# Patient Record
Sex: MALE | Employment: FULL TIME | URBAN - METROPOLITAN AREA
[De-identification: names, ages, dates, MRNs, and addresses within clinical notes are randomized per-mention and may not be internally consistent; named-entity substitution may affect disease eponyms.]

---

## 2017-03-03 ENCOUNTER — OFFICE VISIT (OUTPATIENT)
Dept: FAMILY MEDICINE CLINIC | Age: 52
End: 2017-03-03

## 2017-03-03 VITALS
WEIGHT: 230 LBS | RESPIRATION RATE: 16 BRPM | TEMPERATURE: 98 F | DIASTOLIC BLOOD PRESSURE: 80 MMHG | OXYGEN SATURATION: 98 % | SYSTOLIC BLOOD PRESSURE: 126 MMHG | HEIGHT: 66 IN | BODY MASS INDEX: 36.96 KG/M2 | HEART RATE: 68 BPM

## 2017-03-03 DIAGNOSIS — Z13.220 LIPID SCREENING: ICD-10-CM

## 2017-03-03 DIAGNOSIS — M79.661 PAIN AND SWELLING OF RIGHT LOWER LEG: ICD-10-CM

## 2017-03-03 DIAGNOSIS — Z00.00 ROUTINE GENERAL MEDICAL EXAMINATION AT A HEALTH CARE FACILITY: Primary | ICD-10-CM

## 2017-03-03 DIAGNOSIS — I89.0 LYMPHEDEMA OF RIGHT LOWER EXTREMITY: ICD-10-CM

## 2017-03-03 DIAGNOSIS — Z79.01 WARFARIN ANTICOAGULATION: ICD-10-CM

## 2017-03-03 DIAGNOSIS — M79.89 PAIN AND SWELLING OF RIGHT LOWER LEG: ICD-10-CM

## 2017-03-03 DIAGNOSIS — R60.0 BILATERAL LEG EDEMA: ICD-10-CM

## 2017-03-03 PROCEDURE — 99386 PREV VISIT NEW AGE 40-64: CPT | Performed by: FAMILY MEDICINE

## 2017-03-03 RX ORDER — FUROSEMIDE 40 MG/1
40 TABLET ORAL DAILY
Qty: 30 TABLET | Refills: 3 | Status: SHIPPED | OUTPATIENT
Start: 2017-03-03 | End: 2017-05-03 | Stop reason: ALTCHOICE

## 2017-03-06 ENCOUNTER — HOSPITAL ENCOUNTER (OUTPATIENT)
Dept: WOUND CARE | Age: 52
Discharge: HOME | End: 2017-03-06
Attending: SPECIALIST | Admitting: SPECIALIST

## 2017-03-06 ENCOUNTER — HOSPITAL ENCOUNTER (OUTPATIENT)
Dept: VASCULAR LAB | Age: 52
Discharge: HOME | End: 2017-03-06
Attending: SPECIALIST | Admitting: SPECIALIST

## 2017-03-06 VITALS
TEMPERATURE: 97.9 F | RESPIRATION RATE: 16 BRPM | SYSTOLIC BLOOD PRESSURE: 141 MMHG | HEART RATE: 78 BPM | DIASTOLIC BLOOD PRESSURE: 87 MMHG

## 2017-03-06 DIAGNOSIS — I89.0 LYMPHEDEMA, NOT ELSEWHERE CLASSIFIED: ICD-10-CM

## 2017-03-06 DIAGNOSIS — L97.919 IDIOPATHIC CHRONIC VENOUS HYPERTENSION OF RIGHT LOWER EXTREMITY WITH ULCER (HCC): ICD-10-CM

## 2017-03-06 DIAGNOSIS — M79.89 PAIN AND SWELLING OF RIGHT LOWER LEG: Primary | ICD-10-CM

## 2017-03-06 DIAGNOSIS — I89.0 OTHER LYMPHEDEMA: ICD-10-CM

## 2017-03-06 DIAGNOSIS — R09.89 DIMINISHED PULSES IN LOWER EXTREMITY: Primary | ICD-10-CM

## 2017-03-06 DIAGNOSIS — I87.311 IDIOPATHIC CHRONIC VENOUS HYPERTENSION OF RIGHT LOWER EXTREMITY WITH ULCER (HCC): ICD-10-CM

## 2017-03-06 DIAGNOSIS — M79.661 PAIN AND SWELLING OF RIGHT LOWER LEG: Primary | ICD-10-CM

## 2017-03-06 DIAGNOSIS — I89.0 CHRONIC ACQUIRED LYMPHEDEMA: ICD-10-CM

## 2017-03-06 LAB
A/G RATIO: 1.3 (ref 1.1–2.2)
ALBUMIN SERPL-MCNC: 4 G/DL (ref 3.4–5)
ALP BLD-CCNC: 67 U/L (ref 40–129)
ALT SERPL-CCNC: 9 U/L (ref 10–40)
ANION GAP SERPL CALCULATED.3IONS-SCNC: 13 MMOL/L (ref 3–16)
AST SERPL-CCNC: 11 U/L (ref 15–37)
BILIRUB SERPL-MCNC: 0.4 MG/DL (ref 0–1)
BUN BLDV-MCNC: 19 MG/DL (ref 7–20)
CALCIUM SERPL-MCNC: 8.8 MG/DL (ref 8.3–10.6)
CHLORIDE BLD-SCNC: 106 MMOL/L (ref 99–110)
CO2: 23 MMOL/L (ref 21–32)
CREAT SERPL-MCNC: 0.9 MG/DL (ref 0.9–1.3)
GFR AFRICAN AMERICAN: >60
GFR NON-AFRICAN AMERICAN: >60
GLOBULIN: 3.1 G/DL
GLUCOSE BLD-MCNC: 88 MG/DL (ref 70–99)
HCT VFR BLD CALC: 34.5 % (ref 40.5–52.5)
HEMOGLOBIN: 11 G/DL (ref 13.5–17.5)
INR BLD: 4.69 (ref 0.85–1.15)
MCH RBC QN AUTO: 25.4 PG (ref 26–34)
MCHC RBC AUTO-ENTMCNC: 31.8 G/DL (ref 31–36)
MCV RBC AUTO: 79.7 FL (ref 80–100)
PDW BLD-RTO: 17.6 % (ref 12.4–15.4)
PLATELET # BLD: 210 K/UL (ref 135–450)
PMV BLD AUTO: 7.2 FL (ref 5–10.5)
POTASSIUM SERPL-SCNC: 4.8 MMOL/L (ref 3.5–5.1)
PROTHROMBIN TIME: 53 SEC (ref 9.6–13)
RBC # BLD: 4.33 M/UL (ref 4.2–5.9)
SODIUM BLD-SCNC: 142 MMOL/L (ref 136–145)
TOTAL PROTEIN: 7.1 G/DL (ref 6.4–8.2)
WBC # BLD: 6.1 K/UL (ref 4–11)

## 2017-03-06 PROCEDURE — 11045 DBRDMT SUBQ TISS EACH ADDL: CPT | Performed by: SPECIALIST

## 2017-03-06 PROCEDURE — 11042 DBRDMT SUBQ TIS 1ST 20SQCM/<: CPT | Performed by: SPECIALIST

## 2017-03-06 PROCEDURE — 99204 OFFICE O/P NEW MOD 45 MIN: CPT | Performed by: SPECIALIST

## 2017-03-06 RX ORDER — LIDOCAINE HYDROCHLORIDE 40 MG/ML
SOLUTION TOPICAL ONCE
Status: COMPLETED | OUTPATIENT
Start: 2017-03-06 | End: 2017-03-06

## 2017-03-06 RX ORDER — CIPROFLOXACIN 500 MG/1
500 TABLET, FILM COATED ORAL 2 TIMES DAILY
Qty: 20 TABLET | Refills: 0 | Status: SHIPPED | OUTPATIENT
Start: 2017-03-06 | End: 2017-03-16

## 2017-03-06 RX ORDER — CIPROFLOXACIN 500 MG/1
500 TABLET, FILM COATED ORAL 2 TIMES DAILY
COMMUNITY
End: 2017-03-13 | Stop reason: SDUPTHER

## 2017-03-06 RX ADMIN — LIDOCAINE HYDROCHLORIDE: 40 SOLUTION TOPICAL at 11:17

## 2017-03-06 ASSESSMENT — PAIN DESCRIPTION - PAIN TYPE: TYPE: ACUTE PAIN;CHRONIC PAIN

## 2017-03-06 ASSESSMENT — PAIN DESCRIPTION - ORIENTATION: ORIENTATION: RIGHT

## 2017-03-06 ASSESSMENT — PAIN SCALES - GENERAL: PAINLEVEL_OUTOF10: 4

## 2017-03-06 ASSESSMENT — PAIN DESCRIPTION - LOCATION: LOCATION: LEG

## 2017-03-07 DIAGNOSIS — I89.0 LYMPHEDEMA OF RIGHT LOWER EXTREMITY: ICD-10-CM

## 2017-03-07 DIAGNOSIS — Z13.220 LIPID SCREENING: ICD-10-CM

## 2017-03-07 DIAGNOSIS — Z79.01 WARFARIN ANTICOAGULATION: ICD-10-CM

## 2017-03-07 LAB
A/G RATIO: 1.2 (ref 1.1–2.2)
ALBUMIN SERPL-MCNC: 3.8 G/DL (ref 3.4–5)
ALP BLD-CCNC: 84 U/L (ref 40–129)
ALT SERPL-CCNC: 9 U/L (ref 10–40)
ANION GAP SERPL CALCULATED.3IONS-SCNC: 12 MMOL/L (ref 3–16)
AST SERPL-CCNC: 9 U/L (ref 15–37)
BILIRUB SERPL-MCNC: 0.4 MG/DL (ref 0–1)
BUN BLDV-MCNC: 19 MG/DL (ref 7–20)
CALCIUM SERPL-MCNC: 8.4 MG/DL (ref 8.3–10.6)
CHLORIDE BLD-SCNC: 109 MMOL/L (ref 99–110)
CHOLESTEROL, TOTAL: 180 MG/DL (ref 0–199)
CO2: 24 MMOL/L (ref 21–32)
CREAT SERPL-MCNC: 0.9 MG/DL (ref 0.9–1.3)
GFR AFRICAN AMERICAN: >60
GFR NON-AFRICAN AMERICAN: >60
GLOBULIN: 3.2 G/DL
GLUCOSE BLD-MCNC: 94 MG/DL (ref 70–99)
GRAM STAIN RESULT: ABNORMAL
HDLC SERPL-MCNC: 39 MG/DL (ref 40–60)
INR BLD: 3.87 (ref 0.85–1.15)
LDL CHOLESTEROL CALCULATED: 117 MG/DL
ORGANISM: ABNORMAL
ORGANISM: ABNORMAL
POTASSIUM SERPL-SCNC: 5.1 MMOL/L (ref 3.5–5.1)
PROTHROMBIN TIME: 43.7 SEC (ref 9.6–13)
SODIUM BLD-SCNC: 145 MMOL/L (ref 136–145)
TOTAL PROTEIN: 7 G/DL (ref 6.4–8.2)
TRIGL SERPL-MCNC: 118 MG/DL (ref 0–150)
VLDLC SERPL CALC-MCNC: 24 MG/DL
WOUND/ABSCESS: ABNORMAL
WOUND/ABSCESS: ABNORMAL

## 2017-03-10 ENCOUNTER — TELEPHONE (OUTPATIENT)
Dept: WOUND CARE | Age: 52
End: 2017-03-10

## 2017-03-10 ENCOUNTER — HOSPITAL ENCOUNTER (OUTPATIENT)
Dept: WOUND CARE | Age: 52
Discharge: HOME | End: 2017-03-10
Attending: SPECIALIST | Admitting: SPECIALIST

## 2017-03-13 ENCOUNTER — TELEPHONE (OUTPATIENT)
Dept: FAMILY MEDICINE CLINIC | Age: 52
End: 2017-03-13

## 2017-03-13 ENCOUNTER — HOSPITAL ENCOUNTER (OUTPATIENT)
Dept: WOUND CARE | Age: 52
Discharge: HOME | End: 2017-03-13
Attending: SPECIALIST | Admitting: SPECIALIST

## 2017-03-13 ENCOUNTER — HOSPITAL ENCOUNTER (OUTPATIENT)
Dept: VASCULAR LAB | Age: 52
Discharge: HOME | End: 2017-03-13
Attending: SPECIALIST | Admitting: SPECIALIST

## 2017-03-13 VITALS
DIASTOLIC BLOOD PRESSURE: 79 MMHG | SYSTOLIC BLOOD PRESSURE: 137 MMHG | RESPIRATION RATE: 16 BRPM | TEMPERATURE: 98.1 F | HEART RATE: 71 BPM

## 2017-03-13 DIAGNOSIS — I87.311 IDIOPATHIC CHRONIC VENOUS HYPERTENSION OF RIGHT LOWER EXTREMITY WITH ULCER (HCC): Primary | ICD-10-CM

## 2017-03-13 DIAGNOSIS — L97.919 IDIOPATHIC CHRONIC VENOUS HYPERTENSION OF RIGHT LOWER EXTREMITY WITH ULCER (HCC): ICD-10-CM

## 2017-03-13 DIAGNOSIS — M79.89 PAIN AND SWELLING OF RIGHT LOWER LEG: Primary | ICD-10-CM

## 2017-03-13 DIAGNOSIS — I89.0 CHRONIC ACQUIRED LYMPHEDEMA: ICD-10-CM

## 2017-03-13 DIAGNOSIS — M79.661 PAIN AND SWELLING OF RIGHT LOWER LEG: Primary | ICD-10-CM

## 2017-03-13 DIAGNOSIS — I87.311 IDIOPATHIC CHRONIC VENOUS HYPERTENSION OF RIGHT LOWER EXTREMITY WITH ULCER (HCC): ICD-10-CM

## 2017-03-13 DIAGNOSIS — I82.501 CHRONIC DEEP VEIN THROMBOSIS (DVT) OF RIGHT LOWER EXTREMITY, UNSPECIFIED VEIN (HCC): ICD-10-CM

## 2017-03-13 DIAGNOSIS — L97.919 IDIOPATHIC CHRONIC VENOUS HYPERTENSION OF RIGHT LOWER EXTREMITY WITH ULCER (HCC): Primary | ICD-10-CM

## 2017-03-13 DIAGNOSIS — I89.0 LYMPHEDEMA, NOT ELSEWHERE CLASSIFIED: ICD-10-CM

## 2017-03-13 DIAGNOSIS — I89.0 CHRONIC ACQUIRED LYMPHEDEMA: Primary | ICD-10-CM

## 2017-03-13 PROCEDURE — 11042 DBRDMT SUBQ TIS 1ST 20SQCM/<: CPT | Performed by: SPECIALIST

## 2017-03-13 PROCEDURE — 11045 DBRDMT SUBQ TISS EACH ADDL: CPT | Performed by: SPECIALIST

## 2017-03-13 RX ORDER — LIDOCAINE HYDROCHLORIDE 40 MG/ML
SOLUTION TOPICAL ONCE
Status: DISCONTINUED | OUTPATIENT
Start: 2017-03-13 | End: 2017-03-14 | Stop reason: HOSPADM

## 2017-03-13 ASSESSMENT — PAIN DESCRIPTION - ORIENTATION: ORIENTATION: LEFT

## 2017-03-13 ASSESSMENT — PAIN SCALES - GENERAL: PAINLEVEL_OUTOF10: 4

## 2017-03-13 ASSESSMENT — PAIN DESCRIPTION - LOCATION: LOCATION: LEG

## 2017-03-13 ASSESSMENT — PAIN DESCRIPTION - PAIN TYPE: TYPE: ACUTE PAIN;CHRONIC PAIN

## 2017-03-14 ENCOUNTER — TELEPHONE (OUTPATIENT)
Dept: WOUND CARE | Age: 52
End: 2017-03-14

## 2017-03-16 LAB
INR BLD: 1.26 (ref 0.85–1.15)
PROTHROMBIN TIME: 14.2 SEC (ref 9.6–13)

## 2017-03-17 ENCOUNTER — OFFICE VISIT (OUTPATIENT)
Dept: FAMILY MEDICINE CLINIC | Age: 52
End: 2017-03-17

## 2017-03-17 ENCOUNTER — HOSPITAL ENCOUNTER (OUTPATIENT)
Dept: WOUND CARE | Age: 52
Discharge: HOME | End: 2017-03-17
Attending: SPECIALIST | Admitting: SPECIALIST

## 2017-03-17 ENCOUNTER — TELEPHONE (OUTPATIENT)
Dept: FAMILY MEDICINE CLINIC | Age: 52
End: 2017-03-17

## 2017-03-17 VITALS
RESPIRATION RATE: 12 BRPM | SYSTOLIC BLOOD PRESSURE: 132 MMHG | HEIGHT: 66 IN | DIASTOLIC BLOOD PRESSURE: 83 MMHG | TEMPERATURE: 97.1 F | WEIGHT: 230 LBS | HEART RATE: 72 BPM | BODY MASS INDEX: 36.96 KG/M2

## 2017-03-17 DIAGNOSIS — B95.8 STAPHYLOCOCCAL INFECTION OF SKIN: ICD-10-CM

## 2017-03-17 DIAGNOSIS — I82.5Z3 LOWER LEG DVT (DEEP VENOUS THROMBOEMBOLISM), CHRONIC, BILATERAL (HCC): Primary | ICD-10-CM

## 2017-03-17 DIAGNOSIS — A49.8 PSEUDOMONAS INFECTION: ICD-10-CM

## 2017-03-17 DIAGNOSIS — L08.9 STAPHYLOCOCCAL INFECTION OF SKIN: ICD-10-CM

## 2017-03-17 DIAGNOSIS — I89.0 LYMPHEDEMA, NOT ELSEWHERE CLASSIFIED: ICD-10-CM

## 2017-03-17 PROBLEM — I82.5Z9 LOWER LEG DVT (DEEP VENOUS THROMBOEMBOLISM), CHRONIC (HCC): Status: ACTIVE | Noted: 2017-03-17

## 2017-03-17 PROCEDURE — 99214 OFFICE O/P EST MOD 30 MIN: CPT | Performed by: FAMILY MEDICINE

## 2017-03-17 RX ORDER — CIPROFLOXACIN 500 MG/1
500 TABLET, FILM COATED ORAL 2 TIMES DAILY
Qty: 28 TABLET | Refills: 0 | Status: SHIPPED | OUTPATIENT
Start: 2017-03-17 | End: 2017-03-20 | Stop reason: ALTCHOICE

## 2017-03-17 RX ORDER — SULFAMETHOXAZOLE AND TRIMETHOPRIM 800; 160 MG/1; MG/1
1 TABLET ORAL 2 TIMES DAILY
Qty: 28 TABLET | Refills: 0 | Status: SHIPPED | OUTPATIENT
Start: 2017-03-17 | End: 2017-03-20

## 2017-03-17 RX ORDER — WARFARIN SODIUM 5 MG/1
TABLET ORAL
Qty: 30 TABLET | Refills: 3 | Status: SHIPPED | OUTPATIENT
Start: 2017-03-17 | End: 2017-08-04 | Stop reason: SDUPTHER

## 2017-03-20 ENCOUNTER — HOSPITAL ENCOUNTER (OUTPATIENT)
Dept: WOUND CARE | Age: 52
Discharge: HOME | End: 2017-03-20
Attending: SPECIALIST | Admitting: SPECIALIST

## 2017-03-20 VITALS
RESPIRATION RATE: 12 BRPM | HEART RATE: 76 BPM | SYSTOLIC BLOOD PRESSURE: 142 MMHG | TEMPERATURE: 98.1 F | DIASTOLIC BLOOD PRESSURE: 81 MMHG

## 2017-03-20 DIAGNOSIS — I82.5Z3 LOWER LEG DVT (DEEP VENOUS THROMBOEMBOLISM), CHRONIC, BILATERAL (HCC): Primary | ICD-10-CM

## 2017-03-20 DIAGNOSIS — I89.0 LYMPHEDEMA, NOT ELSEWHERE CLASSIFIED: ICD-10-CM

## 2017-03-20 DIAGNOSIS — I87.311 IDIOPATHIC CHRONIC VENOUS HYPERTENSION OF RIGHT LOWER EXTREMITY WITH ULCER (HCC): ICD-10-CM

## 2017-03-20 DIAGNOSIS — I89.0 CHRONIC ACQUIRED LYMPHEDEMA: ICD-10-CM

## 2017-03-20 DIAGNOSIS — L97.919 IDIOPATHIC CHRONIC VENOUS HYPERTENSION OF RIGHT LOWER EXTREMITY WITH ULCER (HCC): ICD-10-CM

## 2017-03-20 PROCEDURE — 11045 DBRDMT SUBQ TISS EACH ADDL: CPT | Performed by: SPECIALIST

## 2017-03-20 PROCEDURE — 11042 DBRDMT SUBQ TIS 1ST 20SQCM/<: CPT | Performed by: SPECIALIST

## 2017-03-20 RX ORDER — LIDOCAINE HYDROCHLORIDE 40 MG/ML
SOLUTION TOPICAL ONCE
Status: DISCONTINUED | OUTPATIENT
Start: 2017-03-20 | End: 2017-03-21 | Stop reason: HOSPADM

## 2017-03-20 ASSESSMENT — PAIN DESCRIPTION - LOCATION: LOCATION: LEG

## 2017-03-20 ASSESSMENT — PAIN DESCRIPTION - PAIN TYPE: TYPE: ACUTE PAIN;CHRONIC PAIN

## 2017-03-20 ASSESSMENT — PAIN SCALES - GENERAL: PAINLEVEL_OUTOF10: 5

## 2017-03-20 ASSESSMENT — PAIN DESCRIPTION - ORIENTATION: ORIENTATION: RIGHT

## 2017-03-24 ENCOUNTER — HOSPITAL ENCOUNTER (OUTPATIENT)
Dept: PHYSICAL THERAPY | Age: 52
Discharge: HOME | End: 2017-03-31
Admitting: SPECIALIST

## 2017-03-24 ENCOUNTER — HOSPITAL ENCOUNTER (OUTPATIENT)
Dept: WOUND CARE | Age: 52
Discharge: HOME | End: 2017-03-24
Attending: SPECIALIST | Admitting: SPECIALIST

## 2017-03-24 VITALS
RESPIRATION RATE: 14 BRPM | SYSTOLIC BLOOD PRESSURE: 134 MMHG | HEART RATE: 80 BPM | DIASTOLIC BLOOD PRESSURE: 80 MMHG | TEMPERATURE: 98.1 F

## 2017-03-24 ASSESSMENT — PAIN DESCRIPTION - DESCRIPTORS: DESCRIPTORS: ACHING

## 2017-03-24 ASSESSMENT — PAIN SCALES - GENERAL
PAINLEVEL_OUTOF10: 8
PAINLEVEL_OUTOF10: 8

## 2017-03-24 ASSESSMENT — PAIN DESCRIPTION - ORIENTATION
ORIENTATION: RIGHT
ORIENTATION: RIGHT

## 2017-03-24 ASSESSMENT — PAIN DESCRIPTION - LOCATION
LOCATION: LEG
LOCATION: LEG

## 2017-03-24 ASSESSMENT — PAIN DESCRIPTION - PROGRESSION: CLINICAL_PROGRESSION: NOT CHANGED

## 2017-03-24 ASSESSMENT — PAIN DESCRIPTION - PAIN TYPE
TYPE: CHRONIC PAIN
TYPE: CHRONIC PAIN

## 2017-03-24 ASSESSMENT — PAIN DESCRIPTION - FREQUENCY: FREQUENCY: CONTINUOUS

## 2017-03-27 ENCOUNTER — TELEPHONE (OUTPATIENT)
Dept: WOUND CARE | Age: 52
End: 2017-03-27

## 2017-03-27 ENCOUNTER — HOSPITAL ENCOUNTER (OUTPATIENT)
Dept: WOUND CARE | Age: 52
Discharge: HOME | End: 2017-03-27
Attending: SPECIALIST | Admitting: SPECIALIST

## 2017-03-27 ENCOUNTER — OFFICE VISIT (OUTPATIENT)
Dept: FAMILY MEDICINE CLINIC | Age: 52
End: 2017-03-27

## 2017-03-27 VITALS
RESPIRATION RATE: 20 BRPM | OXYGEN SATURATION: 96 % | DIASTOLIC BLOOD PRESSURE: 75 MMHG | SYSTOLIC BLOOD PRESSURE: 121 MMHG | TEMPERATURE: 97.9 F | BODY MASS INDEX: 37.45 KG/M2 | WEIGHT: 232 LBS | HEART RATE: 80 BPM

## 2017-03-27 VITALS
TEMPERATURE: 97.8 F | DIASTOLIC BLOOD PRESSURE: 80 MMHG | SYSTOLIC BLOOD PRESSURE: 159 MMHG | HEART RATE: 80 BPM | RESPIRATION RATE: 16 BRPM

## 2017-03-27 DIAGNOSIS — L97.919 IDIOPATHIC CHRONIC VENOUS HYPERTENSION OF RIGHT LOWER EXTREMITY WITH ULCER (HCC): ICD-10-CM

## 2017-03-27 DIAGNOSIS — I82.5Z3 LOWER LEG DVT (DEEP VENOUS THROMBOEMBOLISM), CHRONIC, BILATERAL (HCC): Primary | ICD-10-CM

## 2017-03-27 DIAGNOSIS — I82.5Z3 LOWER LEG DVT (DEEP VENOUS THROMBOEMBOLISM), CHRONIC, BILATERAL (HCC): ICD-10-CM

## 2017-03-27 DIAGNOSIS — I87.311 IDIOPATHIC CHRONIC VENOUS HYPERTENSION OF RIGHT LOWER EXTREMITY WITH ULCER (HCC): ICD-10-CM

## 2017-03-27 DIAGNOSIS — I89.0 CHRONIC ACQUIRED LYMPHEDEMA: Primary | ICD-10-CM

## 2017-03-27 LAB
INR BLD: 2.24 (ref 0.85–1.15)
PROTHROMBIN TIME: 25.3 SEC (ref 9.6–13)

## 2017-03-27 PROCEDURE — 11045 DBRDMT SUBQ TISS EACH ADDL: CPT | Performed by: SPECIALIST

## 2017-03-27 PROCEDURE — 11042 DBRDMT SUBQ TIS 1ST 20SQCM/<: CPT | Performed by: SPECIALIST

## 2017-03-27 PROCEDURE — 99213 OFFICE O/P EST LOW 20 MIN: CPT | Performed by: FAMILY MEDICINE

## 2017-03-27 RX ORDER — LIDOCAINE HYDROCHLORIDE 40 MG/ML
SOLUTION TOPICAL ONCE
Status: COMPLETED | OUTPATIENT
Start: 2017-03-27 | End: 2017-03-27

## 2017-03-27 RX ADMIN — LIDOCAINE HYDROCHLORIDE: 40 SOLUTION TOPICAL at 09:17

## 2017-03-31 ENCOUNTER — HOSPITAL ENCOUNTER (OUTPATIENT)
Dept: WOUND CARE | Age: 52
Discharge: HOME | End: 2017-03-31
Attending: SPECIALIST | Admitting: SPECIALIST

## 2017-03-31 VITALS
HEART RATE: 68 BPM | RESPIRATION RATE: 18 BRPM | TEMPERATURE: 98.3 F | SYSTOLIC BLOOD PRESSURE: 134 MMHG | DIASTOLIC BLOOD PRESSURE: 74 MMHG

## 2017-03-31 ASSESSMENT — PAIN DESCRIPTION - DESCRIPTORS: DESCRIPTORS: BURNING

## 2017-03-31 ASSESSMENT — PAIN DESCRIPTION - PAIN TYPE: TYPE: CHRONIC PAIN

## 2017-03-31 ASSESSMENT — PAIN DESCRIPTION - LOCATION: LOCATION: LEG

## 2017-03-31 ASSESSMENT — PAIN SCALES - GENERAL: PAINLEVEL_OUTOF10: 8

## 2017-03-31 ASSESSMENT — PAIN DESCRIPTION - ORIENTATION: ORIENTATION: RIGHT

## 2017-04-03 ENCOUNTER — HOSPITAL ENCOUNTER (OUTPATIENT)
Dept: PHYSICAL THERAPY | Age: 52
Discharge: HOME | End: 2017-04-30
Attending: SPECIALIST | Admitting: SPECIALIST

## 2017-04-03 ENCOUNTER — HOSPITAL ENCOUNTER (OUTPATIENT)
Dept: WOUND CARE | Age: 52
Discharge: HOME | End: 2017-04-03
Attending: SPECIALIST | Admitting: SPECIALIST

## 2017-04-03 VITALS
TEMPERATURE: 98.7 F | RESPIRATION RATE: 16 BRPM | DIASTOLIC BLOOD PRESSURE: 78 MMHG | HEART RATE: 76 BPM | SYSTOLIC BLOOD PRESSURE: 119 MMHG

## 2017-04-03 DIAGNOSIS — L97.919 IDIOPATHIC CHRONIC VENOUS HYPERTENSION OF RIGHT LOWER EXTREMITY WITH ULCER (HCC): ICD-10-CM

## 2017-04-03 DIAGNOSIS — I89.0 CHRONIC ACQUIRED LYMPHEDEMA: Primary | ICD-10-CM

## 2017-04-03 DIAGNOSIS — I87.311 IDIOPATHIC CHRONIC VENOUS HYPERTENSION OF RIGHT LOWER EXTREMITY WITH ULCER (HCC): ICD-10-CM

## 2017-04-03 PROCEDURE — 11045 DBRDMT SUBQ TISS EACH ADDL: CPT | Performed by: SPECIALIST

## 2017-04-03 PROCEDURE — 11042 DBRDMT SUBQ TIS 1ST 20SQCM/<: CPT | Performed by: SPECIALIST

## 2017-04-03 RX ORDER — LIDOCAINE HYDROCHLORIDE 40 MG/ML
SOLUTION TOPICAL ONCE
Status: COMPLETED | OUTPATIENT
Start: 2017-04-03 | End: 2017-04-03

## 2017-04-03 RX ADMIN — LIDOCAINE HYDROCHLORIDE: 40 SOLUTION TOPICAL at 09:38

## 2017-04-03 ASSESSMENT — PAIN DESCRIPTION - LOCATION: LOCATION: LEG

## 2017-04-03 ASSESSMENT — PAIN DESCRIPTION - PAIN TYPE: TYPE: CHRONIC PAIN

## 2017-04-03 ASSESSMENT — PAIN DESCRIPTION - DESCRIPTORS: DESCRIPTORS: BURNING

## 2017-04-03 ASSESSMENT — PAIN DESCRIPTION - ORIENTATION: ORIENTATION: RIGHT

## 2017-04-03 ASSESSMENT — PAIN SCALES - GENERAL: PAINLEVEL_OUTOF10: 8

## 2017-04-07 ENCOUNTER — HOSPITAL ENCOUNTER (OUTPATIENT)
Dept: WOUND CARE | Age: 52
Discharge: HOME | End: 2017-04-07
Attending: SPECIALIST | Admitting: SPECIALIST

## 2017-04-07 VITALS
DIASTOLIC BLOOD PRESSURE: 88 MMHG | SYSTOLIC BLOOD PRESSURE: 147 MMHG | HEART RATE: 84 BPM | TEMPERATURE: 98 F | RESPIRATION RATE: 18 BRPM

## 2017-04-07 LAB
GRAM STAIN RESULT: ABNORMAL
ORGANISM: ABNORMAL
ORGANISM: ABNORMAL
WOUND/ABSCESS: ABNORMAL

## 2017-04-10 ENCOUNTER — HOSPITAL ENCOUNTER (OUTPATIENT)
Dept: WOUND CARE | Age: 52
Discharge: HOME | End: 2017-04-10
Attending: SPECIALIST | Admitting: SPECIALIST

## 2017-04-10 ENCOUNTER — HOSPITAL ENCOUNTER (OUTPATIENT)
Dept: PHYSICAL THERAPY | Age: 52
Discharge: HOME | End: 2017-04-10
Admitting: SPECIALIST

## 2017-04-10 VITALS
DIASTOLIC BLOOD PRESSURE: 75 MMHG | SYSTOLIC BLOOD PRESSURE: 129 MMHG | TEMPERATURE: 98.7 F | HEART RATE: 72 BPM | RESPIRATION RATE: 18 BRPM

## 2017-04-10 DIAGNOSIS — I89.0 CHRONIC ACQUIRED LYMPHEDEMA: Primary | ICD-10-CM

## 2017-04-10 DIAGNOSIS — L97.919 IDIOPATHIC CHRONIC VENOUS HYPERTENSION OF RIGHT LOWER EXTREMITY WITH ULCER (HCC): ICD-10-CM

## 2017-04-10 DIAGNOSIS — I87.311 IDIOPATHIC CHRONIC VENOUS HYPERTENSION OF RIGHT LOWER EXTREMITY WITH ULCER (HCC): ICD-10-CM

## 2017-04-10 PROCEDURE — 11045 DBRDMT SUBQ TISS EACH ADDL: CPT | Performed by: SPECIALIST

## 2017-04-10 PROCEDURE — 11042 DBRDMT SUBQ TIS 1ST 20SQCM/<: CPT | Performed by: SPECIALIST

## 2017-04-10 RX ORDER — LIDOCAINE HYDROCHLORIDE 40 MG/ML
SOLUTION TOPICAL ONCE
Status: DISCONTINUED | OUTPATIENT
Start: 2017-04-10 | End: 2017-04-11 | Stop reason: HOSPADM

## 2017-04-13 ENCOUNTER — HOSPITAL ENCOUNTER (OUTPATIENT)
Dept: WOUND CARE | Age: 52
Discharge: HOME | End: 2017-04-13
Attending: SPECIALIST | Admitting: SPECIALIST

## 2017-04-13 VITALS
TEMPERATURE: 98 F | SYSTOLIC BLOOD PRESSURE: 142 MMHG | RESPIRATION RATE: 16 BRPM | HEART RATE: 69 BPM | DIASTOLIC BLOOD PRESSURE: 87 MMHG

## 2017-04-17 ENCOUNTER — HOSPITAL ENCOUNTER (OUTPATIENT)
Dept: WOUND CARE | Age: 52
Discharge: HOME | End: 2017-04-17
Attending: SPECIALIST | Admitting: SPECIALIST

## 2017-04-17 ENCOUNTER — HOSPITAL ENCOUNTER (OUTPATIENT)
Dept: PHYSICAL THERAPY | Age: 52
Discharge: HOME | End: 2017-04-17
Admitting: SPECIALIST

## 2017-04-17 VITALS
SYSTOLIC BLOOD PRESSURE: 147 MMHG | RESPIRATION RATE: 14 BRPM | HEART RATE: 72 BPM | TEMPERATURE: 98.1 F | DIASTOLIC BLOOD PRESSURE: 74 MMHG

## 2017-04-17 DIAGNOSIS — I89.0 CHRONIC ACQUIRED LYMPHEDEMA: Primary | ICD-10-CM

## 2017-04-17 DIAGNOSIS — I87.311 IDIOPATHIC CHRONIC VENOUS HYPERTENSION OF RIGHT LOWER EXTREMITY WITH ULCER (HCC): ICD-10-CM

## 2017-04-17 DIAGNOSIS — L97.919 IDIOPATHIC CHRONIC VENOUS HYPERTENSION OF RIGHT LOWER EXTREMITY WITH ULCER (HCC): ICD-10-CM

## 2017-04-17 PROCEDURE — 11042 DBRDMT SUBQ TIS 1ST 20SQCM/<: CPT | Performed by: SPECIALIST

## 2017-04-17 PROCEDURE — 11045 DBRDMT SUBQ TISS EACH ADDL: CPT | Performed by: SPECIALIST

## 2017-04-17 RX ORDER — LIDOCAINE HYDROCHLORIDE 40 MG/ML
SOLUTION TOPICAL ONCE
Status: COMPLETED | OUTPATIENT
Start: 2017-04-17 | End: 2017-04-17

## 2017-04-17 RX ADMIN — LIDOCAINE HYDROCHLORIDE: 40 SOLUTION TOPICAL at 10:27

## 2017-04-21 ENCOUNTER — HOSPITAL ENCOUNTER (OUTPATIENT)
Dept: WOUND CARE | Age: 52
Discharge: HOME | End: 2017-04-21
Attending: SPECIALIST | Admitting: SPECIALIST

## 2017-04-24 ENCOUNTER — HOSPITAL ENCOUNTER (OUTPATIENT)
Dept: WOUND CARE | Age: 52
Discharge: HOME | End: 2017-04-24
Attending: SPECIALIST | Admitting: SPECIALIST

## 2017-04-24 ENCOUNTER — HOSPITAL ENCOUNTER (OUTPATIENT)
Dept: PHYSICAL THERAPY | Age: 52
Discharge: HOME | End: 2017-04-24
Admitting: SPECIALIST

## 2017-04-24 VITALS
DIASTOLIC BLOOD PRESSURE: 84 MMHG | HEART RATE: 71 BPM | TEMPERATURE: 97.9 F | RESPIRATION RATE: 16 BRPM | SYSTOLIC BLOOD PRESSURE: 130 MMHG

## 2017-04-24 DIAGNOSIS — L97.919 IDIOPATHIC CHRONIC VENOUS HYPERTENSION OF RIGHT LOWER EXTREMITY WITH ULCER (HCC): ICD-10-CM

## 2017-04-24 DIAGNOSIS — I89.0 CHRONIC ACQUIRED LYMPHEDEMA: Primary | ICD-10-CM

## 2017-04-24 DIAGNOSIS — I87.311 IDIOPATHIC CHRONIC VENOUS HYPERTENSION OF RIGHT LOWER EXTREMITY WITH ULCER (HCC): ICD-10-CM

## 2017-04-24 DIAGNOSIS — I82.5Z3 LOWER LEG DVT (DEEP VENOUS THROMBOEMBOLISM), CHRONIC, BILATERAL (HCC): ICD-10-CM

## 2017-04-24 DIAGNOSIS — I82.5Z1 LOWER LEG DVT (DEEP VENOUS THROMBOEMBOLISM), CHRONIC, RIGHT (HCC): ICD-10-CM

## 2017-04-24 LAB
INR BLD: 2.27 (ref 0.85–1.15)
PROTHROMBIN TIME: 25.7 SEC (ref 9.6–13)

## 2017-04-24 PROCEDURE — 11042 DBRDMT SUBQ TIS 1ST 20SQCM/<: CPT | Performed by: SPECIALIST

## 2017-04-24 PROCEDURE — 11045 DBRDMT SUBQ TISS EACH ADDL: CPT | Performed by: SPECIALIST

## 2017-04-24 RX ORDER — SULFAMETHOXAZOLE AND TRIMETHOPRIM 800; 160 MG/1; MG/1
1 TABLET ORAL 2 TIMES DAILY
COMMUNITY
End: 2017-04-28

## 2017-04-24 RX ORDER — OMEGA-3 FATTY ACIDS/FISH OIL 300-1000MG
1 CAPSULE ORAL EVERY 4 HOURS PRN
COMMUNITY
End: 2018-04-16

## 2017-04-24 RX ORDER — SULFAMETHOXAZOLE AND TRIMETHOPRIM 800; 160 MG/1; MG/1
1 TABLET ORAL 2 TIMES DAILY
Qty: 20 TABLET | Refills: 0 | Status: SHIPPED | OUTPATIENT
Start: 2017-04-24 | End: 2017-05-04

## 2017-04-24 RX ORDER — LIDOCAINE HYDROCHLORIDE 40 MG/ML
SOLUTION TOPICAL ONCE
Status: COMPLETED | OUTPATIENT
Start: 2017-04-24 | End: 2017-04-24

## 2017-04-24 RX ADMIN — LIDOCAINE HYDROCHLORIDE: 40 SOLUTION TOPICAL at 08:23

## 2017-04-24 ASSESSMENT — PAIN DESCRIPTION - ORIENTATION: ORIENTATION: RIGHT

## 2017-04-24 ASSESSMENT — PAIN DESCRIPTION - PAIN TYPE: TYPE: ACUTE PAIN

## 2017-04-24 ASSESSMENT — PAIN DESCRIPTION - LOCATION: LOCATION: LEG

## 2017-04-24 ASSESSMENT — PAIN DESCRIPTION - DESCRIPTORS: DESCRIPTORS: ACHING

## 2017-04-24 ASSESSMENT — PAIN SCALES - GENERAL: PAINLEVEL_OUTOF10: 8

## 2017-04-26 ENCOUNTER — ANTI-COAG VISIT (OUTPATIENT)
Dept: FAMILY MEDICINE CLINIC | Age: 52
End: 2017-04-26

## 2017-04-26 DIAGNOSIS — I82.5Z1 LOWER LEG DVT (DEEP VENOUS THROMBOEMBOLISM), CHRONIC, RIGHT (HCC): Primary | ICD-10-CM

## 2017-04-28 ENCOUNTER — HOSPITAL ENCOUNTER (OUTPATIENT)
Dept: WOUND CARE | Age: 52
Discharge: HOME | End: 2017-04-28
Attending: SPECIALIST | Admitting: SPECIALIST

## 2017-04-28 VITALS
RESPIRATION RATE: 18 BRPM | TEMPERATURE: 98 F | HEART RATE: 79 BPM | DIASTOLIC BLOOD PRESSURE: 81 MMHG | SYSTOLIC BLOOD PRESSURE: 132 MMHG

## 2017-04-28 ASSESSMENT — PAIN DESCRIPTION - ORIENTATION: ORIENTATION: RIGHT

## 2017-04-28 ASSESSMENT — PAIN DESCRIPTION - LOCATION: LOCATION: LEG

## 2017-04-28 ASSESSMENT — PAIN SCALES - GENERAL: PAINLEVEL_OUTOF10: 5

## 2017-04-28 ASSESSMENT — PAIN DESCRIPTION - PAIN TYPE: TYPE: ACUTE PAIN

## 2017-04-28 ASSESSMENT — PAIN DESCRIPTION - DESCRIPTORS: DESCRIPTORS: BURNING

## 2017-05-01 ENCOUNTER — HOSPITAL ENCOUNTER (OUTPATIENT)
Dept: WOUND CARE | Age: 52
Discharge: HOME | End: 2017-05-01
Attending: SPECIALIST | Admitting: SPECIALIST

## 2017-05-01 VITALS
SYSTOLIC BLOOD PRESSURE: 141 MMHG | DIASTOLIC BLOOD PRESSURE: 83 MMHG | HEART RATE: 74 BPM | TEMPERATURE: 98.3 F | RESPIRATION RATE: 16 BRPM

## 2017-05-01 DIAGNOSIS — I87.311 IDIOPATHIC CHRONIC VENOUS HYPERTENSION OF RIGHT LOWER EXTREMITY WITH ULCER (HCC): ICD-10-CM

## 2017-05-01 DIAGNOSIS — L97.919 IDIOPATHIC CHRONIC VENOUS HYPERTENSION OF RIGHT LOWER EXTREMITY WITH ULCER (HCC): ICD-10-CM

## 2017-05-01 DIAGNOSIS — I89.0 CHRONIC ACQUIRED LYMPHEDEMA: Primary | ICD-10-CM

## 2017-05-01 PROCEDURE — 11042 DBRDMT SUBQ TIS 1ST 20SQCM/<: CPT | Performed by: SPECIALIST

## 2017-05-01 PROCEDURE — 11045 DBRDMT SUBQ TISS EACH ADDL: CPT | Performed by: SPECIALIST

## 2017-05-01 RX ORDER — LIDOCAINE HYDROCHLORIDE 40 MG/ML
SOLUTION TOPICAL ONCE
Status: COMPLETED | OUTPATIENT
Start: 2017-05-01 | End: 2017-05-01

## 2017-05-01 RX ADMIN — LIDOCAINE HYDROCHLORIDE: 40 SOLUTION TOPICAL at 10:06

## 2017-05-03 ENCOUNTER — OFFICE VISIT (OUTPATIENT)
Dept: FAMILY MEDICINE CLINIC | Age: 52
End: 2017-05-03

## 2017-05-03 VITALS
RESPIRATION RATE: 16 BRPM | WEIGHT: 234.4 LBS | TEMPERATURE: 96.3 F | HEART RATE: 63 BPM | SYSTOLIC BLOOD PRESSURE: 116 MMHG | OXYGEN SATURATION: 97 % | BODY MASS INDEX: 37.67 KG/M2 | DIASTOLIC BLOOD PRESSURE: 78 MMHG

## 2017-05-03 DIAGNOSIS — B07.8 OTHER VIRAL WARTS: Primary | ICD-10-CM

## 2017-05-03 DIAGNOSIS — Z11.59 NEED FOR HEPATITIS C SCREENING TEST: ICD-10-CM

## 2017-05-03 DIAGNOSIS — I89.0 CHRONIC ACQUIRED LYMPHEDEMA: ICD-10-CM

## 2017-05-03 DIAGNOSIS — Z11.4 ENCOUNTER FOR SCREENING FOR HIV: ICD-10-CM

## 2017-05-03 PROCEDURE — 99213 OFFICE O/P EST LOW 20 MIN: CPT | Performed by: FAMILY MEDICINE

## 2017-05-05 ENCOUNTER — HOSPITAL ENCOUNTER (OUTPATIENT)
Dept: WOUND CARE | Age: 52
Discharge: HOME | End: 2017-05-05
Attending: SPECIALIST | Admitting: SPECIALIST

## 2017-05-05 VITALS
TEMPERATURE: 97.8 F | HEART RATE: 74 BPM | RESPIRATION RATE: 18 BRPM | SYSTOLIC BLOOD PRESSURE: 128 MMHG | DIASTOLIC BLOOD PRESSURE: 78 MMHG

## 2017-05-05 RX ORDER — SULFAMETHOXAZOLE AND TRIMETHOPRIM 800; 160 MG/1; MG/1
1 TABLET ORAL 2 TIMES DAILY
Qty: 20 TABLET | Refills: 0 | Status: SHIPPED | OUTPATIENT
Start: 2017-05-05 | End: 2017-05-15

## 2017-05-05 RX ORDER — SULFAMETHOXAZOLE AND TRIMETHOPRIM 800; 160 MG/1; MG/1
1 TABLET ORAL 2 TIMES DAILY
COMMUNITY
End: 2017-05-19

## 2017-05-05 ASSESSMENT — PAIN DESCRIPTION - ORIENTATION: ORIENTATION: RIGHT

## 2017-05-05 ASSESSMENT — PAIN SCALES - GENERAL: PAINLEVEL_OUTOF10: 3

## 2017-05-05 ASSESSMENT — PAIN DESCRIPTION - DESCRIPTORS: DESCRIPTORS: ACHING

## 2017-05-05 ASSESSMENT — PAIN DESCRIPTION - LOCATION: LOCATION: LEG

## 2017-05-05 ASSESSMENT — PAIN DESCRIPTION - PAIN TYPE: TYPE: ACUTE PAIN

## 2017-05-08 ENCOUNTER — HOSPITAL ENCOUNTER (OUTPATIENT)
Dept: WOUND CARE | Age: 52
Discharge: HOME | End: 2017-05-08
Attending: SPECIALIST | Admitting: SPECIALIST

## 2017-05-08 VITALS
RESPIRATION RATE: 18 BRPM | TEMPERATURE: 97.9 F | SYSTOLIC BLOOD PRESSURE: 123 MMHG | HEART RATE: 79 BPM | DIASTOLIC BLOOD PRESSURE: 83 MMHG

## 2017-05-08 DIAGNOSIS — L97.919 IDIOPATHIC CHRONIC VENOUS HYPERTENSION OF RIGHT LOWER EXTREMITY WITH ULCER (HCC): Primary | ICD-10-CM

## 2017-05-08 DIAGNOSIS — I89.0 CHRONIC ACQUIRED LYMPHEDEMA: ICD-10-CM

## 2017-05-08 DIAGNOSIS — I87.311 IDIOPATHIC CHRONIC VENOUS HYPERTENSION OF RIGHT LOWER EXTREMITY WITH ULCER (HCC): Primary | ICD-10-CM

## 2017-05-08 PROCEDURE — 11045 DBRDMT SUBQ TISS EACH ADDL: CPT | Performed by: SPECIALIST

## 2017-05-08 PROCEDURE — 11042 DBRDMT SUBQ TIS 1ST 20SQCM/<: CPT | Performed by: SPECIALIST

## 2017-05-08 RX ORDER — LIDOCAINE HYDROCHLORIDE 40 MG/ML
SOLUTION TOPICAL ONCE
Status: COMPLETED | OUTPATIENT
Start: 2017-05-08 | End: 2017-05-08

## 2017-05-08 RX ADMIN — LIDOCAINE HYDROCHLORIDE: 40 SOLUTION TOPICAL at 10:49

## 2017-05-08 ASSESSMENT — PAIN DESCRIPTION - PAIN TYPE: TYPE: ACUTE PAIN

## 2017-05-08 ASSESSMENT — PAIN DESCRIPTION - LOCATION: LOCATION: LEG

## 2017-05-08 ASSESSMENT — PAIN DESCRIPTION - ORIENTATION: ORIENTATION: RIGHT

## 2017-05-08 ASSESSMENT — PAIN SCALES - GENERAL: PAINLEVEL_OUTOF10: 4

## 2017-05-12 ENCOUNTER — HOSPITAL ENCOUNTER (OUTPATIENT)
Dept: WOUND CARE | Age: 52
Discharge: HOME | End: 2017-05-12
Attending: SPECIALIST | Admitting: SPECIALIST

## 2017-05-12 VITALS
RESPIRATION RATE: 18 BRPM | DIASTOLIC BLOOD PRESSURE: 75 MMHG | TEMPERATURE: 98.1 F | HEART RATE: 76 BPM | SYSTOLIC BLOOD PRESSURE: 129 MMHG

## 2017-05-12 ASSESSMENT — PAIN SCALES - GENERAL: PAINLEVEL_OUTOF10: 3

## 2017-05-12 ASSESSMENT — PAIN DESCRIPTION - ORIENTATION: ORIENTATION: RIGHT

## 2017-05-12 ASSESSMENT — PAIN DESCRIPTION - PAIN TYPE: TYPE: ACUTE PAIN

## 2017-05-12 ASSESSMENT — PAIN DESCRIPTION - LOCATION: LOCATION: LEG

## 2017-05-15 ENCOUNTER — TELEPHONE (OUTPATIENT)
Dept: WOUND CARE | Age: 52
End: 2017-05-15

## 2017-05-15 ENCOUNTER — HOSPITAL ENCOUNTER (OUTPATIENT)
Dept: WOUND CARE | Age: 52
Discharge: HOME | End: 2017-05-15
Attending: SPECIALIST | Admitting: SPECIALIST

## 2017-05-15 VITALS
RESPIRATION RATE: 18 BRPM | TEMPERATURE: 97.8 F | DIASTOLIC BLOOD PRESSURE: 77 MMHG | SYSTOLIC BLOOD PRESSURE: 128 MMHG | HEART RATE: 75 BPM

## 2017-05-15 DIAGNOSIS — L97.919 IDIOPATHIC CHRONIC VENOUS HYPERTENSION OF RIGHT LOWER EXTREMITY WITH ULCER (HCC): ICD-10-CM

## 2017-05-15 DIAGNOSIS — I89.0 CHRONIC ACQUIRED LYMPHEDEMA: Primary | ICD-10-CM

## 2017-05-15 DIAGNOSIS — I82.5Z1 LOWER LEG DVT (DEEP VENOUS THROMBOEMBOLISM), CHRONIC, RIGHT (HCC): ICD-10-CM

## 2017-05-15 DIAGNOSIS — I87.311 IDIOPATHIC CHRONIC VENOUS HYPERTENSION OF RIGHT LOWER EXTREMITY WITH ULCER (HCC): ICD-10-CM

## 2017-05-15 PROCEDURE — 11045 DBRDMT SUBQ TISS EACH ADDL: CPT | Performed by: SPECIALIST

## 2017-05-15 PROCEDURE — 11042 DBRDMT SUBQ TIS 1ST 20SQCM/<: CPT | Performed by: SPECIALIST

## 2017-05-15 RX ORDER — LIDOCAINE HYDROCHLORIDE 40 MG/ML
SOLUTION TOPICAL ONCE
Status: COMPLETED | OUTPATIENT
Start: 2017-05-15 | End: 2017-05-15

## 2017-05-15 RX ADMIN — LIDOCAINE HYDROCHLORIDE: 40 SOLUTION TOPICAL at 10:23

## 2017-05-15 ASSESSMENT — PAIN DESCRIPTION - ORIENTATION: ORIENTATION: RIGHT

## 2017-05-15 ASSESSMENT — PAIN SCALES - GENERAL: PAINLEVEL_OUTOF10: 3

## 2017-05-15 ASSESSMENT — PAIN DESCRIPTION - PAIN TYPE: TYPE: ACUTE PAIN

## 2017-05-19 ENCOUNTER — HOSPITAL ENCOUNTER (OUTPATIENT)
Dept: WOUND CARE | Age: 52
Discharge: HOME | End: 2017-05-19
Attending: SPECIALIST | Admitting: SPECIALIST

## 2017-05-19 VITALS
DIASTOLIC BLOOD PRESSURE: 75 MMHG | SYSTOLIC BLOOD PRESSURE: 116 MMHG | RESPIRATION RATE: 18 BRPM | TEMPERATURE: 98.1 F | HEART RATE: 84 BPM

## 2017-05-19 PROBLEM — Z79.01 CHRONIC ANTICOAGULATION: Status: ACTIVE | Noted: 2017-05-19

## 2017-05-19 ASSESSMENT — PAIN DESCRIPTION - PAIN TYPE: TYPE: ACUTE PAIN

## 2017-05-19 ASSESSMENT — PAIN DESCRIPTION - DESCRIPTORS: DESCRIPTORS: ACHING

## 2017-05-19 ASSESSMENT — PAIN DESCRIPTION - ORIENTATION: ORIENTATION: RIGHT

## 2017-05-19 ASSESSMENT — PAIN DESCRIPTION - LOCATION: LOCATION: LEG

## 2017-05-19 ASSESSMENT — PAIN DESCRIPTION - FREQUENCY: FREQUENCY: CONTINUOUS

## 2017-05-19 ASSESSMENT — PAIN SCALES - GENERAL: PAINLEVEL_OUTOF10: 3

## 2017-05-19 ASSESSMENT — PAIN DESCRIPTION - ONSET: ONSET: ON-GOING

## 2017-05-22 ENCOUNTER — HOSPITAL ENCOUNTER (OUTPATIENT)
Dept: WOUND CARE | Age: 52
Discharge: HOME | End: 2017-05-22
Attending: SPECIALIST | Admitting: SPECIALIST

## 2017-05-22 VITALS
SYSTOLIC BLOOD PRESSURE: 135 MMHG | RESPIRATION RATE: 20 BRPM | DIASTOLIC BLOOD PRESSURE: 90 MMHG | TEMPERATURE: 97.7 F | HEART RATE: 67 BPM

## 2017-05-22 DIAGNOSIS — I89.0 CHRONIC ACQUIRED LYMPHEDEMA: Primary | ICD-10-CM

## 2017-05-22 DIAGNOSIS — I87.311 IDIOPATHIC CHRONIC VENOUS HYPERTENSION OF RIGHT LOWER EXTREMITY WITH ULCER (HCC): ICD-10-CM

## 2017-05-22 DIAGNOSIS — L97.919 IDIOPATHIC CHRONIC VENOUS HYPERTENSION OF RIGHT LOWER EXTREMITY WITH ULCER (HCC): ICD-10-CM

## 2017-05-22 PROCEDURE — 11042 DBRDMT SUBQ TIS 1ST 20SQCM/<: CPT | Performed by: SPECIALIST

## 2017-05-22 PROCEDURE — 11045 DBRDMT SUBQ TISS EACH ADDL: CPT | Performed by: SPECIALIST

## 2017-05-22 RX ORDER — SULFAMETHOXAZOLE AND TRIMETHOPRIM 800; 160 MG/1; MG/1
1 TABLET ORAL 2 TIMES DAILY
COMMUNITY
End: 2017-06-05 | Stop reason: SDUPTHER

## 2017-05-22 RX ORDER — LIDOCAINE HYDROCHLORIDE 40 MG/ML
SOLUTION TOPICAL ONCE
Status: CANCELLED | OUTPATIENT
Start: 2017-05-22 | End: 2017-05-22

## 2017-05-22 RX ORDER — SULFAMETHOXAZOLE AND TRIMETHOPRIM 800; 160 MG/1; MG/1
1 TABLET ORAL 2 TIMES DAILY
Qty: 20 TABLET | Refills: 0 | Status: SHIPPED | OUTPATIENT
Start: 2017-05-22 | End: 2017-06-01

## 2017-05-22 RX ORDER — LIDOCAINE HYDROCHLORIDE 40 MG/ML
SOLUTION TOPICAL ONCE
Status: COMPLETED | OUTPATIENT
Start: 2017-05-22 | End: 2017-05-22

## 2017-05-22 RX ADMIN — LIDOCAINE HYDROCHLORIDE: 40 SOLUTION TOPICAL at 11:34

## 2017-05-22 ASSESSMENT — PAIN DESCRIPTION - ONSET: ONSET: ON-GOING

## 2017-05-22 ASSESSMENT — PAIN DESCRIPTION - DESCRIPTORS: DESCRIPTORS: ACHING

## 2017-05-22 ASSESSMENT — PAIN DESCRIPTION - PAIN TYPE: TYPE: ACUTE PAIN

## 2017-05-22 ASSESSMENT — PAIN DESCRIPTION - FREQUENCY: FREQUENCY: CONTINUOUS

## 2017-05-22 ASSESSMENT — PAIN DESCRIPTION - ORIENTATION: ORIENTATION: RIGHT

## 2017-05-22 ASSESSMENT — PAIN SCALES - GENERAL: PAINLEVEL_OUTOF10: 6

## 2017-05-22 ASSESSMENT — PAIN DESCRIPTION - LOCATION: LOCATION: LEG

## 2017-05-24 ENCOUNTER — HOSPITAL ENCOUNTER (OUTPATIENT)
Dept: WOUND CARE | Age: 52
Discharge: HOME | End: 2017-05-24
Attending: SPECIALIST | Admitting: SPECIALIST

## 2017-05-24 VITALS
RESPIRATION RATE: 16 BRPM | DIASTOLIC BLOOD PRESSURE: 85 MMHG | HEART RATE: 75 BPM | TEMPERATURE: 98 F | SYSTOLIC BLOOD PRESSURE: 120 MMHG

## 2017-05-30 ENCOUNTER — HOSPITAL ENCOUNTER (OUTPATIENT)
Dept: WOUND CARE | Age: 52
Discharge: HOME | End: 2017-05-30
Attending: SPECIALIST | Admitting: SPECIALIST

## 2017-05-30 VITALS
RESPIRATION RATE: 18 BRPM | TEMPERATURE: 97.9 F | DIASTOLIC BLOOD PRESSURE: 80 MMHG | SYSTOLIC BLOOD PRESSURE: 142 MMHG | HEART RATE: 82 BPM

## 2017-05-30 ASSESSMENT — PAIN DESCRIPTION - PAIN TYPE: TYPE: ACUTE PAIN

## 2017-05-30 ASSESSMENT — PAIN SCALES - GENERAL: PAINLEVEL_OUTOF10: 6

## 2017-05-30 ASSESSMENT — PAIN DESCRIPTION - LOCATION: LOCATION: LEG

## 2017-05-30 ASSESSMENT — PAIN DESCRIPTION - ORIENTATION: ORIENTATION: RIGHT

## 2017-05-30 ASSESSMENT — PAIN DESCRIPTION - DESCRIPTORS: DESCRIPTORS: BURNING

## 2017-06-02 ENCOUNTER — HOSPITAL ENCOUNTER (OUTPATIENT)
Dept: WOUND CARE | Age: 52
Discharge: HOME | End: 2017-06-02
Attending: SPECIALIST | Admitting: SPECIALIST

## 2017-06-02 VITALS
TEMPERATURE: 97.7 F | HEART RATE: 71 BPM | SYSTOLIC BLOOD PRESSURE: 114 MMHG | RESPIRATION RATE: 18 BRPM | DIASTOLIC BLOOD PRESSURE: 70 MMHG

## 2017-06-02 DIAGNOSIS — I89.0 CHRONIC ACQUIRED LYMPHEDEMA: ICD-10-CM

## 2017-06-02 DIAGNOSIS — I87.311 IDIOPATHIC CHRONIC VENOUS HYPERTENSION OF RIGHT LOWER EXTREMITY WITH ULCER (HCC): Primary | ICD-10-CM

## 2017-06-02 DIAGNOSIS — L97.919 IDIOPATHIC CHRONIC VENOUS HYPERTENSION OF RIGHT LOWER EXTREMITY WITH ULCER (HCC): Primary | ICD-10-CM

## 2017-06-02 PROCEDURE — 11042 DBRDMT SUBQ TIS 1ST 20SQCM/<: CPT | Performed by: SPECIALIST

## 2017-06-02 PROCEDURE — 11045 DBRDMT SUBQ TISS EACH ADDL: CPT | Performed by: SPECIALIST

## 2017-06-02 RX ORDER — SULFAMETHOXAZOLE AND TRIMETHOPRIM 800; 160 MG/1; MG/1
1 TABLET ORAL 2 TIMES DAILY
Qty: 20 TABLET | Refills: 0 | Status: SHIPPED | OUTPATIENT
Start: 2017-06-02 | End: 2017-06-12

## 2017-06-02 RX ORDER — LIDOCAINE HYDROCHLORIDE 40 MG/ML
SOLUTION TOPICAL ONCE
Status: DISCONTINUED | OUTPATIENT
Start: 2017-06-02 | End: 2017-06-03 | Stop reason: HOSPADM

## 2017-06-02 ASSESSMENT — PAIN DESCRIPTION - ORIENTATION: ORIENTATION: RIGHT

## 2017-06-02 ASSESSMENT — PAIN DESCRIPTION - PAIN TYPE: TYPE: CHRONIC PAIN

## 2017-06-02 ASSESSMENT — PAIN DESCRIPTION - LOCATION: LOCATION: LEG

## 2017-06-02 ASSESSMENT — PAIN SCALES - GENERAL: PAINLEVEL_OUTOF10: 5

## 2017-06-05 ENCOUNTER — HOSPITAL ENCOUNTER (OUTPATIENT)
Dept: WOUND CARE | Age: 52
Discharge: HOME | End: 2017-06-05
Attending: SPECIALIST | Admitting: SPECIALIST

## 2017-06-05 VITALS
SYSTOLIC BLOOD PRESSURE: 139 MMHG | RESPIRATION RATE: 16 BRPM | TEMPERATURE: 97.6 F | DIASTOLIC BLOOD PRESSURE: 80 MMHG | HEART RATE: 59 BPM

## 2017-06-05 LAB
GLUCOSE BLD-MCNC: 173 MG/DL (ref 70–99)
PERFORMED ON: ABNORMAL

## 2017-06-05 ASSESSMENT — PAIN DESCRIPTION - ORIENTATION: ORIENTATION: RIGHT

## 2017-06-05 ASSESSMENT — PAIN DESCRIPTION - PAIN TYPE: TYPE: CHRONIC PAIN

## 2017-06-05 ASSESSMENT — PAIN SCALES - GENERAL: PAINLEVEL_OUTOF10: 5

## 2017-06-05 ASSESSMENT — PAIN DESCRIPTION - LOCATION: LOCATION: LEG

## 2017-06-09 ENCOUNTER — HOSPITAL ENCOUNTER (OUTPATIENT)
Dept: WOUND CARE | Age: 52
Discharge: HOME | End: 2017-06-09
Attending: SPECIALIST | Admitting: SPECIALIST

## 2017-06-09 VITALS
SYSTOLIC BLOOD PRESSURE: 120 MMHG | HEART RATE: 69 BPM | DIASTOLIC BLOOD PRESSURE: 79 MMHG | TEMPERATURE: 98.1 F | RESPIRATION RATE: 16 BRPM

## 2017-06-09 DIAGNOSIS — I89.0 CHRONIC ACQUIRED LYMPHEDEMA: ICD-10-CM

## 2017-06-09 DIAGNOSIS — L97.919 IDIOPATHIC CHRONIC VENOUS HYPERTENSION OF RIGHT LOWER EXTREMITY WITH ULCER (HCC): Primary | ICD-10-CM

## 2017-06-09 DIAGNOSIS — I87.311 IDIOPATHIC CHRONIC VENOUS HYPERTENSION OF RIGHT LOWER EXTREMITY WITH ULCER (HCC): Primary | ICD-10-CM

## 2017-06-09 PROCEDURE — 11045 DBRDMT SUBQ TISS EACH ADDL: CPT | Performed by: SPECIALIST

## 2017-06-09 PROCEDURE — 11042 DBRDMT SUBQ TIS 1ST 20SQCM/<: CPT | Performed by: SPECIALIST

## 2017-06-09 RX ORDER — LIDOCAINE HYDROCHLORIDE 40 MG/ML
SOLUTION TOPICAL ONCE
Status: COMPLETED | OUTPATIENT
Start: 2017-06-09 | End: 2017-06-09

## 2017-06-09 RX ADMIN — LIDOCAINE HYDROCHLORIDE: 40 SOLUTION TOPICAL at 09:00

## 2017-06-12 ENCOUNTER — HOSPITAL ENCOUNTER (OUTPATIENT)
Dept: WOUND CARE | Age: 52
Discharge: HOME | End: 2017-06-12
Attending: SPECIALIST | Admitting: SPECIALIST

## 2017-06-12 VITALS
RESPIRATION RATE: 18 BRPM | SYSTOLIC BLOOD PRESSURE: 125 MMHG | DIASTOLIC BLOOD PRESSURE: 78 MMHG | TEMPERATURE: 98.2 F | HEART RATE: 70 BPM

## 2017-06-12 RX ORDER — SULFAMETHOXAZOLE AND TRIMETHOPRIM 800; 160 MG/1; MG/1
1 TABLET ORAL 2 TIMES DAILY
Qty: 20 TABLET | Refills: 0 | Status: SHIPPED | OUTPATIENT
Start: 2017-06-12 | End: 2017-06-22

## 2017-06-16 ENCOUNTER — HOSPITAL ENCOUNTER (OUTPATIENT)
Dept: WOUND CARE | Age: 52
Discharge: HOME | End: 2017-06-16
Attending: SPECIALIST | Admitting: SPECIALIST

## 2017-06-16 VITALS
TEMPERATURE: 98.2 F | DIASTOLIC BLOOD PRESSURE: 77 MMHG | RESPIRATION RATE: 18 BRPM | HEART RATE: 90 BPM | SYSTOLIC BLOOD PRESSURE: 127 MMHG

## 2017-06-16 DIAGNOSIS — I89.0 CHRONIC ACQUIRED LYMPHEDEMA: Primary | ICD-10-CM

## 2017-06-16 DIAGNOSIS — L97.919 IDIOPATHIC CHRONIC VENOUS HYPERTENSION OF RIGHT LOWER EXTREMITY WITH ULCER (HCC): ICD-10-CM

## 2017-06-16 DIAGNOSIS — I87.311 IDIOPATHIC CHRONIC VENOUS HYPERTENSION OF RIGHT LOWER EXTREMITY WITH ULCER (HCC): ICD-10-CM

## 2017-06-16 PROCEDURE — 11045 DBRDMT SUBQ TISS EACH ADDL: CPT | Performed by: SPECIALIST

## 2017-06-16 PROCEDURE — 11042 DBRDMT SUBQ TIS 1ST 20SQCM/<: CPT | Performed by: SPECIALIST

## 2017-06-16 RX ORDER — LIDOCAINE HYDROCHLORIDE 40 MG/ML
SOLUTION TOPICAL ONCE
Status: COMPLETED | OUTPATIENT
Start: 2017-06-16 | End: 2017-06-16

## 2017-06-16 RX ADMIN — LIDOCAINE HYDROCHLORIDE: 40 SOLUTION TOPICAL at 09:27

## 2017-06-16 ASSESSMENT — PAIN DESCRIPTION - LOCATION: LOCATION: LEG

## 2017-06-16 ASSESSMENT — PAIN DESCRIPTION - FREQUENCY: FREQUENCY: CONTINUOUS

## 2017-06-16 ASSESSMENT — PAIN DESCRIPTION - ONSET: ONSET: ON-GOING

## 2017-06-16 ASSESSMENT — PAIN DESCRIPTION - DESCRIPTORS: DESCRIPTORS: BURNING

## 2017-06-16 ASSESSMENT — PAIN SCALES - GENERAL: PAINLEVEL_OUTOF10: 8

## 2017-06-16 ASSESSMENT — PAIN DESCRIPTION - ORIENTATION: ORIENTATION: RIGHT

## 2017-06-16 ASSESSMENT — PAIN DESCRIPTION - PROGRESSION: CLINICAL_PROGRESSION: NOT CHANGED

## 2017-06-19 ENCOUNTER — HOSPITAL ENCOUNTER (OUTPATIENT)
Dept: WOUND CARE | Age: 52
Discharge: HOME | End: 2017-06-19
Attending: SPECIALIST | Admitting: SPECIALIST

## 2017-06-19 VITALS
TEMPERATURE: 98.4 F | SYSTOLIC BLOOD PRESSURE: 121 MMHG | DIASTOLIC BLOOD PRESSURE: 78 MMHG | HEART RATE: 76 BPM | RESPIRATION RATE: 16 BRPM

## 2017-06-19 ASSESSMENT — PAIN DESCRIPTION - PAIN TYPE: TYPE: CHRONIC PAIN

## 2017-06-19 ASSESSMENT — PAIN DESCRIPTION - ORIENTATION: ORIENTATION: RIGHT

## 2017-06-19 ASSESSMENT — PAIN DESCRIPTION - LOCATION: LOCATION: LEG

## 2017-06-19 ASSESSMENT — PAIN SCALES - GENERAL: PAINLEVEL_OUTOF10: 7

## 2017-06-21 ENCOUNTER — HOSPITAL ENCOUNTER (OUTPATIENT)
Dept: WOUND CARE | Age: 52
Discharge: HOME | End: 2017-06-21
Attending: SPECIALIST | Admitting: SPECIALIST

## 2017-06-21 VITALS
TEMPERATURE: 98.1 F | HEART RATE: 73 BPM | RESPIRATION RATE: 16 BRPM | DIASTOLIC BLOOD PRESSURE: 73 MMHG | SYSTOLIC BLOOD PRESSURE: 129 MMHG

## 2017-06-23 ENCOUNTER — HOSPITAL ENCOUNTER (OUTPATIENT)
Dept: WOUND CARE | Age: 52
Discharge: HOME | End: 2017-06-23
Attending: SPECIALIST | Admitting: SPECIALIST

## 2017-06-23 VITALS
TEMPERATURE: 98.3 F | DIASTOLIC BLOOD PRESSURE: 73 MMHG | HEART RATE: 76 BPM | RESPIRATION RATE: 16 BRPM | SYSTOLIC BLOOD PRESSURE: 125 MMHG

## 2017-06-23 DIAGNOSIS — L97.919 IDIOPATHIC CHRONIC VENOUS HYPERTENSION OF RIGHT LOWER EXTREMITY WITH ULCER (HCC): ICD-10-CM

## 2017-06-23 DIAGNOSIS — I89.0 CHRONIC ACQUIRED LYMPHEDEMA: Primary | ICD-10-CM

## 2017-06-23 DIAGNOSIS — I87.311 IDIOPATHIC CHRONIC VENOUS HYPERTENSION OF RIGHT LOWER EXTREMITY WITH ULCER (HCC): ICD-10-CM

## 2017-06-23 PROCEDURE — 15271 SKIN SUB GRAFT TRNK/ARM/LEG: CPT | Performed by: SPECIALIST

## 2017-06-23 RX ORDER — LIDOCAINE HYDROCHLORIDE 40 MG/ML
SOLUTION TOPICAL ONCE
Status: COMPLETED | OUTPATIENT
Start: 2017-06-23 | End: 2017-06-23

## 2017-06-23 RX ADMIN — LIDOCAINE HYDROCHLORIDE: 40 SOLUTION TOPICAL at 10:23

## 2017-06-26 ENCOUNTER — HOSPITAL ENCOUNTER (OUTPATIENT)
Dept: WOUND CARE | Age: 52
Discharge: HOME | End: 2017-06-26
Attending: SPECIALIST | Admitting: SPECIALIST

## 2017-06-26 VITALS
TEMPERATURE: 98 F | RESPIRATION RATE: 16 BRPM | SYSTOLIC BLOOD PRESSURE: 117 MMHG | DIASTOLIC BLOOD PRESSURE: 76 MMHG | HEART RATE: 67 BPM

## 2017-06-28 ENCOUNTER — HOSPITAL ENCOUNTER (OUTPATIENT)
Dept: WOUND CARE | Age: 52
Discharge: HOME | End: 2017-06-28
Attending: SPECIALIST | Admitting: SPECIALIST

## 2017-06-28 VITALS
RESPIRATION RATE: 16 BRPM | DIASTOLIC BLOOD PRESSURE: 72 MMHG | HEART RATE: 61 BPM | SYSTOLIC BLOOD PRESSURE: 128 MMHG | TEMPERATURE: 98.1 F

## 2017-06-28 ASSESSMENT — PAIN DESCRIPTION - DESCRIPTORS: DESCRIPTORS: BURNING

## 2017-06-28 ASSESSMENT — PAIN DESCRIPTION - ORIENTATION: ORIENTATION: RIGHT

## 2017-06-28 ASSESSMENT — PAIN DESCRIPTION - PAIN TYPE: TYPE: CHRONIC PAIN

## 2017-06-28 ASSESSMENT — PAIN DESCRIPTION - FREQUENCY: FREQUENCY: CONTINUOUS

## 2017-06-28 ASSESSMENT — PAIN DESCRIPTION - LOCATION: LOCATION: LEG

## 2017-06-28 ASSESSMENT — PAIN DESCRIPTION - PROGRESSION: CLINICAL_PROGRESSION: NOT CHANGED

## 2017-06-28 ASSESSMENT — PAIN DESCRIPTION - ONSET: ONSET: ON-GOING

## 2017-06-28 ASSESSMENT — PAIN SCALES - GENERAL: PAINLEVEL_OUTOF10: 4

## 2017-06-30 ENCOUNTER — HOSPITAL ENCOUNTER (OUTPATIENT)
Dept: WOUND CARE | Age: 52
Discharge: HOME | End: 2017-06-30
Attending: SPECIALIST | Admitting: SPECIALIST

## 2017-06-30 VITALS
TEMPERATURE: 98.3 F | SYSTOLIC BLOOD PRESSURE: 128 MMHG | HEART RATE: 73 BPM | DIASTOLIC BLOOD PRESSURE: 72 MMHG | RESPIRATION RATE: 18 BRPM

## 2017-06-30 DIAGNOSIS — I89.0 CHRONIC ACQUIRED LYMPHEDEMA: Primary | ICD-10-CM

## 2017-06-30 DIAGNOSIS — L97.919 IDIOPATHIC CHRONIC VENOUS HYPERTENSION OF RIGHT LOWER EXTREMITY WITH ULCER (HCC): ICD-10-CM

## 2017-06-30 DIAGNOSIS — I87.311 IDIOPATHIC CHRONIC VENOUS HYPERTENSION OF RIGHT LOWER EXTREMITY WITH ULCER (HCC): ICD-10-CM

## 2017-06-30 PROCEDURE — 15271 SKIN SUB GRAFT TRNK/ARM/LEG: CPT | Performed by: SPECIALIST

## 2017-06-30 PROCEDURE — 11042 DBRDMT SUBQ TIS 1ST 20SQCM/<: CPT | Performed by: SPECIALIST

## 2017-06-30 PROCEDURE — 11045 DBRDMT SUBQ TISS EACH ADDL: CPT | Performed by: SPECIALIST

## 2017-06-30 RX ORDER — LIDOCAINE HYDROCHLORIDE 40 MG/ML
SOLUTION TOPICAL ONCE
Status: COMPLETED | OUTPATIENT
Start: 2017-06-30 | End: 2017-06-30

## 2017-06-30 RX ADMIN — LIDOCAINE HYDROCHLORIDE: 40 SOLUTION TOPICAL at 10:20

## 2017-06-30 ASSESSMENT — PAIN SCALES - GENERAL: PAINLEVEL_OUTOF10: 3

## 2017-06-30 ASSESSMENT — PAIN DESCRIPTION - LOCATION: LOCATION: LEG

## 2017-06-30 ASSESSMENT — PAIN DESCRIPTION - PAIN TYPE: TYPE: ACUTE PAIN;CHRONIC PAIN

## 2017-06-30 ASSESSMENT — PAIN DESCRIPTION - ORIENTATION: ORIENTATION: RIGHT;LEFT

## 2017-06-30 ASSESSMENT — PAIN DESCRIPTION - DESCRIPTORS: DESCRIPTORS: BURNING

## 2017-07-03 ENCOUNTER — HOSPITAL ENCOUNTER (OUTPATIENT)
Dept: WOUND CARE | Age: 52
Discharge: HOME | End: 2017-07-03
Attending: SPECIALIST | Admitting: SPECIALIST

## 2017-07-03 VITALS
HEART RATE: 68 BPM | SYSTOLIC BLOOD PRESSURE: 108 MMHG | DIASTOLIC BLOOD PRESSURE: 72 MMHG | RESPIRATION RATE: 16 BRPM | TEMPERATURE: 98.3 F

## 2017-07-03 ASSESSMENT — PAIN DESCRIPTION - DESCRIPTORS: DESCRIPTORS: BURNING

## 2017-07-03 ASSESSMENT — PAIN DESCRIPTION - LOCATION: LOCATION: LEG

## 2017-07-03 ASSESSMENT — PAIN SCALES - GENERAL: PAINLEVEL_OUTOF10: 6

## 2017-07-03 ASSESSMENT — PAIN DESCRIPTION - ONSET: ONSET: ON-GOING

## 2017-07-03 ASSESSMENT — PAIN DESCRIPTION - FREQUENCY: FREQUENCY: CONTINUOUS

## 2017-07-03 ASSESSMENT — PAIN DESCRIPTION - ORIENTATION: ORIENTATION: RIGHT

## 2017-07-03 ASSESSMENT — PAIN DESCRIPTION - PAIN TYPE: TYPE: CHRONIC PAIN

## 2017-07-05 ENCOUNTER — HOSPITAL ENCOUNTER (OUTPATIENT)
Dept: WOUND CARE | Age: 52
Discharge: HOME | End: 2017-07-05
Attending: SPECIALIST | Admitting: SPECIALIST

## 2017-07-05 VITALS
TEMPERATURE: 98 F | SYSTOLIC BLOOD PRESSURE: 110 MMHG | HEART RATE: 72 BPM | RESPIRATION RATE: 16 BRPM | DIASTOLIC BLOOD PRESSURE: 72 MMHG

## 2017-07-07 ENCOUNTER — HOSPITAL ENCOUNTER (OUTPATIENT)
Dept: WOUND CARE | Age: 52
Discharge: HOME | End: 2017-07-07
Attending: SPECIALIST | Admitting: SPECIALIST

## 2017-07-07 VITALS
DIASTOLIC BLOOD PRESSURE: 81 MMHG | HEART RATE: 62 BPM | SYSTOLIC BLOOD PRESSURE: 133 MMHG | RESPIRATION RATE: 14 BRPM | TEMPERATURE: 98.1 F

## 2017-07-07 DIAGNOSIS — I89.0 CHRONIC ACQUIRED LYMPHEDEMA: Primary | ICD-10-CM

## 2017-07-07 DIAGNOSIS — L97.919 IDIOPATHIC CHRONIC VENOUS HYPERTENSION OF RIGHT LOWER EXTREMITY WITH ULCER (HCC): ICD-10-CM

## 2017-07-07 DIAGNOSIS — I87.311 IDIOPATHIC CHRONIC VENOUS HYPERTENSION OF RIGHT LOWER EXTREMITY WITH ULCER (HCC): ICD-10-CM

## 2017-07-07 PROCEDURE — 11042 DBRDMT SUBQ TIS 1ST 20SQCM/<: CPT | Performed by: SPECIALIST

## 2017-07-07 PROCEDURE — 11045 DBRDMT SUBQ TISS EACH ADDL: CPT | Performed by: SPECIALIST

## 2017-07-07 RX ORDER — LIDOCAINE HYDROCHLORIDE 40 MG/ML
SOLUTION TOPICAL ONCE
Status: COMPLETED | OUTPATIENT
Start: 2017-07-07 | End: 2017-07-07

## 2017-07-07 RX ADMIN — LIDOCAINE HYDROCHLORIDE: 40 SOLUTION TOPICAL at 10:16

## 2017-07-07 ASSESSMENT — PAIN SCALES - GENERAL: PAINLEVEL_OUTOF10: 5

## 2017-07-07 ASSESSMENT — PAIN DESCRIPTION - PAIN TYPE: TYPE: CHRONIC PAIN

## 2017-07-07 ASSESSMENT — PAIN DESCRIPTION - ORIENTATION: ORIENTATION: RIGHT

## 2017-07-07 ASSESSMENT — PAIN DESCRIPTION - DESCRIPTORS: DESCRIPTORS: BURNING

## 2017-07-07 ASSESSMENT — PAIN DESCRIPTION - FREQUENCY: FREQUENCY: CONTINUOUS

## 2017-07-07 ASSESSMENT — PAIN DESCRIPTION - ONSET: ONSET: ON-GOING

## 2017-07-07 ASSESSMENT — PAIN DESCRIPTION - LOCATION: LOCATION: LEG

## 2017-07-09 LAB
GRAM STAIN RESULT: ABNORMAL
ORGANISM: ABNORMAL
WOUND/ABSCESS: ABNORMAL
WOUND/ABSCESS: ABNORMAL

## 2017-07-10 ENCOUNTER — HOSPITAL ENCOUNTER (OUTPATIENT)
Dept: WOUND CARE | Age: 52
Discharge: HOME | End: 2017-07-10
Attending: SPECIALIST | Admitting: SPECIALIST

## 2017-07-10 VITALS
HEART RATE: 65 BPM | SYSTOLIC BLOOD PRESSURE: 138 MMHG | DIASTOLIC BLOOD PRESSURE: 74 MMHG | RESPIRATION RATE: 15 BRPM | TEMPERATURE: 98 F

## 2017-07-12 ENCOUNTER — HOSPITAL ENCOUNTER (OUTPATIENT)
Dept: WOUND CARE | Age: 52
Discharge: HOME | End: 2017-07-12
Attending: SPECIALIST | Admitting: SPECIALIST

## 2017-07-12 VITALS
HEART RATE: 69 BPM | RESPIRATION RATE: 16 BRPM | TEMPERATURE: 98 F | SYSTOLIC BLOOD PRESSURE: 148 MMHG | DIASTOLIC BLOOD PRESSURE: 77 MMHG

## 2017-07-12 ASSESSMENT — PAIN DESCRIPTION - FREQUENCY: FREQUENCY: CONTINUOUS

## 2017-07-12 ASSESSMENT — PAIN DESCRIPTION - ORIENTATION: ORIENTATION: RIGHT

## 2017-07-12 ASSESSMENT — PAIN DESCRIPTION - LOCATION: LOCATION: LEG

## 2017-07-12 ASSESSMENT — PAIN SCALES - GENERAL: PAINLEVEL_OUTOF10: 8

## 2017-07-12 ASSESSMENT — PAIN DESCRIPTION - PROGRESSION: CLINICAL_PROGRESSION: NOT CHANGED

## 2017-07-12 ASSESSMENT — PAIN DESCRIPTION - PAIN TYPE: TYPE: ACUTE PAIN

## 2017-07-12 ASSESSMENT — PAIN DESCRIPTION - ONSET: ONSET: ON-GOING

## 2017-07-12 ASSESSMENT — PAIN DESCRIPTION - DESCRIPTORS: DESCRIPTORS: BURNING

## 2017-07-14 ENCOUNTER — HOSPITAL ENCOUNTER (OUTPATIENT)
Dept: WOUND CARE | Age: 52
Discharge: HOME | End: 2017-07-14
Attending: SPECIALIST | Admitting: SPECIALIST

## 2017-07-14 VITALS
DIASTOLIC BLOOD PRESSURE: 84 MMHG | SYSTOLIC BLOOD PRESSURE: 144 MMHG | HEART RATE: 64 BPM | RESPIRATION RATE: 16 BRPM | TEMPERATURE: 97.9 F

## 2017-07-14 DIAGNOSIS — L97.919 IDIOPATHIC CHRONIC VENOUS HYPERTENSION OF RIGHT LOWER EXTREMITY WITH ULCER (HCC): ICD-10-CM

## 2017-07-14 DIAGNOSIS — I87.311 IDIOPATHIC CHRONIC VENOUS HYPERTENSION OF RIGHT LOWER EXTREMITY WITH ULCER (HCC): ICD-10-CM

## 2017-07-14 DIAGNOSIS — I89.0 CHRONIC ACQUIRED LYMPHEDEMA: Primary | ICD-10-CM

## 2017-07-14 PROCEDURE — 11042 DBRDMT SUBQ TIS 1ST 20SQCM/<: CPT | Performed by: SPECIALIST

## 2017-07-14 PROCEDURE — 11045 DBRDMT SUBQ TISS EACH ADDL: CPT | Performed by: SPECIALIST

## 2017-07-14 RX ORDER — LIDOCAINE HYDROCHLORIDE 40 MG/ML
SOLUTION TOPICAL ONCE
Status: DISCONTINUED | OUTPATIENT
Start: 2017-07-14 | End: 2017-07-15 | Stop reason: HOSPADM

## 2017-07-14 RX ORDER — CIPROFLOXACIN 500 MG/1
500 TABLET, FILM COATED ORAL 2 TIMES DAILY
Qty: 20 TABLET | Refills: 0 | Status: SHIPPED | OUTPATIENT
Start: 2017-07-14 | End: 2017-07-24

## 2017-07-14 RX ORDER — CIPROFLOXACIN 500 MG/1
500 TABLET, FILM COATED ORAL 2 TIMES DAILY
COMMUNITY
End: 2017-07-28 | Stop reason: ALTCHOICE

## 2017-07-14 ASSESSMENT — PAIN DESCRIPTION - DESCRIPTORS: DESCRIPTORS: BURNING

## 2017-07-14 ASSESSMENT — PAIN DESCRIPTION - LOCATION: LOCATION: LEG

## 2017-07-14 ASSESSMENT — PAIN SCALES - GENERAL: PAINLEVEL_OUTOF10: 8

## 2017-07-14 ASSESSMENT — PAIN DESCRIPTION - ONSET: ONSET: ON-GOING

## 2017-07-14 ASSESSMENT — PAIN DESCRIPTION - PAIN TYPE: TYPE: ACUTE PAIN

## 2017-07-14 ASSESSMENT — PAIN DESCRIPTION - FREQUENCY: FREQUENCY: CONTINUOUS

## 2017-07-17 ENCOUNTER — HOSPITAL ENCOUNTER (OUTPATIENT)
Dept: WOUND CARE | Age: 52
Discharge: HOME | End: 2017-07-17
Attending: SPECIALIST | Admitting: SPECIALIST

## 2017-07-17 VITALS
HEART RATE: 61 BPM | RESPIRATION RATE: 16 BRPM | SYSTOLIC BLOOD PRESSURE: 139 MMHG | DIASTOLIC BLOOD PRESSURE: 76 MMHG | TEMPERATURE: 98 F

## 2017-07-17 DIAGNOSIS — Z11.59 NEED FOR HEPATITIS C SCREENING TEST: ICD-10-CM

## 2017-07-17 DIAGNOSIS — Z11.4 ENCOUNTER FOR SCREENING FOR HIV: ICD-10-CM

## 2017-07-17 DIAGNOSIS — I82.5Z3 LOWER LEG DVT (DEEP VENOUS THROMBOEMBOLISM), CHRONIC, BILATERAL (HCC): ICD-10-CM

## 2017-07-17 LAB
HEPATITIS C ANTIBODY INTERPRETATION: NORMAL
INR BLD: 2.14 (ref 0.85–1.15)
PROTHROMBIN TIME: 24.2 SEC (ref 9.6–13)

## 2017-07-17 ASSESSMENT — PAIN DESCRIPTION - ORIENTATION: ORIENTATION: RIGHT

## 2017-07-17 ASSESSMENT — PAIN SCALES - GENERAL: PAINLEVEL_OUTOF10: 7

## 2017-07-17 ASSESSMENT — PAIN DESCRIPTION - PAIN TYPE: TYPE: CHRONIC PAIN

## 2017-07-17 ASSESSMENT — PAIN DESCRIPTION - LOCATION: LOCATION: LEG

## 2017-07-18 LAB — HIV-1 AND HIV-2 ANTIBODIES: NORMAL

## 2017-07-19 ENCOUNTER — HOSPITAL ENCOUNTER (OUTPATIENT)
Dept: WOUND CARE | Age: 52
Discharge: HOME | End: 2017-07-19
Attending: SPECIALIST | Admitting: SPECIALIST

## 2017-07-19 ENCOUNTER — ANTI-COAG VISIT (OUTPATIENT)
Dept: FAMILY MEDICINE CLINIC | Age: 52
End: 2017-07-19

## 2017-07-19 VITALS
SYSTOLIC BLOOD PRESSURE: 149 MMHG | HEART RATE: 71 BPM | RESPIRATION RATE: 18 BRPM | DIASTOLIC BLOOD PRESSURE: 76 MMHG | TEMPERATURE: 98.1 F

## 2017-07-19 DIAGNOSIS — I82.5Z1 LOWER LEG DVT (DEEP VENOUS THROMBOEMBOLISM), CHRONIC, RIGHT (HCC): ICD-10-CM

## 2017-07-19 DIAGNOSIS — I89.0 CHRONIC ACQUIRED LYMPHEDEMA: Primary | ICD-10-CM

## 2017-07-19 DIAGNOSIS — L97.919 IDIOPATHIC CHRONIC VENOUS HYPERTENSION OF RIGHT LOWER EXTREMITY WITH ULCER (HCC): Primary | ICD-10-CM

## 2017-07-19 DIAGNOSIS — I87.311 IDIOPATHIC CHRONIC VENOUS HYPERTENSION OF RIGHT LOWER EXTREMITY WITH ULCER (HCC): Primary | ICD-10-CM

## 2017-07-19 DIAGNOSIS — I87.311 IDIOPATHIC CHRONIC VENOUS HYPERTENSION OF RIGHT LOWER EXTREMITY WITH ULCER (HCC): ICD-10-CM

## 2017-07-19 DIAGNOSIS — L97.919 IDIOPATHIC CHRONIC VENOUS HYPERTENSION OF RIGHT LOWER EXTREMITY WITH ULCER (HCC): ICD-10-CM

## 2017-07-19 DIAGNOSIS — Z79.01 CHRONIC ANTICOAGULATION: ICD-10-CM

## 2017-07-19 PROCEDURE — 11042 DBRDMT SUBQ TIS 1ST 20SQCM/<: CPT | Performed by: SPECIALIST

## 2017-07-19 PROCEDURE — 11045 DBRDMT SUBQ TISS EACH ADDL: CPT | Performed by: SPECIALIST

## 2017-07-19 RX ORDER — LIDOCAINE HYDROCHLORIDE 40 MG/ML
SOLUTION TOPICAL ONCE
Status: COMPLETED | OUTPATIENT
Start: 2017-07-19 | End: 2017-07-19

## 2017-07-19 RX ADMIN — LIDOCAINE HYDROCHLORIDE: 40 SOLUTION TOPICAL at 08:37

## 2017-07-19 ASSESSMENT — PAIN SCALES - GENERAL: PAINLEVEL_OUTOF10: 7

## 2017-07-19 ASSESSMENT — PAIN DESCRIPTION - DESCRIPTORS: DESCRIPTORS: BURNING

## 2017-07-19 ASSESSMENT — PAIN DESCRIPTION - ORIENTATION: ORIENTATION: RIGHT

## 2017-07-19 ASSESSMENT — PAIN DESCRIPTION - LOCATION: LOCATION: LEG

## 2017-07-19 ASSESSMENT — PAIN DESCRIPTION - ONSET: ONSET: ON-GOING

## 2017-07-19 ASSESSMENT — PAIN DESCRIPTION - FREQUENCY: FREQUENCY: CONTINUOUS

## 2017-07-19 ASSESSMENT — PAIN DESCRIPTION - PROGRESSION: CLINICAL_PROGRESSION: NOT CHANGED

## 2017-07-19 ASSESSMENT — PAIN DESCRIPTION - PAIN TYPE: TYPE: CHRONIC PAIN

## 2017-07-21 ENCOUNTER — HOSPITAL ENCOUNTER (OUTPATIENT)
Dept: WOUND CARE | Age: 52
Discharge: HOME | End: 2017-07-21
Attending: SPECIALIST | Admitting: SPECIALIST

## 2017-07-21 VITALS
SYSTOLIC BLOOD PRESSURE: 135 MMHG | RESPIRATION RATE: 18 BRPM | HEART RATE: 69 BPM | TEMPERATURE: 97.9 F | DIASTOLIC BLOOD PRESSURE: 79 MMHG

## 2017-07-21 ASSESSMENT — PAIN SCALES - GENERAL: PAINLEVEL_OUTOF10: 7

## 2017-07-21 ASSESSMENT — PAIN DESCRIPTION - LOCATION: LOCATION: LEG

## 2017-07-21 ASSESSMENT — PAIN DESCRIPTION - PROGRESSION: CLINICAL_PROGRESSION: NOT CHANGED

## 2017-07-21 ASSESSMENT — PAIN DESCRIPTION - ORIENTATION: ORIENTATION: RIGHT

## 2017-07-21 ASSESSMENT — PAIN DESCRIPTION - ONSET: ONSET: ON-GOING

## 2017-07-21 ASSESSMENT — PAIN DESCRIPTION - PAIN TYPE: TYPE: CHRONIC PAIN

## 2017-07-21 ASSESSMENT — PAIN DESCRIPTION - FREQUENCY: FREQUENCY: CONTINUOUS

## 2017-07-21 ASSESSMENT — PAIN DESCRIPTION - DESCRIPTORS: DESCRIPTORS: BURNING

## 2017-07-24 ENCOUNTER — HOSPITAL ENCOUNTER (OUTPATIENT)
Dept: WOUND CARE | Age: 52
Discharge: HOME | End: 2017-07-24
Attending: SPECIALIST | Admitting: SPECIALIST

## 2017-07-24 VITALS
SYSTOLIC BLOOD PRESSURE: 133 MMHG | DIASTOLIC BLOOD PRESSURE: 82 MMHG | TEMPERATURE: 98 F | HEART RATE: 71 BPM | RESPIRATION RATE: 16 BRPM

## 2017-07-26 ENCOUNTER — HOSPITAL ENCOUNTER (OUTPATIENT)
Dept: WOUND CARE | Age: 52
Discharge: HOME | End: 2017-07-26
Attending: SPECIALIST | Admitting: SPECIALIST

## 2017-07-28 ENCOUNTER — HOSPITAL ENCOUNTER (OUTPATIENT)
Dept: WOUND CARE | Age: 52
Discharge: HOME | End: 2017-07-28
Attending: SPECIALIST | Admitting: SPECIALIST

## 2017-07-28 VITALS
TEMPERATURE: 98 F | SYSTOLIC BLOOD PRESSURE: 136 MMHG | RESPIRATION RATE: 18 BRPM | DIASTOLIC BLOOD PRESSURE: 84 MMHG | HEART RATE: 69 BPM

## 2017-07-28 DIAGNOSIS — L97.919 IDIOPATHIC CHRONIC VENOUS HYPERTENSION OF RIGHT LOWER EXTREMITY WITH ULCER (HCC): ICD-10-CM

## 2017-07-28 DIAGNOSIS — I87.311 IDIOPATHIC CHRONIC VENOUS HYPERTENSION OF RIGHT LOWER EXTREMITY WITH ULCER (HCC): ICD-10-CM

## 2017-07-28 DIAGNOSIS — I89.0 CHRONIC ACQUIRED LYMPHEDEMA: Primary | ICD-10-CM

## 2017-07-28 PROCEDURE — 15271 SKIN SUB GRAFT TRNK/ARM/LEG: CPT | Performed by: SPECIALIST

## 2017-07-28 RX ORDER — LIDOCAINE HYDROCHLORIDE 40 MG/ML
SOLUTION TOPICAL ONCE
Status: COMPLETED | OUTPATIENT
Start: 2017-07-28 | End: 2017-07-28

## 2017-07-28 RX ADMIN — LIDOCAINE HYDROCHLORIDE: 40 SOLUTION TOPICAL at 10:02

## 2017-08-01 ENCOUNTER — HOSPITAL ENCOUNTER (OUTPATIENT)
Dept: WOUND CARE | Age: 52
Discharge: HOME | End: 2017-08-01
Attending: SURGERY | Admitting: SURGERY

## 2017-08-01 VITALS
RESPIRATION RATE: 18 BRPM | SYSTOLIC BLOOD PRESSURE: 139 MMHG | TEMPERATURE: 98.3 F | DIASTOLIC BLOOD PRESSURE: 77 MMHG | HEART RATE: 75 BPM

## 2017-08-01 RX ORDER — LIDOCAINE HYDROCHLORIDE 40 MG/ML
SOLUTION TOPICAL ONCE
Status: COMPLETED | OUTPATIENT
Start: 2017-08-01 | End: 2017-08-01

## 2017-08-01 RX ADMIN — LIDOCAINE HYDROCHLORIDE: 40 SOLUTION TOPICAL at 09:32

## 2017-08-01 ASSESSMENT — PAIN DESCRIPTION - ORIENTATION: ORIENTATION: RIGHT

## 2017-08-01 ASSESSMENT — PAIN DESCRIPTION - DESCRIPTORS: DESCRIPTORS: BURNING

## 2017-08-01 ASSESSMENT — PAIN DESCRIPTION - LOCATION: LOCATION: LEG

## 2017-08-01 ASSESSMENT — PAIN DESCRIPTION - ONSET: ONSET: ON-GOING

## 2017-08-01 ASSESSMENT — PAIN SCALES - GENERAL: PAINLEVEL_OUTOF10: 0

## 2017-08-01 ASSESSMENT — PAIN DESCRIPTION - FREQUENCY: FREQUENCY: CONTINUOUS

## 2017-08-01 ASSESSMENT — PAIN DESCRIPTION - PROGRESSION: CLINICAL_PROGRESSION: NOT CHANGED

## 2017-08-04 ENCOUNTER — OFFICE VISIT (OUTPATIENT)
Dept: FAMILY MEDICINE CLINIC | Age: 52
End: 2017-08-04

## 2017-08-04 ENCOUNTER — HOSPITAL ENCOUNTER (OUTPATIENT)
Dept: WOUND CARE | Age: 52
Discharge: HOME | End: 2017-08-04
Attending: SPECIALIST | Admitting: SPECIALIST

## 2017-08-04 VITALS
RESPIRATION RATE: 14 BRPM | DIASTOLIC BLOOD PRESSURE: 84 MMHG | OXYGEN SATURATION: 96 % | WEIGHT: 239 LBS | BODY MASS INDEX: 38.41 KG/M2 | HEART RATE: 66 BPM | SYSTOLIC BLOOD PRESSURE: 136 MMHG

## 2017-08-04 VITALS
TEMPERATURE: 98 F | RESPIRATION RATE: 18 BRPM | DIASTOLIC BLOOD PRESSURE: 78 MMHG | HEART RATE: 62 BPM | SYSTOLIC BLOOD PRESSURE: 151 MMHG

## 2017-08-04 DIAGNOSIS — J30.2 SEASONAL ALLERGIC RHINITIS, UNSPECIFIED ALLERGIC RHINITIS TRIGGER: ICD-10-CM

## 2017-08-04 DIAGNOSIS — I82.5Z3 LOWER LEG DVT (DEEP VENOUS THROMBOEMBOLISM), CHRONIC, BILATERAL (HCC): ICD-10-CM

## 2017-08-04 DIAGNOSIS — Z23 NEED FOR TDAP VACCINATION: ICD-10-CM

## 2017-08-04 DIAGNOSIS — R21 MACULOPAPULAR RASH: Primary | ICD-10-CM

## 2017-08-04 DIAGNOSIS — T50.905A DRUG REACTION, INITIAL ENCOUNTER: ICD-10-CM

## 2017-08-04 PROCEDURE — 99214 OFFICE O/P EST MOD 30 MIN: CPT | Performed by: FAMILY MEDICINE

## 2017-08-04 RX ORDER — WARFARIN SODIUM 5 MG/1
TABLET ORAL
Qty: 30 TABLET | Refills: 3 | Status: SHIPPED | OUTPATIENT
Start: 2017-08-04 | End: 2017-11-13 | Stop reason: SDUPTHER

## 2017-08-04 RX ORDER — ACETAMINOPHEN 500 MG
1000 TABLET ORAL 3 TIMES DAILY PRN
Qty: 60 TABLET | Refills: 1 | Status: SHIPPED | OUTPATIENT
Start: 2017-08-04 | End: 2017-08-14 | Stop reason: ALTCHOICE

## 2017-08-04 ASSESSMENT — PAIN DESCRIPTION - DESCRIPTORS: DESCRIPTORS: BURNING

## 2017-08-04 ASSESSMENT — PAIN DESCRIPTION - PAIN TYPE: TYPE: ACUTE PAIN

## 2017-08-04 ASSESSMENT — PAIN DESCRIPTION - ORIENTATION: ORIENTATION: RIGHT

## 2017-08-04 ASSESSMENT — PAIN DESCRIPTION - LOCATION: LOCATION: LEG

## 2017-08-04 ASSESSMENT — PAIN SCALES - GENERAL: PAINLEVEL_OUTOF10: 4

## 2017-08-07 ENCOUNTER — HOSPITAL ENCOUNTER (OUTPATIENT)
Dept: WOUND CARE | Age: 52
Discharge: HOME | End: 2017-08-07
Attending: SPECIALIST | Admitting: SPECIALIST

## 2017-08-07 VITALS
SYSTOLIC BLOOD PRESSURE: 127 MMHG | RESPIRATION RATE: 18 BRPM | HEART RATE: 69 BPM | TEMPERATURE: 98.2 F | DIASTOLIC BLOOD PRESSURE: 84 MMHG

## 2017-08-07 DIAGNOSIS — I87.311 IDIOPATHIC CHRONIC VENOUS HYPERTENSION OF RIGHT LOWER EXTREMITY WITH ULCER (HCC): ICD-10-CM

## 2017-08-07 DIAGNOSIS — I89.0 CHRONIC ACQUIRED LYMPHEDEMA: Primary | ICD-10-CM

## 2017-08-07 DIAGNOSIS — L97.919 IDIOPATHIC CHRONIC VENOUS HYPERTENSION OF RIGHT LOWER EXTREMITY WITH ULCER (HCC): ICD-10-CM

## 2017-08-07 PROCEDURE — 11045 DBRDMT SUBQ TISS EACH ADDL: CPT | Performed by: SPECIALIST

## 2017-08-07 PROCEDURE — 11042 DBRDMT SUBQ TIS 1ST 20SQCM/<: CPT | Performed by: SPECIALIST

## 2017-08-07 RX ORDER — LIDOCAINE HYDROCHLORIDE 40 MG/ML
SOLUTION TOPICAL ONCE
Status: COMPLETED | OUTPATIENT
Start: 2017-08-07 | End: 2017-08-07

## 2017-08-07 RX ADMIN — LIDOCAINE HYDROCHLORIDE: 40 SOLUTION TOPICAL at 10:09

## 2017-08-07 ASSESSMENT — PAIN DESCRIPTION - PAIN TYPE: TYPE: ACUTE PAIN

## 2017-08-07 ASSESSMENT — PAIN DESCRIPTION - ORIENTATION: ORIENTATION: RIGHT

## 2017-08-07 ASSESSMENT — PAIN SCALES - GENERAL: PAINLEVEL_OUTOF10: 4

## 2017-08-07 ASSESSMENT — PAIN DESCRIPTION - LOCATION: LOCATION: LEG

## 2017-08-07 ASSESSMENT — PAIN DESCRIPTION - DESCRIPTORS: DESCRIPTORS: BURNING

## 2017-08-07 ASSESSMENT — PAIN DESCRIPTION - FREQUENCY: FREQUENCY: CONTINUOUS

## 2017-08-11 ENCOUNTER — HOSPITAL ENCOUNTER (OUTPATIENT)
Dept: WOUND CARE | Age: 52
Discharge: HOME | End: 2017-08-11
Attending: SPECIALIST | Admitting: SPECIALIST

## 2017-08-11 VITALS
SYSTOLIC BLOOD PRESSURE: 144 MMHG | DIASTOLIC BLOOD PRESSURE: 84 MMHG | TEMPERATURE: 98.5 F | HEART RATE: 80 BPM | RESPIRATION RATE: 16 BRPM

## 2017-08-11 ASSESSMENT — PAIN DESCRIPTION - LOCATION: LOCATION: LEG

## 2017-08-11 ASSESSMENT — PAIN SCALES - GENERAL: PAINLEVEL_OUTOF10: 4

## 2017-08-11 ASSESSMENT — PAIN DESCRIPTION - ORIENTATION: ORIENTATION: RIGHT

## 2017-08-11 ASSESSMENT — PAIN DESCRIPTION - PAIN TYPE: TYPE: ACUTE PAIN

## 2017-08-11 ASSESSMENT — PAIN DESCRIPTION - DESCRIPTORS: DESCRIPTORS: BURNING

## 2017-08-11 ASSESSMENT — PAIN DESCRIPTION - FREQUENCY: FREQUENCY: CONTINUOUS

## 2017-08-14 ENCOUNTER — HOSPITAL ENCOUNTER (OUTPATIENT)
Dept: WOUND CARE | Age: 52
Discharge: HOME | End: 2017-08-14
Attending: SPECIALIST | Admitting: SPECIALIST

## 2017-08-14 VITALS
SYSTOLIC BLOOD PRESSURE: 145 MMHG | DIASTOLIC BLOOD PRESSURE: 85 MMHG | TEMPERATURE: 98 F | HEART RATE: 87 BPM | RESPIRATION RATE: 16 BRPM

## 2017-08-14 DIAGNOSIS — I87.311 IDIOPATHIC CHRONIC VENOUS HYPERTENSION OF RIGHT LOWER EXTREMITY WITH ULCER (HCC): ICD-10-CM

## 2017-08-14 DIAGNOSIS — L97.919 IDIOPATHIC CHRONIC VENOUS HYPERTENSION OF RIGHT LOWER EXTREMITY WITH ULCER (HCC): ICD-10-CM

## 2017-08-14 DIAGNOSIS — I89.0 CHRONIC ACQUIRED LYMPHEDEMA: Primary | ICD-10-CM

## 2017-08-14 PROCEDURE — 11042 DBRDMT SUBQ TIS 1ST 20SQCM/<: CPT | Performed by: SPECIALIST

## 2017-08-14 PROCEDURE — 11045 DBRDMT SUBQ TISS EACH ADDL: CPT | Performed by: SPECIALIST

## 2017-08-14 RX ORDER — LIDOCAINE HYDROCHLORIDE 40 MG/ML
SOLUTION TOPICAL ONCE
Status: DISCONTINUED | OUTPATIENT
Start: 2017-08-14 | End: 2017-08-15 | Stop reason: HOSPADM

## 2017-08-16 ENCOUNTER — HOSPITAL ENCOUNTER (OUTPATIENT)
Dept: WOUND CARE | Age: 52
Discharge: HOME | End: 2017-08-16
Attending: SPECIALIST | Admitting: SPECIALIST

## 2017-08-16 VITALS
RESPIRATION RATE: 18 BRPM | HEART RATE: 73 BPM | SYSTOLIC BLOOD PRESSURE: 137 MMHG | DIASTOLIC BLOOD PRESSURE: 74 MMHG | TEMPERATURE: 98.1 F

## 2017-08-16 ASSESSMENT — PAIN SCALES - GENERAL: PAINLEVEL_OUTOF10: 4

## 2017-08-16 ASSESSMENT — PAIN DESCRIPTION - ORIENTATION: ORIENTATION: RIGHT

## 2017-08-16 ASSESSMENT — PAIN DESCRIPTION - DESCRIPTORS: DESCRIPTORS: ACHING

## 2017-08-16 ASSESSMENT — PAIN DESCRIPTION - PAIN TYPE: TYPE: ACUTE PAIN

## 2017-08-18 ENCOUNTER — HOSPITAL ENCOUNTER (OUTPATIENT)
Dept: WOUND CARE | Age: 52
Discharge: HOME | End: 2017-08-18
Attending: SPECIALIST | Admitting: SPECIALIST

## 2017-08-18 VITALS
RESPIRATION RATE: 18 BRPM | DIASTOLIC BLOOD PRESSURE: 78 MMHG | HEART RATE: 77 BPM | TEMPERATURE: 98.4 F | SYSTOLIC BLOOD PRESSURE: 134 MMHG

## 2017-08-18 ASSESSMENT — PAIN DESCRIPTION - ORIENTATION: ORIENTATION: RIGHT

## 2017-08-18 ASSESSMENT — PAIN DESCRIPTION - PAIN TYPE: TYPE: ACUTE PAIN;CHRONIC PAIN

## 2017-08-18 ASSESSMENT — PAIN DESCRIPTION - LOCATION: LOCATION: LEG

## 2017-08-18 ASSESSMENT — PAIN DESCRIPTION - DESCRIPTORS: DESCRIPTORS: ACHING

## 2017-08-18 ASSESSMENT — PAIN SCALES - GENERAL: PAINLEVEL_OUTOF10: 5

## 2017-08-21 ENCOUNTER — HOSPITAL ENCOUNTER (OUTPATIENT)
Dept: WOUND CARE | Age: 52
Discharge: HOME | End: 2017-08-21
Attending: SPECIALIST | Admitting: SPECIALIST

## 2017-08-21 VITALS
HEART RATE: 72 BPM | TEMPERATURE: 97.8 F | SYSTOLIC BLOOD PRESSURE: 144 MMHG | RESPIRATION RATE: 18 BRPM | DIASTOLIC BLOOD PRESSURE: 79 MMHG

## 2017-08-21 DIAGNOSIS — I89.0 CHRONIC ACQUIRED LYMPHEDEMA: ICD-10-CM

## 2017-08-21 DIAGNOSIS — L97.919 IDIOPATHIC CHRONIC VENOUS HYPERTENSION OF RIGHT LOWER EXTREMITY WITH ULCER (HCC): ICD-10-CM

## 2017-08-21 DIAGNOSIS — I87.311 IDIOPATHIC CHRONIC VENOUS HYPERTENSION OF RIGHT LOWER EXTREMITY WITH ULCER (HCC): ICD-10-CM

## 2017-08-21 DIAGNOSIS — I74.5 THROMBOSIS OF ILIAC ARTERY (HCC): Primary | ICD-10-CM

## 2017-08-21 LAB
ANION GAP SERPL CALCULATED.3IONS-SCNC: 14 MMOL/L (ref 3–16)
BUN BLDV-MCNC: 17 MG/DL (ref 7–20)
CALCIUM SERPL-MCNC: 8.3 MG/DL (ref 8.3–10.6)
CHLORIDE BLD-SCNC: 107 MMOL/L (ref 99–110)
CO2: 17 MMOL/L (ref 21–32)
CREAT SERPL-MCNC: 0.9 MG/DL (ref 0.9–1.3)
GFR AFRICAN AMERICAN: >60
GFR NON-AFRICAN AMERICAN: >60
GLUCOSE BLD-MCNC: 109 MG/DL (ref 70–99)
POTASSIUM SERPL-SCNC: 4.4 MMOL/L (ref 3.5–5.1)
SODIUM BLD-SCNC: 138 MMOL/L (ref 136–145)

## 2017-08-21 PROCEDURE — 11045 DBRDMT SUBQ TISS EACH ADDL: CPT | Performed by: SPECIALIST

## 2017-08-21 PROCEDURE — 11042 DBRDMT SUBQ TIS 1ST 20SQCM/<: CPT | Performed by: SPECIALIST

## 2017-08-21 RX ORDER — LIDOCAINE HYDROCHLORIDE 40 MG/ML
SOLUTION TOPICAL ONCE
Status: COMPLETED | OUTPATIENT
Start: 2017-08-21 | End: 2017-08-21

## 2017-08-21 RX ADMIN — LIDOCAINE HYDROCHLORIDE: 40 SOLUTION TOPICAL at 10:50

## 2017-08-21 ASSESSMENT — PAIN DESCRIPTION - ORIENTATION: ORIENTATION: RIGHT

## 2017-08-21 ASSESSMENT — PAIN SCALES - GENERAL: PAINLEVEL_OUTOF10: 5

## 2017-08-21 ASSESSMENT — PAIN DESCRIPTION - PAIN TYPE: TYPE: ACUTE PAIN;CHRONIC PAIN

## 2017-08-21 ASSESSMENT — PAIN DESCRIPTION - DESCRIPTORS: DESCRIPTORS: ACHING

## 2017-08-21 ASSESSMENT — PAIN DESCRIPTION - LOCATION: LOCATION: LEG

## 2017-08-23 ENCOUNTER — HOSPITAL ENCOUNTER (OUTPATIENT)
Dept: WOUND CARE | Age: 52
Discharge: HOME | End: 2017-08-23
Attending: SPECIALIST | Admitting: SPECIALIST

## 2017-08-25 ENCOUNTER — HOSPITAL ENCOUNTER (OUTPATIENT)
Dept: WOUND CARE | Age: 52
Discharge: HOME | End: 2017-08-25
Attending: PODIATRIST | Admitting: PODIATRIST

## 2017-08-25 VITALS
TEMPERATURE: 98.4 F | HEART RATE: 75 BPM | DIASTOLIC BLOOD PRESSURE: 70 MMHG | SYSTOLIC BLOOD PRESSURE: 141 MMHG | RESPIRATION RATE: 16 BRPM

## 2017-08-25 ASSESSMENT — PAIN DESCRIPTION - ORIENTATION: ORIENTATION: RIGHT

## 2017-08-25 ASSESSMENT — PAIN DESCRIPTION - DESCRIPTORS: DESCRIPTORS: ACHING

## 2017-08-25 ASSESSMENT — PAIN DESCRIPTION - PAIN TYPE: TYPE: ACUTE PAIN;CHRONIC PAIN

## 2017-08-25 ASSESSMENT — PAIN SCALES - GENERAL: PAINLEVEL_OUTOF10: 6

## 2017-08-25 ASSESSMENT — PAIN DESCRIPTION - PROGRESSION: CLINICAL_PROGRESSION: NOT CHANGED

## 2017-08-25 ASSESSMENT — PAIN DESCRIPTION - FREQUENCY: FREQUENCY: CONTINUOUS

## 2017-08-25 ASSESSMENT — PAIN DESCRIPTION - LOCATION: LOCATION: LEG

## 2017-08-28 ENCOUNTER — HOSPITAL ENCOUNTER (OUTPATIENT)
Dept: WOUND CARE | Age: 52
Discharge: HOME | End: 2017-08-28
Attending: SPECIALIST | Admitting: SPECIALIST

## 2017-08-28 VITALS
DIASTOLIC BLOOD PRESSURE: 82 MMHG | SYSTOLIC BLOOD PRESSURE: 135 MMHG | TEMPERATURE: 97.7 F | HEART RATE: 69 BPM | RESPIRATION RATE: 18 BRPM

## 2017-08-28 DIAGNOSIS — I89.0 CHRONIC ACQUIRED LYMPHEDEMA: Primary | ICD-10-CM

## 2017-08-28 DIAGNOSIS — I82.5Z1 LOWER LEG DVT (DEEP VENOUS THROMBOEMBOLISM), CHRONIC, RIGHT (HCC): ICD-10-CM

## 2017-08-28 DIAGNOSIS — I87.311 IDIOPATHIC CHRONIC VENOUS HYPERTENSION OF RIGHT LOWER EXTREMITY WITH ULCER (HCC): ICD-10-CM

## 2017-08-28 DIAGNOSIS — L97.919 IDIOPATHIC CHRONIC VENOUS HYPERTENSION OF RIGHT LOWER EXTREMITY WITH ULCER (HCC): ICD-10-CM

## 2017-08-28 DIAGNOSIS — Z79.01 CHRONIC ANTICOAGULATION: ICD-10-CM

## 2017-08-28 PROCEDURE — 11045 DBRDMT SUBQ TISS EACH ADDL: CPT | Performed by: SPECIALIST

## 2017-08-28 PROCEDURE — 11042 DBRDMT SUBQ TIS 1ST 20SQCM/<: CPT | Performed by: SPECIALIST

## 2017-08-28 RX ORDER — LIDOCAINE HYDROCHLORIDE 40 MG/ML
SOLUTION TOPICAL ONCE
Status: COMPLETED | OUTPATIENT
Start: 2017-08-28 | End: 2017-08-28

## 2017-08-28 RX ADMIN — LIDOCAINE HYDROCHLORIDE: 40 SOLUTION TOPICAL at 10:45

## 2017-08-28 ASSESSMENT — PAIN DESCRIPTION - LOCATION: LOCATION: LEG

## 2017-08-28 ASSESSMENT — PAIN SCALES - GENERAL: PAINLEVEL_OUTOF10: 6

## 2017-08-28 ASSESSMENT — PAIN DESCRIPTION - DESCRIPTORS: DESCRIPTORS: ACHING;BURNING

## 2017-08-28 ASSESSMENT — PAIN DESCRIPTION - PAIN TYPE: TYPE: ACUTE PAIN;CHRONIC PAIN

## 2017-08-28 ASSESSMENT — PAIN DESCRIPTION - ORIENTATION: ORIENTATION: LEFT

## 2017-08-30 ENCOUNTER — HOSPITAL ENCOUNTER (OUTPATIENT)
Dept: WOUND CARE | Age: 52
Discharge: HOME | End: 2017-08-30
Attending: SPECIALIST | Admitting: SPECIALIST

## 2017-08-30 VITALS
RESPIRATION RATE: 16 BRPM | DIASTOLIC BLOOD PRESSURE: 100 MMHG | SYSTOLIC BLOOD PRESSURE: 157 MMHG | HEART RATE: 73 BPM | TEMPERATURE: 98.4 F

## 2017-09-01 ENCOUNTER — HOSPITAL ENCOUNTER (OUTPATIENT)
Dept: WOUND CARE | Age: 52
Discharge: HOME | End: 2017-09-01
Attending: SPECIALIST | Admitting: SPECIALIST

## 2017-09-01 VITALS
HEART RATE: 68 BPM | RESPIRATION RATE: 14 BRPM | TEMPERATURE: 97.8 F | DIASTOLIC BLOOD PRESSURE: 84 MMHG | SYSTOLIC BLOOD PRESSURE: 134 MMHG

## 2017-09-05 ENCOUNTER — HOSPITAL ENCOUNTER (OUTPATIENT)
Dept: WOUND CARE | Age: 52
Discharge: HOME | End: 2017-09-05
Attending: SPECIALIST | Admitting: SPECIALIST

## 2017-09-05 VITALS
DIASTOLIC BLOOD PRESSURE: 87 MMHG | TEMPERATURE: 98.5 F | SYSTOLIC BLOOD PRESSURE: 142 MMHG | RESPIRATION RATE: 16 BRPM | HEART RATE: 84 BPM

## 2017-09-05 ASSESSMENT — PAIN DESCRIPTION - DESCRIPTORS: DESCRIPTORS: ACHING;BURNING

## 2017-09-05 ASSESSMENT — PAIN DESCRIPTION - LOCATION: LOCATION: LEG

## 2017-09-05 ASSESSMENT — PAIN DESCRIPTION - FREQUENCY: FREQUENCY: CONTINUOUS

## 2017-09-05 ASSESSMENT — PAIN DESCRIPTION - PAIN TYPE: TYPE: ACUTE PAIN

## 2017-09-05 ASSESSMENT — PAIN SCALES - GENERAL: PAINLEVEL_OUTOF10: 8

## 2017-09-05 ASSESSMENT — PAIN DESCRIPTION - ORIENTATION: ORIENTATION: RIGHT

## 2017-09-05 ASSESSMENT — PAIN DESCRIPTION - PROGRESSION: CLINICAL_PROGRESSION: NOT CHANGED

## 2017-09-05 ASSESSMENT — PAIN DESCRIPTION - ONSET: ONSET: ON-GOING

## 2017-09-06 ENCOUNTER — HOSPITAL ENCOUNTER (OUTPATIENT)
Dept: WOUND CARE | Age: 52
Discharge: HOME | End: 2017-09-06
Attending: SPECIALIST | Admitting: SPECIALIST

## 2017-09-06 VITALS
RESPIRATION RATE: 16 BRPM | DIASTOLIC BLOOD PRESSURE: 82 MMHG | HEART RATE: 76 BPM | TEMPERATURE: 98.2 F | SYSTOLIC BLOOD PRESSURE: 140 MMHG

## 2017-09-06 DIAGNOSIS — L97.919 IDIOPATHIC CHRONIC VENOUS HYPERTENSION OF RIGHT LOWER EXTREMITY WITH ULCER (HCC): Primary | ICD-10-CM

## 2017-09-06 DIAGNOSIS — I87.311 IDIOPATHIC CHRONIC VENOUS HYPERTENSION OF RIGHT LOWER EXTREMITY WITH ULCER (HCC): Primary | ICD-10-CM

## 2017-09-06 PROCEDURE — 11042 DBRDMT SUBQ TIS 1ST 20SQCM/<: CPT | Performed by: SPECIALIST

## 2017-09-06 PROCEDURE — 11045 DBRDMT SUBQ TISS EACH ADDL: CPT | Performed by: SPECIALIST

## 2017-09-06 RX ORDER — LIDOCAINE HYDROCHLORIDE 40 MG/ML
2.5 SOLUTION TOPICAL ONCE
Status: COMPLETED | OUTPATIENT
Start: 2017-09-06 | End: 2017-09-06

## 2017-09-06 RX ADMIN — LIDOCAINE HYDROCHLORIDE 2.5 ML: 40 SOLUTION TOPICAL at 09:15

## 2017-09-06 ASSESSMENT — PAIN DESCRIPTION - PAIN TYPE: TYPE: ACUTE PAIN

## 2017-09-06 ASSESSMENT — PAIN DESCRIPTION - PROGRESSION: CLINICAL_PROGRESSION: NOT CHANGED

## 2017-09-06 ASSESSMENT — PAIN DESCRIPTION - LOCATION: LOCATION: LEG

## 2017-09-06 ASSESSMENT — PAIN DESCRIPTION - FREQUENCY: FREQUENCY: CONTINUOUS

## 2017-09-06 ASSESSMENT — PAIN DESCRIPTION - ONSET: ONSET: ON-GOING

## 2017-09-06 ASSESSMENT — PAIN DESCRIPTION - DESCRIPTORS: DESCRIPTORS: ACHING;BURNING

## 2017-09-06 ASSESSMENT — PAIN DESCRIPTION - ORIENTATION: ORIENTATION: RIGHT

## 2017-09-06 ASSESSMENT — PAIN SCALES - GENERAL: PAINLEVEL_OUTOF10: 8

## 2017-09-08 ENCOUNTER — HOSPITAL ENCOUNTER (OUTPATIENT)
Dept: WOUND CARE | Age: 52
Discharge: HOME | End: 2017-09-08
Attending: PODIATRIST | Admitting: PODIATRIST

## 2017-09-08 ENCOUNTER — TELEPHONE (OUTPATIENT)
Dept: WOUND CARE | Age: 52
End: 2017-09-08

## 2017-09-08 VITALS
DIASTOLIC BLOOD PRESSURE: 78 MMHG | RESPIRATION RATE: 16 BRPM | HEART RATE: 80 BPM | SYSTOLIC BLOOD PRESSURE: 133 MMHG | TEMPERATURE: 98.3 F

## 2017-09-08 ASSESSMENT — PAIN DESCRIPTION - ORIENTATION: ORIENTATION: RIGHT

## 2017-09-08 ASSESSMENT — PAIN DESCRIPTION - PROGRESSION: CLINICAL_PROGRESSION: GRADUALLY IMPROVING

## 2017-09-08 ASSESSMENT — PAIN DESCRIPTION - ONSET: ONSET: ON-GOING

## 2017-09-08 ASSESSMENT — PAIN DESCRIPTION - FREQUENCY: FREQUENCY: CONTINUOUS

## 2017-09-08 ASSESSMENT — PAIN DESCRIPTION - DESCRIPTORS: DESCRIPTORS: ACHING;BURNING

## 2017-09-08 ASSESSMENT — PAIN DESCRIPTION - PAIN TYPE: TYPE: ACUTE PAIN

## 2017-09-08 ASSESSMENT — PAIN SCALES - GENERAL: PAINLEVEL_OUTOF10: 8

## 2017-09-08 ASSESSMENT — PAIN DESCRIPTION - LOCATION: LOCATION: LEG

## 2017-09-11 ENCOUNTER — TELEPHONE (OUTPATIENT)
Dept: WOUND CARE | Age: 52
End: 2017-09-11

## 2017-09-11 ENCOUNTER — HOSPITAL ENCOUNTER (OUTPATIENT)
Dept: CT IMAGING | Age: 52
Discharge: HOME | End: 2017-09-11
Attending: SPECIALIST | Admitting: SPECIALIST

## 2017-09-11 DIAGNOSIS — I87.311 IDIOPATHIC CHRONIC VENOUS HYPERTENSION OF RIGHT LOWER EXTREMITY WITH ULCER (HCC): ICD-10-CM

## 2017-09-11 DIAGNOSIS — I74.5 THROMBOSIS OF ILIAC ARTERY (HCC): ICD-10-CM

## 2017-09-11 DIAGNOSIS — I74.5 EMBOLISM AND THROMBOSIS OF ILIAC ARTERY (HCC): ICD-10-CM

## 2017-09-11 DIAGNOSIS — L97.919 IDIOPATHIC CHRONIC VENOUS HYPERTENSION OF RIGHT LOWER EXTREMITY WITH ULCER (HCC): ICD-10-CM

## 2017-09-13 ENCOUNTER — HOSPITAL ENCOUNTER (OUTPATIENT)
Dept: WOUND CARE | Age: 52
Discharge: HOME | End: 2017-09-13
Attending: SPECIALIST | Admitting: SPECIALIST

## 2017-09-13 VITALS
SYSTOLIC BLOOD PRESSURE: 128 MMHG | TEMPERATURE: 98.2 F | RESPIRATION RATE: 16 BRPM | HEART RATE: 76 BPM | DIASTOLIC BLOOD PRESSURE: 79 MMHG

## 2017-09-13 DIAGNOSIS — I74.5 THROMBOSIS OF ILIAC ARTERY (HCC): ICD-10-CM

## 2017-09-13 DIAGNOSIS — I89.0 CHRONIC ACQUIRED LYMPHEDEMA: Primary | ICD-10-CM

## 2017-09-13 DIAGNOSIS — L97.919 IDIOPATHIC CHRONIC VENOUS HYPERTENSION OF RIGHT LOWER EXTREMITY WITH ULCER (HCC): ICD-10-CM

## 2017-09-13 DIAGNOSIS — I82.423 THROMBOSIS OF BOTH ILIAC VEINS (HCC): ICD-10-CM

## 2017-09-13 DIAGNOSIS — I87.311 IDIOPATHIC CHRONIC VENOUS HYPERTENSION OF RIGHT LOWER EXTREMITY WITH ULCER (HCC): ICD-10-CM

## 2017-09-13 PROCEDURE — 11042 DBRDMT SUBQ TIS 1ST 20SQCM/<: CPT | Performed by: SPECIALIST

## 2017-09-13 PROCEDURE — 11045 DBRDMT SUBQ TISS EACH ADDL: CPT | Performed by: SPECIALIST

## 2017-09-14 ENCOUNTER — HOSPITAL ENCOUNTER (OUTPATIENT)
Dept: WOUND CARE | Age: 52
Discharge: HOME | End: 2017-09-14
Attending: SPECIALIST | Admitting: SPECIALIST

## 2017-09-14 VITALS
RESPIRATION RATE: 16 BRPM | DIASTOLIC BLOOD PRESSURE: 83 MMHG | SYSTOLIC BLOOD PRESSURE: 156 MMHG | TEMPERATURE: 98 F | HEART RATE: 79 BPM

## 2017-09-14 ASSESSMENT — PAIN DESCRIPTION - ORIENTATION: ORIENTATION: RIGHT

## 2017-09-14 ASSESSMENT — PAIN DESCRIPTION - PROGRESSION: CLINICAL_PROGRESSION: NOT CHANGED

## 2017-09-14 ASSESSMENT — PAIN DESCRIPTION - LOCATION: LOCATION: LEG

## 2017-09-14 ASSESSMENT — PAIN DESCRIPTION - ONSET: ONSET: ON-GOING

## 2017-09-14 ASSESSMENT — PAIN SCALES - GENERAL: PAINLEVEL_OUTOF10: 8

## 2017-09-14 ASSESSMENT — PAIN DESCRIPTION - PAIN TYPE: TYPE: ACUTE PAIN

## 2017-09-14 ASSESSMENT — PAIN DESCRIPTION - FREQUENCY: FREQUENCY: CONTINUOUS

## 2017-09-14 ASSESSMENT — PAIN DESCRIPTION - DESCRIPTORS: DESCRIPTORS: BURNING

## 2017-09-15 ENCOUNTER — HOSPITAL ENCOUNTER (OUTPATIENT)
Dept: WOUND CARE | Age: 52
Discharge: HOME | End: 2017-09-15
Attending: SPECIALIST | Admitting: SPECIALIST

## 2017-09-15 VITALS
RESPIRATION RATE: 16 BRPM | TEMPERATURE: 98.3 F | DIASTOLIC BLOOD PRESSURE: 81 MMHG | HEART RATE: 67 BPM | SYSTOLIC BLOOD PRESSURE: 139 MMHG

## 2017-09-15 DIAGNOSIS — I82.423 THROMBOSIS OF BOTH ILIAC VEINS (HCC): Primary | ICD-10-CM

## 2017-09-15 DIAGNOSIS — I87.311 IDIOPATHIC CHRONIC VENOUS HYPERTENSION OF RIGHT LOWER EXTREMITY WITH ULCER (HCC): ICD-10-CM

## 2017-09-15 DIAGNOSIS — L97.919 IDIOPATHIC CHRONIC VENOUS HYPERTENSION OF RIGHT LOWER EXTREMITY WITH ULCER (HCC): ICD-10-CM

## 2017-09-15 DIAGNOSIS — I89.0 CHRONIC ACQUIRED LYMPHEDEMA: ICD-10-CM

## 2017-09-15 ASSESSMENT — PAIN DESCRIPTION - ORIENTATION: ORIENTATION: RIGHT

## 2017-09-15 ASSESSMENT — PAIN DESCRIPTION - DESCRIPTORS: DESCRIPTORS: BURNING

## 2017-09-15 ASSESSMENT — PAIN SCALES - GENERAL: PAINLEVEL_OUTOF10: 7

## 2017-09-15 ASSESSMENT — PAIN DESCRIPTION - PAIN TYPE: TYPE: ACUTE PAIN

## 2017-09-15 ASSESSMENT — PAIN DESCRIPTION - LOCATION: LOCATION: LEG

## 2017-09-15 ASSESSMENT — PAIN DESCRIPTION - ONSET: ONSET: ON-GOING

## 2017-09-15 ASSESSMENT — PAIN DESCRIPTION - PROGRESSION: CLINICAL_PROGRESSION: NOT CHANGED

## 2017-09-15 ASSESSMENT — PAIN DESCRIPTION - FREQUENCY: FREQUENCY: CONTINUOUS

## 2017-09-18 ENCOUNTER — HOSPITAL ENCOUNTER (OUTPATIENT)
Dept: WOUND CARE | Age: 52
Discharge: HOME | End: 2017-09-18
Attending: SPECIALIST | Admitting: SPECIALIST

## 2017-09-18 VITALS
SYSTOLIC BLOOD PRESSURE: 138 MMHG | RESPIRATION RATE: 16 BRPM | HEART RATE: 82 BPM | DIASTOLIC BLOOD PRESSURE: 74 MMHG | TEMPERATURE: 98.3 F

## 2017-09-18 DIAGNOSIS — L97.919 IDIOPATHIC CHRONIC VENOUS HYPERTENSION OF RIGHT LOWER EXTREMITY WITH ULCER (HCC): ICD-10-CM

## 2017-09-18 DIAGNOSIS — I82.423 THROMBOSIS OF BOTH ILIAC VEINS (HCC): Primary | ICD-10-CM

## 2017-09-18 DIAGNOSIS — I89.0 CHRONIC ACQUIRED LYMPHEDEMA: ICD-10-CM

## 2017-09-18 DIAGNOSIS — I82.220 THROMBOSIS OF INFERIOR VENA CAVA (HCC): ICD-10-CM

## 2017-09-18 DIAGNOSIS — Z79.01 CHRONIC ANTICOAGULATION: ICD-10-CM

## 2017-09-18 DIAGNOSIS — I87.311 IDIOPATHIC CHRONIC VENOUS HYPERTENSION OF RIGHT LOWER EXTREMITY WITH ULCER (HCC): ICD-10-CM

## 2017-09-18 PROCEDURE — 11045 DBRDMT SUBQ TISS EACH ADDL: CPT | Performed by: SPECIALIST

## 2017-09-18 PROCEDURE — 11042 DBRDMT SUBQ TIS 1ST 20SQCM/<: CPT | Performed by: SPECIALIST

## 2017-09-18 RX ORDER — LIDOCAINE HYDROCHLORIDE 40 MG/ML
2.5 SOLUTION TOPICAL ONCE
Status: COMPLETED | OUTPATIENT
Start: 2017-09-18 | End: 2017-09-18

## 2017-09-18 RX ADMIN — LIDOCAINE HYDROCHLORIDE 2.5 ML: 40 SOLUTION TOPICAL at 10:18

## 2017-09-18 ASSESSMENT — PAIN DESCRIPTION - ORIENTATION: ORIENTATION: RIGHT

## 2017-09-18 ASSESSMENT — PAIN DESCRIPTION - PROGRESSION: CLINICAL_PROGRESSION: NOT CHANGED

## 2017-09-18 ASSESSMENT — PAIN DESCRIPTION - ONSET: ONSET: ON-GOING

## 2017-09-18 ASSESSMENT — PAIN SCALES - GENERAL: PAINLEVEL_OUTOF10: 7

## 2017-09-18 ASSESSMENT — PAIN DESCRIPTION - FREQUENCY: FREQUENCY: CONTINUOUS

## 2017-09-18 ASSESSMENT — PAIN DESCRIPTION - PAIN TYPE: TYPE: ACUTE PAIN

## 2017-09-19 ENCOUNTER — HOSPITAL ENCOUNTER (OUTPATIENT)
Dept: WOUND CARE | Age: 52
Discharge: HOME | End: 2017-09-19
Attending: SPECIALIST | Admitting: SPECIALIST

## 2017-09-19 ENCOUNTER — TELEPHONE (OUTPATIENT)
Dept: WOUND CARE | Age: 52
End: 2017-09-19

## 2017-09-19 VITALS
RESPIRATION RATE: 16 BRPM | TEMPERATURE: 98.3 F | HEART RATE: 77 BPM | DIASTOLIC BLOOD PRESSURE: 84 MMHG | SYSTOLIC BLOOD PRESSURE: 152 MMHG

## 2017-09-19 ASSESSMENT — PAIN SCALES - GENERAL: PAINLEVEL_OUTOF10: 6

## 2017-09-19 ASSESSMENT — PAIN DESCRIPTION - PROGRESSION: CLINICAL_PROGRESSION: NOT CHANGED

## 2017-09-19 ASSESSMENT — PAIN DESCRIPTION - LOCATION: LOCATION: LEG

## 2017-09-19 ASSESSMENT — PAIN DESCRIPTION - DESCRIPTORS: DESCRIPTORS: BURNING

## 2017-09-19 ASSESSMENT — PAIN DESCRIPTION - ORIENTATION: ORIENTATION: RIGHT

## 2017-09-19 ASSESSMENT — PAIN DESCRIPTION - FREQUENCY: FREQUENCY: CONTINUOUS

## 2017-09-19 ASSESSMENT — PAIN DESCRIPTION - PAIN TYPE: TYPE: ACUTE PAIN

## 2017-09-19 ASSESSMENT — PAIN DESCRIPTION - ONSET: ONSET: ON-GOING

## 2017-09-21 ENCOUNTER — HOSPITAL ENCOUNTER (OUTPATIENT)
Dept: WOUND CARE | Age: 52
Discharge: HOME | End: 2017-09-21
Attending: SPECIALIST | Admitting: SPECIALIST

## 2017-09-21 VITALS
TEMPERATURE: 98.1 F | RESPIRATION RATE: 18 BRPM | SYSTOLIC BLOOD PRESSURE: 134 MMHG | HEART RATE: 72 BPM | DIASTOLIC BLOOD PRESSURE: 79 MMHG

## 2017-09-21 ASSESSMENT — PAIN DESCRIPTION - LOCATION: LOCATION: LEG

## 2017-09-21 ASSESSMENT — PAIN DESCRIPTION - PAIN TYPE: TYPE: ACUTE PAIN

## 2017-09-21 ASSESSMENT — PAIN SCALES - GENERAL: PAINLEVEL_OUTOF10: 5

## 2017-09-21 ASSESSMENT — PAIN DESCRIPTION - DESCRIPTORS: DESCRIPTORS: ACHING;BURNING

## 2017-09-21 ASSESSMENT — PAIN DESCRIPTION - ORIENTATION: ORIENTATION: RIGHT

## 2017-09-22 ENCOUNTER — HOSPITAL ENCOUNTER (OUTPATIENT)
Dept: WOUND CARE | Age: 52
Discharge: HOME | End: 2017-09-22
Attending: SPECIALIST | Admitting: SPECIALIST

## 2017-09-22 VITALS
SYSTOLIC BLOOD PRESSURE: 133 MMHG | HEART RATE: 76 BPM | DIASTOLIC BLOOD PRESSURE: 79 MMHG | RESPIRATION RATE: 16 BRPM | TEMPERATURE: 98.1 F

## 2017-09-25 ENCOUNTER — HOSPITAL ENCOUNTER (OUTPATIENT)
Dept: WOUND CARE | Age: 52
Discharge: HOME | End: 2017-09-25
Attending: SPECIALIST | Admitting: SPECIALIST

## 2017-09-25 DIAGNOSIS — I82.220 THROMBOSIS OF INFERIOR VENA CAVA (HCC): Primary | ICD-10-CM

## 2017-09-25 DIAGNOSIS — I74.5 THROMBOSIS OF ILIAC ARTERY (HCC): ICD-10-CM

## 2017-09-25 DIAGNOSIS — M79.89 PAIN AND SWELLING OF RIGHT LOWER LEG: ICD-10-CM

## 2017-09-25 DIAGNOSIS — L97.919 IDIOPATHIC CHRONIC VENOUS HYPERTENSION OF RIGHT LOWER EXTREMITY WITH ULCER (HCC): ICD-10-CM

## 2017-09-25 DIAGNOSIS — I89.0 CHRONIC ACQUIRED LYMPHEDEMA: ICD-10-CM

## 2017-09-25 DIAGNOSIS — I82.423 THROMBOSIS OF BOTH ILIAC VEINS (HCC): ICD-10-CM

## 2017-09-25 DIAGNOSIS — I87.311 IDIOPATHIC CHRONIC VENOUS HYPERTENSION OF RIGHT LOWER EXTREMITY WITH ULCER (HCC): ICD-10-CM

## 2017-09-25 DIAGNOSIS — M79.661 PAIN AND SWELLING OF RIGHT LOWER LEG: ICD-10-CM

## 2017-09-25 PROCEDURE — 97598 DBRDMT OPN WND ADDL 20CM/<: CPT | Performed by: SPECIALIST

## 2017-09-25 PROCEDURE — 97597 DBRDMT OPN WND 1ST 20 CM/<: CPT | Performed by: SPECIALIST

## 2017-09-25 RX ORDER — LIDOCAINE HYDROCHLORIDE 40 MG/ML
2.5 SOLUTION TOPICAL ONCE
Status: COMPLETED | OUTPATIENT
Start: 2017-09-25 | End: 2017-09-25

## 2017-09-25 RX ADMIN — LIDOCAINE HYDROCHLORIDE 2.5 ML: 40 SOLUTION TOPICAL at 08:38

## 2017-09-26 ENCOUNTER — HOSPITAL ENCOUNTER (OUTPATIENT)
Dept: OTHER | Age: 52
Discharge: HOME | End: 2017-09-26
Attending: SPECIALIST | Admitting: SPECIALIST

## 2017-09-26 VITALS
RESPIRATION RATE: 18 BRPM | HEART RATE: 73 BPM | SYSTOLIC BLOOD PRESSURE: 135 MMHG | TEMPERATURE: 98 F | DIASTOLIC BLOOD PRESSURE: 79 MMHG

## 2017-09-26 ASSESSMENT — PAIN DESCRIPTION - LOCATION: LOCATION: LEG

## 2017-09-26 ASSESSMENT — PAIN DESCRIPTION - FREQUENCY: FREQUENCY: CONTINUOUS

## 2017-09-26 ASSESSMENT — PAIN DESCRIPTION - PROGRESSION: CLINICAL_PROGRESSION: NOT CHANGED

## 2017-09-26 ASSESSMENT — PAIN DESCRIPTION - ORIENTATION: ORIENTATION: RIGHT

## 2017-09-26 ASSESSMENT — PAIN DESCRIPTION - PAIN TYPE: TYPE: ACUTE PAIN

## 2017-09-26 ASSESSMENT — PAIN DESCRIPTION - ONSET: ONSET: ON-GOING

## 2017-09-26 ASSESSMENT — PAIN SCALES - GENERAL: PAINLEVEL_OUTOF10: 9

## 2017-09-26 ASSESSMENT — PAIN DESCRIPTION - DESCRIPTORS: DESCRIPTORS: BURNING

## 2017-09-27 ENCOUNTER — HOSPITAL ENCOUNTER (OUTPATIENT)
Dept: WOUND CARE | Age: 52
Discharge: HOME | End: 2017-09-27
Admitting: SPECIALIST

## 2017-09-27 ENCOUNTER — HOSPITAL ENCOUNTER (OUTPATIENT)
Dept: OTHER | Age: 52
Discharge: HOME | End: 2017-09-27
Attending: SPECIALIST | Admitting: SPECIALIST

## 2017-09-27 VITALS
HEART RATE: 70 BPM | SYSTOLIC BLOOD PRESSURE: 134 MMHG | DIASTOLIC BLOOD PRESSURE: 77 MMHG | RESPIRATION RATE: 16 BRPM | TEMPERATURE: 98.2 F

## 2017-09-27 LAB
GRAM STAIN RESULT: ABNORMAL
ORGANISM: ABNORMAL
ORGANISM: ABNORMAL
WOUND/ABSCESS: ABNORMAL

## 2017-09-27 RX ORDER — CIPROFLOXACIN 500 MG/1
500 TABLET, FILM COATED ORAL 2 TIMES DAILY
COMMUNITY
Start: 2017-09-27 | End: 2017-10-02

## 2017-09-27 RX ORDER — CIPROFLOXACIN 500 MG/1
500 TABLET, FILM COATED ORAL 2 TIMES DAILY
Qty: 20 TABLET | Refills: 0 | Status: SHIPPED | OUTPATIENT
Start: 2017-09-27 | End: 2017-10-07

## 2017-09-27 ASSESSMENT — PAIN DESCRIPTION - DESCRIPTORS: DESCRIPTORS: BURNING

## 2017-09-27 ASSESSMENT — PAIN DESCRIPTION - ORIENTATION: ORIENTATION: RIGHT

## 2017-09-27 ASSESSMENT — PAIN DESCRIPTION - LOCATION: LOCATION: LEG

## 2017-09-27 ASSESSMENT — PAIN DESCRIPTION - ONSET: ONSET: ON-GOING

## 2017-09-27 ASSESSMENT — PAIN DESCRIPTION - PAIN TYPE: TYPE: ACUTE PAIN

## 2017-09-27 ASSESSMENT — PAIN DESCRIPTION - PROGRESSION: CLINICAL_PROGRESSION: NOT CHANGED

## 2017-09-27 ASSESSMENT — PAIN SCALES - GENERAL: PAINLEVEL_OUTOF10: 8

## 2017-09-27 ASSESSMENT — PAIN DESCRIPTION - FREQUENCY: FREQUENCY: CONTINUOUS

## 2017-09-28 ENCOUNTER — HOSPITAL ENCOUNTER (OUTPATIENT)
Dept: WOUND CARE | Age: 52
Discharge: HOME | End: 2017-09-28
Attending: PODIATRIST | Admitting: PODIATRIST

## 2017-09-28 VITALS
SYSTOLIC BLOOD PRESSURE: 134 MMHG | TEMPERATURE: 97.8 F | HEART RATE: 68 BPM | DIASTOLIC BLOOD PRESSURE: 75 MMHG | RESPIRATION RATE: 18 BRPM

## 2017-09-28 ASSESSMENT — PAIN DESCRIPTION - ONSET: ONSET: ON-GOING

## 2017-09-28 ASSESSMENT — PAIN DESCRIPTION - ORIENTATION: ORIENTATION: RIGHT

## 2017-09-28 ASSESSMENT — PAIN SCALES - GENERAL: PAINLEVEL_OUTOF10: 5

## 2017-09-28 ASSESSMENT — PAIN DESCRIPTION - DESCRIPTORS: DESCRIPTORS: BURNING

## 2017-09-28 ASSESSMENT — PAIN DESCRIPTION - LOCATION: LOCATION: LEG

## 2017-09-28 ASSESSMENT — PAIN DESCRIPTION - PAIN TYPE: TYPE: ACUTE PAIN

## 2017-09-28 ASSESSMENT — PAIN DESCRIPTION - PROGRESSION: CLINICAL_PROGRESSION: NOT CHANGED

## 2017-09-28 ASSESSMENT — PAIN DESCRIPTION - FREQUENCY: FREQUENCY: CONTINUOUS

## 2017-09-29 ENCOUNTER — HOSPITAL ENCOUNTER (OUTPATIENT)
Dept: OTHER | Age: 52
Discharge: HOME | End: 2017-09-29
Attending: SPECIALIST | Admitting: SPECIALIST

## 2017-09-29 ENCOUNTER — HOSPITAL ENCOUNTER (OUTPATIENT)
Dept: WOUND CARE | Age: 52
Discharge: HOME | End: 2017-09-29
Admitting: PODIATRIST

## 2017-09-29 VITALS
DIASTOLIC BLOOD PRESSURE: 70 MMHG | RESPIRATION RATE: 18 BRPM | HEART RATE: 76 BPM | TEMPERATURE: 97.6 F | SYSTOLIC BLOOD PRESSURE: 148 MMHG

## 2017-09-29 ASSESSMENT — PAIN SCALES - GENERAL: PAINLEVEL_OUTOF10: 6

## 2017-09-29 ASSESSMENT — PAIN DESCRIPTION - DESCRIPTORS: DESCRIPTORS: BURNING

## 2017-09-29 ASSESSMENT — PAIN DESCRIPTION - ONSET: ONSET: ON-GOING

## 2017-09-29 ASSESSMENT — PAIN DESCRIPTION - PAIN TYPE: TYPE: ACUTE PAIN

## 2017-09-29 ASSESSMENT — PAIN DESCRIPTION - ORIENTATION: ORIENTATION: RIGHT

## 2017-09-29 ASSESSMENT — PAIN DESCRIPTION - PROGRESSION: CLINICAL_PROGRESSION: NOT CHANGED

## 2017-09-29 ASSESSMENT — PAIN DESCRIPTION - LOCATION: LOCATION: LEG

## 2017-09-29 ASSESSMENT — PAIN DESCRIPTION - FREQUENCY: FREQUENCY: CONTINUOUS

## 2017-09-29 NOTE — IP AVS SNAPSHOT
Patient Information     Patient Name IGNACIO Zapata 1965      WARFARIN INFORMATION       What is the most important information you should know about warfarin? Warfarin is a medicine that you take to prevent blood clots. It is often called a blood thinner. Doctors give warfarin (such as Coumadin) to reduce the risk of blood clots. Warfarin/Coumadin Instructions:  ? It is important to take your anticoagulant medication as instructed, and notify your physician if you are unable to for any reason. ? Complete any blood tests ordered for monitoring your medication; such as PT/INR, so your physician can adjust the dose if necessary. ? Eat a consistent amount of foods with Vitamin K, such as leafy greens. ? Avoid major changes in your dietary habits, or notify your health professional before changing habits. ? Follow up with your health care provider or clinic as directed by your physician for monitoring your anticoagulant medication. ? Diet and medication can affect your anticoagulant and PT/INR blood test.   ? Do not take or discontinue any medication or over-the-counter medication except on the advice of your physician or pharmacist.   ? Anticoagulants can increase the risk of bleeding.

## 2017-09-29 NOTE — IP AVS SNAPSHOT
includes details about your hospital/visit stay along with steps you should take to help with your recovery once you leave the hospital.  In this packet, you will find information about the topics listed below:    · Instructions about your medications including a list of your home medications  · A summary of your hospital visit  · Follow-up appointments once you have left the hospital  · Your care plan at home      You may receive a survey regarding the care you received during your stay. Your input is valuable to us. We encourage you to complete and return your survey in the envelope provided. We hope you will choose us in the future for your healthcare needs. Patient Information     Patient Name Justin Traore 1965      Care Provided at:     Name Address Phone       [address] [address] [phone]            Your Visit    Here you will find information about your visit, including the reason for your visit. Please take this sheet with you when you visit your doctor or other health care provider in the future. It will help determine the best possible medical care for you at that time. If you have any questions once you leave the hospital, please call the department phone number listed below. Why you were here     Your primary diagnosis was:  Not on File      Visit Information     Date & Time Department Dept. Phone    9/29/2017 [facility] WOUND CARE [phone]       Follow-up Appointments    Below is a list of your follow-up and future appointments. This may not be a complete list as you may have made appointments directly with providers that we are not aware of or your providers may have made some for you. Please call your providers to confirm appointments. It is important to keep your appointments.  Please bring your current insurance card, photo ID, co-pay, and all medication bottles to your 4. Enter your Social Security Number ([id number]) and Date of Birth (mm/dd/yyyy) as indicated and click Submit. You will be taken to the next sign-up page. 5. Create a [employer] ID. This will be your [employer] login ID and cannot be changed, so think of one that is secure and easy to remember. 6. Create a [employer] password. You can change your password at any time. 7. Enter your Password Reset Question and Answer. This can be used at a later time if you forget your password. 8. Enter your e-mail address. You will receive e-mail notification when new information is available in [address] 9. Click Sign Up. You can now view your medical record. Additional Information  If you have questions, please contact the physician practice where you receive care. Remember, [employer] is NOT to be used for urgent needs. For medical emergencies, dial 911. For questions regarding your [employer] account call [phone]. If you have a clinical question, please call your doctor's office. View your information online  ? Review your current list of  medications, immunization, and allergies. ? Review your future test results online . ? Review your discharge instructions provided by your caregivers at discharge    Certain functionality such as prescription refills, scheduling appointments or sending messages to your provider are not activated if your provider does not use [employer] in his/her office    For questions regarding your [employer] account call [phone]. If you have a clinical question, please call your doctor's office. The information on all pages of the After Visit Summary has been reviewed with me, the patient and/or responsible adult, by my health care provider(s). I had the opportunity to ask questions regarding this information. I understand I should dispose of my armband safely at home to protect my health information.  A complete copy of the After Visit Summary

## 2017-09-29 NOTE — PROGRESS NOTES
Multilayer Compression Wrap   (Not Unna) Below the Knee    NAME:  Adam Nice  YOB: 1965  MEDICAL RECORD NUMBER:  [id number]  DATE:  9/29/2017       [x] Removed old Multilayer wrap if indicated and wash leg with mild soap/water.  [x] Applied moisturizing agent to dry skin as needed.  [x] Applied primary and secondary dressing as ordered    [x] Applied multilayered dressing below the right  knee to  Profore per 's instructions.  [x] Instructed patient/caregiver not to remove dressing and to keep it clean and dry.  [x] Instructed patient/caregiver on complications to report to provider, such as pain, numbness in toes, heavy drainage, and slippage of dressing.  [x] Instructed patient on purpose of compression dressing and on activity and exercise recommendations.        Applied per   Guidelines    Electronically signed by Saji Ventura RN on 9/29/2017 at 9:42 AM

## 2017-10-02 ENCOUNTER — HOSPITAL ENCOUNTER (OUTPATIENT)
Dept: WOUND CARE | Age: 52
Discharge: HOME | End: 2017-10-02
Attending: SPECIALIST | Admitting: SPECIALIST

## 2017-10-02 ENCOUNTER — HOSPITAL ENCOUNTER (OUTPATIENT)
Dept: MRI IMAGING | Age: 52
Discharge: HOME | End: 2017-10-02
Attending: SPECIALIST | Admitting: SPECIALIST

## 2017-10-02 VITALS
TEMPERATURE: 97.5 F | RESPIRATION RATE: 16 BRPM | SYSTOLIC BLOOD PRESSURE: 137 MMHG | DIASTOLIC BLOOD PRESSURE: 91 MMHG | HEART RATE: 73 BPM

## 2017-10-02 DIAGNOSIS — I82.423 THROMBOSIS OF BOTH ILIAC VEINS (HCC): ICD-10-CM

## 2017-10-02 DIAGNOSIS — I82.220 THROMBOSIS OF INFERIOR VENA CAVA (HCC): ICD-10-CM

## 2017-10-02 DIAGNOSIS — L97.919 IDIOPATHIC CHRONIC VENOUS HYPERTENSION OF RIGHT LOWER EXTREMITY WITH ULCER (HCC): ICD-10-CM

## 2017-10-02 DIAGNOSIS — I87.311 IDIOPATHIC CHRONIC VENOUS HYPERTENSION OF RIGHT LOWER EXTREMITY WITH ULCER (HCC): ICD-10-CM

## 2017-10-02 DIAGNOSIS — I89.0 CHRONIC ACQUIRED LYMPHEDEMA: Primary | ICD-10-CM

## 2017-10-02 DIAGNOSIS — M79.661 PAIN AND SWELLING OF RIGHT LOWER LEG: ICD-10-CM

## 2017-10-02 DIAGNOSIS — M79.89 PAIN AND SWELLING OF RIGHT LOWER LEG: ICD-10-CM

## 2017-10-02 DIAGNOSIS — I82.220 ACUTE EMBOLISM AND THROMBOSIS OF INFERIOR VENA CAVA (HCC): ICD-10-CM

## 2017-10-02 DIAGNOSIS — I74.5 THROMBOSIS OF ILIAC ARTERY (HCC): ICD-10-CM

## 2017-10-02 PROCEDURE — 11045 DBRDMT SUBQ TISS EACH ADDL: CPT | Performed by: SPECIALIST

## 2017-10-02 PROCEDURE — 11042 DBRDMT SUBQ TIS 1ST 20SQCM/<: CPT | Performed by: SPECIALIST

## 2017-10-02 RX ORDER — LIDOCAINE HYDROCHLORIDE 40 MG/ML
2.5 SOLUTION TOPICAL ONCE
Status: COMPLETED | OUTPATIENT
Start: 2017-10-02 | End: 2017-10-02

## 2017-10-02 RX ADMIN — LIDOCAINE HYDROCHLORIDE 2.5 ML: 40 SOLUTION TOPICAL at 09:53

## 2017-10-02 NOTE — PROGRESS NOTES
Amount Copious 10/2/2017  9:52 AM   Drainage Description Yellow 10/2/2017  9:52 AM   Odor Mild 10/2/2017  9:52 AM   Number of days:209       Wound 03/06/17 Leg Right; Lower;Medial #2 venous/ lymphedema (stage 3) (Active)   Wound Type Wound 10/2/2017  9:52 AM   Wound Venous 9/25/2017  8:40 AM   Dressing Status Intact 8/16/2017  9:04 AM   Dressing/Treatment Other (Comment) 10/2/2017  9:52 AM   Wound Cleansed Rinsed/Irrigated with saline 10/2/2017  9:52 AM   Wound Length (cm) 8.3 cm 10/2/2017  9:52 AM   Wound Width (cm) 7.6 cm 10/2/2017  9:52 AM   Wound Depth (cm)  0.1 10/2/2017  9:52 AM   Calculated Wound Size (cm^2) (l*w) 63.08 cm^2 10/2/2017  9:52 AM   Change in Wound Size % (l*w) -54.61 10/2/2017  9:52 AM   Distance Tunneling (cm) 0 cm 8/23/2017  8:57 AM   Tunneling Position ___ O'Clock 0 4/7/2017  1:15 PM   Undermining Starts ___ O'Clock 0 8/21/2017 10:38 AM   Undermining Ends___ O'Clock 0 5/30/2017  8:40 AM   Undermining Maxium Distance (cm) 0 8/23/2017  8:57 AM   Wound Assessment Red;Yellow 10/2/2017  9:52 AM   Margins Defined edges 10/2/2017  9:52 AM   Kiah-wound Assessment Bleeding; Excoriated 10/2/2017  9:52 AM   Non-staged Wound Description Full thickness 10/2/2017  9:52 AM   [place]%Wound Bed 15 8/28/2017 10:37 AM   Red%Wound Bed 10 9/27/2017  8:28 AM   Yellow%Wound Bed 70 10/2/2017  9:52 AM   Black%Wound Bed 30 10/2/2017  9:52 AM   Purple%Wound Bed 0 5/8/2017 10:35 AM   Other%Wound Bed 5 9/5/2017  8:45 AM   Drainage Amount Copious 10/2/2017  9:52 AM   Drainage Description Yellow 10/2/2017  9:52 AM   Odor Mild 10/2/2017  9:52 AM   Number of days:209       Wound 03/06/17 Leg Right; Lower; Posterior #3 venous/ lymphedema (stage 3) (Active)   Wound Type Wound 10/2/2017  9:52 AM   Wound Venous 9/25/2017  8:40 AM   Dressing Status Intact 9/15/2017  8:42 AM   Dressing/Treatment Other (Comment) 10/2/2017  9:52 AM   Wound Cleansed Rinsed/Irrigated with saline 10/2/2017  9:52 AM   Wound Length (cm) 6 cm 10/2/2017  9:52 AM Wound Width (cm) 6 cm 10/2/2017  9:52 AM   Wound Depth (cm)  0.1 10/2/2017  9:52 AM   Calculated Wound Size (cm^2) (l*w) 36 cm^2 10/2/2017  9:52 AM   Change in Wound Size % (l*w) -30.91 10/2/2017  9:52 AM   Distance Tunneling (cm) 0 cm 8/14/2017 11:06 AM   Tunneling Position ___ O'Clock 0 8/18/2017  2:08 PM   Undermining Ends___ O'Clock 0 8/18/2017  2:08 PM   Undermining Maxium Distance (cm) 0 8/16/2017  9:04 AM   Wound Assessment Red;Yellow;Pink 10/2/2017  9:52 AM   Margins Defined edges 10/2/2017  9:52 AM   Kiah-wound Assessment Red 10/2/2017  9:52 AM   Non-staged Wound Description Full thickness 10/2/2017  9:52 AM   [place]%Wound Bed 30 10/2/2017  9:52 AM   Red%Wound Bed 40 10/2/2017  9:52 AM   Yellow%Wound Bed 30 10/2/2017  9:52 AM   Black%Wound Bed 0 8/25/2017  2:26 PM   Purple%Wound Bed 0 5/8/2017 10:35 AM   Other%Wound Bed 0 8/7/2017  9:52 AM   Drainage Amount Copious 10/2/2017  9:52 AM   Drainage Description Yellow 10/2/2017  9:52 AM   Odor Mild 10/2/2017  9:52 AM   Number of days:209       Percent of Wound Debrided: 50%    Total Surface Area Debrided:  66.7 sq cm    Non Pressure Ulcers only:  [] Limited to breakdown of the skin [x] With fat layer exposed  [] With necrosis of muscle  [] With unspecified severity  [] With necrosis of bone  []       Bleeding: Minimal    Hemostasis Achieved: by pressure    Procedural Pain: 0  / 10     Post Procedural Pain: 0 / 10     Response to treatment:  Well tolerated by patient. Plan:     Treatment Note please see attached Discharge Instructions Patient underwent MRV today. Will discuss with Dr. Hayden Heredia (vascular). Is this Patient a candidate for HBO: No    Written Patient Dismissal Instructions Given       Patient is to return to wound care center in:  1 week(s)    Discharge [facility] Physician Orders and Discharge Instructions  The [place] [address] [address], [address]  Telephone: [phone] Gage [phone]    NAME:  Althea Victor  YOB: 1965  MEDICAL RECORD NUMBER:  [id number]  DATE:  10/2/2017    Wash hands with soap and water prior to and after every dressing change. Wound Cleansing:   · Do not scrub or use excessive force. · With each dressing change, rinse wounds with 0.9% Saline. (May use wound wash or soft contact solution. Both can be purchased at a local drug store). · If unable to obtain saline, may use a gentle soap and water. · Keep wounds dry in the shower unless otherwise instructed by the physician. Topical Treatments:  Do not apply lotions, creams, or ointments to wound bed unless directed. [] Apply moisturizing lotion to skin surrounding the wound prior to dressing change.  [] Apply antifungal ointment to skin surrounding the wound prior to dressing change.  [] Apply thin film of moisture barrier ointment to skin immediately around wound. [] Other:        Dressings:           Wound Location: right lower leg wounds     [x] Apply Primary Dressing to wound:       [] Foam/Foam with Border  [] Mepitel/Non-adherent/Xeroform   [x] Alginate with Silver    [] Alginate   [] Collagen [] Collagen with Silver     [] Hydrocolloid  [] Hydrafera Blue moistened with saline   [] MediHoney Paste/Gel [] Hydrogel      [] Santyl with Moisten saline gauze   [] Bactroban/Mupirocin [] Polysporin  [] Other:      Pack wound loosely with  [] Iodoform   [] Plain Packing  [] Other      [x] Cover and Secure with:     [] Gauze [x] ABD [] Stretch bandage roll [x] Kerlix   [x] Profore- 4 layer wrap [] Ace Wrap [] Cover Roll Tape    [x] Other: optilock   Avoid contact of tape with skin.     [x] Change dressing:  [] Daily    [] Every Other Day [] Three times per week   [] Once a week [x] Do Not Change Dressing   [] Other:          · CompressionType: profore - 4 layer wrap  ·  Multilayer Compression Wrap Applied in Clinic [x]Right Leg []Left Leg  · Do not get leg(s) with wrap wet.  ·  If wraps become too tight call the center or completely remove the wrap. · Elevate leg(s) above the level of the heart when sitting. · Avoid prolonged standing in one place. [] [facility] remove wrap, cleanse affected leg(s) with soap and water, apply wound dressings as ordered above and reapply compression wraps on:         Dietary:  Important dietary reminders:  1. Increase Protein intake (i.e. Lean meats, fish, eggs, legumes, and yogurt)  2. No added salt  3. If diabetic, follow a diabetic diet and check glucose prior to meals or as instructed by your physician. Return Appointment:  [] Wound and dressing supply provider:   [] ECF or Home Healthcare:  [x] Nurse visit: 10/4/17, 10/5/17    [x] Return Appointment: With Dr. Thony Waggoner  in  [address] [place] [address])      Your nurse  is: Shirley Bell     Electronically signed by Marcellus Michaud RN on 10/2/2017 at 10:08 AM     [facility] Information: Should you experience any significant changes in your wound(s) or have questions about your wound care, please contact the [address] [place] [address] at [phone] Monday and Wednesday 8:00 am - 2:00 pm and Tuesday, Thursday and Friday from 8:00 am - 4:30 pm.   If you need help with your wound outside these hours and cannot wait until we are again available, contact your PCP or go to the hospital emergency room. PLEASE NOTE: IF YOU ARE UNABLE TO OBTAIN WOUND SUPPLIES, CONTINUE TO USE THE SUPPLIES YOU HAVE AVAILABLE UNTIL YOU ARE ABLE TO REACH US. IT IS MOST IMPORTANT TO KEEP THE WOUND COVERED AT ALL TIMES.       Discharge Nurse Signature:_______________________    Date: ___________ Time:  ____________    Physician orders by:     [] Dr Pablo Marquez [] Dr Kim Torres    [] Dr Shlomo Sánchez     [] Dr Janet Heath   [x] Dr Sukhjinder Rivera  [] Dr Cindy Marmolejo       Physician Signature:__________________________________        The information contained in the After Visit Summary has

## 2017-10-02 NOTE — PROGRESS NOTES
Multilayer Compression Wrap   (Not Unna) Below the Knee    NAME:  Nino Park  YOB: 1965  MEDICAL RECORD NUMBER:  [id number]  DATE:  10/2/2017       [x] Removed old Multilayer wrap if indicated and wash leg with mild soap/water.  [x] Applied moisturizing agent to dry skin as needed.  [x] Applied primary and secondary dressing as ordered    [x] Applied multilayered dressing below the knee to  Right lower leg Profore- 4 layer wrap per 's instructions.  [x] Instructed patient/caregiver not to remove dressing and to keep it clean and dry.  [x] Instructed patient/caregiver on complications to report to provider, such as pain, numbness in toes, heavy drainage, and slippage of dressing.  [x] Instructed patient on purpose of compression dressing and on activity and exercise recommendations.        Applied per   Guidelines    Electronically signed by Will Leyva RN on 10/2/2017 at 10:12 AM

## 2017-10-02 NOTE — IP AVS SNAPSHOT
After Visit Summary  (Discharge Instructions)    Medication List for Home    Based on the information you provided to us as well as any changes during this visit, the following is your updated medication list.  Compare this with your prescription bottles at home. If you have any questions or concerns, contact your primary care physician's office. Daily Medication List (This medication list can be shared with any healthcare provider who is helping you manage your medications)      ASK your doctor about these medications if you have questions        Last Dose    Next Dose Due AM NOON PM NIGHT    ADVIL 200 MG Caps   Generic drug:  ibuprofen   Take 1 capsule by mouth every 4 hours as needed for Fever                                         ciprofloxacin 500 MG tablet   Commonly known as:  CIPRO   Take 1 tablet by mouth 2 times daily for 10 days   What changed:  Another medication with the same name was removed. Continue taking this medication, and follow the directions you see here. Handicap Placard Misc   by Does not apply route Dx Lower loeg DVT chronic, bilateral. Effective 3/17/2017 until 3/17/2018. Handicap Placard Misc   by Does not apply route Dx Lower loeg DVT chronic, bilateral. Effective 3/17/2017 until 3/17/2018. warfarin 5 MG tablet   Commonly known as:  COUMADIN   Take 1 tablet po every day. Add 1/2 tablet on Sunday, Tuesday, Thursday. Allergies as of 10/2/2017     No Known Allergies      Immunizations as of 10/2/2017     No immunizations on file. Last Vitals          Most Recent Value    Temp  97.5 °F (36.4 °C)    Pulse  73    Resp  16    BP  (!)  137/91         After Visit Summary    This summary was created for you. Thank you for entrusting your care to us.   The following information includes details about your hospital/visit stay along with steps you should take to help with your recovery once you leave the hospital.  In this packet, you will find information about the topics listed below:    · Instructions about your medications including a list of your home medications  · A summary of your hospital visit  · Follow-up appointments once you have left the hospital  · Your care plan at home      You may receive a survey regarding the care you received during your stay. Your input is valuable to us. We encourage you to complete and return your survey in the envelope provided. We hope you will choose us in the future for your healthcare needs. Patient Information     Patient Name Justin Taylor 1965      Care Provided at:     Name Address Phone       1 [address] [address] [phone]            Your Visit    Here you will find information about your visit, including the reason for your visit. Please take this sheet with you when you visit your doctor or other health care provider in the future. It will help determine the best possible medical care for you at that time. If you have any questions once you leave the hospital, please call the department phone number listed below. Why you were here     Your primary diagnosis was:  Not on File      Visit Information     Date & Time Provider Department Dept. Phone    10/2/2017 Mo Kwon MD [address] [place], [place] [address]tanner [phone]       Follow-up Appointments    Below is a list of your follow-up and future appointments. This may not be a complete list as you may have made appointments directly with providers that we are not aware of or your providers may have made some for you. Please call your providers to confirm appointments. It is important to keep your appointments.  Please bring your current insurance card, photo ID, co-pay, and all medication bottles to your appointment. If self-pay, payment is expected at the time of service. Future Appointments     10/2/2017 10:30 AM     Appointment with Francisco Post MD at [facility] ([phone])   [address] [address]       11/6/2017 11:30 AM     Appointment with Marli Leblanc DO at [facility] ([phone])   Please arrive 15 minutes prior to appointment, bring photo ID and insurance card. [address]  [address] [facility]         Preventive Care        Date Due    Tetanus Combination Vaccine (1 - Tdap) 4/30/1984    Diabetes Screening 4/30/2005    Colonoscopy 4/30/2015    Yearly Flu Vaccine (1) 9/1/2017    Cholesterol Screening 3/7/2022                 Care Plan Once You Return Home    This section includes instructions you will need to follow once you leave the hospital.  Your care team will discuss these with you, so you and those caring for you know how to best care for your health needs at home. This section may also include educational information about certain health topics that may be of help to you. Discharge [facility] Physician Orders and Discharge Instructions  The [place] [address] [address] [place] [address]  Telephone: [phone]    NAME:  Levar Glass  YOB: 1965  MEDICAL RECORD NUMBER:  [id number]  DATE:  10/2/2017    Wash hands with soap and water prior to and after every dressing change. Wound Cleansing:   · Do not scrub or use excessive force. · With each dressing change, rinse wounds with 0.9% Saline. (May use wound wash or soft contact solution. Both can be purchased at a local drug store). · If unable to obtain saline, may use a gentle soap and water. · Keep wounds dry in the shower unless otherwise instructed by the physician.         Topical Treatments: The information on all pages of the After Visit Summary has been reviewed with me, the patient and/or responsible adult, by my health care provider(s). I had the opportunity to ask questions regarding this information. I understand I should dispose of my armband safely at home to protect my health information. A complete copy of the After Visit Summary has been given to me, the patient and/or responsible adult.            Patient Signature/Responsible Adult:____________________    Clinician Signature:_____________________    Date:_____________________    Time:_____________________

## 2017-10-04 ENCOUNTER — HOSPITAL ENCOUNTER (OUTPATIENT)
Dept: WOUND CARE | Age: 52
Discharge: HOME | End: 2017-10-04
Attending: SPECIALIST | Admitting: SPECIALIST

## 2017-10-04 VITALS
TEMPERATURE: 98 F | HEART RATE: 64 BPM | DIASTOLIC BLOOD PRESSURE: 84 MMHG | SYSTOLIC BLOOD PRESSURE: 145 MMHG | RESPIRATION RATE: 16 BRPM

## 2017-10-04 DIAGNOSIS — I82.220 ACUTE EMBOLISM AND THROMBOSIS OF INFERIOR VENA CAVA (HCC): ICD-10-CM

## 2017-10-04 ASSESSMENT — PAIN DESCRIPTION - LOCATION: LOCATION: LEG

## 2017-10-04 ASSESSMENT — PAIN DESCRIPTION - ORIENTATION: ORIENTATION: RIGHT

## 2017-10-04 ASSESSMENT — PAIN DESCRIPTION - DESCRIPTORS: DESCRIPTORS: BURNING

## 2017-10-04 ASSESSMENT — PAIN DESCRIPTION - FREQUENCY: FREQUENCY: CONTINUOUS

## 2017-10-04 ASSESSMENT — PAIN DESCRIPTION - PAIN TYPE: TYPE: ACUTE PAIN

## 2017-10-04 ASSESSMENT — PAIN DESCRIPTION - PROGRESSION: CLINICAL_PROGRESSION: NOT CHANGED

## 2017-10-04 ASSESSMENT — PAIN SCALES - GENERAL: PAINLEVEL_OUTOF10: 5

## 2017-10-04 ASSESSMENT — PAIN DESCRIPTION - ONSET: ONSET: ON-GOING

## 2017-10-04 NOTE — IP AVS SNAPSHOT
Patient Information     Patient Name IGNACIO Melo 1965         This is your updated medication list to keep with you all times      ASK your doctor about these medications     ADVIL 200 MG Caps   Generic drug:  ibuprofen       ciprofloxacin 500 MG tablet   Commonly known as:  CIPRO   Take 1 tablet by mouth 2 times daily for 10 days       * Handicap Placard Misc   by Does not apply route Dx Lower loeg DVT chronic, bilateral. Effective 3/17/2017 until 3/17/2018. * Handicap Placard Misc   by Does not apply route Dx Lower loeg DVT chronic, bilateral. Effective 3/17/2017 until 3/17/2018. warfarin 5 MG tablet   Commonly known as:  COUMADIN   Take 1 tablet po every day. Add 1/2 tablet on , Tuesday, Thursday. * Notice: This list has 2 medication(s) that are the same as other medications prescribed for you. Read the directions carefully, and ask your doctor or other care provider to review them with you.

## 2017-10-04 NOTE — IP AVS SNAPSHOT
After Visit Summary  (Discharge Instructions)    Medication List for Home    Based on the information you provided to us as well as any changes during this visit, the following is your updated medication list.  Compare this with your prescription bottles at home. If you have any questions or concerns, contact your primary care physician's office. Daily Medication List (This medication list can be shared with any healthcare provider who is helping you manage your medications)      ASK your doctor about these medications if you have questions        Last Dose    Next Dose Due AM NOON PM NIGHT    ADVIL 200 MG Caps   Generic drug:  ibuprofen   Take 1 capsule by mouth every 4 hours as needed for Fever                                         ciprofloxacin 500 MG tablet   Commonly known as:  CIPRO   Take 1 tablet by mouth 2 times daily for 10 days                                         Handicap Placard Misc   by Does not apply route Dx Lower loeg DVT chronic, bilateral. Effective 3/17/2017 until 3/17/2018. Handicap Placard Misc   by Does not apply route Dx Lower loeg DVT chronic, bilateral. Effective 3/17/2017 until 3/17/2018. warfarin 5 MG tablet   Commonly known as:  COUMADIN   Take 1 tablet po every day. Add 1/2 tablet on Sunday, Tuesday, Thursday. Allergies as of 10/4/2017     No Known Allergies      Immunizations as of 10/4/2017     No immunizations on file. Last Vitals          Most Recent Value    Temp  98 °F (36.7 °C)    Pulse  64    Resp  16    BP  (!)  145/84         After Visit Summary    This summary was created for you. Thank you for entrusting your care to us.   The following information includes details about your hospital/visit stay along with steps you should take to help with your recovery once you leave the hospital.  In this packet, you will find information about the topics listed below:    · Instructions about your medications including a list of your home medications  · A summary of your hospital visit  · Follow-up appointments once you have left the hospital  · Your care plan at home      You may receive a survey regarding the care you received during your stay. Your input is valuable to us. We encourage you to complete and return your survey in the envelope provided. We hope you will choose us in the future for your healthcare needs. Patient Information     Patient Name Justin Traore 1965      Care Provided at:     Name Address Phone       [address] [employer] [address] [address] [phone]            Your Visit    Here you will find information about your visit, including the reason for your visit. Please take this sheet with you when you visit your doctor or other health care provider in the future. It will help determine the best possible medical care for you at that time. If you have any questions once you leave the hospital, please call the department phone number listed below. Why you were here     Your primary diagnosis was:  Not on File      Visit Information     Date & Time Provider Department Dept. Phone    10/4/2017 Wilmar Grant MD [address] [phone]       Follow-up Appointments    Below is a list of your follow-up and future appointments. This may not be a complete list as you may have made appointments directly with providers that we are not aware of or your providers may have made some for you. Please call your providers to confirm appointments. It is important to keep your appointments. Please bring your current insurance card, photo ID, co-pay, and all medication bottles to your appointment. If self-pay, payment is expected at the time of service.         Future Appointments     11/6/2017 11:30 AM

## 2017-10-05 ENCOUNTER — TELEPHONE (OUTPATIENT)
Dept: WOUND CARE | Age: 52
End: 2017-10-05

## 2017-10-05 ENCOUNTER — HOSPITAL ENCOUNTER (OUTPATIENT)
Dept: WOUND CARE | Age: 52
Discharge: HOME | End: 2017-10-05
Attending: SPECIALIST | Admitting: SPECIALIST

## 2017-10-05 VITALS
RESPIRATION RATE: 16 BRPM | DIASTOLIC BLOOD PRESSURE: 82 MMHG | SYSTOLIC BLOOD PRESSURE: 141 MMHG | TEMPERATURE: 98.2 F | HEART RATE: 64 BPM

## 2017-10-05 NOTE — PROGRESS NOTES
Multilayer Compression Wrap   (Not Unna) Below the Knee    NAME:  Adam Nice  YOB: 1965  MEDICAL RECORD NUMBER:  [id number]  DATE:  10/5/2017       [x] Removed old Multilayer wrap if indicated and wash leg with mild soap/water.  [x] Applied moisturizing agent to dry skin as needed.  [x] Applied primary and secondary dressing as ordered    [x] Applied multilayered dressing below the knee to  rle  Coralee Lazar/Profore) per [facility] instructions.  [x] Instructed patient/caregiver not to remove dressing and to keep it clean and dry.  [x] Instructed patient/caregiver on complications to report to provider, such as pain, numbness in toes, heavy drainage, and slippage of dressing.  [x] Instructed patient on purpose of compression dressing and on activity and exercise recommendations.        Applied per   Guidelines    Electronically signed by Lisbet Looney RN on 10/5/2017 at 8:50 AM

## 2017-10-05 NOTE — IP AVS SNAPSHOT
Patient Information     Patient Name IGNACIO Harris 1965      WARFARIN INFORMATION       What is the most important information you should know about warfarin? Warfarin is a medicine that you take to prevent blood clots. It is often called a blood thinner. Doctors give warfarin (such as Coumadin) to reduce the risk of blood clots. Warfarin/Coumadin Instructions:  ? It is important to take your anticoagulant medication as instructed, and notify your physician if you are unable to for any reason. ? Complete any blood tests ordered for monitoring your medication; such as PT/INR, so your physician can adjust the dose if necessary. ? Eat a consistent amount of foods with Vitamin K, such as leafy greens. ? Avoid major changes in your dietary habits, or notify your health professional before changing habits. ? Follow up with your health care provider or clinic as directed by your physician for monitoring your anticoagulant medication. ? Diet and medication can affect your anticoagulant and PT/INR blood test.   ? Do not take or discontinue any medication or over-the-counter medication except on the advice of your physician or pharmacist.   ? Anticoagulants can increase the risk of bleeding.

## 2017-10-05 NOTE — IP AVS SNAPSHOT
After Visit Summary  (Discharge Instructions)    Medication List for Home    Based on the information you provided to us as well as any changes during this visit, the following is your updated medication list.  Compare this with your prescription bottles at home. If you have any questions or concerns, contact your primary care physician's office. Daily Medication List (This medication list can be shared with any healthcare provider who is helping you manage your medications)      ASK your doctor about these medications if you have questions        Last Dose    Next Dose Due AM NOON PM NIGHT    ADVIL 200 MG Caps   Generic drug:  ibuprofen   Take 1 capsule by mouth every 4 hours as needed for Fever                                         ciprofloxacin 500 MG tablet   Commonly known as:  CIPRO   Take 1 tablet by mouth 2 times daily for 10 days                                         Handicap Placard Misc   by Does not apply route Dx Lower loeg DVT chronic, bilateral. Effective 3/17/2017 until 3/17/2018. Handicap Placard Misc   by Does not apply route Dx Lower loeg DVT chronic, bilateral. Effective 3/17/2017 until 3/17/2018. warfarin 5 MG tablet   Commonly known as:  COUMADIN   Take 1 tablet po every day. Add 1/2 tablet on Sunday, Tuesday, Thursday. Allergies as of 10/5/2017     No Known Allergies      Immunizations as of 10/5/2017     No immunizations on file. Last Vitals          Most Recent Value    Temp  98.2 °F (36.8 °C)    Pulse  64    Resp  16    BP  (!)  141/82         After Visit Summary    This summary was created for you. Thank you for entrusting your care to us.   The following information includes details about your hospital/visit stay along with steps you should take to help with your recovery once you leave the hospital.  In Apply antifungal ointment to skin surrounding the wound prior to dressing change. Apply thin film of moisture barrier ointment to skin immediately around wound. Other:      Dressings:           Wound Location:       Apply Primary Dressing to wound:        Foam/Foam with Border   Mepitel/Non-adherent/Xeroform    Alginate with Silver     Alginate    Collagen  Collagen with Silver      Hydrocolloid   Hydrafera Blue moistened with saline    MediHoney Paste/Gel  Hydrogel       Santyl with Moisten saline gauze     Bactroban/Mupirocin  Polysporin   Other:      Pack wound loosely with   Iodoform    Plain Packing   Other       Cover and Secure with:      Gauze  ABD  Stretch bandage roll  Kerlix    Coban  Ace Wrap  Cover Roll Tape     Other:    Avoid contact of tape with skin. Change dressing:  Daily     Every Other Day  Three times per week    Once a week  Do Not Change Dressing    Other:    Dressings:           Wound Location:      Apply Primary Dressing to wound:        Foam/Foam with Border   Mepitel/Non-adherent/Xeroform    Alginate with Silver     Alginate    Collagen  Collagen with Silver      Hydrocolloid   Hydrafera Blue moistened with saline    MediHoney Paste/Gel  Hydrogel       Santyl with Moisten saline gauze    Bactroban/Mupirocin  Polysporin   Other:      Pack wound loosely with   Iodoform    Plain Packing   Other       Cover and Secure with:      Gauze  ABD  Stretch bandage roll  Kerlix    Coban  Ace Wrap  Cover Roll Tape     Other:    Avoid contact of tape with skin. Change dressing:   Daily     Every Other Day  Three times per week    Once a week  Do Not Change Dressing    Other:         Negative Pressure:           Wound Location:    Pressure @ mmHg  Continuous  Intermittent    Black   White Foam  Green:            Other:   Change dressing: Three times per week    Other:     Pressure Relief:  · When sitting, shift position or do seat lifts every 15 minutes. · Turn every 2 hours when in bed. Avoid position directing pressure on wound site. · Limit side lying to 30 degree tilt. Limit HOB elevation to 30 degrees. Specialty equipment ordered:  Wheelchair cushion  Specialty Bed/Mattress       Edema Control:  Apply:  Compression Stocking Right Leg Left Leg      SpandaGrip Right Leg  Left Leg      Low compression 5-10 mm/Hg      Medium compression 10-20 mm/Hg     High compression  20-30 mm/Hg    Apply every morning immediately when getting up. They should be applied to affected leg(s) from mid foot to knee making sure to cover the heel. Remove every night before going to bed. Elevate leg(s) above the level of the heart for 30 minutes 4-5 times a day and/or when sitting. Avoid prolonged standing in one place. Compression:  Type:  ·  Multilayer Compression Wrap Applied in Clinic Right Leg Left Leg  · Do not get leg(s) with wrap wet. ·  If wraps become too tight call the center or completely remove the wrap. · Elevate leg(s) above the level of the heart when sitting. · Avoid prolonged standing in one place. [facility] remove wrap, cleanse affected leg(s) with soap and water, apply wound dressings as ordered above and reapply compression wraps on:     Off-Loading:    Off-loading when:  walking   in bed  sitting     Total non-weight bearing:   Right Leg   Left Leg      Partial weight bearing:    Right Leg   Left Leg      Assistive Device:  Walker  Crutches   Wheelchair  Roll About    Surgical shoe/Darco     Podus Boot(s)    Prevalon Boot(s)   CROW Boot     Cast/CAM Boot  Other:    Contact Cast:  ·  Total Contact Cast Applied in Clinic RightLeg Left Leg    · Do not get cast wet. · Contact center or go to emergency room if there is a foul odor or becomes uncomfortable due to feeling tight or swelling. ·  Do not use objects inside of cast to scratch.   ·  Instruction insert given and reviewed     Dietary: Important dietary reminders:  1. Increase Protein intake (i.e. Lean meats, fish, eggs, legumes, and yogurt)  2. No added salt  3. If diabetic, follow a diabetic diet and check glucose prior to meals or as instructed by your physician. Return Appointment:   Wound and dressing supply provider:    ECF or Home Healthcare:   Nurse visit:     Return Appointment: With ***  in  *** Week(s)     Ordered tests:       Consults:  Weight Management  Diabetes Education  Vascular Surgery   Endocrinology  Nutrition Counseling   Lymphedema Therapy     Your nurse  is:  ***     Electronically signed by Merlin Kang RN on 10/5/2017 at 7:36 AM     [facility] Information: Should you experience any significant changes in your wound(s) or have questions about your wound care, please contact the [address] [place] [address] at [phone] Monday and Wednesday 8:00 am - 2:00 pm and Tuesday, Thursday and Friday from 8:00 am - 4:30 pm.   If you need help with your wound outside these hours and cannot wait until we are again available, contact your PCP or go to the hospital emergency room. PLEASE NOTE: IF YOU ARE UNABLE TO OBTAIN WOUND SUPPLIES, CONTINUE TO USE THE SUPPLIES YOU HAVE AVAILABLE UNTIL YOU ARE ABLE TO REACH US. IT IS MOST IMPORTANT TO KEEP THE WOUND COVERED AT ALL TIMES. Discharge Nurse Signature:_______________________    Date: ___________ Time:  ____________    Physician orders by:      Dr Carol Quispe       Physician Signature:__________________________________        The information contained in the After Visit Summary has been reviewed with me, the patient and/or responsible adult, by my health care provider(s). I had the opportunity to ask questions regarding this information. I have elected to receive;       Patient Signature:_______________________    Date:_________Time:________

## 2017-10-05 NOTE — TELEPHONE ENCOUNTER
Patient had CT THORAX W/DYE on 9/25/2017. Prior authorization department submitted request and clinical to [facility] for review. Decision was a denial. The denial letter stated that the patient must meet one of the following:  -soft tissue swelling of the head  -a headache with confusion    If you decide to complete eer-to-peer review,  please call [phone]. Case number [id number]. Please advise how you would like to proceed. Thank you.     [address] [place] [address] Dept.  [phone]  fax [phone]

## 2017-10-06 NOTE — TELEPHONE ENCOUNTER
Appears that the imaging test was ordered by Dr. Davida Daneglo. I will re-route to Dr. Everton Agustin and cc Claudio Thrasher.

## 2017-10-09 ENCOUNTER — HOSPITAL ENCOUNTER (OUTPATIENT)
Dept: WOUND CARE | Age: 52
Discharge: HOME | End: 2017-10-09
Attending: SPECIALIST | Admitting: SPECIALIST

## 2017-10-09 VITALS
HEART RATE: 73 BPM | RESPIRATION RATE: 16 BRPM | SYSTOLIC BLOOD PRESSURE: 138 MMHG | DIASTOLIC BLOOD PRESSURE: 85 MMHG | TEMPERATURE: 98.2 F

## 2017-10-09 DIAGNOSIS — I82.423 THROMBOSIS OF BOTH ILIAC VEINS (HCC): ICD-10-CM

## 2017-10-09 DIAGNOSIS — I82.220 THROMBOSIS OF INFERIOR VENA CAVA (HCC): Primary | ICD-10-CM

## 2017-10-09 PROCEDURE — 11042 DBRDMT SUBQ TIS 1ST 20SQCM/<: CPT | Performed by: SPECIALIST

## 2017-10-09 PROCEDURE — 11045 DBRDMT SUBQ TISS EACH ADDL: CPT | Performed by: SPECIALIST

## 2017-10-09 RX ORDER — LIDOCAINE HYDROCHLORIDE 40 MG/ML
2.5 SOLUTION TOPICAL ONCE
Status: COMPLETED | OUTPATIENT
Start: 2017-10-09 | End: 2017-10-09

## 2017-10-09 RX ADMIN — LIDOCAINE HYDROCHLORIDE 2.5 ML: 40 SOLUTION TOPICAL at 10:35

## 2017-10-09 NOTE — PROGRESS NOTES
10/9/2017 10:34 AM   Drainage Description Yellow 10/9/2017 10:34 AM   Odor Mild 10/9/2017 10:34 AM   Number of days: 217       Wound 03/06/17 Leg Right; Lower;Medial #2 venous/ lymphedema (stage 3) (Active)   Wound Type Wound 10/9/2017 10:34 AM   Wound Venous 9/25/2017  8:40 AM   Dressing Status Intact 8/16/2017  9:04 AM   Dressing/Treatment Other (Comment) 10/9/2017 11:53 AM   Wound Cleansed Rinsed/Irrigated with saline 10/9/2017 10:34 AM   Wound Length (cm) 9 cm 10/9/2017 10:34 AM   Wound Width (cm) 7.4 cm 10/9/2017 10:34 AM   Wound Depth (cm)  0.1 10/9/2017 10:34 AM   Calculated Wound Size (cm^2) (l*w) 66.6 cm^2 10/9/2017 10:34 AM   Change in Wound Size % (l*w) -63.24 10/9/2017 10:34 AM   Distance Tunneling (cm) 0 cm 8/23/2017  8:57 AM   Tunneling Position ___ O'Clock 0 4/7/2017  1:15 PM   Undermining Starts ___ O'Clock 0 8/21/2017 10:38 AM   Undermining Ends___ O'Clock 0 5/30/2017  8:40 AM   Undermining Maxium Distance (cm) 0 8/23/2017  8:57 AM   Wound Assessment Red;Yellow 10/9/2017 10:34 AM   Margins Defined edges 10/9/2017 10:34 AM   Kiah-wound Assessment Purple 10/9/2017 10:34 AM   Non-staged Wound Description Full thickness 10/9/2017 10:34 AM   [place]%Wound Bed 15 8/28/2017 10:37 AM   Red%Wound Bed 80 10/9/2017 10:34 AM   Yellow%Wound Bed 20 10/9/2017 10:34 AM   Black%Wound Bed 30 10/5/2017  8:50 AM   Purple%Wound Bed 0 5/8/2017 10:35 AM   Other%Wound Bed 5 9/5/2017  8:45 AM   Drainage Amount Large 10/9/2017 10:34 AM   Drainage Description Yellow 10/9/2017 10:34 AM   Odor Mild 10/9/2017 10:34 AM   Number of days: 217       Wound 03/06/17 Leg Right; Lower; Posterior #3 venous/ lymphedema (stage 3) (Active)   Wound Type Wound 10/9/2017 10:34 AM   Wound Venous 9/25/2017  8:40 AM   Dressing Status Intact 9/15/2017  8:42 AM   Dressing/Treatment Other (Comment) 10/9/2017 11:53 AM   Wound Cleansed Rinsed/Irrigated with saline 10/9/2017 10:34 AM   Wound Length (cm) 6.2 cm 10/9/2017 10:34 AM   Wound Width (cm) 4.9 cm Profore - 4 layer wrap  ·  Multilayer Compression Wrap Applied in Clinic [x]Right Leg []Left Leg  · Do not get leg(s) with wrap wet. ·  If wraps become too tight call the center or completely remove the wrap. · Elevate leg(s) above the level of the heart when sitting. · Avoid prolonged standing in one place. [] [facility] remove wrap, cleanse affected leg(s) with soap and water, apply wound dressings as ordered above and reapply compression wraps on:     Off-Loading:   [] Off-loading when: [] walking  [] in bed [] sitting    [] Total non-weight bearing:  [] Right Leg  [] Left Leg     [] Partial weight bearing:   [] Right Leg  [] Left Leg     [x] Assistive Device: [] Walker [] Crutches  [] Wheelchair [] Roll About   [x] Surgical shoe    [] Podus Boot(s)   [] Prevalon Boot(s)  [] Miller Huh    [] Cast/CAM Boot [] Other:    Dietary:  Important dietary reminders:  1. Increase Protein intake (i.e. Lean meats, fish, eggs, legumes, and yogurt)  2. No added salt  3. If diabetic, follow a diabetic diet and check glucose prior to meals or as instructed by your physician. Return Appointment:  [] Wound and dressing supply provider:   [] ECF or Home Healthcare:  [x] Nurse visit: Wednesday and Friday of this week.     [x] Return Appointment: With Dr. Mery Monson   in  [address] [place] [address])    [] Ordered tests:       Continue to use lymphedema pumps twice daily in 1 hour increments    Your nurse  is:  Rayne Malin     Electronically signed by Sujata De Luna RN on 10/9/2017 at 11:31 AM     [address] [facility] Information: Should you experience any significant changes in your wound(s) or have questions about your wound care, please contact the [address] [place] [address] at [phone] Monday and Wednesday 8:00 am - 2:00 pm and Tuesday, Thursday and Friday from 8:00 am - 4:30 pm.   If you need help with your wound outside these hours and cannot wait until we are again available, contact your PCP or go

## 2017-10-11 ENCOUNTER — HOSPITAL ENCOUNTER (OUTPATIENT)
Dept: WOUND CARE | Age: 52
Discharge: HOME | End: 2017-10-11
Attending: SPECIALIST | Admitting: SPECIALIST

## 2017-10-11 VITALS
TEMPERATURE: 97.8 F | SYSTOLIC BLOOD PRESSURE: 137 MMHG | HEART RATE: 70 BPM | RESPIRATION RATE: 18 BRPM | DIASTOLIC BLOOD PRESSURE: 85 MMHG

## 2017-10-11 ASSESSMENT — PAIN SCALES - GENERAL: PAINLEVEL_OUTOF10: 8

## 2017-10-11 ASSESSMENT — PAIN DESCRIPTION - LOCATION: LOCATION: LEG

## 2017-10-11 ASSESSMENT — PAIN DESCRIPTION - PROGRESSION: CLINICAL_PROGRESSION: NOT CHANGED

## 2017-10-11 ASSESSMENT — PAIN DESCRIPTION - DESCRIPTORS: DESCRIPTORS: BURNING

## 2017-10-11 ASSESSMENT — PAIN DESCRIPTION - PAIN TYPE: TYPE: ACUTE PAIN

## 2017-10-11 ASSESSMENT — PAIN DESCRIPTION - ONSET: ONSET: ON-GOING

## 2017-10-11 ASSESSMENT — PAIN DESCRIPTION - ORIENTATION: ORIENTATION: RIGHT

## 2017-10-11 ASSESSMENT — PAIN DESCRIPTION - FREQUENCY: FREQUENCY: CONTINUOUS

## 2017-10-11 NOTE — PROGRESS NOTES
Multilayer Compression Wrap   (Not Unna) Below the Knee    NAME:  Flaca Michaud  YOB: 1965  MEDICAL RECORD NUMBER:  [id number]  DATE:  10/11/2017       [x] Removed old Multilayer wrap if indicated and wash leg with mild soap/water.  [x] Applied moisturizing agent to dry skin as needed.  [x] Applied primary and secondary dressing as ordered    [x] Applied multilayered dressing below the knee to  Right lower leg /Profor) per [facility] instructions.  [x] Instructed patient/caregiver not to remove dressing and to keep it clean and dry.  [x] Instructed patient/caregiver on complications to report to provider, such as pain, numbness in toes, heavy drainage, and slippage of dressing.  [x] Instructed patient on purpose of compression dressing and on activity and exercise recommendations.        Applied per   Guidelines    Electronically signed by Jesse Ortiz RN on 10/11/2017 at 9:15 AM

## 2017-10-13 ENCOUNTER — HOSPITAL ENCOUNTER (OUTPATIENT)
Dept: WOUND CARE | Age: 52
Discharge: HOME | End: 2017-10-13
Attending: SPECIALIST | Admitting: SPECIALIST

## 2017-10-13 VITALS — DIASTOLIC BLOOD PRESSURE: 80 MMHG | HEART RATE: 72 BPM | RESPIRATION RATE: 19 BRPM | SYSTOLIC BLOOD PRESSURE: 128 MMHG

## 2017-10-13 RX ORDER — CIPROFLOXACIN 500 MG/1
500 TABLET, FILM COATED ORAL 2 TIMES DAILY
COMMUNITY
End: 2017-10-16 | Stop reason: SDUPTHER

## 2017-10-13 RX ORDER — CIPROFLOXACIN 500 MG/1
500 TABLET, FILM COATED ORAL 2 TIMES DAILY
Qty: 20 TABLET | Refills: 0 | Status: SHIPPED | OUTPATIENT
Start: 2017-10-13 | End: 2017-10-23

## 2017-10-13 ASSESSMENT — PAIN DESCRIPTION - ORIENTATION: ORIENTATION: RIGHT

## 2017-10-13 ASSESSMENT — PAIN SCALES - GENERAL: PAINLEVEL_OUTOF10: 9

## 2017-10-13 ASSESSMENT — PAIN DESCRIPTION - DESCRIPTORS: DESCRIPTORS: BURNING

## 2017-10-13 ASSESSMENT — PAIN DESCRIPTION - ONSET: ONSET: ON-GOING

## 2017-10-13 ASSESSMENT — PAIN DESCRIPTION - FREQUENCY: FREQUENCY: CONTINUOUS

## 2017-10-13 ASSESSMENT — PAIN DESCRIPTION - PAIN TYPE: TYPE: ACUTE PAIN

## 2017-10-13 ASSESSMENT — PAIN DESCRIPTION - PROGRESSION: CLINICAL_PROGRESSION: NOT CHANGED

## 2017-10-13 ASSESSMENT — PAIN DESCRIPTION - LOCATION: LOCATION: LEG

## 2017-10-13 NOTE — PROGRESS NOTES
Multilayer Compression Wrap   (Not Unna) Below the Knee    NAME:  Jackie Mary Esther  YOB: 1965  MEDICAL RECORD NUMBER:  [id number]  DATE:  10/13/2017       [x] Removed old Multilayer wrap if indicated and wash leg with mild soap/water.  [x] Applied moisturizing agent to dry skin as needed.  [x] Applied primary and secondary dressing as ordered    [x] Applied multilayered dressing below the right lower leg    Profore per 's instructions.  [x] Instructed patient/caregiver not to remove dressing and to keep it clean and dry.  [x] Instructed patient/caregiver on complications to report to provider, such as pain, numbness in toes, heavy drainage, and slippage of dressing.  [x] Instructed patient on purpose of compression dressing and on activity and exercise recommendations.        Applied per   Guidelines    Electronically signed by Vicky Leigh RN on 10/13/2017 at 10:26 AM

## 2017-10-16 ENCOUNTER — HOSPITAL ENCOUNTER (OUTPATIENT)
Dept: WOUND CARE | Age: 52
Discharge: HOME | End: 2017-10-16
Attending: SPECIALIST | Admitting: SPECIALIST

## 2017-10-16 ENCOUNTER — TELEPHONE (OUTPATIENT)
Dept: WOUND CARE | Age: 52
End: 2017-10-16

## 2017-10-16 VITALS
RESPIRATION RATE: 16 BRPM | SYSTOLIC BLOOD PRESSURE: 147 MMHG | HEART RATE: 74 BPM | DIASTOLIC BLOOD PRESSURE: 85 MMHG | TEMPERATURE: 98 F

## 2017-10-16 DIAGNOSIS — I82.220 THROMBOSIS OF INFERIOR VENA CAVA (HCC): ICD-10-CM

## 2017-10-16 DIAGNOSIS — I82.423 THROMBOSIS OF BOTH ILIAC VEINS (HCC): ICD-10-CM

## 2017-10-16 DIAGNOSIS — L97.919 IDIOPATHIC CHRONIC VENOUS HYPERTENSION OF RIGHT LOWER EXTREMITY WITH ULCER (HCC): ICD-10-CM

## 2017-10-16 DIAGNOSIS — I89.0 CHRONIC ACQUIRED LYMPHEDEMA: Primary | ICD-10-CM

## 2017-10-16 DIAGNOSIS — I87.311 IDIOPATHIC CHRONIC VENOUS HYPERTENSION OF RIGHT LOWER EXTREMITY WITH ULCER (HCC): ICD-10-CM

## 2017-10-16 PROCEDURE — 11045 DBRDMT SUBQ TISS EACH ADDL: CPT | Performed by: SPECIALIST

## 2017-10-16 PROCEDURE — 11042 DBRDMT SUBQ TIS 1ST 20SQCM/<: CPT | Performed by: SPECIALIST

## 2017-10-16 RX ORDER — LIDOCAINE HYDROCHLORIDE 40 MG/ML
2.5 SOLUTION TOPICAL ONCE
Status: COMPLETED | OUTPATIENT
Start: 2017-10-16 | End: 2017-10-16

## 2017-10-16 RX ADMIN — LIDOCAINE HYDROCHLORIDE 2.5 ML: 40 SOLUTION TOPICAL at 09:10

## 2017-10-16 ASSESSMENT — PAIN DESCRIPTION - LOCATION: LOCATION: LEG

## 2017-10-16 ASSESSMENT — PAIN DESCRIPTION - FREQUENCY: FREQUENCY: CONTINUOUS

## 2017-10-16 ASSESSMENT — PAIN SCALES - GENERAL: PAINLEVEL_OUTOF10: 8

## 2017-10-16 ASSESSMENT — PAIN DESCRIPTION - DESCRIPTORS: DESCRIPTORS: BURNING

## 2017-10-16 ASSESSMENT — PAIN DESCRIPTION - ORIENTATION: ORIENTATION: RIGHT

## 2017-10-16 NOTE — PROGRESS NOTES
Procedure Note  Indications:  Based on my examination of this patient's wound(s) today, sharp excision is required to promote healing and evaluate the extent healing. Performed by: Paula Schroeder MD    Consent obtained? Yes    Time out taken: Yes    Pain Control: Anesthetic: 4% Topical Xylocaine     Debridement:Excisional Debridement    Using curette the wound was sharply debrided    down through and including the removal of  epidermis, dermis and subcutaneous tissue. Devitalized Tissue Debrided:  fibrin and slough      Pre Debridement Measurements:  Are located in the Wound Documentation Flow Sheet   Wound #: 1, 2 and 3     Post  Debridement Measurements:  Wound 03/06/17 Leg Right; Lower; Anterior #1  venous/lymphedema (stage 3) (Active)   Wound Type Wound 10/16/2017  9:10 AM   Wound Venous 10/16/2017  9:10 AM   Dressing Status Intact; New drainage 9/14/2017  8:27 AM   Dressing/Treatment Other (Comment) 10/13/2017  9:38 AM   Wound Cleansed Rinsed/Irrigated with saline 10/16/2017  9:10 AM   Wound Length (cm) 4.4 cm 10/16/2017  9:10 AM   Wound Width (cm) 12 cm 10/16/2017  9:10 AM   Wound Depth (cm)  0.2 10/16/2017  9:10 AM   Calculated Wound Size (cm^2) (l*w) 52.8 cm^2 10/16/2017  9:10 AM   Change in Wound Size % (l*w) -103.86 10/16/2017  9:10 AM   Distance Tunneling (cm) 0 cm 10/16/2017  9:10 AM   Undermining Starts ___ O'Clock 0 10/13/2017  9:38 AM   Undermining Ends___ O'Clock 0 5/30/2017  8:40 AM   Undermining Maxium Distance (cm) 0 10/16/2017  9:10 AM   Wound Assessment Black;Red;Yellow 10/16/2017  9:10 AM   Margins Defined edges 10/16/2017  9:10 AM   Kiah-wound Assessment Purple; White 10/16/2017  9:10 AM   Non-staged Wound Description Full thickness 10/11/2017  8:43 AM   [place]%Wound Bed 40 8/25/2017  2:26 PM   Red%Wound Bed 50 10/16/2017  9:10 AM   Yellow%Wound Bed 50 10/16/2017  9:10 AM   Black%Wound Bed 5 9/13/2017  8:48 AM   Purple%Wound Bed 00 5/8/2017 10:35 AM   Other%Wound Bed 10 9/6/2017  8:53 AM Drainage Amount Copious 10/16/2017  9:10 AM   Drainage Description Brown;Yellow 10/16/2017  9:10 AM   Odor Mild 10/13/2017  9:38 AM   Number of days: 223       Wound 03/06/17 Leg Right; Lower;Medial #2 venous/ lymphedema (stage 3) (Active)   Wound Type Wound 10/16/2017  9:10 AM   Wound Venous 10/16/2017  9:10 AM   Dressing Status Intact 8/16/2017  9:04 AM   Dressing/Treatment Other (Comment) 10/13/2017  9:38 AM   Wound Cleansed Rinsed/Irrigated with saline 10/16/2017  9:10 AM   Wound Length (cm) 9.4 cm 10/16/2017  9:10 AM   Wound Width (cm) 8.6 cm 10/16/2017  9:10 AM   Wound Depth (cm)  0.1 10/16/2017  9:10 AM   Calculated Wound Size (cm^2) (l*w) 80.84 cm^2 10/16/2017  9:10 AM   Change in Wound Size % (l*w) -98.14 10/16/2017  9:10 AM   Distance Tunneling (cm) 0 cm 10/16/2017  9:10 AM   Tunneling Position ___ O'Clock 0 10/13/2017  9:38 AM   Undermining Starts ___ O'Clock 0 10/13/2017  9:38 AM   Undermining Ends___ O'Clock 0 10/13/2017  9:38 AM   Undermining Maxium Distance (cm) 0 10/16/2017  9:10 AM   Wound Assessment Red;Yellow 10/16/2017  9:10 AM   Margins Defined edges 10/16/2017  9:10 AM   Kiah-wound Assessment Purple; White 10/16/2017  9:10 AM   Non-staged Wound Description Full thickness 10/16/2017  9:10 AM   [place]%Wound Bed 15 8/28/2017 10:37 AM   Red%Wound Bed 60 10/16/2017  9:10 AM   Yellow%Wound Bed 30 10/16/2017  9:10 AM   Black%Wound Bed 30 10/5/2017  8:50 AM   Purple%Wound Bed 0 5/8/2017 10:35 AM   Other%Wound Bed 10 10/16/2017  9:10 AM   Drainage Amount Copious 10/16/2017  9:10 AM   Drainage Description Brown;Yellow 10/16/2017  9:10 AM   Odor Strong 10/16/2017  9:10 AM   Number of days: 223       Wound 03/06/17 Leg Right; Lower; Posterior #3 venous/ lymphedema (stage 3) (Active)   Wound Type Wound 10/16/2017  9:10 AM   Wound Venous 10/16/2017  9:10 AM   Dressing Status Intact 9/15/2017  8:42 AM   Dressing/Treatment Other (Comment) 10/13/2017  9:38 AM   Wound Cleansed Rinsed/Irrigated with saline 10/16/2017 [facility] Physician Orders and Discharge Instructions  [facility] [address], [address]  Telephone: [phone]    NAME:  Jazmin Olea  YOB: 1965  MEDICAL RECORD NUMBER:  [id number]  DATE:  10/16/2017    Wash hands with soap and water prior to and after every dressing change. Wound Cleansing:   · Do not scrub or use excessive force. · With each dressing change, rinse wounds with 0.9% Saline. (May use wound wash or soft contact solution. Both can be purchased at a local drug store). · If unable to obtain saline, may use a gentle soap and water. · Keep wounds dry in the shower unless otherwise instructed by the physician. Topical Treatments:  Do not apply lotions, creams, or ointments to wound bed unless directed. [] Apply moisturizing lotion to skin surrounding the wound prior to dressing change.  [] Apply antifungal ointment to skin surrounding the wound prior to dressing change.  [] Apply thin film of moisture barrier ointment to skin immediately around wound. [] Other:      Dressings:           Wound Location:  Right lower leg wound     [x] Apply Primary Dressing to wound:       [] Foam/Foam with Border  [] Mepitel/Non-adherent/Xeroform   [x] Alginate with Silver    [] Alginate   [] Collagen [] Collagen with Silver     [] Hydrocolloid  [] Hydrafera Blue moistened with saline   [] MediHoney Paste/Gel [] Hydrogel      [] Santyl with Moisten saline gauze    [] Bactroban/Mupirocin [] Polysporin  [] Other:      Pack wound loosely with  [] Iodoform   [] Plain Packing  [] Other      [x] Cover and Secure with:     [] Gauze [x] ABD [] Stretch bandage roll [x] Kerlix   [] Coban [] Ace Wrap [] Cover Roll Tape    [x] Other: optilocks   Avoid contact of tape with skin.     [x] Change dressing: [] Daily    [] Every Other Day [] Three times per week   [] Once a week [x] Do Not Change Dressing   []

## 2017-10-18 ENCOUNTER — HOSPITAL ENCOUNTER (OUTPATIENT)
Dept: WOUND CARE | Age: 52
Discharge: HOME | End: 2017-10-18
Attending: SPECIALIST | Admitting: SPECIALIST

## 2017-10-18 VITALS
DIASTOLIC BLOOD PRESSURE: 80 MMHG | HEART RATE: 61 BPM | TEMPERATURE: 97.7 F | SYSTOLIC BLOOD PRESSURE: 133 MMHG | RESPIRATION RATE: 16 BRPM

## 2017-10-18 ASSESSMENT — PAIN DESCRIPTION - PAIN TYPE: TYPE: ACUTE PAIN

## 2017-10-18 ASSESSMENT — PAIN DESCRIPTION - ONSET: ONSET: ON-GOING

## 2017-10-18 ASSESSMENT — PAIN SCALES - GENERAL: PAINLEVEL_OUTOF10: 5

## 2017-10-18 ASSESSMENT — PAIN DESCRIPTION - DESCRIPTORS: DESCRIPTORS: BURNING

## 2017-10-18 ASSESSMENT — PAIN DESCRIPTION - FREQUENCY: FREQUENCY: CONTINUOUS

## 2017-10-18 ASSESSMENT — PAIN DESCRIPTION - LOCATION: LOCATION: LEG

## 2017-10-18 ASSESSMENT — PAIN DESCRIPTION - PROGRESSION: CLINICAL_PROGRESSION: NOT CHANGED

## 2017-10-18 ASSESSMENT — PAIN DESCRIPTION - ORIENTATION: ORIENTATION: RIGHT

## 2017-10-20 ENCOUNTER — HOSPITAL ENCOUNTER (OUTPATIENT)
Dept: WOUND CARE | Age: 52
Discharge: HOME | End: 2017-10-20
Attending: SPECIALIST | Admitting: SPECIALIST

## 2017-10-20 VITALS
RESPIRATION RATE: 16 BRPM | DIASTOLIC BLOOD PRESSURE: 81 MMHG | SYSTOLIC BLOOD PRESSURE: 135 MMHG | TEMPERATURE: 98 F | HEART RATE: 64 BPM

## 2017-10-20 ASSESSMENT — PAIN DESCRIPTION - ONSET: ONSET: GRADUAL

## 2017-10-20 ASSESSMENT — PAIN SCALES - GENERAL: PAINLEVEL_OUTOF10: 6

## 2017-10-20 ASSESSMENT — PAIN DESCRIPTION - FREQUENCY: FREQUENCY: CONTINUOUS

## 2017-10-20 ASSESSMENT — PAIN DESCRIPTION - ORIENTATION: ORIENTATION: RIGHT

## 2017-10-20 ASSESSMENT — PAIN DESCRIPTION - DESCRIPTORS: DESCRIPTORS: BURNING

## 2017-10-20 ASSESSMENT — PAIN DESCRIPTION - LOCATION: LOCATION: LEG

## 2017-10-20 ASSESSMENT — PAIN DESCRIPTION - PROGRESSION: CLINICAL_PROGRESSION: NOT CHANGED

## 2017-10-20 ASSESSMENT — PAIN DESCRIPTION - PAIN TYPE: TYPE: ACUTE PAIN

## 2017-10-20 NOTE — PROGRESS NOTES
Multilayer Compression Wrap   (Not Unna) Below the Knee    NAME:  Jackie Shubuta  YOB: 1965  MEDICAL RECORD NUMBER:  [id number]  DATE:  10/20/2017       [x] Removed old Multilayer wrap if indicated and wash leg with mild soap/water.  [x] Applied moisturizing agent to dry skin as needed.  [x] Applied primary and secondary dressing as ordered    [x] Applied multilayered dressing below the right lower legknee to  Profore per 's instructions.  [x] Instructed patient/caregiver not to remove dressing and to keep it clean and dry.  [x] Instructed patient/caregiver on complications to report to provider, such as pain, numbness in toes, heavy drainage, and slippage of dressing.  [x] Instructed patient on purpose of compression dressing and on activity and exercise recommendations.        Applied per   Guidelines    Electronically signed by Vicky Leigh RN on 10/20/2017 at 10:04 AM

## 2017-10-24 ENCOUNTER — HOSPITAL ENCOUNTER (OUTPATIENT)
Dept: WOUND CARE | Age: 52
Discharge: HOME | End: 2017-10-24
Attending: SURGERY | Admitting: SURGERY

## 2017-10-24 VITALS
HEART RATE: 70 BPM | RESPIRATION RATE: 16 BRPM | SYSTOLIC BLOOD PRESSURE: 142 MMHG | DIASTOLIC BLOOD PRESSURE: 80 MMHG | TEMPERATURE: 98.4 F

## 2017-10-24 ASSESSMENT — PAIN DESCRIPTION - FREQUENCY: FREQUENCY: CONTINUOUS

## 2017-10-24 ASSESSMENT — PAIN DESCRIPTION - DESCRIPTORS: DESCRIPTORS: BURNING

## 2017-10-24 ASSESSMENT — PAIN DESCRIPTION - LOCATION: LOCATION: LEG

## 2017-10-24 ASSESSMENT — PAIN DESCRIPTION - PROGRESSION: CLINICAL_PROGRESSION: NOT CHANGED

## 2017-10-24 ASSESSMENT — PAIN DESCRIPTION - ORIENTATION: ORIENTATION: RIGHT

## 2017-10-24 ASSESSMENT — PAIN DESCRIPTION - ONSET: ONSET: GRADUAL

## 2017-10-24 NOTE — PROGRESS NOTES
Multilayer Compression Wrap   (Not Unna) Below the Knee    NAME:  Carson Mahmood  YOB: 1965  MEDICAL RECORD NUMBER:  [id number]  DATE:  10/24/2017       [x] Removed old Multilayer wrap if indicated and wash leg with mild soap/water.  [x] Applied moisturizing agent to dry skin as needed.  [x] Applied primary and secondary dressing as ordered    [x] Applied multilayered dressing below the knee to  Right lower leg  Profore per 's instructions.  [x] Instructed patient/caregiver not to remove dressing and to keep it clean and dry.  [x] Instructed patient/caregiver on complications to report to provider, such as pain, numbness in toes, heavy drainage, and slippage of dressing.  [x] Instructed patient on purpose of compression dressing and on activity and exercise recommendations.        Applied per   Guidelines    Electronically signed by Mariposa Callahan RN on 10/24/2017 at 8:54 AM

## 2017-10-27 ENCOUNTER — HOSPITAL ENCOUNTER (OUTPATIENT)
Dept: WOUND CARE | Age: 52
Discharge: HOME | End: 2017-10-27
Attending: SPECIALIST | Admitting: SPECIALIST

## 2017-10-27 VITALS
SYSTOLIC BLOOD PRESSURE: 133 MMHG | DIASTOLIC BLOOD PRESSURE: 79 MMHG | TEMPERATURE: 98.3 F | HEART RATE: 75 BPM | RESPIRATION RATE: 18 BRPM

## 2017-10-27 DIAGNOSIS — I89.0 CHRONIC ACQUIRED LYMPHEDEMA: ICD-10-CM

## 2017-10-27 DIAGNOSIS — I82.220 THROMBOSIS OF INFERIOR VENA CAVA (HCC): Primary | ICD-10-CM

## 2017-10-27 DIAGNOSIS — I82.423 THROMBOSIS OF BOTH ILIAC VEINS (HCC): ICD-10-CM

## 2017-10-27 DIAGNOSIS — I87.311 IDIOPATHIC CHRONIC VENOUS HYPERTENSION OF RIGHT LOWER EXTREMITY WITH ULCER (HCC): ICD-10-CM

## 2017-10-27 DIAGNOSIS — L97.919 IDIOPATHIC CHRONIC VENOUS HYPERTENSION OF RIGHT LOWER EXTREMITY WITH ULCER (HCC): ICD-10-CM

## 2017-10-27 PROCEDURE — 11042 DBRDMT SUBQ TIS 1ST 20SQCM/<: CPT | Performed by: SPECIALIST

## 2017-10-27 PROCEDURE — 11045 DBRDMT SUBQ TISS EACH ADDL: CPT | Performed by: SPECIALIST

## 2017-10-27 RX ORDER — CIPROFLOXACIN 500 MG/1
500 TABLET, FILM COATED ORAL 2 TIMES DAILY
Qty: 20 TABLET | Refills: 0 | Status: SHIPPED | OUTPATIENT
Start: 2017-10-27 | End: 2017-11-06

## 2017-10-27 RX ORDER — CIPROFLOXACIN 500 MG/1
500 TABLET, FILM COATED ORAL 2 TIMES DAILY
COMMUNITY
End: 2017-11-10 | Stop reason: ALTCHOICE

## 2017-10-27 RX ORDER — LIDOCAINE HYDROCHLORIDE 40 MG/ML
2.5 SOLUTION TOPICAL ONCE
Status: COMPLETED | OUTPATIENT
Start: 2017-10-27 | End: 2017-10-27

## 2017-10-27 RX ADMIN — LIDOCAINE HYDROCHLORIDE 2.5 ML: 40 SOLUTION TOPICAL at 09:37

## 2017-10-27 ASSESSMENT — PAIN DESCRIPTION - LOCATION: LOCATION: LEG

## 2017-10-27 ASSESSMENT — PAIN DESCRIPTION - PAIN TYPE: TYPE: ACUTE PAIN

## 2017-10-27 ASSESSMENT — PAIN DESCRIPTION - ORIENTATION: ORIENTATION: RIGHT

## 2017-10-27 ASSESSMENT — PAIN DESCRIPTION - PROGRESSION: CLINICAL_PROGRESSION: NOT CHANGED

## 2017-10-27 ASSESSMENT — PAIN DESCRIPTION - FREQUENCY: FREQUENCY: CONTINUOUS

## 2017-10-27 ASSESSMENT — PAIN SCALES - GENERAL: PAINLEVEL_OUTOF10: 0

## 2017-10-27 ASSESSMENT — PAIN DESCRIPTION - DESCRIPTORS: DESCRIPTORS: BURNING

## 2017-10-27 ASSESSMENT — PAIN DESCRIPTION - ONSET: ONSET: GRADUAL

## 2017-10-27 NOTE — PROGRESS NOTES
Procedure Note  Indications:  Based on my examination of this patient's wound(s) today, sharp excision is required to promote healing and evaluate the extent healing. Performed by: Saeed Kuhn MD    Consent obtained? Yes    Time out taken: Yes    Pain Control: Anesthetic: 4% Topical Xylocaine     Debridement:Excisional Debridement    Using curette the wound was sharply debrided    down through and including the removal of  epidermis, dermis and subcutaneous tissue. Devitalized Tissue Debrided:  fibrin and slough      Pre Debridement Measurements:  Are located in the Wound Documentation Flow Sheet   Wound #: 1, 2 and 3     Post  Debridement Measurements:  Wound 03/06/17 Leg Right; Lower; Anterior #1  venous/lymphedema (stage 3) (Active)   Wound Type Wound 10/27/2017  9:37 AM   Wound Venous 10/27/2017  9:37 AM   Dressing Status Intact; New drainage 9/14/2017  8:27 AM   Dressing/Treatment Other (Comment) 10/27/2017  9:37 AM   Wound Cleansed Rinsed/Irrigated with saline 10/27/2017  9:37 AM   Wound Length (cm) 4.6 cm 10/27/2017  9:37 AM   Wound Width (cm) 8.1 cm 10/27/2017  9:37 AM   Wound Depth (cm)  0.2 10/27/2017  9:37 AM   Calculated Wound Size (cm^2) (l*w) 37.26 cm^2 10/27/2017  9:37 AM   Change in Wound Size % (l*w) -43.86 10/27/2017  9:37 AM   Distance Tunneling (cm) 0 cm 10/27/2017  9:37 AM   Tunneling Position ___ O'Clock 0 10/27/2017  9:37 AM   Undermining Starts ___ O'Clock 0 10/27/2017  9:37 AM   Undermining Ends___ O'Clock 0 10/27/2017  9:37 AM   Undermining Maxium Distance (cm) 0 10/18/2017  8:25 AM   Wound Assessment Black; Yellow 10/27/2017  9:37 AM   Margins Defined edges 10/27/2017  9:37 AM   Kiah-wound Assessment Pink;Red 10/27/2017  9:37 AM   Non-staged Wound Description Full thickness 10/27/2017  9:37 AM   [place]%Wound Bed 40 8/25/2017  2:26 PM   Red%Wound Bed 30 10/24/2017  8:33 AM   Yellow%Wound Bed 10 10/27/2017  9:37 AM   Black%Wound Bed 90 10/27/2017  9:37 AM   Purple%Wound Bed 00 5/8/2017 10:35 AM   Other%Wound Bed 50 10/24/2017  8:33 AM   Drainage Amount Copious 10/27/2017  9:37 AM   Drainage Description Black; Yellow;Brown 10/27/2017  9:37 AM   Odor Strong 10/27/2017  9:37 AM   Number of days: 235       Wound 03/06/17 Leg Right; Lower;Medial #2 venous/ lymphedema (stage 3) (Active)   Wound Type Wound 10/27/2017  9:37 AM   Wound Venous 10/27/2017  9:37 AM   Dressing Status Intact 8/16/2017  9:04 AM   Dressing/Treatment Other (Comment) 10/27/2017  9:37 AM   Wound Cleansed Rinsed/Irrigated with saline 10/27/2017  9:37 AM   Wound Length (cm) 9.4 cm 10/27/2017  9:37 AM   Wound Width (cm) 7.6 cm 10/27/2017  9:37 AM   Wound Depth (cm)  0.1 10/27/2017  9:37 AM   Calculated Wound Size (cm^2) (l*w) 71.44 cm^2 10/27/2017  9:37 AM   Change in Wound Size % (l*w) -75.1 10/27/2017  9:37 AM   Distance Tunneling (cm) 0 cm 10/20/2017 10:15 AM   Tunneling Position ___ O'Clock 0 10/27/2017  9:37 AM   Undermining Starts ___ O'Clock 0 10/27/2017  9:37 AM   Undermining Ends___ O'Clock 0 10/20/2017 10:15 AM   Undermining Maxium Distance (cm) 0 10/20/2017 10:15 AM   Wound Assessment Black; Yellow;Red 10/27/2017  9:37 AM   Margins Defined edges 10/27/2017  9:37 AM   Kiah-wound Assessment Pink 10/27/2017  9:37 AM   Non-staged Wound Description Full thickness 10/27/2017  9:37 AM   [place]%Wound Bed 15 8/28/2017 10:37 AM   Red%Wound Bed 50 10/27/2017  9:37 AM   Yellow%Wound Bed 40 10/27/2017  9:37 AM   Black%Wound Bed 10 10/27/2017  9:37 AM   Purple%Wound Bed 0 5/8/2017 10:35 AM   Other%Wound Bed 10 10/16/2017  9:10 AM   Drainage Amount Copious 10/27/2017  9:37 AM   Drainage Description Brown;Yellow 10/27/2017  9:37 AM   Odor Strong 10/27/2017  9:37 AM   Number of days: 235       Wound 03/06/17 Leg Right; Lower; Posterior #3 venous/ lymphedema (stage 3) (Active)   Wound Type Wound 10/27/2017  9:37 AM   Wound Venous 10/27/2017  9:37 AM   Dressing Status Intact 9/15/2017  8:42 AM   Dressing/Treatment Other (Comment) 10/27/2017  9:37 AM Three times per week   [] Once a week [] Do Not Change Dressing   [] Other:         Compression:  Type: profore- 4 layer  ·  Multilayer Compression Wrap Applied in Clinic [x]Right Leg []Left Leg  · Do not get leg(s) with wrap wet. ·  If wraps become too tight call the center or completely remove the wrap. · Elevate leg(s) above the level of the heart when sitting. · Avoid prolonged standing in one place. [] [facility] remove wrap, cleanse affected leg(s) with soap and water, apply wound dressings as ordered above and reapply compression wraps on:     Off-Loading:   [] Off-loading when: [] walking  [] in bed [] sitting    [] Total non-weight bearing:  [] Right Leg  [] Left Leg     [] Partial weight bearing:   [] Right Leg  [] Left Leg     [x] Assistive Device: [] Walker [] Crutches  [] Wheelchair [] Roll About   [x] Surgical shoe    [] Podus Boot(s)   [] Prevalon Boot(s)  [] Jarod [place]    [] Cast/CAM Boot [] Other:    Dietary:  Important dietary reminders:  1. Increase Protein intake (i.e. Lean meats, fish, eggs, legumes, and yogurt)  2. No added salt  3. If diabetic, follow a diabetic diet and check glucose prior to meals or as instructed by your physician.         Return Appointment:  [] Wound and dressing supply provider:   [] ECF or Home Healthcare:  [x] Nurse visit: next Monday oct 30th, and Wednesday Nov 1st  [x] Return Appointment: With Dr. Sukhwinder Knapp    in  [address] [place] [address])    Continue to ignacio lymphedema pumps in 1 hour increments twice a day    Your nurse  is:  Yesenia     Electronically signed by Balta Ritchie RN on 10/27/2017 at 10:17 AM     [address] [facility] Information: Should you experience any significant changes in your wound(s) or have questions about your wound care, please contact the [address] [place] [address] at [phone] Monday and Wednesday 8:00 am - 2:00 pm and Tuesday, Thursday and Friday from 8:00 am - 4:30 pm.   If you need help with your wound

## 2017-10-27 NOTE — PROGRESS NOTES
Multilayer Compression Wrap   (Not Unna) Below the Knee    NAME:  Bruna Rico  YOB: 1965  MEDICAL RECORD NUMBER:  [id number]  DATE:  10/27/2017       [x] Removed old Multilayer wrap if indicated and wash leg with mild soap/water.  [x] Applied moisturizing agent to dry skin as needed.  [x] Applied primary and secondary dressing as ordered    [x] Applied multilayered dressing below the right knee to   Profore per 's instructions.  [x] Instructed patient/caregiver not to remove dressing and to keep it clean and dry.  [x] Instructed patient/caregiver on complications to report to provider, such as pain, numbness in toes, heavy drainage, and slippage of dressing.  [x] Instructed patient on purpose of compression dressing and on activity and exercise recommendations.        Applied per   Guidelines    Electronically signed by Noe Wooten RN on 10/27/2017 at 10:55 AM

## 2017-10-30 ENCOUNTER — HOSPITAL ENCOUNTER (OUTPATIENT)
Dept: WOUND CARE | Age: 52
Discharge: HOME | End: 2017-10-30
Attending: SPECIALIST | Admitting: SPECIALIST

## 2017-10-30 VITALS — HEART RATE: 73 BPM | RESPIRATION RATE: 20 BRPM | TEMPERATURE: 98 F

## 2017-10-30 ASSESSMENT — PAIN DESCRIPTION - LOCATION: LOCATION: LEG

## 2017-10-30 ASSESSMENT — PAIN DESCRIPTION - DESCRIPTORS: DESCRIPTORS: BURNING

## 2017-10-30 ASSESSMENT — PAIN DESCRIPTION - ONSET: ONSET: GRADUAL

## 2017-10-30 ASSESSMENT — PAIN DESCRIPTION - FREQUENCY: FREQUENCY: CONTINUOUS

## 2017-10-30 ASSESSMENT — PAIN DESCRIPTION - PROGRESSION: CLINICAL_PROGRESSION: NOT CHANGED

## 2017-10-30 ASSESSMENT — PAIN DESCRIPTION - ORIENTATION: ORIENTATION: RIGHT

## 2017-10-30 ASSESSMENT — PAIN SCALES - GENERAL: PAINLEVEL_OUTOF10: 3

## 2017-11-01 ENCOUNTER — HOSPITAL ENCOUNTER (OUTPATIENT)
Dept: WOUND CARE | Age: 52
Discharge: HOME | End: 2017-11-01
Attending: SPECIALIST | Admitting: SPECIALIST

## 2017-11-01 VITALS
DIASTOLIC BLOOD PRESSURE: 80 MMHG | TEMPERATURE: 98 F | SYSTOLIC BLOOD PRESSURE: 133 MMHG | RESPIRATION RATE: 18 BRPM | HEART RATE: 81 BPM

## 2017-11-01 ASSESSMENT — PAIN DESCRIPTION - ORIENTATION: ORIENTATION: RIGHT

## 2017-11-01 ASSESSMENT — PAIN DESCRIPTION - PAIN TYPE: TYPE: ACUTE PAIN

## 2017-11-01 ASSESSMENT — PAIN DESCRIPTION - LOCATION: LOCATION: LEG

## 2017-11-01 ASSESSMENT — PAIN DESCRIPTION - DESCRIPTORS: DESCRIPTORS: BURNING

## 2017-11-01 ASSESSMENT — PAIN SCALES - GENERAL: PAINLEVEL_OUTOF10: 3

## 2017-11-01 NOTE — PROGRESS NOTES
Multilayer Compression Wrap   (Not Unna) Below the Knee    NAME:  David Holbrook  YOB: 1965  MEDICAL RECORD NUMBER:  [id number]  DATE:  11/1/2017       [x] Removed old Multilayer wrap if indicated and wash leg with mild soap/water.  [x] Applied moisturizing agent to dry skin as needed.  [x] Applied primary and secondary dressing as ordered    [x] Applied multilayered dressing below the knee to right lower leg(s). profore 4 layer wrap     [x] Instructed patient/caregiver not to remove dressing and to keep it clean and dry.  [x] Instructed patient/caregiver on complications to report to provider, such as pain, numbness in toes, heavy drainage, and slippage of dressing.  [x] Instructed patient on purpose of compression dressing and on activity and exercise recommendations.     Electronically signed by Home Bowman RN on 11/1/2017 at 8:37 AM

## 2017-11-02 ENCOUNTER — OFFICE VISIT (OUTPATIENT)
Dept: VASCULAR SURGERY | Age: 52
End: 2017-11-02

## 2017-11-02 VITALS
DIASTOLIC BLOOD PRESSURE: 80 MMHG | HEIGHT: 69 IN | WEIGHT: 247 LBS | SYSTOLIC BLOOD PRESSURE: 132 MMHG | BODY MASS INDEX: 36.58 KG/M2

## 2017-11-02 DIAGNOSIS — L97.909 VENOUS ULCER (HCC): ICD-10-CM

## 2017-11-02 DIAGNOSIS — I82.220 THROMBOSIS OF INFERIOR VENA CAVA (HCC): Primary | ICD-10-CM

## 2017-11-02 DIAGNOSIS — I83.009 VENOUS ULCER (HCC): ICD-10-CM

## 2017-11-02 DIAGNOSIS — I82.423 THROMBOSIS OF BOTH ILIAC VEINS (HCC): ICD-10-CM

## 2017-11-02 PROCEDURE — G8417 CALC BMI ABV UP PARAM F/U: HCPCS | Performed by: SURGERY

## 2017-11-02 PROCEDURE — G8428 CUR MEDS NOT DOCUMENT: HCPCS | Performed by: SURGERY

## 2017-11-02 PROCEDURE — 99204 OFFICE O/P NEW MOD 45 MIN: CPT | Performed by: SURGERY

## 2017-11-02 PROCEDURE — G8484 FLU IMMUNIZE NO ADMIN: HCPCS | Performed by: SURGERY

## 2017-11-02 PROCEDURE — 3017F COLORECTAL CA SCREEN DOC REV: CPT | Performed by: SURGERY

## 2017-11-02 PROCEDURE — 1036F TOBACCO NON-USER: CPT | Performed by: SURGERY

## 2017-11-02 PROCEDURE — G8598 ASA/ANTIPLAT THER USED: HCPCS | Performed by: SURGERY

## 2017-11-02 NOTE — PROGRESS NOTES
Subjective:      Patient ID: David Holbrook is a 46 y.o. male. 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 Uses stockings for R leg and lymphapress bilaterally. 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 Currently failing usual compression therapy. Wrap replaced 2-3 times weekly. Continues to be very active working full time. Past Medical History:   Diagnosis Date    DVT (deep venous thrombosis) (HCC)     Pain and swelling of right lower leg      No Known Allergies  Current Outpatient Prescriptions   Medication Sig Dispense Refill    ciprofloxacin (CIPRO) 500 MG tablet Take 1 tablet by mouth 2 times daily for 10 days 20 tablet 0    ciprofloxacin (CIPRO) 500 MG tablet Take 500 mg by mouth 2 times daily      warfarin (COUMADIN) 5 MG tablet Take 1 tablet po every day. Add 1/2 tablet on Sunday, Tuesday, Thursday. 30 tablet 3    ibuprofen (ADVIL) 200 MG CAPS Take 1 capsule by mouth every 4 hours as needed for Fever      Handicap Placard MISC by Does not apply route Dx Lower loeg DVT chronic, bilateral. Effective 3/17/2017 until 3/17/2018. 1 each 0    Handicap Placard MISC by Does not apply route Dx Lower loeg DVT chronic, bilateral. Effective 3/17/2017 until 3/17/2018. 1 each 0     No current facility-administered medications for this visit. Social History     Social History    Marital status:      Spouse name: N/A    Number of children: N/A    Years of education: N/A     Occupational History    Not on file.      Social History Main Topics    Smoking status: Never Smoker    Smokeless tobacco: Never Used    Alcohol use No    Drug use: No    Sexual activity: Yes     Other Topics Concern    Not on file     Social History Narrative    No narrative on file     Family History   Problem Relation Age of Onset    defect in the infrahepatic inferior vena cava extending   inferiorly into the iliac veins suggesting acute or chronic   partial thrombosis. 5. Occluded or thrombosed left renal vein. 6. Numerous venous collaterals along the abdominal wall extending   from the femoral vasculature as well as extensive retroperitoneal   collaterals and paralumbar collaterals. 7. Enlarged right common iliac and external iliac lymph nodes   suggesting a lymphoproliferative disorder or metastatic   lymphadenopathy. Consider possible biopsy or PET CT scan. CT abd/pelvis 9/11/2017  Impression   Impression:   1. 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 2. Extensive collaterals as above. 3. Question of cavernous transformation portal vein.         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 Consider referral to tertiary care center such as [facility] or [facility] for evaluation for [address] or atrial venous bypass. Will discuss with Dr. Vincent Quach. F/U prn.

## 2017-11-02 NOTE — PROGRESS NOTES
Subjective:      Patient ID: Nino Park is a 46 y.o. male.     HPI    Review of Systems    Objective:   Physical Exam    Assessment:      ***      Plan:      ***

## 2017-11-03 ENCOUNTER — HOSPITAL ENCOUNTER (OUTPATIENT)
Dept: WOUND CARE | Age: 52
Discharge: HOME | End: 2017-11-03
Attending: SPECIALIST | Admitting: SPECIALIST

## 2017-11-03 VITALS
DIASTOLIC BLOOD PRESSURE: 95 MMHG | SYSTOLIC BLOOD PRESSURE: 144 MMHG | RESPIRATION RATE: 16 BRPM | TEMPERATURE: 98.6 F | HEART RATE: 83 BPM

## 2017-11-03 DIAGNOSIS — I82.423 THROMBOSIS OF BOTH ILIAC VEINS (HCC): ICD-10-CM

## 2017-11-03 DIAGNOSIS — I89.0 CHRONIC ACQUIRED LYMPHEDEMA: ICD-10-CM

## 2017-11-03 DIAGNOSIS — I87.311 IDIOPATHIC CHRONIC VENOUS HYPERTENSION OF RIGHT LOWER EXTREMITY WITH ULCER (HCC): ICD-10-CM

## 2017-11-03 DIAGNOSIS — I82.220 THROMBOSIS OF INFERIOR VENA CAVA (HCC): Primary | ICD-10-CM

## 2017-11-03 DIAGNOSIS — L97.919 IDIOPATHIC CHRONIC VENOUS HYPERTENSION OF RIGHT LOWER EXTREMITY WITH ULCER (HCC): ICD-10-CM

## 2017-11-03 PROCEDURE — 11042 DBRDMT SUBQ TIS 1ST 20SQCM/<: CPT | Performed by: SPECIALIST

## 2017-11-03 PROCEDURE — 11045 DBRDMT SUBQ TISS EACH ADDL: CPT | Performed by: SPECIALIST

## 2017-11-03 RX ORDER — LIDOCAINE HYDROCHLORIDE 40 MG/ML
2.5 SOLUTION TOPICAL ONCE
Status: COMPLETED | OUTPATIENT
Start: 2017-11-03 | End: 2017-11-03

## 2017-11-03 RX ADMIN — LIDOCAINE HYDROCHLORIDE 2.5 ML: 40 SOLUTION TOPICAL at 08:45

## 2017-11-03 ASSESSMENT — PAIN DESCRIPTION - PAIN TYPE: TYPE: ACUTE PAIN

## 2017-11-03 ASSESSMENT — PAIN SCALES - GENERAL: PAINLEVEL_OUTOF10: 3

## 2017-11-03 ASSESSMENT — PAIN DESCRIPTION - LOCATION: LOCATION: LEG

## 2017-11-03 ASSESSMENT — PAIN DESCRIPTION - ORIENTATION: ORIENTATION: RIGHT

## 2017-11-03 NOTE — PROGRESS NOTES
Other%Wound Bed 25% brown 10/30/2017 10:01 AM   Drainage Amount Large 11/3/2017  8:27 AM   Drainage Description Brown;Yellow 11/3/2017  8:27 AM   Odor Strong 11/3/2017  8:27 AM   Number of days: 241       Wound 03/06/17 Leg Right; Lower;Medial #2 venous/ lymphedema (stage 3) (Active)   Wound Type Wound 11/3/2017  8:32 AM   Wound Venous 11/3/2017  8:32 AM   Dressing Status Intact 8/16/2017  9:04 AM   Dressing/Treatment Other (Comment) 11/3/2017  8:32 AM   Wound Cleansed Rinsed/Irrigated with saline 11/3/2017  8:32 AM   Wound Length (cm) 13 cm 11/3/2017  8:32 AM   Wound Width (cm) 7.8 cm 11/3/2017  8:32 AM   Wound Depth (cm)  0.1 11/3/2017  8:32 AM   Calculated Wound Size (cm^2) (l*w) 101.4 cm^2 11/3/2017  8:32 AM   Change in Wound Size % (l*w) -148.53 11/3/2017  8:32 AM   Distance Tunneling (cm) 0 cm 11/3/2017  8:32 AM   Tunneling Position ___ O'Clock 0 10/30/2017 10:01 AM   Undermining Starts ___ O'Clock 0 10/30/2017 10:01 AM   Undermining Ends___ O'Clock 0 10/30/2017 10:01 AM   Undermining Maxium Distance (cm) 0 11/3/2017  8:32 AM   Wound Assessment Red;Yellow 11/3/2017  8:32 AM   Margins Defined edges 11/3/2017  8:32 AM   Kiah-wound Assessment Pink 11/3/2017  8:32 AM   Non-staged Wound Description Full thickness 11/3/2017  8:32 AM   [place]%Wound Bed 15 8/28/2017 10:37 AM   Red%Wound Bed 80 11/3/2017  8:32 AM   Yellow%Wound Bed 20 11/3/2017  8:32 AM   Black%Wound Bed 20 11/1/2017  8:43 AM   Purple%Wound Bed 0 5/8/2017 10:35 AM   Other%Wound Bed 10 10/16/2017  9:10 AM   Drainage Amount Large 11/3/2017  8:32 AM   Drainage Description Brown;Yellow 11/3/2017  8:32 AM   Odor Strong 11/3/2017  8:32 AM   Number of days: 241       Wound 03/06/17 Leg Right; Lower; Posterior #3 venous/ lymphedema (stage 3) (Active)   Wound Type Wound 11/3/2017  8:32 AM   Wound Venous 11/3/2017  8:32 AM   Dressing Status Intact 9/15/2017  8:42 AM   Dressing/Treatment Other (Comment) 11/3/2017  8:32 AM   Wound Cleansed Rinsed/Irrigated with saline 11/3/2017  8:32 AM   Wound Length (cm) 7.2 cm 11/3/2017  8:32 AM   Wound Width (cm) 11 cm 11/3/2017  8:32 AM   Wound Depth (cm)  0.1 11/3/2017  8:32 AM   Calculated Wound Size (cm^2) (l*w) 79.2 cm^2 11/3/2017  8:32 AM   Change in Wound Size % (l*w) -188 11/3/2017  8:32 AM   Distance Tunneling (cm) 0 cm 11/3/2017  8:32 AM   Tunneling Position ___ O'Clock 0 10/30/2017 10:01 AM   Undermining Starts ___ O'Clock 0 10/30/2017 10:01 AM   Undermining Ends___ O'Clock 0 10/30/2017 10:01 AM   Undermining Maxium Distance (cm) 0 11/3/2017  8:32 AM   Wound Assessment Black; Red 11/3/2017  8:32 AM   Margins Defined edges 11/3/2017  8:32 AM   Kiah-wound Assessment Purple 11/3/2017  8:32 AM   Non-staged Wound Description Full thickness 11/3/2017  8:32 AM   Parachute%Wound Bed 40 11/1/2017  8:43 AM   Red%Wound Bed 30 11/3/2017  8:32 AM   Yellow%Wound Bed 25 10/20/2017 10:15 AM   Black%Wound Bed 70 11/3/2017  8:32 AM   Purple%Wound Bed 0 5/8/2017 10:35 AM   Other%Wound Bed 50 10/24/2017  8:33 AM   Drainage Amount Large 11/3/2017  8:32 AM   Drainage Description Brown;Yellow 11/3/2017  8:32 AM   Odor Strong 11/3/2017  8:32 AM   Number of days: 241       Percent of Wound Debrided: 50%    Total Surface Area Debrided:  114.4 sq cm    Non Pressure Ulcers only:  [] Limited to breakdown of the skin [x] With fat layer exposed  [] With necrosis of muscle  [] With unspecified severity  [] With necrosis of bone  [] *    Bleeding: Minimal    Hemostasis Achieved: by pressure    Procedural Pain: 0  / 10     Post Procedural Pain: 0 / 10     Response to treatment:  Well tolerated by patient. Plan:     Treatment Note please see attached Discharge Instructions Will speak to Dr. Zack Knight concerning his consult but appears patient will need to be referred to tertiary care center.     Is this Patient a candidate for HBO: No    Written Patient Dismissal Instructions Given       Patient is to return to wound care center in:  1 week(s)    Discharge [facility] Physician Orders and Discharge Instructions  Jack Natarajan [address] [place], [facility] [facility]  Telephone: [phone]    NAME:  Siva Esquivel  YOB: 1965  MEDICAL RECORD NUMBER:  [id number]  DATE:  11/3/2017    Wash hands with soap and water prior to and after every dressing change. Wound Cleansing:   · Do not scrub or use excessive force. · With each dressing change, rinse wounds with 0.9% Saline. (May use wound wash or soft contact solution. Both can be purchased at a local drug store). · If unable to obtain saline, may use a gentle soap and water. · Keep wounds dry in the shower unless otherwise instructed by the physician. Topical Treatments:  Do not apply lotions, creams, or ointments to wound bed unless directed. [] Apply moisturizing lotion to skin surrounding the wound prior to dressing change.  [] Apply antifungal ointment to skin surrounding the wound prior to dressing change.  [] Apply thin film of moisture barrier ointment to skin immediately around wound. [] Other:        Dressings:           Wound Location: right lower leg wound     [x] Apply Primary Dressing to wound:       [] Foam/Foam with Border  [] Mepitel/Non-adherent/Xeroform   [x] Alginate with Silver    [] Alginate   [] Collagen [] Collagen with Silver     [] Hydrocolloid  [] Hydrafera Blue moistened with saline   [] MediHoney Paste/Gel [] Hydrogel      [] Santyl with Moisten saline gauze   [] Bactroban/Mupirocin [] Polysporin  [] Other:      Pack wound loosely with  [] Iodoform   [] Plain Packing  [] Other      [x] Cover and Secure with:     [] Gauze [x] ABD [] Stretch bandage roll [x] Kerlix   [] Coban [] Ace Wrap [] Cover Roll Tape    [x] Other: optilock   Avoid contact of tape with skin.     [x] Change dressing:  [] Daily    [] Every Other Day [] Three times per week   [] Once a week [x] Do Not Change Dressing [] Other:         Compression:  Type: profore 4 layer wrap  ·  Multilayer Compression Wrap Applied in Clinic [x]Right Leg []Left Leg  · Do not get leg(s) with wrap wet. ·  If wraps become too tight call the center or completely remove the wrap. · Elevate leg(s) above the level of the heart when sitting. · Avoid prolonged standing in one place. [] [facility] remove wrap, cleanse affected leg(s) with soap and water, apply wound dressings as ordered above and reapply compression wraps on:     Off-Loading:   [] Off-loading when: [] walking  [] in bed [] sitting    [] Total non-weight bearing:  [] Right Leg  [] Left Leg     [] Partial weight bearing:   [] Right Leg  [] Left Leg     [x] Assistive Device: [] Walker [] Crutches  [] Wheelchair [] Roll About   [x] Surgical shoe   [] Podus Boot(s)   [] Prevalon Boot(s)  [] Jarod Maries    [] Cast/CAM Boot [] Other:      Dietary:  Important dietary reminders:  1. Increase Protein intake (i.e. Lean meats, fish, eggs, legumes, and yogurt)  2. No added salt  3. If diabetic, follow a diabetic diet and check glucose prior to meals or as instructed by your physician. Return Appointment:  [] Wound and dressing supply provider:   [] ECF or Home Healthcare:  [x] Nurse visit: Monday and Wednesdays of next week. [x] Return Appointment: With Dr. Sukhwinder Knapp  in  [address] [place] [address])    [x] Continue to use lymphedema pumps twice daily in 1 hour increments.     Your nurse  is:  Yesenia     Electronically signed by Balta Ritchie RN on 11/3/2017 at [address] [facility] Information: Should you experience any significant changes in your wound(s) or have questions about your wound care, please contact the [address] [place] [address] at [phone] Monday and Wednesday 8:00 am - 2:00 pm and Tuesday, Thursday and Friday from 8:00 am - 4:30 pm.   If you need help with your wound outside these hours and cannot wait until we are again available,

## 2017-11-03 NOTE — PROGRESS NOTES
Multilayer Compression Wrap   (Not Unna) Below the Knee    NAME:  Kasie Murray  YOB: 1965  MEDICAL RECORD NUMBER:  [id number]  DATE:  11/3/2017       [x] Removed old Multilayer wrap if indicated and wash leg with mild soap/water.  [x] Applied moisturizing agent to dry skin as needed.  [x] Applied primary and secondary dressing as ordered    [x] Applied multilayered dressing below the knee to right lower leg(s). profore 4 layer wrap   [x] Instructed patient/caregiver not to remove dressing and to keep it clean and dry.  [x] Instructed patient/caregiver on complications to report to provider, such as pain, numbness in toes, heavy drainage, and slippage of dressing.  [x] Instructed patient on purpose of compression dressing and on activity and exercise recommendations.     Electronically signed by Jose Roberto Pepper RN on 11/3/2017 at 9:02 AM

## 2017-11-06 ENCOUNTER — HOSPITAL ENCOUNTER (OUTPATIENT)
Dept: WOUND CARE | Age: 52
Discharge: HOME | End: 2017-11-06
Attending: SPECIALIST | Admitting: SPECIALIST

## 2017-11-07 NOTE — PROGRESS NOTES
Multilayer Compression Wrap   (Not Unna) Below the Knee    NAME:  Anibal Sagastume  YOB: 1965  MEDICAL RECORD NUMBER:  [id number]  DATE:  11/6/2017       [x] Removed old Multilayer wrap if indicated and wash leg with mild soap/water.  [x] Applied moisturizing agent to dry skin as needed.  [x] Applied primary and secondary dressing as ordered    [x] Applied multilayered dressing below the knee to  rle  (Profore) per [facility] instructions.  [x] Instructed patient/caregiver not to remove dressing and to keep it clean and dry.  [x] Instructed patient/caregiver on complications to report to provider, such as pain, numbness in toes, heavy drainage, and slippage of dressing.  [x] Instructed patient on purpose of compression dressing and on activity and exercise recommendations.        Applied per   Guidelines    Electronically signed by Severiano Lennon RN on 11/7/2017 at 11:15 AM

## 2017-11-08 ENCOUNTER — HOSPITAL ENCOUNTER (OUTPATIENT)
Dept: WOUND CARE | Age: 52
Discharge: HOME | End: 2017-11-08
Attending: SPECIALIST | Admitting: SPECIALIST

## 2017-11-08 NOTE — PROGRESS NOTES
Multilayer Compression Wrap   (Not Unna) Below the Knee    NAME:  Flaca Michaud  YOB: 1965  MEDICAL RECORD NUMBER:  [id number]  DATE:  11/8/2017       [x] Removed old Multilayer wrap if indicated and wash leg with mild soap/water.  [x] Applied moisturizing agent to dry skin as needed.  [x] Applied primary and secondary dressing as ordered    [x] Applied multilayered dressing below the knee to  rle  (Profore) per [facility] instructions.  [x] Instructed patient/caregiver not to remove dressing and to keep it clean and dry.  [x] Instructed patient/caregiver on complications to report to provider, such as pain, numbness in toes, heavy drainage, and slippage of dressing.  [x] Instructed patient on purpose of compression dressing and on activity and exercise recommendations.        Applied per   Guidelines    Electronically signed by Theresa Wilder RN on 11/8/2017 at 10:10 AM

## 2017-11-10 ENCOUNTER — HOSPITAL ENCOUNTER (OUTPATIENT)
Dept: WOUND CARE | Age: 52
Discharge: HOME | End: 2017-11-10
Attending: SPECIALIST | Admitting: SPECIALIST

## 2017-11-10 VITALS
HEART RATE: 66 BPM | RESPIRATION RATE: 16 BRPM | TEMPERATURE: 98 F | DIASTOLIC BLOOD PRESSURE: 84 MMHG | SYSTOLIC BLOOD PRESSURE: 146 MMHG

## 2017-11-10 DIAGNOSIS — Z79.01 CHRONIC ANTICOAGULATION: ICD-10-CM

## 2017-11-10 DIAGNOSIS — I87.311 IDIOPATHIC CHRONIC VENOUS HYPERTENSION OF RIGHT LOWER EXTREMITY WITH ULCER (HCC): Primary | ICD-10-CM

## 2017-11-10 DIAGNOSIS — I82.220 THROMBOSIS OF INFERIOR VENA CAVA (HCC): ICD-10-CM

## 2017-11-10 DIAGNOSIS — L97.919 IDIOPATHIC CHRONIC VENOUS HYPERTENSION OF RIGHT LOWER EXTREMITY WITH ULCER (HCC): Primary | ICD-10-CM

## 2017-11-10 DIAGNOSIS — I82.423 THROMBOSIS OF BOTH ILIAC VEINS (HCC): ICD-10-CM

## 2017-11-10 DIAGNOSIS — I89.0 CHRONIC ACQUIRED LYMPHEDEMA: ICD-10-CM

## 2017-11-10 DIAGNOSIS — I82.5Z3 LOWER LEG DVT (DEEP VENOUS THROMBOEMBOLISM), CHRONIC, BILATERAL (HCC): ICD-10-CM

## 2017-11-10 LAB
A/G RATIO: 1.3 (ref 1.1–2.2)
ALBUMIN SERPL-MCNC: 4 G/DL (ref 3.4–5)
ALP BLD-CCNC: 72 U/L (ref 40–129)
ALT SERPL-CCNC: 11 U/L (ref 10–40)
ANION GAP SERPL CALCULATED.3IONS-SCNC: 8 MMOL/L (ref 3–16)
AST SERPL-CCNC: 13 U/L (ref 15–37)
BILIRUB SERPL-MCNC: 0.4 MG/DL (ref 0–1)
BUN BLDV-MCNC: 20 MG/DL (ref 7–20)
CALCIUM SERPL-MCNC: 8.7 MG/DL (ref 8.3–10.6)
CHLORIDE BLD-SCNC: 103 MMOL/L (ref 99–110)
CO2: 25 MMOL/L (ref 21–32)
CREAT SERPL-MCNC: 0.8 MG/DL (ref 0.9–1.3)
GFR AFRICAN AMERICAN: >60
GFR NON-AFRICAN AMERICAN: >60
GLOBULIN: 3.1 G/DL
GLUCOSE BLD-MCNC: 84 MG/DL (ref 70–99)
INR BLD: 2 (ref 0.85–1.15)
POTASSIUM SERPL-SCNC: 4.6 MMOL/L (ref 3.5–5.1)
PROTHROMBIN TIME: 22.6 SEC (ref 9.6–13)
SODIUM BLD-SCNC: 136 MMOL/L (ref 136–145)
TOTAL PROTEIN: 7.1 G/DL (ref 6.4–8.2)

## 2017-11-10 PROCEDURE — 11045 DBRDMT SUBQ TISS EACH ADDL: CPT | Performed by: SPECIALIST

## 2017-11-10 PROCEDURE — 11042 DBRDMT SUBQ TIS 1ST 20SQCM/<: CPT | Performed by: SPECIALIST

## 2017-11-10 RX ORDER — LIDOCAINE HYDROCHLORIDE 40 MG/ML
2.5 SOLUTION TOPICAL ONCE
Status: COMPLETED | OUTPATIENT
Start: 2017-11-10 | End: 2017-11-10

## 2017-11-10 RX ADMIN — LIDOCAINE HYDROCHLORIDE 2.5 ML: 40 SOLUTION TOPICAL at 10:06

## 2017-11-10 NOTE — PROGRESS NOTES
center in:  1 week(s)    Discharge 200 [facility] Physician Orders and Discharge Instructions  The Carissa Natarajan [id number] [place] [address] [address] [facility]  Telephone: [phone]    NAME:  Shiv Mora  YOB: 1965  MEDICAL RECORD NUMBER:  [id number]  DATE:  11/10/2017    Wash hands with soap and water prior to and after every dressing change. Wound Cleansing:   · Do not scrub or use excessive force. · With each dressing change, rinse wounds with 0.9% Saline. (May use wound wash or soft contact solution. Both can be purchased at a local drug store). · If unable to obtain saline, may use a gentle soap and water. · Keep wounds dry in the shower unless otherwise instructed by the physician. Topical Treatments:  Do not apply lotions, creams, or ointments to wound bed unless directed. [] Apply moisturizing lotion to skin surrounding the wound prior to dressing change.  [] Apply antifungal ointment to skin surrounding the wound prior to dressing change.  [] Apply thin film of moisture barrier ointment to skin immediately around wound. [] Other:        Dressings:           Wound Location: right lower leg wound     [x] Apply Primary Dressing to wound:       [] Foam/Foam with Border  [] Mepitel/Non-adherent/Xeroform   [x] Alginate with Silver    [] Alginate   [] Collagen [] Collagen with Silver     [] Hydrocolloid  [] Hydrafera Blue moistened with saline   [] MediHoney Paste/Gel [] Hydrogel      [] Santyl with Moisten saline gauze   [] Bactroban/Mupirocin [] Polysporin  [] Other:      Pack wound loosely with  [] Iodoform   [] Plain Packing  [] Other      [x] Cover and Secure with:     [] Gauze [x] ABD [] Stretch bandage roll [x] Kerlix   [] Coban [] Ace Wrap [] Cover Roll Tape    [x] Other: optilock   Avoid contact of tape with skin.     [x] Change dressing:  [] Daily    [] Every Other Day [] Three times per week   [] Once a week [x] Do Not Change Dressing   [] Other:        Compression:  Type: profore 4-layer  ·  Multilayer Compression Wrap Applied in Clinic [x]Right Leg []Left Leg  · Do not get leg(s) with wrap wet. ·  If wraps become too tight call the center or completely remove the wrap. · Elevate leg(s) above the level of the heart when sitting. · Avoid prolonged standing in one place. [] [address] [facility] remove wrap, cleanse affected leg(s) with soap and water, apply wound dressings as ordered above and reapply compression wraps on:         Dietary:  Important dietary reminders:  1. Increase Protein intake (i.e. Lean meats, fish, eggs, legumes, and yogurt)  2. No added salt  3. If diabetic, follow a diabetic diet and check glucose prior to meals or as instructed by your physician. Lymphedema pumps twice a day in 1 hour increments. Return Appointment:  [] Wound and dressing supply provider:   [] ECF or Home Healthcare:  [x] Nurse visit: Monday, Wednesday, Friday   [x] Return Appointment: With Dr. Cristy Fontanez  in  [facility])    Your nurse  is:  Maria Eugenia Meehan     Electronically signed by Mónica Pfeiffer RN on 11/10/2017 at 10:31 AM     [address] [facility] Information: Should you experience any significant changes in your wound(s) or have questions about your wound care, please contact the [address] [place] [address] at [phone] Monday and Wednesday 8:00 am - 2:00 pm and Tuesday, Thursday and Friday from 8:00 am - 4:30 pm.   If you need help with your wound outside these hours and cannot wait until we are again available, contact your PCP or go to the hospital emergency room. PLEASE NOTE: IF YOU ARE UNABLE TO OBTAIN WOUND SUPPLIES, CONTINUE TO USE THE SUPPLIES YOU HAVE AVAILABLE UNTIL YOU ARE ABLE TO REACH US. IT IS MOST IMPORTANT TO KEEP THE WOUND COVERED AT ALL TIMES.       Physician orders by:     [] Dr Junito Joseph [] Dr Rahel Garcia    [] Dr Kianna Saucedo     [] Dr Nolan Carter   [x]  Nataliya Quick  [] Dr Deb Mesa       Physician Signature:__________________________________        The information contained in the After Visit Summary has been reviewed with me, the patient and/or responsible adult, by my health care provider(s). I had the opportunity to ask questions regarding this information.   I have elected to receive;      [] Patient unable to sign Discharge Instructions given to ECF/Transportation/POA      []  After Visit Summary  []  Comprehensive Discharge Instruction          Electronically signed by Gina David MD on 11/10/2017 at 11:36 AM

## 2017-11-13 ENCOUNTER — OFFICE VISIT (OUTPATIENT)
Dept: FAMILY MEDICINE CLINIC | Age: 52
End: 2017-11-13

## 2017-11-13 ENCOUNTER — TELEPHONE (OUTPATIENT)
Dept: WOUND CARE | Age: 52
End: 2017-11-13

## 2017-11-13 ENCOUNTER — HOSPITAL ENCOUNTER (OUTPATIENT)
Dept: WOUND CARE | Age: 52
Discharge: HOME | End: 2017-11-13
Attending: SPECIALIST | Admitting: SPECIALIST

## 2017-11-13 VITALS
TEMPERATURE: 97 F | SYSTOLIC BLOOD PRESSURE: 124 MMHG | DIASTOLIC BLOOD PRESSURE: 78 MMHG | WEIGHT: 243.4 LBS | BODY MASS INDEX: 35.94 KG/M2 | HEART RATE: 66 BPM | OXYGEN SATURATION: 99 %

## 2017-11-13 VITALS
RESPIRATION RATE: 16 BRPM | DIASTOLIC BLOOD PRESSURE: 85 MMHG | SYSTOLIC BLOOD PRESSURE: 148 MMHG | HEART RATE: 62 BPM | TEMPERATURE: 97.8 F

## 2017-11-13 DIAGNOSIS — R06.83 SNORING: ICD-10-CM

## 2017-11-13 DIAGNOSIS — I82.5Z3 LOWER LEG DVT (DEEP VENOUS THROMBOEMBOLISM), CHRONIC, BILATERAL (HCC): Primary | ICD-10-CM

## 2017-11-13 PROCEDURE — G8598 ASA/ANTIPLAT THER USED: HCPCS | Performed by: FAMILY MEDICINE

## 2017-11-13 PROCEDURE — G8427 DOCREV CUR MEDS BY ELIG CLIN: HCPCS | Performed by: FAMILY MEDICINE

## 2017-11-13 PROCEDURE — 1036F TOBACCO NON-USER: CPT | Performed by: FAMILY MEDICINE

## 2017-11-13 PROCEDURE — G8484 FLU IMMUNIZE NO ADMIN: HCPCS | Performed by: FAMILY MEDICINE

## 2017-11-13 PROCEDURE — 3017F COLORECTAL CA SCREEN DOC REV: CPT | Performed by: FAMILY MEDICINE

## 2017-11-13 PROCEDURE — G8417 CALC BMI ABV UP PARAM F/U: HCPCS | Performed by: FAMILY MEDICINE

## 2017-11-13 PROCEDURE — 99213 OFFICE O/P EST LOW 20 MIN: CPT | Performed by: FAMILY MEDICINE

## 2017-11-13 RX ORDER — WARFARIN SODIUM 5 MG/1
TABLET ORAL
Qty: 45 TABLET | Refills: 5 | Status: SHIPPED | OUTPATIENT
Start: 2017-11-13 | End: 2018-01-22 | Stop reason: SDUPTHER

## 2017-11-13 ASSESSMENT — PATIENT HEALTH QUESTIONNAIRE - PHQ9
SUM OF ALL RESPONSES TO PHQ QUESTIONS 1-9: 0
SUM OF ALL RESPONSES TO PHQ9 QUESTIONS 1 & 2: 0
2. FEELING DOWN, DEPRESSED OR HOPELESS: 0
1. LITTLE INTEREST OR PLEASURE IN DOING THINGS: 0

## 2017-11-13 ASSESSMENT — PAIN DESCRIPTION - ORIENTATION: ORIENTATION: RIGHT

## 2017-11-13 ASSESSMENT — PAIN DESCRIPTION - DESCRIPTORS: DESCRIPTORS: BURNING

## 2017-11-13 ASSESSMENT — PAIN DESCRIPTION - PROGRESSION: CLINICAL_PROGRESSION: NOT CHANGED

## 2017-11-13 ASSESSMENT — PAIN DESCRIPTION - ONSET: ONSET: ON-GOING

## 2017-11-13 ASSESSMENT — PAIN DESCRIPTION - FREQUENCY: FREQUENCY: CONTINUOUS

## 2017-11-13 ASSESSMENT — PAIN SCALES - GENERAL: PAINLEVEL_OUTOF10: 3

## 2017-11-13 ASSESSMENT — PAIN DESCRIPTION - PAIN TYPE: TYPE: ACUTE PAIN

## 2017-11-13 ASSESSMENT — PAIN DESCRIPTION - LOCATION: LOCATION: LEG

## 2017-11-13 NOTE — PROGRESS NOTES
Multilayer Compression Wrap   (Not Unna) Below the Knee    NAME:  Bruna Rico  YOB: 1965  MEDICAL RECORD NUMBER:  [id number]  DATE:  11/13/2017       [x] Removed old Multilayer wrap if indicated and wash leg with mild soap/water.  [x] Applied moisturizing agent to dry skin as needed.  [x] Applied primary and secondary dressing as ordered    [x] Applied multilayered dressing below the right below  knee to   Profore) per 's instructions.  [x] Instructed patient/caregiver not to remove dressing and to keep it clean and dry.  [x] Instructed patient/caregiver on complications to report to provider, such as pain, numbness in toes, heavy drainage, and slippage of dressing.  [x] Instructed patient on purpose of compression dressing and on activity and exercise recommendations.        Applied per   Guidelines    Electronically signed by Noe Wooten RN on 11/13/2017 at 11:25 AM

## 2017-11-13 NOTE — PATIENT INSTRUCTIONS
1) After having spoken to Dr. Craig Byrne, Dr. Manny Sanders recommends a sleep study to look into the oxygenation into the blood stream.  If there is not enough oxygen, this affect your circulation. Please call phone number to meet the sleep doctor, Dr. Mario Alberto Rooney. 2) Recheck INR in 3 to 4 weeks (Dec 7 and no later than Dec 13)  3) f/u in 3 months.

## 2017-11-15 ENCOUNTER — TELEPHONE (OUTPATIENT)
Dept: WOUND CARE | Age: 52
End: 2017-11-15

## 2017-11-15 ENCOUNTER — HOSPITAL ENCOUNTER (OUTPATIENT)
Dept: WOUND CARE | Age: 52
Discharge: HOME | End: 2017-11-15
Attending: SPECIALIST | Admitting: SPECIALIST

## 2017-11-15 VITALS
SYSTOLIC BLOOD PRESSURE: 166 MMHG | DIASTOLIC BLOOD PRESSURE: 96 MMHG | RESPIRATION RATE: 18 BRPM | HEART RATE: 93 BPM | TEMPERATURE: 97.6 F

## 2017-11-15 ASSESSMENT — PAIN DESCRIPTION - ORIENTATION: ORIENTATION: RIGHT

## 2017-11-15 ASSESSMENT — PAIN DESCRIPTION - ONSET: ONSET: ON-GOING

## 2017-11-15 ASSESSMENT — PAIN DESCRIPTION - FREQUENCY: FREQUENCY: CONTINUOUS

## 2017-11-15 ASSESSMENT — PAIN SCALES - GENERAL: PAINLEVEL_OUTOF10: 8

## 2017-11-15 ASSESSMENT — PAIN DESCRIPTION - PAIN TYPE: TYPE: ACUTE PAIN

## 2017-11-15 ASSESSMENT — PAIN DESCRIPTION - LOCATION: LOCATION: LEG

## 2017-11-15 ASSESSMENT — PAIN DESCRIPTION - PROGRESSION: CLINICAL_PROGRESSION: GRADUALLY WORSENING

## 2017-11-15 ASSESSMENT — PAIN DESCRIPTION - DESCRIPTORS: DESCRIPTORS: BURNING

## 2017-11-15 NOTE — PROGRESS NOTES
Multilayer Compression Wrap   (Not Unna) Below the Knee    NAME:  Leah Maurice  YOB: 1965  MEDICAL RECORD NUMBER:  [id number]  DATE:  11/15/2017       [x] Removed old Multilayer wrap if indicated and wash leg with mild soap/water.  [x] Applied moisturizing agent to dry skin as needed.  [x] Applied primary and secondary dressing as ordered    [x] Applied multilayered dressing below the knee to  Right lower leg Profore per 's instructions.  [x] Instructed patient/caregiver not to remove dressing and to keep it clean and dry.  [x] Instructed patient/caregiver on complications to report to provider, such as pain, numbness in toes, heavy drainage, and slippage of dressing.  [x] Instructed patient on purpose of compression dressing and on activity and exercise recommendations.        Applied per   Guidelines    Electronically signed by Ray Simental RN on 11/15/2017 at 9:09 AM

## 2017-11-17 ENCOUNTER — HOSPITAL ENCOUNTER (OUTPATIENT)
Dept: WOUND CARE | Age: 52
Discharge: HOME | End: 2017-11-17
Attending: SPECIALIST | Admitting: SPECIALIST

## 2017-11-17 VITALS — SYSTOLIC BLOOD PRESSURE: 145 MMHG | TEMPERATURE: 97.6 F | DIASTOLIC BLOOD PRESSURE: 87 MMHG | RESPIRATION RATE: 16 BRPM

## 2017-11-17 DIAGNOSIS — I87.311 IDIOPATHIC CHRONIC VENOUS HYPERTENSION OF RIGHT LOWER EXTREMITY WITH ULCER (HCC): ICD-10-CM

## 2017-11-17 DIAGNOSIS — L97.919 IDIOPATHIC CHRONIC VENOUS HYPERTENSION OF RIGHT LOWER EXTREMITY WITH ULCER (HCC): ICD-10-CM

## 2017-11-17 DIAGNOSIS — I82.423 THROMBOSIS OF BOTH ILIAC VEINS (HCC): ICD-10-CM

## 2017-11-17 DIAGNOSIS — I89.0 CHRONIC ACQUIRED LYMPHEDEMA: Primary | ICD-10-CM

## 2017-11-17 DIAGNOSIS — Z79.01 CHRONIC ANTICOAGULATION: ICD-10-CM

## 2017-11-17 DIAGNOSIS — I82.220 THROMBOSIS OF INFERIOR VENA CAVA (HCC): ICD-10-CM

## 2017-11-17 PROCEDURE — 11042 DBRDMT SUBQ TIS 1ST 20SQCM/<: CPT | Performed by: SPECIALIST

## 2017-11-17 PROCEDURE — 11045 DBRDMT SUBQ TISS EACH ADDL: CPT | Performed by: SPECIALIST

## 2017-11-17 RX ORDER — LIDOCAINE HYDROCHLORIDE 40 MG/ML
2.5 SOLUTION TOPICAL ONCE
Status: COMPLETED | OUTPATIENT
Start: 2017-11-17 | End: 2017-11-17

## 2017-11-17 RX ADMIN — LIDOCAINE HYDROCHLORIDE 2.5 ML: 40 SOLUTION TOPICAL at 10:00

## 2017-11-17 NOTE — PROGRESS NOTES
Multilayer Compression Wrap   (Not Unna) Below the Knee    NAME:  Gladystine Duane  YOB: 1965  MEDICAL RECORD NUMBER:  [id number]  DATE:  11/17/2017       [x] Removed old Multilayer wrap if indicated and wash leg with mild soap/water.  [x] Applied moisturizing agent to dry skin as needed.  [x] Applied primary and secondary dressing as ordered    [x] Applied multilayered dressing below the knee to rle Coralyn Jon/Profchapo) per [facility] [employer] instructions.  [x] Instructed patient/caregiver not to remove dressing and to keep it clean and dry.  [x] Instructed patient/caregiver on complications to report to provider, such as pain, numbness in toes, heavy drainage, and slippage of dressing.  [x] Instructed patient on purpose of compression dressing and on activity and exercise recommendations.        Applied per   Guidelines    Electronically signed by Donald Thomas RN on 11/17/2017 at 10:37 AM

## 2017-11-17 NOTE — PROGRESS NOTES
11/17/2017  9:45 AM   Wound Cleansed Rinsed/Irrigated with saline 11/17/2017  9:45 AM   Wound Length (cm) 7.2 cm 11/17/2017  9:45 AM   Wound Width (cm) 7.9 cm 11/17/2017  9:45 AM   Wound Depth (cm)  0.1 11/17/2017  9:45 AM   Calculated Wound Size (cm^2) (l*w) 56.88 cm^2 11/17/2017  9:45 AM   Change in Wound Size % (l*w) -106.84 11/17/2017  9:45 AM   Distance Tunneling (cm) 0 cm 11/6/2017  8:21 AM   Tunneling Position ___ O'Clock 0 10/30/2017 10:01 AM   Undermining Starts ___ O'Clock 0 10/30/2017 10:01 AM   Undermining Ends___ O'Clock 0 10/30/2017 10:01 AM   Undermining Maxium Distance (cm) 0 11/6/2017  8:21 AM   Wound Assessment Black;Red;Yellow 11/17/2017  9:45 AM   Drainage Amount Copious 11/17/2017  9:45 AM   Drainage Description Brown 11/17/2017  9:45 AM   Odor Strong 11/17/2017  9:45 AM   Margins Defined edges 11/17/2017  9:45 AM   Kiah-wound Assessment Purple 11/17/2017  9:45 AM   Non-staged Wound Description Full thickness 11/17/2017  9:45 AM   [place]%Wound Bed 40 11/10/2017 10:05 AM   Red%Wound Bed 40 11/17/2017  9:45 AM   Yellow%Wound Bed 40 11/17/2017  9:45 AM   Black%Wound Bed 20 11/17/2017  9:45 AM   Purple%Wound Bed 0 5/8/2017 10:35 AM   Other%Wound Bed 50 10/24/2017  8:33 AM   Number of days: 256       Percent of Wound Debrided: 50%    Total Surface Area Debrided:  132 sq cm    Non Pressure Ulcers only:  [] Limited to breakdown of the skin [x] With fat layer exposed  [] With necrosis of muscle  [] With unspecified severity  [] With necrosis of bone  []        Bleeding: Minimal    Hemostasis Achieved: by pressure    Procedural Pain: 0  / 10     Post Procedural Pain: 0 / 10     Response to treatment:  Well tolerated by patient. I have spoken to surgeons at the [facility] who agree to see Mr. Jp Moore in consultation. HE is aware that his appointment is on November 29th. He will be given a copy of all his studies to take with him.          Plan:     Treatment Note please see attached Discharge Instructions    Is this Patient a candidate for HBO: No    Written Patient Dismissal Instructions Given       Patient is to return to wound care center in:  1 week(s)    Discharge [facility] Physician Orders and Discharge Instructions  [facility]  [address], [address]  Telephone: [phone]    NAME:  Sanjiv Celeste  YOB: 1965  MEDICAL RECORD NUMBER:  [id number]  DATE:  11/17/2017    Wash hands with soap and water prior to and after every dressing change. Wound Cleansing:   · Do not scrub or use excessive force. · With each dressing change, rinse wounds with 0.9% Saline. (May use wound wash or soft contact solution. Both can be purchased at a local drug store). · If unable to obtain saline, may use a gentle soap and water. · Keep wounds dry in the shower unless otherwise instructed by the physician. Topical Treatments:  Do not apply lotions, creams, or ointments to wound bed unless directed. [] Apply moisturizing lotion to skin surrounding the wound prior to dressing change.  [] Apply antifungal ointment to skin surrounding the wound prior to dressing change.  [] Apply thin film of moisture barrier ointment to skin immediately around wound. [x] Other: Clean wounds with vashe wash and let soak on wounds to right lower leg for 5 minutes.        Dressings:           Wound Location: right lower leg wound     [x] Apply Primary Dressing to wound:       [] Foam/Foam with Border  [] Mepitel/Non-adherent/Xeroform   [] Alginate with Silver    [] Alginate   [] Collagen [] Collagen with Silver     [] Hydrocolloid  [] Hydrafera Blue moistened with saline   [] MediHoney Paste/Gel [] Hydrogel      [] Santyl with Moisten saline gauze   [] Bactroban/Mupirocin [] Polysporin  [x] Other: cutimed sorbact     Pack wound loosely with  [] Iodoform   [] Plain Packing  [] Other      [x] Cover and Secure with: [] Gauze [x] ABD [] Stretch bandage roll [x] Kerlix   [] Coban [] Ace Wrap [] Cover Roll Tape    [x] Other: optilock   Avoid contact of tape with skin. [x] Change dressing:  [] Daily    [] Every Other Day [] Three times per week   [] Once a week [] Do Not Change Dressing   [] Othe            Compression:  Type: profore 4 layer wrap  ·  Multilayer Compression Wrap Applied in Clinic [x]Right Leg []Left Leg  · Do not get leg(s) with wrap wet. ·  If wraps become too tight call the center or completely remove the wrap. · Elevate leg(s) above the level of the heart when sitting. · Avoid prolonged standing in one place. [] [facility] remove wrap, cleanse affected leg(s) with soap and water, apply wound dressings as ordered above and reapply compression wraps on:     Dietary:  Important dietary reminders:  1. Increase Protein intake (i.e. Lean meats, fish, eggs, legumes, and yogurt)  2. No added salt  3. If diabetic, follow a diabetic diet and check glucose prior to meals or as instructed by your physician. Return Appointment:  [] Wound and dressing supply provider:   [] ECF or Home Healthcare:  [x] Nurse visit:  Next Monday. [x] Return Appointment: With Dr. Bruna Pedraza  Next Wednesday    [x] Patient to go to [facility] on 11/29/17 at 1200. Your nurse  is:  Yesenia     Electronically signed by Yusra Barron RN on 11/17/2017 at 10:21 AM     [address] [facility] Information: Should you experience any significant changes in your wound(s) or have questions about your wound care, please contact the [address] [place] [address] at [phone] Monday and Wednesday 8:00 am - 2:00 pm and Tuesday, Thursday and Friday from 8:00 am - 4:30 pm.   If you need help with your wound outside these hours and cannot wait until we are again available, contact your PCP or go to the hospital emergency room.      PLEASE NOTE: IF YOU ARE UNABLE TO OBTAIN WOUND SUPPLIES, CONTINUE TO

## 2017-11-19 NOTE — PROGRESS NOTES
lb (112 kg)   08/04/17 239 lb (108.4 kg)     BP Readings from Last 3 Encounters:   11/17/17 (!) 145/87   11/15/17 (!) 166/96   11/13/17 (!) 148/85       Patient Active Problem List   Diagnosis    Pain and swelling of right lower leg    Chronic acquired lymphedema    Idiopathic chronic venous hypertension of right lower extremity with ulcer (HCC)    Lower leg DVT (deep venous thromboembolism), chronic (HCC)    Chronic anticoagulation    Thrombosis of iliac artery (HCC)    Thrombosis of both iliac veins (HCC)    Thrombosis of inferior vena cava (HCC)           There is no immunization history on file for this patient. Past Medical History:   Diagnosis Date    DVT (deep venous thrombosis) (HCC)     Pain and swelling of right lower leg      No past surgical history on file. Family History   Problem Relation Age of Onset    Diabetes Mother     Heart Attack Father      Social History     Social History    Marital status:      Spouse name: N/A    Number of children: N/A    Years of education: N/A     Occupational History    Not on file. Social History Main Topics    Smoking status: Never Smoker    Smokeless tobacco: Never Used    Alcohol use No    Drug use: No    Sexual activity: Yes     Other Topics Concern    Not on file     Social History Narrative    No narrative on file       O: /78   Pulse 66   Temp 97 °F (36.1 °C) (Oral)   Wt 243 lb 6.4 oz (110.4 kg)   SpO2 99%   BMI 35.94 kg/m²   Physical Exam  GEN: No acute distress, cooperative, well nourished, alert. HEENT: PEERLA, EOMI , normocephalic/atraumatic, nares and oropharynx clear. Mucus membranes normal, Tympanic membranes clear bilaterally. Neck: soft, supple, no thyromegaly, mass, no Lymphadenopathy  CV: Regular rate and rhythm, no murmur, rubs, gallops. No edema. Resp: Clear to auscultation bilaterally good air entry bilaterally  No crackles, wheeze. Breathing comfortably. Psych: normal affect.   Neuro:

## 2017-11-20 ENCOUNTER — HOSPITAL ENCOUNTER (OUTPATIENT)
Dept: WOUND CARE | Age: 52
Discharge: HOME | End: 2017-11-20
Attending: SPECIALIST | Admitting: SPECIALIST

## 2017-11-20 VITALS
SYSTOLIC BLOOD PRESSURE: 132 MMHG | DIASTOLIC BLOOD PRESSURE: 87 MMHG | TEMPERATURE: 98 F | HEART RATE: 69 BPM | RESPIRATION RATE: 20 BRPM

## 2017-11-20 RX ORDER — CIPROFLOXACIN 500 MG/1
500 TABLET, FILM COATED ORAL 2 TIMES DAILY
Qty: 20 TABLET | Refills: 0 | Status: SHIPPED | OUTPATIENT
Start: 2017-11-20 | End: 2017-11-30

## 2017-11-20 RX ORDER — CIPROFLOXACIN 500 MG/1
500 TABLET, FILM COATED ORAL 2 TIMES DAILY
COMMUNITY
End: 2017-12-06 | Stop reason: ALTCHOICE

## 2017-11-20 ASSESSMENT — PAIN DESCRIPTION - DESCRIPTORS: DESCRIPTORS: BURNING

## 2017-11-20 ASSESSMENT — PAIN DESCRIPTION - ORIENTATION: ORIENTATION: RIGHT

## 2017-11-20 ASSESSMENT — PAIN DESCRIPTION - PROGRESSION: CLINICAL_PROGRESSION: GRADUALLY WORSENING

## 2017-11-20 ASSESSMENT — PAIN DESCRIPTION - ONSET: ONSET: ON-GOING

## 2017-11-20 ASSESSMENT — PAIN SCALES - GENERAL: PAINLEVEL_OUTOF10: 8

## 2017-11-20 ASSESSMENT — PAIN DESCRIPTION - PAIN TYPE: TYPE: ACUTE PAIN

## 2017-11-20 ASSESSMENT — PAIN DESCRIPTION - LOCATION: LOCATION: LEG

## 2017-11-20 ASSESSMENT — PAIN DESCRIPTION - FREQUENCY: FREQUENCY: CONTINUOUS

## 2017-11-22 ENCOUNTER — HOSPITAL ENCOUNTER (OUTPATIENT)
Dept: WOUND CARE | Age: 52
Discharge: HOME | End: 2017-11-22
Attending: SPECIALIST | Admitting: SPECIALIST

## 2017-11-22 VITALS
TEMPERATURE: 98.3 F | SYSTOLIC BLOOD PRESSURE: 148 MMHG | HEART RATE: 72 BPM | DIASTOLIC BLOOD PRESSURE: 83 MMHG | RESPIRATION RATE: 18 BRPM

## 2017-11-22 DIAGNOSIS — I89.0 CHRONIC ACQUIRED LYMPHEDEMA: Primary | ICD-10-CM

## 2017-11-22 DIAGNOSIS — L97.919 IDIOPATHIC CHRONIC VENOUS HYPERTENSION OF RIGHT LOWER EXTREMITY WITH ULCER (HCC): ICD-10-CM

## 2017-11-22 DIAGNOSIS — I82.423 THROMBOSIS OF BOTH ILIAC VEINS (HCC): ICD-10-CM

## 2017-11-22 DIAGNOSIS — I87.311 IDIOPATHIC CHRONIC VENOUS HYPERTENSION OF RIGHT LOWER EXTREMITY WITH ULCER (HCC): ICD-10-CM

## 2017-11-22 DIAGNOSIS — I82.220 THROMBOSIS OF INFERIOR VENA CAVA (HCC): ICD-10-CM

## 2017-11-22 PROCEDURE — 11045 DBRDMT SUBQ TISS EACH ADDL: CPT | Performed by: SPECIALIST

## 2017-11-22 PROCEDURE — 11042 DBRDMT SUBQ TIS 1ST 20SQCM/<: CPT | Performed by: SPECIALIST

## 2017-11-22 RX ORDER — LIDOCAINE HYDROCHLORIDE 40 MG/ML
2.5 SOLUTION TOPICAL ONCE
Status: COMPLETED | OUTPATIENT
Start: 2017-11-22 | End: 2017-11-22

## 2017-11-22 RX ADMIN — LIDOCAINE HYDROCHLORIDE 2.5 ML: 40 SOLUTION TOPICAL at 08:49

## 2017-11-22 NOTE — PROGRESS NOTES
Multilayer Compression Wrap   (Not Unna) Below the Knee    NAME:  Earline Claude  YOB: 1965  MEDICAL RECORD NUMBER:  [id number]  DATE:  11/22/2017       [x] Removed old Multilayer wrap if indicated and wash leg with mild soap/water.  [x] Applied moisturizing agent to dry skin as needed.  [x] Applied primary and secondary dressing as ordered    [x] Applied multilayered dressing below the knee to right lower leg(s). profore- 4 layer wrap   [x] Instructed patient/caregiver not to remove dressing and to keep it clean and dry.  [x] Instructed patient/caregiver on complications to report to provider, such as pain, numbness in toes, heavy drainage, and slippage of dressing.  [x] Instructed patient on purpose of compression dressing and on activity and exercise recommendations.     Electronically signed by Kwaku Campos RN on 11/22/2017 at 9:21 AM

## 2017-11-22 NOTE — PROGRESS NOTES
8:35 AM   Red%Wound Bed 75 11/22/2017  8:48 AM   Yellow%Wound Bed 20 11/22/2017  8:48 AM   Black%Wound Bed 5 11/22/2017  8:48 AM   Purple%Wound Bed 00 5/8/2017 10:35 AM   Other%Wound Bed 25% brown 10/30/2017 10:01 AM   Number of days: 260       Wound 03/06/17 Leg Right; Lower;Medial #2 venous/ lymphedema (stage 3) (Active)   Wound Type Wound 11/22/2017  8:48 AM   Wound Venous 11/20/2017 11:12 AM   Dressing Status Intact 8/16/2017  9:04 AM   Dressing/Treatment Other (Comment) 11/22/2017  8:48 AM   Wound Cleansed Rinsed/Irrigated with saline 11/22/2017  8:48 AM   Wound Length (cm) 13.5 cm 11/22/2017  8:48 AM   Wound Width (cm) 9.5 cm 11/22/2017  8:48 AM   Wound Depth (cm)  0.1 11/22/2017  8:48 AM   Calculated Wound Size (cm^2) (l*w) 128.25 cm^2 11/22/2017  8:48 AM   Change in Wound Size % (l*w) -214.34 11/22/2017  8:48 AM   Distance Tunneling (cm) 0 cm 11/22/2017  8:48 AM   Tunneling Position ___ O'Clock 0 10/30/2017 10:01 AM   Undermining Starts ___ O'Clock 0 10/30/2017 10:01 AM   Undermining Ends___ O'Clock 0 10/30/2017 10:01 AM   Undermining Maxium Distance (cm) 0 11/22/2017  8:48 AM   Wound Assessment Red;Yellow;Black 11/22/2017  8:48 AM   Drainage Amount Large 11/22/2017  8:48 AM   Drainage Description Brown;Yellow 11/22/2017  8:48 AM   Odor Strong 11/22/2017  8:48 AM   Margins Defined edges 11/22/2017  8:48 AM   Kiah-wound Assessment Pink 11/22/2017  8:48 AM   Non-staged Wound Description Full thickness 11/22/2017  8:48 AM   [place]%Wound Bed 15 8/28/2017 10:37 AM   Red%Wound Bed 50 11/22/2017  8:48 AM   Yellow%Wound Bed 25 11/22/2017  8:48 AM   Black%Wound Bed 25 11/22/2017  8:48 AM   Purple%Wound Bed 0 5/8/2017 10:35 AM   Other%Wound Bed 10 10/16/2017  9:10 AM   Number of days: 260       Wound 03/06/17 Leg Right; Lower; Posterior #3 venous/ lymphedema (stage 3) (Active)   Wound Type Wound 11/22/2017  8:48 AM   Wound Venous 11/20/2017 11:12 AM   Dressing Status Intact 9/15/2017  8:42 AM   Dressing/Treatment Other (Comment) 11/22/2017  8:48 AM   Wound Cleansed Rinsed/Irrigated with saline 11/22/2017  8:48 AM   Wound Length (cm) 9 cm 11/22/2017  8:48 AM   Wound Width (cm) 11.5 cm 11/22/2017  8:48 AM   Wound Depth (cm)  0.1 11/22/2017  8:48 AM   Calculated Wound Size (cm^2) (l*w) 103.5 cm^2 11/22/2017  8:48 AM   Change in Wound Size % (l*w) -276.36 11/22/2017  8:48 AM   Distance Tunneling (cm) 0 cm 11/22/2017  8:48 AM   Tunneling Position ___ O'Clock 0 11/22/2017  8:48 AM   Undermining Starts ___ O'Clock 0 11/22/2017  8:48 AM   Undermining Ends___ O'Clock 0 11/22/2017  8:48 AM   Undermining Maxium Distance (cm) 0 11/22/2017  8:48 AM   Wound Assessment Black;Red;Yellow 11/22/2017  8:48 AM   Drainage Amount Copious 11/22/2017  8:48 AM   Drainage Description Epi Harsh 11/22/2017  8:48 AM   Odor Strong 11/22/2017  8:48 AM   Margins Defined edges 11/22/2017  8:48 AM   Kiah-wound Assessment Purple 11/22/2017  8:48 AM   Non-staged Wound Description Full thickness 11/22/2017  8:48 AM   [place]%Wound Bed 40 11/10/2017 10:05 AM   Red%Wound Bed 40 11/22/2017  8:48 AM   Yellow%Wound Bed 30 11/22/2017  8:48 AM   Black%Wound Bed 30 11/22/2017  8:48 AM   Purple%Wound Bed 0 5/8/2017 10:35 AM   Other%Wound Bed 50 10/24/2017  8:33 AM   Number of days: 260       Percent of Wound Debrided: 50%    Total Surface Area Debrided:  143 sq cm    Non Pressure Ulcers only:  [] Limited to breakdown of the skin [x] With fat layer exposed  [] With necrosis of muscle  [] With unspecified severity  [] With necrosis of bone  []       Bleeding: Minimal    Hemostasis Achieved: by pressure    Procedural Pain: 1  / 10     Post Procedural Pain: 0 / 10     Response to treatment:  Well tolerated by patient. Patient to be seen at the [facility] in one week.         Plan:     Treatment Note please see attached Discharge Instructions    Is this Patient a candidate for HBO: No    Written Patient Dismissal Instructions Given       Patient is to return to wound care center in: 1 week(s)    Discharge [facility] Physician Orders and Discharge Instructions  The [facility] [address], [address]  Telephone: [phone]    NAME:  Bruna Rico  YOB: 1965  MEDICAL RECORD NUMBER:  [id number]  DATE:  11/22/2017    Wash hands with soap and water prior to and after every dressing change. Wound Cleansing:   · Do not scrub or use excessive force. · With each dressing change, rinse wounds with 0.9% Saline. (May use wound wash or soft contact solution. Both can be purchased at a local drug store). · If unable to obtain saline, may use a gentle soap and water. · Keep wounds dry in the shower unless otherwise instructed by the physician. Topical Treatments:  Do not apply lotions, creams, or ointments to wound bed unless directed. [] Apply moisturizing lotion to skin surrounding the wound prior to dressing change.  [] Apply antifungal ointment to skin surrounding the wound prior to dressing change.  [] Apply thin film of moisture barrier ointment to skin immediately around wound. [] Other:       Dressings:           Wound Location: right lower leg wound     [x] Apply Primary Dressing to wound:       [] Foam/Foam with Border  [] Mepitel/Non-adherent/Xeroform   [] Alginate with Silver    [] Alginate   [] Collagen [] Collagen with Silver     [] Hydrocolloid  [] Hydrafera Blue moistened with saline   [] MediHoney Paste/Gel [] Hydrogel      [] Santyl with Moisten saline gauze   [] Bactroban/Mupirocin [] Polysporin  [x] Other: cutimed sorbact, cover with optilock, abd, kerlix, profore     Pack wound loosely with  [] Iodoform   [] Plain Packing  [] Other      [x] Cover and Secure with:     [] Gauze [] ABD [] Stretch bandage roll [] Kerlix   [] Coban [] Ace Wrap [] Cover Roll Tape    [] Other:    Avoid contact of tape with skin.     [x] Change dressing:  [] Daily    [] Every Other Day [] Three times per week   [] Once a week [x] Do Not Change Dressing- if excessive drainage may change over the weekend and wrap with ace bandage   [] Other:       Compression:  Type: profore  ·  Multilayer Compression Wrap Applied in Clinic [x]Right Leg []Left Leg  · Do not get leg(s) with wrap wet. ·  If wraps become too tight call the center or completely remove the wrap. · Elevate leg(s) above the level of the heart when sitting. · Avoid prolonged standing in one place. [] [facility] remove wrap, cleanse affected leg(s) with soap and water, apply wound dressings as ordered above and reapply compression wraps on:     Off-Loading:   [] Off-loading when: [] walking  [] in bed [] sitting    [] Total non-weight bearing:  [] Right Leg  [] Left Leg     [] Partial weight bearing:   [] Right Leg  [] Left Leg     [x] Assistive Device: [] Walker [] Crutches  [] Wheelchair [] Roll About   [x] Surgical shoe    [] Podus Boot(s)   [] Prevalon Boot(s)  [] Lenice Iaeger    [] Cast/CAM Boot [] Other:      Dietary:  Important dietary reminders:  1. Increase Protein intake (i.e. Lean meats, fish, eggs, legumes, and yogurt)  2. No added salt  3. If diabetic, follow a diabetic diet and check glucose prior to meals or as instructed by your physician.         Return Appointment:  [] Wound and dressing supply provider:   [] ECF or Home Healthcare:  [x] Nurse visit: next Monday 11/27/17   [x] Return Appointment: With Dr. Gena Nayak  Next Friday    Scheduled for [facility] on 11/29/17 at 12pm with Dr. Heide Sánchez to use lymphedema pumps twice daily in 1 hour increments    Your nurse  is:  Teja Yepez     Electronically signed by Ana Dill RN on 11/22/2017 at 9:16 AM     [address] Information: Should you experience any significant changes in your wound(s) or have questions about your wound care, please contact the [address] [place] [address] at [phone] Monday and Wednesday 8:00 am - 2:00 pm and Tuesday, Thursday and Friday from 8:00 am - 4:30 pm.   If you need help with your wound outside these hours and cannot wait until we are again available, contact your PCP or go to the hospital emergency room. PLEASE NOTE: IF YOU ARE UNABLE TO OBTAIN WOUND SUPPLIES, CONTINUE TO USE THE SUPPLIES YOU HAVE AVAILABLE UNTIL YOU ARE ABLE TO REACH US. IT IS MOST IMPORTANT TO KEEP THE WOUND COVERED AT ALL TIMES. Physician orders by:     [] Dr Jackie Tirado [] Dr Grayson Carbajal    [] Dr Carl Borrego     [] Dr Noy Blanton   [x] Dr Sahra Deluna  [] Dr Froy Ayers       Physician Signature:__________________________________        The information contained in the After Visit Summary has been reviewed with me, the patient and/or responsible adult, by my health care provider(s). I had the opportunity to ask questions regarding this information.   I have elected to receive;      [] Patient unable to sign Discharge Instructions given to ECF/Transportation/POA      []  After Visit Summary  []  Comprehensive Discharge Instruction          Electronically signed by Saeed Kuhn MD on 11/22/2017 at 10:21 AM

## 2017-11-27 ENCOUNTER — HOSPITAL ENCOUNTER (OUTPATIENT)
Dept: WOUND CARE | Age: 52
Discharge: HOME | End: 2017-11-27
Attending: SPECIALIST | Admitting: SPECIALIST

## 2017-11-27 VITALS
TEMPERATURE: 97.7 F | DIASTOLIC BLOOD PRESSURE: 75 MMHG | SYSTOLIC BLOOD PRESSURE: 150 MMHG | RESPIRATION RATE: 16 BRPM | HEART RATE: 78 BPM

## 2017-11-27 ASSESSMENT — PAIN DESCRIPTION - ONSET: ONSET: ON-GOING

## 2017-11-27 ASSESSMENT — PAIN SCALES - GENERAL: PAINLEVEL_OUTOF10: 7

## 2017-11-27 ASSESSMENT — PAIN DESCRIPTION - LOCATION: LOCATION: LEG

## 2017-11-27 ASSESSMENT — PAIN DESCRIPTION - ORIENTATION: ORIENTATION: RIGHT

## 2017-11-27 ASSESSMENT — PAIN DESCRIPTION - DESCRIPTORS: DESCRIPTORS: BURNING

## 2017-11-27 ASSESSMENT — PAIN DESCRIPTION - PAIN TYPE: TYPE: ACUTE PAIN

## 2017-12-01 ENCOUNTER — TELEPHONE (OUTPATIENT)
Dept: WOUND CARE | Age: 52
End: 2017-12-01

## 2017-12-01 ENCOUNTER — HOSPITAL ENCOUNTER (OUTPATIENT)
Dept: WOUND CARE | Age: 52
Discharge: HOME | End: 2017-12-01
Attending: SPECIALIST | Admitting: SPECIALIST

## 2017-12-01 VITALS
RESPIRATION RATE: 20 BRPM | DIASTOLIC BLOOD PRESSURE: 79 MMHG | TEMPERATURE: 97.9 F | SYSTOLIC BLOOD PRESSURE: 130 MMHG | HEART RATE: 68 BPM

## 2017-12-01 DIAGNOSIS — I87.311 IDIOPATHIC CHRONIC VENOUS HYPERTENSION OF RIGHT LOWER EXTREMITY WITH ULCER (HCC): ICD-10-CM

## 2017-12-01 DIAGNOSIS — I82.423 THROMBOSIS OF BOTH ILIAC VEINS (HCC): ICD-10-CM

## 2017-12-01 DIAGNOSIS — L97.919 IDIOPATHIC CHRONIC VENOUS HYPERTENSION OF RIGHT LOWER EXTREMITY WITH ULCER (HCC): ICD-10-CM

## 2017-12-01 DIAGNOSIS — I82.220 THROMBOSIS OF INFERIOR VENA CAVA (HCC): Primary | ICD-10-CM

## 2017-12-01 DIAGNOSIS — I89.0 CHRONIC ACQUIRED LYMPHEDEMA: ICD-10-CM

## 2017-12-01 PROCEDURE — 11042 DBRDMT SUBQ TIS 1ST 20SQCM/<: CPT | Performed by: SPECIALIST

## 2017-12-01 PROCEDURE — 11045 DBRDMT SUBQ TISS EACH ADDL: CPT | Performed by: SPECIALIST

## 2017-12-01 RX ORDER — LIDOCAINE HYDROCHLORIDE 40 MG/ML
2.5 SOLUTION TOPICAL ONCE
Status: DISCONTINUED | OUTPATIENT
Start: 2017-12-01 | End: 2017-12-02 | Stop reason: HOSPADM

## 2017-12-01 ASSESSMENT — PAIN DESCRIPTION - ONSET: ONSET: ON-GOING

## 2017-12-01 ASSESSMENT — PAIN DESCRIPTION - DESCRIPTORS: DESCRIPTORS: BURNING

## 2017-12-01 ASSESSMENT — PAIN DESCRIPTION - LOCATION: LOCATION: LEG

## 2017-12-01 ASSESSMENT — PAIN SCALES - GENERAL: PAINLEVEL_OUTOF10: 7

## 2017-12-01 ASSESSMENT — PAIN DESCRIPTION - FREQUENCY: FREQUENCY: CONTINUOUS

## 2017-12-01 ASSESSMENT — PAIN DESCRIPTION - PROGRESSION: CLINICAL_PROGRESSION: GRADUALLY WORSENING

## 2017-12-01 ASSESSMENT — PAIN DESCRIPTION - PAIN TYPE: TYPE: ACUTE PAIN

## 2017-12-01 NOTE — PROGRESS NOTES
Procedure Note  Indications:  Based on my examination of this patient's wound(s) today, sharp excision is required to promote healing and evaluate the extent healing. Performed by: Parul Oviedo MD    Consent obtained? Yes    Time out taken: Yes    Pain Control: Anesthetic: 4% Lidocaine Liquid Topical     Debridement:Excisional Debridement    Using curette the wound was sharply debrided    down through and including the removal of  epidermis, dermis and subcutaneous tissue. Devitalized Tissue Debrided:  fibrin, biofilm and slough      Pre Debridement Measurements:  Are located in the Wound Documentation Flow Sheet   Wound #: 1, 2 and 3     Post  Debridement Measurements:  Wound 03/06/17 Leg Right; Lower; Anterior #1  venous/lymphedema (stage 3) (Active)   Wound Type Wound 12/1/2017 10:07 AM   Wound Venous 12/1/2017 10:07 AM   Dressing Status Intact; New drainage 9/14/2017  8:27 AM   Dressing/Treatment Other (Comment) 12/1/2017 10:07 AM   Wound Cleansed Rinsed/Irrigated with saline 12/1/2017 10:07 AM   Wound Length (cm) 5.4 cm 12/1/2017 10:07 AM   Wound Width (cm) 9.3 cm 12/1/2017 10:07 AM   Wound Depth (cm)  0.2 12/1/2017 10:07 AM   Calculated Wound Size (cm^2) (l*w) 50.22 cm^2 12/1/2017 10:07 AM   Change in Wound Size % (l*w) -93.9 12/1/2017 10:07 AM   Distance Tunneling (cm) 0 cm 12/1/2017 10:07 AM   Tunneling Position ___ O'Clock 0 12/1/2017 10:07 AM   Undermining Starts ___ O'Clock 0 12/1/2017 10:07 AM   Undermining Ends___ O'Clock 0 12/1/2017 10:07 AM   Undermining Maxium Distance (cm) 0 12/1/2017 10:07 AM   Wound Assessment Red;Pink;Yellow 12/1/2017 10:07 AM   Drainage Amount Large 12/1/2017 10:07 AM   Drainage Description Yellow;Black 12/1/2017 10:07 AM   Odor Strong 12/1/2017 10:07 AM   Margins Defined edges 12/1/2017 10:07 AM   Kiah-wound Assessment Purple;Pink 12/1/2017 10:07 AM   Non-staged Wound Description Full thickness 12/1/2017 10:07 AM   [place]%Wound Bed 20 12/1/2017 10:07 AM   Red%Wound Bed 60 Rinsed/Irrigated with saline 12/1/2017 10:07 AM   Wound Length (cm) 8.1 cm 12/1/2017 10:07 AM   Wound Width (cm) 10.4 cm 12/1/2017 10:07 AM   Wound Depth (cm)  0.1 12/1/2017 10:07 AM   Calculated Wound Size (cm^2) (l*w) 84.24 cm^2 12/1/2017 10:07 AM   Change in Wound Size % (l*w) -206.33 12/1/2017 10:07 AM   Distance Tunneling (cm) 0 cm 12/1/2017 10:07 AM   Tunneling Position ___ O'Clock 0 11/27/2017 10:06 AM   Undermining Starts ___ O'Clock 0 11/27/2017 10:06 AM   Undermining Ends___ O'Clock 0 11/27/2017 10:06 AM   Undermining Maxium Distance (cm) 0 12/1/2017 10:07 AM   Wound Assessment Black;Red;Yellow;Pink 12/1/2017 10:07 AM   Drainage Amount Large 12/1/2017 10:07 AM   Drainage Description Brown;Serosanguinous 12/1/2017 10:07 AM   Odor Strong 12/1/2017 10:07 AM   Margins Undefined edges 12/1/2017 10:07 AM   Kaih-wound Assessment Purple;Pink 12/1/2017 10:07 AM   Non-staged Wound Description Full thickness 12/1/2017 10:07 AM   [place]%Wound Bed 30 12/1/2017 10:07 AM   Red%Wound Bed 30 12/1/2017 10:07 AM   Yellow%Wound Bed 20 12/1/2017 10:07 AM   Black%Wound Bed 20 12/1/2017 10:07 AM   Purple%Wound Bed 0 5/8/2017 10:35 AM   Other%Wound Bed 50 10/24/2017  8:33 AM   Number of days: 270       Percent of Wound Debrided: 50%    Total Surface Area Debrided:  141.4 sq cm    Non Pressure Ulcers only:  [] Limited to breakdown of the skin [x] With fat layer exposed  [] With necrosis of muscle  [] With unspecified severity  [] With necrosis of bone  []       Bleeding: Minimal    Hemostasis Achieved: by pressure    Procedural Pain: 0  / 10     Post Procedural Pain: 0 / 10     Response to treatment:  Well tolerated by patient.          Plan:     Treatment Note please see attached Discharge Instructions    Is this Patient a candidate for HBO: No    Written Patient Dismissal Instructions Given       Patient is to return to wound care center in:  1 week(s)    Discharge [facility] Physician Orders and Discharge NOTE: IF YOU ARE UNABLE TO OBTAIN WOUND SUPPLIES, CONTINUE TO USE THE SUPPLIES YOU HAVE AVAILABLE UNTIL YOU ARE ABLE TO REACH US. IT IS MOST IMPORTANT TO KEEP THE WOUND COVERED AT ALL TIMES. Physician orders by:     [] Dr Krishna Banerjee [] Dr Becka Ivory    [] Dr Caden Orlando     [] Dr Rayne Gan   [x] Dr Corrinne Pillar  [] Dr Judy Foster       Physician Signature:__________________________________        The information contained in the After Visit Summary has been reviewed with me, the patient and/or responsible adult, by my health care provider(s). I had the opportunity to ask questions regarding this information.   I have elected to receive;      [] Patient unable to sign Discharge Instructions given to ECF/Transportation/POA      []  After Visit Summary  []  Comprehensive Discharge Instruction          Electronically signed by Gracy Hui MD on 12/1/2017 at 12:57 PM

## 2017-12-01 NOTE — PROGRESS NOTES
Multilayer Compression Wrap   (Not Unna) Below the Knee    NAME:  Charles Mcadams  YOB: 1965  MEDICAL RECORD NUMBER:  [id number]  DATE:  12/1/2017       [x] Removed old Multilayer wrap if indicated and wash leg with mild soap/water.  [x] Applied moisturizing agent to dry skin as needed.  [x] Applied primary and secondary dressing as ordered    [x] Applied multilayered dressing below the right    knee to  Profore per 's instructions.  [x] Instructed patient/caregiver not to remove dressing and to keep it clean and dry.  [x] Instructed patient/caregiver on complications to report to provider, such as pain, numbness in toes, heavy drainage, and slippage of dressing.  [x] Instructed patient on purpose of compression dressing and on activity and exercise recommendations.        Applied per   Guidelines    Electronically signed by Michelle Chau RN on 12/1/2017 at 11:10 AM

## 2017-12-04 ENCOUNTER — HOSPITAL ENCOUNTER (OUTPATIENT)
Dept: WOUND CARE | Age: 52
Discharge: HOME | End: 2017-12-04
Attending: SPECIALIST | Admitting: SPECIALIST

## 2017-12-04 VITALS
SYSTOLIC BLOOD PRESSURE: 143 MMHG | RESPIRATION RATE: 18 BRPM | HEART RATE: 76 BPM | DIASTOLIC BLOOD PRESSURE: 84 MMHG | TEMPERATURE: 98 F

## 2017-12-04 ASSESSMENT — PAIN DESCRIPTION - ORIENTATION: ORIENTATION: RIGHT

## 2017-12-04 ASSESSMENT — PAIN SCALES - GENERAL: PAINLEVEL_OUTOF10: 7

## 2017-12-04 ASSESSMENT — PAIN DESCRIPTION - PAIN TYPE: TYPE: CHRONIC PAIN

## 2017-12-04 ASSESSMENT — PAIN DESCRIPTION - LOCATION: LOCATION: LEG

## 2017-12-06 ENCOUNTER — HOSPITAL ENCOUNTER (OUTPATIENT)
Dept: WOUND CARE | Age: 52
Discharge: HOME | End: 2017-12-06
Attending: SPECIALIST | Admitting: SPECIALIST

## 2017-12-06 VITALS
HEART RATE: 73 BPM | SYSTOLIC BLOOD PRESSURE: 137 MMHG | RESPIRATION RATE: 18 BRPM | TEMPERATURE: 98 F | DIASTOLIC BLOOD PRESSURE: 88 MMHG

## 2017-12-06 ASSESSMENT — PAIN DESCRIPTION - PROGRESSION: CLINICAL_PROGRESSION: NOT CHANGED

## 2017-12-06 ASSESSMENT — PAIN DESCRIPTION - ORIENTATION: ORIENTATION: RIGHT

## 2017-12-06 ASSESSMENT — PAIN DESCRIPTION - ONSET: ONSET: ON-GOING

## 2017-12-06 ASSESSMENT — PAIN DESCRIPTION - LOCATION: LOCATION: LEG

## 2017-12-06 ASSESSMENT — PAIN DESCRIPTION - DESCRIPTORS: DESCRIPTORS: BURNING

## 2017-12-06 ASSESSMENT — PAIN DESCRIPTION - PAIN TYPE: TYPE: CHRONIC PAIN

## 2017-12-06 ASSESSMENT — PAIN DESCRIPTION - FREQUENCY: FREQUENCY: CONTINUOUS

## 2017-12-06 ASSESSMENT — PAIN SCALES - GENERAL: PAINLEVEL_OUTOF10: 7

## 2017-12-08 ENCOUNTER — HOSPITAL ENCOUNTER (OUTPATIENT)
Dept: WOUND CARE | Age: 52
Discharge: HOME | End: 2017-12-08
Attending: SPECIALIST | Admitting: SPECIALIST

## 2017-12-08 VITALS
TEMPERATURE: 97.5 F | HEART RATE: 85 BPM | DIASTOLIC BLOOD PRESSURE: 85 MMHG | RESPIRATION RATE: 16 BRPM | SYSTOLIC BLOOD PRESSURE: 158 MMHG

## 2017-12-08 DIAGNOSIS — Z79.01 CHRONIC ANTICOAGULATION: ICD-10-CM

## 2017-12-08 DIAGNOSIS — I87.311 IDIOPATHIC CHRONIC VENOUS HYPERTENSION OF RIGHT LOWER EXTREMITY WITH ULCER (HCC): Primary | ICD-10-CM

## 2017-12-08 DIAGNOSIS — L97.919 IDIOPATHIC CHRONIC VENOUS HYPERTENSION OF RIGHT LOWER EXTREMITY WITH ULCER (HCC): Primary | ICD-10-CM

## 2017-12-08 DIAGNOSIS — I89.0 CHRONIC ACQUIRED LYMPHEDEMA: ICD-10-CM

## 2017-12-08 PROCEDURE — 11042 DBRDMT SUBQ TIS 1ST 20SQCM/<: CPT | Performed by: SPECIALIST

## 2017-12-08 PROCEDURE — 11045 DBRDMT SUBQ TISS EACH ADDL: CPT | Performed by: SPECIALIST

## 2017-12-08 RX ORDER — LIDOCAINE 50 MG/G
OINTMENT TOPICAL ONCE
Status: COMPLETED | OUTPATIENT
Start: 2017-12-08 | End: 2017-12-08

## 2017-12-08 RX ADMIN — LIDOCAINE: 50 OINTMENT TOPICAL at 11:09

## 2017-12-08 NOTE — PROGRESS NOTES
Procedure Note  Indications:  Based on my examination of this patient's wound(s) today, sharp excision is required to promote healing and evaluate the extent healing. Performed by: Gina David MD    Consent obtained? Yes    Time out taken: Yes    Pain Control:       Debridement:Excisional Debridement    Using curette the wound was sharply debrided    down through and including the removal of  epidermis, dermis and subcutaneous tissue. Devitalized Tissue Debrided:  fibrin, biofilm and slough      Pre Debridement Measurements:  Are located in the Wound Documentation Flow Sheet   Wound #: 1, 2 and 3     Post  Debridement Measurements:  Wound 03/06/17 Leg Right; Lower; Anterior #1  venous/lymphedema (stage 3) (Active)   Wound Type Incision 12/8/2017 10:22 AM   Wound Venous 12/1/2017 10:07 AM   Dressing Status Clean;Dry; Intact 12/4/2017 11:24 AM   Dressing/Treatment Other (Comment) 12/1/2017 10:07 AM   Wound Cleansed Other (Comment) 12/4/2017 10:57 AM   Dressing Change Due 12/06/17 12/6/2017  8:11 AM   Wound Length (cm) 5.4 cm 12/8/2017 10:22 AM   Wound Width (cm) 9.3 cm 12/8/2017 10:22 AM   Wound Depth (cm)  0.2 12/8/2017 10:22 AM   Calculated Wound Size (cm^2) (l*w) 50.22 cm^2 12/8/2017 10:22 AM   Change in Wound Size % (l*w) -93.9 12/8/2017 10:22 AM   Distance Tunneling (cm) 0 cm 12/4/2017 10:57 AM   Tunneling Position ___ O'Clock 0 12/1/2017 10:07 AM   Undermining Starts ___ O'Clock 0 12/1/2017 10:07 AM   Undermining Ends___ O'Clock 0 12/1/2017 10:07 AM   Undermining Maxium Distance (cm) 0 12/4/2017 10:57 AM   Wound Assessment Pink;Red;Yellow 12/8/2017 10:22 AM   Drainage Amount Copious 12/8/2017 10:22 AM   Drainage Description Brown;Serosanguinous; Yellow 12/8/2017 10:22 AM   Odor Strong 12/8/2017 10:22 AM   Margins Defined edges 12/8/2017 10:22 AM   Kiah-wound Assessment Pink;Purple 12/8/2017 10:22 AM   Non-staged Wound Description Full thickness 12/8/2017 10:22 AM   [place]%Wound Bed 20 12/1/2017 10:07 AM Red%Wound Bed 60 12/1/2017 10:07 AM   Yellow%Wound Bed 20 12/1/2017 10:07 AM   Black%Wound Bed 5 11/22/2017  8:48 AM   Purple%Wound Bed 00 5/8/2017 10:35 AM   Other%Wound Bed 20% brown 11/27/2017 10:06 AM   Number of days: 277       Wound 03/06/17 Leg Right; Lower;Medial #2 venous/ lymphedema (stage 3) (Active)   Wound Type Wound 12/8/2017 10:22 AM   Wound Venous 12/1/2017 10:07 AM   Dressing Status Intact 8/16/2017  9:04 AM   Dressing Changed Changed/New 12/4/2017 11:24 AM   Dressing/Treatment Other (Comment) 12/1/2017 10:07 AM   Wound Cleansed Other (Comment) 12/4/2017 10:57 AM   Wound Length (cm) 13.5 cm 12/8/2017 10:22 AM   Wound Width (cm) 11 cm 12/8/2017 10:22 AM   Wound Depth (cm)  0.1 12/8/2017 10:22 AM   Calculated Wound Size (cm^2) (l*w) 148.5 cm^2 12/8/2017 10:22 AM   Change in Wound Size % (l*w) -263.97 12/8/2017 10:22 AM   Distance Tunneling (cm) 0 cm 12/4/2017 10:57 AM   Tunneling Position ___ O'Clock 0 12/1/2017 10:07 AM   Undermining Starts ___ O'Clock 0 12/1/2017 10:07 AM   Undermining Ends___ O'Clock 0 12/1/2017 10:07 AM   Undermining Maxium Distance (cm) 0 12/4/2017 10:57 AM   Wound Assessment Black;Red;Yellow 12/8/2017 10:22 AM   Drainage Amount Copious 12/8/2017 10:22 AM   Drainage Description Serosanguinous; Yellow 12/8/2017 10:22 AM   Odor Strong 12/8/2017 10:22 AM   Margins Undefined edges 12/8/2017 10:22 AM   Kiah-wound Assessment Maceration;Pink 12/8/2017 10:22 AM   Non-staged Wound Description Full thickness 12/8/2017 10:22 AM   [place]%Wound Bed 15 8/28/2017 10:37 AM   Red%Wound Bed 75 12/1/2017 10:07 AM   Yellow%Wound Bed 25 12/1/2017 10:07 AM   Black%Wound Bed 10 12/1/2017 10:07 AM   Purple%Wound Bed 0 5/8/2017 10:35 AM   Other%Wound Bed 25%brown 11/27/2017 10:06 AM   Number of days: 277       Wound 03/06/17 Leg Right; Lower; Posterior #3 venous/ lymphedema (stage 3) (Active)   Wound Type Wound 12/8/2017 10:22 AM   Wound Venous 12/1/2017 10:07 AM   Dressing Status Intact 9/15/2017  8:42 AM Dressing Changed Changed/New 12/4/2017 11:24 AM   Dressing/Treatment Other (Comment) 12/1/2017 10:07 AM   Wound Cleansed Rinsed/Irrigated with saline 12/1/2017 10:07 AM   Wound Length (cm) 9.7 cm 12/8/2017 10:22 AM   Wound Width (cm) 10.7 cm 12/8/2017 10:22 AM   Wound Depth (cm)  0.1 12/8/2017 10:22 AM   Calculated Wound Size (cm^2) (l*w) 103.79 cm^2 12/8/2017 10:22 AM   Change in Wound Size % (l*w) -277.42 12/8/2017 10:22 AM   Distance Tunneling (cm) 0 cm 12/4/2017 10:57 AM   Tunneling Position ___ O'Clock 0 11/27/2017 10:06 AM   Undermining Starts ___ O'Clock 0 11/27/2017 10:06 AM   Undermining Ends___ O'Clock 0 11/27/2017 10:06 AM   Undermining Maxium Distance (cm) 0 12/4/2017 10:57 AM   Wound Assessment Black;Red;Yellow 12/8/2017 10:22 AM   Drainage Amount Copious 12/8/2017 10:22 AM   Drainage Description Serosanguinous; Yellow 12/8/2017 10:22 AM   Odor Strong 12/8/2017 10:22 AM   Margins Undefined edges 12/8/2017 10:22 AM   Kiah-wound Assessment Maceration;Pink;Purple 12/8/2017 10:22 AM   Non-staged Wound Description Partial thickness 12/8/2017 10:22 AM   [place]%Wound Bed 30 12/1/2017 10:07 AM   Red%Wound Bed 30 12/1/2017 10:07 AM   Yellow%Wound Bed 20 12/1/2017 10:07 AM   Black%Wound Bed 20 12/1/2017 10:07 AM   Purple%Wound Bed 0 5/8/2017 10:35 AM   Other%Wound Bed 50 10/24/2017  8:33 AM   Number of days: 277       Percent of Wound Debrided: 50%    Total Surface Area Debrided:  151.2 sq cm    Non Pressure Ulcers only:  [] Limited to breakdown of the skin [x] With fat layer exposed  [] With necrosis of muscle  [] With unspecified severity  [] With necrosis of bone  []       Bleeding: Minimal    Hemostasis Achieved: by pressure    Procedural Pain: 0  / 10     Post Procedural Pain: 0 / 10     Response to treatment:  Well tolerated by patient.  Patient to have [facility] 12/[facility]/17        Plan:     Treatment Note please see attached Discharge Instructions    Is this Secure with:     [] Gauze [] ABD [] Stretch bandage roll [] Kerlix   [] Coban [] Ace Wrap [] Cover Roll Tape    [] Other:    Avoid contact of tape with skin. [x] Change dressing:  [] Daily    [] Every Other Day [] Three times per week   [] Once a week [x] Do Not Change Dressing   [] Other:          Compression:  Type: profore 4 layer wrap  ·  Multilayer Compression Wrap Applied in Clinic [x]Right Leg []Left Leg  · Do not get leg(s) with wrap wet. ·  If wraps become too tight call the center or completely remove the wrap. · Elevate leg(s) above the level of the heart when sitting. · Avoid prolonged standing in one place. [] [address] [facility] remove wrap, cleanse affected leg(s) with soap and water, apply wound dressings as ordered above and reapply compression wraps on:     Off-Loading:   [] Off-loading when: [] walking  [] in bed [] sitting    [] Total non-weight bearing:  [] Right Leg  [] Left Leg     [] Partial weight bearing:   [] Right Leg  [] Left Leg     [x] Assistive Device: [] Walker [] Crutches  [] Wheelchair [] Roll About   [] Surgical shoe/    [] Podus Boot(s)   [] Prevalon Boot(s)  [] CROW Boot      Dietary:  Important dietary reminders:  1. Increase Protein intake (i.e. Lean meats, fish, eggs, legumes, and yogurt)  2. No added salt  3. If diabetic, follow a diabetic diet and check glucose prior to meals or as instructed by your physician.         Return Appointment:  [] Wound and dressing supply provider:   [] ECF or Home Healthcare:  [x] Nurse visit: Next Monday and Wednesday  [x] Return Appointment: With Dr. Kwasi Swan   in [facility])    [x] Continue to use lymphedema pumps in 1 hour increments twice daily      Your nurse  is:  Tamara Cedeno     Electronically signed by Randi Ji RN on 12/8/2017 at [address] [place] [address] Information: Should you experience any significant changes in your wound(s) or have questions about your wound care, please contact the [facility] [address] at [phone] Monday and Wednesday 8:00 am - 2:00 pm and Tuesday, Thursday and Friday from 8:00 am - 4:30 pm.   If you need help with your wound outside these hours and cannot wait until we are again available, contact your PCP or go to the hospital emergency room. PLEASE NOTE: IF YOU ARE UNABLE TO OBTAIN WOUND SUPPLIES, CONTINUE TO USE THE SUPPLIES YOU HAVE AVAILABLE UNTIL YOU ARE ABLE TO REACH US. IT IS MOST IMPORTANT TO KEEP THE WOUND COVERED AT ALL TIMES. Physician orders by:     [] Dr Anurag Hair [] Dr Kath Del Rio    [] Dr Demario Martinez     [] Dr Pablo Levine   [x] Dr Miquel Boyce  [] Dr Lj Hwang       Physician Signature:__________________________________        The information contained in the After Visit Summary has been reviewed with me, the patient and/or responsible adult, by my health care provider(s). I had the opportunity to ask questions regarding this information.   I have elected to receive;      [] Patient unable to sign Discharge Instructions given to ECF/Transportation/POA      []  After Visit Summary  []  Comprehensive Discharge Instruction          Electronically signed by Phill Briones MD on 12/8/2017 at 12:20 PM

## 2017-12-08 NOTE — PROGRESS NOTES
Multilayer Compression Wrap   (Not Unna) Below the Knee    NAME:  Jose Vieira  YOB: 1965  MEDICAL RECORD NUMBER:  [id number]  DATE:  12/8/2017       [x] Removed old Multilayer wrap if indicated and wash leg with mild soap/water.  [x] Applied moisturizing agent to dry skin as needed.  [x] Applied primary and secondary dressing as ordered    [x] Applied multilayered dressing below the knee to  Right lower leg  /Profore) per [facility] instructions.  [x] Instructed patient/caregiver not to remove dressing and to keep it clean and dry.  [x] Instructed patient/caregiver on complications to report to provider, such as pain, numbness in toes, heavy drainage, and slippage of dressing.  [x] Instructed patient on purpose of compression dressing and on activity and exercise recommendations.        Applied per   Guidelines    Electronically signed by Annamarie Castaneda RN on 12/8/2017 at 11:25 AM

## 2017-12-11 ENCOUNTER — HOSPITAL ENCOUNTER (OUTPATIENT)
Dept: WOUND CARE | Age: 52
Discharge: HOME | End: 2017-12-11
Attending: SPECIALIST | Admitting: SPECIALIST

## 2017-12-11 VITALS
TEMPERATURE: 98 F | RESPIRATION RATE: 20 BRPM | SYSTOLIC BLOOD PRESSURE: 136 MMHG | HEART RATE: 67 BPM | DIASTOLIC BLOOD PRESSURE: 84 MMHG

## 2017-12-11 ASSESSMENT — PAIN DESCRIPTION - DESCRIPTORS: DESCRIPTORS: BURNING

## 2017-12-11 ASSESSMENT — PAIN DESCRIPTION - ONSET: ONSET: ON-GOING

## 2017-12-11 ASSESSMENT — PAIN DESCRIPTION - PAIN TYPE: TYPE: CHRONIC PAIN

## 2017-12-11 ASSESSMENT — PAIN DESCRIPTION - FREQUENCY: FREQUENCY: CONTINUOUS

## 2017-12-11 ASSESSMENT — PAIN DESCRIPTION - LOCATION: LOCATION: LEG

## 2017-12-11 ASSESSMENT — PAIN SCALES - GENERAL: PAINLEVEL_OUTOF10: 7

## 2017-12-11 ASSESSMENT — PAIN DESCRIPTION - ORIENTATION: ORIENTATION: RIGHT

## 2017-12-11 ASSESSMENT — PAIN DESCRIPTION - PROGRESSION: CLINICAL_PROGRESSION: NOT CHANGED

## 2017-12-13 ENCOUNTER — HOSPITAL ENCOUNTER (OUTPATIENT)
Dept: WOUND CARE | Age: 52
Discharge: HOME | End: 2017-12-13
Attending: SPECIALIST | Admitting: SPECIALIST

## 2017-12-13 VITALS
SYSTOLIC BLOOD PRESSURE: 142 MMHG | HEART RATE: 68 BPM | DIASTOLIC BLOOD PRESSURE: 86 MMHG | TEMPERATURE: 97.7 F | RESPIRATION RATE: 16 BRPM

## 2017-12-13 ASSESSMENT — PAIN SCALES - GENERAL: PAINLEVEL_OUTOF10: 7

## 2017-12-13 ASSESSMENT — PAIN DESCRIPTION - FREQUENCY: FREQUENCY: CONTINUOUS

## 2017-12-13 ASSESSMENT — PAIN DESCRIPTION - PROGRESSION: CLINICAL_PROGRESSION: NOT CHANGED

## 2017-12-13 ASSESSMENT — PAIN DESCRIPTION - PAIN TYPE: TYPE: CHRONIC PAIN

## 2017-12-13 ASSESSMENT — PAIN DESCRIPTION - ONSET: ONSET: ON-GOING

## 2017-12-13 ASSESSMENT — PAIN DESCRIPTION - LOCATION: LOCATION: LEG

## 2017-12-13 ASSESSMENT — PAIN DESCRIPTION - ORIENTATION: ORIENTATION: RIGHT

## 2017-12-13 ASSESSMENT — PAIN DESCRIPTION - DESCRIPTORS: DESCRIPTORS: BURNING

## 2017-12-15 ENCOUNTER — HOSPITAL ENCOUNTER (OUTPATIENT)
Dept: WOUND CARE | Age: 52
Discharge: HOME | End: 2017-12-15
Attending: SPECIALIST | Admitting: SPECIALIST

## 2017-12-15 VITALS
DIASTOLIC BLOOD PRESSURE: 81 MMHG | RESPIRATION RATE: 16 BRPM | TEMPERATURE: 98 F | SYSTOLIC BLOOD PRESSURE: 133 MMHG | HEART RATE: 67 BPM

## 2017-12-15 DIAGNOSIS — I82.220 THROMBOSIS OF INFERIOR VENA CAVA (HCC): Primary | ICD-10-CM

## 2017-12-15 DIAGNOSIS — I87.311 IDIOPATHIC CHRONIC VENOUS HYPERTENSION OF RIGHT LOWER EXTREMITY WITH ULCER (HCC): ICD-10-CM

## 2017-12-15 DIAGNOSIS — I82.423 THROMBOSIS OF BOTH ILIAC VEINS (HCC): ICD-10-CM

## 2017-12-15 DIAGNOSIS — L97.919 IDIOPATHIC CHRONIC VENOUS HYPERTENSION OF RIGHT LOWER EXTREMITY WITH ULCER (HCC): ICD-10-CM

## 2017-12-15 PROCEDURE — 11042 DBRDMT SUBQ TIS 1ST 20SQCM/<: CPT | Performed by: SPECIALIST

## 2017-12-15 PROCEDURE — 11045 DBRDMT SUBQ TISS EACH ADDL: CPT | Performed by: SPECIALIST

## 2017-12-15 RX ORDER — LIDOCAINE HYDROCHLORIDE 40 MG/ML
2.5 SOLUTION TOPICAL ONCE
Status: COMPLETED | OUTPATIENT
Start: 2017-12-15 | End: 2017-12-15

## 2017-12-15 RX ADMIN — LIDOCAINE HYDROCHLORIDE 2.5 ML: 40 SOLUTION TOPICAL at 10:11

## 2017-12-15 ASSESSMENT — PAIN DESCRIPTION - ORIENTATION: ORIENTATION: RIGHT

## 2017-12-15 ASSESSMENT — PAIN DESCRIPTION - LOCATION: LOCATION: LEG

## 2017-12-15 ASSESSMENT — PAIN DESCRIPTION - PAIN TYPE: TYPE: CHRONIC PAIN

## 2017-12-15 ASSESSMENT — PAIN SCALES - GENERAL: PAINLEVEL_OUTOF10: 6

## 2017-12-15 ASSESSMENT — PAIN DESCRIPTION - FREQUENCY: FREQUENCY: CONTINUOUS

## 2017-12-15 NOTE — PROGRESS NOTES
Procedure Note  Indications:  Based on my examination of this patient's wound(s) today, sharp excision is required to promote healing and evaluate the extent healing. Performed by: Gina David MD    Consent obtained? Yes    Time out taken: Yes    Pain Control: Anesthetic: 4% Lidocaine Liquid Topical     Debridement:Excisional Debridement    Using curette the wound was sharply debrided    down through and including the removal of  epidermis, dermis and subcutaneous tissue. Devitalized Tissue Debrided:  fibrin, biofilm and slough      Pre Debridement Measurements:  Are located in the Wound Documentation Flow Sheet   Wound #: 1, 2 and 3     Post  Debridement Measurements:  Wound 03/06/17 Leg Right; Lower; Anterior #1  venous/lymphedema (stage 3) (Active)   Wound Type Wound 12/15/2017  9:51 AM   Wound Venous 12/13/2017  8:12 AM   Dressing Status Clean;Dry; Intact 12/4/2017 11:24 AM   Dressing/Treatment Other (Comment) 12/15/2017  9:51 AM   Wound Cleansed Rinsed/Irrigated with saline 12/15/2017  9:51 AM   Dressing Change Due 12/06/17 12/6/2017  8:11 AM   Wound Length (cm) 5.7 cm 12/15/2017  9:51 AM   Wound Width (cm) 9.4 cm 12/15/2017  9:51 AM   Wound Depth (cm)  0.2 12/15/2017  9:51 AM   Calculated Wound Size (cm^2) (l*w) 53.58 cm^2 12/15/2017  9:51 AM   Change in Wound Size % (l*w) -106.87 12/15/2017  9:51 AM   Distance Tunneling (cm) 0 cm 12/15/2017  9:51 AM   Tunneling Position ___ O'Clock 0 12/1/2017 10:07 AM   Undermining Starts ___ O'Clock 0 12/1/2017 10:07 AM   Undermining Ends___ O'Clock 0 12/1/2017 10:07 AM   Undermining Maxium Distance (cm) 0 12/15/2017  9:51 AM   Wound Assessment Pink;Red;Slough; Yellow 12/15/2017  9:51 AM   Drainage Amount Copious 12/15/2017  9:51 AM   Drainage Description Serosanguinous 12/15/2017  9:51 AM   Odor Strong 12/15/2017  9:51 AM   Margins Defined edges 12/15/2017  9:51 AM   Kiah-wound Assessment Edema; Excoriated;Pink;Purple 12/15/2017  9:51 AM   Non-staged Wound Discharge Instructions Patient to undergo surgery at the [facility] next week in an 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 Is this Patient a candidate for HBO: No    Written Patient Dismissal Instructions Given       Patient is to return to wound care center in:  1 week(s)    Discharge [facility] Physician Orders and Discharge Instructions  [facility]  [address], [address]  Telephone: [phone]    NAME:  Earline Claude  YOB: 1965  MEDICAL RECORD NUMBER:  [id number]  DATE:  12/15/2017    Wash hands with soap and water prior to and after every dressing change. Wound Cleansing:   · Do not scrub or use excessive force. · With each dressing change, rinse wounds with 0.9% Saline. (May use wound wash or soft contact solution. Both can be purchased at a local drug store). · If unable to obtain saline, may use a gentle soap and water. · Keep wounds dry in the shower unless otherwise instructed by the physician. Topical Treatments:  Do not apply lotions, creams, or ointments to wound bed unless directed. [] Apply moisturizing lotion to skin surrounding the wound prior to dressing change.  [] Apply antifungal ointment to skin surrounding the wound prior to dressing change. [x] Apply thin film of moisture barrier ointment to skin immediately around wound. - [facility]  [] Other:        Dressings:           Wound Location: right lower leg wounds     [x] Apply Primary Dressing to wound:       [] Foam/Foam with Border(i.e Mepilex) [] Non-adherent (i.e.Mepitel)   [] Alginate with Silver(i.e. Silvercel) [] Alginate   [] Collagen(i.e. Puracol) [] Collagen with Silver(i.e. Silvercel)    [] Hydrocolloid  [] Hydrafera Blue moistened with saline   [] MediHoney Paste/Gel [] Hydrogel      [] Santyl with Moisten saline gauze   [] Bactroban/Mupirocin [] Polysporin  [x] Other: cutimed sorbact     Pack wound loosely with  [] Iodoform   [] Plain Packing  [] Other      [x] Cover and Secure with:     [] Gauze [x] ABD [] Stretch bandage roll [x] Kerlix   [] Coban [] Ace Wrap [] Cover Roll Tape    [x] Other:optilock    Avoid contact of tape with skin. [x] Change dressing:  [] Daily    [] Every Other Day [] Three times per week   [] Once a week [x] Do Not Change Dressing   [] Other:        Compression:  Type: profore  ·  Multilayer Compression Wrap Applied in Clinic [x]Right Leg []Left Leg  · Do not get leg(s) with wrap wet. ·  If wraps become too tight call the center or completely remove the wrap. · Elevate leg(s) above the level of the heart when sitting. · Avoid prolonged standing in one place. [] [facility] remove wrap, cleanse affected leg(s) with soap and water, apply wound dressings as ordered above and reapply compression wraps on:        Dietary:  Important dietary reminders:  1. Increase Protein intake (i.e. Lean meats, fish, eggs, legumes, and yogurt)  2. No added salt  3. If diabetic, follow a diabetic diet and check glucose prior to meals or as instructed by your physician. Return Appointment:  [] Wound and dressing supply provider:   [] ECF or Home Healthcare:  [] Nurse visit:    [x] Return Appointment: With Dr. Mikey Gutierrez  in  1 Week(s)    [x] Continue to use lymphedema pumps twice daily in 1 hour increments       Your nurse  is:  Tatiana Bey     Electronically signed by Viet White RN on 12/15/2017 at 10:36 AM     [address] [place] [address] Information: Should you experience any significant changes in your wound(s) or have questions about your wound care, please contact the [address] [place] [address] at [phone] Monday and Wednesday 8:00 am - 2:00 pm and Tuesday, Thursday and Friday from 8:00 am - 4:30 pm.   If you need help with your wound outside these hours and cannot wait until we are again available, contact your PCP or go to the hospital emergency room.

## 2017-12-15 NOTE — PROGRESS NOTES
Multilayer Compression Wrap   (Not Unna) Below the Knee    NAME:  Ian Walker  YOB: 1965  MEDICAL RECORD NUMBER:  [id number]  DATE:  12/15/2017       [x] Removed old Multilayer wrap if indicated and wash leg with mild soap/water.  [x] Applied moisturizing agent to dry skin as needed.  [x] Applied primary and secondary dressing as ordered    [x] Applied multilayered dressing below the knee to right lower leg(s). profore 4-layer wrap     [x] Instructed patient/caregiver not to remove dressing and to keep it clean and dry.  [x] Instructed patient/caregiver on complications to report to provider, such as pain, numbness in toes, heavy drainage, and slippage of dressing.  [x] Instructed patient on purpose of compression dressing and on activity and exercise recommendations.     Electronically signed by Jacob Prasad RN on 12/15/2017 at 10:49 AM

## 2017-12-22 ENCOUNTER — HOSPITAL ENCOUNTER (OUTPATIENT)
Dept: WOUND CARE | Age: 52
Discharge: HOME | End: 2017-12-22
Attending: SPECIALIST | Admitting: SPECIALIST

## 2017-12-22 VITALS
DIASTOLIC BLOOD PRESSURE: 75 MMHG | TEMPERATURE: 98.4 F | HEART RATE: 82 BPM | SYSTOLIC BLOOD PRESSURE: 137 MMHG | RESPIRATION RATE: 16 BRPM

## 2017-12-22 DIAGNOSIS — I82.220 THROMBOSIS OF INFERIOR VENA CAVA (HCC): Primary | ICD-10-CM

## 2017-12-22 DIAGNOSIS — L97.919 IDIOPATHIC CHRONIC VENOUS HYPERTENSION OF RIGHT LOWER EXTREMITY WITH ULCER (HCC): ICD-10-CM

## 2017-12-22 DIAGNOSIS — I82.423 THROMBOSIS OF BOTH ILIAC VEINS (HCC): ICD-10-CM

## 2017-12-22 DIAGNOSIS — I89.0 CHRONIC ACQUIRED LYMPHEDEMA: ICD-10-CM

## 2017-12-22 DIAGNOSIS — I87.311 IDIOPATHIC CHRONIC VENOUS HYPERTENSION OF RIGHT LOWER EXTREMITY WITH ULCER (HCC): ICD-10-CM

## 2017-12-22 PROCEDURE — 11042 DBRDMT SUBQ TIS 1ST 20SQCM/<: CPT | Performed by: SPECIALIST

## 2017-12-22 PROCEDURE — 11045 DBRDMT SUBQ TISS EACH ADDL: CPT | Performed by: SPECIALIST

## 2017-12-22 PROCEDURE — 99999 PR OFFICE/OUTPT VISIT,PROCEDURE ONLY: CPT | Performed by: SPECIALIST

## 2017-12-22 RX ORDER — LIDOCAINE HYDROCHLORIDE 40 MG/ML
2.5 SOLUTION TOPICAL ONCE
Status: COMPLETED | OUTPATIENT
Start: 2017-12-22 | End: 2017-12-22

## 2017-12-22 RX ADMIN — LIDOCAINE HYDROCHLORIDE 2.5 ML: 40 SOLUTION TOPICAL at 10:27

## 2017-12-22 ASSESSMENT — PAIN DESCRIPTION - LOCATION: LOCATION: LEG

## 2017-12-22 ASSESSMENT — PAIN DESCRIPTION - PAIN TYPE: TYPE: CHRONIC PAIN

## 2017-12-22 ASSESSMENT — PAIN SCALES - GENERAL: PAINLEVEL_OUTOF10: 6

## 2017-12-22 ASSESSMENT — PAIN DESCRIPTION - ORIENTATION: ORIENTATION: RIGHT

## 2017-12-22 ASSESSMENT — PAIN DESCRIPTION - FREQUENCY: FREQUENCY: CONTINUOUS

## 2017-12-22 ASSESSMENT — PAIN DESCRIPTION - ONSET: ONSET: ON-GOING

## 2017-12-22 NOTE — PROGRESS NOTES
Procedure Note  Indications:  Based on my examination of this patient's wound(s) today, sharp excision is required to promote healing and evaluate the extent healing. Performed by: Jeff Turk MD    Consent obtained? Yes    Time out taken: Yes    Pain Control: Anesthetic: 4% Lidocaine Liquid Topical     Debridement:Excisional Debridement    Using curette the wound was sharply debrided    down through and including the removal of  epidermis, dermis and subcutaneous tissue. Devitalized Tissue Debrided:  fibrin, biofilm and slough      Pre Debridement Measurements:  Are located in the Wound Documentation Flow Sheet   Wound #: 1, 2 and 3     Post  Debridement Measurements:  Wound 03/06/17 Leg Right; Lower; Anterior #1  venous/lymphedema (stage 3) (Active)   Wound Type Wound 12/22/2017 10:18 AM   Wound Venous 12/22/2017 10:18 AM   Dressing Status Clean;Dry; Intact 12/4/2017 11:24 AM   Dressing/Treatment Other (Comment) 12/22/2017 10:18 AM   Wound Cleansed Rinsed/Irrigated with saline 12/22/2017 10:18 AM   Dressing Change Due 12/06/17 12/6/2017  8:11 AM   Wound Length (cm) 5 cm 12/22/2017 10:18 AM   Wound Width (cm) 9.1 cm 12/22/2017 10:18 AM   Wound Depth (cm)  0.2 12/22/2017 10:18 AM   Calculated Wound Size (cm^2) (l*w) 45.5 cm^2 12/22/2017 10:18 AM   Change in Wound Size % (l*w) -75.68 12/22/2017 10:18 AM   Distance Tunneling (cm) 0 cm 12/15/2017  9:51 AM   Tunneling Position ___ O'Clock 0 12/1/2017 10:07 AM   Undermining Starts ___ O'Clock 0 12/1/2017 10:07 AM   Undermining Ends___ O'Clock 0 12/1/2017 10:07 AM   Undermining Maxium Distance (cm) 0 12/15/2017  9:51 AM   Wound Assessment Pink;Red;Slough; Yellow 12/22/2017 10:18 AM   Drainage Amount Copious 12/22/2017 10:18 AM   Drainage Description Serosanguinous 12/22/2017 10:18 AM   Odor Strong 12/22/2017 10:18 AM   Margins Defined edges 12/22/2017 10:18 AM   Kiah-wound Assessment Edema; Excoriated;Pink;Purple 12/22/2017 10:18 AM   Non-staged Wound Description 12/22/2017 10:18 AM   Wound Venous 12/22/2017 10:18 AM   Dressing Status Intact 9/15/2017  8:42 AM   Dressing Changed Changed/New 12/4/2017 11:24 AM   Dressing/Treatment Other (Comment) 12/22/2017 10:18 AM   Wound Cleansed Wound cleanser 12/22/2017 10:18 AM   Wound Length (cm) 8 cm 12/22/2017 10:18 AM   Wound Width (cm) 12.5 cm 12/22/2017 10:18 AM   Wound Depth (cm)  0.1 12/22/2017 10:18 AM   Calculated Wound Size (cm^2) (l*w) 100 cm^2 12/22/2017 10:18 AM   Change in Wound Size % (l*w) -263.64 12/22/2017 10:18 AM   Distance Tunneling (cm) 0 cm 12/22/2017 10:18 AM   Tunneling Position ___ O'Clock 0 11/27/2017 10:06 AM   Undermining Starts ___ O'Clock 0 11/27/2017 10:06 AM   Undermining Ends___ O'Clock 0 11/27/2017 10:06 AM   Undermining Maxium Distance (cm) 0 12/22/2017 10:18 AM   Wound Assessment Gray;Pink;Red;Slough; Yellow 12/22/2017 10:18 AM   Drainage Amount Copious 12/22/2017 10:18 AM   Drainage Description Serosanguinous 12/22/2017 10:18 AM   Odor Strong 12/22/2017 10:18 AM   Margins Undefined edges 12/22/2017 10:18 AM   Kiah-wound Assessment Edema;Pale;Pink;Maceration 12/22/2017 10:18 AM   Non-staged Wound Description Full thickness 12/22/2017 10:18 AM   [place]%Wound Bed 20 12/22/2017 10:18 AM   Red%Wound Bed 20 12/22/2017 10:18 AM   Yellow%Wound Bed 20 12/22/2017 10:18 AM   Black%Wound Bed 0 12/15/2017  9:51 AM   Purple%Wound Bed 0 5/8/2017 10:35 AM   Other%Wound Bed 40 -grey/brown 12/22/2017 10:18 AM   Number of days: 291       Percent of Wound Debrided: 50%    Total Surface Area Debrided:  136.5 sq cm    Non Pressure Ulcers only:  [] Limited to breakdown of the skin [x] With fat layer exposed  [] With necrosis of muscle  [] With unspecified severity  [] With necrosis of bone  []       Bleeding: Minimal    Hemostasis Achieved: by pressure    Procedural Pain: 0  / 10     Post Procedural Pain: 0 / 10     Response to treatment:  Well tolerated by patient.   Patient underwent successful dilatation of the vena cava at the [facility]. Hopefully will see some response in wound healing. Plan:     Treatment Note please see attached Discharge Instructions    Is this Patient a candidate for HBO: No    Written Patient Dismissal Instructions Given       Patient is to return to wound care center in:  1 week(s)    Discharge [facility] Physician Orders and Discharge Instructions  The [place] [id number]   [place], [facility] [address]  Telephone: [phone]    NAME:  Venita Cameron  YOB: 1965  MEDICAL RECORD NUMBER:  [id number]  DATE:  12/22/2017    Wash hands with soap and water prior to and after every dressing change. Wound Cleansing:   · Do not scrub or use excessive force. · With each dressing change, rinse wounds with 0.9% Saline. (May use wound wash or soft contact solution. Both can be purchased at a local drug store). · If unable to obtain saline, may use a gentle soap and water. · Keep wounds dry in the shower unless otherwise instructed by the physician. Topical Treatments:  Do not apply lotions, creams, or ointments to wound bed unless directed. [] Apply moisturizing lotion to skin surrounding the wound prior to dressing change.  [] Apply antifungal ointment to skin surrounding the wound prior to dressing change. [x] Apply thin film of moisture barrier ointment to skin immediately around wound. [x] Other: vashe wash gauze let sit on wound for 5 minutes with each dressing change.      Dressings:           Wound Location:  Right lower  Leg wounds     [x] Apply Primary Dressing to wound:       [] Foam/Foam with Border(i.e Mepilex) [] Non-adherent (i.e.Mepitel)   [] Alginate with Silver (i.e. Silvercel)   [] Alginate   [] Collagen (i.e. Puracol) [] Collagen with Silver(i.e. Candice)     [] Hydrocolloid  [] Hydrafera Blue moistened with saline   [] MediHoney Paste/Gel [] Hydrogel      [] Santyl with Moisten saline gauze    [] Bactroban/Mupirocin [] Polysporin  [x] Other: cutimed sorbact     Pack wound loosely with  [] Iodoform   [] Plain Packing  [] Other      [x] Cover and Secure with:     [] Gauze [x] ABD [] Stretch bandage roll [] Kerlix   [] Coban [] Ace Wrap [] Cover Roll Tape    [x] Other: optilock   Avoid contact of tape with skin. [x] Change dressing: [] Daily    [] Every Other Day [] Three times per week   [] Once a week [x] Do Not Change Dressing   [] Other:    Compression:  Type: Porfore- 4 layer wrap  ·  Multilayer Compression Wrap Applied in Clinic [x]Right Leg []Left Leg  · Do not get leg(s) with wrap wet. ·  If wraps become too tight call the center or completely remove the wrap. · Elevate leg(s) above the level of the heart when sitting. · Avoid prolonged standing in one place. [] [facility] remove wrap, cleanse affected leg(s) with soap and water, apply wound dressings as ordered above and reapply compression wraps on:     Off-Loading:   [] Off-loading when: [] walking  [] in bed [] sitting    [] Total non-weight bearing:  [] Right Leg  [] Left Leg     [] Partial weight bearing:   [] Right Leg  [] Left Leg    [] No weight bearing restrictions     [x] Assistive Device: [] Walker [] Crutches  [] Wheelchair [] Roll About   [x] Surgical shoe    [] Podus Boot(s)   [] Prevalon Boot(s)  [] Melissa Davenport    [] Cast/CAM Boot [] Other:      Dietary:  Important dietary reminders:  1. Increase Protein intake (i.e. Lean meats, fish, eggs, legumes, and yogurt)  2. No added salt  3. If diabetic, follow a diabetic diet and check glucose prior to meals or as instructed by your physician.         Return Appointment:  [] Wound and dressing supply provider:   [] ECF or Home Healthcare:  [x] Nurse visit: Next Wednesday 12/27/17  [x] Return Appointment: With Dr. Bruna Pedraza   in  1Week(s)    [x] Continue to use lymphedema pumps in 1 hour increments twice daily    Your nurse  is: Yesenia     Electronically signed by Kin Akhtar RN on 12/22/2017 at 7:54 AM     [address] [facility] [address] Information: Should you experience any significant changes in your wound(s) or have questions about your wound care, please contact the [address] [place] [address] at [phone] Monday and Wednesday 8:00 am - 2:00 pm and Tuesday, Thursday and Friday from 8:00 am - 4:30 pm.   If you need help with your wound outside these hours and cannot wait until we are again available, contact your PCP or go to the hospital emergency room. PLEASE NOTE: IF YOU ARE UNABLE TO OBTAIN WOUND SUPPLIES, CONTINUE TO USE THE SUPPLIES YOU HAVE AVAILABLE UNTIL YOU ARE ABLE TO REACH US. IT IS MOST IMPORTANT TO KEEP THE WOUND COVERED AT ALL TIMES. Physician orders by:     [] Dr Iggy Bee [] Dr Chrissy Alanis    [] Dr Kasey Belcher     [] Dr Ayanna Shabazz   [x] Dr Brittany Elkins  [] Dr Lara Edwards  [] Dr Kayce Cadena       Physician Signature:__________________________________        The information contained in the After Visit Summary has been reviewed with me, the patient and/or responsible adult, by my health care provider(s). I had the opportunity to ask questions regarding this information.   I have elected to receive;      [] Patient unable to sign Discharge Instructions given to ECF/Transportation/POA            Electronically signed by Josselyn Alejandro MD on 12/22/2017 at 12:45 PM

## 2017-12-27 ENCOUNTER — HOSPITAL ENCOUNTER (OUTPATIENT)
Dept: WOUND CARE | Age: 52
Discharge: HOME | End: 2017-12-27
Attending: SPECIALIST | Admitting: SPECIALIST

## 2017-12-27 ASSESSMENT — PAIN DESCRIPTION - ONSET: ONSET: ON-GOING

## 2017-12-27 ASSESSMENT — PAIN DESCRIPTION - PAIN TYPE: TYPE: ACUTE PAIN;CHRONIC PAIN

## 2017-12-27 ASSESSMENT — PAIN DESCRIPTION - ORIENTATION: ORIENTATION: RIGHT

## 2017-12-27 ASSESSMENT — PAIN SCALES - GENERAL: PAINLEVEL_OUTOF10: 8

## 2017-12-27 ASSESSMENT — PAIN DESCRIPTION - PROGRESSION: CLINICAL_PROGRESSION: NOT CHANGED

## 2017-12-27 ASSESSMENT — PAIN DESCRIPTION - LOCATION: LOCATION: LEG

## 2017-12-27 ASSESSMENT — PAIN DESCRIPTION - DESCRIPTORS: DESCRIPTORS: BURNING

## 2017-12-27 ASSESSMENT — PAIN DESCRIPTION - FREQUENCY: FREQUENCY: CONTINUOUS

## 2017-12-27 NOTE — PROGRESS NOTES
Multilayer Compression Wrap   (Not Unna) Below the Knee    NAME:  Flaca Michaud  YOB: 1965  MEDICAL RECORD NUMBER:  [id number]  DATE:  12/27/2017       [x] Removed old Multilayer wrap if indicated and wash leg with mild soap/water.  [x] Applied moisturizing agent to dry skin as needed.  [x] Applied primary and secondary dressing as ordered    [x] Applied multilayered dressing below the right knee to  Proforeper 's instructions.  [x] Instructed patient/caregiver not to remove dressing and to keep it clean and dry.  [x] Instructed patient/caregiver on complications to report to provider, such as pain, numbness in toes, heavy drainage, and slippage of dressing.  [x] Instructed patient on purpose of compression dressing and on activity and exercise recommendations.        Applied per   Guidelines    Electronically signed by Thanh Alas RN on 12/27/2017 at 2:18 PM

## 2017-12-29 ENCOUNTER — HOSPITAL ENCOUNTER (OUTPATIENT)
Dept: WOUND CARE | Age: 52
Discharge: HOME | End: 2017-12-29
Attending: SPECIALIST | Admitting: SPECIALIST

## 2017-12-29 VITALS
TEMPERATURE: 98 F | SYSTOLIC BLOOD PRESSURE: 134 MMHG | DIASTOLIC BLOOD PRESSURE: 80 MMHG | HEART RATE: 74 BPM | RESPIRATION RATE: 16 BRPM

## 2017-12-29 DIAGNOSIS — I82.423 THROMBOSIS OF BOTH ILIAC VEINS (HCC): Primary | ICD-10-CM

## 2017-12-29 DIAGNOSIS — I82.220 THROMBOSIS OF INFERIOR VENA CAVA (HCC): ICD-10-CM

## 2017-12-29 DIAGNOSIS — I89.0 CHRONIC ACQUIRED LYMPHEDEMA: ICD-10-CM

## 2017-12-29 DIAGNOSIS — I87.311 IDIOPATHIC CHRONIC VENOUS HYPERTENSION OF RIGHT LOWER EXTREMITY WITH ULCER (HCC): ICD-10-CM

## 2017-12-29 DIAGNOSIS — L97.919 IDIOPATHIC CHRONIC VENOUS HYPERTENSION OF RIGHT LOWER EXTREMITY WITH ULCER (HCC): ICD-10-CM

## 2017-12-29 PROCEDURE — 11045 DBRDMT SUBQ TISS EACH ADDL: CPT | Performed by: SPECIALIST

## 2017-12-29 PROCEDURE — 11042 DBRDMT SUBQ TIS 1ST 20SQCM/<: CPT | Performed by: SPECIALIST

## 2017-12-29 PROCEDURE — 99999 PR OFFICE/OUTPT VISIT,PROCEDURE ONLY: CPT | Performed by: SPECIALIST

## 2017-12-29 RX ORDER — LIDOCAINE HYDROCHLORIDE 40 MG/ML
2.5 SOLUTION TOPICAL ONCE
Status: DISCONTINUED | OUTPATIENT
Start: 2017-12-29 | End: 2017-12-30 | Stop reason: HOSPADM

## 2017-12-29 ASSESSMENT — PAIN DESCRIPTION - ORIENTATION: ORIENTATION: RIGHT

## 2017-12-29 ASSESSMENT — PAIN DESCRIPTION - LOCATION: LOCATION: LEG

## 2017-12-29 ASSESSMENT — PAIN SCALES - GENERAL: PAINLEVEL_OUTOF10: 10

## 2017-12-29 ASSESSMENT — PAIN DESCRIPTION - PAIN TYPE: TYPE: ACUTE PAIN;CHRONIC PAIN

## 2017-12-29 NOTE — PROGRESS NOTES
Description Full thickness 12/29/2017 10:36 AM   Pink%Wound Bed 10 12/29/2017 10:36 AM   Red%Wound Bed 80 12/29/2017 10:36 AM   Yellow%Wound Bed 10 12/29/2017 10:36 AM   Black%Wound Bed 0 12/15/2017  9:51 AM   Purple%Wound Bed 00 5/8/2017 10:35 AM   Other%Wound Bed 0 12/15/2017  9:51 AM   Number of days: 298       Wound 03/06/17 Leg Right; Lower;Medial #2 venous/ lymphedema (stage 3)- combined with wound #3 (Active)   Wound Type Wound 12/29/2017 10:36 AM   Wound Venous 12/22/2017 10:18 AM   Dressing Status Intact 8/16/2017  9:04 AM   Dressing Changed Changed/New 12/4/2017 11:24 AM   Dressing/Treatment Other (Comment) 12/29/2017 10:36 AM   Wound Cleansed Rinsed/Irrigated with saline 12/29/2017 10:36 AM   Wound Length (cm) 15 cm 12/29/2017 10:36 AM   Wound Width (cm) 18 cm 12/29/2017 10:36 AM   Wound Depth (cm)  0.1 12/29/2017 10:36 AM   Calculated Wound Size (cm^2) (l*w) 270 cm^2 12/29/2017 10:36 AM   Change in Wound Size % (l*w) -561.76 12/29/2017 10:36 AM   Distance Tunneling (cm) 0 cm 12/29/2017 10:36 AM   Tunneling Position ___ O'Clock 0 12/1/2017 10:07 AM   Undermining Starts ___ O'Clock 0 12/1/2017 10:07 AM   Undermining Ends___ O'Clock 0 12/1/2017 10:07 AM   Undermining Maxium Distance (cm) 0 12/29/2017 10:36 AM   Wound Assessment Pink;Red;Yellow;Bleeding 12/29/2017 10:36 AM   Drainage Amount Copious 12/29/2017 10:36 AM   Drainage Description Brown;Serosanguinous; Yellow 12/29/2017 10:36 AM   Odor Strong 12/29/2017 10:36 AM   Margins Undefined edges 12/29/2017 10:36 AM   Kiah-wound Assessment Pink; Maceration;Edema 12/29/2017 10:36 AM   Non-staged Wound Description Full thickness 12/29/2017 10:36 AM   Pink%Wound Bed 10 12/29/2017 10:36 AM   Red%Wound Bed 60 12/29/2017 10:36 AM   Yellow%Wound Bed 30 12/29/2017 10:36 AM   Black%Wound Bed 0 12/27/2017  8:51 AM   Purple%Wound Bed 0 5/8/2017 10:35 AM   Other%Wound Bed 0 12/15/2017  9:51 AM   Number of days: 298       Percent of Wound Debrided: 50%    Total Surface Area Debrided:  162 sq cm    Non Pressure Ulcers only:  [] Limited to breakdown of the skin [x] With fat layer exposed  [] With necrosis of muscle  [] With unspecified severity  [] With necrosis of bone  []       Bleeding: Minimal    Hemostasis Achieved: by pressure    Procedural Pain: 1  / 10     Post Procedural Pain: 0 / 10     Response to treatment:  Well tolerated by patient. There has been no improvement since procedure on vena cava at the [facility]. He is not interested in taking leave of absence from work to try bedrest and elevation to improve edema. States he continues with lymphatic pumps. Plan:     Treatment Note please see attached Discharge Instructions    Is this Patient a candidate for HBO: No    Written Patient Dismissal Instructions Given       Patient is to return to wound care center in:  1 week(s)    Discharge [facility] Physician Orders and Discharge Instructions  The [address] [facility] [address]  Telephone: [phone]    NAME:  Jose iVeira  YOB: 1965  MEDICAL RECORD NUMBER:  [id number]  DATE:  12/29/2017    Wash hands with soap and water prior to and after every dressing change. Wound Cleansing:   · Do not scrub or use excessive force. · With each dressing change, rinse wounds with 0.9% Saline. (May use wound wash or soft contact solution. Both can be purchased at a local drug store). · If unable to obtain saline, may use a gentle soap and water. · Keep wounds dry in the shower unless otherwise instructed by the physician. Topical Treatments:  Do not apply lotions, creams, or ointments to wound bed unless directed. [] Apply moisturizing lotion to skin surrounding the wound prior to dressing change.  [] Apply antifungal ointment to skin surrounding the wound prior to dressing change.   [x] Apply thin film of moisture barrier ointment to skin immediately around wound- [employer]are  [] Other:       Dressings:           Wound Location:right lower leg wounds     [x] Apply Primary Dressing to wound:       [] Foam/Foam with Border(i.e Mepilex) [] Non-adherent (i.e.Mepitel)   [] Alginate with Silver(i.e. Silvercel) [] Alginate   [] Collagen(i.e. Puracol) [] Collagen with Silver(i.e. Silvercel)    [] Hydrocolloid  [] Hydrafera Blue moistened with saline   [] MediHoney Paste/Gel [] Hydrogel      [] Santyl with Moisten saline gauze   [] Bactroban/Mupirocin [] Polysporin  [x] Other: cutimed sorbact, cover with optilock, abd pads, profore   Pack wound loosely with  [] Iodoform   [] Plain Packing  [] Other      [x] Cover and Secure with:     [] Gauze [] ABD [] Stretch bandage roll [] Kerlix   [] Coban [] Ace Wrap [] Cover Roll Tape    [] Other:    Avoid contact of tape with skin. [x] Change dressing:  [] Daily    [] Every Other Day [] Three times per week   [] Once a week [x] Do Not Change Dressing- patient may change dressing if needed   [] Other:          Compression:  Type: profore 4 layer wrap  ·  Multilayer Compression Wrap Applied in Clinic [x]Right Leg []Left Leg  · Do not get leg(s) with wrap wet. ·  If wraps become too tight call the center or completely remove the wrap. · Elevate leg(s) above the level of the heart when sitting. · Avoid prolonged standing in one place.       [] [address] [place] [address] remove wrap, cleanse affected leg(s) with soap and water, apply wound dressings as ordered above and reapply compression wraps on:     Off-Loading:   [] Off-loading when: [] walking  [] in bed [] sitting    [] Total non-weight bearing:  [] Right Leg  [] Left Leg     [] Partial weight bearing:   [] Right Leg  [] Left Leg    [] No weight bearing restrictions     [x] Assistive Device: [] Walker [] Crutches  [] Wheelchair [] Roll About   [x] Surgical shoe    [] Podus Boot(s)   [] Prevalon Boot(s)  [] Sergio Bautista    [] Cast/CAM Ilda []

## 2018-01-03 ENCOUNTER — HOSPITAL ENCOUNTER (OUTPATIENT)
Dept: WOUND CARE | Age: 53
Discharge: HOME | End: 2018-01-03
Attending: SPECIALIST | Admitting: SPECIALIST

## 2018-01-03 VITALS
HEART RATE: 94 BPM | DIASTOLIC BLOOD PRESSURE: 79 MMHG | TEMPERATURE: 98.1 F | RESPIRATION RATE: 16 BRPM | SYSTOLIC BLOOD PRESSURE: 128 MMHG

## 2018-01-03 ASSESSMENT — PAIN DESCRIPTION - PROGRESSION: CLINICAL_PROGRESSION: GRADUALLY WORSENING

## 2018-01-03 ASSESSMENT — PAIN DESCRIPTION - DESCRIPTORS: DESCRIPTORS: BURNING

## 2018-01-03 ASSESSMENT — PAIN DESCRIPTION - ONSET: ONSET: ON-GOING

## 2018-01-03 ASSESSMENT — PAIN DESCRIPTION - FREQUENCY: FREQUENCY: CONTINUOUS

## 2018-01-03 ASSESSMENT — PAIN SCALES - GENERAL: PAINLEVEL_OUTOF10: 8

## 2018-01-03 ASSESSMENT — PAIN DESCRIPTION - PAIN TYPE: TYPE: ACUTE PAIN;CHRONIC PAIN

## 2018-01-03 ASSESSMENT — PAIN DESCRIPTION - LOCATION: LOCATION: LEG

## 2018-01-03 ASSESSMENT — PAIN DESCRIPTION - ORIENTATION: ORIENTATION: RIGHT

## 2018-01-05 ENCOUNTER — HOSPITAL ENCOUNTER (OUTPATIENT)
Dept: WOUND CARE | Age: 53
Discharge: HOME | End: 2018-01-05
Attending: SPECIALIST | Admitting: SPECIALIST

## 2018-01-05 ENCOUNTER — TELEPHONE (OUTPATIENT)
Dept: FAMILY MEDICINE CLINIC | Age: 53
End: 2018-01-05

## 2018-01-05 VITALS
SYSTOLIC BLOOD PRESSURE: 131 MMHG | TEMPERATURE: 98 F | RESPIRATION RATE: 16 BRPM | HEART RATE: 82 BPM | DIASTOLIC BLOOD PRESSURE: 84 MMHG

## 2018-01-05 PROCEDURE — 11042 DBRDMT SUBQ TIS 1ST 20SQCM/<: CPT | Performed by: SPECIALIST

## 2018-01-05 PROCEDURE — 11045 DBRDMT SUBQ TISS EACH ADDL: CPT | Performed by: SPECIALIST

## 2018-01-05 RX ORDER — CIPROFLOXACIN 500 MG/1
500 TABLET, FILM COATED ORAL 2 TIMES DAILY
Qty: 20 TABLET | Refills: 0 | Status: SHIPPED | OUTPATIENT
Start: 2018-01-05 | End: 2018-01-15

## 2018-01-05 RX ORDER — CIPROFLOXACIN 500 MG/1
500 TABLET, FILM COATED ORAL 2 TIMES DAILY
COMMUNITY
End: 2018-01-26 | Stop reason: ALTCHOICE

## 2018-01-05 RX ORDER — LIDOCAINE HYDROCHLORIDE 40 MG/ML
2.5 SOLUTION TOPICAL ONCE
Status: COMPLETED | OUTPATIENT
Start: 2018-01-05 | End: 2018-01-05

## 2018-01-05 RX ADMIN — LIDOCAINE HYDROCHLORIDE 2.5 ML: 40 SOLUTION TOPICAL at 10:15

## 2018-01-05 ASSESSMENT — PAIN DESCRIPTION - PROGRESSION: CLINICAL_PROGRESSION: NOT CHANGED

## 2018-01-05 ASSESSMENT — PAIN DESCRIPTION - FREQUENCY: FREQUENCY: CONTINUOUS

## 2018-01-05 ASSESSMENT — PAIN DESCRIPTION - LOCATION: LOCATION: LEG

## 2018-01-05 ASSESSMENT — PAIN DESCRIPTION - DESCRIPTORS: DESCRIPTORS: BURNING

## 2018-01-05 ASSESSMENT — PAIN DESCRIPTION - ONSET: ONSET: ON-GOING

## 2018-01-05 ASSESSMENT — PAIN DESCRIPTION - ORIENTATION: ORIENTATION: RIGHT

## 2018-01-05 ASSESSMENT — PAIN DESCRIPTION - PAIN TYPE: TYPE: CHRONIC PAIN

## 2018-01-05 ASSESSMENT — PAIN SCALES - GENERAL: PAINLEVEL_OUTOF10: 7

## 2018-01-05 NOTE — PROGRESS NOTES
10:07 AM   [place]%Wound Bed 10 12/29/2017 10:36 AM   Red%Wound Bed 10 1/5/2018 10:07 AM   Yellow%Wound Bed 30 1/3/2018  8:30 AM   Black%Wound Bed 0 12/15/2017  9:51 AM   Purple%Wound Bed 00 5/8/2017 10:35 AM   Other%Wound Bed 90% green brown 1/5/2018 10:07 AM   Number of days: 305       Wound 03/06/17 Leg Right; Lower;Medial #2 venous/ lymphedema (stage 3)- combined with wound #3 (Active)   Wound Type Wound 1/5/2018 10:07 AM   Wound Venous 12/22/2017 10:18 AM   Dressing Status Intact 8/16/2017  9:04 AM   Dressing Changed Changed/New 12/4/2017 11:24 AM   Dressing/Treatment Alginate;ABD;Dry dressing 1/5/2018 11:14 AM   Wound Cleansed Rinsed/Irrigated with saline 1/5/2018 10:07 AM   Wound Length (cm) 14.4 cm 1/5/2018 10:07 AM   Wound Width (cm) 16.2 cm 1/5/2018 10:07 AM   Wound Depth (cm)  0.1 1/5/2018 10:07 AM   Calculated Wound Size (cm^2) (l*w) 233.28 cm^2 1/5/2018 10:07 AM   Change in Wound Size % (l*w) -471.76 1/5/2018 10:07 AM   Distance Tunneling (cm) 0 cm 1/5/2018 10:07 AM   Tunneling Position ___ O'Clock 0 12/1/2017 10:07 AM   Undermining Starts ___ O'Clock 0 12/1/2017 10:07 AM   Undermining Ends___ O'Clock 0 12/1/2017 10:07 AM   Undermining Maxium Distance (cm) 0 1/5/2018 10:07 AM   Wound Assessment Red 1/5/2018 10:07 AM   Drainage Amount Copious 1/5/2018 10:07 AM   Drainage Description Brown;Serosanguinous; Yellow 1/5/2018 10:07 AM   Odor Strong 1/5/2018 10:07 AM   Margins Undefined edges 1/5/2018 10:07 AM   Kiah-wound Assessment Pink; Maceration;Edema 1/5/2018 10:07 AM   Non-staged Wound Description Full thickness 1/5/2018 10:07 AM   [place]%Wound Bed 0 1/5/2018 10:07 AM   Red%Wound Bed 5 1/5/2018 10:07 AM   Yellow%Wound Bed 0 1/5/2018 10:07 AM   Black%Wound Bed 0 1/5/2018 10:07 AM   Purple%Wound Bed 0 1/5/2018 10:07 AM   Other%Wound Bed 95 1/5/2018 10:07 AM   Number of days: 305       Percent of Wound Debrided: 50%    Total Surface Area Debrided:  144.5 sq cm    Non Pressure Ulcers only:  [] Limited to breakdown antifungal ointment to skin surrounding the wound prior to dressing change.  [] Apply thin film of moisture barrier ointment to skin immediately around wound. [] Other:        Dressings:           Wound Location: right lower leg wounds     [x] Apply Primary Dressing to wound:       [] Foam/Foam with Border(i.e Mepilex) [] Non-adherent (i.e.Mepitel)   [] Alginate with Silver(i.e. Silvercel) [x] Alginate   [] Collagen(i.e. Puracol) [] Collagen with Silver(i.e. Silvercel)    [] Hydrocolloid  [] Hydrafera Blue moistened with saline   [] MediHoney Paste/Gel [] Hydrogel      [] Santyl with Moisten saline gauze   [] Bactroban/Mupirocin [] Polysporin  [] Other:      Pack wound loosely with  [] Iodoform   [] Plain Packing  [] Other      [x] Cover and Secure with:     [] Gauze [x] ABD [] Stretch bandage roll [x] Kerlix   [] Coban [] Ace Wrap [] Cover Roll Tape    [x] Other: optilock   Avoid contact of tape with skin. [x] Change dressing:  [] Daily    [] Every Other Day [] Three times per week   [] Once a week [x] Do Not Change Dressing   [] Other:        Compression:  Type:profore 4 layer wrap  ·  Multilayer Compression Wrap Applied in Clinic [x]Right Leg []Left Leg  · Do not get leg(s) with wrap wet. ·  If wraps become too tight call the center or completely remove the wrap. · Elevate leg(s) above the level of the heart when sitting. · Avoid prolonged standing in one place.       [] [address] [place] [address] remove wrap, cleanse affected leg(s) with soap and water, apply wound dressings as ordered above and reapply compression wraps on:     Off-Loading:   [] Off-loading when: [] walking  [] in bed [] sitting    [] Total non-weight bearing:  [] Right Leg  [] Left Leg     [] Partial weight bearing:   [] Right Leg  [] Left Leg    [] No weight bearing restrictions     [x] Assistive Device: [] Walker [] Crutches  [] Wheelchair [] Roll About   [x] Surgical shoe [] Podus Boot(s)   [] Prevalon Boot(s)  [] Karen Almaraz [] Cast/CAM Boot [] Other:  ·     Dietary:  Important dietary reminders:  1. Increase Protein intake (i.e. Lean meats, fish, eggs, legumes, and yogurt)  2. No added salt  3. If diabetic, follow a diabetic diet and check glucose prior to meals or as instructed by your physician. Wound Culture taken and patient started on Cipro. Return Appointment:  [] Wound and dressing supply provider:   [] ECF or Home Healthcare:  [x] Nurse visit:  Nurse visit on Monday  [x] Return Appointment: With Dr. George Spann  in 1 Week(s)    Continue to use lymphedema pumps in 1 hour increments twice daily    Your nurse  is:  Rob Jovel     Electronically signed by Sterling Lantigua RN on 1/5/2018 at 10:31 AM     [address] [facility] Information: Should you experience any significant changes in your wound(s) or have questions about your wound care, please contact the [address] [place] [address] at [phone] Monday and Wednesday 8:00 am - 2:00 pm and Tuesday, Thursday and Friday from 8:00 am - 4:30 pm.   If you need help with your wound outside these hours and cannot wait until we are again available, contact your PCP or go to the hospital emergency room. PLEASE NOTE: IF YOU ARE UNABLE TO OBTAIN WOUND SUPPLIES, CONTINUE TO USE THE SUPPLIES YOU HAVE AVAILABLE UNTIL YOU ARE ABLE TO REACH US. IT IS MOST IMPORTANT TO KEEP THE WOUND COVERED AT ALL TIMES. Physician orders by:     [] Dr Polo Boast [] Dr Tono Viera    [] Dr Tati Etienne     [] Dr Anayeli Morales   [x] Dr Fawn Kaufman  [] Dr Rahul Htuson  [] Dr Sara Alejandro       Physician Signature:__________________________________        The information contained in the After Visit Summary has been reviewed with me, the patient and/or responsible adult, by my health care provider(s). I had the opportunity to ask questions regarding this information.   I have elected to receive;      [] Patient unable to sign Discharge Instructions given to

## 2018-01-05 NOTE — TELEPHONE ENCOUNTER
Dr. Mobley Innocent contact me during the day with concerns about the slow progression on the healing of the right leg with 3 times a week wound clinic sessions. Wound clinic sessions have dated since March 2017. Patient was seen at [facility] for procedure. We had a discussion whether it may benefit patient for direct admission with consultation and infectious disease and vascular surgeon. I told Dr. Hoda Dietz and will gauge my patient's readiness of inpatient care. I called patient's phone. Patient's son Cristian answer the phone and his father Bibi Stewart is not available today (Friday). His father will be available Monday morning.  I will call before 8:30 am.

## 2018-01-07 LAB
GRAM STAIN RESULT: ABNORMAL
ORGANISM: ABNORMAL
ORGANISM: ABNORMAL
WOUND/ABSCESS: ABNORMAL

## 2018-01-08 ENCOUNTER — HOSPITAL ENCOUNTER (OUTPATIENT)
Dept: WOUND CARE | Age: 53
Discharge: HOME | End: 2018-01-09
Attending: SPECIALIST | Admitting: SPECIALIST

## 2018-01-08 VITALS
SYSTOLIC BLOOD PRESSURE: 141 MMHG | TEMPERATURE: 97.8 F | HEART RATE: 70 BPM | RESPIRATION RATE: 16 BRPM | DIASTOLIC BLOOD PRESSURE: 85 MMHG

## 2018-01-08 ASSESSMENT — PAIN DESCRIPTION - PAIN TYPE: TYPE: CHRONIC PAIN

## 2018-01-08 ASSESSMENT — PAIN SCALES - GENERAL: PAINLEVEL_OUTOF10: 6

## 2018-01-08 ASSESSMENT — PAIN DESCRIPTION - ORIENTATION: ORIENTATION: RIGHT

## 2018-01-08 ASSESSMENT — PAIN DESCRIPTION - DESCRIPTORS: DESCRIPTORS: BURNING

## 2018-01-08 ASSESSMENT — PAIN DESCRIPTION - LOCATION: LOCATION: LEG

## 2018-01-08 ASSESSMENT — PAIN DESCRIPTION - FREQUENCY: FREQUENCY: CONTINUOUS

## 2018-01-08 ASSESSMENT — PAIN DESCRIPTION - PROGRESSION: CLINICAL_PROGRESSION: NOT CHANGED

## 2018-01-08 ASSESSMENT — PAIN DESCRIPTION - ONSET: ONSET: ON-GOING

## 2018-01-09 NOTE — TELEPHONE ENCOUNTER
I call back patient. [facility]. He does not have a strong opinion whether it was worth proceeding with the procedure are not. I voiced my concern to him that after the Dr. Lv Huntley that the progress on the healing of his lower extremity has been slow and that he could benefit from hospitalization to engage in a multidisciplinary approach seeing a vascular specialist infectious disease doctor and continuing with wound care. Patient tells me it would be difficult to stop working since he needs the money for the financnes to support his family. [facility] but he assumes that this is covered by his insurance plan. He would prefer to see the specialists in clinic appointments (referrals) than in the hospital.    He plans to see the wound clinic 8:30 am tomorrow. I told him that I would like to touch base with Dr. Lv Huntley and then get back to the patient on the next steps.

## 2018-01-10 ENCOUNTER — HOSPITAL ENCOUNTER (OUTPATIENT)
Dept: WOUND CARE | Age: 53
Discharge: HOME | End: 2018-01-10
Attending: SPECIALIST | Admitting: SPECIALIST

## 2018-01-10 VITALS
SYSTOLIC BLOOD PRESSURE: 158 MMHG | RESPIRATION RATE: 16 BRPM | DIASTOLIC BLOOD PRESSURE: 84 MMHG | HEART RATE: 73 BPM | TEMPERATURE: 98.1 F

## 2018-01-10 ASSESSMENT — PAIN DESCRIPTION - ORIENTATION: ORIENTATION: RIGHT

## 2018-01-10 ASSESSMENT — PAIN DESCRIPTION - ONSET: ONSET: ON-GOING

## 2018-01-10 ASSESSMENT — PAIN DESCRIPTION - PAIN TYPE: TYPE: ACUTE PAIN

## 2018-01-10 ASSESSMENT — PAIN DESCRIPTION - DESCRIPTORS: DESCRIPTORS: BURNING

## 2018-01-10 ASSESSMENT — PAIN DESCRIPTION - FREQUENCY: FREQUENCY: CONTINUOUS

## 2018-01-10 ASSESSMENT — PAIN DESCRIPTION - LOCATION: LOCATION: LEG

## 2018-01-10 ASSESSMENT — PAIN DESCRIPTION - PROGRESSION: CLINICAL_PROGRESSION: NOT CHANGED

## 2018-01-10 ASSESSMENT — PAIN SCALES - GENERAL: PAINLEVEL_OUTOF10: 6

## 2018-01-10 NOTE — PROGRESS NOTES
Multilayer Compression Wrap   (Not Unna) Below the Knee    NAME:  Osiris Carr  YOB: 1965  MEDICAL RECORD NUMBER:  [id number]  DATE:  1/10/2018       [x] Removed old Multilayer wrap if indicated and wash leg with mild soap/water.  [x] Applied moisturizing agent to dry skin as needed.  [x] Applied primary and secondary dressing as ordered    [x] Applied multilayered dressing below the right   knee to   Profor) per 's instructions.  [x] Instructed patient/caregiver not to remove dressing and to keep it clean and dry.  [x] Instructed patient/caregiver on complications to report to provider, such as pain, numbness in toes, heavy drainage, and slippage of dressing.  [x] Instructed patient on purpose of compression dressing and on activity and exercise recommendations.        Applied per   Guidelines    Electronically signed by Ramsey Anthony RN on 1/10/2018 at 8:51 AM

## 2018-01-12 ENCOUNTER — HOSPITAL ENCOUNTER (OUTPATIENT)
Dept: WOUND CARE | Age: 53
Discharge: HOME | End: 2018-01-12
Attending: SPECIALIST | Admitting: SPECIALIST

## 2018-01-12 VITALS
TEMPERATURE: 98 F | SYSTOLIC BLOOD PRESSURE: 145 MMHG | RESPIRATION RATE: 16 BRPM | HEART RATE: 66 BPM | DIASTOLIC BLOOD PRESSURE: 91 MMHG

## 2018-01-12 DIAGNOSIS — I87.311 IDIOPATHIC CHRONIC VENOUS HYPERTENSION OF RIGHT LOWER EXTREMITY WITH ULCER (HCC): Primary | ICD-10-CM

## 2018-01-12 DIAGNOSIS — R60.0 LOCALIZED EDEMA: ICD-10-CM

## 2018-01-12 DIAGNOSIS — I89.0 CHRONIC ACQUIRED LYMPHEDEMA: ICD-10-CM

## 2018-01-12 DIAGNOSIS — L97.919 IDIOPATHIC CHRONIC VENOUS HYPERTENSION OF RIGHT LOWER EXTREMITY WITH ULCER (HCC): Primary | ICD-10-CM

## 2018-01-12 DIAGNOSIS — I82.423 THROMBOSIS OF BOTH ILIAC VEINS (HCC): ICD-10-CM

## 2018-01-12 DIAGNOSIS — I82.220 THROMBOSIS OF INFERIOR VENA CAVA (HCC): ICD-10-CM

## 2018-01-12 PROCEDURE — 11045 DBRDMT SUBQ TISS EACH ADDL: CPT | Performed by: SPECIALIST

## 2018-01-12 PROCEDURE — 11042 DBRDMT SUBQ TIS 1ST 20SQCM/<: CPT | Performed by: SPECIALIST

## 2018-01-12 RX ORDER — LIDOCAINE HYDROCHLORIDE 40 MG/ML
2.5 SOLUTION TOPICAL ONCE
Status: COMPLETED | OUTPATIENT
Start: 2018-01-12 | End: 2018-01-12

## 2018-01-12 RX ADMIN — LIDOCAINE HYDROCHLORIDE 2.5 ML: 40 SOLUTION TOPICAL at 10:10

## 2018-01-12 ASSESSMENT — PAIN DESCRIPTION - ORIENTATION: ORIENTATION: RIGHT

## 2018-01-12 ASSESSMENT — PAIN DESCRIPTION - FREQUENCY: FREQUENCY: CONTINUOUS

## 2018-01-12 ASSESSMENT — PAIN DESCRIPTION - LOCATION: LOCATION: LEG

## 2018-01-12 ASSESSMENT — PAIN DESCRIPTION - ONSET: ONSET: ON-GOING

## 2018-01-12 ASSESSMENT — PAIN DESCRIPTION - DESCRIPTORS: DESCRIPTORS: BURNING

## 2018-01-12 ASSESSMENT — PAIN DESCRIPTION - PAIN TYPE: TYPE: ACUTE PAIN

## 2018-01-12 ASSESSMENT — PAIN SCALES - GENERAL: PAINLEVEL_OUTOF10: 5

## 2018-01-12 NOTE — PROGRESS NOTES
Multilayer Compression Wrap   (Not Unna) Below the Knee    NAME:  Lisbet Fernandez  YOB: 1965  MEDICAL RECORD NUMBER:  [id number]  DATE:  1/12/2018       [x] Removed old Multilayer wrap if indicated and wash leg with mild soap/water.  [x] Applied moisturizing agent to dry skin as needed.  [x] Applied primary and secondary dressing as ordered    [x] Applied multilayered dressing below the right  knee to   Profore per 's instructions.  [x] Instructed patient/caregiver not to remove dressing and to keep it clean and dry.  [x] Instructed patient/caregiver on complications to report to provider, such as pain, numbness in toes, heavy drainage, and slippage of dressing.  [x] Instructed patient on purpose of compression dressing and on activity and exercise recommendations.        Applied per   Guidelines    Electronically signed by Ayana Thompson RN on 1/12/2018 at 11:40 AM
Well tolerated by patient. Patient refuses admission to the hospital. Will get CT scan to evaluate angioplasty performed at the [facility] in [place]. Plan:     Treatment Note please see attached Discharge Instructions    Is this Patient a candidate for HBO: No    Written Patient Dismissal Instructions Given       Patient is to return to wound care center in:  1 week(s)    Discharge 200 [facility] Physician Orders and Discharge Instructions  The [address] [facility] [address]  Telephone: [phone]    NAME:  Gama Mac  YOB: 1965  MEDICAL RECORD NUMBER:  [id number]  DATE:  1/12/2018    Wash hands with soap and water prior to and after every dressing change. Wound Cleansing:   · Do not scrub or use excessive force. · With each dressing change, rinse wounds with 0.9% Saline. (May use wound wash or soft contact solution. Both can be purchased at a local drug store). · If unable to obtain saline, may use a gentle soap and water. · Keep wounds dry in the shower unless otherwise instructed by the physician. Topical Treatments:  Do not apply lotions, creams, or ointments to wound bed unless directed. [] Apply moisturizing lotion to skin surrounding the wound prior to dressing change.  [] Apply antifungal ointment to skin surrounding the wound prior to dressing change.  [] Apply thin film of moisture barrier ointment to skin immediately around wound.   [x] Other:  Vashe wash soaked gauze on wounds let stand 5 minutes before changing dressing       Dressings:           Wound Location: right lower leg wound     [x] Apply Primary Dressing to wound:       [] Foam/Foam with Border(i.e Mepilex) [] Non-adherent (i.e.Mepitel)   [x] Alginate with Silver(i.e. Silvercel) [] Alginate   [] Collagen(i.e. Puracol) [] Collagen with Silver(i.e. Silvercel)    [] Hydrocolloid  [] Hydrafera Blue moistened with

## 2018-01-15 ENCOUNTER — HOSPITAL ENCOUNTER (OUTPATIENT)
Dept: WOUND CARE | Age: 53
Discharge: HOME | End: 2018-01-15
Attending: SPECIALIST | Admitting: SPECIALIST

## 2018-01-15 VITALS
TEMPERATURE: 98 F | HEART RATE: 80 BPM | SYSTOLIC BLOOD PRESSURE: 157 MMHG | RESPIRATION RATE: 16 BRPM | DIASTOLIC BLOOD PRESSURE: 88 MMHG

## 2018-01-15 ASSESSMENT — PAIN DESCRIPTION - FREQUENCY: FREQUENCY: CONTINUOUS

## 2018-01-15 ASSESSMENT — PAIN DESCRIPTION - DESCRIPTORS: DESCRIPTORS: BURNING

## 2018-01-15 ASSESSMENT — PAIN DESCRIPTION - ORIENTATION: ORIENTATION: RIGHT

## 2018-01-15 ASSESSMENT — PAIN DESCRIPTION - LOCATION: LOCATION: LEG

## 2018-01-15 ASSESSMENT — PAIN SCALES - GENERAL: PAINLEVEL_OUTOF10: 3

## 2018-01-15 ASSESSMENT — PAIN DESCRIPTION - PAIN TYPE: TYPE: ACUTE PAIN

## 2018-01-15 ASSESSMENT — PAIN DESCRIPTION - ONSET: ONSET: ON-GOING

## 2018-01-15 ASSESSMENT — PAIN DESCRIPTION - PROGRESSION: CLINICAL_PROGRESSION: NOT CHANGED

## 2018-01-19 ENCOUNTER — HOSPITAL ENCOUNTER (OUTPATIENT)
Dept: WOUND CARE | Age: 53
Discharge: HOME | End: 2018-01-19
Attending: SPECIALIST | Admitting: SPECIALIST

## 2018-01-19 ENCOUNTER — TELEPHONE (OUTPATIENT)
Dept: WOUND CARE | Age: 53
End: 2018-01-19

## 2018-01-19 ENCOUNTER — HOSPITAL ENCOUNTER (OUTPATIENT)
Dept: CT IMAGING | Age: 53
Discharge: HOME | End: 2018-01-19
Attending: SPECIALIST | Admitting: SPECIALIST

## 2018-01-19 VITALS
DIASTOLIC BLOOD PRESSURE: 82 MMHG | RESPIRATION RATE: 16 BRPM | TEMPERATURE: 98 F | SYSTOLIC BLOOD PRESSURE: 130 MMHG | HEART RATE: 71 BPM

## 2018-01-19 DIAGNOSIS — I89.0 CHRONIC ACQUIRED LYMPHEDEMA: ICD-10-CM

## 2018-01-19 DIAGNOSIS — L97.919 IDIOPATHIC CHRONIC VENOUS HYPERTENSION OF RIGHT LOWER EXTREMITY WITH ULCER (HCC): ICD-10-CM

## 2018-01-19 DIAGNOSIS — I87.311 IDIOPATHIC CHRONIC VENOUS HYPERTENSION OF RIGHT LOWER EXTREMITY WITH ULCER (HCC): ICD-10-CM

## 2018-01-19 DIAGNOSIS — I82.5Z3 LOWER LEG DVT (DEEP VENOUS THROMBOEMBOLISM), CHRONIC, BILATERAL (HCC): ICD-10-CM

## 2018-01-19 DIAGNOSIS — I82.423 THROMBOSIS OF BOTH ILIAC VEINS (HCC): ICD-10-CM

## 2018-01-19 DIAGNOSIS — I82.220 THROMBOSIS OF INFERIOR VENA CAVA (HCC): Primary | ICD-10-CM

## 2018-01-19 DIAGNOSIS — R60.0 LOCALIZED EDEMA: ICD-10-CM

## 2018-01-19 LAB
INR BLD: 1.8 (ref 0.85–1.15)
PROTHROMBIN TIME: 20.3 SEC (ref 9.6–13)

## 2018-01-19 PROCEDURE — 11042 DBRDMT SUBQ TIS 1ST 20SQCM/<: CPT | Performed by: SPECIALIST

## 2018-01-19 PROCEDURE — 11045 DBRDMT SUBQ TISS EACH ADDL: CPT | Performed by: SPECIALIST

## 2018-01-19 RX ORDER — LIDOCAINE HYDROCHLORIDE 40 MG/ML
2.5 SOLUTION TOPICAL ONCE
Status: COMPLETED | OUTPATIENT
Start: 2018-01-19 | End: 2018-01-19

## 2018-01-19 RX ADMIN — LIDOCAINE HYDROCHLORIDE 2.5 ML: 40 SOLUTION TOPICAL at 09:15

## 2018-01-19 ASSESSMENT — PAIN DESCRIPTION - LOCATION: LOCATION: LEG

## 2018-01-19 ASSESSMENT — PAIN DESCRIPTION - FREQUENCY: FREQUENCY: CONTINUOUS

## 2018-01-19 ASSESSMENT — PAIN SCALES - GENERAL: PAINLEVEL_OUTOF10: 3

## 2018-01-19 ASSESSMENT — PAIN DESCRIPTION - PROGRESSION: CLINICAL_PROGRESSION: NOT CHANGED

## 2018-01-19 ASSESSMENT — PAIN DESCRIPTION - PAIN TYPE: TYPE: ACUTE PAIN

## 2018-01-19 ASSESSMENT — PAIN DESCRIPTION - DESCRIPTORS: DESCRIPTORS: BURNING

## 2018-01-19 ASSESSMENT — PAIN DESCRIPTION - ONSET: ONSET: ON-GOING

## 2018-01-19 ASSESSMENT — PAIN DESCRIPTION - ORIENTATION: ORIENTATION: RIGHT

## 2018-01-19 NOTE — PROGRESS NOTES
thickness 1/19/2018  8:59 AM   Shallowater%Wound Bed 30 1/10/2018  8:44 AM   Red%Wound Bed 70 1/19/2018  8:59 AM   Yellow%Wound Bed 30 1/19/2018  8:59 AM   Black%Wound Bed 0 1/19/2018  8:59 AM   Purple%Wound Bed 0 1/19/2018  8:59 AM   Other%Wound Bed 0 1/10/2018  8:44 AM   Number of days: 319       Wound 03/06/17 Leg Right; Lower;Medial #2 venous/ lymphedema (stage 3)- combined with wound #3 (Active)   Wound Type Wound 1/19/2018  8:59 AM   Wound Venous 1/10/2018  8:44 AM   Dressing Status Intact 8/16/2017  9:04 AM   Dressing Changed Changed/New 12/4/2017 11:24 AM   Dressing/Treatment Alginate;ABD;Dry dressing 1/10/2018  8:44 AM   Wound Cleansed Rinsed/Irrigated with saline 1/19/2018  8:59 AM   Wound Length (cm) 14.1 cm 1/19/2018  8:59 AM   Wound Width (cm) 18.5 cm 1/19/2018  8:59 AM   Wound Depth (cm)  0.1 1/19/2018  8:59 AM   Calculated Wound Size (cm^2) (l*w) 260.85 cm^2 1/19/2018  8:59 AM   Change in Wound Size % (l*w) -539.34 1/19/2018  8:59 AM   Distance Tunneling (cm) 0 cm 1/8/2018  1:28 PM   Tunneling Position ___ O'Clock 0 1/19/2018  8:59 AM   Undermining Starts ___ O'Clock 0 12/1/2017 10:07 AM   Undermining Ends___ O'Clock 0 1/19/2018  8:59 AM   Undermining Maxium Distance (cm) 0 1/8/2018  1:28 PM   Wound Assessment Yellow;Slough; Red 1/19/2018  8:59 AM   Drainage Amount Copious 1/19/2018  8:59 AM   Drainage Description Brown;Serosanguinous; Yellow 1/19/2018  8:59 AM   Odor Mild 1/19/2018  8:59 AM   Margins Undefined edges 1/19/2018  8:59 AM   Kiah-wound Assessment Pink; Maceration;Edema;Purple 1/19/2018  8:59 AM   Non-staged Wound Description Full thickness 1/19/2018  8:59 AM   Shallowater%Wound Bed 0 1/19/2018  8:59 AM   Red%Wound Bed 35 1/19/2018  8:59 AM   Yellow%Wound Bed 65 1/19/2018  8:59 AM   Black%Wound Bed 0 1/19/2018  8:59 AM   Purple%Wound Bed 0 1/19/2018  8:59 AM   Other%Wound Bed 0 1/10/2018  8:44 AM   Number of days: 319       Percent of Wound Debrided: 50%    Total Surface Area Debrided:  154.8 sq cm    Non Pressure Ulcers only:  [] Limited to breakdown of the skin [x] With fat layer exposed  [] With necrosis of muscle  [] With unspecified severity  [] With necrosis of bone  []       Bleeding: Minimal    Hemostasis Achieved: by pressure    Procedural Pain: 0  / 10     Post Procedural Pain: 0 / 10     Response to treatment:  Well tolerated by patient. Plan:     Treatment Note please see attached Discharge Instructions Patient to get CT scan today to assess IVC patency. Is this Patient a candidate for HBO: No    Written Patient Dismissal Instructions Given       Patient is to return to wound care center in:  1 week(s)    Discharge 200 [facility] Physician Orders and Discharge Instructions  The [facility]  [address], [address]  Telephone: [phone]    NAME:  Lisbet Fernandez  YOB: 1965  MEDICAL RECORD NUMBER:  [id number]  DATE:  1/19/2018    Wash hands with soap and water prior to and after every dressing change. Wound Cleansing:   · Do not scrub or use excessive force. · With each dressing change, rinse wounds with 0.9% Saline. (May use wound wash or soft contact solution. Both can be purchased at a local drug store). · If unable to obtain saline, may use a gentle soap and water. · Keep wounds dry in the shower unless otherwise instructed by the physician. Topical Treatments:  Do not apply lotions, creams, or ointments to wound bed unless directed. [] Apply moisturizing lotion to skin surrounding the wound prior to dressing change.  [] Apply antifungal ointment to skin surrounding the wound prior to dressing change.  [] Apply thin film of moisture barrier ointment to skin immediately around wound.   [] Other:      Dressings:           Wound Location: right lower leg wound     [x] Apply Primary Dressing to wound:       [] Foam/Foam with Border(i.e Mepilex) [] Non-adherent (i.e.Mepitel)   [] Alginate with Silver (i.e. Silvercel)   [x] Alginate   [] Collagen (i.e. Puracol) [] Collagen with Silver(i.e. Candice)     [] Hydrocolloid  [] Hydrafera Blue moistened with saline   [] MediHoney Paste/Gel [] Hydrogel      [] Santyl with Moisten saline gauze    [] Bactroban/Mupirocin [] Polysporin  [] Other:      Pack wound loosely with  [] Iodoform   [] Plain Packing  [] Other      [x] Cover and Secure with:     [] Gauze [x] ABD [] Stretch bandage roll [x] Kerlix   [] Coban [] Ace Wrap [] Cover Roll Tape    [x] Other: optilock   Avoid contact of tape with skin. [x] Change dressing: [] Daily    [] Every Other Day [] Three times per week   [] Once a week [x] Do Not Change Dressing   [] Other:        Compression:  Type: profore 4 layer  ·  Multilayer Compression Wrap Applied in Clinic [x]Right Leg []Left Leg  · Do not get leg(s) with wrap wet. ·  If wraps become too tight call the center or completely remove the wrap. · Elevate leg(s) above the level of the heart when sitting. · Avoid prolonged standing in one place. [] [facility] remove wrap, cleanse affected leg(s) with soap and water, apply wound dressings as ordered above and reapply compression wraps on:       Dietary:  Important dietary reminders:  1. Increase Protein intake (i.e. Lean meats, fish, eggs, legumes, and yogurt)  2. No added salt  3. If diabetic, follow a diabetic diet and check glucose prior to meals or as instructed by your physician.         Return Appointment:  [] Wound and dressing supply provider:   [] ECF or Home Healthcare:  [x] Nurse visit:  Monday  [x] Return Appointment: With Dr. Veto Forbes   in  [address] [place] [address])      Your nurse  is:  Ana Bustillos     Electronically signed by Mackenzie Millan RN on 1/19/2018 at 9:34 R Aileen Cardoso 8 Information: Should you experience any significant changes in your wound(s) or have questions about your wound care, please contact the [facility]

## 2018-01-22 ENCOUNTER — ANTI-COAG VISIT (OUTPATIENT)
Dept: FAMILY MEDICINE CLINIC | Age: 53
End: 2018-01-22

## 2018-01-22 ENCOUNTER — HOSPITAL ENCOUNTER (OUTPATIENT)
Dept: WOUND CARE | Age: 53
Discharge: HOME | End: 2018-01-22
Attending: SPECIALIST | Admitting: SPECIALIST

## 2018-01-22 VITALS
DIASTOLIC BLOOD PRESSURE: 86 MMHG | RESPIRATION RATE: 20 BRPM | HEART RATE: 74 BPM | TEMPERATURE: 98 F | SYSTOLIC BLOOD PRESSURE: 139 MMHG

## 2018-01-22 DIAGNOSIS — I87.311 IDIOPATHIC CHRONIC VENOUS HYPERTENSION OF RIGHT LOWER EXTREMITY WITH ULCER (HCC): ICD-10-CM

## 2018-01-22 DIAGNOSIS — L97.919 IDIOPATHIC CHRONIC VENOUS HYPERTENSION OF RIGHT LOWER EXTREMITY WITH ULCER (HCC): ICD-10-CM

## 2018-01-22 DIAGNOSIS — I82.5Z3 LOWER LEG DVT (DEEP VENOUS THROMBOEMBOLISM), CHRONIC, BILATERAL (HCC): ICD-10-CM

## 2018-01-22 ASSESSMENT — PAIN DESCRIPTION - FREQUENCY: FREQUENCY: CONTINUOUS

## 2018-01-22 ASSESSMENT — PAIN DESCRIPTION - ONSET: ONSET: ON-GOING

## 2018-01-22 ASSESSMENT — PAIN SCALES - GENERAL: PAINLEVEL_OUTOF10: 3

## 2018-01-22 ASSESSMENT — PAIN DESCRIPTION - DESCRIPTORS: DESCRIPTORS: BURNING

## 2018-01-22 ASSESSMENT — PAIN DESCRIPTION - ORIENTATION: ORIENTATION: RIGHT

## 2018-01-22 ASSESSMENT — PAIN DESCRIPTION - LOCATION: LOCATION: LEG

## 2018-01-22 ASSESSMENT — PAIN DESCRIPTION - PAIN TYPE: TYPE: ACUTE PAIN

## 2018-01-22 NOTE — PROGRESS NOTES
Multilayer Compression Wrap   (Not Unna) Below the Knee    NAME:  Keaton Washburn  YOB: 1965  MEDICAL RECORD NUMBER:  [id number]  DATE:  1/22/2018       [x] Removed old Multilayer wrap if indicated and wash leg with mild soap/water.  [x] Applied moisturizing agent to dry skin as needed.  [x] Applied primary and secondary dressing as ordered    [x] Applied multilayered dressing below the right   knee to  Profore per 's instructions.  [x] Instructed patient/caregiver not to remove dressing and to keep it clean and dry.  [x] Instructed patient/caregiver on complications to report to provider, such as pain, numbness in toes, heavy drainage, and slippage of dressing.  [x] Instructed patient on purpose of compression dressing and on activity and exercise recommendations.        Applied per   Guidelines    Electronically signed by La Yang RN on 1/22/2018 at 3:04 PM

## 2018-01-23 RX ORDER — WARFARIN SODIUM 5 MG/1
TABLET ORAL
Qty: 45 TABLET | Refills: 5 | Status: ON HOLD | OUTPATIENT
Start: 2018-01-23 | End: 2018-04-24 | Stop reason: CLARIF

## 2018-01-26 ENCOUNTER — HOSPITAL ENCOUNTER (OUTPATIENT)
Dept: WOUND CARE | Age: 53
Discharge: HOME | End: 2018-01-26
Attending: SPECIALIST | Admitting: SPECIALIST

## 2018-01-26 VITALS
HEART RATE: 83 BPM | DIASTOLIC BLOOD PRESSURE: 84 MMHG | TEMPERATURE: 98 F | RESPIRATION RATE: 18 BRPM | SYSTOLIC BLOOD PRESSURE: 154 MMHG

## 2018-01-26 DIAGNOSIS — I87.311 IDIOPATHIC CHRONIC VENOUS HYPERTENSION OF RIGHT LOWER EXTREMITY WITH ULCER (HCC): Primary | ICD-10-CM

## 2018-01-26 DIAGNOSIS — Z79.01 CHRONIC ANTICOAGULATION: ICD-10-CM

## 2018-01-26 DIAGNOSIS — I74.5 THROMBOSIS OF ILIAC ARTERY (HCC): ICD-10-CM

## 2018-01-26 DIAGNOSIS — L97.919 IDIOPATHIC CHRONIC VENOUS HYPERTENSION OF RIGHT LOWER EXTREMITY WITH ULCER (HCC): Primary | ICD-10-CM

## 2018-01-26 DIAGNOSIS — I89.0 CHRONIC ACQUIRED LYMPHEDEMA: ICD-10-CM

## 2018-01-26 PROCEDURE — 11042 DBRDMT SUBQ TIS 1ST 20SQCM/<: CPT | Performed by: SPECIALIST

## 2018-01-26 PROCEDURE — 11045 DBRDMT SUBQ TISS EACH ADDL: CPT | Performed by: SPECIALIST

## 2018-01-26 RX ORDER — LIDOCAINE HYDROCHLORIDE 40 MG/ML
2.5 SOLUTION TOPICAL ONCE
Status: COMPLETED | OUTPATIENT
Start: 2018-01-26 | End: 2018-01-26

## 2018-01-26 RX ADMIN — LIDOCAINE HYDROCHLORIDE 2.5 ML: 40 SOLUTION TOPICAL at 09:57

## 2018-01-26 ASSESSMENT — PAIN DESCRIPTION - PAIN TYPE: TYPE: CHRONIC PAIN

## 2018-01-26 ASSESSMENT — PAIN DESCRIPTION - ORIENTATION: ORIENTATION: RIGHT

## 2018-01-26 ASSESSMENT — PAIN DESCRIPTION - PROGRESSION: CLINICAL_PROGRESSION: GRADUALLY IMPROVING

## 2018-01-26 ASSESSMENT — PAIN DESCRIPTION - ONSET: ONSET: ON-GOING

## 2018-01-26 ASSESSMENT — PAIN SCALES - GENERAL: PAINLEVEL_OUTOF10: 3

## 2018-01-26 ASSESSMENT — PAIN DESCRIPTION - LOCATION: LOCATION: LEG

## 2018-01-26 ASSESSMENT — PAIN DESCRIPTION - DESCRIPTORS: DESCRIPTORS: ACHING

## 2018-01-26 ASSESSMENT — PAIN DESCRIPTION - FREQUENCY: FREQUENCY: CONTINUOUS

## 2018-01-29 ENCOUNTER — TELEPHONE (OUTPATIENT)
Dept: WOUND CARE | Age: 53
End: 2018-01-29

## 2018-01-29 ENCOUNTER — HOSPITAL ENCOUNTER (OUTPATIENT)
Dept: WOUND CARE | Age: 53
Discharge: HOME | End: 2018-01-29
Attending: SPECIALIST | Admitting: SPECIALIST

## 2018-01-29 VITALS
SYSTOLIC BLOOD PRESSURE: 152 MMHG | RESPIRATION RATE: 18 BRPM | HEART RATE: 67 BPM | TEMPERATURE: 97 F | DIASTOLIC BLOOD PRESSURE: 87 MMHG

## 2018-01-29 RX ORDER — CIPROFLOXACIN 500 MG/1
500 TABLET, FILM COATED ORAL 2 TIMES DAILY
Qty: 20 TABLET | Refills: 0 | Status: SHIPPED | OUTPATIENT
Start: 2018-01-29 | End: 2018-02-08

## 2018-01-29 ASSESSMENT — PAIN DESCRIPTION - FREQUENCY: FREQUENCY: CONTINUOUS

## 2018-01-29 ASSESSMENT — PAIN DESCRIPTION - PAIN TYPE: TYPE: ACUTE PAIN

## 2018-01-29 ASSESSMENT — PAIN DESCRIPTION - LOCATION: LOCATION: LEG

## 2018-01-29 ASSESSMENT — PAIN DESCRIPTION - ORIENTATION: ORIENTATION: RIGHT

## 2018-01-29 ASSESSMENT — PAIN DESCRIPTION - DESCRIPTORS: DESCRIPTORS: ACHING

## 2018-01-29 ASSESSMENT — PAIN SCALES - GENERAL: PAINLEVEL_OUTOF10: 3

## 2018-01-29 NOTE — PROGRESS NOTES
Multilayer Compression Wrap   (Not Unna) Below the Knee    NAME:  Carlota Sherman  YOB: 1965  MEDICAL RECORD NUMBER:  [id number]  DATE:  1/29/2018       [x] Removed old Multilayer wrap if indicated and wash leg with mild soap/water.  [x] Applied moisturizing agent to dry skin as needed.  [x] Applied primary and secondary dressing as ordered    [x] Applied multilayered dressing below the knee to right lower leg(s). profore- 4 layer wrap   [x] Instructed patient/caregiver not to remove dressing and to keep it clean and dry.  [x] Instructed patient/caregiver on complications to report to provider, such as pain, numbness in toes, heavy drainage, and slippage of dressing.  [x] Instructed patient on purpose of compression dressing and on activity and exercise recommendations.     Electronically signed by João Bird RN on 1/29/2018 at 12:34 PM

## 2018-01-30 ENCOUNTER — TELEPHONE (OUTPATIENT)
Dept: WOUND CARE | Age: 53
End: 2018-01-30

## 2018-02-02 ENCOUNTER — HOSPITAL ENCOUNTER (OUTPATIENT)
Dept: WOUND CARE | Age: 53
Discharge: HOME | End: 2018-02-02
Attending: SPECIALIST | Admitting: SPECIALIST

## 2018-02-02 VITALS
HEART RATE: 71 BPM | RESPIRATION RATE: 16 BRPM | DIASTOLIC BLOOD PRESSURE: 87 MMHG | TEMPERATURE: 98.1 F | SYSTOLIC BLOOD PRESSURE: 137 MMHG

## 2018-02-02 RX ORDER — LIDOCAINE HYDROCHLORIDE 40 MG/ML
2.5 SOLUTION TOPICAL ONCE
Status: COMPLETED | OUTPATIENT
Start: 2018-02-02 | End: 2018-02-02

## 2018-02-02 RX ADMIN — LIDOCAINE HYDROCHLORIDE 2.5 ML: 40 SOLUTION TOPICAL at 08:35

## 2018-02-02 ASSESSMENT — PAIN DESCRIPTION - LOCATION: LOCATION: LEG

## 2018-02-02 ASSESSMENT — PAIN SCALES - GENERAL: PAINLEVEL_OUTOF10: 4

## 2018-02-02 ASSESSMENT — PAIN DESCRIPTION - DESCRIPTORS: DESCRIPTORS: BURNING;ACHING

## 2018-02-02 ASSESSMENT — PAIN DESCRIPTION - ONSET: ONSET: ON-GOING

## 2018-02-02 ASSESSMENT — PAIN DESCRIPTION - FREQUENCY: FREQUENCY: CONTINUOUS

## 2018-02-02 ASSESSMENT — PAIN DESCRIPTION - ORIENTATION: ORIENTATION: RIGHT

## 2018-02-02 ASSESSMENT — PAIN DESCRIPTION - PAIN TYPE: TYPE: ACUTE PAIN

## 2018-02-05 ENCOUNTER — HOSPITAL ENCOUNTER (OUTPATIENT)
Dept: WOUND CARE | Age: 53
Discharge: HOME | End: 2018-02-05
Attending: SPECIALIST | Admitting: SPECIALIST

## 2018-02-05 VITALS
RESPIRATION RATE: 16 BRPM | DIASTOLIC BLOOD PRESSURE: 80 MMHG | SYSTOLIC BLOOD PRESSURE: 149 MMHG | TEMPERATURE: 98.1 F | HEART RATE: 88 BPM

## 2018-02-05 DIAGNOSIS — Z79.01 CHRONIC ANTICOAGULATION: ICD-10-CM

## 2018-02-05 DIAGNOSIS — I87.311 IDIOPATHIC CHRONIC VENOUS HYPERTENSION OF RIGHT LOWER EXTREMITY WITH ULCER (HCC): ICD-10-CM

## 2018-02-05 DIAGNOSIS — I82.220 THROMBOSIS OF INFERIOR VENA CAVA (HCC): Primary | ICD-10-CM

## 2018-02-05 DIAGNOSIS — L97.919 IDIOPATHIC CHRONIC VENOUS HYPERTENSION OF RIGHT LOWER EXTREMITY WITH ULCER (HCC): ICD-10-CM

## 2018-02-05 DIAGNOSIS — R60.0 LOCALIZED EDEMA: ICD-10-CM

## 2018-02-05 DIAGNOSIS — I89.0 CHRONIC ACQUIRED LYMPHEDEMA: ICD-10-CM

## 2018-02-05 PROCEDURE — 99999 PR OFFICE/OUTPT VISIT,PROCEDURE ONLY: CPT | Performed by: SPECIALIST

## 2018-02-05 PROCEDURE — 11042 DBRDMT SUBQ TIS 1ST 20SQCM/<: CPT | Performed by: SPECIALIST

## 2018-02-05 PROCEDURE — 11045 DBRDMT SUBQ TISS EACH ADDL: CPT | Performed by: SPECIALIST

## 2018-02-05 RX ORDER — LIDOCAINE HYDROCHLORIDE 40 MG/ML
2.5 SOLUTION TOPICAL ONCE
Status: COMPLETED | OUTPATIENT
Start: 2018-02-05 | End: 2018-02-05

## 2018-02-05 RX ADMIN — LIDOCAINE HYDROCHLORIDE 2.5 ML: 40 SOLUTION TOPICAL at 10:01

## 2018-02-05 ASSESSMENT — PAIN DESCRIPTION - FREQUENCY: FREQUENCY: CONTINUOUS

## 2018-02-05 ASSESSMENT — PAIN DESCRIPTION - LOCATION: LOCATION: LEG

## 2018-02-05 ASSESSMENT — PAIN DESCRIPTION - ORIENTATION: ORIENTATION: RIGHT

## 2018-02-05 ASSESSMENT — PAIN DESCRIPTION - DESCRIPTORS: DESCRIPTORS: ACHING

## 2018-02-05 ASSESSMENT — PAIN DESCRIPTION - PAIN TYPE: TYPE: CHRONIC PAIN

## 2018-02-05 ASSESSMENT — PAIN DESCRIPTION - ONSET: ONSET: ON-GOING

## 2018-02-05 ASSESSMENT — PAIN SCALES - GENERAL: PAINLEVEL_OUTOF10: 5

## 2018-02-05 NOTE — PROGRESS NOTES
Procedure Note  Indications:  Based on my examination of this patient's wound(s) today, sharp excision is required to promote healing and evaluate the extent healing. Performed by: Viridiana Vazquez MD    Consent obtained? Yes    Time out taken: Yes    Pain Control: Anesthetic: 4% Lidocaine Liquid Topical     Debridement:Excisional Debridement    Using curette the wound was sharply debrided    down through and including the removal of  epidermis, dermis and subcutaneous tissue. Devitalized Tissue Debrided:  fibrin, biofilm and slough      Pre Debridement Measurements:  Are located in the Wound Documentation Flow Sheet   Wound #: 1 and 2     Post  Debridement Measurements:  Wound 03/06/17 Leg Right; Lower; Anterior #1  venous/lymphedema (stage 3) (Active)   Wound Type Wound 2/5/2018  9:44 AM   Wound Venous 1/26/2018  9:40 AM   Dressing Status Clean;Dry; Intact 12/4/2017 11:24 AM   Dressing/Treatment Alginate;ABD 2/5/2018 10:57 AM   Wound Cleansed Rinsed/Irrigated with saline 2/5/2018  9:44 AM   Dressing Change Due 12/06/17 12/6/2017  8:11 AM   Wound Length (cm) 5.5 cm 2/5/2018  9:44 AM   Wound Width (cm) 9.3 cm 2/5/2018  9:44 AM   Wound Depth (cm)  0.1 2/5/2018  9:44 AM   Calculated Wound Size (cm^2) (l*w) 51.15 cm^2 2/5/2018  9:44 AM   Change in Wound Size % (l*w) -97.49 2/5/2018  9:44 AM   Distance Tunneling (cm) 0 cm 2/5/2018  9:44 AM   Tunneling Position ___ O'Clock 0 12/1/2017 10:07 AM   Undermining Starts ___ O'Clock 0 1/22/2018  2:56 PM   Undermining Ends___ O'Clock 0 12/1/2017 10:07 AM   Undermining Maxium Distance (cm) 0 2/5/2018  9:44 AM   Wound Assessment Red;Slough; Yellow;Gray 2/5/2018  9:44 AM   Drainage Amount Copious 2/5/2018  9:44 AM   Drainage Description Brown;Serosanguinous 2/5/2018  9:44 AM   Odor Strong 2/5/2018  9:44 AM   Margins Defined edges 2/5/2018  9:44 AM   Kiah-wound Assessment Excoriated;Pink;Purple; White 2/5/2018  9:44 AM   Non-staged Wound Description Full thickness 2/5/2018  9:44 AM of the skin [x] With fat layer exposed  [] With necrosis of muscle  [] With unspecified severity  [] With necrosis of bone  []       Bleeding: Minimal    Hemostasis Achieved: by pressure    Procedural Pain: 2  / 10     Post Procedural Pain: 0 / 10     Response to treatment:  With complaints of pain. spoke with surgeon at the [address] [place] [address] and he agrees there is little change in the post procedure scan. Clinically he is unchanged. Plan:     Treatment Note please see attached Discharge Instructions    Is this Patient a candidate for HBO: No    Written Patient Dismissal Instructions Given       Patient is to return to wound care center in:  1 week(s)    Discharge 200 [facility] Physician Orders and Discharge Instructions  The [address] [place] [address]  Telephone: [phone]    NAME:  Lenora Macdonald  YOB: 1965  MEDICAL RECORD NUMBER:  [id number]  DATE:  2/5/2018    Wash hands with soap and water prior to and after every dressing change. Wound Cleansing:   · Do not scrub or use excessive force. · With each dressing change, rinse wounds with 0.9% Saline. (May use wound wash or soft contact solution. Both can be purchased at a local drug store). · If unable to obtain saline, may use a gentle soap and water. · Keep wounds dry in the shower unless otherwise instructed by the physician. Topical Treatments:  Do not apply lotions, creams, or ointments to wound bed unless directed. [] Apply moisturizing lotion to skin surrounding the wound prior to dressing change.  [] Apply antifungal ointment to skin surrounding the wound prior to dressing change.  [] Apply thin film of moisture barrier ointment to skin immediately around wound.   [] Other:       Dressings:           Wound Location: right lower leg wound     [x] Apply Primary Dressing to wound:       [] Foam/Foam with Border(i.e Mepilex) []

## 2018-02-09 ENCOUNTER — HOSPITAL ENCOUNTER (OUTPATIENT)
Dept: WOUND CARE | Age: 53
Discharge: HOME | End: 2018-02-09
Attending: SPECIALIST | Admitting: SPECIALIST

## 2018-02-09 VITALS
TEMPERATURE: 98.1 F | RESPIRATION RATE: 16 BRPM | DIASTOLIC BLOOD PRESSURE: 84 MMHG | HEART RATE: 74 BPM | SYSTOLIC BLOOD PRESSURE: 140 MMHG

## 2018-02-09 ASSESSMENT — PAIN SCALES - GENERAL: PAINLEVEL_OUTOF10: 5

## 2018-02-09 ASSESSMENT — PAIN DESCRIPTION - ORIENTATION: ORIENTATION: RIGHT

## 2018-02-09 ASSESSMENT — PAIN DESCRIPTION - LOCATION: LOCATION: LEG

## 2018-02-09 ASSESSMENT — PAIN DESCRIPTION - PAIN TYPE: TYPE: CHRONIC PAIN

## 2018-02-12 ENCOUNTER — OFFICE VISIT (OUTPATIENT)
Dept: FAMILY MEDICINE CLINIC | Age: 53
End: 2018-02-12

## 2018-02-12 ENCOUNTER — HOSPITAL ENCOUNTER (OUTPATIENT)
Dept: WOUND CARE | Age: 53
Discharge: HOME | End: 2018-02-12
Attending: SPECIALIST | Admitting: SPECIALIST

## 2018-02-12 VITALS
OXYGEN SATURATION: 100 % | DIASTOLIC BLOOD PRESSURE: 79 MMHG | RESPIRATION RATE: 18 BRPM | SYSTOLIC BLOOD PRESSURE: 145 MMHG | HEART RATE: 73 BPM

## 2018-02-12 VITALS
DIASTOLIC BLOOD PRESSURE: 83 MMHG | RESPIRATION RATE: 16 BRPM | HEART RATE: 74 BPM | SYSTOLIC BLOOD PRESSURE: 139 MMHG | TEMPERATURE: 97.8 F

## 2018-02-12 DIAGNOSIS — L08.9 STAPHYLOCOCCAL INFECTION OF SKIN: ICD-10-CM

## 2018-02-12 DIAGNOSIS — I87.311 IDIOPATHIC CHRONIC VENOUS HYPERTENSION OF RIGHT LOWER EXTREMITY WITH ULCER (HCC): ICD-10-CM

## 2018-02-12 DIAGNOSIS — I82.220 THROMBOSIS OF INFERIOR VENA CAVA (HCC): Primary | ICD-10-CM

## 2018-02-12 DIAGNOSIS — I89.0 CHRONIC ACQUIRED LYMPHEDEMA: ICD-10-CM

## 2018-02-12 DIAGNOSIS — R60.0 LOCALIZED EDEMA: ICD-10-CM

## 2018-02-12 DIAGNOSIS — L97.919 IDIOPATHIC CHRONIC VENOUS HYPERTENSION OF RIGHT LOWER EXTREMITY WITH ULCER (HCC): ICD-10-CM

## 2018-02-12 DIAGNOSIS — B95.8 STAPHYLOCOCCAL INFECTION OF SKIN: ICD-10-CM

## 2018-02-12 DIAGNOSIS — I10 ESSENTIAL HYPERTENSION: Primary | ICD-10-CM

## 2018-02-12 DIAGNOSIS — Z79.01 CHRONIC ANTICOAGULATION: ICD-10-CM

## 2018-02-12 DIAGNOSIS — A49.8 PSEUDOMONAS INFECTION: ICD-10-CM

## 2018-02-12 DIAGNOSIS — I82.5Z3 LOWER LEG DVT (DEEP VENOUS THROMBOEMBOLISM), CHRONIC, BILATERAL (HCC): ICD-10-CM

## 2018-02-12 PROCEDURE — G8598 ASA/ANTIPLAT THER USED: HCPCS | Performed by: FAMILY MEDICINE

## 2018-02-12 PROCEDURE — G8484 FLU IMMUNIZE NO ADMIN: HCPCS | Performed by: FAMILY MEDICINE

## 2018-02-12 PROCEDURE — 99213 OFFICE O/P EST LOW 20 MIN: CPT | Performed by: FAMILY MEDICINE

## 2018-02-12 PROCEDURE — G8417 CALC BMI ABV UP PARAM F/U: HCPCS | Performed by: FAMILY MEDICINE

## 2018-02-12 PROCEDURE — G8427 DOCREV CUR MEDS BY ELIG CLIN: HCPCS | Performed by: FAMILY MEDICINE

## 2018-02-12 PROCEDURE — 11045 DBRDMT SUBQ TISS EACH ADDL: CPT | Performed by: SPECIALIST

## 2018-02-12 PROCEDURE — 11042 DBRDMT SUBQ TIS 1ST 20SQCM/<: CPT | Performed by: SPECIALIST

## 2018-02-12 PROCEDURE — 3017F COLORECTAL CA SCREEN DOC REV: CPT | Performed by: FAMILY MEDICINE

## 2018-02-12 PROCEDURE — 1036F TOBACCO NON-USER: CPT | Performed by: FAMILY MEDICINE

## 2018-02-12 RX ORDER — LIDOCAINE HYDROCHLORIDE 40 MG/ML
2.5 SOLUTION TOPICAL ONCE
Status: COMPLETED | OUTPATIENT
Start: 2018-02-12 | End: 2018-02-12

## 2018-02-12 RX ORDER — HYDROCHLOROTHIAZIDE 12.5 MG/1
12.5 TABLET ORAL DAILY
Qty: 30 TABLET | Refills: 3 | Status: ON HOLD | OUTPATIENT
Start: 2018-02-12 | End: 2018-04-24 | Stop reason: CLARIF

## 2018-02-12 RX ADMIN — LIDOCAINE HYDROCHLORIDE 2.5 ML: 40 SOLUTION TOPICAL at 10:13

## 2018-02-12 ASSESSMENT — PAIN DESCRIPTION - PROGRESSION: CLINICAL_PROGRESSION: NOT CHANGED

## 2018-02-12 ASSESSMENT — PAIN DESCRIPTION - ORIENTATION: ORIENTATION: RIGHT

## 2018-02-12 ASSESSMENT — PAIN SCALES - GENERAL: PAINLEVEL_OUTOF10: 8

## 2018-02-12 ASSESSMENT — PAIN DESCRIPTION - DESCRIPTORS: DESCRIPTORS: ACHING

## 2018-02-12 ASSESSMENT — PAIN DESCRIPTION - FREQUENCY: FREQUENCY: CONTINUOUS

## 2018-02-12 ASSESSMENT — PAIN DESCRIPTION - LOCATION: LOCATION: LEG

## 2018-02-12 ASSESSMENT — PAIN DESCRIPTION - PAIN TYPE: TYPE: CHRONIC PAIN

## 2018-02-12 NOTE — PROGRESS NOTES
Therapy- continue to use lymphedema pumps in 1 hour increments twice daily     Your nurse  is:  Yesenia     Electronically signed by Daksha Alvares RN on 2/12/2018 at 10:33 AM     [facility] Information: Should you experience any significant changes in your wound(s) or have questions about your wound care, please contact the [address] [place] [address] at [phone] Monday and Wednesday 8:00 am - 2:00 pm and Tuesday, Thursday and Friday from 8:00 am - 4:30 pm.   If you need help with your wound outside these hours and cannot wait until we are again available, contact your PCP or go to the hospital emergency room. PLEASE NOTE: IF YOU ARE UNABLE TO OBTAIN WOUND SUPPLIES, CONTINUE TO USE THE SUPPLIES YOU HAVE AVAILABLE UNTIL YOU ARE ABLE TO REACH US. IT IS MOST IMPORTANT TO KEEP THE WOUND COVERED AT ALL TIMES. Physician orders by:     [] Dr Marielena Healy [] Dr Forets Vallecillo    [] Dr Kentrell Baeza     [] Dr Juana Gutierrez   [x] Dr Jesus Alberto Ortega  [] Dr Mariaa Roca  [] Dr Heber Logan       Physician Signature:__________________________________        The information contained in the After Visit Summary has been reviewed with me, the patient and/or responsible adult, by my health care provider(s). I had the opportunity to ask questions regarding this information.   I have elected to receive;      [] Patient unable to sign Discharge Instructions given to ECF/Transportation/POA            Electronically signed by Shay Acosta MD on 2/12/2018 at 12:20 PM

## 2018-02-16 ENCOUNTER — HOSPITAL ENCOUNTER (OUTPATIENT)
Dept: WOUND CARE | Age: 53
Discharge: HOME | End: 2018-02-16
Attending: SPECIALIST | Admitting: SPECIALIST

## 2018-02-16 VITALS
SYSTOLIC BLOOD PRESSURE: 148 MMHG | DIASTOLIC BLOOD PRESSURE: 80 MMHG | RESPIRATION RATE: 18 BRPM | TEMPERATURE: 98.4 F | HEART RATE: 88 BPM

## 2018-02-16 ASSESSMENT — PAIN SCALES - GENERAL: PAINLEVEL_OUTOF10: 8

## 2018-02-16 ASSESSMENT — PAIN DESCRIPTION - PROGRESSION: CLINICAL_PROGRESSION: NOT CHANGED

## 2018-02-16 ASSESSMENT — PAIN DESCRIPTION - FREQUENCY: FREQUENCY: CONTINUOUS

## 2018-02-16 ASSESSMENT — PAIN DESCRIPTION - ONSET: ONSET: ON-GOING

## 2018-02-16 ASSESSMENT — PAIN DESCRIPTION - DESCRIPTORS: DESCRIPTORS: ACHING

## 2018-02-16 ASSESSMENT — PAIN DESCRIPTION - ORIENTATION: ORIENTATION: RIGHT

## 2018-02-16 ASSESSMENT — PAIN DESCRIPTION - PAIN TYPE: TYPE: CHRONIC PAIN

## 2018-02-16 ASSESSMENT — PAIN DESCRIPTION - LOCATION: LOCATION: LEG

## 2018-02-16 NOTE — PROGRESS NOTES
Multilayer Compression Wrap   (Not Unna) Below the Knee    NAME:  Jaja Song  YOB: 1965  MEDICAL RECORD NUMBER:  [id number]  DATE:  2/16/2018       [x] Removed old Multilayer wrap if indicated and wash leg with mild soap/water.  [x] Applied moisturizing agent to dry skin as needed.  [x] Applied primary and secondary dressing as ordered    [x] Applied multilayered dressing below the right  knee to Profore per 's instructions.  [x] Instructed patient/caregiver not to remove dressing and to keep it clean and dry.  [x] Instructed patient/caregiver on complications to report to provider, such as pain, numbness in toes, heavy drainage, and slippage of dressing.  [x] Instructed patient on purpose of compression dressing and on activity and exercise recommendations.        Applied per   Guidelines    Electronically signed by Tati Pete RN on 2/16/2018 at 4:41 PM

## 2018-02-19 ENCOUNTER — HOSPITAL ENCOUNTER (OUTPATIENT)
Dept: WOUND CARE | Age: 53
Discharge: HOME | End: 2018-02-19
Attending: SPECIALIST | Admitting: SPECIALIST

## 2018-02-19 VITALS
RESPIRATION RATE: 18 BRPM | HEART RATE: 82 BPM | DIASTOLIC BLOOD PRESSURE: 79 MMHG | SYSTOLIC BLOOD PRESSURE: 133 MMHG | TEMPERATURE: 98.5 F

## 2018-02-19 DIAGNOSIS — I82.220 THROMBOSIS OF INFERIOR VENA CAVA (HCC): ICD-10-CM

## 2018-02-19 DIAGNOSIS — I89.0 CHRONIC ACQUIRED LYMPHEDEMA: Primary | ICD-10-CM

## 2018-02-19 DIAGNOSIS — L97.919 IDIOPATHIC CHRONIC VENOUS HYPERTENSION OF RIGHT LOWER EXTREMITY WITH ULCER (HCC): ICD-10-CM

## 2018-02-19 DIAGNOSIS — R60.0 LOCALIZED EDEMA: ICD-10-CM

## 2018-02-19 DIAGNOSIS — I87.311 IDIOPATHIC CHRONIC VENOUS HYPERTENSION OF RIGHT LOWER EXTREMITY WITH ULCER (HCC): ICD-10-CM

## 2018-02-19 PROCEDURE — 11045 DBRDMT SUBQ TISS EACH ADDL: CPT | Performed by: SPECIALIST

## 2018-02-19 PROCEDURE — 11042 DBRDMT SUBQ TIS 1ST 20SQCM/<: CPT | Performed by: SPECIALIST

## 2018-02-19 RX ORDER — LIDOCAINE HYDROCHLORIDE 40 MG/ML
2.5 SOLUTION TOPICAL ONCE
Status: COMPLETED | OUTPATIENT
Start: 2018-02-19 | End: 2018-02-19

## 2018-02-19 RX ADMIN — LIDOCAINE HYDROCHLORIDE 2.5 ML: 40 SOLUTION TOPICAL at 10:07

## 2018-02-19 ASSESSMENT — PAIN DESCRIPTION - LOCATION: LOCATION: LEG

## 2018-02-19 ASSESSMENT — PAIN DESCRIPTION - ORIENTATION: ORIENTATION: RIGHT

## 2018-02-19 ASSESSMENT — PAIN DESCRIPTION - PAIN TYPE: TYPE: ACUTE PAIN

## 2018-02-19 ASSESSMENT — PAIN DESCRIPTION - DESCRIPTORS: DESCRIPTORS: BURNING

## 2018-02-19 NOTE — PROGRESS NOTES
Multilayer Compression Wrap   (Not Unna) Below the Knee    NAME:  Luiz Florez  YOB: 1965  MEDICAL RECORD NUMBER:  [id number]  DATE:  2/19/2018       [x] Removed old Multilayer wrap if indicated and wash leg with mild soap/water.  [x] Applied moisturizing agent to dry skin as needed.  [x] Applied primary and secondary dressing as ordered    [x] Applied multilayered dressing below the right  knee to  Profore) per 's instructions.  [x] Instructed patient/caregiver not to remove dressing and to keep it clean and dry.  [x] Instructed patient/caregiver on complications to report to provider, such as pain, numbness in toes, heavy drainage, and slippage of dressing.  [x] Instructed patient on purpose of compression dressing and on activity and exercise recommendations.        Applied per   Guidelines    Electronically signed by Tamia Crawford RN on 2/19/2018 at 11:01 AM
wounds for 5- 10 minutes        Dressings:           Wound Location: right lower leg wounds     [x] Apply Primary Dressing to wound:       [] Foam/Foam with Border(i.e Mepilex) [] Non-adherent (i.e.Mepitel)   [] Alginate with Silver(i.e. Silvercel) [x] Alginate   [] Collagen(i.e. Puracol) [] Collagen with Silver(i.e. Silvercel)    [] Hydrocolloid  [] Hydrafera Blue moistened with saline   [] MediHoney Paste/Gel [] Hydrogel      [] Santyl covered with gauze moisten with saline    [] Bactroban/Mupirocin [] Polysporin  [] Other:      Pack wound loosely with  [] Iodoform   [] Plain Packing  [] Saline \"wet to dry\"      [x] Cover and Secure with:     [] Gauze [x] ABD [] Stretch bandage roll [x] Kerlix   [] Coban [] Ace Wrap [] Cover Roll Tape    [x] Other: optilock   Avoid contact of tape with skin. [x] Change dressing:  [] Daily    [] Every Other Day [] Three times per week   [] Once a week [] Do Not Change Dressing   [] Other:         Negative Pressure:           Wound Location:   [x] Pressure @ mmHg  []Continuous  []Intermittent   [] Black  [] White Foam [] Green:           [] Other:   [x]Change dressing: []Three times per week    []Other:         Compression:  Type: profore- 4 layer  ·  Multilayer Compression Wrap Applied in Clinic [x]Right Leg []Left Leg  · Do not get leg(s) with wrap wet. ·  If wraps become too tight call the center or completely remove the wrap. · Elevate leg(s) above the level of the heart when sitting. · Avoid prolonged standing in one place. [] [facility] remove wrap, cleanse affected leg(s) with soap and water, apply wound dressings as ordered above and reapply compression wraps on:     Dietary:  Important dietary reminders:  1. Increase Protein intake (i.e. Lean meats, fish, eggs, legumes, and yogurt)  2. No added salt  3. If diabetic, follow a diabetic diet and check glucose prior to meals or as instructed by your physician.         Return Appointment:  []

## 2018-02-21 LAB
GRAM STAIN RESULT: ABNORMAL
ORGANISM: ABNORMAL
WOUND/ABSCESS: ABNORMAL
WOUND/ABSCESS: ABNORMAL

## 2018-02-23 ENCOUNTER — HOSPITAL ENCOUNTER (OUTPATIENT)
Dept: WOUND CARE | Age: 53
Discharge: HOME | End: 2018-02-23
Attending: SPECIALIST | Admitting: SPECIALIST

## 2018-02-26 ENCOUNTER — HOSPITAL ENCOUNTER (OUTPATIENT)
Dept: WOUND CARE | Age: 53
Discharge: HOME | End: 2018-02-26
Attending: SPECIALIST | Admitting: SPECIALIST

## 2018-02-26 VITALS
HEART RATE: 80 BPM | RESPIRATION RATE: 18 BRPM | DIASTOLIC BLOOD PRESSURE: 82 MMHG | TEMPERATURE: 98 F | SYSTOLIC BLOOD PRESSURE: 129 MMHG

## 2018-02-26 ASSESSMENT — PAIN DESCRIPTION - DESCRIPTORS: DESCRIPTORS: BURNING

## 2018-02-26 ASSESSMENT — PAIN DESCRIPTION - PAIN TYPE: TYPE: ACUTE PAIN

## 2018-02-26 ASSESSMENT — PAIN SCALES - GENERAL: PAINLEVEL_OUTOF10: 8

## 2018-02-26 ASSESSMENT — PAIN DESCRIPTION - LOCATION: LOCATION: LEG

## 2018-02-26 ASSESSMENT — PAIN DESCRIPTION - ORIENTATION: ORIENTATION: RIGHT

## 2018-03-02 ENCOUNTER — INITIAL CONSULT (OUTPATIENT)
Dept: INFECTIOUS DISEASES | Age: 53
End: 2018-03-02

## 2018-03-02 ENCOUNTER — HOSPITAL ENCOUNTER (OUTPATIENT)
Dept: WOUND CARE | Age: 53
Discharge: HOME | End: 2018-03-02
Attending: SPECIALIST | Admitting: SPECIALIST

## 2018-03-02 VITALS
RESPIRATION RATE: 16 BRPM | DIASTOLIC BLOOD PRESSURE: 87 MMHG | SYSTOLIC BLOOD PRESSURE: 154 MMHG | TEMPERATURE: 97.4 F | HEART RATE: 76 BPM

## 2018-03-02 DIAGNOSIS — I82.5Z1 CHRONIC VENOUS EMBOLISM AND THROMBOSIS OF DEEP VESSELS OF DISTAL END OF RIGHT LOWER EXTREMITY (HCC): ICD-10-CM

## 2018-03-02 DIAGNOSIS — Z79.01 CHRONIC ANTICOAGULATION: ICD-10-CM

## 2018-03-02 DIAGNOSIS — I82.220 THROMBOSIS OF INFERIOR VENA CAVA (HCC): ICD-10-CM

## 2018-03-02 DIAGNOSIS — L97.919 IDIOPATHIC CHRONIC VENOUS HYPERTENSION OF RIGHT LOWER EXTREMITY WITH ULCER (HCC): Primary | ICD-10-CM

## 2018-03-02 DIAGNOSIS — I89.0 CHRONIC ACQUIRED LYMPHEDEMA: ICD-10-CM

## 2018-03-02 DIAGNOSIS — I82.423 THROMBOSIS OF BOTH ILIAC VEINS (HCC): ICD-10-CM

## 2018-03-02 DIAGNOSIS — I87.311 IDIOPATHIC CHRONIC VENOUS HYPERTENSION OF RIGHT LOWER EXTREMITY WITH ULCER (HCC): Primary | ICD-10-CM

## 2018-03-02 DIAGNOSIS — L97.919 NON-PRESSURE CHRONIC ULCER OF RIGHT LOWER LEG, UNSPECIFIED ULCER STAGE (HCC): Primary | ICD-10-CM

## 2018-03-02 PROCEDURE — G8484 FLU IMMUNIZE NO ADMIN: HCPCS | Performed by: INTERNAL MEDICINE

## 2018-03-02 PROCEDURE — G8417 CALC BMI ABV UP PARAM F/U: HCPCS | Performed by: INTERNAL MEDICINE

## 2018-03-02 PROCEDURE — G8428 CUR MEDS NOT DOCUMENT: HCPCS | Performed by: INTERNAL MEDICINE

## 2018-03-02 PROCEDURE — 1036F TOBACCO NON-USER: CPT | Performed by: INTERNAL MEDICINE

## 2018-03-02 PROCEDURE — 99205 OFFICE O/P NEW HI 60 MIN: CPT | Performed by: INTERNAL MEDICINE

## 2018-03-02 PROCEDURE — 11045 DBRDMT SUBQ TISS EACH ADDL: CPT | Performed by: SPECIALIST

## 2018-03-02 PROCEDURE — 11042 DBRDMT SUBQ TIS 1ST 20SQCM/<: CPT | Performed by: SPECIALIST

## 2018-03-02 PROCEDURE — 3017F COLORECTAL CA SCREEN DOC REV: CPT | Performed by: INTERNAL MEDICINE

## 2018-03-02 PROCEDURE — G8598 ASA/ANTIPLAT THER USED: HCPCS | Performed by: INTERNAL MEDICINE

## 2018-03-02 RX ORDER — HYDROCODONE BITARTRATE AND ACETAMINOPHEN 5; 325 MG/1; MG/1
1 TABLET ORAL NIGHTLY PRN
Qty: 10 TABLET | Refills: 0 | Status: SHIPPED | OUTPATIENT
Start: 2018-03-02 | End: 2018-03-09

## 2018-03-02 RX ORDER — LIDOCAINE HYDROCHLORIDE 40 MG/ML
2.5 SOLUTION TOPICAL ONCE
Status: COMPLETED | OUTPATIENT
Start: 2018-03-02 | End: 2018-03-02

## 2018-03-02 RX ADMIN — LIDOCAINE HYDROCHLORIDE 2.5 ML: 40 SOLUTION TOPICAL at 10:26

## 2018-03-02 ASSESSMENT — PAIN DESCRIPTION - ORIENTATION: ORIENTATION: RIGHT

## 2018-03-02 ASSESSMENT — PAIN DESCRIPTION - DESCRIPTORS: DESCRIPTORS: BURNING

## 2018-03-02 ASSESSMENT — PAIN DESCRIPTION - FREQUENCY: FREQUENCY: CONTINUOUS

## 2018-03-02 ASSESSMENT — PAIN DESCRIPTION - LOCATION: LOCATION: LEG

## 2018-03-02 ASSESSMENT — PAIN DESCRIPTION - PROGRESSION: CLINICAL_PROGRESSION: NOT CHANGED

## 2018-03-02 ASSESSMENT — PAIN DESCRIPTION - PAIN TYPE: TYPE: ACUTE PAIN

## 2018-03-02 ASSESSMENT — PAIN SCALES - GENERAL: PAINLEVEL_OUTOF10: 8

## 2018-03-02 ASSESSMENT — PAIN DESCRIPTION - ONSET: ONSET: ON-GOING

## 2018-03-02 NOTE — PROGRESS NOTES
Procedure Note  Indications:  Based on my examination of this patient's wound(s) today, sharp excision is required to promote healing and evaluate the extent healing. Performed by: Clementina Fink MD    Consent obtained? Yes    Time out taken: Yes    Pain Control: Anesthetic: 4% Lidocaine Liquid Topical     Debridement:Excisional Debridement    Using curette the wound was sharply debrided    down through and including the removal of  epidermis, dermis and subcutaneous tissue. Devitalized Tissue Debrided:  fibrin, biofilm and slough      Pre Debridement Measurements:  Are located in the Wound Documentation Flow Sheet   Wound #: 1 and 2     Post  Debridement Measurements:  Wound 03/06/17 Leg Right; Lower; Anterior #1  venous/lymphedema (stage 3) (Active)   Wound Type Wound 3/2/2018 10:27 AM   Wound Venous 1/26/2018  9:40 AM   Dressing Status Old drainage 2/12/2018  9:57 AM   Dressing/Treatment Other (Comment) 3/2/2018 10:27 AM   Wound Cleansed Rinsed/Irrigated with saline 3/2/2018 10:27 AM   Dressing Change Due 02/16/18 2/12/2018  9:57 AM   Wound Length (cm) 6.5 cm 3/2/2018 10:27 AM   Wound Width (cm) 11 cm 3/2/2018 10:27 AM   Wound Depth (cm)  0.1 3/2/2018 10:27 AM   Calculated Wound Size (cm^2) (l*w) 71.5 cm^2 3/2/2018 10:27 AM   Change in Wound Size % (l*w) -176.06 3/2/2018 10:27 AM   Distance Tunneling (cm) 0 cm 2/23/2018 10:22 AM   Tunneling Position ___ O'Clock 0 12/1/2017 10:07 AM   Undermining Starts ___ O'Clock 0 1/19/2018  8:59 AM   Undermining Ends___ O'Clock 0 12/1/2017 10:07 AM   Undermining Maxium Distance (cm) 0 2/23/2018 10:22 AM   Wound Assessment Red 3/2/2018 10:27 AM   Drainage Amount Copious 3/2/2018 10:27 AM   Drainage Description Brown;Serosanguinous 3/2/2018 10:27 AM   Odor Mild 3/2/2018 10:27 AM   Margins Defined edges 3/2/2018 10:27 AM   Kiah-wound Assessment Excoriated;Pink 3/2/2018 10:27 AM   Non-staged Wound Description Full thickness 3/2/2018 10:27 AM   [place]%Wound Bed 0 2/2/2018  8:16 AM   Red%Wound Bed 70 3/2/2018 10:27 AM   Yellow%Wound Bed 15 2/23/2018 10:22 AM   Black%Wound Bed 0 1/19/2018  8:59 AM   Purple%Wound Bed 0 1/19/2018  8:59 AM   Other%Wound Bed 30 % green black 3/2/2018 10:27 AM   Number of days: 361       Wound 03/06/17 Leg Right; Lower;Medial #2 venous/ lymphedema (stage 3)- combined with wound #3 (Active)   Wound Type Wound 3/2/2018 10:27 AM   Wound Venous 1/10/2018  8:44 AM   Dressing Status Old drainage 2/12/2018  9:57 AM   Dressing Changed Changed/New 12/4/2017 11:24 AM   Dressing/Treatment Alginate;ABD 2/5/2018 10:57 AM   Wound Cleansed Rinsed/Irrigated with saline 3/2/2018 10:27 AM   Wound Length (cm) 14.3 cm 3/2/2018 10:27 AM   Wound Width (cm) 19.5 cm 3/2/2018 10:27 AM   Wound Depth (cm)  0.1 3/2/2018 10:27 AM   Calculated Wound Size (cm^2) (l*w) 278.85 cm^2 3/2/2018 10:27 AM   Change in Wound Size % (l*w) -583.46 3/2/2018 10:27 AM   Distance Tunneling (cm) 0 cm 2/23/2018 10:22 AM   Tunneling Position ___ O'Clock 0 1/19/2018  8:59 AM   Undermining Starts ___ O'Clock 0 12/1/2017 10:07 AM   Undermining Ends___ O'Clock 0 1/19/2018  8:59 AM   Undermining Maxium Distance (cm) 0 2/23/2018 10:22 AM   Wound Assessment Red;Black 3/2/2018 10:27 AM   Drainage Amount Copious 3/2/2018 10:27 AM   Drainage Description Brown;Serosanguinous 3/2/2018 10:27 AM   Odor Mild 3/2/2018 10:27 AM   Margins Defined edges 3/2/2018 10:27 AM   Kiah-wound Assessment Edema;Pink; White 3/2/2018 10:27 AM   Non-staged Wound Description Full thickness 3/2/2018 10:27 AM   [place]%Wound Bed 0 1/19/2018  8:59 AM   Red%Wound Bed 70 3/2/2018 10:27 AM   Yellow%Wound Bed 15 2/23/2018 10:22 AM   Black%Wound Bed 1 3/2/2018 10:27 AM   Purple%Wound Bed 0 1/19/2018  8:59 AM   Other%Wound Bed 29% green 3/2/2018 10:27 AM   Number of days: 361       Percent of Wound Debrided: 50%    Total Surface Area Debrided:  174 sq cm    Non Pressure Ulcers only:  [] Limited to breakdown of the skin [x] With fat layer exposed  [] With

## 2018-03-02 NOTE — PROGRESS NOTES
Infectious Diseases Consult Note    Reason for Consult:   R LE wounds, cult + Pseudomonas  Requesting Physician:   Dr George Spann  Primary Care Physician:  Gem Beal DO  History Obtained From:   Patient, EPIC    CHIEF COMPLAINT:    R leg wounds    HISTORY OF PRESENT ILLNESS:      45 yo man from [facility] with hx DVT. 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 R LE edema with lower leg edema and large area of skin breakdown / wounds. Wounds are non-healing. Increase in size per Dr George Spann. Will vary day to day / visit to visit. Attend [address] twice a week for debridement and dressing changes ('4 layer dressing'). Has had cult take 9/25, 1/5, 2/19 and has had Ps aeruginosa. Isolated from 1/5 and 2/18 are MDR. Past Medical History:    Past Medical History:   Diagnosis Date    DVT (deep venous thrombosis) (HCC)     Pain and swelling of right lower leg        Past Surgical History:    Past Surgical History:   Procedure Laterality Date    BALLOON ANGIOPLASTY, ARTERY Right 12/19/2017    at [facility]       Current Medications:    Current Outpatient Prescriptions   Medication Sig Dispense Refill    HYDROcodone-acetaminophen (NORCO) 5-325 MG per tablet Take 1 tablet by mouth nightly as needed for Pain for up to 7 days. 10 tablet 0    hydrochlorothiazide (HYDRODIURIL) 12.5 MG tablet Take 1 tablet by mouth daily 30 tablet 3    Handicap Placard MISC by Does not apply route Dx Lower loeg DVT chronic, bilateral. Effective 2/12/2018 until 2/12/2021. 1 each 0    Handicap Placard MISC by Does not apply route Dx Lower loeg DVT chronic, bilateral. Effective 2/12/2018 until 2/12/2021. 1 each 0    warfarin (COUMADIN) 5 MG tablet Take 1 tablet po every day. Add 1/2 tablet on Sunday, Tuesday, Thursday.  45 tablet 5    ibuprofen (ADVIL) 200 MG CAPS Take 1 capsule by mouth every 4 hours as needed for Fever       No current facility-administered

## 2018-03-05 ENCOUNTER — TELEPHONE (OUTPATIENT)
Dept: INFECTIOUS DISEASES | Age: 53
End: 2018-03-05

## 2018-03-05 ENCOUNTER — HOSPITAL ENCOUNTER (OUTPATIENT)
Dept: WOUND CARE | Age: 53
Discharge: HOME | End: 2018-03-05
Attending: SPECIALIST | Admitting: SPECIALIST

## 2018-03-05 VITALS
HEART RATE: 73 BPM | TEMPERATURE: 97.5 F | SYSTOLIC BLOOD PRESSURE: 135 MMHG | RESPIRATION RATE: 16 BRPM | DIASTOLIC BLOOD PRESSURE: 77 MMHG

## 2018-03-05 LAB
A/G RATIO: 1 (ref 1.1–2.2)
ALBUMIN SERPL-MCNC: 3.5 G/DL (ref 3.4–5)
ALP BLD-CCNC: 69 U/L (ref 40–129)
ALT SERPL-CCNC: 11 U/L (ref 10–40)
ANION GAP SERPL CALCULATED.3IONS-SCNC: 13 MMOL/L (ref 3–16)
AST SERPL-CCNC: 10 U/L (ref 15–37)
BILIRUB SERPL-MCNC: 0.3 MG/DL (ref 0–1)
BUN BLDV-MCNC: 20 MG/DL (ref 7–20)
CALCIUM SERPL-MCNC: 8.4 MG/DL (ref 8.3–10.6)
CHLORIDE BLD-SCNC: 108 MMOL/L (ref 99–110)
CO2: 20 MMOL/L (ref 21–32)
CREAT SERPL-MCNC: 0.9 MG/DL (ref 0.9–1.3)
GFR AFRICAN AMERICAN: >60
GFR NON-AFRICAN AMERICAN: >60
GLOBULIN: 3.4 G/DL
GLUCOSE BLD-MCNC: 102 MG/DL (ref 70–99)
POTASSIUM SERPL-SCNC: 3.9 MMOL/L (ref 3.5–5.1)
SODIUM BLD-SCNC: 141 MMOL/L (ref 136–145)
TOTAL PROTEIN: 6.9 G/DL (ref 6.4–8.2)

## 2018-03-05 RX ORDER — HEPARIN SODIUM (PORCINE) LOCK FLUSH IV SOLN 100 UNIT/ML 100 UNIT/ML
300 SOLUTION INTRAVENOUS PRN
Status: CANCELLED | OUTPATIENT
Start: 2018-03-05

## 2018-03-05 ASSESSMENT — PAIN DESCRIPTION - LOCATION: LOCATION: LEG

## 2018-03-05 ASSESSMENT — PAIN DESCRIPTION - PROGRESSION: CLINICAL_PROGRESSION: GRADUALLY WORSENING

## 2018-03-05 ASSESSMENT — PAIN DESCRIPTION - ORIENTATION: ORIENTATION: RIGHT

## 2018-03-05 ASSESSMENT — PAIN DESCRIPTION - PAIN TYPE: TYPE: CHRONIC PAIN

## 2018-03-05 ASSESSMENT — PAIN DESCRIPTION - FREQUENCY: FREQUENCY: CONTINUOUS

## 2018-03-05 ASSESSMENT — PAIN DESCRIPTION - ONSET: ONSET: ON-GOING

## 2018-03-05 ASSESSMENT — PAIN SCALES - GENERAL: PAINLEVEL_OUTOF10: 9

## 2018-03-06 ENCOUNTER — HOSPITAL ENCOUNTER (OUTPATIENT)
Dept: ONCOLOGY | Age: 53
Discharge: HOME | End: 2018-03-06
Attending: INTERNAL MEDICINE | Admitting: INTERNAL MEDICINE

## 2018-03-06 ENCOUNTER — HOSPITAL ENCOUNTER (OUTPATIENT)
Dept: ONCOLOGY | Age: 53
Discharge: HOME | End: 2018-03-07
Attending: INTERNAL MEDICINE | Admitting: SPECIALIST

## 2018-03-06 ENCOUNTER — TELEPHONE (OUTPATIENT)
Dept: FAMILY MEDICINE CLINIC | Age: 53
End: 2018-03-06

## 2018-03-06 ENCOUNTER — HOSPITAL ENCOUNTER (OUTPATIENT)
Dept: ONCOLOGY | Age: 53
Discharge: HOME | End: 2018-03-31
Attending: INTERNAL MEDICINE | Admitting: INTERNAL MEDICINE

## 2018-03-06 VITALS
RESPIRATION RATE: 18 BRPM | DIASTOLIC BLOOD PRESSURE: 80 MMHG | SYSTOLIC BLOOD PRESSURE: 135 MMHG | TEMPERATURE: 98.4 F | HEART RATE: 83 BPM

## 2018-03-06 LAB
ANION GAP SERPL CALCULATED.3IONS-SCNC: 11 MMOL/L (ref 3–16)
BASOPHILS ABSOLUTE: 0 K/UL (ref 0–0.2)
BASOPHILS RELATIVE PERCENT: 0.1 %
BUN BLDV-MCNC: 21 MG/DL (ref 7–20)
C-REACTIVE PROTEIN: 74.5 MG/L (ref 0–5.1)
CALCIUM SERPL-MCNC: 8.3 MG/DL (ref 8.3–10.6)
CHLORIDE BLD-SCNC: 109 MMOL/L (ref 99–110)
CO2: 21 MMOL/L (ref 21–32)
CREAT SERPL-MCNC: 0.8 MG/DL (ref 0.9–1.3)
EOSINOPHILS ABSOLUTE: 0.4 K/UL (ref 0–0.6)
EOSINOPHILS RELATIVE PERCENT: 5 %
GFR AFRICAN AMERICAN: >60
GFR NON-AFRICAN AMERICAN: >60
GLUCOSE BLD-MCNC: 115 MG/DL (ref 70–99)
HCT VFR BLD CALC: 30.4 % (ref 40.5–52.5)
HEMOGLOBIN: 9.9 G/DL (ref 13.5–17.5)
INR BLD: 4.28 (ref 0.85–1.15)
LYMPHOCYTES ABSOLUTE: 1.3 K/UL (ref 1–5.1)
LYMPHOCYTES RELATIVE PERCENT: 17.2 %
MCH RBC QN AUTO: 24.7 PG (ref 26–34)
MCHC RBC AUTO-ENTMCNC: 32.4 G/DL (ref 31–36)
MCV RBC AUTO: 76.1 FL (ref 80–100)
MONOCYTES ABSOLUTE: 0.8 K/UL (ref 0–1.3)
MONOCYTES RELATIVE PERCENT: 10.4 %
NEUTROPHILS ABSOLUTE: 5.2 K/UL (ref 1.7–7.7)
NEUTROPHILS RELATIVE PERCENT: 67.3 %
PDW BLD-RTO: 17.1 % (ref 12.4–15.4)
PLATELET # BLD: 369 K/UL (ref 135–450)
PMV BLD AUTO: 6.1 FL (ref 5–10.5)
POTASSIUM SERPL-SCNC: 4.5 MMOL/L (ref 3.5–5.1)
PROTHROMBIN TIME: 48.4 SEC (ref 9.6–13)
RBC # BLD: 4 M/UL (ref 4.2–5.9)
SEDIMENTATION RATE, ERYTHROCYTE: 72 MM/HR (ref 0–20)
SODIUM BLD-SCNC: 141 MMOL/L (ref 136–145)
WBC # BLD: 7.8 K/UL (ref 4–11)

## 2018-03-06 RX ORDER — 0.9 % SODIUM CHLORIDE 0.9 %
10 VIAL (ML) INJECTION PRN
Status: CANCELLED
Start: 2018-03-06

## 2018-03-06 RX ORDER — HEPARIN SODIUM (PORCINE) LOCK FLUSH IV SOLN 100 UNIT/ML 100 UNIT/ML
300 SOLUTION INTRAVENOUS PRN
Status: DISCONTINUED | OUTPATIENT
Start: 2018-03-06 | End: 2018-03-08 | Stop reason: HOSPADM

## 2018-03-06 RX ORDER — SODIUM CHLORIDE 0.9 % (FLUSH) 0.9 %
5 SYRINGE (ML) INJECTION PRN
Status: CANCELLED
Start: 2018-03-06

## 2018-03-06 RX ORDER — HEPARIN SODIUM (PORCINE) LOCK FLUSH IV SOLN 100 UNIT/ML 100 UNIT/ML
500 SOLUTION INTRAVENOUS PRN
Status: CANCELLED
Start: 2018-03-06

## 2018-03-06 ASSESSMENT — PAIN DESCRIPTION - ORIENTATION: ORIENTATION: RIGHT;LOWER

## 2018-03-06 ASSESSMENT — PAIN DESCRIPTION - PAIN TYPE: TYPE: CHRONIC PAIN

## 2018-03-06 ASSESSMENT — PAIN DESCRIPTION - FREQUENCY: FREQUENCY: CONTINUOUS

## 2018-03-06 ASSESSMENT — PAIN DESCRIPTION - LOCATION: LOCATION: LEG

## 2018-03-06 NOTE — TELEPHONE ENCOUNTER
Thanks all for teamwork --    Pt should report to Infusion / Onc outpatient Fri am, have INR and if less than 2, have PICC place, have 1st dose zosyn. Mc Yun continue to try to reach pt with this.

## 2018-03-06 NOTE — PROGRESS NOTES
Patient arrived ambulatory for scheduled PICC line placement and Zosyn infusion. INR 4.28 and too high for PICC placement. Dr. Jovon Live informed and will call with new orders. Ok to d/c patient. Patient informed and verbalized understanding. D/C'd ambulatory to work.

## 2018-03-06 NOTE — TELEPHONE ENCOUNTER
Patient curently at [address] [place] [address] for his Picc line placement. The nurse Keyana Rapp stated this patient PT is 48.4 and INR 4.28 which makes him not a candidate for a PICC line placement. Keyana Rapp the nurse would like to talk with Dr. Ishaan Claudio to see if a peripheral line should be started.  Call back number for Keyana Rapp is [phone]

## 2018-03-09 ENCOUNTER — HOSPITAL ENCOUNTER (OUTPATIENT)
Dept: ONCOLOGY | Age: 53
Discharge: HOME | End: 2018-03-10
Attending: INTERNAL MEDICINE | Admitting: INTERNAL MEDICINE

## 2018-03-09 ENCOUNTER — HOSPITAL ENCOUNTER (OUTPATIENT)
Dept: WOUND CARE | Age: 53
Discharge: HOME | End: 2018-03-09
Attending: SPECIALIST | Admitting: SPECIALIST

## 2018-03-09 ENCOUNTER — TELEPHONE (OUTPATIENT)
Dept: FAMILY MEDICINE CLINIC | Age: 53
End: 2018-03-09

## 2018-03-09 VITALS — DIASTOLIC BLOOD PRESSURE: 70 MMHG | SYSTOLIC BLOOD PRESSURE: 119 MMHG | HEART RATE: 70 BPM | TEMPERATURE: 98.4 F

## 2018-03-09 VITALS
HEART RATE: 76 BPM | RESPIRATION RATE: 18 BRPM | DIASTOLIC BLOOD PRESSURE: 83 MMHG | SYSTOLIC BLOOD PRESSURE: 144 MMHG | TEMPERATURE: 97.9 F

## 2018-03-09 DIAGNOSIS — I87.311 IDIOPATHIC CHRONIC VENOUS HYPERTENSION OF RIGHT LOWER EXTREMITY WITH ULCER (HCC): ICD-10-CM

## 2018-03-09 DIAGNOSIS — Z79.01 CHRONIC ANTICOAGULATION: ICD-10-CM

## 2018-03-09 DIAGNOSIS — L97.919 IDIOPATHIC CHRONIC VENOUS HYPERTENSION OF RIGHT LOWER EXTREMITY WITH ULCER (HCC): ICD-10-CM

## 2018-03-09 DIAGNOSIS — I89.0 CHRONIC ACQUIRED LYMPHEDEMA: Primary | ICD-10-CM

## 2018-03-09 DIAGNOSIS — I82.220 THROMBOSIS OF INFERIOR VENA CAVA (HCC): ICD-10-CM

## 2018-03-09 DIAGNOSIS — I82.423 THROMBOSIS OF BOTH ILIAC VEINS (HCC): ICD-10-CM

## 2018-03-09 LAB
INR BLD: 2.5 (ref 0.85–1.15)
PROTHROMBIN TIME: 28.2 SEC (ref 9.6–13)

## 2018-03-09 PROCEDURE — 11045 DBRDMT SUBQ TISS EACH ADDL: CPT | Performed by: SPECIALIST

## 2018-03-09 PROCEDURE — 11042 DBRDMT SUBQ TIS 1ST 20SQCM/<: CPT | Performed by: SPECIALIST

## 2018-03-09 RX ORDER — LIDOCAINE HYDROCHLORIDE 10 MG/ML
5 INJECTION, SOLUTION EPIDURAL; INFILTRATION; INTRACAUDAL; PERINEURAL ONCE
Status: COMPLETED | OUTPATIENT
Start: 2018-03-09 | End: 2018-03-09

## 2018-03-09 RX ORDER — SODIUM CHLORIDE 0.9 % (FLUSH) 0.9 %
10 SYRINGE (ML) INJECTION PRN
Status: DISCONTINUED | OUTPATIENT
Start: 2018-03-09 | End: 2018-03-11 | Stop reason: HOSPADM

## 2018-03-09 RX ORDER — SODIUM CHLORIDE 0.9 % (FLUSH) 0.9 %
10 SYRINGE (ML) INJECTION EVERY 12 HOURS SCHEDULED
Status: DISCONTINUED | OUTPATIENT
Start: 2018-03-09 | End: 2018-03-11 | Stop reason: HOSPADM

## 2018-03-09 RX ORDER — LIDOCAINE HYDROCHLORIDE 40 MG/ML
2.5 SOLUTION TOPICAL ONCE
Status: COMPLETED | OUTPATIENT
Start: 2018-03-09 | End: 2018-03-09

## 2018-03-09 RX ORDER — HEPARIN SODIUM (PORCINE) LOCK FLUSH IV SOLN 100 UNIT/ML 100 UNIT/ML
300 SOLUTION INTRAVENOUS PRN
Status: DISCONTINUED | OUTPATIENT
Start: 2018-03-09 | End: 2018-03-11 | Stop reason: HOSPADM

## 2018-03-09 RX ADMIN — HEPARIN SODIUM (PORCINE) LOCK FLUSH IV SOLN 100 UNIT/ML 300 UNITS: 100 SOLUTION at 13:25

## 2018-03-09 RX ADMIN — LIDOCAINE HYDROCHLORIDE 5 ML: 10 INJECTION, SOLUTION EPIDURAL; INFILTRATION; INTRACAUDAL; PERINEURAL at 11:36

## 2018-03-09 RX ADMIN — LIDOCAINE HYDROCHLORIDE 2.5 ML: 40 SOLUTION TOPICAL at 09:48

## 2018-03-09 ASSESSMENT — PAIN DESCRIPTION - LOCATION: LOCATION: LEG

## 2018-03-09 ASSESSMENT — PAIN SCALES - GENERAL: PAINLEVEL_OUTOF10: 9

## 2018-03-09 ASSESSMENT — PAIN DESCRIPTION - DESCRIPTORS: DESCRIPTORS: BURNING

## 2018-03-09 ASSESSMENT — PAIN DESCRIPTION - PAIN TYPE: TYPE: ACUTE PAIN

## 2018-03-09 NOTE — PROGRESS NOTES
Procedure Note  Indications:  Based on my examination of this patient's wound(s) today, sharp excision is required to promote healing and evaluate the extent healing. Performed by: Viridiana Vazquez MD    Consent obtained? Yes    Time out taken: Yes    Pain Control: Anesthetic: 4% Lidocaine Liquid Topical     Debridement:Excisional Debridement    Using curette the wound was sharply debrided    down through and including the removal of  epidermis, dermis and subcutaneous tissue. Devitalized Tissue Debrided:  fibrin, biofilm and slough      Pre Debridement Measurements:  Are located in the Wound Documentation Flow Sheet   Wound #: 1 and 2     Post  Debridement Measurements:  Wound 03/06/17 Leg Right; Lower; Anterior #1  venous/lymphedema (stage 3) (Active)   Wound Image   3/9/2018  9:33 AM   Wound Type Wound 3/9/2018  9:33 AM   Wound Venous 1/26/2018  9:40 AM   Dressing Status Old drainage 2/12/2018  9:57 AM   Dressing/Treatment Other (Comment) 3/5/2018 10:07 AM   Wound Cleansed Rinsed/Irrigated with saline 3/9/2018  9:33 AM   Dressing Change Due 02/16/18 2/12/2018  9:57 AM   Wound Length (cm) 14.8 cm 3/9/2018 10:11 AM   Wound Width (cm) 11.1 cm 3/9/2018 10:11 AM   Wound Depth (cm)  0.4 3/9/2018 10:11 AM   Calculated Wound Size (cm^2) (l*w) 164.28 cm^2 3/9/2018 10:11 AM   Change in Wound Size % (l*w) -534.29 3/9/2018 10:11 AM   Distance Tunneling (cm) 0 cm 2/23/2018 10:22 AM   Tunneling Position ___ O'Clock 0 12/1/2017 10:07 AM   Undermining Starts ___ O'Clock 0 1/19/2018  8:59 AM   Undermining Ends___ O'Clock 0 12/1/2017 10:07 AM   Undermining Maxium Distance (cm) 0 2/23/2018 10:22 AM   Wound Assessment Black;Red;Yellow 3/9/2018  9:33 AM   Drainage Amount Copious 3/9/2018  9:33 AM   Drainage Description Brown;Serosanguinous 3/9/2018  9:33 AM   Odor Strong 3/9/2018  9:33 AM   Margins Defined edges 3/9/2018  9:33 AM   Kiah-wound Assessment Excoriated;Pink 3/9/2018  9:33 AM   Non-staged Wound Description Full thickness 3/9/2018  9:33 AM   [place]%Wound Bed 0 2/2/2018  8:16 AM   Red%Wound Bed 70 3/2/2018 10:27 AM   Yellow%Wound Bed 50 3/9/2018  9:33 AM   Black%Wound Bed 10 3/9/2018  9:33 AM   Purple%Wound Bed 0 1/19/2018  8:59 AM   Other%Wound Bed 50 3/9/2018  9:33 AM   Number of days: 368       Wound 03/06/17 Leg Right; Lower;Medial #2 venous/ lymphedema (stage 3)- combined with wound #3 (Active)   Wound Image   3/9/2018  9:33 AM   Wound Type Wound 3/9/2018  9:33 AM   Wound Venous 1/10/2018  8:44 AM   Dressing Status Old drainage 2/12/2018  9:57 AM   Dressing Changed Changed/New 12/4/2017 11:24 AM   Dressing/Treatment Alginate;ABD 2/5/2018 10:57 AM   Wound Cleansed Rinsed/Irrigated with saline 3/9/2018  9:33 AM   Wound Length (cm) 17.1 cm 3/9/2018 10:11 AM   Wound Width (cm) 20.6 cm 3/9/2018 10:11 AM   Wound Depth (cm)  0.4 3/9/2018 10:11 AM   Calculated Wound Size (cm^2) (l*w) 352.26 cm^2 3/9/2018 10:11 AM   Change in Wound Size % (l*w) -763.38 3/9/2018 10:11 AM   Distance Tunneling (cm) 0 cm 2/23/2018 10:22 AM   Tunneling Position ___ O'Clock 0 3/9/2018  9:33 AM   Undermining Starts ___ O'Clock 0 12/1/2017 10:07 AM   Undermining Ends___ O'Clock 0 3/9/2018  9:33 AM   Undermining Maxium Distance (cm) 0 2/23/2018 10:22 AM   Wound Assessment Black;Red;Yellow 3/9/2018  9:33 AM   Drainage Amount Copious 3/9/2018  9:33 AM   Drainage Description Brown;Serosanguinous 3/9/2018  9:33 AM   Odor Strong 3/9/2018  9:33 AM   Margins Defined edges 3/9/2018  9:33 AM   Kiah-wound Assessment Excoriated;Fragile;Pink 3/9/2018  9:33 AM   Non-staged Wound Description Full thickness 3/9/2018  9:33 AM   [place]%Wound Bed 0 1/19/2018  8:59 AM   Red%Wound Bed 10 3/9/2018  9:33 AM   Yellow%Wound Bed 60 3/9/2018  9:33 AM   Black%Wound Bed 30 3/9/2018  9:33 AM   Purple%Wound Bed 0 1/19/2018  8:59 AM   Other%Wound Bed 29% green 3/2/2018 10:27 AM   Number of days: 368       Percent of Wound Debrided: 20%    Total Surface Area Debrided:  103 sq cm    Non Pressure Ulcers only:  [] Limited to breakdown of the skin [x] With fat layer exposed  [] With necrosis of muscle  [] With unspecified severity  [] With necrosis of bone  []       Bleeding: Minimal    Hemostasis Achieved: by pressure    Procedural Pain: 2  / 10     Post Procedural Pain: 0 / 10     Response to treatment:  Well tolerated by patient. I have contacted PCP about patient's continued reluctance to consider admission to hospital for antibiotics, debridement ,elevation of extremity. He continues to take inordinate amounts of nonsteroidal antiinflammatory medication in spite of repeated warnings as to the danger of such dosage. Hopefully, at least a PICC line can be inserted today to begin outpatient IV antibiotics as outlined by Dr. Maribel Meza. Dr. Edinson Irvin to see patient and spouse to try to convince him to be admitted to hospital.        Plan:     Treatment Note please see attached Discharge Instructions    Is this Patient a candidate for HBO: No    Written Patient Dismissal Instructions Given       Patient is to return to wound care center in:  1 week(s)    Discharge [facility] Physician Orders and Discharge Instructions  The [address] [place], [address] [place] [address]  Telephone: [phone]    NAME:  Jann Cottrell  YOB: 1965  MEDICAL RECORD NUMBER:  [id number]  DATE:  3/9/2018    Wash hands with soap and water prior to and after every dressing change. Wound Cleansing:   · Do not scrub or use excessive force. · With each dressing change, rinse wounds with 0.9% Saline. (May use wound wash or soft contact solution. Both can be purchased at a local drug store). · If unable to obtain saline, may use a gentle soap and water. · Keep wounds dry in the shower unless otherwise instructed by the physician. Topical Treatments:  Do not apply lotions, creams, or ointments to wound bed unless directed. [] Apply moisturizing lotion to skin surrounding the wound prior to dressing change.  [] Apply antifungal ointment to skin surrounding the wound prior to dressing change.  [] Apply thin film of moisture barrier ointment to skin immediately around wound. [] Other:          Dressings:           Wound Location:right lower leg wounds   [x] Apply Primary Dressing to wound:       [] Foam/Foam with Border(i.e Mepilex) [] Non-adherent (i.e.Mepitel)   [] Alginate with Silver(i.e. Silvercel) [] Alginate   [] Collagen(i.e. Puracol) [] Collagen with Silver(i.e. Candice)   [] Hydrocolloid  [] Hydrafera Blue moistened with saline   [] MediHoney Paste/Gel [] Hydrogel      [] Santyl covered with gauze moisten with saline    [] Bactroban/Mupirocin [] Polysporin  [x] Other: optiocks   Pack wound loosely with  [] Iodoform   [] Plain Packing  [] Saline \"wet to dry\"      [x] Cover and Secure with:     [] Gauze [x] ABD [] Stretch bandage roll [x] Kerlix   [] Coban [] Ace Wrap [] Cover Roll Tape    [] Other:    Avoid contact of tape with skin. [x] Change dressing:  [] Daily    [] Every Other Day [x] Three times per week Monday , wednesday, friday [] Once a week [] Do Not Change Dressing   [] Other:    Edema Control:  Apply: [x] Compression Stocking [x]Right Leg []Left Leg       [x] Elevate leg(s) above the level of the heart for 30 minutes 4-5 times a day and/or when sitting. [x] Avoid prolonged standing in one place. · Compression:  Type: profore 4 layer  ·  Multilayer Compression Wrap Applied in Clinic [x]Right Leg []Left Leg  · Do not get leg(s) with wrap wet. ·  If wraps become too tight call the center or completely remove the wrap. · Elevate leg(s) above the level of the heart when sitting. · Avoid prolonged standing in one place. Dietary:  Important dietary reminders:  1. Increase Protein intake (i.e. Lean meats, fish, eggs, legumes, and yogurt)  2. No added salt  3.  If diabetic, follow a diabetic diet

## 2018-03-10 ENCOUNTER — HOSPITAL ENCOUNTER (OUTPATIENT)
Dept: ONCOLOGY | Age: 53
Discharge: HOME | End: 2018-03-11
Attending: INTERNAL MEDICINE | Admitting: INTERNAL MEDICINE

## 2018-03-10 VITALS — DIASTOLIC BLOOD PRESSURE: 73 MMHG | TEMPERATURE: 97.7 F | SYSTOLIC BLOOD PRESSURE: 124 MMHG | HEART RATE: 86 BPM

## 2018-03-10 DIAGNOSIS — M79.661 PAIN AND SWELLING OF RIGHT LOWER LEG: ICD-10-CM

## 2018-03-10 DIAGNOSIS — M79.89 PAIN AND SWELLING OF RIGHT LOWER LEG: ICD-10-CM

## 2018-03-10 RX ORDER — HEPARIN SODIUM (PORCINE) LOCK FLUSH IV SOLN 100 UNIT/ML 100 UNIT/ML
300 SOLUTION INTRAVENOUS PRN
Status: DISCONTINUED | OUTPATIENT
Start: 2018-03-10 | End: 2018-03-12 | Stop reason: HOSPADM

## 2018-03-10 RX ADMIN — HEPARIN SODIUM (PORCINE) LOCK FLUSH IV SOLN 100 UNIT/ML 300 UNITS: 100 SOLUTION at 10:30

## 2018-03-10 ASSESSMENT — PAIN DESCRIPTION - DESCRIPTORS: DESCRIPTORS: BURNING

## 2018-03-10 ASSESSMENT — PAIN DESCRIPTION - LOCATION: LOCATION: LEG

## 2018-03-10 ASSESSMENT — PAIN SCALES - GENERAL: PAINLEVEL_OUTOF10: 8

## 2018-03-10 ASSESSMENT — PAIN DESCRIPTION - PAIN TYPE: TYPE: ACUTE PAIN

## 2018-03-11 ENCOUNTER — HOSPITAL ENCOUNTER (OUTPATIENT)
Dept: ONCOLOGY | Age: 53
Discharge: HOME | End: 2018-03-12
Attending: INTERNAL MEDICINE | Admitting: INTERNAL MEDICINE

## 2018-03-11 VITALS — TEMPERATURE: 97.1 F | DIASTOLIC BLOOD PRESSURE: 79 MMHG | SYSTOLIC BLOOD PRESSURE: 121 MMHG | HEART RATE: 74 BPM

## 2018-03-11 DIAGNOSIS — M79.661 PAIN AND SWELLING OF RIGHT LOWER LEG: ICD-10-CM

## 2018-03-11 DIAGNOSIS — M79.89 PAIN AND SWELLING OF RIGHT LOWER LEG: ICD-10-CM

## 2018-03-11 RX ORDER — HEPARIN SODIUM (PORCINE) LOCK FLUSH IV SOLN 100 UNIT/ML 100 UNIT/ML
300 SOLUTION INTRAVENOUS PRN
Status: DISCONTINUED | OUTPATIENT
Start: 2018-03-11 | End: 2018-03-13 | Stop reason: HOSPADM

## 2018-03-11 RX ORDER — HEPARIN SODIUM (PORCINE) LOCK FLUSH IV SOLN 100 UNIT/ML 100 UNIT/ML
300 SOLUTION INTRAVENOUS PRN
Status: CANCELLED | OUTPATIENT
Start: 2018-03-11

## 2018-03-11 RX ADMIN — HEPARIN SODIUM (PORCINE) LOCK FLUSH IV SOLN 100 UNIT/ML 300 UNITS: 100 SOLUTION at 09:05

## 2018-03-11 ASSESSMENT — PAIN DESCRIPTION - ORIENTATION: ORIENTATION: LEFT;LOWER

## 2018-03-11 ASSESSMENT — PAIN DESCRIPTION - DESCRIPTORS: DESCRIPTORS: BURNING

## 2018-03-11 ASSESSMENT — PAIN DESCRIPTION - LOCATION: LOCATION: LEG

## 2018-03-11 ASSESSMENT — PAIN SCALES - GENERAL: PAINLEVEL_OUTOF10: 6

## 2018-03-11 ASSESSMENT — PAIN DESCRIPTION - PAIN TYPE: TYPE: ACUTE PAIN

## 2018-03-12 ENCOUNTER — HOSPITAL ENCOUNTER (OUTPATIENT)
Dept: ONCOLOGY | Age: 53
Discharge: HOME | End: 2018-03-13
Attending: INTERNAL MEDICINE | Admitting: INTERNAL MEDICINE

## 2018-03-12 ENCOUNTER — HOSPITAL ENCOUNTER (OUTPATIENT)
Dept: WOUND CARE | Age: 53
Discharge: HOME | End: 2018-03-12
Attending: SPECIALIST | Admitting: SPECIALIST

## 2018-03-12 ENCOUNTER — TELEPHONE (OUTPATIENT)
Dept: INFECTIOUS DISEASES | Age: 53
End: 2018-03-12

## 2018-03-12 VITALS
TEMPERATURE: 97 F | DIASTOLIC BLOOD PRESSURE: 80 MMHG | RESPIRATION RATE: 18 BRPM | SYSTOLIC BLOOD PRESSURE: 119 MMHG | HEART RATE: 71 BPM

## 2018-03-12 VITALS
HEART RATE: 68 BPM | SYSTOLIC BLOOD PRESSURE: 148 MMHG | RESPIRATION RATE: 20 BRPM | DIASTOLIC BLOOD PRESSURE: 78 MMHG | TEMPERATURE: 97.4 F

## 2018-03-12 DIAGNOSIS — M79.89 PAIN AND SWELLING OF RIGHT LOWER LEG: ICD-10-CM

## 2018-03-12 DIAGNOSIS — M79.661 PAIN AND SWELLING OF RIGHT LOWER LEG: ICD-10-CM

## 2018-03-12 RX ORDER — HEPARIN SODIUM (PORCINE) LOCK FLUSH IV SOLN 100 UNIT/ML 100 UNIT/ML
300 SOLUTION INTRAVENOUS PRN
Status: DISCONTINUED | OUTPATIENT
Start: 2018-03-12 | End: 2018-03-14 | Stop reason: HOSPADM

## 2018-03-12 ASSESSMENT — PAIN DESCRIPTION - PAIN TYPE: TYPE: CHRONIC PAIN

## 2018-03-12 ASSESSMENT — PAIN DESCRIPTION - LOCATION
LOCATION: LEG
LOCATION: LEG

## 2018-03-12 ASSESSMENT — PAIN DESCRIPTION - DESCRIPTORS
DESCRIPTORS: ACHING;BURNING;CONSTANT
DESCRIPTORS: BURNING

## 2018-03-12 ASSESSMENT — PAIN DESCRIPTION - ORIENTATION
ORIENTATION: RIGHT
ORIENTATION: LEFT;LOWER

## 2018-03-12 ASSESSMENT — PAIN SCALES - GENERAL
PAINLEVEL_OUTOF10: 4
PAINLEVEL_OUTOF10: 3

## 2018-03-12 ASSESSMENT — PAIN DESCRIPTION - ONSET: ONSET: ON-GOING

## 2018-03-12 ASSESSMENT — PAIN DESCRIPTION - PROGRESSION: CLINICAL_PROGRESSION: NOT CHANGED

## 2018-03-12 NOTE — TELEPHONE ENCOUNTER
Patient notified of new order from Dr. Britt Aguilar for IV Cefepime 2 gm IV daily until 3/28/12. Patient stated he rather proceed with IV medication at [facility] outpatient because its closer to his home. Spoke with Paloma the nurse at the infusion ctr and made her aware of this pt. new orders to stop current Zosyn 3.375 gm and start Cefepime 2 gm IV daily, routine labs continue Q Mondays per DR. Britt Aguilar. Orders faxed to [facility] infusion outpatient ctr and received successfully.

## 2018-03-12 NOTE — TELEPHONE ENCOUNTER
Patient notified to discuss his new IV orders Zosyn 3.375 gm Q 6.  He stated, \" I would not be able to do this infusion because of my work schedule\" patient stated \" I need a medication for one time a day\"

## 2018-03-13 ENCOUNTER — HOSPITAL ENCOUNTER (OUTPATIENT)
Dept: ONCOLOGY | Age: 53
Discharge: HOME | End: 2018-03-14
Attending: INTERNAL MEDICINE | Admitting: INTERNAL MEDICINE

## 2018-03-13 VITALS
TEMPERATURE: 97.8 F | SYSTOLIC BLOOD PRESSURE: 122 MMHG | DIASTOLIC BLOOD PRESSURE: 77 MMHG | HEART RATE: 68 BPM | RESPIRATION RATE: 18 BRPM

## 2018-03-13 DIAGNOSIS — M79.661 PAIN AND SWELLING OF RIGHT LOWER LEG: ICD-10-CM

## 2018-03-13 DIAGNOSIS — M79.89 PAIN AND SWELLING OF RIGHT LOWER LEG: ICD-10-CM

## 2018-03-13 LAB
ANION GAP SERPL CALCULATED.3IONS-SCNC: 12 MMOL/L (ref 3–16)
BASOPHILS ABSOLUTE: 0 K/UL (ref 0–0.2)
BASOPHILS RELATIVE PERCENT: 0.2 %
BUN BLDV-MCNC: 21 MG/DL (ref 7–20)
C-REACTIVE PROTEIN: 41.3 MG/L (ref 0–5.1)
CALCIUM SERPL-MCNC: 8.5 MG/DL (ref 8.3–10.6)
CHLORIDE BLD-SCNC: 111 MMOL/L (ref 99–110)
CO2: 21 MMOL/L (ref 21–32)
CREAT SERPL-MCNC: 0.8 MG/DL (ref 0.9–1.3)
EOSINOPHILS ABSOLUTE: 0.3 K/UL (ref 0–0.6)
EOSINOPHILS RELATIVE PERCENT: 5.4 %
GFR AFRICAN AMERICAN: >60
GFR NON-AFRICAN AMERICAN: >60
GLUCOSE BLD-MCNC: 89 MG/DL (ref 70–99)
HCT VFR BLD CALC: 28.2 % (ref 40.5–52.5)
HEMOGLOBIN: 9.2 G/DL (ref 13.5–17.5)
LYMPHOCYTES ABSOLUTE: 1.3 K/UL (ref 1–5.1)
LYMPHOCYTES RELATIVE PERCENT: 29.1 %
MCH RBC QN AUTO: 24.8 PG (ref 26–34)
MCHC RBC AUTO-ENTMCNC: 32.7 G/DL (ref 31–36)
MCV RBC AUTO: 76.1 FL (ref 80–100)
MONOCYTES ABSOLUTE: 0.5 K/UL (ref 0–1.3)
MONOCYTES RELATIVE PERCENT: 11.6 %
NEUTROPHILS ABSOLUTE: 2.5 K/UL (ref 1.7–7.7)
NEUTROPHILS RELATIVE PERCENT: 53.7 %
PDW BLD-RTO: 17.3 % (ref 12.4–15.4)
PLATELET # BLD: 290 K/UL (ref 135–450)
PMV BLD AUTO: 6 FL (ref 5–10.5)
POTASSIUM SERPL-SCNC: 3.7 MMOL/L (ref 3.5–5.1)
RBC # BLD: 3.7 M/UL (ref 4.2–5.9)
SEDIMENTATION RATE, ERYTHROCYTE: 84 MM/HR (ref 0–20)
SODIUM BLD-SCNC: 144 MMOL/L (ref 136–145)
WBC # BLD: 4.6 K/UL (ref 4–11)

## 2018-03-13 RX ORDER — HEPARIN SODIUM (PORCINE) LOCK FLUSH IV SOLN 100 UNIT/ML 100 UNIT/ML
300 SOLUTION INTRAVENOUS PRN
Status: ACTIVE | OUTPATIENT
Start: 2018-03-13 | End: 2018-03-13

## 2018-03-13 RX ADMIN — HEPARIN SODIUM (PORCINE) LOCK FLUSH IV SOLN 100 UNIT/ML 300 UNITS: 100 SOLUTION at 09:10

## 2018-03-13 ASSESSMENT — PAIN DESCRIPTION - ORIENTATION: ORIENTATION: RIGHT

## 2018-03-13 ASSESSMENT — PAIN SCALES - GENERAL: PAINLEVEL_OUTOF10: 2

## 2018-03-13 ASSESSMENT — PAIN DESCRIPTION - PROGRESSION: CLINICAL_PROGRESSION: NOT CHANGED

## 2018-03-13 ASSESSMENT — PAIN DESCRIPTION - ONSET: ONSET: ON-GOING

## 2018-03-13 ASSESSMENT — PAIN DESCRIPTION - LOCATION: LOCATION: LEG

## 2018-03-13 ASSESSMENT — PAIN DESCRIPTION - DESCRIPTORS: DESCRIPTORS: BURNING

## 2018-03-14 ENCOUNTER — HOSPITAL ENCOUNTER (OUTPATIENT)
Dept: ONCOLOGY | Age: 53
Discharge: HOME | End: 2018-03-15
Attending: INTERNAL MEDICINE | Admitting: INTERNAL MEDICINE

## 2018-03-14 VITALS
SYSTOLIC BLOOD PRESSURE: 122 MMHG | HEART RATE: 67 BPM | DIASTOLIC BLOOD PRESSURE: 76 MMHG | TEMPERATURE: 97.3 F | RESPIRATION RATE: 16 BRPM

## 2018-03-14 DIAGNOSIS — M79.89 PAIN AND SWELLING OF RIGHT LOWER LEG: ICD-10-CM

## 2018-03-14 DIAGNOSIS — M79.661 PAIN AND SWELLING OF RIGHT LOWER LEG: ICD-10-CM

## 2018-03-14 RX ORDER — HEPARIN SODIUM (PORCINE) LOCK FLUSH IV SOLN 100 UNIT/ML 100 UNIT/ML
300 SOLUTION INTRAVENOUS PRN
Status: DISCONTINUED | OUTPATIENT
Start: 2018-03-14 | End: 2018-03-16 | Stop reason: HOSPADM

## 2018-03-14 RX ADMIN — HEPARIN SODIUM (PORCINE) LOCK FLUSH IV SOLN 100 UNIT/ML 300 UNITS: 100 SOLUTION at 10:02

## 2018-03-14 NOTE — PLAN OF CARE
Problem: Falls - Risk of:  Goal: Will remain free from falls  Will remain free from falls   Outcome: Ongoing  Patient arrived ambulatory to OP Infusion for scheduled Cefepime 2 grams which was given IVP over 7 minutes via R arm PICC. Tolerated well. Wound to R lower leg dressed and dressing changes are done per wound care. VSS. Gait steady. Fall precautions reviewed. Verbalized understanding. D/C'd ambulatory to work.

## 2018-03-15 ENCOUNTER — HOSPITAL ENCOUNTER (OUTPATIENT)
Dept: ONCOLOGY | Age: 53
Discharge: HOME | End: 2018-03-16
Attending: INTERNAL MEDICINE | Admitting: INTERNAL MEDICINE

## 2018-03-15 VITALS
DIASTOLIC BLOOD PRESSURE: 87 MMHG | HEART RATE: 74 BPM | SYSTOLIC BLOOD PRESSURE: 129 MMHG | RESPIRATION RATE: 18 BRPM | TEMPERATURE: 98.4 F

## 2018-03-15 DIAGNOSIS — M79.89 PAIN AND SWELLING OF RIGHT LOWER LEG: ICD-10-CM

## 2018-03-15 DIAGNOSIS — M79.661 PAIN AND SWELLING OF RIGHT LOWER LEG: ICD-10-CM

## 2018-03-15 RX ORDER — HEPARIN SODIUM (PORCINE) LOCK FLUSH IV SOLN 100 UNIT/ML 100 UNIT/ML
300 SOLUTION INTRAVENOUS PRN
Status: CANCELLED
Start: 2018-03-16

## 2018-03-15 RX ORDER — HEPARIN SODIUM (PORCINE) LOCK FLUSH IV SOLN 100 UNIT/ML 100 UNIT/ML
300 SOLUTION INTRAVENOUS PRN
Status: CANCELLED
Start: 2018-03-15

## 2018-03-15 RX ORDER — HEPARIN SODIUM (PORCINE) LOCK FLUSH IV SOLN 100 UNIT/ML 100 UNIT/ML
300 SOLUTION INTRAVENOUS PRN
Status: DISCONTINUED | OUTPATIENT
Start: 2018-03-15 | End: 2018-03-17 | Stop reason: HOSPADM

## 2018-03-15 RX ADMIN — HEPARIN SODIUM (PORCINE) LOCK FLUSH IV SOLN 100 UNIT/ML 300 UNITS: 100 SOLUTION at 09:45

## 2018-03-16 ENCOUNTER — HOSPITAL ENCOUNTER (OUTPATIENT)
Dept: WOUND CARE | Age: 53
Discharge: HOME | End: 2018-03-16
Attending: SPECIALIST | Admitting: SPECIALIST

## 2018-03-16 ENCOUNTER — HOSPITAL ENCOUNTER (OUTPATIENT)
Dept: ONCOLOGY | Age: 53
Discharge: HOME | End: 2018-03-17
Attending: INTERNAL MEDICINE | Admitting: INTERNAL MEDICINE

## 2018-03-16 VITALS
SYSTOLIC BLOOD PRESSURE: 126 MMHG | RESPIRATION RATE: 16 BRPM | HEART RATE: 70 BPM | DIASTOLIC BLOOD PRESSURE: 83 MMHG | TEMPERATURE: 98.5 F

## 2018-03-16 VITALS
HEART RATE: 76 BPM | DIASTOLIC BLOOD PRESSURE: 80 MMHG | SYSTOLIC BLOOD PRESSURE: 146 MMHG | RESPIRATION RATE: 18 BRPM | TEMPERATURE: 97.4 F

## 2018-03-16 DIAGNOSIS — I87.311 IDIOPATHIC CHRONIC VENOUS HYPERTENSION OF RIGHT LOWER EXTREMITY WITH ULCER (HCC): ICD-10-CM

## 2018-03-16 DIAGNOSIS — M79.89 PAIN AND SWELLING OF RIGHT LOWER LEG: ICD-10-CM

## 2018-03-16 DIAGNOSIS — I82.220 THROMBOSIS OF INFERIOR VENA CAVA (HCC): ICD-10-CM

## 2018-03-16 DIAGNOSIS — Z79.01 CHRONIC ANTICOAGULATION: ICD-10-CM

## 2018-03-16 DIAGNOSIS — M79.661 PAIN AND SWELLING OF RIGHT LOWER LEG: ICD-10-CM

## 2018-03-16 DIAGNOSIS — L97.919 IDIOPATHIC CHRONIC VENOUS HYPERTENSION OF RIGHT LOWER EXTREMITY WITH ULCER (HCC): ICD-10-CM

## 2018-03-16 DIAGNOSIS — I89.0 CHRONIC ACQUIRED LYMPHEDEMA: Primary | ICD-10-CM

## 2018-03-16 DIAGNOSIS — R60.0 LOCALIZED EDEMA: ICD-10-CM

## 2018-03-16 PROCEDURE — 11045 DBRDMT SUBQ TISS EACH ADDL: CPT | Performed by: SPECIALIST

## 2018-03-16 PROCEDURE — 11042 DBRDMT SUBQ TIS 1ST 20SQCM/<: CPT | Performed by: SPECIALIST

## 2018-03-16 PROCEDURE — 29581 APPL MULTLAYER CMPRN SYS LEG: CPT | Performed by: SPECIALIST

## 2018-03-16 RX ORDER — HEPARIN SODIUM (PORCINE) LOCK FLUSH IV SOLN 100 UNIT/ML 100 UNIT/ML
300 SOLUTION INTRAVENOUS PRN
Status: CANCELLED
Start: 2018-03-16

## 2018-03-16 RX ORDER — HEPARIN SODIUM (PORCINE) LOCK FLUSH IV SOLN 100 UNIT/ML 100 UNIT/ML
300 SOLUTION INTRAVENOUS PRN
Status: DISCONTINUED | OUTPATIENT
Start: 2018-03-16 | End: 2018-03-18 | Stop reason: HOSPADM

## 2018-03-16 RX ORDER — LIDOCAINE HYDROCHLORIDE 40 MG/ML
2.5 SOLUTION TOPICAL ONCE
Status: DISCONTINUED | OUTPATIENT
Start: 2018-03-16 | End: 2018-03-17 | Stop reason: HOSPADM

## 2018-03-16 RX ADMIN — HEPARIN SODIUM (PORCINE) LOCK FLUSH IV SOLN 100 UNIT/ML 300 UNITS: 100 SOLUTION at 09:46

## 2018-03-16 ASSESSMENT — PAIN DESCRIPTION - FREQUENCY: FREQUENCY: CONTINUOUS

## 2018-03-16 ASSESSMENT — PAIN DESCRIPTION - PROGRESSION
CLINICAL_PROGRESSION: NOT CHANGED
CLINICAL_PROGRESSION: NOT CHANGED

## 2018-03-16 ASSESSMENT — PAIN DESCRIPTION - ONSET
ONSET: ON-GOING
ONSET: ON-GOING

## 2018-03-16 ASSESSMENT — PAIN DESCRIPTION - LOCATION
LOCATION: LEG
LOCATION: LEG

## 2018-03-16 ASSESSMENT — PAIN DESCRIPTION - DESCRIPTORS
DESCRIPTORS: BURNING
DESCRIPTORS: BURNING

## 2018-03-16 ASSESSMENT — PAIN SCALES - GENERAL
PAINLEVEL_OUTOF10: 3
PAINLEVEL_OUTOF10: 2

## 2018-03-16 ASSESSMENT — PAIN DESCRIPTION - ORIENTATION
ORIENTATION: RIGHT
ORIENTATION: RIGHT

## 2018-03-16 ASSESSMENT — PAIN DESCRIPTION - PAIN TYPE: TYPE: CHRONIC PAIN

## 2018-03-16 NOTE — PROGRESS NOTES
[facility]  Progress Note and Procedure Note      Jose Loyd  MEDICAL RECORD NUMBER:  [id number]  AGE: 46 y.o. GENDER: male  : 1965  EPISODE DATE:  3/16/2018    Subjective:     Chief Complaint   Patient presents with    Wound Check     right lower leg         HISTORY of PRESENT ILLNESS HPI     Jose Loyd is a 46 y.o. male who presents today for wound/ulcer evaluation. 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 Underwent unsuccessfull dilatation of IVC at [facility] with persistent massive wounds of RLE. Pain Assessment:  Wound/Ulcer Pain Timing/Severity: intermittent  Quality of pain: sharp  Severity:  4 / 10   Modifying Factors: None  Associated Signs/Symptoms: edema, drainage and odor    Ulcer Identification:  Ulcer Type: venous and lymphedema  Contributing Factors: edema, lymphedema, obesity and anticoagulation therapy    Objective:      BP (!) 146/80   Pulse 76   Temp 97.4 °F (36.3 °C) (Oral)   Resp 18     Wt Readings from Last 3 Encounters:   17 243 lb 6.4 oz (110.4 kg)   17 247 lb (112 kg)   17 239 lb (108.4 kg)       PHYSICAL EXAM    Extremities: no cyanosis and no clubbing, large exudative wounds of RLE  See measurements below. Assessment:      No diagnosis found. Procedure Note  Indications:  Based on my examination of this patient's wound(s) today, sharp excision is required to promote healing and evaluate the extent healing. Performed by: Pablo Iverson MD    Consent obtained? Yes    Time out taken: Yes    Pain Control: Anesthetic: 4% Lidocaine Liquid Topical     Debridement:Excisional Debridement    Using curette the wound was sharply debrided    down through and including the removal of  epidermis, dermis and subcutaneous tissue.     Devitalized Tissue Debrided:  fibrin, biofilm and slough      Pre Debridement Measurements:  Are located in the Wound Documentation Flow Sheet   Wound #: 1 and 2     Post  Debridement Measurements:  Wound 03/06/17 Leg Right; Lower; Anterior #1  venous/lymphedema (stage 3) (Active)   Wound Image   3/9/2018  9:33 AM   Wound Type Wound 3/16/2018 10:06 AM   Wound Venous 1/26/2018  9:40 AM   Dressing Status Old drainage 2/12/2018  9:57 AM   Dressing/Treatment Other (Comment) 3/5/2018 10:07 AM   Wound Cleansed Rinsed/Irrigated with saline 3/16/2018 10:06 AM   Dressing Change Due 02/16/18 2/12/2018  9:57 AM   Wound Length (cm) 14.5 cm 3/16/2018 11:05 AM   Wound Width (cm) 11.1 cm 3/16/2018 11:05 AM   Wound Depth (cm)  0.4 3/16/2018 11:05 AM   Calculated Wound Size (cm^2) (l*w) 160.95 cm^2 3/16/2018 11:05 AM   Change in Wound Size % (l*w) -521.43 3/16/2018 11:05 AM   Distance Tunneling (cm) 0 cm 3/16/2018 10:06 AM   Tunneling Position ___ O'Clock 0 12/1/2017 10:07 AM   Undermining Starts ___ O'Clock 0 1/19/2018  8:59 AM   Undermining Ends___ O'Clock 0 12/1/2017 10:07 AM   Undermining Maxium Distance (cm) 0 3/16/2018 10:06 AM   Wound Assessment Yellow;Granulation tissue;Red;Slough 3/16/2018 10:06 AM   Drainage Amount Copious 3/16/2018 10:06 AM   Drainage Description Yellow 3/16/2018 10:06 AM   Odor None 3/16/2018 10:06 AM   Margins Defined edges 3/16/2018 10:06 AM   Kiah-wound Assessment Excoriated;Pink;Fragile 3/16/2018 10:06 AM   Non-staged Wound Description Full thickness 3/16/2018 10:06 AM   [place]%Wound Bed 0 3/16/2018 10:06 AM   Red%Wound Bed 60 3/16/2018 10:06 AM   Yellow%Wound Bed 40 3/16/2018 10:06 AM   Black%Wound Bed 0 3/16/2018 10:06 AM   Purple%Wound Bed 0 1/19/2018  8:59 AM   Other%Wound Bed 30 % pale green 3/12/2018 10:05 AM   Number of days: 375       Wound 03/06/17 Leg Right; Lower;Medial #2 venous/ lymphedema (stage 3)- combined with wound #3 (Active)   Wound Image   3/9/2018  9:33 AM   Wound Type Wound 3/16/2018 10:06 AM   Wound Venous 1/10/2018  8:44 AM   Dressing Status Old drainage 2/12/2018  9:57 AM   Dressing Changed

## 2018-03-17 ENCOUNTER — HOSPITAL ENCOUNTER (OUTPATIENT)
Dept: ONCOLOGY | Age: 53
Discharge: HOME | End: 2018-03-18
Attending: INTERNAL MEDICINE | Admitting: INTERNAL MEDICINE

## 2018-03-17 VITALS
RESPIRATION RATE: 18 BRPM | HEART RATE: 71 BPM | TEMPERATURE: 98.5 F | SYSTOLIC BLOOD PRESSURE: 124 MMHG | DIASTOLIC BLOOD PRESSURE: 70 MMHG

## 2018-03-17 DIAGNOSIS — M79.661 PAIN AND SWELLING OF RIGHT LOWER LEG: ICD-10-CM

## 2018-03-17 DIAGNOSIS — M79.89 PAIN AND SWELLING OF RIGHT LOWER LEG: ICD-10-CM

## 2018-03-17 RX ORDER — HEPARIN SODIUM (PORCINE) LOCK FLUSH IV SOLN 100 UNIT/ML 100 UNIT/ML
300 SOLUTION INTRAVENOUS PRN
Status: DISCONTINUED | OUTPATIENT
Start: 2018-03-17 | End: 2018-03-19 | Stop reason: HOSPADM

## 2018-03-17 RX ORDER — HEPARIN SODIUM (PORCINE) LOCK FLUSH IV SOLN 100 UNIT/ML 100 UNIT/ML
300 SOLUTION INTRAVENOUS PRN
Status: CANCELLED
Start: 2018-03-17

## 2018-03-17 RX ADMIN — HEPARIN SODIUM (PORCINE) LOCK FLUSH IV SOLN 100 UNIT/ML 300 UNITS: 100 SOLUTION at 09:00

## 2018-03-17 NOTE — PLAN OF CARE
Problem: KNOWLEDGE DEFICIT  Goal: Patient/S.O. demonstrates understanding of disease process, treatment plan, medications, and discharge instructions. Outcome: Completed Date Met: 03/17/18  Patient received scheduled Cefepime via R arm PICC which he tolerated well. Dressing changed per protocol. Site without redness, edema or drainage. Verbalized understanding of d/c instructions. D/C'd ambulatory to work.   To return in AM for Cefepime which he is scheduled to receive daily through 3/28/18

## 2018-03-18 ENCOUNTER — HOSPITAL ENCOUNTER (OUTPATIENT)
Dept: ONCOLOGY | Age: 53
Discharge: HOME | End: 2018-03-19
Attending: INTERNAL MEDICINE | Admitting: INTERNAL MEDICINE

## 2018-03-18 VITALS
DIASTOLIC BLOOD PRESSURE: 79 MMHG | SYSTOLIC BLOOD PRESSURE: 123 MMHG | TEMPERATURE: 97.3 F | HEART RATE: 80 BPM | RESPIRATION RATE: 18 BRPM

## 2018-03-18 DIAGNOSIS — M79.661 PAIN AND SWELLING OF RIGHT LOWER LEG: ICD-10-CM

## 2018-03-18 DIAGNOSIS — M79.89 PAIN AND SWELLING OF RIGHT LOWER LEG: ICD-10-CM

## 2018-03-18 RX ORDER — HEPARIN SODIUM (PORCINE) LOCK FLUSH IV SOLN 100 UNIT/ML 100 UNIT/ML
300 SOLUTION INTRAVENOUS PRN
Status: DISCONTINUED | OUTPATIENT
Start: 2018-03-18 | End: 2018-03-20 | Stop reason: HOSPADM

## 2018-03-18 RX ORDER — HEPARIN SODIUM (PORCINE) LOCK FLUSH IV SOLN 100 UNIT/ML 100 UNIT/ML
300 SOLUTION INTRAVENOUS PRN
Status: CANCELLED
Start: 2018-03-18

## 2018-03-18 RX ADMIN — HEPARIN SODIUM (PORCINE) LOCK FLUSH IV SOLN 100 UNIT/ML 300 UNITS: 100 SOLUTION at 08:42

## 2018-03-18 ASSESSMENT — PAIN SCALES - GENERAL: PAINLEVEL_OUTOF10: 2

## 2018-03-18 NOTE — PLAN OF CARE
Problem: KNOWLEDGE DEFICIT  Goal: Patient/S.O. demonstrates understanding of disease process, treatment plan, medications, and discharge instructions. Outcome: Ongoing  Cefepime given via R arm PICC over 5 min. Tolerated well. Dressing to R lower leg intact. Scheduled to have dressing changed 3/19/18 per wound care. Verbalized understanding of d/c instructions. D/C'd ambulatory to work.

## 2018-03-19 ENCOUNTER — HOSPITAL ENCOUNTER (OUTPATIENT)
Dept: ONCOLOGY | Age: 53
Discharge: HOME | End: 2018-03-20
Attending: INTERNAL MEDICINE | Admitting: INTERNAL MEDICINE

## 2018-03-19 ENCOUNTER — HOSPITAL ENCOUNTER (OUTPATIENT)
Dept: WOUND CARE | Age: 53
Discharge: HOME | End: 2018-03-19
Attending: SPECIALIST | Admitting: SPECIALIST

## 2018-03-19 VITALS
RESPIRATION RATE: 18 BRPM | SYSTOLIC BLOOD PRESSURE: 143 MMHG | TEMPERATURE: 97.7 F | DIASTOLIC BLOOD PRESSURE: 73 MMHG | HEART RATE: 78 BPM

## 2018-03-19 VITALS
RESPIRATION RATE: 18 BRPM | SYSTOLIC BLOOD PRESSURE: 126 MMHG | HEART RATE: 78 BPM | DIASTOLIC BLOOD PRESSURE: 72 MMHG | TEMPERATURE: 98.2 F

## 2018-03-19 DIAGNOSIS — M79.661 PAIN AND SWELLING OF RIGHT LOWER LEG: ICD-10-CM

## 2018-03-19 DIAGNOSIS — M79.89 PAIN AND SWELLING OF RIGHT LOWER LEG: ICD-10-CM

## 2018-03-19 LAB
ANION GAP SERPL CALCULATED.3IONS-SCNC: 11 MMOL/L (ref 3–16)
BASOPHILS ABSOLUTE: 0 K/UL (ref 0–0.2)
BASOPHILS RELATIVE PERCENT: 0.1 %
BUN BLDV-MCNC: 20 MG/DL (ref 7–20)
C-REACTIVE PROTEIN: 44.9 MG/L (ref 0–5.1)
CALCIUM SERPL-MCNC: 8.4 MG/DL (ref 8.3–10.6)
CHLORIDE BLD-SCNC: 110 MMOL/L (ref 99–110)
CO2: 21 MMOL/L (ref 21–32)
CREAT SERPL-MCNC: 0.9 MG/DL (ref 0.9–1.3)
EOSINOPHILS ABSOLUTE: 0.1 K/UL (ref 0–0.6)
EOSINOPHILS RELATIVE PERCENT: 2.5 %
GFR AFRICAN AMERICAN: >60
GFR NON-AFRICAN AMERICAN: >60
GLUCOSE BLD-MCNC: 114 MG/DL (ref 70–99)
HCT VFR BLD CALC: 29.1 % (ref 40.5–52.5)
HEMOGLOBIN: 9.4 G/DL (ref 13.5–17.5)
LYMPHOCYTES ABSOLUTE: 0.9 K/UL (ref 1–5.1)
LYMPHOCYTES RELATIVE PERCENT: 15.6 %
MCH RBC QN AUTO: 25 PG (ref 26–34)
MCHC RBC AUTO-ENTMCNC: 32.3 G/DL (ref 31–36)
MCV RBC AUTO: 77.4 FL (ref 80–100)
MONOCYTES ABSOLUTE: 0.5 K/UL (ref 0–1.3)
MONOCYTES RELATIVE PERCENT: 8.2 %
NEUTROPHILS ABSOLUTE: 4.1 K/UL (ref 1.7–7.7)
NEUTROPHILS RELATIVE PERCENT: 73.6 %
PDW BLD-RTO: 17.7 % (ref 12.4–15.4)
PLATELET # BLD: 240 K/UL (ref 135–450)
PMV BLD AUTO: 6.4 FL (ref 5–10.5)
POTASSIUM SERPL-SCNC: 3.7 MMOL/L (ref 3.5–5.1)
RBC # BLD: 3.75 M/UL (ref 4.2–5.9)
SEDIMENTATION RATE, ERYTHROCYTE: 66 MM/HR (ref 0–20)
SODIUM BLD-SCNC: 142 MMOL/L (ref 136–145)
WBC # BLD: 5.6 K/UL (ref 4–11)

## 2018-03-19 RX ORDER — HEPARIN SODIUM (PORCINE) LOCK FLUSH IV SOLN 100 UNIT/ML 100 UNIT/ML
300 SOLUTION INTRAVENOUS PRN
Status: DISCONTINUED | OUTPATIENT
Start: 2018-03-19 | End: 2018-03-21 | Stop reason: HOSPADM

## 2018-03-19 RX ORDER — HEPARIN SODIUM (PORCINE) LOCK FLUSH IV SOLN 100 UNIT/ML 100 UNIT/ML
300 SOLUTION INTRAVENOUS PRN
Status: CANCELLED
Start: 2018-03-19

## 2018-03-19 RX ADMIN — HEPARIN SODIUM (PORCINE) LOCK FLUSH IV SOLN 100 UNIT/ML 300 UNITS: 100 SOLUTION at 09:05

## 2018-03-19 ASSESSMENT — PAIN DESCRIPTION - LOCATION: LOCATION: LEG

## 2018-03-19 ASSESSMENT — PAIN DESCRIPTION - FREQUENCY: FREQUENCY: CONTINUOUS

## 2018-03-19 ASSESSMENT — PAIN DESCRIPTION - PROGRESSION: CLINICAL_PROGRESSION: NOT CHANGED

## 2018-03-19 ASSESSMENT — PAIN DESCRIPTION - PAIN TYPE: TYPE: CHRONIC PAIN

## 2018-03-19 ASSESSMENT — PAIN DESCRIPTION - ONSET: ONSET: ON-GOING

## 2018-03-19 ASSESSMENT — PAIN DESCRIPTION - ORIENTATION: ORIENTATION: RIGHT

## 2018-03-19 ASSESSMENT — PAIN DESCRIPTION - DESCRIPTORS: DESCRIPTORS: BURNING

## 2018-03-20 ENCOUNTER — HOSPITAL ENCOUNTER (OUTPATIENT)
Dept: ONCOLOGY | Age: 53
Discharge: HOME | End: 2018-03-21
Attending: INTERNAL MEDICINE | Admitting: INTERNAL MEDICINE

## 2018-03-20 VITALS
TEMPERATURE: 98.4 F | DIASTOLIC BLOOD PRESSURE: 73 MMHG | RESPIRATION RATE: 18 BRPM | HEART RATE: 76 BPM | SYSTOLIC BLOOD PRESSURE: 118 MMHG

## 2018-03-20 DIAGNOSIS — M79.661 PAIN AND SWELLING OF RIGHT LOWER LEG: ICD-10-CM

## 2018-03-20 DIAGNOSIS — M79.89 PAIN AND SWELLING OF RIGHT LOWER LEG: ICD-10-CM

## 2018-03-20 RX ORDER — HEPARIN SODIUM (PORCINE) LOCK FLUSH IV SOLN 100 UNIT/ML 100 UNIT/ML
300 SOLUTION INTRAVENOUS PRN
Status: CANCELLED
Start: 2018-03-20

## 2018-03-20 RX ORDER — HEPARIN SODIUM (PORCINE) LOCK FLUSH IV SOLN 100 UNIT/ML 100 UNIT/ML
300 SOLUTION INTRAVENOUS PRN
Status: DISCONTINUED | OUTPATIENT
Start: 2018-03-20 | End: 2018-03-22 | Stop reason: HOSPADM

## 2018-03-20 RX ADMIN — HEPARIN SODIUM (PORCINE) LOCK FLUSH IV SOLN 100 UNIT/ML 300 UNITS: 100 SOLUTION at 09:16

## 2018-03-21 ENCOUNTER — HOSPITAL ENCOUNTER (OUTPATIENT)
Dept: ONCOLOGY | Age: 53
Discharge: HOME | End: 2018-03-22
Attending: INTERNAL MEDICINE | Admitting: INTERNAL MEDICINE

## 2018-03-21 VITALS
SYSTOLIC BLOOD PRESSURE: 119 MMHG | RESPIRATION RATE: 16 BRPM | TEMPERATURE: 98.3 F | DIASTOLIC BLOOD PRESSURE: 77 MMHG | HEART RATE: 74 BPM

## 2018-03-21 DIAGNOSIS — M79.89 PAIN AND SWELLING OF RIGHT LOWER LEG: ICD-10-CM

## 2018-03-21 DIAGNOSIS — M79.661 PAIN AND SWELLING OF RIGHT LOWER LEG: ICD-10-CM

## 2018-03-21 RX ORDER — HEPARIN SODIUM (PORCINE) LOCK FLUSH IV SOLN 100 UNIT/ML 100 UNIT/ML
300 SOLUTION INTRAVENOUS PRN
Status: CANCELLED
Start: 2018-03-21

## 2018-03-21 RX ORDER — HEPARIN SODIUM (PORCINE) LOCK FLUSH IV SOLN 100 UNIT/ML 100 UNIT/ML
300 SOLUTION INTRAVENOUS PRN
Status: DISCONTINUED | OUTPATIENT
Start: 2018-03-21 | End: 2018-03-23 | Stop reason: HOSPADM

## 2018-03-21 RX ADMIN — HEPARIN SODIUM (PORCINE) LOCK FLUSH IV SOLN 100 UNIT/ML 300 UNITS: 100 SOLUTION at 13:12

## 2018-03-22 ENCOUNTER — HOSPITAL ENCOUNTER (OUTPATIENT)
Dept: ONCOLOGY | Age: 53
Discharge: HOME | End: 2018-03-23
Attending: INTERNAL MEDICINE | Admitting: INTERNAL MEDICINE

## 2018-03-22 VITALS
RESPIRATION RATE: 18 BRPM | TEMPERATURE: 98 F | SYSTOLIC BLOOD PRESSURE: 127 MMHG | DIASTOLIC BLOOD PRESSURE: 79 MMHG | HEART RATE: 82 BPM

## 2018-03-22 RX ORDER — HEPARIN SODIUM (PORCINE) LOCK FLUSH IV SOLN 100 UNIT/ML 100 UNIT/ML
300 SOLUTION INTRAVENOUS PRN
Status: DISCONTINUED | OUTPATIENT
Start: 2018-03-22 | End: 2018-03-24 | Stop reason: HOSPADM

## 2018-03-22 RX ADMIN — HEPARIN SODIUM (PORCINE) LOCK FLUSH IV SOLN 100 UNIT/ML 300 UNITS: 100 SOLUTION at 09:13

## 2018-03-22 NOTE — PROGRESS NOTES
Pt seen and assessed [facility] today for cefepime injection per orders from Dr. Hernando Rosen. Pt tolerated infusion well and without incident. Pt verbalizes understanding of discharge instructions. Discharged ambulatory to home.

## 2018-03-23 ENCOUNTER — HOSPITAL ENCOUNTER (OUTPATIENT)
Dept: WOUND CARE | Age: 53
Discharge: HOME | End: 2018-03-23
Attending: SPECIALIST | Admitting: SPECIALIST

## 2018-03-23 ENCOUNTER — HOSPITAL ENCOUNTER (OUTPATIENT)
Dept: ONCOLOGY | Age: 53
Discharge: HOME | End: 2018-03-24
Attending: INTERNAL MEDICINE | Admitting: INTERNAL MEDICINE

## 2018-03-23 VITALS
RESPIRATION RATE: 18 BRPM | HEART RATE: 88 BPM | SYSTOLIC BLOOD PRESSURE: 149 MMHG | TEMPERATURE: 98.2 F | DIASTOLIC BLOOD PRESSURE: 86 MMHG

## 2018-03-23 VITALS
TEMPERATURE: 97.5 F | SYSTOLIC BLOOD PRESSURE: 125 MMHG | HEART RATE: 69 BPM | DIASTOLIC BLOOD PRESSURE: 83 MMHG | RESPIRATION RATE: 16 BRPM

## 2018-03-23 DIAGNOSIS — I87.311 IDIOPATHIC CHRONIC VENOUS HYPERTENSION OF RIGHT LOWER EXTREMITY WITH ULCER (HCC): Primary | ICD-10-CM

## 2018-03-23 DIAGNOSIS — I82.220 THROMBOSIS OF INFERIOR VENA CAVA (HCC): ICD-10-CM

## 2018-03-23 DIAGNOSIS — M79.89 PAIN AND SWELLING OF RIGHT LOWER LEG: ICD-10-CM

## 2018-03-23 DIAGNOSIS — L97.919 IDIOPATHIC CHRONIC VENOUS HYPERTENSION OF RIGHT LOWER EXTREMITY WITH ULCER (HCC): Primary | ICD-10-CM

## 2018-03-23 DIAGNOSIS — I89.0 CHRONIC ACQUIRED LYMPHEDEMA: ICD-10-CM

## 2018-03-23 DIAGNOSIS — M79.661 PAIN AND SWELLING OF RIGHT LOWER LEG: ICD-10-CM

## 2018-03-23 DIAGNOSIS — I82.423 THROMBOSIS OF BOTH ILIAC VEINS (HCC): ICD-10-CM

## 2018-03-23 PROCEDURE — 11042 DBRDMT SUBQ TIS 1ST 20SQCM/<: CPT | Performed by: SPECIALIST

## 2018-03-23 PROCEDURE — 29581 APPL MULTLAYER CMPRN SYS LEG: CPT | Performed by: SPECIALIST

## 2018-03-23 PROCEDURE — 11045 DBRDMT SUBQ TISS EACH ADDL: CPT | Performed by: SPECIALIST

## 2018-03-23 RX ORDER — HEPARIN SODIUM (PORCINE) LOCK FLUSH IV SOLN 100 UNIT/ML 100 UNIT/ML
300 SOLUTION INTRAVENOUS PRN
Status: CANCELLED
Start: 2018-03-23

## 2018-03-23 RX ORDER — HEPARIN SODIUM (PORCINE) LOCK FLUSH IV SOLN 100 UNIT/ML 100 UNIT/ML
300 SOLUTION INTRAVENOUS PRN
Status: DISCONTINUED | OUTPATIENT
Start: 2018-03-23 | End: 2018-03-25 | Stop reason: HOSPADM

## 2018-03-23 RX ORDER — LIDOCAINE HYDROCHLORIDE 40 MG/ML
2.5 SOLUTION TOPICAL ONCE
Status: COMPLETED | OUTPATIENT
Start: 2018-03-23 | End: 2018-03-23

## 2018-03-23 RX ADMIN — HEPARIN SODIUM (PORCINE) LOCK FLUSH IV SOLN 100 UNIT/ML 300 UNITS: 100 SOLUTION at 10:02

## 2018-03-23 RX ADMIN — LIDOCAINE HYDROCHLORIDE 2.5 ML: 40 SOLUTION TOPICAL at 08:14

## 2018-03-23 ASSESSMENT — PAIN DESCRIPTION - LOCATION: LOCATION: LEG

## 2018-03-23 ASSESSMENT — PAIN DESCRIPTION - PAIN TYPE: TYPE: ACUTE PAIN;CHRONIC PAIN

## 2018-03-23 ASSESSMENT — PAIN SCALES - GENERAL: PAINLEVEL_OUTOF10: 5

## 2018-03-23 ASSESSMENT — PAIN DESCRIPTION - ORIENTATION: ORIENTATION: RIGHT

## 2018-03-23 ASSESSMENT — PAIN DESCRIPTION - ONSET: ONSET: ON-GOING

## 2018-03-23 ASSESSMENT — PAIN DESCRIPTION - FREQUENCY: FREQUENCY: CONTINUOUS

## 2018-03-23 ASSESSMENT — PAIN DESCRIPTION - PROGRESSION: CLINICAL_PROGRESSION: NOT CHANGED

## 2018-03-23 ASSESSMENT — PAIN DESCRIPTION - DESCRIPTORS: DESCRIPTORS: BURNING

## 2018-03-23 NOTE — PLAN OF CARE
Problem: KNOWLEDGE DEFICIT  Goal: Patient/S.O. demonstrates understanding of disease process, treatment plan, medications, and discharge instructions. Outcome: Ongoing  Pt seen and assessed [address] today for cefepime injection per orders from Dr Vanessa Walton. Pt tolerated IVP injection through right arm PICC well and without incident. Pt verbalizes understanding of discharge instructions. Discharged ambulatory to home with self. Problem: Falls - Risk of:  Goal: Will remain free from falls  Will remain free from falls   Outcome: Ongoing  Pt is a Med fall risk. Explained fall risk precautions to pt & self and rationale behind their use to keep the patient safe. Belongings are in reach. Pt encouraged to notify staff for any and all assistance. Staff present in tx suite throughout entirety of pts treatment to monitor and protect from falls. Assistance provided when ambulating to restroom utilizing Stay With Me.

## 2018-03-24 ENCOUNTER — HOSPITAL ENCOUNTER (OUTPATIENT)
Dept: ONCOLOGY | Age: 53
Discharge: HOME | End: 2018-03-25
Attending: INTERNAL MEDICINE | Admitting: INTERNAL MEDICINE

## 2018-03-24 VITALS
DIASTOLIC BLOOD PRESSURE: 82 MMHG | SYSTOLIC BLOOD PRESSURE: 125 MMHG | TEMPERATURE: 98.5 F | RESPIRATION RATE: 16 BRPM | HEART RATE: 90 BPM

## 2018-03-24 DIAGNOSIS — M79.89 PAIN AND SWELLING OF RIGHT LOWER LEG: ICD-10-CM

## 2018-03-24 DIAGNOSIS — M79.661 PAIN AND SWELLING OF RIGHT LOWER LEG: ICD-10-CM

## 2018-03-24 RX ORDER — HEPARIN SODIUM (PORCINE) LOCK FLUSH IV SOLN 100 UNIT/ML 100 UNIT/ML
300 SOLUTION INTRAVENOUS PRN
Status: DISCONTINUED | OUTPATIENT
Start: 2018-03-24 | End: 2018-03-26 | Stop reason: HOSPADM

## 2018-03-24 RX ORDER — HEPARIN SODIUM (PORCINE) LOCK FLUSH IV SOLN 100 UNIT/ML 100 UNIT/ML
300 SOLUTION INTRAVENOUS PRN
Status: CANCELLED
Start: 2018-03-24

## 2018-03-24 RX ADMIN — HEPARIN SODIUM (PORCINE) LOCK FLUSH IV SOLN 100 UNIT/ML 300 UNITS: 100 SOLUTION at 09:10

## 2018-03-25 ENCOUNTER — HOSPITAL ENCOUNTER (OUTPATIENT)
Dept: ONCOLOGY | Age: 53
Discharge: HOME | End: 2018-03-26
Attending: INTERNAL MEDICINE | Admitting: INTERNAL MEDICINE

## 2018-03-25 VITALS
RESPIRATION RATE: 16 BRPM | DIASTOLIC BLOOD PRESSURE: 72 MMHG | TEMPERATURE: 97.7 F | HEART RATE: 74 BPM | SYSTOLIC BLOOD PRESSURE: 118 MMHG

## 2018-03-25 DIAGNOSIS — M79.89 PAIN AND SWELLING OF RIGHT LOWER LEG: ICD-10-CM

## 2018-03-25 DIAGNOSIS — M79.661 PAIN AND SWELLING OF RIGHT LOWER LEG: ICD-10-CM

## 2018-03-25 RX ORDER — HEPARIN SODIUM (PORCINE) LOCK FLUSH IV SOLN 100 UNIT/ML 100 UNIT/ML
300 SOLUTION INTRAVENOUS PRN
Status: DISCONTINUED | OUTPATIENT
Start: 2018-03-25 | End: 2018-03-27 | Stop reason: HOSPADM

## 2018-03-25 RX ORDER — HEPARIN SODIUM (PORCINE) LOCK FLUSH IV SOLN 100 UNIT/ML 100 UNIT/ML
300 SOLUTION INTRAVENOUS PRN
Status: CANCELLED
Start: 2018-03-25

## 2018-03-25 RX ADMIN — HEPARIN SODIUM (PORCINE) LOCK FLUSH IV SOLN 100 UNIT/ML 300 UNITS: 100 SOLUTION at 09:28

## 2018-03-25 ASSESSMENT — PAIN SCALES - GENERAL: PAINLEVEL_OUTOF10: 3

## 2018-03-25 ASSESSMENT — PAIN DESCRIPTION - PROGRESSION: CLINICAL_PROGRESSION: GRADUALLY IMPROVING

## 2018-03-25 ASSESSMENT — PAIN DESCRIPTION - LOCATION: LOCATION: LEG

## 2018-03-25 ASSESSMENT — PAIN DESCRIPTION - FREQUENCY: FREQUENCY: CONTINUOUS

## 2018-03-25 ASSESSMENT — PAIN DESCRIPTION - PAIN TYPE: TYPE: CHRONIC PAIN

## 2018-03-25 ASSESSMENT — PAIN DESCRIPTION - ORIENTATION: ORIENTATION: RIGHT;LOWER

## 2018-03-25 ASSESSMENT — PAIN DESCRIPTION - ONSET: ONSET: ON-GOING

## 2018-03-25 ASSESSMENT — PAIN DESCRIPTION - DESCRIPTORS: DESCRIPTORS: BURNING

## 2018-03-25 NOTE — PROGRESS NOTES
Patient arrived to Outpatient Infusion for daily IV Cefepime infusion. Patient ambulating independently, steady gait noted. Patient alert and oriented, vitals stable. RUE PICC clean, dry and intact. Line flushed with brisk blood return noted.

## 2018-03-26 ENCOUNTER — HOSPITAL ENCOUNTER (OUTPATIENT)
Dept: ONCOLOGY | Age: 53
Discharge: HOME | End: 2018-03-27
Attending: INTERNAL MEDICINE | Admitting: INTERNAL MEDICINE

## 2018-03-26 VITALS
HEART RATE: 82 BPM | RESPIRATION RATE: 16 BRPM | SYSTOLIC BLOOD PRESSURE: 112 MMHG | TEMPERATURE: 98.4 F | DIASTOLIC BLOOD PRESSURE: 68 MMHG

## 2018-03-26 DIAGNOSIS — M79.89 PAIN AND SWELLING OF RIGHT LOWER LEG: ICD-10-CM

## 2018-03-26 DIAGNOSIS — M79.661 PAIN AND SWELLING OF RIGHT LOWER LEG: ICD-10-CM

## 2018-03-26 LAB
ANION GAP SERPL CALCULATED.3IONS-SCNC: 12 MMOL/L (ref 3–16)
BASOPHILS ABSOLUTE: 0 K/UL (ref 0–0.2)
BASOPHILS RELATIVE PERCENT: 0.2 %
BUN BLDV-MCNC: 21 MG/DL (ref 7–20)
C-REACTIVE PROTEIN: 36 MG/L (ref 0–5.1)
CALCIUM SERPL-MCNC: 8.6 MG/DL (ref 8.3–10.6)
CHLORIDE BLD-SCNC: 105 MMOL/L (ref 99–110)
CO2: 21 MMOL/L (ref 21–32)
CREAT SERPL-MCNC: 0.8 MG/DL (ref 0.9–1.3)
EOSINOPHILS ABSOLUTE: 0.3 K/UL (ref 0–0.6)
EOSINOPHILS RELATIVE PERCENT: 6.6 %
GFR AFRICAN AMERICAN: >60
GFR NON-AFRICAN AMERICAN: >60
GLUCOSE BLD-MCNC: 117 MG/DL (ref 70–99)
HCT VFR BLD CALC: 29.5 % (ref 40.5–52.5)
HEMOGLOBIN: 9.6 G/DL (ref 13.5–17.5)
LYMPHOCYTES ABSOLUTE: 1 K/UL (ref 1–5.1)
LYMPHOCYTES RELATIVE PERCENT: 21.6 %
MCH RBC QN AUTO: 25.1 PG (ref 26–34)
MCHC RBC AUTO-ENTMCNC: 32.7 G/DL (ref 31–36)
MCV RBC AUTO: 76.8 FL (ref 80–100)
MONOCYTES ABSOLUTE: 0.6 K/UL (ref 0–1.3)
MONOCYTES RELATIVE PERCENT: 12.7 %
NEUTROPHILS ABSOLUTE: 2.9 K/UL (ref 1.7–7.7)
NEUTROPHILS RELATIVE PERCENT: 58.9 %
PDW BLD-RTO: 18.2 % (ref 12.4–15.4)
PLATELET # BLD: 224 K/UL (ref 135–450)
PMV BLD AUTO: 6.4 FL (ref 5–10.5)
POTASSIUM SERPL-SCNC: 3.7 MMOL/L (ref 3.5–5.1)
RBC # BLD: 3.84 M/UL (ref 4.2–5.9)
SEDIMENTATION RATE, ERYTHROCYTE: 63 MM/HR (ref 0–20)
SODIUM BLD-SCNC: 138 MMOL/L (ref 136–145)
WBC # BLD: 4.8 K/UL (ref 4–11)

## 2018-03-26 RX ORDER — HEPARIN SODIUM (PORCINE) LOCK FLUSH IV SOLN 100 UNIT/ML 100 UNIT/ML
300 SOLUTION INTRAVENOUS PRN
Status: CANCELLED
Start: 2018-03-26

## 2018-03-26 RX ORDER — HEPARIN SODIUM (PORCINE) LOCK FLUSH IV SOLN 100 UNIT/ML 100 UNIT/ML
300 SOLUTION INTRAVENOUS PRN
Status: DISCONTINUED | OUTPATIENT
Start: 2018-03-26 | End: 2018-03-28 | Stop reason: HOSPADM

## 2018-03-26 RX ADMIN — HEPARIN SODIUM (PORCINE) LOCK FLUSH IV SOLN 100 UNIT/ML 300 UNITS: 100 SOLUTION at 09:30

## 2018-03-27 ENCOUNTER — HOSPITAL ENCOUNTER (OUTPATIENT)
Dept: ONCOLOGY | Age: 53
Discharge: HOME | End: 2018-03-28
Attending: INTERNAL MEDICINE | Admitting: INTERNAL MEDICINE

## 2018-03-27 VITALS
TEMPERATURE: 98.9 F | RESPIRATION RATE: 18 BRPM | HEART RATE: 84 BPM | DIASTOLIC BLOOD PRESSURE: 69 MMHG | SYSTOLIC BLOOD PRESSURE: 117 MMHG

## 2018-03-27 DIAGNOSIS — M79.661 PAIN AND SWELLING OF RIGHT LOWER LEG: ICD-10-CM

## 2018-03-27 DIAGNOSIS — M79.89 PAIN AND SWELLING OF RIGHT LOWER LEG: ICD-10-CM

## 2018-03-27 RX ORDER — HEPARIN SODIUM (PORCINE) LOCK FLUSH IV SOLN 100 UNIT/ML 100 UNIT/ML
300 SOLUTION INTRAVENOUS PRN
Status: DISCONTINUED | OUTPATIENT
Start: 2018-03-27 | End: 2018-03-29 | Stop reason: HOSPADM

## 2018-03-27 RX ORDER — HEPARIN SODIUM (PORCINE) LOCK FLUSH IV SOLN 100 UNIT/ML 100 UNIT/ML
300 SOLUTION INTRAVENOUS PRN
Status: CANCELLED
Start: 2018-03-27

## 2018-03-27 RX ADMIN — HEPARIN SODIUM (PORCINE) LOCK FLUSH IV SOLN 100 UNIT/ML 300 UNITS: 100 SOLUTION at 09:04

## 2018-03-27 ASSESSMENT — PAIN SCALES - GENERAL: PAINLEVEL_OUTOF10: 3

## 2018-03-27 NOTE — PLAN OF CARE
Problem: KNOWLEDGE DEFICIT  Goal: Patient/S.O. demonstrates understanding of disease process, treatment plan, medications, and discharge instructions. Outcome: Ongoing  Pt seen and assessed [address] today for Cefepime injection per orders from Dr Chary Gordon. Pt tolerated injection well and without incident. Pt verbalizes understanding of discharge instructions. Discharged ambulatory to home with self. Problem: Falls - Risk of:  Goal: Will remain free from falls  Will remain free from falls   Outcome: Ongoing  Pt is a Med fall risk. Explained fall risk precautions to pt & self and rationale behind their use to keep the patient safe. Belongings are in reach. Pt encouraged to notify staff for any and all assistance. Staff present in tx suite throughout entirety of pts treatment to monitor and protect from falls. Assistance provided when ambulating to restroom utilizing Stay With Me.

## 2018-03-28 ENCOUNTER — HOSPITAL ENCOUNTER (OUTPATIENT)
Dept: WOUND CARE | Age: 53
Discharge: HOME | End: 2018-03-28
Attending: SPECIALIST | Admitting: SPECIALIST

## 2018-03-28 ENCOUNTER — TELEPHONE (OUTPATIENT)
Dept: WOUND CARE | Age: 53
End: 2018-03-28

## 2018-03-28 ENCOUNTER — TELEPHONE (OUTPATIENT)
Dept: INFECTIOUS DISEASES | Age: 53
End: 2018-03-28

## 2018-03-28 ENCOUNTER — HOSPITAL ENCOUNTER (OUTPATIENT)
Dept: ONCOLOGY | Age: 53
Discharge: HOME | End: 2018-03-29
Attending: INTERNAL MEDICINE | Admitting: INTERNAL MEDICINE

## 2018-03-28 VITALS
TEMPERATURE: 97.9 F | RESPIRATION RATE: 18 BRPM | DIASTOLIC BLOOD PRESSURE: 75 MMHG | SYSTOLIC BLOOD PRESSURE: 133 MMHG | HEART RATE: 90 BPM

## 2018-03-28 VITALS
HEART RATE: 83 BPM | RESPIRATION RATE: 18 BRPM | DIASTOLIC BLOOD PRESSURE: 71 MMHG | SYSTOLIC BLOOD PRESSURE: 119 MMHG | TEMPERATURE: 99 F

## 2018-03-28 DIAGNOSIS — M79.89 PAIN AND SWELLING OF RIGHT LOWER LEG: ICD-10-CM

## 2018-03-28 DIAGNOSIS — I89.0 CHRONIC ACQUIRED LYMPHEDEMA: Primary | ICD-10-CM

## 2018-03-28 DIAGNOSIS — L97.919 IDIOPATHIC CHRONIC VENOUS HYPERTENSION OF RIGHT LOWER EXTREMITY WITH ULCER (HCC): ICD-10-CM

## 2018-03-28 DIAGNOSIS — I87.311 IDIOPATHIC CHRONIC VENOUS HYPERTENSION OF RIGHT LOWER EXTREMITY WITH ULCER (HCC): ICD-10-CM

## 2018-03-28 DIAGNOSIS — I82.220 THROMBOSIS OF INFERIOR VENA CAVA (HCC): ICD-10-CM

## 2018-03-28 DIAGNOSIS — Z79.01 CHRONIC ANTICOAGULATION: ICD-10-CM

## 2018-03-28 DIAGNOSIS — M79.661 PAIN AND SWELLING OF RIGHT LOWER LEG: ICD-10-CM

## 2018-03-28 PROCEDURE — 11045 DBRDMT SUBQ TISS EACH ADDL: CPT | Performed by: SPECIALIST

## 2018-03-28 PROCEDURE — 11042 DBRDMT SUBQ TIS 1ST 20SQCM/<: CPT | Performed by: SPECIALIST

## 2018-03-28 PROCEDURE — 29581 APPL MULTLAYER CMPRN SYS LEG: CPT | Performed by: SPECIALIST

## 2018-03-28 RX ORDER — LIDOCAINE HYDROCHLORIDE 40 MG/ML
SOLUTION TOPICAL ONCE
Status: COMPLETED | OUTPATIENT
Start: 2018-03-28 | End: 2018-03-28

## 2018-03-28 RX ORDER — HEPARIN SODIUM (PORCINE) LOCK FLUSH IV SOLN 100 UNIT/ML 100 UNIT/ML
300 SOLUTION INTRAVENOUS PRN
Status: CANCELLED
Start: 2018-03-28

## 2018-03-28 RX ORDER — HEPARIN SODIUM (PORCINE) LOCK FLUSH IV SOLN 100 UNIT/ML 100 UNIT/ML
300 SOLUTION INTRAVENOUS PRN
Status: DISCONTINUED | OUTPATIENT
Start: 2018-03-28 | End: 2018-03-30 | Stop reason: HOSPADM

## 2018-03-28 RX ADMIN — LIDOCAINE HYDROCHLORIDE 2.5 ML: 40 SOLUTION TOPICAL at 09:53

## 2018-03-28 ASSESSMENT — PAIN DESCRIPTION - LOCATION: LOCATION: LEG

## 2018-03-28 ASSESSMENT — PAIN DESCRIPTION - DESCRIPTORS: DESCRIPTORS: BURNING

## 2018-03-28 ASSESSMENT — PAIN DESCRIPTION - FREQUENCY: FREQUENCY: CONTINUOUS

## 2018-03-28 ASSESSMENT — PAIN DESCRIPTION - ORIENTATION: ORIENTATION: RIGHT

## 2018-03-28 ASSESSMENT — PAIN DESCRIPTION - PAIN TYPE: TYPE: CHRONIC PAIN

## 2018-03-28 ASSESSMENT — PAIN SCALES - GENERAL: PAINLEVEL_OUTOF10: 3

## 2018-03-28 NOTE — PROGRESS NOTES
Venous     Bleeding: Minimal    Hemostasis Achieved: by pressure    Procedural Pain: 0  / 10     Post Procedural Pain: 0 / 10     Response to treatment:  Well tolerated by patient. Wounds  being on IV antibiotics. He agrees to allow Dr. Chitra Vallecillo to attempt STSG next month and be off of work. Plan:     Treatment Note: Please see attached Discharge Instructions. These instructions were given and singed by the patient or POA    New Medication(s) at this visit:   New Prescriptions    No medications on file       In my professional opinion this patient would benefit from HBO Therapy: No       Patient is to return to wound care center in:  1 week(s)    Discharge [facility] Physician Orders and Discharge Instructions  The [address], [address]  Telephone: [phone]    NAME:  Deann Rand  YOB: 1965  MEDICAL RECORD NUMBER:  [id number]  DATE:  3/28/2018    Wash hands with soap and water prior to and after every dressing change. Wound Cleansing:   · Do not scrub or use excessive force. · With each dressing change, rinse wounds with 0.9% Saline. (May use wound wash or soft contact solution. Both can be purchased at a local drug store). · If unable to obtain saline, may use a gentle soap and water. · Keep wounds dry in the shower unless otherwise instructed by the physician. Topical Treatments:  Do not apply lotions, creams, or ointments to wound bed unless directed. [] Apply moisturizing lotion to skin surrounding the wound prior to dressing change.  [] Apply antifungal ointment to skin surrounding the wound prior to dressing change. [x] Apply thin film of moisture barrier ointment to skin immediately around wound.   [x] Other: Use vashe wash- soaked gauze for 5 minutes before applying dressing       Dressings:           Wound Location: right lower leg wounds     [x] Apply 10:01 AM     Wound Care Center Information: Should you experience any significant changes in your wound(s) or have questions about your wound care, please contact the [address] [place] [address] at [phone] Monday-Friday from 8:00 am - 4:30 pm except for Wednesdays which hours are from 8:00 am - 2:00 pm.   If you need help with your wound outside these hours and cannot wait until we are again available, contact your PCP or go to the hospital emergency room. PLEASE NOTE: IF YOU ARE UNABLE TO OBTAIN WOUND SUPPLIES, CONTINUE TO USE THE SUPPLIES YOU HAVE AVAILABLE UNTIL YOU ARE ABLE TO REACH US. IT IS MOST IMPORTANT TO KEEP THE WOUND COVERED AT ALL TIMES. Physician orders by:     [] Dr Hernan Yeager [] Dr Brina Vee    [] Dr Tahir Espinoza     [] Dr Bridgett Stein   [] Dr Adam Gann  [] Dr Jerad Carmona  [] Dr Erika Smith       Physician Signature:__________________________________        The information contained in the After Visit Summary has been reviewed with me, the patient and/or responsible adult, by my health care provider(s). I had the opportunity to ask questions regarding this information.   I have elected to receive;      [] Patient unable to sign Discharge Instructions given to ECF/Transportation/POA        Electronically signed by Jeff Quiñones MD on 3/28/2018 at 12:04 PM

## 2018-03-28 NOTE — PLAN OF CARE
Problem: Falls - Risk of:  Goal: Will remain free from falls  Will remain free from falls   Outcome: Ongoing  Pt is a Med fall risk. Explained fall risk precautions to pt & self and rationale behind their use to keep the patient safe. Belongings are in reach. Pt encouraged to notify staff for any and all assistance. Staff present in tx suite throughout entirety of pts treatment to monitor and protect from falls. Assistance provided when ambulating to restroom utilizing Stay With Me. Problem: KNOWLEDGE DEFICIT  Goal: Patient/S.O. demonstrates understanding of disease process, treatment plan, medications, and discharge instructions. Outcome: Met This Shift  Pt seen and assessed [facility] today for Cefepime injection per orders from Dr Radha Beltran. Pt tolerated infusion well and without incident. Pt verbalizes understanding of discharge instructions. Discharged ambualatory to wound care with self.

## 2018-03-29 ENCOUNTER — HOSPITAL ENCOUNTER (OUTPATIENT)
Dept: ONCOLOGY | Age: 53
Discharge: HOME | End: 2018-03-30
Attending: INTERNAL MEDICINE | Admitting: INTERNAL MEDICINE

## 2018-03-29 VITALS
RESPIRATION RATE: 16 BRPM | SYSTOLIC BLOOD PRESSURE: 117 MMHG | TEMPERATURE: 98.7 F | HEART RATE: 87 BPM | DIASTOLIC BLOOD PRESSURE: 72 MMHG

## 2018-03-29 RX ORDER — HEPARIN SODIUM (PORCINE) LOCK FLUSH IV SOLN 100 UNIT/ML 100 UNIT/ML
300 SOLUTION INTRAVENOUS PRN
Status: DISCONTINUED | OUTPATIENT
Start: 2018-03-29 | End: 2018-03-31 | Stop reason: HOSPADM

## 2018-03-29 RX ADMIN — HEPARIN SODIUM (PORCINE) LOCK FLUSH IV SOLN 100 UNIT/ML 300 UNITS: 100 SOLUTION at 09:26

## 2018-03-30 ENCOUNTER — HOSPITAL ENCOUNTER (OUTPATIENT)
Dept: ONCOLOGY | Age: 53
Discharge: HOME | End: 2018-03-31
Attending: INTERNAL MEDICINE | Admitting: INTERNAL MEDICINE

## 2018-03-30 VITALS
HEART RATE: 82 BPM | TEMPERATURE: 98.4 F | RESPIRATION RATE: 16 BRPM | SYSTOLIC BLOOD PRESSURE: 112 MMHG | DIASTOLIC BLOOD PRESSURE: 71 MMHG

## 2018-03-30 DIAGNOSIS — M79.661 PAIN AND SWELLING OF RIGHT LOWER LEG: ICD-10-CM

## 2018-03-30 DIAGNOSIS — M79.89 PAIN AND SWELLING OF RIGHT LOWER LEG: ICD-10-CM

## 2018-03-30 RX ORDER — HEPARIN SODIUM (PORCINE) LOCK FLUSH IV SOLN 100 UNIT/ML 100 UNIT/ML
300 SOLUTION INTRAVENOUS PRN
Status: DISCONTINUED | OUTPATIENT
Start: 2018-03-30 | End: 2018-04-01 | Stop reason: HOSPADM

## 2018-03-30 RX ORDER — HEPARIN SODIUM (PORCINE) LOCK FLUSH IV SOLN 100 UNIT/ML 100 UNIT/ML
300 SOLUTION INTRAVENOUS PRN
Status: CANCELLED
Start: 2018-03-30

## 2018-03-30 RX ADMIN — HEPARIN SODIUM (PORCINE) LOCK FLUSH IV SOLN 100 UNIT/ML 300 UNITS: 100 SOLUTION at 09:29

## 2018-03-31 ENCOUNTER — HOSPITAL ENCOUNTER (OUTPATIENT)
Dept: ONCOLOGY | Age: 53
Discharge: HOME | End: 2018-04-01
Attending: INTERNAL MEDICINE | Admitting: INTERNAL MEDICINE

## 2018-03-31 VITALS
DIASTOLIC BLOOD PRESSURE: 72 MMHG | RESPIRATION RATE: 16 BRPM | HEART RATE: 86 BPM | SYSTOLIC BLOOD PRESSURE: 120 MMHG | TEMPERATURE: 98.7 F

## 2018-03-31 DIAGNOSIS — M79.89 PAIN AND SWELLING OF RIGHT LOWER LEG: ICD-10-CM

## 2018-03-31 DIAGNOSIS — M79.661 PAIN AND SWELLING OF RIGHT LOWER LEG: ICD-10-CM

## 2018-03-31 RX ORDER — HEPARIN SODIUM (PORCINE) LOCK FLUSH IV SOLN 100 UNIT/ML 100 UNIT/ML
300 SOLUTION INTRAVENOUS PRN
Status: CANCELLED
Start: 2018-03-31

## 2018-03-31 RX ORDER — HEPARIN SODIUM (PORCINE) LOCK FLUSH IV SOLN 100 UNIT/ML 100 UNIT/ML
300 SOLUTION INTRAVENOUS PRN
Status: DISCONTINUED | OUTPATIENT
Start: 2018-03-31 | End: 2018-04-02 | Stop reason: HOSPADM

## 2018-03-31 RX ADMIN — HEPARIN SODIUM (PORCINE) LOCK FLUSH IV SOLN 100 UNIT/ML 300 UNITS: 100 SOLUTION at 09:32

## 2018-04-01 ENCOUNTER — HOSPITAL ENCOUNTER (OUTPATIENT)
Dept: ONCOLOGY | Age: 53
Discharge: HOME | End: 2018-04-02
Attending: INTERNAL MEDICINE | Admitting: INTERNAL MEDICINE

## 2018-04-01 ENCOUNTER — HOSPITAL ENCOUNTER (OUTPATIENT)
Dept: ONCOLOGY | Age: 53
Discharge: HOME | End: 2018-04-30
Attending: INTERNAL MEDICINE | Admitting: INTERNAL MEDICINE

## 2018-04-01 VITALS
TEMPERATURE: 98.4 F | DIASTOLIC BLOOD PRESSURE: 67 MMHG | RESPIRATION RATE: 16 BRPM | HEART RATE: 82 BPM | SYSTOLIC BLOOD PRESSURE: 122 MMHG

## 2018-04-01 RX ORDER — HEPARIN SODIUM (PORCINE) LOCK FLUSH IV SOLN 100 UNIT/ML 100 UNIT/ML
300 SOLUTION INTRAVENOUS PRN
Status: DISCONTINUED | OUTPATIENT
Start: 2018-04-01 | End: 2018-04-03 | Stop reason: HOSPADM

## 2018-04-01 RX ADMIN — HEPARIN SODIUM (PORCINE) LOCK FLUSH IV SOLN 100 UNIT/ML 300 UNITS: 100 SOLUTION at 09:14

## 2018-04-02 ENCOUNTER — HOSPITAL ENCOUNTER (OUTPATIENT)
Dept: ONCOLOGY | Age: 53
Discharge: HOME | End: 2018-04-03
Attending: INTERNAL MEDICINE | Admitting: INTERNAL MEDICINE

## 2018-04-02 VITALS
HEART RATE: 87 BPM | TEMPERATURE: 99 F | SYSTOLIC BLOOD PRESSURE: 112 MMHG | DIASTOLIC BLOOD PRESSURE: 70 MMHG | RESPIRATION RATE: 16 BRPM

## 2018-04-02 LAB
ANION GAP SERPL CALCULATED.3IONS-SCNC: 12 MMOL/L (ref 3–16)
BASOPHILS ABSOLUTE: 0 K/UL (ref 0–0.2)
BASOPHILS RELATIVE PERCENT: 0.2 %
BUN BLDV-MCNC: 22 MG/DL (ref 7–20)
C-REACTIVE PROTEIN: 82 MG/L (ref 0–5.1)
CALCIUM SERPL-MCNC: 8.2 MG/DL (ref 8.3–10.6)
CHLORIDE BLD-SCNC: 110 MMOL/L (ref 99–110)
CO2: 19 MMOL/L (ref 21–32)
CREAT SERPL-MCNC: 0.9 MG/DL (ref 0.9–1.3)
EOSINOPHILS ABSOLUTE: 0.2 K/UL (ref 0–0.6)
EOSINOPHILS RELATIVE PERCENT: 2.5 %
GFR AFRICAN AMERICAN: >60
GFR NON-AFRICAN AMERICAN: >60
GLUCOSE BLD-MCNC: 111 MG/DL (ref 70–99)
HCT VFR BLD CALC: 28.9 % (ref 40.5–52.5)
HEMOGLOBIN: 9.4 G/DL (ref 13.5–17.5)
LYMPHOCYTES ABSOLUTE: 1.1 K/UL (ref 1–5.1)
LYMPHOCYTES RELATIVE PERCENT: 14.5 %
MCH RBC QN AUTO: 25.2 PG (ref 26–34)
MCHC RBC AUTO-ENTMCNC: 32.7 G/DL (ref 31–36)
MCV RBC AUTO: 77 FL (ref 80–100)
MONOCYTES ABSOLUTE: 0.9 K/UL (ref 0–1.3)
MONOCYTES RELATIVE PERCENT: 12 %
NEUTROPHILS ABSOLUTE: 5.3 K/UL (ref 1.7–7.7)
NEUTROPHILS RELATIVE PERCENT: 70.8 %
PDW BLD-RTO: 18.1 % (ref 12.4–15.4)
PLATELET # BLD: 214 K/UL (ref 135–450)
PMV BLD AUTO: 6.3 FL (ref 5–10.5)
POTASSIUM SERPL-SCNC: 3.8 MMOL/L (ref 3.5–5.1)
RBC # BLD: 3.75 M/UL (ref 4.2–5.9)
SEDIMENTATION RATE, ERYTHROCYTE: 74 MM/HR (ref 0–20)
SODIUM BLD-SCNC: 141 MMOL/L (ref 136–145)
WBC # BLD: 7.4 K/UL (ref 4–11)

## 2018-04-02 RX ORDER — HEPARIN SODIUM (PORCINE) LOCK FLUSH IV SOLN 100 UNIT/ML 100 UNIT/ML
300 SOLUTION INTRAVENOUS PRN
Status: DISCONTINUED | OUTPATIENT
Start: 2018-04-02 | End: 2018-04-04 | Stop reason: HOSPADM

## 2018-04-02 RX ADMIN — HEPARIN SODIUM (PORCINE) LOCK FLUSH IV SOLN 100 UNIT/ML 300 UNITS: 100 SOLUTION at 09:21

## 2018-04-03 ENCOUNTER — HOSPITAL ENCOUNTER (OUTPATIENT)
Dept: ONCOLOGY | Age: 53
Discharge: HOME | End: 2018-04-04
Attending: INTERNAL MEDICINE | Admitting: INTERNAL MEDICINE

## 2018-04-03 VITALS
SYSTOLIC BLOOD PRESSURE: 115 MMHG | HEART RATE: 89 BPM | DIASTOLIC BLOOD PRESSURE: 70 MMHG | TEMPERATURE: 98.9 F | RESPIRATION RATE: 18 BRPM

## 2018-04-03 DIAGNOSIS — M79.89 PAIN AND SWELLING OF RIGHT LOWER LEG: ICD-10-CM

## 2018-04-03 DIAGNOSIS — M79.661 PAIN AND SWELLING OF RIGHT LOWER LEG: ICD-10-CM

## 2018-04-03 RX ORDER — HEPARIN SODIUM (PORCINE) LOCK FLUSH IV SOLN 100 UNIT/ML 100 UNIT/ML
300 SOLUTION INTRAVENOUS PRN
Status: CANCELLED
Start: 2018-04-03

## 2018-04-03 RX ORDER — HEPARIN SODIUM (PORCINE) LOCK FLUSH IV SOLN 100 UNIT/ML 100 UNIT/ML
300 SOLUTION INTRAVENOUS PRN
Status: CANCELLED
Start: 2018-04-04

## 2018-04-03 RX ORDER — HEPARIN SODIUM (PORCINE) LOCK FLUSH IV SOLN 100 UNIT/ML 100 UNIT/ML
300 SOLUTION INTRAVENOUS PRN
Status: DISCONTINUED | OUTPATIENT
Start: 2018-04-03 | End: 2018-04-04 | Stop reason: SDUPTHER

## 2018-04-03 RX ADMIN — HEPARIN SODIUM (PORCINE) LOCK FLUSH IV SOLN 100 UNIT/ML 300 UNITS: 100 SOLUTION at 10:07

## 2018-04-04 ENCOUNTER — HOSPITAL ENCOUNTER (OUTPATIENT)
Dept: ONCOLOGY | Age: 53
Discharge: HOME | End: 2018-04-05
Attending: INTERNAL MEDICINE | Admitting: INTERNAL MEDICINE

## 2018-04-04 VITALS
RESPIRATION RATE: 18 BRPM | SYSTOLIC BLOOD PRESSURE: 122 MMHG | DIASTOLIC BLOOD PRESSURE: 74 MMHG | HEART RATE: 72 BPM | TEMPERATURE: 97.9 F

## 2018-04-04 DIAGNOSIS — M79.661 PAIN AND SWELLING OF RIGHT LOWER LEG: ICD-10-CM

## 2018-04-04 DIAGNOSIS — M79.89 PAIN AND SWELLING OF RIGHT LOWER LEG: ICD-10-CM

## 2018-04-04 RX ORDER — HEPARIN SODIUM (PORCINE) LOCK FLUSH IV SOLN 100 UNIT/ML 100 UNIT/ML
300 SOLUTION INTRAVENOUS PRN
Status: DISCONTINUED | OUTPATIENT
Start: 2018-04-04 | End: 2018-04-06 | Stop reason: HOSPADM

## 2018-04-04 RX ORDER — HEPARIN SODIUM (PORCINE) LOCK FLUSH IV SOLN 100 UNIT/ML 100 UNIT/ML
300 SOLUTION INTRAVENOUS PRN
Status: CANCELLED
Start: 2018-04-04

## 2018-04-04 RX ADMIN — HEPARIN SODIUM (PORCINE) LOCK FLUSH IV SOLN 100 UNIT/ML 300 UNITS: 100 SOLUTION at 09:07

## 2018-04-05 ENCOUNTER — HOSPITAL ENCOUNTER (OUTPATIENT)
Dept: ONCOLOGY | Age: 53
Discharge: HOME | End: 2018-04-06
Attending: INTERNAL MEDICINE | Admitting: INTERNAL MEDICINE

## 2018-04-05 VITALS
RESPIRATION RATE: 18 BRPM | DIASTOLIC BLOOD PRESSURE: 69 MMHG | TEMPERATURE: 98.8 F | HEART RATE: 94 BPM | SYSTOLIC BLOOD PRESSURE: 116 MMHG

## 2018-04-05 DIAGNOSIS — M79.89 PAIN AND SWELLING OF RIGHT LOWER LEG: ICD-10-CM

## 2018-04-05 DIAGNOSIS — M79.661 PAIN AND SWELLING OF RIGHT LOWER LEG: ICD-10-CM

## 2018-04-05 RX ORDER — HEPARIN SODIUM (PORCINE) LOCK FLUSH IV SOLN 100 UNIT/ML 100 UNIT/ML
300 SOLUTION INTRAVENOUS PRN
Status: CANCELLED
Start: 2018-04-05

## 2018-04-05 RX ORDER — HEPARIN SODIUM (PORCINE) LOCK FLUSH IV SOLN 100 UNIT/ML 100 UNIT/ML
300 SOLUTION INTRAVENOUS PRN
Status: DISCONTINUED | OUTPATIENT
Start: 2018-04-05 | End: 2018-04-07 | Stop reason: HOSPADM

## 2018-04-05 RX ADMIN — HEPARIN SODIUM (PORCINE) LOCK FLUSH IV SOLN 100 UNIT/ML 300 UNITS: 100 SOLUTION at 09:17

## 2018-04-06 ENCOUNTER — HOSPITAL ENCOUNTER (OUTPATIENT)
Dept: WOUND CARE | Age: 53
Discharge: HOME | End: 2018-04-06
Attending: SPECIALIST | Admitting: SPECIALIST

## 2018-04-06 ENCOUNTER — HOSPITAL ENCOUNTER (OUTPATIENT)
Dept: ONCOLOGY | Age: 53
Discharge: HOME | End: 2018-04-07
Attending: INTERNAL MEDICINE | Admitting: INTERNAL MEDICINE

## 2018-04-06 VITALS
DIASTOLIC BLOOD PRESSURE: 71 MMHG | HEART RATE: 87 BPM | SYSTOLIC BLOOD PRESSURE: 129 MMHG | RESPIRATION RATE: 18 BRPM | TEMPERATURE: 97.9 F

## 2018-04-06 VITALS
TEMPERATURE: 97.5 F | HEART RATE: 94 BPM | RESPIRATION RATE: 18 BRPM | DIASTOLIC BLOOD PRESSURE: 73 MMHG | SYSTOLIC BLOOD PRESSURE: 142 MMHG

## 2018-04-06 DIAGNOSIS — M79.89 PAIN AND SWELLING OF RIGHT LOWER LEG: ICD-10-CM

## 2018-04-06 DIAGNOSIS — I89.0 CHRONIC ACQUIRED LYMPHEDEMA: Primary | ICD-10-CM

## 2018-04-06 DIAGNOSIS — I87.311 IDIOPATHIC CHRONIC VENOUS HYPERTENSION OF RIGHT LOWER EXTREMITY WITH ULCER (HCC): ICD-10-CM

## 2018-04-06 DIAGNOSIS — I82.220 THROMBOSIS OF INFERIOR VENA CAVA (HCC): ICD-10-CM

## 2018-04-06 DIAGNOSIS — M79.661 PAIN AND SWELLING OF RIGHT LOWER LEG: ICD-10-CM

## 2018-04-06 DIAGNOSIS — R60.0 LOCALIZED EDEMA: ICD-10-CM

## 2018-04-06 DIAGNOSIS — I82.423 THROMBOSIS OF BOTH ILIAC VEINS (HCC): ICD-10-CM

## 2018-04-06 DIAGNOSIS — L97.919 IDIOPATHIC CHRONIC VENOUS HYPERTENSION OF RIGHT LOWER EXTREMITY WITH ULCER (HCC): ICD-10-CM

## 2018-04-06 PROCEDURE — 11042 DBRDMT SUBQ TIS 1ST 20SQCM/<: CPT | Performed by: SPECIALIST

## 2018-04-06 PROCEDURE — 11045 DBRDMT SUBQ TISS EACH ADDL: CPT | Performed by: SPECIALIST

## 2018-04-06 PROCEDURE — 29581 APPL MULTLAYER CMPRN SYS LEG: CPT | Performed by: SPECIALIST

## 2018-04-06 RX ORDER — LIDOCAINE HYDROCHLORIDE 40 MG/ML
2.5 SOLUTION TOPICAL ONCE
Status: DISCONTINUED | OUTPATIENT
Start: 2018-04-06 | End: 2018-04-06 | Stop reason: DRUGHIGH

## 2018-04-06 RX ORDER — LIDOCAINE HYDROCHLORIDE 40 MG/ML
2.5 SOLUTION TOPICAL ONCE
Status: COMPLETED | OUTPATIENT
Start: 2018-04-06 | End: 2018-04-06

## 2018-04-06 RX ORDER — HEPARIN SODIUM (PORCINE) LOCK FLUSH IV SOLN 100 UNIT/ML 100 UNIT/ML
300 SOLUTION INTRAVENOUS PRN
Status: CANCELLED
Start: 2018-04-06

## 2018-04-06 RX ORDER — HEPARIN SODIUM (PORCINE) LOCK FLUSH IV SOLN 100 UNIT/ML 100 UNIT/ML
300 SOLUTION INTRAVENOUS PRN
Status: DISCONTINUED | OUTPATIENT
Start: 2018-04-06 | End: 2018-04-08 | Stop reason: HOSPADM

## 2018-04-06 RX ADMIN — HEPARIN SODIUM (PORCINE) LOCK FLUSH IV SOLN 100 UNIT/ML 300 UNITS: 100 SOLUTION at 09:44

## 2018-04-06 RX ADMIN — LIDOCAINE HYDROCHLORIDE 10 ML: 40 SOLUTION TOPICAL at 12:21

## 2018-04-06 ASSESSMENT — PAIN DESCRIPTION - PROGRESSION: CLINICAL_PROGRESSION: NOT CHANGED

## 2018-04-06 ASSESSMENT — PAIN DESCRIPTION - DESCRIPTORS: DESCRIPTORS: BURNING

## 2018-04-06 ASSESSMENT — PAIN DESCRIPTION - PAIN TYPE: TYPE: CHRONIC PAIN

## 2018-04-06 ASSESSMENT — PAIN DESCRIPTION - ORIENTATION: ORIENTATION: RIGHT

## 2018-04-06 ASSESSMENT — PAIN SCALES - GENERAL: PAINLEVEL_OUTOF10: 8

## 2018-04-06 ASSESSMENT — PAIN DESCRIPTION - FREQUENCY: FREQUENCY: CONTINUOUS

## 2018-04-06 ASSESSMENT — PAIN DESCRIPTION - ONSET: ONSET: ON-GOING

## 2018-04-06 ASSESSMENT — PAIN DESCRIPTION - LOCATION: LOCATION: LEG

## 2018-04-07 ENCOUNTER — HOSPITAL ENCOUNTER (OUTPATIENT)
Dept: ONCOLOGY | Age: 53
Discharge: HOME | End: 2018-04-08
Attending: INTERNAL MEDICINE | Admitting: INTERNAL MEDICINE

## 2018-04-07 VITALS
RESPIRATION RATE: 18 BRPM | TEMPERATURE: 98.2 F | HEART RATE: 90 BPM | DIASTOLIC BLOOD PRESSURE: 66 MMHG | SYSTOLIC BLOOD PRESSURE: 111 MMHG

## 2018-04-07 DIAGNOSIS — M79.89 PAIN AND SWELLING OF RIGHT LOWER LEG: ICD-10-CM

## 2018-04-07 DIAGNOSIS — M79.661 PAIN AND SWELLING OF RIGHT LOWER LEG: ICD-10-CM

## 2018-04-07 RX ORDER — HEPARIN SODIUM (PORCINE) LOCK FLUSH IV SOLN 100 UNIT/ML 100 UNIT/ML
300 SOLUTION INTRAVENOUS PRN
Status: CANCELLED
Start: 2018-04-07

## 2018-04-07 RX ORDER — HEPARIN SODIUM (PORCINE) LOCK FLUSH IV SOLN 100 UNIT/ML 100 UNIT/ML
300 SOLUTION INTRAVENOUS PRN
Status: DISCONTINUED | OUTPATIENT
Start: 2018-04-07 | End: 2018-04-09 | Stop reason: HOSPADM

## 2018-04-07 RX ADMIN — HEPARIN SODIUM (PORCINE) LOCK FLUSH IV SOLN 100 UNIT/ML 300 UNITS: 100 SOLUTION at 09:44

## 2018-04-08 ENCOUNTER — HOSPITAL ENCOUNTER (OUTPATIENT)
Dept: ONCOLOGY | Age: 53
Discharge: HOME | End: 2018-04-09
Attending: INTERNAL MEDICINE | Admitting: INTERNAL MEDICINE

## 2018-04-08 VITALS
TEMPERATURE: 97.9 F | HEART RATE: 84 BPM | SYSTOLIC BLOOD PRESSURE: 113 MMHG | RESPIRATION RATE: 16 BRPM | DIASTOLIC BLOOD PRESSURE: 68 MMHG

## 2018-04-08 DIAGNOSIS — M79.89 PAIN AND SWELLING OF RIGHT LOWER LEG: ICD-10-CM

## 2018-04-08 DIAGNOSIS — M79.661 PAIN AND SWELLING OF RIGHT LOWER LEG: ICD-10-CM

## 2018-04-08 RX ORDER — HEPARIN SODIUM (PORCINE) LOCK FLUSH IV SOLN 100 UNIT/ML 100 UNIT/ML
300 SOLUTION INTRAVENOUS PRN
Status: DISCONTINUED | OUTPATIENT
Start: 2018-04-08 | End: 2018-04-10 | Stop reason: HOSPADM

## 2018-04-08 RX ORDER — HEPARIN SODIUM (PORCINE) LOCK FLUSH IV SOLN 100 UNIT/ML 100 UNIT/ML
300 SOLUTION INTRAVENOUS PRN
Status: CANCELLED
Start: 2018-04-08

## 2018-04-08 RX ADMIN — HEPARIN SODIUM (PORCINE) LOCK FLUSH IV SOLN 100 UNIT/ML 300 UNITS: 100 SOLUTION at 09:09

## 2018-04-09 ENCOUNTER — TELEPHONE (OUTPATIENT)
Dept: INFECTIOUS DISEASES | Age: 53
End: 2018-04-09

## 2018-04-09 ENCOUNTER — HOSPITAL ENCOUNTER (OUTPATIENT)
Dept: ONCOLOGY | Age: 53
Discharge: HOME | End: 2018-04-10
Attending: INTERNAL MEDICINE | Admitting: INTERNAL MEDICINE

## 2018-04-09 ENCOUNTER — HOSPITAL ENCOUNTER (OUTPATIENT)
Dept: WOUND CARE | Age: 53
Discharge: HOME | End: 2018-04-09
Attending: SPECIALIST | Admitting: SPECIALIST

## 2018-04-09 VITALS
DIASTOLIC BLOOD PRESSURE: 70 MMHG | RESPIRATION RATE: 16 BRPM | TEMPERATURE: 97.7 F | HEART RATE: 79 BPM | SYSTOLIC BLOOD PRESSURE: 118 MMHG

## 2018-04-09 VITALS
DIASTOLIC BLOOD PRESSURE: 79 MMHG | RESPIRATION RATE: 18 BRPM | SYSTOLIC BLOOD PRESSURE: 138 MMHG | HEART RATE: 83 BPM | TEMPERATURE: 98 F

## 2018-04-09 DIAGNOSIS — M79.89 PAIN AND SWELLING OF RIGHT LOWER LEG: ICD-10-CM

## 2018-04-09 DIAGNOSIS — M79.661 PAIN AND SWELLING OF RIGHT LOWER LEG: ICD-10-CM

## 2018-04-09 LAB
ANION GAP SERPL CALCULATED.3IONS-SCNC: 16 MMOL/L (ref 3–16)
BASOPHILS ABSOLUTE: 0 K/UL (ref 0–0.2)
BASOPHILS RELATIVE PERCENT: 0.2 %
BUN BLDV-MCNC: 27 MG/DL (ref 7–20)
C-REACTIVE PROTEIN: 87.2 MG/L (ref 0–5.1)
CALCIUM SERPL-MCNC: 8.6 MG/DL (ref 8.3–10.6)
CHLORIDE BLD-SCNC: 106 MMOL/L (ref 99–110)
CO2: 17 MMOL/L (ref 21–32)
CREAT SERPL-MCNC: 1.2 MG/DL (ref 0.9–1.3)
EOSINOPHILS ABSOLUTE: 0.4 K/UL (ref 0–0.6)
EOSINOPHILS RELATIVE PERCENT: 6.5 %
GFR AFRICAN AMERICAN: >60
GFR NON-AFRICAN AMERICAN: >60
GLUCOSE BLD-MCNC: 116 MG/DL (ref 70–99)
HCT VFR BLD CALC: 27.5 % (ref 40.5–52.5)
HEMOGLOBIN: 8.8 G/DL (ref 13.5–17.5)
LYMPHOCYTES ABSOLUTE: 1 K/UL (ref 1–5.1)
LYMPHOCYTES RELATIVE PERCENT: 17.8 %
MCH RBC QN AUTO: 24.5 PG (ref 26–34)
MCHC RBC AUTO-ENTMCNC: 32.1 G/DL (ref 31–36)
MCV RBC AUTO: 76.4 FL (ref 80–100)
MONOCYTES ABSOLUTE: 0.7 K/UL (ref 0–1.3)
MONOCYTES RELATIVE PERCENT: 11.8 %
NEUTROPHILS ABSOLUTE: 3.6 K/UL (ref 1.7–7.7)
NEUTROPHILS RELATIVE PERCENT: 63.7 %
PDW BLD-RTO: 18.3 % (ref 12.4–15.4)
PLATELET # BLD: 314 K/UL (ref 135–450)
PMV BLD AUTO: 6.3 FL (ref 5–10.5)
POTASSIUM SERPL-SCNC: 3.3 MMOL/L (ref 3.5–5.1)
RBC # BLD: 3.6 M/UL (ref 4.2–5.9)
SEDIMENTATION RATE, ERYTHROCYTE: 88 MM/HR (ref 0–20)
SODIUM BLD-SCNC: 139 MMOL/L (ref 136–145)
WBC # BLD: 5.7 K/UL (ref 4–11)

## 2018-04-09 RX ORDER — HEPARIN SODIUM (PORCINE) LOCK FLUSH IV SOLN 100 UNIT/ML 100 UNIT/ML
300 SOLUTION INTRAVENOUS PRN
Status: CANCELLED
Start: 2018-04-09

## 2018-04-09 RX ORDER — HEPARIN SODIUM (PORCINE) LOCK FLUSH IV SOLN 100 UNIT/ML 100 UNIT/ML
300 SOLUTION INTRAVENOUS PRN
Status: DISCONTINUED | OUTPATIENT
Start: 2018-04-09 | End: 2018-04-11 | Stop reason: HOSPADM

## 2018-04-09 RX ADMIN — HEPARIN SODIUM (PORCINE) LOCK FLUSH IV SOLN 100 UNIT/ML 300 UNITS: 100 SOLUTION at 09:08

## 2018-04-09 ASSESSMENT — PAIN DESCRIPTION - PAIN TYPE: TYPE: ACUTE PAIN;CHRONIC PAIN

## 2018-04-09 ASSESSMENT — PAIN DESCRIPTION - ORIENTATION: ORIENTATION: RIGHT

## 2018-04-09 ASSESSMENT — PAIN DESCRIPTION - ONSET: ONSET: ON-GOING

## 2018-04-09 ASSESSMENT — PAIN DESCRIPTION - PROGRESSION: CLINICAL_PROGRESSION: NOT CHANGED

## 2018-04-09 ASSESSMENT — PAIN SCALES - GENERAL: PAINLEVEL_OUTOF10: 8

## 2018-04-09 ASSESSMENT — PAIN DESCRIPTION - DESCRIPTORS: DESCRIPTORS: BURNING

## 2018-04-09 ASSESSMENT — PAIN DESCRIPTION - LOCATION: LOCATION: LEG

## 2018-04-10 ENCOUNTER — HOSPITAL ENCOUNTER (OUTPATIENT)
Dept: ONCOLOGY | Age: 53
Discharge: HOME | End: 2018-04-11
Attending: INTERNAL MEDICINE | Admitting: INTERNAL MEDICINE

## 2018-04-10 ENCOUNTER — TELEPHONE (OUTPATIENT)
Dept: WOUND CARE | Age: 53
End: 2018-04-10

## 2018-04-10 VITALS
SYSTOLIC BLOOD PRESSURE: 106 MMHG | TEMPERATURE: 98 F | RESPIRATION RATE: 18 BRPM | DIASTOLIC BLOOD PRESSURE: 66 MMHG | HEART RATE: 88 BPM

## 2018-04-10 DIAGNOSIS — M79.89 PAIN AND SWELLING OF RIGHT LOWER LEG: ICD-10-CM

## 2018-04-10 DIAGNOSIS — M79.661 PAIN AND SWELLING OF RIGHT LOWER LEG: ICD-10-CM

## 2018-04-10 RX ORDER — HEPARIN SODIUM (PORCINE) LOCK FLUSH IV SOLN 100 UNIT/ML 100 UNIT/ML
300 SOLUTION INTRAVENOUS PRN
Status: CANCELLED
Start: 2018-04-10

## 2018-04-10 RX ORDER — HEPARIN SODIUM (PORCINE) LOCK FLUSH IV SOLN 100 UNIT/ML 100 UNIT/ML
300 SOLUTION INTRAVENOUS PRN
Status: DISCONTINUED | OUTPATIENT
Start: 2018-04-10 | End: 2018-04-12 | Stop reason: HOSPADM

## 2018-04-10 RX ADMIN — HEPARIN SODIUM (PORCINE) LOCK FLUSH IV SOLN 100 UNIT/ML 300 UNITS: 100 SOLUTION at 09:15

## 2018-04-10 ASSESSMENT — PAIN SCALES - GENERAL: PAINLEVEL_OUTOF10: 8

## 2018-04-10 ASSESSMENT — PAIN DESCRIPTION - ONSET: ONSET: ON-GOING

## 2018-04-10 ASSESSMENT — PAIN DESCRIPTION - PAIN TYPE: TYPE: ACUTE PAIN;CHRONIC PAIN

## 2018-04-10 ASSESSMENT — PAIN DESCRIPTION - ORIENTATION: ORIENTATION: RIGHT

## 2018-04-10 ASSESSMENT — PAIN DESCRIPTION - PROGRESSION: CLINICAL_PROGRESSION: NOT CHANGED

## 2018-04-10 ASSESSMENT — PAIN DESCRIPTION - DESCRIPTORS: DESCRIPTORS: BURNING

## 2018-04-10 ASSESSMENT — PAIN DESCRIPTION - LOCATION: LOCATION: LEG

## 2018-04-11 ENCOUNTER — HOSPITAL ENCOUNTER (OUTPATIENT)
Dept: ONCOLOGY | Age: 53
Discharge: HOME | End: 2018-04-12
Attending: INTERNAL MEDICINE | Admitting: INTERNAL MEDICINE

## 2018-04-11 VITALS
RESPIRATION RATE: 18 BRPM | SYSTOLIC BLOOD PRESSURE: 115 MMHG | DIASTOLIC BLOOD PRESSURE: 74 MMHG | TEMPERATURE: 98.2 F | HEART RATE: 79 BPM

## 2018-04-11 DIAGNOSIS — M79.89 PAIN AND SWELLING OF RIGHT LOWER LEG: ICD-10-CM

## 2018-04-11 DIAGNOSIS — M79.661 PAIN AND SWELLING OF RIGHT LOWER LEG: ICD-10-CM

## 2018-04-11 RX ORDER — HEPARIN SODIUM (PORCINE) LOCK FLUSH IV SOLN 100 UNIT/ML 100 UNIT/ML
300 SOLUTION INTRAVENOUS PRN
Status: CANCELLED
Start: 2018-04-11

## 2018-04-11 RX ORDER — HEPARIN SODIUM (PORCINE) LOCK FLUSH IV SOLN 100 UNIT/ML 100 UNIT/ML
300 SOLUTION INTRAVENOUS PRN
Status: DISCONTINUED | OUTPATIENT
Start: 2018-04-11 | End: 2018-04-13 | Stop reason: HOSPADM

## 2018-04-12 ENCOUNTER — ANTI-COAG VISIT (OUTPATIENT)
Dept: FAMILY MEDICINE CLINIC | Age: 53
End: 2018-04-12

## 2018-04-12 ENCOUNTER — TELEPHONE (OUTPATIENT)
Dept: FAMILY MEDICINE CLINIC | Age: 53
End: 2018-04-12

## 2018-04-12 ENCOUNTER — HOSPITAL ENCOUNTER (OUTPATIENT)
Dept: ONCOLOGY | Age: 53
Discharge: HOME | End: 2018-04-13
Attending: INTERNAL MEDICINE | Admitting: INTERNAL MEDICINE

## 2018-04-12 VITALS
HEART RATE: 87 BPM | RESPIRATION RATE: 18 BRPM | TEMPERATURE: 98.3 F | DIASTOLIC BLOOD PRESSURE: 76 MMHG | SYSTOLIC BLOOD PRESSURE: 117 MMHG

## 2018-04-12 DIAGNOSIS — M79.661 PAIN AND SWELLING OF RIGHT LOWER LEG: ICD-10-CM

## 2018-04-12 DIAGNOSIS — I82.423 THROMBOSIS OF BOTH ILIAC VEINS (HCC): ICD-10-CM

## 2018-04-12 DIAGNOSIS — M79.89 PAIN AND SWELLING OF RIGHT LOWER LEG: ICD-10-CM

## 2018-04-12 DIAGNOSIS — I89.0 CHRONIC ACQUIRED LYMPHEDEMA: Primary | ICD-10-CM

## 2018-04-12 LAB
INR BLD: 3.35 (ref 0.85–1.15)
PROTHROMBIN TIME: 37.9 SEC (ref 9.6–13)

## 2018-04-12 RX ORDER — HEPARIN SODIUM (PORCINE) LOCK FLUSH IV SOLN 100 UNIT/ML 100 UNIT/ML
300 SOLUTION INTRAVENOUS PRN
Status: DISCONTINUED | OUTPATIENT
Start: 2018-04-12 | End: 2018-04-14 | Stop reason: HOSPADM

## 2018-04-12 RX ORDER — HEPARIN SODIUM (PORCINE) LOCK FLUSH IV SOLN 100 UNIT/ML 100 UNIT/ML
300 SOLUTION INTRAVENOUS PRN
Status: CANCELLED
Start: 2018-04-12

## 2018-04-12 RX ADMIN — HEPARIN SODIUM (PORCINE) LOCK FLUSH IV SOLN 100 UNIT/ML 300 UNITS: 100 SOLUTION at 09:58

## 2018-04-13 ENCOUNTER — HOSPITAL ENCOUNTER (OUTPATIENT)
Dept: WOUND CARE | Age: 53
Discharge: HOME | End: 2018-04-13
Attending: SPECIALIST | Admitting: SPECIALIST

## 2018-04-13 ENCOUNTER — HOSPITAL ENCOUNTER (OUTPATIENT)
Dept: ONCOLOGY | Age: 53
Discharge: HOME | End: 2018-04-14
Attending: INTERNAL MEDICINE | Admitting: INTERNAL MEDICINE

## 2018-04-13 ENCOUNTER — TELEPHONE (OUTPATIENT)
Dept: SURGERY | Age: 53
End: 2018-04-13

## 2018-04-13 VITALS
SYSTOLIC BLOOD PRESSURE: 147 MMHG | DIASTOLIC BLOOD PRESSURE: 94 MMHG | RESPIRATION RATE: 18 BRPM | TEMPERATURE: 97.4 F | HEART RATE: 84 BPM

## 2018-04-13 VITALS
TEMPERATURE: 98.2 F | DIASTOLIC BLOOD PRESSURE: 77 MMHG | HEART RATE: 83 BPM | RESPIRATION RATE: 16 BRPM | SYSTOLIC BLOOD PRESSURE: 123 MMHG

## 2018-04-13 DIAGNOSIS — I82.423 THROMBOSIS OF BOTH ILIAC VEINS (HCC): ICD-10-CM

## 2018-04-13 DIAGNOSIS — M79.661 PAIN AND SWELLING OF RIGHT LOWER LEG: ICD-10-CM

## 2018-04-13 DIAGNOSIS — I87.311 IDIOPATHIC CHRONIC VENOUS HYPERTENSION OF RIGHT LOWER EXTREMITY WITH ULCER (HCC): ICD-10-CM

## 2018-04-13 DIAGNOSIS — M79.89 PAIN AND SWELLING OF RIGHT LOWER LEG: ICD-10-CM

## 2018-04-13 DIAGNOSIS — L97.919 IDIOPATHIC CHRONIC VENOUS HYPERTENSION OF RIGHT LOWER EXTREMITY WITH ULCER (HCC): ICD-10-CM

## 2018-04-13 DIAGNOSIS — I82.220 THROMBOSIS OF INFERIOR VENA CAVA (HCC): ICD-10-CM

## 2018-04-13 DIAGNOSIS — R60.0 LOCALIZED EDEMA: ICD-10-CM

## 2018-04-13 DIAGNOSIS — I89.0 CHRONIC ACQUIRED LYMPHEDEMA: Primary | ICD-10-CM

## 2018-04-13 PROCEDURE — 11045 DBRDMT SUBQ TISS EACH ADDL: CPT | Performed by: SPECIALIST

## 2018-04-13 PROCEDURE — 11042 DBRDMT SUBQ TIS 1ST 20SQCM/<: CPT | Performed by: SPECIALIST

## 2018-04-13 PROCEDURE — 29581 APPL MULTLAYER CMPRN SYS LEG: CPT | Performed by: SPECIALIST

## 2018-04-13 RX ORDER — HEPARIN SODIUM (PORCINE) LOCK FLUSH IV SOLN 100 UNIT/ML 100 UNIT/ML
300 SOLUTION INTRAVENOUS PRN
Status: DISCONTINUED | OUTPATIENT
Start: 2018-04-13 | End: 2018-04-15 | Stop reason: HOSPADM

## 2018-04-13 RX ORDER — HEPARIN SODIUM (PORCINE) LOCK FLUSH IV SOLN 100 UNIT/ML 100 UNIT/ML
300 SOLUTION INTRAVENOUS PRN
Status: CANCELLED
Start: 2018-04-13

## 2018-04-13 RX ORDER — LIDOCAINE HYDROCHLORIDE 40 MG/ML
2.5 SOLUTION TOPICAL ONCE
Status: COMPLETED | OUTPATIENT
Start: 2018-04-13 | End: 2018-04-13

## 2018-04-13 RX ADMIN — HEPARIN SODIUM (PORCINE) LOCK FLUSH IV SOLN 100 UNIT/ML 300 UNITS: 100 SOLUTION at 10:22

## 2018-04-13 RX ADMIN — LIDOCAINE HYDROCHLORIDE 2.5 ML: 40 SOLUTION TOPICAL at 10:57

## 2018-04-13 ASSESSMENT — PAIN DESCRIPTION - FREQUENCY: FREQUENCY: CONTINUOUS

## 2018-04-13 ASSESSMENT — PAIN DESCRIPTION - DESCRIPTORS: DESCRIPTORS: BURNING

## 2018-04-13 ASSESSMENT — PAIN DESCRIPTION - ORIENTATION: ORIENTATION: RIGHT

## 2018-04-13 ASSESSMENT — PAIN DESCRIPTION - PROGRESSION: CLINICAL_PROGRESSION: NOT CHANGED

## 2018-04-13 ASSESSMENT — PAIN DESCRIPTION - PAIN TYPE: TYPE: ACUTE PAIN;CHRONIC PAIN

## 2018-04-13 ASSESSMENT — PAIN DESCRIPTION - ONSET: ONSET: ON-GOING

## 2018-04-13 ASSESSMENT — PAIN DESCRIPTION - LOCATION: LOCATION: LEG

## 2018-04-13 ASSESSMENT — PAIN SCALES - GENERAL: PAINLEVEL_OUTOF10: 8

## 2018-04-14 ENCOUNTER — HOSPITAL ENCOUNTER (OUTPATIENT)
Dept: ONCOLOGY | Age: 53
Discharge: HOME | End: 2018-04-15
Attending: INTERNAL MEDICINE | Admitting: INTERNAL MEDICINE

## 2018-04-14 VITALS
TEMPERATURE: 98.9 F | SYSTOLIC BLOOD PRESSURE: 120 MMHG | RESPIRATION RATE: 18 BRPM | DIASTOLIC BLOOD PRESSURE: 72 MMHG | HEART RATE: 80 BPM

## 2018-04-14 DIAGNOSIS — M79.89 PAIN AND SWELLING OF RIGHT LOWER LEG: ICD-10-CM

## 2018-04-14 DIAGNOSIS — M79.661 PAIN AND SWELLING OF RIGHT LOWER LEG: ICD-10-CM

## 2018-04-14 RX ORDER — HEPARIN SODIUM (PORCINE) LOCK FLUSH IV SOLN 100 UNIT/ML 100 UNIT/ML
300 SOLUTION INTRAVENOUS PRN
Status: DISCONTINUED | OUTPATIENT
Start: 2018-04-14 | End: 2018-04-16 | Stop reason: HOSPADM

## 2018-04-14 RX ORDER — HEPARIN SODIUM (PORCINE) LOCK FLUSH IV SOLN 100 UNIT/ML 100 UNIT/ML
300 SOLUTION INTRAVENOUS PRN
Status: CANCELLED
Start: 2018-04-14

## 2018-04-14 RX ADMIN — HEPARIN SODIUM (PORCINE) LOCK FLUSH IV SOLN 100 UNIT/ML 300 UNITS: 100 SOLUTION at 09:36

## 2018-04-15 ENCOUNTER — HOSPITAL ENCOUNTER (OUTPATIENT)
Dept: ONCOLOGY | Age: 53
Discharge: HOME | End: 2018-04-16
Attending: INTERNAL MEDICINE | Admitting: INTERNAL MEDICINE

## 2018-04-15 VITALS
DIASTOLIC BLOOD PRESSURE: 69 MMHG | HEART RATE: 83 BPM | TEMPERATURE: 98.3 F | SYSTOLIC BLOOD PRESSURE: 115 MMHG | RESPIRATION RATE: 16 BRPM

## 2018-04-15 DIAGNOSIS — M79.89 PAIN AND SWELLING OF RIGHT LOWER LEG: ICD-10-CM

## 2018-04-15 DIAGNOSIS — M79.661 PAIN AND SWELLING OF RIGHT LOWER LEG: ICD-10-CM

## 2018-04-15 RX ORDER — HEPARIN SODIUM (PORCINE) LOCK FLUSH IV SOLN 100 UNIT/ML 100 UNIT/ML
300 SOLUTION INTRAVENOUS PRN
Status: DISCONTINUED | OUTPATIENT
Start: 2018-04-15 | End: 2018-04-17 | Stop reason: HOSPADM

## 2018-04-15 RX ORDER — HEPARIN SODIUM (PORCINE) LOCK FLUSH IV SOLN 100 UNIT/ML 100 UNIT/ML
300 SOLUTION INTRAVENOUS PRN
Status: CANCELLED
Start: 2018-04-15

## 2018-04-15 RX ADMIN — HEPARIN SODIUM (PORCINE) LOCK FLUSH IV SOLN 100 UNIT/ML 300 UNITS: 100 SOLUTION at 09:01

## 2018-04-16 ENCOUNTER — HOSPITAL ENCOUNTER (OUTPATIENT)
Dept: ONCOLOGY | Age: 53
Discharge: HOME | End: 2018-04-17
Attending: INTERNAL MEDICINE | Admitting: INTERNAL MEDICINE

## 2018-04-16 ENCOUNTER — HOSPITAL ENCOUNTER (OUTPATIENT)
Dept: WOUND CARE | Age: 53
Discharge: HOME | End: 2018-04-16
Attending: SPECIALIST | Admitting: SPECIALIST

## 2018-04-16 ENCOUNTER — OFFICE VISIT (OUTPATIENT)
Dept: FAMILY MEDICINE CLINIC | Age: 53
End: 2018-04-16

## 2018-04-16 VITALS
TEMPERATURE: 97.4 F | RESPIRATION RATE: 16 BRPM | DIASTOLIC BLOOD PRESSURE: 78 MMHG | SYSTOLIC BLOOD PRESSURE: 144 MMHG | HEART RATE: 84 BPM

## 2018-04-16 VITALS
OXYGEN SATURATION: 99 % | HEART RATE: 82 BPM | BODY MASS INDEX: 37.35 KG/M2 | DIASTOLIC BLOOD PRESSURE: 78 MMHG | WEIGHT: 238 LBS | TEMPERATURE: 96.5 F | SYSTOLIC BLOOD PRESSURE: 129 MMHG | HEIGHT: 67 IN | RESPIRATION RATE: 16 BRPM

## 2018-04-16 VITALS
TEMPERATURE: 98.1 F | DIASTOLIC BLOOD PRESSURE: 78 MMHG | SYSTOLIC BLOOD PRESSURE: 116 MMHG | HEART RATE: 73 BPM | RESPIRATION RATE: 16 BRPM

## 2018-04-16 DIAGNOSIS — M79.661 PAIN AND SWELLING OF RIGHT LOWER LEG: ICD-10-CM

## 2018-04-16 DIAGNOSIS — Z01.818 PREOP EXAMINATION: Primary | ICD-10-CM

## 2018-04-16 DIAGNOSIS — M79.89 PAIN AND SWELLING OF RIGHT LOWER LEG: ICD-10-CM

## 2018-04-16 DIAGNOSIS — G89.29 CHRONIC PAIN OF RIGHT LOWER EXTREMITY: ICD-10-CM

## 2018-04-16 DIAGNOSIS — I82.5Z1 CHRONIC VENOUS EMBOLISM AND THROMBOSIS OF DEEP VESSELS OF DISTAL END OF RIGHT LOWER EXTREMITY (HCC): ICD-10-CM

## 2018-04-16 DIAGNOSIS — M79.604 CHRONIC PAIN OF RIGHT LOWER EXTREMITY: ICD-10-CM

## 2018-04-16 LAB
ANION GAP SERPL CALCULATED.3IONS-SCNC: 13 MMOL/L (ref 3–16)
BASOPHILS ABSOLUTE: 0 K/UL (ref 0–0.2)
BASOPHILS RELATIVE PERCENT: 0.2 %
BUN BLDV-MCNC: 25 MG/DL (ref 7–20)
C-REACTIVE PROTEIN: 62.6 MG/L (ref 0–5.1)
CALCIUM SERPL-MCNC: 8.6 MG/DL (ref 8.3–10.6)
CHLORIDE BLD-SCNC: 110 MMOL/L (ref 99–110)
CO2: 17 MMOL/L (ref 21–32)
CREAT SERPL-MCNC: 1 MG/DL (ref 0.9–1.3)
EOSINOPHILS ABSOLUTE: 0.3 K/UL (ref 0–0.6)
EOSINOPHILS RELATIVE PERCENT: 5.5 %
GFR AFRICAN AMERICAN: >60
GFR NON-AFRICAN AMERICAN: >60
GLUCOSE BLD-MCNC: 98 MG/DL (ref 70–99)
HCT VFR BLD CALC: 27 % (ref 40.5–52.5)
HEMOGLOBIN: 8.7 G/DL (ref 13.5–17.5)
LYMPHOCYTES ABSOLUTE: 1 K/UL (ref 1–5.1)
LYMPHOCYTES RELATIVE PERCENT: 17.7 %
MCH RBC QN AUTO: 24.5 PG (ref 26–34)
MCHC RBC AUTO-ENTMCNC: 32.3 G/DL (ref 31–36)
MCV RBC AUTO: 75.8 FL (ref 80–100)
MONOCYTES ABSOLUTE: 0.6 K/UL (ref 0–1.3)
MONOCYTES RELATIVE PERCENT: 10.4 %
NEUTROPHILS ABSOLUTE: 3.6 K/UL (ref 1.7–7.7)
NEUTROPHILS RELATIVE PERCENT: 66.2 %
PDW BLD-RTO: 18.5 % (ref 12.4–15.4)
PLATELET # BLD: 305 K/UL (ref 135–450)
PMV BLD AUTO: 6.2 FL (ref 5–10.5)
POTASSIUM SERPL-SCNC: 3.7 MMOL/L (ref 3.5–5.1)
RBC # BLD: 3.57 M/UL (ref 4.2–5.9)
SEDIMENTATION RATE, ERYTHROCYTE: 94 MM/HR (ref 0–20)
SODIUM BLD-SCNC: 140 MMOL/L (ref 136–145)
WBC # BLD: 5.4 K/UL (ref 4–11)

## 2018-04-16 PROCEDURE — 3017F COLORECTAL CA SCREEN DOC REV: CPT | Performed by: FAMILY MEDICINE

## 2018-04-16 PROCEDURE — G8417 CALC BMI ABV UP PARAM F/U: HCPCS | Performed by: FAMILY MEDICINE

## 2018-04-16 PROCEDURE — G8427 DOCREV CUR MEDS BY ELIG CLIN: HCPCS | Performed by: FAMILY MEDICINE

## 2018-04-16 PROCEDURE — 99214 OFFICE O/P EST MOD 30 MIN: CPT | Performed by: FAMILY MEDICINE

## 2018-04-16 PROCEDURE — 93000 ELECTROCARDIOGRAM COMPLETE: CPT | Performed by: FAMILY MEDICINE

## 2018-04-16 RX ORDER — HEPARIN SODIUM (PORCINE) LOCK FLUSH IV SOLN 100 UNIT/ML 100 UNIT/ML
300 SOLUTION INTRAVENOUS PRN
Status: CANCELLED
Start: 2018-04-16

## 2018-04-16 RX ORDER — TRAMADOL HYDROCHLORIDE 50 MG/1
50 TABLET ORAL EVERY 6 HOURS PRN
Qty: 60 TABLET | Refills: 0 | Status: ON HOLD | OUTPATIENT
Start: 2018-04-16 | End: 2018-04-24 | Stop reason: CLARIF

## 2018-04-16 RX ORDER — HEPARIN SODIUM (PORCINE) LOCK FLUSH IV SOLN 100 UNIT/ML 100 UNIT/ML
300 SOLUTION INTRAVENOUS PRN
Status: DISCONTINUED | OUTPATIENT
Start: 2018-04-16 | End: 2018-04-18 | Stop reason: HOSPADM

## 2018-04-16 RX ADMIN — HEPARIN SODIUM (PORCINE) LOCK FLUSH IV SOLN 100 UNIT/ML 300 UNITS: 100 SOLUTION at 09:35

## 2018-04-16 ASSESSMENT — PAIN DESCRIPTION - PROGRESSION: CLINICAL_PROGRESSION: NOT CHANGED

## 2018-04-16 ASSESSMENT — PAIN DESCRIPTION - FREQUENCY: FREQUENCY: CONTINUOUS

## 2018-04-16 ASSESSMENT — PAIN DESCRIPTION - LOCATION: LOCATION: LEG

## 2018-04-16 ASSESSMENT — PAIN SCALES - GENERAL: PAINLEVEL_OUTOF10: 8

## 2018-04-16 ASSESSMENT — PAIN DESCRIPTION - DESCRIPTORS: DESCRIPTORS: BURNING

## 2018-04-16 ASSESSMENT — PAIN DESCRIPTION - PAIN TYPE: TYPE: CHRONIC PAIN;ACUTE PAIN

## 2018-04-16 ASSESSMENT — PAIN DESCRIPTION - ONSET: ONSET: ON-GOING

## 2018-04-16 ASSESSMENT — PAIN DESCRIPTION - ORIENTATION: ORIENTATION: RIGHT

## 2018-04-17 ENCOUNTER — HOSPITAL ENCOUNTER (OUTPATIENT)
Dept: ONCOLOGY | Age: 53
Discharge: HOME | End: 2018-04-18
Attending: INTERNAL MEDICINE | Admitting: INTERNAL MEDICINE

## 2018-04-17 VITALS
RESPIRATION RATE: 16 BRPM | DIASTOLIC BLOOD PRESSURE: 68 MMHG | TEMPERATURE: 98.4 F | HEART RATE: 84 BPM | SYSTOLIC BLOOD PRESSURE: 112 MMHG

## 2018-04-17 DIAGNOSIS — M79.661 PAIN AND SWELLING OF RIGHT LOWER LEG: ICD-10-CM

## 2018-04-17 DIAGNOSIS — M79.89 PAIN AND SWELLING OF RIGHT LOWER LEG: ICD-10-CM

## 2018-04-17 RX ORDER — HEPARIN SODIUM (PORCINE) LOCK FLUSH IV SOLN 100 UNIT/ML 100 UNIT/ML
300 SOLUTION INTRAVENOUS PRN
Status: DISCONTINUED | OUTPATIENT
Start: 2018-04-17 | End: 2018-04-19 | Stop reason: HOSPADM

## 2018-04-17 RX ORDER — HEPARIN SODIUM (PORCINE) LOCK FLUSH IV SOLN 100 UNIT/ML 100 UNIT/ML
300 SOLUTION INTRAVENOUS PRN
Status: CANCELLED
Start: 2018-04-17

## 2018-04-17 RX ADMIN — HEPARIN SODIUM (PORCINE) LOCK FLUSH IV SOLN 100 UNIT/ML 300 UNITS: 100 SOLUTION at 09:41

## 2018-04-18 ENCOUNTER — HOSPITAL ENCOUNTER (OUTPATIENT)
Dept: ONCOLOGY | Age: 53
Discharge: HOME | End: 2018-04-19
Attending: INTERNAL MEDICINE | Admitting: INTERNAL MEDICINE

## 2018-04-18 VITALS
RESPIRATION RATE: 16 BRPM | DIASTOLIC BLOOD PRESSURE: 63 MMHG | SYSTOLIC BLOOD PRESSURE: 109 MMHG | TEMPERATURE: 98.2 F | HEART RATE: 81 BPM

## 2018-04-18 DIAGNOSIS — M79.661 PAIN AND SWELLING OF RIGHT LOWER LEG: ICD-10-CM

## 2018-04-18 DIAGNOSIS — M79.89 PAIN AND SWELLING OF RIGHT LOWER LEG: ICD-10-CM

## 2018-04-18 RX ORDER — HEPARIN SODIUM (PORCINE) LOCK FLUSH IV SOLN 100 UNIT/ML 100 UNIT/ML
300 SOLUTION INTRAVENOUS PRN
Status: CANCELLED
Start: 2018-04-18

## 2018-04-18 RX ORDER — HEPARIN SODIUM (PORCINE) LOCK FLUSH IV SOLN 100 UNIT/ML 100 UNIT/ML
300 SOLUTION INTRAVENOUS PRN
Status: DISCONTINUED | OUTPATIENT
Start: 2018-04-18 | End: 2018-04-20 | Stop reason: HOSPADM

## 2018-04-18 RX ADMIN — HEPARIN SODIUM (PORCINE) LOCK FLUSH IV SOLN 100 UNIT/ML 300 UNITS: 100 SOLUTION at 09:32

## 2018-04-19 ENCOUNTER — HOSPITAL ENCOUNTER (OUTPATIENT)
Dept: ONCOLOGY | Age: 53
Discharge: HOME | End: 2018-04-20
Attending: INTERNAL MEDICINE | Admitting: INTERNAL MEDICINE

## 2018-04-19 VITALS
HEART RATE: 74 BPM | TEMPERATURE: 97.8 F | SYSTOLIC BLOOD PRESSURE: 115 MMHG | RESPIRATION RATE: 16 BRPM | DIASTOLIC BLOOD PRESSURE: 74 MMHG

## 2018-04-19 DIAGNOSIS — M79.661 PAIN AND SWELLING OF RIGHT LOWER LEG: ICD-10-CM

## 2018-04-19 DIAGNOSIS — M79.89 PAIN AND SWELLING OF RIGHT LOWER LEG: ICD-10-CM

## 2018-04-19 RX ORDER — HEPARIN SODIUM (PORCINE) LOCK FLUSH IV SOLN 100 UNIT/ML 100 UNIT/ML
300 SOLUTION INTRAVENOUS PRN
Status: DISCONTINUED | OUTPATIENT
Start: 2018-04-19 | End: 2018-04-21 | Stop reason: HOSPADM

## 2018-04-19 RX ORDER — HEPARIN SODIUM (PORCINE) LOCK FLUSH IV SOLN 100 UNIT/ML 100 UNIT/ML
300 SOLUTION INTRAVENOUS PRN
Status: CANCELLED
Start: 2018-04-19

## 2018-04-19 RX ADMIN — HEPARIN SODIUM (PORCINE) LOCK FLUSH IV SOLN 100 UNIT/ML 300 UNITS: 100 SOLUTION at 10:01

## 2018-04-20 ENCOUNTER — HOSPITAL ENCOUNTER (OUTPATIENT)
Dept: WOUND CARE | Age: 53
Discharge: HOME | End: 2018-04-20
Attending: SPECIALIST | Admitting: SPECIALIST

## 2018-04-20 ENCOUNTER — HOSPITAL ENCOUNTER (OUTPATIENT)
Dept: ONCOLOGY | Age: 53
Discharge: HOME | End: 2018-04-21
Attending: INTERNAL MEDICINE | Admitting: INTERNAL MEDICINE

## 2018-04-20 VITALS
DIASTOLIC BLOOD PRESSURE: 75 MMHG | RESPIRATION RATE: 20 BRPM | SYSTOLIC BLOOD PRESSURE: 135 MMHG | HEART RATE: 93 BPM | TEMPERATURE: 97.4 F

## 2018-04-20 VITALS
TEMPERATURE: 98.2 F | DIASTOLIC BLOOD PRESSURE: 69 MMHG | RESPIRATION RATE: 18 BRPM | SYSTOLIC BLOOD PRESSURE: 109 MMHG | HEART RATE: 77 BPM

## 2018-04-20 DIAGNOSIS — M79.661 PAIN AND SWELLING OF RIGHT LOWER LEG: ICD-10-CM

## 2018-04-20 DIAGNOSIS — L97.919 IDIOPATHIC CHRONIC VENOUS HYPERTENSION OF RIGHT LOWER EXTREMITY WITH ULCER (HCC): Primary | ICD-10-CM

## 2018-04-20 DIAGNOSIS — M79.89 PAIN AND SWELLING OF RIGHT LOWER LEG: ICD-10-CM

## 2018-04-20 DIAGNOSIS — I87.311 IDIOPATHIC CHRONIC VENOUS HYPERTENSION OF RIGHT LOWER EXTREMITY WITH ULCER (HCC): Primary | ICD-10-CM

## 2018-04-20 DIAGNOSIS — I82.220 THROMBOSIS OF INFERIOR VENA CAVA (HCC): ICD-10-CM

## 2018-04-20 DIAGNOSIS — I82.423 THROMBOSIS OF BOTH ILIAC VEINS (HCC): ICD-10-CM

## 2018-04-20 DIAGNOSIS — I89.0 CHRONIC ACQUIRED LYMPHEDEMA: ICD-10-CM

## 2018-04-20 PROCEDURE — 29581 APPL MULTLAYER CMPRN SYS LEG: CPT | Performed by: SPECIALIST

## 2018-04-20 PROCEDURE — 11045 DBRDMT SUBQ TISS EACH ADDL: CPT | Performed by: SPECIALIST

## 2018-04-20 PROCEDURE — 11042 DBRDMT SUBQ TIS 1ST 20SQCM/<: CPT | Performed by: SPECIALIST

## 2018-04-20 RX ORDER — HEPARIN SODIUM (PORCINE) LOCK FLUSH IV SOLN 100 UNIT/ML 100 UNIT/ML
300 SOLUTION INTRAVENOUS PRN
Status: DISCONTINUED | OUTPATIENT
Start: 2018-04-20 | End: 2018-04-22 | Stop reason: HOSPADM

## 2018-04-20 RX ORDER — LIDOCAINE HYDROCHLORIDE 40 MG/ML
2.5 SOLUTION TOPICAL ONCE
Status: COMPLETED | OUTPATIENT
Start: 2018-04-20 | End: 2018-04-20

## 2018-04-20 RX ORDER — HEPARIN SODIUM (PORCINE) LOCK FLUSH IV SOLN 100 UNIT/ML 100 UNIT/ML
300 SOLUTION INTRAVENOUS PRN
Status: CANCELLED
Start: 2018-04-20

## 2018-04-20 RX ADMIN — HEPARIN SODIUM (PORCINE) LOCK FLUSH IV SOLN 100 UNIT/ML 300 UNITS: 100 SOLUTION at 10:48

## 2018-04-20 RX ADMIN — LIDOCAINE HYDROCHLORIDE 2.5 ML: 40 SOLUTION TOPICAL at 11:02

## 2018-04-20 ASSESSMENT — PAIN DESCRIPTION - DESCRIPTORS: DESCRIPTORS: BURNING

## 2018-04-20 ASSESSMENT — PAIN SCALES - GENERAL: PAINLEVEL_OUTOF10: 4

## 2018-04-20 ASSESSMENT — PAIN DESCRIPTION - PROGRESSION: CLINICAL_PROGRESSION: NOT CHANGED

## 2018-04-20 ASSESSMENT — PAIN DESCRIPTION - LOCATION: LOCATION: LEG

## 2018-04-20 ASSESSMENT — PAIN DESCRIPTION - ONSET: ONSET: ON-GOING

## 2018-04-20 ASSESSMENT — PAIN DESCRIPTION - FREQUENCY: FREQUENCY: CONTINUOUS

## 2018-04-20 ASSESSMENT — PAIN DESCRIPTION - ORIENTATION: ORIENTATION: RIGHT

## 2018-04-21 ENCOUNTER — HOSPITAL ENCOUNTER (OUTPATIENT)
Dept: ONCOLOGY | Age: 53
Discharge: HOME | End: 2018-04-22
Attending: INTERNAL MEDICINE | Admitting: INTERNAL MEDICINE

## 2018-04-21 VITALS
DIASTOLIC BLOOD PRESSURE: 67 MMHG | SYSTOLIC BLOOD PRESSURE: 103 MMHG | RESPIRATION RATE: 18 BRPM | TEMPERATURE: 98.3 F | HEART RATE: 80 BPM

## 2018-04-21 DIAGNOSIS — M79.661 PAIN AND SWELLING OF RIGHT LOWER LEG: ICD-10-CM

## 2018-04-21 DIAGNOSIS — M79.89 PAIN AND SWELLING OF RIGHT LOWER LEG: ICD-10-CM

## 2018-04-21 RX ORDER — HEPARIN SODIUM (PORCINE) LOCK FLUSH IV SOLN 100 UNIT/ML 100 UNIT/ML
300 SOLUTION INTRAVENOUS PRN
Status: DISCONTINUED | OUTPATIENT
Start: 2018-04-21 | End: 2018-04-23 | Stop reason: HOSPADM

## 2018-04-21 RX ORDER — HEPARIN SODIUM (PORCINE) LOCK FLUSH IV SOLN 100 UNIT/ML 100 UNIT/ML
300 SOLUTION INTRAVENOUS PRN
Status: CANCELLED
Start: 2018-04-21

## 2018-04-21 RX ADMIN — HEPARIN SODIUM (PORCINE) LOCK FLUSH IV SOLN 100 UNIT/ML 300 UNITS: 100 SOLUTION at 09:36

## 2018-04-22 ENCOUNTER — HOSPITAL ENCOUNTER (OUTPATIENT)
Dept: ONCOLOGY | Age: 53
Discharge: HOME | End: 2018-04-23
Attending: INTERNAL MEDICINE | Admitting: INTERNAL MEDICINE

## 2018-04-22 VITALS
RESPIRATION RATE: 16 BRPM | HEART RATE: 79 BPM | SYSTOLIC BLOOD PRESSURE: 103 MMHG | DIASTOLIC BLOOD PRESSURE: 59 MMHG | TEMPERATURE: 98.3 F

## 2018-04-22 DIAGNOSIS — M79.89 PAIN AND SWELLING OF RIGHT LOWER LEG: ICD-10-CM

## 2018-04-22 DIAGNOSIS — M79.661 PAIN AND SWELLING OF RIGHT LOWER LEG: ICD-10-CM

## 2018-04-22 RX ORDER — HEPARIN SODIUM (PORCINE) LOCK FLUSH IV SOLN 100 UNIT/ML 100 UNIT/ML
300 SOLUTION INTRAVENOUS PRN
Status: CANCELLED
Start: 2018-04-22

## 2018-04-22 RX ORDER — HEPARIN SODIUM (PORCINE) LOCK FLUSH IV SOLN 100 UNIT/ML 100 UNIT/ML
300 SOLUTION INTRAVENOUS PRN
Status: DISCONTINUED | OUTPATIENT
Start: 2018-04-22 | End: 2018-04-24 | Stop reason: HOSPADM

## 2018-04-22 RX ADMIN — HEPARIN SODIUM (PORCINE) LOCK FLUSH IV SOLN 100 UNIT/ML 300 UNITS: 100 SOLUTION at 09:44

## 2018-04-23 ENCOUNTER — HOSPITAL ENCOUNTER (OUTPATIENT)
Dept: ONCOLOGY | Age: 53
Discharge: HOME | End: 2018-04-24
Attending: INTERNAL MEDICINE | Admitting: INTERNAL MEDICINE

## 2018-04-23 ENCOUNTER — HOSPITAL ENCOUNTER (OUTPATIENT)
Dept: WOUND CARE | Age: 53
Discharge: HOME | End: 2018-04-23
Attending: SPECIALIST | Admitting: SPECIALIST

## 2018-04-23 VITALS
HEART RATE: 79 BPM | SYSTOLIC BLOOD PRESSURE: 108 MMHG | TEMPERATURE: 98.3 F | RESPIRATION RATE: 16 BRPM | DIASTOLIC BLOOD PRESSURE: 65 MMHG

## 2018-04-23 VITALS
HEART RATE: 85 BPM | SYSTOLIC BLOOD PRESSURE: 129 MMHG | RESPIRATION RATE: 18 BRPM | TEMPERATURE: 97.7 F | DIASTOLIC BLOOD PRESSURE: 74 MMHG

## 2018-04-23 DIAGNOSIS — M79.89 PAIN AND SWELLING OF RIGHT LOWER LEG: ICD-10-CM

## 2018-04-23 DIAGNOSIS — M79.661 PAIN AND SWELLING OF RIGHT LOWER LEG: ICD-10-CM

## 2018-04-23 LAB
ANION GAP SERPL CALCULATED.3IONS-SCNC: 15 MMOL/L (ref 3–16)
BASOPHILS ABSOLUTE: 0 K/UL (ref 0–0.2)
BASOPHILS RELATIVE PERCENT: 0.3 %
BUN BLDV-MCNC: 30 MG/DL (ref 7–20)
C-REACTIVE PROTEIN: 79.8 MG/L (ref 0–5.1)
CALCIUM SERPL-MCNC: 8.6 MG/DL (ref 8.3–10.6)
CHLORIDE BLD-SCNC: 109 MMOL/L (ref 99–110)
CO2: 16 MMOL/L (ref 21–32)
CREAT SERPL-MCNC: 1.2 MG/DL (ref 0.9–1.3)
EOSINOPHILS ABSOLUTE: 0.3 K/UL (ref 0–0.6)
EOSINOPHILS RELATIVE PERCENT: 4.4 %
GFR AFRICAN AMERICAN: >60
GFR NON-AFRICAN AMERICAN: >60
GLUCOSE BLD-MCNC: 104 MG/DL (ref 70–99)
HCT VFR BLD CALC: 26.2 % (ref 40.5–52.5)
HEMOGLOBIN: 8.3 G/DL (ref 13.5–17.5)
LYMPHOCYTES ABSOLUTE: 1.1 K/UL (ref 1–5.1)
LYMPHOCYTES RELATIVE PERCENT: 17.3 %
MCH RBC QN AUTO: 24.1 PG (ref 26–34)
MCHC RBC AUTO-ENTMCNC: 31.8 G/DL (ref 31–36)
MCV RBC AUTO: 75.7 FL (ref 80–100)
MONOCYTES ABSOLUTE: 0.8 K/UL (ref 0–1.3)
MONOCYTES RELATIVE PERCENT: 11.5 %
NEUTROPHILS ABSOLUTE: 4.4 K/UL (ref 1.7–7.7)
NEUTROPHILS RELATIVE PERCENT: 66.5 %
PDW BLD-RTO: 18.3 % (ref 12.4–15.4)
PLATELET # BLD: 272 K/UL (ref 135–450)
PMV BLD AUTO: 6.2 FL (ref 5–10.5)
POTASSIUM SERPL-SCNC: 3.5 MMOL/L (ref 3.5–5.1)
RBC # BLD: 3.46 M/UL (ref 4.2–5.9)
SEDIMENTATION RATE, ERYTHROCYTE: 107 MM/HR (ref 0–20)
SODIUM BLD-SCNC: 140 MMOL/L (ref 136–145)
WBC # BLD: 6.5 K/UL (ref 4–11)

## 2018-04-23 RX ORDER — HEPARIN SODIUM (PORCINE) LOCK FLUSH IV SOLN 100 UNIT/ML 100 UNIT/ML
300 SOLUTION INTRAVENOUS PRN
Status: DISCONTINUED | OUTPATIENT
Start: 2018-04-23 | End: 2018-04-25 | Stop reason: HOSPADM

## 2018-04-23 RX ORDER — HEPARIN SODIUM (PORCINE) LOCK FLUSH IV SOLN 100 UNIT/ML 100 UNIT/ML
300 SOLUTION INTRAVENOUS PRN
Status: CANCELLED
Start: 2018-04-23

## 2018-04-23 RX ADMIN — HEPARIN SODIUM (PORCINE) LOCK FLUSH IV SOLN 100 UNIT/ML 300 UNITS: 100 SOLUTION at 09:56

## 2018-04-24 PROBLEM — L97.919 LEG ULCER, RIGHT, WITH UNSPECIFIED SEVERITY (HCC): Status: ACTIVE | Noted: 2018-04-24

## 2018-04-24 PROBLEM — I83.009 STASIS ULCER (HCC): Status: ACTIVE | Noted: 2018-04-24

## 2018-04-24 PROBLEM — L97.909 STASIS ULCER (HCC): Status: ACTIVE | Noted: 2018-04-24

## 2018-04-25 PROBLEM — A49.8 PSEUDOMONAS INFECTION: Status: ACTIVE | Noted: 2018-04-25

## 2018-04-27 PROBLEM — R50.9 FEVER: Status: ACTIVE | Noted: 2018-04-27

## 2018-04-29 ENCOUNTER — CARE COORDINATION (OUTPATIENT)
Dept: CASE MANAGEMENT | Age: 53
End: 2018-04-29

## 2018-04-29 DIAGNOSIS — L97.218: Primary | ICD-10-CM

## 2018-04-29 DIAGNOSIS — I83.012: Primary | ICD-10-CM

## 2018-04-29 PROCEDURE — 1111F DSCHRG MED/CURRENT MED MERGE: CPT

## 2018-04-30 ENCOUNTER — HOSPITAL ENCOUNTER (OUTPATIENT)
Dept: WOUND CARE | Age: 53
Discharge: HOME | End: 2018-04-30
Attending: SPECIALIST | Admitting: SPECIALIST

## 2018-04-30 VITALS
DIASTOLIC BLOOD PRESSURE: 82 MMHG | SYSTOLIC BLOOD PRESSURE: 120 MMHG | RESPIRATION RATE: 20 BRPM | HEART RATE: 121 BPM | TEMPERATURE: 98 F

## 2018-04-30 DIAGNOSIS — I87.311 IDIOPATHIC CHRONIC VENOUS HYPERTENSION OF RIGHT LOWER EXTREMITY WITH ULCER (HCC): Primary | ICD-10-CM

## 2018-04-30 DIAGNOSIS — I82.423 THROMBOSIS OF BOTH ILIAC VEINS (HCC): ICD-10-CM

## 2018-04-30 DIAGNOSIS — I89.0 CHRONIC ACQUIRED LYMPHEDEMA: ICD-10-CM

## 2018-04-30 DIAGNOSIS — I82.220 THROMBOSIS OF INFERIOR VENA CAVA (HCC): ICD-10-CM

## 2018-04-30 DIAGNOSIS — L97.919 IDIOPATHIC CHRONIC VENOUS HYPERTENSION OF RIGHT LOWER EXTREMITY WITH ULCER (HCC): Primary | ICD-10-CM

## 2018-04-30 PROCEDURE — 29581 APPL MULTLAYER CMPRN SYS LEG: CPT | Performed by: SPECIALIST

## 2018-04-30 RX ORDER — LIDOCAINE HYDROCHLORIDE 40 MG/ML
2.5 SOLUTION TOPICAL ONCE
Status: COMPLETED | OUTPATIENT
Start: 2018-04-30 | End: 2018-04-30

## 2018-04-30 RX ADMIN — LIDOCAINE HYDROCHLORIDE 2.5 ML: 40 SOLUTION TOPICAL at 10:52

## 2018-04-30 ASSESSMENT — PAIN DESCRIPTION - ORIENTATION: ORIENTATION: LEFT;RIGHT

## 2018-04-30 ASSESSMENT — PAIN DESCRIPTION - ONSET: ONSET: ON-GOING

## 2018-04-30 ASSESSMENT — PAIN DESCRIPTION - PAIN TYPE: TYPE: ACUTE PAIN

## 2018-04-30 ASSESSMENT — PAIN DESCRIPTION - DESCRIPTORS: DESCRIPTORS: ACHING

## 2018-04-30 ASSESSMENT — PAIN DESCRIPTION - FREQUENCY: FREQUENCY: CONTINUOUS

## 2018-04-30 ASSESSMENT — PAIN DESCRIPTION - LOCATION: LOCATION: LEG

## 2018-04-30 ASSESSMENT — PAIN SCALES - GENERAL: PAINLEVEL_OUTOF10: 4

## 2018-04-30 ASSESSMENT — PAIN DESCRIPTION - PROGRESSION: CLINICAL_PROGRESSION: NOT CHANGED

## 2018-05-01 ENCOUNTER — HOSPITAL ENCOUNTER (OUTPATIENT)
Dept: ONCOLOGY | Age: 53
Discharge: HOME | End: 2018-05-31
Attending: INTERNAL MEDICINE | Admitting: INTERNAL MEDICINE

## 2018-05-02 ENCOUNTER — HOSPITAL ENCOUNTER (OUTPATIENT)
Dept: WOUND CARE | Age: 53
Discharge: HOME | End: 2018-05-02
Attending: SPECIALIST | Admitting: SPECIALIST

## 2018-05-02 VITALS
OXYGEN SATURATION: 95 % | HEART RATE: 101 BPM | DIASTOLIC BLOOD PRESSURE: 89 MMHG | RESPIRATION RATE: 20 BRPM | TEMPERATURE: 98.1 F | SYSTOLIC BLOOD PRESSURE: 146 MMHG

## 2018-05-02 ASSESSMENT — PAIN DESCRIPTION - PROGRESSION: CLINICAL_PROGRESSION: NOT CHANGED

## 2018-05-02 ASSESSMENT — PAIN SCALES - GENERAL: PAINLEVEL_OUTOF10: 5

## 2018-05-02 ASSESSMENT — PAIN DESCRIPTION - PAIN TYPE: TYPE: ACUTE PAIN;CHRONIC PAIN;SURGICAL PAIN

## 2018-05-02 ASSESSMENT — PAIN DESCRIPTION - DESCRIPTORS: DESCRIPTORS: ACHING

## 2018-05-02 ASSESSMENT — PAIN DESCRIPTION - ONSET: ONSET: ON-GOING

## 2018-05-02 ASSESSMENT — PAIN DESCRIPTION - FREQUENCY: FREQUENCY: CONTINUOUS

## 2018-05-02 ASSESSMENT — PAIN DESCRIPTION - LOCATION: LOCATION: LEG

## 2018-05-02 ASSESSMENT — PAIN DESCRIPTION - ORIENTATION: ORIENTATION: LEFT;RIGHT

## 2018-05-02 ASSESSMENT — ACTIVITIES OF DAILY LIVING (ADL): EFFECT OF PAIN ON DAILY ACTIVITIES: AMBULATING

## 2018-05-04 ENCOUNTER — HOSPITAL ENCOUNTER (OUTPATIENT)
Dept: WOUND CARE | Age: 53
Discharge: HOME | End: 2018-05-04
Attending: SPECIALIST | Admitting: SPECIALIST

## 2018-05-04 VITALS
SYSTOLIC BLOOD PRESSURE: 132 MMHG | RESPIRATION RATE: 20 BRPM | TEMPERATURE: 97.7 F | HEART RATE: 99 BPM | DIASTOLIC BLOOD PRESSURE: 83 MMHG

## 2018-05-04 ASSESSMENT — PAIN DESCRIPTION - ORIENTATION: ORIENTATION: RIGHT;LEFT

## 2018-05-04 ASSESSMENT — PAIN DESCRIPTION - PROGRESSION: CLINICAL_PROGRESSION: NOT CHANGED

## 2018-05-04 ASSESSMENT — ACTIVITIES OF DAILY LIVING (ADL): EFFECT OF PAIN ON DAILY ACTIVITIES: AMBULATING

## 2018-05-04 ASSESSMENT — PAIN DESCRIPTION - FREQUENCY: FREQUENCY: CONTINUOUS

## 2018-05-04 ASSESSMENT — PAIN SCALES - GENERAL: PAINLEVEL_OUTOF10: 3

## 2018-05-04 ASSESSMENT — PAIN DESCRIPTION - LOCATION: LOCATION: LEG

## 2018-05-04 ASSESSMENT — PAIN DESCRIPTION - ONSET: ONSET: ON-GOING

## 2018-05-04 ASSESSMENT — PAIN DESCRIPTION - PAIN TYPE: TYPE: ACUTE PAIN;CHRONIC PAIN

## 2018-05-04 ASSESSMENT — PAIN DESCRIPTION - DESCRIPTORS: DESCRIPTORS: ACHING;BURNING

## 2018-05-07 ENCOUNTER — HOSPITAL ENCOUNTER (OUTPATIENT)
Dept: WOUND CARE | Age: 53
Discharge: HOME | End: 2018-05-07
Attending: SPECIALIST | Admitting: SPECIALIST

## 2018-05-07 VITALS
SYSTOLIC BLOOD PRESSURE: 110 MMHG | DIASTOLIC BLOOD PRESSURE: 62 MMHG | RESPIRATION RATE: 20 BRPM | HEART RATE: 112 BPM | TEMPERATURE: 98.4 F

## 2018-05-07 DIAGNOSIS — I87.311 IDIOPATHIC CHRONIC VENOUS HYPERTENSION OF RIGHT LOWER EXTREMITY WITH ULCER (HCC): ICD-10-CM

## 2018-05-07 DIAGNOSIS — L97.919 IDIOPATHIC CHRONIC VENOUS HYPERTENSION OF RIGHT LOWER EXTREMITY WITH ULCER (HCC): ICD-10-CM

## 2018-05-07 DIAGNOSIS — Z79.01 CHRONIC ANTICOAGULATION: ICD-10-CM

## 2018-05-07 DIAGNOSIS — I89.0 CHRONIC ACQUIRED LYMPHEDEMA: Primary | ICD-10-CM

## 2018-05-07 DIAGNOSIS — I82.220 THROMBOSIS OF INFERIOR VENA CAVA (HCC): ICD-10-CM

## 2018-05-07 PROCEDURE — 29581 APPL MULTLAYER CMPRN SYS LEG: CPT | Performed by: SPECIALIST

## 2018-05-07 RX ORDER — LIDOCAINE HYDROCHLORIDE 40 MG/ML
2.5 SOLUTION TOPICAL ONCE
Status: DISCONTINUED | OUTPATIENT
Start: 2018-05-07 | End: 2018-05-08 | Stop reason: HOSPADM

## 2018-05-07 ASSESSMENT — PAIN DESCRIPTION - PAIN TYPE: TYPE: ACUTE PAIN

## 2018-05-07 ASSESSMENT — PAIN DESCRIPTION - FREQUENCY: FREQUENCY: CONTINUOUS

## 2018-05-07 ASSESSMENT — PAIN SCALES - GENERAL: PAINLEVEL_OUTOF10: 3

## 2018-05-07 ASSESSMENT — PAIN DESCRIPTION - LOCATION: LOCATION: LEG

## 2018-05-07 ASSESSMENT — PAIN DESCRIPTION - ONSET: ONSET: ON-GOING

## 2018-05-07 ASSESSMENT — PAIN DESCRIPTION - ORIENTATION: ORIENTATION: RIGHT

## 2018-05-07 ASSESSMENT — PAIN DESCRIPTION - PROGRESSION: CLINICAL_PROGRESSION: NOT CHANGED

## 2018-05-07 ASSESSMENT — PAIN DESCRIPTION - DESCRIPTORS: DESCRIPTORS: ACHING;BURNING

## 2018-05-09 ENCOUNTER — HOSPITAL ENCOUNTER (OUTPATIENT)
Dept: WOUND CARE | Age: 53
Discharge: HOME | End: 2018-05-09
Attending: SPECIALIST | Admitting: SPECIALIST

## 2018-05-10 ENCOUNTER — CARE COORDINATION (OUTPATIENT)
Dept: CASE MANAGEMENT | Age: 53
End: 2018-05-10

## 2018-05-11 ENCOUNTER — HOSPITAL ENCOUNTER (OUTPATIENT)
Dept: WOUND CARE | Age: 53
Discharge: HOME | End: 2018-05-11
Attending: SPECIALIST | Admitting: SPECIALIST

## 2018-05-11 VITALS
TEMPERATURE: 97.2 F | DIASTOLIC BLOOD PRESSURE: 80 MMHG | RESPIRATION RATE: 20 BRPM | HEART RATE: 92 BPM | SYSTOLIC BLOOD PRESSURE: 122 MMHG

## 2018-05-11 RX ORDER — OXYCODONE HYDROCHLORIDE AND ACETAMINOPHEN 5; 325 MG/1; MG/1
1 TABLET ORAL EVERY 4 HOURS PRN
COMMUNITY
End: 2018-06-11

## 2018-05-11 ASSESSMENT — PAIN DESCRIPTION - FREQUENCY: FREQUENCY: CONTINUOUS

## 2018-05-11 ASSESSMENT — PAIN DESCRIPTION - PROGRESSION: CLINICAL_PROGRESSION: NOT CHANGED

## 2018-05-11 ASSESSMENT — PAIN SCALES - GENERAL: PAINLEVEL_OUTOF10: 2

## 2018-05-11 ASSESSMENT — PAIN DESCRIPTION - DESCRIPTORS: DESCRIPTORS: ACHING

## 2018-05-11 ASSESSMENT — PAIN DESCRIPTION - ORIENTATION: ORIENTATION: RIGHT

## 2018-05-11 ASSESSMENT — PAIN DESCRIPTION - ONSET: ONSET: ON-GOING

## 2018-05-11 ASSESSMENT — PAIN DESCRIPTION - PAIN TYPE: TYPE: ACUTE PAIN

## 2018-05-11 ASSESSMENT — PAIN DESCRIPTION - LOCATION: LOCATION: LEG

## 2018-05-14 ENCOUNTER — HOSPITAL ENCOUNTER (OUTPATIENT)
Dept: WOUND CARE | Age: 53
Discharge: HOME | End: 2018-05-14
Attending: SPECIALIST | Admitting: SPECIALIST

## 2018-05-14 VITALS
SYSTOLIC BLOOD PRESSURE: 124 MMHG | DIASTOLIC BLOOD PRESSURE: 79 MMHG | TEMPERATURE: 99.3 F | RESPIRATION RATE: 18 BRPM | HEART RATE: 98 BPM

## 2018-05-14 DIAGNOSIS — I89.0 CHRONIC ACQUIRED LYMPHEDEMA: Primary | ICD-10-CM

## 2018-05-14 DIAGNOSIS — I87.311 IDIOPATHIC CHRONIC VENOUS HYPERTENSION OF RIGHT LOWER EXTREMITY WITH ULCER (HCC): ICD-10-CM

## 2018-05-14 DIAGNOSIS — I82.423 THROMBOSIS OF BOTH ILIAC VEINS (HCC): ICD-10-CM

## 2018-05-14 DIAGNOSIS — L97.919 IDIOPATHIC CHRONIC VENOUS HYPERTENSION OF RIGHT LOWER EXTREMITY WITH ULCER (HCC): ICD-10-CM

## 2018-05-14 DIAGNOSIS — I82.220 THROMBOSIS OF INFERIOR VENA CAVA (HCC): ICD-10-CM

## 2018-05-14 PROCEDURE — 11042 DBRDMT SUBQ TIS 1ST 20SQCM/<: CPT | Performed by: SPECIALIST

## 2018-05-14 RX ORDER — LIDOCAINE HYDROCHLORIDE 40 MG/ML
2.5 SOLUTION TOPICAL ONCE
Status: COMPLETED | OUTPATIENT
Start: 2018-05-14 | End: 2018-05-14

## 2018-05-14 RX ADMIN — LIDOCAINE HYDROCHLORIDE 2.5 ML: 40 SOLUTION TOPICAL at 10:46

## 2018-05-14 ASSESSMENT — PAIN DESCRIPTION - PAIN TYPE: TYPE: ACUTE PAIN

## 2018-05-14 ASSESSMENT — PAIN DESCRIPTION - ONSET: ONSET: GRADUAL

## 2018-05-14 ASSESSMENT — PAIN DESCRIPTION - FREQUENCY: FREQUENCY: CONTINUOUS

## 2018-05-14 ASSESSMENT — PAIN DESCRIPTION - ORIENTATION: ORIENTATION: RIGHT

## 2018-05-14 ASSESSMENT — PAIN DESCRIPTION - PROGRESSION: CLINICAL_PROGRESSION: NOT CHANGED

## 2018-05-14 ASSESSMENT — PAIN DESCRIPTION - LOCATION: LOCATION: LEG

## 2018-05-14 ASSESSMENT — PAIN SCALES - GENERAL: PAINLEVEL_OUTOF10: 1

## 2018-05-18 ENCOUNTER — HOSPITAL ENCOUNTER (OUTPATIENT)
Dept: WOUND CARE | Age: 53
Discharge: HOME | End: 2018-05-18
Attending: SPECIALIST | Admitting: SPECIALIST

## 2018-05-18 VITALS
SYSTOLIC BLOOD PRESSURE: 125 MMHG | RESPIRATION RATE: 18 BRPM | HEART RATE: 97 BPM | DIASTOLIC BLOOD PRESSURE: 84 MMHG | TEMPERATURE: 98.3 F

## 2018-05-18 ASSESSMENT — PAIN DESCRIPTION - DESCRIPTORS: DESCRIPTORS: BURNING

## 2018-05-18 ASSESSMENT — PAIN DESCRIPTION - LOCATION: LOCATION: LEG

## 2018-05-18 ASSESSMENT — PAIN DESCRIPTION - PROGRESSION: CLINICAL_PROGRESSION: GRADUALLY IMPROVING

## 2018-05-18 ASSESSMENT — PAIN DESCRIPTION - ORIENTATION: ORIENTATION: RIGHT

## 2018-05-18 ASSESSMENT — PAIN DESCRIPTION - FREQUENCY: FREQUENCY: CONTINUOUS

## 2018-05-21 ENCOUNTER — HOSPITAL ENCOUNTER (OUTPATIENT)
Dept: WOUND CARE | Age: 53
Discharge: HOME | End: 2018-05-21
Attending: SPECIALIST | Admitting: SPECIALIST

## 2018-05-21 VITALS
DIASTOLIC BLOOD PRESSURE: 109 MMHG | SYSTOLIC BLOOD PRESSURE: 140 MMHG | TEMPERATURE: 98.2 F | HEART RATE: 97 BPM | RESPIRATION RATE: 16 BRPM

## 2018-05-21 DIAGNOSIS — I87.311 IDIOPATHIC CHRONIC VENOUS HYPERTENSION OF RIGHT LOWER EXTREMITY WITH ULCER (HCC): ICD-10-CM

## 2018-05-21 DIAGNOSIS — I89.0 CHRONIC ACQUIRED LYMPHEDEMA: Primary | ICD-10-CM

## 2018-05-21 DIAGNOSIS — L97.919 IDIOPATHIC CHRONIC VENOUS HYPERTENSION OF RIGHT LOWER EXTREMITY WITH ULCER (HCC): ICD-10-CM

## 2018-05-21 DIAGNOSIS — I82.423 THROMBOSIS OF BOTH ILIAC VEINS (HCC): ICD-10-CM

## 2018-05-21 DIAGNOSIS — I82.220 THROMBOSIS OF INFERIOR VENA CAVA (HCC): ICD-10-CM

## 2018-05-21 PROCEDURE — 11042 DBRDMT SUBQ TIS 1ST 20SQCM/<: CPT | Performed by: SPECIALIST

## 2018-05-21 RX ORDER — LIDOCAINE HYDROCHLORIDE 40 MG/ML
2.5 SOLUTION TOPICAL ONCE
Status: COMPLETED | OUTPATIENT
Start: 2018-05-21 | End: 2018-05-21

## 2018-05-21 RX ADMIN — LIDOCAINE HYDROCHLORIDE 2.5 ML: 40 SOLUTION TOPICAL at 09:33

## 2018-05-21 ASSESSMENT — PAIN DESCRIPTION - ORIENTATION: ORIENTATION: RIGHT

## 2018-05-21 ASSESSMENT — PAIN DESCRIPTION - PAIN TYPE: TYPE: ACUTE PAIN

## 2018-05-21 ASSESSMENT — PAIN DESCRIPTION - PROGRESSION: CLINICAL_PROGRESSION: GRADUALLY IMPROVING

## 2018-05-21 ASSESSMENT — PAIN DESCRIPTION - LOCATION: LOCATION: LEG

## 2018-05-21 ASSESSMENT — PAIN DESCRIPTION - DESCRIPTORS: DESCRIPTORS: BURNING

## 2018-05-21 ASSESSMENT — PAIN DESCRIPTION - ONSET: ONSET: GRADUAL

## 2018-05-21 ASSESSMENT — PAIN DESCRIPTION - FREQUENCY: FREQUENCY: CONTINUOUS

## 2018-05-21 ASSESSMENT — PAIN SCALES - GENERAL: PAINLEVEL_OUTOF10: 1

## 2018-05-25 ENCOUNTER — HOSPITAL ENCOUNTER (OUTPATIENT)
Dept: WOUND CARE | Age: 53
Discharge: HOME | End: 2018-05-25
Attending: SPECIALIST | Admitting: SPECIALIST

## 2018-05-25 VITALS — TEMPERATURE: 98.2 F | SYSTOLIC BLOOD PRESSURE: 124 MMHG | DIASTOLIC BLOOD PRESSURE: 79 MMHG | RESPIRATION RATE: 16 BRPM

## 2018-05-25 ASSESSMENT — PAIN DESCRIPTION - PAIN TYPE: TYPE: ACUTE PAIN

## 2018-05-25 ASSESSMENT — PAIN SCALES - GENERAL: PAINLEVEL_OUTOF10: 6

## 2018-05-25 ASSESSMENT — PAIN DESCRIPTION - LOCATION: LOCATION: ARM

## 2018-05-25 ASSESSMENT — PAIN DESCRIPTION - ORIENTATION: ORIENTATION: RIGHT

## 2018-05-30 ENCOUNTER — HOSPITAL ENCOUNTER (OUTPATIENT)
Dept: WOUND CARE | Age: 53
Discharge: HOME | End: 2018-05-30
Attending: SPECIALIST | Admitting: SPECIALIST

## 2018-05-30 VITALS
SYSTOLIC BLOOD PRESSURE: 138 MMHG | TEMPERATURE: 97.6 F | DIASTOLIC BLOOD PRESSURE: 81 MMHG | RESPIRATION RATE: 16 BRPM | HEART RATE: 106 BPM

## 2018-05-30 DIAGNOSIS — L97.919 IDIOPATHIC CHRONIC VENOUS HYPERTENSION OF RIGHT LOWER EXTREMITY WITH ULCER (HCC): Primary | ICD-10-CM

## 2018-05-30 DIAGNOSIS — I87.311 IDIOPATHIC CHRONIC VENOUS HYPERTENSION OF RIGHT LOWER EXTREMITY WITH ULCER (HCC): Primary | ICD-10-CM

## 2018-05-30 DIAGNOSIS — I82.220 THROMBOSIS OF INFERIOR VENA CAVA (HCC): ICD-10-CM

## 2018-05-30 DIAGNOSIS — R60.0 LOCALIZED EDEMA: ICD-10-CM

## 2018-05-30 PROCEDURE — 29581 APPL MULTLAYER CMPRN SYS LEG: CPT | Performed by: SPECIALIST

## 2018-05-30 RX ORDER — LIDOCAINE HYDROCHLORIDE 40 MG/ML
2.5 SOLUTION TOPICAL ONCE
Status: COMPLETED | OUTPATIENT
Start: 2018-05-30 | End: 2018-05-30

## 2018-05-30 RX ADMIN — LIDOCAINE HYDROCHLORIDE 2.5 ML: 40 SOLUTION TOPICAL at 09:16

## 2018-06-04 ENCOUNTER — HOSPITAL ENCOUNTER (OUTPATIENT)
Dept: WOUND CARE | Age: 53
Discharge: HOME | End: 2018-06-04
Attending: SPECIALIST | Admitting: SPECIALIST

## 2018-06-04 VITALS
SYSTOLIC BLOOD PRESSURE: 135 MMHG | TEMPERATURE: 98.4 F | HEART RATE: 92 BPM | RESPIRATION RATE: 16 BRPM | DIASTOLIC BLOOD PRESSURE: 76 MMHG

## 2018-06-04 DIAGNOSIS — I82.220 THROMBOSIS OF INFERIOR VENA CAVA (HCC): ICD-10-CM

## 2018-06-04 DIAGNOSIS — I87.311 IDIOPATHIC CHRONIC VENOUS HYPERTENSION OF RIGHT LOWER EXTREMITY WITH ULCER (HCC): ICD-10-CM

## 2018-06-04 DIAGNOSIS — L97.919 IDIOPATHIC CHRONIC VENOUS HYPERTENSION OF RIGHT LOWER EXTREMITY WITH ULCER (HCC): ICD-10-CM

## 2018-06-04 DIAGNOSIS — I89.0 CHRONIC ACQUIRED LYMPHEDEMA: Primary | ICD-10-CM

## 2018-06-04 PROCEDURE — 29581 APPL MULTLAYER CMPRN SYS LEG: CPT | Performed by: SPECIALIST

## 2018-06-04 RX ORDER — LIDOCAINE HYDROCHLORIDE 40 MG/ML
2.5 SOLUTION TOPICAL ONCE
Status: DISCONTINUED | OUTPATIENT
Start: 2018-06-04 | End: 2018-06-05 | Stop reason: HOSPADM

## 2018-06-06 ENCOUNTER — TELEPHONE (OUTPATIENT)
Dept: WOUND CARE | Age: 53
End: 2018-06-06

## 2018-06-11 ENCOUNTER — HOSPITAL ENCOUNTER (OUTPATIENT)
Dept: WOUND CARE | Age: 53
Discharge: HOME | End: 2018-06-11
Attending: SPECIALIST | Admitting: SPECIALIST

## 2018-06-11 VITALS
DIASTOLIC BLOOD PRESSURE: 90 MMHG | HEART RATE: 80 BPM | RESPIRATION RATE: 16 BRPM | TEMPERATURE: 98 F | SYSTOLIC BLOOD PRESSURE: 137 MMHG

## 2018-06-11 DIAGNOSIS — I89.0 CHRONIC ACQUIRED LYMPHEDEMA: Primary | ICD-10-CM

## 2018-06-11 DIAGNOSIS — R60.0 LOCALIZED EDEMA: ICD-10-CM

## 2018-06-11 DIAGNOSIS — L97.919 IDIOPATHIC CHRONIC VENOUS HYPERTENSION OF RIGHT LOWER EXTREMITY WITH ULCER (HCC): ICD-10-CM

## 2018-06-11 DIAGNOSIS — I87.311 IDIOPATHIC CHRONIC VENOUS HYPERTENSION OF RIGHT LOWER EXTREMITY WITH ULCER (HCC): ICD-10-CM

## 2018-06-11 DIAGNOSIS — I82.423 THROMBOSIS OF BOTH ILIAC VEINS (HCC): ICD-10-CM

## 2018-06-11 PROCEDURE — 29581 APPL MULTLAYER CMPRN SYS LEG: CPT | Performed by: SPECIALIST

## 2018-06-11 RX ORDER — LIDOCAINE HYDROCHLORIDE 40 MG/ML
SOLUTION TOPICAL ONCE
Status: COMPLETED | OUTPATIENT
Start: 2018-06-11 | End: 2018-06-11

## 2018-06-11 RX ADMIN — LIDOCAINE HYDROCHLORIDE: 40 SOLUTION TOPICAL at 09:51

## 2018-06-18 ENCOUNTER — HOSPITAL ENCOUNTER (OUTPATIENT)
Dept: WOUND CARE | Age: 53
Discharge: HOME | End: 2018-06-18
Attending: SPECIALIST | Admitting: SPECIALIST

## 2018-06-18 VITALS
TEMPERATURE: 98.1 F | RESPIRATION RATE: 16 BRPM | SYSTOLIC BLOOD PRESSURE: 134 MMHG | HEART RATE: 78 BPM | DIASTOLIC BLOOD PRESSURE: 95 MMHG

## 2018-06-18 DIAGNOSIS — R60.0 LOCALIZED EDEMA: ICD-10-CM

## 2018-06-18 DIAGNOSIS — I82.220 THROMBOSIS OF INFERIOR VENA CAVA (HCC): Primary | ICD-10-CM

## 2018-06-18 DIAGNOSIS — I82.423 THROMBOSIS OF BOTH ILIAC VEINS (HCC): ICD-10-CM

## 2018-06-18 DIAGNOSIS — L97.919 IDIOPATHIC CHRONIC VENOUS HYPERTENSION OF RIGHT LOWER EXTREMITY WITH ULCER (HCC): ICD-10-CM

## 2018-06-18 DIAGNOSIS — I89.0 CHRONIC ACQUIRED LYMPHEDEMA: ICD-10-CM

## 2018-06-18 DIAGNOSIS — I87.311 IDIOPATHIC CHRONIC VENOUS HYPERTENSION OF RIGHT LOWER EXTREMITY WITH ULCER (HCC): ICD-10-CM

## 2018-06-18 DIAGNOSIS — Z79.01 CHRONIC ANTICOAGULATION: ICD-10-CM

## 2018-06-18 PROCEDURE — 15271 SKIN SUB GRAFT TRNK/ARM/LEG: CPT | Performed by: SPECIALIST

## 2018-06-18 PROCEDURE — 29581 APPL MULTLAYER CMPRN SYS LEG: CPT | Performed by: SPECIALIST

## 2018-06-18 RX ORDER — LIDOCAINE HYDROCHLORIDE 40 MG/ML
2.5 SOLUTION TOPICAL ONCE
Status: DISCONTINUED | OUTPATIENT
Start: 2018-06-18 | End: 2018-06-19 | Stop reason: HOSPADM

## 2018-06-18 ASSESSMENT — PAIN SCALES - GENERAL: PAINLEVEL_OUTOF10: 0

## 2018-06-25 ENCOUNTER — HOSPITAL ENCOUNTER (OUTPATIENT)
Dept: WOUND CARE | Age: 53
Discharge: HOME | End: 2018-06-25
Attending: SPECIALIST | Admitting: SPECIALIST

## 2018-06-25 VITALS
DIASTOLIC BLOOD PRESSURE: 80 MMHG | TEMPERATURE: 98.1 F | HEART RATE: 73 BPM | RESPIRATION RATE: 18 BRPM | SYSTOLIC BLOOD PRESSURE: 129 MMHG

## 2018-06-25 DIAGNOSIS — Z79.01 CHRONIC ANTICOAGULATION: ICD-10-CM

## 2018-06-25 DIAGNOSIS — I82.220 THROMBOSIS OF INFERIOR VENA CAVA (HCC): ICD-10-CM

## 2018-06-25 DIAGNOSIS — L97.919 IDIOPATHIC CHRONIC VENOUS HYPERTENSION OF RIGHT LOWER EXTREMITY WITH ULCER (HCC): Primary | ICD-10-CM

## 2018-06-25 DIAGNOSIS — I87.311 IDIOPATHIC CHRONIC VENOUS HYPERTENSION OF RIGHT LOWER EXTREMITY WITH ULCER (HCC): Primary | ICD-10-CM

## 2018-06-25 DIAGNOSIS — I89.0 CHRONIC ACQUIRED LYMPHEDEMA: ICD-10-CM

## 2018-06-25 DIAGNOSIS — I82.423 THROMBOSIS OF BOTH ILIAC VEINS (HCC): ICD-10-CM

## 2018-06-25 PROCEDURE — 11042 DBRDMT SUBQ TIS 1ST 20SQCM/<: CPT | Performed by: SPECIALIST

## 2018-06-25 PROCEDURE — 29581 APPL MULTLAYER CMPRN SYS LEG: CPT | Performed by: SPECIALIST

## 2018-06-25 RX ORDER — LIDOCAINE HYDROCHLORIDE 40 MG/ML
2.5 SOLUTION TOPICAL ONCE
Status: COMPLETED | OUTPATIENT
Start: 2018-06-25 | End: 2018-06-25

## 2018-06-25 RX ADMIN — LIDOCAINE HYDROCHLORIDE 2.5 ML: 40 SOLUTION TOPICAL at 09:08

## 2018-06-29 ENCOUNTER — HOSPITAL ENCOUNTER (OUTPATIENT)
Dept: WOUND CARE | Age: 53
Discharge: HOME | End: 2018-06-29
Attending: SPECIALIST | Admitting: SPECIALIST

## 2018-06-29 VITALS
TEMPERATURE: 97.4 F | SYSTOLIC BLOOD PRESSURE: 137 MMHG | HEART RATE: 83 BPM | DIASTOLIC BLOOD PRESSURE: 76 MMHG | RESPIRATION RATE: 16 BRPM

## 2018-07-02 ENCOUNTER — HOSPITAL ENCOUNTER (OUTPATIENT)
Dept: WOUND CARE | Age: 53
Discharge: HOME | End: 2018-07-02
Attending: SPECIALIST | Admitting: SPECIALIST

## 2018-07-02 VITALS
TEMPERATURE: 97.8 F | DIASTOLIC BLOOD PRESSURE: 77 MMHG | RESPIRATION RATE: 18 BRPM | SYSTOLIC BLOOD PRESSURE: 125 MMHG | HEART RATE: 76 BPM

## 2018-07-02 DIAGNOSIS — I82.423 THROMBOSIS OF BOTH ILIAC VEINS (HCC): ICD-10-CM

## 2018-07-02 DIAGNOSIS — I87.311 IDIOPATHIC CHRONIC VENOUS HYPERTENSION OF RIGHT LOWER EXTREMITY WITH ULCER (HCC): ICD-10-CM

## 2018-07-02 DIAGNOSIS — I82.220 THROMBOSIS OF INFERIOR VENA CAVA (HCC): ICD-10-CM

## 2018-07-02 DIAGNOSIS — I89.0 CHRONIC ACQUIRED LYMPHEDEMA: Primary | ICD-10-CM

## 2018-07-02 DIAGNOSIS — Z79.01 CHRONIC ANTICOAGULATION: ICD-10-CM

## 2018-07-02 DIAGNOSIS — L97.919 IDIOPATHIC CHRONIC VENOUS HYPERTENSION OF RIGHT LOWER EXTREMITY WITH ULCER (HCC): ICD-10-CM

## 2018-07-02 PROCEDURE — 29581 APPL MULTLAYER CMPRN SYS LEG: CPT | Performed by: SPECIALIST

## 2018-07-02 PROCEDURE — 15271 SKIN SUB GRAFT TRNK/ARM/LEG: CPT | Performed by: SPECIALIST

## 2018-07-02 RX ORDER — LIDOCAINE HYDROCHLORIDE 40 MG/ML
2.5 SOLUTION TOPICAL ONCE
Status: COMPLETED | OUTPATIENT
Start: 2018-07-02 | End: 2018-07-02

## 2018-07-02 RX ADMIN — LIDOCAINE HYDROCHLORIDE 2.5 ML: 40 SOLUTION TOPICAL at 09:04

## 2018-07-02 NOTE — PROGRESS NOTES
[facility]  Progress Note and Procedure Note      Justin Baeza  AGE: 48 y.o. GENDER: male  : 1965  EPISODE DATE:  2018      Subjective:     Chief Complaint   Patient presents with    Wound Check     f/u right lower leg wounds         HISTORY of PRESENT ILLNESS HPI     Carrie Avalos is a 48 y.o. male who presents today for wound evaluation. 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 Underwent unsuccessfull dilatation of IVC at the . He has been grafted with bedrest, elevation and lymphedema pumps with remarkable improvement. However, he has gone back to work against medical advice and is on his feet constantly.   Wound Pain Timing/Severity: none  Quality of pain: N/A  Severity:  0 / 10   Modifying Factors: None  Associated Signs/Symptoms: edema    Wound Identification:  Wound Type: venous  Contributing Factors: edema, venous stasis, lymphedema, obesity and anticoagulation therapy        PAST MEDICAL HISTORY        Diagnosis Date    DVT (deep venous thrombosis) (HCC)     Pain and swelling of right lower leg        PAST SURGICAL HISTORY    Past Surgical History:   Procedure Laterality Date    BALLOON ANGIOPLASTY, ARTERY Right 2017    at [facility] [address] Right        FAMILY HISTORY    Family History   Problem Relation Age of Onset    Diabetes Mother     Heart Attack Father        SOCIAL HISTORY    Social History   Substance Use Topics    Smoking status: Never Smoker    Smokeless tobacco: Never Used    Alcohol use No       ALLERGIES    No Known Allergies    MEDICATIONS    Current Outpatient Prescriptions on File Prior to Encounter   Medication Sig Dispense Refill    warfarin (COUMADIN) 5 MG tablet Please continue to take your home warfarin as prescribed  5mg dose  1 tablet 0    Handicap Placard MISC by Does not apply route Dx Lower loeg DVT chronic, bilateral. Effective 2018 Swelling;Yellow; Red 7/2/2018  8:49 AM   Drainage Amount Small 7/2/2018  8:49 AM   Drainage Description Brown;Serosanguinous 7/2/2018  8:49 AM   Odor Mild 7/2/2018  8:49 AM   Margins Undefined edges 7/2/2018  8:49 AM   Kiah-wound Assessment Yellow-brown;Pink 7/2/2018  8:49 AM   Non-staged Wound Description Full thickness 7/2/2018  8:49 AM   Red%Wound Bed 30 7/2/2018  8:49 AM   Yellow%Wound Bed 70 7/2/2018  8:49 AM   Op First Treatment Date 05/14/18 6/4/2018  9:36 AM   Number of days: 49     Procedure:  Skin Substitute Application    Performed by: Coreen Elkins MD    Ulcer Type: venous    Consent obtained: Yes    Time out taken: Yes    Product Utilized:      Apligraf    [x] 44 sq/cm   [] 88 sq/cm    [] 132 sq/cm    Dermagraft    [] 38 sq/cm   [] 76 sq/cm    PuraPly AM    [] 1.6 sq/cm  [] 4 sq/cm  [] 8 sq/cm    [] 25 sq/cm   [] 54sq/cm     NuShield    [] 1.6 sq/cm   [] 6 sq/cm    [] 8 sq/cm   [] 12 sq/cm [] 16 sq/cm [] 24 sq/cm    Affinity    [] 2.25 sq/cm   [] 6.25 sq/cm            EpiFix    [] 14 mm disc (1.54 cm)  [] 18 mm disc  [] 4 sq/cm                                     [] 6 sq/cm    [] 8 sq/cm  [] 11 sq/cm     Epicord  [] 6 sq/cm        TheraSkin [] 13 sq/cm    [] 39 sq/cm                  Fenestrated: No    Mesher Utilized: No    Instrument(s) scissors and forceps    Skin Substitute was Applied to Ulcer Number(s):    Ulcer #: 6      Wound 03/06/17 Leg Right; Lower; Anterior;Distal #1  venous/lymphedema (stage 3)  (Active)   Wound Image   6/25/2018  8:43 AM   Wound Type Wound 7/2/2018  8:49 AM   Wound Venous 7/2/2018  8:49 AM   Dressing Status Old drainage 4/6/2018 11:32 AM   Dressing/Treatment Other (Comment) 7/2/2018  8:49 AM   Wound Cleansed Rinsed/Irrigated with saline 7/2/2018  8:49 AM   Dressing Change Due 02/16/18 2/12/2018  9:57 AM   Wound Length (cm) 0.5 cm 7/2/2018  8:49 AM   Wound Width (cm) 0.7 cm 7/2/2018  8:49 AM   Wound Depth (cm)  0.1 7/2/2018  8:49 AM   Calculated Wound Size (cm^2) (l*w) 0.35 Non-staged Wound Description Full thickness 7/2/2018  8:49 AM   Red%Wound Bed 90 7/2/2018  8:49 AM   Yellow%Wound Bed 10 7/2/2018  8:49 AM   Other%Wound Bed 0 7/2/2018  8:49 AM   Op First Treatment Date 05/14/18 6/4/2018  9:36 AM   Number of days: 49       Wound 05/14/18 #6 right lower leg posterior venous/ lymphedema  (Active)   Wound Image   6/25/2018  8:43 AM   Wound Type Wound 7/2/2018  8:49 AM   Wound Venous 7/2/2018  8:49 AM   Dressing/Treatment Other (Comment) 7/2/2018  8:49 AM   Wound Cleansed Rinsed/Irrigated with saline 7/2/2018  8:49 AM   Wound Length (cm) 3.1 cm 7/2/2018  8:49 AM   Wound Width (cm) 7 cm 7/2/2018  8:49 AM   Wound Depth (cm)  0.1 7/2/2018  8:49 AM   Calculated Wound Size (cm^2) (l*w) 21.7 cm^2 7/2/2018  8:49 AM   Change in Wound Size % (l*w) 32.71 7/2/2018  8:49 AM   Distance Tunneling (cm) 0 cm 7/2/2018  8:49 AM   Undermining Maxium Distance (cm) 0 7/2/2018  8:49 AM   Wound Assessment Swelling;Yellow; Red 7/2/2018  8:49 AM   Drainage Amount Small 7/2/2018  8:49 AM   Drainage Description Brown;Serosanguinous 7/2/2018  8:49 AM   Odor Mild 7/2/2018  8:49 AM   Margins Undefined edges 7/2/2018  8:49 AM   Kiah-wound Assessment Yellow-brown;Pink 7/2/2018  8:49 AM   Non-staged Wound Description Full thickness 7/2/2018  8:49 AM   Red%Wound Bed 30 7/2/2018  8:49 AM   Yellow%Wound Bed 70 7/2/2018  8:49 AM   Op First Treatment Date 05/14/18 6/4/2018  9:36 AM   Number of days: 49       Diabetic/Pressure/Non Pressure Ulcers:  Ulcer: Non-Pressure ulcer, fat layer exposed      Total Surface Area of Ulcer(s) Covered 21.7 sq/cm    Was the Product Layered  No     Amount of Product Applied 21.7 sq/cm     Amount of Product Wasted 22.3 sq/cm     Reason for Waste Wound Size smaller then product size      Surgically Fixated: No    Secured With: Steri Strips and Mepitel     Procedural Pain: 0/10     Post Procedural Pain: 0 / 10    Response to Treatment: Well tolerated by patient.     Problem List Items Addressed This Visit     None                          Plan:     Treatment Note please see attached Discharge Instructions    In my professional opinion this patient would benefit from HBO Therapy: No       Patient is to return to wound care center in:  1 week(s)    Written patient dismissal instructions given to patient and signed by patient or POA. Discharge [facility] Physician Orders and Discharge Instructions  Jack Natarajan [id number] [place] [address] [facility]  Telephone: [phone]    NAME:  Fabien Bañuelos  YOB: 1965  MEDICAL RECORD NUMBER:  [id number]  DATE:  7/2/2018    Wash hands with soap and water prior to and after every dressing change. Wound Cleansing:   · Do not scrub or use excessive force. · With each dressing change, rinse wounds with 0.9% Saline. (May use wound wash or soft contact solution. Both can be purchased at a local drug store). · If unable to obtain saline, may use a gentle soap and water. · Keep wounds dry in the shower unless otherwise instructed by the physician. Topical Treatments:  Do not apply lotions, creams, or ointments to wound bed unless directed. [] Apply moisturizing lotion to skin surrounding the wound prior to dressing change.  [] Apply antifungal ointment to skin surrounding the wound prior to dressing change.  [] Apply thin film of moisture barrier ointment to skin immediately around wound.   [] Other:      Dressings:           Wound Location:  Right lower leg wounds except posterior wound     [x] Apply Primary Dressing to wound:       [] Foam/Foam with Border(i.e Mepilex) [] Non-adherent (i.e.Mepitel)   [] Alginate with Silver (i.e. Silvercel)   [] Alginate   [x] Collagen (i.e. Puracol) [] Collagen with Silver(i.e. Candice)     [] Hydrocolloid  [] Hydrafera Blue moistened with saline   [] MediHoney Paste/Gel [] Hydrogel   [] Endoform      [] Santyl covered with hour increments twice daily      [x]Elevate leg(s) above the level of the heart for 30 minutes 4-5 times a day and/or when sitting. [x] Avoid prolonged standing in one place. Multilayer Compression Wrap:  Type: Profore 4 Layer Wrap      Applied in Clinic to the :  Right lower leg(s)     · Do not get leg(s) with wrap wet. ·  If wraps become too tight call the center or completely remove the wrap. · Elevate leg(s) above the level of the heart when sitting. · Avoid prolonged standing in one place. [] [facility] remove wrap, cleanse affected leg(s) with soap and water, apply wound dressings as ordered above and reapply compression wraps on:       Dietary:  Important dietary reminders:  1. Increase Protein intake (i.e. Lean meats, fish, eggs, legumes, and yogurt)  2. No added salt  3. If diabetic, follow a diabetic diet and check glucose prior to meals or as instructed by your physician. Return Appointment:  [] Wound and dressing supply provider:   [] ECF or Home Healthcare:  [x] Nurse visit:  May return Friday this week if compression wrap falls  [x] Return Appointment: With Dr. Mackenzie Quan   in  [facility])      Your nurse  is: Ant Marshall     Electronically signed by Rosanna Samson RN on 7/2/2018 at 9:38 AM     Christine Albarran [address]: Should you experience any significant changes in your wound(s) or have questions about your wound care, please contact the [address] [place] [address] at [phone] Monday-Friday from 8:00 am - 4:30 pm except for Wednesdays which hours are from 8:00 am - 2:00 pm.   If you need help with your wound outside these hours and cannot wait until we are again available, contact your PCP or go to the hospital emergency room. PLEASE NOTE: IF YOU ARE UNABLE TO OBTAIN WOUND SUPPLIES, CONTINUE TO USE THE SUPPLIES YOU HAVE AVAILABLE UNTIL YOU ARE ABLE TO REACH US.  IT IS MOST IMPORTANT TO KEEP THE WOUND COVERED AT ALL TIMES. Physician orders by:     [] Dr Norma Giang [] Dr Raf Neal    [] Dr Kandace Rojas     [] Dr Cici Lofton   [x] Dr Margot Drake  [] Dr Henry Brown  [] Dr Jacob Lopez       Physician Signature:__________________________________        The information contained in the After Visit Summary has been reviewed with me, the patient and/or responsible adult, by my health care provider(s). I had the opportunity to ask questions regarding this information.   I have elected to receive;      [] Patient unable to sign Discharge Instructions given to ECF/Transportation/POA            Electronically signed by Barb Hernadez MD on 7/2/2018 at 11:40 AM

## 2018-07-06 ENCOUNTER — HOSPITAL ENCOUNTER (OUTPATIENT)
Dept: WOUND CARE | Age: 53
Discharge: HOME | End: 2018-07-06
Attending: SPECIALIST | Admitting: SPECIALIST

## 2018-07-06 VITALS
DIASTOLIC BLOOD PRESSURE: 80 MMHG | TEMPERATURE: 98.1 F | RESPIRATION RATE: 20 BRPM | SYSTOLIC BLOOD PRESSURE: 177 MMHG | HEART RATE: 73 BPM

## 2018-07-09 ENCOUNTER — HOSPITAL ENCOUNTER (OUTPATIENT)
Dept: WOUND CARE | Age: 53
Discharge: HOME | End: 2018-07-09
Attending: SPECIALIST | Admitting: SPECIALIST

## 2018-07-09 VITALS
SYSTOLIC BLOOD PRESSURE: 130 MMHG | TEMPERATURE: 98.3 F | RESPIRATION RATE: 20 BRPM | DIASTOLIC BLOOD PRESSURE: 80 MMHG | HEART RATE: 71 BPM

## 2018-07-09 DIAGNOSIS — L97.919 IDIOPATHIC CHRONIC VENOUS HYPERTENSION OF RIGHT LOWER EXTREMITY WITH ULCER (HCC): ICD-10-CM

## 2018-07-09 DIAGNOSIS — I82.220 THROMBOSIS OF INFERIOR VENA CAVA (HCC): Primary | ICD-10-CM

## 2018-07-09 DIAGNOSIS — I87.311 IDIOPATHIC CHRONIC VENOUS HYPERTENSION OF RIGHT LOWER EXTREMITY WITH ULCER (HCC): ICD-10-CM

## 2018-07-09 DIAGNOSIS — I89.0 CHRONIC ACQUIRED LYMPHEDEMA: ICD-10-CM

## 2018-07-09 PROCEDURE — 29581 APPL MULTLAYER CMPRN SYS LEG: CPT | Performed by: SPECIALIST

## 2018-07-09 PROCEDURE — 97597 DBRDMT OPN WND 1ST 20 CM/<: CPT | Performed by: SPECIALIST

## 2018-07-09 RX ORDER — LIDOCAINE HYDROCHLORIDE 40 MG/ML
2.5 SOLUTION TOPICAL ONCE
Status: COMPLETED | OUTPATIENT
Start: 2018-07-09 | End: 2018-07-09

## 2018-07-09 RX ADMIN — LIDOCAINE HYDROCHLORIDE 2.5 ML: 40 SOLUTION TOPICAL at 09:02

## 2018-07-09 NOTE — PROGRESS NOTES
Multilayer Compression Wrap   (Not Unna) Below the Knee    NAME:  Carrie Avalos  YOB: 1965  MEDICAL RECORD NUMBER:  [id number]  DATE:  7/9/2018       [x] Removed old Multilayer wrap if present and washed leg with mild soap/water.  [x] Applied moisturizing agent to dry skin as needed.  [x] Applied primary and secondary dressing as ordered     [x] Applied multilayered dressing below the knee to Right lower leg(s)  (Profore 4 Layer Wrap) per 's instructions.  [x] Instructed patient/caregiver not to remove dressing and to keep it clean and dry.  [x] Instructed patient/caregiver on complications to report to provider, such as pain, numbness in toes, heavy drainage, and slippage of dressing.  [x] Instructed patient on purpose of compression dressing and on activity and exercise recommendations.        Applied per   Guidelines    Electronically signed by Arnaldo Hagen RN on 7/9/2018 at 9:33 AM
your physician. Return Appointment:  [] Wound and dressing supply provider:   [] ECF or Home Healthcare:  [x] Nurse visit: May come in Friday for dressing change   [x] Return Appointment: With Dr Dominik Bautista  in  [facility])    [] Ordered tests:  [] Blood work [] vascular test(arterial/venous)   [] x-ray    Referrals: (see attached referral)    [] Weight Management [] Diabetes Education [] Vascular Surgery      [] Endocrinology  [] Nutrition Counseling  [] Lymphedema Therapy                  [] Plastic & Rescontruction Surgery    Your nurse  is:  Raymundo Sotelo     Electronically signed by Félix Wilburn RN on 7/9/2018 at [address]: Should you experience any significant changes in your wound(s) or have questions about your wound care, please contact the [address] [place] [address] at [phone] Monday-Friday from 8:00 am - 4:30 pm except for Wednesdays which hours are from 8:00 am - 2:00 pm.   If you need help with your wound outside these hours and cannot wait until we are again available, contact your PCP or go to the hospital emergency room. PLEASE NOTE: IF YOU ARE UNABLE TO OBTAIN WOUND SUPPLIES, CONTINUE TO USE THE SUPPLIES YOU HAVE AVAILABLE UNTIL YOU ARE ABLE TO REACH US. IT IS MOST IMPORTANT TO KEEP THE WOUND COVERED AT ALL TIMES. Physician orders by:     [] Dr Sangita Khan [] Dr Riky Lui    [] Dr Rosa Maria Moss     [] Dr Umm Cordero   [x] Dr Jae Santiago  [] Dr Nolan Milner  [] Dr Lesly Mendez       Physician Signature:__________________________________        The information contained in the After Visit Summary has been reviewed with me, the patient and/or responsible adult, by my health care provider(s). I had the opportunity to ask questions regarding this information.   I have elected to receive;      [] Patient unable to sign Discharge Instructions given to ECF/Transportation/POA          Electronically signed by Veena Jackson MD on 7/9/2018 at

## 2018-07-16 ENCOUNTER — HOSPITAL ENCOUNTER (OUTPATIENT)
Dept: WOUND CARE | Age: 53
Discharge: HOME | End: 2018-07-16
Payer: MEDICAID

## 2018-07-16 VITALS
SYSTOLIC BLOOD PRESSURE: 143 MMHG | DIASTOLIC BLOOD PRESSURE: 89 MMHG | TEMPERATURE: 98.2 F | HEART RATE: 81 BPM | RESPIRATION RATE: 18 BRPM

## 2018-07-16 DIAGNOSIS — I87.311 IDIOPATHIC CHRONIC VENOUS HYPERTENSION OF RIGHT LOWER EXTREMITY WITH ULCER (HCC): ICD-10-CM

## 2018-07-16 DIAGNOSIS — I89.0 CHRONIC ACQUIRED LYMPHEDEMA: Primary | ICD-10-CM

## 2018-07-16 DIAGNOSIS — Z79.01 CHRONIC ANTICOAGULATION: ICD-10-CM

## 2018-07-16 DIAGNOSIS — L97.919 IDIOPATHIC CHRONIC VENOUS HYPERTENSION OF RIGHT LOWER EXTREMITY WITH ULCER (HCC): ICD-10-CM

## 2018-07-16 PROCEDURE — 29581 APPL MULTLAYER CMPRN SYS LEG: CPT | Performed by: SPECIALIST

## 2018-07-16 PROCEDURE — 29581 APPL MULTLAYER CMPRN SYS LEG: CPT

## 2018-07-16 PROCEDURE — 97597 DBRDMT OPN WND 1ST 20 CM/<: CPT

## 2018-07-16 PROCEDURE — 29584 APPL MLTLAY CMPRN SYS UP ARM: CPT

## 2018-07-16 PROCEDURE — 97597 DBRDMT OPN WND 1ST 20 CM/<: CPT | Performed by: SPECIALIST

## 2018-07-16 PROCEDURE — 11042 DBRDMT SUBQ TIS 1ST 20SQCM/<: CPT

## 2018-07-16 RX ORDER — LIDOCAINE HYDROCHLORIDE 40 MG/ML
2.5 SOLUTION TOPICAL ONCE
Status: DISCONTINUED | OUTPATIENT
Start: 2018-07-16 | End: 2018-07-17 | Stop reason: HOSPADM

## 2018-07-16 ASSESSMENT — PAIN DESCRIPTION - ONSET: ONSET: GRADUAL

## 2018-07-16 ASSESSMENT — PAIN DESCRIPTION - LOCATION: LOCATION: LEG

## 2018-07-16 ASSESSMENT — PAIN SCALES - GENERAL: PAINLEVEL_OUTOF10: 1

## 2018-07-16 ASSESSMENT — PAIN DESCRIPTION - PAIN TYPE: TYPE: ACUTE PAIN

## 2018-07-16 ASSESSMENT — PAIN DESCRIPTION - ORIENTATION: ORIENTATION: RIGHT

## 2018-07-16 ASSESSMENT — PAIN DESCRIPTION - PROGRESSION: CLINICAL_PROGRESSION: NOT CHANGED

## 2018-07-16 ASSESSMENT — PAIN DESCRIPTION - DESCRIPTORS: DESCRIPTORS: BURNING

## 2018-07-16 ASSESSMENT — PAIN DESCRIPTION - FREQUENCY: FREQUENCY: CONTINUOUS

## 2018-07-16 NOTE — PROGRESS NOTES
[facility]  Progress Note and Procedure Note      Benjamin Moseley  MEDICAL RECORD NUMBER:  [id number]  AGE: 48 y.o. GENDER: male  : 1965  EPISODE DATE:  2018    Subjective:     Chief Complaint   Patient presents with    Wound Check     right lower leg         HISTORY of PRESENT ILLNESS HPI     Benjamin Moseley is a 48 y.o. male who presents today for wound/ulcer evaluation. 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 He underwent unsuccessfull dilatation of IVC at the . He has now been grafted with elevation and bed rest and lymphedema pumps with near complete healing. However, he has gone back to work against medical advise causing breakdown. Pain Assessment:  Wound/Ulcer Pain Timing/Severity: none  Quality of pain: N/A  Severity:  0 / 10   Modifying Factors: None  Associated Signs/Symptoms: edema    Ulcer Identification:  Ulcer Type: venous  Contributing Factors: edema, venous stasis, lymphedema, obesity and anticoagulation therapy    Objective:      BP (!) 143/89   Pulse 81   Temp 98.2 °F (36.8 °C) (Oral)   Resp 18     Wt Readings from Last 3 Encounters:   18 242 lb 8.1 oz (110 kg)   18 238 lb 1.6 oz (108 kg)   18 238 lb (108 kg)       PHYSICAL EXAM    Extremities: 2+ pitting edema with granulating wound posterior right calf. Assessment:      No diagnosis found. Procedure Note  Indications:  Based on my examination of this patient's wound(s) today, sharp excision is required to promote healing and evaluate the extent healing. Performed by: Barb Hernadez MD    Consent obtained? Yes    Time out taken: Yes    Pain Control: Anesthetic: 4% Lidocaine Liquid Topical     Debridement:Excisional Debridement    Using curette the wound was sharply debrided    down through and including the removal of  epidermis and dermis.     Devitalized Tissue Debrided:  fibrin      Pre Debridement Measurements:  Are located in the Wound Documentation Flow Sheet   Wound #: 6     Post  Debridement Measurements:  Wound 03/06/17 Leg Right; Lower; Anterior;Distal #1  venous/lymphedema (stage 3)  (Active)   Wound Image   6/25/2018  8:43 AM   Wound Type Wound 7/16/2018  8:17 AM   Wound Venous 7/16/2018  8:17 AM   Dressing Status Old drainage 4/6/2018 11:32 AM   Dressing/Treatment Other (Comment) 7/6/2018  3:19 PM   Wound Cleansed Rinsed/Irrigated with saline 7/16/2018  8:17 AM   Dressing Change Due 02/16/18 2/12/2018  9:57 AM   Wound Length (cm) 0.9 cm 7/16/2018  8:17 AM   Wound Width (cm) 0.9 cm 7/16/2018  8:17 AM   Wound Depth (cm)  0.1 7/16/2018  8:17 AM   Calculated Wound Size (cm^2) (l*w) 0.81 cm^2 7/16/2018  8:17 AM   Change in Wound Size % (l*w) 96.87 7/16/2018  8:17 AM   Distance Tunneling (cm) 0 cm 7/16/2018  8:17 AM   Tunneling Position ___ O'Clock 0 5/14/2018  9:56 AM   Undermining Starts ___ O'Clock 0 5/7/2018  9:43 AM   Undermining Ends___ O'Clock 0 5/7/2018  9:43 AM   Undermining Maxium Distance (cm) 0 7/16/2018  8:17 AM   Wound Assessment Yellow;Pink 7/16/2018  8:17 AM   Drainage Amount Scant 7/16/2018  8:17 AM   Drainage Description Serosanguinous 7/16/2018  8:17 AM   Odor None 7/16/2018  8:17 AM   Margins Defined edges 7/16/2018  8:17 AM   Kiah-wound Assessment Dry;Pink 7/16/2018  8:17 AM   Non-staged Wound Description Full thickness 7/16/2018  8:17 AM   [place]%Wound Bed 70 7/16/2018  8:17 AM   Red%Wound Bed 0 7/16/2018  8:17 AM   Yellow%Wound Bed 30 7/16/2018  8:17 AM   Black%Wound Bed 0 4/20/2018 10:54 AM   Purple%Wound Bed 0 5/14/2018  9:56 AM   Other%Wound Bed 0 5/4/2018  9:41 AM   Op First Treatment Date 03/06/17 6/4/2018  9:42 AM   Number of days: 496       Wound 05/14/18 #5 right lower medial leg venous; lymphedema ( had since 1/2016) (Active)   Wound Image   6/25/2018  8:43 AM   Wound Type Wound 7/16/2018  8:17 AM   Wound Venous 7/16/2018  8:17 AM   Dressing/Treatment Other (Comment) 7/16/2018 9:10 AM   Wound Cleansed Rinsed/Irrigated with saline 7/16/2018  8:17 AM   Wound Length (cm) 0.7 cm 7/16/2018  8:17 AM   Wound Width (cm) 0.9 cm 7/16/2018  8:17 AM   Wound Depth (cm)  0.1 7/16/2018  8:17 AM   Calculated Wound Size (cm^2) (l*w) 0.63 cm^2 7/16/2018  8:17 AM   Change in Wound Size % (l*w) 98.41 7/16/2018  8:17 AM   Distance Tunneling (cm) 0 cm 7/16/2018  8:17 AM   Undermining Maxium Distance (cm) 0 7/16/2018  8:17 AM   Wound Assessment Red 7/16/2018  8:17 AM   Drainage Amount Small 7/16/2018  8:17 AM   Drainage Description Serosanguinous 7/16/2018  8:17 AM   Odor None 7/16/2018  8:17 AM   Margins Defined edges; Undefined edges 7/16/2018  8:17 AM   Kiah-wound Assessment Pink 7/16/2018  8:17 AM   Non-staged Wound Description Full thickness 7/16/2018  8:17 AM   [place]%Wound Bed 0 7/16/2018  8:17 AM   Red%Wound Bed 100 7/16/2018  8:17 AM   Yellow%Wound Bed 0 7/16/2018  8:17 AM   Other%Wound Bed 0 7/2/2018  8:49 AM   Op First Treatment Date 05/14/18 6/4/2018  9:36 AM   Number of days: 62       Wound 05/14/18 #6 right lower leg posterior venous/ lymphedema  (Active)   Wound Image   6/25/2018  8:43 AM   Wound Type Wound 7/16/2018  8:17 AM   Wound Venous 7/16/2018  8:17 AM   Dressing/Treatment Other (Comment) 7/16/2018  9:10 AM   Wound Cleansed Rinsed/Irrigated with saline 7/16/2018  8:17 AM   Wound Length (cm) 2.2 cm 7/16/2018  9:11 AM   Wound Width (cm) 5.6 cm 7/16/2018  9:11 AM   Wound Depth (cm)  0.3 7/16/2018  9:11 AM   Calculated Wound Size (cm^2) (l*w) 12.32 cm^2 7/16/2018  9:11 AM   Change in Wound Size % (l*w) 61.8 7/16/2018  9:11 AM   Distance Tunneling (cm) 0 cm 7/16/2018  8:17 AM   Undermining Maxium Distance (cm) 0 7/16/2018  8:17 AM   Wound Assessment Swelling;Yellow; Red 7/16/2018  8:17 AM   Drainage Amount Small 7/16/2018  8:17 AM   Drainage Description Serosanguinous 7/16/2018  8:17 AM   Odor None 7/16/2018  8:17 AM   Margins Undefined edges 7/16/2018  8:17 AM   Kiah-wound Assessment Yellow-brown;Pink 7/16/2018  8:17 AM   Non-staged Wound Description Full thickness 7/16/2018  8:17 AM   [place]%Wound Bed 0 7/16/2018  8:17 AM   Red%Wound Bed 70 7/16/2018  8:17 AM   Yellow%Wound Bed 30 7/16/2018  8:17 AM   Op First Treatment Date 05/14/18 6/4/2018  9:36 AM   Number of days: 62       Percent of Wound Debrided: 100%    Total Surface Area Debrided:  12 sq cm    Diabetic/Pressure/Non Pressure Ulcers only:  Ulcer: Non-Pressure ulcer, limited to breakdown of skin    Bleeding: Minimal    Hemostasis Achieved: by pressure    Procedural Pain: 0  / 10     Post Procedural Pain: 0 / 10     Response to treatment:  Well tolerated by patient. Plan:     Treatment Note: Please see attached Discharge Instructions. These instructions were given and singed by the patient or POA    New Medication(s) at this visit:   New Prescriptions    No medications on file       In my professional opinion this patient would benefit from HBO Therapy: No       Patient is to return to wound care center in:  1 week(s)    Discharge [facility] Physician Orders and Discharge Instructions  [address] [place] [address]  Telephone: [phone]    NAME:  James Lui  YOB: 1965  MEDICAL RECORD NUMBER:  [id number]  DATE:  7/16/2018    Wash hands with soap and water prior to and after every dressing change. Wound Cleansing:   · Do not scrub or use excessive force. · With each dressing change, rinse wounds with 0.9% Saline. (May use wound wash or soft contact solution. Both can be purchased at a local drug store). · If unable to obtain saline, may use a gentle soap and water. · Keep wounds dry in the shower unless otherwise instructed by the physician. Topical Treatments:  Do not apply lotions, creams, or ointments to wound bed unless directed.           Dressings:           Wound Location: right lower in one place. Dietary:  Important dietary reminders:  1. Increase Protein intake (i.e. Lean meats, fish, eggs, legumes, and yogurt)  2. No added salt  3. If diabetic, follow a diabetic diet and check glucose prior to meals or as instructed by your physician. Dietary Supplements:  [] Ensure EnLive [] Papito [] 30ml ProMod/ProStat      Take supplements twice a day or as directed as followed:     Smoking:  Counseling given: Not Answered    Please talk with your Primary Health Care Provider about assistance to help stop smoking. Please read the additional information attached to these instructions if included. Return Appointment:  [] Wound and dressing supply provider:   [] ECF or Home Healthcare:  [x] Nurse visit:  This Friday  [x] Return Appointment: With  Dr Hanna Baker  in  [address] [place] [address])    [] Ordered tests:  [] Blood work [] vascular test(arterial/venous)   [] x-ray    Referrals: (see attached referral)    [] Weight Management [] Diabetes Education [] Vascular Surgery      [] Endocrinology  [] Nutrition Counseling  [] Lymphedema Therapy                  [] Plastic & Rescontruction Surgery    Your nurse  is:  Leatha Esparza     Electronically signed by Sandee Ellis RN on 7/16/2018 at 8:35 AM     [facility] Information: Should you experience any significant changes in your wound(s) or have questions about your wound care, please contact the [address] [place] [address] at [phone] Monday-Friday from 8:00 am - 4:30 pm except for Wednesdays which hours are from 8:00 am - 2:00 pm.   If you need help with your wound outside these hours and cannot wait until we are again available, contact your PCP or go to the hospital emergency room. PLEASE NOTE: IF YOU ARE UNABLE TO OBTAIN WOUND SUPPLIES, CONTINUE TO USE THE SUPPLIES YOU HAVE AVAILABLE UNTIL YOU ARE ABLE TO REACH US. IT IS MOST IMPORTANT TO KEEP THE WOUND COVERED AT ALL TIMES.       Physician orders by:     [] Dr Alexsandra Castro [] Dr Shana Will Harsha Hernández    [] Dr Radha Thayer     [] Dr Karl Morris   [x] Dr Libertad Sepulveda  [] Dr Cristofer Jimenez  [] Dr Sybil Sandifer       Physician Signature:__________________________________        The information contained in the After Visit Summary has been reviewed with me, the patient and/or responsible adult, by my health care provider(s). I had the opportunity to ask questions regarding this information.   I have elected to receive;      [] Patient unable to sign Discharge Instructions given to ECF/Transportation/POA        Electronically signed by Kristina Garcia MD on 7/16/2018 at 9:29 AM

## 2018-07-16 NOTE — PROGRESS NOTES
Multilayer Compression Wrap   (Not Unna) Below the Knee    NAME:  Carrie Avalos  YOB: 1965  MEDICAL RECORD NUMBER:  [id number]  DATE:  7/16/2018       [x] Removed old Multilayer wrap if present and washed leg with mild soap/water.  [x] Applied moisturizing agent to dry skin as needed.  [x] Applied primary and secondary dressing as ordered     [x] Applied multilayered dressing below the knee to Right lower leg(s)  (Profore 4 Layer Wrap) per 's instructions.  [x] Instructed patient/caregiver not to remove dressing and to keep it clean and dry.  [x] Instructed patient/caregiver on complications to report to provider, such as pain, numbness in toes, heavy drainage, and slippage of dressing.  [x] Instructed patient on purpose of compression dressing and on activity and exercise recommendations.        Applied per   Guidelines    Electronically signed by Arnaldo Hagen RN on 7/16/2018 at 9:09 AM

## 2018-07-20 ENCOUNTER — HOSPITAL ENCOUNTER (OUTPATIENT)
Dept: WOUND CARE | Age: 53
Discharge: HOME | End: 2018-07-20
Payer: MEDICAID

## 2018-07-20 VITALS
SYSTOLIC BLOOD PRESSURE: 142 MMHG | RESPIRATION RATE: 18 BRPM | TEMPERATURE: 98.1 F | HEART RATE: 76 BPM | DIASTOLIC BLOOD PRESSURE: 85 MMHG

## 2018-07-20 DIAGNOSIS — I10 ESSENTIAL HYPERTENSION: ICD-10-CM

## 2018-07-20 LAB
A/G RATIO: 1.6 (ref 1.1–2.2)
ALBUMIN SERPL-MCNC: 4.4 G/DL (ref 3.4–5)
ALP BLD-CCNC: 81 U/L (ref 40–129)
ALT SERPL-CCNC: 12 U/L (ref 10–40)
ANION GAP SERPL CALCULATED.3IONS-SCNC: 9 MMOL/L (ref 3–16)
AST SERPL-CCNC: 12 U/L (ref 15–37)
BILIRUB SERPL-MCNC: 0.4 MG/DL (ref 0–1)
BUN BLDV-MCNC: 17 MG/DL (ref 7–20)
CALCIUM SERPL-MCNC: 9.1 MG/DL (ref 8.3–10.6)
CHLORIDE BLD-SCNC: 110 MMOL/L (ref 99–110)
CO2: 24 MMOL/L (ref 21–32)
CREAT SERPL-MCNC: 1 MG/DL (ref 0.9–1.3)
GFR AFRICAN AMERICAN: >60
GFR NON-AFRICAN AMERICAN: >60
GLOBULIN: 2.7 G/DL
GLUCOSE BLD-MCNC: 94 MG/DL (ref 70–99)
POTASSIUM SERPL-SCNC: 5 MMOL/L (ref 3.5–5.1)
SODIUM BLD-SCNC: 143 MMOL/L (ref 136–145)
TOTAL PROTEIN: 7.1 G/DL (ref 6.4–8.2)

## 2018-07-20 PROCEDURE — 29581 APPL MULTLAYER CMPRN SYS LEG: CPT

## 2018-07-20 PROCEDURE — 6370000000 HC RX 637 (ALT 250 FOR IP): Performed by: SPECIALIST

## 2018-07-20 RX ORDER — LIDOCAINE HYDROCHLORIDE 40 MG/ML
2.5 SOLUTION TOPICAL ONCE
Status: DISCONTINUED | OUTPATIENT
Start: 2018-07-20 | End: 2018-07-21 | Stop reason: HOSPADM

## 2018-07-20 ASSESSMENT — PAIN DESCRIPTION - FREQUENCY: FREQUENCY: CONTINUOUS

## 2018-07-20 ASSESSMENT — PAIN SCALES - GENERAL: PAINLEVEL_OUTOF10: 1

## 2018-07-20 ASSESSMENT — PAIN DESCRIPTION - DESCRIPTORS: DESCRIPTORS: BURNING

## 2018-07-20 ASSESSMENT — PAIN DESCRIPTION - ORIENTATION: ORIENTATION: RIGHT

## 2018-07-20 ASSESSMENT — PAIN DESCRIPTION - LOCATION: LOCATION: LEG

## 2018-07-20 ASSESSMENT — PAIN DESCRIPTION - ONSET: ONSET: GRADUAL

## 2018-07-20 ASSESSMENT — PAIN DESCRIPTION - PROGRESSION: CLINICAL_PROGRESSION: NOT CHANGED

## 2018-07-20 NOTE — PROGRESS NOTES
Multilayer Compression Wrap   (Not Unna) Below the Knee    NAME:  Raul Farnsworth  YOB: 1965  MEDICAL RECORD NUMBER:  [id number]  DATE:  7/20/2018       [x] Removed old Multilayer wrap if present and washed leg with mild soap/water.  [x] Applied moisturizing agent to dry skin as needed.  [x] Applied primary and secondary dressing as ordered     [x] Applied multilayered dressing below the knee to Right lower leg(s)  (Profore 4 Layer Wrap) per 's instructions.  [x] Instructed patient/caregiver not to remove dressing and to keep it clean and dry.  [x] Instructed patient/caregiver on complications to report to provider, such as pain, numbness in toes, heavy drainage, and slippage of dressing.  [x] Instructed patient on purpose of compression dressing and on activity and exercise recommendations.        Applied per   Guidelines    Electronically signed by Adalberto Bhardwaj RN on 7/20/2018 at 8:33 AM

## 2018-07-25 ENCOUNTER — HOSPITAL ENCOUNTER (OUTPATIENT)
Dept: WOUND CARE | Age: 53
Discharge: HOME | End: 2018-07-25
Payer: MEDICAID

## 2018-07-25 VITALS
HEART RATE: 68 BPM | DIASTOLIC BLOOD PRESSURE: 80 MMHG | TEMPERATURE: 97.9 F | SYSTOLIC BLOOD PRESSURE: 131 MMHG | RESPIRATION RATE: 18 BRPM

## 2018-07-25 DIAGNOSIS — R60.0 LOCALIZED EDEMA: ICD-10-CM

## 2018-07-25 DIAGNOSIS — Z79.01 CHRONIC ANTICOAGULATION: ICD-10-CM

## 2018-07-25 DIAGNOSIS — I89.0 CHRONIC ACQUIRED LYMPHEDEMA: Primary | ICD-10-CM

## 2018-07-25 DIAGNOSIS — I87.311 IDIOPATHIC CHRONIC VENOUS HYPERTENSION OF RIGHT LOWER EXTREMITY WITH ULCER (HCC): ICD-10-CM

## 2018-07-25 DIAGNOSIS — L97.919 IDIOPATHIC CHRONIC VENOUS HYPERTENSION OF RIGHT LOWER EXTREMITY WITH ULCER (HCC): ICD-10-CM

## 2018-07-25 DIAGNOSIS — I82.220 THROMBOSIS OF INFERIOR VENA CAVA (HCC): ICD-10-CM

## 2018-07-25 PROCEDURE — 97597 DBRDMT OPN WND 1ST 20 CM/<: CPT

## 2018-07-25 PROCEDURE — 29581 APPL MULTLAYER CMPRN SYS LEG: CPT

## 2018-07-25 PROCEDURE — 97597 DBRDMT OPN WND 1ST 20 CM/<: CPT | Performed by: SPECIALIST

## 2018-07-25 PROCEDURE — 29581 APPL MULTLAYER CMPRN SYS LEG: CPT | Performed by: SPECIALIST

## 2018-07-25 PROCEDURE — 15271 SKIN SUB GRAFT TRNK/ARM/LEG: CPT

## 2018-07-25 PROCEDURE — 6370000000 HC RX 637 (ALT 250 FOR IP): Performed by: SPECIALIST

## 2018-07-25 PROCEDURE — 15271 SKIN SUB GRAFT TRNK/ARM/LEG: CPT | Performed by: SPECIALIST

## 2018-07-25 RX ORDER — LIDOCAINE HYDROCHLORIDE 40 MG/ML
2.5 SOLUTION TOPICAL ONCE
Status: COMPLETED | OUTPATIENT
Start: 2018-07-25 | End: 2018-07-25

## 2018-07-25 RX ADMIN — LIDOCAINE HYDROCHLORIDE 2.5 ML: 40 SOLUTION TOPICAL at 09:41

## 2018-07-25 ASSESSMENT — PAIN SCALES - GENERAL: PAINLEVEL_OUTOF10: 0

## 2018-07-25 NOTE — PROGRESS NOTES
Multilayer Compression Wrap   (Not Unna) Below the Knee    NAME:  Benjamin Moseley  YOB: 1965  MEDICAL RECORD NUMBER:  [id number]  DATE:  7/25/2018       [x] Removed old Multilayer wrap if indicated and wash leg with mild soap/water.  [x] Applied moisturizing agent to dry skin as needed.  [x] Applied primary and secondary dressing as ordered    [x] Applied multilayered dressing below the knee to Right lower leg(s).  [x] Instructed patient/caregiver not to remove dressing and to keep it clean and dry.  [x] Instructed patient/caregiver on complications to report to provider, such as pain, numbness in toes, heavy drainage, and slippage of dressing.  [x] Instructed patient on purpose of compression dressing and on activity and exercise recommendations.     Electronically signed by Jennifer Arnold RN on 7/25/2018 at 10:09 AM

## 2018-07-25 NOTE — PROGRESS NOTES
fibrin      Pre Debridement Measurements:  Are located in the Wound Documentation Flow Sheet   Wound #: 1 and 5     Procedure:  Skin Substitute Application    Performed by: Lukas Navas MD    Ulcer Type: venous    Consent obtained: Yes    Time out taken: Yes    Product Utilized:      Apligraf    [x] 44 sq/cm   [] 88 sq/cm    [] 132 sq/cm    Dermagraft    [] 38 sq/cm   [] 76 sq/cm    PuraPly AM    [] 1.6 sq/cm  [] 4 sq/cm  [] 8 sq/cm    [] 25 sq/cm   [] 54sq/cm     NuShield    [] 1.6 sq/cm   [] 6 sq/cm    [] 8 sq/cm   [] 12 sq/cm [] 16 sq/cm [] 24 sq/cm    Affinity    [] 2.25 sq/cm   [] 6.25 sq/cm            EpiFix    [] 14 mm disc (1.54 cm)  [] 18 mm disc  [] 4 sq/cm                                     [] 6 sq/cm    [] 8 sq/cm  [] 11 sq/cm     Epicord  [] 6 sq/cm        TheraSkin [] 13 sq/cm    [] 39 sq/cm                  Fenestrated: No    Mesher Utilized: No    Instrument(s) scissors and forceps    Skin Substitute was Applied to Ulcer Number(s):    Ulcer #:6      Wound 03/06/17 Leg Right; Lower; Anterior;Distal #1  venous/lymphedema (stage 3)  (Active)   Wound Image   7/25/2018  9:22 AM   Wound Type Wound 7/20/2018  8:14 AM   Wound Venous 7/25/2018  9:22 AM   Dressing Status Old drainage 4/6/2018 11:32 AM   Dressing/Treatment Other (Comment) 7/6/2018  3:19 PM   Wound Cleansed Rinsed/Irrigated with saline 7/25/2018  9:22 AM   Dressing Change Due 02/16/18 2/12/2018  9:57 AM   Wound Length (cm) 0.8 cm 7/25/2018  9:22 AM   Wound Width (cm) 0.8 cm 7/25/2018  9:22 AM   Wound Depth (cm)  0.1 7/25/2018  9:22 AM   Calculated Wound Size (cm^2) (l*w) 0.64 cm^2 7/25/2018  9:22 AM   Change in Wound Size % (l*w) 97.53 7/25/2018  9:22 AM   Distance Tunneling (cm) 0 cm 7/25/2018  9:22 AM   Tunneling Position ___ O'Clock 0 5/14/2018  9:56 AM   Undermining Starts ___ O'Clock 0 5/7/2018  9:43 AM   Undermining Ends___ O'Clock 0 5/7/2018  9:43 AM   Undermining Maxium Distance (cm) 0 7/25/2018  9:22 AM   Wound Assessment Yellow;Red;Slough 7/25/2018  9:22 AM   Drainage Amount Small 7/25/2018  9:22 AM   Drainage Description Serosanguinous 7/25/2018  9:22 AM   Odor None 7/25/2018  9:22 AM   Margins Defined edges 7/25/2018  9:22 AM   Kiah-wound Assessment Dry;Pink 7/25/2018  9:22 AM   Non-staged Wound Description Full thickness 7/25/2018  9:22 AM   [place]%Wound Bed 0 7/25/2018  9:22 AM   Red%Wound Bed 80 7/25/2018  9:22 AM   Yellow%Wound Bed 20 7/25/2018  9:22 AM   Black%Wound Bed 0 4/20/2018 10:54 AM   Purple%Wound Bed 0 5/14/2018  9:56 AM   Other%Wound Bed 0 5/4/2018  9:41 AM   Op First Treatment Date 03/06/17 6/4/2018  9:42 AM   Number of days: 505       Wound 05/14/18 #5 right lower medial leg venous; lymphedema ( had since 1/2016) (Active)   Wound Image   7/25/2018  9:22 AM   Wound Type Wound 7/25/2018  9:22 AM   Wound Venous 7/20/2018  8:14 AM   Dressing/Treatment Other (Comment) 7/20/2018  8:14 AM   Wound Cleansed Rinsed/Irrigated with saline 7/25/2018  9:22 AM   Wound Length (cm) 1.5 cm 7/25/2018  9:22 AM   Wound Width (cm) 1.8 cm 7/25/2018  9:22 AM   Wound Depth (cm)  0.1 7/25/2018  9:22 AM   Calculated Wound Size (cm^2) (l*w) 2.7 cm^2 7/25/2018  9:22 AM   Change in Wound Size % (l*w) 93.18 7/25/2018  9:22 AM   Distance Tunneling (cm) 0 cm 7/25/2018  9:22 AM   Undermining Maxium Distance (cm) 0 7/25/2018  9:22 AM   Wound Assessment Red;Slough;Gray 7/25/2018  9:22 AM   Drainage Amount Small 7/25/2018  9:22 AM   Drainage Description Serosanguinous 7/25/2018  9:22 AM   Odor None 7/25/2018  9:22 AM   Margins Defined edges 7/25/2018  9:22 AM   Kiah-wound Assessment Pink 7/25/2018  9:22 AM   Non-staged Wound Description Full thickness 7/25/2018  9:22 AM   [place]%Wound Bed 0 7/16/2018  8:17 AM   Red%Wound Bed 20 7/25/2018  9:22 AM   Yellow%Wound Bed 5 7/20/2018  8:14 AM   Other%Wound Bed 80 - gray 7/25/2018  9:22 AM   Op First Treatment Date 05/14/18 6/4/2018  9:36 AM   Number of days: 72       Wound 05/14/18 #6 right lower leg posterior Change in Wound Size % (l*w) 93.18 7/25/2018  9:22 AM   Distance Tunneling (cm) 0 cm 7/25/2018  9:22 AM   Undermining Maxium Distance (cm) 0 7/25/2018  9:22 AM   Wound Assessment Red;Slough;Gray 7/25/2018  9:22 AM   Drainage Amount Small 7/25/2018  9:22 AM   Drainage Description Serosanguinous 7/25/2018  9:22 AM   Odor None 7/25/2018  9:22 AM   Margins Defined edges 7/25/2018  9:22 AM   Kiah-wound Assessment Pink 7/25/2018  9:22 AM   Non-staged Wound Description Full thickness 7/25/2018  9:22 AM   [place]%Wound Bed 0 7/16/2018  8:17 AM   Red%Wound Bed 20 7/25/2018  9:22 AM   Yellow%Wound Bed 5 7/20/2018  8:14 AM   Other%Wound Bed 80 - gray 7/25/2018  9:22 AM   Op First Treatment Date 05/14/18 6/4/2018  9:36 AM   Number of days: 72       Wound 05/14/18 #6 right lower leg posterior venous/ lymphedema  (Active)   Wound Image   7/25/2018  9:22 AM   Wound Type Wound 7/20/2018  8:14 AM   Wound Venous 7/25/2018  9:22 AM   Dressing/Treatment Other (Comment) 7/20/2018  8:14 AM   Wound Cleansed Rinsed/Irrigated with saline 7/25/2018  9:22 AM   Wound Length (cm) 4.3 cm 7/25/2018  9:22 AM   Wound Width (cm) 2.4 cm 7/25/2018  9:22 AM   Wound Depth (cm)  0.1 7/25/2018  9:22 AM   Calculated Wound Size (cm^2) (l*w) 10.32 cm^2 7/25/2018  9:22 AM   Change in Wound Size % (l*w) 68 7/25/2018  9:22 AM   Distance Tunneling (cm) 0 cm 7/25/2018  9:22 AM   Undermining Maxium Distance (cm) 0 7/25/2018  9:22 AM   Wound Assessment Bleeding;Red;Slough; Yellow 7/25/2018  9:22 AM   Drainage Amount Moderate 7/25/2018  9:22 AM   Drainage Description Serosanguinous 7/25/2018  9:22 AM   Odor None 7/25/2018  9:22 AM   Margins Defined edges 7/25/2018  9:22 AM   Kiah-wound Assessment Pink;Dry 7/25/2018  9:22 AM   Non-staged Wound Description Full thickness 7/25/2018  9:22 AM   [place]%Wound Bed 0 7/16/2018  8:17 AM   Red%Wound Bed 80 7/25/2018  9:22 AM   Yellow%Wound Bed 20 7/25/2018  9:22 AM   Op First Treatment Date 05/14/18 6/4/2018  9:36 AM   Number of Apply thin film of moisture barrier ointment to skin immediately around wound. [] Other:      Dressings:           Wound Location: all other right lower leg wounds     [x] Apply Primary Dressing to wound:       [] Foam/Foam with Border(i.e Mepilex) [] Non-adherent (i.e.Mepitel)   [] Alginate with Silver (i.e. Silvercel)   [] Alginate   [x] Collagen (i.e. Puracol) [] Collagen with Silver(i.e. Candice)     [] Hydrocolloid  [] Hydrafera Blue moistened with saline   [] MediHoney Paste/Gel [] Hydrogel   [] Endoform      [] Santyl covered with gauze moisten with saline    [] Bactroban/Mupirocin [] Polysporin/Neosporin  [] Other:      Pack wound loosely with: [] Iodoform   [] Plain Packing  [] Saline \"wet to dry\"      [x] Cover and Secure with:     [x] Dry Gauze [] ABD [] Stretch bandage roll [] Kerlix   [] Coban [] Ace Wrap [] Cover Roll Tape [] Paper Tape   [] Other:    Avoid contact of tape with skin if possible.     [x] Change dressing: [] Daily    [] Every Other Day [] Three times per week   [] Once a week [x] Do Not Change Dressing   [] Other:    Dressings:           Wound Location: right lower leg posterior wound     [x] Apply Primary Dressing to wound:       [] Foam/Foam with Border(i.e Mepilex) [] Non-adherent (i.e.Mepitel)   [] Alginate with Silver(i.e. Silvercel) [] Alginate   [] Collagen(i.e. Puracol) [] Collagen with Silver(i.e. Candice)   [] Hydrocolloid  [] Hydrafera Blue moistened with saline   [] MediHoney Paste/Gel [] Hydrogel      [] Santyl covered with gauze moisten with saline    [] Bactroban/Mupirocin [] Polysporin/Neosporin  [x] Other: appligraf 3rd application applied, covered with mepitel, secured with steri strips, covered with gauze     Pack wound loosely with: [] Iodoform   [] Plain Packing  []Saline \"wet to dry\"     [x] Cover and Secure with:     [x] Gauze [] ABD [] Stretch bandage roll [] Kerlix   [] Coban [] Ace Wrap [] Medipore Tape [] Paper Tape   [] Other:    Avoid contact of tape with skin

## 2018-07-25 NOTE — PROGRESS NOTES
Apligraf Treatment Note    NAME:  Karlos Denies  YOB: 1965  MEDICAL RECORD NUMBER:  [id number]  DATE:  7/25/2018    Goal: Patient will receive safe and proper application of skin substitute. Patient will comply with caring for dressing, offloading and reporting complications. [x]Expiration date and pH of Apligraf checked immediately prior to use. [x] Package intact prior to use and no damage noted. [x] Transport temperature controlled and acceptable. Apligraf was removed from protective sterile packaging by physician and applied to prepared wound bed. Apligraf was placed dermal side down onto the wound bed. Apligraf was applied to right posterior lower leg wound and affixed with steri-strips by the physician. [x] Apligraf was covered with non-adherent ulcer dressing. [x] Applied gauze over non-adherent. [x] Applied dry gauze and/or roll gauze. [x] Coban or ace wrap was applied to secure graft and decrease edema. Patient/caregiver was instructed not to remove dressing and to keep it clean         and dry. Pt/family/caregiver was instructed on signs and symptoms of complications       to report such as draining through dressing, dressing falling down/slipping,            getting wet, or severe pain or tingling in toes   Pt/family/caregiver was instructed on need for offloading and elevation of               affected extremity and on use of prescribed offloading device. Apligraf may be applied a total of 5 times per wound over a 12 week period. Date of first application of Apligraf for this current wound is June 18, 2018.        Guidelines followed    Electronically signed by Angel Enriquez RN on 7/25/2018 at 10:00 AM

## 2018-07-30 ENCOUNTER — HOSPITAL ENCOUNTER (OUTPATIENT)
Dept: WOUND CARE | Age: 53
Discharge: HOME | End: 2018-07-30
Payer: MEDICAID

## 2018-07-30 VITALS
HEART RATE: 75 BPM | RESPIRATION RATE: 18 BRPM | SYSTOLIC BLOOD PRESSURE: 148 MMHG | DIASTOLIC BLOOD PRESSURE: 80 MMHG | TEMPERATURE: 98.1 F

## 2018-07-30 DIAGNOSIS — Z79.01 CHRONIC ANTICOAGULATION: ICD-10-CM

## 2018-07-30 DIAGNOSIS — I82.423 THROMBOSIS OF BOTH ILIAC VEINS (HCC): ICD-10-CM

## 2018-07-30 DIAGNOSIS — L97.919 IDIOPATHIC CHRONIC VENOUS HYPERTENSION OF RIGHT LOWER EXTREMITY WITH ULCER (HCC): ICD-10-CM

## 2018-07-30 DIAGNOSIS — I87.311 IDIOPATHIC CHRONIC VENOUS HYPERTENSION OF RIGHT LOWER EXTREMITY WITH ULCER (HCC): ICD-10-CM

## 2018-07-30 DIAGNOSIS — I89.0 CHRONIC ACQUIRED LYMPHEDEMA: ICD-10-CM

## 2018-07-30 DIAGNOSIS — I82.220 THROMBOSIS OF INFERIOR VENA CAVA (HCC): Primary | ICD-10-CM

## 2018-07-30 PROCEDURE — 29581 APPL MULTLAYER CMPRN SYS LEG: CPT | Performed by: SPECIALIST

## 2018-07-30 PROCEDURE — 29581 APPL MULTLAYER CMPRN SYS LEG: CPT

## 2018-07-30 PROCEDURE — 99214 OFFICE O/P EST MOD 30 MIN: CPT | Performed by: SPECIALIST

## 2018-07-30 NOTE — PROGRESS NOTES
Multilayer Compression Wrap   (Not Unna) Below the Knee    NAME:  Richar Vanegas  YOB: 1965  MEDICAL RECORD NUMBER:  [id number]  DATE:  7/30/2018       [x] Removed old Multilayer wrap if indicated and wash leg with mild soap/water.  [x] Applied moisturizing agent to dry skin as needed.  [x] Applied primary and secondary dressing as ordered    [x] Applied multilayered dressing below the knee to Right lower leg(s). profore 4 layer   [x] Instructed patient/caregiver not to remove dressing and to keep it clean and dry.  [x] Instructed patient/caregiver on complications to report to provider, such as pain, numbness in toes, heavy drainage, and slippage of dressing.  [x] Instructed patient on purpose of compression dressing and on activity and exercise recommendations.     Electronically signed by Jaden Bey RN on 7/30/2018 at 8:52 AM
(Comment) 7/20/2018  8:14 AM   Wound Cleansed Rinsed/Irrigated with saline 7/30/2018  8:31 AM   Wound Length (cm) 4.3 cm 7/30/2018  8:31 AM   Wound Width (cm) 2.4 cm 7/30/2018  8:31 AM   Wound Depth (cm)  0.1 7/30/2018  8:31 AM   Calculated Wound Size (cm^2) (l*w) 10.32 cm^2 7/30/2018  8:31 AM   Change in Wound Size % (l*w) 68 7/30/2018  8:31 AM   Distance Tunneling (cm) 0 cm 7/25/2018  9:22 AM   Undermining Maxium Distance (cm) 0 7/25/2018  9:22 AM   Wound Assessment Red;Yellow 7/30/2018  8:31 AM   Drainage Amount Moderate 7/30/2018  8:31 AM   Drainage Description Brown;Green;Yellow 7/30/2018  8:31 AM   Odor Mild 7/30/2018  8:31 AM   Margins Defined edges 7/30/2018  8:31 AM   Kiah-wound Assessment Pink 7/30/2018  8:31 AM   Non-staged Wound Description Full thickness 7/30/2018  8:31 AM   [place]%Wound Bed 0 7/16/2018  8:17 AM   Red%Wound Bed 80 7/30/2018  8:31 AM   Yellow%Wound Bed 20 7/30/2018  8:31 AM   Op First Treatment Date 05/14/18 6/4/2018  9:36 AM   Number of days: 76         Plan:     Treatment Note please see attached Discharge Instructions    In my professional opinion this patient would benefit from HBO Therapy: No       Patient is to return to wound care center in:  4 days    Written patient dismissal instructions given to patient and signed by patient or POA.                  Electronically signed by Veena Jackson MD on 7/30/2018 at 8:46 AM

## 2018-08-03 ENCOUNTER — HOSPITAL ENCOUNTER (OUTPATIENT)
Dept: WOUND CARE | Age: 53
Discharge: HOME | End: 2018-08-03
Payer: MEDICAID

## 2018-08-03 VITALS
SYSTOLIC BLOOD PRESSURE: 127 MMHG | RESPIRATION RATE: 18 BRPM | TEMPERATURE: 98.3 F | HEART RATE: 70 BPM | DIASTOLIC BLOOD PRESSURE: 76 MMHG

## 2018-08-03 DIAGNOSIS — I82.220 THROMBOSIS OF INFERIOR VENA CAVA (HCC): ICD-10-CM

## 2018-08-03 DIAGNOSIS — L97.919 IDIOPATHIC CHRONIC VENOUS HYPERTENSION OF RIGHT LOWER EXTREMITY WITH ULCER (HCC): Primary | ICD-10-CM

## 2018-08-03 DIAGNOSIS — Z79.01 CHRONIC ANTICOAGULATION: ICD-10-CM

## 2018-08-03 DIAGNOSIS — I89.0 CHRONIC ACQUIRED LYMPHEDEMA: ICD-10-CM

## 2018-08-03 DIAGNOSIS — R60.0 LOCALIZED EDEMA: ICD-10-CM

## 2018-08-03 DIAGNOSIS — I87.311 IDIOPATHIC CHRONIC VENOUS HYPERTENSION OF RIGHT LOWER EXTREMITY WITH ULCER (HCC): Primary | ICD-10-CM

## 2018-08-03 PROCEDURE — 15271 SKIN SUB GRAFT TRNK/ARM/LEG: CPT | Performed by: SPECIALIST

## 2018-08-03 PROCEDURE — 6370000000 HC RX 637 (ALT 250 FOR IP): Performed by: SPECIALIST

## 2018-08-03 PROCEDURE — 15271 SKIN SUB GRAFT TRNK/ARM/LEG: CPT

## 2018-08-03 PROCEDURE — 29581 APPL MULTLAYER CMPRN SYS LEG: CPT | Performed by: SPECIALIST

## 2018-08-03 RX ORDER — LIDOCAINE HYDROCHLORIDE 40 MG/ML
2.5 SOLUTION TOPICAL ONCE
Status: COMPLETED | OUTPATIENT
Start: 2018-08-03 | End: 2018-08-03

## 2018-08-03 RX ADMIN — LIDOCAINE HYDROCHLORIDE 2.5 ML: 40 SOLUTION TOPICAL at 10:17

## 2018-08-03 NOTE — PROGRESS NOTES
[facility]  Progress Note and Procedure Note      Justin Baeza  AGE: 48 y.o. GENDER: male  : 1965  EPISODE DATE:  8/3/2018      Subjective:     Chief Complaint   Patient presents with    Wound Check     f/u right lower leg wounds         HISTORY of PRESENT ILLNESS HPI     Benjamin Moseley is a 48 y.o. male who presents today for wound evaluation. 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 Seen at the [address] [place] [address] in the past at the  unsuccessfull dilatation of IVC. He has been grafted with compression and lymphedema pumps with near complete healing. However, he has gone back to work and is on his feet causing partial loss of the graft.   Wound Pain Timing/Severity: none  Quality of pain: N/A  Severity:  0 / 10   Modifying Factors: None  Associated Signs/Symptoms: edema    Wound Identification:  Wound Type: venous  Contributing Factors: edema, venous stasis, lymphedema, obesity and anticoagulation therapy        PAST MEDICAL HISTORY        Diagnosis Date    DVT (deep venous thrombosis) (HCC)     Pain and swelling of right lower leg        PAST SURGICAL HISTORY    Past Surgical History:   Procedure Laterality Date    BALLOON ANGIOPLASTY, ARTERY Right 2017    at [facility] Right        FAMILY HISTORY    Family History   Problem Relation Age of Onset    Diabetes Mother     Heart Attack Father        SOCIAL HISTORY    Social History   Substance Use Topics    Smoking status: Never Smoker    Smokeless tobacco: Never Used    Alcohol use No       ALLERGIES    No Known Allergies    MEDICATIONS    Current Outpatient Prescriptions on File Prior to Encounter   Medication Sig Dispense Refill    warfarin (COUMADIN) 5 MG tablet Please continue to take your home warfarin as prescribed  5mg dose  1 tablet 0    Handicap Placard MISC by Does not apply route Dx Lower loeg DVT chronic, bilateral. Effective 2/12/2018 until 2/12/2021. 1 each 0     No current facility-administered medications on file prior to encounter. REVIEW OF SYSTEMS    Pertinent items are noted in HPI.       Objective:      /76   Pulse 70   Temp 98.3 °F (36.8 °C) (Oral)   Resp 18     PHYSICAL EXAM    Extremities: no cyanosis and no clubbing,2+ nonpitting edema with two granulating wounds   Procedure:  Skin Substitute Application    Performed by: Monet Hill MD    Ulcer Type: venous    Consent obtained: Yes    Time out taken: Yes    Product Utilized:      Apligraf    [x] 44 sq/cm   [] 88 sq/cm    [] 132 sq/cm    Dermagraft    [] 38 sq/cm   [] 76 sq/cm    PuraPly AM    [] 1.6 sq/cm  [] 4 sq/cm  [] 8 sq/cm    [] 25 sq/cm   [] 54sq/cm     NuShield    [] 1.6 sq/cm   [] 6 sq/cm    [] 8 sq/cm   [] 12 sq/cm [] 16 sq/cm [] 24 sq/cm    Affinity    [] 2.25 sq/cm   [] 6.25 sq/cm            EpiFix    [] 14 mm disc (1.54 cm)  [] 18 mm disc  [] 4 sq/cm                                     [] 6 sq/cm    [] 8 sq/cm  [] 11 sq/cm     Epicord  [] 6 sq/cm        TheraSkin [] 13 sq/cm    [] 39 sq/cm                  Fenestrated: No    Mesher Utilized: No    Instrument(s) scissors and forceps    Skin Substitute was Applied to Ulcer Number(s):    Ulcer #: 6      Wound 05/14/18 #5 right lower medial leg venous; lymphedema ( had since 1/2016) (Active)   Wound Image   7/25/2018  9:22 AM   Wound Type Wound 8/3/2018 10:07 AM   Wound Venous 7/20/2018  8:14 AM   Dressing/Treatment Other (Comment) 7/20/2018  8:14 AM   Wound Cleansed Rinsed/Irrigated with saline 8/3/2018 10:07 AM   Wound Length (cm) 1.7 cm 8/3/2018 10:07 AM   Wound Width (cm) 2 cm 8/3/2018 10:07 AM   Wound Depth (cm)  0.1 8/3/2018 10:07 AM   Calculated Wound Size (cm^2) (l*w) 3.4 cm^2 8/3/2018 10:07 AM   Change in Wound Size % (l*w) 91.41 8/3/2018 10:07 AM   Distance Tunneling (cm) 0 cm 8/3/2018 10:07 AM   Undermining Maxium Distance (cm) 0 8/3/2018 10:07 AM   Wound Assessment Yellow;Slough; Red 8/3/2018 10:07 AM   Drainage Amount Moderate 8/3/2018 10:07 AM   Drainage Description Brown;Yellow 8/3/2018 10:07 AM   Odor Mild 8/3/2018 10:07 AM   Margins Defined edges 8/3/2018 10:07 AM   Kiah-wound Assessment Pink 8/3/2018 10:07 AM   Non-staged Wound Description Full thickness 8/3/2018 10:07 AM   [place]%Wound Bed 0 7/16/2018  8:17 AM   Red%Wound Bed 40 8/3/2018 10:07 AM   Yellow%Wound Bed 60 8/3/2018 10:07 AM   Other%Wound Bed 0 8/3/2018 10:07 AM   Op First Treatment Date 05/14/18 6/4/2018  9:36 AM   Number of days: 81       Wound 05/14/18 #6 right lower leg posterior venous/ lymphedema  (Active)   Wound Image   7/25/2018  9:22 AM   Wound Type Wound 8/3/2018 10:07 AM   Wound Venous 7/30/2018  8:31 AM   Dressing/Treatment Other (Comment) 7/20/2018  8:14 AM   Wound Cleansed Rinsed/Irrigated with saline 8/3/2018 10:07 AM   Wound Length (cm) 4 cm 8/3/2018 10:07 AM   Wound Width (cm) 2 cm 8/3/2018 10:07 AM   Wound Depth (cm)  0.1 8/3/2018 10:07 AM   Calculated Wound Size (cm^2) (l*w) 8 cm^2 8/3/2018 10:07 AM   Change in Wound Size % (l*w) 75.19 8/3/2018 10:07 AM   Distance Tunneling (cm) 0 cm 8/3/2018 10:07 AM   Undermining Maxium Distance (cm) 0 8/3/2018 10:07 AM   Wound Assessment Yellow;Slough;Red;Gray 8/3/2018 10:07 AM   Drainage Amount Moderate 8/3/2018 10:07 AM   Drainage Description Brown;Green;Yellow 8/3/2018 10:07 AM   Odor Mild 8/3/2018 10:07 AM   Margins Defined edges 8/3/2018 10:07 AM   Kiah-wound Assessment Pink 8/3/2018 10:07 AM   Non-staged Wound Description Full thickness 8/3/2018 10:07 AM   [place]%Wound Bed 0 7/16/2018  8:17 AM   Red%Wound Bed 70 8/3/2018 10:07 AM   Yellow%Wound Bed 25 8/3/2018 10:07 AM   Other%Wound Bed 5 - gray 8/3/2018 10:07 AM   Op First Treatment Date 05/14/18 6/4/2018  9:36 AM   Number of days: 81       Diabetic/Pressure/Non Pressure Ulcers:  Ulcer: Non-Pressure ulcer, fat layer exposed      Total Surface Area of Ulcer(s) Covered 8 sq/cm    Was the Product Layered Rinsed/Irrigated with saline 8/3/2018 10:07 AM   Wound Length (cm) 4 cm 8/3/2018 10:07 AM   Wound Width (cm) 2 cm 8/3/2018 10:07 AM   Wound Depth (cm)  0.1 8/3/2018 10:07 AM   Calculated Wound Size (cm^2) (l*w) 8 cm^2 8/3/2018 10:07 AM   Change in Wound Size % (l*w) 75.19 8/3/2018 10:07 AM   Distance Tunneling (cm) 0 cm 8/3/2018 10:07 AM   Undermining Maxium Distance (cm) 0 8/3/2018 10:07 AM   Wound Assessment Yellow;Slough;Red;Gray 8/3/2018 10:07 AM   Drainage Amount Moderate 8/3/2018 10:07 AM   Drainage Description Brown;Green;Yellow 8/3/2018 10:07 AM   Odor Mild 8/3/2018 10:07 AM   Margins Defined edges 8/3/2018 10:07 AM   Kiah-wound Assessment Pink 8/3/2018 10:07 AM   Non-staged Wound Description Full thickness 8/3/2018 10:07 AM   [place]%Wound Bed 0 7/16/2018  8:17 AM   Red%Wound Bed 70 8/3/2018 10:07 AM   Yellow%Wound Bed 25 8/3/2018 10:07 AM   Other%Wound Bed 5 - gray 8/3/2018 10:07 AM   Op First Treatment Date 05/14/18 6/4/2018  9:36 AM   Number of days: 81         Plan:     Treatment Note please see attached Discharge Instructions    In my professional opinion this patient would benefit from HBO Therapy: No       Patient is to return to wound care center in:  1 week(s)    Written patient dismissal instructions given to patient and signed by patient or POA. Discharge [facility] Physician Orders and Discharge Instructions  [facility] [address]   [address] [place] [address]  Telephone: [phone]    NAME:  Jacquie Nixon  YOB: 1965  MEDICAL RECORD NUMBER:  [id number]  DATE:  8/3/2018    Wash hands with soap and water prior to and after every dressing change. Wound Cleansing:   · Do not scrub or use excessive force. · With each dressing change, rinse wounds with 0.9% Saline. (May use wound wash or soft contact solution. Both can be purchased at a local drug store).    · If unable to obtain saline, may use a with saline    [] Bactroban/Mupirocin [] Polysporin/Neosporin  [x] Other: 4th application of apligraf, covered with mepitel, secured with steri strips     Pack wound loosely with: [] Iodoform   [] Plain Packing  []Saline \"wet to dry\"     [x] Cover and Secure with:     [x] Dry Gauze [] ABD [] Cast padding [] Kerlix or Lyndsey rolled gauze   [] Coban [] Ace Wrap  [] Ace Wrap from forefoot to just below the knee  [] Paper Tape  [] Medipore Tape   [] Other:    Avoid contact of tape with skin if possible. [x] Change dressing:  [] Daily    [] Every Other Day [] Three times per week   [] Once a week [x] Do Not Change Dressing   [] Other:      Application of a biologic skin substitute:   Wound Location:   Apligraf to right posterior wound #6 : This is a specialized wound care dressing that is intended to enhance your own bodies ability to increase growth factors and other healing abilities. Please leave the dressing clean, dry and intact unless otherwise instructed by your physician and nursing. Edema Control:  Apply: [x] Compression Stocking []Right Leg [x]Left Leg     [] Double Layer Medigrip/Single Layer Spandagrip       []Right Leg  []Left Leg        []Low compression 5-10 mm/Hg      []Medium compression 10-20 mm/Hg     []High compression  20-30 mm/Hg    Apply every morning immediately when getting up. They should be applied to affected leg(s) from mid foot to knee making sure to cover the heel. Remove every night before going to bed. [x] Elevate leg(s) above the level of the heart for 30 minutes 4-5 times a day and/or when sitting. [x] Avoid prolonged standing in one place. Lymphedema Therapy:   [x] Wear Lymphedema pumps twice a day at settings:30-40       [x]Elevate leg(s) above the level of the heart for 30 minutes 4-5 times a day and/or when sitting. [x] Avoid prolonged standing in one place.     Multilayer Compression Wrap:  Type: Profore 4 Layer Wrap      Applied in Clinic to the : Right lower leg(s)     · Do not get leg(s) with wrap wet. ·  If wraps become too tight call the center or completely remove the wrap. · Elevate leg(s) above the level of the heart when sitting. · Avoid prolonged standing in one place. [] [facility] remove wrap, cleanse affected leg(s) with soap and water, apply wound dressings as ordered above and reapply compression wraps on:         Dietary:  Important dietary reminders:  1. Increase Protein intake (i.e. Lean meats, fish, eggs, legumes, and yogurt)  2. No added salt  3. If diabetic, follow a diabetic diet and check glucose prior to meals or as instructed by your physician. Return Appointment:  [] Wound and dressing supply provider:   [] ECF or Home Healthcare:  [] Nurse visit:    [x] Return Appointment: With  Dr. Dominik Bautista  in [facility])      Your nurse  is:  Jacquelin Rose     Electronically signed by Sonali Degroot RN on 8/3/2018 at 10:40 AM     [facility] Information: Should you experience any significant changes in your wound(s) or have questions about your wound care, please contact the [address] [place] [address] at [phone] Monday-Friday from 8:00 am - 4:30 pm except for Wednesdays which hours are from 8:00 am - 2:00 pm.   If you need help with your wound outside these hours and cannot wait until we are again available, contact your PCP or go to the hospital emergency room. PLEASE NOTE: IF YOU ARE UNABLE TO OBTAIN WOUND SUPPLIES, CONTINUE TO USE THE SUPPLIES YOU HAVE AVAILABLE UNTIL YOU ARE ABLE TO REACH US. IT IS MOST IMPORTANT TO KEEP THE WOUND COVERED AT ALL TIMES.       Physician orders by:     [] Dr Sangita Khan [] Dr Riky Lui    [] Dr Rosa Maria Moss     [] Dr Umm Cordero   [x] Dr Jae Santiago  [] Dr Nolan Milner  [] Dr Lesly Mendez       Physician Signature:__________________________________        The information contained in the After Visit Summary has been reviewed with me, the patient

## 2018-08-10 ENCOUNTER — HOSPITAL ENCOUNTER (OUTPATIENT)
Dept: WOUND CARE | Age: 53
Discharge: HOME | End: 2018-08-10
Payer: MEDICAID

## 2018-08-10 DIAGNOSIS — I87.311 IDIOPATHIC CHRONIC VENOUS HYPERTENSION OF RIGHT LOWER EXTREMITY WITH ULCER (HCC): ICD-10-CM

## 2018-08-10 DIAGNOSIS — Z79.01 CHRONIC ANTICOAGULATION: Primary | ICD-10-CM

## 2018-08-10 DIAGNOSIS — R60.0 LOCALIZED EDEMA: ICD-10-CM

## 2018-08-10 DIAGNOSIS — I82.220 THROMBOSIS OF INFERIOR VENA CAVA (HCC): ICD-10-CM

## 2018-08-10 DIAGNOSIS — L97.919 IDIOPATHIC CHRONIC VENOUS HYPERTENSION OF RIGHT LOWER EXTREMITY WITH ULCER (HCC): ICD-10-CM

## 2018-08-10 PROCEDURE — 29581 APPL MULTLAYER CMPRN SYS LEG: CPT

## 2018-08-10 PROCEDURE — 11042 DBRDMT SUBQ TIS 1ST 20SQCM/<: CPT

## 2018-08-10 PROCEDURE — 6370000000 HC RX 637 (ALT 250 FOR IP): Performed by: SPECIALIST

## 2018-08-10 PROCEDURE — 11042 DBRDMT SUBQ TIS 1ST 20SQCM/<: CPT | Performed by: SPECIALIST

## 2018-08-10 RX ORDER — LIDOCAINE HYDROCHLORIDE 40 MG/ML
2.5 SOLUTION TOPICAL ONCE
Status: COMPLETED | OUTPATIENT
Start: 2018-08-10 | End: 2018-08-10

## 2018-08-10 RX ADMIN — LIDOCAINE HYDROCHLORIDE 2.5 ML: 40 SOLUTION TOPICAL at 09:59

## 2018-08-10 NOTE — PROGRESS NOTES
[facility]  Progress Note and Procedure Note      Jhon Keenan  MEDICAL RECORD NUMBER:  [id number]  AGE: 48 y.o. GENDER: male  : 1965  EPISODE DATE:  8/10/2018    Subjective:     Chief Complaint   Patient presents with    Wound Check     f/u right lower leg wound         HISTORY of PRESENT ILLNESS HPI     Jhon Keenan is a 48 y.o. male who presents today for wound/ulcer evaluation. History of Wound Context: long history of phlebolymphedema of RLE and occlusion of of IVC and right iliac veins. Seen at the [address] [place] [address] where unsuccessfull dilation of IVC was done. He has been grafted with compression and utilization of lymphedema pumps with near complete healing. However, he has gone back to work and is on his feet causing partial loss of graft. Pain Assessment:  Wound/Ulcer Pain Timing/Severity: none  Quality of pain: N/A  Severity:  0 / 10   Modifying Factors: None  Associated Signs/Symptoms: edema    Ulcer Identification:  Ulcer Type: venous  Contributing Factors: edema, venous stasis, lymphedema, obesity and anticoagulation therapy    Objective: There were no vitals taken for this visit. Wt Readings from Last 3 Encounters:   18 242 lb 8.1 oz (110 kg)   18 238 lb 1.6 oz (108 kg)   18 238 lb (108 kg)       PHYSICAL EXAM    Extremities: no cyanosis and no clubbing,2+ nonpitting edema with two wounds RLE    Assessment:      No diagnosis found. Procedure Note  Indications:  Based on my examination of this patient's wound(s) today, sharp excision is required to promote healing and evaluate the extent healing. Performed by: Katia Choi MD    Consent obtained? Yes    Time out taken: Yes    Pain Control: Anesthetic: 4% Lidocaine Liquid Topical     Debridement:Excisional Debridement    Using curette the wound was sharply debrided    down through and including the removal of  epidermis, dermis and subcutaneous tissue.     Devitalized Tissue Debrided: fibrin      Pre Debridement Measurements:  Are located in the Wound Documentation Flow Sheet   Wound #: 6     Post  Debridement Measurements:  Wound 05/14/18 #5 right lower medial leg venous; lymphedema ( had since 1/2016) (Active)   Wound Image   7/25/2018  9:22 AM   Wound Type Wound 8/10/2018  9:49 AM   Wound Venous 8/10/2018  9:49 AM   Dressing/Treatment Other (Comment) 7/20/2018  8:14 AM   Wound Cleansed Rinsed/Irrigated with saline 8/10/2018  9:49 AM   Wound Length (cm) 2.5 cm 8/10/2018  9:49 AM   Wound Width (cm) 3.6 cm 8/10/2018  9:49 AM   Wound Depth (cm)  0.1 8/10/2018  9:49 AM   Calculated Wound Size (cm^2) (l*w) 9 cm^2 8/10/2018  9:49 AM   Change in Wound Size % (l*w) 77.27 8/10/2018  9:49 AM   Distance Tunneling (cm) 0 cm 8/10/2018  9:49 AM   Undermining Maxium Distance (cm) 0 8/10/2018  9:49 AM   Wound Assessment Yellow;Slough;Red;Pink;Light purple 8/10/2018  9:49 AM   Drainage Amount Small 8/10/2018  9:49 AM   Drainage Description Serosanguinous; Yellow 8/10/2018  9:49 AM   Odor Mild 8/10/2018  9:49 AM   Margins Defined edges 8/10/2018  9:49 AM   Kiah-wound Assessment Pink 8/10/2018  9:49 AM   Non-staged Wound Description Full thickness 8/10/2018  9:49 AM   [place]%Wound Bed 10 8/10/2018  9:49 AM   Red%Wound Bed 40 8/10/2018  9:49 AM   Yellow%Wound Bed 40 8/10/2018  9:49 AM   Purple%Wound Bed 10 8/10/2018  9:49 AM   Other%Wound Bed 0 8/3/2018 10:07 AM   Op First Treatment Date 05/14/18 6/4/2018  9:36 AM   Number of days: 88       Wound 05/14/18 #6 right lower leg posterior venous/ lymphedema  (Active)   Wound Image   7/25/2018  9:22 AM   Wound Type Wound 8/10/2018  9:49 AM   Wound Venous 8/10/2018  9:49 AM   Dressing/Treatment Other (Comment) 7/20/2018  8:14 AM   Wound Cleansed Rinsed/Irrigated with saline 8/10/2018  9:49 AM   Wound Length (cm) 4 cm 8/10/2018  9:49 AM   Wound Width (cm) 2.2 cm 8/10/2018  9:49 AM   Wound Depth (cm)  0.1 8/10/2018  9:49 AM   Calculated Wound Size (cm^2) (l*w) 8.8 cm^2 8/10/2018  9:49 AM   Change in Wound Size % (l*w) 72.71 8/10/2018  9:49 AM   Distance Tunneling (cm) 0 cm 8/10/2018  9:49 AM   Undermining Maxium Distance (cm) 0 8/10/2018  9:49 AM   Wound Assessment Light purple;Pink;Red;Yellow 8/10/2018  9:49 AM   Drainage Amount Moderate 8/10/2018  9:49 AM   Drainage Description Brown;Green;Serosanguinous; Yellow 8/10/2018  9:49 AM   Odor Mild 8/10/2018  9:49 AM   Margins Defined edges 8/10/2018  9:49 AM   Kiah-wound Assessment Pink 8/10/2018  9:49 AM   Non-staged Wound Description Full thickness 8/10/2018  9:49 AM   [place]%Wound Bed 20 8/10/2018  9:49 AM   Red%Wound Bed 10 8/10/2018  9:49 AM   Yellow%Wound Bed 30 8/10/2018  9:49 AM   Purple%Wound Bed 40 8/10/2018  9:49 AM   Other%Wound Bed 5 - gray 8/3/2018 10:07 AM   Op First Treatment Date 05/14/18 6/4/2018  9:36 AM   Number of days: 88       Percent of Wound Debrided: 100%    Total Surface Area Debrided:  8 sq cm    Diabetic/Pressure/Non Pressure Ulcers only:  Ulcer: Non-Pressure ulcer, fat layer exposed    Bleeding: None    Hemostasis Achieved: by pressure    Procedural Pain: 0  / 10     Post Procedural Pain: 0 / 10     Response to treatment:  Well tolerated by patient. I have told patient he must either consider disability or again take leave of absence from work for bedrest and elevation in order to heal these wounds. Plan:     Treatment Note: Please see attached Discharge Instructions. These instructions were given and singed by the patient or POA    New Medication(s) at this visit:   New Prescriptions    No medications on file       In my professional opinion this patient would benefit from HBO Therapy: No       Patient is to return to wound care center in:  1 week(s)    Discharge [facility] Physician Orders and Discharge Instructions  [facility]  [address]   [place], [address]  Telephone: [phone]    NAME:  Jimbo Branch questions about your wound care, please contact the [address] [place] [address] at [phone] Monday-Friday from 8:00 am - 4:30 pm except for Wednesdays which hours are from 8:00 am - 2:00 pm.   If you need help with your wound outside these hours and cannot wait until we are again available, contact your PCP or go to the hospital emergency room. PLEASE NOTE: IF YOU ARE UNABLE TO OBTAIN WOUND SUPPLIES, CONTINUE TO USE THE SUPPLIES YOU HAVE AVAILABLE UNTIL YOU ARE ABLE TO REACH US. IT IS MOST IMPORTANT TO KEEP THE WOUND COVERED AT ALL TIMES. Physician orders by:     [] Dr Lesley Herndon [] Dr Aubree Bautista    [] Dr Guido Mejia     [] Dr Lamont aPl   [x] Dr Neil Lares  [] Dr Edson Valdivia  [] Dr Sheryl Alcaraz       Physician Signature:__________________________________        The information contained in the After Visit Summary has been reviewed with me, the patient and/or responsible adult, by my health care provider(s). I had the opportunity to ask questions regarding this information.   I have elected to receive;      [] Patient unable to sign Discharge Instructions given to ECF/Transportation/POA        Electronically signed by Dayron Easley MD on 8/10/2018 at 1:52 PM

## 2018-08-17 ENCOUNTER — HOSPITAL ENCOUNTER (OUTPATIENT)
Dept: WOUND CARE | Age: 53
Discharge: HOME | End: 2018-08-17
Payer: MEDICAID

## 2018-08-17 VITALS
HEART RATE: 75 BPM | TEMPERATURE: 98.2 F | DIASTOLIC BLOOD PRESSURE: 78 MMHG | RESPIRATION RATE: 18 BRPM | SYSTOLIC BLOOD PRESSURE: 135 MMHG

## 2018-08-17 DIAGNOSIS — I89.0 CHRONIC ACQUIRED LYMPHEDEMA: ICD-10-CM

## 2018-08-17 DIAGNOSIS — R60.0 LOCALIZED EDEMA: ICD-10-CM

## 2018-08-17 DIAGNOSIS — I82.423 THROMBOSIS OF BOTH ILIAC VEINS (HCC): ICD-10-CM

## 2018-08-17 DIAGNOSIS — Z79.01 CHRONIC ANTICOAGULATION: ICD-10-CM

## 2018-08-17 DIAGNOSIS — I82.220 THROMBOSIS OF INFERIOR VENA CAVA (HCC): Primary | ICD-10-CM

## 2018-08-17 DIAGNOSIS — I87.311 IDIOPATHIC CHRONIC VENOUS HYPERTENSION OF RIGHT LOWER EXTREMITY WITH ULCER (HCC): ICD-10-CM

## 2018-08-17 DIAGNOSIS — L97.919 IDIOPATHIC CHRONIC VENOUS HYPERTENSION OF RIGHT LOWER EXTREMITY WITH ULCER (HCC): ICD-10-CM

## 2018-08-17 PROCEDURE — 87205 SMEAR GRAM STAIN: CPT

## 2018-08-17 PROCEDURE — 29581 APPL MULTLAYER CMPRN SYS LEG: CPT

## 2018-08-17 PROCEDURE — 87070 CULTURE OTHR SPECIMN AEROBIC: CPT

## 2018-08-17 PROCEDURE — 87077 CULTURE AEROBIC IDENTIFY: CPT

## 2018-08-17 PROCEDURE — 11042 DBRDMT SUBQ TIS 1ST 20SQCM/<: CPT

## 2018-08-17 PROCEDURE — 11042 DBRDMT SUBQ TIS 1ST 20SQCM/<: CPT | Performed by: SPECIALIST

## 2018-08-17 PROCEDURE — 87186 SC STD MICRODIL/AGAR DIL: CPT

## 2018-08-17 PROCEDURE — 6370000000 HC RX 637 (ALT 250 FOR IP): Performed by: SPECIALIST

## 2018-08-17 RX ORDER — LIDOCAINE HYDROCHLORIDE 40 MG/ML
SOLUTION TOPICAL ONCE
Status: COMPLETED | OUTPATIENT
Start: 2018-08-17 | End: 2018-08-17

## 2018-08-17 RX ADMIN — LIDOCAINE HYDROCHLORIDE 2.5 ML: 40 SOLUTION TOPICAL at 09:55

## 2018-08-17 ASSESSMENT — PAIN SCALES - GENERAL: PAINLEVEL_OUTOF10: 0

## 2018-08-17 NOTE — PROGRESS NOTES
[facility]  Progress Note and Procedure Note      Ayaan Huynh  MEDICAL RECORD NUMBER:  [id number]  AGE: 48 y.o. GENDER: male  : 1965  EPISODE DATE:  2018    Subjective:     Chief Complaint   Patient presents with    Wound Check     right lower leg wounds         HISTORY of PRESENT ILLNESS HPI     Ayaan Huynh is a 48 y.o. male who presents today for wound/ulcer evaluation. 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 [place] [facility]C was done. He has been grafted with compression wraps and lymphedema pumps with near complete healing. However, he has gone back to work and is on his feet causing partial loss of graft. Pain Assessment:  Wound/Ulcer Pain Timing/Severity: none  Quality of pain: N/A  Severity:  0 / 10   Modifying Factors: None  Associated Signs/Symptoms: edema    Ulcer Identification:  Ulcer Type: venous  Contributing Factors: edema, venous stasis, lymphedema, obesity and anticoagulation therapy    Objective:      /78   Pulse 75   Temp 98.2 °F (36.8 °C) (Oral)   Resp 18     Wt Readings from Last 3 Encounters:   18 242 lb 8.1 oz (110 kg)   18 238 lb 1.6 oz (108 kg)   18 238 lb (108 kg)       PHYSICAL EXAM    Extremities: no cyanosis and no clubbing,2+ nonpitting edema with fibrosis and varicosities, 2 wounds granulating/exudative RLE. Assessment:      No diagnosis found. Procedure Note  Indications:  Based on my examination of this patient's wound(s) today, sharp excision is required to promote healing and evaluate the extent healing. Performed by: Orvel Ormond, MD    Consent obtained?  Yes    Time out taken: Yes    Pain Control: Anesthetic: 4% Lidocaine Liquid Topical     Debridement:Excisional Debridement    Using curette the wound was sharply debrided    down through and including the removal of  epidermis, dermis and subcutaneous tissue. Devitalized Tissue Debrided:  fibrin, slough and exudate      Pre Debridement Measurements:  Are located in the Wound Documentation Flow Sheet   Wound #: 5 and 6     Post  Debridement Measurements:  Wound 05/14/18 #5 right lower medial leg venous; lymphedema ( had since 1/2016) (Active)   Wound Image   7/25/2018  9:22 AM   Wound Type Wound 8/17/2018  9:41 AM   Wound Venous 8/17/2018  9:41 AM   Dressing/Treatment Other (Comment) 7/20/2018  8:14 AM   Wound Cleansed Rinsed/Irrigated with saline 8/17/2018  9:41 AM   Wound Length (cm) 2.8 cm 8/17/2018  9:41 AM   Wound Width (cm) 3.2 cm 8/17/2018  9:41 AM   Wound Depth (cm)  0.1 8/17/2018  9:41 AM   Calculated Wound Size (cm^2) (l*w) 8.96 cm^2 8/17/2018  9:41 AM   Change in Wound Size % (l*w) 77.37 8/17/2018  9:41 AM   Distance Tunneling (cm) 0 cm 8/17/2018  9:41 AM   Undermining Maxium Distance (cm) 0 8/17/2018  9:41 AM   Wound Assessment Red;Dark edges 8/17/2018  9:41 AM   Drainage Amount Small 8/17/2018  9:41 AM   Drainage Description Brown;Serosanguinous 8/17/2018  9:41 AM   Odor Mild 8/17/2018  9:41 AM   Margins Defined edges 8/17/2018  9:41 AM   Kiah-wound Assessment Pink; Tan 8/17/2018  9:41 AM   Non-staged Wound Description Full thickness 8/17/2018  9:41 AM   [place]%Wound Bed 10 8/10/2018  9:49 AM   Red%Wound Bed 100 8/17/2018  9:41 AM   Yellow%Wound Bed 40 8/10/2018  9:49 AM   Purple%Wound Bed 10 8/10/2018  9:49 AM   Other%Wound Bed 0 8/3/2018 10:07 AM   Op First Treatment Date 05/14/18 6/4/2018  9:36 AM   Number of days: 95       Wound 05/14/18 #6 right lower leg posterior venous/ lymphedema  (Active)   Wound Image   7/25/2018  9:22 AM   Wound Type Wound 8/17/2018  9:41 AM   Wound Venous 8/17/2018  9:41 AM   Dressing/Treatment Other (Comment) 7/20/2018  8:14 AM   Wound Cleansed Rinsed/Irrigated with saline 8/17/2018  9:41 AM   Wound Length (cm) 4.2 cm 8/17/2018  9:41 AM   Wound Width (cm) 2.2 cm 8/17/2018  9:41 AM   Wound Depth (cm)  0.1 8/17/2018  9:41 Karly Herrera  YOB: 1965  MEDICAL RECORD NUMBER:  [id number]  DATE:  8/17/2018    Wash hands with soap and water prior to and after every dressing change. Wound Cleansing:   · Do not scrub or use excessive force. · With each dressing change, rinse wounds with 0.9% Saline. (May use wound wash or soft contact solution. Both can be purchased at a local drug store). · If unable to obtain saline, may use a gentle soap and water. · Keep wounds dry in the shower unless otherwise instructed by the physician. Topical Treatments:  Do not apply lotions, creams, or ointments to wound bed unless directed. [] Apply moisturizing lotion to skin surrounding the wound prior to dressing change.  [] Apply antifungal ointment to skin surrounding the wound prior to dressing change.  [] Apply thin film of moisture barrier ointment to skin immediately around wound. [] Other:      Dressings:           Wound Location: right lower leg wounds     [x] Apply Primary Dressing to wound:       [] Foam/Foam with Border(i.e Mepilex) [] Non-adherent (i.e.Mepitel)   [] Alginate with Silver (i.e. Silvercel)   [] Alginate   [] Collagen (i.e. Puracol) [] Collagen with Silver(i.e. Candice)     [] Hydrocolloid  [x] Hydrafera Blue moistened with saline   [] MediHoney Paste/Gel [] Hydrogel   [] Endoform      [] Santyl covered with gauze moisten with saline    [] Bactroban/Mupirocin [] Polysporin/Neosporin  [] Other:      Pack wound loosely with: [] Iodoform   [] Plain Packing  [] Saline \"wet to dry\"      [x] Cover and Secure with:     [] Dry Gauze [x] ABD [] Cast padding [] Kerlix or Lyndsey rolled gauze   [] Coban [] Ace Wrap  [] Ace Wrap from forefoot to just below the knee  [] Paper Tape  [] Medipore Tape   [] Other:    Avoid contact of tape with skin if possible.     [x] Change dressing: [] Daily    [] Every Other Day [] Three times per week   [] Once a week [x] Do Not Change Dressing   [] Other:       Edema

## 2018-08-17 NOTE — PROGRESS NOTES
Multilayer Compression Wrap   (Not Unna) Below the Knee    NAME:  Braden Martinez  YOB: 1965  MEDICAL RECORD NUMBER:  [id number]  DATE:  8/17/2018       [x] Removed old Multilayer wrap if present and washed leg with mild soap/water.  [x] Applied moisturizing agent to dry skin as needed.  [x] Applied primary and secondary dressing as ordered     [x] Applied multilayered dressing below the knee to Right lower leg(s)  (Profore 4 Layer Wrap) per 's instructions.  [x] Instructed patient/caregiver not to remove dressing and to keep it clean and dry.  [x] Instructed patient/caregiver on complications to report to provider, such as pain, numbness in toes, heavy drainage, and slippage of dressing.  [x] Instructed patient on purpose of compression dressing and on activity and exercise recommendations.        Applied per   Guidelines    Electronically signed by Félix Wilburn RN on 8/17/2018 at 10:53 AM

## 2018-08-20 LAB
GRAM STAIN RESULT: ABNORMAL
ORGANISM: ABNORMAL
ORGANISM: ABNORMAL
WOUND/ABSCESS: ABNORMAL

## 2018-08-20 RX ORDER — CIPROFLOXACIN 500 MG/1
500 TABLET, FILM COATED ORAL 2 TIMES DAILY
Qty: 20 TABLET | Refills: 0 | Status: SHIPPED | OUTPATIENT
Start: 2018-08-20 | End: 2018-08-30

## 2018-08-24 ENCOUNTER — HOSPITAL ENCOUNTER (OUTPATIENT)
Dept: WOUND CARE | Age: 53
Discharge: HOME | End: 2018-08-24
Payer: MEDICAID

## 2018-08-24 VITALS
SYSTOLIC BLOOD PRESSURE: 131 MMHG | RESPIRATION RATE: 18 BRPM | HEART RATE: 74 BPM | TEMPERATURE: 98 F | DIASTOLIC BLOOD PRESSURE: 79 MMHG

## 2018-08-24 DIAGNOSIS — R60.0 LOCALIZED EDEMA: ICD-10-CM

## 2018-08-24 DIAGNOSIS — I82.220 THROMBOSIS OF INFERIOR VENA CAVA (HCC): ICD-10-CM

## 2018-08-24 DIAGNOSIS — L97.919 IDIOPATHIC CHRONIC VENOUS HYPERTENSION OF RIGHT LOWER EXTREMITY WITH ULCER (HCC): ICD-10-CM

## 2018-08-24 DIAGNOSIS — I87.311 IDIOPATHIC CHRONIC VENOUS HYPERTENSION OF RIGHT LOWER EXTREMITY WITH ULCER (HCC): ICD-10-CM

## 2018-08-24 DIAGNOSIS — I82.423 THROMBOSIS OF BOTH ILIAC VEINS (HCC): ICD-10-CM

## 2018-08-24 DIAGNOSIS — Z79.01 CHRONIC ANTICOAGULATION: ICD-10-CM

## 2018-08-24 DIAGNOSIS — I89.0 CHRONIC ACQUIRED LYMPHEDEMA: Primary | ICD-10-CM

## 2018-08-24 PROCEDURE — 15271 SKIN SUB GRAFT TRNK/ARM/LEG: CPT | Performed by: SPECIALIST

## 2018-08-24 PROCEDURE — 29581 APPL MULTLAYER CMPRN SYS LEG: CPT

## 2018-08-24 PROCEDURE — 6370000000 HC RX 637 (ALT 250 FOR IP): Performed by: SPECIALIST

## 2018-08-24 PROCEDURE — 15272 SKIN SUB GRAFT T/A/L ADD-ON: CPT | Performed by: SPECIALIST

## 2018-08-24 PROCEDURE — 15271 SKIN SUB GRAFT TRNK/ARM/LEG: CPT

## 2018-08-24 RX ORDER — LIDOCAINE HYDROCHLORIDE 40 MG/ML
SOLUTION TOPICAL ONCE
Status: COMPLETED | OUTPATIENT
Start: 2018-08-24 | End: 2018-08-24

## 2018-08-24 RX ADMIN — LIDOCAINE HYDROCHLORIDE 2.5 ML: 40 SOLUTION TOPICAL at 09:43

## 2018-08-24 ASSESSMENT — PAIN SCALES - GENERAL: PAINLEVEL_OUTOF10: 0

## 2018-08-24 NOTE — PROGRESS NOTES
Apligraf Treatment Note      YOB: 1965  MEDICAL RECORD NUMBER:  [id number]  DATE:  8/24/2018    Goal: Patient will receive safe and proper application of skin substitute. Patient will comply with caring for dressing, offloading and reporting complications. [x]Expiration date and pH of Apligraf checked immediately prior to use. [x] Package intact prior to use and no damage noted. [x] Transport temperature controlled and acceptable. Apligraf was removed from protective sterile packaging by provider and applied to prepared wound bed. Apligraf was placed dermal side down onto the wound bed. Apligraf was applied to Ulcer #: 6  and affixed with steri-strips and nonadherent by the provider. [x] Secondary dressing applied by nursing as ordered     [x] Patient/caregiver was instructed not to remove dressing and to keep it clean, dry and intact until next visit. See AVS for further instructions given to patient/caregiver. Apligraf may be applied a total of  5 times per wound over a 12 week period. Additionally Apligraf may only be used every 12 months per wound. Date of first application of Apligraf for this current wound is June 18, 2018.      Application # 5      Guidelines followed    Electronically signed by Angel Enriquez RN on 8/24/2018 at 10:10 AM

## 2018-08-24 NOTE — PROGRESS NOTES
[facility] [place]  Progress Note and Procedure Note      Devin Martinez  MEDICAL RECORD NUMBER:  [id number]  AGE: 48 y.o. GENDER: male  : 1965  EPISODE DATE:  2018    Subjective:     Chief Complaint   Patient presents with    Wound Check     f/u right lower leg wound         HISTORY of PRESENT ILLNESS HPI     Devin Martinez is a 48 y.o. male who presents today for wound/ulcer evaluation. 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 Seen at [address] [place] [address] where unsuccessful dilatation of IVC was done. He has been grafted with compression wraps and lymphedema pumps with near complete healing. However, he has gone back to work and is on his feet causing partial loss of graft.     Pain Assessment:  Wound/Ulcer Pain Timing/Severity: none  Quality of pain: N/A  Severity:  0 / 10   Modifying Factors: None  Associated Signs/Symptoms: edema     Ulcer Identification:  Ulcer Type: venous  Contributing Factors: edema, venous stasis, lymphedema, obesity and anticoagulation therapy       Pain Assessment:  Wound/Ulcer Pain Timing/Severity: none  Quality of pain: N/A  Severity:  0 / 10   Modifying Factors: None  Associated Signs/Symptoms: edema    Ulcer Identification:  Ulcer Type: venous  Contributing Factors: edema, venous stasis, lymphedema, obesity and anticoagulation therapy    Objective:      /79   Pulse 74   Temp 98 °F (36.7 °C) (Oral)   Resp 18     Wt Readings from Last 3 Encounters:   18 242 lb 8.1 oz (110 kg)   18 238 lb 1.6 oz (108 kg)   18 238 lb (108 kg)       PHYSICAL EXAM    Extremities: no cyanosis and no clubbing,granulating wounds posterior and medial ankle    Assessment:      No diagnosis found.         Wound 18 #5 right lower medial leg venous; lymphedema ( had since 2016) (Active)   Wound Image   2018  9:22 AM   Wound Type Wound 2018  9:17 AM   Wound Venous 8/24/2018  9:17 AM   Dressing/Treatment Other (Comment) 7/20/2018  8:14 AM   Wound Cleansed Rinsed/Irrigated with saline 8/24/2018  9:17 AM   Wound Length (cm) 2.3 cm 8/24/2018  9:17 AM   Wound Width (cm) 3 cm 8/24/2018  9:17 AM   Wound Depth (cm)  0.1 8/24/2018  9:17 AM   Calculated Wound Size (cm^2) (l*w) 6.9 cm^2 8/24/2018  9:17 AM   Change in Wound Size % (l*w) 82.58 8/24/2018  9:17 AM   Distance Tunneling (cm) 0 cm 8/24/2018  9:17 AM   Undermining Maxium Distance (cm) 0 8/24/2018  9:17 AM   Wound Assessment Red;Dark edges;Gray 8/24/2018  9:17 AM   Drainage Amount Small 8/24/2018  9:17 AM   Drainage Description Brown;Serosanguinous 8/24/2018  9:17 AM   Odor Mild 8/24/2018  9:17 AM   Margins Defined edges 8/24/2018  9:17 AM   Kiah-wound Assessment Pink; Tan 8/24/2018  9:17 AM   Non-staged Wound Description Full thickness 8/24/2018  9:17 AM   [place]%Wound Bed 10 8/10/2018  9:49 AM   Red%Wound Bed 95 8/24/2018  9:17 AM   Yellow%Wound Bed 40 8/10/2018  9:49 AM   Purple%Wound Bed 10 8/10/2018  9:49 AM   Other%Wound Bed gray 5% 8/24/2018  9:17 AM   Op First Treatment Date 05/14/18 6/4/2018  9:36 AM   Number of days: 102       Wound 05/14/18 #6 right lower leg posterior venous/ lymphedema  (Active)   Wound Image   7/25/2018  9:22 AM   Wound Type Wound 8/24/2018  9:17 AM   Wound Venous 8/24/2018  9:17 AM   Dressing/Treatment Other (Comment) 7/20/2018  8:14 AM   Wound Cleansed Rinsed/Irrigated with saline 8/24/2018  9:17 AM   Wound Length (cm) 4.1 cm 8/24/2018  9:17 AM   Wound Width (cm) 2.4 cm 8/24/2018  9:17 AM   Wound Depth (cm)  0.1 8/24/2018  9:17 AM   Calculated Wound Size (cm^2) (l*w) 9.84 cm^2 8/24/2018  9:17 AM   Change in Wound Size % (l*w) 69.49 8/24/2018  9:17 AM   Distance Tunneling (cm) 0 cm 8/24/2018  9:17 AM   Undermining Maxium Distance (cm) 0 8/24/2018  9:17 AM   Wound Assessment Red;Yellow 8/24/2018  9:17 AM   Drainage Amount Moderate 8/24/2018  9:17 AM   Drainage Description Brown;Serosanguinous 8/24/2018  9:17 AM   Odor Mild 8/24/2018  9:17 AM   Margins Defined edges 8/24/2018  9:17 AM   Kiah-wound Assessment Pink;Tan;White 8/24/2018  9:17 AM   Non-staged Wound Description Full thickness 8/24/2018  9:17 AM   [place]%Wound Bed 20 8/10/2018  9:49 AM   Red%Wound Bed 95 8/24/2018  9:17 AM   Yellow%Wound Bed 5 8/24/2018  9:17 AM   Purple%Wound Bed 40 8/10/2018  9:49 AM   Other%Wound Bed 10- gray 8/17/2018  9:41 AM   Op First Treatment Date 05/14/18 6/4/2018  9:36 AM   Number of days: 102       Diabetic/Pressure/Non Pressure Ulcers only:  Ulcer: Non-Pressure ulcer, fat layer exposed    Procedure:  Skin Substitute Application    Performed by: Viktor Munroe MD    Ulcer Type: venous    Consent obtained: Yes    Time out taken: Yes    Product Utilized:      Apligraf    [x] 44 sq/cm   [] 88 sq/cm    [] 132 sq/cm    Dermagraft    [] 38 sq/cm   [] 76 sq/cm    PuraPly AM    [] 1.6 sq/cm  [] 4 sq/cm  [] 8 sq/cm    [] 25 sq/cm   [] 54sq/cm     NuShield    [] 1.6 sq/cm   [] 6 sq/cm    [] 8 sq/cm   [] 12 sq/cm [] 16 sq/cm [] 24 sq/cm    Affinity    [] 2.25 sq/cm   [] 6.25 sq/cm            EpiFix    [] 14 mm disc (1.54 cm)  [] 18 mm disc  [] 4 sq/cm                                     [] 6 sq/cm    [] 8 sq/cm  [] 11 sq/cm     Epicord  [] 6 sq/cm        TheraSkin [] 13 sq/cm    [] 39 sq/cm                  Fenestrated: No    Mesher Utilized: No    Instrument(s) scissors and forceps    Skin Substitute was Applied to Ulcer Number(s):    Ulcer #: 6      Wound 05/14/18 #5 right lower medial leg venous; lymphedema ( had since 1/2016) (Active)   Wound Image   7/25/2018  9:22 AM   Wound Type Wound 8/24/2018  9:17 AM   Wound Venous 8/24/2018  9:17 AM   Dressing/Treatment Other (Comment) 7/20/2018  8:14 AM   Wound Cleansed Rinsed/Irrigated with saline 8/24/2018  9:17 AM   Wound Length (cm) 2.3 cm 8/24/2018  9:17 AM   Wound Width (cm) 3 cm 8/24/2018  9:17 AM   Wound Depth (cm)  0.1 8/24/2018  9:17 AM   Calculated Wound Size (cm^2) (l*w) 8/24/2018  9:17 AM   Purple%Wound Bed 40 8/10/2018  9:49 AM   Other%Wound Bed 10- gray 8/17/2018  9:41 AM   Op First Treatment Date 05/14/18 6/4/2018  9:36 AM   Number of days: 102       Diabetic/Pressure/Non Pressure Ulcers:  Ulcer: Non-Pressure ulcer, fat layer exposed      Total Surface Area of Ulcer(s) Covered 9.8 sq/cm    Was the Product Layered  No     Amount of Product Applied 9.8 sq/cm     Amount of Product Wasted  34.2sq/cm     Reason for Waste Wound Size smaller then product size      Surgically Fixated: No    Secured With: Steri Strips and Mepitel     Procedural Pain: 0/10     Post Procedural Pain: 0 / 10    Response to Treatment: Well tolerated by patient. last application of Apligraf applied today. Response to treatment:  Well tolerated by patient. Plan:     Treatment Note: Please see attached Discharge Instructions. These instructions were given and singed by the patient or POA    New Medication(s) at this visit:   New Prescriptions    No medications on file       In my professional opinion this patient would benefit from HBO Therapy: No       Patient is to return to wound care center in:  1 week(s)    Discharge [facility] Physician Orders and Discharge Instructions  [place] [address]  Telephone: [phone]    NAME:  Carrie Avalos  YOB: 1965  MEDICAL RECORD NUMBER:  [id number]  DATE:  8/24/2018    Wash hands with soap and water prior to and after every dressing change. Wound Cleansing:   · Do not scrub or use excessive force. · With each dressing change, rinse wounds with 0.9% Saline. (May use wound wash or soft contact solution. Both can be purchased at a local drug store). · If unable to obtain saline, may use a gentle soap and water. · Keep wounds dry in the shower unless otherwise instructed by the physician.         Topical Treatments:  Do not apply lotions, creams, or ointments to wound bed unless directed. [] Apply moisturizing lotion to skin surrounding the wound prior to dressing change.  [] Apply antifungal ointment to skin surrounding the wound prior to dressing change.  [] Apply thin film of moisture barrier ointment to skin immediately around wound. [] Other:      Dressings:           Wound Location: all other right lower leg wounds except posterior      [x] Apply Primary Dressing to wound:       [] Foam/Foam with Border(i.e Mepilex) [] Non-adherent (i.e.Mepitel)   [] Alginate with Silver (i.e. Silvercel)   [] Alginate   [] Collagen (i.e. Puracol) [] Collagen with Silver(i.e. Candice)     [] Hydrocolloid  [x] Hydrafera Blue moistened with saline   [] MediHoney Paste/Gel [] Hydrogel   [] Endoform      [] Santyl covered with gauze moisten with saline    [] Bactroban/Mupirocin [] Polysporin/Neosporin  [] Other:      Pack wound loosely with: [] Iodoform   [] Plain Packing  [] Saline \"wet to dry\"      [x] Cover and Secure with:     [] Dry Gauze [x] ABD [] Cast padding [] Kerlix or Lyndsey rolled gauze   [] Coban [] Ace Wrap  [] Ace Wrap from forefoot to just below the knee  [] Paper Tape  [] Medipore Tape   [] Other:    Avoid contact of tape with skin if possible.     [x] Change dressing: [] Daily    [] Every Other Day [] Three times per week   [] Once a week [x] Do Not Change Dressing   [] Other:    Dressings:           Wound Location: right posterior leg wound     [x] Apply Primary Dressing to wound:       [] Foam/Foam with Border(i.e Mepilex) [] Non-adherent (i.e.Mepitel)   [] Alginate with Silver(i.e. Silvercel) [] Alginate   [] Collagen(i.e. Puracol) [] Collagen with Silver(i.e. Candice)   [] Hydrocolloid  [] Hydrafera Blue moistened with saline   [] MediHoney Paste/Gel [] Hydrogel      [] Santyl covered with gauze moisten with saline    [] Bactroban/Mupirocin [] Polysporin/Neosporin  [x] Other: 5th application of apligraf

## 2018-08-31 ENCOUNTER — HOSPITAL ENCOUNTER (OUTPATIENT)
Dept: WOUND CARE | Age: 53
Discharge: HOME | End: 2018-08-31
Payer: MEDICAID

## 2018-08-31 VITALS
TEMPERATURE: 97.9 F | HEART RATE: 69 BPM | RESPIRATION RATE: 18 BRPM | DIASTOLIC BLOOD PRESSURE: 80 MMHG | SYSTOLIC BLOOD PRESSURE: 131 MMHG

## 2018-08-31 DIAGNOSIS — I87.311 IDIOPATHIC CHRONIC VENOUS HYPERTENSION OF RIGHT LOWER EXTREMITY WITH ULCER (HCC): ICD-10-CM

## 2018-08-31 DIAGNOSIS — I89.0 CHRONIC ACQUIRED LYMPHEDEMA: Primary | ICD-10-CM

## 2018-08-31 DIAGNOSIS — R60.0 LOCALIZED EDEMA: ICD-10-CM

## 2018-08-31 DIAGNOSIS — Z79.01 CHRONIC ANTICOAGULATION: ICD-10-CM

## 2018-08-31 DIAGNOSIS — L97.919 IDIOPATHIC CHRONIC VENOUS HYPERTENSION OF RIGHT LOWER EXTREMITY WITH ULCER (HCC): ICD-10-CM

## 2018-08-31 PROCEDURE — 29581 APPL MULTLAYER CMPRN SYS LEG: CPT

## 2018-08-31 PROCEDURE — 29581 APPL MULTLAYER CMPRN SYS LEG: CPT | Performed by: SPECIALIST

## 2018-08-31 RX ORDER — CIPROFLOXACIN 500 MG/1
500 TABLET, FILM COATED ORAL 2 TIMES DAILY
Qty: 20 TABLET | Refills: 0 | Status: SHIPPED | OUTPATIENT
Start: 2018-08-31 | End: 2018-09-10

## 2018-08-31 NOTE — PROGRESS NOTES
1227 [facility] [place]  Progress Note and Procedure Note      Justin Baeza  AGE: 48 y.o. GENDER: male  : 1965  EPISODE DATE:  2018      Subjective:     Chief Complaint   Patient presents with    Wound Check     Right lower leg         HISTORY of PRESENT ILLNESS HPI     Lalita Tesfaye is a 48 y.o. male who presents today for wound evaluation. 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 Seen at [address] [place] [address] where unsuccessful dilatation of IVC was done. He has been grafted with compression wraps and lymphedema pumps with near complete healing. However, he has gone back to work and is on his feet causing partial loss of graft.   Wound Pain Timing/Severity: none  Quality of pain: N/A  Severity:  0 / 10   Modifying Factors: None  Associated Signs/Symptoms: edema    Wound Identification:  Wound Type: venous  Contributing Factors: edema, venous stasis, lymphedema, obesity and anticoagulation therapy        PAST MEDICAL HISTORY        Diagnosis Date    DVT (deep venous thrombosis) (HCC)     Pain and swelling of right lower leg        PAST SURGICAL HISTORY    Past Surgical History:   Procedure Laterality Date    [facility]        FAMILY HISTORY    Family History   Problem Relation Age of Onset    Diabetes Mother     Heart Attack Father        SOCIAL HISTORY    Social History   Substance Use Topics    Smoking status: Never Smoker    Smokeless tobacco: Never Used    Alcohol use No       ALLERGIES    No Known Allergies    MEDICATIONS    Current Outpatient Prescriptions on File Prior to Encounter   Medication Sig Dispense Refill    warfarin (COUMADIN) 5 MG tablet Please continue to take your home warfarin as prescribed  5mg dose  1 tablet 0    Handicap Placard MISC by Does not apply route Dx Lower loeg DVT

## 2018-09-07 ENCOUNTER — HOSPITAL ENCOUNTER (OUTPATIENT)
Dept: WOUND CARE | Age: 53
Discharge: HOME | End: 2018-09-07
Payer: MEDICAID

## 2018-09-07 VITALS
DIASTOLIC BLOOD PRESSURE: 79 MMHG | SYSTOLIC BLOOD PRESSURE: 131 MMHG | HEART RATE: 79 BPM | RESPIRATION RATE: 18 BRPM | TEMPERATURE: 97.7 F

## 2018-09-07 DIAGNOSIS — L97.919 IDIOPATHIC CHRONIC VENOUS HYPERTENSION OF RIGHT LOWER EXTREMITY WITH ULCER (HCC): ICD-10-CM

## 2018-09-07 DIAGNOSIS — I82.220 THROMBOSIS OF INFERIOR VENA CAVA (HCC): ICD-10-CM

## 2018-09-07 DIAGNOSIS — I87.311 IDIOPATHIC CHRONIC VENOUS HYPERTENSION OF RIGHT LOWER EXTREMITY WITH ULCER (HCC): ICD-10-CM

## 2018-09-07 DIAGNOSIS — I82.423 THROMBOSIS OF BOTH ILIAC VEINS (HCC): ICD-10-CM

## 2018-09-07 DIAGNOSIS — Z79.01 CHRONIC ANTICOAGULATION: ICD-10-CM

## 2018-09-07 DIAGNOSIS — I89.0 CHRONIC ACQUIRED LYMPHEDEMA: Primary | ICD-10-CM

## 2018-09-07 PROCEDURE — 97597 DBRDMT OPN WND 1ST 20 CM/<: CPT | Performed by: SPECIALIST

## 2018-09-07 PROCEDURE — 97597 DBRDMT OPN WND 1ST 20 CM/<: CPT

## 2018-09-07 RX ORDER — LIDOCAINE HYDROCHLORIDE 40 MG/ML
2.5 SOLUTION TOPICAL ONCE
Status: DISCONTINUED | OUTPATIENT
Start: 2018-09-07 | End: 2018-09-08 | Stop reason: HOSPADM

## 2018-09-07 ASSESSMENT — PAIN DESCRIPTION - LOCATION: LOCATION: LEG

## 2018-09-07 ASSESSMENT — PAIN DESCRIPTION - PAIN TYPE: TYPE: ACUTE PAIN

## 2018-09-07 ASSESSMENT — PAIN DESCRIPTION - DESCRIPTORS: DESCRIPTORS: BURNING

## 2018-09-07 ASSESSMENT — PAIN DESCRIPTION - PROGRESSION: CLINICAL_PROGRESSION: GRADUALLY IMPROVING

## 2018-09-07 ASSESSMENT — PAIN DESCRIPTION - ORIENTATION: ORIENTATION: RIGHT

## 2018-09-07 ASSESSMENT — PAIN DESCRIPTION - ONSET: ONSET: GRADUAL

## 2018-09-07 ASSESSMENT — PAIN DESCRIPTION - FREQUENCY: FREQUENCY: CONTINUOUS

## 2018-09-07 NOTE — PROGRESS NOTES
Orders and Discharge Instructions  [facility] [address] [id number] [place] [address]  Telephone: [phone]    NAME:  Karlos Denise  YOB: 1965  MEDICAL RECORD NUMBER:  [id number]  DATE:  9/7/2018    Wash hands with soap and water prior to and after every dressing change. Wound Cleansing:   · Do not scrub or use excessive force. · With each dressing change, rinse wounds with 0.9% Saline. (May use wound wash or soft contact solution. Both can be purchased at a local drug store). · If unable to obtain saline, may use a gentle soap and water. · Keep wounds dry in the shower unless otherwise instructed by the physician. Topical Treatments:  Do not apply lotions, creams, or ointments to wound bed unless directed. [] Apply moisturizing lotion to skin surrounding the wound prior to dressing change.  [] Apply antifungal ointment to skin surrounding the wound prior to dressing change.  [] Apply thin film of moisture barrier ointment to skin immediately around wound. [] Other:        Dressings:           Wound Location: right lower leg wounds     [x] Apply Primary Dressing to wound:       [] Foam/Foam with Border(i.e Mepilex) [] Non-adherent (i.e.Mepitel)   [] Alginate with Silver(i.e. Silvercel) [] Alginate   [x] Collagen(i.e. Puracol) [] Collagen with Silver(i.e. Candice)   [] Hydrocolloid  [] Hydrafera Blue moistened with saline   [] MediHoney Paste/Gel [] Hydrogel      [] Santyl covered with gauze moisten with saline    [] Bactroban/Mupirocin [] Polysporin/Neosporin  [] Other:      Pack wound loosely with: [] Iodoform   [] Plain Packing  []Saline \"wet to dry\"     [x] Cover and Secure with:     [x] Dry Gauze [] ABD [] Cast padding [x] Kerlix or Lyndsey rolled gauze   [] Coban [] Ace Wrap  [] Ace Wrap from forefoot to just below the knee  [] Paper Tape  [] Medipore Tape   [] Other:    Avoid contact of tape with skin if possible.     [x]

## 2018-09-11 ENCOUNTER — HOSPITAL ENCOUNTER (OUTPATIENT)
Dept: WOUND CARE | Age: 53
Discharge: HOME | End: 2018-09-11
Payer: MEDICAID

## 2018-09-11 VITALS
TEMPERATURE: 98 F | HEART RATE: 89 BPM | DIASTOLIC BLOOD PRESSURE: 84 MMHG | SYSTOLIC BLOOD PRESSURE: 150 MMHG | RESPIRATION RATE: 16 BRPM

## 2018-09-11 PROCEDURE — 29581 APPL MULTLAYER CMPRN SYS LEG: CPT

## 2018-09-11 ASSESSMENT — PAIN DESCRIPTION - ORIENTATION: ORIENTATION: RIGHT

## 2018-09-11 ASSESSMENT — PAIN DESCRIPTION - DESCRIPTORS: DESCRIPTORS: BURNING

## 2018-09-11 ASSESSMENT — PAIN DESCRIPTION - PAIN TYPE: TYPE: ACUTE PAIN

## 2018-09-11 ASSESSMENT — PAIN DESCRIPTION - LOCATION: LOCATION: LEG

## 2018-09-11 ASSESSMENT — PAIN DESCRIPTION - ONSET: ONSET: GRADUAL

## 2018-09-11 ASSESSMENT — PAIN DESCRIPTION - FREQUENCY: FREQUENCY: CONTINUOUS

## 2018-09-11 ASSESSMENT — PAIN SCALES - GENERAL: PAINLEVEL_OUTOF10: 2

## 2018-09-11 ASSESSMENT — PAIN DESCRIPTION - PROGRESSION: CLINICAL_PROGRESSION: GRADUALLY IMPROVING

## 2018-09-11 NOTE — PROGRESS NOTES
Multilayer Compression Wrap   (Not Unna) Below the Knee    NAME:  Monika Coe  YOB: 1965  MEDICAL RECORD NUMBER:  [id number]  DATE:  9/11/2018       [] Removed old Multilayer wrap if present and washed leg with mild soap/water.  [x] Applied moisturizing agent to dry skin as needed.  [x] Applied primary and secondary dressing as ordered     [x] Applied multilayered dressing below the knee to Right lower leg(s)  (Profore 4 Layer Wrap) per 's instructions.  [x] Instructed patient/caregiver not to remove dressing and to keep it clean and dry.  [x] Instructed patient/caregiver on complications to report to provider, such as pain, numbness in toes, heavy drainage, and slippage of dressing.  [x] Instructed patient on purpose of compression dressing and on activity and exercise recommendations.        Applied per   Guidelines    Electronically signed by Kevin Parr RN on 9/11/2018 at 8:43 AM

## 2018-09-14 ENCOUNTER — HOSPITAL ENCOUNTER (OUTPATIENT)
Dept: WOUND CARE | Age: 53
Discharge: HOME | End: 2018-09-14
Payer: MEDICAID

## 2018-09-14 VITALS
HEART RATE: 81 BPM | DIASTOLIC BLOOD PRESSURE: 84 MMHG | RESPIRATION RATE: 18 BRPM | SYSTOLIC BLOOD PRESSURE: 125 MMHG | TEMPERATURE: 98.2 F

## 2018-09-14 DIAGNOSIS — L97.919 IDIOPATHIC CHRONIC VENOUS HYPERTENSION OF RIGHT LOWER EXTREMITY WITH ULCER (HCC): ICD-10-CM

## 2018-09-14 DIAGNOSIS — I82.220 THROMBOSIS OF INFERIOR VENA CAVA (HCC): ICD-10-CM

## 2018-09-14 DIAGNOSIS — I89.0 CHRONIC ACQUIRED LYMPHEDEMA: Primary | ICD-10-CM

## 2018-09-14 DIAGNOSIS — Z79.01 CHRONIC ANTICOAGULATION: ICD-10-CM

## 2018-09-14 DIAGNOSIS — I82.423 THROMBOSIS OF BOTH ILIAC VEINS (HCC): ICD-10-CM

## 2018-09-14 DIAGNOSIS — I87.311 IDIOPATHIC CHRONIC VENOUS HYPERTENSION OF RIGHT LOWER EXTREMITY WITH ULCER (HCC): ICD-10-CM

## 2018-09-14 DIAGNOSIS — R60.0 LOCALIZED EDEMA: ICD-10-CM

## 2018-09-14 PROCEDURE — 97597 DBRDMT OPN WND 1ST 20 CM/<: CPT

## 2018-09-14 PROCEDURE — 6370000000 HC RX 637 (ALT 250 FOR IP): Performed by: SPECIALIST

## 2018-09-14 PROCEDURE — 29581 APPL MULTLAYER CMPRN SYS LEG: CPT

## 2018-09-14 PROCEDURE — 97597 DBRDMT OPN WND 1ST 20 CM/<: CPT | Performed by: SPECIALIST

## 2018-09-14 RX ORDER — LIDOCAINE HYDROCHLORIDE 40 MG/ML
2.5 SOLUTION TOPICAL ONCE
Status: COMPLETED | OUTPATIENT
Start: 2018-09-14 | End: 2018-09-14

## 2018-09-14 RX ADMIN — LIDOCAINE HYDROCHLORIDE 2.5 ML: 40 SOLUTION TOPICAL at 08:26

## 2018-09-14 ASSESSMENT — PAIN DESCRIPTION - DESCRIPTORS: DESCRIPTORS: BURNING

## 2018-09-14 ASSESSMENT — PAIN DESCRIPTION - LOCATION: LOCATION: LEG

## 2018-09-14 ASSESSMENT — PAIN SCALES - GENERAL: PAINLEVEL_OUTOF10: 2

## 2018-09-14 ASSESSMENT — PAIN DESCRIPTION - PAIN TYPE: TYPE: ACUTE PAIN;CHRONIC PAIN

## 2018-09-14 ASSESSMENT — PAIN DESCRIPTION - ORIENTATION: ORIENTATION: RIGHT

## 2018-09-14 NOTE — PROGRESS NOTES
4% Lidocaine Liquid Topical     Debridement:Excisional Debridement    Using curette the wound was sharply debrided    down through and including the removal of  epidermis and dermis. Devitalized Tissue Debrided:  fibrin      Pre Debridement Measurements:  Are located in the Wound Documentation Flow Sheet   Wound #: 6     Post  Debridement Measurements:  Wound 05/14/18 #5 right lower medial leg venous; lymphedema ( had since 1/2016) (Active)   Wound Image   7/25/2018  9:22 AM   Wound Type Wound 9/14/2018  8:23 AM   Wound Venous 9/14/2018  8:23 AM   Dressing/Treatment Other (Comment) 9/11/2018  8:17 AM   Wound Cleansed Rinsed/Irrigated with saline 9/14/2018  8:23 AM   Wound Length (cm) 3 cm 9/14/2018  8:23 AM   Wound Width (cm) 3.7 cm 9/14/2018  8:23 AM   Wound Depth (cm)  0.1 9/14/2018  8:23 AM   Calculated Wound Size (cm^2) (l*w) 11.1 cm^2 9/14/2018  8:23 AM   Change in Wound Size % (l*w) 71.97 9/14/2018  8:23 AM   Distance Tunneling (cm) 0 cm 8/24/2018  9:17 AM   Undermining Maxium Distance (cm) 0 8/24/2018  9:17 AM   Wound Assessment Red;Yellow 9/14/2018  8:23 AM   Drainage Amount Moderate 9/14/2018  8:23 AM   Drainage Description Yellow 9/14/2018  8:23 AM   Odor Mild 9/14/2018  8:23 AM   Margins Defined edges 9/14/2018  8:23 AM   Kiah-wound Assessment Pink; Tan 9/14/2018  8:23 AM   Non-staged Wound Description Full thickness 9/14/2018  8:23 AM   [place]%Wound Bed 10 8/10/2018  9:49 AM   Red%Wound Bed 90 9/14/2018  8:23 AM   Yellow%Wound Bed 10 9/14/2018  8:23 AM   Purple%Wound Bed 10 8/10/2018  9:49 AM   Other%Wound Bed gray 5% 8/24/2018  9:17 AM   Op First Treatment Date 05/14/18 6/4/2018  9:36 AM   Number of days: 122       Wound 05/14/18 #6 right lower leg posterior venous/ lymphedema  (Active)   Wound Image   7/25/2018  9:22 AM   Wound Type Wound 9/14/2018  8:23 AM   Wound Venous 9/14/2018  8:23 AM   Dressing/Treatment Other (Comment) 9/14/2018  8:23 AM   Wound Cleansed Rinsed/Irrigated with saline 9/14/2018 week(s)    Discharge [facility] Physician Orders and Discharge Instructions  The Carissa Natarajan [address] [facility], [facility] [address] [facility]  Telephone: [phone]    NAME:  Karlos Denise  YOB: 1965  MEDICAL RECORD NUMBER:  [id number]  DATE:  9/14/2018    Wash hands with soap and water prior to and after every dressing change. Wound Cleansing:   · Do not scrub or use excessive force. · With each dressing change, rinse wounds with 0.9% Saline. (May use wound wash or soft contact solution. Both can be purchased at a local drug store). · If unable to obtain saline, may use a gentle soap and water. · Keep wounds dry in the shower unless otherwise instructed by the physician. Topical Treatments:  Do not apply lotions, creams, or ointments to wound bed unless directed. [] Apply moisturizing lotion to skin surrounding the wound prior to dressing change.  [] Apply antifungal ointment to skin surrounding the wound prior to dressing change.  [] Apply thin film of moisture barrier ointment to skin immediately around wound.   [] Other:        Dressings:           Wound Location: right lower leg wounds     [x] Apply Primary Dressing to wound:       [] Foam/Foam with Border(i.e Mepilex) [] Non-adherent (i.e.Mepitel)   [x] Alginate with Silver(i.e. Silvercel) [] Alginate   [] Collagen(i.e. Puracol) [] Collagen with Silver(i.e. Candice)   [] Hydrocolloid  [] Hydrafera Blue moistened with saline   [] MediHoney Paste/Gel [] Hydrogel      [] Santyl covered with gauze moisten with saline    [] Bactroban/Mupirocin [] Polysporin/Neosporin  [] Other:      Pack wound loosely with: [] Iodoform   [] Plain Packing  []Saline \"wet to dry\"     [x] Cover and Secure with:     [x] Dry Gauze [] ABD [] Cast padding [] Kerlix or Lyndsey rolled gauze   [] Coban [] Ace Wrap  [] Ace Wrap from forefoot to just below the knee  [] Paper Tape  [] Medipore Tape   [] Other:    Avoid contact of tape with skin if possible. [x] Change dressing:  [] Daily    [] Every Other Day [] Three times per week   [] Once a week [x] Do Not Change Dressing   [] Other:         Edema Control:  Apply: [x] Compression Stocking []Right Leg [x]Left Leg     [] Double Layer Medigrip/Single Layer Spandagrip       []Right Leg  []Left Leg        []Low compression 5-10 mm/Hg      []Medium compression 10-20 mm/Hg     []High compression  20-30 mm/Hg    Apply every morning immediately when getting up. They should be applied to affected leg(s) from mid foot to knee making sure to cover the heel. Remove every night before going to bed. [x] Elevate leg(s) above the level of the heart for 30 minutes 4-5 times a day and/or when sitting. [x] Avoid prolonged standing in one place. Lymphedema Therapy:   [x] Wear Lymphedema pumps twice a day in 1 hour increments  at settings: as at home 40mmHG      [x]Elevate leg(s) above the level of the heart for 30 minutes 4-5 times a day and/or when sitting. [x] Avoid prolonged standing in one place. Multilayer Compression Wrap:  Type: Profore 4 Layer Wrap      Applied in Clinic to the :  Right lower leg(s)     · Do not get leg(s) with wrap wet. ·  If wraps become too tight call the center or completely remove the wrap. · Elevate leg(s) above the level of the heart when sitting. · Avoid prolonged standing in one place. [] [address] [facility] remove wrap, cleanse affected leg(s) with soap and water, apply wound dressings as ordered above and reapply compression wraps on:     Dietary:  Important dietary reminders:  1. Increase Protein intake (i.e. Lean meats, fish, eggs, legumes, and yogurt)  2. No added salt  3. If diabetic, follow a diabetic diet and check glucose prior to meals or as instructed by your physician.         Return Appointment:  [] Wound and dressing supply provider:   [] ECF or Home Healthcare:  [] Nurse visit: [x] Return Appointment: With Dr. Lizet Hutton  Next Wednesday 09/19/18      Your nurse  is:  Leatha Esparza     Electronically signed by Montrell Vera RN on 9/14/2018 at 8:39 AM     [address] [facility] Information: Should you experience any significant changes in your wound(s) or have questions about your wound care, please contact the [address] [place] [address] at [phone] Monday-Friday from 8:00 am - 4:30 pm except for Wednesdays which hours are from 8:00 am - 2:00 pm.   If you need help with your wound outside these hours and cannot wait until we are again available, contact your PCP or go to the hospital emergency room. PLEASE NOTE: IF YOU ARE UNABLE TO OBTAIN WOUND SUPPLIES, CONTINUE TO USE THE SUPPLIES YOU HAVE AVAILABLE UNTIL YOU ARE ABLE TO REACH US. IT IS MOST IMPORTANT TO KEEP THE WOUND COVERED AT ALL TIMES. Physician orders by:     [] Dr Alexsandra Castro [] Dr Larissa Hogue    [] Dr Manuela Fleischer     [] Dr Ashley Quintanilla   [x] Dr George Howard  [] Dr Aakash Mott  [] Dr Dominick Rodriguez       Physician Signature:__________________________________        The information contained in the After Visit Summary has been reviewed with me, the patient and/or responsible adult, by my health care provider(s). I had the opportunity to ask questions regarding this information.   I have elected to receive;      [] Patient unable to sign Discharge Instructions given to ECF/Transportation/POA        Electronically signed by Kathrine Alarcon MD on 9/14/2018 at 9:09 AM

## 2018-09-19 ENCOUNTER — HOSPITAL ENCOUNTER (OUTPATIENT)
Dept: WOUND CARE | Age: 53
Discharge: HOME | End: 2018-09-19
Payer: MEDICAID

## 2018-09-19 VITALS
HEART RATE: 64 BPM | DIASTOLIC BLOOD PRESSURE: 76 MMHG | RESPIRATION RATE: 18 BRPM | TEMPERATURE: 98.1 F | SYSTOLIC BLOOD PRESSURE: 121 MMHG

## 2018-09-19 DIAGNOSIS — I82.423 THROMBOSIS OF BOTH ILIAC VEINS (HCC): ICD-10-CM

## 2018-09-19 DIAGNOSIS — Z79.01 CHRONIC ANTICOAGULATION: ICD-10-CM

## 2018-09-19 DIAGNOSIS — I87.311 IDIOPATHIC CHRONIC VENOUS HYPERTENSION OF RIGHT LOWER EXTREMITY WITH ULCER (HCC): Primary | ICD-10-CM

## 2018-09-19 DIAGNOSIS — I82.220 THROMBOSIS OF INFERIOR VENA CAVA (HCC): ICD-10-CM

## 2018-09-19 DIAGNOSIS — L97.919 IDIOPATHIC CHRONIC VENOUS HYPERTENSION OF RIGHT LOWER EXTREMITY WITH ULCER (HCC): Primary | ICD-10-CM

## 2018-09-19 PROCEDURE — 97597 DBRDMT OPN WND 1ST 20 CM/<: CPT

## 2018-09-19 PROCEDURE — 29581 APPL MULTLAYER CMPRN SYS LEG: CPT

## 2018-09-19 PROCEDURE — 97597 DBRDMT OPN WND 1ST 20 CM/<: CPT | Performed by: SPECIALIST

## 2018-09-19 PROCEDURE — 6370000000 HC RX 637 (ALT 250 FOR IP): Performed by: SPECIALIST

## 2018-09-19 RX ORDER — LIDOCAINE HYDROCHLORIDE 40 MG/ML
2.5 SOLUTION TOPICAL ONCE
Status: COMPLETED | OUTPATIENT
Start: 2018-09-19 | End: 2018-09-19

## 2018-09-19 RX ADMIN — LIDOCAINE HYDROCHLORIDE 2.5 ML: 40 SOLUTION TOPICAL at 09:31

## 2018-09-19 ASSESSMENT — PAIN DESCRIPTION - LOCATION: LOCATION: LEG

## 2018-09-19 ASSESSMENT — PAIN DESCRIPTION - ORIENTATION: ORIENTATION: RIGHT

## 2018-09-19 ASSESSMENT — PAIN SCALES - GENERAL: PAINLEVEL_OUTOF10: 1

## 2018-09-19 ASSESSMENT — PAIN DESCRIPTION - PAIN TYPE: TYPE: CHRONIC PAIN

## 2018-09-19 ASSESSMENT — PAIN DESCRIPTION - PROGRESSION: CLINICAL_PROGRESSION: GRADUALLY IMPROVING

## 2018-09-19 ASSESSMENT — PAIN DESCRIPTION - FREQUENCY: FREQUENCY: INTERMITTENT

## 2018-09-19 ASSESSMENT — PAIN DESCRIPTION - ONSET: ONSET: GRADUAL

## 2018-09-19 ASSESSMENT — PAIN DESCRIPTION - DESCRIPTORS: DESCRIPTORS: ACHING

## 2018-09-19 NOTE — PROGRESS NOTES
Yes    Pain Control: Anesthetic: 4% Lidocaine Liquid Topical     Debridement:Excisional Debridement    Using curette the wound was sharply debrided    down through and including the removal of  epidermis and dermis. Devitalized Tissue Debrided:  fibrin      Pre Debridement Measurements:  Are located in the Wound Documentation Flow Sheet   Wound #: 6     Post  Debridement Measurements:  Wound 05/14/18 #5 right lower medial leg venous; lymphedema ( had since 1/2016) (Active)   Wound Image   9/19/2018  9:19 AM   Wound Type Wound 9/19/2018  9:19 AM   Wound Venous 9/19/2018  9:19 AM   Dressing/Treatment Other (Comment) 9/19/2018 10:13 AM   Wound Cleansed Rinsed/Irrigated with saline 9/19/2018  9:19 AM   Wound Length (cm) 3 cm 9/19/2018  9:19 AM   Wound Width (cm) 3.5 cm 9/19/2018  9:19 AM   Wound Depth (cm)  0.1 9/19/2018  9:19 AM   Calculated Wound Size (cm^2) (l*w) 10.5 cm^2 9/19/2018  9:19 AM   Change in Wound Size % (l*w) 73.48 9/19/2018  9:19 AM   Distance Tunneling (cm) 0 cm 9/19/2018  9:19 AM   Undermining Maxium Distance (cm) 0 9/19/2018  9:19 AM   Wound Assessment Yellow;Red;[place] 9/19/2018  9:19 AM   Drainage Amount Small 9/19/2018  9:19 AM   Drainage Description Serosanguinous; Yellow 9/19/2018  9:19 AM   Odor Mild 9/19/2018  9:19 AM   Margins Defined edges 9/19/2018  9:19 AM   Kiah-wound Assessment Dry;Pink 9/19/2018  9:19 AM   Non-staged Wound Description Full thickness 9/19/2018  9:19 AM   [place]%Wound Bed 0 9/19/2018  9:19 AM   Red%Wound Bed 80 9/19/2018  9:19 AM   Yellow%Wound Bed 20 9/19/2018  9:19 AM   Purple%Wound Bed 0 9/19/2018  9:19 AM   Other%Wound Bed 0 9/19/2018  9:19 AM   Op First Treatment Date 05/14/18 6/4/2018  9:36 AM   Number of days: 128       Wound 05/14/18 #6 right lower leg posterior venous/ lymphedema  (Active)   Wound Image   9/19/2018  9:19 AM   Wound Type Wound 9/19/2018  9:19 AM   Wound Venous 9/19/2018  9:19 AM   Dressing/Treatment Other (Comment) 9/19/2018 10:13 AM   Wound center in:  1 week(s)    Discharge [facility] Physician Orders and Discharge Instructions  The [facility] S[facility] Delgado [address]   [place], [address]  Telephone: [phone]    NAME:  Meenakshi Rubio  YOB: 1965  MEDICAL RECORD NUMBER:  [id number]  DATE:  9/19/2018    Wash hands with soap and water prior to and after every dressing change. Wound Cleansing:   · Do not scrub or use excessive force. · With each dressing change, rinse wounds with 0.9% Saline. (May use wound wash or soft contact solution. Both can be purchased at a local drug store). · If unable to obtain saline, may use a gentle soap and water. · Keep wounds dry in the shower unless otherwise instructed by the physician. Topical Treatments:  Do not apply lotions, creams, or ointments to wound bed unless directed. [] Apply moisturizing lotion to skin surrounding the wound prior to dressing change.  [] Apply antifungal ointment to skin surrounding the wound prior to dressing change.  [] Apply thin film of moisture barrier ointment to skin immediately around wound.   [] Other:      Dressings:           Wound Location: right lower leg wounds     [x] Apply Primary Dressing to wound:       [] Foam/Foam with Border(i.e Mepilex) [] Non-adherent (i.e.Mepitel)   [] Alginate with Silver (i.e. Silvercel)   [] Alginate   [] Collagen (i.e. Puracol) [x] Collagen with Silver(i.e. Candice)     [] Hydrocolloid  [] Hydrafera Blue moistened with saline   [] MediHoney Paste/Gel [] Hydrogel   [] Endoform      [] Santyl covered with gauze moisten with saline    [] Bactroban/Mupirocin [] Polysporin/Neosporin  [] Other:      Pack wound loosely with: [] Iodoform   [] Plain Packing  [] Saline \"wet to dry\"      [x] Cover and Secure with:     [x] Dry Gauze [] ABD [] Cast padding [] Kerlix or Lyndsey rolled gauze   [] Coban [] Ace Wrap  [] Ace Wrap from forefoot to just below the knee  [] Paper Tape  [] Medipore Tape   [] Other:    Avoid contact of tape with skin if possible. [x] Change dressing: [] Daily    [] Every Other Day [] Three times per week   [] Once a week [x] Do Not Change Dressing   [] Other       Edema Control:  Apply: [x] Compression Stocking []Right Leg [x]Left Leg     [] Double Layer Medigrip/Single Layer Spandagrip       []Right Leg  []Left Leg        []Low compression 5-10 mm/Hg      []Medium compression 10-20 mm/Hg     []High compression  20-30 mm/Hg    Apply every morning immediately when getting up. They should be applied to affected leg(s) from mid foot to knee making sure to cover the heel. Remove every night before going to bed. [x] Elevate leg(s) above the level of the heart for 30 minutes 4-5 times a day and/or when sitting. [x] Avoid prolonged standing in one place. Lymphedema Therapy:   [x] Wear Lymphedema pumps twice a day in 1 hour at settings: 40      [x]Elevate leg(s) above the level of the heart for 30 minutes 4-5 times a day and/or when sitting. [x] Avoid prolonged standing in one place. Multilayer Compression Wrap:  Type: Profore 4 Layer Wrap      Applied in Clinic to the :  Right lower leg(s)     · Do not get leg(s) with wrap wet. ·  If wraps become too tight call the center or completely remove the wrap. · Elevate leg(s) above the level of the heart when sitting. · Avoid prolonged standing in one place.       [] [facility] remove wrap, cleanse affected leg(s) with soap and water, apply wound dressings as ordered above and reapply compression wraps on:         Return Appointment:  [] Wound and dressing supply provider:   [] ECF or Home Healthcare:  [] Nurse visit:    [x] Return Appointment: With Dr. Bong Pedraza   in  [address] [place] [address])      Your nurse  is: Noemí Fuchs     Electronically signed by Ector Thorpe RN on 9/19/2018 at 9:42 AM     [facility] [address]: Should you experience any significant changes in your wound(s) or have questions about your wound care, please contact the [address] [place] [address] at [phone] Monday-Friday from 8:00 am - 4:30 pm except for Wednesdays which hours are from 8:00 am - 2:00 pm.   If you need help with your wound outside these hours and cannot wait until we are again available, contact your PCP or go to the hospital emergency room. PLEASE NOTE: IF YOU ARE UNABLE TO OBTAIN WOUND SUPPLIES, CONTINUE TO USE THE SUPPLIES YOU HAVE AVAILABLE UNTIL YOU ARE ABLE TO REACH US. IT IS MOST IMPORTANT TO KEEP THE WOUND COVERED AT ALL TIMES. Physician orders by:     [] Dr Galdino Hardin [] Dr Ney Villalba    [] Dr Orlando Lima     [] Dr Ray Crawford   [x] Dr Martina Perry  [] Dr Kavita Eden  [] Dr Tierney Bautista       Physician Signature:__________________________________        The information contained in the After Visit Summary has been reviewed with me, the patient and/or responsible adult, by my health care provider(s). I had the opportunity to ask questions regarding this information.   I have elected to receive;      [] Patient unable to sign Discharge Instructions given to ECF/Transportation/POA        Electronically signed by Coreen Elkins MD on 9/19/2018 at 11:53 AM

## 2018-09-26 ENCOUNTER — HOSPITAL ENCOUNTER (OUTPATIENT)
Dept: WOUND CARE | Age: 53
Discharge: HOME | End: 2018-09-26
Payer: MEDICAID

## 2018-09-26 VITALS
RESPIRATION RATE: 18 BRPM | SYSTOLIC BLOOD PRESSURE: 144 MMHG | DIASTOLIC BLOOD PRESSURE: 83 MMHG | TEMPERATURE: 98.1 F | HEART RATE: 81 BPM

## 2018-09-26 DIAGNOSIS — L97.919 IDIOPATHIC CHRONIC VENOUS HYPERTENSION OF RIGHT LOWER EXTREMITY WITH ULCER (HCC): ICD-10-CM

## 2018-09-26 DIAGNOSIS — Z79.01 CHRONIC ANTICOAGULATION: ICD-10-CM

## 2018-09-26 DIAGNOSIS — I89.0 CHRONIC ACQUIRED LYMPHEDEMA: Primary | ICD-10-CM

## 2018-09-26 DIAGNOSIS — R60.0 LOCALIZED EDEMA: ICD-10-CM

## 2018-09-26 DIAGNOSIS — I82.423 THROMBOSIS OF BOTH ILIAC VEINS (HCC): ICD-10-CM

## 2018-09-26 DIAGNOSIS — I87.311 IDIOPATHIC CHRONIC VENOUS HYPERTENSION OF RIGHT LOWER EXTREMITY WITH ULCER (HCC): ICD-10-CM

## 2018-09-26 PROCEDURE — 6370000000 HC RX 637 (ALT 250 FOR IP): Performed by: SPECIALIST

## 2018-09-26 PROCEDURE — 97597 DBRDMT OPN WND 1ST 20 CM/<: CPT

## 2018-09-26 PROCEDURE — 29581 APPL MULTLAYER CMPRN SYS LEG: CPT

## 2018-09-26 PROCEDURE — 97597 DBRDMT OPN WND 1ST 20 CM/<: CPT | Performed by: SPECIALIST

## 2018-09-26 RX ORDER — LIDOCAINE HYDROCHLORIDE 40 MG/ML
2.5 SOLUTION TOPICAL ONCE
Status: COMPLETED | OUTPATIENT
Start: 2018-09-26 | End: 2018-09-26

## 2018-09-26 RX ADMIN — LIDOCAINE HYDROCHLORIDE 2.5 ML: 40 SOLUTION TOPICAL at 10:19

## 2018-09-26 ASSESSMENT — PAIN DESCRIPTION - LOCATION: LOCATION: LEG

## 2018-09-26 ASSESSMENT — PAIN SCALES - GENERAL: PAINLEVEL_OUTOF10: 1

## 2018-09-26 ASSESSMENT — PAIN DESCRIPTION - ORIENTATION: ORIENTATION: RIGHT

## 2018-09-26 ASSESSMENT — PAIN DESCRIPTION - PAIN TYPE: TYPE: CHRONIC PAIN

## 2018-09-26 NOTE — PROGRESS NOTES
Multilayer Compression Wrap   (Not Unna) Below the Knee    NAME:  Yuliana Liz  YOB: 1965  MEDICAL RECORD NUMBER:  [id number]  DATE:  9/26/2018       [x] Removed old Multilayer wrap if present and washed leg with mild soap/water.  [x] Applied moisturizing agent to dry skin as needed.  [x] Applied primary and secondary dressing as ordered     [x] Applied multilayered dressing below the knee to Right lower leg(s)  (4 Layer Compression Wrap) per 's instructions.  [x] Instructed patient/caregiver not to remove dressing and to keep it clean and dry.  [x] Instructed patient/caregiver on complications to report to provider, such as pain, numbness in toes, heavy drainage, and slippage of dressing.  [x] Instructed patient on purpose of compression dressing and on activity and exercise recommendations.        Applied per   Guidelines    Electronically signed by Siva Oviedo RN on 9/26/2018 at 10:43 AM

## 2018-09-26 NOTE — PROGRESS NOTES
1227 [facility]  Progress Note and Procedure Note      Prema Reddy  MEDICAL RECORD NUMBER:  [id number]  AGE: 48 y.o. GENDER: male  : 1965  EPISODE DATE:  2018    Subjective:     Chief Complaint   Patient presents with    Wound Check     F/U RIGHT LOWER LEG         HISTORY of PRESENT ILLNESS HPI     Prema Reddy is a 48 y.o. male who presents today for wound/ulcer evaluation. 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 Seen at [address] [place] [address] where unsuccessful dilatation of IVC was done. He has been grafted with compression wraps and lymphedema pumps with near complete healing. However, he has gone back to work and is on his feet causing partial loss of graft. He now attempted to wear his compression stockings but he states they are difficult to wear. He remains working 6 days a week and he on his feet. Numerous attempts to encourage lifestyle with work are unsuccessful. Pain Assessment:  Wound/Ulcer Pain Timing/Severity: none  Quality of pain: N/A  Severity:  0 / 10   Modifying Factors: None  Associated Signs/Symptoms: none    Ulcer Identification:  Ulcer Type: venous  Contributing Factors: edema, venous stasis, lymphedema, diabetes, obesity and anticoagulation therapy    Objective:      BP (!) 144/83   Pulse 81   Temp 98.1 °F (36.7 °C) (Oral)   Resp 18     Wt Readings from Last 3 Encounters:   18 242 lb 8.1 oz (110 kg)   18 238 lb 1.6 oz (108 kg)   18 238 lb (108 kg)       PHYSICAL EXAM    Extremities: no cyanosis, no clubbing and varicose veins noted-  bilateral 2+ nonpitting edema with several granulating  Wounds RLE see measurements    Assessment:      No diagnosis found. Procedure Note  Indications:  Based on my examination of this patient's wound(s) today, sharp excision is required to promote healing and evaluate the extent healing. Performed by: Kathrine Alarcon MD    Consent obtained? Yes    Time out taken: Yes    Pain Control: Anesthetic: 4% Lidocaine Liquid Topical     Debridement:Excisional Debridement    Using curette the wound was sharply debrided    down through and including the removal of  Epidermis, dermis    Devitalized Tissue Debrided:  fibrin      Pre Debridement Measurements:  Are located in the Wound Documentation Flow Sheet   Wound #: 6     Post  Debridement Measurements:  Wound 05/14/18 #5 right lower medial leg venous; lymphedema ( had since 1/2016) (Active)   Wound Image   9/19/2018  9:19 AM   Wound Type Wound 9/26/2018 10:12 AM   Wound Venous 9/26/2018 10:12 AM   Dressing/Treatment Other (Comment) 9/26/2018 10:43 AM   Wound Cleansed Rinsed/Irrigated with saline 9/26/2018 10:12 AM   Wound Length (cm) 3.2 cm 9/26/2018 10:12 AM   Wound Width (cm) 3.7 cm 9/26/2018 10:12 AM   Wound Depth (cm)  0.1 9/26/2018 10:12 AM   Calculated Wound Size (cm^2) (l*w) 11.84 cm^2 9/26/2018 10:12 AM   Change in Wound Size % (l*w) 70.1 9/26/2018 10:12 AM   Distance Tunneling (cm) 0 cm 9/26/2018 10:12 AM   Undermining Maxium Distance (cm) 0 9/26/2018 10:12 AM   Wound Assessment Yellow;Red;Slough 9/26/2018 10:12 AM   Drainage Amount Small 9/26/2018 10:12 AM   Drainage Description Serosanguinous; Yellow 9/26/2018 10:12 AM   Odor Mild 9/26/2018 10:12 AM   Margins Defined edges 9/26/2018 10:12 AM   Kiah-wound Assessment Dry;Pink 9/26/2018 10:12 AM   Non-staged Wound Description Full thickness 9/26/2018 10:12 AM   [place]%Wound Bed 0 9/19/2018  9:19 AM   Red%Wound Bed 80 9/26/2018 10:12 AM   Yellow%Wound Bed 20 9/26/2018 10:12 AM   Purple%Wound Bed 0 9/19/2018  9:19 AM   Other%Wound Bed 0 9/19/2018  9:19 AM   Op First Treatment Date 05/14/18 6/4/2018  9:36 AM   Number of days: 135       Wound 05/14/18 #6 right lower leg posterior venous/ lymphedema  (Active)   Wound Image   9/19/2018  9:19 AM   Wound Type Wound 9/26/2018 10:12 AM   Wound Venous 9/26/2018 10:12 AM   Dressing/Treatment Other (Comment) 9/26/2018 10:43 AM   Wound Cleansed Rinsed/Irrigated with saline 9/26/2018 10:12 AM   Wound Length (cm) 4.2 cm 9/26/2018 10:12 AM   Wound Width (cm) 3 cm 9/26/2018 10:12 AM   Wound Depth (cm)  0.1 9/26/2018 10:12 AM   Calculated Wound Size (cm^2) (l*w) 12.6 cm^2 9/26/2018 10:12 AM   Change in Wound Size % (l*w) 60.93 9/26/2018 10:12 AM   Distance Tunneling (cm) 0 cm 9/26/2018 10:12 AM   Undermining Maxium Distance (cm) 0 9/26/2018 10:12 AM   Wound Assessment Yellow;Red;Slough 9/26/2018 10:12 AM   Drainage Amount Moderate 9/26/2018 10:12 AM   Drainage Description Serosanguinous; Yellow 9/26/2018 10:12 AM   Odor Mild 9/26/2018 10:12 AM   Margins Defined edges 9/26/2018 10:12 AM   Kiah-wound Assessment Dry;Pink 9/26/2018 10:12 AM   Non-staged Wound Description Full thickness 9/26/2018 10:12 AM   [place]%Wound Bed 0 9/19/2018  9:19 AM   Red%Wound Bed 80 9/26/2018 10:12 AM   Yellow%Wound Bed 20 9/26/2018 10:12 AM   Black%Wound Bed 0 9/14/2018  8:23 AM   Purple%Wound Bed 0 9/19/2018  9:19 AM   Other%Wound Bed 0 9/19/2018  9:19 AM   Op First Treatment Date 05/14/18 6/4/2018  9:36 AM   Number of days: 135       Percent of Wound Debrided: 100%    Total Surface Area Debrided:  12 sq cm    Diabetic/Pressure/Non Pressure Ulcers only:  Ulcer: Non-Pressure ulcer, fat layer exposed    Bleeding: Minimal    Hemostasis Achieved: by pressure    Procedural Pain: 0  / 10     Post Procedural Pain: 0 / 10     Response to treatment:  Well tolerated by patient. Patient agrees to be fitted for zipper 40-50 mmHG compression. Plan:     Treatment Note: Please see attached Discharge Instructions.   These instructions were given and signed by the patient or POA    New Medication(s) at this visit:   New Prescriptions    No medications on file       In my professional opinion this patient would benefit from HBO Therapy: No       Patient is to return to wound care center in:  1 week(s)    Discharge [facility] Physician Orders and Discharge Instructions  The Carissa Natarajan [address] [facility] [place], [facility], [address] [facility]  Telephone: [phone]    NAME:  Yg Batres  YOB: 1965  MEDICAL RECORD NUMBER:  [id number]  DATE:  9/26/2018    Wash hands with soap and water prior to and after every dressing change. Wound Cleansing:   · Do not scrub or use excessive force. · With each dressing change, rinse wounds with 0.9% Saline. (May use wound wash or soft contact solution. Both can be purchased at a local drug store). · If unable to obtain saline, may use a gentle soap and water. · Keep wounds dry in the shower unless otherwise instructed by the physician. Topical Treatments:  Do not apply lotions, creams, or ointments to wound bed unless directed. [] Apply moisturizing lotion to skin surrounding the wound prior to dressing change.  [] Apply antifungal ointment to skin surrounding the wound prior to dressing change.  [] Apply thin film of moisture barrier ointment to skin immediately around wound.   [] Other:      Dressings:           Wound Location: right lower leg wounds     [x] Apply Primary Dressing to wound:       [] Foam/Foam with Border(i.e Mepilex) [] Non-adherent (i.e.Mepitel)   [] Alginate with Silver (i.e. Silvercel)  [] Alginate (i.e. Aquacel)   [] Collagen (i.e. Puracol)   [x] Collagen with Silver(i.e. Candice)     [] Hydrocolloid    [] Hydrafera Blue moistened with saline   [] MediHoney Paste/Gel   [] Hydrogel    [] Endoform      [] Santyl covered with gauze moisten with saline    [] Bactroban/Mupirocin   [] Polysporin/Neosporin  [] Other:      Pack wound loosely with: [] Iodoform   [] Plain Packing  [] Saline \"wet to dry\"      [x] Cover and Secure with:     [x] Dry Gauze  [] ABD [] Cast padding [] Kerlix or Lyndsey rolled gauze   [] Coban [] Ace Wrap  [] Ace Wrap from forefoot to just below the knee  [] Paper Tape [] Medipore Tape  [] Other:    Avoid contact of tape with skin if possible. [x] Change dressing: [] Daily    [] Every Other Day [] Three times per week   [] Once a week [x] Do Not Change Dressing   [] Other:       Edema Control:  Apply: [x] Compression Stocking []Right Leg [x]Left Leg     [] Double Layer Medigrip/Single Layer Spandagrip       []Right Leg  []Left Leg     Strength:       []Low compression 5-10 mm/Hg      []Medium compression 10-20 mm/Hg     []High compression  20-30 mm/Hg    Apply every morning immediately when getting up. They should be applied to affected leg(s) from mid foot to knee making sure to cover the heel. Remove every night before going to bed. [x] Elevate leg(s) above the level of the heart for 30 minutes 4-5 times a day and/or when sitting. [x] Avoid prolonged standing in one place. Lymphedema Therapy:   [x] Wear Lymphedema pumps twice a day at settings prescribed by your physician. [x]Elevate leg(s) above the level of the heart for 30 minutes 4-5 times a day and/or when sitting. [x] Avoid prolonged standing in one place. Multilayer Compression Wrap:  Type: Profore 4 Layer Wrap      Applied in Clinic to the :  Right lower leg(s)     · Do not get leg(s) with wrap wet. ·  If wraps become too tight call the center or completely remove the wrap. · Elevate leg(s) above the level of the heart when sitting. · Avoid prolonged standing in one place. [] [facility] remove wrap, cleanse affected leg(s) with soap and water, apply wound dressings as ordered above and reapply compression wraps on:         Dietary:  Important dietary reminders:  1. Increase Protein intake (i.e. Lean meats, fish, eggs, legumes, and yogurt)  2. No added salt  3. If diabetic, follow a diabetic diet and check glucose prior to meals or as instructed by your physician.         Return Appointment:  [] Wound and dressing supply provider:   [] ECF or Home Healthcare:  [] Nurse visit:    [x] Return Appointment: With  Patricia Hensley  [employer]    Your nurse  is:  Tavo Bello     Electronically signed by Kristen Amaro RN on 9/26/2018 at 10:28 R Aileen Cardoso 8 Information: Should you experience any significant changes in your wound(s) or have questions about your wound care, please contact the [address] [place] [address] at [phone] Monday-Friday from 8:00 am - 4:30 pm except for Wednesdays which hours are from 8:00 am - 2:00 pm.   If you need help with your wound outside these hours and cannot wait until we are again available, contact your PCP or go to the hospital emergency room. PLEASE NOTE: IF YOU ARE UNABLE TO OBTAIN WOUND SUPPLIES, CONTINUE TO USE THE SUPPLIES YOU HAVE AVAILABLE UNTIL YOU ARE ABLE TO REACH US. IT IS MOST IMPORTANT TO KEEP THE WOUND COVERED AT ALL TIMES. Physician orders by:     [] Dr Zak Qureshi [] Dr Any Sidhu    [] Dr Andreea Buchanan     [] Dr Domenico Ellis   [x] Dr Han Nevarez  [] Dr Shannon Bowles  [] Dr Oscar Lares       Physician Signature:__________________________________        The information contained in the After Visit Summary has been reviewed with me, the patient and/or responsible adult, by my health care provider(s). I had the opportunity to ask questions regarding this information.   I have elected to receive;      [] Patient unable to sign Discharge Instructions given to ECF/Transportation/POA        Electronically signed by Reji Dexter MD on 9/26/2018 at 11:32 AM

## 2018-10-01 ENCOUNTER — HOSPITAL ENCOUNTER (OUTPATIENT)
Dept: WOUND CARE | Age: 53
Discharge: HOME | End: 2018-10-01
Payer: MEDICAID

## 2018-10-01 VITALS
SYSTOLIC BLOOD PRESSURE: 145 MMHG | DIASTOLIC BLOOD PRESSURE: 84 MMHG | HEART RATE: 70 BPM | TEMPERATURE: 97.7 F | RESPIRATION RATE: 18 BRPM

## 2018-10-01 DIAGNOSIS — R60.0 LOCALIZED EDEMA: ICD-10-CM

## 2018-10-01 DIAGNOSIS — I82.220 THROMBOSIS OF INFERIOR VENA CAVA (HCC): Primary | ICD-10-CM

## 2018-10-01 DIAGNOSIS — L97.919 IDIOPATHIC CHRONIC VENOUS HYPERTENSION OF RIGHT LOWER EXTREMITY WITH ULCER (HCC): ICD-10-CM

## 2018-10-01 DIAGNOSIS — I89.0 CHRONIC ACQUIRED LYMPHEDEMA: ICD-10-CM

## 2018-10-01 DIAGNOSIS — I87.311 IDIOPATHIC CHRONIC VENOUS HYPERTENSION OF RIGHT LOWER EXTREMITY WITH ULCER (HCC): ICD-10-CM

## 2018-10-01 DIAGNOSIS — I82.423 THROMBOSIS OF BOTH ILIAC VEINS (HCC): ICD-10-CM

## 2018-10-01 PROCEDURE — 97597 DBRDMT OPN WND 1ST 20 CM/<: CPT

## 2018-10-01 PROCEDURE — 6370000000 HC RX 637 (ALT 250 FOR IP): Performed by: SPECIALIST

## 2018-10-01 PROCEDURE — 97597 DBRDMT OPN WND 1ST 20 CM/<: CPT | Performed by: SPECIALIST

## 2018-10-01 PROCEDURE — 29581 APPL MULTLAYER CMPRN SYS LEG: CPT

## 2018-10-01 RX ORDER — LIDOCAINE HYDROCHLORIDE 40 MG/ML
2.5 SOLUTION TOPICAL ONCE
Status: COMPLETED | OUTPATIENT
Start: 2018-10-01 | End: 2018-10-01

## 2018-10-01 RX ADMIN — LIDOCAINE HYDROCHLORIDE 2.5 ML: 40 SOLUTION TOPICAL at 10:00

## 2018-10-01 NOTE — PROGRESS NOTES
1227 [facility]  Progress Note and Procedure Note      Nabil Gordon  MEDICAL RECORD NUMBER:  [id number]  AGE: 48 y.o. GENDER: male  : 1965  EPISODE DATE:  10/1/2018    Subjective:     Chief Complaint   Patient presents with    Wound Check     f/u right lower leg wound         HISTORY of PRESENT ILLNESS HPI     Nabil Gordon is a 48 y.o. male who presents today for wound/ulcer evaluation. 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 Seen at [address] [place] [address] where unsuccessful dilatation of IVC was done. He has been grafted with compression wraps and lymphedema pumps with near complete healing. However, he has gone back to work and is on his feet causing partial loss of graft. He now attempted to wear his compression stockings but he states they are difficult to wear. He remains working 6 days a week and he on his feet. Numerous attempts to encourage lifestyle with work are unsuccessful. Quality of pain: N/A  Severity:  0 / 10   Modifying Factors: None  Associated Signs/Symptoms: none    Ulcer Identification:  Ulcer Type: venous  Contributing Factors: edema, venous stasis, lymphedema, diabetes, obesity and anticoagulation therapy    Objective:      BP (!) 145/84   Pulse 70   Temp 97.7 °F (36.5 °C) (Oral)   Resp 18     Wt Readings from Last 3 Encounters:   18 242 lb 8.1 oz (110 kg)   18 238 lb 1.6 oz (108 kg)   18 238 lb (108 kg)       PHYSICAL EXAM    Extremities: no cyanosis, no clubbing, varicose veins noted-  bilateral 2+ nonpitting edema with fibrinous, granulating wounds RLE. Assessment:      No diagnosis found. Procedure Note  Indications:  Based on my examination of this patient's wound(s) today, sharp excision is required to promote healing and evaluate the extent healing. Performed by: Mick Gleason MD    Consent obtained?  Yes    Time out taken: Yes    Pain Control: Anesthetic: 4% Lidocaine With Dr. Beulah Bishop   in  1  [facility])    Your nurse  is:  Annette Rivero     Electronically signed by Jono Maguire RN on 10/1/2018 at 10:16 AM     [address] Information: Should you experience any significant changes in your wound(s) or have questions about your wound care, please contact the [address] [place] [address] at [phone] Monday-Friday from 8:00 am - 4:30 pm except for Wednesdays which hours are from 8:00 am - 2:00 pm.   If you need help with your wound outside these hours and cannot wait until we are again available, contact your PCP or go to the hospital emergency room. PLEASE NOTE: IF YOU ARE UNABLE TO OBTAIN WOUND SUPPLIES, CONTINUE TO USE THE SUPPLIES YOU HAVE AVAILABLE UNTIL YOU ARE ABLE TO REACH US. IT IS MOST IMPORTANT TO KEEP THE WOUND COVERED AT ALL TIMES. Physician orders by:     [] Dr Debbie Fitzgerald [] Dr Cuong Gutierrez    [] Dr Sterling Costello     [] Dr Ambrose Villalobos   [x] Dr Saranya Solomon  [] Dr Dyan Deal  [] Dr Holly Carlton       Physician Signature:__________________________________        The information contained in the After Visit Summary has been reviewed with me, the patient and/or responsible adult, by my health care provider(s). I had the opportunity to ask questions regarding this information.   I have elected to receive;      [] Patient unable to sign Discharge Instructions given to ECF/Transportation/POA        Electronically signed by Lisandro Torres MD on 10/1/2018 at 10:31 AM

## 2018-10-01 NOTE — PROGRESS NOTES
Multilayer Compression Wrap   (Not Unna) Below the Knee    NAME:  Mohini Wick  YOB: 1965  MEDICAL RECORD NUMBER:  [id number]  DATE:  10/1/2018       [x] Removed old Multilayer wrap if indicated and wash leg with mild soap/water.  [x] Applied moisturizing agent to dry skin as needed.  [x] Applied primary and secondary dressing as ordered    [x] Applied multilayered dressing below the knee to Right lower leg(s). profore 4 layer wrap   [x] Instructed patient/caregiver not to remove dressing and to keep it clean and dry.  [x] Instructed patient/caregiver on complications to report to provider, such as pain, numbness in toes, heavy drainage, and slippage of dressing.  [x] Instructed patient on purpose of compression dressing and on activity and exercise recommendations.     Electronically signed by Karen Aburto RN on 10/1/2018 at 10:19 AM

## 2018-10-08 ENCOUNTER — HOSPITAL ENCOUNTER (OUTPATIENT)
Dept: WOUND CARE | Age: 53
Discharge: HOME | End: 2018-10-08
Payer: MEDICAID

## 2018-10-08 VITALS
DIASTOLIC BLOOD PRESSURE: 89 MMHG | RESPIRATION RATE: 16 BRPM | SYSTOLIC BLOOD PRESSURE: 142 MMHG | HEART RATE: 71 BPM | TEMPERATURE: 98 F

## 2018-10-08 DIAGNOSIS — L97.919 IDIOPATHIC CHRONIC VENOUS HYPERTENSION OF RIGHT LOWER EXTREMITY WITH ULCER (HCC): ICD-10-CM

## 2018-10-08 DIAGNOSIS — I82.220 THROMBOSIS OF INFERIOR VENA CAVA (HCC): Primary | ICD-10-CM

## 2018-10-08 DIAGNOSIS — I74.5 THROMBOSIS OF ILIAC ARTERY (HCC): ICD-10-CM

## 2018-10-08 DIAGNOSIS — I87.311 IDIOPATHIC CHRONIC VENOUS HYPERTENSION OF RIGHT LOWER EXTREMITY WITH ULCER (HCC): ICD-10-CM

## 2018-10-08 DIAGNOSIS — Z79.01 CHRONIC ANTICOAGULATION: ICD-10-CM

## 2018-10-08 DIAGNOSIS — I82.423 THROMBOSIS OF BOTH ILIAC VEINS (HCC): ICD-10-CM

## 2018-10-08 PROCEDURE — 6370000000 HC RX 637 (ALT 250 FOR IP): Performed by: SPECIALIST

## 2018-10-08 PROCEDURE — 97597 DBRDMT OPN WND 1ST 20 CM/<: CPT

## 2018-10-08 PROCEDURE — 29581 APPL MULTLAYER CMPRN SYS LEG: CPT

## 2018-10-08 PROCEDURE — 97597 DBRDMT OPN WND 1ST 20 CM/<: CPT | Performed by: SPECIALIST

## 2018-10-08 RX ORDER — LIDOCAINE HYDROCHLORIDE 40 MG/ML
2.5 SOLUTION TOPICAL ONCE
Status: COMPLETED | OUTPATIENT
Start: 2018-10-08 | End: 2018-10-08

## 2018-10-08 RX ADMIN — LIDOCAINE HYDROCHLORIDE 2.5 ML: 40 SOLUTION TOPICAL at 10:08

## 2018-10-08 ASSESSMENT — PAIN DESCRIPTION - PAIN TYPE: TYPE: CHRONIC PAIN

## 2018-10-08 ASSESSMENT — PAIN DESCRIPTION - ORIENTATION: ORIENTATION: RIGHT

## 2018-10-08 ASSESSMENT — PAIN DESCRIPTION - DESCRIPTORS: DESCRIPTORS: ACHING

## 2018-10-08 ASSESSMENT — PAIN DESCRIPTION - PROGRESSION: CLINICAL_PROGRESSION: NOT CHANGED

## 2018-10-08 ASSESSMENT — PAIN DESCRIPTION - FREQUENCY: FREQUENCY: INTERMITTENT

## 2018-10-08 ASSESSMENT — PAIN DESCRIPTION - LOCATION: LOCATION: LEG

## 2018-10-08 ASSESSMENT — PAIN DESCRIPTION - ONSET: ONSET: ON-GOING

## 2018-10-08 NOTE — PROGRESS NOTES
1227 [facility]  Progress Note and Procedure Note      Rayshawn Sam  MEDICAL RECORD NUMBER:  [id number]  AGE: 48 y.o. GENDER: male  : 1965  EPISODE DATE:  10/8/2018    Subjective:     Chief Complaint   Patient presents with    Wound Check     right lower leg wound         HISTORY of PRESENT ILLNESS HPI     Rayshawn Sam is a 48 y.o. male who presents today for wound/ulcer evaluation. 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 Seen at [address] [place] [address] where unsuccessful dilatation of IVC was done. He has been grafted with compression wraps and lymphedema pumps with near complete healing. However, he has gone back to work and is on his feet causing partial loss of graft. He now attempted to wear his compression stockings but he states they are difficult to wear. He remains working 6 days a week and he on his feet. Numerous attempts to encourage lifestyle with work are unsuccessful. Pain Assessment:  Wound/Ulcer Pain Timing/Severity: none  Quality of pain: N/A  Severity:  0 / 10   Modifying Factors: None  Associated Signs/Symptoms: none    Ulcer Identification:  Ulcer Type: venous  Contributing Factors: edema, venous stasis, lymphedema, diabetes, obesity and anticoagulation therapy    Objective:      BP (!) 142/89   Pulse 71   Temp 98 °F (36.7 °C) (Oral)   Resp 16     Wt Readings from Last 3 Encounters:   18 242 lb 8.1 oz (110 kg)   18 238 lb 1.6 oz (108 kg)   18 238 lb (108 kg)       PHYSICAL EXAM    Extremities: no cyanosis, no clubbing and varicose veins bilateral, 2+ nonpitting edema with fibrous clustered fibrous wounds RLE. 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 Thrombosis of both iliac veins ([address]    3. Thrombosis of iliac artery (HCC)    4.  Chronic anticoagulation         Procedure Note  Indications:  Based on my examination of this patient's wound(s) today, sharp excision is required to promote healing and evaluate the extent healing. Performed by: Mariana Pena MD    Consent obtained? Yes    Time out taken: Yes    Pain Control: Anesthetic: 4% Lidocaine Liquid Topical     Debridement:Excisional Debridement    Using curette the wound was sharply debrided    down through and including the removal of  epidermis, dermis. Devitalized Tissue Debrided:  fibrin      Pre Debridement Measurements:  Are located in the Wound Documentation Flow Sheet   Wound #: 6     Post  Debridement Measurements:  Wound 05/14/18 #5 right lower medial leg venous; lymphedema ( had since 1/2016) (Active)   Wound Image   9/19/2018  9:19 AM   Wound Type Wound 10/8/2018  9:53 AM   Wound Venous 10/8/2018  9:53 AM   Dressing/Treatment Other (Comment) 9/26/2018 10:43 AM   Wound Cleansed Rinsed/Irrigated with saline 10/8/2018  9:53 AM   Wound Length (cm) 2.9 cm 10/8/2018  9:53 AM   Wound Width (cm) 3.7 cm 10/8/2018  9:53 AM   Wound Depth (cm)  0.1 10/8/2018  9:53 AM   Calculated Wound Size (cm^2) (l*w) 10.73 cm^2 10/8/2018  9:53 AM   Change in Wound Size % (l*w) 72.9 10/8/2018  9:53 AM   Distance Tunneling (cm) 0 cm 10/8/2018  9:53 AM   Undermining Maxium Distance (cm) 0 10/8/2018  9:53 AM   Wound Assessment Yellow;Red;Slough 10/8/2018  9:53 AM   Drainage Amount Moderate 10/8/2018  9:53 AM   Drainage Description Serosanguinous;Brown 10/8/2018  9:53 AM   Odor None 10/8/2018  9:53 AM   Margins Defined edges 10/8/2018  9:53 AM   Kiah-wound Assessment Dry;Pink; White 10/8/2018  9:53 AM   Non-staged Wound Description Full thickness 10/8/2018  9:53 AM   [place]%Wound Bed 0 9/19/2018  9:19 AM   Red%Wound Bed 40 10/8/2018  9:53 AM   Yellow%Wound Bed 60 10/8/2018  9:53 AM   Purple%Wound Bed 0 9/19/2018  9:19 AM   Other%Wound Bed 0 9/19/2018  9:19 AM   Op First Treatment Date 05/14/18 6/4/2018  9:36 AM   Number of days: 147       Wound 05/14/18 #6 right lower leg posterior venous/ lymphedema  (Active)   Wound Image   9/19/2018

## 2018-10-08 NOTE — PROGRESS NOTES
Multilayer Compression Wrap   (Not Unna) Below the Knee    NAME:  Preston Trejo  YOB: 1965  MEDICAL RECORD NUMBER:  [id number]  DATE:  10/8/2018       [x] Removed old Multilayer wrap if indicated and wash leg with mild soap/water.  [x] Applied moisturizing agent to dry skin as needed.  [x] Applied primary and secondary dressing as ordered    [x] Applied multilayered dressing below the knee to Right lower leg(s). profore  4 layer    [x] Instructed patient/caregiver not to remove dressing and to keep it clean and dry.  [x] Instructed patient/caregiver on complications to report to provider, such as pain, numbness in toes, heavy drainage, and slippage of dressing.  [x] Instructed patient on purpose of compression dressing and on activity and exercise recommendations.     Electronically signed by Jennifer Dempsey RN on 10/8/2018 at 10:41 AM

## 2018-10-09 ENCOUNTER — ANTI-COAG VISIT (OUTPATIENT)
Dept: FAMILY MEDICINE CLINIC | Age: 53
End: 2018-10-09

## 2018-10-09 DIAGNOSIS — Z79.01 CHRONIC ANTICOAGULATION: ICD-10-CM

## 2018-10-09 DIAGNOSIS — I82.5Z1 CHRONIC VENOUS EMBOLISM AND THROMBOSIS OF DEEP VESSELS OF DISTAL END OF RIGHT LOWER EXTREMITY (HCC): ICD-10-CM

## 2018-10-09 DIAGNOSIS — I82.423 THROMBOSIS OF BOTH ILIAC VEINS (HCC): ICD-10-CM

## 2018-10-09 DIAGNOSIS — I82.220 THROMBOSIS OF INFERIOR VENA CAVA (HCC): ICD-10-CM

## 2018-10-09 LAB
INR BLD: 1.4 (ref 0.86–1.14)
PROTHROMBIN TIME: 16 SEC (ref 9.8–13)

## 2018-10-15 ENCOUNTER — HOSPITAL ENCOUNTER (OUTPATIENT)
Dept: WOUND CARE | Age: 53
Discharge: HOME | End: 2018-10-15
Payer: MEDICAID

## 2018-10-15 VITALS
RESPIRATION RATE: 16 BRPM | HEART RATE: 72 BPM | TEMPERATURE: 98.1 F | SYSTOLIC BLOOD PRESSURE: 138 MMHG | DIASTOLIC BLOOD PRESSURE: 83 MMHG

## 2018-10-15 DIAGNOSIS — I82.423 THROMBOSIS OF BOTH ILIAC VEINS (HCC): ICD-10-CM

## 2018-10-15 DIAGNOSIS — I82.220 THROMBOSIS OF INFERIOR VENA CAVA (HCC): ICD-10-CM

## 2018-10-15 DIAGNOSIS — R60.0 LOCALIZED EDEMA: ICD-10-CM

## 2018-10-15 DIAGNOSIS — I87.311 IDIOPATHIC CHRONIC VENOUS HYPERTENSION OF RIGHT LOWER EXTREMITY WITH ULCER (HCC): Primary | ICD-10-CM

## 2018-10-15 DIAGNOSIS — L97.919 IDIOPATHIC CHRONIC VENOUS HYPERTENSION OF RIGHT LOWER EXTREMITY WITH ULCER (HCC): Primary | ICD-10-CM

## 2018-10-15 PROCEDURE — 6370000000 HC RX 637 (ALT 250 FOR IP): Performed by: SPECIALIST

## 2018-10-15 PROCEDURE — 97597 DBRDMT OPN WND 1ST 20 CM/<: CPT | Performed by: SPECIALIST

## 2018-10-15 PROCEDURE — 97597 DBRDMT OPN WND 1ST 20 CM/<: CPT

## 2018-10-15 RX ORDER — LIDOCAINE HYDROCHLORIDE 40 MG/ML
2.5 SOLUTION TOPICAL ONCE
Status: COMPLETED | OUTPATIENT
Start: 2018-10-15 | End: 2018-10-15

## 2018-10-15 RX ADMIN — LIDOCAINE HYDROCHLORIDE 2.5 ML: 40 SOLUTION TOPICAL at 09:43

## 2018-10-15 ASSESSMENT — PAIN DESCRIPTION - ONSET: ONSET: GRADUAL

## 2018-10-15 ASSESSMENT — PAIN DESCRIPTION - ORIENTATION: ORIENTATION: RIGHT

## 2018-10-15 ASSESSMENT — PAIN DESCRIPTION - PAIN TYPE: TYPE: CHRONIC PAIN

## 2018-10-15 ASSESSMENT — PAIN DESCRIPTION - DESCRIPTORS: DESCRIPTORS: ACHING

## 2018-10-15 ASSESSMENT — PAIN DESCRIPTION - LOCATION: LOCATION: LEG

## 2018-10-15 ASSESSMENT — PAIN DESCRIPTION - FREQUENCY: FREQUENCY: INTERMITTENT

## 2018-10-15 ASSESSMENT — PAIN DESCRIPTION - PROGRESSION: CLINICAL_PROGRESSION: NOT CHANGED

## 2018-10-15 ASSESSMENT — PAIN SCALES - GENERAL: PAINLEVEL_OUTOF10: 2

## 2018-10-15 NOTE — PROGRESS NOTES
MD    Consent obtained? Yes    Time out taken: Yes    Pain Control: Anesthetic: 4% Lidocaine Liquid Topical     Debridement:Excisional Debridement    Using curette the wound was sharply debrided    down through and including the removal of  epidermis and dermis. Devitalized Tissue Debrided:  fibrin      Pre Debridement Measurements:  Are located in the Wound Documentation Flow Sheet   Wound #: 6     Post  Debridement Measurements:  Wound 05/14/18 #5 right lower medial leg venous; lymphedema ( had since 1/2016) (Active)   Wound Image   10/15/2018  9:26 AM   Wound Type Wound 10/15/2018  9:26 AM   Wound Venous 10/15/2018  9:26 AM   Dressing/Treatment Other (Comment) 9/26/2018 10:43 AM   Wound Cleansed Rinsed/Irrigated with saline 10/15/2018  9:26 AM   Wound Length (cm) 0.7 cm 10/15/2018  9:26 AM   Wound Width (cm) 3.6 cm 10/15/2018  9:26 AM   Wound Depth (cm)  0.1 10/15/2018  9:26 AM   Calculated Wound Size (cm^2) (l*w) 2.52 cm^2 10/15/2018  9:26 AM   Change in Wound Size % (l*w) 93.64 10/15/2018  9:26 AM   Distance Tunneling (cm) 0 cm 10/15/2018  9:26 AM   Undermining Maxium Distance (cm) 0 10/15/2018  9:26 AM   Wound Assessment Yellow;Red;Slough;Granulation tissue 10/15/2018  9:26 AM   Drainage Amount Moderate 10/15/2018  9:26 AM   Drainage Description Serosanguinous;Brown 10/15/2018  9:26 AM   Odor None 10/15/2018  9:26 AM   Margins Defined edges 10/15/2018  9:26 AM   Kiah-wound Assessment Dry;Pink; White 10/15/2018  9:26 AM   Non-staged Wound Description Full thickness 10/15/2018  9:26 AM   [place]%Wound Bed 0 9/19/2018  9:19 AM   Red%Wound Bed 85 10/15/2018  9:26 AM   Yellow%Wound Bed 15 10/15/2018  9:26 AM   Purple%Wound Bed 0 9/19/2018  9:19 AM   Other%Wound Bed 0 9/19/2018  9:19 AM   Op First Treatment Date 05/14/18 6/4/2018  9:36 AM   Number of days: 154       Wound 05/14/18 #6 right lower leg posterior venous/ lymphedema  (Active)   Wound Image   10/15/2018  9:26 AM   Wound Type Wound 10/15/2018  9:26 AM

## 2018-10-17 ENCOUNTER — ANTI-COAG VISIT (OUTPATIENT)
Dept: FAMILY MEDICINE CLINIC | Age: 53
End: 2018-10-17

## 2018-10-17 DIAGNOSIS — I82.423 THROMBOSIS OF BOTH ILIAC VEINS (HCC): ICD-10-CM

## 2018-10-17 DIAGNOSIS — Z79.01 CHRONIC ANTICOAGULATION: ICD-10-CM

## 2018-10-17 DIAGNOSIS — I82.220 THROMBOSIS OF INFERIOR VENA CAVA (HCC): ICD-10-CM

## 2018-10-17 DIAGNOSIS — I82.5Z1 CHRONIC VENOUS EMBOLISM AND THROMBOSIS OF DEEP VESSELS OF DISTAL END OF RIGHT LOWER EXTREMITY (HCC): ICD-10-CM

## 2018-10-17 LAB
INR BLD: 1.66 (ref 0.86–1.14)
PROTHROMBIN TIME: 18.9 SEC (ref 9.8–13)

## 2018-10-22 ENCOUNTER — HOSPITAL ENCOUNTER (OUTPATIENT)
Dept: WOUND CARE | Age: 53
Discharge: HOME | End: 2018-10-22
Payer: MEDICAID

## 2018-10-22 VITALS — HEART RATE: 70 BPM | TEMPERATURE: 97.6 F | RESPIRATION RATE: 16 BRPM

## 2018-10-22 DIAGNOSIS — I89.0 CHRONIC ACQUIRED LYMPHEDEMA: ICD-10-CM

## 2018-10-22 DIAGNOSIS — Z79.01 CHRONIC ANTICOAGULATION: ICD-10-CM

## 2018-10-22 DIAGNOSIS — I87.311 IDIOPATHIC CHRONIC VENOUS HYPERTENSION OF RIGHT LOWER EXTREMITY WITH ULCER (HCC): ICD-10-CM

## 2018-10-22 DIAGNOSIS — R60.0 LOCALIZED EDEMA: ICD-10-CM

## 2018-10-22 DIAGNOSIS — L97.919 IDIOPATHIC CHRONIC VENOUS HYPERTENSION OF RIGHT LOWER EXTREMITY WITH ULCER (HCC): ICD-10-CM

## 2018-10-22 DIAGNOSIS — I82.220 THROMBOSIS OF INFERIOR VENA CAVA (HCC): Primary | ICD-10-CM

## 2018-10-22 DIAGNOSIS — I82.423 THROMBOSIS OF BOTH ILIAC VEINS (HCC): ICD-10-CM

## 2018-10-22 PROCEDURE — 15271 SKIN SUB GRAFT TRNK/ARM/LEG: CPT

## 2018-10-22 PROCEDURE — 97597 DBRDMT OPN WND 1ST 20 CM/<: CPT

## 2018-10-22 PROCEDURE — 6370000000 HC RX 637 (ALT 250 FOR IP): Performed by: SPECIALIST

## 2018-10-22 PROCEDURE — 29581 APPL MULTLAYER CMPRN SYS LEG: CPT

## 2018-10-22 PROCEDURE — 15271 SKIN SUB GRAFT TRNK/ARM/LEG: CPT | Performed by: SPECIALIST

## 2018-10-22 RX ORDER — LIDOCAINE HYDROCHLORIDE 40 MG/ML
2.5 SOLUTION TOPICAL ONCE
Status: COMPLETED | OUTPATIENT
Start: 2018-10-22 | End: 2018-10-22

## 2018-10-22 RX ADMIN — LIDOCAINE HYDROCHLORIDE 2.5 ML: 40 SOLUTION TOPICAL at 10:50

## 2018-10-22 NOTE — PROGRESS NOTES
Multilayer Compression Wrap   (Not Unna) Below the Knee    NAME:  Sharon Frias  YOB: 1965  MEDICAL RECORD NUMBER:  [id number]  DATE:  10/22/2018       [] Removed old Multilayer wrap if present and washed leg with mild soap/water.  [x] Applied moisturizing agent to dry skin as needed.  [x] Applied primary and secondary dressing as ordered     [x] Applied multilayered dressing below the knee to Right lower leg(s)  (4 Layer Compression Wrap) per 's instructions.  [x] Instructed patient/caregiver not to remove dressing and to keep it clean and dry.  [x] Instructed patient/caregiver on complications to report to provider, such as pain, numbness in toes, heavy drainage, and slippage of dressing.  [x] Instructed patient on purpose of compression dressing and on activity and exercise recommendations.        Applied per   Guidelines    Electronically signed by Jeanne Morris RN on 10/22/2018 at 10:27 AM

## 2018-10-22 NOTE — PROGRESS NOTES
[facility]  Progress Note and Procedure Note      Pernell Estrada  MEDICAL RECORD NUMBER:  [id number]  AGE: 48 y.o. GENDER: male  : 1965  EPISODE DATE:  10/22/2018    Subjective:     Chief Complaint   Patient presents with    Wound Check     rle         HISTORY of PRESENT ILLNESS HPI     Pernell Estrada is a 48 y.o. male who presents today for wound/ulcer evaluation. 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 Seen at [address] [place] [address] where unsuccessful dilatation of IVC was done. He has been grafted with compression wraps and lymphedema pumps with near complete healing. However, he has gone back to work and is on his feet causing partial loss of graft. He now attempted to wear his compression stockings but he states they are difficult to wear. He remains working 6 days a week and he on his feet. Numerous attempts to encourage lifestyle with work are unsuccessful. Pain Assessment:  Wound/Ulcer Pain Timing/Severity: none  Quality of pain: N/A  Severity:  0 / 10   Modifying Factors: None  Associated Signs/Symptoms: none    Ulcer Identification:  Ulcer Type: venous  Contributing Factors: edema, venous stasis, lymphedema, diabetes, obesity and anticoagulation therapy    Objective:      Pulse 70   Temp 97.6 °F (36.4 °C) (Oral)   Resp 16     Wt Readings from Last 3 Encounters:   18 242 lb 8.1 oz (110 kg)   18 238 lb 1.6 oz (108 kg)   18 238 lb (108 kg)       PHYSICAL EXAM    Extremities: no cyanosis, no clubbing and 2 + nonpitting edema   bilateral small fibrinous granulating wounds RLE see measurements    Assessment:      No diagnosis found. Procedure Note  Indications:  Based on my examination of this patient's wound(s) today, sharp excision is required to promote healing and evaluate the extent healing. Performed by: Halima Lugo MD    Consent obtained?  Yes    Time out taken: Yes    Pain legumes, and yogurt)  2. No added salt  3. If diabetic, follow a diabetic diet and check glucose prior to meals or as instructed by your physician. Return Appointment:  [] Wound and dressing supply provider:   [] ECF or Home Healthcare:  [] Wound reassessment :    [x] Return Appointment: With Dr. Hugo Corey   in  1 [facility])   [x] Schedule for the next 4 weeks       Your nurse  is:  Jean Pierre Munoz     Electronically signed by Ambrose Cottrell RN on 10/22/2018 at 10:02 AM     [facility] Information: Should you experience any significant changes in your wound(s) or have questions about your wound care, please contact the [address] [place] [address] at [phone] Monday-Friday from 8:00 am - 4:30 pm except for Wednesdays which hours are from 8:00 am - 2:00 pm.   If you need help with your wound outside these hours and cannot wait until we are again available, contact your PCP or go to the hospital emergency room. PLEASE NOTE: IF YOU ARE UNABLE TO OBTAIN WOUND SUPPLIES, CONTINUE TO USE THE SUPPLIES YOU HAVE AVAILABLE UNTIL YOU ARE ABLE TO REACH US. IT IS MOST IMPORTANT TO KEEP THE WOUND COVERED AT ALL TIMES. Physician orders by:     [] Dr Lucinda Aragon [] Dr Klever De Dios    [] Dr Chica Wang     [] Dr Dameon Roa   [x] Dr Kenyatta Corey  [] Dr Teagan Yu  [] Dr Benjie Ramirez       Physician Signature:__________________________________        The information contained in the After Visit Summary has been reviewed with me, the patient and/or responsible adult, by my health care provider(s). I had the opportunity to ask questions regarding this information.      [] Patient unable to sign Discharge Instructions given to ECF/Transportation/POA        Electronically signed by Hayder Ramirez MD on 10/22/2018 at 1:25 PM

## 2018-10-29 ENCOUNTER — HOSPITAL ENCOUNTER (OUTPATIENT)
Dept: WOUND CARE | Age: 53
Discharge: HOME | End: 2018-10-29
Payer: MEDICAID

## 2018-10-29 DIAGNOSIS — I82.220 THROMBOSIS OF INFERIOR VENA CAVA (HCC): ICD-10-CM

## 2018-10-29 DIAGNOSIS — I89.0 CHRONIC ACQUIRED LYMPHEDEMA: ICD-10-CM

## 2018-10-29 DIAGNOSIS — I87.311 IDIOPATHIC CHRONIC VENOUS HYPERTENSION OF RIGHT LOWER EXTREMITY WITH ULCER (HCC): ICD-10-CM

## 2018-10-29 DIAGNOSIS — R60.0 LOCALIZED EDEMA: Primary | ICD-10-CM

## 2018-10-29 DIAGNOSIS — L97.919 IDIOPATHIC CHRONIC VENOUS HYPERTENSION OF RIGHT LOWER EXTREMITY WITH ULCER (HCC): ICD-10-CM

## 2018-10-29 PROCEDURE — 11042 DBRDMT SUBQ TIS 1ST 20SQCM/<: CPT

## 2018-10-29 PROCEDURE — 97597 DBRDMT OPN WND 1ST 20 CM/<: CPT | Performed by: SPECIALIST

## 2018-10-29 PROCEDURE — 97597 DBRDMT OPN WND 1ST 20 CM/<: CPT

## 2018-10-29 PROCEDURE — 6370000000 HC RX 637 (ALT 250 FOR IP): Performed by: SPECIALIST

## 2018-10-29 RX ORDER — LIDOCAINE HYDROCHLORIDE 40 MG/ML
2.5 SOLUTION TOPICAL ONCE
Status: COMPLETED | OUTPATIENT
Start: 2018-10-29 | End: 2018-10-29

## 2018-10-29 RX ORDER — DOXYCYCLINE HYCLATE 100 MG/1
100 CAPSULE ORAL 2 TIMES DAILY
COMMUNITY
End: 2018-11-05

## 2018-10-29 RX ORDER — DOXYCYCLINE HYCLATE 100 MG
100 TABLET ORAL 2 TIMES DAILY
Qty: 20 TABLET | Refills: 0 | Status: SHIPPED | OUTPATIENT
Start: 2018-10-29 | End: 2018-11-08

## 2018-10-29 RX ADMIN — LIDOCAINE HYDROCHLORIDE 2.5 ML: 40 SOLUTION TOPICAL at 10:02

## 2018-10-29 NOTE — PROGRESS NOTES
Polysporin/Neosporin  [] Other:      Pack wound loosely with: [] Iodoform   [] Plain Packing  [] Saline \"wet to dry\"      [x] Cover and Secure with:     [x] Dry Gauze  [] ABD [] Cast padding [x] Kerlix or Lyndsey rolled gauze   [] Coban [] Ace Wrap  [] Ace Wrap from forefoot to just below the knee  [] Paper Tape [] Medipore Tape  [] Other:    Avoid contact of tape with skin if possible. [x] Change dressing: [x] Daily    [] Every Other Day [] Three times per week   [] Once a week [] Do Not Change Dressing   [] Other:     Edema Control:  Apply: [x] Compression Stocking [x]Right Leg []Left Leg     [] Double Layer Medigrip/Single Layer Spandagrip       []Right Leg  []Left Leg     Strength:       []Low compression 5-10 mm/Hg      []Medium compression 10-20 mm/Hg     []High compression  20-30 mm/Hg    Apply every morning immediately when getting up. They should be applied to affected leg(s) from mid foot to knee making sure to cover the heel. Remove every night before going to bed. [x] Elevate leg(s) above the level of the heart for 30 minutes 4-5 times a day and/or when sitting. [x] Avoid prolonged standing in one place. Lymphedema Therapy:   [x] Wear Lymphedema pumps twice a day at settings prescribed by your physician. [x]Elevate leg(s) above the level of the heart for 30 minutes 4-5 times a day and/or when sitting. [x] Avoid prolonged standing in one place. Dietary:  Important dietary reminders:  1. Increase Protein intake (i.e. Lean meats, fish, eggs, legumes, and yogurt)  2. No added salt  3. If diabetic, follow a diabetic diet and check glucose prior to meals or as instructed by your physician. Return Appointment:  [] Wound and dressing supply provider:   [] ECF or Home Healthcare:  [] Wound reassessment :    [x] Return Appointment: With Dr. Belem Reardon  in  [address] [place] [address])   [x] Schedule for the next 4  Weeks     Start Doxycline 100mg twice daily.      Change coumadin to 5mg

## 2018-11-05 ENCOUNTER — TELEPHONE (OUTPATIENT)
Dept: WOUND CARE | Age: 53
End: 2018-11-05

## 2018-11-05 ENCOUNTER — HOSPITAL ENCOUNTER (OUTPATIENT)
Dept: WOUND CARE | Age: 53
Discharge: HOME | End: 2018-11-05
Payer: MEDICAID

## 2018-11-05 ENCOUNTER — TELEPHONE (OUTPATIENT)
Dept: FAMILY MEDICINE CLINIC | Age: 53
End: 2018-11-05

## 2018-11-05 VITALS
SYSTOLIC BLOOD PRESSURE: 147 MMHG | TEMPERATURE: 98 F | DIASTOLIC BLOOD PRESSURE: 92 MMHG | RESPIRATION RATE: 18 BRPM | HEART RATE: 71 BPM

## 2018-11-05 DIAGNOSIS — I82.5Z1 CHRONIC VENOUS EMBOLISM AND THROMBOSIS OF DEEP VESSELS OF DISTAL END OF RIGHT LOWER EXTREMITY (HCC): Primary | ICD-10-CM

## 2018-11-05 DIAGNOSIS — R60.0 LOCALIZED EDEMA: ICD-10-CM

## 2018-11-05 DIAGNOSIS — I87.311 IDIOPATHIC CHRONIC VENOUS HYPERTENSION OF RIGHT LOWER EXTREMITY WITH ULCER (HCC): ICD-10-CM

## 2018-11-05 DIAGNOSIS — I82.5Z1 CHRONIC VENOUS EMBOLISM AND THROMBOSIS OF DEEP VESSELS OF DISTAL END OF RIGHT LOWER EXTREMITY (HCC): ICD-10-CM

## 2018-11-05 DIAGNOSIS — L97.919 IDIOPATHIC CHRONIC VENOUS HYPERTENSION OF RIGHT LOWER EXTREMITY WITH ULCER (HCC): ICD-10-CM

## 2018-11-05 DIAGNOSIS — Z79.01 CHRONIC ANTICOAGULATION: ICD-10-CM

## 2018-11-05 DIAGNOSIS — I89.0 CHRONIC ACQUIRED LYMPHEDEMA: ICD-10-CM

## 2018-11-05 DIAGNOSIS — I82.423 THROMBOSIS OF BOTH ILIAC VEINS (HCC): ICD-10-CM

## 2018-11-05 DIAGNOSIS — I82.220 THROMBOSIS OF INFERIOR VENA CAVA (HCC): ICD-10-CM

## 2018-11-05 LAB
INR BLD: 1.1 (ref 0.86–1.14)
PROTHROMBIN TIME: 12.3 SEC (ref 9.8–13)

## 2018-11-05 PROCEDURE — 97597 DBRDMT OPN WND 1ST 20 CM/<: CPT | Performed by: SPECIALIST

## 2018-11-05 PROCEDURE — 36415 COLL VENOUS BLD VENIPUNCTURE: CPT

## 2018-11-05 PROCEDURE — 97597 DBRDMT OPN WND 1ST 20 CM/<: CPT

## 2018-11-05 PROCEDURE — 85610 PROTHROMBIN TIME: CPT

## 2018-11-05 RX ORDER — LIDOCAINE HYDROCHLORIDE 40 MG/ML
2.5 SOLUTION TOPICAL ONCE
Status: DISCONTINUED | OUTPATIENT
Start: 2018-11-05 | End: 2018-11-06 | Stop reason: HOSPADM

## 2018-11-05 RX ORDER — DOXYCYCLINE HYCLATE 100 MG
100 TABLET ORAL 2 TIMES DAILY
Qty: 20 TABLET | Refills: 0 | Status: SHIPPED | OUTPATIENT
Start: 2018-11-05 | End: 2018-11-15

## 2018-11-05 RX ORDER — WARFARIN SODIUM 5 MG/1
TABLET ORAL
Qty: 30 TABLET | Refills: 1 | Status: SHIPPED | OUTPATIENT
Start: 2018-11-05 | End: 2018-11-12 | Stop reason: SDUPTHER

## 2018-11-05 ASSESSMENT — PAIN SCALES - GENERAL: PAINLEVEL_OUTOF10: 2

## 2018-11-05 ASSESSMENT — PAIN DESCRIPTION - LOCATION: LOCATION: LEG

## 2018-11-05 ASSESSMENT — PAIN DESCRIPTION - DESCRIPTORS: DESCRIPTORS: BURNING

## 2018-11-05 ASSESSMENT — PAIN DESCRIPTION - PAIN TYPE: TYPE: CHRONIC PAIN

## 2018-11-05 ASSESSMENT — PAIN DESCRIPTION - ORIENTATION: ORIENTATION: RIGHT

## 2018-11-05 NOTE — PROGRESS NOTES
posterior venous/ lymphedema  (Active)   Wound Image   10/15/2018  9:26 AM   Wound Type Wound 11/5/2018  8:32 AM   Wound Venous 10/29/2018  9:43 AM   Dressing/Treatment Alginate with Ag;Dry dressing 11/5/2018  9:32 AM   Wound Cleansed Rinsed/Irrigated with saline 11/5/2018  8:32 AM   Dressing Change Due 03/03/18 10/15/2018  9:26 AM   Wound Length (cm) 2.9 cm 11/5/2018  8:32 AM   Wound Width (cm) 2 cm 11/5/2018  8:32 AM   Wound Depth (cm)  0.1 11/5/2018  8:32 AM   Calculated Wound Size (cm^2) (l*w) 5.8 cm^2 11/5/2018  8:32 AM   Change in Wound Size % (l*w) 82.02 11/5/2018  8:32 AM   Distance Tunneling (cm) 0 cm 11/5/2018  8:32 AM   Tunneling Position ___ O'Clock 0 10/1/2018  9:53 AM   Undermining Maxium Distance (cm) 0 11/5/2018  8:32 AM   Wound Assessment Yellow;Red;Slough;Granulation tissue 11/5/2018  8:32 AM   Drainage Amount Small 11/5/2018  8:32 AM   Drainage Description Serosanguinous 11/5/2018  8:32 AM   Odor None 11/5/2018  8:32 AM   Margins Defined edges 11/5/2018  8:32 AM   Kiah-wound Assessment Pink;Purple 11/5/2018  8:32 AM   Non-staged Wound Description Full thickness 11/5/2018  8:32 AM   [place]%Wound Bed 0 9/19/2018  9:19 AM   Red%Wound Bed 70 11/5/2018  8:32 AM   Yellow%Wound Bed 30 11/5/2018  8:32 AM   Black%Wound Bed 0 10/22/2018 10:14 AM   Purple%Wound Bed 0 9/19/2018  9:19 AM   Other%Wound Bed 0 11/5/2018  8:32 AM   Op First Treatment Date 05/14/18 6/4/2018  9:36 AM   Number of days: 175       Percent of Wound Debrided: 100%    Total Surface Area Debrided:  14 sq cm    Diabetic/Pressure/Non Pressure Ulcers only:  Ulcer: Non-Pressure ulcer, limited to breakdown of skin    Bleeding: Minimal    Hemostasis Achieved: by pressure    Procedural Pain: 0  / 10     Post Procedural Pain: 0 / 10     Response to treatment:  Well tolerated by patient.  Patient's edema is under control with 50 mm HG stockings,wounds slowly re epithelializing see measurements         Plan:     Treatment Note: Please see attached Hydrogel    [] Endoform      [] Santyl covered with gauze moisten with saline    [] Bactroban/Mupirocin   [] Polysporin/Neosporin  [] Other:      Pack wound loosely with: [] Iodoform   [] Plain Packing  [] Saline \"wet to dry\"      [x] Cover and Secure with:     [x] Dry Gauze  [] ABD [] Cast padding [x] Kerlix or Lyndsey rolled gauze   [] Coban [] Ace Wrap  [] Ace Wrap from forefoot to just below the knee  [] Paper Tape [] Medipore Tape  [] Other:    Avoid contact of tape with skin if possible. [x] Change dressing: [x] Daily    [] Every Other Day [] Three times per week   [] Once a week [] Do Not Change Dressing   [] Other:         Edema Control:  Apply: [x] Compression Stocking [x]Right Leg []Left Leg     [] Double Layer Medigrip/Single Layer Spandagrip       []Right Leg  []Left Leg     Strength:       []Low compression 5-10 mm/Hg      []Medium compression 10-20 mm/Hg     []High compression  20-30 mm/Hg    Apply every morning immediately when getting up. They should be applied to affected leg(s) from mid foot to knee making sure to cover the heel. Remove every night before going to bed. [x] Elevate leg(s) above the level of the heart for 30 minutes 4-5 times a day and/or when sitting. [x] Avoid prolonged standing in one place. Lymphedema Therapy:   [x] Wear Lymphedema pumps twice a day at settings prescribed by your physician. [x]Elevate leg(s) above the level of the heart for 30 minutes 4-5 times a day and/or when sitting. [x] Avoid prolonged standing in one place. Dietary:  Important dietary reminders:  1. Increase Protein intake (i.e. Lean meats, fish, eggs, legumes, and yogurt)  2. No added salt  3. If diabetic, follow a diabetic diet and check glucose prior to meals or as instructed by your physician.         Return Appointment:  [] Wound and dressing supply provider:   [] ECF or Home Healthcare:  [] Wound reassessment :    [x] Return Appointment: With Dr. Andre Suh  in  1 Week(s)   [x] Schedule for the next 4 weeks     [] Ordered tests:  [x] Blood work: INR drawn today [] Vascular Test(arterial/venous)     Your nurse  is: Yesenia     Electronically signed by Esha Ibarra RN on 11/5/2018 at [address]: Should you experience any significant changes in your wound(s) or have questions about your wound care, please contact the [address] [place] [address] at [phone] Monday-Friday from 8:00 am - 4:30 pm except for Wednesdays which hours are from 8:00 am - 2:00 pm.   If you need help with your wound outside these hours and cannot wait until we are again available, contact your PCP or go to the hospital emergency room. PLEASE NOTE: IF YOU ARE UNABLE TO OBTAIN WOUND SUPPLIES, CONTINUE TO USE THE SUPPLIES YOU HAVE AVAILABLE UNTIL YOU ARE ABLE TO REACH US. IT IS MOST IMPORTANT TO KEEP THE WOUND COVERED AT ALL TIMES. Physician orders by:     [] Dr Karey Nelson [] Dr Rosanna Smallwood    [] Dr Edgard Prajapati     [] Dr Anupam Ramirez   [x] Dr Noah Jo  [] Dr Anca Torres  [] Dr Jefferson Wilson       Physician Signature:__________________________________        The information contained in the After Visit Summary has been reviewed with me, the patient and/or responsible adult, by my health care provider(s). I had the opportunity to ask questions regarding this information.      [] Patient unable to sign Discharge Instructions given to ECF/Transportation/POA        Electronically signed by Lilo Davis MD on 11/5/2018 at 11:10 AM

## 2018-11-05 NOTE — TELEPHONE ENCOUNTER
Patient needs a refill on warfarin (COUMADIN) 5 MG tablet       . They need a 30 day supply.      Mail order or local pharmacy: local    Pharmacy: [facility] on [address]

## 2018-11-06 ENCOUNTER — TELEPHONE (OUTPATIENT)
Dept: FAMILY MEDICINE CLINIC | Age: 53
End: 2018-11-06

## 2018-11-06 ENCOUNTER — TELEPHONE (OUTPATIENT)
Dept: WOUND CARE | Age: 53
End: 2018-11-06

## 2018-11-06 DIAGNOSIS — I82.5Z1 CHRONIC VENOUS EMBOLISM AND THROMBOSIS OF DEEP VESSELS OF DISTAL END OF RIGHT LOWER EXTREMITY (HCC): ICD-10-CM

## 2018-11-06 DIAGNOSIS — I82.423 THROMBOSIS OF BOTH ILIAC VEINS (HCC): ICD-10-CM

## 2018-11-06 DIAGNOSIS — I82.220 THROMBOSIS OF INFERIOR VENA CAVA (HCC): ICD-10-CM

## 2018-11-06 DIAGNOSIS — Z79.01 CHRONIC ANTICOAGULATION: ICD-10-CM

## 2018-11-09 NOTE — TELEPHONE ENCOUNTER
Pt's pharmacy called requesting clarification on this same issue, regarding 11/5/18 warfarin (COUMADIN) 5 MG tablet script. They state that they need an adjusted E-script with the correct directions (which I suspect are the ones given by Garo Loera above), since they require a dosing frequency to be listed on the script. OK to e-scribe adjusted script to pharmacy below? Thank you!     Pharmacy:  [facility], [address] [place] [address]

## 2018-11-12 ENCOUNTER — HOSPITAL ENCOUNTER (OUTPATIENT)
Dept: WOUND CARE | Age: 53
Discharge: HOME | End: 2018-11-12
Payer: MEDICAID

## 2018-11-12 VITALS
DIASTOLIC BLOOD PRESSURE: 76 MMHG | HEART RATE: 69 BPM | SYSTOLIC BLOOD PRESSURE: 124 MMHG | TEMPERATURE: 97.8 F | RESPIRATION RATE: 18 BRPM

## 2018-11-12 DIAGNOSIS — L97.919 IDIOPATHIC CHRONIC VENOUS HYPERTENSION OF RIGHT LOWER EXTREMITY WITH ULCER (HCC): ICD-10-CM

## 2018-11-12 DIAGNOSIS — I82.220 THROMBOSIS OF INFERIOR VENA CAVA (HCC): Primary | ICD-10-CM

## 2018-11-12 DIAGNOSIS — I87.311 IDIOPATHIC CHRONIC VENOUS HYPERTENSION OF RIGHT LOWER EXTREMITY WITH ULCER (HCC): ICD-10-CM

## 2018-11-12 DIAGNOSIS — R60.0 LOCALIZED EDEMA: ICD-10-CM

## 2018-11-12 LAB
INR BLD: 1.37 (ref 0.86–1.14)
PROTHROMBIN TIME: 15.6 SEC (ref 9.8–13)

## 2018-11-12 PROCEDURE — 97597 DBRDMT OPN WND 1ST 20 CM/<: CPT | Performed by: SPECIALIST

## 2018-11-12 PROCEDURE — 6370000000 HC RX 637 (ALT 250 FOR IP): Performed by: SPECIALIST

## 2018-11-12 PROCEDURE — 36415 COLL VENOUS BLD VENIPUNCTURE: CPT

## 2018-11-12 PROCEDURE — 97597 DBRDMT OPN WND 1ST 20 CM/<: CPT

## 2018-11-12 PROCEDURE — 85610 PROTHROMBIN TIME: CPT

## 2018-11-12 RX ORDER — WARFARIN SODIUM 5 MG/1
TABLET ORAL
Qty: 90 TABLET | Refills: 3 | Status: SHIPPED | OUTPATIENT
Start: 2018-11-12 | End: 2019-01-07 | Stop reason: SDUPTHER

## 2018-11-12 RX ORDER — LIDOCAINE HYDROCHLORIDE 40 MG/ML
2.5 SOLUTION TOPICAL ONCE
Status: COMPLETED | OUTPATIENT
Start: 2018-11-12 | End: 2018-11-12

## 2018-11-12 RX ORDER — DOXYCYCLINE HYCLATE 100 MG
100 TABLET ORAL 2 TIMES DAILY
Qty: 20 TABLET | Refills: 0 | Status: SHIPPED | OUTPATIENT
Start: 2018-11-12 | End: 2018-11-22

## 2018-11-12 RX ADMIN — LIDOCAINE HYDROCHLORIDE 2.5 ML: 40 SOLUTION TOPICAL at 08:33

## 2018-11-12 ASSESSMENT — PAIN SCALES - GENERAL: PAINLEVEL_OUTOF10: 2

## 2018-11-12 ASSESSMENT — PAIN DESCRIPTION - PAIN TYPE: TYPE: CHRONIC PAIN

## 2018-11-12 ASSESSMENT — PAIN DESCRIPTION - LOCATION: LOCATION: LEG

## 2018-11-12 ASSESSMENT — PAIN DESCRIPTION - ORIENTATION: ORIENTATION: RIGHT

## 2018-11-12 NOTE — PROGRESS NOTES
10/29/2018  9:43 AM   Dressing/Treatment Alginate with Ag;Dry dressing 11/5/2018  9:32 AM   Wound Cleansed Rinsed/Irrigated with saline 11/12/2018  8:19 AM   Dressing Change Due 03/03/18 10/15/2018  9:26 AM   Wound Length (cm) 3 cm 11/12/2018  8:19 AM   Wound Width (cm) 1.3 cm 11/12/2018  8:19 AM   Wound Depth (cm)  0.1 11/12/2018  8:19 AM   Calculated Wound Size (cm^2) (l*w) 3.9 cm^2 11/12/2018  8:19 AM   Change in Wound Size % (l*w) 87.91 11/12/2018  8:19 AM   Distance Tunneling (cm) 0 cm 11/12/2018  8:19 AM   Tunneling Position ___ O'Clock 0 10/1/2018  9:53 AM   Undermining Maxium Distance (cm) 0 11/12/2018  8:19 AM   Wound Assessment Yellow;Red;Slough;Granulation tissue 11/12/2018  8:19 AM   Drainage Amount Small 11/12/2018  8:19 AM   Drainage Description Serosanguinous 11/12/2018  8:19 AM   Odor None 11/12/2018  8:19 AM   Margins Defined edges 11/12/2018  8:19 AM   Kiah-wound Assessment Dry;Pink 11/12/2018  8:19 AM   Non-staged Wound Description Full thickness 11/12/2018  8:19 AM   [place]%Wound Bed 0 9/19/2018  9:19 AM   Red%Wound Bed 70 11/12/2018  8:19 AM   Yellow%Wound Bed 30 11/12/2018  8:19 AM   Black%Wound Bed 0 10/22/2018 10:14 AM   Purple%Wound Bed 0 9/19/2018  9:19 AM   Other%Wound Bed 0 11/5/2018  8:32 AM   Op First Treatment Date 05/14/18 6/4/2018  9:36 AM   Number of days: 182       Percent of Wound Debrided: 100%    Total Surface Area Debrided:  13.4 sq cm    Diabetic/Pressure/Non Pressure Ulcers only:  Ulcer: Non-Pressure ulcer, fat layer exposed    Bleeding: Minimal    Hemostasis Achieved: by pressure    Procedural Pain: 0  / 10     Post Procedural Pain: 0 / 10     Response to treatment:  Well tolerated by patient. Wounds are all granulating with stable edema on compression stockings. Plan:     Treatment Note: Please see attached Discharge Instructions.   These instructions were given and signed by the patient or POA    New Medication(s) at this visit:   New Prescriptions    DOXYCYCLINE HYCLATE (VIBRA-TABS) 100 MG TABLET    Take 1 tablet by mouth 2 times daily for 10 days       In my professional opinion this patient would benefit from HBO Therapy: No       Patient is to return to wound care center in:  1 week(s)    Discharge [facility] Physician Orders and Discharge Instructions  [facility]  [address], [address]  Telephone: [phone]    NAME:  Mar Quintana  YOB: 1965  MEDICAL RECORD NUMBER:  [id number]  DATE:  11/12/2018    Wash hands with soap and water prior to and after every dressing change. Wound Cleansing:   · Do not scrub or use excessive force. · With each dressing change, rinse wounds with 0.9% Saline. (May use wound wash or soft contact solution. Both can be purchased at a local drug store). · If unable to obtain saline, may use a gentle soap and water. · Keep wounds dry in the shower unless otherwise instructed by the physician. Topical Treatments:  Do not apply lotions, creams, or ointments to wound bed unless directed. [] Apply moisturizing lotion to skin surrounding the wound prior to dressing change.  [] Apply antifungal ointment to skin surrounding the wound prior to dressing change.  [] Apply thin film of moisture barrier ointment to skin immediately around wound.   [] Other:      Dressings:           Wound Location: right lower leg wound      [x] Apply Primary Dressing to wound:       [] Foam/Foam with Border(i.e Mepilex) [] Non-adherent (i.e.Mepitel)   [] Alginate with Silver (i.e. Silvercel)  [] Alginate (i.e. Aquacel)   [] Collagen (i.e. Puracol)   [x] Collagen with Silver(i.e. Candice)     [] Hydrocolloid    [] Hydrafera Blue moistened with saline   [] MediHoney Paste/Gel   [] Hydrogel    [] Endoform      [] Santyl covered with gauze moisten with saline    [] Bactroban/Mupirocin   [] Polysporin/Neosporin  [] Other:      Pack wound loosely

## 2018-11-19 ENCOUNTER — TELEPHONE (OUTPATIENT)
Dept: FAMILY MEDICINE CLINIC | Age: 53
End: 2018-11-19

## 2018-11-19 ENCOUNTER — HOSPITAL ENCOUNTER (OUTPATIENT)
Dept: WOUND CARE | Age: 53
Discharge: HOME | End: 2018-11-19
Payer: MEDICAID

## 2018-11-19 VITALS
HEART RATE: 70 BPM | RESPIRATION RATE: 16 BRPM | TEMPERATURE: 97.7 F | DIASTOLIC BLOOD PRESSURE: 67 MMHG | SYSTOLIC BLOOD PRESSURE: 131 MMHG

## 2018-11-19 DIAGNOSIS — I74.5 THROMBOSIS OF ILIAC ARTERY (HCC): ICD-10-CM

## 2018-11-19 DIAGNOSIS — I82.220 THROMBOSIS OF INFERIOR VENA CAVA (HCC): Primary | ICD-10-CM

## 2018-11-19 DIAGNOSIS — I89.0 CHRONIC ACQUIRED LYMPHEDEMA: ICD-10-CM

## 2018-11-19 DIAGNOSIS — I82.5Z1 CHRONIC VENOUS EMBOLISM AND THROMBOSIS OF DEEP VESSELS OF DISTAL END OF RIGHT LOWER EXTREMITY (HCC): ICD-10-CM

## 2018-11-19 DIAGNOSIS — I82.423 THROMBOSIS OF BOTH ILIAC VEINS (HCC): ICD-10-CM

## 2018-11-19 DIAGNOSIS — Z79.01 CHRONIC ANTICOAGULATION: ICD-10-CM

## 2018-11-19 LAB
INR BLD: 1.46 (ref 0.86–1.14)
PROTHROMBIN TIME: 16.6 SEC (ref 9.8–13)

## 2018-11-19 PROCEDURE — 11042 DBRDMT SUBQ TIS 1ST 20SQCM/<: CPT | Performed by: SPECIALIST

## 2018-11-19 PROCEDURE — 6370000000 HC RX 637 (ALT 250 FOR IP): Performed by: SPECIALIST

## 2018-11-19 PROCEDURE — 11042 DBRDMT SUBQ TIS 1ST 20SQCM/<: CPT

## 2018-11-19 PROCEDURE — 36415 COLL VENOUS BLD VENIPUNCTURE: CPT

## 2018-11-19 PROCEDURE — 85610 PROTHROMBIN TIME: CPT

## 2018-11-19 RX ORDER — LIDOCAINE HYDROCHLORIDE 40 MG/ML
2.5 SOLUTION TOPICAL ONCE
Status: COMPLETED | OUTPATIENT
Start: 2018-11-19 | End: 2018-11-19

## 2018-11-19 RX ADMIN — LIDOCAINE HYDROCHLORIDE 2.5 ML: 40 SOLUTION TOPICAL at 08:31

## 2018-11-19 ASSESSMENT — PAIN DESCRIPTION - ORIENTATION: ORIENTATION: RIGHT

## 2018-11-19 ASSESSMENT — PAIN DESCRIPTION - DESCRIPTORS: DESCRIPTORS: BURNING

## 2018-11-19 ASSESSMENT — PAIN DESCRIPTION - LOCATION: LOCATION: LEG

## 2018-11-19 ASSESSMENT — PAIN DESCRIPTION - PROGRESSION: CLINICAL_PROGRESSION: NOT CHANGED

## 2018-11-19 ASSESSMENT — PAIN DESCRIPTION - FREQUENCY: FREQUENCY: INTERMITTENT

## 2018-11-19 ASSESSMENT — PAIN DESCRIPTION - ONSET: ONSET: GRADUAL

## 2018-11-19 ASSESSMENT — PAIN SCALES - GENERAL: PAINLEVEL_OUTOF10: 3

## 2018-11-19 ASSESSMENT — PAIN DESCRIPTION - PAIN TYPE: TYPE: CHRONIC PAIN

## 2018-11-19 NOTE — PROGRESS NOTES
05/14/18 6/4/2018  9:36 AM   Number of days: 189       Wound 05/14/18 #6 right lower leg posterior venous/ lymphedema  (Active)   Wound Image   11/12/2018  8:19 AM   Wound Type Wound 11/19/2018  8:16 AM   Wound Venous 11/19/2018  8:16 AM   Dressing/Treatment Alginate with Ag;Dry dressing 11/5/2018  9:32 AM   Wound Cleansed Rinsed/Irrigated with saline 11/19/2018  8:16 AM   Dressing Change Due 03/03/18 10/15/2018  9:26 AM   Wound Length (cm) 3 cm 11/19/2018  8:16 AM   Wound Width (cm) 1.4 cm 11/19/2018  8:16 AM   Wound Depth (cm)  0.1 11/19/2018  8:16 AM   Calculated Wound Size (cm^2) (l*w) 4.2 cm^2 11/19/2018  8:16 AM   Change in Wound Size % (l*w) 86.98 11/19/2018  8:16 AM   Distance Tunneling (cm) 0 cm 11/19/2018  8:16 AM   Tunneling Position ___ O'Clock 0 10/1/2018  9:53 AM   Undermining Maxium Distance (cm) 0 11/19/2018  8:16 AM   Wound Assessment Yellow;Slough; Red 11/19/2018  8:16 AM   Drainage Amount Small 11/19/2018  8:16 AM   Drainage Description Serosanguinous 11/19/2018  8:16 AM   Odor None 11/19/2018  8:16 AM   Margins Defined edges 11/19/2018  8:16 AM   Kiah-wound Assessment Dry;Pink 11/19/2018  8:16 AM   Non-staged Wound Description Full thickness 11/19/2018  8:16 AM   [place]%Wound Bed 0 9/19/2018  9:19 AM   Red%Wound Bed 40 11/19/2018  8:16 AM   Yellow%Wound Bed 60 11/19/2018  8:16 AM   Black%Wound Bed 0 10/22/2018 10:14 AM   Purple%Wound Bed 0 9/19/2018  9:19 AM   Other%Wound Bed 0 11/5/2018  8:32 AM   Op First Treatment Date 05/14/18 6/4/2018  9:36 AM   Number of days: 189       Percent of Wound Debrided: 100%    Total Surface Area Debrided:  16 sq cm    Diabetic/Pressure/Non Pressure Ulcers only:  Ulcer: Non-Pressure ulcer, fat layer exposed    Bleeding: Minimal    Hemostasis Achieved: by pressure    Procedural Pain: 0  / 10     Post Procedural Pain: 0 / 10     Response to treatment:  Well tolerated by patient. we will apply for Theraskin graft as wound is granulating well.         Plan:     Treatment

## 2018-11-26 ENCOUNTER — HOSPITAL ENCOUNTER (OUTPATIENT)
Dept: WOUND CARE | Age: 53
Discharge: HOME | End: 2018-11-26
Payer: MEDICAID

## 2018-11-26 VITALS
SYSTOLIC BLOOD PRESSURE: 131 MMHG | RESPIRATION RATE: 16 BRPM | TEMPERATURE: 98 F | HEART RATE: 76 BPM | DIASTOLIC BLOOD PRESSURE: 83 MMHG

## 2018-11-26 DIAGNOSIS — I87.311 IDIOPATHIC CHRONIC VENOUS HYPERTENSION OF RIGHT LOWER EXTREMITY WITH ULCER (HCC): ICD-10-CM

## 2018-11-26 DIAGNOSIS — I74.5 THROMBOSIS OF ILIAC ARTERY (HCC): ICD-10-CM

## 2018-11-26 DIAGNOSIS — I82.220 THROMBOSIS OF INFERIOR VENA CAVA (HCC): Primary | ICD-10-CM

## 2018-11-26 DIAGNOSIS — I82.423 THROMBOSIS OF BOTH ILIAC VEINS (HCC): ICD-10-CM

## 2018-11-26 DIAGNOSIS — L97.919 IDIOPATHIC CHRONIC VENOUS HYPERTENSION OF RIGHT LOWER EXTREMITY WITH ULCER (HCC): ICD-10-CM

## 2018-11-26 DIAGNOSIS — I89.0 CHRONIC ACQUIRED LYMPHEDEMA: ICD-10-CM

## 2018-11-26 DIAGNOSIS — Z79.01 CHRONIC ANTICOAGULATION: ICD-10-CM

## 2018-11-26 LAB
INR BLD: 1.54 (ref 0.86–1.14)
PROTHROMBIN TIME: 17.6 SEC (ref 9.8–13)

## 2018-11-26 PROCEDURE — 15271 SKIN SUB GRAFT TRNK/ARM/LEG: CPT

## 2018-11-26 PROCEDURE — 36415 COLL VENOUS BLD VENIPUNCTURE: CPT

## 2018-11-26 PROCEDURE — 97597 DBRDMT OPN WND 1ST 20 CM/<: CPT

## 2018-11-26 PROCEDURE — 85610 PROTHROMBIN TIME: CPT

## 2018-11-26 PROCEDURE — 15271 SKIN SUB GRAFT TRNK/ARM/LEG: CPT | Performed by: SPECIALIST

## 2018-11-26 PROCEDURE — 6370000000 HC RX 637 (ALT 250 FOR IP): Performed by: SPECIALIST

## 2018-11-26 RX ORDER — DOXYCYCLINE HYCLATE 50 MG/1
50 CAPSULE ORAL 2 TIMES DAILY
Qty: 20 CAPSULE | Refills: 0 | Status: SHIPPED | OUTPATIENT
Start: 2018-11-26 | End: 2018-12-06

## 2018-11-26 RX ORDER — LIDOCAINE HYDROCHLORIDE 40 MG/ML
2.5 SOLUTION TOPICAL ONCE
Status: COMPLETED | OUTPATIENT
Start: 2018-11-26 | End: 2018-11-26

## 2018-11-26 RX ADMIN — LIDOCAINE HYDROCHLORIDE 2.5 ML: 40 SOLUTION TOPICAL at 08:30

## 2018-11-26 ASSESSMENT — PAIN SCALES - GENERAL: PAINLEVEL_OUTOF10: 3

## 2018-11-26 ASSESSMENT — PAIN DESCRIPTION - ORIENTATION: ORIENTATION: RIGHT

## 2018-11-26 ASSESSMENT — PAIN DESCRIPTION - PAIN TYPE: TYPE: CHRONIC PAIN

## 2018-11-26 ASSESSMENT — PAIN DESCRIPTION - LOCATION: LOCATION: LEG

## 2018-11-26 NOTE — PROGRESS NOTES
[facility]  Progress Note and Procedure Note      Guerline Jonas  MEDICAL RECORD NUMBER:  [id number]  AGE: 48 y.o. GENDER: male  : 1965  EPISODE DATE:  2018    Subjective:     Chief Complaint   Patient presents with    Wound Check     right lower leg wound         HISTORY of PRESENT ILLNESS HPI     Guerline Jonas is a 48 y.o. male who presents today for wound/ulcer evaluation. 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 Seen at [address] [place] [address] where unsuccessful dilatation of IVC was done. He has been grafted with compression wraps and lymphedema pumps with near complete healing. However, he has gone back to work and is on his feet causing partial loss of graft. He now attempted to wear his compression stockings but he states they are difficult to wear. He remains working 6 days a week and he on his feet. Numerous attempts to encourage lifestyle with work are unsuccessful. He is now wearing his compression stockings    Pain Assessment:  Wound/Ulcer Pain Timing/Severity: none  Quality of pain: N/A  Severity:  0 / 10   Modifying Factors: None  Associated Signs/Symptoms: none    Ulcer Identification:  Ulcer Type: venous  Contributing Factors: edema, lymphedema, obesity and anticoagulation therapy    Objective:      /83   Pulse 76   Temp 98 °F (36.7 °C) (Oral)   Resp 16     Wt Readings from Last 3 Encounters:   18 242 lb 8.1 oz (110 kg)   18 238 lb 1.6 oz (108 kg)   18 238 lb (108 kg)       PHYSICAL EXAM    Extremities: no cyanosis, no clubbing, 2 +nonpitting edema-  right lower extremity with two granulating wounds    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 Thrombosis of both iliac veins ([address]    3. Thrombosis of iliac artery (HCC)    4.  Chronic anticoagulation         Procedure Note  Indications:  Based on my examination of this patient's wound(s) today, sharp excision is required

## 2018-12-03 ENCOUNTER — HOSPITAL ENCOUNTER (OUTPATIENT)
Dept: WOUND CARE | Age: 53
Discharge: HOME | End: 2018-12-03
Payer: MEDICAID

## 2018-12-03 VITALS
RESPIRATION RATE: 16 BRPM | DIASTOLIC BLOOD PRESSURE: 76 MMHG | TEMPERATURE: 98.1 F | HEART RATE: 72 BPM | SYSTOLIC BLOOD PRESSURE: 134 MMHG

## 2018-12-03 DIAGNOSIS — I87.311 IDIOPATHIC CHRONIC VENOUS HYPERTENSION OF RIGHT LOWER EXTREMITY WITH ULCER (HCC): Primary | ICD-10-CM

## 2018-12-03 DIAGNOSIS — I82.220 THROMBOSIS OF INFERIOR VENA CAVA (HCC): ICD-10-CM

## 2018-12-03 DIAGNOSIS — I89.0 CHRONIC ACQUIRED LYMPHEDEMA: ICD-10-CM

## 2018-12-03 DIAGNOSIS — L97.919 IDIOPATHIC CHRONIC VENOUS HYPERTENSION OF RIGHT LOWER EXTREMITY WITH ULCER (HCC): Primary | ICD-10-CM

## 2018-12-03 DIAGNOSIS — Z79.01 CHRONIC ANTICOAGULATION: ICD-10-CM

## 2018-12-03 PROCEDURE — 6370000000 HC RX 637 (ALT 250 FOR IP): Performed by: SPECIALIST

## 2018-12-03 PROCEDURE — 97597 DBRDMT OPN WND 1ST 20 CM/<: CPT | Performed by: SPECIALIST

## 2018-12-03 PROCEDURE — 97597 DBRDMT OPN WND 1ST 20 CM/<: CPT

## 2018-12-03 RX ORDER — DOXYCYCLINE HYCLATE 50 MG/1
50 TABLET, FILM COATED ORAL 2 TIMES DAILY
Qty: 20 TABLET | Refills: 0 | Status: SHIPPED | OUTPATIENT
Start: 2018-12-03 | End: 2018-12-31

## 2018-12-03 RX ORDER — LIDOCAINE HYDROCHLORIDE 40 MG/ML
2.5 SOLUTION TOPICAL ONCE
Status: COMPLETED | OUTPATIENT
Start: 2018-12-03 | End: 2018-12-03

## 2018-12-03 RX ADMIN — LIDOCAINE HYDROCHLORIDE 2.5 ML: 40 SOLUTION TOPICAL at 08:20

## 2018-12-03 ASSESSMENT — PAIN DESCRIPTION - FREQUENCY: FREQUENCY: INTERMITTENT

## 2018-12-03 ASSESSMENT — PAIN DESCRIPTION - PAIN TYPE: TYPE: ACUTE PAIN;CHRONIC PAIN

## 2018-12-03 ASSESSMENT — PAIN DESCRIPTION - ORIENTATION: ORIENTATION: RIGHT

## 2018-12-03 ASSESSMENT — PAIN DESCRIPTION - PROGRESSION: CLINICAL_PROGRESSION: NOT CHANGED

## 2018-12-03 ASSESSMENT — PAIN SCALES - GENERAL: PAINLEVEL_OUTOF10: 3

## 2018-12-03 ASSESSMENT — PAIN DESCRIPTION - ONSET: ONSET: GRADUAL

## 2018-12-03 ASSESSMENT — PAIN DESCRIPTION - DESCRIPTORS: DESCRIPTORS: ACHING;BURNING

## 2018-12-03 ASSESSMENT — PAIN DESCRIPTION - LOCATION: LOCATION: LEG

## 2018-12-03 NOTE — PROGRESS NOTES
Orders and Discharge Instructions  [facility] [address] [address]  Telephone: [phone]    NAME:  Rayshawn Sam  YOB: 1965  MEDICAL RECORD NUMBER:  [id number]  DATE:  12/3/2018    Wash hands with soap and water prior to and after every dressing change. Wound Cleansing:   · Do not scrub or use excessive force. · With each dressing change, rinse wounds with 0.9% Saline. (May use wound wash or soft contact solution. Both can be purchased at a local drug store). · If unable to obtain saline, may use a gentle soap and water. · Keep wounds dry in the shower unless otherwise instructed by the physician. Topical Treatments:  Do not apply lotions, creams, or ointments to wound bed unless directed. [] Apply moisturizing lotion to skin surrounding the wound prior to dressing change.  [] Apply antifungal ointment to skin surrounding the wound prior to dressing change.  [] Apply thin film of moisture barrier ointment to skin immediately around wound.   [] Other:      Dressings:           Wound Location: theraskin graft left intact     [x] Apply Primary Dressing to wound:       [] Foam/Foam with Border(i.e Mepilex) [] Non-adherent (i.e.Mepitel)   [] Alginate with Silver (i.e. Silvercel)  [] Alginate (i.e. Aquacel)   [] Collagen (i.e. Puracol)   [] Collagen with Silver(i.e. Candice)     [] Hydrocolloid    [] Hydrafera Blue moistened with saline   [] MediHoney Paste/Gel   [] Hydrogel    [] Endoform      [] Santyl covered with gauze moisten with saline    [] Bactroban/Mupirocin   [] Polysporin/Neosporin  [] Other:      Pack wound loosely with: [] Iodoform   [] Plain Packing  [] Saline \"wet to dry\"      [x] Cover and Secure with:     [x] Dry Gauze  [] ABD [] Cast padding [] Kerlix or Lyndsey rolled gauze   [] Coban [] Ace Wrap  [] Ace Wrap from forefoot to just below the knee  [] Paper Tape [] Medipore Tape  [x] Other: mepitel,

## 2018-12-10 ENCOUNTER — TELEPHONE (OUTPATIENT)
Dept: WOUND CARE | Age: 53
End: 2018-12-10

## 2018-12-12 ENCOUNTER — HOSPITAL ENCOUNTER (OUTPATIENT)
Dept: WOUND CARE | Age: 53
Discharge: HOME | End: 2018-12-12
Payer: MEDICAID

## 2018-12-12 VITALS
SYSTOLIC BLOOD PRESSURE: 138 MMHG | TEMPERATURE: 98 F | HEART RATE: 74 BPM | DIASTOLIC BLOOD PRESSURE: 88 MMHG | RESPIRATION RATE: 18 BRPM

## 2018-12-12 DIAGNOSIS — I82.220 THROMBOSIS OF INFERIOR VENA CAVA (HCC): Primary | ICD-10-CM

## 2018-12-12 DIAGNOSIS — I74.5 THROMBOSIS OF ILIAC ARTERY (HCC): ICD-10-CM

## 2018-12-12 DIAGNOSIS — I82.423 THROMBOSIS OF BOTH ILIAC VEINS (HCC): ICD-10-CM

## 2018-12-12 DIAGNOSIS — I89.0 CHRONIC ACQUIRED LYMPHEDEMA: ICD-10-CM

## 2018-12-12 DIAGNOSIS — L97.919 IDIOPATHIC CHRONIC VENOUS HYPERTENSION OF RIGHT LOWER EXTREMITY WITH ULCER (HCC): ICD-10-CM

## 2018-12-12 DIAGNOSIS — I87.311 IDIOPATHIC CHRONIC VENOUS HYPERTENSION OF RIGHT LOWER EXTREMITY WITH ULCER (HCC): ICD-10-CM

## 2018-12-12 LAB
INR BLD: 1.38 (ref 0.86–1.14)
PROTHROMBIN TIME: 15.7 SEC (ref 9.8–13)

## 2018-12-12 PROCEDURE — 15271 SKIN SUB GRAFT TRNK/ARM/LEG: CPT

## 2018-12-12 PROCEDURE — 85610 PROTHROMBIN TIME: CPT

## 2018-12-12 PROCEDURE — 6370000000 HC RX 637 (ALT 250 FOR IP): Performed by: SPECIALIST

## 2018-12-12 PROCEDURE — 97597 DBRDMT OPN WND 1ST 20 CM/<: CPT

## 2018-12-12 PROCEDURE — 15271 SKIN SUB GRAFT TRNK/ARM/LEG: CPT | Performed by: SPECIALIST

## 2018-12-12 PROCEDURE — 36415 COLL VENOUS BLD VENIPUNCTURE: CPT

## 2018-12-12 RX ORDER — LIDOCAINE HYDROCHLORIDE 40 MG/ML
2.5 SOLUTION TOPICAL ONCE
Status: COMPLETED | OUTPATIENT
Start: 2018-12-12 | End: 2018-12-12

## 2018-12-12 RX ORDER — DOXYCYCLINE HYCLATE 50 MG/1
50 CAPSULE ORAL 2 TIMES DAILY
Qty: 20 CAPSULE | Refills: 0 | Status: SHIPPED | OUTPATIENT
Start: 2018-12-12 | End: 2018-12-22

## 2018-12-12 RX ADMIN — LIDOCAINE HYDROCHLORIDE 2.5 ML: 40 SOLUTION TOPICAL at 09:55

## 2018-12-12 ASSESSMENT — PAIN SCALES - GENERAL: PAINLEVEL_OUTOF10: 2

## 2018-12-12 ASSESSMENT — PAIN DESCRIPTION - DESCRIPTORS: DESCRIPTORS: ACHING

## 2018-12-12 ASSESSMENT — PAIN DESCRIPTION - LOCATION: LOCATION: LEG

## 2018-12-12 ASSESSMENT — PAIN DESCRIPTION - ORIENTATION: ORIENTATION: RIGHT

## 2018-12-12 NOTE — PROGRESS NOTES
[address] [facility] [place]  Progress Note and Procedure Note      Lizbeth Crockett  MEDICAL RECORD NUMBER:  [id number]  AGE: 48 y.o. GENDER: male  : 1965  EPISODE DATE:  2018    Subjective:     Chief Complaint   Patient presents with    Wound Check     f/u right lower leg wound         HISTORY of PRESENT ILLNESS HPI     Lizbeth Crockett is a 48 y.o. male who presents today for wound/ulcer evaluation. History of Wound Context:  Lizbeth Crockett is a 48 y.o. 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 Seen at [address] [place] [address] where unsuccessful dilatation of IVC was done. He has been grafted with compression wraps and lymphedema pumps with near complete healing. However, he has gone back to work and is on his feet causing partial loss of graft. He now attempted to wear his compression stockings but he states they are difficult to wear. He remains working 6 days a week and he on his feet. Numerous attempts to encourage lifestyle with work are unsuccessful. He is now wearing his compression stockings who presents today for wound/ulcer evaluation He has undergone one application of Theraskin allograft. Pain Assessment:  Wound/Ulcer Pain Timing/Severity: none  Quality of pain: N/A  Severity:  0 / 10   Modifying Factors: None  Associated Signs/Symptoms: none    Ulcer Identification:  Ulcer Type: venous  Contributing Factors: edema, lymphedema, obesity and anticoagulation therapy    Objective:      /88   Pulse 74   Temp 98 °F (36.7 °C) (Oral)   Resp 18     Wt Readings from Last 3 Encounters:   18 242 lb 8.1 oz (110 kg)   18 238 lb 1.6 oz (108 kg)   18 238 lb (108 kg)       PHYSICAL EXAM    Extremities: no cyanosis, no clubbing and no cyanosis, no clubbing and 2 +nonpitting edema-  bilateral with 2 wounds RLE, with Theraskin allograft in place with near complete absence of allograft,  See all measurements    Assessment:      1.  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 They should be applied to affected leg(s) from mid foot to knee making sure to cover the heel. Remove every night before going to bed. [x] Elevate leg(s) above the level of the heart for 30 minutes 4-5 times a day and/or when sitting. [x] Avoid prolonged standing in one place. Lymphedema Therapy:   [x] Wear Lymphedema pumps twice a day at settings prescribed by your physician. [x]Elevate leg(s) above the level of the heart for 30 minutes 4-5 times a day and/or when sitting. [x] Avoid prolonged standing in one place. Dietary:  Important dietary reminders:  1. Increase Protein intake (i.e. Lean meats, fish, eggs, legumes, and yogurt)  2. No added salt  3. If diabetic, follow a diabetic diet and check glucose prior to meals or as instructed by your physician. Dietary Supplements:  [] Ensure EnLive [] Papito [] 30ml ProMod/ProStat   [] Other:   Take supplements twice a day or as directed as followed:       Return Appointment:  [] Wound and dressing supply provider:   [] ECF or Home Healthcare:  [] Wound reassessment :    [x] Return Appointment: With Dr. Precious Colbert   Next Friday      Your nurse  is:  Princess Coppola     Electronically signed by Lily Tejada RN on 12/12/2018 at 10:19 R Aileen Cardoso 8 Information: Should you experience any significant changes in your wound(s) or have questions about your wound care, please contact the [address] [place] [address] at [phone] Monday-Friday from 8:00 am - 4:30 pm except for Wednesdays which hours are from 8:00 am - 2:00 pm.   If you need help with your wound outside these hours and cannot wait until we are again available, contact your PCP or go to the hospital emergency room. PLEASE NOTE: IF YOU ARE UNABLE TO OBTAIN WOUND SUPPLIES, CONTINUE TO USE THE SUPPLIES YOU HAVE AVAILABLE UNTIL YOU ARE ABLE TO REACH US.  IT IS MOST IMPORTANT TO KEEP THE WOUND COVERED AT ALL TIMES. Physician orders by:     [] Dr Tram Jones [] Dr Marly Smith    [] Dr Ellen Miranda     [] Dr Tono Britton   [x] Dr Clifford Rincon  [] Dr Leela Quevedo  [] Dr Princess Feliciano       Physician Signature:__________________________________        The information contained in the After Visit Summary has been reviewed with me, the patient and/or responsible adult, by my health care provider(s). I had the opportunity to ask questions regarding this information.      [] Patient unable to sign Discharge Instructions given to ECF/Transportation/POA        Electronically signed by Emperatriz Tejeda MD on 12/12/2018 at 11:33 AM

## 2018-12-14 ENCOUNTER — TELEPHONE (OUTPATIENT)
Dept: FAMILY MEDICINE CLINIC | Age: 53
End: 2018-12-14

## 2018-12-14 ENCOUNTER — OFFICE VISIT (OUTPATIENT)
Dept: FAMILY MEDICINE CLINIC | Age: 53
End: 2018-12-14
Payer: MEDICAID

## 2018-12-14 ENCOUNTER — ANTI-COAG VISIT (OUTPATIENT)
Dept: FAMILY MEDICINE CLINIC | Age: 53
End: 2018-12-14

## 2018-12-14 VITALS
BODY MASS INDEX: 36.12 KG/M2 | DIASTOLIC BLOOD PRESSURE: 87 MMHG | TEMPERATURE: 98 F | HEART RATE: 69 BPM | SYSTOLIC BLOOD PRESSURE: 134 MMHG | OXYGEN SATURATION: 100 % | RESPIRATION RATE: 12 BRPM | WEIGHT: 227.4 LBS

## 2018-12-14 DIAGNOSIS — I82.5Z9 CHRONIC VENOUS EMBOLISM AND THROMBOSIS OF DEEP VESSELS OF DISTAL LOWER EXTREMITY, UNSPECIFIED LATERALITY (HCC): Primary | ICD-10-CM

## 2018-12-14 DIAGNOSIS — L97.919 IDIOPATHIC CHRONIC VENOUS HYPERTENSION OF RIGHT LOWER EXTREMITY WITH ULCER (HCC): Primary | ICD-10-CM

## 2018-12-14 DIAGNOSIS — Z79.01 CHRONIC ANTICOAGULATION: ICD-10-CM

## 2018-12-14 DIAGNOSIS — I89.0 CHRONIC ACQUIRED LYMPHEDEMA: ICD-10-CM

## 2018-12-14 DIAGNOSIS — Z12.11 COLON CANCER SCREENING: ICD-10-CM

## 2018-12-14 DIAGNOSIS — L97.919 IDIOPATHIC CHRONIC VENOUS HYPERTENSION OF RIGHT LOWER EXTREMITY WITH ULCER (HCC): ICD-10-CM

## 2018-12-14 DIAGNOSIS — I87.311 IDIOPATHIC CHRONIC VENOUS HYPERTENSION OF RIGHT LOWER EXTREMITY WITH ULCER (HCC): Primary | ICD-10-CM

## 2018-12-14 DIAGNOSIS — I87.311 IDIOPATHIC CHRONIC VENOUS HYPERTENSION OF RIGHT LOWER EXTREMITY WITH ULCER (HCC): ICD-10-CM

## 2018-12-14 PROCEDURE — G8427 DOCREV CUR MEDS BY ELIG CLIN: HCPCS | Performed by: FAMILY MEDICINE

## 2018-12-14 PROCEDURE — G8417 CALC BMI ABV UP PARAM F/U: HCPCS | Performed by: FAMILY MEDICINE

## 2018-12-14 PROCEDURE — G8598 ASA/ANTIPLAT THER USED: HCPCS | Performed by: FAMILY MEDICINE

## 2018-12-14 PROCEDURE — 3017F COLORECTAL CA SCREEN DOC REV: CPT | Performed by: FAMILY MEDICINE

## 2018-12-14 PROCEDURE — 93793 ANTICOAG MGMT PT WARFARIN: CPT | Performed by: FAMILY MEDICINE

## 2018-12-14 PROCEDURE — 99214 OFFICE O/P EST MOD 30 MIN: CPT | Performed by: FAMILY MEDICINE

## 2018-12-14 PROCEDURE — 1036F TOBACCO NON-USER: CPT | Performed by: FAMILY MEDICINE

## 2018-12-14 PROCEDURE — G8484 FLU IMMUNIZE NO ADMIN: HCPCS | Performed by: FAMILY MEDICINE

## 2018-12-14 ASSESSMENT — PATIENT HEALTH QUESTIONNAIRE - PHQ9
SUM OF ALL RESPONSES TO PHQ9 QUESTIONS 1 & 2: 0
SUM OF ALL RESPONSES TO PHQ QUESTIONS 1-9: 0
1. LITTLE INTEREST OR PLEASURE IN DOING THINGS: 0
2. FEELING DOWN, DEPRESSED OR HOPELESS: 0
SUM OF ALL RESPONSES TO PHQ QUESTIONS 1-9: 0

## 2018-12-14 NOTE — PROGRESS NOTES
Wt Readings from Last 3 Encounters:   12/14/18 227 lb 6.4 oz (103.1 kg)   04/26/18 242 lb 8.1 oz (110 kg)   04/24/18 238 lb 1.6 oz (108 kg)     BP Readings from Last 3 Encounters:   12/14/18 134/87   12/12/18 138/88   12/03/18 134/76       Patient Active Problem List   Diagnosis    Pain and swelling of right lower leg    Chronic acquired lymphedema    Idiopathic chronic venous hypertension of right lower extremity with ulcer (HCC)    Lower leg DVT (deep venous thromboembolism), chronic (HCC)    Chronic anticoagulation    Thrombosis of iliac artery (HCC)    Thrombosis of both iliac veins (HCC)    Thrombosis of inferior vena cava (HCC)    Localized edema    Stasis ulcer (HCC)    Leg ulcer, right, with unspecified severity ([facility] [address]    Pseudomonas infection    Fever    Colon cancer screening         There is no immunization history on file for this patient. Past Medical History:   Diagnosis Date    DVT (deep venous thrombosis) (HCC)     Pain and swelling of right lower leg      Past Surgical History:   Procedure Laterality Date    BALLOON ANGIOPLASTY, ARTERY Right 12/19/2017    at [place] [id number]. Right      Family History   Problem Relation Age of Onset    Diabetes Mother     Heart Attack Father      Social History     Social History    Marital status:      Spouse name: N/A    Number of children: N/A    Years of education: N/A     Occupational History    Not on file. Social History Main Topics    Smoking status: Never Smoker    Smokeless tobacco: Never Used    Alcohol use No    Drug use: No    Sexual activity: Yes     Other Topics Concern    Not on file     Social History Narrative    No narrative on file       O: /87   Pulse 69   Temp 98 °F (36.7 °C)   Resp 12   Wt 227 lb 6.4 oz (103.1 kg)   SpO2 100%   BMI 36.12 kg/m²   Physical Exam  GEN: No acute distress, cooperative, well nourished, alert.    HEENT: PEERLA, EOMI , normocephalic/atraumatic, nares and oropharynx clear. Mucus membranes normal, Tympanic membranes clear bilaterally. Neck: soft, supple, no thyromegaly, mass, no Lymphadenopathy  CV: Regular rate and rhythm, no murmur, rubs, gallops. No edema. Resp: Clear to auscultation bilaterally good air entry bilaterally  No crackles, wheeze. Breathing comfortably. Psych: normal affect. Neuro: AOx3      ASSESSMENT   Diagnosis Orders   1. Idiopathic chronic venous hypertension of right lower extremity with ulcer ([facility] [address]     2. Colon cancer screening       Increase dose of weekly warfarin. Look to see if insurance will cover visits to [facility] anticoagulation clinic. Patient declines vaccinations today. #2: prefer colonocoscopy rather than stool cards as warfarin may possibly lead to false positive. He would rather think about this at this time. PLAN          See rest of plan under patient instructions. No Follow-up on file. Patient Instructions     Since the INR is still below 2, then take two 5 mg tablets on Saturdays. See the dosing below. Plan to check INR next week either Wednesday or Friday. INR goal:   2.0-3.0   TTR:   56.1 % (1.1 y)   Today's INR:   1.38! (12/12/2018)   Warfarin maintenance plan:   10 mg (5 mg x 2) on Sat; 5 mg (5 mg x 1) on Mon, Wed, Fri; 7.5 mg (5 mg x 1.5) all other days   Weekly warfarin total:   47.5 mg   Weekly max warfarin dose:   50 mg   Plan last modified:   Laine Esquivel DO (12/14/2018)   Next INR check:   12/20/2018   Target end date:   5/1/2022      Anticoagulation Episode Summary     INR check location:      Preferred lab:      Send INR reminders to:   [facility] Laquita [place]   Comments:          Patient Education   Let your PCP know if you wish to proceed with colonoscopy. There are 2 doctors in [place] that do this at [facility]. Learning About Colonoscopy  What is a colonoscopy? A colonoscopy is a test (also called a procedure) that lets a doctor look inside your large intestine.

## 2018-12-16 PROBLEM — Z12.11 COLON CANCER SCREENING: Status: ACTIVE | Noted: 2018-12-16

## 2018-12-19 DIAGNOSIS — I74.5 THROMBOSIS OF ILIAC ARTERY (HCC): ICD-10-CM

## 2018-12-19 DIAGNOSIS — I82.220 THROMBOSIS OF INFERIOR VENA CAVA (HCC): ICD-10-CM

## 2018-12-19 DIAGNOSIS — I82.423 THROMBOSIS OF BOTH ILIAC VEINS (HCC): ICD-10-CM

## 2018-12-19 LAB
INR BLD: 1.58 (ref 0.86–1.14)
PROTHROMBIN TIME: 18 SEC (ref 9.8–13)

## 2018-12-20 ENCOUNTER — TELEPHONE (OUTPATIENT)
Dept: PHARMACY | Age: 53
End: 2018-12-20

## 2018-12-21 ENCOUNTER — HOSPITAL ENCOUNTER (OUTPATIENT)
Dept: WOUND CARE | Age: 53
Discharge: HOME | End: 2018-12-21
Payer: MEDICAID

## 2018-12-21 VITALS
HEART RATE: 98 BPM | SYSTOLIC BLOOD PRESSURE: 131 MMHG | RESPIRATION RATE: 16 BRPM | TEMPERATURE: 98.7 F | DIASTOLIC BLOOD PRESSURE: 80 MMHG

## 2018-12-21 DIAGNOSIS — L97.919 IDIOPATHIC CHRONIC VENOUS HYPERTENSION OF RIGHT LOWER EXTREMITY WITH ULCER (HCC): Primary | ICD-10-CM

## 2018-12-21 DIAGNOSIS — I82.220 THROMBOSIS OF INFERIOR VENA CAVA (HCC): ICD-10-CM

## 2018-12-21 DIAGNOSIS — I82.423 THROMBOSIS OF BOTH ILIAC VEINS (HCC): ICD-10-CM

## 2018-12-21 DIAGNOSIS — I87.311 IDIOPATHIC CHRONIC VENOUS HYPERTENSION OF RIGHT LOWER EXTREMITY WITH ULCER (HCC): Primary | ICD-10-CM

## 2018-12-21 PROCEDURE — 97597 DBRDMT OPN WND 1ST 20 CM/<: CPT | Performed by: SPECIALIST

## 2018-12-21 PROCEDURE — 97597 DBRDMT OPN WND 1ST 20 CM/<: CPT

## 2018-12-21 PROCEDURE — 6370000000 HC RX 637 (ALT 250 FOR IP): Performed by: SPECIALIST

## 2018-12-21 RX ORDER — DOXYCYCLINE HYCLATE 100 MG
100 TABLET ORAL 2 TIMES DAILY
Qty: 62 TABLET | Refills: 0 | Status: SHIPPED | OUTPATIENT
Start: 2018-12-21 | End: 2019-01-21

## 2018-12-21 RX ORDER — LIDOCAINE HYDROCHLORIDE 40 MG/ML
SOLUTION TOPICAL ONCE
Status: COMPLETED | OUTPATIENT
Start: 2018-12-21 | End: 2018-12-21

## 2018-12-21 RX ADMIN — LIDOCAINE HYDROCHLORIDE: 40 SOLUTION TOPICAL at 08:44

## 2018-12-21 NOTE — PROGRESS NOTES
Layer Spandagrip       []Right Leg  []Left Leg     Strength:       []Low compression 5-10 mm/Hg      []Medium compression 10-20 mm/Hg     []High compression  20-30 mm/Hg    Apply every morning immediately when getting up. They should be applied to affected leg(s) from mid foot to knee making sure to cover the heel. Remove every night before going to bed. [x] Elevate leg(s) above the level of the heart for 30 minutes 4-5 times a day and/or when sitting. [x] Avoid prolonged standing in one place. Lymphedema Therapy:   [x] Wear Lymphedema pumps twice a day at settings prescribed by your physician. [x]Elevate leg(s) above the level of the heart for 30 minutes 4-5 times a day and/or when sitting. [x] Avoid prolonged standing in one place. Dietary:  Important dietary reminders:  1. Increase Protein intake (i.e. Lean meats, fish, eggs, legumes, and yogurt)  2. No added salt  3. If diabetic, follow a diabetic diet and check glucose prior to meals or as instructed by your physician. Return Appointment:  [] Wound and dressing supply provider:   [] ECF or Home Healthcare:  [] Wound reassessment :    [x] Return Appointment: With Dr. Belem Reardon   in  [facility])      Your nurse  is:  Nilsa Haskins     Electronically signed by Brett Morejon RN on 12/21/2018 at [facility] Information: Should you experience any significant changes in your wound(s) or have questions about your wound care, please contact the [address] [place] [address] at [phone] Monday-Friday from 8:00 am - 4:30 pm except for Wednesdays which hours are from 8:00 am - 2:00 pm.   If you need help with your wound outside these hours and cannot wait until we are again available, contact your PCP or go to the hospital emergency room. PLEASE NOTE: IF YOU ARE UNABLE TO OBTAIN WOUND SUPPLIES, CONTINUE TO USE THE SUPPLIES YOU HAVE AVAILABLE UNTIL YOU ARE ABLE TO REACH US.  IT IS MOST IMPORTANT TO KEEP

## 2018-12-31 ENCOUNTER — HOSPITAL ENCOUNTER (OUTPATIENT)
Dept: WOUND CARE | Age: 53
Discharge: HOME | End: 2018-12-31
Payer: MEDICAID

## 2018-12-31 VITALS
RESPIRATION RATE: 18 BRPM | HEART RATE: 87 BPM | DIASTOLIC BLOOD PRESSURE: 78 MMHG | SYSTOLIC BLOOD PRESSURE: 134 MMHG | TEMPERATURE: 97.7 F

## 2018-12-31 DIAGNOSIS — M79.661 PAIN AND SWELLING OF RIGHT LOWER LEG: ICD-10-CM

## 2018-12-31 DIAGNOSIS — Z79.01 CHRONIC ANTICOAGULATION: ICD-10-CM

## 2018-12-31 DIAGNOSIS — I82.220 THROMBOSIS OF INFERIOR VENA CAVA (HCC): ICD-10-CM

## 2018-12-31 DIAGNOSIS — I87.311 IDIOPATHIC CHRONIC VENOUS HYPERTENSION OF RIGHT LOWER EXTREMITY WITH ULCER (HCC): ICD-10-CM

## 2018-12-31 DIAGNOSIS — L97.919 IDIOPATHIC CHRONIC VENOUS HYPERTENSION OF RIGHT LOWER EXTREMITY WITH ULCER (HCC): ICD-10-CM

## 2018-12-31 DIAGNOSIS — M79.89 PAIN AND SWELLING OF RIGHT LOWER LEG: ICD-10-CM

## 2018-12-31 DIAGNOSIS — R60.0 LOCALIZED EDEMA: Primary | ICD-10-CM

## 2018-12-31 PROCEDURE — 15271 SKIN SUB GRAFT TRNK/ARM/LEG: CPT

## 2018-12-31 PROCEDURE — 97597 DBRDMT OPN WND 1ST 20 CM/<: CPT

## 2018-12-31 PROCEDURE — 15271 SKIN SUB GRAFT TRNK/ARM/LEG: CPT | Performed by: SPECIALIST

## 2018-12-31 PROCEDURE — 6370000000 HC RX 637 (ALT 250 FOR IP): Performed by: SPECIALIST

## 2018-12-31 RX ORDER — LIDOCAINE HYDROCHLORIDE 40 MG/ML
2.5 SOLUTION TOPICAL ONCE
Status: COMPLETED | OUTPATIENT
Start: 2018-12-31 | End: 2018-12-31

## 2018-12-31 RX ADMIN — LIDOCAINE HYDROCHLORIDE 2.5 ML: 40 SOLUTION TOPICAL at 10:23

## 2018-12-31 ASSESSMENT — PAIN DESCRIPTION - DESCRIPTORS: DESCRIPTORS: CONSTANT

## 2018-12-31 ASSESSMENT — PAIN DESCRIPTION - PAIN TYPE: TYPE: ACUTE PAIN

## 2018-12-31 ASSESSMENT — PAIN DESCRIPTION - ONSET: ONSET: ON-GOING

## 2018-12-31 ASSESSMENT — PAIN DESCRIPTION - PROGRESSION: CLINICAL_PROGRESSION: NOT CHANGED

## 2018-12-31 ASSESSMENT — PAIN DESCRIPTION - LOCATION: LOCATION: LEG

## 2018-12-31 ASSESSMENT — PAIN DESCRIPTION - ORIENTATION: ORIENTATION: LEFT

## 2019-01-04 ENCOUNTER — TELEPHONE (OUTPATIENT)
Dept: WOUND CARE | Age: 54
End: 2019-01-04

## 2019-01-04 ENCOUNTER — HOSPITAL ENCOUNTER (OUTPATIENT)
Dept: VASCULAR LAB | Age: 54
Discharge: HOME | End: 2019-01-04
Payer: MEDICAID

## 2019-01-04 ENCOUNTER — TELEPHONE (OUTPATIENT)
Dept: FAMILY MEDICINE CLINIC | Age: 54
End: 2019-01-04

## 2019-01-04 ENCOUNTER — ANTI-COAG VISIT (OUTPATIENT)
Dept: PHARMACY | Age: 54
End: 2019-01-04
Payer: MEDICAID

## 2019-01-04 DIAGNOSIS — M79.89 PAIN AND SWELLING OF RIGHT LOWER LEG: ICD-10-CM

## 2019-01-04 DIAGNOSIS — M79.661 PAIN AND SWELLING OF RIGHT LOWER LEG: ICD-10-CM

## 2019-01-04 DIAGNOSIS — I82.5Z9 CHRONIC VENOUS EMBOLISM AND THROMBOSIS OF DEEP VESSELS OF DISTAL LOWER EXTREMITY, UNSPECIFIED LATERALITY (HCC): ICD-10-CM

## 2019-01-04 LAB — INTERNATIONAL NORMALIZATION RATIO, POC: 2.1

## 2019-01-04 PROCEDURE — 85610 PROTHROMBIN TIME: CPT

## 2019-01-04 PROCEDURE — 99213 OFFICE O/P EST LOW 20 MIN: CPT

## 2019-01-04 PROCEDURE — 93971 EXTREMITY STUDY: CPT

## 2019-01-07 ENCOUNTER — HOSPITAL ENCOUNTER (OUTPATIENT)
Dept: WOUND CARE | Age: 54
Discharge: HOME | End: 2019-01-07
Payer: MEDICAID

## 2019-01-07 VITALS
DIASTOLIC BLOOD PRESSURE: 81 MMHG | RESPIRATION RATE: 16 BRPM | HEART RATE: 69 BPM | SYSTOLIC BLOOD PRESSURE: 131 MMHG | TEMPERATURE: 97.9 F

## 2019-01-07 DIAGNOSIS — L97.919 IDIOPATHIC CHRONIC VENOUS HYPERTENSION OF RIGHT LOWER EXTREMITY WITH ULCER (HCC): ICD-10-CM

## 2019-01-07 DIAGNOSIS — Z79.01 CHRONIC ANTICOAGULATION: ICD-10-CM

## 2019-01-07 DIAGNOSIS — I82.5Z9 CHRONIC VENOUS EMBOLISM AND THROMBOSIS OF DEEP VESSELS OF DISTAL LOWER EXTREMITY, UNSPECIFIED LATERALITY (HCC): ICD-10-CM

## 2019-01-07 DIAGNOSIS — I82.423 THROMBOSIS OF BOTH ILIAC VEINS (HCC): ICD-10-CM

## 2019-01-07 DIAGNOSIS — I82.4Z2 ACUTE VENOUS EMBOLISM AND THROMBOSIS OF DEEP VESSELS OF DISTAL END OF LEFT LOWER EXTREMITY (HCC): ICD-10-CM

## 2019-01-07 DIAGNOSIS — R60.0 LOCALIZED EDEMA: ICD-10-CM

## 2019-01-07 DIAGNOSIS — I89.0 CHRONIC ACQUIRED LYMPHEDEMA: Primary | ICD-10-CM

## 2019-01-07 DIAGNOSIS — I82.220 THROMBOSIS OF INFERIOR VENA CAVA (HCC): ICD-10-CM

## 2019-01-07 DIAGNOSIS — I82.5Z1 CHRONIC VENOUS EMBOLISM AND THROMBOSIS OF DEEP VESSELS OF DISTAL END OF RIGHT LOWER EXTREMITY (HCC): ICD-10-CM

## 2019-01-07 DIAGNOSIS — I87.311 IDIOPATHIC CHRONIC VENOUS HYPERTENSION OF RIGHT LOWER EXTREMITY WITH ULCER (HCC): ICD-10-CM

## 2019-01-07 PROBLEM — I82.4Z9 LOWER LEG DVT (DEEP VENOUS THROMBOEMBOLISM), ACUTE (HCC): Status: ACTIVE | Noted: 2019-01-07

## 2019-01-07 PROCEDURE — 11042 DBRDMT SUBQ TIS 1ST 20SQCM/<: CPT | Performed by: SPECIALIST

## 2019-01-07 PROCEDURE — 6370000000 HC RX 637 (ALT 250 FOR IP): Performed by: SPECIALIST

## 2019-01-07 PROCEDURE — 11042 DBRDMT SUBQ TIS 1ST 20SQCM/<: CPT

## 2019-01-07 RX ORDER — LIDOCAINE HYDROCHLORIDE 40 MG/ML
2.5 SOLUTION TOPICAL ONCE
Status: COMPLETED | OUTPATIENT
Start: 2019-01-07 | End: 2019-01-07

## 2019-01-07 RX ADMIN — LIDOCAINE HYDROCHLORIDE 2.5 ML: 40 SOLUTION TOPICAL at 09:02

## 2019-01-07 ASSESSMENT — PAIN DESCRIPTION - PAIN TYPE: TYPE: ACUTE PAIN

## 2019-01-07 ASSESSMENT — PAIN DESCRIPTION - DESCRIPTORS: DESCRIPTORS: ACHING

## 2019-01-07 ASSESSMENT — PAIN SCALES - GENERAL: PAINLEVEL_OUTOF10: 1

## 2019-01-07 ASSESSMENT — PAIN DESCRIPTION - LOCATION: LOCATION: LEG

## 2019-01-07 ASSESSMENT — PAIN DESCRIPTION - ORIENTATION: ORIENTATION: RIGHT

## 2019-01-07 ASSESSMENT — PAIN DESCRIPTION - ONSET: ONSET: ON-GOING

## 2019-01-07 ASSESSMENT — PAIN DESCRIPTION - FREQUENCY: FREQUENCY: CONTINUOUS

## 2019-01-08 RX ORDER — WARFARIN SODIUM 5 MG/1
TABLET ORAL
Qty: 90 TABLET | Refills: 3 | Status: SHIPPED | OUTPATIENT
Start: 2019-01-08 | End: 2019-02-27 | Stop reason: SDUPTHER

## 2019-01-14 ENCOUNTER — HOSPITAL ENCOUNTER (OUTPATIENT)
Dept: WOUND CARE | Age: 54
Discharge: HOME | End: 2019-01-14
Payer: MEDICAID

## 2019-01-14 VITALS
SYSTOLIC BLOOD PRESSURE: 140 MMHG | DIASTOLIC BLOOD PRESSURE: 72 MMHG | TEMPERATURE: 98.7 F | HEART RATE: 76 BPM | RESPIRATION RATE: 16 BRPM

## 2019-01-14 DIAGNOSIS — I89.0 CHRONIC ACQUIRED LYMPHEDEMA: ICD-10-CM

## 2019-01-14 DIAGNOSIS — I87.311 IDIOPATHIC CHRONIC VENOUS HYPERTENSION OF RIGHT LOWER EXTREMITY WITH ULCER (HCC): ICD-10-CM

## 2019-01-14 DIAGNOSIS — L97.919 IDIOPATHIC CHRONIC VENOUS HYPERTENSION OF RIGHT LOWER EXTREMITY WITH ULCER (HCC): ICD-10-CM

## 2019-01-14 DIAGNOSIS — I82.220 THROMBOSIS OF INFERIOR VENA CAVA (HCC): ICD-10-CM

## 2019-01-14 DIAGNOSIS — Z79.01 CHRONIC ANTICOAGULATION: Primary | ICD-10-CM

## 2019-01-14 DIAGNOSIS — I82.423 THROMBOSIS OF BOTH ILIAC VEINS (HCC): ICD-10-CM

## 2019-01-14 PROCEDURE — 11042 DBRDMT SUBQ TIS 1ST 20SQCM/<: CPT | Performed by: SPECIALIST

## 2019-01-14 PROCEDURE — 11045 DBRDMT SUBQ TISS EACH ADDL: CPT

## 2019-01-14 PROCEDURE — 6370000000 HC RX 637 (ALT 250 FOR IP): Performed by: SPECIALIST

## 2019-01-14 PROCEDURE — 11042 DBRDMT SUBQ TIS 1ST 20SQCM/<: CPT

## 2019-01-14 PROCEDURE — 11045 DBRDMT SUBQ TISS EACH ADDL: CPT | Performed by: SPECIALIST

## 2019-01-14 RX ORDER — CIPROFLOXACIN 500 MG/1
500 TABLET, FILM COATED ORAL 2 TIMES DAILY
COMMUNITY
End: 2019-01-21

## 2019-01-14 RX ORDER — CIPROFLOXACIN 500 MG/1
500 TABLET, FILM COATED ORAL 2 TIMES DAILY
Qty: 20 TABLET | Refills: 0 | Status: SHIPPED | OUTPATIENT
Start: 2019-01-14 | End: 2019-01-25 | Stop reason: ALTCHOICE

## 2019-01-14 RX ORDER — LIDOCAINE HYDROCHLORIDE 40 MG/ML
2.5 SOLUTION TOPICAL ONCE
Status: COMPLETED | OUTPATIENT
Start: 2019-01-14 | End: 2019-01-14

## 2019-01-14 RX ADMIN — LIDOCAINE HYDROCHLORIDE 2.5 ML: 40 SOLUTION TOPICAL at 08:54

## 2019-01-14 ASSESSMENT — PAIN DESCRIPTION - ORIENTATION: ORIENTATION: RIGHT

## 2019-01-14 ASSESSMENT — PAIN DESCRIPTION - PAIN TYPE: TYPE: ACUTE PAIN

## 2019-01-14 ASSESSMENT — PAIN DESCRIPTION - LOCATION: LOCATION: LEG

## 2019-01-14 ASSESSMENT — PAIN DESCRIPTION - PROGRESSION: CLINICAL_PROGRESSION: NOT CHANGED

## 2019-01-14 ASSESSMENT — PAIN DESCRIPTION - DESCRIPTORS: DESCRIPTORS: BURNING

## 2019-01-14 ASSESSMENT — PAIN DESCRIPTION - ONSET: ONSET: GRADUAL

## 2019-01-14 ASSESSMENT — PAIN SCALES - GENERAL: PAINLEVEL_OUTOF10: 2

## 2019-01-14 ASSESSMENT — PAIN DESCRIPTION - FREQUENCY: FREQUENCY: CONTINUOUS

## 2019-01-15 PROBLEM — Z12.11 COLON CANCER SCREENING: Status: RESOLVED | Noted: 2018-12-16 | Resolved: 2019-01-15

## 2019-01-18 ENCOUNTER — ANTI-COAG VISIT (OUTPATIENT)
Dept: PHARMACY | Age: 54
End: 2019-01-18
Payer: MEDICAID

## 2019-01-18 DIAGNOSIS — I82.5Z9 CHRONIC VENOUS EMBOLISM AND THROMBOSIS OF DEEP VESSELS OF DISTAL LOWER EXTREMITY, UNSPECIFIED LATERALITY (HCC): ICD-10-CM

## 2019-01-18 LAB — INTERNATIONAL NORMALIZATION RATIO, POC: 1.8

## 2019-01-18 PROCEDURE — 99211 OFF/OP EST MAY X REQ PHY/QHP: CPT

## 2019-01-18 PROCEDURE — 85610 PROTHROMBIN TIME: CPT

## 2019-01-21 ENCOUNTER — HOSPITAL ENCOUNTER (OUTPATIENT)
Dept: WOUND CARE | Age: 54
Discharge: HOME | End: 2019-01-21
Payer: MEDICAID

## 2019-01-21 VITALS
RESPIRATION RATE: 18 BRPM | HEART RATE: 76 BPM | SYSTOLIC BLOOD PRESSURE: 134 MMHG | DIASTOLIC BLOOD PRESSURE: 83 MMHG | TEMPERATURE: 97.7 F

## 2019-01-21 DIAGNOSIS — I87.311 IDIOPATHIC CHRONIC VENOUS HYPERTENSION OF RIGHT LOWER EXTREMITY WITH ULCER (HCC): ICD-10-CM

## 2019-01-21 DIAGNOSIS — L97.919 IDIOPATHIC CHRONIC VENOUS HYPERTENSION OF RIGHT LOWER EXTREMITY WITH ULCER (HCC): ICD-10-CM

## 2019-01-21 DIAGNOSIS — R60.0 LOCALIZED EDEMA: Primary | ICD-10-CM

## 2019-01-21 DIAGNOSIS — I89.0 CHRONIC ACQUIRED LYMPHEDEMA: ICD-10-CM

## 2019-01-21 PROCEDURE — 11042 DBRDMT SUBQ TIS 1ST 20SQCM/<: CPT

## 2019-01-21 PROCEDURE — 6370000000 HC RX 637 (ALT 250 FOR IP): Performed by: SPECIALIST

## 2019-01-21 PROCEDURE — 11042 DBRDMT SUBQ TIS 1ST 20SQCM/<: CPT | Performed by: SPECIALIST

## 2019-01-21 RX ORDER — LIDOCAINE HYDROCHLORIDE 40 MG/ML
2.5 SOLUTION TOPICAL ONCE
Status: COMPLETED | OUTPATIENT
Start: 2019-01-21 | End: 2019-01-21

## 2019-01-21 RX ORDER — DOXYCYCLINE HYCLATE 100 MG
100 TABLET ORAL 2 TIMES DAILY
Qty: 62 TABLET | Refills: 0 | Status: SHIPPED | OUTPATIENT
Start: 2019-01-21 | End: 2019-02-21

## 2019-01-21 RX ADMIN — LIDOCAINE HYDROCHLORIDE 2.5 ML: 40 SOLUTION TOPICAL at 10:35

## 2019-01-21 ASSESSMENT — PAIN DESCRIPTION - ONSET: ONSET: ON-GOING

## 2019-01-21 ASSESSMENT — PAIN DESCRIPTION - ORIENTATION: ORIENTATION: RIGHT

## 2019-01-21 ASSESSMENT — PAIN SCALES - GENERAL: PAINLEVEL_OUTOF10: 2

## 2019-01-21 ASSESSMENT — PAIN DESCRIPTION - PAIN TYPE: TYPE: ACUTE PAIN

## 2019-01-21 ASSESSMENT — PAIN DESCRIPTION - DESCRIPTORS: DESCRIPTORS: BURNING

## 2019-01-21 ASSESSMENT — PAIN DESCRIPTION - FREQUENCY: FREQUENCY: CONTINUOUS

## 2019-01-21 ASSESSMENT — PAIN DESCRIPTION - LOCATION: LOCATION: LEG

## 2019-01-21 ASSESSMENT — PAIN DESCRIPTION - PROGRESSION: CLINICAL_PROGRESSION: NOT CHANGED

## 2019-01-25 ENCOUNTER — ANTI-COAG VISIT (OUTPATIENT)
Dept: PHARMACY | Age: 54
End: 2019-01-25
Payer: MEDICAID

## 2019-01-25 DIAGNOSIS — I82.5Z9 CHRONIC VENOUS EMBOLISM AND THROMBOSIS OF DEEP VESSELS OF DISTAL LOWER EXTREMITY, UNSPECIFIED LATERALITY (HCC): ICD-10-CM

## 2019-01-25 LAB — INTERNATIONAL NORMALIZATION RATIO, POC: 1.6

## 2019-01-25 PROCEDURE — 85610 PROTHROMBIN TIME: CPT

## 2019-01-25 PROCEDURE — 99211 OFF/OP EST MAY X REQ PHY/QHP: CPT

## 2019-01-28 ENCOUNTER — ANTI-COAG VISIT (OUTPATIENT)
Dept: PHARMACY | Age: 54
End: 2019-01-28
Payer: MEDICAID

## 2019-01-28 ENCOUNTER — HOSPITAL ENCOUNTER (OUTPATIENT)
Dept: WOUND CARE | Age: 54
Discharge: HOME | End: 2019-01-28
Payer: MEDICAID

## 2019-01-28 VITALS
HEART RATE: 77 BPM | SYSTOLIC BLOOD PRESSURE: 145 MMHG | RESPIRATION RATE: 18 BRPM | DIASTOLIC BLOOD PRESSURE: 76 MMHG | TEMPERATURE: 98.1 F

## 2019-01-28 DIAGNOSIS — I89.0 CHRONIC ACQUIRED LYMPHEDEMA: ICD-10-CM

## 2019-01-28 DIAGNOSIS — I82.220 THROMBOSIS OF INFERIOR VENA CAVA (HCC): ICD-10-CM

## 2019-01-28 DIAGNOSIS — Z79.01 CHRONIC ANTICOAGULATION: ICD-10-CM

## 2019-01-28 DIAGNOSIS — I82.4Z2 ACUTE VENOUS EMBOLISM AND THROMBOSIS OF DEEP VESSELS OF DISTAL END OF LEFT LOWER EXTREMITY (HCC): Primary | ICD-10-CM

## 2019-01-28 DIAGNOSIS — I82.5Z9 CHRONIC VENOUS EMBOLISM AND THROMBOSIS OF DEEP VESSELS OF DISTAL LOWER EXTREMITY, UNSPECIFIED LATERALITY (HCC): ICD-10-CM

## 2019-01-28 DIAGNOSIS — I87.311 IDIOPATHIC CHRONIC VENOUS HYPERTENSION OF RIGHT LOWER EXTREMITY WITH ULCER (HCC): ICD-10-CM

## 2019-01-28 DIAGNOSIS — L97.919 IDIOPATHIC CHRONIC VENOUS HYPERTENSION OF RIGHT LOWER EXTREMITY WITH ULCER (HCC): ICD-10-CM

## 2019-01-28 LAB — INTERNATIONAL NORMALIZATION RATIO, POC: 1.8

## 2019-01-28 PROCEDURE — 15271 SKIN SUB GRAFT TRNK/ARM/LEG: CPT

## 2019-01-28 PROCEDURE — 15271 SKIN SUB GRAFT TRNK/ARM/LEG: CPT | Performed by: SPECIALIST

## 2019-01-28 PROCEDURE — 99211 OFF/OP EST MAY X REQ PHY/QHP: CPT

## 2019-01-28 PROCEDURE — 11042 DBRDMT SUBQ TIS 1ST 20SQCM/<: CPT | Performed by: SPECIALIST

## 2019-01-28 PROCEDURE — 11042 DBRDMT SUBQ TIS 1ST 20SQCM/<: CPT

## 2019-01-28 PROCEDURE — 85610 PROTHROMBIN TIME: CPT

## 2019-01-28 PROCEDURE — 6370000000 HC RX 637 (ALT 250 FOR IP): Performed by: SPECIALIST

## 2019-01-28 PROCEDURE — 29581 APPL MULTLAYER CMPRN SYS LEG: CPT

## 2019-01-28 RX ORDER — LIDOCAINE HYDROCHLORIDE 40 MG/ML
2.5 SOLUTION TOPICAL ONCE
Status: COMPLETED | OUTPATIENT
Start: 2019-01-28 | End: 2019-01-28

## 2019-01-28 RX ADMIN — LIDOCAINE HYDROCHLORIDE 2.5 ML: 40 SOLUTION TOPICAL at 08:33

## 2019-01-28 ASSESSMENT — PAIN DESCRIPTION - ORIENTATION: ORIENTATION: RIGHT

## 2019-01-28 ASSESSMENT — PAIN DESCRIPTION - DESCRIPTORS: DESCRIPTORS: ACHING

## 2019-01-28 ASSESSMENT — PAIN SCALES - GENERAL: PAINLEVEL_OUTOF10: 2

## 2019-01-28 ASSESSMENT — PAIN DESCRIPTION - PAIN TYPE: TYPE: ACUTE PAIN

## 2019-01-30 ENCOUNTER — ANTI-COAG VISIT (OUTPATIENT)
Dept: PHARMACY | Age: 54
End: 2019-01-30
Payer: MEDICAID

## 2019-01-30 ENCOUNTER — HOSPITAL ENCOUNTER (OUTPATIENT)
Dept: WOUND CARE | Age: 54
Discharge: HOME | End: 2019-01-30
Payer: MEDICAID

## 2019-01-30 VITALS
TEMPERATURE: 98.2 F | DIASTOLIC BLOOD PRESSURE: 87 MMHG | RESPIRATION RATE: 18 BRPM | SYSTOLIC BLOOD PRESSURE: 138 MMHG | HEART RATE: 72 BPM

## 2019-01-30 DIAGNOSIS — I82.5Z9 CHRONIC VENOUS EMBOLISM AND THROMBOSIS OF DEEP VESSELS OF DISTAL LOWER EXTREMITY, UNSPECIFIED LATERALITY (HCC): ICD-10-CM

## 2019-01-30 LAB — INTERNATIONAL NORMALIZATION RATIO, POC: 1.8

## 2019-01-30 PROCEDURE — 99211 OFF/OP EST MAY X REQ PHY/QHP: CPT

## 2019-01-30 PROCEDURE — 29581 APPL MULTLAYER CMPRN SYS LEG: CPT

## 2019-01-30 PROCEDURE — 85610 PROTHROMBIN TIME: CPT

## 2019-01-30 ASSESSMENT — PAIN DESCRIPTION - LOCATION: LOCATION: LEG

## 2019-01-30 ASSESSMENT — PAIN DESCRIPTION - PAIN TYPE: TYPE: CHRONIC PAIN

## 2019-01-30 ASSESSMENT — PAIN DESCRIPTION - DESCRIPTORS: DESCRIPTORS: BURNING

## 2019-01-30 ASSESSMENT — PAIN DESCRIPTION - ORIENTATION: ORIENTATION: RIGHT

## 2019-01-31 ENCOUNTER — TELEPHONE (OUTPATIENT)
Dept: WOUND CARE | Age: 54
End: 2019-01-31

## 2019-02-01 ENCOUNTER — ANTI-COAG VISIT (OUTPATIENT)
Dept: PHARMACY | Age: 54
End: 2019-02-01
Payer: MEDICAID

## 2019-02-01 DIAGNOSIS — I82.5Z9 CHRONIC VENOUS EMBOLISM AND THROMBOSIS OF DEEP VESSELS OF DISTAL LOWER EXTREMITY, UNSPECIFIED LATERALITY (HCC): ICD-10-CM

## 2019-02-01 LAB — INTERNATIONAL NORMALIZATION RATIO, POC: 1.9

## 2019-02-01 PROCEDURE — 99211 OFF/OP EST MAY X REQ PHY/QHP: CPT

## 2019-02-01 PROCEDURE — 85610 PROTHROMBIN TIME: CPT

## 2019-02-04 ENCOUNTER — HOSPITAL ENCOUNTER (OUTPATIENT)
Dept: WOUND CARE | Age: 54
Discharge: HOME | End: 2019-02-04
Payer: MEDICAID

## 2019-02-04 ENCOUNTER — ANTI-COAG VISIT (OUTPATIENT)
Dept: PHARMACY | Age: 54
End: 2019-02-04
Payer: MEDICAID

## 2019-02-04 VITALS
SYSTOLIC BLOOD PRESSURE: 155 MMHG | RESPIRATION RATE: 18 BRPM | HEART RATE: 69 BPM | DIASTOLIC BLOOD PRESSURE: 82 MMHG | TEMPERATURE: 97.9 F

## 2019-02-04 DIAGNOSIS — I89.0 CHRONIC ACQUIRED LYMPHEDEMA: ICD-10-CM

## 2019-02-04 DIAGNOSIS — L97.919 IDIOPATHIC CHRONIC VENOUS HYPERTENSION OF RIGHT LOWER EXTREMITY WITH ULCER (HCC): ICD-10-CM

## 2019-02-04 DIAGNOSIS — R60.0 LOCALIZED EDEMA: Primary | ICD-10-CM

## 2019-02-04 DIAGNOSIS — I87.311 IDIOPATHIC CHRONIC VENOUS HYPERTENSION OF RIGHT LOWER EXTREMITY WITH ULCER (HCC): ICD-10-CM

## 2019-02-04 DIAGNOSIS — I82.5Z9 CHRONIC VENOUS EMBOLISM AND THROMBOSIS OF DEEP VESSELS OF DISTAL LOWER EXTREMITY, UNSPECIFIED LATERALITY (HCC): ICD-10-CM

## 2019-02-04 DIAGNOSIS — Z79.01 CHRONIC ANTICOAGULATION: ICD-10-CM

## 2019-02-04 DIAGNOSIS — I82.220 THROMBOSIS OF INFERIOR VENA CAVA (HCC): ICD-10-CM

## 2019-02-04 LAB — INTERNATIONAL NORMALIZATION RATIO, POC: 2.2

## 2019-02-04 PROCEDURE — 99211 OFF/OP EST MAY X REQ PHY/QHP: CPT

## 2019-02-04 PROCEDURE — 29581 APPL MULTLAYER CMPRN SYS LEG: CPT

## 2019-02-04 PROCEDURE — 85610 PROTHROMBIN TIME: CPT

## 2019-02-04 PROCEDURE — 29581 APPL MULTLAYER CMPRN SYS LEG: CPT | Performed by: SPECIALIST

## 2019-02-04 PROCEDURE — 6370000000 HC RX 637 (ALT 250 FOR IP): Performed by: SPECIALIST

## 2019-02-04 RX ORDER — LIDOCAINE HYDROCHLORIDE 40 MG/ML
2.5 SOLUTION TOPICAL ONCE
Status: COMPLETED | OUTPATIENT
Start: 2019-02-04 | End: 2019-02-04

## 2019-02-04 RX ADMIN — LIDOCAINE HYDROCHLORIDE 2.5 ML: 40 SOLUTION TOPICAL at 09:10

## 2019-02-04 ASSESSMENT — PAIN DESCRIPTION - ORIENTATION: ORIENTATION: RIGHT

## 2019-02-04 ASSESSMENT — PAIN DESCRIPTION - LOCATION: LOCATION: LEG

## 2019-02-04 ASSESSMENT — PAIN DESCRIPTION - ONSET: ONSET: GRADUAL

## 2019-02-04 ASSESSMENT — PAIN DESCRIPTION - FREQUENCY: FREQUENCY: INTERMITTENT

## 2019-02-04 ASSESSMENT — PAIN DESCRIPTION - DESCRIPTORS: DESCRIPTORS: ACHING

## 2019-02-04 ASSESSMENT — PAIN DESCRIPTION - PAIN TYPE: TYPE: CHRONIC PAIN

## 2019-02-04 ASSESSMENT — PAIN SCALES - GENERAL: PAINLEVEL_OUTOF10: 2

## 2019-02-04 ASSESSMENT — PAIN DESCRIPTION - PROGRESSION: CLINICAL_PROGRESSION: NOT CHANGED

## 2019-02-08 ENCOUNTER — TELEPHONE (OUTPATIENT)
Dept: FAMILY MEDICINE CLINIC | Age: 54
End: 2019-02-08

## 2019-02-08 ENCOUNTER — ANTI-COAG VISIT (OUTPATIENT)
Dept: PHARMACY | Age: 54
End: 2019-02-08
Payer: MEDICAID

## 2019-02-08 DIAGNOSIS — R68.89 FLU-LIKE SYMPTOMS: Primary | ICD-10-CM

## 2019-02-08 DIAGNOSIS — Z20.828 EXPOSURE TO THE FLU: ICD-10-CM

## 2019-02-08 DIAGNOSIS — I82.5Z9 CHRONIC VENOUS EMBOLISM AND THROMBOSIS OF DEEP VESSELS OF DISTAL LOWER EXTREMITY, UNSPECIFIED LATERALITY (HCC): ICD-10-CM

## 2019-02-08 LAB — INTERNATIONAL NORMALIZATION RATIO, POC: 1.8

## 2019-02-08 PROCEDURE — 99211 OFF/OP EST MAY X REQ PHY/QHP: CPT | Performed by: PHARMACIST

## 2019-02-08 PROCEDURE — 85610 PROTHROMBIN TIME: CPT | Performed by: PHARMACIST

## 2019-02-08 RX ORDER — OSELTAMIVIR PHOSPHATE 75 MG/1
75 CAPSULE ORAL 2 TIMES DAILY
Qty: 10 CAPSULE | Refills: 0 | Status: SHIPPED | OUTPATIENT
Start: 2019-02-08 | End: 2019-02-13

## 2019-02-11 ENCOUNTER — HOSPITAL ENCOUNTER (OUTPATIENT)
Dept: WOUND CARE | Age: 54
Discharge: HOME | End: 2019-02-11
Payer: MEDICAID

## 2019-02-11 VITALS
HEART RATE: 85 BPM | SYSTOLIC BLOOD PRESSURE: 134 MMHG | RESPIRATION RATE: 18 BRPM | DIASTOLIC BLOOD PRESSURE: 80 MMHG | TEMPERATURE: 98.4 F

## 2019-02-11 DIAGNOSIS — A49.8 PSEUDOMONAS INFECTION: Primary | ICD-10-CM

## 2019-02-11 DIAGNOSIS — I89.0 CHRONIC ACQUIRED LYMPHEDEMA: ICD-10-CM

## 2019-02-11 DIAGNOSIS — R60.0 LOCALIZED EDEMA: ICD-10-CM

## 2019-02-11 DIAGNOSIS — Z79.01 CHRONIC ANTICOAGULATION: ICD-10-CM

## 2019-02-11 DIAGNOSIS — I87.311 IDIOPATHIC CHRONIC VENOUS HYPERTENSION OF RIGHT LOWER EXTREMITY WITH ULCER (HCC): ICD-10-CM

## 2019-02-11 DIAGNOSIS — I82.220 THROMBOSIS OF INFERIOR VENA CAVA (HCC): ICD-10-CM

## 2019-02-11 DIAGNOSIS — L97.919 IDIOPATHIC CHRONIC VENOUS HYPERTENSION OF RIGHT LOWER EXTREMITY WITH ULCER (HCC): ICD-10-CM

## 2019-02-11 PROCEDURE — 11042 DBRDMT SUBQ TIS 1ST 20SQCM/<: CPT | Performed by: SPECIALIST

## 2019-02-11 PROCEDURE — 11045 DBRDMT SUBQ TISS EACH ADDL: CPT

## 2019-02-11 PROCEDURE — 6370000000 HC RX 637 (ALT 250 FOR IP): Performed by: SPECIALIST

## 2019-02-11 PROCEDURE — 11042 DBRDMT SUBQ TIS 1ST 20SQCM/<: CPT

## 2019-02-11 PROCEDURE — 29580 STRAPPING UNNA BOOT: CPT

## 2019-02-11 PROCEDURE — 11045 DBRDMT SUBQ TISS EACH ADDL: CPT | Performed by: SPECIALIST

## 2019-02-11 RX ORDER — LIDOCAINE HYDROCHLORIDE 40 MG/ML
2.5 SOLUTION TOPICAL ONCE
Status: COMPLETED | OUTPATIENT
Start: 2019-02-11 | End: 2019-02-11

## 2019-02-11 RX ORDER — CIPROFLOXACIN 500 MG/1
500 TABLET, FILM COATED ORAL 2 TIMES DAILY
Qty: 20 TABLET | Refills: 0 | Status: SHIPPED | OUTPATIENT
Start: 2019-02-11 | End: 2019-02-21

## 2019-02-11 RX ADMIN — LIDOCAINE HYDROCHLORIDE 2.5 ML: 40 SOLUTION TOPICAL at 08:47

## 2019-02-11 ASSESSMENT — PAIN DESCRIPTION - FREQUENCY: FREQUENCY: INTERMITTENT

## 2019-02-11 ASSESSMENT — PAIN DESCRIPTION - LOCATION: LOCATION: LEG

## 2019-02-11 ASSESSMENT — PAIN DESCRIPTION - ORIENTATION: ORIENTATION: RIGHT

## 2019-02-11 ASSESSMENT — PAIN SCALES - GENERAL: PAINLEVEL_OUTOF10: 4

## 2019-02-11 ASSESSMENT — PAIN DESCRIPTION - PAIN TYPE: TYPE: CHRONIC PAIN

## 2019-02-11 ASSESSMENT — PAIN DESCRIPTION - DESCRIPTORS: DESCRIPTORS: ACHING

## 2019-02-13 ENCOUNTER — ANTI-COAG VISIT (OUTPATIENT)
Dept: PHARMACY | Age: 54
End: 2019-02-13
Payer: MEDICAID

## 2019-02-13 DIAGNOSIS — I82.5Z9 CHRONIC VENOUS EMBOLISM AND THROMBOSIS OF DEEP VESSELS OF DISTAL LOWER EXTREMITY, UNSPECIFIED LATERALITY (HCC): ICD-10-CM

## 2019-02-13 LAB — INTERNATIONAL NORMALIZATION RATIO, POC: 2.1

## 2019-02-13 PROCEDURE — 85610 PROTHROMBIN TIME: CPT

## 2019-02-13 PROCEDURE — 99211 OFF/OP EST MAY X REQ PHY/QHP: CPT

## 2019-02-15 ENCOUNTER — HOSPITAL ENCOUNTER (OUTPATIENT)
Dept: WOUND CARE | Age: 54
Discharge: HOME | End: 2019-02-15
Payer: MEDICAID

## 2019-02-15 VITALS
TEMPERATURE: 97.6 F | DIASTOLIC BLOOD PRESSURE: 86 MMHG | SYSTOLIC BLOOD PRESSURE: 137 MMHG | HEART RATE: 72 BPM | RESPIRATION RATE: 16 BRPM

## 2019-02-15 PROCEDURE — 29581 APPL MULTLAYER CMPRN SYS LEG: CPT

## 2019-02-15 ASSESSMENT — PAIN DESCRIPTION - LOCATION: LOCATION: LEG

## 2019-02-15 ASSESSMENT — PAIN DESCRIPTION - ORIENTATION: ORIENTATION: RIGHT

## 2019-02-15 ASSESSMENT — PAIN DESCRIPTION - PAIN TYPE: TYPE: CHRONIC PAIN

## 2019-02-15 ASSESSMENT — PAIN DESCRIPTION - ONSET: ONSET: GRADUAL

## 2019-02-15 ASSESSMENT — PAIN DESCRIPTION - PROGRESSION: CLINICAL_PROGRESSION: NOT CHANGED

## 2019-02-15 ASSESSMENT — PAIN DESCRIPTION - DESCRIPTORS: DESCRIPTORS: ACHING

## 2019-02-15 ASSESSMENT — PAIN DESCRIPTION - FREQUENCY: FREQUENCY: INTERMITTENT

## 2019-02-15 ASSESSMENT — PAIN SCALES - GENERAL: PAINLEVEL_OUTOF10: 3

## 2019-02-18 ENCOUNTER — HOSPITAL ENCOUNTER (OUTPATIENT)
Dept: WOUND CARE | Age: 54
Discharge: HOME | End: 2019-02-18
Payer: MEDICAID

## 2019-02-18 VITALS
TEMPERATURE: 97 F | HEART RATE: 78 BPM | SYSTOLIC BLOOD PRESSURE: 139 MMHG | DIASTOLIC BLOOD PRESSURE: 74 MMHG | RESPIRATION RATE: 16 BRPM

## 2019-02-18 DIAGNOSIS — I82.220 THROMBOSIS OF INFERIOR VENA CAVA (HCC): Primary | ICD-10-CM

## 2019-02-18 DIAGNOSIS — L97.919 IDIOPATHIC CHRONIC VENOUS HYPERTENSION OF RIGHT LOWER EXTREMITY WITH ULCER (HCC): ICD-10-CM

## 2019-02-18 DIAGNOSIS — I89.0 CHRONIC ACQUIRED LYMPHEDEMA: ICD-10-CM

## 2019-02-18 DIAGNOSIS — I87.311 IDIOPATHIC CHRONIC VENOUS HYPERTENSION OF RIGHT LOWER EXTREMITY WITH ULCER (HCC): ICD-10-CM

## 2019-02-18 PROCEDURE — 11042 DBRDMT SUBQ TIS 1ST 20SQCM/<: CPT | Performed by: SPECIALIST

## 2019-02-18 PROCEDURE — 15271 SKIN SUB GRAFT TRNK/ARM/LEG: CPT

## 2019-02-18 PROCEDURE — 29581 APPL MULTLAYER CMPRN SYS LEG: CPT

## 2019-02-18 PROCEDURE — 11042 DBRDMT SUBQ TIS 1ST 20SQCM/<: CPT

## 2019-02-18 PROCEDURE — 6370000000 HC RX 637 (ALT 250 FOR IP): Performed by: SPECIALIST

## 2019-02-18 PROCEDURE — 15271 SKIN SUB GRAFT TRNK/ARM/LEG: CPT | Performed by: SPECIALIST

## 2019-02-18 RX ORDER — LIDOCAINE HYDROCHLORIDE 40 MG/ML
2.5 SOLUTION TOPICAL ONCE
Status: COMPLETED | OUTPATIENT
Start: 2019-02-18 | End: 2019-02-18

## 2019-02-18 RX ADMIN — LIDOCAINE HYDROCHLORIDE 2.5 ML: 40 SOLUTION TOPICAL at 11:24

## 2019-02-18 ASSESSMENT — PAIN DESCRIPTION - LOCATION: LOCATION: LEG

## 2019-02-18 ASSESSMENT — PAIN DESCRIPTION - PROGRESSION: CLINICAL_PROGRESSION: NOT CHANGED

## 2019-02-18 ASSESSMENT — PAIN DESCRIPTION - FREQUENCY: FREQUENCY: INTERMITTENT

## 2019-02-18 ASSESSMENT — PAIN DESCRIPTION - ORIENTATION: ORIENTATION: RIGHT

## 2019-02-18 ASSESSMENT — PAIN DESCRIPTION - PAIN TYPE: TYPE: CHRONIC PAIN

## 2019-02-18 ASSESSMENT — PAIN DESCRIPTION - ONSET: ONSET: GRADUAL

## 2019-02-18 ASSESSMENT — PAIN DESCRIPTION - DESCRIPTORS: DESCRIPTORS: ACHING

## 2019-02-18 ASSESSMENT — PAIN SCALES - GENERAL: PAINLEVEL_OUTOF10: 3

## 2019-02-20 ENCOUNTER — ANTI-COAG VISIT (OUTPATIENT)
Dept: PHARMACY | Age: 54
End: 2019-02-20
Payer: MEDICAID

## 2019-02-20 DIAGNOSIS — I82.5Z9 CHRONIC VENOUS EMBOLISM AND THROMBOSIS OF DEEP VESSELS OF DISTAL LOWER EXTREMITY, UNSPECIFIED LATERALITY (HCC): ICD-10-CM

## 2019-02-20 LAB — INTERNATIONAL NORMALIZATION RATIO, POC: 2.9

## 2019-02-20 PROCEDURE — 85610 PROTHROMBIN TIME: CPT

## 2019-02-20 PROCEDURE — 99211 OFF/OP EST MAY X REQ PHY/QHP: CPT

## 2019-02-22 ENCOUNTER — HOSPITAL ENCOUNTER (OUTPATIENT)
Dept: WOUND CARE | Age: 54
Discharge: HOME | End: 2019-02-22
Payer: MEDICAID

## 2019-02-22 VITALS
SYSTOLIC BLOOD PRESSURE: 129 MMHG | RESPIRATION RATE: 18 BRPM | TEMPERATURE: 97.6 F | DIASTOLIC BLOOD PRESSURE: 76 MMHG | HEART RATE: 72 BPM

## 2019-02-22 PROCEDURE — 29581 APPL MULTLAYER CMPRN SYS LEG: CPT

## 2019-02-22 RX ORDER — DOXYCYCLINE HYCLATE 100 MG/1
100 CAPSULE ORAL 2 TIMES DAILY
COMMUNITY
End: 2019-02-25 | Stop reason: SDUPTHER

## 2019-02-22 RX ORDER — DOXYCYCLINE HYCLATE 100 MG
100 TABLET ORAL 2 TIMES DAILY
Qty: 56 TABLET | Refills: 0 | Status: SHIPPED | OUTPATIENT
Start: 2019-02-22 | End: 2019-03-22

## 2019-02-22 ASSESSMENT — PAIN SCALES - GENERAL: PAINLEVEL_OUTOF10: 2

## 2019-02-22 ASSESSMENT — PAIN DESCRIPTION - LOCATION: LOCATION: LEG

## 2019-02-22 ASSESSMENT — PAIN DESCRIPTION - ORIENTATION: ORIENTATION: RIGHT

## 2019-02-22 ASSESSMENT — PAIN DESCRIPTION - PAIN TYPE: TYPE: ACUTE PAIN

## 2019-02-22 ASSESSMENT — PAIN DESCRIPTION - DESCRIPTORS: DESCRIPTORS: ACHING

## 2019-02-25 ENCOUNTER — HOSPITAL ENCOUNTER (OUTPATIENT)
Dept: WOUND CARE | Age: 54
Discharge: HOME | End: 2019-02-25
Payer: MEDICAID

## 2019-02-25 VITALS
DIASTOLIC BLOOD PRESSURE: 71 MMHG | HEART RATE: 67 BPM | RESPIRATION RATE: 18 BRPM | SYSTOLIC BLOOD PRESSURE: 140 MMHG | TEMPERATURE: 98.1 F

## 2019-02-25 DIAGNOSIS — I82.220 THROMBOSIS OF INFERIOR VENA CAVA (HCC): ICD-10-CM

## 2019-02-25 DIAGNOSIS — R60.0 LOCALIZED EDEMA: ICD-10-CM

## 2019-02-25 DIAGNOSIS — I89.0 CHRONIC ACQUIRED LYMPHEDEMA: Primary | ICD-10-CM

## 2019-02-25 DIAGNOSIS — Z79.01 CHRONIC ANTICOAGULATION: ICD-10-CM

## 2019-02-25 DIAGNOSIS — L97.919 IDIOPATHIC CHRONIC VENOUS HYPERTENSION OF RIGHT LOWER EXTREMITY WITH ULCER (HCC): ICD-10-CM

## 2019-02-25 DIAGNOSIS — I87.311 IDIOPATHIC CHRONIC VENOUS HYPERTENSION OF RIGHT LOWER EXTREMITY WITH ULCER (HCC): ICD-10-CM

## 2019-02-25 PROCEDURE — 11042 DBRDMT SUBQ TIS 1ST 20SQCM/<: CPT

## 2019-02-25 PROCEDURE — 29581 APPL MULTLAYER CMPRN SYS LEG: CPT

## 2019-02-25 PROCEDURE — 11042 DBRDMT SUBQ TIS 1ST 20SQCM/<: CPT | Performed by: SPECIALIST

## 2019-02-25 PROCEDURE — 6370000000 HC RX 637 (ALT 250 FOR IP): Performed by: SPECIALIST

## 2019-02-25 RX ORDER — LIDOCAINE HYDROCHLORIDE 40 MG/ML
2.5 SOLUTION TOPICAL ONCE
Status: COMPLETED | OUTPATIENT
Start: 2019-02-25 | End: 2019-02-25

## 2019-02-25 RX ADMIN — LIDOCAINE HYDROCHLORIDE 2.5 ML: 40 SOLUTION TOPICAL at 09:29

## 2019-02-25 ASSESSMENT — PAIN DESCRIPTION - FREQUENCY: FREQUENCY: INTERMITTENT

## 2019-02-25 ASSESSMENT — PAIN DESCRIPTION - LOCATION: LOCATION: LEG

## 2019-02-25 ASSESSMENT — PAIN DESCRIPTION - ORIENTATION: ORIENTATION: RIGHT

## 2019-02-25 ASSESSMENT — PAIN DESCRIPTION - PAIN TYPE: TYPE: CHRONIC PAIN

## 2019-02-25 ASSESSMENT — PAIN DESCRIPTION - PROGRESSION: CLINICAL_PROGRESSION: NOT CHANGED

## 2019-02-25 ASSESSMENT — PAIN DESCRIPTION - DESCRIPTORS: DESCRIPTORS: ACHING

## 2019-02-25 ASSESSMENT — PAIN DESCRIPTION - ONSET: ONSET: ON-GOING

## 2019-02-25 ASSESSMENT — PAIN SCALES - GENERAL: PAINLEVEL_OUTOF10: 2

## 2019-02-27 ENCOUNTER — ANTI-COAG VISIT (OUTPATIENT)
Dept: PHARMACY | Age: 54
End: 2019-02-27
Payer: MEDICAID

## 2019-02-27 DIAGNOSIS — I82.5Z1 CHRONIC VENOUS EMBOLISM AND THROMBOSIS OF DEEP VESSELS OF DISTAL END OF RIGHT LOWER EXTREMITY (HCC): ICD-10-CM

## 2019-02-27 DIAGNOSIS — I82.423 THROMBOSIS OF BOTH ILIAC VEINS (HCC): ICD-10-CM

## 2019-02-27 DIAGNOSIS — I82.220 THROMBOSIS OF INFERIOR VENA CAVA (HCC): ICD-10-CM

## 2019-02-27 DIAGNOSIS — Z79.01 CHRONIC ANTICOAGULATION: ICD-10-CM

## 2019-02-27 DIAGNOSIS — I82.5Z9 CHRONIC VENOUS EMBOLISM AND THROMBOSIS OF DEEP VESSELS OF DISTAL LOWER EXTREMITY, UNSPECIFIED LATERALITY (HCC): ICD-10-CM

## 2019-02-27 LAB — INTERNATIONAL NORMALIZATION RATIO, POC: 2.7

## 2019-02-27 PROCEDURE — 85610 PROTHROMBIN TIME: CPT

## 2019-02-27 PROCEDURE — 99211 OFF/OP EST MAY X REQ PHY/QHP: CPT

## 2019-02-27 RX ORDER — WARFARIN SODIUM 5 MG/1
TABLET ORAL
Qty: 145 TABLET | Refills: 1 | Status: SHIPPED | OUTPATIENT
Start: 2019-02-27 | End: 2019-04-03 | Stop reason: SDUPTHER

## 2019-03-01 ENCOUNTER — TELEPHONE (OUTPATIENT)
Dept: PHARMACY | Age: 54
End: 2019-03-01

## 2019-03-01 ENCOUNTER — HOSPITAL ENCOUNTER (OUTPATIENT)
Dept: WOUND CARE | Age: 54
Discharge: HOME | End: 2019-03-01
Payer: MEDICAID

## 2019-03-01 VITALS
DIASTOLIC BLOOD PRESSURE: 81 MMHG | TEMPERATURE: 97.6 F | RESPIRATION RATE: 18 BRPM | SYSTOLIC BLOOD PRESSURE: 146 MMHG | HEART RATE: 80 BPM

## 2019-03-01 PROCEDURE — 29581 APPL MULTLAYER CMPRN SYS LEG: CPT

## 2019-03-01 RX ORDER — CIPROFLOXACIN 500 MG/1
500 TABLET, FILM COATED ORAL 2 TIMES DAILY
COMMUNITY
End: 2019-03-04

## 2019-03-01 RX ORDER — CIPROFLOXACIN 500 MG/1
500 TABLET, FILM COATED ORAL 2 TIMES DAILY
Qty: 20 TABLET | Refills: 0 | Status: SHIPPED | OUTPATIENT
Start: 2019-03-01 | End: 2019-03-11

## 2019-03-01 ASSESSMENT — PAIN DESCRIPTION - PAIN TYPE: TYPE: CHRONIC PAIN

## 2019-03-01 ASSESSMENT — PAIN DESCRIPTION - DESCRIPTORS: DESCRIPTORS: ACHING

## 2019-03-01 ASSESSMENT — PAIN SCALES - GENERAL: PAINLEVEL_OUTOF10: 2

## 2019-03-01 ASSESSMENT — PAIN DESCRIPTION - LOCATION: LOCATION: LEG

## 2019-03-01 ASSESSMENT — PAIN DESCRIPTION - ONSET: ONSET: ON-GOING

## 2019-03-01 ASSESSMENT — PAIN DESCRIPTION - FREQUENCY: FREQUENCY: INTERMITTENT

## 2019-03-01 ASSESSMENT — PAIN DESCRIPTION - ORIENTATION: ORIENTATION: RIGHT

## 2019-03-01 ASSESSMENT — PAIN DESCRIPTION - PROGRESSION: CLINICAL_PROGRESSION: NOT CHANGED

## 2019-03-04 ENCOUNTER — HOSPITAL ENCOUNTER (OUTPATIENT)
Dept: WOUND CARE | Age: 54
Discharge: HOME | End: 2019-03-04
Payer: MEDICAID

## 2019-03-04 DIAGNOSIS — I87.311 IDIOPATHIC CHRONIC VENOUS HYPERTENSION OF RIGHT LOWER EXTREMITY WITH ULCER (HCC): ICD-10-CM

## 2019-03-04 DIAGNOSIS — L97.919 IDIOPATHIC CHRONIC VENOUS HYPERTENSION OF RIGHT LOWER EXTREMITY WITH ULCER (HCC): ICD-10-CM

## 2019-03-04 DIAGNOSIS — I82.5Z9 CHRONIC VENOUS EMBOLISM AND THROMBOSIS OF DEEP VESSELS OF DISTAL LOWER EXTREMITY, UNSPECIFIED LATERALITY (HCC): ICD-10-CM

## 2019-03-04 DIAGNOSIS — I82.220 THROMBOSIS OF INFERIOR VENA CAVA (HCC): ICD-10-CM

## 2019-03-04 DIAGNOSIS — I89.0 CHRONIC ACQUIRED LYMPHEDEMA: Primary | ICD-10-CM

## 2019-03-04 PROCEDURE — 6370000000 HC RX 637 (ALT 250 FOR IP): Performed by: SPECIALIST

## 2019-03-04 PROCEDURE — 15271 SKIN SUB GRAFT TRNK/ARM/LEG: CPT

## 2019-03-04 PROCEDURE — 11042 DBRDMT SUBQ TIS 1ST 20SQCM/<: CPT

## 2019-03-04 PROCEDURE — 15271 SKIN SUB GRAFT TRNK/ARM/LEG: CPT | Performed by: SPECIALIST

## 2019-03-04 PROCEDURE — 29581 APPL MULTLAYER CMPRN SYS LEG: CPT

## 2019-03-04 RX ORDER — LIDOCAINE HYDROCHLORIDE 40 MG/ML
2.5 SOLUTION TOPICAL ONCE
Status: COMPLETED | OUTPATIENT
Start: 2019-03-04 | End: 2019-03-04

## 2019-03-04 RX ADMIN — LIDOCAINE HYDROCHLORIDE 2.5 ML: 40 SOLUTION TOPICAL at 09:13

## 2019-03-08 ENCOUNTER — HOSPITAL ENCOUNTER (OUTPATIENT)
Dept: WOUND CARE | Age: 54
Discharge: HOME | End: 2019-03-08
Payer: MEDICAID

## 2019-03-08 VITALS
DIASTOLIC BLOOD PRESSURE: 87 MMHG | TEMPERATURE: 97.8 F | RESPIRATION RATE: 18 BRPM | SYSTOLIC BLOOD PRESSURE: 147 MMHG | HEART RATE: 75 BPM

## 2019-03-08 PROCEDURE — 29581 APPL MULTLAYER CMPRN SYS LEG: CPT

## 2019-03-11 ENCOUNTER — HOSPITAL ENCOUNTER (OUTPATIENT)
Dept: WOUND CARE | Age: 54
Discharge: HOME | End: 2019-03-11
Payer: MEDICAID

## 2019-03-11 VITALS
HEART RATE: 70 BPM | TEMPERATURE: 98.1 F | RESPIRATION RATE: 18 BRPM | SYSTOLIC BLOOD PRESSURE: 139 MMHG | DIASTOLIC BLOOD PRESSURE: 79 MMHG

## 2019-03-11 DIAGNOSIS — R60.0 LOCALIZED EDEMA: Primary | ICD-10-CM

## 2019-03-11 DIAGNOSIS — L97.919 IDIOPATHIC CHRONIC VENOUS HYPERTENSION OF RIGHT LOWER EXTREMITY WITH ULCER (HCC): ICD-10-CM

## 2019-03-11 DIAGNOSIS — I89.0 CHRONIC ACQUIRED LYMPHEDEMA: ICD-10-CM

## 2019-03-11 DIAGNOSIS — I82.220 THROMBOSIS OF INFERIOR VENA CAVA (HCC): ICD-10-CM

## 2019-03-11 DIAGNOSIS — Z79.01 CHRONIC ANTICOAGULATION: ICD-10-CM

## 2019-03-11 DIAGNOSIS — I87.311 IDIOPATHIC CHRONIC VENOUS HYPERTENSION OF RIGHT LOWER EXTREMITY WITH ULCER (HCC): ICD-10-CM

## 2019-03-11 PROCEDURE — 29581 APPL MULTLAYER CMPRN SYS LEG: CPT

## 2019-03-11 PROCEDURE — 29581 APPL MULTLAYER CMPRN SYS LEG: CPT | Performed by: SPECIALIST

## 2019-03-11 RX ORDER — CIPROFLOXACIN 500 MG/1
500 TABLET, FILM COATED ORAL 2 TIMES DAILY
Qty: 20 TABLET | Refills: 0 | Status: SHIPPED | OUTPATIENT
Start: 2019-03-11 | End: 2019-03-21

## 2019-03-11 RX ORDER — LIDOCAINE HYDROCHLORIDE 40 MG/ML
2.5 SOLUTION TOPICAL ONCE
Status: DISCONTINUED | OUTPATIENT
Start: 2019-03-11 | End: 2019-03-11

## 2019-03-13 ENCOUNTER — ANTI-COAG VISIT (OUTPATIENT)
Dept: PHARMACY | Age: 54
End: 2019-03-13
Payer: MEDICAID

## 2019-03-13 DIAGNOSIS — I82.5Z9 CHRONIC VENOUS EMBOLISM AND THROMBOSIS OF DEEP VESSELS OF DISTAL LOWER EXTREMITY, UNSPECIFIED LATERALITY (HCC): ICD-10-CM

## 2019-03-13 LAB — INTERNATIONAL NORMALIZATION RATIO, POC: 2.6

## 2019-03-13 PROCEDURE — 99211 OFF/OP EST MAY X REQ PHY/QHP: CPT

## 2019-03-13 PROCEDURE — 85610 PROTHROMBIN TIME: CPT

## 2019-03-15 ENCOUNTER — HOSPITAL ENCOUNTER (OUTPATIENT)
Dept: WOUND CARE | Age: 54
Discharge: HOME | End: 2019-03-15
Payer: MEDICAID

## 2019-03-15 VITALS
RESPIRATION RATE: 16 BRPM | TEMPERATURE: 98 F | DIASTOLIC BLOOD PRESSURE: 78 MMHG | SYSTOLIC BLOOD PRESSURE: 129 MMHG | HEART RATE: 74 BPM

## 2019-03-15 PROCEDURE — 29581 APPL MULTLAYER CMPRN SYS LEG: CPT

## 2019-03-15 ASSESSMENT — PAIN DESCRIPTION - ORIENTATION: ORIENTATION: RIGHT

## 2019-03-15 ASSESSMENT — PAIN DESCRIPTION - ONSET: ONSET: ON-GOING

## 2019-03-15 ASSESSMENT — PAIN DESCRIPTION - DESCRIPTORS: DESCRIPTORS: ACHING

## 2019-03-15 ASSESSMENT — PAIN DESCRIPTION - PAIN TYPE: TYPE: CHRONIC PAIN

## 2019-03-15 ASSESSMENT — PAIN DESCRIPTION - LOCATION: LOCATION: LEG

## 2019-03-15 ASSESSMENT — PAIN DESCRIPTION - PROGRESSION: CLINICAL_PROGRESSION: NOT CHANGED

## 2019-03-15 ASSESSMENT — PAIN DESCRIPTION - FREQUENCY: FREQUENCY: INTERMITTENT

## 2019-03-18 ENCOUNTER — HOSPITAL ENCOUNTER (OUTPATIENT)
Dept: WOUND CARE | Age: 54
Discharge: HOME | End: 2019-03-18
Payer: MEDICAID

## 2019-03-18 VITALS
HEART RATE: 76 BPM | TEMPERATURE: 98 F | DIASTOLIC BLOOD PRESSURE: 83 MMHG | RESPIRATION RATE: 16 BRPM | SYSTOLIC BLOOD PRESSURE: 128 MMHG

## 2019-03-18 DIAGNOSIS — R60.0 LOCALIZED EDEMA: Primary | ICD-10-CM

## 2019-03-18 DIAGNOSIS — Z79.01 CHRONIC ANTICOAGULATION: ICD-10-CM

## 2019-03-18 PROCEDURE — 11045 DBRDMT SUBQ TISS EACH ADDL: CPT

## 2019-03-18 PROCEDURE — 15271 SKIN SUB GRAFT TRNK/ARM/LEG: CPT

## 2019-03-18 PROCEDURE — 11042 DBRDMT SUBQ TIS 1ST 20SQCM/<: CPT

## 2019-03-18 PROCEDURE — 15271 SKIN SUB GRAFT TRNK/ARM/LEG: CPT | Performed by: SPECIALIST

## 2019-03-18 PROCEDURE — 6370000000 HC RX 637 (ALT 250 FOR IP): Performed by: SPECIALIST

## 2019-03-18 RX ORDER — LIDOCAINE HYDROCHLORIDE 40 MG/ML
2.5 SOLUTION TOPICAL ONCE
Status: COMPLETED | OUTPATIENT
Start: 2019-03-18 | End: 2019-03-18

## 2019-03-18 RX ORDER — DOXYCYCLINE HYCLATE 100 MG
100 TABLET ORAL 2 TIMES DAILY
Qty: 62 TABLET | Refills: 0 | Status: SHIPPED | OUTPATIENT
Start: 2019-03-18 | End: 2019-04-18

## 2019-03-18 RX ORDER — CIPROFLOXACIN 500 MG/1
500 TABLET, FILM COATED ORAL 2 TIMES DAILY
Qty: 20 TABLET | Refills: 0 | Status: SHIPPED | OUTPATIENT
Start: 2019-03-18 | End: 2019-03-28

## 2019-03-18 RX ADMIN — LIDOCAINE HYDROCHLORIDE 2.5 ML: 40 SOLUTION TOPICAL at 08:56

## 2019-03-18 ASSESSMENT — PAIN DESCRIPTION - FREQUENCY: FREQUENCY: INTERMITTENT

## 2019-03-18 ASSESSMENT — PAIN DESCRIPTION - LOCATION: LOCATION: LEG

## 2019-03-18 ASSESSMENT — PAIN DESCRIPTION - PROGRESSION: CLINICAL_PROGRESSION: NOT CHANGED

## 2019-03-18 ASSESSMENT — PAIN SCALES - GENERAL: PAINLEVEL_OUTOF10: 3

## 2019-03-18 ASSESSMENT — PAIN DESCRIPTION - ONSET: ONSET: ON-GOING

## 2019-03-18 ASSESSMENT — PAIN DESCRIPTION - ORIENTATION: ORIENTATION: RIGHT

## 2019-03-18 ASSESSMENT — PAIN DESCRIPTION - DESCRIPTORS: DESCRIPTORS: ACHING

## 2019-03-18 ASSESSMENT — PAIN DESCRIPTION - PAIN TYPE: TYPE: CHRONIC PAIN

## 2019-03-25 ENCOUNTER — HOSPITAL ENCOUNTER (OUTPATIENT)
Dept: WOUND CARE | Age: 54
Discharge: HOME | End: 2019-03-25
Payer: MEDICAID

## 2019-03-25 VITALS
TEMPERATURE: 97.7 F | HEART RATE: 76 BPM | SYSTOLIC BLOOD PRESSURE: 137 MMHG | DIASTOLIC BLOOD PRESSURE: 85 MMHG | RESPIRATION RATE: 16 BRPM

## 2019-03-25 DIAGNOSIS — I89.0 CHRONIC ACQUIRED LYMPHEDEMA: ICD-10-CM

## 2019-03-25 DIAGNOSIS — Z79.01 CHRONIC ANTICOAGULATION: ICD-10-CM

## 2019-03-25 DIAGNOSIS — I82.220 THROMBOSIS OF INFERIOR VENA CAVA (HCC): ICD-10-CM

## 2019-03-25 DIAGNOSIS — I87.311 IDIOPATHIC CHRONIC VENOUS HYPERTENSION OF RIGHT LOWER EXTREMITY WITH ULCER (HCC): Primary | ICD-10-CM

## 2019-03-25 DIAGNOSIS — L97.919 IDIOPATHIC CHRONIC VENOUS HYPERTENSION OF RIGHT LOWER EXTREMITY WITH ULCER (HCC): Primary | ICD-10-CM

## 2019-03-25 DIAGNOSIS — I82.423 THROMBOSIS OF BOTH ILIAC VEINS (HCC): ICD-10-CM

## 2019-03-25 PROCEDURE — 6370000000 HC RX 637 (ALT 250 FOR IP): Performed by: SPECIALIST

## 2019-03-25 PROCEDURE — 29581 APPL MULTLAYER CMPRN SYS LEG: CPT | Performed by: SPECIALIST

## 2019-03-25 PROCEDURE — 29581 APPL MULTLAYER CMPRN SYS LEG: CPT

## 2019-03-25 RX ORDER — LIDOCAINE HYDROCHLORIDE 40 MG/ML
2.5 SOLUTION TOPICAL ONCE
Status: COMPLETED | OUTPATIENT
Start: 2019-03-25 | End: 2019-03-25

## 2019-03-25 RX ADMIN — LIDOCAINE HYDROCHLORIDE 2.5 ML: 40 SOLUTION TOPICAL at 08:51

## 2019-03-25 ASSESSMENT — PAIN DESCRIPTION - FREQUENCY: FREQUENCY: INTERMITTENT

## 2019-03-25 ASSESSMENT — PAIN DESCRIPTION - PAIN TYPE: TYPE: CHRONIC PAIN

## 2019-03-25 ASSESSMENT — PAIN DESCRIPTION - DESCRIPTORS: DESCRIPTORS: ACHING

## 2019-03-25 ASSESSMENT — PAIN DESCRIPTION - PROGRESSION: CLINICAL_PROGRESSION: NOT CHANGED

## 2019-03-25 ASSESSMENT — PAIN DESCRIPTION - LOCATION: LOCATION: LEG

## 2019-03-25 ASSESSMENT — PAIN DESCRIPTION - ORIENTATION: ORIENTATION: RIGHT

## 2019-03-25 ASSESSMENT — PAIN DESCRIPTION - ONSET: ONSET: ON-GOING

## 2019-03-25 ASSESSMENT — PAIN SCALES - GENERAL: PAINLEVEL_OUTOF10: 4

## 2019-03-29 ENCOUNTER — HOSPITAL ENCOUNTER (OUTPATIENT)
Dept: WOUND CARE | Age: 54
Discharge: HOME | End: 2019-03-29
Payer: MEDICAID

## 2019-03-29 VITALS
RESPIRATION RATE: 18 BRPM | SYSTOLIC BLOOD PRESSURE: 155 MMHG | DIASTOLIC BLOOD PRESSURE: 78 MMHG | HEART RATE: 78 BPM | TEMPERATURE: 97.8 F

## 2019-03-29 PROCEDURE — 29581 APPL MULTLAYER CMPRN SYS LEG: CPT

## 2019-03-29 ASSESSMENT — PAIN DESCRIPTION - ORIENTATION: ORIENTATION: RIGHT

## 2019-03-29 ASSESSMENT — PAIN DESCRIPTION - LOCATION: LOCATION: LEG

## 2019-03-29 ASSESSMENT — PAIN SCALES - GENERAL: PAINLEVEL_OUTOF10: 3

## 2019-03-29 ASSESSMENT — PAIN DESCRIPTION - PAIN TYPE: TYPE: CHRONIC PAIN

## 2019-04-01 ENCOUNTER — HOSPITAL ENCOUNTER (OUTPATIENT)
Dept: WOUND CARE | Age: 54
Discharge: HOME | End: 2019-04-01
Payer: MEDICAID

## 2019-04-01 VITALS
RESPIRATION RATE: 16 BRPM | TEMPERATURE: 97.7 F | SYSTOLIC BLOOD PRESSURE: 158 MMHG | DIASTOLIC BLOOD PRESSURE: 69 MMHG | HEART RATE: 82 BPM

## 2019-04-01 DIAGNOSIS — I89.0 CHRONIC ACQUIRED LYMPHEDEMA: ICD-10-CM

## 2019-04-01 DIAGNOSIS — I82.220 THROMBOSIS OF INFERIOR VENA CAVA (HCC): ICD-10-CM

## 2019-04-01 DIAGNOSIS — R60.0 LOCALIZED EDEMA: ICD-10-CM

## 2019-04-01 DIAGNOSIS — Z79.01 CHRONIC ANTICOAGULATION: ICD-10-CM

## 2019-04-01 DIAGNOSIS — I87.311 IDIOPATHIC CHRONIC VENOUS HYPERTENSION OF RIGHT LOWER EXTREMITY WITH ULCER (HCC): Primary | ICD-10-CM

## 2019-04-01 DIAGNOSIS — L97.919 IDIOPATHIC CHRONIC VENOUS HYPERTENSION OF RIGHT LOWER EXTREMITY WITH ULCER (HCC): Primary | ICD-10-CM

## 2019-04-01 PROCEDURE — 15271 SKIN SUB GRAFT TRNK/ARM/LEG: CPT

## 2019-04-01 PROCEDURE — 6370000000 HC RX 637 (ALT 250 FOR IP): Performed by: SPECIALIST

## 2019-04-01 PROCEDURE — 15271 SKIN SUB GRAFT TRNK/ARM/LEG: CPT | Performed by: SPECIALIST

## 2019-04-01 PROCEDURE — 29581 APPL MULTLAYER CMPRN SYS LEG: CPT

## 2019-04-01 PROCEDURE — 11045 DBRDMT SUBQ TISS EACH ADDL: CPT

## 2019-04-01 PROCEDURE — 11042 DBRDMT SUBQ TIS 1ST 20SQCM/<: CPT

## 2019-04-01 RX ORDER — LIDOCAINE HYDROCHLORIDE 40 MG/ML
2.5 SOLUTION TOPICAL ONCE
Status: COMPLETED | OUTPATIENT
Start: 2019-04-01 | End: 2019-04-01

## 2019-04-01 RX ADMIN — LIDOCAINE HYDROCHLORIDE 2.5 ML: 40 SOLUTION TOPICAL at 08:56

## 2019-04-01 ASSESSMENT — PAIN SCALES - GENERAL: PAINLEVEL_OUTOF10: 3

## 2019-04-01 ASSESSMENT — PAIN DESCRIPTION - LOCATION: LOCATION: LEG

## 2019-04-01 ASSESSMENT — PAIN DESCRIPTION - DESCRIPTORS: DESCRIPTORS: ACHING

## 2019-04-01 ASSESSMENT — PAIN DESCRIPTION - FREQUENCY: FREQUENCY: INTERMITTENT

## 2019-04-01 ASSESSMENT — PAIN DESCRIPTION - PAIN TYPE: TYPE: CHRONIC PAIN

## 2019-04-01 ASSESSMENT — PAIN DESCRIPTION - PROGRESSION: CLINICAL_PROGRESSION: NOT CHANGED

## 2019-04-01 ASSESSMENT — PAIN DESCRIPTION - ORIENTATION: ORIENTATION: RIGHT

## 2019-04-01 ASSESSMENT — PAIN DESCRIPTION - ONSET: ONSET: ON-GOING

## 2019-04-01 NOTE — PROGRESS NOTES
TheraSkin Treatment Note      Goal:  Patient will receive safe and proper application of skin substitute. Patient will comply with caring for dressing, offloading and reporting complications. · [x] Expiration date of TheraSkin checked immediately prior to use.  [x] Package intact prior to use and no damage noted.  [x] Transport temperature controlled and acceptable. TheraSkin was prepared for application by removing from package. TheraSkin was rinsed 2 times in room temperature normal saline for 5 seconds each time. A 2nd saline rinse was left on the TheraSkin until the provider was ready to apply it within 120 minutes of thawing. White side placed against ulcer bed. · [x] Theraskin was applied to wound on right medial lower leg wound and affixed with steri-strips by the   provider. · [x] Secondary dressing applied as ordered. · [x] Patient/caregiver was instructed not to remove dressing and to keep it clean and dry. See AVS for further instructions given to patient/caregiver. Theraskin may be applied a total of 5 times per wound over a 12 week period. Additionally Theraskin may only be used every 12 months per wound. Date of first application of Theraskin for this current wound is November 26, 2018.      Application # 8           Guidelines followed      Electronically signed by Enrike Nelson RN on 4/1/2019 at 3:08 PM

## 2019-04-01 NOTE — PROGRESS NOTES
[facility]  Progress Note and Procedure Note      [place]  MEDICAL RECORD NUMBER:  [id number]  AGE: 48 y.o. GENDER: male  : 1965  EPISODE DATE:  2019    Subjective:     Chief Complaint   Patient presents with    Wound Check     right lower leg wounds         HISTORY of PRESENT ILLNESS HPI     Rc Carcamo is a 48 y.o. male who presents today for wound/ulcer evaluation. History of Wound Context: Justin Baeza is a 48 y. o. 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 Seen at [address] [place] [address] where unsuccessful dilatation of IVC was done. He has been grafted with compression wraps and lymphedema pumps with near complete healing. However, he has gone back to work and is on his feet causing partial loss of graft. He now attempted to wear his compression stockings but he states they are difficult to wear. He remains working 6 days a week and he on his feet. Numerous attempts to encourage lifestyle with work are unsuccessful. He is now wearing his compression stockings who presents today for wound/ulcer evaluation He has undergone two applications of Theraskin allograft. A recent DVT study LLE positive but patient refused admission for treatment. Now undergoing series of allograft applications with continued re epithelialization.  He denies any pain or drainage        Pain Assessment:  Wound/Ulcer Pain Timing/Severity: none  Quality of pain: N/A  Severity:  0 / 10   Modifying Factors: None  Associated Signs/Symptoms: none    Ulcer Identification:  Ulcer Type: venous  Contributing Factors: edema, venous stasis, lymphedema, obesity and anticoagulation therapy    Objective:      BP (!) 158/69   Pulse 82   Temp 97.7 °F (36.5 °C) (Oral)   Resp 16     Wt Readings from Last 3 Encounters:   18 227 lb 6.4 oz (103.1 kg)   18 242 lb 8.1 oz (110 kg)   18 238 lb 1.6 oz (108 kg)       PHYSICAL EXAM    Extremities: no cyanosis, no clubbing, 2 +nonpitting edema-  bilateral  varicose veins noted-  bilateral  Two wounds with small residual allograft in place with evidence of re epithelialization    Assessment:      No diagnosis found. Procedure Note  Indications:  Based on my examination of this patient's wound(s) today, sharp excision is required to promote healing and evaluate the extent healing. Performed by: Skip Mallory MD    Consent obtained? Yes    Time out taken: Yes    Pain Control: Anesthetic: 4% Lidocaine Liquid Topical     Debridement:Excisional Debridement    Using curette the wound was sharply debrided    down through and including the removal of  epidermis, dermis, subcutaneous tissue and residual graft.     Devitalized Tissue Debrided:  fibrin and residual theraskin      Pre Debridement Measurements:  Are located in the Wound Documentation Flow Sheet   Wound #: 5 6     Post  Debridement Measurements:  Wound 12/03/18 #6 right lower leg posterior (since 5/14/2018) (Active)   Wound Image   12/12/2018  9:39 AM   Wound Venous 3/4/2019  9:05 AM   Dressing Status Old drainage 4/1/2019  8:36 AM   Dressing/Treatment Silicone Dressing 5/33/1623  9:20 AM   Wound Cleansed Rinsed/Irrigated with saline 4/1/2019  8:36 AM   Wound Length (cm) 4 cm 4/1/2019  8:36 AM   Wound Width (cm) 2.3 cm 4/1/2019  8:36 AM   Wound Depth (cm) 0.1 cm 4/1/2019  8:36 AM   Wound Surface Area (cm^2) 9.2 cm^2 4/1/2019  8:36 AM   Change in Wound Size % (l*w) -80.39 4/1/2019  8:36 AM   Wound Volume (cm^3) 0.92 cm^3 4/1/2019  8:36 AM   Wound Healing % -80 4/1/2019  8:36 AM   Post-Procedure Length (cm) 4.1 cm 4/1/2019  9:29 AM   Post-Procedure Width (cm) 2.4 cm 4/1/2019  9:29 AM   Post-Procedure Depth (cm) 0.3 cm 4/1/2019  9:29 AM   Post-Procedure Surface Area (cm^2) 9.84 cm^2 4/1/2019  9:29 AM   Post-Procedure Volume (cm^3) 2.95 cm^3 4/1/2019  9:29 AM   Distance Tunneling (cm) 0 cm 4/1/2019  8:36 AM   Tunneling Position ___ O'Clock 0 1/28/2019  8:25 AM   Undermining Starts ___ O'Clock 0 3/29/2019  3:17 PM   Undermining Ends___ O'Clock 0 3/29/2019  3:17 PM   Undermining Maxium Distance (cm) 0 4/1/2019  8:36 AM   Wound Assessment Red 4/1/2019  8:36 AM   Drainage Amount Small 4/1/2019  8:36 AM   Drainage Description Serosanguinous 4/1/2019  8:36 AM   Odor None 4/1/2019  8:36 AM   Margins Defined edges 4/1/2019  8:36 AM   Kiah-wound Assessment Pink 4/1/2019  8:36 AM   Non-staged Wound Description Full thickness 4/1/2019  8:36 AM   [place]%Wound Bed 0 3/18/2019  8:48 AM   Red%Wound Bed 30 4/1/2019  8:36 AM   Yellow%Wound Bed 40 3/18/2019  8:48 AM   Other%Wound Bed 70 % graft 4/1/2019  8:36 AM   Number of days: 119       Wound 12/31/18 #5 right lower leg medial ) since 1/2016)- venous/lymphedema (Active)   Wound Venous 1/30/2019  9:27 AM   Dressing Status Old drainage 3/25/2019  8:34 AM   Dressing/Treatment Silicone Dressing; Other (comment) 3/25/2019  9:20 AM   Wound Cleansed Rinsed/Irrigated with saline 4/1/2019  8:36 AM   Wound Length (cm) 3.3 cm 4/1/2019  8:36 AM   Wound Width (cm) 4 cm 4/1/2019  8:36 AM   Wound Depth (cm) 0.1 cm 4/1/2019  8:36 AM   Wound Surface Area (cm^2) 13.2 cm^2 4/1/2019  8:36 AM   Change in Wound Size % (l*w) 30.93 4/1/2019  8:36 AM   Wound Volume (cm^3) 1.32 cm^3 4/1/2019  8:36 AM   Wound Healing % 31 4/1/2019  8:36 AM   Post-Procedure Length (cm) 3.4 cm 4/1/2019  9:29 AM   Post-Procedure Width (cm) 4.1 cm 4/1/2019  9:29 AM   Post-Procedure Depth (cm) 0.3 cm 4/1/2019  9:29 AM   Post-Procedure Surface Area (cm^2) 13.94 cm^2 4/1/2019  9:29 AM   Post-Procedure Volume (cm^3) 4.18 cm^3 4/1/2019  9:29 AM   Distance Tunneling (cm) 0 cm 4/1/2019  8:36 AM   Tunneling Position ___ O'Clock 0 1/21/2019 10:32 AM   Undermining Starts ___ O'Clock 0 2/18/2019 11:10 AM   Undermining Maxium Distance (cm) 0 4/1/2019  8:36 AM   Wound Assessment Red 4/1/2019  8:36 AM   Drainage Amount Small 4/1/2019  8:36 AM   Drainage Description Serosanguinous 4/1/2019  8:36 AM   Odor None 4/1/2019 8:36 AM   Margins Defined edges 4/1/2019  8:36 AM   Kiah-wound Assessment Pink 4/1/2019  8:36 AM   Non-staged Wound Description Full thickness 4/1/2019  8:36 AM   [place]%Wound Bed 0 2/18/2019 11:10 AM   Red%Wound Bed 40 4/1/2019  8:36 AM   Yellow%Wound Bed 0 3/18/2019  8:48 AM   Black%Wound Bed 0 2/4/2019  8:49 AM   Other%Wound Bed 60% graft 4/1/2019  8:36 AM   Number of days: 91          Percent of Wound Debrided: 100%    Total Surface Area Debrided:  23.7 sq cm    Diabetic/Pressure/Non Pressure Ulcers only:  Ulcer: Non-Pressure ulcer, fat layer exposed    Bleeding: Minimal    Hemostasis Achieved: by pressure    Procedural Pain: 0  / 10     Post Procedural Pain: 0 / 10   Procedure:  Skin Substitute Application    Performed by: Delores Herrera MD    Ulcer Type: venous    Consent obtained: Yes    Time out taken: Yes    Product Utilized:     TheraSkin 13 sq/cm     Fenestrated: No    Mesher Utilized: No    Instrument(s) scissors and forceps    Skin Substitute was Applied to Ulcer Number(s):    Ulcer #: 5 and 6      Wound 12/03/18 #6 right lower leg posterior (since 5/14/2018) (Active)   Wound Image   12/12/2018  9:39 AM   Wound Venous 3/4/2019  9:05 AM   Dressing Status Old drainage 4/1/2019  8:36 AM   Dressing/Treatment Silicone Dressing 2/31/4274  9:20 AM   Wound Cleansed Rinsed/Irrigated with saline 4/1/2019  8:36 AM   Wound Length (cm) 4 cm 4/1/2019  8:36 AM   Wound Width (cm) 2.3 cm 4/1/2019  8:36 AM   Wound Depth (cm) 0.1 cm 4/1/2019  8:36 AM   Wound Surface Area (cm^2) 9.2 cm^2 4/1/2019  8:36 AM   Change in Wound Size % (l*w) -80.39 4/1/2019  8:36 AM   Wound Volume (cm^3) 0.92 cm^3 4/1/2019  8:36 AM   Wound Healing % -80 4/1/2019  8:36 AM   Post-Procedure Length (cm) 4.1 cm 4/1/2019  9:29 AM   Post-Procedure Width (cm) 2.4 cm 4/1/2019  9:29 AM   Post-Procedure Depth (cm) 0.3 cm 4/1/2019  9:29 AM   Post-Procedure Surface Area (cm^2) 9.84 cm^2 4/1/2019  9:29 AM   Post-Procedure Volume (cm^3) 2.95 cm^3 4/1/2019  9:29 AM   Distance Tunneling (cm) 0 cm 4/1/2019  8:36 AM   Tunneling Position ___ O'Clock 0 1/28/2019  8:25 AM   Undermining Starts ___ O'Clock 0 3/29/2019  3:17 PM   Undermining Ends___ O'Clock 0 3/29/2019  3:17 PM   Undermining Maxium Distance (cm) 0 4/1/2019  8:36 AM   Wound Assessment Red 4/1/2019  8:36 AM   Drainage Amount Small 4/1/2019  8:36 AM   Drainage Description Serosanguinous 4/1/2019  8:36 AM   Odor None 4/1/2019  8:36 AM   Margins Defined edges 4/1/2019  8:36 AM   Kiah-wound Assessment Pink 4/1/2019  8:36 AM   Non-staged Wound Description Full thickness 4/1/2019  8:36 AM   [place]%Wound Bed 0 3/18/2019  8:48 AM   Red%Wound Bed 30 4/1/2019  8:36 AM   Yellow%Wound Bed 40 3/18/2019  8:48 AM   Other%Wound Bed 70 % graft 4/1/2019  8:36 AM   Number of days: 119       Wound 12/31/18 #5 right lower leg medial ) since 1/2016)- venous/lymphedema (Active)   Wound Venous 1/30/2019  9:27 AM   Dressing Status Old drainage 3/25/2019  8:34 AM   Dressing/Treatment Silicone Dressing; Other (comment) 3/25/2019  9:20 AM   Wound Cleansed Rinsed/Irrigated with saline 4/1/2019  8:36 AM   Wound Length (cm) 3.3 cm 4/1/2019  8:36 AM   Wound Width (cm) 4 cm 4/1/2019  8:36 AM   Wound Depth (cm) 0.1 cm 4/1/2019  8:36 AM   Wound Surface Area (cm^2) 13.2 cm^2 4/1/2019  8:36 AM   Change in Wound Size % (l*w) 30.93 4/1/2019  8:36 AM   Wound Volume (cm^3) 1.32 cm^3 4/1/2019  8:36 AM   Wound Healing % 31 4/1/2019  8:36 AM   Post-Procedure Length (cm) 3.4 cm 4/1/2019  9:29 AM   Post-Procedure Width (cm) 4.1 cm 4/1/2019  9:29 AM   Post-Procedure Depth (cm) 0.3 cm 4/1/2019  9:29 AM   Post-Procedure Surface Area (cm^2) 13.94 cm^2 4/1/2019  9:29 AM   Post-Procedure Volume (cm^3) 4.18 cm^3 4/1/2019  9:29 AM   Distance Tunneling (cm) 0 cm 4/1/2019  8:36 AM   Tunneling Position ___ O'Clock 0 1/21/2019 10:32 AM   Undermining Starts ___ O'Clock 0 2/18/2019 11:10 AM   Undermining Maxium Distance (cm) 0 4/1/2019  8:36 AM   Wound Assessment Red 4/1/2019 8:36 AM   Drainage Amount Small 4/1/2019  8:36 AM   Drainage Description Serosanguinous 4/1/2019  8:36 AM   Odor None 4/1/2019  8:36 AM   Margins Defined edges 4/1/2019  8:36 AM   Kiah-wound Assessment Pink 4/1/2019  8:36 AM   Non-staged Wound Description Full thickness 4/1/2019  8:36 AM   [place]%Wound Bed 0 2/18/2019 11:10 AM   Red%Wound Bed 40 4/1/2019  8:36 AM   Yellow%Wound Bed 0 3/18/2019  8:48 AM   Black%Wound Bed 0 2/4/2019  8:49 AM   Other%Wound Bed 60% graft 4/1/2019  8:36 AM   Number of days: 91       Diabetic/Pressure/Non Pressure Ulcers:  Ulcer:   Non-Pressure ulcer, fat layer exposed      Total Surface Area of Ulcer(s) Covered 13 sq/cm    Was the Product Layered  No     Amount of Product Applied 13 sq/cm     Amount of Product Wasted 0 sq/cm     Reason for Waste N/A      Surgically Fixated: No    Secured With: Steri Strips and Mepitel     Procedural Pain: 0/10     Post Procedural Pain: 0 / 10    Response to Treatment: Well tolerated by patient. Response to treatment:  Well tolerated by patient. There has now been 3 applications of Theraskin on wound #6 and 8 applications on wound 5         Plan:     Treatment Note: Please see attached Discharge Instructions. These instructions were given and signed by the patient or POA    New Medication(s) at this visit:   New Prescriptions    No medications on file       In my professional opinion this patient would benefit from HBO Therapy: No       Patient is to return to wound care center in:  1 week(s)    Discharge [facility] Physician Orders and Discharge Instructions  [place] [address]  Telephone: [phone] hands with soap and water prior to and after every dressing change. Wound Cleansing:   · Do not scrub or use excessive force. · With each dressing change, rinse wounds with 0.9% Saline.  (May use wound wash or soft contact solution. Both can be purchased at a local drug store). · If unable to obtain saline, may use a gentle soap and water. · Keep wounds dry in the shower unless otherwise instructed by the physician. Topical Treatments:  Apply lotion to sofya wound      Application of a biologic skin substitute:   Theraskin application to Wound #5 - 8th application, #6- 3rd application- covered with mepitel, steri strips, gauze : Wound Location: wound #5 and #6   This is a highly specialized wound care dressing that is intended to enhance your own bodies ability to increase growth factors and other healing abilities. Please leave the dressing clean, dry and intact unless otherwise instructed by your physician and nursing. Edema Control:  Apply: X:  Compression Stocking  Left lower leg(s)      Apply every morning immediately when getting up. They should be applied to affected leg(s) from mid foot to knee making sure to cover the heel. Remove every night before going to bed. ? Elevate leg(s) above the level of the heart for 30 minutes 4-5 times a day and/or when sitting. ? Avoid prolonged standing in one place. Lymphedema Therapy:   ? Wear Lymphedema pumps twice a day at settings prescribed by your physician. ? Elevate leg(s) above the level of the heart for 30 minutes 4-5 times a day and/or when sitting. ? Avoid prolonged standing in one place. Multilayer Compression Wrap:  Type: 4 Layer Compression Wrap      Applied in Clinic to the :  Right lower leg(s)     · Do not get leg(s) with wrap wet. · If wraps become too tight call the center or completely remove the wrap. · Elevate leg(s) above the level of the heart when sitting. · Avoid prolonged standing in one place. ? [facility] remove wrap, cleanse affected leg(s) with soap and water, apply wound dressings as ordered above and reapply compression wraps on:          Dietary:  Important dietary reminders:  1.

## 2019-04-01 NOTE — PROGRESS NOTES
TheraSkin Treatment Note      Goal:  Patient will receive safe and proper application of skin substitute. Patient will comply with caring for dressing, offloading and reporting complications. · [x] Expiration date of TheraSkin checked immediately prior to use.  [x] Package intact prior to use and no damage noted.  [x] Transport temperature controlled and acceptable. TheraSkin was prepared for application by removing from package. TheraSkin was rinsed 2 times in room temperature normal saline for 5 seconds each time. A 2nd saline rinse was left on the TheraSkin until the provider was ready to apply it within 120 minutes of thawing. White side placed against ulcer bed. · [x] Theraskin was applied to wound on right posterior lower leg wound and affixed with steri-strips by the   provider. · [x] Secondary dressing applied as ordered. · [x] Patient/caregiver was instructed not to remove dressing and to keep it clean and dry. See AVS for further instructions given to patient/caregiver. Theraskin may be applied a total of 5 times per wound over a 12 week period. Additionally Theraskin may only be used every 12 months per wound. Date of first application of Theraskin for this current wound is March 4, 2019.      Application # 3           Guidelines followed      Electronically signed by Ines Potts RN on 4/1/2019 at 3:10 PM

## 2019-04-02 ENCOUNTER — TELEPHONE (OUTPATIENT)
Dept: FAMILY MEDICINE CLINIC | Age: 54
End: 2019-04-02

## 2019-04-02 DIAGNOSIS — Z79.01 CHRONIC ANTICOAGULATION: ICD-10-CM

## 2019-04-02 DIAGNOSIS — I82.5Z1 CHRONIC VENOUS EMBOLISM AND THROMBOSIS OF DEEP VESSELS OF DISTAL END OF RIGHT LOWER EXTREMITY (HCC): ICD-10-CM

## 2019-04-02 DIAGNOSIS — I82.220 THROMBOSIS OF INFERIOR VENA CAVA (HCC): ICD-10-CM

## 2019-04-02 DIAGNOSIS — I82.423 THROMBOSIS OF BOTH ILIAC VEINS (HCC): ICD-10-CM

## 2019-04-02 NOTE — TELEPHONE ENCOUNTER
Patient requesting refill      warfarin (COUMADIN) 5 MG tablet [[id number]]     Order Details   Dose, Route, Frequency: As Directed   Dispense Quantity: 145 tablet Refills: 1 Fills remaining: --         Sig: Take 10 mg on Fridays; take 7.5 mg on all other days OR as directed by clinic.        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

## 2019-04-03 RX ORDER — WARFARIN SODIUM 5 MG/1
TABLET ORAL
Qty: 145 TABLET | Refills: 1 | Status: SHIPPED | OUTPATIENT
Start: 2019-04-03 | End: 2019-08-27 | Stop reason: SDUPTHER

## 2019-04-08 ENCOUNTER — HOSPITAL ENCOUNTER (OUTPATIENT)
Dept: WOUND CARE | Age: 54
Discharge: HOME | End: 2019-04-08
Payer: MEDICAID

## 2019-04-08 VITALS
SYSTOLIC BLOOD PRESSURE: 125 MMHG | TEMPERATURE: 97.9 F | DIASTOLIC BLOOD PRESSURE: 76 MMHG | HEART RATE: 69 BPM | RESPIRATION RATE: 18 BRPM

## 2019-04-08 DIAGNOSIS — L97.919 IDIOPATHIC CHRONIC VENOUS HYPERTENSION OF RIGHT LOWER EXTREMITY WITH ULCER (HCC): Primary | ICD-10-CM

## 2019-04-08 DIAGNOSIS — I89.0 CHRONIC ACQUIRED LYMPHEDEMA: ICD-10-CM

## 2019-04-08 DIAGNOSIS — I87.311 IDIOPATHIC CHRONIC VENOUS HYPERTENSION OF RIGHT LOWER EXTREMITY WITH ULCER (HCC): Primary | ICD-10-CM

## 2019-04-08 DIAGNOSIS — R60.0 LOCALIZED EDEMA: ICD-10-CM

## 2019-04-08 DIAGNOSIS — I82.220 THROMBOSIS OF INFERIOR VENA CAVA (HCC): ICD-10-CM

## 2019-04-08 PROCEDURE — 29581 APPL MULTLAYER CMPRN SYS LEG: CPT

## 2019-04-08 PROCEDURE — 11042 DBRDMT SUBQ TIS 1ST 20SQCM/<: CPT | Performed by: SPECIALIST

## 2019-04-08 PROCEDURE — 11042 DBRDMT SUBQ TIS 1ST 20SQCM/<: CPT

## 2019-04-08 RX ORDER — DOXYCYCLINE HYCLATE 100 MG
100 TABLET ORAL 2 TIMES DAILY
Qty: 20 TABLET | Refills: 0 | Status: SHIPPED | OUTPATIENT
Start: 2019-04-08 | End: 2019-04-18 | Stop reason: SDUPTHER

## 2019-04-08 NOTE — PROGRESS NOTES
Multilayer Compression Wrap   (Not Unna) Below the Knee    NAME:  Latrice Calhoun  YOB: 1965  MEDICAL RECORD NUMBER:  [id number]  DATE:  4/8/2019       [] Removed old Multilayer wrap if present and washed leg with mild soap/water.  [x] Applied moisturizing agent to dry skin as needed.  [x] Applied primary and secondary dressing as ordered     [x] Applied multilayered dressing below the knee to Right lower leg(s)  (4 Layer Compression Wrap) per 's instructions.  [x] Instructed patient/caregiver not to remove dressing and to keep it clean and dry.  [x] Instructed patient/caregiver on complications to report to provider, such as pain, numbness in toes, heavy drainage, and slippage of dressing.  [x] Instructed patient on purpose of compression dressing and on activity and exercise recommendations.        Applied per   Guidelines    Electronically signed by Duke Simeon RN on 4/8/2019 at 9:18 AM

## 2019-04-08 NOTE — PROGRESS NOTES
theraskin graft in place medial wound RLE, granulating wound posterior RLE with no residual graft    Assessment:      No diagnosis found. Procedure Note  Indications:  Based on my examination of this patient's wound(s) today, sharp excision is required to promote healing and evaluate the extent healing. Performed by: Michael Leung MD    Consent obtained? Yes    Time out taken: Yes    Pain Control: Anesthetic: 4% Lidocaine Liquid Topical     Debridement:Excisional Debridement    Using curette the wound was sharply debrided    down through and including the removal of  epidermis, dermis and subcutaneous tissue.     Devitalized Tissue Debrided:  fibrin and residual graft      Pre Debridement Measurements:  Are located in the Wound Documentation Flow Sheet   Wound #: 6     Post  Debridement Measurements:  Wound 12/03/18 #6 right lower leg posterior (since 5/14/2018) (Active)   Wound Image   12/12/2018  9:39 AM   Wound Venous 3/4/2019  9:05 AM   Dressing Status Old drainage 4/1/2019  8:36 AM   Dressing/Treatment Collagen with Ag 4/8/2019  9:18 AM   Wound Cleansed Rinsed/Irrigated with saline 4/8/2019  8:43 AM   Wound Length (cm) 4 cm 4/8/2019  8:43 AM   Wound Width (cm) 2.3 cm 4/8/2019  8:43 AM   Wound Depth (cm) 0.1 cm 4/8/2019  8:43 AM   Wound Surface Area (cm^2) 9.2 cm^2 4/8/2019  8:43 AM   Change in Wound Size % (l*w) -80.39 4/8/2019  8:43 AM   Wound Volume (cm^3) 0.92 cm^3 4/8/2019  8:43 AM   Wound Healing % -80 4/8/2019  8:43 AM   Post-Procedure Length (cm) 4.1 cm 4/8/2019  9:18 AM   Post-Procedure Width (cm) 2.4 cm 4/8/2019  9:18 AM   Post-Procedure Depth (cm) 0.3 cm 4/8/2019  9:18 AM   Post-Procedure Surface Area (cm^2) 9.84 cm^2 4/8/2019  9:18 AM   Post-Procedure Volume (cm^3) 2.95 cm^3 4/8/2019  9:18 AM   Distance Tunneling (cm) 0 cm 4/8/2019  8:43 AM   Tunneling Position ___ O'Clock 0 1/28/2019  8:25 AM   Undermining Starts ___ O'Clock 0 3/29/2019  3:17 PM   Undermining Ends___ O'Clock 0 3/29/2019 3:17 PM   Undermining Maxium Distance (cm) 0 4/8/2019  8:43 AM   Wound Assessment Red 4/1/2019  8:36 AM   Drainage Amount Small 4/8/2019  8:43 AM   Drainage Description Serosanguinous 4/8/2019  8:43 AM   Odor None 4/8/2019  8:43 AM   Margins Defined edges 4/8/2019  8:43 AM   Kiah-wound Assessment Pink 4/8/2019  8:43 AM   Non-staged Wound Description Full thickness 4/8/2019  8:43 AM   [place]%Wound Bed 0 3/18/2019  8:48 AM   Red%Wound Bed 30 4/1/2019  8:36 AM   Yellow%Wound Bed 40 3/18/2019  8:48 AM   Other%Wound Bed 100 % graft 4/8/2019  8:43 AM   Number of days: 126       Wound 12/31/18 #5 right lower leg medial ) since 1/2016)- venous/lymphedema (Active)   Wound Venous 1/30/2019  9:27 AM   Dressing Status Old drainage 3/25/2019  8:34 AM   Dressing/Treatment Collagen with Ag 4/8/2019  9:18 AM   Wound Cleansed Rinsed/Irrigated with saline 4/8/2019  8:43 AM   Wound Length (cm) 3.2 cm 4/8/2019  8:43 AM   Wound Width (cm) 4 cm 4/8/2019  8:43 AM   Wound Depth (cm) 0.1 cm 4/8/2019  8:43 AM   Wound Surface Area (cm^2) 12.8 cm^2 4/8/2019  8:43 AM   Change in Wound Size % (l*w) 33.02 4/8/2019  8:43 AM   Wound Volume (cm^3) 1.28 cm^3 4/8/2019  8:43 AM   Wound Healing % 33 4/8/2019  8:43 AM   Post-Procedure Length (cm) 3.2 cm 4/8/2019  9:18 AM   Post-Procedure Width (cm) 4 cm 4/8/2019  9:18 AM   Post-Procedure Depth (cm) 0.1 cm 4/8/2019  9:18 AM   Post-Procedure Surface Area (cm^2) 12.8 cm^2 4/8/2019  9:18 AM   Post-Procedure Volume (cm^3) 1.28 cm^3 4/8/2019  9:18 AM   Distance Tunneling (cm) 0 cm 4/8/2019  8:43 AM   Tunneling Position ___ O'Clock 0 1/21/2019 10:32 AM   Undermining Starts ___ O'Clock 0 2/18/2019 11:10 AM   Undermining Maxium Distance (cm) 0 4/8/2019  8:43 AM   Wound Assessment Red 4/1/2019  8:36 AM   Drainage Amount Small 4/8/2019  8:43 AM   Drainage Description Serosanguinous 4/8/2019  8:43 AM   Odor None 4/8/2019  8:43 AM   Margins Defined edges 4/8/2019  8:43 AM   Kiah-wound Assessment Pink; White 4/8/2019 8:43 AM   Non-staged Wound Description Full thickness 4/8/2019  8:43 AM   [place]%Wound Bed 0 2/18/2019 11:10 AM   Red%Wound Bed 40 4/1/2019  8:36 AM   Yellow%Wound Bed 0 3/18/2019  8:48 AM   Black%Wound Bed 0 2/4/2019  8:49 AM   Other%Wound Bed 100 % graft 4/8/2019  8:43 AM   Number of days: 97          Percent of Wound Debrided: 50%    Total Surface Area Debrided:  4.9 sq cm    Diabetic/Pressure/Non Pressure Ulcers only:  Ulcer: Pressure ulcer, Stage 3    Bleeding: Minimal    Hemostasis Achieved: by pressure    Procedural Pain: 0  / 10     Post Procedural Pain: 0 / 10     Response to treatment:  Medial wound with allograft applications improving. Posterior wound essentially unchanged. Will speak to Dr. Radames Glez about seeing patient again, question  keeping wound from closing. Plan:     Treatment Note: Please see attached Discharge Instructions. These instructions were given and signed by the patient or POA    New Medication(s) at this visit:   New Prescriptions    DOXYCYCLINE HYCLATE (VIBRA-TABS) 100 MG TABLET    Take 1 tablet by mouth 2 times daily       In my professional opinion this patient would benefit from HBO Therapy: No       Patient is to return to wound care center in:  1 week(s)    Discharge [facility] Physician Orders and Discharge Instructions  [place] [address]  Telephone: [phone] hands with soap and water prior to and after every dressing change. Wound Cleansing:   · Do not scrub or use excessive force. · With each dressing change, rinse wounds with 0.9% Saline. (May use wound wash or soft contact solution. Both can be purchased at a local drug store). · If unable to obtain saline, may use a gentle soap and water. · Keep wounds dry in the shower unless otherwise instructed by the physician.     Topical Treatments:  Apply lotion to sofya wound    Dressings: tight call the center or completely remove the wrap. · Elevate leg(s) above the level of the heart when sitting. · Avoid prolonged standing in one place. Dietary:  Important dietary reminders:  1. Increase Protein intake (i.e. Lean meats, fish, eggs, legumes, and yogurt)  2. No added salt  3. If diabetic, follow a diabetic diet and check glucose prior to meals or as instructed by your physician. Return Appointment:   DME/Wound Dressing Supplies Provided by:   (Please call them directly to reorder supplies when you run out)   ECF or Home Healthcare:   Wound reassessment visit with Nurse :    Sandra Dao Return Appointment: With Dr. Shanika Lew   in  [facility]). Your nurse  is:  Yesenia     Electronically signed by Yuko Clemons RN on 4/8/2019 at [address] Information: Should you experience any significant changes in your wound(s) or have questions about your wound care, please contact the [address] [place] [address] at [phone] Monday-Friday from 8:00 am - 4:30 pm except for Wednesdays which hours are from 8:00 am - 2:00 pm.   If you need help with your wound outside these hours and cannot wait until we are again available, contact your PCP or go to the hospital emergency room. PLEASE NOTE: IF YOU ARE UNABLE TO OBTAIN WOUND SUPPLIES, CONTINUE TO USE THE SUPPLIES YOU HAVE AVAILABLE UNTIL YOU ARE ABLE TO REACH US. IT IS MOST IMPORTANT TO KEEP THE WOUND COVERED AT ALL TIMES.       [] Patient unable to sign Discharge Instructions given to ECF/Transportation/POA        Electronically signed by Yessy Velazquez MD on 4/8/2019 at 9:35 AM

## 2019-04-10 ENCOUNTER — ANTI-COAG VISIT (OUTPATIENT)
Dept: PHARMACY | Age: 54
End: 2019-04-10
Payer: MEDICAID

## 2019-04-10 DIAGNOSIS — I82.5Z9 CHRONIC VENOUS EMBOLISM AND THROMBOSIS OF DEEP VESSELS OF DISTAL LOWER EXTREMITY, UNSPECIFIED LATERALITY (HCC): ICD-10-CM

## 2019-04-10 LAB — INTERNATIONAL NORMALIZATION RATIO, POC: 2

## 2019-04-10 PROCEDURE — 99211 OFF/OP EST MAY X REQ PHY/QHP: CPT

## 2019-04-10 PROCEDURE — 85610 PROTHROMBIN TIME: CPT

## 2019-04-10 NOTE — PROGRESS NOTES
Yves Lee is a 48 y.o. male with PMHx significant for chronic DVT who presents to clinic 4/10/2019 for anticoagulation monitoring and adjustment. Patient has been taking warfarin for 15+ years.      Anticoagulation Indication(s):  DVT    Referring Physician:  Dr. Dawood Gomes  Goal INR Range:  2-3  Duration of Anticoagulation Therapy:  Indefinite  Time of day dose taken:  AM  Product patient has at home:  warfarin 5 mg (peach)    INR Summary                            Warfarin regimen (mg)  Date INR   A/P    Sun Mon Tue Wed Thu Fri Sat Mg/wk  4/10 2.0 At goal, no change  7.5 7.5 7.5 7.5 7.5 10 7.5 55  3/13 2.6 At goal, no change  7.5 7.5 7.5 7.5 7.5 10 7.5 55  2/27 2.7 At goal, no change  7.5 7.5 7.5 7.5 7.5 10 7.5 55  2/20 2.9 At goal, no change  7.5 7.5 7.5 7.5 7.5 10 7.5 55  2/13 2.1 At goal, no change  7.5 7.5 7.5 7.5 7.5 10 7.5 55  2/8 1.8 Below goal, increase  7.5 7.5 7.5 7.5 7.5 10 7.5 55  2/4 2.2 At goal, no change  7.5 7.5 7.5 7.5 7.5 7.5 7.5 52.5  2/1 1.9 Below goal, bolus  7.5 7.5 7.5 7.5 7.5 10/7.5 7.5 52.5  1/30 1.8 Below goal, increase  7.5 7.5 7.5 7.5 7.5 7.5 7.5 52.5  1/28 1.8 Below goal, increase  7.5 7.5 7.5 5 7.5 7.5 5 47.5  1/25 1.6 Below goal, bolus + Lovenox 7.5 INR    7.5 7.5 TBD  1/18 1.8 Below goal, continue  7.5 5 7.5 5 7.5 5 5 42.5  1/4 2.1 At goal, no change  7.5 5 7.5 5 7.5 5 5 42.5    Last CBC:  Lab Results   Component Value Date    RBC 3.37 (L) 04/28/2018    HGB 8.3 (L) 04/28/2018    HCT 25.4 (L) 04/28/2018    MCV 75.4 (L) 04/28/2018    MCH 24.6 (L) 04/28/2018    MPV 6.8 04/28/2018    RDW 18.3 (H) 04/28/2018     04/28/2018     Patient History:  Recent hospitalizations/HC visits 1/4 bilateral LE venous duplex showed acute totally occluding DVT of the left SFV, popliteal, gastrocnemius, and posterior tibial veins   Recent medication changes -None reported today  -Cipro 500 mg BID 3/11-3/21 (completed)   Medications taken regularly that may interact with warfarin or alter INR Doxycycline   Warfarin dose taken as prescribed Yes - does not use pillbox (only takes two medications)   Signs/symptoms of bleeding No h/o bleeding reported   Vitamin K intake Normally avoids high vitamin K foods: ~0-1 serving per week   Recent vomiting/diarrhea/fever, changes in weight or activity level None reported   Tobacco or alcohol use Patient reports smoking 0 PPD  Patient reports having 0 drinks per day   Upcoming surgeries or procedures None reported     Assessment/Plan:  Patient's INR was therapeutic today (2.0). Patient denies any changes today (Cipro completed). Patient continues to avoid high vitamin K foods. Patient was instructed to continue warfarin 7.5 mg daily, except 10 mg on Fridays. Repeat INR in 3 weeks. Patient was reminded to maintain consistent vitamin K intake and call with any bleeding, medication changes, or fever/vomiting/diarrhea. Patient understands dosing directions and information discussed. Dosing schedule and follow up appointment given to patient. Progress note routed to referring physician's office. Patient acknowledges working in consult agreement with pharmacist as referred by his/her physician. Next Clinic Appointment:  5/1 (prefers appts at beginning of month if possible)    Please call [facility] at [phone] with any questions. Thanks! Brennan Tao.  Tena MoyaD  [facility]  Ph: [phone]  4/10/2019 11:05 AM

## 2019-04-11 ENCOUNTER — TELEPHONE (OUTPATIENT)
Dept: WOUND CARE | Age: 54
End: 2019-04-11

## 2019-04-11 NOTE — TELEPHONE ENCOUNTER
Called patient and instructed him to make appointment with Dr. Andria Padilla for evaluation of perforators/reflux with non healing wounds on right lower leg. Dr. Bryan Muniz called discussed patient with Dr. Andria Padilla. Patient agreed to make appointment.

## 2019-04-12 ENCOUNTER — TELEPHONE (OUTPATIENT)
Dept: WOUND CARE | Age: 54
End: 2019-04-12

## 2019-04-12 NOTE — TELEPHONE ENCOUNTER
Called hospitals and spoke with Umer Lawrence on  04/11/19 regarding denial for puraply AM. She stated she would investigate further and return call. I called back on Friday 04/12/19 and representative from hospitals stated [facility] requires PA and requesting 1 month of clinical notes. Doctors progress notes faxed to hospitals.

## 2019-04-15 ENCOUNTER — HOSPITAL ENCOUNTER (OUTPATIENT)
Dept: WOUND CARE | Age: 54
Discharge: HOME | End: 2019-04-15
Payer: MEDICAID

## 2019-04-15 VITALS
RESPIRATION RATE: 18 BRPM | TEMPERATURE: 97.7 F | SYSTOLIC BLOOD PRESSURE: 137 MMHG | DIASTOLIC BLOOD PRESSURE: 76 MMHG | HEART RATE: 73 BPM

## 2019-04-15 DIAGNOSIS — I87.311 IDIOPATHIC CHRONIC VENOUS HYPERTENSION OF RIGHT LOWER EXTREMITY WITH ULCER (HCC): ICD-10-CM

## 2019-04-15 DIAGNOSIS — I89.0 CHRONIC ACQUIRED LYMPHEDEMA: Primary | ICD-10-CM

## 2019-04-15 DIAGNOSIS — I82.423 THROMBOSIS OF BOTH ILIAC VEINS (HCC): ICD-10-CM

## 2019-04-15 DIAGNOSIS — I82.220 THROMBOSIS OF INFERIOR VENA CAVA (HCC): ICD-10-CM

## 2019-04-15 DIAGNOSIS — L97.919 IDIOPATHIC CHRONIC VENOUS HYPERTENSION OF RIGHT LOWER EXTREMITY WITH ULCER (HCC): ICD-10-CM

## 2019-04-15 PROCEDURE — 11042 DBRDMT SUBQ TIS 1ST 20SQCM/<: CPT | Performed by: SPECIALIST

## 2019-04-15 PROCEDURE — 29581 APPL MULTLAYER CMPRN SYS LEG: CPT

## 2019-04-15 PROCEDURE — 11042 DBRDMT SUBQ TIS 1ST 20SQCM/<: CPT

## 2019-04-15 RX ORDER — LIDOCAINE HYDROCHLORIDE 40 MG/ML
SOLUTION TOPICAL ONCE
Status: DISCONTINUED | OUTPATIENT
Start: 2019-04-15 | End: 2019-04-16 | Stop reason: HOSPADM

## 2019-04-15 RX ORDER — DOXYCYCLINE HYCLATE 100 MG
100 TABLET ORAL 2 TIMES DAILY
Qty: 60 TABLET | Refills: 0 | Status: SHIPPED | OUTPATIENT
Start: 2019-04-15 | End: 2019-04-18 | Stop reason: SDUPTHER

## 2019-04-15 ASSESSMENT — PAIN DESCRIPTION - PROGRESSION: CLINICAL_PROGRESSION: NOT CHANGED

## 2019-04-15 ASSESSMENT — PAIN DESCRIPTION - ONSET: ONSET: ON-GOING

## 2019-04-15 ASSESSMENT — PAIN DESCRIPTION - DESCRIPTORS: DESCRIPTORS: ACHING

## 2019-04-15 ASSESSMENT — PAIN DESCRIPTION - PAIN TYPE: TYPE: ACUTE PAIN

## 2019-04-15 ASSESSMENT — PAIN DESCRIPTION - LOCATION: LOCATION: FOOT

## 2019-04-15 ASSESSMENT — PAIN DESCRIPTION - ORIENTATION: ORIENTATION: RIGHT

## 2019-04-15 ASSESSMENT — PAIN DESCRIPTION - FREQUENCY: FREQUENCY: INTERMITTENT

## 2019-04-15 ASSESSMENT — PAIN SCALES - GENERAL: PAINLEVEL_OUTOF10: 2

## 2019-04-15 NOTE — PROGRESS NOTES
[address] [facility]  Progress Note and Procedure Note      Klever Velazco  MEDICAL RECORD NUMBER:  [id number]  AGE: 48 y.o. GENDER: male  : 1965  EPISODE DATE:  4/15/2019    Subjective:     Chief Complaint   Patient presents with    Wound Check     right lower leg wound         HISTORY of PRESENT ILLNESS HPI     Klever Velazco is a 48 y.o. male who presents today for wound/ulcer evaluation. History of Wound Context: Justin Baeza is a 48 y. o. 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 Seen at [address] [place] [address] where unsuccessful dilatation of IVC was done. He has been grafted with compression wraps and lymphedema pumps with near complete healing. However, he has gone back to work and is on his feet causing partial loss of graft. He now attempted to wear his compression stockings but he states they are difficult to wear. He remains working 6 days a week and he on his feet. Numerous attempts to encourage lifestyle with work are unsuccessful. He is now wearing his compression stockings who presents today for wound/ulcer evaluation He has undergone two applications of Theraskin allograft. A recent DVT study LLE positive but patient refused admission for treatment. Now undergoing series of allograft applications with continued re epithelialization.  He denies any pain or drainage           Pain Assessment:  Wound/Ulcer Pain Timing/Severity: none  Quality of pain: N/A  Severity:  0 / 10   Modifying Factors: None  Associated Signs/Symptoms: none    Ulcer Identification:  Ulcer Type: venous  Contributing Factors: edema, venous stasis, lymphedema and obesity    Objective:      /76   Pulse 73   Temp 97.7 °F (36.5 °C) (Oral)   Resp 18     Wt Readings from Last 3 Encounters:   18 227 lb 6.4 oz (103.1 kg)   18 242 lb 8.1 oz (110 kg)   18 238 lb 1.6 oz (108 kg)       PHYSICAL EXAM    Extremities: no cyanosis, no clubbing, 2 +nonpitting edema; small Ends___ O'Clock 0 3/29/2019  3:17 PM   Undermining Maxium Distance (cm) 0 4/8/2019  8:43 AM   Wound Assessment Red 4/1/2019  8:36 AM   Drainage Amount Small 4/15/2019  9:04 AM   Drainage Description Serosanguinous 4/15/2019  9:04 AM   Odor None 4/15/2019  9:04 AM   Margins Undefined edges; Defined edges 4/15/2019  9:04 AM   Kiah-wound Assessment Pink; White 4/15/2019  9:04 AM   Non-staged Wound Description Full thickness 4/15/2019  9:04 AM   [place]%Wound Bed 0 3/18/2019  8:48 AM   Red%Wound Bed 80 4/15/2019  9:04 AM   Yellow%Wound Bed 20 4/15/2019  9:04 AM   Other%Wound Bed 100 % graft 4/8/2019  8:43 AM   Number of days: 133       Wound 12/31/18 #5 right lower leg medial ) since 1/2016)- venous/lymphedema (Active)   Wound Venous 1/30/2019  9:27 AM   Dressing Status Old drainage 3/25/2019  8:34 AM   Dressing/Treatment Collagen with Ag 4/15/2019  9:19 AM   Wound Cleansed Rinsed/Irrigated with saline 4/15/2019  9:04 AM   Wound Length (cm) 2.9 cm 4/15/2019  9:04 AM   Wound Width (cm) 3.8 cm 4/15/2019  9:04 AM   Wound Depth (cm) 0.1 cm 4/15/2019  9:04 AM   Wound Surface Area (cm^2) 11.02 cm^2 4/15/2019  9:04 AM   Change in Wound Size % (l*w) 42.33 4/15/2019  9:04 AM   Wound Volume (cm^3) 1.1 cm^3 4/15/2019  9:04 AM   Wound Healing % 42 4/15/2019  9:04 AM   Post-Procedure Length (cm) 3 cm 4/15/2019  9:19 AM   Post-Procedure Width (cm) 3.9 cm 4/15/2019  9:19 AM   Post-Procedure Depth (cm) 0.3 cm 4/15/2019  9:19 AM   Post-Procedure Surface Area (cm^2) 11.7 cm^2 4/15/2019  9:19 AM   Post-Procedure Volume (cm^3) 3.51 cm^3 4/15/2019  9:19 AM   Distance Tunneling (cm) 0 cm 4/8/2019  8:43 AM   Tunneling Position ___ O'Clock 0 1/21/2019 10:32 AM   Undermining Starts ___ O'Clock 0 2/18/2019 11:10 AM   Undermining Maxium Distance (cm) 0 4/8/2019  8:43 AM   Wound Assessment Red;[place] 4/15/2019  9:04 AM   Drainage Amount Moderate 4/15/2019  9:04 AM   Drainage Description Serosanguinous 4/15/2019  9:04 AM   Odor Mild 4/15/2019  9:04 AM Margins Defined edges 4/15/2019  9:04 AM   Kiah-wound Assessment Bec[place];Pink;Purple 4/15/2019  9:04 AM   Non-staged Wound Description Full thickness 4/15/2019  9:04 AM   [place]%Wound Bed 0 2/18/2019 11:10 AM   Red%Wound Bed 40 4/15/2019  9:04 AM   Yellow%Wound Bed 0 3/18/2019  8:48 AM   Black%Wound Bed 0 2/4/2019  8:49 AM   Other%Wound Bed 100 % graft 4/8/2019  8:43 AM   Number of days: 105          Percent of Wound Debrided: 50    Total Surface Area Debrided:  9.8 sq cm    Diabetic/Pressure/Non Pressure Ulcers only:  Ulcer: Non-Pressure ulcer, fat layer exposed    Bleeding: Minimal    Hemostasis Achieved: by pressure    Procedural Pain: 0  / 10     Post Procedural Pain: 0 / 10     Response to treatment:  Well tolerated by patient. I have asked Dr. Kevni Dowd to see patient once again for evaluation of potential treatment of perforators complicating wound closure. Plan:     Treatment Note: Please see attached Discharge Instructions. These instructions were given and signed by the patient or POA    New Medication(s) at this visit:   New Prescriptions    DOXYCYCLINE HYCLATE (VIBRA-TABS) 100 MG TABLET    Take 1 tablet by mouth 2 times daily       In my professional opinion this patient would benefit from HBO Therapy: No       Patient is to return to wound care center in:  1 week(s)    Discharge [facility] Physician Orders and Discharge Instructions  [facility] [place] [address]  Telephone: [phone] hands with soap and water prior to and after every dressing change. Wound Cleansing: vashe wash soaked gauze for 5 minutes before dressing change  · Do not scrub or use excessive force. · With each dressing change, rinse wounds with 0.9% Saline. (May use wound wash or soft contact solution. Both can be purchased at a local drug store). · If unable to obtain saline, may use a gentle soap and water.   · Keep wounds dry in the shower unless otherwise instructed by the physician. Topical Treatments:  Lotion to sofya wound of right lower leg     Dressings:           Wound Location: right lower leg wounds     [x] Apply Primary Dressing to wound:       [] Foam/Foam with Border(i.e Mepilex) [] Non-adherent (i.e.Mepitel)   [] Alginate with Silver (i.e. Silvercel)  [] Alginate (i.e. Aquacel)   [] Collagen (i.e. Puracol)   [x] Collagen with Silver(i.e. Candice)     [] Hydrocolloid    [] Hydrafera Blue moistened with saline   [] MediHoney Paste/Gel   [] Hydrogel    [] Endoform      [] Santyl covered with gauze moisten with saline    [] Bactroban/Mupirocin   [] Polysporin/Neosporin  [x] Other:      Pack wound loosely with: [] Iodoform   [] Plain Packing  [] Saline \"wet to dry\"      [x] Cover and Secure with:     [x] Dry Gauze  [] ABD [] Cast padding [] Kerlix or Lyndsey rolled gauze   [] Coban  [] Ace Wrap [] Ace Wrap from forefoot to just below the knee [] Paper Tape [] Medipore Tape [] Other:    Avoid contact of tape with skin if possible. [x] Change dressing: [] Daily    [] Every Other Day  [] Three times per week:  [] Monday, Wednesday, Friday  [] Tuesday, Thursday, Saturday   [] Once a week  [x] Do Not Change Dressing [] Other:          Edema Control:  Apply: [x] Compression Stocking  Left lower leg(s)      Apply every morning immediately when getting up. They should be applied to affected leg(s) from mid foot to knee making sure to cover the heel. Remove every night before going to bed. [x] Elevate leg(s) above the level of the heart for 30 minutes 4-5 times a day and/or when sitting. [x] Avoid prolonged standing in one place. Lymphedema Therapy:   [x] Wear Lymphedema pumps twice a day at settings prescribed by your physician. [x]Elevate leg(s) above the level of the heart for 30 minutes 4-5 times a day and/or when sitting. [x] Avoid prolonged standing in one place.     Multilayer Compression Wrap:  Type: 4 Layer Compression Wrap      Applied in Clinic to the :  Right lower leg(s)     · Do not get leg(s) with wrap wet. · If wraps become too tight call the center or completely remove the wrap. · Elevate leg(s) above the level of the heart when sitting. · Avoid prolonged standing in one place. Dietary:  Important dietary reminders:  1. Increase Protein intake (i.e. Lean meats, fish, eggs, legumes, and yogurt)  2. No added salt  3. If diabetic, follow a diabetic diet and check glucose prior to meals or as instructed by your physician. Return Appointment:   DME/Wound Dressing Supplies Provided by:   (Please call them directly to reorder supplies when you run out)   ECF or Home Healthcare:   Wound reassessment visit with Nurse :    Kenia Arshad Return Appointment: With Dr. Dian Mcmahon   in  [address] [place] [address])    Your nurse  is:  Arn Boas     Electronically signed by Tino Hurley RN on 4/15/2019 at 9:31 R Aileen Cardoso 8 Information: Should you experience any significant changes in your wound(s) or have questions about your wound care, please contact the [address] [place] [address] at [phone] Monday-Friday from 8:00 am - 4:30 pm except for Wednesdays which hours are from 8:00 am - 2:00 pm.   If you need help with your wound outside these hours and cannot wait until we are again available, contact your PCP or go to the hospital emergency room. PLEASE NOTE: IF YOU ARE UNABLE TO OBTAIN WOUND SUPPLIES, CONTINUE TO USE THE SUPPLIES YOU HAVE AVAILABLE UNTIL YOU ARE ABLE TO REACH US. IT IS MOST IMPORTANT TO KEEP THE WOUND COVERED AT ALL TIMES.       [] Patient unable to sign Discharge Instructions given to ECF/Transportation/POA        Electronically signed by Marc Casas MD on 4/15/2019 at 1:38 PM

## 2019-04-15 NOTE — PROGRESS NOTES
Multilayer Compression Wrap   (Not Unna) Below the Knee    NAME:  Sahara Hodges  YOB: 1965  MEDICAL RECORD NUMBER:  [id number]  DATE:  4/15/2019       [x] Removed old Multilayer wrap if present and washed leg with mild soap/water.  [x] Applied moisturizing agent to dry skin as needed.  [x] Applied primary and secondary dressing as ordered     [x] Applied multilayered dressing below the knee to Right lower leg(s)  (4 Layer Compression Wrap) per 's instructions.  [x] Instructed patient/caregiver not to remove dressing and to keep it clean and dry.  [x] Instructed patient/caregiver on complications to report to provider, such as pain, numbness in toes, heavy drainage, and slippage of dressing.  [x] Instructed patient on purpose of compression dressing and on activity and exercise recommendations.        Applied per   Guidelines    Electronically signed by Yuko Clemons RN on 4/15/2019 at 9:34 AM

## 2019-04-18 ENCOUNTER — HOSPITAL ENCOUNTER (OUTPATIENT)
Dept: WOUND CARE | Age: 54
Discharge: HOME | End: 2019-04-18
Payer: MEDICAID

## 2019-04-18 VITALS
RESPIRATION RATE: 18 BRPM | TEMPERATURE: 98.5 F | SYSTOLIC BLOOD PRESSURE: 129 MMHG | HEART RATE: 68 BPM | DIASTOLIC BLOOD PRESSURE: 77 MMHG

## 2019-04-18 PROCEDURE — 29581 APPL MULTLAYER CMPRN SYS LEG: CPT

## 2019-04-18 ASSESSMENT — PAIN DESCRIPTION - PROGRESSION: CLINICAL_PROGRESSION: NOT CHANGED

## 2019-04-18 ASSESSMENT — PAIN DESCRIPTION - DESCRIPTORS: DESCRIPTORS: ACHING

## 2019-04-18 ASSESSMENT — PAIN SCALES - GENERAL: PAINLEVEL_OUTOF10: 4

## 2019-04-18 ASSESSMENT — PAIN DESCRIPTION - LOCATION: LOCATION: LEG

## 2019-04-18 ASSESSMENT — PAIN DESCRIPTION - ONSET: ONSET: ON-GOING

## 2019-04-18 ASSESSMENT — PAIN DESCRIPTION - PAIN TYPE: TYPE: CHRONIC PAIN

## 2019-04-18 ASSESSMENT — PAIN DESCRIPTION - FREQUENCY: FREQUENCY: INTERMITTENT

## 2019-04-18 ASSESSMENT — PAIN DESCRIPTION - ORIENTATION: ORIENTATION: RIGHT

## 2019-04-18 NOTE — PLAN OF CARE
Multilayer Compression Wrap   (Not Unna) Below the Knee    NAME:  Natalee Packer  YOB: 1965  MEDICAL RECORD NUMBER:  [id number]  DATE:  4/18/2019       [x] Removed old Multilayer wrap if present and washed leg with mild soap/water.  [x] Applied moisturizing agent to dry skin as needed.  [x] Applied primary and secondary dressing as ordered     [x] Applied multilayered dressing below the knee to Right lower leg(s)  (4 Layer Compression Wrap) per 's instructions.  [x] Instructed patient/caregiver not to remove dressing and to keep it clean and dry.  [x] Instructed patient/caregiver on complications to report to provider, such as pain, numbness in toes, heavy drainage, and slippage of dressing.  [x] Instructed patient on purpose of compression dressing and on activity and exercise recommendations.        Applied per   Guidelines    Electronically signed by Laurel Rasheed RN on 4/18/2019 at 8:49 AM

## 2019-04-24 ENCOUNTER — HOSPITAL ENCOUNTER (OUTPATIENT)
Dept: WOUND CARE | Age: 54
Discharge: HOME | End: 2019-04-24
Payer: MEDICAID

## 2019-04-24 VITALS
RESPIRATION RATE: 18 BRPM | TEMPERATURE: 98 F | DIASTOLIC BLOOD PRESSURE: 90 MMHG | SYSTOLIC BLOOD PRESSURE: 136 MMHG | HEART RATE: 81 BPM

## 2019-04-24 DIAGNOSIS — I87.311 IDIOPATHIC CHRONIC VENOUS HYPERTENSION OF RIGHT LOWER EXTREMITY WITH ULCER (HCC): ICD-10-CM

## 2019-04-24 DIAGNOSIS — L97.919 IDIOPATHIC CHRONIC VENOUS HYPERTENSION OF RIGHT LOWER EXTREMITY WITH ULCER (HCC): ICD-10-CM

## 2019-04-24 DIAGNOSIS — R60.0 LOCALIZED EDEMA: ICD-10-CM

## 2019-04-24 DIAGNOSIS — I89.0 CHRONIC ACQUIRED LYMPHEDEMA: Primary | ICD-10-CM

## 2019-04-24 DIAGNOSIS — I82.220 THROMBOSIS OF INFERIOR VENA CAVA (HCC): ICD-10-CM

## 2019-04-24 PROCEDURE — 11042 DBRDMT SUBQ TIS 1ST 20SQCM/<: CPT | Performed by: SPECIALIST

## 2019-04-24 PROCEDURE — 6370000000 HC RX 637 (ALT 250 FOR IP): Performed by: SPECIALIST

## 2019-04-24 PROCEDURE — 11042 DBRDMT SUBQ TIS 1ST 20SQCM/<: CPT

## 2019-04-24 RX ORDER — DOXYCYCLINE HYCLATE 100 MG/1
100 CAPSULE ORAL 2 TIMES DAILY
COMMUNITY
End: 2019-05-20

## 2019-04-24 RX ORDER — LIDOCAINE HYDROCHLORIDE 40 MG/ML
2.5 SOLUTION TOPICAL ONCE
Status: COMPLETED | OUTPATIENT
Start: 2019-04-24 | End: 2019-04-24

## 2019-04-24 RX ADMIN — LIDOCAINE HYDROCHLORIDE 2.5 ML: 40 SOLUTION TOPICAL at 08:42

## 2019-04-24 ASSESSMENT — PAIN DESCRIPTION - FREQUENCY: FREQUENCY: INTERMITTENT

## 2019-04-24 ASSESSMENT — PAIN DESCRIPTION - LOCATION: LOCATION: LEG

## 2019-04-24 ASSESSMENT — PAIN SCALES - GENERAL: PAINLEVEL_OUTOF10: 2

## 2019-04-24 ASSESSMENT — PAIN DESCRIPTION - ORIENTATION: ORIENTATION: RIGHT

## 2019-04-24 ASSESSMENT — PAIN DESCRIPTION - PAIN TYPE: TYPE: ACUTE PAIN

## 2019-04-24 ASSESSMENT — PAIN DESCRIPTION - DESCRIPTORS: DESCRIPTORS: BURNING

## 2019-04-24 NOTE — PROGRESS NOTES
[facility]  Progress Note and Procedure Note      Ihsan Galdamez  MEDICAL RECORD NUMBER:  [id number]  AGE: 48 y.o. GENDER: male  : 1965  EPISODE DATE:  2019    Subjective:     Chief Complaint   Patient presents with    Wound Check     right lower leg         HISTORY of PRESENT ILLNESS HPI     Ihsan Galdamez is a 48 y.o. male who presents today for wound/ulcer evaluation. History of Wound Context:Justin Baeza is a 48 y. o. 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 Seen at [address] [place] [address] where unsuccessful dilatation of IVC was done. He has been grafted with compression wraps and lymphedema pumps with near complete healing. However, he has gone back to work and is on his feet causing partial loss of graft. He now attempted to wear his compression stockings but he states they are difficult to wear. He remains working 6 days a week and he on his feet. Numerous attempts to encourage lifestyle with work are unsuccessful. He is now wearing his compression stockings who presents today for wound/ulcer evaluation He has undergone two applications of Theraskin allograft. A recent DVT study LLE positive but patient refused admission for treatment. Now undergoing series of allograft applications with continued re epithelialization. He denies any pain or drainage   Await Dr. Chantelle Silvestre input as far as potential perforators keeping from complete wound closure.          Pain Assessment:  Wound/Ulcer Pain Timing/Severity: none  Quality of pain: N/A  Severity:  0 / 10   Modifying Factors: None  Associated Signs/Symptoms: none    Ulcer Identification:  Ulcer Type: venous  Contributing Factors: edema, venous stasis, lymphedema and obesity    Objective:      BP (!) 136/90   Pulse 81   Temp 98 °F (36.7 °C) (Oral)   Resp 18     Wt Readings from Last 3 Encounters:   18 227 lb 6.4 oz (103.1 kg)   18 242 lb 8.1 oz (110 kg)   18 238 lb 1.6 oz (108 kg) PHYSICAL EXAM    General Appearance: alert and oriented to person, place and time, well-developed and well-nourished, in no acute distress  Extremities: no cyanosis, no clubbing, 2 +nonpitting edema-  bilateral  with two small granulating wounds right medial and right posterior leg, see measurements    Assessment:      No diagnosis found. Procedure Note  Indications:  Based on my examination of this patient's wound(s) today, sharp excision is required to promote healing and evaluate the extent healing. Performed by: Alex Evans MD    Consent obtained? Yes    Time out taken: Yes    Pain Control: Anesthetic: 4% Lidocaine Liquid Topical     Debridement:Excisional Debridement    Using curette the wound was sharply debrided    down through and including the removal of  epidermis, dermis and subcutaneous tissue.     Devitalized Tissue Debrided:  fibrin      Pre Debridement Measurements:  Are located in the Wound Documentation Flow Sheet   Wound #: 6     Post  Debridement Measurements:  Wound 12/03/18 #6 right lower leg posterior (since 5/14/2018) (Active)   Wound Image   12/12/2018  9:39 AM   Wound Venous 3/4/2019  9:05 AM   Dressing Status Old drainage 4/1/2019  8:36 AM   Dressing/Treatment Collagen 4/24/2019  8:44 AM   Wound Cleansed Rinsed/Irrigated with saline 4/24/2019  8:37 AM   Wound Length (cm) 2.2 cm 4/24/2019  8:37 AM   Wound Width (cm) 1.5 cm 4/24/2019  8:37 AM   Wound Depth (cm) 0.1 cm 4/24/2019  8:37 AM   Wound Surface Area (cm^2) 3.3 cm^2 4/24/2019  8:37 AM   Change in Wound Size % (l*w) 35.29 4/24/2019  8:37 AM   Wound Volume (cm^3) 0.33 cm^3 4/24/2019  8:37 AM   Wound Healing % 35 4/24/2019  8:37 AM   Post-Procedure Length (cm) 2.3 cm 4/24/2019  8:44 AM   Post-Procedure Width (cm) 1.6 cm 4/24/2019  8:44 AM   Post-Procedure Depth (cm) 0.3 cm 4/24/2019  8:44 AM   Post-Procedure Surface Area (cm^2) 3.68 cm^2 4/24/2019  8:44 AM   Post-Procedure Volume (cm^3) 1.1 cm^3 4/24/2019  8:44 AM Distance Tunneling (cm) 0 cm 4/18/2019  8:40 AM   Tunneling Position ___ O'Clock 0 1/28/2019  8:25 AM   Undermining Starts ___ O'Clock 0 3/29/2019  3:17 PM   Undermining Ends___ O'Clock 0 3/29/2019  3:17 PM   Undermining Maxium Distance (cm) 0 4/8/2019  8:43 AM   Wound Assessment Red;Yellow 4/24/2019  8:37 AM   Drainage Amount Small 4/24/2019  8:37 AM   Drainage Description [place] Necessary 4/24/2019  8:37 AM   Odor None 4/24/2019  8:37 AM   Margins Defined edges 4/24/2019  8:37 AM   Kiah-wound Assessment Dry;Pink 4/24/2019  8:37 AM   Non-staged Wound Description Full thickness 4/24/2019  8:37 AM   [place]%Wound Bed 0 3/18/2019  8:48 AM   Red%Wound Bed 95 4/24/2019  8:37 AM   Yellow%Wound Bed 5 4/24/2019  8:37 AM   Other%Wound Bed 0 4/18/2019  8:40 AM   Number of days: 142       Wound 12/31/18 #5 right lower leg medial ) since 1/2016)- venous/lymphedema (Active)   Wound Venous 1/30/2019  9:27 AM   Dressing Status Old drainage 3/25/2019  8:34 AM   Dressing/Treatment Collagen 4/24/2019  8:44 AM   Wound Cleansed Rinsed/Irrigated with saline 4/24/2019  8:37 AM   Wound Length (cm) 2.4 cm 4/24/2019  8:37 AM   Wound Width (cm) 3 cm 4/24/2019  8:37 AM   Wound Depth (cm) 0.1 cm 4/24/2019  8:37 AM   Wound Surface Area (cm^2) 7.2 cm^2 4/24/2019  8:37 AM   Change in Wound Size % (l*w) 62.32 4/24/2019  8:37 AM   Wound Volume (cm^3) 0.72 cm^3 4/24/2019  8:37 AM   Wound Healing % 62 4/24/2019  8:37 AM   Post-Procedure Length (cm) 2.5 cm 4/24/2019  8:44 AM   Post-Procedure Width (cm) 3.1 cm 4/24/2019  8:44 AM   Post-Procedure Depth (cm) 0.3 cm 4/24/2019  8:44 AM   Post-Procedure Surface Area (cm^2) 7.75 cm^2 4/24/2019  8:44 AM   Post-Procedure Volume (cm^3) 2.33 cm^3 4/24/2019  8:44 AM   Distance Tunneling (cm) 0 cm 4/18/2019  8:40 AM   Tunneling Position ___ O'Clock 0 1/21/2019 10:32 AM   Undermining Starts ___ O'Clock 0 2/18/2019 11:10 AM   Undermining Maxium Distance (cm) 0 4/18/2019  8:40 AM   Wound Assessment Red;Yellow 4/24/2019  8:37 AM Drainage Amount Small 4/24/2019  8:37 AM   Drainage Description Kenyon Baum 4/24/2019  8:37 AM   Odor None 4/24/2019  8:37 AM   Margins Defined edges 4/24/2019  8:37 AM   Kiah-wound Assessment Pink 4/24/2019  8:37 AM   Non-staged Wound Description Full thickness 4/24/2019  8:37 AM   [place]%Wound Bed 0 2/18/2019 11:10 AM   Red%Wound Bed 90 4/24/2019  8:37 AM   Yellow%Wound Bed 10 4/24/2019  8:37 AM   Black%Wound Bed 0 2/4/2019  8:49 AM   Other%Wound Bed 0 4/18/2019  8:40 AM   Number of days: 113          Percent of Wound Debrided: 100%    Total Surface Area Debrided:  3.6 sq cm    Diabetic/Pressure/Non Pressure Ulcers only:  Ulcer: Non-Pressure ulcer, fat layer exposed    Bleeding: Minimal    Hemostasis Achieved: by pressure    Procedural Pain: 0  / 10     Post Procedural Pain: 0 / 10     Response to treatment:  Well tolerated by patient. Wounds slowly re epithelializing. Continue with his own compression stocking to RLE and using lymphedema pumps        Plan:     Treatment Note: Please see attached Discharge Instructions. These instructions were given and signed by the patient or POA    New Medication(s) at this visit:   New Prescriptions    No medications on file       In my professional opinion this patient would benefit from HBO Therapy: No       Patient is to return to wound care center in:  1 week(s)    Discharge 200 [facility] Physician Orders and Discharge Instructions  [facility] [place] [address]  Telephone: [phone] hands with soap and water prior to and after every dressing change. Wound Cleansing:   · Do not scrub or use excessive force. · With each dressing change, rinse wounds with 0.9% Saline. (May use wound wash or soft contact solution. Both can be purchased at a local drug store). · If unable to obtain saline, may use a gentle soap and water.   · Keep wounds dry in the shower unless otherwise instructed by the physician. Topical Treatments:   may apply lotion to bilateral lower legs    Dressings:           Wound Location: right lower legs     [x] Apply Primary Dressing to wound:       [] Foam/Foam with Border(i.e Mepilex) [] Non-adherent (i.e.Mepitel)   [] Alginate with Silver (i.e. Silvercel)  [] Alginate (i.e. Aquacel)   [x] Collagen (i.e. Puracol): moisten   [] Collagen with Silver(i.e. Candice)     [] Hydrocolloid    [] Hydrafera Blue moistened with saline   [] MediHoney Paste/Gel   [] Hydrogel    [] Endoform      [] Santyl covered with gauze moisten with saline    [] Bactroban/Mupirocin   [] Polysporin/Neosporin  [] Other:      Pack wound loosely with: [] Iodoform   [] Plain Packing  [] Saline \"wet to dry\"      [x] Cover and Secure with:     [x] Dry Gauze  [] ABD [] Cast padding [x] Kerlix or Lyndsey rolled gauze   [] Coban  [] Ace Wrap [] Ace Wrap from forefoot to just below the knee [] Paper Tape [] Medipore Tape [] Other:    Avoid contact of tape with skin if possible. [x] Change dressing: [] Daily    [] Every Other Day  [x] Three times per week:  [x] Monday, Wednesday, Friday  [] Tuesday, Thursday, Saturday   [] Once a week  [] Do Not Change Dressing [] Other:        Pressure Relief:  · When sitting, shift position or do seat lifts every 15 minutes. · Turn every 2 hours when in bed. Avoid position directing pressure on wound site. · Limit side lying to 30 degree tilt. Limit HOB elevation to 30 degrees. Specialty equipment ordered:  []Wheelchair cushion [] Specialty Bed/Mattress       Edema Control:  Apply: [x] Compression Stocking  Bilateral lower leg(s)        Apply every morning immediately when getting up. They should be applied to affected leg(s) from mid foot to knee making sure to cover the heel. Remove every night before going to bed. [x] Elevate leg(s) above the level of the heart for 30 minutes 4-5 times a day and/or when sitting.     [x] Avoid prolonged standing in one place. Lymphedema Therapy:   [x] Wear Lymphedema pumps twice a day at settings prescribed by your physician. [x]Elevate leg(s) above the level of the heart for 30 minutes 4-5 times a day and/or when sitting. [x] Avoid prolonged standing in one place. Dietary:  Important dietary reminders:  1. Increase Protein intake (i.e. Lean meats, fish, eggs, legumes, and yogurt)  2. No added salt  3. If diabetic, follow a diabetic diet and check glucose prior to meals or as instructed by your physician. Return Appointment:   DME/Wound Dressing Supplies Provided by:   (Please call them directly to reorder supplies when you run out)   ECF or Home Healthcare:   Wound reassessment visit with Nurse :    Sheba Return Appointment: With Dr. Cayla Duval   in  [address] [place] [address]). Your nurse  is:  Yesenia     Electronically signed by Duke Simeon RN on 4/24/2019 at 8:57 AM     [facility] Information: Should you experience any significant changes in your wound(s) or have questions about your wound care, please contact the [address] [place] [address] at [phone] Monday-Friday from 8:00 am - 4:30 pm except for Wednesdays which hours are from 8:00 am - 2:00 pm.   If you need help with your wound outside these hours and cannot wait until we are again available, contact your PCP or go to the hospital emergency room. PLEASE NOTE: IF YOU ARE UNABLE TO OBTAIN WOUND SUPPLIES, CONTINUE TO USE THE SUPPLIES YOU HAVE AVAILABLE UNTIL YOU ARE ABLE TO REACH US. IT IS MOST IMPORTANT TO KEEP THE WOUND COVERED AT ALL TIMES.       [] Patient unable to sign Discharge Instructions given to ECF/Transportation/POA        Electronically signed by Caprice Lora MD on 4/24/2019 at 9:39 AM

## 2019-05-01 ENCOUNTER — ANTI-COAG VISIT (OUTPATIENT)
Dept: PHARMACY | Age: 54
End: 2019-05-01
Payer: MEDICAID

## 2019-05-01 ENCOUNTER — HOSPITAL ENCOUNTER (OUTPATIENT)
Dept: WOUND CARE | Age: 54
Discharge: HOME | End: 2019-05-01
Payer: MEDICAID

## 2019-05-01 VITALS
TEMPERATURE: 98 F | HEART RATE: 76 BPM | RESPIRATION RATE: 18 BRPM | DIASTOLIC BLOOD PRESSURE: 79 MMHG | SYSTOLIC BLOOD PRESSURE: 151 MMHG

## 2019-05-01 DIAGNOSIS — L97.919 IDIOPATHIC CHRONIC VENOUS HYPERTENSION OF RIGHT LOWER EXTREMITY WITH ULCER (HCC): ICD-10-CM

## 2019-05-01 DIAGNOSIS — I82.5Z9 CHRONIC VENOUS EMBOLISM AND THROMBOSIS OF DEEP VESSELS OF DISTAL LOWER EXTREMITY, UNSPECIFIED LATERALITY (HCC): ICD-10-CM

## 2019-05-01 DIAGNOSIS — I87.311 IDIOPATHIC CHRONIC VENOUS HYPERTENSION OF RIGHT LOWER EXTREMITY WITH ULCER (HCC): ICD-10-CM

## 2019-05-01 DIAGNOSIS — I89.0 CHRONIC ACQUIRED LYMPHEDEMA: Primary | ICD-10-CM

## 2019-05-01 DIAGNOSIS — Z79.01 CHRONIC ANTICOAGULATION: ICD-10-CM

## 2019-05-01 DIAGNOSIS — I82.220 THROMBOSIS OF INFERIOR VENA CAVA (HCC): ICD-10-CM

## 2019-05-01 LAB — INTERNATIONAL NORMALIZATION RATIO, POC: 2.5

## 2019-05-01 PROCEDURE — 11042 DBRDMT SUBQ TIS 1ST 20SQCM/<: CPT

## 2019-05-01 PROCEDURE — 85610 PROTHROMBIN TIME: CPT

## 2019-05-01 PROCEDURE — 11042 DBRDMT SUBQ TIS 1ST 20SQCM/<: CPT | Performed by: SPECIALIST

## 2019-05-01 PROCEDURE — 99211 OFF/OP EST MAY X REQ PHY/QHP: CPT

## 2019-05-01 PROCEDURE — 29581 APPL MULTLAYER CMPRN SYS LEG: CPT

## 2019-05-01 RX ORDER — LIDOCAINE HYDROCHLORIDE 40 MG/ML
2.5 SOLUTION TOPICAL ONCE
Status: DISCONTINUED | OUTPATIENT
Start: 2019-05-01 | End: 2019-05-02 | Stop reason: HOSPADM

## 2019-05-01 ASSESSMENT — PAIN DESCRIPTION - ORIENTATION: ORIENTATION: RIGHT

## 2019-05-01 ASSESSMENT — PAIN DESCRIPTION - LOCATION: LOCATION: LEG

## 2019-05-01 ASSESSMENT — PAIN DESCRIPTION - PAIN TYPE: TYPE: CHRONIC PAIN

## 2019-05-01 ASSESSMENT — PAIN SCALES - GENERAL: PAINLEVEL_OUTOF10: 2

## 2019-05-01 NOTE — PROGRESS NOTES
1227 [facility]  Progress Note and Procedure Note      Tresa Ram  MEDICAL RECORD NUMBER:  [id number]  AGE: 47 y.o. GENDER: male  : 1965  EPISODE DATE:  2019    Subjective:     Chief Complaint   Patient presents with    Wound Check     Right lower leg         HISTORY of PRESENT ILLNESS HPI     Tresa Ram is a 47 y.o. male who presents today for wound/ulcer evaluation. History of Wound Context: Justin Baeza is a 48 y. o. 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 Seen at [address] [place] [address] where unsuccessful dilatation of IVC was done. He has been grafted with compression wraps and lymphedema pumps with near complete healing. However, he has gone back to work and is on his feet causing partial loss of graft. He now attempted to wear his compression stockings but he states they are difficult to wear. He remains working 6 days a week and he on his feet. Numerous attempts to encourage lifestyle with work are unsuccessful. He is now wearing his compression stockings who presents today for wound/ulcer evaluation He has undergone two applications of Theraskin allograft. A recent DVT study LLE positive but patient refused admission for treatment. Now undergoing series of allograft applications with continued re epithelialization. He denies any pain or drainage   Await Dr. Antonino Sebastian input as far as potential perforators keeping from complete wound closure.         Pain Assessment:  Wound/Ulcer Pain Timing/Severity: none  Quality of pain: N/A  Severity:  0 / 10   Modifying Factors: None  Associated Signs/Symptoms: none    Ulcer Identification:  Ulcer Type: venous  Contributing Factors: edema, venous stasis, lymphedema and obesity    Objective:      BP (!) 151/79   Pulse 76   Temp 98 °F (36.7 °C) (Oral)   Resp 18     Wt Readings from Last 3 Encounters:   18 227 lb 6.4 oz (103.1 kg)   18 242 lb 8.1 oz (110 kg)   18 238 lb 1.6 oz (108 kg)       PHYSICAL EXAM    Extremities: no cyanosis, no clubbing, 3 + edema-  right with two fibrous wounds medial and posterior knee    Assessment:      No diagnosis found. Procedure Note  Indications:  Based on my examination of this patient's wound(s) today, sharp excision is required to promote healing and evaluate the extent healing. Performed by: Cassie Rogers MD    Consent obtained? Yes    Time out taken: Yes    Pain Control: Anesthetic: 4% Lidocaine Liquid Topical     Debridement:Excisional Debridement    Using curette the wound was sharply debrided    down through and including the removal of  epidermis, dermis and subcutaneous tissue.     Devitalized Tissue Debrided:  fibrin and slough      Pre Debridement Measurements:  Are located in the Wound Documentation Flow Sheet   Wound #: 5 and 6     Post  Debridement Measurements:  Wound 12/03/18 #6 right lower leg posterior (since 5/14/2018) (Active)   Wound Image   5/1/2019  9:17 AM   Wound Venous 3/4/2019  9:05 AM   Dressing Status Old drainage 4/1/2019  8:36 AM   Dressing/Treatment Other (comment) 5/1/2019 10:30 AM   Wound Cleansed Rinsed/Irrigated with saline 5/1/2019  9:17 AM   Wound Length (cm) 2.8 cm 5/1/2019  9:17 AM   Wound Width (cm) 1.7 cm 5/1/2019  9:17 AM   Wound Depth (cm) 0.1 cm 5/1/2019  9:17 AM   Wound Surface Area (cm^2) 4.76 cm^2 5/1/2019  9:17 AM   Change in Wound Size % (l*w) 6.67 5/1/2019  9:17 AM   Wound Volume (cm^3) 0.48 cm^3 5/1/2019  9:17 AM   Wound Healing % 6 5/1/2019  9:17 AM   Post-Procedure Length (cm) 2.9 cm 5/1/2019  9:50 AM   Post-Procedure Width (cm) 1.8 cm 5/1/2019  9:50 AM   Post-Procedure Depth (cm) 0.3 cm 5/1/2019  9:50 AM   Post-Procedure Surface Area (cm^2) 5.22 cm^2 5/1/2019  9:50 AM   Post-Procedure Volume (cm^3) 1.57 cm^3 5/1/2019  9:50 AM   Distance Tunneling (cm) 0 cm 5/1/2019  9:17 AM   Tunneling Position ___ O'Clock 0 1/28/2019  8:25 AM   Undermining Starts ___ O'Clock 0 3/29/2019  3:17 PM   Undermining Ends___ O'Clock 0 3/29/2019  3:17 PM   Undermining Maxium Distance (cm) 0 5/1/2019  9:17 AM   Wound Assessment Red;Pink;Slough; Yellow 5/1/2019  9:17 AM   Drainage Amount Small 5/1/2019  9:17 AM   Drainage Description Serosanguinous 5/1/2019  9:17 AM   Odor None 5/1/2019  9:17 AM   Margins Defined edges 5/1/2019  9:17 AM   Kiah-wound Assessment Dry;Pink 5/1/2019  9:17 AM   Non-staged Wound Description Full thickness 5/1/2019  9:17 AM   [place]%Wound Bed 20 5/1/2019  9:17 AM   Red%Wound Bed 70 5/1/2019  9:17 AM   Yellow%Wound Bed 10 5/1/2019  9:17 AM   Other%Wound Bed 0 4/18/2019  8:40 AM   Number of days: 149       Wound 12/31/18 #5 right lower leg medial ) since 1/2016)- venous/lymphedema (Active)   Wound Image   5/1/2019  9:17 AM   Wound Venous 1/30/2019  9:27 AM   Dressing Status Old drainage 3/25/2019  8:34 AM   Dressing/Treatment Other (comment) 5/1/2019 10:29 AM   Wound Cleansed Rinsed/Irrigated with saline 5/1/2019  9:17 AM   Wound Length (cm) 2.7 cm 5/1/2019  9:17 AM   Wound Width (cm) 3.5 cm 5/1/2019  9:17 AM   Wound Depth (cm) 0.1 cm 5/1/2019  9:17 AM   Wound Surface Area (cm^2) 9.45 cm^2 5/1/2019  9:17 AM   Change in Wound Size % (l*w) 50.55 5/1/2019  9:17 AM   Wound Volume (cm^3) 0.94 cm^3 5/1/2019  9:17 AM   Wound Healing % 51 5/1/2019  9:17 AM   Post-Procedure Length (cm) 2.8 cm 5/1/2019  9:50 AM   Post-Procedure Width (cm) 3.6 cm 5/1/2019  9:50 AM   Post-Procedure Depth (cm) 0.3 cm 5/1/2019  9:50 AM   Post-Procedure Surface Area (cm^2) 10.08 cm^2 5/1/2019  9:50 AM   Post-Procedure Volume (cm^3) 3.02 cm^3 5/1/2019  9:50 AM   Distance Tunneling (cm) 0 cm 5/1/2019  9:17 AM   Tunneling Position ___ O'Clock 0 1/21/2019 10:32 AM   Undermining Starts ___ O'Clock 0 2/18/2019 11:10 AM   Undermining Maxium Distance (cm) 0 5/1/2019  9:17 AM   Wound Assessment Red;Yellow 5/1/2019  9:17 AM   Drainage Amount Small 5/1/2019  9:17 AM   Drainage Description Serosanguinous 5/1/2019  9:17 AM   Odor None 5/1/2019  9:17 AM   Margins Defined edges 5/1/2019  9:17 AM   Kiah-wound Assessment Dry;Pink 5/1/2019  9:17 AM   Non-staged Wound Description Full thickness 5/1/2019  9:17 AM   [place]%Wound Bed 0 2/18/2019 11:10 AM   Red%Wound Bed 80 5/1/2019  9:17 AM   Yellow%Wound Bed 20 5/1/2019  9:17 AM   Black%Wound Bed 0 2/4/2019  8:49 AM   Other%Wound Bed 0 4/18/2019  8:40 AM   Number of days: 121          Percent of Wound Debrided: 100%    Total Surface Area Debrided:  15 sq cm    Diabetic/Pressure/Non Pressure Ulcers only:  Ulcer: Non-Pressure ulcer, limited to breakdown of skin    Bleeding: Minimal    Hemostasis Achieved: by pressure    Procedural Pain: 0  / 10     Post Procedural Pain: 0 / 10     Response to treatment:  Well tolerated by patient. Increased edema RLE when patient wearing his own compression stocking. Will place back in Profor 4 layer wrap. Wounds essentially unchanged. Await Dr Ayers First input whether there might be perforators that could be treated allowing closure of two remaining wounds. Plan:     Treatment Note: Please see attached Discharge Instructions. These instructions were given and signed by the patient or POA    New Medication(s) at this visit:   New Prescriptions    No medications on file       In my professional opinion this patient would benefit from HBO Therapy: No       Patient is to return to wound care center in:  1 week(s)    Discharge 200 [facility] Physician Orders and Discharge Instructions  [facility] [place] [address] [place] [address]  Telephone: [phone] hands with soap and water prior to and after every dressing change. Wound Cleansing:   · Do not scrub or use excessive force. · With each dressing change, rinse wounds with 0.9% Saline. (May use wound wash or soft contact solution. Both can be purchased at a local drug store). · If unable to obtain saline, may use a gentle soap and water.   · Keep wounds dry in the Compression Wrap      Applied in Clinic to the :  Right lower leg(s)     · Do not get leg(s) with wrap wet. · If wraps become too tight call the center or completely remove the wrap. · Elevate leg(s) above the level of the heart when sitting. · Avoid prolonged standing in one place. [] [facility] remove wrap, cleanse affected leg(s) with soap and water, apply wound dressings as ordered above and reapply compression wraps on:          Dietary:  Important dietary reminders:  1. Increase Protein intake (i.e. Lean meats, fish, eggs, legumes, and yogurt)  2. No added salt  3. If diabetic, follow a diabetic diet and check glucose prior to meals or as instructed by your physician. Return Appointment:   DME/Wound Dressing Supplies Provided by:   (Please call them directly to reorder supplies when you run out)   ECF or Home Healthcare:   Wound reassessment visit with Nurse :    Lindsay Chowdary Return Appointment: With Dr. Loreta Levine   in  [address] [place] [address]). Your nurse  is:  Yesenia     Electronically signed by Adonay Yan RN on 5/1/2019 at 9:52 AM     [facility] Information: Should you experience any significant changes in your wound(s) or have questions about your wound care, please contact the [address] [place] [address] at [phone] Monday-Friday from 8:00 am - 4:30 pm except for Wednesdays which hours are from 8:00 am - 2:00 pm.   If you need help with your wound outside these hours and cannot wait until we are again available, contact your PCP or go to the hospital emergency room. PLEASE NOTE: IF YOU ARE UNABLE TO OBTAIN WOUND SUPPLIES, CONTINUE TO USE THE SUPPLIES YOU HAVE AVAILABLE UNTIL YOU ARE ABLE TO REACH US. IT IS MOST IMPORTANT TO KEEP THE WOUND COVERED AT ALL TIMES.       [] Patient unable to sign Discharge Instructions given to ECF/Transportation/POA        Electronically signed by Gerold Cockayne, MD on 5/1/2019 at 11:55 AM

## 2019-05-01 NOTE — PROGRESS NOTES
Multilayer Compression Wrap   (Not Unna) Below the Knee    NAME:  Yessy Mak  YOB: 1965  MEDICAL RECORD NUMBER:  [id number]  DATE:  5/1/2019    Multilayer compression wrap: Applied moisturizing agent to dry skin as needed. Applied primary and secondary dressing as ordered. Applied multilayered dressing below the knee to right lower leg  Instructed patient/caregiver not to remove dressing and to keep it clean and dry. Instructed patient/caregiver on complications to report to provider, such as pain, numbness in toes, heavy drainage, and slippage of dressing. Instructed patient on purpose of compression dressing and on activity and exercise recommendations.     profore 4 layer wrap to right leg     Electronically signed by Adama Villatoro RN on 5/1/2019 at 9:55 AM

## 2019-05-01 NOTE — PROGRESS NOTES
Porsha Rose is a 47 y.o. male with PMHx significant for chronic DVT who presents to clinic 5/1/2019 for anticoagulation monitoring and adjustment. Patient has been taking warfarin for 15+ years.      Anticoagulation Indication(s):  DVT    Referring Physician:  Dr. Xuan Solomon  Goal INR Range:  2-3  Duration of Anticoagulation Therapy:  Indefinite  Time of day dose taken:  AM  Product patient has at home:  warfarin 5 mg (peach)    INR Summary                            Warfarin regimen (mg)  Date INR   A/P    Sun Mon Tue Wed Thu Fri Sat Mg/wk  5/1 2.5 At goal, no change  7.5 7.5 7.5 7.5 7.5 10 7.5 55  4/10 2.0 At goal, no change  7.5 7.5 7.5 7.5 7.5 10 7.5 55  3/13 2.6 At goal, no change  7.5 7.5 7.5 7.5 7.5 10 7.5 55  2/27 2.7 At goal, no change  7.5 7.5 7.5 7.5 7.5 10 7.5 55  2/20 2.9 At goal, no change  7.5 7.5 7.5 7.5 7.5 10 7.5 55  2/13 2.1 At goal, no change  7.5 7.5 7.5 7.5 7.5 10 7.5 55  2/8 1.8 Below goal, increase  7.5 7.5 7.5 7.5 7.5 10 7.5 55  2/4 2.2 At goal, no change  7.5 7.5 7.5 7.5 7.5 7.5 7.5 52.5  2/1 1.9 Below goal, bolus  7.5 7.5 7.5 7.5 7.5 10/7.5 7.5 52.5  1/30 1.8 Below goal, increase  7.5 7.5 7.5 7.5 7.5 7.5 7.5 52.5  1/28 1.8 Below goal, increase  7.5 7.5 7.5 5 7.5 7.5 5 47.5  1/25 1.6 Below goal, bolus + Lovenox 7.5 INR    7.5 7.5 TBD  1/18 1.8 Below goal, continue  7.5 5 7.5 5 7.5 5 5 42.5  1/4 2.1 At goal, no change  7.5 5 7.5 5 7.5 5 5 42.5    Last CBC:  Lab Results   Component Value Date    RBC 3.37 (L) 04/28/2018    HGB 8.3 (L) 04/28/2018    HCT 25.4 (L) 04/28/2018    MCV 75.4 (L) 04/28/2018    MCH 24.6 (L) 04/28/2018    MPV 6.8 04/28/2018    RDW 18.3 (H) 04/28/2018     04/28/2018     Patient History:  Recent hospitalizations/HC visits 1/4 bilateral LE venous duplex showed acute totally occluding DVT of the left SFV, popliteal, gastrocnemius, and posterior tibial veins   Recent medication changes -None reported today  -Cipro 500 mg BID 3/11-3/21 (completed)   Medications taken regularly that may interact with warfarin or alter INR Doxycycline   Warfarin dose taken as prescribed Yes - does not use pillbox (only takes two medications)   Signs/symptoms of bleeding No h/o bleeding reported   Vitamin K intake Normally avoids high vitamin K foods: ~0-1 serving per week   Recent vomiting/diarrhea/fever, changes in weight or activity level None reported   Tobacco or alcohol use Patient reports smoking 0 PPD  Patient reports having 0 drinks per day   Upcoming surgeries or procedures None reported     Assessment/Plan:  Patient's INR was therapeutic today (2.5). Patient denies any changes today. Patient continues to avoid high vitamin K foods. Patient was instructed to continue warfarin 7.5 mg daily, except 10 mg on Fridays. Repeat INR in 4 weeks. Patient was reminded to maintain consistent vitamin K intake and call with any bleeding, medication changes, or fever/vomiting/diarrhea. Prescription called to pharmacy:  Pharmacy:  [address] [place] [address] Phone:  [phone]   Warfarin strength:  5 mg Number of tablets:  50   Sig:  Take 7.5 mg daily, except 10 mg on Fridays or as directed by clinic Refills:  3   Physician:  Dr. Farida Sommer      Patient understands dosing directions and information discussed. Dosing schedule and follow up appointment given to patient. Progress note routed to referring physician's office. Patient acknowledges working in consult agreement with pharmacist as referred by his/her physician. Next Clinic Appointment:  5/29 (prefers appts at beginning or end of month if possible)    Please call [facility] at [phone] with any questions. Thanks! Harsha Preciado.  Beni Morillo, TenaD  [facility]  Ph: [phone]  5/1/2019 10:46 AM

## 2019-05-08 ENCOUNTER — HOSPITAL ENCOUNTER (OUTPATIENT)
Dept: WOUND CARE | Age: 54
Discharge: HOME | End: 2019-05-08
Payer: MEDICAID

## 2019-05-08 VITALS — TEMPERATURE: 98.2 F | DIASTOLIC BLOOD PRESSURE: 89 MMHG | RESPIRATION RATE: 16 BRPM | SYSTOLIC BLOOD PRESSURE: 149 MMHG

## 2019-05-08 DIAGNOSIS — R60.0 LOCALIZED EDEMA: Primary | ICD-10-CM

## 2019-05-08 DIAGNOSIS — I82.220 THROMBOSIS OF INFERIOR VENA CAVA (HCC): ICD-10-CM

## 2019-05-08 DIAGNOSIS — L97.919 IDIOPATHIC CHRONIC VENOUS HYPERTENSION OF RIGHT LOWER EXTREMITY WITH ULCER (HCC): ICD-10-CM

## 2019-05-08 DIAGNOSIS — Z79.01 CHRONIC ANTICOAGULATION: ICD-10-CM

## 2019-05-08 DIAGNOSIS — I87.311 IDIOPATHIC CHRONIC VENOUS HYPERTENSION OF RIGHT LOWER EXTREMITY WITH ULCER (HCC): ICD-10-CM

## 2019-05-08 PROCEDURE — 11042 DBRDMT SUBQ TIS 1ST 20SQCM/<: CPT | Performed by: SPECIALIST

## 2019-05-08 PROCEDURE — 29581 APPL MULTLAYER CMPRN SYS LEG: CPT

## 2019-05-08 PROCEDURE — 11042 DBRDMT SUBQ TIS 1ST 20SQCM/<: CPT

## 2019-05-08 PROCEDURE — 6370000000 HC RX 637 (ALT 250 FOR IP): Performed by: SPECIALIST

## 2019-05-08 RX ORDER — LIDOCAINE HYDROCHLORIDE 40 MG/ML
SOLUTION TOPICAL ONCE
Status: COMPLETED | OUTPATIENT
Start: 2019-05-08 | End: 2019-05-08

## 2019-05-08 RX ADMIN — LIDOCAINE HYDROCHLORIDE: 40 SOLUTION TOPICAL at 10:09

## 2019-05-08 ASSESSMENT — PAIN DESCRIPTION - ORIENTATION: ORIENTATION: RIGHT

## 2019-05-08 ASSESSMENT — PAIN DESCRIPTION - PAIN TYPE: TYPE: CHRONIC PAIN

## 2019-05-08 ASSESSMENT — PAIN SCALES - GENERAL: PAINLEVEL_OUTOF10: 2

## 2019-05-08 ASSESSMENT — PAIN DESCRIPTION - DESCRIPTORS: DESCRIPTORS: BURNING

## 2019-05-08 ASSESSMENT — PAIN DESCRIPTION - LOCATION: LOCATION: LEG

## 2019-05-08 NOTE — PROGRESS NOTES
Multilayer Compression Wrap   (Not Unna) Below the Knee    NAME:  Telma Sharp  YOB: 1965  MEDICAL RECORD NUMBER:  [id number]  DATE:  5/8/2019       [x] Removed old Multilayer wrap if present and washed leg with mild soap/water.  [x] Applied moisturizing agent to dry skin as needed.  [x] Applied primary and secondary dressing as ordered     [x] Applied multilayered dressing below the knee to Right lower leg(s)  (4 Layer Compression Wrap) per 's instructions.  [x] Instructed patient/caregiver not to remove dressing and to keep it clean and dry.  [x] Instructed patient/caregiver on complications to report to provider, such as pain, numbness in toes, heavy drainage, and slippage of dressing.  [x] Instructed patient on purpose of compression dressing and on activity and exercise recommendations.        Applied per   Guidelines    Electronically signed by Justine Ramirez RN on 5/8/2019 at 10:54 AM

## 2019-05-08 NOTE — PROGRESS NOTES
EXAM    Extremities: no cyanosis, no clubbing, 3 +nonpitting edema-  bilateral with fibrosis,wound posterior rt. Knee and medial rt. knee    Assessment:      No diagnosis found. Procedure Note  Indications:  Based on my examination of this patient's wound(s) today, sharp excision is required to promote healing and evaluate the extent healing. Performed by: Lauren Kraft MD    Consent obtained? Yes    Time out taken: Yes    Pain Control: Anesthetic: 4% Lidocaine Liquid Topical     Debridement:Excisional Debridement    Using curette the wound was sharply debrided    down through and including the removal of  epidermis, dermis and subcutaneous tissue.     Devitalized Tissue Debrided:  fibrin and biofilm      Pre Debridement Measurements:  Are located in the Wound Documentation Flow Sheet   Wound #: 5 and 6     Post  Debridement Measurements:  Wound 12/03/18 #6 right lower leg posterior (since 5/14/2018) (Active)   Wound Image   5/1/2019  9:17 AM   Wound Venous 3/4/2019  9:05 AM   Dressing Status Old drainage 4/1/2019  8:36 AM   Dressing/Treatment Other (comment) 5/8/2019 10:50 AM   Wound Cleansed Rinsed/Irrigated with saline 5/8/2019  9:55 AM   Wound Length (cm) 2.5 cm 5/8/2019  9:55 AM   Wound Width (cm) 1.7 cm 5/8/2019  9:55 AM   Wound Depth (cm) 0.1 cm 5/8/2019  9:55 AM   Wound Surface Area (cm^2) 4.25 cm^2 5/8/2019  9:55 AM   Change in Wound Size % (l*w) 16.67 5/8/2019  9:55 AM   Wound Volume (cm^3) 0.42 cm^3 5/8/2019  9:55 AM   Wound Healing % 18 5/8/2019  9:55 AM   Post-Procedure Length (cm) 2.6 cm 5/8/2019 10:50 AM   Post-Procedure Width (cm) 1.8 cm 5/8/2019 10:50 AM   Post-Procedure Depth (cm) 0.3 cm 5/8/2019 10:50 AM   Post-Procedure Surface Area (cm^2) 4.68 cm^2 5/8/2019 10:50 AM   Post-Procedure Volume (cm^3) 1.4 cm^3 5/8/2019 10:50 AM   Distance Tunneling (cm) 0 cm 5/8/2019  9:55 AM   Tunneling Position ___ O'Clock 0 1/28/2019  8:25 AM   Undermining Starts ___ O'Clock 0 3/29/2019  3:17 PM Undermining Ends___ O'Clock 0 3/29/2019  3:17 PM   Undermining Maxium Distance (cm) 0 5/8/2019  9:55 AM   Wound Assessment Pink;Yellow 5/8/2019  9:55 AM   Drainage Amount Moderate 5/8/2019  9:55 AM   Drainage Description Serosanguinous 5/8/2019  9:55 AM   Odor None 5/8/2019  9:55 AM   Margins Defined edges 5/8/2019  9:55 AM   Kiah-wound Assessment Dry;Pink 5/8/2019  9:55 AM   Non-staged Wound Description Full thickness 5/8/2019  9:55 AM   [place]%Wound Bed 40 5/8/2019  9:55 AM   Red%Wound Bed 0 5/8/2019  9:55 AM   Yellow%Wound Bed 60 5/8/2019  9:55 AM   Other%Wound Bed 0 4/18/2019  8:40 AM   Number of days: 156       Wound 12/31/18 #5 right lower leg medial ) since 1/2016)- venous/lymphedema (Active)   Wound Image   5/1/2019  9:17 AM   Wound Venous 1/30/2019  9:27 AM   Dressing Status Old drainage 3/25/2019  8:34 AM   Dressing/Treatment Other (comment) 5/8/2019 10:50 AM   Wound Cleansed Rinsed/Irrigated with saline 5/8/2019  9:55 AM   Wound Length (cm) 2.5 cm 5/8/2019  9:55 AM   Wound Width (cm) 3.5 cm 5/8/2019  9:55 AM   Wound Depth (cm) 0.1 cm 5/8/2019  9:55 AM   Wound Surface Area (cm^2) 8.75 cm^2 5/8/2019  9:55 AM   Change in Wound Size % (l*w) 54.21 5/8/2019  9:55 AM   Wound Volume (cm^3) 0.88 cm^3 5/8/2019  9:55 AM   Wound Healing % 54 5/8/2019  9:55 AM   Post-Procedure Length (cm) 2.6 cm 5/8/2019 10:50 AM   Post-Procedure Width (cm) 3.6 cm 5/8/2019 10:50 AM   Post-Procedure Depth (cm) 0.3 cm 5/8/2019 10:50 AM   Post-Procedure Surface Area (cm^2) 9.36 cm^2 5/8/2019 10:50 AM   Post-Procedure Volume (cm^3) 2.81 cm^3 5/8/2019 10:50 AM   Distance Tunneling (cm) 0 cm 5/8/2019  9:55 AM   Tunneling Position ___ O'Clock 0 1/21/2019 10:32 AM   Undermining Starts ___ O'Clock 0 2/18/2019 11:10 AM   Undermining Maxium Distance (cm) 0 5/8/2019  9:55 AM   Wound Assessment Red;Yellow 5/8/2019  9:55 AM   Drainage Amount Moderate 5/8/2019  9:55 AM   Drainage Description Serosanguinous 5/8/2019  9:55 AM   Odor None 5/8/2019 9:55 AM   Margins Defined edges 5/8/2019  9:55 AM   Kiah-wound Assessment Dry;Pink 5/8/2019  9:55 AM   Non-staged Wound Description Full thickness 5/8/2019  9:55 AM   [place]%Wound Bed 0 2/18/2019 11:10 AM   Red%Wound Bed 70 5/8/2019  9:55 AM   Yellow%Wound Bed 30 5/8/2019  9:55 AM   Black%Wound Bed 0 2/4/2019  8:49 AM   Other%Wound Bed 0 4/18/2019  8:40 AM   Number of days: 128          Percent of Wound Debrided: 100%    Total Surface Area Debrided:  13 sq cm    Diabetic/Pressure/Non Pressure Ulcers only:  Ulcer: Non-Pressure ulcer, fat layer exposed    Bleeding: Minimal    Hemostasis Achieved: by pressure    Procedural Pain: 0  / 10     Post Procedural Pain: 0 / 10     Response to treatment:  Well tolerated by patient. Plan:     Treatment Note: Please see attached Discharge Instructions. These instructions were given and signed by the patient or POA    New Medication(s) at this visit:   New Prescriptions    No medications on file       In my professional opinion this patient would benefit from HBO Therapy: No       Patient is to return to wound care center in:  1 week(s)    Discharge [address] [facility] Physician Orders and Discharge Instructions  [facility] [place] [address]  Telephone: [phone] hands with soap and water prior to and after every dressing change. Wound Cleansing: vashe wash soaked gauze to wound beds for 5 minutes with each dressing  · Do not scrub or use excessive force. · With each dressing change, rinse wounds with 0.9% Saline. (May use wound wash or soft contact solution. Both can be purchased at a local drug store). · If unable to obtain saline, may use a gentle soap and water. · Keep wounds dry in the shower unless otherwise instructed by the physician.     Topical Treatments:  Do not apply lotions, creams, or ointments to wound bed unless directed as followed:     [] Apply moisturizing lotion to skin surrounding the wound prior to dressing change  [] Apply antifungal ointment to skin surrounding the wound prior to dressing change  [] Apply thin film of moisture barrier ointment to skin immediately around wound. [] Other:     Dressings:           Wound Location: right lower leg wounds     [x] Apply Primary Dressing to wound:       [] Foam/Foam with Border(i.e Mepilex) [] Non-adherent (i.e.Mepitel)   [] Alginate with Silver (i.e. Silvercel)  [] Alginate (i.e. Aquacel)   [] Collagen (i.e. Puracol)   [] Collagen with Silver(i.e. Candice)     [] Hydrocolloid    [] Hydrafera Blue moistened with saline   [] MediHoney Paste/Gel   [] Hydrogel    [x] Endoform, covered with hydrofera blue     [] Santyl covered with gauze moisten with saline     [] Betadine   [] Bactroban/Mupirocin   [] Polysporin/Neosporin  [] Other:    [] Saline \"wet to dry\" gauze     Pack wound loosely with: [] Iodoform   [] Plain Packing  [] Other:      [x] Cover and Secure with:     [x] Dry Gauze  [] ABD [] Cast padding [] Kerlix or Lyndsey rolled gauze   [] Coban  [] Ace Wrap [] Ace Wrap from forefoot to just below the knee [] Paper Tape [] Medipore Tape [] Other:    Avoid contact of tape with skin if possible. [x] Change dressing: [] Daily    [] Every Other Day  [] Three times per week:  [] Monday, Wednesday, Friday  [] Tuesday, Thursday, Saturday   [] Once a week  [x] Do Not Change Dressing [] Other:         Edema Control:  Apply: [x] Compression Stocking  [x] Left Lower Leg [] Right Lower Leg     Apply every morning immediately when getting up. They should be applied to affected leg(s) from mid foot to knee making sure to cover the heel. Remove every night before going to bed. [x] Elevate leg(s) above the level of the heart for 30 minutes 4-5 times a day and/or when sitting. [x] Avoid prolonged standing in one place.      Lymphedema Therapy:   [x] Wear Lymphedema pumps twice a day at settings prescribed by your

## 2019-05-15 ENCOUNTER — HOSPITAL ENCOUNTER (OUTPATIENT)
Dept: WOUND CARE | Age: 54
Discharge: HOME | End: 2019-05-15
Payer: MEDICAID

## 2019-05-15 VITALS
DIASTOLIC BLOOD PRESSURE: 76 MMHG | TEMPERATURE: 97.8 F | SYSTOLIC BLOOD PRESSURE: 140 MMHG | RESPIRATION RATE: 16 BRPM | HEART RATE: 72 BPM

## 2019-05-15 DIAGNOSIS — Z79.01 CHRONIC ANTICOAGULATION: ICD-10-CM

## 2019-05-15 DIAGNOSIS — I82.220 THROMBOSIS OF INFERIOR VENA CAVA (HCC): Primary | ICD-10-CM

## 2019-05-15 DIAGNOSIS — L97.919 IDIOPATHIC CHRONIC VENOUS HYPERTENSION OF RIGHT LOWER EXTREMITY WITH ULCER (HCC): ICD-10-CM

## 2019-05-15 DIAGNOSIS — I87.311 IDIOPATHIC CHRONIC VENOUS HYPERTENSION OF RIGHT LOWER EXTREMITY WITH ULCER (HCC): ICD-10-CM

## 2019-05-15 DIAGNOSIS — I89.0 CHRONIC ACQUIRED LYMPHEDEMA: ICD-10-CM

## 2019-05-15 PROCEDURE — 6370000000 HC RX 637 (ALT 250 FOR IP): Performed by: SPECIALIST

## 2019-05-15 PROCEDURE — 11042 DBRDMT SUBQ TIS 1ST 20SQCM/<: CPT | Performed by: SPECIALIST

## 2019-05-15 PROCEDURE — 29581 APPL MULTLAYER CMPRN SYS LEG: CPT

## 2019-05-15 PROCEDURE — 11042 DBRDMT SUBQ TIS 1ST 20SQCM/<: CPT

## 2019-05-15 RX ORDER — LIDOCAINE HYDROCHLORIDE 40 MG/ML
2.5 SOLUTION TOPICAL ONCE
Status: COMPLETED | OUTPATIENT
Start: 2019-05-15 | End: 2019-05-15

## 2019-05-15 RX ADMIN — LIDOCAINE HYDROCHLORIDE 2.5 ML: 40 SOLUTION TOPICAL at 09:10

## 2019-05-15 ASSESSMENT — PAIN DESCRIPTION - LOCATION: LOCATION: LEG

## 2019-05-15 ASSESSMENT — PAIN SCALES - GENERAL: PAINLEVEL_OUTOF10: 1

## 2019-05-15 ASSESSMENT — PAIN DESCRIPTION - ORIENTATION: ORIENTATION: RIGHT

## 2019-05-15 ASSESSMENT — PAIN DESCRIPTION - DESCRIPTORS: DESCRIPTORS: BURNING

## 2019-05-15 ASSESSMENT — PAIN DESCRIPTION - PAIN TYPE: TYPE: ACUTE PAIN

## 2019-05-15 NOTE — PROGRESS NOTES
Multilayer Compression Wrap   (Not Unna) Below the Knee    NAME:  Jolie Prince  YOB: 1965  MEDICAL RECORD NUMBER:  [id number]  DATE:  5/15/2019       [x] Removed old Multilayer wrap if present and washed leg with mild soap/water.  [x] Applied moisturizing agent to dry skin as needed.  [x] Applied primary and secondary dressing as ordered     [x] Applied multilayered dressing below the knee to Right lower leg(s)  (4 Layer Compression Wrap) per 's instructions.  [x] Instructed patient/caregiver not to remove dressing and to keep it clean and dry.  [x] Instructed patient/caregiver on complications to report to provider, such as pain, numbness in toes, heavy drainage, and slippage of dressing.  [x] Instructed patient on purpose of compression dressing and on activity and exercise recommendations.        Applied per   Guidelines    Electronically signed by Ishmael Rosas RN on 5/15/2019 at 9:47 AM

## 2019-05-15 NOTE — PROGRESS NOTES
PHYSICAL EXAM    Extremities: no cyanosis, no clubbing, 3 +nonpitting edema-  bilateral varicosities and 2 wounds rt. knee    Assessment:      No diagnosis found. Procedure Note  Indications:  Based on my examination of this patient's wound(s) today, sharp excision is required to promote healing and evaluate the extent healing. Performed by: Babita Lema MD    Consent obtained? Yes    Time out taken: Yes    Pain Control: Anesthetic: 4% Lidocaine Liquid Topical     Debridement:Excisional Debridement    Using curette the wound was sharply debrided    down through and including the removal of  epidermis, dermis and subcutaneous tissue.     Devitalized Tissue Debrided:  fibrin and biofilm      Pre Debridement Measurements:  Are located in the Wound Documentation Flow Sheet   Wound #: 5 and 6     Post  Debridement Measurements:  Wound 12/03/18 #6 right lower leg posterior (since 5/14/2018) (Active)   Wound Image   5/1/2019  9:17 AM   Wound Venous 3/4/2019  9:05 AM   Dressing Status Old drainage 4/1/2019  8:36 AM   Dressing/Treatment Other (comment) 5/15/2019  9:44 AM   Wound Cleansed Rinsed/Irrigated with saline 5/15/2019  8:56 AM   Wound Length (cm) 2.3 cm 5/15/2019  8:56 AM   Wound Width (cm) 1.6 cm 5/15/2019  8:56 AM   Wound Depth (cm) 0.1 cm 5/15/2019  8:56 AM   Wound Surface Area (cm^2) 3.68 cm^2 5/15/2019  8:56 AM   Change in Wound Size % (l*w) 27.84 5/15/2019  8:56 AM   Wound Volume (cm^3) 0.37 cm^3 5/15/2019  8:56 AM   Wound Healing % 27 5/15/2019  8:56 AM   Post-Procedure Length (cm) 2.4 cm 5/15/2019  9:24 AM   Post-Procedure Width (cm) 1.7 cm 5/15/2019  9:24 AM   Post-Procedure Depth (cm) 0.3 cm 5/15/2019  9:24 AM   Post-Procedure Surface Area (cm^2) 4.08 cm^2 5/15/2019  9:24 AM   Post-Procedure Volume (cm^3) 1.22 cm^3 5/15/2019  9:24 AM   Distance Tunneling (cm) 0 cm 5/15/2019  8:56 AM   Tunneling Position ___ O'Clock 0 1/28/2019  8:25 AM   Undermining Starts ___ O'Clock 0 3/29/2019  3:17 PM 8:56 AM   Odor None 5/15/2019  8:56 AM   Margins Defined edges 5/15/2019  8:56 AM   Kiah-wound Assessment Pink; White; Maceration 5/15/2019  8:56 AM   Non-staged Wound Description Full thickness 5/15/2019  8:56 AM   [place]%Wound Bed 0 2/18/2019 11:10 AM   Red%Wound Bed 70 5/15/2019  8:56 AM   Yellow%Wound Bed 30 5/15/2019  8:56 AM   Black%Wound Bed 0 2/4/2019  8:49 AM   Other%Wound Bed 0 4/18/2019  8:40 AM   Number of days: 134          Percent of Wound Debrided: 100%    Total Surface Area Debrided:  13 sq cm    Diabetic/Pressure/Non Pressure Ulcers only:  Ulcer: Non-Pressure ulcer, fat layer exposed    Bleeding: Minimal    Hemostasis Achieved: by pressure    Procedural Pain: 0  / 10     Post Procedural Pain: 0 / 10     Response to treatment:  Well tolerated by patient. Minimal improvement in wound measurements. He is to see Dr. Kevni Officer tomorrow        Plan:     Treatment Note: Please see attached Discharge Instructions. These instructions were given and signed by the patient or POA    New Medication(s) at this visit:   New Prescriptions    No medications on file       In my professional opinion this patient would benefit from HBO Therapy: No       Patient is to return to wound care center in:  1 week(s)    Discharge [address] [facility] Physician Orders and Discharge Instructions  [facility] [place] [address]  Telephone: [phone] hands with soap and water prior to and after every dressing change. Wound Cleansing:  vashe wash soaked gauze on wound bed for 5 minutes with each dressing change. · Do not scrub or use excessive force. · With each dressing change, rinse wounds with 0.9% Saline. (May use wound wash or soft contact solution. Both can be purchased at a local drug store). · If unable to obtain saline, may use a gentle soap and water.   · Keep wounds dry in the shower unless otherwise instructed by the physician. Topical Treatments:  Do not apply lotions, creams, or ointments to wound bed unless directed as followed:     [] Apply moisturizing lotion to skin surrounding the wound prior to dressing change  [] Apply antifungal ointment to skin surrounding the wound prior to dressing change  [] Apply thin film of moisture barrier ointment to skin immediately around wound. [] Other:       Dressings:           Wound Location: right lower leg wounds      [x] Apply Primary Dressing to wound:       [] Foam/Foam with Border(i.e Mepilex) [] Non-adherent (i.e.Mepitel)   [] Alginate with Silver (i.e. Silvercel)  [] Alginate (i.e. Aquacel)   [] Collagen (i.e. Puracol)   [] Collagen with Silver(i.e. Candice)     [] Hydrocolloid    [x] Hydrafera Blue moistened with saline   [] MediHoney Paste/Gel   [] Hydrogel    [x] Endoform      [] Santyl covered with gauze moisten with saline      [] Betadine   [] Bactroban/Mupirocin   [] Polysporin/Neosporin  [] Other:    [] Saline \"wet to dry\" gauzec     Pack wound loosely with: [] Iodoform   [] Plain Packing  [] Saline \"wet to dry\"      [x] Cover and Secure with:     [x] Dry Gauze  [] ABD  [] Cast padding  [] Kerlix or Lyndsey rolled gauze   [] Coban  [] Ace Wrap [] Ace Wrap from forefoot to just below the knee  [] Paper Tape [] Medipore Tape [] Other:    Avoid contact of tape with skin if possible. [x] Change dressing: [] Daily    [] Every Other Day  [] Three times per week:  [] Monday, Wednesday, Friday  [] Tuesday, Thursday, Saturday   [] Once a week  [x] Do Not Change Dressing [] Other:            Edema Control:  Apply: [x] Compression Stocking  [x] Left Lower Leg [] Right Lower Leg       Apply every morning immediately when getting up. They should be applied to affected leg(s) from mid foot to knee making sure to cover the heel. Remove every night before going to bed. [x] Elevate leg(s) above the level of the heart for 30 minutes 4-5 times a day and/or when sitting.     [x] Avoid prolonged standing in one place. Lymphedema Therapy:   [x] Wear Lymphedema pumps twice a day at settings prescribed by your physician. [x]Elevate leg(s) above the level of the heart for 30 minutes 4-5 times a day and/or when sitting. [x] Avoid prolonged standing in one place. Multilayer Compression Wrap:  Type: 4 Layer Compression Wrap      Applied in Clinic to the :  Right lower leg(s)     · Do not get leg(s) with wrap wet. · If wraps become too tight call the center or completely remove the wrap. · Elevate leg(s) above the level of the heart when sitting. · Avoid prolonged standing in one place. [] [facility] remove wrap, cleanse affected leg(s) with soap and water, apply wound dressings as ordered above and reapply compression wraps on:          Dietary:  Important dietary reminders:  1. Increase Protein intake (i.e. Lean meats, fish, eggs, legumes, and yogurt)  2. No added salt  3. If diabetic, follow a diabetic diet and check glucose prior to meals or as instructed by your physician. Return Appointment:   DME/Wound Dressing Supplies Provided by:   (Please call them directly to reorder supplies when you run out)   ECF or Home Healthcare:   Wound reassessment visit with Nurse :    Tana Ken Return Appointment: With Dr. Tiffani Damon   On 05/20/19      [x] Orders placed during your visit: No orders of the defined types were placed in this encounter.         Your nurse  is:  Yesenia     Electronically signed by Ken Pelaez RN on 5/15/2019 at [address] [place] [address]  Information: Should you experience any significant changes in your wound(s) or have questions about your wound care, please contact the [address] [place] [address] at [phone] Monday-Friday from 8:00 am - 4:30 pm except for Wednesdays which hours are from 8:00 am - 2:00 pm.   If you need help with your wound outside these hours and cannot wait until we are again available, contact your PCP or go to the hospital emergency room. PLEASE NOTE: IF YOU ARE UNABLE TO OBTAIN WOUND SUPPLIES, CONTINUE TO USE THE SUPPLIES YOU HAVE AVAILABLE UNTIL YOU ARE ABLE TO REACH US. IT IS MOST IMPORTANT TO KEEP THE WOUND COVERED AT ALL TIMES.       [] Patient unable to sign Discharge Instructions given to ECF/Transportation/POA        Electronically signed by Ilene Sellers MD on 5/15/2019 at 10:26 AM

## 2019-05-15 NOTE — DISCHARGE INSTR - COC
Lower leg DVT (deep venous thromboembolism), acute ([facility]) I82.4Z9       Isolation/Infection:   Isolation          No Isolation            Nurse Assessment:  Last Vital Signs: BP (!) 140/76   Pulse 72   Temp 97.8 °F (36.6 °C) (Oral)   Resp 16     Last documented pain score (0-10 scale): Pain Level: 1  Last Weight:   Wt Readings from Last 1 Encounters:   12/14/18 227 lb 6.4 oz (103.1 kg)     Mental Status:  {IP PT MENTAL STATUS:75332}    IV Access:  [facility] IV ACCESS:128350147}    Nursing Mobility/ADLs:  Walking   {P DME PGIN:564839844}  Transfer  {[facility] DME PFUU:913318912}  Bathing  {[facility] DME BUHO:782036478}  Dressing  {P DME DGOF:768471199}  Toileting  {P DME EVQY:403719716}  Feeding  {[facility] DME CNDI:497092603}  Med Admin  {[facility] DME NAML:272549920}  Med Delivery   {[facility] MED Delivery:218805135}    Wound Care Documentation and Therapy:  Wound 12/03/18 #6 right lower leg posterior (since 5/14/2018) (Active)   Wound Image   5/1/2019  9:17 AM   Wound Venous 3/4/2019  9:05 AM   Dressing Status Old drainage 4/1/2019  8:36 AM   Dressing/Treatment Other (comment) 5/8/2019 10:50 AM   Wound Cleansed Rinsed/Irrigated with saline 5/15/2019  8:56 AM   Wound Length (cm) 2.3 cm 5/15/2019  8:56 AM   Wound Width (cm) 1.6 cm 5/15/2019  8:56 AM   Wound Depth (cm) 0.1 cm 5/15/2019  8:56 AM   Wound Surface Area (cm^2) 3.68 cm^2 5/15/2019  8:56 AM   Change in Wound Size % (l*w) 27.84 5/15/2019  8:56 AM   Wound Volume (cm^3) 0.37 cm^3 5/15/2019  8:56 AM   Wound Healing % 27 5/15/2019  8:56 AM   Post-Procedure Length (cm) 2.4 cm 5/15/2019  9:24 AM   Post-Procedure Width (cm) 1.7 cm 5/15/2019  9:24 AM   Post-Procedure Depth (cm) 0.3 cm 5/15/2019  9:24 AM   Post-Procedure Surface Area (cm^2) 4.08 cm^2 5/15/2019  9:24 AM   Post-Procedure Volume (cm^3) 1.22 cm^3 5/15/2019  9:24 AM   Distance Tunneling (cm) 0 cm 5/15/2019  8:56 AM   Tunneling Position ___ O'Clock 0 1/28/2019  8:25 AM   Undermining Starts ___ O'Clock 0 3/29/2019  3:17 PM Undermining Ends___ O'Clock 0 3/29/2019  3:17 PM   Undermining Maxium Distance (cm) 0 5/15/2019  8:56 AM   Wound Assessment Pink;Yellow; Red 5/15/2019  8:56 AM   Drainage Amount Moderate 5/15/2019  8:56 AM   Drainage Description Serosanguinous 5/15/2019  8:56 AM   Odor None 5/15/2019  8:56 AM   Margins Defined edges 5/15/2019  8:56 AM   Kiah-wound Assessment Dry;Pink 5/15/2019  8:56 AM   Non-staged Wound Description Full thickness 5/15/2019  8:56 AM   [place]%Wound Bed 25 5/15/2019  8:56 AM   Red%Wound Bed 25 5/15/2019  8:56 AM   Yellow%Wound Bed 50 5/15/2019  8:56 AM   Other%Wound Bed 0 4/18/2019  8:40 AM   Number of days: 162       Wound 12/31/18 #5 right lower leg medial ) since 1/2016)- venous/lymphedema (Active)   Wound Image   5/1/2019  9:17 AM   Wound Venous 1/30/2019  9:27 AM   Dressing Status Old drainage 3/25/2019  8:34 AM   Dressing/Treatment Other (comment) 5/8/2019 10:50 AM   Wound Cleansed Rinsed/Irrigated with saline 5/15/2019  8:56 AM   Wound Length (cm) 2.5 cm 5/15/2019  8:56 AM   Wound Width (cm) 3.4 cm 5/15/2019  8:56 AM   Wound Depth (cm) 0.1 cm 5/15/2019  8:56 AM   Wound Surface Area (cm^2) 8.5 cm^2 5/15/2019  8:56 AM   Change in Wound Size % (l*w) 55.52 5/15/2019  8:56 AM   Wound Volume (cm^3) 0.85 cm^3 5/15/2019  8:56 AM   Wound Healing % 55 5/15/2019  8:56 AM   Post-Procedure Length (cm) 2.6 cm 5/15/2019  9:24 AM   Post-Procedure Width (cm) 3.5 cm 5/15/2019  9:24 AM   Post-Procedure Depth (cm) 0.3 cm 5/15/2019  9:24 AM   Post-Procedure Surface Area (cm^2) 9.1 cm^2 5/15/2019  9:24 AM   Post-Procedure Volume (cm^3) 2.73 cm^3 5/15/2019  9:24 AM   Distance Tunneling (cm) 0 cm 5/15/2019  8:56 AM   Tunneling Position ___ O'Clock 0 1/21/2019 10:32 AM   Undermining Starts ___ O'Clock 0 2/18/2019 11:10 AM   Undermining Maxium Distance (cm) 0 5/15/2019  8:56 AM   Wound Assessment Red;Yellow 5/15/2019  8:56 AM   Drainage Amount Moderate 5/15/2019  8:56 AM   Drainage Description Serosanguinous 5/15/2019 0           Discharging to Facility/ Agency   · Name:   · Address:  · Phone:  · Fax:    Dialysis Facility (if applicable)   · Name:  · Address:  · Dialysis Schedule:  · Phone:  · Fax:    / signature: {Esignature:[id number]}    PHYSICIAN SECTION    Prognosis: {Prognosis:8118591953}    Condition at Discharge: Jos Johnson Patient Condition:956450772}    Rehab Potential (if transferring to Rehab): {Prognosis:5859370456}    Recommended Labs or Other Treatments After Discharge: ***    Physician Certification: I certify the above information and transfer of Marquis Noriega  is necessary for the continuing treatment of the diagnosis listed and that he requires {Admit to Appropriate Level of Care:69412} for {GREATER/LESS:819289685} 30 days.      Update Admission H&P: {CHP DME Changes in MVPEJ:700079557}    PHYSICIAN SIGNATURE:  {Esignature:964030571}

## 2019-05-16 ENCOUNTER — TELEPHONE (OUTPATIENT)
Dept: WOUND CARE | Age: 54
End: 2019-05-16

## 2019-05-16 ENCOUNTER — OFFICE VISIT (OUTPATIENT)
Dept: VASCULAR SURGERY | Age: 54
End: 2019-05-16
Payer: MEDICAID

## 2019-05-16 VITALS
DIASTOLIC BLOOD PRESSURE: 68 MMHG | SYSTOLIC BLOOD PRESSURE: 136 MMHG | BODY MASS INDEX: 39.32 KG/M2 | WEIGHT: 236 LBS | HEIGHT: 65 IN

## 2019-05-16 DIAGNOSIS — L97.909 VENOUS ULCER (HCC): Primary | ICD-10-CM

## 2019-05-16 DIAGNOSIS — I83.009 VENOUS ULCER (HCC): Primary | ICD-10-CM

## 2019-05-16 PROCEDURE — 1036F TOBACCO NON-USER: CPT | Performed by: SURGERY

## 2019-05-16 PROCEDURE — G8598 ASA/ANTIPLAT THER USED: HCPCS | Performed by: SURGERY

## 2019-05-16 PROCEDURE — G8427 DOCREV CUR MEDS BY ELIG CLIN: HCPCS | Performed by: SURGERY

## 2019-05-16 PROCEDURE — 3017F COLORECTAL CA SCREEN DOC REV: CPT | Performed by: SURGERY

## 2019-05-16 PROCEDURE — G8417 CALC BMI ABV UP PARAM F/U: HCPCS | Performed by: SURGERY

## 2019-05-16 PROCEDURE — 99214 OFFICE O/P EST MOD 30 MIN: CPT | Performed by: SURGERY

## 2019-05-16 NOTE — PROGRESS NOTES
Subjective:      Patient ID: Marquis Noriega is a 47 y.o. male. HPI Referral from Holly Quiñones MD from [facility] for repeat evaluation and suggestions re: chronic nonhealing venous ulceration R calf. Since seen in the past went to F and had attempt at recannulation of chronic venous occlusions but no stents placed. F/U CT here demonstrated reocclusion. Aggressive care [address] has led to near complete healing of R leg with 2 areas that remain open. Uses stockings for L leg and lymphapress bilaterally. 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 R leg wrapped weekly now. Continues to be very active working full time. Past Medical History:   Diagnosis Date    DVT (deep venous thrombosis) (HCC)     Hx of blood clots     Pain and swelling of right lower leg      Past Surgical History:   Procedure Laterality Date    BALLOON ANGIOPLASTY, ARTERY Right 12/19/2017    at [facility]. Right      No Known Allergies  Current Outpatient Medications   Medication Sig Dispense Refill    doxycycline hyclate (VIBRAMYCIN) 100 MG capsule Take 100 mg by mouth 2 times daily      warfarin (COUMADIN) 5 MG tablet Take 10 mg on Fridays; take 7.5 mg on all other days OR as directed by clinic. 145 tablet 1    Compression Stockings MISC by Does not apply route 40-50mmHG bilateral knee high, closed toe, custom fitted zipper stockings    ICD 10- I87.311, L97.919 1 each 3    Handicap Placard MISC by Does not apply route Dx Lower loeg DVT chronic, bilateral. Effective 2/12/2018 until 2/12/2021. 1 each 0     No current facility-administered medications for this visit.       Social History     Socioeconomic History    Marital status:      Spouse name: Not on file    Number of children: Not on file    Years of education: Not on file    Highest education level: Not on file   Occupational History    Not on file   Social Needs    Financial resource strain: Not on file   Konstantin-Zev insecurity:     Worry: Not on file     Inability: Not on file    Transportation needs:     Medical: Not on file     Non-medical: Not on file   Tobacco Use    Smoking status: Never Smoker    Smokeless tobacco: Never Used   Substance and Sexual Activity    Alcohol use: No    Drug use: No    Sexual activity: Yes   Lifestyle    Physical activity:     Days per week: Not on file     Minutes per session: Not on file    Stress: Not on file   Relationships    Social connections:     Talks on phone: Not on file     Gets together: Not on file     Attends [employer] service: Not on file     Active member of club or organization: Not on file     Attends meetings of clubs or organizations: Not on file     Relationship status: Not on file    Intimate partner violence:     Fear of current or ex partner: Not on file     Emotionally abused: Not on file     Physically abused: Not on file     Forced sexual activity: Not on file   Other Topics Concern    Not on file   Social History Narrative    Not on file     Family History   Problem Relation Age of Onset    Diabetes Mother     Heart Attack Father          Review of Systems   Cardiovascular: Positive for leg swelling. Skin: Positive for wound. All other systems reviewed and are negative. Objective:   Physical Exam   Constitutional: He is oriented to person, place, and time. He appears well-developed and well-nourished. HENT:   Head: Normocephalic and atraumatic. Eyes: Pupils are equal, round, and reactive to light. Conjunctivae and EOM are normal.   Neck: Normal range of motion. Neck supple. No JVD present. No tracheal deviation present. No thyromegaly present. Cardiovascular: Normal rate, regular rhythm and normal heart sounds. Pulmonary/Chest: Effort normal and breath sounds normal.   Large subq chest and abdominal varicosities   Abdominal: Soft. Bowel sounds are normal. He exhibits mass (varicosities; large suprapubic varicosity).    Musculoskeletal: He exhibits edema (R leg with unna boot in place; L leg with nonpitting edema). Lymphadenopathy:     He has no cervical adenopathy. Neurological: He is alert and oriented to person, place, and time. No cranial nerve deficit. He exhibits normal muscle tone. Coordination normal.   Skin: Skin is warm and dry. Diffuse LE varicosities and spider telangiectases   Psychiatric: He has a normal mood and affect. His behavior is normal. Judgment and thought content normal.   Nursing note and vitals reviewed. R leg wrapped   Pulses:   R bruit  L bruit   2   carotid 2    2   brachial 2    2   radial 2    2   femoral 2    2   popliteal 2    NA   posterior tibial 2    NA   dorsalis pedis 2       bypass graft       MRA 10/2/2017  Impression   1. Enlarged azygos and hemiazygos veins with no evidence of   occlusion or thrombosis. 2. Enlarged venous collaterals along the abdomen to the upper   chest extending into the internal mammary and subclavian regions. 3. Severe stenosis or thrombosis of the most superior aspect of   the infrahepatic inferior vena cava. 4. Filling defect in the infrahepatic inferior vena cava extending   inferiorly into the iliac veins suggesting acute or chronic   partial thrombosis. 5. Occluded or thrombosed left renal vein. 6. Numerous venous collaterals along the abdominal wall extending   from the femoral vasculature as well as extensive retroperitoneal   collaterals and paralumbar collaterals. 7. Enlarged right common iliac and external iliac lymph nodes   suggesting a lymphoproliferative disorder or metastatic   lymphadenopathy. Consider possible biopsy or PET CT scan. CT abd/pelvis 9/11/2017  Impression   Impression:   1. 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 2. Extensive collaterals as above.    3. Question of cavernous transformation portal vein.         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 Will coordinate schedule for  reflux scan at  to be seen by me afterwards and referral back to [address] [place] [address] that day for reapplication of R leg wrap. Pt agreeable.

## 2019-05-17 ENCOUNTER — TELEPHONE (OUTPATIENT)
Dept: VASCULAR SURGERY | Age: 54
End: 2019-05-17

## 2019-05-17 RX ORDER — DOXYCYCLINE HYCLATE 100 MG
100 TABLET ORAL 2 TIMES DAILY
Qty: 62 TABLET | Refills: 0 | Status: SHIPPED | OUTPATIENT
Start: 2019-05-17 | End: 2019-06-17

## 2019-05-17 NOTE — TELEPHONE ENCOUNTER
Spoke with Dayron Banuelos @ [address] [place] [address] - Dr. Noah Velez office 5/16/19 regarding need for this patient to be seen after his appointment here on 6/14/19 to have his wound re-wrapped per Dr. Kang Dudley request.  She stated Dr. Noah Velez would be out of the office that day however there will be staff available to re wrap the patient and care for his wound so go ahead and send him over to the [address] [place] [address],[address] after his appointment - we are aiming for 10:00 at the [address] [place] [address],[address] for nurse visit.  Margaux Short 5/17/19

## 2019-05-17 NOTE — TELEPHONE ENCOUNTER
Spoke with patient about coming in for VDS and appointment with Dr. Marques Caputo then appointment with [facility] on 6/14/19. Patient stated that date & time will be fine. I also gave info to Plush in case she is contacted by the patient.   Randy Cota 5/17/19

## 2019-05-20 ENCOUNTER — HOSPITAL ENCOUNTER (OUTPATIENT)
Dept: WOUND CARE | Age: 54
Discharge: HOME | End: 2019-05-20
Payer: MEDICAID

## 2019-05-20 VITALS
SYSTOLIC BLOOD PRESSURE: 135 MMHG | HEART RATE: 76 BPM | RESPIRATION RATE: 16 BRPM | TEMPERATURE: 97.6 F | DIASTOLIC BLOOD PRESSURE: 76 MMHG

## 2019-05-20 DIAGNOSIS — R60.0 LOCALIZED EDEMA: Primary | ICD-10-CM

## 2019-05-20 DIAGNOSIS — I82.423 THROMBOSIS OF BOTH ILIAC VEINS (HCC): ICD-10-CM

## 2019-05-20 DIAGNOSIS — I74.5 THROMBOSIS OF ILIAC ARTERY (HCC): ICD-10-CM

## 2019-05-20 DIAGNOSIS — I82.220 THROMBOSIS OF INFERIOR VENA CAVA (HCC): ICD-10-CM

## 2019-05-20 PROCEDURE — 11042 DBRDMT SUBQ TIS 1ST 20SQCM/<: CPT | Performed by: SPECIALIST

## 2019-05-20 PROCEDURE — 11042 DBRDMT SUBQ TIS 1ST 20SQCM/<: CPT

## 2019-05-20 PROCEDURE — 29581 APPL MULTLAYER CMPRN SYS LEG: CPT

## 2019-05-20 PROCEDURE — 6370000000 HC RX 637 (ALT 250 FOR IP): Performed by: SPECIALIST

## 2019-05-20 RX ORDER — LIDOCAINE HYDROCHLORIDE 40 MG/ML
2.5 SOLUTION TOPICAL ONCE
Status: COMPLETED | OUTPATIENT
Start: 2019-05-20 | End: 2019-05-20

## 2019-05-20 RX ADMIN — LIDOCAINE HYDROCHLORIDE 2.5 ML: 40 SOLUTION TOPICAL at 08:20

## 2019-05-20 ASSESSMENT — PAIN DESCRIPTION - ONSET: ONSET: ON-GOING

## 2019-05-20 ASSESSMENT — PAIN DESCRIPTION - ORIENTATION: ORIENTATION: RIGHT

## 2019-05-20 ASSESSMENT — PAIN DESCRIPTION - PAIN TYPE: TYPE: CHRONIC PAIN

## 2019-05-20 ASSESSMENT — PAIN DESCRIPTION - LOCATION: LOCATION: LEG

## 2019-05-20 ASSESSMENT — PAIN SCALES - GENERAL: PAINLEVEL_OUTOF10: 2

## 2019-05-20 ASSESSMENT — PAIN DESCRIPTION - DESCRIPTORS: DESCRIPTORS: BURNING

## 2019-05-20 ASSESSMENT — PAIN DESCRIPTION - FREQUENCY: FREQUENCY: INTERMITTENT

## 2019-05-20 NOTE — PROGRESS NOTES
O'Clock 0 3/29/2019  3:17 PM   Undermining Ends___ O'Clock 0 3/29/2019  3:17 PM   Undermining Maxium Distance (cm) 0 5/20/2019  8:08 AM   Wound Assessment Pink;Yellow; Red 5/20/2019  8:08 AM   Drainage Amount Small 5/20/2019  8:08 AM   Drainage Description Serosanguinous 5/20/2019  8:08 AM   Odor None 5/20/2019  8:08 AM   Margins Defined edges 5/20/2019  8:08 AM   Kiah-wound Assessment Dry;Pink; White 5/20/2019  8:08 AM   Non-staged Wound Description Full thickness 5/20/2019  8:08 AM   [place]%Wound Bed 50 5/20/2019  8:08 AM   Red%Wound Bed 10 5/20/2019  8:08 AM   Yellow%Wound Bed 40 5/20/2019  8:08 AM   Other%Wound Bed 0 4/18/2019  8:40 AM   Number of days: 167       Wound 12/31/18 #5 right lower leg medial ) since 1/2016)- venous/lymphedema (Active)   Wound Image   5/1/2019  9:17 AM   Wound Venous 1/30/2019  9:27 AM   Dressing Status Old drainage 3/25/2019  8:34 AM   Dressing/Treatment Hydrofera blue 5/20/2019  8:51 AM   Wound Cleansed Rinsed/Irrigated with saline 5/20/2019  8:08 AM   Wound Length (cm) 2 cm 5/20/2019  8:08 AM   Wound Width (cm) 2.9 cm 5/20/2019  8:08 AM   Wound Depth (cm) 0.1 cm 5/20/2019  8:08 AM   Wound Surface Area (cm^2) 5.8 cm^2 5/20/2019  8:08 AM   Change in Wound Size % (l*w) 69.65 5/20/2019  8:08 AM   Wound Volume (cm^3) 0.58 cm^3 5/20/2019  8:08 AM   Wound Healing % 70 5/20/2019  8:08 AM   Post-Procedure Length (cm) 2.1 cm 5/20/2019  8:44 AM   Post-Procedure Width (cm) 3 cm 5/20/2019  8:44 AM   Post-Procedure Depth (cm) 0.3 cm 5/20/2019  8:44 AM   Post-Procedure Surface Area (cm^2) 6.3 cm^2 5/20/2019  8:44 AM   Post-Procedure Volume (cm^3) 1.89 cm^3 5/20/2019  8:44 AM   Distance Tunneling (cm) 0 cm 5/20/2019  8:08 AM   Tunneling Position ___ O'Clock 0 1/21/2019 10:32 AM   Undermining Starts ___ O'Clock 0 2/18/2019 11:10 AM   Undermining Maxium Distance (cm) 0 5/20/2019  8:08 AM   Wound Assessment Red;Yellow;Slough;Granulation tissue 5/20/2019  8:08 AM   Drainage Amount Small 5/20/2019  8:08 AM Drainage Description Serosanguinous 5/20/2019  8:08 AM   Odor None 5/20/2019  8:08 AM   Margins Defined edges 5/20/2019  8:08 AM   Kiah-wound Assessment Pink; White; Maceration 5/20/2019  8:08 AM   Non-staged Wound Description Full thickness 5/20/2019  8:08 AM   [employer]%Wound Bed 0 2/18/2019 11:10 AM   Red%Wound Bed 75 5/20/2019  8:08 AM   Yellow%Wound Bed 25 5/20/2019  8:08 AM   Black%Wound Bed 0 2/4/2019  8:49 AM   Other%Wound Bed 0 4/18/2019  8:40 AM   Number of days: 139          Percent of Wound Debrided: 100%    Total Surface Area Debrided:  11 sq cm    Diabetic/Pressure/Non Pressure Ulcers only:  Ulcer: Non-Pressure ulcer, fat layer exposed    Bleeding: Minimal    Hemostasis Achieved: by pressure    Procedural Pain: 0  / 10     Post Procedural Pain: 0 / 10     Response to treatment:  Well tolerated by patient. Medial wound clearly smaller. Dr. Sudha Trinidad has scheduled reflux study to evaluate perforators. Plan:     Treatment Note: Please see attached Discharge Instructions. These instructions were given and signed by the patient or POA    New Medication(s) at this visit:   New Prescriptions    No medications on file       In my professional opinion this patient would benefit from HBO Therapy: No       Patient is to return to wound care center in:  1 week(s)    Discharge [address] [facility] Physician Orders and Discharge Instructions  The [place] [address]  Telephone: [phone] hands with soap and water prior to and after every dressing change. Wound Cleansing: clean wound with vashe soaked gauze for 5 minutes   · Do not scrub or use excessive force. · With each dressing change, rinse wounds with 0.9% Saline. (May use wound wash or soft contact solution. Both can be purchased at a local drug store). · If unable to obtain saline, may use a gentle soap and water.   · Keep wounds dry in the shower unless otherwise instructed by the physician. Topical Treatments:  Do not apply lotions, creams, or ointments to wound bed unless directed as followed:     [x] Apply moisturizing lotion to skin surrounding the wound prior to dressing change  [] Apply antifungal ointment to skin surrounding the wound prior to dressing change  [] Apply thin film of moisture barrier ointment to skin immediately around wound. [] Other:      Dressings:           Wound Location: right lower leg wound      [x] Apply Primary Dressing to wound:       [] Foam/Foam with Border(i.e Mepilex) [] Non-adherent (i.e.Mepitel)   [] Alginate with Silver (i.e. Silvercel)  [] Alginate (i.e. Aquacel)   [] Collagen (i.e. Puracol)   [] Collagen with Silver(i.e. Candice)     [] Hydrocolloid    [x] Hydrafera Blue moistened with saline   [] MediHoney Paste/Gel   [] Hydrogel    [x] Endoform      [] Santyl covered with gauze moisten with saline      [] Betadine   [] Bactroban/Mupirocin   [] Polysporin/Neosporin  [] Other:    [] Saline \"wet to dry\" gauze     Pack wound loosely with: [] Iodoform   [] Plain Packing  [] Saline \"wet to dry\"      [x] Cover and Secure with:     [x] Dry Gauze  [] ABD  [] Cast padding  [] Kerlix or Lyndsey rolled gauze   [] Coban  [] Ace Wrap [] Ace Wrap from forefoot to just below the knee  [] Paper Tape [] Medipore Tape [] Other:    Avoid contact of tape with skin if possible. [x] Change dressing: [] Daily    [] Every Other Day  [] Three times per week:  [] Monday, Wednesday, Friday  [] Tuesday, Thursday, Saturday   [] Once a week  [x] Do Not Change Dressing [] Other:          Edema Control:  Apply: [x] Compression Stocking  [] Left Lower Leg [x] Right Lower Leg     Apply every morning immediately when getting up. They should be applied to affected leg(s) from mid foot to knee making sure to cover the heel. Remove every night before going to bed.       [x] Elevate leg(s) above the level of the heart for 30 minutes 4-5 times a day and/or when sitting. [x] Avoid prolonged standing in one place. Lymphedema Therapy:   [x] Wear Lymphedema pumps twice a day at settings prescribed by your physician. [x]Elevate leg(s) above the level of the heart for 30 minutes 4-5 times a day and/or when sitting. [x] Avoid prolonged standing in one place. Multilayer Compression Wrap:  Type: 4 Layer Compression Wrap      Applied in Clinic to the :  Right lower leg(s)     · Do not get leg(s) with wrap wet. · If wraps become too tight call the center or completely remove the wrap. · Elevate leg(s) above the level of the heart when sitting. · Avoid prolonged standing in one place. [] [facility] remove wrap, cleanse affected leg(s) with soap and water, apply wound dressings as ordered above and reapply compression wraps on:        Dietary:  Important dietary reminders:  1. Increase Protein intake (i.e. Lean meats, fish, eggs, legumes, and yogurt)  2. No added salt  3. If diabetic, follow a diabetic diet and check glucose prior to meals or as instructed by your physician. Dietary Supplements: [] Lisa Pale [] 30ml ProMod/ProStat [] none [] Other:   Take supplements twice a day or as directed as followed:       Return Appointment:   DME/Wound Dressing Supplies Provided by:   (Please call them directly to reorder supplies when you run out)   ECF or Home Healthcare:   Wound reassessment visit with Nurse :05/24/19     Return Appointment: With Dr. Kinsey Esparza   Next Friday.  05/31/19        [x] Orders placed during your visit:   Orders Placed This Encounter   Procedures    Wound care         Your nurse  is:  Lorraine Van     Electronically signed by Ines Potts RN on 5/20/2019 at 8:34 AM     [facility] Information: Should you experience any significant changes in your wound(s) or have questions about your wound care, please contact the [address] [place] [address] at [phone] Monday-Friday from 8:00 am - 4:30 pm except for Wednesdays which hours are from 8:00 am - 2:00 pm.   If you need help with your wound outside these hours and cannot wait until we are again available, contact your PCP or go to the hospital emergency room. PLEASE NOTE: IF YOU ARE UNABLE TO OBTAIN WOUND SUPPLIES, CONTINUE TO USE THE SUPPLIES YOU HAVE AVAILABLE UNTIL YOU ARE ABLE TO REACH US. IT IS MOST IMPORTANT TO KEEP THE WOUND COVERED AT ALL TIMES.       [] Patient unable to sign Discharge Instructions given to ECF/Transportation/POA        Electronically signed by Michael Leung MD on 5/20/2019 at 9:19 AM

## 2019-05-24 ENCOUNTER — HOSPITAL ENCOUNTER (OUTPATIENT)
Dept: WOUND CARE | Age: 54
Discharge: HOME | End: 2019-05-24
Payer: MEDICAID

## 2019-05-24 VITALS
DIASTOLIC BLOOD PRESSURE: 63 MMHG | SYSTOLIC BLOOD PRESSURE: 134 MMHG | HEART RATE: 69 BPM | TEMPERATURE: 98 F | RESPIRATION RATE: 16 BRPM

## 2019-05-24 PROCEDURE — 29581 APPL MULTLAYER CMPRN SYS LEG: CPT

## 2019-05-24 ASSESSMENT — PAIN SCALES - GENERAL: PAINLEVEL_OUTOF10: 2

## 2019-05-24 NOTE — PLAN OF CARE
Multilayer Compression Wrap   (Not Unna) Below the Knee    NAME:  Dhruv Garcia  YOB: 1965  MEDICAL RECORD NUMBER:  [id number]  DATE:  5/24/2019       [x] Removed old Multilayer wrap if present and washed leg with mild soap/water.  [x] Applied moisturizing agent to dry skin as needed.  [x] Applied primary and secondary dressing as ordered     [x] Applied multilayered dressing below the knee to Right lower leg(s)  (4 Layer Compression Wrap) per 's instructions.  [x] Instructed patient/caregiver not to remove dressing and to keep it clean and dry.  [x] Instructed patient/caregiver on complications to report to provider, such as pain, numbness in toes, heavy drainage, and slippage of dressing.  [x] Instructed patient on purpose of compression dressing and on activity and exercise recommendations.        Applied per   Guidelines    Electronically signed by Leonor Buckley RN on 5/24/2019 at 9:24 AM

## 2019-05-31 ENCOUNTER — HOSPITAL ENCOUNTER (OUTPATIENT)
Dept: WOUND CARE | Age: 54
Discharge: HOME | End: 2019-05-31
Payer: MEDICAID

## 2019-05-31 ENCOUNTER — ANTI-COAG VISIT (OUTPATIENT)
Dept: PHARMACY | Age: 54
End: 2019-05-31
Payer: MEDICAID

## 2019-05-31 VITALS
WEIGHT: 232.81 LBS | SYSTOLIC BLOOD PRESSURE: 144 MMHG | TEMPERATURE: 97.8 F | HEART RATE: 74 BPM | RESPIRATION RATE: 16 BRPM | DIASTOLIC BLOOD PRESSURE: 89 MMHG | BODY MASS INDEX: 38.74 KG/M2

## 2019-05-31 DIAGNOSIS — L97.919 IDIOPATHIC CHRONIC VENOUS HYPERTENSION OF RIGHT LOWER EXTREMITY WITH ULCER (HCC): Primary | ICD-10-CM

## 2019-05-31 DIAGNOSIS — I82.5Z9 CHRONIC VENOUS EMBOLISM AND THROMBOSIS OF DEEP VESSELS OF DISTAL LOWER EXTREMITY, UNSPECIFIED LATERALITY (HCC): ICD-10-CM

## 2019-05-31 DIAGNOSIS — I87.311 IDIOPATHIC CHRONIC VENOUS HYPERTENSION OF RIGHT LOWER EXTREMITY WITH ULCER (HCC): Primary | ICD-10-CM

## 2019-05-31 DIAGNOSIS — Z79.01 CHRONIC ANTICOAGULATION: ICD-10-CM

## 2019-05-31 LAB — INTERNATIONAL NORMALIZATION RATIO, POC: 2.6

## 2019-05-31 PROCEDURE — 99211 OFF/OP EST MAY X REQ PHY/QHP: CPT

## 2019-05-31 PROCEDURE — 85610 PROTHROMBIN TIME: CPT

## 2019-05-31 PROCEDURE — 6370000000 HC RX 637 (ALT 250 FOR IP): Performed by: SPECIALIST

## 2019-05-31 PROCEDURE — 11042 DBRDMT SUBQ TIS 1ST 20SQCM/<: CPT | Performed by: SPECIALIST

## 2019-05-31 PROCEDURE — 11042 DBRDMT SUBQ TIS 1ST 20SQCM/<: CPT

## 2019-05-31 RX ORDER — LIDOCAINE HYDROCHLORIDE 40 MG/ML
2.5 SOLUTION TOPICAL ONCE
Status: COMPLETED | OUTPATIENT
Start: 2019-05-31 | End: 2019-05-31

## 2019-05-31 RX ADMIN — LIDOCAINE HYDROCHLORIDE 2.5 ML: 40 SOLUTION TOPICAL at 09:42

## 2019-05-31 NOTE — PROGRESS NOTES
1227 [facility]  Progress Note and Procedure Note      Annika Hair  MEDICAL RECORD NUMBER:  [id number]  AGE: 47 y.o. GENDER: male  : 1965  EPISODE DATE:  2019    Subjective:     Chief Complaint   Patient presents with    Wound Check     RLE         HISTORY of PRESENT ILLNESS HPI     Annika Hair is a 47 y.o. male who presents today for wound/ulcer evaluation. History of Wound Context: Aryan is a 48 y. o. 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 Seen at [address] [place] [address] where unsuccessful dilatation of IVC was done. He has been grafted with compression wraps and lymphedema pumps with near complete healing. However, he has gone back to work and is on his feet causing partial loss of graft. He now attempted to wear his compression stockings but he states they are difficult to wear. He remains working 6 days a week and he on his feet. Numerous attempts to encourage lifestyle with work are unsuccessful. He is now wearing his compression stockings who presents today for wound/ulcer evaluation He has undergone two applications of Theraskin allograft. A recent DVT study LLE positive but patient refused admission for treatment. Now undergoing series of allograft applications with continued re epithelialization. He denies any pain or drainage   Await Dr. Reema Taylor input as far as potential perforators keeping from complete wound closure. Now await reflux study.         Pain Assessment:  Wound/Ulcer Pain Timing/Severity: none  Quality of pain: N/A  Severity:  0 / 10   Modifying Factors: None  Associated Signs/Symptoms: edema    Ulcer Identification:  Ulcer Type: venous  Contributing Factors: edema, venous stasis, lymphedema and obesity    Objective:      BP (!) 144/89   Pulse 74   Temp 97.8 °F (36.6 °C) (Oral)   Resp 16   Wt 232 lb 12.9 oz (105.6 kg)   BMI 38.74 kg/m²     Wt Readings from Last 3 Encounters:   19 232 lb 12.9 oz (105.6 kg)   19 236 lb 1/28/2019  8:25 AM   Undermining Starts ___ O'Clock 0 3/29/2019  3:17 PM   Undermining Ends___ O'Clock 0 3/29/2019  3:17 PM   Undermining Maxium Distance (cm) 0 5/31/2019  9:35 AM   Wound Assessment Yellow;Red;Slough 5/31/2019  9:35 AM   Drainage Amount Small 5/31/2019  9:35 AM   Drainage Description Serosanguinous 5/31/2019  9:35 AM   Odor None 5/31/2019  9:35 AM   Margins Defined edges 5/31/2019  9:35 AM   Kiah-wound Assessment Dry;Pink; Tan 5/31/2019  9:35 AM   Non-staged Wound Description Full thickness 5/31/2019  9:35 AM   [place]%Wound Bed 20 5/24/2019  8:50 AM   Red%Wound Bed 30 5/31/2019  9:35 AM   Yellow%Wound Bed 70 5/31/2019  9:35 AM   Other%Wound Bed 0 4/18/2019  8:40 AM   Number of days: 179       Wound 12/31/18 #5 right lower leg medial ) since 1/2016)- venous/lymphedema (Active)   Wound Image   5/31/2019  9:35 AM   Wound Venous 1/30/2019  9:27 AM   Dressing Status Old drainage 3/25/2019  8:34 AM   Dressing/Treatment Hydrofera blue 5/31/2019  9:46 AM   Wound Cleansed Rinsed/Irrigated with saline 5/31/2019  9:35 AM   Wound Length (cm) 1.9 cm 5/31/2019  9:35 AM   Wound Width (cm) 2.4 cm 5/31/2019  9:35 AM   Wound Depth (cm) 0.1 cm 5/31/2019  9:35 AM   Wound Surface Area (cm^2) 4.56 cm^2 5/31/2019  9:35 AM   Change in Wound Size % (l*w) 76.14 5/31/2019  9:35 AM   Wound Volume (cm^3) 0.46 cm^3 5/31/2019  9:35 AM   Wound Healing % 76 5/31/2019  9:35 AM   Post-Procedure Length (cm) 2 cm 5/31/2019  9:46 AM   Post-Procedure Width (cm) 2.5 cm 5/31/2019  9:46 AM   Post-Procedure Depth (cm) 0.3 cm 5/31/2019  9:46 AM   Post-Procedure Surface Area (cm^2) 5 cm^2 5/31/2019  9:46 AM   Post-Procedure Volume (cm^3) 1.5 cm^3 5/31/2019  9:46 AM   Distance Tunneling (cm) 0 cm 5/31/2019  9:35 AM   Tunneling Position ___ O'Clock 0 1/21/2019 10:32 AM   Undermining Starts ___ O'Clock 0 2/18/2019 11:10 AM   Undermining Maxium Distance (cm) 0 5/31/2019  9:35 AM   Wound Assessment Red;Yellow;Pink 5/31/2019  9:35 AM   Drainage Amount and water. · Keep wounds dry in the shower unless otherwise instructed by the physician. Topical Treatments:  Do not apply lotions, creams, or ointments to wound bed unless directed as followed:     [] Apply moisturizing lotion to skin surrounding the wound prior to dressing change  [] Apply antifungal ointment to skin surrounding the wound prior to dressing change  [] Apply thin film of moisture barrier ointment to skin immediately around wound. [] Other:      Dressings:           Wound Location: right lower leg wounds      [x] Apply Primary Dressing to wound:       [] Foam/Foam with Border(i.e Mepilex) [] Non-adherent (i.e.Mepitel)   [] Alginate with Silver (i.e. Silvercel)  [] Alginate (i.e. Aquacel)   [] Collagen (i.e. Puracol)   [] Collagen with Silver(i.e. Candice)     [] Hydrocolloid    [x] Hydrafera Blue moistened with saline   [] MediHoney Paste/Gel   [] Hydrogel    [] Endoform      [] Santyl covered with gauze moisten with saline      [] Betadine   [] Bactroban/Mupirocin   [] Polysporin/Neosporin  [] Other:    [] Saline \"wet to dry\" gauze     Pack wound loosely with: [] Iodoform   [] Plain Packing  [] Saline \"wet to dry\"      [x] Cover and Secure with:     [x] Dry Gauze  [] ABD  [] Cast padding  [x] Kerlix or Lyndsey rolled gauze   [] Coban  [] Ace Wrap [] Ace Wrap from forefoot to just below the knee  [] Paper Tape [] Medipore Tape [] Other:    Avoid contact of tape with skin if possible. [x] Change dressing: [] Daily    [] Every Other Day  [x] Three times per week:  [x] Monday, Wednesday, Friday  [] Tuesday, Thursday, Saturday   [] Once a week  [] Do Not Change Dressing [] Other:       Edema Control:  Apply: [x] Compression Stocking  [x] Left Lower Leg [x] Right Lower Leg    Apply every morning immediately when getting up. They should be applied to affected leg(s) from mid foot to knee making sure to cover the heel. Remove every night before going to bed.       [x] Elevate leg(s) above the level of the heart for 30 minutes 4-5 times a day and/or when sitting. [x] Avoid prolonged standing in one place. Lymphedema Therapy:   [x] Wear Lymphedema pumps twice a day at settings prescribed by your physician. [x]Elevate leg(s) above the level of the heart for 30 minutes 4-5 times a day and/or when sitting. [x] Avoid prolonged standing in one place. Dietary:  Important dietary reminders:  1. Increase Protein intake (i.e. Lean meats, fish, eggs, legumes, and yogurt)  2. No added salt  3. If diabetic, follow a diabetic diet and check glucose prior to meals or as instructed by your physician. Return Appointment:   DME/Wound Dressing Supplies Provided by:   (Please call them directly to reorder supplies when you run out)   ECF or Home Healthcare:   Wound reassessment visit with Nurse :    Neida [place] Return Appointment: With Dr. Davis Lux  On Wednesday 06/12/19        [x] Orders placed during your visit: No orders of the defined types were placed in this encounter. Your nurse  is:  Yesenia     Electronically signed by Eleonora Vaughan RN on 5/31/2019 at 9:49 AM     [address] [place] [address] Information: Should you experience any significant changes in your wound(s) or have questions about your wound care, please contact the [address] [place] [address] at [phone] Monday-Friday from 8:00 am - 4:30 pm except for Wednesdays which hours are from 8:00 am - 2:00 pm.   If you need help with your wound outside these hours and cannot wait until we are again available, contact your PCP or go to the hospital emergency room. PLEASE NOTE: IF YOU ARE UNABLE TO OBTAIN WOUND SUPPLIES, CONTINUE TO USE THE SUPPLIES YOU HAVE AVAILABLE UNTIL YOU ARE ABLE TO REACH US. IT IS MOST IMPORTANT TO KEEP THE WOUND COVERED AT ALL TIMES.       [] Patient unable to sign Discharge Instructions given to ECF/Transportation/POA        Electronically signed by Chelsy Allred MD on 5/31/2019 at 1:16 PM

## 2019-05-31 NOTE — PROGRESS NOTES
changes None reported today   Medications taken regularly that may interact with warfarin or alter INR Doxycycline   Warfarin dose taken as prescribed Yes - does not use pillbox (only takes two medications)   Signs/symptoms of bleeding No h/o bleeding reported   Vitamin K intake Normally avoids high vitamin K foods: ~0-1 serving per week   Recent vomiting/diarrhea/fever, changes in weight or activity level None reported   Tobacco or alcohol use Patient reports smoking 0 PPD  Patient reports having 0 drinks per day   Upcoming surgeries or procedures None reported     Assessment/Plan:  Patient's INR was therapeutic today (2.6). Patient denies any changes today. Patient continues to avoid high vitamin K foods. Patient was instructed to continue warfarin 7.5 mg daily, except 10 mg on Fridays. Repeat INR in 4 weeks. Patient was reminded to maintain consistent vitamin K intake and call with any bleeding, medication changes, or fever/vomiting/diarrhea. Patient understands dosing directions and information discussed. Dosing schedule and follow up appointment given to patient. Progress note routed to referring physician's office. Patient acknowledges working in consult agreement with pharmacist as referred by his/her physician. Next Clinic Appointment:  6/28 (prefers appts at beginning or end of month if possible)    Please call [facility] at [phone] with any questions. Thanks! Arleene Appl. Gattis Dakins, Josue  [facility]  Ph: [phone]  5/31/2019 11:28 AM

## 2019-06-04 ENCOUNTER — TELEPHONE (OUTPATIENT)
Dept: WOUND CARE | Age: 54
End: 2019-06-04

## 2019-06-05 RX ORDER — SODIUM CHLOR/HYPOCHLOROUS ACID 0.033 %
5 SOLUTION, IRRIGATION IRRIGATION
Qty: 250 BOTTLE | Refills: 0 | Status: SHIPPED | OUTPATIENT
Start: 2019-06-05 | End: 2022-04-18

## 2019-06-05 NOTE — TELEPHONE ENCOUNTER
Vashe wash escribed to patients pharmacy listed in [facility]. [facility] in [facility].

## 2019-06-12 ENCOUNTER — TELEPHONE (OUTPATIENT)
Dept: VASCULAR SURGERY | Age: 54
End: 2019-06-12

## 2019-06-12 ENCOUNTER — HOSPITAL ENCOUNTER (OUTPATIENT)
Dept: WOUND CARE | Age: 54
Discharge: HOME | End: 2019-06-12
Payer: MEDICAID

## 2019-06-12 VITALS
SYSTOLIC BLOOD PRESSURE: 153 MMHG | TEMPERATURE: 97.8 F | HEART RATE: 70 BPM | RESPIRATION RATE: 16 BRPM | DIASTOLIC BLOOD PRESSURE: 82 MMHG

## 2019-06-12 DIAGNOSIS — L97.919 IDIOPATHIC CHRONIC VENOUS HYPERTENSION OF RIGHT LOWER EXTREMITY WITH ULCER (HCC): ICD-10-CM

## 2019-06-12 DIAGNOSIS — I89.0 CHRONIC ACQUIRED LYMPHEDEMA: Primary | ICD-10-CM

## 2019-06-12 DIAGNOSIS — I87.311 IDIOPATHIC CHRONIC VENOUS HYPERTENSION OF RIGHT LOWER EXTREMITY WITH ULCER (HCC): ICD-10-CM

## 2019-06-12 PROCEDURE — 11042 DBRDMT SUBQ TIS 1ST 20SQCM/<: CPT

## 2019-06-12 PROCEDURE — 6370000000 HC RX 637 (ALT 250 FOR IP): Performed by: SPECIALIST

## 2019-06-12 PROCEDURE — 11042 DBRDMT SUBQ TIS 1ST 20SQCM/<: CPT | Performed by: SPECIALIST

## 2019-06-12 RX ORDER — LIDOCAINE HYDROCHLORIDE 40 MG/ML
2.5 SOLUTION TOPICAL ONCE
Status: COMPLETED | OUTPATIENT
Start: 2019-06-12 | End: 2019-06-12

## 2019-06-12 RX ADMIN — LIDOCAINE HYDROCHLORIDE 2.5 ML: 40 SOLUTION TOPICAL at 09:22

## 2019-06-12 ASSESSMENT — PAIN DESCRIPTION - DESCRIPTORS: DESCRIPTORS: BURNING

## 2019-06-12 ASSESSMENT — PAIN SCALES - GENERAL: PAINLEVEL_OUTOF10: 2

## 2019-06-12 ASSESSMENT — PAIN DESCRIPTION - ONSET: ONSET: ON-GOING

## 2019-06-12 ASSESSMENT — PAIN DESCRIPTION - PAIN TYPE: TYPE: CHRONIC PAIN

## 2019-06-12 ASSESSMENT — PAIN DESCRIPTION - LOCATION: LOCATION: LEG

## 2019-06-12 ASSESSMENT — PAIN DESCRIPTION - ORIENTATION: ORIENTATION: RIGHT

## 2019-06-12 ASSESSMENT — PAIN DESCRIPTION - PROGRESSION: CLINICAL_PROGRESSION: NOT CHANGED

## 2019-06-12 ASSESSMENT — PAIN DESCRIPTION - FREQUENCY: FREQUENCY: INTERMITTENT

## 2019-06-12 NOTE — TELEPHONE ENCOUNTER
Per call to Cinthya Hager at [facility]  Patient is in his own compression stockings and is doing his own dressing changes. His wound is not completley healed however edema is decreased. Patient does not need to go to [facility] after appointment with Dr. Terry Feliciano on 6/14/19 - can do his own care.   Alfreda Mitchell 6/12/19

## 2019-06-12 NOTE — PROGRESS NOTES
1227 [facility]  Progress Note and Procedure Note      Joe Keller  MEDICAL RECORD NUMBER:  [id number]  AGE: 47 y.o. GENDER: male  : 1965  EPISODE DATE:  2019    Subjective:     Chief Complaint   Patient presents with    Wound Check     RLL         HISTORY of PRESENT ILLNESS HPI     Joe Keller is a 47 y.o. male who presents today for wound/ulcer evaluation. History of Wound Context:Aryan is a 48 y. o. 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 Seen at [address] [place] [address] where unsuccessful dilatation of IVC was done. He has been grafted with compression wraps and lymphedema pumps with near complete healing. However, he has gone back to work and is on his feet causing partial loss of graft. He now attempted to wear his compression stockings but he states they are difficult to wear. He remains working 6 days a week and he on his feet. Numerous attempts to encourage lifestyle with work are unsuccessful. He is now wearing his compression stockings who presents today for wound/ulcer evaluation He has undergone two applications of Theraskin allograft. Now undergoing series of allograft applications with continued re epithelialization.  He denies any pain or drainage   Await Dr. Gan Common input as far as potential perforators keeping from complete wound closure. Now await reflux study He is now wearing his own compression on RLE         Pain Assessment:  Wound/Ulcer Pain Timing/Severity: none  Quality of pain: N/A  Severity:  0 / 10   Modifying Factors: None  Associated Signs/Symptoms: edema    Ulcer Identification:  Ulcer Type: venous  Contributing Factors: edema, venous stasis, lymphedema and obesity    Objective:      BP (!) 153/82   Pulse 70   Temp 97.8 °F (36.6 °C) (Oral)   Resp 16     Wt Readings from Last 3 Encounters:   19 232 lb 12.9 oz (105.6 kg)   19 236 lb (107 kg)   18 227 lb 6.4 oz (103.1 kg)       PHYSICAL EXAM    Extremities: no cyanosis, no clubbing and no cyanosis, no clubbing, 3 + nonpitting edema-  bilateral with varicosities and fibrosis, 2 small granulating wounds rt. knee        Assessment:      No diagnosis found. Procedure Note  Indications:  Based on my examination of this patient's wound(s) today, sharp excision is required to promote healing and evaluate the extent healing. Performed by: Alex Evans MD    Consent obtained? Yes    Time out taken: Yes    Pain Control: Anesthetic: 4% Lidocaine Liquid Topical     Debridement:Excisional Debridement    Using curette the wound was sharply debrided    down through and including the removal of  epidermis, dermis and subcutaneous tissue.     Devitalized Tissue Debrided:  fibrin      Pre Debridement Measurements:  Are located in the Wound Documentation Flow Sheet   Wound #: 5 and 6     Post  Debridement Measurements:  Wound 12/03/18 #6 right lower leg posterior (since 5/14/2018) (Active)   Wound Image   5/31/2019  9:35 AM   Wound Venous 3/4/2019  9:05 AM   Dressing Status Old drainage 4/1/2019  8:36 AM   Dressing/Treatment Hydrofera blue 6/12/2019  9:22 AM   Wound Cleansed Rinsed/Irrigated with saline 6/12/2019  9:22 AM   Wound Length (cm) 2.1 cm 6/12/2019  9:22 AM   Wound Width (cm) 1.9 cm 6/12/2019  9:22 AM   Wound Depth (cm) 0.1 cm 6/12/2019  9:22 AM   Wound Surface Area (cm^2) 3.99 cm^2 6/12/2019  9:22 AM   Change in Wound Size % (l*w) 21.76 6/12/2019  9:22 AM   Wound Volume (cm^3) 0.4 cm^3 6/12/2019  9:22 AM   Wound Healing % 22 6/12/2019  9:22 AM   Post-Procedure Length (cm) 2.2 cm 6/12/2019  9:48 AM   Post-Procedure Width (cm) 2 cm 6/12/2019  9:48 AM   Post-Procedure Depth (cm) 0.3 cm 6/12/2019  9:48 AM   Post-Procedure Surface Area (cm^2) 4.4 cm^2 6/12/2019  9:48 AM   Post-Procedure Volume (cm^3) 1.32 cm^3 6/12/2019  9:48 AM   Distance Tunneling (cm) 0 cm 6/12/2019  9:22 AM   Tunneling Position ___ O'Clock 0 1/28/2019  8:25 AM   Undermining Starts ___ O'Clock 0 3/29/2019 3:17 PM   Undermining Ends___ O'Clock 0 3/29/2019  3:17 PM   Undermining Maxium Distance (cm) 0 6/12/2019  9:22 AM   Wound Assessment Yellow;Red;Slough 6/12/2019  9:22 AM   Drainage Amount Moderate 6/12/2019  9:22 AM   Drainage Description Brown;Serosanguinous 6/12/2019  9:22 AM   Odor None 6/12/2019  9:22 AM   Margins Defined edges 6/12/2019  9:22 AM   Kiah-wound Assessment Dry;Pink; Tan 6/12/2019  9:22 AM   Non-staged Wound Description Full thickness 5/31/2019  9:35 AM   [place]%Wound Bed 20 5/24/2019  8:50 AM   Red%Wound Bed 30 6/12/2019  9:22 AM   Yellow%Wound Bed 70 6/12/2019  9:22 AM   Other%Wound Bed 0 4/18/2019  8:40 AM   Number of days: 191       Wound 12/31/18 #5 right lower leg medial ) since 1/2016)- venous/lymphedema (Active)   Wound Image   5/31/2019  9:35 AM   Wound Venous 1/30/2019  9:27 AM   Dressing Status Old drainage 3/25/2019  8:34 AM   Dressing/Treatment Hydrofera blue 6/12/2019  9:22 AM   Wound Cleansed Rinsed/Irrigated with saline 6/12/2019  9:22 AM   Wound Length (cm) 1.9 cm 6/12/2019  9:22 AM   Wound Width (cm) 2.4 cm 6/12/2019  9:22 AM   Wound Depth (cm) 0.1 cm 6/12/2019  9:22 AM   Wound Surface Area (cm^2) 4.56 cm^2 6/12/2019  9:22 AM   Change in Wound Size % (l*w) 76.14 6/12/2019  9:22 AM   Wound Volume (cm^3) 0.46 cm^3 6/12/2019  9:22 AM   Wound Healing % 76 6/12/2019  9:22 AM   Post-Procedure Length (cm) 2 cm 6/12/2019  9:48 AM   Post-Procedure Width (cm) 2.5 cm 6/12/2019  9:48 AM   Post-Procedure Depth (cm) 0.3 cm 6/12/2019  9:48 AM   Post-Procedure Surface Area (cm^2) 5 cm^2 6/12/2019  9:48 AM   Post-Procedure Volume (cm^3) 1.5 cm^3 6/12/2019  9:48 AM   Distance Tunneling (cm) 0 cm 6/12/2019  9:22 AM   Tunneling Position ___ O'Clock 0 1/21/2019 10:32 AM   Undermining Starts ___ O'Clock 0 2/18/2019 11:10 AM   Undermining Maxium Distance (cm) 0 6/12/2019  9:22 AM   Wound Assessment Yellow;Red;Granulation tissue 6/12/2019  9:22 AM   Drainage Amount Small 6/12/2019  9:22 AM   Drainage Description Serosanguinous; Yellow;Brown 6/12/2019  9:22 AM   Odor None 6/12/2019  9:22 AM   Margins Defined edges 6/12/2019  9:22 AM   Kiah-wound Assessment Pink; Tan 6/12/2019  9:22 AM   Non-staged Wound Description Full thickness 6/12/2019  9:22 AM   [place]%Wound Bed 30 5/31/2019  9:35 AM   Red%Wound Bed 50 6/12/2019  9:22 AM   Yellow%Wound Bed 50 6/12/2019  9:22 AM   Black%Wound Bed 0 2/4/2019  8:49 AM   Other%Wound Bed 0 4/18/2019  8:40 AM   Number of days: 163          Percent of Wound Debrided: 100%    Total Surface Area Debrided:  9 sq cm    Diabetic/Pressure/Non Pressure Ulcers only:  Ulcer: Non-Pressure ulcer, fat layer exposed    Bleeding: Minimal    Hemostasis Achieved: by pressure    Procedural Pain: 0  / 10     Post Procedural Pain: 0 / 10     Response to treatment:  Well tolerated by patient. He is wearing compression and using lymphatic pumps as 2 remaining wounds are slowly re epithelializing         Plan:     Treatment Note: Please see attached Discharge Instructions. These instructions were given and signed by the patient or POA    New Medication(s) at this visit:   New Prescriptions    No medications on file       Other orders at this visit:   Orders Placed This Encounter   Procedures    Wound care       Discharge [address] [facility] Physician Orders and Discharge Instructions  [facility] [place] [address]  Telephone: [phone] hands with soap and water prior to and after every dressing change. Wound Cleansing:   · Do not scrub or use excessive force. · With each dressing change, rinse wounds with Vashe Wash wound cleanser  · Keep wounds dry in the shower unless otherwise instructed by the physician.     Topical Treatments:  Do not apply lotions, creams, or ointments to wound bed unless directed as followed:     [x] Apply moisturizing lotion to skin surrounding the wound prior to dressing change  [] Apply antifungal ointment to skin surrounding the wound prior to dressing change  [] Apply thin film of moisture barrier ointment to skin immediately around wound. [] Other:     Dressings:           Wound Location: Right Lower leg     [x] Apply Primary Dressing to wound:       [] Foam/Foam with Border(i.e Mepilex) [] Non-adherent (i.e.Mepitel)   [] Alginate with Silver (i.e. Silvercel)  [] Alginate (i.e. Aquacel)   [] Collagen (i.e. Puracol)   [] Collagen with Silver(i.e. Candice)     [] Hydrocolloid    [x] Hydrafera Blue moistened with saline   [] MediHoney Paste/Gel   [] Hydrogel    [] Endoform      [] Santyl covered with gauze moisten with saline     [] Betadine   [] Bactroban/Mupirocin   [] Polysporin/Neosporin  [] Other:    [] Saline \"wet to dry\" gauze     Pack wound loosely with: [] Iodoform   [] Plain Packing  [] Other:      [x] Cover and Secure with:     [x] Dry Gauze  [] ABD [] Cast padding [] Kerlix or Lyndsey rolled gauze   [] Coban  [] Ace Wrap [] Ace Wrap from forefoot to just below the knee [] Paper Tape [] Medipore Tape [] Other:    Avoid contact of tape with skin if possible. [x] Change dressing: [] Daily    [] Every Other Day  [x] Three times per week:  [x] Monday, Wednesday, Friday  [] Tuesday, Thursday, Saturday   [] Once a week  [] Do Not Change Dressing [] Other:       Edema Control:  Apply: [x] Compression Stocking  [x] Left Lower Leg [x] Right Lower Leg     Apply every morning immediately when getting up. They should be applied to affected leg(s) from mid foot to knee making sure to cover the heel. Remove every night before going to bed. [x] Elevate leg(s) above the level of the heart for 30 minutes 4-5 times a day and/or when sitting. [x] Avoid prolonged standing in one place. Lymphedema Therapy:   [x] Wear Lymphedema pumps twice a day at settings prescribed by your physician.        [x]Elevate leg(s) above the level of the heart for 30 minutes 4-5 times a day and/or when sitting. [x] Avoid prolonged standing in one place. Dietary:  Important dietary reminders:  1. Increase Protein intake (i.e. Lean meats, fish, eggs, legumes, and yogurt)  2. No added salt  3. If diabetic, follow a diabetic diet and check glucose prior to meals or as instructed by your physician. Dietary Supplements: [] Papito [] 30ml ProMod/ProStat [] none [] Other:   Take supplements twice a day or as directed as followed:     Smoking:  Please talk with your Primary Health Care Provider about assistance to help stop smoking. Please read the additional information attached to these instructions if included. Return Appointment:   DME/Wound Dressing Supplies Provided by:   (Please call them directly to reorder supplies when you run out)   ECF or Home Healthcare:   Wound reassessment visit with Nurse :    [place] Return Appointment: With Dr Terrence Whitaker  in  [address] [place] [address]). o Schedule for the next 2 weeks until: Date: 6/25      [x] Orders placed during your visit:   Orders Placed This Encounter   Procedures    Wound care         Your nurse  is:  Rafael Johnson     Electronically signed by Negin Pradhan RN on 6/12/2019 at 9:54 AM     [address] [place] [address] Information: Should you experience any significant changes in your wound(s) or have questions about your wound care, please contact the [address] [place] [address] at [phone] Monday-Friday from 8:00 am - 4:30 pm except for Wednesdays which hours are from 8:00 am - 2:00 pm.   If you need help with your wound outside these hours and cannot wait until we are again available, contact your PCP or go to the hospital emergency room. PLEASE NOTE: IF YOU ARE UNABLE TO OBTAIN WOUND SUPPLIES, CONTINUE TO USE THE SUPPLIES YOU HAVE AVAILABLE UNTIL YOU ARE ABLE TO REACH US. IT IS MOST IMPORTANT TO KEEP THE WOUND COVERED AT ALL TIMES.       [] Patient unable to sign Discharge Instructions given to ECF/Transportation/POA          Electronically signed by [facility]

## 2019-06-14 ENCOUNTER — PROCEDURE VISIT (OUTPATIENT)
Dept: VASCULAR SURGERY | Age: 54
End: 2019-06-14
Payer: MEDICAID

## 2019-06-14 ENCOUNTER — OFFICE VISIT (OUTPATIENT)
Dept: VASCULAR SURGERY | Age: 54
End: 2019-06-14
Payer: MEDICAID

## 2019-06-14 VITALS — DIASTOLIC BLOOD PRESSURE: 95 MMHG | HEART RATE: 64 BPM | SYSTOLIC BLOOD PRESSURE: 153 MMHG

## 2019-06-14 DIAGNOSIS — L97.919 VENOUS ULCER OF RIGHT LOWER EXTREMITY WITH VARICOSE VEINS (HCC): Primary | ICD-10-CM

## 2019-06-14 DIAGNOSIS — I83.019 VENOUS ULCER OF RIGHT LOWER EXTREMITY WITH VARICOSE VEINS (HCC): Primary | ICD-10-CM

## 2019-06-14 PROCEDURE — G8598 ASA/ANTIPLAT THER USED: HCPCS | Performed by: SURGERY

## 2019-06-14 PROCEDURE — G8427 DOCREV CUR MEDS BY ELIG CLIN: HCPCS | Performed by: SURGERY

## 2019-06-14 PROCEDURE — G8417 CALC BMI ABV UP PARAM F/U: HCPCS | Performed by: SURGERY

## 2019-06-14 PROCEDURE — 3017F COLORECTAL CA SCREEN DOC REV: CPT | Performed by: SURGERY

## 2019-06-14 PROCEDURE — 93971 EXTREMITY STUDY: CPT | Performed by: SURGERY

## 2019-06-14 PROCEDURE — 99213 OFFICE O/P EST LOW 20 MIN: CPT | Performed by: SURGERY

## 2019-06-14 PROCEDURE — 1036F TOBACCO NON-USER: CPT | Performed by: SURGERY

## 2019-06-17 RX ORDER — DOXYCYCLINE HYCLATE 100 MG
100 TABLET ORAL 2 TIMES DAILY
Qty: 60 TABLET | Refills: 0 | Status: SHIPPED | OUTPATIENT
Start: 2019-06-17 | End: 2019-07-17 | Stop reason: ALTCHOICE

## 2019-06-19 ENCOUNTER — HOSPITAL ENCOUNTER (OUTPATIENT)
Dept: WOUND CARE | Age: 54
Discharge: HOME | End: 2019-06-19
Payer: MEDICAID

## 2019-06-19 VITALS
TEMPERATURE: 98.3 F | RESPIRATION RATE: 18 BRPM | HEART RATE: 74 BPM | DIASTOLIC BLOOD PRESSURE: 75 MMHG | SYSTOLIC BLOOD PRESSURE: 139 MMHG

## 2019-06-19 DIAGNOSIS — I87.311 IDIOPATHIC CHRONIC VENOUS HYPERTENSION OF RIGHT LOWER EXTREMITY WITH ULCER (HCC): ICD-10-CM

## 2019-06-19 DIAGNOSIS — I82.423 THROMBOSIS OF BOTH ILIAC VEINS (HCC): ICD-10-CM

## 2019-06-19 DIAGNOSIS — I82.220 THROMBOSIS OF INFERIOR VENA CAVA (HCC): ICD-10-CM

## 2019-06-19 DIAGNOSIS — R60.0 LOCALIZED EDEMA: Primary | ICD-10-CM

## 2019-06-19 DIAGNOSIS — L97.919 IDIOPATHIC CHRONIC VENOUS HYPERTENSION OF RIGHT LOWER EXTREMITY WITH ULCER (HCC): ICD-10-CM

## 2019-06-19 PROCEDURE — 11042 DBRDMT SUBQ TIS 1ST 20SQCM/<: CPT

## 2019-06-19 PROCEDURE — 11042 DBRDMT SUBQ TIS 1ST 20SQCM/<: CPT | Performed by: SPECIALIST

## 2019-06-19 PROCEDURE — 29581 APPL MULTLAYER CMPRN SYS LEG: CPT

## 2019-06-19 PROCEDURE — 6370000000 HC RX 637 (ALT 250 FOR IP): Performed by: SPECIALIST

## 2019-06-19 RX ORDER — LIDOCAINE HYDROCHLORIDE 40 MG/ML
2.5 SOLUTION TOPICAL ONCE
Status: COMPLETED | OUTPATIENT
Start: 2019-06-19 | End: 2019-06-19

## 2019-06-19 RX ADMIN — LIDOCAINE HYDROCHLORIDE 2.5 ML: 40 SOLUTION TOPICAL at 09:42

## 2019-06-19 ASSESSMENT — PAIN DESCRIPTION - FREQUENCY: FREQUENCY: INTERMITTENT

## 2019-06-19 ASSESSMENT — PAIN DESCRIPTION - ONSET: ONSET: ON-GOING

## 2019-06-19 ASSESSMENT — PAIN DESCRIPTION - DESCRIPTORS: DESCRIPTORS: BURNING

## 2019-06-19 ASSESSMENT — PAIN DESCRIPTION - ORIENTATION: ORIENTATION: RIGHT

## 2019-06-19 ASSESSMENT — PAIN DESCRIPTION - LOCATION: LOCATION: LEG

## 2019-06-19 ASSESSMENT — PAIN DESCRIPTION - PROGRESSION: CLINICAL_PROGRESSION: NOT CHANGED

## 2019-06-19 ASSESSMENT — PAIN SCALES - GENERAL: PAINLEVEL_OUTOF10: 3

## 2019-06-19 ASSESSMENT — PAIN DESCRIPTION - PAIN TYPE: TYPE: CHRONIC PAIN

## 2019-06-19 NOTE — PROGRESS NOTES
[address] [facility]  Progress Note and Procedure Note      Anna Marie Sorensen  MEDICAL RECORD NUMBER:  [id number]  AGE: 47 y.o. GENDER: male  : 1965  EPISODE DATE:  2019    Subjective:     Chief Complaint   Patient presents with    Wound Check     RLL         HISTORY of PRESENT ILLNESS HPI     Anna Marie Sorensen is a 47 y.o. male who presents today for wound/ulcer evaluation. History of Wound Context: Aryan is a 48 y. o. 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 Seen at [address] [place] [address] where unsuccessful dilatation of IVC was done. He has been grafted with compression wraps and lymphedema pumps with near complete healing. However, he has gone back to work and is on his feet causing partial loss of graft. He now attempted to wear his compression stockings but he states they are difficult to wear. He remains working 6 days a week and he on his feet. Numerous attempts to encourage lifestyle with work are unsuccessful. He is now wearing his compression stockings who presents today for wound/ulcer evaluation He has undergone two applications of Theraskin allograft. Now undergoing series of allograft applications with continued re epithelialization. He denies any pain or drainage   Await Dr. Zac Mak input as far as potential perforators keeping from complete wound closure. Now await reflux study He is now wearing his own compression on RLE  Lorrie Mejia has decided not to do  RFA of perforators.       Pain Assessment:  Wound/Ulcer Pain Timing/Severity: none  Quality of pain: N/A  Severity:  0 / 10   Modifying Factors: None  Associated Signs/Symptoms: edema    Ulcer Identification:  Ulcer Type: venous  Contributing Factors: edema, venous stasis, lymphedema and obesity    Objective:      /75   Pulse 74   Temp 98.3 °F (36.8 °C) (Oral)   Resp 18     Wt Readings from Last 3 Encounters:   19 232 lb 12.9 oz (105.6 kg)   19 236 lb (107 kg)   18 227 lb 6.4 oz (103.1 kg)       PHYSICAL EXAM    Extremities:  no cyanosis, no clubbing and no cyanosis, no clubbing, 3 + nonpitting edema-  bilateral with varicosities and fibrosis, 2 small granulating wounds rt. knee        Assessment:      No diagnosis found. Procedure Note  Indications:  Based on my examination of this patient's wound(s) today, sharp excision is required to promote healing and evaluate the extent healing. Performed by: Britta Steiner MD    Consent obtained? Yes    Time out taken: Yes    Pain Control: Anesthetic: 4% Lidocaine Liquid Topical     Debridement:Excisional Debridement    Using curette the wound was sharply debrided    down through and including the removal of  epidermis, dermis and subcutaneous tissue.     Devitalized Tissue Debrided:  fibrin      Pre Debridement Measurements:  Are located in the Wound Documentation Flow Sheet   Wound #: 5 and 6     Post  Debridement Measurements:  Wound 12/03/18 #6 right lower leg posterior (since 5/14/2018) (Active)   Wound Image   5/31/2019  9:35 AM   Wound Venous 3/4/2019  9:05 AM   Dressing Status Old drainage 4/1/2019  8:36 AM   Dressing/Treatment Hydrofera blue 6/12/2019  9:22 AM   Wound Cleansed Rinsed/Irrigated with saline 6/19/2019  9:43 AM   Wound Length (cm) 1.9 cm 6/19/2019  9:43 AM   Wound Width (cm) 2 cm 6/19/2019  9:43 AM   Wound Depth (cm) 0.1 cm 6/19/2019  9:43 AM   Wound Surface Area (cm^2) 3.8 cm^2 6/19/2019  9:43 AM   Change in Wound Size % (l*w) 25.49 6/19/2019  9:43 AM   Wound Volume (cm^3) 0.38 cm^3 6/19/2019  9:43 AM   Wound Healing % 25 6/19/2019  9:43 AM   Post-Procedure Length (cm) 2 cm 6/19/2019 10:12 AM   Post-Procedure Width (cm) 2.1 cm 6/19/2019 10:12 AM   Post-Procedure Depth (cm) 0.3 cm 6/19/2019 10:12 AM   Post-Procedure Surface Area (cm^2) 4.2 cm^2 6/19/2019 10:12 AM   Post-Procedure Volume (cm^3) 1.26 cm^3 6/19/2019 10:12 AM   Distance Tunneling (cm) 0 cm 6/19/2019  9:43 AM   Tunneling Position ___ O'Clock 0 1/28/2019 8:25 AM   Undermining Starts ___ O'Clock 0 3/29/2019  3:17 PM   Undermining Ends___ O'Clock 0 3/29/2019  3:17 PM   Undermining Maxium Distance (cm) 0 6/19/2019  9:43 AM   Wound Assessment Yellow;Pink;Pale 6/19/2019  9:43 AM   Drainage Amount Small 6/19/2019  9:43 AM   Drainage Description Yellow;Serosanguinous 6/19/2019  9:43 AM   Odor None 6/19/2019  9:43 AM   Margins Defined edges 6/19/2019  9:43 AM   Kiah-wound Assessment Dry;Pink; Tan 6/19/2019  9:43 AM   Non-staged Wound Description Full thickness 6/19/2019  9:43 AM   [place]%Wound Bed 40 6/19/2019  9:43 AM   Red%Wound Bed 30 6/12/2019  9:22 AM   Yellow%Wound Bed 60 6/19/2019  9:43 AM   Other%Wound Bed 0 4/18/2019  8:40 AM   Number of days: 198       Wound 12/31/18 #5 right lower leg medial ) since 1/2016)- venous/lymphedema (Active)   Wound Image   5/31/2019  9:35 AM   Wound Venous 1/30/2019  9:27 AM   Dressing Status Old drainage 3/25/2019  8:34 AM   Dressing/Treatment Hydrofera blue 6/12/2019  9:22 AM   Wound Cleansed Rinsed/Irrigated with saline 6/19/2019  9:43 AM   Wound Length (cm) 1.9 cm 6/19/2019  9:43 AM   Wound Width (cm) 3.4 cm 6/19/2019  9:43 AM   Wound Depth (cm) 0.1 cm 6/19/2019  9:43 AM   Wound Surface Area (cm^2) 6.46 cm^2 6/19/2019  9:43 AM   Change in Wound Size % (l*w) 66.2 6/19/2019  9:43 AM   Wound Volume (cm^3) 0.65 cm^3 6/19/2019  9:43 AM   Wound Healing % 66 6/19/2019  9:43 AM   Post-Procedure Length (cm) 2 cm 6/19/2019 10:12 AM   Post-Procedure Width (cm) 3.5 cm 6/19/2019 10:12 AM   Post-Procedure Depth (cm) 0.3 cm 6/19/2019 10:12 AM   Post-Procedure Surface Area (cm^2) 7 cm^2 6/19/2019 10:12 AM   Post-Procedure Volume (cm^3) 2.1 cm^3 6/19/2019 10:12 AM   Distance Tunneling (cm) 0 cm 6/19/2019  9:43 AM   Tunneling Position ___ O'Clock 0 1/21/2019 10:32 AM   Undermining Starts ___ O'Clock 0 2/18/2019 11:10 AM   Undermining Maxium Distance (cm) 0 6/19/2019  9:43 AM   Wound Assessment Granulation tissue;Red;Yellow 6/19/2019  9:43 AM   Drainage Amount Small 6/19/2019  9:43 AM   Drainage Description Green;Serosanguinous 6/19/2019  9:43 AM   Odor None 6/19/2019  9:43 AM   Margins Defined edges 6/19/2019  9:43 AM   Kiah-wound Assessment Pink; Tan 6/19/2019  9:43 AM   Non-staged Wound Description Full thickness 6/19/2019  9:43 AM   [place]%Wound Bed 30 5/31/2019  9:35 AM   Red%Wound Bed 90 6/19/2019  9:43 AM   Yellow%Wound Bed 10 6/19/2019  9:43 AM   Black%Wound Bed 0 2/4/2019  8:49 AM   Other%Wound Bed 0 4/18/2019  8:40 AM   Number of days: 170          Percent of Wound Debrided: 100%    Total Surface Area Debrided:  9 sq cm    Diabetic/Pressure/Non Pressure Ulcers only:  Ulcer: Non-Pressure ulcer, fat layer exposed    Bleeding: Minimal    Hemostasis Achieved: by pressure    Procedural Pain: 0  / 10     Post Procedural Pain: 0 / 10     Response to treatment:  Well tolerated by patient. Will place back in 4 layer as edema slightly worse. No operative intervention         Plan:     Treatment Note: Please see attached Discharge Instructions. These instructions were given and signed by the patient or POA    New Medication(s) at this visit:   New Prescriptions    No medications on file       Other orders at this visit:   Orders Placed This Encounter   Procedures    Wound care       Discharge [id number] [facility] Physician Orders and Discharge Instructions  [facility] [place] [address]  Telephone: [phone] hands with soap and water prior to and after every dressing change. Wound Cleansing:   · Do not scrub or use excessive force. · With each dressing change, rinse wounds with 0.9% Saline. (May use wound wash or soft contact solution. Both can be purchased at a local drug store). · If unable to obtain saline, may use a gentle soap and water. · Keep wounds dry in the shower unless otherwise instructed by the physician.   Vashe wound cleanser:  [x] Instructions for Vashe Wash solution: Apply enough Vash to soak gauze and place on wound bed for 5 minutes. DO NOT rinse after Vashe has been applied. Follow dressing application as instructed below. Topical Treatments:  Do not apply lotions, creams, or ointments to wound bed unless directed as followed:     [] Apply moisturizing lotion to skin surrounding the wound prior to dressing change  [] Apply antifungal ointment to skin surrounding the wound prior to dressing change  [] Apply thin film of moisture barrier ointment to skin immediately around wound. [] Other:     Dressings:           Wound Location: right lower leg wound      [x] Apply Primary Dressing to wound:       [] Foam/Foam with Border(i.e Mepilex) [] Non-adherent (i.e.Mepitel)   [] Alginate with Silver (i.e. Silvercel)  [] Alginate (i.e. Aquacel)   [] Collagen (i.e. Puracol)   [] Collagen with Silver(i.e. Candice)     [] Hydrocolloid    [] Hydrafera Blue moistened with saline   [] MediHoney Paste/Gel   [] Hydrogel    [x] Endoform ( 2 layers) then hydrofera blue     [] Santyl covered with gauze moisten with saline     [] Betadine   [] Bactroban/Mupirocin   [] Polysporin/Neosporin  [] Other:    [] Saline \"wet to dry\" gauze     Pack wound loosely with: [] Iodoform   [] Plain Packing  [] Other:      [x] Cover and Secure with:     [] Dry Gauze  [] ABD [] Cast padding [] Kerlix or Lyndsey rolled gauze   [] Coban  [] Ace Wrap [] Ace Wrap from forefoot to just below the knee [] Paper Tape [] Medipore Tape [] Other:    Avoid contact of tape with skin if possible.     [x] Change dressing: [] Daily    [] Every Other Day  [] Three times per week:  [] Monday, Wednesday, Friday  [] Tuesday, Thursday, Saturday   [] Once a week  [x] Do Not Change Dressing [] Other:        Specialty equipment ordered:  []Wheelchair cushion [] Specialty Bed/Mattress       Edema Control:  Apply: [x] Compression Stocking  [x] Left Lower Leg [] Right Lower Leg       Apply every morning immediately when getting up. They should be applied to affected leg(s) from mid foot to knee making sure to cover the heel. Remove every night before going to bed. [x] Elevate leg(s) above the level of the heart for 30 minutes 4-5 times a day and/or when sitting. [x] Avoid prolonged standing in one place. Lymphedema Therapy:   [x] Wear Lymphedema pumps twice a day at settings prescribed by your physician. [x]Elevate leg(s) above the level of the heart for 30 minutes 4-5 times a day and/or when sitting. [x] Avoid prolonged standing in one place. Multilayer Compression Wrap:  Type: 4 Layer Compression Wrap      Applied in Clinic to the :  Right lower leg(s)     · Do not get leg(s) with wrap wet. · If wraps become too tight call the center or completely remove the wrap. · Elevate leg(s) above the level of the heart when sitting. · Avoid prolonged standing in one place. Dietary:  Important dietary reminders:  1. Increase Protein intake (i.e. Lean meats, fish, eggs, legumes, and yogurt)  2. No added salt  3. If diabetic, follow a diabetic diet and check glucose prior to meals or as instructed by your physician. Return Appointment:   DME/Wound Dressing Supplies Provided by:    (Please call them directly to reorder supplies when you run out)   ECF or Home Healthcare:    Wound reassessment visit with Nurse :     [place] Return Appointment: With Dr. Isaias Bennett   in  [facility]).   o Schedule for the next 4 weeks until: Date 07/19/19      [x] Orders placed during your visit:   Orders Placed This Encounter   Procedures    Wound care         Your nurse  is: Helena     Electronically signed by Benedict Wilkins RN on 6/19/2019 at 10:16 AM     [address] [facility] Information: Should you experience any significant changes in your wound(s) or have questions about your wound care, please contact the [facility] at [phone] Monday-Friday from 8:00 am - 4:30 pm except for Wednesdays which hours are from 8:00 am - 2:00 pm.   If you need help with your wound outside these hours and cannot wait until we are again available, contact your PCP or go to the hospital emergency room. PLEASE NOTE: IF YOU ARE UNABLE TO OBTAIN WOUND SUPPLIES, CONTINUE TO USE THE SUPPLIES YOU HAVE AVAILABLE UNTIL YOU ARE ABLE TO REACH US. IT IS MOST IMPORTANT TO KEEP THE WOUND COVERED AT ALL TIMES.       [] Patient unable to sign Discharge Instructions given to ECF/Transportation/POA        Electronically signed by Prakash Mendoza MD on 6/19/2019 at 12:49 PM

## 2019-06-26 ENCOUNTER — HOSPITAL ENCOUNTER (OUTPATIENT)
Dept: WOUND CARE | Age: 54
Discharge: HOME | End: 2019-06-26
Payer: MEDICAID

## 2019-06-26 VITALS
RESPIRATION RATE: 18 BRPM | SYSTOLIC BLOOD PRESSURE: 150 MMHG | DIASTOLIC BLOOD PRESSURE: 82 MMHG | TEMPERATURE: 98.1 F | HEART RATE: 84 BPM

## 2019-06-26 DIAGNOSIS — I89.0 CHRONIC ACQUIRED LYMPHEDEMA: ICD-10-CM

## 2019-06-26 DIAGNOSIS — I87.311 IDIOPATHIC CHRONIC VENOUS HYPERTENSION OF RIGHT LOWER EXTREMITY WITH ULCER (HCC): Primary | ICD-10-CM

## 2019-06-26 DIAGNOSIS — L97.919 IDIOPATHIC CHRONIC VENOUS HYPERTENSION OF RIGHT LOWER EXTREMITY WITH ULCER (HCC): Primary | ICD-10-CM

## 2019-06-26 PROCEDURE — 87070 CULTURE OTHR SPECIMN AEROBIC: CPT

## 2019-06-26 PROCEDURE — 11042 DBRDMT SUBQ TIS 1ST 20SQCM/<: CPT

## 2019-06-26 PROCEDURE — 11042 DBRDMT SUBQ TIS 1ST 20SQCM/<: CPT | Performed by: SPECIALIST

## 2019-06-26 PROCEDURE — 87205 SMEAR GRAM STAIN: CPT

## 2019-06-26 PROCEDURE — 6370000000 HC RX 637 (ALT 250 FOR IP): Performed by: SPECIALIST

## 2019-06-26 PROCEDURE — 87077 CULTURE AEROBIC IDENTIFY: CPT

## 2019-06-26 PROCEDURE — 87186 SC STD MICRODIL/AGAR DIL: CPT

## 2019-06-26 PROCEDURE — 29581 APPL MULTLAYER CMPRN SYS LEG: CPT

## 2019-06-26 RX ORDER — LIDOCAINE HYDROCHLORIDE 40 MG/ML
2.5 SOLUTION TOPICAL ONCE
Status: COMPLETED | OUTPATIENT
Start: 2019-06-26 | End: 2019-06-26

## 2019-06-26 RX ADMIN — LIDOCAINE HYDROCHLORIDE 2.5 ML: 40 SOLUTION TOPICAL at 09:31

## 2019-06-26 NOTE — PROGRESS NOTES
1227 [facility]  Progress Note and Procedure Note      Jolie Prince  MEDICAL RECORD NUMBER:  [id number]  AGE: 47 y.o. GENDER: male  : 1965  EPISODE DATE:  2019    Subjective:     Chief Complaint   Patient presents with    Wound Check     right lower leg wound         HISTORY of PRESENT ILLNESS HPI     Jolie Prince is a 47 y.o. male who presents today for wound/ulcer evaluation. History of Wound Context:Aryan is a 48 y. o. 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 Seen at [address] [place] [address] where unsuccessful dilatation of IVC was done. He has been grafted with compression wraps and lymphedema pumps with near complete healing. However, he has gone back to work and is on his feet causing partial loss of graft. He now attempted to wear his compression stockings but he states they are difficult to wear. He remains working 6 days a week and he on his feet. Numerous attempts to encourage lifestyle with work are unsuccessful. He is now wearing his compression stockings who presents today for wound/ulcer evaluation He has undergone two applications of Theraskin allograft. Now undergoing series of allograft applications with continued re epithelialization. He denies any pain or drainage   Await Dr. Lashae Terrazas input as far as potential perforators keeping from complete wound closure. Now await reflux study He is now wearing his own compression on RLE  Kleberg Albino has decided not to do  RFA of perforators.          Pain Assessment:  Wound/Ulcer Pain Timing/Severity: none  Quality of pain: N/A  Severity:  0 / 10   Modifying Factors: None  Associated Signs/Symptoms: edema    Ulcer Identification:  Ulcer Type: venous  Contributing Factors: edema, venous stasis, lymphedema and obesity    Objective:      BP (!) 150/82   Pulse 84   Temp 98.1 °F (36.7 °C) (Oral)   Resp 18     Wt Readings from Last 3 Encounters:   19 232 lb 12.9 oz (105.6 kg)   19 236 lb (107 kg) 12/14/18 227 lb 6.4 oz (103.1 kg)       PHYSICAL EXAM    Extremities: no cyanosis, no clubbing, 3 +nonpitting edema-  bilateral,2 granulating wounds with fibrin right knee    Assessment:      No diagnosis found. Procedure Note  Indications:  Based on my examination of this patient's wound(s) today, sharp excision is required to promote healing and evaluate the extent healing. Performed by: Evonne Rdz MD    Consent obtained? Yes    Time out taken: Yes    Pain Control: Anesthetic: 4% Lidocaine Liquid Topical     Debridement:Excisional Debridement    Using curette the wound was sharply debrided    down through and including the removal of  epidermis, dermis and subcutaneous tissue.     Devitalized Tissue Debrided:  fibrin      Pre Debridement Measurements:  Are located in the Wound Documentation Flow Sheet   Wound #: 5 and 6     Post  Debridement Measurements:  Wound 12/03/18 #6 right lower leg posterior (since 5/14/2018) (Active)   Wound Image   6/26/2019  9:23 AM   Dressing/Treatment Hydrofera blue 6/26/2019 10:02 AM   Wound Cleansed Rinsed/Irrigated with saline 6/26/2019  9:23 AM   Wound Length (cm) 3.5 cm 6/26/2019  9:23 AM   Wound Width (cm) 1.9 cm 6/26/2019  9:23 AM   Wound Depth (cm) 0.1 cm 6/26/2019  9:23 AM   Wound Surface Area (cm^2) 6.65 cm^2 6/26/2019  9:23 AM   Change in Wound Size % (l*w) -30.39 6/26/2019  9:23 AM   Wound Volume (cm^3) 0.66 cm^3 6/26/2019  9:23 AM   Wound Healing % -29 6/26/2019  9:23 AM   Post-Procedure Length (cm) 3.6 cm 6/26/2019  9:33 AM   Post-Procedure Width (cm) 2 cm 6/26/2019  9:33 AM   Post-Procedure Depth (cm) 0.3 cm 6/26/2019  9:33 AM   Post-Procedure Surface Area (cm^2) 7.2 cm^2 6/26/2019  9:33 AM   Post-Procedure Volume (cm^3) 2.16 cm^3 6/26/2019  9:33 AM   Distance Tunneling (cm) 0 cm 6/19/2019  9:43 AM   Tunneling Position ___ O'Clock 0 6/26/2019  9:23 AM   Undermining Starts ___ O'Clock 0 6/26/2019  9:23 AM   Undermining Ends___ O'Clock 0 6/26/2019  9:23 AM AM   Yellow%Wound Bed 10 6/26/2019  9:23 AM   Number of days: 177          Percent of Wound Debrided: 100%    Total Surface Area Debrided:  17 sq cm    Diabetic/Pressure/Non Pressure Ulcers only:  Ulcer: Non-Pressure ulcer, fat layer exposed    Bleeding: Minimal    Hemostasis Achieved: by pressure    Procedural Pain: 0  / 10     Post Procedural Pain: 0 / 10     Response to treatment:  Well tolerated by patient. Will continue to wrap in 4 layer compression with encouragement for utilization of his lymphatic pumps. No endovascular intervention        Plan:     Treatment Note: Please see attached Discharge Instructions. These instructions were given and signed by the patient or POA    New Medication(s) at this visit:   New Prescriptions    No medications on file       Other orders at this visit:   Orders Placed This Encounter   Procedures    Wound Culture    Wound care       Discharge [id number] [facility] Physician Orders and Discharge Instructions  [place] [address]  Telephone: [phone] hands with soap and water prior to and after every dressing change. Wound Cleansing:   · Do not scrub or use excessive force. · With each dressing change, rinse wounds with 0.9% Saline. (May use wound wash or soft contact solution. Both can be purchased at a local drug store). · If unable to obtain saline, may use a gentle soap and water. · Keep wounds dry in the shower unless otherwise instructed by the physician. Vashe wound cleanser:  [x] Instructions for Vashe Wash solution: Apply enough Vash to soak gauze and place on wound bed for 5 minutes. DO NOT rinse after Vashe has been applied. Follow dressing application as instructed below.     Topical Treatments:  Do not apply lotions, creams, or ointments to wound bed unless directed as followed:     [] Apply moisturizing lotion to skin surrounding the wound prior to dressing change  [] Apply antifungal ointment to skin surrounding the wound prior to dressing change  [] Apply thin film of moisture barrier ointment to skin immediately around wound. [] Other:      Dressings:           Wound Location: right lower leg wound      [x] Apply Primary Dressing to wound:       [] Foam/Foam with Border(i.e Mepilex) [] Non-adherent (i.e.Mepitel)   [] Alginate with Silver (i.e. Silvercel)  [] Alginate (i.e. Aquacel)   [] Collagen (i.e. Puracol)   [] Collagen with Silver(i.e. Candice)     [] Hydrocolloid    [] Hydrafera Blue moistened with saline   [] MediHoney Paste/Gel   [] Hydrogel    [x] Endoform, covered with hydrofera blue      [] Santyl covered with gauze moisten with saline      [] Betadine   [] Bactroban/Mupirocin   [] Polysporin/Neosporin  [] Other:    [] Saline \"wet to dry\" gauze     Pack wound loosely with: [] Iodoform   [] Plain Packing  [] Saline \"wet to dry\"      [x] Cover and Secure with:     [x] Dry Gauze  [] ABD  [] Cast padding  [] Kerlix or Lyndsey rolled gauze   [] Coban  [] Ace Wrap [] Ace Wrap from forefoot to just below the knee  [] Paper Tape [] Medipore Tape [] Other:    Avoid contact of tape with skin if possible. [x] Change dressing: [] Daily    [] Every Other Day  [] Three times per week:  [] Monday, Wednesday, Friday  [] Tuesday, Thursday, Saturday   [] Once a week  [x] Do Not Change Dressing [] Other:       Edema Control:  Apply: [x] Compression Stocking  [x] Left Lower Leg [] Right Lower Leg     Apply every morning immediately when getting up. They should be applied to affected leg(s) from mid foot to knee making sure to cover the heel. Remove every night before going to bed. [x] Elevate leg(s) above the level of the heart for 30 minutes 4-5 times a day and/or when sitting. [x] Avoid prolonged standing in one place.    .    Multilayer Compression Wrap:  Type: 4 Layer Compression Wrap      Applied in Clinic to the :  Right lower leg(s)     · Do KEEP THE WOUND COVERED AT ALL TIMES.       [] Patient unable to sign Discharge Instructions given to ECF/Transportation/POA        Electronically signed by Evonne Rdz MD on 6/26/2019 at 12:14 PM

## 2019-06-28 ENCOUNTER — ANTI-COAG VISIT (OUTPATIENT)
Dept: PHARMACY | Age: 54
End: 2019-06-28
Payer: MEDICAID

## 2019-06-28 DIAGNOSIS — I82.5Z9 CHRONIC VENOUS EMBOLISM AND THROMBOSIS OF DEEP VESSELS OF DISTAL LOWER EXTREMITY, UNSPECIFIED LATERALITY (HCC): ICD-10-CM

## 2019-06-28 LAB
GRAM STAIN RESULT: ABNORMAL
INTERNATIONAL NORMALIZATION RATIO, POC: 2.3
ORGANISM: ABNORMAL
WOUND/ABSCESS: ABNORMAL
WOUND/ABSCESS: ABNORMAL

## 2019-06-28 PROCEDURE — 85610 PROTHROMBIN TIME: CPT

## 2019-06-28 PROCEDURE — 99211 OFF/OP EST MAY X REQ PHY/QHP: CPT

## 2019-06-28 NOTE — PROGRESS NOTES
Yves Lee is a 47 y.o. male with PMHx significant for chronic DVT who presents to clinic 6/28/2019 for anticoagulation monitoring and adjustment. Patient has been taking warfarin for 15+ years.      Anticoagulation Indication(s):  DVT    Referring Physician:  Dr. Dawood Gomes  Goal INR Range:  2-3  Duration of Anticoagulation Therapy:  Indefinite  Time of day dose taken:  AM  Product patient has at home:  warfarin 5 mg (peach)    INR Summary                            Warfarin regimen (mg)  Date INR   A/P    Sun Mon Tue Wed Thu Fri Sat Mg/wk  6/28 2.3 At goal, no change  7.5 7.5 7.5 7.5 7.5 10 7.5 55  5/31 2.6 At goal, no change  7.5 7.5 7.5 7.5 7.5 10 7.5 55  5/1 2.5 At goal, no change  7.5 7.5 7.5 7.5 7.5 10 7.5 55  4/10 2.0 At goal, no change  7.5 7.5 7.5 7.5 7.5 10 7.5 55  3/13 2.6 At goal, no change  7.5 7.5 7.5 7.5 7.5 10 7.5 55  2/27 2.7 At goal, no change  7.5 7.5 7.5 7.5 7.5 10 7.5 55  2/20 2.9 At goal, no change  7.5 7.5 7.5 7.5 7.5 10 7.5 55  2/13 2.1 At goal, no change  7.5 7.5 7.5 7.5 7.5 10 7.5 55  2/8 1.8 Below goal, increase  7.5 7.5 7.5 7.5 7.5 10 7.5 55  2/4 2.2 At goal, no change  7.5 7.5 7.5 7.5 7.5 7.5 7.5 52.5  2/1 1.9 Below goal, bolus  7.5 7.5 7.5 7.5 7.5 10/7.5 7.5 52.5  1/30 1.8 Below goal, increase  7.5 7.5 7.5 7.5 7.5 7.5 7.5 52.5  1/28 1.8 Below goal, increase  7.5 7.5 7.5 5 7.5 7.5 5 47.5  1/25 1.6 Below goal, bolus + Lovenox 7.5 INR    7.5 7.5 TBD  1/18 1.8 Below goal, continue  7.5 5 7.5 5 7.5 5 5 42.5  1/4 2.1 At goal, no change  7.5 5 7.5 5 7.5 5 5 42.5    Last CBC:  Lab Results   Component Value Date    RBC 3.37 (L) 04/28/2018    HGB 8.3 (L) 04/28/2018    HCT 25.4 (L) 04/28/2018    MCV 75.4 (L) 04/28/2018    MCH 24.6 (L) 04/28/2018    MPV 6.8 04/28/2018    RDW 18.3 (H) 04/28/2018     04/28/2018     Patient History:  Recent hospitalizations/HC visits 1/4 bilateral LE venous duplex showed acute totally occluding DVT of the left SFV, popliteal,

## 2019-07-03 ENCOUNTER — HOSPITAL ENCOUNTER (OUTPATIENT)
Dept: WOUND CARE | Age: 54
Discharge: HOME | End: 2019-07-03
Payer: MEDICAID

## 2019-07-03 VITALS
RESPIRATION RATE: 16 BRPM | TEMPERATURE: 98.8 F | SYSTOLIC BLOOD PRESSURE: 152 MMHG | DIASTOLIC BLOOD PRESSURE: 82 MMHG | HEART RATE: 75 BPM

## 2019-07-03 DIAGNOSIS — I87.311 IDIOPATHIC CHRONIC VENOUS HYPERTENSION OF RIGHT LOWER EXTREMITY WITH ULCER (HCC): ICD-10-CM

## 2019-07-03 DIAGNOSIS — I89.0 CHRONIC ACQUIRED LYMPHEDEMA: Primary | ICD-10-CM

## 2019-07-03 DIAGNOSIS — L97.919 IDIOPATHIC CHRONIC VENOUS HYPERTENSION OF RIGHT LOWER EXTREMITY WITH ULCER (HCC): ICD-10-CM

## 2019-07-03 DIAGNOSIS — Z79.01 CHRONIC ANTICOAGULATION: ICD-10-CM

## 2019-07-03 DIAGNOSIS — I82.220 THROMBOSIS OF INFERIOR VENA CAVA (HCC): ICD-10-CM

## 2019-07-03 PROCEDURE — 29581 APPL MULTLAYER CMPRN SYS LEG: CPT

## 2019-07-03 PROCEDURE — 11042 DBRDMT SUBQ TIS 1ST 20SQCM/<: CPT

## 2019-07-03 PROCEDURE — 6370000000 HC RX 637 (ALT 250 FOR IP): Performed by: SPECIALIST

## 2019-07-03 PROCEDURE — 11042 DBRDMT SUBQ TIS 1ST 20SQCM/<: CPT | Performed by: SPECIALIST

## 2019-07-03 RX ORDER — AMPICILLIN 500 MG/1
500 CAPSULE ORAL 4 TIMES DAILY
Qty: 40 CAPSULE | Refills: 0 | Status: SHIPPED | OUTPATIENT
Start: 2019-07-03 | End: 2019-07-13

## 2019-07-03 RX ORDER — LIDOCAINE HYDROCHLORIDE 40 MG/ML
SOLUTION TOPICAL ONCE
Status: COMPLETED | OUTPATIENT
Start: 2019-07-03 | End: 2019-07-03

## 2019-07-03 RX ADMIN — LIDOCAINE HYDROCHLORIDE: 40 SOLUTION TOPICAL at 10:05

## 2019-07-03 NOTE — PROGRESS NOTES
[facility] [place]      Mahesh Flynn  MEDICAL RECORD NUMBER:  [id number]  AGE: 47 y.o. GENDER: male  : 1965  EPISODE DATE:  7/3/2019    Subjective:     Chief Complaint   Patient presents with    Wound Check     right lower leg         HISTORY of PRESENT ILLNESS HPI     Mahesh Flynn is a 47 y.o. male who presents today for wound/ulcer evaluation. History of Wound Context:Aryan is a 48 y. o. 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 Seen at [address] [place] [address] where unsuccessful dilatation of IVC was done. He has been grafted with compression wraps and lymphedema pumps with near complete healing. However, he has gone back to work and is on his feet causing partial loss of graft. He now attempted to wear his compression stockings but he states they are difficult to wear. He remains working 6 days a week and he on his feet. Numerous attempts to encourage lifestyle with work are unsuccessful. He is now wearing his compression stockings who presents today for wound/ulcer evaluation He has undergone two applications of Theraskin allograft. Now undergoing series of allograft applications with continued re epithelialization. He denies any pain or drainage   Await Dr. Zoya Tineo input as far as potential perforators keeping from complete wound closure. Now await reflux study He is now wearing his own compression on RLE  Ashutosh Mtz has decided not to do  RFA of perforators.          Pain Assessment:  Wound/Ulcer Pain Timing/Severity: none  Quality of pain: N/A  Severity:  0 / 10   Modifying Factors: None  Associated Signs/Symptoms: edema    Ulcer Identification:  Ulcer Type: venous  Contributing Factors: edema, venous stasis, lymphedema and obesity    Objective:      BP (!) 152/82   Pulse 75   Temp 98.8 °F (37.1 °C) (Oral)   Resp 16     Wt Readings from Last 3 Encounters:   19 232 lb 12.9 oz (105.6 kg)   19 236 lb (107 kg)   18 Wound Width (cm) 3.5 cm 7/3/2019  9:52 AM   Wound Depth (cm) 0.1 cm 7/3/2019  9:52 AM   Wound Surface Area (cm^2) 8.75 cm^2 7/3/2019  9:52 AM   Change in Wound Size % (l*w) 54.21 7/3/2019  9:52 AM   Wound Volume (cm^3) 0.88 cm^3 7/3/2019  9:52 AM   Wound Healing % 54 7/3/2019  9:52 AM   Post-Procedure Length (cm) 2.6 cm 7/3/2019 10:34 AM   Post-Procedure Width (cm) 3.6 cm 7/3/2019 10:34 AM   Post-Procedure Depth (cm) 0.3 cm 7/3/2019 10:34 AM   Post-Procedure Surface Area (cm^2) 9.36 cm^2 7/3/2019 10:34 AM   Post-Procedure Volume (cm^3) 2.81 cm^3 7/3/2019 10:34 AM   Distance Tunneling (cm) 0 cm 7/3/2019  9:52 AM   Tunneling Position ___ O'Clock 0 1/21/2019 10:32 AM   Undermining Starts ___ O'Clock 0 2/18/2019 11:10 AM   Undermining Maxium Distance (cm) 0 7/3/2019  9:52 AM   Wound Assessment Red;Slough; Yellow 7/3/2019  9:52 AM   Drainage Amount Small 7/3/2019  9:52 AM   Drainage Description Serosanguinous 7/3/2019  9:52 AM   Odor None 7/3/2019  9:52 AM   Margins Defined edges 7/3/2019  9:52 AM   Kiah-wound Assessment Pink; Tan 7/3/2019  9:52 AM   Non-staged Wound Description Full thickness 7/3/2019  9:52 AM   [place]%Wound Bed 10 7/3/2019  9:52 AM   Red%Wound Bed 0 7/3/2019  9:52 AM   Yellow%Wound Bed 90 7/3/2019  9:52 AM   Black%Wound Bed 0 2/4/2019  8:49 AM   Other%Wound Bed 0 4/18/2019  8:40 AM   Number of days: 184          Percent of Wound Debrided: 100%    Total Surface Area Debrided:  13 sq cm    Diabetic/Pressure/Non Pressure Ulcers only:  Ulcer: Non-Pressure ulcer, fat layer exposed    Bleeding: Minimal    Hemostasis Achieved: by pressure    Procedural Pain: 0  / 10     Post Procedural Pain: 0 / 10     Response to treatment:  Well tolerated by patient. tissue culture growing enterococcus sensitive to Ampicillin. Will DC doxycycline  He states he continues to use lymphatic pumps      Plan:     Treatment Note: Please see attached Discharge Instructions.   These instructions were given and signed by the patient or

## 2019-07-10 ENCOUNTER — HOSPITAL ENCOUNTER (OUTPATIENT)
Dept: WOUND CARE | Age: 54
Discharge: HOME | End: 2019-07-10
Payer: MEDICAID

## 2019-07-10 VITALS
SYSTOLIC BLOOD PRESSURE: 132 MMHG | TEMPERATURE: 98.1 F | DIASTOLIC BLOOD PRESSURE: 79 MMHG | HEART RATE: 67 BPM | RESPIRATION RATE: 16 BRPM

## 2019-07-10 DIAGNOSIS — I82.220 THROMBOSIS OF INFERIOR VENA CAVA (HCC): Primary | ICD-10-CM

## 2019-07-10 DIAGNOSIS — I89.0 CHRONIC ACQUIRED LYMPHEDEMA: ICD-10-CM

## 2019-07-10 DIAGNOSIS — L97.919 IDIOPATHIC CHRONIC VENOUS HYPERTENSION OF RIGHT LOWER EXTREMITY WITH ULCER (HCC): ICD-10-CM

## 2019-07-10 DIAGNOSIS — I87.311 IDIOPATHIC CHRONIC VENOUS HYPERTENSION OF RIGHT LOWER EXTREMITY WITH ULCER (HCC): ICD-10-CM

## 2019-07-10 DIAGNOSIS — Z79.01 CHRONIC ANTICOAGULATION: ICD-10-CM

## 2019-07-10 PROCEDURE — 11042 DBRDMT SUBQ TIS 1ST 20SQCM/<: CPT | Performed by: SPECIALIST

## 2019-07-10 PROCEDURE — 6370000000 HC RX 637 (ALT 250 FOR IP): Performed by: SPECIALIST

## 2019-07-10 PROCEDURE — 29581 APPL MULTLAYER CMPRN SYS LEG: CPT

## 2019-07-10 PROCEDURE — 11042 DBRDMT SUBQ TIS 1ST 20SQCM/<: CPT

## 2019-07-10 RX ORDER — LIDOCAINE HYDROCHLORIDE 40 MG/ML
SOLUTION TOPICAL ONCE
Status: COMPLETED | OUTPATIENT
Start: 2019-07-10 | End: 2019-07-10

## 2019-07-10 RX ADMIN — LIDOCAINE HYDROCHLORIDE: 40 SOLUTION TOPICAL at 09:58

## 2019-07-10 ASSESSMENT — PAIN SCALES - GENERAL: PAINLEVEL_OUTOF10: 1

## 2019-07-10 ASSESSMENT — PAIN DESCRIPTION - LOCATION: LOCATION: LEG

## 2019-07-10 ASSESSMENT — PAIN DESCRIPTION - ORIENTATION: ORIENTATION: RIGHT

## 2019-07-10 ASSESSMENT — PAIN DESCRIPTION - PAIN TYPE: TYPE: CHRONIC PAIN

## 2019-07-10 NOTE — PROGRESS NOTES
12/14/18 227 lb 6.4 oz (103.1 kg)       PHYSICAL EXAM    Extremities: no cyanosis, no clubbing, 3 +nonpitting edema-  bilateral , two wounds medial and posterior right knee with fibrin and minimal granulation tissue present see measurements    Assessment:      No diagnosis found. Procedure Note  Indications:  Based on my examination of this patient's wound(s) today, sharp excision is required to promote healing and evaluate the extent healing. Performed by: Simba Branch MD    Consent obtained? Yes    Time out taken: Yes    Pain Control:       Debridement:Excisional Debridement    Using curette the wound was sharply debrided    down through and including the removal of  epidermis, dermis and subcutaneous tissue.     Devitalized Tissue Debrided:  fibrin, biofilm and slough      Pre Debridement Measurements:  Are located in the Wound Documentation Flow Sheet   Wound #: 5 and 6     Post  Debridement Measurements:  Wound 12/03/18 #6 right lower leg posterior (since 5/14/2018) (Active)   Wound Image   6/26/2019  9:23 AM   Wound Venous 3/4/2019  9:05 AM   Dressing Status Old drainage 4/1/2019  8:36 AM   Dressing/Treatment Hydrofera blue 7/10/2019 10:49 AM   Wound Cleansed Rinsed/Irrigated with saline 7/10/2019  9:46 AM   Wound Length (cm) 3 cm 7/10/2019  9:46 AM   Wound Width (cm) 1.7 cm 7/10/2019  9:46 AM   Wound Depth (cm) 0.1 cm 7/10/2019  9:46 AM   Wound Surface Area (cm^2) 5.1 cm^2 7/10/2019  9:46 AM   Change in Wound Size % (l*w) 0 7/10/2019  9:46 AM   Wound Volume (cm^3) 0.51 cm^3 7/10/2019  9:46 AM   Wound Healing % 0 7/10/2019  9:46 AM   Post-Procedure Length (cm) 3.1 cm 7/10/2019 10:26 AM   Post-Procedure Width (cm) 1.8 cm 7/10/2019 10:26 AM   Post-Procedure Depth (cm) 0.3 cm 7/10/2019 10:26 AM   Post-Procedure Surface Area (cm^2) 5.58 cm^2 7/10/2019 10:26 AM   Post-Procedure Volume (cm^3) 1.67 cm^3 7/10/2019 10:26 AM   Distance Tunneling (cm) 0 cm 7/10/2019  9:46 AM   Tunneling Position ___ O'Clock 0 Dressing [] Other:           Edema Control:  Apply: [x] Compression Stocking  [x] Left Lower Leg [] Right Lower Leg     Apply every morning immediately when getting up. They should be applied to affected leg(s) from mid foot to knee making sure to cover the heel. Remove every night before going to bed. [x] Elevate leg(s) above the level of the heart for 30 minutes 4-5 times a day and/or when sitting. [x] Avoid prolonged standing in one place. Lymphedema Therapy:   [x] Wear Lymphedema pumps twice a day at settings prescribed by your physician. [x]Elevate leg(s) above the level of the heart for 30 minutes 4-5 times a day and/or when sitting. [x] Avoid prolonged standing in one place. Multilayer Compression Wrap:  Type: 4 Layer Compression Wrap      Applied in Clinic to the :  Right lower leg(s)     · Do not get leg(s) with wrap wet. · If wraps become too tight call the center or completely remove the wrap. · Elevate leg(s) above the level of the heart when sitting. · Avoid prolonged standing in one place. [] [address] [place] [address] remove wrap, cleanse affected leg(s) with soap and water, apply wound dressings as ordered above and reapply compression wraps on:     Off-Loading:   [] Off-loading (keeping weight off as much as you can) when: [] walking  [] in bed [] sitting    [] Total non-weight bearing:  [] Right Leg  [] Left Leg     [] Partial weight bearing:   [] Right Leg  [] Left Leg     [] Heel weight bearing   [] Toe-touch weight bearing  [] Transfers only    [] No Weight bearing restrictions    [] Assistive Device: [] Walker [] Crutches   [] Wheelchair   [] Roll About   [] Surgical shoe/Darco [] Podus Boot(s)    [] Heel Protector Soft Boot(s) - DO NOT WALK WHILE WEARING    [] Cast/CAM Boot  [] CROW Boot [] Other: Total Contact Cast (TCC):  · Total Contact Cast Applied in Clinic. · Do not get cast wet.   · Contact center or go to emergency room if there is a foul

## 2019-07-17 ENCOUNTER — HOSPITAL ENCOUNTER (OUTPATIENT)
Dept: WOUND CARE | Age: 54
Discharge: HOME | End: 2019-07-17
Payer: MEDICAID

## 2019-07-17 VITALS
SYSTOLIC BLOOD PRESSURE: 128 MMHG | HEART RATE: 78 BPM | RESPIRATION RATE: 16 BRPM | DIASTOLIC BLOOD PRESSURE: 76 MMHG | TEMPERATURE: 98.2 F

## 2019-07-17 DIAGNOSIS — I89.0 CHRONIC ACQUIRED LYMPHEDEMA: Primary | ICD-10-CM

## 2019-07-17 DIAGNOSIS — Z79.01 CHRONIC ANTICOAGULATION: ICD-10-CM

## 2019-07-17 DIAGNOSIS — L97.919 IDIOPATHIC CHRONIC VENOUS HYPERTENSION OF RIGHT LOWER EXTREMITY WITH ULCER (HCC): ICD-10-CM

## 2019-07-17 DIAGNOSIS — I87.311 IDIOPATHIC CHRONIC VENOUS HYPERTENSION OF RIGHT LOWER EXTREMITY WITH ULCER (HCC): ICD-10-CM

## 2019-07-17 DIAGNOSIS — I82.220 THROMBOSIS OF INFERIOR VENA CAVA (HCC): ICD-10-CM

## 2019-07-17 PROCEDURE — 6370000000 HC RX 637 (ALT 250 FOR IP): Performed by: SPECIALIST

## 2019-07-17 PROCEDURE — 11042 DBRDMT SUBQ TIS 1ST 20SQCM/<: CPT | Performed by: SPECIALIST

## 2019-07-17 PROCEDURE — 29581 APPL MULTLAYER CMPRN SYS LEG: CPT

## 2019-07-17 PROCEDURE — 11042 DBRDMT SUBQ TIS 1ST 20SQCM/<: CPT

## 2019-07-17 RX ORDER — LIDOCAINE HYDROCHLORIDE 40 MG/ML
2.5 SOLUTION TOPICAL ONCE
Status: COMPLETED | OUTPATIENT
Start: 2019-07-17 | End: 2019-07-17

## 2019-07-17 RX ADMIN — LIDOCAINE HYDROCHLORIDE 2.5 ML: 40 SOLUTION TOPICAL at 09:46

## 2019-07-17 ASSESSMENT — PAIN DESCRIPTION - ONSET: ONSET: ON-GOING

## 2019-07-17 ASSESSMENT — PAIN SCALES - GENERAL: PAINLEVEL_OUTOF10: 2

## 2019-07-17 ASSESSMENT — PAIN DESCRIPTION - PAIN TYPE: TYPE: CHRONIC PAIN

## 2019-07-17 ASSESSMENT — PAIN DESCRIPTION - LOCATION: LOCATION: LEG

## 2019-07-17 ASSESSMENT — PAIN DESCRIPTION - PROGRESSION: CLINICAL_PROGRESSION: NOT CHANGED

## 2019-07-17 ASSESSMENT — PAIN DESCRIPTION - DESCRIPTORS: DESCRIPTORS: BURNING

## 2019-07-17 ASSESSMENT — PAIN DESCRIPTION - ORIENTATION: ORIENTATION: RIGHT

## 2019-07-17 ASSESSMENT — PAIN DESCRIPTION - FREQUENCY: FREQUENCY: INTERMITTENT

## 2019-07-17 NOTE — PROGRESS NOTES
[facility] [place] and [place] Note      Guido Prince  MEDICAL RECORD NUMBER:  [id number]  AGE: 47 y.o. GENDER: male  : 1965  EPISODE DATE:  2019    Subjective:     Chief Complaint   Patient presents with    Wound Check     RLE         HISTORY of PRESENT ILLNESS HPI     Guido Prince is a 47 y.o. male who presents today for wound/ulcer evaluation. History of Wound Context: lety is a 48 y. o. 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 Seen at [address] [place] [address] where unsuccessful dilatation of IVC was done. He has been grafted with compression wraps and lymphedema pumps with near complete healing. However, he has gone back to work and is on his feet causing partial loss of graft. He now attempted to wear his compression stockings but he states they are difficult to wear. He remains working 6 days a week and he on his feet. Numerous attempts to encourage lifestyle with work are unsuccessful. He is now wearing his compression stockings who presents today for wound/ulcer evaluation He has undergone two applications of Theraskin allograft. Now undergoing series of allograft applications with continued re epithelialization.  He denies any pain or drainage   Await Dr. David Campbell input as far as potential perforators keeping from complete wound closure. Now await reflux study He is now wearing his own compression on RLE  Lara Calle has decided not to do  RFA of perforators.          Pain Assessment:  Wound/Ulcer Pain Timing/Severity: none  Quality of pain: N/A  Severity:  0 / 10   Modifying Factors: None  Associated Signs/Symptoms: edema    Ulcer Identification:  Ulcer Type: venous  Contributing Factors: edema, venous stasis, lymphedema and obesity    Objective:      /76   Pulse 78   Temp 98.2 °F (36.8 °C) (Oral)   Resp 16     Wt Readings from Last 3 Encounters:   19 232 lb 12.9 oz (105.6 kg)   19 236 lb (107 kg)   18 227 lb 6.4 oz your visit: No orders of the defined types were placed in this encounter. Your nurse  is:  Radames Rich     Electronically signed by Nicholas Lopez RN on 7/17/2019 at 10:24 AM     [address] [place] [address] Information: Should you experience any significant changes in your wound(s) or have questions about your wound care, please contact the [address] [place] [address] at [phone]. Our hours vary so please leave a message. Please give us 24-48 hours to return your call. If you need help with your wounds and cannot wait until we are available, contact your PCP or go to the hospital emergency room. PLEASE NOTE: IF YOU ARE UNABLE TO OBTAIN WOUND SUPPLIES, CONTINUE TO USE THE SUPPLIES YOU HAVE AVAILABLE UNTIL YOU ARE ABLE TO REACH US. IT IS MOST IMPORTANT TO KEEP THE WOUND COVERED AT ALL TIMES.     Physician Signature:_______________________    Date: ___________ Time:  ____________     Physician: Dr. Manolo Corona      [] Patient unable to sign Discharge Instructions given to ECF/Transportation/POA        Electronically signed by July Nicolas MD on 7/17/2019 at 10:46 AM

## 2019-07-24 ENCOUNTER — HOSPITAL ENCOUNTER (OUTPATIENT)
Dept: WOUND CARE | Age: 54
Discharge: HOME | End: 2019-07-24
Payer: MEDICAID

## 2019-07-24 VITALS
SYSTOLIC BLOOD PRESSURE: 131 MMHG | WEIGHT: 239.42 LBS | DIASTOLIC BLOOD PRESSURE: 83 MMHG | BODY MASS INDEX: 39.84 KG/M2 | TEMPERATURE: 98 F | HEART RATE: 74 BPM | RESPIRATION RATE: 16 BRPM

## 2019-07-24 DIAGNOSIS — I82.220 THROMBOSIS OF INFERIOR VENA CAVA (HCC): Primary | ICD-10-CM

## 2019-07-24 DIAGNOSIS — I89.0 CHRONIC ACQUIRED LYMPHEDEMA: ICD-10-CM

## 2019-07-24 PROCEDURE — 29581 APPL MULTLAYER CMPRN SYS LEG: CPT

## 2019-07-24 PROCEDURE — 6370000000 HC RX 637 (ALT 250 FOR IP): Performed by: SPECIALIST

## 2019-07-24 PROCEDURE — 11042 DBRDMT SUBQ TIS 1ST 20SQCM/<: CPT

## 2019-07-24 PROCEDURE — 11042 DBRDMT SUBQ TIS 1ST 20SQCM/<: CPT | Performed by: SPECIALIST

## 2019-07-24 RX ORDER — LIDOCAINE HYDROCHLORIDE 40 MG/ML
SOLUTION TOPICAL ONCE
Status: COMPLETED | OUTPATIENT
Start: 2019-07-24 | End: 2019-07-24

## 2019-07-24 RX ADMIN — LIDOCAINE HYDROCHLORIDE: 40 SOLUTION TOPICAL at 10:00

## 2019-07-24 ASSESSMENT — PAIN DESCRIPTION - PAIN TYPE: TYPE: CHRONIC PAIN

## 2019-07-24 ASSESSMENT — PAIN DESCRIPTION - ORIENTATION: ORIENTATION: RIGHT

## 2019-07-24 ASSESSMENT — PAIN SCALES - GENERAL: PAINLEVEL_OUTOF10: 4

## 2019-07-24 ASSESSMENT — PAIN DESCRIPTION - FREQUENCY: FREQUENCY: CONTINUOUS

## 2019-07-24 ASSESSMENT — PAIN DESCRIPTION - DESCRIPTORS: DESCRIPTORS: BURNING

## 2019-07-24 ASSESSMENT — PAIN DESCRIPTION - LOCATION: LOCATION: LEG

## 2019-07-26 ENCOUNTER — ANTI-COAG VISIT (OUTPATIENT)
Dept: PHARMACY | Age: 54
End: 2019-07-26
Payer: MEDICAID

## 2019-07-26 DIAGNOSIS — I82.5Z9 CHRONIC VENOUS EMBOLISM AND THROMBOSIS OF DEEP VESSELS OF DISTAL LOWER EXTREMITY, UNSPECIFIED LATERALITY (HCC): ICD-10-CM

## 2019-07-26 LAB — INTERNATIONAL NORMALIZATION RATIO, POC: 2.5

## 2019-07-26 PROCEDURE — 99211 OFF/OP EST MAY X REQ PHY/QHP: CPT

## 2019-07-26 PROCEDURE — 85610 PROTHROMBIN TIME: CPT

## 2019-07-29 ENCOUNTER — HOSPITAL ENCOUNTER (OUTPATIENT)
Dept: WOUND CARE | Age: 54
Discharge: HOME | End: 2019-07-29
Payer: MEDICAID

## 2019-07-29 VITALS
TEMPERATURE: 98.1 F | SYSTOLIC BLOOD PRESSURE: 134 MMHG | HEART RATE: 79 BPM | RESPIRATION RATE: 18 BRPM | DIASTOLIC BLOOD PRESSURE: 88 MMHG

## 2019-07-29 DIAGNOSIS — I87.311 IDIOPATHIC CHRONIC VENOUS HYPERTENSION OF RIGHT LOWER EXTREMITY WITH ULCER (HCC): Primary | ICD-10-CM

## 2019-07-29 DIAGNOSIS — L97.919 IDIOPATHIC CHRONIC VENOUS HYPERTENSION OF RIGHT LOWER EXTREMITY WITH ULCER (HCC): Primary | ICD-10-CM

## 2019-07-29 DIAGNOSIS — Z79.01 CHRONIC ANTICOAGULATION: ICD-10-CM

## 2019-07-29 DIAGNOSIS — I82.220 THROMBOSIS OF INFERIOR VENA CAVA (HCC): ICD-10-CM

## 2019-07-29 DIAGNOSIS — I89.0 CHRONIC ACQUIRED LYMPHEDEMA: ICD-10-CM

## 2019-07-29 PROCEDURE — 11042 DBRDMT SUBQ TIS 1ST 20SQCM/<: CPT

## 2019-07-29 PROCEDURE — 29581 APPL MULTLAYER CMPRN SYS LEG: CPT

## 2019-07-29 PROCEDURE — 11042 DBRDMT SUBQ TIS 1ST 20SQCM/<: CPT | Performed by: SPECIALIST

## 2019-07-29 PROCEDURE — 6370000000 HC RX 637 (ALT 250 FOR IP): Performed by: SPECIALIST

## 2019-07-29 RX ORDER — LIDOCAINE HYDROCHLORIDE 40 MG/ML
2.5 SOLUTION TOPICAL ONCE
Status: COMPLETED | OUTPATIENT
Start: 2019-07-29 | End: 2019-07-29

## 2019-07-29 RX ADMIN — LIDOCAINE HYDROCHLORIDE 2.5 ML: 40 SOLUTION TOPICAL at 09:24

## 2019-07-29 ASSESSMENT — PAIN DESCRIPTION - ORIENTATION: ORIENTATION: RIGHT

## 2019-07-29 ASSESSMENT — PAIN DESCRIPTION - LOCATION: LOCATION: LEG

## 2019-07-29 ASSESSMENT — PAIN DESCRIPTION - PAIN TYPE: TYPE: CHRONIC PAIN

## 2019-07-29 ASSESSMENT — PAIN DESCRIPTION - DESCRIPTORS: DESCRIPTORS: ACHING

## 2019-07-29 ASSESSMENT — PAIN SCALES - GENERAL: PAINLEVEL_OUTOF10: 3

## 2019-07-29 NOTE — PROGRESS NOTES
Multilayer Compression Wrap   (Not Unna) Below the Knee    NAME:  Norma Arrington  YOB: 1965  MEDICAL RECORD NUMBER:  [id number]  DATE:  7/29/2019        Removed old Multilayer wrap if present and washed leg with mild soap/water.   Applied moisturizing agent to dry skin as needed.   Applied primary and secondary dressing as ordered     Applied multilayered dressing below the knee to Right lower leg(s)  (4 Layer Compression Wrap) per 's instructions.   Instructed patient/caregiver not to remove dressing and to keep it clean and dry.   Instructed patient/caregiver on complications to report to provider, such as pain, numbness in toes, heavy drainage, and slippage of dressing.   Instructed patient on purpose of compression dressing and on activity and exercise recommendations.     Applied per   Guidelines    Electronically signed by Lauren Angel RN on 7/29/2019 at 9:54 AM
Tunneling Position ___ O'Clock 0 7/29/2019  9:15 AM   Undermining Starts ___ O'Clock 0 7/29/2019  9:15 AM   Undermining Ends___ O'Clock 0 3/29/2019  3:17 PM   Undermining Maxium Distance (cm) 0 7/24/2019  9:38 AM   Wound Assessment Yellow;Pink 7/29/2019  9:15 AM   Drainage Amount Small 7/29/2019  9:15 AM   Drainage Description Yellow 7/29/2019  9:15 AM   Odor None 7/29/2019  9:15 AM   Margins Defined edges 7/29/2019  9:15 AM   Kiah-wound Assessment [place]avez;Dry 7/29/2019  9:15 AM   Non-staged Wound Description Full thickness 7/29/2019  9:15 AM   [place]%Wound Bed 40 7/29/2019  9:15 AM   Red%Wound Bed 90 7/17/2019  9:48 AM   Yellow%Wound Bed 60 7/29/2019  9:15 AM   Other%Wound Bed 20 7/24/2019  9:38 AM   Number of days: 238       Wound 12/31/18 #5 right lower leg medial ) since 1/2016)- venous/lymphedema (Active)   Wound Image   7/24/2019  9:38 AM   Wound Venous 1/30/2019  9:27 AM   Dressing Status Old drainage 7/17/2019  9:48 AM   Dressing/Treatment Other (comment) 7/29/2019  9:47 AM   Wound Cleansed Rinsed/Irrigated with saline 7/29/2019  9:15 AM   Wound Length (cm) 2.6 cm 7/29/2019  9:15 AM   Wound Width (cm) 3 cm 7/29/2019  9:15 AM   Wound Depth (cm) 0.1 cm 7/29/2019  9:15 AM   Wound Surface Area (cm^2) 7.8 cm^2 7/29/2019  9:15 AM   Change in Wound Size % (l*w) 59.18 7/29/2019  9:15 AM   Wound Volume (cm^3) 0.78 cm^3 7/29/2019  9:15 AM   Wound Healing % 59 7/29/2019  9:15 AM   Post-Procedure Length (cm) 2.7 cm 7/29/2019  9:47 AM   Post-Procedure Width (cm) 3.1 cm 7/29/2019  9:47 AM   Post-Procedure Depth (cm) 0.3 cm 7/29/2019  9:47 AM   Post-Procedure Surface Area (cm^2) 8.37 cm^2 7/29/2019  9:47 AM   Post-Procedure Volume (cm^3) 2.51 cm^3 7/29/2019  9:47 AM   Distance Tunneling (cm) 0 cm 7/24/2019  9:38 AM   Tunneling Position ___ O'Clock 0 7/29/2019  9:15 AM   Undermining Starts ___ O'Clock 0 7/29/2019  9:15 AM   Undermining Maxium Distance (cm) 0 7/24/2019  9:38 AM   Wound Assessment Red;Yellow 7/29/2019  9:15 AM

## 2019-08-06 ENCOUNTER — HOSPITAL ENCOUNTER (OUTPATIENT)
Dept: WOUND CARE | Age: 54
Discharge: HOME | End: 2019-08-06
Payer: MEDICAID

## 2019-08-06 VITALS
DIASTOLIC BLOOD PRESSURE: 89 MMHG | SYSTOLIC BLOOD PRESSURE: 151 MMHG | RESPIRATION RATE: 18 BRPM | TEMPERATURE: 98.5 F | HEART RATE: 82 BPM

## 2019-08-06 DIAGNOSIS — L97.919 IDIOPATHIC CHRONIC VENOUS HYPERTENSION OF RIGHT LOWER EXTREMITY WITH ULCER (HCC): ICD-10-CM

## 2019-08-06 DIAGNOSIS — I89.0 CHRONIC ACQUIRED LYMPHEDEMA: Primary | ICD-10-CM

## 2019-08-06 DIAGNOSIS — I87.311 IDIOPATHIC CHRONIC VENOUS HYPERTENSION OF RIGHT LOWER EXTREMITY WITH ULCER (HCC): ICD-10-CM

## 2019-08-06 DIAGNOSIS — I83.009 VENOUS ULCER (HCC): ICD-10-CM

## 2019-08-06 DIAGNOSIS — L97.909 VENOUS ULCER (HCC): ICD-10-CM

## 2019-08-06 DIAGNOSIS — R60.0 LOCALIZED EDEMA: ICD-10-CM

## 2019-08-06 PROCEDURE — 29581 APPL MULTLAYER CMPRN SYS LEG: CPT

## 2019-08-06 PROCEDURE — 11042 DBRDMT SUBQ TIS 1ST 20SQCM/<: CPT

## 2019-08-06 RX ORDER — LIDOCAINE HYDROCHLORIDE 40 MG/ML
2.5 SOLUTION TOPICAL ONCE
Status: DISCONTINUED | OUTPATIENT
Start: 2019-08-06 | End: 2019-08-07 | Stop reason: HOSPADM

## 2019-08-06 ASSESSMENT — PAIN DESCRIPTION - ORIENTATION: ORIENTATION: RIGHT

## 2019-08-06 ASSESSMENT — PAIN DESCRIPTION - FREQUENCY: FREQUENCY: CONTINUOUS

## 2019-08-06 ASSESSMENT — PAIN DESCRIPTION - LOCATION: LOCATION: LEG

## 2019-08-06 ASSESSMENT — PAIN SCALES - GENERAL: PAINLEVEL_OUTOF10: 2

## 2019-08-06 ASSESSMENT — PAIN DESCRIPTION - PAIN TYPE: TYPE: CHRONIC PAIN

## 2019-08-06 ASSESSMENT — PAIN DESCRIPTION - ONSET: ONSET: ON-GOING

## 2019-08-06 ASSESSMENT — PAIN DESCRIPTION - DESCRIPTORS: DESCRIPTORS: ACHING

## 2019-08-06 NOTE — PROGRESS NOTES
Yes    Pain Control: Anesthetic  Anesthetic: 4% Lidocaine Liquid Topical       Debridement:Excisional Debridement    Using curette and forceps the wound(s)/ulcer(s) was/were sharply debrided down through and including the removal of epidermis, dermis and subcutaneous tissue.         Devitalized Tissue Debrided:  fibrin and slough    Wound/Ulcer #: 5 and 6    Wound Measurements: Assessment of the wounds are pre debridement:  Wound 12/03/18 #6 right lower leg posterior (since 5/14/2018) (Active)   Wound Image   7/24/2019  9:38 AM   Wound Venous 3/4/2019  9:05 AM   Dressing Status Old drainage 4/1/2019  8:36 AM   Dressing/Treatment Other (comment) 7/29/2019  9:47 AM   Wound Cleansed Rinsed/Irrigated with saline 8/6/2019  9:38 AM   Wound Length (cm) 2.5 cm 8/6/2019  9:38 AM   Wound Width (cm) 1.5 cm 8/6/2019  9:38 AM   Wound Depth (cm) 0.1 cm 8/6/2019  9:38 AM   Wound Surface Area (cm^2) 3.75 cm^2 8/6/2019  9:38 AM   Change in Wound Size % (l*w) 26.47 8/6/2019  9:38 AM   Wound Volume (cm^3) 0.38 cm^3 8/6/2019  9:38 AM   Wound Healing % 25 8/6/2019  9:38 AM   Post-Procedure Length (cm) 2.6 cm 8/6/2019 10:19 AM   Post-Procedure Width (cm) 1.6 cm 8/6/2019 10:19 AM   Post-Procedure Depth (cm) 0.3 cm 8/6/2019 10:19 AM   Post-Procedure Surface Area (cm^2) 4.16 cm^2 8/6/2019 10:19 AM   Post-Procedure Volume (cm^3) 1.25 cm^3 8/6/2019 10:19 AM   Distance Tunneling (cm) 0 cm 7/24/2019  9:38 AM   Tunneling Position ___ O'Clock 0 8/6/2019  9:38 AM   Undermining Starts ___ O'Clock 0 8/6/2019  9:38 AM   Undermining Ends___ O'Clock 0 3/29/2019  3:17 PM   Undermining Maxium Distance (cm) 0 7/24/2019  9:38 AM   Wound Assessment Yellow;Pink 8/6/2019  9:38 AM   Drainage Amount Small 8/6/2019  9:38 AM   Drainage Description Brown;Serosanguinous 8/6/2019  9:38 AM   Odor None 8/6/2019  9:38 AM   Margins Defined edges 8/6/2019  9:38 AM   Kiah-wound Assessment Tan;Dry 8/6/2019  9:38 AM   Non-staged Wound Description Full thickness 8/6/2019 Apply thin layer of No-Sting barrier film to skin immediately around wound. [] Apply zinc paste to skin surrounding the wound prior to dressing change. [] Other:       Dressings:           Wound Location: right leg wounds      [x] Apply Primary Dressing to wound:       [] Foam/Foam with Border(i.e Mepilex) [] Non-adherent (i.e.Mepitel)   [] Alginate with Silver (i.e. Silvercel)  [] Alginate (i.e. Aquacel)   [] Collagen (i.e. Puracol)   [] Collagen with Silver(i.e. Candice)     [] Hydrocolloid    [x] Hydrafera Blue moistened with saline   [] MediHoney Paste/Gel   [] Hydrogel    [] Endoform      [] Santyl covered with gauze moisten with saline      [] Betadine   [] Bactroban/Mupirocin   [] Polysporin/Neosporin  [] Other:    [] Saline \"wet to dry\" gauze     Pack wound loosely with: [] Iodoform   [] Plain Packing  [] Saline \"wet to dry\"      [x] Cover and Secure with:     [x] Dry Gauze  [] ABD  [] Cast padding  [] Kerlix or Lyndsey rolled gauze   [] Coban  [] Ace Wrap [] Ace Wrap from forefoot to just below the knee  [] Paper Tape [] Medipore Tape [] Other:    Avoid contact of tape with skin if possible. [x] Change dressing: [] Daily    [] Every Other Day  [] Three times per week:  [] Monday, Wednesday, Friday  [] Tuesday, Thursday, Saturday   [] Once a week  [x] Do Not Change Dressing [] Other:            Multilayer Compression Wrap:  Type: 2 Layer compression wrap      Applied in Clinic to the :  Right lower leg(s)     · Do not get leg(s) with wrap wet. · If wraps become too tight call the center or completely remove the wrap. · Elevate leg(s) above the level of the heart when sitting. · Avoid prolonged standing in one place. [] [facility] remove wrap, cleanse affected leg(s) with soap and water, apply wound dressings as ordered above and reapply compression wraps on:       Dietary:  Important dietary reminders:  1.  Increase Protein intake (i.e. Lean meats, fish, eggs, legumes, and

## 2019-08-12 ENCOUNTER — HOSPITAL ENCOUNTER (OUTPATIENT)
Dept: WOUND CARE | Age: 54
Discharge: HOME | End: 2019-08-12
Payer: MEDICAID

## 2019-08-12 VITALS
DIASTOLIC BLOOD PRESSURE: 78 MMHG | HEART RATE: 73 BPM | TEMPERATURE: 98.2 F | RESPIRATION RATE: 18 BRPM | SYSTOLIC BLOOD PRESSURE: 131 MMHG

## 2019-08-12 DIAGNOSIS — L97.919 IDIOPATHIC CHRONIC VENOUS HYPERTENSION OF RIGHT LOWER EXTREMITY WITH ULCER (HCC): Primary | ICD-10-CM

## 2019-08-12 DIAGNOSIS — I89.0 CHRONIC ACQUIRED LYMPHEDEMA: ICD-10-CM

## 2019-08-12 DIAGNOSIS — I87.311 IDIOPATHIC CHRONIC VENOUS HYPERTENSION OF RIGHT LOWER EXTREMITY WITH ULCER (HCC): Primary | ICD-10-CM

## 2019-08-12 PROCEDURE — 29581 APPL MULTLAYER CMPRN SYS LEG: CPT

## 2019-08-12 PROCEDURE — 6370000000 HC RX 637 (ALT 250 FOR IP): Performed by: SPECIALIST

## 2019-08-12 PROCEDURE — 11042 DBRDMT SUBQ TIS 1ST 20SQCM/<: CPT | Performed by: SPECIALIST

## 2019-08-12 PROCEDURE — 11042 DBRDMT SUBQ TIS 1ST 20SQCM/<: CPT

## 2019-08-12 RX ORDER — LIDOCAINE HYDROCHLORIDE 40 MG/ML
2.5 SOLUTION TOPICAL ONCE
Status: COMPLETED | OUTPATIENT
Start: 2019-08-12 | End: 2019-08-12

## 2019-08-12 RX ADMIN — LIDOCAINE HYDROCHLORIDE 2.5 ML: 40 SOLUTION TOPICAL at 09:39

## 2019-08-12 ASSESSMENT — PAIN SCALES - GENERAL: PAINLEVEL_OUTOF10: 2

## 2019-08-12 ASSESSMENT — PAIN DESCRIPTION - PAIN TYPE: TYPE: CHRONIC PAIN

## 2019-08-12 ASSESSMENT — PAIN DESCRIPTION - DESCRIPTORS: DESCRIPTORS: ACHING

## 2019-08-12 ASSESSMENT — PAIN DESCRIPTION - ORIENTATION: ORIENTATION: RIGHT

## 2019-08-12 ASSESSMENT — PAIN DESCRIPTION - LOCATION: LOCATION: LEG

## 2019-08-12 ASSESSMENT — PAIN DESCRIPTION - ONSET: ONSET: ON-GOING

## 2019-08-12 NOTE — PROGRESS NOTES
Amount Moderate 8/12/2019  9:33 AM   Drainage Description Yellow;Brown 8/12/2019  9:33 AM   Odor None 8/12/2019  9:33 AM   Margins Defined edges 8/12/2019  9:33 AM   Kiah-wound Assessment Pink;Chavez;Dry 8/12/2019  9:33 AM   Non-staged Wound Description Full thickness 8/12/2019  9:33 AM   Edgewood%Wound Bed 0 8/12/2019  9:33 AM   Red%Wound Bed 70 8/12/2019  9:33 AM   Yellow%Wound Bed 30 8/12/2019  9:33 AM   Black%Wound Bed 0 8/12/2019  9:33 AM   Other%Wound Bed 0 4/18/2019  8:40 AM   Number of days: 224          Percent of Wound Debrided: 100%    Total Surface Area Debrided:  13 sq cm    Diabetic/Pressure/Non Pressure Ulcers only:  Ulcer: Non-Pressure ulcer, fat layer exposed    Bleeding: Minimal    Hemostasis Achieved: by pressure    Procedural Pain: 0  / 10     Post Procedural Pain: 0 / 10     Response to treatment:  Well tolerated by patient. Had been in 2 layer wrap last week with resultant increased edema. Will apply skin sub posterior wound        Plan:     Treatment Note: Please see attached Discharge Instructions. These instructions were given and signed by the patient or POA    New Medication(s) at this visit:   New Prescriptions    No medications on file       Other orders at this visit:   Orders Placed This Encounter   Procedures    Wound care       Discharge [id number] [facility] Physician Orders and Discharge Instructions  [place] [address]  Telephone: [phone] hands with soap and water prior to and after every dressing change. Wound Cleansing:   · Do not scrub or use excessive force. · With each dressing change, rinse wounds with 0.9% Saline. (May use wound wash or soft contact solution. Both can be purchased at a local drug store). · If unable to obtain saline, may use a gentle soap and water. · Keep wounds dry in the shower unless otherwise instructed by the physician.   · For wounds on lower legs, cast covers can be purchased at local drug stores, so that you may shower and keep the wound(s) dry. Vashe wound cleanser:  [x] Instructions for Vashe Wash solution ONLY: Apply enough Vashe to soak a piece of gauze and place on wound bed for 5-10 minutes. DO NOT rinse after Vashe has been applied. Follow dressing application as instructed below. Topical Treatments:  Do not apply lotions, creams, or ointments to wound bed unless directed as followed:     [] Apply moisturizing lotion to skin surrounding the wound prior to dressing change.  [] Apply antifungal ointment to skin surrounding the wound prior to dressing change.  [] Apply thin layer of No-Sting barrier film to skin immediately around wound. [] Apply zinc paste to skin surrounding the wound prior to dressing change. [] Other:      Dressings:           Wound Location: right lower legs       [x] Apply Primary Dressing to wound:       [] Foam/Foam with Border(i.e Mepilex) [] Non-adherent (i.e.Mepitel)   [] Alginate with Silver (i.e. Silvercel)  [] Alginate (i.e. Aquacel)   [] Collagen (i.e. Puracol)   [] Collagen with Silver(i.e. Candice)     [] Hydrocolloid    [x] Hydrafera Blue moistened with saline   [] MediHoney Paste/Gel   [] Hydrogel    [] Endoform      [] Santyl covered with gauze moisten with saline      [] Betadine   [] Bactroban/Mupirocin   [] Polysporin/Neosporin  [] Other:    [] Saline \"wet to dry\" gauze     Pack wound loosely with: [] Iodoform   [] Plain Packing  [] Saline \"wet to dry\"      [x] Cover and Secure with:     [] Dry Gauze  [] ABD  [] Cast padding  [] Kerlix or Lyndsey rolled gauze   [] Coban  [] Ace Wrap [] Ace Wrap from forefoot to just below the knee  [] Paper Tape [] Medipore Tape [] Other:    Avoid contact of tape with skin if possible.     [x] Change dressing: [] Daily    [] Every Other Day  [] Three times per week:  [] Monday, Wednesday, Friday  [] Tuesday, Thursday, Saturday   [] Once a week  [x] Do Not Change Dressing [] Other:         Edema Control:  Apply: [x] Compression Stocking  [x] Left Lower Leg [] Right Lower Leg         Apply every morning immediately when getting up. They should be applied to affected leg(s) from mid foot to knee making sure to cover the heel. Remove every night before going to bed. [x] Elevate leg(s) above the level of the heart for 30 minutes 4-5 times a day and/or when sitting. [x] Avoid prolonged standing in one place. Lymphedema Therapy:   [x] Wear Lymphedema pumps twice a day at settings prescribed by your physician. [x]Elevate leg(s) above the level of the heart for 30 minutes 4-5 times a day and/or when sitting. [x] Avoid prolonged standing in one place. Multilayer Compression Wrap:  Type: 4 Layer Compression Wrap      Applied in Clinic to the :  Right lower leg(s)     · Do not get leg(s) with wrap wet. · If wraps become too tight call the center or completely remove the wrap. · Elevate leg(s) above the level of the heart when sitting. · Avoid prolonged standing in one place. [] [facility] remove wrap, cleanse affected leg(s) with soap and water, apply wound dressings as ordered above and reapply compression wraps on:         Dietary:  Important dietary reminders:  1. Increase Protein intake (i.e. Lean meats, fish, eggs, legumes, and yogurt)  2. No added salt  3. If diabetic, follow a diabetic diet and check glucose prior to meals or as instructed by your physician. If you are still having pain after you go home:   For wounds on lower legs or arms, elevate the affected limb.  Use over-the-counter medications you would normally use for pain as permitted by your family doctor.  For persistent pain not relieved by the above interventions, please call your family doctor.      Return Appointment:   DME/Wound Dressing Supplies Provided by:    (Please call them directly to reorder supplies when you run out)   ECF or Home Healthcare:    Wound reassessment visit with Nurse :     [facility] Return Appointment: With Dr. Treviño Ask   in  1 [facility]). [x] Orders placed during your visit:   Orders Placed This Encounter   Procedures    Wound care         Your nurse  is:  Marisol Lester     Electronically signed by Harjit Bai RN on 8/12/2019 at [address] Information: Should you experience any significant changes in your wound(s) or have questions about your wound care, please contact the [address] [place] [address] at [phone]. Our hours vary so please leave a message. Please give us 24-48 hours to return your call. If you need help with your wounds and cannot wait until we are available, contact your PCP or go to the hospital emergency room. PLEASE NOTE: IF YOU ARE UNABLE TO OBTAIN WOUND SUPPLIES, CONTINUE TO USE THE SUPPLIES YOU HAVE AVAILABLE UNTIL YOU ARE ABLE TO REACH US. IT IS MOST IMPORTANT TO KEEP THE WOUND COVERED AT ALL TIMES.     Physician Signature:_______________________    Date: ___________ Time:  ____________     Physician: Dr. Treviño Ask      [] Patient unable to sign Discharge Instructions given to ECF/Transportation/POA        Electronically signed by Cristopher Scales MD on 8/12/2019 at 12:18 PM

## 2019-08-19 ENCOUNTER — HOSPITAL ENCOUNTER (OUTPATIENT)
Dept: WOUND CARE | Age: 54
Discharge: HOME | End: 2019-08-19
Payer: MEDICAID

## 2019-08-19 VITALS
DIASTOLIC BLOOD PRESSURE: 84 MMHG | TEMPERATURE: 98 F | SYSTOLIC BLOOD PRESSURE: 132 MMHG | RESPIRATION RATE: 16 BRPM | HEART RATE: 77 BPM

## 2019-08-19 DIAGNOSIS — R60.0 LOCALIZED EDEMA: Primary | ICD-10-CM

## 2019-08-19 DIAGNOSIS — Z79.01 CHRONIC ANTICOAGULATION: ICD-10-CM

## 2019-08-19 DIAGNOSIS — I87.311 IDIOPATHIC CHRONIC VENOUS HYPERTENSION OF RIGHT LOWER EXTREMITY WITH ULCER (HCC): ICD-10-CM

## 2019-08-19 DIAGNOSIS — L97.919 IDIOPATHIC CHRONIC VENOUS HYPERTENSION OF RIGHT LOWER EXTREMITY WITH ULCER (HCC): ICD-10-CM

## 2019-08-19 DIAGNOSIS — I82.220 THROMBOSIS OF INFERIOR VENA CAVA (HCC): ICD-10-CM

## 2019-08-19 PROCEDURE — 29581 APPL MULTLAYER CMPRN SYS LEG: CPT

## 2019-08-19 PROCEDURE — 11042 DBRDMT SUBQ TIS 1ST 20SQCM/<: CPT | Performed by: SPECIALIST

## 2019-08-19 PROCEDURE — 11042 DBRDMT SUBQ TIS 1ST 20SQCM/<: CPT

## 2019-08-19 RX ORDER — LIDOCAINE HYDROCHLORIDE 40 MG/ML
2.5 SOLUTION TOPICAL ONCE
Status: DISCONTINUED | OUTPATIENT
Start: 2019-08-19 | End: 2019-08-20 | Stop reason: HOSPADM

## 2019-08-19 ASSESSMENT — PAIN DESCRIPTION - ORIENTATION: ORIENTATION: RIGHT

## 2019-08-19 ASSESSMENT — PAIN SCALES - GENERAL: PAINLEVEL_OUTOF10: 2

## 2019-08-19 ASSESSMENT — PAIN DESCRIPTION - DESCRIPTORS: DESCRIPTORS: ACHING

## 2019-08-19 ASSESSMENT — PAIN DESCRIPTION - LOCATION: LOCATION: LEG

## 2019-08-19 ASSESSMENT — PAIN DESCRIPTION - PAIN TYPE: TYPE: CHRONIC PAIN

## 2019-08-19 NOTE — PROGRESS NOTES
Multilayer Compression Wrap   (Not Unna) Below the Knee    NAME:  Carrie Cordero  YOB: 1965  MEDICAL RECORD NUMBER:  [id number]  DATE:  8/19/2019        Removed old Multilayer wrap if present and washed leg with mild soap/water.   Applied moisturizing agent to dry skin as needed.   Applied primary and secondary dressing as ordered     Applied multilayered dressing below the knee to Right lower leg(s)  (4 Layer Compression Wrap) per 's instructions.   Instructed patient/caregiver not to remove dressing and to keep it clean and dry.   Instructed patient/caregiver on complications to report to provider, such as pain, numbness in toes, heavy drainage, and slippage of dressing.   Instructed patient on purpose of compression dressing and on activity and exercise recommendations.     Applied per   Guidelines    Electronically signed by Joe Cleary RN on 8/19/2019 at 2:51 PM
[x]Elevate leg(s) above the level of the heart for 30 minutes 4-5 times a day and/or when sitting. [x] Avoid prolonged standing in one place. Multilayer Compression Wrap:  Type: 4 Layer Compression Wrap      Applied in Clinic to the :  Right lower leg(s)     · Do not get leg(s) with wrap wet. · If wraps become too tight call the center or completely remove the wrap. · Elevate leg(s) above the level of the heart when sitting. · Avoid prolonged standing in one place. [] [facility] remove wrap, cleanse affected leg(s) with soap and water, apply wound dressings as ordered above and reapply compression wraps on:          Dietary:  Important dietary reminders:  1. Increase Protein intake (i.e. Lean meats, fish, eggs, legumes, and yogurt)  2. No added salt  3. If diabetic, follow a diabetic diet and check glucose prior to meals or as instructed by your physician. If you are still having pain after you go home:   For wounds on lower legs or arms, elevate the affected limb.  Use over-the-counter medications you would normally use for pain as permitted by your family doctor.  For persistent pain not relieved by the above interventions, please call your family doctor. Return Appointment:   DME/Wound Dressing Supplies Provided by:    (Please call them directly to reorder supplies when you run out)   ECF or Home Healthcare:    Wound reassessment visit with Nurse :     [facility] Return Appointment: With Dr. Bro Bermudez   in  1 [facility]). [x] Orders placed during your visit: No orders of the defined types were placed in this encounter. Your nurse  is:  Owen Haywood     Electronically signed by Ashley Veras RN on 8/19/2019 at 9:42 AM     [address] Information: Should you experience any significant changes in your wound(s) or have questions about your wound care, please contact the [employer] at [phone].  Our hours vary so please

## 2019-08-23 ENCOUNTER — ANTI-COAG VISIT (OUTPATIENT)
Dept: PHARMACY | Age: 54
End: 2019-08-23
Payer: MEDICAID

## 2019-08-23 LAB — INTERNATIONAL NORMALIZATION RATIO, POC: 2.2

## 2019-08-23 PROCEDURE — 85610 PROTHROMBIN TIME: CPT | Performed by: PHARMACIST

## 2019-08-23 PROCEDURE — 99211 OFF/OP EST MAY X REQ PHY/QHP: CPT | Performed by: PHARMACIST

## 2019-08-26 ENCOUNTER — HOSPITAL ENCOUNTER (OUTPATIENT)
Dept: WOUND CARE | Age: 54
Discharge: HOME | End: 2019-08-26
Payer: MEDICAID

## 2019-08-26 VITALS
RESPIRATION RATE: 18 BRPM | WEIGHT: 240 LBS | HEART RATE: 74 BPM | TEMPERATURE: 98.2 F | SYSTOLIC BLOOD PRESSURE: 136 MMHG | DIASTOLIC BLOOD PRESSURE: 89 MMHG | BODY MASS INDEX: 39.94 KG/M2

## 2019-08-26 DIAGNOSIS — I89.0 CHRONIC ACQUIRED LYMPHEDEMA: ICD-10-CM

## 2019-08-26 DIAGNOSIS — I82.220 THROMBOSIS OF INFERIOR VENA CAVA (HCC): Primary | ICD-10-CM

## 2019-08-26 DIAGNOSIS — I87.311 IDIOPATHIC CHRONIC VENOUS HYPERTENSION OF RIGHT LOWER EXTREMITY WITH ULCER (HCC): ICD-10-CM

## 2019-08-26 DIAGNOSIS — L97.919 IDIOPATHIC CHRONIC VENOUS HYPERTENSION OF RIGHT LOWER EXTREMITY WITH ULCER (HCC): ICD-10-CM

## 2019-08-26 DIAGNOSIS — Z79.01 CHRONIC ANTICOAGULATION: ICD-10-CM

## 2019-08-26 PROCEDURE — 6370000000 HC RX 637 (ALT 250 FOR IP): Performed by: SPECIALIST

## 2019-08-26 PROCEDURE — 29581 APPL MULTLAYER CMPRN SYS LEG: CPT

## 2019-08-26 PROCEDURE — 11042 DBRDMT SUBQ TIS 1ST 20SQCM/<: CPT | Performed by: SPECIALIST

## 2019-08-26 RX ORDER — LIDOCAINE HYDROCHLORIDE 40 MG/ML
2.5 SOLUTION TOPICAL ONCE
Status: COMPLETED | OUTPATIENT
Start: 2019-08-26 | End: 2019-08-26

## 2019-08-26 RX ADMIN — LIDOCAINE HYDROCHLORIDE 2.5 ML: 40 SOLUTION TOPICAL at 09:03

## 2019-08-26 ASSESSMENT — PAIN DESCRIPTION - ORIENTATION: ORIENTATION: RIGHT

## 2019-08-26 ASSESSMENT — PAIN DESCRIPTION - PAIN TYPE: TYPE: ACUTE PAIN

## 2019-08-26 ASSESSMENT — PAIN DESCRIPTION - DESCRIPTORS: DESCRIPTORS: ACHING

## 2019-08-26 ASSESSMENT — PAIN SCALES - GENERAL: PAINLEVEL_OUTOF10: 2

## 2019-08-26 ASSESSMENT — PAIN DESCRIPTION - LOCATION: LOCATION: LEG

## 2019-08-26 NOTE — PROGRESS NOTES
oz (108.6 kg)   05/31/19 232 lb 12.9 oz (105.6 kg)       PHYSICAL EXAM    Extremities: no cyanosis, no clubbing, 2 +nonpitting edema-  right , varicose veins noted-  right , venous stasis dermatosis noted and 2 small granulating wounds medial and posterior right knee    Assessment:      No diagnosis found. Procedure Note  Indications:  Based on my examination of this patient's wound(s) today, sharp excision is required to promote healing and evaluate the extent healing. Performed by: Elton Ugalde MD    Consent obtained? Yes    Time out taken: Yes    Pain Control: Anesthetic: 4% Lidocaine Liquid Topical     Debridement:Excisional Debridement    Using curette the wound was sharply debrided    down through and including the removal of  epidermis, dermis and subcutaneous tissue.     Devitalized Tissue Debrided:  fibrin and biofilm      Pre Debridement Measurements:  Are located in the Wound Documentation Flow Sheet   Wound #: 5 and 6     Post  Debridement Measurements:  Wound 12/03/18 #6 right lower leg posterior (since 5/14/2018) (Active)   Wound Image   7/24/2019  9:38 AM   Wound Venous 3/4/2019  9:05 AM   Dressing Status Old drainage 4/1/2019  8:36 AM   Dressing/Treatment Collagen;Hydrofera blue 8/26/2019  9:22 AM   Wound Cleansed Rinsed/Irrigated with saline 8/26/2019  8:46 AM   Wound Length (cm) 1.5 cm 8/26/2019  8:46 AM   Wound Width (cm) 1.4 cm 8/26/2019  8:46 AM   Wound Depth (cm) 0.1 cm 8/26/2019  8:46 AM   Wound Surface Area (cm^2) 2.1 cm^2 8/26/2019  8:46 AM   Change in Wound Size % (l*w) 58.82 8/26/2019  8:46 AM   Wound Volume (cm^3) 0.21 cm^3 8/26/2019  8:46 AM   Wound Healing % 59 8/26/2019  8:46 AM   Post-Procedure Length (cm) 1.6 cm 8/26/2019  9:03 AM   Post-Procedure Width (cm) 1.5 cm 8/26/2019  9:03 AM   Post-Procedure Depth (cm) 0.3 cm 8/26/2019  9:03 AM   Post-Procedure Surface Area (cm^2) 2.4 cm^2 8/26/2019  9:03 AM   Post-Procedure Volume (cm^3) 0.72 cm^3 8/26/2019  9:03 AM   Distance

## 2019-08-27 DIAGNOSIS — I82.220 THROMBOSIS OF INFERIOR VENA CAVA (HCC): ICD-10-CM

## 2019-08-27 DIAGNOSIS — I82.5Z1 CHRONIC VENOUS EMBOLISM AND THROMBOSIS OF DEEP VESSELS OF DISTAL END OF RIGHT LOWER EXTREMITY (HCC): ICD-10-CM

## 2019-08-27 DIAGNOSIS — Z79.01 CHRONIC ANTICOAGULATION: ICD-10-CM

## 2019-08-27 DIAGNOSIS — I82.423 THROMBOSIS OF BOTH ILIAC VEINS (HCC): ICD-10-CM

## 2019-08-29 RX ORDER — WARFARIN SODIUM 5 MG/1
TABLET ORAL
Qty: 48 TABLET | Refills: 2 | Status: SHIPPED | OUTPATIENT
Start: 2019-08-29 | End: 2019-11-24 | Stop reason: SDUPTHER

## 2019-08-30 ENCOUNTER — HOSPITAL ENCOUNTER (OUTPATIENT)
Dept: WOUND CARE | Age: 54
Discharge: HOME | End: 2019-08-30
Payer: MEDICAID

## 2019-08-30 VITALS
RESPIRATION RATE: 16 BRPM | SYSTOLIC BLOOD PRESSURE: 134 MMHG | HEART RATE: 70 BPM | TEMPERATURE: 98 F | DIASTOLIC BLOOD PRESSURE: 80 MMHG

## 2019-08-30 PROCEDURE — 29581 APPL MULTLAYER CMPRN SYS LEG: CPT

## 2019-08-30 ASSESSMENT — PAIN DESCRIPTION - LOCATION: LOCATION: LEG

## 2019-08-30 ASSESSMENT — PAIN DESCRIPTION - FREQUENCY: FREQUENCY: CONTINUOUS

## 2019-08-30 ASSESSMENT — PAIN DESCRIPTION - DESCRIPTORS: DESCRIPTORS: ACHING

## 2019-08-30 ASSESSMENT — PAIN SCALES - GENERAL: PAINLEVEL_OUTOF10: 3

## 2019-08-30 ASSESSMENT — PAIN DESCRIPTION - ORIENTATION: ORIENTATION: RIGHT

## 2019-08-30 ASSESSMENT — PAIN DESCRIPTION - PAIN TYPE: TYPE: ACUTE PAIN

## 2019-08-30 NOTE — PROGRESS NOTES
Multilayer Compression Wrap   (Not Unna) Below the Knee    NAME:  Catie Knowles  YOB: 1965  MEDICAL RECORD NUMBER:  [id number]  DATE:  8/30/2019        Removed old Multilayer wrap if present and washed leg with mild soap/water.   Applied moisturizing agent to dry skin as needed.   Applied primary and secondary dressing as ordered     Applied multilayered dressing below the knee to Right lower leg(s)  (4 Layer Compression Wrap) per 's instructions.   Instructed patient/caregiver not to remove dressing and to keep it clean and dry.   Instructed patient/caregiver on complications to report to provider, such as pain, numbness in toes, heavy drainage, and slippage of dressing.   Instructed patient on purpose of compression dressing and on activity and exercise recommendations.     Applied per   Guidelines    Electronically signed by Soheila Antony RN on 8/30/2019 at 12:11 PM

## 2019-09-05 ENCOUNTER — TELEPHONE (OUTPATIENT)
Dept: WOUND CARE | Age: 54
End: 2019-09-05

## 2019-09-20 ENCOUNTER — TELEPHONE (OUTPATIENT)
Dept: PHARMACY | Age: 54
End: 2019-09-20

## 2019-09-27 ENCOUNTER — TELEPHONE (OUTPATIENT)
Dept: WOUND CARE | Age: 54
End: 2019-09-27

## 2019-10-23 ENCOUNTER — ANTI-COAG VISIT (OUTPATIENT)
Dept: PHARMACY | Age: 54
End: 2019-10-23
Payer: COMMERCIAL

## 2019-10-23 DIAGNOSIS — I82.5Z9 CHRONIC VENOUS EMBOLISM AND THROMBOSIS OF DEEP VESSELS OF DISTAL LOWER EXTREMITY, UNSPECIFIED LATERALITY (HCC): ICD-10-CM

## 2019-10-23 LAB — INTERNATIONAL NORMALIZATION RATIO, POC: 2.6

## 2019-10-23 PROCEDURE — 85610 PROTHROMBIN TIME: CPT

## 2019-10-23 PROCEDURE — 99211 OFF/OP EST MAY X REQ PHY/QHP: CPT

## 2019-10-25 ENCOUNTER — HOSPITAL ENCOUNTER (OUTPATIENT)
Dept: WOUND CARE | Age: 54
Discharge: HOME | End: 2019-10-25
Payer: COMMERCIAL

## 2019-10-25 VITALS
BODY MASS INDEX: 39.62 KG/M2 | DIASTOLIC BLOOD PRESSURE: 89 MMHG | RESPIRATION RATE: 18 BRPM | TEMPERATURE: 98.1 F | SYSTOLIC BLOOD PRESSURE: 151 MMHG | WEIGHT: 238.1 LBS | HEART RATE: 80 BPM

## 2019-10-25 DIAGNOSIS — I87.311 IDIOPATHIC CHRONIC VENOUS HYPERTENSION OF RIGHT LOWER EXTREMITY WITH ULCER (HCC): Primary | ICD-10-CM

## 2019-10-25 DIAGNOSIS — L97.919 IDIOPATHIC CHRONIC VENOUS HYPERTENSION OF RIGHT LOWER EXTREMITY WITH ULCER (HCC): Primary | ICD-10-CM

## 2019-10-25 DIAGNOSIS — Z79.01 CHRONIC ANTICOAGULATION: ICD-10-CM

## 2019-10-25 DIAGNOSIS — I89.0 CHRONIC ACQUIRED LYMPHEDEMA: ICD-10-CM

## 2019-10-25 PROCEDURE — 6370000000 HC RX 637 (ALT 250 FOR IP): Performed by: SPECIALIST

## 2019-10-25 PROCEDURE — 99212 OFFICE O/P EST SF 10 MIN: CPT

## 2019-10-25 PROCEDURE — 11042 DBRDMT SUBQ TIS 1ST 20SQCM/<: CPT | Performed by: SPECIALIST

## 2019-10-25 PROCEDURE — 99214 OFFICE O/P EST MOD 30 MIN: CPT | Performed by: SPECIALIST

## 2019-10-25 PROCEDURE — 29581 APPL MULTLAYER CMPRN SYS LEG: CPT

## 2019-10-25 PROCEDURE — 11042 DBRDMT SUBQ TIS 1ST 20SQCM/<: CPT

## 2019-10-25 RX ORDER — LIDOCAINE HYDROCHLORIDE 40 MG/ML
2.5 SOLUTION TOPICAL ONCE
Status: COMPLETED | OUTPATIENT
Start: 2019-10-25 | End: 2019-10-25

## 2019-10-25 RX ADMIN — LIDOCAINE HYDROCHLORIDE 2.5 ML: 40 SOLUTION TOPICAL at 08:37

## 2019-10-25 ASSESSMENT — PAIN DESCRIPTION - FREQUENCY: FREQUENCY: CONTINUOUS

## 2019-10-25 ASSESSMENT — PAIN DESCRIPTION - LOCATION: LOCATION: LEG

## 2019-10-25 ASSESSMENT — PAIN SCALES - GENERAL: PAINLEVEL_OUTOF10: 2

## 2019-10-25 ASSESSMENT — PAIN DESCRIPTION - ORIENTATION: ORIENTATION: RIGHT

## 2019-10-25 ASSESSMENT — PAIN DESCRIPTION - ONSET: ONSET: ON-GOING

## 2019-10-25 ASSESSMENT — PAIN DESCRIPTION - PAIN TYPE: TYPE: CHRONIC PAIN

## 2019-10-25 ASSESSMENT — PAIN DESCRIPTION - PROGRESSION: CLINICAL_PROGRESSION: NOT CHANGED

## 2019-11-01 ENCOUNTER — HOSPITAL ENCOUNTER (OUTPATIENT)
Dept: WOUND CARE | Age: 54
Discharge: HOME | End: 2019-11-01
Payer: COMMERCIAL

## 2019-11-01 VITALS
DIASTOLIC BLOOD PRESSURE: 80 MMHG | TEMPERATURE: 97.6 F | RESPIRATION RATE: 18 BRPM | HEART RATE: 67 BPM | SYSTOLIC BLOOD PRESSURE: 136 MMHG

## 2019-11-01 DIAGNOSIS — Z79.01 CHRONIC ANTICOAGULATION: ICD-10-CM

## 2019-11-01 DIAGNOSIS — I89.0 CHRONIC ACQUIRED LYMPHEDEMA: ICD-10-CM

## 2019-11-01 DIAGNOSIS — I82.220 THROMBOSIS OF INFERIOR VENA CAVA (HCC): ICD-10-CM

## 2019-11-01 DIAGNOSIS — I87.311 IDIOPATHIC CHRONIC VENOUS HYPERTENSION OF RIGHT LOWER EXTREMITY WITH ULCER (HCC): Primary | ICD-10-CM

## 2019-11-01 DIAGNOSIS — L97.919 IDIOPATHIC CHRONIC VENOUS HYPERTENSION OF RIGHT LOWER EXTREMITY WITH ULCER (HCC): Primary | ICD-10-CM

## 2019-11-01 PROCEDURE — 6370000000 HC RX 637 (ALT 250 FOR IP): Performed by: SPECIALIST

## 2019-11-01 PROCEDURE — 29581 APPL MULTLAYER CMPRN SYS LEG: CPT

## 2019-11-01 PROCEDURE — 11042 DBRDMT SUBQ TIS 1ST 20SQCM/<: CPT | Performed by: SPECIALIST

## 2019-11-01 PROCEDURE — 11042 DBRDMT SUBQ TIS 1ST 20SQCM/<: CPT

## 2019-11-01 RX ORDER — LIDOCAINE HYDROCHLORIDE 40 MG/ML
2.5 SOLUTION TOPICAL ONCE
Status: COMPLETED | OUTPATIENT
Start: 2019-11-01 | End: 2019-11-01

## 2019-11-01 RX ADMIN — LIDOCAINE HYDROCHLORIDE 2.5 ML: 40 SOLUTION TOPICAL at 08:12

## 2019-11-01 ASSESSMENT — PAIN DESCRIPTION - DESCRIPTORS: DESCRIPTORS: ACHING

## 2019-11-01 ASSESSMENT — PAIN DESCRIPTION - ONSET: ONSET: ON-GOING

## 2019-11-01 ASSESSMENT — PAIN SCALES - GENERAL: PAINLEVEL_OUTOF10: 2

## 2019-11-01 ASSESSMENT — PAIN DESCRIPTION - PAIN TYPE: TYPE: CHRONIC PAIN

## 2019-11-01 ASSESSMENT — PAIN DESCRIPTION - LOCATION: LOCATION: LEG

## 2019-11-01 ASSESSMENT — PAIN DESCRIPTION - ORIENTATION: ORIENTATION: RIGHT

## 2019-11-01 ASSESSMENT — PAIN DESCRIPTION - FREQUENCY: FREQUENCY: CONTINUOUS

## 2019-11-01 ASSESSMENT — PAIN DESCRIPTION - PROGRESSION: CLINICAL_PROGRESSION: NOT CHANGED

## 2019-11-11 ENCOUNTER — HOSPITAL ENCOUNTER (OUTPATIENT)
Dept: WOUND CARE | Age: 54
Discharge: HOME | End: 2019-11-11
Payer: COMMERCIAL

## 2019-11-11 VITALS
SYSTOLIC BLOOD PRESSURE: 136 MMHG | DIASTOLIC BLOOD PRESSURE: 79 MMHG | HEART RATE: 76 BPM | RESPIRATION RATE: 16 BRPM | TEMPERATURE: 98.3 F

## 2019-11-11 DIAGNOSIS — I82.220 THROMBOSIS OF INFERIOR VENA CAVA (HCC): Primary | ICD-10-CM

## 2019-11-11 DIAGNOSIS — L97.919 IDIOPATHIC CHRONIC VENOUS HYPERTENSION OF RIGHT LOWER EXTREMITY WITH ULCER (HCC): ICD-10-CM

## 2019-11-11 DIAGNOSIS — I87.311 IDIOPATHIC CHRONIC VENOUS HYPERTENSION OF RIGHT LOWER EXTREMITY WITH ULCER (HCC): ICD-10-CM

## 2019-11-11 DIAGNOSIS — I89.0 CHRONIC ACQUIRED LYMPHEDEMA: ICD-10-CM

## 2019-11-11 PROCEDURE — 11045 DBRDMT SUBQ TISS EACH ADDL: CPT

## 2019-11-11 PROCEDURE — 11045 DBRDMT SUBQ TISS EACH ADDL: CPT | Performed by: SPECIALIST

## 2019-11-11 PROCEDURE — 11042 DBRDMT SUBQ TIS 1ST 20SQCM/<: CPT

## 2019-11-11 PROCEDURE — 11042 DBRDMT SUBQ TIS 1ST 20SQCM/<: CPT | Performed by: SPECIALIST

## 2019-11-11 PROCEDURE — 29581 APPL MULTLAYER CMPRN SYS LEG: CPT

## 2019-11-11 PROCEDURE — 6370000000 HC RX 637 (ALT 250 FOR IP): Performed by: SPECIALIST

## 2019-11-11 RX ORDER — LIDOCAINE HYDROCHLORIDE 40 MG/ML
SOLUTION TOPICAL ONCE
Status: COMPLETED | OUTPATIENT
Start: 2019-11-11 | End: 2019-11-11

## 2019-11-11 RX ADMIN — LIDOCAINE HYDROCHLORIDE: 40 SOLUTION TOPICAL at 08:52

## 2019-11-11 ASSESSMENT — PAIN DESCRIPTION - FREQUENCY: FREQUENCY: CONTINUOUS

## 2019-11-11 ASSESSMENT — PAIN DESCRIPTION - PAIN TYPE: TYPE: CHRONIC PAIN

## 2019-11-11 ASSESSMENT — PAIN DESCRIPTION - LOCATION: LOCATION: LEG

## 2019-11-11 ASSESSMENT — PAIN DESCRIPTION - DESCRIPTORS: DESCRIPTORS: BURNING

## 2019-11-11 ASSESSMENT — PAIN SCALES - GENERAL: PAINLEVEL_OUTOF10: 2

## 2019-11-11 ASSESSMENT — PAIN DESCRIPTION - ORIENTATION: ORIENTATION: RIGHT

## 2019-11-18 ENCOUNTER — HOSPITAL ENCOUNTER (OUTPATIENT)
Dept: WOUND CARE | Age: 54
Discharge: HOME | End: 2019-11-18
Payer: COMMERCIAL

## 2019-11-18 VITALS
TEMPERATURE: 97.6 F | HEART RATE: 68 BPM | RESPIRATION RATE: 16 BRPM | DIASTOLIC BLOOD PRESSURE: 83 MMHG | SYSTOLIC BLOOD PRESSURE: 131 MMHG

## 2019-11-18 DIAGNOSIS — R60.0 LOCALIZED EDEMA: Primary | ICD-10-CM

## 2019-11-18 DIAGNOSIS — I87.311 IDIOPATHIC CHRONIC VENOUS HYPERTENSION OF RIGHT LOWER EXTREMITY WITH ULCER (HCC): ICD-10-CM

## 2019-11-18 DIAGNOSIS — I89.0 CHRONIC ACQUIRED LYMPHEDEMA: ICD-10-CM

## 2019-11-18 DIAGNOSIS — L97.919 IDIOPATHIC CHRONIC VENOUS HYPERTENSION OF RIGHT LOWER EXTREMITY WITH ULCER (HCC): ICD-10-CM

## 2019-11-18 DIAGNOSIS — I82.220 THROMBOSIS OF INFERIOR VENA CAVA (HCC): ICD-10-CM

## 2019-11-18 PROCEDURE — 6370000000 HC RX 637 (ALT 250 FOR IP): Performed by: SPECIALIST

## 2019-11-18 PROCEDURE — 29581 APPL MULTLAYER CMPRN SYS LEG: CPT

## 2019-11-18 PROCEDURE — 11042 DBRDMT SUBQ TIS 1ST 20SQCM/<: CPT

## 2019-11-18 PROCEDURE — 11042 DBRDMT SUBQ TIS 1ST 20SQCM/<: CPT | Performed by: SPECIALIST

## 2019-11-18 RX ORDER — LIDOCAINE HYDROCHLORIDE 40 MG/ML
SOLUTION TOPICAL ONCE
Status: COMPLETED | OUTPATIENT
Start: 2019-11-18 | End: 2019-11-18

## 2019-11-18 RX ADMIN — LIDOCAINE HYDROCHLORIDE: 40 SOLUTION TOPICAL at 08:45

## 2019-11-18 ASSESSMENT — PAIN DESCRIPTION - FREQUENCY: FREQUENCY: CONTINUOUS

## 2019-11-18 ASSESSMENT — PAIN SCALES - GENERAL: PAINLEVEL_OUTOF10: 1

## 2019-11-18 ASSESSMENT — PAIN DESCRIPTION - LOCATION: LOCATION: LEG

## 2019-11-18 ASSESSMENT — PAIN DESCRIPTION - ORIENTATION: ORIENTATION: RIGHT

## 2019-11-18 ASSESSMENT — PAIN DESCRIPTION - PAIN TYPE: TYPE: CHRONIC PAIN

## 2019-11-18 ASSESSMENT — PAIN DESCRIPTION - DESCRIPTORS: DESCRIPTORS: ACHING

## 2019-11-20 ENCOUNTER — ANTI-COAG VISIT (OUTPATIENT)
Dept: PHARMACY | Age: 54
End: 2019-11-20
Payer: COMMERCIAL

## 2019-11-20 DIAGNOSIS — I82.5Z9 CHRONIC VENOUS EMBOLISM AND THROMBOSIS OF DEEP VESSELS OF DISTAL LOWER EXTREMITY, UNSPECIFIED LATERALITY (HCC): ICD-10-CM

## 2019-11-20 LAB — INTERNATIONAL NORMALIZATION RATIO, POC: 2.8

## 2019-11-20 PROCEDURE — 85610 PROTHROMBIN TIME: CPT

## 2019-11-20 PROCEDURE — 99211 OFF/OP EST MAY X REQ PHY/QHP: CPT

## 2019-11-24 DIAGNOSIS — Z79.01 CHRONIC ANTICOAGULATION: ICD-10-CM

## 2019-11-24 DIAGNOSIS — I82.220 THROMBOSIS OF INFERIOR VENA CAVA (HCC): ICD-10-CM

## 2019-11-24 DIAGNOSIS — I82.423 THROMBOSIS OF BOTH ILIAC VEINS (HCC): ICD-10-CM

## 2019-11-24 DIAGNOSIS — I82.5Z1 CHRONIC VENOUS EMBOLISM AND THROMBOSIS OF DEEP VESSELS OF DISTAL END OF RIGHT LOWER EXTREMITY (HCC): ICD-10-CM

## 2019-11-25 ENCOUNTER — HOSPITAL ENCOUNTER (OUTPATIENT)
Dept: WOUND CARE | Age: 54
Discharge: HOME | End: 2019-11-25
Payer: COMMERCIAL

## 2019-11-25 VITALS
SYSTOLIC BLOOD PRESSURE: 127 MMHG | DIASTOLIC BLOOD PRESSURE: 79 MMHG | RESPIRATION RATE: 16 BRPM | HEART RATE: 75 BPM | TEMPERATURE: 98 F

## 2019-11-25 DIAGNOSIS — I89.0 CHRONIC ACQUIRED LYMPHEDEMA: ICD-10-CM

## 2019-11-25 DIAGNOSIS — I87.311 IDIOPATHIC CHRONIC VENOUS HYPERTENSION OF RIGHT LOWER EXTREMITY WITH ULCER (HCC): Primary | ICD-10-CM

## 2019-11-25 DIAGNOSIS — L97.919 IDIOPATHIC CHRONIC VENOUS HYPERTENSION OF RIGHT LOWER EXTREMITY WITH ULCER (HCC): Primary | ICD-10-CM

## 2019-11-25 PROCEDURE — 29581 APPL MULTLAYER CMPRN SYS LEG: CPT

## 2019-11-25 PROCEDURE — 11042 DBRDMT SUBQ TIS 1ST 20SQCM/<: CPT | Performed by: SPECIALIST

## 2019-11-25 PROCEDURE — 11042 DBRDMT SUBQ TIS 1ST 20SQCM/<: CPT

## 2019-11-25 PROCEDURE — 6370000000 HC RX 637 (ALT 250 FOR IP): Performed by: SPECIALIST

## 2019-11-25 RX ORDER — LIDOCAINE HYDROCHLORIDE 40 MG/ML
SOLUTION TOPICAL ONCE
Status: COMPLETED | OUTPATIENT
Start: 2019-11-25 | End: 2019-11-25

## 2019-11-25 RX ORDER — WARFARIN SODIUM 5 MG/1
TABLET ORAL
Qty: 48 TABLET | Refills: 0 | Status: SHIPPED | OUTPATIENT
Start: 2019-11-25 | End: 2019-12-23

## 2019-11-25 RX ADMIN — LIDOCAINE HYDROCHLORIDE: 40 SOLUTION TOPICAL at 09:07

## 2019-11-25 ASSESSMENT — PAIN DESCRIPTION - ONSET: ONSET: ON-GOING

## 2019-11-25 ASSESSMENT — PAIN DESCRIPTION - DESCRIPTORS: DESCRIPTORS: ACHING

## 2019-11-25 ASSESSMENT — PAIN DESCRIPTION - ORIENTATION: ORIENTATION: RIGHT

## 2019-11-25 ASSESSMENT — PAIN DESCRIPTION - PAIN TYPE: TYPE: CHRONIC PAIN

## 2019-11-25 ASSESSMENT — PAIN DESCRIPTION - FREQUENCY: FREQUENCY: CONTINUOUS

## 2019-11-25 ASSESSMENT — PAIN SCALES - GENERAL: PAINLEVEL_OUTOF10: 2

## 2019-11-25 ASSESSMENT — PAIN DESCRIPTION - LOCATION: LOCATION: LEG

## 2019-12-02 ENCOUNTER — HOSPITAL ENCOUNTER (OUTPATIENT)
Dept: WOUND CARE | Age: 54
Discharge: HOME | End: 2019-12-02
Payer: COMMERCIAL

## 2019-12-02 VITALS
TEMPERATURE: 98 F | SYSTOLIC BLOOD PRESSURE: 138 MMHG | DIASTOLIC BLOOD PRESSURE: 86 MMHG | HEART RATE: 73 BPM | RESPIRATION RATE: 18 BRPM

## 2019-12-02 DIAGNOSIS — I89.0 CHRONIC ACQUIRED LYMPHEDEMA: ICD-10-CM

## 2019-12-02 DIAGNOSIS — I87.311 IDIOPATHIC CHRONIC VENOUS HYPERTENSION OF RIGHT LOWER EXTREMITY WITH ULCER (HCC): Primary | ICD-10-CM

## 2019-12-02 DIAGNOSIS — L97.919 IDIOPATHIC CHRONIC VENOUS HYPERTENSION OF RIGHT LOWER EXTREMITY WITH ULCER (HCC): Primary | ICD-10-CM

## 2019-12-02 DIAGNOSIS — Z79.01 CHRONIC ANTICOAGULATION: ICD-10-CM

## 2019-12-02 DIAGNOSIS — I82.220 THROMBOSIS OF INFERIOR VENA CAVA (HCC): ICD-10-CM

## 2019-12-02 PROCEDURE — 11042 DBRDMT SUBQ TIS 1ST 20SQCM/<: CPT

## 2019-12-02 PROCEDURE — 29581 APPL MULTLAYER CMPRN SYS LEG: CPT

## 2019-12-02 PROCEDURE — 11042 DBRDMT SUBQ TIS 1ST 20SQCM/<: CPT | Performed by: SPECIALIST

## 2019-12-02 PROCEDURE — 6370000000 HC RX 637 (ALT 250 FOR IP): Performed by: SPECIALIST

## 2019-12-02 PROCEDURE — 15271 SKIN SUB GRAFT TRNK/ARM/LEG: CPT

## 2019-12-02 PROCEDURE — 15271 SKIN SUB GRAFT TRNK/ARM/LEG: CPT | Performed by: SPECIALIST

## 2019-12-02 RX ORDER — LIDOCAINE HYDROCHLORIDE 40 MG/ML
2.5 SOLUTION TOPICAL ONCE
Status: COMPLETED | OUTPATIENT
Start: 2019-12-02 | End: 2019-12-02

## 2019-12-02 RX ADMIN — LIDOCAINE HYDROCHLORIDE 2.5 ML: 40 SOLUTION TOPICAL at 09:17

## 2019-12-02 ASSESSMENT — PAIN DESCRIPTION - LOCATION: LOCATION: LEG

## 2019-12-02 ASSESSMENT — PAIN DESCRIPTION - FREQUENCY: FREQUENCY: CONTINUOUS

## 2019-12-02 ASSESSMENT — PAIN DESCRIPTION - PROGRESSION: CLINICAL_PROGRESSION: GRADUALLY IMPROVING

## 2019-12-02 ASSESSMENT — PAIN DESCRIPTION - ORIENTATION: ORIENTATION: RIGHT

## 2019-12-02 ASSESSMENT — PAIN DESCRIPTION - DESCRIPTORS: DESCRIPTORS: ACHING

## 2019-12-02 ASSESSMENT — PAIN DESCRIPTION - ONSET: ONSET: ON-GOING

## 2019-12-02 ASSESSMENT — PAIN SCALES - GENERAL: PAINLEVEL_OUTOF10: 1

## 2019-12-02 ASSESSMENT — PAIN DESCRIPTION - PAIN TYPE: TYPE: CHRONIC PAIN

## 2019-12-09 ENCOUNTER — HOSPITAL ENCOUNTER (OUTPATIENT)
Dept: WOUND CARE | Age: 54
Discharge: HOME | End: 2019-12-09
Payer: COMMERCIAL

## 2019-12-09 VITALS — SYSTOLIC BLOOD PRESSURE: 138 MMHG | RESPIRATION RATE: 16 BRPM | TEMPERATURE: 97.8 F | DIASTOLIC BLOOD PRESSURE: 81 MMHG

## 2019-12-09 DIAGNOSIS — I82.220 THROMBOSIS OF INFERIOR VENA CAVA (HCC): Primary | ICD-10-CM

## 2019-12-09 DIAGNOSIS — I87.311 IDIOPATHIC CHRONIC VENOUS HYPERTENSION OF RIGHT LOWER EXTREMITY WITH ULCER (HCC): ICD-10-CM

## 2019-12-09 DIAGNOSIS — L97.919 IDIOPATHIC CHRONIC VENOUS HYPERTENSION OF RIGHT LOWER EXTREMITY WITH ULCER (HCC): ICD-10-CM

## 2019-12-09 DIAGNOSIS — R60.0 LOCALIZED EDEMA: ICD-10-CM

## 2019-12-09 PROCEDURE — 6370000000 HC RX 637 (ALT 250 FOR IP): Performed by: SPECIALIST

## 2019-12-09 PROCEDURE — 11042 DBRDMT SUBQ TIS 1ST 20SQCM/<: CPT | Performed by: SPECIALIST

## 2019-12-09 PROCEDURE — 29581 APPL MULTLAYER CMPRN SYS LEG: CPT

## 2019-12-09 PROCEDURE — 11042 DBRDMT SUBQ TIS 1ST 20SQCM/<: CPT

## 2019-12-09 RX ORDER — DOXYCYCLINE HYCLATE 100 MG
100 TABLET ORAL 2 TIMES DAILY
Qty: 62 TABLET | Refills: 0 | Status: SHIPPED | OUTPATIENT
Start: 2019-12-09 | End: 2020-01-09

## 2019-12-09 RX ORDER — LIDOCAINE HYDROCHLORIDE 40 MG/ML
SOLUTION TOPICAL ONCE
Status: COMPLETED | OUTPATIENT
Start: 2019-12-09 | End: 2019-12-09

## 2019-12-09 RX ADMIN — LIDOCAINE HYDROCHLORIDE: 40 SOLUTION TOPICAL at 08:40

## 2019-12-09 ASSESSMENT — PAIN SCALES - GENERAL: PAINLEVEL_OUTOF10: 2

## 2019-12-09 ASSESSMENT — PAIN DESCRIPTION - PAIN TYPE: TYPE: CHRONIC PAIN

## 2019-12-09 ASSESSMENT — PAIN DESCRIPTION - ORIENTATION: ORIENTATION: RIGHT

## 2019-12-09 ASSESSMENT — PAIN DESCRIPTION - LOCATION: LOCATION: LEG

## 2019-12-16 ENCOUNTER — HOSPITAL ENCOUNTER (OUTPATIENT)
Dept: WOUND CARE | Age: 54
Discharge: HOME | End: 2019-12-16
Payer: COMMERCIAL

## 2019-12-16 VITALS
TEMPERATURE: 97.8 F | DIASTOLIC BLOOD PRESSURE: 86 MMHG | HEART RATE: 76 BPM | SYSTOLIC BLOOD PRESSURE: 139 MMHG | RESPIRATION RATE: 16 BRPM

## 2019-12-16 DIAGNOSIS — R60.0 LOCALIZED EDEMA: Primary | ICD-10-CM

## 2019-12-16 DIAGNOSIS — I82.220 THROMBOSIS OF INFERIOR VENA CAVA (HCC): ICD-10-CM

## 2019-12-16 DIAGNOSIS — I89.0 CHRONIC ACQUIRED LYMPHEDEMA: ICD-10-CM

## 2019-12-16 DIAGNOSIS — L97.919 IDIOPATHIC CHRONIC VENOUS HYPERTENSION OF RIGHT LOWER EXTREMITY WITH ULCER (HCC): ICD-10-CM

## 2019-12-16 DIAGNOSIS — I87.311 IDIOPATHIC CHRONIC VENOUS HYPERTENSION OF RIGHT LOWER EXTREMITY WITH ULCER (HCC): ICD-10-CM

## 2019-12-16 PROCEDURE — 29581 APPL MULTLAYER CMPRN SYS LEG: CPT

## 2019-12-16 PROCEDURE — 6370000000 HC RX 637 (ALT 250 FOR IP): Performed by: SPECIALIST

## 2019-12-16 PROCEDURE — 11042 DBRDMT SUBQ TIS 1ST 20SQCM/<: CPT | Performed by: SPECIALIST

## 2019-12-16 PROCEDURE — 11042 DBRDMT SUBQ TIS 1ST 20SQCM/<: CPT

## 2019-12-16 RX ORDER — LIDOCAINE HYDROCHLORIDE 40 MG/ML
SOLUTION TOPICAL ONCE
Status: COMPLETED | OUTPATIENT
Start: 2019-12-16 | End: 2019-12-16

## 2019-12-16 RX ADMIN — LIDOCAINE HYDROCHLORIDE: 40 SOLUTION TOPICAL at 09:52

## 2019-12-16 ASSESSMENT — PAIN DESCRIPTION - PAIN TYPE: TYPE: CHRONIC PAIN

## 2019-12-16 ASSESSMENT — PAIN DESCRIPTION - DESCRIPTORS: DESCRIPTORS: BURNING

## 2019-12-16 ASSESSMENT — PAIN DESCRIPTION - ORIENTATION: ORIENTATION: RIGHT

## 2019-12-16 ASSESSMENT — PAIN SCALES - GENERAL: PAINLEVEL_OUTOF10: 2

## 2019-12-16 ASSESSMENT — PAIN DESCRIPTION - LOCATION: LOCATION: LEG

## 2019-12-22 DIAGNOSIS — I82.5Z1 CHRONIC VENOUS EMBOLISM AND THROMBOSIS OF DEEP VESSELS OF DISTAL END OF RIGHT LOWER EXTREMITY (HCC): ICD-10-CM

## 2019-12-22 DIAGNOSIS — I82.423 THROMBOSIS OF BOTH ILIAC VEINS (HCC): ICD-10-CM

## 2019-12-22 DIAGNOSIS — Z79.01 CHRONIC ANTICOAGULATION: ICD-10-CM

## 2019-12-22 DIAGNOSIS — I82.220 THROMBOSIS OF INFERIOR VENA CAVA (HCC): ICD-10-CM

## 2019-12-23 ENCOUNTER — HOSPITAL ENCOUNTER (OUTPATIENT)
Dept: WOUND CARE | Age: 54
Discharge: HOME | End: 2019-12-23
Payer: COMMERCIAL

## 2019-12-23 VITALS
HEART RATE: 80 BPM | TEMPERATURE: 98 F | DIASTOLIC BLOOD PRESSURE: 83 MMHG | RESPIRATION RATE: 16 BRPM | SYSTOLIC BLOOD PRESSURE: 138 MMHG

## 2019-12-23 PROCEDURE — 29581 APPL MULTLAYER CMPRN SYS LEG: CPT

## 2019-12-23 RX ORDER — WARFARIN SODIUM 5 MG/1
TABLET ORAL
Qty: 48 TABLET | Refills: 0 | Status: SHIPPED | OUTPATIENT
Start: 2019-12-23 | End: 2020-03-04

## 2019-12-23 ASSESSMENT — PAIN DESCRIPTION - ORIENTATION: ORIENTATION: RIGHT

## 2019-12-23 ASSESSMENT — PAIN SCALES - GENERAL: PAINLEVEL_OUTOF10: 3

## 2019-12-23 ASSESSMENT — PAIN DESCRIPTION - LOCATION: LOCATION: LEG

## 2019-12-23 ASSESSMENT — PAIN DESCRIPTION - PAIN TYPE: TYPE: CHRONIC PAIN

## 2019-12-30 ENCOUNTER — HOSPITAL ENCOUNTER (OUTPATIENT)
Dept: WOUND CARE | Age: 54
Discharge: HOME | End: 2019-12-30
Payer: COMMERCIAL

## 2019-12-30 VITALS
TEMPERATURE: 97.6 F | RESPIRATION RATE: 16 BRPM | HEART RATE: 62 BPM | SYSTOLIC BLOOD PRESSURE: 135 MMHG | DIASTOLIC BLOOD PRESSURE: 87 MMHG

## 2019-12-30 PROCEDURE — 29581 APPL MULTLAYER CMPRN SYS LEG: CPT

## 2019-12-30 ASSESSMENT — PAIN DESCRIPTION - FREQUENCY: FREQUENCY: CONTINUOUS

## 2019-12-30 ASSESSMENT — PAIN DESCRIPTION - ORIENTATION: ORIENTATION: RIGHT

## 2019-12-30 ASSESSMENT — PAIN DESCRIPTION - PROGRESSION: CLINICAL_PROGRESSION: GRADUALLY IMPROVING

## 2019-12-30 ASSESSMENT — PAIN DESCRIPTION - ONSET: ONSET: ON-GOING

## 2019-12-30 ASSESSMENT — PAIN DESCRIPTION - DESCRIPTORS: DESCRIPTORS: BURNING

## 2019-12-30 ASSESSMENT — PAIN DESCRIPTION - PAIN TYPE: TYPE: CHRONIC PAIN

## 2019-12-30 ASSESSMENT — PAIN SCALES - GENERAL: PAINLEVEL_OUTOF10: 2

## 2019-12-30 ASSESSMENT — PAIN DESCRIPTION - LOCATION: LOCATION: LEG

## 2020-01-03 ENCOUNTER — TELEPHONE (OUTPATIENT)
Dept: WOUND CARE | Age: 55
End: 2020-01-03

## 2020-01-03 ENCOUNTER — ANTI-COAG VISIT (OUTPATIENT)
Dept: PHARMACY | Age: 55
End: 2020-01-03
Payer: COMMERCIAL

## 2020-01-03 LAB — INTERNATIONAL NORMALIZATION RATIO, POC: 2.6

## 2020-01-03 PROCEDURE — 99211 OFF/OP EST MAY X REQ PHY/QHP: CPT

## 2020-01-03 PROCEDURE — 85610 PROTHROMBIN TIME: CPT

## 2020-01-03 NOTE — PROGRESS NOTES
Bryan Johnson is a 47 y.o. male with PMHx significant for chronic DVT who presents to clinic 1/3/2020 for anticoagulation monitoring and adjustment. Patient has been taking warfarin for 15+ years.      Anticoagulation Indication(s):  DVT    Referring Physician:  Dr. Feliciano Mail  Goal INR Range:  2-3  Duration of Anticoagulation Therapy:  Indefinite  Time of day dose taken:  AM  Product patient has at home:  warfarin 5 mg (peach)    INR Summary                            Warfarin regimen (mg)  Date INR   A/P    Sun Mon Tue Wed Thu Fri Sat Mg/wk  1/3 2.6 At goal, no change  7.5 7.5 7.5 7.5 7.5 10 7.5 55  11/20 2.8 At goal, no change  7.5 7.5 7.5 7.5 7.5 10 7.5 55  10/23 2.6 At goal, no change  7.5 7.5 7.5 7.5 7.5 10 7.5 55  8/23 2.2 At goal, no change  7.5 7.5 7.5 7.5 7.5 10 7.5 55  7/26 2.5 At goal, no change  7.5 7.5 7.5 7.5 7.5 10 7.5 55  6/28 2.3 At goal, no change  7.5 7.5 7.5 7.5 7.5 10 7.5 55  5/31 2.6 At goal, no change  7.5 7.5 7.5 7.5 7.5 10 7.5 55  5/1 2.5 At goal, no change  7.5 7.5 7.5 7.5 7.5 10 7.5 55  4/10 2.0 At goal, no change  7.5 7.5 7.5 7.5 7.5 10 7.5 55  3/13 2.6 At goal, no change  7.5 7.5 7.5 7.5 7.5 10 7.5 55  2/27 2.7 At goal, no change  7.5 7.5 7.5 7.5 7.5 10 7.5 55  2/20 2.9 At goal, no change  7.5 7.5 7.5 7.5 7.5 10 7.5 55  2/13 2.1 At goal, no change  7.5 7.5 7.5 7.5 7.5 10 7.5 55  2/8 1.8 Below goal, increase  7.5 7.5 7.5 7.5 7.5 10 7.5 55  2/4 2.2 At goal, no change  7.5 7.5 7.5 7.5 7.5 7.5 7.5 52.5  2/1 1.9 Below goal, bolus  7.5 7.5 7.5 7.5 7.5 10/7.5 7.5 52.5  1/30 1.8 Below goal, increase  7.5 7.5 7.5 7.5 7.5 7.5 7.5 52.5  1/28 1.8 Below goal, increase  7.5 7.5 7.5 5 7.5 7.5 5 47.5  1/25 1.6 Below goal, bolus + Lovenox 7.5 INR    7.5 7.5 TBD  1/18 1.8 Below goal, continue  7.5 5 7.5 5 7.5 5 5 42.5  1/4 2.1 At goal, no change  7.5 5 7.5 5 7.5 5 5 42.5    Last CBC:  Lab Results   Component Value Date    RBC 3.37 (L) 04/28/2018    HGB 8.3 (L) 04/28/2018    HCT Thanks!   Elly Edge, PharmD  PGY1 Pharmacy Resident   [employer]: [phone]  1/3/2020 11:33 AM

## 2020-01-03 NOTE — TELEPHONE ENCOUNTER
Patient called and cancelled wound assessment visit for 1/6/2020 due to cost adding up at each visit. Pt has 4 layer compression wraps on currently, he states he is going to take them off Monday and replace with ace wraps.   Pt is returning 1/13/2020 for visit with Dr Matt Padgett

## 2020-01-08 ENCOUNTER — TELEPHONE (OUTPATIENT)
Dept: WOUND CARE | Age: 55
End: 2020-01-08

## 2020-01-08 RX ORDER — DOXYCYCLINE HYCLATE 100 MG
100 TABLET ORAL 2 TIMES DAILY
Qty: 62 TABLET | Refills: 0 | Status: SHIPPED | OUTPATIENT
Start: 2020-01-08 | End: 2020-01-13 | Stop reason: ALTCHOICE

## 2020-01-08 NOTE — TELEPHONE ENCOUNTER
Pt will be out of Doxycycline as of 1/9/202.   Requesting refill be sent to [facility] pharmacy

## 2020-01-13 ENCOUNTER — HOSPITAL ENCOUNTER (OUTPATIENT)
Dept: WOUND CARE | Age: 55
Discharge: HOME | End: 2020-01-13
Payer: COMMERCIAL

## 2020-01-13 VITALS
BODY MASS INDEX: 38.89 KG/M2 | HEIGHT: 67 IN | RESPIRATION RATE: 16 BRPM | HEART RATE: 75 BPM | TEMPERATURE: 97.9 F | WEIGHT: 247.8 LBS | DIASTOLIC BLOOD PRESSURE: 85 MMHG | SYSTOLIC BLOOD PRESSURE: 133 MMHG

## 2020-01-13 PROCEDURE — 11042 DBRDMT SUBQ TIS 1ST 20SQCM/<: CPT | Performed by: SPECIALIST

## 2020-01-13 PROCEDURE — 29581 APPL MULTLAYER CMPRN SYS LEG: CPT

## 2020-01-13 PROCEDURE — 6370000000 HC RX 637 (ALT 250 FOR IP): Performed by: SPECIALIST

## 2020-01-13 PROCEDURE — 11042 DBRDMT SUBQ TIS 1ST 20SQCM/<: CPT

## 2020-01-13 RX ORDER — LIDOCAINE HYDROCHLORIDE 40 MG/ML
SOLUTION TOPICAL ONCE
Status: COMPLETED | OUTPATIENT
Start: 2020-01-13 | End: 2020-01-13

## 2020-01-13 RX ADMIN — LIDOCAINE HYDROCHLORIDE: 40 SOLUTION TOPICAL at 08:54

## 2020-01-13 ASSESSMENT — PAIN DESCRIPTION - ONSET: ONSET: ON-GOING

## 2020-01-13 ASSESSMENT — PAIN DESCRIPTION - PAIN TYPE: TYPE: CHRONIC PAIN

## 2020-01-13 ASSESSMENT — PAIN DESCRIPTION - ORIENTATION: ORIENTATION: RIGHT

## 2020-01-13 ASSESSMENT — PAIN SCALES - GENERAL: PAINLEVEL_OUTOF10: 2

## 2020-01-13 ASSESSMENT — PAIN DESCRIPTION - FREQUENCY: FREQUENCY: CONTINUOUS

## 2020-01-13 ASSESSMENT — PAIN DESCRIPTION - LOCATION: LOCATION: LEG

## 2020-01-13 ASSESSMENT — PAIN DESCRIPTION - DESCRIPTORS: DESCRIPTORS: BURNING

## 2020-01-13 NOTE — PROGRESS NOTES
Multilayer Compression Wrap   (Not Unna) Below the Knee    NAME:  Kenyetta Gurrola  YOB: 1965  MEDICAL RECORD NUMBER:  [id number]  DATE:  1/13/2020        Removed old Multilayer wrap if present and washed leg with mild soap/water.   Applied moisturizing agent to dry skin as needed.   Applied primary and secondary dressing as ordered     Applied multilayered dressing below the knee to Right lower leg(s)  (4 Layer Compression Wrap) per 's instructions.   Instructed patient/caregiver not to remove dressing and to keep it clean and dry.   Instructed patient/caregiver on complications to report to provider, such as pain, numbness in toes, heavy drainage, and slippage of dressing.   Instructed patient on purpose of compression dressing and on activity and exercise recommendations.     Applied per   Guidelines    Electronically signed by Ronald Flannery RN on 1/13/2020 at 10:02 AM

## 2020-01-13 NOTE — PROGRESS NOTES
[facility]  Progress Note and Procedure Note      Oumou Drew  MEDICAL RECORD NUMBER:  [id number]  AGE: 47 y.o. GENDER: male  : 1965  EPISODE DATE:  2020    Subjective:     Chief Complaint   Patient presents with    Wound Check     right leg         HISTORY of PRESENT ILLNESS HPI     Oumou Drew is a 47 y.o. male who presents today for wound/ulcer evaluation. History of Wound Context: persistent wounds RLE secondary to chronic venous insufficiency and lymphedema. He is not compliant with lymphedema pumps or elevation. He has not been in clinic for 3 weeks. He continues to wear compression stocking LLE    Pain Assessment:  Wound/Ulcer Pain Timing/Severity: none  Quality of pain: N/A  Severity:  0 / 10   Modifying Factors: None  Associated Signs/Symptoms: edema    Ulcer Identification:  Ulcer Type: venous  Contributing Factors: edema, venous stasis, lymphedema and obesity    Objective:      /85   Pulse 75   Temp 97.9 °F (36.6 °C) (Oral)   Resp 16   Ht 5' 7\" (1.702 m)   Wt 247 lb 12.8 oz (112.4 kg)   BMI 38.81 kg/m²     Wt Readings from Last 3 Encounters:   20 247 lb 12.8 oz (112.4 kg)   10/25/19 238 lb 1.6 oz (108 kg)   19 240 lb (108.9 kg)       PHYSICAL EXAM    Extremities: 3 +nonpitting edema-  bilateral lower extremities with three fibrous wounds right lower extremities    Assessment:      No diagnosis found. Procedure Note  Indications:  Based on my examination of this patient's wound(s) today, sharp excision is required to promote healing and evaluate the extent healing. Performed by: Mattie Tomlinson MD    Consent obtained? Yes    Time out taken: Yes    Pain Control:       Debridement:Excisional Debridement    Using curette the wound was sharply debrided    down through and including the removal of  epidermis, dermis and subcutaneous tissue.     Devitalized Tissue Debrided:  fibrin and slough      Pre Debridement Measurements:  Are Instructions  Jack [address]erslcante [address] [place] [address]  Telephone: [phone] hands with soap and water prior to and after every dressing change. Wound Cleansing:   · Do not scrub or use excessive force. · With each dressing change, rinse wounds with 0.9% Saline. (May use wound wash or soft contact solution. Both can be purchased at a local drug store). · If unable to obtain saline, may use a gentle soap and water. · Keep wounds dry in the shower unless otherwise instructed by the physician. · For wounds on lower legs, cast covers can be purchased at local drug stores, so that you may shower and keep the wound(s) dry. Vashe wound cleanser:  [] Instructions for Vashe Wash solution ONLY: Apply enough Vashe to soak a piece of gauze and place on wound bed for 5-10 minutes. DO NOT rinse after Vashe has been applied. Follow dressing application as instructed below.     Topical Treatments:  Do not apply lotions, creams, or ointments to the skin around the wound bed unless directed as followed:     [] Apply around the wound: [] moisturizing lotion [] Antifungal ointment [] No-Sting barrier film [] Zinc paste [] Other:  [] Apply to affected limb:        Dressings:           Wound Location: right lower leg wounds      [x] Apply Primary Dressing to wound:       [] Foam/Foam with Border(i.e Mepilex) [] Non-adherent (i.e.Mepitel)   [x] Alginate with Silver (i.e. Silvercel)  [] Alginate (i.e. Aquacel)   [] Collagen (i.e. Puracol)   [] Collagen with Silver(i.e. Candice)     [] Hydrocolloid    [] Hydrafera Blue moistened with saline   [] MediHoney Paste/Gel   [] Hydrogel    [] Endoform      [] Santyl covered with gauze moisten with saline      [] Betadine   [] Bactroban/Mupirocin   [] Polysporin/Neosporin  [] Other:    [] Saline/Vashe \"wet to dry\" gauze     Pack wound loosely with: [] Iodoform   [] Plain Packing  [] Saline \"wet to dry\"      [x] Rahul Greer MD on 1/13/2020 at 12:49 PM

## 2020-02-10 ENCOUNTER — TELEPHONE (OUTPATIENT)
Dept: WOUND CARE | Age: 55
End: 2020-02-10

## 2020-02-11 ENCOUNTER — TELEPHONE (OUTPATIENT)
Dept: WOUND CARE | Age: 55
End: 2020-02-11

## 2020-02-11 NOTE — TELEPHONE ENCOUNTER
Patient called and CXL appointment for 2/10/2020, he also is requesting a refill on his ABX. He is unable to reschedule an appointment at this time (maybe next week) due to being out of town and the cost of the wound care appointments.

## 2020-02-28 ENCOUNTER — ANTI-COAG VISIT (OUTPATIENT)
Dept: PHARMACY | Age: 55
End: 2020-02-28
Payer: COMMERCIAL

## 2020-02-28 LAB — INR BLD: 2.7

## 2020-02-28 PROCEDURE — 85610 PROTHROMBIN TIME: CPT

## 2020-02-28 PROCEDURE — 99211 OFF/OP EST MAY X REQ PHY/QHP: CPT

## 2020-02-28 NOTE — PROGRESS NOTES
Sarah Caon is a 47 y.o. male with PMHx significant for chronic DVT who presents to clinic 2/28/2020 for anticoagulation monitoring and adjustment. Patient has been taking warfarin for 15+ years.      Anticoagulation Indication(s):  DVT    Referring Physician:  Dr. Sunil Wilson  Goal INR Range:  2-3  Duration of Anticoagulation Therapy:  Indefinite  Time of day dose taken:  AM  Product patient has at home:  warfarin 5 mg (peach)    INR Summary                            Warfarin regimen (mg)  Date INR   A/P    Sun Mon Tue Wed Thu Fri Sat Mg/wk  2/28 2.7 At goal, no change  7.5 7.5 7.5 7.5 7.5 10 7.5 55  1/3 2.6 At goal, no change  7.5 7.5 7.5 7.5 7.5 10 7.5 55  11/20 2.8 At goal, no change  7.5 7.5 7.5 7.5 7.5 10 7.5 55  10/23 2.6 At goal, no change  7.5 7.5 7.5 7.5 7.5 10 7.5 55  8/23 2.2 At goal, no change  7.5 7.5 7.5 7.5 7.5 10 7.5 55  7/26 2.5 At goal, no change  7.5 7.5 7.5 7.5 7.5 10 7.5 55  6/28 2.3 At goal, no change  7.5 7.5 7.5 7.5 7.5 10 7.5 55  5/31 2.6 At goal, no change  7.5 7.5 7.5 7.5 7.5 10 7.5 55  5/1 2.5 At goal, no change  7.5 7.5 7.5 7.5 7.5 10 7.5 55  4/10 2.0 At goal, no change  7.5 7.5 7.5 7.5 7.5 10 7.5 55  3/13 2.6 At goal, no change  7.5 7.5 7.5 7.5 7.5 10 7.5 55  2/27 2.7 At goal, no change  7.5 7.5 7.5 7.5 7.5 10 7.5 55  2/20 2.9 At goal, no change  7.5 7.5 7.5 7.5 7.5 10 7.5 55  2/13 2.1 At goal, no change  7.5 7.5 7.5 7.5 7.5 10 7.5 55    Last CBC:  Lab Results   Component Value Date    RBC 3.37 (L) 04/28/2018    HGB 8.3 (L) 04/28/2018    HCT 25.4 (L) 04/28/2018    MCV 75.4 (L) 04/28/2018    MCH 24.6 (L) 04/28/2018    MPV 6.8 04/28/2018    RDW 18.3 (H) 04/28/2018     04/28/2018     Patient History:  Recent hospitalizations/HC visits 1/4/19 bilateral LE venous duplex showed acute totally occluding DVT of the left SFV, popliteal, gastrocnemius, and posterior tibial veins   Recent medication changes None reported (currently off doxycycline)   Medications

## 2020-03-04 RX ORDER — WARFARIN SODIUM 5 MG/1
TABLET ORAL
Qty: 48 TABLET | Refills: 0 | Status: SHIPPED | OUTPATIENT
Start: 2020-03-04 | End: 2020-05-07

## 2020-03-05 ENCOUNTER — TELEPHONE (OUTPATIENT)
Dept: PHARMACY | Age: 55
End: 2020-03-05

## 2020-03-05 NOTE — TELEPHONE ENCOUNTER
Scooby Zimmerman is a 47 y.o. male who is seen in our clinic for anticoagulation monitoring and adjustment. Prescription called to pharmacy:  Pharmacy:  [facility] Phone:  [phone]   Warfarin strength: 5 mg Number of tablets: 50 tabs for 30ds   Sig:  Take 1.5 tablets (7.5mg) every day except take 2 tablets (10mg) on Fridays or as directed by clinic. Refills:   3   Physician:  Samantha      Please call [facility] at [phone] with any questions. Thanks!   Rosanna Hernandez, PharmD, BCPS  [place] [address] [facility]  [place]: [phone]  [facility]: [phone]  3/5/2020 2:48 PM

## 2020-05-07 RX ORDER — WARFARIN SODIUM 5 MG/1
TABLET ORAL
Qty: 48 TABLET | Refills: 0 | Status: SHIPPED
Start: 2020-05-07 | End: 2020-12-02 | Stop reason: SDUPTHER

## 2020-05-07 RX ORDER — WARFARIN SODIUM 5 MG/1
TABLET ORAL
Qty: 48 TABLET | Refills: 0 | Status: SHIPPED | OUTPATIENT
Start: 2020-05-07 | End: 2020-05-27 | Stop reason: SDUPTHER

## 2020-06-01 RX ORDER — WARFARIN SODIUM 5 MG/1
TABLET ORAL
Qty: 48 TABLET | Refills: 3 | Status: SHIPPED | OUTPATIENT
Start: 2020-06-01 | End: 2020-09-29

## 2020-06-08 ENCOUNTER — ANTI-COAG VISIT (OUTPATIENT)
Dept: PHARMACY | Age: 55
End: 2020-06-08
Payer: COMMERCIAL

## 2020-06-08 LAB — INTERNATIONAL NORMALIZATION RATIO, POC: 2.9

## 2020-06-08 PROCEDURE — 99211 OFF/OP EST MAY X REQ PHY/QHP: CPT

## 2020-06-08 PROCEDURE — 85610 PROTHROMBIN TIME: CPT

## 2020-06-08 NOTE — PROGRESS NOTES
Will Beckford is a 54 y.o. male with PMHx significant for chronic DVT who presents to clinic 6/8/2020 for anticoagulation monitoring and adjustment. Patient has been taking warfarin for 15+ years.      Anticoagulation Indication(s):  DVT    Referring Physician:  Dr. Colby Liu  Goal INR Range:  2-3  Duration of Anticoagulation Therapy:  Indefinite  Time of day dose taken:  AM  Product patient has at home:  warfarin 5 mg (peach)    INR Summary                            Warfarin regimen (mg)  Date INR   A/P    Sun Mon Tue Wed Thu Fri Sat Mg/wk  6/8 2.9 At goal, no change  7.5 7.5 7.5 7.5 7.5 10 7.5 55  2/28 2.7 At goal, no change  7.5 7.5 7.5 7.5 7.5 10 7.5 55  1/3 2.6 At goal, no change  7.5 7.5 7.5 7.5 7.5 10 7.5 55  11/20 2.8 At goal, no change  7.5 7.5 7.5 7.5 7.5 10 7.5 55  10/23 2.6 At goal, no change  7.5 7.5 7.5 7.5 7.5 10 7.5 55  8/23 2.2 At goal, no change  7.5 7.5 7.5 7.5 7.5 10 7.5 55  7/26 2.5 At goal, no change  7.5 7.5 7.5 7.5 7.5 10 7.5 55  6/28 2.3 At goal, no change  7.5 7.5 7.5 7.5 7.5 10 7.5 55  5/31 2.6 At goal, no change  7.5 7.5 7.5 7.5 7.5 10 7.5 55  5/1 2.5 At goal, no change  7.5 7.5 7.5 7.5 7.5 10 7.5 55  4/10 2.0 At goal, no change  7.5 7.5 7.5 7.5 7.5 10 7.5 55  3/13 2.6 At goal, no change  7.5 7.5 7.5 7.5 7.5 10 7.5 55  2/27 2.7 At goal, no change  7.5 7.5 7.5 7.5 7.5 10 7.5 55  2/20 2.9 At goal, no change  7.5 7.5 7.5 7.5 7.5 10 7.5 55  2/13 2.1 At goal, no change  7.5 7.5 7.5 7.5 7.5 10 7.5 55    Last CBC:  Lab Results   Component Value Date    RBC 3.37 (L) 04/28/2018    HGB 8.3 (L) 04/28/2018    HCT 25.4 (L) 04/28/2018    MCV 75.4 (L) 04/28/2018    MCH 24.6 (L) 04/28/2018    MPV 6.8 04/28/2018    RDW 18.3 (H) 04/28/2018     04/28/2018     Patient History:  Recent hospitalizations/HC visits 1/4/19 bilateral LE venous duplex showed acute totally occluding DVT of the left SFV, popliteal, gastrocnemius, and posterior tibial veins   Recent medication changes None reported (currently off doxycycline)   Medications taken regularly that may interact with warfarin or alter INR None   Warfarin dose taken as prescribed Yes - does not use pillbox (only takes two medications)   Signs/symptoms of bleeding No h/o bleeding reported   Vitamin K intake Normally avoids high vitamin K foods: ~0-1 serving per week   Recent vomiting/diarrhea/fever, changes in weight or activity level None reported   Tobacco or alcohol use Patient reports smoking 0 PPD  Patient reports having 0 drinks per day   Upcoming surgeries or procedures None reported     Assessment/Plan:  Patient's INR was therapeutic today (2.9). Patient denies any missed/extra doses, diet changes, or med changes since last visit. Patient was instructed to continue warfarin 7.5 mg daily, except 10 mg on Fridays. Repeat INR in 8 weeks (stable >1 year). Patient was reminded to maintain consistent vitamin K intake and call with any bleeding, medication changes, or fever/vomiting/diarrhea. Patient understands dosing directions and information discussed. Dosing schedule and follow up appointment given to patient. Progress note routed to referring physician's office. Patient acknowledges working in consult agreement with pharmacist as referred by his/her physician. Next Clinic Appointment:  8/3    Please call [facility] at [phone] with any questions. Thanks! Verona Pagan.  Keyona Jacobs, PharmD, BCPS  [facility]  Ph: [phone]  6/8/2020 4:12 PM    CLINICAL PHARMACY CONSULT: MED RECONCILIATION/REVIEW ADDENDUM    For Pharmacy Admin Tracking Only    PHSO: No  Total # of Interventions Recommended: 0  - Maintenance Safety Lab Monitoring #: 1  Total Interventions Accepted: 0  Time Spent (min): 15

## 2020-08-03 ENCOUNTER — ANTI-COAG VISIT (OUTPATIENT)
Dept: PHARMACY | Age: 55
End: 2020-08-03
Payer: COMMERCIAL

## 2020-08-03 LAB — INTERNATIONAL NORMALIZATION RATIO, POC: 3.4

## 2020-08-03 PROCEDURE — 99212 OFFICE O/P EST SF 10 MIN: CPT

## 2020-08-03 PROCEDURE — 85610 PROTHROMBIN TIME: CPT

## 2020-08-03 NOTE — PROGRESS NOTES
Kelly Cote is a 54 y.o. male with PMHx significant for chronic DVT who presents to clinic 8/3/2020 for anticoagulation monitoring and adjustment. Patient has been taking warfarin for 15+ years.      Anticoagulation Indication(s):  DVT    Referring Physician:  Dr. Kay Charlton  Goal INR Range:  2-3  Duration of Anticoagulation Therapy:  Indefinite  Time of day dose taken:  AM  Product patient has at home:  warfarin 5 mg (peach)    INR Summary                            Warfarin regimen (mg)  Date INR   A/P    Sun Mon Tue Wed Thu Fri Sat Mg/wk  8/3 3.4 Above goal, decrease  7.5 7.5 7.5 7.5 7.5 7.5 7.5 52.5  6/8 2.9 At goal, no change  7.5 7.5 7.5 7.5 7.5 10 7.5 55  2/28 2.7 At goal, no change  7.5 7.5 7.5 7.5 7.5 10 7.5 55  1/3 2.6 At goal, no change  7.5 7.5 7.5 7.5 7.5 10 7.5 55  11/20 2.8 At goal, no change  7.5 7.5 7.5 7.5 7.5 10 7.5 55  10/23 2.6 At goal, no change  7.5 7.5 7.5 7.5 7.5 10 7.5 55  8/23 2.2 At goal, no change  7.5 7.5 7.5 7.5 7.5 10 7.5 55  7/26 2.5 At goal, no change  7.5 7.5 7.5 7.5 7.5 10 7.5 55  6/28 2.3 At goal, no change  7.5 7.5 7.5 7.5 7.5 10 7.5 55  5/31 2.6 At goal, no change  7.5 7.5 7.5 7.5 7.5 10 7.5 55  5/1 2.5 At goal, no change  7.5 7.5 7.5 7.5 7.5 10 7.5 55  4/10 2.0 At goal, no change  7.5 7.5 7.5 7.5 7.5 10 7.5 55  3/13 2.6 At goal, no change  7.5 7.5 7.5 7.5 7.5 10 7.5 55  2/27 2.7 At goal, no change  7.5 7.5 7.5 7.5 7.5 10 7.5 55  2/20 2.9 At goal, no change  7.5 7.5 7.5 7.5 7.5 10 7.5 55  2/13 2.1 At goal, no change  7.5 7.5 7.5 7.5 7.5 10 7.5 55    Last CBC:  Lab Results   Component Value Date    RBC 3.37 (L) 04/28/2018    HGB 8.3 (L) 04/28/2018    HCT 25.4 (L) 04/28/2018    MCV 75.4 (L) 04/28/2018    MCH 24.6 (L) 04/28/2018    MPV 6.8 04/28/2018    RDW 18.3 (H) 04/28/2018     04/28/2018     Patient History:  Recent hospitalizations/HC visits 1/4/19 bilateral LE venous duplex showed acute totally occluding DVT of the left SFV, popliteal, gastrocnemius, and posterior tibial veins   Recent medication changes None reported (currently off doxycycline)   Medications taken regularly that may interact with warfarin or alter INR None   Warfarin dose taken as prescribed Yes - does not use pillbox (only takes warfarin)   Signs/symptoms of bleeding No h/o bleeding reported   Vitamin K intake Normally avoids high vitamin K foods: ~0-1 serving per week   Recent vomiting/diarrhea/fever, changes in weight or activity level None reported   Tobacco or alcohol use Patient reports smoking 0 PPD  Patient reports having 0 drinks per day   Upcoming surgeries or procedures None reported     Assessment/Plan:  Patient's INR was supratherapeutic today (3.4), and has been trending upward gradually over the past few visits. Patient denies any missed/extra doses, diet changes, medication changes (only takes warfarin), ABX/steroids, illness, bleeding, etc since last visit. Patient was instructed to decrease warfarin dose to 7.5 mg daily. Repeat INR in 2 weeks to assess new dosage. Patient was reminded to maintain consistent vitamin K intake and call with any bleeding, medication changes, or fever/vomiting/diarrhea. Patient understands dosing directions and information discussed. Dosing schedule and follow up appointment given to patient. Progress note routed to referring physician's office. Patient acknowledges working in consult agreement with pharmacist as referred by his/her physician. Next Clinic Appointment:  8/17    Please call [place] [address] [facility] at [phone] with any questions. Thanks! Daljit Fuentes.  Arnaldo Rosa, PharmD, BCPS  [place] [address] [facility]  Ph: [phone]  8/3/2020 1:06 PM    CLINICAL PHARMACY CONSULT: MED RECONCILIATION/REVIEW ADDENDUM    For Pharmacy Admin Tracking Only    PHSO: No  Total # of Interventions Recommended: 1  - Decreased Dose #: 1  - Maintenance Safety Lab Monitoring #: 1  Total Interventions Accepted:

## 2020-08-17 ENCOUNTER — ANTI-COAG VISIT (OUTPATIENT)
Dept: PHARMACY | Age: 55
End: 2020-08-17
Payer: COMMERCIAL

## 2020-08-17 LAB — INTERNATIONAL NORMALIZATION RATIO, POC: 2.8

## 2020-08-17 PROCEDURE — 99211 OFF/OP EST MAY X REQ PHY/QHP: CPT

## 2020-08-17 PROCEDURE — 85610 PROTHROMBIN TIME: CPT

## 2020-08-17 NOTE — PROGRESS NOTES
Estela Banuelos is a 54 y.o. male with PMHx significant for chronic DVT who presents to clinic 8/17/2020 for anticoagulation monitoring and adjustment. Patient has been taking warfarin for 15+ years.      Anticoagulation Indication(s):  DVT    Referring Physician:  Dr. Jesse Mancuso  Goal INR Range:  2-3  Duration of Anticoagulation Therapy:  Indefinite  Time of day dose taken:  AM  Product patient has at home:  warfarin 5 mg (peach)    INR Summary                            Warfarin regimen (mg)  Date INR   A/P    Sun Mon Tue Wed Thu Fri Sat Mg/wk  8/17 2.8 At goal, no change  7.5 7.5 7.5 7.5 7.5 7.5 7.5 52.5  8/3 3.4 Above goal, decrease  7.5 7.5 7.5 7.5 7.5 7.5 7.5 52.5  6/8 2.9 At goal, no change  7.5 7.5 7.5 7.5 7.5 10 7.5 55  2/28 2.7 At goal, no change  7.5 7.5 7.5 7.5 7.5 10 7.5 55  1/3 2.6 At goal, no change  7.5 7.5 7.5 7.5 7.5 10 7.5 55  11/20 2.8 At goal, no change  7.5 7.5 7.5 7.5 7.5 10 7.5 55  10/23 2.6 At goal, no change  7.5 7.5 7.5 7.5 7.5 10 7.5 55  8/23 2.2 At goal, no change  7.5 7.5 7.5 7.5 7.5 10 7.5 55  7/26 2.5 At goal, no change  7.5 7.5 7.5 7.5 7.5 10 7.5 55  6/28 2.3 At goal, no change  7.5 7.5 7.5 7.5 7.5 10 7.5 55  5/31 2.6 At goal, no change  7.5 7.5 7.5 7.5 7.5 10 7.5 55  5/1 2.5 At goal, no change  7.5 7.5 7.5 7.5 7.5 10 7.5 55  4/10 2.0 At goal, no change  7.5 7.5 7.5 7.5 7.5 10 7.5 55  3/13 2.6 At goal, no change  7.5 7.5 7.5 7.5 7.5 10 7.5 55  2/27 2.7 At goal, no change  7.5 7.5 7.5 7.5 7.5 10 7.5 55  2/20 2.9 At goal, no change  7.5 7.5 7.5 7.5 7.5 10 7.5 55  2/13 2.1 At goal, no change  7.5 7.5 7.5 7.5 7.5 10 7.5 55    Last CBC:  Lab Results   Component Value Date    RBC 3.37 (L) 04/28/2018    HGB 8.3 (L) 04/28/2018    HCT 25.4 (L) 04/28/2018    MCV 75.4 (L) 04/28/2018    MCH 24.6 (L) 04/28/2018    MPV 6.8 04/28/2018    RDW 18.3 (H) 04/28/2018     04/28/2018     Patient History:  Recent hospitalizations/HC visits 1/4/19 bilateral LE venous duplex showed acute totally occluding DVT of the left SFV, popliteal, gastrocnemius, and posterior tibial veins   Recent medication changes None reported (currently off doxycycline)   Medications taken regularly that may interact with warfarin or alter INR None   Warfarin dose taken as prescribed Yes - does not use pillbox (only takes warfarin)   Signs/symptoms of bleeding No h/o bleeding reported   Vitamin K intake Normally avoids high vitamin K foods: ~0-1 serving per week   Recent vomiting/diarrhea/fever, changes in weight or activity level None reported   Tobacco or alcohol use Patient reports smoking 0 PPD  Patient reports having 0 drinks per day   Upcoming surgeries or procedures None reported     Assessment/Plan:  Patient's INR was therapeutic today (2.8) following warfarin dose reduction at last visit. Patient denies any missed/extra doses, diet changes, medication changes (only takes warfarin), illness, bleeding, etc since last visit. Patient was instructed to continue warfarin 7.5 mg daily. Repeat INR in 3 weeks. Patient was reminded to maintain consistent vitamin K intake and call with any bleeding, medication changes, or fever/vomiting/diarrhea. Patient understands dosing directions and information discussed. Dosing schedule and follow up appointment given to patient. Progress note routed to referring physician's office. Patient acknowledges working in consult agreement with pharmacist as referred by his/her physician. Next Clinic Appointment:  9/9    Please call [facility] at [phone] with any questions. Thanks! Tomasqueakosua Due.  Jerome Cat, PharmD, [facility]  [facility]  Ph: [phone]  8/17/2020 1:32 PM    CLINICAL PHARMACY CONSULT: MED RECONCILIATION/REVIEW ADDENDUM    For Pharmacy Admin Tracking Only    PHSO: No  Total # of Interventions Recommended: 0  - Maintenance Safety Lab Monitoring #: 1  Total Interventions Accepted: 0  Time Spent (min): 15

## 2020-09-09 ENCOUNTER — ANTI-COAG VISIT (OUTPATIENT)
Dept: PHARMACY | Age: 55
End: 2020-09-09
Payer: COMMERCIAL

## 2020-09-09 LAB — INTERNATIONAL NORMALIZATION RATIO, POC: 2.7

## 2020-09-09 PROCEDURE — 99211 OFF/OP EST MAY X REQ PHY/QHP: CPT

## 2020-09-09 PROCEDURE — 85610 PROTHROMBIN TIME: CPT

## 2020-09-09 NOTE — PROGRESS NOTES
Searfin Jeong is a 54 y.o. male with PMHx significant for chronic DVT who presents to clinic 9/9/2020 for anticoagulation monitoring and adjustment. Patient has been taking warfarin for 15+ years.      Anticoagulation Indication(s):  DVT    Referring Physician:  Dr. Elizabeth Pascual  Goal INR Range:  2-3  Duration of Anticoagulation Therapy:  Indefinite  Time of day dose taken:  AM  Product patient has at home:  warfarin 5 mg (peach)    INR Summary                            Warfarin regimen (mg)  Date INR   A/P    Sun Mon Tue Wed Thu Fri Sat Mg/wk  9/9 2.7 At goal, no change  7.5 7.5 7.5 7.5 7.5 7.5 7.5 52.5  8/17 2.8 At goal, no change  7.5 7.5 7.5 7.5 7.5 7.5 7.5 52.5  8/3 3.4 Above goal, decrease  7.5 7.5 7.5 7.5 7.5 7.5 7.5 52.5  6/8 2.9 At goal, no change  7.5 7.5 7.5 7.5 7.5 10 7.5 55  2/28 2.7 At goal, no change  7.5 7.5 7.5 7.5 7.5 10 7.5 55  1/3 2.6 At goal, no change  7.5 7.5 7.5 7.5 7.5 10 7.5 55  11/20 2.8 At goal, no change  7.5 7.5 7.5 7.5 7.5 10 7.5 55  10/23 2.6 At goal, no change  7.5 7.5 7.5 7.5 7.5 10 7.5 55  8/23 2.2 At goal, no change  7.5 7.5 7.5 7.5 7.5 10 7.5 55  7/26 2.5 At goal, no change  7.5 7.5 7.5 7.5 7.5 10 7.5 55  6/28 2.3 At goal, no change  7.5 7.5 7.5 7.5 7.5 10 7.5 55  5/31 2.6 At goal, no change  7.5 7.5 7.5 7.5 7.5 10 7.5 55  5/1 2.5 At goal, no change  7.5 7.5 7.5 7.5 7.5 10 7.5 55  4/10 2.0 At goal, no change  7.5 7.5 7.5 7.5 7.5 10 7.5 55  3/13 2.6 At goal, no change  7.5 7.5 7.5 7.5 7.5 10 7.5 55  2/27 2.7 At goal, no change  7.5 7.5 7.5 7.5 7.5 10 7.5 55  2/20 2.9 At goal, no change  7.5 7.5 7.5 7.5 7.5 10 7.5 55  2/13 2.1 At goal, no change  7.5 7.5 7.5 7.5 7.5 10 7.5 55    Last CBC:  Lab Results   Component Value Date    RBC 3.37 (L) 04/28/2018    HGB 8.3 (L) 04/28/2018    HCT 25.4 (L) 04/28/2018    MCV 75.4 (L) 04/28/2018    MCH 24.6 (L) 04/28/2018    MPV 6.8 04/28/2018    RDW 18.3 (H) 04/28/2018     04/28/2018     Patient History:  Recent hospitalizations/HC visits 1/4/19 bilateral LE venous duplex showed acute totally occluding DVT of the left SFV, popliteal, gastrocnemius, and posterior tibial veins   Recent medication changes None reported (currently off doxycycline)   Medications taken regularly that may interact with warfarin or alter INR None   Warfarin dose taken as prescribed Yes - does not use pillbox (only takes warfarin)   Signs/symptoms of bleeding No h/o bleeding reported   Vitamin K intake Normally avoids high vitamin K foods: ~0-1 serving per week   Recent vomiting/diarrhea/fever, changes in weight or activity level None reported   Tobacco or alcohol use Patient reports smoking 0 PPD  Patient reports having 0 drinks per day   Upcoming surgeries or procedures None reported     Assessment/Plan:  Patient's INR was therapeutic today (2.7) for second consecutive visit following warfarin dose reduction on 8/3. Patient denies any missed/extra doses, diet changes, medication changes (only takes warfarin), illness, bleeding, etc since last visit. Patient was instructed to continue warfarin 7.5 mg daily. Repeat INR in 4 weeks. Patient was reminded to maintain consistent vitamin K intake and call with any bleeding, medication changes, or fever/vomiting/diarrhea. Patient understands dosing directions and information discussed. Dosing schedule and follow up appointment given to patient. Progress note routed to referring physician's office. Patient acknowledges working in consult agreement with pharmacist as referred by his/her physician. Next Clinic Appointment:  10/7    Please call [facility] at [phone] with any questions. Thanks! Lucho Gracia.  Jay Hilario, PharmD, BCPS  [facility]  Ph: [phone]  9/9/2020 11:31 AM    CLINICAL PHARMACY CONSULT: MED RECONCILIATION/REVIEW ADDENDUM    For Pharmacy Admin Tracking Only    PHSO: No  Total # of Interventions Recommended: 0  - Maintenance Safety Lab Monitoring #: 1  Total Interventions Accepted: 0  Time Spent (min): 15

## 2020-09-28 NOTE — TELEPHONE ENCOUNTER
Requested Prescriptions     Pending Prescriptions Disp Refills    warfarin (COUMADIN) 5 MG tablet [Pharmacy Med Name: WARFARIN SODIUM 5 MG TABLET] 48 tablet 2     Sig: TAKE ONE AND ONE- HALF TABLETS BY MOUTH  DAILY  EXCEPT FRIDAYS  TAKE TWO TABLETS ON FRIDAYS     LOV:12/14/2018  Adia Jordan

## 2020-09-29 RX ORDER — WARFARIN SODIUM 5 MG/1
TABLET ORAL
Qty: 48 TABLET | Refills: 2 | Status: SHIPPED | OUTPATIENT
Start: 2020-09-29 | End: 2021-01-04

## 2020-10-07 ENCOUNTER — ANTI-COAG VISIT (OUTPATIENT)
Dept: PHARMACY | Age: 55
End: 2020-10-07
Payer: COMMERCIAL

## 2020-10-07 LAB — INTERNATIONAL NORMALIZATION RATIO, POC: 2.7

## 2020-10-07 PROCEDURE — 99211 OFF/OP EST MAY X REQ PHY/QHP: CPT

## 2020-10-07 PROCEDURE — 85610 PROTHROMBIN TIME: CPT

## 2020-10-07 NOTE — PROGRESS NOTES
Robyn Ruelas is a 54 y.o. male with PMHx significant for chronic DVT who presents to clinic 10/7/2020 for anticoagulation monitoring and adjustment. Patient has been taking warfarin for 15+ years.      Anticoagulation Indication(s):  DVT    Referring Physician:  Dr. Toshia Rosa  Goal INR Range:  2-3  Duration of Anticoagulation Therapy:  Indefinite  Time of day dose taken:  AM  Product patient has at home:  warfarin 5 mg (peach)    INR Summary                            Warfarin regimen (mg)  Date INR   A/P    Sun Mon Tue Wed Thu Fri Sat Mg/wk  10/7 2.7 At goal, no change  7.5 7.5 7.5 7.5 7.5 7.5 7.5 52.5  9/9 2.7 At goal, no change  7.5 7.5 7.5 7.5 7.5 7.5 7.5 52.5  8/17 2.8 At goal, no change  7.5 7.5 7.5 7.5 7.5 7.5 7.5 52.5  8/3 3.4 Above goal, decrease  7.5 7.5 7.5 7.5 7.5 7.5 7.5 52.5  6/8 2.9 At goal, no change  7.5 7.5 7.5 7.5 7.5 10 7.5 55  2/28 2.7 At goal, no change  7.5 7.5 7.5 7.5 7.5 10 7.5 55  1/3 2.6 At goal, no change  7.5 7.5 7.5 7.5 7.5 10 7.5 55  11/20 2.8 At goal, no change  7.5 7.5 7.5 7.5 7.5 10 7.5 55  10/23 2.6 At goal, no change  7.5 7.5 7.5 7.5 7.5 10 7.5 55  8/23 2.2 At goal, no change  7.5 7.5 7.5 7.5 7.5 10 7.5 55  7/26 2.5 At goal, no change  7.5 7.5 7.5 7.5 7.5 10 7.5 55  6/28 2.3 At goal, no change  7.5 7.5 7.5 7.5 7.5 10 7.5 55  5/31 2.6 At goal, no change  7.5 7.5 7.5 7.5 7.5 10 7.5 55  5/1 2.5 At goal, no change  7.5 7.5 7.5 7.5 7.5 10 7.5 55  4/10 2.0 At goal, no change  7.5 7.5 7.5 7.5 7.5 10 7.5 55  3/13 2.6 At goal, no change  7.5 7.5 7.5 7.5 7.5 10 7.5 55  2/27 2.7 At goal, no change  7.5 7.5 7.5 7.5 7.5 10 7.5 55  2/20 2.9 At goal, no change  7.5 7.5 7.5 7.5 7.5 10 7.5 55  2/13 2.1 At goal, no change  7.5 7.5 7.5 7.5 7.5 10 7.5 55    Last CBC:  Lab Results   Component Value Date    RBC 3.37 (L) 04/28/2018    HGB 8.3 (L) 04/28/2018    HCT 25.4 (L) 04/28/2018    MCV 75.4 (L) 04/28/2018    MCH 24.6 (L) 04/28/2018    MPV 6.8 04/28/2018    RDW 18.3 (H) 04/28/2018     04/28/2018     Patient History:  Recent hospitalizations/HC visits 1/4/19 bilateral LE venous duplex showed acute totally occluding DVT of the left SFV, popliteal, gastrocnemius, and posterior tibial veins   Recent medication changes None reported (currently off doxycycline)   Medications taken regularly that may interact with warfarin or alter INR None   Warfarin dose taken as prescribed Yes - does not use pillbox (only takes warfarin)   Signs/symptoms of bleeding No h/o bleeding reported   Vitamin K intake Normally avoids high vitamin K foods: ~0-1 serving per week   Recent vomiting/diarrhea/fever, changes in weight or activity level None reported   Tobacco or alcohol use Patient reports smoking 0 PPD  Patient reports having 0 drinks per day   Upcoming surgeries or procedures None reported     Assessment/Plan:  Patient's INR was therapeutic today (2.7) for third consecutive visit following warfarin dose reduction on 8/3. Patient denies any missed/extra doses, diet changes, medication changes (only takes warfarin), illness, bleeding, etc since last visit. Patient was instructed to continue warfarin 7.5 mg daily. Repeat INR in 4 weeks. Patient was reminded to maintain consistent vitamin K intake and call with any bleeding, medication changes, or fever/vomiting/diarrhea. Patient understands dosing directions and information discussed. Dosing schedule and follow up appointment given to patient. Progress note routed to referring physician's office. Patient acknowledges working in consult agreement with pharmacist as referred by his/her physician. Next Clinic Appointment:  11/4    Please call [facility] at [phone] with any questions. Thanks! Nikki Page.  Ray MadsenD, BCPS  [facility]  Ph: [phone]  10/7/2020 11:24 AM    CLINICAL PHARMACY CONSULT: MED RECONCILIATION/REVIEW ADDENDUM    For Pharmacy Admin Tracking Only    PHSO: No  Total # of Interventions Recommended: 0  - Maintenance Safety Lab Monitoring #: 1  Total Interventions Accepted: 0  Time Spent (min): 15

## 2020-11-04 ENCOUNTER — ANTI-COAG VISIT (OUTPATIENT)
Dept: PHARMACY | Age: 55
End: 2020-11-04
Payer: COMMERCIAL

## 2020-11-04 LAB — INTERNATIONAL NORMALIZATION RATIO, POC: 3.1

## 2020-11-04 PROCEDURE — 85610 PROTHROMBIN TIME: CPT

## 2020-11-04 PROCEDURE — 99211 OFF/OP EST MAY X REQ PHY/QHP: CPT

## 2020-11-04 NOTE — PROGRESS NOTES
ANTICOAGULATION SERVICE    Leta Santo is a 54 y.o. male with PMHx significant for chronic DVT (last in Jan, 2019) who presents to clinic 11/4/2020 for anticoagulation monitoring and adjustment. Patient has been taking warfarin for 15+ years.      Anticoagulation Indication(s):  DVT    Referring Physician:  Dr. Velia Lazo  Goal INR Range:  2-3  Duration of Anticoagulation Therapy:  Indefinite  Time of day dose taken:  AM  Product patient has at home:  warfarin 5 mg (peach)    INR Summary                            Warfarin regimen (mg)  Date INR   A/P    Sun Mon Tue Wed Thu Fri Sat Mg/wk  11/4 3.1 Above goal, continue  7.5 7.5 7.5 7.5 7.5 7.5 7.5 52.5  10/7 2.7 At goal, no change  7.5 7.5 7.5 7.5 7.5 7.5 7.5 52.5  9/9 2.7 At goal, no change  7.5 7.5 7.5 7.5 7.5 7.5 7.5 52.5  8/17 2.8 At goal, no change  7.5 7.5 7.5 7.5 7.5 7.5 7.5 52.5  8/3 3.4 Above goal, decrease  7.5 7.5 7.5 7.5 7.5 7.5 7.5 52.5  6/8 2.9 At goal, no change  7.5 7.5 7.5 7.5 7.5 10 7.5 55  2/28 2.7 At goal, no change  7.5 7.5 7.5 7.5 7.5 10 7.5 55  1/3 2.6 At goal, no change  7.5 7.5 7.5 7.5 7.5 10 7.5 55  11/20 2.8 At goal, no change  7.5 7.5 7.5 7.5 7.5 10 7.5 55  10/23 2.6 At goal, no change  7.5 7.5 7.5 7.5 7.5 10 7.5 55  8/23 2.2 At goal, no change  7.5 7.5 7.5 7.5 7.5 10 7.5 55  7/26 2.5 At goal, no change  7.5 7.5 7.5 7.5 7.5 10 7.5 55  6/28 2.3 At goal, no change  7.5 7.5 7.5 7.5 7.5 10 7.5 55  5/31 2.6 At goal, no change  7.5 7.5 7.5 7.5 7.5 10 7.5 55  5/1 2.5 At goal, no change  7.5 7.5 7.5 7.5 7.5 10 7.5 55  4/10 2.0 At goal, no change  7.5 7.5 7.5 7.5 7.5 10 7.5 55  3/13 2.6 At goal, no change  7.5 7.5 7.5 7.5 7.5 10 7.5 55  2/27 2.7 At goal, no change  7.5 7.5 7.5 7.5 7.5 10 7.5 55  2/20 2.9 At goal, no change  7.5 7.5 7.5 7.5 7.5 10 7.5 55  2/13 2.1 At goal, no change  7.5 7.5 7.5 7.5 7.5 10 7.5 55    Last CBC:  Lab Results   Component Value Date    RBC 3.37 (L) 04/28/2018    HGB 8.3 (L) 04/28/2018    HCT 25.4 (L) 04/28/2018    MCV 75.4 (L) 04/28/2018    MCH 24.6 (L) 04/28/2018    MPV 6.8 04/28/2018    RDW 18.3 (H) 04/28/2018     04/28/2018     Patient History:  Recent hospitalizations/HC visits None reported   Recent medication changes None reported (currently off doxycycline)   Medications taken regularly that may interact with warfarin or alter INR None   Warfarin dose taken as prescribed Yes - does not use pillbox (only takes warfarin)   Signs/symptoms of bleeding No h/o bleeding reported   Vitamin K intake Normally avoids high vitamin K foods: ~0-1 serving per week   Recent vomiting/diarrhea/fever, changes in weight or activity level None reported   Tobacco or alcohol use Patient reports smoking 0 PPD  Patient reports having 0 drinks per day   Upcoming surgeries or procedures None reported     Assessment/Plan:  Patient's INR was slightly supratherapeutic today (3.1), unknown etiology. Patient denies any missed/extra doses, diet changes, medication changes (only takes warfarin), illness, bleeding, etc since last visit. Patient did take ibuprofen last week for foot pain, but it does not affect the INR level. Patient was instructed to avoid NSAIDs with warfarin and take Tylenol instead for pain. Patient was instructed to continue warfarin 7.5 mg daily. Repeat INR in 4 weeks. Patient was reminded to maintain consistent vitamin K intake and call with any bleeding, medication changes, or fever/vomiting/diarrhea. Patient understands dosing directions and information discussed. Dosing schedule and follow up appointment given to patient. Progress note routed to referring physician's office. Patient acknowledges working in consult agreement with pharmacist as referred by his/her physician. Next Clinic Appointment:  12/2    Please call [facility] at [phone] with any questions. Thanks! Mariposa Artis.  Didi Charles, PharmD, BCPS  [facility]  Ph: [phone]  11/4/2020 11:29 AM    CLINICAL PHARMACY CONSULT: MED RECONCILIATION/REVIEW ADDENDUM    For Pharmacy Admin Tracking Only    PHSO: No  Total # of Interventions Recommended: 0  - Maintenance Safety Lab Monitoring #: 1  Total Interventions Accepted: 0  Time Spent (min): 15

## 2020-12-02 ENCOUNTER — ANTI-COAG VISIT (OUTPATIENT)
Dept: PHARMACY | Age: 55
End: 2020-12-02
Payer: COMMERCIAL

## 2020-12-02 LAB — INTERNATIONAL NORMALIZATION RATIO, POC: 2.9

## 2020-12-02 PROCEDURE — 99211 OFF/OP EST MAY X REQ PHY/QHP: CPT

## 2020-12-02 PROCEDURE — 85610 PROTHROMBIN TIME: CPT

## 2020-12-02 NOTE — PROGRESS NOTES
ANTICOAGULATION SERVICE    Paul Ponce is a 54 y.o. male with PMHx significant for chronic DVT (last in Jan, 2019) who presents to clinic 12/2/2020 for anticoagulation monitoring and adjustment. Patient has been taking warfarin for 15+ years.      Anticoagulation Indication(s):  DVT    Referring Physician:  Dr. Nakul Tomlinson  Goal INR Range:  2-3  Duration of Anticoagulation Therapy:  Indefinite  Time of day dose taken:  AM  Product patient has at home:  warfarin 5 mg (peach)    INR Summary                            Warfarin regimen (mg)  Date INR   A/P    Sun Mon Tue Wed Thu Fri Sat Mg/wk  12/2 2.9 At goal, no change  7.5 7.5 7.5 7.5 7.5 7.5 7.5 52.5  11/4 3.1 Above goal, continue  7.5 7.5 7.5 7.5 7.5 7.5 7.5 52.5  10/7 2.7 At goal, no change  7.5 7.5 7.5 7.5 7.5 7.5 7.5 52.5  9/9 2.7 At goal, no change  7.5 7.5 7.5 7.5 7.5 7.5 7.5 52.5  8/17 2.8 At goal, no change  7.5 7.5 7.5 7.5 7.5 7.5 7.5 52.5  8/3 3.4 Above goal, decrease  7.5 7.5 7.5 7.5 7.5 7.5 7.5 52.5  6/8 2.9 At goal, no change  7.5 7.5 7.5 7.5 7.5 10 7.5 55  2/28 2.7 At goal, no change  7.5 7.5 7.5 7.5 7.5 10 7.5 55  1/3 2.6 At goal, no change  7.5 7.5 7.5 7.5 7.5 10 7.5 55  11/20 2.8 At goal, no change  7.5 7.5 7.5 7.5 7.5 10 7.5 55  10/23 2.6 At goal, no change  7.5 7.5 7.5 7.5 7.5 10 7.5 55  8/23 2.2 At goal, no change  7.5 7.5 7.5 7.5 7.5 10 7.5 55  7/26 2.5 At goal, no change  7.5 7.5 7.5 7.5 7.5 10 7.5 55  6/28 2.3 At goal, no change  7.5 7.5 7.5 7.5 7.5 10 7.5 55  5/31 2.6 At goal, no change  7.5 7.5 7.5 7.5 7.5 10 7.5 55  5/1 2.5 At goal, no change  7.5 7.5 7.5 7.5 7.5 10 7.5 55  4/10 2.0 At goal, no change  7.5 7.5 7.5 7.5 7.5 10 7.5 55  3/13 2.6 At goal, no change  7.5 7.5 7.5 7.5 7.5 10 7.5 55  2/27 2.7 At goal, no change  7.5 7.5 7.5 7.5 7.5 10 7.5 55  2/20 2.9 At goal, no change  7.5 7.5 7.5 7.5 7.5 10 7.5 55  2/13 2.1 At goal, no change  7.5 7.5 7.5 7.5 7.5 10 7.5 55    Last CBC:  Lab Results   Component Value Date    RBC 3.37 (L) 04/28/2018 HGB 8.3 (L) 04/28/2018    HCT 25.4 (L) 04/28/2018    MCV 75.4 (L) 04/28/2018    MCH 24.6 (L) 04/28/2018    MPV 6.8 04/28/2018    RDW 18.3 (H) 04/28/2018     04/28/2018     Patient History:  Recent hospitalizations/HC visits None reported   Recent medication changes None reported (currently off doxycycline)   Medications taken regularly that may interact with warfarin or alter INR None   Warfarin dose taken as prescribed Yes - does not use pillbox (only takes warfarin)   Signs/symptoms of bleeding No h/o bleeding reported   Vitamin K intake Normally avoids high vitamin K foods: ~0-1 serving per week   Recent vomiting/diarrhea/fever, changes in weight or activity level None reported   Tobacco or alcohol use Patient reports smoking 0 PPD  Patient reports having 0 drinks per day   Upcoming surgeries or procedures None reported     Assessment/Plan:  Patient's INR was therapeutic today (2.9). Patient denies any missed/extra doses, diet changes, medication changes (only takes warfarin), illness, bleeding, etc since last visit. Patient was instructed to continue warfarin 7.5 mg daily. Repeat INR in 5 weeks (patient prefers to extend interval). Patient was reminded to maintain consistent vitamin K intake and call with any bleeding, medication changes, or fever/vomiting/diarrhea. Patient understands dosing directions and information discussed. Dosing schedule and follow up appointment given to patient. Progress note routed to referring physician's office. Patient acknowledges working in consult agreement with pharmacist as referred by his/her physician. Next Clinic Appointment:  1/6    Please call [facility] at [phone] with any questions. Thanks! Jeannie Hemphill, PharmD, BCPS  [facility]  Ph: [phone]  12/2/2020 11:28 AM    CLINICAL PHARMACY CONSULT: MED RECONCILIATION/REVIEW ADDENDUM    For Pharmacy Admin Tracking Only    PHSO: No  Total # of Interventions Recommended: 0  - Maintenance Safety Lab Monitoring #: 1  Total Interventions Accepted: 0  Time Spent (min): 15

## 2021-01-01 DIAGNOSIS — I82.423 THROMBOSIS OF BOTH ILIAC VEINS (HCC): ICD-10-CM

## 2021-01-01 DIAGNOSIS — Z79.01 CHRONIC ANTICOAGULATION: ICD-10-CM

## 2021-01-01 DIAGNOSIS — I82.220 THROMBOSIS OF INFERIOR VENA CAVA (HCC): ICD-10-CM

## 2021-01-01 DIAGNOSIS — I82.5Z1 CHRONIC VENOUS EMBOLISM AND THROMBOSIS OF DEEP VESSELS OF DISTAL END OF RIGHT LOWER EXTREMITY (HCC): ICD-10-CM

## 2021-01-04 RX ORDER — WARFARIN SODIUM 5 MG/1
TABLET ORAL
Qty: 48 TABLET | Refills: 0 | Status: SHIPPED | OUTPATIENT
Start: 2021-01-04 | End: 2021-02-01

## 2021-01-06 ENCOUNTER — ANTI-COAG VISIT (OUTPATIENT)
Dept: PHARMACY | Age: 56
End: 2021-01-06
Payer: COMMERCIAL

## 2021-01-06 DIAGNOSIS — I82.5Z1 CHRONIC VENOUS EMBOLISM AND THROMBOSIS OF DEEP VESSELS OF DISTAL END OF RIGHT LOWER EXTREMITY (HCC): ICD-10-CM

## 2021-01-06 LAB — INTERNATIONAL NORMALIZATION RATIO, POC: 2.8

## 2021-01-06 PROCEDURE — 99211 OFF/OP EST MAY X REQ PHY/QHP: CPT

## 2021-01-06 PROCEDURE — 85610 PROTHROMBIN TIME: CPT

## 2021-01-06 NOTE — PROGRESS NOTES
ANTICOAGULATION SERVICE    Patricia Martinez is a 54 y.o. male with PMHx significant for chronic DVT (last in Jan, 2019) who presents to clinic 1/6/2021 for anticoagulation monitoring and adjustment. Patient has been taking warfarin for 15+ years.      Anticoagulation Indication(s):  DVT    Referring Physician:  Dr. Thomas Bocanegra  Goal INR Range:  2-3  Duration of Anticoagulation Therapy:  Indefinite  Time of day dose taken:  AM  Product patient has at home:  warfarin 5 mg (peach)    INR Summary                            Warfarin regimen (mg)  Date INR   A/P    Sun Mon Tue Wed Thu Fri Sat Mg/wk  1/6 2.8 At goal, no change  7.5 7.5 7.5 7.5 7.5 7.5 7.5 52.5  12/2 2.9 At goal, no change  7.5 7.5 7.5 7.5 7.5 7.5 7.5 52.5  11/4 3.1 Above goal, continue  7.5 7.5 7.5 7.5 7.5 7.5 7.5 52.5  10/7 2.7 At goal, no change  7.5 7.5 7.5 7.5 7.5 7.5 7.5 52.5  9/9 2.7 At goal, no change  7.5 7.5 7.5 7.5 7.5 7.5 7.5 52.5  8/17 2.8 At goal, no change  7.5 7.5 7.5 7.5 7.5 7.5 7.5 52.5  8/3 3.4 Above goal, decrease  7.5 7.5 7.5 7.5 7.5 7.5 7.5 52.5  6/8 2.9 At goal, no change  7.5 7.5 7.5 7.5 7.5 10 7.5 55  2/28 2.7 At goal, no change  7.5 7.5 7.5 7.5 7.5 10 7.5 55  1/3 2.6 At goal, no change  7.5 7.5 7.5 7.5 7.5 10 7.5 55  11/20 2.8 At goal, no change  7.5 7.5 7.5 7.5 7.5 10 7.5 55  10/23 2.6 At goal, no change  7.5 7.5 7.5 7.5 7.5 10 7.5 55  8/23 2.2 At goal, no change  7.5 7.5 7.5 7.5 7.5 10 7.5 55  7/26 2.5 At goal, no change  7.5 7.5 7.5 7.5 7.5 10 7.5 55  6/28 2.3 At goal, no change  7.5 7.5 7.5 7.5 7.5 10 7.5 55  5/31 2.6 At goal, no change  7.5 7.5 7.5 7.5 7.5 10 7.5 55  5/1 2.5 At goal, no change  7.5 7.5 7.5 7.5 7.5 10 7.5 55  4/10 2.0 At goal, no change  7.5 7.5 7.5 7.5 7.5 10 7.5 55  3/13 2.6 At goal, no change  7.5 7.5 7.5 7.5 7.5 10 7.5 55  2/27 2.7 At goal, no change  7.5 7.5 7.5 7.5 7.5 10 7.5 55  2/20 2.9 At goal, no change  7.5 7.5 7.5 7.5 7.5 10 7.5 55  2/13 2.1 At goal, no change  7.5 7.5 7.5 7.5 7.5 10 7.5 55    Last CBC:  Lab Results   Component Value Date    RBC 3.37 (L) 04/28/2018    HGB 8.3 (L) 04/28/2018    HCT 25.4 (L) 04/28/2018    MCV 75.4 (L) 04/28/2018    MCH 24.6 (L) 04/28/2018    MPV 6.8 04/28/2018    RDW 18.3 (H) 04/28/2018     04/28/2018     Patient History:  Recent hospitalizations/HC visits None reported   Recent medication changes None reported (currently off doxycycline)   Medications taken regularly that may interact with warfarin or alter INR None   Warfarin dose taken as prescribed Yes - does not use pillbox (only takes warfarin)   Signs/symptoms of bleeding No h/o bleeding reported   Vitamin K intake Normally avoids high vitamin K foods: ~0-1 serving per week   Recent vomiting/diarrhea/fever, changes in weight or activity level None reported   Tobacco or alcohol use Patient reports smoking 0 PPD  Patient reports having 0 drinks per day   Upcoming surgeries or procedures None reported     Assessment/Plan:  Patient's INR was therapeutic today (2.8). Patient denies any missed/extra doses, diet changes, medication changes (only takes warfarin), illness, bleeding, etc since last visit. Patient was instructed to continue warfarin 7.5 mg daily. Repeat INR in 6 weeks (patient prefers to extend interval). Patient was reminded to maintain consistent vitamin K intake and call with any bleeding, medication changes, or fever/vomiting/diarrhea. Patient understands dosing directions and information discussed. Dosing schedule and follow up appointment given to patient. Progress note routed to referring physician's office. Patient acknowledges working in consult agreement with pharmacist as referred by his/her physician. Next Clinic Appointment:  2/17    Please call [facility] at [phone] with any questions. Thanks! Joanna Ortiz.  Buckley Najjar, PharmD, BCPS  [facility]  Ph: [phone]  1/6/2021 11:30 AM    CLINICAL PHARMACY CONSULT: MED RECONCILIATION/REVIEW ADDENDUM    For Pharmacy Admin Tracking Only    PHSO: No  Total # of Interventions Recommended: 0  - Maintenance Safety Lab Monitoring #: 1  Total Interventions Accepted: 0  Time Spent (min): 15

## 2021-02-01 DIAGNOSIS — I82.423 THROMBOSIS OF BOTH ILIAC VEINS (HCC): ICD-10-CM

## 2021-02-01 DIAGNOSIS — I82.220 THROMBOSIS OF INFERIOR VENA CAVA (HCC): ICD-10-CM

## 2021-02-01 DIAGNOSIS — Z79.01 CHRONIC ANTICOAGULATION: ICD-10-CM

## 2021-02-01 DIAGNOSIS — I82.5Z1 CHRONIC VENOUS EMBOLISM AND THROMBOSIS OF DEEP VESSELS OF DISTAL END OF RIGHT LOWER EXTREMITY (HCC): ICD-10-CM

## 2021-02-01 RX ORDER — WARFARIN SODIUM 5 MG/1
TABLET ORAL
Qty: 48 TABLET | Refills: 0 | Status: SHIPPED | OUTPATIENT
Start: 2021-02-01 | End: 2021-03-01

## 2021-02-17 ENCOUNTER — ANTI-COAG VISIT (OUTPATIENT)
Dept: PHARMACY | Age: 56
End: 2021-02-17
Payer: COMMERCIAL

## 2021-02-17 DIAGNOSIS — I82.5Z1 CHRONIC VENOUS EMBOLISM AND THROMBOSIS OF DEEP VESSELS OF DISTAL END OF RIGHT LOWER EXTREMITY (HCC): ICD-10-CM

## 2021-02-17 LAB — INTERNATIONAL NORMALIZATION RATIO, POC: 3

## 2021-02-17 PROCEDURE — 99211 OFF/OP EST MAY X REQ PHY/QHP: CPT

## 2021-02-17 PROCEDURE — 85610 PROTHROMBIN TIME: CPT

## 2021-02-17 NOTE — PROGRESS NOTES
ANTICOAGULATION SERVICE    Dorota Liu is a 54 y.o. male with PMHx significant for chronic DVT (last in Jan, 2019) who presents to clinic 2/17/2021 for anticoagulation monitoring and adjustment. Patient has been taking warfarin for 15+ years.      Anticoagulation Indication(s):  DVT    Referring Physician:  Dr. Margie Rivera  Goal INR Range:  2-3  Duration of Anticoagulation Therapy:  Indefinite  Time of day dose taken:  AM  Product patient has at home:  warfarin 5 mg (peach)    INR Summary                            Warfarin regimen (mg)  Date INR   A/P    Sun Mon Tue Wed Thu Fri Sat Mg/wk  2/17 3.0 At goal, no change  7.5 7.5 7.5 7.5 7.5 7.5 7.5 52.5  1/6 2.8 At goal, no change  7.5 7.5 7.5 7.5 7.5 7.5 7.5 52.5  12/2 2.9 At goal, no change  7.5 7.5 7.5 7.5 7.5 7.5 7.5 52.5  11/4 3.1 Above goal, continue  7.5 7.5 7.5 7.5 7.5 7.5 7.5 52.5  10/7 2.7 At goal, no change  7.5 7.5 7.5 7.5 7.5 7.5 7.5 52.5  9/9 2.7 At goal, no change  7.5 7.5 7.5 7.5 7.5 7.5 7.5 52.5  8/17 2.8 At goal, no change  7.5 7.5 7.5 7.5 7.5 7.5 7.5 52.5  8/3 3.4 Above goal, decrease  7.5 7.5 7.5 7.5 7.5 7.5 7.5 52.5  6/8 2.9 At goal, no change  7.5 7.5 7.5 7.5 7.5 10 7.5 55  2/28 2.7 At goal, no change  7.5 7.5 7.5 7.5 7.5 10 7.5 55  1/3 2.6 At goal, no change  7.5 7.5 7.5 7.5 7.5 10 7.5 55  11/20 2.8 At goal, no change  7.5 7.5 7.5 7.5 7.5 10 7.5 55  10/23 2.6 At goal, no change  7.5 7.5 7.5 7.5 7.5 10 7.5 55  8/23 2.2 At goal, no change  7.5 7.5 7.5 7.5 7.5 10 7.5 55  7/26 2.5 At goal, no change  7.5 7.5 7.5 7.5 7.5 10 7.5 55  6/28 2.3 At goal, no change  7.5 7.5 7.5 7.5 7.5 10 7.5 55  5/31 2.6 At goal, no change  7.5 7.5 7.5 7.5 7.5 10 7.5 55  5/1 2.5 At goal, no change  7.5 7.5 7.5 7.5 7.5 10 7.5 55  4/10 2.0 At goal, no change  7.5 7.5 7.5 7.5 7.5 10 7.5 55  3/13 2.6 At goal, no change  7.5 7.5 7.5 7.5 7.5 10 7.5 55  2/27 2.7 At goal, no change  7.5 7.5 7.5 7.5 7.5 10 7.5 55  2/20 2.9 At goal, no change  7.5 7.5 7.5 7.5 7.5 10 7.5 55  2/13 2.1 At goal, no change  7.5 7.5 7.5 7.5 7.5 10 7.5 55    Last CBC:  Lab Results   Component Value Date    RBC 3.37 (L) 04/28/2018    HGB 8.3 (L) 04/28/2018    HCT 25.4 (L) 04/28/2018    MCV 75.4 (L) 04/28/2018    MCH 24.6 (L) 04/28/2018    MPV 6.8 04/28/2018    RDW 18.3 (H) 04/28/2018     04/28/2018     Patient History:  Recent hospitalizations/HC visits None reported   Recent medication changes None reported (currently off doxycycline)   Medications taken regularly that may interact with warfarin or alter INR None   Warfarin dose taken as prescribed Yes - does not use pillbox (only takes warfarin)   Signs/symptoms of bleeding No h/o bleeding reported   Vitamin K intake Normally avoids high vitamin K foods: ~0-1 serving per week   Recent vomiting/diarrhea/fever, changes in weight or activity level None reported   Tobacco or alcohol use Patient reports smoking 0 PPD  Patient reports having 0 drinks per day   Upcoming surgeries or procedures None reported     Assessment/Plan:  Patient's INR was therapeutic today (3.0). Patient denies any missed/extra warfarin doses, diet changes, medication changes (only takes warfarin), illness, bleeding, etc since last visit. Patient was instructed to continue warfarin 7.5 mg daily. Repeat INR in 6 weeks (patient prefers to extend interval). Patient was reminded to maintain consistent vitamin K intake and call with any bleeding, medication changes, or fever/vomiting/diarrhea. Patient understands dosing directions and information discussed. Dosing schedule and follow up appointment given to patient. Progress note routed to referring physician's office. Patient acknowledges working in consult agreement with pharmacist as referred by his/her physician. Next Clinic Appointment:  3/31    Please call [facility] at [phone] with any questions. Thanks! Loev Prakash.  Nette Milian, PharmD, [facility]  [facility]  Ph: [phone]  2/17/2021 11:26 AM    CLINICAL PHARMACY CONSULT: MED RECONCILIATION/REVIEW ADDENDUM    For Pharmacy Admin Tracking Only    PHSO: No  Total # of Interventions Recommended: 0  - Maintenance Safety Lab Monitoring #: 1  Total Interventions Accepted: 0  Time Spent (min): 15

## 2021-03-01 DIAGNOSIS — Z79.01 CHRONIC ANTICOAGULATION: ICD-10-CM

## 2021-03-01 DIAGNOSIS — I82.220 THROMBOSIS OF INFERIOR VENA CAVA (HCC): ICD-10-CM

## 2021-03-01 DIAGNOSIS — I82.423 THROMBOSIS OF BOTH ILIAC VEINS (HCC): ICD-10-CM

## 2021-03-01 DIAGNOSIS — I82.5Z1 CHRONIC VENOUS EMBOLISM AND THROMBOSIS OF DEEP VESSELS OF DISTAL END OF RIGHT LOWER EXTREMITY (HCC): ICD-10-CM

## 2021-03-01 RX ORDER — WARFARIN SODIUM 5 MG/1
TABLET ORAL
Qty: 48 TABLET | Refills: 0 | Status: SHIPPED | OUTPATIENT
Start: 2021-03-01 | End: 2021-03-29

## 2021-03-28 DIAGNOSIS — I82.220 THROMBOSIS OF INFERIOR VENA CAVA (HCC): ICD-10-CM

## 2021-03-28 DIAGNOSIS — I82.423 THROMBOSIS OF BOTH ILIAC VEINS (HCC): ICD-10-CM

## 2021-03-28 DIAGNOSIS — I82.5Z1 CHRONIC VENOUS EMBOLISM AND THROMBOSIS OF DEEP VESSELS OF DISTAL END OF RIGHT LOWER EXTREMITY (HCC): ICD-10-CM

## 2021-03-28 DIAGNOSIS — Z79.01 CHRONIC ANTICOAGULATION: ICD-10-CM

## 2021-03-29 RX ORDER — WARFARIN SODIUM 5 MG/1
TABLET ORAL
Qty: 48 TABLET | Refills: 0 | Status: SHIPPED | OUTPATIENT
Start: 2021-03-29 | End: 2021-04-27

## 2021-03-31 ENCOUNTER — ANTI-COAG VISIT (OUTPATIENT)
Dept: PHARMACY | Age: 56
End: 2021-03-31
Payer: COMMERCIAL

## 2021-03-31 DIAGNOSIS — I82.5Z1 CHRONIC VENOUS EMBOLISM AND THROMBOSIS OF DEEP VESSELS OF DISTAL END OF RIGHT LOWER EXTREMITY (HCC): ICD-10-CM

## 2021-03-31 LAB — INTERNATIONAL NORMALIZATION RATIO, POC: 2.5

## 2021-03-31 PROCEDURE — 99211 OFF/OP EST MAY X REQ PHY/QHP: CPT

## 2021-03-31 PROCEDURE — 85610 PROTHROMBIN TIME: CPT

## 2021-03-31 NOTE — PROGRESS NOTES
ANTICOAGULATION SERVICE    Aria Raya is a 54 y.o. male with PMHx significant for chronic DVT (last in Jan, 2019) who presents to clinic 3/31/2021 for anticoagulation monitoring and adjustment. Patient has been taking warfarin for 15+ years.      Anticoagulation Indication(s):  DVT    Referring Physician:  Dr. Trinity Shoemaker  Goal INR Range:  2-3  Duration of Anticoagulation Therapy:  Indefinite  Time of day dose taken:  AM  Product patient has at home:  warfarin 5 mg (peach)    INR Summary                            Warfarin regimen (mg)  Date INR   A/P    Sun Mon Tue Wed Thu Fri Sat Mg/wk  3/31 2.5 At goal, no change  7.5 7.5 7.5 7.5 7.5 7.5 7.5 52.5  2/17 3.0 At goal, no change  7.5 7.5 7.5 7.5 7.5 7.5 7.5 52.5  1/6 2.8 At goal, no change  7.5 7.5 7.5 7.5 7.5 7.5 7.5 52.5  12/2 2.9 At goal, no change  7.5 7.5 7.5 7.5 7.5 7.5 7.5 52.5  11/4 3.1 Above goal, continue  7.5 7.5 7.5 7.5 7.5 7.5 7.5 52.5  10/7 2.7 At goal, no change  7.5 7.5 7.5 7.5 7.5 7.5 7.5 52.5  9/9 2.7 At goal, no change  7.5 7.5 7.5 7.5 7.5 7.5 7.5 52.5  8/17 2.8 At goal, no change  7.5 7.5 7.5 7.5 7.5 7.5 7.5 52.5  8/3 3.4 Above goal, decrease  7.5 7.5 7.5 7.5 7.5 7.5 7.5 52.5  6/8 2.9 At goal, no change  7.5 7.5 7.5 7.5 7.5 10 7.5 55  2/28 2.7 At goal, no change  7.5 7.5 7.5 7.5 7.5 10 7.5 55  1/3 2.6 At goal, no change  7.5 7.5 7.5 7.5 7.5 10 7.5 55  11/20 2.8 At goal, no change  7.5 7.5 7.5 7.5 7.5 10 7.5 55  10/23 2.6 At goal, no change  7.5 7.5 7.5 7.5 7.5 10 7.5 55  8/23 2.2 At goal, no change  7.5 7.5 7.5 7.5 7.5 10 7.5 55  7/26 2.5 At goal, no change  7.5 7.5 7.5 7.5 7.5 10 7.5 55  6/28 2.3 At goal, no change  7.5 7.5 7.5 7.5 7.5 10 7.5 55  5/31 2.6 At goal, no change  7.5 7.5 7.5 7.5 7.5 10 7.5 55  5/1 2.5 At goal, no change  7.5 7.5 7.5 7.5 7.5 10 7.5 55  4/10 2.0 At goal, no change  7.5 7.5 7.5 7.5 7.5 10 7.5 55  3/13 2.6 At goal, no change  7.5 7.5 7.5 7.5 7.5 10 7.5 55  2/27 2.7 At goal, no change  7.5 7.5 7.5 7.5 7.5 10 7.5 55  2/20 2.9 At goal, no change  7.5 7.5 7.5 7.5 7.5 10 7.5 55  2/13 2.1 At goal, no change  7.5 7.5 7.5 7.5 7.5 10 7.5 55    Last CBC:  Lab Results   Component Value Date    RBC 3.37 (L) 04/28/2018    HGB 8.3 (L) 04/28/2018    HCT 25.4 (L) 04/28/2018    MCV 75.4 (L) 04/28/2018    MCH 24.6 (L) 04/28/2018    MPV 6.8 04/28/2018    RDW 18.3 (H) 04/28/2018     04/28/2018     Patient History:  Recent hospitalizations/HC visits None reported   Recent medication changes None reported (currently off doxycycline)   Medications taken regularly that may interact with warfarin or alter INR None   Warfarin dose taken as prescribed Yes - does not use pillbox (only takes warfarin)   Signs/symptoms of bleeding No h/o bleeding reported   Vitamin K intake Normally avoids high vitamin K foods: ~0-1 serving per week   Recent vomiting/diarrhea/fever, changes in weight or activity level None reported   Tobacco or alcohol use Patient reports smoking 0 PPD  Patient reports having 0 drinks per day   Upcoming surgeries or procedures None reported     Assessment/Plan:  Patient's INR was therapeutic today (2.5). Patient denies any missed/extra warfarin doses, diet changes, medication changes (only takes warfarin), illness, bleeding, etc since last visit. Patient was instructed to continue warfarin 7.5 mg daily. Repeat INR in 8 weeks (stable >6 months). Patient was reminded to maintain consistent vitamin K intake and call with any bleeding, medication changes, or fever/vomiting/diarrhea. Patient understands dosing directions and information discussed. Dosing schedule and follow up appointment given to patient. Progress note routed to referring physician's office. Patient acknowledges working in consult agreement with pharmacist as referred by his/her physician. Next Clinic Appointment:  5/26    Please call [facility] at [phone] with any questions. Thanks! Rudolph Aldridge.  Elva Hodgkins, PharmD, BCPS  [facility]  Ph: [phone]  3/31/2021 11:28 AM    CLINICAL PHARMACY CONSULT: MED RECONCILIATION/REVIEW ADDENDUM    For Pharmacy Admin Tracking Only    PHSO: No  Total # of Interventions Recommended: 0  - Maintenance Safety Lab Monitoring #: 1  Total Interventions Accepted: 0  Time Spent (min): 15

## 2021-04-14 ENCOUNTER — IMMUNIZATION (OUTPATIENT)
Dept: FAMILY MEDICINE CLINIC | Age: 56
End: 2021-04-14
Payer: COMMERCIAL

## 2021-04-14 PROCEDURE — 91300 COVID-19, PFIZER VACCINE 30MCG/0.3ML DOSE: CPT | Performed by: FAMILY MEDICINE

## 2021-04-14 PROCEDURE — 0001A COVID-19, PFIZER VACCINE 30MCG/0.3ML DOSE: CPT | Performed by: FAMILY MEDICINE

## 2021-04-26 DIAGNOSIS — I82.423 THROMBOSIS OF BOTH ILIAC VEINS (HCC): ICD-10-CM

## 2021-04-26 DIAGNOSIS — I82.5Z1 CHRONIC VENOUS EMBOLISM AND THROMBOSIS OF DEEP VESSELS OF DISTAL END OF RIGHT LOWER EXTREMITY (HCC): ICD-10-CM

## 2021-04-26 DIAGNOSIS — Z79.01 CHRONIC ANTICOAGULATION: ICD-10-CM

## 2021-04-26 DIAGNOSIS — I82.220 THROMBOSIS OF INFERIOR VENA CAVA (HCC): ICD-10-CM

## 2021-04-27 RX ORDER — WARFARIN SODIUM 5 MG/1
TABLET ORAL
Qty: 48 TABLET | Refills: 0 | Status: SHIPPED | OUTPATIENT
Start: 2021-04-27 | End: 2021-05-24

## 2021-05-03 ENCOUNTER — HOSPITAL ENCOUNTER (OUTPATIENT)
Dept: WOUND CARE | Age: 56
Discharge: HOME | End: 2021-05-03
Payer: COMMERCIAL

## 2021-05-03 VITALS
RESPIRATION RATE: 16 BRPM | HEART RATE: 89 BPM | TEMPERATURE: 97.1 F | WEIGHT: 236.77 LBS | SYSTOLIC BLOOD PRESSURE: 138 MMHG | BODY MASS INDEX: 37.08 KG/M2 | DIASTOLIC BLOOD PRESSURE: 80 MMHG

## 2021-05-03 DIAGNOSIS — Z91.199 NONCOMPLIANCE: ICD-10-CM

## 2021-05-03 PROCEDURE — 87205 SMEAR GRAM STAIN: CPT

## 2021-05-03 PROCEDURE — 87077 CULTURE AEROBIC IDENTIFY: CPT

## 2021-05-03 PROCEDURE — 6370000000 HC RX 637 (ALT 250 FOR IP): Performed by: SPECIALIST

## 2021-05-03 PROCEDURE — 11045 DBRDMT SUBQ TISS EACH ADDL: CPT

## 2021-05-03 PROCEDURE — 87070 CULTURE OTHR SPECIMN AEROBIC: CPT

## 2021-05-03 PROCEDURE — 11042 DBRDMT SUBQ TIS 1ST 20SQCM/<: CPT | Performed by: SPECIALIST

## 2021-05-03 PROCEDURE — 11045 DBRDMT SUBQ TISS EACH ADDL: CPT | Performed by: SPECIALIST

## 2021-05-03 PROCEDURE — 99202 OFFICE O/P NEW SF 15 MIN: CPT | Performed by: SPECIALIST

## 2021-05-03 PROCEDURE — 99214 OFFICE O/P EST MOD 30 MIN: CPT

## 2021-05-03 PROCEDURE — 11042 DBRDMT SUBQ TIS 1ST 20SQCM/<: CPT

## 2021-05-03 PROCEDURE — 87186 SC STD MICRODIL/AGAR DIL: CPT

## 2021-05-03 RX ORDER — CLOBETASOL PROPIONATE 0.5 MG/G
OINTMENT TOPICAL ONCE
Status: CANCELLED | OUTPATIENT
Start: 2021-05-03 | End: 2021-05-03

## 2021-05-03 RX ORDER — BETAMETHASONE DIPROPIONATE 0.05 %
OINTMENT (GRAM) TOPICAL ONCE
Status: CANCELLED | OUTPATIENT
Start: 2021-05-03 | End: 2021-05-03

## 2021-05-03 RX ORDER — DOXYCYCLINE HYCLATE 100 MG
100 TABLET ORAL 2 TIMES DAILY
Qty: 20 TABLET | Refills: 0 | Status: SHIPPED | OUTPATIENT
Start: 2021-05-03 | End: 2021-05-13

## 2021-05-03 RX ORDER — LIDOCAINE HYDROCHLORIDE 40 MG/ML
SOLUTION TOPICAL ONCE
Status: CANCELLED | OUTPATIENT
Start: 2021-05-03 | End: 2021-05-03

## 2021-05-03 RX ORDER — LIDOCAINE HYDROCHLORIDE 20 MG/ML
JELLY TOPICAL ONCE
Status: CANCELLED | OUTPATIENT
Start: 2021-05-03 | End: 2021-05-03

## 2021-05-03 RX ORDER — BACITRACIN, NEOMYCIN, POLYMYXIN B 400; 3.5; 5 [USP'U]/G; MG/G; [USP'U]/G
OINTMENT TOPICAL ONCE
Status: CANCELLED | OUTPATIENT
Start: 2021-05-03 | End: 2021-05-03

## 2021-05-03 RX ORDER — DOXYCYCLINE HYCLATE 100 MG/1
100 CAPSULE ORAL 2 TIMES DAILY
COMMUNITY
End: 2021-05-14 | Stop reason: ALTCHOICE

## 2021-05-03 RX ORDER — GENTAMICIN SULFATE 1 MG/G
OINTMENT TOPICAL ONCE
Status: CANCELLED | OUTPATIENT
Start: 2021-05-03 | End: 2021-05-03

## 2021-05-03 RX ORDER — GINSENG 100 MG
CAPSULE ORAL ONCE
Status: CANCELLED | OUTPATIENT
Start: 2021-05-03 | End: 2021-05-03

## 2021-05-03 RX ORDER — LIDOCAINE 50 MG/G
OINTMENT TOPICAL ONCE
Status: CANCELLED | OUTPATIENT
Start: 2021-05-03 | End: 2021-05-03

## 2021-05-03 RX ORDER — LIDOCAINE 40 MG/G
CREAM TOPICAL ONCE
Status: CANCELLED | OUTPATIENT
Start: 2021-05-03 | End: 2021-05-03

## 2021-05-03 RX ORDER — BACITRACIN ZINC AND POLYMYXIN B SULFATE 500; 1000 [USP'U]/G; [USP'U]/G
OINTMENT TOPICAL ONCE
Status: CANCELLED | OUTPATIENT
Start: 2021-05-03 | End: 2021-05-03

## 2021-05-03 RX ORDER — LIDOCAINE HYDROCHLORIDE 40 MG/ML
SOLUTION TOPICAL ONCE
Status: COMPLETED | OUTPATIENT
Start: 2021-05-03 | End: 2021-05-03

## 2021-05-03 RX ADMIN — LIDOCAINE HYDROCHLORIDE: 40 SOLUTION TOPICAL at 11:45

## 2021-05-03 ASSESSMENT — PAIN SCALES - GENERAL: PAINLEVEL_OUTOF10: 9

## 2021-05-03 ASSESSMENT — PAIN DESCRIPTION - FREQUENCY: FREQUENCY: CONTINUOUS

## 2021-05-03 ASSESSMENT — PAIN DESCRIPTION - DESCRIPTORS: DESCRIPTORS: BURNING

## 2021-05-03 NOTE — PROGRESS NOTES
Multilayer Compression Wrap   (Not Unna) Below the Knee    NAME:  Dorothy Winslow  YOB: 1965  MEDICAL RECORD NUMBER:  [id number]  DATE:  5/3/2021        Removed old Multilayer wrap if present and washed leg with mild soap/water.   Applied moisturizing agent to dry skin as needed.   Applied primary and secondary dressing as ordered     Applied multilayered dressing below the knee to Right lower leg(s)  (4 Layer Compression Wrap ) .   Instructed patient/caregiver not to remove dressing and to keep it clean and dry.   Instructed patient/caregiver on complications to report to provider, such as pain, numbness in toes, heavy drainage, and slippage of dressing.   Instructed patient on purpose of compression dressing and on activity and exercise recommendations.     Applied per   Guidelines    Electronically signed by Mali Cassidy RN on 5/3/2021 at 12:16 PM

## 2021-05-03 NOTE — PROGRESS NOTES
[facility] [place]      Alissa Delong  MEDICAL RECORD NUMBER:  [id number]  AGE: 64 y.o. GENDER: male  : 1965  EPISODE DATE:  5/3/2021      Subjective:     Chief Complaint   Patient presents with    Wound Check     initial         HISTORY of PRESENT ILLNESS HPI     Alissa Delong is a 64 y.o. male who presents today for wound evaluation. History of Wound Context: Patient has a long history of chronic venous insufficiency and lymphedema right lower extremity. Was last seen in the wound care in 2020 where his wounds were nearly healed. He was then subsequently lost to follow-up and returns today looking for antibiotics. However, states over the last month he has developed large foul-smelling draining wounds overlying his medial and lateral and ankle. States that he is been trying to apply a primary dressing and wrap his leg in compression.   Long ago stopped using his lymphedema pumps  Wound Pain Timing/Severity: none  Quality of pain: N/A  Severity:  0 / 10   Modifying Factors: None  Associated Signs/Symptoms: edema    Wound Identification:  Wound Type: venous  Contributing Factors: edema, venous stasis, lymphedema, obesity and non-adherence    Acute Wound: N/A not an acute wound        PAST MEDICAL HISTORY        Diagnosis Date    DVT (deep venous thrombosis) (HCC)     Hx of blood clots     Pain and swelling of right lower leg        PAST SURGICAL HISTORY    Past Surgical History:   Procedure Laterality Date    BALLOON ANGIOPLASTY, ARTERY Right 2017    at [address] SKIN SPLIT GRAFT Right        FAMILY HISTORY    Family History   Problem Relation Age of Onset    Diabetes Mother     Heart Attack Father        SOCIAL HISTORY    Social History     Tobacco Use    Smoking status: Never Smoker    Smokeless tobacco: Never Used   Substance Use Topics    Alcohol use: No    Drug use: No       ALLERGIES    No Known Allergies    MEDICATIONS    Current Outpatient Medications on File Prior to Encounter   Medication Sig Dispense Refill    doxycycline hyclate (VIBRAMYCIN) 100 MG capsule Take 100 mg by mouth 2 times daily      warfarin (COUMADIN) 5 MG tablet TAKE ONE AND ONE-HALF TABLET BY MOUTH DAILY EXCEPT TAKE 2 TABLETS ON FRIDAY. 48 tablet 0    Wound Cleansers (VASHE CLEANSING) SOLN Apply 5 mLs topically three times a week vashe wash soaked gauze 5-10 minutes  to right lower leg wounds with each dressing change 250 Bottle 0    Compression Stockings MISC by Does not apply route 40-50mmHG bilateral knee high, closed toe, custom fitted zipper stockings    ICD 10- I87.311, L97.919 1 each 3    Handicap Placard MISC by Does not apply route Dx Lower loeg DVT chronic, bilateral. Effective 2/12/2018 until 2/12/2021. 1 each 0     No current facility-administered medications on file prior to encounter.         REVIEW OF SYSTEMS    Constitutional: negative for chills and fevers  Respiratory: negative for cough and shortness of breath  Cardiovascular: negative except for lower extremity edema  Gastrointestinal: negative for abdominal pain and change in bowel habits  Musculoskeletal:negative for bone pain and myalgias      Last N3H if applicable: No results found for: LABA1C    Objective:      /80   Pulse 89   Temp 97.1 °F (36.2 °C) (Temporal)   Resp 16   Wt 236 lb 12.4 oz (107.4 kg)   BMI 37.08 kg/m²     Wt Readings from Last 3 Encounters:   05/03/21 236 lb 12.4 oz (107.4 kg)   01/13/20 247 lb 12.8 oz (112.4 kg)   10/25/19 238 lb 1.6 oz (108 kg)       PHYSICAL EXAM    General Appearance: alert and oriented to person, place and time, well-developed and well-nourished, in no acute distress  Head: normocephalic and atraumatic  Pulmonary/Chest: clear to auscultation bilaterally- no wheezes, rales or rhonchi, normal air movement, no respiratory distress  Cardiovascular: normal rate, normal S1 and S2, no gallops and no carotid bruits  Abdomen: soft, non-tender, non-distended, normal bowel sounds, no masses or organomegaly  Extremities: no cyanosis, no clubbing, 3 +nonpitting edema-  right lower extremity, near circumferential full-thickness fibrous wounds left knee and ankle containing fibrin and slough  Compression stocking present left lower extremity    Assessment:     No diagnosis found. Procedure Note  Indications:  Based on my examination of this patient's wound(s) today, sharp excision is required to promote healing and evaluate the extent healing. Performed by: Venkata Aviles MD    Consent obtained: Yes    Time out taken:  Yes    Pain Control: Anesthetic  Anesthetic: 4% Lidocaine Liquid Topical       Debridement:Excisional Debridement    Using curette the wound(s) was/were sharply debrided down through and including the removal of epidermis, dermis and subcutaneous tissue.         Devitalized Tissue Debrided:  fibrin, biofilm and slough    Pre Debridement Measurements:  Are located in the Wound Documentation Flow Sheet    Wound #: 1, 2 and 3     Post  Debridement Measurements:  Wound 05/03/21 Leg Right;Medial;Lower #1 (Active)   Wound Etiology Venous 05/03/21 1120   Wound Cleansed Cleansed with saline 05/03/21 1120   Dressing/Treatment Alginate with Ag 05/03/21 1155   Wound Length (cm) 6.7 cm 05/03/21 1120   Wound Width (cm) 5.5 cm 05/03/21 1120   Wound Depth (cm) 0.1 cm 05/03/21 1120   Wound Surface Area (cm^2) 36.85 cm^2 05/03/21 1120   Wound Volume (cm^3) 3.68 cm^3 05/03/21 1120   Post-Procedure Length (cm) 6.8 cm 05/03/21 1155   Post-Procedure Width (cm) 5.6 cm 05/03/21 1155   Post-Procedure Depth (cm) 0.3 cm 05/03/21 1155   Post-Procedure Surface Area (cm^2) 38.08 cm^2 05/03/21 1155   Post-Procedure Volume (cm^3) 11.42 cm^3 05/03/21 1155   Distance Tunneling (cm) 0 cm 05/03/21 1120   Undermining Maxium Distance (cm) 0 05/03/21 1120   Wound Assessment Pink/red;Fibrinous 05/03/21 1120   Drainage Amount Moderate 05/03/21 1120   Drainage Description Brown;Yellow 05/03/21 1120   Odor Mild 05/03/21 1120   Kiah-wound Assessment Blanchable erythema 05/03/21 1120   Margins Defined edges 05/03/21 1120   Wound Thickness Description not for Pressure Injury Full thickness 05/03/21 1120   Number of days: 0       Wound 05/03/21 Leg Anterior; Lower;Right #2 cluster (Active)   Wound Etiology Venous 05/03/21 1120   Wound Cleansed Cleansed with saline 05/03/21 1120   Dressing/Treatment Alginate with Ag 05/03/21 1155   Wound Length (cm) 11.2 cm 05/03/21 1120   Wound Width (cm) 15.5 cm 05/03/21 1120   Wound Depth (cm) 0.1 cm 05/03/21 1120   Wound Surface Area (cm^2) 173.6 cm^2 05/03/21 1120   Wound Volume (cm^3) 17.36 cm^3 05/03/21 1120   Post-Procedure Length (cm) 11.3 cm 05/03/21 1155   Post-Procedure Width (cm) 15.6 cm 05/03/21 1155   Post-Procedure Depth (cm) 0.3 cm 05/03/21 1155   Post-Procedure Surface Area (cm^2) 176.28 cm^2 05/03/21 1155   Post-Procedure Volume (cm^3) 52.88 cm^3 05/03/21 1155   Distance Tunneling (cm) 0 cm 05/03/21 1120   Undermining Maxium Distance (cm) 0 05/03/21 1120   Wound Assessment Fibrin;Pink/red;Slough; Devitalized tissue 05/03/21 1120   Drainage Amount Large 05/03/21 1120   Drainage Description Green;Brown 05/03/21 1120   Odor Mild 05/03/21 1120   Kiah-wound Assessment Maceration 05/03/21 1120   Margins Undefined edges; Unattached edges 05/03/21 1120   Wound Thickness Description not for Pressure Injury Full thickness 05/03/21 1120   Number of days: 0       Wound 05/03/21 Leg Lower;Right;Posterior #3 (Active)   Wound Etiology Venous 05/03/21 1120   Wound Cleansed Cleansed with saline 05/03/21 1120   Dressing/Treatment Alginate with Ag 05/03/21 1155   Wound Length (cm) 5 cm 05/03/21 1120   Wound Width (cm) 6.5 cm 05/03/21 1120   Wound Depth (cm) 0.1 cm 05/03/21 1120   Wound Surface Area (cm^2) 32.5 cm^2 05/03/21 1120   Wound Volume (cm^3) 3.25 cm^3 05/03/21 1120   Post-Procedure Length (cm) 5.1 cm 05/03/21 1155 Post-Procedure Width (cm) 6.6 cm 05/03/21 1155   Post-Procedure Depth (cm) 0.3 cm 05/03/21 1155   Post-Procedure Surface Area (cm^2) 33.66 cm^2 05/03/21 1155   Post-Procedure Volume (cm^3) 10.1 cm^3 05/03/21 1155   Distance Tunneling (cm) 0 cm 05/03/21 1120   Undermining Maxium Distance (cm) 0 05/03/21 1120   Wound Assessment Fibrin;Pink/red 05/03/21 1120   Drainage Amount Large 05/03/21 1120   Drainage Description Green;Brown 05/03/21 1120   Odor Mild 05/03/21 1120   Kiah-wound Assessment Maceration 05/03/21 1120   Margins Undefined edges 05/03/21 1120   Wound Thickness Description not for Pressure Injury Full thickness 05/03/21 1120   Number of days: 0          Percent of Wound Debrided: 100%    Total Surface Area Debrided:  247 sq cm     Bleeding:  Minimal    Hemostasis Achieved:  by pressure    Procedural Pain:  0  / 10     Post Procedural Pain:  0 / 10     Response to treatment:  Well tolerated by patient. Plan: Will once again wrap the patient in a 4-layer compression. A culture was taken but he was begun on doxycycline antibiotics. Understands the need to come back. Strongly advised to resume utilizing his lymphedema pumps    Treatment Note please see attached Discharge Instructions    New Medication(s) at this visit:   New Prescriptions    DOXYCYCLINE HYCLATE (VIBRA-TABS) 100 MG TABLET    Take 1 tablet by mouth 2 times daily for 10 days       Other orders at this visit:   Orders Placed This Encounter   Procedures    Culture, Wound       Weight Management: Yes but patient was not interested in Weight Management referral at this time. Smoking Cessation: Counseling given: Not Answered        Discharge Instructions          [address] [facility] Physician Orders and Discharge Instructions  [address] [address] [address] [address] [facility]  Telephone: [phone] hands with soap and water prior to and after every dressing change.     Wound

## 2021-05-05 ENCOUNTER — IMMUNIZATION (OUTPATIENT)
Dept: FAMILY MEDICINE CLINIC | Age: 56
End: 2021-05-05
Payer: COMMERCIAL

## 2021-05-05 PROCEDURE — 0002A COVID-19, PFIZER VACCINE 30MCG/0.3ML DOSE: CPT | Performed by: FAMILY MEDICINE

## 2021-05-05 PROCEDURE — 91300 COVID-19, PFIZER VACCINE 30MCG/0.3ML DOSE: CPT | Performed by: FAMILY MEDICINE

## 2021-05-06 LAB
GRAM STAIN RESULT: ABNORMAL
ORGANISM: ABNORMAL
WOUND/ABSCESS: ABNORMAL

## 2021-05-07 ENCOUNTER — HOSPITAL ENCOUNTER (OUTPATIENT)
Dept: WOUND CARE | Age: 56
Discharge: HOME | End: 2021-05-07
Payer: COMMERCIAL

## 2021-05-07 VITALS
DIASTOLIC BLOOD PRESSURE: 85 MMHG | RESPIRATION RATE: 18 BRPM | HEART RATE: 82 BPM | SYSTOLIC BLOOD PRESSURE: 139 MMHG | TEMPERATURE: 97.7 F

## 2021-05-07 DIAGNOSIS — I87.311 IDIOPATHIC CHRONIC VENOUS HYPERTENSION OF RIGHT LOWER EXTREMITY WITH ULCER (HCC): ICD-10-CM

## 2021-05-07 DIAGNOSIS — Z91.199 NONCOMPLIANCE: ICD-10-CM

## 2021-05-07 DIAGNOSIS — I89.0 CHRONIC ACQUIRED LYMPHEDEMA: ICD-10-CM

## 2021-05-07 DIAGNOSIS — L97.919 IDIOPATHIC CHRONIC VENOUS HYPERTENSION OF RIGHT LOWER EXTREMITY WITH ULCER (HCC): ICD-10-CM

## 2021-05-07 PROCEDURE — 29581 APPL MULTLAYER CMPRN SYS LEG: CPT

## 2021-05-07 ASSESSMENT — PAIN DESCRIPTION - FREQUENCY: FREQUENCY: CONTINUOUS

## 2021-05-07 ASSESSMENT — PAIN DESCRIPTION - LOCATION: LOCATION: LEG

## 2021-05-07 ASSESSMENT — PAIN SCALES - GENERAL: PAINLEVEL_OUTOF10: 8

## 2021-05-07 NOTE — PROGRESS NOTES
Multilayer Compression Wrap   (Not Unna) Below the Knee    NAME:  Alissa Delong  YOB: 1965  MEDICAL RECORD NUMBER:  [id number]  DATE:  5/7/2021        Removed old Multilayer wrap if present and washed leg with mild soap/water.   Applied moisturizing agent to dry skin as needed.   Applied primary and secondary dressing as ordered     Applied multilayered dressing below the knee to Right lower leg(s)  (4 Layer Compression Wrap ) .   Instructed patient/caregiver not to remove dressing and to keep it clean and dry.   Instructed patient/caregiver on complications to report to provider, such as pain, numbness in toes, heavy drainage, and slippage of dressing.   Instructed patient on purpose of compression dressing and on activity and exercise recommendations.     Applied per   Guidelines    Electronically signed by Jose Nixon RN on 5/7/2021 at 12:01 PM

## 2021-05-14 ENCOUNTER — HOSPITAL ENCOUNTER (OUTPATIENT)
Dept: WOUND CARE | Age: 56
Discharge: HOME | End: 2021-05-14
Payer: COMMERCIAL

## 2021-05-14 VITALS
DIASTOLIC BLOOD PRESSURE: 80 MMHG | SYSTOLIC BLOOD PRESSURE: 140 MMHG | HEART RATE: 81 BPM | RESPIRATION RATE: 18 BRPM | TEMPERATURE: 97 F

## 2021-05-14 DIAGNOSIS — I87.311 IDIOPATHIC CHRONIC VENOUS HYPERTENSION OF RIGHT LOWER EXTREMITY WITH ULCER (HCC): ICD-10-CM

## 2021-05-14 DIAGNOSIS — Z91.199 NONCOMPLIANCE: Primary | ICD-10-CM

## 2021-05-14 DIAGNOSIS — I89.0 CHRONIC ACQUIRED LYMPHEDEMA: ICD-10-CM

## 2021-05-14 DIAGNOSIS — L97.919 IDIOPATHIC CHRONIC VENOUS HYPERTENSION OF RIGHT LOWER EXTREMITY WITH ULCER (HCC): ICD-10-CM

## 2021-05-14 PROCEDURE — 11045 DBRDMT SUBQ TISS EACH ADDL: CPT

## 2021-05-14 PROCEDURE — 11042 DBRDMT SUBQ TIS 1ST 20SQCM/<: CPT

## 2021-05-14 PROCEDURE — 11045 DBRDMT SUBQ TISS EACH ADDL: CPT | Performed by: SPECIALIST

## 2021-05-14 PROCEDURE — 29581 APPL MULTLAYER CMPRN SYS LEG: CPT

## 2021-05-14 PROCEDURE — 11042 DBRDMT SUBQ TIS 1ST 20SQCM/<: CPT | Performed by: SPECIALIST

## 2021-05-14 PROCEDURE — 6370000000 HC RX 637 (ALT 250 FOR IP): Performed by: SPECIALIST

## 2021-05-14 RX ORDER — LIDOCAINE HYDROCHLORIDE 20 MG/ML
JELLY TOPICAL ONCE
Status: CANCELLED | OUTPATIENT
Start: 2021-05-14 | End: 2021-05-14

## 2021-05-14 RX ORDER — GINSENG 100 MG
CAPSULE ORAL ONCE
Status: CANCELLED | OUTPATIENT
Start: 2021-05-14 | End: 2021-05-14

## 2021-05-14 RX ORDER — BACITRACIN ZINC AND POLYMYXIN B SULFATE 500; 1000 [USP'U]/G; [USP'U]/G
OINTMENT TOPICAL ONCE
Status: CANCELLED | OUTPATIENT
Start: 2021-05-14 | End: 2021-05-14

## 2021-05-14 RX ORDER — LIDOCAINE HYDROCHLORIDE 40 MG/ML
SOLUTION TOPICAL ONCE
Status: CANCELLED | OUTPATIENT
Start: 2021-05-14 | End: 2021-05-14

## 2021-05-14 RX ORDER — CLOBETASOL PROPIONATE 0.5 MG/G
OINTMENT TOPICAL ONCE
Status: CANCELLED | OUTPATIENT
Start: 2021-05-14 | End: 2021-05-14

## 2021-05-14 RX ORDER — BACITRACIN, NEOMYCIN, POLYMYXIN B 400; 3.5; 5 [USP'U]/G; MG/G; [USP'U]/G
OINTMENT TOPICAL ONCE
Status: CANCELLED | OUTPATIENT
Start: 2021-05-14 | End: 2021-05-14

## 2021-05-14 RX ORDER — BETAMETHASONE DIPROPIONATE 0.05 %
OINTMENT (GRAM) TOPICAL ONCE
Status: CANCELLED | OUTPATIENT
Start: 2021-05-14 | End: 2021-05-14

## 2021-05-14 RX ORDER — DOXYCYCLINE HYCLATE 100 MG
100 TABLET ORAL 2 TIMES DAILY
Qty: 60 TABLET | Refills: 0 | Status: SHIPPED | OUTPATIENT
Start: 2021-05-14 | End: 2021-06-13

## 2021-05-14 RX ORDER — LIDOCAINE HYDROCHLORIDE 40 MG/ML
SOLUTION TOPICAL ONCE
Status: COMPLETED | OUTPATIENT
Start: 2021-05-14 | End: 2021-05-14

## 2021-05-14 RX ORDER — GENTAMICIN SULFATE 1 MG/G
OINTMENT TOPICAL ONCE
Status: CANCELLED | OUTPATIENT
Start: 2021-05-14 | End: 2021-05-14

## 2021-05-14 RX ORDER — LIDOCAINE 50 MG/G
OINTMENT TOPICAL ONCE
Status: CANCELLED | OUTPATIENT
Start: 2021-05-14 | End: 2021-05-14

## 2021-05-14 RX ORDER — LIDOCAINE 40 MG/G
CREAM TOPICAL ONCE
Status: CANCELLED | OUTPATIENT
Start: 2021-05-14 | End: 2021-05-14

## 2021-05-14 RX ADMIN — LIDOCAINE HYDROCHLORIDE: 40 SOLUTION TOPICAL at 10:30

## 2021-05-14 ASSESSMENT — PAIN SCALES - GENERAL: PAINLEVEL_OUTOF10: 7

## 2021-05-14 ASSESSMENT — PAIN DESCRIPTION - LOCATION: LOCATION: LEG

## 2021-05-14 ASSESSMENT — PAIN DESCRIPTION - PAIN TYPE: TYPE: ACUTE PAIN

## 2021-05-14 NOTE — PROGRESS NOTES
Multilayer Compression Wrap   (Not Unna) Below the Knee    NAME:  Cheng Ospina  YOB: 1965  MEDICAL RECORD NUMBER:  [id number]  DATE:  5/14/2021        Removed old Multilayer wrap if present and washed leg with mild soap/water.   Applied moisturizing agent to dry skin as needed.   Applied primary and secondary dressing as ordered     Applied multilayered dressing below the knee to Right lower leg(s)  (4 Layer Compression Wrap ) .   Instructed patient/caregiver not to remove dressing and to keep it clean and dry.   Instructed patient/caregiver on complications to report to provider, such as pain, numbness in toes, heavy drainage, and slippage of dressing.   Instructed patient on purpose of compression dressing and on activity and exercise recommendations.     Applied per   Guidelines    Electronically signed by Anastacio Lovell RN on 5/14/2021 at 11:27 AM

## 2021-05-14 NOTE — PROGRESS NOTES
[facility] [facility] [place]  Progress Note and Procedure Note      Alisa Mcqueen  MEDICAL RECORD NUMBER:  [id number]  AGE: 64 y.o. GENDER: male  : 1965  EPISODE DATE:  2021    Subjective:     Chief Complaint   Patient presents with    Wound Check     right leg         HISTORY of PRESENT ILLNESS HPI     Alisa Mcqueen is a 64 y.o. male who presents today for wound/ulcer evaluation. History of Wound Context: Patient has a long history of chronic venous insufficiency and lymphedema right lower extremity. Was last seen in the wound care in 2020 where his wounds were nearly healed. He was then subsequently lost to follow-up and returns today looking for antibiotics. However, states over the last month he has developed large foul-smelling draining wounds overlying his medial and lateral and ankle. States that he is been trying to apply a primary dressing and wrap his leg in compression. Long ago stopped using his lymphedema pumps  And states he has been compliant with his lymphedema pumps this past week. Continues to have marked drainage from the compression wraps requiring changes during the week.   Been taking his oral antibiotic past week  Pain Assessment:  Wound/Ulcer Pain Timing/Severity: none  Quality of pain: N/A  Severity:  0 / 10   Modifying Factors: None  Associated Signs/Symptoms: edema, drainage and odor    Ulcer Identification:  Ulcer Type: venous  Contributing Factors: edema, venous stasis, lymphedema, obesity and non-adherence    Objective:      BP (!) 140/80   Pulse 81   Temp 97 °F (36.1 °C) (Temporal)   Resp 18     Wt Readings from Last 3 Encounters:   21 236 lb 12.4 oz (107.4 kg)   20 247 lb 12.8 oz (112.4 kg)   10/25/19 238 lb 1.6 oz (108 kg)       PHYSICAL EXAM    Extremities: no cyanosis, no clubbing, 3 +nonpitting edema-  right lower extremity and with near circumferential full-thickness fibrous wounds right lower extremity containing fibrin slough, periwound fibrotic    Assessment:      1. Noncompliance    2. Chronic acquired lymphedema    3. Idiopathic chronic venous hypertension of right lower extremity with ulcer ([address]         Procedure Note  Indications:  Based on my examination of this patient's wound(s) today, sharp excision is required to promote healing and evaluate the extent healing. Performed by: Igor Price MD    Consent obtained? Yes    Time out taken: Yes    Pain Control:       Debridement:Excisional Debridement    Using curette the wound was sharply debrided    down through and including the removal of  epidermis, dermis and subcutaneous tissue.     Devitalized Tissue Debrided:  fibrin, biofilm and slough      Pre Debridement Measurements:  Are located in the Wound Documentation Flow Sheet   Wound #: 1, 2 and 3     Post  Debridement Measurements:  Wound 05/03/21 Leg Right;Medial;Lower #1 (Active)   Wound Image   05/14/21 1032   Wound Etiology Venous 05/03/21 1120   Wound Cleansed Cleansed with saline 05/07/21 1135   Dressing/Treatment Alginate with Ag 05/07/21 1135   Wound Length (cm) 6 cm 05/14/21 1032   Wound Width (cm) 5.8 cm 05/14/21 1032   Wound Depth (cm) 0.1 cm 05/14/21 1032   Wound Surface Area (cm^2) 34.8 cm^2 05/14/21 1032   Change in Wound Size % (l*w) 5.56 05/14/21 1032   Wound Volume (cm^3) 3.48 cm^3 05/14/21 1032   Wound Healing % 5 05/14/21 1032   Post-Procedure Length (cm) 6.1 cm 05/14/21 1102   Post-Procedure Width (cm) 5.9 cm 05/14/21 1102   Post-Procedure Depth (cm) 0.3 cm 05/14/21 1102   Post-Procedure Surface Area (cm^2) 35.99 cm^2 05/14/21 1102   Post-Procedure Volume (cm^3) 10.8 cm^3 05/14/21 1102   Distance Tunneling (cm) 0 cm 05/14/21 1032   Undermining Maxium Distance (cm) 0 05/14/21 1032   Wound Assessment Pink/red;Slough 05/14/21 1032   Drainage Amount Large 05/14/21 1032   Drainage Description Yellow;Brown 05/14/21 1032   Odor Moderate 05/14/21 1032   Kiah-wound Assessment Blanchable erythema 05/14/21 1032 Margins Defined edges 05/14/21 1032   Wound Thickness Description not for Pressure Injury Full thickness 05/14/21 1032   Number of days: 11       Wound 05/03/21 Leg Anterior; Lower;Right #2 cluster (Active)   Wound Image   05/14/21 1032   Wound Etiology Venous 05/03/21 1120   Wound Cleansed Cleansed with saline 05/14/21 1032   Dressing/Treatment Alginate with Ag 05/07/21 1135   Wound Length (cm) 10.8 cm 05/14/21 1032   Wound Width (cm) 15 cm 05/14/21 1032   Wound Depth (cm) 0.1 cm 05/14/21 1032   Wound Surface Area (cm^2) 162 cm^2 05/14/21 1032   Change in Wound Size % (l*w) 6.68 05/14/21 1032   Wound Volume (cm^3) 16.2 cm^3 05/14/21 1032   Wound Healing % 7 05/14/21 1032   Post-Procedure Length (cm) 10.9 cm 05/14/21 1102   Post-Procedure Width (cm) 15.1 cm 05/14/21 1102   Post-Procedure Depth (cm) 0.3 cm 05/14/21 1102   Post-Procedure Surface Area (cm^2) 164.59 cm^2 05/14/21 1102   Post-Procedure Volume (cm^3) 49.38 cm^3 05/14/21 1102   Distance Tunneling (cm) 0 cm 05/14/21 1032   Undermining Maxium Distance (cm) 0 05/14/21 1032   Wound Assessment Pink/red;Slough 05/14/21 1032   Drainage Amount Large 05/14/21 1032   Drainage Description Green;Brown 05/14/21 1032   Odor Moderate 05/14/21 1032   Kiah-wound Assessment Excoriated 05/14/21 1032   Margins Undefined edges 05/14/21 1032   Wound Thickness Description not for Pressure Injury Full thickness 05/14/21 1032   Number of days: 11       Wound 05/03/21 Leg Lower;Right;Posterior #3 (Active)   Wound Image   05/14/21 1032   Wound Etiology Venous 05/03/21 1120   Wound Cleansed Cleansed with saline 05/14/21 1032   Dressing/Treatment Alginate with Ag 05/07/21 1135   Wound Length (cm) 7.1 cm 05/14/21 1032   Wound Width (cm) 6.5 cm 05/14/21 1032   Wound Depth (cm) 0.1 cm 05/14/21 1032   Wound Surface Area (cm^2) 46.15 cm^2 05/14/21 1032   Change in Wound Size % (l*w) -42 05/14/21 1032   Wound Volume (cm^3) 4.62 cm^3 05/14/21 1032   Wound Healing % -42 05/14/21 1032 cleaned with 0.9% saline during your wound care visit)   · Do not scrub or use excessive force. · With each dressing change, rinse wounds with 0.9% Saline. (May use wound wash or soft contact solution. Both can be purchased at a local drug store). · If unable to obtain saline, may use a gentle soap and water. · Keep wounds dry in the shower unless otherwise instructed by the physician. · For wounds on lower legs, cast covers can be purchased at local drug stores, so that you may shower and keep the wound(s) dry. [] Instructions for Vashe Wash solution ONLY: Apply enough Vashe to soak a piece of gauze and place on wound bed for 5-10 minutes. DO NOT rinse after Vashe has been applied. Follow dressing application as instructed below. [x] Vashe Wash applied during clinic visit. Kiah wound Topical Treatments:  Do not apply lotions, creams, or ointments to the skin around the wound bed unless directed as followed:     [] Apply around the wound: [] moisturizing lotion [] Antifungal ointment [] No-Sting barrier film [] Zinc paste [] Other:       Dressings:           Wound Location: right lower leg      Apply Primary Dressing to wound: Other: gentamcyin ointment     Cover and Secure with:     Cover and Secure with: Other optilock or kerra max   Avoid contact of tape with skin if possible.  When to change Dressing: [] Daily [] Every Other Day [] Once a week  [] Three times per week: [] Monday, Wednesday, Friday [] Tuesday, Thursday, Saturday  [x] Do Not Change Dressing [] Other:    Edema Control:  Apply: [x] Compression Stocking  [x] Left Lower Leg [] Right Lower Leg     Apply every morning immediately when getting up. They should be applied to affected leg(s) from mid foot to knee making sure to cover the heel. Remove every night before going to bed. [x] Elevate leg(s) above the level of the heart for 30 minutes 4-5 times a day and/or when sitting.    [x] Avoid prolonged standing in one place.       Multilayer Compression Wrap:    Type: 4 Layer Compression Wrap   · Applied in Clinic to the :  Right lower leg(s) on 5/14/2021  · Do not get leg(s) with wrap wet. · If wraps become too tight call the center or completely remove the wrap. · Elevate leg(s) above the level of the heart when sitting. · Avoid prolonged standing in one place. Lymphedema Therapy:  [x] Wear Lymphedema pumps twice a day at settings prescribed by your physician. [x]Elevate leg(s) above the level of the heart for 30 minutes 4-5 times a day and/or when sitting. [x] Avoid prolonged standing in one place. Dietary:  Important dietary reminders:  1. Increase Protein intake (i.e. Lean meats, fish, eggs, legumes, and yogurt)  2. No added salt  3. If diabetic, follow a diabetic diet and check glucose prior to meals or as instructed by your physician. If you are still having pain after you go home:   For wounds on lower legs or arms, elevate the affected limb.  Use over-the-counter medications you would normally use for pain as permitted by your primary care doctor.  For persistent pain not relieved by the above interventions, please call your primary care doctor. Return Appointment:   Return Appointment: With Dr. Michele Paulson  in  [address] [place] [address])  o Scheduled weekly until (Date):      [x] Return Appointment for a Wound Assessment with a nurse on: 05/18/2021    []DME/Wound Dressing Supplies ordered at this visit: []Yes []No  o Supplies Provided by:   o Please call them directly to reorder supplies when you run out.  o CONTINUE TO USE THE SUPPLIES YOU HAVE AVAILABLE. IT IS MOST IMPORTANT TO KEEP THE WOUND COVERED AT ALL TIMES.     [x] Orders placed during your visit:   Orders Placed This Encounter   Procedures    Initiate Outpatient Wound Care Protocol       Your nurse  is:  Fabio Osborne     Electronically signed by Lilly Hunter RN on 5/14/2021 at 11:16 AM     [address] Information: Should you experience any significant changes in your wound(s) or have questions about your wound care, please contact the [address] [place] [address] at [phone]. We are open from 8:00am - 4:30p Monday thru Friday except for Wednesdays which we are closed. Please give us 24-48 hours to return your call. Call your doctor now or seek immediate medical care if:    · You have symptoms of infection, such as:  ? Increased pain, swelling, warmth, or redness. ? Red streaks leading from the area. ? Pus draining from the area. ? A fever.         Physician for this visit and orders: Yarely Ojeda MD    [] Patient unable to sign Discharge Instructions given to ECF/Transportation/POA        Electronically signed by Yarely Ojeda MD on 5/14/2021 at 11:35 AM

## 2021-05-17 ENCOUNTER — HOSPITAL ENCOUNTER (OUTPATIENT)
Dept: WOUND CARE | Age: 56
Discharge: HOME | End: 2021-05-17
Payer: COMMERCIAL

## 2021-05-17 VITALS
DIASTOLIC BLOOD PRESSURE: 88 MMHG | HEART RATE: 77 BPM | RESPIRATION RATE: 18 BRPM | TEMPERATURE: 97.6 F | SYSTOLIC BLOOD PRESSURE: 142 MMHG

## 2021-05-17 DIAGNOSIS — I87.311 IDIOPATHIC CHRONIC VENOUS HYPERTENSION OF RIGHT LOWER EXTREMITY WITH ULCER (HCC): ICD-10-CM

## 2021-05-17 DIAGNOSIS — Z91.199 NONCOMPLIANCE: ICD-10-CM

## 2021-05-17 DIAGNOSIS — L97.919 IDIOPATHIC CHRONIC VENOUS HYPERTENSION OF RIGHT LOWER EXTREMITY WITH ULCER (HCC): ICD-10-CM

## 2021-05-17 DIAGNOSIS — I89.0 CHRONIC ACQUIRED LYMPHEDEMA: ICD-10-CM

## 2021-05-17 PROCEDURE — 29581 APPL MULTLAYER CMPRN SYS LEG: CPT

## 2021-05-17 ASSESSMENT — PAIN DESCRIPTION - PROGRESSION: CLINICAL_PROGRESSION: NOT CHANGED

## 2021-05-17 ASSESSMENT — PAIN DESCRIPTION - LOCATION: LOCATION: LEG

## 2021-05-17 ASSESSMENT — PAIN DESCRIPTION - DESCRIPTORS: DESCRIPTORS: ACHING;BURNING

## 2021-05-17 NOTE — PROGRESS NOTES
Multilayer Compression Wrap   (Not Unna) Below the Knee    NAME:  Kim Sanchez  YOB: 1965  MEDICAL RECORD NUMBER:  [id number]  DATE:  5/17/2021        Removed old Multilayer wrap if present and washed leg with mild soap/water.   Applied moisturizing agent to dry skin as needed.   Applied primary and secondary dressing as ordered     Applied multilayered dressing below the knee to Right lower leg(s)  (4 Layer Compression Wrap ) .   Instructed patient/caregiver not to remove dressing and to keep it clean and dry.   Instructed patient/caregiver on complications to report to provider, such as pain, numbness in toes, heavy drainage, and slippage of dressing.   Instructed patient on purpose of compression dressing and on activity and exercise recommendations.     Applied per   Guidelines    Electronically signed by Domingo Fairbanks RN on 5/17/2021 at 11:20 AM

## 2021-05-21 ENCOUNTER — HOSPITAL ENCOUNTER (OUTPATIENT)
Dept: WOUND CARE | Age: 56
Discharge: HOME | End: 2021-05-21
Payer: COMMERCIAL

## 2021-05-21 VITALS
HEART RATE: 76 BPM | RESPIRATION RATE: 16 BRPM | TEMPERATURE: 87.6 F | DIASTOLIC BLOOD PRESSURE: 76 MMHG | SYSTOLIC BLOOD PRESSURE: 142 MMHG

## 2021-05-21 DIAGNOSIS — I89.0 CHRONIC ACQUIRED LYMPHEDEMA: ICD-10-CM

## 2021-05-21 DIAGNOSIS — I87.311 IDIOPATHIC CHRONIC VENOUS HYPERTENSION OF RIGHT LOWER EXTREMITY WITH ULCER (HCC): ICD-10-CM

## 2021-05-21 DIAGNOSIS — L97.919 IDIOPATHIC CHRONIC VENOUS HYPERTENSION OF RIGHT LOWER EXTREMITY WITH ULCER (HCC): ICD-10-CM

## 2021-05-21 DIAGNOSIS — Z91.199 NONCOMPLIANCE: Primary | ICD-10-CM

## 2021-05-21 PROCEDURE — 11042 DBRDMT SUBQ TIS 1ST 20SQCM/<: CPT | Performed by: SPECIALIST

## 2021-05-21 PROCEDURE — 29581 APPL MULTLAYER CMPRN SYS LEG: CPT

## 2021-05-21 PROCEDURE — 11045 DBRDMT SUBQ TISS EACH ADDL: CPT | Performed by: SPECIALIST

## 2021-05-21 PROCEDURE — 6370000000 HC RX 637 (ALT 250 FOR IP): Performed by: SPECIALIST

## 2021-05-21 PROCEDURE — 11042 DBRDMT SUBQ TIS 1ST 20SQCM/<: CPT

## 2021-05-21 PROCEDURE — 11045 DBRDMT SUBQ TISS EACH ADDL: CPT

## 2021-05-21 RX ORDER — GENTAMICIN SULFATE 1 MG/G
OINTMENT TOPICAL ONCE
Status: CANCELLED | OUTPATIENT
Start: 2021-05-21 | End: 2021-05-21

## 2021-05-21 RX ORDER — LIDOCAINE 50 MG/G
OINTMENT TOPICAL ONCE
Status: CANCELLED | OUTPATIENT
Start: 2021-05-21 | End: 2021-05-21

## 2021-05-21 RX ORDER — LIDOCAINE HYDROCHLORIDE 40 MG/ML
SOLUTION TOPICAL ONCE
Status: COMPLETED | OUTPATIENT
Start: 2021-05-21 | End: 2021-05-21

## 2021-05-21 RX ORDER — LIDOCAINE HYDROCHLORIDE 40 MG/ML
SOLUTION TOPICAL ONCE
Status: CANCELLED | OUTPATIENT
Start: 2021-05-21 | End: 2021-05-21

## 2021-05-21 RX ORDER — LIDOCAINE 40 MG/G
CREAM TOPICAL ONCE
Status: CANCELLED | OUTPATIENT
Start: 2021-05-21 | End: 2021-05-21

## 2021-05-21 RX ORDER — GINSENG 100 MG
CAPSULE ORAL ONCE
Status: CANCELLED | OUTPATIENT
Start: 2021-05-21 | End: 2021-05-21

## 2021-05-21 RX ORDER — BETAMETHASONE DIPROPIONATE 0.05 %
OINTMENT (GRAM) TOPICAL ONCE
Status: CANCELLED | OUTPATIENT
Start: 2021-05-21 | End: 2021-05-21

## 2021-05-21 RX ORDER — BACITRACIN, NEOMYCIN, POLYMYXIN B 400; 3.5; 5 [USP'U]/G; MG/G; [USP'U]/G
OINTMENT TOPICAL ONCE
Status: CANCELLED | OUTPATIENT
Start: 2021-05-21 | End: 2021-05-21

## 2021-05-21 RX ORDER — CLOBETASOL PROPIONATE 0.5 MG/G
OINTMENT TOPICAL ONCE
Status: CANCELLED | OUTPATIENT
Start: 2021-05-21 | End: 2021-05-21

## 2021-05-21 RX ORDER — LIDOCAINE HYDROCHLORIDE 20 MG/ML
JELLY TOPICAL ONCE
Status: CANCELLED | OUTPATIENT
Start: 2021-05-21 | End: 2021-05-21

## 2021-05-21 RX ORDER — BACITRACIN ZINC AND POLYMYXIN B SULFATE 500; 1000 [USP'U]/G; [USP'U]/G
OINTMENT TOPICAL ONCE
Status: CANCELLED | OUTPATIENT
Start: 2021-05-21 | End: 2021-05-21

## 2021-05-21 RX ADMIN — LIDOCAINE HYDROCHLORIDE: 40 SOLUTION TOPICAL at 10:48

## 2021-05-21 ASSESSMENT — PAIN SCALES - GENERAL: PAINLEVEL_OUTOF10: 6

## 2021-05-21 ASSESSMENT — PAIN DESCRIPTION - FREQUENCY: FREQUENCY: INTERMITTENT

## 2021-05-21 ASSESSMENT — PAIN DESCRIPTION - ORIENTATION: ORIENTATION: RIGHT

## 2021-05-21 NOTE — PROGRESS NOTES
[facility] [place] and [place] Note      Kuldeep Morris  MEDICAL RECORD NUMBER:  [id number]  AGE: 64 y.o. GENDER: male  : 1965  EPISODE DATE:  2021    Subjective:     Chief Complaint   Patient presents with    Wound Check     right lower leg         HISTORY of PRESENT ILLNESS HPI     Kuldeep Morris is a 64 y.o. male who presents today for wound/ulcer evaluation. History of Wound Context: Patient has a long history of chronic venous insufficiency and lymphedema right lower extremity.  Was last seen in the wound care in 2020 where his wounds were nearly healed.  He was then subsequently lost to follow-up and returns today looking for antibiotics. Luis Treadwell, states over the last month he has developed large foul-smelling draining wounds overlying his medial and lateral and ankle.  States that he is been trying to apply a primary dressing and wrap his leg in compression.  Long ago stopped using his lymphedema pumps  And states he has been compliant with his lymphedema pumps this past week as well as taking his antibiotic    Pain Assessment:  Wound/Ulcer Pain Timing/Severity: none  Quality of pain: N/A  Severity:  0 / 10   Modifying Factors: None  Associated Signs/Symptoms: edema, drainage and odor    Ulcer Identification:  Ulcer Type: venous  Contributing Factors: edema, venous stasis, lymphedema, obesity and non-adherence    Objective:      BP (!) 142/76   Pulse 76   Temp (!) 87.6 °F (30.9 °C) (Tympanic)   Resp 16     Wt Readings from Last 3 Encounters:   21 236 lb 12.4 oz (107.4 kg)   20 247 lb 12.8 oz (112.4 kg)   10/25/19 238 lb 1.6 oz (108 kg)       PHYSICAL EXAM    Extremities: no cyanosis, no clubbing and 2+ nonpitting edema of right lower extremity with full-thickness wounds near circumferential in nature containing fibrin and slough with islands of epithelialization within wound and margin of wounds    Assessment:      1. Noncompliance    2. Chronic acquired lymphedema    3. Idiopathic chronic venous hypertension of right lower extremity with ulcer ([address]         Procedure Note  Indications:  Based on my examination of this patient's wound(s) today, sharp excision is required to promote healing and evaluate the extent healing. Performed by: Jenise Tripp MD    Consent obtained? Yes    Time out taken: Yes    Pain Control: Anesthetic: 4% Lidocaine Liquid Topical     Debridement:Excisional Debridement    Using curette the wound was sharply debrided    down through and including the removal of  epidermis, dermis and subcutaneous tissue.     Devitalized Tissue Debrided:  fibrin, biofilm and slough      Pre Debridement Measurements:  Are located in the Wound Documentation Flow Sheet   Wound #: 1     Post  Debridement Measurements:  Wound 05/03/21 Leg Right;Medial;Lower #1 (Active)   Wound Image   05/14/21 1032   Wound Etiology Venous 05/03/21 1120   Wound Cleansed Cleansed with saline 05/21/21 1048   Dressing/Treatment Hydrofera blue 05/21/21 1129   Wound Length (cm) 6 cm 05/21/21 1048   Wound Width (cm) 5.8 cm 05/21/21 1048   Wound Depth (cm) 0.1 cm 05/21/21 1048   Wound Surface Area (cm^2) 34.8 cm^2 05/21/21 1048   Change in Wound Size % (l*w) 5.56 05/21/21 1048   Wound Volume (cm^3) 3.48 cm^3 05/21/21 1048   Wound Healing % 5 05/21/21 1048   Post-Procedure Length (cm) 6.1 cm 05/21/21 1118   Post-Procedure Width (cm) 5.9 cm 05/21/21 1118   Post-Procedure Depth (cm) 0.3 cm 05/21/21 1118   Post-Procedure Surface Area (cm^2) 35.99 cm^2 05/21/21 1118   Post-Procedure Volume (cm^3) 10.8 cm^3 05/21/21 1118   Distance Tunneling (cm) 0 cm 05/17/21 1126   Undermining Maxium Distance (cm) 0 05/17/21 1126   Wound Assessment Pink/red;Slough 05/21/21 1048   Drainage Amount Moderate 05/21/21 1048   Drainage Description Yellow;Brown 05/21/21 1048   Odor Mild 05/21/21 1048   Kiah-wound Assessment Maceration 05/21/21 1048   Margins Defined edges 05/21/21 1048   Wound Thickness Description not for Pressure Injury Full thickness 05/21/21 1048   Number of days: 18       Wound 05/03/21 Leg Anterior; Lower;Right #2 cluster (Active)   Wound Image   05/14/21 1032   Wound Etiology Venous 05/03/21 1120   Wound Cleansed Cleansed with saline 05/21/21 1048   Dressing/Treatment Hydrofera blue 05/21/21 1129   Wound Length (cm) 10 cm 05/21/21 1048   Wound Width (cm) 8.5 cm 05/21/21 1048   Wound Depth (cm) 0.1 cm 05/21/21 1048   Wound Surface Area (cm^2) 85 cm^2 05/21/21 1048   Change in Wound Size % (l*w) 51.04 05/21/21 1048   Wound Volume (cm^3) 8.5 cm^3 05/21/21 1048   Wound Healing % 51 05/21/21 1048   Post-Procedure Length (cm) 10.1 cm 05/21/21 1118   Post-Procedure Width (cm) 8.6 cm 05/21/21 1118   Post-Procedure Depth (cm) 0.3 cm 05/21/21 1118   Post-Procedure Surface Area (cm^2) 86.86 cm^2 05/21/21 1118   Post-Procedure Volume (cm^3) 26.06 cm^3 05/21/21 1118   Distance Tunneling (cm) 0 cm 05/17/21 1126   Undermining Maxium Distance (cm) 0 05/17/21 1126   Wound Assessment Pink/red;Slough 05/21/21 1048   Drainage Amount Moderate 05/21/21 1048   Drainage Description Yellow;Brown 05/21/21 1048   Odor Mild 05/21/21 1048   Kiah-wound Assessment Maceration 05/21/21 1048   Margins Unattached edges 05/21/21 1048   Wound Thickness Description not for Pressure Injury Full thickness 05/21/21 1048   Number of days: 18       Wound 05/03/21 Leg Lower;Right;Posterior #3 (Active)   Wound Image   05/14/21 1032   Wound Etiology Venous 05/03/21 1120   Wound Cleansed Cleansed with saline 05/21/21 1048   Dressing/Treatment Hydrofera blue 05/21/21 1129   Wound Length (cm) 6 cm 05/21/21 1048   Wound Width (cm) 6 cm 05/21/21 1048   Wound Depth (cm) 0.1 cm 05/21/21 1048   Wound Surface Area (cm^2) 36 cm^2 05/21/21 1048   Change in Wound Size % (l*w) -10.77 05/21/21 1048   Wound Volume (cm^3) 3.6 cm^3 05/21/21 1048   Wound Healing % -11 05/21/21 1048   Post-Procedure Length (cm) 6.1 cm 05/21/21 1118 Post-Procedure Width (cm) 6.1 cm 05/21/21 1118   Post-Procedure Depth (cm) 0.3 cm 05/21/21 1118   Post-Procedure Surface Area (cm^2) 37.21 cm^2 05/21/21 1118   Post-Procedure Volume (cm^3) 11.16 cm^3 05/21/21 1118   Distance Tunneling (cm) 0 cm 05/17/21 1126   Undermining Maxium Distance (cm) 0 05/17/21 1126   Wound Assessment Pink/red;Slough 05/21/21 1048   Drainage Amount Moderate 05/21/21 1048   Drainage Description Yellow;Brown 05/21/21 1048   Odor Mild 05/21/21 1048   Kiah-wound Assessment Maceration 05/21/21 1048   Margins Defined edges 05/21/21 1048   Wound Thickness Description not for Pressure Injury Full thickness 05/21/21 1048   Number of days: 18          Percent of Wound Debrided: 100%    Total Surface Area Debrided:  160 sq cm    Diabetic/Pressure/Non Pressure Ulcers only:  Ulcer: Non-Pressure ulcer, fat layer exposed    Bleeding: Minimal    Hemostasis Achieved: by pressure    Procedural Pain: 3  / 10     Post Procedural Pain: 0 / 10     Response to treatment:  Well tolerated by patient. Patient shows marked improvement in wound size as well as wound appearance. This reason we will switch to a Hydrofera Blue blue primary dressing. Unfortunately in all likelihood he will not be turning to wound clinic because of his concerns over the cost of treatment        Plan:     Treatment Note: Please see attached Discharge Instructions. These instructions were given and signed by the patient or POA    New Medication(s) at this visit:   New Prescriptions    No medications on file       Other orders at this visit:   Orders Placed This Encounter   Procedures   [address] Protocol       Discharge Instructions          [facility] Physician Orders and Discharge Instructions  [address] [address] [address] [address] [facility]. [address]  Telephone: [phone] hands with soap and water prior to and after every dressing change.     Wound place.       Multilayer Compression Wrap:    Type: 4 Layer Compression Wrap   · Applied in Clinic to the :  Right lower leg(s) on 5/21/2021  · Do not get leg(s) with wrap wet. · If wraps become too tight call the center or completely remove the wrap. · Elevate leg(s) above the level of the heart when sitting. · Avoid prolonged standing in one place. Lymphedema Therapy:  [x] Wear Lymphedema pumps twice a day at settings prescribed by your physician. [x]Elevate leg(s) above the level of the heart for 30 minutes 4-5 times a day and/or when sitting. [x] Avoid prolonged standing in one place. Dietary:  Important dietary reminders:  1. Increase Protein intake (i.e. Lean meats, fish, eggs, legumes, and yogurt)  2. No added salt  3. If diabetic, follow a diabetic diet and check glucose prior to meals or as instructed by your physician. If you are still having pain after you go home:   For wounds on lower legs or arms, elevate the affected limb.  Use over-the-counter medications you would normally use for pain as permitted by your primary care doctor.  For persistent pain not relieved by the above interventions, please call your primary care doctor. Return Appointment:   Return Appointment: With Dr. Dashawn Parks  in  [address] [place] [address])  o Scheduled weekly until (Date):      [] Return Appointment for a Wound Assessment with a nurse on:     []DME/Wound Dressing Supplies ordered at this visit: []Yes []No  o Supplies Provided by:   o Please call them directly to reorder supplies when you run out.  o CONTINUE TO USE THE SUPPLIES YOU HAVE AVAILABLE. IT IS MOST IMPORTANT TO KEEP THE WOUND COVERED AT ALL TIMES.     [x] Orders placed during your visit:   Orders Placed This Encounter   Procedures    Initiate Outpatient Wound Care Protocol       Your nurse  is:  Gabe Minaya     Electronically signed by Evangelina Pulido RN on 5/21/2021 at [facility] Information: Should you experience any significant changes in your wound(s) or have questions about your wound care, please contact the [address] [place] [address] at [phone]. We are open from 8:00am - 4:30p Monday thru Friday except for Wednesdays which we are closed. Please give us 24-48 hours to return your call. Call your doctor now or seek immediate medical care if:    · You have symptoms of infection, such as:  ? Increased pain, swelling, warmth, or redness. ? Red streaks leading from the area. ? Pus draining from the area. ? A fever.         Physician for this visit and orders: Mary Espinoza MD    [] Patient unable to sign Discharge Instructions given to ECF/Transportation/POA        Electronically signed by Mary Espinoza MD on 5/21/2021 at 11:49 AM

## 2021-05-21 NOTE — PROGRESS NOTES
Multilayer Compression Wrap   (Not Unna) Below the Knee    NAME:  Ralph Garsia  YOB: 1965  MEDICAL RECORD NUMBER:  [id number]  DATE:  5/21/2021        Removed old Multilayer wrap if present and washed leg with mild soap/water.   Applied moisturizing agent to dry skin as needed.   Applied primary and secondary dressing as ordered     Applied multilayered dressing below the knee to Right lower leg(s)  (4 Layer Compression Wrap ) .   Instructed patient/caregiver not to remove dressing and to keep it clean and dry.   Instructed patient/caregiver on complications to report to provider, such as pain, numbness in toes, heavy drainage, and slippage of dressing.   Instructed patient on purpose of compression dressing and on activity and exercise recommendations.     Applied per   Guidelines    Electronically signed by Jeff Garcia RN on 5/21/2021 at 11:30 AM

## 2021-05-21 NOTE — PLAN OF CARE
[address] [facility]:     [address] f: [phone] [phone]: [phone] [email or web address]      Ordering Center: The [address] [address] [address] [facility] [address]  [phone]  FAX: [phone]    Patient Information:      Yair Forrest  [address] [facility] [address] [place] [address] Road   : 1965  AGE: 64 y.o. GENDER: male   TODAYS DATE:  2021    Insurance:      PRIMARY INSURANCE:  Plan: [employer]ami[employer]  Coverage: ALEXANDRE  Effective Date: 10/1/2019  [id number] - (Commercial)    SECONDARY INSURANCE:  Plan:   Coverage:   Effective Date:   [unfilled]    [unfilled]   [unfilled]     Patient Wound Information:      1. Noncompliance    2. Chronic acquired lymphedema    3.  Idiopathic chronic venous hypertension of right lower extremity with ulcer ([facility] [address]        WOUNDS REQUIRING DRESSING SUPPLIES:     Wound 21 Leg Right;Medial;Lower #1 (Active)   Wound Image   21 1032   Wound Etiology Venous 21 1120   Wound Cleansed Cleansed with saline 21 1048   Dressing/Treatment Hydrofera blue 21 1129   Wound Length (cm) 6 cm 21 1048   Wound Width (cm) 5.8 cm 21 1048   Wound Depth (cm) 0.1 cm 21 1048   Wound Surface Area (cm^2) 34.8 cm^2 21 1048   Change in Wound Size % (l*w) 5.56 21 1048   Wound Volume (cm^3) 3.48 cm^3 21 1048   Wound Healing % 5 21 1048   Post-Procedure Length (cm) 6.1 cm 21 1118   Post-Procedure Width (cm) 5.9 cm 21 1118   Post-Procedure Depth (cm) 0.3 cm 21 1118   Post-Procedure Surface Area (cm^2) 35.99 cm^2 21 1118   Post-Procedure Volume (cm^3) 10.8 cm^3 21 1118   Distance Tunneling (cm) 0 cm 21 1126   Undermining Maxium Distance (cm) 0 21 1126   Wound Assessment Pink/red;Slough 21 1048   Drainage Amount Moderate 21 1048 Drainage Description Yellow;Brown 05/21/21 1048   Odor Mild 05/21/21 1048   Kiah-wound Assessment Maceration 05/21/21 1048   Margins Defined edges 05/21/21 1048   Wound Thickness Description not for Pressure Injury Full thickness 05/21/21 1048   Number of days: 18       Wound 05/03/21 Leg Anterior; Lower;Right #2 cluster (Active)   Wound Image   05/14/21 1032   Wound Etiology Venous 05/03/21 1120   Wound Cleansed Cleansed with saline 05/21/21 1048   Dressing/Treatment Hydrofera blue 05/21/21 1129   Wound Length (cm) 10 cm 05/21/21 1048   Wound Width (cm) 8.5 cm 05/21/21 1048   Wound Depth (cm) 0.1 cm 05/21/21 1048   Wound Surface Area (cm^2) 85 cm^2 05/21/21 1048   Change in Wound Size % (l*w) 51.04 05/21/21 1048   Wound Volume (cm^3) 8.5 cm^3 05/21/21 1048   Wound Healing % 51 05/21/21 1048   Post-Procedure Length (cm) 10.1 cm 05/21/21 1118   Post-Procedure Width (cm) 8.6 cm 05/21/21 1118   Post-Procedure Depth (cm) 0.3 cm 05/21/21 1118   Post-Procedure Surface Area (cm^2) 86.86 cm^2 05/21/21 1118   Post-Procedure Volume (cm^3) 26.06 cm^3 05/21/21 1118   Distance Tunneling (cm) 0 cm 05/17/21 1126   Undermining Maxium Distance (cm) 0 05/17/21 1126   Wound Assessment Pink/red;Slough 05/21/21 1048   Drainage Amount Moderate 05/21/21 1048   Drainage Description Yellow;Brown 05/21/21 1048   Odor Mild 05/21/21 1048   Kiah-wound Assessment Maceration 05/21/21 1048   Margins Unattached edges 05/21/21 1048   Wound Thickness Description not for Pressure Injury Full thickness 05/21/21 1048   Number of days: 18       Wound 05/03/21 Leg Lower;Right;Posterior #3 (Active)   Wound Image   05/14/21 1032   Wound Etiology Venous 05/03/21 1120   Wound Cleansed Cleansed with saline 05/21/21 1048   Dressing/Treatment Hydrofera blue 05/21/21 1129   Wound Length (cm) 6 cm 05/21/21 1048   Wound Width (cm) 6 cm 05/21/21 1048   Wound Depth (cm) 0.1 cm 05/21/21 1048   Wound Surface Area (cm^2) 36 cm^2 05/21/21 1048   Change in Wound Size % (l*w) -10.77 05/21/21 1048   Wound Volume (cm^3) 3.6 cm^3 05/21/21 1048   Wound Healing % -11 05/21/21 1048   Post-Procedure Length (cm) 6.1 cm 05/21/21 1118   Post-Procedure Width (cm) 6.1 cm 05/21/21 1118   Post-Procedure Depth (cm) 0.3 cm 05/21/21 1118   Post-Procedure Surface Area (cm^2) 37.21 cm^2 05/21/21 1118   Post-Procedure Volume (cm^3) 11.16 cm^3 05/21/21 1118   Distance Tunneling (cm) 0 cm 05/17/21 1126   Undermining Maxium Distance (cm) 0 05/17/21 1126   Wound Assessment Pink/red;Slough 05/21/21 1048   Drainage Amount Moderate 05/21/21 1048   Drainage Description Yellow;Brown 05/21/21 1048   Odor Mild 05/21/21 1048   Kiah-wound Assessment Maceration 05/21/21 1048   Margins Defined edges 05/21/21 1048   Wound Thickness Description not for Pressure Injury Full thickness 05/21/21 1048   Number of days: 18          Supplies Requested :      WOUND #: 1   PRIMARY DRESSING:  Hydrofera Blue pad   Cover and Secure with: 4X4 gauze pad  Other optilock, large ace bandage     FREQUENCY OF DRESSING CHANGES:  Once a week       WOUND #: 2   PRIMARY DRESSING:  Hydrofera Blue pad   Cover and Secure with: 4X4 gauze pad  Other optilock, large ace bandage     FREQUENCY OF DRESSING CHANGES:  Once a week       WOUND #: 3   PRIMARY DRESSING:  Hydrofera Blue pad   Cover and Secure with: 4X4 gauze pad  Other optilock, large ace bandage     FREQUENCY OF DRESSING CHANGES:  Once a week     ADDITIONAL ITEMS:  [] Gloves Small  [] Gloves Medium [] Gloves Large [] Gloves XLarge  [] Tape 1\" [x] Tape 2\" [] Tape 3\"  [] Medipore Tape  [x] Saline  [] Skin Prep   [] Adhesive Remover   [] Cotton Tip Applicators   [] Other:    Patient Wound(s) Debrided: [x] Yes   [] No    Debridement Date: 5/21/2021    Debribement Type: Excisional/Sharp    Patient currently being seen by Home Health: [] Yes   [x] No    Duration for needed supplies:  []15  [x]30  []60  []90 Days    Provider Information:      PROVIDER'S NAME/NPI:Khang Arita MD,  NPI: [id number]    I give permission to coordinate the care for this patient   assisting with verbal orders: Lilly Hunter RN 5/21/2021

## 2021-05-22 DIAGNOSIS — I82.5Z1 CHRONIC VENOUS EMBOLISM AND THROMBOSIS OF DEEP VESSELS OF DISTAL END OF RIGHT LOWER EXTREMITY (HCC): ICD-10-CM

## 2021-05-22 DIAGNOSIS — Z79.01 CHRONIC ANTICOAGULATION: ICD-10-CM

## 2021-05-22 DIAGNOSIS — I82.423 THROMBOSIS OF BOTH ILIAC VEINS (HCC): ICD-10-CM

## 2021-05-22 DIAGNOSIS — I82.220 THROMBOSIS OF INFERIOR VENA CAVA (HCC): ICD-10-CM

## 2021-05-24 ENCOUNTER — HOSPITAL ENCOUNTER (OUTPATIENT)
Dept: WOUND CARE | Age: 56
Discharge: HOME | End: 2021-05-24
Payer: COMMERCIAL

## 2021-05-24 VITALS — TEMPERATURE: 98 F

## 2021-05-24 DIAGNOSIS — Z91.199 NONCOMPLIANCE: ICD-10-CM

## 2021-05-24 DIAGNOSIS — I89.0 CHRONIC ACQUIRED LYMPHEDEMA: ICD-10-CM

## 2021-05-24 DIAGNOSIS — L97.919 IDIOPATHIC CHRONIC VENOUS HYPERTENSION OF RIGHT LOWER EXTREMITY WITH ULCER (HCC): ICD-10-CM

## 2021-05-24 DIAGNOSIS — I87.311 IDIOPATHIC CHRONIC VENOUS HYPERTENSION OF RIGHT LOWER EXTREMITY WITH ULCER (HCC): ICD-10-CM

## 2021-05-24 PROCEDURE — 29581 APPL MULTLAYER CMPRN SYS LEG: CPT

## 2021-05-24 RX ORDER — WARFARIN SODIUM 5 MG/1
TABLET ORAL
Qty: 48 TABLET | Refills: 0 | Status: SHIPPED | OUTPATIENT
Start: 2021-05-24 | End: 2021-06-22

## 2021-05-26 ENCOUNTER — ANTI-COAG VISIT (OUTPATIENT)
Dept: PHARMACY | Age: 56
End: 2021-05-26
Payer: COMMERCIAL

## 2021-05-26 LAB — INTERNATIONAL NORMALIZATION RATIO, POC: 3.9

## 2021-05-26 PROCEDURE — 99212 OFFICE O/P EST SF 10 MIN: CPT

## 2021-05-26 PROCEDURE — 85610 PROTHROMBIN TIME: CPT

## 2021-05-26 NOTE — PROGRESS NOTES
ANTICOAGULATION SERVICE    Socorro Weinberg is a 64 y.o. male with PMHx significant for chronic DVT (last in Jan, 2019) who presents to clinic 5/26/2021 for anticoagulation monitoring and adjustment. Patient has been taking warfarin for 15+ years.      Anticoagulation Indication(s):  DVT    Referring Physician:  Dr. Branch Sender  Goal INR Range:  2-3  Duration of Anticoagulation Therapy:  Indefinite  Time of day dose taken:  AM  Product patient has at home:  warfarin 5 mg (peach)    INR Summary                            Warfarin regimen (mg)  Date INR   A/P    Sun Mon Tue Wed Thu Fri Sat Mg/wk  5/26 3.9 Above goal (ABX), hold x 1 7.5 7.5 7.5 7.5 0/7.5 7.5 7.5 52.5  3/31 2.5 At goal, no change  7.5 7.5 7.5 7.5 7.5 7.5 7.5 52.5  2/17 3.0 At goal, no change  7.5 7.5 7.5 7.5 7.5 7.5 7.5 52.5  1/6 2.8 At goal, no change  7.5 7.5 7.5 7.5 7.5 7.5 7.5 52.5  12/2 2.9 At goal, no change  7.5 7.5 7.5 7.5 7.5 7.5 7.5 52.5  11/4 3.1 Above goal, continue  7.5 7.5 7.5 7.5 7.5 7.5 7.5 52.5  10/7 2.7 At goal, no change  7.5 7.5 7.5 7.5 7.5 7.5 7.5 52.5  9/9 2.7 At goal, no change  7.5 7.5 7.5 7.5 7.5 7.5 7.5 52.5  8/17 2.8 At goal, no change  7.5 7.5 7.5 7.5 7.5 7.5 7.5 52.5  8/3 3.4 Above goal, decrease  7.5 7.5 7.5 7.5 7.5 7.5 7.5 52.5  6/8 2.9 At goal, no change  7.5 7.5 7.5 7.5 7.5 10 7.5 55  2/28 2.7 At goal, no change  7.5 7.5 7.5 7.5 7.5 10 7.5 55  1/3 2.6 At goal, no change  7.5 7.5 7.5 7.5 7.5 10 7.5 55  11/20 2.8 At goal, no change  7.5 7.5 7.5 7.5 7.5 10 7.5 55  10/23 2.6 At goal, no change  7.5 7.5 7.5 7.5 7.5 10 7.5 55  8/23 2.2 At goal, no change  7.5 7.5 7.5 7.5 7.5 10 7.5 55  7/26 2.5 At goal, no change  7.5 7.5 7.5 7.5 7.5 10 7.5 55  6/28 2.3 At goal, no change  7.5 7.5 7.5 7.5 7.5 10 7.5 55  5/31 2.6 At goal, no change  7.5 7.5 7.5 7.5 7.5 10 7.5 55  5/1 2.5 At goal, no change  7.5 7.5 7.5 7.5 7.5 10 7.5 55  4/10 2.0 At goal, no change  7.5 7.5 7.5 7.5 7.5 10 7.5 55  3/13 2.6 At goal, no change  7.5 7.5 7.5 7.5 7.5 10 7.5 55  2/27 2.7 At goal, no change  7.5 7.5 7.5 7.5 7.5 10 7.5 55  2/20 2.9 At goal, no change  7.5 7.5 7.5 7.5 7.5 10 7.5 55  2/13 2.1 At goal, no change  7.5 7.5 7.5 7.5 7.5 10 7.5 55    Last CBC:  Lab Results   Component Value Date    RBC 3.37 (L) 04/28/2018    HGB 8.3 (L) 04/28/2018    HCT 25.4 (L) 04/28/2018    MCV 75.4 (L) 04/28/2018    MCH 24.6 (L) 04/28/2018    MPV 6.8 04/28/2018    RDW 18.3 (H) 04/28/2018     04/28/2018     Patient History:  Recent hospitalizations/HC visits None reported   Recent medication changes 5/3 resumed doxycycline BID per wound care   Medications taken regularly that may interact with warfarin or alter INR None reported   Warfarin dose taken as prescribed Yes - does not use pillbox (only takes warfarin)   Signs/symptoms of bleeding No h/o bleeding reported   Vitamin K intake Normally avoids high vitamin K foods: ~0-1 serving per week   Recent vomiting/diarrhea/fever, changes in weight or activity level None reported   Tobacco or alcohol use Patient reports smoking 0 PPD  Patient reports having 0 drinks per day   Upcoming surgeries or procedures None reported     Assessment/Plan:  Patient's INR was supratherapeutic today (3.9), likely due to resuming doxycycline on 5/3. Patient denies any missed/extra warfarin doses, diet changes, illness, bleeding, etc since last visit. Considered decreasing warfarin maintenance dose due to doxycycline, but patient states he will only be taking it for 2 more weeks. He has an appointment with wound care on 6/14 and decision will be made whether to continue doxycycline. Patient was instructed to hold warfarin tomorrow, then continue 7.5 mg daily for now. Repeat INR in 2 weeks after wound care visit. Patient was reminded to maintain consistent vitamin K intake and call with any bleeding, medication changes, or fever/vomiting/diarrhea. Patient understands dosing directions and information discussed. Dosing schedule and follow up appointment given to patient. Progress note routed to referring physician's office. Patient acknowledges working in consult agreement with pharmacist as referred by his/her physician. Next Clinic Appointment:      Please call [facility] at [phone] with any questions. Thanks! Miko Wong.  Jami Rubin, PharmD, [facility]  [facility]  Ph: [phone]  2021 11:35 AM    For Pharmacy Admin Tracking Only     Intervention Detail: Adherence Monitorin and Dose Adjustment: 1: reason: Therapy Optimization   Total # of Interventions Recommended: 1   Total # of Interventions Accepted: 1   Time Spent (min): 15

## 2021-06-14 ENCOUNTER — ANTI-COAG VISIT (OUTPATIENT)
Dept: PHARMACY | Age: 56
End: 2021-06-14
Payer: COMMERCIAL

## 2021-06-14 ENCOUNTER — HOSPITAL ENCOUNTER (OUTPATIENT)
Dept: WOUND CARE | Age: 56
Discharge: HOME | End: 2021-06-14
Payer: COMMERCIAL

## 2021-06-14 ENCOUNTER — TELEPHONE (OUTPATIENT)
Dept: PHARMACY | Age: 56
End: 2021-06-14

## 2021-06-14 VITALS
TEMPERATURE: 98.7 F | SYSTOLIC BLOOD PRESSURE: 126 MMHG | HEART RATE: 78 BPM | RESPIRATION RATE: 16 BRPM | DIASTOLIC BLOOD PRESSURE: 80 MMHG

## 2021-06-14 DIAGNOSIS — I89.0 CHRONIC ACQUIRED LYMPHEDEMA: ICD-10-CM

## 2021-06-14 DIAGNOSIS — L97.919 IDIOPATHIC CHRONIC VENOUS HYPERTENSION OF RIGHT LOWER EXTREMITY WITH ULCER (HCC): ICD-10-CM

## 2021-06-14 DIAGNOSIS — I82.5Z9 CHRONIC VENOUS EMBOLISM AND THROMBOSIS OF DEEP VESSELS OF DISTAL LOWER EXTREMITY, UNSPECIFIED LATERALITY (HCC): Primary | ICD-10-CM

## 2021-06-14 DIAGNOSIS — I87.311 IDIOPATHIC CHRONIC VENOUS HYPERTENSION OF RIGHT LOWER EXTREMITY WITH ULCER (HCC): ICD-10-CM

## 2021-06-14 DIAGNOSIS — Z91.199 NONCOMPLIANCE: Primary | ICD-10-CM

## 2021-06-14 LAB — INTERNATIONAL NORMALIZATION RATIO, POC: 2.9

## 2021-06-14 PROCEDURE — 29581 APPL MULTLAYER CMPRN SYS LEG: CPT

## 2021-06-14 PROCEDURE — 11042 DBRDMT SUBQ TIS 1ST 20SQCM/<: CPT

## 2021-06-14 PROCEDURE — 99211 OFF/OP EST MAY X REQ PHY/QHP: CPT

## 2021-06-14 PROCEDURE — 11042 DBRDMT SUBQ TIS 1ST 20SQCM/<: CPT | Performed by: SPECIALIST

## 2021-06-14 PROCEDURE — 11045 DBRDMT SUBQ TISS EACH ADDL: CPT | Performed by: SPECIALIST

## 2021-06-14 PROCEDURE — 6370000000 HC RX 637 (ALT 250 FOR IP): Performed by: SPECIALIST

## 2021-06-14 PROCEDURE — 85610 PROTHROMBIN TIME: CPT

## 2021-06-14 PROCEDURE — 11045 DBRDMT SUBQ TISS EACH ADDL: CPT

## 2021-06-14 RX ORDER — LIDOCAINE HYDROCHLORIDE 20 MG/ML
JELLY TOPICAL ONCE
Status: CANCELLED | OUTPATIENT
Start: 2021-06-14 | End: 2021-06-14

## 2021-06-14 RX ORDER — LIDOCAINE 40 MG/G
CREAM TOPICAL ONCE
Status: CANCELLED | OUTPATIENT
Start: 2021-06-14 | End: 2021-06-14

## 2021-06-14 RX ORDER — CLOBETASOL PROPIONATE 0.5 MG/G
OINTMENT TOPICAL ONCE
Status: CANCELLED | OUTPATIENT
Start: 2021-06-14 | End: 2021-06-14

## 2021-06-14 RX ORDER — GENTAMICIN SULFATE 1 MG/G
OINTMENT TOPICAL ONCE
Status: CANCELLED | OUTPATIENT
Start: 2021-06-14 | End: 2021-06-14

## 2021-06-14 RX ORDER — LIDOCAINE 50 MG/G
OINTMENT TOPICAL ONCE
Status: CANCELLED | OUTPATIENT
Start: 2021-06-14 | End: 2021-06-14

## 2021-06-14 RX ORDER — LIDOCAINE HYDROCHLORIDE 40 MG/ML
SOLUTION TOPICAL ONCE
Status: COMPLETED | OUTPATIENT
Start: 2021-06-14 | End: 2021-06-14

## 2021-06-14 RX ORDER — GINSENG 100 MG
CAPSULE ORAL ONCE
Status: CANCELLED | OUTPATIENT
Start: 2021-06-14 | End: 2021-06-14

## 2021-06-14 RX ORDER — LIDOCAINE HYDROCHLORIDE 40 MG/ML
SOLUTION TOPICAL ONCE
Status: CANCELLED | OUTPATIENT
Start: 2021-06-14 | End: 2021-06-14

## 2021-06-14 RX ORDER — BACITRACIN, NEOMYCIN, POLYMYXIN B 400; 3.5; 5 [USP'U]/G; MG/G; [USP'U]/G
OINTMENT TOPICAL ONCE
Status: CANCELLED | OUTPATIENT
Start: 2021-06-14 | End: 2021-06-14

## 2021-06-14 RX ORDER — BACITRACIN ZINC AND POLYMYXIN B SULFATE 500; 1000 [USP'U]/G; [USP'U]/G
OINTMENT TOPICAL ONCE
Status: CANCELLED | OUTPATIENT
Start: 2021-06-14 | End: 2021-06-14

## 2021-06-14 RX ORDER — BETAMETHASONE DIPROPIONATE 0.05 %
OINTMENT (GRAM) TOPICAL ONCE
Status: CANCELLED | OUTPATIENT
Start: 2021-06-14 | End: 2021-06-14

## 2021-06-14 RX ADMIN — LIDOCAINE HYDROCHLORIDE: 40 SOLUTION TOPICAL at 10:51

## 2021-06-14 ASSESSMENT — PAIN DESCRIPTION - LOCATION: LOCATION: LEG

## 2021-06-14 ASSESSMENT — PAIN DESCRIPTION - PROGRESSION: CLINICAL_PROGRESSION: NOT CHANGED

## 2021-06-14 ASSESSMENT — PAIN DESCRIPTION - ORIENTATION: ORIENTATION: RIGHT

## 2021-06-14 ASSESSMENT — PAIN DESCRIPTION - DESCRIPTORS: DESCRIPTORS: BURNING

## 2021-06-14 ASSESSMENT — PAIN DESCRIPTION - PAIN TYPE: TYPE: CHRONIC PAIN

## 2021-06-14 ASSESSMENT — PAIN DESCRIPTION - ONSET: ONSET: ON-GOING

## 2021-06-14 ASSESSMENT — PAIN DESCRIPTION - FREQUENCY: FREQUENCY: CONTINUOUS

## 2021-06-14 NOTE — PROGRESS NOTES
Multilayer Compression Wrap   (Not Unna) Below the Knee    NAME:  Tanika Velez  YOB: 1965  MEDICAL RECORD NUMBER:  [id number]  DATE:  6/14/2021        Removed old Multilayer wrap if present and washed leg with mild soap/water.   Applied moisturizing agent to dry skin as needed.   Applied primary and secondary dressing as ordered     Applied multilayered dressing below the knee to Right lower leg(s)  (4 Layer Compression Wrap ) .   Instructed patient/caregiver not to remove dressing and to keep it clean and dry.   Instructed patient/caregiver on complications to report to provider, such as pain, numbness in toes, heavy drainage, and slippage of dressing.   Instructed patient on purpose of compression dressing and on activity and exercise recommendations.     Applied per   Guidelines    Electronically signed by Adalberto Jewell RN on 6/14/2021 at 12:02 PM

## 2021-06-14 NOTE — PROGRESS NOTES
ANTICOAGULATION SERVICE    Cheng Ospina is a 64 y.o. male with PMHx significant for chronic DVT (last in Jan, 2019) who presents to clinic 6/14/2021 for anticoagulation monitoring and adjustment. Patient has been taking warfarin for 15+ years.      Anticoagulation Indication(s):  DVT    Referring Physician:  Dr. George Coelho  Goal INR Range:  2-3  Duration of Anticoagulation Therapy:  Indefinite  Time of day dose taken:  AM  Product patient has at home:  warfarin 5 mg (peach)    INR Summary                            Warfarin regimen (mg)  Date INR   A/P    Sun Mon Tue Wed Thu Fri Sat Mg/wk  6/14 2.9 At goal, continue  7.5 7.5 7.5 7.5 0/7.5 7.5 7.5 52.5  5/26 3.9 Above goal (ABX), hold x 1 7.5 7.5 7.5 7.5 0/7.5 7.5 7.5 52.5  3/31 2.5 At goal, no change  7.5 7.5 7.5 7.5 7.5 7.5 7.5 52.5  2/17 3.0 At goal, no change  7.5 7.5 7.5 7.5 7.5 7.5 7.5 52.5  1/6 2.8 At goal, no change  7.5 7.5 7.5 7.5 7.5 7.5 7.5 52.5  12/2 2.9 At goal, no change  7.5 7.5 7.5 7.5 7.5 7.5 7.5 52.5  11/4 3.1 Above goal, continue  7.5 7.5 7.5 7.5 7.5 7.5 7.5 52.5  10/7 2.7 At goal, no change  7.5 7.5 7.5 7.5 7.5 7.5 7.5 52.5  9/9 2.7 At goal, no change  7.5 7.5 7.5 7.5 7.5 7.5 7.5 52.5  8/17 2.8 At goal, no change  7.5 7.5 7.5 7.5 7.5 7.5 7.5 52.5  8/3 3.4 Above goal, decrease  7.5 7.5 7.5 7.5 7.5 7.5 7.5 52.5  6/8 2.9 At goal, no change  7.5 7.5 7.5 7.5 7.5 10 7.5 55  2/28 2.7 At goal, no change  7.5 7.5 7.5 7.5 7.5 10 7.5 55  1/3 2.6 At goal, no change  7.5 7.5 7.5 7.5 7.5 10 7.5 55  11/20 2.8 At goal, no change  7.5 7.5 7.5 7.5 7.5 10 7.5 55  10/23 2.6 At goal, no change  7.5 7.5 7.5 7.5 7.5 10 7.5 55  8/23 2.2 At goal, no change  7.5 7.5 7.5 7.5 7.5 10 7.5 55  7/26 2.5 At goal, no change  7.5 7.5 7.5 7.5 7.5 10 7.5 55  6/28 2.3 At goal, no change  7.5 7.5 7.5 7.5 7.5 10 7.5 55  5/31 2.6 At goal, no change  7.5 7.5 7.5 7.5 7.5 10 7.5 55  5/1 2.5 At goal, no change  7.5 7.5 7.5 7.5 7.5 10 7.5 55  4/10 2.0 At goal, no change  7.5 7.5 7.5 7.5 7.5 10 7.5 55  3/13 2.6 At goal, no change  7.5 7.5 7.5 7.5 7.5 10 7.5 55  2/27 2.7 At goal, no change  7.5 7.5 7.5 7.5 7.5 10 7.5 55  2/20 2.9 At goal, no change  7.5 7.5 7.5 7.5 7.5 10 7.5 55  2/13 2.1 At goal, no change  7.5 7.5 7.5 7.5 7.5 10 7.5 55    Last CBC:  Lab Results   Component Value Date    RBC 3.37 (L) 04/28/2018    HGB 8.3 (L) 04/28/2018    HCT 25.4 (L) 04/28/2018    MCV 75.4 (L) 04/28/2018    MCH 24.6 (L) 04/28/2018    MPV 6.8 04/28/2018    RDW 18.3 (H) 04/28/2018     04/28/2018     Patient History:  Recent hospitalizations/HC visits None reported   Recent medication changes Doxycycline stopped   Medications taken regularly that may interact with warfarin or alter INR None reported   Warfarin dose taken as prescribed Yes - does not use pillbox (only takes warfarin)   Signs/symptoms of bleeding No h/o bleeding reported   Vitamin K intake Normally avoids high vitamin K foods: ~0-1 serving per week   Recent vomiting/diarrhea/fever, changes in weight or activity level None reported   Tobacco or alcohol use Patient reports smoking 0 PPD  Patient reports having 0 drinks per day   Upcoming surgeries or procedures None reported     Assessment/Plan:  Patient's INR was therapeutic today (2.9). Patient denies any missed/extra warfarin doses, diet changes, illness, bleeding, etc since last visit. Patient was not continued on doxycycline. Patient was instructed to continue 7.5 mg daily for now. Repeat INR in 4 weeks. Patient was reminded to maintain consistent vitamin K intake and call with any bleeding, medication changes, or fever/vomiting/diarrhea. Patient understands dosing directions and information discussed. Dosing schedule and follow up appointment given to patient. Progress note routed to referring physician's office. Patient acknowledges working in consult agreement with pharmacist as referred by his/her physician.      Next Clinic Appointment:  7/12    Please

## 2021-06-14 NOTE — PROGRESS NOTES
[facility]  Progress Note and Procedure Note      Holland Stoddard  MEDICAL RECORD NUMBER:  [id number]  AGE: 64 y.o. GENDER: male  : 1965  EPISODE DATE:  2021    Subjective:     Chief Complaint   Patient presents with    Wound Check     right lower leg         HISTORY of PRESENT ILLNESS HPI     Holland Stoddard is a 64 y.o. male who presents today for wound/ulcer evaluation. History of Wound Context: Patient has a long history of chronic venous insufficiency and lymphedema right lower extremity.  Was last seen in the wound care in 2020 where his wounds were nearly healed.  He was then subsequently lost to follow-up and returns today looking for antibiotics. Lucinda Aburto, states over the last month he has developed large foul-smelling draining wounds overlying his medial and lateral and ankle.  States that he is been trying to apply a primary dressing and wrap his leg in compression.  Long ago stopped using his lymphedema pumps  And states he has been compliant with his lymphedema pumps this past week as well as taking his antibiotic  Patient returns today because he simply cannot manage these wounds on his own.   He has been reluctant to return to the clinic because of financial constraints  Pain Assessment:  Wound/Ulcer Pain Timing/Severity: none  Quality of pain: N/A  Severity:  0 / 10   Modifying Factors: None  Associated Signs/Symptoms: edema, drainage and odor    Ulcer Identification:  Ulcer Type: venous  Contributing Factors: edema, venous stasis, lymphedema, obesity and non-adherence    Objective:      /80   Pulse 78   Temp 98.7 °F (37.1 °C) (Temporal)   Resp 16     Wt Readings from Last 3 Encounters:   21 236 lb 12.4 oz (107.4 kg)   20 247 lb 12.8 oz (112.4 kg)   10/25/19 238 lb 1.6 oz (108 kg)       PHYSICAL EXAM    Extremities: no cyanosis, no clubbing, 3 + edema-  right lower extremity and multiple full-thickness wounds near circumferential in nature containing fibrin, slough foul-smelling drainage. Assessment:      1. Noncompliance    2. Chronic acquired lymphedema    3. Idiopathic chronic venous hypertension of right lower extremity with ulcer ([address]         Procedure Note  Indications:  Based on my examination of this patient's wound(s) today, sharp excision is required to promote healing and evaluate the extent healing. Performed by: Litzy Dinh MD    Consent obtained? Yes    Time out taken: Yes    Pain Control: Anesthetic: 4% Lidocaine Liquid Topical     Debridement:Excisional Debridement    Using curette the wound was sharply debrided    down through and including the removal of  epidermis, dermis and subcutaneous tissue.     Devitalized Tissue Debrided:  fibrin, biofilm, slough and exudate      Pre Debridement Measurements:  Are located in the Wound Documentation Flow Sheet   Wound #: 1, 2 and 3     Post  Debridement Measurements:  Wound 05/03/21 Leg Right;Medial;Lower #1 (Active)   Wound Image   05/14/21 1032   Wound Etiology Venous 05/03/21 1120   Wound Cleansed Cleansed with saline 06/14/21 1043   Dressing/Treatment Alginate with Ag 06/14/21 1201   Wound Length (cm) 5.7 cm 06/14/21 1043   Wound Width (cm) 6.6 cm 06/14/21 1043   Wound Depth (cm) 0.1 cm 06/14/21 1043   Wound Surface Area (cm^2) 37.62 cm^2 06/14/21 1043   Change in Wound Size % (l*w) -2.09 06/14/21 1043   Wound Volume (cm^3) 3.76 cm^3 06/14/21 1043   Wound Healing % -2 06/14/21 1043   Post-Procedure Length (cm) 5.8 cm 06/14/21 1112   Post-Procedure Width (cm) 6.7 cm 06/14/21 1112   Post-Procedure Depth (cm) 0.3 cm 06/14/21 1112   Post-Procedure Surface Area (cm^2) 38.86 cm^2 06/14/21 1112   Post-Procedure Volume (cm^3) 11.66 cm^3 06/14/21 1112   Distance Tunneling (cm) 0 cm 06/14/21 1043   Undermining Maxium Distance (cm) 0 06/14/21 1043   Wound Assessment Pink/red;Slough 06/14/21 1043   Drainage Amount Large 06/14/21 1043   Drainage Description Yellow;Brown 06/14/21 1043 Odor Mild 06/14/21 1043   Kiah-wound Assessment Maceration 06/14/21 1043   Margins Defined edges 06/14/21 1043   Wound Thickness Description not for Pressure Injury Full thickness 06/14/21 1043   Number of days: 42       Wound 05/03/21 Leg Anterior; Lower;Right #2 cluster (Active)   Wound Image   05/14/21 1032   Wound Etiology Venous 05/03/21 1120   Wound Cleansed Cleansed with saline 06/14/21 1043   Dressing/Treatment Alginate with Ag 06/14/21 1201   Wound Length (cm) 9.8 cm 06/14/21 1043   Wound Width (cm) 9.3 cm 06/14/21 1043   Wound Depth (cm) 0.1 cm 06/14/21 1043   Wound Surface Area (cm^2) 91.14 cm^2 06/14/21 1043   Change in Wound Size % (l*w) 47.5 06/14/21 1043   Wound Volume (cm^3) 9.11 cm^3 06/14/21 1043   Wound Healing % 48 06/14/21 1043   Post-Procedure Length (cm) 9.9 cm 06/14/21 1112   Post-Procedure Width (cm) 9.4 cm 06/14/21 1112   Post-Procedure Depth (cm) 0.3 cm 06/14/21 1112   Post-Procedure Surface Area (cm^2) 93.06 cm^2 06/14/21 1112   Post-Procedure Volume (cm^3) 27.92 cm^3 06/14/21 1112   Distance Tunneling (cm) 0 cm 06/14/21 1043   Undermining Maxium Distance (cm) 0 06/14/21 1043   Wound Assessment Pink/red;Slough 06/14/21 1043   Drainage Amount Large 06/14/21 1043   Drainage Description Yellow;Brown 06/14/21 1043   Odor Mild 06/14/21 1043   Kiah-wound Assessment Maceration 06/14/21 1043   Margins Undefined edges; Unattached edges 06/14/21 1043   Wound Thickness Description not for Pressure Injury Full thickness 06/14/21 1043   Number of days: 42       Wound 05/03/21 Leg Lower;Right;Posterior #3 (Active)   Wound Image   05/14/21 1032   Wound Etiology Venous 05/03/21 1120   Wound Cleansed Cleansed with saline 06/14/21 1043   Dressing/Treatment Alginate with Ag 06/14/21 1201   Wound Length (cm) 6 cm 06/14/21 1043   Wound Width (cm) 6.5 cm 06/14/21 1043   Wound Depth (cm) 0.1 cm 06/14/21 1043   Wound Surface Area (cm^2) 39 cm^2 06/14/21 1043   Change in Wound Size % (l*w) -20 06/14/21 1043 Wound Volume (cm^3) 3.9 cm^3 06/14/21 1043   Wound Healing % -20 06/14/21 1043   Post-Procedure Length (cm) 6.1 cm 06/14/21 1112   Post-Procedure Width (cm) 6.6 cm 06/14/21 1112   Post-Procedure Depth (cm) 0.3 cm 06/14/21 1112   Post-Procedure Surface Area (cm^2) 40.26 cm^2 06/14/21 1112   Post-Procedure Volume (cm^3) 12.08 cm^3 06/14/21 1112   Distance Tunneling (cm) 0 cm 06/14/21 1043   Undermining Maxium Distance (cm) 0 06/14/21 1043   Wound Assessment Pink/red;Slough 06/14/21 1043   Drainage Amount Moderate 06/14/21 1043   Drainage Description Yellow;Brown 06/14/21 1043   Odor Mild 06/14/21 1043   Kiah-wound Assessment Maceration 06/14/21 1043   Margins Defined edges 06/14/21 1043   Wound Thickness Description not for Pressure Injury Full thickness 06/14/21 1043   Number of days: 42          Percent of Wound Debrided: 100%    Total Surface Area Debrided:  172 sq cm    Diabetic/Pressure/Non Pressure Ulcers only:  Ulcer: Non-Pressure ulcer, fat layer exposed    Bleeding: Minimal    Hemostasis Achieved: by pressure    Procedural Pain: 2  / 10     Post Procedural Pain: 0 / 10     Response to treatment:  Well tolerated by patient. I have once again explained to the patient that his inability to maintain weekly appointments because of insurance constraints makes wound healing very difficult. The wounds have now once again deteriorated. He is not receiving adequate compression. In addition most recent cultures shows a Pseudomonas type of growth that requires IV antibiotics. I have asked the patient to see Dr. Maxine Coello from infectious disease possible placement of a PICC line and IV antibiotics. We did an aggressive debridement today and placed him in a 4 layer compression wrap. However it is up to the patient to return for follow-up.   Ultimately I am afraid he is going to end up in the emergency room with cellulitis of the lower extremity        Plan:     Treatment Note: Please see attached Discharge possible.  When to change Dressing: [] Daily [] Every Other Day [] Once a week  [] Three times per week: [] Monday, Wednesday, Friday [] Tuesday, Thursday, Saturday  [x] Do Not Change Dressing [] Other:     Edema Control:  Apply: [x] Compression Stocking  [x] Left Lower Leg [] Right Lower Leg          Apply every morning immediately when getting up. They should be applied to affected leg(s) from mid foot to knee making sure to cover the heel. Remove every night before going to bed. [x] Elevate leg(s) above the level of the heart for 30 minutes 4-5 times a day and/or when sitting. [x] Avoid prolonged standing in one place. Multilayer Compression Wrap:    Type: 4 Layer Compression Wrap   · Applied in Clinic to the :  Right lower leg(s) on 6/14/2021  · Do not get leg(s) with wrap wet. · If wraps become too tight call the center or completely remove the wrap. · Elevate leg(s) above the level of the heart when sitting. · Avoid prolonged standing in one place. Lymphedema Therapy:  [x] Wear Lymphedema pumps twice a day at settings prescribed by your physician. [x]Elevate leg(s) above the level of the heart for 30 minutes 4-5 times a day and/or when sitting. [x] Avoid prolonged standing in one place. Dietary:  Important dietary reminders:  1. Increase Protein intake (i.e. Lean meats, fish, eggs, legumes, and yogurt)  2. No added salt  3. If diabetic, follow a diabetic diet and check glucose prior to meals or as instructed by your physician. If you are still having pain after you go home:   For wounds on lower legs or arms, elevate the affected limb.  Use over-the-counter medications you would normally use for pain as permitted by your primary care doctor.  For persistent pain not relieved by the above interventions, please call your primary care doctor.      Return Appointment:   Return Appointment: With Dr. Renée Guadalupe  in  1 Week(s)  o Scheduled weekly until (Date):      [x] Return Appointment for a Wound Assessment with a nurse on: 06/18/21    []DME/Wound Dressing Supplies ordered at this visit: []Yes []No  o Supplies Provided by:   o Please call them directly to reorder supplies when you run out.  o CONTINUE TO USE THE SUPPLIES YOU HAVE AVAILABLE. IT IS MOST IMPORTANT TO KEEP THE WOUND COVERED AT ALL TIMES. [x] Orders placed during your visit:   Orders Placed This Encounter   Procedures    Initiate Outpatient Wound Care Protocol       Your nurse  is:  Henrique Vuong     Electronically signed by Oswaldo Brumfield RN on 6/14/2021 at [id number] Information: Should you experience any significant changes in your wound(s) or have questions about your wound care, please contact the [address] [place] [address] at [phone]. We are open from 8:00am - 4:30p Monday thru Friday except for Wednesdays which we are closed. Please give us 24-48 hours to return your call. Call your doctor now or seek immediate medical care if:    · You have symptoms of infection, such as:  ? Increased pain, swelling, warmth, or redness. ? Red streaks leading from the area. ? Pus draining from the area. ? A fever.         Physician for this visit and orders: Teddy aMyo MD    [] Patient unable to sign Discharge Instructions given to ECF/Transportation/POA        Electronically signed by Teddy Mayo MD on 6/14/2021 at 2:26 PM

## 2021-06-16 ENCOUNTER — OFFICE VISIT (OUTPATIENT)
Dept: INFECTIOUS DISEASES | Age: 56
End: 2021-06-16
Payer: COMMERCIAL

## 2021-06-16 VITALS
WEIGHT: 243 LBS | HEART RATE: 79 BPM | DIASTOLIC BLOOD PRESSURE: 68 MMHG | BODY MASS INDEX: 38.06 KG/M2 | SYSTOLIC BLOOD PRESSURE: 120 MMHG | OXYGEN SATURATION: 99 %

## 2021-06-16 DIAGNOSIS — L97.919 LEG ULCER, RIGHT, WITH UNSPECIFIED SEVERITY (HCC): ICD-10-CM

## 2021-06-16 DIAGNOSIS — I87.311 IDIOPATHIC CHRONIC VENOUS HYPERTENSION OF RIGHT LOWER EXTREMITY WITH ULCER (HCC): ICD-10-CM

## 2021-06-16 DIAGNOSIS — A49.8 PSEUDOMONAS INFECTION: Primary | ICD-10-CM

## 2021-06-16 DIAGNOSIS — L97.919 IDIOPATHIC CHRONIC VENOUS HYPERTENSION OF RIGHT LOWER EXTREMITY WITH ULCER (HCC): ICD-10-CM

## 2021-06-16 DIAGNOSIS — M79.89 PAIN AND SWELLING OF RIGHT LOWER LEG: ICD-10-CM

## 2021-06-16 DIAGNOSIS — M79.661 PAIN AND SWELLING OF RIGHT LOWER LEG: ICD-10-CM

## 2021-06-16 PROCEDURE — 99204 OFFICE O/P NEW MOD 45 MIN: CPT | Performed by: INTERNAL MEDICINE

## 2021-06-16 RX ORDER — CLINDAMYCIN HYDROCHLORIDE 300 MG/1
300 CAPSULE ORAL 3 TIMES DAILY
Qty: 90 CAPSULE | Refills: 0 | Status: SHIPPED | OUTPATIENT
Start: 2021-06-16 | End: 2021-07-28 | Stop reason: SDUPTHER

## 2021-06-16 NOTE — PROGRESS NOTES
Infectious Diseases Consult Note    Reason for Consult:   R leg wound infection  Requesting Physician:   Dr Tiney Kayser  Primary Care Physician:  Jen Montanez DO  History Obtained From:   Patient, EPIC    CHIEF COMPLAINT:      Chief Complaint   Patient presents with    Wound Infection     Right lower leg wound       HISTORY OF PRESENT ILLNESS:      63 yo man with hx LE DVT  Pt with chronic LE edema, L > R, chronic R LE wounds, followed at [place] [address] 4/2018, had OR debridement 4/24/18 ([facility]). Hx MDR Pseudomonas aeruginosa    Pain worse starting end of April 2021. Assoc with increase in wound drainage  Rx doxycycline with some improvement in pain and drainage.   Doxy ended on 6/13    Wound cult 5/3/21 - mod Ps aeruginosa, MSSA, C striatum  Pseudomonas aeruginosa (1)  Antibiotic Interpretation JIGNESH   cefepime Sensitive 8 mcg/mL   ciprofloxacin Resistant >2 mcg/mL   gentamicin Sensitive <=4 mcg/mL   meropenem Sensitive <=1 mcg/mL   piperacillin-tazobactam Intermediate 32 mcg/mL   tobramycin Sensitive <=4 mcg/mL     Staphylococcus aureus (2)  Antibiotic Interpretation JIGNESH   clindamycin Sensitive <=0.25 mcg/mL   erythromycin Resistant >=8 mcg/mL   oxacillin Sensitive 0.5 mcg/mL   tetracycline Sensitive <=1 mcg/mL   trimethoprim-sulfamethoxazole Sensitive <=10 mcg/mL     Last visit [facility] 6/14/21 -   'foul-smelling draining wounds'    Past Medical History:    Past Medical History:   Diagnosis Date    DVT (deep venous thrombosis) ([address]     Hx of blood clots     Pain and swelling of right lower leg        Past Surgical History:    Past Surgical History:   Procedure Laterality Date    BALLOON ANGIOPLASTY, ARTERY Right 12/19/2017    at [facility] [address] Right        Current Medications:    Current Outpatient Medications   Medication Sig Dispense Refill    warfarin (COUMADIN) 5 MG tablet TAKE 1 AND 1/2 TABLET BY MOUTH DAILY EXCEPT TAKE 2 TABLETS ON FRIDAY 48 tablet 0    Wound Cleansers (VASHE CLEANSING) SOLN Apply 5 mLs topically three times a week vashe wash soaked gauze 5-10 minutes  to right lower leg wounds with each dressing change 250 Bottle 0    Compression Stockings MISC by Does not apply route 40-50mmHG bilateral knee high, closed toe, custom fitted zipper stockings    ICD 10- I87.311, L97.919 1 each 3    Handicap Placard MISC by Does not apply route Dx Lower loeg DVT chronic, bilateral. Effective 2/12/2018 until 2/12/2021. 1 each 0     No current facility-administered medications for this visit. Allergies:  Patient has no known allergies. Social History:    TOBACCO:                OTHH      ETOH:                        AGEU      DRUGS:                     None      MARITAL STATUS:                 OCCUPATION:          [employer] and     Family History:   No immunodeficiency     REVIEW OF SYSTEMS:    No fever / chills / sweats. No weight loss. No visual change, eye pain, eye discharge. No oral lesion, sore throat, dysphagia. Denies cough / sputum. Denies chest pain, palpitations. Denies n / v / abd pain. No diarrhea. Denies dysuria or change in urinary function. Denies joint swelling or pain. No myalgia, arthralgia. Denies skin changes, itching  Denies new / worse depression, psychiatric symptoms  Denies focal weakness, sensory change or other neurologic symptom  No symptoms endocrinopathy. No symptoms hematologic disease. PHYSICAL EXAM:    Vitals:  See intake vitals including weight    GENERAL: No apparent distress.     HEENT: Membranes moist, no oral lesion  NECK:  Supple  LYMPH: No adenopathy   LUNGS: Clear b/l, no rales, no dullness  CARDIAC: RRR, no murmur appreciated  ABD:  + BS, soft / NT  EXT:  No rash, no edema, no lesions  NEURO: No focal neurologic findings  PSYCH: Orientation, sensorium, mood normal  Wound:  R LE circumferential wounds, clean, no purulence, mild odor ('fruity'_          2/19/18 Wound: light Ps aeruginosa  Antibiotic Interpretation JIGNESH Unit   cefepime Intermediate 16 mcg/mL   ciprofloxacin Resistant >2 mcg/mL   gentamicin Sensitive <=4 mcg/mL   meropenem Resistant >8 mcg/mL   piperacillin-tazobactam Intermediate 32 mcg/mL   tobramycin Sensitive <=4 mcg/mL      1/5/18 Wound: light Ps fluorescens/putida, Ps aeruginosa  PSEUDOMONAS FLUORESCENS [facility]   Antibiotic Interpretation JIGNESH Unit   cefepime Intermediate 16 mcg/mL   ciprofloxacin Resistant >2 mcg/mL   gentamicin Sensitive <=4 mcg/mL   meropenem Resistant >8 mcg/mL   piperacillin-tazobactam Sensitive <=16 mcg/mL   tobramycin Sensitive <=4 mcg/mL          PSEUDOMONAS AERUGINOSA   Antibiotic Interpretation JIGNESH Unit   cefepime Sensitive 4 mcg/mL   ciprofloxacin Resistant >2 mcg/mL   gentamicin Sensitive <=4 mcg/mL   meropenem Sensitive 2 mcg/mL   piperacillin-tazobactam Sensitive <=16 mcg/mL   tobramycin Sensitive <=4 mcg/mL            IMPRESSION    Hx DVT, LE edema  L LE chronic wounds  - OR debridement 4/18   - last cult 5/2021 with MSSA, C striatum, Ps aeruginosa   - improved with po doxycycline   Today 6/16- no cellulitis, no gross infection    Discussed using iv antibiotics - would need PICC, iv mult time a day   Pt hesitant - did not want to start at this time      RECOMMENDATIONS:      Start clindamycin 300 tid - gram pos activity, would not have impact on Pseudomonas  Wound care per [facility] [address]    Return in 3 - 4 weeks     May need OR debridement (use verijet)  May need iv antibiotic in future     - Spent 55 minutes on visit (including history, physical exam, review of data, development and implementation of treatment plan and coordination of care. - Over 50% of time spent in pt counseling and education.     Carrol Kellogg MD

## 2021-06-16 NOTE — LETTER
[address] [facility] Infectious Disease  [address]  Phone: [phone]  Fax: [phone]           Eileen Clements MD      June 16, 2021     Patient: Socorro Weinberg   MR Number: [id number]   YOB: 1965   Date of Visit: 6/16/2021       Dear Dr. Farooq Nichols ref. provider found: Thank you for referring Socroro Weinberg to me for evaluation/treatment. Below are the relevant portions of my assessment and plan of care. If you have questions, please do not hesitate to call me. I look forward to following Justin along with you.     Sincerely,    MD Eileen Tenorio MD    CC providers:  Obed Hayden MD  [address] [place] [id number]  [place] [place]

## 2021-06-18 ENCOUNTER — HOSPITAL ENCOUNTER (OUTPATIENT)
Dept: WOUND CARE | Age: 56
Discharge: HOME | End: 2021-06-18
Payer: COMMERCIAL

## 2021-06-18 VITALS
DIASTOLIC BLOOD PRESSURE: 83 MMHG | RESPIRATION RATE: 16 BRPM | HEART RATE: 73 BPM | TEMPERATURE: 97.8 F | SYSTOLIC BLOOD PRESSURE: 149 MMHG

## 2021-06-18 DIAGNOSIS — I89.0 CHRONIC ACQUIRED LYMPHEDEMA: ICD-10-CM

## 2021-06-18 DIAGNOSIS — L97.919 IDIOPATHIC CHRONIC VENOUS HYPERTENSION OF RIGHT LOWER EXTREMITY WITH ULCER (HCC): ICD-10-CM

## 2021-06-18 DIAGNOSIS — I87.311 IDIOPATHIC CHRONIC VENOUS HYPERTENSION OF RIGHT LOWER EXTREMITY WITH ULCER (HCC): ICD-10-CM

## 2021-06-18 DIAGNOSIS — Z91.199 NONCOMPLIANCE: ICD-10-CM

## 2021-06-18 PROCEDURE — 29581 APPL MULTLAYER CMPRN SYS LEG: CPT

## 2021-06-18 ASSESSMENT — PAIN DESCRIPTION - FREQUENCY: FREQUENCY: CONTINUOUS

## 2021-06-18 ASSESSMENT — PAIN DESCRIPTION - ONSET: ONSET: ON-GOING

## 2021-06-18 ASSESSMENT — PAIN DESCRIPTION - ORIENTATION: ORIENTATION: RIGHT

## 2021-06-18 ASSESSMENT — PAIN SCALES - GENERAL: PAINLEVEL_OUTOF10: 7

## 2021-06-18 ASSESSMENT — PAIN DESCRIPTION - LOCATION: LOCATION: LEG

## 2021-06-18 ASSESSMENT — PAIN DESCRIPTION - PAIN TYPE: TYPE: CHRONIC PAIN

## 2021-06-20 DIAGNOSIS — I82.5Z1 CHRONIC VENOUS EMBOLISM AND THROMBOSIS OF DEEP VESSELS OF DISTAL END OF RIGHT LOWER EXTREMITY (HCC): ICD-10-CM

## 2021-06-20 DIAGNOSIS — I82.220 THROMBOSIS OF INFERIOR VENA CAVA (HCC): ICD-10-CM

## 2021-06-20 DIAGNOSIS — Z79.01 CHRONIC ANTICOAGULATION: ICD-10-CM

## 2021-06-20 DIAGNOSIS — I82.423 THROMBOSIS OF BOTH ILIAC VEINS (HCC): ICD-10-CM

## 2021-06-21 ENCOUNTER — HOSPITAL ENCOUNTER (OUTPATIENT)
Dept: WOUND CARE | Age: 56
Discharge: HOME | End: 2021-06-21
Payer: COMMERCIAL

## 2021-06-21 VITALS
RESPIRATION RATE: 16 BRPM | TEMPERATURE: 97.3 F | SYSTOLIC BLOOD PRESSURE: 151 MMHG | DIASTOLIC BLOOD PRESSURE: 89 MMHG | HEART RATE: 75 BPM

## 2021-06-21 DIAGNOSIS — Z91.199 NONCOMPLIANCE: Primary | ICD-10-CM

## 2021-06-21 DIAGNOSIS — I87.311 IDIOPATHIC CHRONIC VENOUS HYPERTENSION OF RIGHT LOWER EXTREMITY WITH ULCER (HCC): ICD-10-CM

## 2021-06-21 DIAGNOSIS — L97.919 IDIOPATHIC CHRONIC VENOUS HYPERTENSION OF RIGHT LOWER EXTREMITY WITH ULCER (HCC): ICD-10-CM

## 2021-06-21 DIAGNOSIS — I89.0 CHRONIC ACQUIRED LYMPHEDEMA: ICD-10-CM

## 2021-06-21 PROCEDURE — 29581 APPL MULTLAYER CMPRN SYS LEG: CPT

## 2021-06-21 PROCEDURE — 11045 DBRDMT SUBQ TISS EACH ADDL: CPT

## 2021-06-21 PROCEDURE — 6370000000 HC RX 637 (ALT 250 FOR IP): Performed by: SPECIALIST

## 2021-06-21 PROCEDURE — 11042 DBRDMT SUBQ TIS 1ST 20SQCM/<: CPT | Performed by: SPECIALIST

## 2021-06-21 PROCEDURE — 11042 DBRDMT SUBQ TIS 1ST 20SQCM/<: CPT

## 2021-06-21 RX ORDER — GINSENG 100 MG
CAPSULE ORAL ONCE
Status: CANCELLED | OUTPATIENT
Start: 2021-06-21 | End: 2021-06-21

## 2021-06-21 RX ORDER — LIDOCAINE 40 MG/G
CREAM TOPICAL ONCE
Status: CANCELLED | OUTPATIENT
Start: 2021-06-21 | End: 2021-06-21

## 2021-06-21 RX ORDER — LIDOCAINE HYDROCHLORIDE 40 MG/ML
SOLUTION TOPICAL ONCE
Status: COMPLETED | OUTPATIENT
Start: 2021-06-21 | End: 2021-06-21

## 2021-06-21 RX ORDER — GENTAMICIN SULFATE 1 MG/G
OINTMENT TOPICAL ONCE
Status: CANCELLED | OUTPATIENT
Start: 2021-06-21 | End: 2021-06-21

## 2021-06-21 RX ORDER — LIDOCAINE HYDROCHLORIDE 20 MG/ML
JELLY TOPICAL ONCE
Status: CANCELLED | OUTPATIENT
Start: 2021-06-21 | End: 2021-06-21

## 2021-06-21 RX ORDER — BACITRACIN ZINC AND POLYMYXIN B SULFATE 500; 1000 [USP'U]/G; [USP'U]/G
OINTMENT TOPICAL ONCE
Status: CANCELLED | OUTPATIENT
Start: 2021-06-21 | End: 2021-06-21

## 2021-06-21 RX ORDER — BACITRACIN, NEOMYCIN, POLYMYXIN B 400; 3.5; 5 [USP'U]/G; MG/G; [USP'U]/G
OINTMENT TOPICAL ONCE
Status: CANCELLED | OUTPATIENT
Start: 2021-06-21 | End: 2021-06-21

## 2021-06-21 RX ORDER — BETAMETHASONE DIPROPIONATE 0.05 %
OINTMENT (GRAM) TOPICAL ONCE
Status: CANCELLED | OUTPATIENT
Start: 2021-06-21 | End: 2021-06-21

## 2021-06-21 RX ORDER — CLOBETASOL PROPIONATE 0.5 MG/G
OINTMENT TOPICAL ONCE
Status: CANCELLED | OUTPATIENT
Start: 2021-06-21 | End: 2021-06-21

## 2021-06-21 RX ORDER — LIDOCAINE HYDROCHLORIDE 40 MG/ML
SOLUTION TOPICAL ONCE
Status: CANCELLED | OUTPATIENT
Start: 2021-06-21 | End: 2021-06-21

## 2021-06-21 RX ORDER — LIDOCAINE 50 MG/G
OINTMENT TOPICAL ONCE
Status: CANCELLED | OUTPATIENT
Start: 2021-06-21 | End: 2021-06-21

## 2021-06-21 RX ADMIN — LIDOCAINE HYDROCHLORIDE: 40 SOLUTION TOPICAL at 08:31

## 2021-06-21 ASSESSMENT — PAIN DESCRIPTION - ORIENTATION: ORIENTATION: RIGHT

## 2021-06-21 ASSESSMENT — PAIN DESCRIPTION - LOCATION: LOCATION: LEG

## 2021-06-21 ASSESSMENT — PAIN SCALES - GENERAL: PAINLEVEL_OUTOF10: 5

## 2021-06-21 ASSESSMENT — PAIN DESCRIPTION - PAIN TYPE: TYPE: CHRONIC PAIN

## 2021-06-21 ASSESSMENT — PAIN DESCRIPTION - DESCRIPTORS: DESCRIPTORS: BURNING

## 2021-06-21 ASSESSMENT — PAIN DESCRIPTION - FREQUENCY: FREQUENCY: CONTINUOUS

## 2021-06-21 NOTE — PROGRESS NOTES
Multilayer Compression Wrap   (Not Unna) Below the Knee    NAME:  Alisa Mcqueen  YOB: 1965  MEDICAL RECORD NUMBER:  [id number]  DATE:  6/21/2021        Removed old Multilayer wrap if present and washed leg with mild soap/water.   Applied moisturizing agent to dry skin as needed.   Applied primary and secondary dressing as ordered     Applied multilayered dressing below the knee to Right lower leg(s)  (4 Layer Compression Wrap ) .   Instructed patient/caregiver not to remove dressing and to keep it clean and dry.   Instructed patient/caregiver on complications to report to provider, such as pain, numbness in toes, heavy drainage, and slippage of dressing.   Instructed patient on purpose of compression dressing and on activity and exercise recommendations.     Applied per   Guidelines    Electronically signed by Sol Isaacs RN on 6/21/2021 at 9:01 AM

## 2021-06-21 NOTE — PROGRESS NOTES
[facility]  Progress Note and Procedure Note      Shea Zaman  MEDICAL RECORD NUMBER:  [id number]  AGE: 64 y.o. GENDER: male  : 1965  EPISODE DATE:  2021    Subjective:     Chief Complaint   Patient presents with    Wound Check     right leg         HISTORY of PRESENT ILLNESS HPI     Shea Zaman is a 64 y.o. male who presents today for wound/ulcer evaluation. History of Wound Context: Patient returns today for follow-up of his chronic venous insufficiency and lymphedema lower extremities. Recently seen infectious disease who recommended IV antibiotics for his Pseudomonas infection but patient once again refuses appropriate treatment. His right lower extremity has been wrapped for the last 72 hours in 4-layer compression  Pain Assessment:  Wound/Ulcer Pain Timing/Severity: none  Quality of pain: N/A  Severity:  0 / 10   Modifying Factors: None  Associated Signs/Symptoms: edema, drainage and odor    Ulcer Identification:  Ulcer Type: venous  Contributing Factors: edema, venous stasis, lymphedema, obesity and non-adherence    Objective:      BP (!) 151/89   Pulse 75   Temp 97.3 °F (36.3 °C) (Temporal)   Resp 16     Wt Readings from Last 3 Encounters:   21 243 lb (110.2 kg)   21 236 lb 12.4 oz (107.4 kg)   20 247 lb 12.8 oz (112.4 kg)       PHYSICAL EXAM    Extremities: no cyanosis, no clubbing, 3 +non edema-  right lower extremity and multiple full-thickness wounds nearly circumferential in nature right knee containing fibrin, slough, granulation tissue    Assessment:      1. Noncompliance    2. Chronic acquired lymphedema    3. Idiopathic chronic venous hypertension of right lower extremity with ulcer ([address]         Procedure Note  Indications:  Based on my examination of this patient's wound(s) today, sharp excision is required to promote healing and evaluate the extent healing. Performed by: Willard Schumacher MD    Consent obtained?  Yes    Time out taken: Yes    Pain Control: Anesthetic: 4% Lidocaine Liquid Topical     Debridement:Excisional Debridement    Using curette the wound was sharply debrided    down through and including the removal of  epidermis, dermis and subcutaneous tissue. Devitalized Tissue Debrided:  fibrin, biofilm and slough      Pre Debridement Measurements:  Are located in the Wound Documentation Flow Sheet   Wound #: 1, 2 and 3     Post  Debridement Measurements:  Wound 05/03/21 Leg Right;Medial;Lower #1 (Active)   Wound Image   05/14/21 1032   Wound Etiology Venous 05/03/21 1120   Wound Cleansed Cleansed with saline 06/21/21 0831   Dressing/Treatment Alginate with Ag 06/18/21 1001   Wound Length (cm) 5.5 cm 06/21/21 0831   Wound Width (cm) 4.6 cm 06/21/21 0831   Wound Depth (cm) 0.1 cm 06/21/21 0831   Wound Surface Area (cm^2) 25.3 cm^2 06/21/21 0831   Change in Wound Size % (l*w) 31.34 06/21/21 0831   Wound Volume (cm^3) 2.53 cm^3 06/21/21 0831   Wound Healing % 31 06/21/21 0831   Post-Procedure Length (cm) 5.6 cm 06/21/21 0851   Post-Procedure Width (cm) 4.7 cm 06/21/21 0851   Post-Procedure Depth (cm) 0.3 cm 06/21/21 0851   Post-Procedure Surface Area (cm^2) 26.32 cm^2 06/21/21 0851   Post-Procedure Volume (cm^3) 7.9 cm^3 06/21/21 0851   Distance Tunneling (cm) 0 cm 06/21/21 0831   Undermining Maxium Distance (cm) 0 06/21/21 0831   Wound Assessment Pink/red;Fibrin 06/21/21 0831   Drainage Amount Moderate 06/21/21 0831   Drainage Description Yellow;Brown 06/21/21 0831   Odor Mild 06/21/21 0831   Kiah-wound Assessment Intact 06/21/21 0831   Margins Defined edges 06/21/21 0831   Wound Thickness Description not for Pressure Injury Full thickness 06/21/21 0831   Number of days: 48       Wound 05/03/21 Leg Anterior; Lower;Right #2 cluster (Active)   Wound Image   05/14/21 1032   Wound Etiology Venous 05/03/21 1120   Wound Cleansed Cleansed with saline 06/21/21 0831   Dressing/Treatment Alginate with Ag 06/18/21 1001   Wound Length (cm) 5 cm 06/21/21 0831   Wound Width (cm) 9 cm 06/21/21 0831   Wound Depth (cm) 0.1 cm 06/21/21 0831   Wound Surface Area (cm^2) 45 cm^2 06/21/21 0831   Change in Wound Size % (l*w) 74.08 06/21/21 0831   Wound Volume (cm^3) 4.5 cm^3 06/21/21 0831   Wound Healing % 74 06/21/21 0831   Post-Procedure Length (cm) 5.1 cm 06/21/21 0851   Post-Procedure Width (cm) 9.1 cm 06/21/21 0851   Post-Procedure Depth (cm) 0.3 cm 06/21/21 0851   Post-Procedure Surface Area (cm^2) 46.41 cm^2 06/21/21 0851   Post-Procedure Volume (cm^3) 13.92 cm^3 06/21/21 0851   Distance Tunneling (cm) 0 cm 06/21/21 0831   Undermining Maxium Distance (cm) 0 06/21/21 0831   Wound Assessment Pink/red;Slough 06/21/21 0831   Drainage Amount Moderate 06/21/21 0831   Drainage Description Yellow;Brown 06/21/21 0831   Odor Mild 06/21/21 0831   Kiah-wound Assessment Intact 06/21/21 0831   Margins Undefined edges; Unattached edges 06/21/21 0831   Wound Thickness Description not for Pressure Injury Full thickness 06/21/21 0831   Number of days: 48       Wound 05/03/21 Leg Lower;Right;Posterior #3 (Active)   Wound Image   05/14/21 1032   Wound Etiology Venous 05/03/21 1120   Wound Cleansed Cleansed with saline 06/21/21 0831   Dressing/Treatment Alginate with Ag 06/18/21 1001   Wound Length (cm) 3.5 cm 06/21/21 0831   Wound Width (cm) 5 cm 06/21/21 0831   Wound Depth (cm) 0.1 cm 06/21/21 0831   Wound Surface Area (cm^2) 17.5 cm^2 06/21/21 0831   Change in Wound Size % (l*w) 46.15 06/21/21 0831   Wound Volume (cm^3) 1.75 cm^3 06/21/21 0831   Wound Healing % 46 06/21/21 0831   Post-Procedure Length (cm) 3.6 cm 06/21/21 0851   Post-Procedure Width (cm) 5.1 cm 06/21/21 0851   Post-Procedure Depth (cm) 0.3 cm 06/21/21 0851   Post-Procedure Surface Area (cm^2) 18.36 cm^2 06/21/21 0851   Post-Procedure Volume (cm^3) 5.51 cm^3 06/21/21 0851   Distance Tunneling (cm) 0 cm 06/21/21 0831   Undermining Maxium Distance (cm) 0 06/21/21 0831   Wound Assessment Pink/red;Slough 06/21/21 0831   Drainage Amount Moderate 06/21/21 0831   Drainage Description Yellow;Brown 06/21/21 0831   Odor Mild 06/21/21 0831   Kiah-wound Assessment Intact 06/21/21 0831   Margins Defined edges 06/21/21 0831   Wound Thickness Description not for Pressure Injury Full thickness 06/21/21 0831   Number of days: 48          Percent of Wound Debrided: 100%    Total Surface Area Debrided:  90 sq cm    Diabetic/Pressure/Non Pressure Ulcers only:  Ulcer: Non-Pressure ulcer, fat layer exposed    Bleeding: Minimal    Hemostasis Achieved: by pressure    Procedural Pain: 0  / 10     Post Procedural Pain: 0 / 10     Response to treatment:  Well tolerated by patient. Once again it was explained to the patient that in order to heal these wounds serial debridement, compression wrap must take place. In addition it was recommended that the patient have a PICC line inserted for IV antibiotics but he refused. Dr. Nawaf Han did place him on oral clindamycin will not cover the Pseudomonas organism. Patient also in need of compression stockings to wear on his left lower extremity. He states he is using his lymphedema pumps. It is understood that he needs to return here for weekly visits which would include debridement and compression. It is entirely up to him whether he follows through as he once again states that for insurance issues he cannot come weekly. Plan:     Treatment Note: Please see attached Discharge Instructions. These instructions were given and signed by the patient or POA    New Medication(s) at this visit:   New Prescriptions    No medications on file       Other orders at this visit:   Orders Placed This Encounter   Procedures   [address] Protocol       Discharge Instructions          [facility] Physician Orders and Discharge Instructions  [address] [address] [id number] [place] [address]. [address]  [address]  Telephone: [phone] hands with soap and water prior to and after every dressing change. Wound Cleansing: (All wounds are cleaned with 0.9% saline during your wound care visit)   · Do not scrub or use excessive force. · With each dressing change, rinse wounds with 0.9% Saline. (May use wound wash or soft contact solution. Both can be purchased at a local drug store). · If unable to obtain saline, may use a gentle soap and water. · Keep wounds dry in the shower unless otherwise instructed by the physician. · For wounds on lower legs, cast covers can be purchased at local drug stores, so that you may shower and keep the wound(s) dry. [] Instructions for Vashe Wash solution ONLY: Apply enough Vashe to soak a piece of gauze and place on wound bed for 5-10 minutes. DO NOT rinse after Vashe has been applied. Follow dressing application as instructed below. [x] Vashe Wash applied during clinic visit. Kiah wound Topical Treatments:  Do not apply lotions, creams, or ointments to the skin around the wound bed unless directed as followed:     [] Apply around the wound: [] moisturizing lotion [] Antifungal ointment [] No-Sting barrier film [] Zinc paste [] Other:       Dressings:           Wound Location: right lower leg wounds    Apply Primary Dressing to wound:       Alginate with silver pad     Cover and Secure with:     Cover and Secure with: Extra absorbant pad   Avoid contact of tape with skin if possible.  When to change Dressing: [] Daily [] Every Other Day [] Once a week  [] Three times per week: [] Monday, Wednesday, Friday [] Tuesday, Thursday, Saturday  [x] Do Not Change Dressing [] Other:       Edema Control:  Apply: [x] Compression Stocking  [x] Left Lower Leg [] Right Lower Leg          Apply every morning immediately when getting up. They should be applied to affected leg(s) from mid foot to knee making sure to cover the heel. Remove every night before going to bed.       [x] Elevate leg(s) above the level of the heart for 30 minutes 4-5 times a day and/or when sitting. [x] Avoid prolonged standing in one place. Multilayer Compression Wrap:    Type: 4 Layer Compression Wrap   · Applied in Clinic to the :  Right lower leg(s) on 6/21/2021  · Do not get leg(s) with wrap wet. · If wraps become too tight call the center or completely remove the wrap. · Elevate leg(s) above the level of the heart when sitting. · Avoid prolonged standing in one place. Lymphedema Therapy:  [x] Wear Lymphedema pumps twice a day at settings prescribed by your physician. [x]Elevate leg(s) above the level of the heart for 30 minutes 4-5 times a day and/or when sitting. [x] Avoid prolonged standing in one place. Dietary:  Important dietary reminders:  1. Increase Protein intake (i.e. Lean meats, fish, eggs, legumes, and yogurt)  2. No added salt  3. If diabetic, follow a diabetic diet and check glucose prior to meals or as instructed by your physician. If you are still having pain after you go home:   For wounds on lower legs or arms, elevate the affected limb.  Use over-the-counter medications you would normally use for pain as permitted by your primary care doctor.  For persistent pain not relieved by the above interventions, please call your primary care doctor. Return Appointment:   Return Appointment: With Dr. Bobby Meraz  in  [address] [place] [address])  o Scheduled weekly until (Date):      [x] Return Appointment for a Wound Assessment with a nurse on: 06/25/21  []DME/Wound Dressing Supplies ordered at this visit: []Yes []No  o Supplies Provided by:   o Please call them directly to reorder supplies when you run out.  o CONTINUE TO USE THE SUPPLIES YOU HAVE AVAILABLE. IT IS MOST IMPORTANT TO KEEP THE WOUND COVERED AT ALL TIMES.     [x] Orders placed during your visit:   Orders Placed This Encounter   Procedures   Mj Valenzuela for stocking fitting  Phone: [phone] [facility], [address]    Your nurse  is:  Arlene Saunders     Electronically signed by Sadie Mckinley RN on 6/21/2021 at 8:56 AM     [address] Information: Should you experience any significant changes in your wound(s) or have questions about your wound care, please contact the [facility] at [phone]. We are open from 8:00am - 4:30p Monday thru Friday except for Wednesdays which we are closed. Please give us 24-48 hours to return your call. Call your doctor now or seek immediate medical care if:    · You have symptoms of infection, such as:  ? Increased pain, swelling, warmth, or redness. ? Red streaks leading from the area. ? Pus draining from the area. ? A fever.         Physician for this visit and orders: Scooby Anders MD    [] Patient unable to sign Discharge Instructions given to ECF/Transportation/POA        Electronically signed by Scooby Anders MD on 6/21/2021 at 9:00 AM

## 2021-06-22 RX ORDER — WARFARIN SODIUM 5 MG/1
TABLET ORAL
Qty: 48 TABLET | Refills: 0 | Status: SHIPPED | OUTPATIENT
Start: 2021-06-22 | End: 2021-07-19

## 2021-06-25 ENCOUNTER — HOSPITAL ENCOUNTER (OUTPATIENT)
Dept: WOUND CARE | Age: 56
Discharge: HOME | End: 2021-06-25
Payer: COMMERCIAL

## 2021-06-25 VITALS
RESPIRATION RATE: 161 BRPM | DIASTOLIC BLOOD PRESSURE: 81 MMHG | SYSTOLIC BLOOD PRESSURE: 146 MMHG | HEART RATE: 75 BPM | TEMPERATURE: 97 F

## 2021-06-25 DIAGNOSIS — Z91.199 NONCOMPLIANCE: ICD-10-CM

## 2021-06-25 DIAGNOSIS — I87.311 IDIOPATHIC CHRONIC VENOUS HYPERTENSION OF RIGHT LOWER EXTREMITY WITH ULCER (HCC): ICD-10-CM

## 2021-06-25 DIAGNOSIS — L97.919 IDIOPATHIC CHRONIC VENOUS HYPERTENSION OF RIGHT LOWER EXTREMITY WITH ULCER (HCC): ICD-10-CM

## 2021-06-25 DIAGNOSIS — I89.0 CHRONIC ACQUIRED LYMPHEDEMA: ICD-10-CM

## 2021-06-25 PROCEDURE — 29581 APPL MULTLAYER CMPRN SYS LEG: CPT

## 2021-06-25 ASSESSMENT — PAIN DESCRIPTION - PROGRESSION: CLINICAL_PROGRESSION: NOT CHANGED

## 2021-06-25 ASSESSMENT — PAIN DESCRIPTION - LOCATION: LOCATION: LEG

## 2021-06-25 ASSESSMENT — PAIN DESCRIPTION - ORIENTATION: ORIENTATION: RIGHT

## 2021-06-25 ASSESSMENT — PAIN DESCRIPTION - PAIN TYPE: TYPE: CHRONIC PAIN

## 2021-06-25 ASSESSMENT — PAIN DESCRIPTION - FREQUENCY: FREQUENCY: CONTINUOUS

## 2021-06-25 ASSESSMENT — PAIN DESCRIPTION - ONSET: ONSET: ON-GOING

## 2021-06-25 ASSESSMENT — PAIN DESCRIPTION - DESCRIPTORS: DESCRIPTORS: BURNING

## 2021-06-25 ASSESSMENT — PAIN SCALES - GENERAL: PAINLEVEL_OUTOF10: 5

## 2021-06-25 NOTE — PROGRESS NOTES
Multilayer Compression Wrap   (Not Unna) Below the Knee    NAME:  Everardo Bravo  YOB: 1965  MEDICAL RECORD NUMBER:  [id number]  DATE:  6/25/2021        Removed old Multilayer wrap if present and washed leg with mild soap/water.   Applied moisturizing agent to dry skin as needed.   Applied primary and secondary dressing as ordered     Applied multilayered dressing below the knee to Right lower leg(s)  (4 Layer Compression Wrap ) .   Instructed patient/caregiver not to remove dressing and to keep it clean and dry.   Instructed patient/caregiver on complications to report to provider, such as pain, numbness in toes, heavy drainage, and slippage of dressing.   Instructed patient on purpose of compression dressing and on activity and exercise recommendations.     Applied per   Guidelines    Electronically signed by Rosales Van RN on 6/25/2021 at 11:23 AM

## 2021-06-28 ENCOUNTER — HOSPITAL ENCOUNTER (OUTPATIENT)
Dept: WOUND CARE | Age: 56
Discharge: HOME | End: 2021-06-28
Payer: COMMERCIAL

## 2021-06-28 VITALS
SYSTOLIC BLOOD PRESSURE: 148 MMHG | RESPIRATION RATE: 16 BRPM | DIASTOLIC BLOOD PRESSURE: 69 MMHG | TEMPERATURE: 97.1 F | HEART RATE: 75 BPM

## 2021-06-28 DIAGNOSIS — Z91.199 NONCOMPLIANCE: Primary | ICD-10-CM

## 2021-06-28 DIAGNOSIS — I89.0 CHRONIC ACQUIRED LYMPHEDEMA: ICD-10-CM

## 2021-06-28 DIAGNOSIS — L97.919 IDIOPATHIC CHRONIC VENOUS HYPERTENSION OF RIGHT LOWER EXTREMITY WITH ULCER (HCC): ICD-10-CM

## 2021-06-28 DIAGNOSIS — I87.311 IDIOPATHIC CHRONIC VENOUS HYPERTENSION OF RIGHT LOWER EXTREMITY WITH ULCER (HCC): ICD-10-CM

## 2021-06-28 PROCEDURE — 29581 APPL MULTLAYER CMPRN SYS LEG: CPT

## 2021-06-28 PROCEDURE — 6370000000 HC RX 637 (ALT 250 FOR IP): Performed by: SPECIALIST

## 2021-06-28 PROCEDURE — 11045 DBRDMT SUBQ TISS EACH ADDL: CPT | Performed by: SPECIALIST

## 2021-06-28 PROCEDURE — 11042 DBRDMT SUBQ TIS 1ST 20SQCM/<: CPT | Performed by: SPECIALIST

## 2021-06-28 PROCEDURE — 11042 DBRDMT SUBQ TIS 1ST 20SQCM/<: CPT

## 2021-06-28 PROCEDURE — 11045 DBRDMT SUBQ TISS EACH ADDL: CPT

## 2021-06-28 RX ORDER — BACITRACIN ZINC AND POLYMYXIN B SULFATE 500; 1000 [USP'U]/G; [USP'U]/G
OINTMENT TOPICAL ONCE
Status: CANCELLED | OUTPATIENT
Start: 2021-06-28 | End: 2021-06-28

## 2021-06-28 RX ORDER — LIDOCAINE 40 MG/G
CREAM TOPICAL ONCE
Status: CANCELLED | OUTPATIENT
Start: 2021-06-28 | End: 2021-06-28

## 2021-06-28 RX ORDER — CLOBETASOL PROPIONATE 0.5 MG/G
OINTMENT TOPICAL ONCE
Status: CANCELLED | OUTPATIENT
Start: 2021-06-28 | End: 2021-06-28

## 2021-06-28 RX ORDER — LIDOCAINE HYDROCHLORIDE 20 MG/ML
JELLY TOPICAL ONCE
Status: CANCELLED | OUTPATIENT
Start: 2021-06-28 | End: 2021-06-28

## 2021-06-28 RX ORDER — LIDOCAINE 50 MG/G
OINTMENT TOPICAL ONCE
Status: CANCELLED | OUTPATIENT
Start: 2021-06-28 | End: 2021-06-28

## 2021-06-28 RX ORDER — BACITRACIN, NEOMYCIN, POLYMYXIN B 400; 3.5; 5 [USP'U]/G; MG/G; [USP'U]/G
OINTMENT TOPICAL ONCE
Status: CANCELLED | OUTPATIENT
Start: 2021-06-28 | End: 2021-06-28

## 2021-06-28 RX ORDER — BETAMETHASONE DIPROPIONATE 0.05 %
OINTMENT (GRAM) TOPICAL ONCE
Status: CANCELLED | OUTPATIENT
Start: 2021-06-28 | End: 2021-06-28

## 2021-06-28 RX ORDER — LIDOCAINE HYDROCHLORIDE 40 MG/ML
SOLUTION TOPICAL ONCE
Status: CANCELLED | OUTPATIENT
Start: 2021-06-28 | End: 2021-06-28

## 2021-06-28 RX ORDER — GENTAMICIN SULFATE 1 MG/G
OINTMENT TOPICAL ONCE
Status: CANCELLED | OUTPATIENT
Start: 2021-06-28 | End: 2021-06-28

## 2021-06-28 RX ORDER — GINSENG 100 MG
CAPSULE ORAL ONCE
Status: CANCELLED | OUTPATIENT
Start: 2021-06-28 | End: 2021-06-28

## 2021-06-28 RX ORDER — LIDOCAINE HYDROCHLORIDE 40 MG/ML
SOLUTION TOPICAL ONCE
Status: COMPLETED | OUTPATIENT
Start: 2021-06-28 | End: 2021-06-28

## 2021-06-28 RX ADMIN — LIDOCAINE HYDROCHLORIDE: 40 SOLUTION TOPICAL at 09:50

## 2021-06-28 ASSESSMENT — PAIN DESCRIPTION - PROGRESSION: CLINICAL_PROGRESSION: NOT CHANGED

## 2021-06-28 ASSESSMENT — PAIN DESCRIPTION - LOCATION: LOCATION: LEG

## 2021-06-28 ASSESSMENT — PAIN DESCRIPTION - DESCRIPTORS: DESCRIPTORS: BURNING

## 2021-06-28 ASSESSMENT — PAIN DESCRIPTION - ONSET: ONSET: ON-GOING

## 2021-06-28 ASSESSMENT — PAIN DESCRIPTION - FREQUENCY: FREQUENCY: CONTINUOUS

## 2021-06-28 ASSESSMENT — PAIN DESCRIPTION - ORIENTATION: ORIENTATION: RIGHT

## 2021-06-28 NOTE — PROGRESS NOTES
Multilayer Compression Wrap   (Not Unna) Below the Knee    NAME:  Gayle Whitfield  YOB: 1965  MEDICAL RECORD NUMBER:  [id number]  DATE:  6/28/2021        Removed old Multilayer wrap if present and washed leg with mild soap/water.   Applied moisturizing agent to dry skin as needed.   Applied primary and secondary dressing as ordered     Applied multilayered dressing below the knee to Right lower leg(s)  (4 Layer Compression Wrap ) .   Instructed patient/caregiver not to remove dressing and to keep it clean and dry.   Instructed patient/caregiver on complications to report to provider, such as pain, numbness in toes, heavy drainage, and slippage of dressing.   Instructed patient on purpose of compression dressing and on activity and exercise recommendations.     Applied per   Guidelines    Electronically signed by Sadie Mckinley RN on 6/28/2021 at 10:24 AM

## 2021-06-28 NOTE — PROGRESS NOTES
[facility]  Progress Note and Procedure Note      Dorothy Winslow  MEDICAL RECORD NUMBER:  [id number]  AGE: 64 y.o. GENDER: male  : 1965  EPISODE DATE:  2021    Subjective:     Chief Complaint   Patient presents with    Wound Check     right lower leg         HISTORY of PRESENT ILLNESS HPI     Dorothy Winslow is a 64 y.o. male who presents today for wound/ulcer evaluation. History of Wound Context: Continues follow-up for his chronic venous insufficiency with open wounds and lymphedema. He continues on the oral antibiotics as prescribed by the infectious disease doctor. He states there is less pain and drainage from the right lower extremity    Pain Assessment:  Wound/Ulcer Pain Timing/Severity: none  Quality of pain: N/A  Severity:  0 / 10   Modifying Factors: None  Associated Signs/Symptoms: edema, drainage and odor    Ulcer Identification:  Ulcer Type: venous  Contributing Factors: edema, venous stasis, lymphedema, obesity and non-adherence    Objective:      BP (!) 148/69   Pulse 75   Temp 97.1 °F (36.2 °C) (Temporal)   Resp 16     Wt Readings from Last 3 Encounters:   21 243 lb (110.2 kg)   21 236 lb 12.4 oz (107.4 kg)   20 247 lb 12.8 oz (112.4 kg)       PHYSICAL EXAM    Extremities: no cyanosis, no clubbing, 3+ nonpitting edema-  right lower extremity with multiple full-thickness wounds containing fibrin and slough right medial lateral knee, evidence of epithelialization at margins of the wound    Assessment:      1. Noncompliance    2. Chronic acquired lymphedema    3. Idiopathic chronic venous hypertension of right lower extremity with ulcer ([address]         Procedure Note  Indications:  Based on my examination of this patient's wound(s) today, sharp excision is required to promote healing and evaluate the extent healing. Performed by: Dragan Jasmine MD    Consent obtained?  Yes    Time out taken: Yes    Pain Control: Anesthetic: None Debridement:Excisional Debridement    Using curette the wound was sharply debrided    down through and including the removal of  epidermis, dermis and subcutaneous tissue. Devitalized Tissue Debrided:  fibrin, biofilm and slough      Pre Debridement Measurements:  Are located in the Wound Documentation Flow Sheet   Wound #: 1, 2 and 3     Post  Debridement Measurements:  Wound 05/03/21 Leg Right;Medial;Lower #1 (Active)   Wound Image   05/14/21 1032   Wound Etiology Venous 05/03/21 1120   Wound Cleansed Cleansed with saline 06/28/21 0941   Dressing/Treatment Alginate with Ag 06/28/21 1013   Wound Length (cm) 5.1 cm 06/28/21 0941   Wound Width (cm) 4.7 cm 06/28/21 0941   Wound Depth (cm) 0.1 cm 06/28/21 0941   Wound Surface Area (cm^2) 23.97 cm^2 06/28/21 0941   Change in Wound Size % (l*w) 34.95 06/28/21 0941   Wound Volume (cm^3) 2.397 cm^3 06/28/21 0941   Wound Healing % 35 06/28/21 0941   Post-Procedure Length (cm) 5.2 cm 06/28/21 1013   Post-Procedure Width (cm) 4.8 cm 06/28/21 1013   Post-Procedure Depth (cm) 0.3 cm 06/28/21 1013   Post-Procedure Surface Area (cm^2) 24.96 cm^2 06/28/21 1013   Post-Procedure Volume (cm^3) 7.488 cm^3 06/28/21 1013   Distance Tunneling (cm) 0 cm 06/28/21 0941   Undermining Maxium Distance (cm) 0 06/28/21 0941   Wound Assessment Pink/red;Fibrin 06/28/21 0941   Drainage Amount Moderate 06/28/21 0941   Drainage Description Yellow;Brown 06/28/21 0941   Odor Mild 06/28/21 0941   Kiah-wound Assessment Intact 06/28/21 0941   Margins Defined edges 06/28/21 0941   Wound Thickness Description not for Pressure Injury Full thickness 06/28/21 0941   Number of days: 55       Wound 05/03/21 Leg Anterior; Lower;Right #2 cluster (Active)   Wound Image   05/14/21 1032   Wound Etiology Venous 05/03/21 1120   Wound Cleansed Cleansed with saline 06/28/21 0941   Dressing/Treatment Alginate with Ag 06/28/21 1013   Wound Length (cm) 5 cm 06/28/21 0941   Wound Width (cm) 9 cm 06/28/21 0941   Wound Depth (cm) 0.1 cm 06/28/21 0941   Wound Surface Area (cm^2) 45 cm^2 06/28/21 0941   Change in Wound Size % (l*w) 74.08 06/28/21 0941   Wound Volume (cm^3) 4.5 cm^3 06/28/21 0941   Wound Healing % 74 06/28/21 0941   Post-Procedure Length (cm) 5.1 cm 06/28/21 1013   Post-Procedure Width (cm) 9.1 cm 06/28/21 1013   Post-Procedure Depth (cm) 0.3 cm 06/28/21 1013   Post-Procedure Surface Area (cm^2) 46.41 cm^2 06/28/21 1013   Post-Procedure Volume (cm^3) 13.923 cm^3 06/28/21 1013   Distance Tunneling (cm) 0 cm 06/28/21 0941   Undermining Maxium Distance (cm) 0 06/28/21 0941   Wound Assessment Pink/red;Slough 06/28/21 0941   Drainage Amount Moderate 06/28/21 0941   Drainage Description Yellow;Brown 06/28/21 0941   Odor Mild 06/28/21 0941   Kiah-wound Assessment Intact 06/28/21 0941   Margins Undefined edges 06/28/21 0941   Wound Thickness Description not for Pressure Injury Full thickness 06/28/21 0941   Number of days: 55       Wound 05/03/21 Leg Lower;Right;Posterior #3 (Active)   Wound Image   05/14/21 1032   Wound Etiology Venous 05/03/21 1120   Wound Cleansed Cleansed with saline 06/25/21 1123   Dressing/Treatment Alginate with Ag 06/28/21 1013   Wound Length (cm) 3.8 cm 06/28/21 0941   Wound Width (cm) 4.5 cm 06/28/21 0941   Wound Depth (cm) 0.1 cm 06/28/21 0941   Wound Surface Area (cm^2) 17.1 cm^2 06/28/21 0941   Change in Wound Size % (l*w) 47.38 06/28/21 0941   Wound Volume (cm^3) 1.71 cm^3 06/28/21 0941   Wound Healing % 47 06/28/21 0941   Post-Procedure Length (cm) 3.9 cm 06/28/21 1013   Post-Procedure Width (cm) 4.6 cm 06/28/21 1013   Post-Procedure Depth (cm) 0.3 cm 06/28/21 1013   Post-Procedure Surface Area (cm^2) 17.94 cm^2 06/28/21 1013   Post-Procedure Volume (cm^3) 5.382 cm^3 06/28/21 1013   Distance Tunneling (cm) 0 cm 06/28/21 0941   Undermining Maxium Distance (cm) 0 06/28/21 0941   Wound Assessment Pink/red;Slough 06/28/21 0941   Drainage Amount Moderate 06/28/21 0941   Drainage Description Yellow;Brown 06/28/21 0941   Odor Mild 06/28/21 0941   Kiah-wound Assessment Intact 06/28/21 0941   Margins Defined edges 06/28/21 0941   Wound Thickness Description not for Pressure Injury Full thickness 06/28/21 0941   Number of days: 55          Percent of Wound Debrided: 100%    Total Surface Area Debrided:  89 sq cm    Diabetic/Pressure/Non Pressure Ulcers only:  Ulcer: Non-Pressure ulcer, fat layer exposed    Bleeding: Minimal    Hemostasis Achieved: by pressure    Procedural Pain: 0  / 10     Post Procedural Pain: 0 / 10     Response to treatment:  Well tolerated by patient. Wounds appear much . Patient continues with lymphedema pumps we will wrap in compression once again today. Plan:     Treatment Note: Please see attached Discharge Instructions. These instructions were given and signed by the patient or POA    New Medication(s) at this visit:   New Prescriptions    No medications on file       Other orders at this visit:   Orders Placed This Encounter   Procedures   [id number] [address] Protocol       Discharge Instructions          [address] [facility] Physician Orders and Discharge Instructions  [address] [address] [address] [place] [address]. [facility]. [facility]  Telephone: [phone] hands with soap and water prior to and after every dressing change. Wound Cleansing: (All wounds are cleaned with 0.9% saline during your wound care visit)   · Do not scrub or use excessive force. · With each dressing change, rinse wounds with 0.9% Saline. (May use wound wash or soft contact solution. Both can be purchased at a local drug store). · If unable to obtain saline, may use a gentle soap and water. · Keep wounds dry in the shower unless otherwise instructed by the physician. · For wounds on lower legs, cast covers can be purchased at local drug stores, so that you may shower and keep the wound(s) dry.     [] Instructions for [facility] solution ONLY: Apply enough Vashe to soak a piece of gauze and place on wound bed for 5-10 minutes. DO NOT rinse after Vashe has been applied. Follow dressing application as instructed below. [x] Vashe Wash applied during clinic visit. Kiah wound Topical Treatments:  Do not apply lotions, creams, or ointments to the skin around the wound bed unless directed as followed:     [] Apply around the wound: [] moisturizing lotion [] Antifungal ointment [] No-Sting barrier film [] Zinc paste [] Other:       Dressings:           Wound Location: right lower leg      Apply Primary Dressing to wound:       Alginate with silver pad     Cover and Secure with:     Cover and Secure with: Extra absorbant pad   Avoid contact of tape with skin if possible.  When to change Dressing: [] Daily [] Every Other Day [] Once a week  [] Three times per week: [] Monday, Wednesday, Friday [] Tuesday, Thursday, Saturday  [x] Do Not Change Dressing [] Other:        Multilayer Compression Wrap:    Type: 4 Layer Compression Wrap   · Applied in Clinic to the :  Right lower leg(s) on 6/28/2021  · Do not get leg(s) with wrap wet. · If wraps become too tight call the center or completely remove the wrap. · Elevate leg(s) above the level of the heart when sitting. · Avoid prolonged standing in one place. Lymphedema Therapy:  [x] Wear Lymphedema pumps twice a day at settings prescribed by your physician. [x]Elevate leg(s) above the level of the heart for 30 minutes 4-5 times a day and/or when sitting. [x] Avoid prolonged standing in one place. Dietary:  Important dietary reminders:  1. Increase Protein intake (i.e. Lean meats, fish, eggs, legumes, and yogurt)  2. No added salt  3. If diabetic, follow a diabetic diet and check glucose prior to meals or as instructed by your physician. If you are still having pain after you go home:   For wounds on lower legs or arms, elevate the affected limb.    Use over-the-counter medications you would normally use for pain as permitted by your primary care doctor.  For persistent pain not relieved by the above interventions, please call your primary care doctor. Return Appointment:   Return Appointment: With Dr. Lolis Agustin  in  [facility])  o Scheduled weekly until (Date):      [x] Return Appointment for a Wound Assessment with a nurse on: 07/02/21    []DME/Wound Dressing Supplies ordered at this visit: []Yes []No  o Supplies Provided by:   o Please call them directly to reorder supplies when you run out.  o CONTINUE TO USE THE SUPPLIES YOU HAVE AVAILABLE. IT IS MOST IMPORTANT TO KEEP THE WOUND COVERED AT ALL TIMES. [x] Orders placed during your visit:   Orders Placed This Encounter   Procedures    Initiate Outpatient Wound Care Protocol       Your nurse  is:  Beatrice Yeager     Electronically signed by Mariaelena Méndez RN on 6/28/2021 at 10:15 R Aileen Cardoso 8 Information: Should you experience any significant changes in your wound(s) or have questions about your wound care, please contact the [address] [place] [address] at [phone]. We are open from 8:00am - 4:30p Monday thru Friday except for Wednesdays which we are closed. Please give us 24-48 hours to return your call. Call your doctor now or seek immediate medical care if:    · You have symptoms of infection, such as:  ? Increased pain, swelling, warmth, or redness. ? Red streaks leading from the area. ? Pus draining from the area. ? A fever.         Physician for this visit and orders: Fernanda Chowdhury MD    [] Patient unable to sign Discharge Instructions given to ECF/Transportation/POA        Electronically signed by Fernanda Chowdhury MD on 6/28/2021 at 10:59 AM

## 2021-06-28 NOTE — PROGRESS NOTES
Surface Area (cm^2) 24.96 cm^2 06/28/21 1013   Post-Procedure Volume (cm^3) 7.488 cm^3 06/28/21 1013   Distance Tunneling (cm) 0 cm 06/28/21 0941   Undermining Maxium Distance (cm) 0 06/28/21 0941   Wound Assessment Pink/red;Fibrin 06/28/21 0941   Drainage Amount Moderate 06/28/21 0941   Drainage Description Yellow;Brown 06/28/21 0941   Odor Mild 06/28/21 0941   Kiah-wound Assessment Intact 06/28/21 0941   Margins Defined edges 06/28/21 0941   Wound Thickness Description not for Pressure Injury Full thickness 06/28/21 0941   Number of days: 56       Wound 05/03/21 Leg Anterior; Lower;Right #2 cluster (Active)   Wound Image   05/14/21 1032   Wound Etiology Venous 05/03/21 1120   Wound Cleansed Cleansed with saline 06/28/21 0941   Dressing/Treatment Alginate with Ag 06/28/21 1013   Wound Length (cm) 5 cm 06/28/21 0941   Wound Width (cm) 9 cm 06/28/21 0941   Wound Depth (cm) 0.1 cm 06/28/21 0941   Wound Surface Area (cm^2) 45 cm^2 06/28/21 0941   Change in Wound Size % (l*w) 74.08 06/28/21 0941   Wound Volume (cm^3) 4.5 cm^3 06/28/21 0941   Wound Healing % 74 06/28/21 0941   Post-Procedure Length (cm) 5.1 cm 06/28/21 1013   Post-Procedure Width (cm) 9.1 cm 06/28/21 1013   Post-Procedure Depth (cm) 0.3 cm 06/28/21 1013   Post-Procedure Surface Area (cm^2) 46.41 cm^2 06/28/21 1013   Post-Procedure Volume (cm^3) 13.923 cm^3 06/28/21 1013   Distance Tunneling (cm) 0 cm 06/28/21 0941   Undermining Maxium Distance (cm) 0 06/28/21 0941   Wound Assessment Pink/red;Slough 06/28/21 0941   Drainage Amount Moderate 06/28/21 0941   Drainage Description Yellow;Brown 06/28/21 0941   Odor Mild 06/28/21 0941   Kiah-wound Assessment Intact 06/28/21 0941   Margins Undefined edges 06/28/21 0941   Wound Thickness Description not for Pressure Injury Full thickness 06/28/21 0941   Number of days: 56       Wound 05/03/21 Leg Lower;Right;Posterior #3 (Active)   Wound Image   05/14/21 1032   Wound Etiology Venous 05/03/21 1120   Wound Cleansed Excisional/Sharp    Patient currently being seen by Home Health: [] Yes   [x] No      Compression Type:   Wide band with silicone gripper pull up compression stockings, 30-40mmHG to bilateral lower extremities    Provider Information:      [facility] NAME/NPI:Khang Martinez MD,  NPI: [id number]

## 2021-07-02 ENCOUNTER — HOSPITAL ENCOUNTER (OUTPATIENT)
Dept: WOUND CARE | Age: 56
Discharge: HOME | End: 2021-07-02
Payer: COMMERCIAL

## 2021-07-02 VITALS
SYSTOLIC BLOOD PRESSURE: 155 MMHG | TEMPERATURE: 97.3 F | HEART RATE: 76 BPM | RESPIRATION RATE: 16 BRPM | DIASTOLIC BLOOD PRESSURE: 82 MMHG

## 2021-07-02 DIAGNOSIS — I87.311 IDIOPATHIC CHRONIC VENOUS HYPERTENSION OF RIGHT LOWER EXTREMITY WITH ULCER (HCC): ICD-10-CM

## 2021-07-02 DIAGNOSIS — I89.0 CHRONIC ACQUIRED LYMPHEDEMA: ICD-10-CM

## 2021-07-02 DIAGNOSIS — Z91.199 NONCOMPLIANCE: ICD-10-CM

## 2021-07-02 DIAGNOSIS — L97.919 IDIOPATHIC CHRONIC VENOUS HYPERTENSION OF RIGHT LOWER EXTREMITY WITH ULCER (HCC): ICD-10-CM

## 2021-07-02 PROCEDURE — 29581 APPL MULTLAYER CMPRN SYS LEG: CPT

## 2021-07-02 ASSESSMENT — PAIN SCALES - GENERAL: PAINLEVEL_OUTOF10: 2

## 2021-07-02 ASSESSMENT — PAIN DESCRIPTION - DESCRIPTORS: DESCRIPTORS: BURNING

## 2021-07-02 ASSESSMENT — PAIN DESCRIPTION - PAIN TYPE: TYPE: CHRONIC PAIN

## 2021-07-02 ASSESSMENT — PAIN DESCRIPTION - ORIENTATION: ORIENTATION: RIGHT

## 2021-07-02 ASSESSMENT — PAIN DESCRIPTION - LOCATION: LOCATION: LEG

## 2021-07-02 NOTE — PROGRESS NOTES
Multilayer Compression Wrap   (Not Unna) Below the Knee    NAME:  Briana Márquez  YOB: 1965  MEDICAL RECORD NUMBER:  [id number]  DATE:  7/2/2021        Removed old Multilayer wrap if present and washed leg with mild soap/water.   Applied moisturizing agent to dry skin as needed.   Applied primary and secondary dressing as ordered     Applied multilayered dressing below the knee to Right lower leg(s)  (4 Layer Compression Wrap ) .   Instructed patient/caregiver not to remove dressing and to keep it clean and dry.   Instructed patient/caregiver on complications to report to provider, such as pain, numbness in toes, heavy drainage, and slippage of dressing.   Instructed patient on purpose of compression dressing and on activity and exercise recommendations.     Applied per   Guidelines    Electronically signed by Kenroy Gilbert RN on 7/2/2021 at 8:57 AM

## 2021-07-06 ENCOUNTER — HOSPITAL ENCOUNTER (OUTPATIENT)
Dept: WOUND CARE | Age: 56
Discharge: HOME | End: 2021-07-06
Payer: COMMERCIAL

## 2021-07-06 VITALS
DIASTOLIC BLOOD PRESSURE: 83 MMHG | SYSTOLIC BLOOD PRESSURE: 150 MMHG | RESPIRATION RATE: 18 BRPM | HEART RATE: 89 BPM | TEMPERATURE: 96.9 F

## 2021-07-06 DIAGNOSIS — L97.919 IDIOPATHIC CHRONIC VENOUS HYPERTENSION OF RIGHT LOWER EXTREMITY WITH ULCER (HCC): ICD-10-CM

## 2021-07-06 DIAGNOSIS — I89.0 CHRONIC ACQUIRED LYMPHEDEMA: ICD-10-CM

## 2021-07-06 DIAGNOSIS — I87.311 IDIOPATHIC CHRONIC VENOUS HYPERTENSION OF RIGHT LOWER EXTREMITY WITH ULCER (HCC): ICD-10-CM

## 2021-07-06 DIAGNOSIS — Z91.199 NONCOMPLIANCE: Primary | ICD-10-CM

## 2021-07-06 PROCEDURE — 11045 DBRDMT SUBQ TISS EACH ADDL: CPT

## 2021-07-06 PROCEDURE — 11042 DBRDMT SUBQ TIS 1ST 20SQCM/<: CPT | Performed by: SPECIALIST

## 2021-07-06 PROCEDURE — 11042 DBRDMT SUBQ TIS 1ST 20SQCM/<: CPT

## 2021-07-06 PROCEDURE — 11045 DBRDMT SUBQ TISS EACH ADDL: CPT | Performed by: SPECIALIST

## 2021-07-06 PROCEDURE — 29581 APPL MULTLAYER CMPRN SYS LEG: CPT

## 2021-07-06 PROCEDURE — 6370000000 HC RX 637 (ALT 250 FOR IP): Performed by: SPECIALIST

## 2021-07-06 RX ORDER — LIDOCAINE HYDROCHLORIDE 40 MG/ML
SOLUTION TOPICAL ONCE
Status: CANCELLED | OUTPATIENT
Start: 2021-07-06 | End: 2021-07-06

## 2021-07-06 RX ORDER — BACITRACIN ZINC AND POLYMYXIN B SULFATE 500; 1000 [USP'U]/G; [USP'U]/G
OINTMENT TOPICAL ONCE
Status: CANCELLED | OUTPATIENT
Start: 2021-07-06 | End: 2021-07-06

## 2021-07-06 RX ORDER — LIDOCAINE 40 MG/G
CREAM TOPICAL ONCE
Status: CANCELLED | OUTPATIENT
Start: 2021-07-06 | End: 2021-07-06

## 2021-07-06 RX ORDER — CLOBETASOL PROPIONATE 0.5 MG/G
OINTMENT TOPICAL ONCE
Status: CANCELLED | OUTPATIENT
Start: 2021-07-06 | End: 2021-07-06

## 2021-07-06 RX ORDER — BACITRACIN, NEOMYCIN, POLYMYXIN B 400; 3.5; 5 [USP'U]/G; MG/G; [USP'U]/G
OINTMENT TOPICAL ONCE
Status: CANCELLED | OUTPATIENT
Start: 2021-07-06 | End: 2021-07-06

## 2021-07-06 RX ORDER — GENTAMICIN SULFATE 1 MG/G
OINTMENT TOPICAL ONCE
Status: CANCELLED | OUTPATIENT
Start: 2021-07-06 | End: 2021-07-06

## 2021-07-06 RX ORDER — LIDOCAINE HYDROCHLORIDE 40 MG/ML
SOLUTION TOPICAL ONCE
Status: COMPLETED | OUTPATIENT
Start: 2021-07-06 | End: 2021-07-06

## 2021-07-06 RX ORDER — LIDOCAINE 50 MG/G
OINTMENT TOPICAL ONCE
Status: CANCELLED | OUTPATIENT
Start: 2021-07-06 | End: 2021-07-06

## 2021-07-06 RX ORDER — LIDOCAINE HYDROCHLORIDE 20 MG/ML
JELLY TOPICAL ONCE
Status: CANCELLED | OUTPATIENT
Start: 2021-07-06 | End: 2021-07-06

## 2021-07-06 RX ORDER — GINSENG 100 MG
CAPSULE ORAL ONCE
Status: CANCELLED | OUTPATIENT
Start: 2021-07-06 | End: 2021-07-06

## 2021-07-06 RX ORDER — BETAMETHASONE DIPROPIONATE 0.05 %
OINTMENT (GRAM) TOPICAL ONCE
Status: CANCELLED | OUTPATIENT
Start: 2021-07-06 | End: 2021-07-06

## 2021-07-06 RX ADMIN — LIDOCAINE HYDROCHLORIDE: 40 SOLUTION TOPICAL at 09:58

## 2021-07-06 ASSESSMENT — PAIN DESCRIPTION - ONSET: ONSET: ON-GOING

## 2021-07-06 ASSESSMENT — PAIN SCALES - GENERAL: PAINLEVEL_OUTOF10: 2

## 2021-07-06 ASSESSMENT — PAIN DESCRIPTION - LOCATION: LOCATION: LEG

## 2021-07-06 ASSESSMENT — PAIN DESCRIPTION - DESCRIPTORS: DESCRIPTORS: BURNING

## 2021-07-06 ASSESSMENT — PAIN DESCRIPTION - ORIENTATION: ORIENTATION: RIGHT

## 2021-07-06 ASSESSMENT — PAIN DESCRIPTION - FREQUENCY: FREQUENCY: CONTINUOUS

## 2021-07-06 ASSESSMENT — PAIN DESCRIPTION - PAIN TYPE: TYPE: CHRONIC PAIN

## 2021-07-06 ASSESSMENT — PAIN DESCRIPTION - PROGRESSION: CLINICAL_PROGRESSION: NOT CHANGED

## 2021-07-06 NOTE — PLAN OF CARE
Multilayer Compression Wrap   (Not Unna) Below the Knee    NAME:  Grant Hamilton  YOB: 1965  MEDICAL RECORD NUMBER:  [id number]  DATE:  7/6/2021        Removed old Multilayer wrap if present and washed leg with mild soap/water.   Applied moisturizing agent to dry skin as needed.   Applied primary and secondary dressing as ordered     Applied multilayered dressing below the knee to Right lower leg(s)  (4 Layer Compression Wrap ) .   Instructed patient/caregiver not to remove dressing and to keep it clean and dry.   Instructed patient/caregiver on complications to report to provider, such as pain, numbness in toes, heavy drainage, and slippage of dressing.   Instructed patient on purpose of compression dressing and on activity and exercise recommendations.     Applied per   Guidelines    Electronically signed by Cedric Márquez RN on 7/6/2021 at 10:37 AM

## 2021-07-06 NOTE — PROGRESS NOTES
[facility] [place]      Leslie Khan  MEDICAL RECORD NUMBER:  [id number]  AGE: 64 y.o. GENDER: male  : 1965  EPISODE DATE:  2021    Subjective:     Chief Complaint   Patient presents with    Wound Check     right lower leg         HISTORY of PRESENT ILLNESS HPI     Leslie Khan is a 64 y.o. male who presents today for wound/ulcer evaluation. History of Wound Context: Continues follow-up for his chronic venous insufficiency with open wounds and lymphedema. He continues on the oral antibiotics as prescribed by the infectious disease doctor. He states there is less pain and drainage from the right lower extremity. He claims to be using his lymphedema pumps    Pain Assessment:  Wound/Ulcer Pain Timing/Severity: none  Quality of pain: N/A  Severity:  0 / 10   Modifying Factors: None  Associated Signs/Symptoms: edema, drainage and odor    Ulcer Identification:  Ulcer Type: venous  Contributing Factors: edema, venous stasis, lymphedema, obesity and non-adherence    Objective:      BP (!) 150/83   Pulse 89   Temp 96.9 °F (36.1 °C) (Temporal)   Resp 18     Wt Readings from Last 3 Encounters:   21 243 lb (110.2 kg)   21 236 lb 12.4 oz (107.4 kg)   20 247 lb 12.8 oz (112.4 kg)       PHYSICAL EXAM    Extremities: no cyanosis, no clubbing, 3 + nonpitting edema-  right lower extremity with 3 separate full-thickness wounds on the right medial, anterior and posterior knee the wound contains fibrin and slough with granulation tissue and evidence of epithelialization at the margins of the wound. Periwound is dry and scaly                Assessment:      1. Noncompliance    2. Chronic acquired lymphedema    3.  Idiopathic chronic venous hypertension of right lower extremity with ulcer ([facility] [address]         Procedure Note  Indications:  Based on my examination of this patient's wound(s) today, sharp excision is required to promote healing and evaluate the extent healing. Performed by: Daisy Gonzalez MD    Consent obtained? Yes    Time out taken: Yes    Pain Control: Anesthetic: 4% Lidocaine Liquid Topical     Debridement:Excisional Debridement    Using curette the wound was sharply debrided    down through and including the removal of  epidermis, dermis and subcutaneous tissue. Devitalized Tissue Debrided:  fibrin, biofilm and slough      Pre Debridement Measurements:  Are located in the Wound Documentation Flow Sheet   Wound #: 1, 2 and 3     Post  Debridement Measurements:  Wound 05/03/21 Leg Right;Medial;Lower #1 (Active)   Wound Image   07/06/21 0943   Wound Etiology Venous 05/03/21 1120   Wound Cleansed Cleansed with saline 07/06/21 0943   Dressing/Treatment Alginate with Ag 07/02/21 0839   Wound Length (cm) 4 cm 07/06/21 0943   Wound Width (cm) 4.5 cm 07/06/21 0943   Wound Depth (cm) 0.1 cm 07/06/21 0943   Wound Surface Area (cm^2) 18 cm^2 07/06/21 0943   Change in Wound Size % (l*w) 51.15 07/06/21 0943   Wound Volume (cm^3) 1.8 cm^3 07/06/21 0943   Wound Healing % 51 07/06/21 0943   Post-Procedure Length (cm) 5.2 cm 06/28/21 1013   Post-Procedure Width (cm) 4.8 cm 06/28/21 1013   Post-Procedure Depth (cm) 0.3 cm 06/28/21 1013   Post-Procedure Surface Area (cm^2) 24.96 cm^2 06/28/21 1013   Post-Procedure Volume (cm^3) 7.488 cm^3 06/28/21 1013   Distance Tunneling (cm) 0 cm 07/06/21 0943   Undermining Maxium Distance (cm) 0 07/06/21 0943   Wound Assessment Pink/red;Fibrin 07/06/21 0943   Drainage Amount Moderate 07/06/21 0943   Drainage Description Brown;Yellow;Purulent;Serosanguinous 07/06/21 0943   Odor Mild 07/06/21 0943   Kiah-wound Assessment Intact 07/06/21 0943   Margins Defined edges 07/06/21 0943   Wound Thickness Description not for Pressure Injury Full thickness 07/06/21 0943   Number of days: 63       Wound 05/03/21 Leg Anterior; Lower;Right #2 cluster (Active)   Wound Image   07/06/21 0943   Wound Etiology Venous 05/03/21 1120   Wound Cleansed Cleansed with saline 07/06/21 0943   Dressing/Treatment Alginate with Ag 07/02/21 0839   Wound Length (cm) 4 cm 07/06/21 0943   Wound Width (cm) 8.6 cm 07/06/21 0943   Wound Depth (cm) 0.1 cm 07/06/21 0943   Wound Surface Area (cm^2) 34.4 cm^2 07/06/21 0943   Change in Wound Size % (l*w) 80.18 07/06/21 0943   Wound Volume (cm^3) 3.44 cm^3 07/06/21 0943   Wound Healing % 80 07/06/21 0943   Post-Procedure Length (cm) 4.1 cm 07/06/21 1008   Post-Procedure Width (cm) 8.7 cm 07/06/21 1008   Post-Procedure Depth (cm) 0.3 cm 07/06/21 1008   Post-Procedure Surface Area (cm^2) 35.67 cm^2 07/06/21 1008   Post-Procedure Volume (cm^3) 10.701 cm^3 07/06/21 1008   Distance Tunneling (cm) 0 cm 07/06/21 0943   Undermining Maxium Distance (cm) 0 07/06/21 0943   Wound Assessment Pink/red;Slough 07/06/21 0943   Drainage Amount Moderate 07/06/21 0943   Drainage Description Yellow;Brown;Purulent 07/06/21 0943   Odor Mild 07/06/21 0943   Kiah-wound Assessment Intact 07/06/21 0943   Margins Undefined edges 07/06/21 0943   Wound Thickness Description not for Pressure Injury Full thickness 07/06/21 0943   Number of days: 63       Wound 05/03/21 Leg Lower;Right;Posterior #3 (Active)   Wound Image   07/06/21 0943   Wound Etiology Venous 05/03/21 1120   Wound Cleansed Cleansed with saline 07/06/21 0943   Dressing/Treatment Alginate with Ag 07/02/21 0839   Wound Length (cm) 3 cm 07/06/21 0943   Wound Width (cm) 4.5 cm 07/06/21 0943   Wound Depth (cm) 0.1 cm 07/06/21 0943   Wound Surface Area (cm^2) 13.5 cm^2 07/06/21 0943   Change in Wound Size % (l*w) 58.46 07/06/21 0943   Wound Volume (cm^3) 1.35 cm^3 07/06/21 0943   Wound Healing % 58 07/06/21 0943   Post-Procedure Length (cm) 3.1 cm 07/06/21 1008   Post-Procedure Width (cm) 4.6 cm 07/06/21 1008   Post-Procedure Depth (cm) 0.3 cm 07/06/21 1008   Post-Procedure Surface Area (cm^2) 14.26 cm^2 07/06/21 1008   Post-Procedure Volume (cm^3) 4.278 cm^3 07/06/21 1008   Distance Tunneling (cm) 0 cm 07/06/21 0943   Undermining Maxium Distance (cm) 0 07/06/21 0943   Wound Assessment Pink/red;Fibrin 07/06/21 0943   Drainage Amount Moderate 07/06/21 0943   Drainage Description Yellow;Brown;Purulent 07/06/21 0943   Odor Mild 07/06/21 0943   Kiah-wound Assessment Intact 07/06/21 0943   Margins Defined edges 07/06/21 0943   Wound Thickness Description not for Pressure Injury Full thickness 07/06/21 0943   Number of days: 63          Percent of Wound Debrided: 100%    Total Surface Area Debrided:  73.7 sq cm    Diabetic/Pressure/Non Pressure Ulcers only:  Ulcer: Non-Pressure ulcer, fat layer exposed    Bleeding: Minimal    Hemostasis Achieved: by pressure    Procedural Pain: 0  / 10     Post Procedural Pain: 0 / 10     Response to treatment:  Well tolerated by patient. Wounds continue to slowly epithelialize. Plan:     Treatment Note: Please see attached Discharge Instructions. These instructions were given and signed by the patient or POA    New Medication(s) at this visit:   New Prescriptions    No medications on file       Other orders at this visit:   Orders Placed This Encounter   Procedures   [address] Protocol       Discharge Instructions          [facility] Physician Orders and Discharge Instructions  [address] [address] [address] [address] [facility]  Telephone: [phone] hands with soap and water prior to and after every dressing change. Wound Cleansing: (All wounds are cleaned with 0.9% saline during your wound care visit)   · Do not scrub or use excessive force. · With each dressing change, rinse wounds with 0.9% Saline. (May use wound wash or soft contact solution. Both can be purchased at a local drug store). · If unable to obtain saline, may use a gentle soap and water. · Keep wounds dry in the shower unless otherwise instructed by the physician.   · For wounds on lower legs, cast covers can be purchased at local drug stores, so that you may shower and keep the wound(s) dry. [] Instructions for Vashe Wash solution ONLY: Apply enough Vashe to soak a piece of gauze and place on wound bed for 5-10 minutes. DO NOT rinse after Vashe has been applied. Follow dressing application as instructed below. [x] Vashe Wash applied during clinic visit. Kiah wound Topical Treatments:  Do not apply lotions, creams, or ointments to the skin around the wound bed unless directed as followed:     [] Apply around the wound: [] moisturizing lotion [] Antifungal ointment [] No-Sting barrier film [] Zinc paste [] Other:       Dressings:           Wound Location: right lower leg wounds    Apply Primary Dressing to wound:       Alginate with silver pad     Cover and Secure with:     Cover and Secure with: Extra absorbant pad   Avoid contact of tape with skin if possible.  When to change Dressing: [] Daily [] Every Other Day [] Once a week  [] Three times per week: [] Monday, Wednesday, Friday [] Tuesday, Thursday, Saturday  [x] Do Not Change Dressing [] Other:     Edema Control:  Apply: [x] Compression Stocking  [x] Left Lower Leg [] Right Lower Leg        Apply every morning immediately when getting up. They should be applied to affected leg(s) from mid foot to knee making sure to cover the heel. Remove every night before going to bed. [x] Elevate leg(s) above the level of the heart for 30 minutes 4-5 times a day and/or when sitting. [x] Avoid prolonged standing in one place. Multilayer Compression Wrap:    Type: 4 Layer Compression Wrap   · Applied in Clinic to the :  Right lower leg(s) on 7/6/2021  · Do not get leg(s) with wrap wet. · If wraps become too tight call the center or completely remove the wrap. · Elevate leg(s) above the level of the heart when sitting. · Avoid prolonged standing in one place.      Lymphedema Therapy:  [x] Wear Lymphedema pumps twice a day at settings prescribed redness. ? Red streaks leading from the area. ? Pus draining from the area. ? A fever.         Physician for this visit and orders: Margot Landrum MD    [] Patient unable to sign Discharge Instructions given to ECF/Transportation/POA        Electronically signed by Margot Landrum MD on 7/6/2021 at 10:31 AM

## 2021-07-12 ENCOUNTER — HOSPITAL ENCOUNTER (OUTPATIENT)
Dept: WOUND CARE | Age: 56
Discharge: HOME | End: 2021-07-12
Payer: COMMERCIAL

## 2021-07-12 VITALS
TEMPERATURE: 97.3 F | SYSTOLIC BLOOD PRESSURE: 134 MMHG | RESPIRATION RATE: 18 BRPM | DIASTOLIC BLOOD PRESSURE: 80 MMHG | HEART RATE: 69 BPM

## 2021-07-12 DIAGNOSIS — Z91.199 NONCOMPLIANCE: Primary | ICD-10-CM

## 2021-07-12 DIAGNOSIS — I89.0 CHRONIC ACQUIRED LYMPHEDEMA: ICD-10-CM

## 2021-07-12 DIAGNOSIS — I87.311 IDIOPATHIC CHRONIC VENOUS HYPERTENSION OF RIGHT LOWER EXTREMITY WITH ULCER (HCC): ICD-10-CM

## 2021-07-12 DIAGNOSIS — L97.919 IDIOPATHIC CHRONIC VENOUS HYPERTENSION OF RIGHT LOWER EXTREMITY WITH ULCER (HCC): ICD-10-CM

## 2021-07-12 PROCEDURE — 11045 DBRDMT SUBQ TISS EACH ADDL: CPT

## 2021-07-12 PROCEDURE — 11042 DBRDMT SUBQ TIS 1ST 20SQCM/<: CPT

## 2021-07-12 PROCEDURE — 11045 DBRDMT SUBQ TISS EACH ADDL: CPT | Performed by: SPECIALIST

## 2021-07-12 PROCEDURE — 29581 APPL MULTLAYER CMPRN SYS LEG: CPT

## 2021-07-12 PROCEDURE — 6370000000 HC RX 637 (ALT 250 FOR IP): Performed by: SPECIALIST

## 2021-07-12 PROCEDURE — 11042 DBRDMT SUBQ TIS 1ST 20SQCM/<: CPT | Performed by: SPECIALIST

## 2021-07-12 RX ORDER — BACITRACIN ZINC AND POLYMYXIN B SULFATE 500; 1000 [USP'U]/G; [USP'U]/G
OINTMENT TOPICAL ONCE
Status: CANCELLED | OUTPATIENT
Start: 2021-07-12 | End: 2021-07-12

## 2021-07-12 RX ORDER — BACITRACIN, NEOMYCIN, POLYMYXIN B 400; 3.5; 5 [USP'U]/G; MG/G; [USP'U]/G
OINTMENT TOPICAL ONCE
Status: CANCELLED | OUTPATIENT
Start: 2021-07-12 | End: 2021-07-12

## 2021-07-12 RX ORDER — GINSENG 100 MG
CAPSULE ORAL ONCE
Status: CANCELLED | OUTPATIENT
Start: 2021-07-12 | End: 2021-07-12

## 2021-07-12 RX ORDER — LIDOCAINE HYDROCHLORIDE 40 MG/ML
SOLUTION TOPICAL ONCE
Status: CANCELLED | OUTPATIENT
Start: 2021-07-12 | End: 2021-07-12

## 2021-07-12 RX ORDER — GENTAMICIN SULFATE 1 MG/G
OINTMENT TOPICAL ONCE
Status: CANCELLED | OUTPATIENT
Start: 2021-07-12 | End: 2021-07-12

## 2021-07-12 RX ORDER — LIDOCAINE 50 MG/G
OINTMENT TOPICAL ONCE
Status: CANCELLED | OUTPATIENT
Start: 2021-07-12 | End: 2021-07-12

## 2021-07-12 RX ORDER — LIDOCAINE 40 MG/G
CREAM TOPICAL ONCE
Status: CANCELLED | OUTPATIENT
Start: 2021-07-12 | End: 2021-07-12

## 2021-07-12 RX ORDER — LIDOCAINE HYDROCHLORIDE 40 MG/ML
SOLUTION TOPICAL ONCE
Status: COMPLETED | OUTPATIENT
Start: 2021-07-12 | End: 2021-07-12

## 2021-07-12 RX ORDER — LIDOCAINE HYDROCHLORIDE 20 MG/ML
JELLY TOPICAL ONCE
Status: CANCELLED | OUTPATIENT
Start: 2021-07-12 | End: 2021-07-12

## 2021-07-12 RX ORDER — BETAMETHASONE DIPROPIONATE 0.05 %
OINTMENT (GRAM) TOPICAL ONCE
Status: CANCELLED | OUTPATIENT
Start: 2021-07-12 | End: 2021-07-12

## 2021-07-12 RX ORDER — CLOBETASOL PROPIONATE 0.5 MG/G
OINTMENT TOPICAL ONCE
Status: CANCELLED | OUTPATIENT
Start: 2021-07-12 | End: 2021-07-12

## 2021-07-12 RX ADMIN — LIDOCAINE HYDROCHLORIDE: 40 SOLUTION TOPICAL at 09:42

## 2021-07-12 ASSESSMENT — PAIN DESCRIPTION - FREQUENCY: FREQUENCY: CONTINUOUS

## 2021-07-12 ASSESSMENT — PAIN DESCRIPTION - ORIENTATION: ORIENTATION: RIGHT

## 2021-07-12 ASSESSMENT — PAIN DESCRIPTION - LOCATION: LOCATION: LEG

## 2021-07-12 ASSESSMENT — PAIN DESCRIPTION - PAIN TYPE: TYPE: CHRONIC PAIN

## 2021-07-12 ASSESSMENT — PAIN DESCRIPTION - DESCRIPTORS: DESCRIPTORS: BURNING

## 2021-07-12 NOTE — PROGRESS NOTES
Multilayer Compression Wrap   (Not Unna) Below the Knee    NAME:  Eugenia Atwood  YOB: 1965  MEDICAL RECORD NUMBER:  [id number]  DATE:  7/12/2021        Removed old Multilayer wrap if present and washed leg with mild soap/water.   Applied moisturizing agent to dry skin as needed.   Applied primary and secondary dressing as ordered     Applied multilayered dressing below the knee to Right lower leg(s)  (4 Layer Compression Wrap ) .   Instructed patient/caregiver not to remove dressing and to keep it clean and dry.   Instructed patient/caregiver on complications to report to provider, such as pain, numbness in toes, heavy drainage, and slippage of dressing.   Instructed patient on purpose of compression dressing and on activity and exercise recommendations.     Applied per   Guidelines    Electronically signed by Zoë Ibrahim RN on 7/12/2021 at 10:15 AM

## 2021-07-12 NOTE — PROGRESS NOTES
[facility] [place]  Progress Note and Procedure Note      Sylvia Quispe  MEDICAL RECORD NUMBER:  [id number]  AGE: 64 y.o. GENDER: male  : 1965  EPISODE DATE:  2021    Subjective:     Chief Complaint   Patient presents with    Wound Check     right leg         HISTORY of PRESENT ILLNESS HPI     Sylvia Quispe is a 64 y.o. male who presents today for wound/ulcer evaluation. History of Wound Context: Continues follow-up for his chronic venous insufficiency with open wounds and lymphedema.  He continues on the oral antibiotics as prescribed by the infectious disease doctor. Genejesus Guadarrama states there is less pain and drainage from the right lower extremity. He claims to be using his lymphedema pumps  He has gone the entire week without having to have his compression wrap changed    Pain Assessment:  Wound/Ulcer Pain Timing/Severity: none  Quality of pain: N/A  Severity:  0 / 10   Modifying Factors: None  Associated Signs/Symptoms: edema and drainage    Ulcer Identification:  Ulcer Type: venous  Contributing Factors: edema, venous stasis, lymphedema, obesity and anticoagulation therapy    Objective:      /80   Pulse 69   Temp 97.3 °F (36.3 °C) (Temporal)   Resp 18     Wt Readings from Last 3 Encounters:   21 243 lb (110.2 kg)   21 236 lb 12.4 oz (107.4 kg)   20 247 lb 12.8 oz (112.4 kg)       PHYSICAL EXAM    Extremities: no cyanosis, no clubbing, 3 + edema-right lower extremity and 3 full-thickness wounds on the anterior posterior medial right knee with fibrin slough granulation tissue and epithelialization at the margins of the wound. Periwound remains dry    Assessment:      1. Noncompliance    2. Chronic acquired lymphedema    3.  Idiopathic chronic venous hypertension of right lower extremity with ulcer ([facility] [address]         Procedure Note  Indications:  Based on my examination of this patient's wound(s) today, sharp excision is required to promote healing and evaluate the extent healing. Performed by: Juanis Flores MD    Consent obtained? Yes    Time out taken: Yes    Pain Control: Anesthetic: 4% Lidocaine Liquid Topical     Debridement:Excisional Debridement    Using curette the wound was sharply debrided    down through and including the removal of  epidermis, dermis and subcutaneous tissue. Devitalized Tissue Debrided:  fibrin and slough      Pre Debridement Measurements:  Are located in the Wound Documentation Flow Sheet   Wound #: 1, 2 and 3     Post  Debridement Measurements:  Wound 05/03/21 Leg Right;Medial;Lower #1 (Active)   Wound Image   07/06/21 0943   Wound Etiology Venous 05/03/21 1120   Wound Cleansed Cleansed with saline 07/12/21 0943   Dressing/Treatment Alginate with Ag 07/12/21 1009   Wound Length (cm) 4 cm 07/12/21 0943   Wound Width (cm) 4.5 cm 07/12/21 0943   Wound Depth (cm) 0.1 cm 07/12/21 0943   Wound Surface Area (cm^2) 18 cm^2 07/12/21 0943   Change in Wound Size % (l*w) 51.15 07/12/21 0943   Wound Volume (cm^3) 1.8 cm^3 07/12/21 0943   Wound Healing % 51 07/12/21 0943   Post-Procedure Length (cm) 4.1 cm 07/12/21 1009   Post-Procedure Width (cm) 4.6 cm 07/12/21 1009   Post-Procedure Depth (cm) 0.3 cm 07/12/21 1009   Post-Procedure Surface Area (cm^2) 18.86 cm^2 07/12/21 1009   Post-Procedure Volume (cm^3) 5. 658 cm^3 07/12/21 1009   Distance Tunneling (cm) 0 cm 07/12/21 0943   Undermining Maxium Distance (cm) 0 07/12/21 0943   Wound Assessment Pink/red;Fibrin; Other (Comment) 07/12/21 0943   Drainage Amount Moderate 07/12/21 0943   Drainage Description Brown;Serosanguinous; Other (Comment);Purulent 07/12/21 0943   Odor Mild 07/12/21 0943   Kiah-wound Assessment Intact 07/12/21 0943   Margins Defined edges 07/12/21 0943   Wound Thickness Description not for Pressure Injury Full thickness 07/12/21 0943   Number of days: 69       Wound 05/03/21 Leg Anterior; Lower;Right #2 cluster (Active)   Wound Image   07/06/21 0943   Wound Etiology Venous 05/03/21 1120 Wound Cleansed Cleansed with saline 07/12/21 0943   Dressing/Treatment Alginate with Ag 07/12/21 1009   Wound Length (cm) 3 cm 07/12/21 0943   Wound Width (cm) 7.2 cm 07/12/21 0943   Wound Depth (cm) 0.1 cm 07/12/21 0943   Wound Surface Area (cm^2) 21.6 cm^2 07/12/21 0943   Change in Wound Size % (l*w) 87.56 07/12/21 0943   Wound Volume (cm^3) 2.16 cm^3 07/12/21 0943   Wound Healing % 88 07/12/21 0943   Post-Procedure Length (cm) 3.1 cm 07/12/21 1009   Post-Procedure Width (cm) 7.3 cm 07/12/21 1009   Post-Procedure Depth (cm) 0.3 cm 07/12/21 1009   Post-Procedure Surface Area (cm^2) 22.63 cm^2 07/12/21 1009   Post-Procedure Volume (cm^3) 6.789 cm^3 07/12/21 1009   Distance Tunneling (cm) 0 cm 07/12/21 0943   Undermining Ends___ O'Clock 0 07/12/21 0943   Undermining Maxium Distance (cm) 0 07/06/21 0943   Wound Assessment Slough;Pink/red 07/12/21 0943   Drainage Amount Moderate 07/12/21 0943   Drainage Description Karthikeyan Fickle; Other (Comment);Purulent 07/12/21 0943   Odor Mild 07/12/21 0943   Kiah-wound Assessment Intact 07/12/21 0943   Margins Undefined edges 07/12/21 0943   Wound Thickness Description not for Pressure Injury Full thickness 07/12/21 0943   Number of days: 69       Wound 05/03/21 Leg Lower;Right;Posterior #3 (Active)   Wound Image   07/06/21 0943   Wound Etiology Venous 05/03/21 1120   Wound Cleansed Cleansed with saline 07/12/21 0943   Dressing/Treatment Alginate with Ag 07/12/21 1009   Wound Length (cm) 2.6 cm 07/12/21 0943   Wound Width (cm) 4.9 cm 07/12/21 0943   Wound Depth (cm) 0.1 cm 07/12/21 0943   Wound Surface Area (cm^2) 12.74 cm^2 07/12/21 0943   Change in Wound Size % (l*w) 60.8 07/12/21 0943   Wound Volume (cm^3) 1.274 cm^3 07/12/21 0943   Wound Healing % 61 07/12/21 0943   Post-Procedure Length (cm) 2.7 cm 07/12/21 1009   Post-Procedure Width (cm) 5 cm 07/12/21 1009   Post-Procedure Depth (cm) 0.3 cm 07/12/21 1009   Post-Procedure Surface Area (cm^2) 13.5 cm^2 07/12/21 1009   Post-Procedure soap and water. · Keep wounds dry in the shower unless otherwise instructed by the physician. · For wounds on lower legs, cast covers can be purchased at local drug stores, so that you may shower and keep the wound(s) dry. [] Instructions for Vashe Wash solution ONLY: Apply enough Vashe to soak a piece of gauze and place on wound bed for 5-10 minutes. DO NOT rinse after Vashe has been applied. Follow dressing application as instructed below. [x] Vashe Wash applied during clinic visit. Kiah wound Topical Treatments:  Do not apply lotions, creams, or ointments to the skin around the wound bed unless directed as followed:     [] Apply around the wound: [] moisturizing lotion [] Antifungal ointment [] No-Sting barrier film [] Zinc paste [] Other:       Dressings:           Wound Location: right lower leg wounds      Apply Primary Dressing to wound:       Alginate with silver pad     Cover and Secure with:     Cover and Secure with: Extra absorbant pad   Avoid contact of tape with skin if possible.  When to change Dressing: [] Daily [] Every Other Day [] Once a week  [] Three times per week: [] Monday, Wednesday, Friday [] Tuesday, Thursday, Saturday  [x] Do Not Change Dressing [] Other:       Edema Control:  Apply: [x] Compression Stocking  [x] Left Lower Leg [] Right Lower Leg        Apply every morning immediately when getting up. They should be applied to affected leg(s) from mid foot to knee making sure to cover the heel. Remove every night before going to bed. [x] Elevate leg(s) above the level of the heart for 30 minutes 4-5 times a day and/or when sitting. [x] Avoid prolonged standing in one place. Multilayer Compression Wrap:    Type: 4 Layer Compression Wrap   · Applied in Clinic to the :  Right lower leg(s) on 7/12/2021  · Do not get leg(s) with wrap wet. · If wraps become too tight call the center or completely remove the wrap.    · Elevate leg(s) above the level of the heart when sitting. · Avoid prolonged standing in one place. Lymphedema Therapy:  [x] Wear Lymphedema pumps twice a day at settings prescribed by your physician. [x]Elevate leg(s) above the level of the heart for 30 minutes 4-5 times a day and/or when sitting. [x] Avoid prolonged standing in one place. Dietary:  Important dietary reminders:  1. Increase Protein intake (i.e. Lean meats, fish, eggs, legumes, and yogurt)  2. No added salt  3. If diabetic, follow a diabetic diet and check glucose prior to meals or as instructed by your physician. If you are still having pain after you go home:   For wounds on lower legs or arms, elevate the affected limb.  Use over-the-counter medications you would normally use for pain as permitted by your primary care doctor.  For persistent pain not relieved by the above interventions, please call your primary care doctor. Return Appointment:   Return Appointment: With Dr. Siddharth Barriga  in  [address] [place] [address])  o Scheduled weekly until (Date):      [] Return Appointment for a Wound Assessment with a nurse on:     []DME/Wound Dressing Supplies ordered at this visit: []Yes []No  o Supplies Provided by:   o Please call them directly to reorder supplies when you run out.  o CONTINUE TO USE THE SUPPLIES YOU HAVE AVAILABLE. IT IS MOST IMPORTANT TO KEEP THE WOUND COVERED AT ALL TIMES. [x] Orders placed during your visit:   Orders Placed This Encounter   Procedures    Initiate Outpatient Wound Care Protocol       Your nurse  is:  Enrrique Moser     Electronically signed by Jj Larsen RN on 7/12/2021 at 10:10 AM     [facility] Information: Should you experience any significant changes in your wound(s) or have questions about your wound care, please contact the [address] [place] [address] at [phone]. We are open from 8:00am - 4:30p Monday thru Friday except for Wednesdays which we are closed. Please give us 24-48 hours to return your call.       Call your doctor now or seek immediate medical care if:    · You have symptoms of infection, such as:  ? Increased pain, swelling, warmth, or redness. ? Red streaks leading from the area. ? Pus draining from the area. ? A fever.         Physician for this visit and orders: Jessica Sweeney MD    [] Patient unable to sign Discharge Instructions given to ECF/Transportation/POA        Electronically signed by Jessica Sweeney MD on 7/12/2021 at 10:57 AM

## 2021-07-14 ENCOUNTER — ANTI-COAG VISIT (OUTPATIENT)
Dept: PHARMACY | Age: 56
End: 2021-07-14
Payer: COMMERCIAL

## 2021-07-14 DIAGNOSIS — I82.5Z9 CHRONIC VENOUS EMBOLISM AND THROMBOSIS OF DEEP VESSELS OF DISTAL LOWER EXTREMITY, UNSPECIFIED LATERALITY (HCC): Primary | ICD-10-CM

## 2021-07-14 LAB — INTERNATIONAL NORMALIZATION RATIO, POC: 2.5

## 2021-07-14 PROCEDURE — 99211 OFF/OP EST MAY X REQ PHY/QHP: CPT

## 2021-07-14 PROCEDURE — 85610 PROTHROMBIN TIME: CPT

## 2021-07-14 NOTE — PROGRESS NOTES
ANTICOAGULATION SERVICE    Javier Carlson is a 64 y.o. male with PMHx significant for chronic DVT (last in Jan, 2019) who presents to clinic 7/14/2021 for anticoagulation monitoring and adjustment. Patient has been taking warfarin for 15+ years.      Anticoagulation Indication(s):  DVT    Referring Physician:  Dr. Julian Veras  Goal INR Range:  2-3  Duration of Anticoagulation Therapy:  Indefinite  Time of day dose taken:  AM  Product patient has at home:  warfarin 5 mg (peach)    INR Summary                            Warfarin regimen (mg)  Date INR   A/P    Sun Mon Tue Wed Thu Fri Sat Mg/wk  7/14 2.5 At goal, cont   7.5 7.5 7.5 7.5 7.5 7.5 7.5 52.5  6/14 2.9 At goal, continue  7.5 7.5 7.5 7.5 0/7.5 7.5 7.5 52.5  5/26 3.9 Above goal (ABX), hold x 1 7.5 7.5 7.5 7.5 0/7.5 7.5 7.5 52.5  3/31 2.5 At goal, no change  7.5 7.5 7.5 7.5 7.5 7.5 7.5 52.5  2/17 3.0 At goal, no change  7.5 7.5 7.5 7.5 7.5 7.5 7.5 52.5  1/6 2.8 At goal, no change  7.5 7.5 7.5 7.5 7.5 7.5 7.5 52.5  12/2 2.9 At goal, no change  7.5 7.5 7.5 7.5 7.5 7.5 7.5 52.5  11/4 3.1 Above goal, continue  7.5 7.5 7.5 7.5 7.5 7.5 7.5 52.5  10/7 2.7 At goal, no change  7.5 7.5 7.5 7.5 7.5 7.5 7.5 52.5  9/9 2.7 At goal, no change  7.5 7.5 7.5 7.5 7.5 7.5 7.5 52.5  8/17 2.8 At goal, no change  7.5 7.5 7.5 7.5 7.5 7.5 7.5 52.5  8/3 3.4 Above goal, decrease  7.5 7.5 7.5 7.5 7.5 7.5 7.5 52.5  6/8 2.9 At goal, no change  7.5 7.5 7.5 7.5 7.5 10 7.5 55  2/28 2.7 At goal, no change  7.5 7.5 7.5 7.5 7.5 10 7.5 55  1/3 2.6 At goal, no change  7.5 7.5 7.5 7.5 7.5 10 7.5 55  11/20 2.8 At goal, no change  7.5 7.5 7.5 7.5 7.5 10 7.5 55  10/23 2.6 At goal, no change  7.5 7.5 7.5 7.5 7.5 10 7.5 55  8/23 2.2 At goal, no change  7.5 7.5 7.5 7.5 7.5 10 7.5 55  7/26 2.5 At goal, no change  7.5 7.5 7.5 7.5 7.5 10 7.5 55  6/28 2.3 At goal, no change  7.5 7.5 7.5 7.5 7.5 10 7.5 55  5/31 2.6 At goal, no change  7.5 7.5 7.5 7.5 7.5 10 7.5 55  5/1 2.5 At goal, no change  7.5 7.5 7.5 7.5 7.5 10 7.5 55  4/10 2.0 At goal, no change  7.5 7.5 7.5 7.5 7.5 10 7.5 55  3/13 2.6 At goal, no change  7.5 7.5 7.5 7.5 7.5 10 7.5 55  2/27 2.7 At goal, no change  7.5 7.5 7.5 7.5 7.5 10 7.5 55  2/20 2.9 At goal, no change  7.5 7.5 7.5 7.5 7.5 10 7.5 55  2/13 2.1 At goal, no change  7.5 7.5 7.5 7.5 7.5 10 7.5 55    Last CBC:  Lab Results   Component Value Date    RBC 3.37 (L) 04/28/2018    HGB 8.3 (L) 04/28/2018    HCT 25.4 (L) 04/28/2018    MCV 75.4 (L) 04/28/2018    MCH 24.6 (L) 04/28/2018    MPV 6.8 04/28/2018    RDW 18.3 (H) 04/28/2018     04/28/2018     Patient History:  Recent hospitalizations/HC visits None reported   Recent medication changes Started clindamycin 300 mg TID about 1 month ago   Medications taken regularly that may interact with warfarin or alter INR None reported   Warfarin dose taken as prescribed Yes - does not use pillbox (only takes warfarin)   Signs/symptoms of bleeding No h/o bleeding reported   Vitamin K intake Normally avoids high vitamin K foods: ~0-1 serving per week   Recent vomiting/diarrhea/fever, changes in weight or activity level None reported   Tobacco or alcohol use Patient reports smoking 0 PPD  Patient reports having 0 drinks per day   Upcoming surgeries or procedures None reported     Assessment/Plan:  Patient's INR was therapeutic today (2.5). Patient denies any missed/extra warfarin doses, diet changes, illness, bleeding, etc since last visit. Started on clindamycin 300 mg TID approximately 1 month ago. Patient was instructed to continue 7.5 mg daily for now. Repeat INR in 6 weeks per patient request.  Patient was reminded to maintain consistent vitamin K intake and call with any bleeding, medication changes, or fever/vomiting/diarrhea. Patient understands dosing directions and information discussed. Dosing schedule and follow up appointment given to patient. Progress note routed to referring physician's office.   Patient acknowledges working in consult agreement with pharmacist as referred by his/her physician. Next Clinic Appointment:      Please call [facility] at [phone] with any questions. Thanks!   Calvin Ramos, PharmD, [facility]  Ph: [phone]  2021 10:36 AM    For Pharmacy Admin Tracking Only     Intervention Detail: Adherence Monitorin   Total # of Interventions Recommended: 1   Total # of Interventions Accepted: 1   Time Spent (min): 15

## 2021-07-19 ENCOUNTER — HOSPITAL ENCOUNTER (OUTPATIENT)
Dept: WOUND CARE | Age: 56
Discharge: HOME | End: 2021-07-19
Payer: COMMERCIAL

## 2021-07-19 VITALS
TEMPERATURE: 97.3 F | RESPIRATION RATE: 18 BRPM | DIASTOLIC BLOOD PRESSURE: 97 MMHG | SYSTOLIC BLOOD PRESSURE: 146 MMHG | HEART RATE: 65 BPM

## 2021-07-19 DIAGNOSIS — I82.220 THROMBOSIS OF INFERIOR VENA CAVA (HCC): ICD-10-CM

## 2021-07-19 DIAGNOSIS — Z79.01 CHRONIC ANTICOAGULATION: ICD-10-CM

## 2021-07-19 DIAGNOSIS — I82.423 THROMBOSIS OF BOTH ILIAC VEINS (HCC): ICD-10-CM

## 2021-07-19 DIAGNOSIS — I82.5Z1 CHRONIC VENOUS EMBOLISM AND THROMBOSIS OF DEEP VESSELS OF DISTAL END OF RIGHT LOWER EXTREMITY (HCC): ICD-10-CM

## 2021-07-19 DIAGNOSIS — I87.311 IDIOPATHIC CHRONIC VENOUS HYPERTENSION OF RIGHT LOWER EXTREMITY WITH ULCER (HCC): ICD-10-CM

## 2021-07-19 DIAGNOSIS — I89.0 CHRONIC ACQUIRED LYMPHEDEMA: ICD-10-CM

## 2021-07-19 DIAGNOSIS — L97.919 IDIOPATHIC CHRONIC VENOUS HYPERTENSION OF RIGHT LOWER EXTREMITY WITH ULCER (HCC): ICD-10-CM

## 2021-07-19 DIAGNOSIS — Z91.199 NONCOMPLIANCE: Primary | ICD-10-CM

## 2021-07-19 PROCEDURE — 11042 DBRDMT SUBQ TIS 1ST 20SQCM/<: CPT

## 2021-07-19 PROCEDURE — 11042 DBRDMT SUBQ TIS 1ST 20SQCM/<: CPT | Performed by: SPECIALIST

## 2021-07-19 PROCEDURE — 11045 DBRDMT SUBQ TISS EACH ADDL: CPT | Performed by: SPECIALIST

## 2021-07-19 PROCEDURE — 6370000000 HC RX 637 (ALT 250 FOR IP): Performed by: SPECIALIST

## 2021-07-19 PROCEDURE — 11045 DBRDMT SUBQ TISS EACH ADDL: CPT

## 2021-07-19 PROCEDURE — 29581 APPL MULTLAYER CMPRN SYS LEG: CPT

## 2021-07-19 RX ORDER — LIDOCAINE HYDROCHLORIDE 40 MG/ML
SOLUTION TOPICAL ONCE
Status: CANCELLED | OUTPATIENT
Start: 2021-07-19 | End: 2021-07-19

## 2021-07-19 RX ORDER — WARFARIN SODIUM 5 MG/1
TABLET ORAL
Qty: 48 TABLET | Refills: 0 | Status: SHIPPED | OUTPATIENT
Start: 2021-07-19 | End: 2021-08-16

## 2021-07-19 RX ORDER — LIDOCAINE HYDROCHLORIDE 20 MG/ML
JELLY TOPICAL ONCE
Status: CANCELLED | OUTPATIENT
Start: 2021-07-19 | End: 2021-07-19

## 2021-07-19 RX ORDER — CLOBETASOL PROPIONATE 0.5 MG/G
OINTMENT TOPICAL ONCE
Status: CANCELLED | OUTPATIENT
Start: 2021-07-19 | End: 2021-07-19

## 2021-07-19 RX ORDER — GINSENG 100 MG
CAPSULE ORAL ONCE
Status: CANCELLED | OUTPATIENT
Start: 2021-07-19 | End: 2021-07-19

## 2021-07-19 RX ORDER — BACITRACIN ZINC AND POLYMYXIN B SULFATE 500; 1000 [USP'U]/G; [USP'U]/G
OINTMENT TOPICAL ONCE
Status: CANCELLED | OUTPATIENT
Start: 2021-07-19 | End: 2021-07-19

## 2021-07-19 RX ORDER — LIDOCAINE 50 MG/G
OINTMENT TOPICAL ONCE
Status: CANCELLED | OUTPATIENT
Start: 2021-07-19 | End: 2021-07-19

## 2021-07-19 RX ORDER — LIDOCAINE HYDROCHLORIDE 40 MG/ML
SOLUTION TOPICAL ONCE
Status: COMPLETED | OUTPATIENT
Start: 2021-07-19 | End: 2021-07-19

## 2021-07-19 RX ORDER — LIDOCAINE 40 MG/G
CREAM TOPICAL ONCE
Status: CANCELLED | OUTPATIENT
Start: 2021-07-19 | End: 2021-07-19

## 2021-07-19 RX ORDER — GENTAMICIN SULFATE 1 MG/G
OINTMENT TOPICAL ONCE
Status: CANCELLED | OUTPATIENT
Start: 2021-07-19 | End: 2021-07-19

## 2021-07-19 RX ORDER — BACITRACIN, NEOMYCIN, POLYMYXIN B 400; 3.5; 5 [USP'U]/G; MG/G; [USP'U]/G
OINTMENT TOPICAL ONCE
Status: CANCELLED | OUTPATIENT
Start: 2021-07-19 | End: 2021-07-19

## 2021-07-19 RX ORDER — BETAMETHASONE DIPROPIONATE 0.05 %
OINTMENT (GRAM) TOPICAL ONCE
Status: CANCELLED | OUTPATIENT
Start: 2021-07-19 | End: 2021-07-19

## 2021-07-19 RX ADMIN — LIDOCAINE HYDROCHLORIDE: 40 SOLUTION TOPICAL at 09:41

## 2021-07-19 ASSESSMENT — PAIN DESCRIPTION - DESCRIPTORS: DESCRIPTORS: BURNING

## 2021-07-19 ASSESSMENT — PAIN DESCRIPTION - PAIN TYPE: TYPE: CHRONIC PAIN

## 2021-07-19 ASSESSMENT — PAIN DESCRIPTION - FREQUENCY: FREQUENCY: CONTINUOUS

## 2021-07-19 ASSESSMENT — PAIN DESCRIPTION - LOCATION: LOCATION: LEG

## 2021-07-19 ASSESSMENT — PAIN SCALES - GENERAL: PAINLEVEL_OUTOF10: 2

## 2021-07-19 ASSESSMENT — PAIN DESCRIPTION - ORIENTATION: ORIENTATION: RIGHT

## 2021-07-19 NOTE — PROGRESS NOTES
[facility] [place]  Progress Note and Procedure Note      Cinda Rolon  MEDICAL RECORD NUMBER:  [id number]  AGE: 64 y.o. GENDER: male  : 1965  EPISODE DATE:  2021    Subjective:     Chief Complaint   Patient presents with    Wound Check     right leg         HISTORY of PRESENT ILLNESS HPI     Cinda Rolon is a 64 y.o. male who presents today for wound/ulcer evaluation. History of Wound Context: Continues follow-up for his chronic venous insufficiency with open wounds and lymphedema.  He has finished the oral antibiotics as prescribed by the infectious disease doctor. Catalina Ken states there is less pain and drainage from the right lower extremity.  He claims to be using his lymphedema pumps  He has gone the entire week without having to have his compression wrap changed    Pain Assessment:  Wound/Ulcer Pain Timing/Severity: none  Quality of pain: N/A  Severity:  0 / 10   Modifying Factors: None  Associated Signs/Symptoms: edema and drainage    Ulcer Identification:  Ulcer Type: venous  Contributing Factors: edema, venous stasis, lymphedema, obesity and anticoagulation therapy    Objective:      BP (!) 146/97   Pulse 65   Temp 97.3 °F (36.3 °C) (Temporal)   Resp 18     Wt Readings from Last 3 Encounters:   21 243 lb (110.2 kg)   21 236 lb 12.4 oz (107.4 kg)   20 247 lb 12.8 oz (112.4 kg)       PHYSICAL EXAM    Extremities: no cyanosis, no clubbing, 2 + edema-  right lower extremity and 3 separate full-thickness wounds on the anterior, posterior, and medial right knee with fibrin slough granulation tissue and marked epithelialization at the margins of the wound. Periwound remains dry    Assessment:      1. Noncompliance    2. Chronic acquired lymphedema    3.  Idiopathic chronic venous hypertension of right lower extremity with ulcer ([facility] [address]         Procedure Note  Indications:  Based on my examination of this patient's wound(s) today, sharp excision is required to promote healing and evaluate the extent healing. Performed by: Ruben Bloch, MD    Consent obtained? Yes    Time out taken: Yes    Pain Control: Anesthetic: 4% Lidocaine Liquid Topical     Debridement:Excisional Debridement    Using curette the wound was sharply debrided    down through and including the removal of  epidermis, dermis and subcutaneous tissue. Devitalized Tissue Debrided:  fibrin, biofilm and slough      Pre Debridement Measurements:  Are located in the Wound Documentation Flow Sheet   Wound #: 1, 2 and 3     Post  Debridement Measurements:  Wound 05/03/21 Leg Right;Medial;Lower #1 (Active)   Wound Image   07/06/21 0943   Wound Etiology Venous 05/03/21 1120   Wound Cleansed Cleansed with saline 07/19/21 0947   Dressing/Treatment Alginate with Ag 07/19/21 1026   Wound Length (cm) 3.9 cm 07/19/21 0947   Wound Width (cm) 3.5 cm 07/19/21 0947   Wound Depth (cm) 0.1 cm 07/19/21 0947   Wound Surface Area (cm^2) 13.65 cm^2 07/19/21 0947   Change in Wound Size % (l*w) 62.96 07/19/21 0947   Wound Volume (cm^3) 1.365 cm^3 07/19/21 0947   Wound Healing % 63 07/19/21 0947   Post-Procedure Length (cm) 4 cm 07/19/21 1017   Post-Procedure Width (cm) 3.6 cm 07/19/21 1017   Post-Procedure Depth (cm) 0.3 cm 07/19/21 1017   Post-Procedure Surface Area (cm^2) 14.4 cm^2 07/19/21 1017   Post-Procedure Volume (cm^3) 4.32 cm^3 07/19/21 1017   Distance Tunneling (cm) 0 cm 07/19/21 0947   Undermining Maxium Distance (cm) 0 07/19/21 0947   Wound Assessment Fibrin;Pink/red 07/19/21 0947   Drainage Amount Moderate 07/19/21 0947   Drainage Description Brown 07/19/21 0947   Odor Mild 07/19/21 0947   Kiah-wound Assessment Intact 07/19/21 0947   Margins Defined edges 07/19/21 0947   Wound Thickness Description not for Pressure Injury Full thickness 07/19/21 0947   Number of days: 77       Wound 05/03/21 Leg Anterior; Lower;Right #2 cluster (Active)   Wound Image   07/06/21 0943   Wound Etiology Venous 05/03/21 1120   Wound Cleansed Cleansed with saline 07/19/21 0947   Dressing/Treatment Alginate with Ag 07/19/21 1026   Wound Length (cm) 3 cm 07/19/21 0947   Wound Width (cm) 6.5 cm 07/19/21 0947   Wound Depth (cm) 0.1 cm 07/19/21 0947   Wound Surface Area (cm^2) 19.5 cm^2 07/19/21 0947   Change in Wound Size % (l*w) 88.77 07/19/21 0947   Wound Volume (cm^3) 1.95 cm^3 07/19/21 0947   Wound Healing % 89 07/19/21 0947   Post-Procedure Length (cm) 3.1 cm 07/19/21 1017   Post-Procedure Width (cm) 6.6 cm 07/19/21 1017   Post-Procedure Depth (cm) 0.3 cm 07/19/21 1017   Post-Procedure Surface Area (cm^2) 20.46 cm^2 07/19/21 1017   Post-Procedure Volume (cm^3) 6.138 cm^3 07/19/21 1017   Distance Tunneling (cm) 0 cm 07/19/21 0947   Undermining Ends___ O'Clock 0 07/12/21 0943   Undermining Maxium Distance (cm) 0 07/19/21 0947   Wound Assessment Fibrin;Pink/red;Devitalized tissue 07/19/21 0947   Drainage Amount Moderate 07/19/21 0947   Drainage Description Brown 07/19/21 0947   Odor Mild 07/19/21 0947   Kiah-wound Assessment Intact 07/19/21 0947   Margins Undefined edges 07/19/21 0947   Wound Thickness Description not for Pressure Injury Full thickness 07/19/21 0947   Number of days: 77       Wound 05/03/21 Leg Lower;Right;Posterior #3 (Active)   Wound Image   07/06/21 0943   Wound Etiology Venous 05/03/21 1120   Wound Cleansed Cleansed with saline 07/19/21 0947   Dressing/Treatment Alginate with Ag 07/19/21 1026   Wound Length (cm) 2 cm 07/19/21 0947   Wound Width (cm) 4.9 cm 07/19/21 0947   Wound Depth (cm) 0.1 cm 07/19/21 0947   Wound Surface Area (cm^2) 9.8 cm^2 07/19/21 0947   Change in Wound Size % (l*w) 69.85 07/19/21 0947   Wound Volume (cm^3) 0.98 cm^3 07/19/21 0947   Wound Healing % 70 07/19/21 0947   Post-Procedure Length (cm) 2.1 cm 07/19/21 1017   Post-Procedure Width (cm) 5 cm 07/19/21 1017   Post-Procedure Depth (cm) 0.3 cm 07/19/21 1017   Post-Procedure Surface Area (cm^2) 10.5 cm^2 07/19/21 1017   Post-Procedure Volume (cm^3) 3.15 cm^3 07/19/21 1017   Distance Tunneling (cm) 0 cm 07/19/21 0947   Undermining Maxium Distance (cm) 0 07/19/21 0947   Wound Assessment Pink/red;Fibrin 07/19/21 0947   Drainage Amount Moderate 07/19/21 0947   Drainage Description Brown 07/19/21 0947   Odor Mild 07/19/21 0947   Kiah-wound Assessment Intact 07/19/21 0947   Margins Defined edges 07/19/21 0947   Wound Thickness Description not for Pressure Injury Full thickness 07/19/21 0947   Number of days: 77          Percent of Wound Debrided: 100%    Total Surface Area Debrided:  45 sq cm    Diabetic/Pressure/Non Pressure Ulcers only:  Ulcer: Non-Pressure ulcer, fat layer exposed    Bleeding: Minimal    Hemostasis Achieved: by pressure    Procedural Pain: 0  / 10     Post Procedural Pain: 0 / 10     Response to treatment:  Well tolerated by patient. Wounds measure smaller we will continue with the same therapy plans        Plan:     Treatment Note: Please see attached Discharge Instructions. These instructions were given and signed by the patient or POA    New Medication(s) at this visit:   New Prescriptions    No medications on file       Other orders at this visit:   Orders Placed This Encounter   Procedures   [address] [address] Protocol       Discharge Instructions          [facility] Physician Orders and Discharge Instructions  [address] [address] [address]  Telephone: [phone] hands with soap and water prior to and after every dressing change. Wound Cleansing: (All wounds are cleaned with 0.9% saline during your wound care visit)   · Do not scrub or use excessive force. · With each dressing change, rinse wounds with 0.9% Saline. (May use wound wash or soft contact solution. Both can be purchased at a local drug store). · If unable to obtain saline, may use a gentle soap and water.   · Keep wounds dry in the shower unless otherwise instructed by the physician. · For wounds on lower legs, cast covers can be purchased at local drug stores, so that you may shower and keep the wound(s) dry. [] Instructions for Vashe Wash solution ONLY: Apply enough Vashe to soak a piece of gauze and place on wound bed for 5-10 minutes. DO NOT rinse after Vashe has been applied. Follow dressing application as instructed below. [x] Vashe Wash applied during clinic visit. Kiah wound Topical Treatments:  Do not apply lotions, creams, or ointments to the skin around the wound bed unless directed as followed:     [] Apply around the wound: [] moisturizing lotion [] Antifungal ointment [] No-Sting barrier film [] Zinc paste [] Other:       Dressings:           Wound Location: right lower leg wound      Apply Primary Dressing to wound:       Alginate with silver pad     Cover and Secure with:     Cover and Secure with: ABD pad   Avoid contact of tape with skin if possible.  When to change Dressing: [] Daily [] Every Other Day [] Once a week  [] Three times per week: [] Monday, Wednesday, Friday [] Tuesday, Thursday, Saturday  [x] Do Not Change Dressing [] Other:       Edema Control:  Apply: [x] Compression Stocking  [x] Left Lower Leg [] Right Lower Leg     Apply every morning immediately when getting up. They should be applied to affected leg(s) from mid foot to knee making sure to cover the heel. Remove every night before going to bed. [x] Elevate leg(s) above the level of the heart for 30 minutes 4-5 times a day and/or when sitting. [x] Avoid prolonged standing in one place. Multilayer Compression Wrap:    Type: 4 Layer Compression Wrap   · Applied in Clinic to the :  Right lower leg(s) on 7/19/2021  · Do not get leg(s) with wrap wet. · If wraps become too tight call the center or completely remove the wrap. · Elevate leg(s) above the level of the heart when sitting. · Avoid prolonged standing in one place.      Lymphedema Therapy:  [x] Wear Lymphedema pumps twice a day at settings prescribed by your physician. [x]Elevate leg(s) above the level of the heart for 30 minutes 4-5 times a day and/or when sitting. [x] Avoid prolonged standing in one place. Dietary:  Important dietary reminders:  1. Increase Protein intake (i.e. Lean meats, fish, eggs, legumes, and yogurt)  2. No added salt  3. If diabetic, follow a diabetic diet and check glucose prior to meals or as instructed by your physician. If you are still having pain after you go home:   For wounds on lower legs or arms, elevate the affected limb.  Use over-the-counter medications you would normally use for pain as permitted by your primary care doctor.  For persistent pain not relieved by the above interventions, please call your primary care doctor. Return Appointment:   Return Appointment: With Dr. Leah Arroyo  in  [address] [place] [address])  o Scheduled weekly until (Date):      [] Return Appointment for a Wound Assessment with a nurse on:     []DME/Wound Dressing Supplies ordered at this visit: []Yes []No  o Supplies Provided by:   o Please call them directly to reorder supplies when you run out.  o CONTINUE TO USE THE SUPPLIES YOU HAVE AVAILABLE. IT IS MOST IMPORTANT TO KEEP THE WOUND COVERED AT ALL TIMES. [x] Orders placed during your visit:   Orders Placed This Encounter   Procedures    Initiate Outpatient Wound Care Protocol       Your nurse  is:  Alfred Perkins     Electronically signed by Elizabeth Reyna RN on 7/19/2021 at 10:19 AM     [facility] Information: Should you experience any significant changes in your wound(s) or have questions about your wound care, please contact the [address] [place] [address] at [phone]. We are open from 8:00am - 4:30p Monday thru Friday except for Wednesdays which we are closed. Please give us 24-48 hours to return your call.       Call your doctor now or seek immediate medical care if:    · You have symptoms of infection, such as:  ? Increased pain, swelling, warmth, or redness. ? Red streaks leading from the area. ? Pus draining from the area. ? A fever.         Physician for this visit and orders: Howard Pastor MD    [] Patient unable to sign Discharge Instructions given to ECF/Transportation/POA        Electronically signed by Howard Pastor MD on 7/19/2021 at 11:33 AM

## 2021-07-19 NOTE — PROGRESS NOTES
Multilayer Compression Wrap   (Not Unna) Below the Knee    NAME:  Ruben Bustamante  YOB: 1965  MEDICAL RECORD NUMBER:  [id number]  DATE:  7/19/2021        Removed old Multilayer wrap if present and washed leg with mild soap/water.   Applied moisturizing agent to dry skin as needed.   Applied primary and secondary dressing as ordered     Applied multilayered dressing below the knee to Right lower leg(s)  (4 Layer Compression Wrap ) .   Instructed patient/caregiver not to remove dressing and to keep it clean and dry.   Instructed patient/caregiver on complications to report to provider, such as pain, numbness in toes, heavy drainage, and slippage of dressing.   Instructed patient on purpose of compression dressing and on activity and exercise recommendations.     Applied per   Guidelines    Electronically signed by Arslan Graves RN on 7/19/2021 at 10:26 AM

## 2021-07-26 ENCOUNTER — HOSPITAL ENCOUNTER (OUTPATIENT)
Dept: WOUND CARE | Age: 56
Discharge: HOME | End: 2021-07-26
Payer: COMMERCIAL

## 2021-07-26 VITALS
RESPIRATION RATE: 16 BRPM | DIASTOLIC BLOOD PRESSURE: 90 MMHG | HEART RATE: 74 BPM | SYSTOLIC BLOOD PRESSURE: 139 MMHG | TEMPERATURE: 96.8 F

## 2021-07-26 DIAGNOSIS — L97.919 IDIOPATHIC CHRONIC VENOUS HYPERTENSION OF RIGHT LOWER EXTREMITY WITH ULCER (HCC): ICD-10-CM

## 2021-07-26 DIAGNOSIS — Z91.199 NONCOMPLIANCE: Primary | ICD-10-CM

## 2021-07-26 DIAGNOSIS — I87.311 IDIOPATHIC CHRONIC VENOUS HYPERTENSION OF RIGHT LOWER EXTREMITY WITH ULCER (HCC): ICD-10-CM

## 2021-07-26 DIAGNOSIS — I89.0 CHRONIC ACQUIRED LYMPHEDEMA: ICD-10-CM

## 2021-07-26 PROCEDURE — 11042 DBRDMT SUBQ TIS 1ST 20SQCM/<: CPT | Performed by: SPECIALIST

## 2021-07-26 PROCEDURE — 11042 DBRDMT SUBQ TIS 1ST 20SQCM/<: CPT

## 2021-07-26 PROCEDURE — 11045 DBRDMT SUBQ TISS EACH ADDL: CPT | Performed by: SPECIALIST

## 2021-07-26 PROCEDURE — 29581 APPL MULTLAYER CMPRN SYS LEG: CPT

## 2021-07-26 PROCEDURE — 11045 DBRDMT SUBQ TISS EACH ADDL: CPT

## 2021-07-26 PROCEDURE — 6370000000 HC RX 637 (ALT 250 FOR IP): Performed by: SPECIALIST

## 2021-07-26 RX ORDER — GINSENG 100 MG
CAPSULE ORAL ONCE
Status: CANCELLED | OUTPATIENT
Start: 2021-07-26 | End: 2021-07-26

## 2021-07-26 RX ORDER — BACITRACIN ZINC AND POLYMYXIN B SULFATE 500; 1000 [USP'U]/G; [USP'U]/G
OINTMENT TOPICAL ONCE
Status: CANCELLED | OUTPATIENT
Start: 2021-07-26 | End: 2021-07-26

## 2021-07-26 RX ORDER — LIDOCAINE HYDROCHLORIDE 20 MG/ML
JELLY TOPICAL ONCE
Status: CANCELLED | OUTPATIENT
Start: 2021-07-26 | End: 2021-07-26

## 2021-07-26 RX ORDER — LIDOCAINE HYDROCHLORIDE 40 MG/ML
SOLUTION TOPICAL ONCE
Status: CANCELLED | OUTPATIENT
Start: 2021-07-26 | End: 2021-07-26

## 2021-07-26 RX ORDER — GENTAMICIN SULFATE 1 MG/G
OINTMENT TOPICAL ONCE
Status: CANCELLED | OUTPATIENT
Start: 2021-07-26 | End: 2021-07-26

## 2021-07-26 RX ORDER — CLOBETASOL PROPIONATE 0.5 MG/G
OINTMENT TOPICAL ONCE
Status: CANCELLED | OUTPATIENT
Start: 2021-07-26 | End: 2021-07-26

## 2021-07-26 RX ORDER — LIDOCAINE 40 MG/G
CREAM TOPICAL ONCE
Status: CANCELLED | OUTPATIENT
Start: 2021-07-26 | End: 2021-07-26

## 2021-07-26 RX ORDER — LIDOCAINE 50 MG/G
OINTMENT TOPICAL ONCE
Status: CANCELLED | OUTPATIENT
Start: 2021-07-26 | End: 2021-07-26

## 2021-07-26 RX ORDER — BETAMETHASONE DIPROPIONATE 0.05 %
OINTMENT (GRAM) TOPICAL ONCE
Status: CANCELLED | OUTPATIENT
Start: 2021-07-26 | End: 2021-07-26

## 2021-07-26 RX ORDER — BACITRACIN, NEOMYCIN, POLYMYXIN B 400; 3.5; 5 [USP'U]/G; MG/G; [USP'U]/G
OINTMENT TOPICAL ONCE
Status: CANCELLED | OUTPATIENT
Start: 2021-07-26 | End: 2021-07-26

## 2021-07-26 RX ORDER — LIDOCAINE HYDROCHLORIDE 40 MG/ML
SOLUTION TOPICAL ONCE
Status: COMPLETED | OUTPATIENT
Start: 2021-07-26 | End: 2021-07-26

## 2021-07-26 RX ADMIN — LIDOCAINE HYDROCHLORIDE: 40 SOLUTION TOPICAL at 09:32

## 2021-07-26 ASSESSMENT — PAIN DESCRIPTION - ORIENTATION: ORIENTATION: RIGHT

## 2021-07-26 ASSESSMENT — PAIN DESCRIPTION - LOCATION: LOCATION: LEG

## 2021-07-26 ASSESSMENT — PAIN DESCRIPTION - ONSET: ONSET: ON-GOING

## 2021-07-26 ASSESSMENT — PAIN DESCRIPTION - FREQUENCY: FREQUENCY: INTERMITTENT

## 2021-07-26 ASSESSMENT — PAIN DESCRIPTION - PAIN TYPE: TYPE: ACUTE PAIN

## 2021-07-26 ASSESSMENT — PAIN DESCRIPTION - PROGRESSION: CLINICAL_PROGRESSION: NOT CHANGED

## 2021-07-26 ASSESSMENT — PAIN SCALES - GENERAL: PAINLEVEL_OUTOF10: 1

## 2021-07-26 ASSESSMENT — PAIN DESCRIPTION - DESCRIPTORS: DESCRIPTORS: ACHING

## 2021-07-26 NOTE — PROGRESS NOTES
[facility]  Progress Note and Procedure Note      Leslie Khan  MEDICAL RECORD NUMBER:  [id number]  AGE: 64 y.o. GENDER: male  : 1965  EPISODE DATE:  2021    Subjective:     Chief Complaint   Patient presents with    Wound Check     right lower leg         HISTORY of PRESENT ILLNESS HPI     Leslie Khan is a 64 y.o. male who presents today for wound/ulcer evaluation. History of Wound Context: Continues follow-up for his chronic venous insufficiency with open wounds and lymphedema.  He has finished the oral antibiotics as prescribed by the infectious disease doctor. [facility] states there is less pain and drainage from the right lower extremity.  He claims to be using his lymphedema pumps  He has gone the entire week without having to have his compression wrap changed    Pain Assessment:  Wound/Ulcer Pain Timing/Severity: none  Quality of pain: N/A  Severity:  0 / 10   Modifying Factors: None  Associated Signs/Symptoms: edema and drainage    Ulcer Identification:  Ulcer Type: venous  Contributing Factors: edema, venous stasis, lymphedema, obesity and anticoagulation therapy    Objective:      BP (!) 139/90   Pulse 74   Temp 96.8 °F (36 °C) (Tympanic)   Resp 16     Wt Readings from Last 3 Encounters:   21 243 lb (110.2 kg)   21 236 lb 12.4 oz (107.4 kg)   20 247 lb 12.8 oz (112.4 kg)       PHYSICAL EXAM    Extremities: no cyanosis, no clubbing, 2 + edema-  right lower extremity and 3 separate full-thickness wounds on the anterior posterior and medial knee containing fibrin, slough,             granulation tissue with marked epithelialization at the margins of the wound. Periwound remains dry    Assessment:      1. Noncompliance    2. Chronic acquired lymphedema    3.  Idiopathic chronic venous hypertension of right lower extremity with ulcer ([facility] [address]         Procedure Note  Indications:  Based on my examination of this patient's wound(s) today, sharp excision is required to promote healing and evaluate the extent healing. Performed by: Demarco Hernandez MD    Consent obtained? Yes    Time out taken: Yes    Pain Control: Anesthetic: 4% Lidocaine Liquid Topical     Debridement:Excisional Debridement    Using curette the wound was sharply debrided    down through and including the removal of  epidermis, dermis and subcutaneous tissue. Devitalized Tissue Debrided:  fibrin and slough      Pre Debridement Measurements:  Are located in the Wound Documentation Flow Sheet   Wound #: 1, 2 and 3     Post  Debridement Measurements:  Wound 05/03/21 Leg Right;Medial;Lower #1 (Active)   Wound Image   07/26/21 1023   Wound Etiology Venous 05/03/21 1120   Wound Cleansed Cleansed with saline 07/26/21 0931   Dressing/Treatment Hydrofera blue 07/26/21 1023   Wound Length (cm) 3.4 cm 07/26/21 0931   Wound Width (cm) 3.2 cm 07/26/21 0931   Wound Depth (cm) 0.1 cm 07/26/21 0931   Wound Surface Area (cm^2) 10.88 cm^2 07/26/21 0931   Change in Wound Size % (l*w) 70.47 07/26/21 0931   Wound Volume (cm^3) 1.088 cm^3 07/26/21 0931   Wound Healing % 70 07/26/21 0931   Post-Procedure Length (cm) 3.5 cm 07/26/21 1007   Post-Procedure Width (cm) 3.3 cm 07/26/21 1007   Post-Procedure Depth (cm) 0.3 cm 07/26/21 1007   Post-Procedure Surface Area (cm^2) 11.55 cm^2 07/26/21 1007   Post-Procedure Volume (cm^3) 3.465 cm^3 07/26/21 1007   Distance Tunneling (cm) 0 cm 07/19/21 0947   Undermining Maxium Distance (cm) 0 07/19/21 0947   Wound Assessment Fibrin;Pink/red;Slough 07/26/21 0931   Drainage Amount Moderate 07/26/21 0931   Drainage Description Green;Brown 07/26/21 0931   Odor Mild 07/26/21 0931   Kiah-wound Assessment Intact 07/26/21 0931   Margins Defined edges 07/26/21 0931   Wound Thickness Description not for Pressure Injury Full thickness 07/26/21 0931   Number of days: 83       Wound 05/03/21 Leg Anterior; Lower;Right #2 cluster (Active)   Wound Image   07/26/21 1023   Wound Etiology Venous 05/03/21 1120   Wound Cleansed Cleansed with saline 07/26/21 0931   Dressing/Treatment Hydrofera blue 07/26/21 1023   Wound Length (cm) 2.5 cm 07/26/21 0931   Wound Width (cm) 5.7 cm 07/26/21 0931   Wound Depth (cm) 0.1 cm 07/26/21 0931   Wound Surface Area (cm^2) 14.25 cm^2 07/26/21 0931   Change in Wound Size % (l*w) 91.79 07/26/21 0931   Wound Volume (cm^3) 1.425 cm^3 07/26/21 0931   Wound Healing % 92 07/26/21 0931   Post-Procedure Length (cm) 2.6 cm 07/26/21 1007   Post-Procedure Width (cm) 5.8 cm 07/26/21 1007   Post-Procedure Depth (cm) 0.3 cm 07/26/21 1007   Post-Procedure Surface Area (cm^2) 15.08 cm^2 07/26/21 1007   Post-Procedure Volume (cm^3) 4.524 cm^3 07/26/21 1007   Distance Tunneling (cm) 0 cm 07/19/21 0947   Undermining Ends___ O'Clock 0 07/12/21 0943   Undermining Maxium Distance (cm) 0 07/19/21 0947   Wound Assessment Pink/red 07/26/21 0931   Drainage Amount Moderate 07/26/21 0931   Drainage Description Brown 07/26/21 0931   Odor Mild 07/26/21 0931   Kiah-wound Assessment Intact 07/26/21 0931   Margins Unattached edges 07/26/21 0931   Wound Thickness Description not for Pressure Injury Full thickness 07/26/21 0931   Number of days: 83       Wound 05/03/21 Leg Lower;Right;Posterior #3 (Active)   Wound Image   07/26/21 1023   Wound Etiology Venous 05/03/21 1120   Wound Cleansed Cleansed with saline 07/26/21 0931   Dressing/Treatment Hydrofera blue 07/26/21 1023   Wound Length (cm) 1.5 cm 07/26/21 0931   Wound Width (cm) 3.9 cm 07/26/21 0931   Wound Depth (cm) 0.1 cm 07/26/21 0931   Wound Surface Area (cm^2) 5.85 cm^2 07/26/21 0931   Change in Wound Size % (l*w) 82 07/26/21 0931   Wound Volume (cm^3) 0.585 cm^3 07/26/21 0931   Wound Healing % 82 07/26/21 0931   Post-Procedure Length (cm) 1.6 cm 07/26/21 1007   Post-Procedure Width (cm) 4 cm 07/26/21 1007   Post-Procedure Depth (cm) 0.3 cm 07/26/21 1007   Post-Procedure Surface Area (cm^2) 6.4 cm^2 07/26/21 1007   Post-Procedure Volume (cm^3) 1.92 store). · If unable to obtain saline, may use a gentle soap and water. · Keep wounds dry in the shower unless otherwise instructed by the physician. · For wounds on lower legs, cast covers can be purchased at local drug stores, so that you may shower and keep the wound(s) dry. [] Instructions for Vashe Wash solution ONLY: Apply enough Vashe to soak a piece of gauze and place on wound bed for 5-10 minutes. DO NOT rinse after Vashe has been applied. Follow dressing application as instructed below. [] Vashe Wash applied during clinic visit. Kiah wound Topical Treatments:  Do not apply lotions, creams, or ointments to the skin around the wound bed unless directed as followed:     [] Apply around the wound: [] moisturizing lotion [] Antifungal ointment [] No-Sting barrier film [] Zinc paste [] Other:       Dressings:           Wound Location: right lower leg wounds      Apply Primary Dressing to wound:       hydrofera blue ready     Cover and Secure with:     Cover and Secure with: 4X4 gauze pad   Avoid contact of tape with skin if possible.  When to change Dressing: [] Daily [] Every Other Day [] Once a week  [] Three times per week: [] Monday, Wednesday, Friday [] Tuesday, Thursday, Saturday  [x] Do Not Change Dressing [] Other:     Edema Control:  Apply: [x] Compression Stocking  [x] Left Lower Leg [] Right Lower Leg        Apply every morning immediately when getting up. They should be applied to affected leg(s) from mid foot to knee making sure to cover the heel. Remove every night before going to bed. [x] Elevate leg(s) above the level of the heart for 30 minutes 4-5 times a day and/or when sitting. [x] Avoid prolonged standing in one place. Multilayer Compression Wrap:    Type: 4 Layer Compression Wrap   · Applied in Clinic to the :  Right lower leg(s) on 7/26/2021  · Do not get leg(s) with wrap wet. · If wraps become too tight call the center or completely remove the wrap. · Elevate leg(s) above the level of the heart when sitting. · Avoid prolonged standing in one place. Lymphedema Therapy:  [x] Wear Lymphedema pumps twice a day at settings prescribed by your physician. [x]Elevate leg(s) above the level of the heart for 30 minutes 4-5 times a day and/or when sitting. [x] Avoid prolonged standing in one place. Dietary:  Important dietary reminders:  1. Increase Protein intake (i.e. Lean meats, fish, eggs, legumes, and yogurt)  2. No added salt  3. If diabetic, follow a diabetic diet and check glucose prior to meals or as instructed by your physician. If you are still having pain after you go home:   For wounds on lower legs or arms, elevate the affected limb.  Use over-the-counter medications you would normally use for pain as permitted by your primary care doctor.  For persistent pain not relieved by the above interventions, please call your primary care doctor. Return Appointment:   Return Appointment: With Dr. Johanny Gonsales  in  [address] [place] [address])  o Scheduled weekly until (Date):      [] Return Appointment for a Wound Assessment with a nurse on:     []DME/Wound Dressing Supplies ordered at this visit: []Yes []No  o Supplies Provided by:   o Please call them directly to reorder supplies when you run out.  o CONTINUE TO USE THE SUPPLIES YOU HAVE AVAILABLE. IT IS MOST IMPORTANT TO KEEP THE WOUND COVERED AT ALL TIMES. [x] Orders placed during your visit:   Orders Placed This Encounter   Procedures    Initiate Outpatient Wound Care Protocol       Your nurse  is:  Mandy Early     Electronically signed by Ashish Schofield RN on 7/26/2021 at 10:10 AM     [facility] Information: Should you experience any significant changes in your wound(s) or have questions about your wound care, please contact the [address] [place] [address] at [phone]. We are open from 8:00am - 4:30p Monday thru Friday except for Wednesdays which we are closed.  Please give us 24-48 hours to return your call. Call your doctor now or seek immediate medical care if:    · You have symptoms of infection, such as:  ? Increased pain, swelling, warmth, or redness. ? Red streaks leading from the area. ? Pus draining from the area. ? A fever.         Physician for this visit and orders: Artis Ceja MD    [] Patient unable to sign Discharge Instructions given to ECF/Transportation/POA        Electronically signed by Artis Ceja MD on 7/26/2021 at 11:01 AM

## 2021-07-26 NOTE — PROGRESS NOTES
Multilayer Compression Wrap   (Not Unna) Below the Knee    NAME:  Leta Santo  YOB: 1965  MEDICAL RECORD NUMBER:  [id number]  DATE:  7/26/2021        Removed old Multilayer wrap if present and washed leg with mild soap/water.   Applied moisturizing agent to dry skin as needed.   Applied primary and secondary dressing as ordered     Applied multilayered dressing below the knee to Right lower leg(s)  (4 Layer Compression Wrap ) .   Instructed patient/caregiver not to remove dressing and to keep it clean and dry.   Instructed patient/caregiver on complications to report to provider, such as pain, numbness in toes, heavy drainage, and slippage of dressing.   Instructed patient on purpose of compression dressing and on activity and exercise recommendations.     Applied per   Guidelines    Electronically signed by Argenis River RN on 7/26/2021 at 10:24 AM

## 2021-07-28 ENCOUNTER — OFFICE VISIT (OUTPATIENT)
Dept: INFECTIOUS DISEASES | Age: 56
End: 2021-07-28
Payer: COMMERCIAL

## 2021-07-28 VITALS
HEART RATE: 65 BPM | BODY MASS INDEX: 38.01 KG/M2 | SYSTOLIC BLOOD PRESSURE: 114 MMHG | HEIGHT: 67 IN | OXYGEN SATURATION: 98 % | WEIGHT: 242.2 LBS | DIASTOLIC BLOOD PRESSURE: 76 MMHG | TEMPERATURE: 98.3 F

## 2021-07-28 DIAGNOSIS — L97.919 IDIOPATHIC CHRONIC VENOUS HYPERTENSION OF RIGHT LOWER EXTREMITY WITH ULCER (HCC): ICD-10-CM

## 2021-07-28 DIAGNOSIS — L97.919 LEG ULCER, RIGHT, WITH UNSPECIFIED SEVERITY (HCC): Primary | ICD-10-CM

## 2021-07-28 DIAGNOSIS — M79.661 PAIN AND SWELLING OF RIGHT LOWER LEG: ICD-10-CM

## 2021-07-28 DIAGNOSIS — I87.311 IDIOPATHIC CHRONIC VENOUS HYPERTENSION OF RIGHT LOWER EXTREMITY WITH ULCER (HCC): ICD-10-CM

## 2021-07-28 DIAGNOSIS — M79.89 PAIN AND SWELLING OF RIGHT LOWER LEG: ICD-10-CM

## 2021-07-28 PROCEDURE — 99214 OFFICE O/P EST MOD 30 MIN: CPT | Performed by: INTERNAL MEDICINE

## 2021-07-28 RX ORDER — CLINDAMYCIN HYDROCHLORIDE 300 MG/1
300 CAPSULE ORAL 3 TIMES DAILY
Qty: 90 CAPSULE | Refills: 2 | Status: SHIPPED | OUTPATIENT
Start: 2021-07-28 | End: 2021-08-27

## 2021-07-28 NOTE — PROGRESS NOTES
Infectious Diseases Follow-up Note    Reason for Consult:   R leg wound infection  Requesting Physician:   Dr Yaneli Guillen  Primary Care Physician:  Marcus Zapata DO  History Obtained From:   Patient, EPIC    CHIEF COMPLAINT:      Chief Complaint   Patient presents with    Follow-up     hosp f/u L leg wound       HISTORY OF PRESENT ILLNESS:      65 yo man with hx LE DVT  Pt with chronic LE edema, L > R, chronic R LE wounds, followed at [place] [address] 4/2018, had OR debridement 4/24/18 ([place]). Hx MDR Pseudomonas aeruginosa    6/16/21 - Office consult  Pain worse starting end of April 2021. Assoc with increase in wound drainage  Rx doxycycline with some improvement in pain and drainage. Doxy ended on 6/13    Wound cult 5/3/21 - mod Ps aeruginosa, MSSA, C striatum  Pseudomonas aeruginosa (1)  Antibiotic Interpretation JIGNESH   cefepime Sensitive 8 mcg/mL   ciprofloxacin Resistant >2 mcg/mL   gentamicin Sensitive <=4 mcg/mL   meropenem Sensitive <=1 mcg/mL   piperacillin-tazobactam Intermediate 32 mcg/mL   tobramycin Sensitive <=4 mcg/mL     Staphylococcus aureus (2)  Antibiotic Interpretation JIGNESH   clindamycin Sensitive <=0.25 mcg/mL   erythromycin Resistant >=8 mcg/mL   oxacillin Sensitive 0.5 mcg/mL   tetracycline Sensitive <=1 mcg/mL   trimethoprim-sulfamethoxazole Sensitive <=10 mcg/mL     C/o 'foul-smelling draining wounds'  Started on Clindamycin    7/28/21 - Follow-up visit  Last seen in [facility] 7/26 - 'less pain and drainage' per note  Pt reports he is doing well and wounds smaller   Will have  Compression 'wrap' changed at [facility] weekly.     Pt  (Melodie Strong in [place])      Past Medical History:    Past Medical History:   Diagnosis Date    DVT (deep venous thrombosis) ([address]     Hx of blood clots     Pain and swelling of right lower leg        Past Surgical History:    Past Surgical History:   Procedure Laterality Date    BALLOON ANGIOPLASTY, ARTERY Right 12/19/2017    at [facility] SKIN SPLIT GRAFT Right        Current Medications:    Current Outpatient Medications   Medication Sig Dispense Refill    warfarin (COUMADIN) 5 MG tablet TAKE 1 AND 1/2 TABLET BY MOUTH DAILY EXCEPT TAKE 2 TABLETS ON FRIDAY 48 tablet 0    Compression Stockings MISC by Does not apply route Strength 30-40mmHg  Style: Other pull up compression stockings  Extremity: bilateral  Donning aid recommended: No 1 each 0    Wound Cleansers (VASHE CLEANSING) SOLN Apply 5 mLs topically three times a week vashe wash soaked gauze 5-10 minutes  to right lower leg wounds with each dressing change 250 Bottle 0    Compression Stockings MISC by Does not apply route 40-50mmHG bilateral knee high, closed toe, custom fitted zipper stockings    ICD 10- I87.311, L97.919 1 each 3    Handicap Placard MISC by Does not apply route Dx Lower loeg DVT chronic, bilateral. Effective 2/12/2018 until 2/12/2021. 1 each 0     No current facility-administered medications for this visit. Allergies:  Patient has no known allergies. Social History:    TOBACCO:                TISG      ETOH:                        EBID      DRUGS:                     None      MARITAL STATUS:                 OCCUPATION:          [employer] and     Family History:   No immunodeficiency     REVIEW OF SYSTEMS:    No fever / chills / sweats. No weight loss. No visual change, eye pain, eye discharge. No oral lesion, sore throat, dysphagia. Denies cough / sputum. Denies chest pain, palpitations. Denies n / v / abd pain. No diarrhea. Denies dysuria or change in urinary function. Denies joint swelling or pain. No myalgia, arthralgia. Denies skin changes, itching  Denies new / worse depression, psychiatric symptoms  Denies focal weakness, sensory change or other neurologic symptom  No symptoms endocrinopathy. No symptoms hematologic disease. PHYSICAL EXAM:    Vitals:  See intake vitals including weight    GENERAL: No apparent distress. HEENT: Membranes moist, no oral lesion  NECK:  Supple  LYMPH: No adenopathy   LUNGS: Clear b/l, no rales, no dullness  CARDIAC: RRR, no murmur appreciated  ABD:  + BS, soft / NT  EXT:  No rash, no edema, no lesions  NEURO: No focal neurologic findings  PSYCH: Orientation, sensorium, mood normal  Wound:  LE with dressing from [facility]    Reviewed images in Epic from 7/26/21, compared to pictures from 6/16/21 2/19/18 Wound: light Ps aeruginosa  Antibiotic Interpretation JIGNESH Unit   cefepime Intermediate 16 mcg/mL   ciprofloxacin Resistant >2 mcg/mL   gentamicin Sensitive <=4 mcg/mL   meropenem Resistant >8 mcg/mL   piperacillin-tazobactam Intermediate 32 mcg/mL   tobramycin Sensitive <=4 mcg/mL      1/5/18 Wound: light Ps fluorescens/putida, Ps aeruginosa  PSEUDOMONAS FLUORESCENS PUTIDA [facility]   Antibiotic Interpretation JIGNESH Unit   cefepime Intermediate 16 mcg/mL   ciprofloxacin Resistant >2 mcg/mL   gentamicin Sensitive <=4 mcg/mL   meropenem Resistant >8 mcg/mL   piperacillin-tazobactam Sensitive <=16 mcg/mL   tobramycin Sensitive <=4 mcg/mL          PSEUDOMONAS AERUGINOSA   Antibiotic Interpretation JIGNESH Unit   cefepime Sensitive 4 mcg/mL   ciprofloxacin Resistant >2 mcg/mL   gentamicin Sensitive <=4 mcg/mL   meropenem Sensitive 2 mcg/mL   piperacillin-tazobactam Sensitive <=16 mcg/mL   tobramycin Sensitive <=4 mcg/mL            IMPRESSION    Hx DVT, LE edema  L LE chronic wounds  - OR debridement 4/18   - last cult 5/2021 with MSSA, C striatum, Ps aeruginosa   - improved with po doxycycline   Today 6/16- no cellulitis, no gross infection    Discussed using iv antibiotics - would need PICC, iv mult time a day   Pt hesitant - did not want to start at this time      RECOMMENDATIONS:      Cont with [facility] - hydrofera blue, gauze, compression - 4 layer per note, will be changed in [facility] weekly  If increase in drainage / pain, restart clindamycin 300 tid (gram pos activity, would not have impact on Pseudomonas)  If increase in drainage - green / odor, call me (may need PICC and iv antibiotic to cover Pseudomons    F/u [facility]  R/u with me in 2-3 mo    Discussed with pt that he may iv antibiotics and / or OR debridement (use verijet) in future     - Spent 35 minutes on visit (including history, physical exam, review of data, development and implementation of treatment plan and coordination of care. - Over 50% of time spent in pt counseling and education.     Homero Del Angel MD

## 2021-08-02 ENCOUNTER — HOSPITAL ENCOUNTER (OUTPATIENT)
Dept: WOUND CARE | Age: 56
Discharge: HOME | End: 2021-08-02
Payer: COMMERCIAL

## 2021-08-02 VITALS
TEMPERATURE: 97.8 F | WEIGHT: 243.17 LBS | HEART RATE: 65 BPM | RESPIRATION RATE: 16 BRPM | DIASTOLIC BLOOD PRESSURE: 80 MMHG | SYSTOLIC BLOOD PRESSURE: 130 MMHG | BODY MASS INDEX: 38.09 KG/M2

## 2021-08-02 DIAGNOSIS — I89.0 CHRONIC ACQUIRED LYMPHEDEMA: ICD-10-CM

## 2021-08-02 DIAGNOSIS — L97.919 IDIOPATHIC CHRONIC VENOUS HYPERTENSION OF RIGHT LOWER EXTREMITY WITH ULCER (HCC): ICD-10-CM

## 2021-08-02 DIAGNOSIS — Z91.199 NONCOMPLIANCE: Primary | ICD-10-CM

## 2021-08-02 DIAGNOSIS — I87.311 IDIOPATHIC CHRONIC VENOUS HYPERTENSION OF RIGHT LOWER EXTREMITY WITH ULCER (HCC): ICD-10-CM

## 2021-08-02 PROCEDURE — 11045 DBRDMT SUBQ TISS EACH ADDL: CPT

## 2021-08-02 PROCEDURE — 6370000000 HC RX 637 (ALT 250 FOR IP): Performed by: SPECIALIST

## 2021-08-02 PROCEDURE — 11042 DBRDMT SUBQ TIS 1ST 20SQCM/<: CPT

## 2021-08-02 PROCEDURE — 29581 APPL MULTLAYER CMPRN SYS LEG: CPT

## 2021-08-02 PROCEDURE — 11045 DBRDMT SUBQ TISS EACH ADDL: CPT | Performed by: SPECIALIST

## 2021-08-02 PROCEDURE — 11042 DBRDMT SUBQ TIS 1ST 20SQCM/<: CPT | Performed by: SPECIALIST

## 2021-08-02 RX ORDER — GENTAMICIN SULFATE 1 MG/G
OINTMENT TOPICAL ONCE
Status: CANCELLED | OUTPATIENT
Start: 2021-08-02 | End: 2021-08-02

## 2021-08-02 RX ORDER — BACITRACIN, NEOMYCIN, POLYMYXIN B 400; 3.5; 5 [USP'U]/G; MG/G; [USP'U]/G
OINTMENT TOPICAL ONCE
Status: CANCELLED | OUTPATIENT
Start: 2021-08-02 | End: 2021-08-02

## 2021-08-02 RX ORDER — LIDOCAINE HYDROCHLORIDE 40 MG/ML
SOLUTION TOPICAL ONCE
Status: COMPLETED | OUTPATIENT
Start: 2021-08-02 | End: 2021-08-02

## 2021-08-02 RX ORDER — LIDOCAINE 50 MG/G
OINTMENT TOPICAL ONCE
Status: CANCELLED | OUTPATIENT
Start: 2021-08-02 | End: 2021-08-02

## 2021-08-02 RX ORDER — LIDOCAINE HYDROCHLORIDE 40 MG/ML
SOLUTION TOPICAL ONCE
Status: CANCELLED | OUTPATIENT
Start: 2021-08-02 | End: 2021-08-02

## 2021-08-02 RX ORDER — GINSENG 100 MG
CAPSULE ORAL ONCE
Status: CANCELLED | OUTPATIENT
Start: 2021-08-02 | End: 2021-08-02

## 2021-08-02 RX ORDER — BETAMETHASONE DIPROPIONATE 0.05 %
OINTMENT (GRAM) TOPICAL ONCE
Status: CANCELLED | OUTPATIENT
Start: 2021-08-02 | End: 2021-08-02

## 2021-08-02 RX ORDER — LIDOCAINE HYDROCHLORIDE 20 MG/ML
JELLY TOPICAL ONCE
Status: CANCELLED | OUTPATIENT
Start: 2021-08-02 | End: 2021-08-02

## 2021-08-02 RX ORDER — CLOBETASOL PROPIONATE 0.5 MG/G
OINTMENT TOPICAL ONCE
Status: CANCELLED | OUTPATIENT
Start: 2021-08-02 | End: 2021-08-02

## 2021-08-02 RX ORDER — LIDOCAINE 40 MG/G
CREAM TOPICAL ONCE
Status: CANCELLED | OUTPATIENT
Start: 2021-08-02 | End: 2021-08-02

## 2021-08-02 RX ORDER — BACITRACIN ZINC AND POLYMYXIN B SULFATE 500; 1000 [USP'U]/G; [USP'U]/G
OINTMENT TOPICAL ONCE
Status: CANCELLED | OUTPATIENT
Start: 2021-08-02 | End: 2021-08-02

## 2021-08-02 RX ADMIN — LIDOCAINE HYDROCHLORIDE: 40 SOLUTION TOPICAL at 09:43

## 2021-08-02 ASSESSMENT — PAIN DESCRIPTION - LOCATION: LOCATION: LEG

## 2021-08-02 ASSESSMENT — PAIN DESCRIPTION - FREQUENCY: FREQUENCY: INTERMITTENT

## 2021-08-02 ASSESSMENT — PAIN DESCRIPTION - PAIN TYPE: TYPE: ACUTE PAIN

## 2021-08-02 ASSESSMENT — PAIN DESCRIPTION - ORIENTATION: ORIENTATION: RIGHT

## 2021-08-02 ASSESSMENT — PAIN DESCRIPTION - DESCRIPTORS: DESCRIPTORS: ACHING

## 2021-08-02 ASSESSMENT — PAIN SCALES - GENERAL: PAINLEVEL_OUTOF10: 5

## 2021-08-02 NOTE — PROGRESS NOTES
Multilayer Compression Wrap   (Not Unna) Below the Knee    NAME:  Javier Carlson  YOB: 1965  MEDICAL RECORD NUMBER:  [id number]  DATE:  8/2/2021        Removed old Multilayer wrap if present and washed leg with mild soap/water.   Applied moisturizing agent to dry skin as needed.   Applied primary and secondary dressing as ordered     Applied multilayered dressing below the knee to Right lower leg(s)  (4 Layer Compression Wrap ) .   Instructed patient/caregiver not to remove dressing and to keep it clean and dry.   Instructed patient/caregiver on complications to report to provider, such as pain, numbness in toes, heavy drainage, and slippage of dressing.   Instructed patient on purpose of compression dressing and on activity and exercise recommendations.     Applied per   Guidelines    Electronically signed by Stacey Staton RN on 8/2/2021 at 10:10 AM

## 2021-08-02 NOTE — PROGRESS NOTES
[facility]  Progress Note and Procedure Note      Regla Francis  MEDICAL RECORD NUMBER:  [id number]  AGE: 64 y.o. GENDER: male  : 1965  EPISODE DATE:  2021    Subjective:     Chief Complaint   Patient presents with    Wound Check     right leg         HISTORY of PRESENT ILLNESS HPI     Regla Francis is a 64 y.o. male who presents today for wound/ulcer evaluation. History of Wound Context: Continues follow-up for his chronic venous insufficiency with open wounds and lymphedema.  He has finished the oral antibiotics as prescribed by the infectious disease doctor. [facility] states there is less pain and drainage from the right lower extremity.  He claims to be using his lymphedema pumps  He has gone the entire week without having to have his compression wrap changed. He has recently seen infectious disease    Pain Assessment:  Wound/Ulcer Pain Timing/Severity: none  Quality of pain: N/A  Severity:  0 / 10   Modifying Factors: None  Associated Signs/Symptoms: edema and drainage    Ulcer Identification:  Ulcer Type: venous  Contributing Factors: edema, venous stasis, lymphedema, obesity and anticoagulation therapy    Objective:      /80   Pulse 65   Temp 97.8 °F (36.6 °C) (Temporal)   Resp 16   Wt 243 lb 2.7 oz (110.3 kg)   BMI 38.09 kg/m²     Wt Readings from Last 3 Encounters:   21 243 lb 2.7 oz (110.3 kg)   21 242 lb 3.2 oz (109.9 kg)   21 243 lb (110.2 kg)       PHYSICAL EXAM    Extremities: no cyanosis, no clubbing, 2 + edema-  right lower extremity and full thickness wounds containing fibrin slough and granulation tissue with epithelialization at the margins involving anterior, posterior, and medial knee    Assessment:      1. Noncompliance    2. Chronic acquired lymphedema    3.  Idiopathic chronic venous hypertension of right lower extremity with ulcer ([facility] [address]         Procedure Note  Indications:  Based on my examination of this patient's wound(s) 05/03/21 1120   Wound Cleansed Cleansed with saline 08/02/21 0930   Dressing/Treatment Hydrofera blue 08/02/21 1008   Wound Length (cm) 1.9 cm 08/02/21 0930   Wound Width (cm) 3 cm 08/02/21 0930   Wound Depth (cm) 0.1 cm 08/02/21 0930   Wound Surface Area (cm^2) 5.7 cm^2 08/02/21 0930   Change in Wound Size % (l*w) 96.72 08/02/21 0930   Wound Volume (cm^3) 0.57 cm^3 08/02/21 0930   Wound Healing % 97 08/02/21 0930   Post-Procedure Length (cm) 2 cm 08/02/21 0955   Post-Procedure Width (cm) 3.1 cm 08/02/21 0955   Post-Procedure Depth (cm) 0.3 cm 08/02/21 0955   Post-Procedure Surface Area (cm^2) 6.2 cm^2 08/02/21 0955   Post-Procedure Volume (cm^3) 1.86 cm^3 08/02/21 0955   Distance Tunneling (cm) 0 cm 08/02/21 0930   Undermining Ends___ O'Clock 0 07/12/21 0943   Undermining Maxium Distance (cm) 0 08/02/21 0930   Wound Assessment Pink/red 08/02/21 0930   Drainage Amount Moderate 08/02/21 0930   Drainage Description Brown 08/02/21 0930   Odor Mild 08/02/21 0930   Kiah-wound Assessment Intact 08/02/21 0930   Margins Defined edges 08/02/21 0930   Wound Thickness Description not for Pressure Injury Full thickness 08/02/21 0930   Number of days: 90       Wound 05/03/21 Leg Lower;Right;Posterior #3 (Active)   Wound Image   07/26/21 1023   Wound Etiology Venous 05/03/21 1120   Wound Cleansed Cleansed with saline 08/02/21 0930   Dressing/Treatment Hydrofera blue 08/02/21 1008   Wound Length (cm) 1.5 cm 08/02/21 0930   Wound Width (cm) 2.5 cm 08/02/21 0930   Wound Depth (cm) 0.1 cm 08/02/21 0930   Wound Surface Area (cm^2) 3.75 cm^2 08/02/21 0930   Change in Wound Size % (l*w) 88.46 08/02/21 0930   Wound Volume (cm^3) 0.375 cm^3 08/02/21 0930   Wound Healing % 88 08/02/21 0930   Post-Procedure Length (cm) 1.6 cm 08/02/21 0955   Post-Procedure Width (cm) 2.6 cm 08/02/21 0955   Post-Procedure Depth (cm) 0.3 cm 08/02/21 0955   Post-Procedure Surface Area (cm^2) 4.16 cm^2 08/02/21 0955   Post-Procedure Volume (cm^3) 1.248 cm^3 08/02/21 0955   Distance Tunneling (cm) 0 cm 07/19/21 0947   Undermining Maxium Distance (cm) 0 07/19/21 0947   Wound Assessment Fibrin;Pink/red 07/26/21 0931   Drainage Amount Moderate 07/26/21 0931   Drainage Description Brown 07/26/21 0931   Odor Mild 07/26/21 0931   Kiah-wound Assessment Intact 07/26/21 0931   Margins Defined edges 07/26/21 0931   Wound Thickness Description not for Pressure Injury Full thickness 07/26/21 0931   Number of days: 90       Wound 08/02/21 Leg Anterior; Lateral;Right #4 (Active)   Wound Image   08/02/21 0930   Wound Cleansed Cleansed with saline 08/02/21 0930   Dressing/Treatment Hydrofera blue 08/02/21 1008   Wound Length (cm) 1.4 cm 08/02/21 0930   Wound Width (cm) 1.7 cm 08/02/21 0930   Wound Depth (cm) 0.1 cm 08/02/21 0930   Wound Surface Area (cm^2) 2.38 cm^2 08/02/21 0930   Wound Volume (cm^3) 0.238 cm^3 08/02/21 0930   Post-Procedure Length (cm) 1.5 cm 08/02/21 0955   Post-Procedure Width (cm) 1.8 cm 08/02/21 0955   Post-Procedure Depth (cm) 0.3 cm 08/02/21 0955   Post-Procedure Surface Area (cm^2) 2.7 cm^2 08/02/21 0955   Post-Procedure Volume (cm^3) 0.81 cm^3 08/02/21 0955   Distance Tunneling (cm) 0 cm 08/02/21 0930   Undermining Maxium Distance (cm) 0 08/02/21 0930   Wound Assessment Purple/maroon;Fibrin 08/02/21 0930   Drainage Amount Moderate 08/02/21 0930   Drainage Description Brown 08/02/21 0930   Odor Mild 08/02/21 0930   Kiah-wound Assessment Intact 08/02/21 0930   Margins Defined edges 08/02/21 0930   Wound Thickness Description not for Pressure Injury Full thickness 08/02/21 0930   Number of days: 0          Percent of Wound Debrided: 100%    Total Surface Area Debrided:  22.9 sq cm    Diabetic/Pressure/Non Pressure Ulcers only:  Ulcer: Non-Pressure ulcer, fat layer exposed    Bleeding: Minimal    Hemostasis Achieved: by pressure    Procedural Pain: 0  / 10     Post Procedural Pain: 0 / 10     Response to treatment:  Well tolerated by patient.   All wounds continue to measure smaller with present therapy        Plan:     Treatment Note: Please see attached Discharge Instructions. These instructions were given and signed by the patient or POA    New Medication(s) at this visit:   New Prescriptions    No medications on file       Other orders at this visit:   Orders Placed This Encounter   Procedures   [id number] Protocol       Discharge Instructions          [facility] Physician Orders and Discharge Instructions  [address] [address] [address] [address] [facility]. [address]  Telephone: [phone] hands with soap and water prior to and after every dressing change. Wound Cleansing: (All wounds are cleaned with 0.9% saline during your wound care visit)   · Do not scrub or use excessive force. · With each dressing change, rinse wounds with 0.9% Saline. (May use wound wash or soft contact solution. Both can be purchased at a local drug store). · If unable to obtain saline, may use a gentle soap and water. · Keep wounds dry in the shower unless otherwise instructed by the physician. · For wounds on lower legs, cast covers can be purchased at local drug stores, so that you may shower and keep the wound(s) dry. [] Instructions for Vashe Wash solution ONLY: Apply enough Vashe to soak a piece of gauze and place on wound bed for 5-10 minutes. DO NOT rinse after Vashe has been applied. Follow dressing application as instructed below. [x] Vashe Wash applied during clinic visit.     Kiah wound Topical Treatments:  Do not apply lotions, creams, or ointments to the skin around the wound bed unless directed as followed:     [] Apply around the wound: [] moisturizing lotion [] Antifungal ointment [] No-Sting barrier film [] Zinc paste [] Other:       Dressings:           Wound Location: right lower leg wounds      Apply Primary Dressing to wound:       Hydrofera Blue ready    [employer] and Secure with: Cover and Secure with: 4X4 gauze pad   Avoid contact of tape with skin if possible.  When to change Dressing: [] Daily [] Every Other Day [] Once a week  [] Three times per week: [] Monday, Wednesday, Friday [] Tuesday, Thursday, Saturday  [x] Do Not Change Dressing [] Other:     Edema Control:  Apply: [x] Compression Stocking  [x] Left Lower Leg [] Right Lower Leg        Apply every morning immediately when getting up. They should be applied to affected leg(s) from mid foot to knee making sure to cover the heel. Remove every night before going to bed. [x] Elevate leg(s) above the level of the heart for 30 minutes 4-5 times a day and/or when sitting. [x] Avoid prolonged standing in one place. Multilayer Compression Wrap:    Type: 4 Layer Compression Wrap   · Applied in Clinic to the :  Right lower leg(s) on 8/2/2021  · Do not get leg(s) with wrap wet. · If wraps become too tight call the center or completely remove the wrap. · Elevate leg(s) above the level of the heart when sitting. · Avoid prolonged standing in one place. Lymphedema Therapy:  [x] Wear Lymphedema pumps twice a day at settings prescribed by your physician. [x]Elevate leg(s) above the level of the heart for 30 minutes 4-5 times a day and/or when sitting. [x] Avoid prolonged standing in one place. Dietary:  Important dietary reminders:  1. Increase Protein intake (i.e. Lean meats, fish, eggs, legumes, and yogurt)  2. No added salt  3. If diabetic, follow a diabetic diet and check glucose prior to meals or as instructed by your physician. If you are still having pain after you go home:   For wounds on lower legs or arms, elevate the affected limb.  Use over-the-counter medications you would normally use for pain as permitted by your primary care doctor.  For persistent pain not relieved by the above interventions, please call your primary care doctor.      Return Appointment:   Return Appointment: With  Siddharth Barriga  in  [employer])  o Scheduled weekly until (Date):      [] Return Appointment for a Wound Assessment with a nurse on:     [x]DME/Wound Dressing Supplies ordered at this visit: []Yes []No  o Supplies Provided by:   o Please call them directly to reorder supplies when you run out.  o CONTINUE TO USE THE SUPPLIES YOU HAVE AVAILABLE. IT IS MOST IMPORTANT TO KEEP THE WOUND COVERED AT ALL TIMES. [x] Orders placed during your visit:   Orders Placed This Encounter   Procedures    Initiate Outpatient Wound Care Protocol       Your nurse  is: Enrrique Moser     Electronically signed by Jj Larsen RN on 8/2/2021 at 9:58 AM     [facility] Information: Should you experience any significant changes in your wound(s) or have questions about your wound care, please contact the [address] [place] [address] at [phone]. We are open from 8:00am - 4:30p Monday thru Friday except for Wednesdays which we are closed. Please give us 24-48 hours to return your call. Call your doctor now or seek immediate medical care if:    · You have symptoms of infection, such as:  ? Increased pain, swelling, warmth, or redness. ? Red streaks leading from the area. ? Pus draining from the area. ? A fever.         Physician for this visit and orders: Jyoti Gann MD    [] Patient unable to sign Discharge Instructions given to ECF/Transportation/POA        Electronically signed by Jyoti Gann MD on 8/2/2021 at 10:18 AM

## 2021-08-09 ENCOUNTER — HOSPITAL ENCOUNTER (OUTPATIENT)
Dept: WOUND CARE | Age: 56
Discharge: HOME | End: 2021-08-09
Payer: COMMERCIAL

## 2021-08-09 VITALS
HEART RATE: 69 BPM | DIASTOLIC BLOOD PRESSURE: 90 MMHG | RESPIRATION RATE: 18 BRPM | SYSTOLIC BLOOD PRESSURE: 162 MMHG | TEMPERATURE: 97.1 F

## 2021-08-09 DIAGNOSIS — I89.0 CHRONIC ACQUIRED LYMPHEDEMA: ICD-10-CM

## 2021-08-09 DIAGNOSIS — L97.919 IDIOPATHIC CHRONIC VENOUS HYPERTENSION OF RIGHT LOWER EXTREMITY WITH ULCER (HCC): ICD-10-CM

## 2021-08-09 DIAGNOSIS — I87.311 IDIOPATHIC CHRONIC VENOUS HYPERTENSION OF RIGHT LOWER EXTREMITY WITH ULCER (HCC): ICD-10-CM

## 2021-08-09 DIAGNOSIS — Z91.199 NONCOMPLIANCE: Primary | ICD-10-CM

## 2021-08-09 PROCEDURE — 11042 DBRDMT SUBQ TIS 1ST 20SQCM/<: CPT | Performed by: SPECIALIST

## 2021-08-09 PROCEDURE — 29581 APPL MULTLAYER CMPRN SYS LEG: CPT

## 2021-08-09 PROCEDURE — 11045 DBRDMT SUBQ TISS EACH ADDL: CPT | Performed by: SPECIALIST

## 2021-08-09 PROCEDURE — 6370000000 HC RX 637 (ALT 250 FOR IP): Performed by: SPECIALIST

## 2021-08-09 PROCEDURE — 11042 DBRDMT SUBQ TIS 1ST 20SQCM/<: CPT

## 2021-08-09 PROCEDURE — 11045 DBRDMT SUBQ TISS EACH ADDL: CPT

## 2021-08-09 RX ORDER — GINSENG 100 MG
CAPSULE ORAL ONCE
Status: CANCELLED | OUTPATIENT
Start: 2021-08-09 | End: 2021-08-09

## 2021-08-09 RX ORDER — LIDOCAINE 50 MG/G
OINTMENT TOPICAL ONCE
Status: CANCELLED | OUTPATIENT
Start: 2021-08-09 | End: 2021-08-09

## 2021-08-09 RX ORDER — LIDOCAINE HYDROCHLORIDE 20 MG/ML
JELLY TOPICAL ONCE
Status: CANCELLED | OUTPATIENT
Start: 2021-08-09 | End: 2021-08-09

## 2021-08-09 RX ORDER — BACITRACIN ZINC AND POLYMYXIN B SULFATE 500; 1000 [USP'U]/G; [USP'U]/G
OINTMENT TOPICAL ONCE
Status: CANCELLED | OUTPATIENT
Start: 2021-08-09 | End: 2021-08-09

## 2021-08-09 RX ORDER — BETAMETHASONE DIPROPIONATE 0.05 %
OINTMENT (GRAM) TOPICAL ONCE
Status: CANCELLED | OUTPATIENT
Start: 2021-08-09 | End: 2021-08-09

## 2021-08-09 RX ORDER — LIDOCAINE 40 MG/G
CREAM TOPICAL ONCE
Status: CANCELLED | OUTPATIENT
Start: 2021-08-09 | End: 2021-08-09

## 2021-08-09 RX ORDER — LIDOCAINE HYDROCHLORIDE 40 MG/ML
SOLUTION TOPICAL ONCE
Status: CANCELLED | OUTPATIENT
Start: 2021-08-09 | End: 2021-08-09

## 2021-08-09 RX ORDER — LIDOCAINE HYDROCHLORIDE 40 MG/ML
SOLUTION TOPICAL ONCE
Status: COMPLETED | OUTPATIENT
Start: 2021-08-09 | End: 2021-08-09

## 2021-08-09 RX ORDER — BACITRACIN, NEOMYCIN, POLYMYXIN B 400; 3.5; 5 [USP'U]/G; MG/G; [USP'U]/G
OINTMENT TOPICAL ONCE
Status: CANCELLED | OUTPATIENT
Start: 2021-08-09 | End: 2021-08-09

## 2021-08-09 RX ORDER — CLOBETASOL PROPIONATE 0.5 MG/G
OINTMENT TOPICAL ONCE
Status: CANCELLED | OUTPATIENT
Start: 2021-08-09 | End: 2021-08-09

## 2021-08-09 RX ORDER — GENTAMICIN SULFATE 1 MG/G
OINTMENT TOPICAL ONCE
Status: CANCELLED | OUTPATIENT
Start: 2021-08-09 | End: 2021-08-09

## 2021-08-09 RX ADMIN — LIDOCAINE HYDROCHLORIDE: 40 SOLUTION TOPICAL at 10:27

## 2021-08-09 ASSESSMENT — PAIN DESCRIPTION - DESCRIPTORS: DESCRIPTORS: ACHING

## 2021-08-09 ASSESSMENT — PAIN DESCRIPTION - LOCATION: LOCATION: LEG

## 2021-08-09 ASSESSMENT — PAIN DESCRIPTION - PAIN TYPE: TYPE: CHRONIC PAIN

## 2021-08-09 ASSESSMENT — PAIN DESCRIPTION - ORIENTATION: ORIENTATION: RIGHT

## 2021-08-09 ASSESSMENT — PAIN SCALES - GENERAL: PAINLEVEL_OUTOF10: 2

## 2021-08-09 NOTE — PROGRESS NOTES
12[facility]  Progress Note and Procedure Note      Jarad Coffman  MEDICAL RECORD NUMBER:  [id number]  AGE: 64 y.o. GENDER: male  : 1965  EPISODE DATE:  2021    Subjective:     Chief Complaint   Patient presents with    Wound Check     Right leg         HISTORY of PRESENT ILLNESS HPI     Jarad Coffman is a 64 y.o. male who presents today for wound/ulcer evaluation. History of Wound Context: Continues follow-up for his chronic venous insufficiency with open wounds and lymphedema.  He has finished the oral antibiotics as prescribed by the infectious disease doctor. [facility] states there is less pain and drainage from the right lower extremity.  He claims to be using his lymphedema pumps  He has gone the entire week without having to have his compression wrap changed. Pain Assessment:  Wound/Ulcer Pain Timing/Severity: none  Quality of pain: N/A  Severity:  0 / 10   Modifying Factors: None  Associated Signs/Symptoms: edema and drainage    Ulcer Identification:  Ulcer Type: venous  Contributing Factors: edema, venous stasis, lymphedema, obesity and anticoagulation therapy    Objective:      BP (!) 162/90   Pulse 69   Temp 97.1 °F (36.2 °C) (Temporal)   Resp 18     Wt Readings from Last 3 Encounters:   21 243 lb 2.7 oz (110.3 kg)   21 242 lb 3.2 oz (109.9 kg)   21 243 lb (110.2 kg)       PHYSICAL EXAM    Extremities: no cyanosis, no clubbing, 2 + edema-   right lower extremity with full-thickness wounds containing fibrin, slough, granulation and epithelialization at the margins at the anterior medial lateral and posterior knee    Assessment:      1. Noncompliance    2. Chronic acquired lymphedema    3.  Idiopathic chronic venous hypertension of right lower extremity with ulcer ([facility] [address]         Procedure Note  Indications:  Based on my examination of this patient's wound(s) today, sharp excision is required to promote healing and evaluate the extent healing. Performed by: Levern Gaucher, MD    Consent obtained? Yes    Time out taken: Yes    Pain Control: Anesthetic: 4% Lidocaine Liquid Topical     Debridement:Excisional Debridement    Using curette the wound was sharply debrided    down through and including the removal of  epidermis, dermis and subcutaneous tissue. Devitalized Tissue Debrided:  fibrin and slough      Pre Debridement Measurements:  Are located in the Wound Documentation Flow Sheet   Wound #: 1, 2, 3 and 4     Post  Debridement Measurements:  Wound 05/03/21 Leg Right;Medial;Lower #1 (Active)   Wound Image   07/26/21 1023   Wound Etiology Venous 05/03/21 1120   Wound Cleansed Cleansed with saline 08/09/21 1012   Dressing/Treatment Collagen 08/09/21 1041   Wound Length (cm) 3.2 cm 08/09/21 1012   Wound Width (cm) 3 cm 08/09/21 1012   Wound Depth (cm) 0.1 cm 08/09/21 1012   Wound Surface Area (cm^2) 9.6 cm^2 08/09/21 1012   Change in Wound Size % (l*w) 73.95 08/09/21 1012   Wound Volume (cm^3) 0.96 cm^3 08/09/21 1012   Wound Healing % 74 08/09/21 1012   Post-Procedure Length (cm) 3.3 cm 08/09/21 1041   Post-Procedure Width (cm) 3.1 cm 08/09/21 1041   Post-Procedure Depth (cm) 0.3 cm 08/09/21 1041   Post-Procedure Surface Area (cm^2) 10.23 cm^2 08/09/21 1041   Post-Procedure Volume (cm^3) 3.069 cm^3 08/09/21 1041   Distance Tunneling (cm) 0 cm 08/02/21 0930   Undermining Maxium Distance (cm) 0 08/02/21 0930   Wound Assessment Pink/red;Fibrin 08/09/21 1012   Drainage Amount Moderate 08/09/21 1012   Drainage Description Brown 08/09/21 1012   Odor Mild 08/09/21 1012   Kiah-wound Assessment Intact 08/09/21 1012   Margins Defined edges 08/09/21 1012   Wound Thickness Description not for Pressure Injury Full thickness 08/09/21 1012   Number of days: 97       Wound 05/03/21 Leg Anterior; Lower;Right #2  (Active)   Wound Image   07/26/21 1023   Wound Etiology Venous 05/03/21 1120   Wound Cleansed Cleansed with saline 08/09/21 1012   Dressing/Treatment Collagen 08/09/21 1041   Wound Length (cm) 1.5 cm 08/09/21 1012   Wound Width (cm) 2.1 cm 08/09/21 1012   Wound Depth (cm) 0.1 cm 08/09/21 1012   Wound Surface Area (cm^2) 3.15 cm^2 08/09/21 1012   Change in Wound Size % (l*w) 98.19 08/09/21 1012   Wound Volume (cm^3) 0.315 cm^3 08/09/21 1012   Wound Healing % 98 08/09/21 1012   Post-Procedure Length (cm) 1.6 cm 08/09/21 1041   Post-Procedure Width (cm) 2.2 cm 08/09/21 1041   Post-Procedure Depth (cm) 0.3 cm 08/09/21 1041   Post-Procedure Surface Area (cm^2) 3.52 cm^2 08/09/21 1041   Post-Procedure Volume (cm^3) 1. 056 cm^3 08/09/21 1041   Distance Tunneling (cm) 0 cm 08/09/21 1012   Undermining Ends___ O'Clock 0 07/12/21 0943   Undermining Maxium Distance (cm) 0 08/09/21 1012   Wound Assessment Pink/red;Fibrin 08/09/21 1012   Drainage Amount Moderate 08/09/21 1012   Drainage Description Brown 08/09/21 1012   Odor Mild 08/09/21 1012   Kiah-wound Assessment Intact 08/09/21 1012   Margins Defined edges 08/09/21 1012   Wound Thickness Description not for Pressure Injury Full thickness 08/09/21 1012   Number of days: 97       Wound 05/03/21 Leg Lower;Right;Posterior #3 (Active)   Wound Image   07/26/21 1023   Wound Etiology Venous 05/03/21 1120   Wound Cleansed Cleansed with saline 08/09/21 1012   Dressing/Treatment Collagen 08/09/21 1041   Wound Length (cm) 2 cm 08/09/21 1012   Wound Width (cm) 2.5 cm 08/09/21 1012   Wound Depth (cm) 0.1 cm 08/09/21 1012   Wound Surface Area (cm^2) 5 cm^2 08/09/21 1012   Change in Wound Size % (l*w) 84.62 08/09/21 1012   Wound Volume (cm^3) 0.5 cm^3 08/09/21 1012   Wound Healing % 85 08/09/21 1012   Post-Procedure Length (cm) 2.1 cm 08/09/21 1041   Post-Procedure Width (cm) 2.6 cm 08/09/21 1041   Post-Procedure Depth (cm) 0.3 cm 08/09/21 1041   Post-Procedure Surface Area (cm^2) 5.46 cm^2 08/09/21 1041   Post-Procedure Volume (cm^3) 1.638 cm^3 08/09/21 1041   Distance Tunneling (cm) 0 cm 08/09/21 1012   Undermining Maxium Distance (cm) 0 08/09/21 1012   Wound Assessment Fibrin;Pink/red 08/09/21 1012   Drainage Amount Moderate 08/09/21 1012   Drainage Description Brown 08/09/21 1012   Odor Mild 08/09/21 1012   Kiah-wound Assessment Intact 08/09/21 1012   Margins Defined edges 08/09/21 1012   Wound Thickness Description not for Pressure Injury Full thickness 08/09/21 1012   Number of days: 97       Wound 08/02/21 Leg Anterior; Lateral;Right #4 (Active)   Wound Image   08/02/21 0930   Wound Cleansed Cleansed with saline 08/09/21 1012   Dressing/Treatment Collagen 08/09/21 1041   Wound Length (cm) 1.7 cm 08/09/21 1012   Wound Width (cm) 1.7 cm 08/09/21 1012   Wound Depth (cm) 0.1 cm 08/09/21 1012   Wound Surface Area (cm^2) 2.89 cm^2 08/09/21 1012   Change in Wound Size % (l*w) -21.43 08/09/21 1012   Wound Volume (cm^3) 0.289 cm^3 08/09/21 1012   Wound Healing % -21 08/09/21 1012   Post-Procedure Length (cm) 1.8 cm 08/09/21 1041   Post-Procedure Width (cm) 1.8 cm 08/09/21 1041   Post-Procedure Depth (cm) 0.3 cm 08/09/21 1041   Post-Procedure Surface Area (cm^2) 3.24 cm^2 08/09/21 1041   Post-Procedure Volume (cm^3) 0.972 cm^3 08/09/21 1041   Distance Tunneling (cm) 0 cm 08/09/21 1012   Undermining Maxium Distance (cm) 0 08/09/21 1012   Wound Assessment Purple/maroon;Fibrin 08/09/21 1012   Drainage Amount Moderate 08/09/21 1012   Drainage Description Brown 08/09/21 1012   Odor Mild 08/09/21 1012   Kiah-wound Assessment Intact 08/09/21 1012   Margins Defined edges 08/09/21 1012   Wound Thickness Description not for Pressure Injury Full thickness 08/09/21 1012   Number of days: 7          Percent of Wound Debrided: 100%    Total Surface Area Debrided:  22 sq cm    Diabetic/Pressure/Non Pressure Ulcers only:  Ulcer: Non-Pressure ulcer, fat layer exposed    Bleeding: Minimal    Hemostasis Achieved: by pressure    Procedural Pain: 0  / 10     Post Procedural Pain: 0 / 10     Response to treatment:  Well tolerated by patient.          Plan:     Treatment Note: Please see attached Discharge Instructions. These instructions were given and signed by the patient or POA    New Medication(s) at this visit:   New Prescriptions    No medications on file       Other orders at this visit:   Orders Placed This Encounter   Procedures   [id number] [address] Protocol       Discharge Instructions          [facility] Physician Orders and Discharge Instructions  [address] [address] [address] [address] [facility]. [facility]. [facility]  Telephone: [phone] hands with soap and water prior to and after every dressing change. Wound Cleansing: (All wounds are cleaned with 0.9% saline during your wound care visit)   · Do not scrub or use excessive force. · With each dressing change, rinse wounds with 0.9% Saline. (May use wound wash or soft contact solution. Both can be purchased at a local drug store). · If unable to obtain saline, may use a gentle soap and water. · Keep wounds dry in the shower unless otherwise instructed by the physician. · For wounds on lower legs, cast covers can be purchased at local drug stores, so that you may shower and keep the wound(s) dry. [] Instructions for Vashe Wash solution ONLY: Apply enough Vashe to soak a piece of gauze and place on wound bed for 5-10 minutes. DO NOT rinse after Vashe has been applied. Follow dressing application as instructed below. [x] Vashe Wash applied during clinic visit.     Kiah wound Topical Treatments:  Do not apply lotions, creams, or ointments to the skin around the wound bed unless directed as followed:     [] Apply around the wound: [] moisturizing lotion [] Antifungal ointment [] No-Sting barrier film [] Zinc paste [] Other:       Dressings:           Wound Location: right lower leg    Apply Primary Dressing to wound:       Collagen      Cover and Secure with:     Cover and Secure with: 4X4 gauze pad   Avoid contact of tape with skin if Appointment for a Wound Assessment with a nurse on:     [x]DME/Wound Dressing Supplies ordered at this visit: []Yes []No  o Supplies Provided by:   o Please call them directly to reorder supplies when you run out.  o CONTINUE TO USE THE SUPPLIES YOU HAVE AVAILABLE. IT IS MOST IMPORTANT TO KEEP THE WOUND COVERED AT ALL TIMES. [x] Orders placed during your visit:   Orders Placed This Encounter   Procedures    Initiate Outpatient Wound Care Protocol       Your nurse  is:  Ly Dash     Electronically signed by Shani Amaro RN on 8/9/2021 at 10:43 AM     215 [facility] Information: Should you experience any significant changes in your wound(s) or have questions about your wound care, please contact the [address] [place] [address] at [phone]. We are open from 8:00am - 4:30p Monday thru Friday except for Wednesdays which we are closed. Please give us 24-48 hours to return your call. Call your doctor now or seek immediate medical care if:    · You have symptoms of infection, such as:  ? Increased pain, swelling, warmth, or redness. ? Red streaks leading from the area. ? Pus draining from the area. ? A fever.         Physician for this visit and orders: Elvia Curling, MD    [] Patient unable to sign Discharge Instructions given to ECF/Transportation/POA        Electronically signed by Elvia Curling, MD on 8/9/2021 at 10:48 AM

## 2021-08-09 NOTE — PROGRESS NOTES
Multilayer Compression Wrap   (Not Unna) Below the Knee    NAME:  Patricia Figueroa  YOB: 1965  MEDICAL RECORD NUMBER:  [id number]  DATE:  8/9/2021        Removed old Multilayer wrap if present and washed leg with mild soap/water.   Applied moisturizing agent to dry skin as needed.   Applied primary and secondary dressing as ordered     Applied multilayered dressing below the knee to Right lower leg(s)  (4 Layer Compression Wrap ) .   Instructed patient/caregiver not to remove dressing and to keep it clean and dry.   Instructed patient/caregiver on complications to report to provider, such as pain, numbness in toes, heavy drainage, and slippage of dressing.   Instructed patient on purpose of compression dressing and on activity and exercise recommendations.     Applied per   Guidelines    Electronically signed by Hima Salazar RN on 8/9/2021 at 10:47 AM

## 2021-08-16 ENCOUNTER — HOSPITAL ENCOUNTER (OUTPATIENT)
Dept: WOUND CARE | Age: 56
Discharge: HOME | End: 2021-08-16
Payer: COMMERCIAL

## 2021-08-16 VITALS
RESPIRATION RATE: 18 BRPM | HEART RATE: 76 BPM | SYSTOLIC BLOOD PRESSURE: 151 MMHG | TEMPERATURE: 97.5 F | DIASTOLIC BLOOD PRESSURE: 69 MMHG

## 2021-08-16 DIAGNOSIS — L97.919 IDIOPATHIC CHRONIC VENOUS HYPERTENSION OF RIGHT LOWER EXTREMITY WITH ULCER (HCC): ICD-10-CM

## 2021-08-16 DIAGNOSIS — I82.423 THROMBOSIS OF BOTH ILIAC VEINS (HCC): ICD-10-CM

## 2021-08-16 DIAGNOSIS — I87.311 IDIOPATHIC CHRONIC VENOUS HYPERTENSION OF RIGHT LOWER EXTREMITY WITH ULCER (HCC): ICD-10-CM

## 2021-08-16 DIAGNOSIS — Z79.01 CHRONIC ANTICOAGULATION: ICD-10-CM

## 2021-08-16 DIAGNOSIS — I89.0 CHRONIC ACQUIRED LYMPHEDEMA: ICD-10-CM

## 2021-08-16 DIAGNOSIS — I82.5Z1 CHRONIC VENOUS EMBOLISM AND THROMBOSIS OF DEEP VESSELS OF DISTAL END OF RIGHT LOWER EXTREMITY (HCC): ICD-10-CM

## 2021-08-16 DIAGNOSIS — I82.220 THROMBOSIS OF INFERIOR VENA CAVA (HCC): ICD-10-CM

## 2021-08-16 DIAGNOSIS — Z91.199 NONCOMPLIANCE: Primary | ICD-10-CM

## 2021-08-16 PROCEDURE — 6370000000 HC RX 637 (ALT 250 FOR IP): Performed by: SPECIALIST

## 2021-08-16 PROCEDURE — 11042 DBRDMT SUBQ TIS 1ST 20SQCM/<: CPT

## 2021-08-16 PROCEDURE — 11042 DBRDMT SUBQ TIS 1ST 20SQCM/<: CPT | Performed by: SPECIALIST

## 2021-08-16 PROCEDURE — 29581 APPL MULTLAYER CMPRN SYS LEG: CPT

## 2021-08-16 RX ORDER — LIDOCAINE HYDROCHLORIDE 20 MG/ML
JELLY TOPICAL ONCE
Status: CANCELLED | OUTPATIENT
Start: 2021-08-16 | End: 2021-08-16

## 2021-08-16 RX ORDER — LIDOCAINE 40 MG/G
CREAM TOPICAL ONCE
Status: CANCELLED | OUTPATIENT
Start: 2021-08-16 | End: 2021-08-16

## 2021-08-16 RX ORDER — BACITRACIN ZINC AND POLYMYXIN B SULFATE 500; 1000 [USP'U]/G; [USP'U]/G
OINTMENT TOPICAL ONCE
Status: CANCELLED | OUTPATIENT
Start: 2021-08-16 | End: 2021-08-16

## 2021-08-16 RX ORDER — WARFARIN SODIUM 5 MG/1
TABLET ORAL
Qty: 48 TABLET | Refills: 0 | Status: SHIPPED | OUTPATIENT
Start: 2021-08-16 | End: 2021-09-13

## 2021-08-16 RX ORDER — LIDOCAINE 50 MG/G
OINTMENT TOPICAL ONCE
Status: CANCELLED | OUTPATIENT
Start: 2021-08-16 | End: 2021-08-16

## 2021-08-16 RX ORDER — BETAMETHASONE DIPROPIONATE 0.05 %
OINTMENT (GRAM) TOPICAL ONCE
Status: CANCELLED | OUTPATIENT
Start: 2021-08-16 | End: 2021-08-16

## 2021-08-16 RX ORDER — GINSENG 100 MG
CAPSULE ORAL ONCE
Status: CANCELLED | OUTPATIENT
Start: 2021-08-16 | End: 2021-08-16

## 2021-08-16 RX ORDER — LIDOCAINE HYDROCHLORIDE 40 MG/ML
SOLUTION TOPICAL ONCE
Status: CANCELLED | OUTPATIENT
Start: 2021-08-16 | End: 2021-08-16

## 2021-08-16 RX ORDER — LIDOCAINE HYDROCHLORIDE 40 MG/ML
SOLUTION TOPICAL ONCE
Status: COMPLETED | OUTPATIENT
Start: 2021-08-16 | End: 2021-08-16

## 2021-08-16 RX ORDER — GENTAMICIN SULFATE 1 MG/G
OINTMENT TOPICAL ONCE
Status: CANCELLED | OUTPATIENT
Start: 2021-08-16 | End: 2021-08-16

## 2021-08-16 RX ORDER — CLOBETASOL PROPIONATE 0.5 MG/G
OINTMENT TOPICAL ONCE
Status: CANCELLED | OUTPATIENT
Start: 2021-08-16 | End: 2021-08-16

## 2021-08-16 RX ORDER — BACITRACIN, NEOMYCIN, POLYMYXIN B 400; 3.5; 5 [USP'U]/G; MG/G; [USP'U]/G
OINTMENT TOPICAL ONCE
Status: CANCELLED | OUTPATIENT
Start: 2021-08-16 | End: 2021-08-16

## 2021-08-16 RX ADMIN — LIDOCAINE HYDROCHLORIDE: 40 SOLUTION TOPICAL at 10:02

## 2021-08-16 ASSESSMENT — PAIN DESCRIPTION - FREQUENCY: FREQUENCY: INTERMITTENT

## 2021-08-16 ASSESSMENT — PAIN DESCRIPTION - ORIENTATION: ORIENTATION: RIGHT

## 2021-08-16 ASSESSMENT — PAIN DESCRIPTION - PAIN TYPE: TYPE: CHRONIC PAIN

## 2021-08-16 ASSESSMENT — PAIN DESCRIPTION - DESCRIPTORS: DESCRIPTORS: ACHING

## 2021-08-16 ASSESSMENT — PAIN DESCRIPTION - LOCATION: LOCATION: LEG

## 2021-08-16 ASSESSMENT — PAIN SCALES - GENERAL: PAINLEVEL_OUTOF10: 2

## 2021-08-16 NOTE — PROGRESS NOTES
Multilayer Compression Wrap   (Not Unna) Below the Knee    NAME:  Javier Carlson  YOB: 1965  MEDICAL RECORD NUMBER:  [id number]  DATE:  8/16/2021        Removed old Multilayer wrap if present and washed leg with mild soap/water.   Applied moisturizing agent to dry skin as needed.   Applied primary and secondary dressing as ordered     Applied multilayered dressing below the knee to Left lower leg(s)  (4 Layer Compression Wrap ) .   Instructed patient/caregiver not to remove dressing and to keep it clean and dry.   Instructed patient/caregiver on complications to report to provider, such as pain, numbness in toes, heavy drainage, and slippage of dressing.   Instructed patient on purpose of compression dressing and on activity and exercise recommendations.     Applied per   Guidelines    Electronically signed by Juan Eisenberg RN on 8/16/2021 at 10:20 AM

## 2021-08-16 NOTE — PROGRESS NOTES
[address] [facility] [place]  Progress Note and Procedure Note      Maykel Bernal  MEDICAL RECORD NUMBER:  [id number]  AGE: 64 y.o. GENDER: male  : 1965  EPISODE DATE:  2021    Subjective:     Chief Complaint   Patient presents with    Wound Check     right leg         HISTORY of PRESENT ILLNESS HPI     Maykel Bernal is a 64 y.o. male who presents today for wound/ulcer evaluation. History of Wound Context:Continues follow-up for his chronic venous insufficiency with open wounds and lymphedema.  He has finished the oral antibiotics as prescribed by the infectious disease doctor. [facility] states there is less pain and drainage from the right lower extremity.  He claims to be using his lymphedema pumps  He has gone the entire week without having to have his compression wrap changed. Pain Assessment:  Wound/Ulcer Pain Timing/Severity: none  Quality of pain: N/A  Severity:  0 / 10   Modifying Factors: None  Associated Signs/Symptoms: edema    Ulcer Identification:  Ulcer Type: venous  Contributing Factors: edema, venous stasis, lymphedema, obesity and anticoagulation therapy    Objective:      BP (!) 151/69   Pulse 76   Temp 97.5 °F (36.4 °C) (Temporal)   Resp 18     Wt Readings from Last 3 Encounters:   21 243 lb 2.7 oz (110.3 kg)   21 242 lb 3.2 oz (109.9 kg)   21 243 lb (110.2 kg)       PHYSICAL EXAM    Extremities: no cyanosis, no clubbing, 2 + edema-   right lower extremity with full-thickness wounds containing fibrin, slough, granulation and epithelialization at the margins at the anterior medial lateral and posterior knee all of which measuring smaller    Assessment:      1. Noncompliance    2. Chronic acquired lymphedema    3.  Idiopathic chronic venous hypertension of right lower extremity with ulcer ([facility] [address]         Procedure Note  Indications:  Based on my examination of this patient's wound(s) today, sharp excision is required to promote healing and evaluate the extent healing. Performed by: Eden Corbin MD    Consent obtained? Yes    Time out taken: Yes    Pain Control: Anesthetic: 4% Lidocaine Liquid Topical     Debridement:Excisional Debridement    Using curette the wound was sharply debrided    down through and including the removal of  epidermis, dermis and subcutaneous tissue. Devitalized Tissue Debrided:  fibrin, biofilm and slough      Pre Debridement Measurements:  Are located in the Wound Documentation Flow Sheet   Wound #: 1, 2, 3 and 4     Post  Debridement Measurements:  Wound 05/03/21 Leg Right;Medial;Lower #1 (Active)   Wound Image   07/26/21 1023   Wound Etiology Venous 05/03/21 1120   Wound Cleansed Cleansed with saline 08/16/21 0956   Dressing/Treatment Collagen 08/16/21 1012   Wound Length (cm) 3 cm 08/16/21 0956   Wound Width (cm) 3 cm 08/16/21 0956   Wound Depth (cm) 0.1 cm 08/16/21 0956   Wound Surface Area (cm^2) 9 cm^2 08/16/21 0956   Change in Wound Size % (l*w) 75.58 08/16/21 0956   Wound Volume (cm^3) 0.9 cm^3 08/16/21 0956   Wound Healing % 76 08/16/21 0956   Post-Procedure Length (cm) 3.1 cm 08/16/21 1012   Post-Procedure Width (cm) 3.1 cm 08/16/21 1012   Post-Procedure Depth (cm) 0.3 cm 08/16/21 1012   Post-Procedure Surface Area (cm^2) 9.61 cm^2 08/16/21 1012   Post-Procedure Volume (cm^3) 2.883 cm^3 08/16/21 1012   Distance Tunneling (cm) 0 cm 08/16/21 0956   Undermining Maxium Distance (cm) 0 08/16/21 0956   Wound Assessment Pink/red;Fibrin 08/16/21 0956   Drainage Amount Moderate 08/16/21 0956   Drainage Description Brown 08/16/21 0956   Odor Mild 08/16/21 0956   Kiah-wound Assessment Intact 08/16/21 0956   Margins Defined edges 08/16/21 0956   Wound Thickness Description not for Pressure Injury Full thickness 08/16/21 0956   Number of days: 104       Wound 05/03/21 Leg Anterior; Lower;Right #2  (Active)   Wound Image   07/26/21 1023   Wound Etiology Venous 05/03/21 1120   Wound Cleansed Cleansed with saline 08/16/21 0976 Dressing/Treatment Collagen 08/16/21 1012   Wound Length (cm) 1.8 cm 08/16/21 0956   Wound Width (cm) 2 cm 08/16/21 0956   Wound Depth (cm) 0.1 cm 08/16/21 0956   Wound Surface Area (cm^2) 3.6 cm^2 08/16/21 0956   Change in Wound Size % (l*w) 97.93 08/16/21 0956   Wound Volume (cm^3) 0.36 cm^3 08/16/21 0956   Wound Healing % 98 08/16/21 0956   Post-Procedure Length (cm) 1.9 cm 08/16/21 1012   Post-Procedure Width (cm) 2.1 cm 08/16/21 1012   Post-Procedure Depth (cm) 0.3 cm 08/16/21 1012   Post-Procedure Surface Area (cm^2) 3.99 cm^2 08/16/21 1012   Post-Procedure Volume (cm^3) 1.197 cm^3 08/16/21 1012   Distance Tunneling (cm) 0 cm 08/16/21 0956   Undermining Ends___ O'Clock 0 07/12/21 0943   Undermining Maxium Distance (cm) 0 08/16/21 0956   Wound Assessment Pink/red;Fibrin; Other (Comment) 08/16/21 0956   Drainage Amount Moderate 08/16/21 0956   Drainage Description Brown 08/16/21 0956   Odor Mild 08/16/21 0956   Kiah-wound Assessment Intact 08/16/21 0956   Margins Defined edges 08/16/21 0956   Wound Thickness Description not for Pressure Injury Full thickness 08/16/21 0956   Number of days: 104       Wound 05/03/21 Leg Lower;Right;Posterior #3 (Active)   Wound Image   07/26/21 1023   Wound Etiology Venous 05/03/21 1120   Wound Cleansed Cleansed with saline 08/16/21 0956   Dressing/Treatment Collagen 08/16/21 1012   Wound Length (cm) 1.9 cm 08/16/21 0956   Wound Width (cm) 1.9 cm 08/16/21 0956   Wound Depth (cm) 0.1 cm 08/16/21 0956   Wound Surface Area (cm^2) 3.61 cm^2 08/16/21 0956   Change in Wound Size % (l*w) 88.89 08/16/21 0956   Wound Volume (cm^3) 0.361 cm^3 08/16/21 0956   Wound Healing % 89 08/16/21 0956   Post-Procedure Length (cm) 2 cm 08/16/21 1012   Post-Procedure Width (cm) 2 cm 08/16/21 1012   Post-Procedure Depth (cm) 0.3 cm 08/16/21 1012   Post-Procedure Surface Area (cm^2) 4 cm^2 08/16/21 1012   Post-Procedure Volume (cm^3) 1.2 cm^3 08/16/21 1012   Distance Tunneling (cm) 0 cm 08/16/21 0956 Undermining Maxium Distance (cm) 0 08/16/21 0956   Wound Assessment Fibrin;Pink/red 08/16/21 0956   Drainage Amount Moderate 08/16/21 0956   Drainage Description Brown 08/16/21 0956   Odor Mild 08/16/21 0956   Kiah-wound Assessment Intact 08/16/21 0956   Margins Defined edges 08/16/21 0956   Wound Thickness Description not for Pressure Injury Full thickness 08/09/21 1012   Number of days: 104       Wound 08/02/21 Leg Anterior; Lateral;Right #4 (Active)   Wound Image   08/02/21 0930   Wound Cleansed Cleansed with saline 08/16/21 0956   Dressing/Treatment Collagen 08/16/21 1012   Wound Length (cm) 1.5 cm 08/16/21 0956   Wound Width (cm) 1.5 cm 08/16/21 0956   Wound Depth (cm) 0.1 cm 08/16/21 0956   Wound Surface Area (cm^2) 2.25 cm^2 08/16/21 0956   Change in Wound Size % (l*w) 5.46 08/16/21 0956   Wound Volume (cm^3) 0.225 cm^3 08/16/21 0956   Wound Healing % 5 08/16/21 0956   Post-Procedure Length (cm) 1.6 cm 08/16/21 1012   Post-Procedure Width (cm) 1.6 cm 08/16/21 1012   Post-Procedure Depth (cm) 0.3 cm 08/16/21 1012   Post-Procedure Surface Area (cm^2) 2.56 cm^2 08/16/21 1012   Post-Procedure Volume (cm^3) 0.768 cm^3 08/16/21 1012   Distance Tunneling (cm) 0 cm 08/16/21 0956   Undermining Maxium Distance (cm) 0 08/16/21 0956   Wound Assessment Pink/red;Slough 08/16/21 0956   Drainage Amount Moderate 08/16/21 0956   Drainage Description Brown 08/16/21 0956   Odor Mild 08/16/21 0956   Kiah-wound Assessment Intact 08/16/21 0956   Margins Defined edges 08/16/21 0956   Wound Thickness Description not for Pressure Injury Full thickness 08/16/21 0956   Number of days: 14          Percent of Wound Debrided: 100%    Total Surface Area Debrided:  20 sq cm    Diabetic/Pressure/Non Pressure Ulcers only:  Ulcer: Non-Pressure ulcer, fat layer exposed    Bleeding: Minimal    Hemostasis Achieved: by pressure    Procedural Pain: 0  / 10     Post Procedural Pain: 0 / 10     Response to treatment:  Well tolerated by patient. Wound measurements continue to slowly improve        Plan:     Treatment Note: Please see attached Discharge Instructions. These instructions were given and signed by the patient or POA    New Medication(s) at this visit:   New Prescriptions    No medications on file       Other orders at this visit:   Orders Placed This Encounter   Procedures   [id number] [address] Protocol       Discharge Instructions          [facility] Physician Orders and Discharge Instructions  [address] [address] [address] [address] [facility]  Telephone: [phone] hands with soap and water prior to and after every dressing change. Wound Cleansing: (All wounds are cleaned with 0.9% saline during your wound care visit)   · Do not scrub or use excessive force. · With each dressing change, rinse wounds with 0.9% Saline. (May use wound wash or soft contact solution. Both can be purchased at a local drug store). · If unable to obtain saline, may use a gentle soap and water. · Keep wounds dry in the shower unless otherwise instructed by the physician. · For wounds on lower legs, cast covers can be purchased at local drug stores, so that you may shower and keep the wound(s) dry. [] Instructions for Vashe Wash solution ONLY: Apply enough Vashe to soak a piece of gauze and place on wound bed for 5-10 minutes. DO NOT rinse after Vashe has been applied. Follow dressing application as instructed below. [x] Vashe Wash applied during clinic visit.     Kiah wound Topical Treatments:  Do not apply lotions, creams, or ointments to the skin around the wound bed unless directed as followed:     [] Apply around the wound: [] moisturizing lotion [] Antifungal ointment [] No-Sting barrier film [] Zinc paste [] Other:       Dressings:           Wound Location: right lower leg       Apply Primary Dressing to wound:       Collagen      Cover and Secure with:     Cover and Secure with: 4X4 gauze pad   Avoid contact of tape with skin if possible.  When to change Dressing: [] Daily [] Every Other Day [] Once a week  [] Three times per week: [] Monday, Wednesday, Friday [] Tuesday, Thursday, Saturday  [x] Do Not Change Dressing [] Other:        Edema Control:  Apply: [x] Compression Stocking  [x] Left Lower Leg [] Right Lower Leg        Apply every morning immediately when getting up. They should be applied to affected leg(s) from mid foot to knee making sure to cover the heel. Remove every night before going to bed. [x] Elevate leg(s) above the level of the heart for 30 minutes 4-5 times a day and/or when sitting. [x] Avoid prolonged standing in one place. Multilayer Compression Wrap:    Type: 4 Layer Compression Wrap   · Applied in Clinic to the :  Right lower leg(s) on 8/16/2021  · Do not get leg(s) with wrap wet. · If wraps become too tight call the center or completely remove the wrap. · Elevate leg(s) above the level of the heart when sitting. · Avoid prolonged standing in one place. Dietary:  Important dietary reminders:  1. Increase Protein intake (i.e. Lean meats, fish, eggs, legumes, and yogurt)  2. No added salt  3. If diabetic, follow a diabetic diet and check glucose prior to meals or as instructed by your physician. If you are still having pain after you go home:   For wounds on lower legs or arms, elevate the affected limb.  Use over-the-counter medications you would normally use for pain as permitted by your primary care doctor.  For persistent pain not relieved by the above interventions, please call your primary care doctor.      Return Appointment:   Return Appointment: With Dr. Skylar Nguyen  in  [address] [place] [address])  o Scheduled weekly until (Date):      [] Return Appointment for a Wound Assessment with a nurse on:     [x]DME/Wound Dressing Supplies ordered at this visit: []Yes []No  o Supplies Provided by:   o Please call them directly to reorder supplies when you run out.  o CONTINUE TO USE THE SUPPLIES YOU HAVE AVAILABLE. IT IS MOST IMPORTANT TO KEEP THE WOUND COVERED AT ALL TIMES. [x] Orders placed during your visit:   Orders Placed This Encounter   Procedures    Initiate Outpatient Wound Care Protocol       Your nurse  is:  Yue Ramires     Electronically signed by Romina Daniels RN on 8/16/2021 at [address] Information: Should you experience any significant changes in your wound(s) or have questions about your wound care, please contact the [address] [place] [address] at [phone]. We are open from 8:00am - 4:30p Monday thru Friday except for Wednesdays which we are closed. Please give us 24-48 hours to return your call. Call your doctor now or seek immediate medical care if:    · You have symptoms of infection, such as:  ? Increased pain, swelling, warmth, or redness. ? Red streaks leading from the area. ? Pus draining from the area. ? A fever.         Physician for this visit and orders: Darlene Biggs MD    [] Patient unable to sign Discharge Instructions given to ECF/Transportation/POA        Electronically signed by Darlene Biggs MD on 8/16/2021 at 10:22 AM

## 2021-08-16 NOTE — TELEPHONE ENCOUNTER
Requested Prescriptions     Pending Prescriptions Disp Refills    warfarin (COUMADIN) 5 MG tablet [Pharmacy Med Name: WARFARIN SODIUM 5 MG TABLET] 48 tablet 0     Sig: TAKE 1 AND 1/2 TABLET BY MOUTH DAILY EXCEPT TAKE 2 TABLETS ON FRIDAY     Last ov 12/14/185  Last lab 7/20/18  Next ov 8/16/21

## 2021-08-23 ENCOUNTER — HOSPITAL ENCOUNTER (OUTPATIENT)
Dept: WOUND CARE | Age: 56
Discharge: HOME | End: 2021-08-23
Payer: COMMERCIAL

## 2021-08-23 VITALS
HEART RATE: 69 BPM | SYSTOLIC BLOOD PRESSURE: 155 MMHG | DIASTOLIC BLOOD PRESSURE: 89 MMHG | TEMPERATURE: 98 F | RESPIRATION RATE: 18 BRPM

## 2021-08-23 DIAGNOSIS — Z91.199 NONCOMPLIANCE: Primary | ICD-10-CM

## 2021-08-23 DIAGNOSIS — I89.0 CHRONIC ACQUIRED LYMPHEDEMA: ICD-10-CM

## 2021-08-23 DIAGNOSIS — L97.919 IDIOPATHIC CHRONIC VENOUS HYPERTENSION OF RIGHT LOWER EXTREMITY WITH ULCER (HCC): ICD-10-CM

## 2021-08-23 DIAGNOSIS — I87.311 IDIOPATHIC CHRONIC VENOUS HYPERTENSION OF RIGHT LOWER EXTREMITY WITH ULCER (HCC): ICD-10-CM

## 2021-08-23 PROCEDURE — 6370000000 HC RX 637 (ALT 250 FOR IP): Performed by: SPECIALIST

## 2021-08-23 PROCEDURE — 11042 DBRDMT SUBQ TIS 1ST 20SQCM/<: CPT

## 2021-08-23 PROCEDURE — 11042 DBRDMT SUBQ TIS 1ST 20SQCM/<: CPT | Performed by: SPECIALIST

## 2021-08-23 PROCEDURE — 29581 APPL MULTLAYER CMPRN SYS LEG: CPT

## 2021-08-23 RX ORDER — LIDOCAINE 50 MG/G
OINTMENT TOPICAL ONCE
Status: CANCELLED | OUTPATIENT
Start: 2021-08-23 | End: 2021-08-23

## 2021-08-23 RX ORDER — LIDOCAINE HYDROCHLORIDE 20 MG/ML
JELLY TOPICAL ONCE
Status: CANCELLED | OUTPATIENT
Start: 2021-08-23 | End: 2021-08-23

## 2021-08-23 RX ORDER — LIDOCAINE 40 MG/G
CREAM TOPICAL ONCE
Status: CANCELLED | OUTPATIENT
Start: 2021-08-23 | End: 2021-08-23

## 2021-08-23 RX ORDER — CLOBETASOL PROPIONATE 0.5 MG/G
OINTMENT TOPICAL ONCE
Status: CANCELLED | OUTPATIENT
Start: 2021-08-23 | End: 2021-08-23

## 2021-08-23 RX ORDER — GENTAMICIN SULFATE 1 MG/G
OINTMENT TOPICAL ONCE
Status: CANCELLED | OUTPATIENT
Start: 2021-08-23 | End: 2021-08-23

## 2021-08-23 RX ORDER — BACITRACIN ZINC AND POLYMYXIN B SULFATE 500; 1000 [USP'U]/G; [USP'U]/G
OINTMENT TOPICAL ONCE
Status: CANCELLED | OUTPATIENT
Start: 2021-08-23 | End: 2021-08-23

## 2021-08-23 RX ORDER — BETAMETHASONE DIPROPIONATE 0.05 %
OINTMENT (GRAM) TOPICAL ONCE
Status: CANCELLED | OUTPATIENT
Start: 2021-08-23 | End: 2021-08-23

## 2021-08-23 RX ORDER — LIDOCAINE HYDROCHLORIDE 40 MG/ML
SOLUTION TOPICAL ONCE
Status: COMPLETED | OUTPATIENT
Start: 2021-08-23 | End: 2021-08-23

## 2021-08-23 RX ORDER — BACITRACIN, NEOMYCIN, POLYMYXIN B 400; 3.5; 5 [USP'U]/G; MG/G; [USP'U]/G
OINTMENT TOPICAL ONCE
Status: CANCELLED | OUTPATIENT
Start: 2021-08-23 | End: 2021-08-23

## 2021-08-23 RX ORDER — GINSENG 100 MG
CAPSULE ORAL ONCE
Status: CANCELLED | OUTPATIENT
Start: 2021-08-23 | End: 2021-08-23

## 2021-08-23 RX ORDER — LIDOCAINE HYDROCHLORIDE 40 MG/ML
SOLUTION TOPICAL ONCE
Status: CANCELLED | OUTPATIENT
Start: 2021-08-23 | End: 2021-08-23

## 2021-08-23 RX ADMIN — LIDOCAINE HYDROCHLORIDE: 40 SOLUTION TOPICAL at 10:02

## 2021-08-23 ASSESSMENT — PAIN DESCRIPTION - PAIN TYPE: TYPE: CHRONIC PAIN

## 2021-08-23 ASSESSMENT — PAIN DESCRIPTION - LOCATION: LOCATION: LEG

## 2021-08-23 ASSESSMENT — PAIN SCALES - GENERAL: PAINLEVEL_OUTOF10: 2

## 2021-08-23 ASSESSMENT — PAIN DESCRIPTION - DESCRIPTORS: DESCRIPTORS: ACHING

## 2021-08-23 ASSESSMENT — PAIN DESCRIPTION - ORIENTATION: ORIENTATION: RIGHT

## 2021-08-23 NOTE — PROGRESS NOTES
Multilayer Compression Wrap   (Not Unna) Below the Knee    NAME:  Favio Wilkerson  YOB: 1965  MEDICAL RECORD NUMBER:  [id number]  DATE:  8/23/2021        Removed old Multilayer wrap if present and washed leg with mild soap/water.   Applied moisturizing agent to dry skin as needed.   Applied primary and secondary dressing as ordered     Applied multilayered dressing below the knee to Right lower leg(s)  (3 Layer Compression Wrap ) .   Instructed patient/caregiver not to remove dressing and to keep it clean and dry.   Instructed patient/caregiver on complications to report to provider, such as pain, numbness in toes, heavy drainage, and slippage of dressing.   Instructed patient on purpose of compression dressing and on activity and exercise recommendations.     Applied per   Guidelines    Electronically signed by Zahida Zarate RN on 8/23/2021 at 10:28 AM

## 2021-08-23 NOTE — PROGRESS NOTES
[facility]  Progress Note and Procedure Note      Alexandr Freedman  MEDICAL RECORD NUMBER:  [id number]  AGE: 64 y.o. GENDER: male  : 1965  EPISODE DATE:  2021    Subjective:     Chief Complaint   Patient presents with    Wound Check     right leg         HISTORY of PRESENT ILLNESS HPI     Alexandr Freedman is a 64 y.o. male who presents today for wound/ulcer evaluation. History of Wound Context: Continues follow-up for his chronic venous insufficiency with open wounds and lymphedema.  He has finished the oral antibiotics as prescribed by the infectious disease doctor. Karen Minor states there is less pain and drainage from the right lower extremity.  He claims to be using his lymphedema pumps  He has gone the entire week without having to have his compression wrap changed.       Pain Assessment:  Wound/Ulcer Pain Timing/Severity: none  Quality of pain: N/A  Severity:  0 / 10   Modifying Factors: None  Associated Signs/Symptoms: edema    Ulcer Identification:  Ulcer Type: venous  Contributing Factors: edema, venous stasis, lymphedema, obesity and anticoagulation therapy    Objective:      BP (!) 155/89   Pulse 69   Temp 98 °F (36.7 °C) (Temporal)   Resp 18     Wt Readings from Last 3 Encounters:   21 243 lb 2.7 oz (110.3 kg)   21 242 lb 3.2 oz (109.9 kg)   21 243 lb (110.2 kg)       PHYSICAL EXAM    Extremities: no cyanosis, no clubbing, 2 + edema-  right extremity with fibrosis, dermal thickening  Four small full-thickness wounds containing fibrin, slough, granulation tissue and epithelialization margins being medial, lateral, anterior and posterior knee  Assessment:      1. Noncompliance    2. Chronic acquired lymphedema    3.  Idiopathic chronic venous hypertension of right lower extremity with ulcer ([facility] [address]         Procedure Note  Indications:  Based on my examination of this patient's wound(s) today, sharp excision is required to promote healing and evaluate the extent healing. Performed by: Marilia Sheikh MD    Consent obtained? Yes    Time out taken: Yes    Pain Control: Anesthetic: 4% Lidocaine Liquid Topical     Debridement:Excisional Debridement    Using curette the wound was sharply debrided    down through and including the removal of  epidermis, dermis and subcutaneous tissue. Devitalized Tissue Debrided:  fibrin and slough      Pre Debridement Measurements:  Are located in the Wound Documentation Flow Sheet   Wound #: 1, 2, 3 and 5     Post  Debridement Measurements:  Wound 05/03/21 Leg Right;Medial;Lower #1 (Active)   Wound Image   07/26/21 1023   Wound Etiology Venous 05/03/21 1120   Wound Cleansed Cleansed with saline 08/23/21 0958   Dressing/Treatment Collagen 08/23/21 1027   Wound Length (cm) 3.3 cm 08/23/21 0958   Wound Width (cm) 2.8 cm 08/23/21 0958   Wound Depth (cm) 0.1 cm 08/23/21 0958   Wound Surface Area (cm^2) 9.24 cm^2 08/23/21 0958   Change in Wound Size % (l*w) 74.93 08/23/21 0958   Wound Volume (cm^3) 0.924 cm^3 08/23/21 0958   Wound Healing % 75 08/23/21 0958   Post-Procedure Length (cm) 3.4 cm 08/23/21 1017   Post-Procedure Width (cm) 2.9 cm 08/23/21 1017   Post-Procedure Depth (cm) 0.3 cm 08/23/21 1017   Post-Procedure Surface Area (cm^2) 9.86 cm^2 08/23/21 1017   Post-Procedure Volume (cm^3) 2. 958 cm^3 08/23/21 1017   Distance Tunneling (cm) 0 cm 08/23/21 0958   Undermining Maxium Distance (cm) 0 08/23/21 0958   Wound Assessment Pink/red;Fibrin 08/23/21 0958   Drainage Amount Small 08/23/21 0958   Drainage Description Brown 08/23/21 0958   Odor Mild 08/23/21 0958   Kiah-wound Assessment Intact 08/23/21 0958   Margins Defined edges 08/23/21 0958   Wound Thickness Description not for Pressure Injury Full thickness 08/23/21 0958   Number of days: 111       Wound 05/03/21 Leg Anterior; Lower;Right #2  (Active)   Wound Image   07/26/21 1023   Wound Etiology Venous 05/03/21 1120   Wound Cleansed Cleansed with saline 08/23/21 0958 Dressing/Treatment Collagen 08/23/21 1027   Wound Length (cm) 1 cm 08/23/21 0958   Wound Width (cm) 1.3 cm 08/23/21 0958   Wound Depth (cm) 0.1 cm 08/23/21 0958   Wound Surface Area (cm^2) 1.3 cm^2 08/23/21 0958   Change in Wound Size % (l*w) 99.25 08/23/21 0958   Wound Volume (cm^3) 0.13 cm^3 08/23/21 0958   Wound Healing % 99 08/23/21 0958   Post-Procedure Length (cm) 1.1 cm 08/23/21 1017   Post-Procedure Width (cm) 1.4 cm 08/23/21 1017   Post-Procedure Depth (cm) 0.3 cm 08/23/21 1017   Post-Procedure Surface Area (cm^2) 1.54 cm^2 08/23/21 1017   Post-Procedure Volume (cm^3) 0.462 cm^3 08/23/21 1017   Distance Tunneling (cm) 0 cm 08/16/21 0956   Undermining Ends___ O'Clock 0 07/12/21 0943   Undermining Maxium Distance (cm) 0 08/16/21 0956   Wound Assessment Pink/red;Fibrin 08/23/21 0958   Drainage Amount Moderate 08/23/21 0958   Drainage Description Brown 08/23/21 0958   Odor Mild 08/23/21 0958   Kiah-wound Assessment Intact 08/23/21 0958   Margins Defined edges 08/23/21 0958   Wound Thickness Description not for Pressure Injury Full thickness 08/23/21 0958   Number of days: 111       Wound 05/03/21 Leg Lower;Right;Posterior #3 (Active)   Wound Image   07/26/21 1023   Wound Etiology Venous 05/03/21 1120   Wound Cleansed Cleansed with saline 08/23/21 0958   Dressing/Treatment Collagen 08/23/21 1027   Wound Length (cm) 1 cm 08/23/21 0958   Wound Width (cm) 2 cm 08/23/21 0958   Wound Depth (cm) 0.1 cm 08/23/21 0958   Wound Surface Area (cm^2) 2 cm^2 08/23/21 0958   Change in Wound Size % (l*w) 93.85 08/23/21 0958   Wound Volume (cm^3) 0.2 cm^3 08/23/21 0958   Wound Healing % 94 08/23/21 0958   Post-Procedure Length (cm) 1.1 cm 08/23/21 1017   Post-Procedure Width (cm) 2.1 cm 08/23/21 1017   Post-Procedure Depth (cm) 0.3 cm 08/23/21 1017   Post-Procedure Surface Area (cm^2) 2.31 cm^2 08/23/21 1017   Post-Procedure Volume (cm^3) 0.693 cm^3 08/23/21 1017   Distance Tunneling (cm) 0 cm 08/23/21 0958   Undermining Maxium Distance (cm) 0 08/23/21 0958   Wound Assessment Fibrin;Pink/red 08/23/21 0958   Drainage Amount Small 08/23/21 0958   Drainage Description Brown 08/23/21 0958   Odor Mild 08/23/21 0958   Kiah-wound Assessment Intact 08/23/21 0958   Margins Defined edges 08/23/21 0958   Wound Thickness Description not for Pressure Injury Full thickness 08/09/21 1012   Number of days: 111       Wound 08/02/21 Leg Anterior; Lateral;Right #4 (Active)   Wound Image   08/02/21 0930   Wound Cleansed Cleansed with saline 08/23/21 0958   Dressing/Treatment Collagen 08/23/21 1027   Wound Length (cm) 1 cm 08/23/21 0958   Wound Width (cm) 1.5 cm 08/23/21 0958   Wound Depth (cm) 0.1 cm 08/23/21 0958   Wound Surface Area (cm^2) 1.5 cm^2 08/23/21 0958   Change in Wound Size % (l*w) 36.97 08/23/21 0958   Wound Volume (cm^3) 0.15 cm^3 08/23/21 0958   Wound Healing % 37 08/23/21 0958   Post-Procedure Length (cm) 1.1 cm 08/23/21 1017   Post-Procedure Width (cm) 1.5 cm 08/23/21 1017   Post-Procedure Depth (cm) 0.3 cm 08/23/21 1017   Post-Procedure Surface Area (cm^2) 1.65 cm^2 08/23/21 1017   Post-Procedure Volume (cm^3) 0.495 cm^3 08/23/21 1017   Distance Tunneling (cm) 0 cm 08/23/21 0958   Undermining Maxium Distance (cm) 0 08/23/21 0958   Wound Assessment Fibrin;Pink/red 08/23/21 0958   Drainage Amount Small 08/23/21 0958   Drainage Description Brown 08/23/21 0958   Odor Mild 08/23/21 0958   Kiah-wound Assessment Intact 08/23/21 0958   Margins Defined edges 08/23/21 0958   Wound Thickness Description not for Pressure Injury Full thickness 08/23/21 0958   Number of days: 21          Percent of Wound Debrided: 100%    Total Surface Area Debrided:  15 sq cm    Diabetic/Pressure/Non Pressure Ulcers only:  Ulcer: Non-Pressure ulcer, fat layer exposed    Bleeding: Minimal    Hemostasis Achieved: by pressure    Procedural Pain: 0  / 10     Post Procedural Pain: 0 / 10     Response to treatment:  Well tolerated by patient.   Wounds continue to show slow epithelialization        Plan:     Treatment Note: Please see attached Discharge Instructions. These instructions were given and signed by the patient or POA    New Medication(s) at this visit:   New Prescriptions    No medications on file       Other orders at this visit:   Orders Placed This Encounter   Procedures   [address] Protocol       Discharge Instructions          [facility] Physician Orders and Discharge Instructions  [address] [address] [address] [address] [facility]. [facility]. [address]  Telephone: [phone] hands with soap and water prior to and after every dressing change. Wound Cleansing: (All wounds are cleaned with 0.9% saline during your wound care visit)   · Do not scrub or use excessive force. · With each dressing change, rinse wounds with 0.9% Saline. (May use wound wash or soft contact solution. Both can be purchased at a local drug store). · If unable to obtain saline, may use a gentle soap and water. · Keep wounds dry in the shower unless otherwise instructed by the physician. · For wounds on lower legs, cast covers can be purchased at local drug stores, so that you may shower and keep the wound(s) dry. [] Instructions for Vashe Wash solution ONLY: Apply enough Vashe to soak a piece of gauze and place on wound bed for 5-10 minutes. DO NOT rinse after Vashe has been applied. Follow dressing application as instructed below. [x] Vashe Wash applied during clinic visit.     Kiah wound Topical Treatments:  Do not apply lotions, creams, or ointments to the skin around the wound bed unless directed as followed:     [] Apply around the wound: [] moisturizing lotion [] Antifungal ointment [] No-Sting barrier film [] Zinc paste [] Other:       Dressings:           Wound Location: right lower leg wound    Apply Primary Dressing to wound:       Collagen      Cover and Secure with:     Cover and Secure with: 4X4 gauze pad   Avoid contact of tape with skin if possible.  When to change Dressing: [] Daily [] Every Other Day [] Once a week  [] Three times per week: [] Monday, Wednesday, Friday [] Tuesday, Thursday, Saturday  [x] Do Not Change Dressing [] Other:       Edema Control:  Apply: [x] Compression Stocking  [x] Left Lower Leg [] Right Lower Leg        Apply every morning immediately when getting up. They should be applied to affected leg(s) from mid foot to knee making sure to cover the heel. Remove every night before going to bed. [x] Elevate leg(s) above the level of the heart for 30 minutes 4-5 times a day and/or when sitting. [x] Avoid prolonged standing in one place. Multilayer Compression Wrap:    Type:3 layer compression wrap  · Applied in Clinic to the :  Right lower leg(s) on 8/23/2021  · Do not get leg(s) with wrap wet. · If wraps become too tight call the center or completely remove the wrap. · Elevate leg(s) above the level of the heart when sitting. · Avoid prolonged standing in one place. Lymphedema Therapy:  [x] Wear Lymphedema pumps twice a day at settings prescribed by your physician. [x]Elevate leg(s) above the level of the heart for 30 minutes 4-5 times a day and/or when sitting. [x] Avoid prolonged standing in one place. Off Loading(Pressure Reduction) When:   []Walking []Sitting []In Bed     Weight Bearing Status:  []Total non-weight bearing:  []Right Leg []Left Leg    []Partial weight bearing  []Right Leg []Left Leg   []Heel weight bearing  []Toe-touch weight bearing  []Transfers Only    []No weight bearing restrictions    []Offloading devices:  []Walker    []Crutches   []Wheelchair   []Knee Scooter  []Surgical/Darco Shoe  []Podus/Prafo  []Cast/Cam Boot  []CROW boot   []Other:  []Heel Protector Soft Boot - DO NOT WALK WHILE WEARING    Total Contact Cast (TCC): []Right []Left   · Total Contact Cast Applied in Clinic today 8/23/2021. · Do not get cast wet.   · Contact streaks leading from the area. ? Pus draining from the area. ? A fever.         Physician for this visit and orders: Ronnie Peoples MD    [] Patient unable to sign Discharge Instructions given to ECF/Transportation/POA        Electronically signed by Ronnie Peoples MD on 8/23/2021 at 10:47 AM

## 2021-08-25 ENCOUNTER — ANTI-COAG VISIT (OUTPATIENT)
Dept: PHARMACY | Age: 56
End: 2021-08-25
Payer: COMMERCIAL

## 2021-08-25 DIAGNOSIS — I82.5Z9 CHRONIC VENOUS EMBOLISM AND THROMBOSIS OF DEEP VESSELS OF DISTAL LOWER EXTREMITY, UNSPECIFIED LATERALITY (HCC): Primary | ICD-10-CM

## 2021-08-25 LAB — INTERNATIONAL NORMALIZATION RATIO, POC: 2.9

## 2021-08-25 PROCEDURE — 85610 PROTHROMBIN TIME: CPT | Performed by: PHARMACIST

## 2021-08-25 PROCEDURE — 99211 OFF/OP EST MAY X REQ PHY/QHP: CPT | Performed by: PHARMACIST

## 2021-08-25 NOTE — PROGRESS NOTES
ANTICOAGULATION SERVICE    Grant Hamilton is a 64 y.o. male with PMHx significant for chronic DVT (last in Jan, 2019) who presents to clinic 8/25/2021 for anticoagulation monitoring and adjustment. Patient has been taking warfarin for 15+ years.      Anticoagulation Indication(s):  DVT    Referring Physician:  Dr. Yamile Wan  Goal INR Range:  2-3  Duration of Anticoagulation Therapy:  Indefinite  Time of day dose taken:  AM  Product patient has at home:  warfarin 5 mg (peach)    INR Summary                            Warfarin regimen (mg)  Date INR   A/P    Sun Mon Tue Wed Thu Fri Sat Mg/wk  8/25 2.9 At goal, continue  7.5 7.5 7.5 7.5 7.5 7.5 7.5 52.5  7/14 2.5 At goal, continue   7.5 7.5 7.5 7.5 7.5 7.5 7.5 52.5  6/14 2.9 At goal, continue  7.5 7.5 7.5 7.5 0/7.5 7.5 7.5 52.5  5/26 3.9 Above goal (ABX), hold x 1 7.5 7.5 7.5 7.5 0/7.5 7.5 7.5 52.5  3/31 2.5 At goal, no change  7.5 7.5 7.5 7.5 7.5 7.5 7.5 52.5  2/17 3.0 At goal, no change  7.5 7.5 7.5 7.5 7.5 7.5 7.5 52.5  1/6 2.8 At goal, no change  7.5 7.5 7.5 7.5 7.5 7.5 7.5 52.5  12/2 2.9 At goal, no change  7.5 7.5 7.5 7.5 7.5 7.5 7.5 52.5  11/4 3.1 Above goal, continue  7.5 7.5 7.5 7.5 7.5 7.5 7.5 52.5  10/7 2.7 At goal, no change  7.5 7.5 7.5 7.5 7.5 7.5 7.5 52.5  9/9 2.7 At goal, no change  7.5 7.5 7.5 7.5 7.5 7.5 7.5 52.5  8/17 2.8 At goal, no change  7.5 7.5 7.5 7.5 7.5 7.5 7.5 52.5  8/3 3.4 Above goal, decrease  7.5 7.5 7.5 7.5 7.5 7.5 7.5 52.5  6/8 2.9 At goal, no change  7.5 7.5 7.5 7.5 7.5 10 7.5 55  2/28 2.7 At goal, no change  7.5 7.5 7.5 7.5 7.5 10 7.5 55  1/3 2.6 At goal, no change  7.5 7.5 7.5 7.5 7.5 10 7.5 55  11/20 2.8 At goal, no change  7.5 7.5 7.5 7.5 7.5 10 7.5 55  10/23 2.6 At goal, no change  7.5 7.5 7.5 7.5 7.5 10 7.5 55  8/23 2.2 At goal, no change  7.5 7.5 7.5 7.5 7.5 10 7.5 55  7/26 2.5 At goal, no change  7.5 7.5 7.5 7.5 7.5 10 7.5 55  6/28 2.3 At goal, no change  7.5 7.5 7.5 7.5 7.5 10 7.5 55  5/31 2.6 At goal, no change  7.5 7.5 7.5 7.5 7.5 10 7.5 55  5/1 2.5 At goal, no change  7.5 7.5 7.5 7.5 7.5 10 7.5 55  4/10 2.0 At goal, no change  7.5 7.5 7.5 7.5 7.5 10 7.5 55  3/13 2.6 At goal, no change  7.5 7.5 7.5 7.5 7.5 10 7.5 55  2/27 2.7 At goal, no change  7.5 7.5 7.5 7.5 7.5 10 7.5 55  2/20 2.9 At goal, no change  7.5 7.5 7.5 7.5 7.5 10 7.5 55  2/13 2.1 At goal, no change  7.5 7.5 7.5 7.5 7.5 10 7.5 55    Last CBC:  Lab Results   Component Value Date    RBC 3.37 (L) 04/28/2018    HGB 8.3 (L) 04/28/2018    HCT 25.4 (L) 04/28/2018    MCV 75.4 (L) 04/28/2018    MCH 24.6 (L) 04/28/2018    MPV 6.8 04/28/2018    RDW 18.3 (H) 04/28/2018     04/28/2018     Patient History:  Recent hospitalizations/HC visits 7/28/21 ID: patient will potentially need IV abx. Continue following with wound care    Recent medication changes Started clindamycin 300 mg TID about 1 month ago   Medications taken regularly that may interact with warfarin or alter INR None reported   Warfarin dose taken as prescribed Yes - does not use pillbox (only takes warfarin)   Signs/symptoms of bleeding No h/o bleeding reported   Vitamin K intake Normally avoids high vitamin K foods: ~0-1 serving per week   Recent vomiting/diarrhea/fever, changes in weight or activity level None reported   Tobacco or alcohol use Patient reports smoking 0 PPD  Patient reports having 0 drinks per day   Upcoming surgeries or procedures None reported     Assessment/Plan:  Patient's INR was therapeutic today (2.9). Patient denies any missed/extra warfarin doses, diet changes, illness, bleeding, etc since last visit. Started on clindamycin 300 mg TID approximately 1 month ago per ID. Patient was instructed to continue 7.5 mg daily for now. Repeat INR in 8 [place]. Patient was reminded to maintain consistent vitamin K intake and call with any bleeding, medication changes, or fever/vomiting/diarrhea.     Patient understands dosing directions and information discussed. Dosing schedule and follow up appointment given to patient. Progress note routed to referring physician's office. Patient acknowledges working in consult agreement with pharmacist as referred by his/her physician. Next Clinic Appointment:  10/25    Please call [facility] at [phone] with any questions. Thanks!   Yadira Dudley, PharmD, [facility]  [facility]  Armando: [phone]  Johnna: [phone]  2021 11:40 AM    For Pharmacy Admin Tracking Only     Intervention Detail: Adherence Monitorin   Total # of Interventions Recommended: 1   Total # of Interventions Accepted: 1   Time Spent (min): 15

## 2021-08-30 ENCOUNTER — HOSPITAL ENCOUNTER (OUTPATIENT)
Dept: WOUND CARE | Age: 56
Discharge: HOME | End: 2021-08-30
Payer: COMMERCIAL

## 2021-08-30 VITALS
RESPIRATION RATE: 18 BRPM | HEART RATE: 72 BPM | DIASTOLIC BLOOD PRESSURE: 92 MMHG | TEMPERATURE: 96.9 F | SYSTOLIC BLOOD PRESSURE: 157 MMHG

## 2021-08-30 DIAGNOSIS — L97.919 IDIOPATHIC CHRONIC VENOUS HYPERTENSION OF RIGHT LOWER EXTREMITY WITH ULCER (HCC): ICD-10-CM

## 2021-08-30 DIAGNOSIS — I87.311 IDIOPATHIC CHRONIC VENOUS HYPERTENSION OF RIGHT LOWER EXTREMITY WITH ULCER (HCC): ICD-10-CM

## 2021-08-30 DIAGNOSIS — Z91.199 NONCOMPLIANCE: Primary | ICD-10-CM

## 2021-08-30 DIAGNOSIS — I89.0 CHRONIC ACQUIRED LYMPHEDEMA: ICD-10-CM

## 2021-08-30 PROCEDURE — 29581 APPL MULTLAYER CMPRN SYS LEG: CPT

## 2021-08-30 PROCEDURE — 11042 DBRDMT SUBQ TIS 1ST 20SQCM/<: CPT | Performed by: SPECIALIST

## 2021-08-30 PROCEDURE — 6370000000 HC RX 637 (ALT 250 FOR IP): Performed by: SPECIALIST

## 2021-08-30 PROCEDURE — 11042 DBRDMT SUBQ TIS 1ST 20SQCM/<: CPT

## 2021-08-30 RX ORDER — BETAMETHASONE DIPROPIONATE 0.05 %
OINTMENT (GRAM) TOPICAL ONCE
Status: CANCELLED | OUTPATIENT
Start: 2021-08-30 | End: 2021-08-30

## 2021-08-30 RX ORDER — CLOBETASOL PROPIONATE 0.5 MG/G
OINTMENT TOPICAL ONCE
Status: CANCELLED | OUTPATIENT
Start: 2021-08-30 | End: 2021-08-30

## 2021-08-30 RX ORDER — GINSENG 100 MG
CAPSULE ORAL ONCE
Status: CANCELLED | OUTPATIENT
Start: 2021-08-30 | End: 2021-08-30

## 2021-08-30 RX ORDER — LIDOCAINE 50 MG/G
OINTMENT TOPICAL ONCE
Status: CANCELLED | OUTPATIENT
Start: 2021-08-30 | End: 2021-08-30

## 2021-08-30 RX ORDER — LIDOCAINE HYDROCHLORIDE 40 MG/ML
SOLUTION TOPICAL ONCE
Status: CANCELLED | OUTPATIENT
Start: 2021-08-30 | End: 2021-08-30

## 2021-08-30 RX ORDER — LIDOCAINE HYDROCHLORIDE 20 MG/ML
JELLY TOPICAL ONCE
Status: CANCELLED | OUTPATIENT
Start: 2021-08-30 | End: 2021-08-30

## 2021-08-30 RX ORDER — BACITRACIN, NEOMYCIN, POLYMYXIN B 400; 3.5; 5 [USP'U]/G; MG/G; [USP'U]/G
OINTMENT TOPICAL ONCE
Status: CANCELLED | OUTPATIENT
Start: 2021-08-30 | End: 2021-08-30

## 2021-08-30 RX ORDER — BACITRACIN ZINC AND POLYMYXIN B SULFATE 500; 1000 [USP'U]/G; [USP'U]/G
OINTMENT TOPICAL ONCE
Status: CANCELLED | OUTPATIENT
Start: 2021-08-30 | End: 2021-08-30

## 2021-08-30 RX ORDER — LIDOCAINE 40 MG/G
CREAM TOPICAL ONCE
Status: CANCELLED | OUTPATIENT
Start: 2021-08-30 | End: 2021-08-30

## 2021-08-30 RX ORDER — LIDOCAINE HYDROCHLORIDE 40 MG/ML
SOLUTION TOPICAL ONCE
Status: COMPLETED | OUTPATIENT
Start: 2021-08-30 | End: 2021-08-30

## 2021-08-30 RX ORDER — GENTAMICIN SULFATE 1 MG/G
OINTMENT TOPICAL ONCE
Status: CANCELLED | OUTPATIENT
Start: 2021-08-30 | End: 2021-08-30

## 2021-08-30 RX ADMIN — LIDOCAINE HYDROCHLORIDE: 40 SOLUTION TOPICAL at 09:53

## 2021-08-30 ASSESSMENT — PAIN DESCRIPTION - LOCATION: LOCATION: LEG

## 2021-08-30 ASSESSMENT — PAIN SCALES - GENERAL: PAINLEVEL_OUTOF10: 2

## 2021-08-30 ASSESSMENT — PAIN DESCRIPTION - PAIN TYPE: TYPE: CHRONIC PAIN

## 2021-08-30 ASSESSMENT — PAIN DESCRIPTION - ORIENTATION: ORIENTATION: RIGHT

## 2021-08-30 ASSESSMENT — PAIN DESCRIPTION - DESCRIPTORS: DESCRIPTORS: ACHING

## 2021-08-30 NOTE — PLAN OF CARE
[address] [facility]:     [address] f: [phone] [phone] p: [phone] [email or web address]      Ordering Center: The [address]. [facility]    Patient Information:      Rogerio Coto  [address] [facility]  [address]   : 1965  AGE: 64 y.o. GENDER: male   TODAYS DATE:  2021    Insurance:      PRIMARY INSURANCE:  Plan: [email or web address]  Coverage: A[place]ANDRE  Effective Date: 10/1/2019  [id number] - (Commercial)    SECONDARY INSURANCE:  Plan:   Coverage:   Effective Date:   [unfilled]    [unfilled]   [unfilled]     Patient Wound Information:      1. Noncompliance    2. Chronic acquired lymphedema    3.  Idiopathic chronic venous hypertension of right lower extremity with ulcer ([address]        WOUNDS REQUIRING DRESSING SUPPLIES:     Wound 21 Leg Right;Medial;Lower #1 (Active)   Wound Image   21 0954   Wound Etiology Venous 21 1120   Wound Cleansed Cleansed with saline 21 0958   Dressing/Treatment Collagen 21 1027   Wound Length (cm) 1.7 cm 21 0954   Wound Width (cm) 2.5 cm 21 0954   Wound Depth (cm) 0.1 cm 21 0954   Wound Surface Area (cm^2) 4.25 cm^2 21 0954   Change in Wound Size % (l*w) 88.47 21 0954   Wound Volume (cm^3) 0.425 cm^3 21 0954   Wound Healing % 88 21 0954   Post-Procedure Length (cm) 1.8 cm 21 1018   Post-Procedure Width (cm) 2.6 cm 21 1018   Post-Procedure Depth (cm) 0.3 cm 21 1018   Post-Procedure Surface Area (cm^2) 4.68 cm^2 21 1018   Post-Procedure Volume (cm^3) 1.404 cm^3 21 1018   Distance Tunneling (cm) 0 cm 21 0958   Undermining Maxium Distance (cm) 0 21 0958   Wound Assessment Pink/red;Fibrin 21 0954   Drainage Amount Moderate 08/30/21 0954   Drainage Description Brown 08/30/21 0954   Odor Mild 08/30/21 0954   Kiah-wound Assessment Intact 08/30/21 0954   Margins Defined edges 08/30/21 0954   Wound Thickness Description not for Pressure Injury Full thickness 08/30/21 0954   Number of days: 118       Wound 05/03/21 Leg Anterior; Lower;Right #2  (Active)   Wound Image   08/30/21 0954   Wound Etiology Venous 05/03/21 1120   Wound Cleansed Cleansed with saline 08/30/21 0954   Dressing/Treatment Collagen 08/23/21 1027   Wound Length (cm) 1 cm 08/30/21 0954   Wound Width (cm) 1 cm 08/30/21 0954   Wound Depth (cm) 0.1 cm 08/30/21 0954   Wound Surface Area (cm^2) 1 cm^2 08/30/21 0954   Change in Wound Size % (l*w) 99.42 08/30/21 0954   Wound Volume (cm^3) 0.1 cm^3 08/30/21 0954   Wound Healing % 99 08/30/21 0954   Post-Procedure Length (cm) 1.1 cm 08/30/21 1018   Post-Procedure Width (cm) 1.1 cm 08/30/21 1018   Post-Procedure Depth (cm) 0.3 cm 08/30/21 1018   Post-Procedure Surface Area (cm^2) 1.21 cm^2 08/30/21 1018   Post-Procedure Volume (cm^3) 0.363 cm^3 08/30/21 1018   Distance Tunneling (cm) 0 cm 08/16/21 0956   Undermining Ends___ O'Clock 0 07/12/21 0943   Undermining Maxium Distance (cm) 0 08/16/21 0956   Wound Assessment Pink/red;Fibrin 08/30/21 0954   Drainage Amount Moderate 08/30/21 0954   Drainage Description Brown 08/30/21 0954   Odor Mild 08/30/21 0954   Kiah-wound Assessment Intact 08/30/21 0954   Margins Defined edges 08/30/21 0954   Wound Thickness Description not for Pressure Injury Full thickness 08/30/21 0954   Number of days: 118       Wound 05/03/21 Leg Lower;Right;Posterior #3 (Active)   Wound Image   08/30/21 0954   Wound Etiology Venous 05/03/21 1120   Wound Cleansed Cleansed with saline 08/30/21 0954   Dressing/Treatment Collagen 08/23/21 1027   Wound Length (cm) 1.9 cm 08/30/21 0954   Wound Width (cm) 2 cm 08/30/21 0954   Wound Depth (cm) 0.1 cm 08/30/21 0954   Wound Surface Area (cm^2) 3.8 cm^2 08/30/21 0954   Change in Wound Size % (l*w) 88.31 08/30/21 0954   Wound Volume (cm^3) 0.38 cm^3 08/30/21 0954   Wound Healing % 88 08/30/21 0954   Post-Procedure Length (cm) 2 cm 08/30/21 1018   Post-Procedure Width (cm) 2.1 cm 08/30/21 1018   Post-Procedure Depth (cm) 0.3 cm 08/30/21 1018   Post-Procedure Surface Area (cm^2) 4.2 cm^2 08/30/21 1018   Post-Procedure Volume (cm^3) 1.26 cm^3 08/30/21 1018   Distance Tunneling (cm) 0 cm 08/23/21 0958   Undermining Maxium Distance (cm) 0 08/23/21 0958   Wound Assessment Fibrin;Pink/red 08/30/21 0954   Drainage Amount Moderate 08/30/21 0954   Drainage Description Brown 08/30/21 0954   Odor Mild 08/30/21 0954   Kiah-wound Assessment Intact 08/30/21 0954   Margins Defined edges 08/30/21 0954   Wound Thickness Description not for Pressure Injury Full thickness 08/30/21 0954   Number of days: 118       Wound 08/02/21 Leg Anterior; Lateral;Right #4 (Active)   Wound Image   08/30/21 0954   Wound Cleansed Cleansed with saline 08/30/21 0954   Dressing/Treatment Collagen 08/23/21 1027   Wound Length (cm) 1 cm 08/30/21 0954   Wound Width (cm) 2 cm 08/30/21 0954   Wound Depth (cm) 0.1 cm 08/30/21 0954   Wound Surface Area (cm^2) 2 cm^2 08/30/21 0954   Change in Wound Size % (l*w) 15.97 08/30/21 0954   Wound Volume (cm^3) 0.2 cm^3 08/30/21 0954   Wound Healing % 16 08/30/21 0954   Post-Procedure Length (cm) 1.1 cm 08/30/21 1018   Post-Procedure Width (cm) 2.1 cm 08/30/21 1018   Post-Procedure Depth (cm) 0.3 cm 08/30/21 1018   Post-Procedure Surface Area (cm^2) 2.31 cm^2 08/30/21 1018   Post-Procedure Volume (cm^3) 0.693 cm^3 08/30/21 1018   Distance Tunneling (cm) 0 cm 08/23/21 0958   Undermining Maxium Distance (cm) 0 08/23/21 0958   Wound Assessment Fibrin;Pink/red 08/30/21 0954   Drainage Amount Small 08/30/21 0954   Drainage Description Brown 08/30/21 0954   Odor Mild 08/30/21 0954   Kiah-wound Assessment Intact 08/30/21 0954   Margins Defined edges 08/30/21 0954   Wound Thickness Description not for Pressure Injury Full thickness 08/30/21 0954   Number of days: 28          Supplies Requested :      WOUND #: 1   PRIMARY DRESSING:  Collagen    Cover and Secure with: 4X4 gauze pad, bulky kerlix, 6 inch ace bandage     FREQUENCY OF DRESSING CHANGES:  Three times per week       WOUND #: 2   PRIMARY DRESSING:  Collagen    Cover and Secure with: 4X4 gauze pad, bulky kerlix, 6 inch ace bandage      FREQUENCY OF DRESSING CHANGES:  Three times per week       WOUND #: 3   PRIMARY DRESSING:  Collagen    Cover and Secure with: 4X4 gauze pad, bulky kerlix, 6 inch ace bandage     FREQUENCY OF DRESSING CHANGES:  Three times per week     WOUND #: 4   PRIMARY DRESSING:  Collagen    Cover and Secure with: 4X4 gauze pad, bulky kerlix, 6 inch ace bandage     FREQUENCY OF DRESSING CHANGES:  Three times per week       ADDITIONAL ITEMS:  [] Gloves Small  [] Gloves Medium [] Gloves Large [] Gloves XLarge  [] Tape 1\" [x] Tape 2\" [] Tape 3\"  [] Medipore Tape  [x] Saline  [] Skin Prep   [] Adhesive Remover   [] Cotton Tip Applicators   [] Other:    Patient Wound(s) Debrided: [x] Yes   [] No    Debridement Date: 8/30/2021    Debribement Type: Excisional/Sharp    Patient currently being seen by Home Health: [] Yes   [x] No    Duration for needed supplies:  []15  [x]30  []60  []90 Days    Provider Information:      PROVIDER'S NAME/NPI: Katia Allison MD,  NPI: [id number]    I give permission to coordinate the care for this patient   assisting with verbal orders: Oly Lewis RN 8/30/2021

## 2021-08-30 NOTE — PROGRESS NOTES
[address] [facility] [place] [facility]  Progress Note and Procedure Note      Leslie Khan  MEDICAL RECORD NUMBER:  [id number]  AGE: 64 y.o. GENDER: male  : 1965  EPISODE DATE:  2021    Subjective:     Chief Complaint   Patient presents with    Wound Check     right leg         HISTORY of PRESENT ILLNESS HPI     Leslie Khan is a 64 y.o. male who presents today for wound/ulcer evaluation. History of Wound Context: Continues follow-up for his chronic venous insufficiency with open wounds and lymphedema.  He has finished the oral antibiotics as prescribed by the infectious disease doctor. [facility] states there is less pain and drainage from the right lower extremity.  He claims to be using his lymphedema pumps  He has gone the entire week without having to have his compression wrap changed.       Pain Assessment:  Wound/Ulcer Pain Timing/Severity: none  Quality of pain: N/A  Severity:  0 / 10   Modifying Factors: None  Associated Signs/Symptoms: edema    Ulcer Identification:  Ulcer Type: venous  Contributing Factors: edema, venous stasis, lymphedema, obesity and anticoagulation therapy    Objective:      BP (!) 157/92   Pulse 72   Temp 96.9 °F (36.1 °C) (Temporal)   Resp 18     Wt Readings from Last 3 Encounters:   21 243 lb 2.7 oz (110.3 kg)   21 242 lb 3.2 oz (109.9 kg)   21 243 lb (110.2 kg)       PHYSICAL EXAM    Extremities: no cyanosis, no clubbing, 2 + edema-  right lower extremity and 4 small full-thickness wounds containing fibrin, slough, granulation, epithelialization at margins involving lateral, anterior, posterior and medial knee, dry scaly periwound skin dermal thickening and fibrosis                    Assessment:      1. Noncompliance    2. Chronic acquired lymphedema    3.  Idiopathic chronic venous hypertension of right lower extremity with ulcer ([facility] [address]         Procedure Note  Indications:  Based on my examination of this patient's wound(s) today, sharp excision is required to promote healing and evaluate the extent healing. Performed by: Juanis Flores MD    Consent obtained? Yes    Time out taken: Yes    Pain Control: Anesthetic: 4% Lidocaine Liquid Topical     Debridement:Excisional Debridement    Using curette the wound was sharply debrided    down through and including the removal of  epidermis, dermis and subcutaneous tissue. Devitalized Tissue Debrided:  fibrin and slough      Pre Debridement Measurements:  Are located in the Wound Documentation Flow Sheet   Wound #: 1, 2, 3 and 4     Post  Debridement Measurements:  Wound 05/03/21 Leg Right;Medial;Lower #1 (Active)   Wound Image   08/30/21 0954   Wound Etiology Venous 05/03/21 1120   Wound Cleansed Cleansed with saline 08/23/21 0958   Dressing/Treatment Collagen 08/23/21 1027   Wound Length (cm) 1.7 cm 08/30/21 0954   Wound Width (cm) 2.5 cm 08/30/21 0954   Wound Depth (cm) 0.1 cm 08/30/21 0954   Wound Surface Area (cm^2) 4.25 cm^2 08/30/21 0954   Change in Wound Size % (l*w) 88.47 08/30/21 0954   Wound Volume (cm^3) 0.425 cm^3 08/30/21 0954   Wound Healing % 88 08/30/21 0954   Post-Procedure Length (cm) 1.8 cm 08/30/21 1018   Post-Procedure Width (cm) 2.6 cm 08/30/21 1018   Post-Procedure Depth (cm) 0.3 cm 08/30/21 1018   Post-Procedure Surface Area (cm^2) 4.68 cm^2 08/30/21 1018   Post-Procedure Volume (cm^3) 1.404 cm^3 08/30/21 1018   Distance Tunneling (cm) 0 cm 08/23/21 0958   Undermining Maxium Distance (cm) 0 08/23/21 0958   Wound Assessment Pink/red;Fibrin 08/30/21 0954   Drainage Amount Moderate 08/30/21 0954   Drainage Description Brown 08/30/21 0954   Odor Mild 08/30/21 0954   Kiah-wound Assessment Intact 08/30/21 0954   Margins Defined edges 08/30/21 0954   Wound Thickness Description not for Pressure Injury Full thickness 08/30/21 0954   Number of days: 118       Wound 05/03/21 Leg Anterior; Lower;Right #2  (Active)   Wound Image   08/30/21 0954   Wound Etiology Venous 05/03/21 1120   Wound Cleansed Cleansed with saline 08/30/21 0954   Dressing/Treatment Collagen 08/23/21 1027   Wound Length (cm) 1 cm 08/30/21 0954   Wound Width (cm) 1 cm 08/30/21 0954   Wound Depth (cm) 0.1 cm 08/30/21 0954   Wound Surface Area (cm^2) 1 cm^2 08/30/21 0954   Change in Wound Size % (l*w) 99.42 08/30/21 0954   Wound Volume (cm^3) 0.1 cm^3 08/30/21 0954   Wound Healing % 99 08/30/21 0954   Post-Procedure Length (cm) 1.1 cm 08/30/21 1018   Post-Procedure Width (cm) 1.1 cm 08/30/21 1018   Post-Procedure Depth (cm) 0.3 cm 08/30/21 1018   Post-Procedure Surface Area (cm^2) 1.21 cm^2 08/30/21 1018   Post-Procedure Volume (cm^3) 0.363 cm^3 08/30/21 1018   Distance Tunneling (cm) 0 cm 08/16/21 0956   Undermining Ends___ O'Clock 0 07/12/21 0943   Undermining Maxium Distance (cm) 0 08/16/21 0956   Wound Assessment Pink/red;Fibrin 08/30/21 0954   Drainage Amount Moderate 08/30/21 0954   Drainage Description Brown 08/30/21 0954   Odor Mild 08/30/21 0954   Kiah-wound Assessment Intact 08/30/21 0954   Margins Defined edges 08/30/21 0954   Wound Thickness Description not for Pressure Injury Full thickness 08/30/21 0954   Number of days: 118       Wound 05/03/21 Leg Lower;Right;Posterior #3 (Active)   Wound Image   08/30/21 0954   Wound Etiology Venous 05/03/21 1120   Wound Cleansed Cleansed with saline 08/30/21 0954   Dressing/Treatment Collagen 08/23/21 1027   Wound Length (cm) 1.9 cm 08/30/21 0954   Wound Width (cm) 2 cm 08/30/21 0954   Wound Depth (cm) 0.1 cm 08/30/21 0954   Wound Surface Area (cm^2) 3.8 cm^2 08/30/21 0954   Change in Wound Size % (l*w) 88.31 08/30/21 0954   Wound Volume (cm^3) 0.38 cm^3 08/30/21 0954   Wound Healing % 88 08/30/21 0954   Post-Procedure Length (cm) 2 cm 08/30/21 1018   Post-Procedure Width (cm) 2.1 cm 08/30/21 1018   Post-Procedure Depth (cm) 0.3 cm 08/30/21 1018   Post-Procedure Surface Area (cm^2) 4.2 cm^2 08/30/21 1018   Post-Procedure Volume (cm^3) 1.26 cm^3 08/30/21 1018   Distance Tunneling (cm) 0 cm Plan:     Treatment Note: Please see attached Discharge Instructions. These instructions were given and signed by the patient or POA    New Medication(s) at this visit:   New Prescriptions    No medications on file       Other orders at this visit:   Orders Placed This Encounter   Procedures   [address] [address] [facility] Protocol       Discharge Instructions          [address] [facility] Physician Orders and Discharge Instructions  [address] [address] [address] [address] [facility]. [facility]. [facility]  Telephone: [phone] hands with soap and water prior to and after every dressing change. Wound Cleansing:   · Do not scrub or use excessive force. · With each dressing change, rinse wounds with 0.9% Saline. (May use wound wash or soft contact solution. Both can be purchased at a local drug store). · If unable to obtain saline, may use a gentle soap and water. · Keep wounds dry in the shower unless otherwise instructed by the physician. · For wounds on lower legs, cast covers can be purchased at local drug stores, so that you may shower and keep the wound(s) dry. [] Instructions for Vashe Wash solution ONLY: Apply enough Vashe to soak a piece of gauze and place on wound bed for 5-10 minutes. DO NOT rinse after Vashe has been applied. Follow dressing application as instructed below. Kiah wound Topical Treatments:  Do not apply lotions, creams, or ointments to the skin around the wound bed unless directed as followed:     [] Apply around the wound: [] moisturizing lotion [] Antifungal ointment [] No-Sting barrier film [] Zinc paste [] Other:       Dressings:           Wound Location: right lower leg      Apply Primary Dressing to wound:       Collagen      Cover and Secure with:     Cover and Secure with: 4X4 gauze pad   Avoid contact of tape with skin if possible.      When to change Dressing: [] Daily [] Every Other Day [] Once a week  [] Three times per week: [] Monday, Wednesday, Friday [] Tuesday, Thursday, Saturday  [x] Do Not Change Dressing [] Other:         Edema Control:  Apply: [x] Compression Stocking  [x] Left Lower Leg [] Right Lower Leg        Apply every morning immediately when getting up. They should be applied to affected leg(s) from mid foot to knee making sure to cover the heel. Remove every night before going to bed. [x] Elevate leg(s) above the level of the heart for 30 minutes 4-5 times a day and/or when sitting. [x] Avoid prolonged standing in one place. Multilayer Compression Wrap:    Type: 4 Layer Compression Wrap   · Applied in Clinic to the :  Right lower leg(s) on 8/30/2021  · Do not get leg(s) with wrap wet. · If wraps become too tight call the center or completely remove the wrap. · Elevate leg(s) above the level of the heart when sitting. · Avoid prolonged standing in one place. Lymphedema Therapy:  [x] Wear Lymphedema pumps twice a day at settings prescribed by your physician. 1 hour twice daily  [x]Elevate leg(s) above the level of the heart for 30 minutes 4-5 times a day and/or when sitting. [x] Avoid prolonged standing in one place. Dietary:  Important dietary reminders:  1. Increase Protein intake (i.e. Lean meats, fish, eggs, legumes, and yogurt)  2. No added salt  3. If diabetic, follow a diabetic diet and check glucose prior to meals or as instructed by your physician. Dietary Supplements:  [] Papito  [] 30ml ProStat  [] EnsureEnlive [] Ensure Max/Premier  [] Other:    If you are still having pain after you go home:   For wounds on lower legs or arms, elevate the affected limb.  Use over-the-counter medications you would normally use for pain as permitted by your primary care doctor.  For persistent pain not relieved by the above interventions, please call your primary care doctor.      Return Appointment:   Return Appointment: With Dr. Nazario Cadena  in  1 Week(s)  o Scheduled weekly until (Date):      [] Return Appointment for a Wound Assessment with a nurse on:     o All other future appointments:  Future Appointments   Date Time Provider Eloina Woodi   10/27/2021  9:20 AM Enoch Rutledge MD [address] [place] [address]   10/27/2021 10:15 AM Mike Dominguez MM [facility]   -     [x]DME/Wound Dressing Supplies ordered at this visit: []Yes []No  o Supplies Provided by:   o Please call them directly to reorder supplies when you run out.  o CONTINUE TO USE THE SUPPLIES YOU HAVE AVAILABLE. IT IS MOST IMPORTANT TO KEEP THE WOUND COVERED AT ALL TIMES. [x] Orders placed during your visit:   Orders Placed This Encounter   Procedures    Initiate Outpatient Wound Care Protocol       Your nurse  is:  Alfred Perkins     Electronically signed by Elizabeth Reyna RN on 8/30/2021 at 10:27 AM     [address] [place] [address] Information: Should you experience any significant changes in your wound(s) or have questions about your wound care, please contact the [address] [place] [address] at [phone]. We are open from 8:00am - 4:30p Monday thru Friday except for Wednesdays which we are closed. Please give us 24-48 hours to return your call. Call your doctor now or seek immediate medical care if:    · You have symptoms of infection, such as:  ? Increased pain, swelling, warmth, or redness. ? Red streaks leading from the area. ? Pus draining from the area. ? A fever.         Physician for this visit and orders: Shukri Suazo MD    [] Patient unable to sign Discharge Instructions given to ECF/Transportation/POA        Electronically signed by Shukri Suazo MD on 8/30/2021 at 10:39 AM

## 2021-09-07 ENCOUNTER — HOSPITAL ENCOUNTER (OUTPATIENT)
Dept: WOUND CARE | Age: 56
Discharge: HOME | End: 2021-09-07
Payer: COMMERCIAL

## 2021-09-07 VITALS
RESPIRATION RATE: 16 BRPM | DIASTOLIC BLOOD PRESSURE: 81 MMHG | SYSTOLIC BLOOD PRESSURE: 138 MMHG | TEMPERATURE: 96.9 F | HEART RATE: 79 BPM

## 2021-09-07 DIAGNOSIS — Z91.199 NONCOMPLIANCE: Primary | ICD-10-CM

## 2021-09-07 DIAGNOSIS — I89.0 CHRONIC ACQUIRED LYMPHEDEMA: ICD-10-CM

## 2021-09-07 DIAGNOSIS — L97.919 IDIOPATHIC CHRONIC VENOUS HYPERTENSION OF RIGHT LOWER EXTREMITY WITH ULCER (HCC): ICD-10-CM

## 2021-09-07 DIAGNOSIS — I87.311 IDIOPATHIC CHRONIC VENOUS HYPERTENSION OF RIGHT LOWER EXTREMITY WITH ULCER (HCC): ICD-10-CM

## 2021-09-07 PROCEDURE — 29581 APPL MULTLAYER CMPRN SYS LEG: CPT

## 2021-09-07 PROCEDURE — 11042 DBRDMT SUBQ TIS 1ST 20SQCM/<: CPT

## 2021-09-07 PROCEDURE — 6370000000 HC RX 637 (ALT 250 FOR IP): Performed by: SPECIALIST

## 2021-09-07 PROCEDURE — 11042 DBRDMT SUBQ TIS 1ST 20SQCM/<: CPT | Performed by: SPECIALIST

## 2021-09-07 RX ORDER — BACITRACIN, NEOMYCIN, POLYMYXIN B 400; 3.5; 5 [USP'U]/G; MG/G; [USP'U]/G
OINTMENT TOPICAL ONCE
Status: CANCELLED | OUTPATIENT
Start: 2021-09-07 | End: 2021-09-07

## 2021-09-07 RX ORDER — LIDOCAINE HYDROCHLORIDE 20 MG/ML
JELLY TOPICAL ONCE
Status: CANCELLED | OUTPATIENT
Start: 2021-09-07 | End: 2021-09-07

## 2021-09-07 RX ORDER — CLOBETASOL PROPIONATE 0.5 MG/G
OINTMENT TOPICAL ONCE
Status: CANCELLED | OUTPATIENT
Start: 2021-09-07 | End: 2021-09-07

## 2021-09-07 RX ORDER — LIDOCAINE HYDROCHLORIDE 40 MG/ML
SOLUTION TOPICAL ONCE
Status: CANCELLED | OUTPATIENT
Start: 2021-09-07 | End: 2021-09-07

## 2021-09-07 RX ORDER — LIDOCAINE 50 MG/G
OINTMENT TOPICAL ONCE
Status: CANCELLED | OUTPATIENT
Start: 2021-09-07 | End: 2021-09-07

## 2021-09-07 RX ORDER — GINSENG 100 MG
CAPSULE ORAL ONCE
Status: CANCELLED | OUTPATIENT
Start: 2021-09-07 | End: 2021-09-07

## 2021-09-07 RX ORDER — LIDOCAINE HYDROCHLORIDE 40 MG/ML
SOLUTION TOPICAL ONCE
Status: COMPLETED | OUTPATIENT
Start: 2021-09-07 | End: 2021-09-07

## 2021-09-07 RX ORDER — GENTAMICIN SULFATE 1 MG/G
OINTMENT TOPICAL ONCE
Status: CANCELLED | OUTPATIENT
Start: 2021-09-07 | End: 2021-09-07

## 2021-09-07 RX ORDER — LIDOCAINE 40 MG/G
CREAM TOPICAL ONCE
Status: CANCELLED | OUTPATIENT
Start: 2021-09-07 | End: 2021-09-07

## 2021-09-07 RX ORDER — BETAMETHASONE DIPROPIONATE 0.05 %
OINTMENT (GRAM) TOPICAL ONCE
Status: CANCELLED | OUTPATIENT
Start: 2021-09-07 | End: 2021-09-07

## 2021-09-07 RX ORDER — BACITRACIN ZINC AND POLYMYXIN B SULFATE 500; 1000 [USP'U]/G; [USP'U]/G
OINTMENT TOPICAL ONCE
Status: CANCELLED | OUTPATIENT
Start: 2021-09-07 | End: 2021-09-07

## 2021-09-07 RX ADMIN — LIDOCAINE HYDROCHLORIDE: 40 SOLUTION TOPICAL at 10:26

## 2021-09-07 ASSESSMENT — PAIN SCALES - GENERAL: PAINLEVEL_OUTOF10: 3

## 2021-09-07 ASSESSMENT — PAIN DESCRIPTION - LOCATION: LOCATION: LEG

## 2021-09-07 ASSESSMENT — PAIN DESCRIPTION - ORIENTATION: ORIENTATION: RIGHT

## 2021-09-07 ASSESSMENT — PAIN DESCRIPTION - ONSET: ONSET: ON-GOING

## 2021-09-07 ASSESSMENT — PAIN DESCRIPTION - PROGRESSION: CLINICAL_PROGRESSION: NOT CHANGED

## 2021-09-07 ASSESSMENT — PAIN DESCRIPTION - DESCRIPTORS: DESCRIPTORS: ACHING

## 2021-09-07 ASSESSMENT — PAIN DESCRIPTION - PAIN TYPE: TYPE: CHRONIC PAIN

## 2021-09-07 ASSESSMENT — PAIN DESCRIPTION - FREQUENCY: FREQUENCY: INTERMITTENT

## 2021-09-07 NOTE — PROGRESS NOTES
Multilayer Compression Wrap   (Not Unna) Below the Knee    NAME:  Emile Overton  YOB: 1965  MEDICAL RECORD NUMBER:  [id number]  DATE:  9/7/2021        Removed old Multilayer wrap if present and washed leg with mild soap/water.   Applied moisturizing agent to dry skin as needed.   Applied primary and secondary dressing as ordered     Applied multilayered dressing below the knee to Right lower leg(s)  (4 Layer Compression Wrap ) .   Instructed patient/caregiver not to remove dressing and to keep it clean and dry.   Instructed patient/caregiver on complications to report to provider, such as pain, numbness in toes, heavy drainage, and slippage of dressing.   Instructed patient on purpose of compression dressing and on activity and exercise recommendations.     Applied per   Guidelines    Electronically signed by Wilfredo Carlin RN on 9/7/2021 at 10:46 AM

## 2021-09-07 NOTE — PROGRESS NOTES
1227 [facility]  Progress Note and Procedure Note      Ruben Bustamante  MEDICAL RECORD NUMBER:  [id number]  AGE: 64 y.o. GENDER: male  : 1965  EPISODE DATE:  2021    Subjective:     Chief Complaint   Patient presents with    Wound Check     right lower extremity         HISTORY of PRESENT ILLNESS HPI     Ruben Bustamante is a 64 y.o. male who presents today for wound/ulcer evaluation. History of Wound Context: Continues follow-up for his chronic venous insufficiency with open wounds and lymphedema.  He has finished the oral antibiotics as prescribed by the infectious disease doctor. Kenan Tripp states there is less pain and drainage from the right lower extremity.  He claims to be using his lymphedema pumps  He has gone the entire week without having to have his compression wrap changed.     Pain Assessment:  Wound/Ulcer Pain Timing/Severity: none  Quality of pain: N/A  Severity:  0 / 10   Modifying Factors: None  Associated Signs/Symptoms: edema    Ulcer Identification:  Ulcer Type: venous  Contributing Factors: edema, venous stasis, lymphedema, obesity and anticoagulation therapy    Objective:      /81   Pulse 79   Temp 96.9 °F (36.1 °C) (Tympanic)   Resp 16     Wt Readings from Last 3 Encounters:   21 243 lb 2.7 oz (110.3 kg)   21 242 lb 3.2 oz (109.9 kg)   21 243 lb (110.2 kg)       PHYSICAL EXAM    Extremities: no cyanosis, no clubbing, 2 + edema-  right lower extremity with dermal fibrosis and thickening, 4 separate small full-thickness wounds containing fibrin, slough, granulation tissue and minimal epithelialization at margins evolving lateral, anterior, posterior and medial knee, periwound remains dry flaky    Assessment:      1. Noncompliance    2. Chronic acquired lymphedema    3.  Idiopathic chronic venous hypertension of right lower extremity with ulcer ([facility] [address]         Procedure Note  Indications:  Based on my examination of this patient's wound(s) today, sharp excision is required to promote healing and evaluate the extent healing. Performed by: Howard Pastor MD    Consent obtained? Yes    Time out taken: Yes    Pain Control: Anesthetic: 4% Lidocaine Liquid Topical     Debridement:Excisional Debridement    Using curette the wound was sharply debrided    down through and including the removal of  epidermis, dermis and subcutaneous tissue. Devitalized Tissue Debrided:  fibrin and slough      Pre Debridement Measurements:  Are located in the Wound Documentation Flow Sheet   Wound #: 1, 2, 3 and 4     Post  Debridement Measurements:  Wound 05/03/21 Leg Right;Medial;Lower #1 (Active)   Wound Image   08/30/21 0954   Wound Etiology Venous 05/03/21 1120   Wound Cleansed Cleansed with saline 09/07/21 1007   Dressing/Treatment Hydrofera blue 09/07/21 1036   Wound Length (cm) 2 cm 09/07/21 1007   Wound Width (cm) 2.1 cm 09/07/21 1007   Wound Depth (cm) 0.1 cm 09/07/21 1007   Wound Surface Area (cm^2) 4.2 cm^2 09/07/21 1007   Change in Wound Size % (l*w) 88.6 09/07/21 1007   Wound Volume (cm^3) 0.42 cm^3 09/07/21 1007   Wound Healing % 89 09/07/21 1007   Post-Procedure Length (cm) 2.1 cm 09/07/21 1036   Post-Procedure Width (cm) 2.2 cm 09/07/21 1036   Post-Procedure Depth (cm) 0.3 cm 09/07/21 1036   Post-Procedure Surface Area (cm^2) 4.62 cm^2 09/07/21 1036   Post-Procedure Volume (cm^3) 1.386 cm^3 09/07/21 1036   Distance Tunneling (cm) 0 cm 09/07/21 1007   Undermining Maxium Distance (cm) 0 09/07/21 1007   Wound Assessment Fibrin;Pink/red 09/07/21 1007   Drainage Amount Moderate 09/07/21 1007   Drainage Description Sanguinous 09/07/21 1007   Odor Mild 09/07/21 1007   Kiah-wound Assessment Intact 09/07/21 1007   Margins Defined edges 09/07/21 1007   Wound Thickness Description not for Pressure Injury Full thickness 09/07/21 1007   Number of days: 126       Wound 05/03/21 Leg Anterior; Lower;Right #2  (Active)   Wound Image   08/30/21 0979   Wound Etiology Venous 05/03/21 1120   Wound Cleansed Cleansed with saline 09/07/21 1007   Dressing/Treatment Hydrofera blue 09/07/21 1036   Wound Length (cm) 1 cm 09/07/21 1007   Wound Width (cm) 1 cm 09/07/21 1007   Wound Depth (cm) 0.1 cm 09/07/21 1007   Wound Surface Area (cm^2) 1 cm^2 09/07/21 1007   Change in Wound Size % (l*w) 99.42 09/07/21 1007   Wound Volume (cm^3) 0.1 cm^3 09/07/21 1007   Wound Healing % 99 09/07/21 1007   Post-Procedure Length (cm) 1.1 cm 09/07/21 1036   Post-Procedure Width (cm) 1.1 cm 09/07/21 1036   Post-Procedure Depth (cm) 0.3 cm 09/07/21 1036   Post-Procedure Surface Area (cm^2) 1.21 cm^2 09/07/21 1036   Post-Procedure Volume (cm^3) 0.363 cm^3 09/07/21 1036   Distance Tunneling (cm) 0 cm 08/16/21 0956   Tunneling Position ___ O'Clock 0 09/07/21 1007   Undermining Starts ___ O'Clock 0 09/07/21 1007   Undermining Ends___ O'Clock 0 09/07/21 1007   Undermining Maxium Distance (cm) 0 09/07/21 1007   Wound Assessment Fibrin;Pink/red 09/07/21 1007   Drainage Amount Small 09/07/21 1007   Drainage Description Brown 09/07/21 1007   Odor None 09/07/21 1007   Kiah-wound Assessment Intact 09/07/21 1007   Margins Defined edges 09/07/21 1007   Wound Thickness Description not for Pressure Injury Full thickness 09/07/21 1007   Number of days: 126       Wound 05/03/21 Leg Lower;Right;Posterior #3 (Active)   Wound Image   08/30/21 0954   Wound Etiology Venous 05/03/21 1120   Wound Cleansed Cleansed with saline 09/07/21 1007   Dressing/Treatment Hydrofera blue 09/07/21 1036   Wound Length (cm) 2 cm 09/07/21 1007   Wound Width (cm) 1.9 cm 09/07/21 1007   Wound Depth (cm) 0.1 cm 09/07/21 1007   Wound Surface Area (cm^2) 3.8 cm^2 09/07/21 1007   Change in Wound Size % (l*w) 88.31 09/07/21 1007   Wound Volume (cm^3) 0.38 cm^3 09/07/21 1007   Wound Healing % 88 09/07/21 1007   Post-Procedure Length (cm) 2.1 cm 09/07/21 1036   Post-Procedure Width (cm) 2 cm 09/07/21 1036   Post-Procedure Depth (cm) 0.3 cm 09/07/21 1036 Post-Procedure Surface Area (cm^2) 4.2 cm^2 09/07/21 1036   Post-Procedure Volume (cm^3) 1.26 cm^3 09/07/21 1036   Distance Tunneling (cm) 0 cm 09/07/21 1007   Undermining Maxium Distance (cm) 0 09/07/21 1007   Wound Assessment Fibrin;Pink/red 09/07/21 1007   Drainage Amount Moderate 09/07/21 1007   Drainage Description Sanguinous;Brown 09/07/21 1007   Odor Mild 09/07/21 1007   Kiah-wound Assessment Intact 09/07/21 1007   Margins Defined edges 09/07/21 1007   Wound Thickness Description not for Pressure Injury Full thickness 09/07/21 1007   Number of days: 126       Wound 08/02/21 Leg Anterior; Lateral;Right #4 (Active)   Wound Image   08/30/21 0954   Wound Cleansed Cleansed with saline 09/07/21 1007   Dressing/Treatment Hydrofera blue 09/07/21 1036   Wound Length (cm) 1.3 cm 09/07/21 1007   Wound Width (cm) 2 cm 09/07/21 1007   Wound Depth (cm) 0.1 cm 09/07/21 1007   Wound Surface Area (cm^2) 2.6 cm^2 09/07/21 1007   Change in Wound Size % (l*w) -9.24 09/07/21 1007   Wound Volume (cm^3) 0.26 cm^3 09/07/21 [facility]   Wound Healing % -9 09/07/21 1007   Post-Procedure Length (cm) 1.4 cm 09/07/21 1036   Post-Procedure Width (cm) 2.1 cm 09/07/21 1036   Post-Procedure Depth (cm) 0.3 cm 09/07/21 1036   Post-Procedure Surface Area (cm^2) 2.94 cm^2 09/07/21 1036   Post-Procedure Volume (cm^3) 0.882 cm^3 09/07/21 1036   Distance Tunneling (cm) 0 cm 09/07/21 1007   Undermining Maxium Distance (cm) 0 09/07/21 1007   Wound Assessment Fibrin;Pink/red 09/07/21 1007   Drainage Amount Small 09/07/21 1007   Drainage Description Serosanguinous;Brown 09/07/21 1007   Odor Mild 09/07/21 1007   Kiah-wound Assessment Intact 09/07/21 1007   Margins Defined edges 09/07/21 1007   Wound Thickness Description not for Pressure Injury Full thickness 09/07/21 1007   Number of days: 36          Percent of Wound Debrided: 100%    Total Surface Area Debrided:  12 sq cm    Diabetic/Pressure/Non Pressure Ulcers only:  Ulcer: Non-Pressure ulcer, fat layer Wound Location: right lower leg wound    Apply Primary Dressing to wound:       Hydrofera Blue pad- ready     Cover and Secure with:     Cover and Secure with: 4X4 gauze pad   Avoid contact of tape with skin if possible.  When to change Dressing: [] Daily [] Every Other Day [] Once a week  [] Three times per week: [] Monday, Wednesday, Friday [] Tuesday, Thursday, Saturday  [x] Do Not Change Dressing [] Other     Edema Control:  Apply: [x] Compression Stocking  [x] Left Lower Leg [] Right Lower Leg        Apply every morning immediately when getting up. They should be applied to affected leg(s) from mid foot to knee making sure to cover the heel. Remove every night before going to bed. [x] Elevate leg(s) above the level of the heart for 30 minutes 4-5 times a day and/or when sitting. [x] Avoid prolonged standing in one place. Multilayer Compression Wrap:    Type: 4 Layer Compression Wrap   · Applied in Clinic to the :  Right lower leg(s) on 9/7/2021  · Do not get leg(s) with wrap wet. · If wraps become too tight call the center or completely remove the wrap. · Elevate leg(s) above the level of the heart when sitting. · Avoid prolonged standing in one place. Lymphedema Therapy:  [x] Wear Lymphedema pumps twice a day at settings prescribed by your physician. [x]Elevate leg(s) above the level of the heart for 30 minutes 4-5 times a day and/or when sitting. [x] Avoid prolonged standing in one place. Dietary:  Important dietary reminders:  1. Increase Protein intake (i.e. Lean meats, fish, eggs, legumes, and yogurt)  2. No added salt  3. If diabetic, follow a diabetic diet and check glucose prior to meals or as instructed by your physician. Dietary Supplements:  [] Papito  [] 30ml ProStat  [] EnsureEnlive [] Ensure Max/Premier  [] Other:    If you are still having pain after you go home:   For wounds on lower legs or arms, elevate the affected limb.    Use over-the-counter medications you would normally use for pain as permitted by your primary care doctor.  For persistent pain not relieved by the above interventions, please call your primary care doctor. Return Appointment:   Return Appointment: With Dr. Alysha Alicia  in  [address] [place] [address])  o Scheduled weekly until (Date):      [] Return Appointment for a Wound Assessment with a nurse on:     o All other future appointments:  Future Appointments   Date Time Provider Eloina Zamarripa   10/27/2021  9:20 AM Maricarmen Velásquez MD [address] [place] [address]   10/27/2021 10:15 AM Mike Dominguez [facility]   -     [x]DME/Wound Dressing Supplies ordered at this visit: []Yes []No  o Supplies Provided by:   o Please call them directly to reorder supplies when you run out.  o CONTINUE TO USE THE SUPPLIES YOU HAVE AVAILABLE. IT IS MOST IMPORTANT TO KEEP THE WOUND COVERED AT ALL TIMES. [x] Orders placed during your visit:   Orders Placed This Encounter   Procedures    Initiate Outpatient Wound Care Protocol       Your nurse  is:  Jayden Ramos     Electronically signed by Baylee Alexander RN on 9/7/2021 at 10:42 AM     [address] [place] [address] Information: Should you experience any significant changes in your wound(s) or have questions about your wound care, please contact the [address] [place] [address] at [phone]. We are open from 8:00am - 4:30p Monday thru Friday except for Wednesdays which we are closed. Please give us 24-48 hours to return your call. Call your doctor now or seek immediate medical care if:    · You have symptoms of infection, such as:  ? Increased pain, swelling, warmth, or redness. ? Red streaks leading from the area. ? Pus draining from the area. ? A fever.         Physician for this visit and orders: Ana Turner MD    [] Patient unable to sign Discharge Instructions given to ECF/Transportation/POA        Electronically signed by Ana Turner MD on 9/7/2021 at 10:49 AM

## 2021-09-08 NOTE — TELEPHONE ENCOUNTER
Call placed to this patient to discuss a good time to go to [facility] for his PICC line placement. He stated \"tomorrow if possible but I have to work\"     [facility] notified, spoke with Dhruv Maria who said they will call the ID team back with a good day and time once the picc team is notified and respond.  Awaiting for call back .

## 2021-09-13 ENCOUNTER — HOSPITAL ENCOUNTER (OUTPATIENT)
Dept: WOUND CARE | Age: 56
Discharge: HOME | End: 2021-09-13
Payer: COMMERCIAL

## 2021-09-13 VITALS
DIASTOLIC BLOOD PRESSURE: 83 MMHG | RESPIRATION RATE: 16 BRPM | TEMPERATURE: 97.3 F | SYSTOLIC BLOOD PRESSURE: 149 MMHG | HEART RATE: 71 BPM

## 2021-09-13 DIAGNOSIS — I82.423 THROMBOSIS OF BOTH ILIAC VEINS (HCC): ICD-10-CM

## 2021-09-13 DIAGNOSIS — I82.220 THROMBOSIS OF INFERIOR VENA CAVA (HCC): ICD-10-CM

## 2021-09-13 DIAGNOSIS — I89.0 CHRONIC ACQUIRED LYMPHEDEMA: ICD-10-CM

## 2021-09-13 DIAGNOSIS — Z79.01 CHRONIC ANTICOAGULATION: ICD-10-CM

## 2021-09-13 DIAGNOSIS — L97.919 IDIOPATHIC CHRONIC VENOUS HYPERTENSION OF RIGHT LOWER EXTREMITY WITH ULCER (HCC): ICD-10-CM

## 2021-09-13 DIAGNOSIS — I87.311 IDIOPATHIC CHRONIC VENOUS HYPERTENSION OF RIGHT LOWER EXTREMITY WITH ULCER (HCC): ICD-10-CM

## 2021-09-13 DIAGNOSIS — I82.5Z1 CHRONIC VENOUS EMBOLISM AND THROMBOSIS OF DEEP VESSELS OF DISTAL END OF RIGHT LOWER EXTREMITY (HCC): ICD-10-CM

## 2021-09-13 DIAGNOSIS — Z91.199 NONCOMPLIANCE: Primary | ICD-10-CM

## 2021-09-13 PROCEDURE — 11042 DBRDMT SUBQ TIS 1ST 20SQCM/<: CPT | Performed by: SPECIALIST

## 2021-09-13 PROCEDURE — 6370000000 HC RX 637 (ALT 250 FOR IP): Performed by: SPECIALIST

## 2021-09-13 PROCEDURE — 11042 DBRDMT SUBQ TIS 1ST 20SQCM/<: CPT

## 2021-09-13 PROCEDURE — 29581 APPL MULTLAYER CMPRN SYS LEG: CPT

## 2021-09-13 RX ORDER — LIDOCAINE HYDROCHLORIDE 40 MG/ML
SOLUTION TOPICAL ONCE
Status: CANCELLED | OUTPATIENT
Start: 2021-09-13 | End: 2021-09-13

## 2021-09-13 RX ORDER — BETAMETHASONE DIPROPIONATE 0.05 %
OINTMENT (GRAM) TOPICAL ONCE
Status: CANCELLED | OUTPATIENT
Start: 2021-09-13 | End: 2021-09-13

## 2021-09-13 RX ORDER — WARFARIN SODIUM 5 MG/1
TABLET ORAL
Qty: 48 TABLET | Refills: 0 | Status: SHIPPED | OUTPATIENT
Start: 2021-09-13 | End: 2021-10-13

## 2021-09-13 RX ORDER — GINSENG 100 MG
CAPSULE ORAL ONCE
Status: CANCELLED | OUTPATIENT
Start: 2021-09-13 | End: 2021-09-13

## 2021-09-13 RX ORDER — BACITRACIN ZINC AND POLYMYXIN B SULFATE 500; 1000 [USP'U]/G; [USP'U]/G
OINTMENT TOPICAL ONCE
Status: CANCELLED | OUTPATIENT
Start: 2021-09-13 | End: 2021-09-13

## 2021-09-13 RX ORDER — CLINDAMYCIN HYDROCHLORIDE 300 MG/1
300 CAPSULE ORAL 3 TIMES DAILY
COMMUNITY
End: 2021-10-27 | Stop reason: SDUPTHER

## 2021-09-13 RX ORDER — LIDOCAINE HYDROCHLORIDE 20 MG/ML
JELLY TOPICAL ONCE
Status: CANCELLED | OUTPATIENT
Start: 2021-09-13 | End: 2021-09-13

## 2021-09-13 RX ORDER — LIDOCAINE 50 MG/G
OINTMENT TOPICAL ONCE
Status: CANCELLED | OUTPATIENT
Start: 2021-09-13 | End: 2021-09-13

## 2021-09-13 RX ORDER — LIDOCAINE HYDROCHLORIDE 40 MG/ML
SOLUTION TOPICAL ONCE
Status: COMPLETED | OUTPATIENT
Start: 2021-09-13 | End: 2021-09-13

## 2021-09-13 RX ORDER — BACITRACIN, NEOMYCIN, POLYMYXIN B 400; 3.5; 5 [USP'U]/G; MG/G; [USP'U]/G
OINTMENT TOPICAL ONCE
Status: CANCELLED | OUTPATIENT
Start: 2021-09-13 | End: 2021-09-13

## 2021-09-13 RX ORDER — GENTAMICIN SULFATE 1 MG/G
OINTMENT TOPICAL ONCE
Status: CANCELLED | OUTPATIENT
Start: 2021-09-13 | End: 2021-09-13

## 2021-09-13 RX ORDER — LIDOCAINE 40 MG/G
CREAM TOPICAL ONCE
Status: CANCELLED | OUTPATIENT
Start: 2021-09-13 | End: 2021-09-13

## 2021-09-13 RX ORDER — CLINDAMYCIN HYDROCHLORIDE 300 MG/1
300 CAPSULE ORAL 3 TIMES DAILY
Qty: 30 CAPSULE | Refills: 0 | Status: SHIPPED | OUTPATIENT
Start: 2021-09-13 | End: 2021-09-23

## 2021-09-13 RX ORDER — CLOBETASOL PROPIONATE 0.5 MG/G
OINTMENT TOPICAL ONCE
Status: CANCELLED | OUTPATIENT
Start: 2021-09-13 | End: 2021-09-13

## 2021-09-13 RX ADMIN — LIDOCAINE HYDROCHLORIDE: 40 SOLUTION TOPICAL at 09:48

## 2021-09-13 ASSESSMENT — PAIN DESCRIPTION - ORIENTATION: ORIENTATION: RIGHT

## 2021-09-13 ASSESSMENT — PAIN SCALES - GENERAL: PAINLEVEL_OUTOF10: 3

## 2021-09-13 ASSESSMENT — PAIN DESCRIPTION - DESCRIPTORS: DESCRIPTORS: ACHING

## 2021-09-13 ASSESSMENT — PAIN DESCRIPTION - FREQUENCY: FREQUENCY: CONTINUOUS

## 2021-09-13 ASSESSMENT — PAIN DESCRIPTION - PAIN TYPE: TYPE: CHRONIC PAIN

## 2021-09-13 ASSESSMENT — PAIN DESCRIPTION - LOCATION: LOCATION: LEG

## 2021-09-13 NOTE — PROGRESS NOTES
Multilayer Compression Wrap   (Not Unna) Below the Knee    NAME:  Malinda Leslie  YOB: 1965  MEDICAL RECORD NUMBER:  [id number]  DATE:  9/13/2021        Removed old Multilayer wrap if present and washed leg with mild soap/water.   Applied moisturizing agent to dry skin as needed.   Applied primary and secondary dressing as ordered     Applied multilayered dressing below the knee to Right lower leg(s)  (4 Layer Compression Wrap ) .   Instructed patient/caregiver not to remove dressing and to keep it clean and dry.   Instructed patient/caregiver on complications to report to provider, such as pain, numbness in toes, heavy drainage, and slippage of dressing.   Instructed patient on purpose of compression dressing and on activity and exercise recommendations.     Applied per   Guidelines    Electronically signed by Darian Loya RN on 9/13/2021 at 10:14 AM

## 2021-09-13 NOTE — PROGRESS NOTES
1227 [facility]  Progress Note and Procedure Note      Rogerio Coto  MEDICAL RECORD NUMBER:  [id number]  AGE: 64 y.o. GENDER: male  : 1965  EPISODE DATE:  2021    Subjective:     Chief Complaint   Patient presents with    Wound Check     right leg         HISTORY of PRESENT ILLNESS HPI     Rogerio Coto is a 64 y.o. male who presents today for wound/ulcer evaluation. History of Wound Context: Continues follow-up for his chronic venous insufficiency with open wounds and lymphedema.  He has finished the oral antibiotics as prescribed by the infectious disease doctor. Casey Altman states there is less pain and drainage from the right lower extremity.  He claims to be using his lymphedema pumps  He has gone the entire week without having to have his compression wrap changed.  He is leaving for standard vacation in [place] in the next several days and cannot be followed subsequently in wound care for at least 4 weeks    Pain Assessment:  Wound/Ulcer Pain Timing/Severity: none  Quality of pain: N/A  Severity:  0 / 10   Modifying Factors: None  Associated Signs/Symptoms: edema    Ulcer Identification:  Ulcer Type: venous  Contributing Factors: edema, venous stasis, lymphedema, obesity and anticoagulation therapy    Objective:      BP (!) 149/83   Pulse 71   Temp 97.3 °F (36.3 °C) (Temporal)   Resp 16     Wt Readings from Last 3 Encounters:   21 243 lb 2.7 oz (110.3 kg)   21 242 lb 3.2 oz (109.9 kg)   21 243 lb (110.2 kg)       PHYSICAL EXAM    Extremities: no cyanosis, no clubbing, 1 + edema-  right lower extremity and multiple full-thickness wounds containing fibrin slough with granulation tissue and epithelialization at the margins of all the wounds involving the lateral, anterior, posterior and medial knee. Periwound remains dry and flaky    Assessment:      1. Noncompliance    2. Chronic acquired lymphedema    3.  Idiopathic chronic venous hypertension of right lower Anterior; Lower;Right #2  (Active)   Wound Image   08/30/21 0954   Wound Etiology Venous 05/03/21 1120   Wound Cleansed Cleansed with saline 09/13/21 0952   Dressing/Treatment Hydrofera blue 09/13/21 1006   Wound Length (cm) 1.5 cm 09/13/21 0952   Wound Width (cm) 1.5 cm 09/13/21 0952   Wound Depth (cm) 0.1 cm 09/13/21 0952   Wound Surface Area (cm^2) 2.25 cm^2 09/13/21 0952   Change in Wound Size % (l*w) 98.7 09/13/21 0952   Wound Volume (cm^3) 0.225 cm^3 09/13/21 0952   Wound Healing % 99 09/13/21 0952   Post-Procedure Length (cm) 1.6 cm 09/13/21 1006   Post-Procedure Width (cm) 1.6 cm 09/13/21 1006   Post-Procedure Depth (cm) 0.3 cm 09/13/21 1006   Post-Procedure Surface Area (cm^2) 2.56 cm^2 09/13/21 1006   Post-Procedure Volume (cm^3) 0.768 cm^3 09/13/21 1006   Distance Tunneling (cm) 0 cm 09/13/21 0952   Tunneling Position ___ O'Clock 0 09/07/21 1007   Undermining Starts ___ O'Clock 0 09/07/21 1007   Undermining Ends___ O'Clock 0 09/07/21 1007   Undermining Maxium Distance (cm) 0 09/13/21 0952   Wound Assessment Fibrin;Pink/red 09/13/21 0952   Drainage Amount Small 09/13/21 0952   Drainage Description Brown 09/13/21 0952   Odor Mild 09/13/21 0952   Kiah-wound Assessment Intact 09/13/21 0952   Margins Defined edges 09/13/21 0952   Wound Thickness Description not for Pressure Injury Full thickness 09/13/21 0952   Number of days: 132       Wound 05/03/21 Leg Lower;Right;Posterior #3 (Active)   Wound Image   08/30/21 0954   Wound Etiology Venous 05/03/21 1120   Wound Cleansed Cleansed with saline 09/13/21 0952   Dressing/Treatment Hydrofera blue 09/13/21 1006   Wound Length (cm) 1.5 cm 09/13/21 0952   Wound Width (cm) 1.5 cm 09/13/21 0952   Wound Depth (cm) 0.1 cm 09/13/21 0952   Wound Surface Area (cm^2) 2.25 cm^2 09/13/21 0952   Change in Wound Size % (l*w) 93.08 09/13/21 0952   Wound Volume (cm^3) 0.225 cm^3 09/13/21 0952   Wound Healing % 93 09/13/21 0952   Post-Procedure Length (cm) 1.6 cm 09/13/21 1006 Post-Procedure Width (cm) 1.6 cm 09/13/21 1006   Post-Procedure Depth (cm) 0.3 cm 09/13/21 1006   Post-Procedure Surface Area (cm^2) 2.56 cm^2 09/13/21 1006   Post-Procedure Volume (cm^3) 0.768 cm^3 09/13/21 1006   Distance Tunneling (cm) 0 cm 09/13/21 0952   Undermining Maxium Distance (cm) 0 09/13/21 0952   Wound Assessment Fibrin;Pink/red 09/13/21 0952   Drainage Amount Small 09/13/21 0952   Drainage Description Sanguinous;Brown 09/13/21 0952   Odor Mild 09/13/21 0952   Kiah-wound Assessment Intact 09/13/21 0952   Margins Defined edges 09/13/21 0952   Wound Thickness Description not for Pressure Injury Full thickness 09/13/21 0952   Number of days: 132       Wound 08/02/21 Leg Anterior; Lateral;Right #4 (Active)   Wound Image   08/30/21 0954   Wound Cleansed Cleansed with saline 09/13/21 0952   Dressing/Treatment Hydrofera blue 09/13/21 1006   Wound Length (cm) 1.5 cm 09/13/21 0952   Wound Width (cm) 1.4 cm 09/13/21 0952   Wound Depth (cm) 0.1 cm 09/13/21 0952   Wound Surface Area (cm^2) 2.1 cm^2 09/13/21 0952   Change in Wound Size % (l*w) 11.76 09/13/21 0952   Wound Volume (cm^3) 0.21 cm^3 09/13/21 0952   Wound Healing % 12 09/13/21 0952   Post-Procedure Length (cm) 1.6 cm 09/13/21 1006   Post-Procedure Width (cm) 1.5 cm 09/13/21 1006   Post-Procedure Depth (cm) 0.3 cm 09/13/21 1006   Post-Procedure Surface Area (cm^2) 2.4 cm^2 09/13/21 1006   Post-Procedure Volume (cm^3) 0.72 cm^3 09/13/21 1006   Distance Tunneling (cm) 0 cm 09/13/21 0952   Undermining Maxium Distance (cm) 0 09/13/21 0952   Wound Assessment Fibrin;Pink/red 09/13/21 0952   Drainage Amount Small 09/13/21 0952   Drainage Description Serosanguinous;Brown 09/13/21 0952   Odor Mild 09/13/21 0952   Kiah-wound Assessment Intact 09/13/21 0952   Margins Defined edges 09/13/21 0952   Wound Thickness Description not for Pressure Injury Full thickness 09/13/21 0952   Number of days: 42          Percent of Wound Debrided: 100%    Total Surface Area applied. Follow dressing application as instructed below. Kiah wound Topical Treatments:  Do not apply lotions, creams, or ointments to the skin around the wound bed unless directed as followed:     [] Apply around the wound: [] moisturizing lotion [] Antifungal ointment [] No-Sting barrier film [] Zinc paste [] Other:       Dressings:           Wound Location: right lower leg- patient will cut off compression wrap on Saturday and wear collagen with ace bandages while traveling [place] of the [place]      Apply Primary Dressing to wound:       Hydrofera Blue pad - ready   Cover and Secure with:     Cover and Secure with: 4X4 gauze pad   Avoid contact of tape with skin if possible.  When to change Dressing: [] Daily [] Every Other Day [] Once a week  [] Three times per week: [] Monday, Wednesday, Friday [] Tuesday, Thursday, Saturday  [x] Do Not Change Dressing [] Other:       Edema Control:  Apply: [x] Compression Stocking  [x] Left Lower Leg [] Right Lower Leg        Apply every morning immediately when getting up. They should be applied to affected leg(s) from mid foot to knee making sure to cover the heel. Remove every night before going to bed. [x] Elevate leg(s) above the level of the heart for 30 minutes 4-5 times a day and/or when sitting. [x] Avoid prolonged standing in one place. Multilayer Compression Wrap:    Type: 4 Layer Compression Wrap   · Applied in Clinic to the :  Right lower leg(s) on 9/13/2021  · Do not get leg(s) with wrap wet. · If wraps become too tight call the center or completely remove the wrap. · Elevate leg(s) above the level of the heart when sitting. · Avoid prolonged standing in one place. Lymphedema Therapy:  [x] Wear Lymphedema pumps twice a day at settings prescribed by your physician. [x]Elevate leg(s) above the level of the heart for 30 minutes 4-5 times a day and/or when sitting. [x] Avoid prolonged standing in one place.     Dietary:  Important dietary reminders:  1. Increase Protein intake (i.e. Lean meats, fish, eggs, legumes, and yogurt)  2. No added salt  3. If diabetic, follow a diabetic diet and check glucose prior to meals or as instructed by your physician. Dietary Supplements:  [] Papito  [] 30ml ProStat  [] EnsureEnlive [] Ensure Max/Premier  [] Other:    If you are still having pain after you go home:   For wounds on lower legs or arms, elevate the affected limb.  Use over-the-counter medications you would normally use for pain as permitted by your primary care doctor.  For persistent pain not relieved by the above interventions, please call your primary care doctor. Return Appointment:   Return Appointment: With Dr. Siddharth Barriga  in  3[facility]4 [facility])  o Scheduled weekly until (Date):      [] Return Appointment for a Wound Assessment with a nurse on:     o All other future appointments:  Future Appointments   Date Time Provider Eloina Zamarripa   9/20/2021  9:30 AM Uziel Luis MD [address] [facility]   9/27/2021  9:30 AM Uziel Luis MD [address] [facility]   10/27/2021  9:20 AM Radha Deng MD [facility]   10/27/2021 10:15 AM [facility] Angelica [facility]   -     [x]DME/Wound Dressing Supplies ordered at this visit: []Yes []No  o Supplies Provided by:   o Please call them directly to reorder supplies when you run out.  o CONTINUE TO USE THE SUPPLIES YOU HAVE AVAILABLE. IT IS MOST IMPORTANT TO KEEP THE WOUND COVERED AT ALL TIMES. [x] Orders placed during your visit:   Orders Placed This Encounter   Procedures    Initiate Outpatient Wound Care Protocol       Your nurse  is:  Enrrique Moser     Electronically signed by Jj Larsen RN on 9/13/2021 at 10:09 AM     [address] [place] [address] Information: Should you experience any significant changes in your wound(s) or have questions about your wound care, please contact the [address] [place] [address] at [phone].  We are open from 8:00am - 4:30p Monday thru Friday except for Wednesdays which we are closed. Please give us 24-48 hours to return your call. Call your doctor now or seek immediate medical care if:    · You have symptoms of infection, such as:  ? Increased pain, swelling, warmth, or redness. ? Red streaks leading from the area. ? Pus draining from the area. ? A fever.         Physician for this visit and orders: Howard Pastor MD    [] Patient unable to sign Discharge Instructions given to ECF/Transportation/POA        Electronically signed by Howard Pastor MD on 9/13/2021 at 10:23 AM

## 2021-09-13 NOTE — TELEPHONE ENCOUNTER
Requested Prescriptions     Pending Prescriptions Disp Refills    warfarin (COUMADIN) 5 MG tablet [Pharmacy Med Name: WARFARIN SODIUM 5 MG TABLET] 48 tablet 0     Sig: TAKE 1 AND 1/2 TABLET BY MOUTH DAILY EXCEPT TAKE 2 TABLETS ON FRIDAY     Last ov 12/14/18  Last lab 7/20/18

## 2021-10-13 DIAGNOSIS — Z79.01 CHRONIC ANTICOAGULATION: ICD-10-CM

## 2021-10-13 DIAGNOSIS — I82.5Z1 CHRONIC VENOUS EMBOLISM AND THROMBOSIS OF DEEP VESSELS OF DISTAL END OF RIGHT LOWER EXTREMITY (HCC): ICD-10-CM

## 2021-10-13 DIAGNOSIS — I82.423 THROMBOSIS OF BOTH ILIAC VEINS (HCC): ICD-10-CM

## 2021-10-13 DIAGNOSIS — I82.220 THROMBOSIS OF INFERIOR VENA CAVA (HCC): ICD-10-CM

## 2021-10-13 RX ORDER — WARFARIN SODIUM 5 MG/1
TABLET ORAL
Qty: 48 TABLET | Refills: 0 | Status: SHIPPED | OUTPATIENT
Start: 2021-10-13 | End: 2021-11-10

## 2021-10-27 ENCOUNTER — ANTI-COAG VISIT (OUTPATIENT)
Dept: PHARMACY | Age: 56
End: 2021-10-27
Payer: COMMERCIAL

## 2021-10-27 ENCOUNTER — OFFICE VISIT (OUTPATIENT)
Dept: INFECTIOUS DISEASES | Age: 56
End: 2021-10-27
Payer: COMMERCIAL

## 2021-10-27 VITALS
SYSTOLIC BLOOD PRESSURE: 128 MMHG | WEIGHT: 244 LBS | RESPIRATION RATE: 16 BRPM | HEART RATE: 74 BPM | BODY MASS INDEX: 38.3 KG/M2 | HEIGHT: 67 IN | DIASTOLIC BLOOD PRESSURE: 70 MMHG

## 2021-10-27 DIAGNOSIS — L97.919 IDIOPATHIC CHRONIC VENOUS HYPERTENSION OF RIGHT LOWER EXTREMITY WITH ULCER (HCC): ICD-10-CM

## 2021-10-27 DIAGNOSIS — L97.919 LEG ULCER, RIGHT, WITH UNSPECIFIED SEVERITY (HCC): Primary | ICD-10-CM

## 2021-10-27 DIAGNOSIS — M79.661 PAIN AND SWELLING OF RIGHT LOWER LEG: ICD-10-CM

## 2021-10-27 DIAGNOSIS — M79.89 PAIN AND SWELLING OF RIGHT LOWER LEG: ICD-10-CM

## 2021-10-27 DIAGNOSIS — I87.311 IDIOPATHIC CHRONIC VENOUS HYPERTENSION OF RIGHT LOWER EXTREMITY WITH ULCER (HCC): ICD-10-CM

## 2021-10-27 DIAGNOSIS — I82.5Z9 CHRONIC VENOUS EMBOLISM AND THROMBOSIS OF DEEP VESSELS OF DISTAL LOWER EXTREMITY, UNSPECIFIED LATERALITY (HCC): Primary | ICD-10-CM

## 2021-10-27 LAB — INTERNATIONAL NORMALIZATION RATIO, POC: 4.4

## 2021-10-27 PROCEDURE — 85610 PROTHROMBIN TIME: CPT | Performed by: PHARMACIST

## 2021-10-27 PROCEDURE — 99214 OFFICE O/P EST MOD 30 MIN: CPT | Performed by: INTERNAL MEDICINE

## 2021-10-27 PROCEDURE — 99212 OFFICE O/P EST SF 10 MIN: CPT | Performed by: PHARMACIST

## 2021-10-27 RX ORDER — CLINDAMYCIN HYDROCHLORIDE 300 MG/1
300 CAPSULE ORAL 3 TIMES DAILY
Qty: 42 CAPSULE | Refills: 5 | Status: SHIPPED | OUTPATIENT
Start: 2021-10-27 | End: 2021-11-10

## 2021-10-27 NOTE — PROGRESS NOTES
Infectious Diseases Follow-up Note    Reason for Consult:   R leg wound infection  Requesting Physician:   Dr Dewayne Ga  Primary Care Physician:  Gracy Ceja DO  History Obtained From:   Patient, EPIC    CHIEF COMPLAINT:      No chief complaint on file. HISTORY OF PRESENT ILLNESS:      65 yo man with hx LE DVT  Pt with chronic LE edema, L > R, chronic R LE wounds, followed at [facility]  Admit 2018, had OR debridement 18 (Black). Hx MDR Pseudomonas aeruginosa    21 - Office consult  Pain worse starting end of 2021. Assoc with increase in wound drainage  Rx doxycycline with some improvement in pain and drainage. Doxy ended on     Wound cult 5/3/21 - mod Ps aeruginosa, MSSA, C striatum  Pseudomonas aeruginosa (1)  Antibiotic Interpretation JIGNESH   cefepime Sensitive 8 mcg/mL   ciprofloxacin Resistant >2 mcg/mL   gentamicin Sensitive <=4 mcg/mL   meropenem Sensitive <=1 mcg/mL   piperacillin-tazobactam Intermediate 32 mcg/mL   tobramycin Sensitive <=4 mcg/mL     Staphylococcus aureus (2)  Antibiotic Interpretation JIGNESH   clindamycin Sensitive <=0.25 mcg/mL   erythromycin Resistant >=8 mcg/mL   oxacillin Sensitive 0.5 mcg/mL   tetracycline Sensitive <=1 mcg/mL   trimethoprim-sulfamethoxazole Sensitive <=10 mcg/mL     C/o 'foul-smelling draining wounds'  Started on Clindamycin    21 - Follow-up visit  Last seen in [facility]  - 'less pain and drainage' per note  Pt reports he is doing well and wounds smaller   Will have  Compression 'wrap' changed at [facility] weekly. Pt  (Sharon De Oliveira in [facility])    10/27/21 - Follow-up visit  [place] 3 [place]       Taking clindamycin x 1 mo                                                 Last seen in [facility]    - 'less pain and drainage' per note   - had multilayer compression wrap applied    Leg is better than last time.    'Little bit [drainage], not too much', no odor       Past Medical History:    Past Medical History: Diagnosis Date    DVT (deep venous thrombosis) (HCC)     Hx of blood clots     Pain and swelling of right lower leg        Past Surgical History:    Past Surgical History:   Procedure Laterality Date    BALLOON ANGIOPLASTY, ARTERY Right 12/19/2017    at [facility] [address] [facility] Right        Current Medications:    Current Outpatient Medications   Medication Sig Dispense Refill    warfarin (COUMADIN) 5 MG tablet TAKE 1 AND 1/2 TABLET BY MOUTH DAILY EXCEPT TAKE 2 TABLETS ON FRIDAY 48 tablet 0    clindamycin (CLEOCIN) 300 MG capsule Take 300 mg by mouth 3 times daily      Compression Stockings MISC by Does not apply route Strength 30-40mmHg  Style: Other pull up compression stockings  Extremity: bilateral  Donning aid recommended: No 1 each 0    Wound Cleansers (VASHE CLEANSING) SOLN Apply 5 mLs topically three times a week vashe wash soaked gauze 5-10 minutes  to right lower leg wounds with each dressing change 250 Bottle 0    Compression Stockings MISC by Does not apply route 40-50mmHG bilateral knee high, closed toe, custom fitted zipper stockings    ICD 10- I87.311, L97.919 1 each 3    Handicap Placard MISC by Does not apply route Dx Lower loeg DVT chronic, bilateral. Effective 2/12/2018 until 2/12/2021. 1 each 0     No current facility-administered medications for this visit. Allergies:  Patient has no known allergies. Social History:    TOBACCO:                HIWM      ETOH:                        YLHC      DRUGS:                     None      MARITAL STATUS:                 OCCUPATION:          [employer] and     Family History:   No immunodeficiency     REVIEW OF SYSTEMS:    No fever / chills / sweats. No weight loss. No visual change, eye pain, eye discharge. No oral lesion, sore throat, dysphagia. Denies cough / sputum. Denies chest pain, palpitations. Denies n / v / abd pain. No diarrhea. Denies dysuria or change in urinary function.   Denies joint swelling or pain. No myalgia, arthralgia. Denies skin changes, itching  Denies new / worse depression, psychiatric symptoms  Denies focal weakness, sensory change or other neurologic symptom  No symptoms endocrinopathy. No symptoms hematologic disease. PHYSICAL EXAM:    Vitals:  See intake vitals including weight    GENERAL: No apparent distress.     HEENT: Membranes moist, no oral lesion  NECK:  Supple  LYMPH: No adenopathy   LUNGS: Clear b/l, no rales, no dullness  CARDIAC: RRR, no murmur appreciated  ABD:  + BS, soft / NT  EXT:  R LE with dressing / compression  NEURO: No focal neurologic findings  PSYCH: Orientation, sensorium, mood normal  Wound:  LE with dressing from [address] [place] [address],[address]    Reviewed images in Epic from 7/26/21, compared to pictures from 6/16/21 2/19/18 Wound: light Ps aeruginosa  Antibiotic Interpretation JIGNESH Unit   cefepime Intermediate 16 mcg/mL   ciprofloxacin Resistant >2 mcg/mL   gentamicin Sensitive <=4 mcg/mL   meropenem Resistant >8 mcg/mL   piperacillin-tazobactam Intermediate 32 mcg/mL   tobramycin Sensitive <=4 mcg/mL      1/5/18 Wound: light Ps fluorescens/putida, Ps aeruginosa  PSEUDOMONAS FLUORESCENS PUTIDA GROUP   Antibiotic Interpretation JIGNESH Unit   cefepime Intermediate 16 mcg/mL   ciprofloxacin Resistant >2 mcg/mL   gentamicin Sensitive <=4 mcg/mL   meropenem Resistant >8 mcg/mL   piperacillin-tazobactam Sensitive <=16 mcg/mL   tobramycin Sensitive <=4 mcg/mL          PSEUDOMONAS AERUGINOSA   Antibiotic Interpretation JIGNESH Unit   cefepime Sensitive 4 mcg/mL   ciprofloxacin Resistant >2 mcg/mL   gentamicin Sensitive <=4 mcg/mL   meropenem Sensitive 2 mcg/mL   piperacillin-tazobactam Sensitive <=16 mcg/mL   tobramycin Sensitive <=4 mcg/mL          IMPRESSION    Hx DVT, LE edema  L LE chronic wounds  - OR debridement 4/18   - last cult 5/2021 with MSSA, C striatum, Ps aeruginosa   - improved with po doxycycline     6/16/21 - no cellulitis, no gross infection  7/28/21 - no infection  10/27/21 - no infection    Discussed with pt that he may iv antibiotics and / or OR debridement (use verijet) in future      RECOMMENDATIONS:      No antibiotics at this time  Cont with WCC - hydrofera blue, gauze, compression - 4 layer per note, will be changed in [facility] weekly  If increase in drainage / pain, restart clindamycin 300 tid (gram pos activity, would not have impact on Pseudomonas)  If increase in drainage - green / odor, call me (may need PICC and iv antibiotic to cover Pseudomons    F/u [facility]  R/u with me as needed    - Spent 35 minutes on visit (including history, physical exam, review of data, development and implementation of treatment plan and coordination of care. - Over 50% of time spent in pt counseling and education.     Flora Washburn MD

## 2021-10-29 ENCOUNTER — TELEPHONE (OUTPATIENT)
Dept: WOUND CARE | Age: 56
End: 2021-10-29

## 2021-10-29 DIAGNOSIS — I87.311 IDIOPATHIC CHRONIC VENOUS HYPERTENSION OF RIGHT LOWER EXTREMITY WITH ULCER (HCC): ICD-10-CM

## 2021-10-29 DIAGNOSIS — L97.919 IDIOPATHIC CHRONIC VENOUS HYPERTENSION OF RIGHT LOWER EXTREMITY WITH ULCER (HCC): ICD-10-CM

## 2021-10-29 DIAGNOSIS — I89.0 CHRONIC ACQUIRED LYMPHEDEMA: ICD-10-CM

## 2021-10-29 DIAGNOSIS — Z91.199 NONCOMPLIANCE: ICD-10-CM

## 2021-11-08 ENCOUNTER — TELEPHONE (OUTPATIENT)
Dept: WOUND CARE | Age: 56
End: 2021-11-08

## 2021-11-08 DIAGNOSIS — I89.0 CHRONIC ACQUIRED LYMPHEDEMA: ICD-10-CM

## 2021-11-08 DIAGNOSIS — Z91.199 NONCOMPLIANCE: ICD-10-CM

## 2021-11-08 DIAGNOSIS — L97.919 IDIOPATHIC CHRONIC VENOUS HYPERTENSION OF RIGHT LOWER EXTREMITY WITH ULCER (HCC): ICD-10-CM

## 2021-11-08 DIAGNOSIS — I87.311 IDIOPATHIC CHRONIC VENOUS HYPERTENSION OF RIGHT LOWER EXTREMITY WITH ULCER (HCC): ICD-10-CM

## 2021-11-10 ENCOUNTER — ANTI-COAG VISIT (OUTPATIENT)
Dept: PHARMACY | Age: 56
End: 2021-11-10
Payer: COMMERCIAL

## 2021-11-10 DIAGNOSIS — Z79.01 CHRONIC ANTICOAGULATION: ICD-10-CM

## 2021-11-10 DIAGNOSIS — I82.5Z9 CHRONIC VENOUS EMBOLISM AND THROMBOSIS OF DEEP VESSELS OF DISTAL LOWER EXTREMITY, UNSPECIFIED LATERALITY (HCC): Primary | ICD-10-CM

## 2021-11-10 DIAGNOSIS — I82.423 THROMBOSIS OF BOTH ILIAC VEINS (HCC): ICD-10-CM

## 2021-11-10 DIAGNOSIS — I82.220 THROMBOSIS OF INFERIOR VENA CAVA (HCC): ICD-10-CM

## 2021-11-10 DIAGNOSIS — I82.5Z1 CHRONIC VENOUS EMBOLISM AND THROMBOSIS OF DEEP VESSELS OF DISTAL END OF RIGHT LOWER EXTREMITY (HCC): ICD-10-CM

## 2021-11-10 LAB — INTERNATIONAL NORMALIZATION RATIO, POC: 2.4

## 2021-11-10 PROCEDURE — 85610 PROTHROMBIN TIME: CPT | Performed by: PHARMACIST

## 2021-11-10 PROCEDURE — 99211 OFF/OP EST MAY X REQ PHY/QHP: CPT | Performed by: PHARMACIST

## 2021-11-10 RX ORDER — WARFARIN SODIUM 5 MG/1
TABLET ORAL
Qty: 48 TABLET | Refills: 0 | Status: SHIPPED | OUTPATIENT
Start: 2021-11-10 | End: 2021-12-08

## 2021-11-10 NOTE — PROGRESS NOTES
ANTICOAGULATION SERVICE    Ana London is a 64 y.o. male with PMHx significant for chronic DVT (last in Jan, 2019) who presents to clinic 11/10/2021 for anticoagulation monitoring and adjustment. Patient has been taking warfarin for 15+ years.      Anticoagulation Indication(s):  DVT    Referring Physician:  Dr. George Hsieh  Goal INR Range:  2-3  Duration of Anticoagulation Therapy:  Indefinite  Time of day dose taken:  AM  Product patient has at home:  warfarin 5 mg (peach)    INR Summary                            Warfarin regimen (mg)  Date INR   A/P    Sun Mon Tue Wed Thu Fri Sat Mg/wk  11/10 2.4 At goal, continue   7.5 7.5 7.5 7.5 7.5 5 7.5 50  10/27 4.4 Above goal, hold+dec  7.5 7.5 7.5 7.5 0/7.5 5 7.5 50  8/25 2.9 At goal, continue  7.5 7.5 7.5 7.5 7.5 7.5 7.5 52.5  7/14 2.5 At goal, continue   7.5 7.5 7.5 7.5 7.5 7.5 7.5 52.5  6/14 2.9 At goal, continue  7.5 7.5 7.5 7.5 0/7.5 7.5 7.5 52.5  5/26 3.9 Above goal (ABX), hold x 1 7.5 7.5 7.5 7.5 0/7.5 7.5 7.5 52.5  3/31 2.5 At goal, no change  7.5 7.5 7.5 7.5 7.5 7.5 7.5 52.5  2/17 3.0 At goal, no change  7.5 7.5 7.5 7.5 7.5 7.5 7.5 52.5  1/6 2.8 At goal, no change  7.5 7.5 7.5 7.5 7.5 7.5 7.5 52.5  12/2 2.9 At goal, no change  7.5 7.5 7.5 7.5 7.5 7.5 7.5 52.5  11/4 3.1 Above goal, continue  7.5 7.5 7.5 7.5 7.5 7.5 7.5 52.5  10/7 2.7 At goal, no change  7.5 7.5 7.5 7.5 7.5 7.5 7.5 52.5  9/9 2.7 At goal, no change  7.5 7.5 7.5 7.5 7.5 7.5 7.5 52.5  8/17 2.8 At goal, no change  7.5 7.5 7.5 7.5 7.5 7.5 7.5 52.5  8/3 3.4 Above goal, decrease  7.5 7.5 7.5 7.5 7.5 7.5 7.5 52.5  6/8 2.9 At goal, no change  7.5 7.5 7.5 7.5 7.5 10 7.5 55  2/28 2.7 At goal, no change  7.5 7.5 7.5 7.5 7.5 10 7.5 55  1/3 2.6 At goal, no change  7.5 7.5 7.5 7.5 7.5 10 7.5 55  11/20 2.8 At goal, no change  7.5 7.5 7.5 7.5 7.5 10 7.5 55  10/23 2.6 At goal, no change  7.5 7.5 7.5 7.5 7.5 10 7.5 55  8/23 2.2 At goal, no change  7.5 7.5 7.5 7.5 7.5 10 7.5 55  7/26 2.5 At goal, no INR in 3 weeks per patient preference. Patient was reminded to maintain consistent vitamin K intake and call with any bleeding, medication changes, or fever/vomiting/diarrhea. Patient understands dosing directions and information discussed. Dosing schedule and follow up appointment given to patient. Progress note routed to referring physician's office. Patient acknowledges working in consult agreement with pharmacist as referred by his/her physician. Next Clinic Appointment:  12/1     Please call Zaira Maloney at [phone] with any questions. Thanks!   Colby Navarro, PharmD, BCPS  [facility]  [place]: [phone]  [place]: [phone]  11/10/2021 10:27 AM    For Pharmacy Admin Tracking Only  Total # of Interventions Recommended: 0  Total # of Interventions Accepted: 0  Time Spent (min): 15

## 2021-12-01 ENCOUNTER — ANTI-COAG VISIT (OUTPATIENT)
Dept: PHARMACY | Age: 56
End: 2021-12-01
Payer: COMMERCIAL

## 2021-12-01 DIAGNOSIS — I82.5Z9 CHRONIC VENOUS EMBOLISM AND THROMBOSIS OF DEEP VESSELS OF DISTAL LOWER EXTREMITY, UNSPECIFIED LATERALITY (HCC): Primary | ICD-10-CM

## 2021-12-01 LAB — INTERNATIONAL NORMALIZATION RATIO, POC: 2.9

## 2021-12-01 PROCEDURE — 99211 OFF/OP EST MAY X REQ PHY/QHP: CPT

## 2021-12-01 PROCEDURE — 85610 PROTHROMBIN TIME: CPT

## 2021-12-01 NOTE — PROGRESS NOTES
ANTICOAGULATION SERVICE    Kasie Winkler is a 64 y.o. male with PMHx significant for chronic DVT (last in Jan, 2019) who presents to clinic 12/1/2021 for anticoagulation monitoring and adjustment. Patient has been taking warfarin for 15+ years.      Anticoagulation Indication(s):  DVT    Referring Physician:  Dr. Ben Boyer  Goal INR Range:  2-3  Duration of Anticoagulation Therapy:  Indefinite  Time of day dose taken:  AM  Product patient has at home:  warfarin 5 mg (peach)    INR Summary                            Warfarin regimen (mg)  Date INR   A/P    Sun Mon Tue Wed Thu Fri Sat Mg/wk  12/1 2.9 At goal, continue  7.5 7.5 7.5 7.5 7.5 5 7.5 50  11/10 2.4 At goal, continue   7.5 7.5 7.5 7.5 7.5 5 7.5 50  10/27 4.4 Above goal, hold+dec  7.5 7.5 7.5 7.5 0/7.5 5 7.5 50  8/25 2.9 At goal, continue  7.5 7.5 7.5 7.5 7.5 7.5 7.5 52.5  7/14 2.5 At goal, continue   7.5 7.5 7.5 7.5 7.5 7.5 7.5 52.5  6/14 2.9 At goal, continue  7.5 7.5 7.5 7.5 0/7.5 7.5 7.5 52.5  5/26 3.9 Above goal (ABX), hold x 1 7.5 7.5 7.5 7.5 0/7.5 7.5 7.5 52.5  3/31 2.5 At goal, no change  7.5 7.5 7.5 7.5 7.5 7.5 7.5 52.5  2/17 3.0 At goal, no change  7.5 7.5 7.5 7.5 7.5 7.5 7.5 52.5  1/6 2.8 At goal, no change  7.5 7.5 7.5 7.5 7.5 7.5 7.5 52.5  12/2 2.9 At goal, no change  7.5 7.5 7.5 7.5 7.5 7.5 7.5 52.5  11/4 3.1 Above goal, continue  7.5 7.5 7.5 7.5 7.5 7.5 7.5 52.5  10/7 2.7 At goal, no change  7.5 7.5 7.5 7.5 7.5 7.5 7.5 52.5  9/9 2.7 At goal, no change  7.5 7.5 7.5 7.5 7.5 7.5 7.5 52.5  8/17 2.8 At goal, no change  7.5 7.5 7.5 7.5 7.5 7.5 7.5 52.5  8/3 3.4 Above goal, decrease  7.5 7.5 7.5 7.5 7.5 7.5 7.5 52.5  6/8 2.9 At goal, no change  7.5 7.5 7.5 7.5 7.5 10 7.5 55  2/28 2.7 At goal, no change  7.5 7.5 7.5 7.5 7.5 10 7.5 55  1/3 2.6 At goal, no change  7.5 7.5 7.5 7.5 7.5 10 7.5 55  11/20 2.8 At goal, no change  7.5 7.5 7.5 7.5 7.5 10 7.5 55  10/23 2.6 At goal, no change  7.5 7.5 7.5 7.5 7.5 10 7.5 55  8/23 2.2 At goal, no change  7.5 7.5 7.5 7.5 7.5 10 7.5 55  7/26 2.5 At goal, no change  7.5 7.5 7.5 7.5 7.5 10 7.5 55  6/28 2.3 At goal, no change  7.5 7.5 7.5 7.5 7.5 10 7.5 55  5/31 2.6 At goal, no change  7.5 7.5 7.5 7.5 7.5 10 7.5 55  5/1 2.5 At goal, no change  7.5 7.5 7.5 7.5 7.5 10 7.5 55  4/10 2.0 At goal, no change  7.5 7.5 7.5 7.5 7.5 10 7.5 55  3/13 2.6 At goal, no change  7.5 7.5 7.5 7.5 7.5 10 7.5 55  2/27 2.7 At goal, no change  7.5 7.5 7.5 7.5 7.5 10 7.5 55  2/20 2.9 At goal, no change  7.5 7.5 7.5 7.5 7.5 10 7.5 55  2/13 2.1 At goal, no change  7.5 7.5 7.5 7.5 7.5 10 7.5 55    Last CBC:  Lab Results   Component Value Date    RBC 3.37 (L) 04/28/2018    HGB 8.3 (L) 04/28/2018    HCT 25.4 (L) 04/28/2018    MCV 75.4 (L) 04/28/2018    MCH 24.6 (L) 04/28/2018    MPV 6.8 04/28/2018    RDW 18.3 (H) 04/28/2018     04/28/2018     Patient History:  Recent hospitalizations/HC visits 7/28/21 ID: patient will potentially need IV abx. Continue following with wound care    Recent medication changes    Medications taken regularly that may interact with warfarin or alter INR None reported   Warfarin dose taken as prescribed Yes - does not use pillbox (only takes warfarin)   Signs/symptoms of bleeding No h/o bleeding reported   Vitamin K intake Normally avoids high vitamin K foods: ~0-1 serving per week   Recent vomiting/diarrhea/fever, changes in weight or activity level None reported   Tobacco or alcohol use Patient reports smoking 0 PPD  Patient reports having 0 drinks per day   Upcoming surgeries or procedures None reported     Assessment/Plan:  Patient's INR was therapeutic today (2.9). Patient denies any missed/extra warfarin doses, diet changes, illness, bleeding, etc since last visit. Patient was instructed continue warfarin 7.5 mg daily except 5 mg on Fridays. Repeat INR in 5 weeks per patient scheudle - had recommended 4 weeks.   Patient was reminded to maintain consistent vitamin K intake and call with any bleeding, medication changes, or fever/vomiting/diarrhea. Patient understands dosing directions and information discussed. Dosing schedule and follow up appointment given to patient. Progress note routed to referring physician's office. Patient acknowledges working in consult agreement with pharmacist as referred by his/her physician. Next Clinic Appointment:  1/5    Please call [facility] at [phone] with any questions.      For Pharmacy Admin Tracking Only  Total # of Interventions Recommended: 0  Total # of Interventions Accepted: 0  Time Spent (min): 15

## 2021-12-08 DIAGNOSIS — I82.5Z1 CHRONIC VENOUS EMBOLISM AND THROMBOSIS OF DEEP VESSELS OF DISTAL END OF RIGHT LOWER EXTREMITY (HCC): ICD-10-CM

## 2021-12-08 DIAGNOSIS — I82.423 THROMBOSIS OF BOTH ILIAC VEINS (HCC): ICD-10-CM

## 2021-12-08 DIAGNOSIS — I82.220 THROMBOSIS OF INFERIOR VENA CAVA (HCC): ICD-10-CM

## 2021-12-08 DIAGNOSIS — Z79.01 CHRONIC ANTICOAGULATION: ICD-10-CM

## 2021-12-08 RX ORDER — WARFARIN SODIUM 5 MG/1
TABLET ORAL
Qty: 48 TABLET | Refills: 0 | Status: SHIPPED | OUTPATIENT
Start: 2021-12-08 | End: 2022-01-10

## 2021-12-08 NOTE — TELEPHONE ENCOUNTER
Requested Prescriptions     Pending Prescriptions Disp Refills    warfarin (COUMADIN) 5 MG tablet [Pharmacy Med Name: WARFARIN SODIUM 5 MG TABLET] 48 tablet 0     Sig: TAKE ONE AND ONE-HALF TABLET BY MOUTH DAILY EXCEPT TAKE TWO TABLETS ON FRIDAY     N/a

## 2022-01-05 ENCOUNTER — TELEPHONE (OUTPATIENT)
Dept: PHARMACY | Age: 57
End: 2022-01-05

## 2022-01-05 NOTE — TELEPHONE ENCOUNTER
Left voicemail for patient to call and reschedule visit. Had appointment on 1/5/22 @ 10:30 that he missed.

## 2022-01-06 NOTE — TELEPHONE ENCOUNTER
LVM #2 with patient.      Yadira Dudley, PharmD, BCPS  [facility]  [place]: [phone]  [place]: [phone]  1/6/2022 11:40 AM

## 2022-01-08 DIAGNOSIS — I82.220 THROMBOSIS OF INFERIOR VENA CAVA (HCC): ICD-10-CM

## 2022-01-08 DIAGNOSIS — I82.5Z1 CHRONIC VENOUS EMBOLISM AND THROMBOSIS OF DEEP VESSELS OF DISTAL END OF RIGHT LOWER EXTREMITY (HCC): ICD-10-CM

## 2022-01-08 DIAGNOSIS — Z79.01 CHRONIC ANTICOAGULATION: ICD-10-CM

## 2022-01-08 DIAGNOSIS — I82.423 THROMBOSIS OF BOTH ILIAC VEINS (HCC): ICD-10-CM

## 2022-01-10 RX ORDER — WARFARIN SODIUM 5 MG/1
TABLET ORAL
Qty: 48 TABLET | Refills: 0 | Status: SHIPPED | OUTPATIENT
Start: 2022-01-10 | End: 2022-02-07

## 2022-01-10 NOTE — TELEPHONE ENCOUNTER
Requested Prescriptions     Pending Prescriptions Disp Refills    warfarin (COUMADIN) 5 MG tablet [Pharmacy Med Name: WARFARIN SODIUM 5 MG TABLET] 48 tablet 0     Sig: TAKE 1 AND 1/2 TABLET BY MOUTH DAILY EXCEPT TAKE TWO TABLETS BY MOUTH DAILY ON FRIDAY     Last ov 12/14/18  Last lab 7/20/18

## 2022-01-12 NOTE — TELEPHONE ENCOUNTER
LVM #4 to r/s BHARGAV Yanez, PharmD  PGY1 Pharmacy Resident  [facility]  1/12/2022 12:54 PM

## 2022-01-17 NOTE — TELEPHONE ENCOUNTER
LVM #5 to r/s patient's INR check.      Sj Howard, PharmD, BCPS  [facility]  [place]: [phone]  [place]: [phone]  1/17/2022 4:54 PM

## 2022-01-24 NOTE — TELEPHONE ENCOUNTER
LVM #6 to r/s INR.      Robyn Mortimer, PharmD, BCPS  [facility]  [place]: [phone]  [place]: [phone]  1/24/2022 9:29 AM

## 2022-01-31 NOTE — TELEPHONE ENCOUNTER
Attempted to call patient again to r/s INR check. LVM.     Dari Bryson, PharmD, BCPS  [facility]  [place]: [phone]  [place]: [phone]  1/31/2022 3:04 PM  \

## 2022-02-05 DIAGNOSIS — Z79.01 CHRONIC ANTICOAGULATION: ICD-10-CM

## 2022-02-05 DIAGNOSIS — I82.220 THROMBOSIS OF INFERIOR VENA CAVA (HCC): ICD-10-CM

## 2022-02-05 DIAGNOSIS — I82.5Z1 CHRONIC VENOUS EMBOLISM AND THROMBOSIS OF DEEP VESSELS OF DISTAL END OF RIGHT LOWER EXTREMITY (HCC): ICD-10-CM

## 2022-02-05 DIAGNOSIS — I82.423 THROMBOSIS OF BOTH ILIAC VEINS (HCC): ICD-10-CM

## 2022-02-07 RX ORDER — WARFARIN SODIUM 5 MG/1
TABLET ORAL
Qty: 48 TABLET | Refills: 0 | Status: SHIPPED | OUTPATIENT
Start: 2022-02-07 | End: 2022-03-07

## 2022-02-09 NOTE — TELEPHONE ENCOUNTER
Sent NS letter #1.      Maximino Ragland, PharmD, BCPS  [facility]  [place]: [phone]  [place]: [phone]  2/9/2022 2:32 PM

## 2022-02-16 NOTE — TELEPHONE ENCOUNTER
Attempted to call patient again, no answer. LVM.      Yadira Dudley, PharmD, BCPS  [address] [facility]  [place]: [phone]  [facility]: [phone]  2/16/2022 11:42 AM Port needle left accessed for treatment. Tolerated port access and draw without complaint. Port site scrubbed with Chloraprep for 30 seconds and allowed to dry completely prior to dressing application. Accessed using sterile technique. [place] tubes drawn-Red gel/Green/Purple tubes. Double signed by patient and RN. See documentation flowsheet. Mariah Paniagua, RN, BSN, OCN

## 2022-02-24 NOTE — TELEPHONE ENCOUNTER
Attempted to call patient again today, LVM.      Andrea Pro, PharmD, [facility]  [facility]  [place]: [phone]  [place]: [phone]  2/24/2022 4:11 PM

## 2022-03-07 DIAGNOSIS — Z79.01 CHRONIC ANTICOAGULATION: ICD-10-CM

## 2022-03-07 DIAGNOSIS — I82.5Z1 CHRONIC VENOUS EMBOLISM AND THROMBOSIS OF DEEP VESSELS OF DISTAL END OF RIGHT LOWER EXTREMITY (HCC): ICD-10-CM

## 2022-03-07 DIAGNOSIS — I82.220 THROMBOSIS OF INFERIOR VENA CAVA (HCC): ICD-10-CM

## 2022-03-07 DIAGNOSIS — I82.423 THROMBOSIS OF BOTH ILIAC VEINS (HCC): ICD-10-CM

## 2022-03-07 RX ORDER — WARFARIN SODIUM 5 MG/1
TABLET ORAL
Qty: 48 TABLET | Refills: 0 | Status: SHIPPED | OUTPATIENT
Start: 2022-03-07 | End: 2022-04-18 | Stop reason: SDUPTHER

## 2022-03-07 NOTE — TELEPHONE ENCOUNTER
Requested Prescriptions     Pending Prescriptions Disp Refills    warfarin (COUMADIN) 5 MG tablet [Pharmacy Med Name: WARFARIN SODIUM 5 MG TABLET] 48 tablet 0     Sig: TAKE ONE AND ONE-HALF TABLETS BY MOUTH DAILY EXCEPT TAKE 2 TABLETS DAILY ON FRIDAY       LOV 12/14/18  No f/u

## 2022-03-09 NOTE — TELEPHONE ENCOUNTER
Attempted to call patient again. Sending NS letter #2.      Zoya Colby, PharmD, BCPS  [facility]  [place]: [phone]  [place]: [phone]  3/9/2022 4:16 PM

## 2022-03-24 NOTE — TELEPHONE ENCOUNTER
Sending NS letter #3. If I do not hear back from patient in 1 week with discharge from anticoagulation clinic.      Osiris Mcclure, PharmD, BCPS  [facility]  [place]: [phone]  [place]: [phone]  3/24/2022 4:29 PM

## 2022-04-01 NOTE — TELEPHONE ENCOUNTER
Patient called to schedule INR. Scheduled for Wed 4/6.      Malinda Beckwith, PharmD, BCPS  [facility]  [place]: [phone]  [place]: [phone]  4/1/2022 11:22 AM

## 2022-04-06 ENCOUNTER — ANTI-COAG VISIT (OUTPATIENT)
Dept: PHARMACY | Age: 57
End: 2022-04-06
Payer: COMMERCIAL

## 2022-04-06 DIAGNOSIS — I82.5Z9 CHRONIC VENOUS EMBOLISM AND THROMBOSIS OF DEEP VESSELS OF DISTAL LOWER EXTREMITY, UNSPECIFIED LATERALITY (HCC): Primary | ICD-10-CM

## 2022-04-06 LAB — INTERNATIONAL NORMALIZATION RATIO, POC: 2.8

## 2022-04-06 PROCEDURE — 85610 PROTHROMBIN TIME: CPT | Performed by: PHARMACIST

## 2022-04-06 PROCEDURE — 99211 OFF/OP EST MAY X REQ PHY/QHP: CPT | Performed by: PHARMACIST

## 2022-04-06 NOTE — PROGRESS NOTES
ANTICOAGULATION SERVICE    Leta Santo is a 64 y.o. male with PMHx significant for chronic DVT (last in Jan, 2019) who presents to clinic 4/6/2022 for anticoagulation monitoring and adjustment. Patient has been taking warfarin for 15+ years.      Anticoagulation Indication(s):  DVT    Referring Physician:  Dr. Velia Lazo  Goal INR Range:  2-3  Duration of Anticoagulation Therapy:  Indefinite  Time of day dose taken:  AM  Product patient has at home:  warfarin 5 mg (peach)    INR Summary                            Warfarin regimen (mg)  Date INR   A/P    Sun Mon Tue Wed Thu Fri Sat Mg/wk  4/6 2.8 At goal, continue  7.5 7.5 7.5 7.5 7.5 5 7.5 50  12/1 2.9 At goal, continue  7.5 7.5 7.5 7.5 7.5 5 7.5 50  11/10 2.4 At goal, continue   7.5 7.5 7.5 7.5 7.5 5 7.5 50  10/27 4.4 Above goal, hold+dec  7.5 7.5 7.5 7.5 0/7.5 5 7.5 50  8/25 2.9 At goal, continue  7.5 7.5 7.5 7.5 7.5 7.5 7.5 52.5  7/14 2.5 At goal, continue   7.5 7.5 7.5 7.5 7.5 7.5 7.5 52.5  6/14 2.9 At goal, continue  7.5 7.5 7.5 7.5 0/7.5 7.5 7.5 52.5  5/26 3.9 Above goal (ABX), hold x 1 7.5 7.5 7.5 7.5 0/7.5 7.5 7.5 52.5  3/31 2.5 At goal, no change  7.5 7.5 7.5 7.5 7.5 7.5 7.5 52.5  2/17 3.0 At goal, no change  7.5 7.5 7.5 7.5 7.5 7.5 7.5 52.5  1/6 2.8 At goal, no change  7.5 7.5 7.5 7.5 7.5 7.5 7.5 52.5  12/2 2.9 At goal, no change  7.5 7.5 7.5 7.5 7.5 7.5 7.5 52.5  11/4 3.1 Above goal, continue  7.5 7.5 7.5 7.5 7.5 7.5 7.5 52.5  10/7 2.7 At goal, no change  7.5 7.5 7.5 7.5 7.5 7.5 7.5 52.5  9/9 2.7 At goal, no change  7.5 7.5 7.5 7.5 7.5 7.5 7.5 52.5  8/17 2.8 At goal, no change  7.5 7.5 7.5 7.5 7.5 7.5 7.5 52.5  8/3 3.4 Above goal, decrease  7.5 7.5 7.5 7.5 7.5 7.5 7.5 52.5  6/8 2.9 At goal, no change  7.5 7.5 7.5 7.5 7.5 10 7.5 55  2/28 2.7 At goal, no change  7.5 7.5 7.5 7.5 7.5 10 7.5 55  1/3 2.6 At goal, no change  7.5 7.5 7.5 7.5 7.5 10 7.5 55  11/20 2.8 At goal, no change  7.5 7.5 7.5 7.5 7.5 10 7.5 55  10/23 2.6 At goal, no change  7.5 7.5 7.5 7.5 7.5 10 7.5 55  8/23 2.2 At goal, no change  7.5 7.5 7.5 7.5 7.5 10 7.5 55  7/26 2.5 At goal, no change  7.5 7.5 7.5 7.5 7.5 10 7.5 55  6/28 2.3 At goal, no change  7.5 7.5 7.5 7.5 7.5 10 7.5 55  5/31 2.6 At goal, no change  7.5 7.5 7.5 7.5 7.5 10 7.5 55  5/1 2.5 At goal, no change  7.5 7.5 7.5 7.5 7.5 10 7.5 55  4/10 2.0 At goal, no change  7.5 7.5 7.5 7.5 7.5 10 7.5 55  3/13 2.6 At goal, no change  7.5 7.5 7.5 7.5 7.5 10 7.5 55  2/27 2.7 At goal, no change  7.5 7.5 7.5 7.5 7.5 10 7.5 55  2/20 2.9 At goal, no change  7.5 7.5 7.5 7.5 7.5 10 7.5 55  2/13 2.1 At goal, no change  7.5 7.5 7.5 7.5 7.5 10 7.5 55    Last CBC:  Lab Results   Component Value Date    RBC 3.37 (L) 04/28/2018    HGB 8.3 (L) 04/28/2018    HCT 25.4 (L) 04/28/2018    MCV 75.4 (L) 04/28/2018    MCH 24.6 (L) 04/28/2018    MPV 6.8 04/28/2018    RDW 18.3 (H) 04/28/2018     04/28/2018     Patient History:  Recent hospitalizations/HC visits 7/28/21 ID: patient will potentially need IV abx. Continue following with wound care    Recent medication changes    Medications taken regularly that may interact with warfarin or alter INR None reported   Warfarin dose taken as prescribed Yes - does not use pillbox (only takes warfarin)   Signs/symptoms of bleeding No h/o bleeding reported   Vitamin K intake Normally avoids high vitamin K foods: ~0-1 serving per week   Recent vomiting/diarrhea/fever, changes in weight or activity level None reported   Tobacco or alcohol use Patient reports smoking 0 PPD  Patient reports having 0 drinks per day   Upcoming surgeries or procedures None reported     Assessment/Plan:  Patient's INR was therapeutic today (2.8). It has been >3 months since patient was at the clinic for INR check. Today patient reports he had been busy and was in the process of switching insurance so did not reach otu to schedule.  Patient denies any missed/extra warfarin doses, diet changes, illness, bleeding, etc since last visit. Patient was instructed continue warfarin 7.5 mg daily except 5 mg on Fridays. Repeat INR in 6 weeks per patient preference. Patient was reminded to maintain consistent vitamin K intake and call with any bleeding, medication changes, or fever/vomiting/diarrhea. Patient understands dosing directions and information discussed. Dosing schedule and follow up appointment given to patient. Progress note routed to referring physician's office. Patient acknowledges working in consult agreement with pharmacist as referred by his/her physician. Next Clinic Appointment:  5/18    Robyn Mortimer, PharmD, BCPS  [facility]  [place]: [phone]  [facility]: [phone]  4/6/2022 10:39 AM      Please call [facility] at [phone] with any questions.      For Pharmacy Admin Tracking Only  Total # of Interventions Recommended: 0  Total # of Interventions Accepted: 0  Time Spent (min): 15

## 2022-04-18 ENCOUNTER — OFFICE VISIT (OUTPATIENT)
Dept: FAMILY MEDICINE CLINIC | Age: 57
End: 2022-04-18
Payer: COMMERCIAL

## 2022-04-18 VITALS
HEIGHT: 67 IN | HEART RATE: 100 BPM | BODY MASS INDEX: 39.46 KG/M2 | DIASTOLIC BLOOD PRESSURE: 80 MMHG | WEIGHT: 251.4 LBS | OXYGEN SATURATION: 97 % | SYSTOLIC BLOOD PRESSURE: 120 MMHG

## 2022-04-18 DIAGNOSIS — Z79.01 CHRONIC ANTICOAGULATION: ICD-10-CM

## 2022-04-18 DIAGNOSIS — L97.909 VENOUS ULCER (HCC): ICD-10-CM

## 2022-04-18 DIAGNOSIS — Z13.220 SCREENING FOR LIPID DISORDERS: ICD-10-CM

## 2022-04-18 DIAGNOSIS — I82.5Z1 CHRONIC VENOUS EMBOLISM AND THROMBOSIS OF DEEP VESSELS OF DISTAL END OF RIGHT LOWER EXTREMITY (HCC): Primary | ICD-10-CM

## 2022-04-18 DIAGNOSIS — I87.2 STASIS DERMATITIS OF BOTH LEGS: ICD-10-CM

## 2022-04-18 DIAGNOSIS — I82.220 THROMBOSIS OF INFERIOR VENA CAVA (HCC): ICD-10-CM

## 2022-04-18 DIAGNOSIS — I83.009 VENOUS ULCER (HCC): ICD-10-CM

## 2022-04-18 DIAGNOSIS — I82.423 THROMBOSIS OF BOTH ILIAC VEINS (HCC): ICD-10-CM

## 2022-04-18 DIAGNOSIS — I89.0 CHRONIC ACQUIRED LYMPHEDEMA: ICD-10-CM

## 2022-04-18 PROCEDURE — 99214 OFFICE O/P EST MOD 30 MIN: CPT | Performed by: FAMILY MEDICINE

## 2022-04-18 RX ORDER — CLINDAMYCIN HYDROCHLORIDE 300 MG/1
CAPSULE ORAL
COMMUNITY
Start: 2022-04-10 | End: 2022-05-09 | Stop reason: ALTCHOICE

## 2022-04-18 RX ORDER — WARFARIN SODIUM 5 MG/1
TABLET ORAL
Qty: 180 TABLET | Refills: 1 | Status: SHIPPED | OUTPATIENT
Start: 2022-04-18

## 2022-04-18 SDOH — ECONOMIC STABILITY: FOOD INSECURITY: WITHIN THE PAST 12 MONTHS, THE FOOD YOU BOUGHT JUST DIDN'T LAST AND YOU DIDN'T HAVE MONEY TO GET MORE.: NEVER TRUE

## 2022-04-18 SDOH — ECONOMIC STABILITY: FOOD INSECURITY: WITHIN THE PAST 12 MONTHS, YOU WORRIED THAT YOUR FOOD WOULD RUN OUT BEFORE YOU GOT MONEY TO BUY MORE.: NEVER TRUE

## 2022-04-18 ASSESSMENT — PATIENT HEALTH QUESTIONNAIRE - PHQ9
SUM OF ALL RESPONSES TO PHQ QUESTIONS 1-9: 0
SUM OF ALL RESPONSES TO PHQ QUESTIONS 1-9: 0
2. FEELING DOWN, DEPRESSED OR HOPELESS: 0
SUM OF ALL RESPONSES TO PHQ QUESTIONS 1-9: 0
1. LITTLE INTEREST OR PLEASURE IN DOING THINGS: 0
SUM OF ALL RESPONSES TO PHQ9 QUESTIONS 1 & 2: 0
SUM OF ALL RESPONSES TO PHQ QUESTIONS 1-9: 0

## 2022-04-18 ASSESSMENT — SOCIAL DETERMINANTS OF HEALTH (SDOH): HOW HARD IS IT FOR YOU TO PAY FOR THE VERY BASICS LIKE FOOD, HOUSING, MEDICAL CARE, AND HEATING?: NOT HARD AT ALL

## 2022-04-18 NOTE — PROGRESS NOTES
Portions of this chart may have been created with voice recognition software. Occasional wrong-word or \"sound-alike\" substitutions may have occurred due to the inherent limitations of voice recognition software. Read the chart carefully and recognize, using context, where these substitutions have occurred        Chief Complaint     Establish Care (needs refill on Warfarin)               Cheko Watson is a 64 y.o. male here for establishing care with me in follow-up of his chronic medical problems. Patient has a longstanding history of venous insufficiency in bilateral lower extremities, chronic acquired lymphedema, venous stasis ulcers in the right lower extremity. He has a history of DVT of both lower extremities status post inferior vena cava placement, with chronic anticoagulation on Coumadin. He has had consults with wound clinic as well as been under the care of vascular surgery in the past.  Patient was following up with the wound center up until last year when he was no longer able to afford the co-pays visits. He now does the dressings on his own. He does collagen sometimes, followed by some ABD pads and 4 layer compression wraps. He does get his own supplies from pharmacies. He is hesitant to go back to the wound center at this time. I was not able to examine the wounds today as he had his wrap for the day already. But he did show a picture of his wounds that he has taken in his cell phone which shows a necrotic eschar, some granulation tissue and lots of necrotic tissue that needs debridement. Commended at this time to at least continue wearing the compression pumps at least for half hour to 1 hour. He was requested to call back if he decides to set up an appointment wound care or he can continue to follow-up with me wound for wound care to see what stages the wounds are currently and if it needs further evaluation work-up. He has had graft placements as well.   For now continue with current management and call back for wound care. Has not been using his compression pumps at home as well. Not been following the lifestyle recommendations as recommended previously by his wound care team.  He states it is difficult to manage work and balance his health at this time. He has itching in his right lower extremity secondary to the dermatitis and requests a topical medication for the same. No other significant questions or concerns other than these              REVIEW OF SYSTEMS:  CONSTITUTIONAL: No weight loss, fever, weakness or fatigue. HEENT positive for occasional runny nose secondary to allergies. No vision or hearing disturbances   CARDIOVASCULAR: No chest pain,No palpitations or edema. RESPIRATORY : No shortness of breath, cough. GASTROINTESTINAL: No nausea, vomiting, diarrhea or constipation. No abdominal pain. GENITOURINARY:No dysuria, urgency, frequency, hematuria. NEUROLOGICAL: No headache, dizziness or syncope. MUSCULOSKELETAL: No muscle, back pain, joint pain or stiffness.    PSYCHIATRIC : No mood changes      Lab Results   Component Value Date    WBC 7.6 04/28/2018    HGB 8.3 (L) 04/28/2018    HCT 25.4 (L) 04/28/2018    MCV 75.4 (L) 04/28/2018     04/28/2018      No results found for: LABA1C  No results found for: EAG  No results found for: TSHFT4, TSH  Lab Results   Component Value Date    CHOL 180 03/07/2017     Lab Results   Component Value Date    TRIG 118 03/07/2017     Lab Results   Component Value Date    HDL 39 (L) 03/07/2017     Lab Results   Component Value Date    LDLCALC 117 (H) 03/07/2017     Lab Results   Component Value Date    LABVLDL 24 03/07/2017     No results found for: [facility]   Lab Results   Component Value Date     07/20/2018    K 5.0 07/20/2018     07/20/2018    CO2 24 07/20/2018    BUN 17 07/20/2018    CREATININE 1.0 07/20/2018    GLUCOSE 94 07/20/2018    CALCIUM 9.1 07/20/2018    PROT 7.1 07/20/2018    LABALBU 4.4 07/20/2018 BILITOT 0.4 07/20/2018    ALKPHOS 81 07/20/2018    AST 12 (L) 07/20/2018    ALT 12 07/20/2018    LABGLOM >60 07/20/2018    GFRAA >60 07/20/2018    AGRATIO 1.6 07/20/2018    GLOB 2.7 07/20/2018           Current Outpatient Medications   Medication Sig Dispense Refill    clindamycin (CLEOCIN) 300 MG capsule       triamcinolone (KENALOG) 0.1 % ointment Apply topically 2 times daily 453.6 g 1    warfarin (COUMADIN) 5 MG tablet TAKE ONE AND ONE-HALF TABLETS BY MOUTH DAILY EXCEPT TAKE 2 TABLETS DAILY ON FRIDAY 180 tablet 1    Compression Stockings MISC by Does not apply route Strength 30-40mmHg  Style: Other pull up compression stockings  Extremity: bilateral  Donning aid recommended: No 1 each 0     No current facility-administered medications for this visit.        No Known Allergies      Past Medical History:   Diagnosis Date    DVT (deep venous thrombosis) (HCC)     Hx of blood clots     Pain and swelling of right lower leg          Past Surgical History:   Procedure Laterality Date    BALLOON ANGIOPLASTY, ARTERY Right 12/19/2017    at [place] [address]. Right          Family History   Problem Relation Age of Onset    Diabetes Mother     Heart Attack Father         Social History     Socioeconomic History    Marital status:      Spouse name: None    Number of children: None    Years of education: None    Highest education level: None   Occupational History    None   Tobacco Use    Smoking status: Never Smoker    Smokeless tobacco: Never Used   Vaping Use    Vaping Use: Never used   Substance and Sexual Activity    Alcohol use: No    Drug use: No    Sexual activity: Yes   Other Topics Concern    None   Social History Narrative    None     Social Determinants of Health     Financial Resource Strain: Low Risk     Difficulty of Paying Living Expenses: Not hard at all   Food Insecurity: No Food Insecurity    Worried About Running Out of Food in the Last Year: Never true  Ran Out of Food in the Last Year: Never true   Transportation Needs:     Lack of Transportation (Medical): Not on file    Lack of Transportation (Non-Medical): Not on file   Physical Activity:     Days of Exercise per Week: Not on file    Minutes of Exercise per Session: Not on file   Stress:     Feeling of Stress : Not on file   Social Connections:     Frequency of Communication with Friends and Family: Not on file    Frequency of Social Gatherings with Friends and Family: Not on file    Attends Zoroastrian Services: Not on file    Active Member of [address] [place] [address] or Organizations: Not on file    Attends Club or Organization Meetings: Not on file    Marital Status: Not on file   Intimate Partner Violence:     Fear of Current or Ex-Partner: Not on file    Emotionally Abused: Not on file    Physically Abused: Not on file    Sexually Abused: Not on file   Housing Stability:     Unable to Pay for Housing in the Last Year: Not on file    Number of [place] in the Last Year: Not on file    Unstable Housing in the Last Year: Not on file          OBJECTIVE:      Vitals:    04/18/22 1041   BP: 120/80   Pulse: 100   SpO2: 97%   Weight: 251 lb 6.4 oz (114 kg)   Height: 5' 7\" (1.702 m)         Constitutional: Patient appears well-nourished, not in any distress. HENT:  Head: Normocephalic and atraumatic. Eyes: Conjunctivae and EOM are normal  Right Ear: External ear normal.  Left Ear: External ear normal.   Nose: Nose normal.  Mouth/Throat: Oropharynx is clear and moist.   Neck: Normal range of motion. Neck supple. Lymphatic: No cervical lymphadenopathy  Cardiovascular: Normal rate, regular rhythm, normal heart sounds  Pulmonary/Chest: Effort normal and breath sounds normal, no wheezes or rhonchi. Neurological:Patient is alert, oriented to person, place, and time.   No obvious focal neurological deficits  Skin: Skin is warm and moist.  Psychiatric:Patient has a normal mood and affect, behavior is normal. Judgment and thought content normal.    ASSESSMENT AND PLAN    Palmira Dodson was seen today for establish care. Diagnoses and all orders for this visit:    Chronic venous embolism and thrombosis of deep vessels of distal end of right lower extremity (HCC)  -     warfarin (COUMADIN) 5 MG tablet; TAKE ONE AND ONE-HALF TABLETS BY MOUTH DAILY EXCEPT TAKE 2 TABLETS DAILY ON FRIDAY  -     CBC with Auto Differential; Future  -     Comprehensive Metabolic Panel; Future    Thrombosis of both iliac veins (HCC)  -     warfarin (COUMADIN) 5 MG tablet; TAKE ONE AND ONE-HALF TABLETS BY MOUTH DAILY EXCEPT TAKE 2 TABLETS DAILY ON FRIDAY    Thrombosis of inferior vena cava (HCC)  -     warfarin (COUMADIN) 5 MG tablet; TAKE ONE AND ONE-HALF TABLETS BY MOUTH DAILY EXCEPT TAKE 2 TABLETS DAILY ON FRIDAY    Chronic anticoagulation  -     warfarin (COUMADIN) 5 MG tablet; TAKE ONE AND ONE-HALF TABLETS BY MOUTH DAILY EXCEPT TAKE 2 TABLETS DAILY ON FRIDAY    Venous ulcer ([address]  -     CBC with Auto Differential; Future  -     Comprehensive Metabolic Panel; Future    Chronic acquired lymphedema    Stasis dermatitis of both legs  -     TSH; Future    Screening for lipid disorders  -     Lipid Panel; Future    Other orders  -     triamcinolone (KENALOG) 0.1 % ointment; Apply topically 2 times daily           DISCHARGE MEDICATION LIST        Medication List          Accurate as of April 18, 2022 11:14 AM. If you have any questions, ask your nurse or doctor. START taking these medications    triamcinolone 0.1 % ointment  Commonly known as: KENALOG  Apply topically 2 times daily  Started by: Dinorah Dejesus MD        CONTINUE taking these medications    clindamycin 300 MG capsule  Commonly known as: CLEOCIN     Compression Stockings Misc  by Does not apply route Strength 30-40mmHg  Style:  Other pull up compression stockings  Extremity: bilateral  Donning aid recommended: No     warfarin 5 MG tablet  Commonly known as: COUMADIN  Take as directed by the anticoagulation clinic. If you are unsure how to take this medication, talk to your nurse or doctor. Original instructions: TAKE ONE AND ONE-HALF TABLETS BY MOUTH DAILY EXCEPT TAKE 2 TABLETS DAILY ON FRIDAY           Where to Get Your Medications      These medications were sent to [facility] [id number] [facility] [phone]  [address] [place] [address]    Phone: [phone]   · triamcinolone 0.1 % ointment  · warfarin 5 MG tablet          Return in about 6 months (around 10/18/2022), or if symptoms worsen or fail to improve. Please refer to diagnosis, orders and patient instructions for further recommendations given. All patient's questions and concerns were addressed appropriately. All orders, handouts were reviewed in detail with the patient and patient voiced understanding verbally.

## 2022-04-25 ENCOUNTER — OFFICE VISIT (OUTPATIENT)
Dept: FAMILY MEDICINE CLINIC | Age: 57
End: 2022-04-25
Payer: COMMERCIAL

## 2022-04-25 VITALS
BODY MASS INDEX: 39.46 KG/M2 | OXYGEN SATURATION: 96 % | WEIGHT: 251.4 LBS | HEART RATE: 85 BPM | HEIGHT: 67 IN | SYSTOLIC BLOOD PRESSURE: 118 MMHG | DIASTOLIC BLOOD PRESSURE: 72 MMHG | TEMPERATURE: 98.3 F

## 2022-04-25 DIAGNOSIS — I83.019 VENOUS ULCER OF RIGHT LEG (HCC): Primary | ICD-10-CM

## 2022-04-25 DIAGNOSIS — I83.012 VENOUS STASIS ULCER OF RIGHT CALF WITH FAT LAYER EXPOSED WITH VARICOSE VEINS (HCC): ICD-10-CM

## 2022-04-25 DIAGNOSIS — L97.919 VENOUS ULCER OF RIGHT LEG (HCC): Primary | ICD-10-CM

## 2022-04-25 DIAGNOSIS — I89.0 CHRONIC ACQUIRED LYMPHEDEMA: ICD-10-CM

## 2022-04-25 DIAGNOSIS — L97.212 VENOUS STASIS ULCER OF RIGHT CALF WITH FAT LAYER EXPOSED WITH VARICOSE VEINS (HCC): ICD-10-CM

## 2022-04-25 PROCEDURE — 99213 OFFICE O/P EST LOW 20 MIN: CPT | Performed by: FAMILY MEDICINE

## 2022-04-25 RX ORDER — CLINDAMYCIN HYDROCHLORIDE 300 MG/1
300 CAPSULE ORAL 3 TIMES DAILY
Qty: 21 CAPSULE | Refills: 0 | Status: SHIPPED | OUTPATIENT
Start: 2022-04-25 | End: 2022-05-02

## 2022-04-25 ASSESSMENT — PATIENT HEALTH QUESTIONNAIRE - PHQ9
SUM OF ALL RESPONSES TO PHQ QUESTIONS 1-9: 0
SUM OF ALL RESPONSES TO PHQ QUESTIONS 1-9: 0
2. FEELING DOWN, DEPRESSED OR HOPELESS: 0
SUM OF ALL RESPONSES TO PHQ9 QUESTIONS 1 & 2: 0
SUM OF ALL RESPONSES TO PHQ QUESTIONS 1-9: 0
1. LITTLE INTEREST OR PLEASURE IN DOING THINGS: 0
SUM OF ALL RESPONSES TO PHQ QUESTIONS 1-9: 0

## 2022-04-25 NOTE — PROGRESS NOTES
Portions of this chart may have been created with voice recognition software. Occasional wrong-word or \"sound-alike\" substitutions may have occurred due to the inherent limitations of voice recognition software. Read the chart carefully and recognize, using context, where these substitutions have occurred        Chief Complaint     Other (wound care)               Eli Swain is a 64 y.o. male here for follow-up of his wound. 1. Venous ulcer of right leg (HCC)  - Culture, Aerobic and Anaerobic    2. Chronic acquired lymphedema    3. Venous stasis ulcer of right calf with fat layer exposed with varicose veins ([address]  Patient could not follow-up with the wound care because of financial constraints and does not her today to follow-up and see what he needs to do as far as his wound care is concerned. Patient does states that he has not been persistent or compliant in doing wound care as recommended by the wound care physicians in the past.  He uses collagen occasionally, alginate occasionally and sometimes, multilayer compression wrap but mostly does wrapping on his own without much compression. .  Not been using his lymphedema pumps at home as recommended. Attempted to do wound care today in the clinic however wounds are extensive as compared to the wounds that were present in past September, please refer to the pictures attached to the chart of this encounter. Cultures taken. Foul-smelling discharge, purulent, greenish colored wounds seen. Some wounds are in continuity some of them are not. 3 distinct wounds can be seen. Cleaned wound with normal saline today applied Hydrofera Blue, ABD pads and a multi layer compression wraps today. Sent prescription for clindamycin and contacted wound center for further evaluation management. Discussed this case with the clinic manager and organization will get him financial help for follow-ups with his wound center.       REVIEW OF SYSTEMS:  Pertinent symptoms noted in HPI      Lab Results   Component Value Date    WBC 7.6 04/28/2018    HGB 8.3 (L) 04/28/2018    HCT 25.4 (L) 04/28/2018    MCV 75.4 (L) 04/28/2018     04/28/2018      No results found for: LABA1C  No results found for: EAG  No results found for: TSHFT4, TSH  Lab Results   Component Value Date    CHOL 180 03/07/2017     Lab Results   Component Value Date    TRIG 118 03/07/2017     Lab Results   Component Value Date    HDL 39 (L) 03/07/2017     Lab Results   Component Value Date    LDLCALC 117 (H) 03/07/2017     Lab Results   Component Value Date    LABVLDL 24 03/07/2017     No results found for: [facility]   Lab Results   Component Value Date     07/20/2018    K 5.0 07/20/2018     07/20/2018    CO2 24 07/20/2018    BUN 17 07/20/2018    CREATININE 1.0 07/20/2018    GLUCOSE 94 07/20/2018    CALCIUM 9.1 07/20/2018    PROT 7.1 07/20/2018    LABALBU 4.4 07/20/2018    BILITOT 0.4 07/20/2018    ALKPHOS 81 07/20/2018    AST 12 (L) 07/20/2018    ALT 12 07/20/2018    LABGLOM >60 07/20/2018    GFRAA >60 07/20/2018    AGRATIO 1.6 07/20/2018    GLOB 2.7 07/20/2018           Current Outpatient Medications   Medication Sig Dispense Refill    clindamycin (CLEOCIN) 300 MG capsule Take 1 capsule by mouth 3 times daily for 7 days 21 capsule 0    clindamycin (CLEOCIN) 300 MG capsule       triamcinolone (KENALOG) 0.1 % ointment Apply topically 2 times daily 453.6 g 1    warfarin (COUMADIN) 5 MG tablet TAKE ONE AND ONE-HALF TABLETS BY MOUTH DAILY EXCEPT TAKE 2 TABLETS DAILY ON FRIDAY 180 tablet 1    Compression Stockings MISC by Does not apply route Strength 30-40mmHg  Style: Other pull up compression stockings  Extremity: bilateral  Donning aid recommended: No 1 each 0     No current facility-administered medications for this visit.        No Known Allergies      Past Medical History:   Diagnosis Date    DVT (deep venous thrombosis) (HCC)     Hx of blood clots     Pain and swelling of right lower leg          Past Surgical History:   Procedure Laterality Date    BALLOON ANGIOPLASTY, ARTERY Right 12/19/2017    at [facility] [address] [facility] Right          Family History   Problem Relation Age of Onset    Diabetes Mother     Heart Attack Father         Social History     Socioeconomic History    Marital status:      Spouse name: None    Number of children: None    Years of education: None    Highest education level: None   Occupational History    None   Tobacco Use    Smoking status: Never Smoker    Smokeless tobacco: Never Used   Vaping Use    Vaping Use: Never used   Substance and Sexual Activity    Alcohol use: No    Drug use: No    Sexual activity: Yes   Other Topics Concern    None   Social History Narrative    None     Social Determinants of Health     Financial Resource Strain: Low Risk     Difficulty of Paying Living Expenses: Not hard at all   Food Insecurity: No Food Insecurity    Worried About Running Out of Food in the Last Year: Never true    [place]erve of Food in the Last Year: Never true   Transportation Needs:     Lack of Transportation (Medical): Not on file    Lack of Transportation (Non-Medical):  Not on file   Physical Activity:     Days of Exercise per Week: Not on file    Minutes of Exercise per Session: Not on file   Stress:     Feeling of Stress : Not on file   Social Connections:     Frequency of Communication with Friends and Family: Not on file    Frequency of Social Gatherings with Friends and Family: Not on file    Attends Anabaptist Services: Not on file    Active Member of Clubs or Organizations: Not on file    Attends Club or Organization Meetings: Not on file    Marital Status: Not on file   Intimate Partner Violence:     Fear of Current or Ex-Partner: Not on file    Emotionally Abused: Not on file    Physically Abused: Not on file    Sexually Abused: Not on file   Housing Stability:     Unable to Pay for Housing in the Last Year: Not on file    Number of Places Lived in the Last Year: Not on file    Unstable Housing in the Last Year: Not on file          OBJECTIVE:      Vitals:    04/25/22 1309   BP: 118/72   Pulse: 85   Temp: 98.3 °F (36.8 °C)   TempSrc: Temporal   SpO2: 96%   Weight: 251 lb 6.4 oz (114 kg)   Height: 5' 7\" (1.702 m)         Constitutional: Patient appears well-nourished, not in any distress. HENT:  Head: Normocephalic and atraumatic. Eyes: Conjunctivae and EOM are normal  Right Ear: External ear normal.  Left Ear: External ear normal.   Nose: Nose normal.  Neurological:Patient is alert, oriented to person, place, and time. No obvious focal neurological deficits  Skin: Skin is warm and moist.  Psychiatric:Patient has a normal mood and affect, behavior is normal. Judgment and thought content normal.    ASSESSMENT AND PLAN    Ar Felix was seen today for other. Diagnoses and all orders for this visit:    Venous ulcer of right leg (HCC)  -     Culture, Aerobic and Anaerobic    Chronic acquired lymphedema    Venous stasis ulcer of right calf with fat layer exposed with varicose veins ([address]    Other orders  -     clindamycin (CLEOCIN) 300 MG capsule; Take 1 capsule by mouth 3 times daily for 7 days           DISCHARGE MEDICATION LIST        Medication List          Accurate as of April 25, 2022  2:53 PM. If you have any questions, ask your nurse or doctor. CHANGE how you take these medications    * clindamycin 300 MG capsule  Commonly known as: CLEOCIN  What changed: Another medication with the same name was added. Make sure you understand how and when to take each. Changed by: Mode Genao MD     * clindamycin 300 MG capsule  Commonly known as: CLEOCIN  Take 1 capsule by mouth 3 times daily for 7 days  What changed: You were already taking a medication with the same name, and this prescription was added. Make sure you understand how and when to take each.   Changed by: Mode Genao MD         * This list has 2 medication(s) that are the same as other medications prescribed for you. Read the directions carefully, and ask your doctor or other care provider to review them with you. CONTINUE taking these medications    Compression Stockings Misc  by Does not apply route Strength 30-40mmHg  Style: Other pull up compression stockings  Extremity: bilateral  Donning aid recommended: No     triamcinolone 0.1 % ointment  Commonly known as: KENALOG  Apply topically 2 times daily     warfarin 5 MG tablet  Commonly known as: COUMADIN  Take as directed by the anticoagulation clinic. If you are unsure how to take this medication, talk to your nurse or doctor. Original instructions: TAKE ONE AND ONE-HALF TABLETS BY MOUTH DAILY EXCEPT TAKE 2 TABLETS DAILY ON FRIDAY           Where to Get Your Medications      These medications were sent to [facility] [id number] [facility], [address], [place] [id number]    Phone: [phone]   · clindamycin 300 MG capsule          Return if symptoms worsen or fail to improve. Please refer to diagnosis, orders and patient instructions for further recommendations given. All patient's questions and concerns were addressed appropriately. All orders, handouts were reviewed in detail with the patient and patient voiced understanding verbally.

## 2022-04-26 ENCOUNTER — TELEPHONE (OUTPATIENT)
Dept: FAMILY MEDICINE CLINIC | Age: 57
End: 2022-04-26

## 2022-04-26 NOTE — TELEPHONE ENCOUNTER
Left message for patient to call back to discuss the [facility].       Patient should call [id number] [facility] representative Noy Ghotra at [phone]. Ms Adry Smith will assist the patient in applying for the Partnership Program, to assist patient with co-payments and coinsurance. Mailed [employer] Program flyer to patients home.

## 2022-04-27 NOTE — TELEPHONE ENCOUNTER
Also Please make sure he has an appointment with wound center within a week for wound care and compression wrap change

## 2022-04-28 ENCOUNTER — TELEPHONE (OUTPATIENT)
Dept: FAMILY MEDICINE CLINIC | Age: 57
End: 2022-04-28

## 2022-04-28 NOTE — TELEPHONE ENCOUNTER
Left message for patient that he is scheduled for wound care at 9:30am on Monday 5/2/2022 at the [facility], located at [facility], phone number [phone]. The practice manager is [employer]. Patient was given information to call [facility] Leilani Avilez at [phone], to apply for financial assistance.

## 2022-04-29 LAB
GRAM STAIN RESULT: ABNORMAL
ORGANISM: ABNORMAL
WOUND/ABSCESS: ABNORMAL

## 2022-05-02 ENCOUNTER — OFFICE VISIT (OUTPATIENT)
Dept: INTERNAL MEDICINE CLINIC | Age: 57
End: 2022-05-02
Payer: COMMERCIAL

## 2022-05-02 ENCOUNTER — TELEPHONE (OUTPATIENT)
Dept: INTERNAL MEDICINE CLINIC | Age: 57
End: 2022-05-02

## 2022-05-02 VITALS — TEMPERATURE: 97 F

## 2022-05-02 DIAGNOSIS — L03.115 CELLULITIS OF RIGHT LOWER EXTREMITY: ICD-10-CM

## 2022-05-02 DIAGNOSIS — L97.919 VENOUS STASIS ULCER OF RIGHT LOWER EXTREMITY (HCC): Primary | ICD-10-CM

## 2022-05-02 DIAGNOSIS — I73.9 PERIPHERAL VASCULAR DISEASE (HCC): ICD-10-CM

## 2022-05-02 DIAGNOSIS — I83.009 VENOUS ULCER (HCC): ICD-10-CM

## 2022-05-02 DIAGNOSIS — L97.909 VENOUS ULCER (HCC): ICD-10-CM

## 2022-05-02 DIAGNOSIS — I83.019 VENOUS STASIS ULCER OF RIGHT LOWER EXTREMITY (HCC): Primary | ICD-10-CM

## 2022-05-02 PROCEDURE — 99213 OFFICE O/P EST LOW 20 MIN: CPT

## 2022-05-02 RX ORDER — CIPROFLOXACIN 500 MG/1
500 TABLET, FILM COATED ORAL 2 TIMES DAILY
Qty: 20 TABLET | Refills: 0 | Status: SHIPPED | OUTPATIENT
Start: 2022-05-02 | End: 2022-05-09

## 2022-05-02 RX ORDER — CLINDAMYCIN HYDROCHLORIDE 300 MG/1
300 CAPSULE ORAL 3 TIMES DAILY
Qty: 30 CAPSULE | Refills: 0 | Status: SHIPPED | OUTPATIENT
Start: 2022-05-02 | End: 2022-05-09

## 2022-05-03 ENCOUNTER — HOSPITAL ENCOUNTER (OUTPATIENT)
Age: 57
Discharge: HOME | End: 2022-05-03
Payer: COMMERCIAL

## 2022-05-03 ENCOUNTER — COMMUNITY OUTREACH (OUTPATIENT)
Dept: OTHER | Age: 57
End: 2022-05-03

## 2022-05-03 ENCOUNTER — HOSPITAL ENCOUNTER (OUTPATIENT)
Dept: GENERAL RADIOLOGY | Age: 57
Discharge: HOME | End: 2022-05-03
Payer: COMMERCIAL

## 2022-05-03 DIAGNOSIS — I83.009 VENOUS ULCER (HCC): ICD-10-CM

## 2022-05-03 DIAGNOSIS — L97.919 VENOUS STASIS ULCER OF RIGHT LOWER EXTREMITY (HCC): ICD-10-CM

## 2022-05-03 DIAGNOSIS — I83.019 VENOUS STASIS ULCER OF RIGHT LOWER EXTREMITY (HCC): ICD-10-CM

## 2022-05-03 DIAGNOSIS — L97.909 VENOUS ULCER (HCC): ICD-10-CM

## 2022-05-03 DIAGNOSIS — I82.5Z1 CHRONIC VENOUS EMBOLISM AND THROMBOSIS OF DEEP VESSELS OF DISTAL END OF RIGHT LOWER EXTREMITY (HCC): ICD-10-CM

## 2022-05-03 DIAGNOSIS — L03.115 CELLULITIS OF RIGHT LOWER EXTREMITY: ICD-10-CM

## 2022-05-03 DIAGNOSIS — I87.2 STASIS DERMATITIS OF BOTH LEGS: ICD-10-CM

## 2022-05-03 DIAGNOSIS — Z13.220 SCREENING FOR LIPID DISORDERS: ICD-10-CM

## 2022-05-03 LAB
A/G RATIO: 1.1 (ref 1.1–2.2)
ALBUMIN SERPL-MCNC: 3.7 G/DL (ref 3.4–5)
ALP BLD-CCNC: 75 U/L (ref 40–129)
ALT SERPL-CCNC: 9 U/L (ref 10–40)
ANION GAP SERPL CALCULATED.3IONS-SCNC: 14 MMOL/L (ref 3–16)
AST SERPL-CCNC: 11 U/L (ref 15–37)
BASOPHILS ABSOLUTE: 0 K/UL (ref 0–0.2)
BASOPHILS RELATIVE PERCENT: 0.1 %
BILIRUB SERPL-MCNC: 0.5 MG/DL (ref 0–1)
BUN BLDV-MCNC: 12 MG/DL (ref 7–20)
C-REACTIVE PROTEIN: 66.6 MG/L (ref 0–5.1)
CALCIUM SERPL-MCNC: 8.4 MG/DL (ref 8.3–10.6)
CHLORIDE BLD-SCNC: 105 MMOL/L (ref 99–110)
CHOLESTEROL, TOTAL: 180 MG/DL (ref 0–199)
CO2: 23 MMOL/L (ref 21–32)
CREAT SERPL-MCNC: 1 MG/DL (ref 0.9–1.3)
EOSINOPHILS ABSOLUTE: 0.2 K/UL (ref 0–0.6)
EOSINOPHILS RELATIVE PERCENT: 3 %
GFR AFRICAN AMERICAN: >60
GFR NON-AFRICAN AMERICAN: >60
GLUCOSE BLD-MCNC: 100 MG/DL (ref 70–99)
HCT VFR BLD CALC: 33.3 % (ref 40.5–52.5)
HDLC SERPL-MCNC: 37 MG/DL (ref 40–60)
HEMOGLOBIN: 10.8 G/DL (ref 13.5–17.5)
LDL CHOLESTEROL CALCULATED: 126 MG/DL
LYMPHOCYTES ABSOLUTE: 1.3 K/UL (ref 1–5.1)
LYMPHOCYTES RELATIVE PERCENT: 20.6 %
MCH RBC QN AUTO: 25.3 PG (ref 26–34)
MCHC RBC AUTO-ENTMCNC: 32.5 G/DL (ref 31–36)
MCV RBC AUTO: 77.7 FL (ref 80–100)
MONOCYTES ABSOLUTE: 0.6 K/UL (ref 0–1.3)
MONOCYTES RELATIVE PERCENT: 8.5 %
NEUTROPHILS ABSOLUTE: 4.4 K/UL (ref 1.7–7.7)
NEUTROPHILS RELATIVE PERCENT: 67.8 %
PDW BLD-RTO: 17.2 % (ref 12.4–15.4)
PLATELET # BLD: 248 K/UL (ref 135–450)
PMV BLD AUTO: 6.5 FL (ref 5–10.5)
POTASSIUM SERPL-SCNC: 5 MMOL/L (ref 3.5–5.1)
RBC # BLD: 4.28 M/UL (ref 4.2–5.9)
SEDIMENTATION RATE, ERYTHROCYTE: 83 MM/HR (ref 0–20)
SODIUM BLD-SCNC: 142 MMOL/L (ref 136–145)
TOTAL PROTEIN: 7.1 G/DL (ref 6.4–8.2)
TRIGL SERPL-MCNC: 86 MG/DL (ref 0–150)
TSH SERPL DL<=0.05 MIU/L-ACNC: 2.45 UIU/ML (ref 0.27–4.2)
VLDLC SERPL CALC-MCNC: 17 MG/DL
WBC # BLD: 6.4 K/UL (ref 4–11)

## 2022-05-03 PROCEDURE — 73590 X-RAY EXAM OF LOWER LEG: CPT

## 2022-05-03 NOTE — PROGRESS NOTES
Department of Podiatry  Resident Progress Note    Ruben Bustamante  Allergies: Patient has no known allergies. SUBJECTIVE  The patient is a 62 y.o. male who presents with longstanding venous stasis ulceration to the right lower extremity. Patient states that he used to follow with wound care Dr. Yamile Jackson, but on is unable to follow-up because of the expensive co-pays. He states while he was going to wound care the wounds to his right lower extremity were almost healed. He states that he changes his own dressings twice a week the way the wound care physicians did in the past.  He states that he has lymphedema pumps but has not been using them as recommended. He states that he went to his family doctor last week because of the wounds and was given clindamycin at that visit. He states that he has been dealing with these wounds for quite a while. He states that he is on his feet daily and his job it is hard for him to take care of the wounds as needed. He denies any pain to the wounds currently but states that sometimes they get painful whenever he is on his feet for long periods of time. He denies any other pedal complaints today. He denies any nausea, vomiting, fever, chills, shortness of breath, or chest pain at this time. Past Medical History:        Diagnosis Date    DVT (deep venous thrombosis) (HCC)     Hx of blood clots     Pain and swelling of right lower leg        ROS: A 10 point review of systems was conducted, significant findings as noted in HPI. All other systems negative. OBJECTIVE  Patient comes to clinic unaccompanied and unassisted in normal shoe gear. Patient is in no acute distress. Patient is alert and oriented x3    VASCULAR: DP and PT pulses are non-palpable 0/4 b/l. DP and PT pulses to bilateral lower extremity are monophasic upon hand-held Doppler examination. CFT is brisk to the digits of the foot b/l.  Skin temperature is warm to cool from proximal to distal with no focal calor noted. +3 pitting edema noted to bilateral lower extremities. no pain with calf compression b/l. NEUROLOGIC: Gross and epicritic sensation is intact b/l. Protective sensation is appreciated at all pedal sites b/l. DERMATOLOGIC:   Right lower extremity:                  Various irregular shaped full-thickness ulcerations noted to the anterior, medial, lateral, and posterior aspect of the right lower extremity at the level of the tibia and fibula with erythema noted periwound. Serous and greenish discharge noted from the wounds. The wounds do not tunnel or track. The wounds do not probe to bone. Malodor noted. Left lower extremity is a closed soft tissue envelope. MUSCULOSKELETAL: Muscle strength is 5/5 for all pedal groups tested. No pain with palpation of the foot or ankle b/l. Ankle joint ROM is decreased in dorsiflexion with the knee extended. No obvious biomechanical abnormalities. ASSESSMENT  -Venous stasis ulcerations with superimposed cellulitis, right lower extremity  -Peripheral vascular disease  -Lymphedema  -History of DVTs    PLAN  - Evaluation and management x 15 minutes and greater than 50% of the time spent explaining the etiology and treatment with the patient.   -Patient instructed to go to the emergency department for further work-up and evaluation of right lower extremity infection and IV antibiotics. Patient went [address] and states that he does not want to go to the emergency room. Patient states that co-pay will be too high.  -Since patient did not want to go to the emergency room patient was given prescriptions for clindamycin and ciprofloxacin for 10 days. -X-ray right tib-fib ordered  -Labs including CBC, ESR, CRP ordered.   Instructed patient to get labs before next visit  -Right lower extremity dressed with Adaptic, gauze, Kerlix, and Ace with gentle compression  -Left lower extremity dressed with Ace with gentle compression  -Instructed patient to change dressings daily as noted above  -Instructed patient that if he has worsening redness, pain to his right lower extremity and the development of constitutional symptoms including nausea vomiting, fever, or chills to present to the emergency department  -Arterial duplex ordered  -Patient return to clinic in 1 week for wound recheck    Assessment and plan was discussed with FRED Helm DPM   Podiatric Resident PGY1  5/3/2022, 6:07 AM

## 2022-05-03 NOTE — PROGRESS NOTES
[facility] He had been referred by Genet Dozier, Practice Manager for [address] [facility]. Pt has health insurance, but has high deductibles and co-pays. Pt has multiple outstanding medical bills for  treatments at the [address] in 2020 and 2021. He has slowly been paying down his balance, but is reluctant to acquire more bills. As a result, Pt has neglected his wound care which has resulted in deterioration. Pt was calling to inquire about assistance. Based on his stated income, Pt is likely eligible for assistance with some existing bills, and for care moving forward. JOSE LUIS briefly discussed, with Pt, what is needed for income verification to apply for financial assistance. JOSE LUIS spoke to SHIRA LEUNG [facility], following call from Pt, to get confirmation on specifically what information will be needed. JOSE LUIS sent an E mail to Pt, indicating what is needed and asking Pt to call back to discuss further.

## 2022-05-04 ENCOUNTER — COMMUNITY OUTREACH (OUTPATIENT)
Dept: OTHER | Age: 57
End: 2022-05-04

## 2022-05-04 NOTE — PROGRESS NOTES
Pt returned JOSE LUIS's call. He had received the E mail JOSE LUIS sent yesterday. Pt is working on acquiring all of the income information required for processing of his financial assistance application. He will send the information via E mail to JOSE LUIS once he has everything. JOSE LUIS will then review the financial assistance application with Pt and submit the application to [employer], on Pt's behalf.

## 2022-05-09 ENCOUNTER — OFFICE VISIT (OUTPATIENT)
Dept: INTERNAL MEDICINE CLINIC | Age: 57
End: 2022-05-09
Payer: COMMERCIAL

## 2022-05-09 VITALS — TEMPERATURE: 97.2 F

## 2022-05-09 DIAGNOSIS — I73.9 PERIPHERAL VASCULAR DISEASE (HCC): ICD-10-CM

## 2022-05-09 DIAGNOSIS — I83.009 VENOUS ULCER (HCC): Primary | ICD-10-CM

## 2022-05-09 DIAGNOSIS — L97.919 VENOUS STASIS ULCER OF RIGHT LOWER EXTREMITY (HCC): ICD-10-CM

## 2022-05-09 DIAGNOSIS — I83.019 VENOUS STASIS ULCER OF RIGHT LOWER EXTREMITY (HCC): ICD-10-CM

## 2022-05-09 DIAGNOSIS — L03.115 CELLULITIS OF RIGHT LOWER EXTREMITY: ICD-10-CM

## 2022-05-09 DIAGNOSIS — L97.909 VENOUS ULCER (HCC): Primary | ICD-10-CM

## 2022-05-09 PROCEDURE — 99213 OFFICE O/P EST LOW 20 MIN: CPT

## 2022-05-09 RX ORDER — CLINDAMYCIN HYDROCHLORIDE 300 MG/1
300 CAPSULE ORAL 3 TIMES DAILY
Qty: 30 CAPSULE | Refills: 5 | Status: SHIPPED | OUTPATIENT
Start: 2022-05-09 | End: 2022-07-08

## 2022-05-09 RX ORDER — CIPROFLOXACIN 500 MG/1
500 TABLET, FILM COATED ORAL 2 TIMES DAILY
Qty: 20 TABLET | Refills: 5 | Status: SHIPPED | OUTPATIENT
Start: 2022-05-09 | End: 2022-07-08

## 2022-05-09 NOTE — PATIENT INSTRUCTIONS
Continue current dressing changes   Continue antibiotics . Refill given for more of the dame antibiotics so you do not run out.   Return in 1 week

## 2022-05-09 NOTE — PROGRESS NOTES
Department of Podiatry  Resident Progress Note    Sonia Pepper  Allergies: Patient has no known allergies. SUBJECTIVE  The patient is a 62 y.o. male who presents with longstanding venous stasis ulceration to the right lower extremity. Patient states that he picked up his antibiotics last week and has been taking them daily as instructed and states that he has about 3 days left worth of them. He also states that he went and got his x-rays and lab work done. He states that he change his dressing 3 times last week. He states that the wound and the redness are looking better since his last appointment at the clinic. He denies any pain to the wounds and states that the malodor has gone down from the wounds. He denies any other pedal complaints today. He denies any nausea, vomiting, fever, chills, shortness of breath, or chest pain at this time. Past Medical History:        Diagnosis Date    DVT (deep venous thrombosis) (HCC)     Hx of blood clots     Pain and swelling of right lower leg        ROS: A 10 point review of systems was conducted, significant findings as noted in HPI. All other systems negative. OBJECTIVE  Patient comes to clinic unaccompanied and unassisted in normal shoe gear. Patient is in no acute distress. Patient is alert and oriented x3    VASCULAR: DP and PT pulses are non-palpable 0/4 b/l. DP and PT pulses to bilateral lower extremity are monophasic upon hand-held Doppler examination. CFT is brisk to the digits of the foot b/l. Skin temperature is warm to cool from proximal to distal with no focal calor noted. +3 pitting edema noted to bilateral lower extremities. no pain with calf compression b/l. NEUROLOGIC: Gross and epicritic sensation is intact b/l. Protective sensation is appreciated at all pedal sites b/l.     DERMATOLOGIC:   Right lower extremity:                Various irregular shaped full-thickness ulcerations noted to the anterior, medial, lateral, and posterior aspect of the right lower extremity at the level of the tibia and fibula with erythema noted periwound. Erythema noted to be improving. The wounds do not tunnel or track. The wounds do not probe to bone. Malodor noted. Left lower extremity is a closed soft tissue envelope. MUSCULOSKELETAL: Muscle strength is 5/5 for all pedal groups tested. No pain with palpation of the foot or ankle b/l. Ankle joint ROM is decreased in dorsiflexion with the knee extended. No obvious biomechanical abnormalities. IMAGING:  Right TIb/Fib 5/3/2022  Narrative   EXAM: XR TIBIA FIBULA RIGHT (2 VIEWS)       INDICATION:  Leg swelling, venous stasis ulcer of right lower extremity       COMPARISON: None       VIEWS: 2 views of the right tibia-fibula        FINDINGS:       OSSEOUS STRUCTURES: No acute fracture or osseous destruction.       JOINT SPACES: Degenerative changes in hindfoot and midfoot partially imaged.       SOFT TISSUES: Diffuse soft tissue swelling of the leg. Varicose veins are present.       OTHER FINDINGS: Enthesopathic changes along the tibia and fibula.           Impression   1.  Diffuse soft tissue swelling. 2.  No radiographic evidence of osteomyelitis. ASSESSMENT  -Venous stasis ulcerations with superimposed cellulitis, right lower extremity  -Peripheral vascular disease  -Lymphedema  -History of DVTs    PLAN  - Evaluation and management x 15 minutes and greater than 50% of the time spent explaining the etiology and treatment with the patient.   -Imaging reviewed, impression noted above  -No leukocytosis (WBC 6.4)  -ESR 83 CRP 66.6  -Wound culture from 4/25/2022: Pseudomonas aeruginosa, Enterobacter cloacae, Enterococcus faecalis. -Right lower extremity dressed with Adaptic, gauze, Kerlix, and Ace with gentle compression  -Instructed patient to change dressings daily as noted above  -Rx given for clinda and Cipro for another 6 weeks.   -Instructed patient to get arterial duplex as he has nonpalpable pulses  -Instructed patient that if he has worsening redness, pain to his right lower extremity and the development of constitutional symptoms including nausea vomiting, fever, or chills to present to the emergency department  -Patient return to clinic in 1 week for wound recheck and possible referral to wound care clinic    Assessment and plan was discussed with FRED Gilmore DPM   Podiatric Resident PGY1  5/9/2022, 7:12 PM

## 2022-05-10 ENCOUNTER — TELEPHONE (OUTPATIENT)
Dept: FAMILY MEDICINE CLINIC | Age: 57
End: 2022-05-10

## 2022-05-11 ENCOUNTER — COMMUNITY OUTREACH (OUTPATIENT)
Dept: OTHER | Age: 57
End: 2022-05-11

## 2022-05-11 NOTE — PROGRESS NOTES
Pt Emailed all requested income verification documents to [employer] and Pt completed the [employer] application, by phone. SW Emailed the application and all income verification, to [employer], on [employer].

## 2022-05-16 ENCOUNTER — OFFICE VISIT (OUTPATIENT)
Dept: INTERNAL MEDICINE CLINIC | Age: 57
End: 2022-05-16
Payer: COMMERCIAL

## 2022-05-16 VITALS — TEMPERATURE: 97 F

## 2022-05-16 DIAGNOSIS — L97.909 VENOUS ULCER (HCC): Primary | ICD-10-CM

## 2022-05-16 DIAGNOSIS — I83.009 VENOUS ULCER (HCC): Primary | ICD-10-CM

## 2022-05-16 DIAGNOSIS — I73.9 PERIPHERAL VASCULAR DISEASE (HCC): ICD-10-CM

## 2022-05-16 PROCEDURE — 99213 OFFICE O/P EST LOW 20 MIN: CPT

## 2022-05-16 NOTE — PROGRESS NOTES
Department of Podiatry  Resident Progress Note    Cinda Rolon  Allergies: Patient has no known allergies. SUBJECTIVE  The patient is a 62 y.o. male who presents with longstanding venous stasis ulceration to the right lower extremity. He states that he changed his dressing 3 times last week. He states that the wound and the redness are looking better since his last appointment at the clinic. He states that he has been compliant with his dressing changes and taking his antibiotics daily. He states that he doesn't use his lymphedema pumps as much as he should, but states that he will try to use them more. He denies any pain to the wounds and states that the malodor has gone down from the wounds. He denies any other pedal complaints today. He denies any nausea, vomiting, fever, chills, shortness of breath, or chest pain at this time. Past Medical History:        Diagnosis Date    DVT (deep venous thrombosis) (HCC)     Hx of blood clots     Pain and swelling of right lower leg        ROS: A 10 point review of systems was conducted, significant findings as noted in HPI. All other systems negative. OBJECTIVE  Patient comes to clinic unaccompanied and unassisted in normal shoe gear. Patient is in no acute distress. Patient is alert and oriented x3    VASCULAR: DP and PT pulses are non-palpable 0/4 b/l. DP and PT pulses to bilateral lower extremity are monophasic upon hand-held Doppler examination. CFT is brisk to the digits of the foot b/l. Skin temperature is warm to cool from proximal to distal with no focal calor noted. +3 pitting edema noted to bilateral lower extremities. no pain with calf compression b/l. NEUROLOGIC: Gross and epicritic sensation is intact b/l. Protective sensation is appreciated at all pedal sites b/l.     DERMATOLOGIC:   Right lower extremity:                Various irregular shaped full-thickness ulcerations noted to the anterior, medial, lateral, and posterior aspect of the right lower extremity at the level of the tibia and fibula with no erythema noted. The wounds do not tunnel or track. The wounds do not probe to bone. No malodor, fluctuance, or crepitus noted. Left lower extremity is a closed soft tissue envelope. MUSCULOSKELETAL: Muscle strength is 5/5 for all pedal groups tested. No pain with palpation of the foot or ankle b/l. Ankle joint ROM is decreased in dorsiflexion with the knee extended. No obvious biomechanical abnormalities. IMAGING:  Right TIb/Fib 5/3/2022  Narrative   EXAM: XR TIBIA FIBULA RIGHT (2 VIEWS)       INDICATION:  Leg swelling, venous stasis ulcer of right lower extremity       COMPARISON: None       VIEWS: 2 views of the right tibia-fibula        FINDINGS:       OSSEOUS STRUCTURES: No acute fracture or osseous destruction.       JOINT SPACES: Degenerative changes in hindfoot and midfoot partially imaged.       SOFT TISSUES: Diffuse soft tissue swelling of the leg. Varicose veins are present.       OTHER FINDINGS: Enthesopathic changes along the tibia and fibula.           Impression   1.  Diffuse soft tissue swelling. 2.  No radiographic evidence of osteomyelitis. ASSESSMENT  -Venous stasis ulcerations with superimposed cellulitis, right lower extremity  -Peripheral vascular disease  -Lymphedema  -History of DVTs    PLAN  - Evaluation and management x 15 minutes and greater than 50% of the time spent explaining the etiology and treatment with the patient.   -Imaging reviewed, impression noted above  -No leukocytosis (WBC 6.4)  -ESR 83 CRP 66.6  -Wound culture from 4/25/2022: Pseudomonas aeruginosa, Enterobacter cloacae, Enterococcus faecalis. -Right lower extremity dressed with Adaptic, gauze, Kerlix, and Ace with gentle compression  -Instructed patient to change dressings daily as noted above  -Instructed patient to continue his antibiotics to completion.  Patient voiced understanding.   -Instructed patient to get arterial duplex as he has nonpalpable pulses. Patient voiced understanding but state's it's too expensive and will defer at this time  -Instructed patient to use lymphedema pumps twice a day.  Patient voiced understanding.   -Instructed patient that if he has worsening redness, pain to his right lower extremity and the development of constitutional symptoms including nausea vomiting, fever, or chills to present to the emergency department  -Patient return to clinic in 1 week for wound recheck and possible referral to wound care clinic    Assessment and plan was discussed with FRED Lee DPM   Podiatric Resident PGY1  5/16/2022, 10:30 AM

## 2022-05-18 ENCOUNTER — ANTI-COAG VISIT (OUTPATIENT)
Dept: PHARMACY | Age: 57
End: 2022-05-18
Payer: COMMERCIAL

## 2022-05-18 ENCOUNTER — COMMUNITY OUTREACH (OUTPATIENT)
Dept: OTHER | Age: 57
End: 2022-05-18

## 2022-05-18 DIAGNOSIS — I82.5Z9 CHRONIC VENOUS EMBOLISM AND THROMBOSIS OF DEEP VESSELS OF DISTAL LOWER EXTREMITY, UNSPECIFIED LATERALITY (HCC): Primary | ICD-10-CM

## 2022-05-18 LAB — INTERNATIONAL NORMALIZATION RATIO, POC: 4.1

## 2022-05-18 PROCEDURE — 99212 OFFICE O/P EST SF 10 MIN: CPT | Performed by: PHARMACIST

## 2022-05-18 PROCEDURE — 85610 PROTHROMBIN TIME: CPT | Performed by: PHARMACIST

## 2022-05-18 NOTE — PROGRESS NOTES
ANTICOAGULATION SERVICE    Janine Silvestre is a 62 y.o. male with PMHx significant for chronic DVT (last in Jan, 2019) who presents to clinic 5/18/2022 for anticoagulation monitoring and adjustment. Patient has been taking warfarin for 15+ years.      Anticoagulation Indication(s):  DVT    Referring Physician:  Dr. Amy Kruse  Goal INR Range:  2-3  Duration of Anticoagulation Therapy:  Indefinite  Time of day dose taken:  AM  Product patient has at home:  warfarin 5 mg (peach)    INR Summary                            Warfarin regimen (mg)  Date INR   A/P    Sun Mon Tue Wed Thu Fri Sat Mg/wk  5/18 4.1 Above goal, dec (abx)  7.5 5 7.5 5 7.5 5 7.5 45  4/6 2.8 At goal, continue  7.5 7.5 7.5 7.5 7.5 5 7.5 50  12/1 2.9 At goal, continue  7.5 7.5 7.5 7.5 7.5 5 7.5 50  11/10 2.4 At goal, continue   7.5 7.5 7.5 7.5 7.5 5 7.5 50  10/27 4.4 Above goal, hold+dec  7.5 7.5 7.5 7.5 0/7.5 5 7.5 50  8/25 2.9 At goal, continue  7.5 7.5 7.5 7.5 7.5 7.5 7.5 52.5  7/14 2.5 At goal, continue   7.5 7.5 7.5 7.5 7.5 7.5 7.5 52.5  6/14 2.9 At goal, continue  7.5 7.5 7.5 7.5 0/7.5 7.5 7.5 52.5  5/26 3.9 Above goal (ABX), hold x 1 7.5 7.5 7.5 7.5 0/7.5 7.5 7.5 52.5  3/31 2.5 At goal, no change  7.5 7.5 7.5 7.5 7.5 7.5 7.5 52.5  2/17 3.0 At goal, no change  7.5 7.5 7.5 7.5 7.5 7.5 7.5 52.5  1/6 2.8 At goal, no change  7.5 7.5 7.5 7.5 7.5 7.5 7.5 52.5  12/2 2.9 At goal, no change  7.5 7.5 7.5 7.5 7.5 7.5 7.5 52.5  11/4 3.1 Above goal, continue  7.5 7.5 7.5 7.5 7.5 7.5 7.5 52.5  10/7 2.7 At goal, no change  7.5 7.5 7.5 7.5 7.5 7.5 7.5 52.5  9/9 2.7 At goal, no change  7.5 7.5 7.5 7.5 7.5 7.5 7.5 52.5  8/17 2.8 At goal, no change  7.5 7.5 7.5 7.5 7.5 7.5 7.5 52.5  8/3 3.4 Above goal, decrease  7.5 7.5 7.5 7.5 7.5 7.5 7.5 52.5  6/8 2.9 At goal, no change  7.5 7.5 7.5 7.5 7.5 10 7.5 55  2/28 2.7 At goal, no change  7.5 7.5 7.5 7.5 7.5 10 7.5 55  1/3 2.6 At goal, no change  7.5 7.5 7.5 7.5 7.5 10 7.5 55  11/20 2.8 At goal, no change  7.5 7.5 7.5 7.5 7.5 10 7.5 55  10/23 2.6 At goal, no change  7.5 7.5 7.5 7.5 7.5 10 7.5 55  8/23 2.2 At goal, no change  7.5 7.5 7.5 7.5 7.5 10 7.5 55  7/26 2.5 At goal, no change  7.5 7.5 7.5 7.5 7.5 10 7.5 55  6/28 2.3 At goal, no change  7.5 7.5 7.5 7.5 7.5 10 7.5 55  5/31 2.6 At goal, no change  7.5 7.5 7.5 7.5 7.5 10 7.5 55  5/1 2.5 At goal, no change  7.5 7.5 7.5 7.5 7.5 10 7.5 55  4/10 2.0 At goal, no change  7.5 7.5 7.5 7.5 7.5 10 7.5 55  3/13 2.6 At goal, no change  7.5 7.5 7.5 7.5 7.5 10 7.5 55  2/27 2.7 At goal, no change  7.5 7.5 7.5 7.5 7.5 10 7.5 55  2/20 2.9 At goal, no change  7.5 7.5 7.5 7.5 7.5 10 7.5 55  2/13 2.1 At goal, no change  7.5 7.5 7.5 7.5 7.5 10 7.5 55    Last CBC:  Lab Results   Component Value Date    RBC 4.28 05/03/2022    HGB 10.8 (L) 05/03/2022    HCT 33.3 (L) 05/03/2022    MCV 77.7 (L) 05/03/2022    MCH 25.3 (L) 05/03/2022    MPV 6.5 05/03/2022    RDW 17.2 (H) 05/03/2022     05/03/2022     Patient History:  Recent hospitalizations/HC visits 5/9/22: podiatry continue cipro+clinda x6 more weeks  7/28/21 ID: patient will potentially need IV abx. Continue following with wound care    Recent medication changes 5/2/22-current: ciprofloxacin 500 mg BID + clindamycin 300 mg TID (anticipated stop 6/2022)    Medications taken regularly that may interact with warfarin or alter INR None reported   Warfarin dose taken as prescribed Yes - does not use pillbox (only takes warfarin)   Signs/symptoms of bleeding No h/o bleeding reported   Vitamin K intake Normally avoids high vitamin K foods: ~0-1 serving per week   Recent vomiting/diarrhea/fever, changes in weight or activity level None reported   Tobacco or alcohol use Patient reports smoking 0 PPD  Patient reports having 0 drinks per day   Upcoming surgeries or procedures None reported     Assessment/Plan:  Patient's INR was supratherapeutic today (4.1) likely due to patient being on ciprofloxacin.  He was started on cipro+clinda for leg wound, which he has been on for >2 weeks now. Per podiatry note on  plan is to continue antibiotics for another 6 weeks (until ). Patient denies any missed/extra warfarin doses, diet changes, illness, bleeding, etc since last visit. Patient was instructed to hold warfarin tonight then decrease dose to warfarin 7.5 mg daily except 5 mg on Monday, Wednesday, and Friday. Repeat INR in 2 weeks. Patient prefers to extend interval between appointments. Patient was reminded to maintain consistent vitamin K intake and call with any bleeding, medication changes, or fever/vomiting/diarrhea. Patient understands dosing directions and information discussed. Dosing schedule and follow up appointment given to patient. Progress note routed to referring physician's office. Patient acknowledges working in consult agreement with pharmacist as referred by his/her physician.      Next Clinic Appointment:      Sabas Gaines, PharmD, BCPS  [facility] Medication Management [address] [facility]: [phone]  [facility]: [phone]  2022 10:37 AM     For Pharmacy Admin Tracking Only     Intervention Detail: Adherence Monitorin and Dose Adjustment: 1, reason: Interaction   Total # of Interventions Recommended: 2   Total # of Interventions Accepted: 2   Time Spent (min): 20

## 2022-05-23 ENCOUNTER — OFFICE VISIT (OUTPATIENT)
Dept: INTERNAL MEDICINE CLINIC | Age: 57
End: 2022-05-23
Payer: COMMERCIAL

## 2022-05-23 VITALS — TEMPERATURE: 96 F

## 2022-05-23 DIAGNOSIS — I73.9 PERIPHERAL VASCULAR DISEASE (HCC): Primary | ICD-10-CM

## 2022-05-23 DIAGNOSIS — L97.919 VENOUS STASIS ULCER OF RIGHT LOWER EXTREMITY (HCC): ICD-10-CM

## 2022-05-23 DIAGNOSIS — I83.019 VENOUS STASIS ULCER OF RIGHT LOWER EXTREMITY (HCC): ICD-10-CM

## 2022-05-23 PROCEDURE — 99213 OFFICE O/P EST LOW 20 MIN: CPT | Performed by: STUDENT IN AN ORGANIZED HEALTH CARE EDUCATION/TRAINING PROGRAM

## 2022-05-24 NOTE — PROGRESS NOTES
Department of Podiatry  Resident Progress Note    Nicola Bird  Allergies: Patient has no known allergies. SUBJECTIVE  The patient is a 62 y.o. male who presents with longstanding venous stasis ulceration to the right lower extremity. Patient states that he changed his dressings 3 times last week and they look better to him with these change. Furthermore he has been taking his antibiotics regularly. Patient denies any pain to the wounds and states that the malodor has gone down from the wounds. Patient denies any other pedal complaints. Patient denies any nausea, vomiting, fever, chills, shortness breath. Past Medical History:        Diagnosis Date    DVT [facility]deep venous thrombosis) ([facility])     Hx of blood clots     Pain and swelling of right lower leg        ROS:     Review of Systems: Pertinent positive and negative findings as documented in the HPI, otherwise all other systems were reviewed and were negative. OBJECTIVE  Patient comes to clinic unaccompanied and unassisted in normal shoe gear. Patient is in no acute distress. Patient is alert and oriented x3    VASCULAR: DP and PT pulses are non-palpable 0/4 b/l. DP and PT pulses to bilateral lower extremity are monophasic upon hand-held Doppler examination. CFT is brisk to the digits of the foot b/l. Skin temperature is warm to cool from proximal to distal with no focal calor noted. +3 pitting edema noted to bilateral lower extremities. no pain with calf compression b/l. NEUROLOGIC: Gross and epicritic sensation is intact b/l. Protective sensation is appreciated at all pedal sites b/l. DERMATOLOGIC:   Various irregular shaped full-thickness ulcerations noted to the anterior, medial, lateral, and posterior aspect of the right lower extremity at the level of the tibia and fibula with no erythema noted. The wounds do not tunnel or track. The wounds do not probe to bone. No malodor, fluctuance, or crepitus noted.     Patient provided verbal consent for pictures obtained today:                Left lower extremity is a closed soft tissue envelope. MUSCULOSKELETAL: Muscle strength is 5/5 for all pedal groups tested. No pain with palpation of the foot or ankle b/l. Ankle joint ROM is decreased in dorsiflexion with the knee extended. No obvious biomechanical abnormalities. IMAGING:  Right TIb/Fib 5/3/2022  Narrative   EXAM: XR TIBIA FIBULA RIGHT (2 VIEWS)       INDICATION:  Leg swelling, venous stasis ulcer of right lower extremity       COMPARISON: None       VIEWS: 2 views of the right tibia-fibula        FINDINGS:       OSSEOUS STRUCTURES: No acute fracture or osseous destruction.       JOINT SPACES: Degenerative changes in hindfoot and midfoot partially imaged.       SOFT TISSUES: Diffuse soft tissue swelling of the leg. Varicose veins are present.       OTHER FINDINGS: Enthesopathic changes along the tibia and fibula.           Impression   1.  Diffuse soft tissue swelling. 2.  No radiographic evidence of osteomyelitis. ASSESSMENT  -Venous stasis ulcerations with superimposed cellulitis, right lower extremity  -Peripheral vascular disease  -Lymphedema  -History of DVTs    PLAN  - Evaluation and management x 15 minutes and greater than 50% of the time spent explaining the etiology and treatment with the patient.   -Imaging reviewed, impression noted above  -No leukocytosis (WBC 6.4)  -ESR 83 CRP 66.6  -Wound culture from 4/25/2022: Pseudomonas aeruginosa, Enterobacter cloacae, Enterococcus faecalis. -Right lower extremity dressed with Adaptic, gauze, Kerlix, and Ace with gentle compression  -Instructed patient to change dressings daily as noted above  -Patient was told to refill his antibiotics.  -Instructed patient to use lymphedema pumps twice a day.  Patient voiced understanding.   -Instructed patient that if he has worsening redness, pain to his right lower extremity and the development of constitutional symptoms including nausea vomiting, fever, or chills to present to the emergency department  -Patient return to clinic in 2 weeks for wound recheck and possible referral to wound care clinic    Assessment and plan was discussed with FRED Gomez DPM  Podiatric Resident, PGY-1  Pager #: [phone] or Perfect Serve

## 2022-05-26 ENCOUNTER — COMMUNITY OUTREACH (OUTPATIENT)
Dept: OTHER | Age: 57
End: 2022-05-26

## 2022-05-26 NOTE — PROGRESS NOTES
[facility] submitted additional requested Pt income information to [employer]. SW contacted Financial Assistance and was told they have received all documents necessary to process Pt's fin asst applications.

## 2022-06-01 ENCOUNTER — ANTI-COAG VISIT (OUTPATIENT)
Dept: PHARMACY | Age: 57
End: 2022-06-01
Payer: COMMERCIAL

## 2022-06-01 DIAGNOSIS — I82.5Z9 CHRONIC VENOUS EMBOLISM AND THROMBOSIS OF DEEP VESSELS OF DISTAL LOWER EXTREMITY, UNSPECIFIED LATERALITY (HCC): Primary | ICD-10-CM

## 2022-06-01 LAB — INTERNATIONAL NORMALIZATION RATIO, POC: 2.6

## 2022-06-01 PROCEDURE — 99211 OFF/OP EST MAY X REQ PHY/QHP: CPT | Performed by: PHARMACIST

## 2022-06-01 PROCEDURE — 85610 PROTHROMBIN TIME: CPT | Performed by: PHARMACIST

## 2022-06-01 NOTE — PROGRESS NOTES
ANTICOAGULATION SERVICE    Janine Silvestre is a 62 y.o. male with PMHx significant for chronic DVT (last in Jan, 2019) who presents to clinic 6/1/2022 for anticoagulation monitoring and adjustment. Patient has been taking warfarin for 15+ years.      Anticoagulation Indication(s):  DVT    Referring Physician:  Dr. Amy Kruse -  New referral sent to Dr. Fontanez Overall  Goal INR Range:  2-3  Duration of Anticoagulation Therapy:  Indefinite  Time of day dose taken:  AM  Product patient has at home:  warfarin 5 mg (peach)    INR Summary                            Warfarin regimen (mg)  Date INR   A/P    Sun Mon Tue Wed Thu Fri Sat Mg/wk  6/1 2.6 At goal, continue   7.5 5 7.5 5 7.5 5 7.5 45  5/18 4.1 Above goal, dec (abx)  7.5 5 7.5 5 7.5 5 7.5 45  4/6 2.8 At goal, continue  7.5 7.5 7.5 7.5 7.5 5 7.5 50  12/1 2.9 At goal, continue  7.5 7.5 7.5 7.5 7.5 5 7.5 50  11/10 2.4 At goal, continue   7.5 7.5 7.5 7.5 7.5 5 7.5 50  10/27 4.4 Above goal, hold+dec  7.5 7.5 7.5 7.5 0/7.5 5 7.5 50  8/25 2.9 At goal, continue  7.5 7.5 7.5 7.5 7.5 7.5 7.5 52.5  7/14 2.5 At goal, continue   7.5 7.5 7.5 7.5 7.5 7.5 7.5 52.5  6/14 2.9 At goal, continue  7.5 7.5 7.5 7.5 0/7.5 7.5 7.5 52.5  5/26 3.9 Above goal (ABX), hold x 1 7.5 7.5 7.5 7.5 0/7.5 7.5 7.5 52.5  3/31 2.5 At goal, no change  7.5 7.5 7.5 7.5 7.5 7.5 7.5 52.5  2/17 3.0 At goal, no change  7.5 7.5 7.5 7.5 7.5 7.5 7.5 52.5  1/6 2.8 At goal, no change  7.5 7.5 7.5 7.5 7.5 7.5 7.5 52.5  12/2 2.9 At goal, no change  7.5 7.5 7.5 7.5 7.5 7.5 7.5 52.5  11/4 3.1 Above goal, continue  7.5 7.5 7.5 7.5 7.5 7.5 7.5 52.5  10/7 2.7 At goal, no change  7.5 7.5 7.5 7.5 7.5 7.5 7.5 52.5  9/9 2.7 At goal, no change  7.5 7.5 7.5 7.5 7.5 7.5 7.5 52.5  8/17 2.8 At goal, no change  7.5 7.5 7.5 7.5 7.5 7.5 7.5 52.5  8/3 3.4 Above goal, decrease  7.5 7.5 7.5 7.5 7.5 7.5 7.5 52.5  6/8 2.9 At goal, no change  7.5 7.5 7.5 7.5 7.5 10 7.5 55  2/28 2.7 At goal, no change  7.5 7.5 7.5 7.5 7.5 10 7.5 55  1/3 2.6 At goal, no change  7.5 7.5 7.5 7.5 7.5 10 7.5 55  11/20 2.8 At goal, no change  7.5 7.5 7.5 7.5 7.5 10 7.5 55  10/23 2.6 At goal, no change  7.5 7.5 7.5 7.5 7.5 10 7.5 55  8/23 2.2 At goal, no change  7.5 7.5 7.5 7.5 7.5 10 7.5 55  7/26 2.5 At goal, no change  7.5 7.5 7.5 7.5 7.5 10 7.5 55  6/28 2.3 At goal, no change  7.5 7.5 7.5 7.5 7.5 10 7.5 55  5/31 2.6 At goal, no change  7.5 7.5 7.5 7.5 7.5 10 7.5 55  5/1 2.5 At goal, no change  7.5 7.5 7.5 7.5 7.5 10 7.5 55  4/10 2.0 At goal, no change  7.5 7.5 7.5 7.5 7.5 10 7.5 55  3/13 2.6 At goal, no change  7.5 7.5 7.5 7.5 7.5 10 7.5 55  2/27 2.7 At goal, no change  7.5 7.5 7.5 7.5 7.5 10 7.5 55  2/20 2.9 At goal, no change  7.5 7.5 7.5 7.5 7.5 10 7.5 55  2/13 2.1 At goal, no change  7.5 7.5 7.5 7.5 7.5 10 7.5 55    Last CBC:  Lab Results   Component Value Date    RBC 4.28 05/03/2022    HGB 10.8 (L) 05/03/2022    HCT 33.3 (L) 05/03/2022    MCV 77.7 (L) 05/03/2022    MCH 25.3 (L) 05/03/2022    MPV 6.5 05/03/2022    RDW 17.2 (H) 05/03/2022     05/03/2022     Patient History:  Recent hospitalizations/HC visits 5/9/22: podiatry continue cipro+clinda x6 more weeks  7/28/21 ID: patient will potentially need IV abx. Continue following with wound care    Recent medication changes 5/2/22-current: ciprofloxacin 500 mg BID + clindamycin 300 mg TID (anticipated stop 6/20/22)    Medications taken regularly that may interact with warfarin or alter INR None reported   Warfarin dose taken as prescribed Yes - does not use pillbox (only takes warfarin)   Signs/symptoms of bleeding No h/o bleeding reported   Vitamin K intake Normally avoids high vitamin K foods: ~0-1 serving per week   Recent vomiting/diarrhea/fever, changes in weight or activity level None reported   Tobacco or alcohol use Patient reports smoking 0 PPD  Patient reports having 0 drinks per day   Upcoming surgeries or procedures None reported     Assessment/Plan:  Patient's INR was therapeutic today (2.6).  Dose was decreased last visit due to DDI with ciprofloxacin. He was started on cipro+clindamycin for leg wound. Per podiatry note on  plan is to continue antibiotics until . Patient denies any missed/extra warfarin doses, diet changes, illness, bleeding, etc since last visit. Patient was instructed to continue warfarin 7.5 mg daily except 5 mg on Monday, Wednesday, and Friday. Repeat INR in 3 weeks. Can likely resume previously stable dose of 50 mg/wk upon discontinuation of antibiotics. Patient prefers to extend interval between appointments. Patient was reminded to maintain consistent vitamin K intake and call with any bleeding, medication changes, or fever/vomiting/diarrhea. Patient understands dosing directions and information discussed. Dosing schedule and follow up appointment given to patient. Progress note routed to referring physician's office. Patient acknowledges working in consult agreement with pharmacist as referred by his/her physician.      Next Clinic Appointment:      Tena JulesD, [facility]  [facility]  [place]: [phone]  [place]: [phone]  2022 10:48 AM     For Pharmacy Admin Tracking Only     Intervention Detail: Adherence Monitorin   Total # of Interventions Recommended: 1   Total # of Interventions Accepted: 1   Time Spent (min): 15

## 2022-06-02 ENCOUNTER — TELEPHONE (OUTPATIENT)
Dept: PHARMACY | Age: 57
End: 2022-06-02

## 2022-06-02 DIAGNOSIS — I82.5Z9 CHRONIC VENOUS EMBOLISM AND THROMBOSIS OF DEEP VESSELS OF DISTAL LOWER EXTREMITY, UNSPECIFIED LATERALITY (HCC): Primary | ICD-10-CM

## 2022-06-02 NOTE — TELEPHONE ENCOUNTER
Dr. Do,    The patient's warfarin therapy is currently being managed by [address] [place] [address]. The referral on file is from Dr. Carlene Deutsch, who patient no longer follows with. Please sign pended referral for patient to continue care with the anticoagulation clinic.      Thanks,   Wilfredo Murphy, PharmD, [facility]  [facility] Medication Management [address]: [phone]  [facility]: [phone]  6/2/2022 2:32 PM

## 2022-06-08 ENCOUNTER — COMMUNITY OUTREACH (OUTPATIENT)
Dept: OTHER | Age: 57
End: 2022-06-08

## 2022-06-08 NOTE — PROGRESS NOTES
[facility] called Ruth to confirm that all information needed for Pt's application has been received. This was confirmed and Pt's claim will be processed within 30 days. SW will follow outcome.

## 2022-06-13 ENCOUNTER — OFFICE VISIT (OUTPATIENT)
Dept: INTERNAL MEDICINE CLINIC | Age: 57
End: 2022-06-13
Payer: COMMERCIAL

## 2022-06-13 VITALS — TEMPERATURE: 97 F

## 2022-06-13 DIAGNOSIS — L97.811 VENOUS STASIS ULCER OF OTHER PART OF RIGHT LOWER LEG LIMITED TO BREAKDOWN OF SKIN WITHOUT VARICOSE VEINS (HCC): Primary | ICD-10-CM

## 2022-06-13 DIAGNOSIS — I87.2 VENOUS STASIS ULCER OF OTHER PART OF RIGHT LOWER LEG LIMITED TO BREAKDOWN OF SKIN WITHOUT VARICOSE VEINS (HCC): Primary | ICD-10-CM

## 2022-06-13 DIAGNOSIS — I73.9 PERIPHERAL VASCULAR DISEASE (HCC): ICD-10-CM

## 2022-06-13 PROCEDURE — 99213 OFFICE O/P EST LOW 20 MIN: CPT | Performed by: STUDENT IN AN ORGANIZED HEALTH CARE EDUCATION/TRAINING PROGRAM

## 2022-06-13 NOTE — PATIENT INSTRUCTIONS
Call wound center and make appointment  As soon as possible. [phone]  Continue current dressing changes.

## 2022-06-13 NOTE — PROGRESS NOTES
Department of Podiatry  Resident Progress Note    Beverly Pavy  Allergies: Patient has no known allergies. SUBJECTIVE  The patient is a 62 y.o. male who presents with longstanding venous stasis ulceration to the right lower extremity. Patient states that he changed his dressings every other day for the past couple weeks. Patient additionally states that he has been using his lymphedema pumps and taking his antibiotics regularly. Patient denies any pain and feels as if the wounds have gotten smaller to him along with there being no underlying odor. Patient denies any other pedal complaints. Patient denies any nausea, vomiting, fever, chills, shortness breath. Past Medical History:        Diagnosis Date    DVT (deep venous thrombosis) ([facility])     Hx of blood clots     Pain and swelling of right lower leg        ROS:   Review of Systems: Pertinent positive and negative findings as documented in the HPI, otherwise all other systems were reviewed and were negative. OBJECTIVE  Patient comes to clinic unaccompanied and unassisted in normal shoe gear. Patient is in no acute distress. Patient is alert and oriented x3    VASCULAR: DP and PT pulses are non-palpable 0/4 b/l. DP and PT pulses to bilateral lower extremity are monophasic upon hand-held Doppler examination. CFT is brisk to the digits of the foot b/l. Skin temperature is warm to cool from proximal to distal with no focal calor noted. +3 pitting edema noted to bilateral lower extremities. No pain with calf compression b/l. NEUROLOGIC: Gross and epicritic sensation is intact b/l. Protective sensation is appreciated at all pedal sites b/l. DERMATOLOGIC:   Various irregular shaped full-thickness ulcerations noted to the anterior, medial, lateral, and posterior aspect of the right lower extremity at the level of the tibia and fibula with no erythema noted but a macerated periwound. The wounds do not tunnel or track.   The wounds do not probe to bone.  No malodor, fluctuance, or crepitus noted. Patient provided verbal consent for pictures obtained today:              Left lower extremity is a closed soft tissue envelope. MUSCULOSKELETAL: Muscle strength is 5/5 for all pedal groups tested. No pain with palpation of the foot or ankle b/l. Ankle joint ROM is decreased in dorsiflexion with the knee extended. No obvious biomechanical abnormalities. IMAGING:  Right TIb/Fib 5/3/2022  Narrative   EXAM: XR TIBIA FIBULA RIGHT (2 VIEWS)       INDICATION:  Leg swelling, venous stasis ulcer of right lower extremity       COMPARISON: None       VIEWS: 2 views of the right tibia-fibula        FINDINGS:       OSSEOUS STRUCTURES: No acute fracture or osseous destruction.       JOINT SPACES: Degenerative changes in hindfoot and midfoot partially imaged.       SOFT TISSUES: Diffuse soft tissue swelling of the leg. Varicose veins are present.       OTHER FINDINGS: Enthesopathic changes along the tibia and fibula.           Impression   1.  Diffuse soft tissue swelling. 2.  No radiographic evidence of osteomyelitis. ASSESSMENT  -Venous stasis ulcerations with superimposed cellulitis (resolved), right lower extremity  -Peripheral vascular disease  -Lymphedema  -History of DVTs    PLAN  - Evaluation and management x 15 minutes and greater than 50% of the time spent explaining the etiology and treatment with the patient.   -Imaging reviewed, impression noted above  -No leukocytosis (WBC 6.4)  -ESR 83 CRP 66.6  -Wound culture from 4/25/2022: Pseudomonas aeruginosa, Enterobacter cloacae, Enterococcus faecalis. -Right lower extremity dressed with Adaptic, gauze, Kerlix, and Ace with gentle compression  -Instructed patient to change dressings daily as noted above  -Instructed patient to use lymphedema pumps twice a day.  Patient voiced understanding.   -Instructed patient that if he has worsening redness, pain to his right lower extremity and the development of constitutional symptoms including nausea vomiting, fever, or chills to present to the emergency department  -Patient advised to follow up at [facility] wound care clinic for continued follow-up for his RLE wound  -RTC PRN    Assessment and plan was discussed with Dr. Claire Nugent, FRED Garcia DPM  Podiatric Resident, PGY-1  Pager #: [phone] or Perfect Serve

## 2022-06-22 ENCOUNTER — ANTI-COAG VISIT (OUTPATIENT)
Dept: PHARMACY | Age: 57
End: 2022-06-22
Payer: COMMERCIAL

## 2022-06-22 DIAGNOSIS — I82.5Z9 CHRONIC VENOUS EMBOLISM AND THROMBOSIS OF DEEP VESSELS OF DISTAL LOWER EXTREMITY, UNSPECIFIED LATERALITY (HCC): Primary | ICD-10-CM

## 2022-06-22 LAB — INTERNATIONAL NORMALIZATION RATIO, POC: 2.4

## 2022-06-22 PROCEDURE — 85610 PROTHROMBIN TIME: CPT

## 2022-06-22 PROCEDURE — 99212 OFFICE O/P EST SF 10 MIN: CPT

## 2022-06-22 NOTE — PROGRESS NOTES
change  7.5 7.5 7.5 7.5 7.5 10 7.5 55  1/3 2.6 At goal, no change  7.5 7.5 7.5 7.5 7.5 10 7.5 55  11/20 2.8 At goal, no change  7.5 7.5 7.5 7.5 7.5 10 7.5 55  10/23 2.6 At goal, no change  7.5 7.5 7.5 7.5 7.5 10 7.5 55  8/23 2.2 At goal, no change  7.5 7.5 7.5 7.5 7.5 10 7.5 55  7/26 2.5 At goal, no change  7.5 7.5 7.5 7.5 7.5 10 7.5 55  6/28 2.3 At goal, no change  7.5 7.5 7.5 7.5 7.5 10 7.5 55  5/31 2.6 At goal, no change  7.5 7.5 7.5 7.5 7.5 10 7.5 55  5/1 2.5 At goal, no change  7.5 7.5 7.5 7.5 7.5 10 7.5 55  4/10 2.0 At goal, no change  7.5 7.5 7.5 7.5 7.5 10 7.5 55  3/13 2.6 At goal, no change  7.5 7.5 7.5 7.5 7.5 10 7.5 55  2/27 2.7 At goal, no change  7.5 7.5 7.5 7.5 7.5 10 7.5 55  2/20 2.9 At goal, no change  7.5 7.5 7.5 7.5 7.5 10 7.5 55  2/13 2.1 At goal, no change  7.5 7.5 7.5 7.5 7.5 10 7.5 55    Last CBC:  Lab Results   Component Value Date    RBC 4.28 05/03/2022    HGB 10.8 (L) 05/03/2022    HCT 33.3 (L) 05/03/2022    MCV 77.7 (L) 05/03/2022    MCH 25.3 (L) 05/03/2022    MPV 6.5 05/03/2022    RDW 17.2 (H) 05/03/2022     05/03/2022     Patient History:  Recent hospitalizations/HC visits 5/9/22: podiatry continue cipro+clinda x6 more weeks  7/28/21 ID: patient will potentially need IV abx.  Continue following with wound care    Recent medication changes 6/22/22: took last dose abx last night   5/2/22-current: ciprofloxacin 500 mg BID + clindamycin 300 mg TID (anticipated stop 6/20/22)    Medications taken regularly that may interact with warfarin or alter INR None reported   Warfarin dose taken as prescribed Yes - does not use pillbox (only takes warfarin)   Signs/symptoms of bleeding No h/o bleeding reported   Vitamin K intake Normally avoids high vitamin K foods: ~0-1 serving per week   Recent vomiting/diarrhea/fever, changes in weight or activity level None reported   Tobacco or alcohol use Patient reports smoking 0 PPD  Patient reports having 0 drinks per day   Upcoming surgeries or procedures None reported     Assessment/Plan:  Patient's INR was therapeutic today (2.4). Dose was decreased recently due to DDI with ciprofloxacin. He was started on cipro+clindamycin for leg wound. Per podiatry note on 5/9 plan is to continue antibiotics until 6/20. Patient states he took his last dose of antibiotics last night. Patient denies any missed/extra warfarin doses, diet changes, illness, bleeding, etc since last visit. Patient was instructed to go back to previously stable dose of warfarin 7.5 mg daily except 5 mg on Friday since he completed his abx. Repeat INR in 2 weeks. Patient prefers to extend interval between appointments. Patient was reminded to maintain consistent vitamin K intake and call with any bleeding, medication changes, or fever/vomiting/diarrhea. Patient understands dosing directions and information discussed. Dosing schedule and follow up appointment given to patient. Progress note routed to referring physician's office. Patient acknowledges working in consult agreement with pharmacist as referred by his/her physician. Next Clinic Appointment:  7/6    Please call [facility] at [phone] with any questions. Thanks!   Sonali Beckett, PharmD  PGY1 Pharmacy Resident  Medication Management Clinic  6/22/2022 8:59 AM    For Pharmacy Admin Tracking Only     Intervention Detail: Dose Adjustment: 1, reason: Therapy Optimization   Total # of Interventions Recommended: 1   Total # of Interventions Accepted: 1   Time Spent (min): 15

## 2022-07-06 ENCOUNTER — ANTI-COAG VISIT (OUTPATIENT)
Dept: PHARMACY | Age: 57
End: 2022-07-06
Payer: MEDICAID

## 2022-07-06 DIAGNOSIS — I82.5Z9 CHRONIC VENOUS EMBOLISM AND THROMBOSIS OF DEEP VESSELS OF DISTAL LOWER EXTREMITY, UNSPECIFIED LATERALITY (HCC): Primary | ICD-10-CM

## 2022-07-06 LAB — INTERNATIONAL NORMALIZATION RATIO, POC: 3.1

## 2022-07-06 PROCEDURE — 99212 OFFICE O/P EST SF 10 MIN: CPT | Performed by: PHARMACIST

## 2022-07-06 PROCEDURE — 85610 PROTHROMBIN TIME: CPT | Performed by: PHARMACIST

## 2022-07-06 NOTE — PROGRESS NOTES
ANTICOAGULATION SERVICE    Gigi Oswald is a 62 y.o. 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 Patient has been taking warfarin for 15+ years.      Anticoagulation Indication(s):  DVT    Referring Physician:  Dr. Coon Covert  Goal INR Range:  2-3  Duration of Anticoagulation Therapy:  Indefinite  Time of day dose taken:  AM  Product patient has at home:  warfarin 5 mg (peach)    INR Summary                            Warfarin regimen (mg)  Date INR   A/P    Sun Mon Tue Wed Thu Fri Sat Mg/wk  7/6 3.1 Above goal, decrease  7.5 5 7.5 7.5 7.5 5 7.5 47.5  6/22 2.4 At goal, resume prv dose 7.5 7.5 7.5 7.5 7.5 5 7.5 50  6/1 2.6 At goal, continue   7.5 5 7.5 5 7.5 5 7.5 45  5/18 4.1 Above goal, dec (abx)  7.5 5 7.5 5 7.5 5 7.5 45  4/6 2.8 At goal, continue  7.5 7.5 7.5 7.5 7.5 5 7.5 50  12/1 2.9 At goal, continue  7.5 7.5 7.5 7.5 7.5 5 7.5 50  11/10 2.4 At goal, continue   7.5 7.5 7.5 7.5 7.5 5 7.5 50  10/27 4.4 Above goal, hold+dec  7.5 7.5 7.5 7.5 0/7.5 5 7.5 50  8/25 2.9 At goal, continue  7.5 7.5 7.5 7.5 7.5 7.5 7.5 52.5  7/14 2.5 At goal, continue   7.5 7.5 7.5 7.5 7.5 7.5 7.5 52.5  6/14 2.9 At goal, continue  7.5 7.5 7.5 7.5 0/7.5 7.5 7.5 52.5  5/26 3.9 Above goal (ABX), hold x 1 7.5 7.5 7.5 7.5 0/7.5 7.5 7.5 52.5  3/31 2.5 At goal, no change  7.5 7.5 7.5 7.5 7.5 7.5 7.5 52.5  2/17 3.0 At goal, no change  7.5 7.5 7.5 7.5 7.5 7.5 7.5 52.5  1/6 2.8 At goal, no change  7.5 7.5 7.5 7.5 7.5 7.5 7.5 52.5  12/2 2.9 At goal, no change  7.5 7.5 7.5 7.5 7.5 7.5 7.5 52.5  11/4 3.1 Above goal, continue  7.5 7.5 7.5 7.5 7.5 7.5 7.5 52.5  10/7 2.7 At goal, no change  7.5 7.5 7.5 7.5 7.5 7.5 7.5 52.5  9/9 2.7 At goal, no change  7.5 7.5 7.5 7.5 7.5 7.5 7.5 52.5  8/17 2.8 At goal, no change  7.5 7.5 7.5 7.5 7.5 7.5 7.5 52.5  8/3 3.4 Above goal, decrease  7.5 7.5 7.5 7.5 7.5 7.5 7.5 52.5  6/8 2.9 At goal, no change  7.5 7.5 7.5 7.5 7.5 10 7.5 55  2/28 2.7 At goal, no change  7.5 7.5 7.5 7.5 7.5 10 7.5 55  1/3 2.6 At goal, no change  7.5 7.5 7.5 7.5 7.5 10 7.5 55  11/20 2.8 At goal, no change  7.5 7.5 7.5 7.5 7.5 10 7.5 55  10/23 2.6 At goal, no change  7.5 7.5 7.5 7.5 7.5 10 7.5 55  8/23 2.2 At goal, no change  7.5 7.5 7.5 7.5 7.5 10 7.5 55  7/26 2.5 At goal, no change  7.5 7.5 7.5 7.5 7.5 10 7.5 55  6/28 2.3 At goal, no change  7.5 7.5 7.5 7.5 7.5 10 7.5 55  5/31 2.6 At goal, no change  7.5 7.5 7.5 7.5 7.5 10 7.5 55  5/1 2.5 At goal, no change  7.5 7.5 7.5 7.5 7.5 10 7.5 55  4/10 2.0 At goal, no change  7.5 7.5 7.5 7.5 7.5 10 7.5 55  3/13 2.6 At goal, no change  7.5 7.5 7.5 7.5 7.5 10 7.5 55  2/27 2.7 At goal, no change  7.5 7.5 7.5 7.5 7.5 10 7.5 55  2/20 2.9 At goal, no change  7.5 7.5 7.5 7.5 7.5 10 7.5 55  2/13 2.1 At goal, no change  7.5 7.5 7.5 7.5 7.5 10 7.5 55    Last CBC:  Lab Results   Component Value Date    RBC 4.28 05/03/2022    HGB 10.8 (L) 05/03/2022    HCT 33.3 (L) 05/03/2022    MCV 77.7 (L) 05/03/2022    MCH 25.3 (L) 05/03/2022    MPV 6.5 05/03/2022    RDW 17.2 (H) 05/03/2022     05/03/2022     Patient History:  Recent hospitalizations/HC visits 5/9/22: podiatry continue cipro+clinda x6 more weeks  7/28/21 ID: patient will potentially need IV abx.  Continue following with wound care    Recent medication changes 6/22/22: took last dose abx last night   5/2/22-current: ciprofloxacin 500 mg BID + clindamycin 300 mg TID (anticipated stop 6/20/22)    Medications taken regularly that may interact with warfarin or alter INR None reported   Warfarin dose taken as prescribed Yes - does not use pillbox (only takes warfarin)   Signs/symptoms of bleeding No h/o bleeding reported   Vitamin K intake Normally avoids high vitamin K foods: ~0-1 serving per week   Recent vomiting/diarrhea/fever, changes in weight or activity level None reported   Tobacco or alcohol use Patient reports smoking 0 PPD  Patient reports having 0 drinks per day   Upcoming surgeries or procedures None reported     Assessment/Plan:  Patient's INR was slightly supratherapeutic today (3.1). Dose was decreased recently due to DDI with ciprofloxacin, however her has been off the medication for 2 weeks now. Patient denies any missed/extra warfarin doses, diet changes, illness, bleeding, etc since last visit. Patient was instructed to decrease warfarin to 7.5 mg daily except 5 mg on Monday and Friday (5% decrease). May eventually need to increase back to previously stable dose of 50 mg/wk. Repeat INR in 3 weeks. Patient prefers to extend interval between appointments. Patient was reminded to maintain consistent vitamin K intake and call with any bleeding, medication changes, or fever/vomiting/diarrhea. Patient understands dosing directions and information discussed. Dosing schedule and follow up appointment given to patient. Progress note routed to referring physician's office. Patient acknowledges working in consult agreement with pharmacist as referred by his/her physician. Next Clinic Appointment:  7/27    Please call [facility] Anticoagulation Clinic at [phone] with any questions. Thanks!   Joe Turner, PharmD, BCPS  [facility] Medication Management Clinic  [place]: [phone]  [facility]: [phone]  7/6/2022 10:03 AM    For Pharmacy Admin Tracking Only     Intervention Detail: Dose Adjustment: 1, reason: Therapy De-escalation   Total # of Interventions Recommended: 1   Total # of Interventions Accepted: 1   Time Spent (min): 15 yes/"last month"

## 2022-07-12 ENCOUNTER — COMMUNITY OUTREACH (OUTPATIENT)
Dept: OTHER | Age: 57
End: 2022-07-12

## 2022-07-12 NOTE — PROGRESS NOTES
ANDI contacted [employer]. Pt's financial assistance applications have been processed and Pt is eligible for approximately 75% reduction for many [employer] services through 11/2022. Some of his outstanding bills were also reduced once applications were processed. JOSE LUIS contacted Pt to inform him of the outcome and offered to help Pt re-apply for financial assistance after current application approval period expires. Pt will contact JOSE LUIS if in need of additional assistance.

## 2022-07-14 RX ORDER — CIPROFLOXACIN 500 MG/1
TABLET, FILM COATED ORAL
Qty: 20 TABLET | OUTPATIENT
Start: 2022-07-14

## 2022-07-15 RX ORDER — CIPROFLOXACIN 500 MG/1
500 TABLET, FILM COATED ORAL 2 TIMES DAILY
Qty: 28 TABLET | Refills: 0 | Status: SHIPPED | OUTPATIENT
Start: 2022-07-15 | End: 2022-07-29

## 2022-07-15 NOTE — TELEPHONE ENCOUNTER
-Patient states that he ran out of ciprofloxacin  -Prescription for 2 weeks of ciprofloxacin written.  Advise patient to get filled at his local pharmacy  -Advise patient to follow up at wound care clinic for continued wound management as wounds are too extensive to been seen at [facility] outpatient podiatry clinic    Papo Lewis DPM  Podiatric Resident PGY-1  Pager [phone] or Perfect Serve

## 2022-07-25 ENCOUNTER — HOSPITAL ENCOUNTER (OUTPATIENT)
Dept: WOUND CARE | Age: 57
Discharge: HOME | End: 2022-07-25
Payer: MEDICAID

## 2022-07-25 VITALS
RESPIRATION RATE: 16 BRPM | TEMPERATURE: 97.5 F | DIASTOLIC BLOOD PRESSURE: 84 MMHG | SYSTOLIC BLOOD PRESSURE: 145 MMHG | HEART RATE: 71 BPM

## 2022-07-25 DIAGNOSIS — I89.0 LYMPHEDEMA: Primary | ICD-10-CM

## 2022-07-25 DIAGNOSIS — I87.311 IDIOPATHIC CHRONIC VENOUS HYPERTENSION OF RIGHT LOWER EXTREMITY WITH ULCER (HCC): ICD-10-CM

## 2022-07-25 DIAGNOSIS — L97.919 IDIOPATHIC CHRONIC VENOUS HYPERTENSION OF RIGHT LOWER EXTREMITY WITH ULCER (HCC): ICD-10-CM

## 2022-07-25 DIAGNOSIS — Z91.199 NONCOMPLIANCE: ICD-10-CM

## 2022-07-25 DIAGNOSIS — I89.0 CHRONIC ACQUIRED LYMPHEDEMA: ICD-10-CM

## 2022-07-25 PROCEDURE — 99202 OFFICE O/P NEW SF 15 MIN: CPT | Performed by: SPECIALIST

## 2022-07-25 PROCEDURE — 11045 DBRDMT SUBQ TISS EACH ADDL: CPT

## 2022-07-25 PROCEDURE — 29581 APPL MULTLAYER CMPRN SYS LEG: CPT

## 2022-07-25 PROCEDURE — 99214 OFFICE O/P EST MOD 30 MIN: CPT

## 2022-07-25 PROCEDURE — 11042 DBRDMT SUBQ TIS 1ST 20SQCM/<: CPT | Performed by: SPECIALIST

## 2022-07-25 PROCEDURE — 11042 DBRDMT SUBQ TIS 1ST 20SQCM/<: CPT

## 2022-07-25 PROCEDURE — 11045 DBRDMT SUBQ TISS EACH ADDL: CPT | Performed by: SPECIALIST

## 2022-07-25 RX ORDER — CLINDAMYCIN HYDROCHLORIDE 300 MG/1
1 CAPSULE ORAL 2 TIMES DAILY
COMMUNITY
Start: 2022-07-14 | End: 2022-07-28

## 2022-07-25 RX ORDER — LIDOCAINE HYDROCHLORIDE 40 MG/ML
2.5 SOLUTION TOPICAL ONCE
Status: DISCONTINUED | OUTPATIENT
Start: 2022-07-25 | End: 2022-07-26 | Stop reason: HOSPADM

## 2022-07-25 ASSESSMENT — PAIN DESCRIPTION - ONSET: ONSET: ON-GOING

## 2022-07-25 ASSESSMENT — PAIN DESCRIPTION - PAIN TYPE: TYPE: CHRONIC PAIN

## 2022-07-25 ASSESSMENT — PAIN DESCRIPTION - LOCATION: LOCATION: LEG

## 2022-07-25 ASSESSMENT — PAIN SCALES - GENERAL: PAINLEVEL_OUTOF10: 6

## 2022-07-25 ASSESSMENT — PAIN DESCRIPTION - ORIENTATION: ORIENTATION: RIGHT

## 2022-07-25 ASSESSMENT — PAIN DESCRIPTION - FREQUENCY: FREQUENCY: CONTINUOUS

## 2022-07-25 ASSESSMENT — PAIN DESCRIPTION - DESCRIPTORS: DESCRIPTORS: BURNING

## 2022-07-25 NOTE — PROGRESS NOTES
[address]  Progress Note and Procedure Note      Colbert Cushing  MEDICAL RECORD NUMBER:  [id number]  AGE: 62 y.o. GENDER: male  : 1965  EPISODE DATE:  2022      Subjective:     Chief Complaint   Patient presents with    Wound Check     Right Leg - returning pt         HISTORY of PRESENT ILLNESS HPI     Colbert Cushing is a 62 y.o. male who presents today for wound evaluation. History of Wound Context: Well-known to the wound care center having been treated for years for chronic venous insufficiency with open wounds and significant lymphedema. He has been lost to follow-up for almost 1 year. He returns today with multiple open wounds of the right lower extremity with massive edema. He states he has been trying to wrap his legs at home. He states he was recently placed on antibiotics from the podiatry clinic for his wounds. He has been maintained on his Coumadin for chronic DVT. He claims to intermittently been using his lymphedema pump  Wound Pain Timing/Severity: none  Quality of pain: N/A  Severity:  0 / 10   Modifying Factors: None  Associated Signs/Symptoms: edema and drainage    Wound Identification:  Wound Type: venous  Contributing Factors: edema, venous stasis, lymphedema, obesity, and anticoagulation therapy    Acute Wound: N/A        PAST MEDICAL HISTORY        Diagnosis Date    DVT (deep venous thrombosis) (HCC)     Hx of blood clots     Pain and swelling of right lower leg        PAST SURGICAL HISTORY    Past Surgical History:   Procedure Laterality Date    BALLOON ANGIOPLASTY, ARTERY Right 2017    at [facility] Right        FAMILY HISTORY    Family History   Problem Relation Age of Onset    Diabetes Mother     Heart Attack Father        SOCIAL HISTORY    Social History     Tobacco Use    Smoking status: Never    Smokeless tobacco: Never   Vaping Use    Vaping Use: Never used   Substance Use Topics    Alcohol use: No    Drug use:  No ALLERGIES    No Known Allergies    MEDICATIONS    Current Outpatient Medications on File Prior to Encounter   Medication Sig Dispense Refill    clindamycin (CLEOCIN) 300 MG capsule Take 1 capsule by mouth in the morning and 1 capsule before bedtime. ciprofloxacin (CIPRO) 500 MG tablet Take 1 tablet by mouth in the morning and 1 tablet before bedtime. Do all this for 14 days. (Patient not taking: Reported on 7/25/2022) 28 tablet 0    triamcinolone (KENALOG) 0.1 % ointment Apply topically 2 times daily 453.6 g 1    warfarin (COUMADIN) 5 MG tablet TAKE ONE AND ONE-HALF TABLETS BY MOUTH DAILY EXCEPT TAKE 2 TABLETS DAILY ON FRIDAY 180 tablet 1    Compression Stockings MISC by Does not apply route Strength 30-40mmHg  Style: Other pull up compression stockings  Extremity: bilateral  Donning aid recommended: No 1 each 0     No current facility-administered medications on file prior to encounter. REVIEW OF SYSTEMS    A comprehensive review of systems was negative except for: Cardiovascular: positive for lower extremity edema      Last C1T if applicable: No results found for: LABA1C    Objective:      BP (!) 145/84   Pulse 71   Temp 97.5 °F (36.4 °C) (Temporal)   Resp 16     Wt Readings from Last 3 Encounters:   04/25/22 251 lb 6.4 oz (114 kg)   04/18/22 251 lb 6.4 oz (114 kg)   10/27/21 244 lb (110.7 kg)       PHYSICAL EXAM    General Appearance: alert and oriented to person, place and time and obese  Head: normocephalic and atraumatic  Pulmonary/Chest: clear to auscultation bilaterally- no wheezes, rales or rhonchi, normal air movement, no respiratory distress  Cardiovascular: normal rate, normal S1 and S2, no gallops, and no carotid bruits  Abdomen: soft, non-tender, non-distended, normal bowel sounds, no masses or organomegaly with abdominal wall varicosities  Extremities: no cyanosis, no clubbing, 3+ nonpitting edema-  right lower extremity dermal fibrosis, hyperpigmentation.   Full-thickness fibrous wounds medial lateral and posterior knee containing fibrin, slough, granulation tissue. Periwound dry  Biphasic Doppler signals heard posterior tibial and dorsalis pedis pulse              Assessment:     No diagnosis found. Procedure Note  Indications:  Based on my examination of this patient's wound(s) today, sharp excision is required to promote healing and evaluate the extent healing. Performed by: Teofilo Garcia MD    Consent obtained: Yes    Time out taken:  Yes    Pain Control: Anesthetic  Anesthetic: 4% Lidocaine Liquid Topical       Debridement:Excisional Debridement    Using curette the wound(s) was/were sharply debrided down through and including the removal of epidermis, dermis, and subcutaneous tissue. Devitalized Tissue Debrided:  fibrin, biofilm, and slough    Pre Debridement Measurements:  Are located in the Wound Documentation Flow Sheet    Wound #: 1, 2, and 3     Post  Debridement Measurements:  Wound 07/25/22 Leg Right; Lower;Medial #1 (Active)   Wound Image   07/25/22 1040   Wound Etiology Venous 07/25/22 1040   Wound Cleansed Cleansed with saline 07/25/22 1040   Dressing/Treatment Alginate with Ag; Other (comment) 07/25/22 1139   Wound Length (cm) 4.3 cm 07/25/22 1040   Wound Width (cm) 5.5 cm 07/25/22 1040   Wound Depth (cm) 0.1 cm 07/25/22 1040   Wound Surface Area (cm^2) 23.65 cm^2 07/25/22 1040   Wound Volume (cm^3) 2.365 cm^3 07/25/22 1040   Post-Procedure Length (cm) 4.4 cm 07/25/22 1110   Post-Procedure Width (cm) 5.6 cm 07/25/22 1110   Post-Procedure Depth (cm) 0.3 cm 07/25/22 1110   Post-Procedure Surface Area (cm^2) 24.64 cm^2 07/25/22 1110   Post-Procedure Volume (cm^3) 7.392 cm^3 07/25/22 1110   Distance Tunneling (cm) 0 cm 07/25/22 1040   Undermining Maxium Distance (cm) 0 07/25/22 1040   Wound Assessment Slough;Pink/red;Granulation tissue 07/25/22 1040   Drainage Amount Moderate 07/25/22 1040   Drainage Description Yellow 07/25/22 1040   Odor Mild 07/25/22 1040 Kiah-wound Assessment Dry/flaky 07/25/22 1040   Margins Defined edges 07/25/22 1040   Wound Thickness Description not for Pressure Injury Full thickness 07/25/22 1040   Number of days: 0       Wound 07/25/22 Leg Right; Anterior; Lateral #2 (clustered linearly not proximally) (Active)   Wound Image   07/25/22 1040   Wound Etiology Venous 07/25/22 1040   Wound Cleansed Cleansed with saline 07/25/22 1040   Dressing/Treatment Alginate with Ag; Other (comment) 07/25/22 1139   Wound Length (cm) 4 cm 07/25/22 1040   Wound Width (cm) 11.4 cm 07/25/22 1040   Wound Depth (cm) 0.1 cm 07/25/22 1040   Wound Surface Area (cm^2) 45.6 cm^2 07/25/22 1040   Wound Volume (cm^3) 4.56 cm^3 07/25/22 1040   Post-Procedure Length (cm) 4.1 cm 07/25/22 1110   Post-Procedure Width (cm) 11.5 cm 07/25/22 1110   Post-Procedure Depth (cm) 0.3 cm 07/25/22 1110   Post-Procedure Surface Area (cm^2) 47.15 cm^2 07/25/22 1110   Post-Procedure Volume (cm^3) 14.145 cm^3 07/25/22 1110   Distance Tunneling (cm) 0 cm 07/25/22 1040   Undermining Maxium Distance (cm) 0 07/25/22 1040   Wound Assessment Pink/red;Slough;Granulation tissue 07/25/22 1040   Drainage Amount Moderate 07/25/22 1040   Drainage Description Yellow 07/25/22 1040   Odor Mild 07/25/22 1040   Kiah-wound Assessment Dry/flaky 07/25/22 1040   Margins Defined edges 07/25/22 1040   Wound Thickness Description not for Pressure Injury Full thickness 07/25/22 1040   Number of days: 0       Wound 07/25/22 Leg Right;Posterior; Lower #3 (Active)   Wound Image   07/25/22 1040   Wound Etiology Venous 07/25/22 1040   Wound Cleansed Cleansed with saline 07/25/22 1040   Dressing/Treatment Alginate with Ag; Other (comment) 07/25/22 1139   Wound Length (cm) 5.5 cm 07/25/22 1040   Wound Width (cm) 8 cm 07/25/22 1040   Wound Depth (cm) 0.1 cm 07/25/22 1040   Wound Surface Area (cm^2) 44 cm^2 07/25/22 1040   Wound Volume (cm^3) 4.4 cm^3 07/25/22 1040   Post-Procedure Length (cm) 5.6 cm 07/25/22 1110 Post-Procedure Width (cm) 8.1 cm 07/25/22 1110   Post-Procedure Depth (cm) 0.3 cm 07/25/22 1110   Post-Procedure Surface Area (cm^2) 45.36 cm^2 07/25/22 1110   Post-Procedure Volume (cm^3) 13.608 cm^3 07/25/22 1110   Distance Tunneling (cm) 0 cm 07/25/22 1040   Undermining Maxium Distance (cm) 0 07/25/22 1040   Wound Assessment Pink/red;Slough;Granulation tissue 07/25/22 1040   Drainage Amount Small 07/25/22 1040   Drainage Description Yellow 07/25/22 1040   Odor Mild 07/25/22 1040   Kiah-wound Assessment Dry/flaky 07/25/22 1040   Margins Defined edges 07/25/22 1040   Wound Thickness Description not for Pressure Injury Full thickness 07/25/22 1040   Number of days: 0          Percent of Wound Debrided: 100%    Total Surface Area Debrided:  117 sq cm     Bleeding:  Minimal    Hemostasis Achieved:  by pressure    Procedural Pain:  0  / 10     Post Procedural Pain:  0 / 10     Response to treatment:  Well tolerated by patient. Plan:   Once again patient will be wrapped in a 4 layer Coban compression wrap. Was strongly urged to use his lymphedema pumps daily    Treatment Note please see attached Discharge Instructions    New Medication(s) at this visit:   New Prescriptions    No medications on file       Other orders at this visit: No orders of the defined types were placed in this encounter. Weight Management: Yes but patient was not interested in Weight Management referral at this time. Smoking Cessation: Counseling given: Not Answered        Discharge Instructions          [facility] Physician Orders and Discharge Instructions  [address] [place] [address]  Telephone: [phone]  NAME:  Kia De Leon  YOB: 1965  MEDICAL RECORD NUMBER:  [id number]  DATE: 7/25/22     Return Appointment:  Return Appointment: With Dr. Lucina Black  in  [address] [place] [address])  [x] Return Appointment for a Wound Assessment with the nurse on: Friday 07/29/22    Future Appointments   Date Time Provider Eloina Woodi   7/27/2022 10:00 AM 6 Christine Guido Patricked: none  Medically necessary services: [] Skilled Nursing [] PT (Eval & Treat) [] OT (Eval & Treat) [] Social Work [] Dietician  [] Other:    Wound care instructions: If you smoke we ask that you refrain from smoking. Smoking inhibits wounds from healing. When taking antibiotics take the entire prescription as ordered. Do not stop taking until medication is all gone unless otherwise instructed. Exercise as tolerated. Keep weight off wounds and reposition every 2 hours if applicable. Do not get wounds wet in the bath or shower unless otherwise instructed by your physician. If your wound is on your foot or leg, you may purchase a cast bag. Please ask at the pharmacy. Wash hands with soap and water prior to and after every dressing change. [x]Wash wounds with: Vashe wash - Apply enough Vashe to soak a piece of gauze and place on wound bed for 5-10 minutes. No need to rinse after soaking. [x]Kiah wound Topical Treatments: Do not apply lotions, creams, or ointments to the skin around the wound bed unless directed as followed:   Apply around the wound: Nothing         [x]Wound Location: right lower leg   Apply Primary Dressing to wound: Alginate with silver pad  Secondary Dressing: Extra absorbant pad   Avoid contact of tape with skin if possible. When to change Dressing: DO NOT CHANGE          [x] Multilayer Compression Wrap:  Type: 4 Layer Compression Wrap- right    Do not get leg(s) with wrap wet. If wraps become too tight call the center or completely remove the wrap. Elevate leg(s) above the level of the heart when sitting. Avoid prolonged standing in one place.    Applied in Clinic on 7/25/2022  The Goal of this therapy is to reduce edema and get into long term compression garments to control venous insufficiency, lymphedema and reduce occurrence of venous ulcers    [x] Edema Control:  Apply: Compression Stocking on Left - 30-40mmHG     Elevate leg(s) above the level of the heart for 30 minutes 4-5 times a day and/or when sitting. Avoid prolonged standing in one place. [x] Lymphedema Therapy:  Wear Lymphedema pumps twice a day at settings prescribed by your physician. Avoid prolonged standing in one place. Dietary:  Important dietary reminders:  1. Increase Protein intake (i.e. Lean meats, fish, eggs, legumes, and yogurt)  2. No added salt  3. If diabetic, follow a diabetic diet and check glucose prior to meals or as instructed by your physician. Dietary Supplements(Take twice a day unless instructed otherwise):  [] Luc[employer]  [] 30ml ProStat [] Angie Maliha [] Ensure Max/Premier [] Other:    Your nurse  is:  Courtney Henson     Electronically signed by Radha Ignacio RN on 7/25/2022 at [address] [place] [address] Information: Should you experience any significant changes in your wound(s) or have questions about your wound care, please contact the [address] [place] [address] at [phone]. We are open from 8:00am - 4:30p Monday, Thursday and Friday; 11:00am - 5pm on Tuesday and CLOSED Wednesday. Please give us 24-48 business hours to return your call. Call your doctor now or seek immediate medical care if:    You have symptoms of infection, such as: Increased pain, swelling, warmth, or redness. Red streaks leading from the area. Pus draining from the area. A fever.          [] Patient unable to sign Discharge Instructions given to ECF/Transportation/POA         Electronically signed by Delroy Ocasio MD on 7/25/2022 at 1:32 PM

## 2022-07-25 NOTE — DISCHARGE INSTRUCTIONS
215 [facility] Physician Orders and Discharge Instructions  [address] [place] [id number] [address] [facility]. [address]  Telephone: [phone]  NAME:  Jay Mancini  YOB: 1965  MEDICAL RECORD NUMBER:  [id number]  DATE: 7/25/22     Return Appointment:  Return Appointment: With Dr. Sunny Gorman  in  [facility])  [x] Return Appointment for a Wound Assessment with the nurse on:  Friday 07/29/22    Future Appointments   Date Time Provider Eloina Zamarripa   7/27/2022 10:00 AM 6 Rujesus Guido Eloued: none  Medically necessary services: [] Skilled Nursing [] PT (Eval & Treat) [] OT (Eval & Treat) [] Social Work [] Dietician  [] Other:    Wound care instructions: If you smoke we ask that you refrain from smoking. Smoking inhibits wounds from healing. When taking antibiotics take the entire prescription as ordered. Do not stop taking until medication is all gone unless otherwise instructed. Exercise as tolerated. Keep weight off wounds and reposition every 2 hours if applicable. Do not get wounds wet in the bath or shower unless otherwise instructed by your physician. If your wound is on your foot or leg, you may purchase a cast bag. Please ask at the pharmacy. Wash hands with soap and water prior to and after every dressing change. [x]Wash wounds with: Vashe wash - Apply enough Vashe to soak a piece of gauze and place on wound bed for 5-10 minutes. No need to rinse after soaking. [x]Kiah wound Topical Treatments: Do not apply lotions, creams, or ointments to the skin around the wound bed unless directed as followed:   Apply around the wound: Nothing         [x]Wound Location: right lower leg   Apply Primary Dressing to wound: Alginate with silver pad  Secondary Dressing: Extra absorbant pad   Avoid contact of tape with skin if possible.   When to change Dressing: DO NOT CHANGE          [x] Multilayer Compression Wrap:  Type: 4 Layer Compression Wrap- right    Do not get leg(s) with wrap wet. If wraps become too tight call the center or completely remove the wrap. Elevate leg(s) above the level of the heart when sitting. Avoid prolonged standing in one place. Applied in Clinic on 7/25/2022  The Goal of this therapy is to reduce edema and get into long term compression garments to control venous insufficiency, lymphedema and reduce occurrence of venous ulcers    [x] Edema Control:  Apply: Compression Stocking on Left - 30-40mmHG     Elevate leg(s) above the level of the heart for 30 minutes 4-5 times a day and/or when sitting. Avoid prolonged standing in one place. [x] Lymphedema Therapy:  Wear Lymphedema pumps twice a day at settings prescribed by your physician. Avoid prolonged standing in one place. Dietary:  Important dietary reminders:  1. Increase Protein intake (i.e. Lean meats, fish, eggs, legumes, and yogurt)  2. No added salt  3. If diabetic, follow a diabetic diet and check glucose prior to meals or as instructed by your physician. Dietary Supplements(Take twice a day unless instructed otherwise):  [] Rudene Neth  [] 30ml ProStat [] Olevia Amaris [] Ensure Max/Premier [] Other:    Your nurse  is:  Wiley Uriostegui     Electronically signed by Robbin Vieira RN on 7/25/2022 at [address] [place] [address] Information: Should you experience any significant changes in your wound(s) or have questions about your wound care, please contact the [address] [place] [address] at [phone]. We are open from 8:00am - 4:30p Monday, Thursday and Friday; 11:00am - 5pm on Tuesday and CLOSED Wednesday. Please give us 24-48 business hours to return your call. Call your doctor now or seek immediate medical care if:    You have symptoms of infection, such as: Increased pain, swelling, warmth, or redness. Red streaks leading from the area. Pus draining from the area. A fever.          [] Patient unable to sign Discharge Instructions given to ECF/Transportation/POA

## 2022-07-25 NOTE — PLAN OF CARE
Multilayer Compression Wrap   (Not Unna) Below the Knee    NAME:  Rip Dominguez  YOB: 1965  MEDICAL RECORD NUMBER:  [id number]  DATE:  7/25/2022       Removed old Multilayer wrap if present and washed leg with mild soap/water. Applied moisturizing agent to dry skin as needed. Applied primary and secondary dressing as ordered    Applied multilayered dressing below the knee to Right lower leg(s)  (4 Layer Compression Wrap ) . Instructed patient/caregiver not to remove dressing and to keep it clean and dry. Instructed patient/caregiver on complications to report to provider, such as pain, numbness in toes, heavy drainage, and slippage of dressing. Instructed patient on purpose of compression dressing and on activity and exercise recommendations.    Applied per   Guidelines    Electronically signed by Flakito Hernandez RN on 7/25/2022 at 11:44 AM

## 2022-07-27 ENCOUNTER — ANTI-COAG VISIT (OUTPATIENT)
Dept: PHARMACY | Age: 57
End: 2022-07-27
Payer: MEDICAID

## 2022-07-27 DIAGNOSIS — I82.5Z1 CHRONIC VENOUS EMBOLISM AND THROMBOSIS OF DEEP VESSELS OF DISTAL END OF RIGHT LOWER EXTREMITY (HCC): Primary | ICD-10-CM

## 2022-07-27 PROBLEM — I89.0 LYMPHEDEMA: Status: ACTIVE | Noted: 2022-07-27

## 2022-07-27 LAB — INTERNATIONAL NORMALIZATION RATIO, POC: 3.1

## 2022-07-27 PROCEDURE — 85610 PROTHROMBIN TIME: CPT

## 2022-07-27 PROCEDURE — 99211 OFF/OP EST MAY X REQ PHY/QHP: CPT

## 2022-07-27 NOTE — PROGRESS NOTES
ANTICOAGULATION SERVICE    Radha Manning is a 62 y.o. male with PMHx significant for chronic DVT (last in Jan, 2019) who presents to clinic 7/27/2022 for anticoagulation monitoring and adjustment. Patient has been taking warfarin for 15+ years.      Anticoagulation Indication(s):  DVT    Referring Physician:  Dr. Doron Guo  Goal INR Range:  2-3  Duration of Anticoagulation Therapy:  Indefinite  Time of day dose taken:  AM  Product patient has at home:  warfarin 5 mg (peach)    INR Summary                            Warfarin regimen (mg)  Date INR   A/P    Sun Mon Tue Wed Thu Fri Sat Mg/wk  7/27 3.1 Above goal, continue    7.5 5 7.5 7.5 7.5 5 7.5 47.5  7/6 3.1 Above goal, decrease  7.5 5 7.5 7.5 7.5 5 7.5 47.5  6/22 2.4 At goal, resume prv dose 7.5 7.5 7.5 7.5 7.5 5 7.5 50  6/1 2.6 At goal, continue   7.5 5 7.5 5 7.5 5 7.5 45  5/18 4.1 Above goal, dec (abx)  7.5 5 7.5 5 7.5 5 7.5 45  4/6 2.8 At goal, continue  7.5 7.5 7.5 7.5 7.5 5 7.5 50  12/1 2.9 At goal, continue  7.5 7.5 7.5 7.5 7.5 5 7.5 50  11/10 2.4 At goal, continue   7.5 7.5 7.5 7.5 7.5 5 7.5 50  10/27 4.4 Above goal, hold+dec  7.5 7.5 7.5 7.5 0/7.5 5 7.5 50  8/25 2.9 At goal, continue  7.5 7.5 7.5 7.5 7.5 7.5 7.5 52.5  7/14 2.5 At goal, continue   7.5 7.5 7.5 7.5 7.5 7.5 7.5 52.5  6/14 2.9 At goal, continue  7.5 7.5 7.5 7.5 0/7.5 7.5 7.5 52.5  5/26 3.9 Above goal (ABX), hold x 1 7.5 7.5 7.5 7.5 0/7.5 7.5 7.5 52.5  3/31 2.5 At goal, no change  7.5 7.5 7.5 7.5 7.5 7.5 7.5 52.5  2/17 3.0 At goal, no change  7.5 7.5 7.5 7.5 7.5 7.5 7.5 52.5  1/6 2.8 At goal, no change  7.5 7.5 7.5 7.5 7.5 7.5 7.5 52.5  12/2 2.9 At goal, no change  7.5 7.5 7.5 7.5 7.5 7.5 7.5 52.5  11/4 3.1 Above goal, continue  7.5 7.5 7.5 7.5 7.5 7.5 7.5 52.5  10/7 2.7 At goal, no change  7.5 7.5 7.5 7.5 7.5 7.5 7.5 52.5  9/9 2.7 At goal, no change  7.5 7.5 7.5 7.5 7.5 7.5 7.5 52.5  8/17 2.8 At goal, no change  7.5 7.5 7.5 7.5 7.5 7.5 7.5 52.5  8/3 3.4 Above goal, decrease  7.5 7.5 7.5 7.5 7.5 7.5 7.5 52.5  6/8 2.9 At goal, no change  7.5 7.5 7.5 7.5 7.5 10 7.5 55  2/28 2.7 At goal, no change  7.5 7.5 7.5 7.5 7.5 10 7.5 55  1/3 2.6 At goal, no change  7.5 7.5 7.5 7.5 7.5 10 7.5 55  11/20 2.8 At goal, no change  7.5 7.5 7.5 7.5 7.5 10 7.5 55  10/23 2.6 At goal, no change  7.5 7.5 7.5 7.5 7.5 10 7.5 55  8/23 2.2 At goal, no change  7.5 7.5 7.5 7.5 7.5 10 7.5 55  7/26 2.5 At goal, no change  7.5 7.5 7.5 7.5 7.5 10 7.5 55  6/28 2.3 At goal, no change  7.5 7.5 7.5 7.5 7.5 10 7.5 55  5/31 2.6 At goal, no change  7.5 7.5 7.5 7.5 7.5 10 7.5 55  5/1 2.5 At goal, no change  7.5 7.5 7.5 7.5 7.5 10 7.5 55  4/10 2.0 At goal, no change  7.5 7.5 7.5 7.5 7.5 10 7.5 55  3/13 2.6 At goal, no change  7.5 7.5 7.5 7.5 7.5 10 7.5 55  2/27 2.7 At goal, no change  7.5 7.5 7.5 7.5 7.5 10 7.5 55  2/20 2.9 At goal, no change  7.5 7.5 7.5 7.5 7.5 10 7.5 55  2/13 2.1 At goal, no change  7.5 7.5 7.5 7.5 7.5 10 7.5 55    Last CBC:  Lab Results   Component Value Date    RBC 4.28 05/03/2022    HGB 10.8 (L) 05/03/2022    HCT 33.3 (L) 05/03/2022    MCV 77.7 (L) 05/03/2022    MCH 25.3 (L) 05/03/2022    MPV 6.5 05/03/2022    RDW 17.2 (H) 05/03/2022     05/03/2022     Patient History:  Recent hospitalizations/HC visits 5/9/22: podiatry continue cipro+clinda x6 more weeks  7/28/21 ID: patient will potentially need IV abx.  Continue following with wound care    Recent medication changes 6/22/22: took last dose abx last night   5/2/22-current: ciprofloxacin 500 mg BID + clindamycin 300 mg TID (anticipated stop 6/20/22)    Medications taken regularly that may interact with warfarin or alter INR None reported   Warfarin dose taken as prescribed Yes - does not use pillbox (only takes warfarin)   Signs/symptoms of bleeding No h/o bleeding reported   Vitamin K intake Normally avoids high vitamin K foods: ~0-1 serving per week   Recent vomiting/diarrhea/fever, changes in weight or activity level None reported Tobacco or alcohol use Patient reports smoking 0 PPD  Patient reports having 0 drinks per day   Upcoming surgeries or procedures None reported     Assessment/Plan:  Patient's INR was slightly supratherapeutic again today (3.1), but stable from last check and will continue to monitor. Dose was decreased recently due to DDI with ciprofloxacin, however he has been off the medication for 4 weeks now. Patient denies any missed/extra warfarin doses, diet changes, illness, bleeding, etc since last visit. Patient has recently started taking clindamycin but this medication should not affect the INR. Patient was instructed to continue warfarin to 7.5 mg daily except 5 mg on Monday and Friday. May eventually need to increase back to previously stable dose of 50 mg/wk. Repeat INR in 4 weeks. Patient prefers to extend interval between appointments. Patient was reminded to maintain consistent vitamin K intake and call with any bleeding, medication changes, or fever/vomiting/diarrhea. Patient understands dosing directions and information discussed. Dosing schedule and follow up appointment given to patient. Progress note routed to referring physician's office. Patient acknowledges working in consult agreement with pharmacist as referred by his/her physician. Next Clinic Appointment:  8/24    Please call [facility] at [phone] with any questions. Thanks!   Per Crouch, PharmD  PGY-1 Pharmacy Resident  [facility]  [id number]  7/27/2022   10:16 AM      For Pharmacy Admin Tracking Only    Total # of Interventions Recommended: 0  Total # of Interventions Accepted: 0  Time Spent (min): 15

## 2022-07-28 RX ORDER — BACITRACIN, NEOMYCIN, POLYMYXIN B 400; 3.5; 5 [USP'U]/G; MG/G; [USP'U]/G
OINTMENT TOPICAL ONCE
Status: CANCELLED | OUTPATIENT
Start: 2022-07-28 | End: 2022-07-28

## 2022-07-28 RX ORDER — LIDOCAINE 40 MG/G
CREAM TOPICAL ONCE
Status: CANCELLED | OUTPATIENT
Start: 2022-07-28 | End: 2022-07-28

## 2022-07-28 RX ORDER — LIDOCAINE 50 MG/G
OINTMENT TOPICAL ONCE
Status: CANCELLED | OUTPATIENT
Start: 2022-07-28 | End: 2022-07-28

## 2022-07-28 RX ORDER — BETAMETHASONE DIPROPIONATE 0.05 %
OINTMENT (GRAM) TOPICAL ONCE
Status: CANCELLED | OUTPATIENT
Start: 2022-07-28 | End: 2022-07-28

## 2022-07-28 RX ORDER — GENTAMICIN SULFATE 1 MG/G
OINTMENT TOPICAL ONCE
Status: CANCELLED | OUTPATIENT
Start: 2022-07-28 | End: 2022-07-28

## 2022-07-28 RX ORDER — CLOBETASOL PROPIONATE 0.5 MG/G
OINTMENT TOPICAL ONCE
Status: CANCELLED | OUTPATIENT
Start: 2022-07-28 | End: 2022-07-28

## 2022-07-28 RX ORDER — LIDOCAINE HYDROCHLORIDE 40 MG/ML
SOLUTION TOPICAL ONCE
Status: CANCELLED | OUTPATIENT
Start: 2022-07-28 | End: 2022-07-28

## 2022-07-28 RX ORDER — LIDOCAINE HYDROCHLORIDE 20 MG/ML
JELLY TOPICAL ONCE
Status: CANCELLED | OUTPATIENT
Start: 2022-07-28 | End: 2022-07-28

## 2022-07-28 RX ORDER — BACITRACIN ZINC AND POLYMYXIN B SULFATE 500; 1000 [USP'U]/G; [USP'U]/G
OINTMENT TOPICAL ONCE
Status: CANCELLED | OUTPATIENT
Start: 2022-07-28 | End: 2022-07-28

## 2022-07-28 RX ORDER — GINSENG 100 MG
CAPSULE ORAL ONCE
Status: CANCELLED | OUTPATIENT
Start: 2022-07-28 | End: 2022-07-28

## 2022-07-29 ENCOUNTER — HOSPITAL ENCOUNTER (OUTPATIENT)
Dept: WOUND CARE | Age: 57
Discharge: HOME | End: 2022-07-29
Payer: MEDICAID

## 2022-07-29 VITALS
RESPIRATION RATE: 16 BRPM | HEART RATE: 65 BPM | DIASTOLIC BLOOD PRESSURE: 76 MMHG | TEMPERATURE: 97.7 F | SYSTOLIC BLOOD PRESSURE: 129 MMHG

## 2022-07-29 PROCEDURE — 29581 APPL MULTLAYER CMPRN SYS LEG: CPT

## 2022-07-29 NOTE — DISCHARGE INSTRUCTIONS
[address] [place] [address] Physician Orders and Discharge Instructions  The [address], [address]  Telephone: [phone]    NAME:  Kaylin Adame:  1965  MEDICAL RECORD NUMBER:  [id number]  DATE:  7/29/2022      Wound care:  Continue to follow the instructions and recommendations from your last doctor visit. The dressing(s) applied is the same as your last visit. Please refer to your last discharge instruction for the information on your wound care. If there were any changes made, please follow the instructions as written here: none    Future Appointments     Future Appointments   Date Time Provider Eloina Zamarripa   8/1/2022  8:30 AM MD Trinh Nettles   8/24/2022 10:00 AM Mike NARAYAN           Your nurse  is:  Jacquelin Rose     Electronically signed by Jaren Mandujano RN on 7/29/2022 at 12:10 PM     [facility] Information: Should you experience any significant changes in your wound(s) or have questions about your wound care, please contact the [address] [place] [address] at [phone]. Our hours vary so please leave a message. Please give us 24-48 hours to return your call. If you need help with your wounds and cannot wait until we are available, contact your PCP or go to the hospital emergency room. Physician orders by:  Dr. Pepito Connell        The information contained in the After Visit Summary has been reviewed with me, the patient and/or responsible adult, by my health care provider(s). I had the opportunity to ask questions regarding this information. I have elected to receive;      [] Patient unable to sign Discharge Instructions.  Given to ECF/Transportation/POA

## 2022-07-29 NOTE — PLAN OF CARE
Multilayer Compression Wrap   (Not Unna) Below the Knee    NAME:  Krysten Beth  YOB: 1965  MEDICAL RECORD NUMBER:  [id number]  DATE:  7/29/2022       Removed old Multilayer wrap if present and washed leg with mild soap/water. Applied moisturizing agent to dry skin as needed. Applied primary and secondary dressing as ordered    Applied multilayered dressing below the knee to Right lower leg(s)  (4 Layer Compression Wrap ) . Instructed patient/caregiver not to remove dressing and to keep it clean and dry. Instructed patient/caregiver on complications to report to provider, such as pain, numbness in toes, heavy drainage, and slippage of dressing. Instructed patient on purpose of compression dressing and on activity and exercise recommendations.    Applied per   Guidelines    Electronically signed by Gisele Cardenas RN on 7/29/2022 at 12:29 PM

## 2022-08-01 ENCOUNTER — HOSPITAL ENCOUNTER (OUTPATIENT)
Dept: WOUND CARE | Age: 57
Discharge: HOME | End: 2022-08-01
Payer: MEDICAID

## 2022-08-01 VITALS — TEMPERATURE: 96.8 F | HEART RATE: 73 BPM | RESPIRATION RATE: 16 BRPM

## 2022-08-01 DIAGNOSIS — I89.0 CHRONIC ACQUIRED LYMPHEDEMA: Primary | ICD-10-CM

## 2022-08-01 DIAGNOSIS — I87.311 IDIOPATHIC CHRONIC VENOUS HYPERTENSION OF RIGHT LOWER EXTREMITY WITH ULCER (HCC): ICD-10-CM

## 2022-08-01 DIAGNOSIS — L97.919 IDIOPATHIC CHRONIC VENOUS HYPERTENSION OF RIGHT LOWER EXTREMITY WITH ULCER (HCC): ICD-10-CM

## 2022-08-01 DIAGNOSIS — Z91.199 NONCOMPLIANCE: ICD-10-CM

## 2022-08-01 PROCEDURE — 11042 DBRDMT SUBQ TIS 1ST 20SQCM/<: CPT | Performed by: SPECIALIST

## 2022-08-01 PROCEDURE — 11045 DBRDMT SUBQ TISS EACH ADDL: CPT

## 2022-08-01 PROCEDURE — 11045 DBRDMT SUBQ TISS EACH ADDL: CPT | Performed by: SPECIALIST

## 2022-08-01 PROCEDURE — 6370000000 HC RX 637 (ALT 250 FOR IP): Performed by: SPECIALIST

## 2022-08-01 PROCEDURE — 29581 APPL MULTLAYER CMPRN SYS LEG: CPT

## 2022-08-01 PROCEDURE — 11042 DBRDMT SUBQ TIS 1ST 20SQCM/<: CPT

## 2022-08-01 RX ORDER — CLOBETASOL PROPIONATE 0.5 MG/G
OINTMENT TOPICAL ONCE
Status: CANCELLED | OUTPATIENT
Start: 2022-08-01 | End: 2022-08-01

## 2022-08-01 RX ORDER — LIDOCAINE 50 MG/G
OINTMENT TOPICAL ONCE
Status: CANCELLED | OUTPATIENT
Start: 2022-08-01 | End: 2022-08-01

## 2022-08-01 RX ORDER — GINSENG 100 MG
CAPSULE ORAL ONCE
Status: CANCELLED | OUTPATIENT
Start: 2022-08-01 | End: 2022-08-01

## 2022-08-01 RX ORDER — LIDOCAINE HYDROCHLORIDE 40 MG/ML
SOLUTION TOPICAL ONCE
Status: COMPLETED | OUTPATIENT
Start: 2022-08-01 | End: 2022-08-01

## 2022-08-01 RX ORDER — BETAMETHASONE DIPROPIONATE 0.05 %
OINTMENT (GRAM) TOPICAL ONCE
Status: CANCELLED | OUTPATIENT
Start: 2022-08-01 | End: 2022-08-01

## 2022-08-01 RX ORDER — BACITRACIN ZINC AND POLYMYXIN B SULFATE 500; 1000 [USP'U]/G; [USP'U]/G
OINTMENT TOPICAL ONCE
Status: CANCELLED | OUTPATIENT
Start: 2022-08-01 | End: 2022-08-01

## 2022-08-01 RX ORDER — IBUPROFEN 200 MG
200 TABLET ORAL EVERY 6 HOURS PRN
COMMUNITY

## 2022-08-01 RX ORDER — LIDOCAINE HYDROCHLORIDE 20 MG/ML
JELLY TOPICAL ONCE
Status: CANCELLED | OUTPATIENT
Start: 2022-08-01 | End: 2022-08-01

## 2022-08-01 RX ORDER — LIDOCAINE 40 MG/G
CREAM TOPICAL ONCE
Status: CANCELLED | OUTPATIENT
Start: 2022-08-01 | End: 2022-08-01

## 2022-08-01 RX ORDER — LIDOCAINE HYDROCHLORIDE 40 MG/ML
SOLUTION TOPICAL ONCE
Status: CANCELLED | OUTPATIENT
Start: 2022-08-01 | End: 2022-08-01

## 2022-08-01 RX ORDER — BACITRACIN, NEOMYCIN, POLYMYXIN B 400; 3.5; 5 [USP'U]/G; MG/G; [USP'U]/G
OINTMENT TOPICAL ONCE
Status: CANCELLED | OUTPATIENT
Start: 2022-08-01 | End: 2022-08-01

## 2022-08-01 RX ORDER — CIPROFLOXACIN 500 MG/1
500 TABLET, FILM COATED ORAL 2 TIMES DAILY
COMMUNITY
End: 2022-08-08 | Stop reason: ALTCHOICE

## 2022-08-01 RX ORDER — GENTAMICIN SULFATE 1 MG/G
OINTMENT TOPICAL ONCE
Status: CANCELLED | OUTPATIENT
Start: 2022-08-01 | End: 2022-08-01

## 2022-08-01 RX ADMIN — LIDOCAINE HYDROCHLORIDE: 40 SOLUTION TOPICAL at 10:26

## 2022-08-01 ASSESSMENT — PAIN - FUNCTIONAL ASSESSMENT: PAIN_FUNCTIONAL_ASSESSMENT: ACTIVITIES ARE NOT PREVENTED

## 2022-08-01 ASSESSMENT — PAIN DESCRIPTION - ONSET: ONSET: ON-GOING

## 2022-08-01 ASSESSMENT — PAIN DESCRIPTION - ORIENTATION: ORIENTATION: RIGHT

## 2022-08-01 ASSESSMENT — PAIN DESCRIPTION - PAIN TYPE: TYPE: CHRONIC PAIN

## 2022-08-01 ASSESSMENT — PAIN SCALES - GENERAL: PAINLEVEL_OUTOF10: 8

## 2022-08-01 ASSESSMENT — PAIN DESCRIPTION - FREQUENCY: FREQUENCY: CONTINUOUS

## 2022-08-01 ASSESSMENT — PAIN DESCRIPTION - LOCATION: LOCATION: LEG

## 2022-08-01 ASSESSMENT — PAIN DESCRIPTION - DESCRIPTORS: DESCRIPTORS: BURNING

## 2022-08-01 NOTE — PLAN OF CARE
Multilayer Compression Wrap   (Not Unna) Below the Knee    NAME:  Kia De Leno  YOB: 1965  MEDICAL RECORD NUMBER:  [id number]  DATE:  8/1/2022       Removed old Multilayer wrap if present and washed leg with mild soap/water. Applied moisturizing agent to dry skin as needed. Applied primary and secondary dressing as ordered    Applied multilayered dressing below the knee to Right lower leg(s)  (4 Layer Compression Wrap ) . Instructed patient/caregiver not to remove dressing and to keep it clean and dry. Instructed patient/caregiver on complications to report to provider, such as pain, numbness in toes, heavy drainage, and slippage of dressing. Instructed patient on purpose of compression dressing and on activity and exercise recommendations.    Applied per   Guidelines    Electronically signed by Susu Pacheco RN on 8/1/2022 at 1:18 PM

## 2022-08-01 NOTE — PROGRESS NOTES
[facility] [place]      Kin Max  MEDICAL RECORD NUMBER:  [id number]  AGE: 62 y.o. GENDER: male  : 1965  EPISODE DATE:  2022    Subjective:     Chief Complaint   Patient presents with    Wound Check     Right lower leg           HISTORY of PRESENT ILLNESS HPI     Kin Max is a 62 y.o. male who presents today for wound/ulcer evaluation. History of Wound Context: Well-known to the wound care center having been treated for years for chronic venous insufficiency with open wounds and significant lymphedema. He has been lost to follow-up for almost 1 year. He returns today with multiple open wounds of the right lower extremity with massive edema. He states he has been trying to wrap his legs at home. He states he was recently placed on antibiotics from the podiatry clinic for his wounds. He has been maintained on his Coumadin for chronic DVT. He claims to intermittently been using his lymphedema pumps.   Wound/Ulcer Pain Timing/Severity: none  Quality of pain: N/A  Severity:  0 / 10   Modifying Factors: None  Associated Signs/Symptoms: edema and drainage    Ulcer Identification:  Ulcer Type: venous    Contributing Factors: edema, venous stasis, lymphedema, obesity, and anticoagulation therapy    Acute Wound: N/A not an acute wound    PAST MEDICAL HISTORY        Diagnosis Date    DVT (deep venous thrombosis) (HCC)     Hx of blood clots     Pain and swelling of right lower leg        PAST SURGICAL HISTORY    Past Surgical History:   Procedure Laterality Date    BALLOON ANGIOPLASTY, ARTERY Right 2017    at [facility]        FAMILY HISTORY    Family History   Problem Relation Age of Onset    Diabetes Mother     Heart Attack Father        SOCIAL HISTORY    Social History     Tobacco Use    Smoking status: Never    Smokeless tobacco: Never   Vaping Use    Vaping Use: Never used   Substance Use Topics    Alcohol use: No    Drug use: No       ALLERGIES    No Known Allergies    MEDICATIONS    Current Outpatient Medications on File Prior to Encounter   Medication Sig Dispense Refill    ciprofloxacin (CIPRO) 500 MG tablet Take 500 mg by mouth in the morning and 500 mg before bedtime. ibuprofen (ADVIL;MOTRIN) 200 MG tablet Take 200 mg by mouth every 6 hours as needed for Pain      triamcinolone (KENALOG) 0.1 % ointment Apply topically 2 times daily (Patient not taking: Reported on 8/1/2022) 453.6 g 1    warfarin (COUMADIN) 5 MG tablet TAKE ONE AND ONE-HALF TABLETS BY MOUTH DAILY EXCEPT TAKE 2 TABLETS DAILY ON FRIDAY 180 tablet 1    Compression Stockings MISC by Does not apply route Strength 30-40mmHg  Style: Other pull up compression stockings  Extremity: bilateral  Donning aid recommended: No 1 each 0     No current facility-administered medications on file prior to encounter. REVIEW OF SYSTEMS  Review of Systems    Pertinent items are noted in HPI. Objective:      Pulse 73   Temp 96.8 °F (36 °C) (Temporal)   Resp 16     Wt Readings from Last 3 Encounters:   04/25/22 251 lb 6.4 oz (114 kg)   04/18/22 251 lb 6.4 oz (114 kg)   10/27/21 244 lb (110.7 kg)       PHYSICAL EXAM    General Appearance: alert and oriented to person, place and time and obese  Extremities: no cyanosis, no clubbing, 3 + edema-  right lower extremity dermal fibrosis and hyperpigmentation. Multiple full-thickness fibrous wounds including medial lateral and posterior knee containing fibrin slough and granulation tissue periwound is dry    Assessment:      1. Chronic acquired lymphedema    2. Idiopathic chronic venous hypertension of right lower extremity with ulcer ([facility] [address]    3. Noncompliance         Procedure Note  Indications:  Based on my examination of this patient's wound(s) today, sharp excision is required to promote healing and evaluate the extent healing. Performed by: Arnaldo Yeh MD    Consent obtained?  Yes    Time out taken: Yes    Pain Control: Anesthetic: 4% Lidocaine Cream     Debridement:Excisional Debridement    Using curette the wound was sharply debrided    down through and including the removal of  epidermis, dermis, and subcutaneous tissue. Devitalized Tissue Debrided:  fibrin, biofilm, and slough      Pre Debridement Measurements:  Are located in the Wound Documentation Flow Sheet   Wound #: 1, 2, and 3     Post  Debridement Measurements:  Wound 07/25/22 Leg Right; Lower;Medial #1 (Active)   Wound Image   07/25/22 1040   Wound Etiology Venous 07/25/22 1040   Wound Cleansed Cleansed with saline 08/01/22 1026   Dressing/Treatment Alginate with Ag; Other (comment) 08/01/22 1047   Wound Length (cm) 5 cm 08/01/22 1026   Wound Width (cm) 5.6 cm 08/01/22 1026   Wound Depth (cm) 0.1 cm 08/01/22 1026   Wound Surface Area (cm^2) 28 cm^2 08/01/22 1026   Change in Wound Size % (l*w) -18.39 08/01/22 1026   Wound Volume (cm^3) 2.8 cm^3 08/01/22 1026   Wound Healing % -18 08/01/22 1026   Post-Procedure Length (cm) 5.1 cm 08/01/22 1047   Post-Procedure Width (cm) 5.7 cm 08/01/22 1047   Post-Procedure Depth (cm) 0.3 cm 08/01/22 1047   Post-Procedure Surface Area (cm^2) 29.07 cm^2 08/01/22 1047   Post-Procedure Volume (cm^3) 8.721 cm^3 08/01/22 1047   Distance Tunneling (cm) 0 cm 08/01/22 1026   Undermining Maxium Distance (cm) 0 08/01/22 1026   Wound Assessment Slough;Pink/red;Granulation tissue 08/01/22 1026   Drainage Amount Moderate 08/01/22 1026   Drainage Description Serosanguinous;Brown;Purulent 08/01/22 1026   Odor Moderate 08/01/22 1026   Kiah-wound Assessment Intact 08/01/22 1026   Margins Defined edges 08/01/22 1026   Wound Thickness Description not for Pressure Injury Full thickness 08/01/22 1026   Number of days: 7       Wound 07/25/22 Leg Right; Anterior; Lateral #2 (clustered linearly not proximally) (Active)   Wound Image   07/25/22 1040   Wound Etiology Venous 07/25/22 1040   Wound Cleansed Cleansed with saline 08/01/22 1026 Dressing/Treatment Alginate with Ag; Other (comment) 08/01/22 1047   Wound Length (cm) 4 cm 08/01/22 1026   Wound Width (cm) 11 cm 08/01/22 1026   Wound Depth (cm) 0.1 cm 08/01/22 1026   Wound Surface Area (cm^2) 44 cm^2 08/01/22 1026   Change in Wound Size % (l*w) 3.51 08/01/22 1026   Wound Volume (cm^3) 4.4 cm^3 08/01/22 1026   Wound Healing % 4 08/01/22 1026   Post-Procedure Length (cm) 4.1 cm 08/01/22 1047   Post-Procedure Width (cm) 11.1 cm 08/01/22 1047   Post-Procedure Depth (cm) 0.3 cm 08/01/22 1047   Post-Procedure Surface Area (cm^2) 45.51 cm^2 08/01/22 1047   Post-Procedure Volume (cm^3) 13.653 cm^3 08/01/22 1047   Distance Tunneling (cm) 0 cm 08/01/22 1026   Undermining Maxium Distance (cm) 0 08/01/22 1026   Wound Assessment Pink/red;Slough;Granulation tissue 08/01/22 1026   Drainage Amount Large 08/01/22 1026   Drainage Description Yellow;Brown 08/01/22 1026   Odor Moderate 08/01/22 1026   Kiah-wound Assessment Intact 08/01/22 1026   Margins Defined edges 08/01/22 1026   Wound Thickness Description not for Pressure Injury Full thickness 08/01/22 1026   Number of days: 7       Wound 07/25/22 Leg Right;Posterior; Lower #3 (Active)   Wound Image   07/25/22 1040   Wound Etiology Venous 07/25/22 1040   Wound Cleansed Cleansed with saline 08/01/22 1026   Dressing/Treatment Alginate with Ag; Other (comment) 08/01/22 1047   Wound Length (cm) 6.5 cm 08/01/22 1026   Wound Width (cm) 8 cm 08/01/22 1026   Wound Depth (cm) 0.1 cm 08/01/22 1026   Wound Surface Area (cm^2) 52 cm^2 08/01/22 1026   Change in Wound Size % (l*w) -18.18 08/01/22 1026   Wound Volume (cm^3) 5.2 cm^3 08/01/22 1026   Wound Healing % -18 08/01/22 1026   Post-Procedure Length (cm) 6.6 cm 08/01/22 1047   Post-Procedure Width (cm) 8.1 cm 08/01/22 1047   Post-Procedure Depth (cm) 0.3 cm 08/01/22 1047   Post-Procedure Surface Area (cm^2) 53.46 cm^2 08/01/22 1047   Post-Procedure Volume (cm^3) 16.038 cm^3 08/01/22 1047   Distance Tunneling (cm) 0 cm 08/01/22 1026   Undermining Maxium Distance (cm) 0 08/01/22 1026   Wound Assessment Pink/red;Slough;Granulation tissue 08/01/22 1026   Drainage Amount Moderate 08/01/22 1026   Drainage Description Yellow;Brown 08/01/22 1026   Odor Moderate 08/01/22 1026   Kiah-wound Assessment Intact 08/01/22 1026   Margins Defined edges 08/01/22 1026   Wound Thickness Description not for Pressure Injury Full thickness 08/01/22 1026   Number of days: 6          Percent of Wound Debrided: 100%    Total Surface Area Debrided:  127 sq cm    Diabetic/Pressure/Non Pressure Ulcers only:  Ulcer: Non-Pressure ulcer, fat layer exposed    Bleeding: Minimal    Hemostasis Achieved: by pressure    Procedural Pain: 3  / 10     Post Procedural Pain: 0 / 10     Response to treatment:  Well tolerated by patient. Plan:     Treatment Note: Please see attached Discharge Instructions. These instructions were given and signed by the patient or POA    New Prescriptions    No medications on file       Orders Placed This Encounter   Procedures    Initiate Outpatient Wound Care Protocol       Discharge Instructions          [facility] Physician Orders and Discharge Instructions  [address] [address]  Telephone: [phone]  NAME:  Gigi Oswald  YOB: 1965  MEDICAL RECORD NUMBER:  [id number]  DATE: 8/1/22     Return Appointment:  Return Appointment: With Dr. Zeke Schrader  in  [facility])  [x] Return Appointment for a Wound Assessment with the nurse on: Thursday or Friday    Future Appointments   Date Time Provider Eolina Zamarripa   8/24/2022 10:00 AM 6 Christine Hilliardoued: none  Medically necessary services:        [] Skilled Nursing       [] PT (Eval & Treat)   [] OT (Eval & Treat)       [] Social Work           [] Dietician  [] Other:     Wound care instructions:   If you smoke we ask that you refrain from smoking. Smoking inhibits wounds from healing. When taking antibiotics take the entire prescription as ordered. Do not stop taking until medication is all gone unless otherwise instructed. Exercise as tolerated. Keep weight off wounds and reposition every 2 hours if applicable. Do not get wounds wet in the bath or shower unless otherwise instructed by your physician. If your wound is on your foot or leg, you may purchase a cast bag. Please ask at the pharmacy. Wash hands with soap and water prior to and after every dressing change. [x]Wash wounds with: Nothing  [x]Kiah wound Topical Treatments: Do not apply lotions, creams, or ointments to the skin around the wound bed unless directed as followed:  Apply around the wound: Nothing                    [x]Wound Location: right lower leg              Apply Primary Dressing to wound: Alginate with silver pad  Secondary Dressing: Extra absorbant pad              Avoid contact of tape with skin if possible. When to change Dressing: DO NOT CHANGE                                [x] Multilayer Compression Wrap:  Type: 4 Layer Compression Wrap- right     Do not get leg(s) with wrap wet. If wraps become too tight call the center or completely remove the wrap. Elevate leg(s) above the level of the heart when sitting. Avoid prolonged standing in one place. Applied in Clinic on 7/25/2022  The Goal of this therapy is to reduce edema and get into long term compression garments to control venous insufficiency, lymphedema and reduce occurrence of venous ulcers     [x] Edema Control:  Apply: Compression Stocking on Left - 30-40mmHG                Elevate leg(s) above the level of the heart for 30 minutes 4-5 times a day and/or when sitting. Avoid prolonged standing in one place. [x] Lymphedema Therapy:  Wear Lymphedema pumps twice a day at settings prescribed by your physician. Avoid prolonged standing in one place. Dietary:  Important dietary reminders:  1. Increase Protein intake (i.e. Lean meats, fish, eggs, legumes, and yogurt)  2. No added salt  3. If diabetic, follow a diabetic diet and check glucose prior to meals or as instructed by your physician. Dietary Supplements(Take twice a day unless instructed otherwise):  [] Atlee People  [] 30ml ProStat [] Leena Medici [] Ensure Max/Premier [] Other:    Your nurse  is:  Montana Stringer     Electronically signed by Eze Pinto RN on 8/1/2022 at [facility]: Should you experience any significant changes in your wound(s) or have questions about your wound care, please contact the [address] [place] [address] at [phone]. We are open from 8:00am - 4:30p Monday, Thursday and Friday; 11:00am - 5pm on Tuesday and CLOSED Wednesday. Please give us 24-48 business hours to return your call. Call your doctor now or seek immediate medical care if:    You have symptoms of infection, such as: Increased pain, swelling, warmth, or redness. Red streaks leading from the area. Pus draining from the area. A fever.          [] Patient unable to sign Discharge Instructions given to ECF/Transportation/POA          Electronically signed by Chris Crowe MD on 8/1/2022 at 11:08 AM

## 2022-08-01 NOTE — DISCHARGE INSTRUCTIONS
[facility] Physician Orders and Discharge Instructions  [address] [address] [facility]. [address]  Telephone: [phone]  NAME:  Mariam Olivarez  YOB: 1965  MEDICAL RECORD NUMBER:  [id number]  DATE: 8/1/22     Return Appointment:  Return Appointment: With Dr. Terrance Randolph  in  1 [facility])  [x] Return Appointment for a Wound Assessment with the nurse on: Thursday or Friday    Future Appointments   Date Time Provider Eloina Zamarripa   8/24/2022 10:00 AM [address]: none  Medically necessary services:        [] Skilled Nursing       [] PT (Eval & Treat)   [] OT (Eval & Treat)       [] Social Work           [] Dietician  [] Other:     Wound care instructions: If you smoke we ask that you refrain from smoking. Smoking inhibits wounds from healing. When taking antibiotics take the entire prescription as ordered. Do not stop taking until medication is all gone unless otherwise instructed. Exercise as tolerated. Keep weight off wounds and reposition every 2 hours if applicable. Do not get wounds wet in the bath or shower unless otherwise instructed by your physician. If your wound is on your foot or leg, you may purchase a cast bag. Please ask at the pharmacy. Wash hands with soap and water prior to and after every dressing change. [x]Wash wounds with: Nothing  [x]Kiah wound Topical Treatments: Do not apply lotions, creams, or ointments to the skin around the wound bed unless directed as followed:  Apply around the wound: Nothing                    [x]Wound Location: right lower leg              Apply Primary Dressing to wound: Alginate with silver pad  Secondary Dressing: Extra absorbant pad              Avoid contact of tape with skin if possible.   When to change Dressing: DO NOT CHANGE                                [x] Multilayer Compression Wrap:  Type: 4 Layer Compression Wrap- right     Do not get leg(s) with wrap wet. If wraps become too tight call the center or completely remove the wrap. Elevate leg(s) above the level of the heart when sitting. Avoid prolonged standing in one place. Applied in Clinic on 7/25/2022  The Goal of this therapy is to reduce edema and get into long term compression garments to control venous insufficiency, lymphedema and reduce occurrence of venous ulcers     [x] Edema Control:  Apply: Compression Stocking on Left - 30-40mmHG                Elevate leg(s) above the level of the heart for 30 minutes 4-5 times a day and/or when sitting. Avoid prolonged standing in one place. [x] Lymphedema Therapy:  Wear Lymphedema pumps twice a day at settings prescribed by your physician. Avoid prolonged standing in one place. Dietary:  Important dietary reminders:  1. Increase Protein intake (i.e. Lean meats, fish, eggs, legumes, and yogurt)  2. No added salt  3. If diabetic, follow a diabetic diet and check glucose prior to meals or as instructed by your physician. Dietary Supplements(Take twice a day unless instructed otherwise):  [] [place]   [] 30ml ProStat [] Vallarie Miser [] Ensure Max/Premier [] Other:    Your nurse  is:  Carly Perla     Electronically signed by Emily King RN on 8/1/2022 at [facility]: Should you experience any significant changes in your wound(s) or have questions about your wound care, please contact the [address] [place] [address] at [phone]. We are open from 8:00am - 4:30p Monday, Thursday and Friday; 11:00am - 5pm on Tuesday and CLOSED Wednesday. Please give us 24-48 business hours to return your call. Call your doctor now or seek immediate medical care if:    You have symptoms of infection, such as: Increased pain, swelling, warmth, or redness. Red streaks leading from the area. Pus draining from the area. A fever.          [] Patient unable to sign Discharge

## 2022-08-02 ENCOUNTER — COMMUNITY OUTREACH (OUTPATIENT)
Dept: OTHER | Age: 57
End: 2022-08-02

## 2022-08-02 NOTE — PROGRESS NOTES
Pt called to see if BRITNEY-JOSEL UIS could help with a bill for wound care supplies he received from a provider that is not [facility] SW said that, unfortunately, JOSE LUIS could not help with that, but encouraged Pt to contact the supplier to ask whether financial assistance is available. Pt said he would do so.

## 2022-08-05 ENCOUNTER — HOSPITAL ENCOUNTER (OUTPATIENT)
Dept: WOUND CARE | Age: 57
Discharge: HOME | End: 2022-08-05
Payer: MEDICAID

## 2022-08-05 DIAGNOSIS — Z91.199 NONCOMPLIANCE: ICD-10-CM

## 2022-08-05 DIAGNOSIS — I87.311 IDIOPATHIC CHRONIC VENOUS HYPERTENSION OF RIGHT LOWER EXTREMITY WITH ULCER (HCC): ICD-10-CM

## 2022-08-05 DIAGNOSIS — I89.0 CHRONIC ACQUIRED LYMPHEDEMA: Primary | ICD-10-CM

## 2022-08-05 DIAGNOSIS — L97.919 IDIOPATHIC CHRONIC VENOUS HYPERTENSION OF RIGHT LOWER EXTREMITY WITH ULCER (HCC): ICD-10-CM

## 2022-08-05 PROCEDURE — 29581 APPL MULTLAYER CMPRN SYS LEG: CPT

## 2022-08-05 NOTE — DISCHARGE INSTRUCTIONS
[address] [place] [address] Physician Orders and Discharge Instructions  The [address] [address] [place] [address] [address], [address] [facility] [facility]  Telephone: [phone]    NAME:  Nirmala Penny:  1965  MEDICAL RECORD NUMBER:  [id number]  DATE:  8/5/2022      Wound care:  Continue to follow the instructions and recommendations from your last doctor visit. The dressing(s) applied is the same as your last visit. Please refer to your last discharge instruction for the information on your wound care. If there were any changes made, please follow the instructions as written here: none    Future Appointments     Future Appointments   Date Time Provider Eloina Zamarripa   8/8/2022  9:30 AM Rene Crane MD  Christine NARAYAN   8/24/2022 10:00 AM Mike Palacios [facility]           Your nurse  is:  Tomas Millan     Electronically signed by Linh Graham RN on 8/5/2022 at 11:42 AM     [address] [place] [address] Information: Should you experience any significant changes in your wound(s) or have questions about your wound care, please contact the [address] [place] [address] at [phone]. Our hours vary so please leave a message. Please give us 24-48 hours to return your call. If you need help with your wounds and cannot wait until we are available, contact your PCP or go to the hospital emergency room. Physician orders by:  Dr. Randi Kirkpatrick        The information contained in the After Visit Summary has been reviewed with me, the patient and/or responsible adult, by my health care provider(s). I had the opportunity to ask questions regarding this information. I have elected to receive;      [] Patient unable to sign Discharge Instructions.  Given to ECF/Transportation/POA

## 2022-08-05 NOTE — PLAN OF CARE
Multilayer Compression Wrap   (Not Unna) Below the Knee    NAME:  Rip Dominguez  YOB: 1965  MEDICAL RECORD NUMBER:  [id number]  DATE:  8/5/2022       Removed old Multilayer wrap if present and washed leg with mild soap/water. Applied moisturizing agent to dry skin as needed. Applied primary and secondary dressing as ordered    Applied multilayered dressing below the knee to Right lower leg(s)  (4 Layer Compression Wrap ) . Instructed patient/caregiver not to remove dressing and to keep it clean and dry. Instructed patient/caregiver on complications to report to provider, such as pain, numbness in toes, heavy drainage, and slippage of dressing. Instructed patient on purpose of compression dressing and on activity and exercise recommendations.    Applied per   Guidelines    Electronically signed by Flakito Hernandez RN on 8/5/2022 at 12:21 PM

## 2022-08-08 ENCOUNTER — HOSPITAL ENCOUNTER (OUTPATIENT)
Dept: WOUND CARE | Age: 57
Discharge: HOME | End: 2022-08-08
Payer: MEDICAID

## 2022-08-08 VITALS
SYSTOLIC BLOOD PRESSURE: 144 MMHG | DIASTOLIC BLOOD PRESSURE: 83 MMHG | TEMPERATURE: 97.5 F | RESPIRATION RATE: 16 BRPM | HEART RATE: 71 BPM

## 2022-08-08 DIAGNOSIS — L97.919 IDIOPATHIC CHRONIC VENOUS HYPERTENSION OF RIGHT LOWER EXTREMITY WITH ULCER (HCC): ICD-10-CM

## 2022-08-08 DIAGNOSIS — I89.0 CHRONIC ACQUIRED LYMPHEDEMA: Primary | ICD-10-CM

## 2022-08-08 DIAGNOSIS — I87.311 IDIOPATHIC CHRONIC VENOUS HYPERTENSION OF RIGHT LOWER EXTREMITY WITH ULCER (HCC): ICD-10-CM

## 2022-08-08 DIAGNOSIS — Z91.199 NONCOMPLIANCE: ICD-10-CM

## 2022-08-08 PROCEDURE — 29581 APPL MULTLAYER CMPRN SYS LEG: CPT

## 2022-08-08 PROCEDURE — 11045 DBRDMT SUBQ TISS EACH ADDL: CPT | Performed by: SPECIALIST

## 2022-08-08 PROCEDURE — 11045 DBRDMT SUBQ TISS EACH ADDL: CPT

## 2022-08-08 PROCEDURE — 6370000000 HC RX 637 (ALT 250 FOR IP): Performed by: SPECIALIST

## 2022-08-08 PROCEDURE — 11042 DBRDMT SUBQ TIS 1ST 20SQCM/<: CPT

## 2022-08-08 PROCEDURE — 11042 DBRDMT SUBQ TIS 1ST 20SQCM/<: CPT | Performed by: SPECIALIST

## 2022-08-08 RX ORDER — GINSENG 100 MG
CAPSULE ORAL ONCE
Status: CANCELLED | OUTPATIENT
Start: 2022-08-08 | End: 2022-08-08

## 2022-08-08 RX ORDER — CLOBETASOL PROPIONATE 0.5 MG/G
OINTMENT TOPICAL ONCE
Status: CANCELLED | OUTPATIENT
Start: 2022-08-08 | End: 2022-08-08

## 2022-08-08 RX ORDER — LIDOCAINE 40 MG/G
CREAM TOPICAL ONCE
Status: CANCELLED | OUTPATIENT
Start: 2022-08-08 | End: 2022-08-08

## 2022-08-08 RX ORDER — LIDOCAINE 50 MG/G
OINTMENT TOPICAL ONCE
Status: CANCELLED | OUTPATIENT
Start: 2022-08-08 | End: 2022-08-08

## 2022-08-08 RX ORDER — BACITRACIN ZINC AND POLYMYXIN B SULFATE 500; 1000 [USP'U]/G; [USP'U]/G
OINTMENT TOPICAL ONCE
Status: CANCELLED | OUTPATIENT
Start: 2022-08-08 | End: 2022-08-08

## 2022-08-08 RX ORDER — GENTAMICIN SULFATE 1 MG/G
OINTMENT TOPICAL ONCE
Status: CANCELLED | OUTPATIENT
Start: 2022-08-08 | End: 2022-08-08

## 2022-08-08 RX ORDER — LIDOCAINE HYDROCHLORIDE 20 MG/ML
JELLY TOPICAL ONCE
Status: CANCELLED | OUTPATIENT
Start: 2022-08-08 | End: 2022-08-08

## 2022-08-08 RX ORDER — LIDOCAINE HYDROCHLORIDE 40 MG/ML
SOLUTION TOPICAL ONCE
Status: CANCELLED | OUTPATIENT
Start: 2022-08-08 | End: 2022-08-08

## 2022-08-08 RX ORDER — BETAMETHASONE DIPROPIONATE 0.05 %
OINTMENT (GRAM) TOPICAL ONCE
Status: CANCELLED | OUTPATIENT
Start: 2022-08-08 | End: 2022-08-08

## 2022-08-08 RX ORDER — LIDOCAINE HYDROCHLORIDE 40 MG/ML
SOLUTION TOPICAL ONCE
Status: COMPLETED | OUTPATIENT
Start: 2022-08-08 | End: 2022-08-08

## 2022-08-08 RX ORDER — BACITRACIN, NEOMYCIN, POLYMYXIN B 400; 3.5; 5 [USP'U]/G; MG/G; [USP'U]/G
OINTMENT TOPICAL ONCE
Status: CANCELLED | OUTPATIENT
Start: 2022-08-08 | End: 2022-08-08

## 2022-08-08 RX ADMIN — LIDOCAINE HYDROCHLORIDE: 40 SOLUTION TOPICAL at 09:43

## 2022-08-08 ASSESSMENT — PAIN DESCRIPTION - ORIENTATION: ORIENTATION: RIGHT

## 2022-08-08 ASSESSMENT — PAIN DESCRIPTION - PAIN TYPE: TYPE: CHRONIC PAIN

## 2022-08-08 ASSESSMENT — PAIN SCALES - GENERAL: PAINLEVEL_OUTOF10: 9

## 2022-08-08 ASSESSMENT — PAIN DESCRIPTION - DESCRIPTORS: DESCRIPTORS: BURNING

## 2022-08-08 ASSESSMENT — PAIN DESCRIPTION - LOCATION: LOCATION: LEG

## 2022-08-08 ASSESSMENT — PAIN DESCRIPTION - FREQUENCY: FREQUENCY: CONTINUOUS

## 2022-08-08 NOTE — PROGRESS NOTES
No    Drug use: No       ALLERGIES    No Known Allergies    MEDICATIONS    Current Outpatient Medications on File Prior to Encounter   Medication Sig Dispense Refill    ibuprofen (ADVIL;MOTRIN) 200 MG tablet Take 200 mg by mouth every 6 hours as needed for Pain      triamcinolone (KENALOG) 0.1 % ointment Apply topically 2 times daily (Patient not taking: Reported on 8/1/2022) 453.6 g 1    warfarin (COUMADIN) 5 MG tablet TAKE ONE AND ONE-HALF TABLETS BY MOUTH DAILY EXCEPT TAKE 2 TABLETS DAILY ON FRIDAY 180 tablet 1    Compression Stockings MISC by Does not apply route Strength 30-40mmHg  Style: Other pull up compression stockings  Extremity: bilateral  Donning aid recommended: No 1 each 0     No current facility-administered medications on file prior to encounter. REVIEW OF SYSTEMS  Review of Systems    Pertinent items are noted in HPI. Objective:      BP (!) 144/83   Pulse 71   Temp 97.5 °F (36.4 °C) (Temporal)   Resp 16     Wt Readings from Last 3 Encounters:   04/25/22 251 lb 6.4 oz (114 kg)   04/18/22 251 lb 6.4 oz (114 kg)   10/27/21 244 lb (110.7 kg)       PHYSICAL EXAM    Extremities: no cyanosis, no clubbing, 3 + edema-  right lower extremity dermal fibrosis and hyperpigmentation. Multiple full-thickness fibrous wounds including medial lateral and posterior knee containing fibrin slough and granulation tissue periwound is dry    Assessment:      1. Chronic acquired lymphedema    2. Idiopathic chronic venous hypertension of right lower extremity with ulcer ([address]    3. Noncompliance         Procedure Note  Indications:  Based on my examination of this patient's wound(s) today, sharp excision is required to promote healing and evaluate the extent healing. Performed by: Nadira Morris MD    Consent obtained?  Yes    Time out taken: Yes    Pain Control: Anesthetic: 4% Lidocaine Liquid Topical     Debridement:Excisional Debridement    Using curette the wound was sharply debrided    down through and 08/08/22 0944   Wound Surface Area (cm^2) 46 cm^2 08/08/22 0944   Change in Wound Size % (l*w) -0.88 08/08/22 0944   Wound Volume (cm^3) 4.6 cm^3 08/08/22 0944   Wound Healing % -1 08/08/22 0944   Post-Procedure Length (cm) 4.1 cm 08/08/22 1025   Post-Procedure Width (cm) 11.6 cm 08/08/22 1025   Post-Procedure Depth (cm) 0.3 cm 08/08/22 1025   Post-Procedure Surface Area (cm^2) 47.56 cm^2 08/08/22 1025   Post-Procedure Volume (cm^3) 14.268 cm^3 08/08/22 1025   Distance Tunneling (cm) 0 cm 08/05/22 1213   Undermining Maxium Distance (cm) 0 08/05/22 [address]; Purple/maroon;Dusky;Pink/red 08/08/22 0944   Drainage Amount Moderate 08/08/22 0944   Drainage Description Yellow;Brown 08/08/22 0944   Odor Moderate 08/08/22 0944   Kiah-wound Assessment Erosion 08/08/22 0944   Margins Defined edges 08/08/22 0944   Wound Thickness Description not for Pressure Injury Full thickness 08/08/22 0944   Number of days: 14       Wound 07/25/22 Leg Right;Posterior; Lower #3 (Active)   Wound Image   07/25/22 1040   Wound Etiology Venous 07/25/22 1040   Wound Cleansed Cleansed with saline 08/08/22 0944   Dressing/Treatment Alginate with Ag; Other (comment) 08/01/22 1047   Wound Length (cm) 6.3 cm 08/08/22 0944   Wound Width (cm) 8 cm 08/08/22 0944   Wound Depth (cm) 0.1 cm 08/08/22 0944   Wound Surface Area (cm^2) 50.4 cm^2 08/08/22 0944   Change in Wound Size % (l*w) -14.55 08/08/22 0944   Wound Volume (cm^3) 5.04 cm^3 08/08/22 0944   Wound Healing % -15 08/08/22 0944   Post-Procedure Length (cm) 6.4 cm 08/08/22 1025   Post-Procedure Width (cm) 8.1 cm 08/08/22 1025   Post-Procedure Depth (cm) 0.3 cm 08/08/22 1025   Post-Procedure Surface Area (cm^2) 51.84 cm^2 08/08/22 1025   Post-Procedure Volume (cm^3) 15.552 cm^3 08/08/22 1025   Distance Tunneling (cm) 0 cm 08/08/22 0944   Undermining Maxium Distance (cm) 0 08/08/22 0944   Wound Assessment Pink/red;Fibrin 08/08/22 0944   Drainage Amount Moderate 08/08/22 0944 Drainage Description Yellow;Brown 08/08/22 0944   Odor Moderate 08/08/22 0944   Kiah-wound Assessment Intact 08/08/22 0944   Margins Defined edges 08/08/22 0944   Wound Thickness Description not for Pressure Injury Full thickness 08/08/22 0944   Number of days: 14          Percent of Wound Debrided: 100%    Total Surface Area Debrided:  123 sq cm    Diabetic/Pressure/Non Pressure Ulcers only:  Ulcer: Non-Pressure ulcer, fat layer exposed    Bleeding: Minimal    Hemostasis Achieved: by pressure    Procedural Pain: 0  / 10     Post Procedural Pain: 0 / 10     Response to treatment:  Well tolerated by patient. We will continue with twice weekly dressing changes along with reinforcement for utilization of his lymphedema pumps        Plan:     Treatment Note: Please see attached Discharge Instructions. These instructions were given and signed by the patient or POA    New Prescriptions    No medications on file       Orders Placed This Encounter   Procedures    Initiate Outpatient Wound Care Protocol       Discharge Instructions          215 [facility] Physician Orders and Discharge Instructions  [address] [address]  Telephone: [phone]  NAME:  Shelley Linares  YOB: 1965  MEDICAL RECORD NUMBER:  [id number]  DATE: 8/8/22     Return Appointment:  Return Appointment: With Dr. Alberto Mckinley  in  1 [facility])  [x] Return Appointment for a Wound Assessment with the nurse on: 08/12/22    Future Appointments   Date Time Provider Eloina Zamarripa   8/24/2022 10:00 AM [address]: none  Medically necessary services: [] Skilled Nursing [] PT (Eval & Treat) [] OT (Eval & Treat) [] Social Work [] Dietician  [] Other:    Wound care instructions: If you smoke we ask that you refrain from smoking. Smoking inhibits wounds from healing.   When taking antibiotics take the entire prescription as ordered. Do not stop taking until medication is all gone unless otherwise instructed. Exercise as tolerated. Keep weight off wounds and reposition every 2 hours if applicable. Do not get wounds wet in the bath or shower unless otherwise instructed by your physician. If your wound is on your foot or leg, you may purchase a cast bag. Please ask at the pharmacy. Wash hands with soap and water prior to and after every dressing change. [x]Wash wounds with: Vashe wash - Apply enough Vashe to soak a piece of gauze and place on wound bed for 5-10 minutes. No need to rinse after soaking. [x]Kiah wound Topical Treatments: Do not apply lotions, creams, or ointments to the skin around the wound bed unless directed as followed:   Apply around the wound: Nothing         [x]Wound Location: right lower legs   Apply Primary Dressing to wound: Alginate with silver pad  Secondary Dressing: 4X8 gauze pad   Avoid contact of tape with skin if possible. When to change Dressing: DO NOT CHANGE        [x] Multilayer Compression Wrap:  Type: 4 Layer Compression Wrap- right    Do not get leg(s) with wrap wet. If wraps become too tight call the center or completely remove the wrap. Elevate leg(s) above the level of the heart when sitting. Avoid prolonged standing in one place. Applied in Clinic on 8/8/2022  The Goal of this therapy is to reduce edema and get into long term compression garments to control venous insufficiency, lymphedema and reduce occurrence of venous ulcers    [x] Edema Control:  Apply: Compression Stocking on Left  - 30-40mmHG   Elevate leg(s) above the level of the heart for 30 minutes 4-5 times a day and/or when sitting. Avoid prolonged standing in one place. [x] Lymphedema Therapy:  Wear Lymphedema pumps twice a day at settings prescribed by your physician. Avoid prolonged standing in one place. Dietary:  Important dietary reminders:  1.  Increase Protein intake (i.e. Lean meats, fish, eggs, legumes, and yogurt)  2. No added salt  3. If diabetic, follow a diabetic diet and check glucose prior to meals or as instructed by your physician. Dietary Supplements(Take twice a day unless instructed otherwise):  [] [place]  [] 30ml ProStat [] Gordy Sour [] Ensure Max/Premier [] Other:    Your nurse  is:  [address] [place] [address]     Electronically signed by Citlalli Wood RN on 8/8/2022 at 10:30 AM     [address] [facility] Information: Should you experience any significant changes in your wound(s) or have questions about your wound care, please contact the [address] [place] [address] at [phone]. We are open from 8:00am - 4:30p Monday, Thursday and Friday; 11:00am - 5pm on Tuesday and CLOSED Wednesday. Please give us 24-48 business hours to return your call. Call your doctor now or seek immediate medical care if:    You have symptoms of infection, such as: Increased pain, swelling, warmth, or redness. Red streaks leading from the area. Pus draining from the area. A fever.          [] Patient unable to sign Discharge Instructions given to ECF/Transportation/POA         Electronically signed by Daniela Sage MD on 8/8/2022 at 12:04 PM

## 2022-08-08 NOTE — DISCHARGE INSTRUCTIONS
215 [facility] Physician Orders and Discharge Instructions  [address] [address] [facility]. [address]  Telephone: [phone]  NAME:  Robert Quick  YOB: 1965  MEDICAL RECORD NUMBER:  [id number]  DATE: 8/8/22     Return Appointment:  Return Appointment: With Dr. Angélica Richardson  in  1 [facility])  [x] Return Appointment for a Wound Assessment with the nurse on: 08/12/22    Future Appointments   Date Time Provider Eloina Zamarripa   8/24/2022 10:00 AM [address]: none  Medically necessary services: [] Skilled Nursing [] PT (Eval & Treat) [] OT (Eval & Treat) [] Social Work [] Dietician  [] Other:    Wound care instructions: If you smoke we ask that you refrain from smoking. Smoking inhibits wounds from healing. When taking antibiotics take the entire prescription as ordered. Do not stop taking until medication is all gone unless otherwise instructed. Exercise as tolerated. Keep weight off wounds and reposition every 2 hours if applicable. Do not get wounds wet in the bath or shower unless otherwise instructed by your physician. If your wound is on your foot or leg, you may purchase a cast bag. Please ask at the pharmacy. Wash hands with soap and water prior to and after every dressing change. [x]Wash wounds with: Vashe wash - Apply enough Vashe to soak a piece of gauze and place on wound bed for 5-10 minutes. No need to rinse after soaking. [x]Kiah wound Topical Treatments: Do not apply lotions, creams, or ointments to the skin around the wound bed unless directed as followed:   Apply around the wound: Nothing         [x]Wound Location: right lower legs   Apply Primary Dressing to wound: Alginate with silver pad  Secondary Dressing: 4X8 gauze pad   Avoid contact of tape with skin if possible.   When to change Dressing: DO NOT CHANGE        [x] Multilayer Compression Wrap:  Type: 4 Layer Compression Wrap- right    Do not get leg(s) with wrap wet. If wraps become too tight call the center or completely remove the wrap. Elevate leg(s) above the level of the heart when sitting. Avoid prolonged standing in one place. Applied in Clinic on 8/8/2022  The Goal of this therapy is to reduce edema and get into long term compression garments to control venous insufficiency, lymphedema and reduce occurrence of venous ulcers    [x] Edema Control:  Apply: Compression Stocking on Left  - 30-40mmHG   Elevate leg(s) above the level of the heart for 30 minutes 4-5 times a day and/or when sitting. Avoid prolonged standing in one place. [x] Lymphedema Therapy:  Wear Lymphedema pumps twice a day at settings prescribed by your physician. Avoid prolonged standing in one place. Dietary:  Important dietary reminders:  1. Increase Protein intake (i.e. Lean meats, fish, eggs, legumes, and yogurt)  2. No added salt  3. If diabetic, follow a diabetic diet and check glucose prior to meals or as instructed by your physician. Dietary Supplements(Take twice a day unless instructed otherwise):  [] Lindsey Downser  [] 30ml ProStat [] Caretha Corydon [] Ensure Max/Premier [] Other:    Your nurse  is:  Karen Escobar     Electronically signed by Minda Rushing RN on 8/8/2022 at 10:30 AM     215 [facility] Information: Should you experience any significant changes in your wound(s) or have questions about your wound care, please contact the [address] [place] [address] at [phone]. We are open from 8:00am - 4:30p Monday, Thursday and Friday; 11:00am - 5pm on Tuesday and CLOSED Wednesday. Please give us 24-48 business hours to return your call. Call your doctor now or seek immediate medical care if:    You have symptoms of infection, such as: Increased pain, swelling, warmth, or redness. Red streaks leading from the area. Pus draining from the area. A fever.          [] Patient unable to sign Discharge Instructions given to ECF/Transportation/POA

## 2022-08-08 NOTE — PROGRESS NOTES
Multilayer Compression Wrap   (Not Unna) Below the Knee    NAME:  Patricia Clinton  YOB: 1965  MEDICAL RECORD NUMBER:  [id number]  DATE:  8/8/2022       Removed old Multilayer wrap if present and washed leg with mild soap/water. Applied moisturizing agent to dry skin as needed. Applied primary and secondary dressing as ordered    Applied multilayered dressing below the knee to Right lower leg(s)  (4 Layer Compression Wrap ) . Instructed patient/caregiver not to remove dressing and to keep it clean and dry. Instructed patient/caregiver on complications to report to provider, such as pain, numbness in toes, heavy drainage, and slippage of dressing. Instructed patient on purpose of compression dressing and on activity and exercise recommendations.    Applied per   Guidelines    Electronically signed by Linnette Wall RN on 8/8/2022 at 10:43 AM

## 2022-08-12 ENCOUNTER — HOSPITAL ENCOUNTER (OUTPATIENT)
Dept: WOUND CARE | Age: 57
Discharge: HOME | End: 2022-08-12

## 2022-08-12 VITALS
HEART RATE: 77 BPM | RESPIRATION RATE: 16 BRPM | DIASTOLIC BLOOD PRESSURE: 84 MMHG | TEMPERATURE: 97.4 F | SYSTOLIC BLOOD PRESSURE: 145 MMHG

## 2022-08-12 DIAGNOSIS — Z91.199 NONCOMPLIANCE: ICD-10-CM

## 2022-08-12 DIAGNOSIS — I87.311 IDIOPATHIC CHRONIC VENOUS HYPERTENSION OF RIGHT LOWER EXTREMITY WITH ULCER (HCC): ICD-10-CM

## 2022-08-12 DIAGNOSIS — I89.0 CHRONIC ACQUIRED LYMPHEDEMA: Primary | ICD-10-CM

## 2022-08-12 DIAGNOSIS — L97.919 IDIOPATHIC CHRONIC VENOUS HYPERTENSION OF RIGHT LOWER EXTREMITY WITH ULCER (HCC): ICD-10-CM

## 2022-08-12 ASSESSMENT — PAIN - FUNCTIONAL ASSESSMENT: PAIN_FUNCTIONAL_ASSESSMENT: ACTIVITIES ARE NOT PREVENTED

## 2022-08-12 ASSESSMENT — PAIN SCALES - GENERAL: PAINLEVEL_OUTOF10: 8

## 2022-08-12 ASSESSMENT — PAIN DESCRIPTION - DESCRIPTORS: DESCRIPTORS: BURNING

## 2022-08-12 ASSESSMENT — PAIN DESCRIPTION - LOCATION: LOCATION: LEG

## 2022-08-12 ASSESSMENT — PAIN DESCRIPTION - ORIENTATION: ORIENTATION: RIGHT

## 2022-08-12 ASSESSMENT — PAIN DESCRIPTION - PAIN TYPE: TYPE: CHRONIC PAIN

## 2022-08-12 ASSESSMENT — PAIN DESCRIPTION - ONSET: ONSET: ON-GOING

## 2022-08-12 ASSESSMENT — PAIN DESCRIPTION - FREQUENCY: FREQUENCY: CONTINUOUS

## 2022-08-12 NOTE — DISCHARGE INSTRUCTIONS
[address] [place] [address] Physician Orders and Discharge Instructions  [address] [address] [address] [address] [facility] [address]  Telephone: [phone]    NAME:  Aj Kent:  1965  MEDICAL RECORD NUMBER:  [id number]  DATE:  8/12/2022      Wound care:  Continue to follow the instructions and recommendations from your last doctor visit. The dressing(s) applied is the same as your last visit. Please refer to your last discharge instruction for the information on your wound care. If there were any changes made, please follow the instructions as written here:     Future Appointments     Future Appointments   Date Time Provider Eloina Zamarripa   8/15/2022  9:30 AM Estela Yan MD  Christine NARAYAN   8/24/2022 10:00 AM Mike NARAYAN             Your nurse  is:  Jacquelin Rose     Electronically signed by Merline Singh RN on 8/12/2022 at 10:18 AM     [address] [place] [address] Information: Should you experience any significant changes in your wound(s) or have questions about your wound care, please contact the [address] [place] [address] at [phone]. Our hours vary so please leave a message. Please give us 24-48 hours to return your call. If you need help with your wounds and cannot wait until we are available, contact your PCP or go to the hospital emergency room. Physician orders by:  Dr. Chen Faust        The information contained in the After Visit Summary has been reviewed with me, the patient and/or responsible adult, by my health care provider(s). I had the opportunity to ask questions regarding this information. I have elected to receive;      [] Patient unable to sign Discharge Instructions.  Given t ECF/Transportation/POA

## 2022-08-15 ENCOUNTER — HOSPITAL ENCOUNTER (OUTPATIENT)
Dept: WOUND CARE | Age: 57
Discharge: HOME | End: 2022-08-15
Payer: MEDICAID

## 2022-08-15 VITALS
HEART RATE: 77 BPM | RESPIRATION RATE: 16 BRPM | DIASTOLIC BLOOD PRESSURE: 75 MMHG | TEMPERATURE: 97.9 F | SYSTOLIC BLOOD PRESSURE: 119 MMHG

## 2022-08-15 DIAGNOSIS — L97.919 IDIOPATHIC CHRONIC VENOUS HYPERTENSION OF RIGHT LOWER EXTREMITY WITH ULCER (HCC): ICD-10-CM

## 2022-08-15 DIAGNOSIS — I87.311 IDIOPATHIC CHRONIC VENOUS HYPERTENSION OF RIGHT LOWER EXTREMITY WITH ULCER (HCC): ICD-10-CM

## 2022-08-15 DIAGNOSIS — I89.0 CHRONIC ACQUIRED LYMPHEDEMA: Primary | ICD-10-CM

## 2022-08-15 DIAGNOSIS — Z91.199 NONCOMPLIANCE: ICD-10-CM

## 2022-08-15 PROCEDURE — 87176 TISSUE HOMOGENIZATION CULTR: CPT

## 2022-08-15 PROCEDURE — 87181 SC STD AGAR DILUTION PER AGT: CPT

## 2022-08-15 PROCEDURE — 11042 DBRDMT SUBQ TIS 1ST 20SQCM/<: CPT | Performed by: SPECIALIST

## 2022-08-15 PROCEDURE — 87077 CULTURE AEROBIC IDENTIFY: CPT

## 2022-08-15 PROCEDURE — 11045 DBRDMT SUBQ TISS EACH ADDL: CPT | Performed by: SPECIALIST

## 2022-08-15 PROCEDURE — 87205 SMEAR GRAM STAIN: CPT

## 2022-08-15 PROCEDURE — 87076 CULTURE ANAEROBE IDENT EACH: CPT

## 2022-08-15 PROCEDURE — 87070 CULTURE OTHR SPECIMN AEROBIC: CPT

## 2022-08-15 PROCEDURE — 6370000000 HC RX 637 (ALT 250 FOR IP): Performed by: SPECIALIST

## 2022-08-15 PROCEDURE — 11042 DBRDMT SUBQ TIS 1ST 20SQCM/<: CPT

## 2022-08-15 PROCEDURE — 11045 DBRDMT SUBQ TISS EACH ADDL: CPT

## 2022-08-15 PROCEDURE — 87186 SC STD MICRODIL/AGAR DIL: CPT

## 2022-08-15 PROCEDURE — 29581 APPL MULTLAYER CMPRN SYS LEG: CPT

## 2022-08-15 RX ORDER — CLOBETASOL PROPIONATE 0.5 MG/G
OINTMENT TOPICAL ONCE
Status: CANCELLED | OUTPATIENT
Start: 2022-08-15 | End: 2022-08-15

## 2022-08-15 RX ORDER — LIDOCAINE HYDROCHLORIDE 40 MG/ML
SOLUTION TOPICAL ONCE
Status: CANCELLED | OUTPATIENT
Start: 2022-08-15 | End: 2022-08-15

## 2022-08-15 RX ORDER — LIDOCAINE HYDROCHLORIDE 20 MG/ML
JELLY TOPICAL ONCE
Status: CANCELLED | OUTPATIENT
Start: 2022-08-15 | End: 2022-08-15

## 2022-08-15 RX ORDER — LIDOCAINE 40 MG/G
CREAM TOPICAL ONCE
Status: CANCELLED | OUTPATIENT
Start: 2022-08-15 | End: 2022-08-15

## 2022-08-15 RX ORDER — LIDOCAINE 50 MG/G
OINTMENT TOPICAL ONCE
Status: CANCELLED | OUTPATIENT
Start: 2022-08-15 | End: 2022-08-15

## 2022-08-15 RX ORDER — GINSENG 100 MG
CAPSULE ORAL ONCE
Status: CANCELLED | OUTPATIENT
Start: 2022-08-15 | End: 2022-08-15

## 2022-08-15 RX ORDER — GENTAMICIN SULFATE 1 MG/G
OINTMENT TOPICAL ONCE
Status: CANCELLED | OUTPATIENT
Start: 2022-08-15 | End: 2022-08-15

## 2022-08-15 RX ORDER — BACITRACIN ZINC AND POLYMYXIN B SULFATE 500; 1000 [USP'U]/G; [USP'U]/G
OINTMENT TOPICAL ONCE
Status: CANCELLED | OUTPATIENT
Start: 2022-08-15 | End: 2022-08-15

## 2022-08-15 RX ORDER — LIDOCAINE HYDROCHLORIDE 40 MG/ML
SOLUTION TOPICAL ONCE
Status: COMPLETED | OUTPATIENT
Start: 2022-08-15 | End: 2022-08-15

## 2022-08-15 RX ORDER — BETAMETHASONE DIPROPIONATE 0.05 %
OINTMENT (GRAM) TOPICAL ONCE
Status: CANCELLED | OUTPATIENT
Start: 2022-08-15 | End: 2022-08-15

## 2022-08-15 RX ORDER — BACITRACIN, NEOMYCIN, POLYMYXIN B 400; 3.5; 5 [USP'U]/G; MG/G; [USP'U]/G
OINTMENT TOPICAL ONCE
Status: CANCELLED | OUTPATIENT
Start: 2022-08-15 | End: 2022-08-15

## 2022-08-15 RX ADMIN — LIDOCAINE HYDROCHLORIDE: 40 SOLUTION TOPICAL at 10:06

## 2022-08-15 ASSESSMENT — PAIN DESCRIPTION - PAIN TYPE: TYPE: CHRONIC PAIN

## 2022-08-15 ASSESSMENT — PAIN DESCRIPTION - ORIENTATION: ORIENTATION: RIGHT

## 2022-08-15 ASSESSMENT — PAIN DESCRIPTION - LOCATION: LOCATION: LEG

## 2022-08-15 ASSESSMENT — PAIN DESCRIPTION - DESCRIPTORS: DESCRIPTORS: BURNING

## 2022-08-15 ASSESSMENT — PAIN DESCRIPTION - FREQUENCY: FREQUENCY: CONTINUOUS

## 2022-08-15 ASSESSMENT — PAIN SCALES - GENERAL: PAINLEVEL_OUTOF10: 9

## 2022-08-15 NOTE — PROGRESS NOTES
[address]  Progress Note and Procedure Note      Estee Baker  MEDICAL RECORD NUMBER:  [id number]  AGE: 62 y.o. GENDER: male  : 1965  EPISODE DATE:  8/15/2022    Subjective:     Chief Complaint   Patient presents with    Wound Check     Right lower leg         HISTORY of PRESENT ILLNESS HPI     Estee Baker is a 62 y.o. male who presents today for wound/ulcer evaluation. History of Wound Context: Well-known to the wound care center having been treated for years for chronic venous insufficiency with open wounds and significant lymphedema. He has been lost to follow-up for almost 1 year. He returns today with multiple open wounds of the right lower extremity with massive edema. He states he has been trying to wrap his legs at home. He states he was recently placed on antibiotics from the podiatry clinic for his wounds. He has been maintained on his Coumadin for chronic DVT. He claims to intermittently been using his lymphedema pumps.   Wound/Ulcer Pain Timing/Severity: none  Quality of pain: N/A  Severity:  0 / 10   Modifying Factors: None  Associated Signs/Symptoms: edema and drainage    Ulcer Identification:  Ulcer Type: venous    Contributing Factors: edema, venous stasis, lymphedema, obesity, and anticoagulation therapy    Acute Wound: N/A not an acute wound    PAST MEDICAL HISTORY        Diagnosis Date    DVT (deep venous thrombosis) (HCC)     Hx of blood clots     Pain and swelling of right lower leg        PAST SURGICAL HISTORY    Past Surgical History:   Procedure Laterality Date    BALLOON ANGIOPLASTY, ARTERY Right 2017    at [facility] Right        FAMILY HISTORY    Family History   Problem Relation Age of Onset    Diabetes Mother     Heart Attack Father        SOCIAL HISTORY    Social History     Tobacco Use    Smoking status: Never    Smokeless tobacco: Never   Vaping Use    Vaping Use: Never used   Substance Use Topics    Alcohol use: No    Drug use: No       ALLERGIES    No Known Allergies    MEDICATIONS    Current Outpatient Medications on File Prior to Encounter   Medication Sig Dispense Refill    ibuprofen (ADVIL;MOTRIN) 200 MG tablet Take 200 mg by mouth every 6 hours as needed for Pain      triamcinolone (KENALOG) 0.1 % ointment Apply topically 2 times daily (Patient not taking: No sig reported) 453.6 g 1    warfarin (COUMADIN) 5 MG tablet TAKE ONE AND ONE-HALF TABLETS BY MOUTH DAILY EXCEPT TAKE 2 TABLETS DAILY ON FRIDAY 180 tablet 1    Compression Stockings MISC by Does not apply route Strength 30-40mmHg  Style: Other pull up compression stockings  Extremity: bilateral  Donning aid recommended: No 1 each 0     No current facility-administered medications on file prior to encounter. REVIEW OF SYSTEMS  Review of Systems    Pertinent items are noted in HPI. Objective:      /75   Pulse 77   Temp 97.9 °F (36.6 °C) (Temporal)   Resp 16     Wt Readings from Last 3 Encounters:   04/25/22 251 lb 6.4 oz (114 kg)   04/18/22 251 lb 6.4 oz (114 kg)   10/27/21 244 lb (110.7 kg)       PHYSICAL EXAM    Extremities: no cyanosis, no clubbing, 3 + edema-  right lower extremity dermal fibrosis and hyperpigmentation. Multiple full-thickness fibrous wounds including medial lateral and posterior knee containing fibrin slough and granulation tissue periwound is dry   no cyanosis, no clubbing, 3 + edema-  right lower extremity dermal fibrosis and hyperpigmentation. Multiple full-thickness fibrous wounds including medial lateral and posterior knee containing fibrin slough and granulation tissue periwound is dry  Presence of epithelialization at the margins of all wounds    Assessment:      1. Chronic acquired lymphedema    2. Idiopathic chronic venous hypertension of right lower extremity with ulcer ([facility] [address]    3.  Noncompliance         Procedure Note  Indications:  Based on my examination of this patient's wound(s) today, sharp excision is required to promote healing and evaluate the extent healing. Performed by: Jay Jenkins MD    Consent obtained? Yes    Time out taken: Yes    Pain Control: Anesthetic: 4% Lidocaine Liquid Topical     Debridement:Excisional Debridement    Using curette the wound was sharply debrided    down through and including the removal of  epidermis, dermis, and subcutaneous tissue. Devitalized Tissue Debrided:  fibrin, biofilm, and slough      Pre Debridement Measurements:  Are located in the Wound Documentation Flow Sheet   Wound #: 1, 2, and 3     Post  Debridement Measurements:  Wound 07/25/22 Leg Right; Lower;Medial #1 (Active)   Wound Image   07/25/22 1040   Wound Etiology Venous 07/25/22 1040   Wound Cleansed Cleansed with saline 08/15/22 0951   Dressing/Treatment Alginate with Ag 08/15/22 1039   Wound Length (cm) 3.6 cm 08/15/22 0951   Wound Width (cm) 5 cm 08/15/22 0951   Wound Depth (cm) 0.1 cm 08/15/22 0951   Wound Surface Area (cm^2) 18 cm^2 08/15/22 0951   Change in Wound Size % (l*w) 23.89 08/15/22 0951   Wound Volume (cm^3) 1.8 cm^3 08/15/22 0951   Wound Healing % 24 08/15/22 0951   Post-Procedure Length (cm) 3.7 cm 08/15/22 1027   Post-Procedure Width (cm) 5.1 cm 08/15/22 1027   Post-Procedure Depth (cm) 0.3 cm 08/15/22 1027   Post-Procedure Surface Area (cm^2) 18.87 cm^2 08/15/22 1027   Post-Procedure Volume (cm^3) 5.661 cm^3 08/15/22 1027   Distance Tunneling (cm) 0 cm 08/01/22 1026   Undermining Maxium Distance (cm) 0 08/01/22 1026   Wound Assessment Fibrin;Pink/red 08/15/22 0951   Drainage Amount Moderate 08/15/22 0951   Drainage Description Serosanguinous;Purulent;Brown 08/15/22 0951   Odor Moderate 08/15/22 0951   Kiah-wound Assessment Intact 08/15/22 0951   Margins Defined edges 08/15/22 0951   Wound Thickness Description not for Pressure Injury Full thickness 08/12/22 1019   Number of days: 21       Wound 07/25/22 Leg Right; Anterior; Lateral #2 (clustered linearly not proximally) (Active)   Wound Image   07/25/22 1040   Wound Etiology Venous 07/25/22 1040   Wound Cleansed Cleansed with saline 08/15/22 0951   Dressing/Treatment Alginate with Ag 08/15/22 1039   Wound Length (cm) 3 cm 08/15/22 0951   Wound Width (cm) 10 cm 08/15/22 0951   Wound Depth (cm) 0.1 cm 08/15/22 0951   Wound Surface Area (cm^2) 30 cm^2 08/15/22 0951   Change in Wound Size % (l*w) 34.21 08/15/22 0951   Wound Volume (cm^3) 3 cm^3 08/15/22 0951   Wound Healing % 34 08/15/22 0951   Post-Procedure Length (cm) 3.1 cm 08/15/22 1027   Post-Procedure Width (cm) 10.1 cm 08/15/22 1027   Post-Procedure Depth (cm) 0.3 cm 08/15/22 1027   Post-Procedure Surface Area (cm^2) 31.31 cm^2 08/15/22 1027   Post-Procedure Volume (cm^3) 9.393 cm^3 08/15/22 1027   Distance Tunneling (cm) 0 cm 08/05/22 1213   Undermining Maxium Distance (cm) 0 08/05/22 1213   Wound Assessment Purple/maroon;Pink/red;Slough 08/15/22 0951   Drainage Amount Moderate 08/15/22 0951   Drainage Description Serosanguinous;Brown;Purulent 08/15/22 0951   Odor Moderate 08/15/22 0951   Kiah-wound Assessment Intact 08/15/22 0951   Margins Unattached edges 08/15/22 0951   Wound Thickness Description not for Pressure Injury Full thickness 08/15/22 0951   Number of days: 21       Wound 07/25/22 Leg Right;Posterior; Lower #3 (Active)   Wound Image   07/25/22 1040   Wound Etiology Venous 07/25/22 1040   Wound Cleansed Cleansed with saline 08/15/22 0951   Dressing/Treatment Alginate with Ag 08/15/22 1039   Wound Length (cm) 5.8 cm 08/15/22 0951   Wound Width (cm) 6.5 cm 08/15/22 0951   Wound Depth (cm) 0.1 cm 08/15/22 0951   Wound Surface Area (cm^2) 37.7 cm^2 08/15/22 0951   Change in Wound Size % (l*w) 14.32 08/15/22 0951   Wound Volume (cm^3) 3.77 cm^3 08/15/22 0951   Wound Healing % 14 08/15/22 0951   Post-Procedure Length (cm) 5.9cm 08/15/22 1027   Post-Procedure Width (cm) 6.6 cm 08/15/22 1027   Post-Procedure Depth (cm) 0.3 cm 08/15/22 1027   Post-Procedure Surface Area (cm^2) 19.14 cm^2 08/15/22 1027   Post-Procedure Volume (cm^3) 5.742 cm^3 08/15/22 1027   Distance Tunneling (cm) 0 cm 08/08/22 0944   Undermining Maxium Distance (cm) 0 08/08/22 0944   Wound Assessment Pink/red;Fibrin 08/15/22 0951   Drainage Amount Moderate 08/15/22 0951   Drainage Description Serosanguinous;Brown;Purulent 08/15/22 0951   Odor Moderate 08/15/22 0951   Kiah-wound Assessment Intact 08/15/22 0951   Margins Defined edges 08/15/22 0951   Wound Thickness Description not for Pressure Injury Full thickness 08/12/22 1019   Number of days: 21          Percent of Wound Debrided: 100%    Total Surface Area Debrided:  86 sq cm    Diabetic/Pressure/Non Pressure Ulcers only:  Ulcer: Non-Pressure ulcer, fat layer exposed    Bleeding: Minimal    Hemostasis Achieved: by pressure    Procedural Pain: 0  / 10     Post Procedural Pain: 0 / 10     Response to treatment:  Well tolerated by patient. There has been improvement in wound measurements. Tissue culture obtained today        Plan:     Treatment Note: Please see attached Discharge Instructions. These instructions were given and signed by the patient or POA    New Prescriptions    No medications on file       Orders Placed This Encounter   Procedures    Initiate Outpatient Wound Care Protocol    Culture, Tissue       Discharge [address] [facility] Physician Orders and Discharge Instructions  [address] [place] [address]. [place] [address]  Telephone: [phone]  NAME:  Colbert Cushing  YOB: 1965  MEDICAL RECORD NUMBER:  [id number]  DATE: 8/15/22     Return Appointment:  Return Appointment: With Dr. Eulalia Mims  in  1 [facility])  [x] Return Appointment for a Wound Assessment with the nurse on: 08/19/22    Future Appointments   Date Time Provider Eloina Zamarripa   8/24/2022 10:00 AM [address]: none  Medically necessary services: [] Skilled Nursing [] PT (Eval & Treat) [] OT (Eval & Treat) [] Social Work [] Dietician  [] Other:    Wound care instructions: If you smoke we ask that you refrain from smoking. Smoking inhibits wounds from healing. When taking antibiotics take the entire prescription as ordered. Do not stop taking until medication is all gone unless otherwise instructed. Exercise as tolerated. Keep weight off wounds and reposition every 2 hours if applicable. Do not get wounds wet in the bath or shower unless otherwise instructed by your physician. If your wound is on your foot or leg, you may purchase a cast bag. Please ask at the pharmacy. Wash hands with soap and water prior to and after every dressing change. [x]Wash wounds with: 0.9% normal saline  [x]Kiah wound Topical Treatments: Do not apply lotions, creams, or ointments to the skin around the wound bed unless directed as followed:   Apply around the wound: Nothing         [x]Wound Location: right lower leg   Apply Primary Dressing to wound: Alginate with silver pad  Secondary Dressing: 4X4 gauze pad   Avoid contact of tape with skin if possible. When to change Dressing: DO NOT CHANGE    [x] Multilayer Compression Wrap:  Type: Applied on Right lower leg(s)  4 Layer Compression Wrap    Do not get leg(s) with wrap wet. If wraps become too tight call the center or completely remove the wrap. Elevate leg(s) above the level of the heart when sitting. Avoid prolonged standing in one place. Applied in Clinic on 8/15/2022  The Goal of this therapy is to reduce edema and get into long term compression garments to control venous insufficiency, lymphedema and reduce occurrence of venous ulcers    [x] Edema Control:  Apply: Compression Stocking on Left     Elevate leg(s) above the level of the heart for 30 minutes 4-5 times a day and/or when sitting. Avoid prolonged standing in one place.     [x] Lymphedema Therapy:  Wear Lymphedema pumps twice a day at settings prescribed by your physician. Avoid prolonged standing in one place. Dietary:  Important dietary reminders:  1. Increase Protein intake (i.e. Lean meats, fish, eggs, legumes, and yogurt)  2. No added salt  3. If diabetic, follow a diabetic diet and check glucose prior to meals or as instructed by your physician. Dietary Supplements(Take twice a day unless instructed otherwise):  [] Lucien Labs  [] 30ml ProStat [] Angie Maliha [] Ensure Max/Premier [] Other:    Your nurse  is:  Jacquelin Rose     Electronically signed by Radha Ignacio RN on 8/15/2022 at 10:30 AM     [address] Information: Should you experience any significant changes in your wound(s) or have questions about your wound care, please contact the [address] [place] [address] at [phone]. We are open from 8:00am - 4:30p Monday, Thursday and Friday; 11:00am - 5pm on Tuesday and CLOSED Wednesday. Please give us 24-48 business hours to return your call. Call your doctor now or seek immediate medical care if:    You have symptoms of infection, such as: Increased pain, swelling, warmth, or redness. Red streaks leading from the area. Pus draining from the area. A fever.          [] Patient unable to sign Discharge Instructions given to ECF/Transportation/POA         Electronically signed by Delroy Ocasio MD on 8/15/2022 at 12:57 PM

## 2022-08-15 NOTE — DISCHARGE INSTRUCTIONS
[address] [facility] Physician Orders and Discharge Instructions  [address] [place] [address] [address] [facility]  Telephone: [phone]  NAME:  Kaycee Akhtar  YOB: 1965  MEDICAL RECORD NUMBER:  [id number]  DATE: 8/15/22     Return Appointment:  Return Appointment: With Dr. Prema Martinez  in  1 [facility])  [x] Return Appointment for a Wound Assessment with the nurse on: 08/19/22    Future Appointments   Date Time Provider Eloina Zamarripa   8/24/2022 10:00 AM [address]: none  Medically necessary services: [] Skilled Nursing [] PT (Eval & Treat) [] OT (Eval & Treat) [] Social Work [] Dietician  [] Other:    Wound care instructions: If you smoke we ask that you refrain from smoking. Smoking inhibits wounds from healing. When taking antibiotics take the entire prescription as ordered. Do not stop taking until medication is all gone unless otherwise instructed. Exercise as tolerated. Keep weight off wounds and reposition every 2 hours if applicable. Do not get wounds wet in the bath or shower unless otherwise instructed by your physician. If your wound is on your foot or leg, you may purchase a cast bag. Please ask at the pharmacy. Wash hands with soap and water prior to and after every dressing change. [x]Wash wounds with: 0.9% normal saline  [x]Kiah wound Topical Treatments: Do not apply lotions, creams, or ointments to the skin around the wound bed unless directed as followed:   Apply around the wound: Nothing         [x]Wound Location: right lower leg   Apply Primary Dressing to wound: Alginate with silver pad  Secondary Dressing: 4X4 gauze pad   Avoid contact of tape with skin if possible. When to change Dressing: DO NOT CHANGE    [x] Multilayer Compression Wrap:  Type: Applied on Right lower leg(s)  4 Layer Compression Wrap    Do not get leg(s) with wrap wet.   If wraps become too tight call the center or completely remove the wrap. Elevate leg(s) above the level of the heart when sitting. Avoid prolonged standing in one place. Applied in Clinic on 8/15/2022  The Goal of this therapy is to reduce edema and get into long term compression garments to control venous insufficiency, lymphedema and reduce occurrence of venous ulcers    [x] Edema Control:  Apply: Compression Stocking on Left     Elevate leg(s) above the level of the heart for 30 minutes 4-5 times a day and/or when sitting. Avoid prolonged standing in one place. [x] Lymphedema Therapy:  Wear Lymphedema pumps twice a day at settings prescribed by your physician. Avoid prolonged standing in one place. Dietary:  Important dietary reminders:  1. Increase Protein intake (i.e. Lean meats, fish, eggs, legumes, and yogurt)  2. No added salt  3. If diabetic, follow a diabetic diet and check glucose prior to meals or as instructed by your physician. Dietary Supplements(Take twice a day unless instructed otherwise):  [] Jacquelene Reins  [] 30ml ProStat [] Edgar Gallardo [] Ensure Max/Premier [] Other:    Your nurse  is:  Jacquelin Rose     Electronically signed by Kianna Smith RN on 8/15/2022 at 10:30 AM     [address] [facility] [address] Information: Should you experience any significant changes in your wound(s) or have questions about your wound care, please contact the [address] [place] [address] at [phone]. We are open from 8:00am - 4:30p Monday, Thursday and Friday; 11:00am - 5pm on Tuesday and CLOSED Wednesday. Please give us 24-48 business hours to return your call. Call your doctor now or seek immediate medical care if:    You have symptoms of infection, such as: Increased pain, swelling, warmth, or redness. Red streaks leading from the area. Pus draining from the area. A fever.          [] Patient unable to sign Discharge Instructions given to ECF/Transportation/POA

## 2022-08-15 NOTE — PROGRESS NOTES
Multilayer Compression Wrap   (Not Unna) Below the Knee    NAME:  Gigi Oswald  YOB: 1965  MEDICAL RECORD NUMBER:  [id number]  DATE:  8/15/2022       Removed old Multilayer wrap if present and washed leg with mild soap/water. Applied moisturizing agent to dry skin as needed. Applied primary and secondary dressing as ordered    Applied multilayered dressing below the knee to Right lower leg(s)  (4 Layer Compression Wrap ) . Instructed patient/caregiver not to remove dressing and to keep it clean and dry. Instructed patient/caregiver on complications to report to provider, such as pain, numbness in toes, heavy drainage, and slippage of dressing. Instructed patient on purpose of compression dressing and on activity and exercise recommendations.    Applied per   Guidelines    Electronically signed by Ivette Mills RN on 8/15/2022 at 10:40 AM

## 2022-08-19 ENCOUNTER — HOSPITAL ENCOUNTER (OUTPATIENT)
Dept: WOUND CARE | Age: 57
Discharge: HOME | End: 2022-08-19
Payer: MEDICAID

## 2022-08-19 VITALS
HEART RATE: 87 BPM | DIASTOLIC BLOOD PRESSURE: 83 MMHG | TEMPERATURE: 97.4 F | SYSTOLIC BLOOD PRESSURE: 142 MMHG | RESPIRATION RATE: 16 BRPM

## 2022-08-19 DIAGNOSIS — I89.0 CHRONIC ACQUIRED LYMPHEDEMA: Primary | ICD-10-CM

## 2022-08-19 DIAGNOSIS — Z91.199 NONCOMPLIANCE: ICD-10-CM

## 2022-08-19 DIAGNOSIS — L97.919 IDIOPATHIC CHRONIC VENOUS HYPERTENSION OF RIGHT LOWER EXTREMITY WITH ULCER (HCC): ICD-10-CM

## 2022-08-19 DIAGNOSIS — I87.311 IDIOPATHIC CHRONIC VENOUS HYPERTENSION OF RIGHT LOWER EXTREMITY WITH ULCER (HCC): ICD-10-CM

## 2022-08-19 LAB
ANAEROBIC CULTURE: ABNORMAL
ANAEROBIC CULTURE: ABNORMAL
GRAM STAIN RESULT: ABNORMAL
ORGANISM: ABNORMAL
WOUND/ABSCESS: ABNORMAL

## 2022-08-19 PROCEDURE — 29581 APPL MULTLAYER CMPRN SYS LEG: CPT

## 2022-08-19 RX ORDER — DOXYCYCLINE HYCLATE 100 MG
100 TABLET ORAL 2 TIMES DAILY
Qty: 60 TABLET | Refills: 0 | Status: SHIPPED | OUTPATIENT
Start: 2022-08-19 | End: 2022-09-18

## 2022-08-19 ASSESSMENT — PAIN DESCRIPTION - ORIENTATION: ORIENTATION: RIGHT

## 2022-08-19 ASSESSMENT — PAIN DESCRIPTION - FREQUENCY: FREQUENCY: CONTINUOUS

## 2022-08-19 ASSESSMENT — PAIN DESCRIPTION - ONSET: ONSET: ON-GOING

## 2022-08-19 ASSESSMENT — PAIN DESCRIPTION - PAIN TYPE: TYPE: CHRONIC PAIN

## 2022-08-19 ASSESSMENT — PAIN DESCRIPTION - DESCRIPTORS: DESCRIPTORS: BURNING

## 2022-08-19 ASSESSMENT — PAIN SCALES - GENERAL: PAINLEVEL_OUTOF10: 7

## 2022-08-19 ASSESSMENT — PAIN DESCRIPTION - LOCATION: LOCATION: LEG

## 2022-08-19 NOTE — PROGRESS NOTES
Multilayer Compression Wrap   (Not Unna) Below the Knee    NAME:  Lenny Jaime  YOB: 1965  MEDICAL RECORD NUMBER:  [id number]  DATE:  8/19/2022       Removed old Multilayer wrap if present and washed leg with mild soap/water. Applied moisturizing agent to dry skin as needed. Applied primary and secondary dressing as ordered    Applied multilayered dressing below the knee to Right lower leg(s)  (4 Layer Compression Wrap ) . Instructed patient/caregiver not to remove dressing and to keep it clean and dry. Instructed patient/caregiver on complications to report to provider, such as pain, numbness in toes, heavy drainage, and slippage of dressing. Instructed patient on purpose of compression dressing and on activity and exercise recommendations.    Applied per   Guidelines    Electronically signed by Jarrod Tejeda RN on 8/19/2022 at 11:25 AM

## 2022-08-19 NOTE — DISCHARGE INSTRUCTIONS
[address] [place] [address] Physician Orders and Discharge Instructions  [address] [address] [place] [address], [address] [facility]  Telephone: [phone]    NAME:  Surendra Newsome:  1965  MEDICAL RECORD NUMBER:  [id number]  DATE:  8/19/2022      Wound care:  Continue to follow the instructions and recommendations from your last doctor visit. The dressing(s) applied is the same as your last visit. Please refer to your last discharge instruction for the information on your wound care. If there were any changes made, please follow the instructions as written here:     Future Appointments     Future Appointments   Date Time Provider Eloina Zamarripa   8/22/2022  9:30 AM Tawanna Lynch MD [address]   8/24/2022 10:00 AM Mike NARAYAN           Your nurse  is:  Mauricio Loyd     Electronically signed by Jarrod Tejeda RN on 8/19/2022 at 11:26 AM     [address] [place] [address] Information: Should you experience any significant changes in your wound(s) or have questions about your wound care, please contact the [address] [place] [address] at [phone]. Our hours vary so please leave a message. Please give us 24-48 hours to return your call. If you need help with your wounds and cannot wait until we are available, contact your PCP or go to the hospital emergency room. Physician orders by:  Dr. Benny Liu        The information contained in the After Visit Summary has been reviewed with me, the patient and/or responsible adult, by my health care provider(s). I had the opportunity to ask questions regarding this information. I have elected to receive;      [] Patient unable to sign Discharge Instructions.  Given to ECF/Transportation/POA

## 2022-08-22 ENCOUNTER — HOSPITAL ENCOUNTER (OUTPATIENT)
Dept: WOUND CARE | Age: 57
Discharge: HOME | End: 2022-08-22
Payer: MEDICAID

## 2022-08-22 VITALS
TEMPERATURE: 97.4 F | RESPIRATION RATE: 16 BRPM | SYSTOLIC BLOOD PRESSURE: 126 MMHG | DIASTOLIC BLOOD PRESSURE: 75 MMHG | HEART RATE: 87 BPM

## 2022-08-22 DIAGNOSIS — L97.919 IDIOPATHIC CHRONIC VENOUS HYPERTENSION OF RIGHT LOWER EXTREMITY WITH ULCER (HCC): ICD-10-CM

## 2022-08-22 DIAGNOSIS — I89.0 CHRONIC ACQUIRED LYMPHEDEMA: Primary | ICD-10-CM

## 2022-08-22 DIAGNOSIS — Z91.199 NONCOMPLIANCE: ICD-10-CM

## 2022-08-22 DIAGNOSIS — I87.311 IDIOPATHIC CHRONIC VENOUS HYPERTENSION OF RIGHT LOWER EXTREMITY WITH ULCER (HCC): ICD-10-CM

## 2022-08-22 PROCEDURE — 29581 APPL MULTLAYER CMPRN SYS LEG: CPT

## 2022-08-22 PROCEDURE — 11043 DBRDMT MUSC&/FSCA 1ST 20/<: CPT | Performed by: SPECIALIST

## 2022-08-22 PROCEDURE — 11042 DBRDMT SUBQ TIS 1ST 20SQCM/<: CPT

## 2022-08-22 PROCEDURE — 11046 DBRDMT MUSC&/FSCA EA ADDL: CPT

## 2022-08-22 PROCEDURE — 6370000000 HC RX 637 (ALT 250 FOR IP): Performed by: SPECIALIST

## 2022-08-22 PROCEDURE — 11045 DBRDMT SUBQ TISS EACH ADDL: CPT | Performed by: SPECIALIST

## 2022-08-22 PROCEDURE — 11042 DBRDMT SUBQ TIS 1ST 20SQCM/<: CPT | Performed by: SPECIALIST

## 2022-08-22 RX ORDER — LIDOCAINE HYDROCHLORIDE 40 MG/ML
SOLUTION TOPICAL ONCE
Status: CANCELLED | OUTPATIENT
Start: 2022-08-22 | End: 2022-08-22

## 2022-08-22 RX ORDER — GENTAMICIN SULFATE 1 MG/G
OINTMENT TOPICAL ONCE
Status: CANCELLED | OUTPATIENT
Start: 2022-08-22 | End: 2022-08-22

## 2022-08-22 RX ORDER — LIDOCAINE 40 MG/G
CREAM TOPICAL ONCE
Status: CANCELLED | OUTPATIENT
Start: 2022-08-22 | End: 2022-08-22

## 2022-08-22 RX ORDER — LIDOCAINE HYDROCHLORIDE 20 MG/ML
JELLY TOPICAL ONCE
Status: CANCELLED | OUTPATIENT
Start: 2022-08-22 | End: 2022-08-22

## 2022-08-22 RX ORDER — BACITRACIN, NEOMYCIN, POLYMYXIN B 400; 3.5; 5 [USP'U]/G; MG/G; [USP'U]/G
OINTMENT TOPICAL ONCE
Status: CANCELLED | OUTPATIENT
Start: 2022-08-22 | End: 2022-08-22

## 2022-08-22 RX ORDER — GENTAMICIN SULFATE 1 MG/G
OINTMENT TOPICAL ONCE
Status: COMPLETED | OUTPATIENT
Start: 2022-08-22 | End: 2022-08-22

## 2022-08-22 RX ORDER — GINSENG 100 MG
CAPSULE ORAL ONCE
Status: CANCELLED | OUTPATIENT
Start: 2022-08-22 | End: 2022-08-22

## 2022-08-22 RX ORDER — LIDOCAINE HYDROCHLORIDE 40 MG/ML
SOLUTION TOPICAL ONCE
Status: COMPLETED | OUTPATIENT
Start: 2022-08-22 | End: 2022-08-22

## 2022-08-22 RX ORDER — BACITRACIN ZINC AND POLYMYXIN B SULFATE 500; 1000 [USP'U]/G; [USP'U]/G
OINTMENT TOPICAL ONCE
Status: CANCELLED | OUTPATIENT
Start: 2022-08-22 | End: 2022-08-22

## 2022-08-22 RX ORDER — BETAMETHASONE DIPROPIONATE 0.05 %
OINTMENT (GRAM) TOPICAL ONCE
Status: CANCELLED | OUTPATIENT
Start: 2022-08-22 | End: 2022-08-22

## 2022-08-22 RX ORDER — LIDOCAINE 50 MG/G
OINTMENT TOPICAL ONCE
Status: CANCELLED | OUTPATIENT
Start: 2022-08-22 | End: 2022-08-22

## 2022-08-22 RX ORDER — CLOBETASOL PROPIONATE 0.5 MG/G
OINTMENT TOPICAL ONCE
Status: CANCELLED | OUTPATIENT
Start: 2022-08-22 | End: 2022-08-22

## 2022-08-22 RX ADMIN — LIDOCAINE HYDROCHLORIDE: 40 SOLUTION TOPICAL at 09:58

## 2022-08-22 RX ADMIN — GENTAMICIN SULFATE: 1 OINTMENT TOPICAL at 11:17

## 2022-08-22 ASSESSMENT — PAIN DESCRIPTION - FREQUENCY: FREQUENCY: CONTINUOUS

## 2022-08-22 ASSESSMENT — PAIN SCALES - GENERAL: PAINLEVEL_OUTOF10: 8

## 2022-08-22 ASSESSMENT — PAIN DESCRIPTION - LOCATION: LOCATION: LEG

## 2022-08-22 ASSESSMENT — PAIN DESCRIPTION - PAIN TYPE: TYPE: CHRONIC PAIN

## 2022-08-22 ASSESSMENT — PAIN DESCRIPTION - ORIENTATION: ORIENTATION: RIGHT

## 2022-08-22 ASSESSMENT — PAIN DESCRIPTION - DESCRIPTORS: DESCRIPTORS: BURNING

## 2022-08-22 NOTE — PROGRESS NOTES
Multilayer Compression Wrap   (Not Unna) Below the Knee    NAME:  Lidia Thakkar  YOB: 1965  MEDICAL RECORD NUMBER:  [id number]  DATE:  8/22/2022       Removed old Multilayer wrap if present and washed leg with mild soap/water. Applied moisturizing agent to dry skin as needed. Applied primary and secondary dressing as ordered    Applied multilayered dressing below the knee to Right lower leg(s)  (4 Layer Compression Wrap ) . Instructed patient/caregiver not to remove dressing and to keep it clean and dry. Instructed patient/caregiver on complications to report to provider, such as pain, numbness in toes, heavy drainage, and slippage of dressing. Instructed patient on purpose of compression dressing and on activity and exercise recommendations.    Applied per   Guidelines    Electronically signed by Sarthak Valadez RN on 8/22/2022 at 11:18 AM

## 2022-08-22 NOTE — PROGRESS NOTES
No    Drug use: No       ALLERGIES    No Known Allergies    MEDICATIONS    Current Outpatient Medications on File Prior to Encounter   Medication Sig Dispense Refill    doxycycline hyclate (VIBRA-TABS) 100 MG tablet Take 1 tablet by mouth 2 times daily 60 tablet 0    ibuprofen (ADVIL;MOTRIN) 200 MG tablet Take 200 mg by mouth every 6 hours as needed for Pain      triamcinolone (KENALOG) 0.1 % ointment Apply topically 2 times daily (Patient not taking: No sig reported) 453.6 g 1    warfarin (COUMADIN) 5 MG tablet TAKE ONE AND ONE-HALF TABLETS BY MOUTH DAILY EXCEPT TAKE 2 TABLETS DAILY ON FRIDAY 180 tablet 1    Compression Stockings MISC by Does not apply route Strength 30-40mmHg  Style: Other pull up compression stockings  Extremity: bilateral  Donning aid recommended: No 1 each 0     No current facility-administered medications on file prior to encounter. REVIEW OF SYSTEMS  Review of Systems    Pertinent items are noted in HPI. Objective:      /75   Pulse 87   Temp 97.4 °F (36.3 °C) (Temporal)   Resp 16     Wt Readings from Last 3 Encounters:   04/25/22 251 lb 6.4 oz (114 kg)   04/18/22 251 lb 6.4 oz (114 kg)   10/27/21 244 lb (110.7 kg)       PHYSICAL EXAM    Extremities: no cyanosis, no clubbing, 3 + nonpitting edema-  right lower extremity, 4 full-thickness wounds containing fibrin slough minimal granulation tissue involving medial, lateral, posterior right knee with dermal fibrosis and hyperpigmentation    Assessment:      1. Chronic acquired lymphedema    2. Idiopathic chronic venous hypertension of right lower extremity with ulcer ([address]    3. Noncompliance         Procedure Note  Indications:  Based on my examination of this patient's wound(s) today, sharp excision is required to promote healing and evaluate the extent healing. Performed by: Robert Oneal MD    Consent obtained?  Yes    Time out taken: Yes    Pain Control: Anesthetic: 4% Lidocaine Liquid Topical Debridement:Excisional Debridement    Using curette the wound was sharply debrided    down through and including the removal of  epidermis, dermis, and subcutaneous tissue. Devitalized Tissue Debrided:  fibrin, biofilm, and slough      Pre Debridement Measurements:  Are located in the Wound Documentation Flow Sheet   Wound #: 1, 2, 3, and 4     Post  Debridement Measurements:  Wound 07/25/22 Leg Right; Lower;Medial #1 (Active)   Wound Image   07/25/22 1040   Wound Etiology Venous 07/25/22 1040   Wound Cleansed Cleansed with saline 08/22/22 0954   Dressing/Treatment Alginate with Ag 08/22/22 1116   Wound Length (cm) 3.9 cm 08/22/22 0954   Wound Width (cm) 4.6 cm 08/22/22 0954   Wound Depth (cm) 0.1 cm 08/22/22 0954   Wound Surface Area (cm^2) 17.94 cm^2 08/22/22 0954   Change in Wound Size % (l*w) 24.14 08/22/22 0954   Wound Volume (cm^3) 1.794 cm^3 08/22/22 0954   Wound Healing % 24 08/22/22 0954   Post-Procedure Length (cm) 4 cm 08/22/22 1037   Post-Procedure Width (cm) 4.7 cm 08/22/22 1037   Post-Procedure Depth (cm) 0.3 cm 08/22/22 1037   Post-Procedure Surface Area (cm^2) 18.8 cm^2 08/22/22 1037   Post-Procedure Volume (cm^3) 5.64 cm^3 08/22/22 1037   Distance Tunneling (cm) 0 cm 08/22/22 0954   Undermining Maxium Distance (cm) 0 08/22/22 0954   Wound Assessment Fibrin;Purple/maroon 08/22/22 0954   Drainage Amount Moderate 08/22/22 0954   Drainage Description Brown 08/22/22 0954   Odor Moderate 08/22/22 0954   Kiah-wound Assessment Intact 08/22/22 0954   Margins Defined edges 08/22/22 0954   Wound Thickness Description not for Pressure Injury Full thickness 08/22/22 0954   Number of days: 28       Wound 07/25/22 Leg Right; Anterior; Lower #2 (Active)   Wound Image   07/25/22 1040   Wound Etiology Venous 07/25/22 1040   Wound Cleansed Cleansed with saline 08/22/22 0954   Dressing/Treatment Pharmaceutical agent (see MAR) 08/22/22 1116   Wound Length (cm) 2 cm 08/22/22 0954   Wound Width (cm) 6 cm 08/22/22 0954 Wound Depth (cm) 0.1 cm 08/22/22 0954   Wound Surface Area (cm^2) 12 cm^2 08/22/22 0954   Change in Wound Size % (l*w) 73.68 08/22/22 0954   Wound Volume (cm^3) 1.2 cm^3 08/22/22 0954   Wound Healing % 74 08/22/22 0954   Post-Procedure Length (cm) 2.1 cm 08/22/22 1037   Post-Procedure Width (cm) 6.1 cm 08/22/22 1037   Post-Procedure Depth (cm) 0.3 cm 08/22/22 1037   Post-Procedure Surface Area (cm^2) 12.81 cm^2 08/22/22 1037   Post-Procedure Volume (cm^3) 3.843 cm^3 08/22/22 1037   Distance Tunneling (cm) 0 cm 08/22/22 0954   Undermining Maxium Distance (cm) 0 08/22/22 0954   Wound Assessment [place]/red;Slough 08/22/22 0954   Drainage Amount Moderate 08/22/22 0954   Drainage Description Brown;Green 08/22/22 0954   Odor Moderate 08/22/22 0954   Kiah-wound Assessment Intact 08/22/22 0954   Margins Unattached edges 08/22/22 0954   Wound Thickness Description not for Pressure Injury Full thickness 08/22/22 0954   Number of days: 28       Wound 07/25/22 Leg Right;Posterior; Lower #3 (Active)   Wound Image   07/25/22 1040   Wound Etiology Venous 07/25/22 1040   Wound Cleansed Cleansed with saline 08/22/22 0954   Dressing/Treatment Alginate with Ag 08/22/22 1116   Wound Length (cm) 5.8 cm 08/22/22 0954   Wound Width (cm) 6.5 cm 08/22/22 0954   Wound Depth (cm) 0.1 cm 08/22/22 0954   Wound Surface Area (cm^2) 37.7 cm^2 08/22/22 0954   Change in Wound Size % (l*w) 14.32 08/22/22 0954   Wound Volume (cm^3) 3.77 cm^3 08/22/22 0954   Wound Healing % 14 08/22/22 0954   Post-Procedure Length (cm) 5.9 cm 08/22/22 1037   Post-Procedure Width (cm) 6.6 cm 08/22/22 1037   Post-Procedure Depth (cm) 0.3 cm 08/22/22 1037   Post-Procedure Surface Area (cm^2) 38.94 cm^2 08/22/22 1037   Post-Procedure Volume (cm^3) 11.682 cm^3 08/22/22 1037   Distance Tunneling (cm) 0 cm 08/22/22 0954   Undermining Maxium Distance (cm) 0 08/22/22 0954   Wound Assessment Fibrin;Purple/maroon 08/22/22 0954   Drainage Amount Moderate 08/22/22 0954   Drainage Description Brown;Green 08/22/22 0954   Odor Moderate 08/22/22 0954   Kiah-wound Assessment Intact 08/22/22 0954   Margins Defined edges 08/22/22 0954   Wound Thickness Description not for Pressure Injury Full thickness 08/22/22 0954   Number of days: 28       Wound 07/25/22 Leg Right; Lower; Lateral #4 (Active)   Wound Image   08/22/22 1116   Wound Etiology Venous 08/22/22 1037   Wound Cleansed Cleansed with saline 08/22/22 1037   Dressing/Treatment Alginate with Ag 08/22/22 1116   Wound Length (cm) 3 cm 08/22/22 1037   Wound Width (cm) 2.5 cm 08/22/22 1037   Wound Depth (cm) 0.1 cm 08/22/22 1037   Wound Surface Area (cm^2) 7.5 cm^2 08/22/22 1037   Wound Volume (cm^3) 0.75 cm^3 08/22/22 1037   Post-Procedure Length (cm) 3.1 cm 08/22/22 1044   Post-Procedure Width (cm) 2.6 cm 08/22/22 1044   Post-Procedure Depth (cm) 0.3 cm 08/22/22 1044   Post-Procedure Surface Area (cm^2) 8.06 cm^2 08/22/22 1044   Post-Procedure Volume (cm^3) 2.418 cm^3 08/22/22 1044   Distance Tunneling (cm) 0 cm 08/22/22 1037   Tunneling Position ___ O'Clock 0 08/22/22 1037   Undermining Starts ___ O'Clock 0 08/22/22 1037   Undermining Ends___ O'Clock 0 08/22/22 1037   Undermining Maxium Distance (cm) 0 08/22/22 1037   Wound Assessment Slough;Reed/red 08/22/22 1037   Drainage Amount Moderate 08/22/22 1037   Drainage Description Brown 08/22/22 1037   Odor None 08/22/22 1037   Kiah-wound Assessment Intact 08/22/22 1037   Margins Defined edges 08/22/22 1037   Wound Thickness Description not for Pressure Injury Full thickness 08/22/22 1037   Number of days: 28          Percent of Wound Debrided: 100%    Total Surface Area Debrided:  76 sq cm    Diabetic/Pressure/Non Pressure Ulcers only:  Ulcer: Non-Pressure ulcer, fat layer exposed    Bleeding: Minimal    Hemostasis Achieved: by pressure    Procedural Pain: 0  / 10     Post Procedural Pain: 0 / 10     Response to treatment:  Well tolerated by patient.   Encouraged to use lymphedema pumps        Plan: Treatment Note: Please see attached Discharge Instructions. These instructions were given and signed by the patient or POA    New Prescriptions    No medications on file       Orders Placed This Encounter   Procedures    Initiate Outpatient Wound Care Protocol       Discharge Instructions          [facility] Physician Orders and Discharge Instructions  [address] [address] [address]  Telephone: [phone]  NAME:  Sunni Pradhan  YOB: 1965  MEDICAL RECORD NUMBER:  [id number]  DATE: 8/22/22     Return Appointment:  Return Appointment: With Dr. Sudha Yoder  in  [facility])  [x] Return Appointment for a Wound Assessment with the nurse on: 08/26/22    Future Appointments   Date Time Provider Eloina Zamarripa   8/24/2022 10:00 AM 6 Christine Nguyened: none  Medically necessary services: [] Skilled Nursing [] PT (Eval & Treat) [] OT (Eval & Treat) [] Social Work [] Dietician  [] Other:    Wound care instructions: If you smoke we ask that you refrain from smoking. Smoking inhibits wounds from healing. When taking antibiotics take the entire prescription as ordered. Do not stop taking until medication is all gone unless otherwise instructed. Exercise as tolerated. Keep weight off wounds and reposition every 2 hours if applicable. Do not get wounds wet in the bath or shower unless otherwise instructed by your physician. If your wound is on your foot or leg, you may purchase a cast bag. Please ask at the pharmacy. Wash hands with soap and water prior to and after every dressing change. [x]Wash wounds with: Vashe wash - Apply enough Vashe to soak a piece of gauze and place on wound bed for 5-10 minutes. No need to rinse after soaking.   [x]Kiah wound Topical Treatments: Do not apply lotions, creams, or ointments to the skin around the wound bed unless directed as followed: Apply around the wound: Nothing         [x]Wound Location: right anterior wound   Apply Primary Dressing to wound: Pharmaceutical medication: gentamycin- thin layer  Secondary Dressing: 4X4 gauze pad   Avoid contact of tape with skin if possible. When to change Dressing: DO NOT CHANGE    [x]Wound Location: right lower lateral, medial and posterior wounds   Apply Primary Dressing to wound: Alginate with silver pad  Secondary Dressing: 4X4 gauze pad   Avoid contact of tape with skin if possible. When to change Dressing: DO NOT CHANGE    [x] Multilayer Compression Wrap:  Type: 4 Layer Compression Wrap- right    Do not get leg(s) with wrap wet. If wraps become too tight call the center or completely remove the wrap. Elevate leg(s) above the level of the heart when sitting. Avoid prolonged standing in one place. Applied in Clinic on 8/22/2022  The Goal of this therapy is to reduce edema and get into long term compression garments to control venous insufficiency, lymphedema and reduce occurrence of venous ulcers    [x] Edema Control:  Apply: Compression Stocking on Left     Elevate leg(s) above the level of the heart for 30 minutes 4-5 times a day and/or when sitting. Avoid prolonged standing in one place. [x] Lymphedema Therapy:  Wear Lymphedema pumps twice a day at settings prescribed by your physician. Avoid prolonged standing in one place. Dietary:  Important dietary reminders:  1. Increase Protein intake (i.e. Lean meats, fish, eggs, legumes, and yogurt)  2. No added salt  3. If diabetic, follow a diabetic diet and check glucose prior to meals or as instructed by your physician.     Dietary Supplements(Take twice a day unless instructed otherwise):  [] [place]  [] 30ml ProStat [] Gordy Sour [] Ensure Max/Premier [] Other:    Your nurse  is:  Sheree Patel     Electronically signed by Citlalli Wood RN on 8/22/2022 at 10:47 AM     [address]: Should you experience any significant changes in your wound(s) or have questions about your wound care, please contact the [address] [place] [address] at [phone]. We are open from 8:00am - 4:30p Monday, Thursday and Friday; 11:00am - 5pm on Tuesday and CLOSED Wednesday. Please give us 24-48 business hours to return your call. Call your doctor now or seek immediate medical care if:    You have symptoms of infection, such as: Increased pain, swelling, warmth, or redness. Red streaks leading from the area. Pus draining from the area. A fever.          [] Patient unable to sign Discharge Instructions given to ECF/Transportation/POA         Electronically signed by Lashanda Sevilla MD on 8/22/2022 at 11:55 AM

## 2022-08-23 ENCOUNTER — TELEPHONE (OUTPATIENT)
Dept: FAMILY MEDICINE CLINIC | Age: 57
End: 2022-08-23

## 2022-08-23 ENCOUNTER — COMMUNITY OUTREACH (OUTPATIENT)
Dept: OTHER | Age: 57
End: 2022-08-23

## 2022-08-23 NOTE — TELEPHONE ENCOUNTER
Pt is having cough and chest pain due to COVID. Pt refuses to go to hospital, pt would like a prescription for symptoms.

## 2022-08-23 NOTE — PROGRESS NOTES
Pt called JOSE LUIS regarding a charge for a July date of service at the wound care clinic in the amount over $4200. Pt was wondering why the bill is so high given his eligibility for financial assistance. JOSE LUIS called [employer] to inquire. SW was informed that Pt's insurance was not billed for these charges. SW verified with Pt that he had insurance at the time of service. JOSE LUIS called [employer] and spoke to Jaida Carrera. She has made the appropriate adjustments to have Pt's insurance billed for those charges. Pt made aware.

## 2022-08-23 NOTE — TELEPHONE ENCOUNTER
I would definitely recommend patient to be evaluated further at the emergency room/urgent care for further testing and making sure he does not have any complications from [place].

## 2022-08-24 ENCOUNTER — TELEPHONE (OUTPATIENT)
Dept: PHARMACY | Age: 57
End: 2022-08-24

## 2022-08-24 NOTE — TELEPHONE ENCOUNTER
Patient no showed to anticoagulation clinic today. Called patient and LVM to reschedule INR check ASAP. Per telephone note from yesterday appears patient has COVID-19. He called complaining of chest pain and cough. They recommended he go to the ER but appears he was refusing. Will follow-up with patient when he returns my phone call.      Kalpana Johnson, PharmD, [facility]  [facility]  [place]: [phone]  [place]: [phone]  8/24/2022 10:58 AM

## 2022-08-26 NOTE — TELEPHONE ENCOUNTER
LVM #2 to r/s INR check ASAP.      Kan Schafer, PharmD, BCPS  [facility]ication [facility]  [place]: [phone]  [place]: [phone]  8/26/2022 3:25 PM

## 2022-08-29 ENCOUNTER — HOSPITAL ENCOUNTER (OUTPATIENT)
Dept: WOUND CARE | Age: 57
Discharge: HOME | End: 2022-08-29
Payer: MEDICAID

## 2022-08-29 VITALS
DIASTOLIC BLOOD PRESSURE: 83 MMHG | HEART RATE: 68 BPM | RESPIRATION RATE: 16 BRPM | SYSTOLIC BLOOD PRESSURE: 131 MMHG | TEMPERATURE: 97.1 F

## 2022-08-29 DIAGNOSIS — L97.919 IDIOPATHIC CHRONIC VENOUS HYPERTENSION OF RIGHT LOWER EXTREMITY WITH ULCER (HCC): ICD-10-CM

## 2022-08-29 DIAGNOSIS — I87.311 IDIOPATHIC CHRONIC VENOUS HYPERTENSION OF RIGHT LOWER EXTREMITY WITH ULCER (HCC): ICD-10-CM

## 2022-08-29 DIAGNOSIS — Z91.199 NONCOMPLIANCE: ICD-10-CM

## 2022-08-29 DIAGNOSIS — I89.0 CHRONIC ACQUIRED LYMPHEDEMA: Primary | ICD-10-CM

## 2022-08-29 PROCEDURE — 11042 DBRDMT SUBQ TIS 1ST 20SQCM/<: CPT | Performed by: SPECIALIST

## 2022-08-29 PROCEDURE — 11045 DBRDMT SUBQ TISS EACH ADDL: CPT | Performed by: SPECIALIST

## 2022-08-29 PROCEDURE — 6370000000 HC RX 637 (ALT 250 FOR IP): Performed by: SPECIALIST

## 2022-08-29 PROCEDURE — 11045 DBRDMT SUBQ TISS EACH ADDL: CPT

## 2022-08-29 PROCEDURE — 11042 DBRDMT SUBQ TIS 1ST 20SQCM/<: CPT

## 2022-08-29 PROCEDURE — 29581 APPL MULTLAYER CMPRN SYS LEG: CPT

## 2022-08-29 RX ORDER — BETAMETHASONE DIPROPIONATE 0.05 %
OINTMENT (GRAM) TOPICAL ONCE
Status: CANCELLED | OUTPATIENT
Start: 2022-08-29 | End: 2022-08-29

## 2022-08-29 RX ORDER — BACITRACIN, NEOMYCIN, POLYMYXIN B 400; 3.5; 5 [USP'U]/G; MG/G; [USP'U]/G
OINTMENT TOPICAL ONCE
Status: CANCELLED | OUTPATIENT
Start: 2022-08-29 | End: 2022-08-29

## 2022-08-29 RX ORDER — CLOBETASOL PROPIONATE 0.5 MG/G
OINTMENT TOPICAL ONCE
Status: CANCELLED | OUTPATIENT
Start: 2022-08-29 | End: 2022-08-29

## 2022-08-29 RX ORDER — LIDOCAINE HYDROCHLORIDE 20 MG/ML
JELLY TOPICAL ONCE
Status: CANCELLED | OUTPATIENT
Start: 2022-08-29 | End: 2022-08-29

## 2022-08-29 RX ORDER — LIDOCAINE HYDROCHLORIDE 40 MG/ML
SOLUTION TOPICAL ONCE
Status: COMPLETED | OUTPATIENT
Start: 2022-08-29 | End: 2022-08-29

## 2022-08-29 RX ORDER — GINSENG 100 MG
CAPSULE ORAL ONCE
Status: CANCELLED | OUTPATIENT
Start: 2022-08-29 | End: 2022-08-29

## 2022-08-29 RX ORDER — GENTAMICIN SULFATE 1 MG/G
OINTMENT TOPICAL ONCE
Status: CANCELLED | OUTPATIENT
Start: 2022-08-29 | End: 2022-08-29

## 2022-08-29 RX ORDER — LIDOCAINE HYDROCHLORIDE 40 MG/ML
SOLUTION TOPICAL ONCE
Status: CANCELLED | OUTPATIENT
Start: 2022-08-29 | End: 2022-08-29

## 2022-08-29 RX ORDER — LIDOCAINE 40 MG/G
CREAM TOPICAL ONCE
Status: CANCELLED | OUTPATIENT
Start: 2022-08-29 | End: 2022-08-29

## 2022-08-29 RX ORDER — LIDOCAINE 50 MG/G
OINTMENT TOPICAL ONCE
Status: CANCELLED | OUTPATIENT
Start: 2022-08-29 | End: 2022-08-29

## 2022-08-29 RX ORDER — BACITRACIN ZINC AND POLYMYXIN B SULFATE 500; 1000 [USP'U]/G; [USP'U]/G
OINTMENT TOPICAL ONCE
Status: CANCELLED | OUTPATIENT
Start: 2022-08-29 | End: 2022-08-29

## 2022-08-29 RX ADMIN — LIDOCAINE HYDROCHLORIDE: 40 SOLUTION TOPICAL at 09:42

## 2022-08-29 ASSESSMENT — PAIN DESCRIPTION - LOCATION: LOCATION: LEG

## 2022-08-29 ASSESSMENT — PAIN SCALES - GENERAL: PAINLEVEL_OUTOF10: 4

## 2022-08-29 ASSESSMENT — PAIN DESCRIPTION - ORIENTATION: ORIENTATION: RIGHT

## 2022-08-29 ASSESSMENT — PAIN DESCRIPTION - DESCRIPTORS: DESCRIPTORS: BURNING

## 2022-08-29 NOTE — DISCHARGE INSTRUCTIONS
[address] [facility] Physician Orders and Discharge Instructions  [address] [address] [address]  Telephone: [phone]  NAME:  Tavo Edgar  YOB: 1965  MEDICAL RECORD NUMBER:  [id number]  DATE: 8/29/22     Return Appointment:  Return Appointment: With Dr. Pepito Connell  in  [facility])  [x] Return Appointment for a Wound Assessment with the nurse on: 09/02/22    No future appointments. [address]: none  Medically necessary services: [] Skilled Nursing [] PT (Eval & Treat) [] OT (Eval & Treat) [] Social Work [] Dietician  [] Other:    Wound care instructions: If you smoke we ask that you refrain from smoking. Smoking inhibits wounds from healing. When taking antibiotics take the entire prescription as ordered. Do not stop taking until medication is all gone unless otherwise instructed. Exercise as tolerated. Keep weight off wounds and reposition every 2 hours if applicable. Do not get wounds wet in the bath or shower unless otherwise instructed by your physician. If your wound is on your foot or leg, you may purchase a cast bag. Please ask at the pharmacy. Wash hands with soap and water prior to and after every dressing change. [x]Wash wounds with: Vashe wash - Apply enough Vashe to soak a piece of gauze and place on wound bed for 5-10 minutes. No need to rinse after soaking. [x]Kiah wound Topical Treatments: Do not apply lotions, creams, or ointments to the skin around the wound bed unless directed as followed:   Apply around the wound: Nothing         [x]Wound Location: right  lower leg- medial    Apply Primary Dressing to wound: Pharmaceutical medication: gentamycin ointment  Secondary Dressing: 4X4 gauze pad   Avoid contact of tape with skin if possible.           [x]Wound Location: right  lower leg- anterior, lateral, and posterior   Apply Primary Dressing to wound: silver alginate  Secondary Dressing: 4X4 gauze pad   Avoid contact of tape with skin if possible. [x] Multilayer Compression Wrap:  Type: Applied on Right lower leg(s)  4 Layer Compression Wrap    Do not get leg(s) with wrap wet. If wraps become too tight call the center or completely remove the wrap. Elevate leg(s) above the level of the heart when sitting. Avoid prolonged standing in one place. Applied in Clinic on 8/29/2022  The Goal of this therapy is to reduce edema and get into long term compression garments to control venous insufficiency, lymphedema and reduce occurrence of venous ulcers    [x] Edema Control:  Apply: Compression Stocking on Left  - 30-40mmHG   Elevate leg(s) above the level of the heart for 30 minutes 4-5 times a day and/or when sitting. Avoid prolonged standing in one place. [x] Lymphedema Therapy:  Wear Lymphedema pumps twice a day at settings prescribed by your physician. Avoid prolonged standing in one place. Dietary:  Important dietary reminders:  1. Increase Protein intake (i.e. Lean meats, fish, eggs, legumes, and yogurt)  2. No added salt  3. If diabetic, follow a diabetic diet and check glucose prior to meals or as instructed by your physician. Dietary Supplements(Take twice a day unless instructed otherwise):  [] Madonna Laz  [] 30ml ProStat [] Yana Bar [] Ensure Max/Premier [] Other:    Your nurse  is:  Jacquelin Rose     Electronically signed by Jason Etienne RN on 8/29/2022 at 10:19 AM     [address]: Should you experience any significant changes in your wound(s) or have questions about your wound care, please contact the [address] [place] [address] at [phone]. We are open from 8:00am - 4:30p Monday, Thursday and Friday; 11:00am - 5pm on Tuesday and CLOSED Wednesday. Please give us 24-48 business hours to return your call. Call your doctor now or seek immediate medical care if:    You have symptoms of infection, such as:   Increased pain, swelling, warmth, or redness. Red streaks leading from the area. Pus draining from the area. A fever.          [] Patient unable to sign Discharge Instructions given to ECF/Transportation/POA

## 2022-08-30 NOTE — PROGRESS NOTES
[facility]  Progress Note and Procedure Note      Amparo Guillen  MEDICAL RECORD NUMBER:  [id number]  AGE: 62 y.o. GENDER: male  : 1965  EPISODE DATE:  2022    Subjective:     Chief Complaint   Patient presents with    Wound Check     Right lower leg         HISTORY of PRESENT ILLNESS HPI     Amparo Guillen is a 62 y.o. male who presents today for wound/ulcer evaluation. History of Wound Context: Well-known to the wound care center having been treated for years for chronic venous insufficiency with open wounds and significant lymphedema. He has been lost to follow-up for almost 1 year. He returns today with multiple open wounds of the right lower extremity with massive edema. He states he has been trying to wrap his legs at home. He states he was recently placed on antibiotics from the podiatry clinic for his wounds. He has been maintained on his Coumadin for chronic DVT.   He claims to intermittently been using his lymphedema pumps  Wound/Ulcer Pain Timing/Severity: none  Quality of pain: N/A  Severity:  0 / 10   Modifying Factors: None  Associated Signs/Symptoms: edema, drainage, and odor    Ulcer Identification:  Ulcer Type: venous    Contributing Factors: edema, venous stasis, lymphedema, obesity, and anticoagulation therapy    Acute Wound: N/A not an acute wound    PAST MEDICAL HISTORY        Diagnosis Date    DVT (deep venous thrombosis) (HCC)     Hx of blood clots     Pain and swelling of right lower leg        PAST SURGICAL HISTORY    Past Surgical History:   Procedure Laterality Date    BALLOON ANGIOPLASTY, ARTERY Right 2017    at [facility], ESOPHAGUS      SKIN SPLIT GRAFT Right        FAMILY HISTORY    Family History   Problem Relation Age of Onset    Diabetes Mother     Heart Attack Father        SOCIAL HISTORY    Social History     Tobacco Use    Smoking status: Never    Smokeless tobacco: Never   Vaping Use    Vaping Use: Never used Substance Use Topics    Alcohol use: No    Drug use: No       ALLERGIES    No Known Allergies    MEDICATIONS    Current Outpatient Medications on File Prior to Encounter   Medication Sig Dispense Refill    doxycycline hyclate (VIBRA-TABS) 100 MG tablet Take 1 tablet by mouth 2 times daily 60 tablet 0    ibuprofen (ADVIL;MOTRIN) 200 MG tablet Take 200 mg by mouth every 6 hours as needed for Pain      triamcinolone (KENALOG) 0.1 % ointment Apply topically 2 times daily (Patient not taking: No sig reported) 453.6 g 1    warfarin (COUMADIN) 5 MG tablet TAKE ONE AND ONE-HALF TABLETS BY MOUTH DAILY EXCEPT TAKE 2 TABLETS DAILY ON FRIDAY 180 tablet 1    Compression Stockings MISC by Does not apply route Strength 30-40mmHg  Style: Other pull up compression stockings  Extremity: bilateral  Donning aid recommended: No 1 each 0     No current facility-administered medications on file prior to encounter. REVIEW OF SYSTEMS  Review of Systems    Pertinent items are noted in HPI. Objective:      /83   Pulse 68   Temp 97.1 °F (36.2 °C) (Temporal)   Resp 16     Wt Readings from Last 3 Encounters:   04/25/22 251 lb 6.4 oz (114 kg)   04/18/22 251 lb 6.4 oz (114 kg)   10/27/21 244 lb (110.7 kg)       PHYSICAL EXAM    Extremities: no cyanosis, no clubbing, 3 + nonpitting edema-  right lower extremity, and 4 full-thickness wounds containing fibrin, slough, granulation tissue involving medial, lateral, posterior right knee with dermal fibrosis and hyperpigmentation. See wound measurements below    Assessment:      1. Chronic acquired lymphedema    2. Idiopathic chronic venous hypertension of right lower extremity with ulcer ([facility] [address]    3. Noncompliance         Procedure Note  Indications:  Based on my examination of this patient's wound(s) today, sharp excision is required to promote healing and evaluate the extent healing. Performed by: Can Mckeon MD    Consent obtained?  Yes    Time out taken: Yes    Pain Control: Anesthetic: 4% Lidocaine Liquid Topical     Debridement:Excisional Debridement    Using curette the wound was sharply debrided    down through and including the removal of  epidermis, dermis, and subcutaneous tissue. Devitalized Tissue Debrided:  fibrin, biofilm, and slough      Pre Debridement Measurements:  Are located in the Wound Documentation Flow Sheet   Wound #: 1, 2, 3, and 4     Post  Debridement Measurements:  Wound 07/25/22 Leg Right; Lower;Medial #1 (Active)   Wound Image   07/25/22 1040   Wound Etiology Venous 07/25/22 1040   Wound Cleansed Cleansed with saline 08/29/22 0942   Dressing/Treatment Alginate with Ag 08/22/22 1116   Wound Length (cm) 3.5 cm 08/29/22 0942   Wound Width (cm) 3.8 cm 08/29/22 0942   Wound Depth (cm) 0.1 cm 08/29/22 0942   Wound Surface Area (cm^2) 13.3 cm^2 08/29/22 0942   Change in Wound Size % (l*w) 43.76 08/29/22 0942   Wound Volume (cm^3) 1.33 cm^3 08/29/22 0942   Wound Healing % 44 08/29/22 0942   Post-Procedure Length (cm) 3.6 cm 08/29/22 1014   Post-Procedure Width (cm) 3.9 cm 08/29/22 1014   Post-Procedure Depth (cm) 0.3 cm 08/29/22 1014   Post-Procedure Surface Area (cm^2) 14.04 cm^2 08/29/22 1014   Post-Procedure Volume (cm^3) 4.212 cm^3 08/29/22 1014   Distance Tunneling (cm) 0 cm 08/22/22 0954   Undermining Maxium Distance (cm) 0 08/22/22 0954   Wound Assessment Purple/maroon;Slough 08/29/22 0942   Drainage Amount Moderate 08/29/22 0942   Drainage Description Purulent;Serosanguinous 08/29/22 0942   Odor Moderate 08/29/22 0942   Kiah-wound Assessment Dry/flaky 08/29/22 0942   Margins Defined edges 08/29/22 0942   Wound Thickness Description not for Pressure Injury Full thickness 08/29/22 0942   Number of days: 35       Wound 07/25/22 Leg Right; Anterior; Lower #2 (Active)   Wound Image   07/25/22 1040   Wound Etiology Venous 07/25/22 1040   Wound Cleansed Cleansed with saline 08/29/22 0942   Dressing/Treatment Pharmaceutical agent (see MAR) 08/22/22 1113 Wound Length (cm) 2.1 cm 08/29/22 1014   Wound Width (cm) 5.6 cm 08/29/22 1014   Wound Depth (cm) 0.3 cm 08/29/22 1014   Wound Surface Area (cm^2) 11.76 cm^2 08/29/22 1014   Change in Wound Size % (l*w) 74.21 08/29/22 1014   Wound Volume (cm^3) 3.528 cm^3 08/29/22 1014   Wound Healing % 23 08/29/22 1014   Post-Procedure Length (cm) 2.1 cm 08/22/22 1037   Post-Procedure Width (cm) 6.1 cm 08/22/22 1037   Post-Procedure Depth (cm) 0.3 cm 08/22/22 1037   Post-Procedure Surface Area (cm^2) 12.81 cm^2 08/22/22 1037   Post-Procedure Volume (cm^3) 3.843 cm^3 08/22/22 1037   Distance Tunneling (cm) 0 cm 08/22/22 0954   Undermining Maxium Distance (cm) 0 08/22/22 0954   Wound Assessment Slough 08/29/22 0942   Drainage Amount Moderate 08/29/22 0942   Drainage Description Purulent;Serosanguinous 08/29/22 0942   Odor Moderate 08/29/22 0942   Kiah-wound Assessment Intact 08/29/22 0942   Margins Defined edges 08/29/22 0942   Wound Thickness Description not for Pressure Injury Full thickness 08/29/22 0942   Number of days: 35       Wound 07/25/22 Leg Right;Posterior; Lower #3 (Active)   Wound Image   07/25/22 1040   Wound Etiology Venous 07/25/22 1040   Wound Cleansed Cleansed with saline 08/29/22 0942   Dressing/Treatment Alginate with Ag 08/22/22 1116   Wound Length (cm) 5.8 cm 08/29/22 0942   Wound Width (cm) 7.5 cm 08/29/22 0942   Wound Depth (cm) 0.1 cm 08/29/22 0942   Wound Surface Area (cm^2) 43.5 cm^2 08/29/22 0942   Change in Wound Size % (l*w) 1.14 08/29/22 0942   Wound Volume (cm^3) 4.35 cm^3 08/29/22 0942   Wound Healing % 1 08/29/22 0942   Post-Procedure Length (cm) 5.9 cm 08/29/22 1014   Post-Procedure Width (cm) 7.6 cm 08/29/22 1014   Post-Procedure Depth (cm) 0.3 cm 08/29/22 1014   Post-Procedure Surface Area (cm^2) 44.84 cm^2 08/29/22 1014   Post-Procedure Volume (cm^3) 13.452 cm^3 08/29/22 1014   Distance Tunneling (cm) 0 cm 08/22/22 0954   Undermining Maxium Distance (cm) 0 08/22/22 0954   Wound Assessment Slough; Purple/maroon 08/29/22 0942   Drainage Amount Moderate 08/29/22 0942   Drainage Description Purulent;Serosanguinous 08/29/22 0942   Odor Moderate 08/29/22 0942   Kiah-wound Assessment Dry/flaky 08/29/22 0942   Margins Defined edges 08/29/22 0942   Wound Thickness Description not for Pressure Injury Full thickness 08/29/22 0942   Number of days: 35       Wound 07/25/22 Leg Right; Lower; Lateral #4 (Active)   Wound Image   08/22/22 1116   Wound Etiology Venous 08/22/22 1037   Wound Cleansed Cleansed with saline 08/29/22 0942   Dressing/Treatment Alginate with Ag 08/22/22 1116   Wound Length (cm) 3 cm 08/29/22 0942   Wound Width (cm) 2.5 cm 08/29/22 0942   Wound Depth (cm) 0.1 cm 08/29/22 0942   Wound Surface Area (cm^2) 7.5 cm^2 08/29/22 0942   Change in Wound Size % (l*w) 0 08/29/22 0942   Wound Volume (cm^3) 0.75 cm^3 08/29/22 0942   Wound Healing % 0 08/29/22 0942   Post-Procedure Length (cm) 3.1 cm 08/29/22 1014   Post-Procedure Width (cm) 0.6 cm 08/29/22 1014   Post-Procedure Depth (cm) 0.3 cm 08/29/22 1014   Post-Procedure Surface Area (cm^2) 1.86 cm^2 08/29/22 1014   Post-Procedure Volume (cm^3) 0.558 cm^3 08/29/22 1014   Distance Tunneling (cm) 0 cm 08/22/22 1037   Tunneling Position ___ O'Clock 0 08/22/22 1037   Undermining Starts ___ O'Clock 0 08/22/22 1037   Undermining Ends___ O'Clock 0 08/22/22 1037   Undermining Maxium Distance (cm) 0 08/22/22 1037   Wound Assessment Slough;Long/red 08/29/22 0942   Drainage Amount Moderate 08/29/22 0942   Drainage Description Purulent;Serosanguinous 08/29/22 0942   Odor Mild 08/29/22 0942   Kiah-wound Assessment Dry/flaky 08/29/22 0942   Margins Defined edges 08/29/22 0942   Wound Thickness Description not for Pressure Injury Full thickness 08/29/22 0942   Number of days: 36          Percent of Wound Debrided: 100%    Total Surface Area Debrided:  71 sq cm    Diabetic/Pressure/Non Pressure Ulcers only:  Ulcer: Non-Pressure ulcer, fat layer exposed    Bleeding: Minimal    Hemostasis Achieved: by pressure    Procedural Pain: 0  / 10     Post Procedural Pain: 0 / 10     Response to treatment:  Well tolerated by patient. Plan:     Treatment Note: Please see attached Discharge Instructions. These instructions were given and signed by the patient or POA    New Prescriptions    No medications on file       No orders of the defined types were placed in this encounter. Discharge [facility] Physician Orders and Discharge Instructions  [address] [address] [address] [place] [address]. [facility]. [facility]  Telephone: [phone]  NAME:  Jarod Mo  YOB: 1965  MEDICAL RECORD NUMBER:  [id number]  DATE: 8/29/22     Return Appointment:  Return Appointment: With Dr. Amalia Subramanian  in  1 [facility])  [x] Return Appointment for a Wound Assessment with the nurse on: 09/02/22    No future appointments. [address]: none  Medically necessary services: [] Skilled Nursing [] PT (Eval & Treat) [] OT (Eval & Treat) [] Social Work [] Dietician  [] Other:    Wound care instructions: If you smoke we ask that you refrain from smoking. Smoking inhibits wounds from healing. When taking antibiotics take the entire prescription as ordered. Do not stop taking until medication is all gone unless otherwise instructed. Exercise as tolerated. Keep weight off wounds and reposition every 2 hours if applicable. Do not get wounds wet in the bath or shower unless otherwise instructed by your physician. If your wound is on your foot or leg, you may purchase a cast bag. Please ask at the pharmacy. Wash hands with soap and water prior to and after every dressing change. [x]Wash wounds with: Vashe wash - Apply enough Vashe to soak a piece of gauze and place on wound bed for 5-10 minutes. No need to rinse after soaking.   [x]Kiah wound Topical Treatments: Do not apply lotions, creams, or ointments to the skin around the wound bed unless directed as followed:   Apply around the wound: Nothing         [x]Wound Location: right  lower leg- medial    Apply Primary Dressing to wound: Pharmaceutical medication: gentamycin ointment  Secondary Dressing: 4X4 gauze pad   Avoid contact of tape with skin if possible. [x]Wound Location: right  lower leg- anterior, lateral, and posterior   Apply Primary Dressing to wound: silver alginate  Secondary Dressing: 4X4 gauze pad   Avoid contact of tape with skin if possible. [x] Multilayer Compression Wrap:  Type: Applied on Right lower leg(s)  4 Layer Compression Wrap    Do not get leg(s) with wrap wet. If wraps become too tight call the center or completely remove the wrap. Elevate leg(s) above the level of the heart when sitting. Avoid prolonged standing in one place. Applied in Clinic on 8/29/2022  The Goal of this therapy is to reduce edema and get into long term compression garments to control venous insufficiency, lymphedema and reduce occurrence of venous ulcers    [x] Edema Control:  Apply: Compression Stocking on Left  - 30-40mmHG   Elevate leg(s) above the level of the heart for 30 minutes 4-5 times a day and/or when sitting. Avoid prolonged standing in one place. [x] Lymphedema Therapy:  Wear Lymphedema pumps twice a day at settings prescribed by your physician. Avoid prolonged standing in one place. Dietary:  Important dietary reminders:  1. Increase Protein intake (i.e. Lean meats, fish, eggs, legumes, and yogurt)  2. No added salt  3. If diabetic, follow a diabetic diet and check glucose prior to meals or as instructed by your physician.     Dietary Supplements(Take twice a day unless instructed otherwise):  [] Ana Pramod  [] 30ml ProStat [] Efe Moose [] Ensure Max/Premier [] Other:    Your nurse  is:  Jacquelin Rose     Electronically signed by Jules Hidalgo RN on 8/29/2022 at 10:19 AM     [facility] [address]: Should you experience any significant changes in your wound(s) or have questions about your wound care, please contact the [address] [place] [address] at [phone]. We are open from 8:00am - 4:30p Monday, Thursday and Friday; 11:00am - 5pm on Tuesday and CLOSED Wednesday. Please give us 24-48 business hours to return your call. Call your doctor now or seek immediate medical care if:    You have symptoms of infection, such as: Increased pain, swelling, warmth, or redness. Red streaks leading from the area. Pus draining from the area. A fever.          [] Patient unable to sign Discharge Instructions given to ECF/Transportation/POA         Electronically signed by Teofilo Garcia MD on 8/30/2022 at 10:28 AM

## 2022-09-02 ENCOUNTER — HOSPITAL ENCOUNTER (OUTPATIENT)
Dept: WOUND CARE | Age: 57
Discharge: HOME | End: 2022-09-02
Payer: MEDICAID

## 2022-09-02 DIAGNOSIS — L97.919 IDIOPATHIC CHRONIC VENOUS HYPERTENSION OF RIGHT LOWER EXTREMITY WITH ULCER (HCC): ICD-10-CM

## 2022-09-02 DIAGNOSIS — Z91.199 NONCOMPLIANCE: ICD-10-CM

## 2022-09-02 DIAGNOSIS — I89.0 CHRONIC ACQUIRED LYMPHEDEMA: Primary | ICD-10-CM

## 2022-09-02 DIAGNOSIS — I87.311 IDIOPATHIC CHRONIC VENOUS HYPERTENSION OF RIGHT LOWER EXTREMITY WITH ULCER (HCC): ICD-10-CM

## 2022-09-02 PROCEDURE — 29581 APPL MULTLAYER CMPRN SYS LEG: CPT

## 2022-09-02 NOTE — PROGRESS NOTES
Multilayer Compression Wrap   (Not Unna) Below the Knee    NAME:  Patricia Clinton  YOB: 1965  MEDICAL RECORD NUMBER:  [id number]  DATE:  9/2/2022       Removed old Multilayer wrap if present and washed leg with mild soap/water. Applied moisturizing agent to dry skin as needed. Applied primary and secondary dressing as ordered    Applied multilayered dressing below the knee to Right lower leg(s)  (4 Layer Compression Wrap ) . Instructed patient/caregiver not to remove dressing and to keep it clean and dry. Instructed patient/caregiver on complications to report to provider, such as pain, numbness in toes, heavy drainage, and slippage of dressing. Instructed patient on purpose of compression dressing and on activity and exercise recommendations.    Applied per   Guidelines    Electronically signed by Petar Major RN on 9/2/2022 at 12:05 PM

## 2022-09-06 ENCOUNTER — HOSPITAL ENCOUNTER (OUTPATIENT)
Dept: WOUND CARE | Age: 57
Discharge: HOME | End: 2022-09-06
Payer: MEDICAID

## 2022-09-06 VITALS
HEART RATE: 71 BPM | SYSTOLIC BLOOD PRESSURE: 121 MMHG | DIASTOLIC BLOOD PRESSURE: 76 MMHG | TEMPERATURE: 97.3 F | RESPIRATION RATE: 18 BRPM

## 2022-09-06 DIAGNOSIS — I89.0 CHRONIC ACQUIRED LYMPHEDEMA: Primary | ICD-10-CM

## 2022-09-06 DIAGNOSIS — I87.311 IDIOPATHIC CHRONIC VENOUS HYPERTENSION OF RIGHT LOWER EXTREMITY WITH ULCER (HCC): ICD-10-CM

## 2022-09-06 DIAGNOSIS — L97.919 IDIOPATHIC CHRONIC VENOUS HYPERTENSION OF RIGHT LOWER EXTREMITY WITH ULCER (HCC): ICD-10-CM

## 2022-09-06 DIAGNOSIS — Z91.199 NONCOMPLIANCE: ICD-10-CM

## 2022-09-06 PROCEDURE — 6370000000 HC RX 637 (ALT 250 FOR IP): Performed by: SPECIALIST

## 2022-09-06 PROCEDURE — 11045 DBRDMT SUBQ TISS EACH ADDL: CPT | Performed by: SPECIALIST

## 2022-09-06 PROCEDURE — 11042 DBRDMT SUBQ TIS 1ST 20SQCM/<: CPT | Performed by: SPECIALIST

## 2022-09-06 PROCEDURE — 11045 DBRDMT SUBQ TISS EACH ADDL: CPT

## 2022-09-06 PROCEDURE — 11042 DBRDMT SUBQ TIS 1ST 20SQCM/<: CPT

## 2022-09-06 PROCEDURE — 29581 APPL MULTLAYER CMPRN SYS LEG: CPT

## 2022-09-06 RX ORDER — BACITRACIN, NEOMYCIN, POLYMYXIN B 400; 3.5; 5 [USP'U]/G; MG/G; [USP'U]/G
OINTMENT TOPICAL ONCE
Status: CANCELLED | OUTPATIENT
Start: 2022-09-06 | End: 2022-09-06

## 2022-09-06 RX ORDER — LIDOCAINE 50 MG/G
OINTMENT TOPICAL ONCE
Status: CANCELLED | OUTPATIENT
Start: 2022-09-06 | End: 2022-09-06

## 2022-09-06 RX ORDER — LIDOCAINE HYDROCHLORIDE 40 MG/ML
SOLUTION TOPICAL ONCE
Status: CANCELLED | OUTPATIENT
Start: 2022-09-06 | End: 2022-09-06

## 2022-09-06 RX ORDER — LIDOCAINE HYDROCHLORIDE 20 MG/ML
JELLY TOPICAL ONCE
Status: CANCELLED | OUTPATIENT
Start: 2022-09-06 | End: 2022-09-06

## 2022-09-06 RX ORDER — GENTAMICIN SULFATE 1 MG/G
OINTMENT TOPICAL ONCE
Status: CANCELLED | OUTPATIENT
Start: 2022-09-06 | End: 2022-09-06

## 2022-09-06 RX ORDER — BACITRACIN ZINC AND POLYMYXIN B SULFATE 500; 1000 [USP'U]/G; [USP'U]/G
OINTMENT TOPICAL ONCE
Status: CANCELLED | OUTPATIENT
Start: 2022-09-06 | End: 2022-09-06

## 2022-09-06 RX ORDER — BETAMETHASONE DIPROPIONATE 0.05 %
OINTMENT (GRAM) TOPICAL ONCE
Status: CANCELLED | OUTPATIENT
Start: 2022-09-06 | End: 2022-09-06

## 2022-09-06 RX ORDER — LIDOCAINE HYDROCHLORIDE 40 MG/ML
SOLUTION TOPICAL ONCE
Status: COMPLETED | OUTPATIENT
Start: 2022-09-06 | End: 2022-09-06

## 2022-09-06 RX ORDER — LIDOCAINE 40 MG/G
CREAM TOPICAL ONCE
Status: CANCELLED | OUTPATIENT
Start: 2022-09-06 | End: 2022-09-06

## 2022-09-06 RX ORDER — GINSENG 100 MG
CAPSULE ORAL ONCE
Status: CANCELLED | OUTPATIENT
Start: 2022-09-06 | End: 2022-09-06

## 2022-09-06 RX ORDER — CLOBETASOL PROPIONATE 0.5 MG/G
OINTMENT TOPICAL ONCE
Status: CANCELLED | OUTPATIENT
Start: 2022-09-06 | End: 2022-09-06

## 2022-09-06 RX ADMIN — LIDOCAINE HYDROCHLORIDE: 40 SOLUTION TOPICAL at 11:00

## 2022-09-06 ASSESSMENT — PAIN SCALES - GENERAL: PAINLEVEL_OUTOF10: 4

## 2022-09-06 ASSESSMENT — PAIN DESCRIPTION - FREQUENCY: FREQUENCY: CONTINUOUS

## 2022-09-06 ASSESSMENT — PAIN DESCRIPTION - DESCRIPTORS: DESCRIPTORS: BURNING

## 2022-09-06 ASSESSMENT — PAIN DESCRIPTION - PAIN TYPE: TYPE: CHRONIC PAIN

## 2022-09-06 ASSESSMENT — PAIN DESCRIPTION - ORIENTATION: ORIENTATION: RIGHT

## 2022-09-06 ASSESSMENT — PAIN DESCRIPTION - LOCATION: LOCATION: LEG

## 2022-09-06 NOTE — DISCHARGE INSTRUCTIONS
[facility] Physician Orders and Discharge Instructions  [address] [facility]   [address] [address] [facility]. [address]  Telephone: [phone]  NAME:  Amelie Madden  YOB: 1965  MEDICAL RECORD NUMBER:  [id number]  DATE: 9/6/22     Return Appointment:  Return Appointment: With Dr. Clarissa Hoffman  in  1 [facility])  [] Return Appointment for a Wound Assessment with the nurse on:     No future appointments. [address]: none  Medically necessary services: [] Skilled Nursing [] PT (Eval & Treat) [] OT (Eval & Treat) [] Social Work [] Dietician  [] Other:    Wound care instructions: If you smoke we ask that you refrain from smoking. Smoking inhibits wounds from healing. When taking antibiotics take the entire prescription as ordered. Do not stop taking until medication is all gone unless otherwise instructed. Exercise as tolerated. Keep weight off wounds and reposition every 2 hours if applicable. Do not get wounds wet in the bath or shower unless otherwise instructed by your physician. If your wound is on your foot or leg, you may purchase a cast bag. Please ask at the pharmacy. Wash hands with soap and water prior to and after every dressing change. [x]Wash wounds with: 0.9% normal saline  [x]Kiah wound Topical Treatments: Do not apply lotions, creams, or ointments to the skin around the wound bed unless directed as followed:   Apply around the wound: Nothing         [x]Wound Location: right lower leg   Apply Primary Dressing to wound:  polymem  Secondary Dressing: Extra absorbant pad   Avoid contact of tape with skin if possible. When to change Dressing: DO NOT CHANGE      [x] Multilayer Compression Wrap:  Type: 4 Layer Compression Wrap- right    Do not get leg(s) with wrap wet. If wraps become too tight call the center or completely remove the wrap. Elevate leg(s) above the level of the heart when sitting.   Avoid prolonged standing in one place. Applied in Clinic on 9/6/2022  The Goal of this therapy is to reduce edema and get into long term compression garments to control venous insufficiency, lymphedema and reduce occurrence of venous ulcers    [x] Edema Control:  Apply: Compression Stocking on Left     Elevate leg(s) above the level of the heart for 30 minutes 4-5 times a day and/or when sitting. Avoid prolonged standing in one place. [x] Lymphedema Therapy:  Wear Lymphedema pumps twice a day at settings prescribed by your physician. Avoid prolonged standing in one place. Dietary:  Important dietary reminders:  1. Increase Protein intake (i.e. Lean meats, fish, eggs, legumes, and yogurt)  2. No added salt  3. If diabetic, follow a diabetic diet and check glucose prior to meals or as instructed by your physician. Dietary Supplements(Take twice a day unless instructed otherwise):  [] Bobbette Lavon  [] 30ml ProStat [] Rickey Showers [] Ensure Max/Premier [] Other:    Your nurse  is:  Jacquelin Rose     Electronically signed by Ludmila Stoddard RN on 9/6/2022 at 12:19 PM     [address]: Should you experience any significant changes in your wound(s) or have questions about your wound care, please contact the [address] [place] [address] at [phone]. We are open from 8:00am - 4:30p Monday, Thursday and Friday; 11:00am - 5pm on Tuesday and CLOSED Wednesday. Please give us 24-48 business hours to return your call. Call your doctor now or seek immediate medical care if:    You have symptoms of infection, such as: Increased pain, swelling, warmth, or redness. Red streaks leading from the area. Pus draining from the area. A fever.          [] Patient unable to sign Discharge Instructions given to ECF/Transportation/POA

## 2022-09-06 NOTE — PROGRESS NOTES
[facility]  Progress Note and Procedure Note      Dileep Castillo  MEDICAL RECORD NUMBER:  [id number]  AGE: 62 y.o. GENDER: male  : 1965  EPISODE DATE:  2022    Subjective:     Chief Complaint   Patient presents with    Wound Check     Right lower leg         HISTORY of PRESENT ILLNESS HPI     Dileep Castillo is a 62 y.o. male who presents today for wound/ulcer evaluation. History of Wound Context: Well-known to the wound care center having been treated for years for chronic venous insufficiency with open wounds and significant lymphedema. He has been lost to follow-up for almost 1 year. He returns today with multiple open wounds of the right lower extremity with massive edema. He states he has been trying to wrap his legs at home. He states he was recently placed on antibiotics from the podiatry clinic for his wounds. He has been maintained on his Coumadin for chronic DVT.   He claims to intermittently been using his lymphedema pumps  Wound/Ulcer Pain Timing/Severity: none  Quality of pain: N/A  Severity:  0 / 10   Modifying Factors: None  Associated Signs/Symptoms: edema, drainage, and odor    Ulcer Identification:  Ulcer Type: venous    Contributing Factors: edema, venous stasis, lymphedema, obesity, and anticoagulation therapy    Acute Wound: N/A not an acute wound    PAST MEDICAL HISTORY        Diagnosis Date    DVT (deep venous thrombosis) (HCC)     Hx of blood clots     Pain and swelling of right lower leg        PAST SURGICAL HISTORY    Past Surgical History:   Procedure Laterality Date    BALLOON ANGIOPLASTY, ARTERY Right 2017    at [facility], ESOPHAGUS      SKIN SPLIT GRAFT Right        FAMILY HISTORY    Family History   Problem Relation Age of Onset    Diabetes Mother     Heart Attack Father        SOCIAL HISTORY    Social History     Tobacco Use    Smoking status: Never    Smokeless tobacco: Never   Vaping Use    Vaping Use: Never used Substance Use Topics    Alcohol use: No    Drug use: No       ALLERGIES    No Known Allergies    MEDICATIONS    Current Outpatient Medications on File Prior to Encounter   Medication Sig Dispense Refill    doxycycline hyclate (VIBRA-TABS) 100 MG tablet Take 1 tablet by mouth 2 times daily 60 tablet 0    ibuprofen (ADVIL;MOTRIN) 200 MG tablet Take 200 mg by mouth every 6 hours as needed for Pain      triamcinolone (KENALOG) 0.1 % ointment Apply topically 2 times daily (Patient not taking: No sig reported) 453.6 g 1    warfarin (COUMADIN) 5 MG tablet TAKE ONE AND ONE-HALF TABLETS BY MOUTH DAILY EXCEPT TAKE 2 TABLETS DAILY ON FRIDAY 180 tablet 1    Compression Stockings MISC by Does not apply route Strength 30-40mmHg  Style: Other pull up compression stockings  Extremity: bilateral  Donning aid recommended: No 1 each 0     No current facility-administered medications on file prior to encounter. REVIEW OF SYSTEMS  Review of Systems    Pertinent items are noted in HPI. Objective:      /76   Pulse 71   Temp 97.3 °F (36.3 °C) (Temporal)   Resp 18     Wt Readings from Last 3 Encounters:   04/25/22 251 lb 6.4 oz (114 kg)   04/18/22 251 lb 6.4 oz (114 kg)   10/27/21 244 lb (110.7 kg)       PHYSICAL EXAM    Extremities: no cyanosis, no clubbing, 3 + nonpitting edema-  right lower extremity, and 4 full-thickness wounds containing fibrin, slough, granulation tissue and minimal epithelialization at the margins involving right lower extremity medial lateral and posterior knee. Dermal fibrosis and hyperpigmentation with varicosities                  Assessment:      1. Chronic acquired lymphedema    2. Idiopathic chronic venous hypertension of right lower extremity with ulcer ([address]    3. Noncompliance         Procedure Note  Indications:  Based on my examination of this patient's wound(s) today, sharp excision is required to promote healing and evaluate the extent healing.     Performed by: Mu Silveira MD    Consent obtained? Yes    Time out taken: Yes    Pain Control: Anesthetic: 4% Lidocaine Liquid Topical     Debridement:Excisional Debridement    Using curette the wound was sharply debrided    down through and including the removal of  epidermis, dermis, and subcutaneous tissue. Devitalized Tissue Debrided:  fibrin, biofilm, and slough      Pre Debridement Measurements:  Are located in the Wound Documentation Flow Sheet   Wound #: 1, 2, 3, and 4     Post  Debridement Measurements:  Wound 07/25/22 Leg Right; Lower;Medial #1 (Active)   Wound Image   09/06/22 1104   Wound Etiology Venous 07/25/22 1040   Wound Cleansed Cleansed with saline 09/06/22 1224   Dressing/Treatment Other (comment) 09/06/22 1224   Wound Length (cm) 3.5 cm 09/06/22 1104   Wound Width (cm) 3.5 cm 09/06/22 1104   Wound Depth (cm) 0.1 cm 09/06/22 1104   Wound Surface Area (cm^2) 12.25 cm^2 09/06/22 1104   Change in Wound Size % (l*w) 48.2 09/06/22 1104   Wound Volume (cm^3) 1.225 cm^3 09/06/22 1104   Wound Healing % 48 09/06/22 1104   Post-Procedure Length (cm) 3.6 cm 09/06/22 1214   Post-Procedure Width (cm) 3.6 cm 09/06/22 1214   Post-Procedure Depth (cm) 0.3 cm 09/06/22 1214   Post-Procedure Surface Area (cm^2) 12.96 cm^2 09/06/22 1214   Post-Procedure Volume (cm^3) 3.888 cm^3 09/06/22 1214   Distance Tunneling (cm) 0 cm 08/22/22 0954   Undermining Maxium Distance (cm) 0 08/22/22 0954   Wound Assessment Toppers/red;Slough 09/06/22 1104   Drainage Amount Moderate 09/06/22 1104   Drainage Description Purulent;Serosanguinous 09/06/22 1104   Odor Mild 09/06/22 1104   Kiah-wound Assessment Intact 09/06/22 1104   Margins Defined edges 09/06/22 1104   Wound Thickness Description not for Pressure Injury Full thickness 09/06/22 1104   Number of days: 43       Wound 07/25/22 Leg Right; Anterior; Lower #2 (Active)   Wound Image   09/06/22 1104   Wound Etiology Venous 07/25/22 1040   Wound Cleansed Cleansed with saline 09/06/22 1224   Dressing/Treatment Other (comment) 09/06/22 1224   Wound Length (cm) 1 cm 09/06/22 1104   Wound Width (cm) 4.8 cm 09/06/22 1104   Wound Depth (cm) 0.1 cm 09/06/22 1104   Wound Surface Area (cm^2) 4.8 cm^2 09/06/22 1104   Change in Wound Size % (l*w) 89.47 09/06/22 1104   Wound Volume (cm^3) 0.48 cm^3 09/06/22 1104   Wound Healing % 89 09/06/22 1104   Post-Procedure Length (cm) 1.1 cm 09/06/22 1214   Post-Procedure Width (cm) 4.9 cm 09/06/22 1214   Post-Procedure Depth (cm) 0.3 cm 09/06/22 1214   Post-Procedure Surface Area (cm^2) 5.39 cm^2 09/06/22 1214   Post-Procedure Volume (cm^3) 1.617 cm^3 09/06/22 1214   Distance Tunneling (cm) 0 cm 09/02/22 1159   Undermining Maxium Distance (cm) 0 09/02/22 1159   Wound Assessment Granulation tissue;Pink/red 09/06/22 1104   Drainage Amount Small 09/06/22 1104   Drainage Description Serosanguinous 09/06/22 1104   Odor None 09/06/22 1104   Kiah-wound Assessment Intact;Dry/flaky 09/06/22 1104   Margins Defined edges 09/06/22 1104   Wound Thickness Description not for Pressure Injury Full thickness 09/06/22 1104   Number of days: 43       Wound 07/25/22 Leg Right;Posterior; Lower #3 (Active)   Wound Image   09/06/22 1104   Wound Etiology Venous 07/25/22 1040   Wound Cleansed Cleansed with saline 09/06/22 1224   Dressing/Treatment Other (comment) 09/06/22 1224   Wound Length (cm) 3.5 cm 09/06/22 1104   Wound Width (cm) 7.5 cm 09/06/22 1104   Wound Depth (cm) 0.1 cm 09/06/22 1104   Wound Surface Area (cm^2) 26.25 cm^2 09/06/22 1104   Change in Wound Size % (l*w) 40.34 09/06/22 1104   Wound Volume (cm^3) 2.625 cm^3 09/06/22 1104   Wound Healing % 40 09/06/22 1104   Post-Procedure Length (cm) 3.6 cm 09/06/22 1214   Post-Procedure Width (cm) 7.6 cm 09/06/22 1214   Post-Procedure Depth (cm) 0.3 cm 09/06/22 1214   Post-Procedure Surface Area (cm^2)  28cm^2 09/06/22 1214   Post-Procedure Volume (cm^3) 1.728 cm^3 09/06/22 1214   Distance Tunneling (cm) 0 cm 09/02/22 1159   Undermining Maxium Distance (cm) 0 09/02/22 1159   Wound Assessment Fibrin;Pink/red;Slough; Subcutaneous 09/06/22 1104   Drainage Amount Moderate 09/06/22 1104   Drainage Description Purulent;Serosanguinous 09/06/22 1104   Odor Mild 09/06/22 1104   Kiah-wound Assessment Erosion 09/06/22 1104   Margins Defined edges 09/06/22 1104   Wound Thickness Description not for Pressure Injury Full thickness 09/06/22 1104   Number of days: 43       Wound 07/25/22 Leg Right; Lower; Lateral #4 (Active)   Wound Image   09/06/22 1104   Wound Etiology Venous 08/22/22 1037   Wound Cleansed Cleansed with saline 09/06/22 1224   Dressing/Treatment Other (comment) 09/06/22 1224   Wound Length (cm) 1 cm 09/06/22 1104   Wound Width (cm) 1.2 cm 09/06/22 1104   Wound Depth (cm) 0.1 cm 09/06/22 1104   Wound Surface Area (cm^2) 1.2 cm^2 09/06/22 1104   Change in Wound Size % (l*w) 84 09/06/22 1104   Wound Volume (cm^3) 0.12 cm^3 09/06/22 1104   Wound Healing % 84 09/06/22 1104   Post-Procedure Length (cm) 1.1 cm 09/06/22 1214   Post-Procedure Width (cm) 1.3 cm 09/06/22 1214   Post-Procedure Depth (cm) 0.3 cm 09/06/22 1214   Post-Procedure Surface Area (cm^2) 1.43 cm^2 09/06/22 1214   Post-Procedure Volume (cm^3) 0.429 cm^3 09/06/22 1214   Distance Tunneling (cm) 0 cm 08/22/22 1037   Tunneling Position ___ O'Clock 0 08/22/22 1037   Undermining Starts ___ O'Clock 0 08/22/22 1037   Undermining Ends___ O'Clock 0 08/22/22 1037   Undermining Maxium Distance (cm) 0 08/22/22 1037   Wound Assessment Pink/red 09/06/22 1104   Drainage Amount Moderate 09/06/22 1104   Drainage Description Serosanguinous 09/06/22 1104   Odor None 09/06/22 1104   Kiah-wound Assessment Dry/flaky 09/06/22 1104   Margins Defined edges 09/06/22 1104   Wound Thickness Description not for Pressure Injury Full thickness 09/06/22 1104   Number of days: 43          Percent of Wound Debrided: 100%    Total Surface Area Debrided:  47 sq cm    Diabetic/Pressure/Non Pressure Ulcers only:  Ulcer: Non-Pressure ulcer, fat layer exposed    Bleeding: Minimal    Hemostasis Achieved: by pressure    Procedural Pain: 0  / 10     Post Procedural Pain: 0 / 10     Response to treatment:  Well tolerated by patient. Improvement with wound measurements        Plan:     Treatment Note: Please see attached Discharge Instructions. These instructions were given and signed by the patient or POA    New Prescriptions    No medications on file       Orders Placed This Encounter   Procedures    Initiate Outpatient Wound Care Protocol       Discharge Instructions          [facility] Physician Orders and Discharge Instructions  [address] [place] [address] [address] [address] [facility]. [place] [address]  Telephone: [phone]  NAME:  Shauna Meraz  YOB: 1965  MEDICAL RECORD NUMBER:  [id number]  DATE: 9/6/22     Return Appointment:  Return Appointment: With Dr. Gabriela Pennington  in  [address] [place] [address])  [] Return Appointment for a Wound Assessment with the nurse on:     No future appointments. [address]: none  Medically necessary services: [] Skilled Nursing [] PT (Eval & Treat) [] OT (Eval & Treat) [] Social Work [] Dietician  [] Other:    Wound care instructions: If you smoke we ask that you refrain from smoking. Smoking inhibits wounds from healing. When taking antibiotics take the entire prescription as ordered. Do not stop taking until medication is all gone unless otherwise instructed. Exercise as tolerated. Keep weight off wounds and reposition every 2 hours if applicable. Do not get wounds wet in the bath or shower unless otherwise instructed by your physician. If your wound is on your foot or leg, you may purchase a cast bag. Please ask at the pharmacy. Wash hands with soap and water prior to and after every dressing change.     [x]Wash wounds with: 0.9% normal saline  [x]Kiah wound Topical Treatments: Do not apply lotions, creams, or ointments to the skin around the wound bed unless directed as followed:   Apply around the wound: Nothing         [x]Wound Location: right lower leg   Apply Primary Dressing to wound:  polymem  Secondary Dressing: Extra absorbant pad   Avoid contact of tape with skin if possible. When to change Dressing: DO NOT CHANGE      [x] Multilayer Compression Wrap:  Type: 4 Layer Compression Wrap- right    Do not get leg(s) with wrap wet. If wraps become too tight call the center or completely remove the wrap. Elevate leg(s) above the level of the heart when sitting. Avoid prolonged standing in one place. Applied in Clinic on 9/6/2022  The Goal of this therapy is to reduce edema and get into long term compression garments to control venous insufficiency, lymphedema and reduce occurrence of venous ulcers    [x] Edema Control:  Apply: Compression Stocking on Left     Elevate leg(s) above the level of the heart for 30 minutes 4-5 times a day and/or when sitting. Avoid prolonged standing in one place. [x] Lymphedema Therapy:  Wear Lymphedema pumps twice a day at settings prescribed by your physician. Avoid prolonged standing in one place. Dietary:  Important dietary reminders:  1. Increase Protein intake (i.e. Lean meats, fish, eggs, legumes, and yogurt)  2. No added salt  3. If diabetic, follow a diabetic diet and check glucose prior to meals or as instructed by your physician. Dietary Supplements(Take twice a day unless instructed otherwise):  [] Grant Ohm  [] 30ml ProStat [] Lyndle Corpus [] Ensure Max/Premier [] Other:    Your nurse  is:  Jacquelin Rose     Electronically signed by Valeria Velazquez RN on 9/6/2022 at 12:19 PM     [address]: Should you experience any significant changes in your wound(s) or have questions about your wound care, please contact the [address] [place] [address] at [phone]. We are open from 8:00am - 4:30p Monday, Thursday and Friday; 11:00am - 5pm on Tuesday and CLOSED Wednesday.  Please give us 24-48 business hours to return your call. Call your doctor now or seek immediate medical care if:    You have symptoms of infection, such as: Increased pain, swelling, warmth, or redness. Red streaks leading from the area. Pus draining from the area. A fever.          [] Patient unable to sign Discharge Instructions given to ECF/Transportation/POA       Electronically signed by Chacho Amaral MD on 9/6/2022 at 12:59 PM

## 2022-09-06 NOTE — PROGRESS NOTES
Multilayer Compression Wrap   (Not Unna) Below the Knee    NAME:  Zach Crum  YOB: 1965  MEDICAL RECORD NUMBER:  [id number]  DATE:  9/6/2022       Removed old Multilayer wrap if present and washed leg with mild soap/water. Applied moisturizing agent to dry skin as needed. Applied primary and secondary dressing as ordered    Applied multilayered dressing below the knee to Right lower leg(s)  (4 Layer Compression Wrap ) . Instructed patient/caregiver not to remove dressing and to keep it clean and dry. Instructed patient/caregiver on complications to report to provider, such as pain, numbness in toes, heavy drainage, and slippage of dressing. Instructed patient on purpose of compression dressing and on activity and exercise recommendations.    Applied per   Guidelines    Electronically signed by Anna Solorzano RN on 9/6/2022 at 12:35 PM

## 2022-09-09 NOTE — TELEPHONE ENCOUNTER
LVM #3 to r/s INR check. Jose Hong.  Dahiana Butterfield, PharmD, BCPS  [facility] [place]  Ph: [phone]  9/9/2022 12:50 PM

## 2022-09-12 ENCOUNTER — HOSPITAL ENCOUNTER (OUTPATIENT)
Dept: WOUND CARE | Age: 57
Discharge: HOME | End: 2022-09-12
Payer: MEDICAID

## 2022-09-12 VITALS
TEMPERATURE: 97.6 F | SYSTOLIC BLOOD PRESSURE: 122 MMHG | RESPIRATION RATE: 16 BRPM | HEART RATE: 75 BPM | DIASTOLIC BLOOD PRESSURE: 83 MMHG

## 2022-09-12 DIAGNOSIS — L97.919 IDIOPATHIC CHRONIC VENOUS HYPERTENSION OF RIGHT LOWER EXTREMITY WITH ULCER (HCC): ICD-10-CM

## 2022-09-12 DIAGNOSIS — I87.311 IDIOPATHIC CHRONIC VENOUS HYPERTENSION OF RIGHT LOWER EXTREMITY WITH ULCER (HCC): ICD-10-CM

## 2022-09-12 DIAGNOSIS — Z91.199 NONCOMPLIANCE: ICD-10-CM

## 2022-09-12 DIAGNOSIS — I89.0 CHRONIC ACQUIRED LYMPHEDEMA: Primary | ICD-10-CM

## 2022-09-12 PROCEDURE — 29581 APPL MULTLAYER CMPRN SYS LEG: CPT

## 2022-09-12 PROCEDURE — 11042 DBRDMT SUBQ TIS 1ST 20SQCM/<: CPT | Performed by: SPECIALIST

## 2022-09-12 PROCEDURE — 11045 DBRDMT SUBQ TISS EACH ADDL: CPT

## 2022-09-12 PROCEDURE — 6370000000 HC RX 637 (ALT 250 FOR IP): Performed by: SPECIALIST

## 2022-09-12 PROCEDURE — 11042 DBRDMT SUBQ TIS 1ST 20SQCM/<: CPT

## 2022-09-12 PROCEDURE — 11045 DBRDMT SUBQ TISS EACH ADDL: CPT | Performed by: SPECIALIST

## 2022-09-12 RX ORDER — GINSENG 100 MG
CAPSULE ORAL ONCE
Status: CANCELLED | OUTPATIENT
Start: 2022-09-12 | End: 2022-09-12

## 2022-09-12 RX ORDER — LIDOCAINE HYDROCHLORIDE 20 MG/ML
JELLY TOPICAL ONCE
Status: CANCELLED | OUTPATIENT
Start: 2022-09-12 | End: 2022-09-12

## 2022-09-12 RX ORDER — LIDOCAINE HYDROCHLORIDE 40 MG/ML
SOLUTION TOPICAL ONCE
Status: COMPLETED | OUTPATIENT
Start: 2022-09-12 | End: 2022-09-12

## 2022-09-12 RX ORDER — GENTAMICIN SULFATE 1 MG/G
OINTMENT TOPICAL ONCE
Status: CANCELLED | OUTPATIENT
Start: 2022-09-12 | End: 2022-09-12

## 2022-09-12 RX ORDER — BETAMETHASONE DIPROPIONATE 0.05 %
OINTMENT (GRAM) TOPICAL ONCE
Status: CANCELLED | OUTPATIENT
Start: 2022-09-12 | End: 2022-09-12

## 2022-09-12 RX ORDER — LIDOCAINE 40 MG/G
CREAM TOPICAL ONCE
Status: CANCELLED | OUTPATIENT
Start: 2022-09-12 | End: 2022-09-12

## 2022-09-12 RX ORDER — BACITRACIN ZINC AND POLYMYXIN B SULFATE 500; 1000 [USP'U]/G; [USP'U]/G
OINTMENT TOPICAL ONCE
Status: CANCELLED | OUTPATIENT
Start: 2022-09-12 | End: 2022-09-12

## 2022-09-12 RX ORDER — CLOBETASOL PROPIONATE 0.5 MG/G
OINTMENT TOPICAL ONCE
Status: CANCELLED | OUTPATIENT
Start: 2022-09-12 | End: 2022-09-12

## 2022-09-12 RX ORDER — LIDOCAINE HYDROCHLORIDE 40 MG/ML
SOLUTION TOPICAL ONCE
Status: CANCELLED | OUTPATIENT
Start: 2022-09-12 | End: 2022-09-12

## 2022-09-12 RX ORDER — LIDOCAINE 50 MG/G
OINTMENT TOPICAL ONCE
Status: CANCELLED | OUTPATIENT
Start: 2022-09-12 | End: 2022-09-12

## 2022-09-12 RX ORDER — BACITRACIN, NEOMYCIN, POLYMYXIN B 400; 3.5; 5 [USP'U]/G; MG/G; [USP'U]/G
OINTMENT TOPICAL ONCE
Status: CANCELLED | OUTPATIENT
Start: 2022-09-12 | End: 2022-09-12

## 2022-09-12 RX ADMIN — LIDOCAINE HYDROCHLORIDE: 40 SOLUTION TOPICAL at 11:15

## 2022-09-12 ASSESSMENT — PAIN DESCRIPTION - ORIENTATION: ORIENTATION: RIGHT

## 2022-09-12 ASSESSMENT — PAIN SCALES - GENERAL: PAINLEVEL_OUTOF10: 3

## 2022-09-12 ASSESSMENT — PAIN - FUNCTIONAL ASSESSMENT: PAIN_FUNCTIONAL_ASSESSMENT: ACTIVITIES ARE NOT PREVENTED

## 2022-09-12 ASSESSMENT — PAIN DESCRIPTION - DESCRIPTORS: DESCRIPTORS: BURNING

## 2022-09-12 ASSESSMENT — PAIN DESCRIPTION - PAIN TYPE: TYPE: CHRONIC PAIN

## 2022-09-12 ASSESSMENT — PAIN DESCRIPTION - LOCATION: LOCATION: LEG

## 2022-09-12 NOTE — TELEPHONE ENCOUNTER
LVM #4. Sending NS letter #1.      Joseph Lazo, PharmD, BCPS  [facility]  [place]: [phone]  [facility]: [phone]  9/12/2022 3:07 PM

## 2022-09-12 NOTE — PROGRESS NOTES
[facility]  Progress Note and Procedure Note      Alex Foreman  MEDICAL RECORD NUMBER:  [id number]  AGE: 62 y.o. GENDER: male  : 1965  EPISODE DATE:  2022    Subjective:     Chief Complaint   Patient presents with    Wound Check     Right lower leg           HISTORY of PRESENT ILLNESS HPI     Alex Foreman is a 62 y.o. male who presents today for wound/ulcer evaluation. History of Wound Context: Well-known to the wound care center having been treated for years for chronic venous insufficiency with open wounds and significant lymphedema. He has been lost to follow-up for almost 1 year. He returns today with multiple open wounds of the right lower extremity with massive edema. He states he has been trying to wrap his legs at home. He states he was recently placed on antibiotics from the podiatry clinic for his wounds. He has been maintained on his Coumadin for chronic DVT. He claims to intermittently been using his lymphedema pumps.   Patient states drainage has been significantly less this past week  Wound/Ulcer Pain Timing/Severity: none  Quality of pain: N/A  Severity:  0 / 10   Modifying Factors: None  Associated Signs/Symptoms: edema and drainage    Ulcer Identification:  Ulcer Type: venous    Contributing Factors: edema, venous stasis, lymphedema, obesity, and anticoagulation therapy    Acute Wound: N/A not an acute wound    PAST MEDICAL HISTORY        Diagnosis Date    DVT (deep venous thrombosis) (HCC)     Hx of blood clots     Pain and swelling of right lower leg        PAST SURGICAL HISTORY    Past Surgical History:   Procedure Laterality Date    BALLOON ANGIOPLASTY, ARTERY Right 2017    at [facility], ESOPHAGUS      SKIN SPLIT GRAFT Right        FAMILY HISTORY    Family History   Problem Relation Age of Onset    Diabetes Mother     Heart Attack Father        SOCIAL HISTORY    Social History     Tobacco Use    Smoking status: Never Smokeless tobacco: Never   Vaping Use    Vaping Use: Never used   Substance Use Topics    Alcohol use: No    Drug use: No       ALLERGIES    No Known Allergies    MEDICATIONS    Current Outpatient Medications on File Prior to Encounter   Medication Sig Dispense Refill    doxycycline hyclate (VIBRA-TABS) 100 MG tablet Take 1 tablet by mouth 2 times daily 60 tablet 0    ibuprofen (ADVIL;MOTRIN) 200 MG tablet Take 200 mg by mouth every 6 hours as needed for Pain      triamcinolone (KENALOG) 0.1 % ointment Apply topically 2 times daily (Patient not taking: No sig reported) 453.6 g 1    warfarin (COUMADIN) 5 MG tablet TAKE ONE AND ONE-HALF TABLETS BY MOUTH DAILY EXCEPT TAKE 2 TABLETS DAILY ON FRIDAY 180 tablet 1    Compression Stockings MISC by Does not apply route Strength 30-40mmHg  Style: Other pull up compression stockings  Extremity: bilateral  Donning aid recommended: No 1 each 0     No current facility-administered medications on file prior to encounter. REVIEW OF SYSTEMS  Review of Systems    Pertinent items are noted in HPI. Objective:      /83   Pulse 75   Temp 97.6 °F (36.4 °C) (Temporal)   Resp 16     Wt Readings from Last 3 Encounters:   04/25/22 251 lb 6.4 oz (114 kg)   04/18/22 251 lb 6.4 oz (114 kg)   10/27/21 244 lb (110.7 kg)       PHYSICAL EXAM    Extremities[de-identified] no cyanosis, no clubbing, 3 + nonpitting edema-  right lower extremity, and 4 full-thickness wounds containing fibrin, slough, granulation tissue and minimal epithelialization at the margins involving right lower extremity medial lateral and posterior knee. Dermal fibrosis and hyperpigmentation with varicosities    Assessment:      1. Chronic acquired lymphedema    2. Idiopathic chronic venous hypertension of right lower extremity with ulcer ([facility] [address]    3.  Noncompliance         Procedure Note  Indications:  Based on my examination of this patient's wound(s) today, sharp excision is required to promote healing and evaluate the extent healing. Performed by: Radha Burden MD    Consent obtained? Yes    Time out taken: Yes    Pain Control: Anesthetic: 4% Lidocaine Liquid Topical     Debridement:Excisional Debridement    Using curette the wound was sharply debrided    down through and including the removal of  epidermis, dermis, and subcutaneous tissue. Devitalized Tissue Debrided:  fibrin, biofilm, and slough      Pre Debridement Measurements:  Are located in the Wound Documentation Flow Sheet   Wound #: 1, 2, 3, and 4     Post  Debridement Measurements:  Wound 07/25/22 Leg Right; Lower;Medial #1 (Active)   Wound Image   09/06/22 1104   Wound Etiology Venous 07/25/22 1040   Wound Cleansed Cleansed with saline 09/12/22 1059   Dressing/Treatment Other (comment) 09/12/22 1148   Wound Length (cm) 3 cm 09/12/22 1059   Wound Width (cm) 4.2 cm 09/12/22 1059   Wound Depth (cm) 0.1 cm 09/12/22 1059   Wound Surface Area (cm^2) 12.6 cm^2 09/12/22 1059   Change in Wound Size % (l*w) 46.72 09/12/22 1059   Wound Volume (cm^3) 1.26 cm^3 09/12/22 1059   Wound Healing % 47 09/12/22 1059   Post-Procedure Length (cm) 3.1 cm 09/12/22 1121   Post-Procedure Width (cm) 4.3 cm 09/12/22 1121   Post-Procedure Depth (cm) 0.3 cm 09/12/22 1121   Post-Procedure Surface Area (cm^2) 13.33 cm^2 09/12/22 1121   Post-Procedure Volume (cm^3) 3.999 cm^3 09/12/22 1121   Distance Tunneling (cm) 0 cm 09/12/22 1059   Undermining Maxium Distance (cm) 0 09/12/22 1059   Wound Assessment [place]/red;Slough 09/12/22 1059   Drainage Amount Moderate 09/12/22 1059   Drainage Description Serosanguinous 09/12/22 1059   Odor Mild 09/12/22 1059   Kiah-wound Assessment Intact 09/12/22 1059   Margins Defined edges 09/12/22 1059   Wound Thickness Description not for Pressure Injury Full thickness 09/12/22 1059   Number of days: 49       Wound 07/25/22 Leg Right; Anterior; Lower #2 (Active)   Wound Image   09/06/22 1104   Wound Etiology Venous 07/25/22 1040   Wound Cleansed Cleansed with saline 09/12/22 1059   Dressing/Treatment Other (comment) 09/12/22 1148   Wound Length (cm) 1 cm 09/12/22 1059   Wound Width (cm) 5.1 cm 09/12/22 1059   Wound Depth (cm) 0.1 cm 09/12/22 1059   Wound Surface Area (cm^2) 5.1 cm^2 09/12/22 1059   Change in Wound Size % (l*w) 88.82 09/12/22 1059   Wound Volume (cm^3) 0.51 cm^3 09/12/22 1059   Wound Healing % 89 09/12/22 1059   Post-Procedure Length (cm) 1.1 cm 09/12/22 1121   Post-Procedure Width (cm) 5.2 cm 09/12/22 1121   Post-Procedure Depth (cm) 0.3 cm 09/12/22 1121   Post-Procedure Surface Area (cm^2) 5.72 cm^2 09/12/22 1121   Post-Procedure Volume (cm^3) 1.716 cm^3 09/12/22 1121   Distance Tunneling (cm) 0 cm 09/12/22 1059   Undermining Maxium Distance (cm) 0 09/12/22 1059   Wound Assessment Fibrin;Pink/red 09/12/22 1059   Drainage Amount Moderate 09/12/22 1059   Drainage Description Serosanguinous 09/12/22 1059   Odor Mild 09/12/22 1059   Kiah-wound Assessment Intact; Maceration 09/12/22 1059   Margins Defined edges 09/12/22 1059   Wound Thickness Description not for Pressure Injury Full thickness 09/12/22 1059   Number of days: 49       Wound 07/25/22 Leg Right;Posterior; Lower #3 (Active)   Wound Image   09/06/22 1104   Wound Etiology Venous 07/25/22 1040   Wound Cleansed Cleansed with saline 09/12/22 1059   Dressing/Treatment Other (comment) 09/12/22 1148   Wound Length (cm) 3 cm 09/12/22 1059   Wound Width (cm) 7.7 cm 09/12/22 1059   Wound Depth (cm) 0.1 cm 09/12/22 1059   Wound Surface Area (cm^2) 23.1 cm^2 09/12/22 1059   Change in Wound Size % (l*w) 47.5 09/12/22 1059   Wound Volume (cm^3) 2.31 cm^3 09/12/22 1059   Wound Healing % 48 09/12/22 1059   Post-Procedure Length (cm) 3.1 cm 09/12/22 1121   Post-Procedure Width (cm) 7.8 cm 09/12/22 1121   Post-Procedure Depth (cm) 0.3 cm 09/12/22 1121   Post-Procedure Surface Area (cm^2) 24.18 cm^2 09/12/22 1121   Post-Procedure Volume (cm^3) 7. 254 cm^3 09/12/22 1121   Distance Tunneling (cm) 0 cm 09/12/22 1059 Undermining Maxium Distance (cm) 0 09/12/22 1059   Wound Assessment Fibrin;Pink/red 09/12/22 1059   Drainage Amount Moderate 09/12/22 1059   Drainage Description Serosanguinous 09/12/22 1059   Odor Mild 09/12/22 1059   Kiah-wound Assessment Intact; Maceration 09/12/22 1059   Margins Defined edges 09/12/22 1059   Wound Thickness Description not for Pressure Injury Full thickness 09/12/22 1059   Number of days: 49       Wound 07/25/22 Leg Right; Lower; Lateral #4 (Active)   Wound Image   09/06/22 1104   Wound Etiology Venous 08/22/22 1037   Wound Cleansed Cleansed with saline 09/12/22 1059   Dressing/Treatment Other (comment) 09/12/22 1148   Wound Length (cm) 1.4 cm 09/12/22 1059   Wound Width (cm) 1 cm 09/12/22 1059   Wound Depth (cm) 0.1 cm 09/12/22 1059   Wound Surface Area (cm^2) 1.4 cm^2 09/12/22 1059   Change in Wound Size % (l*w) 81.33 09/12/22 1059   Wound Volume (cm^3) 0.14 cm^3 09/12/22 1059   Wound Healing % 81 09/12/22 1059   Post-Procedure Length (cm) 1.5 cm 09/12/22 1121   Post-Procedure Width (cm) 1.1 cm 09/12/22 1121   Post-Procedure Depth (cm) 0.3 cm 09/12/22 1121   Post-Procedure Surface Area (cm^2) 1.65 cm^2 09/12/22 1121   Post-Procedure Volume (cm^3) 0.495 cm^3 09/12/22 1121   Distance Tunneling (cm) 0 cm 09/12/22 1059   Tunneling Position ___ O'Clock 0 08/22/22 1037   Undermining Starts ___ O'Clock 0 08/22/22 1037   Undermining Ends___ O'Clock 0 08/22/22 1037   Undermining Maxium Distance (cm) 0 09/12/22 1059   Wound Assessment Fibrin;Pink/red;Granulation tissue 09/12/22 1059   Drainage Amount Moderate 09/12/22 1059   Drainage Description Serosanguinous 09/12/22 1059   Odor Mild 09/12/22 1059   Kiah-wound Assessment Maceration 09/12/22 1059   Margins Defined edges 09/12/22 1059   Wound Thickness Description not for Pressure Injury Full thickness 09/12/22 1059   Number of days: 49          Percent of Wound Debrided: 100%    Total Surface Area Debrided:  44 sq cm    Diabetic/Pressure/Non Pressure Ulcers only:  Ulcer: Non-Pressure ulcer, fat layer exposed    Bleeding: Minimal    Hemostasis Achieved: by pressure    Procedural Pain: 0  / 10     Post Procedural Pain: 0 / 10     Response to treatment:  Well tolerated by patient. Wounds are significantly  and slightly smaller in size        Plan:     Treatment Note: Please see attached Discharge Instructions. These instructions were given and signed by the patient or POA    New Prescriptions    No medications on file       Orders Placed This Encounter   Procedures    Initiate Outpatient Wound Care Protocol       Discharge Instructions          [address] [facility] Physician Orders and Discharge Instructions  [address] [place] [address] [address] [place] [address]. [place] [address]  Telephone: [phone]  NAME:  Leela Hills  YOB: 1965  MEDICAL RECORD NUMBER:  [id number]  DATE: 9/12/22     Return Appointment:  Return Appointment: With Dr. Teagan Duran  in  [address] [place] [address])  [] Return Appointment for a Wound Assessment with the nurse on: Call if you need to have your dressing change prior to next week    No future appointments. [address]: none  Medically necessary services: [] Skilled Nursing [] PT (Eval & Treat) [] OT (Eval & Treat) [] Social Work [] Dietician  [] Other:    Wound care instructions: If you smoke we ask that you refrain from smoking. Smoking inhibits wounds from healing. When taking antibiotics take the entire prescription as ordered. Do not stop taking until medication is all gone unless otherwise instructed. Exercise as tolerated. Keep weight off wounds and reposition every 2 hours if applicable. Do not get wounds wet in the bath or shower unless otherwise instructed by your physician. If your wound is on your foot or leg, you may purchase a cast bag. Please ask at the pharmacy. Wash hands with soap and water prior to and after every dressing change.     [x]Wash wounds with: No need to wash. Leave dressing in place. [x]Kiah wound Topical Treatments: Do not apply lotions, creams, or ointments to the skin around the wound bed unless directed as followed:   Apply around the wound: Zinc paste         [x]Wound Location: Right Lower Leg   Apply Primary Dressing to wound:  Polymem Foam Ag  Secondary Dressing: 4X4 gauze pad and ABD pad   Avoid contact of tape with skin if possible. When to change Dressing: DO NOT CHANGE and As needed (PRN nurse assessment at wound center)      [] Multilayer Compression Wrap:  Type: Applied on Right lower leg(s)  4 Layer Compression Wrap    Do not get leg(s) with wrap wet. If wraps become too tight call the center or completely remove the wrap. Elevate leg(s) above the level of the heart when sitting. Avoid prolonged standing in one place. Applied in Clinic on 9/12/2022  The Goal of this therapy is to reduce edema and get into long term compression garments to control venous insufficiency, lymphedema and reduce occurrence of venous ulcers    Dietary:  Important dietary reminders:  1. Increase Protein intake (i.e. Lean meats, fish, eggs, legumes, and yogurt)  2. No added salt  3. If diabetic, follow a diabetic diet and check glucose prior to meals or as instructed by your physician. Dietary Supplements(Take twice a day unless instructed otherwise):  [] Ana Pramod  [] 30ml ProStat [] Efe Moose [] Ensure Max/Premier [] Other:    Your nurse  is:  Chloe Blake     Electronically signed by Shreya Arshad RN on 9/12/2022 at 11:25 R Aileen Cardoso 8 Information: Should you experience any significant changes in your wound(s) or have questions about your wound care, please contact the [address] [place] [address] at [phone]. We are open from 8:00am - 4:30p Monday, Thursday and Friday; 11:00am - 5pm on Tuesday and CLOSED Wednesday. Please give us 24-48 business hours to return your call.       Call your doctor now or seek immediate medical care if: You have symptoms of infection, such as: Increased pain, swelling, warmth, or redness. Red streaks leading from the area. Pus draining from the area. A fever.          [] Patient unable to sign Discharge Instructions given to ECF/Transportation/POA          Electronically signed by Viola Callaway MD on 9/12/2022 at 12:56 PM

## 2022-09-12 NOTE — DISCHARGE INSTRUCTIONS
215 [facility] Physician Orders and Discharge Instructions  [address] [place] [address] [address] [facility]. [facility] [address]  Telephone: [phone]  NAME:  Amelie Madden  YOB: 1965  MEDICAL RECORD NUMBER:  [id number]  DATE: 9/12/22     Return Appointment:  Return Appointment: With Dr. Clarissa Hoffman  in  [facility])  [] Return Appointment for a Wound Assessment with the nurse on: Call if you need to have your dressing change prior to next week    No future appointments. [address]: none  Medically necessary services: [] Skilled Nursing [] PT (Eval & Treat) [] OT (Eval & Treat) [] Social Work [] Dietician  [] Other:    Wound care instructions: If you smoke we ask that you refrain from smoking. Smoking inhibits wounds from healing. When taking antibiotics take the entire prescription as ordered. Do not stop taking until medication is all gone unless otherwise instructed. Exercise as tolerated. Keep weight off wounds and reposition every 2 hours if applicable. Do not get wounds wet in the bath or shower unless otherwise instructed by your physician. If your wound is on your foot or leg, you may purchase a cast bag. Please ask at the pharmacy. Wash hands with soap and water prior to and after every dressing change. [x]Wash wounds with: No need to wash. Leave dressing in place. [x]Kiah wound Topical Treatments: Do not apply lotions, creams, or ointments to the skin around the wound bed unless directed as followed:   Apply around the wound: Zinc paste         [x]Wound Location: Right Lower Leg   Apply Primary Dressing to wound:  Polymem Foam Ag  Secondary Dressing: 4X4 gauze pad and ABD pad   Avoid contact of tape with skin if possible.   When to change Dressing: DO NOT CHANGE and As needed (PRN nurse assessment at wound center)      [] Multilayer Compression Wrap:  Type: Applied on Right lower leg(s)  4 Layer Compression Wrap    Do not get leg(s) with wrap wet. If wraps become too tight call the center or completely remove the wrap. Elevate leg(s) above the level of the heart when sitting. Avoid prolonged standing in one place. Applied in Clinic on 9/12/2022  The Goal of this therapy is to reduce edema and get into long term compression garments to control venous insufficiency, lymphedema and reduce occurrence of venous ulcers    Dietary:  Important dietary reminders:  1. Increase Protein intake (i.e. Lean meats, fish, eggs, legumes, and yogurt)  2. No added salt  3. If diabetic, follow a diabetic diet and check glucose prior to meals or as instructed by your physician. Dietary Supplements(Take twice a day unless instructed otherwise):  [] Jerardo Ricardo  [] 30ml ProStat [] [place] Clear [] Ensure Max/Premier [] Other:    Your nurse  is:  Susan Guerrero     Electronically signed by Xochilt Warren RN on 9/12/2022 at 11:25 R Aileen Cardoso 8 Information: Should you experience any significant changes in your wound(s) or have questions about your wound care, please contact the [address] [place] [address] at [phone]. We are open from 8:00am - 4:30p Monday, Thursday and Friday; 11:00am - 5pm on Tuesday and CLOSED Wednesday. Please give us 24-48 business hours to return your call. Call your doctor now or seek immediate medical care if:    You have symptoms of infection, such as: Increased pain, swelling, warmth, or redness. Red streaks leading from the area. Pus draining from the area. A fever.          [] Patient unable to sign Discharge Instructions given to ECF/Transportation/POA

## 2022-09-12 NOTE — PROGRESS NOTES
Multilayer Compression Wrap   (Not Unna) Below the Knee    NAME:  Hua Birmingham  YOB: 1965  MEDICAL RECORD NUMBER:  [id number]  DATE:  9/12/2022       Removed old Multilayer wrap if present and washed leg with mild soap/water. Applied moisturizing agent to dry skin as needed. Applied primary and secondary dressing as ordered    Applied multilayered dressing below the knee to Right lower leg(s)  (4 Layer Compression Wrap ) . Instructed patient/caregiver not to remove dressing and to keep it clean and dry. Instructed patient/caregiver on complications to report to provider, such as pain, numbness in toes, heavy drainage, and slippage of dressing. Instructed patient on purpose of compression dressing and on activity and exercise recommendations.    Applied per   Guidelines    Electronically signed by Ramónluisito Rasmussen RN on 9/12/2022 at 11:53 AM

## 2022-09-19 ENCOUNTER — HOSPITAL ENCOUNTER (OUTPATIENT)
Dept: WOUND CARE | Age: 57
Discharge: HOME | End: 2022-09-19
Payer: MEDICAID

## 2022-09-19 DIAGNOSIS — Z91.199 NONCOMPLIANCE: ICD-10-CM

## 2022-09-19 DIAGNOSIS — L97.919 IDIOPATHIC CHRONIC VENOUS HYPERTENSION OF RIGHT LOWER EXTREMITY WITH ULCER (HCC): ICD-10-CM

## 2022-09-19 DIAGNOSIS — I87.311 IDIOPATHIC CHRONIC VENOUS HYPERTENSION OF RIGHT LOWER EXTREMITY WITH ULCER (HCC): ICD-10-CM

## 2022-09-19 DIAGNOSIS — I89.0 CHRONIC ACQUIRED LYMPHEDEMA: Primary | ICD-10-CM

## 2022-09-19 PROCEDURE — 11042 DBRDMT SUBQ TIS 1ST 20SQCM/<: CPT

## 2022-09-19 PROCEDURE — 11045 DBRDMT SUBQ TISS EACH ADDL: CPT | Performed by: SPECIALIST

## 2022-09-19 PROCEDURE — 11045 DBRDMT SUBQ TISS EACH ADDL: CPT

## 2022-09-19 PROCEDURE — 11042 DBRDMT SUBQ TIS 1ST 20SQCM/<: CPT | Performed by: SPECIALIST

## 2022-09-19 PROCEDURE — 29581 APPL MULTLAYER CMPRN SYS LEG: CPT

## 2022-09-19 PROCEDURE — 6370000000 HC RX 637 (ALT 250 FOR IP): Performed by: SPECIALIST

## 2022-09-19 RX ORDER — LIDOCAINE 50 MG/G
OINTMENT TOPICAL ONCE
Status: CANCELLED | OUTPATIENT
Start: 2022-09-19 | End: 2022-09-19

## 2022-09-19 RX ORDER — BACITRACIN, NEOMYCIN, POLYMYXIN B 400; 3.5; 5 [USP'U]/G; MG/G; [USP'U]/G
OINTMENT TOPICAL ONCE
Status: CANCELLED | OUTPATIENT
Start: 2022-09-19 | End: 2022-09-19

## 2022-09-19 RX ORDER — LIDOCAINE 40 MG/G
CREAM TOPICAL ONCE
Status: CANCELLED | OUTPATIENT
Start: 2022-09-19 | End: 2022-09-19

## 2022-09-19 RX ORDER — LIDOCAINE HYDROCHLORIDE 40 MG/ML
SOLUTION TOPICAL ONCE
Status: CANCELLED | OUTPATIENT
Start: 2022-09-19 | End: 2022-09-19

## 2022-09-19 RX ORDER — LIDOCAINE HYDROCHLORIDE 20 MG/ML
JELLY TOPICAL ONCE
Status: CANCELLED | OUTPATIENT
Start: 2022-09-19 | End: 2022-09-19

## 2022-09-19 RX ORDER — BACITRACIN ZINC AND POLYMYXIN B SULFATE 500; 1000 [USP'U]/G; [USP'U]/G
OINTMENT TOPICAL ONCE
Status: CANCELLED | OUTPATIENT
Start: 2022-09-19 | End: 2022-09-19

## 2022-09-19 RX ORDER — CLOBETASOL PROPIONATE 0.5 MG/G
OINTMENT TOPICAL ONCE
Status: CANCELLED | OUTPATIENT
Start: 2022-09-19 | End: 2022-09-19

## 2022-09-19 RX ORDER — BETAMETHASONE DIPROPIONATE 0.05 %
OINTMENT (GRAM) TOPICAL ONCE
Status: CANCELLED | OUTPATIENT
Start: 2022-09-19 | End: 2022-09-19

## 2022-09-19 RX ORDER — GENTAMICIN SULFATE 1 MG/G
OINTMENT TOPICAL ONCE
Status: CANCELLED | OUTPATIENT
Start: 2022-09-19 | End: 2022-09-19

## 2022-09-19 RX ORDER — DOXYCYCLINE HYCLATE 100 MG
100 TABLET ORAL 2 TIMES DAILY
Qty: 60 TABLET | Refills: 0 | Status: SHIPPED | OUTPATIENT
Start: 2022-09-19 | End: 2022-10-19

## 2022-09-19 RX ORDER — GINSENG 100 MG
CAPSULE ORAL ONCE
Status: CANCELLED | OUTPATIENT
Start: 2022-09-19 | End: 2022-09-19

## 2022-09-19 RX ORDER — LIDOCAINE HYDROCHLORIDE 40 MG/ML
SOLUTION TOPICAL ONCE
Status: COMPLETED | OUTPATIENT
Start: 2022-09-19 | End: 2022-09-19

## 2022-09-19 RX ADMIN — LIDOCAINE HYDROCHLORIDE: 40 SOLUTION TOPICAL at 11:10

## 2022-09-19 NOTE — DISCHARGE INSTRUCTIONS
215 [facility] Physician Orders and Discharge Instructions  [address] [facility]   [address] [address] [facility]. [facility] [address]  Telephone: [phone]  NAME:  Lenny Jaime  YOB: 1965  MEDICAL RECORD NUMBER:  [id number]  DATE: 9/19/22     Return Appointment:  Return Appointment: With Dr. Benny Liu  in  1 [facility])  [] Return Appointment for a Wound Assessment with the nurse on:     No future appointments. [address]: none  Medically necessary services: [] Skilled Nursing [] PT (Eval & Treat) [] OT (Eval & Treat) [] Social Work [] Dietician  [] Other:    Wound care instructions: If you smoke we ask that you refrain from smoking. Smoking inhibits wounds from healing. When taking antibiotics take the entire prescription as ordered. Do not stop taking until medication is all gone unless otherwise instructed. Exercise as tolerated. Keep weight off wounds and reposition every 2 hours if applicable. Do not get wounds wet in the bath or shower unless otherwise instructed by your physician. If your wound is on your foot or leg, you may purchase a cast bag. Please ask at the pharmacy. Wash hands with soap and water prior to and after every dressing change. [x]Wash wounds with: Vashe wash - Apply enough Vashe to soak a piece of gauze and place on wound bed for 5-10 minutes. No need to rinse after soaking. [x]Kiah wound Topical Treatments: Do not apply lotions, creams, or ointments to the skin around the wound bed unless directed as followed:   Apply around the wound: Nothing         [x]Wound Location: right lower leg   Apply Primary Dressing to wound:  polymem silver  Secondary Dressing: 4X4 gauze pad   Avoid contact of tape with skin if possible. When to change Dressing: DO NOT CHANGE      [x] Multilayer Compression Wrap:  Type: 4 Layer Compression Wrap- right    Do not get leg(s) with wrap wet.   If wraps become too tight call the center or completely remove the wrap. Elevate leg(s) above the level of the heart when sitting. Avoid prolonged standing in one place. Applied in Clinic on 9/19/2022  The Goal of this therapy is to reduce edema and get into long term compression garments to control venous insufficiency, lymphedema and reduce occurrence of venous ulcers    [x] Edema Control:  Apply: Compression Stocking on Left  - 30-40mmHG   Elevate leg(s) above the level of the heart for 30 minutes 4-5 times a day and/or when sitting. Avoid prolonged standing in one place. [x] Lymphedema Therapy:  Wear Lymphedema pumps twice a day at settings prescribed by your physician. Avoid prolonged standing in one place. Dietary:  Important dietary reminders:  1. Increase Protein intake (i.e. Lean meats, fish, eggs, legumes, and yogurt)  2. No added salt  3. If diabetic, follow a diabetic diet and check glucose prior to meals or as instructed by your physician. Dietary Supplements(Take twice a day unless instructed otherwise):  [] Helayne Rye  [] 30ml ProStat [] Nghia Ball [] Ensure Max/Premier [] Other:    Your nurse  is:  Imani Salcido     Electronically signed by Darrian Bean RN on 9/19/2022 at 11:27 R Aileen aCrdoso 8 Information: Should you experience any significant changes in your wound(s) or have questions about your wound care, please contact the [address] [place] [address] at [phone]. We are open from 8:00am - 4:30p Monday, Thursday and Friday; 11:00am - 5pm on Tuesday and CLOSED Wednesday. Please give us 24-48 business hours to return your call. Call your doctor now or seek immediate medical care if:    You have symptoms of infection, such as: Increased pain, swelling, warmth, or redness. Red streaks leading from the area. Pus draining from the area. A fever.          [] Patient unable to sign Discharge Instructions given to ECF/Transportation/POA

## 2022-09-19 NOTE — PROGRESS NOTES
[facility] [facility] [place]  Progress Note and Procedure Note      Colbert Cushing  MEDICAL RECORD NUMBER:  [id number]  AGE: 62 y.o. GENDER: male  : 1965  EPISODE DATE:  2022    Subjective:     Chief Complaint   Patient presents with    Wound Check         HISTORY of PRESENT ILLNESS HPI     Colbert Cushing is a 62 y.o. male who presents today for wound/ulcer evaluation. History of Wound Context: Well-known to the wound care center having been treated for years for chronic venous insufficiency with open wounds and significant lymphedema. He has been lost to follow-up for almost 1 year. He returns today with multiple open wounds of the right lower extremity with massive edema. He states he has been trying to wrap his legs at home. He states he was recently placed on antibiotics from the podiatry clinic for his wounds. He has been maintained on his Coumadin for chronic DVT. He claims to intermittently been using his lymphedema pumps.   Patient states drainage has been significantly less this past week  Wound/Ulcer Pain Timing/Severity: none  Quality of pain: N/A  Severity:  0 / 10   Modifying Factors: None  Associated Signs/Symptoms: edema and drainage    Ulcer Identification:  Ulcer Type: venous    Contributing Factors: edema, venous stasis, lymphedema, obesity, and anticoagulation therapy    Acute Wound: N/A not an acute wound    PAST MEDICAL HISTORY        Diagnosis Date    DVT (deep venous thrombosis) (HCC)     Hx of blood clots     Pain and swelling of right lower leg        PAST SURGICAL HISTORY    Past Surgical History:   Procedure Laterality Date    BALLOON ANGIOPLASTY, ARTERY Right 2017    at [facility] [place], ESOPHAGUS      SKIN SPLIT GRAFT Right        FAMILY HISTORY    Family History   Problem Relation Age of Onset    Diabetes Mother     Heart Attack Father        SOCIAL HISTORY    Social History     Tobacco Use    Smoking status: Never    Smokeless tobacco: Never Vaping Use    Vaping Use: Never used   Substance Use Topics    Alcohol use: No    Drug use: No       ALLERGIES    No Known Allergies    MEDICATIONS    Current Outpatient Medications on File Prior to Encounter   Medication Sig Dispense Refill    ibuprofen (ADVIL;MOTRIN) 200 MG tablet Take 200 mg by mouth every 6 hours as needed for Pain      triamcinolone (KENALOG) 0.1 % ointment Apply topically 2 times daily (Patient not taking: No sig reported) 453.6 g 1    warfarin (COUMADIN) 5 MG tablet TAKE ONE AND ONE-HALF TABLETS BY MOUTH DAILY EXCEPT TAKE 2 TABLETS DAILY ON FRIDAY 180 tablet 1    Compression Stockings MISC by Does not apply route Strength 30-40mmHg  Style: Other pull up compression stockings  Extremity: bilateral  Donning aid recommended: No 1 each 0     No current facility-administered medications on file prior to encounter. REVIEW OF SYSTEMS  Review of Systems    Pertinent items are noted in HPI. Objective: There were no vitals taken for this visit. Wt Readings from Last 3 Encounters:   04/25/22 251 lb 6.4 oz (114 kg)   04/18/22 251 lb 6.4 oz (114 kg)   10/27/21 244 lb (110.7 kg)       PHYSICAL EXAM    Extremities: no cyanosis, no clubbing, 3 + edema-  right lower extremity, and multiple full-thickness wounds containing fibrin slough significant granulation tissue and marked epithelialization at the margins of all wounds involving medial lateral posterior right knee. Dermal fibrosis and hyperpigmentation with varicosities    Assessment:      1. Chronic acquired lymphedema    2. Idiopathic chronic venous hypertension of right lower extremity with ulcer ([address]    3. Noncompliance         Procedure Note  Indications:  Based on my examination of this patient's wound(s) today, sharp excision is required to promote healing and evaluate the extent healing. Performed by: Chacho Amaral MD    Consent obtained?  Yes    Time out taken: Yes    Pain Control: Anesthetic: 4% Lidocaine Liquid Topical Debridement:Excisional Debridement    Using curette the wound was sharply debrided    down through and including the removal of  epidermis, dermis, and subcutaneous tissue. Devitalized Tissue Debrided:  fibrin, biofilm, and slough      Pre Debridement Measurements:  Are located in the Wound Documentation Flow Sheet   Wound #: 1,2,3     Post  Debridement Measurements:  Wound 07/25/22 Leg Right; Lower;Medial #1 (Active)   Wound Image   09/06/22 1104   Wound Etiology Venous 07/25/22 1040   Wound Cleansed Cleansed with saline 09/19/22 1056   Dressing/Treatment Other (comment) 09/19/22 1210   Wound Length (cm) 2.6 cm 09/19/22 1056   Wound Width (cm) 3 cm 09/19/22 1056   Wound Depth (cm) 0.1 cm 09/19/22 1056   Wound Surface Area (cm^2) 7.8 cm^2 09/19/22 1056   Change in Wound Size % (l*w) 67.02 09/19/22 1056   Wound Volume (cm^3) 0.78 cm^3 09/19/22 1056   Wound Healing % 67 09/19/22 1056   Post-Procedure Length (cm) 2.7 cm 09/19/22 1126   Post-Procedure Width (cm) 3.1 cm 09/19/22 1126   Post-Procedure Depth (cm) 0.3 cm 09/19/22 1126   Post-Procedure Surface Area (cm^2) 8.37 cm^2 09/19/22 1126   Post-Procedure Volume (cm^3) 2.511 cm^3 09/19/22 1126   Distance Tunneling (cm) 0 cm 09/19/22 1056   Undermining Maxium Distance (cm) 0 09/19/22 1056   Wound Assessment Bleeding;Granulation tissue;Slough 09/19/22 1056   Drainage Amount Moderate 09/19/22 1056   Drainage Description Serosanguinous 09/19/22 1056   Odor Mild 09/19/22 1056   Kiah-wound Assessment Intact 09/19/22 1056   Margins Defined edges 09/19/22 1056   Wound Thickness Description not for Pressure Injury Full thickness 09/19/22 1056   Number of days: 56       Wound 07/25/22 Leg Right; Anterior; Lower #2 (Active)   Wound Image   09/06/22 1104   Wound Etiology Venous 07/25/22 1040   Wound Cleansed Cleansed with saline 09/19/22 1056   Dressing/Treatment Other (comment) 09/19/22 1210   Wound Length (cm) 1 cm 09/19/22 1056   Wound Width (cm) 5 cm 09/19/22 1056   Wound Depth (cm) 0.1 cm 09/19/22 1056   Wound Surface Area (cm^2) 5 cm^2 09/19/22 1056   Change in Wound Size % (l*w) 89.04 09/19/22 1056   Wound Volume (cm^3) 0.5 cm^3 09/19/22 1056   Wound Healing % 89 09/19/22 1056   Post-Procedure Length (cm) 1.1 cm 09/19/22 1126   Post-Procedure Width (cm) 5.1 cm 09/19/22 1126   Post-Procedure Depth (cm) 0.3 cm 09/19/22 1126   Post-Procedure Surface Area (cm^2) 5.61 cm^2 09/19/22 1126   Post-Procedure Volume (cm^3) 1.683 cm^3 09/19/22 1126   Distance Tunneling (cm) 0 cm 09/19/22 1056   Undermining Maxium Distance (cm) 0 09/19/22 1056   Wound Assessment Fibrin;Granulation tissue;Pink/red;Slough 09/19/22 1056   Drainage Amount Small 09/19/22 1056   Drainage Description Serosanguinous 09/19/22 1056   Odor Mild 09/19/22 1056   Kiah-wound Assessment Intact; Maceration 09/19/22 1056   Margins Defined edges 09/19/22 1056   Wound Thickness Description not for Pressure Injury Full thickness 09/19/22 1056   Number of days: 56       Wound 07/25/22 Leg Right;Posterior; Lower #3 (Active)   Wound Image   09/06/22 1104   Wound Etiology Venous 07/25/22 1040   Wound Cleansed Cleansed with saline 09/19/22 1056   Dressing/Treatment Other (comment) 09/19/22 1210   Wound Length (cm) 3 cm 09/19/22 1056   Wound Width (cm) 7 cm 09/19/22 1056   Wound Depth (cm) 0.1 cm 09/19/22 1056   Wound Surface Area (cm^2) 21 cm^2 09/19/22 1056   Change in Wound Size % (l*w) 52.27 09/19/22 1056   Wound Volume (cm^3) 2.1 cm^3 09/19/22 1056   Wound Healing % 52 09/19/22 1056   Post-Procedure Length (cm) 3.1 cm 09/19/22 1126   Post-Procedure Width (cm) 7.1 cm 09/19/22 1126   Post-Procedure Depth (cm) 0.3 cm 09/19/22 1126   Post-Procedure Surface Area (cm^2) 22.01 cm^2 09/19/22 1126   Post-Procedure Volume (cm^3) 6.603 cm^3 09/19/22 1126   Distance Tunneling (cm) 0 cm 09/19/22 1056   Undermining Maxium Distance (cm) 0 09/19/22 1056   Wound Assessment Fibrin;Granulation tissue;Pink/red;Slough 09/19/22 1056   Drainage Amount Moderate 09/19/22 1056   Drainage Description Serosanguinous 09/19/22 1056   Odor Mild 09/19/22 1056   Kiah-wound Assessment Intact; Maceration 09/19/22 1056   Margins Defined edges 09/19/22 1056   Wound Thickness Description not for Pressure Injury Full thickness 09/19/22 1056   Number of days: 56       Wound 07/25/22 Leg Right; Lower; Lateral #4 (Active)   Wound Image   09/06/22 1104   Wound Etiology Venous 08/22/22 1037   Wound Cleansed Cleansed with saline 09/19/22 1056   Dressing/Treatment Other (comment) 09/19/22 1210   Wound Length (cm) 0.3 cm 09/19/22 1056   Wound Width (cm) 0.3 cm 09/19/22 1056   Wound Depth (cm) 0.1 cm 09/19/22 1056   Wound Surface Area (cm^2) 0.09 cm^2 09/19/22 1056   Change in Wound Size % (l*w) 98.8 09/19/22 1056   Wound Volume (cm^3) 0.009 cm^3 09/19/22 1056   Wound Healing % 99 09/19/22 1056   Post-Procedure Length (cm) 0.3 cm 09/19/22 1126   Post-Procedure Width (cm) 0.3 cm 09/19/22 1126   Post-Procedure Depth (cm) 0.1 cm 09/19/22 1126   Post-Procedure Surface Area (cm^2) 0.09 cm^2 09/19/22 1126   Post-Procedure Volume (cm^3) 0.009 cm^3 09/19/22 1126   Distance Tunneling (cm) 0 cm 09/12/22 1059   Tunneling Position ___ O'Clock 0 08/22/22 1037   Undermining Starts ___ O'Clock 0 08/22/22 1037   Undermining Ends___ O'Clock 0 08/22/22 1037   Undermining Maxium Distance (cm) 0 09/12/22 1059   Wound Assessment Fibrin;Pink/red;Granulation tissue 09/12/22 1059   Drainage Amount Moderate 09/12/22 1059   Drainage Description Serosanguinous 09/12/22 1059   Odor Mild 09/12/22 1059   Kiah-wound Assessment Maceration 09/12/22 1059   Margins Defined edges 09/12/22 1059   Wound Thickness Description not for Pressure Injury Full thickness 09/12/22 1059   Number of days: 56          Percent of Wound Debrided: 100%    Total Surface Area Debrided:  30 sq cm    Diabetic/Pressure/Non Pressure Ulcers only:  Ulcer: Non-Pressure ulcer, fat layer exposed    Bleeding: Minimal    Hemostasis Achieved: by pressure    Procedural Pain: 0  / 10     Post Procedural Pain: 0 / 10     Response to treatment:  Well tolerated by patient. Significant improvement in all wound measurements        Plan:     Treatment Note: Please see attached Discharge Instructions. These instructions were given and signed by the patient or POA    New Prescriptions    DOXYCYCLINE HYCLATE (VIBRA-TABS) 100 MG TABLET    Take 1 tablet by mouth 2 times daily       Orders Placed This Encounter   Procedures    Initiate Outpatient Wound Care Protocol       Discharge Instructions          [facility] Physician Orders and Discharge Instructions  [address] [place] [address] [address] [place] [address]  Telephone: [phone]  NAME:  Robert Quick  YOB: 1965  MEDICAL RECORD NUMBER:  [id number]  DATE: 9/19/22     Return Appointment:  Return Appointment: With Dr. Angélica Richardson  in  [address] [place] [address])  [] Return Appointment for a Wound Assessment with the nurse on:     No future appointments. [address]: none  Medically necessary services: [] Skilled Nursing [] PT (Eval & Treat) [] OT (Eval & Treat) [] Social Work [] Dietician  [] Other:    Wound care instructions: If you smoke we ask that you refrain from smoking. Smoking inhibits wounds from healing. When taking antibiotics take the entire prescription as ordered. Do not stop taking until medication is all gone unless otherwise instructed. Exercise as tolerated. Keep weight off wounds and reposition every 2 hours if applicable. Do not get wounds wet in the bath or shower unless otherwise instructed by your physician. If your wound is on your foot or leg, you may purchase a cast bag. Please ask at the pharmacy. Wash hands with soap and water prior to and after every dressing change. [x]Wash wounds with: Vashe wash - Apply enough Vashe to soak a piece of gauze and place on wound bed for 5-10 minutes.  No need to rinse after soaking. [x]Kiah wound Topical Treatments: Do not apply lotions, creams, or ointments to the skin around the wound bed unless directed as followed:   Apply around the wound: Nothing         [x]Wound Location: right lower leg   Apply Primary Dressing to wound:  polymem silver  Secondary Dressing: 4X4 gauze pad   Avoid contact of tape with skin if possible. When to change Dressing: DO NOT CHANGE      [x] Multilayer Compression Wrap:  Type: 4 Layer Compression Wrap- right    Do not get leg(s) with wrap wet. If wraps become too tight call the center or completely remove the wrap. Elevate leg(s) above the level of the heart when sitting. Avoid prolonged standing in one place. Applied in Clinic on 9/19/2022  The Goal of this therapy is to reduce edema and get into long term compression garments to control venous insufficiency, lymphedema and reduce occurrence of venous ulcers    [x] Edema Control:  Apply: Compression Stocking on Left  - 30-40mmHG   Elevate leg(s) above the level of the heart for 30 minutes 4-5 times a day and/or when sitting. Avoid prolonged standing in one place. [x] Lymphedema Therapy:  Wear Lymphedema pumps twice a day at settings prescribed by your physician. Avoid prolonged standing in one place. Dietary:  Important dietary reminders:  1. Increase Protein intake (i.e. Lean meats, fish, eggs, legumes, and yogurt)  2. No added salt  3. If diabetic, follow a diabetic diet and check glucose prior to meals or as instructed by your physician.     Dietary Supplements(Take twice a day unless instructed otherwise):  [] Kaylin Shorten  [] 30ml ProStat [] Tiara Dallas [] Ensure Max/Premier [] Other:    Your nurse  is:  Michelle Fernandes     Electronically signed by Gabrielle Fuentes RN on 9/19/2022 at 11:27 JER [address] Information: Should you experience any significant changes in your wound(s) or have questions about your wound care, please contact the [facility] [place] at [phone]. We are open from 8:00am - 4:30p Monday, Thursday and Friday; 11:00am - 5pm on Tuesday and CLOSED Wednesday. Please give us 24-48 business hours to return your call. Call your doctor now or seek immediate medical care if:    You have symptoms of infection, such as: Increased pain, swelling, warmth, or redness. Red streaks leading from the area. Pus draining from the area. A fever.          [] Patient unable to sign Discharge Instructions given to ECF/Transportation/POA         Electronically signed by Anselmo Muñoz MD on 9/19/2022 at 1:04 PM

## 2022-09-19 NOTE — PROGRESS NOTES
Multilayer Compression Wrap   (Not Unna) Below the Knee    NAME:  Jeanette Russo  YOB: 1965  MEDICAL RECORD NUMBER:  [id number]  DATE:  9/19/2022       Removed old Multilayer wrap if present and washed leg with mild soap/water. Applied moisturizing agent to dry skin as needed. Applied primary and secondary dressing as ordered    Applied multilayered dressing below the knee to Right lower leg(s)  (4 Layer Compression Wrap ) . Instructed patient/caregiver not to remove dressing and to keep it clean and dry. Instructed patient/caregiver on complications to report to provider, such as pain, numbness in toes, heavy drainage, and slippage of dressing. Instructed patient on purpose of compression dressing and on activity and exercise recommendations.    Applied per   Guidelines    Electronically signed by Daneil Apley, RN on 9/19/2022 at 12:11 PM

## 2022-09-26 ENCOUNTER — HOSPITAL ENCOUNTER (OUTPATIENT)
Dept: WOUND CARE | Age: 57
Discharge: HOME | End: 2022-09-26
Payer: MEDICAID

## 2022-09-26 VITALS
TEMPERATURE: 97.5 F | RESPIRATION RATE: 16 BRPM | DIASTOLIC BLOOD PRESSURE: 69 MMHG | SYSTOLIC BLOOD PRESSURE: 134 MMHG | HEART RATE: 64 BPM

## 2022-09-26 DIAGNOSIS — I89.0 CHRONIC ACQUIRED LYMPHEDEMA: Primary | ICD-10-CM

## 2022-09-26 DIAGNOSIS — Z91.199 NONCOMPLIANCE: ICD-10-CM

## 2022-09-26 DIAGNOSIS — I87.311 IDIOPATHIC CHRONIC VENOUS HYPERTENSION OF RIGHT LOWER EXTREMITY WITH ULCER (HCC): ICD-10-CM

## 2022-09-26 DIAGNOSIS — L97.919 IDIOPATHIC CHRONIC VENOUS HYPERTENSION OF RIGHT LOWER EXTREMITY WITH ULCER (HCC): ICD-10-CM

## 2022-09-26 PROCEDURE — 11042 DBRDMT SUBQ TIS 1ST 20SQCM/<: CPT | Performed by: SPECIALIST

## 2022-09-26 PROCEDURE — 11042 DBRDMT SUBQ TIS 1ST 20SQCM/<: CPT

## 2022-09-26 PROCEDURE — 6370000000 HC RX 637 (ALT 250 FOR IP): Performed by: SPECIALIST

## 2022-09-26 PROCEDURE — 11045 DBRDMT SUBQ TISS EACH ADDL: CPT

## 2022-09-26 PROCEDURE — 11045 DBRDMT SUBQ TISS EACH ADDL: CPT | Performed by: SPECIALIST

## 2022-09-26 PROCEDURE — 29581 APPL MULTLAYER CMPRN SYS LEG: CPT

## 2022-09-26 RX ORDER — LIDOCAINE HYDROCHLORIDE 20 MG/ML
JELLY TOPICAL ONCE
Status: CANCELLED | OUTPATIENT
Start: 2022-09-26 | End: 2022-09-26

## 2022-09-26 RX ORDER — BACITRACIN ZINC AND POLYMYXIN B SULFATE 500; 1000 [USP'U]/G; [USP'U]/G
OINTMENT TOPICAL ONCE
Status: CANCELLED | OUTPATIENT
Start: 2022-09-26 | End: 2022-09-26

## 2022-09-26 RX ORDER — LIDOCAINE HYDROCHLORIDE 40 MG/ML
SOLUTION TOPICAL ONCE
Status: CANCELLED | OUTPATIENT
Start: 2022-09-26 | End: 2022-09-26

## 2022-09-26 RX ORDER — LIDOCAINE 40 MG/G
CREAM TOPICAL ONCE
Status: CANCELLED | OUTPATIENT
Start: 2022-09-26 | End: 2022-09-26

## 2022-09-26 RX ORDER — GENTAMICIN SULFATE 1 MG/G
OINTMENT TOPICAL ONCE
Status: CANCELLED | OUTPATIENT
Start: 2022-09-26 | End: 2022-09-26

## 2022-09-26 RX ORDER — GINSENG 100 MG
CAPSULE ORAL ONCE
Status: CANCELLED | OUTPATIENT
Start: 2022-09-26 | End: 2022-09-26

## 2022-09-26 RX ORDER — LIDOCAINE 50 MG/G
OINTMENT TOPICAL ONCE
Status: CANCELLED | OUTPATIENT
Start: 2022-09-26 | End: 2022-09-26

## 2022-09-26 RX ORDER — BETAMETHASONE DIPROPIONATE 0.05 %
OINTMENT (GRAM) TOPICAL ONCE
Status: CANCELLED | OUTPATIENT
Start: 2022-09-26 | End: 2022-09-26

## 2022-09-26 RX ORDER — LIDOCAINE HYDROCHLORIDE 40 MG/ML
SOLUTION TOPICAL ONCE
Status: COMPLETED | OUTPATIENT
Start: 2022-09-26 | End: 2022-09-26

## 2022-09-26 RX ORDER — CLOBETASOL PROPIONATE 0.5 MG/G
OINTMENT TOPICAL ONCE
Status: CANCELLED | OUTPATIENT
Start: 2022-09-26 | End: 2022-09-26

## 2022-09-26 RX ORDER — BACITRACIN, NEOMYCIN, POLYMYXIN B 400; 3.5; 5 [USP'U]/G; MG/G; [USP'U]/G
OINTMENT TOPICAL ONCE
Status: CANCELLED | OUTPATIENT
Start: 2022-09-26 | End: 2022-09-26

## 2022-09-26 RX ADMIN — LIDOCAINE HYDROCHLORIDE: 40 SOLUTION TOPICAL at 11:06

## 2022-09-26 ASSESSMENT — PAIN DESCRIPTION - FREQUENCY: FREQUENCY: CONTINUOUS

## 2022-09-26 ASSESSMENT — PAIN DESCRIPTION - ORIENTATION: ORIENTATION: RIGHT

## 2022-09-26 ASSESSMENT — PAIN DESCRIPTION - DESCRIPTORS: DESCRIPTORS: BURNING

## 2022-09-26 ASSESSMENT — PAIN DESCRIPTION - PAIN TYPE: TYPE: CHRONIC PAIN

## 2022-09-26 ASSESSMENT — PAIN DESCRIPTION - LOCATION: LOCATION: LEG

## 2022-09-26 NOTE — DISCHARGE INSTRUCTIONS
CHANGE          [x] Multilayer Compression Wrap:  Type: Applied on Right lower leg(s)- 4 layer compression    Do not get leg(s) with wrap wet. If wraps become too tight call the center or completely remove the wrap. Elevate leg(s) above the level of the heart when sitting. Avoid prolonged standing in one place. Applied in Clinic on 9/26/2022  The Goal of this therapy is to reduce edema and get into long term compression garments to control venous insufficiency, lymphedema and reduce occurrence of venous ulcers    [x] Edema Control:  Apply: Compression Stocking on Left     Elevate leg(s) above the level of the heart for 30 minutes 4-5 times a day and/or when sitting. Avoid prolonged standing in one place. [x] Lymphedema Therapy:  Wear Lymphedema pumps twice a day at settings prescribed by your physician. Avoid prolonged standing in one place. Dietary:  Important dietary reminders:  1. Increase Protein intake (i.e. Lean meats, fish, eggs, legumes, and yogurt)  2. No added salt  3. If diabetic, follow a diabetic diet and check glucose prior to meals or as instructed by your physician. Dietary Supplements(Take twice a day unless instructed otherwise):  [] Madonna Laz  [] 30ml ProStat [] Yana Bar [] Ensure Max/Premier [] Other:    Your nurse  is:  Maria L Wilson     Electronically signed by Jason Etienne RN on 9/26/2022 at 11:43 AM     [facility] Information: Should you experience any significant changes in your wound(s) or have questions about your wound care, please contact the [address] [place] [address] at [phone]. We are open from 8:00am - 4:30p Monday, Thursday and Friday; 11:00am - 5pm on Tuesday and CLOSED Wednesday. Please give us 24-48 business hours to return your call. Call your doctor now or seek immediate medical care if:    You have symptoms of infection, such as: Increased pain, swelling, warmth, or redness. Red streaks leading from the area.   Pus draining from the area. A fever.          [] Patient unable to sign Discharge Instructions given to ECF/Transportation/POA

## 2022-09-26 NOTE — PROGRESS NOTES
[facility]  Progress Note and Procedure Note      Jeanette Russo  MEDICAL RECORD NUMBER:  [id number]  AGE: 62 y.o. GENDER: male  : 1965  EPISODE DATE:  2022    Subjective:     Chief Complaint   Patient presents with    Wound Check     Right lower leg         HISTORY of PRESENT ILLNESS HPI     Jeanette Russo is a 62 y.o. male who presents today for wound/ulcer evaluation. History of Wound Context: Well-known to the wound care center having been treated for years for chronic venous insufficiency with open wounds and significant lymphedema. He has been lost to follow-up for almost 1 year. He returns today with multiple open wounds of the right lower extremity with massive edema. He states he has been trying to wrap his legs at home. He states he was recently placed on antibiotics from the podiatry clinic for his wounds. He has been maintained on his Coumadin for chronic DVT. He claims to intermittently been using his lymphedema pumps.   Patient states drainage has been significantly less this past week  Wound/Ulcer Pain Timing/Severity: none  Quality of pain: N/A  Severity:  0 / 10   Modifying Factors: None  Associated Signs/Symptoms: edema and drainage    Ulcer Identification:  Ulcer Type: venous    Contributing Factors: edema, venous stasis, lymphedema, obesity, and anticoagulation therapy    Acute Wound: N/A    PAST MEDICAL HISTORY        Diagnosis Date    DVT (deep venous thrombosis) (HCC)     Hx of blood clots     Pain and swelling of right lower leg        PAST SURGICAL HISTORY    Past Surgical History:   Procedure Laterality Date    BALLOON ANGIOPLASTY, ARTERY Right 2017    at [facility], ESOPHAGUS      SKIN SPLIT GRAFT Right        FAMILY HISTORY    Family History   Problem Relation Age of Onset    Diabetes Mother     Heart Attack Father        SOCIAL HISTORY    Social History     Tobacco Use    Smoking status: Never    Smokeless tobacco: Never Vaping Use    Vaping Use: Never used   Substance Use Topics    Alcohol use: No    Drug use: No       ALLERGIES    No Known Allergies    MEDICATIONS    Current Outpatient Medications on File Prior to Encounter   Medication Sig Dispense Refill    doxycycline hyclate (VIBRA-TABS) 100 MG tablet Take 1 tablet by mouth 2 times daily 60 tablet 0    ibuprofen (ADVIL;MOTRIN) 200 MG tablet Take 200 mg by mouth every 6 hours as needed for Pain      triamcinolone (KENALOG) 0.1 % ointment Apply topically 2 times daily (Patient not taking: No sig reported) 453.6 g 1    warfarin (COUMADIN) 5 MG tablet TAKE ONE AND ONE-HALF TABLETS BY MOUTH DAILY EXCEPT TAKE 2 TABLETS DAILY ON FRIDAY 180 tablet 1    Compression Stockings MISC by Does not apply route Strength 30-40mmHg  Style: Other pull up compression stockings  Extremity: bilateral  Donning aid recommended: No 1 each 0     No current facility-administered medications on file prior to encounter. REVIEW OF SYSTEMS  Review of Systems    Pertinent items are noted in HPI. Objective:      /69   Pulse 64   Temp 97.5 °F (36.4 °C) (Temporal)   Resp 16     Wt Readings from Last 3 Encounters:   04/25/22 251 lb 6.4 oz (114 kg)   04/18/22 251 lb 6.4 oz (114 kg)   10/27/21 244 lb (110.7 kg)       PHYSICAL EXAM    Extremities: no cyanosis, no clubbing, 3 + edema-  right lower extremity, and 3 full-thickness wounds containing fibrin, slough, granulation tissue and epithelialization at the margins of wounds involving medial, anterior, posterior right knee. Periwound remains dry. Dermal fibrosis, hyperpigmentation with large varicosities  Assessment:      1. Chronic acquired lymphedema    2. Idiopathic chronic venous hypertension of right lower extremity with ulcer ([facility] [address]    3. Noncompliance         Procedure Note  Indications:  Based on my examination of this patient's wound(s) today, sharp excision is required to promote healing and evaluate the extent healing.     Performed by: Pierre Driver MD    Consent obtained? Yes    Time out taken: Yes    Pain Control: Anesthetic: 4% Lidocaine Liquid Topical     Debridement:Excisional Debridement    Using curette the wound was sharply debrided    down through and including the removal of  epidermis, dermis, and subcutaneous tissue. Devitalized Tissue Debrided:  fibrin, biofilm, and slough      Pre Debridement Measurements:  Are located in the Wound Documentation Flow Sheet   Wound #: 1, 2, and 3     Post  Debridement Measurements:  Wound 07/25/22 Leg Right; Lower;Medial #1 (Active)   Wound Image   09/06/22 1104   Wound Etiology Venous 07/25/22 1040   Wound Cleansed Cleansed with saline 09/26/22 1106   Dressing/Treatment Other (comment) 09/19/22 1210   Wound Length (cm) 2 cm 09/26/22 1106   Wound Width (cm) 3 cm 09/26/22 1106   Wound Depth (cm) 0.1 cm 09/26/22 1106   Wound Surface Area (cm^2) 6 cm^2 09/26/22 1106   Change in Wound Size % (l*w) 74.63 09/26/22 1106   Wound Volume (cm^3) 0.6 cm^3 09/26/22 1106   Wound Healing % 75 09/26/22 1106   Post-Procedure Length (cm) 2.1 cm 09/26/22 1140   Post-Procedure Width (cm) 3.1 cm 09/26/22 1140   Post-Procedure Depth (cm) 0.3 cm 09/26/22 1140   Post-Procedure Surface Area (cm^2) 6.51 cm^2 09/26/22 1140   Post-Procedure Volume (cm^3) 1. 953 cm^3 09/26/22 1140   Distance Tunneling (cm) 0 cm 09/19/22 1056   Undermining Maxium Distance (cm) 0 09/19/22 1056   Wound Assessment Granulation tissue 09/26/22 1106   Drainage Amount Small 09/26/22 1106   Drainage Description Serosanguinous 09/26/22 1106   Odor Mild 09/26/22 1106   Kiah-wound Assessment Intact 09/26/22 1106   Margins Defined edges 09/26/22 1106   Wound Thickness Description not for Pressure Injury Full thickness 09/26/22 1106   Number of days: 63       Wound 07/25/22 Leg Right; Anterior; Lower #2 (Active)   Wound Image   09/06/22 1104   Wound Etiology Venous 07/25/22 1040   Wound Cleansed Cleansed with saline 09/26/22 1106   Dressing/Treatment Other (comment) 09/19/22 1210   Wound Length (cm) 0.9 cm 09/26/22 1106   Wound Width (cm) 4.6 cm 09/26/22 1106   Wound Depth (cm) 0.1 cm 09/26/22 1106   Wound Surface Area (cm^2) 4.14 cm^2 09/26/22 1106   Change in Wound Size % (l*w) 90.92 09/26/22 1106   Wound Volume (cm^3) 0.414 cm^3 09/26/22 1106   Wound Healing % 91 09/26/22 1106   Post-Procedure Length (cm) 1 cm 09/26/22 1140   Post-Procedure Width (cm) 4.7 cm 09/26/22 1140   Post-Procedure Depth (cm) 0.3 cm 09/26/22 1140   Post-Procedure Surface Area (cm^2) 4.7 cm^2 09/26/22 1140   Post-Procedure Volume (cm^3) 1.41 cm^3 09/26/22 1140   Distance Tunneling (cm) 0 cm 09/19/22 1056   Undermining Maxium Distance (cm) 0 09/19/22 1056   Wound Assessment Fibrin;Granulation tissue;Pink/red;Slough 09/26/22 1106   Drainage Amount Small 09/26/22 1106   Drainage Description Serosanguinous 09/26/22 1106   Odor Mild 09/26/22 1106   Kiah-wound Assessment Intact 09/26/22 1106   Margins Defined edges 09/26/22 1106   Wound Thickness Description not for Pressure Injury Full thickness 09/26/22 1106   Number of days: 63       Wound 07/25/22 Leg Right;Posterior; Lower #3 (Active)   Wound Image   09/06/22 1104   Wound Etiology Venous 07/25/22 1040   Wound Cleansed Cleansed with saline 09/26/22 1106   Dressing/Treatment Other (comment) 09/19/22 1210   Wound Length (cm) 1.5 cm 09/26/22 1106   Wound Width (cm) 3.5 cm 09/26/22 1106   Wound Depth (cm) 0.1 cm 09/26/22 1106   Wound Surface Area (cm^2) 5.25 cm^2 09/26/22 1106   Change in Wound Size % (l*w) 88.07 09/26/22 1106   Wound Volume (cm^3) 0.525 cm^3 09/26/22 1106   Wound Healing % 88 09/26/22 1106   Post-Procedure Length (cm) 3.2 cm 09/26/22 1140   Post-Procedure Width (cm) 3.6 cm 09/26/22 1140   Post-Procedure Depth (cm) 0.3 cm 09/26/22 1140   Post-Procedure Surface Area (cm^2) 11.52 cm^2 09/26/22 1140   Post-Procedure Volume (cm^3) 3. 456 cm^3 09/26/22 1140   Distance Tunneling (cm) 0 cm 09/19/22 1056   Undermining Maxium Distance (cm) 0 09/19/22 1056   Wound Assessment Fibrin;Granulation tissue;Pink/red;Slough 09/26/22 1106   Drainage Amount Moderate 09/26/22 1106   Drainage Description Serosanguinous 09/26/22 1106   Odor Mild 09/26/22 1106   Kiah-wound Assessment Intact 09/26/22 1106   Margins Defined edges 09/26/22 1106   Wound Thickness Description not for Pressure Injury Full thickness 09/26/22 1106   Number of days: 63          Percent of Wound Debrided: 100%    Total Surface Area Debrided:  22 sq cm    Diabetic/Pressure/Non Pressure Ulcers only:  Ulcer: Non-Pressure ulcer, fat layer exposed    Bleeding: Minimal    Hemostasis Achieved: by pressure    Procedural Pain: 0  / 10     Post Procedural Pain: 0 / 10     Response to treatment:  Well tolerated by patient. Continued epithelialization of all wounds. Plan:     Treatment Note: Please see attached Discharge Instructions. These instructions were given and signed by the patient or POA    New Prescriptions    No medications on file       Orders Placed This Encounter   Procedures    Initiate Outpatient Wound Care Protocol       Discharge Instructions          [address] Physician Orders and Discharge Instructions  [address] [address] [address] [place] [address]. [place]. [place]  Telephone: [phone]  NAME:  Shauna Meraz  YOB: 1965  MEDICAL RECORD NUMBER:  [id number]  DATE: 9/26/22     Return Appointment:  Return Appointment: With Dr. Gabriela Pennington  in  1 [facility])  [] Return Appointment for a Wound Assessment with the nurse on:     Future Appointments   Date Time Provider Eloina Zamarripa   10/3/2022 10:45 AM Farzad Cummins MD [facility]   10/10/2022 10:45 AM Farzad Cummins  [facility] [address]: none  Medically necessary services: [] Skilled Nursing [] PT (Eval & Treat) [] OT (Eval & Treat) [] Social Work [] Dietician  [] Other:    Wound care instructions:   If you smoke we ask that you refrain from smoking. Smoking inhibits wounds from healing. When taking antibiotics take the entire prescription as ordered. Do not stop taking until medication is all gone unless otherwise instructed. Exercise as tolerated. Keep weight off wounds and reposition every 2 hours if applicable. Do not get wounds wet in the bath or shower unless otherwise instructed by your physician. If your wound is on your foot or leg, you may purchase a cast bag. Please ask at the pharmacy. Wash hands with soap and water prior to and after every dressing change. [x]Wash wounds with: Vashe wash - Apply enough Vashe to soak a piece of gauze and place on wound bed for 5-10 minutes. No need to rinse after soaking. [x]Kiah wound Topical Treatments: Do not apply lotions, creams, or ointments to the skin around the wound bed unless directed as followed:   Apply around the wound: Moisturizing lotion         [x]Wound Location: right lower leg   Apply Primary Dressing to wound:  polymem  Secondary Dressing: 4X4 gauze pad   Avoid contact of tape with skin if possible. When to change Dressing: DO NOT CHANGE          [x] Multilayer Compression Wrap:  Type: Applied on Right lower leg(s)- 4 layer compression    Do not get leg(s) with wrap wet. If wraps become too tight call the center or completely remove the wrap. Elevate leg(s) above the level of the heart when sitting. Avoid prolonged standing in one place. Applied in Clinic on 9/26/2022  The Goal of this therapy is to reduce edema and get into long term compression garments to control venous insufficiency, lymphedema and reduce occurrence of venous ulcers    [x] Edema Control:  Apply: Compression Stocking on Left     Elevate leg(s) above the level of the heart for 30 minutes 4-5 times a day and/or when sitting. Avoid prolonged standing in one place. [x] Lymphedema Therapy:  Wear Lymphedema pumps twice a day at settings prescribed by your physician.    Avoid prolonged standing in one place. Dietary:  Important dietary reminders:  1. Increase Protein intake (i.e. Lean meats, fish, eggs, legumes, and yogurt)  2. No added salt  3. If diabetic, follow a diabetic diet and check glucose prior to meals or as instructed by your physician. Dietary Supplements(Take twice a day unless instructed otherwise):  [] Jazmine Crystal  [] 30ml ProStat [] Nanette Ratel [] Ensure Max/Premier [] Other:    Your nurse  is:  Maria A Berrios     Electronically signed by Elie Gan RN on 9/26/2022 at 11:43 AM     [address] [facility] [address] Information: Should you experience any significant changes in your wound(s) or have questions about your wound care, please contact the [address] [place] [address] at [phone]. We are open from 8:00am - 4:30p Monday, Thursday and Friday; 11:00am - 5pm on Tuesday and CLOSED Wednesday. Please give us 24-48 business hours to return your call. Call your doctor now or seek immediate medical care if:    You have symptoms of infection, such as: Increased pain, swelling, warmth, or redness. Red streaks leading from the area. Pus draining from the area. A fever.          [] Patient unable to sign Discharge Instructions given to ECF/Transportation/POA         Electronically signed by Shruthi Mobley MD on 9/26/2022 at 12:33 PM

## 2022-09-26 NOTE — PROGRESS NOTES
Multilayer Compression Wrap   (Not Unna) Below the Knee    NAME:  Radha Manning  YOB: 1965  MEDICAL RECORD NUMBER:  [id number]  DATE:  9/26/2022       Removed old Multilayer wrap if present and washed leg with mild soap/water. Applied moisturizing agent to dry skin as needed. Applied primary and secondary dressing as ordered    Applied multilayered dressing below the knee to Left lower leg(s)  (4 Layer Compression Wrap ) . Instructed patient/caregiver not to remove dressing and to keep it clean and dry. Instructed patient/caregiver on complications to report to provider, such as pain, numbness in toes, heavy drainage, and slippage of dressing. Instructed patient on purpose of compression dressing and on activity and exercise recommendations.    Applied per   Guidelines    Electronically signed by Cb Stock RN on 9/26/2022 at 11:48 AM

## 2022-09-26 NOTE — TELEPHONE ENCOUNTER
Sending NS letter #2.      Parul Yanez, PharmD, [facility]  [facility]  [place]: [phone]  [place]: [phone]  9/26/2022 12:34 PM

## 2022-10-03 ENCOUNTER — HOSPITAL ENCOUNTER (OUTPATIENT)
Dept: WOUND CARE | Age: 57
Discharge: HOME | End: 2022-10-03
Payer: MEDICAID

## 2022-10-03 VITALS
HEART RATE: 68 BPM | RESPIRATION RATE: 16 BRPM | SYSTOLIC BLOOD PRESSURE: 127 MMHG | DIASTOLIC BLOOD PRESSURE: 79 MMHG | TEMPERATURE: 97.5 F

## 2022-10-03 DIAGNOSIS — L97.919 IDIOPATHIC CHRONIC VENOUS HYPERTENSION OF RIGHT LOWER EXTREMITY WITH ULCER (HCC): ICD-10-CM

## 2022-10-03 DIAGNOSIS — Z91.199 NONCOMPLIANCE: ICD-10-CM

## 2022-10-03 DIAGNOSIS — I87.311 IDIOPATHIC CHRONIC VENOUS HYPERTENSION OF RIGHT LOWER EXTREMITY WITH ULCER (HCC): ICD-10-CM

## 2022-10-03 DIAGNOSIS — I89.0 CHRONIC ACQUIRED LYMPHEDEMA: Primary | ICD-10-CM

## 2022-10-03 PROCEDURE — 29581 APPL MULTLAYER CMPRN SYS LEG: CPT

## 2022-10-03 PROCEDURE — 11042 DBRDMT SUBQ TIS 1ST 20SQCM/<: CPT

## 2022-10-03 PROCEDURE — 11042 DBRDMT SUBQ TIS 1ST 20SQCM/<: CPT | Performed by: SPECIALIST

## 2022-10-03 ASSESSMENT — PAIN DESCRIPTION - FREQUENCY: FREQUENCY: CONTINUOUS

## 2022-10-03 ASSESSMENT — PAIN DESCRIPTION - PAIN TYPE: TYPE: CHRONIC PAIN

## 2022-10-03 ASSESSMENT — PAIN DESCRIPTION - ORIENTATION: ORIENTATION: RIGHT

## 2022-10-03 ASSESSMENT — PAIN SCALES - GENERAL: PAINLEVEL_OUTOF10: 2

## 2022-10-03 ASSESSMENT — PAIN DESCRIPTION - DESCRIPTORS: DESCRIPTORS: BURNING

## 2022-10-03 ASSESSMENT — PAIN DESCRIPTION - LOCATION: LOCATION: LEG

## 2022-10-03 NOTE — DISCHARGE INSTRUCTIONS
[address] [facility] Physician Orders and Discharge Instructions  [address] [address] [address] [address] [facility]. [facility]. [place]  Telephone: [phone]  NAME:  Artur Santiago  YOB: 1965  MEDICAL RECORD NUMBER:  [id number]  DATE: 10/3/22     Return Appointment:  Return Appointment: With Dr. Zaida Cazares  in  1 [facility])  [] Return Appointment for a Wound Assessment with the nurse on:     Future Appointments   Date Time Provider Eloina Zamarripa   10/10/2022 10:45 AM Yamile Escobedo  [facility] [address]: none  Medically necessary services: [] Skilled Nursing [] PT (Eval & Treat) [] OT (Eval & Treat) [] Social Work [] Dietician  [] Other:    Wound care instructions: If you smoke we ask that you refrain from smoking. Smoking inhibits wounds from healing. When taking antibiotics take the entire prescription as ordered. Do not stop taking until medication is all gone unless otherwise instructed. Exercise as tolerated. Keep weight off wounds and reposition every 2 hours if applicable. Do not get wounds wet in the bath or shower unless otherwise instructed by your physician. If your wound is on your foot or leg, you may purchase a cast bag. Please ask at the pharmacy. Wash hands with soap and water prior to and after every dressing change. [x]Wash wounds with: Vashe wash - Apply enough Vashe to soak a piece of gauze and place on wound bed for 5-10 minutes. No need to rinse after soaking. [x]Kiah wound Topical Treatments: Do not apply lotions, creams, or ointments to the skin around the wound bed unless directed as followed:   Apply around the wound: Moisturizing lotion         [x]Wound Location: right lower leg   Apply Primary Dressing to wound:  polymem  Secondary Dressing: 4X4 gauze pad   Avoid contact of tape with skin if possible.   When to change Dressing: DO NOT CHANGE        [x] Multilayer Compression Wrap:  Type: 4 Layer Compression Wrap- right    Do not get leg(s) with wrap wet. If wraps become too tight call the center or completely remove the wrap. Elevate leg(s) above the level of the heart when sitting. Avoid prolonged standing in one place. Applied in Clinic on 10/3/2022  The Goal of this therapy is to reduce edema and get into long term compression garments to control venous insufficiency, lymphedema and reduce occurrence of venous ulcers    [x] Edema Control:  Apply: Compression Stocking on Left     Elevate leg(s) above the level of the heart for 30 minutes 4-5 times a day and/or when sitting. Avoid prolonged standing in one place. [x] Lymphedema Therapy:  Wear Lymphedema pumps twice a day at settings prescribed by your physician. Avoid prolonged standing in one place. Dietary:  Important dietary reminders:  1. Increase Protein intake (i.e. Lean meats, fish, eggs, legumes, and yogurt)  2. No added salt  3. If diabetic, follow a diabetic diet and check glucose prior to meals or as instructed by your physician. Dietary Supplements(Take twice a day unless instructed otherwise):  [] Joelene Ohs  [] 30ml ProStat [] Altamese Anger [] Ensure Max/Premier [] Other:    Your nurse  is:  Aliza Trevizo     Electronically signed by Lj Archibald RN on 10/3/2022 at 11:38 AM     215 [facility] Information: Should you experience any significant changes in your wound(s) or have questions about your wound care, please contact the [address] [place] [address] at [phone]. We are open from 8:00am - 4:30p Monday, Thursday and Friday; 11:00am - 5pm on Tuesday and CLOSED Wednesday. Please give us 24-48 business hours to return your call. Call your doctor now or seek immediate medical care if:    You have symptoms of infection, such as: Increased pain, swelling, warmth, or redness. Red streaks leading from the area. Pus draining from the area. A fever.          [] Patient unable to sign Discharge Instructions given to ECF/Transportation/POA

## 2022-10-03 NOTE — PROGRESS NOTES
[facility]  Progress Note and Procedure Note      Kenji Aparicio  MEDICAL RECORD NUMBER:  [id number]  AGE: 62 y.o. GENDER: male  : 1965  EPISODE DATE:  10/3/2022    Subjective:     Chief Complaint   Patient presents with    Wound Check     Right lower leg         HISTORY of PRESENT ILLNESS HPI     Kenji Aparicio is a 62 y.o. male who presents today for wound/ulcer evaluation. History of Wound Context: Well-known to the wound care center having been treated for years for chronic venous insufficiency with open wounds and significant lymphedema. He has been lost to follow-up for almost 1 year. He returns today with multiple open wounds of the right lower extremity with massive edema. He states he has been trying to wrap his legs at home. He states he was recently placed on antibiotics from the podiatry clinic for his wounds. He has been maintained on his Coumadin for chronic DVT. He claims to intermittently been using his lymphedema pumps. Patient states drainage has been significantly less this past week.   Patient states the wrap did slip this past week  Wound/Ulcer Pain Timing/Severity: none  Quality of pain: N/A  Severity:  0 / 10   Modifying Factors: None  Associated Signs/Symptoms: edema and drainage    Ulcer Identification:  Ulcer Type: venous    Contributing Factors: edema, venous stasis, lymphedema, obesity, and anticoagulation therapy    Acute Wound: N/A not an acute wound    PAST MEDICAL HISTORY        Diagnosis Date    DVT (deep venous thrombosis) (HCC)     Hx of blood clots     Pain and swelling of right lower leg        PAST SURGICAL HISTORY    Past Surgical History:   Procedure Laterality Date    BALLOON ANGIOPLASTY, ARTERY Right 2017    at [facility], ESOPHAGUS      SKIN SPLIT GRAFT Right        FAMILY HISTORY    Family History   Problem Relation Age of Onset    Diabetes Mother     Heart Attack Father        SOCIAL HISTORY    Social History Tobacco Use    Smoking status: Never    Smokeless tobacco: Never   Vaping Use    Vaping Use: Never used   Substance Use Topics    Alcohol use: No    Drug use: No       ALLERGIES    No Known Allergies    MEDICATIONS    Current Outpatient Medications on File Prior to Encounter   Medication Sig Dispense Refill    doxycycline hyclate (VIBRA-TABS) 100 MG tablet Take 1 tablet by mouth 2 times daily 60 tablet 0    ibuprofen (ADVIL;MOTRIN) 200 MG tablet Take 200 mg by mouth every 6 hours as needed for Pain      triamcinolone (KENALOG) 0.1 % ointment Apply topically 2 times daily (Patient not taking: No sig reported) 453.6 g 1    warfarin (COUMADIN) 5 MG tablet TAKE ONE AND ONE-HALF TABLETS BY MOUTH DAILY EXCEPT TAKE 2 TABLETS DAILY ON FRIDAY 180 tablet 1    Compression Stockings MISC by Does not apply route Strength 30-40mmHg  Style: Other pull up compression stockings  Extremity: bilateral  Donning aid recommended: No 1 each 0     No current facility-administered medications on file prior to encounter. REVIEW OF SYSTEMS  Review of Systems    Pertinent items are noted in HPI. Objective:      /79   Pulse 68   Temp 97.5 °F (36.4 °C) (Temporal)   Resp 16     Wt Readings from Last 3 Encounters:   04/25/22 251 lb 6.4 oz (114 kg)   04/18/22 251 lb 6.4 oz (114 kg)   10/27/21 244 lb (110.7 kg)       PHYSICAL EXAM    Extremities: no cyanosis, no clubbing, 3 + edema-  right lower extremity, and 3 full-thickness wounds containing fibrin minimal slough significant granulation tissue and epithelialization at margins of all wounds involving medial, anterior, posterior right knee. Dermal thickening and fibrosis. Large varicosities. Dry flaky periwound skin with hemosiderin deposition              Assessment:      1. Chronic acquired lymphedema    2. Idiopathic chronic venous hypertension of right lower extremity with ulcer ([address]    3.  Noncompliance         Procedure Note  Indications:  Based on my examination of this patient's wound(s) today, sharp excision is required to promote healing and evaluate the extent healing. Performed by: Neetu Vegas MD    Consent obtained? Yes    Time out taken: Yes    Pain Control: Anesthetic: 4% Lidocaine Liquid Topical     Debridement:Excisional Debridement    Using curette the wound was sharply debrided    down through and including the removal of  epidermis, dermis, and subcutaneous tissue. Devitalized Tissue Debrided:  fibrin, biofilm, and slough      Pre Debridement Measurements:  Are located in the Wound Documentation Flow Sheet   Wound #: 2 and 3     Post  Debridement Measurements:  Wound 07/25/22 Leg Right; Lower;Medial #1 (Active)   Wound Image   10/03/22 1116   Wound Etiology Venous 07/25/22 1040   Wound Cleansed Cleansed with saline 10/03/22 1116   Dressing/Treatment Other (comment) 09/19/22 1210   Wound Length (cm) 2 cm 10/03/22 1116   Wound Width (cm) 3 cm 10/03/22 1116   Wound Depth (cm) 0.1 cm 10/03/22 1116   Wound Surface Area (cm^2) 6 cm^2 10/03/22 1116   Change in Wound Size % (l*w) 74.63 10/03/22 1116   Wound Volume (cm^3) 0.6 cm^3 10/03/22 1116   Wound Healing % 75 10/03/22 1116   Post-Procedure Length (cm) 2 cm 10/03/22 1134   Post-Procedure Width (cm) 3 cm 10/03/22 1134   Post-Procedure Depth (cm) 0.1 cm 10/03/22 1134   Post-Procedure Surface Area (cm^2) 6 cm^2 10/03/22 1134   Post-Procedure Volume (cm^3) 0.6 cm^3 10/03/22 1134   Distance Tunneling (cm) 0 cm 09/19/22 1056   Undermining Maxium Distance (cm) 0 09/19/22 1056   Wound Assessment Granulation tissue 10/03/22 1116   Drainage Amount Small 10/03/22 1116   Drainage Description Serosanguinous 10/03/22 1116   Odor Mild 10/03/22 1116   Kiah-wound Assessment Intact 10/03/22 1116   Margins Defined edges 10/03/22 1116   Wound Thickness Description not for Pressure Injury Full thickness 10/03/22 1116   Number of days: 70       Wound 07/25/22 Leg Right; Anterior; Lower #2 (Active)   Wound Image   10/03/22 1119 Wound Etiology Venous 07/25/22 1040   Wound Cleansed Cleansed with saline 10/03/22 1116   Dressing/Treatment Other (comment) 09/19/22 1210   Wound Length (cm) 1.6 cm 10/03/22 1116   Wound Width (cm) 4.5 cm 10/03/22 1116   Wound Depth (cm) 0.1 cm 10/03/22 1116   Wound Surface Area (cm^2) 7.2 cm^2 10/03/22 1116   Change in Wound Size % (l*w) 84.21 10/03/22 1116   Wound Volume (cm^3) 0.72 cm^3 10/03/22 1116   Wound Healing % 84 10/03/22 1116   Post-Procedure Length (cm) 1.7 cm 10/03/22 1134   Post-Procedure Width (cm) 4.6 cm 10/03/22 1134   Post-Procedure Depth (cm) 0.3 cm 10/03/22 1134   Post-Procedure Surface Area (cm^2) 7.82 cm^2 10/03/22 1134   Post-Procedure Volume (cm^3) 2.346 cm^3 10/03/22 1134   Distance Tunneling (cm) 0 cm 09/19/22 1056   Undermining Maxium Distance (cm) 0 09/19/22 1056   Wound Assessment Fibrin;Granulation tissue;Pink/red;Slough 10/03/22 1116   Drainage Amount Small 10/03/22 1116   Drainage Description Serosanguinous 10/03/22 1116   Odor Mild 10/03/22 1116   Kiah-wound Assessment Intact 10/03/22 1116   Margins Defined edges 10/03/22 1116   Wound Thickness Description not for Pressure Injury Full thickness 10/03/22 1116   Number of days: 70       Wound 07/25/22 Leg Right;Posterior; Lower #3 (Active)   Wound Image   10/03/22 1116   Wound Etiology Venous 07/25/22 1040   Wound Cleansed Cleansed with saline 10/03/22 1116   Dressing/Treatment Other (comment) 09/19/22 1210   Wound Length (cm) 2 cm 10/03/22 1116   Wound Width (cm) 3.7 cm 10/03/22 1116   Wound Depth (cm) 0.1 cm 10/03/22 1116   Wound Surface Area (cm^2) 7.4 cm^2 10/03/22 1116   Change in Wound Size % (l*w) 83.18 10/03/22 1116   Wound Volume (cm^3) 0.74 cm^3 10/03/22 1116   Wound Healing % 83 10/03/22 1116   Post-Procedure Length (cm) 2.1 cm 10/03/22 1134   Post-Procedure Width (cm) 3.8 cm 10/03/22 1134   Post-Procedure Depth (cm) 0.5 cm 10/03/22 1134   Post-Procedure Surface Area (cm^2) 7.98 cm^2 10/03/22 1134   Post-Procedure Volume (cm^3) 3.99 cm^3 10/03/22 1134   Distance Tunneling (cm) 0 cm 09/19/22 1056   Undermining Maxium Distance (cm) 0 09/19/22 1056   Wound Assessment Fibrin;Granulation tissue;Pink/red;Slough 10/03/22 1116   Drainage Amount Moderate 10/03/22 1116   Drainage Description Serosanguinous 10/03/22 1116   Odor Mild 10/03/22 1116   Kiah-wound Assessment Intact 10/03/22 1116   Margins Defined edges 10/03/22 1116   Wound Thickness Description not for Pressure Injury Full thickness 10/03/22 1116   Number of days: 70          Percent of Wound Debrided: 100%    Total Surface Area Debrided:  15 sq cm    Diabetic/Pressure/Non Pressure Ulcers only:  Ulcer: Non-Pressure ulcer, fat layer exposed    Bleeding: Minimal    Hemostasis Achieved: by pressure    Procedural Pain: 0  / 10     Post Procedural Pain: 0 / 10     Response to treatment:  Well tolerated by patient. All wounds continue to slowly epithelialize        Plan:     Treatment Note: Please see attached Discharge Instructions. These instructions were given and signed by the patient or POA    New Prescriptions    No medications on file       No orders of the defined types were placed in this encounter. Discharge [facility] Physician Orders and Discharge Instructions  [address] [address] [address] [place] [address]. [place]  Telephone: [phone]  NAME:  Darren Simon  YOB: 1965  MEDICAL RECORD NUMBER:  [id number]  DATE: 10/3/22     Return Appointment:  Return Appointment: With Dr. Kathya Meyers  in  [facility])  [] Return Appointment for a Wound Assessment with the nurse on:     Future Appointments   Date Time Provider Eloina Zamarripa   10/10/2022 10:45 AM Loly Anders MD [address] [place] [address] Road: none  Medically necessary services: [] Skilled Nursing [] PT (Eval & Treat) [] OT (Eval & Treat) [] Social Work [] Dietician  [] Other:    Wound care instructions:   If you smoke we ask that you refrain from smoking. Smoking inhibits wounds from healing. When taking antibiotics take the entire prescription as ordered. Do not stop taking until medication is all gone unless otherwise instructed. Exercise as tolerated. Keep weight off wounds and reposition every 2 hours if applicable. Do not get wounds wet in the bath or shower unless otherwise instructed by your physician. If your wound is on your foot or leg, you may purchase a cast bag. Please ask at the pharmacy. Wash hands with soap and water prior to and after every dressing change. [x]Wash wounds with: Vashe wash - Apply enough Vashe to soak a piece of gauze and place on wound bed for 5-10 minutes. No need to rinse after soaking. [x]Kiah wound Topical Treatments: Do not apply lotions, creams, or ointments to the skin around the wound bed unless directed as followed:   Apply around the wound: Moisturizing lotion         [x]Wound Location: right lower leg   Apply Primary Dressing to wound:  polymem  Secondary Dressing: 4X4 gauze pad   Avoid contact of tape with skin if possible. When to change Dressing: DO NOT CHANGE        [x] Multilayer Compression Wrap:  Type: 4 Layer Compression Wrap- right    Do not get leg(s) with wrap wet. If wraps become too tight call the center or completely remove the wrap. Elevate leg(s) above the level of the heart when sitting. Avoid prolonged standing in one place. Applied in Clinic on 10/3/2022  The Goal of this therapy is to reduce edema and get into long term compression garments to control venous insufficiency, lymphedema and reduce occurrence of venous ulcers    [x] Edema Control:  Apply: Compression Stocking on Left     Elevate leg(s) above the level of the heart for 30 minutes 4-5 times a day and/or when sitting. Avoid prolonged standing in one place. [x] Lymphedema Therapy:  Wear Lymphedema pumps twice a day at settings prescribed by your physician.    Avoid prolonged standing in one place. Dietary:  Important dietary reminders:  1. Increase Protein intake (i.e. Lean meats, fish, eggs, legumes, and yogurt)  2. No added salt  3. If diabetic, follow a diabetic diet and check glucose prior to meals or as instructed by your physician. Dietary Supplements(Take twice a day unless instructed otherwise):  [] Nadir [employer]s  [] 30ml ProStat [] [place] Mitts [] Ensure Max/Premier [] Other:    Your nurse  is:  Anabelle Uribe     Electronically signed by Regino Marte RN on 10/3/2022 at 11:38 AM     [facility] Information: Should you experience any significant changes in your wound(s) or have questions about your wound care, please contact the [address] [place] [address] at [phone]. We are open from 8:00am - 4:30p Monday, Thursday and Friday; 11:00am - 5pm on Tuesday and CLOSED Wednesday. Please give us 24-48 business hours to return your call. Call your doctor now or seek immediate medical care if:    You have symptoms of infection, such as: Increased pain, swelling, warmth, or redness. Red streaks leading from the area. Pus draining from the area. A fever.          [] Patient unable to sign Discharge Instructions given to ECF/Transportation/POA         Electronically signed by Hazel Thomas MD on 10/3/2022 at 1:36 PM

## 2022-10-03 NOTE — TELEPHONE ENCOUNTER
M #5.      Jamey Eason, PharmD, BCPS  [facility]  [place]: [address] [place] [address] Street: [phone]  10/3/2022 2:19 PM

## 2022-10-10 ENCOUNTER — HOSPITAL ENCOUNTER (OUTPATIENT)
Dept: WOUND CARE | Age: 57
Discharge: HOME | End: 2022-10-10
Payer: MEDICAID

## 2022-10-10 ENCOUNTER — ANTI-COAG VISIT (OUTPATIENT)
Dept: PHARMACY | Age: 57
End: 2022-10-10

## 2022-10-10 VITALS
DIASTOLIC BLOOD PRESSURE: 83 MMHG | RESPIRATION RATE: 16 BRPM | SYSTOLIC BLOOD PRESSURE: 136 MMHG | HEART RATE: 68 BPM | TEMPERATURE: 96.7 F

## 2022-10-10 DIAGNOSIS — I87.311 IDIOPATHIC CHRONIC VENOUS HYPERTENSION OF RIGHT LOWER EXTREMITY WITH ULCER (HCC): ICD-10-CM

## 2022-10-10 DIAGNOSIS — L97.919 IDIOPATHIC CHRONIC VENOUS HYPERTENSION OF RIGHT LOWER EXTREMITY WITH ULCER (HCC): ICD-10-CM

## 2022-10-10 DIAGNOSIS — Z91.199 NONCOMPLIANCE: ICD-10-CM

## 2022-10-10 DIAGNOSIS — I82.5Z1 CHRONIC VENOUS EMBOLISM AND THROMBOSIS OF DEEP VESSELS OF DISTAL END OF RIGHT LOWER EXTREMITY (HCC): Primary | ICD-10-CM

## 2022-10-10 DIAGNOSIS — I89.0 CHRONIC ACQUIRED LYMPHEDEMA: Primary | ICD-10-CM

## 2022-10-10 LAB — INTERNATIONAL NORMALIZATION RATIO, POC: 2.9

## 2022-10-10 PROCEDURE — 99211 OFF/OP EST MAY X REQ PHY/QHP: CPT

## 2022-10-10 PROCEDURE — 11045 DBRDMT SUBQ TISS EACH ADDL: CPT | Performed by: SPECIALIST

## 2022-10-10 PROCEDURE — 11042 DBRDMT SUBQ TIS 1ST 20SQCM/<: CPT | Performed by: SPECIALIST

## 2022-10-10 PROCEDURE — 11042 DBRDMT SUBQ TIS 1ST 20SQCM/<: CPT

## 2022-10-10 PROCEDURE — 6370000000 HC RX 637 (ALT 250 FOR IP): Performed by: SPECIALIST

## 2022-10-10 PROCEDURE — 29581 APPL MULTLAYER CMPRN SYS LEG: CPT

## 2022-10-10 PROCEDURE — 85610 PROTHROMBIN TIME: CPT

## 2022-10-10 PROCEDURE — 11045 DBRDMT SUBQ TISS EACH ADDL: CPT

## 2022-10-10 RX ORDER — LIDOCAINE HYDROCHLORIDE 40 MG/ML
SOLUTION TOPICAL ONCE
Status: CANCELLED | OUTPATIENT
Start: 2022-10-10 | End: 2022-10-10

## 2022-10-10 RX ORDER — LIDOCAINE 50 MG/G
OINTMENT TOPICAL ONCE
Status: CANCELLED | OUTPATIENT
Start: 2022-10-10 | End: 2022-10-10

## 2022-10-10 RX ORDER — LIDOCAINE HYDROCHLORIDE 20 MG/ML
JELLY TOPICAL ONCE
Status: CANCELLED | OUTPATIENT
Start: 2022-10-10 | End: 2022-10-10

## 2022-10-10 RX ORDER — CLOBETASOL PROPIONATE 0.5 MG/G
OINTMENT TOPICAL ONCE
Status: CANCELLED | OUTPATIENT
Start: 2022-10-10 | End: 2022-10-10

## 2022-10-10 RX ORDER — GINSENG 100 MG
CAPSULE ORAL ONCE
Status: CANCELLED | OUTPATIENT
Start: 2022-10-10 | End: 2022-10-10

## 2022-10-10 RX ORDER — BACITRACIN, NEOMYCIN, POLYMYXIN B 400; 3.5; 5 [USP'U]/G; MG/G; [USP'U]/G
OINTMENT TOPICAL ONCE
Status: CANCELLED | OUTPATIENT
Start: 2022-10-10 | End: 2022-10-10

## 2022-10-10 RX ORDER — GENTAMICIN SULFATE 1 MG/G
OINTMENT TOPICAL ONCE
Status: CANCELLED | OUTPATIENT
Start: 2022-10-10 | End: 2022-10-10

## 2022-10-10 RX ORDER — BACITRACIN ZINC AND POLYMYXIN B SULFATE 500; 1000 [USP'U]/G; [USP'U]/G
OINTMENT TOPICAL ONCE
Status: CANCELLED | OUTPATIENT
Start: 2022-10-10 | End: 2022-10-10

## 2022-10-10 RX ORDER — LIDOCAINE 40 MG/G
CREAM TOPICAL ONCE
Status: CANCELLED | OUTPATIENT
Start: 2022-10-10 | End: 2022-10-10

## 2022-10-10 RX ORDER — BETAMETHASONE DIPROPIONATE 0.05 %
OINTMENT (GRAM) TOPICAL ONCE
Status: CANCELLED | OUTPATIENT
Start: 2022-10-10 | End: 2022-10-10

## 2022-10-10 RX ORDER — LIDOCAINE HYDROCHLORIDE 40 MG/ML
SOLUTION TOPICAL ONCE
Status: COMPLETED | OUTPATIENT
Start: 2022-10-10 | End: 2022-10-10

## 2022-10-10 RX ADMIN — LIDOCAINE HYDROCHLORIDE: 40 SOLUTION TOPICAL at 11:15

## 2022-10-10 ASSESSMENT — PAIN DESCRIPTION - ORIENTATION: ORIENTATION: RIGHT

## 2022-10-10 ASSESSMENT — PAIN DESCRIPTION - DESCRIPTORS: DESCRIPTORS: BURNING

## 2022-10-10 ASSESSMENT — PAIN SCALES - GENERAL: PAINLEVEL_OUTOF10: 4

## 2022-10-10 ASSESSMENT — PAIN DESCRIPTION - PAIN TYPE: TYPE: CHRONIC PAIN

## 2022-10-10 NOTE — PROGRESS NOTES
[facility]  Progress Note and Procedure Note      Sanju Vallejo  MEDICAL RECORD NUMBER:  [id number]  AGE: 62 y.o. GENDER: male  : 1965  EPISODE DATE:  10/10/2022    Subjective:     Chief Complaint   Patient presents with    Wound Check     Right lower leg             HISTORY of PRESENT ILLNESS HPI     Sanju Vallejo is a 62 y.o. male who presents today for wound/ulcer evaluation. History of Wound Context: Well-known to the wound care center having been treated for years for chronic venous insufficiency with open wounds and significant lymphedema. He has been lost to follow-up for almost 1 year. He returns today with multiple open wounds of the right lower extremity with massive edema. He states he has been trying to wrap his legs at home. He states he was recently placed on antibiotics from the podiatry clinic for his wounds. He has been maintained on his Coumadin for chronic DVT. He claims to intermittently been using his lymphedema pumps.   Wound/Ulcer Pain Timing/Severity: none  Quality of pain: N/A  Severity:  0 / 10   Modifying Factors: None  Associated Signs/Symptoms: edema and drainage    Ulcer Identification:  Ulcer Type: venous    Contributing Factors: edema, venous stasis, lymphedema, obesity, and anticoagulation therapy    Acute Wound: N/A not an acute wound    PAST MEDICAL HISTORY        Diagnosis Date    DVT (deep venous thrombosis) (HCC)     Hx of blood clots     Pain and swelling of right lower leg        PAST SURGICAL HISTORY    Past Surgical History:   Procedure Laterality Date    BALLOON ANGIOPLASTY, ARTERY Right 2017    at [address], ESOPHAGUS      SKIN SPLIT GRAFT Right        FAMILY HISTORY    Family History   Problem Relation Age of Onset    Diabetes Mother     Heart Attack Father        SOCIAL HISTORY    Social History     Tobacco Use    Smoking status: Never    Smokeless tobacco: Never   Vaping Use    Vaping Use: Never used Substance Use Topics    Alcohol use: No    Drug use: No       ALLERGIES    No Known Allergies    MEDICATIONS    Current Outpatient Medications on File Prior to Encounter   Medication Sig Dispense Refill    doxycycline hyclate (VIBRA-TABS) 100 MG tablet Take 1 tablet by mouth 2 times daily 60 tablet 0    ibuprofen (ADVIL;MOTRIN) 200 MG tablet Take 200 mg by mouth every 6 hours as needed for Pain      triamcinolone (KENALOG) 0.1 % ointment Apply topically 2 times daily 453.6 g 1    warfarin (COUMADIN) 5 MG tablet TAKE ONE AND ONE-HALF TABLETS BY MOUTH DAILY EXCEPT TAKE 2 TABLETS DAILY ON FRIDAY 180 tablet 1    Compression Stockings MISC by Does not apply route Strength 30-40mmHg  Style: Other pull up compression stockings  Extremity: bilateral  Donning aid recommended: No 1 each 0     No current facility-administered medications on file prior to encounter. REVIEW OF SYSTEMS  Review of Systems    Pertinent items are noted in HPI. Objective:      /83   Pulse 68   Temp (!) 96.7 °F (35.9 °C) (Temporal)   Resp 16     Wt Readings from Last 3 Encounters:   04/25/22 251 lb 6.4 oz (114 kg)   04/18/22 251 lb 6.4 oz (114 kg)   10/27/21 244 lb (110.7 kg)       PHYSICAL EXAM    Extremities: no cyanosis, no clubbing, 3 + edema-  right lower extremity, and 3 full-thickness wounds containing significant granulation tissue minimal fibrin and slough with epithelialization at all margins of the wound involving the medial, anterior, posterior right knee. Periwound dermal thickening and fibrosis presence of large varicosities and hemosiderin deposition    Assessment:      1. Chronic acquired lymphedema    2. Idiopathic chronic venous hypertension of right lower extremity with ulcer ([address]    3. Noncompliance         Procedure Note  Indications:  Based on my examination of this patient's wound(s) today, sharp excision is required to promote healing and evaluate the extent healing.     Performed by: Miguel Perales MD    Consent obtained? Yes    Time out taken: Yes    Pain Control: Anesthetic: 4% Lidocaine Liquid Topical     Debridement:Excisional Debridement    Using curette the wound was sharply debrided    down through and including the removal of  epidermis, dermis, and subcutaneous tissue. Devitalized Tissue Debrided:  fibrin and slough      Pre Debridement Measurements:  Are located in the Wound Documentation Flow Sheet   Wound #: 1, 2, and 3     Post  Debridement Measurements:  Wound 07/25/22 Leg Right; Lower;Medial #1 (Active)   Wound Image   10/03/22 1116   Wound Etiology Venous 07/25/22 1040   Wound Cleansed Cleansed with saline 10/10/22 1104   Dressing/Treatment Other (comment) 09/19/22 1210   Wound Length (cm) 2 cm 10/10/22 1104   Wound Width (cm) 3 cm 10/10/22 1104   Wound Depth (cm) 0.1 cm 10/10/22 1104   Wound Surface Area (cm^2) 6 cm^2 10/10/22 1104   Change in Wound Size % (l*w) 74.63 10/10/22 1104   Wound Volume (cm^3) 0.6 cm^3 10/10/22 1104   Wound Healing % 75 10/10/22 1104   Post-Procedure Length (cm) 2.1 cm 10/10/22 1128   Post-Procedure Width (cm) 3.1 cm 10/10/22 1128   Post-Procedure Depth (cm) 0.3 cm 10/10/22 1128   Post-Procedure Surface Area (cm^2) 6.51 cm^2 10/10/22 1128   Post-Procedure Volume (cm^3) 1. 953 cm^3 10/10/22 1128   Distance Tunneling (cm) 0 cm 10/10/22 1104   Undermining Maxium Distance (cm) 0 10/10/22 1104   Wound Assessment Pink/red;Granulation tissue 10/10/22 1104   Drainage Amount Small 10/10/22 1104   Drainage Description Serosanguinous 10/10/22 1104   Odor Mild 10/03/22 1116   Kiah-wound Assessment Intact 10/10/22 1104   Margins Defined edges 10/10/22 1104   Wound Thickness Description not for Pressure Injury Full thickness 10/10/22 1104   Number of days: 77       Wound 07/25/22 Leg Right; Anterior; Lower #2 (Active)   Wound Image   10/03/22 1116   Wound Etiology Venous 07/25/22 1040   Wound Cleansed Cleansed with saline 10/10/22 1104   Dressing/Treatment Other (comment) 09/19/22 1210   Wound Length (cm) 2 cm 10/10/22 1104   Wound Width (cm) 5 cm 10/10/22 1104   Wound Depth (cm) 0.1 cm 10/10/22 1104   Wound Surface Area (cm^2) 10 cm^2 10/10/22 1104   Change in Wound Size % (l*w) 78.07 10/10/22 1104   Wound Volume (cm^3) 1 cm^3 10/10/22 1104   Wound Healing % 78 10/10/22 1104   Post-Procedure Length (cm) 2.1 cm 10/10/22 1128   Post-Procedure Width (cm) 5.1 cm 10/10/22 1128   Post-Procedure Depth (cm) 0.3 cm 10/10/22 1128   Post-Procedure Surface Area (cm^2) 10.71 cm^2 10/10/22 1128   Post-Procedure Volume (cm^3) 3.213 cm^3 10/10/22 1128   Distance Tunneling (cm) 0 cm 10/10/22 1104   Undermining Maxium Distance (cm) 0 10/10/22 1104   Wound Assessment Fibrin;Pink/red;Granulation tissue 10/10/22 1104   Drainage Amount Moderate 10/10/22 1104   Drainage Description Serosanguinous 10/10/22 1104   Odor Mild 10/10/22 1104   Kiah-wound Assessment Intact 10/10/22 1104   Margins Defined edges 10/10/22 1104   Wound Thickness Description not for Pressure Injury Full thickness 10/10/22 1104   Number of days: 77       Wound 07/25/22 Leg Right;Posterior; Lower #3 (Active)   Wound Image   10/03/22 1116   Wound Etiology Venous 07/25/22 1040   Wound Cleansed Cleansed with saline 10/10/22 1104   Dressing/Treatment Other (comment) 09/19/22 1210   Wound Length (cm) 2 cm 10/10/22 1104   Wound Width (cm) 3.2 cm 10/10/22 1104   Wound Depth (cm) 0.1 cm 10/10/22 1104   Wound Surface Area (cm^2) 6.4 cm^2 10/10/22 1104   Change in Wound Size % (l*w) 85.45 10/10/22 1104   Wound Volume (cm^3) 0.64 cm^3 10/10/22 1104   Wound Healing % 85 10/10/22 1104   Post-Procedure Length (cm) 2.1 cm 10/10/22 1128   Post-Procedure Width (cm) 3.3 cm 10/10/22 1128   Post-Procedure Depth (cm) 0.3 cm 10/10/22 1128   Post-Procedure Surface Area (cm^2) 6.93 cm^2 10/10/22 1128   Post-Procedure Volume (cm^3) 2.079 cm^3 10/10/22 1128   Distance Tunneling (cm) 0 cm 10/10/22 1104   Undermining Maxium Distance (cm) 0 10/10/22 1104   Wound Assessment Fibrin;Pink/red 10/10/22 1104   Drainage Amount Moderate 10/10/22 1104   Drainage Description Serosanguinous 10/10/22 1104   Odor Mild 10/10/22 1104   Kiah-wound Assessment Intact 10/10/22 1104   Margins Defined edges 10/10/22 1104   Wound Thickness Description not for Pressure Injury Full thickness 10/10/22 1104   Number of days: 77          Percent of Wound Debrided: 100%    Total Surface Area Debrided:  24 sq cm    Diabetic/Pressure/Non Pressure Ulcers only:  Ulcer: Non-Pressure ulcer, fat layer exposed    Bleeding: Minimal    Hemostasis Achieved: by pressure    Procedural Pain: 0  / 10     Post Procedural Pain: 0 / 10     Response to treatment:  Well tolerated by patient. All wounds continue to slowly epithelialize        Plan:     Treatment Note: Please see attached Discharge Instructions. These instructions were given and signed by the patient or POA    New Prescriptions    No medications on file       Orders Placed This Encounter   Procedures    Initiate Outpatient Wound Care Protocol       Discharge Instructions          [address] [facility] Physician Orders and Discharge Instructions  [address] [address]  Telephone: [phone]  NAME:  Edith Garcia  YOB: 1965  MEDICAL RECORD NUMBER:  [id number]  DATE: 10/10/22     Return Appointment:  Return Appointment: With Dr. Duyen Cantu  in  [facility])  [] Return Appointment for a Wound Assessment with the nurse on:     Future Appointments   Date Time Provider Eloina Zamarripa   10/10/2022 11:45 AM [address]: none  Medically necessary services: [] Skilled Nursing [] PT (Eval & Treat) [] OT (Eval & Treat) [] Social Work [] Dietician  [] Other:    Wound care instructions: If you smoke we ask that you refrain from smoking. Smoking inhibits wounds from healing.   When taking antibiotics take the entire prescription as ordered. Do not stop taking until medication is all gone unless otherwise instructed. Exercise as tolerated. Keep weight off wounds and reposition every 2 hours if applicable. Do not get wounds wet in the bath or shower unless otherwise instructed by your physician. If your wound is on your foot or leg, you may purchase a cast bag. Please ask at the pharmacy. Wash hands with soap and water prior to and after every dressing change. [x]Wash wounds with: Vashe wash - Apply enough Vashe to soak a piece of gauze and place on wound bed for 5-10 minutes. No need to rinse after soaking. [x]Kiah wound Topical Treatments: Do not apply lotions, creams, or ointments to the skin around the wound bed unless directed as followed:   Apply around the wound: Moisturizing lotion         [x]Wound Location: right lower leg   Apply Primary Dressing to wound:  polymem  Secondary Dressing: 4X4 gauze pad   Avoid contact of tape with skin if possible. When to change Dressing: DO NOT CHANGE          [x] Multilayer Compression Wrap:  Type: Applied on Right lower leg(s)  4 Layer Compression Wrap    Do not get leg(s) with wrap wet. If wraps become too tight call the center or completely remove the wrap. Elevate leg(s) above the level of the heart when sitting. Avoid prolonged standing in one place. Applied in Clinic on 10/10/2022  The Goal of this therapy is to reduce edema and get into long term compression garments to control venous insufficiency, lymphedema and reduce occurrence of venous ulcers    [x] Edema Control:  Apply: Compression Stocking on Left     Elevate leg(s) above the level of the heart for 30 minutes 4-5 times a day and/or when sitting. Avoid prolonged standing in one place. [x] Lymphedema Therapy:  Wear Lymphedema pumps twice a day at settings prescribed by your physician. Avoid prolonged standing in one place. Dietary:  Important dietary reminders:  1.  Increase Protein intake (i.e. Lean meats, fish, eggs, legumes, and yogurt)  2. No added salt  3. If diabetic, follow a diabetic diet and check glucose prior to meals or as instructed by your physician. Dietary Supplements(Take twice a day unless instructed otherwise):  [] [place] Gamma  [] 30ml ProStat [] Elizabeth Heys [] Ensure Max/Premier [] Other:    Your nurse  is:  Eva Smiley     Electronically signed by Janusz Felipe RN on 10/10/2022 at 11:30 [address]: Should you experience any significant changes in your wound(s) or have questions about your wound care, please contact the [address] [place] [address] at [phone]. We are open from 8:00am - 4:30p Monday, Thursday and Friday; 11:00am - 5pm on Tuesday and CLOSED Wednesday. Please give us 24-48 business hours to return your call. Call your doctor now or seek immediate medical care if:    You have symptoms of infection, such as: Increased pain, swelling, warmth, or redness. Red streaks leading from the area. Pus draining from the area. A fever.          [] Patient unable to sign Discharge Instructions given to ECF/Transportation/POA         Electronically signed by Felix Goodrich MD on 10/10/2022 at 1:44 PM

## 2022-10-10 NOTE — DISCHARGE INSTRUCTIONS
[address] [facility] Physician Orders and Discharge Instructions  [address] [address] [facility]. [facility]  Telephone: [phone]  NAME:  Kenji Aparicio  YOB: 1965  MEDICAL RECORD NUMBER:  [id number]  DATE: 10/10/22     Return Appointment:  Return Appointment: With Dr. Laurita Mclaughlin  in  [facility])  [] Return Appointment for a Wound Assessment with the nurse on:     Future Appointments   Date Time Provider Eloina Zamarirpa   10/10/2022 11:45 AM [address]: none  Medically necessary services: [] Skilled Nursing [] PT (Eval & Treat) [] OT (Eval & Treat) [] Social Work [] Dietician  [] Other:    Wound care instructions: If you smoke we ask that you refrain from smoking. Smoking inhibits wounds from healing. When taking antibiotics take the entire prescription as ordered. Do not stop taking until medication is all gone unless otherwise instructed. Exercise as tolerated. Keep weight off wounds and reposition every 2 hours if applicable. Do not get wounds wet in the bath or shower unless otherwise instructed by your physician. If your wound is on your foot or leg, you may purchase a cast bag. Please ask at the pharmacy. Wash hands with soap and water prior to and after every dressing change. [x]Wash wounds with: Vashe wash - Apply enough Vashe to soak a piece of gauze and place on wound bed for 5-10 minutes. No need to rinse after soaking. [x]Kiah wound Topical Treatments: Do not apply lotions, creams, or ointments to the skin around the wound bed unless directed as followed:   Apply around the wound: Moisturizing lotion         [x]Wound Location: right lower leg   Apply Primary Dressing to wound:  polymem  Secondary Dressing: 4X4 gauze pad   Avoid contact of tape with skin if possible.   When to change Dressing: DO NOT CHANGE          [x] Multilayer Compression Wrap:  Type: Applied on Right lower leg(s)  4 Layer Compression Wrap    Do not get leg(s) with wrap wet. If wraps become too tight call the center or completely remove the wrap. Elevate leg(s) above the level of the heart when sitting. Avoid prolonged standing in one place. Applied in Clinic on 10/10/2022  The Goal of this therapy is to reduce edema and get into long term compression garments to control venous insufficiency, lymphedema and reduce occurrence of venous ulcers    [x] Edema Control:  Apply: Compression Stocking on Left     Elevate leg(s) above the level of the heart for 30 minutes 4-5 times a day and/or when sitting. Avoid prolonged standing in one place. [x] Lymphedema Therapy:  Wear Lymphedema pumps twice a day at settings prescribed by your physician. Avoid prolonged standing in one place. Dietary:  Important dietary reminders:  1. Increase Protein intake (i.e. Lean meats, fish, eggs, legumes, and yogurt)  2. No added salt  3. If diabetic, follow a diabetic diet and check glucose prior to meals or as instructed by your physician. Dietary Supplements(Take twice a day unless instructed otherwise):  [] Joel Sherin  [] 30ml ProStat [] Sidonie Maha [] Ensure Max/Premier [] Other:    Your nurse  is:  Bobby Dow     Electronically signed by Estrellita Lopes RN on 10/10/2022 at 11:30 [address]: Should you experience any significant changes in your wound(s) or have questions about your wound care, please contact the [address] [place] [address] at [phone]. We are open from 8:00am - 4:30p Monday, Thursday and Friday; 11:00am - 5pm on Tuesday and CLOSED Wednesday. Please give us 24-48 business hours to return your call. Call your doctor now or seek immediate medical care if:    You have symptoms of infection, such as: Increased pain, swelling, warmth, or redness. Red streaks leading from the area. Pus draining from the area. A fever.          [] Patient unable to sign Discharge Instructions given to ECF/Transportation/POA

## 2022-10-10 NOTE — PROGRESS NOTES
ANTICOAGULATION SERVICE    Faraz Ellison is a 62 y.o. male with PMHx significant for chronic DVT (last in Jan, 2019) who presents to clinic 10/10/2022 for anticoagulation monitoring and adjustment. Patient has been taking warfarin for 15+ years.      Anticoagulation Indication(s):  DVT    Referring Physician:  Dr. Fransisca Rai  Goal INR Range:  2-3  Duration of Anticoagulation Therapy:  Indefinite  Time of day dose taken:  AM  Product patient has at home:  warfarin 5 mg (peach)    INR Summary                            Warfarin regimen (mg)  Date INR   A/P    Sun Mon Tue Wed Thu Fri Sat Mg/wk  10/10 2.9 At goal, continue  7.5 5 7.5 7.5 7.5 5 7.5 47.5  7/27 3.1 Above goal, continue    7.5 5 7.5 7.5 7.5 5 7.5 47.5  7/6 3.1 Above goal, decrease  7.5 5 7.5 7.5 7.5 5 7.5 47.5  6/22 2.4 At goal, resume prv dose 7.5 7.5 7.5 7.5 7.5 5 7.5 50  6/1 2.6 At goal, continue   7.5 5 7.5 5 7.5 5 7.5 45  5/18 4.1 Above goal, dec (abx)  7.5 5 7.5 5 7.5 5 7.5 45  4/6 2.8 At goal, continue  7.5 7.5 7.5 7.5 7.5 5 7.5 50  12/1 2.9 At goal, continue  7.5 7.5 7.5 7.5 7.5 5 7.5 50  11/10 2.4 At goal, continue   7.5 7.5 7.5 7.5 7.5 5 7.5 50  10/27 4.4 Above goal, hold+dec  7.5 7.5 7.5 7.5 0/7.5 5 7.5 50  8/25 2.9 At goal, continue  7.5 7.5 7.5 7.5 7.5 7.5 7.5 52.5  7/14 2.5 At goal, continue   7.5 7.5 7.5 7.5 7.5 7.5 7.5 52.5  6/14 2.9 At goal, continue  7.5 7.5 7.5 7.5 0/7.5 7.5 7.5 52.5  5/26 3.9 Above goal (ABX), hold x 1 7.5 7.5 7.5 7.5 0/7.5 7.5 7.5 52.5  3/31 2.5 At goal, no change  7.5 7.5 7.5 7.5 7.5 7.5 7.5 52.5  2/17 3.0 At goal, no change  7.5 7.5 7.5 7.5 7.5 7.5 7.5 52.5  1/6 2.8 At goal, no change  7.5 7.5 7.5 7.5 7.5 7.5 7.5 52.5  12/2 2.9 At goal, no change  7.5 7.5 7.5 7.5 7.5 7.5 7.5 52.5  11/4 3.1 Above goal, continue  7.5 7.5 7.5 7.5 7.5 7.5 7.5 52.5  10/7 2.7 At goal, no change  7.5 7.5 7.5 7.5 7.5 7.5 7.5 52.5  9/9 2.7 At goal, no change  7.5 7.5 7.5 7.5 7.5 7.5 7.5 52.5  8/17 2.8 At goal, no change  7.5 7.5 7.5 7.5 7.5 7.5 7.5 52.5  8/3 3.4 Above goal, decrease  7.5 7.5 7.5 7.5 7.5 7.5 7.5 52.5  6/8 2.9 At goal, no change  7.5 7.5 7.5 7.5 7.5 10 7.5 55  2/28 2.7 At goal, no change  7.5 7.5 7.5 7.5 7.5 10 7.5 55  1/3 2.6 At goal, no change  7.5 7.5 7.5 7.5 7.5 10 7.5 55  11/20 2.8 At goal, no change  7.5 7.5 7.5 7.5 7.5 10 7.5 55  10/23 2.6 At goal, no change  7.5 7.5 7.5 7.5 7.5 10 7.5 55  8/23 2.2 At goal, no change  7.5 7.5 7.5 7.5 7.5 10 7.5 55  7/26 2.5 At goal, no change  7.5 7.5 7.5 7.5 7.5 10 7.5 55  6/28 2.3 At goal, no change  7.5 7.5 7.5 7.5 7.5 10 7.5 55  5/31 2.6 At goal, no change  7.5 7.5 7.5 7.5 7.5 10 7.5 55  5/1 2.5 At goal, no change  7.5 7.5 7.5 7.5 7.5 10 7.5 55  4/10 2.0 At goal, no change  7.5 7.5 7.5 7.5 7.5 10 7.5 55  3/13 2.6 At goal, no change  7.5 7.5 7.5 7.5 7.5 10 7.5 55  2/27 2.7 At goal, no change  7.5 7.5 7.5 7.5 7.5 10 7.5 55  2/20 2.9 At goal, no change  7.5 7.5 7.5 7.5 7.5 10 7.5 55  2/13 2.1 At goal, no change  7.5 7.5 7.5 7.5 7.5 10 7.5 55    Last CBC:  Lab Results   Component Value Date    RBC 4.28 05/03/2022    HGB 10.8 (L) 05/03/2022    HCT 33.3 (L) 05/03/2022    MCV 77.7 (L) 05/03/2022    MCH 25.3 (L) 05/03/2022    MPV 6.5 05/03/2022    RDW 17.2 (H) 05/03/2022     05/03/2022     Patient History:  Recent hospitalizations/HC visits 5/9/22: podiatry continue cipro+clinda x6 more weeks  7/28/21 ID: patient will potentially need IV abx.  Continue following with wound care    Recent medication changes 6/22/22: took last dose abx last night   5/2/22-current: ciprofloxacin 500 mg BID + clindamycin 300 mg TID (anticipated stop 6/20/22)    Medications taken regularly that may interact with warfarin or alter INR None reported   Warfarin dose taken as prescribed Yes - does not use pillbox (only takes warfarin)   Signs/symptoms of bleeding No h/o bleeding reported   Vitamin K intake Normally avoids high vitamin K foods: ~0-1 serving per week   Recent vomiting/diarrhea/fever, changes in weight or activity level None reported   Tobacco or alcohol use Patient reports smoking 0 PPD  Patient reports having 0 drinks per day   Upcoming surgeries or procedures None reported     Assessment/Plan:  Patient's INR was therapeutic again today (2.9)/ patient has not been in clinic for ~3 months. Since then he has started on doxycycline which he has been on for ~1 month. This drug can increase the INR. As pt has been on it for ~4 weeks and INR remains therapeutic will continue with current dose. Patient denies any missed/extra warfarin doses, diet changes, illness, bleeding, etc since last visit. Patient was instructed to continue warfarin to 7.5 mg daily except 5 mg on Monday and Friday. Repeat INR in 4 weeks. Discussed importance of needing more regular INR checks. Patient prefers to extend interval between appointments. But he is now seeing wound care every Monday and will try to coordinate appts. Patient was reminded to maintain consistent vitamin K intake and call with any bleeding, medication changes, or fever/vomiting/diarrhea. Patient understands dosing directions and information discussed. Dosing schedule and follow up appointment given to patient. Progress note routed to referring physician's office. Patient acknowledges working in consult agreement with pharmacist as referred by his/her physician. Next Clinic Appointment:  11/7    Please call [facility] at [phone] with any questions. Thanks!   Monet Lea, PharmD  [facility]: [address]: [phone]  10/10/2022 11:58 AM      For Pharmacy Admin Tracking Only    Total # of Interventions Recommended: 0  Total # of Interventions Accepted: 0  Time Spent (min): 15

## 2022-10-17 ENCOUNTER — HOSPITAL ENCOUNTER (OUTPATIENT)
Dept: WOUND CARE | Age: 57
Discharge: HOME | End: 2022-10-17
Payer: MEDICAID

## 2022-10-17 VITALS
HEART RATE: 60 BPM | DIASTOLIC BLOOD PRESSURE: 72 MMHG | RESPIRATION RATE: 16 BRPM | SYSTOLIC BLOOD PRESSURE: 144 MMHG | TEMPERATURE: 97.5 F

## 2022-10-17 DIAGNOSIS — I87.311 IDIOPATHIC CHRONIC VENOUS HYPERTENSION OF RIGHT LOWER EXTREMITY WITH ULCER (HCC): ICD-10-CM

## 2022-10-17 DIAGNOSIS — L97.919 IDIOPATHIC CHRONIC VENOUS HYPERTENSION OF RIGHT LOWER EXTREMITY WITH ULCER (HCC): ICD-10-CM

## 2022-10-17 DIAGNOSIS — Z91.199 NONCOMPLIANCE: ICD-10-CM

## 2022-10-17 DIAGNOSIS — I89.0 CHRONIC ACQUIRED LYMPHEDEMA: Primary | ICD-10-CM

## 2022-10-17 PROCEDURE — 29581 APPL MULTLAYER CMPRN SYS LEG: CPT

## 2022-10-17 PROCEDURE — 11045 DBRDMT SUBQ TISS EACH ADDL: CPT

## 2022-10-17 PROCEDURE — 11045 DBRDMT SUBQ TISS EACH ADDL: CPT | Performed by: SPECIALIST

## 2022-10-17 PROCEDURE — 11042 DBRDMT SUBQ TIS 1ST 20SQCM/<: CPT | Performed by: SPECIALIST

## 2022-10-17 PROCEDURE — 11042 DBRDMT SUBQ TIS 1ST 20SQCM/<: CPT

## 2022-10-17 PROCEDURE — 6370000000 HC RX 637 (ALT 250 FOR IP): Performed by: SPECIALIST

## 2022-10-17 RX ORDER — BETAMETHASONE DIPROPIONATE 0.05 %
OINTMENT (GRAM) TOPICAL ONCE
Status: CANCELLED | OUTPATIENT
Start: 2022-10-17 | End: 2022-10-17

## 2022-10-17 RX ORDER — GINSENG 100 MG
CAPSULE ORAL ONCE
Status: CANCELLED | OUTPATIENT
Start: 2022-10-17 | End: 2022-10-17

## 2022-10-17 RX ORDER — LIDOCAINE 40 MG/G
CREAM TOPICAL ONCE
Status: CANCELLED | OUTPATIENT
Start: 2022-10-17 | End: 2022-10-17

## 2022-10-17 RX ORDER — BACITRACIN ZINC AND POLYMYXIN B SULFATE 500; 1000 [USP'U]/G; [USP'U]/G
OINTMENT TOPICAL ONCE
Status: CANCELLED | OUTPATIENT
Start: 2022-10-17 | End: 2022-10-17

## 2022-10-17 RX ORDER — LIDOCAINE HYDROCHLORIDE 20 MG/ML
JELLY TOPICAL ONCE
Status: CANCELLED | OUTPATIENT
Start: 2022-10-17 | End: 2022-10-17

## 2022-10-17 RX ORDER — LIDOCAINE HYDROCHLORIDE 40 MG/ML
SOLUTION TOPICAL ONCE
Status: COMPLETED | OUTPATIENT
Start: 2022-10-17 | End: 2022-10-17

## 2022-10-17 RX ORDER — GENTAMICIN SULFATE 1 MG/G
OINTMENT TOPICAL ONCE
Status: CANCELLED | OUTPATIENT
Start: 2022-10-17 | End: 2022-10-17

## 2022-10-17 RX ORDER — LIDOCAINE 50 MG/G
OINTMENT TOPICAL ONCE
Status: CANCELLED | OUTPATIENT
Start: 2022-10-17 | End: 2022-10-17

## 2022-10-17 RX ORDER — CLOBETASOL PROPIONATE 0.5 MG/G
OINTMENT TOPICAL ONCE
Status: CANCELLED | OUTPATIENT
Start: 2022-10-17 | End: 2022-10-17

## 2022-10-17 RX ORDER — BACITRACIN, NEOMYCIN, POLYMYXIN B 400; 3.5; 5 [USP'U]/G; MG/G; [USP'U]/G
OINTMENT TOPICAL ONCE
Status: CANCELLED | OUTPATIENT
Start: 2022-10-17 | End: 2022-10-17

## 2022-10-17 RX ORDER — LIDOCAINE HYDROCHLORIDE 40 MG/ML
SOLUTION TOPICAL ONCE
Status: CANCELLED | OUTPATIENT
Start: 2022-10-17 | End: 2022-10-17

## 2022-10-17 RX ADMIN — LIDOCAINE HYDROCHLORIDE: 40 SOLUTION TOPICAL at 11:39

## 2022-10-17 NOTE — PROGRESS NOTES
Multilayer Compression Wrap   (Not Unna) Below the Knee    NAME:  Gloria Maddox  YOB: 1965  MEDICAL RECORD NUMBER:  [id number]  DATE:  10/17/2022       Removed old Multilayer wrap if present and washed leg with mild soap/water. Applied moisturizing agent to dry skin as needed. Applied primary and secondary dressing as ordered    Applied multilayered dressing below the knee to right lower leg(s)  (4 Layer Compression Wrap ) . Instructed patient/caregiver not to remove dressing and to keep it clean and dry. Instructed patient/caregiver on complications to report to provider, such as pain, numbness in toes, heavy drainage, and slippage of dressing. Instructed patient on purpose of compression dressing and on activity and exercise recommendations.    Applied per   Guidelines    Electronically signed by Kerry Bates RN on 10/17/2022 at 12:54 PM

## 2022-10-17 NOTE — DISCHARGE INSTRUCTIONS
[facility] Physician Orders and Discharge Instructions  [address] [address] [address] [facility]. [address]  Telephone: [phone]  NAME:  Husam Whitlock  YOB: 1965  MEDICAL RECORD NUMBER:  [id number]  DATE: 10/17/22     Return Appointment:  Return Appointment: With Dr. Shauna Schwartz  in  1 [facility])  [] Return Appointment for a Wound Assessment with the nurse on:     Future Appointments   Date Time Provider Eloina Zamarripa   11/7/2022 11:45 AM [address]: none  Medically necessary services: [] Skilled Nursing [] PT (Eval & Treat) [] OT (Eval & Treat) [] Social Work [] Dietician  [] Other:    Wound care instructions: If you smoke we ask that you refrain from smoking. Smoking inhibits wounds from healing. When taking antibiotics take the entire prescription as ordered. Do not stop taking until medication is all gone unless otherwise instructed. Exercise as tolerated. Keep weight off wounds and reposition every 2 hours if applicable. Do not get wounds wet in the bath or shower unless otherwise instructed by your physician. If your wound is on your foot or leg, you may purchase a cast bag. Please ask at the pharmacy. Wash hands with soap and water prior to and after every dressing change. [x]Wash wounds with: Vashe wash - Apply enough Vashe to soak a piece of gauze and place on wound bed for 5-10 minutes. No need to rinse after soaking. [x]Kiah wound Topical Treatments: Do not apply lotions, creams, or ointments to the skin around the wound bed unless directed as followed:   Apply around the wound: Moisturizing lotion         [x]Wound Location: right lower leg   Apply Primary Dressing to wound:  polymem silver  Secondary Dressing: 4X4 gauze pad   Avoid contact of tape with skin if possible.   When to change Dressing: DO NOT CHANGE      [x] Multilayer Compression Wrap:  Type: Applied Discharge Instructions given to ECF/Transportation/POA

## 2022-10-18 NOTE — PROGRESS NOTES
[facility]  Progress Note and Procedure Note      Bri Car  MEDICAL RECORD NUMBER:  [id number]  AGE: 62 y.o. GENDER: male  : 1965  EPISODE DATE:  10/17/2022    Subjective:     Chief Complaint   Patient presents with    Wound Check     Right lower leg         HISTORY of PRESENT ILLNESS HPI     Bri Car is a 62 y.o. male who presents today for wound/ulcer evaluation. History of Wound Context: Well-known to the wound care center having been treated for years for chronic venous insufficiency with open wounds and significant lymphedema. He has been lost to follow-up for almost 1 year. He returns today with multiple open wounds of the right lower extremity with massive edema. He states he has been utilizing his lymphedema pumps daily.   There has been no breakthrough drainage from the compression wraps  Wound/Ulcer Pain Timing/Severity: none  Quality of pain: N/A  Severity:  0 / 10   Modifying Factors: None  Associated Signs/Symptoms: edema and drainage    Ulcer Identification:  Ulcer Type: venous    Contributing Factors: edema, venous stasis, lymphedema, obesity, and anticoagulation therapy    Acute Wound: N/A not an acute wound    PAST MEDICAL HISTORY        Diagnosis Date    DVT (deep venous thrombosis) (HCC)     Hx of blood clots     Pain and swelling of right lower leg        PAST SURGICAL HISTORY    Past Surgical History:   Procedure Laterality Date    BALLOON ANGIOPLASTY, ARTERY Right 2017    [place] [facility] [place]      SKIN SPLIT GRAFT Right        FAMILY HISTORY    Family History   Problem Relation Age of Onset    Diabetes Mother     Heart Attack Father        SOCIAL HISTORY    Social History     Tobacco Use    Smoking status: Never    Smokeless tobacco: Never   Vaping Use    Vaping Use: Never used   Substance Use Topics    Alcohol use: No    Drug use: No       ALLERGIES    No Known Allergies    MEDICATIONS    Current Outpatient Medications on File Prior to Encounter   Medication Sig Dispense Refill    doxycycline hyclate (VIBRA-TABS) 100 MG tablet Take 1 tablet by mouth 2 times daily 60 tablet 0    ibuprofen (ADVIL;MOTRIN) 200 MG tablet Take 200 mg by mouth every 6 hours as needed for Pain      triamcinolone (KENALOG) 0.1 % ointment Apply topically 2 times daily 453.6 g 1    warfarin (COUMADIN) 5 MG tablet TAKE ONE AND ONE-HALF TABLETS BY MOUTH DAILY EXCEPT TAKE 2 TABLETS DAILY ON FRIDAY 180 tablet 1    Compression Stockings MISC by Does not apply route Strength 30-40mmHg  Style: Other pull up compression stockings  Extremity: bilateral  Donning aid recommended: No 1 each 0     No current facility-administered medications on file prior to encounter. REVIEW OF SYSTEMS  Review of Systems    Pertinent items are noted in HPI. Objective:      BP (!) 144/72   Pulse 60   Temp 97.5 °F (36.4 °C) (Temporal)   Resp 16     Wt Readings from Last 3 Encounters:   04/25/22 251 lb 6.4 oz (114 kg)   04/18/22 251 lb 6.4 oz (114 kg)   10/27/21 244 lb (110.7 kg)       PHYSICAL EXAM    Extremities: no cyanosis, no clubbing, 3 + edema-  bilateral lower extremities, and 3 full-thickness wounds containing granulation tissue with fibrin involving the medial anterior and posterior right knee. There is presence of epithelialization along the margins of all wounds. Periwound with dry flaky skin dermal thickening and fibrosis. Large varicosities and hemosiderin deposition    Assessment:      1. Chronic acquired lymphedema    2. Idiopathic chronic venous hypertension of right lower extremity with ulcer ([address]    3. Noncompliance         Procedure Note  Indications:  Based on my examination of this patient's wound(s) today, sharp excision is required to promote healing and evaluate the extent healing. Performed by: Daisy Maldonado MD    Consent obtained?  Yes    Time out taken: Yes    Pain Control: Anesthetic: 4% Lidocaine Liquid Topical     Debridement:Excisional Debridement    Using curette the wound was sharply debrided    down through and including the removal of  epidermis, dermis, and subcutaneous tissue. Devitalized Tissue Debrided:  fibrin and biofilm      Pre Debridement Measurements:  Are located in the Wound Documentation Flow Sheet   Wound #: 1, 2, and 3     Post  Debridement Measurements:  Wound 07/25/22 Leg Right; Lower;Medial #1 (Active)   Wound Image   10/03/22 1116   Wound Etiology Venous 07/25/22 1040   Wound Cleansed Cleansed with saline 10/17/22 1145   Dressing/Treatment Other (comment) 10/17/22 1252   Wound Length (cm) 2.5 cm 10/17/22 1145   Wound Width (cm) 3 cm 10/17/22 1145   Wound Depth (cm) 0.1 cm 10/17/22 1145   Wound Surface Area (cm^2) 7.5 cm^2 10/17/22 1145   Change in Wound Size % (l*w) 68.29 10/17/22 1145   Wound Volume (cm^3) 0.75 cm^3 10/17/22 1145   Wound Healing % 68 10/17/22 1145   Post-Procedure Length (cm) 2.6 cm 10/17/22 1232   Post-Procedure Width (cm) 3.1 cm 10/17/22 1232   Post-Procedure Depth (cm) 0.3 cm 10/17/22 1232   Post-Procedure Surface Area (cm^2) 8.06 cm^2 10/17/22 1232   Post-Procedure Volume (cm^3) 2.418 cm^3 10/17/22 1232   Distance Tunneling (cm) 0 cm 10/10/22 1104   Undermining Maxium Distance (cm) 0 10/10/22 1104   Wound Assessment Pink/red;Granulation tissue 10/17/22 1145   Drainage Amount Small 10/17/22 1145   Drainage Description Serosanguinous 10/17/22 1145   Odor Mild 10/17/22 1145   Kiah-wound Assessment Intact 10/17/22 1145   Margins Defined edges 10/17/22 1145   Wound Thickness Description not for Pressure Injury Full thickness 10/17/22 1145   Number of days: 84       Wound 07/25/22 Leg Right; Anterior; Lower #2 (Active)   Wound Image   10/03/22 1116   Wound Etiology Venous 07/25/22 1040   Wound Cleansed Cleansed with saline 10/17/22 1145   Dressing/Treatment Other (comment) 10/17/22 1252   Wound Length (cm) 2 cm 10/17/22 1145   Wound Width (cm) 5 cm 10/17/22 1145   Wound Depth (cm) 0.1 cm 10/17/22 1145   Wound Surface Area (cm^2) 10 cm^2 10/17/22 1145   Change in Wound Size % (l*w) 78.07 10/17/22 1145   Wound Volume (cm^3) 1 cm^3 10/17/22 1145   Wound Healing % 78 10/17/22 1145   Post-Procedure Length (cm) 2.1 cm 10/17/22 1232   Post-Procedure Width (cm) 5.1 cm 10/17/22 1232   Post-Procedure Depth (cm) 0.3 cm 10/17/22 1232   Post-Procedure Surface Area (cm^2) 10.71 cm^2 10/17/22 1232   Post-Procedure Volume (cm^3) 3.213 cm^3 10/17/22 1232   Distance Tunneling (cm) 0 cm 10/10/22 1104   Undermining Maxium Distance (cm) 0 10/10/22 1104   Wound Assessment Slough;Minor/red 10/17/22 1145   Drainage Amount Moderate 10/17/22 1145   Drainage Description Serosanguinous 10/17/22 1145   Odor Mild 10/17/22 1145   Kiah-wound Assessment Intact 10/17/22 1145   Margins Defined edges 10/17/22 1145   Wound Thickness Description not for Pressure Injury Full thickness 10/17/22 1145   Number of days: 84       Wound 07/25/22 Leg Right;Posterior; Lower #3 (Active)   Wound Image   10/03/22 1116   Wound Etiology Venous 07/25/22 1040   Wound Cleansed Cleansed with saline 10/17/22 1145   Dressing/Treatment Other (comment) 10/17/22 1252   Wound Length (cm) 1 cm 10/17/22 1145   Wound Width (cm) 3.9 cm 10/17/22 1145   Wound Depth (cm) 0.1 cm 10/17/22 1145   Wound Surface Area (cm^2) 3.9 cm^2 10/17/22 1145   Change in Wound Size % (l*w) 91.14 10/17/22 1145   Wound Volume (cm^3) 0.39 cm^3 10/17/22 1145   Wound Healing % 91 10/17/22 1145   Post-Procedure Length (cm) 1.1 cm 10/17/22 1232   Post-Procedure Width (cm) 4 cm 10/17/22 1232   Post-Procedure Depth (cm) 0.3 cm 10/17/22 1232   Post-Procedure Surface Area (cm^2) 4.4 cm^2 10/17/22 1232   Post-Procedure Volume (cm^3) 1.32 cm^3 10/17/22 1232   Distance Tunneling (cm) 0 cm 10/10/22 1104   Undermining Maxium Distance (cm) 0 10/10/22 1104   Wound Assessment Pink/red;Fibrin 10/17/22 1145   Drainage Amount Moderate 10/17/22 1145   Drainage Description Serosanguinous 10/17/22 1145   Odor Mild 10/17/22 1145 Kiah-wound Assessment Intact 10/17/22 1145   Margins Defined edges 10/17/22 1145   Wound Thickness Description not for Pressure Injury Full thickness 10/17/22 1145   Number of days: 84          Percent of Wound Debrided: 100%    Total Surface Area Debrided:  23 sq cm    Diabetic/Pressure/Non Pressure Ulcers only:  Ulcer: Non-Pressure ulcer, fat layer exposed    Bleeding: Minimal    Hemostasis Achieved: by pressure    Procedural Pain: 0  / 10     Post Procedural Pain: 0 / 10     Response to treatment:  Well tolerated by patient. Continued slow epithelialization of all wounds. Strongly encouraged patient to continue with utilization of his lymphedema pumps        Plan:     Treatment Note: Please see attached Discharge Instructions. These instructions were given and signed by the patient or POA    New Prescriptions    No medications on file       No orders of the defined types were placed in this encounter. Discharge [facility] Physician Orders and Discharge Instructions  [address] [address]  Telephone: [phone]  NAME:  No Bernard  YOB: 1965  MEDICAL RECORD NUMBER:  [id number]  DATE: 10/17/22     Return Appointment:  Return Appointment: With Dr. Delroy Bauman  in  1 [facility])  [] Return Appointment for a Wound Assessment with the nurse on:     Future Appointments   Date Time Provider Eloina Zamarripa   11/7/2022 11:45 AM [address]: none  Medically necessary services: [] Skilled Nursing [] PT (Eval & Treat) [] OT (Eval & Treat) [] Social Work [] Dietician  [] Other:    Wound care instructions: If you smoke we ask that you refrain from smoking. Smoking inhibits wounds from healing. When taking antibiotics take the entire prescription as ordered. Do not stop taking until medication is all gone unless otherwise instructed. Exercise as tolerated. Keep weight off wounds and reposition every 2 hours if applicable. Do not get wounds wet in the bath or shower unless otherwise instructed by your physician. If your wound is on your foot or leg, you may purchase a cast bag. Please ask at the pharmacy. Wash hands with soap and water prior to and after every dressing change. [x]Wash wounds with: Vashe wash - Apply enough Vashe to soak a piece of gauze and place on wound bed for 5-10 minutes. No need to rinse after soaking. [x]Kiah wound Topical Treatments: Do not apply lotions, creams, or ointments to the skin around the wound bed unless directed as followed:   Apply around the wound: Moisturizing lotion         [x]Wound Location: right lower leg   Apply Primary Dressing to wound:  polymem silver  Secondary Dressing: 4X4 gauze pad   Avoid contact of tape with skin if possible. When to change Dressing: DO NOT CHANGE      [x] Multilayer Compression Wrap:  Type: Applied on Right lower leg(s)  4 Layer Compression Wrap    Do not get leg(s) with wrap wet. If wraps become too tight call the center or completely remove the wrap. Elevate leg(s) above the level of the heart when sitting. Avoid prolonged standing in one place. Applied in Clinic on 10/17/2022  The Goal of this therapy is to reduce edema and get into long term compression garments to control venous insufficiency, lymphedema and reduce occurrence of venous ulcers    [x] Edema Control:  Apply: Compression Stocking on Left     Elevate leg(s) above the level of the heart for 30 minutes 4-5 times a day and/or when sitting. Avoid prolonged standing in one place. [x] Lymphedema Therapy:  Wear Lymphedema pumps twice a day at settings prescribed by your physician. Avoid prolonged standing in one place. Dietary:  Important dietary reminders:  1. Increase Protein intake (i.e. Lean meats, fish, eggs, legumes, and yogurt)  2. No added salt  3.  If diabetic, follow a diabetic diet and check glucose prior to meals or as instructed by your physician. Dietary Supplements(Take twice a day unless instructed otherwise):  [] Dany Kaplan  [] 30ml ProStat [] Mike Netter [] Ensure Max/Premier [] Other:    Your nurse  is:  Radhika Blum     Electronically signed by Ceferino Echeverria RN on 10/17/2022 at 12:33 PM     [facility] Information: Should you experience any significant changes in your wound(s) or have questions about your wound care, please contact the [address] [place] [address] at [phone]. We are open from 8:00am - 4:30p Monday, Thursday and Friday; 11:00am - 5pm on Tuesday and CLOSED Wednesday. Please give us 24-48 business hours to return your call. Call your doctor now or seek immediate medical care if:    You have symptoms of infection, such as: Increased pain, swelling, warmth, or redness. Red streaks leading from the area. Pus draining from the area. A fever.          [] Patient unable to sign Discharge Instructions given to ECF/Transportation/POA         Electronically signed by Sandra Lou MD on 10/18/2022 at 9:44 AM

## 2022-10-24 ENCOUNTER — HOSPITAL ENCOUNTER (OUTPATIENT)
Dept: WOUND CARE | Age: 57
Discharge: HOME | End: 2022-10-24
Payer: MEDICAID

## 2022-10-24 VITALS
DIASTOLIC BLOOD PRESSURE: 81 MMHG | RESPIRATION RATE: 16 BRPM | HEART RATE: 70 BPM | TEMPERATURE: 96.8 F | SYSTOLIC BLOOD PRESSURE: 136 MMHG

## 2022-10-24 DIAGNOSIS — I87.311 IDIOPATHIC CHRONIC VENOUS HYPERTENSION OF RIGHT LOWER EXTREMITY WITH ULCER (HCC): ICD-10-CM

## 2022-10-24 DIAGNOSIS — I89.0 CHRONIC ACQUIRED LYMPHEDEMA: Primary | ICD-10-CM

## 2022-10-24 DIAGNOSIS — L97.919 IDIOPATHIC CHRONIC VENOUS HYPERTENSION OF RIGHT LOWER EXTREMITY WITH ULCER (HCC): ICD-10-CM

## 2022-10-24 DIAGNOSIS — Z91.199 NONCOMPLIANCE: ICD-10-CM

## 2022-10-24 PROCEDURE — 29581 APPL MULTLAYER CMPRN SYS LEG: CPT

## 2022-10-24 PROCEDURE — 6370000000 HC RX 637 (ALT 250 FOR IP): Performed by: SPECIALIST

## 2022-10-24 PROCEDURE — 11045 DBRDMT SUBQ TISS EACH ADDL: CPT

## 2022-10-24 PROCEDURE — 11042 DBRDMT SUBQ TIS 1ST 20SQCM/<: CPT

## 2022-10-24 PROCEDURE — 11045 DBRDMT SUBQ TISS EACH ADDL: CPT | Performed by: SPECIALIST

## 2022-10-24 PROCEDURE — 11042 DBRDMT SUBQ TIS 1ST 20SQCM/<: CPT | Performed by: SPECIALIST

## 2022-10-24 RX ORDER — LIDOCAINE HYDROCHLORIDE 20 MG/ML
JELLY TOPICAL ONCE
Status: CANCELLED | OUTPATIENT
Start: 2022-10-24 | End: 2022-10-24

## 2022-10-24 RX ORDER — BETAMETHASONE DIPROPIONATE 0.05 %
OINTMENT (GRAM) TOPICAL ONCE
Status: CANCELLED | OUTPATIENT
Start: 2022-10-24 | End: 2022-10-24

## 2022-10-24 RX ORDER — LIDOCAINE 40 MG/G
CREAM TOPICAL ONCE
Status: CANCELLED | OUTPATIENT
Start: 2022-10-24 | End: 2022-10-24

## 2022-10-24 RX ORDER — LIDOCAINE 50 MG/G
OINTMENT TOPICAL ONCE
Status: CANCELLED | OUTPATIENT
Start: 2022-10-24 | End: 2022-10-24

## 2022-10-24 RX ORDER — BACITRACIN, NEOMYCIN, POLYMYXIN B 400; 3.5; 5 [USP'U]/G; MG/G; [USP'U]/G
OINTMENT TOPICAL ONCE
Status: CANCELLED | OUTPATIENT
Start: 2022-10-24 | End: 2022-10-24

## 2022-10-24 RX ORDER — GINSENG 100 MG
CAPSULE ORAL ONCE
Status: CANCELLED | OUTPATIENT
Start: 2022-10-24 | End: 2022-10-24

## 2022-10-24 RX ORDER — GENTAMICIN SULFATE 1 MG/G
OINTMENT TOPICAL ONCE
Status: CANCELLED | OUTPATIENT
Start: 2022-10-24 | End: 2022-10-24

## 2022-10-24 RX ORDER — BACITRACIN ZINC AND POLYMYXIN B SULFATE 500; 1000 [USP'U]/G; [USP'U]/G
OINTMENT TOPICAL ONCE
Status: CANCELLED | OUTPATIENT
Start: 2022-10-24 | End: 2022-10-24

## 2022-10-24 RX ORDER — CLOBETASOL PROPIONATE 0.5 MG/G
OINTMENT TOPICAL ONCE
Status: CANCELLED | OUTPATIENT
Start: 2022-10-24 | End: 2022-10-24

## 2022-10-24 RX ORDER — LIDOCAINE HYDROCHLORIDE 40 MG/ML
SOLUTION TOPICAL ONCE
Status: COMPLETED | OUTPATIENT
Start: 2022-10-24 | End: 2022-10-24

## 2022-10-24 RX ORDER — LIDOCAINE HYDROCHLORIDE 40 MG/ML
SOLUTION TOPICAL ONCE
Status: CANCELLED | OUTPATIENT
Start: 2022-10-24 | End: 2022-10-24

## 2022-10-24 RX ADMIN — LIDOCAINE HYDROCHLORIDE: 40 SOLUTION TOPICAL at 09:52

## 2022-10-24 ASSESSMENT — PAIN DESCRIPTION - ORIENTATION: ORIENTATION: RIGHT

## 2022-10-24 ASSESSMENT — PAIN DESCRIPTION - FREQUENCY: FREQUENCY: CONTINUOUS

## 2022-10-24 ASSESSMENT — PAIN DESCRIPTION - LOCATION: LOCATION: LEG

## 2022-10-24 ASSESSMENT — PAIN DESCRIPTION - PAIN TYPE: TYPE: CHRONIC PAIN

## 2022-10-24 ASSESSMENT — PAIN DESCRIPTION - DESCRIPTORS: DESCRIPTORS: BURNING

## 2022-10-24 ASSESSMENT — PAIN SCALES - GENERAL: PAINLEVEL_OUTOF10: 2

## 2022-10-24 NOTE — PROGRESS NOTES
Insurance Verification Request      Ordering Center: The [address] [place] [address] Road. [address] [place] [address] Phone Number: [phone]  Fax Number: [phone]    Facility NPI: [id number]  Facility Tax ID [id number]    Provider Information:      Provider's Name: Dr. Sultana Arteaga   Provider's NPI: Peggy Hernandez MD,  NPI: [id number]       Patient Information:     Kodi Mahmood  [address] [place] [address],[address] [facility]   [phone]   : 1965  AGE: 62 y.o. GENDER: male   TODAYS DATE:  10/24/2022    Insurance:     PRIMARY INSURANCE:  Plan: [facility] MEDICA  Coverage: [facility] MEDICAID  Effective Date: 2022  Group Number: [unfilled]  Subscriber Number: [id number] - (Medicaid Managed)    Payer/Plan Subscr  Sex Relation Sub. Ins. ID Effective Group Num   1.  [facility] 1965 Male Self [id number]                                   [address] [facility]       Application/product Information:     Benefit Verification Request:    Reason for Request: New Wounds    [employer] ([employer]) FAX# [phone]    Trunks, arms, legs CPT code 34593    [employer]    Has patient been treated with any other Skin Substitute on this Wound:     No, has not been treated for these wounds in past. Had  other wounds several years ago treated with skin sub    WOUND #: 1, 2, and 3 to be treated       Procedure setting: Hospital Outpatient Department    Is the Patient currently in a skilled nursing facility: No     Is the wound work related: No    Procedure date: 10/31/2022    Patient Information:     Additional Dx Codes: I87.311, L97.919    Problem List Items Addressed This Visit          Circulatory    Idiopathic chronic venous hypertension of right lower extremity with ulcer ([address]    Relevant Orders    Initiate Outpatient Wound Care Protocol       Other    Chronic acquired lymphedema - Primary    Relevant Orders    Initiate Outpatient Wound Care Protocol    Noncompliance    Relevant Orders    Initiate Outpatient Wound Care Protocol       No data recorded     Is the Patient a Diabetic: No    HgBA1c:  No results found for: LABA1C     Wound 07/25/22 Leg Right; Lower;Medial #1 (Active)   Wound Image   10/03/22 1116   Wound Etiology Venous 07/25/22 1040   Wound Cleansed Cleansed with saline 10/24/22 0946   Dressing/Treatment Collagen 10/24/22 1008   Wound Length (cm) 2.7 cm 10/24/22 0946   Wound Width (cm) 3 cm 10/24/22 0946   Wound Depth (cm) 0.1 cm 10/24/22 0946   Wound Surface Area (cm^2) 8.1 cm^2 10/24/22 0946   Change in Wound Size % (l*w) 65.75 10/24/22 0946   Wound Volume (cm^3) 0.81 cm^3 10/24/22 0946   Wound Healing % 66 10/24/22 0946   Post-Procedure Length (cm) 2.8 cm 10/24/22 1008   Post-Procedure Width (cm) 3.1 cm 10/24/22 1008   Post-Procedure Depth (cm) 0.3 cm 10/24/22 1008   Post-Procedure Surface Area (cm^2) 8.68 cm^2 10/24/22 1008   Post-Procedure Volume (cm^3) 2. 604 cm^3 10/24/22 1008   Distance Tunneling (cm) 0 cm 10/24/22 0946   Undermining Maxium Distance (cm) 0 10/24/22 0946   Wound Assessment Fibrin;Pink/red 10/24/22 0946   Drainage Amount Small 10/24/22 0946   Drainage Description Serosanguinous 10/24/22 0946   Odor Mild 10/24/22 0946   Kiah-wound Assessment Intact 10/24/22 0946   Margins Defined edges 10/24/22 0946   Wound Thickness Description not for Pressure Injury Full thickness 10/24/22 0946   Number of days: 91       Wound 07/25/22 Leg Right; Anterior; Lower #2 (Active)   Wound Image   10/03/22 1116   Wound Etiology Venous 07/25/22 1040   Wound Cleansed Cleansed with saline 10/24/22 0946   Dressing/Treatment Collagen 10/24/22 1008   Wound Length (cm) 1.5 cm 10/24/22 0946   Wound Width (cm) 4.8 cm 10/24/22 0946   Wound Depth (cm) 0.1 cm 10/24/22 0946   Wound Surface Area (cm^2) 7.2 cm^2 10/24/22 0946   Change in Wound Size % (l*w) 84.21 10/24/22 0946   Wound Volume (cm^3) 0.72 cm^3 10/24/22 0946   Wound Healing % 84 10/24/22 0946 Post-Procedure Length (cm) 1.6 cm 10/24/22 1008   Post-Procedure Width (cm) 4.9 cm 10/24/22 1008   Post-Procedure Depth (cm) 0.3 cm 10/24/22 1008   Post-Procedure Surface Area (cm^2) 7.84 cm^2 10/24/22 1008   Post-Procedure Volume (cm^3) 2.352 cm^3 10/24/22 1008   Distance Tunneling (cm) 0 cm 10/24/22 0946   Undermining Maxium Distance (cm) 0 10/24/22 0946   Wound Assessment Fibrin;Pink/red 10/24/22 0946   Drainage Amount Moderate 10/24/22 0946   Drainage Description Serosanguinous 10/24/22 0946   Odor Mild 10/24/22 0946   Kiah-wound Assessment Intact; Maceration 10/24/22 0946   Margins Defined edges 10/24/22 0946   Wound Thickness Description not for Pressure Injury Full thickness 10/24/22 0946   Number of days: 91       Wound 07/25/22 Leg Right;Posterior; Lower #3 (Active)   Wound Image   10/03/22 1116   Wound Etiology Venous 07/25/22 1040   Wound Cleansed Cleansed with saline 10/24/22 0946   Dressing/Treatment Collagen 10/24/22 1008   Wound Length (cm) 1.5 cm 10/24/22 0946   Wound Width (cm) 3.2 cm 10/24/22 0946   Wound Depth (cm) 0.1 cm 10/24/22 0946   Wound Surface Area (cm^2) 4.8 cm^2 10/24/22 0946   Change in Wound Size % (l*w) 89.09 10/24/22 0946   Wound Volume (cm^3) 0.48 cm^3 10/24/22 0946   Wound Healing % 89 10/24/22 0946   Post-Procedure Length (cm) 1.6 cm 10/24/22 1008   Post-Procedure Width (cm) 3.3 cm 10/24/22 1008   Post-Procedure Depth (cm) 0.3 cm 10/24/22 1008   Post-Procedure Surface Area (cm^2) 5.28 cm^2 10/24/22 1008   Post-Procedure Volume (cm^3) 1.584 cm^3 10/24/22 1008   Distance Tunneling (cm) 0 cm 10/24/22 0946   Undermining Maxium Distance (cm) 0 10/24/22 0946   Wound Assessment Pink/red;Fibrin 10/24/22 0946   Drainage Amount Moderate 10/24/22 0946   Drainage Description Serosanguinous 10/24/22 0946   Odor Mild 10/24/22 0946   Kiah-wound Assessment Intact; Maceration 10/24/22 0946   Margins Defined edges 10/24/22 0946   Wound Thickness Description not for Pressure Injury Full thickness 10/24/22 0946   Number of days: 91       Signature:     Electronically signed by Wes Mcelroy RN on 10/24/2022 at 3:25 PM

## 2022-10-24 NOTE — PROGRESS NOTES
[address] [place]  Progress Note and Procedure Note      Faraz Ellison  MEDICAL RECORD NUMBER:  [id number]  AGE: 62 y.o. GENDER: male  : 1965  EPISODE DATE:  10/24/2022    Subjective:     Chief Complaint   Patient presents with    Wound Check     Right lower leg         HISTORY of PRESENT ILLNESS HPI     Faraz Ellison is a 62 y.o. male who presents today for wound/ulcer evaluation. History of Wound Context: Well-known to the wound care center having been treated for years for chronic venous insufficiency with open wounds and significant lymphedema. He has been lost to follow-up for almost 1 year. He returns today with multiple open wounds of the right lower extremity with massive edema. He states he has been utilizing his lymphedema pumps daily.   There has been no breakthrough drainage from the compression wraps  Wound/Ulcer Pain Timing/Severity: none  Quality of pain: N/A  Severity:  0 / 10   Modifying Factors: None  Associated Signs/Symptoms: edema and drainage    Ulcer Identification:  Ulcer Type: venous    Contributing Factors: edema, venous stasis, lymphedema, obesity, and anticoagulation therapy    Acute Wound: N/A not an acute wound    PAST MEDICAL HISTORY        Diagnosis Date    DVT (deep venous thrombosis) (HCC)     Hx of blood clots     Pain and swelling of right lower leg        PAST SURGICAL HISTORY    Past Surgical History:   Procedure Laterality Date    BALLOON ANGIOPLASTY, ARTERY Right 2017    at [facility] [place], ESOPHAGUS      SKIN SPLIT GRAFT Right        FAMILY HISTORY    Family History   Problem Relation Age of Onset    Diabetes Mother     Heart Attack Father        SOCIAL HISTORY    Social History     Tobacco Use    Smoking status: Never    Smokeless tobacco: Never   Vaping Use    Vaping Use: Never used   Substance Use Topics    Alcohol use: No    Drug use: No       ALLERGIES    No Known Allergies    MEDICATIONS    Current Outpatient Medications on File Prior to Encounter   Medication Sig Dispense Refill    ibuprofen (ADVIL;MOTRIN) 200 MG tablet Take 200 mg by mouth every 6 hours as needed for Pain      triamcinolone (KENALOG) 0.1 % ointment Apply topically 2 times daily (Patient not taking: Reported on 10/24/2022) 453.6 g 1    warfarin (COUMADIN) 5 MG tablet TAKE ONE AND ONE-HALF TABLETS BY MOUTH DAILY EXCEPT TAKE 2 TABLETS DAILY ON FRIDAY 180 tablet 1    Compression Stockings MISC by Does not apply route Strength 30-40mmHg  Style: Other pull up compression stockings  Extremity: bilateral  Donning aid recommended: No 1 each 0     No current facility-administered medications on file prior to encounter. REVIEW OF SYSTEMS  Review of Systems    Pertinent items are noted in HPI. Objective:      /81   Pulse 70   Temp 96.8 °F (36 °C) (Temporal)   Resp 16     Wt Readings from Last 3 Encounters:   04/25/22 251 lb 6.4 oz (114 kg)   04/18/22 251 lb 6.4 oz (114 kg)   10/27/21 244 lb (110.7 kg)       PHYSICAL EXAM    Extremities: no cyanosis, no clubbing, 3 + edema-  bilateral lower extremities, and 3 full-thickness wounds containing preponderance of granulation tissue with minimal fibrin involving the medial, anterior, posterior right knee. Presence of epithelialization along the margins of all wounds periwound remains dry flaky skin. Dermal thickening and fibrosis. Large varicosities and hemosiderin deposition. Assessment:      1. Chronic acquired lymphedema    2. Idiopathic chronic venous hypertension of right lower extremity with ulcer ([address]    3. Noncompliance         Procedure Note  Indications:  Based on my examination of this patient's wound(s) today, sharp excision is required to promote healing and evaluate the extent healing. Performed by: Paul Guadarrama MD    Consent obtained?  Yes    Time out taken: Yes    Pain Control: Anesthetic: 4% Lidocaine Liquid Topical     Debridement:Excisional Debridement    Using curette the wound was sharply debrided    down through and including the removal of  epidermis, dermis, and subcutaneous tissue. Devitalized Tissue Debrided:  fibrin      Pre Debridement Measurements:  Are located in the Wound Documentation Flow Sheet   Wound #: 1, 2, and 3     Post  Debridement Measurements:  Wound 07/25/22 Leg Right; Lower;Medial #1 (Active)   Wound Image   10/03/22 1116   Wound Etiology Venous 07/25/22 1040   Wound Cleansed Cleansed with saline 10/24/22 0946   Dressing/Treatment Collagen 10/24/22 1008   Wound Length (cm) 2.7 cm 10/24/22 0946   Wound Width (cm) 3 cm 10/24/22 0946   Wound Depth (cm) 0.1 cm 10/24/22 0946   Wound Surface Area (cm^2) 8.1 cm^2 10/24/22 0946   Change in Wound Size % (l*w) 65.75 10/24/22 0946   Wound Volume (cm^3) 0.81 cm^3 10/24/22 0946   Wound Healing % 66 10/24/22 0946   Post-Procedure Length (cm) 2.8 cm 10/24/22 1008   Post-Procedure Width (cm) 3.1 cm 10/24/22 1008   Post-Procedure Depth (cm) 0.3 cm 10/24/22 1008   Post-Procedure Surface Area (cm^2) 8.68 cm^2 10/24/22 1008   Post-Procedure Volume (cm^3) 2. 604 cm^3 10/24/22 1008   Distance Tunneling (cm) 0 cm 10/24/22 0946   Undermining Maxium Distance (cm) 0 10/24/22 0946   Wound Assessment Fibrin;Pink/red 10/24/22 0946   Drainage Amount Small 10/24/22 0946   Drainage Description Serosanguinous 10/24/22 0946   Odor Mild 10/24/22 0946   Kiah-wound Assessment Intact 10/24/22 0946   Margins Defined edges 10/24/22 0946   Wound Thickness Description not for Pressure Injury Full thickness 10/24/22 0946   Number of days: 90       Wound 07/25/22 Leg Right; Anterior; Lower #2 (Active)   Wound Image   10/03/22 1116   Wound Etiology Venous 07/25/22 1040   Wound Cleansed Cleansed with saline 10/24/22 0946   Dressing/Treatment Collagen 10/24/22 1008   Wound Length (cm) 1.5 cm 10/24/22 0946   Wound Width (cm) 4.8 cm 10/24/22 0946   Wound Depth (cm) 0.1 cm 10/24/22 0946   Wound Surface Area (cm^2) 7.2 cm^2 10/24/22 0946   Change in Wound Size % (l*w) 84.21 10/24/22 0946   Wound Volume (cm^3) 0.72 cm^3 10/24/22 0946   Wound Healing % 84 10/24/22 0946   Post-Procedure Length (cm) 1.6 cm 10/24/22 1008   Post-Procedure Width (cm) 4.9 cm 10/24/22 1008   Post-Procedure Depth (cm) 0.3 cm 10/24/22 1008   Post-Procedure Surface Area (cm^2) 7.84 cm^2 10/24/22 1008   Post-Procedure Volume (cm^3) 2.352 cm^3 10/24/22 1008   Distance Tunneling (cm) 0 cm 10/24/22 0946   Undermining Maxium Distance (cm) 0 10/24/22 0946   Wound Assessment Fibrin;Pink/red 10/24/22 0946   Drainage Amount Moderate 10/24/22 0946   Drainage Description Serosanguinous 10/24/22 0946   Odor Mild 10/24/22 0946   Kiah-wound Assessment Intact; Maceration 10/24/22 0946   Margins Defined edges 10/24/22 0946   Wound Thickness Description not for Pressure Injury Full thickness 10/24/22 0946   Number of days: 90       Wound 07/25/22 Leg Right;Posterior; Lower #3 (Active)   Wound Image   10/03/22 1116   Wound Etiology Venous 07/25/22 1040   Wound Cleansed Cleansed with saline 10/24/22 0946   Dressing/Treatment Collagen 10/24/22 1008   Wound Length (cm) 1.5 cm 10/24/22 0946   Wound Width (cm) 3.2 cm 10/24/22 0946   Wound Depth (cm) 0.1 cm 10/24/22 0946   Wound Surface Area (cm^2) 4.8 cm^2 10/24/22 0946   Change in Wound Size % (l*w) 89.09 10/24/22 0946   Wound Volume (cm^3) 0.48 cm^3 10/24/22 0946   Wound Healing % 89 10/24/22 0946   Post-Procedure Length (cm) 1.6 cm 10/24/22 1008   Post-Procedure Width (cm) 3.3 cm 10/24/22 1008   Post-Procedure Depth (cm) 0.3 cm 10/24/22 1008   Post-Procedure Surface Area (cm^2) 5.28 cm^2 10/24/22 1008   Post-Procedure Volume (cm^3) 1.584 cm^3 10/24/22 1008   Distance Tunneling (cm) 0 cm 10/24/22 0946   Undermining Maxium Distance (cm) 0 10/24/22 0946   Wound Assessment Pink/red;Fibrin 10/24/22 0946   Drainage Amount Moderate 10/24/22 0946   Drainage Description Serosanguinous 10/24/22 0946   Odor Mild 10/24/22 0946   Kiah-wound Assessment Intact; Maceration 10/24/22 0946   Margins Defined edges 10/24/22 0946   Wound Thickness Description not for Pressure Injury Full thickness 10/24/22 0946   Number of days: 90          Percent of Wound Debrided: 100%    Total Surface Area Debrided:  21 sq cm    Diabetic/Pressure/Non Pressure Ulcers only:  Ulcer: Non-Pressure ulcer, fat layer exposed    Bleeding: Minimal    Hemostasis Achieved: by pressure    Procedural Pain: 0  / 10     Post Procedural Pain: 0 / 10     Response to treatment:  Well tolerated by patient. Wound appearance continues to improve with granulation tissue. Persistent slow epithelialization of all wounds        Plan:     Treatment Note: Please see attached Discharge Instructions. These instructions were given and signed by the patient or POA    New Prescriptions    No medications on file       Orders Placed This Encounter   Procedures    Initiate Outpatient Wound Care Protocol       Discharge Instructions          215 [facility] Physician Orders and Discharge Instructions  [address] [place]. [place]  Telephone: [phone]  NAME:  Sarah Joseph  YOB: 1965  MEDICAL RECORD NUMBER:  [id number]  DATE: 10/24/22     Return Appointment:  Return Appointment: With Dr. Tere Yan  in  1 [facility])  [] Return Appointment for a Wound Assessment with the nurse on:     Future Appointments   Date Time Provider Eloina Zamarripa   11/7/2022 11:45 AM [address]: none  Medically necessary services: [] Skilled Nursing [] PT (Eval & Treat) [] OT (Eval & Treat) [] Social Work [] Dietician  [] Other:    Wound care instructions: If you smoke we ask that you refrain from smoking. Smoking inhibits wounds from healing. When taking antibiotics take the entire prescription as ordered. Do not stop taking until medication is all gone unless otherwise instructed. Exercise as tolerated.    Keep weight off wounds and reposition every 2 hours if applicable. Do not get wounds wet in the bath or shower unless otherwise instructed by your physician. If your wound is on your foot or leg, you may purchase a cast bag. Please ask at the pharmacy. Wash hands with soap and water prior to and after every dressing change. [x]Wash wounds with: Vashe wash - Apply enough Vashe to soak a piece of gauze and place on wound bed for 5-10 minutes. No need to rinse after soaking. [x]Kiah wound Topical Treatments: Do not apply lotions, creams, or ointments to the skin around the wound bed unless directed as followed:   Apply around the wound: Moisturizing lotion         [x]Wound Location: right lower leg   Apply Primary Dressing to wound: Collagen   Secondary Dressing: 4X4 gauze pad   Avoid contact of tape with skin if possible. When to change Dressing: DO NOT CHANGE        [x] Multilayer Compression Wrap:  Type: Applied on Right lower leg(s)  4 Layer Compression Wrap    Do not get leg(s) with wrap wet. If wraps become too tight call the center or completely remove the wrap. Elevate leg(s) above the level of the heart when sitting. Avoid prolonged standing in one place. Applied in Clinic on 10/24/2022  The Goal of this therapy is to reduce edema and get into long term compression garments to control venous insufficiency, lymphedema and reduce occurrence of venous ulcers    [x] Edema Control:  Apply: Compression Stocking on Left     Elevate leg(s) above the level of the heart for 30 minutes 4-5 times a day and/or when sitting. Avoid prolonged standing in one place. [x] Lymphedema Therapy:  Wear Lymphedema pumps twice a day at settings prescribed by your physician. Avoid prolonged standing in one place. Dietary:  Important dietary reminders:  1. Increase Protein intake (i.e. Lean meats, fish, eggs, legumes, and yogurt)  2. No added salt  3.  If diabetic, follow a diabetic diet and check glucose prior to meals or as instructed by your physician. Dietary Supplements(Take twice a day unless instructed otherwise):  [] Eura Barter  [] 30ml ProStat [] Foley Learn [] Ensure Max/Premier [] Other:    Your nurse  is:  Stevie Wilkerson     Electronically signed by Wes Mcelroy RN on 10/24/2022 at 10:11 AM     215 [facility] Information: Should you experience any significant changes in your wound(s) or have questions about your wound care, please contact the [address] [place] [address] at [phone]. We are open from 8:00am - 4:30p Monday, Thursday and Friday; 11:00am - 5pm on Tuesday and CLOSED Wednesday. Please give us 24-48 business hours to return your call. Call your doctor now or seek immediate medical care if:    You have symptoms of infection, such as: Increased pain, swelling, warmth, or redness. Red streaks leading from the area. Pus draining from the area. A fever.          [] Patient unable to sign Discharge Instructions given to ECF/Transportation/POA         Electronically signed by Mary Benz MD on 10/24/2022 at 10:53 AM

## 2022-10-24 NOTE — PROGRESS NOTES
Multilayer Compression Wrap   (Not Unna) Below the Knee    NAME:  Chung Jacobson  YOB: 1965  MEDICAL RECORD NUMBER:  [id number]  DATE:  10/24/2022       Removed old Multilayer wrap if present and washed leg with mild soap/water. Applied moisturizing agent to dry skin as needed. Applied primary and secondary dressing as ordered    Applied multilayered dressing below the knee to Right lower leg(s)  (4 Layer Compression Wrap ) . Instructed patient/caregiver not to remove dressing and to keep it clean and dry. Instructed patient/caregiver on complications to report to provider, such as pain, numbness in toes, heavy drainage, and slippage of dressing. Instructed patient on purpose of compression dressing and on activity and exercise recommendations.    Applied per   Guidelines    Electronically signed by Maddy Tay RN on 10/24/2022 at 10:15 AM

## 2022-10-24 NOTE — DISCHARGE INSTRUCTIONS
215 [facility] Physician Orders and Discharge Instructions  [address] [address] [address] [address] [facility]. [place]. [place]  Telephone: [phone]  NAME:  Mackenzie Wooten  YOB: 1965  MEDICAL RECORD NUMBER:  [id number]  DATE: 10/24/22     Return Appointment:  Return Appointment: With Dr. Bernadette Lechuga  in  1 [facility])  [] Return Appointment for a Wound Assessment with the nurse on:     Future Appointments   Date Time Provider Eloina Zamarripa   11/7/2022 11:45 AM [address] [place]: none  Medically necessary services: [] Skilled Nursing [] PT (Eval & Treat) [] OT (Eval & Treat) [] Social Work [] Dietician  [] Other:    Wound care instructions: If you smoke we ask that you refrain from smoking. Smoking inhibits wounds from healing. When taking antibiotics take the entire prescription as ordered. Do not stop taking until medication is all gone unless otherwise instructed. Exercise as tolerated. Keep weight off wounds and reposition every 2 hours if applicable. Do not get wounds wet in the bath or shower unless otherwise instructed by your physician. If your wound is on your foot or leg, you may purchase a cast bag. Please ask at the pharmacy. Wash hands with soap and water prior to and after every dressing change. [x]Wash wounds with: Vashe wash - Apply enough Vashe to soak a piece of gauze and place on wound bed for 5-10 minutes. No need to rinse after soaking. [x]Kiah wound Topical Treatments: Do not apply lotions, creams, or ointments to the skin around the wound bed unless directed as followed:   Apply around the wound: Moisturizing lotion         [x]Wound Location: right lower leg   Apply Primary Dressing to wound: Collagen   Secondary Dressing: 4X4 gauze pad   Avoid contact of tape with skin if possible.   When to change Dressing: DO NOT CHANGE        [x] Multilayer Compression Wrap:  Type: Applied on Right lower leg(s)  4 Layer Compression Wrap    Do not get leg(s) with wrap wet. If wraps become too tight call the center or completely remove the wrap. Elevate leg(s) above the level of the heart when sitting. Avoid prolonged standing in one place. Applied in Clinic on 10/24/2022  The Goal of this therapy is to reduce edema and get into long term compression garments to control venous insufficiency, lymphedema and reduce occurrence of venous ulcers    [x] Edema Control:  Apply: Compression Stocking on Left     Elevate leg(s) above the level of the heart for 30 minutes 4-5 times a day and/or when sitting. Avoid prolonged standing in one place. [x] Lymphedema Therapy:  Wear Lymphedema pumps twice a day at settings prescribed by your physician. Avoid prolonged standing in one place. Dietary:  Important dietary reminders:  1. Increase Protein intake (i.e. Lean meats, fish, eggs, legumes, and yogurt)  2. No added salt  3. If diabetic, follow a diabetic diet and check glucose prior to meals or as instructed by your physician. Dietary Supplements(Take twice a day unless instructed otherwise):  [] Unruly Hung  [] 30ml ProStat [] Andres Sepulveda [] Ensure Max/Premier [] Other:    Your nurse  is:  Kelsy Wolff     Electronically signed by Maddy Tay RN on 10/24/2022 at 10:11 AM     [address] [place] [address] Information: Should you experience any significant changes in your wound(s) or have questions about your wound care, please contact the [address] [place] [address] at [phone]. We are open from 8:00am - 4:30p Monday, Thursday and Friday; 11:00am - 5pm on Tuesday and CLOSED Wednesday. Please give us 24-48 business hours to return your call. Call your doctor now or seek immediate medical care if:    You have symptoms of infection, such as: Increased pain, swelling, warmth, or redness. Red streaks leading from the area. Pus draining from the area. A fever.          [] Patient unable to sign Discharge Instructions given to ECF/Transportation/POA

## 2022-10-31 ENCOUNTER — HOSPITAL ENCOUNTER (OUTPATIENT)
Dept: WOUND CARE | Age: 57
Discharge: HOME | End: 2022-10-31
Payer: MEDICAID

## 2022-10-31 VITALS
DIASTOLIC BLOOD PRESSURE: 89 MMHG | TEMPERATURE: 97.3 F | RESPIRATION RATE: 16 BRPM | HEART RATE: 63 BPM | SYSTOLIC BLOOD PRESSURE: 157 MMHG

## 2022-10-31 DIAGNOSIS — L97.919 IDIOPATHIC CHRONIC VENOUS HYPERTENSION OF RIGHT LOWER EXTREMITY WITH ULCER (HCC): ICD-10-CM

## 2022-10-31 DIAGNOSIS — I89.0 CHRONIC ACQUIRED LYMPHEDEMA: Primary | ICD-10-CM

## 2022-10-31 DIAGNOSIS — I87.311 IDIOPATHIC CHRONIC VENOUS HYPERTENSION OF RIGHT LOWER EXTREMITY WITH ULCER (HCC): ICD-10-CM

## 2022-10-31 DIAGNOSIS — Z91.199 NONCOMPLIANCE: ICD-10-CM

## 2022-10-31 PROCEDURE — 11045 DBRDMT SUBQ TISS EACH ADDL: CPT

## 2022-10-31 PROCEDURE — 11042 DBRDMT SUBQ TIS 1ST 20SQCM/<: CPT | Performed by: SPECIALIST

## 2022-10-31 PROCEDURE — 11042 DBRDMT SUBQ TIS 1ST 20SQCM/<: CPT

## 2022-10-31 PROCEDURE — 11045 DBRDMT SUBQ TISS EACH ADDL: CPT | Performed by: SPECIALIST

## 2022-10-31 PROCEDURE — 6370000000 HC RX 637 (ALT 250 FOR IP): Performed by: SPECIALIST

## 2022-10-31 PROCEDURE — 29581 APPL MULTLAYER CMPRN SYS LEG: CPT

## 2022-10-31 RX ORDER — LIDOCAINE HYDROCHLORIDE 20 MG/ML
JELLY TOPICAL ONCE
Status: CANCELLED | OUTPATIENT
Start: 2022-10-31 | End: 2022-10-31

## 2022-10-31 RX ORDER — CLOBETASOL PROPIONATE 0.5 MG/G
OINTMENT TOPICAL ONCE
Status: CANCELLED | OUTPATIENT
Start: 2022-10-31 | End: 2022-10-31

## 2022-10-31 RX ORDER — LIDOCAINE 40 MG/G
CREAM TOPICAL ONCE
Status: CANCELLED | OUTPATIENT
Start: 2022-10-31 | End: 2022-10-31

## 2022-10-31 RX ORDER — BACITRACIN, NEOMYCIN, POLYMYXIN B 400; 3.5; 5 [USP'U]/G; MG/G; [USP'U]/G
OINTMENT TOPICAL ONCE
Status: CANCELLED | OUTPATIENT
Start: 2022-10-31 | End: 2022-10-31

## 2022-10-31 RX ORDER — LIDOCAINE HYDROCHLORIDE 40 MG/ML
SOLUTION TOPICAL ONCE
Status: COMPLETED | OUTPATIENT
Start: 2022-10-31 | End: 2022-10-31

## 2022-10-31 RX ORDER — BETAMETHASONE DIPROPIONATE 0.05 %
OINTMENT (GRAM) TOPICAL ONCE
Status: CANCELLED | OUTPATIENT
Start: 2022-10-31 | End: 2022-10-31

## 2022-10-31 RX ORDER — DOXYCYCLINE HYCLATE 100 MG
100 TABLET ORAL 2 TIMES DAILY
Qty: 60 TABLET | Refills: 0 | Status: SHIPPED | OUTPATIENT
Start: 2022-10-31 | End: 2022-11-30

## 2022-10-31 RX ORDER — LIDOCAINE HYDROCHLORIDE 40 MG/ML
SOLUTION TOPICAL ONCE
Status: CANCELLED | OUTPATIENT
Start: 2022-10-31 | End: 2022-10-31

## 2022-10-31 RX ORDER — LIDOCAINE 50 MG/G
OINTMENT TOPICAL ONCE
Status: CANCELLED | OUTPATIENT
Start: 2022-10-31 | End: 2022-10-31

## 2022-10-31 RX ORDER — GINSENG 100 MG
CAPSULE ORAL ONCE
Status: CANCELLED | OUTPATIENT
Start: 2022-10-31 | End: 2022-10-31

## 2022-10-31 RX ORDER — BACITRACIN ZINC AND POLYMYXIN B SULFATE 500; 1000 [USP'U]/G; [USP'U]/G
OINTMENT TOPICAL ONCE
Status: CANCELLED | OUTPATIENT
Start: 2022-10-31 | End: 2022-10-31

## 2022-10-31 RX ORDER — GENTAMICIN SULFATE 1 MG/G
OINTMENT TOPICAL ONCE
Status: CANCELLED | OUTPATIENT
Start: 2022-10-31 | End: 2022-10-31

## 2022-10-31 RX ADMIN — LIDOCAINE HYDROCHLORIDE: 40 SOLUTION TOPICAL at 11:28

## 2022-10-31 ASSESSMENT — PAIN DESCRIPTION - ORIENTATION: ORIENTATION: RIGHT

## 2022-10-31 ASSESSMENT — PAIN SCALES - GENERAL: PAINLEVEL_OUTOF10: 2

## 2022-10-31 ASSESSMENT — PAIN DESCRIPTION - DESCRIPTORS: DESCRIPTORS: BURNING

## 2022-10-31 ASSESSMENT — PAIN DESCRIPTION - PAIN TYPE: TYPE: CHRONIC PAIN

## 2022-10-31 ASSESSMENT — PAIN DESCRIPTION - FREQUENCY: FREQUENCY: CONTINUOUS

## 2022-10-31 ASSESSMENT — PAIN DESCRIPTION - LOCATION: LOCATION: LEG

## 2022-10-31 NOTE — DISCHARGE INSTRUCTIONS
215 [facility] Physician Orders and Discharge Instructions  [address] [address] [address] [address] [facility]. [facility]. [place]  Telephone: [phone]  NAME:  Faraz Ellison  YOB: 1965  MEDICAL RECORD NUMBER:  [id number]  DATE: 10/31/22     Return Appointment:  Return Appointment: With Dr. Harvey Salgado  in  1 [facility])  [] Return Appointment for a Wound Assessment with the nurse on:     Future Appointments   Date Time Provider Eloina Zamarripa   11/7/2022 11:45 AM [address] [place]: none  Medically necessary services: [] Skilled Nursing [] PT (Eval & Treat) [] OT (Eval & Treat) [] Social Work [] Dietician  [] Other:    Wound care instructions: If you smoke we ask that you refrain from smoking. Smoking inhibits wounds from healing. When taking antibiotics take the entire prescription as ordered. Do not stop taking until medication is all gone unless otherwise instructed. Exercise as tolerated. Keep weight off wounds and reposition every 2 hours if applicable. Do not get wounds wet in the bath or shower unless otherwise instructed by your physician. If your wound is on your foot or leg, you may purchase a cast bag. Please ask at the pharmacy. Wash hands with soap and water prior to and after every dressing change. [x]Wash wounds with: Vashe wash - Apply enough Vashe to soak a piece of gauze and place on wound bed for 5-10 minutes. No need to rinse after soaking. [x]Kiah wound Topical Treatments: Do not apply lotions, creams, or ointments to the skin around the wound bed unless directed as followed:   Apply around the wound: Moisturizing lotion         [x]Wound Location: right lower leg   Apply Primary Dressing to wound:  polymem silver  Secondary Dressing: 4X4 gauze pad   Avoid contact of tape with skin if possible.   When to change Dressing: DO NOT CHANGE      [x] Multilayer Compression Wrap:  Type: Applied on Right lower leg(s)  4 Layer Compression Wrap    Do not get leg(s) with wrap wet. If wraps become too tight call the center or completely remove the wrap. Elevate leg(s) above the level of the heart when sitting. Avoid prolonged standing in one place. Applied in Clinic on 10/31/2022  The Goal of this therapy is to reduce edema and get into long term compression garments to control venous insufficiency, lymphedema and reduce occurrence of venous ulcers      [x] Lymphedema Therapy:  Wear Lymphedema pumps twice a day at settings prescribed by your physician. Avoid prolonged standing in one place. Dietary:  Important dietary reminders:  1. Increase Protein intake (i.e. Lean meats, fish, eggs, legumes, and yogurt)  2. No added salt  3. If diabetic, follow a diabetic diet and check glucose prior to meals or as instructed by your physician. Dietary Supplements(Take twice a day unless instructed otherwise):  [] Jimy Clement  [] 30ml ProStat [] Climmie Clemente [] Ensure Max/Premier [] Other:    Your nurse  is:  Jacquelin Rose     Electronically signed by Madina Haynes RN on 10/31/2022 at 12:06 PM     [address]: Should you experience any significant changes in your wound(s) or have questions about your wound care, please contact the [address] [place] [address] at [phone]. We are open from 8:00am - 4:30p Monday, Thursday and Friday; 11:00am - 5pm on Tuesday and CLOSED Wednesday. Please give us 24-48 business hours to return your call. Call your doctor now or seek immediate medical care if:    You have symptoms of infection, such as: Increased pain, swelling, warmth, or redness. Red streaks leading from the area. Pus draining from the area. A fever.          [] Patient unable to sign Discharge Instructions given to ECF/Transportation/POA

## 2022-10-31 NOTE — PROGRESS NOTES
[address]  Progress Note and Procedure Note      Diogenes Vick  MEDICAL RECORD NUMBER:  [id number]  AGE: 62 y.o. GENDER: male  : 1965  EPISODE DATE:  10/31/2022    Subjective:     Chief Complaint   Patient presents with    Wound Check         HISTORY of PRESENT ILLNESS HPI     Diogenes Vick is a 62 y.o. male who presents today for wound/ulcer evaluation. History of Wound Context: Well-known to the wound care center having been treated for years for chronic venous insufficiency with open wounds and significant lymphedema. He has been lost to follow-up for almost 1 year. He returns today with multiple open wounds of the right lower extremity with massive edema. He states he has been utilizing his lymphedema pumps daily. There has been no breakthrough drainage from the compression wraps. He states wrap slipped this past week.   Wound/Ulcer Pain Timing/Severity: none  Quality of pain: N/A  Severity:  0 / 10   Modifying Factors: None  Associated Signs/Symptoms: edema and drainage    Ulcer Identification:  Ulcer Type: venous    Contributing Factors: edema, venous stasis, lymphedema, obesity, and anticoagulation therapy    Acute Wound: N/A not an acute wound    PAST MEDICAL HISTORY        Diagnosis Date    DVT (deep venous thrombosis) (HCC)     Hx of blood clots     Pain and swelling of right lower leg        PAST SURGICAL HISTORY    Past Surgical History:   Procedure Laterality Date    BALLOON ANGIOPLASTY, ARTERY Right 2017    at [facility], ESOPHAGUS      SKIN SPLIT GRAFT Right        FAMILY HISTORY    Family History   Problem Relation Age of Onset    Diabetes Mother     Heart Attack Father        SOCIAL HISTORY    Social History     Tobacco Use    Smoking status: Never    Smokeless tobacco: Never   Vaping Use    Vaping Use: Never used   Substance Use Topics    Alcohol use: No    Drug use: No       ALLERGIES    No Known Allergies    MEDICATIONS    Current Outpatient Medications on File Prior to Encounter   Medication Sig Dispense Refill    ibuprofen (ADVIL;MOTRIN) 200 MG tablet Take 200 mg by mouth every 6 hours as needed for Pain      triamcinolone (KENALOG) 0.1 % ointment Apply topically 2 times daily (Patient not taking: Reported on 10/24/2022) 453.6 g 1    warfarin (COUMADIN) 5 MG tablet TAKE ONE AND ONE-HALF TABLETS BY MOUTH DAILY EXCEPT TAKE 2 TABLETS DAILY ON FRIDAY 180 tablet 1    Compression Stockings MISC by Does not apply route Strength 30-40mmHg  Style: Other pull up compression stockings  Extremity: bilateral  Donning aid recommended: No 1 each 0     No current facility-administered medications on file prior to encounter. REVIEW OF SYSTEMS  Review of Systems    Pertinent items are noted in HPI. Objective:      BP (!) 157/89   Pulse 63   Temp 97.3 °F (36.3 °C) (Temporal)   Resp 16     Wt Readings from Last 3 Encounters:   04/25/22 251 lb 6.4 oz (114 kg)   04/18/22 251 lb 6.4 oz (114 kg)   10/27/21 244 lb (110.7 kg)       PHYSICAL EXAM    Extremities: no cyanosis, no clubbing, 3 + edema-  right lower extremity, and and 3 full-thickness wounds containing preponderance of granulation tissue with minimal fibrin ,slough,involving the medial, anterior, posterior right knee  Periwound is dry  Assessment:      1. Chronic acquired lymphedema    2. Idiopathic chronic venous hypertension of right lower extremity with ulcer ([address]    3. Noncompliance         Procedure Note  Indications:  Based on my examination of this patient's wound(s) today, sharp excision is required to promote healing and evaluate the extent healing. Performed by: Constantine Shaver MD    Consent obtained? Yes    Time out taken: Yes    Pain Control: Anesthetic: 4% Lidocaine Liquid Topical     Debridement:Excisional Debridement    Using curette the wound was sharply debrided    down through and including the removal of  epidermis, dermis, and subcutaneous tissue.     Devitalized Tissue Debrided:  fibrin, biofilm, and slough      Pre Debridement Measurements:  Are located in the Wound Documentation Flow Sheet   Wound #: 1, 2, and 3     Post  Debridement Measurements:  Wound 07/25/22 Leg Right; Lower;Medial #1 (Active)   Wound Image   10/03/22 1116   Wound Etiology Venous 07/25/22 1040   Wound Cleansed Cleansed with saline 10/31/22 1113   Dressing/Treatment Collagen 10/24/22 1008   Wound Length (cm) 3.4 cm 10/31/22 1113   Wound Width (cm) 3 cm 10/31/22 1113   Wound Depth (cm) 0.1 cm 10/31/22 1113   Wound Surface Area (cm^2) 10.2 cm^2 10/31/22 1113   Change in Wound Size % (l*w) 56.87 10/31/22 1113   Wound Volume (cm^3) 1.02 cm^3 10/31/22 1113   Wound Healing % 57 10/31/22 1113   Post-Procedure Length (cm) 3.5 cm 10/31/22 1203   Post-Procedure Width (cm) 3.1 cm 10/31/22 1203   Post-Procedure Depth (cm) 0.3 cm 10/31/22 1203   Post-Procedure Surface Area (cm^2) 10.85 cm^2 10/31/22 1203   Post-Procedure Volume (cm^3) 3.255 cm^3 10/31/22 1203   Distance Tunneling (cm) 0 cm 10/31/22 1113   Undermining Maxium Distance (cm) 0 10/31/22 1113   Wound Assessment Devitalized tissue;Granulation tissue; Purple/maroon 10/31/22 1113   Drainage Amount Small 10/31/22 1113   Drainage Description Serosanguinous;Brown 10/31/22 1113   Odor Mild 10/31/22 1113   Kiah-wound Assessment Erosion;Fragile 10/31/22 1113   Margins Defined edges 10/31/22 1113   Wound Thickness Description not for Pressure Injury Full thickness 10/31/22 1113   Number of days: 98       Wound 07/25/22 Leg Right; Anterior; Lower #2 (Active)   Wound Image   10/03/22 1116   Wound Etiology Venous 07/25/22 1040   Wound Cleansed Cleansed with saline 10/31/22 1113   Dressing/Treatment Collagen 10/24/22 1008   Wound Length (cm) 2 cm 10/31/22 1113   Wound Width (cm) 5 cm 10/31/22 1113   Wound Depth (cm) 0.1 cm 10/31/22 1113   Wound Surface Area (cm^2) 10 cm^2 10/31/22 1113   Change in Wound Size % (l*w) 78.07 10/31/22 1113   Wound Volume (cm^3) 1 cm^3 10/31/22 1113 Wound Healing % 78 10/31/22 1113   Post-Procedure Length (cm) 2.1 cm 10/31/22 1203   Post-Procedure Width (cm) 5.1 cm 10/31/22 1203   Post-Procedure Depth (cm) 0.3 cm 10/31/22 1203   Post-Procedure Surface Area (cm^2) 10.71 cm^2 10/31/22 1203   Post-Procedure Volume (cm^3) 3.213 cm^3 10/31/22 1203   Distance Tunneling (cm) 0 cm 10/31/22 1113   Undermining Maxium Distance (cm) 0 10/31/22 1113   Wound Assessment Fibrin;Pink/red 10/31/22 1113   Drainage Amount Moderate 10/31/22 1113   Drainage Description Serosanguinous;Brown 10/31/22 1113   Odor Mild 10/31/22 1113   Kiah-wound Assessment Erosion 10/31/22 1113   Margins Defined edges 10/31/22 1113   Wound Thickness Description not for Pressure Injury Full thickness 10/31/22 1113   Number of days: 98       Wound 07/25/22 Leg Right;Posterior; Lower #3 (Active)   Wound Image   10/03/22 1116   Wound Etiology Venous 07/25/22 1040   Wound Cleansed Cleansed with saline 10/31/22 1113   Dressing/Treatment Collagen 10/24/22 1008   Wound Length (cm) 2 cm 10/31/22 1113   Wound Width (cm) 3 cm 10/31/22 1113   Wound Depth (cm) 0.1 cm 10/31/22 1113   Wound Surface Area (cm^2) 6 cm^2 10/31/22 1113   Change in Wound Size % (l*w) 86.36 10/31/22 1113   Wound Volume (cm^3) 0.6 cm^3 10/31/22 1113   Wound Healing % 86 10/31/22 1113   Post-Procedure Length (cm) 2.1 cm 10/31/22 1203   Post-Procedure Width (cm) 3.1 cm 10/31/22 1203   Post-Procedure Depth (cm) 0.3 cm 10/31/22 1203   Post-Procedure Surface Area (cm^2) 6.51 cm^2 10/31/22 1203   Post-Procedure Volume (cm^3) 1. 953 cm^3 10/31/22 1203   Distance Tunneling (cm) 0 cm 10/31/22 1113   Undermining Maxium Distance (cm) 0 10/31/22 1113   Wound Assessment Pink/red;Fibrin 10/31/22 1113   Drainage Amount Moderate 10/31/22 1113   Drainage Description Serosanguinous;Brown 10/31/22 1113   Odor Mild 10/31/22 1113   Kiah-wound Assessment Intact; Maceration 10/31/22 1113   Margins Defined edges 10/31/22 1113   Wound Thickness Description not for by your physician. If your wound is on your foot or leg, you may purchase a cast bag. Please ask at the pharmacy. Wash hands with soap and water prior to and after every dressing change. [x]Wash wounds with: Vashe wash - Apply enough Vashe to soak a piece of gauze and place on wound bed for 5-10 minutes. No need to rinse after soaking. [x]Kiah wound Topical Treatments: Do not apply lotions, creams, or ointments to the skin around the wound bed unless directed as followed:   Apply around the wound: Moisturizing lotion         [x]Wound Location: right lower leg   Apply Primary Dressing to wound:  polymem silver  Secondary Dressing: 4X4 gauze pad   Avoid contact of tape with skin if possible. When to change Dressing: DO NOT CHANGE      [x] Multilayer Compression Wrap:  Type: Applied on Right lower leg(s)  4 Layer Compression Wrap    Do not get leg(s) with wrap wet. If wraps become too tight call the center or completely remove the wrap. Elevate leg(s) above the level of the heart when sitting. Avoid prolonged standing in one place. Applied in Clinic on 10/31/2022  The Goal of this therapy is to reduce edema and get into long term compression garments to control venous insufficiency, lymphedema and reduce occurrence of venous ulcers      [x] Lymphedema Therapy:  Wear Lymphedema pumps twice a day at settings prescribed by your physician. Avoid prolonged standing in one place. Dietary:  Important dietary reminders:  1. Increase Protein intake (i.e. Lean meats, fish, eggs, legumes, and yogurt)  2. No added salt  3. If diabetic, follow a diabetic diet and check glucose prior to meals or as instructed by your physician.     Dietary Supplements(Take twice a day unless instructed otherwise):  [] Unruly Hung  [] 30ml ProStat [] Andres Sepulveda [] Ensure Max/Premier [] Other:    Your nurse  is:  Jacquelin Rose     Electronically signed by Maddy Tay RN on 10/31/2022 at 12:06 PM     [facility] Information: Should you experience any significant changes in your wound(s) or have questions about your wound care, please contact the [address] [place] [address] at [phone]. We are open from 8:00am - 4:30p Monday, Thursday and Friday; 11:00am - 5pm on Tuesday and CLOSED Wednesday. Please give us 24-48 business hours to return your call. Call your doctor now or seek immediate medical care if:    You have symptoms of infection, such as: Increased pain, swelling, warmth, or redness. Red streaks leading from the area. Pus draining from the area. A fever.          [] Patient unable to sign Discharge Instructions given to ECF/Transportation/POA         Electronically signed by Sheila Estrella MD on 10/31/2022 at 1:45 PM

## 2022-11-07 ENCOUNTER — ANTI-COAG VISIT (OUTPATIENT)
Dept: PHARMACY | Age: 57
End: 2022-11-07
Payer: MEDICAID

## 2022-11-07 ENCOUNTER — HOSPITAL ENCOUNTER (OUTPATIENT)
Dept: WOUND CARE | Age: 57
Discharge: HOME | End: 2022-11-07
Payer: MEDICAID

## 2022-11-07 VITALS — TEMPERATURE: 97 F | DIASTOLIC BLOOD PRESSURE: 77 MMHG | SYSTOLIC BLOOD PRESSURE: 173 MMHG | HEART RATE: 66 BPM

## 2022-11-07 DIAGNOSIS — L97.919 IDIOPATHIC CHRONIC VENOUS HYPERTENSION OF RIGHT LOWER EXTREMITY WITH ULCER (HCC): ICD-10-CM

## 2022-11-07 DIAGNOSIS — I82.5Z1 CHRONIC VENOUS EMBOLISM AND THROMBOSIS OF DEEP VESSELS OF DISTAL END OF RIGHT LOWER EXTREMITY (HCC): Primary | ICD-10-CM

## 2022-11-07 DIAGNOSIS — I87.311 IDIOPATHIC CHRONIC VENOUS HYPERTENSION OF RIGHT LOWER EXTREMITY WITH ULCER (HCC): ICD-10-CM

## 2022-11-07 DIAGNOSIS — I89.0 CHRONIC ACQUIRED LYMPHEDEMA: Primary | ICD-10-CM

## 2022-11-07 DIAGNOSIS — Z91.199 NONCOMPLIANCE: ICD-10-CM

## 2022-11-07 LAB — INTERNATIONAL NORMALIZATION RATIO, POC: 2.3

## 2022-11-07 PROCEDURE — 6370000000 HC RX 637 (ALT 250 FOR IP): Performed by: SPECIALIST

## 2022-11-07 PROCEDURE — 11045 DBRDMT SUBQ TISS EACH ADDL: CPT

## 2022-11-07 PROCEDURE — 99211 OFF/OP EST MAY X REQ PHY/QHP: CPT | Performed by: PHARMACIST

## 2022-11-07 PROCEDURE — 11042 DBRDMT SUBQ TIS 1ST 20SQCM/<: CPT

## 2022-11-07 PROCEDURE — 11042 DBRDMT SUBQ TIS 1ST 20SQCM/<: CPT | Performed by: SPECIALIST

## 2022-11-07 PROCEDURE — 29581 APPL MULTLAYER CMPRN SYS LEG: CPT

## 2022-11-07 PROCEDURE — 85610 PROTHROMBIN TIME: CPT | Performed by: PHARMACIST

## 2022-11-07 PROCEDURE — 11045 DBRDMT SUBQ TISS EACH ADDL: CPT | Performed by: SPECIALIST

## 2022-11-07 RX ORDER — LIDOCAINE HYDROCHLORIDE 20 MG/ML
JELLY TOPICAL ONCE
Status: CANCELLED | OUTPATIENT
Start: 2022-11-07 | End: 2022-11-07

## 2022-11-07 RX ORDER — LIDOCAINE 50 MG/G
OINTMENT TOPICAL ONCE
Status: CANCELLED | OUTPATIENT
Start: 2022-11-07 | End: 2022-11-07

## 2022-11-07 RX ORDER — BACITRACIN, NEOMYCIN, POLYMYXIN B 400; 3.5; 5 [USP'U]/G; MG/G; [USP'U]/G
OINTMENT TOPICAL ONCE
Status: CANCELLED | OUTPATIENT
Start: 2022-11-07 | End: 2022-11-07

## 2022-11-07 RX ORDER — BETAMETHASONE DIPROPIONATE 0.05 %
OINTMENT (GRAM) TOPICAL ONCE
Status: CANCELLED | OUTPATIENT
Start: 2022-11-07 | End: 2022-11-07

## 2022-11-07 RX ORDER — LIDOCAINE HYDROCHLORIDE 40 MG/ML
SOLUTION TOPICAL ONCE
Status: CANCELLED | OUTPATIENT
Start: 2022-11-07 | End: 2022-11-07

## 2022-11-07 RX ORDER — BACITRACIN ZINC AND POLYMYXIN B SULFATE 500; 1000 [USP'U]/G; [USP'U]/G
OINTMENT TOPICAL ONCE
Status: CANCELLED | OUTPATIENT
Start: 2022-11-07 | End: 2022-11-07

## 2022-11-07 RX ORDER — LIDOCAINE HYDROCHLORIDE 40 MG/ML
SOLUTION TOPICAL ONCE
Status: COMPLETED | OUTPATIENT
Start: 2022-11-07 | End: 2022-11-07

## 2022-11-07 RX ORDER — LIDOCAINE 40 MG/G
CREAM TOPICAL ONCE
Status: CANCELLED | OUTPATIENT
Start: 2022-11-07 | End: 2022-11-07

## 2022-11-07 RX ORDER — CLOBETASOL PROPIONATE 0.5 MG/G
OINTMENT TOPICAL ONCE
Status: CANCELLED | OUTPATIENT
Start: 2022-11-07 | End: 2022-11-07

## 2022-11-07 RX ORDER — GINSENG 100 MG
CAPSULE ORAL ONCE
Status: CANCELLED | OUTPATIENT
Start: 2022-11-07 | End: 2022-11-07

## 2022-11-07 RX ORDER — GENTAMICIN SULFATE 1 MG/G
OINTMENT TOPICAL ONCE
Status: CANCELLED | OUTPATIENT
Start: 2022-11-07 | End: 2022-11-07

## 2022-11-07 RX ADMIN — LIDOCAINE HYDROCHLORIDE: 40 SOLUTION TOPICAL at 12:13

## 2022-11-07 ASSESSMENT — PAIN DESCRIPTION - PAIN TYPE: TYPE: CHRONIC PAIN

## 2022-11-07 ASSESSMENT — PAIN SCALES - GENERAL: PAINLEVEL_OUTOF10: 2

## 2022-11-07 ASSESSMENT — PAIN DESCRIPTION - ORIENTATION: ORIENTATION: RIGHT

## 2022-11-07 ASSESSMENT — PAIN DESCRIPTION - LOCATION: LOCATION: LEG

## 2022-11-07 ASSESSMENT — PAIN - FUNCTIONAL ASSESSMENT: PAIN_FUNCTIONAL_ASSESSMENT: ACTIVITIES ARE NOT PREVENTED

## 2022-11-07 ASSESSMENT — PAIN DESCRIPTION - DESCRIPTORS: DESCRIPTORS: BURNING

## 2022-11-07 NOTE — DISCHARGE INSTRUCTIONS
[facility] Physician Orders and Discharge Instructions  [address] [address] [address] [address] [facility]. [facility]. [place]  Telephone: [phone]  NAME:  David Clemente  YOB: 1965  MEDICAL RECORD NUMBER:  [id number]  DATE: 11/7/22     Return Appointment:  Return Appointment: With Dr. Rosa Astudillo  in  [facility])  [] Return Appointment for a Wound Assessment with the nurse on:     Future Appointments   Date Time Provider Eloina Zamarripa   12/5/2022 10:30 AM [address] Eloued: none  Medically necessary services: [] Skilled Nursing [] PT (Eval & Treat) [] OT (Eval & Treat) [] Social Work [] Dietician  [] Other:    Wound care instructions: If you smoke we ask that you refrain from smoking. Smoking inhibits wounds from healing. When taking antibiotics take the entire prescription as ordered. Do not stop taking until medication is all gone unless otherwise instructed. Exercise as tolerated. Keep weight off wounds and reposition every 2 hours if applicable. Do not get wounds wet in the bath or shower unless otherwise instructed by your physician. If your wound is on your foot or leg, you may purchase a cast bag. Please ask at the pharmacy. Wash hands with soap and water prior to and after every dressing change. [x]Wash wounds with: Vashe wash - Apply enough Vashe to soak a piece of gauze and place on wound bed for 5-10 minutes. No need to rinse after soaking. [x]Kiah wound Topical Treatments: Do not apply lotions, creams, or ointments to the skin around the wound bed unless directed as followed:   Apply around the wound: Moisturizing lotion         [x]Wound Location: right lower leg   Apply Primary Dressing to wound:  polymem silver  Secondary Dressing: None   Avoid contact of tape with skin if possible.   When to change Dressing:       [x] Multilayer Compression Wrap:  Type: Applied on Right lower leg(s)  4 Layer Compression Wrap    Do not get leg(s) with wrap wet. If wraps become too tight call the center or completely remove the wrap. Elevate leg(s) above the level of the heart when sitting. Avoid prolonged standing in one place. Applied in Clinic on 11/7/2022  The Goal of this therapy is to reduce edema and get into long term compression garments to control venous insufficiency, lymphedema and reduce occurrence of venous ulcers    [x] Edema Control:  Apply: Compression Stocking on the Left leg - 30-40mmHG    Elevate leg(s) above the level of the heart for 30 minutes 4-5 times a day and/or when sitting. Avoid prolonged standing in one place. [x] Lymphedema Therapy:  Wear Lymphedema pumps twice a day at settings prescribed by your physician. Avoid prolonged standing in one place. Dietary:  Important dietary reminders:  1. Increase Protein intake (i.e. Lean meats, fish, eggs, legumes, and yogurt)  2. No added salt  3. If diabetic, follow a diabetic diet and check glucose prior to meals or as instructed by your physician. Dietary Supplements(Take twice a day unless instructed otherwise):  [] Jimy Clement  [] 30ml ProStat [] Climmie Clemente [] Ensure Max/Premier [] Other:    Your nurse  is:  Anne Sender     Electronically signed by Madina Haynes RN on 11/7/2022 at 12:24 PM     [facility] Information: Should you experience any significant changes in your wound(s) or have questions about your wound care, please contact the [address] [place] [address] at [phone]. We are open from 8:00am - 4:30p Monday, Thursday and Friday; 11:00am - 5pm on Tuesday and CLOSED Wednesday. Please give us 24-48 business hours to return your call. Call your doctor now or seek immediate medical care if:    You have symptoms of infection, such as: Increased pain, swelling, warmth, or redness. Red streaks leading from the area. Pus draining from the area. A fever.          [] Patient unable to sign Discharge Instructions given to ECF/Transportation/POA

## 2022-11-07 NOTE — PROGRESS NOTES
Multilayer Compression Wrap   (Not Unna) Below the Knee    NAME:  Maki Messina  YOB: 1965  MEDICAL RECORD NUMBER:  [id number]  DATE:  11/7/2022       Removed old Multilayer wrap if present and washed leg with mild soap/water. Applied moisturizing agent to dry skin as needed. Applied primary and secondary dressing as ordered    Applied multilayered dressing below the knee to Right lower leg(s)  (4 Layer Compression Wrap ) . Instructed patient/caregiver not to remove dressing and to keep it clean and dry. Instructed patient/caregiver on complications to report to provider, such as pain, numbness in toes, heavy drainage, and slippage of dressing. Instructed patient on purpose of compression dressing and on activity and exercise recommendations.    Applied per   Guidelines    Electronically signed by Keke Ahn RN on 11/7/2022 at 12:33 PM

## 2022-11-07 NOTE — PROGRESS NOTES
[address] [facility] [place]  Progress Note and Procedure Note      Darren Simon  MEDICAL RECORD NUMBER:  [id number]  AGE: 62 y.o. GENDER: male  : 1965  EPISODE DATE:  2022    Subjective:     Chief Complaint   Patient presents with    Wound Check     Right lower leg           HISTORY of PRESENT ILLNESS HPI     Darren Simon is a 62 y.o. male who presents today for wound/ulcer evaluation. History of Wound Context: Well-known to the wound care center having been treated for years for chronic venous insufficiency with open wounds and significant lymphedema. He has been lost to follow-up for almost 1 year. He returns today with multiple open wounds of the right lower extremity with massive edema. He states he has been utilizing his lymphedema pumps daily. There has been no breakthrough drainage from the compression wraps.   He has resumed his doxycycline therapy  Wound/Ulcer Pain Timing/Severity: none  Quality of pain: N/A  Severity:  0 / 10   Modifying Factors: None  Associated Signs/Symptoms: edema and drainage    Ulcer Identification:  Ulcer Type: venous    Contributing Factors: edema, venous stasis, lymphedema, obesity, and anticoagulation therapy    Acute Wound: N/A not an acute wound    PAST MEDICAL HISTORY        Diagnosis Date    DVT (deep venous thrombosis) (HCC)     Hx of blood clots     Pain and swelling of right lower leg        PAST SURGICAL HISTORY    Past Surgical History:   Procedure Laterality Date    BALLOON ANGIOPLASTY, ARTERY Right 2017    at [facility] [place], ESOPHAGUS      SKIN SPLIT GRAFT Right        FAMILY HISTORY    Family History   Problem Relation Age of Onset    Diabetes Mother     Heart Attack Father        SOCIAL HISTORY    Social History     Tobacco Use    Smoking status: Never    Smokeless tobacco: Never   Vaping Use    Vaping Use: Never used   Substance Use Topics    Alcohol use: No    Drug use: No       ALLERGIES    No Known Allergies    MEDICATIONS    Current Outpatient Medications on File Prior to Encounter   Medication Sig Dispense Refill    doxycycline hyclate (VIBRA-TABS) 100 MG tablet Take 1 tablet by mouth 2 times daily 60 tablet 0    ibuprofen (ADVIL;MOTRIN) 200 MG tablet Take 200 mg by mouth every 6 hours as needed for Pain      triamcinolone (KENALOG) 0.1 % ointment Apply topically 2 times daily (Patient not taking: Reported on 10/24/2022) 453.6 g 1    warfarin (COUMADIN) 5 MG tablet TAKE ONE AND ONE-HALF TABLETS BY MOUTH DAILY EXCEPT TAKE 2 TABLETS DAILY ON FRIDAY 180 tablet 1    Compression Stockings MISC by Does not apply route Strength 30-40mmHg  Style: Other pull up compression stockings  Extremity: bilateral  Donning aid recommended: No 1 each 0     No current facility-administered medications on file prior to encounter. REVIEW OF SYSTEMS  Review of Systems    Pertinent items are noted in HPI. Objective:      BP (!) 173/77   Pulse 66   Temp 97 °F (36.1 °C) (Temporal)     Wt Readings from Last 3 Encounters:   04/25/22 251 lb 6.4 oz (114 kg)   04/18/22 251 lb 6.4 oz (114 kg)   10/27/21 244 lb (110.7 kg)       PHYSICAL EXAM    General Appearance: alert and oriented to person, place and time, well-developed and well-nourished, in no acute distress  Extremities: no cyanosis, no clubbing, 3 + edema-  right lower extremity, and knee full-thickness wounds containing granulation tissue with minimal fibrin and slough involving the medial, anterior and posterior right knee. Periwound is dry and intact    Assessment:      1. Chronic acquired lymphedema    2. Idiopathic chronic venous hypertension of right lower extremity with ulcer ([address]    3. Noncompliance         Procedure Note  Indications:  Based on my examination of this patient's wound(s) today, sharp excision is required to promote healing and evaluate the extent healing. Performed by: Guanaco Smith MD    Consent obtained?  Yes    Time out taken: Yes    Pain Control: Anesthetic: 4% Lidocaine Liquid Topical     Debridement:Excisional Debridement    Using curette the wound was sharply debrided    down through and including the removal of  epidermis, dermis, and subcutaneous tissue. Devitalized Tissue Debrided:  fibrin, biofilm, and slough      Pre Debridement Measurements:  Are located in the Wound Documentation Flow Sheet   Wound #: 1, 2, and 3     Post  Debridement Measurements:  Wound 07/25/22 Leg Right; Lower;Medial #1 (Active)   Wound Image   10/03/22 1116   Wound Etiology Venous 07/25/22 1040   Wound Cleansed Cleansed with saline 11/07/22 1201   Dressing/Treatment Collagen 10/24/22 1008   Wound Length (cm) 3 cm 11/07/22 1201   Wound Width (cm) 3.4 cm 11/07/22 1201   Wound Depth (cm) 0.1 cm 11/07/22 1201   Wound Surface Area (cm^2) 10.2 cm^2 11/07/22 1201   Change in Wound Size % (l*w) 56.87 11/07/22 1201   Wound Volume (cm^3) 1.02 cm^3 11/07/22 1201   Wound Healing % 57 11/07/22 1201   Post-Procedure Length (cm) 3.1 cm 11/07/22 1221   Post-Procedure Width (cm) 3.5 cm 11/07/22 1221   Post-Procedure Depth (cm) 0.3 cm 11/07/22 1221   Post-Procedure Surface Area (cm^2) 10.85 cm^2 11/07/22 1221   Post-Procedure Volume (cm^3) 3.255 cm^3 11/07/22 1221   Distance Tunneling (cm) 0 cm 11/07/22 1201   Undermining Maxium Distance (cm) 0 11/07/22 1201   Wound Assessment Granulation tissue;Pink/red;Fibrin 11/07/22 1201   Drainage Amount Moderate 11/07/22 1201   Drainage Description Serosanguinous;Brown 11/07/22 1201   Odor Mild 11/07/22 1201   Kiah-wound Assessment Fragile; Maceration 11/07/22 1201   Margins Defined edges 11/07/22 1201   Wound Thickness Description not for Pressure Injury Full thickness 11/07/22 1201   Number of days: 105       Wound 07/25/22 Leg Right; Anterior; Lower #2 (Active)   Wound Image   10/03/22 1116   Wound Etiology Venous 07/25/22 1040   Wound Cleansed Cleansed with saline 11/07/22 1201   Dressing/Treatment Collagen 10/24/22 1008   Wound Length (cm) 2 cm 11/07/22 1201   Wound Width (cm) 4.9 cm 11/07/22 1201   Wound Depth (cm) 0.1 cm 11/07/22 1201   Wound Surface Area (cm^2) 9.8 cm^2 11/07/22 1201   Change in Wound Size % (l*w) 78.51 11/07/22 1201   Wound Volume (cm^3) 0.98 cm^3 11/07/22 1201   Wound Healing % 79 11/07/22 1201   Post-Procedure Length (cm) 2.1 cm 11/07/22 1221   Post-Procedure Width (cm) 5 cm 11/07/22 1221   Post-Procedure Depth (cm) 0.3 cm 11/07/22 1221   Post-Procedure Surface Area (cm^2) 10.5 cm^2 11/07/22 1221   Post-Procedure Volume (cm^3) 3.15 cm^3 11/07/22 1221   Distance Tunneling (cm) 0 cm 11/07/22 1201   Undermining Maxium Distance (cm) 0 11/07/22 1201   Wound Assessment Fibrin;Pink/red 11/07/22 1201   Drainage Amount Moderate 11/07/22 1201   Drainage Description Serosanguinous;Brown 11/07/22 1201   Odor Mild 11/07/22 1201   Kiah-wound Assessment Maceration 11/07/22 1201   Margins Defined edges 11/07/22 1201   Wound Thickness Description not for Pressure Injury Full thickness 11/07/22 1201   Number of days: 105       Wound 07/25/22 Leg Right;Posterior; Lower #3 (Active)   Wound Image   10/03/22 1116   Wound Etiology Venous 07/25/22 1040   Wound Cleansed Cleansed with saline 11/07/22 1201   Dressing/Treatment Collagen 10/24/22 1008   Wound Length (cm) 2 cm 11/07/22 1201   Wound Width (cm) 3 cm 11/07/22 1201   Wound Depth (cm) 0.1 cm 11/07/22 1201   Wound Surface Area (cm^2) 6 cm^2 11/07/22 1201   Change in Wound Size % (l*w) 86.36 11/07/22 1201   Wound Volume (cm^3) 0.6 cm^3 11/07/22 1201   Wound Healing % 86 11/07/22 1201   Post-Procedure Length (cm) 2.1 cm 11/07/22 1221   Post-Procedure Width (cm) 3.1 cm 11/07/22 1221   Post-Procedure Depth (cm) 0.3 cm 11/07/22 1221   Post-Procedure Surface Area (cm^2) 6.51 cm^2 11/07/22 1221   Post-Procedure Volume (cm^3) 1. 953 cm^3 11/07/22 1221   Distance Tunneling (cm) 0 cm 11/07/22 1201   Undermining Maxium Distance (cm) 0 11/07/22 1201   Wound Assessment Pink/red;Fibrin 11/07/22 1201   Drainage Amount Moderate 11/07/22 1201   Drainage Description Serosanguinous;Brown 11/07/22 1201   Odor Mild 11/07/22 1201   Kiah-wound Assessment Intact; Maceration 11/07/22 1201   Margins Defined edges 11/07/22 1201   Wound Thickness Description not for Pressure Injury Full thickness 11/07/22 1201   Number of days: 105          Percent of Wound Debrided: 100%    Total Surface Area Debrided:  27 sq cm    Diabetic/Pressure/Non Pressure Ulcers only:  Ulcer: Non-Pressure ulcer, fat layer exposed    Bleeding: Minimal    Hemostasis Achieved: by pressure    Procedural Pain: 0  / 10     Post Procedural Pain: 0 / 10     Response to treatment:  Well tolerated by patient. Plan: Will apply for TheraSkin allograft as medically necessary to assist with wound healing  Treatment Note: Please see attached Discharge Instructions. These instructions were given and signed by the patient or POA    New Prescriptions    No medications on file       Orders Placed This Encounter   Procedures    Initiate Outpatient Wound Care Protocol       Discharge Instructions          [facility] Physician Orders and Discharge Instructions  [address] [place] [address]. [place]  Telephone: [phone]  NAME:  Rylie Castillo  YOB: 1965  MEDICAL RECORD NUMBER:  [id number]  DATE: 11/7/22     Return Appointment:  Return Appointment: With Dr. Jun Jacobs  in  [facility])  [] Return Appointment for a Wound Assessment with the nurse on:     Future Appointments   Date Time Provider Eloina Zamarripa   12/5/2022 10:30 AM [address]: none  Medically necessary services: [] Skilled Nursing [] PT (Eval & Treat) [] OT (Eval & Treat) [] Social Work [] Dietician  [] Other:    Wound care instructions: If you smoke we ask that you refrain from smoking. Smoking inhibits wounds from healing.   When taking antibiotics take the entire prescription as ordered. Do not stop taking until medication is all gone unless otherwise instructed. Exercise as tolerated. Keep weight off wounds and reposition every 2 hours if applicable. Do not get wounds wet in the bath or shower unless otherwise instructed by your physician. If your wound is on your foot or leg, you may purchase a cast bag. Please ask at the pharmacy. Wash hands with soap and water prior to and after every dressing change. [x]Wash wounds with: Vashe wash - Apply enough Vashe to soak a piece of gauze and place on wound bed for 5-10 minutes. No need to rinse after soaking. [x]Kiah wound Topical Treatments: Do not apply lotions, creams, or ointments to the skin around the wound bed unless directed as followed:   Apply around the wound: Moisturizing lotion         [x]Wound Location: right lower leg   Apply Primary Dressing to wound:  polymem silver  Secondary Dressing: None   Avoid contact of tape with skin if possible. When to change Dressing:       [x] Multilayer Compression Wrap:  Type: Applied on Right lower leg(s)  4 Layer Compression Wrap    Do not get leg(s) with wrap wet. If wraps become too tight call the center or completely remove the wrap. Elevate leg(s) above the level of the heart when sitting. Avoid prolonged standing in one place. Applied in Clinic on 11/7/2022  The Goal of this therapy is to reduce edema and get into long term compression garments to control venous insufficiency, lymphedema and reduce occurrence of venous ulcers    [x] Edema Control:  Apply: Compression Stocking on the Left leg - 30-40mmHG    Elevate leg(s) above the level of the heart for 30 minutes 4-5 times a day and/or when sitting. Avoid prolonged standing in one place. [x] Lymphedema Therapy:  Wear Lymphedema pumps twice a day at settings prescribed by your physician. Avoid prolonged standing in one place. Dietary:  Important dietary reminders:  1.  Increase Protein intake (i.e. Lean meats, fish, eggs, legumes, and yogurt)  2. No added salt  3. If diabetic, follow a diabetic diet and check glucose prior to meals or as instructed by your physician. Dietary Supplements(Take twice a day unless instructed otherwise):  [] Edilma Paige  [] 30ml ProStat [] Rafy Ma [] Ensure Max/Premier [] Other:    Your nurse  is:  Saad Redman     Electronically signed by Fallon Espino RN on 11/7/2022 at 12:24 PM     [facility] Information: Should you experience any significant changes in your wound(s) or have questions about your wound care, please contact the [address] [place] [address] at [phone]. We are open from 8:00am - 4:30p Monday, Thursday and Friday; 11:00am - 5pm on Tuesday and CLOSED Wednesday. Please give us 24-48 business hours to return your call. Call your doctor now or seek immediate medical care if:    You have symptoms of infection, such as: Increased pain, swelling, warmth, or redness. Red streaks leading from the area. Pus draining from the area. A fever.          [] Patient unable to sign Discharge Instructions given to ECF/Transportation/POA         Electronically signed by Sheila Estrella MD on 11/7/2022 at 12:40 PM

## 2022-11-07 NOTE — PROGRESS NOTES
ANTICOAGULATION SERVICE    No Bernard is a 62 y.o. male with PMHx significant for chronic DVT (last in Jan, 2019) who presents to clinic 11/7/2022 for anticoagulation monitoring and adjustment. Patient has been taking warfarin for 15+ years.      Anticoagulation Indication(s):  DVT    Referring Physician:  Dr. Sonny Magdaleno  Goal INR Range:  2-3  Duration of Anticoagulation Therapy:  Indefinite  Time of day dose taken:  AM  Product patient has at home:  warfarin 5 mg (peach)    INR Summary                            Warfarin regimen (mg)  Date INR   A/P    Sun Mon Tue Wed Thu Fri Sat Mg/wk  11/7 2.3 At goal, continue  7.5 5 7.5 7.5 7.5 5 7.5 47.5  10/10 2.9 At goal, continue  7.5 5 7.5 7.5 7.5 5 7.5 47.5  7/27 3.1 Above goal, continue    7.5 5 7.5 7.5 7.5 5 7.5 47.5  7/6 3.1 Above goal, decrease  7.5 5 7.5 7.5 7.5 5 7.5 47.5  6/22 2.4 At goal, resume prv dose 7.5 7.5 7.5 7.5 7.5 5 7.5 50  6/1 2.6 At goal, continue   7.5 5 7.5 5 7.5 5 7.5 45  5/18 4.1 Above goal, dec (abx)  7.5 5 7.5 5 7.5 5 7.5 45  4/6 2.8 At goal, continue  7.5 7.5 7.5 7.5 7.5 5 7.5 50  12/1 2.9 At goal, continue  7.5 7.5 7.5 7.5 7.5 5 7.5 50  11/10 2.4 At goal, continue   7.5 7.5 7.5 7.5 7.5 5 7.5 50  10/27 4.4 Above goal, hold+dec  7.5 7.5 7.5 7.5 0/7.5 5 7.5 50  8/25 2.9 At goal, continue  7.5 7.5 7.5 7.5 7.5 7.5 7.5 52.5  7/14 2.5 At goal, continue   7.5 7.5 7.5 7.5 7.5 7.5 7.5 52.5  6/14 2.9 At goal, continue  7.5 7.5 7.5 7.5 0/7.5 7.5 7.5 52.5  5/26 3.9 Above goal (ABX), hold x 1 7.5 7.5 7.5 7.5 0/7.5 7.5 7.5 52.5  3/31 2.5 At goal, no change  7.5 7.5 7.5 7.5 7.5 7.5 7.5 52.5  2/17 3.0 At goal, no change  7.5 7.5 7.5 7.5 7.5 7.5 7.5 52.5  1/6 2.8 At goal, no change  7.5 7.5 7.5 7.5 7.5 7.5 7.5 52.5  12/2 2.9 At goal, no change  7.5 7.5 7.5 7.5 7.5 7.5 7.5 52.5  11/4 3.1 Above goal, continue  7.5 7.5 7.5 7.5 7.5 7.5 7.5 52.5  10/7 2.7 At goal, no change  7.5 7.5 7.5 7.5 7.5 7.5 7.5 52.5  9/9 2.7 At goal, no change  7.5 7.5 7.5 7.5 7.5 7.5 7.5 52.5  8/17 2.8 At goal, no change  7.5 7.5 7.5 7.5 7.5 7.5 7.5 52.5  8/3 3.4 Above goal, decrease  7.5 7.5 7.5 7.5 7.5 7.5 7.5 52.5  6/8 2.9 At goal, no change  7.5 7.5 7.5 7.5 7.5 10 7.5 55  2/28 2.7 At goal, no change  7.5 7.5 7.5 7.5 7.5 10 7.5 55  1/3 2.6 At goal, no change  7.5 7.5 7.5 7.5 7.5 10 7.5 55  11/20 2.8 At goal, no change  7.5 7.5 7.5 7.5 7.5 10 7.5 55  10/23 2.6 At goal, no change  7.5 7.5 7.5 7.5 7.5 10 7.5 55  8/23 2.2 At goal, no change  7.5 7.5 7.5 7.5 7.5 10 7.5 55  7/26 2.5 At goal, no change  7.5 7.5 7.5 7.5 7.5 10 7.5 55  6/28 2.3 At goal, no change  7.5 7.5 7.5 7.5 7.5 10 7.5 55  5/31 2.6 At goal, no change  7.5 7.5 7.5 7.5 7.5 10 7.5 55  5/1 2.5 At goal, no change  7.5 7.5 7.5 7.5 7.5 10 7.5 55  4/10 2.0 At goal, no change  7.5 7.5 7.5 7.5 7.5 10 7.5 55  3/13 2.6 At goal, no change  7.5 7.5 7.5 7.5 7.5 10 7.5 55  2/27 2.7 At goal, no change  7.5 7.5 7.5 7.5 7.5 10 7.5 55  2/20 2.9 At goal, no change  7.5 7.5 7.5 7.5 7.5 10 7.5 55  2/13 2.1 At goal, no change  7.5 7.5 7.5 7.5 7.5 10 7.5 55    Last CBC:  Lab Results   Component Value Date    RBC 4.28 05/03/2022    HGB 10.8 (L) 05/03/2022    HCT 33.3 (L) 05/03/2022    MCV 77.7 (L) 05/03/2022    MCH 25.3 (L) 05/03/2022    MPV 6.5 05/03/2022    RDW 17.2 (H) 05/03/2022     05/03/2022     Patient History:  Recent hospitalizations/HC visits 5/9/22: podiatry continue cipro+clinda x6 more weeks  7/28/21 ID: patient will potentially need IV abx.  Continue following with wound care    Recent medication changes States is starting doxycycline 100 mg BID  6/22/22: took last dose abx last night   5/2/22-current: ciprofloxacin 500 mg BID + clindamycin 300 mg TID (anticipated stop 6/20/22)    Medications taken regularly that may interact with warfarin or alter INR None reported   Warfarin dose taken as prescribed Yes - does not use pillbox (only takes warfarin)   Signs/symptoms of bleeding No h/o bleeding reported Vitamin K intake Normally avoids high vitamin K foods: ~0-1 serving per week   Recent vomiting/diarrhea/fever, changes in weight or activity level None reported   Tobacco or alcohol use Patient reports smoking 0 PPD  Patient reports having 0 drinks per day   Upcoming surgeries or procedures None reported     Assessment/Plan:  Patient's INR was therapeutic again today (2.3). Since then he was on doxycycline long term, but recently stopped for a couple of weeks and restarted. This drug can increase the INR. As pt was on it for ~4 weeks at the time of the last check and INR remained therapeutic will continue with current dose. Patient denies any missed/extra warfarin doses, diet changes, illness, bleeding, etc since last visit. Patient was instructed to continue warfarin to 7.5 mg daily except 5 mg on Monday and Friday. Repeat INR in 4 weeks. Discussed importance of needing more regular INR checks. Patient prefers to extend interval between appointments. But he is now seeing wound care every Monday and will try to coordinate appts. Patient was reminded to maintain consistent vitamin K intake and call with any bleeding, medication changes, or fever/vomiting/diarrhea. Patient understands dosing directions and information discussed. Dosing schedule and follow up appointment given to patient. Progress note routed to referring physician's office. Patient acknowledges working in consult agreement with pharmacist as referred by his/her physician. Next Clinic Appointment:  12/5    Please call [facility] at [phone] with any questions. Thanks!   Terrance Gamble, PharmD Candidate  [facility]  [place]: [address] [place] [address]: [phone]  11/7/2022 11:39 AM      For Pharmacy Admin Tracking Only    Total # of Interventions Recommended: 0  Total # of Interventions Accepted: 0  Time Spent (min): 15

## 2022-11-14 ENCOUNTER — HOSPITAL ENCOUNTER (OUTPATIENT)
Dept: WOUND CARE | Age: 57
Discharge: HOME | End: 2022-11-14
Payer: MEDICAID

## 2022-11-14 VITALS
HEART RATE: 75 BPM | RESPIRATION RATE: 16 BRPM | SYSTOLIC BLOOD PRESSURE: 131 MMHG | DIASTOLIC BLOOD PRESSURE: 94 MMHG | TEMPERATURE: 97.1 F

## 2022-11-14 DIAGNOSIS — I87.311 IDIOPATHIC CHRONIC VENOUS HYPERTENSION OF RIGHT LOWER EXTREMITY WITH ULCER (HCC): ICD-10-CM

## 2022-11-14 DIAGNOSIS — Z91.199 NONCOMPLIANCE: ICD-10-CM

## 2022-11-14 DIAGNOSIS — L97.919 IDIOPATHIC CHRONIC VENOUS HYPERTENSION OF RIGHT LOWER EXTREMITY WITH ULCER (HCC): ICD-10-CM

## 2022-11-14 DIAGNOSIS — I89.0 CHRONIC ACQUIRED LYMPHEDEMA: Primary | ICD-10-CM

## 2022-11-14 PROCEDURE — 15271 SKIN SUB GRAFT TRNK/ARM/LEG: CPT

## 2022-11-14 PROCEDURE — 11045 DBRDMT SUBQ TISS EACH ADDL: CPT

## 2022-11-14 PROCEDURE — 11042 DBRDMT SUBQ TIS 1ST 20SQCM/<: CPT

## 2022-11-14 PROCEDURE — 6370000000 HC RX 637 (ALT 250 FOR IP): Performed by: SPECIALIST

## 2022-11-14 PROCEDURE — 15271 SKIN SUB GRAFT TRNK/ARM/LEG: CPT | Performed by: SPECIALIST

## 2022-11-14 PROCEDURE — 29581 APPL MULTLAYER CMPRN SYS LEG: CPT

## 2022-11-14 RX ORDER — CLOBETASOL PROPIONATE 0.5 MG/G
OINTMENT TOPICAL ONCE
OUTPATIENT
Start: 2022-11-14 | End: 2022-11-14

## 2022-11-14 RX ORDER — LIDOCAINE 50 MG/G
OINTMENT TOPICAL ONCE
OUTPATIENT
Start: 2022-11-14 | End: 2022-11-14

## 2022-11-14 RX ORDER — GINSENG 100 MG
CAPSULE ORAL ONCE
OUTPATIENT
Start: 2022-11-14 | End: 2022-11-14

## 2022-11-14 RX ORDER — BETAMETHASONE DIPROPIONATE 0.05 %
OINTMENT (GRAM) TOPICAL ONCE
OUTPATIENT
Start: 2022-11-14 | End: 2022-11-14

## 2022-11-14 RX ORDER — LIDOCAINE HYDROCHLORIDE 40 MG/ML
SOLUTION TOPICAL ONCE
Status: CANCELLED | OUTPATIENT
Start: 2022-11-14 | End: 2022-11-14

## 2022-11-14 RX ORDER — BACITRACIN, NEOMYCIN, POLYMYXIN B 400; 3.5; 5 [USP'U]/G; MG/G; [USP'U]/G
OINTMENT TOPICAL ONCE
OUTPATIENT
Start: 2022-11-14 | End: 2022-11-14

## 2022-11-14 RX ORDER — LIDOCAINE HYDROCHLORIDE 20 MG/ML
JELLY TOPICAL ONCE
OUTPATIENT
Start: 2022-11-14 | End: 2022-11-14

## 2022-11-14 RX ORDER — GENTAMICIN SULFATE 1 MG/G
OINTMENT TOPICAL ONCE
OUTPATIENT
Start: 2022-11-14 | End: 2022-11-14

## 2022-11-14 RX ORDER — BACITRACIN ZINC AND POLYMYXIN B SULFATE 500; 1000 [USP'U]/G; [USP'U]/G
OINTMENT TOPICAL ONCE
OUTPATIENT
Start: 2022-11-14 | End: 2022-11-14

## 2022-11-14 RX ORDER — LIDOCAINE HYDROCHLORIDE 40 MG/ML
SOLUTION TOPICAL ONCE
Status: COMPLETED | OUTPATIENT
Start: 2022-11-14 | End: 2022-11-14

## 2022-11-14 RX ORDER — LIDOCAINE 40 MG/G
CREAM TOPICAL ONCE
OUTPATIENT
Start: 2022-11-14 | End: 2022-11-14

## 2022-11-14 RX ADMIN — LIDOCAINE HYDROCHLORIDE: 40 SOLUTION TOPICAL at 10:30

## 2022-11-14 ASSESSMENT — PAIN DESCRIPTION - FREQUENCY: FREQUENCY: CONTINUOUS

## 2022-11-14 ASSESSMENT — PAIN DESCRIPTION - PAIN TYPE: TYPE: CHRONIC PAIN

## 2022-11-14 ASSESSMENT — PAIN DESCRIPTION - ORIENTATION: ORIENTATION: RIGHT

## 2022-11-14 ASSESSMENT — PAIN DESCRIPTION - LOCATION: LOCATION: LEG

## 2022-11-14 ASSESSMENT — PAIN SCALES - GENERAL: PAINLEVEL_OUTOF10: 2

## 2022-11-14 ASSESSMENT — PAIN DESCRIPTION - DESCRIPTORS: DESCRIPTORS: BURNING

## 2022-11-14 NOTE — PROGRESS NOTES
TheraSkin Treatment Note      Goal:  Patient will receive safe and proper application of skin substitute. Patient will comply with caring for dressing, offloading and reporting complications. [x] Expiration date of TheraSkin checked immediately prior to use. [x] Package intact prior to use and no damage noted. [x] Transport temperature controlled and acceptable. TheraSkin was prepared for application by removing from package. TheraSkin was rinsed 2 times in room temperature normal saline for 5 seconds each time. A 2nd saline rinse was left on the TheraSkin until the provider was ready to apply it within 120 minutes of thawing. White side placed against ulcer bed. [x] Theraskin was applied to wound # 1 and 2, and 3 and affixed with steri-strips by the provider. [x] Secondary dressing applied as ordered. [x] Patient/caregiver was instructed not to remove dressing and to keep it clean and dry. See AVS for further instructions given to patient/caregiver. Theraskin may be applied a total of 10 times per wound over a 12 week period. Additionally Theraskin may only be used every 12 months per wound. Date of first application of Theraskin for this current wound is November 14, 2022.      Application # 1           Guidelines followed      Electronically signed by Juan King RN on 11/14/2022 at 11:16 AM

## 2022-11-14 NOTE — PROGRESS NOTES
Multilayer Compression Wrap   (Not Unna) Below the Knee    NAME:  No Bernard  YOB: 1965  MEDICAL RECORD NUMBER:  [id number]  DATE:  11/14/2022       Removed old Multilayer wrap if present and washed leg with mild soap/water. Applied moisturizing agent to dry skin as needed. Applied primary and secondary dressing as ordered    Applied multilayered dressing below the knee to Right lower leg(s)  (  4 Layer Compression Wrap ) . Instructed patient/caregiver not to remove dressing and to keep it clean and dry. Instructed patient/caregiver on complications to report to provider, such as pain, numbness in toes, heavy drainage, and slippage of dressing. Instructed patient on purpose of compression dressing and on activity and exercise recommendations.    Applied per   Guidelines    Electronically signed by Fani Amaro RN on 11/14/2022 at 11:34 AM

## 2022-11-14 NOTE — DISCHARGE INSTRUCTIONS
215 [facility] Physician Orders and Discharge Instructions  [address] [address] [address] [address] [facility]. [place]. [place]  Telephone: [phone]  NAME:  Serafin Espinal  YOB: 1965  MEDICAL RECORD NUMBER:  [id number]  DATE: 11/14/22     Return Appointment:  Return Appointment: With Dr. Los Bell  in  1 [facility])  [] Return Appointment for a Wound Assessment with the nurse on:     Future Appointments   Date Time Provider Eloina Zamarripa   12/5/2022 10:30 AM 6 Christine Guido Eloued: none  Medically necessary services: [] Skilled Nursing [] PT (Eval & Treat) [] OT (Eval & Treat) [] Social Work [] Dietician  [] Other:    Wound care instructions: If you smoke we ask that you refrain from smoking. Smoking inhibits wounds from healing. When taking antibiotics take the entire prescription as ordered. Do not stop taking until medication is all gone unless otherwise instructed. Exercise as tolerated. Keep weight off wounds and reposition every 2 hours if applicable. Do not get wounds wet in the bath or shower unless otherwise instructed by your physician. If your wound is on your foot or leg, you may purchase a cast bag. Please ask at the pharmacy. Wash hands with soap and water prior to and after every dressing change. [x]Wash wounds with: Vashe wash - Apply enough Vashe to soak a piece of gauze and place on wound bed for 5-10 minutes. No need to rinse after soaking.   [x]Kiah wound Topical Treatments: Do not apply lotions, creams, or ointments to the skin around the wound bed unless directed as followed:   Apply around the wound: No-Sting barrier film       [x]Application of a biologic skin substitute:yes, theaskin on wounds #1, 2, and 3- first application, covered with mepitel, steri strips, hydrogel, gauze  This is a highly specialized wound care dressing that is intended to enhance your own bodies ability to increase growth factors and other healing abilities. Please leave the dressing clean, dry and intact unless otherwise instructed by your physician. Leave steri-strips and non adherent in place if changing the dressing as instructed. []Instructions for 2003 [facility] if applicable:       [x] Multilayer Compression Wrap:  Type: Applied on Right lower leg(s)  4 Layer Compression Wrap    Do not get leg(s) with wrap wet. If wraps become too tight call the center or completely remove the wrap. Elevate leg(s) above the level of the heart when sitting. Avoid prolonged standing in one place. Applied in Clinic on 11/14/2022  The Goal of this therapy is to reduce edema and get into long term compression garments to control venous insufficiency, lymphedema and reduce occurrence of venous ulcers    [x] Edema Control:  Apply: Compression Stocking on the Left leg 30-40mmHG    Elevate leg(s) above the level of the heart for 30 minutes 4-5 times a day and/or when sitting. Avoid prolonged standing in one place. Dietary:  Important dietary reminders:  1. Increase Protein intake (i.e. Lean meats, fish, eggs, legumes, and yogurt)  2. No added salt  3. If diabetic, follow a diabetic diet and check glucose prior to meals or as instructed by your physician. Dietary Supplements(Take twice a day unless instructed otherwise):  [] Ramandeep Mathew  [] 30ml ProStat [] Dulce Malik [] Ensure Max/Premier [] Other:    Your nurse  is:  Aretta Shone     Electronically signed by Lior Gardner RN on 11/14/2022 at [address] Information: Should you experience any significant changes in your wound(s) or have questions about your wound care, please contact the [address] [place] [address] at [phone]. We are open from 8:00am - 4:30p Monday, Thursday and Friday; 11:00am - 5pm on Tuesday and CLOSED Wednesday. Please give us 24-48 business hours to return your call.       Call your doctor now or seek immediate medical care if:    You have symptoms of infection, such as: Increased pain, swelling, warmth, or redness. Red streaks leading from the area. Pus draining from the area. A fever.          [] Patient unable to sign Discharge Instructions given to ECF/Transportation/POA

## 2022-11-14 NOTE — PROGRESS NOTES
[facility]  [place] Note      Darren Simon  MEDICAL RECORD NUMBER:  [id number]  AGE: 62 y.o. GENDER: male  : 1965  EPISODE DATE:  2022    Subjective:     Chief Complaint   Patient presents with    Wound Check     Right lower leg         HISTORY of PRESENT ILLNESS HPI     Darren Simon is a 62 y.o. male who presents today for wound/ulcer evaluation. History of Wound Context: Well-known to the wound care center having been treated for years for chronic venous insufficiency with open wounds and significant lymphedema. He has been lost to follow-up for almost 1 year. He returns today with multiple open wounds of the right lower extremity with massive edema. He states he has been utilizing his lymphedema pumps daily. There has been no breakthrough drainage from the compression wraps. He has resumed his doxycycline therapy    Wound has failed to heal greater than 30%-50% at initial assessment.   Patient has received greater than 30 days of conservative treatment including debridements at each wound care visit and the following: Compression, antibiotics    Pain Assessment:  Wound/Ulcer Pain Timing/Severity: none  Quality of pain: N/A  Severity:  0 / 10   Modifying Factors: None  Associated Signs/Symptoms: edema and drainage    Ulcer Identification:  Ulcer Type: venous  Contributing Factors: edema, venous stasis, lymphedema, obesity, and anticoagulation therapy    Last D3Z if applicable: No results found for: LABA1C    Objective:      BP (!) 131/94   Pulse 75   Temp 97.1 °F (36.2 °C) (Temporal)   Resp 16     Wt Readings from Last 3 Encounters:   22 251 lb 6.4 oz (114 kg)   22 251 lb 6.4 oz (114 kg)   10/27/21 244 lb (110.7 kg)       PHYSICAL EXAM    Extremities: no cyanosis, no clubbing, 3 + edema-  right lower extremity, and 3 full-thickness wounds containing granulation tissue, minimal fibrin and epithelialization along the margins of the wound being medial, anterior and posterior knee. Periwound with dry flaky skin    Assessment:      1. Chronic acquired lymphedema    2. Idiopathic chronic venous hypertension of right lower extremity with ulcer ([address]    3. Noncompliance         Procedure Note  Indications:  Based on my examination of this patient's wound(s) today, sharp excision is required to promote healing and evaluate the extent healing. Performed by: Yuri Minor MD    Consent obtained? Yes    Time out taken: Yes    Pain Control: Anesthetic: 4% Lidocaine Liquid Topical     Debridement:Excisional Debridement    Using curette the wound was sharply debrided    down through and including the removal of  epidermis, dermis, and subcutaneous tissue. Devitalized Tissue Debrided:  fibrin and biofilm      Wound #: 1, 2, and 3     Pre & Post  Debridement Measurements:  Wound 07/25/22 Leg Right; Lower;Medial #1 (Active)   Wound Image   10/03/22 1116   Wound Etiology Venous 07/25/22 1040   Wound Cleansed Cleansed with saline 11/14/22 1030   Dressing/Treatment Hydrating gel 11/14/22 1129   Wound Length (cm) 2.9 cm 11/14/22 1030   Wound Width (cm) 2.8 cm 11/14/22 1030   Wound Depth (cm) 0.1 cm 11/14/22 1030   Wound Surface Area (cm^2) 8.12 cm^2 11/14/22 1030   Change in Wound Size % (l*w) 65.67 11/14/22 1030   Wound Volume (cm^3) 0.812 cm^3 11/14/22 1030   Wound Healing % 66 11/14/22 1030   Post-Procedure Length (cm) 3 cm 11/14/22 1115   Post-Procedure Width (cm) 2.9 cm 11/14/22 1115   Post-Procedure Depth (cm) 0.3 cm 11/14/22 1115   Post-Procedure Surface Area (cm^2) 8.7 cm^2 11/14/22 1115   Post-Procedure Volume (cm^3) 2.61 cm^3 11/14/22 1115   Distance Tunneling (cm) 0 cm 11/07/22 1201   Undermining Maxium Distance (cm) 0 11/07/22 1201   Wound Assessment Granulation tissue;Pink/red;Fibrin 11/14/22 1030   Drainage Amount Moderate 11/14/22 1030   Drainage Description Brown 11/14/22 1030   Odor Mild 11/14/22 1030   Kiah-wound Assessment Fragile; Maceration 11/14/22 1030   Margins Defined edges 11/14/22 1030   Wound Thickness Description not for Pressure Injury Full thickness 11/14/22 1030   Number of days: 112       Wound 07/25/22 Leg Right; Anterior; Lower #2 (Active)   Wound Image   10/03/22 1116   Wound Etiology Venous 07/25/22 1040   Wound Cleansed Cleansed with saline 11/14/22 1030   Dressing/Treatment Hydrating gel 11/14/22 1129   Wound Length (cm) 1.9 cm 11/14/22 1030   Wound Width (cm) 5.5 cm 11/14/22 1030   Wound Depth (cm) 0.1 cm 11/14/22 1030   Wound Surface Area (cm^2) 10.45 cm^2 11/14/22 1030   Change in Wound Size % (l*w) 77.08 11/14/22 1030   Wound Volume (cm^3) 1.045 cm^3 11/14/22 1030   Wound Healing % 77 11/14/22 1030   Post-Procedure Length (cm) 2 cm 11/14/22 1115   Post-Procedure Width (cm) 5.6 cm 11/14/22 1115   Post-Procedure Depth (cm) 0.3 cm 11/14/22 1115   Post-Procedure Surface Area (cm^2) 11.2 cm^2 11/14/22 1115   Post-Procedure Volume (cm^3) 3.36 cm^3 11/14/22 1115   Distance Tunneling (cm) 0 cm 11/07/22 1201   Undermining Maxium Distance (cm) 0 11/07/22 1201   Wound Assessment Fibrin;Pink/red 11/14/22 1030   Drainage Amount Moderate 11/14/22 1030   Drainage Description Serosanguinous;Brown 11/14/22 1030   Odor Mild 11/14/22 1030   Kiah-wound Assessment Maceration 11/14/22 1030   Margins Defined edges 11/14/22 1030   Wound Thickness Description not for Pressure Injury Full thickness 11/14/22 1030   Number of days: 112       Wound 07/25/22 Leg Right;Posterior; Lower #3 (Active)   Wound Image   10/03/22 1116   Wound Etiology Venous 07/25/22 1040   Wound Cleansed Cleansed with saline 11/14/22 1030   Dressing/Treatment Collagen 10/24/22 1008   Wound Length (cm) 2.5 cm 11/14/22 1030   Wound Width (cm) 2.5 cm 11/14/22 1030   Wound Depth (cm) 0.1 cm 11/14/22 1030   Wound Surface Area (cm^2) 6.25 cm^2 11/14/22 1030   Change in Wound Size % (l*w) 85.8 11/14/22 1030   Wound Volume (cm^3) 0.625 cm^3 11/14/22 1030   Wound Healing % 86 11/14/22 1030   Post-Procedure Length (cm) 2.6 cm 11/14/22 1115   Post-Procedure Width (cm) 2.6 cm 11/14/22 1115   Post-Procedure Depth (cm) 0.3 cm 11/14/22 1115   Post-Procedure Surface Area (cm^2) 6.76 cm^2 11/14/22 1115   Post-Procedure Volume (cm^3) 2. 028 cm^3 11/14/22 1115   Distance Tunneling (cm) 0 cm 11/07/22 1201   Undermining Maxium Distance (cm) 0 11/07/22 1201   Wound Assessment Pink/red;Fibrin 11/14/22 1030   Drainage Amount Moderate 11/14/22 1030   Drainage Description Serosanguinous;Brown 11/14/22 1030   Odor Mild 11/14/22 1030   Kiah-wound Assessment Intact; Maceration 11/14/22 1030   Margins Defined edges 11/14/22 1030   Wound Thickness Description not for Pressure Injury Full thickness 11/14/22 1030   Number of days: 112          Percent of Wound Debrided: 100%    Total Surface Area Debrided:  26 sq cm    Diabetic/Pressure/Non Pressure Ulcers only:  Ulcer: Non-Pressure ulcer, fat layer exposed    Bleeding: Minimal    Hemostasis Achieved: by pressure    Procedural Pain: 0  / 10     Post Procedural Pain: 0 / 10     Response to treatment:  Well tolerated by patient. rocedure:  Skin Substitute Application    Performed by: Silva Charlton MD    Ulcer Type: venous    Consent obtained: Yes    Time out taken: Yes    Product Utilized: Other TheraSkin 26 cm     Fenestrated: No    Mesher Utilized: No    Instrument(s) curette    Skin Substitute was Applied to Ulcer Number(s):    Ulcer #: 1 and 2,3    Wound 07/25/22 Leg Right; Lower;Medial #1 (Active)   Wound Image   10/03/22 1116   Wound Etiology Venous 07/25/22 1040   Wound Cleansed Cleansed with saline 11/14/22 1030   Dressing/Treatment Hydrating gel 11/14/22 1129   Wound Length (cm) 2.9 cm 11/14/22 1030   Wound Width (cm) 2.8 cm 11/14/22 1030   Wound Depth (cm) 0.1 cm 11/14/22 1030   Wound Surface Area (cm^2) 8.12 cm^2 11/14/22 1030   Change in Wound Size % (l*w) 65.67 11/14/22 1030   Wound Volume (cm^3) 0.812 cm^3 11/14/22 1030   Wound Healing % 66 11/14/22 1030   Post-Procedure Length (cm) 3 cm 11/14/22 1115   Post-Procedure Width (cm) 2.9 cm 11/14/22 1115   Post-Procedure Depth (cm) 0.3 cm 11/14/22 1115   Post-Procedure Surface Area (cm^2) 8.7 cm^2 11/14/22 1115   Post-Procedure Volume (cm^3) 2.61 cm^3 11/14/22 1115   Distance Tunneling (cm) 0 cm 11/07/22 1201   Undermining Maxium Distance (cm) 0 11/07/22 1201   Wound Assessment Granulation tissue;Pink/red;Fibrin 11/14/22 1030   Drainage Amount Moderate 11/14/22 1030   Drainage Description Brown 11/14/22 1030   Odor Mild 11/14/22 1030   Kiah-wound Assessment Fragile; Maceration 11/14/22 1030   Margins Defined edges 11/14/22 1030   Wound Thickness Description not for Pressure Injury Full thickness 11/14/22 1030   Number of days: 112       Wound 07/25/22 Leg Right; Anterior; Lower #2 (Active)   Wound Image   10/03/22 1116   Wound Etiology Venous 07/25/22 1040   Wound Cleansed Cleansed with saline 11/14/22 1030   Dressing/Treatment Hydrating gel 11/14/22 1129   Wound Length (cm) 1.9 cm 11/14/22 1030   Wound Width (cm) 5.5 cm 11/14/22 1030   Wound Depth (cm) 0.1 cm 11/14/22 1030   Wound Surface Area (cm^2) 10.45 cm^2 11/14/22 1030   Change in Wound Size % (l*w) 77.08 11/14/22 1030   Wound Volume (cm^3) 1.045 cm^3 11/14/22 1030   Wound Healing % 77 11/14/22 1030   Post-Procedure Length (cm) 2 cm 11/14/22 1115   Post-Procedure Width (cm) 5.6 cm 11/14/22 1115   Post-Procedure Depth (cm) 0.3 cm 11/14/22 1115   Post-Procedure Surface Area (cm^2) 11.2 cm^2 11/14/22 1115   Post-Procedure Volume (cm^3) 3.36 cm^3 11/14/22 1115   Distance Tunneling (cm) 0 cm 11/07/22 1201   Undermining Maxium Distance (cm) 0 11/07/22 1201   Wound Assessment Fibrin;Pink/red 11/14/22 1030   Drainage Amount Moderate 11/14/22 1030   Drainage Description Serosanguinous;Brown 11/14/22 1030   Odor Mild 11/14/22 1030   Kiah-wound Assessment Maceration 11/14/22 1030   Margins Defined edges 11/14/22 1030   Wound Thickness Description not for Pressure Injury Full thickness 11/14/22 1030   Number of days: 112       Wound 07/25/22 Leg Right;Posterior; Lower #3 (Active)   Wound Image   10/03/22 1116   Wound Etiology Venous 07/25/22 1040   Wound Cleansed Cleansed with saline 11/14/22 1030   Dressing/Treatment Collagen 10/24/22 1008   Wound Length (cm) 2.5 cm 11/14/22 1030   Wound Width (cm) 2.5 cm 11/14/22 1030   Wound Depth (cm) 0.1 cm 11/14/22 1030   Wound Surface Area (cm^2) 6.25 cm^2 11/14/22 1030   Change in Wound Size % (l*w) 85.8 11/14/22 1030   Wound Volume (cm^3) 0.625 cm^3 11/14/22 1030   Wound Healing % 86 11/14/22 1030   Post-Procedure Length (cm) 2.6 cm 11/14/22 1115   Post-Procedure Width (cm) 2.6 cm 11/14/22 1115   Post-Procedure Depth (cm) 0.3 cm 11/14/22 1115   Post-Procedure Surface Area (cm^2) 6.76 cm^2 11/14/22 1115   Post-Procedure Volume (cm^3) 2. 028 cm^3 11/14/22 1115   Distance Tunneling (cm) 0 cm 11/07/22 1201   Undermining Maxium Distance (cm) 0 11/07/22 1201   Wound Assessment Pink/red;Fibrin 11/14/22 1030   Drainage Amount Moderate 11/14/22 1030   Drainage Description Serosanguinous;Brown 11/14/22 1030   Odor Mild 11/14/22 1030   Kiah-wound Assessment Intact; Maceration 11/14/22 1030   Margins Defined edges 11/14/22 1030   Wound Thickness Description not for Pressure Injury Full thickness 11/14/22 1030   Number of days: 112          Diabetic/Pressure/Non Pressure Ulcers:  Ulcer:   Non-Pressure ulcer, fat layer exposed      Total Surface Area of Ulcer(s) Covered 26 sq/cm    Was the Product Layered  No     Amount of Product Applied 26 sq/cm     Amount of Product Wasted 0 sq/cm     Reason for Waste:N/A     Surgically Fixated: No:     Secured With: Steri Strips and Silicone Dressing     Procedural Pain: 0/10     Post Procedural Pain: 0 / 10    Response to Treatment: Well tolerated by patient.   A TheraSkin allograft was applied to wounds designated as #1 and #2 and#3 as medically necessary without difficulty      Plan:     Treatment Note: Please posterior leg wound   Apply Primary Dressing to wound:  polymem silver  Secondary Dressing: 4X4 gauze pad   Avoid contact of tape with skin if possible. When to change Dressing: DO NOT CHANGE        [x]Application of a biologic skin substitute: Yes: Rochelle : Wound Location: 1 and 2, first application, covered with mepitel, steri strips, hydrogel, gauze  This is a highly specialized wound care dressing that is intended to enhance your own bodies ability to increase growth factors and other healing abilities. Please leave the dressing clean, dry and intact unless otherwise instructed by your physician. Leave steri-strips and non adherent in place if changing the dressing as instructed. []Instructions for 2003 [facility] if applicable:           [x] Multilayer Compression Wrap:  Type: Applied on Right lower leg(s)  4 Layer Compression Wrap    Do not get leg(s) with wrap wet. If wraps become too tight call the center or completely remove the wrap. Elevate leg(s) above the level of the heart when sitting. Avoid prolonged standing in one place. Applied in Clinic on 11/14/2022  The Goal of this therapy is to reduce edema and get into long term compression garments to control venous insufficiency, lymphedema and reduce occurrence of venous ulcers    [x] Edema Control:  Apply: Compression Stocking on the Left leg 30-40mmHG    Elevate leg(s) above the level of the heart for 30 minutes 4-5 times a day and/or when sitting. Avoid prolonged standing in one place. Dietary:  Important dietary reminders:  1. Increase Protein intake (i.e. Lean meats, fish, eggs, legumes, and yogurt)  2. No added salt  3. If diabetic, follow a diabetic diet and check glucose prior to meals or as instructed by your physician.     Dietary Supplements(Take twice a day unless instructed otherwise):  [] Catherine Dapper  [] 30ml ProStat [] Dina  [] Ensure Max/Premier [] Other:    Your nurse  is:  Shanice Ferrer     Electronically signed by Lj Archibald RN on 11/14/2022 at [address] Information: Should you experience any significant changes in your wound(s) or have questions about your wound care, please contact the [address] [place] [address] at [phone]. We are open from 8:00am - 4:30p Monday, Thursday and Friday; 11:00am - 5pm on Tuesday and CLOSED Wednesday. Please give us 24-48 business hours to return your call. Call your doctor now or seek immediate medical care if:    You have symptoms of infection, such as: Increased pain, swelling, warmth, or redness. Red streaks leading from the area. Pus draining from the area. A fever.          [] Patient unable to sign Discharge Instructions given to ECF/Transportation/POA         Electronically signed by Sarthak Delcid MD on 11/14/2022 at 1:21 PM

## 2022-11-21 ENCOUNTER — HOSPITAL ENCOUNTER (OUTPATIENT)
Dept: WOUND CARE | Age: 57
Discharge: HOME | End: 2022-11-21
Payer: MEDICAID

## 2022-11-21 VITALS
HEART RATE: 76 BPM | TEMPERATURE: 97.1 F | DIASTOLIC BLOOD PRESSURE: 83 MMHG | SYSTOLIC BLOOD PRESSURE: 146 MMHG | RESPIRATION RATE: 16 BRPM

## 2022-11-21 DIAGNOSIS — I87.311 IDIOPATHIC CHRONIC VENOUS HYPERTENSION OF RIGHT LOWER EXTREMITY WITH ULCER (HCC): ICD-10-CM

## 2022-11-21 DIAGNOSIS — I89.0 CHRONIC ACQUIRED LYMPHEDEMA: Primary | ICD-10-CM

## 2022-11-21 DIAGNOSIS — L97.919 IDIOPATHIC CHRONIC VENOUS HYPERTENSION OF RIGHT LOWER EXTREMITY WITH ULCER (HCC): ICD-10-CM

## 2022-11-21 DIAGNOSIS — Z91.199 NONCOMPLIANCE: ICD-10-CM

## 2022-11-21 PROCEDURE — 11042 DBRDMT SUBQ TIS 1ST 20SQCM/<: CPT | Performed by: SPECIALIST

## 2022-11-21 PROCEDURE — 6370000000 HC RX 637 (ALT 250 FOR IP): Performed by: SPECIALIST

## 2022-11-21 PROCEDURE — 29581 APPL MULTLAYER CMPRN SYS LEG: CPT

## 2022-11-21 PROCEDURE — 11042 DBRDMT SUBQ TIS 1ST 20SQCM/<: CPT

## 2022-11-21 RX ORDER — GINSENG 100 MG
CAPSULE ORAL ONCE
OUTPATIENT
Start: 2022-11-21 | End: 2022-11-21

## 2022-11-21 RX ORDER — LIDOCAINE 50 MG/G
OINTMENT TOPICAL ONCE
OUTPATIENT
Start: 2022-11-21 | End: 2022-11-21

## 2022-11-21 RX ORDER — CLOBETASOL PROPIONATE 0.5 MG/G
OINTMENT TOPICAL ONCE
OUTPATIENT
Start: 2022-11-21 | End: 2022-11-21

## 2022-11-21 RX ORDER — GENTAMICIN SULFATE 1 MG/G
OINTMENT TOPICAL ONCE
OUTPATIENT
Start: 2022-11-21 | End: 2022-11-21

## 2022-11-21 RX ORDER — LIDOCAINE 40 MG/G
CREAM TOPICAL ONCE
OUTPATIENT
Start: 2022-11-21 | End: 2022-11-21

## 2022-11-21 RX ORDER — BACITRACIN ZINC AND POLYMYXIN B SULFATE 500; 1000 [USP'U]/G; [USP'U]/G
OINTMENT TOPICAL ONCE
OUTPATIENT
Start: 2022-11-21 | End: 2022-11-21

## 2022-11-21 RX ORDER — LIDOCAINE HYDROCHLORIDE 40 MG/ML
SOLUTION TOPICAL ONCE
Status: COMPLETED | OUTPATIENT
Start: 2022-11-21 | End: 2022-11-21

## 2022-11-21 RX ORDER — LIDOCAINE HYDROCHLORIDE 20 MG/ML
JELLY TOPICAL ONCE
OUTPATIENT
Start: 2022-11-21 | End: 2022-11-21

## 2022-11-21 RX ORDER — BACITRACIN, NEOMYCIN, POLYMYXIN B 400; 3.5; 5 [USP'U]/G; MG/G; [USP'U]/G
OINTMENT TOPICAL ONCE
OUTPATIENT
Start: 2022-11-21 | End: 2022-11-21

## 2022-11-21 RX ORDER — BETAMETHASONE DIPROPIONATE 0.05 %
OINTMENT (GRAM) TOPICAL ONCE
OUTPATIENT
Start: 2022-11-21 | End: 2022-11-21

## 2022-11-21 RX ORDER — LIDOCAINE HYDROCHLORIDE 40 MG/ML
SOLUTION TOPICAL ONCE
OUTPATIENT
Start: 2022-11-21 | End: 2022-11-21

## 2022-11-21 RX ADMIN — LIDOCAINE HYDROCHLORIDE: 40 SOLUTION TOPICAL at 11:09

## 2022-11-21 ASSESSMENT — PAIN DESCRIPTION - DESCRIPTORS: DESCRIPTORS: BURNING

## 2022-11-21 ASSESSMENT — PAIN DESCRIPTION - PAIN TYPE: TYPE: CHRONIC PAIN

## 2022-11-21 ASSESSMENT — PAIN DESCRIPTION - ORIENTATION: ORIENTATION: RIGHT

## 2022-11-21 ASSESSMENT — PAIN DESCRIPTION - LOCATION: LOCATION: LEG

## 2022-11-21 ASSESSMENT — PAIN DESCRIPTION - FREQUENCY: FREQUENCY: CONTINUOUS

## 2022-11-21 ASSESSMENT — PAIN SCALES - GENERAL: PAINLEVEL_OUTOF10: 2

## 2022-11-21 NOTE — DISCHARGE INSTRUCTIONS
[address] [facility] Physician Orders and Discharge Instructions  [address] [address] [address] [address] [facility]. [place]. [place]  Telephone: [phone]  NAME:  Mackenzie Wooten  YOB: 1965  MEDICAL RECORD NUMBER:  [id number]  DATE: 11/21/22     Return Appointment:  Return Appointment: With Dr. Bernadette Lechuga  in  1 [facility])  [] Return Appointment for a Wound Assessment with the nurse on:     Future Appointments   Date Time Provider Eloina Zamarripa   12/5/2022 10:30 AM 6 Christine Guido Eloued: none  Medically necessary services: [] Skilled Nursing [] PT (Eval & Treat) [] OT (Eval & Treat) [] Social Work [] Dietician  [] Other:    Wound care instructions: If you smoke we ask that you refrain from smoking. Smoking inhibits wounds from healing. When taking antibiotics take the entire prescription as ordered. Do not stop taking until medication is all gone unless otherwise instructed. Exercise as tolerated. Keep weight off wounds and reposition every 2 hours if applicable. Do not get wounds wet in the bath or shower unless otherwise instructed by your physician. If your wound is on your foot or leg, you may purchase a cast bag. Please ask at the pharmacy. Wash hands with soap and water prior to and after every dressing change. [x]Wash wounds with: Vashe wash - Apply enough Vashe to soak a piece of gauze and place on wound bed for 5-10 minutes. No need to rinse after soaking. [x]Kiah wound Topical Treatments: Do not apply lotions, creams, or ointments to the skin around the wound bed unless directed as followed:   Apply around the wound: Moisturizing lotion, zinc paste         [x]Wound Location: right lower leg   Apply Primary Dressing to wound:  poly mem silver  Secondary Dressing: 4X4 gauze pad   Avoid contact of tape with skin if possible.   When to change Dressing: DO NOT CHANGE        [x] Multilayer Compression Wrap:  Type: Applied on Right lower leg(s)  4 Layer Compression Wrap    Do not get leg(s) with wrap wet. If wraps become too tight call the center or completely remove the wrap. Elevate leg(s) above the level of the heart when sitting. Avoid prolonged standing in one place. Applied in Clinic on 11/21/2022  The Goal of this therapy is to reduce edema and get into long term compression garments to control venous insufficiency, lymphedema and reduce occurrence of venous ulcers    [x] Edema Control:  Apply: Compression Stocking on the Left leg     Elevate leg(s) above the level of the heart for 30 minutes 4-5 times a day and/or when sitting. Avoid prolonged standing in one place. Dietary:  Important dietary reminders:  1. Increase Protein intake (i.e. Lean meats, fish, eggs, legumes, and yogurt)  2. No added salt  3. If diabetic, follow a diabetic diet and check glucose prior to meals or as instructed by your physician. Dietary Supplements(Take twice a day unless instructed otherwise):  [] Wayne Boers  [] 30ml ProStat [] Marilia Sensing [] Ensure Max/Premier [] Other:    Your nurse  is:  Emile Denise     Electronically signed by Robyn Mendenhall RN on 11/21/2022 at 11:34 AM     [address] Information: Should you experience any significant changes in your wound(s) or have questions about your wound care, please contact the [address] [place] [address] at [phone]. We are open from 8:00am - 4:30p Monday, Thursday and Friday; 11:00am - 5pm on Tuesday and CLOSED Wednesday. Please give us 24-48 business hours to return your call. Call your doctor now or seek immediate medical care if:    You have symptoms of infection, such as: Increased pain, swelling, warmth, or redness. Red streaks leading from the area. Pus draining from the area. A fever.          [] Patient unable to sign Discharge Instructions given to ECF/Transportation/POA

## 2022-11-21 NOTE — PROGRESS NOTES
[facility]  Progress Note and Procedure Note      Chung Jacobson  MEDICAL RECORD NUMBER:  [id number]  AGE: 62 y.o. GENDER: male  : 1965  EPISODE DATE:  2022    Subjective:     Chief Complaint   Patient presents with    Wound Check     Right lower leg         HISTORY of PRESENT ILLNESS HPI     Chung Jacobson is a 62 y.o. male who presents today for wound/ulcer evaluation. History of Wound Context: Well-known to the wound care center having been treated for years for chronic venous insufficiency with open wounds and significant lymphedema. He has been lost to follow-up for almost 1 year. He returns today with multiple open wounds of the right lower extremity with massive edema. He states he has been utilizing his lymphedema pumps daily. There has been no breakthrough drainage from the compression wraps. He has resumed his doxycycline therapy. He is now 1 week post application of TheraSkin allograft  Wound/Ulcer Pain Timing/Severity: none  Quality of pain: N/A  Severity:  0 / 10   Modifying Factors: None  Associated Signs/Symptoms: edema and drainage    Ulcer Identification:  Ulcer Type: venous    Contributing Factors: edema, venous stasis, lymphedema, obesity, and anticoagulation therapy    Acute Wound: N/A not an acute wound    PAST MEDICAL HISTORY        Diagnosis Date    DVT (deep venous thrombosis) (HCC)     Hx of blood clots     Pain and swelling of right lower leg        PAST SURGICAL HISTORY    Past Surgical History:   Procedure Laterality Date    BALLOON ANGIOPLASTY, ARTERY Right 2017    at [facility], ESOPHAGUS      SKIN SPLIT GRAFT Right        FAMILY HISTORY    Family History   Problem Relation Age of Onset    Diabetes Mother     Heart Attack Father        SOCIAL HISTORY    Social History     Tobacco Use    Smoking status: Never    Smokeless tobacco: Never   Vaping Use    Vaping Use: Never used   Substance Use Topics    Alcohol use:  No Drug use: No       ALLERGIES    No Known Allergies    MEDICATIONS    Current Outpatient Medications on File Prior to Encounter   Medication Sig Dispense Refill    doxycycline hyclate (VIBRA-TABS) 100 MG tablet Take 1 tablet by mouth 2 times daily 60 tablet 0    ibuprofen (ADVIL;MOTRIN) 200 MG tablet Take 200 mg by mouth every 6 hours as needed for Pain      triamcinolone (KENALOG) 0.1 % ointment Apply topically 2 times daily (Patient not taking: Reported on 10/24/2022) 453.6 g 1    warfarin (COUMADIN) 5 MG tablet TAKE ONE AND ONE-HALF TABLETS BY MOUTH DAILY EXCEPT TAKE 2 TABLETS DAILY ON FRIDAY 180 tablet 1    Compression Stockings MISC by Does not apply route Strength 30-40mmHg  Style: Other pull up compression stockings  Extremity: bilateral  Donning aid recommended: No 1 each 0     No current facility-administered medications on file prior to encounter. REVIEW OF SYSTEMS  Review of Systems    Pertinent items are noted in HPI. Objective:      BP (!) 146/83   Pulse 76   Temp 97.1 °F (36.2 °C) (Temporal)   Resp 16     Wt Readings from Last 3 Encounters:   04/25/22 251 lb 6.4 oz (114 kg)   04/18/22 251 lb 6.4 oz (114 kg)   10/27/21 244 lb (110.7 kg)       PHYSICAL EXAM    General Appearance: alert and oriented to person, place and time, well-developed and well-nourished, in no acute distress  Extremities: no cyanosis, no clubbing, and 3 + edema-  right lower extremity with presence of allograft overlying full-thickness wound  designated as wound #1 and wound #2  Full-thickness granulating wound with fibrin overlying right posterior knee designated as wound #3  Assessment:      1. Chronic acquired lymphedema    2. Idiopathic chronic venous hypertension of right lower extremity with ulcer ([address]    3. Noncompliance         Procedure Note  Indications:  Based on my examination of this patient's wound(s) today, sharp excision is required to promote healing and evaluate the extent healing.     Performed by: Hilario Munoz Sylvain Graham MD    Consent obtained? Yes    Time out taken: Yes    Pain Control: Anesthetic: 4% Lidocaine Liquid Topical     Debridement:Excisional Debridement    Using curette the wound was sharply debrided    down through and including the removal of  epidermis, dermis, and subcutaneous tissue. Devitalized Tissue Debrided:  fibrin and biofilm      Pre Debridement Measurements:  Are located in the Wound Documentation Flow Sheet   Wound #: 3     Post  Debridement Measurements:  Wound 07/25/22 Leg Right; Lower;Medial #1 (Active)   Wound Image   10/03/22 1116   Wound Etiology Venous 07/25/22 1040   Wound Cleansed Cleansed with saline 11/21/22 1110   Dressing/Treatment Foam impregnated with Ag 11/21/22 1139   Wound Length (cm) 2 cm 11/21/22 1110   Wound Width (cm) 2 cm 11/21/22 1110   Wound Depth (cm) 0.1 cm 11/21/22 1110   Wound Surface Area (cm^2) 4 cm^2 11/21/22 1110   Change in Wound Size % (l*w) 83.09 11/21/22 1110   Wound Volume (cm^3) 0.4 cm^3 11/21/22 1110   Wound Healing % 83 11/21/22 1110   Post-Procedure Length (cm) 2 cm 11/21/22 1128   Post-Procedure Width (cm) 2 cm 11/21/22 1128   Post-Procedure Depth (cm) 0.1 cm 11/21/22 1128   Post-Procedure Surface Area (cm^2) 4 cm^2 11/21/22 1128   Post-Procedure Volume (cm^3) 0.4 cm^3 11/21/22 1128   Distance Tunneling (cm) 0 cm 11/07/22 1201   Undermining Maxium Distance (cm) 0 11/07/22 1201   Wound Assessment Pink/red;Fibrin 11/21/22 1110   Drainage Amount Moderate 11/21/22 1110   Drainage Description Brown 11/21/22 1110   Odor Mild 11/21/22 1110   Kiah-wound Assessment Fragile; Maceration 11/21/22 1110   Margins Undefined edges 11/21/22 1110   Wound Thickness Description not for Pressure Injury Full thickness 11/21/22 1110   Number of days: 119       Wound 07/25/22 Leg Right; Anterior; Lower #2 (Active)   Wound Image   10/03/22 1116   Wound Etiology Venous 07/25/22 1040   Wound Cleansed Cleansed with saline 11/21/22 1110   Dressing/Treatment Foam impregnated with Ag 11/21/22 1139   Wound Length (cm) 1.9 cm 11/21/22 1110   Wound Width (cm) 5.5 cm 11/21/22 1110   Wound Depth (cm) 0.1 cm 11/21/22 1110   Wound Surface Area (cm^2) 10.45 cm^2 11/21/22 1110   Change in Wound Size % (l*w) 77.08 11/21/22 1110   Wound Volume (cm^3) 1.045 cm^3 11/21/22 1110   Wound Healing % 77 11/21/22 1110   Post-Procedure Length (cm) 1.9 cm 11/21/22 1128   Post-Procedure Width (cm) 5.5 cm 11/21/22 1128   Post-Procedure Depth (cm) 0.1 cm 11/21/22 1128   Post-Procedure Surface Area (cm^2) 10.45 cm^2 11/21/22 1128   Post-Procedure Volume (cm^3) 1.045 cm^3 11/21/22 1128   Distance Tunneling (cm) 0 cm 11/07/22 1201   Undermining Maxium Distance (cm) 0 11/07/22 1201   Wound Assessment Graft 11/21/22 1110   Drainage Amount Moderate 11/21/22 1110   Drainage Description Serosanguinous;Brown 11/21/22 1110   Odor Mild 11/21/22 1110   Kiah-wound Assessment Maceration 11/21/22 1110   Margins Defined edges 11/21/22 1110   Wound Thickness Description not for Pressure Injury Full thickness 11/21/22 1110   Number of days: 119       Wound 07/25/22 Leg Right;Posterior; Lower #3 (Active)   Wound Image   10/03/22 1116   Wound Etiology Venous 07/25/22 1040   Wound Cleansed Cleansed with saline 11/21/22 1110   Dressing/Treatment Foam impregnated with Ag 11/21/22 1139   Wound Length (cm) 2.5 cm 11/21/22 1110   Wound Width (cm) 3.5 cm 11/21/22 1110   Wound Depth (cm) 0.1 cm 11/21/22 1110   Wound Surface Area (cm^2) 8.75 cm^2 11/21/22 1110   Change in Wound Size % (l*w) 80.11 11/21/22 1110   Wound Volume (cm^3) 0.875 cm^3 11/21/22 1110   Wound Healing % 80 11/21/22 1110   Post-Procedure Length (cm) 2.6 cm 11/21/22 1128   Post-Procedure Width (cm) 3.6 cm 11/21/22 1128   Post-Procedure Depth (cm) 0.3 cm 11/21/22 1128   Post-Procedure Surface Area (cm^2) 9.36 cm^2 11/21/22 1128   Post-Procedure Volume (cm^3) 2.808 cm^3 11/21/22 1128   Distance Tunneling (cm) 0 cm 11/07/22 1201   Undermining Maxium Distance (cm) 0 11/07/22 1201 Wound Assessment Pink/red;Fibrin 11/21/22 1110   Drainage Amount Moderate 11/21/22 1110   Drainage Description Serosanguinous;Brown 11/21/22 1110   Odor Mild 11/21/22 1110   Kiah-wound Assessment Intact; Maceration 11/21/22 1110   Margins Defined edges 11/21/22 1110   Wound Thickness Description not for Pressure Injury Full thickness 11/21/22 1110   Number of days: 119          Percent of Wound Debrided: 100%    Total Surface Area Debrided:  9.3 sq cm    Diabetic/Pressure/Non Pressure Ulcers only:  Ulcer: Non-Pressure ulcer, fat layer exposed    Bleeding: Minimal    Hemostasis Achieved: by pressure    Procedural Pain: 0  / 10     Post Procedural Pain: 0 / 10     Response to treatment:  Well tolerated by patient. Plan:     Treatment Note: Please see attached Discharge Instructions. These instructions were given and signed by the patient or POA    New Prescriptions    No medications on file       Orders Placed This Encounter   Procedures    Initiate Outpatient Wound Care Protocol       Discharge Instructions          [facility] Physician Orders and Discharge Instructions  [address]  Telephone: [phone]  NAME:  Maki Messina  YOB: 1965  MEDICAL RECORD NUMBER:  [id number]  DATE: 11/21/22     Return Appointment:  Return Appointment: With Dr. Venkat Parson  in  1 [facility])  [] Return Appointment for a Wound Assessment with the nurse on:     Future Appointments   Date Time Provider Eloina Zamarripa   12/5/2022 10:30 AM 6 Rujesus Guido Eloued: none  Medically necessary services: [] Skilled Nursing [] PT (Eval & Treat) [] OT (Eval & Treat) [] Social Work [] Dietician  [] Other:    Wound care instructions: If you smoke we ask that you refrain from smoking. Smoking inhibits wounds from healing. When taking antibiotics take the entire prescription as ordered. Do not stop taking until medication is all gone unless otherwise instructed. Exercise as tolerated. Keep weight off wounds and reposition every 2 hours if applicable. Do not get wounds wet in the bath or shower unless otherwise instructed by your physician. If your wound is on your foot or leg, you may purchase a cast bag. Please ask at the pharmacy. Wash hands with soap and water prior to and after every dressing change. [x]Wash wounds with: Vashe wash - Apply enough Vashe to soak a piece of gauze and place on wound bed for 5-10 minutes. No need to rinse after soaking. [x]Kiah wound Topical Treatments: Do not apply lotions, creams, or ointments to the skin around the wound bed unless directed as followed:   Apply around the wound: Moisturizing lotion, zinc paste         [x]Wound Location: right lower leg   Apply Primary Dressing to wound:  poly mem silver  Secondary Dressing: 4X4 gauze pad   Avoid contact of tape with skin if possible. When to change Dressing: DO NOT CHANGE        [x] Multilayer Compression Wrap:  Type: Applied on Right lower leg(s)  4 Layer Compression Wrap    Do not get leg(s) with wrap wet. If wraps become too tight call the center or completely remove the wrap. Elevate leg(s) above the level of the heart when sitting. Avoid prolonged standing in one place. Applied in Clinic on 11/21/2022  The Goal of this therapy is to reduce edema and get into long term compression garments to control venous insufficiency, lymphedema and reduce occurrence of venous ulcers    [x] Edema Control:  Apply: Compression Stocking on the Left leg     Elevate leg(s) above the level of the heart for 30 minutes 4-5 times a day and/or when sitting. Avoid prolonged standing in one place. Dietary:  Important dietary reminders:  1. Increase Protein intake (i.e. Lean meats, fish, eggs, legumes, and yogurt)  2. No added salt  3.  If diabetic, follow a diabetic diet and check glucose prior to meals or as instructed by your physician. Dietary Supplements(Take twice a day unless instructed otherwise):  [] Silvana Bellis  [] 30ml ProStat [] Eudelia Fillers [] Ensure Max/Premier [] Other:    Your nurse  is:  Pastora Notice     Electronically signed by Chapo Mayorga RN on 11/21/2022 at 11:34 AM     [address] Information: Should you experience any significant changes in your wound(s) or have questions about your wound care, please contact the [address] [place] [address] at [phone]. We are open from 8:00am - 4:30p Monday, Thursday and Friday; 11:00am - 5pm on Tuesday and CLOSED Wednesday. Please give us 24-48 business hours to return your call. Call your doctor now or seek immediate medical care if:    You have symptoms of infection, such as: Increased pain, swelling, warmth, or redness. Red streaks leading from the area. Pus draining from the area. A fever.          [] Patient unable to sign Discharge Instructions given to ECF/Transportation/POA         Electronically signed by Elia Keenan MD on 11/21/2022 at 12:21 PM

## 2022-11-21 NOTE — PROGRESS NOTES
Multilayer Compression Wrap   (Not Unna) Below the Knee    NAME:  Mackenzie Wooten  YOB: 1965  MEDICAL RECORD NUMBER:  [id number]  DATE:  11/21/2022       Removed old Multilayer wrap if present and washed leg with mild soap/water. Applied moisturizing agent to dry skin as needed. Applied primary and secondary dressing as ordered    Applied multilayered dressing below the knee to Right lower leg(s)  (4 Layer Compression Wrap ) . Instructed patient/caregiver not to remove dressing and to keep it clean and dry. Instructed patient/caregiver on complications to report to provider, such as pain, numbness in toes, heavy drainage, and slippage of dressing. Instructed patient on purpose of compression dressing and on activity and exercise recommendations.    Applied per   Guidelines    Electronically signed by Nadira Oconnor RN on 11/21/2022 at 11:40 AM

## 2022-11-29 ENCOUNTER — HOSPITAL ENCOUNTER (OUTPATIENT)
Dept: WOUND CARE | Age: 57
Discharge: HOME | End: 2022-11-29
Payer: MEDICAID

## 2022-11-29 VITALS
RESPIRATION RATE: 18 BRPM | DIASTOLIC BLOOD PRESSURE: 80 MMHG | SYSTOLIC BLOOD PRESSURE: 148 MMHG | HEART RATE: 93 BPM | TEMPERATURE: 97.1 F

## 2022-11-29 DIAGNOSIS — I89.0 CHRONIC ACQUIRED LYMPHEDEMA: Primary | ICD-10-CM

## 2022-11-29 DIAGNOSIS — L97.919 IDIOPATHIC CHRONIC VENOUS HYPERTENSION OF RIGHT LOWER EXTREMITY WITH ULCER (HCC): ICD-10-CM

## 2022-11-29 DIAGNOSIS — Z91.199 NONCOMPLIANCE: ICD-10-CM

## 2022-11-29 DIAGNOSIS — I87.311 IDIOPATHIC CHRONIC VENOUS HYPERTENSION OF RIGHT LOWER EXTREMITY WITH ULCER (HCC): ICD-10-CM

## 2022-11-29 PROCEDURE — 29581 APPL MULTLAYER CMPRN SYS LEG: CPT

## 2022-11-29 ASSESSMENT — PAIN DESCRIPTION - DESCRIPTORS: DESCRIPTORS: BURNING

## 2022-11-29 ASSESSMENT — PAIN DESCRIPTION - LOCATION: LOCATION: LEG

## 2022-11-29 ASSESSMENT — PAIN DESCRIPTION - ORIENTATION: ORIENTATION: RIGHT

## 2022-11-29 ASSESSMENT — PAIN SCALES - GENERAL: PAINLEVEL_OUTOF10: 3

## 2022-11-29 NOTE — PROGRESS NOTES
Multilayer Compression Wrap   (Not Unna) Below the Knee    NAME:  Keyshawn Walsh  YOB: 1965  MEDICAL RECORD NUMBER:  [id number]  DATE:  11/29/2022       Removed old Multilayer wrap if present and washed leg with mild soap/water. Applied moisturizing agent to dry skin as needed. Applied primary and secondary dressing as ordered    Applied multilayered dressing below the knee to Right lower leg(s)  (4 Layer Compression Wrap ) . Instructed patient/caregiver not to remove dressing and to keep it clean and dry. Instructed patient/caregiver on complications to report to provider, such as pain, numbness in toes, heavy drainage, and slippage of dressing. Instructed patient on purpose of compression dressing and on activity and exercise recommendations.    Applied per   Guidelines    Electronically signed by Lj Archibald RN on 11/29/2022 at 1:47 PM

## 2022-11-29 NOTE — DISCHARGE INSTRUCTIONS
[address] [facility] Physician Orders and Discharge Instructions  The [address] [address] [place] [address] [address] [facility]  Telephone: [phone]    NAME:  Caitie Hernández:  1965  MEDICAL RECORD NUMBER:  [id number]  DATE:  11/29/2022      Wound care:  Continue to follow the instructions and recommendations from your last doctor visit. The dressing(s) applied is the same as your last visit. Please refer to your last discharge instruction for the information on your wound care. If there were any changes made, please follow the instructions as written here: keep dry, up and intact    Future Appointments     Future Appointments   Date Time Provider Eloina Zamarripa   12/1/2022  8:30 AM Miquel Connolly MD  [facility]   12/5/2022 10:30 AM Mike Dominguez MM [facility]           Your nurse  is:  Jacquelin Rose     Electronically signed by Trish Wadsworth RN on 11/29/2022 at [facility]. Information: Should you experience any significant changes in your wound(s) or have questions about your wound care, please contact the [address] [place] [address] at [phone]. Our hours vary so please leave a message. Please give us 24-48 hours to return your call. If you need help with your wounds and cannot wait until we are available, contact your PCP or go to the hospital emergency room. Physician orders by:  Dr. Tere Yan        The information contained in the After Visit Summary has been reviewed with me, the patient and/or responsible adult, by my health care provider(s). I had the opportunity to ask questions regarding this information. I have elected to receive;      [] Patient unable to sign Discharge Instructions.  Given to ECF/Transportation/POA

## 2022-12-05 ENCOUNTER — HOSPITAL ENCOUNTER (OUTPATIENT)
Dept: WOUND CARE | Age: 57
Discharge: HOME | End: 2022-12-05
Payer: COMMERCIAL

## 2022-12-05 ENCOUNTER — ANTI-COAG VISIT (OUTPATIENT)
Dept: PHARMACY | Age: 57
End: 2022-12-05
Payer: COMMERCIAL

## 2022-12-05 VITALS
RESPIRATION RATE: 16 BRPM | HEART RATE: 68 BPM | SYSTOLIC BLOOD PRESSURE: 153 MMHG | DIASTOLIC BLOOD PRESSURE: 87 MMHG | TEMPERATURE: 97.4 F

## 2022-12-05 DIAGNOSIS — I89.0 CHRONIC ACQUIRED LYMPHEDEMA: Primary | ICD-10-CM

## 2022-12-05 DIAGNOSIS — I82.5Z1 CHRONIC VENOUS EMBOLISM AND THROMBOSIS OF DEEP VESSELS OF DISTAL END OF RIGHT LOWER EXTREMITY (HCC): Primary | ICD-10-CM

## 2022-12-05 DIAGNOSIS — I87.311 IDIOPATHIC CHRONIC VENOUS HYPERTENSION OF RIGHT LOWER EXTREMITY WITH ULCER (HCC): ICD-10-CM

## 2022-12-05 DIAGNOSIS — Z91.199 NONCOMPLIANCE: ICD-10-CM

## 2022-12-05 DIAGNOSIS — L97.919 IDIOPATHIC CHRONIC VENOUS HYPERTENSION OF RIGHT LOWER EXTREMITY WITH ULCER (HCC): ICD-10-CM

## 2022-12-05 LAB — INTERNATIONAL NORMALIZATION RATIO, POC: 2.5

## 2022-12-05 PROCEDURE — 15272 SKIN SUB GRAFT T/A/L ADD-ON: CPT

## 2022-12-05 PROCEDURE — 29581 APPL MULTLAYER CMPRN SYS LEG: CPT

## 2022-12-05 PROCEDURE — 15271 SKIN SUB GRAFT TRNK/ARM/LEG: CPT | Performed by: SPECIALIST

## 2022-12-05 PROCEDURE — 11042 DBRDMT SUBQ TIS 1ST 20SQCM/<: CPT

## 2022-12-05 PROCEDURE — 15271 SKIN SUB GRAFT TRNK/ARM/LEG: CPT

## 2022-12-05 PROCEDURE — 85610 PROTHROMBIN TIME: CPT | Performed by: PHARMACIST

## 2022-12-05 PROCEDURE — 6370000000 HC RX 637 (ALT 250 FOR IP): Performed by: SPECIALIST

## 2022-12-05 PROCEDURE — 15272 SKIN SUB GRAFT T/A/L ADD-ON: CPT | Performed by: SPECIALIST

## 2022-12-05 PROCEDURE — 11046 DBRDMT MUSC&/FSCA EA ADDL: CPT

## 2022-12-05 PROCEDURE — 99211 OFF/OP EST MAY X REQ PHY/QHP: CPT | Performed by: PHARMACIST

## 2022-12-05 RX ORDER — GINSENG 100 MG
CAPSULE ORAL ONCE
OUTPATIENT
Start: 2022-12-05 | End: 2022-12-05

## 2022-12-05 RX ORDER — LIDOCAINE HYDROCHLORIDE 40 MG/ML
SOLUTION TOPICAL ONCE
OUTPATIENT
Start: 2022-12-05 | End: 2022-12-05

## 2022-12-05 RX ORDER — LIDOCAINE 40 MG/G
CREAM TOPICAL ONCE
OUTPATIENT
Start: 2022-12-05 | End: 2022-12-05

## 2022-12-05 RX ORDER — BACITRACIN, NEOMYCIN, POLYMYXIN B 400; 3.5; 5 [USP'U]/G; MG/G; [USP'U]/G
OINTMENT TOPICAL ONCE
OUTPATIENT
Start: 2022-12-05 | End: 2022-12-05

## 2022-12-05 RX ORDER — GENTAMICIN SULFATE 1 MG/G
OINTMENT TOPICAL ONCE
OUTPATIENT
Start: 2022-12-05 | End: 2022-12-05

## 2022-12-05 RX ORDER — LIDOCAINE HYDROCHLORIDE 20 MG/ML
JELLY TOPICAL ONCE
OUTPATIENT
Start: 2022-12-05 | End: 2022-12-05

## 2022-12-05 RX ORDER — DOXYCYCLINE HYCLATE 100 MG
100 TABLET ORAL 2 TIMES DAILY
Qty: 60 TABLET | Refills: 0 | Status: SHIPPED | OUTPATIENT
Start: 2022-12-05 | End: 2023-01-04

## 2022-12-05 RX ORDER — DOXYCYCLINE HYCLATE 100 MG/1
100 CAPSULE ORAL 2 TIMES DAILY
COMMUNITY
Start: 2022-12-05 | End: 2023-01-03

## 2022-12-05 RX ORDER — LIDOCAINE 50 MG/G
OINTMENT TOPICAL ONCE
OUTPATIENT
Start: 2022-12-05 | End: 2022-12-05

## 2022-12-05 RX ORDER — LIDOCAINE HYDROCHLORIDE 40 MG/ML
SOLUTION TOPICAL ONCE
Status: COMPLETED | OUTPATIENT
Start: 2022-12-05 | End: 2022-12-05

## 2022-12-05 RX ORDER — BACITRACIN ZINC AND POLYMYXIN B SULFATE 500; 1000 [USP'U]/G; [USP'U]/G
OINTMENT TOPICAL ONCE
OUTPATIENT
Start: 2022-12-05 | End: 2022-12-05

## 2022-12-05 RX ORDER — CLOBETASOL PROPIONATE 0.5 MG/G
OINTMENT TOPICAL ONCE
OUTPATIENT
Start: 2022-12-05 | End: 2022-12-05

## 2022-12-05 RX ORDER — BETAMETHASONE DIPROPIONATE 0.05 %
OINTMENT (GRAM) TOPICAL ONCE
OUTPATIENT
Start: 2022-12-05 | End: 2022-12-05

## 2022-12-05 RX ADMIN — LIDOCAINE HYDROCHLORIDE: 40 SOLUTION TOPICAL at 12:08

## 2022-12-05 ASSESSMENT — PAIN DESCRIPTION - DESCRIPTORS: DESCRIPTORS: BURNING

## 2022-12-05 ASSESSMENT — PAIN DESCRIPTION - ORIENTATION: ORIENTATION: RIGHT

## 2022-12-05 ASSESSMENT — PAIN SCALES - GENERAL: PAINLEVEL_OUTOF10: 3

## 2022-12-05 ASSESSMENT — PAIN DESCRIPTION - PAIN TYPE: TYPE: CHRONIC PAIN

## 2022-12-05 ASSESSMENT — PAIN DESCRIPTION - LOCATION: LOCATION: LEG

## 2022-12-05 NOTE — DISCHARGE INSTRUCTIONS
215 [facility] Physician Orders and Discharge Instructions  [address] [place] [address] [address] [address] [facility] [address]  Telephone: [phone]  NAME:  Artur Santiago  YOB: 1965  MEDICAL RECORD NUMBER:  [id number]  DATE: 12/5/22     Return Appointment:  Return Appointment: With Dr. Zaida Cazares  in  1 [facility])  [] Return Appointment for a Wound Assessment with the nurse on:     Future Appointments   Date Time Provider Eloina Zamarripa   1/9/2023 10:30 AM 6 Rujesus Guido Ellaylaed: none  Medically necessary services: [] Skilled Nursing [] PT (Eval & Treat) [] OT (Eval & Treat) [] Social Work [] Dietician  [] Other:    Wound care instructions: If you smoke we ask that you refrain from smoking. Smoking inhibits wounds from healing. When taking antibiotics take the entire prescription as ordered. Do not stop taking until medication is all gone unless otherwise instructed. Exercise as tolerated. Keep weight off wounds and reposition every 2 hours if applicable. Do not get wounds wet in the bath or shower unless otherwise instructed by your physician. If your wound is on your foot or leg, you may purchase a cast bag. Please ask at the pharmacy. Wash hands with soap and water prior to and after every dressing change. [x]Wash wounds with: Vashe wash - Apply enough Vashe to soak a piece of gauze and place on wound bed for 5-10 minutes. No need to rinse after soaking.   [x]Kiah wound Topical Treatments: Do not apply lotions, creams, or ointments to the skin around the wound bed unless directed as followed:   Apply around the wound: No-Sting barrier film           [x]Application of a biologic skin substitute: Yes: Theraskin : Wound Location: #1, #2, #3 applied covered with mepitel, steri strips, hydrogel, gauze  This is a highly specialized wound care dressing that is intended to enhance your own bodies ability to increase growth factors and other healing abilities. Please leave the dressing clean, dry and intact unless otherwise instructed by your physician. Leave steri-strips and non adherent in place if changing the dressing as instructed. []Instructions for [address] [facility] if applicable:       [x] Multilayer Compression Wrap:  Type: Applied on Right lower leg(s)  4 Layer Compression Wrap    Do not get leg(s) with wrap wet. If wraps become too tight call the center or completely remove the wrap. Elevate leg(s) above the level of the heart when sitting. Avoid prolonged standing in one place. Applied in Clinic on 12/5/2022  The Goal of this therapy is to reduce edema and get into long term compression garments to control venous insufficiency, lymphedema and reduce occurrence of venous ulcers    [x] Edema Control:  Apply: Compression Stocking on the Left leg  Apply every morning immediately when getting up. Remove every night before going to bed unless instructed otherwise     Elevate leg(s) above the level of the heart for 30 minutes 4-5 times a day and/or when sitting. Avoid prolonged standing in one place. [x] Lymphedema Therapy: stop for 1 week   Wear Lymphedema pumps twice a day at settings prescribed by your physician. Avoid prolonged standing in one place. Dietary:  Important dietary reminders:  1. Increase Protein intake (i.e. Lean meats, fish, eggs, legumes, and yogurt)  2. No added salt  3. If diabetic, follow a diabetic diet and check glucose prior to meals or as instructed by your physician.     Dietary Supplements(Take twice a day unless instructed otherwise):  [] Fausto Bamberger  [] 30ml ProStat [] Gissel Salmons [] Ensure Max/Premier [] Other:    Your nurse  is:  Cherise Leslie     Electronically signed by Ruby Robertson RN on 12/5/2022 at 12:32 PM     [address] [facility] [address] Information: Should you experience any significant changes in your wound(s) or have questions about your wound care, please contact the [address] [place] [address] at [phone]. We are open from 8:00am - 4:30p Monday, Thursday and Friday; 11:00am - 5pm on Tuesday and CLOSED Wednesday. Please give us 24-48 business hours to return your call. Call your doctor now or seek immediate medical care if:    You have symptoms of infection, such as: Increased pain, swelling, warmth, or redness. Red streaks leading from the area. Pus draining from the area. A fever.          [] Patient unable to sign Discharge Instructions given to ECF/Transportation/POA

## 2022-12-05 NOTE — PROGRESS NOTES
[facility] [place]  Progress Note and Procedure Note      Keyshawn Walsh  MEDICAL RECORD NUMBER:  [id number]  AGE: 62 y.o. GENDER: male  : 1965  EPISODE DATE:  2022    Subjective:     Chief Complaint   Patient presents with    Wound Check     Right lower leg           HISTORY of PRESENT ILLNESS HPI     Keyshawn Walsh is a 62 y.o. male who presents today for wound/ulcer evaluation. History of Wound Context: Well-known to the wound care center having been treated for years for chronic venous insufficiency with open wounds and significant lymphedema. He has been lost to follow-up for almost 1 year. He returns today with multiple open wounds of the right lower extremity with massive edema. He states he has been utilizing his lymphedema pumps daily. There has been no breakthrough drainage from the compression wraps. He has resumed his doxycycline therapy. He is now 3 week post application of TheraSkin allograft    Wound has failed to heal greater than 30%-50% at initial assessment.   Patient has received greater than 30 days of conservative treatment including debridements at each wound care visit and the following: Compression    Pain Assessment:  Wound/Ulcer Pain Timing/Severity: none  Quality of pain: N/A  Severity:  0 / 10   Modifying Factors: None  Associated Signs/Symptoms: edema and drainage    Ulcer Identification:  Ulcer Type: venous  Contributing Factors: edema, venous stasis, lymphedema, obesity, and anticoagulation therapy    Last O0V if applicable: No results found for: LABA1C    Objective:      BP (!) 153/87   Pulse 68   Temp 97.4 °F (36.3 °C) (Temporal)   Resp 16     Wt Readings from Last 3 Encounters:   22 251 lb 6.4 oz (114 kg)   22 251 lb 6.4 oz (114 kg)   10/27/21 244 lb (110.7 kg)       PHYSICAL EXAM    General Appearance: alert and oriented to person, place and time, in no acute distress, and obese  Extremities: no cyanosis, no clubbing, 3 + edema- right lower extremity with 3 full-thickness granulating ulcers with minimal fibrin overlying posterior medial and anterior knee designated as #1 #2 #3. Periwound with dry flaky skin    Assessment:      1. Chronic acquired lymphedema    2. Idiopathic chronic venous hypertension of right lower extremity with ulcer ([address]    3. Noncompliance         Procedure Note  Indications:  Based on my examination of this patient's wound(s) today, sharp excision is required to promote healing and evaluate the extent healing. Performed by: Maykel Loya MD    Consent obtained? Yes    Time out taken: Yes    Pain Control: Anesthetic: 4% Lidocaine Liquid Topical     Debridement:Excisional Debridement    Using curette the wound was sharply debrided    down through and including the removal of  epidermis, dermis, and subcutaneous tissue. Devitalized Tissue Debrided:  fibrin and biofilm      Wound #: 1, 2, and 3     Pre & Post  Debridement Measurements:  Wound 07/25/22 Leg Right; Lower;Medial #1 (Active)   Wound Image   12/05/22 1151   Wound Etiology Venous 07/25/22 1040   Wound Cleansed Cleansed with saline 12/05/22 1151   Dressing/Treatment Other (comment); Hydrating gel 12/05/22 1224   Wound Length (cm) 4.5 cm 12/05/22 1151   Wound Width (cm) 3.7 cm 12/05/22 1151   Wound Depth (cm) 0.1 cm 12/05/22 1151   Wound Surface Area (cm^2) 16.65 cm^2 12/05/22 1151   Change in Wound Size % (l*w) 29.6 12/05/22 1151   Wound Volume (cm^3) 1.665 cm^3 12/05/22 1151   Wound Healing % 30 12/05/22 1151   Post-Procedure Length (cm) 4.6 cm 12/05/22 1224   Post-Procedure Width (cm) 3.8 cm 12/05/22 1224   Post-Procedure Depth (cm) 0.3 cm 12/05/22 1224   Post-Procedure Surface Area (cm^2) 17.48 cm^2 12/05/22 1224   Post-Procedure Volume (cm^3) 5.244 cm^3 12/05/22 1224   Distance Tunneling (cm) 0 cm 12/05/22 1151   Tunneling Position ___ O'Clock 0 11/29/22 1339   Undermining Starts ___ O'Clock 0 11/29/22 1339   Undermining Ends___ O'Clock 0 11/29/22 1339   Undermining Maxium Distance (cm) 0 12/05/22 1151   Wound Assessment Fibrin;Pink/red 12/05/22 1151   Drainage Amount Moderate 12/05/22 1151   Drainage Description Brown 12/05/22 1151   Odor Mild 12/05/22 1151   Kiah-wound Assessment Intact; Maceration 12/05/22 1151   Margins Defined edges 12/05/22 1151   Wound Thickness Description not for Pressure Injury Full thickness 12/05/22 1151   Number of days: 133       Wound 07/25/22 Leg Right; Anterior; Lower #2 (Active)   Wound Image   12/05/22 1151   Wound Etiology Venous 07/25/22 1040   Wound Cleansed Cleansed with saline 12/05/22 1151   Dressing/Treatment Hydrating gel 12/05/22 1224   Wound Length (cm) 1.8 cm 12/05/22 1151   Wound Width (cm) 5.1 cm 12/05/22 1151   Wound Depth (cm) 0.1 cm 12/05/22 1151   Wound Surface Area (cm^2) 9.18 cm^2 12/05/22 1151   Change in Wound Size % (l*w) 79.87 12/05/22 1151   Wound Volume (cm^3) 0.918 cm^3 12/05/22 1151   Wound Healing % 80 12/05/22 1151   Post-Procedure Length (cm) 1.9 cm 12/05/22 1224   Post-Procedure Width (cm) 5.2 cm 12/05/22 1224   Post-Procedure Depth (cm) 0.3 cm 12/05/22 1224   Post-Procedure Surface Area (cm^2) 9.88 cm^2 12/05/22 1224   Post-Procedure Volume (cm^3) 2.964 cm^3 12/05/22 1224   Distance Tunneling (cm) 0 cm 12/05/22 1151   Tunneling Position ___ O'Clock 0 11/29/22 1339   Undermining Starts ___ O'Clock 0 11/29/22 1339   Undermining Ends___ O'Clock 0 11/29/22 1339   Undermining Maxium Distance (cm) 0 12/05/22 1151   Wound Assessment Graft;Pink/red 12/05/22 1151   Drainage Amount Moderate 12/05/22 1151   Drainage Description Brown 12/05/22 1151   Odor Mild 12/05/22 1151   Kiah-wound Assessment Intact; Maceration 12/05/22 1151   Margins Defined edges 12/05/22 1151   Wound Thickness Description not for Pressure Injury Full thickness 12/05/22 1151   Number of days: 133       Wound 07/25/22 Leg Right;Posterior; Lower #3 (Active)   Wound Image   12/05/22 1151   Wound Etiology Venous 07/25/22 1040   Wound Cleansed Cleansed with saline 12/05/22 1151   Dressing/Treatment Hydrating gel 12/05/22 1224   Wound Length (cm) 2.5 cm 12/05/22 1151   Wound Width (cm) 3.5 cm 12/05/22 1151   Wound Depth (cm) 0.1 cm 12/05/22 1151   Wound Surface Area (cm^2) 8.75 cm^2 12/05/22 1151   Change in Wound Size % (l*w) 80.11 12/05/22 1151   Wound Volume (cm^3) 0.875 cm^3 12/05/22 1151   Wound Healing % 80 12/05/22 1151   Post-Procedure Length (cm) 2.6 cm 12/05/22 1224   Post-Procedure Width (cm) 3.6 cm 12/05/22 1224   Post-Procedure Depth (cm) 0.3 cm 12/05/22 1224   Post-Procedure Surface Area (cm^2) 9.36 cm^2 12/05/22 1224   Post-Procedure Volume (cm^3) 2.808 cm^3 12/05/22 1224   Distance Tunneling (cm) 0 cm 12/05/22 1151   Tunneling Position ___ O'Clock 0 11/29/22 1339   Undermining Starts ___ O'Clock 0 11/29/22 1339   Undermining Ends___ O'Clock 0 11/29/22 1339   Undermining Maxium Distance (cm) 0 12/05/22 1151   Wound Assessment Fibrin;Pink/red 12/05/22 1151   Drainage Amount Moderate 12/05/22 1151   Drainage Description Serosanguinous 12/05/22 1151   Odor Mild 12/05/22 1151   Kiah-wound Assessment Intact 12/05/22 1151   Margins Defined edges 12/05/22 1151   Wound Thickness Description not for Pressure Injury Full thickness 12/05/22 1151   Number of days: 133          Percent of Wound Debrided: 100%    Total Surface Area Debrided:  36 sq cm    Diabetic/Pressure/Non Pressure Ulcers only:  Ulcer: Non-Pressure ulcer, fat layer exposed    Bleeding: Minimal    Hemostasis Achieved: by pressure    Procedural Pain: 0  / 10     Post Procedural Pain: 0 / 10     Response to treatment:  Well tolerated by patient. rocedure:  Skin Substitute Application    Performed by: Lani Grimaldo MD    Ulcer Type: venous    Consent obtained: Yes    Time out taken: Yes    Product Utilized:     Other TheraSkin 26 cm     Fenestrated: No    Mesher Utilized: No    Instrument(s) curette    Skin Substitute was Applied to Ulcer Number(s):    Ulcer #: 1, 2, and 3    Wound 07/25/22 Leg Right; Lower;Medial #1 (Active)   Wound Image   12/05/22 1151   Wound Etiology Venous 07/25/22 1040   Wound Cleansed Cleansed with saline 12/05/22 1151   Dressing/Treatment Other (comment); Hydrating gel 12/05/22 1224   Wound Length (cm) 4.5 cm 12/05/22 1151   Wound Width (cm) 3.7 cm 12/05/22 1151   Wound Depth (cm) 0.1 cm 12/05/22 1151   Wound Surface Area (cm^2) 16.65 cm^2 12/05/22 1151   Change in Wound Size % (l*w) 29.6 12/05/22 1151   Wound Volume (cm^3) 1.665 cm^3 12/05/22 1151   Wound Healing % 30 12/05/22 1151   Post-Procedure Length (cm) 4.6 cm 12/05/22 1224   Post-Procedure Width (cm) 3.8 cm 12/05/22 1224   Post-Procedure Depth (cm) 0.3 cm 12/05/22 1224   Post-Procedure Surface Area (cm^2) 17.48 cm^2 12/05/22 1224   Post-Procedure Volume (cm^3) 5.244 cm^3 12/05/22 1224   Distance Tunneling (cm) 0 cm 12/05/22 1151   Tunneling Position ___ O'Clock 0 11/29/22 1339   Undermining Starts ___ O'Clock 0 11/29/22 1339   Undermining Ends___ O'Clock 0 11/29/22 1339   Undermining Maxium Distance (cm) 0 12/05/22 1151   Wound Assessment Fibrin;Pink/red 12/05/22 1151   Drainage Amount Moderate 12/05/22 1151   Drainage Description Brown 12/05/22 1151   Odor Mild 12/05/22 1151   Kiah-wound Assessment Intact; Maceration 12/05/22 1151   Margins Defined edges 12/05/22 1151   Wound Thickness Description not for Pressure Injury Full thickness 12/05/22 1151   Number of days: 133       Wound 07/25/22 Leg Right; Anterior; Lower #2 (Active)   Wound Image   12/05/22 1151   Wound Etiology Venous 07/25/22 1040   Wound Cleansed Cleansed with saline 12/05/22 1151   Dressing/Treatment Hydrating gel 12/05/22 1224   Wound Length (cm) 1.8 cm 12/05/22 1151   Wound Width (cm) 5.1 cm 12/05/22 1151   Wound Depth (cm) 0.1 cm 12/05/22 1151   Wound Surface Area (cm^2) 9.18 cm^2 12/05/22 1151   Change in Wound Size % (l*w) 79.87 12/05/22 1151   Wound Volume (cm^3) 0.918 cm^3 12/05/22 1151   Wound Healing % 80 12/05/22 1151 Post-Procedure Length (cm) 1.9 cm 12/05/22 1224   Post-Procedure Width (cm) 5.2 cm 12/05/22 1224   Post-Procedure Depth (cm) 0.3 cm 12/05/22 1224   Post-Procedure Surface Area (cm^2) 9.88 cm^2 12/05/22 1224   Post-Procedure Volume (cm^3) 2.964 cm^3 12/05/22 1224   Distance Tunneling (cm) 0 cm 12/05/22 1151   Tunneling Position ___ O'Clock 0 11/29/22 1339   Undermining Starts ___ O'Clock 0 11/29/22 1339   Undermining Ends___ O'Clock 0 11/29/22 1339   Undermining Maxium Distance (cm) 0 12/05/22 1151   Wound Assessment Graft;Pink/red 12/05/22 1151   Drainage Amount Moderate 12/05/22 1151   Drainage Description Brown 12/05/22 1151   Odor Mild 12/05/22 1151   Kiah-wound Assessment Intact; Maceration 12/05/22 1151   Margins Defined edges 12/05/22 1151   Wound Thickness Description not for Pressure Injury Full thickness 12/05/22 1151   Number of days: 133       Wound 07/25/22 Leg Right;Posterior; Lower #3 (Active)   Wound Image   12/05/22 1151   Wound Etiology Venous 07/25/22 1040   Wound Cleansed Cleansed with saline 12/05/22 1151   Dressing/Treatment Hydrating gel 12/05/22 1224   Wound Length (cm) 2.5 cm 12/05/22 1151   Wound Width (cm) 3.5 cm 12/05/22 1151   Wound Depth (cm) 0.1 cm 12/05/22 1151   Wound Surface Area (cm^2) 8.75 cm^2 12/05/22 1151   Change in Wound Size % (l*w) 80.11 12/05/22 1151   Wound Volume (cm^3) 0.875 cm^3 12/05/22 1151   Wound Healing % 80 12/05/22 1151   Post-Procedure Length (cm) 2.6 cm 12/05/22 1224   Post-Procedure Width (cm) 3.6 cm 12/05/22 1224   Post-Procedure Depth (cm) 0.3 cm 12/05/22 1224   Post-Procedure Surface Area (cm^2) 9.36 cm^2 12/05/22 1224   Post-Procedure Volume (cm^3) 2.808 cm^3 12/05/22 1224   Distance Tunneling (cm) 0 cm 12/05/22 1151   Tunneling Position ___ O'Clock 0 11/29/22 1339   Undermining Starts ___ O'Clock 0 11/29/22 1339   Undermining Ends___ O'Clock 0 11/29/22 1339   Undermining Maxium Distance (cm) 0 12/05/22 1151   Wound Assessment Fibrin;Pink/red 12/05/22 1151 Drainage Amount Moderate 12/05/22 1151   Drainage Description Serosanguinous 12/05/22 1151   Odor Mild 12/05/22 1151   Kiah-wound Assessment Intact 12/05/22 1151   Margins Defined edges 12/05/22 1151   Wound Thickness Description not for Pressure Injury Full thickness 12/05/22 1151   Number of days: 133          Diabetic/Pressure/Non Pressure Ulcers:  Ulcer:   Non-Pressure ulcer, fat layer exposed      Total Surface Area of Ulcer(s) Covered 26 sq/cm    Was the Product Layered  No     Amount of Product Applied 26 sq/cm     Amount of Product Wasted 0 sq/cm     Reason for Waste: N/A     Surgically Fixated: No:  Secured With: Steri Strips and Silicone Dressing     Procedural Pain: 0/10     Post Procedural Pain: 0 / 10    Response to Treatment: Well tolerated by patient. Second application of TheraSkin was applied without difficulty as deemed medically necessary      Plan:     Treatment Note: Please see attached Discharge Instructions. These instructions were given and signed by the patient or POA    New Medication(s) at this visit:   New Prescriptions    DOXYCYCLINE HYCLATE (VIBRA-TABS) 100 MG TABLET    Take 1 tablet by mouth 2 times daily       Discharge Instructions          [facility] Physician Orders and Discharge Instructions  [address] [place] [address]  Telephone: [phone]  NAME:  Mackenzie Wooten  YOB: 1965  MEDICAL RECORD NUMBER:  [id number]  DATE: 12/5/22     Return Appointment:  Return Appointment: With Dr. Bernadette Lechuga  in  [facility])  [] Return Appointment for a Wound Assessment with the nurse on:     Future Appointments   Date Time Provider Eloina Zamarripa   1/9/2023 10:30 AM 6 Christine Guido Eloued: none  Medically necessary services: [] Skilled Nursing [] PT (Eval & Treat) [] OT (Eval & Treat) [] Social Work [] Dietician  [] Other:    Wound care instructions:   If you smoke we ask that you refrain from smoking. Smoking inhibits wounds from healing. When taking antibiotics take the entire prescription as ordered. Do not stop taking until medication is all gone unless otherwise instructed. Exercise as tolerated. Keep weight off wounds and reposition every 2 hours if applicable. Do not get wounds wet in the bath or shower unless otherwise instructed by your physician. If your wound is on your foot or leg, you may purchase a cast bag. Please ask at the pharmacy. Wash hands with soap and water prior to and after every dressing change. [x]Wash wounds with: Vashe wash - Apply enough Vashe to soak a piece of gauze and place on wound bed for 5-10 minutes. No need to rinse after soaking. [x]Kiah wound Topical Treatments: Do not apply lotions, creams, or ointments to the skin around the wound bed unless directed as followed:   Apply around the wound: No-Sting barrier film           [x]Application of a biologic skin substitute: Yes: Theraskin : Wound Location: #1, #2, #3 applied covered with mepitel, steri strips, hydrogel, gauze  This is a highly specialized wound care dressing that is intended to enhance your own bodies ability to increase growth factors and other healing abilities. Please leave the dressing clean, dry and intact unless otherwise instructed by your physician. Leave steri-strips and non adherent in place if changing the dressing as instructed. []Instructions for [facility] if applicable:       [x] Multilayer Compression Wrap:  Type: Applied on Right lower leg(s)  4 Layer Compression Wrap    Do not get leg(s) with wrap wet. If wraps become too tight call the center or completely remove the wrap. Elevate leg(s) above the level of the heart when sitting. Avoid prolonged standing in one place.    Applied in Clinic on 12/5/2022  The Goal of this therapy is to reduce edema and get into long term compression garments to control venous insufficiency, lymphedema and reduce occurrence of venous ulcers    [x] Edema Control:  Apply: Compression Stocking on the Left leg  Apply every morning immediately when getting up. Remove every night before going to bed unless instructed otherwise     Elevate leg(s) above the level of the heart for 30 minutes 4-5 times a day and/or when sitting. Avoid prolonged standing in one place. [x] Lymphedema Therapy: stop for 1 week   Wear Lymphedema pumps twice a day at settings prescribed by your physician. Avoid prolonged standing in one place. Dietary:  Important dietary reminders:  1. Increase Protein intake (i.e. Lean meats, fish, eggs, legumes, and yogurt)  2. No added salt  3. If diabetic, follow a diabetic diet and check glucose prior to meals or as instructed by your physician. Dietary Supplements(Take twice a day unless instructed otherwise):  [] [place] Gamma  [] 30ml ProStat [] Elizabeth Heys [] Ensure Max/Premier [] Other:    Your nurse  is:  Eva Smliey     Electronically signed by Janusz Felipe RN on 12/5/2022 at 12:32 PM     [address] [place] [address] Information: Should you experience any significant changes in your wound(s) or have questions about your wound care, please contact the [address] [place] [address] at [phone]. We are open from 8:00am - 4:30p Monday, Thursday and Friday; 11:00am - 5pm on Tuesday and CLOSED Wednesday. Please give us 24-48 business hours to return your call. Call your doctor now or seek immediate medical care if:    You have symptoms of infection, such as: Increased pain, swelling, warmth, or redness. Red streaks leading from the area. Pus draining from the area. A fever.          [] Patient unable to sign Discharge Instructions given to ECF/Transportation/POA         Electronically signed by Felix Goodrich MD on 12/5/2022 at 1:17 PM

## 2022-12-05 NOTE — PROGRESS NOTES
ANTICOAGULATION SERVICE    Maximino Baez is a 62 y.o. male with PMHx significant for chronic DVT (last in Jan, 2019) who presents to clinic 12/5/2022 for anticoagulation monitoring and adjustment. Patient has been taking warfarin for 15+ years.      Anticoagulation Indication(s):  DVT    Referring Physician:  Dr. Mickie Conrad  Goal INR Range:  2-3  Duration of Anticoagulation Therapy:  Indefinite  Time of day dose taken:  AM  Product patient has at home:  warfarin 5 mg (peach)    INR Summary                            Warfarin regimen (mg)  Date INR   A/P    Sun Mon Tue Wed Thu Fri Sat Mg/wk  12/5 2.5 At goal, continue  7.5 5 7.5 7.5 7.5 5 7.5 47.5  11/7 2.3 At goal, continue  7.5 5 7.5 7.5 7.5 5 7.5 47.5  10/10 2.9 At goal, continue  7.5 5 7.5 7.5 7.5 5 7.5 47.5  7/27 3.1 Above goal, continue    7.5 5 7.5 7.5 7.5 5 7.5 47.5  7/6 3.1 Above goal, decrease  7.5 5 7.5 7.5 7.5 5 7.5 47.5  6/22 2.4 At goal, resume prv dose 7.5 7.5 7.5 7.5 7.5 5 7.5 50  6/1 2.6 At goal, continue   7.5 5 7.5 5 7.5 5 7.5 45  5/18 4.1 Above goal, dec (abx)  7.5 5 7.5 5 7.5 5 7.5 45  4/6 2.8 At goal, continue  7.5 7.5 7.5 7.5 7.5 5 7.5 50  12/1 2.9 At goal, continue  7.5 7.5 7.5 7.5 7.5 5 7.5 50  11/10 2.4 At goal, continue   7.5 7.5 7.5 7.5 7.5 5 7.5 50  10/27 4.4 Above goal, hold+dec  7.5 7.5 7.5 7.5 0/7.5 5 7.5 50  8/25 2.9 At goal, continue  7.5 7.5 7.5 7.5 7.5 7.5 7.5 52.5  7/14 2.5 At goal, continue   7.5 7.5 7.5 7.5 7.5 7.5 7.5 52.5  6/14 2.9 At goal, continue  7.5 7.5 7.5 7.5 0/7.5 7.5 7.5 52.5  5/26 3.9 Above goal (ABX), hold x 1 7.5 7.5 7.5 7.5 0/7.5 7.5 7.5 52.5  3/31 2.5 At goal, no change  7.5 7.5 7.5 7.5 7.5 7.5 7.5 52.5  2/17 3.0 At goal, no change  7.5 7.5 7.5 7.5 7.5 7.5 7.5 52.5  1/6 2.8 At goal, no change  7.5 7.5 7.5 7.5 7.5 7.5 7.5 52.5  12/2 2.9 At goal, no change  7.5 7.5 7.5 7.5 7.5 7.5 7.5 52.5  11/4 3.1 Above goal, continue  7.5 7.5 7.5 7.5 7.5 7.5 7.5 52.5  10/7 2.7 At goal, no change  7.5 7.5 7.5 7.5 7.5 7.5 7.5 52.5  9/9 2.7 At goal, no change  7.5 7.5 7.5 7.5 7.5 7.5 7.5 52.5  8/17 2.8 At goal, no change  7.5 7.5 7.5 7.5 7.5 7.5 7.5 52.5  8/3 3.4 Above goal, decrease  7.5 7.5 7.5 7.5 7.5 7.5 7.5 52.5  6/8 2.9 At goal, no change  7.5 7.5 7.5 7.5 7.5 10 7.5 55  2/28 2.7 At goal, no change  7.5 7.5 7.5 7.5 7.5 10 7.5 55  1/3 2.6 At goal, no change  7.5 7.5 7.5 7.5 7.5 10 7.5 55  11/20 2.8 At goal, no change  7.5 7.5 7.5 7.5 7.5 10 7.5 55  10/23 2.6 At goal, no change  7.5 7.5 7.5 7.5 7.5 10 7.5 55  8/23 2.2 At goal, no change  7.5 7.5 7.5 7.5 7.5 10 7.5 55  7/26 2.5 At goal, no change  7.5 7.5 7.5 7.5 7.5 10 7.5 55  6/28 2.3 At goal, no change  7.5 7.5 7.5 7.5 7.5 10 7.5 55  5/31 2.6 At goal, no change  7.5 7.5 7.5 7.5 7.5 10 7.5 55  5/1 2.5 At goal, no change  7.5 7.5 7.5 7.5 7.5 10 7.5 55  4/10 2.0 At goal, no change  7.5 7.5 7.5 7.5 7.5 10 7.5 55  3/13 2.6 At goal, no change  7.5 7.5 7.5 7.5 7.5 10 7.5 55  2/27 2.7 At goal, no change  7.5 7.5 7.5 7.5 7.5 10 7.5 55  2/20 2.9 At goal, no change  7.5 7.5 7.5 7.5 7.5 10 7.5 55  2/13 2.1 At goal, no change  7.5 7.5 7.5 7.5 7.5 10 7.5 55    Last CBC:  Lab Results   Component Value Date    RBC 4.28 05/03/2022    HGB 10.8 (L) 05/03/2022    HCT 33.3 (L) 05/03/2022    MCV 77.7 (L) 05/03/2022    MCH 25.3 (L) 05/03/2022    MPV 6.5 05/03/2022    RDW 17.2 (H) 05/03/2022     05/03/2022     Patient History:  Recent hospitalizations/HC visits 5/9/22: podiatry continue cipro+clinda x6 more weeks  7/28/21 ID: patient will potentially need IV abx.  Continue following with wound care    Recent medication changes 5/2/22-current: ciprofloxacin 500 mg BID + clindamycin 300 mg TID (anticipated stop 6/20/22)    Medications taken regularly that may interact with warfarin or alter INR None reported   Warfarin dose taken as prescribed Yes - does not use pillbox (only takes warfarin)   Signs/symptoms of bleeding No h/o bleeding reported   Vitamin K intake Normally avoids high vitamin K foods: ~0-1 serving per week   Recent vomiting/diarrhea/fever, changes in weight or activity level None reported   Tobacco or alcohol use Patient reports smoking 0 PPD  Patient reports having 0 drinks per day   Upcoming surgeries or procedures None reported     Assessment/Plan:  Patient's INR was therapeutic again today (2.5). Patient has been on doxycycline long term, but recently stopped for a couple of weeks and restarted. This drug can increase the INR, but does not appear to have affected his INR in the past. Patient denies any missed/extra warfarin doses, diet changes, illness, bleeding, etc since last visit. Patient was instructed to continue warfarin to 7.5 mg daily except 5 mg on Monday and Friday. Repeat INR in 5 weeks due to the holiday. Patient prefers to extend interval between appointments. But he is now seeing wound care every Monday and will try to coordinate appts. Patient was reminded to maintain consistent vitamin K intake and call with any bleeding, medication changes, or fever/vomiting/diarrhea. Patient understands dosing directions and information discussed. Dosing schedule and follow up appointment given to patient. Progress note routed to referring physician's office. Patient acknowledges working in consult agreement with pharmacist as referred by his/her physician. Next Clinic Appointment:  1/9     Please call [facility] at [phone] with any questions. Thanks!   Parul Yanez, PharmD, [facility]  [facility]  [place]: [phone]  [place]: [phone]  12/5/2022 10:43 AM        For Pharmacy Admin Tracking Only    Total # of Interventions Recommended: 0  Total # of Interventions Accepted: 0  Time Spent (min): 15

## 2022-12-05 NOTE — PROGRESS NOTES
TheraSkin Treatment Note      Goal:  Patient will receive safe and proper application of skin substitute. Patient will comply with caring for dressing, offloading and reporting complications. [x] Expiration date of TheraSkin checked immediately prior to use. [x] Package intact prior to use and no damage noted. [x] Transport temperature controlled and acceptable. TheraSkin was prepared for application by removing from package. TheraSkin was rinsed 2 times in room temperature normal saline for 5 seconds each time. A 2nd saline rinse was left on the TheraSkin until the provider was ready to apply it within 120 minutes of thawing. White side placed against ulcer bed. [x] Theraskin was applied to wound # 1, 2, and 3 and affixed with steri-strips by the provider. All right lower leg wounds  [x] Secondary dressing applied as ordered. [x] Patient/caregiver was instructed not to remove dressing and to keep it clean and dry. See AVS for further instructions given to patient/caregiver. Theraskin may be applied a total of 10 times per wound over a 12 week period. Additionally Theraskin may only be used every 12 months per wound. Date of first application of Theraskin for this current wound is November 18, 2022.      Application # 2           Guidelines followed      Electronically signed by Yudith Alcazar RN on 12/5/2022 at 12:31 PM

## 2022-12-05 NOTE — PROGRESS NOTES
Multilayer Compression Wrap   (Not Unna) Below the Knee    NAME:  Sanju Vallejo  YOB: 1965  MEDICAL RECORD NUMBER:  [id number]  DATE:  12/5/2022       Removed old Multilayer wrap if present and washed leg with mild soap/water. Applied moisturizing agent to dry skin as needed. Applied primary and secondary dressing as ordered    Applied multilayered dressing below the knee to Right lower leg(s)  (4 Layer Compression Wrap ) . Instructed patient/caregiver not to remove dressing and to keep it clean and dry. Instructed patient/caregiver on complications to report to provider, such as pain, numbness in toes, heavy drainage, and slippage of dressing. Instructed patient on purpose of compression dressing and on activity and exercise recommendations.    Applied per   Guidelines    Electronically signed by Kim David RN on 12/5/2022 at 12:36 PM

## 2022-12-12 ENCOUNTER — HOSPITAL ENCOUNTER (OUTPATIENT)
Dept: WOUND CARE | Age: 57
Discharge: HOME | End: 2022-12-12
Payer: COMMERCIAL

## 2022-12-12 VITALS
RESPIRATION RATE: 16 BRPM | HEART RATE: 80 BPM | TEMPERATURE: 96.8 F | DIASTOLIC BLOOD PRESSURE: 90 MMHG | SYSTOLIC BLOOD PRESSURE: 142 MMHG

## 2022-12-12 DIAGNOSIS — I89.0 CHRONIC ACQUIRED LYMPHEDEMA: Primary | ICD-10-CM

## 2022-12-12 DIAGNOSIS — L97.919 IDIOPATHIC CHRONIC VENOUS HYPERTENSION OF RIGHT LOWER EXTREMITY WITH ULCER (HCC): ICD-10-CM

## 2022-12-12 DIAGNOSIS — Z91.199 NONCOMPLIANCE: ICD-10-CM

## 2022-12-12 DIAGNOSIS — I87.311 IDIOPATHIC CHRONIC VENOUS HYPERTENSION OF RIGHT LOWER EXTREMITY WITH ULCER (HCC): ICD-10-CM

## 2022-12-12 PROCEDURE — 6370000000 HC RX 637 (ALT 250 FOR IP): Performed by: SPECIALIST

## 2022-12-12 PROCEDURE — 99212 OFFICE O/P EST SF 10 MIN: CPT | Performed by: SPECIALIST

## 2022-12-12 PROCEDURE — 99213 OFFICE O/P EST LOW 20 MIN: CPT

## 2022-12-12 PROCEDURE — 29581 APPL MULTLAYER CMPRN SYS LEG: CPT

## 2022-12-12 RX ORDER — LIDOCAINE HYDROCHLORIDE 20 MG/ML
JELLY TOPICAL ONCE
OUTPATIENT
Start: 2022-12-12 | End: 2022-12-12

## 2022-12-12 RX ORDER — LIDOCAINE 50 MG/G
OINTMENT TOPICAL ONCE
OUTPATIENT
Start: 2022-12-12 | End: 2022-12-12

## 2022-12-12 RX ORDER — BACITRACIN, NEOMYCIN, POLYMYXIN B 400; 3.5; 5 [USP'U]/G; MG/G; [USP'U]/G
OINTMENT TOPICAL ONCE
OUTPATIENT
Start: 2022-12-12 | End: 2022-12-12

## 2022-12-12 RX ORDER — LIDOCAINE 40 MG/G
CREAM TOPICAL ONCE
OUTPATIENT
Start: 2022-12-12 | End: 2022-12-12

## 2022-12-12 RX ORDER — LIDOCAINE HYDROCHLORIDE 40 MG/ML
SOLUTION TOPICAL ONCE
OUTPATIENT
Start: 2022-12-12 | End: 2022-12-12

## 2022-12-12 RX ORDER — BACITRACIN ZINC AND POLYMYXIN B SULFATE 500; 1000 [USP'U]/G; [USP'U]/G
OINTMENT TOPICAL ONCE
OUTPATIENT
Start: 2022-12-12 | End: 2022-12-12

## 2022-12-12 RX ORDER — GENTAMICIN SULFATE 1 MG/G
OINTMENT TOPICAL ONCE
OUTPATIENT
Start: 2022-12-12 | End: 2022-12-12

## 2022-12-12 RX ORDER — BETAMETHASONE DIPROPIONATE 0.05 %
OINTMENT (GRAM) TOPICAL ONCE
OUTPATIENT
Start: 2022-12-12 | End: 2022-12-12

## 2022-12-12 RX ORDER — CLOBETASOL PROPIONATE 0.5 MG/G
OINTMENT TOPICAL ONCE
OUTPATIENT
Start: 2022-12-12 | End: 2022-12-12

## 2022-12-12 RX ORDER — GINSENG 100 MG
CAPSULE ORAL ONCE
OUTPATIENT
Start: 2022-12-12 | End: 2022-12-12

## 2022-12-12 RX ORDER — LIDOCAINE HYDROCHLORIDE 40 MG/ML
SOLUTION TOPICAL ONCE
Status: COMPLETED | OUTPATIENT
Start: 2022-12-12 | End: 2022-12-12

## 2022-12-12 RX ADMIN — LIDOCAINE HYDROCHLORIDE: 40 SOLUTION TOPICAL at 10:39

## 2022-12-12 ASSESSMENT — PAIN DESCRIPTION - FREQUENCY: FREQUENCY: CONTINUOUS

## 2022-12-12 ASSESSMENT — PAIN DESCRIPTION - DESCRIPTORS: DESCRIPTORS: BURNING

## 2022-12-12 ASSESSMENT — PAIN SCALES - GENERAL: PAINLEVEL_OUTOF10: 4

## 2022-12-12 ASSESSMENT — PAIN DESCRIPTION - ORIENTATION: ORIENTATION: RIGHT

## 2022-12-12 ASSESSMENT — PAIN DESCRIPTION - PAIN TYPE: TYPE: CHRONIC PAIN

## 2022-12-12 ASSESSMENT — PAIN DESCRIPTION - LOCATION: LOCATION: LEG

## 2022-12-12 NOTE — PROGRESS NOTES
[address] [facility] - [place], [place]  Progress Note and Procedure Note      Justin Baeza  AGE: 62 y.o. GENDER: male  : 1965  EPISODE DATE:  2022      Subjective:     Chief Complaint   Patient presents with    Wound Check     Right lower leg         HISTORY of PRESENT ILLNESS HPI     Mariah Alexander is a 62 y.o. male who presents today for wound evaluation. History of Wound Context: Well-known to the wound care center having been treated for years for chronic venous insufficiency with open wounds and significant lymphedema. He has been lost to follow-up for almost 1 year. He returns today with multiple open wounds of the right lower extremity with massive edema. He states he has been utilizing his lymphedema pumps daily. There has been no breakthrough drainage from the compression wraps. He has resumed his doxycycline therapy.   He is now 1 week post application of second TheraSkin allograft  Wound Pain Timing/Severity: none  Quality of pain: N/A  Severity:  0 / 10   Modifying Factors: None  Associated Signs/Symptoms: edema and drainage    Wound Identification:  Wound Type: venous  Contributing Factors: edema, venous stasis, lymphedema, obesity, and anticoagulation therapy        PAST MEDICAL HISTORY        Diagnosis Date    DVT (deep venous thrombosis) (HCC)     Hx of blood clots     Pain and swelling of right lower leg        PAST SURGICAL HISTORY    Past Surgical History:   Procedure Laterality Date    BALLOON ANGIOPLASTY, ARTERY Right 2017    at [address] [facility] [place], [place], ESOPHAGUS      SKIN SPLIT GRAFT Right        FAMILY HISTORY    Family History   Problem Relation Age of Onset    Diabetes Mother     Heart Attack Father        SOCIAL HISTORY    Social History     Tobacco Use    Smoking status: Never    Smokeless tobacco: Never   Vaping Use    Vaping Use: Never used   Substance Use Topics    Alcohol use: No    Drug use: No       ALLERGIES    No Known Allergies    MEDICATIONS    Current Outpatient Medications on File Prior to Encounter   Medication Sig Dispense Refill    doxycycline hyclate (VIBRA-TABS) 100 MG tablet Take 1 tablet by mouth 2 times daily 60 tablet 0    ibuprofen (ADVIL;MOTRIN) 200 MG tablet Take 200 mg by mouth every 6 hours as needed for Pain      triamcinolone (KENALOG) 0.1 % ointment Apply topically 2 times daily (Patient not taking: No sig reported) 453.6 g 1    warfarin (COUMADIN) 5 MG tablet TAKE ONE AND ONE-HALF TABLETS BY MOUTH DAILY EXCEPT TAKE 2 TABLETS DAILY ON FRIDAY 180 tablet 1    Compression Stockings MISC by Does not apply route Strength 30-40mmHg  Style: Other pull up compression stockings  Extremity: bilateral  Donning aid recommended: No 1 each 0     No current facility-administered medications on file prior to encounter. REVIEW OF SYSTEMS    Pertinent items are noted in HPI. Objective:      BP (!) 142/90   Pulse 80   Temp 96.8 °F (36 °C) (Temporal)   Resp 16     PHYSICAL EXAM    General Appearance: alert and oriented to person, place and time, well-developed and well-nourished, in no acute distress  Extremities: no cyanosis, no clubbing, 3 + edema-  right lower extremity, and 3 full-thickness granulating ulcers with TheraSkin allograft in place. Mild periwound maceration        Assessment:     1. Chronic acquired lymphedema    2. Idiopathic chronic venous hypertension of right lower extremity with ulcer ([address]    3. Noncompliance          Wound Measurements:  Wound 07/25/22 Leg Right; Lower;Medial #1 (Active)   Wound Image   12/05/22 1151   Wound Etiology Venous 07/25/22 1040   Wound Cleansed Cleansed with saline 12/12/22 1038   Dressing/Treatment Foam impregnated with Ag 12/12/22 1106   Wound Length (cm) 4.5 cm 12/12/22 1038   Wound Width (cm) 3.7 cm 12/12/22 1038   Wound Depth (cm) 0.1 cm 12/12/22 1038   Wound Surface Area (cm^2) 16.65 cm^2 12/12/22 1038   Change in Wound Size % (l*w) 29.6 12/12/22 1038   Wound Volume (cm^3) 1.665 cm^3 12/12/22 1038   Wound Healing % 30 12/12/22 1038   Post-Procedure Length (cm) 4.5 cm 12/12/22 1057   Post-Procedure Width (cm) 3.7 cm 12/12/22 1057   Post-Procedure Depth (cm) 0.1 cm 12/12/22 1057   Post-Procedure Surface Area (cm^2) 16.65 cm^2 12/12/22 1057   Post-Procedure Volume (cm^3) 1.665 cm^3 12/12/22 1057   Distance Tunneling (cm) 0 cm 12/05/22 1151   Tunneling Position ___ O'Clock 0 11/29/22 1339   Undermining Starts ___ O'Clock 0 11/29/22 1339   Undermining Ends___ O'Clock 0 11/29/22 1339   Undermining Maxium Distance (cm) 0 12/05/22 1151   Wound Assessment Graft 12/12/22 1038   Drainage Amount Moderate 12/12/22 1038   Drainage Description Green 12/12/22 1038   Odor Mild 12/12/22 1038   Kiah-wound Assessment Maceration;Erosion 12/12/22 1038   Margins Defined edges 12/12/22 1038   Wound Thickness Description not for Pressure Injury Full thickness 12/12/22 1038   Number of days: 140       Wound 07/25/22 Leg Right; Anterior; Lower #2 (Active)   Wound Image   12/05/22 1151   Wound Etiology Venous 07/25/22 1040   Wound Cleansed Cleansed with saline 12/12/22 1038   Dressing/Treatment Foam impregnated with Ag 12/12/22 1106   Wound Length (cm) 1.8 cm 12/12/22 1038   Wound Width (cm) 5.1 cm 12/12/22 1038   Wound Depth (cm) 0.1 cm 12/12/22 1038   Wound Surface Area (cm^2) 9.18 cm^2 12/12/22 1038   Change in Wound Size % (l*w) 79.87 12/12/22 1038   Wound Volume (cm^3) 0.918 cm^3 12/12/22 1038   Wound Healing % 80 12/12/22 1038   Post-Procedure Length (cm) 1.8 cm 12/12/22 1057   Post-Procedure Width (cm) 5.1 cm 12/12/22 1057   Post-Procedure Depth (cm) 0.1 cm 12/12/22 1057   Post-Procedure Surface Area (cm^2) 9.18 cm^2 12/12/22 1057   Post-Procedure Volume (cm^3) 0.918 cm^3 12/12/22 1057   Distance Tunneling (cm) 0 cm 12/05/22 1151   Tunneling Position ___ O'Clock 0 11/29/22 1339   Undermining Starts ___ O'Clock 0 11/29/22 1339   Undermining Ends___ O'Clock 0 11/29/22 1339   Undermining Maxium Distance (cm) 0 12/05/22 1151 Wound Assessment Graft 12/12/22 1038   Drainage Amount Moderate 12/12/22 1038   Drainage Description Green 12/12/22 1038   Odor Mild 12/12/22 1038   Kiah-wound Assessment Maceration 12/12/22 1038   Margins Defined edges 12/05/22 1151   Wound Thickness Description not for Pressure Injury Full thickness 12/05/22 1151   Number of days: 140       Wound 07/25/22 Leg Right;Posterior; Lower #3 (Active)   Wound Image   12/05/22 1151   Wound Etiology Venous 07/25/22 1040   Wound Cleansed Cleansed with saline 12/12/22 1038   Dressing/Treatment Foam impregnated with Ag 12/12/22 1106   Wound Length (cm) 2.5 cm 12/12/22 1038   Wound Width (cm) 3.5 cm 12/12/22 1038   Wound Depth (cm) 0.1 cm 12/12/22 1038   Wound Surface Area (cm^2) 8.75 cm^2 12/12/22 1038   Change in Wound Size % (l*w) 80.11 12/12/22 1038   Wound Volume (cm^3) 0.875 cm^3 12/12/22 1038   Wound Healing % 80 12/12/22 1038   Post-Procedure Length (cm) 2.5 cm 12/12/22 1057   Post-Procedure Width (cm) 3.5 cm 12/12/22 1057   Post-Procedure Depth (cm) 0.1 cm 12/12/22 1057   Post-Procedure Surface Area (cm^2) 8.75 cm^2 12/12/22 1057   Post-Procedure Volume (cm^3) 0.875 cm^3 12/12/22 1057   Distance Tunneling (cm) 0 cm 12/05/22 1151   Tunneling Position ___ O'Clock 0 11/29/22 1339   Undermining Starts ___ O'Clock 0 11/29/22 1339   Undermining Ends___ O'Clock 0 11/29/22 1339   Undermining Maxium Distance (cm) 0 12/05/22 1151   Wound Assessment Graft 12/12/22 1038   Drainage Amount Moderate 12/12/22 1038   Drainage Description Green 12/12/22 1038   Odor Mild 12/12/22 1038   Kiah-wound Assessment Maceration 12/12/22 1038   Margins Defined edges 12/12/22 1038   Wound Thickness Description not for Pressure Injury Full thickness 12/12/22 1038   Number of days: 140          Plan:     Treatment Note please see attached Discharge Instructions    New Prescriptions    No medications on file       Orders Placed This Encounter   Procedures    Initiate Outpatient Wound Care Protocol Written patient dismissal instructions given to patient and signed by patient or POA. Discharge [id number] [facility] Physician Orders and Discharge Instructions  [id number] [address] [facility]. [place]. [place]  Telephone: [phone]  NAME:  Adin Gregory  YOB: 1965  MEDICAL RECORD NUMBER:  [id number]  DATE: 12/12/22     Return Appointment:  Return Appointment: With Dr. Yandel Miles  in  1 [facility])  [] Return Appointment for a Wound Assessment with the nurse on:     Future Appointments   Date Time Provider Eloina Zamarripa   1/9/2023 10:30 AM 6 Rujesus Guido Eloued: none  Medically necessary services: [] Skilled Nursing [] PT (Eval & Treat) [] OT (Eval & Treat) [] Social Work [] Dietician  [] Other:    Wound care instructions: If you smoke we ask that you refrain from smoking. Smoking inhibits wounds from healing. When taking antibiotics take the entire prescription as ordered. Do not stop taking until medication is all gone unless otherwise instructed. Exercise as tolerated. Keep weight off wounds and reposition every 2 hours if applicable. Do not get wounds wet in the bath or shower unless otherwise instructed by your physician. If your wound is on your foot or leg, you may purchase a cast bag. Please ask at the pharmacy. Wash hands with soap and water prior to and after every dressing change. [x]Wash wounds with: 0.9% normal saline  [x]Kiah wound Topical Treatments: Do not apply lotions, creams, or ointments to the skin around the wound bed unless directed as followed:   Apply around the wound: Zinc paste         [x]Wound Location: right lower leg   Apply Primary Dressing to wound:  polymem silver  Secondary Dressing: 4X4 gauze pad   Avoid contact of tape with skin if possible.   When to change Dressing: DO NOT CHANGE    [x] Multilayer Compression Wrap:  Type: Applied on Right lower leg(s)  4 Layer Compression Wrap    Do not get leg(s) with wrap wet. If wraps become too tight call the center or completely remove the wrap. Elevate leg(s) above the level of the heart when sitting. Avoid prolonged standing in one place. Applied in Clinic on 12/12/2022  The Goal of this therapy is to reduce edema and get into long term compression garments to control venous insufficiency, lymphedema and reduce occurrence of venous ulcers    [x] Edema Control:  Apply: Compression Stocking on the Left leg     Elevate leg(s) above the level of the heart for 30 minutes 4-5 times a day and/or when sitting. Avoid prolonged standing in one place. [x] Lymphedema Therapy:  Wear Lymphedema pumps twice a day at settings prescribed by your physician. Avoid prolonged standing in one place. Dietary:  Important dietary reminders:  1. Increase Protein intake (i.e. Lean meats, fish, eggs, legumes, and yogurt)  2. No added salt  3. If diabetic, follow a diabetic diet and check glucose prior to meals or as instructed by your physician. Dietary Supplements(Take twice a day unless instructed otherwise):  [] Marcello Stare  [] 30ml ProStat [] Valle Norris [] Ensure Max/Premier [] Other:    Your nurse  is:  Katelyn Carrillo     Electronically signed by Trish Wadsworth RN on 12/12/2022 at 11:00 [address]: Should you experience any significant changes in your wound(s) or have questions about your wound care, please contact the [address] [place] [address] at [phone]. We are open from 8:00am - 4:30p Monday, Thursday and Friday; 11:00am - 5pm on Tuesday and CLOSED Wednesday. Please give us 24-48 business hours to return your call. Call your doctor now or seek immediate medical care if:    You have symptoms of infection, such as: Increased pain, swelling, warmth, or redness. Red streaks leading from the area. Pus draining from the area. A fever.          [] Patient unable to sign Discharge Instructions given to ECF/Transportation/POA             Electronically signed by Milena Linder MD on 12/12/2022 at 11:52 AM

## 2022-12-12 NOTE — DISCHARGE INSTRUCTIONS
[address] [facility] Physician Orders and Discharge Instructions  [address] [address] [address] [place]  Telephone: [phone]  NAME:  David Clemente  YOB: 1965  MEDICAL RECORD NUMBER:  [id number]  DATE: 12/12/22     Return Appointment:  Return Appointment: With Dr. Rosa Astudillo  in  1 [facility])  [] Return Appointment for a Wound Assessment with the nurse on:     Future Appointments   Date Time Provider Eloina Zamarripa   1/9/2023 10:30 AM 6 Christine Guido Eloued: none  Medically necessary services: [] Skilled Nursing [] PT (Eval & Treat) [] OT (Eval & Treat) [] Social Work [] Dietician  [] Other:    Wound care instructions: If you smoke we ask that you refrain from smoking. Smoking inhibits wounds from healing. When taking antibiotics take the entire prescription as ordered. Do not stop taking until medication is all gone unless otherwise instructed. Exercise as tolerated. Keep weight off wounds and reposition every 2 hours if applicable. Do not get wounds wet in the bath or shower unless otherwise instructed by your physician. If your wound is on your foot or leg, you may purchase a cast bag. Please ask at the pharmacy. Wash hands with soap and water prior to and after every dressing change. [x]Wash wounds with: 0.9% normal saline  [x]Kiah wound Topical Treatments: Do not apply lotions, creams, or ointments to the skin around the wound bed unless directed as followed:   Apply around the wound: Zinc paste         [x]Wound Location: right lower leg   Apply Primary Dressing to wound:  polymem silver  Secondary Dressing: 4X4 gauze pad   Avoid contact of tape with skin if possible. When to change Dressing: DO NOT CHANGE    [x] Multilayer Compression Wrap:  Type: Applied on Right lower leg(s)  4 Layer Compression Wrap    Do not get leg(s) with wrap wet.   If wraps become too tight call the center or completely remove the wrap. Elevate leg(s) above the level of the heart when sitting. Avoid prolonged standing in one place. Applied in Clinic on 12/12/2022  The Goal of this therapy is to reduce edema and get into long term compression garments to control venous insufficiency, lymphedema and reduce occurrence of venous ulcers    [x] Edema Control:  Apply: Compression Stocking on the Left leg     Elevate leg(s) above the level of the heart for 30 minutes 4-5 times a day and/or when sitting. Avoid prolonged standing in one place. [x] Lymphedema Therapy:  Wear Lymphedema pumps twice a day at settings prescribed by your physician. Avoid prolonged standing in one place. Dietary:  Important dietary reminders:  1. Increase Protein intake (i.e. Lean meats, fish, eggs, legumes, and yogurt)  2. No added salt  3. If diabetic, follow a diabetic diet and check glucose prior to meals or as instructed by your physician. Dietary Supplements(Take twice a day unless instructed otherwise):  [] Ligia Revering  [] 30ml ProStat [] Thony Fulling [] Ensure Max/Premier [] Other:    Your nurse  is:  Ninoska Graham     Electronically signed by Theodora Ji RN on 12/12/2022 at 11:00 [address]: Should you experience any significant changes in your wound(s) or have questions about your wound care, please contact the [address] [place] [address] at [phone]. We are open from 8:00am - 4:30p Monday, Thursday and Friday; 11:00am - 5pm on Tuesday and CLOSED Wednesday. Please give us 24-48 business hours to return your call. Call your doctor now or seek immediate medical care if:    You have symptoms of infection, such as: Increased pain, swelling, warmth, or redness. Red streaks leading from the area. Pus draining from the area. A fever.          [] Patient unable to sign Discharge Instructions given to ECF/Transportation/POA

## 2022-12-12 NOTE — PROGRESS NOTES
Multilayer Compression Wrap   (Not Unna) Below the Knee    NAME:  Sarah Joseph  YOB: 1965  MEDICAL RECORD NUMBER:  [id number]  DATE:  12/12/2022       Removed old Multilayer wrap if present and washed leg with mild soap/water. Applied moisturizing agent to dry skin as needed. Applied primary and secondary dressing as ordered    Applied multilayered dressing below the knee to Right lower leg(s)  (4 Layer Compression Wrap ) . Instructed patient/caregiver not to remove dressing and to keep it clean and dry. Instructed patient/caregiver on complications to report to provider, such as pain, numbness in toes, heavy drainage, and slippage of dressing. Instructed patient on purpose of compression dressing and on activity and exercise recommendations.    Applied per   Guidelines    Electronically signed by Mckayla Tineo RN on 12/12/2022 at 11:07 AM

## 2022-12-19 ENCOUNTER — HOSPITAL ENCOUNTER (OUTPATIENT)
Dept: WOUND CARE | Age: 57
Discharge: HOME | End: 2022-12-19
Payer: COMMERCIAL

## 2022-12-19 VITALS
RESPIRATION RATE: 16 BRPM | SYSTOLIC BLOOD PRESSURE: 155 MMHG | DIASTOLIC BLOOD PRESSURE: 86 MMHG | TEMPERATURE: 97 F | HEART RATE: 86 BPM

## 2022-12-19 DIAGNOSIS — L97.919 IDIOPATHIC CHRONIC VENOUS HYPERTENSION OF RIGHT LOWER EXTREMITY WITH ULCER (HCC): ICD-10-CM

## 2022-12-19 DIAGNOSIS — I89.0 CHRONIC ACQUIRED LYMPHEDEMA: Primary | ICD-10-CM

## 2022-12-19 DIAGNOSIS — I87.311 IDIOPATHIC CHRONIC VENOUS HYPERTENSION OF RIGHT LOWER EXTREMITY WITH ULCER (HCC): ICD-10-CM

## 2022-12-19 DIAGNOSIS — Z91.199 NONCOMPLIANCE: ICD-10-CM

## 2022-12-19 PROCEDURE — 29581 APPL MULTLAYER CMPRN SYS LEG: CPT

## 2022-12-19 PROCEDURE — 11045 DBRDMT SUBQ TISS EACH ADDL: CPT

## 2022-12-19 PROCEDURE — 11042 DBRDMT SUBQ TIS 1ST 20SQCM/<: CPT

## 2022-12-19 PROCEDURE — 11042 DBRDMT SUBQ TIS 1ST 20SQCM/<: CPT | Performed by: SPECIALIST

## 2022-12-19 PROCEDURE — 11045 DBRDMT SUBQ TISS EACH ADDL: CPT | Performed by: SPECIALIST

## 2022-12-19 RX ORDER — LIDOCAINE HYDROCHLORIDE 40 MG/ML
SOLUTION TOPICAL ONCE
OUTPATIENT
Start: 2022-12-19 | End: 2022-12-19

## 2022-12-19 RX ORDER — LIDOCAINE HYDROCHLORIDE 40 MG/ML
SOLUTION TOPICAL ONCE
Status: DISCONTINUED | OUTPATIENT
Start: 2022-12-19 | End: 2022-12-20 | Stop reason: HOSPADM

## 2022-12-19 RX ORDER — LIDOCAINE HYDROCHLORIDE 20 MG/ML
JELLY TOPICAL ONCE
OUTPATIENT
Start: 2022-12-19 | End: 2022-12-19

## 2022-12-19 RX ORDER — CLOBETASOL PROPIONATE 0.5 MG/G
OINTMENT TOPICAL ONCE
OUTPATIENT
Start: 2022-12-19 | End: 2022-12-19

## 2022-12-19 RX ORDER — BETAMETHASONE DIPROPIONATE 0.05 %
OINTMENT (GRAM) TOPICAL ONCE
OUTPATIENT
Start: 2022-12-19 | End: 2022-12-19

## 2022-12-19 RX ORDER — LIDOCAINE 40 MG/G
CREAM TOPICAL ONCE
OUTPATIENT
Start: 2022-12-19 | End: 2022-12-19

## 2022-12-19 RX ORDER — GINSENG 100 MG
CAPSULE ORAL ONCE
OUTPATIENT
Start: 2022-12-19 | End: 2022-12-19

## 2022-12-19 RX ORDER — GENTAMICIN SULFATE 1 MG/G
OINTMENT TOPICAL ONCE
OUTPATIENT
Start: 2022-12-19 | End: 2022-12-19

## 2022-12-19 RX ORDER — LIDOCAINE 50 MG/G
OINTMENT TOPICAL ONCE
OUTPATIENT
Start: 2022-12-19 | End: 2022-12-19

## 2022-12-19 RX ORDER — BACITRACIN ZINC AND POLYMYXIN B SULFATE 500; 1000 [USP'U]/G; [USP'U]/G
OINTMENT TOPICAL ONCE
OUTPATIENT
Start: 2022-12-19 | End: 2022-12-19

## 2022-12-19 RX ORDER — BACITRACIN, NEOMYCIN, POLYMYXIN B 400; 3.5; 5 [USP'U]/G; MG/G; [USP'U]/G
OINTMENT TOPICAL ONCE
OUTPATIENT
Start: 2022-12-19 | End: 2022-12-19

## 2022-12-19 ASSESSMENT — PAIN SCALES - GENERAL: PAINLEVEL_OUTOF10: 2

## 2022-12-19 ASSESSMENT — PAIN DESCRIPTION - LOCATION: LOCATION: LEG

## 2022-12-19 NOTE — PROGRESS NOTES
Multilayer Compression Wrap   (Not Unna) Below the Knee    NAME:  Mackenzie Wooten  YOB: 1965  MEDICAL RECORD NUMBER:  [id number]  DATE:  12/19/2022       Removed old Multilayer wrap if present and washed leg with mild soap/water. Applied moisturizing agent to dry skin as needed. Applied primary and secondary dressing as ordered    Applied multilayered dressing below the knee to Right lower leg(s)  (4 Layer Compression Wrap ) . Instructed patient/caregiver not to remove dressing and to keep it clean and dry. Instructed patient/caregiver on complications to report to provider, such as pain, numbness in toes, heavy drainage, and slippage of dressing. Instructed patient on purpose of compression dressing and on activity and exercise recommendations.    Applied per   Guidelines    Electronically signed by Charis Ward RN on 12/19/2022 at 11:35 AM

## 2022-12-19 NOTE — DISCHARGE INSTRUCTIONS
[address] [facility] Physician Orders and Discharge Instructions  [address] [address] [place]  Telephone: [phone]  NAME:  Rylie Castillo  YOB: 1965  MEDICAL RECORD NUMBER:  [id number]  DATE: 12/19/22     Return Appointment:  Return Appointment: With Dr. Jun Jacobs  on Thursday, December 29th,   [x] Return Appointment for a Wound Assessment with the nurse on: 12/23/22    Future Appointments   Date Time Provider Eloina Zamarripa   1/9/2023 10:30 AM 6 Christine Hilliardlaylaed: none  Medically necessary services: [] Skilled Nursing [] PT (Eval & Treat) [] OT (Eval & Treat) [] Social Work [] Dietician  [] Other:    Wound care instructions: If you smoke we ask that you refrain from smoking. Smoking inhibits wounds from healing. When taking antibiotics take the entire prescription as ordered. Do not stop taking until medication is all gone unless otherwise instructed. Exercise as tolerated. Keep weight off wounds and reposition every 2 hours if applicable. Do not get wounds wet in the bath or shower unless otherwise instructed by your physician. If your wound is on your foot or leg, you may purchase a cast bag. Please ask at the pharmacy. Wash hands with soap and water prior to and after every dressing change. [x]Wash wounds with: Vashe wash - Apply enough Vashe to soak a piece of gauze and place on wound bed for 5-10 minutes. No need to rinse after soaking. [x]Kiah wound Topical Treatments: Do not apply lotions, creams, or ointments to the skin around the wound bed unless directed as followed:   Apply around the wound: Zinc paste         [x]Wound Location: right lower leg wounds   Apply Primary Dressing to wound:  polymem  Secondary Dressing: 4X4 gauze pad   Avoid contact of tape with skin if possible.   When to change Dressing: DO NOT CHANGE        [x] Multilayer Compression Wrap:  Type: Applied on Right lower leg(s)  4 Layer Compression Wrap    Do not get leg(s) with wrap wet. If wraps become too tight call the center or completely remove the wrap. Elevate leg(s) above the level of the heart when sitting. Avoid prolonged standing in one place. Applied in Clinic on 12/19/2022  The Goal of this therapy is to reduce edema and get into long term compression garments to control venous insufficiency, lymphedema and reduce occurrence of venous ulcers    [x] Edema Control:  Apply: Compression Stocking on the Left leg  Apply every morning immediately when getting up. Remove every night before going to bed unless instructed otherwise     Elevate leg(s) above the level of the heart for 30 minutes 4-5 times a day and/or when sitting. Avoid prolonged standing in one place. [x] Lymphedema Therapy:  Wear Lymphedema pumps twice a day at settings prescribed by your physician. Avoid prolonged standing in one place. Dietary:  Important dietary reminders:  1. Increase Protein intake (i.e. Lean meats, fish, eggs, legumes, and yogurt)  2. No added salt  3. If diabetic, follow a diabetic diet and check glucose prior to meals or as instructed by your physician. Dietary Supplements(Take twice a day unless instructed otherwise):  [] Ligia Revering  [] 30ml ProStat [] Thony Fulling [] Ensure Max/Premier [] Other:    Your nurse  is:  Ninoska Graham     Electronically signed by Theodora Ji RN on 12/19/2022 at 11:28 AM     [address]: Should you experience any significant changes in your wound(s) or have questions about your wound care, please contact the [address] [place] [address] at [phone]. We are open from 8:00am - 4:30p Monday, Thursday and Friday; 11:00am - 5pm on Tuesday and CLOSED Wednesday. Please give us 24-48 business hours to return your call. Call your doctor now or seek immediate medical care if:    You have symptoms of infection, such as:   Increased pain, swelling, warmth, or redness. Red streaks leading from the area. Pus draining from the area. A fever.          [] Patient unable to sign Discharge Instructions given to ECF/Transportation/POA

## 2022-12-19 NOTE — PROGRESS NOTES
[facility]  Progress Note and Procedure Note      Shalonda Mcintosh  MEDICAL RECORD NUMBER:  [id number]  AGE: 62 y.o. GENDER: male  : 1965  EPISODE DATE:  2022    Subjective:     Chief Complaint   Patient presents with    Wound Check     Right lower leg         HISTORY of PRESENT ILLNESS HPI     Shalonda Mcintosh is a 62 y.o. male who presents today for wound/ulcer evaluation. History of Wound Context: Patient continues follow-up for chronic venous insufficiency with ulceration and significant lymphedema. He is now 2 weeks post TheraSkin allograft application to right lower extremity ulcers. He describes no pain or drainage.   He remains on doxycycline  Wound/Ulcer Pain Timing/Severity: none  Quality of pain: N/A  Severity:  0 / 10   Modifying Factors: None  Associated Signs/Symptoms: edema    Ulcer Identification:  Ulcer Type: venous    Contributing Factors: edema, venous stasis, lymphedema, obesity, and anticoagulation therapy    Acute Wound: N/A    PAST MEDICAL HISTORY        Diagnosis Date    DVT (deep venous thrombosis) (HCC)     Hx of blood clots     Pain and swelling of right lower leg        PAST SURGICAL HISTORY    Past Surgical History:   Procedure Laterality Date    BALLOON ANGIOPLASTY, ARTERY Right 2017    at [facility], ESOPHAGUS      SKIN SPLIT GRAFT Right        FAMILY HISTORY    Family History   Problem Relation Age of Onset    Diabetes Mother     Heart Attack Father        SOCIAL HISTORY    Social History     Tobacco Use    Smoking status: Never    Smokeless tobacco: Never   Vaping Use    Vaping Use: Never used   Substance Use Topics    Alcohol use: No    Drug use: No       ALLERGIES    No Known Allergies    MEDICATIONS    Current Outpatient Medications on File Prior to Encounter   Medication Sig Dispense Refill    doxycycline hyclate (VIBRA-TABS) 100 MG tablet Take 1 tablet by mouth 2 times daily 60 tablet 0    ibuprofen (ADVIL;MOTRIN) 200 MG tablet Take 200 mg by mouth every 6 hours as needed for Pain      triamcinolone (KENALOG) 0.1 % ointment Apply topically 2 times daily (Patient not taking: No sig reported) 453.6 g 1    warfarin (COUMADIN) 5 MG tablet TAKE ONE AND ONE-HALF TABLETS BY MOUTH DAILY EXCEPT TAKE 2 TABLETS DAILY ON FRIDAY 180 tablet 1    Compression Stockings MISC by Does not apply route Strength 30-40mmHg  Style: Other pull up compression stockings  Extremity: bilateral  Donning aid recommended: No 1 each 0     No current facility-administered medications on file prior to encounter. REVIEW OF SYSTEMS  Review of Systems    Pertinent items are noted in HPI. Objective:      BP (!) 155/86   Pulse 86   Temp 97 °F (36.1 °C) (Temporal)   Resp 16     Wt Readings from Last 3 Encounters:   04/25/22 251 lb 6.4 oz (114 kg)   04/18/22 251 lb 6.4 oz (114 kg)   10/27/21 244 lb (110.7 kg)       PHYSICAL EXAM    General Appearance: alert and oriented to person, place and time, well-developed and well-nourished, in no acute distress  Extremities: no cyanosis, no clubbing, 3 + edema-  right lower extremity, and 3 full-thickness granulating wounds containing fibrin and minimal residual TheraSkin allograft and presence of epithelialization margins designated as ulcers #1 #2 and #3. Mild periwound maceration  See new wound measurements below  Assessment:      1. Chronic acquired lymphedema    2. Idiopathic chronic venous hypertension of right lower extremity with ulcer ([address]    3. Noncompliance         Procedure Note  Indications:  Based on my examination of this patient's wound(s) today, sharp excision is required to promote healing and evaluate the extent healing. Performed by: Soumya Mai MD    Consent obtained?  Yes    Time out taken: Yes    Pain Control: Anesthetic: 4% Lidocaine Liquid Topical     Debridement:Excisional Debridement    Using curette the wound was sharply debrided    down through and including the removal of epidermis, dermis, and subcutaneous tissue. Devitalized Tissue Debrided:  fibrin and biofilm      Pre Debridement Measurements:  Are located in the Wound Documentation Flow Sheet   Wound #: 1, 2, and 3     Post  Debridement Measurements:  Wound 07/25/22 Leg Right; Lower;Medial #1 (Active)   Wound Image   12/05/22 1151   Wound Etiology Venous 07/25/22 1040   Wound Cleansed Cleansed with saline 12/19/22 1055   Dressing/Treatment Foam 12/19/22 1133   Wound Length (cm) 3.5 cm 12/19/22 1055   Wound Width (cm) 3.8 cm 12/19/22 1055   Wound Depth (cm) 0.1 cm 12/19/22 1055   Wound Surface Area (cm^2) 13.3 cm^2 12/19/22 1055   Change in Wound Size % (l*w) 43.76 12/19/22 1055   Wound Volume (cm^3) 1.33 cm^3 12/19/22 1055   Wound Healing % 44 12/19/22 1055   Post-Procedure Length (cm) 3.6 cm 12/19/22 1125   Post-Procedure Width (cm) 3.9 cm 12/19/22 1125   Post-Procedure Depth (cm) 0.3 cm 12/19/22 1125   Post-Procedure Surface Area (cm^2) 14.04 cm^2 12/19/22 1125   Post-Procedure Volume (cm^3) 4.212 cm^3 12/19/22 1125   Distance Tunneling (cm) 0 cm 12/05/22 1151   Tunneling Position ___ O'Clock 0 11/29/22 1339   Undermining Starts ___ O'Clock 0 11/29/22 1339   Undermining Ends___ O'Clock 0 11/29/22 1339   Undermining Maxium Distance (cm) 0 12/05/22 1151   Wound Assessment Pink/red;Slough 12/19/22 1055   Drainage Amount Moderate 12/19/22 1055   Drainage Description Brown 12/19/22 1055   Odor Mild 12/19/22 1055   Kiah-wound Assessment Maceration;Erosion 12/19/22 1055   Margins Defined edges 12/19/22 1055   Wound Thickness Description not for Pressure Injury Full thickness 12/19/22 1055   Number of days: 147       Wound 07/25/22 Leg Right; Anterior; Lower #2 (Active)   Wound Image   12/05/22 1151   Wound Etiology Venous 07/25/22 1040   Wound Cleansed Cleansed with saline 12/19/22 1055   Dressing/Treatment Foam 12/19/22 1133   Wound Length (cm) 1.5 cm 12/19/22 1055   Wound Width (cm) 5 cm 12/19/22 1055   Wound Depth (cm) 0.1 cm 12/19/22 1055   Wound Surface Area (cm^2) 7.5 cm^2 12/19/22 1055   Change in Wound Size % (l*w) 83.55 12/19/22 1055   Wound Volume (cm^3) 0.75 cm^3 12/19/22 1055   Wound Healing % 84 12/19/22 1055   Post-Procedure Length (cm) 1.6 cm 12/19/22 1125   Post-Procedure Width (cm) 5.1 cm 12/19/22 1125   Post-Procedure Depth (cm) 0.3 cm 12/19/22 1125   Post-Procedure Surface Area (cm^2) 8.16 cm^2 12/19/22 1125   Post-Procedure Volume (cm^3) 2.448 cm^3 12/19/22 1125   Distance Tunneling (cm) 0 cm 12/05/22 1151   Tunneling Position ___ O'Clock 0 11/29/22 1339   Undermining Starts ___ O'Clock 0 11/29/22 1339   Undermining Ends___ O'Clock 0 11/29/22 1339   Undermining Maxium Distance (cm) 0 12/05/22 1151   Wound Assessment Graft;Slough;Pink/red 12/19/22 1055   Drainage Amount Moderate 12/19/22 1055   Drainage Description Brown 12/19/22 1055   Odor Mild 12/19/22 1055   Kiah-wound Assessment Maceration 12/19/22 1055   Margins Defined edges 12/19/22 1055   Wound Thickness Description not for Pressure Injury Full thickness 12/19/22 1055   Number of days: 147       Wound 07/25/22 Leg Right;Posterior; Lower #3 (Active)   Wound Image   12/05/22 1151   Wound Etiology Venous 07/25/22 1040   Wound Cleansed Cleansed with saline 12/19/22 1055   Dressing/Treatment Foam 12/19/22 1133   Wound Length (cm) 2.5 cm 12/19/22 1055   Wound Width (cm) 3.5 cm 12/19/22 1055   Wound Depth (cm) 0.1 cm 12/19/22 1055   Wound Surface Area (cm^2) 8.75 cm^2 12/19/22 1055   Change in Wound Size % (l*w) 80.11 12/19/22 1055   Wound Volume (cm^3) 0.875 cm^3 12/19/22 1055   Wound Healing % 80 12/19/22 1055   Post-Procedure Length (cm) 2.6 cm 12/19/22 1125   Post-Procedure Width (cm) 3.6 cm 12/19/22 1125   Post-Procedure Depth (cm) 0.3 cm 12/19/22 1125   Post-Procedure Surface Area (cm^2) 9.36 cm^2 12/19/22 1125   Post-Procedure Volume (cm^3) 2.808 cm^3 12/19/22 1125   Distance Tunneling (cm) 0 cm 12/05/22 1151   Tunneling Position ___ O'Clock 0 11/29/22 1174 Undermining Starts ___ O'Clock 0 11/29/22 1339   Undermining Ends___ O'Clock 0 11/29/22 1339   Undermining Maxium Distance (cm) 0 12/05/22 1151   Wound Assessment Fibrin;Graft;Pink/red 12/19/22 1055   Drainage Amount Moderate 12/19/22 1055   Drainage Description Brown 12/19/22 1055   Odor Mild 12/19/22 1055   Kiah-wound Assessment Maceration;Erosion 12/19/22 1055   Margins Undefined edges 12/19/22 1055   Wound Thickness Description not for Pressure Injury Full thickness 12/19/22 1055   Number of days: 147          Percent of Wound Debrided: 100%    Total Surface Area Debrided:  31 sq cm    Diabetic/Pressure/Non Pressure Ulcers only:  Ulcer: Non-Pressure ulcer, fat layer exposed    Bleeding: Minimal    Hemostasis Achieved: by pressure    Procedural Pain: 0  / 10     Post Procedural Pain: 0 / 10     Response to treatment:  Well tolerated by patient. Continued slow improvement in all wound measurements. We will consider application of third TheraSkin allograft next week as deemed medically necessary healing        Plan:     Treatment Note: Please see attached Discharge Instructions. These instructions were given and signed by the patient or POA    New Prescriptions    No medications on file       Orders Placed This Encounter   Procedures    Initiate Outpatient Wound Care Protocol       Discharge Instructions          [address] [facility] Physician Orders and Discharge Instructions  [address] [place] [address]. [facility]  Telephone: [phone]  NAME:  Jeannine Beckford  YOB: 1965  MEDICAL RECORD NUMBER:  [id number]  DATE: 12/19/22     Return Appointment:  Return Appointment: With Dr. Stefan Aguirre  on Thursday, December 29th,   [x] Return Appointment for a Wound Assessment with the nurse on: 12/23/22    Future Appointments   Date Time Provider Eloina Zamarripa   1/9/2023 10:30  [facility]: none  Medically necessary services: [] Skilled Nursing [] PT (Eval & Treat) [] OT (Eval & Treat) [] Social Work [] Dietician  [] Other:    Wound care instructions: If you smoke we ask that you refrain from smoking. Smoking inhibits wounds from healing. When taking antibiotics take the entire prescription as ordered. Do not stop taking until medication is all gone unless otherwise instructed. Exercise as tolerated. Keep weight off wounds and reposition every 2 hours if applicable. Do not get wounds wet in the bath or shower unless otherwise instructed by your physician. If your wound is on your foot or leg, you may purchase a cast bag. Please ask at the pharmacy. Wash hands with soap and water prior to and after every dressing change. [x]Wash wounds with: Vashe wash - Apply enough Vashe to soak a piece of gauze and place on wound bed for 5-10 minutes. No need to rinse after soaking. [x]Kiah wound Topical Treatments: Do not apply lotions, creams, or ointments to the skin around the wound bed unless directed as followed:   Apply around the wound: Zinc paste         [x]Wound Location: right lower leg wounds   Apply Primary Dressing to wound:  polymem  Secondary Dressing: 4X4 gauze pad   Avoid contact of tape with skin if possible. When to change Dressing: DO NOT CHANGE        [x] Multilayer Compression Wrap:  Type: Applied on Right lower leg(s)  4 Layer Compression Wrap    Do not get leg(s) with wrap wet. If wraps become too tight call the center or completely remove the wrap. Elevate leg(s) above the level of the heart when sitting. Avoid prolonged standing in one place. Applied in Clinic on 12/19/2022  The Goal of this therapy is to reduce edema and get into long term compression garments to control venous insufficiency, lymphedema and reduce occurrence of venous ulcers    [x] Edema Control:  Apply: Compression Stocking on the Left leg  Apply every morning immediately when getting up. Remove every night before going to bed unless instructed otherwise     Elevate leg(s) above the level of the heart for 30 minutes 4-5 times a day and/or when sitting. Avoid prolonged standing in one place. [x] Lymphedema Therapy:  Wear Lymphedema pumps twice a day at settings prescribed by your physician. Avoid prolonged standing in one place. Dietary:  Important dietary reminders:  1. Increase Protein intake (i.e. Lean meats, fish, eggs, legumes, and yogurt)  2. No added salt  3. If diabetic, follow a diabetic diet and check glucose prior to meals or as instructed by your physician. Dietary Supplements(Take twice a day unless instructed otherwise):  [] Ashutosh Serra  [] 30ml ProStat [] Riri Brianna [] Ensure Max/Premier [] Other:    Your nurse  is:  Yuriy Hernandez     Electronically signed by Pro Xavier RN on 12/19/2022 at 11:28 AM     [address]: Should you experience any significant changes in your wound(s) or have questions about your wound care, please contact the [address] [place] [address] at [phone]. We are open from 8:00am - 4:30p Monday, Thursday and Friday; 11:00am - 5pm on Tuesday and CLOSED Wednesday. Please give us 24-48 business hours to return your call. Call your doctor now or seek immediate medical care if:    You have symptoms of infection, such as: Increased pain, swelling, warmth, or redness. Red streaks leading from the area. Pus draining from the area. A fever.          [] Patient unable to sign Discharge Instructions given to ECF/Transportation/POA         Electronically signed by Thompson Cantu MD on 12/19/2022 at 12:20 PM

## 2022-12-22 ENCOUNTER — HOSPITAL ENCOUNTER (OUTPATIENT)
Dept: WOUND CARE | Age: 57
Discharge: HOME | End: 2022-12-22
Payer: COMMERCIAL

## 2022-12-22 VITALS
DIASTOLIC BLOOD PRESSURE: 82 MMHG | HEART RATE: 76 BPM | RESPIRATION RATE: 18 BRPM | TEMPERATURE: 97.2 F | SYSTOLIC BLOOD PRESSURE: 128 MMHG

## 2022-12-22 PROCEDURE — 29581 APPL MULTLAYER CMPRN SYS LEG: CPT

## 2022-12-22 NOTE — DISCHARGE INSTRUCTIONS
[address] [place] [address] Physician Orders and Discharge Instructions  The [address] [address] [place] [address], [address] [facility]  Telephone: [phone]    NAME:  Andressa Colvin:  1965  MEDICAL RECORD NUMBER:  [id number]  DATE:  12/22/2022      Wound care:  Continue to follow the instructions and recommendations from your last doctor visit. The dressing(s) applied is the same as your last visit. Please refer to your last discharge instruction for the information on your wound care. If there were any changes made, please follow the instructions as written here: keep compression wrap to right lower leg dry and intact for the week. Future Appointments     Future Appointments   Date Time Provider Eloina Zamarripa   12/30/2022  9:15 AM David Hinojosa MD [facility] WOUND [facility] HOD   1/9/2023 10:30 AM Mike Dominguez [facility]           Your nurse  is:  Shima Adams     Electronically signed by Lorenzo Sharp RN on 12/22/2022 at 3:28 PM     [address] [place] [address] Information: Should you experience any significant changes in your wound(s) or have questions about your wound care, please contact the [address] [place] [address] at [phone]. Our hours vary so please leave a message. Please give us 24-48 hours to return your call. If you need help with your wounds and cannot wait until we are available, contact your PCP or go to the hospital emergency room. Physician orders by:  Dr. Xiang Ewing        The information contained in the After Visit Summary has been reviewed with me, the patient and/or responsible adult, by my health care provider(s). I had the opportunity to ask questions regarding this information. I have elected to receive;      [] Patient unable to sign Discharge Instructions.  Given to ECF/Transportation/POA

## 2022-12-22 NOTE — PROGRESS NOTES
Multilayer Compression Wrap   (Not Unna) Below the Knee    NAME:  Artur Santiago  YOB: 1965  MEDICAL RECORD NUMBER:  [id number]  DATE:  12/22/2022       Removed old Multilayer wrap if present and washed leg with mild soap/water. Applied moisturizing agent to dry skin as needed. Applied primary and secondary dressing as ordered    Applied multilayered dressing below the knee to Right lower leg(s)  (4 Layer Compression Wrap ) . Instructed patient/caregiver not to remove dressing and to keep it clean and dry. Instructed patient/caregiver on complications to report to provider, such as pain, numbness in toes, heavy drainage, and slippage of dressing. Instructed patient on purpose of compression dressing and on activity and exercise recommendations.    Applied per   Guidelines    Electronically signed by Janusz Felipe RN on 12/22/2022 at 3:26 PM

## 2022-12-29 ENCOUNTER — HOSPITAL ENCOUNTER (OUTPATIENT)
Dept: WOUND CARE | Age: 57
Discharge: HOME | End: 2022-12-29
Payer: COMMERCIAL

## 2022-12-29 VITALS
TEMPERATURE: 96.4 F | RESPIRATION RATE: 16 BRPM | DIASTOLIC BLOOD PRESSURE: 90 MMHG | HEART RATE: 76 BPM | SYSTOLIC BLOOD PRESSURE: 150 MMHG

## 2022-12-29 DIAGNOSIS — L97.919 IDIOPATHIC CHRONIC VENOUS HYPERTENSION OF RIGHT LOWER EXTREMITY WITH ULCER (HCC): ICD-10-CM

## 2022-12-29 DIAGNOSIS — I89.0 CHRONIC ACQUIRED LYMPHEDEMA: Primary | ICD-10-CM

## 2022-12-29 DIAGNOSIS — Z91.199 NONCOMPLIANCE: ICD-10-CM

## 2022-12-29 DIAGNOSIS — I87.311 IDIOPATHIC CHRONIC VENOUS HYPERTENSION OF RIGHT LOWER EXTREMITY WITH ULCER (HCC): ICD-10-CM

## 2022-12-29 PROCEDURE — 29581 APPL MULTLAYER CMPRN SYS LEG: CPT

## 2022-12-29 PROCEDURE — 15271 SKIN SUB GRAFT TRNK/ARM/LEG: CPT

## 2022-12-29 PROCEDURE — 11045 DBRDMT SUBQ TISS EACH ADDL: CPT

## 2022-12-29 PROCEDURE — 87186 SC STD MICRODIL/AGAR DIL: CPT

## 2022-12-29 PROCEDURE — 6370000000 HC RX 637 (ALT 250 FOR IP): Performed by: SPECIALIST

## 2022-12-29 PROCEDURE — 15276 SKIN SUB GRAFT F/N/HF/G ADDL: CPT

## 2022-12-29 PROCEDURE — 87077 CULTURE AEROBIC IDENTIFY: CPT

## 2022-12-29 PROCEDURE — 87070 CULTURE OTHR SPECIMN AEROBIC: CPT

## 2022-12-29 PROCEDURE — 87181 SC STD AGAR DILUTION PER AGT: CPT

## 2022-12-29 PROCEDURE — 11042 DBRDMT SUBQ TIS 1ST 20SQCM/<: CPT

## 2022-12-29 RX ORDER — LIDOCAINE 50 MG/G
OINTMENT TOPICAL ONCE
OUTPATIENT
Start: 2022-12-29 | End: 2022-12-29

## 2022-12-29 RX ORDER — LIDOCAINE HYDROCHLORIDE 40 MG/ML
SOLUTION TOPICAL ONCE
OUTPATIENT
Start: 2022-12-29 | End: 2022-12-29

## 2022-12-29 RX ORDER — CLOBETASOL PROPIONATE 0.5 MG/G
OINTMENT TOPICAL ONCE
OUTPATIENT
Start: 2022-12-29 | End: 2022-12-29

## 2022-12-29 RX ORDER — BACITRACIN, NEOMYCIN, POLYMYXIN B 400; 3.5; 5 [USP'U]/G; MG/G; [USP'U]/G
OINTMENT TOPICAL ONCE
OUTPATIENT
Start: 2022-12-29 | End: 2022-12-29

## 2022-12-29 RX ORDER — BETAMETHASONE DIPROPIONATE 0.05 %
OINTMENT (GRAM) TOPICAL ONCE
OUTPATIENT
Start: 2022-12-29 | End: 2022-12-29

## 2022-12-29 RX ORDER — LIDOCAINE HYDROCHLORIDE 20 MG/ML
JELLY TOPICAL ONCE
OUTPATIENT
Start: 2022-12-29 | End: 2022-12-29

## 2022-12-29 RX ORDER — GENTAMICIN SULFATE 1 MG/G
OINTMENT TOPICAL ONCE
OUTPATIENT
Start: 2022-12-29 | End: 2022-12-29

## 2022-12-29 RX ORDER — LIDOCAINE HYDROCHLORIDE 40 MG/ML
SOLUTION TOPICAL ONCE
Status: COMPLETED | OUTPATIENT
Start: 2022-12-29 | End: 2022-12-29

## 2022-12-29 RX ORDER — GINSENG 100 MG
CAPSULE ORAL ONCE
OUTPATIENT
Start: 2022-12-29 | End: 2022-12-29

## 2022-12-29 RX ORDER — BACITRACIN ZINC AND POLYMYXIN B SULFATE 500; 1000 [USP'U]/G; [USP'U]/G
OINTMENT TOPICAL ONCE
OUTPATIENT
Start: 2022-12-29 | End: 2022-12-29

## 2022-12-29 RX ORDER — LIDOCAINE 40 MG/G
CREAM TOPICAL ONCE
OUTPATIENT
Start: 2022-12-29 | End: 2022-12-29

## 2022-12-29 RX ADMIN — LIDOCAINE HYDROCHLORIDE: 40 SOLUTION TOPICAL at 08:50

## 2022-12-29 NOTE — DISCHARGE INSTRUCTIONS
[address] [facility] Physician Orders and Discharge Instructions  [address] [address] [address] [address] [facility]. [facility]. [place]  Telephone: [phone]  NAME:  Maki Messina  YOB: 1965  MEDICAL RECORD NUMBER:  [id number]  DATE: 12/29/22     Return Appointment:  Return Appointment: With Dr. Venkat Parson   on Tuesday , January 3rd,  [] Return Appointment for a Wound Assessment with the nurse on:     Future Appointments   Date Time Provider Eloina Zamarripa   1/9/2023 10:30 AM [address] [facility]: none  Medically necessary services: [] Skilled Nursing [] PT (Eval & Treat) [] OT (Eval & Treat) [] Social Work [] Dietician  [] Other:    Wound care instructions: If you smoke we ask that you refrain from smoking. Smoking inhibits wounds from healing. When taking antibiotics take the entire prescription as ordered. Do not stop taking until medication is all gone unless otherwise instructed. Exercise as tolerated. Keep weight off wounds and reposition every 2 hours if applicable. Do not get wounds wet in the bath or shower unless otherwise instructed by your physician. If your wound is on your foot or leg, you may purchase a cast bag. Please ask at the pharmacy. Wash hands with soap and water prior to and after every dressing change. [x]Wash wounds with: Vashe wash - Apply enough Vashe to soak a piece of gauze and place on wound bed for 5-10 minutes. No need to rinse after soaking.   [x]Kiah wound Topical Treatments: Do not apply lotions, creams, or ointments to the skin around the wound bed unless directed as followed:   Apply around the wound: Zinc paste           [x]Application of a biologic skin substitute: Yes: Theraskin : Wound Location: right lower leg wounds, covered with mepitel, steri strips, hydrogel applied,  This is a highly specialized wound care dressing that is intended to enhance your own bodies ability to increase growth factors and other healing abilities. Please leave the dressing clean, dry and intact unless otherwise instructed by your physician. Leave steri-strips and non adherent in place if changing the dressing as instructed. []Instructions for [facility] if applicable:         [x] Multilayer Compression Wrap:  Type: Applied on Right lower leg(s)  4 Layer Compression Wrap    Do not get leg(s) with wrap wet. If wraps become too tight call the center or completely remove the wrap. Elevate leg(s) above the level of the heart when sitting. Avoid prolonged standing in one place. Applied in Clinic on 12/29/2022  The Goal of this therapy is to reduce edema and get into long term compression garments to control venous insufficiency, lymphedema and reduce occurrence of venous ulcers    [x] Edema Control:  Apply: Compression Stocking on the Left leg     Elevate leg(s) above the level of the heart for 30 minutes 4-5 times a day and/or when sitting. Avoid prolonged standing in one place. [x] Lymphedema Therapy: DO NOT USE PUMPS THIS WEEK. Wear Lymphedema pumps twice a day at settings prescribed by your physician. Avoid prolonged standing in one place. Dietary:  Important dietary reminders:  1. Increase Protein intake (i.e. Lean meats, fish, eggs, legumes, and yogurt)  2. No added salt  3. If diabetic, follow a diabetic diet and check glucose prior to meals or as instructed by your physician. Dietary Supplements(Take twice a day unless instructed otherwise):  [] Brenden Medina  [] 30ml ProStat [] Bhumika Bidding [] Ensure Max/Premier [] Other:    Your nurse  is:  Jacquelin Rose     Electronically signed by Marlen Swanson RN on 12/29/2022 at [address]: Should you experience any significant changes in your wound(s) or have questions about your wound care, please contact the [address] [place] [address] at [phone].  We are open from 8:00am - 4:30p Monday, Thursday and Friday; 11:00am - 5pm on Tuesday and CLOSED Wednesday. Please give us 24-48 business hours to return your call. Call your doctor now or seek immediate medical care if:    You have symptoms of infection, such as: Increased pain, swelling, warmth, or redness. Red streaks leading from the area. Pus draining from the area. A fever.          [] Patient unable to sign Discharge Instructions given to ECF/Transportation/POA

## 2022-12-29 NOTE — PROGRESS NOTES
TheraSkin Treatment Note      Goal:  Patient will receive safe and proper application of skin substitute. Patient will comply with caring for dressing, offloading and reporting complications. [x] Expiration date of TheraSkin checked immediately prior to use. [x] Package intact prior to use and no damage noted. [x] Transport temperature controlled and acceptable. TheraSkin was prepared for application by removing from package. TheraSkin was rinsed 2 times in room temperature normal saline for 5 seconds each time. A 2nd saline rinse was left on the TheraSkin until the provider was ready to apply it within 120 minutes of thawing. White side placed against ulcer bed. [x] Theraskin was applied to wound # 1, 2, and 3 on right lower leg and affixed with steri-strips by the provider. [x] Secondary dressing applied as ordered. [x] Patient/caregiver was instructed not to remove dressing and to keep it clean and dry. See AVS for further instructions given to patient/caregiver. Theraskin may be applied a total of 10 times per wound over a 12 week period. Additionally Theraskin may only be used every 12 months per wound. Date of first application of Theraskin for this current wound is November 14, 2022.      Application # 3           Guidelines followed      Electronically signed by Daylin Duran RN on 12/29/2022 at 12:05 PM

## 2022-12-29 NOTE — PROGRESS NOTES
1227 [facility] [place]  Progress Note and Procedure Note      Darren Simon  MEDICAL RECORD NUMBER:  [id number]  AGE: 62 y.o. GENDER: male  : 1965  EPISODE DATE:  2022    Subjective:     Chief Complaint   Patient presents with    Wound Check     Right lower leg           HISTORY of PRESENT ILLNESS HPI     Darren Simon is a 62 y.o. male who presents today for wound/ulcer evaluation. History of Wound Context: Patient continues follow-up for chronic venous insufficiency with ulceration and significant lymphedema. He is now 3 weeks post TheraSkin allograft application to right lower extremity ulcers. He describes no pain or drainage. He remains on doxycycline. Persistently remains on his feet for extended periods of time associated with his work    Wound has failed to heal greater than 30%-50% at initial assessment. Patient has received greater than 30 days of conservative treatment including debridements at each wound care visit and the following: Compression    Pain Assessment:  Wound/Ulcer Pain Timing/Severity: none  Quality of pain: N/A  Severity:  0 / 10   Modifying Factors: None  Associated Signs/Symptoms: edema    Ulcer Identification:  Ulcer Type: venous  Contributing Factors: edema, venous stasis, lymphedema, obesity, and anticoagulation therapy    Last A5N if applicable: No results found for: LABA1C    Objective:      BP (!) 150/90   Pulse 76   Temp (!) 96.4 °F (35.8 °C) (Temporal)   Resp 16     Wt Readings from Last 3 Encounters:   22 251 lb 6.4 oz (114 kg)   22 251 lb 6.4 oz (114 kg)   10/27/21 244 lb (110.7 kg)       PHYSICAL EXAM    General Appearance: alert and oriented to person, place and time and obese  Extremities: no cyanosis, no clubbing, 3+ edema-  right lower extremity, and 3 full-thickness granulating ulcers with minimal fibrin, epithelialization at the margins of the ulcers donated as well ulcers #1 #2 #3.   Minimal periwound Odor Mild 12/29/22 0845   Kiah-wound Assessment Erosion 12/29/22 0845   Margins Defined edges 12/29/22 0845   Wound Thickness Description not for Pressure Injury Full thickness 12/29/22 0845   Number of days: 157       Wound 07/25/22 Leg Right; Anterior; Lower #2 (Active)   Wound Image   12/05/22 1151   Wound Etiology Venous 07/25/22 1040   Wound Cleansed Cleansed with saline 12/29/22 0845   Dressing/Treatment Other (comment) 12/29/22 0905   Wound Length (cm) 1.3 cm 12/29/22 0845   Wound Width (cm) 4.9 cm 12/29/22 0845   Wound Depth (cm) 0.1 cm 12/29/22 0845   Wound Surface Area (cm^2) 6.37 cm^2 12/29/22 0845   Change in Wound Size % (l*w) 86.03 12/29/22 0845   Wound Volume (cm^3) 0.637 cm^3 12/29/22 0845   Wound Healing % 86 12/29/22 0845   Post-Procedure Length (cm) 1.4 cm 12/29/22 0905   Post-Procedure Width (cm) 5 cm 12/29/22 0905   Post-Procedure Depth (cm) 0.3 cm 12/29/22 0905   Post-Procedure Surface Area (cm^2) 7 cm^2 12/29/22 0905   Post-Procedure Volume (cm^3) 2.1 cm^3 12/29/22 0905   Distance Tunneling (cm) 0 cm 12/29/22 0845   Tunneling Position ___ O'Clock 0 12/22/22 1520   Undermining Starts ___ O'Clock 0 12/22/22 1520   Undermining Ends___ O'Clock 0 12/22/22 1520   Undermining Maxium Distance (cm) 0 12/29/22 0845   Wound Assessment Fibrin;Pink/red 12/29/22 0845   Drainage Amount Moderate 12/29/22 0845   Drainage Description Serosanguinous; Yellow 12/29/22 0845   Odor Mild 12/29/22 0845   Kiah-wound Assessment Intact; Maceration 12/29/22 0845   Margins Defined edges 12/29/22 0845   Wound Thickness Description not for Pressure Injury Full thickness 12/29/22 0845   Number of days: 157       Wound 07/25/22 Leg Right;Posterior; Lower #3 (Active)   Wound Image   12/05/22 1151   Wound Etiology Venous 07/25/22 1040   Wound Cleansed Cleansed with saline 12/29/22 0845   Dressing/Treatment Other (comment) 12/29/22 0905   Wound Length (cm) 2.2 cm 12/29/22 0845   Wound Width (cm) 3 cm 12/29/22 0845   Wound Depth (cm) 0.1 cm 12/29/22 0845   Wound Surface Area (cm^2) 6.6 cm^2 12/29/22 0845   Change in Wound Size % (l*w) 85 12/29/22 0845   Wound Volume (cm^3) 0.66 cm^3 12/29/22 0845   Wound Healing % 85 12/29/22 0845   Post-Procedure Length (cm) 2.3 cm 12/29/22 0905   Post-Procedure Width (cm) 3.1 cm 12/29/22 0905   Post-Procedure Depth (cm) 0.3 cm 12/29/22 0905   Post-Procedure Surface Area (cm^2) 7.13 cm^2 12/29/22 0905   Post-Procedure Volume (cm^3) 2.139 cm^3 12/29/22 0905   Distance Tunneling (cm) 0 cm 12/29/22 0845   Tunneling Position ___ O'Clock 0 12/22/22 1520   Undermining Starts ___ O'Clock 0 12/22/22 1520   Undermining Ends___ O'Clock 0 12/22/22 1520   Undermining Maxium Distance (cm) 0 12/29/22 0845   Wound Assessment Fibrin;Pink/red 12/29/22 0845   Drainage Amount Moderate 12/29/22 0845   Drainage Description Brown 12/29/22 0845   Odor Mild 12/29/22 0845   Kiah-wound Assessment Intact; Maceration 12/29/22 0845   Margins Undefined edges 12/29/22 0845   Wound Thickness Description not for Pressure Injury Full thickness 12/29/22 0845   Number of days: 157          Percent of Wound Debrided: 100%    Total Surface Area Debrided:  20.6 sq cm    Diabetic/Pressure/Non Pressure Ulcers only:  Ulcer: Non-Pressure ulcer, fat layer exposed    Bleeding: Minimal    Hemostasis Achieved: by pressure    Procedural Pain: 0  / 10     Post Procedural Pain: 0 / 10     Response to treatment:  Well tolerated by patient. rocedure:  Skin Substitute Application    Performed by: Lori Villareal MD    Ulcer Type: venous    Consent obtained: Yes    Time out taken: Yes    Product Utilized: Other TheraSkin 26 cm     Fenestrated: No    Mesher Utilized: No    Instrument(s) curette    Skin Substitute was Applied to Ulcer Number(s):    Ulcer #: 1, 2, and 3    Wound 07/25/22 Leg Right; Lower;Medial #1 (Active)   Wound Image   12/05/22 1151   Wound Etiology Venous 07/25/22 1040   Wound Cleansed Cleansed with saline 12/29/22 0845   Dressing/Treatment Other (comment) 12/29/22 0905   Wound Length (cm) 2 cm 12/29/22 0845   Wound Width (cm) 3 cm 12/29/22 0845   Wound Depth (cm) 0.1 cm 12/29/22 0845   Wound Surface Area (cm^2) 6 cm^2 12/29/22 0845   Change in Wound Size % (l*w) 74.63 12/29/22 0845   Wound Volume (cm^3) 0.6 cm^3 12/29/22 0845   Wound Healing % 75 12/29/22 0845   Post-Procedure Length (cm) 2.1 cm 12/29/22 0905   Post-Procedure Width (cm) 3.1 cm 12/29/22 0905   Post-Procedure Depth (cm) 0.3 cm 12/29/22 0905   Post-Procedure Surface Area (cm^2) 6.51 cm^2 12/29/22 0905   Post-Procedure Volume (cm^3) 1. 953 cm^3 12/29/22 0905   Distance Tunneling (cm) 0 cm 12/29/22 0845   Tunneling Position ___ O'Clock 0 12/22/22 1520   Undermining Starts ___ O'Clock 0 12/22/22 1520   Undermining Ends___ O'Clock 0 12/22/22 1520   Undermining Maxium Distance (cm) 0 12/29/22 0845   Wound Assessment Pink/red;Fibrin 12/29/22 0845   Drainage Amount Moderate 12/29/22 0845   Drainage Description Yellow;Serosanguinous 12/29/22 0845   Odor Mild 12/29/22 0845   Kiah-wound Assessment Erosion 12/29/22 0845   Margins Defined edges 12/29/22 0845   Wound Thickness Description not for Pressure Injury Full thickness 12/29/22 0845   Number of days: 157       Wound 07/25/22 Leg Right; Anterior; Lower #2 (Active)   Wound Image   12/05/22 1151   Wound Etiology Venous 07/25/22 1040   Wound Cleansed Cleansed with saline 12/29/22 0845   Dressing/Treatment Other (comment) 12/29/22 0905   Wound Length (cm) 1.3 cm 12/29/22 0845   Wound Width (cm) 4.9 cm 12/29/22 0845   Wound Depth (cm) 0.1 cm 12/29/22 0845   Wound Surface Area (cm^2) 6.37 cm^2 12/29/22 0845   Change in Wound Size % (l*w) 86.03 12/29/22 0845   Wound Volume (cm^3) 0.637 cm^3 12/29/22 0845   Wound Healing % 86 12/29/22 0845   Post-Procedure Length (cm) 1.4 cm 12/29/22 0905   Post-Procedure Width (cm) 5 cm 12/29/22 0905   Post-Procedure Depth (cm) 0.3 cm 12/29/22 0905   Post-Procedure Surface Area (cm^2) 7 cm^2 12/29/22 0905 Post-Procedure Volume (cm^3) 2.1 cm^3 12/29/22 0905   Distance Tunneling (cm) 0 cm 12/29/22 0845   Tunneling Position ___ O'Clock 0 12/22/22 1520   Undermining Starts ___ O'Clock 0 12/22/22 1520   Undermining Ends___ O'Clock 0 12/22/22 1520   Undermining Maxium Distance (cm) 0 12/29/22 0845   Wound Assessment Fibrin;Pink/red 12/29/22 0845   Drainage Amount Moderate 12/29/22 0845   Drainage Description Serosanguinous; Yellow 12/29/22 0845   Odor Mild 12/29/22 0845   Kiah-wound Assessment Intact; Maceration 12/29/22 0845   Margins Defined edges 12/29/22 0845   Wound Thickness Description not for Pressure Injury Full thickness 12/29/22 0845   Number of days: 157       Wound 07/25/22 Leg Right;Posterior; Lower #3 (Active)   Wound Image   12/05/22 1151   Wound Etiology Venous 07/25/22 1040   Wound Cleansed Cleansed with saline 12/29/22 0845   Dressing/Treatment Other (comment) 12/29/22 0905   Wound Length (cm) 2.2 cm 12/29/22 0845   Wound Width (cm) 3 cm 12/29/22 0845   Wound Depth (cm) 0.1 cm 12/29/22 0845   Wound Surface Area (cm^2) 6.6 cm^2 12/29/22 0845   Change in Wound Size % (l*w) 85 12/29/22 0845   Wound Volume (cm^3) 0.66 cm^3 12/29/22 0845   Wound Healing % 85 12/29/22 0845   Post-Procedure Length (cm) 2.3 cm 12/29/22 0905   Post-Procedure Width (cm) 3.1 cm 12/29/22 0905   Post-Procedure Depth (cm) 0.3 cm 12/29/22 0905   Post-Procedure Surface Area (cm^2) 7.13 cm^2 12/29/22 0905   Post-Procedure Volume (cm^3) 2.139 cm^3 12/29/22 0905   Distance Tunneling (cm) 0 cm 12/29/22 0845   Tunneling Position ___ O'Clock 0 12/22/22 1520   Undermining Starts ___ O'Clock 0 12/22/22 1520   Undermining Ends___ O'Clock 0 12/22/22 1520   Undermining Maxium Distance (cm) 0 12/29/22 0845   Wound Assessment Fibrin;Pink/red 12/29/22 0845   Drainage Amount Moderate 12/29/22 0845   Drainage Description Brown 12/29/22 0845   Odor Mild 12/29/22 0845   Kiah-wound Assessment Intact; Maceration 12/29/22 0845   Margins Undefined edges 12/29/22 0845   Wound Thickness Description not for Pressure Injury Full thickness 12/29/22 0845   Number of days: 157          Diabetic/Pressure/Non Pressure Ulcers:  Ulcer:   Non-Pressure ulcer, fat layer exposed      Total Surface Area of Ulcer(s) Covered 20.6 sq/cm    Was the Product Layered  No     Amount of Product Applied 20.6 sq/cm     Amount of Product Wasted 5.4 sq/cm     Reason for Waste: The size of the wound is smaller than the product utilized     Surgically Fixated: No:     Secured With: Steri Strips and Silicone Dressing     Procedural Pain: 0/10     Post Procedural Pain: 0 / 10    Response to Treatment: Well tolerated by patient. Continued slow epithelialization of venous ulcers. A third application of TheraSkin allograft was applied as deemed medically necessary to assist with healing      Plan:     Treatment Note: Please see attached Discharge Instructions. These instructions were given and signed by the patient or POA    New Medication(s) at this visit:   New Prescriptions    No medications on file       Discharge Instructions          [facility] Physician Orders and Discharge Instructions  [address]. [address] [place] [address]. [place]  Telephone: [phone]  NAME:  Adin Gregory  YOB: 1965  MEDICAL RECORD NUMBER:  [id number]  DATE: 12/29/22     Return Appointment:  Return Appointment: With Dr. Yandel Miles   on Tuesday , January 3rd,  [] Return Appointment for a Wound Assessment with the nurse on:     Future Appointments   Date Time Provider Eloina Zamarripa   1/9/2023 10:30 AM [address] [place]: none  Medically necessary services: [] Skilled Nursing [] PT (Eval & Treat) [] OT (Eval & Treat) [] Social Work [] Dietician  [] Other:    Wound care instructions: If you smoke we ask that you refrain from smoking. Smoking inhibits wounds from healing.   When taking antibiotics take the entire prescription as ordered. Do not stop taking until medication is all gone unless otherwise instructed. Exercise as tolerated. Keep weight off wounds and reposition every 2 hours if applicable. Do not get wounds wet in the bath or shower unless otherwise instructed by your physician. If your wound is on your foot or leg, you may purchase a cast bag. Please ask at the pharmacy. Wash hands with soap and water prior to and after every dressing change. [x]Wash wounds with: Vashe wash - Apply enough Vashe to soak a piece of gauze and place on wound bed for 5-10 minutes. No need to rinse after soaking. [x]Kiah wound Topical Treatments: Do not apply lotions, creams, or ointments to the skin around the wound bed unless directed as followed:   Apply around the wound: Zinc paste           [x]Application of a biologic skin substitute: Yes: Rochelle : Wound Location: right lower leg wounds, covered with mepitel, steri strips, hydrogel applied,  This is a highly specialized wound care dressing that is intended to enhance your own bodies ability to increase growth factors and other healing abilities. Please leave the dressing clean, dry and intact unless otherwise instructed by your physician. Leave steri-strips and non adherent in place if changing the dressing as instructed. []Instructions for [employer] [facility] if applicable:         [x] Multilayer Compression Wrap:  Type: Applied on Right lower leg(s)  4 Layer Compression Wrap    Do not get leg(s) with wrap wet. If wraps become too tight call the center or completely remove the wrap. Elevate leg(s) above the level of the heart when sitting. Avoid prolonged standing in one place.    Applied in Clinic on 12/29/2022  The Goal of this therapy is to reduce edema and get into long term compression garments to control venous insufficiency, lymphedema and reduce occurrence of venous ulcers    [x] Edema Control:  Apply: Compression Stocking on the Left leg     Elevate leg(s) above the level of the heart for 30 minutes 4-5 times a day and/or when sitting. Avoid prolonged standing in one place. [x] Lymphedema Therapy: DO NOT USE PUMPS THIS WEEK. Wear Lymphedema pumps twice a day at settings prescribed by your physician. Avoid prolonged standing in one place. Dietary:  Important dietary reminders:  1. Increase Protein intake (i.e. Lean meats, fish, eggs, legumes, and yogurt)  2. No added salt  3. If diabetic, follow a diabetic diet and check glucose prior to meals or as instructed by your physician. Dietary Supplements(Take twice a day unless instructed otherwise):  [] [place]  [] 30ml ProStat [] Nancye Poonam [] Ensure Max/Premier [] Other:    Your nurse  is:  Jacquelin Rose     Electronically signed by Mc Le RN on 12/29/2022 at [address]: Should you experience any significant changes in your wound(s) or have questions about your wound care, please contact the [address] [place] [address] at [phone]. We are open from 8:00am - 4:30p Monday, Thursday and Friday; 11:00am - 5pm on Tuesday and CLOSED Wednesday. Please give us 24-48 business hours to return your call. Call your doctor now or seek immediate medical care if:    You have symptoms of infection, such as: Increased pain, swelling, warmth, or redness. Red streaks leading from the area. Pus draining from the area. A fever.          [] Patient unable to sign Discharge Instructions given to ECF/Transportation/POA         Electronically signed by Jackelyn Dsouza MD on 12/29/2022 at 11:51 AM

## 2022-12-29 NOTE — PROGRESS NOTES
Multilayer Compression Wrap   (Not Unna) Below the Knee    NAME:  Kenji Aparicio  YOB: 1965  MEDICAL RECORD NUMBER:  [id number]  DATE:  12/29/2022       Removed old Multilayer wrap if present and washed leg with mild soap/water. Applied moisturizing agent to dry skin as needed. Applied primary and secondary dressing as ordered    Applied multilayered dressing below the knee to Right lower leg(s)  (4 Layer Compression Wrap ) . Instructed patient/caregiver not to remove dressing and to keep it clean and dry. Instructed patient/caregiver on complications to report to provider, such as pain, numbness in toes, heavy drainage, and slippage of dressing. Instructed patient on purpose of compression dressing and on activity and exercise recommendations.    Applied per   Guidelines    Electronically signed by Nicolas Chowdary RN on 12/29/2022 at 9:35 AM

## 2023-01-01 LAB
ANAEROBIC CULTURE: ABNORMAL
GRAM STAIN RESULT: ABNORMAL
ORGANISM: ABNORMAL
ORGANISM: ABNORMAL
WOUND/ABSCESS: ABNORMAL
WOUND/ABSCESS: ABNORMAL

## 2023-01-03 ENCOUNTER — HOSPITAL ENCOUNTER (OUTPATIENT)
Dept: WOUND CARE | Age: 58
Discharge: HOME | End: 2023-01-03
Payer: COMMERCIAL

## 2023-01-03 VITALS
TEMPERATURE: 97.3 F | HEART RATE: 72 BPM | DIASTOLIC BLOOD PRESSURE: 84 MMHG | SYSTOLIC BLOOD PRESSURE: 151 MMHG | RESPIRATION RATE: 16 BRPM

## 2023-01-03 DIAGNOSIS — I87.311 IDIOPATHIC CHRONIC VENOUS HYPERTENSION OF RIGHT LOWER EXTREMITY WITH ULCER (HCC): ICD-10-CM

## 2023-01-03 DIAGNOSIS — L97.919 IDIOPATHIC CHRONIC VENOUS HYPERTENSION OF RIGHT LOWER EXTREMITY WITH ULCER (HCC): ICD-10-CM

## 2023-01-03 DIAGNOSIS — I89.0 CHRONIC ACQUIRED LYMPHEDEMA: Primary | ICD-10-CM

## 2023-01-03 DIAGNOSIS — Z91.199 NONCOMPLIANCE: ICD-10-CM

## 2023-01-03 PROCEDURE — 29581 APPL MULTLAYER CMPRN SYS LEG: CPT

## 2023-01-03 PROCEDURE — 6370000000 HC RX 637 (ALT 250 FOR IP): Performed by: SPECIALIST

## 2023-01-03 PROCEDURE — 99212 OFFICE O/P EST SF 10 MIN: CPT | Performed by: SPECIALIST

## 2023-01-03 RX ORDER — DOXYCYCLINE HYCLATE 100 MG
100 TABLET ORAL 2 TIMES DAILY
Qty: 60 TABLET | Refills: 0 | Status: SHIPPED | OUTPATIENT
Start: 2023-01-03 | End: 2023-02-02

## 2023-01-03 RX ORDER — LIDOCAINE 40 MG/G
CREAM TOPICAL ONCE
OUTPATIENT
Start: 2023-01-03 | End: 2023-01-03

## 2023-01-03 RX ORDER — LIDOCAINE HYDROCHLORIDE 40 MG/ML
SOLUTION TOPICAL ONCE
OUTPATIENT
Start: 2023-01-03 | End: 2023-01-03

## 2023-01-03 RX ORDER — GENTAMICIN SULFATE 1 MG/G
OINTMENT TOPICAL ONCE
OUTPATIENT
Start: 2023-01-03 | End: 2023-01-03

## 2023-01-03 RX ORDER — BACITRACIN, NEOMYCIN, POLYMYXIN B 400; 3.5; 5 [USP'U]/G; MG/G; [USP'U]/G
OINTMENT TOPICAL ONCE
OUTPATIENT
Start: 2023-01-03 | End: 2023-01-03

## 2023-01-03 RX ORDER — BETAMETHASONE DIPROPIONATE 0.05 %
OINTMENT (GRAM) TOPICAL ONCE
OUTPATIENT
Start: 2023-01-03 | End: 2023-01-03

## 2023-01-03 RX ORDER — LIDOCAINE HYDROCHLORIDE 20 MG/ML
JELLY TOPICAL ONCE
OUTPATIENT
Start: 2023-01-03 | End: 2023-01-03

## 2023-01-03 RX ORDER — CLOBETASOL PROPIONATE 0.5 MG/G
OINTMENT TOPICAL ONCE
OUTPATIENT
Start: 2023-01-03 | End: 2023-01-03

## 2023-01-03 RX ORDER — GENTAMICIN SULFATE 1 MG/G
OINTMENT TOPICAL ONCE
Status: COMPLETED | OUTPATIENT
Start: 2023-01-03 | End: 2023-01-03

## 2023-01-03 RX ORDER — BACITRACIN ZINC AND POLYMYXIN B SULFATE 500; 1000 [USP'U]/G; [USP'U]/G
OINTMENT TOPICAL ONCE
OUTPATIENT
Start: 2023-01-03 | End: 2023-01-03

## 2023-01-03 RX ORDER — LIDOCAINE 50 MG/G
OINTMENT TOPICAL ONCE
OUTPATIENT
Start: 2023-01-03 | End: 2023-01-03

## 2023-01-03 RX ORDER — GINSENG 100 MG
CAPSULE ORAL ONCE
OUTPATIENT
Start: 2023-01-03 | End: 2023-01-03

## 2023-01-03 RX ADMIN — GENTAMICIN SULFATE: 1 OINTMENT TOPICAL at 11:24

## 2023-01-03 NOTE — DISCHARGE INSTRUCTIONS
215 [facility] Physician Orders and Discharge Instructions  [address] [place] [address] [address] [facility]. [facility] [[email or web address]] [address]  Telephone: [phone]  NAME:  Leela Hills  YOB: 1965  MEDICAL RECORD NUMBER:  [id number]  DATE: 1/3/23     Return Appointment:  Return Appointment: With Dr. Teagan Duran  in  1 [facility])  [] Return Appointment for a Wound Assessment with the nurse on:     Future Appointments   Date Time Provider Eloina Zamarripa   1/9/2023 10:30 AM [address] [place]: none  Medically necessary services: [] Skilled Nursing [] PT (Eval & Treat) [] OT (Eval & Treat) [] Social Work [] Dietician  [] Other:    Wound care instructions: If you smoke we ask that you refrain from smoking. Smoking inhibits wounds from healing. When taking antibiotics take the entire prescription as ordered. Do not stop taking until medication is all gone unless otherwise instructed. Exercise as tolerated. Keep weight off wounds and reposition every 2 hours if applicable. Do not get wounds wet in the bath or shower unless otherwise instructed by your physician. If your wound is on your foot or leg, you may purchase a cast bag. Please ask at the pharmacy. Wash hands with soap and water prior to and after every dressing change. [x]Wash wounds with: Vashe wash - Apply enough Vashe to soak a piece of gauze and place on wound bed for 5-10 minutes. No need to rinse after soaking. [x]Kiah wound Topical Treatments: Do not apply lotions, creams, or ointments to the skin around the wound bed unless directed as followed:   Apply around the wound: Zinc paste         [x]Wound Location: right lower leg wounds   Apply Primary Dressing to wound: Pharmaceutical medication: gentamicin ointment  Secondary Dressing: 4X4 gauze pad   Avoid contact of tape with skin if possible.   When to change Dressing: DO NOT CHANGE    [x] Multilayer Compression Wrap:  Type: Applied on Right lower leg(s)  4 Layer Compression Wrap    Do not get leg(s) with wrap wet. If wraps become too tight call the center or completely remove the wrap. Elevate leg(s) above the level of the heart when sitting. Avoid prolonged standing in one place. Applied in Clinic on 1/3/2023  The Goal of this therapy is to reduce edema and get into long term compression garments to control venous insufficiency, lymphedema and reduce occurrence of venous ulcers    [x] Edema Control:  Apply: Compression Stocking on the Left leg  Apply every morning immediately when getting up. Remove every night before going to bed unless instructed otherwise     Elevate leg(s) above the level of the heart for 30 minutes 4-5 times a day and/or when sitting. Avoid prolonged standing in one place. [x] Lymphedema Therapy:  Wear Lymphedema pumps twice a day at settings prescribed by your physician. Avoid prolonged standing in one place. Dietary:  Important dietary reminders:  1. Increase Protein intake (i.e. Lean meats, fish, eggs, legumes, and yogurt)  2. No added salt  3. If diabetic, follow a diabetic diet and check glucose prior to meals or as instructed by your physician. Dietary Supplements(Take twice a day unless instructed otherwise):  [] Liliana Bump  [] 30ml ProStat [] Williams Huge [] Ensure Max/Premier [] Other:    Your nurse  is:  Mauricio Loyd     Electronically signed by Mady Garcia RN on 1/3/2023 at [id number] Information: Should you experience any significant changes in your wound(s) or have questions about your wound care, please contact the [address] [place] [address] at [phone]. We are open from 8:00am - 4:30p Monday, Thursday and Friday; 11:00am - 5pm on Tuesday and CLOSED Wednesday. Please give us 24-48 business hours to return your call.       Call your doctor now or seek immediate medical care if:    You have symptoms of infection, such as:  Increased pain, swelling, warmth, or redness. Red streaks leading from the area. Pus draining from the area. A fever.          [] Patient unable to sign Discharge Instructions given to ECF/Transportation/POA

## 2023-01-03 NOTE — PROGRESS NOTES
Multilayer Compression Wrap   (Not Unna) Below the Knee    NAME:  Jay   YOB: 1965  MEDICAL RECORD NUMBER:  [id number]  DATE:  1/3/2023       Removed old Multilayer wrap if present and washed leg with mild soap/water. Applied moisturizing agent to dry skin as needed. Applied primary and secondary dressing as ordered    Applied multilayered dressing below the knee to Right lower leg(s)  (4 Layer Compression Wrap ) . Instructed patient/caregiver not to remove dressing and to keep it clean and dry. Instructed patient/caregiver on complications to report to provider, such as pain, numbness in toes, heavy drainage, and slippage of dressing. Instructed patient on purpose of compression dressing and on activity and exercise recommendations.    Applied per   Guidelines    Electronically signed by Indy Young RN on 1/3/2023 at 11:27 AM

## 2023-01-03 NOTE — PROGRESS NOTES
[address] [facility] [place]  Progress Note and Procedure Note      Justin Baeza  AGE: 62 y.o. GENDER: male  : 1965  EPISODE DATE:  1/3/2023      Subjective:     Chief Complaint   Patient presents with    Wound Check     Right lower leg         HISTORY of PRESENT ILLNESS HPI     JoseA lejandro [place] is a 62 y.o. male who presents today for wound evaluation. History of Wound Context: Patient continues follow-up for chronic venous insufficiency with ulceration and significant lymphedema. He is now 1 weeks post third TheraSkin allograft application to right lower extremity ulcers. He describes no pain or drainage. He remains on doxycycline. Persistently remains on his feet for extended periods of time associated with his work.   Not utilizing lymphedema pumps as he should  Wound Pain Timing/Severity: none  Quality of pain: N/A  Severity:  0 / 10   Modifying Factors: None  Associated Signs/Symptoms: edema    Wound Identification:  Wound Type: venous  Contributing Factors: edema, venous stasis, lymphedema, obesity, and anticoagulation therapy        PAST MEDICAL HISTORY        Diagnosis Date    DVT (deep venous thrombosis) (HCC)     Hx of blood clots     Pain and swelling of right lower leg        PAST SURGICAL HISTORY    Past Surgical History:   Procedure Laterality Date    [place]GIOP[place] [facility] [place]      SKIN SPLIT GRAFT Right        FAMILY HISTORY    Family History   Problem Relation Age of Onset    Diabetes Mother     Heart Attack Father        SOCIAL HISTORY    Social History     Tobacco Use    Smoking status: Never    Smokeless tobacco: Never   Vaping Use    Vaping Use: Never used   Substance Use Topics    Alcohol use: No    Drug use: No       ALLERGIES    No Known Allergies    MEDICATIONS    Current Outpatient Medications on File Prior to Encounter   Medication Sig Dispense Refill    doxycycline hyclate (VIBRA-TABS) 100 MG tablet Take 1 tablet by mouth 2 times daily 60 tablet 0    ibuprofen (ADVIL;MOTRIN) 200 MG tablet Take 200 mg by mouth every 6 hours as needed for Pain      triamcinolone (KENALOG) 0.1 % ointment Apply topically 2 times daily (Patient not taking: No sig reported) 453.6 g 1    warfarin (COUMADIN) 5 MG tablet TAKE ONE AND ONE-HALF TABLETS BY MOUTH DAILY EXCEPT TAKE 2 TABLETS DAILY ON FRIDAY 180 tablet 1    Compression Stockings MISC by Does not apply route Strength 30-40mmHg  Style: Other pull up compression stockings  Extremity: bilateral  Donning aid recommended: No 1 each 0     No current facility-administered medications on file prior to encounter. REVIEW OF SYSTEMS    Pertinent items are noted in HPI. Objective:      BP (!) 151/84   Pulse 72   Temp 97.3 °F (36.3 °C) (Temporal)   Resp 16     PHYSICAL EXAM    General Appearance: alert and oriented to person, place and time, well-developed and well-nourished, in no acute distress  Extremities: no cyanosis, no clubbing, 3+ edema-  right lower extremity, and 3 full-thickness ulcers present with presence of TheraSkin allograft periwound maceration. Assessment:     1. Chronic acquired lymphedema    2. Idiopathic chronic venous hypertension of right lower extremity with ulcer ([facility] [address]    3. Noncompliance          Wound Measurements:  Wound 07/25/22 Leg Right; Lower;Medial #1 (Active)   Wound Image   01/03/23 1118   Wound Etiology Venous 07/25/22 1040   Wound Cleansed Cleansed with saline 01/03/23 1042   Dressing/Treatment Pharmaceutical agent (see MAR) 01/03/23 1118   Wound Length (cm) 2 cm 01/03/23 1042   Wound Width (cm) 3.5 cm 01/03/23 1042   Wound Depth (cm) 0.1 cm 01/03/23 1042   Wound Surface Area (cm^2) 7 cm^2 01/03/23 1042   Change in Wound Size % (l*w) 70.4 01/03/23 1042   Wound Volume (cm^3) 0.7 cm^3 01/03/23 1042   Wound Healing % 70 01/03/23 1042   Post-Procedure Length (cm) 2 cm 01/03/23 1118   Post-Procedure Width (cm) 3.5 cm 01/03/23 1118 Post-Procedure Depth (cm) 0.1 cm 01/03/23 1118   Post-Procedure Surface Area (cm^2) 7 cm^2 01/03/23 1118   Post-Procedure Volume (cm^3) 0.7 cm^3 01/03/23 1118   Distance Tunneling (cm) 0 cm 12/29/22 0845   Tunneling Position ___ O'Clock 0 12/22/22 1520   Undermining Starts ___ O'Clock 0 12/22/22 1520   Undermining Ends___ O'Clock 0 12/22/22 1520   Undermining Maxium Distance (cm) 0 12/29/22 0845   Wound Assessment Graft 01/03/23 1042   Drainage Amount Moderate 01/03/23 1042   Drainage Description Yellow;Serosanguinous 01/03/23 1042   Odor Mild 01/03/23 1042   Kiah-wound Assessment Erosion 01/03/23 1042   Margins Defined edges 01/03/23 1042   Wound Thickness Description not for Pressure Injury Full thickness 01/03/23 1042   Number of days: 162       Wound 07/25/22 Leg Right; Anterior; Lower #2 (Active)   Wound Image   01/03/23 1118   Wound Etiology Venous 07/25/22 1040   Wound Cleansed Cleansed with saline 01/03/23 1042   Dressing/Treatment Pharmaceutical agent (see MAR) 01/03/23 1118   Wound Length (cm) 1.7 cm 01/03/23 1042   Wound Width (cm) 4 cm 01/03/23 1042   Wound Depth (cm) 0.1 cm 01/03/23 1042   Wound Surface Area (cm^2) 6.8 cm^2 01/03/23 1042   Change in Wound Size % (l*w) 85.09 01/03/23 1042   Wound Volume (cm^3) 0.68 cm^3 01/03/23 1042   Wound Healing % 85 01/03/23 1042   Post-Procedure Length (cm) 1.7 cm 01/03/23 1118   Post-Procedure Width (cm) 4 cm 01/03/23 1118   Post-Procedure Depth (cm) 0.1 cm 01/03/23 1118   Post-Procedure Surface Area (cm^2) 6.8 cm^2 01/03/23 1118   Post-Procedure Volume (cm^3) 0.68 cm^3 01/03/23 1118   Distance Tunneling (cm) 0 cm 12/29/22 0845   Tunneling Position ___ O'Clock 0 12/22/22 1520   Undermining Starts ___ O'Clock 0 12/22/22 1520   Undermining Ends___ O'Clock 0 12/22/22 1520   Undermining Maxium Distance (cm) 0 12/29/22 0845   Wound Assessment Graft 01/03/23 1042   Drainage Amount Moderate 01/03/23 1042   Drainage Description Serosanguinous; Yellow 01/03/23 1042   Odor Mild 01/03/23 1042   Kiah-wound Assessment Intact; Maceration 01/03/23 1042   Margins Defined edges 01/03/23 1042   Wound Thickness Description not for Pressure Injury Full thickness 01/03/23 1042   Number of days: 162       Wound 07/25/22 Leg Right;Posterior; Lower #3 (Active)   Wound Image   01/03/23 1118   Wound Etiology Venous 07/25/22 1040   Wound Cleansed Cleansed with saline 01/03/23 1042   Dressing/Treatment Pharmaceutical agent (see MAR) 01/03/23 1118   Wound Length (cm) 2.2 cm 01/03/23 1042   Wound Width (cm) 3 cm 01/03/23 1042   Wound Depth (cm) 0.1 cm 01/03/23 1042   Wound Surface Area (cm^2) 6.6 cm^2 01/03/23 1042   Change in Wound Size % (l*w) 85 01/03/23 1042   Wound Volume (cm^3) 0.66 cm^3 01/03/23 1042   Wound Healing % 85 01/03/23 1042   Post-Procedure Length (cm) 2.2 cm 01/03/23 1118   Post-Procedure Width (cm) 3 cm 01/03/23 1118   Post-Procedure Depth (cm) 0.1 cm 01/03/23 1118   Post-Procedure Surface Area (cm^2) 6.6 cm^2 01/03/23 1118   Post-Procedure Volume (cm^3) 0.66 cm^3 01/03/23 1118   Distance Tunneling (cm) 0 cm 12/29/22 0845   Tunneling Position ___ O'Clock 0 12/22/22 1520   Undermining Starts ___ O'Clock 0 12/22/22 1520   Undermining Ends___ O'Clock 0 12/22/22 1520   Undermining Maxium Distance (cm) 0 12/29/22 0845   Wound Assessment Graft 01/03/23 1042   Drainage Amount Moderate 01/03/23 1042   Drainage Description Brown 01/03/23 1042   Odor Mild 01/03/23 1042   Kiah-wound Assessment Intact; Maceration 01/03/23 1042   Margins Undefined edges 01/03/23 1042   Wound Thickness Description not for Pressure Injury Full thickness 01/03/23 1042   Number of days: 162          Plan:   Recent tissue culture confirms presence of Pseudomonas.   Will apply topical gentamicin to grafted sites and rewrap in 4 layer compression  Treatment Note please see attached Discharge Instructions    New Prescriptions    DOXYCYCLINE HYCLATE (VIBRA-TABS) 100 MG TABLET    Take 1 tablet by mouth 2 times daily Orders Placed This Encounter   Procedures    Initiate Outpatient Wound Care Protocol       Written patient dismissal instructions given to patient and signed by patient or POA. Discharge [address] [facility] Physician Orders and Discharge Instructions  [address] [address] [address]  Telephone: [phone]  NAME:  Robert Quick  YOB: 1965  MEDICAL RECORD NUMBER:  [id number]  DATE: 1/3/23     Return Appointment:  Return Appointment: With Dr. Angélica Richardson  in  1 [facility])  [] Return Appointment for a Wound Assessment with the nurse on:     Future Appointments   Date Time Provider Eloina Zamarripa   1/9/2023 10:30 AM [address]: none  Medically necessary services: [] Skilled Nursing [] PT (Eval & Treat) [] OT (Eval & Treat) [] Social Work [] Dietician  [] Other:    Wound care instructions: If you smoke we ask that you refrain from smoking. Smoking inhibits wounds from healing. When taking antibiotics take the entire prescription as ordered. Do not stop taking until medication is all gone unless otherwise instructed. Exercise as tolerated. Keep weight off wounds and reposition every 2 hours if applicable. Do not get wounds wet in the bath or shower unless otherwise instructed by your physician. If your wound is on your foot or leg, you may purchase a cast bag. Please ask at the pharmacy. Wash hands with soap and water prior to and after every dressing change. [x]Wash wounds with: Vashe wash - Apply enough Vashe to soak a piece of gauze and place on wound bed for 5-10 minutes. No need to rinse after soaking.   [x]Kiah wound Topical Treatments: Do not apply lotions, creams, or ointments to the skin around the wound bed unless directed as followed:   Apply around the wound: Zinc paste         [x]Wound Location: right lower leg wounds   Apply Primary Dressing to wound: Pharmaceutical medication: gentamicin ointment  Secondary Dressing: 4X4 gauze pad   Avoid contact of tape with skin if possible. When to change Dressing: DO NOT CHANGE    [x] Multilayer Compression Wrap:  Type: Applied on Right lower leg(s)  4 Layer Compression Wrap    Do not get leg(s) with wrap wet. If wraps become too tight call the center or completely remove the wrap. Elevate leg(s) above the level of the heart when sitting. Avoid prolonged standing in one place. Applied in Clinic on 1/3/2023  The Goal of this therapy is to reduce edema and get into long term compression garments to control venous insufficiency, lymphedema and reduce occurrence of venous ulcers    [x] Edema Control:  Apply: Compression Stocking on the Left leg  Apply every morning immediately when getting up. Remove every night before going to bed unless instructed otherwise     Elevate leg(s) above the level of the heart for 30 minutes 4-5 times a day and/or when sitting. Avoid prolonged standing in one place. [x] Lymphedema Therapy:  Wear Lymphedema pumps twice a day at settings prescribed by your physician. Avoid prolonged standing in one place. Dietary:  Important dietary reminders:  1. Increase Protein intake (i.e. Lean meats, fish, eggs, legumes, and yogurt)  2. No added salt  3. If diabetic, follow a diabetic diet and check glucose prior to meals or as instructed by your physician. Dietary Supplements(Take twice a day unless instructed otherwise):  [] Patrick   [] 30ml ProStat [] Vallarie Miser [] Ensure Max/Premier [] Other:    Your nurse  is:  Carly Perla     Electronically signed by Merlinda Clay, RN on 1/3/2023 at [id number] Information: Should you experience any significant changes in your wound(s) or have questions about your wound care, please contact the [address] [place] [address] at [phone].  We are open from 8:00am - 4:30p Monday, Thursday and Friday; 11:00am - 5pm on Tuesday and CLOSED Wednesday. Please give us 24-48 business hours to return your call. Call your doctor now or seek immediate medical care if:    You have symptoms of infection, such as: Increased pain, swelling, warmth, or redness. Red streaks leading from the area. Pus draining from the area. A fever.          [] Patient unable to sign Discharge Instructions given to ECF/Transportation/POA             Electronically signed by Lashanda Sevilla MD on 1/3/2023 at 11:41 AM

## 2023-01-09 ENCOUNTER — ANTI-COAG VISIT (OUTPATIENT)
Dept: PHARMACY | Age: 58
End: 2023-01-09
Payer: COMMERCIAL

## 2023-01-09 ENCOUNTER — HOSPITAL ENCOUNTER (OUTPATIENT)
Dept: WOUND CARE | Age: 58
Discharge: HOME | End: 2023-01-09
Payer: COMMERCIAL

## 2023-01-09 VITALS
HEART RATE: 74 BPM | RESPIRATION RATE: 16 BRPM | TEMPERATURE: 96.8 F | SYSTOLIC BLOOD PRESSURE: 159 MMHG | DIASTOLIC BLOOD PRESSURE: 82 MMHG

## 2023-01-09 DIAGNOSIS — I89.0 CHRONIC ACQUIRED LYMPHEDEMA: Primary | ICD-10-CM

## 2023-01-09 DIAGNOSIS — Z91.199 NONCOMPLIANCE: ICD-10-CM

## 2023-01-09 DIAGNOSIS — I82.5Z1 CHRONIC VENOUS EMBOLISM AND THROMBOSIS OF DEEP VESSELS OF DISTAL END OF RIGHT LOWER EXTREMITY (HCC): Primary | ICD-10-CM

## 2023-01-09 DIAGNOSIS — L97.919 IDIOPATHIC CHRONIC VENOUS HYPERTENSION OF RIGHT LOWER EXTREMITY WITH ULCER (HCC): ICD-10-CM

## 2023-01-09 DIAGNOSIS — I87.311 IDIOPATHIC CHRONIC VENOUS HYPERTENSION OF RIGHT LOWER EXTREMITY WITH ULCER (HCC): ICD-10-CM

## 2023-01-09 LAB — INTERNATIONAL NORMALIZATION RATIO, POC: 2.9

## 2023-01-09 PROCEDURE — 11045 DBRDMT SUBQ TISS EACH ADDL: CPT

## 2023-01-09 PROCEDURE — 11045 DBRDMT SUBQ TISS EACH ADDL: CPT | Performed by: SPECIALIST

## 2023-01-09 PROCEDURE — 6370000000 HC RX 637 (ALT 250 FOR IP): Performed by: SPECIALIST

## 2023-01-09 PROCEDURE — 85610 PROTHROMBIN TIME: CPT | Performed by: PHARMACIST

## 2023-01-09 PROCEDURE — 11042 DBRDMT SUBQ TIS 1ST 20SQCM/<: CPT | Performed by: SPECIALIST

## 2023-01-09 PROCEDURE — 11042 DBRDMT SUBQ TIS 1ST 20SQCM/<: CPT

## 2023-01-09 PROCEDURE — 29581 APPL MULTLAYER CMPRN SYS LEG: CPT

## 2023-01-09 PROCEDURE — 99211 OFF/OP EST MAY X REQ PHY/QHP: CPT | Performed by: PHARMACIST

## 2023-01-09 RX ORDER — BETAMETHASONE DIPROPIONATE 0.05 %
OINTMENT (GRAM) TOPICAL ONCE
OUTPATIENT
Start: 2023-01-09 | End: 2023-01-09

## 2023-01-09 RX ORDER — LIDOCAINE HYDROCHLORIDE 20 MG/ML
JELLY TOPICAL ONCE
OUTPATIENT
Start: 2023-01-09 | End: 2023-01-09

## 2023-01-09 RX ORDER — LIDOCAINE HYDROCHLORIDE 40 MG/ML
SOLUTION TOPICAL ONCE
Status: COMPLETED | OUTPATIENT
Start: 2023-01-09 | End: 2023-01-09

## 2023-01-09 RX ORDER — CLOBETASOL PROPIONATE 0.5 MG/G
OINTMENT TOPICAL ONCE
OUTPATIENT
Start: 2023-01-09 | End: 2023-01-09

## 2023-01-09 RX ORDER — GENTAMICIN SULFATE 1 MG/G
OINTMENT TOPICAL ONCE
OUTPATIENT
Start: 2023-01-09 | End: 2023-01-09

## 2023-01-09 RX ORDER — LIDOCAINE HYDROCHLORIDE 40 MG/ML
SOLUTION TOPICAL ONCE
OUTPATIENT
Start: 2023-01-09 | End: 2023-01-09

## 2023-01-09 RX ORDER — LIDOCAINE 50 MG/G
OINTMENT TOPICAL ONCE
OUTPATIENT
Start: 2023-01-09 | End: 2023-01-09

## 2023-01-09 RX ORDER — LIDOCAINE 40 MG/G
CREAM TOPICAL ONCE
OUTPATIENT
Start: 2023-01-09 | End: 2023-01-09

## 2023-01-09 RX ORDER — BACITRACIN, NEOMYCIN, POLYMYXIN B 400; 3.5; 5 [USP'U]/G; MG/G; [USP'U]/G
OINTMENT TOPICAL ONCE
OUTPATIENT
Start: 2023-01-09 | End: 2023-01-09

## 2023-01-09 RX ORDER — BACITRACIN ZINC AND POLYMYXIN B SULFATE 500; 1000 [USP'U]/G; [USP'U]/G
OINTMENT TOPICAL ONCE
OUTPATIENT
Start: 2023-01-09 | End: 2023-01-09

## 2023-01-09 RX ORDER — GINSENG 100 MG
CAPSULE ORAL ONCE
OUTPATIENT
Start: 2023-01-09 | End: 2023-01-09

## 2023-01-09 RX ADMIN — LIDOCAINE HYDROCHLORIDE: 40 SOLUTION TOPICAL at 10:02

## 2023-01-09 ASSESSMENT — PAIN DESCRIPTION - FREQUENCY: FREQUENCY: CONTINUOUS

## 2023-01-09 ASSESSMENT — PAIN DESCRIPTION - LOCATION: LOCATION: LEG

## 2023-01-09 ASSESSMENT — PAIN SCALES - GENERAL: PAINLEVEL_OUTOF10: 5

## 2023-01-09 ASSESSMENT — PAIN DESCRIPTION - DESCRIPTORS: DESCRIPTORS: BURNING

## 2023-01-09 ASSESSMENT — PAIN DESCRIPTION - ORIENTATION: ORIENTATION: RIGHT

## 2023-01-09 ASSESSMENT — PAIN DESCRIPTION - PAIN TYPE: TYPE: CHRONIC PAIN

## 2023-01-09 NOTE — PROGRESS NOTES
Multilayer Compression Wrap   (Not Unna) Below the Knee    NAME:  Nathanael Poe  YOB: 1965  MEDICAL RECORD NUMBER:  [id number]  DATE:  1/9/2023       Removed old Multilayer wrap if present and washed leg with mild soap/water. Applied moisturizing agent to dry skin as needed. Applied primary and secondary dressing as ordered    Applied multilayered dressing below the knee to Right lower leg(s)  (4 Layer Compression Wrap ) . Instructed patient/caregiver not to remove dressing and to keep it clean and dry. Instructed patient/caregiver on complications to report to provider, such as pain, numbness in toes, heavy drainage, and slippage of dressing. Instructed patient on purpose of compression dressing and on activity and exercise recommendations.    Applied per   Guidelines    Electronically signed by Mandeep Mtz RN on 1/9/2023 at 11:15 AM

## 2023-01-09 NOTE — DISCHARGE INSTRUCTIONS
[address] [facility] Physician Orders and Discharge Instructions  [address] [address] [facility]. [address] [place]  Telephone: [phone]  NAME:  Leslie Antony  YOB: 1965  MEDICAL RECORD NUMBER:  [id number]  DATE: 1/9/23     Return Appointment:  Return Appointment: With Nhung Jones MD  in  [facility])  [x] Return Appointment for a Wound Assessment with the nurse on: 01/13/23    No future appointments. [address]: none  Medically necessary services: [] Skilled Nursing [] PT (Eval & Treat) [] OT (Eval & Treat) [] Social Work [] Dietician  [] Other:    Wound care instructions: If you smoke we ask that you refrain from smoking. Smoking inhibits wounds from healing. When taking antibiotics take the entire prescription as ordered. Do not stop taking until medication is all gone unless otherwise instructed. Exercise as tolerated. Keep weight off wounds and reposition every 2 hours if applicable. Do not get wounds wet in the bath or shower unless otherwise instructed by your physician. If your wound is on your foot or leg, you may purchase a cast bag. Please ask at the pharmacy. Wash hands with soap and water prior to and after every dressing change. [x]Wash wounds with: Vashe wash - Apply enough Vashe to soak a piece of gauze and place on wound bed for 5-10 minutes. No need to rinse after soaking. [x]Kiah wound Topical Treatments: Do not apply lotions, creams, or ointments to the skin around the wound bed unless directed as followed:   Apply around the wound: Nothing         [x]Wound Location: right lower leg   Apply Primary Dressing to wound: Alginate with silver pad  Secondary Dressing: 4X4 gauze pad   Avoid contact of tape with skin if possible. When to change Dressing: DO NOT CHANGE        [x] Multilayer Compression Wrap:  Type: Applied on Right lower leg(s)  4 Layer Compression Wrap    Do not get leg(s) with wrap wet.   If wraps become too tight call the center or completely remove the wrap. Elevate leg(s) above the level of the heart when sitting. Avoid prolonged standing in one place. Applied in Clinic on 1/9/2023  The Goal of this therapy is to reduce edema and get into long term compression garments to control venous insufficiency, lymphedema and reduce occurrence of venous ulcers    [x] Edema Control:  Apply: Compression Stocking on the Left leg  Apply every morning immediately when getting up. Remove every night before going to bed unless instructed otherwise     Elevate leg(s) above the level of the heart for 30 minutes 4-5 times a day and/or when sitting. Avoid prolonged standing in one place. [x] Lymphedema Therapy:  Wear Lymphedema pumps twice a day at settings prescribed by your physician. Avoid prolonged standing in one place. Dietary:  Important dietary reminders:  1. Increase Protein intake (i.e. Lean meats, fish, eggs, legumes, and yogurt)  2. No added salt  3. If diabetic, follow a diabetic diet and check glucose prior to meals or as instructed by your physician. Dietary Supplements(Take twice a day unless instructed otherwise):  [] Joel Sherin  [] 30ml ProStat [] Sidonie Maha [] Ensure Max/Premier [] Other:    Your nurse  is:  Bobby Dow     Electronically signed by Estrellita Lopes RN on 1/9/2023 at 11:06 AM     [address] [facility] Information: Should you experience any significant changes in your wound(s) or have questions about your wound care, please contact the [address] [place] [address] at [phone]. We are open from 8:00am - 4:30p Monday, Thursday and Friday; 11:00am - 5pm on Tuesday and CLOSED Wednesday. Please give us 24-48 business hours to return your call. Call your doctor now or seek immediate medical care if:    You have symptoms of infection, such as: Increased pain, swelling, warmth, or redness. Red streaks leading from the area. Pus draining from the area. A fever.          [] Patient unable to sign Discharge Instructions given to ECF/Transportation/POA

## 2023-01-09 NOTE — PROGRESS NOTES
[facility]  Progress Note and Procedure Note      Hayes Nava  MEDICAL RECORD NUMBER:  [id number]  AGE: 62 y.o. GENDER: male  : 1965  EPISODE DATE:  2023    Subjective:     Chief Complaint   Patient presents with    Wound Check     Right lower leg         HISTORY of PRESENT ILLNESS HPI     Hayes Nava is a 62 y.o. male who presents today for wound/ulcer evaluation. History of Wound Context: Patient continues follow-up for chronic venous insufficiency with ulceration and significant lymphedema right lower extremity. He is now 2 weeks post third TheraSkin allograft application to right lower extremity ulcers. He describes moderate amount of drainage compression wrap.   He is still not utilizing his lymphedema pumps as he should  Wound/Ulcer Pain Timing/Severity: none  Quality of pain: N/A  Severity:  0 / 10   Modifying Factors: None  Associated Signs/Symptoms: edema    Ulcer Identification:  Ulcer Type: venous    Contributing Factors: edema, venous stasis, lymphedema, obesity, and anticoagulation therapy    Acute Wound: N/A not an acute wound    PAST MEDICAL HISTORY        Diagnosis Date    DVT (deep venous thrombosis) (HCC)     Hx of blood clots     Pain and swelling of right lower leg        PAST SURGICAL HISTORY    Past Surgical History:   Procedure Laterality Date    [place]GIOPLAST[place] [facility] [place]      SKIN SPLIT GRAFT Right        FAMILY HISTORY    Family History   Problem Relation Age of Onset    Diabetes Mother     Heart Attack Father        SOCIAL HISTORY    Social History     Tobacco Use    Smoking status: Never    Smokeless tobacco: Never   Vaping Use    Vaping Use: Never used   Substance Use Topics    Alcohol use: No    Drug use: No       ALLERGIES    No Known Allergies    MEDICATIONS    Current Outpatient Medications on File Prior to Encounter   Medication Sig Dispense Refill    doxycycline hyclate (VIBRA-TABS) 100 MG tablet Take 1 tablet by mouth 2 times daily 60 tablet 0    ibuprofen (ADVIL;MOTRIN) 200 MG tablet Take 200 mg by mouth every 6 hours as needed for Pain      triamcinolone (KENALOG) 0.1 % ointment Apply topically 2 times daily (Patient not taking: No sig reported) 453.6 g 1    warfarin (COUMADIN) 5 MG tablet TAKE ONE AND ONE-HALF TABLETS BY MOUTH DAILY EXCEPT TAKE 2 TABLETS DAILY ON FRIDAY 180 tablet 1    Compression Stockings MISC by Does not apply route Strength 30-40mmHg  Style: Other pull up compression stockings  Extremity: bilateral  Donning aid recommended: No 1 each 0     No current facility-administered medications on file prior to encounter. REVIEW OF SYSTEMS  Review of Systems    Pertinent items are noted in HPI. Objective:      BP (!) 159/82   Pulse 74   Temp 96.8 °F (36 °C) (Temporal)   Resp 16     Wt Readings from Last 3 Encounters:   04/25/22 251 lb 6.4 oz (114 kg)   04/18/22 251 lb 6.4 oz (114 kg)   10/27/21 244 lb (110.7 kg)       PHYSICAL EXAM    General Appearance: alert and oriented to person, place and time, well-developed and well-nourished, in no acute distress  Extremities: no cyanosis, no clubbing, 3+ edema-  right lower extremity, and 3 full-thickness ulceration present right anterior medial and lateral knee containing residual TheraSkin allograft and fibrin. Significant periwound maceration right medial knee    Assessment:      1. Chronic acquired lymphedema    2. Idiopathic chronic venous hypertension of right lower extremity with ulcer ([address]    3. Noncompliance         Procedure Note  Indications:  Based on my examination of this patient's wound(s) today, sharp excision is required to promote healing and evaluate the extent healing. Performed by: Yessi Pinon MD    Consent obtained?  Yes    Time out taken: Yes    Pain Control: Anesthetic: 4% Lidocaine Liquid Topical     Debridement:Excisional Debridement    Using curette the wound was sharply debrided    down through and including the removal of  epidermis, dermis, and subcutaneous tissue. Devitalized Tissue Debrided:  fibrin and biofilm      Pre Debridement Measurements:  Are located in the Wound Documentation Flow Sheet   Wound #: 1, 2, and 3     Post  Debridement Measurements:  Wound 07/25/22 Leg Right; Lower;Medial #1 (Active)   Wound Image   01/03/23 1118   Wound Etiology Venous 07/25/22 1040   Wound Cleansed Cleansed with saline 01/09/23 1004   Dressing/Treatment Alginate with Ag 01/09/23 1114   Wound Length (cm) 3.7 cm 01/09/23 1004   Wound Width (cm) 3 cm 01/09/23 1004   Wound Depth (cm) 0.1 cm 01/09/23 1004   Wound Surface Area (cm^2) 11.1 cm^2 01/09/23 1004   Change in Wound Size % (l*w) 53.07 01/09/23 1004   Wound Volume (cm^3) 1.11 cm^3 01/09/23 1004   Wound Healing % 53 01/09/23 1004   Post-Procedure Length (cm) 3.8 cm 01/09/23 1008   Post-Procedure Width (cm) 3.1 cm 01/09/23 1008   Post-Procedure Depth (cm) 0.3 cm 01/09/23 1008   Post-Procedure Surface Area (cm^2) 11.78 cm^2 01/09/23 1008   Post-Procedure Volume (cm^3) 3.534 cm^3 01/09/23 1008   Distance Tunneling (cm) 0 cm 12/29/22 0845   Tunneling Position ___ O'Clock 0 12/22/22 1520   Undermining Starts ___ O'Clock 0 12/22/22 1520   Undermining Ends___ O'Clock 0 12/22/22 1520   Undermining Maxium Distance (cm) 0 12/29/22 0845   Wound Assessment Graft;Slough 01/09/23 1004   Drainage Amount Large 01/09/23 1004   Drainage Description Yellow;Serosanguinous 01/09/23 1004   Odor Mild 01/09/23 1004   Kiah-wound Assessment Erosion; Maceration 01/09/23 1004   Margins Defined edges 01/09/23 1004   Wound Thickness Description not for Pressure Injury Full thickness 01/09/23 1004   Number of days: 168       Wound 07/25/22 Leg Right; Anterior; Lower #2 (Active)   Wound Image   01/03/23 1118   Wound Etiology Venous 07/25/22 1040   Wound Cleansed Cleansed with saline 01/09/23 1004   Dressing/Treatment Alginate with Ag 01/09/23 1114   Wound Length (cm) 2.2 cm 01/09/23 1004   Wound Width (cm) 5.6 cm 01/09/23 1004   Wound Depth (cm) 0.1 cm 01/09/23 1004   Wound Surface Area (cm^2) 12.32 cm^2 01/09/23 1004   Change in Wound Size % (l*w) 72.98 01/09/23 1004   Wound Volume (cm^3) 1.232 cm^3 01/09/23 1004   Wound Healing % 73 01/09/23 1004   Post-Procedure Length (cm) 2.3 cm 01/09/23 1008   Post-Procedure Width (cm) 5.7 cm 01/09/23 1008   Post-Procedure Depth (cm) 0.3 cm 01/09/23 1008   Post-Procedure Surface Area (cm^2) 13.11 cm^2 01/09/23 1008   Post-Procedure Volume (cm^3) 3.933 cm^3 01/09/23 1008   Distance Tunneling (cm) 0 cm 12/29/22 0845   Tunneling Position ___ O'Clock 0 12/22/22 1520   Undermining Starts ___ O'Clock 0 12/22/22 1520   Undermining Ends___ O'Clock 0 12/22/22 1520   Undermining Maxium Distance (cm) 0 12/29/22 0845   Wound Assessment Slough;Pink/red;Graft; Other (Comment) 01/09/23 1004   Drainage Amount Moderate 01/09/23 1004   Drainage Description Serosanguinous; Yellow 01/09/23 1004   Odor Mild 01/09/23 1004   Kiah-wound Assessment Intact 01/09/23 1004   Margins Defined edges 01/09/23 1004   Wound Thickness Description not for Pressure Injury Full thickness 01/03/23 1042   Number of days: 168       Wound 07/25/22 Leg Right;Posterior; Lower #3 (Active)   Wound Image   01/03/23 1118   Wound Etiology Venous 07/25/22 1040   Wound Cleansed Cleansed with saline 01/09/23 1004   Dressing/Treatment Alginate with Ag 01/09/23 1114   Wound Length (cm) 3 cm 01/09/23 1004   Wound Width (cm) 2.8 cm 01/09/23 1004   Wound Depth (cm) 0.1 cm 01/09/23 1004   Wound Surface Area (cm^2) 8.4 cm^2 01/09/23 1004   Change in Wound Size % (l*w) 80.91 01/09/23 1004   Wound Volume (cm^3) 0.84 cm^3 01/09/23 1004   Wound Healing % 81 01/09/23 1004   Post-Procedure Length (cm) 3.1 cm 01/09/23 1008   Post-Procedure Width (cm) 2.9 cm 01/09/23 1008   Post-Procedure Depth (cm) 0.3 cm 01/09/23 1008   Post-Procedure Surface Area (cm^2) 8.99 cm^2 01/09/23 1008   Post-Procedure Volume (cm^3) 2.697 cm^3 01/09/23 1008   Distance Tunneling (cm) 0 cm 12/29/22 0845   Tunneling Position ___ O'Clock 0 12/22/22 1520   Undermining Starts ___ O'Clock 0 12/22/22 1520   Undermining Ends___ O'Clock 0 12/22/22 1520   Undermining Maxium Distance (cm) 0 12/29/22 0845   Wound Assessment Slough;[place]/red 01/09/23 1004   Drainage Amount Large 01/09/23 1004   Drainage Description Serosanguinous 01/09/23 1004   Odor Mild 01/09/23 1004   Kiah-wound Assessment Erosion; Maceration 01/09/23 1004   Margins Undefined edges 01/09/23 1004   Wound Thickness Description not for Pressure Injury Full thickness 01/09/23 1004   Number of days: 168          Percent of Wound Debrided: 100%    Total Surface Area Debrided:  33 sq cm    Diabetic/Pressure/Non Pressure Ulcers only:  Ulcer: Non-Pressure ulcer, fat layer exposed    Bleeding: Minimal    Hemostasis Achieved: by pressure    Procedural Pain: 0  / 10     Post Procedural Pain: 0 / 10     Response to treatment:  Well tolerated by patient. Plan:     Treatment Note: Please see attached Discharge Instructions. These instructions were given and signed by the patient or POA    New Prescriptions    No medications on file       Orders Placed This Encounter   Procedures    Initiate Outpatient Wound Care Protocol       Discharge Instructions          [facility] Physician Orders and Discharge Instructions  [address] [address] [address] [place] [address]. [place]  Telephone: [phone]  NAME:  Dinorah Booker  YOB: 1965  MEDICAL RECORD NUMBER:  [id number]  DATE: 1/9/23     Return Appointment:  Return Appointment: With Say Morales MD  in  [facility])  [x] Return Appointment for a Wound Assessment with the nurse on: 01/13/23    No future appointments.       [address]: none  Medically necessary services: [] Skilled Nursing [] PT (Eval & Treat) [] OT (Eval & Treat) [] Social Work [] Dietician  [] Other:    Wound care instructions: If you smoke we ask that you refrain from smoking. Smoking inhibits wounds from healing. When taking antibiotics take the entire prescription as ordered. Do not stop taking until medication is all gone unless otherwise instructed. Exercise as tolerated. Keep weight off wounds and reposition every 2 hours if applicable. Do not get wounds wet in the bath or shower unless otherwise instructed by your physician. If your wound is on your foot or leg, you may purchase a cast bag. Please ask at the pharmacy. Wash hands with soap and water prior to and after every dressing change. [x]Wash wounds with: Vashe wash - Apply enough Vashe to soak a piece of gauze and place on wound bed for 5-10 minutes. No need to rinse after soaking. [x]Kiah wound Topical Treatments: Do not apply lotions, creams, or ointments to the skin around the wound bed unless directed as followed:   Apply around the wound: Nothing         [x]Wound Location: right lower leg   Apply Primary Dressing to wound: Alginate with silver pad  Secondary Dressing: 4X4 gauze pad   Avoid contact of tape with skin if possible. When to change Dressing: DO NOT CHANGE        [x] Multilayer Compression Wrap:  Type: Applied on Right lower leg(s)  4 Layer Compression Wrap    Do not get leg(s) with wrap wet. If wraps become too tight call the center or completely remove the wrap. Elevate leg(s) above the level of the heart when sitting. Avoid prolonged standing in one place. Applied in Clinic on 1/9/2023  The Goal of this therapy is to reduce edema and get into long term compression garments to control venous insufficiency, lymphedema and reduce occurrence of venous ulcers    [x] Edema Control:  Apply: Compression Stocking on the Left leg  Apply every morning immediately when getting up.  Remove every night before going to bed unless instructed otherwise     Elevate leg(s) above the level of the heart for 30 minutes 4-5 times a day and/or when sitting. Avoid prolonged standing in one place. [x] Lymphedema Therapy:  Wear Lymphedema pumps twice a day at settings prescribed by your physician. Avoid prolonged standing in one place. Dietary:  Important dietary reminders:  1. Increase Protein intake (i.e. Lean meats, fish, eggs, legumes, and yogurt)  2. No added salt  3. If diabetic, follow a diabetic diet and check glucose prior to meals or as instructed by your physician. Dietary Supplements(Take twice a day unless instructed otherwise):  [] Charlet Salaam  [] 30ml ProStat [] Nonnie Darcy [] Ensure Max/Premier [] Other:    Your nurse  is:  Rafael Silva     Electronically signed by Luis Parikh RN on 1/9/2023 at 11:06 AM     [address] [facility] [address] Information: Should you experience any significant changes in your wound(s) or have questions about your wound care, please contact the [address] [place] [address] at [phone]. We are open from 8:00am - 4:30p Monday, Thursday and Friday; 11:00am - 5pm on Tuesday and CLOSED Wednesday. Please give us 24-48 business hours to return your call. Call your doctor now or seek immediate medical care if:    You have symptoms of infection, such as: Increased pain, swelling, warmth, or redness. Red streaks leading from the area. Pus draining from the area. A fever.          [] Patient unable to sign Discharge Instructions given to ECF/Transportation/POA         Electronically signed by Papo Mchugh MD on 1/9/2023 at 12:29 PM

## 2023-01-09 NOTE — PROGRESS NOTES
ANTICOAGULATION SERVICE    Jeanette Russo is a 62 y.o. male with PMHx significant for chronic DVT (last in Jan, 2019) who presents to clinic 1/9/2023 for anticoagulation monitoring and adjustment. Patient has been taking warfarin for 15+ years.      Anticoagulation Indication(s):  DVT    Referring Physician:  Dr. Heidy Gomez  Goal INR Range:  2-3  Duration of Anticoagulation Therapy:  Indefinite  Time of day dose taken:  AM  Product patient has at home:  warfarin 5 mg (peach)    INR Summary                            Warfarin regimen (mg)  Date INR   A/P    Sun Mon Tue Wed Thu Fri Sat Mg/wk  1/9 2.9 At goal, continue  7.5 5 7.5 7.5 7.5 5 7.5 47.5  12/5 2.5 At goal, continue  7.5 5 7.5 7.5 7.5 5 7.5 47.5  11/7 2.3 At goal, continue  7.5 5 7.5 7.5 7.5 5 7.5 47.5  10/10 2.9 At goal, continue  7.5 5 7.5 7.5 7.5 5 7.5 47.5  7/27 3.1 Above goal, continue    7.5 5 7.5 7.5 7.5 5 7.5 47.5  7/6 3.1 Above goal, decrease  7.5 5 7.5 7.5 7.5 5 7.5 47.5  6/22 2.4 At goal, resume prv dose 7.5 7.5 7.5 7.5 7.5 5 7.5 50  6/1 2.6 At goal, continue   7.5 5 7.5 5 7.5 5 7.5 45  5/18 4.1 Above goal, dec (abx)  7.5 5 7.5 5 7.5 5 7.5 45  4/6 2.8 At goal, continue  7.5 7.5 7.5 7.5 7.5 5 7.5 50  12/1 2.9 At goal, continue  7.5 7.5 7.5 7.5 7.5 5 7.5 50  11/10 2.4 At goal, continue   7.5 7.5 7.5 7.5 7.5 5 7.5 50  10/27 4.4 Above goal, hold+dec  7.5 7.5 7.5 7.5 0/7.5 5 7.5 50  8/25 2.9 At goal, continue  7.5 7.5 7.5 7.5 7.5 7.5 7.5 52.5  7/14 2.5 At goal, continue   7.5 7.5 7.5 7.5 7.5 7.5 7.5 52.5  6/14 2.9 At goal, continue  7.5 7.5 7.5 7.5 0/7.5 7.5 7.5 52.5  5/26 3.9 Above goal (ABX), hold x 1 7.5 7.5 7.5 7.5 0/7.5 7.5 7.5 52.5  3/31 2.5 At goal, no change  7.5 7.5 7.5 7.5 7.5 7.5 7.5 52.5  2/17 3.0 At goal, no change  7.5 7.5 7.5 7.5 7.5 7.5 7.5 52.5  1/6 2.8 At goal, no change  7.5 7.5 7.5 7.5 7.5 7.5 7.5 52.5  12/2 2.9 At goal, no change  7.5 7.5 7.5 7.5 7.5 7.5 7.5 52.5  11/4 3.1 Above goal, continue  7.5 7.5 7.5 7.5 7.5 7.5 7.5 52.5  10/7 2.7 At goal, no change  7.5 7.5 7.5 7.5 7.5 7.5 7.5 52.5  9/9 2.7 At goal, no change  7.5 7.5 7.5 7.5 7.5 7.5 7.5 52.5  8/17 2.8 At goal, no change  7.5 7.5 7.5 7.5 7.5 7.5 7.5 52.5  8/3 3.4 Above goal, decrease  7.5 7.5 7.5 7.5 7.5 7.5 7.5 52.5  6/8 2.9 At goal, no change  7.5 7.5 7.5 7.5 7.5 10 7.5 55  2/28 2.7 At goal, no change  7.5 7.5 7.5 7.5 7.5 10 7.5 55  1/3 2.6 At goal, no change  7.5 7.5 7.5 7.5 7.5 10 7.5 55  11/20 2.8 At goal, no change  7.5 7.5 7.5 7.5 7.5 10 7.5 55  10/23 2.6 At goal, no change  7.5 7.5 7.5 7.5 7.5 10 7.5 55  8/23 2.2 At goal, no change  7.5 7.5 7.5 7.5 7.5 10 7.5 55  7/26 2.5 At goal, no change  7.5 7.5 7.5 7.5 7.5 10 7.5 55  6/28 2.3 At goal, no change  7.5 7.5 7.5 7.5 7.5 10 7.5 55  5/31 2.6 At goal, no change  7.5 7.5 7.5 7.5 7.5 10 7.5 55  5/1 2.5 At goal, no change  7.5 7.5 7.5 7.5 7.5 10 7.5 55  4/10 2.0 At goal, no change  7.5 7.5 7.5 7.5 7.5 10 7.5 55  3/13 2.6 At goal, no change  7.5 7.5 7.5 7.5 7.5 10 7.5 55  2/27 2.7 At goal, no change  7.5 7.5 7.5 7.5 7.5 10 7.5 55  2/20 2.9 At goal, no change  7.5 7.5 7.5 7.5 7.5 10 7.5 55  2/13 2.1 At goal, no change  7.5 7.5 7.5 7.5 7.5 10 7.5 55    Last CBC:  Lab Results   Component Value Date    RBC 4.28 05/03/2022    HGB 10.8 (L) 05/03/2022    HCT 33.3 (L) 05/03/2022    MCV 77.7 (L) 05/03/2022    MCH 25.3 (L) 05/03/2022    MPV 6.5 05/03/2022    RDW 17.2 (H) 05/03/2022     05/03/2022     Patient History:  Recent hospitalizations/HC visits 5/9/22: podiatry continue cipro+clinda x6 more weeks  7/28/21 ID: patient will potentially need IV abx.  Continue following with wound care    Recent medication changes 5/2/22-current: ciprofloxacin 500 mg BID + clindamycin 300 mg TID (anticipated stop 6/20/22)    Medications taken regularly that may interact with warfarin or alter INR None reported   Warfarin dose taken as prescribed Yes - does not use pillbox (only takes warfarin)   Signs/symptoms of bleeding No h/o bleeding reported   Vitamin K intake Normally avoids high vitamin K foods: ~0-1 serving per week   Recent vomiting/diarrhea/fever, changes in weight or activity level None reported   Tobacco or alcohol use Patient reports smoking 0 PPD  Patient reports having 0 drinks per day   Upcoming surgeries or procedures None reported     Assessment/Plan:  Patient's INR was therapeutic again today (2.9). Patient has been on doxycycline long term, but recently stopped for a couple of weeks and restarted. This drug can increase the INR, but does not appear to have affected his INR in the past. Patient denies any missed/extra warfarin doses, diet changes, illness, bleeding, etc since last visit. Patient was instructed to continue warfarin to 7.5 mg daily except 5 mg on Monday and Friday. Repeat INR in 5 weeks. Patient prefers to extend interval between appointments. But he is now seeing wound care every Monday and will try to coordinate appts. Patient was reminded to maintain consistent vitamin K intake and call with any bleeding, medication changes, or fever/vomiting/diarrhea. Patient understands dosing directions and information discussed. Dosing schedule and follow up appointment given to patient. Progress note routed to referring physician's office. Patient acknowledges working in consult agreement with pharmacist as referred by his/her physician. Next Clinic Appointment:  2/13    Please call [facility] at [phone] with any questions. Thanks!   Shara Sanchez, PharmD, BCPS  [facility]  Armando: [phone]  [place]na: [phone]  1/9/2023 11:35 AM    For Pharmacy Admin Tracking Only    Total # of Interventions Recommended: 0  Total # of Interventions Accepted: 0  Time Spent (min): 15

## 2023-01-12 ENCOUNTER — HOSPITAL ENCOUNTER (OUTPATIENT)
Dept: WOUND CARE | Age: 58
Discharge: HOME | End: 2023-01-12
Payer: COMMERCIAL

## 2023-01-12 VITALS
SYSTOLIC BLOOD PRESSURE: 159 MMHG | RESPIRATION RATE: 16 BRPM | HEART RATE: 77 BPM | TEMPERATURE: 96.9 F | DIASTOLIC BLOOD PRESSURE: 84 MMHG

## 2023-01-12 DIAGNOSIS — Z91.199 NONCOMPLIANCE: ICD-10-CM

## 2023-01-12 DIAGNOSIS — L97.919 IDIOPATHIC CHRONIC VENOUS HYPERTENSION OF RIGHT LOWER EXTREMITY WITH ULCER (HCC): ICD-10-CM

## 2023-01-12 DIAGNOSIS — I87.311 IDIOPATHIC CHRONIC VENOUS HYPERTENSION OF RIGHT LOWER EXTREMITY WITH ULCER (HCC): ICD-10-CM

## 2023-01-12 DIAGNOSIS — I89.0 CHRONIC ACQUIRED LYMPHEDEMA: Primary | ICD-10-CM

## 2023-01-12 PROCEDURE — 29581 APPL MULTLAYER CMPRN SYS LEG: CPT

## 2023-01-12 ASSESSMENT — PAIN DESCRIPTION - LOCATION: LOCATION: LEG

## 2023-01-12 ASSESSMENT — PAIN SCALES - GENERAL: PAINLEVEL_OUTOF10: 5

## 2023-01-12 ASSESSMENT — PAIN DESCRIPTION - PAIN TYPE: TYPE: CHRONIC PAIN

## 2023-01-12 ASSESSMENT — PAIN DESCRIPTION - FREQUENCY: FREQUENCY: CONTINUOUS

## 2023-01-12 ASSESSMENT — PAIN DESCRIPTION - DESCRIPTORS: DESCRIPTORS: ACHING

## 2023-01-12 ASSESSMENT — PAIN DESCRIPTION - ORIENTATION: ORIENTATION: RIGHT

## 2023-01-12 NOTE — DISCHARGE INSTRUCTIONS
[facility] Physician Orders and Discharge Instructions  The [address] [address] [place] [address], [address] [facility]  Telephone: [phone]    NAME:  Opal Muller:  1965  MEDICAL RECORD NUMBER:  [id number]  DATE:  1/12/2023      Wound care:  Continue to follow the instructions and recommendations from your last doctor visit. The dressing(s) applied is the same as your last visit. Please refer to your last discharge instruction for the information on your wound care. If there were any changes made, please follow the instructions as written here: n/a    Future Appointments     Future Appointments   Date Time Provider Eloina Zamarripa   1/16/2023  9:45 AM MD Gina Leroy WOUND [facility]   2/13/2023  9:30 AM Mike Dominguez [facility]           Your nurse  is:  Deana Pallas     Electronically signed by Cristina Martinez RN on 1/12/2023 at [address] Information: Should you experience any significant changes in your wound(s) or have questions about your wound care, please contact the [address] [place] [address] at [phone]. Our hours vary so please leave a message. Please give us 24-48 hours to return your call. If you need help with your wounds and cannot wait until we are available, contact your PCP or go to the hospital emergency room. Physician orders by:  Dr. Richard Del Cid        The information contained in the After Visit Summary has been reviewed with me, the patient and/or responsible adult, by my health care provider(s). I had the opportunity to ask questions regarding this information. I have elected to receive;      [] Patient unable to sign Discharge Instructions.  Given to ECF/Transportation/POA

## 2023-01-12 NOTE — PROGRESS NOTES
Multilayer Compression Wrap   (Not Unna) Below the Knee    NAME:  Delmy Crenshaw  YOB: 1965  MEDICAL RECORD NUMBER:  [id number]  DATE:  1/12/2023       Removed old Multilayer wrap if present and washed leg with mild soap/water. Applied moisturizing agent to dry skin as needed. Applied primary and secondary dressing as ordered    Applied multilayered dressing below the knee to Right lower leg(s)  (4 Layer Compression Wrap ) . Instructed patient/caregiver not to remove dressing and to keep it clean and dry. Instructed patient/caregiver on complications to report to provider, such as pain, numbness in toes, heavy drainage, and slippage of dressing. Instructed patient on purpose of compression dressing and on activity and exercise recommendations.    Applied per   Guidelines    Electronically signed by Narciso Paulson RN on 1/12/2023 at 9:30 AM

## 2023-01-16 ENCOUNTER — HOSPITAL ENCOUNTER (OUTPATIENT)
Dept: WOUND CARE | Age: 58
Discharge: HOME | End: 2023-01-16
Payer: COMMERCIAL

## 2023-01-16 VITALS
HEART RATE: 76 BPM | SYSTOLIC BLOOD PRESSURE: 149 MMHG | DIASTOLIC BLOOD PRESSURE: 79 MMHG | RESPIRATION RATE: 16 BRPM | TEMPERATURE: 97.6 F

## 2023-01-16 DIAGNOSIS — I87.311 IDIOPATHIC CHRONIC VENOUS HYPERTENSION OF RIGHT LOWER EXTREMITY WITH ULCER (HCC): ICD-10-CM

## 2023-01-16 DIAGNOSIS — I89.0 CHRONIC ACQUIRED LYMPHEDEMA: Primary | ICD-10-CM

## 2023-01-16 DIAGNOSIS — Z91.199 NONCOMPLIANCE: ICD-10-CM

## 2023-01-16 DIAGNOSIS — L97.919 IDIOPATHIC CHRONIC VENOUS HYPERTENSION OF RIGHT LOWER EXTREMITY WITH ULCER (HCC): ICD-10-CM

## 2023-01-16 PROCEDURE — 6370000000 HC RX 637 (ALT 250 FOR IP): Performed by: SPECIALIST

## 2023-01-16 PROCEDURE — 11042 DBRDMT SUBQ TIS 1ST 20SQCM/<: CPT | Performed by: SPECIALIST

## 2023-01-16 PROCEDURE — 29581 APPL MULTLAYER CMPRN SYS LEG: CPT

## 2023-01-16 PROCEDURE — 11045 DBRDMT SUBQ TISS EACH ADDL: CPT

## 2023-01-16 PROCEDURE — 11045 DBRDMT SUBQ TISS EACH ADDL: CPT | Performed by: SPECIALIST

## 2023-01-16 PROCEDURE — 11042 DBRDMT SUBQ TIS 1ST 20SQCM/<: CPT

## 2023-01-16 RX ORDER — BACITRACIN ZINC AND POLYMYXIN B SULFATE 500; 1000 [USP'U]/G; [USP'U]/G
OINTMENT TOPICAL ONCE
OUTPATIENT
Start: 2023-01-16 | End: 2023-01-16

## 2023-01-16 RX ORDER — GENTAMICIN SULFATE 1 MG/G
OINTMENT TOPICAL ONCE
OUTPATIENT
Start: 2023-01-16 | End: 2023-01-16

## 2023-01-16 RX ORDER — LIDOCAINE HYDROCHLORIDE 20 MG/ML
JELLY TOPICAL ONCE
OUTPATIENT
Start: 2023-01-16 | End: 2023-01-16

## 2023-01-16 RX ORDER — LIDOCAINE HYDROCHLORIDE 40 MG/ML
SOLUTION TOPICAL ONCE
Status: COMPLETED | OUTPATIENT
Start: 2023-01-16 | End: 2023-01-16

## 2023-01-16 RX ORDER — GINSENG 100 MG
CAPSULE ORAL ONCE
OUTPATIENT
Start: 2023-01-16 | End: 2023-01-16

## 2023-01-16 RX ORDER — LIDOCAINE HYDROCHLORIDE 40 MG/ML
SOLUTION TOPICAL ONCE
OUTPATIENT
Start: 2023-01-16 | End: 2023-01-16

## 2023-01-16 RX ORDER — LIDOCAINE 50 MG/G
OINTMENT TOPICAL ONCE
OUTPATIENT
Start: 2023-01-16 | End: 2023-01-16

## 2023-01-16 RX ORDER — BACITRACIN, NEOMYCIN, POLYMYXIN B 400; 3.5; 5 [USP'U]/G; MG/G; [USP'U]/G
OINTMENT TOPICAL ONCE
OUTPATIENT
Start: 2023-01-16 | End: 2023-01-16

## 2023-01-16 RX ORDER — CLOBETASOL PROPIONATE 0.5 MG/G
OINTMENT TOPICAL ONCE
OUTPATIENT
Start: 2023-01-16 | End: 2023-01-16

## 2023-01-16 RX ORDER — BETAMETHASONE DIPROPIONATE 0.05 %
OINTMENT (GRAM) TOPICAL ONCE
OUTPATIENT
Start: 2023-01-16 | End: 2023-01-16

## 2023-01-16 RX ORDER — LIDOCAINE 40 MG/G
CREAM TOPICAL ONCE
OUTPATIENT
Start: 2023-01-16 | End: 2023-01-16

## 2023-01-16 RX ADMIN — LIDOCAINE HYDROCHLORIDE: 40 SOLUTION TOPICAL at 10:12

## 2023-01-16 ASSESSMENT — PAIN DESCRIPTION - LOCATION: LOCATION: LEG

## 2023-01-16 ASSESSMENT — PAIN DESCRIPTION - ORIENTATION: ORIENTATION: RIGHT

## 2023-01-16 ASSESSMENT — PAIN DESCRIPTION - FREQUENCY: FREQUENCY: CONTINUOUS

## 2023-01-16 ASSESSMENT — PAIN DESCRIPTION - PAIN TYPE: TYPE: CHRONIC PAIN

## 2023-01-16 ASSESSMENT — PAIN DESCRIPTION - DESCRIPTORS: DESCRIPTORS: ACHING

## 2023-01-16 ASSESSMENT — PAIN SCALES - GENERAL: PAINLEVEL_OUTOF10: 4

## 2023-01-16 NOTE — DISCHARGE INSTRUCTIONS
[facility] Physician Orders and Discharge Instructions  [address] [address] [facility] 103  Telephone: [phone]  NAME:  Eugenia Atwood  YOB: 1965  MEDICAL RECORD NUMBER:  [id number]  DATE: 1/16/23     Return Appointment:  Return Appointment: With Veronique Cevallos MD  in  1 [facility])  [] Return Appointment for a Wound Assessment with the nurse on:     Future Appointments   Date Time Provider Eloina Zamarripa   2/13/2023  9:30 AM 6 Christine Guido Eloued: none  Medically necessary services: [] Skilled Nursing [] PT (Eval & Treat) [] OT (Eval & Treat) [] Social Work [] Dietician  [] Other:    Wound care instructions: If you smoke we ask that you refrain from smoking. Smoking inhibits wounds from healing. When taking antibiotics take the entire prescription as ordered. Do not stop taking until medication is all gone unless otherwise instructed. Exercise as tolerated. Keep weight off wounds and reposition every 2 hours if applicable. Do not get wounds wet in the bath or shower unless otherwise instructed by your physician. If your wound is on your foot or leg, you may purchase a cast bag. Please ask at the pharmacy. Wash hands with soap and water prior to and after every dressing change. [x]Wash wounds with: Vashe wash - Apply enough Vashe to soak a piece of gauze and place on wound bed for 5-10 minutes. No need to rinse after soaking. [x]Kiah wound Topical Treatments: Do not apply lotions, creams, or ointments to the skin around the wound bed unless directed as followed:   Apply around the wound: Moisturizing lotion and Zinc paste         [x]Wound Location: right lower leg   Apply Primary Dressing to wound: Alginate with silver pad  Secondary Dressing: 4X4 gauze pad   Avoid contact of tape with skin if possible.   When to change Dressing: DO NOT CHANGE        [x] Multilayer Compression Wrap:  Type: Applied on Right lower leg(s)  4 Layer Compression Wrap    Do not get leg(s) with wrap wet. If wraps become too tight call the center or completely remove the wrap. Elevate leg(s) above the level of the heart when sitting. Avoid prolonged standing in one place. Applied in Clinic on 1/16/2023  The Goal of this therapy is to reduce edema and get into long term compression garments to control venous insufficiency, lymphedema and reduce occurrence of venous ulcers    [x] Edema Control:  Apply: Compression Stocking on the Left leg  Apply every morning immediately when getting up. Remove every night before going to bed unless instructed otherwise     Elevate leg(s) above the level of the heart for 30 minutes 4-5 times a day and/or when sitting. Avoid prolonged standing in one place. [x] Lymphedema Therapy:  Wear Lymphedema pumps twice a day  for 1 hour at settings prescribed by your physician. Avoid prolonged standing in one place. Dietary:  Important dietary reminders:  1. Increase Protein intake (i.e. Lean meats, fish, eggs, legumes, and yogurt)  2. No added salt  3. If diabetic, follow a diabetic diet and check glucose prior to meals or as instructed by your physician. Dietary Supplements(Take twice a day unless instructed otherwise):  [] Leonid Garcia  [] 30ml ProStat [] Juvenal Shea [] Ensure Max/Premier [] Other:    Your nurse  is:  Enrrique Moser     Electronically signed by Jj Larsen RN on 1/16/2023 at 10:29 AM     [facility] Information: Should you experience any significant changes in your wound(s) or have questions about your wound care, please contact the [address] [place] [address] at [phone]. We are open from 8:00am - 4:30p Monday, Thursday and Friday; 11:00am - 5pm on Tuesday and CLOSED Wednesday. Please give us 24-48 business hours to return your call.       Call your doctor now or seek immediate medical care if:    You have symptoms of infection, such as: Increased pain, swelling, warmth, or redness. Red streaks leading from the area. Pus draining from the area. A fever.          [] Patient unable to sign Discharge Instructions given to ECF/Transportation/POA

## 2023-01-16 NOTE — PROGRESS NOTES
Multilayer Compression Wrap   (Not Unna) Below the Knee    NAME:  Franchesca Gee  YOB: 1965  MEDICAL RECORD NUMBER:  [id number]  DATE:  1/16/2023       Removed old Multilayer wrap if present and washed leg with mild soap/water. Applied moisturizing agent to dry skin as needed. Applied primary and secondary dressing as ordered    Applied multilayered dressing below the knee to Right lower leg(s)  (4 Layer Compression Wrap ) . Instructed patient/caregiver not to remove dressing and to keep it clean and dry. Instructed patient/caregiver on complications to report to provider, such as pain, numbness in toes, heavy drainage, and slippage of dressing. Instructed patient on purpose of compression dressing and on activity and exercise recommendations.    Applied per   Guidelines    Electronically signed by Alondra Pennington RN on 1/16/2023 at 10:41 AM

## 2023-01-16 NOTE — PROGRESS NOTES
[facility] [place]  Progress Note and Procedure Note      Leslie Khan  MEDICAL RECORD NUMBER:  [id number]  AGE: 62 y.o. GENDER: male  : 1965  EPISODE DATE:  2023    Subjective:     Chief Complaint   Patient presents with    Wound Check     Right lower leg         HISTORY of PRESENT ILLNESS HPI     Leslie Khan is a 62 y.o. male who presents today for wound/ulcer evaluation. History of Wound Context: Patient continues follow-up for chronic venous insufficiency with ulceration and significant lymphedema right lower extremity. He is now 3 weeks post third TheraSkin allograft application to right lower extremity ulcers. His compression 4-layer wrap has slipped since last visit. Also states he has been on his feet more so than resulting increased swelling.   He claims to be utilizing his lymphedema pumps  Wound/Ulcer Pain Timing/Severity: none  Quality of pain: N/A  Severity:  0 / 10   Modifying Factors: None  Associated Signs/Symptoms: edema    Ulcer Identification:  Ulcer Type: venous    Contributing Factors: edema, venous stasis, lymphedema, obesity, and anticoagulation therapy    Acute Wound: N/A not an acute wound    PAST MEDICAL HISTORY        Diagnosis Date    DVT (deep venous thrombosis) (HCC)     Hx of blood clots     Pain and swelling of right lower leg        PAST SURGICAL HISTORY    Past Surgical History:   Procedure Laterality Date    BALLOON ANGIOPLASTY[place] ARTERY Right [place] [facility]      SKIN SPLIT GRAFT Right        FAMILY HISTORY    Family History   Problem Relation Age of Onset    Diabetes Mother     Heart Attack Father        SOCIAL HISTORY    Social History     Tobacco Use    Smoking status: Never    Smokeless tobacco: Never   Vaping Use    Vaping Use: Never used   Substance Use Topics    Alcohol use: No    Drug use: No       ALLERGIES    No Known Allergies    MEDICATIONS    Current Outpatient Medications on File Prior to Encounter   Medication Sig Dispense Refill    doxycycline hyclate (VIBRA-TABS) 100 MG tablet Take 1 tablet by mouth 2 times daily 60 tablet 0    ibuprofen (ADVIL;MOTRIN) 200 MG tablet Take 200 mg by mouth every 6 hours as needed for Pain      triamcinolone (KENALOG) 0.1 % ointment Apply topically 2 times daily 453.6 g 1    warfarin (COUMADIN) 5 MG tablet TAKE ONE AND ONE-HALF TABLETS BY MOUTH DAILY EXCEPT TAKE 2 TABLETS DAILY ON FRIDAY 180 tablet 1    Compression Stockings MISC by Does not apply route Strength 30-40mmHg  Style: Other pull up compression stockings  Extremity: bilateral  Donning aid recommended: No 1 each 0     No current facility-administered medications on file prior to encounter. REVIEW OF SYSTEMS  Review of Systems    Pertinent items are noted in HPI. Objective:      BP (!) 149/79   Pulse 76   Temp 97.6 °F (36.4 °C) (Temporal)   Resp 16     Wt Readings from Last 3 Encounters:   04/25/22 251 lb 6.4 oz (114 kg)   04/18/22 251 lb 6.4 oz (114 kg)   10/27/21 244 lb (110.7 kg)       PHYSICAL EXAM    General Appearance: alert and oriented to person, place and time and obese  Extremities: no cyanosis, no clubbing, 3 + edema-  right lower extremity, and 3 separate full-thickness ulcerations present on the right anterior, medial, lateral knee containing granulation tissue with fibrin. Moderate periwound maceration medial ulcer    Assessment:      1. Chronic acquired lymphedema    2. Idiopathic chronic venous hypertension of right lower extremity with ulcer ([address]    3. Noncompliance         Procedure Note  Indications:  Based on my examination of this patient's wound(s) today, sharp excision is required to promote healing and evaluate the extent healing. Performed by: Ana Nayak MD    Consent obtained?  Yes    Time out taken: Yes    Pain Control: Anesthetic: 4% Lidocaine Liquid Topical     Debridement:Excisional Debridement    Using curette the wound was sharply debrided    down through and including the removal of  epidermis, dermis, and subcutaneous tissue. Devitalized Tissue Debrided:  fibrin and biofilm      Pre Debridement Measurements:  Are located in the Wound Documentation Flow Sheet   Wound #: 1, 2, and 3     Post  Debridement Measurements:  Wound 07/25/22 Leg Right; Lower;Medial #1 (Active)   Wound Image   01/03/23 1118   Wound Etiology Venous 07/25/22 1040   Wound Cleansed Cleansed with saline 01/16/23 1001   Dressing/Treatment Alginate with Ag 01/16/23 1040   Wound Length (cm) 3.8 cm 01/16/23 1001   Wound Width (cm) 3.2 cm 01/16/23 1001   Wound Depth (cm) 0.1 cm 01/16/23 1001   Wound Surface Area (cm^2) 12.16 cm^2 01/16/23 1001   Change in Wound Size % (l*w) 48.58 01/16/23 1001   Wound Volume (cm^3) 1.216 cm^3 01/16/23 1001   Wound Healing % 49 01/16/23 1001   Post-Procedure Length (cm) 3.9 cm 01/16/23 1032   Post-Procedure Width (cm) 3.3 cm 01/16/23 1032   Post-Procedure Depth (cm) 0.3 cm 01/16/23 1032   Post-Procedure Surface Area (cm^2) 12.87 cm^2 01/16/23 1032   Post-Procedure Volume (cm^3) 3.861 cm^3 01/16/23 1032   Distance Tunneling (cm) 0 cm 01/16/23 1001   Tunneling Position ___ O'Clock 0 12/22/22 1520   Undermining Starts ___ O'Clock 0 12/22/22 1520   Undermining Ends___ O'Clock 0 12/22/22 1520   Undermining Maxium Distance (cm) 0 01/16/23 1001   Wound Assessment Fibrin;Graft;Pink/red 01/16/23 1001   Drainage Amount Moderate 01/16/23 1001   Drainage Description Brown;Yellow 01/16/23 1001   Odor Mild 01/16/23 1001   Kiah-wound Assessment Maceration;Erosion 01/16/23 1001   Margins Defined edges 01/16/23 1001   Wound Thickness Description not for Pressure Injury Full thickness 01/16/23 1001   Number of days: 175       Wound 07/25/22 Leg Right; Anterior; Lower #2 (Active)   Wound Image   01/03/23 1118   Wound Etiology Venous 07/25/22 1040   Wound Cleansed Cleansed with saline 01/16/23 1001   Dressing/Treatment Alginate with Ag 01/16/23 1040   Wound Length (cm) 2.2 cm 01/16/23 1001 Wound Width (cm) 5.6 cm 01/16/23 1001   Wound Depth (cm) 0.1 cm 01/16/23 1001   Wound Surface Area (cm^2) 12.32 cm^2 01/16/23 1001   Change in Wound Size % (l*w) 72.98 01/16/23 1001   Wound Volume (cm^3) 1.232 cm^3 01/16/23 1001   Wound Healing % 73 01/16/23 1001   Post-Procedure Length (cm) 2.3 cm 01/16/23 1032   Post-Procedure Width (cm) 5.7 cm 01/16/23 1032   Post-Procedure Depth (cm) 0.3 cm 01/16/23 1032   Post-Procedure Surface Area (cm^2) 13.11 cm^2 01/16/23 1032   Post-Procedure Volume (cm^3) 3.933 cm^3 01/16/23 1032   Distance Tunneling (cm) 0 cm 12/29/22 0845   Tunneling Position ___ O'Clock 0 12/22/22 1520   Undermining Starts ___ O'Clock 0 12/22/22 1520   Undermining Ends___ O'Clock 0 12/22/22 1520   Undermining Maxium Distance (cm) 0 12/29/22 0845   Wound Assessment Fibrin;Pink/red 01/16/23 1001   Drainage Amount Moderate 01/16/23 1001   Drainage Description Brown;Yellow 01/16/23 1001   Odor Mild 01/16/23 1001   Kiah-wound Assessment Maceration 01/16/23 1001   Margins Defined edges 01/16/23 1001   Wound Thickness Description not for Pressure Injury Full thickness 01/16/23 1001   Number of days: 175       Wound 07/25/22 Leg Right;Posterior; Lower #3 (Active)   Wound Image   01/03/23 1118   Wound Etiology Venous 07/25/22 1040   Wound Cleansed Cleansed with saline 01/16/23 1001   Dressing/Treatment Alginate with Ag 01/16/23 1040   Wound Length (cm) 3 cm 01/16/23 1001   Wound Width (cm) 2 cm 01/16/23 1001   Wound Depth (cm) 0.1 cm 01/16/23 1001   Wound Surface Area (cm^2) 6 cm^2 01/16/23 1001   Change in Wound Size % (l*w) 86.36 01/16/23 1001   Wound Volume (cm^3) 0.6 cm^3 01/16/23 1001   Wound Healing % 86 01/16/23 1001   Post-Procedure Length (cm) 3.1 cm 01/16/23 1032   Post-Procedure Width (cm) 2.1 cm 01/16/23 1032   Post-Procedure Depth (cm) 0.3 cm 01/16/23 1032   Post-Procedure Surface Area (cm^2) 6.51 cm^2 01/16/23 1032   Post-Procedure Volume (cm^3) 1. 953 cm^3 01/16/23 1032   Distance Tunneling (cm) 0 cm 01/16/23 1001   Tunneling Position ___ O'Clock 0 12/22/22 1520   Undermining Starts ___ O'Clock 0 12/22/22 1520   Undermining Ends___ O'Clock 0 12/22/22 1520   Undermining Maxium Distance (cm) 0 01/16/23 1001   Wound Assessment Fibrin;Pink/red 01/16/23 1001   Drainage Amount Moderate 01/16/23 1001   Drainage Description Serosanguinous;Brown 01/16/23 1001   Odor Mild 01/16/23 1001   Kiah-wound Assessment Erosion;Maceration 01/16/23 1001   Margins Undefined edges 01/16/23 1001   Wound Thickness Description not for Pressure Injury Full thickness 01/16/23 1001   Number of days: 175          Percent of Wound Debrided: 100%    Total Surface Area Debrided:  32 sq cm    Diabetic/Pressure/Non Pressure Ulcers only:  Ulcer: Non-Pressure ulcer, fat layer exposed    Bleeding: Minimal    Hemostasis Achieved: by pressure    Procedural Pain: 0  / 10     Post Procedural Pain: 0 / 10     Response to treatment:  Well tolerated by patient.         Plan:     Treatment Note: Please see attached Discharge Instructions.  These instructions were given and signed by the patient or POA    New Prescriptions    No medications on file       Orders Placed This Encounter   Procedures    Initiate Outpatient Wound Care Protocol       Discharge Instructions          Wound Care Center Physician Orders and Discharge Instructions  [facility]   [facility] [address]  Telephone: [phone] FAX [phone]  NAME:  Justin Baeza  YOB: 1965  MEDICAL RECORD NUMBER:  [id number]  DATE: 1/16/23     Return Appointment:  Return Appointment: With Khang Giles MD  in  1 Week(s)  [] Return Appointment for a Wound Assessment with the nurse on:     Future Appointments   Date Time Provider Department Center   2/13/2023  9:30 AM [facility]NA[facility] none  Medically necessary services: [] Skilled Nursing [] PT (Eval & Treat) [] OT (Eval & Treat) []  Social Work [] Dietician  [] Other:    Wound care instructions: If you smoke we ask that you refrain from smoking. Smoking inhibits wounds from healing. When taking antibiotics take the entire prescription as ordered. Do not stop taking until medication is all gone unless otherwise instructed. Exercise as tolerated. Keep weight off wounds and reposition every 2 hours if applicable. Do not get wounds wet in the bath or shower unless otherwise instructed by your physician. If your wound is on your foot or leg, you may purchase a cast bag. Please ask at the pharmacy. Wash hands with soap and water prior to and after every dressing change. [x]Wash wounds with: Vashe wash - Apply enough Vashe to soak a piece of gauze and place on wound bed for 5-10 minutes. No need to rinse after soaking. [x]Kiah wound Topical Treatments: Do not apply lotions, creams, or ointments to the skin around the wound bed unless directed as followed:   Apply around the wound: Moisturizing lotion and Zinc paste         [x]Wound Location: right lower leg   Apply Primary Dressing to wound: Alginate with silver pad  Secondary Dressing: 4X4 gauze pad   Avoid contact of tape with skin if possible. When to change Dressing: DO NOT CHANGE        [x] Multilayer Compression Wrap:  Type: Applied on Right lower leg(s)  4 Layer Compression Wrap    Do not get leg(s) with wrap wet. If wraps become too tight call the center or completely remove the wrap. Elevate leg(s) above the level of the heart when sitting. Avoid prolonged standing in one place. Applied in Clinic on 1/16/2023  The Goal of this therapy is to reduce edema and get into long term compression garments to control venous insufficiency, lymphedema and reduce occurrence of venous ulcers    [x] Edema Control:  Apply: Compression Stocking on the Left leg  Apply every morning immediately when getting up.  Remove every night before going to bed unless instructed otherwise     Elevate leg(s) above the level of the heart for 30 minutes 4-5 times a day and/or when sitting. Avoid prolonged standing in one place. [x] Lymphedema Therapy:  Wear Lymphedema pumps twice a day  for 1 hour at settings prescribed by your physician. Avoid prolonged standing in one place. Dietary:  Important dietary reminders:  1. Increase Protein intake (i.e. Lean meats, fish, eggs, legumes, and yogurt)  2. No added salt  3. If diabetic, follow a diabetic diet and check glucose prior to meals or as instructed by your physician. Dietary Supplements(Take twice a day unless instructed otherwise):  [] Aracely Saeed  [] 30ml ProStat [] [employer] [] Ensure Max/Premier [] Other:    Your nurse  is:  Yaniv Tapia     Electronically signed by Loretta Lemus RN on 1/16/2023 at 10:29 AM     [facility] Information: Should you experience any significant changes in your wound(s) or have questions about your wound care, please contact the [address] [place] [address] at [phone]. We are open from 8:00am - 4:30p Monday, Thursday and Friday; 11:00am - 5pm on Tuesday and CLOSED Wednesday. Please give us 24-48 business hours to return your call. Call your doctor now or seek immediate medical care if:    You have symptoms of infection, such as: Increased pain, swelling, warmth, or redness. Red streaks leading from the area. Pus draining from the area. A fever.          [] Patient unable to sign Discharge Instructions given to ECF/Transportation/POA         Electronically signed by Abril Wall MD on 1/16/2023 at 11:49 AM

## 2023-01-26 ENCOUNTER — HOSPITAL ENCOUNTER (OUTPATIENT)
Dept: WOUND CARE | Age: 58
Discharge: HOME | End: 2023-01-26
Payer: COMMERCIAL

## 2023-01-26 VITALS
HEART RATE: 97 BPM | RESPIRATION RATE: 16 BRPM | TEMPERATURE: 97 F | SYSTOLIC BLOOD PRESSURE: 154 MMHG | DIASTOLIC BLOOD PRESSURE: 87 MMHG

## 2023-01-26 DIAGNOSIS — I87.311 IDIOPATHIC CHRONIC VENOUS HYPERTENSION OF RIGHT LOWER EXTREMITY WITH ULCER (HCC): ICD-10-CM

## 2023-01-26 DIAGNOSIS — Z91.199 NONCOMPLIANCE: ICD-10-CM

## 2023-01-26 DIAGNOSIS — I89.0 CHRONIC ACQUIRED LYMPHEDEMA: Primary | ICD-10-CM

## 2023-01-26 DIAGNOSIS — L97.919 IDIOPATHIC CHRONIC VENOUS HYPERTENSION OF RIGHT LOWER EXTREMITY WITH ULCER (HCC): ICD-10-CM

## 2023-01-26 PROCEDURE — 29581 APPL MULTLAYER CMPRN SYS LEG: CPT

## 2023-01-26 PROCEDURE — 11045 DBRDMT SUBQ TISS EACH ADDL: CPT

## 2023-01-26 PROCEDURE — 6370000000 HC RX 637 (ALT 250 FOR IP): Performed by: SPECIALIST

## 2023-01-26 PROCEDURE — 11042 DBRDMT SUBQ TIS 1ST 20SQCM/<: CPT | Performed by: SPECIALIST

## 2023-01-26 PROCEDURE — 11042 DBRDMT SUBQ TIS 1ST 20SQCM/<: CPT

## 2023-01-26 PROCEDURE — 11045 DBRDMT SUBQ TISS EACH ADDL: CPT | Performed by: SPECIALIST

## 2023-01-26 RX ORDER — BACITRACIN, NEOMYCIN, POLYMYXIN B 400; 3.5; 5 [USP'U]/G; MG/G; [USP'U]/G
OINTMENT TOPICAL ONCE
OUTPATIENT
Start: 2023-01-26 | End: 2023-01-26

## 2023-01-26 RX ORDER — CLOBETASOL PROPIONATE 0.5 MG/G
OINTMENT TOPICAL ONCE
OUTPATIENT
Start: 2023-01-26 | End: 2023-01-26

## 2023-01-26 RX ORDER — LIDOCAINE HYDROCHLORIDE 20 MG/ML
JELLY TOPICAL ONCE
OUTPATIENT
Start: 2023-01-26 | End: 2023-01-26

## 2023-01-26 RX ORDER — LIDOCAINE 40 MG/G
CREAM TOPICAL ONCE
OUTPATIENT
Start: 2023-01-26 | End: 2023-01-26

## 2023-01-26 RX ORDER — BETAMETHASONE DIPROPIONATE 0.05 %
OINTMENT (GRAM) TOPICAL ONCE
OUTPATIENT
Start: 2023-01-26 | End: 2023-01-26

## 2023-01-26 RX ORDER — LIDOCAINE HYDROCHLORIDE 40 MG/ML
SOLUTION TOPICAL ONCE
OUTPATIENT
Start: 2023-01-26 | End: 2023-01-26

## 2023-01-26 RX ORDER — GINSENG 100 MG
CAPSULE ORAL ONCE
OUTPATIENT
Start: 2023-01-26 | End: 2023-01-26

## 2023-01-26 RX ORDER — GENTAMICIN SULFATE 1 MG/G
OINTMENT TOPICAL ONCE
OUTPATIENT
Start: 2023-01-26 | End: 2023-01-26

## 2023-01-26 RX ORDER — LIDOCAINE HYDROCHLORIDE 40 MG/ML
SOLUTION TOPICAL ONCE
Status: COMPLETED | OUTPATIENT
Start: 2023-01-26 | End: 2023-01-26

## 2023-01-26 RX ORDER — LIDOCAINE 50 MG/G
OINTMENT TOPICAL ONCE
OUTPATIENT
Start: 2023-01-26 | End: 2023-01-26

## 2023-01-26 RX ORDER — BACITRACIN ZINC AND POLYMYXIN B SULFATE 500; 1000 [USP'U]/G; [USP'U]/G
OINTMENT TOPICAL ONCE
OUTPATIENT
Start: 2023-01-26 | End: 2023-01-26

## 2023-01-26 RX ADMIN — LIDOCAINE HYDROCHLORIDE: 40 SOLUTION TOPICAL at 08:45

## 2023-01-26 ASSESSMENT — PAIN DESCRIPTION - FREQUENCY: FREQUENCY: CONTINUOUS

## 2023-01-26 ASSESSMENT — PAIN DESCRIPTION - LOCATION: LOCATION: LEG

## 2023-01-26 ASSESSMENT — PAIN DESCRIPTION - ORIENTATION: ORIENTATION: RIGHT

## 2023-01-26 ASSESSMENT — PAIN DESCRIPTION - DESCRIPTORS: DESCRIPTORS: ACHING

## 2023-01-26 ASSESSMENT — PAIN DESCRIPTION - PAIN TYPE: TYPE: CHRONIC PAIN

## 2023-01-26 ASSESSMENT — PAIN SCALES - GENERAL: PAINLEVEL_OUTOF10: 6

## 2023-01-26 NOTE — DISCHARGE INSTRUCTIONS
[address] [facility] Physician Orders and Discharge Instructions  [address] [address] [facility]. [facility] 103  Telephone: [phone]  NAME:  Roseanne Manning  YOB: 1965  MEDICAL RECORD NUMBER:  [id number]  DATE: 1/26/23     Return Appointment:  Return Appointment: With Paloa Ellington MD  in  1 [facility])  [] Return Appointment for a Wound Assessment with the nurse on:     Future Appointments   Date Time Provider Eloina Zamarripa   2/13/2023  9:30 AM [address] El[place]: none  Medically necessary services: [] Skilled Nursing [] PT (Eval & Treat) [] OT (Eval & Treat) [] Social Work [] Dietician  [] Other:    Wound care instructions: If you smoke we ask that you refrain from smoking. Smoking inhibits wounds from healing. When taking antibiotics take the entire prescription as ordered. Do not stop taking until medication is all gone unless otherwise instructed. Exercise as tolerated. Keep weight off wounds and reposition every 2 hours if applicable. Do not get wounds wet in the bath or shower unless otherwise instructed by your physician. If your wound is on your foot or leg, you may purchase a cast bag. Please ask at the pharmacy. Wash hands with soap and water prior to and after every dressing change. [x]Wash wounds with: Vashe wash - Apply enough Vashe to soak a piece of gauze and place on wound bed for 5-10 minutes. No need to rinse after soaking. [x]Kiah wound Topical Treatments: Do not apply lotions, creams, or ointments to the skin around the wound bed unless directed as followed:   Apply around the wound: Zinc paste         [x]Wound Location: right lower leg   Apply Primary Dressing to wound:  poly mem silver  Secondary Dressing: 4X4 gauze pad   Avoid contact of tape with skin if possible.   When to change Dressing: DO NOT CHANGE      [x]DME/Wound Dressing Supplies ordered from:[facility] [employer] [phone]  - compression stockings ordered  Please note, depending on your insurance coverage, you may have out-of-pocket expenses for these supplies. If your out-of-pocket cost could be substantial, many companies have financial hardship programs for patients who qualify. If you have any questions about your supplies or your potential out-of-pocket costs, or if you need to place an order for a refill of supplies (typically monthly), please call the company directly. [x] Multilayer Compression Wrap:  Type: Applied on Right lower leg(s)  4 Layer Compression Wrap    Do not get leg(s) with wrap wet. If wraps become too tight call the center or completely remove the wrap. Elevate leg(s) above the level of the heart when sitting. Avoid prolonged standing in one place. Applied in Clinic on 1/26/2023  The Goal of this therapy is to reduce edema and get into long term compression garments to control venous insufficiency, lymphedema and reduce occurrence of venous ulcers    [x] Edema Control: 30-40mmHG  Apply: Compression Stocking on the Left leg  Apply every morning immediately when getting up. Remove every night before going to bed unless instructed otherwise     Elevate leg(s) above the level of the heart for 30 minutes 4-5 times a day and/or when sitting. Avoid prolonged standing in one place. [x] Lymphedema Therapy:  Wear Lymphedema pumps twice a day at settings prescribed by your physician. Avoid prolonged standing in one place. Dietary:  Important dietary reminders:  1. Increase Protein intake (i.e. Lean meats, fish, eggs, legumes, and yogurt)  2. No added salt  3. If diabetic, follow a diabetic diet and check glucose prior to meals or as instructed by your physician.     Dietary Supplements(Take twice a day unless instructed otherwise):  [] Jacquelene Reins  [] 30ml ProStat [] Edgar Gallardo [] Ensure Max/Premier [] Other:    Your nurse  is:  Michael Rendon     Electronically signed by Merlinda Clay, RN on 1/26/2023 at 9:23 AM     [facility] [address]: Should you experience any significant changes in your wound(s) or have questions about your wound care, please contact the [address] [place] [address] at [phone]. We are open from 8:00am - 4:30p Monday, Thursday and Friday; 11:00am - 5pm on Tuesday and CLOSED Wednesday. Please give us 24-48 business hours to return your call. Call your doctor now or seek immediate medical care if:    You have symptoms of infection, such as: Increased pain, swelling, warmth, or redness. Red streaks leading from the area. Pus draining from the area. A fever.          [] Patient unable to sign Discharge Instructions given to ECF/Transportation/POA

## 2023-01-26 NOTE — PLAN OF CARE
Compression [facility] for [employer]:     [place] [address] f: [phone] f: [phone] p: [phone] [email or web address]      Ordering Center: [facility] [address] [address] [facility]. [address] NUMBER: [phone]    Patient Information:      Kaycee Garcia [id number]   [phone]   : 1965  AGE: 62 y.o. GENDER: male   TODAYS DATE:  2023    Insurance:      PRIMARY INSURANCE:  Plan: [employer]  Coverage: [employer]  Effective Date: 2022  Group Number: [unfilled]  Subscriber Number: [id number] - (Commercial)    SECONDARY INSURANCE:  Plan:   Coverage:   Effective Date:   [unfilled]    [unfilled]     [unfilled]   [unfilled]     Patient Information:      Problem List Items Addressed This Visit          Circulatory    Idiopathic chronic venous hypertension of right lower extremity with ulcer ([address]    Relevant Orders    Initiate Outpatient Wound Care Protocol       Other    Chronic acquired lymphedema - Primary    Relevant Orders    Initiate Outpatient Wound Care Protocol    Noncompliance    Relevant Orders    Initiate Outpatient Wound Care Protocol       Wound 22 Leg Right; Lower;Medial #1 (Active)   Wound Image   23 1118   Wound Etiology Venous 22 1040   Wound Cleansed Cleansed with saline 23 0845   Dressing/Treatment Alginate with Ag 23 1040   Wound Length (cm) 2.7 cm 23 0845   Wound Width (cm) 3 cm 23 0845   Wound Depth (cm) 0.1 cm 23 0845   Wound Surface Area (cm^2) 8.1 cm^2 23 0845   Change in Wound Size % (l*w) 65.75 23 0845   Wound Volume (cm^3) 0.81 cm^3 23 0845   Wound Healing % 66 23 0845   Post-Procedure Length (cm) 2.8 cm 23 0917   Post-Procedure Width (cm) 3.1 cm 23 0917   Post-Procedure Depth (cm) 0.3 cm 01/26/23 0917   Post-Procedure Surface Area (cm^2) 8.68 cm^2 01/26/23 0917   Post-Procedure Volume (cm^3) 2. 604 cm^3 01/26/23 0917   Distance Tunneling (cm) 0 cm 01/16/23 1001   Tunneling Position ___ O'Clock 0 12/22/22 1520   Undermining Starts ___ O'Clock 0 12/22/22 1520   Undermining Ends___ O'Clock 0 12/22/22 1520   Undermining Maxium Distance (cm) 0 01/16/23 1001   Wound Assessment Fibrin;Slough;Pink/red 01/26/23 0845   Drainage Amount Moderate 01/26/23 0845   Drainage Description Purulent;Brown;Yellow 01/26/23 0845   Odor Mild 01/26/23 0845   Kiah-wound Assessment Maceration;Erosion 01/26/23 0845   Margins Defined edges 01/26/23 0845   Wound Thickness Description not for Pressure Injury Full thickness 01/26/23 0845   Number of days: 184       Wound 07/25/22 Leg Right; Anterior; Lower #2 (Active)   Wound Image   01/03/23 1118   Wound Etiology Venous 07/25/22 1040   Wound Cleansed Cleansed with saline 01/26/23 0845   Dressing/Treatment Alginate with Ag 01/16/23 1040   Wound Length (cm) 2.5 cm 01/26/23 0845   Wound Width (cm) 5.5 cm 01/26/23 0845   Wound Depth (cm) 0.1 cm 01/26/23 0845   Wound Surface Area (cm^2) 13.75 cm^2 01/26/23 0845   Change in Wound Size % (l*w) 69.85 01/26/23 0845   Wound Volume (cm^3) 1.375 cm^3 01/26/23 0845   Wound Healing % 70 01/26/23 0845   Post-Procedure Length (cm) 2.6 cm 01/26/23 0917   Post-Procedure Width (cm) 5.6 cm 01/26/23 0917   Post-Procedure Depth (cm) 0.3 cm 01/26/23 0917   Post-Procedure Surface Area (cm^2) 14.56 cm^2 01/26/23 0917   Post-Procedure Volume (cm^3) 4.368 cm^3 01/26/23 0917   Distance Tunneling (cm) 0 cm 12/29/22 0845   Tunneling Position ___ O'Clock 0 12/22/22 1520   Undermining Starts ___ O'Clock 0 12/22/22 1520   Undermining Ends___ O'Clock 0 12/22/22 1520   Undermining Maxium Distance (cm) 0 12/29/22 0845   Wound Assessment Fibrin;Slough;Pink/red 01/26/23 0845   Drainage Amount Moderate 01/26/23 0845   Drainage Description Purulent;Yellow;Brown 01/26/23 0845   Odor Mild 01/26/23 0845   Kiah-wound Assessment Maceration 01/26/23 0845   Margins Defined edges 01/26/23 0845   Wound Thickness Description not for Pressure Injury Full thickness 01/26/23 0845   Number of days: 184       Wound 07/25/22 Leg Right;Posterior; Lower #3 (Active)   Wound Image   01/03/23 1118   Wound Etiology Venous 07/25/22 1040   Wound Cleansed Cleansed with saline 01/26/23 0845   Dressing/Treatment Alginate with Ag 01/16/23 1040   Wound Length (cm) 3 cm 01/26/23 0845   Wound Width (cm) 1 cm 01/26/23 0845   Wound Depth (cm) 0.1 cm 01/26/23 0845   Wound Surface Area (cm^2) 3 cm^2 01/26/23 0845   Change in Wound Size % (l*w) 93.18 01/26/23 0845   Wound Volume (cm^3) 0.3 cm^3 01/26/23 0845   Wound Healing % 93 01/26/23 0845   Post-Procedure Length (cm) 3.1 cm 01/26/23 0917   Post-Procedure Width (cm) 1.1 cm 01/26/23 0917   Post-Procedure Depth (cm) 0.3 cm 01/26/23 0917   Post-Procedure Surface Area (cm^2) 3.41 cm^2 01/26/23 0917   Post-Procedure Volume (cm^3) 1.023 cm^3 01/26/23 0917   Distance Tunneling (cm) 0 cm 01/16/23 1001   Tunneling Position ___ O'Clock 0 12/22/22 1520   Undermining Starts ___ O'Clock 0 12/22/22 1520   Undermining Ends___ O'Clock 0 12/22/22 1520   Undermining Maxium Distance (cm) 0 01/16/23 1001   Wound Assessment Pink/red;Fibrin 01/26/23 0845   Drainage Amount Moderate 01/26/23 0845   Drainage Description Brown;Yellow;Purulent 01/26/23 0845   Odor Mild 01/26/23 0845   Kiah-wound Assessment Erosion; Maceration 01/26/23 0845   Margins Undefined edges 01/26/23 0845   Wound Thickness Description not for Pressure Injury Full thickness 01/26/23 0845   Number of days: 184       Right Leg Measurements: (ALL measurements are in cm)  Right Leg Edema Point of Measurement  Leg circumference: 58cm  Ankle circumference: 34 cm  Foot circumference: 30.5 cm  Length from heel to below knee: 40cm    Left Leg Measurements: (ALL measurements are in cm)  Left leg circumference: 56cm  Ankle Circumference: 31cm  Foot circumference: 27.5cm  Length from heel to below knee: 40cm    Left Leg Edema Point of Measurement  Compression Therapy: Compression ordered, Compression stockings  Stockings Compression Pressure: High (30-40mmHG)    Supplies Requested :     Medicare Requirements  Patient must have a qualifying Active Venus Ulcer if ordering Bilateral Compression Wounds MUST be present on both legs for Medicare Coverage. The patient can Not be on home health or have had a Medicare part A stay in the past 24 hours.     Patient Wound(s) Debrided: [x] Yes if yes please add date 01/26/2023   [] No    Debribement Type: Excisional/Sharp    Patient currently being seen by Home Health: [] Yes   [x] No     Compression Type: Compression stockings, 30-40 mm/Hg, Open toe knee-high,BILATERAL lower legs    Provider Information:      PROVIDER'S NAME/NPI: Woody Aden MD,  NPI: [id number]

## 2023-01-26 NOTE — PROGRESS NOTES
[facility]  Progress Note and Procedure Note      Krysten Beth  MEDICAL RECORD NUMBER:  [id number]  AGE: 62 y.o. GENDER: male  : 1965  EPISODE DATE:  2023    Subjective:     Chief Complaint   Patient presents with    Wound Check     Right lower leg         HISTORY of PRESENT ILLNESS HPI     Krysten Beth is a 62 y.o. male who presents today for wound/ulcer evaluation. History of Wound Context: Continues follow-up for his chronic venous insufficiency with ulceration and lymphedema right lower extremity. He has been maintained in a 4 layer compression wrap this past week without difficulty.   However it is been some 10 days ago since his last visit which the compression wrap was changed  Wound/Ulcer Pain Timing/Severity: none  Quality of pain: N/A  Severity:  0 / 10   Modifying Factors: None  Associated Signs/Symptoms: edema    Ulcer Identification:  Ulcer Type: venous    Contributing Factors: edema, venous stasis, lymphedema, obesity, and anticoagulation therapy    Acute Wound: N/A not an acute wound    PAST MEDICAL HISTORY        Diagnosis Date    DVT (deep venous thrombosis) (HCC)     Hx of blood clots     Pain and swelling of right lower leg        PAST SURGICAL HISTORY    Past Surgical History:   Procedure Laterality Date    BALLOON ANGIOPLASTY, ARTERY Right 2017    at [facility], ESOPHAGUS      SKIN SPLIT GRAFT Right        FAMILY HISTORY    Family History   Problem Relation Age of Onset    Diabetes Mother     Heart Attack Father        SOCIAL HISTORY    Social History     Tobacco Use    Smoking status: Never    Smokeless tobacco: Never   Vaping Use    Vaping Use: Never used   Substance Use Topics    Alcohol use: No    Drug use: No       ALLERGIES    No Known Allergies    MEDICATIONS    Current Outpatient Medications on File Prior to Encounter   Medication Sig Dispense Refill    doxycycline hyclate (VIBRA-TABS) 100 MG tablet Take 1 tablet by mouth 2 times daily 60 tablet 0    ibuprofen (ADVIL;MOTRIN) 200 MG tablet Take 200 mg by mouth every 6 hours as needed for Pain      triamcinolone (KENALOG) 0.1 % ointment Apply topically 2 times daily 453.6 g 1    warfarin (COUMADIN) 5 MG tablet TAKE ONE AND ONE-HALF TABLETS BY MOUTH DAILY EXCEPT TAKE 2 TABLETS DAILY ON FRIDAY 180 tablet 1    Compression Stockings MISC by Does not apply route Strength 30-40mmHg  Style: Other pull up compression stockings  Extremity: bilateral  Donning aid recommended: No 1 each 0     No current facility-administered medications on file prior to encounter. REVIEW OF SYSTEMS  Review of Systems    Pertinent items are noted in HPI. Objective:      BP (!) 154/87   Pulse 97   Temp 97 °F (36.1 °C) (Temporal)   Resp 16     Wt Readings from Last 3 Encounters:   04/25/22 251 lb 6.4 oz (114 kg)   04/18/22 251 lb 6.4 oz (114 kg)   10/27/21 244 lb (110.7 kg)       PHYSICAL EXAM    General Appearance: alert and oriented to person, place and time, well-developed and well-nourished, in no acute distress and obese  Extremities: no cyanosis, no clubbing, and 3+ nonpitting edema-  right lower extremity with 3 full-thickness ulceration containing fibrin, slough, granulation tissue with minimal epithelialization at the margins of wounds on right anterior right medial and right lateral knee moderate periwound maceration medial knee    Assessment:      1. Chronic acquired lymphedema    2. Idiopathic chronic venous hypertension of right lower extremity with ulcer ([facility] [address]    3. Noncompliance         Procedure Note  Indications:  Based on my examination of this patient's wound(s) today, sharp excision is required to promote healing and evaluate the extent healing. Performed by: Robert Oneal MD    Consent obtained?  Yes    Time out taken: Yes    Pain Control: Anesthetic: 4% Lidocaine Liquid Topical     Debridement:Excisional Debridement    Using curette the wound was sharply debrided    down through and including the removal of  epidermis, dermis, and subcutaneous tissue. Devitalized Tissue Debrided:  fibrin, biofilm, and slough      Pre Debridement Measurements:  Are located in the Wound Documentation Flow Sheet   Wound #: 1, 2, and 3     Post  Debridement Measurements:  Wound 07/25/22 Leg Right; Lower;Medial #1 (Active)   Wound Image   01/03/23 1118   Wound Etiology Venous 07/25/22 1040   Wound Cleansed Cleansed with saline 01/26/23 0845   Dressing/Treatment Foam impregnated with Ag 01/26/23 0926   Wound Length (cm) 2.7 cm 01/26/23 0845   Wound Width (cm) 3 cm 01/26/23 0845   Wound Depth (cm) 0.1 cm 01/26/23 0845   Wound Surface Area (cm^2) 8.1 cm^2 01/26/23 0845   Change in Wound Size % (l*w) 65.75 01/26/23 0845   Wound Volume (cm^3) 0.81 cm^3 01/26/23 0845   Wound Healing % 66 01/26/23 0845   Post-Procedure Length (cm) 2.8 cm 01/26/23 0917   Post-Procedure Width (cm) 3.1 cm 01/26/23 0917   Post-Procedure Depth (cm) 0.3 cm 01/26/23 0917   Post-Procedure Surface Area (cm^2) 8.68 cm^2 01/26/23 0917   Post-Procedure Volume (cm^3) 2. 604 cm^3 01/26/23 0917   Distance Tunneling (cm) 0 cm 01/16/23 1001   Tunneling Position ___ O'Clock 0 12/22/22 1520   Undermining Starts ___ O'Clock 0 12/22/22 1520   Undermining Ends___ O'Clock 0 12/22/22 1520   Undermining Maxium Distance (cm) 0 01/16/23 1001   Wound Assessment Fibrin;Slough;Pink/red 01/26/23 0845   Drainage Amount Moderate 01/26/23 0845   Drainage Description Purulent;Brown;Yellow 01/26/23 0845   Odor Mild 01/26/23 0845   Kiah-wound Assessment Maceration;Erosion 01/26/23 0845   Margins Defined edges 01/26/23 0845   Wound Thickness Description not for Pressure Injury Full thickness 01/26/23 0845   Number of days: 185       Wound 07/25/22 Leg Right; Anterior; Lower #2 (Active)   Wound Image   01/03/23 1118   Wound Etiology Venous 07/25/22 1040   Wound Cleansed Cleansed with saline 01/26/23 0845   Dressing/Treatment Foam impregnated with Ag 01/26/23 4862 Wound Length (cm) 2.5 cm 01/26/23 0845   Wound Width (cm) 5.5 cm 01/26/23 0845   Wound Depth (cm) 0.1 cm 01/26/23 0845   Wound Surface Area (cm^2) 13.75 cm^2 01/26/23 0845   Change in Wound Size % (l*w) 69.85 01/26/23 0845   Wound Volume (cm^3) 1.375 cm^3 01/26/23 0845   Wound Healing % 70 01/26/23 0845   Post-Procedure Length (cm) 2.6 cm 01/26/23 0917   Post-Procedure Width (cm) 5.6 cm 01/26/23 0917   Post-Procedure Depth (cm) 0.3 cm 01/26/23 0917   Post-Procedure Surface Area (cm^2) 14.56 cm^2 01/26/23 0917   Post-Procedure Volume (cm^3) 4.368 cm^3 01/26/23 0917   Distance Tunneling (cm) 0 cm 12/29/22 0845   Tunneling Position ___ O'Clock 0 12/22/22 1520   Undermining Starts ___ O'Clock 0 12/22/22 1520   Undermining Ends___ O'Clock 0 12/22/22 1520   Undermining Maxium Distance (cm) 0 12/29/22 0845   Wound Assessment Fibrin;Slough;Pink/red 01/26/23 0845   Drainage Amount Moderate 01/26/23 0845   Drainage Description Purulent;Yellow;Brown 01/26/23 0845   Odor Mild 01/26/23 0845   Kiah-wound Assessment Maceration 01/26/23 0845   Margins Defined edges 01/26/23 0845   Wound Thickness Description not for Pressure Injury Full thickness 01/26/23 0845   Number of days: 185       Wound 07/25/22 Leg Right;Posterior; Lower #3 (Active)   Wound Image   01/03/23 1118   Wound Etiology Venous 07/25/22 1040   Wound Cleansed Cleansed with saline 01/26/23 0845   Dressing/Treatment Foam impregnated with Ag 01/26/23 0926   Wound Length (cm) 3 cm 01/26/23 0845   Wound Width (cm) 1 cm 01/26/23 0845   Wound Depth (cm) 0.1 cm 01/26/23 0845   Wound Surface Area (cm^2) 3 cm^2 01/26/23 0845   Change in Wound Size % (l*w) 93.18 01/26/23 0845   Wound Volume (cm^3) 0.3 cm^3 01/26/23 0845   Wound Healing % 93 01/26/23 0845   Post-Procedure Length (cm) 3.1 cm 01/26/23 0917   Post-Procedure Width (cm) 1.1 cm 01/26/23 0917   Post-Procedure Depth (cm) 0.3 cm 01/26/23 0917   Post-Procedure Surface Area (cm^2) 3.41 cm^2 01/26/23 0917   Post-Procedure Volume (cm^3) 1.023 cm^3 01/26/23 0917   Distance Tunneling (cm) 0 cm 01/16/23 1001   Tunneling Position ___ O'Clock 0 12/22/22 1520   Undermining Starts ___ O'Clock 0 12/22/22 1520   Undermining Ends___ O'Clock 0 12/22/22 1520   Undermining Maxium Distance (cm) 0 01/16/23 1001   Wound Assessment Pink/red;Fibrin 01/26/23 0845   Drainage Amount Moderate 01/26/23 0845   Drainage Description Brown;Yellow;Purulent 01/26/23 0845   Odor Mild 01/26/23 0845   Kiah-wound Assessment Erosion; Maceration 01/26/23 0845   Margins Undefined edges 01/26/23 0845   Wound Thickness Description not for Pressure Injury Full thickness 01/26/23 0845   Number of days: 185          Percent of Wound Debrided: 100%    Total Surface Area Debrided:  26 sq cm    Diabetic/Pressure/Non Pressure Ulcers only:  Ulcer: Non-Pressure ulcer, fat layer exposed    Bleeding: Minimal    Hemostasis Achieved: by pressure    Procedural Pain: 0  / 10     Post Procedural Pain: 0 / 10     Response to treatment:  Well tolerated by patient. Small improvement in wound measurements. Again reiterated to patient to try and elevate the right lower extremity as he is on his feet for prolonged periods of time associated with his work. Also reinforced utilization of lymphedema pumps as much as possible        Plan:     Treatment Note: Please see attached Discharge Instructions. These instructions were given and signed by the patient or POA    New Prescriptions    No medications on file       Orders Placed This Encounter   Procedures    Initiate Outpatient Wound Care Protocol       Discharge Instructions          [facility] Physician Orders and Discharge Instructions  [address] [place] [address]. [address] [place] [address]  Telephone: [phone]  NAME:  Vidal Vela  YOB: 1965  MEDICAL RECORD NUMBER:  [id number]  DATE: 1/26/23     Return Appointment:  Return Appointment: With Nena Matias MD  in  1 Week(s)  [] Return Appointment for a Wound Assessment with the nurse on:     Future Appointments   Date Time Provider Eloina Zamarripa   2/13/2023  9:30 AM 6 Rujesus Guido Levon: none  Medically necessary services: [] Skilled Nursing [] PT (Eval & Treat) [] OT (Eval & Treat) [] Social Work [] Dietician  [] Other:    Wound care instructions: If you smoke we ask that you refrain from smoking. Smoking inhibits wounds from healing. When taking antibiotics take the entire prescription as ordered. Do not stop taking until medication is all gone unless otherwise instructed. Exercise as tolerated. Keep weight off wounds and reposition every 2 hours if applicable. Do not get wounds wet in the bath or shower unless otherwise instructed by your physician. If your wound is on your foot or leg, you may purchase a cast bag. Please ask at the pharmacy. Wash hands with soap and water prior to and after every dressing change. [x]Wash wounds with: Vashe wash - Apply enough Vashe to soak a piece of gauze and place on wound bed for 5-10 minutes. No need to rinse after soaking. [x]Kiah wound Topical Treatments: Do not apply lotions, creams, or ointments to the skin around the wound bed unless directed as followed:   Apply around the wound: Zinc paste         [x]Wound Location: right lower leg   Apply Primary Dressing to wound:  poly mem silver  Secondary Dressing: 4X4 gauze pad   Avoid contact of tape with skin if possible. When to change Dressing: DO NOT CHANGE      [x]DME/Wound Dressing Supplies ordered from:[facility]omas [facility] [phone]  - compression stockings ordered  Please note, depending on your insurance coverage, you may have out-of-pocket expenses for these supplies. If your out-of-pocket cost could be substantial, many companies have financial hardship programs for patients who qualify.  If you have any questions about your supplies or your potential out-of-pocket costs, or if you need to place an order for a refill of supplies (typically monthly), please call the company directly. [x] Multilayer Compression Wrap:  Type: Applied on Right lower leg(s)  4 Layer Compression Wrap    Do not get leg(s) with wrap wet. If wraps become too tight call the center or completely remove the wrap. Elevate leg(s) above the level of the heart when sitting. Avoid prolonged standing in one place. Applied in Clinic on 1/26/2023  The Goal of this therapy is to reduce edema and get into long term compression garments to control venous insufficiency, lymphedema and reduce occurrence of venous ulcers    [x] Edema Control: 30-40mmHG  Apply: Compression Stocking on the Left leg  Apply every morning immediately when getting up. Remove every night before going to bed unless instructed otherwise     Elevate leg(s) above the level of the heart for 30 minutes 4-5 times a day and/or when sitting. Avoid prolonged standing in one place. [x] Lymphedema Therapy:  Wear Lymphedema pumps twice a day at settings prescribed by your physician. Avoid prolonged standing in one place. Dietary:  Important dietary reminders:  1. Increase Protein intake (i.e. Lean meats, fish, eggs, legumes, and yogurt)  2. No added salt  3. If diabetic, follow a diabetic diet and check glucose prior to meals or as instructed by your physician. Dietary Supplements(Take twice a day unless instructed otherwise):  [] Tia Selwyn  [] 30ml ProStat [] Doneta More [] Ensure Max/Premier [] Other:    Your nurse  is:  Christina Jordan     Electronically signed by Alli Agrawal RN on 1/26/2023 at 9:23 AM     [facility] Information: Should you experience any significant changes in your wound(s) or have questions about your wound care, please contact the [address] [place] [address] at [phone].  We are open from 8:00am - 4:30p Monday, Thursday and Friday; 11:00am - 5pm on Tuesday and CLOSED Wednesday. Please give us 24-48 business hours to return your call. Call your doctor now or seek immediate medical care if:    You have symptoms of infection, such as: Increased pain, swelling, warmth, or redness. Red streaks leading from the area. Pus draining from the area. A fever.          [] Patient unable to sign Discharge Instructions given to ECF/Transportation/POA         Electronically signed by Jenise Marrero MD on 1/26/2023 at 10:43 AM

## 2023-01-26 NOTE — PROGRESS NOTES
Multilayer Compression Wrap   (Not Unna) Below the Knee    NAME:  Radha Manning  YOB: 1965  MEDICAL RECORD NUMBER:  [id number]  DATE:  1/26/2023       Removed old Multilayer wrap if present and washed leg with mild soap/water. Applied moisturizing agent to dry skin as needed. Applied primary and secondary dressing as ordered    Applied multilayered dressing below the knee to Right lower leg(s)  (4 Layer Compression Wrap ) . Instructed patient/caregiver not to remove dressing and to keep it clean and dry. Instructed patient/caregiver on complications to report to provider, such as pain, numbness in toes, heavy drainage, and slippage of dressing. Instructed patient on purpose of compression dressing and on activity and exercise recommendations.    Applied per   Guidelines    Electronically signed by Baldev Ritchie RN on 1/26/2023 at 9:27 AM

## 2023-02-02 ENCOUNTER — HOSPITAL ENCOUNTER (OUTPATIENT)
Dept: WOUND CARE | Age: 58
Discharge: HOME | End: 2023-02-02
Payer: COMMERCIAL

## 2023-02-02 VITALS
DIASTOLIC BLOOD PRESSURE: 82 MMHG | SYSTOLIC BLOOD PRESSURE: 152 MMHG | WEIGHT: 249.12 LBS | BODY MASS INDEX: 39.1 KG/M2 | TEMPERATURE: 97.6 F | HEIGHT: 67 IN | HEART RATE: 69 BPM | RESPIRATION RATE: 16 BRPM

## 2023-02-02 DIAGNOSIS — I82.220 THROMBOSIS OF INFERIOR VENA CAVA (HCC): ICD-10-CM

## 2023-02-02 DIAGNOSIS — I89.0 CHRONIC ACQUIRED LYMPHEDEMA: Primary | ICD-10-CM

## 2023-02-02 DIAGNOSIS — Z91.199 NONCOMPLIANCE: ICD-10-CM

## 2023-02-02 DIAGNOSIS — I82.423 THROMBOSIS OF BOTH ILIAC VEINS (HCC): ICD-10-CM

## 2023-02-02 DIAGNOSIS — Z79.01 CHRONIC ANTICOAGULATION: ICD-10-CM

## 2023-02-02 DIAGNOSIS — I82.5Z1 CHRONIC VENOUS EMBOLISM AND THROMBOSIS OF DEEP VESSELS OF DISTAL END OF RIGHT LOWER EXTREMITY (HCC): ICD-10-CM

## 2023-02-02 DIAGNOSIS — I87.311 IDIOPATHIC CHRONIC VENOUS HYPERTENSION OF RIGHT LOWER EXTREMITY WITH ULCER (HCC): ICD-10-CM

## 2023-02-02 DIAGNOSIS — L97.919 IDIOPATHIC CHRONIC VENOUS HYPERTENSION OF RIGHT LOWER EXTREMITY WITH ULCER (HCC): ICD-10-CM

## 2023-02-02 PROCEDURE — 6370000000 HC RX 637 (ALT 250 FOR IP): Performed by: SPECIALIST

## 2023-02-02 PROCEDURE — 11045 DBRDMT SUBQ TISS EACH ADDL: CPT | Performed by: SPECIALIST

## 2023-02-02 PROCEDURE — 11045 DBRDMT SUBQ TISS EACH ADDL: CPT

## 2023-02-02 PROCEDURE — 29581 APPL MULTLAYER CMPRN SYS LEG: CPT

## 2023-02-02 PROCEDURE — 11042 DBRDMT SUBQ TIS 1ST 20SQCM/<: CPT

## 2023-02-02 PROCEDURE — 11042 DBRDMT SUBQ TIS 1ST 20SQCM/<: CPT | Performed by: SPECIALIST

## 2023-02-02 RX ORDER — GINSENG 100 MG
CAPSULE ORAL ONCE
OUTPATIENT
Start: 2023-02-02 | End: 2023-02-02

## 2023-02-02 RX ORDER — LIDOCAINE HYDROCHLORIDE 40 MG/ML
SOLUTION TOPICAL ONCE
Status: COMPLETED | OUTPATIENT
Start: 2023-02-02 | End: 2023-02-02

## 2023-02-02 RX ORDER — WARFARIN SODIUM 5 MG/1
TABLET ORAL
Qty: 44 TABLET | Refills: 0 | Status: SHIPPED | OUTPATIENT
Start: 2023-02-02

## 2023-02-02 RX ORDER — LIDOCAINE HYDROCHLORIDE 20 MG/ML
JELLY TOPICAL ONCE
OUTPATIENT
Start: 2023-02-02 | End: 2023-02-02

## 2023-02-02 RX ORDER — GENTAMICIN SULFATE 1 MG/G
OINTMENT TOPICAL ONCE
OUTPATIENT
Start: 2023-02-02 | End: 2023-02-02

## 2023-02-02 RX ORDER — DOXYCYCLINE HYCLATE 50 MG/1
50 TABLET, FILM COATED ORAL 2 TIMES DAILY
Qty: 60 TABLET | Refills: 0 | Status: SHIPPED | OUTPATIENT
Start: 2023-02-02 | End: 2023-03-04

## 2023-02-02 RX ORDER — BACITRACIN, NEOMYCIN, POLYMYXIN B 400; 3.5; 5 [USP'U]/G; MG/G; [USP'U]/G
OINTMENT TOPICAL ONCE
OUTPATIENT
Start: 2023-02-02 | End: 2023-02-02

## 2023-02-02 RX ORDER — LIDOCAINE HYDROCHLORIDE 40 MG/ML
SOLUTION TOPICAL ONCE
OUTPATIENT
Start: 2023-02-02 | End: 2023-02-02

## 2023-02-02 RX ORDER — LIDOCAINE 40 MG/G
CREAM TOPICAL ONCE
OUTPATIENT
Start: 2023-02-02 | End: 2023-02-02

## 2023-02-02 RX ORDER — LIDOCAINE 50 MG/G
OINTMENT TOPICAL ONCE
OUTPATIENT
Start: 2023-02-02 | End: 2023-02-02

## 2023-02-02 RX ORDER — CLOBETASOL PROPIONATE 0.5 MG/G
OINTMENT TOPICAL ONCE
OUTPATIENT
Start: 2023-02-02 | End: 2023-02-02

## 2023-02-02 RX ORDER — BETAMETHASONE DIPROPIONATE 0.05 %
OINTMENT (GRAM) TOPICAL ONCE
OUTPATIENT
Start: 2023-02-02 | End: 2023-02-02

## 2023-02-02 RX ORDER — BACITRACIN ZINC AND POLYMYXIN B SULFATE 500; 1000 [USP'U]/G; [USP'U]/G
OINTMENT TOPICAL ONCE
OUTPATIENT
Start: 2023-02-02 | End: 2023-02-02

## 2023-02-02 RX ADMIN — LIDOCAINE HYDROCHLORIDE: 40 SOLUTION TOPICAL at 08:56

## 2023-02-02 ASSESSMENT — PAIN SCALES - GENERAL: PAINLEVEL_OUTOF10: 5

## 2023-02-02 ASSESSMENT — PAIN DESCRIPTION - PAIN TYPE: TYPE: CHRONIC PAIN

## 2023-02-02 ASSESSMENT — PAIN DESCRIPTION - ORIENTATION: ORIENTATION: RIGHT

## 2023-02-02 ASSESSMENT — PAIN DESCRIPTION - FREQUENCY: FREQUENCY: CONTINUOUS

## 2023-02-02 ASSESSMENT — PAIN DESCRIPTION - LOCATION: LOCATION: LEG

## 2023-02-02 ASSESSMENT — PAIN DESCRIPTION - DESCRIPTORS: DESCRIPTORS: ACHING

## 2023-02-02 NOTE — DISCHARGE INSTRUCTIONS
[facility] Physician Orders and Discharge Instructions  [address] [address] [facility] [address]  Telephone: [phone]  NAME:  Kodi Mahmood  YOB: 1965  MEDICAL RECORD NUMBER:  [id number]  DATE: 2/2/23     Return Appointment:  Return Appointment: With Peggy Hernandez MD  in  1 [facility])  [] Return Appointment for a Wound Assessment with the nurse on:     Future Appointments   Date Time Provider Eloina Zamarripa   2/13/2023  9:30 AM 6 Rujesus Guido Eloued: none  Medically necessary services: [] Skilled Nursing [] PT (Eval & Treat) [] OT (Eval & Treat) [] Social Work [] Dietician  [] Other:    Wound care instructions: If you smoke we ask that you refrain from smoking. Smoking inhibits wounds from healing. When taking antibiotics take the entire prescription as ordered. Do not stop taking until medication is all gone unless otherwise instructed. Exercise as tolerated. Keep weight off wounds and reposition every 2 hours if applicable. Do not get wounds wet in the bath or shower unless otherwise instructed by your physician. If your wound is on your foot or leg, you may purchase a cast bag. Please ask at the pharmacy. Wash hands with soap and water prior to and after every dressing change. [x]Wash wounds with: Vashe wash - Apply enough Vashe to soak a piece of gauze and place on wound bed for 5-10 minutes. No need to rinse after soaking. [x]Kiah wound Topical Treatments: Do not apply lotions, creams, or ointments to the skin around the wound bed unless directed as followed:   Apply around the wound: Zinc paste         [x]Wound Location: left lower legs   Apply Primary Dressing to wound:  poly mem silver  Secondary Dressing: 4X4 gauze pad   Avoid contact of tape with skin if possible.   When to change Dressing: DO NOT CHANGE      [x] Multilayer Compression Wrap:  Type: Applied on Right lower leg(s)  4 Layer Compression Wrap    Do not get leg(s) with wrap wet. If wraps become too tight call the center or completely remove the wrap. Elevate leg(s) above the level of the heart when sitting. Avoid prolonged standing in one place. Applied in Clinic on 2/2/2023  The Goal of this therapy is to reduce edema and get into long term compression garments to control venous insufficiency, lymphedema and reduce occurrence of venous ulcers    [x] Edema Control:  Apply: Compression Stocking on the Left leg 30-40mmHG. Remove at night and put on stocking first thing in the morning. Elevate leg(s) above the level of the heart for 30 minutes 4-5 times a day and/or when sitting. Avoid prolonged standing in one place. [x] Lymphedema Therapy:  Wear Lymphedema pumps twice a day at settings prescribed by your physician. Avoid prolonged standing in one place. Dietary:  Important dietary reminders:  1. Increase Protein intake (i.e. Lean meats, fish, eggs, legumes, and yogurt)  2. No added salt  3. If diabetic, follow a diabetic diet and check glucose prior to meals or as instructed by your physician. Dietary Supplements(Take twice a day unless instructed otherwise):  [] Alcus Roch  [] 30ml ProStat [] Milta Floor [] Ensure Max/Premier [] Other:    Your nurse  is:  Yovany Del Cid     Electronically signed by Jose C Osborne RN on 2/2/2023 at 9:08 AM     Christine Albarran 281: Should you experience any significant changes in your wound(s) or have questions about your wound care, please contact the [address] [place] [address] at [phone]. We are open from 8:00am - 4:30p Monday, Thursday and Friday; 11:00am - 5pm on Tuesday and CLOSED Wednesday. Please give us 24-48 business hours to return your call. Call your doctor now or seek immediate medical care if:    You have symptoms of infection, such as: Increased pain, swelling, warmth, or redness.   Red streaks leading from the area.  Pus draining from the area. A fever.          [] Patient unable to sign Discharge Instructions given to ECF/Transportation/POA

## 2023-02-02 NOTE — PROGRESS NOTES
[facility]  Progress Note and Procedure Note      Kodi Mahmood  MEDICAL RECORD NUMBER:  [id number]  AGE: 62 y.o. GENDER: male  : 1965  EPISODE DATE:  2023    Subjective:     Chief Complaint   Patient presents with    Wound Check     Right lower leg           HISTORY of PRESENT ILLNESS HPI     Kodi Mahmood is a 62 y.o. male who presents today for wound/ulcer evaluation. History of Wound Context: Continues follow-up for his chronic venous insufficiency with ulceration and lymphedema right lower extremity. He has been maintained in a 4 layer compression wrap this past week without difficulty. There has been no breakthrough drainage from the compression wrap.   Also difficult to assess how frequently patient is in fact utilizing his lymphedema pumps  Wound/Ulcer Pain Timing/Severity: none  Quality of pain: N/A  Severity:  0 / 10   Modifying Factors: None  Associated Signs/Symptoms: edema    Ulcer Identification:  Ulcer Type: venous    Contributing Factors: edema, venous stasis, lymphedema, obesity, and anticoagulation therapy    Acute Wound: N/A not an acute wound    PAST MEDICAL HISTORY        Diagnosis Date    DVT (deep venous thrombosis) (HCC)     Hx of blood clots     Pain and swelling of right lower leg        PAST SURGICAL HISTORY    Past Surgical History:   Procedure Laterality Date    BALLOON ANGIOPLASTY, ARTERY Right 2017    at [facility], ESOPHAGUS      SKIN SPLIT GRAFT Right        FAMILY HISTORY    Family History   Problem Relation Age of Onset    Diabetes Mother     Heart Attack Father        SOCIAL HISTORY    Social History     Tobacco Use    Smoking status: Never    Smokeless tobacco: Never   Vaping Use    Vaping Use: Never used   Substance Use Topics    Alcohol use: No    Drug use: No       ALLERGIES    No Known Allergies    MEDICATIONS    Current Outpatient Medications on File Prior to Encounter   Medication Sig Dispense Refill doxycycline hyclate (VIBRA-TABS) 100 MG tablet Take 1 tablet by mouth 2 times daily 60 tablet 0    ibuprofen (ADVIL;MOTRIN) 200 MG tablet Take 200 mg by mouth every 6 hours as needed for Pain      triamcinolone (KENALOG) 0.1 % ointment Apply topically 2 times daily (Patient not taking: Reported on 2/2/2023) 453.6 g 1    warfarin (COUMADIN) 5 MG tablet TAKE ONE AND ONE-HALF TABLETS BY MOUTH DAILY EXCEPT TAKE 2 TABLETS DAILY ON FRIDAY 180 tablet 1    Compression Stockings MISC by Does not apply route Strength 30-40mmHg  Style: Other pull up compression stockings  Extremity: bilateral  Donning aid recommended: No 1 each 0     No current facility-administered medications on file prior to encounter. REVIEW OF SYSTEMS  Review of Systems    Pertinent items are noted in HPI. Objective:      BP (!) 152/82   Pulse 69   Temp 97.6 °F (36.4 °C) (Temporal)   Resp 16   Ht 5' 7\" (1.702 m)   Wt 249 lb 1.9 oz (113 kg)   BMI 39.02 kg/m²     Wt Readings from Last 3 Encounters:   02/02/23 249 lb 1.9 oz (113 kg)   04/25/22 251 lb 6.4 oz (114 kg)   04/18/22 251 lb 6.4 oz (114 kg)       PHYSICAL EXAM    General Appearance: alert and oriented to person, place and time, well-developed and well-nourished, in no acute distress  Extremities: no cyanosis, no clubbing, 3 + nonpitting edema-  right lower extremity, and 3 full thickness ulcerations located on the right anterior right medial and right lateral knee containing granulation tissue fibrin minimal slough with evidence of epithelialization at the margins of the wound periwound minimal           maceration medial aspect of knee    Assessment:      1. Chronic acquired lymphedema    2. Idiopathic chronic venous hypertension of right lower extremity with ulcer ([address]    3. Noncompliance         Procedure Note  Indications:  Based on my examination of this patient's wound(s) today, sharp excision is required to promote healing and evaluate the extent healing.     Performed by: Tamar Corea Brian Grant MD    Consent obtained? Yes    Time out taken: Yes    Pain Control:       Debridement:Excisional Debridement    Using curette the wound was sharply debrided    down through and including the removal of  epidermis, dermis, and subcutaneous tissue. Devitalized Tissue Debrided:  fibrin, biofilm, and slough      Pre Debridement Measurements:  Are located in the Wound Documentation Flow Sheet   Wound #: 1, 2, and 3     Post  Debridement Measurements:  Wound 07/25/22 Leg Right; Lower;Medial #1 (Active)   Wound Image   02/02/23 0845   Wound Etiology Venous 07/25/22 1040   Wound Cleansed Cleansed with saline 02/02/23 0845   Dressing/Treatment Foam impregnated with Ag 02/02/23 0905   Wound Length (cm) 2.6 cm 02/02/23 0845   Wound Width (cm) 2.5 cm 02/02/23 0845   Wound Depth (cm) 0.1 cm 02/02/23 0845   Wound Surface Area (cm^2) 6.5 cm^2 02/02/23 0845   Change in Wound Size % (l*w) 72.52 02/02/23 0845   Wound Volume (cm^3) 0.65 cm^3 02/02/23 0845   Wound Healing % 73 02/02/23 0845   Post-Procedure Length (cm) 2.7 cm 02/02/23 0905   Post-Procedure Width (cm) 2.6 cm 02/02/23 0905   Post-Procedure Depth (cm) 0.3 cm 02/02/23 0905   Post-Procedure Surface Area (cm^2) 7.02 cm^2 02/02/23 0905   Post-Procedure Volume (cm^3) 2. 106 cm^3 02/02/23 0905   Distance Tunneling (cm) 0 cm 02/02/23 0845   Tunneling Position ___ O'Clock 0 12/22/22 1520   Undermining Starts ___ O'Clock 0 12/22/22 1520   Undermining Ends___ O'Clock 0 12/22/22 1520   Undermining Maxium Distance (cm) 0 02/02/23 0845   Wound Assessment Fibrin;Pink/red 02/02/23 0845   Drainage Amount Moderate 02/02/23 0845   Drainage Description Purulent;Brown;Yellow 02/02/23 0845   Odor Mild 02/02/23 0845   Kiah-wound Assessment Intact; Maceration 02/02/23 0845   Margins Defined edges 02/02/23 0845   Wound Thickness Description not for Pressure Injury Full thickness 02/02/23 0845   Number of days: 191       Wound 07/25/22 Leg Right; Anterior; Lower #2 (Active)   Wound Image   02/02/23 0845   Wound Etiology Venous 07/25/22 1040   Wound Cleansed Cleansed with saline 02/02/23 0845   Dressing/Treatment Foam impregnated with Ag 02/02/23 0905   Wound Length (cm) 2 cm 02/02/23 0845   Wound Width (cm) 5.2 cm 02/02/23 0845   Wound Depth (cm) 0.1 cm 02/02/23 0845   Wound Surface Area (cm^2) 10.4 cm^2 02/02/23 0845   Change in Wound Size % (l*w) 77.19 02/02/23 0845   Wound Volume (cm^3) 1.04 cm^3 02/02/23 0845   Wound Healing % 77 02/02/23 0845   Post-Procedure Length (cm) 2.1 cm 02/02/23 0905   Post-Procedure Width (cm) 5.3 cm 02/02/23 0905   Post-Procedure Depth (cm) 0.3 cm 02/02/23 0905   Post-Procedure Surface Area (cm^2) 11.13 cm^2 02/02/23 0905   Post-Procedure Volume (cm^3) 3.339 cm^3 02/02/23 0905   Distance Tunneling (cm) 0 cm 02/02/23 0845   Tunneling Position ___ O'Clock 0 12/22/22 1520   Undermining Starts ___ O'Clock 0 12/22/22 1520   Undermining Ends___ O'Clock 0 12/22/22 1520   Undermining Maxium Distance (cm) 0 02/02/23 0845   Wound Assessment Fibrin;Slough;Pink/red 02/02/23 0845   Drainage Amount Moderate 02/02/23 0845   Drainage Description Purulent;Yellow;Brown 02/02/23 0845   Odor Mild 02/02/23 0845   Kiah-wound Assessment Maceration 02/02/23 0845   Margins Defined edges 02/02/23 0845   Wound Thickness Description not for Pressure Injury Full thickness 02/02/23 0845   Number of days: 191       Wound 07/25/22 Leg Right;Posterior; Lower #3 (Active)   Wound Image   02/02/23 0845   Wound Etiology Venous 07/25/22 1040   Wound Cleansed Cleansed with saline 02/02/23 0845   Dressing/Treatment Foam impregnated with Ag 02/02/23 0905   Wound Length (cm) 3.5 cm 02/02/23 0845   Wound Width (cm) 1.4 cm 02/02/23 0845   Wound Depth (cm) 0.1 cm 02/02/23 0845   Wound Surface Area (cm^2) 4.9 cm^2 02/02/23 0845   Change in Wound Size % (l*w) 88.86 02/02/23 0845   Wound Volume (cm^3) 0.49 cm^3 02/02/23 0845   Wound Healing % 89 02/02/23 0845   Post-Procedure Length (cm) 3.6 cm 02/02/23 0905 Post-Procedure Width (cm) 1.5 cm 02/02/23 0905   Post-Procedure Depth (cm) 0.3 cm 02/02/23 0905   Post-Procedure Surface Area (cm^2) 5.4 cm^2 02/02/23 0905   Post-Procedure Volume (cm^3) 1.62 cm^3 02/02/23 0905   Distance Tunneling (cm) 0 cm 02/02/23 0845   Tunneling Position ___ O'Clock 0 12/22/22 1520   Undermining Starts ___ O'Clock 0 12/22/22 1520   Undermining Ends___ O'Clock 0 12/22/22 1520   Undermining Maxium Distance (cm) 0 02/02/23 0845   Wound Assessment Pink/red;Fibrin 02/02/23 0845   Drainage Amount Moderate 02/02/23 0845   Drainage Description Brown;Yellow;Purulent 02/02/23 0845   Odor Mild 02/02/23 0845   Kiah-wound Assessment Erosion; Maceration 02/02/23 0845   Margins Undefined edges 02/02/23 0845   Wound Thickness Description not for Pressure Injury Full thickness 02/02/23 0845   Number of days: 191          Percent of Wound Debrided: 100%    Total Surface Area Debrided:  23 sq cm    Diabetic/Pressure/Non Pressure Ulcers only:  Ulcer: Non-Pressure ulcer, fat layer exposed    Bleeding: Minimal    Hemostasis Achieved: by pressure    Procedural Pain: 0  / 10     Post Procedural Pain: 0 / 10     Response to treatment:  Well tolerated by patient. There has been improvement in all ulcer measurements as well as marked improvement in maceration        Plan:     Treatment Note: Please see attached Discharge Instructions. These instructions were given and signed by the patient or POA    New Prescriptions    DOXYCYCLINE HYCLATE 50 MG TABS    Take 50 mg by mouth 2 times daily       Orders Placed This Encounter   Procedures    Initiate Outpatient Wound Care Protocol       Discharge Instructions          [facility] Physician Orders and Discharge Instructions  [address] [address] [address] [place] [address]. [address] [address]  Telephone: [phone]  NAME:  Sarah Joseph  YOB: 1965  MEDICAL RECORD NUMBER:  [id number]  DATE: 2/2/23     Return Appointment:  Return Appointment: With Sherley Taveras MD  in  1 [facility])  [] Return Appointment for a Wound Assessment with the nurse on:     Future Appointments   Date Time Provider Eloina Woodi   2/13/2023  9:30 AM 6 Christine Dos Santos: none  Medically necessary services: [] Skilled Nursing [] PT (Eval & Treat) [] OT (Eval & Treat) [] Social Work [] Dietician  [] Other:    Wound care instructions: If you smoke we ask that you refrain from smoking. Smoking inhibits wounds from healing. When taking antibiotics take the entire prescription as ordered. Do not stop taking until medication is all gone unless otherwise instructed. Exercise as tolerated. Keep weight off wounds and reposition every 2 hours if applicable. Do not get wounds wet in the bath or shower unless otherwise instructed by your physician. If your wound is on your foot or leg, you may purchase a cast bag. Please ask at the pharmacy. Wash hands with soap and water prior to and after every dressing change. [x]Wash wounds with: Vashe wash - Apply enough Vashe to soak a piece of gauze and place on wound bed for 5-10 minutes. No need to rinse after soaking. [x]Kiah wound Topical Treatments: Do not apply lotions, creams, or ointments to the skin around the wound bed unless directed as followed:   Apply around the wound: Zinc paste         [x]Wound Location: left lower legs   Apply Primary Dressing to wound:  poly mem silver  Secondary Dressing: 4X4 gauze pad   Avoid contact of tape with skin if possible. When to change Dressing: DO NOT CHANGE      [x] Multilayer Compression Wrap:  Type: Applied on Right lower leg(s)  4 Layer Compression Wrap    Do not get leg(s) with wrap wet. If wraps become too tight call the center or completely remove the wrap. Elevate leg(s) above the level of the heart when sitting. Avoid prolonged standing in one place.    Applied in Clinic on 2/2/2023  The Goal of this therapy is to reduce edema and get into long term compression garments to control venous insufficiency, lymphedema and reduce occurrence of venous ulcers    [x] Edema Control:  Apply: Compression Stocking on the Left leg 30-40mmHG. Remove at night and put on stocking first thing in the morning. Elevate leg(s) above the level of the heart for 30 minutes 4-5 times a day and/or when sitting. Avoid prolonged standing in one place. [x] Lymphedema Therapy:  Wear Lymphedema pumps twice a day at settings prescribed by your physician. Avoid prolonged standing in one place. Dietary:  Important dietary reminders:  1. Increase Protein intake (i.e. Lean meats, fish, eggs, legumes, and yogurt)  2. No added salt  3. If diabetic, follow a diabetic diet and check glucose prior to meals or as instructed by your physician. Dietary Supplements(Take twice a day unless instructed otherwise):  [] Curt Torres  [] 30ml ProStat [] Ezella Hammers [] Ensure Max/Premier [] Other:    Your nurse  is:  David Moss     Electronically signed by Tali Lerma RN on 2/2/2023 at 9:08 AM     Christine Albarran 281: Should you experience any significant changes in your wound(s) or have questions about your wound care, please contact the [address] [place] [address] at [phone]. We are open from 8:00am - 4:30p Monday, Thursday and Friday; 11:00am - 5pm on Tuesday and CLOSED Wednesday. Please give us 24-48 business hours to return your call. Call your doctor now or seek immediate medical care if:    You have symptoms of infection, such as: Increased pain, swelling, warmth, or redness. Red streaks leading from the area. Pus draining from the area. A fever.          [] Patient unable to sign Discharge Instructions given to ECF/Transportation/POA         Electronically signed by Yessi Pinon MD on 2/2/2023 at 9:21 AM

## 2023-02-02 NOTE — PROGRESS NOTES
Multilayer Compression Wrap   (Not Unna) Below the Knee    NAME:  Sarah Joseph  YOB: 1965  MEDICAL RECORD NUMBER:  [id number]  DATE:  2/2/2023       Removed old Multilayer wrap if present and washed leg with mild soap/water. Applied moisturizing agent to dry skin as needed. Applied primary and secondary dressing as ordered    Applied multilayered dressing below the knee to Right lower leg(s)  (4 Layer Compression Wrap ) . Instructed patient/caregiver not to remove dressing and to keep it clean and dry. Instructed patient/caregiver on complications to report to provider, such as pain, numbness in toes, heavy drainage, and slippage of dressing. Instructed patient on purpose of compression dressing and on activity and exercise recommendations.    Applied per   Guidelines    Electronically signed by Trish Wadsworth RN on 2/2/2023 at 9:20 AM

## 2023-02-09 ENCOUNTER — HOSPITAL ENCOUNTER (OUTPATIENT)
Dept: WOUND CARE | Age: 58
Discharge: HOME | End: 2023-02-09
Payer: COMMERCIAL

## 2023-02-09 VITALS
RESPIRATION RATE: 16 BRPM | SYSTOLIC BLOOD PRESSURE: 135 MMHG | DIASTOLIC BLOOD PRESSURE: 80 MMHG | HEART RATE: 64 BPM | TEMPERATURE: 97.6 F

## 2023-02-09 DIAGNOSIS — L97.919 IDIOPATHIC CHRONIC VENOUS HYPERTENSION OF RIGHT LOWER EXTREMITY WITH ULCER (HCC): ICD-10-CM

## 2023-02-09 DIAGNOSIS — I89.0 CHRONIC ACQUIRED LYMPHEDEMA: Primary | ICD-10-CM

## 2023-02-09 DIAGNOSIS — I87.311 IDIOPATHIC CHRONIC VENOUS HYPERTENSION OF RIGHT LOWER EXTREMITY WITH ULCER (HCC): ICD-10-CM

## 2023-02-09 DIAGNOSIS — Z91.199 NONCOMPLIANCE: ICD-10-CM

## 2023-02-09 PROCEDURE — 6370000000 HC RX 637 (ALT 250 FOR IP): Performed by: SPECIALIST

## 2023-02-09 PROCEDURE — 11042 DBRDMT SUBQ TIS 1ST 20SQCM/<: CPT

## 2023-02-09 PROCEDURE — 29581 APPL MULTLAYER CMPRN SYS LEG: CPT

## 2023-02-09 RX ORDER — LIDOCAINE 50 MG/G
OINTMENT TOPICAL ONCE
OUTPATIENT
Start: 2023-02-09 | End: 2023-02-09

## 2023-02-09 RX ORDER — BACITRACIN ZINC AND POLYMYXIN B SULFATE 500; 1000 [USP'U]/G; [USP'U]/G
OINTMENT TOPICAL ONCE
OUTPATIENT
Start: 2023-02-09 | End: 2023-02-09

## 2023-02-09 RX ORDER — LIDOCAINE HYDROCHLORIDE 40 MG/ML
SOLUTION TOPICAL ONCE
OUTPATIENT
Start: 2023-02-09 | End: 2023-02-09

## 2023-02-09 RX ORDER — LIDOCAINE HYDROCHLORIDE 20 MG/ML
JELLY TOPICAL ONCE
OUTPATIENT
Start: 2023-02-09 | End: 2023-02-09

## 2023-02-09 RX ORDER — BETAMETHASONE DIPROPIONATE 0.05 %
OINTMENT (GRAM) TOPICAL ONCE
OUTPATIENT
Start: 2023-02-09 | End: 2023-02-09

## 2023-02-09 RX ORDER — GINSENG 100 MG
CAPSULE ORAL ONCE
OUTPATIENT
Start: 2023-02-09 | End: 2023-02-09

## 2023-02-09 RX ORDER — GENTAMICIN SULFATE 1 MG/G
OINTMENT TOPICAL ONCE
OUTPATIENT
Start: 2023-02-09 | End: 2023-02-09

## 2023-02-09 RX ORDER — BACITRACIN, NEOMYCIN, POLYMYXIN B 400; 3.5; 5 [USP'U]/G; MG/G; [USP'U]/G
OINTMENT TOPICAL ONCE
OUTPATIENT
Start: 2023-02-09 | End: 2023-02-09

## 2023-02-09 RX ORDER — CLOBETASOL PROPIONATE 0.5 MG/G
OINTMENT TOPICAL ONCE
OUTPATIENT
Start: 2023-02-09 | End: 2023-02-09

## 2023-02-09 RX ORDER — LIDOCAINE 40 MG/G
CREAM TOPICAL ONCE
OUTPATIENT
Start: 2023-02-09 | End: 2023-02-09

## 2023-02-09 RX ORDER — LIDOCAINE HYDROCHLORIDE 40 MG/ML
SOLUTION TOPICAL ONCE
Status: COMPLETED | OUTPATIENT
Start: 2023-02-09 | End: 2023-02-09

## 2023-02-09 RX ADMIN — LIDOCAINE HYDROCHLORIDE: 40 SOLUTION TOPICAL at 09:00

## 2023-02-09 ASSESSMENT — PAIN SCALES - GENERAL: PAINLEVEL_OUTOF10: 4

## 2023-02-09 ASSESSMENT — PAIN DESCRIPTION - ONSET: ONSET: ON-GOING

## 2023-02-09 ASSESSMENT — PAIN DESCRIPTION - DESCRIPTORS: DESCRIPTORS: ACHING

## 2023-02-09 ASSESSMENT — PAIN DESCRIPTION - PAIN TYPE: TYPE: CHRONIC PAIN

## 2023-02-09 ASSESSMENT — PAIN DESCRIPTION - FREQUENCY: FREQUENCY: CONTINUOUS

## 2023-02-09 ASSESSMENT — PAIN DESCRIPTION - ORIENTATION: ORIENTATION: RIGHT

## 2023-02-09 ASSESSMENT — PAIN DESCRIPTION - LOCATION: LOCATION: LEG

## 2023-02-09 NOTE — PROGRESS NOTES
[facility]  Progress Note and Procedure Note      Hudson Serna  MEDICAL RECORD NUMBER:  [id number]  AGE: 62 y.o. GENDER: male  : 1965  EPISODE DATE:  2023    Subjective:     Chief Complaint   Patient presents with    Wound Check     Right lower leg         HISTORY of PRESENT ILLNESS HPI     Hudson Serna is a 62 y.o. male who presents today for wound/ulcer evaluation. History of Wound Context: Continues follow-up for his chronic venous insufficiency with ulceration and lymphedema right lower extremity. He has been maintained in a 4 layer compression wrap this past week without difficulty. There has been no breakthrough drainage from the compression wrap.   Also difficult to assess how frequently patient is in fact utilizing his lymphedema pumps  Wound/Ulcer Pain Timing/Severity: none  Quality of pain: N/A  Severity:  0 / 10   Modifying Factors: None  Associated Signs/Symptoms: edema    Ulcer Identification:  Ulcer Type: venous    Contributing Factors: edema, venous stasis, lymphedema, obesity, and anticoagulation therapy    Acute Wound: N/A not an acute wound    PAST MEDICAL HISTORY        Diagnosis Date    DVT (deep venous thrombosis) (HCC)     Hx of blood clots     Pain and swelling of right lower leg        PAST SURGICAL HISTORY    Past Surgical History:   Procedure Laterality Date    BALLOON ANGIOPLASTY, ARTERY Right 2017    at [facility], ESOPHAGUS      SKIN SPLIT GRAFT Right        FAMILY HISTORY    Family History   Problem Relation Age of Onset    Diabetes Mother     Heart Attack Father        SOCIAL HISTORY    Social History     Tobacco Use    Smoking status: Never    Smokeless tobacco: Never   Vaping Use    Vaping Use: Never used   Substance Use Topics    Alcohol use: No    Drug use: No       ALLERGIES    No Known Allergies    MEDICATIONS    Current Outpatient Medications on File Prior to Encounter   Medication Sig Dispense Refill Doxycycline Hyclate 50 MG TABS Take 50 mg by mouth 2 times daily 60 tablet 0    warfarin (COUMADIN) 5 MG tablet TAKE 1 AND 1/2 TABLET BY MOUTH DAILY EXCEPT TAKE 2 TABLETS DAILY ON FRIDAY 44 tablet 0    ibuprofen (ADVIL;MOTRIN) 200 MG tablet Take 200 mg by mouth every 6 hours as needed for Pain (Patient not taking: Reported on 2/9/2023)      triamcinolone (KENALOG) 0.1 % ointment Apply topically 2 times daily (Patient not taking: No sig reported) 453.6 g 1    Compression Stockings MISC by Does not apply route Strength 30-40mmHg  Style: Other pull up compression stockings  Extremity: bilateral  Donning aid recommended: No 1 each 0     No current facility-administered medications on file prior to encounter. REVIEW OF SYSTEMS  Review of Systems    Pertinent items are noted in HPI. Objective:      /80   Pulse 64   Temp 97.6 °F (36.4 °C) (Temporal)   Resp 16     Wt Readings from Last 3 Encounters:   02/02/23 249 lb 1.9 oz (113 kg)   04/25/22 251 lb 6.4 oz (114 kg)   04/18/22 251 lb 6.4 oz (114 kg)       PHYSICAL EXAM    General Appearance: alert and oriented to person, place and time, well-developed and well-nourished, in no acute distress  Extremities: no cyanosis, no clubbing, 3 + edema-  right lower extremity, and 3 full-thickness ulcerations with granulation tissue and fibrin located on the right anterior, right medial and right lateral knee. periwound surrounding anterior and medial knee is dry periwound lateral knee ulcer shows moderate maceration    Assessment:      1. Chronic acquired lymphedema    2. Idiopathic chronic venous hypertension of right lower extremity with ulcer ([address]    3. Noncompliance         Procedure Note  Indications:  Based on my examination of this patient's wound(s) today, sharp excision is required to promote healing and evaluate the extent healing. Performed by: Enrique Maradiaga MD    Consent obtained?  Yes    Time out taken: Yes    Pain Control: Anesthetic: 4% Lidocaine Liquid Topical     Debridement:Excisional Debridement    Using curette the wound was sharply debrided    down through and including the removal of  epidermis, dermis, and subcutaneous tissue. Devitalized Tissue Debrided:  fibrin and biofilm      Pre Debridement Measurements:  Are located in the Wound Documentation Flow Sheet   Wound #: 1, 2, and 3     Post  Debridement Measurements:  Wound 07/25/22 Leg Right; Lower;Medial #1 (Active)   Wound Image   02/02/23 0845   Wound Etiology Venous 07/25/22 1040   Wound Cleansed Cleansed with saline 02/09/23 0855   Dressing/Treatment Foam impregnated with Ag 02/09/23 0915   Wound Length (cm) 2 cm 02/09/23 0855   Wound Width (cm) 2.6 cm 02/09/23 0855   Wound Depth (cm) 0.1 cm 02/09/23 0855   Wound Surface Area (cm^2) 5.2 cm^2 02/09/23 0855   Change in Wound Size % (l*w) 78.01 02/09/23 0855   Wound Volume (cm^3) 0.52 cm^3 02/09/23 0855   Wound Healing % 78 02/09/23 0855   Post-Procedure Length (cm) 2.1 cm 02/09/23 0915   Post-Procedure Width (cm) 2.7 cm 02/09/23 0915   Post-Procedure Depth (cm) 0.3 cm 02/09/23 0915   Post-Procedure Surface Area (cm^2) 5.67 cm^2 02/09/23 0915   Post-Procedure Volume (cm^3) 1.701 cm^3 02/09/23 0915   Distance Tunneling (cm) 0 cm 02/09/23 0855   Tunneling Position ___ O'Clock 0 12/22/22 1520   Undermining Starts ___ O'Clock 0 12/22/22 1520   Undermining Ends___ O'Clock 0 12/22/22 1520   Undermining Maxium Distance (cm) 0 02/09/23 0855   Wound Assessment Fibrin;Pink/red 02/09/23 0855   Drainage Amount Small 02/09/23 0855   Drainage Description Serosanguinous;Brown 02/09/23 0855   Odor None 02/09/23 0855   Kiah-wound Assessment Intact 02/09/23 0855   Margins Defined edges 02/09/23 0855   Wound Thickness Description not for Pressure Injury Full thickness 02/09/23 0855   Number of days: 198       Wound 07/25/22 Leg Right; Anterior; Lower #2 (Active)   Wound Image   02/02/23 0845   Wound Etiology Venous 07/25/22 1040   Wound Cleansed Cleansed with saline 02/09/23 0855   Dressing/Treatment Collagen with Ag 02/09/23 0915   Wound Length (cm) 1.4 cm 02/09/23 0855   Wound Width (cm) 5 cm 02/09/23 0855   Wound Depth (cm) 0.1 cm 02/09/23 0855   Wound Surface Area (cm^2) 7 cm^2 02/09/23 0855   Change in Wound Size % (l*w) 84.65 02/09/23 0855   Wound Volume (cm^3) 0.7 cm^3 02/09/23 0855   Wound Healing % 85 02/09/23 0855   Post-Procedure Length (cm) 1.5 cm 02/09/23 0915   Post-Procedure Width (cm) 5.1 cm 02/09/23 0915   Post-Procedure Depth (cm) 0.3 cm 02/09/23 0915   Post-Procedure Surface Area (cm^2) 7.65 cm^2 02/09/23 0915   Post-Procedure Volume (cm^3) 2.295 cm^3 02/09/23 0915   Distance Tunneling (cm) 0 cm 02/09/23 0855   Tunneling Position ___ O'Clock 0 12/22/22 1520   Undermining Starts ___ O'Clock 0 12/22/22 1520   Undermining Ends___ O'Clock 0 12/22/22 1520   Undermining Maxium Distance (cm) 0 02/09/23 0855   Wound Assessment Fibrin;Pink/red 02/09/23 0855   Drainage Amount Small 02/09/23 0855   Drainage Description Serosanguinous;Brown 02/09/23 0855   Odor None 02/09/23 0855   Kiah-wound Assessment Maceration 02/09/23 0855   Margins Defined edges 02/09/23 0855   Wound Thickness Description not for Pressure Injury Full thickness 02/09/23 0855   Number of days: 198       Wound 07/25/22 Leg Right;Posterior; Lower #3 (Active)   Wound Image   02/02/23 0845   Wound Etiology Venous 07/25/22 1040   Wound Cleansed Cleansed with saline 02/09/23 0855   Dressing/Treatment Foam impregnated with Ag 02/09/23 0915   Wound Length (cm) 3.5 cm 02/09/23 0855   Wound Width (cm) 1.5 cm 02/09/23 0855   Wound Depth (cm) 0.1 cm 02/09/23 0855   Wound Surface Area (cm^2) 5.25 cm^2 02/09/23 0855   Change in Wound Size % (l*w) 88.07 02/09/23 0855   Wound Volume (cm^3) 0.525 cm^3 02/09/23 0855   Wound Healing % 88 02/09/23 0855   Post-Procedure Length (cm) 3.6 cm 02/09/23 0915   Post-Procedure Width (cm) 1.6 cm 02/09/23 0915   Post-Procedure Depth (cm) 0.3 cm 02/09/23 0915   Post-Procedure Surface Area (cm^2) 5.76 cm^2 02/09/23 0915   Post-Procedure Volume (cm^3) 1.728 cm^3 02/09/23 0915   Distance Tunneling (cm) 0 cm 02/09/23 0855   Tunneling Position ___ O'Clock 0 12/22/22 1520   Undermining Starts ___ O'Clock 0 12/22/22 1520   Undermining Ends___ O'Clock 0 12/22/22 1520   Undermining Maxium Distance (cm) 0 02/09/23 0855   Wound Assessment Pink/red;Fibrin 02/09/23 0855   Drainage Amount Moderate 02/09/23 0855   Drainage Description Serosanguinous;Brown 02/09/23 0855   Odor None 02/09/23 0855   Kiah-wound Assessment Erosion; Maceration 02/09/23 0855   Margins Undefined edges 02/09/23 0855   Wound Thickness Description not for Pressure Injury Full thickness 02/09/23 0855   Number of days: 198          Percent of Wound Debrided: 100%    Total Surface Area Debrided:  18 sq cm    Diabetic/Pressure/Non Pressure Ulcers only:  Ulcer: Non-Pressure ulcer, fat layer exposed    Bleeding: Minimal    Hemostasis Achieved: by pressure    Procedural Pain: 0  / 10     Post Procedural Pain: 0 / 10     Response to treatment:  Well tolerated by patient. Will attempt to use collagen-based product on the anterior ulcer to assist in wound healing. Continue with PolyMem silver to medial and lateral ulcerations with Calmoseptine on the area of maceration        Plan:     Treatment Note: Please see attached Discharge Instructions. These instructions were given and signed by the patient or POA    New Prescriptions    No medications on file       Orders Placed This Encounter   Procedures    Initiate Outpatient Wound Care Protocol       Discharge Instructions          [facility] Physician Orders and Discharge Instructions  [address] [place] [address]. [address] [facility]. [facility].  [address]  Telephone: [phone]  NAME:  Venu Muhammad  YOB: 1965  MEDICAL RECORD NUMBER:  [id number]  DATE: 2/9/23     Return Appointment:  Return Appointment: With Reji Campbell MD  in  1 Week(s)  [] Return Appointment for a Wound Assessment with the nurse on:     Future Appointments   Date Time Provider Eloina Zamarripa   2/13/2023  9:30 AM [address]: none  Medically necessary services: [] Skilled Nursing [] PT (Eval & Treat) [] OT (Eval & Treat) [] Social Work [] Dietician  [] Other:    Wound care instructions: If you smoke we ask that you refrain from smoking. Smoking inhibits wounds from healing. When taking antibiotics take the entire prescription as ordered. Do not stop taking until medication is all gone unless otherwise instructed. Exercise as tolerated. Keep weight off wounds and reposition every 2 hours if applicable. Do not get wounds wet in the bath or shower unless otherwise instructed by your physician. If your wound is on your foot or leg, you may purchase a cast bag. Please ask at the pharmacy. Wash hands with soap and water prior to and after every dressing change. [x]Wash wounds with: Vashe wash - Apply enough Vashe to soak a piece of gauze and place on wound bed for 5-10 minutes. No need to rinse after soaking. [x]Kiah wound Topical Treatments: Do not apply lotions, creams, or ointments to the skin around the wound bed unless directed as followed:   Apply around the wound: Zinc paste         [x]Wound Location: right lower leg wounds   Apply Primary Dressing to wound:  poly mem silver  on medial and posterior wounds. Candice to anterior wound. Secondary Dressing: 4X4 gauze pad, abd pad or kerra max to posterior wound   Avoid contact of tape with skin if possible. When to change Dressing: DO NOT CHANGE          [x] Multilayer Compression Wrap:  Type: Applied on Right lower leg(s)  4 Layer Compression Wrap    Do not get leg(s) with wrap wet. If wraps become too tight call the center or completely remove the wrap. Elevate leg(s) above the level of the heart when sitting.   Avoid prolonged standing in one place. Applied in Clinic on 2/9/2023  The Goal of this therapy is to reduce edema and get into long term compression garments to control venous insufficiency, lymphedema and reduce occurrence of venous ulcers    [x] Edema Control:  Apply: Compression Stocking on the Right leg  Apply every morning immediately when getting up. Remove every night before going to bed unless instructed otherwise     Elevate leg(s) above the level of the heart for 30 minutes 4-5 times a day and/or when sitting. Avoid prolonged standing in one place. [x] Lymphedema Therapy:  Wear Lymphedema pumps twice a day at settings prescribed by your physician. Avoid prolonged standing in one place. Dietary:  Important dietary reminders:  1. Increase Protein intake (i.e. Lean meats, fish, eggs, legumes, and yogurt)  2. No added salt  3. If diabetic, follow a diabetic diet and check glucose prior to meals or as instructed by your physician. Dietary Supplements(Take twice a day unless instructed otherwise):  [] Leesa Barrios  [] 30ml ProStat [] Keon Sosa [] Ensure Max/Premier [] Other:    Your nurse  is:  Tami Lord     Electronically signed by Ravinder Christie RN on 2/9/2023 at 9:19 AM     [address]: Should you experience any significant changes in your wound(s) or have questions about your wound care, please contact the [address] [place] [address] at [phone]. Hours of operation  Mon:  8AM - 2PM  Tue: 11AM - 5PM  Wed: CLOSED  Thur: 8AM - 4:30PM  Fri:  8AM - 4:30PM  The office is closed on all major holidays. Please give us 24-48 business hours to return your call. These hours of operation are subject to change. If you need help with your wounds and cannot wait until we are available, contact your PCP or go to your preferred emergency room. Call your doctor now or seek immediate medical care if:    You have symptoms of infection, such as: Increased pain, swelling, warmth, or redness.   Red streaks leading from the area. Pus draining from the area. A fever.          [] Patient unable to sign Discharge Instructions given to ECF/Transportation/POA         Electronically signed by Jesi Cisse MD on 2/9/2023 at 9:40 AM

## 2023-02-09 NOTE — DISCHARGE INSTRUCTIONS
[address] [facility] Physician Orders and Discharge Instructions  [address] [facility]   [address] [address] [facility]. [place]. [address]  Telephone: [phone]  NAME:  Eliana Khalil  YOB: 1965  MEDICAL RECORD NUMBER:  [id number]  DATE: 2/9/23     Return Appointment:  Return Appointment: With Hardy Hebert MD  in  [facility])  [] Return Appointment for a Wound Assessment with the nurse on:     Future Appointments   Date Time Provider Eloina Zamarripa   2/13/2023  9:30 AM [address]: none  Medically necessary services: [] Skilled Nursing [] PT (Eval & Treat) [] OT (Eval & Treat) [] Social Work [] Dietician  [] Other:    Wound care instructions: If you smoke we ask that you refrain from smoking. Smoking inhibits wounds from healing. When taking antibiotics take the entire prescription as ordered. Do not stop taking until medication is all gone unless otherwise instructed. Exercise as tolerated. Keep weight off wounds and reposition every 2 hours if applicable. Do not get wounds wet in the bath or shower unless otherwise instructed by your physician. If your wound is on your foot or leg, you may purchase a cast bag. Please ask at the pharmacy. Wash hands with soap and water prior to and after every dressing change. [x]Wash wounds with: Vashe wash - Apply enough Vashe to soak a piece of gauze and place on wound bed for 5-10 minutes. No need to rinse after soaking. [x]Kiah wound Topical Treatments: Do not apply lotions, creams, or ointments to the skin around the wound bed unless directed as followed:   Apply around the wound: Zinc paste         [x]Wound Location: right lower leg wounds   Apply Primary Dressing to wound:  poly mem silver  on medial and posterior wounds. Candice to anterior wound.   Secondary Dressing: 4X4 gauze pad, abd pad or kerra max to posterior wound   Avoid contact of tape with skin if possible. When to change Dressing: DO NOT CHANGE          [x] Multilayer Compression Wrap:  Type: Applied on Right lower leg(s)  4 Layer Compression Wrap    Do not get leg(s) with wrap wet. If wraps become too tight call the center or completely remove the wrap. Elevate leg(s) above the level of the heart when sitting. Avoid prolonged standing in one place. Applied in Clinic on 2/9/2023  The Goal of this therapy is to reduce edema and get into long term compression garments to control venous insufficiency, lymphedema and reduce occurrence of venous ulcers    [x] Edema Control:  Apply: Compression Stocking on the Right leg  Apply every morning immediately when getting up. Remove every night before going to bed unless instructed otherwise     Elevate leg(s) above the level of the heart for 30 minutes 4-5 times a day and/or when sitting. Avoid prolonged standing in one place. [x] Lymphedema Therapy:  Wear Lymphedema pumps twice a day at settings prescribed by your physician. Avoid prolonged standing in one place. Dietary:  Important dietary reminders:  1. Increase Protein intake (i.e. Lean meats, fish, eggs, legumes, and yogurt)  2. No added salt  3. If diabetic, follow a diabetic diet and check glucose prior to meals or as instructed by your physician. Dietary Supplements(Take twice a day unless instructed otherwise):  [] Clora Rhymes  [] 30ml ProStat [] Leopold Raw [] Ensure Max/Premier [] Other:    Your nurse  is:  Valerie Mcmahon     Electronically signed by Delia Hayden RN on 2/9/2023 at 9:19 AM     [address]: Should you experience any significant changes in your wound(s) or have questions about your wound care, please contact the [address] [place] [address] at [phone]. Hours of operation  Mon:  8AM - 2PM  Tue: 11AM - 5PM  Wed: CLOSED  Thur: 8AM - 4:30PM  Fri:  8AM - 4:30PM  The office is closed on all major holidays.     Please give us 24-48 business hours to return your call. These hours of operation are subject to change. If you need help with your wounds and cannot wait until we are available, contact your PCP or go to your preferred emergency room. Call your doctor now or seek immediate medical care if:    You have symptoms of infection, such as: Increased pain, swelling, warmth, or redness. Red streaks leading from the area. Pus draining from the area. A fever.          [] Patient unable to sign Discharge Instructions given to ECF/Transportation/POA

## 2023-02-09 NOTE — PROGRESS NOTES
Multilayer Compression Wrap   (Not Unna) Below the Knee    NAME:  Samra Morris  YOB: 1965  MEDICAL RECORD NUMBER:  [id number]  DATE:  2/9/2023       Removed old Multilayer wrap if present and washed leg with mild soap/water. Applied moisturizing agent to dry skin as needed. Applied primary and secondary dressing as ordered    Applied multilayered dressing below the knee to Right lower leg(s)  (4 Layer Compression Wrap ) . Instructed patient/caregiver not to remove dressing and to keep it clean and dry. Instructed patient/caregiver on complications to report to provider, such as pain, numbness in toes, heavy drainage, and slippage of dressing. Instructed patient on purpose of compression dressing and on activity and exercise recommendations.    Applied per   Guidelines    Electronically signed by Morgan Landrum RN on 2/9/2023 at 9:24 AM

## 2023-02-16 ENCOUNTER — TELEPHONE (OUTPATIENT)
Dept: PHARMACY | Age: 58
End: 2023-02-16

## 2023-02-16 ENCOUNTER — HOSPITAL ENCOUNTER (OUTPATIENT)
Dept: WOUND CARE | Age: 58
Discharge: HOME | End: 2023-02-16
Payer: COMMERCIAL

## 2023-02-16 ENCOUNTER — ANTI-COAG VISIT (OUTPATIENT)
Dept: PHARMACY | Age: 58
End: 2023-02-16
Payer: COMMERCIAL

## 2023-02-16 ENCOUNTER — TELEPHONE (OUTPATIENT)
Dept: WOUND CARE | Age: 58
End: 2023-02-16

## 2023-02-16 VITALS
RESPIRATION RATE: 16 BRPM | TEMPERATURE: 97.5 F | HEART RATE: 68 BPM | SYSTOLIC BLOOD PRESSURE: 188 MMHG | DIASTOLIC BLOOD PRESSURE: 65 MMHG

## 2023-02-16 DIAGNOSIS — I89.0 CHRONIC ACQUIRED LYMPHEDEMA: Primary | ICD-10-CM

## 2023-02-16 DIAGNOSIS — I87.311 IDIOPATHIC CHRONIC VENOUS HYPERTENSION OF RIGHT LOWER EXTREMITY WITH ULCER (HCC): ICD-10-CM

## 2023-02-16 DIAGNOSIS — Z91.199 NONCOMPLIANCE: ICD-10-CM

## 2023-02-16 DIAGNOSIS — L97.919 IDIOPATHIC CHRONIC VENOUS HYPERTENSION OF RIGHT LOWER EXTREMITY WITH ULCER (HCC): ICD-10-CM

## 2023-02-16 DIAGNOSIS — I82.5Z1 CHRONIC VENOUS EMBOLISM AND THROMBOSIS OF DEEP VESSELS OF DISTAL END OF RIGHT LOWER EXTREMITY (HCC): Primary | ICD-10-CM

## 2023-02-16 LAB — INR BLD: 2.9

## 2023-02-16 PROCEDURE — 85610 PROTHROMBIN TIME: CPT | Performed by: PHARMACIST

## 2023-02-16 PROCEDURE — 29581 APPL MULTLAYER CMPRN SYS LEG: CPT

## 2023-02-16 PROCEDURE — 11042 DBRDMT SUBQ TIS 1ST 20SQCM/<: CPT

## 2023-02-16 PROCEDURE — 6370000000 HC RX 637 (ALT 250 FOR IP): Performed by: SPECIALIST

## 2023-02-16 PROCEDURE — 99211 OFF/OP EST MAY X REQ PHY/QHP: CPT | Performed by: PHARMACIST

## 2023-02-16 RX ORDER — BACITRACIN, NEOMYCIN, POLYMYXIN B 400; 3.5; 5 [USP'U]/G; MG/G; [USP'U]/G
OINTMENT TOPICAL ONCE
OUTPATIENT
Start: 2023-02-16 | End: 2023-02-16

## 2023-02-16 RX ORDER — BETAMETHASONE DIPROPIONATE 0.05 %
OINTMENT (GRAM) TOPICAL ONCE
OUTPATIENT
Start: 2023-02-16 | End: 2023-02-16

## 2023-02-16 RX ORDER — CLOBETASOL PROPIONATE 0.5 MG/G
OINTMENT TOPICAL ONCE
OUTPATIENT
Start: 2023-02-16 | End: 2023-02-16

## 2023-02-16 RX ORDER — LIDOCAINE 50 MG/G
OINTMENT TOPICAL ONCE
OUTPATIENT
Start: 2023-02-16 | End: 2023-02-16

## 2023-02-16 RX ORDER — GINSENG 100 MG
CAPSULE ORAL ONCE
OUTPATIENT
Start: 2023-02-16 | End: 2023-02-16

## 2023-02-16 RX ORDER — LIDOCAINE HYDROCHLORIDE 20 MG/ML
JELLY TOPICAL ONCE
OUTPATIENT
Start: 2023-02-16 | End: 2023-02-16

## 2023-02-16 RX ORDER — LIDOCAINE 40 MG/G
CREAM TOPICAL ONCE
OUTPATIENT
Start: 2023-02-16 | End: 2023-02-16

## 2023-02-16 RX ORDER — GENTAMICIN SULFATE 1 MG/G
OINTMENT TOPICAL ONCE
OUTPATIENT
Start: 2023-02-16 | End: 2023-02-16

## 2023-02-16 RX ORDER — BACITRACIN ZINC AND POLYMYXIN B SULFATE 500; 1000 [USP'U]/G; [USP'U]/G
OINTMENT TOPICAL ONCE
OUTPATIENT
Start: 2023-02-16 | End: 2023-02-16

## 2023-02-16 RX ORDER — LIDOCAINE HYDROCHLORIDE 40 MG/ML
SOLUTION TOPICAL ONCE
OUTPATIENT
Start: 2023-02-16 | End: 2023-02-16

## 2023-02-16 RX ORDER — LIDOCAINE HYDROCHLORIDE 40 MG/ML
SOLUTION TOPICAL ONCE
Status: COMPLETED | OUTPATIENT
Start: 2023-02-16 | End: 2023-02-16

## 2023-02-16 RX ADMIN — LIDOCAINE HYDROCHLORIDE: 40 SOLUTION TOPICAL at 08:45

## 2023-02-16 ASSESSMENT — PAIN DESCRIPTION - ORIENTATION: ORIENTATION: RIGHT

## 2023-02-16 ASSESSMENT — PAIN DESCRIPTION - FREQUENCY: FREQUENCY: CONTINUOUS

## 2023-02-16 ASSESSMENT — PAIN SCALES - GENERAL: PAINLEVEL_OUTOF10: 4

## 2023-02-16 ASSESSMENT — PAIN DESCRIPTION - PAIN TYPE: TYPE: CHRONIC PAIN

## 2023-02-16 ASSESSMENT — PAIN DESCRIPTION - LOCATION: LOCATION: LEG

## 2023-02-16 ASSESSMENT — PAIN DESCRIPTION - DESCRIPTORS: DESCRIPTORS: ACHING

## 2023-02-16 NOTE — PROGRESS NOTES
Multilayer Compression Wrap   (Not Unna) Below the Knee    NAME:  Matias Davis  YOB: 1965  MEDICAL RECORD NUMBER:  [id number]  DATE:  2/16/2023       Removed old Multilayer wrap if present and washed leg with mild soap/water. Applied moisturizing agent to dry skin as needed. Applied primary and secondary dressing as ordered    Applied multilayered dressing below the knee to Right lower leg(s)  (4 Layer Compression Wrap ) . Instructed patient/caregiver not to remove dressing and to keep it clean and dry. Instructed patient/caregiver on complications to report to provider, such as pain, numbness in toes, heavy drainage, and slippage of dressing. Instructed patient on purpose of compression dressing and on activity and exercise recommendations.    Applied per   Guidelines    Electronically signed by Minda Rushing RN on 2/16/2023 at 9:21 AM

## 2023-02-16 NOTE — PROGRESS NOTES
[facility]  Progress Note and Procedure Note      Mariam Olivarez  MEDICAL RECORD NUMBER:  [id number]  AGE: 62 y.o. GENDER: male  : 1965  EPISODE DATE:  2023    Subjective:     Chief Complaint   Patient presents with    Wound Check     RIGHT LOWER ;LEG         HISTORY of PRESENT ILLNESS HPI     Mariam Olivaerz is a 62 y.o. male who presents today for wound/ulcer evaluation. History of Wound Context: Continues follow-up for his chronic venous insufficiency with ulceration and lymphedema right lower extremity. He has been maintained in a 4 layer compression wrap this past week without difficulty. There has been no breakthrough drainage from the compression wrap. Also difficult to assess how frequently patient is in fact utilizing his lymphedema pumps.   Patient relates today that he is leaving for [place] for an extended period of time next week  Wound/Ulcer Pain Timing/Severity: none  Quality of pain: N/A  Severity:  0 / 10   Modifying Factors: None  Associated Signs/Symptoms: edema and drainage    Ulcer Identification:  Ulcer Type: venous    Contributing Factors: edema, venous stasis, lymphedema, diabetes, obesity, and anticoagulation therapy    Acute Wound: N/A not an acute wound    PAST MEDICAL HISTORY        Diagnosis Date    DVT (deep venous thrombosis) (HCC)     Hx of blood clots     Pain and swelling of right lower leg        PAST SURGICAL HISTORY    Past Surgical History:   Procedure Laterality Date    BALLOON ANGIOPLASTY, ARTERY Right 2017    at [facility], ESOPHAGUS      SKIN SPLIT GRAFT Right        FAMILY HISTORY    Family History   Problem Relation Age of Onset    Diabetes Mother     Heart Attack Father        SOCIAL HISTORY    Social History     Tobacco Use    Smoking status: Never    Smokeless tobacco: Never   Vaping Use    Vaping Use: Never used   Substance Use Topics    Alcohol use: No    Drug use: No       ALLERGIES    No Known Allergies    MEDICATIONS    Current Outpatient Medications on File Prior to Encounter   Medication Sig Dispense Refill    Doxycycline Hyclate 50 MG TABS Take 50 mg by mouth 2 times daily 60 tablet 0    warfarin (COUMADIN) 5 MG tablet TAKE 1 AND 1/2 TABLET BY MOUTH DAILY EXCEPT TAKE 2 TABLETS DAILY ON FRIDAY 44 tablet 0    ibuprofen (ADVIL;MOTRIN) 200 MG tablet Take 200 mg by mouth every 6 hours as needed for Pain (Patient not taking: Reported on 2/9/2023)      triamcinolone (KENALOG) 0.1 % ointment Apply topically 2 times daily (Patient not taking: No sig reported) 453.6 g 1    Compression Stockings MISC by Does not apply route Strength 30-40mmHg  Style: Other pull up compression stockings  Extremity: bilateral  Donning aid recommended: No 1 each 0     No current facility-administered medications on file prior to encounter. REVIEW OF SYSTEMS  Review of Systems    Pertinent items are noted in HPI. Objective:      BP (!) 188/65   Pulse 68   Temp 97.5 °F (36.4 °C) (Temporal)   Resp 16     Wt Readings from Last 3 Encounters:   02/02/23 249 lb 1.9 oz (113 kg)   04/25/22 251 lb 6.4 oz (114 kg)   04/18/22 251 lb 6.4 oz (114 kg)       PHYSICAL EXAM    General Appearance: alert and oriented to person, place and time, well-developed and well-nourished, in no acute distress  Extremities: no cyanosis, no clubbing, 3 + edema-  right lower extremity and 3 full-thickness ulcerations with granulation tissue minimal fibrin and minimal epithelialization at the margins of these ulcerations including right anterior right medial and right lateral knee. Periwound now showing only minimal maceration from right posterior ulcer. Assessment:      1. Chronic acquired lymphedema    2. Idiopathic chronic venous hypertension of right lower extremity with ulcer ([address]    3.  Noncompliance         Procedure Note  Indications:  Based on my examination of this patient's wound(s) today, sharp excision is required to promote healing and evaluate the extent healing. Performed by: Veronique Schumacher MD    Consent obtained? Yes    Time out taken: Yes    Pain Control: Anesthetic: 4% Lidocaine Liquid Topical     Debridement:Excisional Debridement    Using curette the wound was sharply debrided    down through and including the removal of  epidermis, dermis, and subcutaneous tissue. Devitalized Tissue Debrided:  fibrin and biofilm      Pre Debridement Measurements:  Are located in the Wound Documentation Flow Sheet   Wound #: 1, 2, and 3     Post  Debridement Measurements:  Wound 07/25/22 Leg Right; Lower;Medial #1 (Active)   Wound Image   02/02/23 0845   Wound Etiology Venous 07/25/22 1040   Wound Cleansed Cleansed with saline 02/16/23 0850   Dressing/Treatment Alginate with Ag 02/16/23 0902   Wound Length (cm) 2 cm 02/16/23 0850   Wound Width (cm) 2.6 cm 02/16/23 0850   Wound Depth (cm) 0.1 cm 02/16/23 0850   Wound Surface Area (cm^2) 5.2 cm^2 02/16/23 0850   Change in Wound Size % (l*w) 78.01 02/16/23 0850   Wound Volume (cm^3) 0.52 cm^3 02/16/23 0850   Wound Healing % 78 02/16/23 0850   Post-Procedure Length (cm) 2.1 cm 02/16/23 0902   Post-Procedure Width (cm) 2.7 cm 02/16/23 0902   Post-Procedure Depth (cm) 0.3 cm 02/16/23 0902   Post-Procedure Surface Area (cm^2) 5.67 cm^2 02/16/23 0902   Post-Procedure Volume (cm^3) 1.701 cm^3 02/16/23 0902   Distance Tunneling (cm) 0 cm 02/09/23 0855   Tunneling Position ___ O'Clock 0 12/22/22 1520   Undermining Starts ___ O'Clock 0 12/22/22 1520   Undermining Ends___ O'Clock 0 12/22/22 1520   Undermining Maxium Distance (cm) 0 02/09/23 0855   Wound Assessment Fibrin;Pink/red 02/16/23 0850   Drainage Amount Small 02/16/23 0850   Drainage Description Serosanguinous;Brown 02/16/23 0850   Odor None 02/16/23 0850   Kiah-wound Assessment Intact 02/16/23 0850   Margins Defined edges 02/16/23 0850   Wound Thickness Description not for Pressure Injury Full thickness 02/09/23 0855   Number of days: 205       Wound 07/25/22 Leg Right; Anterior; Lower #2 (Active)   Wound Image   02/02/23 0845   Wound Etiology Venous 07/25/22 1040   Wound Cleansed Cleansed with saline 02/16/23 0850   Dressing/Treatment Alginate with Ag 02/16/23 0902   Wound Length (cm) 1.4 cm 02/16/23 0850   Wound Width (cm) 5 cm 02/16/23 0850   Wound Depth (cm) 0.1 cm 02/16/23 0850   Wound Surface Area (cm^2) 7 cm^2 02/16/23 0850   Change in Wound Size % (l*w) 84.65 02/16/23 0850   Wound Volume (cm^3) 0.7 cm^3 02/16/23 0850   Wound Healing % 85 02/16/23 0850   Post-Procedure Length (cm) 1.5 cm 02/16/23 0902   Post-Procedure Width (cm) 5.1 cm 02/16/23 0902   Post-Procedure Depth (cm) 0.3 cm 02/16/23 0902   Post-Procedure Surface Area (cm^2) 7.65 cm^2 02/16/23 0902   Post-Procedure Volume (cm^3) 2.295 cm^3 02/16/23 0902   Distance Tunneling (cm) 0 cm 02/09/23 0855   Tunneling Position ___ O'Clock 0 12/22/22 1520   Undermining Starts ___ O'Clock 0 12/22/22 1520   Undermining Ends___ O'Clock 0 12/22/22 1520   Undermining Maxium Distance (cm) 0 02/09/23 0855   Wound Assessment Fibrin;Pink/red 02/16/23 0850   Drainage Amount Small 02/16/23 0850   Drainage Description Serosanguinous;Brown 02/16/23 0850   Odor None 02/16/23 0850   Kiah-wound Assessment Dry/flaky 02/16/23 0850   Margins Defined edges 02/16/23 0850   Wound Thickness Description not for Pressure Injury Full thickness 02/16/23 0850   Number of days: 205       Wound 07/25/22 Leg Right;Posterior; Lower #3 (Active)   Wound Image   02/02/23 0845   Wound Etiology Venous 07/25/22 1040   Wound Cleansed Cleansed with saline 02/16/23 0850   Dressing/Treatment Alginate with Ag 02/16/23 0902   Wound Length (cm) 3 cm 02/16/23 0850   Wound Width (cm) 1 cm 02/16/23 0850   Wound Depth (cm) 0.1 cm 02/16/23 0850   Wound Surface Area (cm^2) 3 cm^2 02/16/23 0850   Change in Wound Size % (l*w) 93.18 02/16/23 0850   Wound Volume (cm^3) 0.3 cm^3 02/16/23 0850   Wound Healing % 93 02/16/23 0850   Post-Procedure Length (cm) 3.1 cm 02/16/23 0902   Post-Procedure Width (cm) 1.1 cm 02/16/23 0902   Post-Procedure Depth (cm) 0.3 cm 02/16/23 0902   Post-Procedure Surface Area (cm^2) 3.41 cm^2 02/16/23 0902   Post-Procedure Volume (cm^3) 1.023 cm^3 02/16/23 0902   Distance Tunneling (cm) 0 cm 02/09/23 0855   Tunneling Position ___ O'Clock 0 12/22/22 1520   Undermining Starts ___ O'Clock 0 12/22/22 1520   Undermining Ends___ O'Clock 0 12/22/22 1520   Undermining Maxium Distance (cm) 0 02/09/23 0855   Wound Assessment Pink/red;Fibrin 02/16/23 0850   Drainage Amount Moderate 02/16/23 0850   Drainage Description Serosanguinous;Brown 02/16/23 0850   Odor Mild 02/16/23 0850   Kiah-wound Assessment Erosion; Maceration 02/16/23 0850   Margins Undefined edges 02/16/23 0850   Wound Thickness Description not for Pressure Injury Full thickness 02/16/23 0850   Number of days: 205          Percent of Wound Debrided: 100%    Total Surface Area Debrided:  16 sq cm    Diabetic/Pressure/Non Pressure Ulcers only:  Ulcer: Non-Pressure ulcer, fat layer exposed    Bleeding: Minimal    Hemostasis Achieved: by pressure    Procedural Pain: 0  / 10     Post Procedural Pain: 0 / 10     Response to treatment:  Well tolerated by patient. Continues slow movement in wound appearance. Unfortunately patient relates today that he is leaving for [place] in the next several days. It is questionable as to whether he will have appropriate supplies to take with him on such short notice        Plan:     Treatment Note: Please see attached Discharge Instructions. These instructions were given and signed by the patient or POA    New Prescriptions    No medications on file       Orders Placed This Encounter   Procedures    Initiate Outpatient Wound Care Protocol       Discharge Instructions          [facility] Physician Orders and Discharge Instructions  [address] [place] [address]. [place] [address]  Telephone: [phone]  NAME: Dora Davis  YOB: 1965  MEDICAL RECORD NUMBER:  [id number]  DATE: 2/16/23     Return Appointment:  Return Appointment: With Woody Simon MD  in  [facility])  [] Return Appointment for a Wound Assessment with the nurse on:     No future appointments. [address]: none  Medically necessary services: [] Skilled Nursing [] PT (Eval & Treat) [] OT (Eval & Treat) [] Social Work [] Dietician  [] Other:    Wound care instructions: If you smoke we ask that you refrain from smoking. Smoking inhibits wounds from healing. When taking antibiotics take the entire prescription as ordered. Do not stop taking until medication is all gone unless otherwise instructed. Exercise as tolerated. Keep weight off wounds and reposition every 2 hours if applicable. Do not get wounds wet in the bath or shower unless otherwise instructed by your physician. If your wound is on your foot or leg, you may purchase a cast bag. Please ask at the pharmacy. Wash hands with soap and water prior to and after every dressing change. [x]Wash wounds with:  0.9% normal saline  [x]Kiah wound Topical Treatments: Do not apply lotions, creams, or ointments to the skin around the wound bed unless directed as followed:   Apply around the wound: Zinc paste         [x]Wound Location: right lower leg wounds   Apply Primary Dressing to wound: Alginate with silver pad  Secondary Dressing: 4X4 gauze pad   Avoid contact of tape with skin if possible. When to change Dressing: DO NOT CHANGE for 1 week then remove on 02/23/23  dressing and apply silver alginate, gauze kerlix ace bandage- change 3xweek- Monday, Wednesday, friday            [x] Multilayer Compression Wrap:  Type: Applied on Right lower leg(s)  4 Layer Compression Wrap - patient to cut off wrap in 1 week  on 2/23/23 and use ace bandage from toes to below knee    Do not get leg(s) with wrap wet. If wraps become too tight call the center or completely remove the wrap. Elevate leg(s) above the level of the heart when sitting. Avoid prolonged standing in one place. Applied in Clinic on 2/16/2023  The Goal of this therapy is to reduce edema and get into long term compression garments to control venous insufficiency, lymphedema and reduce occurrence of venous ulcers    [x] Edema Control:  Apply: Compression Stocking on the Left leg  Apply every morning immediately when getting up. Remove every night before going to bed unless instructed otherwise     Elevate leg(s) above the level of the heart for 30 minutes 4-5 times a day and/or when sitting. Avoid prolonged standing in one place. [x] Lymphedema Therapy:  Wear Lymphedema pumps twice a day at settings prescribed by your physician. Avoid prolonged standing in one place. Supplies ordered: Bromium p:[phone] f: [phone]      Dietary:  Important dietary reminders:  1. Increase Protein intake (i.e. Lean meats, fish, eggs, legumes, and yogurt)  2. No added salt  3. If diabetic, follow a diabetic diet and check glucose prior to meals or as instructed by your physician. Dietary Supplements(Take twice a day unless instructed otherwise):  [] Lucien Labs  [] 30ml ProStat [] Angie Maliha [] Ensure Max/Premier [] Other:    Your nurse  is:  Courtney Henson     Electronically signed by Radha Ignacio RN on 2/16/2023 at 9:08 AM     [facility] Information: Should you experience any significant changes in your wound(s) or have questions about your wound care, please contact the [address] [place] [address] at [phone]. Hours of operation  Mon:  8AM - 2PM  Tue: 11AM - 5PM  Wed: CLOSED  Thur: 8AM - 4:30PM  Fri:  8AM - 4:30PM  The office is closed on all major holidays. Please give us 24-48 business hours to return your call. These hours of operation are subject to change.  If you need help with your wounds and cannot wait until we are available, contact your PCP or go to your preferred emergency room. Call your doctor now or seek immediate medical care if:    You have symptoms of infection, such as: Increased pain, swelling, warmth, or redness. Red streaks leading from the area. Pus draining from the area. A fever.          [] Patient unable to sign Discharge Instructions given to ECF/Transportation/POA         Electronically signed by Joseline Valencia MD on 2/16/2023 at 9:21 AM

## 2023-02-16 NOTE — PROGRESS NOTES
[address] [facility]:     [facility] [address] [facility] Socrates Ruano  R:[phone] f: [phone] [address] [place] [address] fax: [email or web address] [address]: The [address] Jessica Dsouza    Patient Information:      [address]   [phone]   : 1965  AGE: 62 y.o. GENDER: male   TODAYS DATE:  2023    Insurance:      PRIMARY INSURANCE:  Plan: [employer]  Coverage: [employer]  Effective Date: 2022  [id number] - (Commercial)    SECONDARY INSURANCE:  Plan:   Coverage:   Effective Date:   [unfilled]    [unfilled]   [unfilled]     Patient Wound Information:      1. Chronic acquired lymphedema    2. Idiopathic chronic venous hypertension of right lower extremity with ulcer ([address]    3. Noncompliance        WOUNDS REQUIRING DRESSING SUPPLIES:     Wound 22 Leg Right; Lower;Medial #1 (Active)   Wound Image   23 0845   Wound Etiology Venous 22 1040   Wound Cleansed Cleansed with saline 23 0850   Dressing/Treatment Foam impregnated with Ag 23 0915   Wound Length (cm) 2 cm 23 0850   Wound Width (cm) 2.6 cm 23 0850   Wound Depth (cm) 0.1 cm 23 0850   Wound Surface Area (cm^2) 5.2 cm^2 23 0850   Change in Wound Size % (l*w) 78.01 23 0850   Wound Volume (cm^3) 0.52 cm^3 23 0850   Wound Healing % 78 23 0850   Post-Procedure Length (cm) 2.1 cm 23 0902   Post-Procedure Width (cm) 2.7 cm 23 0902   Post-Procedure Depth (cm) 0.3 cm 23 0902   Post-Procedure Surface Area (cm^2) 5.67 cm^2 23 0902   Post-Procedure Volume (cm^3) 1.701 cm^3 23 0902   Distance Tunneling (cm) 0 cm 23 0855   Tunneling Position ___ O'Clock 0 22 1520   Undermining Starts ___ O'Clock 0 22 1520 Undermining Ends___ O'Clock 0 12/22/22 1520   Undermining Maxium Distance (cm) 0 02/09/23 0855   Wound Assessment Fibrin;Pink/red 02/16/23 0850   Drainage Amount Small 02/16/23 0850   Drainage Description Serosanguinous;Brown 02/16/23 0850   Odor None 02/16/23 0850   Kiah-wound Assessment Intact 02/16/23 0850   Margins Defined edges 02/16/23 0850   Wound Thickness Description not for Pressure Injury Full thickness 02/09/23 0855   Number of days: 205       Wound 07/25/22 Leg Right; Anterior; Lower #2 (Active)   Wound Image   02/02/23 0845   Wound Etiology Venous 07/25/22 1040   Wound Cleansed Cleansed with saline 02/16/23 0850   Dressing/Treatment Collagen with Ag 02/09/23 0915   Wound Length (cm) 1.4 cm 02/16/23 0850   Wound Width (cm) 5 cm 02/16/23 0850   Wound Depth (cm) 0.1 cm 02/16/23 0850   Wound Surface Area (cm^2) 7 cm^2 02/16/23 0850   Change in Wound Size % (l*w) 84.65 02/16/23 0850   Wound Volume (cm^3) 0.7 cm^3 02/16/23 0850   Wound Healing % 85 02/16/23 0850   Post-Procedure Length (cm) 1.5 cm 02/16/23 0902   Post-Procedure Width (cm) 5.1 cm 02/16/23 0902   Post-Procedure Depth (cm) 0.3 cm 02/16/23 0902   Post-Procedure Surface Area (cm^2) 7.65 cm^2 02/16/23 0902   Post-Procedure Volume (cm^3) 2.295 cm^3 02/16/23 0902   Distance Tunneling (cm) 0 cm 02/09/23 0855   Tunneling Position ___ O'Clock 0 12/22/22 1520   Undermining Starts ___ O'Clock 0 12/22/22 1520   Undermining Ends___ O'Clock 0 12/22/22 1520   Undermining Maxium Distance (cm) 0 02/09/23 0855   Wound Assessment Fibrin;Pink/red 02/16/23 0850   Drainage Amount Small 02/16/23 0850   Drainage Description Serosanguinous;Brown 02/16/23 0850   Odor None 02/16/23 0850   Kiah-wound Assessment Dry/flaky 02/16/23 0850   Margins Defined edges 02/16/23 0850   Wound Thickness Description not for Pressure Injury Full thickness 02/16/23 0850   Number of days: 205       Wound 07/25/22 Leg Right;Posterior; Lower #3 (Active)   Wound Image   02/02/23 0845   Wound Etiology Venous 07/25/22 1040   Wound Cleansed Cleansed with saline 02/16/23 0850   Dressing/Treatment Foam impregnated with Ag 02/09/23 0915   Wound Length (cm) 3 cm 02/16/23 0850   Wound Width (cm) 1 cm 02/16/23 0850   Wound Depth (cm) 0.1 cm 02/16/23 0850   Wound Surface Area (cm^2) 3 cm^2 02/16/23 0850   Change in Wound Size % (l*w) 93.18 02/16/23 0850   Wound Volume (cm^3) 0.3 cm^3 02/16/23 0850   Wound Healing % 93 02/16/23 0850   Post-Procedure Length (cm) 3.1 cm 02/16/23 0902   Post-Procedure Width (cm) 1.1 cm 02/16/23 0902   Post-Procedure Depth (cm) 0.3 cm 02/16/23 0902   Post-Procedure Surface Area (cm^2) 3.41 cm^2 02/16/23 0902   Post-Procedure Volume (cm^3) 1.023 cm^3 02/16/23 0902   Distance Tunneling (cm) 0 cm 02/09/23 0855   Tunneling Position ___ O'Clock 0 12/22/22 1520   Undermining Starts ___ O'Clock 0 12/22/22 1520   Undermining Ends___ O'Clock 0 12/22/22 1520   Undermining Maxium Distance (cm) 0 02/09/23 0855   Wound Assessment Pink/red;Fibrin 02/16/23 0850   Drainage Amount Moderate 02/16/23 0850   Drainage Description Serosanguinous;Brown 02/16/23 0850   Odor Mild 02/16/23 0850   Kiah-wound Assessment Erosion; Maceration 02/16/23 0850   Margins Undefined edges 02/16/23 0850   Wound Thickness Description not for Pressure Injury Full thickness 02/16/23 0850   Number of days: 205          Supplies Requested :      WOUND #: 1   PRIMARY DRESSING:  none   Cover and Secure with: 4X4 gauze pad  Bulky roll gauze     FREQUENCY OF DRESSING CHANGES:  Three times per week       WOUND #: 2   PRIMARY DRESSING:none     Cover and Secure with: 4X4 gauze pad  Bulky roll gauze     FREQUENCY OF DRESSING CHANGES:  Three times per week       WOUND #: 3   PRIMARY DRESSING:  Alginate with silver pad   Cover and Secure with: 4X4 gauze pad  Bulky roll gauze     FREQUENCY OF DRESSING CHANGES:  Three times per week     ADDITIONAL ITEMS:  [] Gloves Small  [] Gloves Medium [] Gloves Large [] Gloves XLarge  [] Tape 1\" [x] paperTape 2\" [] Tape 3\"  [] Medipore Tape  [x] Saline  [] Skin Prep   [] Adhesive Remover   [] Cotton Tip Applicators   [] Other:    Patient Wound(s) Debrided: [x] Yes   [] No    Debridement Date: 2/16/2023    Debribement Type: Excisional/Sharp    Patient currently being seen by Home Health: [] Yes   [x] No    Duration for needed supplies:  []15  [x]30  []60  []90 Days    Provider Information:      PROVIDER'S NAME/NPI: Marysol Nixon MD,  NPI: [id number]     I give permission to coordinate the care for this patient   assisting with verbal orders: Marilyn Burns RN 2/16/2023

## 2023-02-16 NOTE — DISCHARGE INSTRUCTIONS
215 [facility] Physician Orders and Discharge Instructions  302 [facility]   [address] [address] [facility]  Telephone: [phone]  NAME:  Kin Max  YOB: 1965  MEDICAL RECORD NUMBER:  [id number]  DATE: 2/16/23     Return Appointment:  Return Appointment: With Stacy Mcginnis MD  in  3 [facility])  [] Return Appointment for a Wound Assessment with the nurse on:     No future appointments. [address]: none  Medically necessary services: [] Skilled Nursing [] PT (Eval & Treat) [] OT (Eval & Treat) [] Social Work [] Dietician  [] Other:    Wound care instructions: If you smoke we ask that you refrain from smoking. Smoking inhibits wounds from healing. When taking antibiotics take the entire prescription as ordered. Do not stop taking until medication is all gone unless otherwise instructed. Exercise as tolerated. Keep weight off wounds and reposition every 2 hours if applicable. Do not get wounds wet in the bath or shower unless otherwise instructed by your physician. If your wound is on your foot or leg, you may purchase a cast bag. Please ask at the pharmacy. Wash hands with soap and water prior to and after every dressing change. [x]Wash wounds with:  0.9% normal saline  [x]Kiah wound Topical Treatments: Do not apply lotions, creams, or ointments to the skin around the wound bed unless directed as followed:   Apply around the wound: Zinc paste         [x]Wound Location: right lower leg wounds   Apply Primary Dressing to wound: Alginate with silver pad  Secondary Dressing: 4X4 gauze pad   Avoid contact of tape with skin if possible.   When to change Dressing: DO NOT CHANGE for 1 week then remove on 02/23/23  dressing and apply silver alginate, gauze kerlix ace bandage- change 3xweek- Monday, Wednesday, friday            [x] Multilayer Compression Wrap:  Type: Applied on Right lower leg(s)  4 Layer Compression Wrap - patient to cut off wrap in 1 week  on 2/23/23 and use ace bandage from toes to below knee    Do not get leg(s) with wrap wet. If wraps become too tight call the center or completely remove the wrap. Elevate leg(s) above the level of the heart when sitting. Avoid prolonged standing in one place. Applied in Clinic on 2/16/2023  The Goal of this therapy is to reduce edema and get into long term compression garments to control venous insufficiency, lymphedema and reduce occurrence of venous ulcers    [x] Edema Control:  Apply: Compression Stocking on the Left leg  Apply every morning immediately when getting up. Remove every night before going to bed unless instructed otherwise     Elevate leg(s) above the level of the heart for 30 minutes 4-5 times a day and/or when sitting. Avoid prolonged standing in one place. [x] Lymphedema Therapy:  Wear Lymphedema pumps twice a day at settings prescribed by your physician. Avoid prolonged standing in one place. Supplies ordered: [employer] p:[phone] f: [phone]      Dietary:  Important dietary reminders:  1. Increase Protein intake (i.e. Lean meats, fish, eggs, legumes, and yogurt)  2. No added salt  3. If diabetic, follow a diabetic diet and check glucose prior to meals or as instructed by your physician. Dietary Supplements(Take twice a day unless instructed otherwise):  [] Helayne Rye  [] 30ml ProStat [] [place] Ball [] Ensure Max/Premier [] Other:    Your nurse  is:  Imani Salcido     Electronically signed by Darrian Bean RN on 2/16/2023 at 9:08 AM     [address] [place] [address] Information: Should you experience any significant changes in your wound(s) or have questions about your wound care, please contact the [address] [place] [address] at [phone]. Hours of operation  Mon:  8AM - 2PM  Tue: 11AM - 5PM  Wed: CLOSED  Thur: 8AM - 4:30PM  Fri:  8AM - 4:30PM  The office is closed on all major holidays.     Please give us 24-48 business hours to return your call. These hours of operation are subject to change. If you need help with your wounds and cannot wait until we are available, contact your PCP or go to your preferred emergency room. Call your doctor now or seek immediate medical care if:    You have symptoms of infection, such as: Increased pain, swelling, warmth, or redness. Red streaks leading from the area. Pus draining from the area. A fever.          [] Patient unable to sign Discharge Instructions given to ECF/Transportation/POA

## 2023-02-16 NOTE — PROGRESS NOTES
ANTICOAGULATION SERVICE    Lidia Thakkar is a 62 y.o. male with PMHx significant for chronic DVT (last in Jan, 2019) who presents to clinic 2/16/2023 for anticoagulation monitoring and adjustment. Patient has been taking warfarin for 15+ years.      Anticoagulation Indication(s):  DVT    Referring Physician:  Dr. Kirstie Cabot  Goal INR Range:  2-3  Duration of Anticoagulation Therapy:  Indefinite  Time of day dose taken:  AM  Product patient has at home:  warfarin 5 mg (peach)    INR Summary                            Warfarin regimen (mg)  Date INR   A/P    Sun Mon Tue Wed Thu Fri Sat Mg/wk  2/16 2.9 At goal, continue  7.5 5 7.5 7.5 7.5 5 7.5 47.5  1/9 2.9 At goal, continue  7.5 5 7.5 7.5 7.5 5 7.5 47.5  12/5 2.5 At goal, continue  7.5 5 7.5 7.5 7.5 5 7.5 47.5  11/7 2.3 At goal, continue  7.5 5 7.5 7.5 7.5 5 7.5 47.5  10/10 2.9 At goal, continue  7.5 5 7.5 7.5 7.5 5 7.5 47.5  7/27 3.1 Above goal, continue    7.5 5 7.5 7.5 7.5 5 7.5 47.5  7/6 3.1 Above goal, decrease  7.5 5 7.5 7.5 7.5 5 7.5 47.5  6/22 2.4 At goal, resume prv dose 7.5 7.5 7.5 7.5 7.5 5 7.5 50  6/1 2.6 At goal, continue   7.5 5 7.5 5 7.5 5 7.5 45  5/18 4.1 Above goal, dec (abx)  7.5 5 7.5 5 7.5 5 7.5 45  4/6 2.8 At goal, continue  7.5 7.5 7.5 7.5 7.5 5 7.5 50  12/1 2.9 At goal, continue  7.5 7.5 7.5 7.5 7.5 5 7.5 50  11/10 2.4 At goal, continue   7.5 7.5 7.5 7.5 7.5 5 7.5 50  10/27 4.4 Above goal, hold+dec  7.5 7.5 7.5 7.5 0/7.5 5 7.5 50  8/25 2.9 At goal, continue  7.5 7.5 7.5 7.5 7.5 7.5 7.5 52.5  7/14 2.5 At goal, continue   7.5 7.5 7.5 7.5 7.5 7.5 7.5 52.5  6/14 2.9 At goal, continue  7.5 7.5 7.5 7.5 0/7.5 7.5 7.5 52.5  5/26 3.9 Above goal (ABX), hold x 1 7.5 7.5 7.5 7.5 0/7.5 7.5 7.5 52.5  3/31 2.5 At goal, no change  7.5 7.5 7.5 7.5 7.5 7.5 7.5 52.5  2/17 3.0 At goal, no change  7.5 7.5 7.5 7.5 7.5 7.5 7.5 52.5  1/6 2.8 At goal, no change  7.5 7.5 7.5 7.5 7.5 7.5 7.5 52.5  12/2 2.9 At goal, no change  7.5 7.5 7.5 7.5 7.5 7.5 7.5 52.5  11/4 3.1 Above goal, continue  7.5 7.5 7.5 7.5 7.5 7.5 7.5 52.5  10/7 2.7 At goal, no change  7.5 7.5 7.5 7.5 7.5 7.5 7.5 52.5  9/9 2.7 At goal, no change  7.5 7.5 7.5 7.5 7.5 7.5 7.5 52.5  8/17 2.8 At goal, no change  7.5 7.5 7.5 7.5 7.5 7.5 7.5 52.5  8/3 3.4 Above goal, decrease  7.5 7.5 7.5 7.5 7.5 7.5 7.5 52.5  6/8 2.9 At goal, no change  7.5 7.5 7.5 7.5 7.5 10 7.5 55  2/28 2.7 At goal, no change  7.5 7.5 7.5 7.5 7.5 10 7.5 55  1/3 2.6 At goal, no change  7.5 7.5 7.5 7.5 7.5 10 7.5 55  11/20 2.8 At goal, no change  7.5 7.5 7.5 7.5 7.5 10 7.5 55  10/23 2.6 At goal, no change  7.5 7.5 7.5 7.5 7.5 10 7.5 55  8/23 2.2 At goal, no change  7.5 7.5 7.5 7.5 7.5 10 7.5 55  7/26 2.5 At goal, no change  7.5 7.5 7.5 7.5 7.5 10 7.5 55  6/28 2.3 At goal, no change  7.5 7.5 7.5 7.5 7.5 10 7.5 55  5/31 2.6 At goal, no change  7.5 7.5 7.5 7.5 7.5 10 7.5 55  5/1 2.5 At goal, no change  7.5 7.5 7.5 7.5 7.5 10 7.5 55  4/10 2.0 At goal, no change  7.5 7.5 7.5 7.5 7.5 10 7.5 55  3/13 2.6 At goal, no change  7.5 7.5 7.5 7.5 7.5 10 7.5 55  2/27 2.7 At goal, no change  7.5 7.5 7.5 7.5 7.5 10 7.5 55  2/20 2.9 At goal, no change  7.5 7.5 7.5 7.5 7.5 10 7.5 55  2/13 2.1 At goal, no change  7.5 7.5 7.5 7.5 7.5 10 7.5 55    Last CBC:  Lab Results   Component Value Date    RBC 4.28 05/03/2022    HGB 10.8 (L) 05/03/2022    HCT 33.3 (L) 05/03/2022    MCV 77.7 (L) 05/03/2022    MCH 25.3 (L) 05/03/2022    MPV 6.5 05/03/2022    RDW 17.2 (H) 05/03/2022     05/03/2022     Patient History:  Recent hospitalizations/HC visits 5/9/22: podiatry continue cipro+clinda x6 more weeks  7/28/21 ID: patient will potentially need IV abx.  Continue following with wound care    Recent medication changes 5/2/22-current: ciprofloxacin 500 mg BID + clindamycin 300 mg TID (anticipated stop 6/20/22)    Medications taken regularly that may interact with warfarin or alter INR None reported   Warfarin dose taken as prescribed Yes - does not use pillbox (only takes warfarin) Signs/symptoms of bleeding No h/o bleeding reported   Vitamin K intake Normally avoids high vitamin K foods: ~0-1 serving per week   Recent vomiting/diarrhea/fever, changes in weight or activity level None reported   Tobacco or alcohol use Patient reports smoking 0 PPD  Patient reports having 0 drinks per day   Upcoming surgeries or procedures None reported     Assessment/Plan:  Patient's INR was therapeutic again today (2.9). Patient has been on doxycycline long term, but recently stopped for a couple of weeks and restarted. This drug can increase the INR, but does not appear to have affected his INR in the past. Patient denies any missed/extra warfarin doses, diet changes, illness, bleeding, etc since last visit. Patient was instructed to continue warfarin to 7.5 mg daily except 5 mg on Monday and Friday. Repeat INR in 4-5 weeks. Patient prefers to extend interval between appointments. But he is now seeing wound care every Monday and will try to coordinate appts. Patient was reminded to maintain consistent vitamin K intake and call with any bleeding, medication changes, or fever/vomiting/diarrhea. Patient understands dosing directions and information discussed. Dosing schedule and follow up appointment given to patient. Progress note routed to referring physician's office. Patient acknowledges working in consult agreement with pharmacist as referred by his/her physician. Next Clinic Appointment:  TBD, will schedule with next wound care appointment in 4-5 weeks    Please call [facility] at [phone] with any questions. Thanks!   Maryam Portillo - PharmD Candidate [id number]  [facility]  [place]: [address] [place] [address] Street: [phone]  2/16/2023 9:37 AM    For Pharmacy Admin Tracking Only  Time Spent (min): 15

## 2023-02-24 DIAGNOSIS — I82.5Z1 CHRONIC VENOUS EMBOLISM AND THROMBOSIS OF DEEP VESSELS OF DISTAL END OF RIGHT LOWER EXTREMITY (HCC): ICD-10-CM

## 2023-02-24 DIAGNOSIS — I82.423 THROMBOSIS OF BOTH ILIAC VEINS (HCC): ICD-10-CM

## 2023-02-24 DIAGNOSIS — I82.220 THROMBOSIS OF INFERIOR VENA CAVA (HCC): ICD-10-CM

## 2023-02-24 DIAGNOSIS — Z79.01 CHRONIC ANTICOAGULATION: ICD-10-CM

## 2023-02-24 RX ORDER — WARFARIN SODIUM 5 MG/1
TABLET ORAL
Qty: 44 TABLET | Refills: 0 | Status: SHIPPED | OUTPATIENT
Start: 2023-02-24

## 2023-04-22 NOTE — TELEPHONE ENCOUNTER
LVM #3 with patient to schedule INR check.      Osiris Mcclure, PharmD, [facility]  [facility]  [place]: [phone]  [facility]: [phone]  1/10/2022 5:42 PM Onset: 4/19/2023     Location / description: Patient states she was in the ER on Wednesday for vomiting and dizziness. Given nausea and dizziness medication. Patient states she still has dizziness even with the medication she was given. \"Dizzy spells.\"   Precipitating Factors: n/a  Pain Scale (1-10), 10 highest: 0/10  What improves/worsens symptoms: Worse when she is still up for a long period of time.  Symptom specific medications: Meclizine and Zofran. Zofran helps for nausea. Meclizine- does not help.  LMP : No LMP recorded. Patient has had a hysterectomy.   Are you pregnant or breast feeding: n/a  Recent visits (last 3-4 weeks) for same reason or recent surgery:  4/20/2023    PLAN:    See Provider within next few days    Patient/Caller agrees to follow recommendations.     Appointment made for Monday 4/24/2023 with Dr. Bell at 0845.     Instructed patient to call back or go to urgent care/ED if symptoms worsen in the next few days.     Reason for Disposition  • [1] MODERATE dizziness (e.g., vertigo; feels very unsteady, interferes with normal activities) AND [2] has been evaluated by physician for this    Protocols used: DIZZINESS - VERTIGO-A-

## 2023-04-28 ENCOUNTER — ANTI-COAG VISIT (OUTPATIENT)
Dept: PHARMACY | Age: 58
End: 2023-04-28
Payer: COMMERCIAL

## 2023-04-28 DIAGNOSIS — Z79.01 CHRONIC ANTICOAGULATION: ICD-10-CM

## 2023-04-28 DIAGNOSIS — I82.220 THROMBOSIS OF INFERIOR VENA CAVA (HCC): ICD-10-CM

## 2023-04-28 DIAGNOSIS — I82.5Z1 CHRONIC VENOUS EMBOLISM AND THROMBOSIS OF DEEP VESSELS OF DISTAL END OF RIGHT LOWER EXTREMITY (HCC): Primary | ICD-10-CM

## 2023-04-28 DIAGNOSIS — I82.423 THROMBOSIS OF BOTH ILIAC VEINS (HCC): ICD-10-CM

## 2023-04-28 LAB — INTERNATIONAL NORMALIZATION RATIO, POC: 3.2

## 2023-04-28 PROCEDURE — 85610 PROTHROMBIN TIME: CPT

## 2023-04-28 PROCEDURE — 99212 OFFICE O/P EST SF 10 MIN: CPT

## 2023-04-28 RX ORDER — WARFARIN SODIUM 5 MG/1
TABLET ORAL
Qty: 45 TABLET | Refills: 3 | Status: SHIPPED | OUTPATIENT
Start: 2023-04-28

## 2023-04-28 NOTE — PROGRESS NOTES
ANTICOAGULATION SERVICE    No Bernard is a 62 y.o. male with PMHx significant for chronic DVT (last in Jan, 2019) who presents to clinic 4/28/2023 for anticoagulation monitoring and adjustment. Patient has been taking warfarin for 15+ years.      Anticoagulation Indication(s):  DVT    Referring Physician:  Dr. Sonny Magdaleno  Goal INR Range:  2-3  Duration of Anticoagulation Therapy:  Indefinite  Time of day dose taken:  AM  Product patient has at home:  warfarin 5 mg (peach)    INR Summary                            Warfarin regimen (mg)  Date INR   A/P    Sun Mon Tue Wed Thu Fri Sat Mg/wk  4/28 3.2 Above goal, continue  7.5 5 7.5 7.5 7.5 5 7.5 47.5  2/16 2.9 At goal, continue  7.5 5 7.5 7.5 7.5 5 7.5 47.5  1/9 2.9 At goal, continue  7.5 5 7.5 7.5 7.5 5 7.5 47.5  12/5 2.5 At goal, continue  7.5 5 7.5 7.5 7.5 5 7.5 47.5  11/7 2.3 At goal, continue  7.5 5 7.5 7.5 7.5 5 7.5 47.5  10/10 2.9 At goal, continue  7.5 5 7.5 7.5 7.5 5 7.5 47.5  7/27 3.1 Above goal, continue    7.5 5 7.5 7.5 7.5 5 7.5 47.5  7/6 3.1 Above goal, decrease  7.5 5 7.5 7.5 7.5 5 7.5 47.5  6/22 2.4 At goal, resume prv dose 7.5 7.5 7.5 7.5 7.5 5 7.5 50  6/1 2.6 At goal, continue   7.5 5 7.5 5 7.5 5 7.5 45  5/18 4.1 Above goal, dec (abx)  7.5 5 7.5 5 7.5 5 7.5 45  4/6 2.8 At goal, continue  7.5 7.5 7.5 7.5 7.5 5 7.5 50  12/1 2.9 At goal, continue  7.5 7.5 7.5 7.5 7.5 5 7.5 50  11/10 2.4 At goal, continue   7.5 7.5 7.5 7.5 7.5 5 7.5 50  10/27 4.4 Above goal, hold+dec  7.5 7.5 7.5 7.5 0/7.5 5 7.5 50  8/25 2.9 At goal, continue  7.5 7.5 7.5 7.5 7.5 7.5 7.5 52.5  7/14 2.5 At goal, continue   7.5 7.5 7.5 7.5 7.5 7.5 7.5 52.5  6/14 2.9 At goal, continue  7.5 7.5 7.5 7.5 0/7.5 7.5 7.5 52.5  5/26 3.9 Above goal (ABX), hold x 1 7.5 7.5 7.5 7.5 0/7.5 7.5 7.5 52.5  3/31 2.5 At goal, no change  7.5 7.5 7.5 7.5 7.5 7.5 7.5 52.5  2/17 3.0 At goal, no change  7.5 7.5 7.5 7.5 7.5 7.5 7.5 52.5  1/6 2.8 At goal, no change  7.5 7.5 7.5 7.5 7.5 7.5 7.5 52.5  12/2 2.9 At goal, no

## 2023-05-26 ENCOUNTER — ANTI-COAG VISIT (OUTPATIENT)
Dept: PHARMACY | Age: 58
End: 2023-05-26
Payer: COMMERCIAL

## 2023-05-26 DIAGNOSIS — I82.5Z1 CHRONIC VENOUS EMBOLISM AND THROMBOSIS OF DEEP VESSELS OF DISTAL END OF RIGHT LOWER EXTREMITY (HCC): Primary | ICD-10-CM

## 2023-05-26 LAB — INTERNATIONAL NORMALIZATION RATIO, POC: 2.7

## 2023-05-26 PROCEDURE — 85610 PROTHROMBIN TIME: CPT

## 2023-05-26 PROCEDURE — 99211 OFF/OP EST MAY X REQ PHY/QHP: CPT

## 2023-05-26 NOTE — PROGRESS NOTES
Normally avoids high vitamin K foods: ~0-1 serving per week   Recent vomiting/diarrhea/fever, changes in weight or activity level None reported   Tobacco or alcohol use Patient reports smoking 0 PPD  Patient reports having 0 drinks per day   Upcoming surgeries or procedures None reported     Assessment/Plan:  Patient's INR was therapeutic today (2.7). Patient denies any missed/extra warfarin doses, diet changes, med changes, illness, bleeding, etc since last visit. Patient was instructed to continue warfarin 7.5 mg daily except 5 mg on Monday and Friday. Repeat INR in 6 weeks. Patient prefers to extend interval between appointments. Patient was reminded to maintain consistent vitamin K intake and call with any bleeding, medication changes, or fever/vomiting/diarrhea. Patient understands dosing directions and information discussed. Dosing schedule and follow up appointment given to patient. Progress note routed to referring physician's office. Patient acknowledges working in consult agreement with pharmacist as referred by his/her physician. Next Clinic Appointment:  7/7    Please call [facility] Anticoagulation Clinic at [phone] with any questions. Thanks! Toño Bates.  Mary Jane MadsenD, BCPS  [facility]  Ph: [phone]  5/26/2023 2:08 PM    For Pharmacy Admin Tracking Only  Time Spent (min): 15

## 2023-07-07 ENCOUNTER — ANTI-COAG VISIT (OUTPATIENT)
Dept: PHARMACY | Age: 58
End: 2023-07-07
Payer: COMMERCIAL

## 2023-07-07 DIAGNOSIS — I82.5Z1 CHRONIC VENOUS EMBOLISM AND THROMBOSIS OF DEEP VESSELS OF DISTAL END OF RIGHT LOWER EXTREMITY (HCC): Primary | ICD-10-CM

## 2023-07-07 LAB — INTERNATIONAL NORMALIZATION RATIO, POC: 2.4

## 2023-07-07 PROCEDURE — 99211 OFF/OP EST MAY X REQ PHY/QHP: CPT | Performed by: PHARMACIST

## 2023-07-07 PROCEDURE — 85610 PROTHROMBIN TIME: CPT | Performed by: PHARMACIST

## 2023-07-14 ENCOUNTER — OFFICE VISIT (OUTPATIENT)
Dept: FAMILY MEDICINE CLINIC | Age: 58
End: 2023-07-14

## 2023-07-14 VITALS
HEIGHT: 67 IN | DIASTOLIC BLOOD PRESSURE: 70 MMHG | TEMPERATURE: 98.2 F | HEART RATE: 82 BPM | BODY MASS INDEX: 39.24 KG/M2 | SYSTOLIC BLOOD PRESSURE: 130 MMHG | WEIGHT: 250 LBS | OXYGEN SATURATION: 97 %

## 2023-07-14 DIAGNOSIS — L97.909 VENOUS STASIS ULCER WITH VARICOSE VEINS, UNSPECIFIED SITE, UNSPECIFIED ULCER STAGE (HCC): Primary | ICD-10-CM

## 2023-07-14 DIAGNOSIS — I83.009 VENOUS STASIS ULCER WITH VARICOSE VEINS, UNSPECIFIED SITE, UNSPECIFIED ULCER STAGE (HCC): Primary | ICD-10-CM

## 2023-07-14 RX ORDER — DOXYCYCLINE HYCLATE 100 MG
100 TABLET ORAL 2 TIMES DAILY
Qty: 14 TABLET | Refills: 0 | Status: SHIPPED | OUTPATIENT
Start: 2023-07-14 | End: 2023-07-21

## 2023-07-14 SDOH — ECONOMIC STABILITY: INCOME INSECURITY: HOW HARD IS IT FOR YOU TO PAY FOR THE VERY BASICS LIKE FOOD, HOUSING, MEDICAL CARE, AND HEATING?: NOT HARD AT ALL

## 2023-07-14 SDOH — ECONOMIC STABILITY: FOOD INSECURITY: WITHIN THE PAST 12 MONTHS, YOU WORRIED THAT YOUR FOOD WOULD RUN OUT BEFORE YOU GOT MONEY TO BUY MORE.: NEVER TRUE

## 2023-07-14 SDOH — ECONOMIC STABILITY: FOOD INSECURITY: WITHIN THE PAST 12 MONTHS, THE FOOD YOU BOUGHT JUST DIDN'T LAST AND YOU DIDN'T HAVE MONEY TO GET MORE.: NEVER TRUE

## 2023-07-14 SDOH — ECONOMIC STABILITY: HOUSING INSECURITY
IN THE LAST 12 MONTHS, WAS THERE A TIME WHEN YOU DID NOT HAVE A STEADY PLACE TO SLEEP OR SLEPT IN A SHELTER (INCLUDING NOW)?: NO

## 2023-07-14 ASSESSMENT — PATIENT HEALTH QUESTIONNAIRE - PHQ9
SUM OF ALL RESPONSES TO PHQ QUESTIONS 1-9: 0
2. FEELING DOWN, DEPRESSED OR HOPELESS: 0
SUM OF ALL RESPONSES TO PHQ QUESTIONS 1-9: 0
1. LITTLE INTEREST OR PLEASURE IN DOING THINGS: 0
SUM OF ALL RESPONSES TO PHQ QUESTIONS 1-9: 0
SUM OF ALL RESPONSES TO PHQ9 QUESTIONS 1 & 2: 0
SUM OF ALL RESPONSES TO PHQ QUESTIONS 1-9: 0

## 2023-07-14 NOTE — PROGRESS NOTES
DME Order for [facility]) as OP    Other orders  -     doxycycline hyclate (VIBRA-TABS) 100 MG tablet; Take 1 tablet by mouth 2 times daily for 7 days           DISCHARGE MEDICATION LIST        Medication List            Accurate as of July 14, 2023 12:08 PM. If you have any questions, ask your nurse or doctor. START taking these medications      doxycycline hyclate 100 MG tablet  Commonly known as: VIBRA-TABS  Take 1 tablet by mouth 2 times daily for 7 days  Started by: Jeffery Lerma MD            CHANGE how you take these medications      triamcinolone 0.1 % ointment  Commonly known as: KENALOG  Apply topically 2 times daily  What changed:   when to take this  reasons to take this            CONTINUE taking these medications      Compression Stockings Misc  by Does not apply route Strength 30-40mmHg  Style: Other pull up compression stockings  Extremity: bilateral  Donning aid recommended: No     warfarin 5 MG tablet  Commonly known as: COUMADIN  Take as directed by the anticoagulation clinic. If you are unsure how to take this medication, talk to your nurse or doctor. Original instructions: TAKE 1 or 1 AND 1/2 TABLET BY MOUTH DAILY AS DIRECTED BY CLINIC               Where to Get Your Medications        These medications were sent to [facility] [id number] [facility] [address], [facility] [address]      Phone: [phone]   doxycycline hyclate 100 MG tablet          Return if symptoms worsen or fail to improve. Please refer to diagnosis, orders and patient instructions for further recommendations given. All patient's questions and concerns were addressed appropriately. All orders, handouts were reviewed in detail with the patient and patient voiced understanding verbally.

## 2023-07-24 ENCOUNTER — HOSPITAL ENCOUNTER (OUTPATIENT)
Dept: WOUND CARE | Age: 58
Discharge: HOME | End: 2023-07-24
Payer: COMMERCIAL

## 2023-07-24 VITALS
DIASTOLIC BLOOD PRESSURE: 90 MMHG | RESPIRATION RATE: 16 BRPM | TEMPERATURE: 97.7 F | SYSTOLIC BLOOD PRESSURE: 135 MMHG | WEIGHT: 252.65 LBS | BODY MASS INDEX: 39.57 KG/M2 | HEART RATE: 85 BPM

## 2023-07-24 DIAGNOSIS — L97.909 VENOUS ULCER (HCC): ICD-10-CM

## 2023-07-24 DIAGNOSIS — I89.0 LYMPHEDEMA: ICD-10-CM

## 2023-07-24 DIAGNOSIS — I89.0 CHRONIC ACQUIRED LYMPHEDEMA: Primary | ICD-10-CM

## 2023-07-24 DIAGNOSIS — I83.009 VENOUS ULCER (HCC): ICD-10-CM

## 2023-07-24 DIAGNOSIS — I87.311 IDIOPATHIC CHRONIC VENOUS HYPERTENSION OF RIGHT LOWER EXTREMITY WITH ULCER (HCC): ICD-10-CM

## 2023-07-24 DIAGNOSIS — Z91.199 NONCOMPLIANCE: ICD-10-CM

## 2023-07-24 DIAGNOSIS — L97.919 IDIOPATHIC CHRONIC VENOUS HYPERTENSION OF RIGHT LOWER EXTREMITY WITH ULCER (HCC): ICD-10-CM

## 2023-07-24 PROCEDURE — 87205 SMEAR GRAM STAIN: CPT

## 2023-07-24 PROCEDURE — 87186 SC STD MICRODIL/AGAR DIL: CPT

## 2023-07-24 PROCEDURE — 87077 CULTURE AEROBIC IDENTIFY: CPT

## 2023-07-24 PROCEDURE — 11045 DBRDMT SUBQ TISS EACH ADDL: CPT

## 2023-07-24 PROCEDURE — 11045 DBRDMT SUBQ TISS EACH ADDL: CPT | Performed by: SPECIALIST

## 2023-07-24 PROCEDURE — 99202 OFFICE O/P NEW SF 15 MIN: CPT | Performed by: SPECIALIST

## 2023-07-24 PROCEDURE — 11042 DBRDMT SUBQ TIS 1ST 20SQCM/<: CPT

## 2023-07-24 PROCEDURE — 11042 DBRDMT SUBQ TIS 1ST 20SQCM/<: CPT | Performed by: SPECIALIST

## 2023-07-24 PROCEDURE — 87176 TISSUE HOMOGENIZATION CULTR: CPT

## 2023-07-24 PROCEDURE — 87181 SC STD AGAR DILUTION PER AGT: CPT

## 2023-07-24 PROCEDURE — 99213 OFFICE O/P EST LOW 20 MIN: CPT

## 2023-07-24 PROCEDURE — 87070 CULTURE OTHR SPECIMN AEROBIC: CPT

## 2023-07-24 RX ORDER — LIDOCAINE HYDROCHLORIDE 20 MG/ML
JELLY TOPICAL ONCE
OUTPATIENT
Start: 2023-07-24 | End: 2023-07-24

## 2023-07-24 RX ORDER — SODIUM CHLOR/HYPOCHLOROUS ACID 0.033 %
SOLUTION, IRRIGATION IRRIGATION ONCE
OUTPATIENT
Start: 2023-07-24 | End: 2023-07-24

## 2023-07-24 RX ORDER — BACITRACIN ZINC AND POLYMYXIN B SULFATE 500; 1000 [USP'U]/G; [USP'U]/G
OINTMENT TOPICAL ONCE
OUTPATIENT
Start: 2023-07-24 | End: 2023-07-24

## 2023-07-24 RX ORDER — LIDOCAINE 40 MG/G
CREAM TOPICAL ONCE
OUTPATIENT
Start: 2023-07-24 | End: 2023-07-24

## 2023-07-24 RX ORDER — GINSENG 100 MG
CAPSULE ORAL ONCE
OUTPATIENT
Start: 2023-07-24 | End: 2023-07-24

## 2023-07-24 RX ORDER — LIDOCAINE HYDROCHLORIDE 40 MG/ML
SOLUTION TOPICAL ONCE
OUTPATIENT
Start: 2023-07-24 | End: 2023-07-24

## 2023-07-24 RX ORDER — CLOBETASOL PROPIONATE 0.5 MG/G
OINTMENT TOPICAL ONCE
OUTPATIENT
Start: 2023-07-24 | End: 2023-07-24

## 2023-07-24 RX ORDER — IBUPROFEN 200 MG
TABLET ORAL ONCE
OUTPATIENT
Start: 2023-07-24 | End: 2023-07-24

## 2023-07-24 RX ORDER — GENTAMICIN SULFATE 1 MG/G
OINTMENT TOPICAL ONCE
OUTPATIENT
Start: 2023-07-24 | End: 2023-07-24

## 2023-07-24 RX ORDER — LIDOCAINE 50 MG/G
OINTMENT TOPICAL ONCE
OUTPATIENT
Start: 2023-07-24 | End: 2023-07-24

## 2023-07-24 RX ORDER — BETAMETHASONE DIPROPIONATE 0.05 %
OINTMENT (GRAM) TOPICAL ONCE
OUTPATIENT
Start: 2023-07-24 | End: 2023-07-24

## 2023-07-24 ASSESSMENT — PAIN DESCRIPTION - FREQUENCY: FREQUENCY: CONTINUOUS

## 2023-07-24 ASSESSMENT — PAIN DESCRIPTION - DESCRIPTORS: DESCRIPTORS: BURNING

## 2023-07-24 ASSESSMENT — PAIN DESCRIPTION - LOCATION: LOCATION: LEG

## 2023-07-24 ASSESSMENT — PAIN SCALES - GENERAL: PAINLEVEL_OUTOF10: 9

## 2023-07-24 ASSESSMENT — PAIN DESCRIPTION - ORIENTATION: ORIENTATION: OUTER

## 2023-07-24 ASSESSMENT — PAIN DESCRIPTION - PAIN TYPE: TYPE: CHRONIC PAIN

## 2023-07-24 NOTE — DISCHARGE INSTRUCTIONS
[facility] Physician Orders and Discharge Instructions  [address]  Telephone: [phone]  NAME:  Jose Buckley  YOB: 1965  MEDICAL RECORD NUMBER:  [id number]  DATE: 7/24/23     Return Appointment:  Return Appointment: With Natalie Miranda MD  in  1 [facility])  [x] Return Appointment for a Wound Assessment with the nurse on: 07/28/23    Future Appointments   Date Time Provider [address]   8/18/2023  1:30 PM [facility] [facility]: none    Medically necessary services for evaluation and treatment: []Skilled Nursing (using clean technique) []PT (Eval & Treat) []OT (Eval & Treat) []Social Work []Dietician []Other:      Wound care instructions: If you smoke we ask that you refrain from smoking. Smoking inhibits wounds from healing. When taking antibiotics take the entire prescription as ordered. Do not stop taking until medication is all gone unless otherwise instructed. Exercise as tolerated. Keep weight off wounds and reposition every 2 hours if applicable. Do not get wounds wet in the bath or shower unless otherwise instructed by your physician. If your wound is on your foot or leg, you may purchase a cast bag. Please ask at the pharmacy. Wash hands with soap and water prior to and after every dressing change. [x]Wash wounds with: Vashe wash - Apply enough Vashe to soak a piece of gauze and place on wound bed for 5-10 minutes. No need to rinse after soaking. [x]Kiah wound Topical Treatments: Do not apply lotions, creams, or ointments to the skin around the wound bed unless directed as followed:   Apply around the wound: Zinc paste         [x]Wound Location: right lower leg   Apply Primary Dressing to wound: Hydrofiber with silver (ie.  Opticell Ag, Aquacel Ag)  Secondary Dressing: Extra absorbant pad   Avoid contact of tape with skin if

## 2023-07-24 NOTE — PROGRESS NOTES
Multilayer Compression Wrap   (Not Unna) Below the Knee    NAME:  Abhishek Domínguez  YOB: 1965  MEDICAL RECORD NUMBER:  [id number]  DATE:  7/24/2023       Removed old Multilayer wrap if present and washed leg with mild soap/water. Applied moisturizing agent to dry skin as needed. Applied primary and secondary dressing as ordered    Applied multilayered dressing below the knee to Right lower leg(s)  (4 Layer Compression Wrap ) . Instructed patient/caregiver not to remove dressing and to keep it clean and dry. Instructed patient/caregiver on complications to report to provider, such as pain, numbness in toes, heavy drainage, and slippage of dressing. Instructed patient on purpose of compression dressing and on activity and exercise recommendations.    Applied per   Guidelines    Electronically signed by Lorelei Chilel RN on 7/24/2023 at 11:00 AM
wound(s) or have questions about your wound care, please contact the [facility] at [phone]. Hours of operation:  Mon:  8AM - 2PM  Tue: 11AM - 5PM  Wed: CLOSED  Thur: 8AM - 4:30PM  Fri:  8AM - 4:30PM  The office is closed on all major holidays. Please give us 24-48 business hours to return your call. These hours of operation are subject to change. If you need help with your wounds and cannot wait until we are available, contact your PCP or go to your preferred emergency room. Call your doctor now or seek immediate medical care if:    You have symptoms of infection, such as: Increased pain, swelling, warmth, or redness. Red streaks leading from the area. Pus draining from the area. A fever.          [] Patient unable to sign Discharge Instructions given to ECF/Transportation/POA         Electronically signed by Gelacio Stuart MD on 7/24/2023 at 10:00 AM

## 2023-07-27 LAB
BACTERIA SPEC AEROBE CULT: ABNORMAL
BACTERIA SPEC ANAEROBE CULT: ABNORMAL
GRAM STN SPEC: ABNORMAL
ORGANISM: ABNORMAL

## 2023-07-28 ENCOUNTER — HOSPITAL ENCOUNTER (OUTPATIENT)
Dept: WOUND CARE | Age: 58
Discharge: HOME | End: 2023-07-28
Payer: COMMERCIAL

## 2023-07-28 VITALS
TEMPERATURE: 98.4 F | DIASTOLIC BLOOD PRESSURE: 80 MMHG | RESPIRATION RATE: 18 BRPM | SYSTOLIC BLOOD PRESSURE: 131 MMHG | HEART RATE: 84 BPM

## 2023-07-28 PROCEDURE — 29581 APPL MULTLAYER CMPRN SYS LEG: CPT

## 2023-07-28 RX ORDER — IBUPROFEN 200 MG
200 TABLET ORAL EVERY 6 HOURS PRN
COMMUNITY

## 2023-07-28 ASSESSMENT — PAIN DESCRIPTION - PAIN TYPE: TYPE: ACUTE PAIN

## 2023-07-28 ASSESSMENT — PAIN DESCRIPTION - ORIENTATION: ORIENTATION: RIGHT

## 2023-07-28 ASSESSMENT — PAIN DESCRIPTION - LOCATION: LOCATION: LEG

## 2023-07-28 ASSESSMENT — PAIN SCALES - GENERAL: PAINLEVEL_OUTOF10: 8

## 2023-07-28 ASSESSMENT — PAIN DESCRIPTION - DESCRIPTORS: DESCRIPTORS: BURNING

## 2023-07-28 NOTE — DISCHARGE INSTRUCTIONS
411 [facility] Physician Orders and Discharge Instructions  The [address] [place] [address] Road [facility] [address], [address] [place] [address] East,[address]  Telephone: [phone]    NAME:  Sandhya Soto:  1965  MEDICAL RECORD NUMBER:  [id number]  DATE:  7/28/2023      Wound care:  Continue to follow the instructions and recommendations from your last doctor visit. The dressing(s) applied is the same as your last visit. Please refer to your last discharge instruction for the information on your wound care. If there were any changes made, please follow the instructions as written here: no changes in wound care but patient advised by Dr. Kymberly Lomax to go to ED because of increased redness to sofya wound, copious amount of brown, yellow foul smelling drainage. Future Appointments     Future Appointments   Date Time Provider [facility] [address]   7/31/2023  9:15 AM Ilia Torres  [facility]   8/18/2023  1:30 PM [facility] MEDICATION MANAGEMENT [address] [place] [address]           Your nurse  is:  Kat Adams     Electronically signed by Harmony Iyer RN on 7/28/2023 at 207 [facility] Information: Should you experience any significant changes in your wound(s) or have questions about your wound care, please contact the [place] at [phone]. Our hours vary so please leave a message. Please give us 24-48 hours to return your call. If you need help with your wounds and cannot wait until we are available, contact your PCP or go to the hospital emergency room. Physician orders by:  Dr. Kayden Murillo        The information contained in the After Visit Summary has been reviewed with me, the patient and/or responsible adult, by my health care provider(s). I had the opportunity to ask questions regarding this information. I have elected to receive;      [] Patient unable to sign Discharge Instructions.  Given to

## 2023-07-28 NOTE — PROGRESS NOTES
Patient seen for nurse visit today and right lower leg wounds with copious amount of foul smelling greenish/brown drainage. Wounds with brownish yellow slough. Patient states pain burning 8/10. Kiah wound red. Wound culture back and Dr. Margie Kemp aware of results and was going to discuss with patient on Monday. I advised patient to go to ED because of above assessment. Dr. Naomie Campos looked at leg wounds and advised patient to go to ED, but patient states he has bills to pay and needs to work.

## 2023-07-31 ENCOUNTER — CLINICAL DOCUMENTATION (OUTPATIENT)
Dept: INFECTIOUS DISEASES | Age: 58
End: 2023-07-31

## 2023-07-31 ENCOUNTER — HOSPITAL ENCOUNTER (OUTPATIENT)
Dept: WOUND CARE | Age: 58
Discharge: HOME | End: 2023-07-31
Payer: COMMERCIAL

## 2023-07-31 ENCOUNTER — TELEPHONE (OUTPATIENT)
Dept: INFECTIOUS DISEASES | Age: 58
End: 2023-07-31

## 2023-07-31 VITALS
HEART RATE: 84 BPM | SYSTOLIC BLOOD PRESSURE: 146 MMHG | DIASTOLIC BLOOD PRESSURE: 82 MMHG | RESPIRATION RATE: 20 BRPM | TEMPERATURE: 96.8 F

## 2023-07-31 DIAGNOSIS — L97.919 LEG ULCER, RIGHT, WITH UNSPECIFIED SEVERITY (HCC): Primary | ICD-10-CM

## 2023-07-31 DIAGNOSIS — L97.919 LEG ULCER, RIGHT, WITH UNSPECIFIED SEVERITY (HCC): ICD-10-CM

## 2023-07-31 DIAGNOSIS — L97.909 VENOUS ULCER (HCC): ICD-10-CM

## 2023-07-31 DIAGNOSIS — Z91.199 NONCOMPLIANCE: Primary | ICD-10-CM

## 2023-07-31 DIAGNOSIS — L97.919 IDIOPATHIC CHRONIC VENOUS HYPERTENSION OF RIGHT LOWER EXTREMITY WITH ULCER (HCC): ICD-10-CM

## 2023-07-31 DIAGNOSIS — I89.0 CHRONIC ACQUIRED LYMPHEDEMA: ICD-10-CM

## 2023-07-31 DIAGNOSIS — I83.009 VENOUS ULCER (HCC): ICD-10-CM

## 2023-07-31 DIAGNOSIS — I89.0 LYMPHEDEMA: ICD-10-CM

## 2023-07-31 DIAGNOSIS — I87.311 IDIOPATHIC CHRONIC VENOUS HYPERTENSION OF RIGHT LOWER EXTREMITY WITH ULCER (HCC): ICD-10-CM

## 2023-07-31 DIAGNOSIS — A49.8 PSEUDOMONAS INFECTION: Primary | ICD-10-CM

## 2023-07-31 PROCEDURE — 11045 DBRDMT SUBQ TISS EACH ADDL: CPT | Performed by: SPECIALIST

## 2023-07-31 PROCEDURE — 11042 DBRDMT SUBQ TIS 1ST 20SQCM/<: CPT | Performed by: SPECIALIST

## 2023-07-31 PROCEDURE — 29581 APPL MULTLAYER CMPRN SYS LEG: CPT

## 2023-07-31 PROCEDURE — 11045 DBRDMT SUBQ TISS EACH ADDL: CPT

## 2023-07-31 PROCEDURE — 11042 DBRDMT SUBQ TIS 1ST 20SQCM/<: CPT

## 2023-07-31 PROCEDURE — 6370000000 HC RX 637 (ALT 250 FOR IP): Performed by: SPECIALIST

## 2023-07-31 RX ORDER — LIDOCAINE HYDROCHLORIDE 20 MG/ML
JELLY TOPICAL ONCE
OUTPATIENT
Start: 2023-07-31 | End: 2023-07-31

## 2023-07-31 RX ORDER — GENTAMICIN SULFATE 1 MG/G
OINTMENT TOPICAL ONCE
Status: COMPLETED | OUTPATIENT
Start: 2023-07-31 | End: 2023-07-31

## 2023-07-31 RX ORDER — CLOBETASOL PROPIONATE 0.5 MG/G
OINTMENT TOPICAL ONCE
OUTPATIENT
Start: 2023-07-31 | End: 2023-07-31

## 2023-07-31 RX ORDER — LIDOCAINE HYDROCHLORIDE 40 MG/ML
SOLUTION TOPICAL ONCE
OUTPATIENT
Start: 2023-07-31 | End: 2023-07-31

## 2023-07-31 RX ORDER — GINSENG 100 MG
CAPSULE ORAL ONCE
OUTPATIENT
Start: 2023-07-31 | End: 2023-07-31

## 2023-07-31 RX ORDER — GENTAMICIN SULFATE 1 MG/G
OINTMENT TOPICAL ONCE
OUTPATIENT
Start: 2023-07-31 | End: 2023-07-31

## 2023-07-31 RX ORDER — LIDOCAINE 50 MG/G
OINTMENT TOPICAL ONCE
OUTPATIENT
Start: 2023-07-31 | End: 2023-07-31

## 2023-07-31 RX ORDER — IBUPROFEN 200 MG
TABLET ORAL ONCE
OUTPATIENT
Start: 2023-07-31 | End: 2023-07-31

## 2023-07-31 RX ORDER — SODIUM CHLOR/HYPOCHLOROUS ACID 0.033 %
SOLUTION, IRRIGATION IRRIGATION ONCE
OUTPATIENT
Start: 2023-07-31 | End: 2023-07-31

## 2023-07-31 RX ORDER — BETAMETHASONE DIPROPIONATE 0.05 %
OINTMENT (GRAM) TOPICAL ONCE
OUTPATIENT
Start: 2023-07-31 | End: 2023-07-31

## 2023-07-31 RX ORDER — BACITRACIN ZINC AND POLYMYXIN B SULFATE 500; 1000 [USP'U]/G; [USP'U]/G
OINTMENT TOPICAL ONCE
OUTPATIENT
Start: 2023-07-31 | End: 2023-07-31

## 2023-07-31 RX ORDER — LIDOCAINE 40 MG/G
CREAM TOPICAL ONCE
OUTPATIENT
Start: 2023-07-31 | End: 2023-07-31

## 2023-07-31 RX ORDER — LIDOCAINE HYDROCHLORIDE 40 MG/ML
SOLUTION TOPICAL ONCE
Status: COMPLETED | OUTPATIENT
Start: 2023-07-31 | End: 2023-07-31

## 2023-07-31 RX ADMIN — GENTAMICIN SULFATE: 1 OINTMENT TOPICAL at 10:27

## 2023-07-31 RX ADMIN — LIDOCAINE HYDROCHLORIDE: 40 SOLUTION TOPICAL at 09:55

## 2023-07-31 ASSESSMENT — PAIN DESCRIPTION - LOCATION: LOCATION: LEG

## 2023-07-31 ASSESSMENT — PAIN SCALES - GENERAL: PAINLEVEL_OUTOF10: 9

## 2023-07-31 ASSESSMENT — PAIN - FUNCTIONAL ASSESSMENT: PAIN_FUNCTIONAL_ASSESSMENT: PREVENTS OR INTERFERES SOME ACTIVE ACTIVITIES AND ADLS

## 2023-07-31 ASSESSMENT — PAIN DESCRIPTION - DESCRIPTORS: DESCRIPTORS: BURNING

## 2023-07-31 ASSESSMENT — PAIN DESCRIPTION - ORIENTATION: ORIENTATION: RIGHT

## 2023-07-31 NOTE — PLAN OF CARE
-13.95 07/31/23 0934   Wound Volume (cm^3) 2.695 cm^3 07/31/23 0934   Wound Healing % -14 07/31/23 0934   Post-Procedure Length (cm) 5 cm 07/31/23 1007   Post-Procedure Width (cm) 5.6 cm 07/31/23 1007   Post-Procedure Depth (cm) 0.3 cm 07/31/23 1007   Post-Procedure Surface Area (cm^2) 28 cm^2 07/31/23 1007   Post-Procedure Volume (cm^3) 8.4 cm^3 07/31/23 1007   Distance Tunneling (cm) 0 cm 07/31/23 0934   Tunneling Position ___ O'Clock 0 12/22/22 1520   Undermining Starts ___ O'Clock 0 12/22/22 1520   Undermining Ends___ O'Clock 0 12/22/22 1520   Undermining Maxium Distance (cm) 0 07/31/23 0934   Wound Assessment Devitalized tissue;Slough 07/31/23 0934   Drainage Amount Large 07/31/23 0934   Drainage Description Green;Brown 07/31/23 0934   Odor Moderate 07/31/23 0934   Kiah-wound Assessment Edematous; Excoriated;Fragile;Blanchable erythema 07/31/23 0934   Margins Defined edges 07/31/23 0934   Wound Thickness Description not for Pressure Injury Full thickness 07/28/23 1337   Number of days: 370       Wound 07/25/22 Leg Right; Anterior; Lower; Lateral #2 (Active)   Wound Image   07/24/23 0906   Wound Etiology Venous 07/25/22 1040   Wound Cleansed Cleansed with saline 07/31/23 0934   Dressing/Treatment Alginate with Ag 07/28/23 1337   Wound Length (cm) 10 cm 07/31/23 0934   Wound Width (cm) 18 cm 07/31/23 0934   Wound Depth (cm) 0.1 cm 07/31/23 0934   Wound Surface Area (cm^2) 180 cm^2 07/31/23 0934   Change in Wound Size % (l*w) -294.74 07/31/23 0934   Wound Volume (cm^3) 18 cm^3 07/31/23 0934   Wound Healing % -295 07/31/23 0934   Post-Procedure Length (cm) 10.1 cm 07/31/23 1007   Post-Procedure Width (cm) 18.1 cm 07/31/23 1007   Post-Procedure Depth (cm) 0.3 cm 07/31/23 1007   Post-Procedure Surface Area (cm^2) 182.81 cm^2 07/31/23 1007   Post-Procedure Volume (cm^3) 54.843 cm^3 07/31/23 1007   Distance Tunneling (cm) 0 cm 07/31/23 0934   Tunneling Position ___ O'Clock 0 07/28/23 1337   Undermining Starts ___ O'Clock 0

## 2023-07-31 NOTE — TELEPHONE ENCOUNTER
Called patient to schedule follow up appointment with Dr. Mina Miller. Spoke with Dr. Sandra Rendon and he said that he was fine to wait until Mina Miller got back to the office.     Called patient and LMTCB

## 2023-07-31 NOTE — TELEPHONE ENCOUNTER
Pt is scheduled with Dr Anuradha Murillo on 8/30/23  Pt is being started on IV ATB on 8/2/23  Pt is scheduled for PICC placement on 8/2/23 at 1300 with an arrival time of 1230  I will meet pt here at that time to educate on OPAT  Pt is aware of appointments  Pt is on coumadin and INR will be checked prior to PICC placement  As long as INR is less than 3 PICC will be placed  Last INR was 2.4 on 7/7  Pt will receive IV ATB from [facility] [place]  Unable to find [facility] [address] to accept pt insurance  This RN will set up weekly lab draws and PICC care/dressing changes with [facility] 200

## 2023-07-31 NOTE — DISCHARGE INSTRUCTIONS
all major holidays. Please give us 24-48 business hours to return your call. These hours of operation are subject to change. If you need help with your wounds and cannot wait until we are available, contact your PCP or go to your preferred emergency room. Call your doctor now or seek immediate medical care if:    You have symptoms of infection, such as: Increased pain, swelling, warmth, or redness. Red streaks leading from the area. Pus draining from the area. A fever.          [] Patient unable to sign Discharge Instructions given to ECF/Transportation/POA

## 2023-07-31 NOTE — PROGRESS NOTES
Multilayer Compression Wrap   (Not Unna) Below the Knee    NAME:  Mariana [place]  YOB: 1965  MEDICAL RECORD NUMBER:  [id number]  DATE:  7/31/2023       Removed old Multilayer wrap if present and washed leg with mild soap/water. Applied moisturizing agent to dry skin as needed. Applied primary and secondary dressing as ordered    Applied multilayered dressing below the knee to Right lower leg(s)  (4 Layer Compression Wrap ) . Instructed patient/caregiver not to remove dressing and to keep it clean and dry. Instructed patient/caregiver on complications to report to provider, such as pain, numbness in toes, heavy drainage, and slippage of dressing. Instructed patient on purpose of compression dressing and on activity and exercise recommendations.    Applied per   Guidelines    Electronically signed by Lenny Ornelas RN on 7/31/2023 at 10:26 AM
Idiopathic chronic venous hypertension of right lower extremity with ulcer ([address])    4. Lymphedema    5. Venous ulcer ([address])         Procedure Note  Indications:  Based on my examination of this patient's wound(s) today, sharp excision is required to promote healing and evaluate the extent healing. Performed by: Festus Kocher, MD    Consent obtained? Yes    Time out taken: Yes    Pain Control: Anesthetic: 4% Lidocaine Liquid Topical     Debridement:Excisional Debridement    Using curette the wound was sharply debrided    down through and including the removal of  epidermis, dermis, and subcutaneous tissue. Devitalized Tissue Debrided:  fibrin, biofilm, and slough      Pre Debridement Measurements:  Are located in the Wound Documentation Flow Sheet   Wound #: 1, 2, and 3     Post  Debridement Measurements:  Wound 07/25/22 Leg Right; Lower;Medial #1 (Active)   Wound Image   07/24/23 0906   Wound Etiology Venous 07/25/22 1040   Wound Cleansed Cleansed with saline 07/31/23 0934   Dressing/Treatment Pharmaceutical agent (see MAR); Hydrofiber Ag 07/31/23 1025   Wound Length (cm) 4.9 cm 07/31/23 0934   Wound Width (cm) 5.5 cm 07/31/23 0934   Wound Depth (cm) 0.1 cm 07/31/23 0934   Wound Surface Area (cm^2) 26.95 cm^2 07/31/23 0934   Change in Wound Size % (l*w) -13.95 07/31/23 0934   Wound Volume (cm^3) 2.695 cm^3 07/31/23 0934   Wound Healing % -14 07/31/23 0934   Post-Procedure Length (cm) 5 cm 07/31/23 1007   Post-Procedure Width (cm) 5.6 cm 07/31/23 1007   Post-Procedure Depth (cm) 0.3 cm 07/31/23 1007   Post-Procedure Surface Area (cm^2) 28 cm^2 07/31/23 1007   Post-Procedure Volume (cm^3) 8.4 cm^3 07/31/23 1007   Distance Tunneling (cm) 0 cm 07/31/23 0934   Tunneling Position ___ O'Clock 0 12/22/22 1520   Undermining Starts ___ O'Clock 0 12/22/22 1520   Undermining Ends___ O'Clock 0 12/22/22 1520   Undermining Maxium Distance (cm) 0 07/31/23 0934   Wound Assessment Devitalized tissue;Slough 07/31/23 0949

## 2023-08-01 PROBLEM — Z45.2 PICC (PERIPHERALLY INSERTED CENTRAL CATHETER) IN PLACE: Status: ACTIVE | Noted: 2023-08-01

## 2023-08-01 RX ORDER — SODIUM CHLORIDE 0.9 % (FLUSH) 0.9 %
5-40 SYRINGE (ML) INJECTION PRN
OUTPATIENT
Start: 2023-08-01

## 2023-08-01 RX ORDER — HEPARIN 100 UNIT/ML
500 SYRINGE INTRAVENOUS PRN
OUTPATIENT
Start: 2023-08-01

## 2023-08-01 RX ORDER — SODIUM CHLORIDE 9 MG/ML
25 INJECTION, SOLUTION INTRAVENOUS PRN
OUTPATIENT
Start: 2023-08-01

## 2023-08-01 NOTE — TELEPHONE ENCOUNTER
Spoke with Sandra in [facility].     Faxed orders for weekly PICC dressing change and labs (CBC w Diff, CMP, ESR, CRP)  Results to be sent to Dr Sienna Crowe office  Days and times will be arranged tomorrow at [facility] Placement appointment

## 2023-08-02 ENCOUNTER — HOSPITAL ENCOUNTER (OUTPATIENT)
Dept: ONCOLOGY | Age: 58
Setting detail: INFUSION SERIES
Discharge: HOME | End: 2023-08-02
Payer: COMMERCIAL

## 2023-08-02 ENCOUNTER — CLINICAL DOCUMENTATION (OUTPATIENT)
Dept: INFECTIOUS DISEASES | Age: 58
End: 2023-08-02

## 2023-08-02 VITALS
HEART RATE: 80 BPM | TEMPERATURE: 98.3 F | SYSTOLIC BLOOD PRESSURE: 118 MMHG | DIASTOLIC BLOOD PRESSURE: 75 MMHG | OXYGEN SATURATION: 97 % | RESPIRATION RATE: 20 BRPM

## 2023-08-02 DIAGNOSIS — Z45.2 PICC (PERIPHERALLY INSERTED CENTRAL CATHETER) IN PLACE: ICD-10-CM

## 2023-08-02 DIAGNOSIS — L97.919 LEG ULCER, RIGHT, WITH UNSPECIFIED SEVERITY (HCC): ICD-10-CM

## 2023-08-02 DIAGNOSIS — L97.919 LEG ULCER, RIGHT, WITH UNSPECIFIED SEVERITY (HCC): Primary | ICD-10-CM

## 2023-08-02 LAB
ALBUMIN SERPL-MCNC: 3.6 G/DL (ref 3.4–5)
ALBUMIN/GLOB SERPL: 1.1 {RATIO} (ref 1.1–2.2)
ALP SERPL-CCNC: 69 U/L (ref 40–129)
ALT SERPL-CCNC: 11 U/L (ref 10–40)
ANION GAP SERPL CALCULATED.3IONS-SCNC: 11 MMOL/L (ref 3–16)
AST SERPL-CCNC: 12 U/L (ref 15–37)
BASOPHILS # BLD: 0 K/UL (ref 0–0.2)
BASOPHILS NFR BLD: 0.2 %
BILIRUB SERPL-MCNC: 0.4 MG/DL (ref 0–1)
BUN SERPL-MCNC: 19 MG/DL (ref 7–20)
CALCIUM SERPL-MCNC: 8.2 MG/DL (ref 8.3–10.6)
CHLORIDE SERPL-SCNC: 105 MMOL/L (ref 99–110)
CO2 SERPL-SCNC: 25 MMOL/L (ref 21–32)
CREAT SERPL-MCNC: 1 MG/DL (ref 0.9–1.3)
DEPRECATED RDW RBC AUTO: 15.9 % (ref 12.4–15.4)
EOSINOPHIL # BLD: 0.3 K/UL (ref 0–0.6)
EOSINOPHIL NFR BLD: 4.4 %
GFR SERPLBLD CREATININE-BSD FMLA CKD-EPI: >60 ML/MIN/{1.73_M2}
GLUCOSE SERPL-MCNC: 136 MG/DL (ref 70–99)
HCT VFR BLD AUTO: 33.3 % (ref 40.5–52.5)
HGB BLD-MCNC: 11 G/DL (ref 13.5–17.5)
INR PPP: 2.37 (ref 0.84–1.16)
LYMPHOCYTES # BLD: 1.5 K/UL (ref 1–5.1)
LYMPHOCYTES NFR BLD: 24.7 %
MCH RBC QN AUTO: 27.1 PG (ref 26–34)
MCHC RBC AUTO-ENTMCNC: 33 G/DL (ref 31–36)
MCV RBC AUTO: 82.2 FL (ref 80–100)
MONOCYTES # BLD: 0.5 K/UL (ref 0–1.3)
MONOCYTES NFR BLD: 8 %
NEUTROPHILS # BLD: 3.9 K/UL (ref 1.7–7.7)
NEUTROPHILS NFR BLD: 62.7 %
PLATELET # BLD AUTO: 299 K/UL (ref 135–450)
PMV BLD AUTO: 6.2 FL (ref 5–10.5)
POTASSIUM SERPL-SCNC: 4.5 MMOL/L (ref 3.5–5.1)
PROT SERPL-MCNC: 6.9 G/DL (ref 6.4–8.2)
PROTHROMBIN TIME: 25.7 SEC (ref 11.5–14.8)
RBC # BLD AUTO: 4.05 M/UL (ref 4.2–5.9)
SODIUM SERPL-SCNC: 141 MMOL/L (ref 136–145)
WBC # BLD AUTO: 6.1 K/UL (ref 4–11)

## 2023-08-02 PROCEDURE — 2500000003 HC RX 250 WO HCPCS: Performed by: INTERNAL MEDICINE

## 2023-08-02 PROCEDURE — 85610 PROTHROMBIN TIME: CPT

## 2023-08-02 PROCEDURE — 80053 COMPREHEN METABOLIC PANEL: CPT

## 2023-08-02 PROCEDURE — 85025 COMPLETE CBC W/AUTO DIFF WBC: CPT

## 2023-08-02 RX ORDER — SODIUM CHLORIDE 0.9 % (FLUSH) 0.9 %
5-40 SYRINGE (ML) INJECTION EVERY 12 HOURS SCHEDULED
Status: DISCONTINUED | OUTPATIENT
Start: 2023-08-02 | End: 2023-08-03 | Stop reason: HOSPADM

## 2023-08-02 RX ORDER — LIDOCAINE HYDROCHLORIDE 10 MG/ML
5 INJECTION, SOLUTION EPIDURAL; INFILTRATION; INTRACAUDAL; PERINEURAL ONCE
Status: COMPLETED | OUTPATIENT
Start: 2023-08-02 | End: 2023-08-02

## 2023-08-02 RX ORDER — SODIUM CHLORIDE 9 MG/ML
25 INJECTION, SOLUTION INTRAVENOUS PRN
Status: DISCONTINUED | OUTPATIENT
Start: 2023-08-02 | End: 2023-08-03 | Stop reason: HOSPADM

## 2023-08-02 RX ORDER — SODIUM CHLORIDE 0.9 % (FLUSH) 0.9 %
5-40 SYRINGE (ML) INJECTION PRN
Status: DISCONTINUED | OUTPATIENT
Start: 2023-08-02 | End: 2023-08-03 | Stop reason: HOSPADM

## 2023-08-02 RX ADMIN — LIDOCAINE HYDROCHLORIDE 5 ML: 10 INJECTION, SOLUTION EPIDURAL; INFILTRATION; INTRACAUDAL; PERINEURAL at 14:44

## 2023-08-02 NOTE — PROCEDURES
SINGLE PICC PROCEDURE NOTE  Chart reviewed for allergies, diagnosis, labs, known contraindications, reason for line placement and planned length of treatment. Informed consent noted to be signed and on chart. Insertion procedure discussed with patient/family member. Three patient identifiers - Patient name,   and MRN -  completed &  confirmed verbally. Time out performed Hat, mask and eye shield donned. PICC site cleaned with chlorhexidine wipes then scrubbed with Chloraprep  One-Step applicator for 30 seconds x 1. Hand Hygiene  performed with 3% Chlorhexidine surgical scrub x1 min prior to  sterile gloves, sterile gown being donned. Patient draped using maximal sterile barrier technique ( head to toe ). PICC site scrubbed a 2nd time with Chloraprep One-Step applicator x 30 sec. Modified Seldinger technique/ultrasound assisted and tip locating system utilized for insertion and 1% Lidocaine 5 ml injected intradermal pre-insertion. PICC tip location in the SVC confirmed by ECG technology. Positive brisk blood return obtained from lumen. Valve applied to lumen and flushed with 10 mls  0.9% Sterile Sodium Chloride. All lumens flush easily with no resistance. Skin prep applied to site. Catheter secured with non-sutured locking device per hospital protocol. Bio-patch/CHG impregnated sterile tegaderm dressing applied. Alcohol Swab Cap applied to valve. Sterile field maintained during procedure. PICC insertion, rhythm and positioning wire (utilized prn) accounted for post procedure and disposed of in sharps. Appearance of site is Clean dry and intact without bleeding or edema. All edges of Tegaderm occlusive. Site marked with date and initials of RN placing line. Teaching performed to pt/family and noted in education section. Bed placed in low position post-procedure. Top 2 side rails in up position call button in reach. Pt.

## 2023-08-02 NOTE — PROGRESS NOTES
OPAT Nurse Coordinator Assessment Note      Primary ID Diagnosis:  Infected R LE ulceration  Referring ID Provider: Dr Jess Abraham      IV Antimicrobial Therapy Plan: 13.5 gm Continuous infusion; PIP/TAZO through 8/30/23  IV Access: SL PICC    Family Support: Brianna Baeza - child    Contact information: [phone]    Outpatient services (including home infusion, home health, facility referral: Alberto Pedroza for weekly PICC dressing changes and lab. [facility] for home IV ATB delivery  ID follow up appointment:  August 30, 2023       Patient Assessment    I spoke with patient in [address] [place] [address] room 2, and completed an initial assessment with regards to OPAT eligibility. I explained the OPAT program and its service (IV antimicrobial management, lab monitoring, follow up appointments reminders, etc.) to the patient. Patient states he has previously had IV ATB with Dr Lakhwinder Guadarrama 3 years ago\" and is very comfortable in administering the Zosyn at home. After this discussion, the patient appeared to be a good candidate for the OPAT program.     Patient was educated about how PICC line is placed, s/s of infection at the PICC line insertion site, how to keep the PICC line dressing dry while bathing, weight lifting limitations of 10 pounds or more, avoiding intense physical work and any repetitive motion within arm the PICC line was placed and how the PICC line dressing must be changed each week, weekly lab monitoring, and OPAT availability. Patient was given information about IV antimicrobials including possible administration options (continuous , IV push, number of infusions) and estimated duration of therapy. Patient verbalized understanding and provide teachback on assessment discussion stated above. Patient agreed for the OPAT team to leave messages on their cell phone. OPAT program packet including contacts were left with patient in the event he would have questions or concerns.  The OPAT nurse

## 2023-08-02 NOTE — TELEPHONE ENCOUNTER
Verified with Garry Bradford RN in [address] [place] [address] that orders were received yesterday for weekly PICC dressing change and lab work for this patient

## 2023-08-03 ENCOUNTER — TELEPHONE (OUTPATIENT)
Dept: INFECTIOUS DISEASES | Age: 58
End: 2023-08-03

## 2023-08-03 ENCOUNTER — HOSPITAL ENCOUNTER (OUTPATIENT)
Dept: WOUND CARE | Age: 58
Discharge: HOME | End: 2023-08-03
Payer: COMMERCIAL

## 2023-08-03 VITALS
RESPIRATION RATE: 20 BRPM | TEMPERATURE: 98 F | SYSTOLIC BLOOD PRESSURE: 152 MMHG | HEART RATE: 74 BPM | DIASTOLIC BLOOD PRESSURE: 65 MMHG

## 2023-08-03 DIAGNOSIS — I89.0 CHRONIC ACQUIRED LYMPHEDEMA: ICD-10-CM

## 2023-08-03 DIAGNOSIS — Z91.199 NONCOMPLIANCE: Primary | ICD-10-CM

## 2023-08-03 DIAGNOSIS — I87.311 IDIOPATHIC CHRONIC VENOUS HYPERTENSION OF RIGHT LOWER EXTREMITY WITH ULCER (HCC): ICD-10-CM

## 2023-08-03 DIAGNOSIS — I83.009 VENOUS ULCER (HCC): ICD-10-CM

## 2023-08-03 DIAGNOSIS — I89.0 LYMPHEDEMA: ICD-10-CM

## 2023-08-03 DIAGNOSIS — L97.909 VENOUS ULCER (HCC): ICD-10-CM

## 2023-08-03 DIAGNOSIS — L97.919 IDIOPATHIC CHRONIC VENOUS HYPERTENSION OF RIGHT LOWER EXTREMITY WITH ULCER (HCC): ICD-10-CM

## 2023-08-03 PROCEDURE — 29581 APPL MULTLAYER CMPRN SYS LEG: CPT

## 2023-08-03 ASSESSMENT — PAIN DESCRIPTION - LOCATION: LOCATION: LEG

## 2023-08-03 ASSESSMENT — PAIN DESCRIPTION - DESCRIPTORS: DESCRIPTORS: BURNING

## 2023-08-03 ASSESSMENT — PAIN DESCRIPTION - PAIN TYPE: TYPE: CHRONIC PAIN;ACUTE PAIN

## 2023-08-03 ASSESSMENT — PAIN SCALES - GENERAL: PAINLEVEL_OUTOF10: 7

## 2023-08-03 ASSESSMENT — PAIN DESCRIPTION - ONSET: ONSET: ON-GOING

## 2023-08-03 ASSESSMENT — PAIN DESCRIPTION - ORIENTATION: ORIENTATION: RIGHT

## 2023-08-03 ASSESSMENT — PAIN DESCRIPTION - FREQUENCY: FREQUENCY: CONTINUOUS

## 2023-08-03 NOTE — PROGRESS NOTES
Multilayer Compression Wrap   (Not Unna) Below the Knee    NAME:  Nataliya Acosta  YOB: 1965  MEDICAL RECORD NUMBER:  [id number]  DATE:  8/3/2023       Removed old Multilayer wrap if present and washed leg with mild soap/water. Applied moisturizing agent to dry skin as needed. Applied primary and secondary dressing as ordered    Applied multilayered dressing below the knee to Right lower leg(s)  (4 Layer Compression Wrap ) . Instructed patient/caregiver not to remove dressing and to keep it clean and dry. Instructed patient/caregiver on complications to report to provider, such as pain, numbness in toes, heavy drainage, and slippage of dressing. Instructed patient on purpose of compression dressing and on activity and exercise recommendations.    Applied per   Guidelines    Electronically signed by Claudette Martins RN on 8/3/2023 at 9:10 AM

## 2023-08-03 NOTE — TELEPHONE ENCOUNTER
Contacted pt  Pt stated he was able to start IV ATB at home yesterday afternoon  Pt had not brought his extension to the infusion center for IV ATB hook up after PICC placement  [facility] [facility] does not carry extensions  Pt stated he had an extension at home    I reached out to [facility] to verify they are sending extensions with the [address] stated he will have extensions delivered to pt home either today or tomorrow morning  Pt is scheduled for Wednesday's at 1300, in [facility], for dressing changes and labs    Pt was notified to expect more extension to be delivered today or tomorrow morning and to take one with him to [facility] appt   Pt v/u    Pt insurance not accepted by any local [address]  [facility] agencies sought by [facility] during [facility]  Therefore, pt will be coming to [address] [place] [address] weekly for dressing changes and labs

## 2023-08-03 NOTE — DISCHARGE INSTRUCTIONS
[facility] Physician Orders and Discharge Instructions  The [address] [place] [address] Road [facility] [address]  Telephone: [phone]    NAME:  Javon Marti:  1965  MEDICAL RECORD NUMBER:  [id number]  DATE:  8/3/2023      Wound care:  Continue to follow the instructions and recommendations from your last doctor visit. The dressing(s) applied is the same as your last visit. Please refer to your last discharge instruction for the information on your wound care. If there were any changes made, please follow the instructions as written here:     Future Appointments     Future Appointments   Date Time Provider [address]   8/7/2023  9:15 AM [facility]   8/9/2023  1:00 PM [facility] 3 [facility] INF None   8/16/2023  1:00 PM [facility] 3 [facility] INF None   8/18/2023  1:30  [facility]   8/23/2023  1:00 PM [facility] LAB 3 [facility] INF None   8/30/2023 11:00 AM Donald Gutierrez MD [facility]   8/30/2023  1:00 PM [facility] 3 [facility] INF None   9/6/2023  1:00 PM [facility] LAB 3 [facility] INF None   9/13/2023  1:00 PM [facility] LAB 3 [facility] INF None   9/20/2023  1:00 PM [facility] [address] INF None           Your nurse  is:  Roxie Dickinson     Electronically signed by Mir Marrufo RN on 8/3/2023 at 9:04 [facility] Information: Should you experience any significant changes in your wound(s) or have questions about your wound care, please contact the [facility] at [phone]. Our hours vary so please leave a message. Please give us 24-48 hours to return your call. If you need help with your wounds and cannot wait until we are available, contact your PCP or go to the hospital emergency room.        Physician orders by:  Dr. Knott Hence        The information contained in the After Visit Summary has been

## 2023-08-07 ENCOUNTER — HOSPITAL ENCOUNTER (OUTPATIENT)
Dept: WOUND CARE | Age: 58
Discharge: HOME | End: 2023-08-07
Payer: COMMERCIAL

## 2023-08-07 VITALS
DIASTOLIC BLOOD PRESSURE: 75 MMHG | TEMPERATURE: 97 F | RESPIRATION RATE: 16 BRPM | HEART RATE: 87 BPM | SYSTOLIC BLOOD PRESSURE: 123 MMHG

## 2023-08-07 DIAGNOSIS — I87.311 IDIOPATHIC CHRONIC VENOUS HYPERTENSION OF RIGHT LOWER EXTREMITY WITH ULCER (HCC): ICD-10-CM

## 2023-08-07 DIAGNOSIS — L97.909 VENOUS ULCER (HCC): ICD-10-CM

## 2023-08-07 DIAGNOSIS — I89.0 LYMPHEDEMA: ICD-10-CM

## 2023-08-07 DIAGNOSIS — Z91.199 NONCOMPLIANCE: Primary | ICD-10-CM

## 2023-08-07 DIAGNOSIS — I89.0 CHRONIC ACQUIRED LYMPHEDEMA: ICD-10-CM

## 2023-08-07 DIAGNOSIS — L97.919 IDIOPATHIC CHRONIC VENOUS HYPERTENSION OF RIGHT LOWER EXTREMITY WITH ULCER (HCC): ICD-10-CM

## 2023-08-07 DIAGNOSIS — I83.009 VENOUS ULCER (HCC): ICD-10-CM

## 2023-08-07 PROCEDURE — 11045 DBRDMT SUBQ TISS EACH ADDL: CPT

## 2023-08-07 PROCEDURE — 11042 DBRDMT SUBQ TIS 1ST 20SQCM/<: CPT

## 2023-08-07 PROCEDURE — 29581 APPL MULTLAYER CMPRN SYS LEG: CPT

## 2023-08-07 PROCEDURE — 6370000000 HC RX 637 (ALT 250 FOR IP): Performed by: SPECIALIST

## 2023-08-07 RX ORDER — LIDOCAINE 40 MG/G
CREAM TOPICAL ONCE
OUTPATIENT
Start: 2023-08-07 | End: 2023-08-07

## 2023-08-07 RX ORDER — LIDOCAINE HYDROCHLORIDE 20 MG/ML
JELLY TOPICAL ONCE
OUTPATIENT
Start: 2023-08-07 | End: 2023-08-07

## 2023-08-07 RX ORDER — GENTAMICIN SULFATE 1 MG/G
OINTMENT TOPICAL ONCE
OUTPATIENT
Start: 2023-08-07 | End: 2023-08-07

## 2023-08-07 RX ORDER — IBUPROFEN 200 MG
TABLET ORAL ONCE
OUTPATIENT
Start: 2023-08-07 | End: 2023-08-07

## 2023-08-07 RX ORDER — GINSENG 100 MG
CAPSULE ORAL ONCE
OUTPATIENT
Start: 2023-08-07 | End: 2023-08-07

## 2023-08-07 RX ORDER — BACITRACIN ZINC AND POLYMYXIN B SULFATE 500; 1000 [USP'U]/G; [USP'U]/G
OINTMENT TOPICAL ONCE
OUTPATIENT
Start: 2023-08-07 | End: 2023-08-07

## 2023-08-07 RX ORDER — LIDOCAINE 50 MG/G
OINTMENT TOPICAL ONCE
OUTPATIENT
Start: 2023-08-07 | End: 2023-08-07

## 2023-08-07 RX ORDER — CLOBETASOL PROPIONATE 0.5 MG/G
OINTMENT TOPICAL ONCE
OUTPATIENT
Start: 2023-08-07 | End: 2023-08-07

## 2023-08-07 RX ORDER — LIDOCAINE HYDROCHLORIDE 40 MG/ML
SOLUTION TOPICAL ONCE
Status: COMPLETED | OUTPATIENT
Start: 2023-08-07 | End: 2023-08-07

## 2023-08-07 RX ORDER — LIDOCAINE HYDROCHLORIDE 40 MG/ML
SOLUTION TOPICAL ONCE
OUTPATIENT
Start: 2023-08-07 | End: 2023-08-07

## 2023-08-07 RX ORDER — SODIUM CHLOR/HYPOCHLOROUS ACID 0.033 %
SOLUTION, IRRIGATION IRRIGATION ONCE
OUTPATIENT
Start: 2023-08-07 | End: 2023-08-07

## 2023-08-07 RX ORDER — BETAMETHASONE DIPROPIONATE 0.05 %
OINTMENT (GRAM) TOPICAL ONCE
OUTPATIENT
Start: 2023-08-07 | End: 2023-08-07

## 2023-08-07 RX ADMIN — LIDOCAINE HYDROCHLORIDE: 40 SOLUTION TOPICAL at 09:37

## 2023-08-07 ASSESSMENT — PAIN DESCRIPTION - DESCRIPTORS: DESCRIPTORS: ACHING

## 2023-08-07 ASSESSMENT — PAIN SCALES - GENERAL: PAINLEVEL_OUTOF10: 2

## 2023-08-07 ASSESSMENT — PAIN DESCRIPTION - PAIN TYPE: TYPE: CHRONIC PAIN

## 2023-08-07 ASSESSMENT — PAIN DESCRIPTION - ORIENTATION: ORIENTATION: RIGHT

## 2023-08-07 ASSESSMENT — PAIN DESCRIPTION - LOCATION: LOCATION: LEG

## 2023-08-07 NOTE — PROGRESS NOTES
Multilayer Compression Wrap   (Not Unna) Below the Knee    NAME:  Roberth Leslie  YOB: 1965  MEDICAL RECORD NUMBER:  [id number]  DATE:  8/7/2023       Removed old Multilayer wrap if present and washed leg with mild soap/water. Applied moisturizing agent to dry skin as needed. Applied primary and secondary dressing as ordered    Applied multilayered dressing below the knee to Right lower leg(s)  (4 Layer Compression Wrap ) . Instructed patient/caregiver not to remove dressing and to keep it clean and dry. Instructed patient/caregiver on complications to report to provider, such as pain, numbness in toes, heavy drainage, and slippage of dressing. Instructed patient on purpose of compression dressing and on activity and exercise recommendations.    Applied per   Guidelines    Electronically signed by Albert Mike RN on 8/7/2023 at 10:21 AM
Orders Placed This Encounter   Procedures    Initiate Outpatient Wound Care Protocol       Discharge Instructions          [address] [place] [address] Physician Orders and Discharge Instructions  [address] [address] [address] [place] [address]. [place] [address]  Telephone: [phone]  NAME:  Daphne Amaral  YOB: 1965  MEDICAL RECORD NUMBER:  [id number]  DATE: 8/7/23     Return Appointment:  Return Appointment: With Elliot Guzman MD  in  [facility])  [x] Return Appointment for a Wound Assessment with the nurse on: 08/10/23    Future Appointments   Date Time Provider [address]   8/9/2023  1:00 [address] LAB 3 [facility] INF None   8/16/2023  1:00 [address] LAB 3 [facility] INF None   8/18/2023  1:30  [facility]   8/23/2023  1:00 PM [facility] LAB 3 [facility] INF None   8/30/2023 11:00 AM Mitchell Lundberg MD [facility]   8/30/2023  1:00 PM [facility] LAB 3 [facility] INF None   9/6/2023  1:00 PM [facility] LAB 3 [facility] INF None   9/13/2023  1:00 PM [facility] LAB 3 [facility] INF None   9/20/2023  1:00  [facility]    Medically necessary services for evaluation and treatment: []Skilled Nursing (using clean technique) []PT (Eval & Treat) []OT (Eval & Treat) []Social Work []Dietician []Other:      Wound care instructions: If you smoke we ask that you refrain from smoking. Smoking inhibits wounds from healing. When taking antibiotics take the entire prescription as ordered. Do not stop taking until medication is all gone unless otherwise instructed. Exercise as tolerated. Keep weight off wounds and reposition every 2 hours if applicable. Do not get wounds wet in the bath or shower unless otherwise instructed by your physician. If your wound is on your foot or leg, you may purchase a cast bag. Please ask at the pharmacy.   Wash hands with soap and water prior to and after every

## 2023-08-07 NOTE — DISCHARGE INSTRUCTIONS
[facility] Physician Orders and Discharge Instructions  [address] [place] [address]. [facility] 103  Telephone: [phone]  NAME:  Jerad Muñoz  YOB: 1965  MEDICAL RECORD NUMBER:  [id number]  DATE: 8/7/23     Return Appointment:  Return Appointment: With Lelia Espinoza MD  in  1 [facility])  [x] Return Appointment for a Wound Assessment with the nurse on: 08/10/23    Future Appointments   Date Time Provider [address]   8/9/2023  1:00 [address] LAB 3 [facility] INF None   8/16/2023  1:00 [address] LAB 3 [facility] INF None   8/18/2023  1:30  [facility]   8/23/2023  1:00 PM [facility] LAB 3 [facility] INF None   8/30/2023 11:00 AM Betty Mora MD [facility]   8/30/2023  1:00 PM [facility] LAB 3 [facility] INF None   9/6/2023  1:00 PM [facility] LAB 3 [facility] INF None   9/13/2023  1:00 PM [facility] LAB 3 [facility] INF None   9/20/2023  1:00  [facility]: none    Medically necessary services for evaluation and treatment: []Skilled Nursing (using clean technique) []PT (Eval & Treat) []OT (Eval & Treat) []Social Work []Dietician []Other:      Wound care instructions: If you smoke we ask that you refrain from smoking. Smoking inhibits wounds from healing. When taking antibiotics take the entire prescription as ordered. Do not stop taking until medication is all gone unless otherwise instructed. Exercise as tolerated. Keep weight off wounds and reposition every 2 hours if applicable. Do not get wounds wet in the bath or shower unless otherwise instructed by your physician. If your wound is on your foot or leg, you may purchase a cast bag. Please ask at the pharmacy. Wash hands with soap and water prior to and after every dressing change.     [x]Wash wounds with: Vashe wash - Apply enough Vashe to soak a piece of gauze and place on wound bed for

## 2023-08-08 ENCOUNTER — OFFICE VISIT (OUTPATIENT)
Dept: INFECTIOUS DISEASES | Age: 58
End: 2023-08-08
Payer: COMMERCIAL

## 2023-08-08 VITALS
BODY MASS INDEX: 41.17 KG/M2 | SYSTOLIC BLOOD PRESSURE: 130 MMHG | TEMPERATURE: 97.7 F | WEIGHT: 256.2 LBS | HEIGHT: 66 IN | OXYGEN SATURATION: 95 % | HEART RATE: 78 BPM | DIASTOLIC BLOOD PRESSURE: 80 MMHG

## 2023-08-08 DIAGNOSIS — I87.311 IDIOPATHIC CHRONIC VENOUS HYPERTENSION OF RIGHT LOWER EXTREMITY WITH ULCER (HCC): ICD-10-CM

## 2023-08-08 DIAGNOSIS — L97.919 IDIOPATHIC CHRONIC VENOUS HYPERTENSION OF RIGHT LOWER EXTREMITY WITH ULCER (HCC): ICD-10-CM

## 2023-08-08 DIAGNOSIS — L97.919 LEG ULCER, RIGHT, WITH UNSPECIFIED SEVERITY (HCC): Primary | ICD-10-CM

## 2023-08-08 DIAGNOSIS — A49.8 PSEUDOMONAS INFECTION: ICD-10-CM

## 2023-08-08 PROCEDURE — 99214 OFFICE O/P EST MOD 30 MIN: CPT | Performed by: INTERNAL MEDICINE

## 2023-08-08 NOTE — PROGRESS NOTES
Allergies:  Patient has no known allergies. Social History:    TOBACCO:                None      ETOH:                        None      DRUGS:                     None      MARITAL STATUS:                 OCCUPATION:          [employer] and     Family History:   No immunodeficiency     REVIEW OF SYSTEMS:    No fever / chills / sweats. No weight loss. No visual change, eye pain, eye discharge. No oral lesion, sore throat, dysphagia. Denies cough / sputum. Denies chest pain, palpitations. Denies n / v / abd pain. No diarrhea. Denies dysuria or change in urinary function. Denies joint swelling or pain. No myalgia, arthralgia. Denies skin changes, itching  Denies new / worse depression, psychiatric symptoms  Denies focal weakness, sensory change or other neurologic symptom  No symptoms endocrinopathy. No symptoms hematologic disease. PHYSICAL EXAM:    Vitals:  See intake vitals including weight    GENERAL: No apparent distress.     HEENT: Membranes moist, no oral lesion  NECK:  Supple  LYMPH: No adenopathy   LUNGS: Clear b/l, no rales, no dullness  CARDIAC: RRR, no murmur appreciated  ABD:  + BS, soft / NT  EXT:  R LE with dressing / compression    L leg with compression stocking  NEURO: No focal neurologic findings  PSYCH: Orientation, sensorium, mood normal      Previous cultures:  2/19/18 Wound: light Ps aeruginosa  Antibiotic Interpretation JIGNESH Unit   cefepime Intermediate 16 mcg/mL   ciprofloxacin Resistant >2 mcg/mL   gentamicin Sensitive <=4 mcg/mL   meropenem Resistant >8 mcg/mL   piperacillin-tazobactam Intermediate 32 mcg/mL   tobramycin Sensitive <=4 mcg/mL      1/5/18 Wound: light Ps fluorescens/putida, Ps aeruginosa  PSEUDOMONAS FLUORESCENS PUTIDA GROUP   Antibiotic Interpretation JIGNESH Unit   cefepime Intermediate 16 mcg/mL   ciprofloxacin Resistant >2 mcg/mL   gentamicin Sensitive <=4 mcg/mL   meropenem Resistant >8 mcg/mL   piperacillin-tazobactam Sensitive <=16 mcg/mL

## 2023-08-09 ENCOUNTER — HOSPITAL ENCOUNTER (OUTPATIENT)
Dept: ONCOLOGY | Age: 58
Setting detail: INFUSION SERIES
Discharge: HOME | End: 2023-08-09
Payer: COMMERCIAL

## 2023-08-09 VITALS
RESPIRATION RATE: 20 BRPM | SYSTOLIC BLOOD PRESSURE: 124 MMHG | HEART RATE: 81 BPM | OXYGEN SATURATION: 96 % | TEMPERATURE: 98.4 F | DIASTOLIC BLOOD PRESSURE: 78 MMHG

## 2023-08-09 PROCEDURE — 99211 OFF/OP EST MAY X REQ PHY/QHP: CPT

## 2023-08-09 NOTE — PROGRESS NOTES
Patient seen and assessed for weekly line care needs. CVC site remains free of visible signs and symptoms of infection. No drainage, edema, erythema, pain, itching or warmth noted at and around the insertion site. Line care performed by [facility] RN. The need for continued use of the CVC is due to ongoing therapy. Patient verbalizes understanding of line care education provided by line care RN. Staff RNs will continue to monitor and assess the CVC site throughout the patient's appointments. Sterile dressing changes will continue to be changed per policy.     Signed by Lazarus Albarran RN on 8/9/2023 at 2:06 PM

## 2023-08-10 ENCOUNTER — TELEPHONE (OUTPATIENT)
Dept: INFECTIOUS DISEASES | Age: 58
End: 2023-08-10

## 2023-08-10 ENCOUNTER — HOSPITAL ENCOUNTER (OUTPATIENT)
Dept: WOUND CARE | Age: 58
Discharge: HOME | End: 2023-08-10

## 2023-08-10 VITALS
DIASTOLIC BLOOD PRESSURE: 78 MMHG | RESPIRATION RATE: 18 BRPM | TEMPERATURE: 97.7 F | HEART RATE: 76 BPM | SYSTOLIC BLOOD PRESSURE: 150 MMHG

## 2023-08-10 DIAGNOSIS — I89.0 LYMPHEDEMA: ICD-10-CM

## 2023-08-10 DIAGNOSIS — I89.0 CHRONIC ACQUIRED LYMPHEDEMA: ICD-10-CM

## 2023-08-10 DIAGNOSIS — L97.909 VENOUS ULCER (HCC): ICD-10-CM

## 2023-08-10 DIAGNOSIS — I83.009 VENOUS ULCER (HCC): ICD-10-CM

## 2023-08-10 DIAGNOSIS — L97.919 IDIOPATHIC CHRONIC VENOUS HYPERTENSION OF RIGHT LOWER EXTREMITY WITH ULCER (HCC): ICD-10-CM

## 2023-08-10 DIAGNOSIS — I87.311 IDIOPATHIC CHRONIC VENOUS HYPERTENSION OF RIGHT LOWER EXTREMITY WITH ULCER (HCC): ICD-10-CM

## 2023-08-10 DIAGNOSIS — Z91.199 NONCOMPLIANCE: Primary | ICD-10-CM

## 2023-08-10 ASSESSMENT — PAIN DESCRIPTION - LOCATION: LOCATION: LEG

## 2023-08-10 ASSESSMENT — PAIN DESCRIPTION - ORIENTATION: ORIENTATION: RIGHT

## 2023-08-10 ASSESSMENT — PAIN DESCRIPTION - DESCRIPTORS: DESCRIPTORS: ACHING

## 2023-08-10 ASSESSMENT — PAIN SCALES - GENERAL: PAINLEVEL_OUTOF10: 7

## 2023-08-10 NOTE — PROGRESS NOTES
Multilayer Compression Wrap   (Not Unna) Below the Knee    NAME:  Everett Ortega  YOB: 1965  MEDICAL RECORD NUMBER:  [id number]  DATE:  8/10/2023       Removed old Multilayer wrap if present and washed leg with mild soap/water. Applied moisturizing agent to dry skin as needed. Applied primary and secondary dressing as ordered    Applied multilayered dressing below the knee to Right lower leg(s)  (4 Layer Compression Wrap ) . Instructed patient/caregiver not to remove dressing and to keep it clean and dry. Instructed patient/caregiver on complications to report to provider, such as pain, numbness in toes, heavy drainage, and slippage of dressing. Instructed patient on purpose of compression dressing and on activity and exercise recommendations.    Applied per   Guidelines    Electronically signed by Bobby Hunter RN on 8/10/2023 at 9:39 AM

## 2023-08-10 NOTE — TELEPHONE ENCOUNTER
Spoke with Juany Hicks at [facility] and states that lab orders were entered under OP orders. She states she will add weekly labs to therapy plan.

## 2023-08-10 NOTE — DISCHARGE INSTRUCTIONS
adult, by my health care provider(s). I had the opportunity to ask questions regarding this information. I have elected to receive;      [] Patient unable to sign Discharge Instructions.  Given to ECF/Transportation/POA

## 2023-08-14 ENCOUNTER — HOSPITAL ENCOUNTER (OUTPATIENT)
Dept: WOUND CARE | Age: 58
Discharge: HOME | End: 2023-08-14
Payer: COMMERCIAL

## 2023-08-14 VITALS
HEART RATE: 82 BPM | RESPIRATION RATE: 16 BRPM | TEMPERATURE: 97 F | SYSTOLIC BLOOD PRESSURE: 147 MMHG | DIASTOLIC BLOOD PRESSURE: 73 MMHG

## 2023-08-14 DIAGNOSIS — I83.009 VENOUS ULCER (HCC): ICD-10-CM

## 2023-08-14 DIAGNOSIS — I89.0 CHRONIC ACQUIRED LYMPHEDEMA: ICD-10-CM

## 2023-08-14 DIAGNOSIS — Z91.199 NONCOMPLIANCE: Primary | ICD-10-CM

## 2023-08-14 DIAGNOSIS — I87.311 IDIOPATHIC CHRONIC VENOUS HYPERTENSION OF RIGHT LOWER EXTREMITY WITH ULCER (HCC): ICD-10-CM

## 2023-08-14 DIAGNOSIS — I89.0 LYMPHEDEMA: ICD-10-CM

## 2023-08-14 DIAGNOSIS — L97.919 IDIOPATHIC CHRONIC VENOUS HYPERTENSION OF RIGHT LOWER EXTREMITY WITH ULCER (HCC): ICD-10-CM

## 2023-08-14 DIAGNOSIS — L97.909 VENOUS ULCER (HCC): ICD-10-CM

## 2023-08-14 PROCEDURE — 11042 DBRDMT SUBQ TIS 1ST 20SQCM/<: CPT

## 2023-08-14 PROCEDURE — 29581 APPL MULTLAYER CMPRN SYS LEG: CPT

## 2023-08-14 PROCEDURE — 11042 DBRDMT SUBQ TIS 1ST 20SQCM/<: CPT | Performed by: SPECIALIST

## 2023-08-14 PROCEDURE — 11045 DBRDMT SUBQ TISS EACH ADDL: CPT

## 2023-08-14 PROCEDURE — 11045 DBRDMT SUBQ TISS EACH ADDL: CPT | Performed by: SPECIALIST

## 2023-08-14 PROCEDURE — 6370000000 HC RX 637 (ALT 250 FOR IP): Performed by: SPECIALIST

## 2023-08-14 RX ORDER — SODIUM CHLOR/HYPOCHLOROUS ACID 0.033 %
SOLUTION, IRRIGATION IRRIGATION ONCE
OUTPATIENT
Start: 2023-08-14 | End: 2023-08-14

## 2023-08-14 RX ORDER — LIDOCAINE HYDROCHLORIDE 20 MG/ML
JELLY TOPICAL ONCE
OUTPATIENT
Start: 2023-08-14 | End: 2023-08-14

## 2023-08-14 RX ORDER — LIDOCAINE 40 MG/G
CREAM TOPICAL ONCE
OUTPATIENT
Start: 2023-08-14 | End: 2023-08-14

## 2023-08-14 RX ORDER — LIDOCAINE 50 MG/G
OINTMENT TOPICAL ONCE
OUTPATIENT
Start: 2023-08-14 | End: 2023-08-14

## 2023-08-14 RX ORDER — IBUPROFEN 200 MG
TABLET ORAL ONCE
OUTPATIENT
Start: 2023-08-14 | End: 2023-08-14

## 2023-08-14 RX ORDER — BETAMETHASONE DIPROPIONATE 0.05 %
OINTMENT (GRAM) TOPICAL ONCE
OUTPATIENT
Start: 2023-08-14 | End: 2023-08-14

## 2023-08-14 RX ORDER — GENTAMICIN SULFATE 1 MG/G
OINTMENT TOPICAL ONCE
OUTPATIENT
Start: 2023-08-14 | End: 2023-08-14

## 2023-08-14 RX ORDER — GINSENG 100 MG
CAPSULE ORAL ONCE
OUTPATIENT
Start: 2023-08-14 | End: 2023-08-14

## 2023-08-14 RX ORDER — BACITRACIN ZINC AND POLYMYXIN B SULFATE 500; 1000 [USP'U]/G; [USP'U]/G
OINTMENT TOPICAL ONCE
OUTPATIENT
Start: 2023-08-14 | End: 2023-08-14

## 2023-08-14 RX ORDER — LIDOCAINE HYDROCHLORIDE 40 MG/ML
SOLUTION TOPICAL ONCE
Status: COMPLETED | OUTPATIENT
Start: 2023-08-14 | End: 2023-08-14

## 2023-08-14 RX ORDER — CLOBETASOL PROPIONATE 0.5 MG/G
OINTMENT TOPICAL ONCE
OUTPATIENT
Start: 2023-08-14 | End: 2023-08-14

## 2023-08-14 RX ORDER — LIDOCAINE HYDROCHLORIDE 40 MG/ML
SOLUTION TOPICAL ONCE
OUTPATIENT
Start: 2023-08-14 | End: 2023-08-14

## 2023-08-14 RX ADMIN — LIDOCAINE HYDROCHLORIDE: 40 SOLUTION TOPICAL at 10:14

## 2023-08-14 ASSESSMENT — PAIN DESCRIPTION - ORIENTATION: ORIENTATION: RIGHT

## 2023-08-14 ASSESSMENT — PAIN DESCRIPTION - PAIN TYPE: TYPE: CHRONIC PAIN

## 2023-08-14 ASSESSMENT — PAIN SCALES - GENERAL: PAINLEVEL_OUTOF10: 8

## 2023-08-14 ASSESSMENT — PAIN DESCRIPTION - DESCRIPTORS: DESCRIPTORS: ACHING;BURNING

## 2023-08-14 NOTE — PROGRESS NOTES
[facility]  Progress Note and Procedure Note      Scott Ross  MEDICAL RECORD NUMBER:  [id number]  AGE: 62 y.o. GENDER: male  : 1965  EPISODE DATE:  2023    Subjective:     Chief Complaint   Patient presents with    Wound Check     Right lower leg         HISTORY of PRESENT ILLNESS HPI     Scott Ross is a 62 y.o. male who presents today for wound/ulcer evaluation. History of Wound Context: Continues follow-up for venous insufficiency with ulceration and massive edema and lymphedema. Remains on IV Zosyn per Dr. Ivonne Tay from infectious disease. Patient states there has been increasing pain this past week. Remains in 4 layer Coban compression.   He is occasionally using his lymphedema pumps  Wound/Ulcer Pain Timing/Severity: constant  Quality of pain: burning, throbbing  Severity:  3 / 10   Modifying Factors: None  Associated Signs/Symptoms: edema, erythema, and drainage    Ulcer Identification:  Ulcer Type: venous    Contributing Factors: edema, venous stasis, lymphedema, and obesity, anticoagulation    Acute Wound: N/A not an acute wound    PAST MEDICAL HISTORY        Diagnosis Date    DVT (deep venous thrombosis) (HCC)     Hx of blood clots     Pain and swelling of right lower leg        PAST SURGICAL HISTORY    Past Surgical History:   Procedure Laterality Date    BALLOON ANGIOPLASTY, ARTERY Right 2017    at [address], ESOPHAGUS      SKIN SPLIT GRAFT Right        FAMILY HISTORY    Family History   Problem Relation Age of Onset    Diabetes Mother     Heart Attack Father        SOCIAL HISTORY    Social History     Tobacco Use    Smoking status: Never    Smokeless tobacco: Never   Vaping Use    Vaping Use: Never used   Substance Use Topics    Alcohol use: No    Drug use: No       ALLERGIES    No Known Allergies    MEDICATIONS    Current Outpatient Medications on File Prior to Encounter   Medication Sig Dispense Refill    piperacillin-tazobactam

## 2023-08-14 NOTE — PROGRESS NOTES
Multilayer Compression Wrap   (Not Unna) Below the Knee    NAME:  Dania Forrester  YOB: 1965  MEDICAL RECORD NUMBER:  [id number]  DATE:  8/14/2023       Removed old Multilayer wrap if present and washed leg with mild soap/water. Applied moisturizing agent to dry skin as needed. Applied primary and secondary dressing as ordered    Applied multilayered dressing below the knee to Right lower leg(s)  (4 Layer Compression Wrap ) . Instructed patient/caregiver not to remove dressing and to keep it clean and dry. Instructed patient/caregiver on complications to report to provider, such as pain, numbness in toes, heavy drainage, and slippage of dressing. Instructed patient on purpose of compression dressing and on activity and exercise recommendations.    Applied per   Guidelines    Electronically signed by Any Panda RN on 8/14/2023 at 10:40 AM

## 2023-08-14 NOTE — DISCHARGE INSTRUCTIONS
soaking. [x]Kiah wound Topical Treatments: Do not apply lotions, creams, or ointments to the skin around the wound bed unless directed as followed:   Apply around the wound: Nothing         [x]Wound Location: right lower leg wounds   Apply Primary Dressing to wound: Foam with silver (I.e. Polymem Ag)  Secondary Dressing: Extra absorbant pad   Avoid contact of tape with skin if possible. When to change Dressing: DO NOT CHANGE        [x] Multilayer Compression Wrap:  Type: Applied on Right lower leg(s)  4 Layer Compression Wrap    Do not get leg(s) with wrap wet. If wraps become too tight call the center or completely remove the wrap. Elevate leg(s) above the level of the heart when sitting. Avoid prolonged standing in one place. Applied in Clinic on 8/14/2023  The Goal of this therapy is to reduce edema and get into long term compression garments to control venous insufficiency, lymphedema and reduce occurrence of venous ulcers    [x] Edema Control:  Apply: Compression Stocking on the Left leg  Apply every morning immediately when getting up. Remove every night before going to bed unless instructed otherwise     Elevate leg(s) above the level of the heart for 30 minutes 4-5 times a day and/or when sitting. Avoid prolonged standing in one place. [x] Lymphedema Therapy:  Wear Lymphedema pumps twice a day at settings prescribed by your physician. Avoid prolonged standing in one place. Dietary:  Important dietary reminders:  1. Increase Protein intake (i.e. Lean meats, fish, eggs, legumes, and yogurt)  2. No added salt  3. If diabetic, follow a diabetic diet and check glucose prior to meals or as instructed by your physician.     Dietary Supplements(Take twice a day unless instructed otherwise):  [] Samaniego Bal  [] 30ml ProStat [] Ensure Complete [] Ensure Max/Premier [] Expedite [] Other:    Your nurse  is:  [address] [facility]     Electronically signed by Yasemin Garrett RN on 8/14/2023 at 10:30 AM     Wound

## 2023-08-16 ENCOUNTER — HOSPITAL ENCOUNTER (OUTPATIENT)
Dept: ONCOLOGY | Age: 58
Setting detail: INFUSION SERIES
Discharge: HOME | End: 2023-08-16
Payer: COMMERCIAL

## 2023-08-16 ENCOUNTER — TELEPHONE (OUTPATIENT)
Dept: INFECTIOUS DISEASES | Age: 58
End: 2023-08-16

## 2023-08-16 VITALS
SYSTOLIC BLOOD PRESSURE: 136 MMHG | HEART RATE: 68 BPM | OXYGEN SATURATION: 96 % | TEMPERATURE: 98.6 F | DIASTOLIC BLOOD PRESSURE: 79 MMHG | RESPIRATION RATE: 16 BRPM

## 2023-08-16 DIAGNOSIS — Z45.2 PICC (PERIPHERALLY INSERTED CENTRAL CATHETER) IN PLACE: Primary | ICD-10-CM

## 2023-08-16 LAB
ALBUMIN SERPL-MCNC: 3.6 G/DL (ref 3.4–5)
ALBUMIN/GLOB SERPL: 1.1 {RATIO} (ref 1.1–2.2)
ALP SERPL-CCNC: 70 U/L (ref 40–129)
ALT SERPL-CCNC: 16 U/L (ref 10–40)
ANION GAP SERPL CALCULATED.3IONS-SCNC: 10 MMOL/L (ref 3–16)
AST SERPL-CCNC: 16 U/L (ref 15–37)
BASOPHILS # BLD: 0 K/UL (ref 0–0.2)
BASOPHILS NFR BLD: 0.4 %
BILIRUB SERPL-MCNC: 0.4 MG/DL (ref 0–1)
BUN SERPL-MCNC: 15 MG/DL (ref 7–20)
CALCIUM SERPL-MCNC: 8.4 MG/DL (ref 8.3–10.6)
CHLORIDE SERPL-SCNC: 107 MMOL/L (ref 99–110)
CO2 SERPL-SCNC: 22 MMOL/L (ref 21–32)
CREAT SERPL-MCNC: 1.1 MG/DL (ref 0.9–1.3)
CRP SERPL-MCNC: 32.9 MG/L (ref 0–5.1)
DEPRECATED RDW RBC AUTO: 16.2 % (ref 12.4–15.4)
EOSINOPHIL # BLD: 0.3 K/UL (ref 0–0.6)
EOSINOPHIL NFR BLD: 7 %
ERYTHROCYTE [SEDIMENTATION RATE] IN BLOOD BY WESTERGREN METHOD: 63 MM/HR (ref 0–20)
GFR SERPLBLD CREATININE-BSD FMLA CKD-EPI: >60 ML/MIN/{1.73_M2}
GLUCOSE SERPL-MCNC: 137 MG/DL (ref 70–99)
HCT VFR BLD AUTO: 32 % (ref 40.5–52.5)
HGB BLD-MCNC: 10.7 G/DL (ref 13.5–17.5)
LYMPHOCYTES # BLD: 1.1 K/UL (ref 1–5.1)
LYMPHOCYTES NFR BLD: 26.9 %
MCH RBC QN AUTO: 27.4 PG (ref 26–34)
MCHC RBC AUTO-ENTMCNC: 33.3 G/DL (ref 31–36)
MCV RBC AUTO: 82.3 FL (ref 80–100)
MONOCYTES # BLD: 0.4 K/UL (ref 0–1.3)
MONOCYTES NFR BLD: 10.3 %
NEUTROPHILS # BLD: 2.3 K/UL (ref 1.7–7.7)
NEUTROPHILS NFR BLD: 55.4 %
PLATELET # BLD AUTO: 169 K/UL (ref 135–450)
PMV BLD AUTO: 6.9 FL (ref 5–10.5)
POTASSIUM SERPL-SCNC: 4.3 MMOL/L (ref 3.5–5.1)
PROT SERPL-MCNC: 7 G/DL (ref 6.4–8.2)
RBC # BLD AUTO: 3.89 M/UL (ref 4.2–5.9)
SODIUM SERPL-SCNC: 139 MMOL/L (ref 136–145)
WBC # BLD AUTO: 4.1 K/UL (ref 4–11)

## 2023-08-16 PROCEDURE — 80053 COMPREHEN METABOLIC PANEL: CPT

## 2023-08-16 PROCEDURE — 85652 RBC SED RATE AUTOMATED: CPT

## 2023-08-16 PROCEDURE — 36592 COLLECT BLOOD FROM PICC: CPT

## 2023-08-16 PROCEDURE — 85025 COMPLETE CBC W/AUTO DIFF WBC: CPT

## 2023-08-16 PROCEDURE — 86140 C-REACTIVE PROTEIN: CPT

## 2023-08-16 PROCEDURE — 99211 OFF/OP EST MAY X REQ PHY/QHP: CPT

## 2023-08-16 RX ORDER — SODIUM CHLORIDE 0.9 % (FLUSH) 0.9 %
5-40 SYRINGE (ML) INJECTION PRN
OUTPATIENT
Start: 2023-08-23

## 2023-08-16 RX ORDER — SODIUM CHLORIDE 9 MG/ML
25 INJECTION, SOLUTION INTRAVENOUS PRN
OUTPATIENT
Start: 2023-08-23

## 2023-08-16 RX ORDER — HEPARIN 100 UNIT/ML
500 SYRINGE INTRAVENOUS PRN
Status: DISCONTINUED | OUTPATIENT
Start: 2023-08-16 | End: 2023-08-17 | Stop reason: HOSPADM

## 2023-08-16 RX ORDER — HEPARIN 100 UNIT/ML
500 SYRINGE INTRAVENOUS PRN
OUTPATIENT
Start: 2023-08-23

## 2023-08-16 NOTE — TELEPHONE ENCOUNTER
Spoke with pt, no c/o. Pt states wound care center states wound looks better. Still with some pain after seeing wound care. \"Doing fine with IV abx. \".

## 2023-08-16 NOTE — PROGRESS NOTES
Patient seen in OPO today for dressing/ cap change and labs. CVC site showed no signs or symptoms of infection. Patient verbalized understanding of line care. Patient discharged home with self.     .me

## 2023-08-17 ENCOUNTER — HOSPITAL ENCOUNTER (OUTPATIENT)
Dept: WOUND CARE | Age: 58
Discharge: HOME | End: 2023-08-17

## 2023-08-17 VITALS
DIASTOLIC BLOOD PRESSURE: 83 MMHG | HEART RATE: 73 BPM | RESPIRATION RATE: 16 BRPM | SYSTOLIC BLOOD PRESSURE: 152 MMHG | TEMPERATURE: 97 F

## 2023-08-17 DIAGNOSIS — I89.0 LYMPHEDEMA: ICD-10-CM

## 2023-08-17 DIAGNOSIS — I83.009 VENOUS ULCER (HCC): ICD-10-CM

## 2023-08-17 DIAGNOSIS — I89.0 CHRONIC ACQUIRED LYMPHEDEMA: ICD-10-CM

## 2023-08-17 DIAGNOSIS — L97.919 IDIOPATHIC CHRONIC VENOUS HYPERTENSION OF RIGHT LOWER EXTREMITY WITH ULCER (HCC): ICD-10-CM

## 2023-08-17 DIAGNOSIS — I87.311 IDIOPATHIC CHRONIC VENOUS HYPERTENSION OF RIGHT LOWER EXTREMITY WITH ULCER (HCC): ICD-10-CM

## 2023-08-17 DIAGNOSIS — L97.909 VENOUS ULCER (HCC): ICD-10-CM

## 2023-08-17 DIAGNOSIS — Z91.199 NONCOMPLIANCE: Primary | ICD-10-CM

## 2023-08-17 ASSESSMENT — PAIN DESCRIPTION - ORIENTATION: ORIENTATION: RIGHT

## 2023-08-17 ASSESSMENT — PAIN DESCRIPTION - LOCATION: LOCATION: LEG

## 2023-08-17 ASSESSMENT — PAIN DESCRIPTION - PAIN TYPE: TYPE: CHRONIC PAIN

## 2023-08-17 ASSESSMENT — PAIN DESCRIPTION - FREQUENCY: FREQUENCY: CONTINUOUS

## 2023-08-17 ASSESSMENT — PAIN DESCRIPTION - DESCRIPTORS: DESCRIPTORS: ACHING;BURNING

## 2023-08-17 ASSESSMENT — PAIN SCALES - GENERAL: PAINLEVEL_OUTOF10: 8

## 2023-08-17 NOTE — DISCHARGE INSTRUCTIONS
[address] [facility] Physician Orders and Discharge Instructions  The [address] [place] [address] Road [facility] [address], [address] [place] [address] East,4[address] Floor  Telephone: [phone]    NAME:  Krystyna Reynolds:  1965  MEDICAL RECORD NUMBER:  [id number]  DATE:  8/17/2023      Wound care:  Continue to follow the instructions and recommendations from your last doctor visit. The dressing(s) applied is the same as your last visit. Please refer to your last discharge instruction for the information on your wound care. If there were any changes made, please follow the instructions as written here:     Future Appointments     Future Appointments   Date Time Provider [address]   8/18/2023  1:30  [address] [facility] HOD   8/21/2023 10:15 AM [facility]   8/23/2023  1:00 PM [facility] 3 [facility] INF None   8/30/2023 11:00 AM Maru Calle MD [facility]   8/30/2023  1:00 PM [facility] 3 [facility] INF None   9/6/2023  1:00 PM [facility] LAB 3 [facility] INF None   9/13/2023  1:00 PM [facility] LAB 3 [facility] INF None   9/20/2023  1:00 PM [facility] [address] INF None           Your nurse  is:  Beba Parisi     Electronically signed by Jean Marie Michel RN on 8/17/2023 at [address] Information: Should you experience any significant changes in your wound(s) or have questions about your wound care, please contact the [place] at [phone]. Our hours vary so please leave a message. Please give us 24-48 hours to return your call. If you need help with your wounds and cannot wait until we are available, contact your PCP or go to the hospital emergency room.        Physician orders by:  {[id number]::\"MATHEUS Giles\",\"Dr Page\",\"Dr Arabella Martin\",\"Dr Danie Martin\",\"Dr Myron Crane\"}        The information contained in the After Visit Summary has been reviewed with me, the patient

## 2023-08-17 NOTE — PROGRESS NOTES
Multilayer Compression Wrap   (Not Unna) Below the Knee    NAME:  Denia Randall  YOB: 1965  MEDICAL RECORD NUMBER:  [id number]  DATE:  8/17/2023       Removed old Multilayer wrap if present and washed leg with mild soap/water. Applied moisturizing agent to dry skin as needed. Applied primary and secondary dressing as ordered    Applied multilayered dressing below the knee to Right lower leg(s)  (4 Layer Compression Wrap ) . Instructed patient/caregiver not to remove dressing and to keep it clean and dry. Instructed patient/caregiver on complications to report to provider, such as pain, numbness in toes, heavy drainage, and slippage of dressing. Instructed patient on purpose of compression dressing and on activity and exercise recommendations.    Applied per   Guidelines    Electronically signed by Randolph Mackey RN on 8/17/2023 at 8:59 AM Normal rate, regular rhythm.  Heart sounds S1, S2.  No murmurs, rubs or gallops.

## 2023-08-18 ENCOUNTER — TELEPHONE (OUTPATIENT)
Dept: PHARMACY | Age: 58
End: 2023-08-18

## 2023-08-18 NOTE — TELEPHONE ENCOUNTER
Patient called to cancel INR check on 8/18. He reports he is being followed by ID for IV abx and got his INR checked on 8/2 which was 2.37. Sent message to Krystina Stephens, pharmacist with ID team to follow-up on monitoring plan while on abx.      Jalil Greer, PharmD, [facility]  [facility]  [place]: [phone]  [place]: [phone]  8/18/2023 11:40 AM

## 2023-08-21 ENCOUNTER — HOSPITAL ENCOUNTER (OUTPATIENT)
Dept: WOUND CARE | Age: 58
Discharge: HOME | End: 2023-08-21
Payer: COMMERCIAL

## 2023-08-21 VITALS
HEART RATE: 67 BPM | RESPIRATION RATE: 16 BRPM | DIASTOLIC BLOOD PRESSURE: 72 MMHG | TEMPERATURE: 97.4 F | SYSTOLIC BLOOD PRESSURE: 131 MMHG

## 2023-08-21 DIAGNOSIS — I89.0 LYMPHEDEMA: ICD-10-CM

## 2023-08-21 DIAGNOSIS — I83.009 VENOUS ULCER (HCC): ICD-10-CM

## 2023-08-21 DIAGNOSIS — L97.909 VENOUS ULCER (HCC): ICD-10-CM

## 2023-08-21 DIAGNOSIS — L97.919 IDIOPATHIC CHRONIC VENOUS HYPERTENSION OF RIGHT LOWER EXTREMITY WITH ULCER (HCC): ICD-10-CM

## 2023-08-21 DIAGNOSIS — I87.311 IDIOPATHIC CHRONIC VENOUS HYPERTENSION OF RIGHT LOWER EXTREMITY WITH ULCER (HCC): ICD-10-CM

## 2023-08-21 DIAGNOSIS — I89.0 CHRONIC ACQUIRED LYMPHEDEMA: ICD-10-CM

## 2023-08-21 DIAGNOSIS — Z91.199 NONCOMPLIANCE: Primary | ICD-10-CM

## 2023-08-21 PROCEDURE — 6370000000 HC RX 637 (ALT 250 FOR IP): Performed by: SPECIALIST

## 2023-08-21 PROCEDURE — 29581 APPL MULTLAYER CMPRN SYS LEG: CPT

## 2023-08-21 PROCEDURE — 11042 DBRDMT SUBQ TIS 1ST 20SQCM/<: CPT

## 2023-08-21 PROCEDURE — 11045 DBRDMT SUBQ TISS EACH ADDL: CPT

## 2023-08-21 RX ORDER — GENTAMICIN SULFATE 1 MG/G
OINTMENT TOPICAL ONCE
OUTPATIENT
Start: 2023-08-21 | End: 2023-08-21

## 2023-08-21 RX ORDER — IBUPROFEN 200 MG
TABLET ORAL ONCE
OUTPATIENT
Start: 2023-08-21 | End: 2023-08-21

## 2023-08-21 RX ORDER — SODIUM CHLOR/HYPOCHLOROUS ACID 0.033 %
SOLUTION, IRRIGATION IRRIGATION ONCE
OUTPATIENT
Start: 2023-08-21 | End: 2023-08-21

## 2023-08-21 RX ORDER — BACITRACIN ZINC AND POLYMYXIN B SULFATE 500; 1000 [USP'U]/G; [USP'U]/G
OINTMENT TOPICAL ONCE
OUTPATIENT
Start: 2023-08-21 | End: 2023-08-21

## 2023-08-21 RX ORDER — LIDOCAINE 40 MG/G
CREAM TOPICAL ONCE
OUTPATIENT
Start: 2023-08-21 | End: 2023-08-21

## 2023-08-21 RX ORDER — LIDOCAINE HYDROCHLORIDE 40 MG/ML
SOLUTION TOPICAL ONCE
Status: CANCELLED | OUTPATIENT
Start: 2023-08-21 | End: 2023-08-21

## 2023-08-21 RX ORDER — LIDOCAINE HYDROCHLORIDE 20 MG/ML
JELLY TOPICAL ONCE
OUTPATIENT
Start: 2023-08-21 | End: 2023-08-21

## 2023-08-21 RX ORDER — GINSENG 100 MG
CAPSULE ORAL ONCE
OUTPATIENT
Start: 2023-08-21 | End: 2023-08-21

## 2023-08-21 RX ORDER — LIDOCAINE 50 MG/G
OINTMENT TOPICAL ONCE
OUTPATIENT
Start: 2023-08-21 | End: 2023-08-21

## 2023-08-21 RX ORDER — BETAMETHASONE DIPROPIONATE 0.05 %
OINTMENT (GRAM) TOPICAL ONCE
OUTPATIENT
Start: 2023-08-21 | End: 2023-08-21

## 2023-08-21 RX ORDER — CLOBETASOL PROPIONATE 0.5 MG/G
OINTMENT TOPICAL ONCE
OUTPATIENT
Start: 2023-08-21 | End: 2023-08-21

## 2023-08-21 RX ORDER — LIDOCAINE HYDROCHLORIDE 40 MG/ML
SOLUTION TOPICAL ONCE
Status: COMPLETED | OUTPATIENT
Start: 2023-08-21 | End: 2023-08-21

## 2023-08-21 RX ADMIN — LIDOCAINE HYDROCHLORIDE: 40 SOLUTION TOPICAL at 10:40

## 2023-08-21 ASSESSMENT — PAIN DESCRIPTION - FREQUENCY: FREQUENCY: CONTINUOUS

## 2023-08-21 ASSESSMENT — PAIN DESCRIPTION - ORIENTATION: ORIENTATION: RIGHT

## 2023-08-21 ASSESSMENT — PAIN DESCRIPTION - DESCRIPTORS: DESCRIPTORS: ACHING;BURNING

## 2023-08-21 ASSESSMENT — PAIN SCALES - GENERAL: PAINLEVEL_OUTOF10: 5

## 2023-08-21 ASSESSMENT — PAIN DESCRIPTION - LOCATION: LOCATION: LEG

## 2023-08-21 NOTE — PROGRESS NOTES
[facility]  Progress Note and Procedure Note      Roberth Leslie  MEDICAL RECORD NUMBER:  [id number]  AGE: 62 y.o. GENDER: male  : 1965  EPISODE DATE:  2023    Subjective:     Chief Complaint   Patient presents with    Wound Check     Right lower leg           HISTORY of PRESENT ILLNESS HPI     Roberth Leslie is a 62 y.o. male who presents today for wound/ulcer evaluation. History of Wound Context: Continues follow-up for venous insufficiency with ulceration and massive edema and lymphedema. Remains on IV Zosyn per Dr. Joel Camacho from infectious disease. Patient states there has been decreasing pain this past week. Remains in 4 layer Coban compression.   He is occasionally using his lymphedema pumps  Wound/Ulcer Pain Timing/Severity: intermittent  Quality of pain: throbbing  Severity:  2 / 10   Modifying Factors: None  Associated Signs/Symptoms: edema, erythema, and drainage    Ulcer Identification:  Ulcer Type: venous    Contributing Factors: edema, venous stasis, lymphedema, and obesity    Acute Wound: N/A not an acute wound    PAST MEDICAL HISTORY        Diagnosis Date    DVT (deep venous thrombosis) (HCC)     Hx of blood clots     Pain and swelling of right lower leg        PAST SURGICAL HISTORY    Past Surgical History:   Procedure Laterality Date    BALLOON ANGIOPLASTY, ARTERY Right 2017    at [address], ESOPHAGUS      SKIN SPLIT GRAFT Right        FAMILY HISTORY    Family History   Problem Relation Age of Onset    Diabetes Mother     Heart Attack Father        SOCIAL HISTORY    Social History     Tobacco Use    Smoking status: Never    Smokeless tobacco: Never   Vaping Use    Vaping Use: Never used   Substance Use Topics    Alcohol use: No    Drug use: No       ALLERGIES    No Known Allergies    MEDICATIONS    Current Outpatient Medications on File Prior to Encounter   Medication Sig Dispense Refill    piperacillin-tazobactam (ZOSYN) infusion Infuse

## 2023-08-21 NOTE — PROGRESS NOTES
Multilayer Compression Wrap   (Not Unna) Below the Knee    NAME:  Jerad Muñoz  YOB: 1965  MEDICAL RECORD NUMBER:  [id number]  DATE:  8/21/2023       Removed old Multilayer wrap if present and washed leg with mild soap/water. Applied moisturizing agent to dry skin as needed. Applied primary and secondary dressing as ordered    Applied multilayered dressing below the knee to Right lower leg(s)  (4 Layer Compression Wrap ) . Instructed patient/caregiver not to remove dressing and to keep it clean and dry. Instructed patient/caregiver on complications to report to provider, such as pain, numbness in toes, heavy drainage, and slippage of dressing. Instructed patient on purpose of compression dressing and on activity and exercise recommendations.    Applied per   Guidelines    Electronically signed by Lena Quintana RN on 8/21/2023 at 11:28 AM

## 2023-08-21 NOTE — DISCHARGE INSTRUCTIONS
411 [facility] Physician Orders and Discharge Instructions  [address] [facility]   [address]  Telephone: [phone]  NAME:  Renaldo Stewart  YOB: 1965  MEDICAL RECORD NUMBER:  [id number]  DATE: 8/21/23     Return Appointment:  Return Appointment: With Abida Carvajal MD  in  1 [facility])  [x] Return Appointment for a Wound Assessment with the nurse on: 08/24/23    Future Appointments   Date Time Provider [address]   8/23/2023  1:00 PM [address] LAB 3 [facility] INF None   8/30/2023 11:00 AM Gilma Bridges MD [facility]   8/30/2023  1:00 PM [facility] LAB 3 [facility] INF None   9/6/2023  1:00 PM [facility] LAB 3 [facility] INF None   9/13/2023  1:00 PM [facility] LAB 3 [facility] INF None   9/20/2023  1:00 PM [address] LAB 3 [facility]: none    Medically necessary services for evaluation and treatment: []Skilled Nursing (using clean technique) []PT (Eval & Treat) []OT (Eval & Treat) []Social Work []Dietician []Other:      Wound care instructions: If you smoke we ask that you refrain from smoking. Smoking inhibits wounds from healing. When taking antibiotics take the entire prescription as ordered. Do not stop taking until medication is all gone unless otherwise instructed. Exercise as tolerated. Keep weight off wounds and reposition every 2 hours if applicable. Do not get wounds wet in the bath or shower unless otherwise instructed by your physician. If your wound is on your foot or leg, you may purchase a cast bag. Please ask at the pharmacy. Wash hands with soap and water prior to and after every dressing change. [x]Wash wounds with: Vashe wash - Apply enough Vashe to soak a piece of gauze and place on wound bed for 5-10 minutes. No need to rinse after soaking.   [x]Kiah wound Topical Treatments: Do not apply lotions, creams, or ointments to the skin around the wound bed unless directed as

## 2023-08-23 ENCOUNTER — HOSPITAL ENCOUNTER (OUTPATIENT)
Dept: ONCOLOGY | Age: 58
Setting detail: INFUSION SERIES
Discharge: HOME | End: 2023-08-23
Payer: COMMERCIAL

## 2023-08-23 DIAGNOSIS — Z45.2 PICC (PERIPHERALLY INSERTED CENTRAL CATHETER) IN PLACE: Primary | ICD-10-CM

## 2023-08-23 LAB
ALBUMIN SERPL-MCNC: 3.7 G/DL (ref 3.4–5)
ALBUMIN/GLOB SERPL: 1 {RATIO} (ref 1.1–2.2)
ALP SERPL-CCNC: 73 U/L (ref 40–129)
ALT SERPL-CCNC: 17 U/L (ref 10–40)
ANION GAP SERPL CALCULATED.3IONS-SCNC: 10 MMOL/L (ref 3–16)
AST SERPL-CCNC: 16 U/L (ref 15–37)
BASOPHILS # BLD: 0 K/UL (ref 0–0.2)
BASOPHILS NFR BLD: 0.2 %
BILIRUB SERPL-MCNC: 0.4 MG/DL (ref 0–1)
BUN SERPL-MCNC: 14 MG/DL (ref 7–20)
CALCIUM SERPL-MCNC: 8.7 MG/DL (ref 8.3–10.6)
CHLORIDE SERPL-SCNC: 103 MMOL/L (ref 99–110)
CO2 SERPL-SCNC: 24 MMOL/L (ref 21–32)
CREAT SERPL-MCNC: 1.2 MG/DL (ref 0.9–1.3)
CRP SERPL-MCNC: 29.1 MG/L (ref 0–5.1)
DEPRECATED RDW RBC AUTO: 16.7 % (ref 12.4–15.4)
EOSINOPHIL # BLD: 0.3 K/UL (ref 0–0.6)
EOSINOPHIL NFR BLD: 7.1 %
ERYTHROCYTE [SEDIMENTATION RATE] IN BLOOD BY WESTERGREN METHOD: 71 MM/HR (ref 0–20)
GFR SERPLBLD CREATININE-BSD FMLA CKD-EPI: >60 ML/MIN/{1.73_M2}
GLUCOSE SERPL-MCNC: 114 MG/DL (ref 70–99)
HCT VFR BLD AUTO: 33.9 % (ref 40.5–52.5)
HGB BLD-MCNC: 11.3 G/DL (ref 13.5–17.5)
LYMPHOCYTES # BLD: 1.2 K/UL (ref 1–5.1)
LYMPHOCYTES NFR BLD: 28.5 %
MCH RBC QN AUTO: 26.9 PG (ref 26–34)
MCHC RBC AUTO-ENTMCNC: 33.2 G/DL (ref 31–36)
MCV RBC AUTO: 80.9 FL (ref 80–100)
MONOCYTES # BLD: 0.4 K/UL (ref 0–1.3)
MONOCYTES NFR BLD: 10.1 %
NEUTROPHILS # BLD: 2.2 K/UL (ref 1.7–7.7)
NEUTROPHILS NFR BLD: 54.1 %
PLATELET # BLD AUTO: 199 K/UL (ref 135–450)
PMV BLD AUTO: 6.7 FL (ref 5–10.5)
POTASSIUM SERPL-SCNC: 4.1 MMOL/L (ref 3.5–5.1)
PROT SERPL-MCNC: 7.3 G/DL (ref 6.4–8.2)
RBC # BLD AUTO: 4.19 M/UL (ref 4.2–5.9)
SODIUM SERPL-SCNC: 137 MMOL/L (ref 136–145)
WBC # BLD AUTO: 4.1 K/UL (ref 4–11)

## 2023-08-23 PROCEDURE — 6360000002 HC RX W HCPCS: Performed by: INTERNAL MEDICINE

## 2023-08-23 PROCEDURE — 86140 C-REACTIVE PROTEIN: CPT

## 2023-08-23 PROCEDURE — 2580000003 HC RX 258: Performed by: INTERNAL MEDICINE

## 2023-08-23 PROCEDURE — 80053 COMPREHEN METABOLIC PANEL: CPT

## 2023-08-23 PROCEDURE — 36593 DECLOT VASCULAR DEVICE: CPT

## 2023-08-23 PROCEDURE — 85652 RBC SED RATE AUTOMATED: CPT

## 2023-08-23 PROCEDURE — 85025 COMPLETE CBC W/AUTO DIFF WBC: CPT

## 2023-08-23 RX ORDER — HEPARIN 100 UNIT/ML
500 SYRINGE INTRAVENOUS PRN
Status: DISCONTINUED | OUTPATIENT
Start: 2023-08-23 | End: 2023-08-24 | Stop reason: HOSPADM

## 2023-08-23 RX ORDER — HEPARIN 100 UNIT/ML
500 SYRINGE INTRAVENOUS PRN
OUTPATIENT
Start: 2023-08-30

## 2023-08-23 RX ORDER — SODIUM CHLORIDE 9 MG/ML
25 INJECTION, SOLUTION INTRAVENOUS PRN
OUTPATIENT
Start: 2023-08-30

## 2023-08-23 RX ORDER — SODIUM CHLORIDE 0.9 % (FLUSH) 0.9 %
5-40 SYRINGE (ML) INJECTION PRN
OUTPATIENT
Start: 2023-08-30

## 2023-08-23 RX ADMIN — ALTEPLASE 2 MG: 2.2 INJECTION, POWDER, LYOPHILIZED, FOR SOLUTION INTRAVENOUS at 13:48

## 2023-08-24 ENCOUNTER — TELEPHONE (OUTPATIENT)
Dept: INFECTIOUS DISEASES | Age: 58
End: 2023-08-24

## 2023-08-24 ENCOUNTER — HOSPITAL ENCOUNTER (OUTPATIENT)
Dept: WOUND CARE | Age: 58
Discharge: HOME | End: 2023-08-24
Payer: COMMERCIAL

## 2023-08-24 VITALS
DIASTOLIC BLOOD PRESSURE: 86 MMHG | HEART RATE: 80 BPM | TEMPERATURE: 97.5 F | SYSTOLIC BLOOD PRESSURE: 146 MMHG | RESPIRATION RATE: 16 BRPM

## 2023-08-24 DIAGNOSIS — L97.919 IDIOPATHIC CHRONIC VENOUS HYPERTENSION OF RIGHT LOWER EXTREMITY WITH ULCER (HCC): ICD-10-CM

## 2023-08-24 DIAGNOSIS — I87.311 IDIOPATHIC CHRONIC VENOUS HYPERTENSION OF RIGHT LOWER EXTREMITY WITH ULCER (HCC): ICD-10-CM

## 2023-08-24 DIAGNOSIS — I89.0 LYMPHEDEMA: ICD-10-CM

## 2023-08-24 DIAGNOSIS — I83.009 VENOUS ULCER (HCC): ICD-10-CM

## 2023-08-24 DIAGNOSIS — Z91.199 NONCOMPLIANCE: Primary | ICD-10-CM

## 2023-08-24 DIAGNOSIS — I89.0 CHRONIC ACQUIRED LYMPHEDEMA: ICD-10-CM

## 2023-08-24 DIAGNOSIS — L97.909 VENOUS ULCER (HCC): ICD-10-CM

## 2023-08-24 PROCEDURE — 29581 APPL MULTLAYER CMPRN SYS LEG: CPT

## 2023-08-24 RX ORDER — IBUPROFEN 200 MG
TABLET ORAL ONCE
OUTPATIENT
Start: 2023-08-24 | End: 2023-08-24

## 2023-08-24 RX ORDER — LIDOCAINE 50 MG/G
OINTMENT TOPICAL ONCE
OUTPATIENT
Start: 2023-08-24 | End: 2023-08-24

## 2023-08-24 RX ORDER — LIDOCAINE HYDROCHLORIDE 40 MG/ML
SOLUTION TOPICAL ONCE
OUTPATIENT
Start: 2023-08-24 | End: 2023-08-24

## 2023-08-24 RX ORDER — LIDOCAINE HYDROCHLORIDE 20 MG/ML
JELLY TOPICAL ONCE
OUTPATIENT
Start: 2023-08-24 | End: 2023-08-24

## 2023-08-24 RX ORDER — BACITRACIN ZINC AND POLYMYXIN B SULFATE 500; 1000 [USP'U]/G; [USP'U]/G
OINTMENT TOPICAL ONCE
OUTPATIENT
Start: 2023-08-24 | End: 2023-08-24

## 2023-08-24 RX ORDER — LIDOCAINE HYDROCHLORIDE 40 MG/ML
SOLUTION TOPICAL ONCE
Status: DISCONTINUED | OUTPATIENT
Start: 2023-08-24 | End: 2023-08-25 | Stop reason: HOSPADM

## 2023-08-24 RX ORDER — CLOBETASOL PROPIONATE 0.5 MG/G
OINTMENT TOPICAL ONCE
OUTPATIENT
Start: 2023-08-24 | End: 2023-08-24

## 2023-08-24 RX ORDER — LIDOCAINE 40 MG/G
CREAM TOPICAL ONCE
OUTPATIENT
Start: 2023-08-24 | End: 2023-08-24

## 2023-08-24 RX ORDER — GENTAMICIN SULFATE 1 MG/G
OINTMENT TOPICAL ONCE
OUTPATIENT
Start: 2023-08-24 | End: 2023-08-24

## 2023-08-24 RX ORDER — SODIUM CHLOR/HYPOCHLOROUS ACID 0.033 %
SOLUTION, IRRIGATION IRRIGATION ONCE
OUTPATIENT
Start: 2023-08-24 | End: 2023-08-24

## 2023-08-24 RX ORDER — BETAMETHASONE DIPROPIONATE 0.05 %
OINTMENT (GRAM) TOPICAL ONCE
OUTPATIENT
Start: 2023-08-24 | End: 2023-08-24

## 2023-08-24 RX ORDER — GINSENG 100 MG
CAPSULE ORAL ONCE
OUTPATIENT
Start: 2023-08-24 | End: 2023-08-24

## 2023-08-24 ASSESSMENT — PAIN DESCRIPTION - PAIN TYPE: TYPE: CHRONIC PAIN

## 2023-08-24 ASSESSMENT — PAIN DESCRIPTION - FREQUENCY: FREQUENCY: CONTINUOUS

## 2023-08-24 ASSESSMENT — PAIN SCALES - GENERAL: PAINLEVEL_OUTOF10: 6

## 2023-08-24 ASSESSMENT — PAIN DESCRIPTION - DESCRIPTORS: DESCRIPTORS: BURNING

## 2023-08-24 ASSESSMENT — PAIN DESCRIPTION - ORIENTATION: ORIENTATION: RIGHT

## 2023-08-24 ASSESSMENT — PAIN DESCRIPTION - LOCATION: LOCATION: LEG

## 2023-08-24 NOTE — TELEPHONE ENCOUNTER
Left message for pt   My name, Dr Serafin Matthews name, my direct number  Update on how pt is doing and if any follow appts coming up

## 2023-08-24 NOTE — DISCHARGE INSTRUCTIONS
411 [facility] Physician Orders and Discharge Instructions  The [address] [place] [address] Road [facility] [address], [address] [facility],[address]  Telephone: [phone]    NAME:  Giovanni Stock:  1965  MEDICAL RECORD NUMBER:  [id number]  DATE:  8/24/2023      Wound care:  Continue to follow the instructions and recommendations from your last doctor visit. The dressing(s) applied is the same as your last visit. Please refer to your last discharge instruction for the information on your wound care. If there were any changes made, please follow the instructions as written here:     Future Appointments     Future Appointments   Date Time Provider [address]   8/28/2023 10:15 AM Renard Keller  [facility]   8/30/2023 11:00 AM Sarthak Carcamo MD [facility]   8/30/2023  1:00  [facility] LAB 3 [facility] INF None   9/6/2023  1:00 PM [facility] LAB 3 [facility] INF None   9/13/2023  1:00 PM [facility] LAB 3 [facility] INF None   9/20/2023  1:00  [facility] LAB [address] INF None           Your nurse  is:  Arnaldo Bedoya     Electronically signed by Chitra Drew RN on 8/24/2023 at [address] [facility] Information: Should you experience any significant changes in your wound(s) or have questions about your wound care, please contact the [facility] at [phone]. Our hours vary so please leave a message. Please give us 24-48 hours to return your call. If you need help with your wounds and cannot wait until we are available, contact your PCP or go to the hospital emergency room. Physician orders by:  Dr. Alex Simental        The information contained in the After Visit Summary has been reviewed with me, the patient and/or responsible adult, by my health care provider(s). I had the opportunity to ask questions regarding this information.   I have elected to receive;      [] Patient unable to sign Discharge

## 2023-08-24 NOTE — TELEPHONE ENCOUNTER
LVM #2 with patient to schedule INR check. ID is NOT managing at this time. They only ordered an INR prior to picc line placement.      Cristian Oseguera, PharmD, [facility]  [facility]  [place]: [phone]  [place]: [phone]  8/24/2023 1:45 PM

## 2023-08-25 NOTE — TELEPHONE ENCOUNTER
OPAT Nurse Coordinator Weekly Update Note    Current OPAT plan:  IV pip/tazo 13.5g/24hr as continuous infusion - 8/30 (4 weeks)      Diagnosis:  Infected R LE ulceration      Assessment:  Pt is doing well  Pt is wanting to know if Dr Haider Whittington can do a swab culture on Monday - Can order be placed?       Follow up appts:   Pt sees Dr Raymundo Corrales on Monday and has wound center appt at that time as well   F/u with Dr. Joselyn [place] on 8/30

## 2023-08-25 NOTE — TELEPHONE ENCOUNTER
Unless wound is looking worse/draining or he is not feeling well, no indication for a superficial wound swab. Will likely only \"muddy\" the picture and result with normal skin jody contaminates. If wound care MD feels the need to re-culture with a swab, they can do so but we do not need a repeat and will not order it.      Michelle Grande, PharmD, [facility]  Phone: [phone] (also available on [employer])  Fax: [phone]    For Pharmacy [id number] [place] [address]    Program: Medical Group  CPA in place: Yes  Time Spent (min): 5

## 2023-08-25 NOTE — TELEPHONE ENCOUNTER
LMV for pt   My name, Dr Heraclio Samayoa name my number  we do not need a repeat swab done next week

## 2023-08-28 ENCOUNTER — ANTI-COAG VISIT (OUTPATIENT)
Dept: PHARMACY | Age: 58
End: 2023-08-28
Payer: COMMERCIAL

## 2023-08-28 ENCOUNTER — HOSPITAL ENCOUNTER (OUTPATIENT)
Dept: WOUND CARE | Age: 58
Discharge: HOME | End: 2023-08-28
Payer: COMMERCIAL

## 2023-08-28 VITALS
HEART RATE: 65 BPM | RESPIRATION RATE: 16 BRPM | DIASTOLIC BLOOD PRESSURE: 76 MMHG | SYSTOLIC BLOOD PRESSURE: 136 MMHG | TEMPERATURE: 97.8 F

## 2023-08-28 DIAGNOSIS — Z91.199 NONCOMPLIANCE: Primary | ICD-10-CM

## 2023-08-28 DIAGNOSIS — I89.0 CHRONIC ACQUIRED LYMPHEDEMA: ICD-10-CM

## 2023-08-28 DIAGNOSIS — I83.009 VENOUS ULCER (HCC): ICD-10-CM

## 2023-08-28 DIAGNOSIS — I89.0 LYMPHEDEMA: ICD-10-CM

## 2023-08-28 DIAGNOSIS — L97.919 IDIOPATHIC CHRONIC VENOUS HYPERTENSION OF RIGHT LOWER EXTREMITY WITH ULCER (HCC): ICD-10-CM

## 2023-08-28 DIAGNOSIS — I82.5Z1 CHRONIC VENOUS EMBOLISM AND THROMBOSIS OF DEEP VESSELS OF DISTAL END OF RIGHT LOWER EXTREMITY (HCC): Primary | ICD-10-CM

## 2023-08-28 DIAGNOSIS — I87.311 IDIOPATHIC CHRONIC VENOUS HYPERTENSION OF RIGHT LOWER EXTREMITY WITH ULCER (HCC): ICD-10-CM

## 2023-08-28 DIAGNOSIS — L97.909 VENOUS ULCER (HCC): ICD-10-CM

## 2023-08-28 LAB — INTERNATIONAL NORMALIZATION RATIO, POC: 2.7

## 2023-08-28 PROCEDURE — 11045 DBRDMT SUBQ TISS EACH ADDL: CPT

## 2023-08-28 PROCEDURE — 29581 APPL MULTLAYER CMPRN SYS LEG: CPT

## 2023-08-28 PROCEDURE — 6370000000 HC RX 637 (ALT 250 FOR IP): Performed by: SPECIALIST

## 2023-08-28 PROCEDURE — 85610 PROTHROMBIN TIME: CPT | Performed by: PHARMACIST

## 2023-08-28 PROCEDURE — 11042 DBRDMT SUBQ TIS 1ST 20SQCM/<: CPT

## 2023-08-28 PROCEDURE — 99211 OFF/OP EST MAY X REQ PHY/QHP: CPT | Performed by: PHARMACIST

## 2023-08-28 RX ORDER — BACITRACIN ZINC AND POLYMYXIN B SULFATE 500; 1000 [USP'U]/G; [USP'U]/G
OINTMENT TOPICAL ONCE
OUTPATIENT
Start: 2023-08-28 | End: 2023-08-28

## 2023-08-28 RX ORDER — LIDOCAINE HYDROCHLORIDE 40 MG/ML
SOLUTION TOPICAL ONCE
OUTPATIENT
Start: 2023-08-28 | End: 2023-08-28

## 2023-08-28 RX ORDER — GINSENG 100 MG
CAPSULE ORAL ONCE
OUTPATIENT
Start: 2023-08-28 | End: 2023-08-28

## 2023-08-28 RX ORDER — BETAMETHASONE DIPROPIONATE 0.05 %
OINTMENT (GRAM) TOPICAL ONCE
OUTPATIENT
Start: 2023-08-28 | End: 2023-08-28

## 2023-08-28 RX ORDER — SODIUM CHLOR/HYPOCHLOROUS ACID 0.033 %
SOLUTION, IRRIGATION IRRIGATION ONCE
OUTPATIENT
Start: 2023-08-28 | End: 2023-08-28

## 2023-08-28 RX ORDER — IBUPROFEN 200 MG
TABLET ORAL ONCE
OUTPATIENT
Start: 2023-08-28 | End: 2023-08-28

## 2023-08-28 RX ORDER — LIDOCAINE HYDROCHLORIDE 40 MG/ML
SOLUTION TOPICAL ONCE
Status: COMPLETED | OUTPATIENT
Start: 2023-08-28 | End: 2023-08-28

## 2023-08-28 RX ORDER — CLOBETASOL PROPIONATE 0.5 MG/G
OINTMENT TOPICAL ONCE
OUTPATIENT
Start: 2023-08-28 | End: 2023-08-28

## 2023-08-28 RX ORDER — LIDOCAINE HYDROCHLORIDE 20 MG/ML
JELLY TOPICAL ONCE
OUTPATIENT
Start: 2023-08-28 | End: 2023-08-28

## 2023-08-28 RX ORDER — LIDOCAINE 50 MG/G
OINTMENT TOPICAL ONCE
OUTPATIENT
Start: 2023-08-28 | End: 2023-08-28

## 2023-08-28 RX ORDER — GENTAMICIN SULFATE 1 MG/G
OINTMENT TOPICAL ONCE
OUTPATIENT
Start: 2023-08-28 | End: 2023-08-28

## 2023-08-28 RX ORDER — LIDOCAINE 40 MG/G
CREAM TOPICAL ONCE
OUTPATIENT
Start: 2023-08-28 | End: 2023-08-28

## 2023-08-28 RX ADMIN — LIDOCAINE HYDROCHLORIDE: 40 SOLUTION TOPICAL at 10:58

## 2023-08-28 ASSESSMENT — PAIN SCALES - GENERAL: PAINLEVEL_OUTOF10: 4

## 2023-08-28 ASSESSMENT — PAIN DESCRIPTION - ORIENTATION: ORIENTATION: RIGHT

## 2023-08-28 ASSESSMENT — PAIN DESCRIPTION - LOCATION: LOCATION: LEG

## 2023-08-28 NOTE — PROGRESS NOTES
Multilayer Compression Wrap   (Not Unna) Below the Knee    NAME:  Isidra Sanches  YOB: 1965  MEDICAL RECORD NUMBER:  [id number]  DATE:  8/28/2023       Removed old Multilayer wrap if present and washed leg with mild soap/water. Applied moisturizing agent to dry skin as needed. Applied primary and secondary dressing as ordered    Applied multilayered dressing below the knee to Right lower leg(s)  (4 Layer Compression Wrap ) . Instructed patient/caregiver not to remove dressing and to keep it clean and dry. Instructed patient/caregiver on complications to report to provider, such as pain, numbness in toes, heavy drainage, and slippage of dressing. Instructed patient on purpose of compression dressing and on activity and exercise recommendations.    Applied per   Guidelines    Electronically signed by Cooper Barber RN on 8/28/2023 at 12:15 PM

## 2023-08-28 NOTE — DISCHARGE INSTRUCTIONS
411 [facility] Physician Orders and Discharge Instructions  [address] [place] [address] [place] [address]. [place] [address]  Telephone: [phone]  NAME:  Mariana Monroe  YOB: 1965  MEDICAL RECORD NUMBER:  [id number]  DATE: 8/28/23     Return Appointment:  Return Appointment: With Júnior Diaz MD  in  1 [facility])  [] Return Appointment for a Wound Assessment with the nurse on:     Future Appointments   Date Time Provider [address]   8/30/2023 11:00 AM Kiah MD [facility]   8/30/2023  1:00 PM [address] [place] LAB [address] [place] INF None   9/6/2023  1:00 PM [address] [place] LAB [address] [place] INF None   9/13/2023  1:00 PM [address] LAB [address] [place] INF None   9/20/2023  1:00 PM [address] LAB [address] [place] INF None   10/11/2023  1:30 PM [address] [facility]: none    Medically necessary services for evaluation and treatment: []Skilled Nursing (using clean technique) []PT (Eval & Treat) []OT (Eval & Treat) []Social Work []Dietician []Other:      Wound care instructions: If you smoke we ask that you refrain from smoking. Smoking inhibits wounds from healing. When taking antibiotics take the entire prescription as ordered. Do not stop taking until medication is all gone unless otherwise instructed. Exercise as tolerated. Keep weight off wounds and reposition every 2 hours if applicable. Do not get wounds wet in the bath or shower unless otherwise instructed by your physician. If your wound is on your foot or leg, you may purchase a cast bag. Please ask at the pharmacy. Wash hands with soap and water prior to and after every dressing change. [x]Wash wounds with: Vashe wash - Apply enough Vashe to soak a piece of gauze and place on wound bed for 5-10 minutes. No need to rinse after soaking.   [x]Kiah wound Topical Treatments: Do not apply lotions, creams, or ointments to the skin around the wound bed unless

## 2023-08-28 NOTE — PROGRESS NOTES
[facility]  Progress Note and Procedure Note      Scott Ross  MEDICAL RECORD NUMBER:  [id number]  AGE: 62 y.o. GENDER: male  : 1965  EPISODE DATE:  2023    Subjective:     Chief Complaint   Patient presents with    Wound Check     F/u RLE         HISTORY of PRESENT ILLNESS HPI     Scott Ross is a 62 y.o. male who presents today for wound/ulcer evaluation. History of Wound Context: Continues follow-up for venous insufficiency with ulceration and massive edema and lymphedema. Remains on IV Zosyn per Dr. Ivonne Tay from infectious disease. Patient states there has been decreasing pain this past week. Remains in 4 layer Coban compression.   He is occasionally using his lymphedema pumps  Wound/Ulcer Pain Timing/Severity: mild  Quality of pain: sharp  Severity:  2 / 10   Modifying Factors: None  Associated Signs/Symptoms: edema and drainage    Ulcer Identification:  Ulcer Type: venous    Contributing Factors: edema, venous stasis, lymphedema, and obesity    Acute Wound: N/A not an acute wound    PAST MEDICAL HISTORY        Diagnosis Date    DVT (deep venous thrombosis) (HCC)     Hx of blood clots     Pain and swelling of right lower leg        PAST SURGICAL HISTORY    Past Surgical History:   Procedure Laterality Date    BALLOON ANGIOPLASTY, ARTERY Right 2017    at [address] [facility] [address], ESOPHAGUS      SKIN SPLIT GRAFT Right        FAMILY HISTORY    Family History   Problem Relation Age of Onset    Diabetes Mother     Heart Attack Father        SOCIAL HISTORY    Social History     Tobacco Use    Smoking status: Never    Smokeless tobacco: Never   Vaping Use    Vaping Use: Never used   Substance Use Topics    Alcohol use: No    Drug use: No       ALLERGIES    No Known Allergies    MEDICATIONS    Current Outpatient Medications on File Prior to Encounter   Medication Sig Dispense Refill    piperacillin-tazobactam (ZOSYN) infusion Infuse 13.5 g intravenously every 24

## 2023-08-28 NOTE — PROGRESS NOTES
goal, no change  7.5 7.5 7.5 7.5 7.5 7.5 7.5 52.5  2/17 3.0 At goal, no change  7.5 7.5 7.5 7.5 7.5 7.5 7.5 52.5  1/6 2.8 At goal, no change  7.5 7.5 7.5 7.5 7.5 7.5 7.5 52.5  12/2 2.9 At goal, no change  7.5 7.5 7.5 7.5 7.5 7.5 7.5 52.5  11/4 3.1 Above goal, continue  7.5 7.5 7.5 7.5 7.5 7.5 7.5 52.5  10/7 2.7 At goal, no change  7.5 7.5 7.5 7.5 7.5 7.5 7.5 52.5  9/9 2.7 At goal, no change  7.5 7.5 7.5 7.5 7.5 7.5 7.5 52.5  8/17 2.8 At goal, no change  7.5 7.5 7.5 7.5 7.5 7.5 7.5 52.5  8/3 3.4 Above goal, decrease  7.5 7.5 7.5 7.5 7.5 7.5 7.5 52.5  6/8 2.9 At goal, no change  7.5 7.5 7.5 7.5 7.5 10 7.5 55  2/28 2.7 At goal, no change  7.5 7.5 7.5 7.5 7.5 10 7.5 55  1/3 2.6 At goal, no change  7.5 7.5 7.5 7.5 7.5 10 7.5 55  11/20 2.8 At goal, no change  7.5 7.5 7.5 7.5 7.5 10 7.5 55  10/23 2.6 At goal, no change  7.5 7.5 7.5 7.5 7.5 10 7.5 55  8/23 2.2 At goal, no change  7.5 7.5 7.5 7.5 7.5 10 7.5 55  7/26 2.5 At goal, no change  7.5 7.5 7.5 7.5 7.5 10 7.5 55  6/28 2.3 At goal, no change  7.5 7.5 7.5 7.5 7.5 10 7.5 55  5/31 2.6 At goal, no change  7.5 7.5 7.5 7.5 7.5 10 7.5 55  5/1 2.5 At goal, no change  7.5 7.5 7.5 7.5 7.5 10 7.5 55  4/10 2.0 At goal, no change  7.5 7.5 7.5 7.5 7.5 10 7.5 55  3/13 2.6 At goal, no change  7.5 7.5 7.5 7.5 7.5 10 7.5 55  2/27 2.7 At goal, no change  7.5 7.5 7.5 7.5 7.5 10 7.5 55  2/20 2.9 At goal, no change  7.5 7.5 7.5 7.5 7.5 10 7.5 55  2/13 2.1 At goal, no change  7.5 7.5 7.5 7.5 7.5 10 7.5 55    Last CBC:  Lab Results   Component Value Date    RBC 4.19 (L) 08/23/2023    HGB 11.3 (L) 08/23/2023    HCT 33.9 (L) 08/23/2023    MCV 80.9 08/23/2023    MCH 26.9 08/23/2023    MPV 6.7 08/23/2023    RDW 16.7 (H) 08/23/2023     08/23/2023     Patient History:  Recent hospitalizations/HC visits None reported   Recent medication changes 8/2-8/30/23: Zosyn    Medications taken regularly that may interact with warfarin or alter INR None reported   Warfarin dose taken as prescribed Yes -

## 2023-08-30 ENCOUNTER — TELEPHONE (OUTPATIENT)
Dept: INFECTIOUS DISEASES | Age: 58
End: 2023-08-30

## 2023-08-30 ENCOUNTER — OFFICE VISIT (OUTPATIENT)
Dept: INFECTIOUS DISEASES | Age: 58
End: 2023-08-30
Payer: COMMERCIAL

## 2023-08-30 VITALS
HEIGHT: 66 IN | SYSTOLIC BLOOD PRESSURE: 122 MMHG | TEMPERATURE: 97.7 F | OXYGEN SATURATION: 82 % | HEART RATE: 64 BPM | DIASTOLIC BLOOD PRESSURE: 79 MMHG | WEIGHT: 248.6 LBS | BODY MASS INDEX: 39.95 KG/M2

## 2023-08-30 DIAGNOSIS — L97.919 LEG ULCER, RIGHT, WITH UNSPECIFIED SEVERITY (HCC): ICD-10-CM

## 2023-08-30 DIAGNOSIS — M79.89 PAIN AND SWELLING OF RIGHT LOWER LEG: ICD-10-CM

## 2023-08-30 DIAGNOSIS — M79.661 PAIN AND SWELLING OF RIGHT LOWER LEG: ICD-10-CM

## 2023-08-30 DIAGNOSIS — A49.8 PSEUDOMONAS INFECTION: Primary | ICD-10-CM

## 2023-08-30 PROCEDURE — 99215 OFFICE O/P EST HI 40 MIN: CPT | Performed by: INTERNAL MEDICINE

## 2023-08-30 NOTE — TELEPHONE ENCOUNTER
PICC line pulled in clinic today    [facility] at [facility] and gave verbal order to end IV ATB and that we had pull PICC in [facility] verbalized understanding

## 2023-08-30 NOTE — PROGRESS NOTES
Infectious Diseases Follow-up Note    Reason for Consult:   R leg wound infection  Requesting Physician:   Dr La Ceja  Primary Care Physician:  Nori Alberto MD  History Obtained From:   Patient, EPIC    CHIEF COMPLAINT:      Chief Complaint   Patient presents with    Follow-up     Leg ulcer right       HISTORY OF PRESENT ILLNESS:      Hx LE DVT  Pt with chronic LE edema, L > R, chronic R LE wounds, followed at [facility] [address] 4/2018, had OR debridement 4/24/18 (Black). Hx MDR Pseudomonas aeruginosa+    8/8/23 Office Visit  Pt presented to [facility] clinic - 7/31/23  Pt noted to have increase LE edema, R>L, assoc with venous ulceration  R leg ulcer / wound with drainage, odor  Cult sent -  Pseudomonas aeruginosa (1)   Antibiotic Interpretation Microscan     cefepime Sensitive 8 mcg/mL   ciprofloxacin Resistant >2 mcg/mL   gentamicin Sensitive <=4 mcg/mL   meropenem Sensitive <=1 mcg/mL   piperacillin-tazobactam Sensitive <=16 mcg/mL   tobramycin Sensitive <=4 mcg/mL   levofloxacin Resistant >=32 ug/ml      Enterococcus faecalis (2)  Antibiotic Interpretation Microscan     ampicillin Sensitive <=2 mcg/mL   ciprofloxacin Resistant >2 mcg/mL   levofloxacin Resistant >4 mcg/mL   vancomycin Sensitive 2 mcg/mL     On 8/2 PICC placed, initiated on iv Zosyn 13.5 gm iv continuous  F/u 8/7, [facility] f/u, less pain and drainage    8/30/23 Follow-up Visit  Pt has been on iv Zosyn since last visit. He has 2 more doses to complete 4 weeks. He reports less drainage, less pain. Has had regular f/u with [facility]      #########    6/16/21 - Office consult  Pain worse starting end of April 2021. Assoc with increase in wound drainage  Rx doxycycline with some improvement in pain and drainage.   Doxy ended on 6/13    Wound cult 5/3/21 - mod Ps aeruginosa, MSSA, C striatum  Pseudomonas aeruginosa (1)  Antibiotic Interpretation JIGNESH   cefepime Sensitive 8 mcg/mL   ciprofloxacin Resistant >2 mcg/mL   gentamicin Sensitive <=4 mcg/mL   meropenem

## 2023-09-05 ENCOUNTER — HOSPITAL ENCOUNTER (OUTPATIENT)
Dept: WOUND CARE | Age: 58
Discharge: HOME | End: 2023-09-05
Payer: COMMERCIAL

## 2023-09-05 VITALS
HEART RATE: 73 BPM | SYSTOLIC BLOOD PRESSURE: 143 MMHG | DIASTOLIC BLOOD PRESSURE: 82 MMHG | RESPIRATION RATE: 16 BRPM | TEMPERATURE: 97.3 F

## 2023-09-05 DIAGNOSIS — L97.909 VENOUS ULCER (HCC): ICD-10-CM

## 2023-09-05 DIAGNOSIS — I87.311 IDIOPATHIC CHRONIC VENOUS HYPERTENSION OF RIGHT LOWER EXTREMITY WITH ULCER (HCC): ICD-10-CM

## 2023-09-05 DIAGNOSIS — I83.009 VENOUS ULCER (HCC): ICD-10-CM

## 2023-09-05 DIAGNOSIS — I89.0 LYMPHEDEMA: ICD-10-CM

## 2023-09-05 DIAGNOSIS — I89.0 CHRONIC ACQUIRED LYMPHEDEMA: ICD-10-CM

## 2023-09-05 DIAGNOSIS — L97.919 IDIOPATHIC CHRONIC VENOUS HYPERTENSION OF RIGHT LOWER EXTREMITY WITH ULCER (HCC): ICD-10-CM

## 2023-09-05 DIAGNOSIS — Z91.199 NONCOMPLIANCE: Primary | ICD-10-CM

## 2023-09-05 PROCEDURE — 11045 DBRDMT SUBQ TISS EACH ADDL: CPT

## 2023-09-05 PROCEDURE — 11042 DBRDMT SUBQ TIS 1ST 20SQCM/<: CPT | Performed by: SPECIALIST

## 2023-09-05 PROCEDURE — 11045 DBRDMT SUBQ TISS EACH ADDL: CPT | Performed by: SPECIALIST

## 2023-09-05 PROCEDURE — 11042 DBRDMT SUBQ TIS 1ST 20SQCM/<: CPT

## 2023-09-05 PROCEDURE — 29581 APPL MULTLAYER CMPRN SYS LEG: CPT

## 2023-09-05 PROCEDURE — 6370000000 HC RX 637 (ALT 250 FOR IP): Performed by: SPECIALIST

## 2023-09-05 RX ORDER — BETAMETHASONE DIPROPIONATE 0.05 %
OINTMENT (GRAM) TOPICAL ONCE
OUTPATIENT
Start: 2023-09-05 | End: 2023-09-05

## 2023-09-05 RX ORDER — CLOBETASOL PROPIONATE 0.5 MG/G
OINTMENT TOPICAL ONCE
OUTPATIENT
Start: 2023-09-05 | End: 2023-09-05

## 2023-09-05 RX ORDER — LIDOCAINE HYDROCHLORIDE 40 MG/ML
SOLUTION TOPICAL ONCE
OUTPATIENT
Start: 2023-09-05 | End: 2023-09-05

## 2023-09-05 RX ORDER — SODIUM CHLOR/HYPOCHLOROUS ACID 0.033 %
SOLUTION, IRRIGATION IRRIGATION ONCE
OUTPATIENT
Start: 2023-09-05 | End: 2023-09-05

## 2023-09-05 RX ORDER — GENTAMICIN SULFATE 1 MG/G
OINTMENT TOPICAL ONCE
OUTPATIENT
Start: 2023-09-05 | End: 2023-09-05

## 2023-09-05 RX ORDER — BACITRACIN ZINC AND POLYMYXIN B SULFATE 500; 1000 [USP'U]/G; [USP'U]/G
OINTMENT TOPICAL ONCE
OUTPATIENT
Start: 2023-09-05 | End: 2023-09-05

## 2023-09-05 RX ORDER — LIDOCAINE HYDROCHLORIDE 20 MG/ML
JELLY TOPICAL ONCE
OUTPATIENT
Start: 2023-09-05 | End: 2023-09-05

## 2023-09-05 RX ORDER — LIDOCAINE HYDROCHLORIDE 40 MG/ML
SOLUTION TOPICAL ONCE
Status: COMPLETED | OUTPATIENT
Start: 2023-09-05 | End: 2023-09-05

## 2023-09-05 RX ORDER — IBUPROFEN 200 MG
TABLET ORAL ONCE
OUTPATIENT
Start: 2023-09-05 | End: 2023-09-05

## 2023-09-05 RX ORDER — GINSENG 100 MG
CAPSULE ORAL ONCE
OUTPATIENT
Start: 2023-09-05 | End: 2023-09-05

## 2023-09-05 RX ORDER — LIDOCAINE 50 MG/G
OINTMENT TOPICAL ONCE
OUTPATIENT
Start: 2023-09-05 | End: 2023-09-05

## 2023-09-05 RX ORDER — LIDOCAINE 40 MG/G
CREAM TOPICAL ONCE
OUTPATIENT
Start: 2023-09-05 | End: 2023-09-05

## 2023-09-05 RX ADMIN — LIDOCAINE HYDROCHLORIDE: 40 SOLUTION TOPICAL at 11:01

## 2023-09-05 ASSESSMENT — PAIN DESCRIPTION - DESCRIPTORS: DESCRIPTORS: BURNING

## 2023-09-05 ASSESSMENT — PAIN DESCRIPTION - LOCATION: LOCATION: LEG

## 2023-09-05 ASSESSMENT — PAIN SCALES - GENERAL: PAINLEVEL_OUTOF10: 4

## 2023-09-05 ASSESSMENT — PAIN DESCRIPTION - FREQUENCY: FREQUENCY: CONTINUOUS

## 2023-09-05 ASSESSMENT — PAIN DESCRIPTION - PAIN TYPE: TYPE: CHRONIC PAIN

## 2023-09-05 ASSESSMENT — PAIN DESCRIPTION - ORIENTATION: ORIENTATION: RIGHT

## 2023-09-05 NOTE — DISCHARGE INSTRUCTIONS
[facility] Physician Orders and Discharge Instructions  [address] [address] [place] [facility]  Telephone: [phone]  NAME:  Jose Buckley  YOB: 1965  MEDICAL RECORD NUMBER:  [id number]  DATE: 9/5/23     Return Appointment:  Return Appointment: With Natalie Miranda MD  in  [facility])  [] Return Appointment for a Wound Assessment with the nurse on:     Future Appointments   Date Time Provider [address]   10/11/2023  1:30 PM [address] [facility],[address] Floor: none    Medically necessary services for evaluation and treatment: []Skilled Nursing (using clean technique) []PT (Eval & Treat) []OT (Eval & Treat) []Social Work []Dietician []Other:      Wound care instructions: If you smoke we ask that you refrain from smoking. Smoking inhibits wounds from healing. When taking antibiotics take the entire prescription as ordered. Do not stop taking until medication is all gone unless otherwise instructed. Exercise as tolerated. Keep weight off wounds and reposition every 2 hours if applicable. Do not get wounds wet in the bath or shower unless otherwise instructed by your physician. If your wound is on your foot or leg, you may purchase a cast bag. Please ask at the pharmacy. Wash hands with soap and water prior to and after every dressing change. [x]Wash wounds with: Vashe wash - Apply enough Vashe to soak a piece of gauze and place on wound bed for 5-10 minutes. No need to rinse after soaking. [x]Kiah wound Topical Treatments: Do not apply lotions, creams, or ointments to the skin around the wound bed unless directed as followed:   Apply around the wound: Moisturizing lotion         [x]Wound Location: right medial lower leg   Apply Primary Dressing to wound: Candice  Secondary Dressing: Extra absorbant pad   Avoid contact of tape with skin if possible.   When to change Dressing: DO NOT

## 2023-09-05 NOTE — PROGRESS NOTES
[facility]  Progress Note and Procedure Note      Darlene Rehman  MEDICAL RECORD NUMBER:  [id number]  AGE: 62 y.o. GENDER: male  : 1965  EPISODE DATE:  2023    Subjective:     Chief Complaint   Patient presents with    Wound Check     Right lower leg         HISTORY of PRESENT ILLNESS HPI     Darlene Remhan is a 62 y.o. male who presents today for wound/ulcer evaluation. History of Wound Context: Patient continues follow-up for venous insufficiency with ulceration lymphedema. He is now off IV antibiotics. He remains in a 4 layer Coban compression.   He states he is using upon occasion his lymphedema pumps  Wound/Ulcer Pain Timing/Severity: mild  Quality of pain: sharp  Severity:  2 / 10   Modifying Factors: None  Associated Signs/Symptoms: edema and drainage    Ulcer Identification:  Ulcer Type: venous    Contributing Factors: edema, venous stasis, lymphedema, and obesity    Acute Wound: N/A    PAST MEDICAL HISTORY        Diagnosis Date    DVT (deep venous thrombosis) (HCC)     Hx of blood clots     Pain and swelling of right lower leg        PAST SURGICAL HISTORY    Past Surgical History:   Procedure Laterality Date    BALLOON ANGIOPLASTY, ARTERY Right 2017    at [address] [place] [address] Sw, ESOPHAGUS      SKIN SPLIT GRAFT Right        FAMILY HISTORY    Family History   Problem Relation Age of Onset    Diabetes Mother     Heart Attack Father        SOCIAL HISTORY    Social History     Tobacco Use    Smoking status: Never    Smokeless tobacco: Never   Vaping Use    Vaping Use: Never used   Substance Use Topics    Alcohol use: No    Drug use: No       ALLERGIES    No Known Allergies    MEDICATIONS    Current Outpatient Medications on File Prior to Encounter   Medication Sig Dispense Refill    ibuprofen (ADVIL;MOTRIN) 200 MG tablet Take 1 tablet by mouth every 6 hours as needed for Pain      warfarin (COUMADIN) 5 MG tablet TAKE 1 or 1 AND 1/2 TABLET BY MOUTH DAILY AS

## 2023-09-05 NOTE — PROGRESS NOTES
Multilayer Compression Wrap   (Not Unna) Below the Knee    NAME:  Horacio Drake  YOB: 1965  MEDICAL RECORD NUMBER:  [id number]  DATE:  9/5/2023       Removed old Multilayer wrap if present and washed leg with mild soap/water. Applied moisturizing agent to dry skin as needed. Applied primary and secondary dressing as ordered    Applied multilayered dressing below the knee to Right lower leg(s)  (4 Layer Compression Wrap ) . Instructed patient/caregiver not to remove dressing and to keep it clean and dry. Instructed patient/caregiver on complications to report to provider, such as pain, numbness in toes, heavy drainage, and slippage of dressing. Instructed patient on purpose of compression dressing and on activity and exercise recommendations.    Applied per   Guidelines    Electronically signed by Olga Rachel RN on 9/5/2023 at 11:45 AM

## 2023-09-11 ENCOUNTER — HOSPITAL ENCOUNTER (OUTPATIENT)
Dept: WOUND CARE | Age: 58
Discharge: HOME | End: 2023-09-11
Payer: COMMERCIAL

## 2023-09-11 VITALS
HEART RATE: 73 BPM | RESPIRATION RATE: 16 BRPM | TEMPERATURE: 97.5 F | DIASTOLIC BLOOD PRESSURE: 83 MMHG | SYSTOLIC BLOOD PRESSURE: 127 MMHG

## 2023-09-11 DIAGNOSIS — I89.0 LYMPHEDEMA: Primary | ICD-10-CM

## 2023-09-11 DIAGNOSIS — Z91.199 NONCOMPLIANCE: ICD-10-CM

## 2023-09-11 DIAGNOSIS — I89.0 CHRONIC ACQUIRED LYMPHEDEMA: ICD-10-CM

## 2023-09-11 DIAGNOSIS — L97.909 VENOUS ULCER (HCC): ICD-10-CM

## 2023-09-11 DIAGNOSIS — I87.311 IDIOPATHIC CHRONIC VENOUS HYPERTENSION OF RIGHT LOWER EXTREMITY WITH ULCER (HCC): ICD-10-CM

## 2023-09-11 DIAGNOSIS — L97.919 IDIOPATHIC CHRONIC VENOUS HYPERTENSION OF RIGHT LOWER EXTREMITY WITH ULCER (HCC): ICD-10-CM

## 2023-09-11 DIAGNOSIS — I83.009 VENOUS ULCER (HCC): ICD-10-CM

## 2023-09-11 PROCEDURE — 6370000000 HC RX 637 (ALT 250 FOR IP): Performed by: SPECIALIST

## 2023-09-11 PROCEDURE — 11045 DBRDMT SUBQ TISS EACH ADDL: CPT

## 2023-09-11 PROCEDURE — 29581 APPL MULTLAYER CMPRN SYS LEG: CPT

## 2023-09-11 PROCEDURE — 11042 DBRDMT SUBQ TIS 1ST 20SQCM/<: CPT

## 2023-09-11 PROCEDURE — 11045 DBRDMT SUBQ TISS EACH ADDL: CPT | Performed by: SPECIALIST

## 2023-09-11 PROCEDURE — 11042 DBRDMT SUBQ TIS 1ST 20SQCM/<: CPT | Performed by: SPECIALIST

## 2023-09-11 RX ORDER — SODIUM CHLOR/HYPOCHLOROUS ACID 0.033 %
SOLUTION, IRRIGATION IRRIGATION ONCE
OUTPATIENT
Start: 2023-09-11 | End: 2023-09-11

## 2023-09-11 RX ORDER — LIDOCAINE HYDROCHLORIDE 40 MG/ML
SOLUTION TOPICAL ONCE
OUTPATIENT
Start: 2023-09-11 | End: 2023-09-11

## 2023-09-11 RX ORDER — LIDOCAINE 40 MG/G
CREAM TOPICAL ONCE
OUTPATIENT
Start: 2023-09-11 | End: 2023-09-11

## 2023-09-11 RX ORDER — BACITRACIN ZINC AND POLYMYXIN B SULFATE 500; 1000 [USP'U]/G; [USP'U]/G
OINTMENT TOPICAL ONCE
OUTPATIENT
Start: 2023-09-11 | End: 2023-09-11

## 2023-09-11 RX ORDER — CLOBETASOL PROPIONATE 0.5 MG/G
OINTMENT TOPICAL ONCE
OUTPATIENT
Start: 2023-09-11 | End: 2023-09-11

## 2023-09-11 RX ORDER — LIDOCAINE HYDROCHLORIDE 20 MG/ML
JELLY TOPICAL ONCE
OUTPATIENT
Start: 2023-09-11 | End: 2023-09-11

## 2023-09-11 RX ORDER — BETAMETHASONE DIPROPIONATE 0.05 %
OINTMENT (GRAM) TOPICAL ONCE
OUTPATIENT
Start: 2023-09-11 | End: 2023-09-11

## 2023-09-11 RX ORDER — LIDOCAINE 50 MG/G
OINTMENT TOPICAL ONCE
OUTPATIENT
Start: 2023-09-11 | End: 2023-09-11

## 2023-09-11 RX ORDER — GINSENG 100 MG
CAPSULE ORAL ONCE
OUTPATIENT
Start: 2023-09-11 | End: 2023-09-11

## 2023-09-11 RX ORDER — IBUPROFEN 200 MG
TABLET ORAL ONCE
OUTPATIENT
Start: 2023-09-11 | End: 2023-09-11

## 2023-09-11 RX ORDER — GENTAMICIN SULFATE 1 MG/G
OINTMENT TOPICAL ONCE
OUTPATIENT
Start: 2023-09-11 | End: 2023-09-11

## 2023-09-11 RX ORDER — LIDOCAINE HYDROCHLORIDE 40 MG/ML
SOLUTION TOPICAL ONCE
Status: COMPLETED | OUTPATIENT
Start: 2023-09-11 | End: 2023-09-11

## 2023-09-11 RX ADMIN — LIDOCAINE HYDROCHLORIDE: 40 SOLUTION TOPICAL at 10:34

## 2023-09-11 ASSESSMENT — PAIN DESCRIPTION - PAIN TYPE: TYPE: CHRONIC PAIN

## 2023-09-11 ASSESSMENT — PAIN DESCRIPTION - FREQUENCY: FREQUENCY: CONTINUOUS

## 2023-09-11 ASSESSMENT — PAIN DESCRIPTION - DESCRIPTORS: DESCRIPTORS: BURNING

## 2023-09-11 ASSESSMENT — PAIN SCALES - GENERAL: PAINLEVEL_OUTOF10: 3

## 2023-09-11 ASSESSMENT — PAIN DESCRIPTION - ORIENTATION: ORIENTATION: RIGHT

## 2023-09-11 NOTE — DISCHARGE INSTRUCTIONS
CHANGE    [x]Wound Location: right lower posterior leg   Apply Primary Dressing to wound: Foam with silver (I.e. Polymem Ag)  Secondary Dressing: 4X4 gauze pad   Avoid contact of tape with skin if possible. When to change Dressing: DO NOT CHANGE        [x] Multilayer Compression Wrap:  Type: Applied on Right lower leg(s)  4 Layer Compression Wrap    Do not get leg(s) with wrap wet. If wraps become too tight call the center or completely remove the wrap. Elevate leg(s) above the level of the heart when sitting. Avoid prolonged standing in one place. Applied in Clinic on 9/11/2023  The Goal of this therapy is to reduce edema and get into long term compression garments to control venous insufficiency, lymphedema and reduce occurrence of venous ulcers    [x] Edema Control:  Apply: Compression Stocking on the Left leg  Apply every morning immediately when getting up. Remove every night before going to bed unless instructed otherwise     Elevate leg(s) above the level of the heart for 30 minutes 4-5 times a day and/or when sitting. Avoid prolonged standing in one place. [x] Lymphedema Therapy:  Wear Lymphedema pumps twice a day at settings prescribed by your physician. Avoid prolonged standing in one place. Dietary:  Important dietary reminders:  1. Increase Protein intake (i.e. Lean meats, fish, eggs, legumes, and yogurt)  2. No added salt  3. If diabetic, follow a diabetic diet and check glucose prior to meals or as instructed by your physician.     Dietary Supplements(Take twice a day unless instructed otherwise):  [] Emilee Bakes  [] 30ml ProStat [] Ensure Complete [] Ensure Max/Premier [] Expedite [] Other:    Your nurse  is:  Bhupinder Mcmahon     Electronically signed by Nany Bourne RN on 9/11/2023 at 11:00 [address] [facility] [address]: Should you experience any significant changes in your wound(s) or have questions about your wound care, please contact the [facility] at

## 2023-09-11 NOTE — PROGRESS NOTES
Insurance Verification Request      Ordering Center: [facility]  [address] [facility] [address] [facility] Phone Number: [phone]  Fax Number: [phone]    Facility NPI: [id number]  Facility Tax ID [id number]    Provider Information:      Provider's Name: Dr. Sharonda Campbell   Provider's NPI: Lucita Araujo MD,  NPI: [id number]     Patient Information:     [facility]  [address]   [phone]   : 1965  AGE: 62 y.o. GENDER: male   TODAYS DATE:  2023    Insurance:     PRIMARY INSURANCE:  Plan: Pippa Trorez AYLA  Coverage: [id number]  Effective Date: 2023  Group Number: [unfilled]  Subscriber Number: [id number] - (Commercial)    Payer/Plan Subscr  Sex Relation Sub.  Ins. ID Effective Group Num   1. EVERARDOE - BUC* CORINNE HUERTA 1965 Male Self [id number]                                    [address]       Application/product Information:     Benefit Verification Request:    Reason for Request: New Wound    [employer] (Theraskin) FAX# [phone]    Trunk/Arms/Legs [id number] (1st 25 sq cm)    Theraskin    Has patient been treated with any other Skin Substitute on this Wound: yes    Yes Name of the product or produces theraskin, Number of previous applications 3, back in 2022, Wound size 8 sq/cm, Number of wounds 2,  Location of wound right medial lower leg, Duration of wound 1 year, Estimated number of applications 4     If yes, How many previous applications: 3    WOUND #: 1    Total Square CM: 31    Procedure setting: Hospital Outpatient Department    Is the Patient currently in a skilled nursing facility: No     Is the wound work related: No    Procedure date: 23    Patient Information:     Additional Dx Codes: L97.919, I87.311    Problem List Items Addressed This Visit          Circulatory    Idiopathic chronic venous hypertension of right lower extremity with ulcer ([address])    Relevant Orders    Initiate
Multilayer Compression Wrap   (Not Unna) Below the Knee    NAME:  Daphne Amaral  YOB: 1965  MEDICAL RECORD NUMBER:  [id number]  DATE:  9/11/2023       Removed old Multilayer wrap if present and washed leg with mild soap/water. Applied moisturizing agent to dry skin as needed. Applied primary and secondary dressing as ordered    Applied multilayered dressing below the knee to Right lower leg(s)  (4 Layer Compression Wrap ) . Instructed patient/caregiver not to remove dressing and to keep it clean and dry. Instructed patient/caregiver on complications to report to provider, such as pain, numbness in toes, heavy drainage, and slippage of dressing. Instructed patient on purpose of compression dressing and on activity and exercise recommendations.    Applied per   Guidelines    Electronically signed by Kannan Arriaza RN on 9/11/2023 at 11:10 AM
tape with skin if possible. When to change Dressing: DO NOT CHANGE        [x] Multilayer Compression Wrap:  Type: Applied on Right lower leg(s)  4 Layer Compression Wrap    Do not get leg(s) with wrap wet. If wraps become too tight call the center or completely remove the wrap. Elevate leg(s) above the level of the heart when sitting. Avoid prolonged standing in one place. Applied in Clinic on 9/11/2023  The Goal of this therapy is to reduce edema and get into long term compression garments to control venous insufficiency, lymphedema and reduce occurrence of venous ulcers    [x] Edema Control:  Apply: Compression Stocking on the Left leg  Apply every morning immediately when getting up. Remove every night before going to bed unless instructed otherwise     Elevate leg(s) above the level of the heart for 30 minutes 4-5 times a day and/or when sitting. Avoid prolonged standing in one place. [x] Lymphedema Therapy:  Wear Lymphedema pumps twice a day at settings prescribed by your physician. Avoid prolonged standing in one place. Dietary:  Important dietary reminders:  1. Increase Protein intake (i.e. Lean meats, fish, eggs, legumes, and yogurt)  2. No added salt  3. If diabetic, follow a diabetic diet and check glucose prior to meals or as instructed by your physician. Dietary Supplements(Take twice a day unless instructed otherwise):  [] Maylin   [] 30ml ProStat [] Ensure Complete [] Ensure Max/Premier [] Expedite [] Other:    Your nurse  is:  Bryan Hester     Electronically signed by Jessica Porter RN on 9/11/2023 at 11:00 [address] [facility] : Should you experience any significant changes in your wound(s) or have questions about your wound care, please contact the [facility] at [phone]. Hours of operation:  Mon:  8AM - 2PM  Tue: 11AM - 5PM  Wed: CLOSED  Thur: 8AM - 4:30PM  Fri:  8AM - 4:30PM  The office is closed on all major holidays.     Please

## 2023-09-15 ENCOUNTER — HOSPITAL ENCOUNTER (OUTPATIENT)
Dept: WOUND CARE | Age: 58
Discharge: HOME | End: 2023-09-15
Payer: COMMERCIAL

## 2023-09-15 VITALS
DIASTOLIC BLOOD PRESSURE: 79 MMHG | SYSTOLIC BLOOD PRESSURE: 136 MMHG | TEMPERATURE: 95.8 F | RESPIRATION RATE: 16 BRPM | HEART RATE: 84 BPM

## 2023-09-15 PROCEDURE — 29581 APPL MULTLAYER CMPRN SYS LEG: CPT

## 2023-09-15 ASSESSMENT — PAIN DESCRIPTION - FREQUENCY: FREQUENCY: CONTINUOUS

## 2023-09-15 ASSESSMENT — PAIN DESCRIPTION - LOCATION: LOCATION: LEG

## 2023-09-15 ASSESSMENT — PAIN DESCRIPTION - DESCRIPTORS: DESCRIPTORS: BURNING

## 2023-09-15 ASSESSMENT — PAIN DESCRIPTION - PAIN TYPE: TYPE: CHRONIC PAIN

## 2023-09-15 ASSESSMENT — PAIN DESCRIPTION - ORIENTATION: ORIENTATION: RIGHT

## 2023-09-15 NOTE — DISCHARGE INSTRUCTIONS
[facility] Physician Orders and Discharge Instructions  The [address] [place] [address] [address], [address] [place] [address],[address]  Telephone: [phone]    NAME:  Raymundo Major:  1965  MEDICAL RECORD NUMBER:  [id number]  DATE:  9/15/2023      Wound care:  Continue to follow the instructions and recommendations from your last doctor visit. The dressing(s) applied is the same as your last visit. Please refer to your last discharge instruction for the information on your wound care. If there were any changes made, please follow the instructions as written here: none    Future Appointments     Future Appointments   Date Time Provider [facility] [address]   9/21/2023  8:30 AM Cate Kruse  [facility]   10/11/2023  1:30 PM [address] [place] [address] MEDICATION MANAGEMENT [address] [place] [address]           Your nurse  is:  Monty Clinton     Electronically signed by Joya Ross RN on 9/15/2023 at 1:07 PM     [address] [place] [address] Information: Should you experience any significant changes in your wound(s) or have questions about your wound care, please contact the [facility] at [phone]. Our hours vary so please leave a message. Please give us 24-48 hours to return your call. If you need help with your wounds and cannot wait until we are available, contact your PCP or go to the hospital emergency room. Physician orders by:  Dr. Regis Bello        The information contained in the After Visit Summary has been reviewed with me, the patient and/or responsible adult, by my health care provider(s). I had the opportunity to ask questions regarding this information. I have elected to receive;      [] Patient unable to sign Discharge Instructions.  Given to ECF/Transportation/POA

## 2023-09-15 NOTE — PROGRESS NOTES
Abigail Layne Compression Wrap   (Not Unna) Below the Knee    NAME:  Gema Johns  YOB: 1965  MEDICAL RECORD NUMBER:  [id number]  DATE:  9/15/2023       Removed old Multilayer wrap if present and washed leg with mild soap/water. Applied moisturizing agent to dry skin as needed. Applied primary and secondary dressing as ordered    Applied multilayered dressing below the knee to Right lower leg(s)  (4 Layer Compression Wrap ) . Instructed patient/caregiver not to remove dressing and to keep it clean and dry. Instructed patient/caregiver on complications to report to provider, such as pain, numbness in toes, heavy drainage, and slippage of dressing. Instructed patient on purpose of compression dressing and on activity and exercise recommendations.    Applied per  [employer] Guidelines    Electronically signed by Beverly Ceja RN on 9/15/2023 at 1:05 PM

## 2023-09-18 DIAGNOSIS — Z79.01 CHRONIC ANTICOAGULATION: ICD-10-CM

## 2023-09-18 DIAGNOSIS — I82.423 THROMBOSIS OF BOTH ILIAC VEINS (HCC): ICD-10-CM

## 2023-09-18 DIAGNOSIS — I82.220 THROMBOSIS OF INFERIOR VENA CAVA (HCC): ICD-10-CM

## 2023-09-18 DIAGNOSIS — I82.5Z1 CHRONIC VENOUS EMBOLISM AND THROMBOSIS OF DEEP VESSELS OF DISTAL END OF RIGHT LOWER EXTREMITY (HCC): ICD-10-CM

## 2023-09-18 RX ORDER — WARFARIN SODIUM 5 MG/1
TABLET ORAL
Qty: 44 TABLET | Refills: 3 | Status: SHIPPED | OUTPATIENT
Start: 2023-09-18

## 2023-09-21 ENCOUNTER — HOSPITAL ENCOUNTER (OUTPATIENT)
Dept: WOUND CARE | Age: 58
Discharge: HOME | End: 2023-09-21
Payer: COMMERCIAL

## 2023-09-21 VITALS
HEART RATE: 71 BPM | SYSTOLIC BLOOD PRESSURE: 135 MMHG | TEMPERATURE: 96 F | DIASTOLIC BLOOD PRESSURE: 85 MMHG | RESPIRATION RATE: 16 BRPM

## 2023-09-21 DIAGNOSIS — I89.0 LYMPHEDEMA: Primary | ICD-10-CM

## 2023-09-21 DIAGNOSIS — I83.009 VENOUS ULCER (HCC): ICD-10-CM

## 2023-09-21 DIAGNOSIS — I89.0 CHRONIC ACQUIRED LYMPHEDEMA: ICD-10-CM

## 2023-09-21 DIAGNOSIS — L97.919 IDIOPATHIC CHRONIC VENOUS HYPERTENSION OF RIGHT LOWER EXTREMITY WITH ULCER (HCC): ICD-10-CM

## 2023-09-21 DIAGNOSIS — L97.909 VENOUS ULCER (HCC): ICD-10-CM

## 2023-09-21 DIAGNOSIS — I87.311 IDIOPATHIC CHRONIC VENOUS HYPERTENSION OF RIGHT LOWER EXTREMITY WITH ULCER (HCC): ICD-10-CM

## 2023-09-21 DIAGNOSIS — Z91.199 NONCOMPLIANCE: ICD-10-CM

## 2023-09-21 PROCEDURE — 11045 DBRDMT SUBQ TISS EACH ADDL: CPT

## 2023-09-21 PROCEDURE — 6370000000 HC RX 637 (ALT 250 FOR IP): Performed by: SPECIALIST

## 2023-09-21 PROCEDURE — 29581 APPL MULTLAYER CMPRN SYS LEG: CPT

## 2023-09-21 PROCEDURE — 11042 DBRDMT SUBQ TIS 1ST 20SQCM/<: CPT

## 2023-09-21 RX ORDER — CLOBETASOL PROPIONATE 0.5 MG/G
OINTMENT TOPICAL ONCE
OUTPATIENT
Start: 2023-09-21 | End: 2023-09-21

## 2023-09-21 RX ORDER — BACITRACIN ZINC AND POLYMYXIN B SULFATE 500; 1000 [USP'U]/G; [USP'U]/G
OINTMENT TOPICAL ONCE
OUTPATIENT
Start: 2023-09-21 | End: 2023-09-21

## 2023-09-21 RX ORDER — GENTAMICIN SULFATE 1 MG/G
OINTMENT TOPICAL ONCE
OUTPATIENT
Start: 2023-09-21 | End: 2023-09-21

## 2023-09-21 RX ORDER — LIDOCAINE HYDROCHLORIDE 40 MG/ML
SOLUTION TOPICAL ONCE
OUTPATIENT
Start: 2023-09-21 | End: 2023-09-21

## 2023-09-21 RX ORDER — SODIUM CHLOR/HYPOCHLOROUS ACID 0.033 %
SOLUTION, IRRIGATION IRRIGATION ONCE
OUTPATIENT
Start: 2023-09-21 | End: 2023-09-21

## 2023-09-21 RX ORDER — IBUPROFEN 200 MG
TABLET ORAL ONCE
OUTPATIENT
Start: 2023-09-21 | End: 2023-09-21

## 2023-09-21 RX ORDER — LIDOCAINE HYDROCHLORIDE 40 MG/ML
SOLUTION TOPICAL ONCE
Status: COMPLETED | OUTPATIENT
Start: 2023-09-21 | End: 2023-09-21

## 2023-09-21 RX ORDER — LIDOCAINE HYDROCHLORIDE 20 MG/ML
JELLY TOPICAL ONCE
OUTPATIENT
Start: 2023-09-21 | End: 2023-09-21

## 2023-09-21 RX ORDER — TRIAMCINOLONE ACETONIDE 1 MG/G
OINTMENT TOPICAL ONCE
OUTPATIENT
Start: 2023-09-21 | End: 2023-09-21

## 2023-09-21 RX ORDER — GINSENG 100 MG
CAPSULE ORAL ONCE
OUTPATIENT
Start: 2023-09-21 | End: 2023-09-21

## 2023-09-21 RX ORDER — LIDOCAINE 50 MG/G
OINTMENT TOPICAL ONCE
OUTPATIENT
Start: 2023-09-21 | End: 2023-09-21

## 2023-09-21 RX ORDER — BETAMETHASONE DIPROPIONATE 0.05 %
OINTMENT (GRAM) TOPICAL ONCE
OUTPATIENT
Start: 2023-09-21 | End: 2023-09-21

## 2023-09-21 RX ORDER — LIDOCAINE 40 MG/G
CREAM TOPICAL ONCE
OUTPATIENT
Start: 2023-09-21 | End: 2023-09-21

## 2023-09-21 RX ADMIN — LIDOCAINE HYDROCHLORIDE: 40 SOLUTION TOPICAL at 09:09

## 2023-09-21 ASSESSMENT — PAIN DESCRIPTION - PAIN TYPE: TYPE: CHRONIC PAIN

## 2023-09-21 ASSESSMENT — PAIN DESCRIPTION - DESCRIPTORS: DESCRIPTORS: BURNING

## 2023-09-21 ASSESSMENT — PAIN DESCRIPTION - LOCATION: LOCATION: LEG

## 2023-09-21 ASSESSMENT — PAIN SCALES - GENERAL: PAINLEVEL_OUTOF10: 4

## 2023-09-21 ASSESSMENT — PAIN DESCRIPTION - FREQUENCY: FREQUENCY: CONTINUOUS

## 2023-09-21 ASSESSMENT — PAIN DESCRIPTION - ORIENTATION: ORIENTATION: RIGHT

## 2023-09-21 NOTE — PATIENT INSTRUCTIONS
Dressing: DO NOT CHANGE      [x] Multilayer Compression Wrap:  Type: Applied on Right lower leg(s)  4 Layer Compression Wrap    Do not get leg(s) with wrap wet. If wraps become too tight call the center or completely remove the wrap. Elevate leg(s) above the level of the heart when sitting. Avoid prolonged standing in one place. Applied in Clinic on 9/21/2023  The Goal of this therapy is to reduce edema and get into long term compression garments to control venous insufficiency, lymphedema and reduce occurrence of venous ulcers    [x] Edema Control: 30-40mmHG stocking  Apply: Compression Stocking on the Left leg  Apply every morning immediately when getting up. Remove every night before going to bed unless instructed otherwise     Elevate leg(s) above the level of the heart for 30 minutes 4-5 times a day and/or when sitting. Avoid prolonged standing in one place. [x] Lymphedema Therapy:  Wear Lymphedema pumps twice a day at settings prescribed by your physician. Avoid prolonged standing in one place. Dietary:  Important dietary reminders:  1. Increase Protein intake (i.e. Lean meats, fish, eggs, legumes, and yogurt)  2. No added salt  3. If diabetic, follow a diabetic diet and check glucose prior to meals or as instructed by your physician. Dietary Supplements(Take twice a day unless instructed otherwise):  [] Lajoyce Aquas  [] 30ml ProStat [] Ensure Complete [] Ensure Max/Premier [] Expedite [] Other:    Your nurse  is:  Denisha Estevez     Electronically signed by Génesis Gilmore RN on 9/21/2023 at 9:26 AM     [address] [place] [address] Information: Should you experience any significant changes in your wound(s) or have questions about your wound care, please contact the [facility] at [phone]. Hours of operation:  Mon:  8AM - 2PM  Tue: 11AM - 5PM  Wed: CLOSED  Thur: 8AM - 4:30PM  Fri:  8AM - 4:30PM  The office is closed on all major holidays.     Please give us 24-48 business hours

## 2023-09-25 ENCOUNTER — HOSPITAL ENCOUNTER (OUTPATIENT)
Dept: WOUND CARE | Age: 58
Discharge: HOME | End: 2023-09-25
Payer: COMMERCIAL

## 2023-09-25 VITALS
SYSTOLIC BLOOD PRESSURE: 149 MMHG | RESPIRATION RATE: 16 BRPM | HEART RATE: 96 BPM | DIASTOLIC BLOOD PRESSURE: 71 MMHG | TEMPERATURE: 96.9 F

## 2023-09-25 PROCEDURE — 29581 APPL MULTLAYER CMPRN SYS LEG: CPT

## 2023-09-25 NOTE — DISCHARGE INSTRUCTIONS
411 [facility] Physician Orders and Discharge Instructions  The [address] [place] [address] [address] [place] [address] [address], [address] [place] [address] East,4Th Floor  Telephone: [phone]    NAME:  Neto Calzada:  1965  MEDICAL RECORD NUMBER:  [id number]  DATE:  9/25/2023      Wound care:  Continue to follow the instructions and recommendations from your last doctor visit. The dressing(s) applied is the same as your last visit. Please refer to your last discharge instruction for the information on your wound care. If there were any changes made, please follow the instructions as written here:     Future Appointments     Future Appointments   Date Time Provider [address]   9/28/2023  8:30 AM Yadiel Tavares  [facility]   10/11/2023  1:30 PM [address] [place] [address] MEDICATION MANAGEMENT [address] [place] [address]           Your nurse  is:  George Smith     Electronically signed by Chrissy Monroe RN on 9/25/2023 at 9:00 AM     [facility] Main: Should you experience any significant changes in your wound(s) or have questions about your wound care, please contact the [facility] at [phone]. Our hours vary so please leave a message. Please give us 24-48 hours to return your call. If you need help with your wounds and cannot wait until we are available, contact your PCP or go to the hospital emergency room. Physician orders by:  Dr. Nilsa Carlson        The information contained in the After Visit Summary has been reviewed with me, the patient and/or responsible adult, by my health care provider(s). I had the opportunity to ask questions regarding this information. I have elected to receive;      [] Patient unable to sign Discharge Instructions.  Given to ECF/Transportation/POA

## 2023-09-25 NOTE — PROGRESS NOTES
Multilayer Compression Wrap   (Not Unna) Below the Knee    NAME:  Sanna Collins  YOB: 1965  MEDICAL RECORD NUMBER:  [id number]  DATE:  9/25/2023       Removed old Multilayer wrap if present and washed leg with mild soap/water. Applied moisturizing agent to dry skin as needed. Applied primary and secondary dressing as ordered    Applied multilayered dressing below the knee to Right lower leg(s)  (4 Layer Compression Wrap ) . Instructed patient/caregiver not to remove dressing and to keep it clean and dry. Instructed patient/caregiver on complications to report to provider, such as pain, numbness in toes, heavy drainage, and slippage of dressing. Instructed patient on purpose of compression dressing and on activity and exercise recommendations.    Applied per   Guidelines    Electronically signed by Meera Mosher RN on 9/25/2023 at 10:19 AM

## 2023-09-28 ENCOUNTER — HOSPITAL ENCOUNTER (OUTPATIENT)
Dept: WOUND CARE | Age: 58
Discharge: HOME | End: 2023-09-28
Payer: COMMERCIAL

## 2023-09-28 VITALS
TEMPERATURE: 97.1 F | HEART RATE: 67 BPM | DIASTOLIC BLOOD PRESSURE: 82 MMHG | RESPIRATION RATE: 16 BRPM | SYSTOLIC BLOOD PRESSURE: 142 MMHG

## 2023-09-28 DIAGNOSIS — L97.919 IDIOPATHIC CHRONIC VENOUS HYPERTENSION OF RIGHT LOWER EXTREMITY WITH ULCER (HCC): ICD-10-CM

## 2023-09-28 DIAGNOSIS — Z91.199 NONCOMPLIANCE: ICD-10-CM

## 2023-09-28 DIAGNOSIS — L97.909 VENOUS ULCER (HCC): ICD-10-CM

## 2023-09-28 DIAGNOSIS — I89.0 CHRONIC ACQUIRED LYMPHEDEMA: ICD-10-CM

## 2023-09-28 DIAGNOSIS — I89.0 LYMPHEDEMA: Primary | ICD-10-CM

## 2023-09-28 DIAGNOSIS — I83.009 VENOUS ULCER (HCC): ICD-10-CM

## 2023-09-28 DIAGNOSIS — I87.311 IDIOPATHIC CHRONIC VENOUS HYPERTENSION OF RIGHT LOWER EXTREMITY WITH ULCER (HCC): ICD-10-CM

## 2023-09-28 PROCEDURE — 6370000000 HC RX 637 (ALT 250 FOR IP): Performed by: SPECIALIST

## 2023-09-28 PROCEDURE — 29581 APPL MULTLAYER CMPRN SYS LEG: CPT

## 2023-09-28 PROCEDURE — 11042 DBRDMT SUBQ TIS 1ST 20SQCM/<: CPT

## 2023-09-28 PROCEDURE — 11042 DBRDMT SUBQ TIS 1ST 20SQCM/<: CPT | Performed by: SPECIALIST

## 2023-09-28 PROCEDURE — 11045 DBRDMT SUBQ TISS EACH ADDL: CPT | Performed by: SPECIALIST

## 2023-09-28 PROCEDURE — 11045 DBRDMT SUBQ TISS EACH ADDL: CPT

## 2023-09-28 RX ORDER — LIDOCAINE HYDROCHLORIDE 20 MG/ML
JELLY TOPICAL ONCE
OUTPATIENT
Start: 2023-09-28 | End: 2023-09-28

## 2023-09-28 RX ORDER — SODIUM CHLOR/HYPOCHLOROUS ACID 0.033 %
SOLUTION, IRRIGATION IRRIGATION ONCE
Status: COMPLETED | OUTPATIENT
Start: 2023-09-28 | End: 2023-09-28

## 2023-09-28 RX ORDER — BACITRACIN ZINC AND POLYMYXIN B SULFATE 500; 1000 [USP'U]/G; [USP'U]/G
OINTMENT TOPICAL ONCE
OUTPATIENT
Start: 2023-09-28 | End: 2023-09-28

## 2023-09-28 RX ORDER — TRIAMCINOLONE ACETONIDE 1 MG/G
OINTMENT TOPICAL ONCE
OUTPATIENT
Start: 2023-09-28 | End: 2023-09-28

## 2023-09-28 RX ORDER — GENTAMICIN SULFATE 1 MG/G
OINTMENT TOPICAL ONCE
OUTPATIENT
Start: 2023-09-28 | End: 2023-09-28

## 2023-09-28 RX ORDER — GINSENG 100 MG
CAPSULE ORAL ONCE
OUTPATIENT
Start: 2023-09-28 | End: 2023-09-28

## 2023-09-28 RX ORDER — CLOBETASOL PROPIONATE 0.5 MG/G
OINTMENT TOPICAL ONCE
OUTPATIENT
Start: 2023-09-28 | End: 2023-09-28

## 2023-09-28 RX ORDER — LIDOCAINE HYDROCHLORIDE 40 MG/ML
SOLUTION TOPICAL ONCE
Status: COMPLETED | OUTPATIENT
Start: 2023-09-28 | End: 2023-09-28

## 2023-09-28 RX ORDER — LIDOCAINE 40 MG/G
CREAM TOPICAL ONCE
OUTPATIENT
Start: 2023-09-28 | End: 2023-09-28

## 2023-09-28 RX ORDER — SODIUM CHLOR/HYPOCHLOROUS ACID 0.033 %
SOLUTION, IRRIGATION IRRIGATION ONCE
OUTPATIENT
Start: 2023-09-28 | End: 2023-09-28

## 2023-09-28 RX ORDER — LIDOCAINE 50 MG/G
OINTMENT TOPICAL ONCE
OUTPATIENT
Start: 2023-09-28 | End: 2023-09-28

## 2023-09-28 RX ORDER — BETAMETHASONE DIPROPIONATE 0.05 %
OINTMENT (GRAM) TOPICAL ONCE
OUTPATIENT
Start: 2023-09-28 | End: 2023-09-28

## 2023-09-28 RX ORDER — LIDOCAINE HYDROCHLORIDE 40 MG/ML
SOLUTION TOPICAL ONCE
OUTPATIENT
Start: 2023-09-28 | End: 2023-09-28

## 2023-09-28 RX ORDER — IBUPROFEN 200 MG
TABLET ORAL ONCE
OUTPATIENT
Start: 2023-09-28 | End: 2023-09-28

## 2023-09-28 RX ADMIN — Medication: at 09:17

## 2023-09-28 RX ADMIN — LIDOCAINE HYDROCHLORIDE: 40 SOLUTION TOPICAL at 08:41

## 2023-09-28 ASSESSMENT — PAIN DESCRIPTION - DESCRIPTORS: DESCRIPTORS: BURNING

## 2023-09-28 ASSESSMENT — PAIN DESCRIPTION - FREQUENCY: FREQUENCY: CONTINUOUS

## 2023-09-28 ASSESSMENT — PAIN DESCRIPTION - LOCATION: LOCATION: LEG

## 2023-09-28 ASSESSMENT — PAIN DESCRIPTION - PAIN TYPE: TYPE: CHRONIC PAIN

## 2023-09-28 ASSESSMENT — PAIN SCALES - GENERAL: PAINLEVEL_OUTOF10: 4

## 2023-09-28 ASSESSMENT — PAIN DESCRIPTION - ORIENTATION: ORIENTATION: RIGHT

## 2023-09-28 NOTE — PATIENT INSTRUCTIONS
411 [facility] Physician Orders and Discharge Instructions  [address] [place] [address]. [facility]  Telephone: [phone]  NAME:  Helmut Gilford  YOB: 1965  MEDICAL RECORD NUMBER:  [id number]  DATE: 9/28/23     Return Appointment:  Return Appointment: With Vanessa Menchaca MD  in  1 [facility])  [] Return Appointment for a Wound Assessment with the nurse on:     Future Appointments   Date Time Provider 4600  [facility]   10/11/2023  1:30 PM 2400 [facility],2Nd Floor: none    Medically necessary services for evaluation and treatment: []Skilled Nursing (using clean technique) []PT (Eval & Treat) []OT (Eval & Treat) []Social Work []Dietician []Other:      Wound care instructions: If you smoke we ask that you refrain from smoking. Smoking inhibits wounds from healing. When taking antibiotics take the entire prescription as ordered. Do not stop taking until medication is all gone unless otherwise instructed. Exercise as tolerated. Keep weight off wounds and reposition every 2 hours if applicable. Do not get wounds wet in the bath or shower unless otherwise instructed by your physician. If your wound is on your foot or leg, you may purchase a cast bag. Please ask at the pharmacy. Wash hands with soap and water prior to and after every dressing change. [x]Wash wounds with: Vashe wash - Apply enough Vashe to soak a piece of gauze and place on wound bed for 5-10 minutes. No need to rinse after soaking. [x]Kiah wound Topical Treatments: Do not apply lotions, creams, or ointments to the skin around the wound bed unless directed as followed:   Apply around the wound: Zinc paste         [x]Wound Location: right lower leg   Apply Primary Dressing to wound:  polymem silver  Secondary Dressing: Extra absorbant pad   Avoid contact of tape with skin if possible.   When to change Dressing: DO NOT

## 2023-09-28 NOTE — PROGRESS NOTES
Multilayer Compression Wrap   (Not Unna) Below the Knee    NAME:  Joycelyn Mott  YOB: 1965  MEDICAL RECORD NUMBER:  [id number]  DATE:  9/28/2023       Removed old Multilayer wrap if present and washed leg with mild soap/water. Applied moisturizing agent to dry skin as needed. Applied primary and secondary dressing as ordered    Applied multilayered dressing below the knee to Right lower leg(s)  (4 Layer Compression Wrap ) . Instructed patient/caregiver not to remove dressing and to keep it clean and dry. Instructed patient/caregiver on complications to report to provider, such as pain, numbness in toes, heavy drainage, and slippage of dressing. Instructed patient on purpose of compression dressing and on activity and exercise recommendations.    Applied per   Guidelines    Electronically signed by Julissa Huang RN on 9/28/2023 at 9:20 AM
is:  talisha     Electronically signed by Cathy Becker RN on 9/28/2023 at 9:13 AM     [address] [place] [address] Information: Should you experience any significant changes in your wound(s) or have questions about your wound care, please contact the [facility] at [phone]. Hours of operation:  Mon:  8AM - 2PM  Tue: 11AM - 5PM  Wed: CLOSED  Thur: 8AM - 4:30PM  Fri:  8AM - 4:30PM  The office is closed on all major holidays. Please give us 24-48 business hours to return your call. These hours of operation are subject to change. If you need help with your wounds and cannot wait until we are available, contact your PCP or go to your preferred emergency room. Call your doctor now or seek immediate medical care if:    You have symptoms of infection, such as: Increased pain, swelling, warmth, or redness. Red streaks leading from the area. Pus draining from the area. A fever.          [] Patient unable to sign Discharge Instructions given to ECF/Transportation/POA     Electronically signed by Perla Boyd MD on 9/28/2023 at 9:32 AM

## 2023-10-02 ENCOUNTER — HOSPITAL ENCOUNTER (OUTPATIENT)
Dept: WOUND CARE | Age: 58
Discharge: HOME | End: 2023-10-02
Payer: COMMERCIAL

## 2023-10-02 VITALS
RESPIRATION RATE: 16 BRPM | DIASTOLIC BLOOD PRESSURE: 82 MMHG | HEART RATE: 63 BPM | SYSTOLIC BLOOD PRESSURE: 146 MMHG | TEMPERATURE: 97.5 F

## 2023-10-02 DIAGNOSIS — I87.311 IDIOPATHIC CHRONIC VENOUS HYPERTENSION OF RIGHT LOWER EXTREMITY WITH ULCER (HCC): ICD-10-CM

## 2023-10-02 DIAGNOSIS — Z91.199 NONCOMPLIANCE: ICD-10-CM

## 2023-10-02 DIAGNOSIS — I89.0 CHRONIC ACQUIRED LYMPHEDEMA: ICD-10-CM

## 2023-10-02 DIAGNOSIS — I83.009 VENOUS ULCER (HCC): ICD-10-CM

## 2023-10-02 DIAGNOSIS — L97.909 VENOUS ULCER (HCC): ICD-10-CM

## 2023-10-02 DIAGNOSIS — L97.919 IDIOPATHIC CHRONIC VENOUS HYPERTENSION OF RIGHT LOWER EXTREMITY WITH ULCER (HCC): ICD-10-CM

## 2023-10-02 DIAGNOSIS — I89.0 LYMPHEDEMA: Primary | ICD-10-CM

## 2023-10-02 PROCEDURE — 11042 DBRDMT SUBQ TIS 1ST 20SQCM/<: CPT | Performed by: SPECIALIST

## 2023-10-02 PROCEDURE — 29581 APPL MULTLAYER CMPRN SYS LEG: CPT

## 2023-10-02 PROCEDURE — 6370000000 HC RX 637 (ALT 250 FOR IP): Performed by: SPECIALIST

## 2023-10-02 PROCEDURE — 11045 DBRDMT SUBQ TISS EACH ADDL: CPT

## 2023-10-02 PROCEDURE — 11042 DBRDMT SUBQ TIS 1ST 20SQCM/<: CPT

## 2023-10-02 PROCEDURE — 11045 DBRDMT SUBQ TISS EACH ADDL: CPT | Performed by: SPECIALIST

## 2023-10-02 RX ORDER — BACITRACIN ZINC AND POLYMYXIN B SULFATE 500; 1000 [USP'U]/G; [USP'U]/G
OINTMENT TOPICAL ONCE
OUTPATIENT
Start: 2023-10-02 | End: 2023-10-02

## 2023-10-02 RX ORDER — LIDOCAINE HYDROCHLORIDE 40 MG/ML
SOLUTION TOPICAL ONCE
Status: COMPLETED | OUTPATIENT
Start: 2023-10-02 | End: 2023-10-02

## 2023-10-02 RX ORDER — LIDOCAINE HYDROCHLORIDE 20 MG/ML
JELLY TOPICAL ONCE
OUTPATIENT
Start: 2023-10-02 | End: 2023-10-02

## 2023-10-02 RX ORDER — LIDOCAINE HYDROCHLORIDE 40 MG/ML
SOLUTION TOPICAL ONCE
OUTPATIENT
Start: 2023-10-02 | End: 2023-10-02

## 2023-10-02 RX ORDER — SODIUM CHLOR/HYPOCHLOROUS ACID 0.033 %
SOLUTION, IRRIGATION IRRIGATION ONCE
OUTPATIENT
Start: 2023-10-02 | End: 2023-10-02

## 2023-10-02 RX ORDER — GINSENG 100 MG
CAPSULE ORAL ONCE
OUTPATIENT
Start: 2023-10-02 | End: 2023-10-02

## 2023-10-02 RX ORDER — LIDOCAINE 40 MG/G
CREAM TOPICAL ONCE
OUTPATIENT
Start: 2023-10-02 | End: 2023-10-02

## 2023-10-02 RX ORDER — IBUPROFEN 200 MG
TABLET ORAL ONCE
OUTPATIENT
Start: 2023-10-02 | End: 2023-10-02

## 2023-10-02 RX ORDER — GENTAMICIN SULFATE 1 MG/G
OINTMENT TOPICAL ONCE
OUTPATIENT
Start: 2023-10-02 | End: 2023-10-02

## 2023-10-02 RX ORDER — CLOBETASOL PROPIONATE 0.5 MG/G
OINTMENT TOPICAL ONCE
OUTPATIENT
Start: 2023-10-02 | End: 2023-10-02

## 2023-10-02 RX ORDER — BETAMETHASONE DIPROPIONATE 0.05 %
OINTMENT (GRAM) TOPICAL ONCE
OUTPATIENT
Start: 2023-10-02 | End: 2023-10-02

## 2023-10-02 RX ORDER — LIDOCAINE 50 MG/G
OINTMENT TOPICAL ONCE
OUTPATIENT
Start: 2023-10-02 | End: 2023-10-02

## 2023-10-02 RX ORDER — TRIAMCINOLONE ACETONIDE 1 MG/G
OINTMENT TOPICAL ONCE
OUTPATIENT
Start: 2023-10-02 | End: 2023-10-02

## 2023-10-02 RX ADMIN — LIDOCAINE HYDROCHLORIDE: 40 SOLUTION TOPICAL at 09:21

## 2023-10-02 ASSESSMENT — PAIN SCALES - GENERAL: PAINLEVEL_OUTOF10: 4

## 2023-10-02 ASSESSMENT — PAIN DESCRIPTION - PAIN TYPE: TYPE: CHRONIC PAIN

## 2023-10-02 ASSESSMENT — PAIN DESCRIPTION - LOCATION: LOCATION: LEG

## 2023-10-02 ASSESSMENT — PAIN DESCRIPTION - ORIENTATION: ORIENTATION: RIGHT

## 2023-10-02 ASSESSMENT — PAIN DESCRIPTION - DESCRIPTORS: DESCRIPTORS: BURNING

## 2023-10-02 ASSESSMENT — PAIN DESCRIPTION - FREQUENCY: FREQUENCY: CONTINUOUS

## 2023-10-02 NOTE — PATIENT INSTRUCTIONS
411 [facility] Physician Orders and Discharge Instructions  [address] [place] [address]. [place] [address]  Telephone: [phone]  NAME:  Daphne Amaral  YOB: 1965  MEDICAL RECORD NUMBER:  [id number]  DATE: 10/2/23     Return Appointment:  Return Appointment: With Elliot Guzman MD  in  1 [facility])  [] Return Appointment for a Wound Assessment with the nurse on:     Future Appointments   Date Time Provider 4600  46[facility]   10/11/2023  1:30 PM 2400 [facility],2Nd Floor: none    Medically necessary services for evaluation and treatment: []Skilled Nursing (using clean technique) []PT (Eval & Treat) []OT (Eval & Treat) []Social Work []Dietician []Other:      Wound care instructions: If you smoke we ask that you refrain from smoking. Smoking inhibits wounds from healing. When taking antibiotics take the entire prescription as ordered. Do not stop taking until medication is all gone unless otherwise instructed. Exercise as tolerated. Keep weight off wounds and reposition every 2 hours if applicable. Do not get wounds wet in the bath or shower unless otherwise instructed by your physician. If your wound is on your foot or leg, you may purchase a cast bag. Please ask at the pharmacy. Wash hands with soap and water prior to and after every dressing change. [x]Wash wounds with: Vashe wash - Apply enough Vashe to soak a piece of gauze and place on wound bed for 5-10 minutes. No need to rinse after soaking. [x]Kiah wound Topical Treatments: Do not apply lotions, creams, or ointments to the skin around the wound bed unless directed as followed:   Apply around the wound: Moisturizing lotion         [x]Wound Location: right lower leg   Apply Primary Dressing to wound: Foam with silver (I.e. Polymem Ag)  Secondary Dressing: 4X4 gauze pad   Avoid contact of tape with skin if possible.   When to change

## 2023-10-02 NOTE — PROGRESS NOTES
Multilayer Compression Wrap   (Not Unna) Below the Knee    NAME:  Oswaldo Villalta  YOB: 1965  MEDICAL RECORD NUMBER:  [id number]  DATE:  10/2/2023       Removed old Multilayer wrap if present and washed leg with mild soap/water. Applied moisturizing agent to dry skin as needed. Applied primary and secondary dressing as ordered    Applied multilayered dressing below the knee to Right lower leg(s)  (4 Layer Compression Wrap ) . Instructed patient/caregiver not to remove dressing and to keep it clean and dry. Instructed patient/caregiver on complications to report to provider, such as pain, numbness in toes, heavy drainage, and slippage of dressing. Instructed patient on purpose of compression dressing and on activity and exercise recommendations.    Applied per   Guidelines    Electronically signed by Janes Escoto RN on 10/2/2023 at 10:06 AM

## 2023-10-02 NOTE — PROGRESS NOTES
[facility]  Progress Note and Procedure Note      Isidra Sanches  MEDICAL RECORD NUMBER:  [id number]  AGE: 62 y.o. GENDER: male  : 1965  EPISODE DATE:  10/2/2023    Subjective:     Chief Complaint   Patient presents with    Wound Check         HISTORY of PRESENT ILLNESS HPI     Isidra Sanches is a 62 y.o. male who presents today for wound/ulcer evaluation. History of Wound Context: Patient continues follow-up for venous insufficiency with ulceration lymphedema. He is now off IV antibiotics. He remains in a 4 layer Coban compression.   He states he is using upon occasion his lymphedema pumps and he describes no breakthrough drainage through 4-layer compression this past week  Wound/Ulcer Pain Timing/Severity: none  Quality of pain: N/A  Severity:  0 / 10   Modifying Factors: None  Associated Signs/Symptoms: edema and drainage    Ulcer Identification:  Ulcer Type: venous    Contributing Factors: edema, venous stasis, lymphedema, and obesity    Acute Wound: N/A not an acute wound    PAST MEDICAL HISTORY        Diagnosis Date    DVT (deep venous thrombosis) (HCC)     Hx of blood clots     Pain and swelling of right lower leg        PAST SURGICAL HISTORY    Past Surgical History:   Procedure Laterality Date    BALLOON ANGIOPLASTY, ARTERY Right 2017    at [address] [place] [address] [place], ESOPHAGUS      SKIN SPLIT GRAFT Right        FAMILY HISTORY    Family History   Problem Relation Age of Onset    Diabetes Mother     Heart Attack Father        SOCIAL HISTORY    Social History     Tobacco Use    Smoking status: Never    Smokeless tobacco: Never   Vaping Use    Vaping Use: Never used   Substance Use Topics    Alcohol use: No    Drug use: No       ALLERGIES    No Known Allergies    MEDICATIONS    Current Outpatient Medications on File Prior to Encounter   Medication Sig Dispense Refill    warfarin (COUMADIN) 5 MG tablet TAKE 1 AND 1/2 TABLET BY MOUTH DAILY EXCEPT ON FRIDAY TAKE TWO

## 2023-10-10 ENCOUNTER — HOSPITAL ENCOUNTER (OUTPATIENT)
Dept: WOUND CARE | Age: 58
Discharge: HOME | End: 2023-10-10
Attending: SPECIALIST
Payer: COMMERCIAL

## 2023-10-10 VITALS
HEART RATE: 71 BPM | DIASTOLIC BLOOD PRESSURE: 77 MMHG | TEMPERATURE: 97.7 F | SYSTOLIC BLOOD PRESSURE: 135 MMHG | RESPIRATION RATE: 16 BRPM

## 2023-10-10 PROCEDURE — 29581 APPL MULTLAYER CMPRN SYS LEG: CPT

## 2023-10-10 ASSESSMENT — PAIN SCALES - GENERAL: PAINLEVEL_OUTOF10: 3

## 2023-10-10 ASSESSMENT — PAIN DESCRIPTION - DESCRIPTORS: DESCRIPTORS: BURNING

## 2023-10-10 ASSESSMENT — PAIN DESCRIPTION - ORIENTATION: ORIENTATION: RIGHT

## 2023-10-10 ASSESSMENT — PAIN DESCRIPTION - LOCATION: LOCATION: LEG

## 2023-10-10 NOTE — PROGRESS NOTES
Multilayer Compression Wrap   (Not Unna) Below the Knee    NAME:  Willma Nissen  YOB: 1965  MEDICAL RECORD NUMBER:  [id number]  DATE:  10/10/2023       Removed old Multilayer wrap if present and washed leg with mild soap/water. Applied moisturizing agent to dry skin as needed. Applied primary and secondary dressing as ordered    Applied multilayered dressing below the knee to Right lower leg(s)  (4 Layer Compression Wrap ) . Instructed patient/caregiver not to remove dressing and to keep it clean and dry. Instructed patient/caregiver on complications to report to provider, such as pain, numbness in toes, heavy drainage, and slippage of dressing. Instructed patient on purpose of compression dressing and on activity and exercise recommendations.    Applied per   Guidelines    Electronically signed by Ria Sharp RN on 10/10/2023 at 12:15 PM

## 2023-10-10 NOTE — PATIENT INSTRUCTIONS
411 [facility] Physician Orders and Discharge Instructions  The [address] [place] [address] [address], [address] [place] [address] East,4Th Floor  Telephone: [phone]    NAME:  Dylon Marino:  1965  MEDICAL RECORD NUMBER:  [id number]  DATE:  10/10/2023      Wound care:  Continue to follow the instructions and recommendations from your last doctor visit. The dressing(s) applied is the same as your last visit. Please refer to your last discharge instruction for the information on your wound care. If there were any changes made, please follow the instructions as written here:     Future Appointments     Future Appointments   Date Time Provider [address]   10/11/2023  1:30  W [facility]   10/13/2023 12:45 PM SCHEDULE, [address] WOUND NURSE VISIT 711 [facility]           Your nurse  is:  Demarco Schroeder     Electronically signed by Jimena Wallace RN on 10/10/2023 at 10:04 AM     [address] [place] [address] Information: Should you experience any significant changes in your wound(s) or have questions about your wound care, please contact the [facility] at [phone]. Our hours vary so please leave a message. Please give us 24-48 hours to return your call. If you need help with your wounds and cannot wait until we are available, contact your PCP or go to the hospital emergency room. Physician orders by:  Dr. Ronaldo Williamson        The information contained in the After Visit Summary has been reviewed with me, the patient and/or responsible adult, by my health care provider(s). I had the opportunity to ask questions regarding this information. I have elected to receive;      [] Patient unable to sign Discharge Instructions.  Given to ECF/Transportation/POA

## 2023-10-11 ENCOUNTER — ANTI-COAG VISIT (OUTPATIENT)
Dept: PHARMACY | Age: 58
End: 2023-10-11
Payer: COMMERCIAL

## 2023-10-11 DIAGNOSIS — I82.5Z1 CHRONIC VENOUS EMBOLISM AND THROMBOSIS OF DEEP VESSELS OF DISTAL END OF RIGHT LOWER EXTREMITY (HCC): Primary | ICD-10-CM

## 2023-10-11 LAB — INTERNATIONAL NORMALIZATION RATIO, POC: 2.9

## 2023-10-11 PROCEDURE — 99211 OFF/OP EST MAY X REQ PHY/QHP: CPT | Performed by: PHARMACIST

## 2023-10-11 PROCEDURE — 85610 PROTHROMBIN TIME: CPT | Performed by: PHARMACIST

## 2023-10-13 ENCOUNTER — HOSPITAL ENCOUNTER (OUTPATIENT)
Dept: WOUND CARE | Age: 58
Discharge: HOME | End: 2023-10-13
Attending: SPECIALIST
Payer: COMMERCIAL

## 2023-10-13 VITALS — TEMPERATURE: 97.7 F | RESPIRATION RATE: 16 BRPM

## 2023-10-13 PROCEDURE — 29581 APPL MULTLAYER CMPRN SYS LEG: CPT

## 2023-10-13 NOTE — PROGRESS NOTES
Multilayer Compression Wrap   (Not Unna) Below the Knee    NAME:  Teresa Yepez  YOB: 1965  MEDICAL RECORD NUMBER:  [id number]  DATE:  10/13/2023       Removed old Multilayer wrap if present and washed leg with mild soap/water. Applied moisturizing agent to dry skin as needed. Applied primary and secondary dressing as ordered    Applied multilayered dressing below the knee to Right lower leg(s)  (4 Layer Compression Wrap ) . Instructed patient/caregiver not to remove dressing and to keep it clean and dry. Instructed patient/caregiver on complications to report to provider, such as pain, numbness in toes, heavy drainage, and slippage of dressing. Instructed patient on purpose of compression dressing and on activity and exercise recommendations.    Applied per   Guidelines    Electronically signed by Rana Dance, RN on 10/13/2023 at 1:01 PM

## 2023-10-13 NOTE — DISCHARGE INSTRUCTIONS
411 [facility] Physician Orders and Discharge Instructions  The [address] [place] [address] [address], [address] [place] [address] East,4Th Floor  Telephone: [phone]    NAME:  Regan Jack:  1965  MEDICAL RECORD NUMBER:  [id number]  DATE:  10/13/2023      Wound care:  Continue to follow the instructions and recommendations from your last doctor visit. The dressing(s) applied is the same as your last visit. Please refer to your last discharge instruction for the information on your wound care. If there were any changes made, please follow the instructions as written here: Pat Yi    Future Appointments     Future Appointments   Date Time Provider [address]   11/22/2023  1:30  W [facility]           Your nurse  is:  ***     Electronically signed by Seema Matos RN on 10/13/2023 at 1:00 PM     [address]: Should you experience any significant changes in your wound(s) or have questions about your wound care, please contact the [facility] at [phone]. Our hours vary so please leave a message. Please give us 24-48 hours to return your call. If you need help with your wounds and cannot wait until we are available, contact your PCP or go to the hospital emergency room. Physician orders by:  Dr. Tatiana Hudson        The information contained in the After Visit Summary has been reviewed with me, the patient and/or responsible adult, by my health care provider(s). I had the opportunity to ask questions regarding this information. I have elected to receive;      [] Patient unable to sign Discharge Instructions.  Given to ECF/Transportation/POA

## 2023-10-19 ENCOUNTER — HOSPITAL ENCOUNTER (OUTPATIENT)
Dept: WOUND CARE | Age: 58
Discharge: HOME | End: 2023-10-19
Attending: SPECIALIST
Payer: COMMERCIAL

## 2023-10-19 VITALS
HEART RATE: 69 BPM | RESPIRATION RATE: 16 BRPM | DIASTOLIC BLOOD PRESSURE: 83 MMHG | TEMPERATURE: 96.7 F | SYSTOLIC BLOOD PRESSURE: 148 MMHG

## 2023-10-19 DIAGNOSIS — I87.311 IDIOPATHIC CHRONIC VENOUS HYPERTENSION OF RIGHT LOWER EXTREMITY WITH ULCER (HCC): ICD-10-CM

## 2023-10-19 DIAGNOSIS — L97.909 VENOUS ULCER (HCC): ICD-10-CM

## 2023-10-19 DIAGNOSIS — I83.009 VENOUS ULCER (HCC): ICD-10-CM

## 2023-10-19 DIAGNOSIS — L97.919 IDIOPATHIC CHRONIC VENOUS HYPERTENSION OF RIGHT LOWER EXTREMITY WITH ULCER (HCC): ICD-10-CM

## 2023-10-19 DIAGNOSIS — Z91.199 NONCOMPLIANCE: ICD-10-CM

## 2023-10-19 DIAGNOSIS — I89.0 LYMPHEDEMA: Primary | ICD-10-CM

## 2023-10-19 DIAGNOSIS — I89.0 CHRONIC ACQUIRED LYMPHEDEMA: ICD-10-CM

## 2023-10-19 PROCEDURE — 29581 APPL MULTLAYER CMPRN SYS LEG: CPT

## 2023-10-19 PROCEDURE — 11045 DBRDMT SUBQ TISS EACH ADDL: CPT

## 2023-10-19 PROCEDURE — 11045 DBRDMT SUBQ TISS EACH ADDL: CPT | Performed by: SPECIALIST

## 2023-10-19 PROCEDURE — 6370000000 HC RX 637 (ALT 250 FOR IP): Performed by: SPECIALIST

## 2023-10-19 PROCEDURE — 11042 DBRDMT SUBQ TIS 1ST 20SQCM/<: CPT

## 2023-10-19 PROCEDURE — 11042 DBRDMT SUBQ TIS 1ST 20SQCM/<: CPT | Performed by: SPECIALIST

## 2023-10-19 RX ORDER — BETAMETHASONE DIPROPIONATE 0.05 %
OINTMENT (GRAM) TOPICAL ONCE
OUTPATIENT
Start: 2023-10-19 | End: 2023-10-19

## 2023-10-19 RX ORDER — BACITRACIN ZINC AND POLYMYXIN B SULFATE 500; 1000 [USP'U]/G; [USP'U]/G
OINTMENT TOPICAL ONCE
OUTPATIENT
Start: 2023-10-19 | End: 2023-10-19

## 2023-10-19 RX ORDER — LIDOCAINE HYDROCHLORIDE 40 MG/ML
SOLUTION TOPICAL ONCE
Status: COMPLETED | OUTPATIENT
Start: 2023-10-19 | End: 2023-10-19

## 2023-10-19 RX ORDER — SODIUM CHLOR/HYPOCHLOROUS ACID 0.033 %
SOLUTION, IRRIGATION IRRIGATION ONCE
OUTPATIENT
Start: 2023-10-19 | End: 2023-10-19

## 2023-10-19 RX ORDER — TRIAMCINOLONE ACETONIDE 1 MG/G
OINTMENT TOPICAL ONCE
OUTPATIENT
Start: 2023-10-19 | End: 2023-10-19

## 2023-10-19 RX ORDER — GINSENG 100 MG
CAPSULE ORAL ONCE
OUTPATIENT
Start: 2023-10-19 | End: 2023-10-19

## 2023-10-19 RX ORDER — LIDOCAINE 40 MG/G
CREAM TOPICAL ONCE
OUTPATIENT
Start: 2023-10-19 | End: 2023-10-19

## 2023-10-19 RX ORDER — LIDOCAINE HYDROCHLORIDE 20 MG/ML
JELLY TOPICAL ONCE
OUTPATIENT
Start: 2023-10-19 | End: 2023-10-19

## 2023-10-19 RX ORDER — CLOBETASOL PROPIONATE 0.5 MG/G
OINTMENT TOPICAL ONCE
OUTPATIENT
Start: 2023-10-19 | End: 2023-10-19

## 2023-10-19 RX ORDER — IBUPROFEN 200 MG
TABLET ORAL ONCE
OUTPATIENT
Start: 2023-10-19 | End: 2023-10-19

## 2023-10-19 RX ORDER — LIDOCAINE HYDROCHLORIDE 40 MG/ML
SOLUTION TOPICAL ONCE
OUTPATIENT
Start: 2023-10-19 | End: 2023-10-19

## 2023-10-19 RX ORDER — LIDOCAINE 50 MG/G
OINTMENT TOPICAL ONCE
OUTPATIENT
Start: 2023-10-19 | End: 2023-10-19

## 2023-10-19 RX ORDER — GENTAMICIN SULFATE 1 MG/G
OINTMENT TOPICAL ONCE
OUTPATIENT
Start: 2023-10-19 | End: 2023-10-19

## 2023-10-19 RX ADMIN — LIDOCAINE HYDROCHLORIDE: 40 SOLUTION TOPICAL at 08:35

## 2023-10-19 ASSESSMENT — PAIN DESCRIPTION - ORIENTATION: ORIENTATION: RIGHT

## 2023-10-19 ASSESSMENT — PAIN DESCRIPTION - DESCRIPTORS: DESCRIPTORS: BURNING

## 2023-10-19 ASSESSMENT — PAIN SCALES - GENERAL: PAINLEVEL_OUTOF10: 3

## 2023-10-19 ASSESSMENT — PAIN DESCRIPTION - LOCATION: LOCATION: LEG

## 2023-10-19 NOTE — PROGRESS NOTES
Multilayer Compression Wrap   (Not Unna) Below the Knee    NAME:  Willma Nissen  YOB: 1965  MEDICAL RECORD NUMBER:  [id number]  DATE:  10/19/2023       Removed old Multilayer wrap if present and washed leg with mild soap/water. Applied moisturizing agent to dry skin as needed. Applied primary and secondary dressing as ordered    Applied multilayered dressing below the knee to Right lower leg(s)  (4 Layer Compression Wrap ) . Instructed patient/caregiver not to remove dressing and to keep it clean and dry. Instructed patient/caregiver on complications to report to provider, such as pain, numbness in toes, heavy drainage, and slippage of dressing. Instructed patient on purpose of compression dressing and on activity and exercise recommendations.    Applied per   Guidelines    Electronically signed by Sonya Hatchet, RN on 10/19/2023 at 8:50 AM
(cm^3) 3.978 cm^3 10/19/23 0842   Distance Tunneling (cm) 0 cm 10/19/23 0823   Tunneling Position ___ O'Clock 0 07/28/23 1337   Undermining Starts ___ O'Clock 0 07/28/23 1337   Undermining Ends___ O'Clock 0 07/28/23 1337   Undermining Maxium Distance (cm) 0 10/19/23 0823   Wound Assessment Fibrin;Pink/red;Granulation tissue 10/19/23 0823   Drainage Amount Small (< 25%) 10/19/23 0823   Drainage Description Brown 10/19/23 0823   Odor Mild 10/19/23 0823   Kiah-wound Assessment Maceration 10/19/23 0823   Margins Undefined edges 10/19/23 0823   Wound Thickness Description not for Pressure Injury Full thickness 10/19/23 0823   Number of days: 450       Wound 09/28/23 Ankle Lateral;Lower;Right #4 (Active)   Wound Image   10/02/23 0904   Wound Etiology Venous 09/28/23 0846   Wound Cleansed Cleansed with saline 10/19/23 0823   Dressing/Treatment Foam impregnated with Ag 10/19/23 0851   Wound Length (cm) 1 cm 10/19/23 0823   Wound Width (cm) 0.7 cm 10/19/23 0823   Wound Depth (cm) 0.1 cm 10/19/23 0823   Wound Surface Area (cm^2) 0.7 cm^2 10/19/23 0823   Change in Wound Size % (l*w) 79.71 10/19/23 0823   Wound Volume (cm^3) 0.07 cm^3 10/19/23 0823   Wound Healing % 80 10/19/23 0823   Post-Procedure Length (cm) 1.1 cm 10/19/23 0842   Post-Procedure Width (cm) 0.8 cm 10/19/23 0842   Post-Procedure Depth (cm) 0.3 cm 10/19/23 0842   Post-Procedure Surface Area (cm^2) 0.88 cm^2 10/19/23 0842   Post-Procedure Volume (cm^3) 0.264 cm^3 10/19/23 0842   Distance Tunneling (cm) 0 cm 10/19/23 0823   Undermining Maxium Distance (cm) 0 10/19/23 0823   Wound Assessment Pink/red 10/19/23 0823   Drainage Amount Small (< 25%) 10/19/23 0823   Drainage Description Brown 10/19/23 0823   Odor Mild 10/19/23 0823   Kiah-wound Assessment Dry/flaky 10/19/23 0823   Margins Defined edges 10/19/23 0823   Wound Thickness Description not for Pressure Injury Full thickness 10/19/23 0823   Number of days: 21          Percent of Wound Debrided: 100%    Total

## 2023-10-19 NOTE — PATIENT INSTRUCTIONS
return your call. These hours of operation are subject to change. If you need help with your wounds and cannot wait until we are available, contact your PCP or go to your preferred emergency room. Call your doctor now or seek immediate medical care if:    You have symptoms of infection, such as: Increased pain, swelling, warmth, or redness. Red streaks leading from the area. Pus draining from the area. A fever.          [] Patient unable to sign Discharge Instructions given to ECF/Transportation/POA

## 2023-10-26 ENCOUNTER — HOSPITAL ENCOUNTER (OUTPATIENT)
Dept: WOUND CARE | Age: 58
Discharge: HOME | End: 2023-10-26
Attending: SPECIALIST
Payer: COMMERCIAL

## 2023-10-26 VITALS
HEART RATE: 71 BPM | RESPIRATION RATE: 16 BRPM | TEMPERATURE: 97.8 F | DIASTOLIC BLOOD PRESSURE: 69 MMHG | SYSTOLIC BLOOD PRESSURE: 152 MMHG

## 2023-10-26 DIAGNOSIS — I83.009 VENOUS ULCER (HCC): ICD-10-CM

## 2023-10-26 DIAGNOSIS — I87.311 IDIOPATHIC CHRONIC VENOUS HYPERTENSION OF RIGHT LOWER EXTREMITY WITH ULCER (HCC): ICD-10-CM

## 2023-10-26 DIAGNOSIS — I89.0 LYMPHEDEMA: Primary | ICD-10-CM

## 2023-10-26 DIAGNOSIS — I89.0 CHRONIC ACQUIRED LYMPHEDEMA: ICD-10-CM

## 2023-10-26 DIAGNOSIS — L97.919 IDIOPATHIC CHRONIC VENOUS HYPERTENSION OF RIGHT LOWER EXTREMITY WITH ULCER (HCC): ICD-10-CM

## 2023-10-26 DIAGNOSIS — Z91.199 NONCOMPLIANCE: ICD-10-CM

## 2023-10-26 DIAGNOSIS — L97.909 VENOUS ULCER (HCC): ICD-10-CM

## 2023-10-26 PROCEDURE — 11042 DBRDMT SUBQ TIS 1ST 20SQCM/<: CPT

## 2023-10-26 PROCEDURE — 6370000000 HC RX 637 (ALT 250 FOR IP): Performed by: SPECIALIST

## 2023-10-26 PROCEDURE — 11045 DBRDMT SUBQ TISS EACH ADDL: CPT

## 2023-10-26 PROCEDURE — 29581 APPL MULTLAYER CMPRN SYS LEG: CPT

## 2023-10-26 RX ORDER — LIDOCAINE HYDROCHLORIDE 20 MG/ML
JELLY TOPICAL ONCE
OUTPATIENT
Start: 2023-10-26 | End: 2023-10-26

## 2023-10-26 RX ORDER — GENTAMICIN SULFATE 1 MG/G
OINTMENT TOPICAL ONCE
OUTPATIENT
Start: 2023-10-26 | End: 2023-10-26

## 2023-10-26 RX ORDER — LIDOCAINE HYDROCHLORIDE 40 MG/ML
SOLUTION TOPICAL ONCE
OUTPATIENT
Start: 2023-10-26 | End: 2023-10-26

## 2023-10-26 RX ORDER — SODIUM CHLOR/HYPOCHLOROUS ACID 0.033 %
SOLUTION, IRRIGATION IRRIGATION ONCE
Status: COMPLETED | OUTPATIENT
Start: 2023-10-26 | End: 2023-10-26

## 2023-10-26 RX ORDER — TRIAMCINOLONE ACETONIDE 1 MG/G
OINTMENT TOPICAL ONCE
OUTPATIENT
Start: 2023-10-26 | End: 2023-10-26

## 2023-10-26 RX ORDER — CLOBETASOL PROPIONATE 0.5 MG/G
OINTMENT TOPICAL ONCE
OUTPATIENT
Start: 2023-10-26 | End: 2023-10-26

## 2023-10-26 RX ORDER — IBUPROFEN 200 MG
TABLET ORAL ONCE
OUTPATIENT
Start: 2023-10-26 | End: 2023-10-26

## 2023-10-26 RX ORDER — LIDOCAINE 40 MG/G
CREAM TOPICAL ONCE
OUTPATIENT
Start: 2023-10-26 | End: 2023-10-26

## 2023-10-26 RX ORDER — LIDOCAINE 50 MG/G
OINTMENT TOPICAL ONCE
OUTPATIENT
Start: 2023-10-26 | End: 2023-10-26

## 2023-10-26 RX ORDER — LIDOCAINE HYDROCHLORIDE 40 MG/ML
SOLUTION TOPICAL ONCE
Status: COMPLETED | OUTPATIENT
Start: 2023-10-26 | End: 2023-10-26

## 2023-10-26 RX ORDER — BACITRACIN ZINC AND POLYMYXIN B SULFATE 500; 1000 [USP'U]/G; [USP'U]/G
OINTMENT TOPICAL ONCE
OUTPATIENT
Start: 2023-10-26 | End: 2023-10-26

## 2023-10-26 RX ORDER — SODIUM CHLOR/HYPOCHLOROUS ACID 0.033 %
SOLUTION, IRRIGATION IRRIGATION ONCE
OUTPATIENT
Start: 2023-10-26 | End: 2023-10-26

## 2023-10-26 RX ORDER — BETAMETHASONE DIPROPIONATE 0.05 %
OINTMENT (GRAM) TOPICAL ONCE
OUTPATIENT
Start: 2023-10-26 | End: 2023-10-26

## 2023-10-26 RX ORDER — GINSENG 100 MG
CAPSULE ORAL ONCE
OUTPATIENT
Start: 2023-10-26 | End: 2023-10-26

## 2023-10-26 RX ADMIN — LIDOCAINE HYDROCHLORIDE: 40 SOLUTION TOPICAL at 08:24

## 2023-10-26 RX ADMIN — Medication: at 08:54

## 2023-10-26 NOTE — PROGRESS NOTES
Multilayer Compression Wrap   (Not Unna) Below the Knee    NAME:  Nataliya Acosta  YOB: 1965  MEDICAL RECORD NUMBER:  [id number]  DATE:  10/26/2023       Removed old Multilayer wrap if present and washed leg with mild soap/water. Applied moisturizing agent to dry skin as needed. Applied primary and secondary dressing as ordered    Applied multilayered dressing below the knee to Right lower leg(s)  (4 Layer Compression Wrap ) . Instructed patient/caregiver not to remove dressing and to keep it clean and dry. Instructed patient/caregiver on complications to report to provider, such as pain, numbness in toes, heavy drainage, and slippage of dressing. Instructed patient on purpose of compression dressing and on activity and exercise recommendations.    Applied per   Guidelines    Electronically signed by Claudette Martins RN on 10/26/2023 at 8:59 AM
salt  3. If diabetic, follow a diabetic diet and check glucose prior to meals or as instructed by your physician. Dietary Supplements(Take twice a day unless instructed otherwise):  [] [place]  [] 30ml ProStat [] Ensure Complete [] Ensure Max/Premier [] Expedite [] Other:    Your nurse  is:  Litzy Del Valle     Electronically signed by Mode Thayer RN on 10/26/2023 at [address] [facility] [address] Information: Should you experience any significant changes in your wound(s) or have questions about your wound care, please contact the [facility] at [phone]. Hours of operation:  Mon:  8AM - 2PM  Tue: 11AM - 5PM  Wed: CLOSED  Thur: 8AM - 4:30PM  Fri:  8AM - 4:30PM  The office is closed on all major holidays. Please give us 24-48 business hours to return your call. These hours of operation are subject to change. If you need help with your wounds and cannot wait until we are available, contact your PCP or go to your preferred emergency room. Call your doctor now or seek immediate medical care if:    You have symptoms of infection, such as: Increased pain, swelling, warmth, or redness. Red streaks leading from the area. Pus draining from the area. A fever.          [] Patient unable to sign Discharge Instructions given to ECF/Transportation/POA     Electronically signed by Claudetta Lincoln, MD on 10/26/2023 at 8:57 AM

## 2023-10-26 NOTE — PATIENT INSTRUCTIONS
call.  These hours of operation are subject to change. If you need help with your wounds and cannot wait until we are available, contact your PCP or go to your preferred emergency room. Call your doctor now or seek immediate medical care if:    You have symptoms of infection, such as: Increased pain, swelling, warmth, or redness. Red streaks leading from the area. Pus draining from the area. A fever.          [] Patient unable to sign Discharge Instructions given to ECF/Transportation/POA

## 2023-11-02 ENCOUNTER — HOSPITAL ENCOUNTER (OUTPATIENT)
Dept: WOUND CARE | Age: 58
Discharge: HOME | End: 2023-11-02
Attending: SPECIALIST
Payer: COMMERCIAL

## 2023-11-02 VITALS
SYSTOLIC BLOOD PRESSURE: 146 MMHG | TEMPERATURE: 97 F | DIASTOLIC BLOOD PRESSURE: 78 MMHG | HEART RATE: 62 BPM | RESPIRATION RATE: 16 BRPM

## 2023-11-02 DIAGNOSIS — I87.311 IDIOPATHIC CHRONIC VENOUS HYPERTENSION OF RIGHT LOWER EXTREMITY WITH ULCER (HCC): ICD-10-CM

## 2023-11-02 DIAGNOSIS — Z91.199 NONCOMPLIANCE: ICD-10-CM

## 2023-11-02 DIAGNOSIS — I83.009 VENOUS ULCER (HCC): ICD-10-CM

## 2023-11-02 DIAGNOSIS — L97.909 VENOUS ULCER (HCC): ICD-10-CM

## 2023-11-02 DIAGNOSIS — I89.0 CHRONIC ACQUIRED LYMPHEDEMA: ICD-10-CM

## 2023-11-02 DIAGNOSIS — I89.0 LYMPHEDEMA: Primary | ICD-10-CM

## 2023-11-02 DIAGNOSIS — L97.919 IDIOPATHIC CHRONIC VENOUS HYPERTENSION OF RIGHT LOWER EXTREMITY WITH ULCER (HCC): ICD-10-CM

## 2023-11-02 PROCEDURE — 29581 APPL MULTLAYER CMPRN SYS LEG: CPT

## 2023-11-02 PROCEDURE — 6370000000 HC RX 637 (ALT 250 FOR IP): Performed by: SPECIALIST

## 2023-11-02 PROCEDURE — 11042 DBRDMT SUBQ TIS 1ST 20SQCM/<: CPT | Performed by: SPECIALIST

## 2023-11-02 PROCEDURE — 11042 DBRDMT SUBQ TIS 1ST 20SQCM/<: CPT

## 2023-11-02 RX ORDER — SODIUM CHLOR/HYPOCHLOROUS ACID 0.033 %
SOLUTION, IRRIGATION IRRIGATION ONCE
Status: COMPLETED | OUTPATIENT
Start: 2023-11-02 | End: 2023-11-02

## 2023-11-02 RX ORDER — LIDOCAINE HYDROCHLORIDE 20 MG/ML
JELLY TOPICAL ONCE
OUTPATIENT
Start: 2023-11-02 | End: 2023-11-02

## 2023-11-02 RX ORDER — LIDOCAINE HYDROCHLORIDE 40 MG/ML
SOLUTION TOPICAL ONCE
Status: COMPLETED | OUTPATIENT
Start: 2023-11-02 | End: 2023-11-02

## 2023-11-02 RX ORDER — TRIAMCINOLONE ACETONIDE 1 MG/G
OINTMENT TOPICAL ONCE
OUTPATIENT
Start: 2023-11-02 | End: 2023-11-02

## 2023-11-02 RX ORDER — LIDOCAINE HYDROCHLORIDE 40 MG/ML
SOLUTION TOPICAL ONCE
OUTPATIENT
Start: 2023-11-02 | End: 2023-11-02

## 2023-11-02 RX ORDER — CLOBETASOL PROPIONATE 0.5 MG/G
OINTMENT TOPICAL ONCE
OUTPATIENT
Start: 2023-11-02 | End: 2023-11-02

## 2023-11-02 RX ORDER — GINSENG 100 MG
CAPSULE ORAL ONCE
OUTPATIENT
Start: 2023-11-02 | End: 2023-11-02

## 2023-11-02 RX ORDER — BACITRACIN ZINC AND POLYMYXIN B SULFATE 500; 1000 [USP'U]/G; [USP'U]/G
OINTMENT TOPICAL ONCE
OUTPATIENT
Start: 2023-11-02 | End: 2023-11-02

## 2023-11-02 RX ORDER — BETAMETHASONE DIPROPIONATE 0.05 %
OINTMENT (GRAM) TOPICAL ONCE
OUTPATIENT
Start: 2023-11-02 | End: 2023-11-02

## 2023-11-02 RX ORDER — LIDOCAINE 40 MG/G
CREAM TOPICAL ONCE
OUTPATIENT
Start: 2023-11-02 | End: 2023-11-02

## 2023-11-02 RX ORDER — LIDOCAINE 50 MG/G
OINTMENT TOPICAL ONCE
OUTPATIENT
Start: 2023-11-02 | End: 2023-11-02

## 2023-11-02 RX ORDER — SODIUM CHLOR/HYPOCHLOROUS ACID 0.033 %
SOLUTION, IRRIGATION IRRIGATION ONCE
OUTPATIENT
Start: 2023-11-02 | End: 2023-11-02

## 2023-11-02 RX ORDER — IBUPROFEN 200 MG
TABLET ORAL ONCE
OUTPATIENT
Start: 2023-11-02 | End: 2023-11-02

## 2023-11-02 RX ORDER — GENTAMICIN SULFATE 1 MG/G
OINTMENT TOPICAL ONCE
OUTPATIENT
Start: 2023-11-02 | End: 2023-11-02

## 2023-11-02 RX ADMIN — Medication: at 09:19

## 2023-11-02 RX ADMIN — LIDOCAINE HYDROCHLORIDE: 40 SOLUTION TOPICAL at 09:10

## 2023-11-02 ASSESSMENT — PAIN DESCRIPTION - DESCRIPTORS: DESCRIPTORS: BURNING

## 2023-11-02 ASSESSMENT — PAIN DESCRIPTION - ORIENTATION: ORIENTATION: RIGHT

## 2023-11-02 ASSESSMENT — PAIN SCALES - GENERAL: PAINLEVEL_OUTOF10: 2

## 2023-11-02 ASSESSMENT — PAIN DESCRIPTION - PAIN TYPE: TYPE: CHRONIC PAIN

## 2023-11-02 ASSESSMENT — PAIN DESCRIPTION - LOCATION: LOCATION: LEG

## 2023-11-02 NOTE — PATIENT INSTRUCTIONS
[facility] Physician Orders and Discharge Instructions  [address] [facility]. [facility]  Telephone: [phone]  NAME:  Jose Buckley  YOB: 1965  MEDICAL RECORD NUMBER:  [id number]  DATE: 10/26/23     Return Appointment:  Return Appointment: With Natalie Miranda MD  in  1 [facility])  [] Return Appointment for a Wound Assessment with the nurse on:     Future Appointments   Date Time Provider 4600  [address]   11/22/2023  1:30 PM 2400 [facility],2Nd Floor: none    Medically necessary services for evaluation and treatment: []Skilled Nursing (using clean technique) []PT (Eval & Treat) []OT (Eval & Treat) []Social Work []Dietician []Other:      Wound care instructions: If you smoke we ask that you refrain from smoking. Smoking inhibits wounds from healing. When taking antibiotics take the entire prescription as ordered. Do not stop taking until medication is all gone unless otherwise instructed. Exercise as tolerated. Keep weight off wounds and reposition every 2 hours if applicable. Do not get wounds wet in the bath or shower unless otherwise instructed by your physician. If your wound is on your foot or leg, you may purchase a cast bag. Please ask at the pharmacy. Wash hands with soap and water prior to and after every dressing change. [x]Wash wounds with: Vashe wash - Apply enough Vashe to soak a piece of gauze and place on wound bed for 5-10 minutes. No need to rinse after soaking. [x]Kiah wound Topical Treatments: Do not apply lotions, creams, or ointments to the skin around the wound bed unless directed as followed:   Apply around the wound: Moisturizing lotion         [x]Wound Location: right lower leg   Apply Primary Dressing to wound: Foam with silver (I.e. Polymem Ag)  Secondary Dressing: 4X4 gauze pad   Avoid contact of tape with skin if possible.   When to change

## 2023-11-02 NOTE — PROGRESS NOTES
Multilayer Compression Wrap   (Not Unna) Below the Knee    NAME:  Horacio Drake  YOB: 1965  MEDICAL RECORD NUMBER:  [id number]  DATE:  11/2/2023       Removed old Multilayer wrap if present and washed leg with mild soap/water. Applied moisturizing agent to dry skin as needed. Applied primary and secondary dressing as ordered    Applied multilayered dressing below the knee to Right lower leg(s)  (4 Layer Compression Wrap ) . Instructed patient/caregiver not to remove dressing and to keep it clean and dry. Instructed patient/caregiver on complications to report to provider, such as pain, numbness in toes, heavy drainage, and slippage of dressing. Instructed patient on purpose of compression dressing and on activity and exercise recommendations.    Applied per   Guidelines    Electronically signed by Verónica Lam RN on 11/2/2023 at 9:33 AM

## 2023-11-09 ENCOUNTER — HOSPITAL ENCOUNTER (OUTPATIENT)
Dept: WOUND CARE | Age: 58
Discharge: HOME | End: 2023-11-09
Attending: SPECIALIST
Payer: COMMERCIAL

## 2023-11-09 VITALS
SYSTOLIC BLOOD PRESSURE: 134 MMHG | DIASTOLIC BLOOD PRESSURE: 62 MMHG | HEART RATE: 73 BPM | TEMPERATURE: 97 F | RESPIRATION RATE: 16 BRPM

## 2023-11-09 DIAGNOSIS — L97.909 VENOUS ULCER (HCC): ICD-10-CM

## 2023-11-09 DIAGNOSIS — I89.0 LYMPHEDEMA: ICD-10-CM

## 2023-11-09 DIAGNOSIS — I83.009 VENOUS ULCER (HCC): ICD-10-CM

## 2023-11-09 DIAGNOSIS — Z91.199 NONCOMPLIANCE: ICD-10-CM

## 2023-11-09 DIAGNOSIS — I87.311 IDIOPATHIC CHRONIC VENOUS HYPERTENSION OF RIGHT LOWER EXTREMITY WITH ULCER (HCC): ICD-10-CM

## 2023-11-09 DIAGNOSIS — L97.919 IDIOPATHIC CHRONIC VENOUS HYPERTENSION OF RIGHT LOWER EXTREMITY WITH ULCER (HCC): ICD-10-CM

## 2023-11-09 DIAGNOSIS — I89.0 CHRONIC ACQUIRED LYMPHEDEMA: Primary | ICD-10-CM

## 2023-11-09 PROCEDURE — 29581 APPL MULTLAYER CMPRN SYS LEG: CPT

## 2023-11-09 PROCEDURE — 11042 DBRDMT SUBQ TIS 1ST 20SQCM/<: CPT | Performed by: SPECIALIST

## 2023-11-09 PROCEDURE — 6370000000 HC RX 637 (ALT 250 FOR IP): Performed by: SPECIALIST

## 2023-11-09 PROCEDURE — 11042 DBRDMT SUBQ TIS 1ST 20SQCM/<: CPT

## 2023-11-09 RX ORDER — BETAMETHASONE DIPROPIONATE 0.05 %
OINTMENT (GRAM) TOPICAL ONCE
OUTPATIENT
Start: 2023-11-09 | End: 2023-11-09

## 2023-11-09 RX ORDER — GINSENG 100 MG
CAPSULE ORAL ONCE
OUTPATIENT
Start: 2023-11-09 | End: 2023-11-09

## 2023-11-09 RX ORDER — LIDOCAINE HYDROCHLORIDE 20 MG/ML
JELLY TOPICAL ONCE
OUTPATIENT
Start: 2023-11-09 | End: 2023-11-09

## 2023-11-09 RX ORDER — IBUPROFEN 200 MG
TABLET ORAL ONCE
OUTPATIENT
Start: 2023-11-09 | End: 2023-11-09

## 2023-11-09 RX ORDER — SODIUM CHLOR/HYPOCHLOROUS ACID 0.033 %
SOLUTION, IRRIGATION IRRIGATION ONCE
OUTPATIENT
Start: 2023-11-09 | End: 2023-11-09

## 2023-11-09 RX ORDER — SODIUM CHLOR/HYPOCHLOROUS ACID 0.033 %
SOLUTION, IRRIGATION IRRIGATION ONCE
Status: COMPLETED | OUTPATIENT
Start: 2023-11-09 | End: 2023-11-09

## 2023-11-09 RX ORDER — CLOBETASOL PROPIONATE 0.5 MG/G
OINTMENT TOPICAL ONCE
OUTPATIENT
Start: 2023-11-09 | End: 2023-11-09

## 2023-11-09 RX ORDER — BACITRACIN ZINC AND POLYMYXIN B SULFATE 500; 1000 [USP'U]/G; [USP'U]/G
OINTMENT TOPICAL ONCE
OUTPATIENT
Start: 2023-11-09 | End: 2023-11-09

## 2023-11-09 RX ORDER — LIDOCAINE 40 MG/G
CREAM TOPICAL ONCE
OUTPATIENT
Start: 2023-11-09 | End: 2023-11-09

## 2023-11-09 RX ORDER — LIDOCAINE HYDROCHLORIDE 40 MG/ML
SOLUTION TOPICAL ONCE
OUTPATIENT
Start: 2023-11-09 | End: 2023-11-09

## 2023-11-09 RX ORDER — LIDOCAINE HYDROCHLORIDE 40 MG/ML
SOLUTION TOPICAL ONCE
Status: COMPLETED | OUTPATIENT
Start: 2023-11-09 | End: 2023-11-09

## 2023-11-09 RX ORDER — TRIAMCINOLONE ACETONIDE 1 MG/G
OINTMENT TOPICAL ONCE
OUTPATIENT
Start: 2023-11-09 | End: 2023-11-09

## 2023-11-09 RX ORDER — LIDOCAINE 50 MG/G
OINTMENT TOPICAL ONCE
OUTPATIENT
Start: 2023-11-09 | End: 2023-11-09

## 2023-11-09 RX ORDER — GENTAMICIN SULFATE 1 MG/G
OINTMENT TOPICAL ONCE
OUTPATIENT
Start: 2023-11-09 | End: 2023-11-09

## 2023-11-09 RX ADMIN — Medication: at 09:30

## 2023-11-09 RX ADMIN — LIDOCAINE HYDROCHLORIDE: 40 SOLUTION TOPICAL at 09:31

## 2023-11-09 ASSESSMENT — PAIN SCALES - GENERAL: PAINLEVEL_OUTOF10: 2

## 2023-11-09 ASSESSMENT — PAIN DESCRIPTION - DESCRIPTORS: DESCRIPTORS: BURNING

## 2023-11-09 ASSESSMENT — PAIN DESCRIPTION - ORIENTATION: ORIENTATION: RIGHT

## 2023-11-09 ASSESSMENT — PAIN DESCRIPTION - FREQUENCY: FREQUENCY: CONTINUOUS

## 2023-11-09 ASSESSMENT — PAIN DESCRIPTION - LOCATION: LOCATION: LEG

## 2023-11-09 NOTE — PROGRESS NOTES
[facility]  Progress Note and Procedure Note      Tangela Stanton  MEDICAL RECORD NUMBER:  [id number]  AGE: 62 y.o. GENDER: male  : 1965  EPISODE DATE:  2023    Subjective:     Chief Complaint   Patient presents with    Wound Check     Right lower leg           HISTORY of PRESENT ILLNESS HPI     Tangela Stanton is a 62 y.o. male who presents today for wound/ulcer evaluation. History of Wound Context: Patient continues follow-up for venous insufficiency with ulceration lymphedema. He is now off IV antibiotics. He remains in a 4 layer Coban compression. He states he is using upon occasion his lymphedema pumps and he describes no breakthrough drainage through 4-layer compression this past week.   He describes no pain this past week  Wound/Ulcer Pain Timing/Severity: none  Quality of pain: N/A  Severity:  0 / 10   Modifying Factors: None  Associated Signs/Symptoms: edema and drainage    Ulcer Identification:  Ulcer Type: venous    Contributing Factors: edema, venous stasis, lymphedema, obesity, and anticoagulation therapy    Acute Wound: N/A not an acute wound    PAST MEDICAL HISTORY        Diagnosis Date    DVT (deep venous thrombosis) (HCC)     Hx of blood clots     Pain and swelling of right lower leg        PAST SURGICAL HISTORY    Past Surgical History:   Procedure Laterality Date    BALLOON ANGIOPLASTY, ARTERY Right 2017    at [address] [facility] [address], ESOPHAGUS      SKIN SPLIT GRAFT Right        FAMILY HISTORY    Family History   Problem Relation Age of Onset    Diabetes Mother     Heart Attack Father        SOCIAL HISTORY    Social History     Tobacco Use    Smoking status: Never    Smokeless tobacco: Never   Vaping Use    Vaping Use: Never used   Substance Use Topics    Alcohol use: No    Drug use: No       ALLERGIES    No Known Allergies    MEDICATIONS    Current Outpatient Medications on File Prior to Encounter   Medication Sig Dispense Refill    warfarin

## 2023-11-09 NOTE — PLAN OF CARE
Insurance Verification Request      Ordering Center: [facility]  [address] [facility] [address] [facility] Phone Number: [phone]  Fax Number: [phone]    Facility NPI: [id number]  Facility Tax ID [id number]    Provider Information:      Provider's Name: Dr. Ignacio Singh   Provider's NPI: Elliot Guzman MD,  NPI: [id number]    Patient Information:     Daphne Amaral  [facility]  [address]   [phone]   : 1965  AGE: 62 y.o. GENDER: male   TODAYS DATE:  2023    Insurance:     PRIMARY INSURANCE:  Plan: Radha Socks AYLA  Coverage: [id number]  Effective Date: 2023  Group Number: [unfilled]  Subscriber Number: [id number] - (Commercial)    Payer/Plan Subscr  Sex Relation Sub.  Ins. ID Effective Group Num   1. ALEXANDRE - Ashley Amaral 1965 Male Self [id number]                                    [address]       Application/product Information:     Benefit Verification Request:    Reason for Request: New Wound    Kerec[employer] Fish Omega 3 FAX#     Trunk/Arms/Legs [id number] (1st 25 sq cm)    kerecis    Has patient been treated with any other Skin Substitute on this Wound:     No        WOUND #: 1, 3    Total Square CM: 10    Procedure setting: Hospital Outpatient Department    Is the Patient currently in a skilled nursing facility: No     Is the wound work related: No    Procedure date: 23    Patient Information:     Additional Dx Codes: I87.311, L97.919    Problem List Items Addressed This Visit          Circulatory    Idiopathic chronic venous hypertension of right lower extremity with ulcer ([address])    Relevant Orders    Initiate Outpatient Wound Care Protocol       Other    Lymphedema    Relevant Orders    Initiate Outpatient Wound Care Protocol    Chronic acquired lymphedema - Primary    Relevant Orders    Initiate Outpatient Wound Care Protocol    Venous ulcer ([address])    Relevant Orders    Initiate Outpatient Wound Care Protocol

## 2023-11-09 NOTE — PROGRESS NOTES
Multilayer Compression Wrap   (Not Unna) Below the Knee    NAME:  Marc Tolentino  YOB: 1965  MEDICAL RECORD NUMBER:  [id number]  DATE:  11/9/2023       Removed old Multilayer wrap if present and washed leg with mild soap/water. Applied moisturizing agent to dry skin as needed. Applied primary and secondary dressing as ordered    Applied multilayered dressing below the knee to Right lower leg(s)  (4 Layer Compression Wrap ) . Instructed patient/caregiver not to remove dressing and to keep it clean and dry. Instructed patient/caregiver on complications to report to provider, such as pain, numbness in toes, heavy drainage, and slippage of dressing. Instructed patient on purpose of compression dressing and on activity and exercise recommendations.    Applied per   Guidelines    Electronically signed by Joaquín Levin RN on 11/9/2023 at 9:40 AM

## 2023-11-09 NOTE — PATIENT INSTRUCTIONS
411 [facility] Physician Orders and Discharge Instructions  [address] [facility]  Telephone: [phone]  NAME:  Gladys Kirk  YOB: 1965  MEDICAL RECORD NUMBER:  [id number]  DATE: 11/9/23     Return Appointment:  Return Appointment: With New Zimmerman MD  in  1 [facility])  [] Return Appointment for a Wound Assessment with the nurse on:     Future Appointments   Date Time Provider [address]   11/22/2023  1:30 PM 2400 [facility],2Nd Floor: none    Medically necessary services for evaluation and treatment: []Skilled Nursing (using clean technique) []PT (Eval & Treat) []OT (Eval & Treat) []Social Work []Dietician []Other:      Wound care instructions: If you smoke we ask that you refrain from smoking. Smoking inhibits wounds from healing. When taking antibiotics take the entire prescription as ordered. Do not stop taking until medication is all gone unless otherwise instructed. Exercise as tolerated. Keep weight off wounds and reposition every 2 hours if applicable. Do not get wounds wet in the bath or shower unless otherwise instructed by your physician. If your wound is on your foot or leg, you may purchase a cast bag. Please ask at the pharmacy. Wash hands with soap and water prior to and after every dressing change. [x]Wash wounds with: Vashe wash - Apply enough Vashe to soak a piece of gauze and place on wound bed for 5-10 minutes. No need to rinse after soaking. [x]Kiah wound Topical Treatments: Do not apply lotions, creams, or ointments to the skin around the wound bed unless directed as followed:   Apply around the wound: Moisturizing lotion         [x]Wound Location: right lower leg   Apply Primary Dressing to wound: Foam with silver (I.e. Polymem Ag)  Secondary Dressing: 4X4 gauze pad   Avoid contact of tape with skin if possible.   When to change

## 2023-11-16 ENCOUNTER — HOSPITAL ENCOUNTER (OUTPATIENT)
Dept: WOUND CARE | Age: 58
Discharge: HOME | End: 2023-11-16
Attending: SPECIALIST
Payer: COMMERCIAL

## 2023-11-16 VITALS
DIASTOLIC BLOOD PRESSURE: 85 MMHG | SYSTOLIC BLOOD PRESSURE: 135 MMHG | TEMPERATURE: 97.5 F | HEART RATE: 69 BPM | RESPIRATION RATE: 16 BRPM

## 2023-11-16 DIAGNOSIS — L97.909 VENOUS ULCER (HCC): ICD-10-CM

## 2023-11-16 DIAGNOSIS — I89.0 CHRONIC ACQUIRED LYMPHEDEMA: Primary | ICD-10-CM

## 2023-11-16 DIAGNOSIS — Z91.199 NONCOMPLIANCE: ICD-10-CM

## 2023-11-16 DIAGNOSIS — I87.311 IDIOPATHIC CHRONIC VENOUS HYPERTENSION OF RIGHT LOWER EXTREMITY WITH ULCER (HCC): ICD-10-CM

## 2023-11-16 DIAGNOSIS — I83.009 VENOUS ULCER (HCC): ICD-10-CM

## 2023-11-16 DIAGNOSIS — I89.0 LYMPHEDEMA: ICD-10-CM

## 2023-11-16 DIAGNOSIS — L97.919 IDIOPATHIC CHRONIC VENOUS HYPERTENSION OF RIGHT LOWER EXTREMITY WITH ULCER (HCC): ICD-10-CM

## 2023-11-16 PROCEDURE — 11042 DBRDMT SUBQ TIS 1ST 20SQCM/<: CPT | Performed by: SPECIALIST

## 2023-11-16 PROCEDURE — 29581 APPL MULTLAYER CMPRN SYS LEG: CPT

## 2023-11-16 PROCEDURE — 11045 DBRDMT SUBQ TISS EACH ADDL: CPT

## 2023-11-16 PROCEDURE — 6370000000 HC RX 637 (ALT 250 FOR IP): Performed by: SPECIALIST

## 2023-11-16 RX ORDER — LIDOCAINE HYDROCHLORIDE 40 MG/ML
SOLUTION TOPICAL ONCE
OUTPATIENT
Start: 2023-11-16 | End: 2023-11-16

## 2023-11-16 RX ORDER — SODIUM CHLOR/HYPOCHLOROUS ACID 0.033 %
SOLUTION, IRRIGATION IRRIGATION ONCE
OUTPATIENT
Start: 2023-11-16 | End: 2023-11-16

## 2023-11-16 RX ORDER — BETAMETHASONE DIPROPIONATE 0.05 %
OINTMENT (GRAM) TOPICAL ONCE
OUTPATIENT
Start: 2023-11-16 | End: 2023-11-16

## 2023-11-16 RX ORDER — BACITRACIN ZINC AND POLYMYXIN B SULFATE 500; 1000 [USP'U]/G; [USP'U]/G
OINTMENT TOPICAL ONCE
OUTPATIENT
Start: 2023-11-16 | End: 2023-11-16

## 2023-11-16 RX ORDER — SODIUM CHLOR/HYPOCHLOROUS ACID 0.033 %
SOLUTION, IRRIGATION IRRIGATION ONCE
Status: COMPLETED | OUTPATIENT
Start: 2023-11-16 | End: 2023-11-16

## 2023-11-16 RX ORDER — CLOBETASOL PROPIONATE 0.5 MG/G
OINTMENT TOPICAL ONCE
OUTPATIENT
Start: 2023-11-16 | End: 2023-11-16

## 2023-11-16 RX ORDER — GENTAMICIN SULFATE 1 MG/G
OINTMENT TOPICAL ONCE
OUTPATIENT
Start: 2023-11-16 | End: 2023-11-16

## 2023-11-16 RX ORDER — LIDOCAINE HYDROCHLORIDE 20 MG/ML
JELLY TOPICAL ONCE
OUTPATIENT
Start: 2023-11-16 | End: 2023-11-16

## 2023-11-16 RX ORDER — LIDOCAINE 50 MG/G
OINTMENT TOPICAL ONCE
OUTPATIENT
Start: 2023-11-16 | End: 2023-11-16

## 2023-11-16 RX ORDER — GINSENG 100 MG
CAPSULE ORAL ONCE
OUTPATIENT
Start: 2023-11-16 | End: 2023-11-16

## 2023-11-16 RX ORDER — IBUPROFEN 200 MG
TABLET ORAL ONCE
OUTPATIENT
Start: 2023-11-16 | End: 2023-11-16

## 2023-11-16 RX ORDER — TRIAMCINOLONE ACETONIDE 1 MG/G
OINTMENT TOPICAL ONCE
OUTPATIENT
Start: 2023-11-16 | End: 2023-11-16

## 2023-11-16 RX ORDER — LIDOCAINE HYDROCHLORIDE 40 MG/ML
SOLUTION TOPICAL ONCE
Status: COMPLETED | OUTPATIENT
Start: 2023-11-16 | End: 2023-11-16

## 2023-11-16 RX ORDER — LIDOCAINE 40 MG/G
CREAM TOPICAL ONCE
OUTPATIENT
Start: 2023-11-16 | End: 2023-11-16

## 2023-11-16 RX ADMIN — Medication: at 09:11

## 2023-11-16 RX ADMIN — LIDOCAINE HYDROCHLORIDE: 40 SOLUTION TOPICAL at 08:39

## 2023-11-16 NOTE — PATIENT INSTRUCTIONS
411 [facility] Physician Orders and Discharge Instructions  [address] [facility]. [facility]  Telephone: [phone]  NAME:  Renaldo Stewart  YOB: 1965  MEDICAL RECORD NUMBER:  [id number]  DATE: 11/16/23     Return Appointment:  Return Appointment: With Abida Carvajal MD  in  1 [facility])  [] Return Appointment for a Wound Assessment with the nurse on:     Future Appointments   Date Time Provider 4600  46[facility]   11/22/2023  1:30 PM 2400 [facility],2Nd Floor: none    Medically necessary services for evaluation and treatment: []Skilled Nursing (using clean technique) []PT (Eval & Treat) []OT (Eval & Treat) []Social Work []Dietician []Other:      Wound care instructions: If you smoke we ask that you refrain from smoking. Smoking inhibits wounds from healing. When taking antibiotics take the entire prescription as ordered. Do not stop taking until medication is all gone unless otherwise instructed. Exercise as tolerated. Keep weight off wounds and reposition every 2 hours if applicable. Do not get wounds wet in the bath or shower unless otherwise instructed by your physician. If your wound is on your foot or leg, you may purchase a cast bag. Please ask at the pharmacy. Wash hands with soap and water prior to and after every dressing change. [x]Wash wounds with: Vashe wash - Apply enough Vashe to soak a piece of gauze and place on wound bed for 5-10 minutes. No need to rinse after soaking. [x]Kiah wound Topical Treatments: Do not apply lotions, creams, or ointments to the skin around the wound bed unless directed as followed:   Apply around the wound: Moisturizing lotion         [x]Wound Location: right lower leg   Apply Primary Dressing to wound: Foam with silver (I.e. Polymem Ag)  Secondary Dressing: 4X4 gauze pad   Avoid contact of tape with skin if possible.   When to change

## 2023-11-16 NOTE — PROGRESS NOTES
Multilayer Compression Wrap   (Not Unna) Below the Knee    NAME:  Willma Nissen  YOB: 1965  MEDICAL RECORD NUMBER:  [id number]  DATE:  11/16/2023       Removed old Multilayer wrap if present and washed leg with mild soap/water. Applied moisturizing agent to dry skin as needed. Applied primary and secondary dressing as ordered    Applied multilayered dressing below the knee to Right lower leg(s)  (4 Layer Compression Wrap ) . Instructed patient/caregiver not to remove dressing and to keep it clean and dry. Instructed patient/caregiver on complications to report to provider, such as pain, numbness in toes, heavy drainage, and slippage of dressing. Instructed patient on purpose of compression dressing and on activity and exercise recommendations.    Applied per   Guidelines    Electronically signed by Bill Hayes RN on 11/16/2023 at 9:14 AM

## 2023-11-16 NOTE — PROGRESS NOTES
[facility]  Progress Note and Procedure Note      Romario Champagne  MEDICAL RECORD NUMBER:  [id number]  AGE: 62 y.o. GENDER: male  : 1965  EPISODE DATE:  2023    Subjective:     Chief Complaint   Patient presents with    Wound Check     Right lower leg         HISTORY of PRESENT ILLNESS HPI     Romario Champagne is a 62 y.o. male who presents today for wound/ulcer evaluation. History of Wound Context: Patient continues follow-up for venous insufficiency with ulceration lymphedema. He is now off IV antibiotics. He remains in a 4 layer Coban compression. He states he is using upon occasion his lymphedema pumps and he describes no breakthrough drainage through 4-layer compression this past week. He describes no pain this past week.   Remains therapeutic with his Coumadin  Wound/Ulcer Pain Timing/Severity: none  Quality of pain: N/A  Severity:  0 / 10   Modifying Factors: None  Associated Signs/Symptoms: edema and drainage    Ulcer Identification:  Ulcer Type: venous    Contributing Factors: edema, venous stasis, lymphedema, obesity, and anticoagulation therapy    Acute Wound: N/A not an acute wound    PAST MEDICAL HISTORY        Diagnosis Date    DVT (deep venous thrombosis) (HCC)     Hx of blood clots     Pain and swelling of right lower leg        PAST SURGICAL HISTORY    Past Surgical History:   Procedure Laterality Date    BALLOON ANGIOPLASTY, ARTERY Right 2017    at [address] [facility] [address], ESOPHAGUS      SKIN SPLIT GRAFT Right        FAMILY HISTORY    Family History   Problem Relation Age of Onset    Diabetes Mother     Heart Attack Father        SOCIAL HISTORY    Social History     Tobacco Use    Smoking status: Never    Smokeless tobacco: Never   Vaping Use    Vaping Use: Never used   Substance Use Topics    Alcohol use: No    Drug use: No       ALLERGIES    No Known Allergies    MEDICATIONS    Current Outpatient Medications on File Prior to Encounter   Medication

## 2023-11-22 ENCOUNTER — HOSPITAL ENCOUNTER (OUTPATIENT)
Dept: WOUND CARE | Age: 58
Discharge: HOME | End: 2023-11-22
Attending: SPECIALIST
Payer: COMMERCIAL

## 2023-11-22 ENCOUNTER — ANTI-COAG VISIT (OUTPATIENT)
Dept: PHARMACY | Age: 58
End: 2023-11-22
Payer: COMMERCIAL

## 2023-11-22 VITALS
RESPIRATION RATE: 16 BRPM | DIASTOLIC BLOOD PRESSURE: 88 MMHG | HEART RATE: 69 BPM | TEMPERATURE: 97 F | SYSTOLIC BLOOD PRESSURE: 144 MMHG

## 2023-11-22 DIAGNOSIS — I82.5Z1 CHRONIC VENOUS EMBOLISM AND THROMBOSIS OF DEEP VESSELS OF DISTAL END OF RIGHT LOWER EXTREMITY (HCC): Primary | ICD-10-CM

## 2023-11-22 DIAGNOSIS — Z91.199 NONCOMPLIANCE: ICD-10-CM

## 2023-11-22 DIAGNOSIS — I87.311 IDIOPATHIC CHRONIC VENOUS HYPERTENSION OF RIGHT LOWER EXTREMITY WITH ULCER (HCC): ICD-10-CM

## 2023-11-22 DIAGNOSIS — I89.0 LYMPHEDEMA: ICD-10-CM

## 2023-11-22 DIAGNOSIS — I83.009 VENOUS ULCER (HCC): ICD-10-CM

## 2023-11-22 DIAGNOSIS — L97.909 VENOUS ULCER (HCC): ICD-10-CM

## 2023-11-22 DIAGNOSIS — I89.0 CHRONIC ACQUIRED LYMPHEDEMA: Primary | ICD-10-CM

## 2023-11-22 DIAGNOSIS — L97.919 IDIOPATHIC CHRONIC VENOUS HYPERTENSION OF RIGHT LOWER EXTREMITY WITH ULCER (HCC): ICD-10-CM

## 2023-11-22 LAB — INTERNATIONAL NORMALIZATION RATIO, POC: 2.7

## 2023-11-22 PROCEDURE — 85610 PROTHROMBIN TIME: CPT | Performed by: PHARMACIST

## 2023-11-22 PROCEDURE — 11042 DBRDMT SUBQ TIS 1ST 20SQCM/<: CPT | Performed by: SPECIALIST

## 2023-11-22 PROCEDURE — 99211 OFF/OP EST MAY X REQ PHY/QHP: CPT | Performed by: PHARMACIST

## 2023-11-22 PROCEDURE — 11042 DBRDMT SUBQ TIS 1ST 20SQCM/<: CPT

## 2023-11-22 PROCEDURE — 29581 APPL MULTLAYER CMPRN SYS LEG: CPT

## 2023-11-22 RX ORDER — LIDOCAINE HYDROCHLORIDE 20 MG/ML
JELLY TOPICAL ONCE
OUTPATIENT
Start: 2023-11-22 | End: 2023-11-22

## 2023-11-22 RX ORDER — TRIAMCINOLONE ACETONIDE 1 MG/G
OINTMENT TOPICAL ONCE
OUTPATIENT
Start: 2023-11-22 | End: 2023-11-22

## 2023-11-22 RX ORDER — GENTAMICIN SULFATE 1 MG/G
OINTMENT TOPICAL ONCE
OUTPATIENT
Start: 2023-11-22 | End: 2023-11-22

## 2023-11-22 RX ORDER — LIDOCAINE 40 MG/G
CREAM TOPICAL ONCE
OUTPATIENT
Start: 2023-11-22 | End: 2023-11-22

## 2023-11-22 RX ORDER — LIDOCAINE HYDROCHLORIDE 40 MG/ML
SOLUTION TOPICAL ONCE
OUTPATIENT
Start: 2023-11-22 | End: 2023-11-22

## 2023-11-22 RX ORDER — BETAMETHASONE DIPROPIONATE 0.05 %
OINTMENT (GRAM) TOPICAL ONCE
OUTPATIENT
Start: 2023-11-22 | End: 2023-11-22

## 2023-11-22 RX ORDER — LIDOCAINE HYDROCHLORIDE 40 MG/ML
SOLUTION TOPICAL ONCE
Status: DISCONTINUED | OUTPATIENT
Start: 2023-11-22 | End: 2023-11-23 | Stop reason: HOSPADM

## 2023-11-22 RX ORDER — BACITRACIN ZINC AND POLYMYXIN B SULFATE 500; 1000 [USP'U]/G; [USP'U]/G
OINTMENT TOPICAL ONCE
OUTPATIENT
Start: 2023-11-22 | End: 2023-11-22

## 2023-11-22 RX ORDER — SODIUM CHLOR/HYPOCHLOROUS ACID 0.033 %
SOLUTION, IRRIGATION IRRIGATION ONCE
OUTPATIENT
Start: 2023-11-22 | End: 2023-11-22

## 2023-11-22 RX ORDER — CLOBETASOL PROPIONATE 0.5 MG/G
OINTMENT TOPICAL ONCE
OUTPATIENT
Start: 2023-11-22 | End: 2023-11-22

## 2023-11-22 RX ORDER — GINSENG 100 MG
CAPSULE ORAL ONCE
OUTPATIENT
Start: 2023-11-22 | End: 2023-11-22

## 2023-11-22 RX ORDER — LIDOCAINE 50 MG/G
OINTMENT TOPICAL ONCE
OUTPATIENT
Start: 2023-11-22 | End: 2023-11-22

## 2023-11-22 RX ORDER — IBUPROFEN 200 MG
TABLET ORAL ONCE
OUTPATIENT
Start: 2023-11-22 | End: 2023-11-22

## 2023-11-22 ASSESSMENT — PAIN DESCRIPTION - LOCATION: LOCATION: LEG

## 2023-11-22 ASSESSMENT — PAIN DESCRIPTION - PAIN TYPE: TYPE: CHRONIC PAIN

## 2023-11-22 ASSESSMENT — PAIN DESCRIPTION - FREQUENCY: FREQUENCY: CONTINUOUS

## 2023-11-22 ASSESSMENT — PAIN DESCRIPTION - DESCRIPTORS: DESCRIPTORS: BURNING

## 2023-11-22 ASSESSMENT — PAIN SCALES - GENERAL: PAINLEVEL_OUTOF10: 2

## 2023-11-22 ASSESSMENT — PAIN DESCRIPTION - ORIENTATION: ORIENTATION: RIGHT

## 2023-11-22 NOTE — PROGRESS NOTES
ANTICOAGULATION SERVICE    Bassem Dorsey is a 62 y.o. male with PMHx significant for chronic DVT (last in Jan, 2019) who presents to clinic 11/22/2023 for anticoagulation monitoring and adjustment. Patient has been taking warfarin for 15+ years.      Anticoagulation Indication(s):  DVT    Referring Physician:  Dr. Moncada Ser  Goal INR Range:  2-3  Duration of Anticoagulation Therapy:  Indefinite  Time of day dose taken:  AM  Product patient has at home:  warfarin 5 mg (peach)    INR Summary                            Warfarin regimen (mg)  Date INR   A/P    Sun Mon Tue Wed Thu Fri Sat Mg/wk  11/22 2.7 At goal, continue  7.5 5 7.5 7.5 7.5 5 7.5 47.5  10/11 2.9 At goal, continue  7.5 5 7.5 7.5 7.5 5 7.5 47.5  8/28 2.7  At goal, continue  7.5 5 7.5 7.5 7.5 5 7.5 47.5  8/2 2.37 Per lab    7/7 2.4  At goal, continue  7.5 5 7.5 7.5 7.5 5 7.5 47.5  5/26 2.7  At goal, continue  7.5 5 7.5 7.5 7.5 5 7.5 47.5  4/28 3.2 Above goal, continue  7.5 5 7.5 7.5 7.5 5 7.5 47.5  2/16 2.9 At goal, continue  7.5 5 7.5 7.5 7.5 5 7.5 47.5  1/9 2.9 At goal, continue  7.5 5 7.5 7.5 7.5 5 7.5 47.5  12/5 2.5 At goal, continue  7.5 5 7.5 7.5 7.5 5 7.5 47.5  11/7 2.3 At goal, continue  7.5 5 7.5 7.5 7.5 5 7.5 47.5  10/10 2.9 At goal, continue  7.5 5 7.5 7.5 7.5 5 7.5 47.5  7/27 3.1 Above goal, continue    7.5 5 7.5 7.5 7.5 5 7.5 47.5  7/6 3.1 Above goal, decrease  7.5 5 7.5 7.5 7.5 5 7.5 47.5  6/22 2.4 At goal, resume prv dose 7.5 7.5 7.5 7.5 7.5 5 7.5 50  6/1 2.6 At goal, continue   7.5 5 7.5 5 7.5 5 7.5 45  5/18 4.1 Above goal, dec (abx)  7.5 5 7.5 5 7.5 5 7.5 45  4/6 2.8 At goal, continue  7.5 7.5 7.5 7.5 7.5 5 7.5 50  12/1 2.9 At goal, continue  7.5 7.5 7.5 7.5 7.5 5 7.5 50  11/10 2.4 At goal, continue   7.5 7.5 7.5 7.5 7.5 5 7.5 50  10/27 4.4 Above goal, hold+dec  7.5 7.5 7.5 7.5 0/7.5 5 7.5 50  8/25 2.9 At goal, continue  7.5 7.5 7.5 7.5 7.5 7.5 7.5 52.5  7/14 2.5 At goal, continue   7.5 7.5 7.5 7.5 7.5 7.5 7.5 52.5  6/14 2.9 At goal,

## 2023-11-22 NOTE — PATIENT INSTRUCTIONS
411 [facility] Physician Orders and Discharge Instructions  [address] [facility]. [facility]  Telephone: [phone]  NAME:  Rodney Bonilla  YOB: 1965  MEDICAL RECORD NUMBER:  [id number]  DATE: 11/22/23     Return Appointment:  Return Appointment: With Kathy Lombardi MD  in  1 [facility])  [] Return Appointment for a Wound Assessment with the nurse on:     Future Appointments   Date Time Provider 4600  [facility]   11/22/2023  1:30 PM 2400 [facility],2Nd Floor: none    Medically necessary services for evaluation and treatment: []Skilled Nursing (using clean technique) []PT (Eval & Treat) []OT (Eval & Treat) []Social Work []Dietician []Other:      Wound care instructions: If you smoke we ask that you refrain from smoking. Smoking inhibits wounds from healing. When taking antibiotics take the entire prescription as ordered. Do not stop taking until medication is all gone unless otherwise instructed. Exercise as tolerated. Keep weight off wounds and reposition every 2 hours if applicable. Do not get wounds wet in the bath or shower unless otherwise instructed by your physician. If your wound is on your foot or leg, you may purchase a cast bag. Please ask at the pharmacy. Wash hands with soap and water prior to and after every dressing change. [x]Wash wounds with: Vashe wash - Apply enough Vashe to soak a piece of gauze and place on wound bed for 5-10 minutes. No need to rinse after soaking. [x]Kiah wound Topical Treatments: Do not apply lotions, creams, or ointments to the skin around the wound bed unless directed as followed:   Apply around the wound: Moisturizing lotion         [x]Wound Location: right lower leg   Apply Primary Dressing to wound: Foam with silver (I.e. Polymem Ag)  Secondary Dressing: 4X4 gauze pad   Avoid contact of tape with skin if possible.   When to change

## 2023-11-22 NOTE — PROGRESS NOTES
Multilayer Compression Wrap   (Not Unna) Below the Knee    NAME:  Horacio Drake  YOB: 1965  MEDICAL RECORD NUMBER:  [id number]  DATE:  11/22/2023       Removed old Multilayer wrap if present and washed leg with mild soap/water. Applied moisturizing agent to dry skin as needed. Applied primary and secondary dressing as ordered    Applied multilayered dressing below the knee to Right lower leg(s)  (4 Layer Compression Wrap ) . Instructed patient/caregiver not to remove dressing and to keep it clean and dry. Instructed patient/caregiver on complications to report to provider, such as pain, numbness in toes, heavy drainage, and slippage of dressing. Instructed patient on purpose of compression dressing and on activity and exercise recommendations.    Applied per   Guidelines    Electronically signed by Olga Rachel RN on 11/22/2023 at 12:23 PM
insufficiency, lymphedema and reduce occurrence of venous ulcers    [x] Edema Control: 30-40mmHG  Apply: Compression Stocking on the Left leg  Apply every morning immediately when getting up. Remove every night before going to bed unless instructed otherwise     Elevate leg(s) above the level of the heart for 30 minutes 4-5 times a day and/or when sitting. Avoid prolonged standing in one place. [x] Lymphedema Therapy:  Wear Lymphedema pumps twice a day at settings prescribed by your physician. Avoid prolonged standing in one place. Dietary:  Important dietary reminders:  1. Increase Protein intake (i.e. Lean meats, fish, eggs, legumes, and yogurt)  2. No added salt  3. If diabetic, follow a diabetic diet and check glucose prior to meals or as instructed by your physician. Dietary Supplements(Take twice a day unless instructed otherwise):  [] Dorina Doing  [] 30ml ProStat [] Ensure Complete [] Ensure Max/Premier [] Expedite [] Other:    Your nurse  is:  Alma Delia Butterfield     Electronically signed by Bill Hayes RN on 11/22/2023 at 12:16 PM     [address] [place] [address] Information: Should you experience any significant changes in your wound(s) or have questions about your wound care, please contact the [facility] at [phone]. Hours of operation:  Mon:  8AM - 2PM  Tue: 11AM - 5PM  Wed: CLOSED  Thur: 8AM - 4:30PM  Fri:  8AM - 4:30PM  The office is closed on all major holidays. Please give us 24-48 business hours to return your call. These hours of operation are subject to change. If you need help with your wounds and cannot wait until we are available, contact your PCP or go to your preferred emergency room. Call your doctor now or seek immediate medical care if:    You have symptoms of infection, such as: Increased pain, swelling, warmth, or redness. Red streaks leading from the area. Pus draining from the area. A fever.          [] Patient unable to sign Discharge Instructions

## 2023-11-30 ENCOUNTER — HOSPITAL ENCOUNTER (OUTPATIENT)
Dept: WOUND CARE | Age: 58
Discharge: HOME | End: 2023-11-30
Attending: SPECIALIST
Payer: COMMERCIAL

## 2023-11-30 VITALS
HEART RATE: 67 BPM | TEMPERATURE: 96.8 F | DIASTOLIC BLOOD PRESSURE: 87 MMHG | RESPIRATION RATE: 16 BRPM | SYSTOLIC BLOOD PRESSURE: 145 MMHG

## 2023-11-30 DIAGNOSIS — I89.0 CHRONIC ACQUIRED LYMPHEDEMA: Primary | ICD-10-CM

## 2023-11-30 DIAGNOSIS — I89.0 LYMPHEDEMA: ICD-10-CM

## 2023-11-30 DIAGNOSIS — I83.009 VENOUS ULCER (HCC): ICD-10-CM

## 2023-11-30 DIAGNOSIS — L97.919 IDIOPATHIC CHRONIC VENOUS HYPERTENSION OF RIGHT LOWER EXTREMITY WITH ULCER (HCC): ICD-10-CM

## 2023-11-30 DIAGNOSIS — I87.311 IDIOPATHIC CHRONIC VENOUS HYPERTENSION OF RIGHT LOWER EXTREMITY WITH ULCER (HCC): ICD-10-CM

## 2023-11-30 DIAGNOSIS — Z91.199 NONCOMPLIANCE: ICD-10-CM

## 2023-11-30 DIAGNOSIS — L97.909 VENOUS ULCER (HCC): ICD-10-CM

## 2023-11-30 PROCEDURE — 6370000000 HC RX 637 (ALT 250 FOR IP): Performed by: SPECIALIST

## 2023-11-30 PROCEDURE — 11042 DBRDMT SUBQ TIS 1ST 20SQCM/<: CPT | Performed by: SPECIALIST

## 2023-11-30 PROCEDURE — 29581 APPL MULTLAYER CMPRN SYS LEG: CPT

## 2023-11-30 PROCEDURE — 11042 DBRDMT SUBQ TIS 1ST 20SQCM/<: CPT

## 2023-11-30 RX ORDER — SODIUM CHLOR/HYPOCHLOROUS ACID 0.033 %
SOLUTION, IRRIGATION IRRIGATION ONCE
OUTPATIENT
Start: 2023-11-30 | End: 2023-11-30

## 2023-11-30 RX ORDER — LIDOCAINE HYDROCHLORIDE 40 MG/ML
SOLUTION TOPICAL ONCE
OUTPATIENT
Start: 2023-11-30 | End: 2023-11-30

## 2023-11-30 RX ORDER — BACITRACIN ZINC AND POLYMYXIN B SULFATE 500; 1000 [USP'U]/G; [USP'U]/G
OINTMENT TOPICAL ONCE
OUTPATIENT
Start: 2023-11-30 | End: 2023-11-30

## 2023-11-30 RX ORDER — LIDOCAINE HYDROCHLORIDE 20 MG/ML
JELLY TOPICAL ONCE
OUTPATIENT
Start: 2023-11-30 | End: 2023-11-30

## 2023-11-30 RX ORDER — LIDOCAINE 40 MG/G
CREAM TOPICAL ONCE
OUTPATIENT
Start: 2023-11-30 | End: 2023-11-30

## 2023-11-30 RX ORDER — IBUPROFEN 200 MG
TABLET ORAL ONCE
OUTPATIENT
Start: 2023-11-30 | End: 2023-11-30

## 2023-11-30 RX ORDER — BETAMETHASONE DIPROPIONATE 0.05 %
OINTMENT (GRAM) TOPICAL ONCE
OUTPATIENT
Start: 2023-11-30 | End: 2023-11-30

## 2023-11-30 RX ORDER — GENTAMICIN SULFATE 1 MG/G
OINTMENT TOPICAL ONCE
OUTPATIENT
Start: 2023-11-30 | End: 2023-11-30

## 2023-11-30 RX ORDER — TRIAMCINOLONE ACETONIDE 1 MG/G
OINTMENT TOPICAL ONCE
OUTPATIENT
Start: 2023-11-30 | End: 2023-11-30

## 2023-11-30 RX ORDER — CLOBETASOL PROPIONATE 0.5 MG/G
OINTMENT TOPICAL ONCE
OUTPATIENT
Start: 2023-11-30 | End: 2023-11-30

## 2023-11-30 RX ORDER — LIDOCAINE HYDROCHLORIDE 40 MG/ML
SOLUTION TOPICAL ONCE
Status: COMPLETED | OUTPATIENT
Start: 2023-11-30 | End: 2023-11-30

## 2023-11-30 RX ORDER — GINSENG 100 MG
CAPSULE ORAL ONCE
OUTPATIENT
Start: 2023-11-30 | End: 2023-11-30

## 2023-11-30 RX ORDER — LIDOCAINE 50 MG/G
OINTMENT TOPICAL ONCE
OUTPATIENT
Start: 2023-11-30 | End: 2023-11-30

## 2023-11-30 RX ADMIN — LIDOCAINE HYDROCHLORIDE: 40 SOLUTION TOPICAL at 08:38

## 2023-11-30 ASSESSMENT — PAIN DESCRIPTION - LOCATION: LOCATION: LEG

## 2023-11-30 ASSESSMENT — PAIN DESCRIPTION - PAIN TYPE: TYPE: CHRONIC PAIN

## 2023-11-30 ASSESSMENT — PAIN SCALES - GENERAL: PAINLEVEL_OUTOF10: 2

## 2023-11-30 ASSESSMENT — PAIN DESCRIPTION - DESCRIPTORS: DESCRIPTORS: BURNING

## 2023-11-30 ASSESSMENT — PAIN DESCRIPTION - ORIENTATION: ORIENTATION: RIGHT

## 2023-11-30 NOTE — PROGRESS NOTES
Multilayer Compression Wrap   (Not Unna) Below the Knee    NAME:  Rodney Bonilla  YOB: 1965  MEDICAL RECORD NUMBER:  [id number]  DATE:  11/30/2023       Removed old Multilayer wrap if present and washed leg with mild soap/water. Applied moisturizing agent to dry skin as needed. Applied primary and secondary dressing as ordered    Applied multilayered dressing below the knee to Right lower leg(s)  (4 Layer Compression Wrap ) . Instructed patient/caregiver not to remove dressing and to keep it clean and dry. Instructed patient/caregiver on complications to report to provider, such as pain, numbness in toes, heavy drainage, and slippage of dressing. Instructed patient on purpose of compression dressing and on activity and exercise recommendations.    Applied per   Guidelines    Electronically signed by Andrew Yan RN on 11/30/2023 at 8:58 AM
Cleansed Cleansed with saline 11/30/23 0833   Dressing/Treatment Foam impregnated with Ag 11/22/23 1210   Wound Length (cm) 1.2 cm 11/30/23 0833   Wound Width (cm) 4 cm 11/30/23 0833   Wound Depth (cm) 0.1 cm 11/30/23 0833   Wound Surface Area (cm^2) 4.8 cm^2 11/30/23 0833   Change in Wound Size % (l*w) 89.47 11/30/23 0833   Wound Volume (cm^3) 0.48 cm^3 11/30/23 0833   Wound Healing % 89 11/30/23 0833   Post-Procedure Length (cm) 1.3 cm 11/30/23 0844   Post-Procedure Width (cm) 4.1 cm 11/30/23 0844   Post-Procedure Depth (cm) 0.3 cm 11/30/23 0844   Post-Procedure Surface Area (cm^2) 5.33 cm^2 11/30/23 0844   Post-Procedure Volume (cm^3) 1.599 cm^3 11/30/23 0844   Distance Tunneling (cm) 0 cm 11/30/23 0833   Tunneling Position ___ O'Clock 0 07/28/23 1337   Undermining Starts ___ O'Clock 0 07/28/23 1337   Undermining Ends___ O'Clock 0 07/28/23 1337   Undermining Maxium Distance (cm) 0 11/30/23 0833   Wound Assessment Fibrin;Pink/red 11/30/23 0833   Drainage Amount Small (< 25%) 11/30/23 0833   Drainage Description Brown 11/30/23 0833   Odor Mild 11/30/23 0833   Kiah-wound Assessment Maceration 11/30/23 0833   Margins Defined edges 11/30/23 0833   Wound Thickness Description not for Pressure Injury Full thickness 11/30/23 0833   Number of days: 492       Wound 07/25/22 Leg Right;Posterior; Lower #3 (Active)   Wound Image   11/02/23 0932   Wound Etiology Venous 07/25/22 1040   Wound Cleansed Cleansed with saline; Vashe 11/30/23 0833   Dressing/Treatment Foam impregnated with Ag 11/22/23 1210   Wound Length (cm) 2 cm 11/30/23 0833   Wound Width (cm) 2.7 cm 11/30/23 0833   Wound Depth (cm) 0.1 cm 11/30/23 0833   Wound Surface Area (cm^2) 5.4 cm^2 11/30/23 0833   Change in Wound Size % (l*w) 87.73 11/30/23 0833   Wound Volume (cm^3) 0.54 cm^3 11/30/23 0833   Wound Healing % 88 11/30/23 0833   Post-Procedure Length (cm) 2.1 cm 11/30/23 0844   Post-Procedure Width (cm) 2.8 cm 11/30/23 0844   Post-Procedure Depth (cm) 0.3 cm

## 2023-11-30 NOTE — PATIENT INSTRUCTIONS
411 [facility] Physician Orders and Discharge Instructions  [address] [address] [address] [place] [address]. [facility]  Telephone: [phone]  NAME:  Bassem Dorsey  YOB: 1965  MEDICAL RECORD NUMBER:  [id number]  DATE: 11/30/23     Return Appointment:  Return Appointment: With Cortney Gómez MD  in  1 [facility])  [] Return Appointment for a Wound Assessment with the nurse on:     Future Appointments   Date Time Provider 4600  46[facility]   1/17/2024  1:30 PM 2400 [facility],2Nd Floor: none    Medically necessary services for evaluation and treatment: []Skilled Nursing (using clean technique) []PT (Eval & Treat) []OT (Eval & Treat) []Social Work []Dietician []Other:      Wound care instructions: If you smoke we ask that you refrain from smoking. Smoking inhibits wounds from healing. When taking antibiotics take the entire prescription as ordered. Do not stop taking until medication is all gone unless otherwise instructed. Exercise as tolerated. Keep weight off wounds and reposition every 2 hours if applicable. Do not get wounds wet in the bath or shower unless otherwise instructed by your physician. If your wound is on your foot or leg, you may purchase a cast bag. Please ask at the pharmacy. Wash hands with soap and water prior to and after every dressing change. [x]Wash wounds with: Vashe wash - Apply enough Vashe to soak a piece of gauze and place on wound bed for 5-10 minutes. No need to rinse after soaking. [x]Kiah wound Topical Treatments: Do not apply lotions, creams, or ointments to the skin around the wound bed unless directed as followed:   Apply around the wound: Moisturizing lotion         [x]Wound Location: right lower leg wounds   Apply Primary Dressing to wound: Foam with silver (I.e. Polymem Ag)  Secondary Dressing: 4X4 gauze pad   Avoid contact of tape with skin if possible.   When to

## 2023-12-07 ENCOUNTER — HOSPITAL ENCOUNTER (OUTPATIENT)
Dept: WOUND CARE | Age: 58
Discharge: HOME | End: 2023-12-07
Attending: SPECIALIST
Payer: COMMERCIAL

## 2023-12-07 VITALS
SYSTOLIC BLOOD PRESSURE: 129 MMHG | RESPIRATION RATE: 16 BRPM | TEMPERATURE: 97 F | HEART RATE: 71 BPM | DIASTOLIC BLOOD PRESSURE: 82 MMHG

## 2023-12-07 DIAGNOSIS — I89.0 LYMPHEDEMA: ICD-10-CM

## 2023-12-07 DIAGNOSIS — I89.0 CHRONIC ACQUIRED LYMPHEDEMA: Primary | ICD-10-CM

## 2023-12-07 DIAGNOSIS — L97.909 VENOUS ULCER (HCC): ICD-10-CM

## 2023-12-07 DIAGNOSIS — I83.009 VENOUS ULCER (HCC): ICD-10-CM

## 2023-12-07 DIAGNOSIS — L97.919 IDIOPATHIC CHRONIC VENOUS HYPERTENSION OF RIGHT LOWER EXTREMITY WITH ULCER (HCC): ICD-10-CM

## 2023-12-07 DIAGNOSIS — Z91.199 NONCOMPLIANCE: ICD-10-CM

## 2023-12-07 DIAGNOSIS — I87.311 IDIOPATHIC CHRONIC VENOUS HYPERTENSION OF RIGHT LOWER EXTREMITY WITH ULCER (HCC): ICD-10-CM

## 2023-12-07 PROCEDURE — 29581 APPL MULTLAYER CMPRN SYS LEG: CPT

## 2023-12-07 PROCEDURE — 6370000000 HC RX 637 (ALT 250 FOR IP): Performed by: SPECIALIST

## 2023-12-07 PROCEDURE — 11042 DBRDMT SUBQ TIS 1ST 20SQCM/<: CPT

## 2023-12-07 PROCEDURE — C5271 LOW COST SKIN SUBSTITUTE APP: HCPCS

## 2023-12-07 RX ORDER — IBUPROFEN 200 MG
TABLET ORAL ONCE
OUTPATIENT
Start: 2023-12-07 | End: 2023-12-07

## 2023-12-07 RX ORDER — LIDOCAINE HYDROCHLORIDE 20 MG/ML
JELLY TOPICAL ONCE
OUTPATIENT
Start: 2023-12-07 | End: 2023-12-07

## 2023-12-07 RX ORDER — CLOBETASOL PROPIONATE 0.5 MG/G
OINTMENT TOPICAL ONCE
OUTPATIENT
Start: 2023-12-07 | End: 2023-12-07

## 2023-12-07 RX ORDER — LIDOCAINE HYDROCHLORIDE 40 MG/ML
SOLUTION TOPICAL ONCE
Status: COMPLETED | OUTPATIENT
Start: 2023-12-07 | End: 2023-12-07

## 2023-12-07 RX ORDER — GENTAMICIN SULFATE 1 MG/G
OINTMENT TOPICAL ONCE
OUTPATIENT
Start: 2023-12-07 | End: 2023-12-07

## 2023-12-07 RX ORDER — TRIAMCINOLONE ACETONIDE 1 MG/G
OINTMENT TOPICAL ONCE
OUTPATIENT
Start: 2023-12-07 | End: 2023-12-07

## 2023-12-07 RX ORDER — GINSENG 100 MG
CAPSULE ORAL ONCE
OUTPATIENT
Start: 2023-12-07 | End: 2023-12-07

## 2023-12-07 RX ORDER — LIDOCAINE HYDROCHLORIDE 40 MG/ML
SOLUTION TOPICAL ONCE
OUTPATIENT
Start: 2023-12-07 | End: 2023-12-07

## 2023-12-07 RX ORDER — LIDOCAINE 50 MG/G
OINTMENT TOPICAL ONCE
OUTPATIENT
Start: 2023-12-07 | End: 2023-12-07

## 2023-12-07 RX ORDER — BACITRACIN ZINC AND POLYMYXIN B SULFATE 500; 1000 [USP'U]/G; [USP'U]/G
OINTMENT TOPICAL ONCE
OUTPATIENT
Start: 2023-12-07 | End: 2023-12-07

## 2023-12-07 RX ORDER — LIDOCAINE 40 MG/G
CREAM TOPICAL ONCE
OUTPATIENT
Start: 2023-12-07 | End: 2023-12-07

## 2023-12-07 RX ORDER — BETAMETHASONE DIPROPIONATE 0.05 %
OINTMENT (GRAM) TOPICAL ONCE
OUTPATIENT
Start: 2023-12-07 | End: 2023-12-07

## 2023-12-07 RX ORDER — SODIUM CHLOR/HYPOCHLOROUS ACID 0.033 %
SOLUTION, IRRIGATION IRRIGATION ONCE
OUTPATIENT
Start: 2023-12-07 | End: 2023-12-07

## 2023-12-07 RX ADMIN — LIDOCAINE HYDROCHLORIDE: 40 SOLUTION TOPICAL at 08:49

## 2023-12-07 ASSESSMENT — PAIN DESCRIPTION - LOCATION: LOCATION: LEG

## 2023-12-07 ASSESSMENT — PAIN DESCRIPTION - FREQUENCY: FREQUENCY: CONTINUOUS

## 2023-12-07 ASSESSMENT — PAIN SCALES - GENERAL: PAINLEVEL_OUTOF10: 2

## 2023-12-07 ASSESSMENT — PAIN DESCRIPTION - ORIENTATION: ORIENTATION: LEFT

## 2023-12-07 ASSESSMENT — PAIN DESCRIPTION - PAIN TYPE: TYPE: CHRONIC PAIN

## 2023-12-07 ASSESSMENT — PAIN DESCRIPTION - DESCRIPTORS: DESCRIPTORS: BURNING

## 2023-12-07 NOTE — PROGRESS NOTES
Kerecis Omega3 Treatment Note    NAME:  Abhishek Domínguez  YOB: 1965  MEDICAL RECORD NUMBER:  [id number]  DATE:  12/7/2023    Goal:  Patient will receive safe and proper application of skin substitute. Patient will comply with caring for dressing, and reporting complications. The expiration date is checked immediately before use., The package was intact prior to use and no damage was noted., Angeles Harpin is stored at room temperature.,  Angeles Harpin was hydrated with sterile normal saline per the provider.,  Angeles Harpin was removed from protective sterile packaging by the provider and applied to the prepared ulcer bed. ,  Angeles Harpin was applied to wounds #1 and #3, right lower anterior  and right lower posterior leg wounds  , Keresis Omega 3 and affixed with steri-strips by the provider.,  Angeles Harpin was covered with non-adherent ulcer dressing. , Applied steri strips over non-adherent., Applied dry gauze and/or roll gauze. , Applied appropriate off-loading device. , The patient/caregiver was instructed not to remove the dressing and to keep it clean and dry., The patient/family/caregiver was instructed on the need for offloading and elevation of the affected extremity and on using the prescribed offloading device. , and The patient/family/caregiver was instructed on signs and symptoms of complication to report, such as draining through dressing, dressing falling down/slipping, getting wet, or severe pain or tingling.  Guidelines followed  Fatimah Sovereign may only be used every 12 months per wound. Date of first application of Kerecis Omega 3 for this current wound is December 7, 2023.     Application # 1   Electronically signed by Jennifer Boateng RN on 12/7/2023 at 9:18 AM
Multilayer Compression Wrap   (Not Unna) Below the Knee    NAME:  Horacio Drake  YOB: 1965  MEDICAL RECORD NUMBER:  [id number]  DATE:  12/7/2023       Removed old Multilayer wrap if present and washed leg with mild soap/water. Applied moisturizing agent to dry skin as needed. Applied primary and secondary dressing as ordered    Applied multilayered dressing below the knee to Right lower leg(s)  (4 Layer Compression Wrap ) . Instructed patient/caregiver not to remove dressing and to keep it clean and dry. Instructed patient/caregiver on complications to report to provider, such as pain, numbness in toes, heavy drainage, and slippage of dressing. Instructed patient on purpose of compression dressing and on activity and exercise recommendations.    Applied per  [employer] Guidelines    Electronically signed by Elizabeth Su RN on 12/7/2023 at 9:25 AM
[id number]  DATE: 12/7/23     Return Appointment:  Return Appointment: With Paige Sotelo MD  in  1 [facility])  [] Return Appointment for a Wound Assessment with the nurse on:     Future Appointments   Date Time Provider 4600  46[facility]   1/17/2024  1:30 PM 2400 [facility],2Nd Floor: none    Medically necessary services for evaluation and treatment: []Skilled Nursing (using clean technique) []PT (Eval & Treat) []OT (Eval & Treat) []Social Work []Dietician []Other:      Wound care instructions: If you smoke we ask that you refrain from smoking. Smoking inhibits wounds from healing. When taking antibiotics take the entire prescription as ordered. Do not stop taking until medication is all gone unless otherwise instructed. Exercise as tolerated. Keep weight off wounds and reposition every 2 hours if applicable. Do not get wounds wet in the bath or shower unless otherwise instructed by your physician. If your wound is on your foot or leg, you may purchase a cast bag. Please ask at the pharmacy. Wash hands with soap and water prior to and after every dressing change. [x]Wash wounds with: Vashe wash - Apply enough Vashe to soak a piece of gauze and place on wound bed for 5-10 minutes. No need to rinse after soaking. [x]Kiah wound Topical Treatments: Do not apply lotions, creams, or ointments to the skin around the wound bed unless directed as followed:   Apply around the wound: Nothing         [x]Wound Location: right lower anterior  lateral leg ( wound #2)   Apply Primary Dressing to wound: Foam with silver (I.e. Polymem Ag)  Secondary Dressing: 4X4 gauze pad   Avoid contact of tape with skin if possible.   When to change Dressing: DO NOT CHANGE      [x]Application of a biologic skin substitute: Yes: wounds #1 and #3 right anterior lower  and right posterior lower leg wounds : kerecis applied, covered with mepitel, steri strips, gauze  This is a highly advanced

## 2023-12-07 NOTE — PATIENT INSTRUCTIONS
411 [facility] Physician Orders and Discharge Instructions  [address] [place] [address]. [place] [address]  Telephone: [phone]  NAME:  Rosette Elizabeth  YOB: 1965  MEDICAL RECORD NUMBER:  [id number]  DATE: 12/7/23     Return Appointment:  Return Appointment: With Susanne Vincent MD  in  1 [facility])  [] Return Appointment for a Wound Assessment with the nurse on:     Future Appointments   Date Time Provider 4600  46[facility]   1/17/2024  1:30 PM 2400 [facility],2Nd Floor: none    Medically necessary services for evaluation and treatment: []Skilled Nursing (using clean technique) []PT (Eval & Treat) []OT (Eval & Treat) []Social Work []Dietician []Other:      Wound care instructions: If you smoke we ask that you refrain from smoking. Smoking inhibits wounds from healing. When taking antibiotics take the entire prescription as ordered. Do not stop taking until medication is all gone unless otherwise instructed. Exercise as tolerated. Keep weight off wounds and reposition every 2 hours if applicable. Do not get wounds wet in the bath or shower unless otherwise instructed by your physician. If your wound is on your foot or leg, you may purchase a cast bag. Please ask at the pharmacy. Wash hands with soap and water prior to and after every dressing change. [x]Wash wounds with: Vashe wash - Apply enough Vashe to soak a piece of gauze and place on wound bed for 5-10 minutes. No need to rinse after soaking. [x]Kiah wound Topical Treatments: Do not apply lotions, creams, or ointments to the skin around the wound bed unless directed as followed:   Apply around the wound: Nothing         [x]Wound Location: right lower anterior  lateral leg ( wound #2)   Apply Primary Dressing to wound:  Foam with silver (I.e. Polymem Ag)  Secondary Dressing: 4X4 gauze pad   Avoid contact of tape with skin if

## 2023-12-14 ENCOUNTER — HOSPITAL ENCOUNTER (OUTPATIENT)
Dept: WOUND CARE | Age: 58
Discharge: HOME | End: 2023-12-14
Attending: SPECIALIST
Payer: COMMERCIAL

## 2023-12-14 VITALS
HEART RATE: 67 BPM | TEMPERATURE: 97 F | SYSTOLIC BLOOD PRESSURE: 147 MMHG | DIASTOLIC BLOOD PRESSURE: 67 MMHG | RESPIRATION RATE: 16 BRPM

## 2023-12-14 DIAGNOSIS — L97.919 IDIOPATHIC CHRONIC VENOUS HYPERTENSION OF RIGHT LOWER EXTREMITY WITH ULCER (HCC): ICD-10-CM

## 2023-12-14 DIAGNOSIS — I87.311 IDIOPATHIC CHRONIC VENOUS HYPERTENSION OF RIGHT LOWER EXTREMITY WITH ULCER (HCC): ICD-10-CM

## 2023-12-14 DIAGNOSIS — I83.009 VENOUS ULCER (HCC): ICD-10-CM

## 2023-12-14 DIAGNOSIS — I89.0 CHRONIC ACQUIRED LYMPHEDEMA: Primary | ICD-10-CM

## 2023-12-14 DIAGNOSIS — Z91.199 NONCOMPLIANCE: ICD-10-CM

## 2023-12-14 DIAGNOSIS — L97.909 VENOUS ULCER (HCC): ICD-10-CM

## 2023-12-14 DIAGNOSIS — I89.0 LYMPHEDEMA: ICD-10-CM

## 2023-12-14 PROCEDURE — 11042 DBRDMT SUBQ TIS 1ST 20SQCM/<: CPT

## 2023-12-14 PROCEDURE — 29581 APPL MULTLAYER CMPRN SYS LEG: CPT

## 2023-12-14 PROCEDURE — 15271 SKIN SUB GRAFT TRNK/ARM/LEG: CPT

## 2023-12-14 PROCEDURE — 6370000000 HC RX 637 (ALT 250 FOR IP): Performed by: SPECIALIST

## 2023-12-14 PROCEDURE — 15271 SKIN SUB GRAFT TRNK/ARM/LEG: CPT | Performed by: SPECIALIST

## 2023-12-14 RX ORDER — BETAMETHASONE DIPROPIONATE 0.05 %
OINTMENT (GRAM) TOPICAL ONCE
OUTPATIENT
Start: 2023-12-14 | End: 2023-12-14

## 2023-12-14 RX ORDER — LIDOCAINE HYDROCHLORIDE 40 MG/ML
SOLUTION TOPICAL ONCE
OUTPATIENT
Start: 2023-12-14 | End: 2023-12-14

## 2023-12-14 RX ORDER — SODIUM CHLOR/HYPOCHLOROUS ACID 0.033 %
SOLUTION, IRRIGATION IRRIGATION ONCE
OUTPATIENT
Start: 2023-12-14 | End: 2023-12-14

## 2023-12-14 RX ORDER — LIDOCAINE HYDROCHLORIDE 20 MG/ML
JELLY TOPICAL ONCE
OUTPATIENT
Start: 2023-12-14 | End: 2023-12-14

## 2023-12-14 RX ORDER — BACITRACIN ZINC AND POLYMYXIN B SULFATE 500; 1000 [USP'U]/G; [USP'U]/G
OINTMENT TOPICAL ONCE
OUTPATIENT
Start: 2023-12-14 | End: 2023-12-14

## 2023-12-14 RX ORDER — LIDOCAINE HYDROCHLORIDE 40 MG/ML
SOLUTION TOPICAL ONCE
Status: COMPLETED | OUTPATIENT
Start: 2023-12-14 | End: 2023-12-14

## 2023-12-14 RX ORDER — CLOBETASOL PROPIONATE 0.5 MG/G
OINTMENT TOPICAL ONCE
OUTPATIENT
Start: 2023-12-14 | End: 2023-12-14

## 2023-12-14 RX ORDER — LIDOCAINE 50 MG/G
OINTMENT TOPICAL ONCE
OUTPATIENT
Start: 2023-12-14 | End: 2023-12-14

## 2023-12-14 RX ORDER — GINSENG 100 MG
CAPSULE ORAL ONCE
OUTPATIENT
Start: 2023-12-14 | End: 2023-12-14

## 2023-12-14 RX ORDER — IBUPROFEN 200 MG
TABLET ORAL ONCE
OUTPATIENT
Start: 2023-12-14 | End: 2023-12-14

## 2023-12-14 RX ORDER — TRIAMCINOLONE ACETONIDE 1 MG/G
OINTMENT TOPICAL ONCE
OUTPATIENT
Start: 2023-12-14 | End: 2023-12-14

## 2023-12-14 RX ORDER — GENTAMICIN SULFATE 1 MG/G
OINTMENT TOPICAL ONCE
OUTPATIENT
Start: 2023-12-14 | End: 2023-12-14

## 2023-12-14 RX ORDER — LIDOCAINE 40 MG/G
CREAM TOPICAL ONCE
OUTPATIENT
Start: 2023-12-14 | End: 2023-12-14

## 2023-12-14 RX ADMIN — LIDOCAINE HYDROCHLORIDE: 40 SOLUTION TOPICAL at 08:32

## 2023-12-14 ASSESSMENT — PAIN DESCRIPTION - FREQUENCY: FREQUENCY: CONTINUOUS

## 2023-12-14 ASSESSMENT — PAIN DESCRIPTION - PAIN TYPE: TYPE: CHRONIC PAIN

## 2023-12-14 ASSESSMENT — PAIN DESCRIPTION - ORIENTATION: ORIENTATION: LEFT

## 2023-12-14 ASSESSMENT — PAIN DESCRIPTION - DESCRIPTORS: DESCRIPTORS: BURNING

## 2023-12-14 ASSESSMENT — PAIN SCALES - GENERAL: PAINLEVEL_OUTOF10: 4

## 2023-12-14 ASSESSMENT — PAIN DESCRIPTION - LOCATION: LOCATION: LEG

## 2023-12-14 NOTE — PROGRESS NOTES
Multilayer Compression Wrap   (Not Unna) Below the Knee    NAME:  Marc Tolentino  YOB: 1965  MEDICAL RECORD NUMBER:  [id number]  DATE:  12/14/2023       Removed old Multilayer wrap if present and washed leg with mild soap/water. Applied moisturizing agent to dry skin as needed. Applied primary and secondary dressing as ordered    Applied multilayered dressing below the knee to Right lower leg(s)  (4 Layer Compression Wrap ) . Instructed patient/caregiver not to remove dressing and to keep it clean and dry. Instructed patient/caregiver on complications to report to provider, such as pain, numbness in toes, heavy drainage, and slippage of dressing. Instructed patient on purpose of compression dressing and on activity and exercise recommendations.    Applied per   Guidelines    Electronically signed by Joaquín Levin RN on 12/14/2023 at 9:15 AM

## 2023-12-14 NOTE — PROGRESS NOTES
Kerecis Omega3 Treatment Note    NAME:  Rodney Bonilla  YOB: 1965  MEDICAL RECORD NUMBER:  [id number]  DATE:  12/14/2023    Goal:  Patient will receive safe and proper application of skin substitute. Patient will comply with caring for dressing, and reporting complications. The expiration date is checked immediately before use., The package was intact prior to use and no damage was noted., Ole Reasons is stored at room temperature.,  Ole Reasons was hydrated with sterile normal saline per the provider.,  Ole Reasons was removed from protective sterile packaging by the provider and applied to the prepared ulcer bed. ,  Ole Reasons was applied to wounds #1 and #3 - right lower medial leg and right lower posterior leg wound  , Keresis Omega 3 and affixed with steri-strips by the provider.,  Ole Reasons was covered with non-adherent ulcer dressing. , Applied gauze  over non-adherent., Applied dry gauze and/or roll gauze. , Applied appropriate off-loading device. , The patient/caregiver was instructed not to remove the dressing and to keep it clean and dry., The patient/family/caregiver was instructed on the need for offloading and elevation of the affected extremity and on using the prescribed offloading device. , and The patient/family/caregiver was instructed on signs and symptoms of complication to report, such as draining through dressing, dressing falling down/slipping, getting wet, or severe pain or tingling.  Guidelines followed  Fabiana Mixonasin may only be used every 12 months per wound. Date of first application of Kerecis Omega 3 for this current wound is December 7, 2023.     Application # 2     Electronically signed by Andrew Yan RN on 12/14/2023 at 9:11 AM

## 2023-12-14 NOTE — PATIENT INSTRUCTIONS
411 [facility] Physician Orders and Discharge Instructions  1800 [facility] [place]   [address] [place] [address]. [place] [address]  Telephone: [phone]  NAME:  Iman Olmstead  YOB: 1965  MEDICAL RECORD NUMBER:  [id number]  DATE: 12/14/23     Return Appointment:  Return Appointment: With Eric Hare MD  in  1 [facility])  [] Return Appointment for a Wound Assessment with the nurse on:     Future Appointments   Date Time Provider [address]   1/17/2024  1:30 PM 4900 [facility] [place]: none    Medically necessary services for evaluation and treatment: []Skilled Nursing (using clean technique) []PT (Eval & Treat) []OT (Eval & Treat) []Social Work []Dietician []Other:      Wound care instructions: If you smoke we ask that you refrain from smoking. Smoking inhibits wounds from healing. When taking antibiotics take the entire prescription as ordered. Do not stop taking until medication is all gone unless otherwise instructed. Exercise as tolerated. Keep weight off wounds and reposition every 2 hours if applicable. Do not get wounds wet in the bath or shower unless otherwise instructed by your physician. If your wound is on your foot or leg, you may purchase a cast bag. Please ask at the pharmacy. Wash hands with soap and water prior to and after every dressing change. [x]Wash wounds with: Vashe wash - Apply enough Vashe to soak a piece of gauze and place on wound bed for 5-10 minutes. No need to rinse after soaking. [x]Kiah wound Topical Treatments: Do not apply lotions, creams, or ointments to the skin around the wound bed unless directed as followed:   Apply around the wound: No-Sting barrier film and Zinc paste         [x]Wound Location: right  lower anterior lateral leg- wound #2   Apply Primary Dressing to wound:  Foam with silver (I.e. Polymem Ag)  Secondary Dressing: 4X4 gauze pad   Avoid contact of

## 2023-12-28 ENCOUNTER — HOSPITAL ENCOUNTER (OUTPATIENT)
Dept: WOUND CARE | Age: 58
Discharge: HOME | End: 2023-12-28
Attending: SPECIALIST
Payer: COMMERCIAL

## 2023-12-28 VITALS
SYSTOLIC BLOOD PRESSURE: 110 MMHG | HEART RATE: 80 BPM | TEMPERATURE: 96 F | DIASTOLIC BLOOD PRESSURE: 80 MMHG | RESPIRATION RATE: 16 BRPM

## 2023-12-28 DIAGNOSIS — I89.0 CHRONIC ACQUIRED LYMPHEDEMA: Primary | ICD-10-CM

## 2023-12-28 DIAGNOSIS — I87.311 IDIOPATHIC CHRONIC VENOUS HYPERTENSION OF RIGHT LOWER EXTREMITY WITH ULCER (HCC): ICD-10-CM

## 2023-12-28 DIAGNOSIS — L97.919 IDIOPATHIC CHRONIC VENOUS HYPERTENSION OF RIGHT LOWER EXTREMITY WITH ULCER (HCC): ICD-10-CM

## 2023-12-28 DIAGNOSIS — L97.909 VENOUS ULCER (HCC): ICD-10-CM

## 2023-12-28 DIAGNOSIS — I89.0 LYMPHEDEMA: ICD-10-CM

## 2023-12-28 DIAGNOSIS — Z91.199 NONCOMPLIANCE: ICD-10-CM

## 2023-12-28 DIAGNOSIS — I83.009 VENOUS ULCER (HCC): ICD-10-CM

## 2023-12-28 PROCEDURE — 87070 CULTURE OTHR SPECIMN AEROBIC: CPT

## 2023-12-28 PROCEDURE — 11042 DBRDMT SUBQ TIS 1ST 20SQCM/<: CPT

## 2023-12-28 PROCEDURE — 87186 SC STD MICRODIL/AGAR DIL: CPT

## 2023-12-28 PROCEDURE — 29581 APPL MULTLAYER CMPRN SYS LEG: CPT

## 2023-12-28 PROCEDURE — 87205 SMEAR GRAM STAIN: CPT

## 2023-12-28 PROCEDURE — 6370000000 HC RX 637 (ALT 250 FOR IP): Performed by: SPECIALIST

## 2023-12-28 PROCEDURE — 11042 DBRDMT SUBQ TIS 1ST 20SQCM/<: CPT | Performed by: SPECIALIST

## 2023-12-28 PROCEDURE — 87077 CULTURE AEROBIC IDENTIFY: CPT

## 2023-12-28 RX ORDER — IBUPROFEN 200 MG
TABLET ORAL ONCE
OUTPATIENT
Start: 2023-12-28 | End: 2023-12-28

## 2023-12-28 RX ORDER — LIDOCAINE HYDROCHLORIDE 20 MG/ML
JELLY TOPICAL ONCE
OUTPATIENT
Start: 2023-12-28 | End: 2023-12-28

## 2023-12-28 RX ORDER — GENTAMICIN SULFATE 1 MG/G
OINTMENT TOPICAL ONCE
OUTPATIENT
Start: 2023-12-28 | End: 2023-12-28

## 2023-12-28 RX ORDER — LIDOCAINE 40 MG/G
CREAM TOPICAL ONCE
OUTPATIENT
Start: 2023-12-28 | End: 2023-12-28

## 2023-12-28 RX ORDER — TRIAMCINOLONE ACETONIDE 1 MG/G
OINTMENT TOPICAL ONCE
OUTPATIENT
Start: 2023-12-28 | End: 2023-12-28

## 2023-12-28 RX ORDER — LIDOCAINE 50 MG/G
OINTMENT TOPICAL ONCE
OUTPATIENT
Start: 2023-12-28 | End: 2023-12-28

## 2023-12-28 RX ORDER — GINSENG 100 MG
CAPSULE ORAL ONCE
OUTPATIENT
Start: 2023-12-28 | End: 2023-12-28

## 2023-12-28 RX ORDER — BETAMETHASONE DIPROPIONATE 0.05 %
OINTMENT (GRAM) TOPICAL ONCE
OUTPATIENT
Start: 2023-12-28 | End: 2023-12-28

## 2023-12-28 RX ORDER — CLOBETASOL PROPIONATE 0.5 MG/G
OINTMENT TOPICAL ONCE
OUTPATIENT
Start: 2023-12-28 | End: 2023-12-28

## 2023-12-28 RX ORDER — BACITRACIN ZINC AND POLYMYXIN B SULFATE 500; 1000 [USP'U]/G; [USP'U]/G
OINTMENT TOPICAL ONCE
OUTPATIENT
Start: 2023-12-28 | End: 2023-12-28

## 2023-12-28 RX ORDER — SODIUM CHLOR/HYPOCHLOROUS ACID 0.033 %
SOLUTION, IRRIGATION IRRIGATION ONCE
OUTPATIENT
Start: 2023-12-28 | End: 2023-12-28

## 2023-12-28 RX ORDER — LIDOCAINE HYDROCHLORIDE 40 MG/ML
SOLUTION TOPICAL ONCE
OUTPATIENT
Start: 2023-12-28 | End: 2023-12-28

## 2023-12-28 RX ORDER — LIDOCAINE HYDROCHLORIDE 40 MG/ML
SOLUTION TOPICAL ONCE
Status: COMPLETED | OUTPATIENT
Start: 2023-12-28 | End: 2023-12-28

## 2023-12-28 RX ADMIN — LIDOCAINE HYDROCHLORIDE: 40 SOLUTION TOPICAL at 08:17

## 2023-12-28 NOTE — PLAN OF CARE
Description not for Pressure Injury Full thickness 12/21/23 0836   Number of days: 520       Right Leg Measurements: (ALL measurements are in cm)  Right Leg Edema Point of Measurement  Leg circumference: 51 cm  Ankle circumference: 33 cm  Foot circumference: 29.5 cm  Length from heel to below knee: 38cm  Compression Therapy: Compression ordered, 4 layer compression wrap    Left Leg Measurements: (ALL measurements are in cm)  Left Leg Edema Point of Measurement  Heel to calf: 38  Leg circumference: 58 cm  Ankle circumference: 32 cm  Foot circumference: 28 cm  Length from heel to below knee: 38cm  Compression Therapy: Compression ordered, Compression stockings    Supplies Requested :     Medicare Requirements  Patient must have a qualifying Active Venus Ulcer if ordering Bilateral Compression Wounds MUST be present on both legs for Medicare Coverage. The patient can Not be on home health or have had a Medicare part A stay in the past 24 hours.     Patient Wound(s) Debrided: [x] Yes if yes please add date 12/28/2023   [] No    Debribement Type: Excisional/Sharp    Patient currently being seen by Home Health: [] Yes   [x] No     Compression Type: Medi Plus Compression stockings, 30-40 mm/Hg, Closed toe knee-high,BILATERAL lower legs     Provider Information:      PROVIDER'S NAME/NPI: Jaime Dumont MD,  NPI: [id number]

## 2023-12-28 NOTE — PROGRESS NOTES
Multilayer Compression Wrap   (Not Unna) Below the Knee    NAME:  Bassem Dorsey  YOB: 1965  MEDICAL RECORD NUMBER:  [id number]  DATE:  12/28/2023       Removed old Multilayer wrap if present and washed leg with mild soap/water. Applied moisturizing agent to dry skin as needed. Applied primary and secondary dressing as ordered    Applied multilayered dressing below the knee to Right lower leg(s)  (4 Layer Compression Wrap ) . Instructed patient/caregiver not to remove dressing and to keep it clean and dry. Instructed patient/caregiver on complications to report to provider, such as pain, numbness in toes, heavy drainage, and slippage of dressing. Instructed patient on purpose of compression dressing and on activity and exercise recommendations.    Applied per   Guidelines    Electronically signed by aNomi De Paz RN on 12/28/2023 at 8:46 AM
Call your doctor now or seek immediate medical care if:    You have symptoms of infection, such as: Increased pain, swelling, warmth, or redness. Red streaks leading from the area. Pus draining from the area. A fever.          [] Patient unable to sign Discharge Instructions given to ECF/Transportation/POA    Electronically signed by Sunil Arredondo MD on 12/28/2023 at 9:04 AM

## 2023-12-28 NOTE — PATIENT INSTRUCTIONS
411 [facility] Physician Orders and Discharge Instructions  1800 [facility] [place]   [address] [facility]. [place] [address]  Telephone: [phone]  NAME:  Jerrica Johnson  YOB: 1965  MEDICAL RECORD NUMBER:  [id number]  DATE: 12/28/23     Return Appointment:  Return Appointment: With Libia Boyce MD  in  1 [facility])  [] Return Appointment for a Wound Assessment with the nurse on:     Future Appointments   Date Time Provider 4600  46[facility]   1/17/2024  1:30 PM 4900 [facility] [place]: none    Medically necessary services for evaluation and treatment: []Skilled Nursing (using clean technique) []PT (Eval & Treat) []OT (Eval & Treat) []Social Work []Dietician []Other:      Wound care instructions: If you smoke we ask that you refrain from smoking. Smoking inhibits wounds from healing. When taking antibiotics take the entire prescription as ordered. Do not stop taking until medication is all gone unless otherwise instructed. Exercise as tolerated. Keep weight off wounds and reposition every 2 hours if applicable. Do not get wounds wet in the bath or shower unless otherwise instructed by your physician. If your wound is on your foot or leg, you may purchase a cast bag. Please ask at the pharmacy. Wash hands with soap and water prior to and after every dressing change. [x]Wash wounds with: Vashe wash - Apply enough Vashe to soak a piece of gauze and place on wound bed for 5-10 minutes. No need to rinse after soaking. [x]Kiah wound Topical Treatments: Do not apply lotions, creams, or ointments to the skin around the wound bed unless directed as followed:   Apply around the wound: Zinc paste         [x]Wound Location: right lower leg wounds   Apply Primary Dressing to wound: Foam with silver (I.e. Polymem Ag)  Secondary Dressing: Extra absorbant pad   Avoid contact of tape with skin if possible.   When to

## 2024-01-04 ENCOUNTER — HOSPITAL ENCOUNTER (OUTPATIENT)
Dept: WOUND CARE | Age: 59
Discharge: HOME | End: 2024-01-04
Attending: SPECIALIST
Payer: COMMERCIAL

## 2024-01-04 VITALS
DIASTOLIC BLOOD PRESSURE: 78 MMHG | TEMPERATURE: 97 F | HEART RATE: 66 BPM | RESPIRATION RATE: 16 BRPM | SYSTOLIC BLOOD PRESSURE: 132 MMHG

## 2024-01-04 DIAGNOSIS — I89.0 CHRONIC ACQUIRED LYMPHEDEMA: Primary | ICD-10-CM

## 2024-01-04 DIAGNOSIS — I83.009 VENOUS ULCER (HCC): ICD-10-CM

## 2024-01-04 DIAGNOSIS — Z91.199 NONCOMPLIANCE: ICD-10-CM

## 2024-01-04 DIAGNOSIS — L97.919 IDIOPATHIC CHRONIC VENOUS HYPERTENSION OF RIGHT LOWER EXTREMITY WITH ULCER (HCC): ICD-10-CM

## 2024-01-04 DIAGNOSIS — L97.909 VENOUS ULCER (HCC): ICD-10-CM

## 2024-01-04 DIAGNOSIS — I89.0 LYMPHEDEMA: ICD-10-CM

## 2024-01-04 DIAGNOSIS — I87.311 IDIOPATHIC CHRONIC VENOUS HYPERTENSION OF RIGHT LOWER EXTREMITY WITH ULCER (HCC): ICD-10-CM

## 2024-01-04 PROCEDURE — 29581 APPL MULTLAYER CMPRN SYS LEG: CPT

## 2024-01-04 PROCEDURE — 11042 DBRDMT SUBQ TIS 1ST 20SQCM/<: CPT | Performed by: SPECIALIST

## 2024-01-04 PROCEDURE — 6370000000 HC RX 637 (ALT 250 FOR IP): Performed by: SPECIALIST

## 2024-01-04 PROCEDURE — 11042 DBRDMT SUBQ TIS 1ST 20SQCM/<: CPT

## 2024-01-04 RX ORDER — IBUPROFEN 200 MG
TABLET ORAL ONCE
OUTPATIENT
Start: 2024-01-04 | End: 2024-01-04

## 2024-01-04 RX ORDER — LIDOCAINE 40 MG/G
CREAM TOPICAL ONCE
OUTPATIENT
Start: 2024-01-04 | End: 2024-01-04

## 2024-01-04 RX ORDER — LIDOCAINE HYDROCHLORIDE 40 MG/ML
SOLUTION TOPICAL ONCE
Status: COMPLETED | OUTPATIENT
Start: 2024-01-04 | End: 2024-01-04

## 2024-01-04 RX ORDER — SODIUM CHLOR/HYPOCHLOROUS ACID 0.033 %
SOLUTION, IRRIGATION IRRIGATION ONCE
OUTPATIENT
Start: 2024-01-04 | End: 2024-01-04

## 2024-01-04 RX ORDER — CLOBETASOL PROPIONATE 0.5 MG/G
OINTMENT TOPICAL ONCE
OUTPATIENT
Start: 2024-01-04 | End: 2024-01-04

## 2024-01-04 RX ORDER — GINSENG 100 MG
CAPSULE ORAL ONCE
OUTPATIENT
Start: 2024-01-04 | End: 2024-01-04

## 2024-01-04 RX ORDER — GENTAMICIN SULFATE 1 MG/G
OINTMENT TOPICAL ONCE
OUTPATIENT
Start: 2024-01-04 | End: 2024-01-04

## 2024-01-04 RX ORDER — BACITRACIN ZINC AND POLYMYXIN B SULFATE 500; 1000 [USP'U]/G; [USP'U]/G
OINTMENT TOPICAL ONCE
OUTPATIENT
Start: 2024-01-04 | End: 2024-01-04

## 2024-01-04 RX ORDER — BETAMETHASONE DIPROPIONATE 0.05 %
OINTMENT (GRAM) TOPICAL ONCE
OUTPATIENT
Start: 2024-01-04 | End: 2024-01-04

## 2024-01-04 RX ORDER — CIPROFLOXACIN 500 MG/1
500 TABLET, FILM COATED ORAL 2 TIMES DAILY
Qty: 20 TABLET | Refills: 0 | Status: SHIPPED | OUTPATIENT
Start: 2024-01-04 | End: 2024-01-14

## 2024-01-04 RX ORDER — LIDOCAINE 50 MG/G
OINTMENT TOPICAL ONCE
OUTPATIENT
Start: 2024-01-04 | End: 2024-01-04

## 2024-01-04 RX ORDER — TRIAMCINOLONE ACETONIDE 1 MG/G
OINTMENT TOPICAL ONCE
OUTPATIENT
Start: 2024-01-04 | End: 2024-01-04

## 2024-01-04 RX ORDER — LIDOCAINE HYDROCHLORIDE 20 MG/ML
JELLY TOPICAL ONCE
OUTPATIENT
Start: 2024-01-04 | End: 2024-01-04

## 2024-01-04 RX ORDER — LIDOCAINE HYDROCHLORIDE 40 MG/ML
SOLUTION TOPICAL ONCE
OUTPATIENT
Start: 2024-01-04 | End: 2024-01-04

## 2024-01-04 RX ADMIN — LIDOCAINE HYDROCHLORIDE: 40 SOLUTION TOPICAL at 08:13

## 2024-01-04 ASSESSMENT — PAIN DESCRIPTION - LOCATION: LOCATION: LEG

## 2024-01-04 ASSESSMENT — PAIN SCALES - GENERAL: PAINLEVEL_OUTOF10: 3

## 2024-01-04 ASSESSMENT — PAIN DESCRIPTION - DESCRIPTORS: DESCRIPTORS: BURNING

## 2024-01-04 ASSESSMENT — PAIN DESCRIPTION - ORIENTATION: ORIENTATION: RIGHT

## 2024-01-04 ASSESSMENT — PAIN DESCRIPTION - PAIN TYPE: TYPE: CHRONIC PAIN

## 2024-01-04 NOTE — PROGRESS NOTES
[facility]  Progress Note and Procedure Note      Justin Baeza  MEDICAL RECORD NUMBER:  [id number]  AGE: 58 y.o.   GENDER: male  : 1965  EPISODE DATE:  2024    Subjective:     Chief Complaint   Patient presents with    Wound Check     Right lower leg           HISTORY of PRESENT ILLNESS HPI     Justin Baeza is a 58 y.o. male who presents today for wound/ulcer evaluation.   History of Wound Context: Patient continues follow-up for venous insufficiency and ulceration with lymphedema.  He is 23weeks post application of Kerecis omega-3 fish skin.  He describes increased drainage this past week which is now foul-smelling.  Remains on his feet for protracted periods of time associated with his jobs and minimal utilization of lymphatic pumps  Wound/Ulcer Pain Timing/Severity: none  Quality of pain: N/A  Severity:  0 / 10   Modifying Factors: None  Associated Signs/Symptoms: edema, drainage, and odor    Ulcer Identification:  Ulcer Type: venous    Contributing Factors: edema, venous stasis, lymphedema, obesity, and anticoagulation therapy    Acute Wound: N/A not an acute wound    PAST MEDICAL HISTORY        Diagnosis Date    DVT (deep venous thrombosis) (HCC)     Hx of blood clots     Pain and swelling of right lower leg        PAST SURGICAL HISTORY    Past Surgical History:   Procedure Laterality Date    BALLOON ANGIOPLASTY, ARTERY Right 2017    at [facility]    DILATATION, ESOPHAGUS      SKIN SPLIT GRAFT Right        FAMILY HISTORY    Family History   Problem Relation Age of Onset    Diabetes Mother     Heart Attack Father        SOCIAL HISTORY    Social History     Tobacco Use    Smoking status: Never    Smokeless tobacco: Never   Vaping Use    Vaping Use: Never used   Substance Use Topics    Alcohol use: No    Drug use: No       ALLERGIES    No Known Allergies    MEDICATIONS    Current Outpatient Medications on File Prior to Encounter   Medication Sig Dispense Refill

## 2024-01-04 NOTE — PATIENT INSTRUCTIONS
Wound Care Center Physician Orders and Discharge Instructions  [facility]   [address] [place]  Telephone: [phone] FAX [phone]  NAME:  Justin Baeza  YOB: 1965  MEDICAL RECORD NUMBER:  [id number]  DATE: 1/4/24     Return Appointment:  Return Appointment: With Khang Giles MD  in  1 Week(s)  [x] Return Appointment for a Wound Assessment with the nurse on: 01/08/23    Future Appointments   Date Time Provider Department Center   1/17/2024  1:30 PM [facility] MEDICATION MANAGEMENT [facility]           Home Care Company: none    Medically necessary services for evaluation and treatment: []Skilled Nursing (using clean technique) []PT (Eval & Treat) []OT (Eval & Treat) []Social Work []Dietician []Other:      Wound care instructions:  If you smoke we ask that you refrain from smoking. Smoking inhibits wounds from healing.  When taking antibiotics take the entire prescription as ordered. Do not stop taking until medication is all gone unless otherwise instructed.   Exercise as tolerated.   Keep weight off wounds and reposition every 2 hours if applicable.  Do not get wounds wet in the bath or shower unless otherwise instructed by your physician. If your wound is on your foot or leg, you may purchase a cast bag. Please ask at the pharmacy.  Wash hands with soap and water prior to and after every dressing change.    [x]Wash wounds with: Vashe wash - Apply enough Vashe to soak a piece of gauze and place on wound bed for 5-10 minutes. No need to rinse after soaking.  [x]Kiah wound Topical Treatments: Do not apply lotions, creams, or ointments to the skin around the wound bed unless directed as followed:   Apply around the wound: Zinc paste         [x]Wound Location: right lower leg   Apply Primary Dressing to wound: Foam with silver (I.e. Polymem Ag)  Secondary Dressing: Extra absorbant pad   Avoid contact of tape with skin if possible.  When to

## 2024-01-04 NOTE — PROGRESS NOTES
Multilayer Compression Wrap   (Not Unna) Below the Knee    NAME:  Justin Baeza  YOB: 1965  MEDICAL RECORD NUMBER:  [id number]  DATE:  1/4/2024       Removed old Multilayer wrap if present and washed leg with mild soap/water.   Applied moisturizing agent to dry skin as needed.    Applied primary and secondary dressing as ordered    Applied multilayered dressing below the knee to right lower leg(s)  (4 Layer Compression Wrap ) .    Instructed patient/caregiver not to remove dressing and to keep it clean and dry.    Instructed patient/caregiver on complications to report to provider, such as pain, numbness in toes, heavy drainage, and slippage of dressing.    Instructed patient on purpose of compression dressing and on activity and exercise recommendations.   Applied per   Guidelines    Electronically signed by Mera Coelho RN on 1/4/2024 at 8:38 AM

## 2024-01-05 DIAGNOSIS — I82.423 THROMBOSIS OF BOTH ILIAC VEINS (HCC): ICD-10-CM

## 2024-01-05 DIAGNOSIS — I82.220 THROMBOSIS OF INFERIOR VENA CAVA (HCC): ICD-10-CM

## 2024-01-05 DIAGNOSIS — Z79.01 CHRONIC ANTICOAGULATION: ICD-10-CM

## 2024-01-05 DIAGNOSIS — I82.5Z1 CHRONIC VENOUS EMBOLISM AND THROMBOSIS OF DEEP VESSELS OF DISTAL END OF RIGHT LOWER EXTREMITY (HCC): ICD-10-CM

## 2024-01-08 ENCOUNTER — HOSPITAL ENCOUNTER (OUTPATIENT)
Dept: WOUND CARE | Age: 59
Discharge: HOME | End: 2024-01-08
Attending: SPECIALIST
Payer: COMMERCIAL

## 2024-01-08 VITALS
HEART RATE: 73 BPM | DIASTOLIC BLOOD PRESSURE: 76 MMHG | SYSTOLIC BLOOD PRESSURE: 121 MMHG | TEMPERATURE: 97 F | RESPIRATION RATE: 16 BRPM

## 2024-01-08 DIAGNOSIS — I87.311 IDIOPATHIC CHRONIC VENOUS HYPERTENSION OF RIGHT LOWER EXTREMITY WITH ULCER (HCC): ICD-10-CM

## 2024-01-08 DIAGNOSIS — I83.009 VENOUS ULCER (HCC): ICD-10-CM

## 2024-01-08 DIAGNOSIS — I89.0 CHRONIC ACQUIRED LYMPHEDEMA: Primary | ICD-10-CM

## 2024-01-08 DIAGNOSIS — L97.909 VENOUS ULCER (HCC): ICD-10-CM

## 2024-01-08 DIAGNOSIS — Z91.199 NONCOMPLIANCE: ICD-10-CM

## 2024-01-08 DIAGNOSIS — L97.919 IDIOPATHIC CHRONIC VENOUS HYPERTENSION OF RIGHT LOWER EXTREMITY WITH ULCER (HCC): ICD-10-CM

## 2024-01-08 DIAGNOSIS — I89.0 LYMPHEDEMA: ICD-10-CM

## 2024-01-08 PROCEDURE — 29581 APPL MULTLAYER CMPRN SYS LEG: CPT

## 2024-01-08 RX ORDER — LIDOCAINE HYDROCHLORIDE 40 MG/ML
SOLUTION TOPICAL ONCE
Status: DISCONTINUED | OUTPATIENT
Start: 2024-01-08 | End: 2024-01-09 | Stop reason: HOSPADM

## 2024-01-08 RX ORDER — SODIUM CHLOR/HYPOCHLOROUS ACID 0.033 %
SOLUTION, IRRIGATION IRRIGATION ONCE
OUTPATIENT
Start: 2024-01-08 | End: 2024-01-08

## 2024-01-08 RX ORDER — BACITRACIN ZINC AND POLYMYXIN B SULFATE 500; 1000 [USP'U]/G; [USP'U]/G
OINTMENT TOPICAL ONCE
OUTPATIENT
Start: 2024-01-08 | End: 2024-01-08

## 2024-01-08 RX ORDER — LIDOCAINE 40 MG/G
CREAM TOPICAL ONCE
OUTPATIENT
Start: 2024-01-08 | End: 2024-01-08

## 2024-01-08 RX ORDER — BETAMETHASONE DIPROPIONATE 0.05 %
OINTMENT (GRAM) TOPICAL ONCE
OUTPATIENT
Start: 2024-01-08 | End: 2024-01-08

## 2024-01-08 RX ORDER — LIDOCAINE HYDROCHLORIDE 20 MG/ML
JELLY TOPICAL ONCE
OUTPATIENT
Start: 2024-01-08 | End: 2024-01-08

## 2024-01-08 RX ORDER — CLOBETASOL PROPIONATE 0.5 MG/G
OINTMENT TOPICAL ONCE
OUTPATIENT
Start: 2024-01-08 | End: 2024-01-08

## 2024-01-08 RX ORDER — IBUPROFEN 200 MG
TABLET ORAL ONCE
OUTPATIENT
Start: 2024-01-08 | End: 2024-01-08

## 2024-01-08 RX ORDER — GENTAMICIN SULFATE 1 MG/G
OINTMENT TOPICAL ONCE
OUTPATIENT
Start: 2024-01-08 | End: 2024-01-08

## 2024-01-08 RX ORDER — LIDOCAINE HYDROCHLORIDE 40 MG/ML
SOLUTION TOPICAL ONCE
OUTPATIENT
Start: 2024-01-08 | End: 2024-01-08

## 2024-01-08 RX ORDER — LIDOCAINE 50 MG/G
OINTMENT TOPICAL ONCE
OUTPATIENT
Start: 2024-01-08 | End: 2024-01-08

## 2024-01-08 RX ORDER — GINSENG 100 MG
CAPSULE ORAL ONCE
OUTPATIENT
Start: 2024-01-08 | End: 2024-01-08

## 2024-01-08 RX ORDER — TRIAMCINOLONE ACETONIDE 1 MG/G
OINTMENT TOPICAL ONCE
OUTPATIENT
Start: 2024-01-08 | End: 2024-01-08

## 2024-01-08 ASSESSMENT — PAIN SCALES - GENERAL: PAINLEVEL_OUTOF10: 2

## 2024-01-08 NOTE — PROGRESS NOTES
Multilayer Compression Wrap   (Not Unna) Below the Knee    NAME:  Justin Baeza  YOB: 1965  MEDICAL RECORD NUMBER:  [id number]  DATE:  1/8/2024       Removed old Multilayer wrap if present and washed leg with mild soap/water.   Applied moisturizing agent to dry skin as needed.    Applied primary and secondary dressing as ordered    Applied multilayered dressing below the knee to Right lower leg(s)  (4 Layer Compression Wrap ) .    Instructed patient/caregiver not to remove dressing and to keep it clean and dry.    Instructed patient/caregiver on complications to report to provider, such as pain, numbness in toes, heavy drainage, and slippage of dressing.    Instructed patient on purpose of compression dressing and on activity and exercise recommendations.   Applied per   Guidelines    Electronically signed by Kathy Gomez RN on 1/8/2024 at 8:43 AM

## 2024-01-08 NOTE — PATIENT INSTRUCTIONS
Wound Care Center Physician Orders and Discharge Instructions  [facility]  [address] [facility]. [address] [place] [address]  Telephone: [phone]      FAX [phone]    NAME:  Justin Baeza  YOB: 1965  MEDICAL RECORD NUMBER:  [id number]  DATE:  1/8/2024      Wound care:  Continue to follow the instructions and recommendations from your last doctor visit.  The dressing(s) applied is the same as your last visit. Please refer to your last discharge instruction for the information on your wound care.      If there were any changes made, please follow the instructions as written here: none    Future Appointments     Future Appointments   Date Time Provider Department Center   1/11/2024  8:00 AM Khang Giles MD [facility] WOUND [facility]   1/17/2024  1:30 PM [facility] MEDICATION MANAGEMENT [facility]           Your nurse  is:  talisha     Electronically signed by Kathy Gomez RN on 1/8/2024 at 8:43 AM     Wound Care Center Information: Should you experience any significant changes in your wound(s) or have questions about your wound care, please contact the [facility] at [phone]. Our hours vary so please leave a message. Please give us 24-48 hours to return your call.   If you need help with your wounds and cannot wait until we are available, contact your PCP or go to the hospital emergency room.       Physician orders by:  Dr. Giles        The information contained in the After Visit Summary has been reviewed with me, the patient and/or responsible adult, by my health care provider(s).  I had the opportunity to ask questions regarding this information.  I have elected to receive;      [] Patient unable to sign Discharge Instructions. Given to ECF/Transportation/POA

## 2024-01-09 RX ORDER — WARFARIN SODIUM 5 MG/1
TABLET ORAL
Qty: 44 TABLET | Refills: 3 | Status: SHIPPED | OUTPATIENT
Start: 2024-01-09

## 2024-01-11 ENCOUNTER — HOSPITAL ENCOUNTER (OUTPATIENT)
Dept: WOUND CARE | Age: 59
Discharge: HOME | End: 2024-01-11
Attending: SPECIALIST
Payer: COMMERCIAL

## 2024-01-11 VITALS
DIASTOLIC BLOOD PRESSURE: 84 MMHG | HEART RATE: 75 BPM | SYSTOLIC BLOOD PRESSURE: 146 MMHG | RESPIRATION RATE: 16 BRPM | TEMPERATURE: 97.1 F

## 2024-01-11 PROBLEM — I87.331 IDIOPATHIC CHRONIC VENOUS HYPERTENSION OF RIGHT LOWER EXTREMITY WITH ULCER AND INFLAMMATION (HCC): Status: ACTIVE | Noted: 2017-03-06

## 2024-01-11 PROCEDURE — 29581 APPL MULTLAYER CMPRN SYS LEG: CPT

## 2024-01-11 PROCEDURE — 11042 DBRDMT SUBQ TIS 1ST 20SQCM/<: CPT

## 2024-01-11 ASSESSMENT — PAIN DESCRIPTION - LOCATION: LOCATION: LEG

## 2024-01-11 ASSESSMENT — PAIN DESCRIPTION - PAIN TYPE: TYPE: CHRONIC PAIN

## 2024-01-11 ASSESSMENT — PAIN DESCRIPTION - DESCRIPTORS: DESCRIPTORS: BURNING

## 2024-01-11 ASSESSMENT — PAIN DESCRIPTION - FREQUENCY: FREQUENCY: CONTINUOUS

## 2024-01-11 ASSESSMENT — PAIN SCALES - GENERAL: PAINLEVEL_OUTOF10: 3

## 2024-01-11 ASSESSMENT — PAIN DESCRIPTION - ORIENTATION: ORIENTATION: RIGHT

## 2024-01-11 NOTE — PROGRESS NOTES
[facility]  Progress Note and Procedure Note      Justin Baeza  MEDICAL RECORD NUMBER:  [id number]  AGE: 58 y.o.   GENDER: male  : 1965  EPISODE DATE:  2024    Subjective:     Chief Complaint   Patient presents with    Wound Check     Right lower leg         HISTORY of PRESENT ILLNESS HPI     Justin Baeza is a 58 y.o. male who presents today for wound/ulcer evaluation.   History of Wound Context: Patient continues follow-up for venous insufficiency and ulceration with lymphedema.  He is 23weeks post application of Kerecis omega-3 fish skin.  He had described increased drainage this past week which is now foul-smelling.  Remains on his feet for protracted periods of time associated with his jobs and minimal utilization of lymphatic pumps .  Markedly improved drainage and some foul-smelling odor with the initiation of Cipro antibiotics this past week.  Wound/Ulcer Pain Timing/Severity: none  Quality of pain: N/A  Severity:  0 / 10   Modifying Factors: None  Associated Signs/Symptoms: edema and drainage    Ulcer Identification:  Ulcer Type: venous    Contributing Factors: edema, venous stasis, lymphedema, obesity, and anticoagulation therapy    Acute Wound: N/A not an acute wound    PAST MEDICAL HISTORY        Diagnosis Date    DVT (deep venous thrombosis) (HCC)     Hx of blood clots     Pain and swelling of right lower leg        PAST SURGICAL HISTORY    Past Surgical History:   Procedure Laterality Date    BALLOON ANGIOPLASTY, ARTERY Right 2017    at [facility]    DILATATION, ESOPHAGUS      SKIN SPLIT GRAFT Right        FAMILY HISTORY    Family History   Problem Relation Age of Onset    Diabetes Mother     Heart Attack Father        SOCIAL HISTORY    Social History     Tobacco Use    Smoking status: Never    Smokeless tobacco: Never   Vaping Use    Vaping Use: Never used   Substance Use Topics    Alcohol use: No    Drug use: No       ALLERGIES    No Known

## 2024-01-11 NOTE — PROGRESS NOTES
Multilayer Compression Wrap   (Not Unna) Below the Knee    NAME:  Justin Baeza  YOB: 1965  MEDICAL RECORD NUMBER:  [id number]  DATE:  1/11/2024       Removed old Multilayer wrap if present and washed leg with mild soap/water.   Applied moisturizing agent to dry skin as needed.    Applied primary and secondary dressing as ordered    Applied multilayered dressing below the knee to Right lower leg(s)  (4 Layer Compression Wrap ) .    Instructed patient/caregiver not to remove dressing and to keep it clean and dry.    Instructed patient/caregiver on complications to report to provider, such as pain, numbness in toes, heavy drainage, and slippage of dressing.    Instructed patient on purpose of compression dressing and on activity and exercise recommendations.   Applied per   Guidelines    Electronically signed by Edgar Nunez RN on 1/11/2024 at 9:13 AM

## 2024-01-11 NOTE — PATIENT INSTRUCTIONS
Wound Care Center Physician Orders and Discharge Instructions  [facility]   [address] [place]  Telephone: [phone] FAX [phone]  NAME:  Justin Baeza  YOB: 1965  MEDICAL RECORD NUMBER:  [id number]  DATE: 1/11/24     Return Appointment:  Return Appointment: With Khang Giles MD  in  1 Week(s)  [] Return Appointment for a Wound Assessment with the nurse on:     Future Appointments   Date Time Provider Department Center   1/17/2024  1:30 PM [facility] MEDICATION MANAGEMENT [facility] Care Company: none    Medically necessary services for evaluation and treatment: []Skilled Nursing (using clean technique) []PT (Eval & Treat) []OT (Eval & Treat) []Social Work []Dietician []Other:      Wound care instructions:  If you smoke we ask that you refrain from smoking. Smoking inhibits wounds from healing.  When taking antibiotics take the entire prescription as ordered. Do not stop taking until medication is all gone unless otherwise instructed.   Exercise as tolerated.   Keep weight off wounds and reposition every 2 hours if applicable.  Do not get wounds wet in the bath or shower unless otherwise instructed by your physician. If your wound is on your foot or leg, you may purchase a cast bag. Please ask at the pharmacy.  Wash hands with soap and water prior to and after every dressing change.    [x]Wash wounds with: Vashe wash - Apply enough Vashe to soak a piece of gauze and place on wound bed for 5-10 minutes. No need to rinse after soaking.  [x]Kiah wound Topical Treatments: Do not apply lotions, creams, or ointments to the skin around the wound bed unless directed as followed:   Apply around the wound: Moisturizing lotion         [x]Wound Location: right lower leg   Apply Primary Dressing to wound: Foam with silver (I.e. Polymem Ag)  Secondary Dressing: 4X4 gauze pad   Avoid contact of tape with skin if possible.  When to change

## 2024-01-17 ENCOUNTER — ANTI-COAG VISIT (OUTPATIENT)
Dept: PHARMACY | Age: 59
End: 2024-01-17
Payer: COMMERCIAL

## 2024-01-17 DIAGNOSIS — I82.5Z1 CHRONIC VENOUS EMBOLISM AND THROMBOSIS OF DEEP VESSELS OF DISTAL END OF RIGHT LOWER EXTREMITY (HCC): Primary | ICD-10-CM

## 2024-01-17 LAB — INTERNATIONAL NORMALIZATION RATIO, POC: 3.4

## 2024-01-17 PROCEDURE — 85610 PROTHROMBIN TIME: CPT

## 2024-01-17 PROCEDURE — 99211 OFF/OP EST MAY X REQ PHY/QHP: CPT

## 2024-01-17 NOTE — PROGRESS NOTES
reported   Recent medication changes 1/4/24: Cipro x 10 days  8/2-8/30/23: Zosyn    Medications taken regularly that may interact with warfarin or alter INR None reported   Warfarin dose taken as prescribed Yes - does not use pillbox (only takes warfarin)   Signs/symptoms of bleeding No h/o bleeding reported   Vitamin K intake Normally avoids high vitamin K foods: ~0-1 serving per week   Recent vomiting/diarrhea/fever, changes in weight or activity level None reported   Tobacco or alcohol use Patient denies drinking or smoking    Upcoming surgeries or procedures None reported     Assessment/Plan:  Patient's INR was supratherapeutic today (3.4) likely due to antibiotic course that he recently finished. Patient denies any missed/extra warfarin doses, diet changes, illness, bleeding, etc since last visit.     Patient was instructed to continue warfarin 7.5 mg daily except 5 mg on Monday and Friday. Repeat INR in 8 weeks. Patient prefers to extend interval between appointments. Patient was reminded to maintain consistent vitamin K intake and call with any bleeding, medication changes, or fever/vomiting/diarrhea.    Patient understands dosing directions and information discussed.  Dosing schedule and follow up appointment given to patient.  Progress note routed to referring physician's office.  Patient acknowledges working in consult agreement with pharmacist as referred by his/her physician.     Next Clinic Appointment: 3/13    Please call [facility] at [phone] with any questions.      Brook Hatfield, PharmD  PGY1 Resident   Ext. [phone]  1/17/2024 1:45 PM    For Pharmacy Admin Tracking Only  Time Spent (min): 15

## 2024-01-18 ENCOUNTER — HOSPITAL ENCOUNTER (OUTPATIENT)
Dept: WOUND CARE | Age: 59
Discharge: HOME | End: 2024-01-18
Attending: SPECIALIST
Payer: COMMERCIAL

## 2024-01-18 VITALS
SYSTOLIC BLOOD PRESSURE: 138 MMHG | RESPIRATION RATE: 16 BRPM | HEART RATE: 76 BPM | DIASTOLIC BLOOD PRESSURE: 82 MMHG | TEMPERATURE: 96.7 F

## 2024-01-18 DIAGNOSIS — I87.331 IDIOPATHIC CHRONIC VENOUS HYPERTENSION OF RIGHT LOWER EXTREMITY WITH ULCER AND INFLAMMATION (HCC): ICD-10-CM

## 2024-01-18 DIAGNOSIS — I83.009 VENOUS ULCER (HCC): ICD-10-CM

## 2024-01-18 DIAGNOSIS — Z91.199 NONCOMPLIANCE: ICD-10-CM

## 2024-01-18 DIAGNOSIS — I89.0 CHRONIC ACQUIRED LYMPHEDEMA: Primary | ICD-10-CM

## 2024-01-18 DIAGNOSIS — I89.0 LYMPHEDEMA: ICD-10-CM

## 2024-01-18 DIAGNOSIS — L97.909 VENOUS ULCER (HCC): ICD-10-CM

## 2024-01-18 PROCEDURE — 6370000000 HC RX 637 (ALT 250 FOR IP): Performed by: SPECIALIST

## 2024-01-18 PROCEDURE — 29581 APPL MULTLAYER CMPRN SYS LEG: CPT

## 2024-01-18 PROCEDURE — 11042 DBRDMT SUBQ TIS 1ST 20SQCM/<: CPT

## 2024-01-18 PROCEDURE — 11042 DBRDMT SUBQ TIS 1ST 20SQCM/<: CPT | Performed by: SPECIALIST

## 2024-01-18 RX ORDER — LIDOCAINE HYDROCHLORIDE 20 MG/ML
JELLY TOPICAL ONCE
OUTPATIENT
Start: 2024-01-18 | End: 2024-01-18

## 2024-01-18 RX ORDER — IBUPROFEN 200 MG
TABLET ORAL ONCE
OUTPATIENT
Start: 2024-01-18 | End: 2024-01-18

## 2024-01-18 RX ORDER — GINSENG 100 MG
CAPSULE ORAL ONCE
OUTPATIENT
Start: 2024-01-18 | End: 2024-01-18

## 2024-01-18 RX ORDER — CIPROFLOXACIN 500 MG/1
500 TABLET, FILM COATED ORAL 2 TIMES DAILY
Qty: 20 TABLET | Refills: 0 | Status: SHIPPED | OUTPATIENT
Start: 2024-01-18 | End: 2024-01-28

## 2024-01-18 RX ORDER — SODIUM CHLOR/HYPOCHLOROUS ACID 0.033 %
SOLUTION, IRRIGATION IRRIGATION ONCE
OUTPATIENT
Start: 2024-01-18 | End: 2024-01-18

## 2024-01-18 RX ORDER — TRIAMCINOLONE ACETONIDE 1 MG/G
OINTMENT TOPICAL ONCE
OUTPATIENT
Start: 2024-01-18 | End: 2024-01-18

## 2024-01-18 RX ORDER — GENTAMICIN SULFATE 1 MG/G
OINTMENT TOPICAL ONCE
OUTPATIENT
Start: 2024-01-18 | End: 2024-01-18

## 2024-01-18 RX ORDER — CLOBETASOL PROPIONATE 0.5 MG/G
OINTMENT TOPICAL ONCE
OUTPATIENT
Start: 2024-01-18 | End: 2024-01-18

## 2024-01-18 RX ORDER — LIDOCAINE HYDROCHLORIDE 40 MG/ML
SOLUTION TOPICAL ONCE
Status: COMPLETED | OUTPATIENT
Start: 2024-01-18 | End: 2024-01-18

## 2024-01-18 RX ORDER — LIDOCAINE HYDROCHLORIDE 40 MG/ML
SOLUTION TOPICAL ONCE
OUTPATIENT
Start: 2024-01-18 | End: 2024-01-18

## 2024-01-18 RX ORDER — BACITRACIN ZINC AND POLYMYXIN B SULFATE 500; 1000 [USP'U]/G; [USP'U]/G
OINTMENT TOPICAL ONCE
OUTPATIENT
Start: 2024-01-18 | End: 2024-01-18

## 2024-01-18 RX ORDER — LIDOCAINE 50 MG/G
OINTMENT TOPICAL ONCE
OUTPATIENT
Start: 2024-01-18 | End: 2024-01-18

## 2024-01-18 RX ORDER — LIDOCAINE 40 MG/G
CREAM TOPICAL ONCE
OUTPATIENT
Start: 2024-01-18 | End: 2024-01-18

## 2024-01-18 RX ORDER — BETAMETHASONE DIPROPIONATE 0.05 %
OINTMENT (GRAM) TOPICAL ONCE
OUTPATIENT
Start: 2024-01-18 | End: 2024-01-18

## 2024-01-18 RX ADMIN — LIDOCAINE HYDROCHLORIDE: 40 SOLUTION TOPICAL at 08:47

## 2024-01-18 ASSESSMENT — PAIN DESCRIPTION - ORIENTATION: ORIENTATION: RIGHT

## 2024-01-18 ASSESSMENT — PAIN SCALES - GENERAL: PAINLEVEL_OUTOF10: 8

## 2024-01-18 ASSESSMENT — PAIN DESCRIPTION - LOCATION: LOCATION: LEG

## 2024-01-18 ASSESSMENT — PAIN DESCRIPTION - DESCRIPTORS: DESCRIPTORS: BURNING

## 2024-01-18 NOTE — PROGRESS NOTES
[facility] Care Center  Progress Note and Procedure Note      Justin Baeza  MEDICAL RECORD NUMBER:  [id number]  AGE: 58 y.o.   GENDER: male  : 1965  EPISODE DATE:  2024    Subjective:     Chief Complaint   Patient presents with    Wound Check     Right lower leg           HISTORY of PRESENT ILLNESS HPI     Justin Baeza is a 58 y.o. male who presents today for wound/ulcer evaluation.   History of Wound Context: Patient continues follow-up for venous insufficiency and ulceration with lymphedema.  He is 3 weeks post application of Kerecis omega-3 fish skin.  He had described increased drainage this past week which is now foul-smelling.  Remains on his feet for protracted periods of time associated with his jobs and minimal utilization of lymphatic pumps .  Markedly improved drainage and some foul-smelling odor with the initiation of Cipro antibiotic .  Unfortunately his wrap slipped this past week resulting in increased drainage and pain  Wound/Ulcer Pain Timing/Severity: moderate  Quality of pain: sharp  Severity:  3 / 10   Modifying Factors: None  Associated Signs/Symptoms: edema and drainage    Ulcer Identification:  Ulcer Type: venous    Contributing Factors: edema, venous stasis, lymphedema, obesity, and anticoagulation therapy    Acute Wound: N/A not an acute wound    PAST MEDICAL HISTORY        Diagnosis Date    DVT (deep venous thrombosis) (HCC)     Hx of blood clots     Pain and swelling of right lower leg        PAST SURGICAL HISTORY    Past Surgical History:   Procedure Laterality Date    BALLOON ANGIOPLASTY, ARTERY Right 2017    at [facility]    DILATATION, ESOPHAGUS      SKIN SPLIT GRAFT Right        FAMILY HISTORY    Family History   Problem Relation Age of Onset    Diabetes Mother     Heart Attack Father        SOCIAL HISTORY    Social History     Tobacco Use    Smoking status: Never    Smokeless tobacco: Never   Vaping Use    Vaping Use: Never used   Substance Use

## 2024-01-18 NOTE — PATIENT INSTRUCTIONS
Wound Care Center Physician Orders and Discharge Instructions  [facility]   [address] [place]  Telephone: [phone] FAX [phone]  NAME:  Justin Baeza  YOB: 1965  MEDICAL RECORD NUMBER:  [id number]  DATE: 1/18/24     Return Appointment:  Return Appointment: With Khagn Giles MD  in  1 Week(s)  [] Return Appointment for a Wound Assessment with the nurse on:     Future Appointments   Date Time Provider Department Center   3/13/2024  1:30 PM [facility] MEDICATION MANAGEMENT [facility] Care Company: none    Medically necessary services for evaluation and treatment: []Skilled Nursing (using clean technique) []PT (Eval & Treat) []OT (Eval & Treat) []Social Work []Dietician []Other:      Wound care instructions:  If you smoke we ask that you refrain from smoking. Smoking inhibits wounds from healing.  When taking antibiotics take the entire prescription as ordered. Do not stop taking until medication is all gone unless otherwise instructed.   Exercise as tolerated.   Keep weight off wounds and reposition every 2 hours if applicable.  Do not get wounds wet in the bath or shower unless otherwise instructed by your physician. If your wound is on your foot or leg, you may purchase a cast bag. Please ask at the pharmacy.  Wash hands with soap and water prior to and after every dressing change.    [x]Wash wounds with: Vashe wash - Apply enough Vashe to soak a piece of gauze and place on wound bed for 5-10 minutes. No need to rinse after soaking.  [x]Kiah wound Topical Treatments: Do not apply lotions, creams, or ointments to the skin around the wound bed unless directed as followed:   Apply around the wound: Moisturizing lotion         [x]Wound Location: right lower leg wounds   Apply Primary Dressing to wound: Foam with silver (I.e. Polymem Ag)  Secondary Dressing: Extra absorbant pad   Avoid contact of tape with skin if possible.  When

## 2024-01-18 NOTE — PROGRESS NOTES
Multilayer Compression Wrap   (Not Unna) Below the Knee    NAME:  Justin Baeza  YOB: 1965  MEDICAL RECORD NUMBER:  [id number]  DATE:  1/18/2024       Removed old Multilayer wrap if present and washed leg with mild soap/water.   Applied moisturizing agent to dry skin as needed.    Applied primary and secondary dressing as ordered    Applied multilayered dressing below the knee to Right lower leg(s)  (4 Layer Compression Wrap ) .    Instructed patient/caregiver not to remove dressing and to keep it clean and dry.    Instructed patient/caregiver on complications to report to provider, such as pain, numbness in toes, heavy drainage, and slippage of dressing.    Instructed patient on purpose of compression dressing and on activity and exercise recommendations.   Applied per   Guidelines    Electronically signed by Yesenia Hawkins RN on 1/18/2024 at 9:10 AM

## 2024-01-24 ENCOUNTER — TELEPHONE (OUTPATIENT)
Dept: PHARMACY | Age: 59
End: 2024-01-24

## 2024-01-24 DIAGNOSIS — I82.5Z1 CHRONIC VENOUS EMBOLISM AND THROMBOSIS OF DEEP VESSELS OF DISTAL END OF RIGHT LOWER EXTREMITY (HCC): Primary | ICD-10-CM

## 2024-01-24 DIAGNOSIS — I82.220 THROMBOSIS OF INFERIOR VENA CAVA (HCC): ICD-10-CM

## 2024-01-24 DIAGNOSIS — Z79.01 CHRONIC ANTICOAGULATION: ICD-10-CM

## 2024-01-24 DIAGNOSIS — I82.423 THROMBOSIS OF BOTH ILIAC VEINS (HCC): ICD-10-CM

## 2024-01-24 DIAGNOSIS — I82.5Z9 CHRONIC VENOUS EMBOLISM AND THROMBOSIS OF DEEP VESSELS OF DISTAL LOWER EXTREMITY, UNSPECIFIED LATERALITY (HCC): ICD-10-CM

## 2024-01-24 RX ORDER — WARFARIN SODIUM 5 MG/1
TABLET ORAL
Qty: 135 TABLET | Refills: 3 | Status: SHIPPED | OUTPATIENT
Start: 2024-01-24

## 2024-01-24 NOTE — TELEPHONE ENCOUNTER
Warfarin Rx sent to [facility] pharmacy on [address] per patient request.     Yana Katz, PharmD, [facility]  [facility]  [place]: [phone]  [facility]: [phone]  1/24/2024 3:17 PM    For Pharmacy Admin Tracking Only    Intervention Detail: Refill(s) Provided  Total # of Interventions Recommended: 1  Total # of Interventions Accepted: 1  Time Spent (min): 10

## 2024-01-25 ENCOUNTER — HOSPITAL ENCOUNTER (OUTPATIENT)
Dept: WOUND CARE | Age: 59
Discharge: HOME | End: 2024-01-25
Attending: SPECIALIST
Payer: COMMERCIAL

## 2024-01-25 VITALS
RESPIRATION RATE: 18 BRPM | SYSTOLIC BLOOD PRESSURE: 133 MMHG | TEMPERATURE: 97.7 F | DIASTOLIC BLOOD PRESSURE: 94 MMHG | HEART RATE: 84 BPM

## 2024-01-25 DIAGNOSIS — I83.009 VENOUS ULCER (HCC): ICD-10-CM

## 2024-01-25 DIAGNOSIS — Z91.199 NONCOMPLIANCE: ICD-10-CM

## 2024-01-25 DIAGNOSIS — I89.0 LYMPHEDEMA: ICD-10-CM

## 2024-01-25 DIAGNOSIS — L97.909 VENOUS ULCER (HCC): ICD-10-CM

## 2024-01-25 DIAGNOSIS — I89.0 CHRONIC ACQUIRED LYMPHEDEMA: Primary | ICD-10-CM

## 2024-01-25 DIAGNOSIS — I87.331 IDIOPATHIC CHRONIC VENOUS HYPERTENSION OF RIGHT LOWER EXTREMITY WITH ULCER AND INFLAMMATION (HCC): ICD-10-CM

## 2024-01-25 PROCEDURE — 11045 DBRDMT SUBQ TISS EACH ADDL: CPT | Performed by: SPECIALIST

## 2024-01-25 PROCEDURE — 11045 DBRDMT SUBQ TISS EACH ADDL: CPT

## 2024-01-25 PROCEDURE — 11042 DBRDMT SUBQ TIS 1ST 20SQCM/<: CPT

## 2024-01-25 PROCEDURE — 6370000000 HC RX 637 (ALT 250 FOR IP): Performed by: SPECIALIST

## 2024-01-25 PROCEDURE — 29581 APPL MULTLAYER CMPRN SYS LEG: CPT

## 2024-01-25 PROCEDURE — 11042 DBRDMT SUBQ TIS 1ST 20SQCM/<: CPT | Performed by: SPECIALIST

## 2024-01-25 RX ORDER — BACITRACIN ZINC AND POLYMYXIN B SULFATE 500; 1000 [USP'U]/G; [USP'U]/G
OINTMENT TOPICAL ONCE
OUTPATIENT
Start: 2024-01-25 | End: 2024-01-25

## 2024-01-25 RX ORDER — LIDOCAINE 40 MG/G
CREAM TOPICAL ONCE
OUTPATIENT
Start: 2024-01-25 | End: 2024-01-25

## 2024-01-25 RX ORDER — TRIAMCINOLONE ACETONIDE 1 MG/G
OINTMENT TOPICAL ONCE
OUTPATIENT
Start: 2024-01-25 | End: 2024-01-25

## 2024-01-25 RX ORDER — LIDOCAINE HYDROCHLORIDE 40 MG/ML
SOLUTION TOPICAL ONCE
OUTPATIENT
Start: 2024-01-25 | End: 2024-01-25

## 2024-01-25 RX ORDER — IBUPROFEN 200 MG
TABLET ORAL ONCE
OUTPATIENT
Start: 2024-01-25 | End: 2024-01-25

## 2024-01-25 RX ORDER — LIDOCAINE HYDROCHLORIDE 40 MG/ML
SOLUTION TOPICAL ONCE
Status: COMPLETED | OUTPATIENT
Start: 2024-01-25 | End: 2024-01-25

## 2024-01-25 RX ORDER — BETAMETHASONE DIPROPIONATE 0.05 %
OINTMENT (GRAM) TOPICAL ONCE
OUTPATIENT
Start: 2024-01-25 | End: 2024-01-25

## 2024-01-25 RX ORDER — SODIUM CHLOR/HYPOCHLOROUS ACID 0.033 %
SOLUTION, IRRIGATION IRRIGATION ONCE
OUTPATIENT
Start: 2024-01-25 | End: 2024-01-25

## 2024-01-25 RX ORDER — GENTAMICIN SULFATE 1 MG/G
OINTMENT TOPICAL ONCE
OUTPATIENT
Start: 2024-01-25 | End: 2024-01-25

## 2024-01-25 RX ORDER — LIDOCAINE HYDROCHLORIDE 20 MG/ML
JELLY TOPICAL ONCE
OUTPATIENT
Start: 2024-01-25 | End: 2024-01-25

## 2024-01-25 RX ORDER — CLOBETASOL PROPIONATE 0.5 MG/G
OINTMENT TOPICAL ONCE
OUTPATIENT
Start: 2024-01-25 | End: 2024-01-25

## 2024-01-25 RX ORDER — LIDOCAINE 50 MG/G
OINTMENT TOPICAL ONCE
OUTPATIENT
Start: 2024-01-25 | End: 2024-01-25

## 2024-01-25 RX ORDER — GINSENG 100 MG
CAPSULE ORAL ONCE
OUTPATIENT
Start: 2024-01-25 | End: 2024-01-25

## 2024-01-25 RX ADMIN — LIDOCAINE HYDROCHLORIDE: 40 SOLUTION TOPICAL at 08:08

## 2024-01-25 ASSESSMENT — PAIN SCALES - GENERAL: PAINLEVEL_OUTOF10: 2

## 2024-01-25 ASSESSMENT — PAIN DESCRIPTION - ORIENTATION: ORIENTATION: RIGHT

## 2024-01-25 ASSESSMENT — PAIN DESCRIPTION - LOCATION: LOCATION: LEG

## 2024-01-25 NOTE — PATIENT INSTRUCTIONS
Wound Care Center Physician Orders and Discharge Instructions  [facility]   [address]  Telephone: [phone] FAX [phone]  NAME:  Justin Baeza  YOB: 1965  MEDICAL RECORD NUMBER:  [id number]  DATE: 1/25/24     Return Appointment:  Return Appointment: With Khang Giles MD  in  1 Week(s)  [] Return Appointment for a Wound Assessment with the nurse on:     Future Appointments   Date Time Provider Department Center   3/13/2024  1:30 PM [facility] MEDICATION MANAGEMENT [facility]         Orders Placed This Encounter   Procedures    Initiate Outpatient Wound Care Protocol       Home Care Company: none    Medically necessary services for evaluation and treatment: []Skilled Nursing (using clean technique) []PT (Eval & Treat) []OT (Eval & Treat) []Social Work []Dietician []Other:      Wound care instructions:  If you smoke we ask that you refrain from smoking. Smoking inhibits wounds from healing.  When taking antibiotics take the entire prescription as ordered. Do not stop taking until medication is all gone unless otherwise instructed.   Exercise as tolerated.   Keep weight off wounds and reposition every 2 hours if applicable.  Do not get wounds wet in the bath or shower unless otherwise instructed by your physician. If your wound is on your foot or leg, you may purchase a cast bag. Please ask at the pharmacy.  Wash hands with soap and water prior to and after every dressing change.    [x]Wash wounds with: Vashe wash - Apply enough Vashe to soak a piece of gauze and place on wound bed for 5-10 minutes. No need to rinse after soaking.  [x]Kiah wound Topical Treatments: Do not apply lotions, creams, or ointments to the skin around the wound bed unless directed as followed:   Apply around the wound:  Marathon applied by physician in wound care center          [x]Wound Location: right lower leg   Apply Primary Dressing to wound: Foam with

## 2024-01-25 NOTE — PROGRESS NOTES
Multilayer Compression Wrap   (Not Unna) Below the Knee    NAME:  Justin Baeza  YOB: 1965  MEDICAL RECORD NUMBER:  [id number]  DATE:  1/25/2024       Removed old Multilayer wrap if present and washed leg with mild soap/water.   Applied moisturizing agent to dry skin as needed.    Applied primary and secondary dressing as ordered    Applied multilayered dressing below the knee to Right lower leg(s)  (4 Layer Compression Wrap ) .    Instructed patient/caregiver not to remove dressing and to keep it clean and dry.    Instructed patient/caregiver on complications to report to provider, such as pain, numbness in toes, heavy drainage, and slippage of dressing.    Instructed patient on purpose of compression dressing and on activity and exercise recommendations.   Applied per   Guidelines    Electronically signed by Yesenia Hawkins RN on 1/25/2024 at 9:09 AM    
that you refrain from smoking. Smoking inhibits wounds from healing.  When taking antibiotics take the entire prescription as ordered. Do not stop taking until medication is all gone unless otherwise instructed.   Exercise as tolerated.   Keep weight off wounds and reposition every 2 hours if applicable.  Do not get wounds wet in the bath or shower unless otherwise instructed by your physician. If your wound is on your foot or leg, you may purchase a cast bag. Please ask at the pharmacy.  Wash hands with soap and water prior to and after every dressing change.    [x]Wash wounds with: Vashe wash - Apply enough Vashe to soak a piece of gauze and place on wound bed for 5-10 minutes. No need to rinse after soaking.  [x]Kiah wound Topical Treatments: Do not apply lotions, creams, or ointments to the skin around the wound bed unless directed as followed:   Apply around the wound:  Marathon applied by physician in wound care center          [x]Wound Location: right lower leg   Apply Primary Dressing to wound: Foam with silver (I.e. Polymem Ag)  Secondary Dressing: 4X4 gauze pad   Avoid contact of tape with skin if possible.  When to change Dressing: DO NOT CHANGE        [x] Multilayer Compression Wrap:  Type: Applied on Right lower leg(s)  4 Layer Compression Wrap    Do not get leg(s) with wrap wet.  If wraps become too tight call the center or completely remove the wrap.   Elevate leg(s) above the level of the heart when sitting.  Avoid prolonged standing in one place.   Applied in Clinic on 1/25/2024  The Goal of this therapy is to reduce edema and get into long term compression garments to control venous insufficiency, lymphedema and reduce occurrence of venous ulcers    [x] Edema Control: 30-40mmHG  Apply: Compression Stocking on the Left leg  Apply every morning immediately when getting up. Remove every night before going to bed unless instructed otherwise     Elevate leg(s) above the level of the heart for 30 minutes

## 2024-02-01 ENCOUNTER — HOSPITAL ENCOUNTER (OUTPATIENT)
Dept: WOUND CARE | Age: 59
Discharge: HOME | End: 2024-02-01
Attending: SPECIALIST
Payer: COMMERCIAL

## 2024-02-01 VITALS
HEART RATE: 77 BPM | TEMPERATURE: 96.8 F | DIASTOLIC BLOOD PRESSURE: 77 MMHG | RESPIRATION RATE: 16 BRPM | SYSTOLIC BLOOD PRESSURE: 143 MMHG

## 2024-02-01 DIAGNOSIS — I83.009 VENOUS ULCER (HCC): ICD-10-CM

## 2024-02-01 DIAGNOSIS — Z91.199 NONCOMPLIANCE: ICD-10-CM

## 2024-02-01 DIAGNOSIS — L97.909 VENOUS ULCER (HCC): ICD-10-CM

## 2024-02-01 DIAGNOSIS — I89.0 CHRONIC ACQUIRED LYMPHEDEMA: Primary | ICD-10-CM

## 2024-02-01 DIAGNOSIS — I87.331 IDIOPATHIC CHRONIC VENOUS HYPERTENSION OF RIGHT LOWER EXTREMITY WITH ULCER AND INFLAMMATION (HCC): ICD-10-CM

## 2024-02-01 DIAGNOSIS — I89.0 LYMPHEDEMA: ICD-10-CM

## 2024-02-01 PROCEDURE — 87077 CULTURE AEROBIC IDENTIFY: CPT

## 2024-02-01 PROCEDURE — 87205 SMEAR GRAM STAIN: CPT

## 2024-02-01 PROCEDURE — 29581 APPL MULTLAYER CMPRN SYS LEG: CPT

## 2024-02-01 PROCEDURE — 11042 DBRDMT SUBQ TIS 1ST 20SQCM/<: CPT

## 2024-02-01 PROCEDURE — 11042 DBRDMT SUBQ TIS 1ST 20SQCM/<: CPT | Performed by: SPECIALIST

## 2024-02-01 PROCEDURE — 87186 SC STD MICRODIL/AGAR DIL: CPT

## 2024-02-01 PROCEDURE — 11045 DBRDMT SUBQ TISS EACH ADDL: CPT | Performed by: SPECIALIST

## 2024-02-01 PROCEDURE — 87070 CULTURE OTHR SPECIMN AEROBIC: CPT

## 2024-02-01 PROCEDURE — 11045 DBRDMT SUBQ TISS EACH ADDL: CPT

## 2024-02-01 PROCEDURE — 6370000000 HC RX 637 (ALT 250 FOR IP): Performed by: SPECIALIST

## 2024-02-01 RX ORDER — LIDOCAINE 40 MG/G
CREAM TOPICAL ONCE
OUTPATIENT
Start: 2024-02-01 | End: 2024-02-01

## 2024-02-01 RX ORDER — GINSENG 100 MG
CAPSULE ORAL ONCE
OUTPATIENT
Start: 2024-02-01 | End: 2024-02-01

## 2024-02-01 RX ORDER — LIDOCAINE HYDROCHLORIDE 40 MG/ML
SOLUTION TOPICAL ONCE
Status: COMPLETED | OUTPATIENT
Start: 2024-02-01 | End: 2024-02-01

## 2024-02-01 RX ORDER — IBUPROFEN 200 MG
TABLET ORAL ONCE
OUTPATIENT
Start: 2024-02-01 | End: 2024-02-01

## 2024-02-01 RX ORDER — SULFAMETHOXAZOLE AND TRIMETHOPRIM 800; 160 MG/1; MG/1
1 TABLET ORAL 2 TIMES DAILY
Qty: 14 TABLET | Refills: 0 | Status: SHIPPED | OUTPATIENT
Start: 2024-02-01 | End: 2024-02-08

## 2024-02-01 RX ORDER — BACITRACIN ZINC AND POLYMYXIN B SULFATE 500; 1000 [USP'U]/G; [USP'U]/G
OINTMENT TOPICAL ONCE
OUTPATIENT
Start: 2024-02-01 | End: 2024-02-01

## 2024-02-01 RX ORDER — LIDOCAINE HYDROCHLORIDE 20 MG/ML
JELLY TOPICAL ONCE
OUTPATIENT
Start: 2024-02-01 | End: 2024-02-01

## 2024-02-01 RX ORDER — LIDOCAINE 50 MG/G
OINTMENT TOPICAL ONCE
OUTPATIENT
Start: 2024-02-01 | End: 2024-02-01

## 2024-02-01 RX ORDER — TRIAMCINOLONE ACETONIDE 1 MG/G
OINTMENT TOPICAL ONCE
OUTPATIENT
Start: 2024-02-01 | End: 2024-02-01

## 2024-02-01 RX ORDER — SODIUM CHLOR/HYPOCHLOROUS ACID 0.033 %
SOLUTION, IRRIGATION IRRIGATION ONCE
OUTPATIENT
Start: 2024-02-01 | End: 2024-02-01

## 2024-02-01 RX ORDER — LIDOCAINE HYDROCHLORIDE 40 MG/ML
SOLUTION TOPICAL ONCE
OUTPATIENT
Start: 2024-02-01 | End: 2024-02-01

## 2024-02-01 RX ORDER — GENTAMICIN SULFATE 1 MG/G
OINTMENT TOPICAL ONCE
OUTPATIENT
Start: 2024-02-01 | End: 2024-02-01

## 2024-02-01 RX ORDER — CLOBETASOL PROPIONATE 0.5 MG/G
OINTMENT TOPICAL ONCE
OUTPATIENT
Start: 2024-02-01 | End: 2024-02-01

## 2024-02-01 RX ORDER — BETAMETHASONE DIPROPIONATE 0.05 %
OINTMENT (GRAM) TOPICAL ONCE
OUTPATIENT
Start: 2024-02-01 | End: 2024-02-01

## 2024-02-01 RX ADMIN — LIDOCAINE HYDROCHLORIDE: 40 SOLUTION TOPICAL at 08:18

## 2024-02-01 ASSESSMENT — PAIN DESCRIPTION - ORIENTATION: ORIENTATION: RIGHT

## 2024-02-01 ASSESSMENT — PAIN DESCRIPTION - FREQUENCY: FREQUENCY: CONTINUOUS

## 2024-02-01 ASSESSMENT — PAIN DESCRIPTION - PAIN TYPE: TYPE: CHRONIC PAIN

## 2024-02-01 ASSESSMENT — PAIN DESCRIPTION - DESCRIPTORS: DESCRIPTORS: BURNING

## 2024-02-01 ASSESSMENT — PAIN SCALES - GENERAL: PAINLEVEL_OUTOF10: 4

## 2024-02-01 ASSESSMENT — PAIN DESCRIPTION - LOCATION: LOCATION: LEG

## 2024-02-01 NOTE — PROGRESS NOTES
Multilayer Compression Wrap   (Not Unna) Below the Knee    NAME:  Justin Baeza  YOB: 1965  MEDICAL RECORD NUMBER:  [id number]  DATE:  2/1/2024       Removed old Multilayer wrap if present and washed leg with mild soap/water.   Applied moisturizing agent to dry skin as needed.    Applied primary and secondary dressing as ordered    Applied multilayered dressing below the knee to Right lower leg(s)  (4 Layer Compression Wrap ) .    Instructed patient/caregiver not to remove dressing and to keep it clean and dry.    Instructed patient/caregiver on complications to report to provider, such as pain, numbness in toes, heavy drainage, and slippage of dressing.    Instructed patient on purpose of compression dressing and on activity and exercise recommendations.   Applied per   Guidelines    Electronically signed by Kathy Gomez RN on 2/1/2024 at 9:08 AM

## 2024-02-01 NOTE — PATIENT INSTRUCTIONS
silver (I.e. Polymem Ag)  Secondary Dressing: Extra absorbant pad   Avoid contact of tape with skin if possible.  When to change Dressing: DO NOT CHANGE      [x] Multilayer Compression Wrap:  Type: Applied on Right lower leg(s)  4 Layer Compression Wrap    Do not get leg(s) with wrap wet.  If wraps become too tight call the center or completely remove the wrap.   Elevate leg(s) above the level of the heart when sitting.  Avoid prolonged standing in one place.   Applied in Clinic on 2/1/2024  The Goal of this therapy is to reduce edema and get into long term compression garments to control venous insufficiency, lymphedema and reduce occurrence of venous ulcers    [x] Edema Control: 30-40mmHG  Apply: Compression Stocking on the Left leg  Apply every morning immediately when getting up. Remove every night before going to bed unless instructed otherwise     Elevate leg(s) above the level of the heart for 30 minutes 4-5 times a day and/or when sitting.  Avoid prolonged standing in one place.    [x] Lymphedema Therapy:  Wear Lymphedema pumps twice a day at settings prescribed by your physician.   Avoid prolonged standing in one place.      Dietary:  Important dietary reminders:  1. Increase Protein intake (i.e. Lean meats, fish, eggs, legumes, and yogurt)  2. No added salt  3. If diabetic, follow a diabetic diet and check glucose prior to meals or as instructed by your physician.    Dietary Supplements(Take twice a day unless instructed otherwise):  [] Papito  [] 30ml ProStat [] Ensure Complete [] Ensure Max/Premier [] Expedite [] Other:    Your nurse  isBlanche woodall     Electronically signed by Yesenia Hawkins RN on 2/1/2024 at 8:49 AM     Wound Care Center Information: Should you experience any significant changes in your wound(s) or have questions about your wound care, please contact the [facility] at [phone].   Hours of operation:  Mon:  8AM - 2PM  Tue: 11AM -

## 2024-02-01 NOTE — PROGRESS NOTES
[facility]  Progress Note and Procedure Note      Justin Baeza  MEDICAL RECORD NUMBER:  [id number]  AGE: 58 y.o.   GENDER: male  : 1965  EPISODE DATE:  2024    Subjective:     Chief Complaint   Patient presents with    Wound Check     Right lower leg         HISTORY of PRESENT ILLNESS HPI     Justin Baeza is a 58 y.o. male who presents today for wound/ulcer evaluation.   History of Wound Context: Patient continues follow-up for chronic venous insufficiency and lymphedema and ulceration. His 4-layer wrap remained in place this past week without difficulty there is been mild drainage evident. He remains on Cipro. He admits to little utilization of his lymphedema pumps. He remains on his feet for protracted periods of time due to his multiple jobs   Wound/Ulcer Pain Timing/Severity: none  Quality of pain: N/A  Severity:  0 / 10   Modifying Factors: None  Associated Signs/Symptoms: edema and drainage    Ulcer Identification:  Ulcer Type: venous    Contributing Factors: edema, venous stasis, lymphedema, and obesity    Acute Wound: N/A not an acute wound    PAST MEDICAL HISTORY        Diagnosis Date    DVT (deep venous thrombosis) (HCC)     Hx of blood clots     Pain and swelling of right lower leg        PAST SURGICAL HISTORY    Past Surgical History:   Procedure Laterality Date    BALLOON ANGIOPLASTY, ARTERY Right 2017    at [facility]    DILATATION, ESOPHAGUS      SKIN SPLIT GRAFT Right        FAMILY HISTORY    Family History   Problem Relation Age of Onset    Diabetes Mother     Heart Attack Father        SOCIAL HISTORY    Social History     Tobacco Use    Smoking status: Never    Smokeless tobacco: Never   Vaping Use    Vaping Use: Never used   Substance Use Topics    Alcohol use: No    Drug use: No       ALLERGIES    No Known Allergies    MEDICATIONS    Current Outpatient Medications on File Prior to Encounter   Medication Sig Dispense Refill    warfarin (COUMADIN) 5 MG

## 2024-02-04 LAB
BACTERIA SPEC AEROBE CULT: ABNORMAL
BACTERIA SPEC ANAEROBE CULT: ABNORMAL
BACTERIA SPEC ANAEROBE CULT: ABNORMAL
GRAM STN SPEC: ABNORMAL
ORGANISM: ABNORMAL

## 2024-02-05 ENCOUNTER — HOSPITAL ENCOUNTER (OUTPATIENT)
Dept: WOUND CARE | Age: 59
Discharge: HOME | End: 2024-02-05
Attending: SPECIALIST
Payer: COMMERCIAL

## 2024-02-05 ENCOUNTER — ANTI-COAG VISIT (OUTPATIENT)
Dept: PHARMACY | Age: 59
End: 2024-02-05
Payer: COMMERCIAL

## 2024-02-05 VITALS
SYSTOLIC BLOOD PRESSURE: 132 MMHG | TEMPERATURE: 97 F | HEART RATE: 80 BPM | RESPIRATION RATE: 16 BRPM | DIASTOLIC BLOOD PRESSURE: 79 MMHG

## 2024-02-05 DIAGNOSIS — I89.0 LYMPHEDEMA: ICD-10-CM

## 2024-02-05 DIAGNOSIS — I83.009 VENOUS ULCER (HCC): ICD-10-CM

## 2024-02-05 DIAGNOSIS — I87.331 IDIOPATHIC CHRONIC VENOUS HYPERTENSION OF RIGHT LOWER EXTREMITY WITH ULCER AND INFLAMMATION (HCC): ICD-10-CM

## 2024-02-05 DIAGNOSIS — I82.5Z1 CHRONIC VENOUS EMBOLISM AND THROMBOSIS OF DEEP VESSELS OF DISTAL END OF RIGHT LOWER EXTREMITY (HCC): Primary | ICD-10-CM

## 2024-02-05 DIAGNOSIS — Z91.199 NONCOMPLIANCE: ICD-10-CM

## 2024-02-05 DIAGNOSIS — L97.909 VENOUS ULCER (HCC): ICD-10-CM

## 2024-02-05 DIAGNOSIS — I89.0 CHRONIC ACQUIRED LYMPHEDEMA: Primary | ICD-10-CM

## 2024-02-05 LAB — INTERNATIONAL NORMALIZATION RATIO, POC: 3.9

## 2024-02-05 PROCEDURE — 85610 PROTHROMBIN TIME: CPT

## 2024-02-05 PROCEDURE — 99212 OFFICE O/P EST SF 10 MIN: CPT

## 2024-02-05 PROCEDURE — 29581 APPL MULTLAYER CMPRN SYS LEG: CPT

## 2024-02-05 RX ORDER — LIDOCAINE HYDROCHLORIDE 20 MG/ML
JELLY TOPICAL ONCE
OUTPATIENT
Start: 2024-02-05 | End: 2024-02-05

## 2024-02-05 RX ORDER — CLOBETASOL PROPIONATE 0.5 MG/G
OINTMENT TOPICAL ONCE
OUTPATIENT
Start: 2024-02-05 | End: 2024-02-05

## 2024-02-05 RX ORDER — LIDOCAINE 50 MG/G
OINTMENT TOPICAL ONCE
OUTPATIENT
Start: 2024-02-05 | End: 2024-02-05

## 2024-02-05 RX ORDER — LIDOCAINE HYDROCHLORIDE 40 MG/ML
SOLUTION TOPICAL ONCE
Status: CANCELLED | OUTPATIENT
Start: 2024-02-05 | End: 2024-02-05

## 2024-02-05 RX ORDER — GINSENG 100 MG
CAPSULE ORAL ONCE
OUTPATIENT
Start: 2024-02-05 | End: 2024-02-05

## 2024-02-05 RX ORDER — TRIAMCINOLONE ACETONIDE 1 MG/G
OINTMENT TOPICAL ONCE
OUTPATIENT
Start: 2024-02-05 | End: 2024-02-05

## 2024-02-05 RX ORDER — GENTAMICIN SULFATE 1 MG/G
OINTMENT TOPICAL ONCE
OUTPATIENT
Start: 2024-02-05 | End: 2024-02-05

## 2024-02-05 RX ORDER — BACITRACIN ZINC AND POLYMYXIN B SULFATE 500; 1000 [USP'U]/G; [USP'U]/G
OINTMENT TOPICAL ONCE
OUTPATIENT
Start: 2024-02-05 | End: 2024-02-05

## 2024-02-05 RX ORDER — LIDOCAINE 40 MG/G
CREAM TOPICAL ONCE
OUTPATIENT
Start: 2024-02-05 | End: 2024-02-05

## 2024-02-05 RX ORDER — IBUPROFEN 200 MG
TABLET ORAL ONCE
OUTPATIENT
Start: 2024-02-05 | End: 2024-02-05

## 2024-02-05 RX ORDER — SODIUM CHLOR/HYPOCHLOROUS ACID 0.033 %
SOLUTION, IRRIGATION IRRIGATION ONCE
Status: CANCELLED | OUTPATIENT
Start: 2024-02-05 | End: 2024-02-05

## 2024-02-05 RX ORDER — BETAMETHASONE DIPROPIONATE 0.05 %
OINTMENT (GRAM) TOPICAL ONCE
OUTPATIENT
Start: 2024-02-05 | End: 2024-02-05

## 2024-02-05 ASSESSMENT — PAIN DESCRIPTION - ORIENTATION: ORIENTATION: RIGHT

## 2024-02-05 ASSESSMENT — PAIN DESCRIPTION - FREQUENCY: FREQUENCY: CONTINUOUS

## 2024-02-05 ASSESSMENT — PAIN DESCRIPTION - PAIN TYPE: TYPE: CHRONIC PAIN

## 2024-02-05 ASSESSMENT — PAIN SCALES - GENERAL: PAINLEVEL_OUTOF10: 4

## 2024-02-05 ASSESSMENT — PAIN DESCRIPTION - LOCATION: LOCATION: LEG

## 2024-02-05 ASSESSMENT — PAIN DESCRIPTION - DESCRIPTORS: DESCRIPTORS: BURNING

## 2024-02-05 NOTE — PATIENT INSTRUCTIONS
Wound Care Center Physician Orders and Discharge Instructions  [facility]  [address] [facility]. [address] [place] [address]  Telephone: [phone]      FAX [phone]    NAME:  Justin Baeza  YOB: 1965  MEDICAL RECORD NUMBER:  [id number]  DATE:  2/5/2024      Wound care:  Continue to follow the instructions and recommendations from your last doctor visit.  The dressing(s) applied is the same as your last visit. Please refer to your last discharge instruction for the information on your wound care.      If there were any changes made, please follow the instructions as written here: none    Future Appointments     Future Appointments   Date Time Provider Department Center   2/5/2024  9:30 AM [facility] MEDICATION MANAGEMENT [facility]   2/8/2024  8:15 AM Khang Giles MD [facility] WOUND [facility]           Your nurse  is:  Yesenia       Electronically signed by Kathy Gomez RN on 2/5/2024 at 8:34 AM     Wound Care Center Information: Should you experience any significant changes in your wound(s) or have questions about your wound care, please contact the [facility] at [phone]. Our hours vary so please leave a message. Please give us 24-48 hours to return your call.   If you need help with your wounds and cannot wait until we are available, contact your PCP or go to the hospital emergency room.       Physician orders by:  Dr. Giles        The information contained in the After Visit Summary has been reviewed with me, the patient and/or responsible adult, by my health care provider(s).  I had the opportunity to ask questions regarding this information.  I have elected to receive;      [] Patient unable to sign Discharge Instructions. Given to ECF/Transportation/POA

## 2024-02-05 NOTE — PROGRESS NOTES
Multilayer Compression Wrap   (Not Unna) Below the Knee    NAME:  Justin Baeza  YOB: 1965  MEDICAL RECORD NUMBER:  [id number]  DATE:  2/5/2024       Removed old Multilayer wrap if present and washed leg with mild soap/water.   Applied moisturizing agent to dry skin as needed.    Applied primary and secondary dressing as ordered    Applied multilayered dressing below the knee to Right lower leg(s)  (4 Layer Compression Wrap ) .    Instructed patient/caregiver not to remove dressing and to keep it clean and dry.    Instructed patient/caregiver on complications to report to provider, such as pain, numbness in toes, heavy drainage, and slippage of dressing.    Instructed patient on purpose of compression dressing and on activity and exercise recommendations.   Applied per   Guidelines    Electronically signed by Kahty Gomez RN on 2/5/2024 at 8:33 AM

## 2024-02-05 NOTE — PROGRESS NOTES
ANTICOAGULATION SERVICE    Justin Baeza is a 58 y.o. male with PMHx significant for chronic DVT (last in Jan, 2019) who presents to clinic 2/5/2024 for anticoagulation monitoring and adjustment.  Patient has been taking warfarin for 15+ years.     Anticoagulation Indication(s):  DVT    Referring Physician:  Dr. Orozco  Goal INR Range:  2-3  Duration of Anticoagulation Therapy:  Indefinite  Time of day dose taken:  AM  Product patient has at home:  warfarin 5 mg (peach)    INR Summary                            Warfarin regimen (mg)  Date INR   A/P    Sun Mon Tue Wed Thu Fri Sat Mg/wk  2/5 3.9 Above goal, hold   7.5 5 0/7.5 5/7.5 7.5 5 7.5 47.5  1/17 3.4 Above goal, continue  7.5 5 7.5 7.5 7.5 5 7.5 47.5  11/22 2.7 At goal, continue  7.5 5 7.5 7.5 7.5 5 7.5 47.5  10/11 2.9 At goal, continue  7.5 5 7.5 7.5 7.5 5 7.5 47.5  8/28 2.7  At goal, continue  7.5 5 7.5 7.5 7.5 5 7.5 47.5  8/2 2.37 Per lab    7/7 2.4  At goal, continue  7.5 5 7.5 7.5 7.5 5 7.5 47.5  5/26 2.7  At goal, continue  7.5 5 7.5 7.5 7.5 5 7.5 47.5  4/28 3.2 Above goal, continue  7.5 5 7.5 7.5 7.5 5 7.5 47.5  2/16 2.9 At goal, continue  7.5 5 7.5 7.5 7.5 5 7.5 47.5  1/9 2.9 At goal, continue  7.5 5 7.5 7.5 7.5 5 7.5 47.5  12/5 2.5 At goal, continue  7.5 5 7.5 7.5 7.5 5 7.5 47.5  11/7 2.3 At goal, continue  7.5 5 7.5 7.5 7.5 5 7.5 47.5  10/10 2.9 At goal, continue  7.5 5 7.5 7.5 7.5 5 7.5 47.5  7/27 3.1 Above goal, continue    7.5 5 7.5 7.5 7.5 5 7.5 47.5  7/6 3.1 Above goal, decrease  7.5 5 7.5 7.5 7.5 5 7.5 47.5  6/22 2.4 At goal, resume prv dose 7.5 7.5 7.5 7.5 7.5 5 7.5 50  6/1 2.6 At goal, continue   7.5 5 7.5 5 7.5 5 7.5 45  5/18 4.1 Above goal, dec (abx)  7.5 5 7.5 5 7.5 5 7.5 45  4/6 2.8 At goal, continue  7.5 7.5 7.5 7.5 7.5 5 7.5 50  12/1 2.9 At goal, continue  7.5 7.5 7.5 7.5 7.5 5 7.5 50  11/10 2.4 At goal, continue   7.5 7.5 7.5 7.5 7.5 5 7.5 50  10/27 4.4 Above goal, hold+dec  7.5 7.5 7.5 7.5 0/7.5 5 7.5 50  8/25 2.9 At goal,

## 2024-02-06 ENCOUNTER — TELEPHONE (OUTPATIENT)
Dept: INFECTIOUS DISEASES | Age: 59
End: 2024-02-06

## 2024-02-06 ENCOUNTER — TELEMEDICINE (OUTPATIENT)
Dept: INFECTIOUS DISEASES | Age: 59
End: 2024-02-06
Payer: COMMERCIAL

## 2024-02-06 DIAGNOSIS — L97.919 LEG ULCER, RIGHT, WITH UNSPECIFIED SEVERITY (HCC): ICD-10-CM

## 2024-02-06 DIAGNOSIS — I87.311 IDIOPATHIC CHRONIC VENOUS HYPERTENSION OF RIGHT LOWER EXTREMITY WITH ULCER (HCC): ICD-10-CM

## 2024-02-06 DIAGNOSIS — A49.01 STAPH AUREUS INFECTION: ICD-10-CM

## 2024-02-06 DIAGNOSIS — A49.8 PSEUDOMONAS INFECTION: Primary | ICD-10-CM

## 2024-02-06 DIAGNOSIS — L97.919 IDIOPATHIC CHRONIC VENOUS HYPERTENSION OF RIGHT LOWER EXTREMITY WITH ULCER (HCC): ICD-10-CM

## 2024-02-06 PROCEDURE — 99215 OFFICE O/P EST HI 40 MIN: CPT | Performed by: INTERNAL MEDICINE

## 2024-02-06 PROCEDURE — G8428 CUR MEDS NOT DOCUMENT: HCPCS | Performed by: INTERNAL MEDICINE

## 2024-02-06 PROCEDURE — 3017F COLORECTAL CA SCREEN DOC REV: CPT | Performed by: INTERNAL MEDICINE

## 2024-02-06 NOTE — PROGRESS NOTES
Infectious Diseases Follow-up Note    Reason for Consult:   R leg wound infection  Requesting Physician:   Dr Giles  Primary Care Physician:  Keily Orozco MD  History Obtained From:   Patient, EPIC    CHIEF COMPLAINT:      Chief Complaint   Patient presents with    Follow-up     Follow up wound infection -- Dr Benton       HISTORY OF PRESENT ILLNESS:      MOST RECENT VISIT ON TOP:    2/6/23 Office Visit -     Justin Baeza, was evaluated through a synchronous (real-time) audio-video encounter. The patient (or guardian if applicable) is aware that this is a billable service, which includes applicable co-pays. This Virtual Visit was conducted with patient's (and/or legal guardian's) consent. Patient identification was verified, and a caregiver was present when appropriate.   The patient was located at Other:  40 min   Provider was located at Facility (Appt Dept): [address] [place] [id number]     Total time spent for this encounter:  40 min    Pt with chronic LE lymphedema with chronic venous wounds  Last flare / infection 8/2023, treated with Zosyn x 4 weeks     Seen in [facility] 2/1/24 -  Pt had '4layer wap', 'mild drainage', taking Ciprofloxacin  Sent cult   Enterococcus faecalis   Antibiotic Interpretation Microscan    ampicillin Sensitive <=2 mcg/mL   Ceftaroline    N/R mcg/mL   ciprofloxacin Resistant >2 mcg/mL   piperacillin-tazobactam  <=8 mcg/mL   vancomycin Sensitive 1 mcg/mL     Pseudomonas aeruginosa   Antibiotic Interpretation Microscan    cefepime Sensitive 8 mcg/mL   ciprofloxacin Resistant >2 mcg/mL   gentamicin Sensitive 4 mcg/mL   levofloxacin Resistant >4 mcg/mL   meropenem Sensitive <=1 mcg/mL   piperacillin-tazobactam Sensitive 16 mcg/mL   tobramycin Sensitive <=2 mcg/mL     Staphylococcus aureus (1)  Antibiotic Interpretation Microscan    ceFAZolin Sensitive <=8 mcg/mL   clindamycin Sensitive <=0.25 mcg/mL   erythromycin Resistant >4 mcg/mL   meropenem Sensitive <=2

## 2024-02-06 NOTE — TELEPHONE ENCOUNTER
Contacted Opal, Picc nurse at [facility].  Pt is on coumadin and will need to hold coumadin for 2 days (Dr Agustin gave the ok to hold as this is a chronic med for pt)  PICC can be placed on 2/8/24 at 0900, given the INR is less than 3.     Left vm for pt to please return my call to discuss PICC placement, date and time and to please hold coumadin starting tonight and Return visit in 2 weeks, can be VV   My name, title, Dr Agustin's name, specialty and affiliation  Pt name   My direct number [phone]      Faxed OPAT orders to [facility] - confirmation to be scanned to chart  Faxed picc placement and lab orders to [facility]IC - confirmation to be scanned to chart

## 2024-02-06 NOTE — TELEPHONE ENCOUNTER
Left vm for pt asking for a return call to office this am to schedule a VV with Dr Agustin for 1240 today  My name, title, Dr Agustin's name, specialty and affiliation  Pt name   Office number [phone]

## 2024-02-06 NOTE — TELEPHONE ENCOUNTER
----- Message from Roberto Agustin MD sent at 2/6/2024  1:56 PM EST -----  INFUSION ORDERS:  Zosyn 13.5 gm iv daily through 2/27/24  - Diagnosis - R leg wound infection  - First dose given in hospital  - PICC   - Disposition / date discharge  - Check CBC w diff, CMP, ESR, CRPevery Mon or Tue - FAX result to [phone]  - Call with antibiotic / infusion issues, [phone]  - No f/u in outpatient ID office necessary

## 2024-02-07 NOTE — TELEPHONE ENCOUNTER
"Called patient emergency contact, \"Simone Cox\", son and ask that he have his mother to please call our office.  " Spoke with patient and verified he did hold coumadin yesterday and he will hold again today  Pt knows to arrive to [facility] tomorrow, Thursday February 8th at 0900 for labs  Explained we are holding the coumadin to hopefully bring his INR down to below 3  IF below 3, PICC will be placed  Pt verbalized understanding

## 2024-02-08 ENCOUNTER — HOSPITAL ENCOUNTER (OUTPATIENT)
Dept: WOUND CARE | Age: 59
Discharge: HOME | End: 2024-02-08
Attending: SPECIALIST
Payer: COMMERCIAL

## 2024-02-08 ENCOUNTER — HOSPITAL ENCOUNTER (OUTPATIENT)
Dept: ONCOLOGY | Age: 59
Setting detail: INFUSION SERIES
Discharge: HOME | End: 2024-02-08
Payer: COMMERCIAL

## 2024-02-08 VITALS
TEMPERATURE: 97 F | HEART RATE: 70 BPM | DIASTOLIC BLOOD PRESSURE: 86 MMHG | RESPIRATION RATE: 16 BRPM | SYSTOLIC BLOOD PRESSURE: 142 MMHG

## 2024-02-08 VITALS
DIASTOLIC BLOOD PRESSURE: 77 MMHG | RESPIRATION RATE: 14 BRPM | SYSTOLIC BLOOD PRESSURE: 138 MMHG | OXYGEN SATURATION: 99 % | HEART RATE: 58 BPM

## 2024-02-08 DIAGNOSIS — I83.009 VENOUS ULCER (HCC): ICD-10-CM

## 2024-02-08 DIAGNOSIS — A49.8 PSEUDOMONAS INFECTION: ICD-10-CM

## 2024-02-08 DIAGNOSIS — I89.0 CHRONIC ACQUIRED LYMPHEDEMA: Primary | ICD-10-CM

## 2024-02-08 DIAGNOSIS — I89.0 LYMPHEDEMA: ICD-10-CM

## 2024-02-08 DIAGNOSIS — I87.331 IDIOPATHIC CHRONIC VENOUS HYPERTENSION OF RIGHT LOWER EXTREMITY WITH ULCER AND INFLAMMATION (HCC): ICD-10-CM

## 2024-02-08 DIAGNOSIS — L97.919 LEG ULCER, RIGHT, WITH UNSPECIFIED SEVERITY (HCC): Primary | ICD-10-CM

## 2024-02-08 DIAGNOSIS — Z91.199 NONCOMPLIANCE: ICD-10-CM

## 2024-02-08 DIAGNOSIS — L97.909 VENOUS ULCER (HCC): ICD-10-CM

## 2024-02-08 DIAGNOSIS — L97.919 LEG ULCER, RIGHT, WITH UNSPECIFIED SEVERITY (HCC): ICD-10-CM

## 2024-02-08 LAB
ALBUMIN SERPL-MCNC: 3.8 G/DL (ref 3.4–5)
ALBUMIN/GLOB SERPL: 1.2 {RATIO} (ref 1.1–2.2)
ALP SERPL-CCNC: 79 U/L (ref 40–129)
ALT SERPL-CCNC: 16 U/L (ref 10–40)
ANION GAP SERPL CALCULATED.3IONS-SCNC: 8 MMOL/L (ref 3–16)
AST SERPL-CCNC: 18 U/L (ref 15–37)
BASOPHILS # BLD: 0 K/UL (ref 0–0.2)
BASOPHILS NFR BLD: 0.2 %
BILIRUB SERPL-MCNC: 0.6 MG/DL (ref 0–1)
BUN SERPL-MCNC: 15 MG/DL (ref 7–20)
CALCIUM SERPL-MCNC: 8.5 MG/DL (ref 8.3–10.6)
CHLORIDE SERPL-SCNC: 109 MMOL/L (ref 99–110)
CO2 SERPL-SCNC: 23 MMOL/L (ref 21–32)
CREAT SERPL-MCNC: 1 MG/DL (ref 0.9–1.3)
CRP SERPL-MCNC: 11.4 MG/L (ref 0–5.1)
DEPRECATED RDW RBC AUTO: 16.2 % (ref 12.4–15.4)
EOSINOPHIL # BLD: 0.2 K/UL (ref 0–0.6)
EOSINOPHIL NFR BLD: 3.9 %
ERYTHROCYTE [SEDIMENTATION RATE] IN BLOOD BY WESTERGREN METHOD: 30 MM/HR (ref 0–20)
GFR SERPLBLD CREATININE-BSD FMLA CKD-EPI: >60 ML/MIN/{1.73_M2}
GLUCOSE SERPL-MCNC: 94 MG/DL (ref 70–99)
HCT VFR BLD AUTO: 38 % (ref 40.5–52.5)
HGB BLD-MCNC: 12.4 G/DL (ref 13.5–17.5)
INR PPP: 1.82 (ref 0.84–1.16)
LYMPHOCYTES # BLD: 1.8 K/UL (ref 1–5.1)
LYMPHOCYTES NFR BLD: 34.6 %
MCH RBC QN AUTO: 27.3 PG (ref 26–34)
MCHC RBC AUTO-ENTMCNC: 32.6 G/DL (ref 31–36)
MCV RBC AUTO: 83.6 FL (ref 80–100)
MONOCYTES # BLD: 0.6 K/UL (ref 0–1.3)
MONOCYTES NFR BLD: 11.2 %
NEUTROPHILS # BLD: 2.6 K/UL (ref 1.7–7.7)
NEUTROPHILS NFR BLD: 50.1 %
PLATELET # BLD AUTO: 202 K/UL (ref 135–450)
PMV BLD AUTO: 6.4 FL (ref 5–10.5)
POTASSIUM SERPL-SCNC: 4.6 MMOL/L (ref 3.5–5.1)
PROT SERPL-MCNC: 7.1 G/DL (ref 6.4–8.2)
PROTHROMBIN TIME: 21 SEC (ref 11.5–14.8)
RBC # BLD AUTO: 4.55 M/UL (ref 4.2–5.9)
SODIUM SERPL-SCNC: 140 MMOL/L (ref 136–145)
WBC # BLD AUTO: 5.2 K/UL (ref 4–11)

## 2024-02-08 PROCEDURE — 11045 DBRDMT SUBQ TISS EACH ADDL: CPT | Performed by: SPECIALIST

## 2024-02-08 PROCEDURE — 11042 DBRDMT SUBQ TIS 1ST 20SQCM/<: CPT | Performed by: SPECIALIST

## 2024-02-08 PROCEDURE — 29581 APPL MULTLAYER CMPRN SYS LEG: CPT

## 2024-02-08 PROCEDURE — C1751 CATH, INF, PER/CENT/MIDLINE: HCPCS

## 2024-02-08 PROCEDURE — 80053 COMPREHEN METABOLIC PANEL: CPT

## 2024-02-08 PROCEDURE — 85652 RBC SED RATE AUTOMATED: CPT

## 2024-02-08 PROCEDURE — 6370000000 HC RX 637 (ALT 250 FOR IP): Performed by: SPECIALIST

## 2024-02-08 PROCEDURE — 86140 C-REACTIVE PROTEIN: CPT

## 2024-02-08 PROCEDURE — 36569 INSJ PICC 5 YR+ W/O IMAGING: CPT

## 2024-02-08 PROCEDURE — 85025 COMPLETE CBC W/AUTO DIFF WBC: CPT

## 2024-02-08 PROCEDURE — 11042 DBRDMT SUBQ TIS 1ST 20SQCM/<: CPT

## 2024-02-08 PROCEDURE — 11045 DBRDMT SUBQ TISS EACH ADDL: CPT

## 2024-02-08 PROCEDURE — 2500000003 HC RX 250 WO HCPCS: Performed by: INTERNAL MEDICINE

## 2024-02-08 PROCEDURE — 85610 PROTHROMBIN TIME: CPT

## 2024-02-08 RX ORDER — TRIAMCINOLONE ACETONIDE 1 MG/G
OINTMENT TOPICAL ONCE
OUTPATIENT
Start: 2024-02-08 | End: 2024-02-08

## 2024-02-08 RX ORDER — LIDOCAINE HYDROCHLORIDE 20 MG/ML
JELLY TOPICAL ONCE
OUTPATIENT
Start: 2024-02-08 | End: 2024-02-08

## 2024-02-08 RX ORDER — BACITRACIN ZINC AND POLYMYXIN B SULFATE 500; 1000 [USP'U]/G; [USP'U]/G
OINTMENT TOPICAL ONCE
OUTPATIENT
Start: 2024-02-08 | End: 2024-02-08

## 2024-02-08 RX ORDER — GENTAMICIN SULFATE 1 MG/G
OINTMENT TOPICAL ONCE
OUTPATIENT
Start: 2024-02-08 | End: 2024-02-08

## 2024-02-08 RX ORDER — SODIUM CHLORIDE 9 MG/ML
25 INJECTION, SOLUTION INTRAVENOUS PRN
Status: DISCONTINUED | OUTPATIENT
Start: 2024-02-08 | End: 2024-02-09 | Stop reason: HOSPADM

## 2024-02-08 RX ORDER — GINSENG 100 MG
CAPSULE ORAL ONCE
OUTPATIENT
Start: 2024-02-08 | End: 2024-02-08

## 2024-02-08 RX ORDER — IBUPROFEN 200 MG
TABLET ORAL ONCE
OUTPATIENT
Start: 2024-02-08 | End: 2024-02-08

## 2024-02-08 RX ORDER — SODIUM CHLORIDE 0.9 % (FLUSH) 0.9 %
5-40 SYRINGE (ML) INJECTION PRN
Status: DISCONTINUED | OUTPATIENT
Start: 2024-02-08 | End: 2024-02-09 | Stop reason: HOSPADM

## 2024-02-08 RX ORDER — LIDOCAINE HYDROCHLORIDE 10 MG/ML
5 INJECTION, SOLUTION EPIDURAL; INFILTRATION; INTRACAUDAL; PERINEURAL ONCE
Status: COMPLETED | OUTPATIENT
Start: 2024-02-08 | End: 2024-02-08

## 2024-02-08 RX ORDER — SODIUM CHLOR/HYPOCHLOROUS ACID 0.033 %
SOLUTION, IRRIGATION IRRIGATION ONCE
OUTPATIENT
Start: 2024-02-08 | End: 2024-02-08

## 2024-02-08 RX ORDER — CLOBETASOL PROPIONATE 0.5 MG/G
OINTMENT TOPICAL ONCE
OUTPATIENT
Start: 2024-02-08 | End: 2024-02-08

## 2024-02-08 RX ORDER — BETAMETHASONE DIPROPIONATE 0.05 %
OINTMENT (GRAM) TOPICAL ONCE
OUTPATIENT
Start: 2024-02-08 | End: 2024-02-08

## 2024-02-08 RX ORDER — LIDOCAINE HYDROCHLORIDE 40 MG/ML
SOLUTION TOPICAL ONCE
OUTPATIENT
Start: 2024-02-08 | End: 2024-02-08

## 2024-02-08 RX ORDER — LIDOCAINE 40 MG/G
CREAM TOPICAL ONCE
OUTPATIENT
Start: 2024-02-08 | End: 2024-02-08

## 2024-02-08 RX ORDER — LIDOCAINE 50 MG/G
OINTMENT TOPICAL ONCE
OUTPATIENT
Start: 2024-02-08 | End: 2024-02-08

## 2024-02-08 RX ORDER — SODIUM CHLOR/HYPOCHLOROUS ACID 0.033 %
SOLUTION, IRRIGATION IRRIGATION ONCE
Status: COMPLETED | OUTPATIENT
Start: 2024-02-08 | End: 2024-02-08

## 2024-02-08 RX ORDER — LIDOCAINE HYDROCHLORIDE 40 MG/ML
SOLUTION TOPICAL ONCE
Status: COMPLETED | OUTPATIENT
Start: 2024-02-08 | End: 2024-02-08

## 2024-02-08 RX ORDER — SODIUM CHLORIDE 0.9 % (FLUSH) 0.9 %
5-40 SYRINGE (ML) INJECTION EVERY 12 HOURS SCHEDULED
Status: DISCONTINUED | OUTPATIENT
Start: 2024-02-08 | End: 2024-02-09 | Stop reason: HOSPADM

## 2024-02-08 RX ADMIN — LIDOCAINE HYDROCHLORIDE: 40 SOLUTION TOPICAL at 08:06

## 2024-02-08 RX ADMIN — Medication: at 08:33

## 2024-02-08 RX ADMIN — LIDOCAINE HYDROCHLORIDE ANHYDROUS 5 ML: 10 INJECTION, SOLUTION INFILTRATION at 11:24

## 2024-02-08 ASSESSMENT — PAIN SCALES - GENERAL: PAINLEVEL_OUTOF10: 3

## 2024-02-08 ASSESSMENT — PAIN DESCRIPTION - ORIENTATION: ORIENTATION: RIGHT

## 2024-02-08 ASSESSMENT — PAIN DESCRIPTION - DESCRIPTORS: DESCRIPTORS: BURNING

## 2024-02-08 ASSESSMENT — PAIN DESCRIPTION - FREQUENCY: FREQUENCY: CONTINUOUS

## 2024-02-08 ASSESSMENT — PAIN DESCRIPTION - PAIN TYPE: TYPE: CHRONIC PAIN

## 2024-02-08 NOTE — PROGRESS NOTES
Pt seen in [facility] infusion for PICC placement. Labs and chart review done by this RN. PICC placement to be performed by PICC RN, Opal. Pt understands discharge instructions. Discharged ambulatory home by himself.

## 2024-02-08 NOTE — PATIENT INSTRUCTIONS
operation:  Mon:  8AM - 2PM  Tue: 11AM - 5PM  Wed: CLOSED  Thur: 8AM - 4:30PM  Fri:  8AM - 4:30PM  The office is closed on all major holidays.    Please give us 24-48 business hours to return your call.  These hours of operation are subject to change. If you need help with your wounds and cannot wait until we are available, contact your PCP or go to your preferred emergency room.     Call your doctor now or seek immediate medical care if:    You have symptoms of infection, such as:  Increased pain, swelling, warmth, or redness.  Red streaks leading from the area.  Pus draining from the area.  A fever.         [] Patient unable to sign Discharge Instructions given to ECF/Transportation/POA

## 2024-02-08 NOTE — PROCEDURES
SINGLE PICC PROCEDURE NOTE  Chart reviewed for allergies, diagnosis, labs, known contraindications, reason for line placement and planned length of treatment.  Informed consent noted to be signed and on chart.  Insertion procedure discussed with patient/family member.  Three patient identifiers - Patient name,   and MRN -  completed &  confirmed verbally.         Time out performed Hat, mask and eye shield donned.  PICC site cleaned with chlorhexidine wipes then scrubbed with Chloraprep  One-Step applicator for 30 seconds x 1.   Hand Hygiene  performed with 3% Chlorhexidine surgical scrub x1 min prior to  sterile gloves, sterile gown being donned.  Patient draped using maximal sterile barrier technique ( head to toe ).  PICC site scrubbed a 2nd time with Chloraprep One-Step applicator x 30 sec. Modified Seldinger technique/ultrasound assisted and tip locating system utilized for insertion and 1% Lidocaine 5 ml injected intradermal pre-insertion.  PICC tip location in the SVC confirmed by ECG technology.   Positive brisk blood return obtained from lumen.  Valve applied to lumen and flushed with 10 mls  0.9% Sterile Sodium Chloride.  All lumens flush easily with no resistance.  Skin prep applied to site.  Catheter secured with non-sutured locking device per hospital protocol. Bio-patch/CHG impregnated sterile tegaderm dressing applied.  Alcohol Swab Cap applied to valve.  Sterile field maintained during procedure.  PICC insertion, rhythm and positioning wire (utilized prn) accounted for post procedure and disposed of in sharps.  Appearance of site is Clean dry and intact without bleeding or edema. All edges of Tegaderm occlusive.   Site marked with date and initials of RN placing line. Teaching performed to pt/family and noted in education section.   Bed placed in low position post-procedure. Top 2 side rails in up position call button in reach.Pt.

## 2024-02-08 NOTE — PROGRESS NOTES
Multilayer Compression Wrap   (Not Unna) Below the Knee    NAME:  Justin Baeza  YOB: 1965  MEDICAL RECORD NUMBER:  [id number]  DATE:  2/8/2024       Removed old Multilayer wrap if present and washed leg with mild soap/water.   Applied moisturizing agent to dry skin as needed.    Applied primary and secondary dressing as ordered    Applied multilayered dressing below the knee to Right lower leg(s)  (4 Layer Compression Wrap ) .    Instructed patient/caregiver not to remove dressing and to keep it clean and dry.    Instructed patient/caregiver on complications to report to provider, such as pain, numbness in toes, heavy drainage, and slippage of dressing.    Instructed patient on purpose of compression dressing and on activity and exercise recommendations.   Applied per   Guidelines    Electronically signed by Yesenia Hawkins RN on 2/8/2024 at 8:43 AM    
Provider Department Center   2/8/2024  9:00 AM [facility] SEMI INFUSION 6 [facility] INF None   2/12/2024  9:15 AM [facility] MEDICATION MANAGEMENT [facility]         Orders Placed This Encounter   Procedures    Initiate Outpatient Wound Care Protocol       Home Care Company: none    Medically necessary services for evaluation and treatment: []Skilled Nursing (using clean technique) []PT (Eval & Treat) []OT (Eval & Treat) []Social Work []Dietician []Other:      Wound care instructions:  If you smoke we ask that you refrain from smoking. Smoking inhibits wounds from healing.  When taking antibiotics take the entire prescription as ordered. Do not stop taking until medication is all gone unless otherwise instructed.   Exercise as tolerated.   Keep weight off wounds and reposition every 2 hours if applicable.  Do not get wounds wet in the bath or shower unless otherwise instructed by your physician. If your wound is on your foot or leg, you may purchase a cast bag. Please ask at the pharmacy.  Wash hands with soap and water prior to and after every dressing change.    [x]Wash wounds with: Vashe wash - Apply enough Vashe to soak a piece of gauze and place on wound bed for 5-10 minutes. No need to rinse after soaking.  [x]Kiah wound Topical Treatments: Do not apply lotions, creams, or ointments to the skin around the wound bed unless directed as followed:   Apply around the wound: Zinc paste         [x]Wound Location: right lower leg wounds   Apply Primary Dressing to wound: Foam with silver (I.e. Polymem Ag)  Secondary Dressing: Extra absorbant pad   Avoid contact of tape with skin if possible.  When to change Dressing: DO NOT CHANGE      [x] Multilayer Compression Wrap:  Type: Applied on Right lower leg(s)  4 Layer Compression Wrap    Do not get leg(s) with wrap wet.  If wraps become too tight call the center or completely remove the wrap.   Elevate leg(s) above the level of the heart when sitting.  Avoid prolonged

## 2024-02-08 NOTE — DISCHARGE INSTRUCTIONS
sometimes deadly infections. To prevent infection, it’s very important that you, your caregivers and others around you use good hand hygiene. This means washing your hands well with soap and water, and cleaning them with alcohol based hand gel as directed. Never touch the PICC or dressing without first using one of the methods.   To wash your hands with soap and water:  . Wet your hands with warm water. (Avoid hot water, which can cause skin irritation when you wash your hands often).  . Apply enough soap to cover the entire surface of your hands, including fingers.  . Rub your hands together vigorously for at least 15 seconds. Make sure to rub the front and back of each hand up to the wrist, your fingers and fingernails, between the fingers, and each thumb.  . Rinse your hands with warm water  . Dry your hands completely with a new paper towel. Don’t use a cloth towel or other reusable towel. These can harbor germs.  . Use the paper towel to turn off the faucet, then throw it away. If you are in a bathroom, also use a paper towel to open the door instead of touching the handle.  When you don’t have access to soap and water: Use alcohol-based hand gel. The gel should have at least 60% alcohol. Follow the instructions on the package. Your healthcare team can answer any questions you have about when to use hand gel, or when it’s better to wash with soap and water.    When to Call Your Healthcare Provider  Call your provider right away if you have any of the following:  . Pain or burning in your shoulder, chest, back, arm, or leg  . Fever of 100.4 F or higher  . Chills  . Signs of infection at the catheter site (pain, redness, drainage, burning, or stinging  . Coughing, wheezing, or shortness of breath  . A racing or irregular heartbeat  . Muscle stiffness or trouble moving  . Tightness in your arm, above the catheter site  . Gurgling noises coming from the catheter  . The catheter falls out, breaks, cracks, leaks, or

## 2024-02-12 ENCOUNTER — ANTI-COAG VISIT (OUTPATIENT)
Dept: PHARMACY | Age: 59
End: 2024-02-12
Payer: COMMERCIAL

## 2024-02-12 ENCOUNTER — TELEPHONE (OUTPATIENT)
Dept: INFECTIOUS DISEASES | Age: 59
End: 2024-02-12

## 2024-02-12 ENCOUNTER — HOSPITAL ENCOUNTER (OUTPATIENT)
Dept: WOUND CARE | Age: 59
Discharge: HOME | End: 2024-02-12
Attending: SPECIALIST
Payer: COMMERCIAL

## 2024-02-12 VITALS
HEART RATE: 77 BPM | DIASTOLIC BLOOD PRESSURE: 82 MMHG | SYSTOLIC BLOOD PRESSURE: 122 MMHG | RESPIRATION RATE: 16 BRPM | TEMPERATURE: 97 F

## 2024-02-12 DIAGNOSIS — I82.5Z1 CHRONIC VENOUS EMBOLISM AND THROMBOSIS OF DEEP VESSELS OF DISTAL END OF RIGHT LOWER EXTREMITY (HCC): Primary | ICD-10-CM

## 2024-02-12 LAB — INTERNATIONAL NORMALIZATION RATIO, POC: 2.2

## 2024-02-12 PROCEDURE — 99211 OFF/OP EST MAY X REQ PHY/QHP: CPT | Performed by: PHARMACIST

## 2024-02-12 PROCEDURE — 29581 APPL MULTLAYER CMPRN SYS LEG: CPT

## 2024-02-12 PROCEDURE — 85610 PROTHROMBIN TIME: CPT | Performed by: PHARMACIST

## 2024-02-12 NOTE — PATIENT INSTRUCTIONS
Wound Care Center Physician Orders and Discharge Instructions  [facility]  [address] [facility]. [address] [place] [address]  Telephone: [phone]      FAX [phone]    NAME:  Justin Baeza  YOB: 1965  MEDICAL RECORD NUMBER:  [id number]  DATE:  2/12/2024      Wound care:  Continue to follow the instructions and recommendations from your last doctor visit.  The dressing(s) applied is the same as your last visit. Please refer to your last discharge instruction for the information on your wound care.      If there were any changes made, please follow the instructions as written here:     Future Appointments     Future Appointments   Date Time Provider Department Center   2/12/2024  9:15 AM [facility] MEDICATION MANAGEMENT [facility] MM [facility]   2/15/2024  8:00 AM Khang Giles MD [facility] WOUND [facility]   2/21/2024 12:00 PM Roberto Agustin MD [facility] MMA           Your nurse  is:  Yesenia     Electronically signed by Mera Coelho RN on 2/12/2024 at 8:33 AM     Wound Care Center Information: Should you experience any significant changes in your wound(s) or have questions about your wound care, please contact the [facility] at [phone]. Our hours vary so please leave a message. Please give us 24-48 hours to return your call.   If you need help with your wounds and cannot wait until we are available, contact your PCP or go to the hospital emergency room.       Physician orders by:  Dr. Giles        The information contained in the After Visit Summary has been reviewed with me, the patient and/or responsible adult, by my health care provider(s).  I had the opportunity to ask questions regarding this information.  I have elected to receive;      [] Patient unable to sign Discharge Instructions. Given to ECF/Transportation/POA

## 2024-02-12 NOTE — TELEPHONE ENCOUNTER
Spoke with Adri at [facility] and verified OPAT orders   Zosyn 13.5 gm iv daily through 2/27/24   Check CBC w diff, CMP, ESR, CRPevery Mon or Tue    OPAT Nurse Coordinator Weekly Update Note    Current OPAT plan:  Zosyn 13.5 gm iv daily through 2/27/24     Diagnosis:  R leg wound infection     Assessment:  Spoke with pt  He states his leg is already feeling much better.  Pt denies any adverse SE from IV ATB.      Follow up appts:  Dr Giles 2/15 at 0800  Dr Agustin on 2/21/24

## 2024-02-12 NOTE — PROGRESS NOTES
ANTICOAGULATION SERVICE    Justin Baeza is a 58 y.o. male with PMHx significant for chronic DVT (last in Jan, 2019) who presents to clinic 2/12/2024 for anticoagulation monitoring and adjustment.  Patient has been taking warfarin for 15+ years.     Anticoagulation Indication(s):  DVT    Referring Physician:  Dr. Orozco  Goal INR Range:  2-3  Duration of Anticoagulation Therapy:  Indefinite  Time of day dose taken:  AM  Product patient has at home:  warfarin 5 mg (peach)    INR Summary                            Warfarin regimen (mg)  Date INR   A/P    Sun Mon Tue Wed Thu Fri Sat Mg/wk  2/12 2.2 At goal, continue   7.5 5 7.5 7.5 7.5 5 7.5 47.5  2/8 1.82 Per TJ   2/5 3.9 Above goal, hold   7.5 5 0/7.5 5/7.5 7.5 5 7.5 47.5  1/17 3.4 Above goal, continue  7.5 5 7.5 7.5 7.5 5 7.5 47.5  11/22 2.7 At goal, continue  7.5 5 7.5 7.5 7.5 5 7.5 47.5  10/11 2.9 At goal, continue  7.5 5 7.5 7.5 7.5 5 7.5 47.5  8/28 2.7  At goal, continue  7.5 5 7.5 7.5 7.5 5 7.5 47.5  8/2 2.37 Per lab    7/7 2.4  At goal, continue  7.5 5 7.5 7.5 7.5 5 7.5 47.5  5/26 2.7  At goal, continue  7.5 5 7.5 7.5 7.5 5 7.5 47.5  4/28 3.2 Above goal, continue  7.5 5 7.5 7.5 7.5 5 7.5 47.5  2/16 2.9 At goal, continue  7.5 5 7.5 7.5 7.5 5 7.5 47.5  1/9 2.9 At goal, continue  7.5 5 7.5 7.5 7.5 5 7.5 47.5  12/5 2.5 At goal, continue  7.5 5 7.5 7.5 7.5 5 7.5 47.5  11/7 2.3 At goal, continue  7.5 5 7.5 7.5 7.5 5 7.5 47.5  10/10 2.9 At goal, continue  7.5 5 7.5 7.5 7.5 5 7.5 47.5  7/27 3.1 Above goal, continue    7.5 5 7.5 7.5 7.5 5 7.5 47.5  7/6 3.1 Above goal, decrease  7.5 5 7.5 7.5 7.5 5 7.5 47.5  6/22 2.4 At goal, resume prv dose 7.5 7.5 7.5 7.5 7.5 5 7.5 50  6/1 2.6 At goal, continue   7.5 5 7.5 5 7.5 5 7.5 45  5/18 4.1 Above goal, dec (abx)  7.5 5 7.5 5 7.5 5 7.5 45  4/6 2.8 At goal, continue  7.5 7.5 7.5 7.5 7.5 5 7.5 50  12/1 2.9 At goal, continue  7.5 7.5 7.5 7.5 7.5 5 7.5 50  11/10 2.4 At goal, continue   7.5 7.5 7.5 7.5 7.5 5 7.5 50  10/27 4.4 Above

## 2024-02-13 LAB
ALBUMIN SERPL-MCNC: 4.1 G/DL (ref 3.4–5)
ALBUMIN/GLOB SERPL: 1.5 {RATIO} (ref 1.1–2.2)
ALP SERPL-CCNC: 76 U/L (ref 40–129)
ALT SERPL-CCNC: 23 U/L (ref 10–40)
ANION GAP SERPL CALCULATED.3IONS-SCNC: 12 MMOL/L (ref 3–16)
AST SERPL-CCNC: 26 U/L (ref 15–37)
BASOPHILS # BLD: 0 K/UL (ref 0–0.2)
BASOPHILS NFR BLD: 0.3 %
BILIRUB SERPL-MCNC: 0.5 MG/DL (ref 0–1)
BUN SERPL-MCNC: 17 MG/DL (ref 7–20)
CALCIUM SERPL-MCNC: 8.1 MG/DL (ref 8.3–10.6)
CHLORIDE SERPL-SCNC: 105 MMOL/L (ref 99–110)
CO2 SERPL-SCNC: 23 MMOL/L (ref 21–32)
CREAT SERPL-MCNC: 0.9 MG/DL (ref 0.9–1.3)
CRP SERPL-MCNC: 24.5 MG/L (ref 0–5.1)
DEPRECATED RDW RBC AUTO: 16.6 % (ref 12.4–15.4)
EOSINOPHIL # BLD: 0.3 K/UL (ref 0–0.6)
EOSINOPHIL NFR BLD: 8.5 %
ERYTHROCYTE [SEDIMENTATION RATE] IN BLOOD BY WESTERGREN METHOD: 23 MM/HR (ref 0–20)
GFR SERPLBLD CREATININE-BSD FMLA CKD-EPI: >60 ML/MIN/{1.73_M2}
GLUCOSE SERPL-MCNC: 93 MG/DL (ref 70–99)
HCT VFR BLD AUTO: 36.8 % (ref 40.5–52.5)
HGB BLD-MCNC: 12.3 G/DL (ref 13.5–17.5)
LYMPHOCYTES # BLD: 1 K/UL (ref 1–5.1)
LYMPHOCYTES NFR BLD: 27 %
MCH RBC QN AUTO: 27.9 PG (ref 26–34)
MCHC RBC AUTO-ENTMCNC: 33.5 G/DL (ref 31–36)
MCV RBC AUTO: 83.4 FL (ref 80–100)
MONOCYTES # BLD: 0.4 K/UL (ref 0–1.3)
MONOCYTES NFR BLD: 11.2 %
NEUTROPHILS # BLD: 2 K/UL (ref 1.7–7.7)
NEUTROPHILS NFR BLD: 53 %
PLATELET # BLD AUTO: 175 K/UL (ref 135–450)
PMV BLD AUTO: 7.1 FL (ref 5–10.5)
POTASSIUM SERPL-SCNC: 4.1 MMOL/L (ref 3.5–5.1)
PROT SERPL-MCNC: 6.9 G/DL (ref 6.4–8.2)
RBC # BLD AUTO: 4.41 M/UL (ref 4.2–5.9)
SODIUM SERPL-SCNC: 140 MMOL/L (ref 136–145)
WBC # BLD AUTO: 3.8 K/UL (ref 4–11)

## 2024-02-15 ENCOUNTER — HOSPITAL ENCOUNTER (OUTPATIENT)
Dept: WOUND CARE | Age: 59
Discharge: HOME | End: 2024-02-15
Attending: SPECIALIST
Payer: COMMERCIAL

## 2024-02-15 VITALS
RESPIRATION RATE: 16 BRPM | HEART RATE: 61 BPM | TEMPERATURE: 97.7 F | DIASTOLIC BLOOD PRESSURE: 83 MMHG | SYSTOLIC BLOOD PRESSURE: 160 MMHG

## 2024-02-15 DIAGNOSIS — I89.0 LYMPHEDEMA: ICD-10-CM

## 2024-02-15 DIAGNOSIS — I89.0 CHRONIC ACQUIRED LYMPHEDEMA: Primary | ICD-10-CM

## 2024-02-15 DIAGNOSIS — I87.331 IDIOPATHIC CHRONIC VENOUS HYPERTENSION OF RIGHT LOWER EXTREMITY WITH ULCER AND INFLAMMATION (HCC): ICD-10-CM

## 2024-02-15 DIAGNOSIS — Z91.199 NONCOMPLIANCE: ICD-10-CM

## 2024-02-15 DIAGNOSIS — I83.009 VENOUS ULCER (HCC): ICD-10-CM

## 2024-02-15 DIAGNOSIS — L97.909 VENOUS ULCER (HCC): ICD-10-CM

## 2024-02-15 PROCEDURE — 11042 DBRDMT SUBQ TIS 1ST 20SQCM/<: CPT

## 2024-02-15 PROCEDURE — 6370000000 HC RX 637 (ALT 250 FOR IP): Performed by: SPECIALIST

## 2024-02-15 PROCEDURE — 29581 APPL MULTLAYER CMPRN SYS LEG: CPT

## 2024-02-15 PROCEDURE — 11042 DBRDMT SUBQ TIS 1ST 20SQCM/<: CPT | Performed by: SPECIALIST

## 2024-02-15 RX ORDER — IBUPROFEN 200 MG
TABLET ORAL ONCE
OUTPATIENT
Start: 2024-02-15 | End: 2024-02-15

## 2024-02-15 RX ORDER — GENTAMICIN SULFATE 1 MG/G
OINTMENT TOPICAL ONCE
OUTPATIENT
Start: 2024-02-15 | End: 2024-02-15

## 2024-02-15 RX ORDER — CLOBETASOL PROPIONATE 0.5 MG/G
OINTMENT TOPICAL ONCE
OUTPATIENT
Start: 2024-02-15 | End: 2024-02-15

## 2024-02-15 RX ORDER — LIDOCAINE 40 MG/G
CREAM TOPICAL ONCE
OUTPATIENT
Start: 2024-02-15 | End: 2024-02-15

## 2024-02-15 RX ORDER — LIDOCAINE 50 MG/G
OINTMENT TOPICAL ONCE
OUTPATIENT
Start: 2024-02-15 | End: 2024-02-15

## 2024-02-15 RX ORDER — LIDOCAINE HYDROCHLORIDE 40 MG/ML
SOLUTION TOPICAL ONCE
OUTPATIENT
Start: 2024-02-15 | End: 2024-02-15

## 2024-02-15 RX ORDER — SODIUM CHLOR/HYPOCHLOROUS ACID 0.033 %
SOLUTION, IRRIGATION IRRIGATION ONCE
Status: COMPLETED | OUTPATIENT
Start: 2024-02-15 | End: 2024-02-15

## 2024-02-15 RX ORDER — LIDOCAINE HYDROCHLORIDE 40 MG/ML
SOLUTION TOPICAL ONCE
Status: COMPLETED | OUTPATIENT
Start: 2024-02-15 | End: 2024-02-15

## 2024-02-15 RX ORDER — GINSENG 100 MG
CAPSULE ORAL ONCE
OUTPATIENT
Start: 2024-02-15 | End: 2024-02-15

## 2024-02-15 RX ORDER — LIDOCAINE HYDROCHLORIDE 20 MG/ML
JELLY TOPICAL ONCE
OUTPATIENT
Start: 2024-02-15 | End: 2024-02-15

## 2024-02-15 RX ORDER — BETAMETHASONE DIPROPIONATE 0.05 %
OINTMENT (GRAM) TOPICAL ONCE
OUTPATIENT
Start: 2024-02-15 | End: 2024-02-15

## 2024-02-15 RX ORDER — BACITRACIN ZINC AND POLYMYXIN B SULFATE 500; 1000 [USP'U]/G; [USP'U]/G
OINTMENT TOPICAL ONCE
OUTPATIENT
Start: 2024-02-15 | End: 2024-02-15

## 2024-02-15 RX ORDER — TRIAMCINOLONE ACETONIDE 1 MG/G
OINTMENT TOPICAL ONCE
OUTPATIENT
Start: 2024-02-15 | End: 2024-02-15

## 2024-02-15 RX ORDER — SODIUM CHLOR/HYPOCHLOROUS ACID 0.033 %
SOLUTION, IRRIGATION IRRIGATION ONCE
OUTPATIENT
Start: 2024-02-15 | End: 2024-02-15

## 2024-02-15 RX ADMIN — LIDOCAINE HYDROCHLORIDE: 40 SOLUTION TOPICAL at 08:18

## 2024-02-15 RX ADMIN — Medication: at 08:44

## 2024-02-15 NOTE — PATIENT INSTRUCTIONS
Wound Care Center Physician Orders and Discharge Instructions  [facility]   [address]  Telephone: [phone] FAX [phone]  NAME:  Justin Baeza  YOB: 1965  MEDICAL RECORD NUMBER:  [id number]  DATE: 2/15/24     Return Appointment:  Return Appointment: With Khang Giles MD  in  1 Week(s)  [x] Return Appointment for a Wound Assessment with the nurse on: Monday, 2/12/24    Future Appointments   Date Time Provider Department Center   2/21/2024 12:00 PM Roberto Agustin MD [facility]   3/14/2024  9:15 AM [facility] MEDICATION MANAGEMENT [facility]         Orders Placed This Encounter   Procedures    Initiate Outpatient Wound Care Protocol       Home Care Company: none    Medically necessary services for evaluation and treatment: []Skilled Nursing (using clean technique) []PT (Eval & Treat) []OT (Eval & Treat) []Social Work []Dietician []Other:      Wound care instructions:  If you smoke we ask that you refrain from smoking. Smoking inhibits wounds from healing.  When taking antibiotics take the entire prescription as ordered. Do not stop taking until medication is all gone unless otherwise instructed.   Exercise as tolerated.   Keep weight off wounds and reposition every 2 hours if applicable.  Do not get wounds wet in the bath or shower unless otherwise instructed by your physician. If your wound is on your foot or leg, you may purchase a cast bag. Please ask at the pharmacy.  Wash hands with soap and water prior to and after every dressing change.    [x]Wash wounds with: Vashe wash - Apply enough Vashe to soak a piece of gauze and place on wound bed for 5-10 minutes. No need to rinse after soaking.  [x]Kiah wound Topical Treatments: Do not apply lotions, creams, or ointments to the skin around the wound bed unless directed as followed:   Apply around the wound: Zinc paste         [x]Wound Location: right lower leg

## 2024-02-15 NOTE — PROGRESS NOTES
Multilayer Compression Wrap   (Not Unna) Below the Knee    NAME:  Justin Baeza  YOB: 1965  MEDICAL RECORD NUMBER:  [id number]  DATE:  2/15/2024       Removed old Multilayer wrap if present and washed leg with mild soap/water.   Applied moisturizing agent to dry skin as needed.    Applied primary and secondary dressing as ordered    Applied multilayered dressing below the knee to Right lower leg(s)  (4 Layer Compression Wrap ) .    Instructed patient/caregiver not to remove dressing and to keep it clean and dry.    Instructed patient/caregiver on complications to report to provider, such as pain, numbness in toes, heavy drainage, and slippage of dressing.    Instructed patient on purpose of compression dressing and on activity and exercise recommendations.   Applied per   Guidelines    Electronically signed by Kathy Gomez RN on 2/15/2024 at 8:56 AM      
symptoms of infection, such as:  Increased pain, swelling, warmth, or redness.  Red streaks leading from the area.  Pus draining from the area.  A fever.         [] Patient unable to sign Discharge Instructions given to ECF/Transportation/POA    Electronically signed by CHICO VELIZ MD on 2/15/2024 at 10:55 AM     
no dryness

## 2024-02-19 ENCOUNTER — HOSPITAL ENCOUNTER (OUTPATIENT)
Dept: WOUND CARE | Age: 59
Discharge: HOME | End: 2024-02-19
Attending: SPECIALIST
Payer: COMMERCIAL

## 2024-02-19 VITALS — SYSTOLIC BLOOD PRESSURE: 149 MMHG | RESPIRATION RATE: 16 BRPM | DIASTOLIC BLOOD PRESSURE: 62 MMHG | HEART RATE: 67 BPM

## 2024-02-19 PROCEDURE — 29581 APPL MULTLAYER CMPRN SYS LEG: CPT

## 2024-02-19 ASSESSMENT — PAIN SCALES - GENERAL: PAINLEVEL_OUTOF10: 2

## 2024-02-19 ASSESSMENT — PAIN DESCRIPTION - ORIENTATION: ORIENTATION: RIGHT

## 2024-02-19 ASSESSMENT — PAIN DESCRIPTION - LOCATION: LOCATION: LEG

## 2024-02-19 NOTE — PROGRESS NOTES
Multilayer Compression Wrap   (Not Unna) Below the Knee    NAME:  Justin Baeza  YOB: 1965  MEDICAL RECORD NUMBER:  [id number]  DATE:  2/19/2024       Removed old Multilayer wrap if present and washed leg with mild soap/water.   Applied moisturizing agent to dry skin as needed.    Applied primary and secondary dressing as ordered    Applied multilayered dressing below the knee to Right lower leg(s)  (4 Layer Compression Wrap ) .    Instructed patient/caregiver not to remove dressing and to keep it clean and dry.    Instructed patient/caregiver on complications to report to provider, such as pain, numbness in toes, heavy drainage, and slippage of dressing.    Instructed patient on purpose of compression dressing and on activity and exercise recommendations.   Applied per   Guidelines    Electronically signed by Kathy Gomez RN on 2/19/2024 at 8:17 AM

## 2024-02-19 NOTE — PATIENT INSTRUCTIONS
.Wound Care Center Physician Orders and Discharge Instructions  [facility]  [address] [facility]. [address] [place] [address]  Telephone: [phone]      FAX [phone]    NAME:  Justin Baeza  YOB: 1965  MEDICAL RECORD NUMBER:  [id number]  DATE:  2/19/2024      Wound care:  Continue to follow the instructions and recommendations from your last doctor visit.  The dressing(s) applied is the same as your last visit. Please refer to your last discharge instruction for the information on your wound care.      If there were any changes made, please follow the instructions as written here: None    Future Appointments     Future Appointments   Date Time Provider Department Center   2/21/2024 12:00 PM Roberto Agustin MD [facility]   2/22/2024  8:00 AM Khang Giles MD [facility] WOUND [facility]   3/14/2024  9:15 AM [facility] MEDICATION MANAGEMENT [facility]           Your nurse  is:  Yesenia     Electronically signed by Kathy Gomez RN on 2/19/2024 at 8:22 AM     Wound Care Center Information: Should you experience any significant changes in your wound(s) or have questions about your wound care, please contact the [facility] at [phone]. Our hours vary so please leave a message. Please give us 24-48 hours to return your call.   If you need help with your wounds and cannot wait until we are available, contact your PCP or go to the hospital emergency room.       Physician orders by:  Dr. Giles        The information contained in the After Visit Summary has been reviewed with me, the patient and/or responsible adult, by my health care provider(s).  I had the opportunity to ask questions regarding this information.  I have elected to receive;      [] Patient unable to sign Discharge Instructions. Given to ECF/Transportation/POA

## 2024-02-21 ENCOUNTER — TELEMEDICINE (OUTPATIENT)
Dept: INFECTIOUS DISEASES | Age: 59
End: 2024-02-21
Payer: COMMERCIAL

## 2024-02-21 DIAGNOSIS — L97.919 LEG ULCER, RIGHT, WITH UNSPECIFIED SEVERITY (HCC): ICD-10-CM

## 2024-02-21 DIAGNOSIS — A49.01 STAPH AUREUS INFECTION: ICD-10-CM

## 2024-02-21 DIAGNOSIS — L03.115 CELLULITIS OF RIGHT LEG: Primary | ICD-10-CM

## 2024-02-21 DIAGNOSIS — A49.8 PSEUDOMONAS INFECTION: ICD-10-CM

## 2024-02-21 PROCEDURE — 99213 OFFICE O/P EST LOW 20 MIN: CPT | Performed by: INTERNAL MEDICINE

## 2024-02-21 PROCEDURE — 3017F COLORECTAL CA SCREEN DOC REV: CPT | Performed by: INTERNAL MEDICINE

## 2024-02-21 PROCEDURE — G8427 DOCREV CUR MEDS BY ELIG CLIN: HCPCS | Performed by: INTERNAL MEDICINE

## 2024-02-21 NOTE — PROGRESS NOTES
Infectious Diseases Follow-up Note    Reason for Consult:   R leg wound infection  Requesting Physician:   Dr Giles  Primary Care Physician:  Keily Orozco MD  History Obtained From:   Patient, EPIC    CHIEF COMPLAINT:      Chief Complaint   Patient presents with    Follow-up     Follow up right leg ulcer        HISTORY OF PRESENT ILLNESS:      MOST RECENT VISIT ON TOP:    2/21/24 Office visit / Follow-up    Justin Baeza, was evaluated through a synchronous (real-time) audio-video encounter. The patient (or guardian if applicable) is aware that this is a billable service, which includes applicable co-pays. This Virtual Visit was conducted with patient's (and/or legal guardian's) consent. Patient identification was verified, and a caregiver was present when appropriate.   The patient was located: in car  Provider was located at Facility (Appt Dept): [address] [place] [address]     Total time spent for this encounter:  30 min    Pt is taking and tolerating iv Zosyn - 13.5 gm by CI  Pt reports R leg 'better' has less drainage and odor  Wound Care Center f/u on 1/2/24 2/6/23 Office Visit -     Justin Baeza, was evaluated through a synchronous (real-time) audio-video encounter. The patient (or guardian if applicable) is aware that this is a billable service, which includes applicable co-pays. This Virtual Visit was conducted with patient's (and/or legal guardian's) consent. Patient identification was verified, and a caregiver was present when appropriate.   The patient was located at Other:  40 min   Provider was located at Facility (Appt Dept): [address] [place] [address]  [address] [place] [address]     Total time spent for this encounter:  40 min    Pt with chronic LE lymphedema with chronic venous wounds  Last flare / infection 8/2023, treated with Zosyn x 4 weeks     Seen in [facility] 2/1/24 -  Pt had '4layer wap', 'mild drainage', taking Ciprofloxacin  Sent cult   Enterococcus

## 2024-02-22 ENCOUNTER — HOSPITAL ENCOUNTER (OUTPATIENT)
Dept: WOUND CARE | Age: 59
Discharge: HOME | End: 2024-02-22
Attending: SPECIALIST
Payer: COMMERCIAL

## 2024-02-22 VITALS
SYSTOLIC BLOOD PRESSURE: 143 MMHG | HEART RATE: 67 BPM | RESPIRATION RATE: 16 BRPM | TEMPERATURE: 97 F | DIASTOLIC BLOOD PRESSURE: 81 MMHG

## 2024-02-22 DIAGNOSIS — I87.331 IDIOPATHIC CHRONIC VENOUS HYPERTENSION OF RIGHT LOWER EXTREMITY WITH ULCER AND INFLAMMATION (HCC): ICD-10-CM

## 2024-02-22 DIAGNOSIS — L97.909 VENOUS ULCER (HCC): ICD-10-CM

## 2024-02-22 DIAGNOSIS — I89.0 CHRONIC ACQUIRED LYMPHEDEMA: Primary | ICD-10-CM

## 2024-02-22 DIAGNOSIS — I83.009 VENOUS ULCER (HCC): ICD-10-CM

## 2024-02-22 DIAGNOSIS — I89.0 LYMPHEDEMA: ICD-10-CM

## 2024-02-22 DIAGNOSIS — Z91.199 NONCOMPLIANCE: ICD-10-CM

## 2024-02-22 PROCEDURE — 6370000000 HC RX 637 (ALT 250 FOR IP): Performed by: SPECIALIST

## 2024-02-22 PROCEDURE — 11042 DBRDMT SUBQ TIS 1ST 20SQCM/<: CPT

## 2024-02-22 PROCEDURE — 29581 APPL MULTLAYER CMPRN SYS LEG: CPT

## 2024-02-22 RX ORDER — GENTAMICIN SULFATE 1 MG/G
OINTMENT TOPICAL ONCE
OUTPATIENT
Start: 2024-02-22 | End: 2024-02-22

## 2024-02-22 RX ORDER — BETAMETHASONE DIPROPIONATE 0.05 %
OINTMENT (GRAM) TOPICAL ONCE
OUTPATIENT
Start: 2024-02-22 | End: 2024-02-22

## 2024-02-22 RX ORDER — SODIUM CHLOR/HYPOCHLOROUS ACID 0.033 %
SOLUTION, IRRIGATION IRRIGATION ONCE
OUTPATIENT
Start: 2024-02-22 | End: 2024-02-22

## 2024-02-22 RX ORDER — LIDOCAINE 40 MG/G
CREAM TOPICAL ONCE
OUTPATIENT
Start: 2024-02-22 | End: 2024-02-22

## 2024-02-22 RX ORDER — LIDOCAINE HYDROCHLORIDE 40 MG/ML
SOLUTION TOPICAL ONCE
OUTPATIENT
Start: 2024-02-22 | End: 2024-02-22

## 2024-02-22 RX ORDER — GINSENG 100 MG
CAPSULE ORAL ONCE
OUTPATIENT
Start: 2024-02-22 | End: 2024-02-22

## 2024-02-22 RX ORDER — CLOBETASOL PROPIONATE 0.5 MG/G
OINTMENT TOPICAL ONCE
OUTPATIENT
Start: 2024-02-22 | End: 2024-02-22

## 2024-02-22 RX ORDER — LIDOCAINE HYDROCHLORIDE 20 MG/ML
JELLY TOPICAL ONCE
OUTPATIENT
Start: 2024-02-22 | End: 2024-02-22

## 2024-02-22 RX ORDER — SODIUM CHLOR/HYPOCHLOROUS ACID 0.033 %
SOLUTION, IRRIGATION IRRIGATION ONCE
Status: COMPLETED | OUTPATIENT
Start: 2024-02-22 | End: 2024-02-22

## 2024-02-22 RX ORDER — LIDOCAINE 50 MG/G
OINTMENT TOPICAL ONCE
OUTPATIENT
Start: 2024-02-22 | End: 2024-02-22

## 2024-02-22 RX ORDER — TRIAMCINOLONE ACETONIDE 1 MG/G
OINTMENT TOPICAL ONCE
OUTPATIENT
Start: 2024-02-22 | End: 2024-02-22

## 2024-02-22 RX ORDER — LIDOCAINE HYDROCHLORIDE 40 MG/ML
SOLUTION TOPICAL ONCE
Status: COMPLETED | OUTPATIENT
Start: 2024-02-22 | End: 2024-02-22

## 2024-02-22 RX ORDER — BACITRACIN ZINC AND POLYMYXIN B SULFATE 500; 1000 [USP'U]/G; [USP'U]/G
OINTMENT TOPICAL ONCE
OUTPATIENT
Start: 2024-02-22 | End: 2024-02-22

## 2024-02-22 RX ORDER — IBUPROFEN 200 MG
TABLET ORAL ONCE
OUTPATIENT
Start: 2024-02-22 | End: 2024-02-22

## 2024-02-22 RX ADMIN — Medication: at 09:09

## 2024-02-22 RX ADMIN — LIDOCAINE HYDROCHLORIDE: 40 SOLUTION TOPICAL at 08:12

## 2024-02-22 ASSESSMENT — PAIN SCALES - GENERAL: PAINLEVEL_OUTOF10: 2

## 2024-02-22 ASSESSMENT — PAIN DESCRIPTION - DESCRIPTORS: DESCRIPTORS: BURNING

## 2024-02-22 ASSESSMENT — PAIN DESCRIPTION - LOCATION: LOCATION: LEG

## 2024-02-22 ASSESSMENT — PAIN DESCRIPTION - ORIENTATION: ORIENTATION: RIGHT

## 2024-02-22 ASSESSMENT — PAIN DESCRIPTION - FREQUENCY: FREQUENCY: CONTINUOUS

## 2024-02-22 ASSESSMENT — PAIN DESCRIPTION - PAIN TYPE: TYPE: CHRONIC PAIN

## 2024-02-22 NOTE — PATIENT INSTRUCTIONS
Wound Care Center Physician Orders and Discharge Instructions  [facility]   [address]  Telephone: [phone] FAX [phone]  NAME:  Justin Baeza  YOB: 1965  MEDICAL RECORD NUMBER:  [id number]  DATE: 2/22/24     Return Appointment:  Return Appointment: With Khang Giles MD  in  1 Week(s)  [x] Return Appointment for a Wound Assessment with the nurse on:    Future Appointments   Date Time Provider Department Center   3/14/2024  9:15 AM [facility] MEDICATION MANAGEMENT [facility]         Orders Placed This Encounter   Procedures    Initiate Outpatient Wound Care Protocol       Home Care Company: none    Medically necessary services for evaluation and treatment: []Skilled Nursing (using clean technique) []PT (Eval & Treat) []OT (Eval & Treat) []Social Work []Dietician []Other:      Wound care instructions:  If you smoke we ask that you refrain from smoking. Smoking inhibits wounds from healing.  When taking antibiotics take the entire prescription as ordered. Do not stop taking until medication is all gone unless otherwise instructed.   Exercise as tolerated.   Keep weight off wounds and reposition every 2 hours if applicable.  Do not get wounds wet in the bath or shower unless otherwise instructed by your physician. If your wound is on your foot or leg, you may purchase a cast bag. Please ask at the pharmacy.  Wash hands with soap and water prior to and after every dressing change.    [x]Wash wounds with: Vashe wash - Apply enough Vashe to soak a piece of gauze and place on wound bed for 5-10 minutes. No need to rinse after soaking.  [x]Kiah wound Topical Treatments: Do not apply lotions, creams, or ointments to the skin around the wound bed unless directed as followed:   Apply around the wound: Zinc paste         [x]Wound Location: right lower leg wounds, except right anterior/medial ankle    Apply Primary Dressing to wound: Foam with

## 2024-02-22 NOTE — PROGRESS NOTES
Multilayer Compression Wrap   (Not Unna) Below the Knee    NAME:  Justin Baeza  YOB: 1965  MEDICAL RECORD NUMBER:  [id number]  DATE:  2/22/2024       Removed old Multilayer wrap if present and washed leg with mild soap/water.   Applied moisturizing agent to dry skin as needed.    Applied primary and secondary dressing as ordered    Applied multilayered dressing below the knee to Right lower leg(s)  (4 Layer Compression Wrap ) .    Instructed patient/caregiver not to remove dressing and to keep it clean and dry.    Instructed patient/caregiver on complications to report to provider, such as pain, numbness in toes, heavy drainage, and slippage of dressing.    Instructed patient on purpose of compression dressing and on activity and exercise recommendations.   Applied per   Guidelines    Electronically signed by Mera Coelho RN on 2/22/2024 at 9:32 AM

## 2024-02-22 NOTE — PROGRESS NOTES
[facility]  Progress Note and Procedure Note      Justin Baeza  MEDICAL RECORD NUMBER:  [id number]  AGE: 58 y.o.   GENDER: male  : 1965  EPISODE DATE:  2024    Subjective:     Chief Complaint   Patient presents with    Wound Check     Right lower leg         HISTORY of PRESENT ILLNESS HPI     Justin Baeza is a 58 y.o. male who presents today for wound/ulcer evaluation.   History of Wound Context: Patient continues follow-up for chronic venous insufficiency with lymphedema and ulceration. There continues to be moderate drainage from his 4-layer compression wrap he has seen Dr. Agustin from infectious disease who agrees to place patient back on a course of IV Zosyn based on recent tissue culture reports done here in wound care. Patient once again admits to very little utilization of his lymphedema pumps. He is on his feet for protracted periods of time due to his multiple jobs.  There has been less drainage this past week according to the patient   Wound/Ulcer Pain Timing/Severity: none  Quality of pain: N/A  Severity:  0 / 10   Modifying Factors: None  Associated Signs/Symptoms: edema and drainage    Ulcer Identification:  Ulcer Type: venous    Contributing Factors: edema, venous stasis, lymphedema, and obesity    Acute Wound: N/A not an acute wound    PAST MEDICAL HISTORY        Diagnosis Date    DVT (deep venous thrombosis) (HCC)     Hx of blood clots     Pain and swelling of right lower leg        PAST SURGICAL HISTORY    Past Surgical History:   Procedure Laterality Date    BALLOON ANGIOPLASTY, ARTERY Right 2017    at [facility]    DILATATION, ESOPHAGUS      SKIN SPLIT GRAFT Right        FAMILY HISTORY    Family History   Problem Relation Age of Onset    Diabetes Mother     Heart Attack Father        SOCIAL HISTORY    Social History     Tobacco Use    Smoking status: Never    Smokeless tobacco: Never   Vaping Use    Vaping Use: Never used   Substance Use Topics

## 2024-02-26 ENCOUNTER — TELEPHONE (OUTPATIENT)
Dept: INFECTIOUS DISEASES | Age: 59
End: 2024-02-26

## 2024-02-26 NOTE — TELEPHONE ENCOUNTER
OPAT Nurse Coordinator Weekly Update Note    Current OPAT plan:  Zosyn 13.5 gm iv daily through 2/27/24       Diagnosis:  R leg wound infection      Assessment:  Spoke with pt.  Scheduled him for a 2 week follow up per Dr Agustin LOV note.  Pt is scheduled for a VV on 3/6 at 1200  Pt states he is doing well.  Still some drainage from wound but \"much better\"  Pt denies any adverse effects from IV ATB  Pt is anxious to be finished with or know if, atb done or extended after 2/27 dose  I assured pt this will be discussed with Dr Agustin and the team and as soon as a decision has been made, I will call him.  Pt verbalized understanding    Follow up appts:  Dr Giles 2/29

## 2024-02-27 ENCOUNTER — TELEPHONE (OUTPATIENT)
Dept: INFECTIOUS DISEASES | Age: 59
End: 2024-02-27

## 2024-02-27 NOTE — TELEPHONE ENCOUNTER
Per Dr Agustin; end IV ATB as scheduled    OPAT End  Call made to pharmacy, spoke with Margareth at [facility] and gave verbal order to be finished with IV ATB and pull PICC after todays dose    Call made to [facility], spoke with Lore and [facility] and gave verbal order to pull PICC    Call made to patient, notified pt we are finished with IV ATB and [facility] will be contacting him to remove the PICC line  Pt verbalized understanidng    Will f/u in 1-2 days to verify line removal

## 2024-02-29 ENCOUNTER — HOSPITAL ENCOUNTER (OUTPATIENT)
Dept: WOUND CARE | Age: 59
Discharge: HOME | End: 2024-02-29
Attending: SPECIALIST
Payer: COMMERCIAL

## 2024-02-29 VITALS
RESPIRATION RATE: 16 BRPM | HEART RATE: 73 BPM | SYSTOLIC BLOOD PRESSURE: 149 MMHG | DIASTOLIC BLOOD PRESSURE: 85 MMHG | TEMPERATURE: 97.1 F

## 2024-02-29 DIAGNOSIS — L97.909 VENOUS ULCER (HCC): ICD-10-CM

## 2024-02-29 DIAGNOSIS — I89.0 CHRONIC ACQUIRED LYMPHEDEMA: Primary | ICD-10-CM

## 2024-02-29 DIAGNOSIS — I83.009 VENOUS ULCER (HCC): ICD-10-CM

## 2024-02-29 DIAGNOSIS — I89.0 LYMPHEDEMA: ICD-10-CM

## 2024-02-29 DIAGNOSIS — Z91.199 NONCOMPLIANCE: ICD-10-CM

## 2024-02-29 DIAGNOSIS — I87.331 IDIOPATHIC CHRONIC VENOUS HYPERTENSION OF RIGHT LOWER EXTREMITY WITH ULCER AND INFLAMMATION (HCC): ICD-10-CM

## 2024-02-29 PROCEDURE — 11042 DBRDMT SUBQ TIS 1ST 20SQCM/<: CPT | Performed by: SPECIALIST

## 2024-02-29 PROCEDURE — 11042 DBRDMT SUBQ TIS 1ST 20SQCM/<: CPT

## 2024-02-29 PROCEDURE — 6370000000 HC RX 637 (ALT 250 FOR IP): Performed by: SPECIALIST

## 2024-02-29 PROCEDURE — 29581 APPL MULTLAYER CMPRN SYS LEG: CPT

## 2024-02-29 RX ORDER — CLOBETASOL PROPIONATE 0.5 MG/G
OINTMENT TOPICAL ONCE
OUTPATIENT
Start: 2024-02-29 | End: 2024-02-29

## 2024-02-29 RX ORDER — SODIUM CHLOR/HYPOCHLOROUS ACID 0.033 %
SOLUTION, IRRIGATION IRRIGATION ONCE
OUTPATIENT
Start: 2024-02-29 | End: 2024-02-29

## 2024-02-29 RX ORDER — LIDOCAINE 40 MG/G
CREAM TOPICAL ONCE
OUTPATIENT
Start: 2024-02-29 | End: 2024-02-29

## 2024-02-29 RX ORDER — IBUPROFEN 200 MG
TABLET ORAL ONCE
OUTPATIENT
Start: 2024-02-29 | End: 2024-02-29

## 2024-02-29 RX ORDER — GINSENG 100 MG
CAPSULE ORAL ONCE
OUTPATIENT
Start: 2024-02-29 | End: 2024-02-29

## 2024-02-29 RX ORDER — LIDOCAINE HYDROCHLORIDE 20 MG/ML
JELLY TOPICAL ONCE
OUTPATIENT
Start: 2024-02-29 | End: 2024-02-29

## 2024-02-29 RX ORDER — BETAMETHASONE DIPROPIONATE 0.05 %
OINTMENT (GRAM) TOPICAL ONCE
OUTPATIENT
Start: 2024-02-29 | End: 2024-02-29

## 2024-02-29 RX ORDER — LIDOCAINE HYDROCHLORIDE 40 MG/ML
SOLUTION TOPICAL ONCE
Status: COMPLETED | OUTPATIENT
Start: 2024-02-29 | End: 2024-02-29

## 2024-02-29 RX ORDER — TRIAMCINOLONE ACETONIDE 1 MG/G
OINTMENT TOPICAL ONCE
OUTPATIENT
Start: 2024-02-29 | End: 2024-02-29

## 2024-02-29 RX ORDER — BACITRACIN ZINC AND POLYMYXIN B SULFATE 500; 1000 [USP'U]/G; [USP'U]/G
OINTMENT TOPICAL ONCE
OUTPATIENT
Start: 2024-02-29 | End: 2024-02-29

## 2024-02-29 RX ORDER — LIDOCAINE 50 MG/G
OINTMENT TOPICAL ONCE
OUTPATIENT
Start: 2024-02-29 | End: 2024-02-29

## 2024-02-29 RX ORDER — SODIUM CHLOR/HYPOCHLOROUS ACID 0.033 %
SOLUTION, IRRIGATION IRRIGATION ONCE
Status: COMPLETED | OUTPATIENT
Start: 2024-02-29 | End: 2024-02-29

## 2024-02-29 RX ORDER — GENTAMICIN SULFATE 1 MG/G
OINTMENT TOPICAL ONCE
OUTPATIENT
Start: 2024-02-29 | End: 2024-02-29

## 2024-02-29 RX ORDER — LIDOCAINE HYDROCHLORIDE 40 MG/ML
SOLUTION TOPICAL ONCE
OUTPATIENT
Start: 2024-02-29 | End: 2024-02-29

## 2024-02-29 RX ADMIN — LIDOCAINE HYDROCHLORIDE: 40 SOLUTION TOPICAL at 08:12

## 2024-02-29 RX ADMIN — Medication: at 08:46

## 2024-02-29 ASSESSMENT — PAIN SCALES - GENERAL: PAINLEVEL_OUTOF10: 4

## 2024-02-29 ASSESSMENT — PAIN DESCRIPTION - LOCATION: LOCATION: LEG

## 2024-02-29 ASSESSMENT — PAIN DESCRIPTION - FREQUENCY: FREQUENCY: CONTINUOUS

## 2024-02-29 ASSESSMENT — PAIN DESCRIPTION - DESCRIPTORS: DESCRIPTORS: BURNING

## 2024-02-29 ASSESSMENT — PAIN DESCRIPTION - PAIN TYPE: TYPE: CHRONIC PAIN

## 2024-02-29 ASSESSMENT — PAIN DESCRIPTION - ORIENTATION: ORIENTATION: RIGHT

## 2024-02-29 NOTE — PROGRESS NOTES
Multilayer Compression Wrap   (Not Unna) Below the Knee    NAME:  Justin Baeza  YOB: 1965  MEDICAL RECORD NUMBER:  [id number]  DATE:  2/29/2024       Removed old Multilayer wrap if present and washed leg with mild soap/water.   Applied moisturizing agent to dry skin as needed.    Applied primary and secondary dressing as ordered    Applied multilayered dressing below the knee to Right lower leg(s)  (4 Layer Compression Wrap ) .    Instructed patient/caregiver not to remove dressing and to keep it clean and dry.    Instructed patient/caregiver on complications to report to provider, such as pain, numbness in toes, heavy drainage, and slippage of dressing.    Instructed patient on purpose of compression dressing and on activity and exercise recommendations.   Applied per   Guidelines    Electronically signed by Mera Coelho RN on 2/29/2024 at 8:55 AM

## 2024-02-29 NOTE — PROGRESS NOTES
[facility]  Progress Note and Procedure Note      Justin Baeza  MEDICAL RECORD NUMBER:  [id number]  AGE: 58 y.o.   GENDER: male  : 1965  EPISODE DATE:  2024    Subjective:     Chief Complaint   Patient presents with    Wound Check     Right lower leg         HISTORY of PRESENT ILLNESS HPI     Justin Baeza is a 58 y.o. male who presents today for wound/ulcer evaluation.   History of Wound Context: Patient continues follow-up for chronic venous insufficiency with lymphedema and ulceration. There continues to be moderate drainage from his 4-layer compression wrap he has seen Dr. Agustin from infectious disease who agrees to place patient back on a course of IV Zosyn based on recent tissue culture reports done here in wound care. Patient once again admits to very little utilization of his lymphedema pumps. He is on his feet for protracted periods of time due to his multiple jobs.  There has been less drainage this past week according to the patient. patient states this is his last day of scheduled Zosyn IV infusion  Wound/Ulcer Pain Timing/Severity: none  Quality of pain: N/A  Severity:  0 / 10   Modifying Factors: None  Associated Signs/Symptoms: edema and drainage    Ulcer Identification:  Ulcer Type: venous    Contributing Factors: edema, venous stasis, lymphedema, and obesity    Acute Wound: N/A not an acute wound    PAST MEDICAL HISTORY        Diagnosis Date    DVT (deep venous thrombosis) (HCC)     Hx of blood clots     Pain and swelling of right lower leg        PAST SURGICAL HISTORY    Past Surgical History:   Procedure Laterality Date    BALLOON ANGIOPLASTY, ARTERY Right 2017    at [facility]    DILATATION, ESOPHAGUS      SKIN SPLIT GRAFT Right        FAMILY HISTORY    Family History   Problem Relation Age of Onset    Diabetes Mother     Heart Attack Father        SOCIAL HISTORY    Social History     Tobacco Use    Smoking status: Never    Smokeless tobacco: Never

## 2024-02-29 NOTE — PATIENT INSTRUCTIONS
Wound Care Center Physician Orders and Discharge Instructions  [facility]   [address]  Telephone: [phone] FAX [phone]  NAME:  Justin Baeza  YOB: 1965  MEDICAL RECORD NUMBER:  [id number]  DATE: 2/29/24     Return Appointment:  Return Appointment: With Khang Giles MD  in  1 Week(s)  [x] Return Appointment for a Wound Assessment with the nurse on:    Future Appointments   Date Time Provider Department Center   3/6/2024 12:00 PM Roberto Agustin MD [facility]   3/14/2024  9:15 AM [facility] MEDICATION MANAGEMENT [facility]         Orders Placed This Encounter   Procedures    Initiate Outpatient Wound Care Protocol       Home Care Company: none    Medically necessary services for evaluation and treatment: []Skilled Nursing (using clean technique) []PT (Eval & Treat) []OT (Eval & Treat) []Social Work []Dietician []Other:      Wound care instructions:  If you smoke we ask that you refrain from smoking. Smoking inhibits wounds from healing.  When taking antibiotics take the entire prescription as ordered. Do not stop taking until medication is all gone unless otherwise instructed.   Exercise as tolerated.   Keep weight off wounds and reposition every 2 hours if applicable.  Do not get wounds wet in the bath or shower unless otherwise instructed by your physician. If your wound is on your foot or leg, you may purchase a cast bag. Please ask at the pharmacy.  Wash hands with soap and water prior to and after every dressing change.    [x]Wash wounds with: Vashe wash - Apply enough Vashe to soak a piece of gauze and place on wound bed for 5-10 minutes. No need to rinse after soaking.  [x]Kiah wound Topical Treatments: Do not apply lotions, creams, or ointments to the skin around the wound bed unless directed as followed:   Apply around the wound: Zinc paste         [x]Wound Location: right lower posterior and medial leg  Apply

## 2024-03-01 ENCOUNTER — TELEPHONE (OUTPATIENT)
Dept: INFECTIOUS DISEASES | Age: 59
End: 2024-03-01

## 2024-03-01 NOTE — TELEPHONE ENCOUNTER
Called pt -  Seen by [facility] 2/29 and still with significant drainage.  Called [facility] and extended treatment 1 week - Zosyn 13.5 gm iv daily

## 2024-03-01 NOTE — TELEPHONE ENCOUNTER
Pt is calling asking if Dr Agustin spoke with Dr Giles regarding IV ATB extension  Will discuss with Dr Agustin

## 2024-03-01 NOTE — TELEPHONE ENCOUNTER
Cristina, [facility] nurse caring for patient is calling to notify this office of pt refusal for visit today to remove PICC due to appt with Dr Giles yesterday  Cristina states pt refused labs and dressing change earlier this week because, \"I am done with IV ATB so I do not need labs or the dressing changed.  You will be taking it out\"  Cristina is asking what she should do - pull the PICC or are we continuing the IV ATB?  Will discuss with ID providers and notify Cristina and the patient of plan

## 2024-03-05 LAB
ALBUMIN SERPL-MCNC: 4.4 G/DL (ref 3.4–5)
ALBUMIN/GLOB SERPL: 1.6 {RATIO} (ref 1.1–2.2)
ALP SERPL-CCNC: 82 U/L (ref 40–129)
ALT SERPL-CCNC: 26 U/L (ref 10–40)
ANION GAP SERPL CALCULATED.3IONS-SCNC: 13 MMOL/L (ref 3–16)
AST SERPL-CCNC: 29 U/L (ref 15–37)
BILIRUB SERPL-MCNC: 0.8 MG/DL (ref 0–1)
BUN SERPL-MCNC: 17 MG/DL (ref 7–20)
CALCIUM SERPL-MCNC: 8.5 MG/DL (ref 8.3–10.6)
CHLORIDE SERPL-SCNC: 107 MMOL/L (ref 99–110)
CO2 SERPL-SCNC: 22 MMOL/L (ref 21–32)
CREAT SERPL-MCNC: 1 MG/DL (ref 0.9–1.3)
CRP SERPL-MCNC: 38.1 MG/L (ref 0–5.1)
DEPRECATED RDW RBC AUTO: 17 % (ref 12.4–15.4)
ERYTHROCYTE [SEDIMENTATION RATE] IN BLOOD BY WESTERGREN METHOD: 15 MM/HR (ref 0–20)
GFR SERPLBLD CREATININE-BSD FMLA CKD-EPI: >60 ML/MIN/{1.73_M2}
GLUCOSE SERPL-MCNC: 80 MG/DL (ref 70–99)
HCT VFR BLD AUTO: 36.9 % (ref 40.5–52.5)
HGB BLD-MCNC: 12.4 G/DL (ref 13.5–17.5)
MCH RBC QN AUTO: 28.1 PG (ref 26–34)
MCHC RBC AUTO-ENTMCNC: 33.6 G/DL (ref 31–36)
MCV RBC AUTO: 83.7 FL (ref 80–100)
PLATELET # BLD AUTO: 151 K/UL (ref 135–450)
PMV BLD AUTO: 7.3 FL (ref 5–10.5)
POTASSIUM SERPL-SCNC: 4 MMOL/L (ref 3.5–5.1)
PROT SERPL-MCNC: 7.1 G/DL (ref 6.4–8.2)
RBC # BLD AUTO: 4.41 M/UL (ref 4.2–5.9)
SODIUM SERPL-SCNC: 142 MMOL/L (ref 136–145)
WBC # BLD AUTO: 4.2 K/UL (ref 4–11)

## 2024-03-06 ENCOUNTER — TELEMEDICINE (OUTPATIENT)
Dept: INFECTIOUS DISEASES | Age: 59
End: 2024-03-06
Payer: COMMERCIAL

## 2024-03-06 DIAGNOSIS — A49.8 PSEUDOMONAS INFECTION: ICD-10-CM

## 2024-03-06 DIAGNOSIS — L97.919 LEG ULCER, RIGHT, WITH UNSPECIFIED SEVERITY (HCC): ICD-10-CM

## 2024-03-06 DIAGNOSIS — L97.919 IDIOPATHIC CHRONIC VENOUS HYPERTENSION OF RIGHT LOWER EXTREMITY WITH ULCER (HCC): ICD-10-CM

## 2024-03-06 DIAGNOSIS — A49.01 STAPH AUREUS INFECTION: ICD-10-CM

## 2024-03-06 DIAGNOSIS — L03.115 CELLULITIS OF RIGHT LEG: Primary | ICD-10-CM

## 2024-03-06 DIAGNOSIS — I87.311 IDIOPATHIC CHRONIC VENOUS HYPERTENSION OF RIGHT LOWER EXTREMITY WITH ULCER (HCC): ICD-10-CM

## 2024-03-06 PROCEDURE — 99214 OFFICE O/P EST MOD 30 MIN: CPT | Performed by: INTERNAL MEDICINE

## 2024-03-06 PROCEDURE — 3017F COLORECTAL CA SCREEN DOC REV: CPT | Performed by: INTERNAL MEDICINE

## 2024-03-06 PROCEDURE — G8484 FLU IMMUNIZE NO ADMIN: HCPCS | Performed by: INTERNAL MEDICINE

## 2024-03-06 PROCEDURE — G8427 DOCREV CUR MEDS BY ELIG CLIN: HCPCS | Performed by: INTERNAL MEDICINE

## 2024-03-06 PROCEDURE — G8417 CALC BMI ABV UP PARAM F/U: HCPCS | Performed by: INTERNAL MEDICINE

## 2024-03-06 PROCEDURE — 1036F TOBACCO NON-USER: CPT | Performed by: INTERNAL MEDICINE

## 2024-03-06 NOTE — PROGRESS NOTES
Infectious Diseases Follow-up Note    Reason for Consult:   R leg wound infection  Requesting Physician:   Dr Giles  Primary Care Physician:  Keily Orozco MD  History Obtained From:   Patient, EPIC    CHIEF COMPLAINT:      Chief Complaint   Patient presents with    Follow-up     OPAT   Sees Dr Giles tomorrow   Pt states foot looks better       HISTORY OF PRESENT ILLNESS:      MOST RECENT VISIT ON TOP:    3/6/24 Office visit / Follow-up    Justin Baeza, was evaluated through a synchronous (real-time) audio-video encounter. The patient (or guardian if applicable) is aware that this is a billable service, which includes applicable co-pays. This Virtual Visit was conducted with patient's (and/or legal guardian's) consent. Patient identification was verified, and a caregiver was present when appropriate.   The patient was located: in car  Provider was located at Facility [facility] [address] [place], [place] [id number]     Total time spent for this encounter: 30 min    Pt is taking and tolerating iv Zosyn - 13.5 gm by CI  Pt reports R leg ' much better' -  less drainage and odor  Wound Care Center f/u tomorrow - 3/7/24    2/21/24 Office visit / Follow-up    Justin Baeza, was evaluated through a synchronous (real-time) audio-video encounter. The patient (or guardian if applicable) is aware that this is a billable service, which includes applicable co-pays. This Virtual Visit was conducted with patient's (and/or legal guardian's) consent. Patient identification was verified, and a caregiver was present when appropriate.   The patient was located: in car  Provider was located at Facility (Appt Dept): [address] [place] [address]  [address] [place] [address]     Total time spent for this encounter:  30 min    Pt is taking and tolerating iv Zosyn - 13.5 gm by CI  Pt reports R leg 'better' has less drainage and odor  Wound Care Center f/u on 1/2/24 2/6/23 Office Visit -     Justin Baeza, was

## 2024-03-07 ENCOUNTER — HOSPITAL ENCOUNTER (OUTPATIENT)
Dept: WOUND CARE | Age: 59
Discharge: HOME | End: 2024-03-07
Attending: SPECIALIST
Payer: COMMERCIAL

## 2024-03-07 VITALS
SYSTOLIC BLOOD PRESSURE: 149 MMHG | DIASTOLIC BLOOD PRESSURE: 76 MMHG | HEART RATE: 70 BPM | TEMPERATURE: 97 F | RESPIRATION RATE: 16 BRPM

## 2024-03-07 DIAGNOSIS — I87.331 IDIOPATHIC CHRONIC VENOUS HYPERTENSION OF RIGHT LOWER EXTREMITY WITH ULCER AND INFLAMMATION (HCC): ICD-10-CM

## 2024-03-07 DIAGNOSIS — I89.0 CHRONIC ACQUIRED LYMPHEDEMA: Primary | ICD-10-CM

## 2024-03-07 DIAGNOSIS — L97.909 VENOUS ULCER (HCC): ICD-10-CM

## 2024-03-07 DIAGNOSIS — Z91.199 NONCOMPLIANCE: ICD-10-CM

## 2024-03-07 DIAGNOSIS — I83.009 VENOUS ULCER (HCC): ICD-10-CM

## 2024-03-07 DIAGNOSIS — I89.0 LYMPHEDEMA: ICD-10-CM

## 2024-03-07 PROCEDURE — 29581 APPL MULTLAYER CMPRN SYS LEG: CPT

## 2024-03-07 PROCEDURE — 6370000000 HC RX 637 (ALT 250 FOR IP): Performed by: SPECIALIST

## 2024-03-07 PROCEDURE — 11042 DBRDMT SUBQ TIS 1ST 20SQCM/<: CPT

## 2024-03-07 PROCEDURE — 11042 DBRDMT SUBQ TIS 1ST 20SQCM/<: CPT | Performed by: SPECIALIST

## 2024-03-07 RX ORDER — LIDOCAINE 50 MG/G
OINTMENT TOPICAL ONCE
OUTPATIENT
Start: 2024-03-07 | End: 2024-03-07

## 2024-03-07 RX ORDER — GENTAMICIN SULFATE 1 MG/G
OINTMENT TOPICAL ONCE
OUTPATIENT
Start: 2024-03-07 | End: 2024-03-07

## 2024-03-07 RX ORDER — LIDOCAINE HYDROCHLORIDE 40 MG/ML
SOLUTION TOPICAL ONCE
OUTPATIENT
Start: 2024-03-07 | End: 2024-03-07

## 2024-03-07 RX ORDER — LIDOCAINE HYDROCHLORIDE 20 MG/ML
JELLY TOPICAL ONCE
OUTPATIENT
Start: 2024-03-07 | End: 2024-03-07

## 2024-03-07 RX ORDER — LIDOCAINE HYDROCHLORIDE 40 MG/ML
SOLUTION TOPICAL ONCE
Status: COMPLETED | OUTPATIENT
Start: 2024-03-07 | End: 2024-03-07

## 2024-03-07 RX ORDER — BACITRACIN ZINC AND POLYMYXIN B SULFATE 500; 1000 [USP'U]/G; [USP'U]/G
OINTMENT TOPICAL ONCE
OUTPATIENT
Start: 2024-03-07 | End: 2024-03-07

## 2024-03-07 RX ORDER — TRIAMCINOLONE ACETONIDE 1 MG/G
OINTMENT TOPICAL ONCE
OUTPATIENT
Start: 2024-03-07 | End: 2024-03-07

## 2024-03-07 RX ORDER — BETAMETHASONE DIPROPIONATE 0.05 %
OINTMENT (GRAM) TOPICAL ONCE
OUTPATIENT
Start: 2024-03-07 | End: 2024-03-07

## 2024-03-07 RX ORDER — GINSENG 100 MG
CAPSULE ORAL ONCE
OUTPATIENT
Start: 2024-03-07 | End: 2024-03-07

## 2024-03-07 RX ORDER — SODIUM CHLOR/HYPOCHLOROUS ACID 0.033 %
SOLUTION, IRRIGATION IRRIGATION ONCE
OUTPATIENT
Start: 2024-03-07 | End: 2024-03-07

## 2024-03-07 RX ORDER — IBUPROFEN 200 MG
TABLET ORAL ONCE
OUTPATIENT
Start: 2024-03-07 | End: 2024-03-07

## 2024-03-07 RX ORDER — LIDOCAINE 40 MG/G
CREAM TOPICAL ONCE
OUTPATIENT
Start: 2024-03-07 | End: 2024-03-07

## 2024-03-07 RX ORDER — CLOBETASOL PROPIONATE 0.5 MG/G
OINTMENT TOPICAL ONCE
OUTPATIENT
Start: 2024-03-07 | End: 2024-03-07

## 2024-03-07 RX ADMIN — LIDOCAINE HYDROCHLORIDE: 40 SOLUTION TOPICAL at 08:33

## 2024-03-07 ASSESSMENT — PAIN DESCRIPTION - ORIENTATION: ORIENTATION: RIGHT

## 2024-03-07 ASSESSMENT — PAIN DESCRIPTION - FREQUENCY: FREQUENCY: CONTINUOUS

## 2024-03-07 ASSESSMENT — PAIN SCALES - GENERAL: PAINLEVEL_OUTOF10: 3

## 2024-03-07 ASSESSMENT — PAIN DESCRIPTION - LOCATION: LOCATION: LEG

## 2024-03-07 ASSESSMENT — PAIN DESCRIPTION - DESCRIPTORS: DESCRIPTORS: BURNING

## 2024-03-07 ASSESSMENT — PAIN DESCRIPTION - PAIN TYPE: TYPE: CHRONIC PAIN

## 2024-03-07 NOTE — PATIENT INSTRUCTIONS
Wound Care Center Physician Orders and Discharge Instructions  [facility]   [address]  Telephone: [phone] FAX [phone]  NAME:  Justin Baeza  YOB: 1965  MEDICAL RECORD NUMBER:  [id number]  DATE: 3/7/2024      Return Appointment:  Return Appointment: With Khang Giles MD  in  1 Week(s)  [x] Return Appointment for a Wound Assessment with the nurse on: Monday March 11th    Future Appointments   Date Time Provider Department Center   3/14/2024  9:15 AM [facility] MEDICATION MANAGEMENT [facility]         Orders Placed This Encounter   Procedures    Initiate Outpatient Wound Care Protocol       Home Care Company: none    Medically necessary services for evaluation and treatment: []Skilled Nursing (using clean technique) []PT (Eval & Treat) []OT (Eval & Treat) []Social Work []Dietician []Other:      Wound care instructions:  If you smoke we ask that you refrain from smoking. Smoking inhibits wounds from healing.  When taking antibiotics take the entire prescription as ordered. Do not stop taking until medication is all gone unless otherwise instructed.   Exercise as tolerated.   Keep weight off wounds and reposition every 2 hours if applicable.  Do not get wounds wet in the bath or shower unless otherwise instructed by your physician. If your wound is on your foot or leg, you may purchase a cast bag. Please ask at the pharmacy.  Wash hands with soap and water prior to and after every dressing change.    [x]Wash wounds with: No need to wash. Leave dressing in place.    [x]Kiah wound Topical Treatments: Do not apply lotions, creams, or ointments to the skin around the wound bed unless directed as followed:   Apply around the wound: Zinc paste         [x]Wound Location: right lower posterior and medial leg  Apply Primary Dressing to wound: Foam with silver (I.e. Polymem Ag)  Secondary Dressing: Extra absorbant pad   Avoid contact of tape

## 2024-03-07 NOTE — PROGRESS NOTES
Left message for patient to return call. (1st. Attempt.)    Nurse note:  When patient returns call. Notified patient, Dr. Mame Loera, recommended extending prednisone 20 mg daily x 2 weeks. Ask patient what pharmacy she wants medication sent to.
Multilayer Compression Wrap   (Not Unna) Below the Knee    NAME:  Justin Baeza  YOB: 1965  MEDICAL RECORD NUMBER:  [id number]  DATE:  3/7/2024       Removed old Multilayer wrap if present and washed leg with mild soap/water.   Applied moisturizing agent to dry skin as needed.    Applied primary and secondary dressing as ordered    Applied multilayered dressing below the knee to Right lower leg(s)  (4 Layer Compression Wrap ) .    Instructed patient/caregiver not to remove dressing and to keep it clean and dry.    Instructed patient/caregiver on complications to report to provider, such as pain, numbness in toes, heavy drainage, and slippage of dressing.    Instructed patient on purpose of compression dressing and on activity and exercise recommendations.   Applied per   Guidelines    Electronically signed by Mera Coelho RN on 3/7/2024 at 8:49 AM    
[employer] [] Other:    Your nurse  is:  talisha     Electronically signed by Edgar Nunez RN on 3/7/2024 at 8:38 AM     Wound Care Center Information: Should you experience any significant changes in your wound(s) or have questions about your wound care, please contact the [facility] Wound Care Center at [phone].   Hours of operation:  Mon:  8AM - 2PM  Tue: 11AM - 5PM  Wed: CLOSED  Thur: 8AM - 4:30PM  Fri:  8AM - 4:30PM  The office is closed on all major holidays.    Please give us 24-48 business hours to return your call.  These hours of operation are subject to change. If you need help with your wounds and cannot wait until we are available, contact your PCP or go to your preferred emergency room.     Call your doctor now or seek immediate medical care if:    You have symptoms of infection, such as:  Increased pain, swelling, warmth, or redness.  Red streaks leading from the area.  Pus draining from the area.  A fever.         Electronically signed by CHICO VELIZ MD on 3/7/2024 at 9:13 AM

## 2024-03-11 ENCOUNTER — TELEPHONE (OUTPATIENT)
Dept: INFECTIOUS DISEASES | Age: 59
End: 2024-03-11

## 2024-03-11 NOTE — TELEPHONE ENCOUNTER
Per Dr Agustin verbal, pt is finished with IV ATB and PICC can be removed    OPAT End  Call made to pharmacy, spoke with Margareth, pharmacist at [facility] and gave verbal order to end IV ATB and remove PICC    Call made to [facility], spoke with Nelson at [facility] and gave verbal order to remove PICC    Call made to patient and notified him he is finished with IV ATB and [facility] will be contacting him to set up a time to come and remove his PICC. Pt verbalized understanding    Will f/u in 1-2 days to verify line removal    3/15/24  Spoke with pt  Picc line has been removed

## 2024-03-14 ENCOUNTER — HOSPITAL ENCOUNTER (OUTPATIENT)
Dept: WOUND CARE | Age: 59
Discharge: HOME | End: 2024-03-14
Attending: SPECIALIST
Payer: COMMERCIAL

## 2024-03-14 ENCOUNTER — ANTI-COAG VISIT (OUTPATIENT)
Dept: PHARMACY | Age: 59
End: 2024-03-14
Payer: COMMERCIAL

## 2024-03-14 VITALS
RESPIRATION RATE: 16 BRPM | SYSTOLIC BLOOD PRESSURE: 147 MMHG | TEMPERATURE: 97.4 F | DIASTOLIC BLOOD PRESSURE: 83 MMHG | HEART RATE: 73 BPM

## 2024-03-14 DIAGNOSIS — I82.5Z1 CHRONIC VENOUS EMBOLISM AND THROMBOSIS OF DEEP VESSELS OF DISTAL END OF RIGHT LOWER EXTREMITY (HCC): Primary | ICD-10-CM

## 2024-03-14 DIAGNOSIS — I89.0 LYMPHEDEMA: ICD-10-CM

## 2024-03-14 DIAGNOSIS — Z91.199 NONCOMPLIANCE: ICD-10-CM

## 2024-03-14 DIAGNOSIS — I89.0 CHRONIC ACQUIRED LYMPHEDEMA: Primary | ICD-10-CM

## 2024-03-14 DIAGNOSIS — I83.009 VENOUS ULCER (HCC): ICD-10-CM

## 2024-03-14 DIAGNOSIS — L97.909 VENOUS ULCER (HCC): ICD-10-CM

## 2024-03-14 DIAGNOSIS — I87.331 IDIOPATHIC CHRONIC VENOUS HYPERTENSION OF RIGHT LOWER EXTREMITY WITH ULCER AND INFLAMMATION (HCC): ICD-10-CM

## 2024-03-14 LAB — INTERNATIONAL NORMALIZATION RATIO, POC: 2.6

## 2024-03-14 PROCEDURE — 6370000000 HC RX 637 (ALT 250 FOR IP): Performed by: SPECIALIST

## 2024-03-14 PROCEDURE — 11042 DBRDMT SUBQ TIS 1ST 20SQCM/<: CPT

## 2024-03-14 PROCEDURE — 11045 DBRDMT SUBQ TISS EACH ADDL: CPT

## 2024-03-14 PROCEDURE — 11042 DBRDMT SUBQ TIS 1ST 20SQCM/<: CPT | Performed by: SPECIALIST

## 2024-03-14 PROCEDURE — 11045 DBRDMT SUBQ TISS EACH ADDL: CPT | Performed by: SPECIALIST

## 2024-03-14 PROCEDURE — 99211 OFF/OP EST MAY X REQ PHY/QHP: CPT

## 2024-03-14 PROCEDURE — 85610 PROTHROMBIN TIME: CPT

## 2024-03-14 PROCEDURE — 29581 APPL MULTLAYER CMPRN SYS LEG: CPT

## 2024-03-14 RX ORDER — GENTAMICIN SULFATE 1 MG/G
OINTMENT TOPICAL ONCE
OUTPATIENT
Start: 2024-03-14 | End: 2024-03-14

## 2024-03-14 RX ORDER — LIDOCAINE 40 MG/G
CREAM TOPICAL ONCE
OUTPATIENT
Start: 2024-03-14 | End: 2024-03-14

## 2024-03-14 RX ORDER — BETAMETHASONE DIPROPIONATE 0.05 %
OINTMENT (GRAM) TOPICAL ONCE
OUTPATIENT
Start: 2024-03-14 | End: 2024-03-14

## 2024-03-14 RX ORDER — LIDOCAINE 50 MG/G
OINTMENT TOPICAL ONCE
OUTPATIENT
Start: 2024-03-14 | End: 2024-03-14

## 2024-03-14 RX ORDER — GINSENG 100 MG
CAPSULE ORAL ONCE
OUTPATIENT
Start: 2024-03-14 | End: 2024-03-14

## 2024-03-14 RX ORDER — LIDOCAINE HYDROCHLORIDE 20 MG/ML
JELLY TOPICAL ONCE
OUTPATIENT
Start: 2024-03-14 | End: 2024-03-14

## 2024-03-14 RX ORDER — BACITRACIN ZINC AND POLYMYXIN B SULFATE 500; 1000 [USP'U]/G; [USP'U]/G
OINTMENT TOPICAL ONCE
OUTPATIENT
Start: 2024-03-14 | End: 2024-03-14

## 2024-03-14 RX ORDER — CLOBETASOL PROPIONATE 0.5 MG/G
OINTMENT TOPICAL ONCE
OUTPATIENT
Start: 2024-03-14 | End: 2024-03-14

## 2024-03-14 RX ORDER — LIDOCAINE HYDROCHLORIDE 40 MG/ML
SOLUTION TOPICAL ONCE
Status: COMPLETED | OUTPATIENT
Start: 2024-03-14 | End: 2024-03-14

## 2024-03-14 RX ORDER — IBUPROFEN 200 MG
TABLET ORAL ONCE
OUTPATIENT
Start: 2024-03-14 | End: 2024-03-14

## 2024-03-14 RX ORDER — LIDOCAINE HYDROCHLORIDE 40 MG/ML
SOLUTION TOPICAL ONCE
OUTPATIENT
Start: 2024-03-14 | End: 2024-03-14

## 2024-03-14 RX ORDER — TRIAMCINOLONE ACETONIDE 1 MG/G
OINTMENT TOPICAL ONCE
OUTPATIENT
Start: 2024-03-14 | End: 2024-03-14

## 2024-03-14 RX ORDER — SODIUM CHLOR/HYPOCHLOROUS ACID 0.033 %
SOLUTION, IRRIGATION IRRIGATION ONCE
OUTPATIENT
Start: 2024-03-14 | End: 2024-03-14

## 2024-03-14 RX ADMIN — LIDOCAINE HYDROCHLORIDE: 40 SOLUTION TOPICAL at 08:38

## 2024-03-14 ASSESSMENT — PAIN DESCRIPTION - DESCRIPTORS: DESCRIPTORS: BURNING

## 2024-03-14 ASSESSMENT — PAIN DESCRIPTION - LOCATION: LOCATION: LEG

## 2024-03-14 ASSESSMENT — PAIN SCALES - GENERAL: PAINLEVEL_OUTOF10: 2

## 2024-03-14 ASSESSMENT — PAIN DESCRIPTION - PAIN TYPE: TYPE: CHRONIC PAIN

## 2024-03-14 ASSESSMENT — PAIN DESCRIPTION - ORIENTATION: ORIENTATION: RIGHT

## 2024-03-14 ASSESSMENT — PAIN DESCRIPTION - FREQUENCY: FREQUENCY: CONTINUOUS

## 2024-03-14 NOTE — PROGRESS NOTES
[facility]  Progress Note and Procedure Note      Justin Baeza  MEDICAL RECORD NUMBER:  [id number]  AGE: 58 y.o.   GENDER: male  : 1965  EPISODE DATE:  3/14/2024    Subjective:     Chief Complaint   Patient presents with    Wound Check     Right lower leg           HISTORY of PRESENT ILLNESS HPI     Justin Baeza is a 58 y.o. male who presents today for wound/ulcer evaluation.   History of Wound Context:  Patient continues follow-up for chronic venous insufficiency with lymphedema and ulceration. There continues to be moderate drainage from his 4-layer compression wrap he has seen Dr. Agustin from infectious disease who agrees to place patient back on a course of IV Zosyn based on recent tissue culture reports done here in wound care. Patient once again admits to very little utilization of his lymphedema pumps. He is on his feet for protracted periods of time due to his multiple jobs.  He is now off antibiotics  Wound/Ulcer Pain Timing/Severity: none  Quality of pain: N/A  Severity:  0 / 10   Modifying Factors: None  Associated Signs/Symptoms: edema and drainage    Ulcer Identification:  Ulcer Type: venous    Contributing Factors: edema, venous stasis, lymphedema, obesity, and anticoagulation therapy    Acute Wound: N/A not an acute wound    PAST MEDICAL HISTORY        Diagnosis Date    DVT (deep venous thrombosis) (HCC)     Hx of blood clots     Pain and swelling of right lower leg        PAST SURGICAL HISTORY    Past Surgical History:   Procedure Laterality Date    BALLOON ANGIOPLASTY, ARTERY Right 2017    at [facility]    DILATATION, ESOPHAGUS      SKIN SPLIT GRAFT Right        FAMILY HISTORY    Family History   Problem Relation Age of Onset    Diabetes Mother     Heart Attack Father        SOCIAL HISTORY    Social History     Tobacco Use    Smoking status: Never    Smokeless tobacco: Never   Vaping Use    Vaping Use: Never used   Substance Use Topics    Alcohol use: No

## 2024-03-14 NOTE — PROGRESS NOTES
Multilayer Compression Wrap   (Not Unna) Below the Knee    NAME:  Justin Baeza  YOB: 1965  MEDICAL RECORD NUMBER:  [id number]  DATE:  3/14/2024       Removed old Multilayer wrap if present and washed leg with mild soap/water.   Applied moisturizing agent to dry skin as needed.    Applied primary and secondary dressing as ordered    Applied multilayered dressing below the knee to Right lower leg(s)  (4 Layer Compression Wrap ) .    Instructed patient/caregiver not to remove dressing and to keep it clean and dry.    Instructed patient/caregiver on complications to report to provider, such as pain, numbness in toes, heavy drainage, and slippage of dressing.    Instructed patient on purpose of compression dressing and on activity and exercise recommendations.   Applied per   Guidelines    Electronically signed by Kathy Gomez RN on 3/14/2024 at 8:50 AM

## 2024-03-14 NOTE — PATIENT INSTRUCTIONS
care, please contact the [facility] Wound Care Center at [phone].   Hours of operation:  Mon:  8AM - 2PM  Tue: 11AM - 5PM  Wed: CLOSED  Thur: 8AM - 4:30PM  Fri:  8AM - 4:30PM  The office is closed on all major holidays.    Please give us 24-48 business hours to return your call.  These hours of operation are subject to change. If you need help with your wounds and cannot wait until we are available, contact your PCP or go to your preferred emergency room.     Call your doctor now or seek immediate medical care if:    You have symptoms of infection, such as:  Increased pain, swelling, warmth, or redness.  Red streaks leading from the area.  Pus draining from the area.  A fever.

## 2024-03-21 ENCOUNTER — HOSPITAL ENCOUNTER (OUTPATIENT)
Dept: WOUND CARE | Age: 59
Discharge: HOME | End: 2024-03-21
Attending: SPECIALIST
Payer: COMMERCIAL

## 2024-03-21 VITALS
TEMPERATURE: 97 F | RESPIRATION RATE: 16 BRPM | SYSTOLIC BLOOD PRESSURE: 137 MMHG | DIASTOLIC BLOOD PRESSURE: 96 MMHG | HEART RATE: 73 BPM

## 2024-03-21 DIAGNOSIS — Z91.199 NONCOMPLIANCE: ICD-10-CM

## 2024-03-21 DIAGNOSIS — I89.0 LYMPHEDEMA: ICD-10-CM

## 2024-03-21 DIAGNOSIS — I87.331 IDIOPATHIC CHRONIC VENOUS HYPERTENSION OF RIGHT LOWER EXTREMITY WITH ULCER AND INFLAMMATION (HCC): ICD-10-CM

## 2024-03-21 DIAGNOSIS — I89.0 CHRONIC ACQUIRED LYMPHEDEMA: Primary | ICD-10-CM

## 2024-03-21 DIAGNOSIS — I83.009 VENOUS ULCER (HCC): ICD-10-CM

## 2024-03-21 DIAGNOSIS — L97.909 VENOUS ULCER (HCC): ICD-10-CM

## 2024-03-21 PROCEDURE — 11042 DBRDMT SUBQ TIS 1ST 20SQCM/<: CPT | Performed by: SPECIALIST

## 2024-03-21 PROCEDURE — 29581 APPL MULTLAYER CMPRN SYS LEG: CPT

## 2024-03-21 PROCEDURE — 6370000000 HC RX 637 (ALT 250 FOR IP): Performed by: SPECIALIST

## 2024-03-21 PROCEDURE — 11042 DBRDMT SUBQ TIS 1ST 20SQCM/<: CPT

## 2024-03-21 RX ORDER — LIDOCAINE HYDROCHLORIDE 20 MG/ML
JELLY TOPICAL ONCE
OUTPATIENT
Start: 2024-03-21 | End: 2024-03-21

## 2024-03-21 RX ORDER — SODIUM CHLOR/HYPOCHLOROUS ACID 0.033 %
SOLUTION, IRRIGATION IRRIGATION ONCE
OUTPATIENT
Start: 2024-03-21 | End: 2024-03-21

## 2024-03-21 RX ORDER — BACITRACIN ZINC AND POLYMYXIN B SULFATE 500; 1000 [USP'U]/G; [USP'U]/G
OINTMENT TOPICAL ONCE
OUTPATIENT
Start: 2024-03-21 | End: 2024-03-21

## 2024-03-21 RX ORDER — LIDOCAINE 50 MG/G
OINTMENT TOPICAL ONCE
OUTPATIENT
Start: 2024-03-21 | End: 2024-03-21

## 2024-03-21 RX ORDER — LIDOCAINE 40 MG/G
CREAM TOPICAL ONCE
OUTPATIENT
Start: 2024-03-21 | End: 2024-03-21

## 2024-03-21 RX ORDER — CLOBETASOL PROPIONATE 0.5 MG/G
OINTMENT TOPICAL ONCE
OUTPATIENT
Start: 2024-03-21 | End: 2024-03-21

## 2024-03-21 RX ORDER — GINSENG 100 MG
CAPSULE ORAL ONCE
OUTPATIENT
Start: 2024-03-21 | End: 2024-03-21

## 2024-03-21 RX ORDER — TRIAMCINOLONE ACETONIDE 1 MG/G
OINTMENT TOPICAL ONCE
OUTPATIENT
Start: 2024-03-21 | End: 2024-03-21

## 2024-03-21 RX ORDER — LIDOCAINE HYDROCHLORIDE 40 MG/ML
SOLUTION TOPICAL ONCE
OUTPATIENT
Start: 2024-03-21 | End: 2024-03-21

## 2024-03-21 RX ORDER — BETAMETHASONE DIPROPIONATE 0.05 %
OINTMENT (GRAM) TOPICAL ONCE
OUTPATIENT
Start: 2024-03-21 | End: 2024-03-21

## 2024-03-21 RX ORDER — IBUPROFEN 200 MG
TABLET ORAL ONCE
OUTPATIENT
Start: 2024-03-21 | End: 2024-03-21

## 2024-03-21 RX ORDER — SODIUM CHLOR/HYPOCHLOROUS ACID 0.033 %
SOLUTION, IRRIGATION IRRIGATION ONCE
Status: COMPLETED | OUTPATIENT
Start: 2024-03-21 | End: 2024-03-21

## 2024-03-21 RX ORDER — LIDOCAINE HYDROCHLORIDE 40 MG/ML
SOLUTION TOPICAL ONCE
Status: COMPLETED | OUTPATIENT
Start: 2024-03-21 | End: 2024-03-21

## 2024-03-21 RX ORDER — GENTAMICIN SULFATE 1 MG/G
OINTMENT TOPICAL ONCE
OUTPATIENT
Start: 2024-03-21 | End: 2024-03-21

## 2024-03-21 RX ADMIN — LIDOCAINE HYDROCHLORIDE: 40 SOLUTION TOPICAL at 08:09

## 2024-03-21 RX ADMIN — Medication: at 08:41

## 2024-03-21 ASSESSMENT — PAIN DESCRIPTION - FREQUENCY: FREQUENCY: CONTINUOUS

## 2024-03-21 ASSESSMENT — PAIN DESCRIPTION - PAIN TYPE: TYPE: CHRONIC PAIN

## 2024-03-21 ASSESSMENT — PAIN SCALES - GENERAL: PAINLEVEL_OUTOF10: 2

## 2024-03-21 ASSESSMENT — PAIN DESCRIPTION - DESCRIPTORS: DESCRIPTORS: BURNING

## 2024-03-21 ASSESSMENT — PAIN DESCRIPTION - ORIENTATION: ORIENTATION: LEFT

## 2024-03-21 ASSESSMENT — PAIN DESCRIPTION - LOCATION: LOCATION: LEG

## 2024-03-21 NOTE — PATIENT INSTRUCTIONS
or have questions about your wound care, please contact the [facility] Center at [phone].   Hours of operation:  Mon:  8AM - 2PM  Tue: 11AM - 5PM  Wed: CLOSED  Thur: 8AM - 4:30PM  Fri:  8AM - 4:30PM  The office is closed on all major holidays.    Please give us 24-48 business hours to return your call.  These hours of operation are subject to change. If you need help with your wounds and cannot wait until we are available, contact your PCP or go to your preferred emergency room.     Call your doctor now or seek immediate medical care if:    You have symptoms of infection, such as:  Increased pain, swelling, warmth, or redness.  Red streaks leading from the area.  Pus draining from the area.  A fever.

## 2024-03-21 NOTE — PROGRESS NOTES
Multilayer Compression Wrap   (Not Unna) Below the Knee    NAME:  Justin Baeza  YOB: 1965  MEDICAL RECORD NUMBER:  [id number]  DATE:  3/21/2024       Removed old Multilayer wrap if present and washed leg with mild soap/water.   Applied moisturizing agent to dry skin as needed.    Applied primary and secondary dressing as ordered    Applied multilayered dressing below the knee to Right lower leg(s)  (4 Layer Compression Wrap ) .    Instructed patient/caregiver not to remove dressing and to keep it clean and dry.    Instructed patient/caregiver on complications to report to provider, such as pain, numbness in toes, heavy drainage, and slippage of dressing.    Instructed patient on purpose of compression dressing and on activity and exercise recommendations.   Applied per   Guidelines    Electronically signed by Kathy Gomez RN on 3/21/2024 at 8:46 AM      
  Wound Image   03/07/24 0812   Wound Etiology Venous 07/25/22 1040   Dressing Status New dressing applied 02/08/24 0843   Wound Cleansed Vashe 03/21/24 0832   Dressing/Treatment Foam impregnated with Ag;ABD 03/21/24 0844   Wound Length (cm) 2.4 cm 03/21/24 0810   Wound Width (cm) 2.3 cm 03/21/24 0810   Wound Depth (cm) 0.1 cm 03/21/24 0810   Wound Surface Area (cm^2) 5.52 cm^2 03/21/24 0810   Change in Wound Size % (l*w) 87.45 03/21/24 0810   Wound Volume (cm^3) 0.552 cm^3 03/21/24 0810   Wound Healing % 87 03/21/24 0810   Post-Procedure Length (cm) 2.5 cm 03/21/24 0832   Post-Procedure Width (cm) 2.4 cm 03/21/24 0832   Post-Procedure Depth (cm) 0.3 cm 03/21/24 0832   Post-Procedure Surface Area (cm^2) 6 cm^2 03/21/24 0832   Post-Procedure Volume (cm^3) 1.8 cm^3 03/21/24 0832   Distance Tunneling (cm) 0 cm 03/14/24 0823   Tunneling Position ___ O'Clock 0 01/11/24 0817   Undermining Starts ___ O'Clock 0 01/11/24 0817   Undermining Ends___ O'Clock 0 01/11/24 0817   Undermining Maxium Distance (cm) 0 03/14/24 0823   Wound Assessment Pink/red;Fibrin 03/21/24 0810   Drainage Amount Moderate (25-50%) 03/21/24 0810   Drainage Description Green;Brown;Purulent 03/21/24 0810   Odor Mild 03/21/24 0810   Kiah-wound Assessment Intact;Maceration;Erosion 03/21/24 0810   Margins Defined edges 03/21/24 0810   Wound Thickness Description not for Pressure Injury Full thickness 03/21/24 0810   Number of days: 604       Wound 01/04/24 Ankle Right;Medial #4 (Active)   Wound Image   03/07/24 0812   Wound Etiology Venous 01/04/24 0807   Dressing Status New dressing applied 02/08/24 0843   Wound Cleansed Vashe 03/21/24 0832   Dressing/Treatment Collagen 03/21/24 0844   Wound Length (cm) 1.3 cm 03/21/24 0810   Wound Width (cm) 1 cm 03/21/24 0810   Wound Depth (cm) 0.1 cm 03/21/24 0810   Wound Surface Area (cm^2) 1.3 cm^2 03/21/24 0810   Change in Wound Size % (l*w) 95.67 03/21/24 0810   Wound Volume (cm^3) 0.13 cm^3 03/21/24 0810   Wound

## 2024-03-28 ENCOUNTER — HOSPITAL ENCOUNTER (OUTPATIENT)
Dept: WOUND CARE | Age: 59
Discharge: HOME | End: 2024-03-28
Attending: SPECIALIST
Payer: COMMERCIAL

## 2024-03-28 VITALS
DIASTOLIC BLOOD PRESSURE: 83 MMHG | SYSTOLIC BLOOD PRESSURE: 145 MMHG | TEMPERATURE: 97 F | RESPIRATION RATE: 16 BRPM | HEART RATE: 71 BPM

## 2024-03-28 DIAGNOSIS — L97.909 VENOUS ULCER (HCC): ICD-10-CM

## 2024-03-28 DIAGNOSIS — I83.009 VENOUS ULCER (HCC): ICD-10-CM

## 2024-03-28 DIAGNOSIS — I89.0 LYMPHEDEMA: ICD-10-CM

## 2024-03-28 DIAGNOSIS — I89.0 CHRONIC ACQUIRED LYMPHEDEMA: Primary | ICD-10-CM

## 2024-03-28 DIAGNOSIS — I87.331 IDIOPATHIC CHRONIC VENOUS HYPERTENSION OF RIGHT LOWER EXTREMITY WITH ULCER AND INFLAMMATION (HCC): ICD-10-CM

## 2024-03-28 DIAGNOSIS — Z91.199 NONCOMPLIANCE: ICD-10-CM

## 2024-03-28 PROCEDURE — 87205 SMEAR GRAM STAIN: CPT

## 2024-03-28 PROCEDURE — 6370000000 HC RX 637 (ALT 250 FOR IP): Performed by: SPECIALIST

## 2024-03-28 PROCEDURE — 87070 CULTURE OTHR SPECIMN AEROBIC: CPT

## 2024-03-28 PROCEDURE — 11042 DBRDMT SUBQ TIS 1ST 20SQCM/<: CPT | Performed by: SPECIALIST

## 2024-03-28 PROCEDURE — 11042 DBRDMT SUBQ TIS 1ST 20SQCM/<: CPT

## 2024-03-28 PROCEDURE — 29581 APPL MULTLAYER CMPRN SYS LEG: CPT

## 2024-03-28 RX ORDER — BACITRACIN ZINC AND POLYMYXIN B SULFATE 500; 1000 [USP'U]/G; [USP'U]/G
OINTMENT TOPICAL ONCE
OUTPATIENT
Start: 2024-03-28 | End: 2024-03-28

## 2024-03-28 RX ORDER — CLOBETASOL PROPIONATE 0.5 MG/G
OINTMENT TOPICAL ONCE
OUTPATIENT
Start: 2024-03-28 | End: 2024-03-28

## 2024-03-28 RX ORDER — SODIUM CHLOR/HYPOCHLOROUS ACID 0.033 %
SOLUTION, IRRIGATION IRRIGATION ONCE
OUTPATIENT
Start: 2024-03-28 | End: 2024-03-28

## 2024-03-28 RX ORDER — LIDOCAINE HYDROCHLORIDE 20 MG/ML
JELLY TOPICAL ONCE
OUTPATIENT
Start: 2024-03-28 | End: 2024-03-28

## 2024-03-28 RX ORDER — SODIUM CHLOR/HYPOCHLOROUS ACID 0.033 %
SOLUTION, IRRIGATION IRRIGATION ONCE
Status: COMPLETED | OUTPATIENT
Start: 2024-03-28 | End: 2024-03-28

## 2024-03-28 RX ORDER — LIDOCAINE 50 MG/G
OINTMENT TOPICAL ONCE
OUTPATIENT
Start: 2024-03-28 | End: 2024-03-28

## 2024-03-28 RX ORDER — GENTAMICIN SULFATE 1 MG/G
OINTMENT TOPICAL ONCE
OUTPATIENT
Start: 2024-03-28 | End: 2024-03-28

## 2024-03-28 RX ORDER — IBUPROFEN 200 MG
TABLET ORAL ONCE
OUTPATIENT
Start: 2024-03-28 | End: 2024-03-28

## 2024-03-28 RX ORDER — LIDOCAINE 40 MG/G
CREAM TOPICAL ONCE
OUTPATIENT
Start: 2024-03-28 | End: 2024-03-28

## 2024-03-28 RX ORDER — TRIAMCINOLONE ACETONIDE 1 MG/G
OINTMENT TOPICAL ONCE
OUTPATIENT
Start: 2024-03-28 | End: 2024-03-28

## 2024-03-28 RX ORDER — CLINDAMYCIN HYDROCHLORIDE 300 MG/1
300 CAPSULE ORAL 3 TIMES DAILY
Qty: 30 CAPSULE | Refills: 0 | Status: SHIPPED | OUTPATIENT
Start: 2024-03-28 | End: 2024-04-07

## 2024-03-28 RX ORDER — LIDOCAINE HYDROCHLORIDE 40 MG/ML
SOLUTION TOPICAL ONCE
OUTPATIENT
Start: 2024-03-28 | End: 2024-03-28

## 2024-03-28 RX ORDER — LIDOCAINE HYDROCHLORIDE 40 MG/ML
SOLUTION TOPICAL ONCE
Status: COMPLETED | OUTPATIENT
Start: 2024-03-28 | End: 2024-03-28

## 2024-03-28 RX ORDER — BETAMETHASONE DIPROPIONATE 0.05 %
OINTMENT (GRAM) TOPICAL ONCE
OUTPATIENT
Start: 2024-03-28 | End: 2024-03-28

## 2024-03-28 RX ORDER — GINSENG 100 MG
CAPSULE ORAL ONCE
OUTPATIENT
Start: 2024-03-28 | End: 2024-03-28

## 2024-03-28 RX ADMIN — LIDOCAINE HYDROCHLORIDE: 40 SOLUTION TOPICAL at 08:29

## 2024-03-28 RX ADMIN — Medication: at 08:44

## 2024-03-28 ASSESSMENT — PAIN DESCRIPTION - PAIN TYPE: TYPE: CHRONIC PAIN

## 2024-03-28 ASSESSMENT — PAIN DESCRIPTION - FREQUENCY: FREQUENCY: CONTINUOUS

## 2024-03-28 ASSESSMENT — PAIN DESCRIPTION - LOCATION: LOCATION: LEG

## 2024-03-28 ASSESSMENT — PAIN DESCRIPTION - ORIENTATION: ORIENTATION: RIGHT

## 2024-03-28 ASSESSMENT — PAIN DESCRIPTION - DESCRIPTORS: DESCRIPTORS: BURNING

## 2024-03-28 ASSESSMENT — PAIN SCALES - GENERAL: PAINLEVEL_OUTOF10: 3

## 2024-03-28 NOTE — PROGRESS NOTES
[facility] Wound Care [facility]  Progress Note and Procedure Note      Justin Baeza  MEDICAL RECORD NUMBER:  [id number]  AGE: 58 y.o.   GENDER: male  : 1965  EPISODE DATE:  3/28/2024    Subjective:     Chief Complaint   Patient presents with    Wound Check     Right lower leg         HISTORY of PRESENT ILLNESS HPI     Justin Baeza is a 58 y.o. male who presents today for wound/ulcer evaluation.   History of Wound Context: Patient continues follow-up for chronic venous insufficiency with lymphedema and ulceration. There continues to be moderate drainage from his 4-layer compression wrap he has seen Dr. Agustin from infectious disease who agrees to place patient back on a course of IV Zosyn based on recent tissue culture reports done here in wound care. Patient once again admits to very little utilization of his lymphedema pumps. He is on his feet for protracted periods of time due to his multiple jobs.  He is now off antibiotics.  Patient complains of increasing odorous drainage this past week  Wound/Ulcer Pain Timing/Severity: none  Quality of pain: N/A  Severity:  0 / 10   Modifying Factors: None  Associated Signs/Symptoms: edema, drainage, and odor    Ulcer Identification:  Ulcer Type: venous    Contributing Factors: edema, venous stasis, lymphedema, obesity, and anticoagulation therapy    Acute Wound: N/A not an acute wound    PAST MEDICAL HISTORY        Diagnosis Date    DVT (deep venous thrombosis) (HCC)     Hx of blood clots     Pain and swelling of right lower leg        PAST SURGICAL HISTORY    Past Surgical History:   Procedure Laterality Date    BALLOON ANGIOPLASTY, ARTERY Right 2017    at [facility]    DILATATION, ESOPHAGUS      SKIN SPLIT GRAFT Right        FAMILY HISTORY    Family History   Problem Relation Age of Onset    Diabetes Mother     Heart Attack Father        SOCIAL HISTORY    Social History     Tobacco Use    Smoking status: Never    Smokeless tobacco: Never   Vaping Use

## 2024-03-28 NOTE — PATIENT INSTRUCTIONS
Wound Care Center Physician Orders and Discharge Instructions  [facility]   [address]  Telephone: [phone] FAX [phone]  NAME:  Justin Baeza  YOB: 1965  MEDICAL RECORD NUMBER:  [id number]  DATE: 3/28/2024      Return Appointment:  Return Appointment: With Khang Giles MD  in  1 Week(s)  [x] Return Appointment for a Wound Assessment with the nurse on:     Future Appointments   Date Time Provider Department Center   4/24/2024  1:30 PM [facility] MEDICATION MANAGEMENT [facility]         Orders Placed This Encounter   Procedures    Initiate Outpatient Wound Care Protocol       Home Care Company: none    Medically necessary services for evaluation and treatment: []Skilled Nursing (using clean technique) []PT (Eval & Treat) []OT (Eval & Treat) []Social Work []Dietician []Other:      Wound care instructions:  If you smoke we ask that you refrain from smoking. Smoking inhibits wounds from healing.  When taking antibiotics take the entire prescription as ordered. Do not stop taking until medication is all gone unless otherwise instructed.   Exercise as tolerated.   Keep weight off wounds and reposition every 2 hours if applicable.  Do not get wounds wet in the bath or shower unless otherwise instructed by your physician. If your wound is on your foot or leg, you may purchase a cast bag. Please ask at the pharmacy.  Wash hands with soap and water prior to and after every dressing change.    [x]Wash wounds with: No need to wash. Leave dressing in place.    [x]Kiah wound Topical Treatments: Do not apply lotions, creams, or ointments to the skin around the wound bed unless directed as followed:   Apply around the wound: Zinc paste  - Dr. Giles applied in clinic       [x]Wound Location: right lower leg wounds  Apply Primary Dressing to wound: Foam with silver (I.e. Polymem Ag)  Secondary Dressing: Extra absorbant pad   Avoid contact of tape

## 2024-03-28 NOTE — PROGRESS NOTES
.Multilayer Compression Wrap   (Not Unna) Below the Knee    NAME:  Justin Baeza  YOB: 1965  MEDICAL RECORD NUMBER:  [id number]  DATE:  3/28/2024       Removed old Multilayer wrap if present and washed leg with mild soap/water.   Applied moisturizing agent to dry skin as needed.    Applied primary and secondary dressing as ordered    Applied multilayered dressing below the knee to Right lower leg(s)  (4 Layer Compression Wrap ) .    Instructed patient/caregiver not to remove dressing and to keep it clean and dry.    Instructed patient/caregiver on complications to report to provider, such as pain, numbness in toes, heavy drainage, and slippage of dressing.    Instructed patient on purpose of compression dressing and on activity and exercise recommendations.   Applied per   Guidelines    Electronically signed by Kathy Gomez RN on 3/28/2024 at 8:56 AM

## 2024-04-04 ENCOUNTER — HOSPITAL ENCOUNTER (OUTPATIENT)
Dept: WOUND CARE | Age: 59
Discharge: HOME | End: 2024-04-04
Attending: SPECIALIST
Payer: COMMERCIAL

## 2024-04-04 VITALS
TEMPERATURE: 96.8 F | RESPIRATION RATE: 16 BRPM | DIASTOLIC BLOOD PRESSURE: 74 MMHG | HEART RATE: 76 BPM | SYSTOLIC BLOOD PRESSURE: 130 MMHG

## 2024-04-04 DIAGNOSIS — I87.331 IDIOPATHIC CHRONIC VENOUS HYPERTENSION OF RIGHT LOWER EXTREMITY WITH ULCER AND INFLAMMATION (HCC): ICD-10-CM

## 2024-04-04 DIAGNOSIS — I83.009 VENOUS ULCER (HCC): ICD-10-CM

## 2024-04-04 DIAGNOSIS — Z91.199 NONCOMPLIANCE: ICD-10-CM

## 2024-04-04 DIAGNOSIS — I89.0 LYMPHEDEMA: ICD-10-CM

## 2024-04-04 DIAGNOSIS — I89.0 CHRONIC ACQUIRED LYMPHEDEMA: Primary | ICD-10-CM

## 2024-04-04 DIAGNOSIS — L97.909 VENOUS ULCER (HCC): ICD-10-CM

## 2024-04-04 PROCEDURE — 29581 APPL MULTLAYER CMPRN SYS LEG: CPT

## 2024-04-04 PROCEDURE — 11045 DBRDMT SUBQ TISS EACH ADDL: CPT

## 2024-04-04 PROCEDURE — 11042 DBRDMT SUBQ TIS 1ST 20SQCM/<: CPT

## 2024-04-04 PROCEDURE — 6370000000 HC RX 637 (ALT 250 FOR IP): Performed by: SPECIALIST

## 2024-04-04 RX ORDER — CLOBETASOL PROPIONATE 0.5 MG/G
OINTMENT TOPICAL ONCE
OUTPATIENT
Start: 2024-04-04 | End: 2024-04-04

## 2024-04-04 RX ORDER — SODIUM CHLOR/HYPOCHLOROUS ACID 0.033 %
SOLUTION, IRRIGATION IRRIGATION ONCE
OUTPATIENT
Start: 2024-04-04 | End: 2024-04-04

## 2024-04-04 RX ORDER — LIDOCAINE HYDROCHLORIDE 20 MG/ML
JELLY TOPICAL ONCE
OUTPATIENT
Start: 2024-04-04 | End: 2024-04-04

## 2024-04-04 RX ORDER — LIDOCAINE HYDROCHLORIDE 40 MG/ML
SOLUTION TOPICAL ONCE
OUTPATIENT
Start: 2024-04-04 | End: 2024-04-04

## 2024-04-04 RX ORDER — LIDOCAINE 40 MG/G
CREAM TOPICAL ONCE
OUTPATIENT
Start: 2024-04-04 | End: 2024-04-04

## 2024-04-04 RX ORDER — BETAMETHASONE DIPROPIONATE 0.05 %
OINTMENT (GRAM) TOPICAL ONCE
OUTPATIENT
Start: 2024-04-04 | End: 2024-04-04

## 2024-04-04 RX ORDER — BACITRACIN ZINC AND POLYMYXIN B SULFATE 500; 1000 [USP'U]/G; [USP'U]/G
OINTMENT TOPICAL ONCE
OUTPATIENT
Start: 2024-04-04 | End: 2024-04-04

## 2024-04-04 RX ORDER — LIDOCAINE HYDROCHLORIDE 40 MG/ML
SOLUTION TOPICAL ONCE
Status: COMPLETED | OUTPATIENT
Start: 2024-04-04 | End: 2024-04-04

## 2024-04-04 RX ORDER — SODIUM CHLOR/HYPOCHLOROUS ACID 0.033 %
SOLUTION, IRRIGATION IRRIGATION ONCE
Status: COMPLETED | OUTPATIENT
Start: 2024-04-04 | End: 2024-04-04

## 2024-04-04 RX ORDER — LIDOCAINE 50 MG/G
OINTMENT TOPICAL ONCE
OUTPATIENT
Start: 2024-04-04 | End: 2024-04-04

## 2024-04-04 RX ORDER — GINSENG 100 MG
CAPSULE ORAL ONCE
OUTPATIENT
Start: 2024-04-04 | End: 2024-04-04

## 2024-04-04 RX ORDER — TRIAMCINOLONE ACETONIDE 1 MG/G
OINTMENT TOPICAL ONCE
OUTPATIENT
Start: 2024-04-04 | End: 2024-04-04

## 2024-04-04 RX ORDER — GENTAMICIN SULFATE 1 MG/G
OINTMENT TOPICAL ONCE
OUTPATIENT
Start: 2024-04-04 | End: 2024-04-04

## 2024-04-04 RX ORDER — IBUPROFEN 200 MG
TABLET ORAL ONCE
OUTPATIENT
Start: 2024-04-04 | End: 2024-04-04

## 2024-04-04 RX ADMIN — LIDOCAINE HYDROCHLORIDE: 40 SOLUTION TOPICAL at 08:32

## 2024-04-04 RX ADMIN — Medication: at 08:46

## 2024-04-04 ASSESSMENT — PAIN DESCRIPTION - DESCRIPTORS: DESCRIPTORS: BURNING

## 2024-04-04 ASSESSMENT — PAIN SCALES - GENERAL: PAINLEVEL_OUTOF10: 3

## 2024-04-04 ASSESSMENT — PAIN DESCRIPTION - FREQUENCY: FREQUENCY: CONTINUOUS

## 2024-04-04 ASSESSMENT — PAIN DESCRIPTION - ORIENTATION: ORIENTATION: RIGHT

## 2024-04-04 ASSESSMENT — PAIN DESCRIPTION - PAIN TYPE: TYPE: CHRONIC PAIN

## 2024-04-04 ASSESSMENT — PAIN DESCRIPTION - LOCATION: LOCATION: LEG

## 2024-04-04 NOTE — PATIENT INSTRUCTIONS
Wound Care Center Physician Orders and Discharge Instructions  [facility] Wound [facility] Center   [address]  Telephone: [phone] FAX [phone]  NAME:  Justin aBeza  YOB: 1965  MEDICAL RECORD NUMBER:  [id number]  DATE: 4/4/2024      Return Appointment:  Return Appointment: With Khang Giles MD  in  1 Week(s)  [x] Return Appointment for a Wound Assessment with the nurse on:  Monday on 4/8/24    Future Appointments   Date Time Provider Department Center   4/24/2024  1:30 PM [facility] MEDICATION MANAGEMENT [facility]         Orders Placed This Encounter   Procedures    Initiate Outpatient Wound Care Protocol       Home Care Company: none    Medically necessary services for evaluation and treatment: []Skilled Nursing (using clean technique) []PT (Eval & Treat) []OT (Eval & Treat) []Social Work []Dietician []Other:      Wound care instructions:  If you smoke we ask that you refrain from smoking. Smoking inhibits wounds from healing.  When taking antibiotics take the entire prescription as ordered. Do not stop taking until medication is all gone unless otherwise instructed.   Exercise as tolerated.   Keep weight off wounds and reposition every 2 hours if applicable.  Do not get wounds wet in the bath or shower unless otherwise instructed by your physician. If your wound is on your foot or leg, you may purchase a cast bag. Please ask at the pharmacy.  Wash hands with soap and water prior to and after every dressing change.    [x]Wash wounds with: No need to wash. Leave dressing in place.    [x]Kiah wound Topical Treatments: Do not apply lotions, creams, or ointments to the skin around the wound bed unless directed as followed:   Apply around the wound: nothing      [x]Wound Location: right lower leg wounds  Apply Primary Dressing to wound: Foam with silver (I.e. Polymem Ag)  Secondary Dressing: Extra absorbant pad   Avoid contact of tape with skin if

## 2024-04-04 NOTE — PROGRESS NOTES
[facility] Wound Care Center  Progress Note and Procedure Note      Justin Baeza  MEDICAL RECORD NUMBER:  [id number]  AGE: 58 y.o.   GENDER: male  : 1965  EPISODE DATE:  2024    Subjective:     Chief Complaint   Patient presents with    Wound Check     Right lower leg           HISTORY of PRESENT ILLNESS HPI     Justin Baeza is a 58 y.o. male who presents today for wound/ulcer evaluation.   History of Wound Context: Patient continues follow-up for chronic venous insufficiency with lymphedema and ulceration. There continues to be moderate drainage from his 4-layer compression wrap he has seen Dr. Agustin from infectious disease who agrees to place patient back on a course of IV Zosyn based on recent tissue culture reports done here in wound care. Patient once again admits to very little utilization of his lymphedema pumps. He is on his feet for protracted periods of time due to his multiple jobs.  He is now off antibiotics.  Patient complains of increasing odorous drainage this past week.  Patient has been out of town this past week.  Recent tissue culture once again contains polymicrobial organisms  Wound/Ulcer Pain Timing/Severity: none  Quality of pain: N/A  Severity:  0 / 10   Modifying Factors: None  Associated Signs/Symptoms: edema, drainage, and odor    Ulcer Identification:  Ulcer Type: venous    Contributing Factors: edema, venous stasis, lymphedema, obesity, and anticoagulation therapy    Acute Wound: N/A not an acute wound    PAST MEDICAL HISTORY        Diagnosis Date    DVT (deep venous thrombosis) (HCC)     Hx of blood clots     Pain and swelling of right lower leg        PAST SURGICAL HISTORY    Past Surgical History:   Procedure Laterality Date    BALLOON ANGIOPLASTY, ARTERY Right 2017    at [facility]    DILATATION, ESOPHAGUS      SKIN SPLIT GRAFT Right        FAMILY HISTORY    Family History   Problem Relation Age of Onset    Diabetes Mother     Heart Attack Father

## 2024-04-04 NOTE — PLAN OF CARE
Multilayer Compression Wrap   (Not Unna) Below the Knee    NAME:  Justin Baeza  YOB: 1965  MEDICAL RECORD NUMBER:  [id number]  DATE:  4/4/2024       Removed old Multilayer wrap if present and washed leg with mild soap/water.   Applied moisturizing agent to dry skin as needed.    Applied primary and secondary dressing as ordered    Applied multilayered dressing below the knee to Right lower leg(s)  (4 Layer Compression Wrap ) .    Instructed patient/caregiver not to remove dressing and to keep it clean and dry.    Instructed patient/caregiver on complications to report to provider, such as pain, numbness in toes, heavy drainage, and slippage of dressing.    Instructed patient on purpose of compression dressing and on activity and exercise recommendations.   Applied per   Guidelines    Electronically signed by Edgar Nunez RN on 4/4/2024 at 9:12 AM

## 2024-04-08 ENCOUNTER — HOSPITAL ENCOUNTER (OUTPATIENT)
Dept: WOUND CARE | Age: 59
Discharge: HOME | End: 2024-04-08
Attending: SPECIALIST
Payer: COMMERCIAL

## 2024-04-08 VITALS
RESPIRATION RATE: 16 BRPM | SYSTOLIC BLOOD PRESSURE: 142 MMHG | HEART RATE: 82 BPM | DIASTOLIC BLOOD PRESSURE: 76 MMHG | TEMPERATURE: 97 F

## 2024-04-08 PROCEDURE — 29581 APPL MULTLAYER CMPRN SYS LEG: CPT

## 2024-04-08 ASSESSMENT — PAIN DESCRIPTION - PAIN TYPE: TYPE: CHRONIC PAIN

## 2024-04-08 ASSESSMENT — PAIN DESCRIPTION - DESCRIPTORS: DESCRIPTORS: BURNING

## 2024-04-08 ASSESSMENT — PAIN DESCRIPTION - ORIENTATION: ORIENTATION: RIGHT

## 2024-04-08 ASSESSMENT — PAIN DESCRIPTION - FREQUENCY: FREQUENCY: CONTINUOUS

## 2024-04-08 ASSESSMENT — PAIN SCALES - GENERAL: PAINLEVEL_OUTOF10: 4

## 2024-04-08 ASSESSMENT — PAIN DESCRIPTION - LOCATION: LOCATION: LEG

## 2024-04-09 ENCOUNTER — TELEPHONE (OUTPATIENT)
Dept: INFECTIOUS DISEASES | Age: 59
End: 2024-04-09

## 2024-04-09 NOTE — TELEPHONE ENCOUNTER
Recurrent L leg infection      Hx L leg lymphedema   Recurrent wound infections, cult Ps aeruginosa (R cipro), E faecalis, MSSA  Last rx with iv Zosyn 2/6 - 3/8    Pt now has worsening wound with increase drainage and odor  Called by VELMA Lares C  Called pt, verified sx  Discussed placing PICC and restarting iv rx  Call interrupted / dropped    Will discuss with pt tomorrow

## 2024-04-10 ENCOUNTER — TELEPHONE (OUTPATIENT)
Dept: INFECTIOUS DISEASES | Age: 59
End: 2024-04-10

## 2024-04-10 RX ORDER — AMOXICILLIN AND CLAVULANATE POTASSIUM 875; 125 MG/1; MG/1
1 TABLET, FILM COATED ORAL 2 TIMES DAILY
Qty: 28 TABLET | Refills: 2 | Status: SHIPPED | OUTPATIENT
Start: 2024-04-10 | End: 2024-04-24

## 2024-04-10 NOTE — TELEPHONE ENCOUNTER
See message from 4/9 --  Discussed options, iv / po     Try oral Augmentin 875 po bid x 1-2 weeks  Will cover MSSA, E faecalis, not Ps aeruginosa  Cont with wound care and compression

## 2024-04-11 ENCOUNTER — HOSPITAL ENCOUNTER (OUTPATIENT)
Dept: WOUND CARE | Age: 59
Discharge: HOME | End: 2024-04-11
Attending: SPECIALIST
Payer: COMMERCIAL

## 2024-04-11 VITALS
HEART RATE: 77 BPM | RESPIRATION RATE: 16 BRPM | TEMPERATURE: 97.3 F | DIASTOLIC BLOOD PRESSURE: 81 MMHG | SYSTOLIC BLOOD PRESSURE: 122 MMHG

## 2024-04-11 DIAGNOSIS — I89.0 LYMPHEDEMA: ICD-10-CM

## 2024-04-11 DIAGNOSIS — I87.331 IDIOPATHIC CHRONIC VENOUS HYPERTENSION OF RIGHT LOWER EXTREMITY WITH ULCER AND INFLAMMATION (HCC): ICD-10-CM

## 2024-04-11 DIAGNOSIS — L97.909 VENOUS ULCER (HCC): ICD-10-CM

## 2024-04-11 DIAGNOSIS — Z91.199 NONCOMPLIANCE: ICD-10-CM

## 2024-04-11 DIAGNOSIS — I89.0 CHRONIC ACQUIRED LYMPHEDEMA: Primary | ICD-10-CM

## 2024-04-11 DIAGNOSIS — I83.009 VENOUS ULCER (HCC): ICD-10-CM

## 2024-04-11 PROCEDURE — 11042 DBRDMT SUBQ TIS 1ST 20SQCM/<: CPT

## 2024-04-11 PROCEDURE — 6370000000 HC RX 637 (ALT 250 FOR IP): Performed by: SPECIALIST

## 2024-04-11 PROCEDURE — 11042 DBRDMT SUBQ TIS 1ST 20SQCM/<: CPT | Performed by: SPECIALIST

## 2024-04-11 PROCEDURE — 11045 DBRDMT SUBQ TISS EACH ADDL: CPT

## 2024-04-11 PROCEDURE — 29581 APPL MULTLAYER CMPRN SYS LEG: CPT

## 2024-04-11 PROCEDURE — 11045 DBRDMT SUBQ TISS EACH ADDL: CPT | Performed by: SPECIALIST

## 2024-04-11 RX ORDER — LIDOCAINE HYDROCHLORIDE 20 MG/ML
JELLY TOPICAL ONCE
OUTPATIENT
Start: 2024-04-11 | End: 2024-04-11

## 2024-04-11 RX ORDER — LIDOCAINE HYDROCHLORIDE 40 MG/ML
SOLUTION TOPICAL ONCE
OUTPATIENT
Start: 2024-04-11 | End: 2024-04-11

## 2024-04-11 RX ORDER — BETAMETHASONE DIPROPIONATE 0.05 %
OINTMENT (GRAM) TOPICAL ONCE
OUTPATIENT
Start: 2024-04-11 | End: 2024-04-11

## 2024-04-11 RX ORDER — LIDOCAINE 50 MG/G
OINTMENT TOPICAL ONCE
OUTPATIENT
Start: 2024-04-11 | End: 2024-04-11

## 2024-04-11 RX ORDER — LIDOCAINE HYDROCHLORIDE 40 MG/ML
SOLUTION TOPICAL ONCE
Status: COMPLETED | OUTPATIENT
Start: 2024-04-11 | End: 2024-04-11

## 2024-04-11 RX ORDER — SODIUM CHLOR/HYPOCHLOROUS ACID 0.033 %
SOLUTION, IRRIGATION IRRIGATION ONCE
OUTPATIENT
Start: 2024-04-11 | End: 2024-04-11

## 2024-04-11 RX ORDER — IBUPROFEN 200 MG
TABLET ORAL ONCE
OUTPATIENT
Start: 2024-04-11 | End: 2024-04-11

## 2024-04-11 RX ORDER — SODIUM CHLOR/HYPOCHLOROUS ACID 0.033 %
SOLUTION, IRRIGATION IRRIGATION ONCE
Status: COMPLETED | OUTPATIENT
Start: 2024-04-11 | End: 2024-04-11

## 2024-04-11 RX ORDER — BACITRACIN ZINC AND POLYMYXIN B SULFATE 500; 1000 [USP'U]/G; [USP'U]/G
OINTMENT TOPICAL ONCE
OUTPATIENT
Start: 2024-04-11 | End: 2024-04-11

## 2024-04-11 RX ORDER — GINSENG 100 MG
CAPSULE ORAL ONCE
OUTPATIENT
Start: 2024-04-11 | End: 2024-04-11

## 2024-04-11 RX ORDER — TRIAMCINOLONE ACETONIDE 1 MG/G
OINTMENT TOPICAL ONCE
OUTPATIENT
Start: 2024-04-11 | End: 2024-04-11

## 2024-04-11 RX ORDER — CLOBETASOL PROPIONATE 0.5 MG/G
OINTMENT TOPICAL ONCE
OUTPATIENT
Start: 2024-04-11 | End: 2024-04-11

## 2024-04-11 RX ORDER — GENTAMICIN SULFATE 1 MG/G
OINTMENT TOPICAL ONCE
OUTPATIENT
Start: 2024-04-11 | End: 2024-04-11

## 2024-04-11 RX ORDER — LIDOCAINE 40 MG/G
CREAM TOPICAL ONCE
OUTPATIENT
Start: 2024-04-11 | End: 2024-04-11

## 2024-04-11 RX ADMIN — LIDOCAINE HYDROCHLORIDE: 40 SOLUTION TOPICAL at 08:15

## 2024-04-11 RX ADMIN — Medication: at 09:07

## 2024-04-11 ASSESSMENT — PAIN DESCRIPTION - DESCRIPTORS: DESCRIPTORS: BURNING

## 2024-04-11 ASSESSMENT — PAIN SCALES - GENERAL: PAINLEVEL_OUTOF10: 6

## 2024-04-11 ASSESSMENT — PAIN DESCRIPTION - ORIENTATION: ORIENTATION: RIGHT

## 2024-04-11 ASSESSMENT — PAIN DESCRIPTION - FREQUENCY: FREQUENCY: CONTINUOUS

## 2024-04-11 ASSESSMENT — PAIN DESCRIPTION - PAIN TYPE: TYPE: CHRONIC PAIN

## 2024-04-11 NOTE — PATIENT INSTRUCTIONS
Wound Care Center Physician Orders and Discharge Instructions  [facility]   [address]  Telephone: [phone] FAX [phone]  NAME:  Justin Baeza  YOB: 1965  MEDICAL RECORD NUMBER:  [id number]  DATE: 4/11/2024      Return Appointment:  Return Appointment: With Khang Giles MD  in  1 Week(s)  [x] Return Appointment for a Wound Assessment with the nurse on:  Monday on 4/15/24    Future Appointments   Date Time Provider Department Center   4/24/2024  1:30 PM [facility] MEDICATION MANAGEMENT [facility]         Orders Placed This Encounter   Procedures    Initiate Outpatient Wound Care Protocol       Home Care Company: none    Medically necessary services for evaluation and treatment: []Skilled Nursing (using clean technique) []PT (Eval & Treat) []OT (Eval & Treat) []Social Work []Dietician []Other:      Wound care instructions:  If you smoke we ask that you refrain from smoking. Smoking inhibits wounds from healing.  When taking antibiotics take the entire prescription as ordered. Do not stop taking until medication is all gone unless otherwise instructed.   Exercise as tolerated.   Keep weight off wounds and reposition every 2 hours if applicable.  Do not get wounds wet in the bath or shower unless otherwise instructed by your physician. If your wound is on your foot or leg, you may purchase a cast bag. Please ask at the pharmacy.  Wash hands with soap and water prior to and after every dressing change.    [x]Wash wounds with: No need to wash. Leave dressing in place.    [x]Kiah wound Topical Treatments: Do not apply lotions, creams, or ointments to the skin around the wound bed unless directed as followed:   Apply around the wound: nothing      [x]Wound Location: right lower leg wounds  Apply Primary Dressing to wound: Foam with silver (I.e. Polymem Ag)  Secondary Dressing: Extra absorbant pad   Avoid contact of tape with skin if

## 2024-04-11 NOTE — PROGRESS NOTES
[facility]  Progress Note and Procedure Note      Justin Baeza  MEDICAL RECORD NUMBER:  [id number]  AGE: 58 y.o.   GENDER: male  : 1965  EPISODE DATE:  2024    Subjective:     Chief Complaint   Patient presents with    Wound Check     Right lower leg         HISTORY of PRESENT ILLNESS HPI     Justin Baeza is a 58 y.o. male who presents today for wound/ulcer evaluation.   History of Wound Context: Patient continues follow-up for chronic venous insufficiency with lymphedema and ulceration. There continues to be moderate drainage from his 4-layer compression wrap he has seen Dr. Agustin from infectious disease who agrees to place patient back on a course of IV Zosyn based on recent tissue culture reports done here in wound care. Patient once again admits to very little utilization of his lymphedema pumps. He is on his feet for protracted periods of time due to his multiple jobs.  He is now off antibiotics.  Patient complains of increasing odorous drainage this past week.  Patient has been out of town this past week.  Recent tissue culture once again contains polymicrobial organism.  Once again Dr. Agustin has seen most recent tissue culture results and has placed patient on Augmentin 875 mg twice daily  Wound/Ulcer Pain Timing/Severity: none  Quality of pain: N/A  Severity:  0 / 10   Modifying Factors: None  Associated Signs/Symptoms: edema, drainage, and odor    Ulcer Identification:  Ulcer Type: venous    Contributing Factors: edema, venous stasis, lymphedema, obesity, and anticoagulation therapy    Acute Wound: N/A not an acute wound    PAST MEDICAL HISTORY        Diagnosis Date    DVT (deep venous thrombosis) (HCC)     Hx of blood clots     Pain and swelling of right lower leg        PAST SURGICAL HISTORY    Past Surgical History:   Procedure Laterality Date    BALLOON ANGIOPLASTY, ARTERY Right 2017    at [facility]    DILATATION, ESOPHAGUS      SKIN SPLIT GRAFT Right

## 2024-04-11 NOTE — PROGRESS NOTES
Multilayer Compression Wrap   (Not Unna) Below the Knee    NAME:  Justin Baeza  YOB: 1965  MEDICAL RECORD NUMBER:  [id number]  DATE:  4/11/2024       Removed old Multilayer wrap if present and washed leg with mild soap/water.   Applied moisturizing agent to dry skin as needed.    Applied primary and secondary dressing as ordered    Applied multilayered dressing below the knee to Right lower leg(s)  (4 Layer Compression Wrap ) .    Instructed patient/caregiver not to remove dressing and to keep it clean and dry.    Instructed patient/caregiver on complications to report to provider, such as pain, numbness in toes, heavy drainage, and slippage of dressing.    Instructed patient on purpose of compression dressing and on activity and exercise recommendations.   Applied per   Guidelines    Electronically signed by Mera Coelho RN on 4/11/2024 at 9:18 AM

## 2024-04-15 ENCOUNTER — HOSPITAL ENCOUNTER (OUTPATIENT)
Dept: WOUND CARE | Age: 59
Discharge: HOME | End: 2024-04-15
Attending: SPECIALIST
Payer: COMMERCIAL

## 2024-04-15 VITALS
HEART RATE: 74 BPM | RESPIRATION RATE: 16 BRPM | DIASTOLIC BLOOD PRESSURE: 77 MMHG | TEMPERATURE: 97.5 F | SYSTOLIC BLOOD PRESSURE: 116 MMHG

## 2024-04-15 PROCEDURE — 29581 APPL MULTLAYER CMPRN SYS LEG: CPT

## 2024-04-15 ASSESSMENT — PAIN DESCRIPTION - LOCATION: LOCATION: LEG

## 2024-04-15 ASSESSMENT — PAIN DESCRIPTION - ORIENTATION: ORIENTATION: RIGHT

## 2024-04-15 ASSESSMENT — PAIN DESCRIPTION - DESCRIPTORS: DESCRIPTORS: BURNING

## 2024-04-15 ASSESSMENT — PAIN SCALES - GENERAL: PAINLEVEL_OUTOF10: 4

## 2024-04-15 NOTE — PROGRESS NOTES
Multilayer Compression Wrap   (Not Unna) Below the Knee    NAME:  Justin Baeza  YOB: 1965  MEDICAL RECORD NUMBER:  [id number]  DATE:  4/15/2024       Removed old Multilayer wrap if present and washed leg with mild soap/water.   Applied moisturizing agent to dry skin as needed.    Applied primary and secondary dressing as ordered    Applied multilayered dressing below the knee to Right lower leg(s)  (4 Layer Compression Wrap ) .    Instructed patient/caregiver not to remove dressing and to keep it clean and dry.    Instructed patient/caregiver on complications to report to provider, such as pain, numbness in toes, heavy drainage, and slippage of dressing.    Instructed patient on purpose of compression dressing and on activity and exercise recommendations.   Applied per   Guidelines    Electronically signed by Mera Coelho RN on 4/15/2024 at 8:28 AM

## 2024-04-18 ENCOUNTER — HOSPITAL ENCOUNTER (OUTPATIENT)
Dept: WOUND CARE | Age: 59
Discharge: HOME | End: 2024-04-18
Attending: SPECIALIST
Payer: COMMERCIAL

## 2024-04-18 VITALS
RESPIRATION RATE: 18 BRPM | DIASTOLIC BLOOD PRESSURE: 76 MMHG | SYSTOLIC BLOOD PRESSURE: 131 MMHG | TEMPERATURE: 97.7 F | HEART RATE: 64 BPM

## 2024-04-18 DIAGNOSIS — I89.0 CHRONIC ACQUIRED LYMPHEDEMA: Primary | ICD-10-CM

## 2024-04-18 DIAGNOSIS — L97.909 VENOUS ULCER (HCC): ICD-10-CM

## 2024-04-18 DIAGNOSIS — Z91.199 NONCOMPLIANCE: ICD-10-CM

## 2024-04-18 DIAGNOSIS — I87.331 IDIOPATHIC CHRONIC VENOUS HYPERTENSION OF RIGHT LOWER EXTREMITY WITH ULCER AND INFLAMMATION (HCC): ICD-10-CM

## 2024-04-18 DIAGNOSIS — I83.009 VENOUS ULCER (HCC): ICD-10-CM

## 2024-04-18 DIAGNOSIS — I89.0 LYMPHEDEMA: ICD-10-CM

## 2024-04-18 PROCEDURE — 6370000000 HC RX 637 (ALT 250 FOR IP): Performed by: SPECIALIST

## 2024-04-18 RX ORDER — LIDOCAINE HYDROCHLORIDE 40 MG/ML
SOLUTION TOPICAL ONCE
Status: COMPLETED | OUTPATIENT
Start: 2024-04-18 | End: 2024-04-18

## 2024-04-18 RX ORDER — LIDOCAINE HYDROCHLORIDE 20 MG/ML
JELLY TOPICAL ONCE
OUTPATIENT
Start: 2024-04-18 | End: 2024-04-18

## 2024-04-18 RX ORDER — LIDOCAINE 40 MG/G
CREAM TOPICAL ONCE
OUTPATIENT
Start: 2024-04-18 | End: 2024-04-18

## 2024-04-18 RX ORDER — BETAMETHASONE DIPROPIONATE 0.05 %
OINTMENT (GRAM) TOPICAL ONCE
OUTPATIENT
Start: 2024-04-18 | End: 2024-04-18

## 2024-04-18 RX ORDER — LIDOCAINE HYDROCHLORIDE 40 MG/ML
SOLUTION TOPICAL ONCE
OUTPATIENT
Start: 2024-04-18 | End: 2024-04-18

## 2024-04-18 RX ORDER — BACITRACIN ZINC AND POLYMYXIN B SULFATE 500; 1000 [USP'U]/G; [USP'U]/G
OINTMENT TOPICAL ONCE
OUTPATIENT
Start: 2024-04-18 | End: 2024-04-18

## 2024-04-18 RX ORDER — GENTAMICIN SULFATE 1 MG/G
OINTMENT TOPICAL ONCE
OUTPATIENT
Start: 2024-04-18 | End: 2024-04-18

## 2024-04-18 RX ORDER — CLOBETASOL PROPIONATE 0.5 MG/G
OINTMENT TOPICAL ONCE
OUTPATIENT
Start: 2024-04-18 | End: 2024-04-18

## 2024-04-18 RX ORDER — TRIAMCINOLONE ACETONIDE 1 MG/G
OINTMENT TOPICAL ONCE
OUTPATIENT
Start: 2024-04-18 | End: 2024-04-18

## 2024-04-18 RX ORDER — GINSENG 100 MG
CAPSULE ORAL ONCE
OUTPATIENT
Start: 2024-04-18 | End: 2024-04-18

## 2024-04-18 RX ORDER — SODIUM CHLOR/HYPOCHLOROUS ACID 0.033 %
SOLUTION, IRRIGATION IRRIGATION ONCE
OUTPATIENT
Start: 2024-04-18 | End: 2024-04-18

## 2024-04-18 RX ORDER — IBUPROFEN 200 MG
TABLET ORAL ONCE
OUTPATIENT
Start: 2024-04-18 | End: 2024-04-18

## 2024-04-18 RX ORDER — LIDOCAINE 50 MG/G
OINTMENT TOPICAL ONCE
OUTPATIENT
Start: 2024-04-18 | End: 2024-04-18

## 2024-04-18 RX ORDER — SODIUM CHLOR/HYPOCHLOROUS ACID 0.033 %
SOLUTION, IRRIGATION IRRIGATION ONCE
Status: COMPLETED | OUTPATIENT
Start: 2024-04-18 | End: 2024-04-18

## 2024-04-18 RX ADMIN — Medication: at 08:57

## 2024-04-18 RX ADMIN — LIDOCAINE HYDROCHLORIDE: 40 SOLUTION TOPICAL at 08:44

## 2024-04-18 ASSESSMENT — PAIN DESCRIPTION - LOCATION: LOCATION: LEG

## 2024-04-18 ASSESSMENT — PAIN DESCRIPTION - DESCRIPTORS: DESCRIPTORS: BURNING

## 2024-04-18 ASSESSMENT — PAIN DESCRIPTION - ORIENTATION: ORIENTATION: RIGHT

## 2024-04-18 ASSESSMENT — PAIN SCALES - GENERAL: PAINLEVEL_OUTOF10: 5

## 2024-04-18 NOTE — PROGRESS NOTES
Multilayer Compression Wrap   (Not Unna) Below the Knee    NAME:  Justin Baeza  YOB: 1965  MEDICAL RECORD NUMBER:  [id number]  DATE:  4/18/2024       Removed old Multilayer wrap if present and washed leg with mild soap/water.   Applied moisturizing agent to dry skin as needed.    Applied primary and secondary dressing as ordered    Applied multilayered dressing below the knee to Right lower leg(s)  (4 Layer Compression Wrap ) .    Instructed patient/caregiver not to remove dressing and to keep it clean and dry.    Instructed patient/caregiver on complications to report to provider, such as pain, numbness in toes, heavy drainage, and slippage of dressing.    Instructed patient on purpose of compression dressing and on activity and exercise recommendations.   Applied per   Guidelines    Electronically signed by Edgar Nunez RN on 4/18/2024 at 9:19 AM    
0828   Undermining Starts ___ O'Clock 0 04/18/24 0828   Undermining Ends___ O'Clock 0 04/18/24 0828   Undermining Maxium Distance (cm) 0 04/18/24 0828   Wound Assessment Fibrin;Pink/red 04/18/24 0828   Drainage Amount Moderate (25-50%) 04/18/24 0828   Drainage Description Serosanguinous;Green 04/18/24 0828   Odor Mild 04/18/24 0828   Kiah-wound Assessment Hemosiderin staining (brown yellow);Intact;Maceration 04/18/24 0828   Margins Defined edges 04/18/24 0828   Wound Thickness Description not for Pressure Injury Full thickness 04/18/24 0828   Number of days: 632       Wound 07/25/22 Leg Right;Anterior;Lower #2 (Active)   Wound Image   04/04/24 0813   Wound Etiology Venous 07/25/22 1040   Dressing Status New dressing applied 02/08/24 0843   Wound Cleansed Soap and water 04/18/24 0828   Dressing/Treatment Foam impregnated with Ag 04/15/24 0807   Wound Length (cm) 1.5 cm 04/18/24 0828   Wound Width (cm) 3 cm 04/18/24 0828   Wound Depth (cm) 0.1 cm 04/18/24 0828   Wound Surface Area (cm^2) 4.5 cm^2 04/18/24 0828   Change in Wound Size % (l*w) 90.13 04/18/24 0828   Wound Volume (cm^3) 0.45 cm^3 04/18/24 0828   Wound Healing % 90 04/18/24 0828   Post-Procedure Length (cm) 1.6 cm 04/18/24 0855   Post-Procedure Width (cm) 3.1 cm 04/18/24 0855   Post-Procedure Depth (cm) 0.3 cm 04/18/24 0855   Post-Procedure Surface Area (cm^2) 4.96 cm^2 04/18/24 0855   Post-Procedure Volume (cm^3) 1.488 cm^3 04/18/24 0855   Distance Tunneling (cm) 0 cm 04/18/24 0828   Tunneling Position ___ O'Clock 0 04/18/24 0828   Undermining Starts ___ O'Clock 0 04/18/24 0828   Undermining Ends___ O'Clock 0 04/18/24 0828   Undermining Maxium Distance (cm) 0 04/18/24 0828   Wound Assessment Fibrin;Pink/red 04/18/24 0828   Drainage Amount Moderate (25-50%) 04/18/24 0828   Drainage Description Serosanguinous;Green 04/18/24 0828   Odor Mild 04/18/24 0828   Kiah-wound Assessment Intact 04/18/24 0828   Margins Defined edges 04/18/24 0828   Wound Thickness

## 2024-04-18 NOTE — PATIENT INSTRUCTIONS
Wound Care Center Physician Orders and Discharge Instructions  [facility]   [address]  Telephone: [phone] FAX [phone]  NAME:  Justin Baeza  YOB: 1965  MEDICAL RECORD NUMBER:  [id number]  DATE: 4/18/2024      Return Appointment:  Return Appointment: With Khang Giles MD  in  1 Week(s)  [x] Return Appointment for a Wound Assessment with the nurse on:      Future Appointments   Date Time Provider Department Center   4/24/2024  1:30 PM [facility] MEDICATION MANAGEMENT [facility]         Orders Placed This Encounter   Procedures    Initiate Outpatient Wound Care Protocol       Home Care Company: none    Medically necessary services for evaluation and treatment: []Skilled Nursing (using clean technique) []PT (Eval & Treat) []OT (Eval & Treat) []Social Work []Dietician []Other:      Wound care instructions:  If you smoke we ask that you refrain from smoking. Smoking inhibits wounds from healing.  When taking antibiotics take the entire prescription as ordered. Do not stop taking until medication is all gone unless otherwise instructed.   Exercise as tolerated.   Keep weight off wounds and reposition every 2 hours if applicable.  Do not get wounds wet in the bath or shower unless otherwise instructed by your physician. If your wound is on your foot or leg, you may purchase a cast bag. Please ask at the pharmacy.  Wash hands with soap and water prior to and after every dressing change.    [x]Wash wounds with: No need to wash. Leave dressing in place.    [x]Kiah wound Topical Treatments: Do not apply lotions, creams, or ointments to the skin around the wound bed unless directed as followed:   Apply around the wound:       [x]Wound Location: right lower posterior leg wounds  Apply Primary Dressing to wound: Foam with silver (I.e. Polymem Ag)  Secondary Dressing: Extra absorbant pad   Avoid contact of tape with skin if possible.  When to

## 2024-04-25 ENCOUNTER — TELEPHONE (OUTPATIENT)
Dept: PHARMACY | Age: 59
End: 2024-04-25

## 2024-04-25 ENCOUNTER — HOSPITAL ENCOUNTER (OUTPATIENT)
Dept: WOUND CARE | Age: 59
Discharge: HOME | End: 2024-04-25
Attending: SPECIALIST
Payer: COMMERCIAL

## 2024-04-25 VITALS
SYSTOLIC BLOOD PRESSURE: 140 MMHG | TEMPERATURE: 97.5 F | DIASTOLIC BLOOD PRESSURE: 88 MMHG | HEART RATE: 75 BPM | RESPIRATION RATE: 16 BRPM

## 2024-04-25 DIAGNOSIS — I87.331 IDIOPATHIC CHRONIC VENOUS HYPERTENSION OF RIGHT LOWER EXTREMITY WITH ULCER AND INFLAMMATION (HCC): ICD-10-CM

## 2024-04-25 DIAGNOSIS — Z91.199 NONCOMPLIANCE: ICD-10-CM

## 2024-04-25 DIAGNOSIS — I89.0 LYMPHEDEMA: ICD-10-CM

## 2024-04-25 DIAGNOSIS — I83.009 VENOUS ULCER (HCC): ICD-10-CM

## 2024-04-25 DIAGNOSIS — L97.909 VENOUS ULCER (HCC): ICD-10-CM

## 2024-04-25 DIAGNOSIS — I89.0 CHRONIC ACQUIRED LYMPHEDEMA: Primary | ICD-10-CM

## 2024-04-25 PROCEDURE — 29581 APPL MULTLAYER CMPRN SYS LEG: CPT

## 2024-04-25 PROCEDURE — 6370000000 HC RX 637 (ALT 250 FOR IP): Performed by: SPECIALIST

## 2024-04-25 PROCEDURE — 11045 DBRDMT SUBQ TISS EACH ADDL: CPT

## 2024-04-25 PROCEDURE — 11042 DBRDMT SUBQ TIS 1ST 20SQCM/<: CPT

## 2024-04-25 RX ORDER — LIDOCAINE HYDROCHLORIDE 40 MG/ML
SOLUTION TOPICAL ONCE
OUTPATIENT
Start: 2024-04-25 | End: 2024-04-25

## 2024-04-25 RX ORDER — LIDOCAINE HYDROCHLORIDE 20 MG/ML
JELLY TOPICAL ONCE
OUTPATIENT
Start: 2024-04-25 | End: 2024-04-25

## 2024-04-25 RX ORDER — SODIUM CHLOR/HYPOCHLOROUS ACID 0.033 %
SOLUTION, IRRIGATION IRRIGATION ONCE
Status: COMPLETED | OUTPATIENT
Start: 2024-04-25 | End: 2024-04-25

## 2024-04-25 RX ORDER — LIDOCAINE 50 MG/G
OINTMENT TOPICAL ONCE
OUTPATIENT
Start: 2024-04-25 | End: 2024-04-25

## 2024-04-25 RX ORDER — BETAMETHASONE DIPROPIONATE 0.05 %
OINTMENT (GRAM) TOPICAL ONCE
OUTPATIENT
Start: 2024-04-25 | End: 2024-04-25

## 2024-04-25 RX ORDER — SODIUM CHLOR/HYPOCHLOROUS ACID 0.033 %
SOLUTION, IRRIGATION IRRIGATION ONCE
OUTPATIENT
Start: 2024-04-25 | End: 2024-04-25

## 2024-04-25 RX ORDER — IBUPROFEN 200 MG
TABLET ORAL ONCE
OUTPATIENT
Start: 2024-04-25 | End: 2024-04-25

## 2024-04-25 RX ORDER — GINSENG 100 MG
CAPSULE ORAL ONCE
OUTPATIENT
Start: 2024-04-25 | End: 2024-04-25

## 2024-04-25 RX ORDER — LIDOCAINE 40 MG/G
CREAM TOPICAL ONCE
OUTPATIENT
Start: 2024-04-25 | End: 2024-04-25

## 2024-04-25 RX ORDER — LIDOCAINE HYDROCHLORIDE 40 MG/ML
SOLUTION TOPICAL ONCE
Status: COMPLETED | OUTPATIENT
Start: 2024-04-25 | End: 2024-04-25

## 2024-04-25 RX ORDER — GENTAMICIN SULFATE 1 MG/G
OINTMENT TOPICAL ONCE
OUTPATIENT
Start: 2024-04-25 | End: 2024-04-25

## 2024-04-25 RX ORDER — BACITRACIN ZINC AND POLYMYXIN B SULFATE 500; 1000 [USP'U]/G; [USP'U]/G
OINTMENT TOPICAL ONCE
OUTPATIENT
Start: 2024-04-25 | End: 2024-04-25

## 2024-04-25 RX ORDER — TRIAMCINOLONE ACETONIDE 1 MG/G
OINTMENT TOPICAL ONCE
OUTPATIENT
Start: 2024-04-25 | End: 2024-04-25

## 2024-04-25 RX ORDER — AMOXICILLIN AND CLAVULANATE POTASSIUM 875; 125 MG/1; MG/1
1 TABLET, FILM COATED ORAL 2 TIMES DAILY
COMMUNITY
Start: 2024-04-24

## 2024-04-25 RX ORDER — CLOBETASOL PROPIONATE 0.5 MG/G
OINTMENT TOPICAL ONCE
OUTPATIENT
Start: 2024-04-25 | End: 2024-04-25

## 2024-04-25 RX ADMIN — LIDOCAINE HYDROCHLORIDE: 40 SOLUTION TOPICAL at 08:15

## 2024-04-25 RX ADMIN — Medication: at 08:39

## 2024-04-25 ASSESSMENT — PAIN SCALES - GENERAL: PAINLEVEL_OUTOF10: 6

## 2024-04-25 ASSESSMENT — PAIN DESCRIPTION - ONSET: ONSET: ON-GOING

## 2024-04-25 ASSESSMENT — PAIN DESCRIPTION - LOCATION: LOCATION: LEG

## 2024-04-25 ASSESSMENT — PAIN DESCRIPTION - FREQUENCY: FREQUENCY: CONTINUOUS

## 2024-04-25 ASSESSMENT — PAIN DESCRIPTION - ORIENTATION: ORIENTATION: RIGHT

## 2024-04-25 ASSESSMENT — PAIN DESCRIPTION - DESCRIPTORS: DESCRIPTORS: BURNING

## 2024-04-25 ASSESSMENT — PAIN DESCRIPTION - PAIN TYPE: TYPE: CHRONIC PAIN

## 2024-04-25 NOTE — PROGRESS NOTES
[facility] Wound Care Center  Progress Note and Procedure Note      Justin Baeza  MEDICAL RECORD NUMBER:  [id number]  AGE: 58 y.o.   GENDER: male  : 1965  EPISODE DATE:  2024    Subjective:     Chief Complaint   Patient presents with    Wound Check     right lower leg         HISTORY of PRESENT ILLNESS HPI     Justin Baeza is a 58 y.o. male who presents today for wound/ulcer evaluation.   History of Wound Context:  Patient continues follow-up for chronic venous insufficiency with lymphedema and ulceration. There continues to be drainage from his 4-layer compression wrap he has seen Dr. Agustin from infectious disease who agrees to place patient back on a course of IV Zosyn based on recent tissue culture reports done here in wound care. Patient once again admits to very little utilization of his lymphedema pumps. He is on his feet for protracted periods of time due to his multiple jobs.  He is now off antibiotics.  Patient complains of decreasing drainage this past week.    Recent tissue culture once again contains polymicrobial organism.  Once again Dr. Agustin has seen most recent tissue culture results and has placed patient on Augmentin 875 mg twice daily.  Compression wrap was not changed during the week  Wound/Ulcer Pain Timing/Severity: none  Quality of pain: N/A  Severity:  0 / 10   Modifying Factors: None  Associated Signs/Symptoms: edema and drainage    Ulcer Identification:  Ulcer Type: venous    Contributing Factors: edema, venous stasis, lymphedema, obesity, and anticoagulation therapy    Acute Wound: N/A not an acute wound    PAST MEDICAL HISTORY        Diagnosis Date    DVT (deep venous thrombosis) (HCC)     Hx of blood clots     Pain and swelling of right lower leg        PAST SURGICAL HISTORY    Past Surgical History:   Procedure Laterality Date    BALLOON ANGIOPLASTY, ARTERY Right 2017    at [facility]    DILATATION, ESOPHAGUS      SKIN SPLIT GRAFT Right        FAMILY

## 2024-04-25 NOTE — PROGRESS NOTES
Multilayer Compression Wrap   (Not Unna) Below the Knee    NAME:  Justin Baeza  YOB: 1965  MEDICAL RECORD NUMBER:  [id number]  DATE:  4/25/2024       Removed old Multilayer wrap if present and washed leg with mild soap/water.   Applied moisturizing agent to dry skin as needed.    Applied primary and secondary dressing as ordered    Applied multilayered dressing below the knee to Right lower leg(s)  (4 Layer Compression Wrap ) .    Instructed patient/caregiver not to remove dressing and to keep it clean and dry.    Instructed patient/caregiver on complications to report to provider, such as pain, numbness in toes, heavy drainage, and slippage of dressing.    Instructed patient on purpose of compression dressing and on activity and exercise recommendations.   Applied per   Guidelines    Electronically signed by Kathy Gomez RN on 4/25/2024 at 8:47 AM

## 2024-04-25 NOTE — PATIENT INSTRUCTIONS
Wound Care Center Physician Orders and Discharge Instructions  [facility]   [address]  Telephone: [phone] FAX [phone]  NAME:  Justin Baeza  YOB: 1965  MEDICAL RECORD NUMBER:  [id number]  DATE: 4/25/2024      Return Appointment:  Return Appointment: With Khang Giles MD  in  1 Week(s)  [x] Return Appointment for a Wound Assessment with the nurse on:  04/29/24    No future appointments.        Orders Placed This Encounter   Procedures    Initiate Outpatient Wound Care Protocol       Home Care Company: none    Medically necessary services for evaluation and treatment: []Skilled Nursing (using clean technique) []PT (Eval & Treat) []OT (Eval & Treat) []Social Work []Dietician []Other:      Wound care instructions:  If you smoke we ask that you refrain from smoking. Smoking inhibits wounds from healing.  When taking antibiotics take the entire prescription as ordered. Do not stop taking until medication is all gone unless otherwise instructed.   Exercise as tolerated.   Keep weight off wounds and reposition every 2 hours if applicable.  Do not get wounds wet in the bath or shower unless otherwise instructed by your physician. If your wound is on your foot or leg, you may purchase a cast bag. Please ask at the pharmacy.  Wash hands with soap and water prior to and after every dressing change.    [x]Wash wounds with: No need to wash. Leave dressing in place.    [x]Kiah wound Topical Treatments: Do not apply lotions, creams, or ointments to the skin around the wound bed unless directed as followed:   Apply around the wound: zinc paste      [x]Wound Location: right lower posterior leg wounds  Apply Primary Dressing to wound: Foam with silver (I.e. Polymem Ag)  Secondary Dressing: Extra absorbant pad   Avoid contact of tape with skin if possible.  When to change Dressing: DO NOT CHANGE      [x]Wound Location: right lower anterior, medial,

## 2024-04-29 ENCOUNTER — HOSPITAL ENCOUNTER (OUTPATIENT)
Dept: WOUND CARE | Age: 59
Discharge: HOME | End: 2024-04-29
Attending: SPECIALIST
Payer: COMMERCIAL

## 2024-04-29 VITALS — DIASTOLIC BLOOD PRESSURE: 83 MMHG | TEMPERATURE: 97.3 F | SYSTOLIC BLOOD PRESSURE: 142 MMHG | RESPIRATION RATE: 16 BRPM

## 2024-04-29 PROCEDURE — 29581 APPL MULTLAYER CMPRN SYS LEG: CPT

## 2024-04-29 ASSESSMENT — PAIN SCALES - GENERAL: PAINLEVEL_OUTOF10: 9

## 2024-04-29 ASSESSMENT — PAIN DESCRIPTION - ORIENTATION: ORIENTATION: RIGHT

## 2024-04-29 ASSESSMENT — PAIN DESCRIPTION - LOCATION: LOCATION: LEG

## 2024-04-29 NOTE — PROGRESS NOTES
Multilayer Compression Wrap   (Not Unna) Below the Knee    NAME:  Justin Baeza  YOB: 1965  MEDICAL RECORD NUMBER:  [id number]  DATE:  4/29/2024       Removed old Multilayer wrap if present and washed leg with mild soap/water.   Applied moisturizing agent to dry skin as needed.    Applied primary and secondary dressing as ordered    Applied multilayered dressing below the knee to Right lower leg(s)  (4 Layer Compression Wrap ) .    Instructed patient/caregiver not to remove dressing and to keep it clean and dry.    Instructed patient/caregiver on complications to report to provider, such as pain, numbness in toes, heavy drainage, and slippage of dressing.    Instructed patient on purpose of compression dressing and on activity and exercise recommendations.   Applied per   Guidelines    Electronically signed by Edgar Nunez RN on 4/29/2024 at 9:02 AM

## 2024-04-29 NOTE — PATIENT INSTRUCTIONS
Wound Care Center Physician Orders and Discharge Instructions  [facility]  [address] [facility]. [address] [place] [id number]  Telephone: [phone]      FAX [phone]    NAME:  Justin Baeza  YOB: 1965  MEDICAL RECORD NUMBER:  [id number]  DATE:  4/29/2024      Wound care:  Continue to follow the instructions and recommendations from your last doctor visit.  The dressing(s) applied is the same as your last visit. Please refer to your last discharge instruction for the information on your wound care.      If there were any changes made, please follow the instructions as written here: same    Future Appointments     Future Appointments   Date Time Provider Department Center   5/2/2024  8:00 AM Khang Giles MD [facility]           Your nurse  is:  Yesenia     Electronically signed by Edgar Nunez RN on 4/29/2024 at 8:58 AM     Wound Care Center Information: Should you experience any significant changes in your wound(s) or have questions about your wound care, please contact the [facility] Wound Care Center at [phone]. Our hours vary so please leave a message. Please give us 24-48 hours to return your call.   If you need help with your wounds and cannot wait until we are available, contact your PCP or go to the hospital emergency room.       Physician orders by:  Dr. Giles        The information contained in the After Visit Summary has been reviewed with me, the patient and/or responsible adult, by my health care provider(s).  I had the opportunity to ask questions regarding this information.  I have elected to receive;      [] Patient unable to sign Discharge Instructions. Given to ECF/Transportation/POA

## 2024-05-02 ENCOUNTER — HOSPITAL ENCOUNTER (OUTPATIENT)
Dept: WOUND CARE | Age: 59
Discharge: HOME | End: 2024-05-02
Attending: SPECIALIST
Payer: COMMERCIAL

## 2024-05-02 VITALS
RESPIRATION RATE: 16 BRPM | TEMPERATURE: 97 F | HEART RATE: 64 BPM | DIASTOLIC BLOOD PRESSURE: 80 MMHG | SYSTOLIC BLOOD PRESSURE: 129 MMHG

## 2024-05-02 DIAGNOSIS — Z91.199 NONCOMPLIANCE: ICD-10-CM

## 2024-05-02 DIAGNOSIS — I83.009 VENOUS ULCER (HCC): ICD-10-CM

## 2024-05-02 DIAGNOSIS — I89.0 LYMPHEDEMA: ICD-10-CM

## 2024-05-02 DIAGNOSIS — L97.909 VENOUS ULCER (HCC): ICD-10-CM

## 2024-05-02 DIAGNOSIS — I87.331 IDIOPATHIC CHRONIC VENOUS HYPERTENSION OF RIGHT LOWER EXTREMITY WITH ULCER AND INFLAMMATION (HCC): ICD-10-CM

## 2024-05-02 DIAGNOSIS — I89.0 CHRONIC ACQUIRED LYMPHEDEMA: Primary | ICD-10-CM

## 2024-05-02 PROCEDURE — 11045 DBRDMT SUBQ TISS EACH ADDL: CPT | Performed by: SPECIALIST

## 2024-05-02 PROCEDURE — 11042 DBRDMT SUBQ TIS 1ST 20SQCM/<: CPT

## 2024-05-02 PROCEDURE — 11045 DBRDMT SUBQ TISS EACH ADDL: CPT

## 2024-05-02 PROCEDURE — 6370000000 HC RX 637 (ALT 250 FOR IP): Performed by: SPECIALIST

## 2024-05-02 PROCEDURE — 29581 APPL MULTLAYER CMPRN SYS LEG: CPT

## 2024-05-02 PROCEDURE — 11042 DBRDMT SUBQ TIS 1ST 20SQCM/<: CPT | Performed by: SPECIALIST

## 2024-05-02 RX ORDER — IBUPROFEN 200 MG
TABLET ORAL ONCE
OUTPATIENT
Start: 2024-05-02 | End: 2024-05-02

## 2024-05-02 RX ORDER — BACITRACIN ZINC AND POLYMYXIN B SULFATE 500; 1000 [USP'U]/G; [USP'U]/G
OINTMENT TOPICAL ONCE
OUTPATIENT
Start: 2024-05-02 | End: 2024-05-02

## 2024-05-02 RX ORDER — LIDOCAINE HYDROCHLORIDE 40 MG/ML
SOLUTION TOPICAL ONCE
OUTPATIENT
Start: 2024-05-02 | End: 2024-05-02

## 2024-05-02 RX ORDER — BETAMETHASONE DIPROPIONATE 0.05 %
OINTMENT (GRAM) TOPICAL ONCE
OUTPATIENT
Start: 2024-05-02 | End: 2024-05-02

## 2024-05-02 RX ORDER — LIDOCAINE 40 MG/G
CREAM TOPICAL ONCE
OUTPATIENT
Start: 2024-05-02 | End: 2024-05-02

## 2024-05-02 RX ORDER — CLOBETASOL PROPIONATE 0.5 MG/G
OINTMENT TOPICAL ONCE
OUTPATIENT
Start: 2024-05-02 | End: 2024-05-02

## 2024-05-02 RX ORDER — GENTAMICIN SULFATE 1 MG/G
OINTMENT TOPICAL ONCE
OUTPATIENT
Start: 2024-05-02 | End: 2024-05-02

## 2024-05-02 RX ORDER — LIDOCAINE HYDROCHLORIDE 40 MG/ML
SOLUTION TOPICAL ONCE
Status: COMPLETED | OUTPATIENT
Start: 2024-05-02 | End: 2024-05-02

## 2024-05-02 RX ORDER — LIDOCAINE HYDROCHLORIDE 20 MG/ML
JELLY TOPICAL ONCE
OUTPATIENT
Start: 2024-05-02 | End: 2024-05-02

## 2024-05-02 RX ORDER — TRIAMCINOLONE ACETONIDE 1 MG/G
OINTMENT TOPICAL ONCE
OUTPATIENT
Start: 2024-05-02 | End: 2024-05-02

## 2024-05-02 RX ORDER — GINSENG 100 MG
CAPSULE ORAL ONCE
OUTPATIENT
Start: 2024-05-02 | End: 2024-05-02

## 2024-05-02 RX ORDER — LIDOCAINE 50 MG/G
OINTMENT TOPICAL ONCE
OUTPATIENT
Start: 2024-05-02 | End: 2024-05-02

## 2024-05-02 RX ORDER — SODIUM CHLOR/HYPOCHLOROUS ACID 0.033 %
SOLUTION, IRRIGATION IRRIGATION ONCE
Status: COMPLETED | OUTPATIENT
Start: 2024-05-02 | End: 2024-05-02

## 2024-05-02 RX ORDER — SODIUM CHLOR/HYPOCHLOROUS ACID 0.033 %
SOLUTION, IRRIGATION IRRIGATION ONCE
OUTPATIENT
Start: 2024-05-02 | End: 2024-05-02

## 2024-05-02 RX ADMIN — LIDOCAINE HYDROCHLORIDE: 40 SOLUTION TOPICAL at 08:26

## 2024-05-02 RX ADMIN — Medication: at 09:00

## 2024-05-02 ASSESSMENT — PAIN SCALES - GENERAL: PAINLEVEL_OUTOF10: 8

## 2024-05-02 ASSESSMENT — PAIN DESCRIPTION - ORIENTATION: ORIENTATION: RIGHT

## 2024-05-02 ASSESSMENT — PAIN DESCRIPTION - PAIN TYPE: TYPE: CHRONIC PAIN

## 2024-05-02 ASSESSMENT — PAIN DESCRIPTION - DESCRIPTORS: DESCRIPTORS: BURNING

## 2024-05-02 ASSESSMENT — PAIN DESCRIPTION - FREQUENCY: FREQUENCY: CONTINUOUS

## 2024-05-02 ASSESSMENT — PAIN DESCRIPTION - LOCATION: LOCATION: LEG

## 2024-05-02 NOTE — PATIENT INSTRUCTIONS
Compression Wrap    Do not get leg(s) with wrap wet.  If wraps become too tight call the center or completely remove the wrap.   Elevate leg(s) above the level of the heart when sitting.  Avoid prolonged standing in one place.   Applied in Clinic on 5/2/2024  The Goal of this therapy is to reduce edema and get into long term compression garments to control venous insufficiency, lymphedema and reduce occurrence of venous ulcers    [x] Edema Control: 30-40mmHG  Apply: Compression Stocking on the Left leg  Apply every morning immediately when getting up. Remove every night before going to bed unless instructed otherwise     Elevate leg(s) above the level of the heart for 30 minutes 4-5 times a day and/or when sitting.  Avoid prolonged standing in one place.    [x] Lymphedema Therapy:  Wear Lymphedema pumps twice a day at settings prescribed by your physician.   Avoid prolonged standing in one place.      Dietary:  Important dietary reminders:  1. Increase Protein intake (i.e. Lean meats, fish, eggs, legumes, and yogurt)  2. No added salt  3. If diabetic, follow a diabetic diet and check glucose prior to meals or as instructed by your physician.    Dietary Supplements(Take twice a day unless instructed otherwise):  [] Papito  [] 30ml ProStat [] Ensure Complete [] Ensure Max/Premier [] Expedite [] Other:    Your nurse  is:  talisha     Electronically signed by Talisha Hawkins RN on 5/2/2024 at 8:58 AM     Wound Care Center Information: Should you experience any significant changes in your wound(s) or have questions about your wound care, please contact the [facility] Wound Care Center at [phone].   Hours of operation:  Mon:  8AM - 2PM  Tue: 11AM - 5PM  Wed: CLOSED  Thur: 8AM - 4:30PM  Fri:  8AM - 4:30PM  The office is closed on all major holidays.    Please give us 24-48 business hours to return your call.  These hours of operation are subject to change. If you need help with your wounds and cannot wait

## 2024-05-02 NOTE — PROGRESS NOTES
Multilayer Compression Wrap   (Not Unna) Below the Knee    NAME:  Justin Baeza  YOB: 1965  MEDICAL RECORD NUMBER:  [id number]  DATE:  5/2/2024       Removed old Multilayer wrap if present and washed leg with mild soap/water.   Applied moisturizing agent to dry skin as needed.    Applied primary and secondary dressing as ordered    Applied multilayered dressing below the knee to Right lower leg(s)  (4 Layer Compression Wrap ) .    Instructed patient/caregiver not to remove dressing and to keep it clean and dry.    Instructed patient/caregiver on complications to report to provider, such as pain, numbness in toes, heavy drainage, and slippage of dressing.    Instructed patient on purpose of compression dressing and on activity and exercise recommendations.   Applied per   Guidelines    Electronically signed by Mera Coelho RN on 5/2/2024 at 9:32 AM    
0815   Number of days: 646       Wound 07/25/22 Leg Right;Anterior;Lower #2 (Active)   Wound Image   05/02/24 0815   Wound Etiology Venous 07/25/22 1040   Dressing Status New dressing applied 02/08/24 0843   Wound Cleansed Vashe 05/02/24 0854   Dressing/Treatment Collagen 05/02/24 0930   Wound Length (cm) 1.2 cm 05/02/24 0815   Wound Width (cm) 3.6 cm 05/02/24 0815   Wound Depth (cm) 0.1 cm 05/02/24 0815   Wound Surface Area (cm^2) 4.32 cm^2 05/02/24 0815   Change in Wound Size % (l*w) 90.53 05/02/24 0815   Wound Volume (cm^3) 0.432 cm^3 05/02/24 0815   Wound Healing % 91 05/02/24 0815   Post-Procedure Length (cm) 1.3 cm 05/02/24 0854   Post-Procedure Width (cm) 3.7 cm 05/02/24 0854   Post-Procedure Depth (cm) 0.3 cm 05/02/24 0854   Post-Procedure Surface Area (cm^2) 4.81 cm^2 05/02/24 0854   Post-Procedure Volume (cm^3) 1.443 cm^3 05/02/24 0854   Distance Tunneling (cm) 0 cm 05/02/24 0815   Tunneling Position ___ O'Clock 0 04/18/24 0828   Undermining Starts ___ O'Clock 0 04/18/24 0828   Undermining Ends___ O'Clock 0 04/18/24 0828   Undermining Maxium Distance (cm) 0 05/02/24 0815   Wound Assessment Fibrin;Pink/red 05/02/24 0815   Drainage Amount Small (< 25%) 05/02/24 0815   Drainage Description Brown;Serosanguinous 05/02/24 0815   Odor None 05/02/24 0815   Kiah-wound Assessment Intact 05/02/24 0815   Margins Defined edges 05/02/24 0815   Wound Thickness Description not for Pressure Injury Full thickness 05/02/24 0815   Number of days: 646       Wound 07/25/22 Leg Right;Posterior;Lower #3 (Active)   Wound Image   05/02/24 0815   Wound Etiology Venous 07/25/22 1040   Dressing Status New dressing applied 02/08/24 0843   Wound Cleansed Vashe 05/02/24 0854   Dressing/Treatment Collagen 05/02/24 0930   Wound Length (cm) 2.5 cm 05/02/24 0815   Wound Width (cm) 8 cm 05/02/24 0815   Wound Depth (cm) 0.1 cm 05/02/24 0815   Wound Surface Area (cm^2) 20 cm^2 05/02/24 0815   Change in Wound Size % (l*w) 54.55 05/02/24 0815

## 2024-05-03 NOTE — TELEPHONE ENCOUNTER
LVm to r/s    Mia Keita, PharmD, BCACP  [facility]   [facility] [place] Ph: [phone]  [facility] [place] Ph: [phone]  5/3/2024 3:17 PM

## 2024-05-06 ENCOUNTER — HOSPITAL ENCOUNTER (OUTPATIENT)
Dept: WOUND CARE | Age: 59
Discharge: HOME | End: 2024-05-06
Attending: SPECIALIST

## 2024-05-06 VITALS
DIASTOLIC BLOOD PRESSURE: 79 MMHG | HEART RATE: 68 BPM | RESPIRATION RATE: 16 BRPM | SYSTOLIC BLOOD PRESSURE: 125 MMHG | TEMPERATURE: 97.3 F

## 2024-05-06 DIAGNOSIS — I87.331 IDIOPATHIC CHRONIC VENOUS HYPERTENSION OF RIGHT LOWER EXTREMITY WITH ULCER AND INFLAMMATION (HCC): ICD-10-CM

## 2024-05-06 DIAGNOSIS — L97.909 VENOUS ULCER (HCC): ICD-10-CM

## 2024-05-06 DIAGNOSIS — I83.009 VENOUS ULCER (HCC): ICD-10-CM

## 2024-05-06 DIAGNOSIS — Z91.199 NONCOMPLIANCE: ICD-10-CM

## 2024-05-06 DIAGNOSIS — I89.0 LYMPHEDEMA: ICD-10-CM

## 2024-05-06 DIAGNOSIS — I89.0 CHRONIC ACQUIRED LYMPHEDEMA: Primary | ICD-10-CM

## 2024-05-06 ASSESSMENT — PAIN DESCRIPTION - DESCRIPTORS: DESCRIPTORS: BURNING

## 2024-05-06 ASSESSMENT — PAIN DESCRIPTION - FREQUENCY: FREQUENCY: CONTINUOUS

## 2024-05-06 ASSESSMENT — PAIN DESCRIPTION - LOCATION: LOCATION: LEG

## 2024-05-06 ASSESSMENT — PAIN DESCRIPTION - ORIENTATION: ORIENTATION: RIGHT

## 2024-05-06 ASSESSMENT — PAIN SCALES - GENERAL: PAINLEVEL_OUTOF10: 7

## 2024-05-06 ASSESSMENT — PAIN DESCRIPTION - PAIN TYPE: TYPE: CHRONIC PAIN

## 2024-05-06 NOTE — PROGRESS NOTES
Multilayer Compression Wrap   (Not Unna) Below the Knee    NAME:  Justin Baeza  YOB: 1965  MEDICAL RECORD NUMBER:  [id number]  DATE:  5/6/2024       Removed old Multilayer wrap if present and washed leg with mild soap/water.   Applied moisturizing agent to dry skin as needed.    Applied primary and secondary dressing as ordered    Applied multilayered dressing below the knee to Right lower leg(s)  (4 layer compression wrap ) .    Instructed patient/caregiver not to remove dressing and to keep it clean and dry.    Instructed patient/caregiver on complications to report to provider, such as pain, numbness in toes, heavy drainage, and slippage of dressing.    Instructed patient on purpose of compression dressing and on activity and exercise recommendations.   Applied per   Guidelines    Electronically signed by Mera Coelho RN on 5/6/2024 at 8:38 AM

## 2024-05-09 ENCOUNTER — HOSPITAL ENCOUNTER (OUTPATIENT)
Dept: WOUND CARE | Age: 59
Discharge: HOME | End: 2024-05-09
Attending: SPECIALIST
Payer: COMMERCIAL

## 2024-05-09 VITALS
RESPIRATION RATE: 16 BRPM | TEMPERATURE: 97 F | DIASTOLIC BLOOD PRESSURE: 82 MMHG | SYSTOLIC BLOOD PRESSURE: 153 MMHG | HEART RATE: 68 BPM

## 2024-05-09 DIAGNOSIS — Z91.199 NONCOMPLIANCE: ICD-10-CM

## 2024-05-09 DIAGNOSIS — I89.0 CHRONIC ACQUIRED LYMPHEDEMA: Primary | ICD-10-CM

## 2024-05-09 DIAGNOSIS — I83.009 VENOUS ULCER (HCC): ICD-10-CM

## 2024-05-09 DIAGNOSIS — I87.331 IDIOPATHIC CHRONIC VENOUS HYPERTENSION OF RIGHT LOWER EXTREMITY WITH ULCER AND INFLAMMATION (HCC): ICD-10-CM

## 2024-05-09 DIAGNOSIS — L97.909 VENOUS ULCER (HCC): ICD-10-CM

## 2024-05-09 DIAGNOSIS — I89.0 LYMPHEDEMA: ICD-10-CM

## 2024-05-09 PROCEDURE — 11042 DBRDMT SUBQ TIS 1ST 20SQCM/<: CPT | Performed by: SPECIALIST

## 2024-05-09 PROCEDURE — 11045 DBRDMT SUBQ TISS EACH ADDL: CPT

## 2024-05-09 PROCEDURE — 11042 DBRDMT SUBQ TIS 1ST 20SQCM/<: CPT

## 2024-05-09 PROCEDURE — 29581 APPL MULTLAYER CMPRN SYS LEG: CPT

## 2024-05-09 PROCEDURE — 11045 DBRDMT SUBQ TISS EACH ADDL: CPT | Performed by: SPECIALIST

## 2024-05-09 PROCEDURE — 6370000000 HC RX 637 (ALT 250 FOR IP): Performed by: SPECIALIST

## 2024-05-09 RX ORDER — LIDOCAINE HYDROCHLORIDE 20 MG/ML
JELLY TOPICAL ONCE
OUTPATIENT
Start: 2024-05-09 | End: 2024-05-09

## 2024-05-09 RX ORDER — BACITRACIN ZINC AND POLYMYXIN B SULFATE 500; 1000 [USP'U]/G; [USP'U]/G
OINTMENT TOPICAL ONCE
OUTPATIENT
Start: 2024-05-09 | End: 2024-05-09

## 2024-05-09 RX ORDER — SODIUM CHLOR/HYPOCHLOROUS ACID 0.033 %
SOLUTION, IRRIGATION IRRIGATION ONCE
OUTPATIENT
Start: 2024-05-09 | End: 2024-05-09

## 2024-05-09 RX ORDER — CLOBETASOL PROPIONATE 0.5 MG/G
OINTMENT TOPICAL ONCE
OUTPATIENT
Start: 2024-05-09 | End: 2024-05-09

## 2024-05-09 RX ORDER — TRIAMCINOLONE ACETONIDE 1 MG/G
OINTMENT TOPICAL ONCE
OUTPATIENT
Start: 2024-05-09 | End: 2024-05-09

## 2024-05-09 RX ORDER — BETAMETHASONE DIPROPIONATE 0.05 %
OINTMENT (GRAM) TOPICAL ONCE
OUTPATIENT
Start: 2024-05-09 | End: 2024-05-09

## 2024-05-09 RX ORDER — LIDOCAINE 50 MG/G
OINTMENT TOPICAL ONCE
OUTPATIENT
Start: 2024-05-09 | End: 2024-05-09

## 2024-05-09 RX ORDER — GENTAMICIN SULFATE 1 MG/G
OINTMENT TOPICAL ONCE
OUTPATIENT
Start: 2024-05-09 | End: 2024-05-09

## 2024-05-09 RX ORDER — LIDOCAINE HYDROCHLORIDE 40 MG/ML
SOLUTION TOPICAL ONCE
Status: COMPLETED | OUTPATIENT
Start: 2024-05-09 | End: 2024-05-09

## 2024-05-09 RX ORDER — LIDOCAINE 40 MG/G
CREAM TOPICAL ONCE
OUTPATIENT
Start: 2024-05-09 | End: 2024-05-09

## 2024-05-09 RX ORDER — LIDOCAINE HYDROCHLORIDE 40 MG/ML
SOLUTION TOPICAL ONCE
OUTPATIENT
Start: 2024-05-09 | End: 2024-05-09

## 2024-05-09 RX ORDER — IBUPROFEN 200 MG
TABLET ORAL ONCE
OUTPATIENT
Start: 2024-05-09 | End: 2024-05-09

## 2024-05-09 RX ORDER — SODIUM CHLOR/HYPOCHLOROUS ACID 0.033 %
SOLUTION, IRRIGATION IRRIGATION ONCE
Status: COMPLETED | OUTPATIENT
Start: 2024-05-09 | End: 2024-05-09

## 2024-05-09 RX ORDER — GINSENG 100 MG
CAPSULE ORAL ONCE
OUTPATIENT
Start: 2024-05-09 | End: 2024-05-09

## 2024-05-09 RX ADMIN — LIDOCAINE HYDROCHLORIDE: 40 SOLUTION TOPICAL at 08:47

## 2024-05-09 RX ADMIN — Medication: at 09:00

## 2024-05-09 ASSESSMENT — PAIN DESCRIPTION - LOCATION: LOCATION: LEG

## 2024-05-09 ASSESSMENT — PAIN DESCRIPTION - PAIN TYPE: TYPE: CHRONIC PAIN

## 2024-05-09 ASSESSMENT — PAIN DESCRIPTION - FREQUENCY: FREQUENCY: CONTINUOUS

## 2024-05-09 ASSESSMENT — PAIN DESCRIPTION - DESCRIPTORS: DESCRIPTORS: BURNING

## 2024-05-09 ASSESSMENT — PAIN DESCRIPTION - ORIENTATION: ORIENTATION: RIGHT

## 2024-05-09 ASSESSMENT — PAIN SCALES - GENERAL: PAINLEVEL_OUTOF10: 5

## 2024-05-09 NOTE — PROGRESS NOTES
Multilayer Compression Wrap   (Not Unna) Below the Knee    NAME:  Justin Baeza  YOB: 1965  MEDICAL RECORD NUMBER:  [id number]  DATE:  5/9/2024       Removed old Multilayer wrap if present and washed leg with mild soap/water.   Applied moisturizing agent to dry skin as needed.    Applied primary and secondary dressing as ordered    Applied multilayered dressing below the knee to Right lower leg(s)  (4 layer compression wrap ) .    Instructed patient/caregiver not to remove dressing and to keep it clean and dry.    Instructed patient/caregiver on complications to report to provider, such as pain, numbness in toes, heavy drainage, and slippage of dressing.    Instructed patient on purpose of compression dressing and on activity and exercise recommendations.   Applied per   Guidelines    Electronically signed by Edgar Nunez RN on 5/9/2024 at 9:22 AM    
prescribed by your physician.   Avoid prolonged standing in one place.      Dietary:  Important dietary reminders:  1. Increase Protein intake (i.e. Lean meats, fish, eggs, legumes, and yogurt)  2. No added salt  3. If diabetic, follow a diabetic diet and check glucose prior to meals or as instructed by your physician.    Dietary Supplements(Take twice a day unless instructed otherwise):  [] Papito  [] 30ml ProStat [] Ensure Complete [] Ensure Max/Premier [] Expedite [] Other:    Your nurse  is:  talisha     Electronically signed by Talisha Hawkins RN on 5/9/2024 at 9:05 AM     Wound Care Center Information: Should you experience any significant changes in your wound(s) or have questions about your wound care, please contact the [facility] at [phone].   Hours of operation:  Mon:  8AM - 2PM  Tue: 11AM - 5PM  Wed: CLOSED  Thur: 8AM - 4:30PM  Fri:  8AM - 4:30PM  The office is closed on all major holidays.    Please give us 24-48 business hours to return your call.  These hours of operation are subject to change. If you need help with your wounds and cannot wait until we are available, contact your PCP or go to your preferred emergency room.     Call your doctor now or seek immediate medical care if:    You have symptoms of infection, such as:  Increased pain, swelling, warmth, or redness.  Red streaks leading from the area.  Pus draining from the area.  A fever.       Electronically signed by CHICO VELIZ MD on 5/9/2024 at 9:57 AM

## 2024-05-09 NOTE — PATIENT INSTRUCTIONS
Wound Care Center Physician Orders and Discharge Instructions  [facility]   [address] [place]  Telephone: [phone] FAX [phone]  NAME:  Justin Baeza  YOB: 1965  MEDICAL RECORD NUMBER:  [id number]  DATE: 5/9/2024      Return Appointment:  Return Appointment: With Khang Giles MD  in  1 Week(s)  [x] Return Appointment for a Wound Assessment with the nurse on:  05/13/24    No future appointments.        Orders Placed This Encounter   Procedures    Initiate Outpatient Wound Care Protocol       Home Care Company: none    Medically necessary services for evaluation and treatment: []Skilled Nursing (using clean technique) []PT (Eval & Treat) []OT (Eval & Treat) []Social Work []Dietician []Other:      Wound care instructions:  If you smoke we ask that you refrain from smoking. Smoking inhibits wounds from healing.  When taking antibiotics take the entire prescription as ordered. Do not stop taking until medication is all gone unless otherwise instructed.   Exercise as tolerated.   Keep weight off wounds and reposition every 2 hours if applicable.  Do not get wounds wet in the bath or shower unless otherwise instructed by your physician. If your wound is on your foot or leg, you may purchase a cast bag. Please ask at the pharmacy.  Wash hands with soap and water prior to and after every dressing change.    [x]Wash wounds with: No need to wash. Leave dressing in place.    [x]Kiah wound Topical Treatments: Do not apply lotions, creams, or ointments to the skin around the wound bed unless directed as followed:   Apply around the wound: zinc paste          [x]Wound Location: right lower  leg wounds  Apply Primary Dressing to wound:  collagen  Secondary Dressing: Extra absorbant pad   Avoid contact of tape with skin if possible.  When to change Dressing: DO NOT CHANGE    [x] Multilayer Compression Wrap:  Type: Applied on Right lower leg(s)  4 Layer

## 2024-05-13 ENCOUNTER — HOSPITAL ENCOUNTER (OUTPATIENT)
Dept: WOUND CARE | Age: 59
Discharge: HOME | End: 2024-05-13
Attending: SPECIALIST
Payer: COMMERCIAL

## 2024-05-13 VITALS — TEMPERATURE: 97.5 F | HEART RATE: 71 BPM | SYSTOLIC BLOOD PRESSURE: 132 MMHG | DIASTOLIC BLOOD PRESSURE: 92 MMHG

## 2024-05-13 PROCEDURE — 29581 APPL MULTLAYER CMPRN SYS LEG: CPT

## 2024-05-13 ASSESSMENT — PAIN DESCRIPTION - LOCATION: LOCATION: LEG

## 2024-05-13 ASSESSMENT — PAIN DESCRIPTION - PAIN TYPE: TYPE: CHRONIC PAIN

## 2024-05-13 ASSESSMENT — PAIN DESCRIPTION - DESCRIPTORS: DESCRIPTORS: BURNING

## 2024-05-13 ASSESSMENT — PAIN DESCRIPTION - FREQUENCY: FREQUENCY: CONTINUOUS

## 2024-05-13 ASSESSMENT — PAIN SCALES - GENERAL: PAINLEVEL_OUTOF10: 8

## 2024-05-13 NOTE — TELEPHONE ENCOUNTER
LVM #3 to r/s    Amirah Soriano, PharmD  [facility]  [place]: [phone]  [place]: [phone]  5/13/2024 2:24 PM

## 2024-05-13 NOTE — PROGRESS NOTES
Multilayer Compression Wrap   (Not Unna) Below the Knee    NAME:  Justin Baeza  YOB: 1965  MEDICAL RECORD NUMBER:  [id number]  DATE:  5/13/2024       Removed old Multilayer wrap if present and washed leg with mild soap/water.   Applied moisturizing agent to dry skin as needed.    Applied primary and secondary dressing as ordered    Applied multilayered dressing below the knee to Right lower leg(s)  (4 Layer Compression Wrap ) .    Instructed patient/caregiver not to remove dressing and to keep it clean and dry.    Instructed patient/caregiver on complications to report to provider, such as pain, numbness in toes, heavy drainage, and slippage of dressing.    Instructed patient on purpose of compression dressing and on activity and exercise recommendations.   Applied per   Guidelines    Electronically signed by Mera Coelho RN on 5/13/2024 at 8:42 AM

## 2024-05-16 ENCOUNTER — HOSPITAL ENCOUNTER (OUTPATIENT)
Dept: WOUND CARE | Age: 59
Discharge: HOME | End: 2024-05-16
Attending: SPECIALIST
Payer: COMMERCIAL

## 2024-05-16 VITALS — DIASTOLIC BLOOD PRESSURE: 79 MMHG | HEART RATE: 66 BPM | TEMPERATURE: 97.4 F | SYSTOLIC BLOOD PRESSURE: 163 MMHG

## 2024-05-16 DIAGNOSIS — I89.0 CHRONIC ACQUIRED LYMPHEDEMA: Primary | ICD-10-CM

## 2024-05-16 DIAGNOSIS — L97.909 VENOUS ULCER (HCC): ICD-10-CM

## 2024-05-16 DIAGNOSIS — I87.331 IDIOPATHIC CHRONIC VENOUS HYPERTENSION OF RIGHT LOWER EXTREMITY WITH ULCER AND INFLAMMATION (HCC): ICD-10-CM

## 2024-05-16 DIAGNOSIS — I83.009 VENOUS ULCER (HCC): ICD-10-CM

## 2024-05-16 DIAGNOSIS — I89.0 LYMPHEDEMA: ICD-10-CM

## 2024-05-16 DIAGNOSIS — Z91.199 NONCOMPLIANCE: ICD-10-CM

## 2024-05-16 PROCEDURE — 11042 DBRDMT SUBQ TIS 1ST 20SQCM/<: CPT | Performed by: SPECIALIST

## 2024-05-16 PROCEDURE — 11045 DBRDMT SUBQ TISS EACH ADDL: CPT

## 2024-05-16 PROCEDURE — 87070 CULTURE OTHR SPECIMN AEROBIC: CPT

## 2024-05-16 PROCEDURE — 6370000000 HC RX 637 (ALT 250 FOR IP): Performed by: SPECIALIST

## 2024-05-16 PROCEDURE — 87205 SMEAR GRAM STAIN: CPT

## 2024-05-16 PROCEDURE — 11042 DBRDMT SUBQ TIS 1ST 20SQCM/<: CPT

## 2024-05-16 PROCEDURE — 11045 DBRDMT SUBQ TISS EACH ADDL: CPT | Performed by: SPECIALIST

## 2024-05-16 PROCEDURE — 87077 CULTURE AEROBIC IDENTIFY: CPT

## 2024-05-16 PROCEDURE — 29581 APPL MULTLAYER CMPRN SYS LEG: CPT

## 2024-05-16 PROCEDURE — 87186 SC STD MICRODIL/AGAR DIL: CPT

## 2024-05-16 RX ORDER — LIDOCAINE HYDROCHLORIDE 20 MG/ML
JELLY TOPICAL ONCE
OUTPATIENT
Start: 2024-05-16 | End: 2024-05-16

## 2024-05-16 RX ORDER — CLOBETASOL PROPIONATE 0.5 MG/G
OINTMENT TOPICAL ONCE
OUTPATIENT
Start: 2024-05-16 | End: 2024-05-16

## 2024-05-16 RX ORDER — LIDOCAINE HYDROCHLORIDE 40 MG/ML
SOLUTION TOPICAL ONCE
Status: COMPLETED | OUTPATIENT
Start: 2024-05-16 | End: 2024-05-16

## 2024-05-16 RX ORDER — GINSENG 100 MG
CAPSULE ORAL ONCE
OUTPATIENT
Start: 2024-05-16 | End: 2024-05-16

## 2024-05-16 RX ORDER — SODIUM CHLOR/HYPOCHLOROUS ACID 0.033 %
SOLUTION, IRRIGATION IRRIGATION ONCE
OUTPATIENT
Start: 2024-05-16 | End: 2024-05-16

## 2024-05-16 RX ORDER — LIDOCAINE 50 MG/G
OINTMENT TOPICAL ONCE
OUTPATIENT
Start: 2024-05-16 | End: 2024-05-16

## 2024-05-16 RX ORDER — TRIAMCINOLONE ACETONIDE 1 MG/G
OINTMENT TOPICAL ONCE
OUTPATIENT
Start: 2024-05-16 | End: 2024-05-16

## 2024-05-16 RX ORDER — BACITRACIN ZINC AND POLYMYXIN B SULFATE 500; 1000 [USP'U]/G; [USP'U]/G
OINTMENT TOPICAL ONCE
OUTPATIENT
Start: 2024-05-16 | End: 2024-05-16

## 2024-05-16 RX ORDER — LIDOCAINE HYDROCHLORIDE 40 MG/ML
SOLUTION TOPICAL ONCE
OUTPATIENT
Start: 2024-05-16 | End: 2024-05-16

## 2024-05-16 RX ORDER — GENTAMICIN SULFATE 1 MG/G
OINTMENT TOPICAL ONCE
OUTPATIENT
Start: 2024-05-16 | End: 2024-05-16

## 2024-05-16 RX ORDER — IBUPROFEN 200 MG
TABLET ORAL ONCE
OUTPATIENT
Start: 2024-05-16 | End: 2024-05-16

## 2024-05-16 RX ORDER — SODIUM CHLOR/HYPOCHLOROUS ACID 0.033 %
SOLUTION, IRRIGATION IRRIGATION ONCE
Status: COMPLETED | OUTPATIENT
Start: 2024-05-16 | End: 2024-05-16

## 2024-05-16 RX ORDER — BETAMETHASONE DIPROPIONATE 0.05 %
OINTMENT (GRAM) TOPICAL ONCE
OUTPATIENT
Start: 2024-05-16 | End: 2024-05-16

## 2024-05-16 RX ORDER — LIDOCAINE 40 MG/G
CREAM TOPICAL ONCE
OUTPATIENT
Start: 2024-05-16 | End: 2024-05-16

## 2024-05-16 RX ADMIN — Medication: at 08:42

## 2024-05-16 RX ADMIN — LIDOCAINE HYDROCHLORIDE: 40 SOLUTION TOPICAL at 08:37

## 2024-05-16 ASSESSMENT — PAIN DESCRIPTION - ORIENTATION: ORIENTATION: RIGHT

## 2024-05-16 ASSESSMENT — PAIN DESCRIPTION - PAIN TYPE: TYPE: CHRONIC PAIN

## 2024-05-16 ASSESSMENT — PAIN DESCRIPTION - LOCATION: LOCATION: LEG

## 2024-05-16 ASSESSMENT — PAIN SCALES - GENERAL: PAINLEVEL_OUTOF10: 8

## 2024-05-16 ASSESSMENT — PAIN DESCRIPTION - DESCRIPTORS: DESCRIPTORS: BURNING

## 2024-05-16 NOTE — PROGRESS NOTES
Multilayer Compression Wrap   (Not Unna) Below the Knee    NAME:  Justin Baeza  YOB: 1965  MEDICAL RECORD NUMBER:  [id number]  DATE:  5/16/2024       Removed old Multilayer wrap if present and washed leg with mild soap/water.   Applied moisturizing agent to dry skin as needed.    Applied primary and secondary dressing as ordered    Applied multilayered dressing below the knee to Right lower leg(s)  (4 Layer Compression Wrap ) .    Instructed patient/caregiver not to remove dressing and to keep it clean and dry.    Instructed patient/caregiver on complications to report to provider, such as pain, numbness in toes, heavy drainage, and slippage of dressing.    Instructed patient on purpose of compression dressing and on activity and exercise recommendations.   Applied per   Guidelines    Electronically signed by Kathy Gomez RN on 5/16/2024 at 8:52 AM      
0 cm 05/13/24 0823   Tunneling Position ___ O'Clock 0 04/18/24 0828   Undermining Starts ___ O'Clock 0 04/18/24 0828   Undermining Ends___ O'Clock 0 04/18/24 0828   Undermining Maxium Distance (cm) 0 05/13/24 0823   Wound Assessment Fibrin;Pink/red 05/16/24 0820   Drainage Amount Moderate (25-50%) 05/16/24 0820   Drainage Description Serosanguinous;Brown 05/16/24 0820   Odor Mild 05/16/24 0820   Kiah-wound Assessment Intact;Maceration 05/16/24 0820   Margins Defined edges 05/16/24 0820   Wound Thickness Description not for Pressure Injury Full thickness 05/16/24 0820   Number of days: 132          Percent of Wound Debrided: 100%    Total Surface Area Debrided:  50 sq cm    Diabetic/Pressure/Non Pressure Ulcers only:  Ulcer: Non-Pressure ulcer, fat layer exposed    Bleeding: Minimal    Hemostasis Achieved: by pressure    Procedural Pain: 0  / 10     Post Procedural Pain: 0 / 10     Response to treatment:  Well tolerated by patient.         Plan:   Will continue with PolyMem dressing with 4-layer compression and again strongly advised patient to try to utilize his lymphedema pumps as well as elevation of his right lower extremity as much as possible.  Will reculture wound today to assess antibiotic therapy  Treatment Note: Please see attached Discharge Instructions.  These instructions were given and signed by the patient or POA    New Prescriptions    No medications on file       Orders Placed This Encounter   Procedures    Initiate Outpatient Wound Care Protocol    Culture, Tissue         Patient Instructions    Wound Care Center Physician Orders and Discharge Instructions  [facility]   [facility] [address] [place]  Telephone: [phone] FAX [phone]  NAME:  Justin Baeza  YOB: 1965  MEDICAL RECORD NUMBER:  [id number]  DATE: 5/16/2024      Return Appointment:  Return Appointment: With Khang Giles MD  in  1 Week(s)  [x] Return Appointment for a Wound

## 2024-05-16 NOTE — PATIENT INSTRUCTIONS
Layer Compression Wrap    Do not get leg(s) with wrap wet.  If wraps become too tight call the center or completely remove the wrap.   Elevate leg(s) above the level of the heart when sitting.  Avoid prolonged standing in one place.   Applied in Clinic on 5/16/2024  The Goal of this therapy is to reduce edema and get into long term compression garments to control venous insufficiency, lymphedema and reduce occurrence of venous ulcers    [x] Edema Control: 30-40mmHG  Apply: Compression Stocking on the Left leg  Apply every morning immediately when getting up. Remove every night before going to bed unless instructed otherwise     Elevate leg(s) above the level of the heart for 30 minutes 4-5 times a day and/or when sitting.  Avoid prolonged standing in one place.    [x] Lymphedema Therapy:  Wear Lymphedema pumps twice a day at settings prescribed by your physician.   Avoid prolonged standing in one place.      Dietary:  Important dietary reminders:  1. Increase Protein intake (i.e. Lean meats, fish, eggs, legumes, and yogurt)  2. No added salt  3. If diabetic, follow a diabetic diet and check glucose prior to meals or as instructed by your physician.    Dietary Supplements(Take twice a day unless instructed otherwise):  [] Papito  [] 30ml ProStat [] Ensure Complete [] Ensure Max/Premier [] Expedite [] Other:    Your nurse  is:  talisha     Electronically signed by Talisha Hawkins RN on 5/16/2024 at 8:44 AM     Wound Care Center Information: Should you experience any significant changes in your wound(s) or have questions about your wound care, please contact the [facility] at [phone].   Hours of operation:  Mon:  8AM - 2PM  Tue: 11AM - 5PM  Wed: CLOSED  Thur: 8AM - 4:30PM  Fri:  8AM - 4:30PM  The office is closed on all major holidays.    Please give us 24-48 business hours to return your call.  These hours of operation are subject to change. If you need help with your wounds and

## 2024-05-23 ENCOUNTER — HOSPITAL ENCOUNTER (OUTPATIENT)
Dept: WOUND CARE | Age: 59
Discharge: HOME | End: 2024-05-23
Attending: SPECIALIST
Payer: COMMERCIAL

## 2024-05-23 VITALS
RESPIRATION RATE: 16 BRPM | HEART RATE: 68 BPM | TEMPERATURE: 97 F | SYSTOLIC BLOOD PRESSURE: 144 MMHG | DIASTOLIC BLOOD PRESSURE: 80 MMHG

## 2024-05-23 DIAGNOSIS — I87.331 IDIOPATHIC CHRONIC VENOUS HYPERTENSION OF RIGHT LOWER EXTREMITY WITH ULCER AND INFLAMMATION (HCC): ICD-10-CM

## 2024-05-23 DIAGNOSIS — I89.0 LYMPHEDEMA: ICD-10-CM

## 2024-05-23 DIAGNOSIS — I89.0 CHRONIC ACQUIRED LYMPHEDEMA: Primary | ICD-10-CM

## 2024-05-23 DIAGNOSIS — I83.009 VENOUS ULCER (HCC): ICD-10-CM

## 2024-05-23 DIAGNOSIS — Z91.199 NONCOMPLIANCE: ICD-10-CM

## 2024-05-23 DIAGNOSIS — L97.909 VENOUS ULCER (HCC): ICD-10-CM

## 2024-05-23 PROCEDURE — 11042 DBRDMT SUBQ TIS 1ST 20SQCM/<: CPT

## 2024-05-23 PROCEDURE — 11045 DBRDMT SUBQ TISS EACH ADDL: CPT

## 2024-05-23 PROCEDURE — 6370000000 HC RX 637 (ALT 250 FOR IP): Performed by: SPECIALIST

## 2024-05-23 PROCEDURE — 11045 DBRDMT SUBQ TISS EACH ADDL: CPT | Performed by: SPECIALIST

## 2024-05-23 PROCEDURE — 29581 APPL MULTLAYER CMPRN SYS LEG: CPT

## 2024-05-23 PROCEDURE — 11042 DBRDMT SUBQ TIS 1ST 20SQCM/<: CPT | Performed by: SPECIALIST

## 2024-05-23 RX ORDER — SODIUM CHLOR/HYPOCHLOROUS ACID 0.033 %
SOLUTION, IRRIGATION IRRIGATION ONCE
OUTPATIENT
Start: 2024-05-23 | End: 2024-05-23

## 2024-05-23 RX ORDER — BACITRACIN ZINC AND POLYMYXIN B SULFATE 500; 1000 [USP'U]/G; [USP'U]/G
OINTMENT TOPICAL ONCE
OUTPATIENT
Start: 2024-05-23 | End: 2024-05-23

## 2024-05-23 RX ORDER — LIDOCAINE 40 MG/G
CREAM TOPICAL ONCE
OUTPATIENT
Start: 2024-05-23 | End: 2024-05-23

## 2024-05-23 RX ORDER — GENTAMICIN SULFATE 1 MG/G
OINTMENT TOPICAL ONCE
OUTPATIENT
Start: 2024-05-23 | End: 2024-05-23

## 2024-05-23 RX ORDER — LIDOCAINE HYDROCHLORIDE 20 MG/ML
JELLY TOPICAL ONCE
OUTPATIENT
Start: 2024-05-23 | End: 2024-05-23

## 2024-05-23 RX ORDER — LIDOCAINE 50 MG/G
OINTMENT TOPICAL ONCE
OUTPATIENT
Start: 2024-05-23 | End: 2024-05-23

## 2024-05-23 RX ORDER — IBUPROFEN 200 MG
TABLET ORAL ONCE
OUTPATIENT
Start: 2024-05-23 | End: 2024-05-23

## 2024-05-23 RX ORDER — TRIAMCINOLONE ACETONIDE 1 MG/G
OINTMENT TOPICAL ONCE
OUTPATIENT
Start: 2024-05-23 | End: 2024-05-23

## 2024-05-23 RX ORDER — CLOBETASOL PROPIONATE 0.5 MG/G
OINTMENT TOPICAL ONCE
OUTPATIENT
Start: 2024-05-23 | End: 2024-05-23

## 2024-05-23 RX ORDER — LIDOCAINE HYDROCHLORIDE 40 MG/ML
SOLUTION TOPICAL ONCE
Status: COMPLETED | OUTPATIENT
Start: 2024-05-23 | End: 2024-05-23

## 2024-05-23 RX ORDER — BETAMETHASONE DIPROPIONATE 0.05 %
OINTMENT (GRAM) TOPICAL ONCE
OUTPATIENT
Start: 2024-05-23 | End: 2024-05-23

## 2024-05-23 RX ORDER — GENTAMICIN SULFATE 1 MG/G
OINTMENT TOPICAL ONCE
Status: COMPLETED | OUTPATIENT
Start: 2024-05-23 | End: 2024-05-23

## 2024-05-23 RX ORDER — GINSENG 100 MG
CAPSULE ORAL ONCE
OUTPATIENT
Start: 2024-05-23 | End: 2024-05-23

## 2024-05-23 RX ORDER — LIDOCAINE HYDROCHLORIDE 40 MG/ML
SOLUTION TOPICAL ONCE
OUTPATIENT
Start: 2024-05-23 | End: 2024-05-23

## 2024-05-23 RX ADMIN — GENTAMICIN SULFATE: 1 OINTMENT TOPICAL at 09:11

## 2024-05-23 RX ADMIN — LIDOCAINE HYDROCHLORIDE: 40 SOLUTION TOPICAL at 09:11

## 2024-05-23 ASSESSMENT — PAIN DESCRIPTION - DESCRIPTORS
DESCRIPTORS: BURNING
DESCRIPTORS: BURNING

## 2024-05-23 ASSESSMENT — PAIN SCALES - GENERAL
PAINLEVEL_OUTOF10: 7
PAINLEVEL_OUTOF10: 7

## 2024-05-23 ASSESSMENT — PAIN DESCRIPTION - LOCATION
LOCATION: LEG
LOCATION: LEG

## 2024-05-23 ASSESSMENT — PAIN DESCRIPTION - ORIENTATION
ORIENTATION: RIGHT
ORIENTATION: RIGHT

## 2024-05-23 NOTE — PROGRESS NOTES
Multilayer Compression Wrap   (Not Unna) Below the Knee    NAME:  Justin Baeza  YOB: 1965  MEDICAL RECORD NUMBER:  [id number]  DATE:  5/23/2024       Removed old Multilayer wrap if present and washed leg with mild soap/water.   Applied moisturizing agent to dry skin as needed.    Applied primary and secondary dressing as ordered    Applied multilayered dressing below the knee to Right lower leg(s)  (4 Layer Compression Wrap ) .    Instructed patient/caregiver not to remove dressing and to keep it clean and dry.    Instructed patient/caregiver on complications to report to provider, such as pain, numbness in toes, heavy drainage, and slippage of dressing.    Instructed patient on purpose of compression dressing and on activity and exercise recommendations.   Applied per   Guidelines    Electronically signed by Kathy Gomez RN on 5/23/2024 at 9:09 AM      
Wrap:  Type: Applied on Right lower leg(s)  4 Layer Compression Wrap    Do not get leg(s) with wrap wet.  If wraps become too tight call the center or completely remove the wrap.   Elevate leg(s) above the level of the heart when sitting.  Avoid prolonged standing in one place.   Applied in Clinic on 5/23/2024  The Goal of this therapy is to reduce edema and get into long term compression garments to control venous insufficiency, lymphedema and reduce occurrence of venous ulcers    [x] Edema Control: 30-40mmHG  Apply: Compression Stocking on the Left leg  Apply every morning immediately when getting up. Remove every night before going to bed unless instructed otherwise     Elevate leg(s) above the level of the heart for 30 minutes 4-5 times a day and/or when sitting.  Avoid prolonged standing in one place.    [x] Lymphedema Therapy:  Wear Lymphedema pumps twice a day at settings prescribed by your physician.   Avoid prolonged standing in one place.      Dietary:  Important dietary reminders:  1. Increase Protein intake (i.e. Lean meats, fish, eggs, legumes, and yogurt)  2. No added salt  3. If diabetic, follow a diabetic diet and check glucose prior to meals or as instructed by your physician.    Dietary Supplements(Take twice a day unless instructed otherwise):  [] Papito  [] 30ml ProStat [] Ensure Complete [] Ensure Max/Premier [] Expedite [] Other:    Your nurse  is:  talisha     Electronically signed by Talisha Hawkins RN on 5/23/2024 at 8:38 AM     Wound Care Center Information: Should you experience any significant changes in your wound(s) or have questions about your wound care, please contact the [facility] at [phone].   Hours of operation:  Mon:  8AM - 2PM  Tue: 11AM - 5PM  Wed: CLOSED  Thur: 8AM - 4:30PM  Fri:  8AM - 4:30PM  The office is closed on all major holidays.    Please give us 24-48 business hours to return your call.  These hours of operation are subject to

## 2024-05-23 NOTE — PATIENT INSTRUCTIONS
Wound Care Center Physician Orders and Discharge Instructions  [facility]   [address] [place]  Telephone: [phone] FAX [phone]  NAME:  Justin Baeza  YOB: 1965  MEDICAL RECORD NUMBER:  [id number]  DATE: 5/23/2024      Return Appointment:  Return Appointment: With Khang Giels MD  in  1 Week(s)  [x] Return Appointment for a Wound Assessment with the nurse on:      No future appointments.        Orders Placed This Encounter   Procedures    Initiate Outpatient Wound Care Protocol       Home Care Company: none    Medically necessary services for evaluation and treatment: []Skilled Nursing (using clean technique) []PT (Eval & Treat) []OT (Eval & Treat) []Social Work []Dietician []Other:      Wound care instructions:  If you smoke we ask that you refrain from smoking. Smoking inhibits wounds from healing.  When taking antibiotics take the entire prescription as ordered. Do not stop taking until medication is all gone unless otherwise instructed.   Exercise as tolerated.   Keep weight off wounds and reposition every 2 hours if applicable.  Do not get wounds wet in the bath or shower unless otherwise instructed by your physician. If your wound is on your foot or leg, you may purchase a cast bag. Please ask at the pharmacy.  Wash hands with soap and water prior to and after every dressing change.    [x]Wash wounds with: No need to wash. Leave dressing in place.    [x]Kiah wound Topical Treatments: Do not apply lotions, creams, or ointments to the skin around the wound bed unless directed as followed:   Apply around the wound: zinc paste          [x]Wound Location: right lower all medial leg wounds  Apply Primary Dressing to wound: gentamicin ointment- thin layer  Secondary Dressing: Extra absorbant pad   Avoid contact of tape with skin if possible.  When to change Dressing: DO NOT CHANGE      [x]Wound Location: right lower anterior and posterior

## 2024-05-29 ENCOUNTER — TELEPHONE (OUTPATIENT)
Dept: INFECTIOUS DISEASES | Age: 59
End: 2024-05-29

## 2024-05-29 DIAGNOSIS — L03.115 CELLULITIS OF RIGHT LEG: Primary | ICD-10-CM

## 2024-05-29 DIAGNOSIS — A49.8 PSEUDOMONAS INFECTION: ICD-10-CM

## 2024-05-29 NOTE — TELEPHONE ENCOUNTER
Called pt and he called back --    Cult sent 5/16 light E cloacae, Ps aerug, E faecalis.  R leg + increase pain  Moderate drainage, no odor    Discussed with pt - iv (Zosyn, covers all bacteria including Pseudomonas)            Po (Augmentin, does not cover Pseudomonas)  Having significant pain, need to treat more agfressively    Will start iv Zosyn  See KOFFI    INFUSION ORDERS:  Zosyn 13.5 gm iv daily through 6/13/24  - Diagnosis - R leg cellulitis   - First dose given in hospital  - PICC   - Disposition / date discharge  - Check CBC w diff, CMP, ESR, CRP every Mon or Tue - FAX result to [phone]  - Call with antibiotic / infusion issues, [phone]  - No f/u in outpatient ID office necessary

## 2024-05-29 NOTE — TELEPHONE ENCOUNTER
Reached out to Jinny,  with [facility], and gave her pt demographic and requested IV ATB and stop date  Let her know I am waiting on a call back from our PICC nurse to confirm day and time for PICC placement    Left vm for pt to let him know his picc is scheduled for Friday at 10 am at [facility]  My name, title, Dr Agustin's name, specialty and affiliation  Pt 1st name   My direct number [phone]  Office fax [phone]    5/30/24 1230  Spoke with Margareth, pharmacist with [facility], and gave verbal opat orders -  Zosyn 13.5 gm iv daily through 6/13/24  Check CBC w diff, CMP, ESR, CRP every Mon or Tue - FAX result to [phone]  Pt is having PICC placed tomorrow and this is not a first dose  Margareth RBVO correctly

## 2024-05-30 ENCOUNTER — HOSPITAL ENCOUNTER (OUTPATIENT)
Dept: WOUND CARE | Age: 59
Discharge: HOME | End: 2024-05-30
Attending: SPECIALIST
Payer: COMMERCIAL

## 2024-05-30 VITALS
DIASTOLIC BLOOD PRESSURE: 82 MMHG | RESPIRATION RATE: 16 BRPM | SYSTOLIC BLOOD PRESSURE: 144 MMHG | HEART RATE: 68 BPM | TEMPERATURE: 97.8 F

## 2024-05-30 DIAGNOSIS — I87.331 IDIOPATHIC CHRONIC VENOUS HYPERTENSION OF RIGHT LOWER EXTREMITY WITH ULCER AND INFLAMMATION (HCC): ICD-10-CM

## 2024-05-30 DIAGNOSIS — I89.0 LYMPHEDEMA: ICD-10-CM

## 2024-05-30 DIAGNOSIS — Z91.199 NONCOMPLIANCE: ICD-10-CM

## 2024-05-30 DIAGNOSIS — L97.909 VENOUS ULCER (HCC): ICD-10-CM

## 2024-05-30 DIAGNOSIS — I89.0 CHRONIC ACQUIRED LYMPHEDEMA: Primary | ICD-10-CM

## 2024-05-30 DIAGNOSIS — I83.009 VENOUS ULCER (HCC): ICD-10-CM

## 2024-05-30 PROCEDURE — 6370000000 HC RX 637 (ALT 250 FOR IP): Performed by: SPECIALIST

## 2024-05-30 PROCEDURE — 29581 APPL MULTLAYER CMPRN SYS LEG: CPT

## 2024-05-30 PROCEDURE — 11045 DBRDMT SUBQ TISS EACH ADDL: CPT | Performed by: SPECIALIST

## 2024-05-30 PROCEDURE — 11045 DBRDMT SUBQ TISS EACH ADDL: CPT

## 2024-05-30 PROCEDURE — 11042 DBRDMT SUBQ TIS 1ST 20SQCM/<: CPT | Performed by: SPECIALIST

## 2024-05-30 PROCEDURE — 11042 DBRDMT SUBQ TIS 1ST 20SQCM/<: CPT

## 2024-05-30 RX ORDER — LIDOCAINE HYDROCHLORIDE 20 MG/ML
JELLY TOPICAL ONCE
OUTPATIENT
Start: 2024-05-30 | End: 2024-05-30

## 2024-05-30 RX ORDER — LIDOCAINE HYDROCHLORIDE 40 MG/ML
SOLUTION TOPICAL ONCE
OUTPATIENT
Start: 2024-05-30 | End: 2024-05-30

## 2024-05-30 RX ORDER — IBUPROFEN 200 MG
TABLET ORAL ONCE
OUTPATIENT
Start: 2024-05-30 | End: 2024-05-30

## 2024-05-30 RX ORDER — GENTAMICIN SULFATE 1 MG/G
OINTMENT TOPICAL ONCE
Status: COMPLETED | OUTPATIENT
Start: 2024-05-30 | End: 2024-05-30

## 2024-05-30 RX ORDER — LIDOCAINE HYDROCHLORIDE 40 MG/ML
SOLUTION TOPICAL ONCE
Status: COMPLETED | OUTPATIENT
Start: 2024-05-30 | End: 2024-05-30

## 2024-05-30 RX ORDER — SODIUM CHLOR/HYPOCHLOROUS ACID 0.033 %
SOLUTION, IRRIGATION IRRIGATION ONCE
OUTPATIENT
Start: 2024-05-30 | End: 2024-05-30

## 2024-05-30 RX ORDER — BACITRACIN ZINC AND POLYMYXIN B SULFATE 500; 1000 [USP'U]/G; [USP'U]/G
OINTMENT TOPICAL ONCE
OUTPATIENT
Start: 2024-05-30 | End: 2024-05-30

## 2024-05-30 RX ORDER — LIDOCAINE 40 MG/G
CREAM TOPICAL ONCE
OUTPATIENT
Start: 2024-05-30 | End: 2024-05-30

## 2024-05-30 RX ORDER — GINSENG 100 MG
CAPSULE ORAL ONCE
OUTPATIENT
Start: 2024-05-30 | End: 2024-05-30

## 2024-05-30 RX ORDER — GENTAMICIN SULFATE 1 MG/G
OINTMENT TOPICAL ONCE
OUTPATIENT
Start: 2024-05-30 | End: 2024-05-30

## 2024-05-30 RX ORDER — LIDOCAINE 50 MG/G
OINTMENT TOPICAL ONCE
OUTPATIENT
Start: 2024-05-30 | End: 2024-05-30

## 2024-05-30 RX ORDER — TRIAMCINOLONE ACETONIDE 1 MG/G
OINTMENT TOPICAL ONCE
OUTPATIENT
Start: 2024-05-30 | End: 2024-05-30

## 2024-05-30 RX ORDER — CLOBETASOL PROPIONATE 0.5 MG/G
OINTMENT TOPICAL ONCE
OUTPATIENT
Start: 2024-05-30 | End: 2024-05-30

## 2024-05-30 RX ORDER — BETAMETHASONE DIPROPIONATE 0.05 %
OINTMENT (GRAM) TOPICAL ONCE
OUTPATIENT
Start: 2024-05-30 | End: 2024-05-30

## 2024-05-30 RX ADMIN — GENTAMICIN SULFATE: 1 OINTMENT TOPICAL at 10:05

## 2024-05-30 RX ADMIN — LIDOCAINE HYDROCHLORIDE: 40 SOLUTION TOPICAL at 08:40

## 2024-05-30 ASSESSMENT — PAIN DESCRIPTION - LOCATION
LOCATION: LEG
LOCATION: LEG

## 2024-05-30 ASSESSMENT — PAIN DESCRIPTION - PAIN TYPE: TYPE: CHRONIC PAIN

## 2024-05-30 ASSESSMENT — PAIN DESCRIPTION - DESCRIPTORS
DESCRIPTORS: BURNING
DESCRIPTORS: BURNING

## 2024-05-30 ASSESSMENT — PAIN SCALES - GENERAL
PAINLEVEL_OUTOF10: 6
PAINLEVEL_OUTOF10: 6

## 2024-05-30 ASSESSMENT — PAIN DESCRIPTION - ORIENTATION
ORIENTATION: RIGHT
ORIENTATION: RIGHT

## 2024-05-30 NOTE — PROGRESS NOTES
Multilayer Compression Wrap   (Not Unna) Below the Knee    NAME:  Justin Baeza  YOB: 1965  MEDICAL RECORD NUMBER:  [id number]  DATE:  5/30/2024       Removed old Multilayer wrap if present and washed leg with mild soap/water.   Applied moisturizing agent to dry skin as needed.    Applied primary and secondary dressing as ordered    Applied multilayered dressing below the knee to Right lower leg(s)  (4 Layer Compression Wrap ) .    Instructed patient/caregiver not to remove dressing and to keep it clean and dry.    Instructed patient/caregiver on complications to report to provider, such as pain, numbness in toes, heavy drainage, and slippage of dressing.    Instructed patient on purpose of compression dressing and on activity and exercise recommendations.   Applied per   Guidelines    Electronically signed by Kathy Gomez RN on 5/30/2024 at 10:04 AM      
give us 24-48 business hours to return your call.  These hours of operation are subject to change. If you need help with your wounds and cannot wait until we are available, contact your PCP or go to your preferred emergency room.     Call your doctor now or seek immediate medical care if:    You have symptoms of infection, such as:  Increased pain, swelling, warmth, or redness.  Red streaks leading from the area.  Pus draining from the area.  A fever.       Electronically signed by CHICO VELIZ MD on 5/30/2024 at 10:13 AM

## 2024-05-30 NOTE — PATIENT INSTRUCTIONS
pad   Avoid contact of tape with skin if possible.  When to change Dressing: DO NOT CHANGE      [x]Wound Location: right lower anterior and posterior leg wounds  Apply Primary Dressing to wound: poly mem silver  Secondary Dressing: Extra absorbant pad   Avoid contact of tape with skin if possible.  When to change Dressing: DO NOT CHANGE    [x] Multilayer Compression Wrap:  Type: Applied on Right lower leg(s)  4 Layer Compression Wrap    Do not get leg(s) with wrap wet.  If wraps become too tight call the center or completely remove the wrap.   Elevate leg(s) above the level of the heart when sitting.  Avoid prolonged standing in one place.   Applied in Clinic on 5/30/2024  The Goal of this therapy is to reduce edema and get into long term compression garments to control venous insufficiency, lymphedema and reduce occurrence of venous ulcers    [x] Edema Control: 30-40mmHG  Apply: Compression Stocking on the Left leg  Apply every morning immediately when getting up. Remove every night before going to bed unless instructed otherwise     Elevate leg(s) above the level of the heart for 30 minutes 4-5 times a day and/or when sitting.  Avoid prolonged standing in one place.    [x] Lymphedema Therapy:  Wear Lymphedema pumps twice a day at settings prescribed by your physician.   Avoid prolonged standing in one place.      Dietary:  Important dietary reminders:  1. Increase Protein intake (i.e. Lean meats, fish, eggs, legumes, and yogurt)  2. No added salt  3. If diabetic, follow a diabetic diet and check glucose prior to meals or as instructed by your physician.    Dietary Supplements(Take twice a day unless instructed otherwise):  [] Papito  [] 30ml ProStat [] Ensure Complete [] Ensure Max/Premier [] Expedite [] Other:    Your nurse  is:  talisha     Electronically signed by Talisha Hawkins RN on 5/30/2024 at 9:26 AM     Wound Care Center Information: Should you experience any significant changes in your wound(s)

## 2024-05-30 NOTE — TELEPHONE ENCOUNTER
N/s letter #1 sent.     Mia Keita, PharmD, BCACP  [facility]   [facility] [place] Ph: [phone]  [facility] John[place] Ph: [phone]  5/30/2024 11:35 AM

## 2024-05-31 ENCOUNTER — HOSPITAL ENCOUNTER (OUTPATIENT)
Dept: ONCOLOGY | Age: 59
Setting detail: INFUSION SERIES
Discharge: HOME | End: 2024-05-31
Payer: COMMERCIAL

## 2024-05-31 DIAGNOSIS — L03.115 CELLULITIS OF RIGHT LEG: ICD-10-CM

## 2024-05-31 LAB
ALBUMIN SERPL-MCNC: 4.1 G/DL (ref 3.4–5)
ALBUMIN/GLOB SERPL: 1.3 {RATIO} (ref 1.1–2.2)
ALP SERPL-CCNC: 81 U/L (ref 40–129)
ALT SERPL-CCNC: 16 U/L (ref 10–40)
ANION GAP SERPL CALCULATED.3IONS-SCNC: 10 MMOL/L (ref 3–16)
AST SERPL-CCNC: 17 U/L (ref 15–37)
BASOPHILS # BLD: 0 K/UL (ref 0–0.2)
BASOPHILS NFR BLD: 0.1 %
BILIRUB SERPL-MCNC: 0.5 MG/DL (ref 0–1)
BUN SERPL-MCNC: 19 MG/DL (ref 7–20)
CALCIUM SERPL-MCNC: 8.5 MG/DL (ref 8.3–10.6)
CHLORIDE SERPL-SCNC: 105 MMOL/L (ref 99–110)
CO2 SERPL-SCNC: 25 MMOL/L (ref 21–32)
CREAT SERPL-MCNC: 0.9 MG/DL (ref 0.9–1.3)
CRP SERPL-MCNC: 19.9 MG/L (ref 0–5.1)
DEPRECATED RDW RBC AUTO: 16.2 % (ref 12.4–15.4)
EOSINOPHIL # BLD: 0.2 K/UL (ref 0–0.6)
EOSINOPHIL NFR BLD: 3.6 %
ERYTHROCYTE [SEDIMENTATION RATE] IN BLOOD BY WESTERGREN METHOD: 15 MM/HR (ref 0–20)
GFR SERPLBLD CREATININE-BSD FMLA CKD-EPI: >90 ML/MIN/{1.73_M2}
GLUCOSE SERPL-MCNC: 89 MG/DL (ref 70–99)
HCT VFR BLD AUTO: 38.7 % (ref 40.5–52.5)
HGB BLD-MCNC: 12.9 G/DL (ref 13.5–17.5)
LYMPHOCYTES # BLD: 1.9 K/UL (ref 1–5.1)
LYMPHOCYTES NFR BLD: 36.7 %
MCH RBC QN AUTO: 27.4 PG (ref 26–34)
MCHC RBC AUTO-ENTMCNC: 33.2 G/DL (ref 31–36)
MCV RBC AUTO: 82.5 FL (ref 80–100)
MONOCYTES # BLD: 0.5 K/UL (ref 0–1.3)
MONOCYTES NFR BLD: 10.3 %
NEUTROPHILS # BLD: 2.5 K/UL (ref 1.7–7.7)
NEUTROPHILS NFR BLD: 49.3 %
PLATELET # BLD AUTO: 204 K/UL (ref 135–450)
PMV BLD AUTO: 6.7 FL (ref 5–10.5)
POTASSIUM SERPL-SCNC: 4.6 MMOL/L (ref 3.5–5.1)
PROT SERPL-MCNC: 7.2 G/DL (ref 6.4–8.2)
RBC # BLD AUTO: 4.69 M/UL (ref 4.2–5.9)
SODIUM SERPL-SCNC: 140 MMOL/L (ref 136–145)
WBC # BLD AUTO: 5.1 K/UL (ref 4–11)

## 2024-05-31 PROCEDURE — 85025 COMPLETE CBC W/AUTO DIFF WBC: CPT

## 2024-05-31 PROCEDURE — 2500000003 HC RX 250 WO HCPCS: Performed by: INTERNAL MEDICINE

## 2024-05-31 PROCEDURE — 85652 RBC SED RATE AUTOMATED: CPT

## 2024-05-31 PROCEDURE — 80053 COMPREHEN METABOLIC PANEL: CPT

## 2024-05-31 PROCEDURE — 86140 C-REACTIVE PROTEIN: CPT

## 2024-05-31 RX ORDER — SODIUM CHLORIDE 0.9 % (FLUSH) 0.9 %
5-40 SYRINGE (ML) INJECTION PRN
OUTPATIENT
Start: 2024-06-07

## 2024-05-31 RX ORDER — HEPARIN 100 UNIT/ML
500 SYRINGE INTRAVENOUS PRN
OUTPATIENT
Start: 2024-06-07

## 2024-05-31 RX ORDER — SODIUM CHLORIDE 9 MG/ML
25 INJECTION, SOLUTION INTRAVENOUS PRN
OUTPATIENT
Start: 2024-06-07

## 2024-05-31 RX ORDER — LIDOCAINE HYDROCHLORIDE 10 MG/ML
5 INJECTION, SOLUTION EPIDURAL; INFILTRATION; INTRACAUDAL; PERINEURAL ONCE
Status: COMPLETED | OUTPATIENT
Start: 2024-05-31 | End: 2024-05-31

## 2024-05-31 RX ADMIN — LIDOCAINE HYDROCHLORIDE 5 ML: 10 INJECTION, SOLUTION EPIDURAL; INFILTRATION; INTRACAUDAL; PERINEURAL at 10:45

## 2024-05-31 NOTE — PROCEDURES
in up position, call button in reach, RN notified of all of the above.   A Power Midline CM placed in the JEFE Brachial vein. 0 cm showing from insertion site.

## 2024-06-05 ENCOUNTER — HOSPITAL ENCOUNTER (OUTPATIENT)
Age: 59
Setting detail: SPECIMEN
Discharge: HOME | End: 2024-06-05
Payer: COMMERCIAL

## 2024-06-05 ENCOUNTER — TELEPHONE (OUTPATIENT)
Dept: INFECTIOUS DISEASES | Age: 59
End: 2024-06-05

## 2024-06-05 LAB
ALBUMIN SERPL-MCNC: 4 G/DL (ref 3.4–5)
ALBUMIN/GLOB SERPL: 1.4 {RATIO} (ref 1.1–2.2)
ALP SERPL-CCNC: 72 U/L (ref 40–129)
ALT SERPL-CCNC: 37 U/L (ref 10–40)
ANION GAP SERPL CALCULATED.3IONS-SCNC: 11 MMOL/L (ref 3–16)
AST SERPL-CCNC: 36 U/L (ref 15–37)
BASOPHILS # BLD: 0 K/UL (ref 0–0.2)
BASOPHILS NFR BLD: 0.2 %
BILIRUB SERPL-MCNC: 0.6 MG/DL (ref 0–1)
BUN SERPL-MCNC: 17 MG/DL (ref 7–20)
CALCIUM SERPL-MCNC: 8.4 MG/DL (ref 8.3–10.6)
CHLORIDE SERPL-SCNC: 105 MMOL/L (ref 99–110)
CO2 SERPL-SCNC: 24 MMOL/L (ref 21–32)
CREAT SERPL-MCNC: 1 MG/DL (ref 0.9–1.3)
CRP SERPL-MCNC: 19.7 MG/L (ref 0–5.1)
DEPRECATED RDW RBC AUTO: 16.8 % (ref 12.4–15.4)
EOSINOPHIL # BLD: 0.3 K/UL (ref 0–0.6)
EOSINOPHIL NFR BLD: 7.5 %
ERYTHROCYTE [SEDIMENTATION RATE] IN BLOOD BY WESTERGREN METHOD: 17 MM/HR (ref 0–20)
GFR SERPLBLD CREATININE-BSD FMLA CKD-EPI: 87 ML/MIN/{1.73_M2}
GLUCOSE SERPL-MCNC: 180 MG/DL (ref 70–99)
HCT VFR BLD AUTO: 36.2 % (ref 40.5–52.5)
HGB BLD-MCNC: 12.2 G/DL (ref 13.5–17.5)
LYMPHOCYTES # BLD: 0.9 K/UL (ref 1–5.1)
LYMPHOCYTES NFR BLD: 25.5 %
MCH RBC QN AUTO: 28 PG (ref 26–34)
MCHC RBC AUTO-ENTMCNC: 33.7 G/DL (ref 31–36)
MCV RBC AUTO: 83.1 FL (ref 80–100)
MONOCYTES # BLD: 0.3 K/UL (ref 0–1.3)
MONOCYTES NFR BLD: 9.2 %
NEUTROPHILS # BLD: 2 K/UL (ref 1.7–7.7)
NEUTROPHILS NFR BLD: 57.6 %
PLATELET # BLD AUTO: 183 K/UL (ref 135–450)
PMV BLD AUTO: 6.7 FL (ref 5–10.5)
POTASSIUM SERPL-SCNC: 4.1 MMOL/L (ref 3.5–5.1)
PROT SERPL-MCNC: 6.8 G/DL (ref 6.4–8.2)
RBC # BLD AUTO: 4.36 M/UL (ref 4.2–5.9)
SODIUM SERPL-SCNC: 140 MMOL/L (ref 136–145)
WBC # BLD AUTO: 3.5 K/UL (ref 4–11)

## 2024-06-05 PROCEDURE — 86140 C-REACTIVE PROTEIN: CPT

## 2024-06-05 PROCEDURE — 85652 RBC SED RATE AUTOMATED: CPT

## 2024-06-05 PROCEDURE — 36415 COLL VENOUS BLD VENIPUNCTURE: CPT

## 2024-06-05 PROCEDURE — 80053 COMPREHEN METABOLIC PANEL: CPT

## 2024-06-05 PROCEDURE — 85025 COMPLETE CBC W/AUTO DIFF WBC: CPT

## 2024-06-06 ENCOUNTER — HOSPITAL ENCOUNTER (OUTPATIENT)
Dept: WOUND CARE | Age: 59
Discharge: HOME | End: 2024-06-06
Attending: SPECIALIST
Payer: COMMERCIAL

## 2024-06-06 VITALS — DIASTOLIC BLOOD PRESSURE: 69 MMHG | SYSTOLIC BLOOD PRESSURE: 144 MMHG | RESPIRATION RATE: 18 BRPM | HEART RATE: 78 BPM

## 2024-06-06 DIAGNOSIS — Z91.199 NONCOMPLIANCE: ICD-10-CM

## 2024-06-06 DIAGNOSIS — I89.0 CHRONIC ACQUIRED LYMPHEDEMA: Primary | ICD-10-CM

## 2024-06-06 DIAGNOSIS — I87.331 IDIOPATHIC CHRONIC VENOUS HYPERTENSION OF RIGHT LOWER EXTREMITY WITH ULCER AND INFLAMMATION (HCC): ICD-10-CM

## 2024-06-06 DIAGNOSIS — I89.0 LYMPHEDEMA: ICD-10-CM

## 2024-06-06 DIAGNOSIS — I83.009 VENOUS ULCER (HCC): ICD-10-CM

## 2024-06-06 DIAGNOSIS — L97.909 VENOUS ULCER (HCC): ICD-10-CM

## 2024-06-06 PROCEDURE — 11042 DBRDMT SUBQ TIS 1ST 20SQCM/<: CPT | Performed by: SPECIALIST

## 2024-06-06 PROCEDURE — 29581 APPL MULTLAYER CMPRN SYS LEG: CPT

## 2024-06-06 PROCEDURE — 6370000000 HC RX 637 (ALT 250 FOR IP): Performed by: SPECIALIST

## 2024-06-06 PROCEDURE — 11045 DBRDMT SUBQ TISS EACH ADDL: CPT

## 2024-06-06 PROCEDURE — 11045 DBRDMT SUBQ TISS EACH ADDL: CPT | Performed by: SPECIALIST

## 2024-06-06 PROCEDURE — 11042 DBRDMT SUBQ TIS 1ST 20SQCM/<: CPT

## 2024-06-06 RX ORDER — GENTAMICIN SULFATE 1 MG/G
OINTMENT TOPICAL ONCE
OUTPATIENT
Start: 2024-06-06 | End: 2024-06-06

## 2024-06-06 RX ORDER — SODIUM CHLOR/HYPOCHLOROUS ACID 0.033 %
SOLUTION, IRRIGATION IRRIGATION ONCE
OUTPATIENT
Start: 2024-06-06 | End: 2024-06-06

## 2024-06-06 RX ORDER — SODIUM CHLOR/HYPOCHLOROUS ACID 0.033 %
SOLUTION, IRRIGATION IRRIGATION ONCE
Status: COMPLETED | OUTPATIENT
Start: 2024-06-06 | End: 2024-06-06

## 2024-06-06 RX ORDER — BETAMETHASONE DIPROPIONATE 0.05 %
OINTMENT (GRAM) TOPICAL ONCE
OUTPATIENT
Start: 2024-06-06 | End: 2024-06-06

## 2024-06-06 RX ORDER — GINSENG 100 MG
CAPSULE ORAL ONCE
OUTPATIENT
Start: 2024-06-06 | End: 2024-06-06

## 2024-06-06 RX ORDER — LIDOCAINE HYDROCHLORIDE 20 MG/ML
JELLY TOPICAL ONCE
OUTPATIENT
Start: 2024-06-06 | End: 2024-06-06

## 2024-06-06 RX ORDER — BACITRACIN ZINC AND POLYMYXIN B SULFATE 500; 1000 [USP'U]/G; [USP'U]/G
OINTMENT TOPICAL ONCE
OUTPATIENT
Start: 2024-06-06 | End: 2024-06-06

## 2024-06-06 RX ORDER — LIDOCAINE HYDROCHLORIDE 40 MG/ML
SOLUTION TOPICAL ONCE
OUTPATIENT
Start: 2024-06-06 | End: 2024-06-06

## 2024-06-06 RX ORDER — LIDOCAINE HYDROCHLORIDE 40 MG/ML
SOLUTION TOPICAL ONCE
Status: COMPLETED | OUTPATIENT
Start: 2024-06-06 | End: 2024-06-06

## 2024-06-06 RX ORDER — IBUPROFEN 200 MG
TABLET ORAL ONCE
OUTPATIENT
Start: 2024-06-06 | End: 2024-06-06

## 2024-06-06 RX ORDER — TRIAMCINOLONE ACETONIDE 1 MG/G
OINTMENT TOPICAL ONCE
OUTPATIENT
Start: 2024-06-06 | End: 2024-06-06

## 2024-06-06 RX ORDER — CLOBETASOL PROPIONATE 0.5 MG/G
OINTMENT TOPICAL ONCE
OUTPATIENT
Start: 2024-06-06 | End: 2024-06-06

## 2024-06-06 RX ORDER — LIDOCAINE 50 MG/G
OINTMENT TOPICAL ONCE
OUTPATIENT
Start: 2024-06-06 | End: 2024-06-06

## 2024-06-06 RX ORDER — LIDOCAINE 40 MG/G
CREAM TOPICAL ONCE
OUTPATIENT
Start: 2024-06-06 | End: 2024-06-06

## 2024-06-06 RX ADMIN — LIDOCAINE HYDROCHLORIDE: 40 SOLUTION TOPICAL at 08:35

## 2024-06-06 RX ADMIN — Medication: at 09:18

## 2024-06-06 ASSESSMENT — PAIN DESCRIPTION - ORIENTATION: ORIENTATION: RIGHT

## 2024-06-06 ASSESSMENT — PAIN DESCRIPTION - LOCATION: LOCATION: LEG

## 2024-06-06 ASSESSMENT — PAIN SCALES - GENERAL: PAINLEVEL_OUTOF10: 6

## 2024-06-06 ASSESSMENT — PAIN DESCRIPTION - DESCRIPTORS: DESCRIPTORS: BURNING

## 2024-06-06 NOTE — PROGRESS NOTES
Multilayer Compression Wrap   (Not Unna) Below the Knee    NAME:  Justin Baeza  YOB: 1965  MEDICAL RECORD NUMBER:  [id number]  DATE:  6/6/2024       Removed old Multilayer wrap if present and washed leg with mild soap/water.   Applied moisturizing agent to dry skin as needed.    Applied primary and secondary dressing as ordered    Applied multilayered dressing below the knee to Right lower leg(s)  (4 Layer Compression Wrap ) .    Instructed patient/caregiver not to remove dressing and to keep it clean and dry.    Instructed patient/caregiver on complications to report to provider, such as pain, numbness in toes, heavy drainage, and slippage of dressing.    Instructed patient on purpose of compression dressing and on activity and exercise recommendations.   Applied per   Guidelines    Electronically signed by Kathy oGmez RN on 6/6/2024 at 9:23 AM      
(COUMADIN) 5 MG tablet Take 7.5 mg (1.5 tablets) every day except take 5 mg (1 tablet) on Mondays and Fridays or as directed by the anticoagulation clinic (Patient taking differently: Take 1 tablet by mouth daily Take 7.5 mg (1.5 tablets) every day except take 5 mg (1 tablet) on Mondays and Fridays or as directed by the anticoagulation clinic) 135 tablet 3    ibuprofen (ADVIL;MOTRIN) 200 MG tablet Take 1 tablet by mouth every 6 hours as needed for Pain      Compression Stockings MISC by Does not apply route Strength 30-40mmHg  Style: Other pull up compression stockings  Extremity: bilateral  Donning aid recommended: No 1 each 0     No current facility-administered medications on file prior to encounter.       REVIEW OF SYSTEMS  Review of Systems    Pertinent items are noted in HPI.    Objective:      BP (!) 144/69   Pulse 78   Resp 18     Wt Readings from Last 3 Encounters:   08/30/23 112.8 kg (248 lb 9.6 oz)   08/08/23 116.2 kg (256 lb 3.2 oz)   07/24/23 114.6 kg (252 lb 10.4 oz)       PHYSICAL EXAM    General Appearance: alert and oriented to person, place and time and obese  Extremities: no cyanosis, no clubbing, 3 + edema-  right lower extremity, and full-thickness ulcerations containing preponderance of granulation tissue with fibrin minimal slough involving right medial right posterior and right anterior knee.  Periwound maceration posterior knee                  Assessment:      1. Chronic acquired lymphedema    2. Idiopathic chronic venous hypertension of right lower extremity with ulcer and inflammation (HCC)    3. Venous ulcer (HCC)    4. Noncompliance    5. Lymphedema         Procedure Note  Indications:  Based on my examination of this patient's wound(s) today, sharp excision is required to promote healing and evaluate the extent healing.    Performed by: CHICO VELIZ MD    Consent obtained? Yes    Time out taken: Yes    Pain Control: Anesthetic: 4% Lidocaine Liquid Topical

## 2024-06-06 NOTE — PATIENT INSTRUCTIONS
Wound Care Center Physician Orders and Discharge Instructions  [facility]   [address]  Telephone: [phone] FAX [phone]  NAME:  Justin Baeza  YOB: 1965  MEDICAL RECORD NUMBER:  [id number]  DATE: 6/6/2024      Return Appointment:  Return Appointment: With Khang Giles MD  in  1 Week(s)  [x] Return Appointment for a Wound Assessment with the nurse on:      No future appointments.          Orders Placed This Encounter   Procedures    Initiate Outpatient Wound Care Protocol       Home Care Company: none    Medically necessary services for evaluation and treatment: []Skilled Nursing (using clean technique) []PT (Eval & Treat) []OT (Eval & Treat) []Social Work []Dietician []Other:      Wound care instructions:  If you smoke we ask that you refrain from smoking. Smoking inhibits wounds from healing.  When taking antibiotics take the entire prescription as ordered. Do not stop taking until medication is all gone unless otherwise instructed.   Exercise as tolerated.   Keep weight off wounds and reposition every 2 hours if applicable.  Do not get wounds wet in the bath or shower unless otherwise instructed by your physician. If your wound is on your foot or leg, you may purchase a cast bag. Please ask at the pharmacy.  Wash hands with soap and water prior to and after every dressing change.    [x]Wash wounds with: No need to wash. Leave dressing in place.    [x]Kiah wound Topical Treatments: Do not apply lotions, creams, or ointments to the skin around the wound bed unless directed as followed:   Apply around the wound: zinc paste- done by dr. Giles in clinic          [x]Wound Location: right lower leg wounds  Apply Primary Dressing to wound: endoform covered by hydrofera blue ready  Secondary Dressing: Extra absorbant pad   Avoid contact of tape with skin if possible.  When to change Dressing: DO NOT CHANGE      [x] Multilayer

## 2024-06-12 ENCOUNTER — TELEPHONE (OUTPATIENT)
Dept: INFECTIOUS DISEASES | Age: 59
End: 2024-06-12

## 2024-06-12 LAB
ALBUMIN SERPL-MCNC: 3.9 G/DL (ref 3.4–5)
ALBUMIN/GLOB SERPL: 1.2 {RATIO} (ref 1.1–2.2)
ALP SERPL-CCNC: 78 U/L (ref 40–129)
ALT SERPL-CCNC: 35 U/L (ref 10–40)
ANION GAP SERPL CALCULATED.3IONS-SCNC: 11 MMOL/L (ref 3–16)
AST SERPL-CCNC: 24 U/L (ref 15–37)
BASOPHILS # BLD: 0 K/UL (ref 0–0.2)
BASOPHILS NFR BLD: 0.3 %
BILIRUB SERPL-MCNC: 0.5 MG/DL (ref 0–1)
BUN SERPL-MCNC: 19 MG/DL (ref 7–20)
CALCIUM SERPL-MCNC: 8.8 MG/DL (ref 8.3–10.6)
CHLORIDE SERPL-SCNC: 107 MMOL/L (ref 99–110)
CO2 SERPL-SCNC: 23 MMOL/L (ref 21–32)
CREAT SERPL-MCNC: 1 MG/DL (ref 0.9–1.3)
CRP SERPL-MCNC: 15.6 MG/L (ref 0–5.1)
DEPRECATED RDW RBC AUTO: 17.4 % (ref 12.4–15.4)
EOSINOPHIL # BLD: 0.2 K/UL (ref 0–0.6)
EOSINOPHIL NFR BLD: 5.6 %
ERYTHROCYTE [SEDIMENTATION RATE] IN BLOOD BY WESTERGREN METHOD: 14 MM/HR (ref 0–20)
GFR SERPLBLD CREATININE-BSD FMLA CKD-EPI: 87 ML/MIN/{1.73_M2}
GLUCOSE SERPL-MCNC: 142 MG/DL (ref 70–99)
HCT VFR BLD AUTO: 37.8 % (ref 40.5–52.5)
HGB BLD-MCNC: 12.3 G/DL (ref 13.5–17.5)
LYMPHOCYTES # BLD: 1.1 K/UL (ref 1–5.1)
LYMPHOCYTES NFR BLD: 30.4 %
MCH RBC QN AUTO: 27.8 PG (ref 26–34)
MCHC RBC AUTO-ENTMCNC: 32.6 G/DL (ref 31–36)
MCV RBC AUTO: 85.1 FL (ref 80–100)
MONOCYTES # BLD: 0.4 K/UL (ref 0–1.3)
MONOCYTES NFR BLD: 10.8 %
NEUTROPHILS # BLD: 1.8 K/UL (ref 1.7–7.7)
NEUTROPHILS NFR BLD: 52.9 %
PLATELET # BLD AUTO: 188 K/UL (ref 135–450)
PMV BLD AUTO: 7 FL (ref 5–10.5)
POTASSIUM SERPL-SCNC: 4.1 MMOL/L (ref 3.5–5.1)
PROT SERPL-MCNC: 7.1 G/DL (ref 6.4–8.2)
RBC # BLD AUTO: 4.44 M/UL (ref 4.2–5.9)
SODIUM SERPL-SCNC: 141 MMOL/L (ref 136–145)
WBC # BLD AUTO: 3.5 K/UL (ref 4–11)

## 2024-06-12 NOTE — TELEPHONE ENCOUNTER
Called pt --  Pt reports less pain and drainage.  Still with pain 2 out of 10  PICC ok - working     Will extend iv Zosyn -  2 more weeks, same labs, same PICC maintenance

## 2024-06-13 ENCOUNTER — HOSPITAL ENCOUNTER (OUTPATIENT)
Dept: WOUND CARE | Age: 59
Discharge: HOME | End: 2024-06-13
Attending: SPECIALIST
Payer: COMMERCIAL

## 2024-06-13 VITALS
HEART RATE: 92 BPM | DIASTOLIC BLOOD PRESSURE: 70 MMHG | SYSTOLIC BLOOD PRESSURE: 138 MMHG | TEMPERATURE: 97.3 F | RESPIRATION RATE: 18 BRPM

## 2024-06-13 DIAGNOSIS — I89.0 CHRONIC ACQUIRED LYMPHEDEMA: Primary | ICD-10-CM

## 2024-06-13 DIAGNOSIS — I83.009 VENOUS ULCER (HCC): ICD-10-CM

## 2024-06-13 DIAGNOSIS — I87.331 IDIOPATHIC CHRONIC VENOUS HYPERTENSION OF RIGHT LOWER EXTREMITY WITH ULCER AND INFLAMMATION (HCC): ICD-10-CM

## 2024-06-13 DIAGNOSIS — L97.909 VENOUS ULCER (HCC): ICD-10-CM

## 2024-06-13 DIAGNOSIS — Z91.199 NONCOMPLIANCE: ICD-10-CM

## 2024-06-13 DIAGNOSIS — I89.0 LYMPHEDEMA: ICD-10-CM

## 2024-06-13 PROCEDURE — 11042 DBRDMT SUBQ TIS 1ST 20SQCM/<: CPT | Performed by: SPECIALIST

## 2024-06-13 PROCEDURE — 6370000000 HC RX 637 (ALT 250 FOR IP): Performed by: SPECIALIST

## 2024-06-13 PROCEDURE — 29581 APPL MULTLAYER CMPRN SYS LEG: CPT

## 2024-06-13 PROCEDURE — 11045 DBRDMT SUBQ TISS EACH ADDL: CPT | Performed by: SPECIALIST

## 2024-06-13 PROCEDURE — 11042 DBRDMT SUBQ TIS 1ST 20SQCM/<: CPT

## 2024-06-13 PROCEDURE — 11045 DBRDMT SUBQ TISS EACH ADDL: CPT

## 2024-06-13 RX ORDER — TRIAMCINOLONE ACETONIDE 1 MG/G
OINTMENT TOPICAL ONCE
OUTPATIENT
Start: 2024-06-13 | End: 2024-06-13

## 2024-06-13 RX ORDER — LIDOCAINE 50 MG/G
OINTMENT TOPICAL ONCE
OUTPATIENT
Start: 2024-06-13 | End: 2024-06-13

## 2024-06-13 RX ORDER — CLOBETASOL PROPIONATE 0.5 MG/G
OINTMENT TOPICAL ONCE
OUTPATIENT
Start: 2024-06-13 | End: 2024-06-13

## 2024-06-13 RX ORDER — SODIUM CHLOR/HYPOCHLOROUS ACID 0.033 %
SOLUTION, IRRIGATION IRRIGATION ONCE
Status: DISCONTINUED | OUTPATIENT
Start: 2024-06-13 | End: 2024-06-14 | Stop reason: HOSPADM

## 2024-06-13 RX ORDER — SODIUM CHLOR/HYPOCHLOROUS ACID 0.033 %
SOLUTION, IRRIGATION IRRIGATION ONCE
OUTPATIENT
Start: 2024-06-13 | End: 2024-06-13

## 2024-06-13 RX ORDER — LIDOCAINE HYDROCHLORIDE 40 MG/ML
SOLUTION TOPICAL ONCE
Status: COMPLETED | OUTPATIENT
Start: 2024-06-13 | End: 2024-06-13

## 2024-06-13 RX ORDER — LIDOCAINE 40 MG/G
CREAM TOPICAL ONCE
OUTPATIENT
Start: 2024-06-13 | End: 2024-06-13

## 2024-06-13 RX ORDER — BETAMETHASONE DIPROPIONATE 0.05 %
OINTMENT (GRAM) TOPICAL ONCE
OUTPATIENT
Start: 2024-06-13 | End: 2024-06-13

## 2024-06-13 RX ORDER — IBUPROFEN 200 MG
TABLET ORAL ONCE
OUTPATIENT
Start: 2024-06-13 | End: 2024-06-13

## 2024-06-13 RX ORDER — GINSENG 100 MG
CAPSULE ORAL ONCE
OUTPATIENT
Start: 2024-06-13 | End: 2024-06-13

## 2024-06-13 RX ORDER — GENTAMICIN SULFATE 1 MG/G
OINTMENT TOPICAL ONCE
OUTPATIENT
Start: 2024-06-13 | End: 2024-06-13

## 2024-06-13 RX ORDER — BACITRACIN ZINC AND POLYMYXIN B SULFATE 500; 1000 [USP'U]/G; [USP'U]/G
OINTMENT TOPICAL ONCE
OUTPATIENT
Start: 2024-06-13 | End: 2024-06-13

## 2024-06-13 RX ORDER — LIDOCAINE HYDROCHLORIDE 20 MG/ML
JELLY TOPICAL ONCE
OUTPATIENT
Start: 2024-06-13 | End: 2024-06-13

## 2024-06-13 RX ORDER — LIDOCAINE HYDROCHLORIDE 40 MG/ML
SOLUTION TOPICAL ONCE
OUTPATIENT
Start: 2024-06-13 | End: 2024-06-13

## 2024-06-13 RX ADMIN — LIDOCAINE HYDROCHLORIDE: 40 SOLUTION TOPICAL at 08:13

## 2024-06-13 ASSESSMENT — PAIN DESCRIPTION - FREQUENCY: FREQUENCY: CONTINUOUS

## 2024-06-13 ASSESSMENT — PAIN DESCRIPTION - PAIN TYPE: TYPE: CHRONIC PAIN

## 2024-06-13 ASSESSMENT — PAIN DESCRIPTION - LOCATION: LOCATION: LEG

## 2024-06-13 ASSESSMENT — PAIN DESCRIPTION - ORIENTATION: ORIENTATION: RIGHT

## 2024-06-13 ASSESSMENT — PAIN DESCRIPTION - ONSET: ONSET: ON-GOING

## 2024-06-13 ASSESSMENT — PAIN SCALES - GENERAL: PAINLEVEL_OUTOF10: 2

## 2024-06-13 ASSESSMENT — PAIN DESCRIPTION - DESCRIPTORS: DESCRIPTORS: BURNING

## 2024-06-13 NOTE — TELEPHONE ENCOUNTER
Late entry  Spoke with Carmen, pharmacist with [facility] on 6/12 late afternoon  Gave verbal orders to extend IV ATB through 6/26/24  Carmen verbalized understanding

## 2024-06-13 NOTE — PROGRESS NOTES
Multilayer Compression Wrap   (Not Unna) Below the Knee    NAME:  Justin Baeza  YOB: 1965  MEDICAL RECORD NUMBER:  [id number]  DATE:  6/13/2024       Removed old Multilayer wrap if present and washed leg with mild soap/water.   Applied moisturizing agent to dry skin as needed.    Applied primary and secondary dressing as ordered    Applied multilayered dressing below the knee to Right lower leg(s)  (4 Layer Compression Wrap ) .    Instructed patient/caregiver not to remove dressing and to keep it clean and dry.    Instructed patient/caregiver on complications to report to provider, such as pain, numbness in toes, heavy drainage, and slippage of dressing.    Instructed patient on purpose of compression dressing and on activity and exercise recommendations.   Applied per   Guidelines    Electronically signed by Mera Coelho RN on 6/13/2024 at 9:04 AM        
    Debridement:Excisional Debridement    Using curette the wound was sharply debrided    down through and including the removal of  epidermis, dermis, and subcutaneous tissue.    Devitalized Tissue Debrided:  fibrin, biofilm, and slough      Pre Debridement Measurements:  Are located in the Wound Documentation Flow Sheet   Wound #: 1, 2, 3, and 4     Post  Debridement Measurements:  Wound 07/25/22 Leg Right;Lower;Medial;Proximal #1 (Active)   Wound Image   06/06/24 0819   Wound Etiology Venous 07/25/22 1040   Dressing Status New dressing applied 02/08/24 0843   Wound Cleansed Vashe 06/13/24 0854   Dressing/Treatment Polymer Dressing 06/13/24 0902   Wound Length (cm) 3 cm 06/13/24 0819   Wound Width (cm) 4 cm 06/13/24 0819   Wound Depth (cm) 0.1 cm 06/13/24 0819   Wound Surface Area (cm^2) 12 cm^2 06/13/24 0819   Change in Wound Size % (l*w) 49.26 06/13/24 0819   Wound Volume (cm^3) 1.2 cm^3 06/13/24 0819   Wound Healing % 49 06/13/24 0819   Post-Procedure Length (cm) 3.1 cm 06/13/24 0854   Post-Procedure Width (cm) 4.1 cm 06/13/24 0854   Post-Procedure Depth (cm) 0.3 cm 06/13/24 0854   Post-Procedure Surface Area (cm^2) 12.71 cm^2 06/13/24 0854   Post-Procedure Volume (cm^3) 3.813 cm^3 06/13/24 0854   Distance Tunneling (cm) 0 cm 06/06/24 0819   Tunneling Position ___ O'Clock 0 06/06/24 0819   Undermining Starts ___ O'Clock 0 06/06/24 0819   Undermining Ends___ O'Clock 0 06/06/24 0819   Undermining Maxium Distance (cm) 0 06/06/24 0819   Wound Assessment Fibrin;Pink/red 06/13/24 0819   Drainage Amount Moderate (25-50%) 06/13/24 0819   Drainage Description Brown;Serosanguinous 06/13/24 0819   Odor Mild 06/13/24 0819   Kiah-wound Assessment Hemosiderin staining (brown yellow);Intact;Maceration;Erosion 06/13/24 0819   Margins Defined edges 06/13/24 0819   Wound Thickness Description not for Pressure Injury Full thickness 06/13/24 0819   Number of days: 689       Wound 07/25/22 Leg Right;Anterior;Lower #2 (Active)

## 2024-06-13 NOTE — PATIENT INSTRUCTIONS
Wound Care Center Physician Orders and Discharge Instructions  [facility]   [address]  Telephone: [phone] FAX [phone]  NAME:  Justin Baeza  YOB: 1965  MEDICAL RECORD NUMBER:  [id number]  DATE: 6/13/2024      Return Appointment:  Return Appointment: With Khang Giles MD  in  1 Week(s)  [x] Return Appointment for a Wound Assessment with the nurse on:  06/17/24    No future appointments.          Orders Placed This Encounter   Procedures    Initiate Outpatient Wound Care Protocol       Home Care Company: none    Medically necessary services for evaluation and treatment: []Skilled Nursing (using clean technique) []PT (Eval & Treat) []OT (Eval & Treat) []Social Work []Dietician []Other:      Wound care instructions:  If you smoke we ask that you refrain from smoking. Smoking inhibits wounds from healing.  When taking antibiotics take the entire prescription as ordered. Do not stop taking until medication is all gone unless otherwise instructed.   Exercise as tolerated.   Keep weight off wounds and reposition every 2 hours if applicable.  Do not get wounds wet in the bath or shower unless otherwise instructed by your physician. If your wound is on your foot or leg, you may purchase a cast bag. Please ask at the pharmacy.  Wash hands with soap and water prior to and after every dressing change.    [x]Wash wounds with: No need to wash. Leave dressing in place.    [x]Kiah wound Topical Treatments: Do not apply lotions, creams, or ointments to the skin around the wound bed unless directed as followed:   Apply around the wound: zinc paste- done by dr. Giles in clinic          [x]Wound Location: right lower leg wounds  Apply Primary Dressing to wound: polymem ag  Secondary Dressing: Extra absorbant pad   Avoid contact of tape with skin if possible.  When to change Dressing: DO NOT CHANGE      [x] Multilayer Compression Wrap:  Type:

## 2024-06-13 NOTE — TELEPHONE ENCOUNTER
SHERRILL Katz, PharmD, [facility]  [facility]  [place]: [phone]  [facility]: [phone]  6/13/2024 5:11 PM

## 2024-06-14 ENCOUNTER — TELEPHONE (OUTPATIENT)
Dept: INFECTIOUS DISEASES | Age: 59
End: 2024-06-14

## 2024-06-14 DIAGNOSIS — L97.919 LEG ULCER, RIGHT, WITH UNSPECIFIED SEVERITY (HCC): Primary | ICD-10-CM

## 2024-06-14 NOTE — TELEPHONE ENCOUNTER
Received vm from [facility] stating the do not have a nurse to go out to pt next week for lab collection    Left vm for pt asking if he was aware [facility] could not see him next week and he will need to come to a [facility] facility to have lab drawn  My name, title, Dr Agustin's name,   Pt 1st name   My direct number [phone]    6/17  Spoke with pt and he stated he did get message to go to [facility] lab this week.    OPAT Nurse Coordinator Weekly Update Note    Current OPAT plan:  Zosyn 13.5 gm iv daily through 6/26/24      Diagnosis:  R leg cellulitis       Assessment:  Spoke with pt.  He states leg is much better, nearly all drainage stopped, pain is much better      Follow up appts: [facility] today  Dr. Giles on 6/20

## 2024-06-17 ENCOUNTER — HOSPITAL ENCOUNTER (OUTPATIENT)
Dept: WOUND CARE | Age: 59
Discharge: HOME | End: 2024-06-17
Attending: SPECIALIST
Payer: COMMERCIAL

## 2024-06-17 VITALS
TEMPERATURE: 97.1 F | SYSTOLIC BLOOD PRESSURE: 136 MMHG | RESPIRATION RATE: 18 BRPM | HEART RATE: 80 BPM | DIASTOLIC BLOOD PRESSURE: 55 MMHG

## 2024-06-17 DIAGNOSIS — L03.115 CELLULITIS OF RIGHT LEG: ICD-10-CM

## 2024-06-17 LAB
ALBUMIN SERPL-MCNC: 4.2 G/DL (ref 3.4–5)
ALBUMIN/GLOB SERPL: 1.6 {RATIO} (ref 1.1–2.2)
ALP SERPL-CCNC: 74 U/L (ref 40–129)
ALT SERPL-CCNC: 24 U/L (ref 10–40)
ANION GAP SERPL CALCULATED.3IONS-SCNC: 9 MMOL/L (ref 3–16)
AST SERPL-CCNC: 19 U/L (ref 15–37)
BASOPHILS # BLD: 0 K/UL (ref 0–0.2)
BASOPHILS NFR BLD: 0.4 %
BILIRUB SERPL-MCNC: 0.7 MG/DL (ref 0–1)
BUN SERPL-MCNC: 23 MG/DL (ref 7–20)
CALCIUM SERPL-MCNC: 8.5 MG/DL (ref 8.3–10.6)
CHLORIDE SERPL-SCNC: 107 MMOL/L (ref 99–110)
CO2 SERPL-SCNC: 24 MMOL/L (ref 21–32)
CREAT SERPL-MCNC: 1.2 MG/DL (ref 0.9–1.3)
CRP SERPL-MCNC: 11 MG/L (ref 0–5.1)
DEPRECATED RDW RBC AUTO: 17 % (ref 12.4–15.4)
EOSINOPHIL # BLD: 0.3 K/UL (ref 0–0.6)
EOSINOPHIL NFR BLD: 7.8 %
ERYTHROCYTE [SEDIMENTATION RATE] IN BLOOD BY WESTERGREN METHOD: 16 MM/HR (ref 0–20)
GFR SERPLBLD CREATININE-BSD FMLA CKD-EPI: 70 ML/MIN/{1.73_M2}
GLUCOSE SERPL-MCNC: 93 MG/DL (ref 70–99)
HCT VFR BLD AUTO: 37.7 % (ref 40.5–52.5)
HGB BLD-MCNC: 12.4 G/DL (ref 13.5–17.5)
LYMPHOCYTES # BLD: 1.2 K/UL (ref 1–5.1)
LYMPHOCYTES NFR BLD: 28.4 %
MCH RBC QN AUTO: 27.7 PG (ref 26–34)
MCHC RBC AUTO-ENTMCNC: 32.8 G/DL (ref 31–36)
MCV RBC AUTO: 84.4 FL (ref 80–100)
MONOCYTES # BLD: 0.6 K/UL (ref 0–1.3)
MONOCYTES NFR BLD: 13.4 %
NEUTROPHILS # BLD: 2.2 K/UL (ref 1.7–7.7)
NEUTROPHILS NFR BLD: 50 %
PLATELET # BLD AUTO: 197 K/UL (ref 135–450)
PMV BLD AUTO: 7 FL (ref 5–10.5)
POTASSIUM SERPL-SCNC: 4.1 MMOL/L (ref 3.5–5.1)
PROT SERPL-MCNC: 6.8 G/DL (ref 6.4–8.2)
RBC # BLD AUTO: 4.47 M/UL (ref 4.2–5.9)
SODIUM SERPL-SCNC: 140 MMOL/L (ref 136–145)
WBC # BLD AUTO: 4.4 K/UL (ref 4–11)

## 2024-06-17 PROCEDURE — 29581 APPL MULTLAYER CMPRN SYS LEG: CPT

## 2024-06-17 ASSESSMENT — PAIN DESCRIPTION - LOCATION: LOCATION: LEG

## 2024-06-17 ASSESSMENT — PAIN DESCRIPTION - PAIN TYPE: TYPE: CHRONIC PAIN

## 2024-06-17 ASSESSMENT — PAIN SCALES - GENERAL: PAINLEVEL_OUTOF10: 2

## 2024-06-17 ASSESSMENT — PAIN DESCRIPTION - ORIENTATION: ORIENTATION: RIGHT

## 2024-06-17 ASSESSMENT — PAIN DESCRIPTION - DESCRIPTORS: DESCRIPTORS: BURNING

## 2024-06-17 NOTE — PROGRESS NOTES
Multilayer Compression Wrap   (Not Unna) Below the Knee    NAME:  Justin Baeza  YOB: 1965  MEDICAL RECORD NUMBER:  [id number]  DATE:  6/17/2024       Removed old Multilayer wrap if present and washed leg with mild soap/water.   Applied moisturizing agent to dry skin as needed.    Applied primary and secondary dressing as ordered    Applied multilayered dressing below the knee to Right lower leg(s)  (4 Layer Compression Wrap ) .    Instructed patient/caregiver not to remove dressing and to keep it clean and dry.    Instructed patient/caregiver on complications to report to provider, such as pain, numbness in toes, heavy drainage, and slippage of dressing.    Instructed patient on purpose of compression dressing and on activity and exercise recommendations.   Applied per   Guidelines    Electronically signed by Mera Coeloh RN on 6/17/2024 at 8:17 AM

## 2024-06-19 NOTE — TELEPHONE ENCOUNTER
Sent NS letter #2.     Yana Katz, PharmD, [facility]  [facility]  [place]: [phone]  [facility]: [phone]  6/19/2024 10:00 AM

## 2024-06-20 ENCOUNTER — HOSPITAL ENCOUNTER (OUTPATIENT)
Dept: WOUND CARE | Age: 59
Discharge: HOME | End: 2024-06-20
Attending: SPECIALIST
Payer: COMMERCIAL

## 2024-06-20 VITALS
SYSTOLIC BLOOD PRESSURE: 127 MMHG | DIASTOLIC BLOOD PRESSURE: 67 MMHG | RESPIRATION RATE: 16 BRPM | TEMPERATURE: 97 F | HEART RATE: 67 BPM

## 2024-06-20 DIAGNOSIS — I87.331 IDIOPATHIC CHRONIC VENOUS HYPERTENSION OF RIGHT LOWER EXTREMITY WITH ULCER AND INFLAMMATION (HCC): ICD-10-CM

## 2024-06-20 DIAGNOSIS — I89.0 CHRONIC ACQUIRED LYMPHEDEMA: Primary | ICD-10-CM

## 2024-06-20 DIAGNOSIS — I83.009 VENOUS ULCER (HCC): ICD-10-CM

## 2024-06-20 DIAGNOSIS — Z91.199 NONCOMPLIANCE: ICD-10-CM

## 2024-06-20 DIAGNOSIS — L97.909 VENOUS ULCER (HCC): ICD-10-CM

## 2024-06-20 DIAGNOSIS — I89.0 LYMPHEDEMA: ICD-10-CM

## 2024-06-20 PROCEDURE — 11042 DBRDMT SUBQ TIS 1ST 20SQCM/<: CPT

## 2024-06-20 PROCEDURE — 11045 DBRDMT SUBQ TISS EACH ADDL: CPT

## 2024-06-20 PROCEDURE — 29581 APPL MULTLAYER CMPRN SYS LEG: CPT

## 2024-06-20 PROCEDURE — 6370000000 HC RX 637 (ALT 250 FOR IP): Performed by: SPECIALIST

## 2024-06-20 RX ORDER — GENTAMICIN SULFATE 1 MG/G
OINTMENT TOPICAL ONCE
OUTPATIENT
Start: 2024-06-20 | End: 2024-06-20

## 2024-06-20 RX ORDER — LIDOCAINE HYDROCHLORIDE 20 MG/ML
JELLY TOPICAL ONCE
OUTPATIENT
Start: 2024-06-20 | End: 2024-06-20

## 2024-06-20 RX ORDER — LIDOCAINE 50 MG/G
OINTMENT TOPICAL ONCE
OUTPATIENT
Start: 2024-06-20 | End: 2024-06-20

## 2024-06-20 RX ORDER — LIDOCAINE 40 MG/G
CREAM TOPICAL ONCE
OUTPATIENT
Start: 2024-06-20 | End: 2024-06-20

## 2024-06-20 RX ORDER — SODIUM CHLOR/HYPOCHLOROUS ACID 0.033 %
SOLUTION, IRRIGATION IRRIGATION ONCE
Status: COMPLETED | OUTPATIENT
Start: 2024-06-20 | End: 2024-06-20

## 2024-06-20 RX ORDER — LIDOCAINE HYDROCHLORIDE 40 MG/ML
SOLUTION TOPICAL ONCE
OUTPATIENT
Start: 2024-06-20 | End: 2024-06-20

## 2024-06-20 RX ORDER — GINSENG 100 MG
CAPSULE ORAL ONCE
OUTPATIENT
Start: 2024-06-20 | End: 2024-06-20

## 2024-06-20 RX ORDER — SODIUM CHLOR/HYPOCHLOROUS ACID 0.033 %
SOLUTION, IRRIGATION IRRIGATION ONCE
OUTPATIENT
Start: 2024-06-20 | End: 2024-06-20

## 2024-06-20 RX ORDER — BETAMETHASONE DIPROPIONATE 0.05 %
OINTMENT (GRAM) TOPICAL ONCE
OUTPATIENT
Start: 2024-06-20 | End: 2024-06-20

## 2024-06-20 RX ORDER — IBUPROFEN 200 MG
TABLET ORAL ONCE
OUTPATIENT
Start: 2024-06-20 | End: 2024-06-20

## 2024-06-20 RX ORDER — BACITRACIN ZINC AND POLYMYXIN B SULFATE 500; 1000 [USP'U]/G; [USP'U]/G
OINTMENT TOPICAL ONCE
OUTPATIENT
Start: 2024-06-20 | End: 2024-06-20

## 2024-06-20 RX ORDER — LIDOCAINE HYDROCHLORIDE 40 MG/ML
SOLUTION TOPICAL ONCE
Status: COMPLETED | OUTPATIENT
Start: 2024-06-20 | End: 2024-06-20

## 2024-06-20 RX ORDER — TRIAMCINOLONE ACETONIDE 1 MG/G
OINTMENT TOPICAL ONCE
OUTPATIENT
Start: 2024-06-20 | End: 2024-06-20

## 2024-06-20 RX ORDER — CLOBETASOL PROPIONATE 0.5 MG/G
OINTMENT TOPICAL ONCE
OUTPATIENT
Start: 2024-06-20 | End: 2024-06-20

## 2024-06-20 RX ADMIN — Medication: at 08:38

## 2024-06-20 RX ADMIN — LIDOCAINE HYDROCHLORIDE: 40 SOLUTION TOPICAL at 08:20

## 2024-06-20 ASSESSMENT — PAIN DESCRIPTION - ORIENTATION: ORIENTATION: RIGHT

## 2024-06-20 ASSESSMENT — PAIN DESCRIPTION - LOCATION: LOCATION: LEG

## 2024-06-20 ASSESSMENT — PAIN DESCRIPTION - DESCRIPTORS: DESCRIPTORS: BURNING

## 2024-06-20 ASSESSMENT — PAIN DESCRIPTION - PAIN TYPE: TYPE: CHRONIC PAIN

## 2024-06-20 ASSESSMENT — PAIN SCALES - GENERAL: PAINLEVEL_OUTOF10: 2

## 2024-06-20 ASSESSMENT — PAIN DESCRIPTION - FREQUENCY: FREQUENCY: CONTINUOUS

## 2024-06-20 NOTE — PROGRESS NOTES
Multilayer Compression Wrap   (Not Unna) Below the Knee    NAME:  Justin Baeza  YOB: 1965  MEDICAL RECORD NUMBER:  [id number]  DATE:  6/20/2024       Removed old Multilayer wrap if present and washed leg with mild soap/water.   Applied moisturizing agent to dry skin as needed.    Applied primary and secondary dressing as ordered    Applied multilayered dressing below the knee to Right lower leg(s)  (4 Layer Compression Wrap ) .    Instructed patient/caregiver not to remove dressing and to keep it clean and dry.    Instructed patient/caregiver on complications to report to provider, such as pain, numbness in toes, heavy drainage, and slippage of dressing.    Instructed patient on purpose of compression dressing and on activity and exercise recommendations.   Applied per   Guidelines    Electronically signed by Edgar Nunez RN on 6/20/2024 at 9:03 AM

## 2024-06-20 NOTE — PROGRESS NOTES
[facility]  Progress Note and Procedure Note      Justin Baeza  MEDICAL RECORD NUMBER:  [id number]  AGE: 59 y.o.   GENDER: male  : 1965  EPISODE DATE:  2024    Subjective:     Chief Complaint   Patient presents with    Wound Check     Right lower leg           HISTORY of PRESENT ILLNESS HPI     Justin Baeza is a 59 y.o. male who presents today for wound/ulcer evaluation.   History of Wound Context: Patient continues follow-up for chronic venous insufficiency with ulceration and lymphedema. He was recently placed back on IV antibiotics per Dr. Agustin from infectious disease. Patient describes less pain and drainage this past week   Wound/Ulcer Pain Timing/Severity: none  Quality of pain: N/A  Severity:  0 / 10   Modifying Factors: None  Associated Signs/Symptoms: edema and drainage    Ulcer Identification:  Ulcer Type: venous    Contributing Factors: edema, venous stasis, lymphedema, obesity, and anticoagulation therapy    Acute Wound: N/A not an acute wound    PAST MEDICAL HISTORY        Diagnosis Date    DVT (deep venous thrombosis) (HCC)     Hx of blood clots     Pain and swelling of right lower leg        PAST SURGICAL HISTORY    Past Surgical History:   Procedure Laterality Date    BALLOON ANGIOPLASTY, ARTERY Right 2017    at [facility]    DILATATION, ESOPHAGUS      SKIN SPLIT GRAFT Right        FAMILY HISTORY    Family History   Problem Relation Age of Onset    Diabetes Mother     Heart Attack Father        SOCIAL HISTORY    Social History     Tobacco Use    Smoking status: Never    Smokeless tobacco: Never   Vaping Use    Vaping Use: Never used   Substance Use Topics    Alcohol use: No    Drug use: No       ALLERGIES    No Known Allergies    MEDICATIONS    Current Outpatient Medications on File Prior to Encounter   Medication Sig Dispense Refill    piperacillin-tazobactam (ZOSYN) infusion Infuse 13.5 g intravenously continuous for 9 days Compound per protocol. 9

## 2024-06-20 NOTE — PATIENT INSTRUCTIONS
Applied on Right lower leg(s)  4 Layer Compression Wrap    Do not get leg(s) with wrap wet.  If wraps become too tight call the center or completely remove the wrap.   Elevate leg(s) above the level of the heart when sitting.  Avoid prolonged standing in one place.   Applied in Clinic on 6/20/2024  The Goal of this therapy is to reduce edema and get into long term compression garments to control venous insufficiency, lymphedema and reduce occurrence of venous ulcers    [x] Edema Control: 30-40mmHG  Apply: Compression Stocking on the Left leg  Apply every morning immediately when getting up. Remove every night before going to bed unless instructed otherwise     Elevate leg(s) above the level of the heart for 30 minutes 4-5 times a day and/or when sitting.  Avoid prolonged standing in one place.    [x] Lymphedema Therapy:  Wear Lymphedema pumps twice a day at settings prescribed by your physician.   Avoid prolonged standing in one place.      Dietary:  Important dietary reminders:  1. Increase Protein intake (i.e. Lean meats, fish, eggs, legumes, and yogurt)  2. No added salt  3. If diabetic, follow a diabetic diet and check glucose prior to meals or as instructed by your physician.    Dietary Supplements(Take twice a day unless instructed otherwise):  [] Papito  [] 30ml ProStat [] Ensure Complete [] Ensure Max/Premier [] Expedite [] Other:    Your nurse  is:  talisha     Electronically signed by Talisha Hawkins RN on 6/20/2024 at 8:39 AM     Wound Care Center Information: Should you experience any significant changes in your wound(s) or have questions about your wound care, please contact the [facility] at [phone].   Hours of operation:  Mon:  8AM - 2PM  Tue: 11AM - 5PM  Wed: CLOSED  Thur: 8AM - 4:30PM  Fri:  8AM - 4:30PM  The office is closed on all major holidays.    Please give us 24-48 business hours to return your call.  These hours of operation are subject to change. If you

## 2024-06-24 ENCOUNTER — HOSPITAL ENCOUNTER (OUTPATIENT)
Dept: WOUND CARE | Age: 59
Discharge: HOME | End: 2024-06-24
Attending: SPECIALIST
Payer: COMMERCIAL

## 2024-06-24 VITALS
DIASTOLIC BLOOD PRESSURE: 75 MMHG | HEART RATE: 80 BPM | SYSTOLIC BLOOD PRESSURE: 131 MMHG | TEMPERATURE: 96.8 F | RESPIRATION RATE: 16 BRPM

## 2024-06-24 DIAGNOSIS — I89.0 CHRONIC ACQUIRED LYMPHEDEMA: Primary | ICD-10-CM

## 2024-06-24 DIAGNOSIS — I82.5Z1 CHRONIC VENOUS EMBOLISM AND THROMBOSIS OF DEEP VESSELS OF DISTAL END OF RIGHT LOWER EXTREMITY (HCC): Primary | ICD-10-CM

## 2024-06-24 DIAGNOSIS — L97.909 VENOUS ULCER (HCC): ICD-10-CM

## 2024-06-24 DIAGNOSIS — I87.331 IDIOPATHIC CHRONIC VENOUS HYPERTENSION OF RIGHT LOWER EXTREMITY WITH ULCER AND INFLAMMATION (HCC): ICD-10-CM

## 2024-06-24 DIAGNOSIS — Z91.199 NONCOMPLIANCE: ICD-10-CM

## 2024-06-24 DIAGNOSIS — I89.0 LYMPHEDEMA: ICD-10-CM

## 2024-06-24 DIAGNOSIS — I83.009 VENOUS ULCER (HCC): ICD-10-CM

## 2024-06-24 PROCEDURE — 29581 APPL MULTLAYER CMPRN SYS LEG: CPT

## 2024-06-24 RX ORDER — GINSENG 100 MG
CAPSULE ORAL ONCE
OUTPATIENT
Start: 2024-06-24 | End: 2024-06-24

## 2024-06-24 RX ORDER — TRIAMCINOLONE ACETONIDE 1 MG/G
OINTMENT TOPICAL ONCE
OUTPATIENT
Start: 2024-06-24 | End: 2024-06-24

## 2024-06-24 RX ORDER — LIDOCAINE 40 MG/G
CREAM TOPICAL ONCE
OUTPATIENT
Start: 2024-06-24 | End: 2024-06-24

## 2024-06-24 RX ORDER — LIDOCAINE HYDROCHLORIDE 40 MG/ML
SOLUTION TOPICAL ONCE
Status: CANCELLED | OUTPATIENT
Start: 2024-06-24 | End: 2024-06-24

## 2024-06-24 RX ORDER — BACITRACIN ZINC AND POLYMYXIN B SULFATE 500; 1000 [USP'U]/G; [USP'U]/G
OINTMENT TOPICAL ONCE
OUTPATIENT
Start: 2024-06-24 | End: 2024-06-24

## 2024-06-24 RX ORDER — LIDOCAINE HYDROCHLORIDE 40 MG/ML
SOLUTION TOPICAL ONCE
Status: DISCONTINUED | OUTPATIENT
Start: 2024-06-24 | End: 2024-06-25 | Stop reason: HOSPADM

## 2024-06-24 RX ORDER — IBUPROFEN 200 MG
TABLET ORAL ONCE
OUTPATIENT
Start: 2024-06-24 | End: 2024-06-24

## 2024-06-24 RX ORDER — LIDOCAINE HYDROCHLORIDE 20 MG/ML
JELLY TOPICAL ONCE
OUTPATIENT
Start: 2024-06-24 | End: 2024-06-24

## 2024-06-24 RX ORDER — BETAMETHASONE DIPROPIONATE 0.05 %
OINTMENT (GRAM) TOPICAL ONCE
OUTPATIENT
Start: 2024-06-24 | End: 2024-06-24

## 2024-06-24 RX ORDER — LIDOCAINE 50 MG/G
OINTMENT TOPICAL ONCE
OUTPATIENT
Start: 2024-06-24 | End: 2024-06-24

## 2024-06-24 RX ORDER — CLOBETASOL PROPIONATE 0.5 MG/G
OINTMENT TOPICAL ONCE
OUTPATIENT
Start: 2024-06-24 | End: 2024-06-24

## 2024-06-24 RX ORDER — GENTAMICIN SULFATE 1 MG/G
OINTMENT TOPICAL ONCE
OUTPATIENT
Start: 2024-06-24 | End: 2024-06-24

## 2024-06-24 RX ORDER — SODIUM CHLOR/HYPOCHLOROUS ACID 0.033 %
SOLUTION, IRRIGATION IRRIGATION ONCE
Status: CANCELLED | OUTPATIENT
Start: 2024-06-24 | End: 2024-06-24

## 2024-06-24 ASSESSMENT — PAIN DESCRIPTION - PAIN TYPE: TYPE: CHRONIC PAIN

## 2024-06-24 ASSESSMENT — PAIN DESCRIPTION - LOCATION: LOCATION: LEG

## 2024-06-24 ASSESSMENT — PAIN DESCRIPTION - DESCRIPTORS: DESCRIPTORS: ACHING

## 2024-06-24 ASSESSMENT — PAIN DESCRIPTION - ORIENTATION: ORIENTATION: RIGHT

## 2024-06-24 ASSESSMENT — PAIN SCALES - GENERAL: PAINLEVEL_OUTOF10: 2

## 2024-06-24 NOTE — PATIENT INSTRUCTIONS
Wound Care Center Physician Orders and Discharge Instructions  [facility]  [address] [facility]. [address] [place] [address]  Telephone: [phone]      FAX [phone]    NAME:  Justin Baeza  YOB: 1965  MEDICAL RECORD NUMBER:  [id number]  DATE:  6/24/2024      Wound care:  Continue to follow the instructions and recommendations from your last doctor visit.  The dressing(s) applied is the same as your last visit. Please refer to your last discharge instruction for the information on your wound care.      If there were any changes made, please follow the instructions as written here: none    Future Appointments     Future Appointments   Date Time Provider Department Center   6/27/2024  8:00 AM Khang Giles MD [facility] W[facility]           Your nurse  is:  Yesenia     Electronically signed by Kathy Gomez RN on 6/24/2024 at 8:39 AM     Wound Care Center Information: Should you experience any significant changes in your wound(s) or have questions about your wound care, please contact the [facility] at [phone]. Our hours vary so please leave a message. Please give us 24-48 hours to return your call.   If you need help with your wounds and cannot wait until we are available, contact your PCP or go to the hospital emergency room.       Physician orders by:  Dr. Giles        The information contained in the After Visit Summary has been reviewed with me, the patient and/or responsible adult, by my health care provider(s).  I had the opportunity to ask questions regarding this information.  I have elected to receive;      [] Patient unable to sign Discharge Instructions. Given to ECF/Transportation/POA

## 2024-06-24 NOTE — PROGRESS NOTES
Multilayer Compression Wrap   (Not Unna) Below the Knee    NAME:  Justin Baeza  YOB: 1965  MEDICAL RECORD NUMBER:  [id number]  DATE:  6/24/2024       Removed old Multilayer wrap if present and washed leg with mild soap/water.   Applied moisturizing agent to dry skin as needed.    Applied primary and secondary dressing as ordered    Applied multilayered dressing below the knee to Right lower leg(s)  (4 Layer Compression Wrap ) .    Instructed patient/caregiver not to remove dressing and to keep it clean and dry.    Instructed patient/caregiver on complications to report to provider, such as pain, numbness in toes, heavy drainage, and slippage of dressing.    Instructed patient on purpose of compression dressing and on activity and exercise recommendations.   Applied per   Guidelines    Electronically signed by Kathy Gomez RN on 6/24/2024 at 8:37 AM

## 2024-06-25 ENCOUNTER — TELEPHONE (OUTPATIENT)
Dept: INFECTIOUS DISEASES | Age: 59
End: 2024-06-25

## 2024-06-25 LAB
ALBUMIN SERPL-MCNC: 4.1 G/DL (ref 3.4–5)
ALBUMIN/GLOB SERPL: 1.3 {RATIO} (ref 1.1–2.2)
ALP SERPL-CCNC: 75 U/L (ref 40–129)
ALT SERPL-CCNC: 18 U/L (ref 10–40)
ANION GAP SERPL CALCULATED.3IONS-SCNC: 10 MMOL/L (ref 3–16)
AST SERPL-CCNC: 20 U/L (ref 15–37)
BASOPHILS # BLD: 0 K/UL (ref 0–0.2)
BASOPHILS NFR BLD: 0.2 %
BILIRUB SERPL-MCNC: 0.6 MG/DL (ref 0–1)
BUN SERPL-MCNC: 17 MG/DL (ref 7–20)
CALCIUM SERPL-MCNC: 8.9 MG/DL (ref 8.3–10.6)
CHLORIDE SERPL-SCNC: 108 MMOL/L (ref 99–110)
CO2 SERPL-SCNC: 23 MMOL/L (ref 21–32)
CREAT SERPL-MCNC: 1.1 MG/DL (ref 0.9–1.3)
CRP SERPL-MCNC: 17.9 MG/L (ref 0–5.1)
DEPRECATED RDW RBC AUTO: 16.6 % (ref 12.4–15.4)
EOSINOPHIL # BLD: 0.4 K/UL (ref 0–0.6)
EOSINOPHIL NFR BLD: 10.5 %
ERYTHROCYTE [SEDIMENTATION RATE] IN BLOOD BY WESTERGREN METHOD: 15 MM/HR (ref 0–20)
GFR SERPLBLD CREATININE-BSD FMLA CKD-EPI: 77 ML/MIN/{1.73_M2}
GLUCOSE SERPL-MCNC: 78 MG/DL (ref 70–99)
HCT VFR BLD AUTO: 36.3 % (ref 40.5–52.5)
HGB BLD-MCNC: 12.3 G/DL (ref 13.5–17.5)
LYMPHOCYTES # BLD: 1.2 K/UL (ref 1–5.1)
LYMPHOCYTES NFR BLD: 31.2 %
MCH RBC QN AUTO: 28.4 PG (ref 26–34)
MCHC RBC AUTO-ENTMCNC: 33.9 G/DL (ref 31–36)
MCV RBC AUTO: 83.6 FL (ref 80–100)
MONOCYTES # BLD: 0.5 K/UL (ref 0–1.3)
MONOCYTES NFR BLD: 13.4 %
NEUTROPHILS # BLD: 1.7 K/UL (ref 1.7–7.7)
NEUTROPHILS NFR BLD: 44.7 %
PLATELET # BLD AUTO: 177 K/UL (ref 135–450)
PMV BLD AUTO: 7.2 FL (ref 5–10.5)
POTASSIUM SERPL-SCNC: 4.2 MMOL/L (ref 3.5–5.1)
PROT SERPL-MCNC: 7.2 G/DL (ref 6.4–8.2)
RBC # BLD AUTO: 4.35 M/UL (ref 4.2–5.9)
SODIUM SERPL-SCNC: 141 MMOL/L (ref 136–145)
WBC # BLD AUTO: 3.7 K/UL (ref 4–11)

## 2024-06-27 ENCOUNTER — HOSPITAL ENCOUNTER (OUTPATIENT)
Dept: WOUND CARE | Age: 59
Discharge: HOME | End: 2024-06-27
Attending: SPECIALIST
Payer: COMMERCIAL

## 2024-06-27 VITALS
SYSTOLIC BLOOD PRESSURE: 136 MMHG | RESPIRATION RATE: 16 BRPM | TEMPERATURE: 97.4 F | HEART RATE: 72 BPM | DIASTOLIC BLOOD PRESSURE: 74 MMHG

## 2024-06-27 DIAGNOSIS — I87.331 IDIOPATHIC CHRONIC VENOUS HYPERTENSION OF RIGHT LOWER EXTREMITY WITH ULCER AND INFLAMMATION (HCC): ICD-10-CM

## 2024-06-27 DIAGNOSIS — I89.0 CHRONIC ACQUIRED LYMPHEDEMA: Primary | ICD-10-CM

## 2024-06-27 DIAGNOSIS — I89.0 LYMPHEDEMA: ICD-10-CM

## 2024-06-27 DIAGNOSIS — I83.009 VENOUS ULCER (HCC): ICD-10-CM

## 2024-06-27 DIAGNOSIS — L97.909 VENOUS ULCER (HCC): ICD-10-CM

## 2024-06-27 DIAGNOSIS — Z91.199 NONCOMPLIANCE: ICD-10-CM

## 2024-06-27 PROCEDURE — 11042 DBRDMT SUBQ TIS 1ST 20SQCM/<: CPT

## 2024-06-27 PROCEDURE — 6370000000 HC RX 637 (ALT 250 FOR IP): Performed by: SPECIALIST

## 2024-06-27 PROCEDURE — 11045 DBRDMT SUBQ TISS EACH ADDL: CPT

## 2024-06-27 PROCEDURE — 29581 APPL MULTLAYER CMPRN SYS LEG: CPT

## 2024-06-27 PROCEDURE — 11045 DBRDMT SUBQ TISS EACH ADDL: CPT | Performed by: SPECIALIST

## 2024-06-27 PROCEDURE — 11042 DBRDMT SUBQ TIS 1ST 20SQCM/<: CPT | Performed by: SPECIALIST

## 2024-06-27 RX ORDER — BACITRACIN ZINC AND POLYMYXIN B SULFATE 500; 1000 [USP'U]/G; [USP'U]/G
OINTMENT TOPICAL ONCE
OUTPATIENT
Start: 2024-06-27 | End: 2024-06-27

## 2024-06-27 RX ORDER — CLOBETASOL PROPIONATE 0.5 MG/G
OINTMENT TOPICAL ONCE
OUTPATIENT
Start: 2024-06-27 | End: 2024-06-27

## 2024-06-27 RX ORDER — LIDOCAINE 50 MG/G
OINTMENT TOPICAL ONCE
OUTPATIENT
Start: 2024-06-27 | End: 2024-06-27

## 2024-06-27 RX ORDER — LIDOCAINE HYDROCHLORIDE 20 MG/ML
JELLY TOPICAL ONCE
OUTPATIENT
Start: 2024-06-27 | End: 2024-06-27

## 2024-06-27 RX ORDER — LIDOCAINE HYDROCHLORIDE 40 MG/ML
SOLUTION TOPICAL ONCE
OUTPATIENT
Start: 2024-06-27 | End: 2024-06-27

## 2024-06-27 RX ORDER — LIDOCAINE HYDROCHLORIDE 40 MG/ML
SOLUTION TOPICAL ONCE
Status: DISCONTINUED | OUTPATIENT
Start: 2024-06-27 | End: 2024-06-28 | Stop reason: HOSPADM

## 2024-06-27 RX ORDER — TRIAMCINOLONE ACETONIDE 1 MG/G
OINTMENT TOPICAL ONCE
OUTPATIENT
Start: 2024-06-27 | End: 2024-06-27

## 2024-06-27 RX ORDER — GENTAMICIN SULFATE 1 MG/G
OINTMENT TOPICAL ONCE
OUTPATIENT
Start: 2024-06-27 | End: 2024-06-27

## 2024-06-27 RX ORDER — BETAMETHASONE DIPROPIONATE 0.05 %
OINTMENT (GRAM) TOPICAL ONCE
OUTPATIENT
Start: 2024-06-27 | End: 2024-06-27

## 2024-06-27 RX ORDER — GINSENG 100 MG
CAPSULE ORAL ONCE
OUTPATIENT
Start: 2024-06-27 | End: 2024-06-27

## 2024-06-27 RX ORDER — SODIUM CHLOR/HYPOCHLOROUS ACID 0.033 %
SOLUTION, IRRIGATION IRRIGATION ONCE
Status: COMPLETED | OUTPATIENT
Start: 2024-06-27 | End: 2024-06-27

## 2024-06-27 RX ORDER — SODIUM CHLOR/HYPOCHLOROUS ACID 0.033 %
SOLUTION, IRRIGATION IRRIGATION ONCE
OUTPATIENT
Start: 2024-06-27 | End: 2024-06-27

## 2024-06-27 RX ORDER — IBUPROFEN 200 MG
TABLET ORAL ONCE
OUTPATIENT
Start: 2024-06-27 | End: 2024-06-27

## 2024-06-27 RX ORDER — LIDOCAINE 40 MG/G
CREAM TOPICAL ONCE
OUTPATIENT
Start: 2024-06-27 | End: 2024-06-27

## 2024-06-27 RX ADMIN — Medication: at 08:44

## 2024-06-27 ASSESSMENT — PAIN DESCRIPTION - PAIN TYPE: TYPE: CHRONIC PAIN

## 2024-06-27 ASSESSMENT — PAIN DESCRIPTION - FREQUENCY: FREQUENCY: CONTINUOUS

## 2024-06-27 ASSESSMENT — PAIN DESCRIPTION - LOCATION: LOCATION: LEG

## 2024-06-27 ASSESSMENT — PAIN DESCRIPTION - ORIENTATION: ORIENTATION: RIGHT

## 2024-06-27 ASSESSMENT — PAIN DESCRIPTION - DESCRIPTORS: DESCRIPTORS: BURNING;ACHING

## 2024-06-27 NOTE — PROGRESS NOTES
Multilayer Compression Wrap   (Not Unna) Below the Knee    NAME:  Justin Baeza  YOB: 1965  MEDICAL RECORD NUMBER:  [id number]  DATE:  6/27/2024       Removed old Multilayer wrap if present and washed leg with mild soap/water.   Applied moisturizing agent to dry skin as needed.    Applied primary and secondary dressing as ordered    Applied multilayered dressing below the knee to Right lower leg(s)  (4 Layer Compression Wrap ) .    Instructed patient/caregiver not to remove dressing and to keep it clean and dry.    Instructed patient/caregiver on complications to report to provider, such as pain, numbness in toes, heavy drainage, and slippage of dressing.    Instructed patient on purpose of compression dressing and on activity and exercise recommendations.   Applied per   Guidelines    Electronically signed by Kathy Gomez RN on 6/27/2024 at 8:46 AM      
MD    Consent obtained? Yes    Time out taken: Yes    Pain Control: Anesthetic: 4% Lidocaine Liquid Topical     Debridement:Excisional Debridement    Using curette the wound was sharply debrided    down through and including the removal of  epidermis, dermis, and subcutaneous tissue.    Devitalized Tissue Debrided:  fibrin and biofilm      Pre Debridement Measurements:  Are located in the Wound Documentation Flow Sheet   Wound #: 1, 2, 3, and 4     Post  Debridement Measurements:  Wound 07/25/22 Leg Right;Lower;Medial;Proximal #1 (Active)   Wound Image   06/06/24 0819   Wound Etiology Venous 07/25/22 1040   Dressing Status New dressing applied 02/08/24 0843   Wound Cleansed Vashe 06/27/24 0839   Dressing/Treatment Hydrofera blue 06/27/24 0845   Wound Length (cm) 4 cm 06/27/24 0809   Wound Width (cm) 4.2 cm 06/27/24 0809   Wound Depth (cm) 0.1 cm 06/27/24 0809   Wound Surface Area (cm^2) 16.8 cm^2 06/27/24 0809   Change in Wound Size % (l*w) 28.96 06/27/24 0809   Wound Volume (cm^3) 1.68 cm^3 06/27/24 0809   Wound Healing % 29 06/27/24 0809   Post-Procedure Length (cm) 4.1 cm 06/27/24 0839   Post-Procedure Width (cm) 4.3 cm 06/27/24 0839   Post-Procedure Depth (cm) 0.3 cm 06/27/24 0839   Post-Procedure Surface Area (cm^2) 17.63 cm^2 06/27/24 0839   Post-Procedure Volume (cm^3) 5.289 cm^3 06/27/24 0839   Distance Tunneling (cm) 0 cm 06/27/24 0809   Tunneling Position ___ O'Clock 0 06/06/24 0819   Undermining Starts ___ O'Clock 0 06/06/24 0819   Undermining Ends___ O'Clock 0 06/06/24 0819   Undermining Maxium Distance (cm) 0 06/27/24 0809   Wound Assessment Fibrin;Pink/red;Granulation tissue 06/27/24 0809   Drainage Amount Small (< 25%) 06/27/24 0809   Drainage Description Brown;Serosanguinous 06/27/24 0809   Odor None 06/27/24 0809   Kiah-wound Assessment Hemosiderin staining (brown yellow);Intact;Maceration;Erosion 06/27/24 0809   Margins Defined edges 06/27/24 0809   Wound Thickness Description not for Pressure

## 2024-06-27 NOTE — PATIENT INSTRUCTIONS
Wound Care Center Physician Orders and Discharge Instructions  [facility]   [address]  Telephone: [phone] FAX [phone]  NAME:  Justin Baeza  YOB: 1965  MEDICAL RECORD NUMBER:  [id number]  DATE: 6/27/2024      Return Appointment:  Return Appointment: With Khang Giles MD  in  1 Week(s)  [x] Return Appointment for a Wound Assessment with the nurse on:     No future appointments.          Orders Placed This Encounter   Procedures    Initiate Outpatient Wound Care Protocol       Home Care Company: none    Medically necessary services for evaluation and treatment: []Skilled Nursing (using clean technique) []PT (Eval & Treat) []OT (Eval & Treat) []Social Work []Dietician []Other:      Wound care instructions:  If you smoke we ask that you refrain from smoking. Smoking inhibits wounds from healing.  When taking antibiotics take the entire prescription as ordered. Do not stop taking until medication is all gone unless otherwise instructed.   Exercise as tolerated.   Keep weight off wounds and reposition every 2 hours if applicable.  Do not get wounds wet in the bath or shower unless otherwise instructed by your physician. If your wound is on your foot or leg, you may purchase a cast bag. Please ask at the pharmacy.  Wash hands with soap and water prior to and after every dressing change.    [x]Wash wounds with: No need to wash. Leave dressing in place.    [x]Kiah wound Topical Treatments: Do not apply lotions, creams, or ointments to the skin around the wound bed unless directed as followed:   Apply around the wound: zinc paste- done by dr. Giles in clinic          [x]Wound Location: right lower leg wounds  Apply Primary Dressing to wound: endoform, hydrofera blue ready  Secondary Dressing: Extra absorbant pad   Avoid contact of tape with skin if possible.  When to change Dressing: DO NOT CHANGE      [x] Multilayer Compression

## 2024-07-01 ENCOUNTER — TELEPHONE (OUTPATIENT)
Dept: INFECTIOUS DISEASES | Age: 59
End: 2024-07-01

## 2024-07-01 NOTE — TELEPHONE ENCOUNTER
OPAT Nurse Coordinator Weekly Update Note     Current OPAT plan:  Zosyn 13.5 gm iv daily through 6/26/24        Diagnosis:  R leg cellulitis         Assessment:  Spoke with pt.  He states the drainage is very little.  Pt is to term IV ATB this week.  When asked about being done with IV ATB this week, Pt stated, \"I don't know\"  Pt states, \"the wound is almost closed\"  Pt does see Dr Giles on Wednesday this week, and is set to end IV ATB same day  Pt has been extended a total of 3 weeks from original term date     Follow up appts:  Pt sees Dr Giles on 7/3   Pt term date is 7/3

## 2024-07-02 LAB
ALBUMIN SERPL-MCNC: 4.2 G/DL (ref 3.4–5)
ALBUMIN/GLOB SERPL: 1.4 {RATIO} (ref 1.1–2.2)
ALP SERPL-CCNC: 73 U/L (ref 40–129)
ALT SERPL-CCNC: 21 U/L (ref 10–40)
ANION GAP SERPL CALCULATED.3IONS-SCNC: 12 MMOL/L (ref 3–16)
AST SERPL-CCNC: 22 U/L (ref 15–37)
BASOPHILS # BLD: 0 K/UL (ref 0–0.2)
BASOPHILS NFR BLD: 0.2 %
BILIRUB SERPL-MCNC: 0.7 MG/DL (ref 0–1)
BUN SERPL-MCNC: 18 MG/DL (ref 7–20)
CALCIUM SERPL-MCNC: 9 MG/DL (ref 8.3–10.6)
CHLORIDE SERPL-SCNC: 107 MMOL/L (ref 99–110)
CO2 SERPL-SCNC: 22 MMOL/L (ref 21–32)
CREAT SERPL-MCNC: 1 MG/DL (ref 0.9–1.3)
CRP SERPL-MCNC: 21.4 MG/L (ref 0–5.1)
DEPRECATED RDW RBC AUTO: 17.1 % (ref 12.4–15.4)
EOSINOPHIL # BLD: 0.5 K/UL (ref 0–0.6)
EOSINOPHIL NFR BLD: 11.2 %
ERYTHROCYTE [SEDIMENTATION RATE] IN BLOOD BY WESTERGREN METHOD: 17 MM/HR (ref 0–20)
GFR SERPLBLD CREATININE-BSD FMLA CKD-EPI: 87 ML/MIN/{1.73_M2}
GLUCOSE SERPL-MCNC: 78 MG/DL (ref 70–99)
HCT VFR BLD AUTO: 37.5 % (ref 40.5–52.5)
HGB BLD-MCNC: 12.3 G/DL (ref 13.5–17.5)
LYMPHOCYTES # BLD: 1.3 K/UL (ref 1–5.1)
LYMPHOCYTES NFR BLD: 29.4 %
MCH RBC QN AUTO: 27.6 PG (ref 26–34)
MCHC RBC AUTO-ENTMCNC: 32.9 G/DL (ref 31–36)
MCV RBC AUTO: 84 FL (ref 80–100)
MONOCYTES # BLD: 0.7 K/UL (ref 0–1.3)
MONOCYTES NFR BLD: 15 %
NEUTROPHILS # BLD: 1.9 K/UL (ref 1.7–7.7)
NEUTROPHILS NFR BLD: 44.2 %
PLATELET # BLD AUTO: 194 K/UL (ref 135–450)
PMV BLD AUTO: 7 FL (ref 5–10.5)
POTASSIUM SERPL-SCNC: 4.2 MMOL/L (ref 3.5–5.1)
PROT SERPL-MCNC: 7.2 G/DL (ref 6.4–8.2)
RBC # BLD AUTO: 4.46 M/UL (ref 4.2–5.9)
SODIUM SERPL-SCNC: 141 MMOL/L (ref 136–145)
WBC # BLD AUTO: 4.4 K/UL (ref 4–11)

## 2024-07-02 NOTE — TELEPHONE ENCOUNTER
Per Dr Agustin - pt can end OPAT as planned after 7/3 dose and have Midline removed    OPAT End  Call made to pharmacy, spoke with Margareth at [facility] and gave verbal orders to end IV ATB and remove Midline after tomorrow's dose     Call made to [facility] - left message for [facility] nurse, Lex Woodson, to remove PICC after tomorrow's dose  My name, title, Dr Agustin's name, specialty and affiliation  Pt name and +  My direct number [phone]    Call made to patient and notified him we will be ending IV ATB's after tomorrow's dose and [facility] will remove Midline after tomorrow's dose    Will f/u in 1-2 days to verify line removal

## 2024-07-03 ENCOUNTER — ANTI-COAG VISIT (OUTPATIENT)
Dept: PHARMACY | Age: 59
End: 2024-07-03
Payer: COMMERCIAL

## 2024-07-03 ENCOUNTER — HOSPITAL ENCOUNTER (OUTPATIENT)
Dept: WOUND CARE | Age: 59
Discharge: HOME | End: 2024-07-03
Attending: SPECIALIST
Payer: COMMERCIAL

## 2024-07-03 VITALS
TEMPERATURE: 97.7 F | HEART RATE: 69 BPM | SYSTOLIC BLOOD PRESSURE: 116 MMHG | RESPIRATION RATE: 18 BRPM | DIASTOLIC BLOOD PRESSURE: 72 MMHG

## 2024-07-03 DIAGNOSIS — I89.0 CHRONIC ACQUIRED LYMPHEDEMA: Primary | ICD-10-CM

## 2024-07-03 DIAGNOSIS — L97.909 VENOUS ULCER (HCC): ICD-10-CM

## 2024-07-03 DIAGNOSIS — Z91.199 NONCOMPLIANCE: ICD-10-CM

## 2024-07-03 DIAGNOSIS — I87.331 IDIOPATHIC CHRONIC VENOUS HYPERTENSION OF RIGHT LOWER EXTREMITY WITH ULCER AND INFLAMMATION (HCC): ICD-10-CM

## 2024-07-03 DIAGNOSIS — I83.009 VENOUS ULCER (HCC): ICD-10-CM

## 2024-07-03 DIAGNOSIS — I82.5Z1 CHRONIC VENOUS EMBOLISM AND THROMBOSIS OF DEEP VESSELS OF DISTAL END OF RIGHT LOWER EXTREMITY (HCC): Primary | ICD-10-CM

## 2024-07-03 DIAGNOSIS — I89.0 LYMPHEDEMA: ICD-10-CM

## 2024-07-03 LAB
INTERNATIONAL NORMALIZATION RATIO, POC: 2.4
PROTHROMBIN TIME, POC: NORMAL

## 2024-07-03 PROCEDURE — 11042 DBRDMT SUBQ TIS 1ST 20SQCM/<: CPT

## 2024-07-03 PROCEDURE — 11045 DBRDMT SUBQ TISS EACH ADDL: CPT

## 2024-07-03 PROCEDURE — 85610 PROTHROMBIN TIME: CPT | Performed by: PHARMACIST

## 2024-07-03 PROCEDURE — 99211 OFF/OP EST MAY X REQ PHY/QHP: CPT | Performed by: PHARMACIST

## 2024-07-03 PROCEDURE — 29581 APPL MULTLAYER CMPRN SYS LEG: CPT

## 2024-07-03 RX ORDER — SODIUM CHLOR/HYPOCHLOROUS ACID 0.033 %
SOLUTION, IRRIGATION IRRIGATION ONCE
OUTPATIENT
Start: 2024-07-03 | End: 2024-07-03

## 2024-07-03 RX ORDER — LIDOCAINE 40 MG/G
CREAM TOPICAL ONCE
OUTPATIENT
Start: 2024-07-03 | End: 2024-07-03

## 2024-07-03 RX ORDER — BETAMETHASONE DIPROPIONATE 0.05 %
OINTMENT (GRAM) TOPICAL ONCE
OUTPATIENT
Start: 2024-07-03 | End: 2024-07-03

## 2024-07-03 RX ORDER — CLOBETASOL PROPIONATE 0.5 MG/G
OINTMENT TOPICAL ONCE
OUTPATIENT
Start: 2024-07-03 | End: 2024-07-03

## 2024-07-03 RX ORDER — LIDOCAINE HYDROCHLORIDE 40 MG/ML
SOLUTION TOPICAL ONCE
Status: DISCONTINUED | OUTPATIENT
Start: 2024-07-03 | End: 2024-07-04 | Stop reason: HOSPADM

## 2024-07-03 RX ORDER — LIDOCAINE 50 MG/G
OINTMENT TOPICAL ONCE
OUTPATIENT
Start: 2024-07-03 | End: 2024-07-03

## 2024-07-03 RX ORDER — LIDOCAINE HYDROCHLORIDE 20 MG/ML
JELLY TOPICAL ONCE
OUTPATIENT
Start: 2024-07-03 | End: 2024-07-03

## 2024-07-03 RX ORDER — GENTAMICIN SULFATE 1 MG/G
OINTMENT TOPICAL ONCE
OUTPATIENT
Start: 2024-07-03 | End: 2024-07-03

## 2024-07-03 RX ORDER — IBUPROFEN 200 MG
TABLET ORAL ONCE
OUTPATIENT
Start: 2024-07-03 | End: 2024-07-03

## 2024-07-03 RX ORDER — BACITRACIN ZINC AND POLYMYXIN B SULFATE 500; 1000 [USP'U]/G; [USP'U]/G
OINTMENT TOPICAL ONCE
OUTPATIENT
Start: 2024-07-03 | End: 2024-07-03

## 2024-07-03 RX ORDER — TRIAMCINOLONE ACETONIDE 1 MG/G
OINTMENT TOPICAL ONCE
OUTPATIENT
Start: 2024-07-03 | End: 2024-07-03

## 2024-07-03 RX ORDER — GINSENG 100 MG
CAPSULE ORAL ONCE
OUTPATIENT
Start: 2024-07-03 | End: 2024-07-03

## 2024-07-03 RX ORDER — LIDOCAINE HYDROCHLORIDE 40 MG/ML
SOLUTION TOPICAL ONCE
OUTPATIENT
Start: 2024-07-03 | End: 2024-07-03

## 2024-07-03 ASSESSMENT — PAIN DESCRIPTION - PAIN TYPE: TYPE: CHRONIC PAIN

## 2024-07-03 ASSESSMENT — PAIN DESCRIPTION - LOCATION: LOCATION: LEG

## 2024-07-03 ASSESSMENT — PAIN DESCRIPTION - DESCRIPTORS: DESCRIPTORS: ACHING;BURNING;CRAMPING

## 2024-07-03 ASSESSMENT — PAIN SCALES - GENERAL: PAINLEVEL_OUTOF10: 7

## 2024-07-03 ASSESSMENT — PAIN DESCRIPTION - ORIENTATION: ORIENTATION: RIGHT;LOWER

## 2024-07-03 ASSESSMENT — PAIN - FUNCTIONAL ASSESSMENT: PAIN_FUNCTIONAL_ASSESSMENT: ACTIVITIES ARE NOT PREVENTED

## 2024-07-03 NOTE — PROGRESS NOTES
Management Clinic  [place]: [phone]  [place]: [phone]  7/3/2024 1:17 PM    For Pharmacy Admin Tracking Only  Time Spent (min): 15

## 2024-07-03 NOTE — PATIENT INSTRUCTIONS
Wound Care Center Physician Orders and Discharge Instructions  [facility]   [address]  Telephone: [phone] FAX [phone]  NAME:  Justin Baeza  YOB: 1965  MEDICAL RECORD NUMBER:  [id number]  DATE: 7/3/2024      Return Appointment:  Return Appointment: With Khang Giles MD  in  1 Week(s)  [x] Return Appointment for a Wound Assessment with the nurse on:     No future appointments.          Orders Placed This Encounter   Procedures    Initiate Outpatient Wound Care Protocol       Home Care Company: none    Medically necessary services for evaluation and treatment: []Skilled Nursing (using clean technique) []PT (Eval & Treat) []OT (Eval & Treat) []Social Work []Dietician []Other:      Wound care instructions:  If you smoke we ask that you refrain from smoking. Smoking inhibits wounds from healing.  When taking antibiotics take the entire prescription as ordered. Do not stop taking until medication is all gone unless otherwise instructed.   Exercise as tolerated.   Keep weight off wounds and reposition every 2 hours if applicable.  Do not get wounds wet in the bath or shower unless otherwise instructed by your physician. If your wound is on your foot or leg, you may purchase a cast bag. Please ask at the pharmacy.  Wash hands with soap and water prior to and after every dressing change.    [x]Wash wounds with: No need to wash. Leave dressing in place.    [x]Kiah wound Topical Treatments: Do not apply lotions, creams, or ointments to the skin around the wound bed unless directed as followed:   Apply around the wound: zinc paste- done by dr. Giles in clinic          [x]Wound Location: right lower leg wounds  Apply Primary Dressing to wound: cutimed sorbact  Secondary Dressing: Extra absorbant pad   Avoid contact of tape with skin if possible.  When to change Dressing: DO NOT CHANGE      [x] Multilayer Compression Wrap:  Type: Applied on

## 2024-07-05 NOTE — PROGRESS NOTES
Multilayer Compression Wrap   (Not Unna) Below the Knee    NAME:  Justin Baeza  YOB: 1965  MEDICAL RECORD NUMBER:  [id number]  DATE:  7/3/2024       Removed old Multilayer wrap if present and washed leg with mild soap/water.   Applied moisturizing agent to dry skin as needed.    Applied primary and secondary dressing as ordered    Applied multilayered dressing below the knee to Right lower leg(s)  (4 Layer Compression Wrap ) .    Instructed patient/caregiver not to remove dressing and to keep it clean and dry.    Instructed patient/caregiver on complications to report to provider, such as pain, numbness in toes, heavy drainage, and slippage of dressing.    Instructed patient on purpose of compression dressing and on activity and exercise recommendations.   Applied per   Guidelines    Electronically signed by Kathy Gomez RN on 7/3/2024 at 11:55 AM      
07/03/24 1108   Number of days: 710       Wound 07/25/22 Leg Right;Anterior;Lower #2 (Active)   Wound Image   07/03/24 1108   Wound Etiology Venous 07/25/22 1040   Dressing Status New dressing applied 02/08/24 0843   Wound Cleansed Cleansed with saline 07/03/24 1108   Dressing/Treatment Cutimed/Sorbact 07/03/24 1154   Wound Length (cm) 1.3 cm 07/03/24 1108   Wound Width (cm) 3.5 cm 07/03/24 1108   Wound Depth (cm) 0.1 cm 07/03/24 1108   Wound Surface Area (cm^2) 4.55 cm^2 07/03/24 1108   Change in Wound Size % (l*w) 90.02 07/03/24 1108   Wound Volume (cm^3) 0.455 cm^3 07/03/24 1108   Wound Healing % 90 07/03/24 1108   Post-Procedure Length (cm) 1.4 cm 07/03/24 1151   Post-Procedure Width (cm) 3.6 cm 07/03/24 1151   Post-Procedure Depth (cm) 0.3 cm 07/03/24 1151   Post-Procedure Surface Area (cm^2) 5.04 cm^2 07/03/24 1151   Post-Procedure Volume (cm^3) 1.512 cm^3 07/03/24 1151   Distance Tunneling (cm) 0 cm 06/27/24 0809   Tunneling Position ___ O'Clock 0 06/06/24 0819   Undermining Starts ___ O'Clock 0 06/06/24 0819   Undermining Ends___ O'Clock 0 06/06/24 0819   Undermining Maxium Distance (cm) 0 06/27/24 0809   Wound Assessment Fibrin;Pink/red 07/03/24 1108   Drainage Amount Moderate (25-50%) 07/03/24 1108   Drainage Description Brown;Serosanguinous 07/03/24 1108   Odor Mild 07/03/24 1108   Kiah-wound Assessment Intact;Maceration 07/03/24 1108   Margins Defined edges 07/03/24 1108   Wound Thickness Description not for Pressure Injury Full thickness 07/03/24 1108   Number of days: 710       Wound 07/25/22 Leg Right;Posterior;Lower #3 (Active)   Wound Image   07/03/24 1108   Wound Etiology Venous 07/25/22 1040   Dressing Status New dressing applied 02/08/24 0843   Wound Cleansed Cleansed with saline 07/03/24 1108   Dressing/Treatment Cutimed/Sorbact 07/03/24 1154   Wound Length (cm) 3.2 cm 07/03/24 1108   Wound Width (cm) 7.2 cm 07/03/24 1108   Wound Depth (cm) 0.1 cm 07/03/24 1108   Wound Surface Area (cm^2) 23.04

## 2024-07-11 ENCOUNTER — HOSPITAL ENCOUNTER (OUTPATIENT)
Dept: WOUND CARE | Age: 59
Discharge: HOME | End: 2024-07-11
Attending: SPECIALIST
Payer: COMMERCIAL

## 2024-07-11 VITALS
TEMPERATURE: 97.5 F | SYSTOLIC BLOOD PRESSURE: 120 MMHG | RESPIRATION RATE: 18 BRPM | HEART RATE: 76 BPM | DIASTOLIC BLOOD PRESSURE: 65 MMHG

## 2024-07-11 DIAGNOSIS — L97.909 VENOUS ULCER (HCC): ICD-10-CM

## 2024-07-11 DIAGNOSIS — I83.009 VENOUS ULCER (HCC): ICD-10-CM

## 2024-07-11 DIAGNOSIS — Z91.199 NONCOMPLIANCE: ICD-10-CM

## 2024-07-11 DIAGNOSIS — I87.331 IDIOPATHIC CHRONIC VENOUS HYPERTENSION OF RIGHT LOWER EXTREMITY WITH ULCER AND INFLAMMATION (HCC): ICD-10-CM

## 2024-07-11 DIAGNOSIS — I89.0 LYMPHEDEMA: ICD-10-CM

## 2024-07-11 DIAGNOSIS — I89.0 CHRONIC ACQUIRED LYMPHEDEMA: Primary | ICD-10-CM

## 2024-07-11 PROCEDURE — 6370000000 HC RX 637 (ALT 250 FOR IP): Performed by: SPECIALIST

## 2024-07-11 PROCEDURE — 11045 DBRDMT SUBQ TISS EACH ADDL: CPT

## 2024-07-11 PROCEDURE — 29581 APPL MULTLAYER CMPRN SYS LEG: CPT

## 2024-07-11 PROCEDURE — 11042 DBRDMT SUBQ TIS 1ST 20SQCM/<: CPT

## 2024-07-11 RX ORDER — LIDOCAINE HYDROCHLORIDE 20 MG/ML
JELLY TOPICAL ONCE
OUTPATIENT
Start: 2024-07-11 | End: 2024-07-11

## 2024-07-11 RX ORDER — TRIAMCINOLONE ACETONIDE 1 MG/G
OINTMENT TOPICAL ONCE
OUTPATIENT
Start: 2024-07-11 | End: 2024-07-11

## 2024-07-11 RX ORDER — SODIUM CHLOR/HYPOCHLOROUS ACID 0.033 %
SOLUTION, IRRIGATION IRRIGATION ONCE
OUTPATIENT
Start: 2024-07-11 | End: 2024-07-11

## 2024-07-11 RX ORDER — IBUPROFEN 200 MG
TABLET ORAL ONCE
OUTPATIENT
Start: 2024-07-11 | End: 2024-07-11

## 2024-07-11 RX ORDER — CLOBETASOL PROPIONATE 0.5 MG/G
OINTMENT TOPICAL ONCE
OUTPATIENT
Start: 2024-07-11 | End: 2024-07-11

## 2024-07-11 RX ORDER — SODIUM CHLOR/HYPOCHLOROUS ACID 0.033 %
SOLUTION, IRRIGATION IRRIGATION ONCE
Status: COMPLETED | OUTPATIENT
Start: 2024-07-11 | End: 2024-07-11

## 2024-07-11 RX ORDER — GENTAMICIN SULFATE 1 MG/G
OINTMENT TOPICAL ONCE
OUTPATIENT
Start: 2024-07-11 | End: 2024-07-11

## 2024-07-11 RX ORDER — LIDOCAINE 50 MG/G
OINTMENT TOPICAL ONCE
OUTPATIENT
Start: 2024-07-11 | End: 2024-07-11

## 2024-07-11 RX ORDER — BACITRACIN ZINC AND POLYMYXIN B SULFATE 500; 1000 [USP'U]/G; [USP'U]/G
OINTMENT TOPICAL ONCE
OUTPATIENT
Start: 2024-07-11 | End: 2024-07-11

## 2024-07-11 RX ORDER — GINSENG 100 MG
CAPSULE ORAL ONCE
OUTPATIENT
Start: 2024-07-11 | End: 2024-07-11

## 2024-07-11 RX ORDER — LIDOCAINE 40 MG/G
CREAM TOPICAL ONCE
OUTPATIENT
Start: 2024-07-11 | End: 2024-07-11

## 2024-07-11 RX ORDER — BETAMETHASONE DIPROPIONATE 0.05 %
OINTMENT (GRAM) TOPICAL ONCE
OUTPATIENT
Start: 2024-07-11 | End: 2024-07-11

## 2024-07-11 RX ORDER — LIDOCAINE HYDROCHLORIDE 40 MG/ML
SOLUTION TOPICAL ONCE
OUTPATIENT
Start: 2024-07-11 | End: 2024-07-11

## 2024-07-11 RX ADMIN — Medication: at 09:05

## 2024-07-11 ASSESSMENT — PAIN DESCRIPTION - PAIN TYPE: TYPE: CHRONIC PAIN

## 2024-07-11 ASSESSMENT — PAIN DESCRIPTION - ONSET: ONSET: ON-GOING

## 2024-07-11 ASSESSMENT — PAIN SCALES - GENERAL: PAINLEVEL_OUTOF10: 7

## 2024-07-11 ASSESSMENT — PAIN DESCRIPTION - DESCRIPTORS: DESCRIPTORS: ACHING;BURNING;CRAMPING

## 2024-07-11 ASSESSMENT — PAIN DESCRIPTION - LOCATION: LOCATION: LEG

## 2024-07-11 ASSESSMENT — PAIN - FUNCTIONAL ASSESSMENT: PAIN_FUNCTIONAL_ASSESSMENT: ACTIVITIES ARE NOT PREVENTED

## 2024-07-11 ASSESSMENT — PAIN DESCRIPTION - FREQUENCY: FREQUENCY: CONTINUOUS

## 2024-07-11 ASSESSMENT — PAIN DESCRIPTION - ORIENTATION: ORIENTATION: RIGHT;LOWER

## 2024-07-11 NOTE — PROGRESS NOTES
[facility]  Progress Note and Procedure Note      Justin Baeza  MEDICAL RECORD NUMBER:  [id number]  AGE: 59 y.o.   GENDER: male  : 1965  EPISODE DATE:  2024    Subjective:     Chief Complaint   Patient presents with    Wound Check     Right leg         HISTORY of PRESENT ILLNESS HPI     Justin Baeza is a 59 y.o. male who presents today for wound/ulcer evaluation.   History of Wound Context: Patient continues follow-up for chronic venous insufficiency ulceration and lymphedema right lower extremity.  He is recently off his antibiotics as per Dr. Agustin  Wound/Ulcer Pain Timing/Severity: none  Quality of pain: N/A  Severity:  0 / 10   Modifying Factors: None  Associated Signs/Symptoms: edema, drainage, and odor    Ulcer Identification:  Ulcer Type: venous    Contributing Factors: edema, venous stasis, lymphedema, obesity, and anticoagulation therapy    Acute Wound: N/A not an acute wound    PAST MEDICAL HISTORY        Diagnosis Date    DVT (deep venous thrombosis) (HCC)     Hx of blood clots     Pain and swelling of right lower leg        PAST SURGICAL HISTORY    Past Surgical History:   Procedure Laterality Date    BALLOON ANGIOPLASTY, ARTERY Right 2017    at [facility]    DILATATION, ESOPHAGUS      SKIN SPLIT GRAFT Right        FAMILY HISTORY    Family History   Problem Relation Age of Onset    Diabetes Mother     Heart Attack Father        SOCIAL HISTORY    Social History     Tobacco Use    Smoking status: Never    Smokeless tobacco: Never   Vaping Use    Vaping Use: Never used   Substance Use Topics    Alcohol use: No    Drug use: No       ALLERGIES    No Known Allergies    MEDICATIONS    Current Outpatient Medications on File Prior to Encounter   Medication Sig Dispense Refill    piperacillin-tazobactam (ZOSYN) infusion Infuse 13.5 g intravenously continuous Compound per protocol. (Patient not taking: Reported on 2024)      warfarin (COUMADIN) 5 MG tablet Take

## 2024-07-11 NOTE — PATIENT INSTRUCTIONS
Wound Care Center Physician Orders and Discharge Instructions  [facility]   [address]  Telephone: [phone] FAX [phone]  NAME:  Justin Baeza  YOB: 1965  MEDICAL RECORD NUMBER:  [id number]  DATE: 7/11/2024      Return Appointment:  Return Appointment: With Khang Giles MD  in  1 Week(s)  [x] Return Appointment for a Wound Assessment with the nurse on:     Future Appointments   Date Time Provider Department Center   8/15/2024  9:30 AM [facility] MEDICATION MANAGEMENT [facility]             Orders Placed This Encounter   Procedures    Initiate Outpatient Wound Care Protocol       Home Care Company: none    Medically necessary services for evaluation and treatment: []Skilled Nursing (using clean technique) []PT (Eval & Treat) []OT (Eval & Treat) []Social Work []Dietician []Other:      Wound care instructions:  If you smoke we ask that you refrain from smoking. Smoking inhibits wounds from healing.  When taking antibiotics take the entire prescription as ordered. Do not stop taking until medication is all gone unless otherwise instructed.   Exercise as tolerated.   Keep weight off wounds and reposition every 2 hours if applicable.  Do not get wounds wet in the bath or shower unless otherwise instructed by your physician. If your wound is on your foot or leg, you may purchase a cast bag. Please ask at the pharmacy.  Wash hands with soap and water prior to and after every dressing change.    [x]Wash wounds with: No need to wash. Leave dressing in place.    [x]Kiah wound Topical Treatments: Do not apply lotions, creams, or ointments to the skin around the wound bed unless directed as followed:   Apply around the wound: zinc paste- done by dr. Giles in clinic          [x]Wound Location: right lower leg wounds  Apply Primary Dressing to wound: polymem ag  Secondary Dressing: Extra absorbant pad   Avoid contact of tape with skin if

## 2024-07-11 NOTE — PROGRESS NOTES
Multilayer Compression Wrap   (Not Unna) Below the Knee    NAME:  Justin Baeza  YOB: 1965  MEDICAL RECORD NUMBER:  [id number]  DATE:  7/11/2024       Removed old Multilayer wrap if present and washed leg with mild soap/water.   Applied moisturizing agent to dry skin as needed.    Applied primary and secondary dressing as ordered    Applied multilayered dressing below the knee to Right lower leg(s)  (4 Layer Compression Wrap ) .    Instructed patient/caregiver not to remove dressing and to keep it clean and dry.    Instructed patient/caregiver on complications to report to provider, such as pain, numbness in toes, heavy drainage, and slippage of dressing.    Instructed patient on purpose of compression dressing and on activity and exercise recommendations.   Applied per   Guidelines    Electronically signed by Kathy Gomez RN on 7/11/2024 at 9:31 AM

## 2024-07-12 ENCOUNTER — TELEPHONE (OUTPATIENT)
Dept: PHARMACY | Age: 59
End: 2024-07-12

## 2024-07-12 NOTE — TELEPHONE ENCOUNTER
Patient needs new referral for anticoagulation clinic. His PCP has moved locations. Will send new order once patient is established with new provider.     Yana Katz, PharmD, [facility]  [facility]  [place]: [phone]  [place]: [phone]  7/12/2024 12:48 PM

## 2024-07-18 ENCOUNTER — HOSPITAL ENCOUNTER (OUTPATIENT)
Dept: WOUND CARE | Age: 59
Discharge: HOME | End: 2024-07-18
Attending: SPECIALIST
Payer: COMMERCIAL

## 2024-07-18 VITALS
DIASTOLIC BLOOD PRESSURE: 81 MMHG | TEMPERATURE: 97 F | HEART RATE: 69 BPM | RESPIRATION RATE: 16 BRPM | SYSTOLIC BLOOD PRESSURE: 133 MMHG

## 2024-07-18 DIAGNOSIS — I89.0 CHRONIC ACQUIRED LYMPHEDEMA: Primary | ICD-10-CM

## 2024-07-18 DIAGNOSIS — I83.009 VENOUS ULCER (HCC): ICD-10-CM

## 2024-07-18 DIAGNOSIS — L97.909 VENOUS ULCER (HCC): ICD-10-CM

## 2024-07-18 DIAGNOSIS — I89.0 LYMPHEDEMA: ICD-10-CM

## 2024-07-18 DIAGNOSIS — I87.331 IDIOPATHIC CHRONIC VENOUS HYPERTENSION OF RIGHT LOWER EXTREMITY WITH ULCER AND INFLAMMATION (HCC): ICD-10-CM

## 2024-07-18 DIAGNOSIS — Z91.199 NONCOMPLIANCE: ICD-10-CM

## 2024-07-18 PROCEDURE — 11042 DBRDMT SUBQ TIS 1ST 20SQCM/<: CPT

## 2024-07-18 PROCEDURE — 29581 APPL MULTLAYER CMPRN SYS LEG: CPT

## 2024-07-18 PROCEDURE — 6370000000 HC RX 637 (ALT 250 FOR IP): Performed by: SPECIALIST

## 2024-07-18 PROCEDURE — 11045 DBRDMT SUBQ TISS EACH ADDL: CPT

## 2024-07-18 RX ORDER — LIDOCAINE HYDROCHLORIDE 20 MG/ML
JELLY TOPICAL ONCE
OUTPATIENT
Start: 2024-07-18 | End: 2024-07-18

## 2024-07-18 RX ORDER — LIDOCAINE HYDROCHLORIDE 40 MG/ML
SOLUTION TOPICAL ONCE
Status: COMPLETED | OUTPATIENT
Start: 2024-07-18 | End: 2024-07-18

## 2024-07-18 RX ORDER — TRIAMCINOLONE ACETONIDE 1 MG/G
OINTMENT TOPICAL ONCE
OUTPATIENT
Start: 2024-07-18 | End: 2024-07-18

## 2024-07-18 RX ORDER — GENTAMICIN SULFATE 1 MG/G
OINTMENT TOPICAL ONCE
OUTPATIENT
Start: 2024-07-18 | End: 2024-07-18

## 2024-07-18 RX ORDER — SODIUM CHLOR/HYPOCHLOROUS ACID 0.033 %
SOLUTION, IRRIGATION IRRIGATION ONCE
OUTPATIENT
Start: 2024-07-18 | End: 2024-07-18

## 2024-07-18 RX ORDER — LIDOCAINE HYDROCHLORIDE 40 MG/ML
SOLUTION TOPICAL ONCE
OUTPATIENT
Start: 2024-07-18 | End: 2024-07-18

## 2024-07-18 RX ORDER — BACITRACIN ZINC AND POLYMYXIN B SULFATE 500; 1000 [USP'U]/G; [USP'U]/G
OINTMENT TOPICAL ONCE
OUTPATIENT
Start: 2024-07-18 | End: 2024-07-18

## 2024-07-18 RX ORDER — IBUPROFEN 200 MG
TABLET ORAL ONCE
OUTPATIENT
Start: 2024-07-18 | End: 2024-07-18

## 2024-07-18 RX ORDER — BETAMETHASONE DIPROPIONATE 0.05 %
OINTMENT (GRAM) TOPICAL ONCE
OUTPATIENT
Start: 2024-07-18 | End: 2024-07-18

## 2024-07-18 RX ORDER — LIDOCAINE 40 MG/G
CREAM TOPICAL ONCE
OUTPATIENT
Start: 2024-07-18 | End: 2024-07-18

## 2024-07-18 RX ORDER — GINSENG 100 MG
CAPSULE ORAL ONCE
OUTPATIENT
Start: 2024-07-18 | End: 2024-07-18

## 2024-07-18 RX ORDER — CLOBETASOL PROPIONATE 0.5 MG/G
OINTMENT TOPICAL ONCE
OUTPATIENT
Start: 2024-07-18 | End: 2024-07-18

## 2024-07-18 RX ORDER — LIDOCAINE 50 MG/G
OINTMENT TOPICAL ONCE
OUTPATIENT
Start: 2024-07-18 | End: 2024-07-18

## 2024-07-18 RX ADMIN — LIDOCAINE HYDROCHLORIDE: 40 SOLUTION TOPICAL at 08:27

## 2024-07-18 ASSESSMENT — PAIN DESCRIPTION - PAIN TYPE: TYPE: CHRONIC PAIN

## 2024-07-18 ASSESSMENT — PAIN DESCRIPTION - ORIENTATION: ORIENTATION: RIGHT

## 2024-07-18 ASSESSMENT — PAIN DESCRIPTION - FREQUENCY: FREQUENCY: CONTINUOUS

## 2024-07-18 ASSESSMENT — PAIN SCALES - GENERAL: PAINLEVEL_OUTOF10: 6

## 2024-07-18 ASSESSMENT — PAIN DESCRIPTION - LOCATION: LOCATION: LEG

## 2024-07-18 ASSESSMENT — PAIN DESCRIPTION - DESCRIPTORS: DESCRIPTORS: BURNING

## 2024-07-18 NOTE — PROGRESS NOTES
Multilayer Compression Wrap   (Not Unna) Below the Knee    NAME:  Justin Baeza  YOB: 1965  MEDICAL RECORD NUMBER:  [id number]  DATE:  7/18/2024       Removed old Multilayer wrap if present and washed leg with mild soap/water.   Applied moisturizing agent to dry skin as needed.    Applied primary and secondary dressing as ordered    Applied multilayered dressing below the knee to Right lower leg(s)  (4 Layer Compression Wrap ) .    Instructed patient/caregiver not to remove dressing and to keep it clean and dry.    Instructed patient/caregiver on complications to report to provider, such as pain, numbness in toes, heavy drainage, and slippage of dressing.    Instructed patient on purpose of compression dressing and on activity and exercise recommendations.   Applied per   Guidelines    Electronically signed by Kathy Gomez RN on 7/18/2024 at 8:47 AM

## 2024-07-18 NOTE — PROGRESS NOTES
[facility]  Progress Note and Procedure Note      Justin Baeza  MEDICAL RECORD NUMBER:  [id number]  AGE: 59 y.o.   GENDER: male  : 1965  EPISODE DATE:  2024    Subjective:     Chief Complaint   Patient presents with    Wound Check     Right lower leg           HISTORY of PRESENT ILLNESS HPI     Justin Baeza is a 59 y.o. male who presents today for wound/ulcer evaluation.   History of Wound Context: Patient continues follow-up for chronic venous insufficiency ulceration and lymphedema right lower extremity. He is recently off his antibiotics as per Dr. Agustin   Wound/Ulcer Pain Timing/Severity: none  Quality of pain: N/A  Severity:  0 / 10   Modifying Factors: None  Associated Signs/Symptoms: edema, drainage, and odor    Ulcer Identification:  Ulcer Type: venous    Contributing Factors: edema, venous stasis, lymphedema, obesity, and anticoagulation therapy    Acute Wound: N/A not an acute wound    PAST MEDICAL HISTORY        Diagnosis Date    DVT (deep venous thrombosis) (HCC)     Hx of blood clots     Pain and swelling of right lower leg        PAST SURGICAL HISTORY    Past Surgical History:   Procedure Laterality Date    BALLOON ANGIOPLASTY, ARTERY Right 2017    at [facility]    DILATATION, ESOPHAGUS      SKIN SPLIT GRAFT Right        FAMILY HISTORY    Family History   Problem Relation Age of Onset    Diabetes Mother     Heart Attack Father        SOCIAL HISTORY    Social History     Tobacco Use    Smoking status: Never    Smokeless tobacco: Never   Vaping Use    Vaping Use: Never used   Substance Use Topics    Alcohol use: No    Drug use: No       ALLERGIES    No Known Allergies    MEDICATIONS    Current Outpatient Medications on File Prior to Encounter   Medication Sig Dispense Refill    warfarin (COUMADIN) 5 MG tablet Take 7.5 mg (1.5 tablets) every day except take 5 mg (1 tablet) on  and  or as directed by the anticoagulation clinic (Patient taking

## 2024-07-18 NOTE — PATIENT INSTRUCTIONS
Wound Care Center Physician Orders and Discharge Instructions  [facility]   [address]  Telephone: [phone] FAX [phone]  NAME:  Justin Baeza  YOB: 1965  MEDICAL RECORD NUMBER:  [id number]  DATE: 7/18/2024      Return Appointment:  Return Appointment: With Khang Giles MD  in  1 Week(s)  [x] Return Appointment for a Wound Assessment with the nurse on:     Future Appointments   Date Time Provider Department Center   8/15/2024  9:30 AM [facility] MEDICATION MANAGEMENT [facility]             Orders Placed This Encounter   Procedures    Initiate Outpatient Wound Care Protocol       Home Care Company: none    Medically necessary services for evaluation and treatment: []Skilled Nursing (using clean technique) []PT (Eval & Treat) []OT (Eval & Treat) []Social Work []Dietician []Other:      Wound care instructions:  If you smoke we ask that you refrain from smoking. Smoking inhibits wounds from healing.  When taking antibiotics take the entire prescription as ordered. Do not stop taking until medication is all gone unless otherwise instructed.   Exercise as tolerated.   Keep weight off wounds and reposition every 2 hours if applicable.  Do not get wounds wet in the bath or shower unless otherwise instructed by your physician. If your wound is on your foot or leg, you may purchase a cast bag. Please ask at the pharmacy.  Wash hands with soap and water prior to and after every dressing change.    [x]Wash wounds with: No need to wash. Leave dressing in place.    [x]Kiah wound Topical Treatments: Do not apply lotions, creams, or ointments to the skin around the wound bed unless directed as followed:   Apply around the wound: zinc paste- done by dr. Giles in clinic          [x]Wound Location: right lower leg wounds  Apply Primary Dressing to wound: polymem ag  Secondary Dressing: Extra absorbant pad   Avoid contact of tape with skin if

## 2024-07-25 ENCOUNTER — HOSPITAL ENCOUNTER (OUTPATIENT)
Dept: WOUND CARE | Age: 59
Discharge: HOME | End: 2024-07-25
Attending: SPECIALIST
Payer: COMMERCIAL

## 2024-07-25 VITALS
TEMPERATURE: 97 F | DIASTOLIC BLOOD PRESSURE: 85 MMHG | SYSTOLIC BLOOD PRESSURE: 137 MMHG | RESPIRATION RATE: 16 BRPM | HEART RATE: 76 BPM

## 2024-07-25 DIAGNOSIS — L97.909 VENOUS ULCER (HCC): ICD-10-CM

## 2024-07-25 DIAGNOSIS — I87.331 IDIOPATHIC CHRONIC VENOUS HYPERTENSION OF RIGHT LOWER EXTREMITY WITH ULCER AND INFLAMMATION (HCC): ICD-10-CM

## 2024-07-25 DIAGNOSIS — Z91.199 NONCOMPLIANCE: ICD-10-CM

## 2024-07-25 DIAGNOSIS — I89.0 CHRONIC ACQUIRED LYMPHEDEMA: Primary | ICD-10-CM

## 2024-07-25 DIAGNOSIS — I89.0 LYMPHEDEMA: ICD-10-CM

## 2024-07-25 DIAGNOSIS — I83.009 VENOUS ULCER (HCC): ICD-10-CM

## 2024-07-25 PROCEDURE — 11045 DBRDMT SUBQ TISS EACH ADDL: CPT

## 2024-07-25 PROCEDURE — 6370000000 HC RX 637 (ALT 250 FOR IP): Performed by: SPECIALIST

## 2024-07-25 PROCEDURE — 29581 APPL MULTLAYER CMPRN SYS LEG: CPT

## 2024-07-25 PROCEDURE — 11042 DBRDMT SUBQ TIS 1ST 20SQCM/<: CPT

## 2024-07-25 RX ORDER — SODIUM CHLOR/HYPOCHLOROUS ACID 0.033 %
SOLUTION, IRRIGATION IRRIGATION ONCE
OUTPATIENT
Start: 2024-07-25 | End: 2024-07-25

## 2024-07-25 RX ORDER — GINSENG 100 MG
CAPSULE ORAL ONCE
OUTPATIENT
Start: 2024-07-25 | End: 2024-07-25

## 2024-07-25 RX ORDER — IBUPROFEN 200 MG
TABLET ORAL ONCE
OUTPATIENT
Start: 2024-07-25 | End: 2024-07-25

## 2024-07-25 RX ORDER — LIDOCAINE HYDROCHLORIDE 40 MG/ML
SOLUTION TOPICAL ONCE
Status: COMPLETED | OUTPATIENT
Start: 2024-07-25 | End: 2024-07-25

## 2024-07-25 RX ORDER — LIDOCAINE 40 MG/G
CREAM TOPICAL ONCE
OUTPATIENT
Start: 2024-07-25 | End: 2024-07-25

## 2024-07-25 RX ORDER — BETAMETHASONE DIPROPIONATE 0.05 %
OINTMENT (GRAM) TOPICAL ONCE
OUTPATIENT
Start: 2024-07-25 | End: 2024-07-25

## 2024-07-25 RX ORDER — GENTAMICIN SULFATE 1 MG/G
OINTMENT TOPICAL ONCE
Status: COMPLETED | OUTPATIENT
Start: 2024-07-25 | End: 2024-07-25

## 2024-07-25 RX ORDER — SODIUM CHLOR/HYPOCHLOROUS ACID 0.033 %
SOLUTION, IRRIGATION IRRIGATION ONCE
Status: COMPLETED | OUTPATIENT
Start: 2024-07-25 | End: 2024-07-25

## 2024-07-25 RX ORDER — TRIAMCINOLONE ACETONIDE 1 MG/G
OINTMENT TOPICAL ONCE
OUTPATIENT
Start: 2024-07-25 | End: 2024-07-25

## 2024-07-25 RX ORDER — LIDOCAINE 50 MG/G
OINTMENT TOPICAL ONCE
OUTPATIENT
Start: 2024-07-25 | End: 2024-07-25

## 2024-07-25 RX ORDER — CLOBETASOL PROPIONATE 0.5 MG/G
OINTMENT TOPICAL ONCE
OUTPATIENT
Start: 2024-07-25 | End: 2024-07-25

## 2024-07-25 RX ORDER — LIDOCAINE HYDROCHLORIDE 20 MG/ML
JELLY TOPICAL ONCE
OUTPATIENT
Start: 2024-07-25 | End: 2024-07-25

## 2024-07-25 RX ORDER — LIDOCAINE HYDROCHLORIDE 40 MG/ML
SOLUTION TOPICAL ONCE
OUTPATIENT
Start: 2024-07-25 | End: 2024-07-25

## 2024-07-25 RX ORDER — BACITRACIN ZINC AND POLYMYXIN B SULFATE 500; 1000 [USP'U]/G; [USP'U]/G
OINTMENT TOPICAL ONCE
OUTPATIENT
Start: 2024-07-25 | End: 2024-07-25

## 2024-07-25 RX ORDER — GENTAMICIN SULFATE 1 MG/G
OINTMENT TOPICAL ONCE
OUTPATIENT
Start: 2024-07-25 | End: 2024-07-25

## 2024-07-25 RX ADMIN — LIDOCAINE HYDROCHLORIDE: 40 SOLUTION TOPICAL at 08:25

## 2024-07-25 RX ADMIN — GENTAMICIN SULFATE: 1 OINTMENT TOPICAL at 09:14

## 2024-07-25 RX ADMIN — Medication: at 08:49

## 2024-07-25 NOTE — PATIENT INSTRUCTIONS
have questions about your wound care, please contact the [facility] at [phone].   Hours of operation:  Mon:  8AM - 2PM  Tue: 11AM - 5PM  Wed: CLOSED  Thur: 8AM - 4:30PM  Fri:  8AM - 4:30PM  The office is closed on all major holidays.    Please give us 24-48 business hours to return your call.  These hours of operation are subject to change. If you need help with your wounds and cannot wait until we are available, contact your PCP or go to your preferred emergency room.     Call your doctor now or seek immediate medical care if:    You have symptoms of infection, such as:  Increased pain, swelling, warmth, or redness.  Red streaks leading from the area.  Pus draining from the area.  A fever.

## 2024-08-01 ENCOUNTER — HOSPITAL ENCOUNTER (OUTPATIENT)
Dept: WOUND CARE | Age: 59
Discharge: HOME | End: 2024-08-01
Attending: SPECIALIST
Payer: COMMERCIAL

## 2024-08-01 VITALS
RESPIRATION RATE: 16 BRPM | WEIGHT: 260.14 LBS | DIASTOLIC BLOOD PRESSURE: 73 MMHG | HEART RATE: 67 BPM | BODY MASS INDEX: 41.99 KG/M2 | TEMPERATURE: 97.1 F | SYSTOLIC BLOOD PRESSURE: 120 MMHG

## 2024-08-01 DIAGNOSIS — I89.0 LYMPHEDEMA: ICD-10-CM

## 2024-08-01 DIAGNOSIS — I89.0 CHRONIC ACQUIRED LYMPHEDEMA: Primary | ICD-10-CM

## 2024-08-01 DIAGNOSIS — Z91.199 NONCOMPLIANCE: ICD-10-CM

## 2024-08-01 DIAGNOSIS — I83.009 VENOUS ULCER (HCC): ICD-10-CM

## 2024-08-01 DIAGNOSIS — L97.909 VENOUS ULCER (HCC): ICD-10-CM

## 2024-08-01 DIAGNOSIS — I87.331 IDIOPATHIC CHRONIC VENOUS HYPERTENSION OF RIGHT LOWER EXTREMITY WITH ULCER AND INFLAMMATION (HCC): ICD-10-CM

## 2024-08-01 PROCEDURE — 11045 DBRDMT SUBQ TISS EACH ADDL: CPT

## 2024-08-01 PROCEDURE — 11042 DBRDMT SUBQ TIS 1ST 20SQCM/<: CPT | Performed by: SPECIALIST

## 2024-08-01 PROCEDURE — 11045 DBRDMT SUBQ TISS EACH ADDL: CPT | Performed by: SPECIALIST

## 2024-08-01 PROCEDURE — 6370000000 HC RX 637 (ALT 250 FOR IP): Performed by: SPECIALIST

## 2024-08-01 PROCEDURE — 29581 APPL MULTLAYER CMPRN SYS LEG: CPT

## 2024-08-01 PROCEDURE — 11042 DBRDMT SUBQ TIS 1ST 20SQCM/<: CPT

## 2024-08-01 RX ORDER — LIDOCAINE HYDROCHLORIDE 20 MG/ML
JELLY TOPICAL ONCE
OUTPATIENT
Start: 2024-08-01 | End: 2024-08-01

## 2024-08-01 RX ORDER — LIDOCAINE HYDROCHLORIDE 40 MG/ML
SOLUTION TOPICAL ONCE
Status: COMPLETED | OUTPATIENT
Start: 2024-08-01 | End: 2024-08-01

## 2024-08-01 RX ORDER — CLOBETASOL PROPIONATE 0.5 MG/G
OINTMENT TOPICAL ONCE
OUTPATIENT
Start: 2024-08-01 | End: 2024-08-01

## 2024-08-01 RX ORDER — TRIAMCINOLONE ACETONIDE 1 MG/G
OINTMENT TOPICAL ONCE
OUTPATIENT
Start: 2024-08-01 | End: 2024-08-01

## 2024-08-01 RX ORDER — LIDOCAINE 40 MG/G
CREAM TOPICAL ONCE
OUTPATIENT
Start: 2024-08-01 | End: 2024-08-01

## 2024-08-01 RX ORDER — GINSENG 100 MG
CAPSULE ORAL ONCE
OUTPATIENT
Start: 2024-08-01 | End: 2024-08-01

## 2024-08-01 RX ORDER — LIDOCAINE HYDROCHLORIDE 40 MG/ML
SOLUTION TOPICAL ONCE
OUTPATIENT
Start: 2024-08-01 | End: 2024-08-01

## 2024-08-01 RX ORDER — IBUPROFEN 200 MG
TABLET ORAL ONCE
OUTPATIENT
Start: 2024-08-01 | End: 2024-08-01

## 2024-08-01 RX ORDER — BETAMETHASONE DIPROPIONATE 0.05 %
OINTMENT (GRAM) TOPICAL ONCE
OUTPATIENT
Start: 2024-08-01 | End: 2024-08-01

## 2024-08-01 RX ORDER — GENTAMICIN SULFATE 1 MG/G
OINTMENT TOPICAL ONCE
OUTPATIENT
Start: 2024-08-01 | End: 2024-08-01

## 2024-08-01 RX ORDER — SODIUM CHLOR/HYPOCHLOROUS ACID 0.033 %
SOLUTION, IRRIGATION IRRIGATION ONCE
OUTPATIENT
Start: 2024-08-01 | End: 2024-08-01

## 2024-08-01 RX ORDER — BACITRACIN ZINC AND POLYMYXIN B SULFATE 500; 1000 [USP'U]/G; [USP'U]/G
OINTMENT TOPICAL ONCE
OUTPATIENT
Start: 2024-08-01 | End: 2024-08-01

## 2024-08-01 RX ORDER — LIDOCAINE 50 MG/G
OINTMENT TOPICAL ONCE
OUTPATIENT
Start: 2024-08-01 | End: 2024-08-01

## 2024-08-01 RX ADMIN — LIDOCAINE HYDROCHLORIDE: 40 SOLUTION TOPICAL at 08:11

## 2024-08-01 ASSESSMENT — PAIN DESCRIPTION - ORIENTATION: ORIENTATION: RIGHT

## 2024-08-01 ASSESSMENT — PAIN DESCRIPTION - DESCRIPTORS: DESCRIPTORS: BURNING

## 2024-08-01 ASSESSMENT — PAIN DESCRIPTION - LOCATION: LOCATION: LEG

## 2024-08-01 ASSESSMENT — PAIN DESCRIPTION - PAIN TYPE: TYPE: CHRONIC PAIN

## 2024-08-01 ASSESSMENT — PAIN SCALES - GENERAL: PAINLEVEL_OUTOF10: 6

## 2024-08-01 ASSESSMENT — PAIN DESCRIPTION - FREQUENCY: FREQUENCY: CONTINUOUS

## 2024-08-01 NOTE — PATIENT INSTRUCTIONS
Wound Care Center Physician Orders and Discharge Instructions  [facility]   [address] [place]  Telephone: [phone] FAX [phone]  NAME:  Justin Baeza  YOB: 1965  MEDICAL RECORD NUMBER:  [id number]  DATE: 8/1/2024      Return Appointment:  Return Appointment: With Khang Giles MD  in  1 Week(s)  [x] Return Appointment for a Wound Assessment with the nurse on: 08/05/24    Future Appointments   Date Time Provider Department Center   8/15/2024  9:30 AM [facility] MEDICATION MANAGEMENT [facility]             Orders Placed This Encounter   Procedures    Initiate Outpatient Wound Care Protocol       Home Care Company: none    Medically necessary services for evaluation and treatment: []Skilled Nursing (using clean technique) []PT (Eval & Treat) []OT (Eval & Treat) []Social Work []Dietician []Other:      Wound care instructions:  If you smoke we ask that you refrain from smoking. Smoking inhibits wounds from healing.  When taking antibiotics take the entire prescription as ordered. Do not stop taking until medication is all gone unless otherwise instructed.   Exercise as tolerated.   Keep weight off wounds and reposition every 2 hours if applicable.  Do not get wounds wet in the bath or shower unless otherwise instructed by your physician. If your wound is on your foot or leg, you may purchase a cast bag. Please ask at the pharmacy.  Wash hands with soap and water prior to and after every dressing change.    [x]Wash wounds with: No need to wash. Leave dressing in place.    [x]Kiah wound Topical Treatments: Do not apply lotions, creams, or ointments to the skin around the wound bed unless directed as followed:   Apply around the wound: zinc paste- done by dr. Giles in clinic          [x]Wound Location: right lower leg wounds  Apply Primary Dressing to wound: polymem ag  Secondary Dressing: Extra absorbant pad   Avoid contact of tape with skin

## 2024-08-01 NOTE — PROGRESS NOTES
[facility]  Progress Note and Procedure Note      Justin Baeza  MEDICAL RECORD NUMBER:  [id number]  AGE: 59 y.o.   GENDER: male  : 1965  EPISODE DATE:  2024    Subjective:     Chief Complaint   Patient presents with    Wound Check     Right lower leg         HISTORY of PRESENT ILLNESS HPI     Justin Baeza is a 59 y.o. male who presents today for wound/ulcer evaluation.   History of Wound Context: Patient continues follow-up for chronic venous insufficiency ulceration and lymphedema right lower extremity. He is recently off his antibiotics as per Dr. Agustin   Wound/Ulcer Pain Timing/Severity: none  Quality of pain: N/A  Severity:  0 / 10   Modifying Factors: None  Associated Signs/Symptoms: erythema and drainage    Ulcer Identification:  Ulcer Type: venous    Contributing Factors: edema, venous stasis, lymphedema, obesity, and anticoagulation therapy    Acute Wound: N/A not an acute wound    PAST MEDICAL HISTORY        Diagnosis Date    DVT (deep venous thrombosis) (HCC)     Hx of blood clots     Pain and swelling of right lower leg        PAST SURGICAL HISTORY    Past Surgical History:   Procedure Laterality Date    BALLOON ANGIOPLASTY, ARTERY Right 2017    at [facility]    DILATATION, ESOPHAGUS      SKIN SPLIT GRAFT Right        FAMILY HISTORY    Family History   Problem Relation Age of Onset    Diabetes Mother     Heart Attack Father        SOCIAL HISTORY    Social History     Tobacco Use    Smoking status: Never    Smokeless tobacco: Never   Vaping Use    Vaping Use: Never used   Substance Use Topics    Alcohol use: No    Drug use: No       ALLERGIES    No Known Allergies    MEDICATIONS    Current Outpatient Medications on File Prior to Encounter   Medication Sig Dispense Refill    warfarin (COUMADIN) 5 MG tablet Take 7.5 mg (1.5 tablets) every day except take 5 mg (1 tablet) on  and  or as directed by the anticoagulation clinic (Patient taking

## 2024-08-01 NOTE — PROGRESS NOTES
Multilayer Compression Wrap   (Not Unna) Below the Knee    NAME:  Justin Baeza  YOB: 1965  MEDICAL RECORD NUMBER:  [id number]  DATE:  8/1/2024       Removed old Multilayer wrap if present and washed leg with mild soap/water.   Applied moisturizing agent to dry skin as needed.    Applied primary and secondary dressing as ordered    Applied multilayered dressing below the knee to Right lower leg(s)  (4 Layer Compression Wrap ) .    Instructed patient/caregiver not to remove dressing and to keep it clean and dry.    Instructed patient/caregiver on complications to report to provider, such as pain, numbness in toes, heavy drainage, and slippage of dressing.    Instructed patient on purpose of compression dressing and on activity and exercise recommendations.   Applied per   Guidelines    Electronically signed by Kathy Gomez RN on 8/1/2024 at 9:11 AM

## 2024-08-05 ENCOUNTER — HOSPITAL ENCOUNTER (OUTPATIENT)
Dept: WOUND CARE | Age: 59
Discharge: HOME | End: 2024-08-05
Attending: SPECIALIST
Payer: COMMERCIAL

## 2024-08-05 VITALS
RESPIRATION RATE: 16 BRPM | SYSTOLIC BLOOD PRESSURE: 134 MMHG | DIASTOLIC BLOOD PRESSURE: 77 MMHG | HEART RATE: 67 BPM | TEMPERATURE: 97.3 F

## 2024-08-05 PROCEDURE — 29581 APPL MULTLAYER CMPRN SYS LEG: CPT

## 2024-08-05 ASSESSMENT — PAIN DESCRIPTION - FREQUENCY: FREQUENCY: CONTINUOUS

## 2024-08-05 ASSESSMENT — PAIN DESCRIPTION - PAIN TYPE: TYPE: CHRONIC PAIN

## 2024-08-05 ASSESSMENT — PAIN DESCRIPTION - ORIENTATION: ORIENTATION: RIGHT

## 2024-08-05 ASSESSMENT — PAIN SCALES - GENERAL: PAINLEVEL_OUTOF10: 7

## 2024-08-05 ASSESSMENT — PAIN DESCRIPTION - LOCATION: LOCATION: LEG

## 2024-08-05 ASSESSMENT — PAIN DESCRIPTION - DESCRIPTORS: DESCRIPTORS: BURNING

## 2024-08-05 NOTE — PROGRESS NOTES
Multilayer Compression Wrap   (Not Unna) Below the Knee    NAME:  Justin Baeza  YOB: 1965  MEDICAL RECORD NUMBER:  [id number]  DATE:  8/5/2024       Removed old Multilayer wrap if present and washed leg with mild soap/water.   Applied moisturizing agent to dry skin as needed.    Applied primary and secondary dressing as ordered    Applied multilayered dressing below the knee to Right lower leg(s)  (4 Layer Compression Wrap ) .    Instructed patient/caregiver not to remove dressing and to keep it clean and dry.    Instructed patient/caregiver on complications to report to provider, such as pain, numbness in toes, heavy drainage, and slippage of dressing.    Instructed patient on purpose of compression dressing and on activity and exercise recommendations.   Applied per   Guidelines    Electronically signed by Mera Coelho RN on 8/5/2024 at 8:26 AM

## 2024-08-08 ENCOUNTER — HOSPITAL ENCOUNTER (OUTPATIENT)
Dept: WOUND CARE | Age: 59
Discharge: HOME | End: 2024-08-08
Attending: SPECIALIST
Payer: COMMERCIAL

## 2024-08-08 VITALS
HEART RATE: 73 BPM | RESPIRATION RATE: 16 BRPM | TEMPERATURE: 97 F | DIASTOLIC BLOOD PRESSURE: 81 MMHG | SYSTOLIC BLOOD PRESSURE: 128 MMHG

## 2024-08-08 DIAGNOSIS — I89.0 CHRONIC ACQUIRED LYMPHEDEMA: Primary | ICD-10-CM

## 2024-08-08 DIAGNOSIS — I87.331 IDIOPATHIC CHRONIC VENOUS HYPERTENSION OF RIGHT LOWER EXTREMITY WITH ULCER AND INFLAMMATION (HCC): ICD-10-CM

## 2024-08-08 DIAGNOSIS — I89.0 LYMPHEDEMA: ICD-10-CM

## 2024-08-08 DIAGNOSIS — L97.909 VENOUS ULCER (HCC): ICD-10-CM

## 2024-08-08 DIAGNOSIS — I83.009 VENOUS ULCER (HCC): ICD-10-CM

## 2024-08-08 DIAGNOSIS — Z91.199 NONCOMPLIANCE: ICD-10-CM

## 2024-08-08 PROCEDURE — 11045 DBRDMT SUBQ TISS EACH ADDL: CPT

## 2024-08-08 PROCEDURE — 29581 APPL MULTLAYER CMPRN SYS LEG: CPT

## 2024-08-08 PROCEDURE — 11042 DBRDMT SUBQ TIS 1ST 20SQCM/<: CPT | Performed by: SPECIALIST

## 2024-08-08 PROCEDURE — 87070 CULTURE OTHR SPECIMN AEROBIC: CPT

## 2024-08-08 PROCEDURE — 6370000000 HC RX 637 (ALT 250 FOR IP): Performed by: SPECIALIST

## 2024-08-08 PROCEDURE — 87186 SC STD MICRODIL/AGAR DIL: CPT

## 2024-08-08 PROCEDURE — 11042 DBRDMT SUBQ TIS 1ST 20SQCM/<: CPT

## 2024-08-08 PROCEDURE — 87077 CULTURE AEROBIC IDENTIFY: CPT

## 2024-08-08 PROCEDURE — 11045 DBRDMT SUBQ TISS EACH ADDL: CPT | Performed by: SPECIALIST

## 2024-08-08 RX ORDER — LIDOCAINE HYDROCHLORIDE 20 MG/ML
JELLY TOPICAL ONCE
OUTPATIENT
Start: 2024-08-08 | End: 2024-08-08

## 2024-08-08 RX ORDER — BETAMETHASONE DIPROPIONATE 0.05 %
OINTMENT (GRAM) TOPICAL ONCE
OUTPATIENT
Start: 2024-08-08 | End: 2024-08-08

## 2024-08-08 RX ORDER — LIDOCAINE HYDROCHLORIDE 40 MG/ML
SOLUTION TOPICAL ONCE
OUTPATIENT
Start: 2024-08-08 | End: 2024-08-08

## 2024-08-08 RX ORDER — CLOBETASOL PROPIONATE 0.5 MG/G
OINTMENT TOPICAL ONCE
OUTPATIENT
Start: 2024-08-08 | End: 2024-08-08

## 2024-08-08 RX ORDER — GINSENG 100 MG
CAPSULE ORAL ONCE
OUTPATIENT
Start: 2024-08-08 | End: 2024-08-08

## 2024-08-08 RX ORDER — LIDOCAINE 50 MG/G
OINTMENT TOPICAL ONCE
OUTPATIENT
Start: 2024-08-08 | End: 2024-08-08

## 2024-08-08 RX ORDER — LIDOCAINE HYDROCHLORIDE 40 MG/ML
SOLUTION TOPICAL ONCE
Status: COMPLETED | OUTPATIENT
Start: 2024-08-08 | End: 2024-08-08

## 2024-08-08 RX ORDER — TRIAMCINOLONE ACETONIDE 1 MG/G
OINTMENT TOPICAL ONCE
OUTPATIENT
Start: 2024-08-08 | End: 2024-08-08

## 2024-08-08 RX ORDER — IBUPROFEN 200 MG
TABLET ORAL ONCE
OUTPATIENT
Start: 2024-08-08 | End: 2024-08-08

## 2024-08-08 RX ORDER — SODIUM CHLOR/HYPOCHLOROUS ACID 0.033 %
SOLUTION, IRRIGATION IRRIGATION ONCE
OUTPATIENT
Start: 2024-08-08 | End: 2024-08-08

## 2024-08-08 RX ORDER — LIDOCAINE 40 MG/G
CREAM TOPICAL ONCE
OUTPATIENT
Start: 2024-08-08 | End: 2024-08-08

## 2024-08-08 RX ORDER — BACITRACIN ZINC AND POLYMYXIN B SULFATE 500; 1000 [USP'U]/G; [USP'U]/G
OINTMENT TOPICAL ONCE
OUTPATIENT
Start: 2024-08-08 | End: 2024-08-08

## 2024-08-08 RX ORDER — GENTAMICIN SULFATE 1 MG/G
OINTMENT TOPICAL ONCE
OUTPATIENT
Start: 2024-08-08 | End: 2024-08-08

## 2024-08-08 RX ADMIN — LIDOCAINE HYDROCHLORIDE: 40 SOLUTION TOPICAL at 08:20

## 2024-08-08 ASSESSMENT — PAIN DESCRIPTION - LOCATION: LOCATION: LEG

## 2024-08-08 ASSESSMENT — PAIN DESCRIPTION - DESCRIPTORS: DESCRIPTORS: BURNING

## 2024-08-08 ASSESSMENT — PAIN DESCRIPTION - FREQUENCY: FREQUENCY: CONTINUOUS

## 2024-08-08 ASSESSMENT — PAIN SCALES - GENERAL: PAINLEVEL_OUTOF10: 7

## 2024-08-08 ASSESSMENT — PAIN DESCRIPTION - PAIN TYPE: TYPE: CHRONIC PAIN

## 2024-08-08 ASSESSMENT — PAIN DESCRIPTION - ONSET: ONSET: ON-GOING

## 2024-08-08 ASSESSMENT — PAIN DESCRIPTION - ORIENTATION: ORIENTATION: RIGHT

## 2024-08-08 NOTE — PROGRESS NOTES
Multilayer Compression Wrap   (Not Unna) Below the Knee    NAME:  Justin Baeza  YOB: 1965  MEDICAL RECORD NUMBER:  [id number]  DATE:  8/8/2024       Removed old Multilayer wrap if present and washed leg with mild soap/water.   Applied moisturizing agent to dry skin as needed.    Applied primary and secondary dressing as ordered    Applied multilayered dressing below the knee to Right lower leg(s)  (4 Layer Compression Wrap ) .    Instructed patient/caregiver not to remove dressing and to keep it clean and dry.    Instructed patient/caregiver on complications to report to provider, such as pain, numbness in toes, heavy drainage, and slippage of dressing.    Instructed patient on purpose of compression dressing and on activity and exercise recommendations.   Applied per   Guidelines    Electronically signed by Mera Coelho RN on 8/8/2024 at 8:50 AM    
Foam impregnated with Ag 08/01/24 0910   Wound Length (cm) 1 cm 08/08/24 0816   Wound Width (cm) 3.5 cm 08/08/24 0816   Wound Depth (cm) 0.1 cm 08/08/24 0816   Wound Surface Area (cm^2) 3.5 cm^2 08/08/24 0816   Change in Wound Size % (l*w) 92.32 08/08/24 0816   Wound Volume (cm^3) 0.35 cm^3 08/08/24 0816   Wound Healing % 92 08/08/24 0816   Post-Procedure Length (cm) 1.1 cm 08/08/24 0836   Post-Procedure Width (cm) 3.6 cm 08/08/24 0836   Post-Procedure Depth (cm) 0.3 cm 08/08/24 0836   Post-Procedure Surface Area (cm^2) 3.96 cm^2 08/08/24 0836   Post-Procedure Volume (cm^3) 1.188 cm^3 08/08/24 0836   Distance Tunneling (cm) 0 cm 07/25/24 0817   Tunneling Position ___ O'Clock 0 06/06/24 0819   Undermining Starts ___ O'Clock 0 06/06/24 0819   Undermining Ends___ O'Clock 0 06/06/24 0819   Undermining Maxium Distance (cm) 0 07/25/24 0817   Wound Assessment Pink/red;Fibrin;Granulation tissue 08/08/24 0816   Drainage Amount Moderate (25-50%) 08/08/24 0816   Drainage Description Brown;Serosanguinous;Green 08/08/24 0816   Odor Mild 08/08/24 0816   Kiah-wound Assessment Maceration 08/08/24 0816   Margins Defined edges 08/08/24 0816   Wound Thickness Description not for Pressure Injury Full thickness 08/08/24 0816   Number of days: 744       Wound 07/25/22 Leg Right;Posterior;Lower #3 cluster (Active)   Wound Image   08/01/24 0813   Wound Etiology Venous 07/25/22 1040   Dressing Status New dressing applied 02/08/24 0843   Wound Cleansed Soap and water 08/08/24 0816   Dressing/Treatment Foam impregnated with Ag 08/01/24 0910   Wound Length (cm) 3 cm 08/08/24 0816   Wound Width (cm) 7.5 cm 08/08/24 0816   Wound Depth (cm) 0.1 cm 08/08/24 0816   Wound Surface Area (cm^2) 22.5 cm^2 08/08/24 0816   Change in Wound Size % (l*w) 48.86 08/08/24 0816   Wound Volume (cm^3) 2.25 cm^3 08/08/24 0816   Wound Healing % 49 08/08/24 0816   Post-Procedure Length (cm) 3.1 cm 08/08/24 0836   Post-Procedure Width (cm) 7.6 cm 08/08/24 0836

## 2024-08-08 NOTE — PATIENT INSTRUCTIONS
Wound Care Center Physician Orders and Discharge Instructions  [facility]   [address] [place]  Telephone: [phone] FAX [phone]  NAME:  Justin Baeza  YOB: 1965  MEDICAL RECORD NUMBER:  [id number]  DATE: 8/8/2024      Return Appointment:  Return Appointment: With Khang Giles MD  in  2 Week(s)  [x] Return Appointment for a Wound Assessment with the nurse on: 08/12/24    Future Appointments   Date Time Provider Department Center   8/15/2024  9:30 AM [facility] MEDICATION MANAGEMENT [facility]             Orders Placed This Encounter   Procedures    Initiate Outpatient Wound Care Protocol       Home Care Company: none    Medically necessary services for evaluation and treatment: []Skilled Nursing (using clean technique) []PT (Eval & Treat) []OT (Eval & Treat) []Social Work []Dietician []Other:      Wound care instructions:  If you smoke we ask that you refrain from smoking. Smoking inhibits wounds from healing.  When taking antibiotics take the entire prescription as ordered. Do not stop taking until medication is all gone unless otherwise instructed.   Exercise as tolerated.   Keep weight off wounds and reposition every 2 hours if applicable.  Do not get wounds wet in the bath or shower unless otherwise instructed by your physician. If your wound is on your foot or leg, you may purchase a cast bag. Please ask at the pharmacy.  Wash hands with soap and water prior to and after every dressing change.    [x]Wash wounds with: No need to wash. Leave dressing in place.    [x]Kiah wound Topical Treatments: Do not apply lotions, creams, or ointments to the skin around the wound bed unless directed as followed:   Apply around the wound: zinc paste- done by dr. Giles in clinic          [x]Wound Location: right lower leg wounds  Apply Primary Dressing to wound: polymem ag  Secondary Dressing: Extra absorbant pad   Avoid contact of tape with skin

## 2024-08-11 LAB
BACTERIA SPEC AEROBE CULT: ABNORMAL
BACTERIA SPEC AEROBE CULT: ABNORMAL
BACTERIA SPEC ANAEROBE CULT: ABNORMAL
GRAM STAIN RESULT: ABNORMAL
ORGANISM: ABNORMAL
ORGANISM: ABNORMAL

## 2024-08-12 ENCOUNTER — HOSPITAL ENCOUNTER (OUTPATIENT)
Dept: WOUND CARE | Age: 59
Discharge: HOME | End: 2024-08-12
Attending: SPECIALIST
Payer: COMMERCIAL

## 2024-08-12 VITALS
SYSTOLIC BLOOD PRESSURE: 126 MMHG | HEART RATE: 63 BPM | DIASTOLIC BLOOD PRESSURE: 77 MMHG | RESPIRATION RATE: 16 BRPM | TEMPERATURE: 97 F

## 2024-08-12 PROCEDURE — 29581 APPL MULTLAYER CMPRN SYS LEG: CPT

## 2024-08-12 RX ORDER — LEVOFLOXACIN 500 MG/1
500 TABLET, FILM COATED ORAL DAILY
Qty: 10 TABLET | Refills: 0 | Status: SHIPPED | OUTPATIENT
Start: 2024-08-12 | End: 2024-08-22

## 2024-08-12 ASSESSMENT — PAIN SCALES - GENERAL: PAINLEVEL_OUTOF10: 6

## 2024-08-12 ASSESSMENT — PAIN DESCRIPTION - DESCRIPTORS: DESCRIPTORS: BURNING

## 2024-08-12 ASSESSMENT — PAIN DESCRIPTION - FREQUENCY: FREQUENCY: CONTINUOUS

## 2024-08-12 ASSESSMENT — PAIN DESCRIPTION - ORIENTATION: ORIENTATION: RIGHT

## 2024-08-12 ASSESSMENT — PAIN DESCRIPTION - LOCATION: LOCATION: LEG

## 2024-08-12 ASSESSMENT — PAIN DESCRIPTION - PAIN TYPE: TYPE: CHRONIC PAIN

## 2024-08-12 NOTE — PROGRESS NOTES
Multilayer Compression Wrap   (Not Unna) Below the Knee    NAME:  Justin Baeza  YOB: 1965  MEDICAL RECORD NUMBER:  [id number]  DATE:  8/12/2024       Removed old Multilayer wrap if present and washed leg with mild soap/water.   Applied moisturizing agent to dry skin as needed.    Applied primary and secondary dressing as ordered    Applied multilayered dressing below the knee to Right lower leg(s)  (4 Layer Compression Wrap ) .    Instructed patient/caregiver not to remove dressing and to keep it clean and dry.    Instructed patient/caregiver on complications to report to provider, such as pain, numbness in toes, heavy drainage, and slippage of dressing.    Instructed patient on purpose of compression dressing and on activity and exercise recommendations.   Applied per   Guidelines    Electronically signed by Mera Coelho RN on 8/12/2024 at 8:24 AM

## 2024-08-12 NOTE — PROGRESS NOTES
Dr Giles spoke with pt in regards to wound culture and results. Rx sent to pharmacy. Pt verbalized understanding.

## 2024-08-15 ENCOUNTER — ANTI-COAG VISIT (OUTPATIENT)
Dept: PHARMACY | Age: 59
End: 2024-08-15
Payer: COMMERCIAL

## 2024-08-15 DIAGNOSIS — I82.5Z1 CHRONIC VENOUS EMBOLISM AND THROMBOSIS OF DEEP VESSELS OF DISTAL END OF RIGHT LOWER EXTREMITY (HCC): Primary | ICD-10-CM

## 2024-08-15 LAB
BACTERIA SPEC AEROBE CULT: ABNORMAL
BACTERIA SPEC ANAEROBE CULT: ABNORMAL
GRAM STAIN RESULT: ABNORMAL
INTERNATIONAL NORMALIZATION RATIO, POC: 2.7
ORGANISM: ABNORMAL
PROTHROMBIN TIME, POC: 0

## 2024-08-15 PROCEDURE — 99211 OFF/OP EST MAY X REQ PHY/QHP: CPT | Performed by: PHARMACIST

## 2024-08-15 PROCEDURE — 85610 PROTHROMBIN TIME: CPT | Performed by: PHARMACIST

## 2024-08-15 NOTE — PROGRESS NOTES
ANTICOAGULATION SERVICE    Justin Baeza is a 59 y.o. male with PMHx significant for chronic DVT (last in Jan, 2019) who presents to clinic 8/15/2024 for anticoagulation monitoring and adjustment.  Patient has been taking warfarin for 15+ years.     Anticoagulation Indication(s):  DVT    Referring Physician:  Dr. Orozco - needs to establish with new PCP   Goal INR Range:  2-3  Duration of Anticoagulation Therapy:  Indefinite  Time of day dose taken:  AM  Product patient has at home:  warfarin 5 mg (peach)    INR Summary                            Warfarin regimen (mg)  Date INR   A/P    Sun Mon Tue Wed Thu Fri Sat Mg/wk  8/15 2.7 At goal, continue   7.5 5 7.5 7.5 7.5 5 7.5 47.5  7/3 2.4 At goal, continue   7.5 5 7.5 7.5 7.5 5 7.5 47.5  3/14 2.6 At goal, continue   7.5 5 7.5 7.5 7.5 5 7.5 47.5  2/12 2.2 At goal, continue   7.5 5 7.5 7.5 7.5 5 7.5 47.5  2/8 1.82 Per [facility]   2/5 3.9 Above goal, hold   7.5 5 0/7.5 5/7.5 7.5 5 7.5 47.5  1/17 3.4 Above goal, continue  7.5 5 7.5 7.5 7.5 5 7.5 47.5  11/22 2.7 At goal, continue  7.5 5 7.5 7.5 7.5 5 7.5 47.5  10/11 2.9 At goal, continue  7.5 5 7.5 7.5 7.5 5 7.5 47.5  8/28 2.7  At goal, continue  7.5 5 7.5 7.5 7.5 5 7.5 47.5  8/2 2.37 Per lab    7/7 2.4  At goal, continue  7.5 5 7.5 7.5 7.5 5 7.5 47.5  5/26 2.7  At goal, continue  7.5 5 7.5 7.5 7.5 5 7.5 47.5  4/28 3.2 Above goal, continue  7.5 5 7.5 7.5 7.5 5 7.5 47.5  2/16 2.9 At goal, continue  7.5 5 7.5 7.5 7.5 5 7.5 47.5  1/9 2.9 At goal, continue  7.5 5 7.5 7.5 7.5 5 7.5 47.5  12/5 2.5 At goal, continue  7.5 5 7.5 7.5 7.5 5 7.5 47.5  11/7 2.3 At goal, continue  7.5 5 7.5 7.5 7.5 5 7.5 47.5  10/10 2.9 At goal, continue  7.5 5 7.5 7.5 7.5 5 7.5 47.5  7/27 3.1 Above goal, continue    7.5 5 7.5 7.5 7.5 5 7.5 47.5  7/6 3.1 Above goal, decrease  7.5 5 7.5 7.5 7.5 5 7.5 47.5  6/22 2.4 At goal, resume prv dose 7.5 7.5 7.5 7.5 7.5 5 7.5 50  6/1 2.6 At goal, continue   7.5 5 7.5 5 7.5 5 7.5 45  5/18 4.1 Above goal, dec

## 2024-08-19 ENCOUNTER — HOSPITAL ENCOUNTER (OUTPATIENT)
Dept: WOUND CARE | Age: 59
Discharge: HOME | End: 2024-08-19
Attending: SPECIALIST
Payer: COMMERCIAL

## 2024-08-19 VITALS
HEART RATE: 71 BPM | TEMPERATURE: 97.4 F | DIASTOLIC BLOOD PRESSURE: 78 MMHG | RESPIRATION RATE: 16 BRPM | SYSTOLIC BLOOD PRESSURE: 129 MMHG

## 2024-08-19 DIAGNOSIS — Z91.199 NONCOMPLIANCE: ICD-10-CM

## 2024-08-19 DIAGNOSIS — I89.0 CHRONIC ACQUIRED LYMPHEDEMA: Primary | ICD-10-CM

## 2024-08-19 DIAGNOSIS — L97.909 VENOUS ULCER (HCC): ICD-10-CM

## 2024-08-19 DIAGNOSIS — I89.0 LYMPHEDEMA: ICD-10-CM

## 2024-08-19 DIAGNOSIS — I83.009 VENOUS ULCER (HCC): ICD-10-CM

## 2024-08-19 DIAGNOSIS — I87.331 IDIOPATHIC CHRONIC VENOUS HYPERTENSION OF RIGHT LOWER EXTREMITY WITH ULCER AND INFLAMMATION (HCC): ICD-10-CM

## 2024-08-19 PROCEDURE — 11042 DBRDMT SUBQ TIS 1ST 20SQCM/<: CPT

## 2024-08-19 PROCEDURE — 11045 DBRDMT SUBQ TISS EACH ADDL: CPT

## 2024-08-19 PROCEDURE — 11045 DBRDMT SUBQ TISS EACH ADDL: CPT | Performed by: SPECIALIST

## 2024-08-19 PROCEDURE — 29581 APPL MULTLAYER CMPRN SYS LEG: CPT

## 2024-08-19 PROCEDURE — 6370000000 HC RX 637 (ALT 250 FOR IP): Performed by: SPECIALIST

## 2024-08-19 PROCEDURE — 11042 DBRDMT SUBQ TIS 1ST 20SQCM/<: CPT | Performed by: SPECIALIST

## 2024-08-19 RX ORDER — LIDOCAINE 40 MG/G
CREAM TOPICAL ONCE
OUTPATIENT
Start: 2024-08-19 | End: 2024-08-19

## 2024-08-19 RX ORDER — SODIUM CHLOR/HYPOCHLOROUS ACID 0.033 %
SOLUTION, IRRIGATION IRRIGATION ONCE
OUTPATIENT
Start: 2024-08-19 | End: 2024-08-19

## 2024-08-19 RX ORDER — CLOBETASOL PROPIONATE 0.5 MG/G
OINTMENT TOPICAL ONCE
OUTPATIENT
Start: 2024-08-19 | End: 2024-08-19

## 2024-08-19 RX ORDER — LIDOCAINE HYDROCHLORIDE 40 MG/ML
SOLUTION TOPICAL ONCE
OUTPATIENT
Start: 2024-08-19 | End: 2024-08-19

## 2024-08-19 RX ORDER — LIDOCAINE HYDROCHLORIDE 20 MG/ML
JELLY TOPICAL ONCE
OUTPATIENT
Start: 2024-08-19 | End: 2024-08-19

## 2024-08-19 RX ORDER — GINSENG 100 MG
CAPSULE ORAL ONCE
OUTPATIENT
Start: 2024-08-19 | End: 2024-08-19

## 2024-08-19 RX ORDER — IBUPROFEN 200 MG
TABLET ORAL ONCE
OUTPATIENT
Start: 2024-08-19 | End: 2024-08-19

## 2024-08-19 RX ORDER — LIDOCAINE HYDROCHLORIDE 40 MG/ML
SOLUTION TOPICAL ONCE
Status: COMPLETED | OUTPATIENT
Start: 2024-08-19 | End: 2024-08-19

## 2024-08-19 RX ORDER — GENTAMICIN SULFATE 1 MG/G
OINTMENT TOPICAL ONCE
OUTPATIENT
Start: 2024-08-19 | End: 2024-08-19

## 2024-08-19 RX ORDER — LIDOCAINE 50 MG/G
OINTMENT TOPICAL ONCE
OUTPATIENT
Start: 2024-08-19 | End: 2024-08-19

## 2024-08-19 RX ORDER — BACITRACIN ZINC AND POLYMYXIN B SULFATE 500; 1000 [USP'U]/G; [USP'U]/G
OINTMENT TOPICAL ONCE
OUTPATIENT
Start: 2024-08-19 | End: 2024-08-19

## 2024-08-19 RX ORDER — BETAMETHASONE DIPROPIONATE 0.05 %
OINTMENT (GRAM) TOPICAL ONCE
OUTPATIENT
Start: 2024-08-19 | End: 2024-08-19

## 2024-08-19 RX ORDER — TRIAMCINOLONE ACETONIDE 1 MG/G
OINTMENT TOPICAL ONCE
OUTPATIENT
Start: 2024-08-19 | End: 2024-08-19

## 2024-08-19 RX ADMIN — LIDOCAINE HYDROCHLORIDE: 40 SOLUTION TOPICAL at 08:27

## 2024-08-19 ASSESSMENT — PAIN SCALES - GENERAL: PAINLEVEL_OUTOF10: 6

## 2024-08-19 ASSESSMENT — PAIN DESCRIPTION - LOCATION: LOCATION: LEG

## 2024-08-19 ASSESSMENT — PAIN DESCRIPTION - DESCRIPTORS: DESCRIPTORS: BURNING

## 2024-08-19 ASSESSMENT — PAIN DESCRIPTION - ORIENTATION: ORIENTATION: RIGHT

## 2024-08-19 ASSESSMENT — PAIN DESCRIPTION - FREQUENCY: FREQUENCY: CONTINUOUS

## 2024-08-19 NOTE — PROGRESS NOTES
Multilayer Compression Wrap   (Not Unna) Below the Knee    NAME:  Justin Baeza  YOB: 1965  MEDICAL RECORD NUMBER:  [id number]  DATE:  8/19/2024       Removed old Multilayer wrap if present and washed leg with mild soap/water.   Applied moisturizing agent to dry skin as needed.    Applied primary and secondary dressing as ordered    Applied multilayered dressing below the knee to Right lower leg(s)  (4 Layer Compression Wrap ) .    Instructed patient/caregiver not to remove dressing and to keep it clean and dry.    Instructed patient/caregiver on complications to report to provider, such as pain, numbness in toes, heavy drainage, and slippage of dressing.    Instructed patient on purpose of compression dressing and on activity and exercise recommendations.   Applied per   Guidelines    Electronically signed by Mera Coelho RN on 8/19/2024 at 8:54 AM    
            Orders Placed This Encounter   Procedures    Initiate Outpatient Wound Care Protocol       Home Care Company: none    Medically necessary services for evaluation and treatment: []Skilled Nursing (using clean technique) []PT (Eval & Treat) []OT (Eval & Treat) []Social Work []Dietician []Other:      Wound care instructions:  If you smoke we ask that you refrain from smoking. Smoking inhibits wounds from healing.  When taking antibiotics take the entire prescription as ordered. Do not stop taking until medication is all gone unless otherwise instructed.   Exercise as tolerated.   Keep weight off wounds and reposition every 2 hours if applicable.  Do not get wounds wet in the bath or shower unless otherwise instructed by your physician. If your wound is on your foot or leg, you may purchase a cast bag. Please ask at the pharmacy.  Wash hands with soap and water prior to and after every dressing change.    [x]Wash wounds with: No need to wash. Leave dressing in place.    [x]Kiah wound Topical Treatments: Do not apply lotions, creams, or ointments to the skin around the wound bed unless directed as followed:   Apply around the wound: zinc paste         [x]Wound Location: right lower leg wounds  Apply Primary Dressing to wound: polymem ag  Secondary Dressing: Extra absorbant pad   Avoid contact of tape with skin if possible.  When to change Dressing: DO NOT CHANGE      [x] Multilayer Compression Wrap:  Type: Applied on Right lower leg(s)  4 Layer Compression Wrap    Do not get leg(s) with wrap wet.  If wraps become too tight call the center or completely remove the wrap.   Elevate leg(s) above the level of the heart when sitting.  Avoid prolonged standing in one place.   Applied in Clinic on 8/19/2024  The Goal of this therapy is to reduce edema and get into long term compression garments to control venous insufficiency, lymphedema and reduce occurrence of venous ulcers    [x] Edema Control: 30-40mmHG  Apply:

## 2024-08-19 NOTE — PATIENT INSTRUCTIONS
24-48 business hours to return your call.  These hours of operation are subject to change. If you need help with your wounds and cannot wait until we are available, contact your PCP or go to your preferred emergency room.     Call your doctor now or seek immediate medical care if:    You have symptoms of infection, such as:  Increased pain, swelling, warmth, or redness.  Red streaks leading from the area.  Pus draining from the area.  A fever.

## 2024-08-26 ENCOUNTER — HOSPITAL ENCOUNTER (OUTPATIENT)
Dept: WOUND CARE | Age: 59
Discharge: HOME | End: 2024-08-26
Attending: SPECIALIST
Payer: COMMERCIAL

## 2024-08-26 VITALS
TEMPERATURE: 97 F | SYSTOLIC BLOOD PRESSURE: 139 MMHG | HEART RATE: 76 BPM | DIASTOLIC BLOOD PRESSURE: 84 MMHG | RESPIRATION RATE: 16 BRPM

## 2024-08-26 DIAGNOSIS — L97.909 VENOUS ULCER (HCC): ICD-10-CM

## 2024-08-26 DIAGNOSIS — I87.331 IDIOPATHIC CHRONIC VENOUS HYPERTENSION OF RIGHT LOWER EXTREMITY WITH ULCER AND INFLAMMATION (HCC): ICD-10-CM

## 2024-08-26 DIAGNOSIS — I83.009 VENOUS ULCER (HCC): ICD-10-CM

## 2024-08-26 DIAGNOSIS — I89.0 LYMPHEDEMA: ICD-10-CM

## 2024-08-26 DIAGNOSIS — Z91.199 NONCOMPLIANCE: ICD-10-CM

## 2024-08-26 DIAGNOSIS — I89.0 CHRONIC ACQUIRED LYMPHEDEMA: Primary | ICD-10-CM

## 2024-08-26 PROCEDURE — 11042 DBRDMT SUBQ TIS 1ST 20SQCM/<: CPT | Performed by: SPECIALIST

## 2024-08-26 PROCEDURE — 29581 APPL MULTLAYER CMPRN SYS LEG: CPT

## 2024-08-26 PROCEDURE — 11045 DBRDMT SUBQ TISS EACH ADDL: CPT

## 2024-08-26 PROCEDURE — 11045 DBRDMT SUBQ TISS EACH ADDL: CPT | Performed by: SPECIALIST

## 2024-08-26 PROCEDURE — 11042 DBRDMT SUBQ TIS 1ST 20SQCM/<: CPT

## 2024-08-26 RX ORDER — LIDOCAINE HYDROCHLORIDE 20 MG/ML
JELLY TOPICAL ONCE
OUTPATIENT
Start: 2024-08-26 | End: 2024-08-26

## 2024-08-26 RX ORDER — GINSENG 100 MG
CAPSULE ORAL ONCE
OUTPATIENT
Start: 2024-08-26 | End: 2024-08-26

## 2024-08-26 RX ORDER — BETAMETHASONE DIPROPIONATE 0.05 %
OINTMENT (GRAM) TOPICAL ONCE
OUTPATIENT
Start: 2024-08-26 | End: 2024-08-26

## 2024-08-26 RX ORDER — LIDOCAINE 40 MG/G
CREAM TOPICAL ONCE
OUTPATIENT
Start: 2024-08-26 | End: 2024-08-26

## 2024-08-26 RX ORDER — LIDOCAINE 50 MG/G
OINTMENT TOPICAL ONCE
OUTPATIENT
Start: 2024-08-26 | End: 2024-08-26

## 2024-08-26 RX ORDER — MUPIROCIN 20 MG/G
OINTMENT TOPICAL ONCE
OUTPATIENT
Start: 2024-08-26 | End: 2024-08-26

## 2024-08-26 RX ORDER — SILVER SULFADIAZINE 10 MG/G
CREAM TOPICAL ONCE
OUTPATIENT
Start: 2024-08-26 | End: 2024-08-26

## 2024-08-26 RX ORDER — NEOMYCIN/BACITRACIN/POLYMYXINB 3.5-400-5K
OINTMENT (GRAM) TOPICAL ONCE
OUTPATIENT
Start: 2024-08-26 | End: 2024-08-26

## 2024-08-26 RX ORDER — LIDOCAINE 40 MG/G
CREAM TOPICAL ONCE
Status: COMPLETED | OUTPATIENT
Start: 2024-08-26 | End: 2024-08-26

## 2024-08-26 RX ORDER — BACITRACIN ZINC AND POLYMYXIN B SULFATE 500; 1000 [USP'U]/G; [USP'U]/G
OINTMENT TOPICAL ONCE
OUTPATIENT
Start: 2024-08-26 | End: 2024-08-26

## 2024-08-26 RX ORDER — GENTAMICIN SULFATE 1 MG/G
OINTMENT TOPICAL ONCE
OUTPATIENT
Start: 2024-08-26 | End: 2024-08-26

## 2024-08-26 RX ORDER — LIDOCAINE HYDROCHLORIDE 40 MG/ML
SOLUTION TOPICAL ONCE
OUTPATIENT
Start: 2024-08-26 | End: 2024-08-26

## 2024-08-26 RX ORDER — SODIUM CHLOR/HYPOCHLOROUS ACID 0.033 %
SOLUTION, IRRIGATION IRRIGATION ONCE
OUTPATIENT
Start: 2024-08-26 | End: 2024-08-26

## 2024-08-26 RX ORDER — CLOBETASOL PROPIONATE 0.5 MG/G
OINTMENT TOPICAL ONCE
OUTPATIENT
Start: 2024-08-26 | End: 2024-08-26

## 2024-08-26 RX ORDER — TRIAMCINOLONE ACETONIDE 1 MG/G
OINTMENT TOPICAL ONCE
OUTPATIENT
Start: 2024-08-26 | End: 2024-08-26

## 2024-08-26 RX ADMIN — LIDOCAINE: 40 CREAM TOPICAL at 08:08

## 2024-08-26 ASSESSMENT — PAIN SCALES - GENERAL: PAINLEVEL_OUTOF10: 5

## 2024-08-26 ASSESSMENT — PAIN DESCRIPTION - LOCATION: LOCATION: LEG

## 2024-08-26 ASSESSMENT — PAIN DESCRIPTION - DESCRIPTORS: DESCRIPTORS: BURNING

## 2024-08-26 ASSESSMENT — PAIN DESCRIPTION - FREQUENCY: FREQUENCY: CONTINUOUS

## 2024-08-26 ASSESSMENT — PAIN DESCRIPTION - PAIN TYPE: TYPE: CHRONIC PAIN

## 2024-08-26 ASSESSMENT — PAIN DESCRIPTION - ORIENTATION: ORIENTATION: RIGHT

## 2024-08-26 NOTE — PATIENT INSTRUCTIONS
NOT CHANGE      [x] Multilayer Compression Wrap:  Type: Applied on Right lower leg(s)  4 Layer Compression Wrap    Do not get leg(s) with wrap wet.  If wraps become too tight call the center or completely remove the wrap.   Elevate leg(s) above the level of the heart when sitting.  Avoid prolonged standing in one place.   Applied in Clinic on 8/26/2024  The Goal of this therapy is to reduce edema and get into long term compression garments to control venous insufficiency, lymphedema and reduce occurrence of venous ulcers    [x] Edema Control: 30-40mmHG  Apply: Compression Stocking on the Left leg  Apply every morning immediately when getting up. Remove every night before going to bed unless instructed otherwise     Elevate leg(s) above the level of the heart for 30 minutes 4-5 times a day and/or when sitting.  Avoid prolonged standing in one place.    [x] Lymphedema Therapy:  Wear Lymphedema pumps twice a day at settings prescribed by your physician.   Avoid prolonged standing in one place.      Dietary:  Important dietary reminders:  1. Increase Protein intake (i.e. Lean meats, fish, eggs, legumes, and yogurt)  2. No added salt  3. If diabetic, follow a diabetic diet and check glucose prior to meals or as instructed by your physician.    Dietary Supplements(Take twice a day unless instructed otherwise):  [] Papito  [] 30ml ProStat [] Ensure Complete [] Ensure Max/Premier [] Expedite [] Other:    Your nurse  is:  talisha     Electronically signed by Talisha Hawkins RN on 8/26/2024 at 8:43 AM     Wound Care Center Information: Should you experience any significant changes in your wound(s) or have questions about your wound care, please contact the [facility] at [phone].   Hours of operation:  Mon:  8AM - 2PM  Tue: 11AM - 5PM  Wed: CLOSED  Thur: 8AM - 4:30PM  Fri:  8AM - 4:30PM  The office is closed on all major holidays.    Please give us 24-48 business hours to return your call.   These hours of operation are subject to change. If you need help with your wounds and cannot wait until we are available, contact your PCP or go to your preferred emergency room.     Call your doctor now or seek immediate medical care if:    You have symptoms of infection, such as:  Increased pain, swelling, warmth, or redness.  Red streaks leading from the area.  Pus draining from the area.  A fever.

## 2024-08-26 NOTE — PROGRESS NOTES
Multilayer Compression Wrap   (Not Unna) Below the Knee    NAME:  Justin Baeza  YOB: 1965  MEDICAL RECORD NUMBER:  [id number]  DATE:  8/26/2024       Removed old Multilayer wrap if present and washed leg with mild soap/water.   Applied moisturizing agent to dry skin as needed.    Applied primary and secondary dressing as ordered    Applied multilayered dressing below the knee to Right lower leg(s)  (4 Layer Compression Wrap ) .    Instructed patient/caregiver not to remove dressing and to keep it clean and dry.    Instructed patient/caregiver on complications to report to provider, such as pain, numbness in toes, heavy drainage, and slippage of dressing.    Instructed patient on purpose of compression dressing and on activity and exercise recommendations.   Applied per   Guidelines    Electronically signed by Edgar Nunez RN on 8/26/2024 at 10:25 AM

## 2024-08-26 NOTE — PROGRESS NOTES
[facility]  Progress Note and Procedure Note      Justin Baeza  MEDICAL RECORD NUMBER:  [id number]  AGE: 59 y.o.   GENDER: male  : 1965  EPISODE DATE:  2024    Subjective:     Chief Complaint   Patient presents with    Wound Check     Right lower leg         HISTORY of PRESENT ILLNESS HPI     Justin Baeza is a 59 y.o. male who presents today for wound/ulcer evaluation.   History of Wound Context: Patient continues follow-up for chronic venous insufficiency with ulceration and lymphedema right lower extremity.  He has now been off his antibiotics 1 week.  There has been minimal drainage this past week.  Wound/Ulcer Pain Timing/Severity: none  Quality of pain: N/A  Severity:  0 / 10   Modifying Factors: None  Associated Signs/Symptoms: edema and drainage    Ulcer Identification:  Ulcer Type: venous    Contributing Factors: edema, venous stasis, lymphedema, obesity, and anticoagulation therapy    Acute Wound: N/A not an acute wound    PAST MEDICAL HISTORY        Diagnosis Date    DVT (deep venous thrombosis) (HCC)     Hx of blood clots     Pain and swelling of right lower leg        PAST SURGICAL HISTORY    Past Surgical History:   Procedure Laterality Date    BALLOON ANGIOPLASTY, ARTERY Right 2017    at [facility]    DILATATION, ESOPHAGUS      SKIN SPLIT GRAFT Right        FAMILY HISTORY    Family History   Problem Relation Age of Onset    Diabetes Mother     Heart Attack Father        SOCIAL HISTORY    Social History     Tobacco Use    Smoking status: Never    Smokeless tobacco: Never   Vaping Use    Vaping status: Never Used   Substance Use Topics    Alcohol use: No    Drug use: No       ALLERGIES    No Known Allergies    MEDICATIONS    Current Outpatient Medications on File Prior to Encounter   Medication Sig Dispense Refill    ibuprofen (ADVIL;MOTRIN) 200 MG tablet Take 1 tablet by mouth every 6 hours as needed for Pain      warfarin (COUMADIN) 5 MG tablet Take 7.5  inhibits wounds from healing.  When taking antibiotics take the entire prescription as ordered. Do not stop taking until medication is all gone unless otherwise instructed.   Exercise as tolerated.   Keep weight off wounds and reposition every 2 hours if applicable.  Do not get wounds wet in the bath or shower unless otherwise instructed by your physician. If your wound is on your foot or leg, you may purchase a cast bag. Please ask at the pharmacy.  Wash hands with soap and water prior to and after every dressing change.    [x]Wash wounds with: No need to wash. Leave dressing in place.    [x]Kiah wound Topical Treatments: Do not apply lotions, creams, or ointments to the skin around the wound bed unless directed as followed:   Apply around the wound: zinc paste         [x]Wound Location: right lower leg wounds  Apply Primary Dressing to wound: polymem ag  Secondary Dressing: Extra absorbant pad   Avoid contact of tape with skin if possible.  When to change Dressing: DO NOT CHANGE      [x] Multilayer Compression Wrap:  Type: Applied on Right lower leg(s)  4 Layer Compression Wrap    Do not get leg(s) with wrap wet.  If wraps become too tight call the center or completely remove the wrap.   Elevate leg(s) above the level of the heart when sitting.  Avoid prolonged standing in one place.   Applied in Clinic on 8/26/2024  The Goal of this therapy is to reduce edema and get into long term compression garments to control venous insufficiency, lymphedema and reduce occurrence of venous ulcers    [x] Edema Control: 30-40mmHG  Apply: Compression Stocking on the Left leg  Apply every morning immediately when getting up. Remove every night before going to bed unless instructed otherwise     Elevate leg(s) above the level of the heart for 30 minutes 4-5 times a day and/or when sitting.  Avoid prolonged standing in one place.    [x] Lymphedema Therapy:  Wear Lymphedema pumps twice a day at settings prescribed by your physician.

## 2024-09-03 ENCOUNTER — HOSPITAL ENCOUNTER (OUTPATIENT)
Dept: WOUND CARE | Age: 59
Discharge: HOME | End: 2024-09-03
Attending: SPECIALIST
Payer: COMMERCIAL

## 2024-09-03 VITALS
HEART RATE: 79 BPM | DIASTOLIC BLOOD PRESSURE: 73 MMHG | TEMPERATURE: 96 F | RESPIRATION RATE: 16 BRPM | SYSTOLIC BLOOD PRESSURE: 149 MMHG

## 2024-09-03 DIAGNOSIS — I89.0 LYMPHEDEMA: ICD-10-CM

## 2024-09-03 DIAGNOSIS — I89.0 CHRONIC ACQUIRED LYMPHEDEMA: Primary | ICD-10-CM

## 2024-09-03 DIAGNOSIS — I83.009 VENOUS ULCER (HCC): ICD-10-CM

## 2024-09-03 DIAGNOSIS — L97.909 VENOUS ULCER (HCC): ICD-10-CM

## 2024-09-03 DIAGNOSIS — I87.331 IDIOPATHIC CHRONIC VENOUS HYPERTENSION OF RIGHT LOWER EXTREMITY WITH ULCER AND INFLAMMATION (HCC): ICD-10-CM

## 2024-09-03 DIAGNOSIS — Z91.199 NONCOMPLIANCE: ICD-10-CM

## 2024-09-03 PROCEDURE — 11042 DBRDMT SUBQ TIS 1ST 20SQCM/<: CPT

## 2024-09-03 PROCEDURE — 29581 APPL MULTLAYER CMPRN SYS LEG: CPT

## 2024-09-03 PROCEDURE — 11045 DBRDMT SUBQ TISS EACH ADDL: CPT

## 2024-09-03 PROCEDURE — 11042 DBRDMT SUBQ TIS 1ST 20SQCM/<: CPT | Performed by: SPECIALIST

## 2024-09-03 PROCEDURE — 11045 DBRDMT SUBQ TISS EACH ADDL: CPT | Performed by: SPECIALIST

## 2024-09-03 PROCEDURE — 6370000000 HC RX 637 (ALT 250 FOR IP): Performed by: SPECIALIST

## 2024-09-03 RX ORDER — LIDOCAINE 50 MG/G
OINTMENT TOPICAL ONCE
OUTPATIENT
Start: 2024-09-03 | End: 2024-09-03

## 2024-09-03 RX ORDER — CLOBETASOL PROPIONATE 0.5 MG/G
OINTMENT TOPICAL ONCE
OUTPATIENT
Start: 2024-09-03 | End: 2024-09-03

## 2024-09-03 RX ORDER — GENTAMICIN SULFATE 1 MG/G
OINTMENT TOPICAL ONCE
OUTPATIENT
Start: 2024-09-03 | End: 2024-09-03

## 2024-09-03 RX ORDER — GINSENG 100 MG
CAPSULE ORAL ONCE
OUTPATIENT
Start: 2024-09-03 | End: 2024-09-03

## 2024-09-03 RX ORDER — LIDOCAINE HYDROCHLORIDE 40 MG/ML
SOLUTION TOPICAL ONCE
Status: COMPLETED | OUTPATIENT
Start: 2024-09-03 | End: 2024-09-03

## 2024-09-03 RX ORDER — SODIUM CHLOR/HYPOCHLOROUS ACID 0.033 %
SOLUTION, IRRIGATION IRRIGATION ONCE
OUTPATIENT
Start: 2024-09-03 | End: 2024-09-03

## 2024-09-03 RX ORDER — BETAMETHASONE DIPROPIONATE 0.05 %
OINTMENT (GRAM) TOPICAL ONCE
OUTPATIENT
Start: 2024-09-03 | End: 2024-09-03

## 2024-09-03 RX ORDER — MUPIROCIN 20 MG/G
OINTMENT TOPICAL ONCE
OUTPATIENT
Start: 2024-09-03 | End: 2024-09-03

## 2024-09-03 RX ORDER — BACITRACIN ZINC AND POLYMYXIN B SULFATE 500; 1000 [USP'U]/G; [USP'U]/G
OINTMENT TOPICAL ONCE
OUTPATIENT
Start: 2024-09-03 | End: 2024-09-03

## 2024-09-03 RX ORDER — SILVER SULFADIAZINE 10 MG/G
CREAM TOPICAL ONCE
OUTPATIENT
Start: 2024-09-03 | End: 2024-09-03

## 2024-09-03 RX ORDER — LIDOCAINE HYDROCHLORIDE 20 MG/ML
JELLY TOPICAL ONCE
OUTPATIENT
Start: 2024-09-03 | End: 2024-09-03

## 2024-09-03 RX ORDER — LIDOCAINE 40 MG/G
CREAM TOPICAL ONCE
OUTPATIENT
Start: 2024-09-03 | End: 2024-09-03

## 2024-09-03 RX ORDER — TRIAMCINOLONE ACETONIDE 1 MG/G
OINTMENT TOPICAL ONCE
OUTPATIENT
Start: 2024-09-03 | End: 2024-09-03

## 2024-09-03 RX ORDER — NEOMYCIN/BACITRACIN/POLYMYXINB 3.5-400-5K
OINTMENT (GRAM) TOPICAL ONCE
OUTPATIENT
Start: 2024-09-03 | End: 2024-09-03

## 2024-09-03 RX ORDER — LIDOCAINE HYDROCHLORIDE 40 MG/ML
SOLUTION TOPICAL ONCE
OUTPATIENT
Start: 2024-09-03 | End: 2024-09-03

## 2024-09-03 RX ADMIN — LIDOCAINE HYDROCHLORIDE: 40 SOLUTION TOPICAL at 11:31

## 2024-09-03 ASSESSMENT — PAIN DESCRIPTION - FREQUENCY: FREQUENCY: CONTINUOUS

## 2024-09-03 ASSESSMENT — PAIN DESCRIPTION - ORIENTATION: ORIENTATION: RIGHT

## 2024-09-03 ASSESSMENT — PAIN SCALES - GENERAL: PAINLEVEL_OUTOF10: 7

## 2024-09-03 ASSESSMENT — PAIN DESCRIPTION - PAIN TYPE: TYPE: CHRONIC PAIN

## 2024-09-03 ASSESSMENT — PAIN DESCRIPTION - LOCATION: LOCATION: LEG

## 2024-09-03 ASSESSMENT — PAIN DESCRIPTION - DESCRIPTORS: DESCRIPTORS: BURNING

## 2024-09-03 NOTE — PROGRESS NOTES
Multilayer Compression Wrap   (Not Unna) Below the Knee    NAME:  Justin Baeza  YOB: 1965  MEDICAL RECORD NUMBER:  [id number]  DATE:  9/3/2024       Removed old Multilayer wrap if present and washed leg with mild soap/water.   Applied moisturizing agent to dry skin as needed.    Applied primary and secondary dressing as ordered    Applied multilayered dressing below the knee to Right lower leg(s)  (4 Layer Compression Wrap ) .    Instructed patient/caregiver not to remove dressing and to keep it clean and dry.    Instructed patient/caregiver on complications to report to provider, such as pain, numbness in toes, heavy drainage, and slippage of dressing.    Instructed patient on purpose of compression dressing and on activity and exercise recommendations.   Applied per   Guidelines    Electronically signed by Kathy Gomez RN on 9/3/2024 at 11:54 AM

## 2024-09-03 NOTE — PATIENT INSTRUCTIONS
NOT CHANGE      [x] Multilayer Compression Wrap:  Type: Applied on Right lower leg(s)  4 Layer Compression Wrap    Do not get leg(s) with wrap wet.  If wraps become too tight call the center or completely remove the wrap.   Elevate leg(s) above the level of the heart when sitting.  Avoid prolonged standing in one place.   Applied in Clinic on 9/3/2024  The Goal of this therapy is to reduce edema and get into long term compression garments to control venous insufficiency, lymphedema and reduce occurrence of venous ulcers    [x] Edema Control: 30-40mmHG  Apply: Compression Stocking on the Left leg  Apply every morning immediately when getting up. Remove every night before going to bed unless instructed otherwise     Elevate leg(s) above the level of the heart for 30 minutes 4-5 times a day and/or when sitting.  Avoid prolonged standing in one place.    [x] Lymphedema Therapy:  Wear Lymphedema pumps twice a day at settings prescribed by your physician.   Avoid prolonged standing in one place.      Dietary:  Important dietary reminders:  1. Increase Protein intake (i.e. Lean meats, fish, eggs, legumes, and yogurt)  2. No added salt  3. If diabetic, follow a diabetic diet and check glucose prior to meals or as instructed by your physician.    Dietary Supplements(Take twice a day unless instructed otherwise):  [] Papito  [] 30ml ProStat [] Ensure Complete [] Ensure Max/Premier [] Expedite [] Other:    Your nurse  is:  talisha     Electronically signed by Talisha Hawkins RN on 9/3/2024 at 11:47 AM     Wound Care Center Information: Should you experience any significant changes in your wound(s) or have questions about your wound care, please contact the [facility] at [phone].   Hours of operation:  Mon:  8AM - 2PM  Tue: 11AM - 5PM  Wed: CLOSED  Thur: 8AM - 4:30PM  Fri:  8AM - 4:30PM  The office is closed on all major holidays.    Please give us 24-48 business hours to return your call.

## 2024-09-05 NOTE — PROGRESS NOTES
[facility]  Progress Note and Procedure Note      Justin Baeza  MEDICAL RECORD NUMBER:  [id number]  AGE: 59 y.o.   GENDER: male  : 1965  EPISODE DATE:  9/3/2024    Subjective:     Chief Complaint   Patient presents with    Wound Check     Right lower leg         HISTORY of PRESENT ILLNESS HPI     Justin Baeza is a 59 y.o. male who presents today for wound/ulcer evaluation.   History of Wound Context: Patient continues follow-up for chronic venous insufficiency with ulceration and lymphedema right lower extremity. He has now been off his antibiotics   Wound/Ulcer Pain Timing/Severity: none  Quality of pain: N/A  Severity:  0 / 10   Modifying Factors: None  Associated Signs/Symptoms: edema and drainage    Ulcer Identification:  Ulcer Type: venous    Contributing Factors: edema, venous stasis, diabetes, obesity, and anticoagulation therapy    Acute Wound: N/A not an acute wound    PAST MEDICAL HISTORY        Diagnosis Date    DVT (deep venous thrombosis) (HCC)     Hx of blood clots     Pain and swelling of right lower leg        PAST SURGICAL HISTORY    Past Surgical History:   Procedure Laterality Date    BALLOON ANGIOPLASTY, ARTERY Right 2017    at [facility]    DILATATION, ESOPHAGUS      SKIN SPLIT GRAFT Right        FAMILY HISTORY    Family History   Problem Relation Age of Onset    Diabetes Mother     Heart Attack Father        SOCIAL HISTORY    Social History     Tobacco Use    Smoking status: Never    Smokeless tobacco: Never   Vaping Use    Vaping status: Never Used   Substance Use Topics    Alcohol use: No    Drug use: No       ALLERGIES    No Known Allergies    MEDICATIONS    Current Outpatient Medications on File Prior to Encounter   Medication Sig Dispense Refill    warfarin (COUMADIN) 5 MG tablet Take 7.5 mg (1.5 tablets) every day except take 5 mg (1 tablet) on  and  or as directed by the anticoagulation clinic (Patient taking differently: Take 1

## 2024-09-10 ENCOUNTER — HOSPITAL ENCOUNTER (OUTPATIENT)
Dept: WOUND CARE | Age: 59
Discharge: HOME | End: 2024-09-10
Attending: SPECIALIST
Payer: COMMERCIAL

## 2024-09-10 VITALS
RESPIRATION RATE: 18 BRPM | HEIGHT: 67 IN | TEMPERATURE: 97.5 F | SYSTOLIC BLOOD PRESSURE: 137 MMHG | HEART RATE: 66 BPM | DIASTOLIC BLOOD PRESSURE: 82 MMHG | BODY MASS INDEX: 40.73 KG/M2

## 2024-09-10 DIAGNOSIS — I83.009 VENOUS ULCER (HCC): ICD-10-CM

## 2024-09-10 DIAGNOSIS — I89.0 LYMPHEDEMA: ICD-10-CM

## 2024-09-10 DIAGNOSIS — I87.331 IDIOPATHIC CHRONIC VENOUS HYPERTENSION OF RIGHT LOWER EXTREMITY WITH ULCER AND INFLAMMATION (HCC): ICD-10-CM

## 2024-09-10 DIAGNOSIS — I89.0 CHRONIC ACQUIRED LYMPHEDEMA: Primary | ICD-10-CM

## 2024-09-10 DIAGNOSIS — Z91.199 NONCOMPLIANCE: ICD-10-CM

## 2024-09-10 DIAGNOSIS — L97.909 VENOUS ULCER (HCC): ICD-10-CM

## 2024-09-10 PROCEDURE — 11042 DBRDMT SUBQ TIS 1ST 20SQCM/<: CPT

## 2024-09-10 PROCEDURE — 29581 APPL MULTLAYER CMPRN SYS LEG: CPT

## 2024-09-10 PROCEDURE — 6370000000 HC RX 637 (ALT 250 FOR IP): Performed by: SPECIALIST

## 2024-09-10 PROCEDURE — 11045 DBRDMT SUBQ TISS EACH ADDL: CPT

## 2024-09-10 RX ORDER — LIDOCAINE HYDROCHLORIDE 40 MG/ML
SOLUTION TOPICAL ONCE
OUTPATIENT
Start: 2024-09-10 | End: 2024-09-10

## 2024-09-10 RX ORDER — LIDOCAINE 40 MG/G
CREAM TOPICAL ONCE
OUTPATIENT
Start: 2024-09-10 | End: 2024-09-10

## 2024-09-10 RX ORDER — LIDOCAINE 50 MG/G
OINTMENT TOPICAL ONCE
OUTPATIENT
Start: 2024-09-10 | End: 2024-09-10

## 2024-09-10 RX ORDER — TRIAMCINOLONE ACETONIDE 1 MG/G
OINTMENT TOPICAL ONCE
OUTPATIENT
Start: 2024-09-10 | End: 2024-09-10

## 2024-09-10 RX ORDER — GENTAMICIN SULFATE 1 MG/G
OINTMENT TOPICAL ONCE
OUTPATIENT
Start: 2024-09-10 | End: 2024-09-10

## 2024-09-10 RX ORDER — LIDOCAINE HYDROCHLORIDE 40 MG/ML
SOLUTION TOPICAL ONCE
Status: COMPLETED | OUTPATIENT
Start: 2024-09-10 | End: 2024-09-10

## 2024-09-10 RX ORDER — BACITRACIN ZINC AND POLYMYXIN B SULFATE 500; 1000 [USP'U]/G; [USP'U]/G
OINTMENT TOPICAL ONCE
OUTPATIENT
Start: 2024-09-10 | End: 2024-09-10

## 2024-09-10 RX ORDER — LIDOCAINE HYDROCHLORIDE 20 MG/ML
JELLY TOPICAL ONCE
OUTPATIENT
Start: 2024-09-10 | End: 2024-09-10

## 2024-09-10 RX ORDER — CLOBETASOL PROPIONATE 0.5 MG/G
OINTMENT TOPICAL ONCE
OUTPATIENT
Start: 2024-09-10 | End: 2024-09-10

## 2024-09-10 RX ORDER — BETAMETHASONE DIPROPIONATE 0.05 %
OINTMENT (GRAM) TOPICAL ONCE
OUTPATIENT
Start: 2024-09-10 | End: 2024-09-10

## 2024-09-10 RX ORDER — GINSENG 100 MG
CAPSULE ORAL ONCE
OUTPATIENT
Start: 2024-09-10 | End: 2024-09-10

## 2024-09-10 RX ORDER — MUPIROCIN 20 MG/G
OINTMENT TOPICAL ONCE
OUTPATIENT
Start: 2024-09-10 | End: 2024-09-10

## 2024-09-10 RX ORDER — SODIUM CHLOR/HYPOCHLOROUS ACID 0.033 %
SOLUTION, IRRIGATION IRRIGATION ONCE
OUTPATIENT
Start: 2024-09-10 | End: 2024-09-10

## 2024-09-10 RX ORDER — NEOMYCIN/BACITRACIN/POLYMYXINB 3.5-400-5K
OINTMENT (GRAM) TOPICAL ONCE
OUTPATIENT
Start: 2024-09-10 | End: 2024-09-10

## 2024-09-10 RX ORDER — SILVER SULFADIAZINE 10 MG/G
CREAM TOPICAL ONCE
OUTPATIENT
Start: 2024-09-10 | End: 2024-09-10

## 2024-09-10 RX ADMIN — LIDOCAINE HYDROCHLORIDE: 40 SOLUTION TOPICAL at 11:37

## 2024-09-10 ASSESSMENT — PAIN SCALES - GENERAL: PAINLEVEL_OUTOF10: 5

## 2024-09-16 ENCOUNTER — HOSPITAL ENCOUNTER (OUTPATIENT)
Dept: WOUND CARE | Age: 59
Discharge: HOME | End: 2024-09-16
Attending: SPECIALIST
Payer: COMMERCIAL

## 2024-09-16 VITALS
DIASTOLIC BLOOD PRESSURE: 76 MMHG | HEART RATE: 68 BPM | SYSTOLIC BLOOD PRESSURE: 129 MMHG | TEMPERATURE: 97.1 F | RESPIRATION RATE: 18 BRPM

## 2024-09-16 DIAGNOSIS — L97.909 VENOUS ULCER (HCC): ICD-10-CM

## 2024-09-16 DIAGNOSIS — I89.0 LYMPHEDEMA: ICD-10-CM

## 2024-09-16 DIAGNOSIS — I83.009 VENOUS ULCER (HCC): ICD-10-CM

## 2024-09-16 DIAGNOSIS — I89.0 CHRONIC ACQUIRED LYMPHEDEMA: Primary | ICD-10-CM

## 2024-09-16 DIAGNOSIS — I87.331 IDIOPATHIC CHRONIC VENOUS HYPERTENSION OF RIGHT LOWER EXTREMITY WITH ULCER AND INFLAMMATION (HCC): ICD-10-CM

## 2024-09-16 DIAGNOSIS — Z91.199 NONCOMPLIANCE: ICD-10-CM

## 2024-09-16 PROCEDURE — 11042 DBRDMT SUBQ TIS 1ST 20SQCM/<: CPT | Performed by: SPECIALIST

## 2024-09-16 PROCEDURE — 11045 DBRDMT SUBQ TISS EACH ADDL: CPT | Performed by: SPECIALIST

## 2024-09-16 PROCEDURE — 11042 DBRDMT SUBQ TIS 1ST 20SQCM/<: CPT

## 2024-09-16 PROCEDURE — 11045 DBRDMT SUBQ TISS EACH ADDL: CPT

## 2024-09-16 PROCEDURE — 6370000000 HC RX 637 (ALT 250 FOR IP): Performed by: SPECIALIST

## 2024-09-16 PROCEDURE — 29581 APPL MULTLAYER CMPRN SYS LEG: CPT

## 2024-09-16 RX ORDER — LIDOCAINE HYDROCHLORIDE 20 MG/ML
JELLY TOPICAL ONCE
OUTPATIENT
Start: 2024-09-16 | End: 2024-09-16

## 2024-09-16 RX ORDER — GENTAMICIN SULFATE 1 MG/G
OINTMENT TOPICAL ONCE
Status: COMPLETED | OUTPATIENT
Start: 2024-09-16 | End: 2024-09-16

## 2024-09-16 RX ORDER — NEOMYCIN/BACITRACIN/POLYMYXINB 3.5-400-5K
OINTMENT (GRAM) TOPICAL ONCE
OUTPATIENT
Start: 2024-09-16 | End: 2024-09-16

## 2024-09-16 RX ORDER — TRIAMCINOLONE ACETONIDE 1 MG/G
OINTMENT TOPICAL ONCE
OUTPATIENT
Start: 2024-09-16 | End: 2024-09-16

## 2024-09-16 RX ORDER — SODIUM CHLOR/HYPOCHLOROUS ACID 0.033 %
SOLUTION, IRRIGATION IRRIGATION ONCE
OUTPATIENT
Start: 2024-09-16 | End: 2024-09-16

## 2024-09-16 RX ORDER — LIDOCAINE HYDROCHLORIDE 40 MG/ML
SOLUTION TOPICAL ONCE
Status: COMPLETED | OUTPATIENT
Start: 2024-09-16 | End: 2024-09-16

## 2024-09-16 RX ORDER — SILVER SULFADIAZINE 10 MG/G
CREAM TOPICAL ONCE
OUTPATIENT
Start: 2024-09-16 | End: 2024-09-16

## 2024-09-16 RX ORDER — MUPIROCIN 20 MG/G
OINTMENT TOPICAL ONCE
OUTPATIENT
Start: 2024-09-16 | End: 2024-09-16

## 2024-09-16 RX ORDER — LIDOCAINE 50 MG/G
OINTMENT TOPICAL ONCE
OUTPATIENT
Start: 2024-09-16 | End: 2024-09-16

## 2024-09-16 RX ORDER — LEVOFLOXACIN 500 MG/1
500 TABLET, FILM COATED ORAL DAILY
Qty: 10 TABLET | Refills: 0 | Status: SHIPPED | OUTPATIENT
Start: 2024-09-16 | End: 2024-09-26

## 2024-09-16 RX ORDER — CLOBETASOL PROPIONATE 0.5 MG/G
OINTMENT TOPICAL ONCE
OUTPATIENT
Start: 2024-09-16 | End: 2024-09-16

## 2024-09-16 RX ORDER — LIDOCAINE 40 MG/G
CREAM TOPICAL ONCE
OUTPATIENT
Start: 2024-09-16 | End: 2024-09-16

## 2024-09-16 RX ORDER — BACITRACIN ZINC AND POLYMYXIN B SULFATE 500; 1000 [USP'U]/G; [USP'U]/G
OINTMENT TOPICAL ONCE
OUTPATIENT
Start: 2024-09-16 | End: 2024-09-16

## 2024-09-16 RX ORDER — GENTAMICIN SULFATE 1 MG/G
OINTMENT TOPICAL ONCE
OUTPATIENT
Start: 2024-09-16 | End: 2024-09-16

## 2024-09-16 RX ORDER — LIDOCAINE HYDROCHLORIDE 40 MG/ML
SOLUTION TOPICAL ONCE
OUTPATIENT
Start: 2024-09-16 | End: 2024-09-16

## 2024-09-16 RX ORDER — BETAMETHASONE DIPROPIONATE 0.05 %
OINTMENT (GRAM) TOPICAL ONCE
OUTPATIENT
Start: 2024-09-16 | End: 2024-09-16

## 2024-09-16 RX ORDER — GINSENG 100 MG
CAPSULE ORAL ONCE
OUTPATIENT
Start: 2024-09-16 | End: 2024-09-16

## 2024-09-16 RX ADMIN — LIDOCAINE HYDROCHLORIDE: 40 SOLUTION TOPICAL at 08:59

## 2024-09-16 RX ADMIN — GENTAMICIN SULFATE: 1 OINTMENT TOPICAL at 08:58

## 2024-09-16 ASSESSMENT — PAIN DESCRIPTION - PAIN TYPE: TYPE: CHRONIC PAIN

## 2024-09-16 ASSESSMENT — PAIN SCALES - GENERAL
PAINLEVEL_OUTOF10: 6
PAINLEVEL_OUTOF10: 0

## 2024-09-16 ASSESSMENT — PAIN DESCRIPTION - ORIENTATION: ORIENTATION: RIGHT

## 2024-09-16 ASSESSMENT — PAIN DESCRIPTION - LOCATION: LOCATION: LEG

## 2024-09-20 ENCOUNTER — HOSPITAL ENCOUNTER (OUTPATIENT)
Dept: WOUND CARE | Age: 59
Discharge: HOME | End: 2024-09-20
Attending: SPECIALIST
Payer: COMMERCIAL

## 2024-09-20 PROCEDURE — 29581 APPL MULTLAYER CMPRN SYS LEG: CPT

## 2024-09-20 ASSESSMENT — PAIN DESCRIPTION - PAIN TYPE: TYPE: CHRONIC PAIN

## 2024-09-20 ASSESSMENT — PAIN DESCRIPTION - LOCATION: LOCATION: LEG

## 2024-09-20 ASSESSMENT — PAIN DESCRIPTION - ORIENTATION: ORIENTATION: RIGHT

## 2024-09-20 ASSESSMENT — PAIN DESCRIPTION - DESCRIPTORS: DESCRIPTORS: ACHING;BURNING

## 2024-09-20 ASSESSMENT — PAIN SCALES - GENERAL: PAINLEVEL_OUTOF10: 6

## 2024-09-23 ENCOUNTER — HOSPITAL ENCOUNTER (OUTPATIENT)
Dept: WOUND CARE | Age: 59
Discharge: HOME | End: 2024-09-23
Attending: SPECIALIST
Payer: COMMERCIAL

## 2024-09-23 VITALS
SYSTOLIC BLOOD PRESSURE: 127 MMHG | DIASTOLIC BLOOD PRESSURE: 79 MMHG | HEART RATE: 80 BPM | TEMPERATURE: 97.3 F | RESPIRATION RATE: 18 BRPM

## 2024-09-23 DIAGNOSIS — I87.331 IDIOPATHIC CHRONIC VENOUS HYPERTENSION OF RIGHT LOWER EXTREMITY WITH ULCER AND INFLAMMATION (HCC): ICD-10-CM

## 2024-09-23 DIAGNOSIS — L97.909 VENOUS ULCER (HCC): ICD-10-CM

## 2024-09-23 DIAGNOSIS — I83.009 VENOUS ULCER (HCC): ICD-10-CM

## 2024-09-23 DIAGNOSIS — Z91.199 NONCOMPLIANCE: ICD-10-CM

## 2024-09-23 DIAGNOSIS — I89.0 CHRONIC ACQUIRED LYMPHEDEMA: Primary | ICD-10-CM

## 2024-09-23 DIAGNOSIS — I89.0 LYMPHEDEMA: ICD-10-CM

## 2024-09-23 PROCEDURE — 29581 APPL MULTLAYER CMPRN SYS LEG: CPT

## 2024-09-23 PROCEDURE — 11045 DBRDMT SUBQ TISS EACH ADDL: CPT

## 2024-09-23 PROCEDURE — 11042 DBRDMT SUBQ TIS 1ST 20SQCM/<: CPT

## 2024-09-23 PROCEDURE — 6370000000 HC RX 637 (ALT 250 FOR IP): Performed by: SPECIALIST

## 2024-09-23 RX ORDER — BACITRACIN ZINC AND POLYMYXIN B SULFATE 500; 1000 [USP'U]/G; [USP'U]/G
OINTMENT TOPICAL ONCE
OUTPATIENT
Start: 2024-09-23 | End: 2024-09-23

## 2024-09-23 RX ORDER — GINSENG 100 MG
CAPSULE ORAL ONCE
OUTPATIENT
Start: 2024-09-23 | End: 2024-09-23

## 2024-09-23 RX ORDER — CLOBETASOL PROPIONATE 0.5 MG/G
OINTMENT TOPICAL ONCE
OUTPATIENT
Start: 2024-09-23 | End: 2024-09-23

## 2024-09-23 RX ORDER — TRIAMCINOLONE ACETONIDE 1 MG/G
OINTMENT TOPICAL ONCE
OUTPATIENT
Start: 2024-09-23 | End: 2024-09-23

## 2024-09-23 RX ORDER — SODIUM CHLOR/HYPOCHLOROUS ACID 0.033 %
SOLUTION, IRRIGATION IRRIGATION ONCE
OUTPATIENT
Start: 2024-09-23 | End: 2024-09-23

## 2024-09-23 RX ORDER — SODIUM CHLOR/HYPOCHLOROUS ACID 0.033 %
SOLUTION, IRRIGATION IRRIGATION ONCE
Status: COMPLETED | OUTPATIENT
Start: 2024-09-23 | End: 2024-09-23

## 2024-09-23 RX ORDER — SILVER SULFADIAZINE 10 MG/G
CREAM TOPICAL ONCE
OUTPATIENT
Start: 2024-09-23 | End: 2024-09-23

## 2024-09-23 RX ORDER — LIDOCAINE HYDROCHLORIDE 40 MG/ML
SOLUTION TOPICAL ONCE
OUTPATIENT
Start: 2024-09-23 | End: 2024-09-23

## 2024-09-23 RX ORDER — BETAMETHASONE DIPROPIONATE 0.05 %
OINTMENT (GRAM) TOPICAL ONCE
OUTPATIENT
Start: 2024-09-23 | End: 2024-09-23

## 2024-09-23 RX ORDER — NEOMYCIN/BACITRACIN/POLYMYXINB 3.5-400-5K
OINTMENT (GRAM) TOPICAL ONCE
OUTPATIENT
Start: 2024-09-23 | End: 2024-09-23

## 2024-09-23 RX ORDER — LIDOCAINE 50 MG/G
OINTMENT TOPICAL ONCE
OUTPATIENT
Start: 2024-09-23 | End: 2024-09-23

## 2024-09-23 RX ORDER — MUPIROCIN 20 MG/G
OINTMENT TOPICAL ONCE
OUTPATIENT
Start: 2024-09-23 | End: 2024-09-23

## 2024-09-23 RX ORDER — LIDOCAINE HYDROCHLORIDE 20 MG/ML
JELLY TOPICAL ONCE
OUTPATIENT
Start: 2024-09-23 | End: 2024-09-23

## 2024-09-23 RX ORDER — GENTAMICIN SULFATE 1 MG/G
OINTMENT TOPICAL ONCE
OUTPATIENT
Start: 2024-09-23 | End: 2024-09-23

## 2024-09-23 RX ORDER — LIDOCAINE 40 MG/G
CREAM TOPICAL ONCE
Status: COMPLETED | OUTPATIENT
Start: 2024-09-23 | End: 2024-09-23

## 2024-09-23 RX ORDER — LIDOCAINE 40 MG/G
CREAM TOPICAL ONCE
OUTPATIENT
Start: 2024-09-23 | End: 2024-09-23

## 2024-09-23 RX ADMIN — LIDOCAINE: 40 CREAM TOPICAL at 08:23

## 2024-09-23 RX ADMIN — Medication: at 09:10

## 2024-09-23 ASSESSMENT — PAIN DESCRIPTION - DESCRIPTORS: DESCRIPTORS: ACHING;BURNING

## 2024-09-23 ASSESSMENT — PAIN DESCRIPTION - LOCATION: LOCATION: LEG

## 2024-09-23 ASSESSMENT — PAIN SCALES - GENERAL: PAINLEVEL_OUTOF10: 6

## 2024-09-23 ASSESSMENT — PAIN DESCRIPTION - PAIN TYPE: TYPE: CHRONIC PAIN

## 2024-09-23 ASSESSMENT — PAIN DESCRIPTION - ORIENTATION: ORIENTATION: RIGHT

## 2024-09-25 ENCOUNTER — TELEPHONE (OUTPATIENT)
Dept: PHARMACY | Age: 59
End: 2024-09-25

## 2024-09-26 NOTE — TELEPHONE ENCOUNTER
Offered to coordinate appt with wound care checks, but patient states he currently has flu like symptoms and would like to wait to schedule until he feels better.     Yana Katz, PharmD, [facility]  [facility]  [place]: [phone]  [place]: [phone]  9/26/2024 3:27 PM

## 2024-09-27 ENCOUNTER — HOSPITAL ENCOUNTER (OUTPATIENT)
Dept: WOUND CARE | Age: 59
Discharge: HOME | End: 2024-09-27
Attending: SPECIALIST
Payer: COMMERCIAL

## 2024-09-27 ENCOUNTER — TELEPHONE (OUTPATIENT)
Dept: INFECTIOUS DISEASES | Age: 59
End: 2024-09-27

## 2024-09-27 PROCEDURE — 29581 APPL MULTLAYER CMPRN SYS LEG: CPT

## 2024-09-30 ENCOUNTER — TELEPHONE (OUTPATIENT)
Dept: INFECTIOUS DISEASES | Age: 59
End: 2024-09-30

## 2024-09-30 ENCOUNTER — HOSPITAL ENCOUNTER (OUTPATIENT)
Dept: WOUND CARE | Age: 59
Discharge: HOME | End: 2024-09-30
Attending: SPECIALIST
Payer: COMMERCIAL

## 2024-09-30 VITALS
RESPIRATION RATE: 18 BRPM | HEART RATE: 84 BPM | SYSTOLIC BLOOD PRESSURE: 130 MMHG | DIASTOLIC BLOOD PRESSURE: 72 MMHG | TEMPERATURE: 97.3 F

## 2024-09-30 DIAGNOSIS — I87.331 IDIOPATHIC CHRONIC VENOUS HYPERTENSION OF RIGHT LOWER EXTREMITY WITH ULCER AND INFLAMMATION (HCC): ICD-10-CM

## 2024-09-30 DIAGNOSIS — Z91.199 NONCOMPLIANCE: ICD-10-CM

## 2024-09-30 DIAGNOSIS — I89.0 LYMPHEDEMA: ICD-10-CM

## 2024-09-30 DIAGNOSIS — I83.009 VENOUS ULCER (HCC): ICD-10-CM

## 2024-09-30 DIAGNOSIS — L97.909 VENOUS ULCER (HCC): ICD-10-CM

## 2024-09-30 DIAGNOSIS — I89.0 CHRONIC ACQUIRED LYMPHEDEMA: Primary | ICD-10-CM

## 2024-09-30 PROCEDURE — 29581 APPL MULTLAYER CMPRN SYS LEG: CPT

## 2024-09-30 PROCEDURE — 6370000000 HC RX 637 (ALT 250 FOR IP): Performed by: SPECIALIST

## 2024-09-30 PROCEDURE — 11045 DBRDMT SUBQ TISS EACH ADDL: CPT

## 2024-09-30 PROCEDURE — 11045 DBRDMT SUBQ TISS EACH ADDL: CPT | Performed by: SPECIALIST

## 2024-09-30 PROCEDURE — 11042 DBRDMT SUBQ TIS 1ST 20SQCM/<: CPT | Performed by: SPECIALIST

## 2024-09-30 PROCEDURE — 11042 DBRDMT SUBQ TIS 1ST 20SQCM/<: CPT

## 2024-09-30 RX ORDER — CLOBETASOL PROPIONATE 0.5 MG/G
OINTMENT TOPICAL ONCE
OUTPATIENT
Start: 2024-09-30 | End: 2024-09-30

## 2024-09-30 RX ORDER — GINSENG 100 MG
CAPSULE ORAL ONCE
OUTPATIENT
Start: 2024-09-30 | End: 2024-09-30

## 2024-09-30 RX ORDER — SODIUM CHLOR/HYPOCHLOROUS ACID 0.033 %
SOLUTION, IRRIGATION IRRIGATION ONCE
OUTPATIENT
Start: 2024-09-30 | End: 2024-09-30

## 2024-09-30 RX ORDER — LIDOCAINE 40 MG/G
CREAM TOPICAL ONCE
Status: COMPLETED | OUTPATIENT
Start: 2024-09-30 | End: 2024-09-30

## 2024-09-30 RX ORDER — TRIAMCINOLONE ACETONIDE 1 MG/G
OINTMENT TOPICAL ONCE
OUTPATIENT
Start: 2024-09-30 | End: 2024-09-30

## 2024-09-30 RX ORDER — BETAMETHASONE DIPROPIONATE 0.05 %
OINTMENT (GRAM) TOPICAL ONCE
OUTPATIENT
Start: 2024-09-30 | End: 2024-09-30

## 2024-09-30 RX ORDER — LIDOCAINE HYDROCHLORIDE 20 MG/ML
JELLY TOPICAL ONCE
OUTPATIENT
Start: 2024-09-30 | End: 2024-09-30

## 2024-09-30 RX ORDER — SILVER SULFADIAZINE 10 MG/G
CREAM TOPICAL ONCE
OUTPATIENT
Start: 2024-09-30 | End: 2024-09-30

## 2024-09-30 RX ORDER — NEOMYCIN/BACITRACIN/POLYMYXINB 3.5-400-5K
OINTMENT (GRAM) TOPICAL ONCE
OUTPATIENT
Start: 2024-09-30 | End: 2024-09-30

## 2024-09-30 RX ORDER — LIDOCAINE HYDROCHLORIDE 40 MG/ML
SOLUTION TOPICAL ONCE
OUTPATIENT
Start: 2024-09-30 | End: 2024-09-30

## 2024-09-30 RX ORDER — LIDOCAINE 40 MG/G
CREAM TOPICAL ONCE
OUTPATIENT
Start: 2024-09-30 | End: 2024-09-30

## 2024-09-30 RX ORDER — MUPIROCIN 20 MG/G
OINTMENT TOPICAL ONCE
OUTPATIENT
Start: 2024-09-30 | End: 2024-09-30

## 2024-09-30 RX ORDER — BACITRACIN ZINC AND POLYMYXIN B SULFATE 500; 1000 [USP'U]/G; [USP'U]/G
OINTMENT TOPICAL ONCE
OUTPATIENT
Start: 2024-09-30 | End: 2024-09-30

## 2024-09-30 RX ORDER — GENTAMICIN SULFATE 1 MG/G
OINTMENT TOPICAL ONCE
OUTPATIENT
Start: 2024-09-30 | End: 2024-09-30

## 2024-09-30 RX ORDER — LIDOCAINE 50 MG/G
OINTMENT TOPICAL ONCE
OUTPATIENT
Start: 2024-09-30 | End: 2024-09-30

## 2024-09-30 RX ADMIN — LIDOCAINE: 40 CREAM TOPICAL at 08:38

## 2024-09-30 ASSESSMENT — PAIN SCALES - GENERAL: PAINLEVEL_OUTOF10: 5

## 2024-09-30 ASSESSMENT — PAIN DESCRIPTION - DESCRIPTORS: DESCRIPTORS: ACHING;BURNING

## 2024-09-30 ASSESSMENT — PAIN DESCRIPTION - ORIENTATION: ORIENTATION: RIGHT

## 2024-09-30 ASSESSMENT — PAIN DESCRIPTION - LOCATION: LOCATION: LEG

## 2024-09-30 NOTE — PROGRESS NOTES
[facility]  Progress Note and Procedure Note      Justin Baeza  MEDICAL RECORD NUMBER:  [id number]  AGE: 59 y.o.   GENDER: male  : 1965  EPISODE DATE:  2024    Subjective:     Chief Complaint   Patient presents with    Wound Check     Right lower leg         HISTORY of PRESENT ILLNESS HPI     Justin Baeza is a 59 y.o. male who presents today for wound/ulcer evaluation.   History of Wound Context:  : Patient continues follow-up for chronic venous insufficiency with ulceration and lymphedema right lower extremity. He has now been off his antibiotics for several weeks .  Patient attempting to communicate with Dr. Agustin who left message for him to discuss efficacy of resuming antibiotics.  Wound/Ulcer Pain Timing/Severity: none  Quality of pain: N/A  Severity:  0 / 10   Modifying Factors: None  Associated Signs/Symptoms: edema, drainage, and odor    Ulcer Identification:  Ulcer Type: venous    Contributing Factors: edema, venous stasis, lymphedema, poor glucose control, obesity, and anticoagulation therapy    Acute Wound: N/A not an acute wound    PAST MEDICAL HISTORY        Diagnosis Date    DVT (deep venous thrombosis) (HCC)     Hx of blood clots     Pain and swelling of right lower leg        PAST SURGICAL HISTORY    Past Surgical History:   Procedure Laterality Date    BALLOON ANGIOPLASTY, ARTERY Right 2017    at [facility]    DILATATION, ESOPHAGUS      SKIN SPLIT GRAFT Right        FAMILY HISTORY    Family History   Problem Relation Age of Onset    Diabetes Mother     Heart Attack Father        SOCIAL HISTORY    Social History     Tobacco Use    Smoking status: Never    Smokeless tobacco: Never   Vaping Use    Vaping status: Never Used   Substance Use Topics    Alcohol use: No    Drug use: No       ALLERGIES    No Known Allergies    MEDICATIONS    Current Outpatient Medications on File Prior to Encounter   Medication Sig Dispense Refill    warfarin (COUMADIN) 5 MG

## 2024-09-30 NOTE — TELEPHONE ENCOUNTER
Pt reports R leg has drainage, no odor    Cult - mod E cloacae, mod E faecalis, rare S capitis, rare Ps aeruginosa  Pseudomonas now R Pip / Tazo  Pt had course po Levofloxacin  Pt gets multilayer compression wrap 2 times a week.  He has appt at [facility] today    May start with Augmentin 875 bid  If not effective, will place PICC , give IV

## 2024-09-30 NOTE — PROGRESS NOTES
Multilayer Compression Wrap   (Not Unna) Below the Knee    NAME:  Justin Baeza  YOB: 1965  MEDICAL RECORD NUMBER:  [id number]  DATE:  9/30/2024       Removed old Multilayer wrap if present and washed leg with mild soap/water.   Applied moisturizing agent to dry skin as needed.    Applied primary and secondary dressing as ordered    Applied multilayered dressing below the knee to Right lower leg(s)  (4 Layer Compression Wrap ) .    Instructed patient/caregiver not to remove dressing and to keep it clean and dry.    Instructed patient/caregiver on complications to report to provider, such as pain, numbness in toes, heavy drainage, and slippage of dressing.    Instructed patient on purpose of compression dressing and on activity and exercise recommendations.   Applied per   Guidelines    Electronically signed by Kathy Gomez RN on 9/30/2024 at 8:54 AM

## 2024-09-30 NOTE — PATIENT INSTRUCTIONS
Compression Wrap    Do not get leg(s) with wrap wet.  If wraps become too tight call the center or completely remove the wrap.   Elevate leg(s) above the level of the heart when sitting.  Avoid prolonged standing in one place.   Applied in Clinic on 9/30/2024  The Goal of this therapy is to reduce edema and get into long term compression garments to control venous insufficiency, lymphedema and reduce occurrence of venous ulcers    [x] Edema Control: 30-40mmHG  Apply: Compression Stocking on the Left leg  Apply every morning immediately when getting up. Remove every night before going to bed unless instructed otherwise     Elevate leg(s) above the level of the heart for 30 minutes 4-5 times a day and/or when sitting.  Avoid prolonged standing in one place.    [x] Lymphedema Therapy:  Wear Lymphedema pumps twice a day at settings prescribed by your physician.   Avoid prolonged standing in one place.      Dietary:  Important dietary reminders:  1. Increase Protein intake (i.e. Lean meats, fish, eggs, legumes, and yogurt)  2. No added salt  3. If diabetic, follow a diabetic diet and check glucose prior to meals or as instructed by your physician.    Dietary Supplements(Take twice a day unless instructed otherwise):  [] Papito  [] 30ml ProStat [] Ensure Complete [] Ensure Max/Premier [] Expedite [] Other:    Your nurse  is:  talisha     Electronically signed by Talisha Hawkins RN on 9/30/2024 at 8:46 AM     Wound Care Center Information: Should you experience any significant changes in your wound(s) or have questions about your wound care, please contact the [facility] at [phone].   Hours of operation:  Mon:  8AM - 2PM  Tue: 11AM - 5PM  Wed: CLOSED  Thur: 8AM - 4:30PM  Fri:  8AM - 4:30PM  The office is closed on all major holidays.    Please give us 24-48 business hours to return your call.  These hours of operation are subject to change. If you need help with your wounds and cannot

## 2024-10-03 NOTE — TELEPHONE ENCOUNTER
ANDIEM with patient, reminded him to call the clinic to r/s INR check appointment.     Amirah Soriano PharmD  [facility]  [place]: [phone]  [place]: [phone]  10/3/2024 3:29 PM

## 2024-10-04 ENCOUNTER — HOSPITAL ENCOUNTER (OUTPATIENT)
Dept: WOUND CARE | Age: 59
Discharge: HOME | End: 2024-10-04
Attending: SPECIALIST
Payer: COMMERCIAL

## 2024-10-04 VITALS
SYSTOLIC BLOOD PRESSURE: 130 MMHG | DIASTOLIC BLOOD PRESSURE: 70 MMHG | TEMPERATURE: 96.8 F | HEART RATE: 80 BPM | RESPIRATION RATE: 16 BRPM

## 2024-10-04 PROCEDURE — 29581 APPL MULTLAYER CMPRN SYS LEG: CPT

## 2024-10-04 ASSESSMENT — PAIN DESCRIPTION - DESCRIPTORS: DESCRIPTORS: ACHING;BURNING

## 2024-10-04 ASSESSMENT — PAIN DESCRIPTION - PAIN TYPE: TYPE: CHRONIC PAIN

## 2024-10-04 ASSESSMENT — PAIN DESCRIPTION - ORIENTATION: ORIENTATION: RIGHT

## 2024-10-04 ASSESSMENT — PAIN SCALES - GENERAL: PAINLEVEL_OUTOF10: 3

## 2024-10-04 ASSESSMENT — PAIN DESCRIPTION - LOCATION: LOCATION: LEG

## 2024-10-04 NOTE — PATIENT INSTRUCTIONS
Wound Care Center Physician Orders and Discharge Instructions  [facility]  [address] [facility]. [address] [place] [address]  Telephone: [phone]      FAX [phone]    NAME:  Justin Baeza  YOB: 1965  MEDICAL RECORD NUMBER:  [id number]  DATE:  10/4/2024      Wound care:  Continue to follow the instructions and recommendations from your last doctor visit.  The dressing(s) applied is the same as your last visit. Please refer to your last discharge instruction for the information on your wound care.      If there were any changes made, please follow the instructions as written here: none    Future Appointments     Future Appointments   Date Time Provider Department Center   10/7/2024  8:00 AM Khang Giles MD [facility] WOUND [facility]           Your nurse  is:  Yesenia     Electronically signed by Kathy Gomez RN on 10/4/2024 at 12:29 PM     Wound Care Center Information: Should you experience any significant changes in your wound(s) or have questions about your wound care, please contact the [facility] at [phone]. Our hours vary so please leave a message. Please give us 24-48 hours to return your call.   If you need help with your wounds and cannot wait until we are available, contact your PCP or go to the hospital emergency room.       Physician orders by:  Dr. Giles        The information contained in the After Visit Summary has been reviewed with me, the patient and/or responsible adult, by my health care provider(s).  I had the opportunity to ask questions regarding this information.  I have elected to receive;      [] Patient unable to sign Discharge Instructions. Given to ECF/Transportation/POA

## 2024-10-04 NOTE — PROGRESS NOTES
Multilayer Compression Wrap   (Not Unna) Below the Knee    NAME:  Justin Baeza  YOB: 1965  MEDICAL RECORD NUMBER:  [id number]  DATE:  10/4/2024       Removed old Multilayer wrap if present and washed leg with mild soap/water.   Applied moisturizing agent to dry skin as needed.    Applied primary and secondary dressing as ordered    Applied multilayered dressing below the knee to Right lower leg(s)  (4 Layer Compression Wrap ) .    Instructed patient/caregiver not to remove dressing and to keep it clean and dry.    Instructed patient/caregiver on complications to report to provider, such as pain, numbness in toes, heavy drainage, and slippage of dressing.    Instructed patient on purpose of compression dressing and on activity and exercise recommendations.   Applied per   Guidelines    Electronically signed by Kathy Gomez RN on 10/4/2024 at 12:28 PM         Tarsorrhaphy Performed?: No

## 2024-10-07 ENCOUNTER — HOSPITAL ENCOUNTER (OUTPATIENT)
Dept: WOUND CARE | Age: 59
Discharge: HOME | End: 2024-10-07
Attending: SPECIALIST
Payer: COMMERCIAL

## 2024-10-07 VITALS
DIASTOLIC BLOOD PRESSURE: 77 MMHG | TEMPERATURE: 97 F | HEART RATE: 69 BPM | RESPIRATION RATE: 16 BRPM | SYSTOLIC BLOOD PRESSURE: 143 MMHG

## 2024-10-07 DIAGNOSIS — I89.0 LYMPHEDEMA: ICD-10-CM

## 2024-10-07 DIAGNOSIS — I87.331 IDIOPATHIC CHRONIC VENOUS HYPERTENSION OF RIGHT LOWER EXTREMITY WITH ULCER AND INFLAMMATION (HCC): ICD-10-CM

## 2024-10-07 DIAGNOSIS — L97.909 VENOUS ULCER (HCC): ICD-10-CM

## 2024-10-07 DIAGNOSIS — Z91.199 NONCOMPLIANCE: ICD-10-CM

## 2024-10-07 DIAGNOSIS — I89.0 CHRONIC ACQUIRED LYMPHEDEMA: Primary | ICD-10-CM

## 2024-10-07 DIAGNOSIS — I83.009 VENOUS ULCER (HCC): ICD-10-CM

## 2024-10-07 PROCEDURE — 29581 APPL MULTLAYER CMPRN SYS LEG: CPT

## 2024-10-07 PROCEDURE — 11045 DBRDMT SUBQ TISS EACH ADDL: CPT | Performed by: SPECIALIST

## 2024-10-07 PROCEDURE — 11042 DBRDMT SUBQ TIS 1ST 20SQCM/<: CPT

## 2024-10-07 PROCEDURE — 11042 DBRDMT SUBQ TIS 1ST 20SQCM/<: CPT | Performed by: SPECIALIST

## 2024-10-07 PROCEDURE — 11045 DBRDMT SUBQ TISS EACH ADDL: CPT

## 2024-10-07 PROCEDURE — 6370000000 HC RX 637 (ALT 250 FOR IP): Performed by: SPECIALIST

## 2024-10-07 RX ORDER — SILVER SULFADIAZINE 10 MG/G
CREAM TOPICAL ONCE
OUTPATIENT
Start: 2024-10-07 | End: 2024-10-07

## 2024-10-07 RX ORDER — GENTAMICIN SULFATE 1 MG/G
OINTMENT TOPICAL ONCE
OUTPATIENT
Start: 2024-10-07 | End: 2024-10-07

## 2024-10-07 RX ORDER — LIDOCAINE HYDROCHLORIDE 20 MG/ML
JELLY TOPICAL ONCE
OUTPATIENT
Start: 2024-10-07 | End: 2024-10-07

## 2024-10-07 RX ORDER — SODIUM CHLOR/HYPOCHLOROUS ACID 0.033 %
SOLUTION, IRRIGATION IRRIGATION ONCE
OUTPATIENT
Start: 2024-10-07 | End: 2024-10-07

## 2024-10-07 RX ORDER — CLOBETASOL PROPIONATE 0.5 MG/G
OINTMENT TOPICAL ONCE
OUTPATIENT
Start: 2024-10-07 | End: 2024-10-07

## 2024-10-07 RX ORDER — LIDOCAINE HYDROCHLORIDE 40 MG/ML
SOLUTION TOPICAL ONCE
OUTPATIENT
Start: 2024-10-07 | End: 2024-10-07

## 2024-10-07 RX ORDER — MUPIROCIN 20 MG/G
OINTMENT TOPICAL ONCE
OUTPATIENT
Start: 2024-10-07 | End: 2024-10-07

## 2024-10-07 RX ORDER — BACITRACIN ZINC AND POLYMYXIN B SULFATE 500; 1000 [USP'U]/G; [USP'U]/G
OINTMENT TOPICAL ONCE
OUTPATIENT
Start: 2024-10-07 | End: 2024-10-07

## 2024-10-07 RX ORDER — GINSENG 100 MG
CAPSULE ORAL ONCE
OUTPATIENT
Start: 2024-10-07 | End: 2024-10-07

## 2024-10-07 RX ORDER — TRIAMCINOLONE ACETONIDE 1 MG/G
OINTMENT TOPICAL ONCE
OUTPATIENT
Start: 2024-10-07 | End: 2024-10-07

## 2024-10-07 RX ORDER — LIDOCAINE HYDROCHLORIDE 40 MG/ML
SOLUTION TOPICAL ONCE
Status: COMPLETED | OUTPATIENT
Start: 2024-10-07 | End: 2024-10-07

## 2024-10-07 RX ORDER — BETAMETHASONE DIPROPIONATE 0.05 %
OINTMENT (GRAM) TOPICAL ONCE
OUTPATIENT
Start: 2024-10-07 | End: 2024-10-07

## 2024-10-07 RX ORDER — LIDOCAINE 50 MG/G
OINTMENT TOPICAL ONCE
OUTPATIENT
Start: 2024-10-07 | End: 2024-10-07

## 2024-10-07 RX ORDER — LIDOCAINE 40 MG/G
CREAM TOPICAL ONCE
OUTPATIENT
Start: 2024-10-07 | End: 2024-10-07

## 2024-10-07 RX ORDER — NEOMYCIN/BACITRACIN/POLYMYXINB 3.5-400-5K
OINTMENT (GRAM) TOPICAL ONCE
OUTPATIENT
Start: 2024-10-07 | End: 2024-10-07

## 2024-10-07 RX ADMIN — LIDOCAINE HYDROCHLORIDE: 40 SOLUTION TOPICAL at 08:18

## 2024-10-07 ASSESSMENT — PAIN SCALES - GENERAL: PAINLEVEL_OUTOF10: 3

## 2024-10-07 ASSESSMENT — PAIN DESCRIPTION - FREQUENCY: FREQUENCY: INTERMITTENT

## 2024-10-07 ASSESSMENT — PAIN DESCRIPTION - DESCRIPTORS: DESCRIPTORS: ACHING;BURNING

## 2024-10-07 ASSESSMENT — PAIN DESCRIPTION - LOCATION: LOCATION: LEG

## 2024-10-07 ASSESSMENT — PAIN DESCRIPTION - PAIN TYPE: TYPE: CHRONIC PAIN

## 2024-10-07 NOTE — PROGRESS NOTES
Multilayer Compression Wrap   (Not Unna) Below the Knee    NAME:  Justin Baeza  YOB: 1965  MEDICAL RECORD NUMBER:  [id number]  DATE:  10/7/2024       Removed old Multilayer wrap if present and washed leg with mild soap/water.   Applied moisturizing agent to dry skin as needed.    Applied primary and secondary dressing as ordered    Applied multilayered dressing below the knee to right lower leg(s)  (4 Layer Compression Wrap ) .    Instructed patient/caregiver not to remove dressing and to keep it clean and dry.    Instructed patient/caregiver on complications to report to provider, such as pain, numbness in toes, heavy drainage, and slippage of dressing.    Instructed patient on purpose of compression dressing and on activity and exercise recommendations.   Applied per   Guidelines    Electronically signed by Mera Coelho RN on 10/7/2024 at 8:50 AM

## 2024-10-07 NOTE — PROGRESS NOTES
[facility]  Progress Note and Procedure Note      Justin Baeza  MEDICAL RECORD NUMBER:  [id number]  AGE: 59 y.o.   GENDER: male  : 1965  EPISODE DATE:  10/7/2024    Subjective:     Chief Complaint   Patient presents with    Wound Check     Right lower leg           HISTORY of PRESENT ILLNESS HPI     Justin Baeza is a 59 y.o. male who presents today for wound/ulcer evaluation.   History of Wound Context: Patient continues follow-up for chronic venous insufficiency with ulceration and lymphedema right lower extremity. He has now been off his antibiotics for several weeks.  Patient now on Augmentin as ordered by Dr. Agustin from infectious disease.  Patient continues to be on his feet extended periods of time during the day.  Little utilization of lymphedema pumps  Wound/Ulcer Pain Timing/Severity: none  Quality of pain: N/A  Severity:  0 / 10   Modifying Factors: None  Associated Signs/Symptoms: edema and drainage    Ulcer Identification:  Ulcer Type: venous    Contributing Factors: edema, venous stasis, lymphedema, poor glucose control, obesity, and anticoagulation therapy    Acute Wound: N/A not an acute wound    PAST MEDICAL HISTORY        Diagnosis Date    DVT (deep venous thrombosis) (HCC)     Hx of blood clots     Pain and swelling of right lower leg        PAST SURGICAL HISTORY    Past Surgical History:   Procedure Laterality Date    BALLOON ANGIOPLASTY, ARTERY Right 2017    at [facility]    DILATATION, ESOPHAGUS      SKIN SPLIT GRAFT Right        FAMILY HISTORY    Family History   Problem Relation Age of Onset    Diabetes Mother     Heart Attack Father        SOCIAL HISTORY    Social History     Tobacco Use    Smoking status: Never    Smokeless tobacco: Never   Vaping Use    Vaping status: Never Used   Substance Use Topics    Alcohol use: No    Drug use: No       ALLERGIES    No Known Allergies    MEDICATIONS    Current Outpatient Medications on File Prior to

## 2024-10-07 NOTE — PATIENT INSTRUCTIONS
Wound Care Center Physician Orders and Discharge Instructions  [facility]   [address]  Telephone: [phone] FAX [phone]  NAME:  Justin Baeza  YOB: 1965  MEDICAL RECORD NUMBER:  [id number]  DATE: 10/7/2024      Return Appointment:  Return Appointment: With Khang Giles MD  in 1 Week(s)  [x] Return Appointment for a Wound Assessment with the nurse on:10/11/2024    No future appointments.            Orders Placed This Encounter   Procedures    Initiate Outpatient Wound Care Protocol       Home Care Company: none    Medically necessary services for evaluation and treatment: []Skilled Nursing (using clean technique) []PT (Eval & Treat) []OT (Eval & Treat) []Social Work []Dietician []Other:      Wound care instructions:  If you smoke we ask that you refrain from smoking. Smoking inhibits wounds from healing.  When taking antibiotics take the entire prescription as ordered. Do not stop taking until medication is all gone unless otherwise instructed.   Exercise as tolerated.   Keep weight off wounds and reposition every 2 hours if applicable.  Do not get wounds wet in the bath or shower unless otherwise instructed by your physician. If your wound is on your foot or leg, you may purchase a cast bag. Please ask at the pharmacy.  Wash hands with soap and water prior to and after every dressing change.    [x]Wash wounds with: No need to wash. Leave dressing in place.    [x]Kiah wound Topical Treatments: Do not apply lotions, creams, or ointments to the skin around the wound bed unless directed as followed:   Apply around the wound: zinc paste         [x]Wound Location: right lower leg wounds  Apply Primary Dressing to wound: polymem ag  Secondary Dressing: Extra absorbant pad   Avoid contact of tape with skin if possible.  When to change Dressing: DO NOT CHANGE      [x] Multilayer Compression Wrap:  Type: Applied on Right lower leg(s)  4

## 2024-10-11 ENCOUNTER — HOSPITAL ENCOUNTER (OUTPATIENT)
Dept: WOUND CARE | Age: 59
Discharge: HOME | End: 2024-10-11
Attending: SPECIALIST
Payer: COMMERCIAL

## 2024-10-11 PROCEDURE — 29581 APPL MULTLAYER CMPRN SYS LEG: CPT

## 2024-10-11 NOTE — PROGRESS NOTES
Multilayer Compression Wrap   (Not Unna) Below the Knee    NAME:  Justin Baeza  YOB: 1965  MEDICAL RECORD NUMBER:  [id number]  DATE:  10/11/2024       Removed old Multilayer wrap if present and washed leg with mild soap/water.   Applied moisturizing agent to dry skin as needed.    Applied primary and secondary dressing as ordered    Applied multilayered dressing below the knee to Right lower leg(s)  (4 Layer Compression Wrap ) .    Instructed patient/caregiver not to remove dressing and to keep it clean and dry.    Instructed patient/caregiver on complications to report to provider, such as pain, numbness in toes, heavy drainage, and slippage of dressing.    Instructed patient on purpose of compression dressing and on activity and exercise recommendations.   Applied per   Guidelines    Electronically signed by Ashley Aviles RN on 10/11/2024 at 12:33 PM

## 2024-10-11 NOTE — PROGRESS NOTES
Wound Care Center Physician Orders and Discharge Instructions  [facility]  [address] [facility]. [address] [place] [address]  Telephone: [phone]      FAX [phone]    NAME:  Justin Baeza  YOB: 1965  MEDICAL RECORD NUMBER:  [id number]  DATE:  10/11/2024      Wound care:  Continue to follow the instructions and recommendations from your last doctor visit.  The dressing(s) applied is the same as your last visit. Please refer to your last discharge instruction for the information on your wound care.      If there were any changes made, please follow the instructions as written here:     Future Appointments     Future Appointments   Date Time Provider Department Center   10/14/2024  8:00 AM Khang Giles MD [facility] WOUND [facility]           Your nurse  is:  Yesenia     Electronically signed by Ashley Aviles RN on 10/11/2024 at 12:32 PM     Wound Care Center Information: Should you experience any significant changes in your wound(s) or have questions about your wound care, please contact the [facility] at [phone]. Our hours vary so please leave a message. Please give us 24-48 hours to return your call.   If you need help with your wounds and cannot wait until we are available, contact your PCP or go to the hospital emergency room.       Physician orders by:  Dr. Giles        The information contained in the After Visit Summary has been reviewed with me, the patient and/or responsible adult, by my health care provider(s).  I had the opportunity to ask questions regarding this information.  I have elected to receive;      [] Patient unable to sign Discharge Instructions. Given to ECF/Transportation/POA

## 2024-10-14 ENCOUNTER — HOSPITAL ENCOUNTER (OUTPATIENT)
Dept: WOUND CARE | Age: 59
Discharge: HOME | End: 2024-10-14
Attending: SPECIALIST
Payer: COMMERCIAL

## 2024-10-14 VITALS
DIASTOLIC BLOOD PRESSURE: 80 MMHG | BODY MASS INDEX: 40.83 KG/M2 | HEIGHT: 67 IN | HEART RATE: 66 BPM | TEMPERATURE: 97 F | SYSTOLIC BLOOD PRESSURE: 138 MMHG | RESPIRATION RATE: 16 BRPM | WEIGHT: 260.14 LBS

## 2024-10-14 DIAGNOSIS — I89.0 LYMPHEDEMA: ICD-10-CM

## 2024-10-14 DIAGNOSIS — Z91.199 NONCOMPLIANCE: ICD-10-CM

## 2024-10-14 DIAGNOSIS — I87.331 IDIOPATHIC CHRONIC VENOUS HYPERTENSION OF RIGHT LOWER EXTREMITY WITH ULCER AND INFLAMMATION (HCC): ICD-10-CM

## 2024-10-14 DIAGNOSIS — I83.009 VENOUS ULCER (HCC): ICD-10-CM

## 2024-10-14 DIAGNOSIS — L97.909 VENOUS ULCER (HCC): ICD-10-CM

## 2024-10-14 DIAGNOSIS — I89.0 CHRONIC ACQUIRED LYMPHEDEMA: Primary | ICD-10-CM

## 2024-10-14 PROCEDURE — 11042 DBRDMT SUBQ TIS 1ST 20SQCM/<: CPT

## 2024-10-14 PROCEDURE — 11045 DBRDMT SUBQ TISS EACH ADDL: CPT | Performed by: SPECIALIST

## 2024-10-14 PROCEDURE — 11042 DBRDMT SUBQ TIS 1ST 20SQCM/<: CPT | Performed by: SPECIALIST

## 2024-10-14 PROCEDURE — 11045 DBRDMT SUBQ TISS EACH ADDL: CPT

## 2024-10-14 PROCEDURE — 6370000000 HC RX 637 (ALT 250 FOR IP): Performed by: SPECIALIST

## 2024-10-14 PROCEDURE — 29581 APPL MULTLAYER CMPRN SYS LEG: CPT

## 2024-10-14 RX ORDER — CLOBETASOL PROPIONATE 0.5 MG/G
OINTMENT TOPICAL ONCE
OUTPATIENT
Start: 2024-10-14 | End: 2024-10-14

## 2024-10-14 RX ORDER — TRIAMCINOLONE ACETONIDE 1 MG/G
OINTMENT TOPICAL ONCE
OUTPATIENT
Start: 2024-10-14 | End: 2024-10-14

## 2024-10-14 RX ORDER — GENTAMICIN SULFATE 1 MG/G
OINTMENT TOPICAL ONCE
OUTPATIENT
Start: 2024-10-14 | End: 2024-10-14

## 2024-10-14 RX ORDER — BACITRACIN ZINC AND POLYMYXIN B SULFATE 500; 1000 [USP'U]/G; [USP'U]/G
OINTMENT TOPICAL ONCE
OUTPATIENT
Start: 2024-10-14 | End: 2024-10-14

## 2024-10-14 RX ORDER — LIDOCAINE HYDROCHLORIDE 40 MG/ML
SOLUTION TOPICAL ONCE
Status: COMPLETED | OUTPATIENT
Start: 2024-10-14 | End: 2024-10-14

## 2024-10-14 RX ORDER — BETAMETHASONE DIPROPIONATE 0.05 %
OINTMENT (GRAM) TOPICAL ONCE
OUTPATIENT
Start: 2024-10-14 | End: 2024-10-14

## 2024-10-14 RX ORDER — LIDOCAINE 50 MG/G
OINTMENT TOPICAL ONCE
OUTPATIENT
Start: 2024-10-14 | End: 2024-10-14

## 2024-10-14 RX ORDER — LIDOCAINE HYDROCHLORIDE 40 MG/ML
SOLUTION TOPICAL ONCE
OUTPATIENT
Start: 2024-10-14 | End: 2024-10-14

## 2024-10-14 RX ORDER — MUPIROCIN 20 MG/G
OINTMENT TOPICAL ONCE
OUTPATIENT
Start: 2024-10-14 | End: 2024-10-14

## 2024-10-14 RX ORDER — SODIUM CHLOR/HYPOCHLOROUS ACID 0.033 %
SOLUTION, IRRIGATION IRRIGATION ONCE
OUTPATIENT
Start: 2024-10-14 | End: 2024-10-14

## 2024-10-14 RX ORDER — LIDOCAINE HYDROCHLORIDE 20 MG/ML
JELLY TOPICAL ONCE
OUTPATIENT
Start: 2024-10-14 | End: 2024-10-14

## 2024-10-14 RX ORDER — LIDOCAINE 40 MG/G
CREAM TOPICAL ONCE
OUTPATIENT
Start: 2024-10-14 | End: 2024-10-14

## 2024-10-14 RX ORDER — SILVER SULFADIAZINE 10 MG/G
CREAM TOPICAL ONCE
OUTPATIENT
Start: 2024-10-14 | End: 2024-10-14

## 2024-10-14 RX ORDER — NEOMYCIN/BACITRACIN/POLYMYXINB 3.5-400-5K
OINTMENT (GRAM) TOPICAL ONCE
OUTPATIENT
Start: 2024-10-14 | End: 2024-10-14

## 2024-10-14 RX ORDER — GINSENG 100 MG
CAPSULE ORAL ONCE
OUTPATIENT
Start: 2024-10-14 | End: 2024-10-14

## 2024-10-14 RX ADMIN — LIDOCAINE HYDROCHLORIDE: 40 SOLUTION TOPICAL at 08:14

## 2024-10-14 ASSESSMENT — PAIN SCALES - GENERAL: PAINLEVEL_OUTOF10: 3

## 2024-10-14 ASSESSMENT — PAIN DESCRIPTION - LOCATION: LOCATION: LEG

## 2024-10-14 ASSESSMENT — PAIN DESCRIPTION - ORIENTATION: ORIENTATION: RIGHT

## 2024-10-14 ASSESSMENT — PAIN DESCRIPTION - PAIN TYPE: TYPE: CHRONIC PAIN

## 2024-10-14 ASSESSMENT — PAIN DESCRIPTION - DESCRIPTORS: DESCRIPTORS: ACHING

## 2024-10-14 ASSESSMENT — PAIN DESCRIPTION - FREQUENCY: FREQUENCY: CONTINUOUS

## 2024-10-14 NOTE — PATIENT INSTRUCTIONS
Wound Care Center Physician Orders and Discharge Instructions  [facility]   [address]  Telephone: [phone] FAX [phone]  NAME:  Justin Baeza  YOB: 1965  MEDICAL RECORD NUMBER:  [id number]  DATE: 10/14/2024      Return Appointment:  Return Appointment: With Khang Giles MD  in 1 Week(s)  [x] Return Appointment for a Wound Assessment with the nurse on:10/18/24    No future appointments.            Orders Placed This Encounter   Procedures    Initiate Outpatient Wound Care Protocol       Home Care Company: none    Medically necessary services for evaluation and treatment: []Skilled Nursing (using clean technique) []PT (Eval & Treat) []OT (Eval & Treat) []Social Work []Dietician []Other:      Wound care instructions:  If you smoke we ask that you refrain from smoking. Smoking inhibits wounds from healing.  When taking antibiotics take the entire prescription as ordered. Do not stop taking until medication is all gone unless otherwise instructed.   Exercise as tolerated.   Keep weight off wounds and reposition every 2 hours if applicable.  Do not get wounds wet in the bath or shower unless otherwise instructed by your physician. If your wound is on your foot or leg, you may purchase a cast bag. Please ask at the pharmacy.  Wash hands with soap and water prior to and after every dressing change.    [x]Wash wounds with: No need to wash. Leave dressing in place.    [x]Kiah wound Topical Treatments: Do not apply lotions, creams, or ointments to the skin around the wound bed unless directed as followed:   Apply around the wound: zinc paste         [x]Wound Location: right lower leg wounds  Apply Primary Dressing to wound: polymem ag  Secondary Dressing: Extra absorbant pad   Avoid contact of tape with skin if possible.  When to change Dressing: DO NOT CHANGE      [x] Multilayer Compression Wrap:  Type: Applied on Right lower leg(s)  4 Layer

## 2024-10-14 NOTE — PROGRESS NOTES
Multilayer Compression Wrap   (Not Unna) Below the Knee    NAME:  Justin Baeza  YOB: 1965  MEDICAL RECORD NUMBER:  [id number]  DATE:  10/14/2024       Removed old Multilayer wrap if present and washed leg with mild soap/water.   Applied moisturizing agent to dry skin as needed.    Applied primary and secondary dressing as ordered    Applied multilayered dressing below the knee to Right lower leg(s)  (4 Layer Compression Wrap ) .    Instructed patient/caregiver not to remove dressing and to keep it clean and dry.    Instructed patient/caregiver on complications to report to provider, such as pain, numbness in toes, heavy drainage, and slippage of dressing.    Instructed patient on purpose of compression dressing and on activity and exercise recommendations.   Applied per   Guidelines    Electronically signed by Kathy Gomez RN on 10/14/2024 at 8:53 AM      
Appointment:  Return Appointment: With Khagn Giles MD  in 1 Week(s)  [x] Return Appointment for a Wound Assessment with the nurse on:10/18/24    No future appointments.            Orders Placed This Encounter   Procedures    Initiate Outpatient Wound Care Protocol       Home Care Company: none    Medically necessary services for evaluation and treatment: []Skilled Nursing (using clean technique) []PT (Eval & Treat) []OT (Eval & Treat) []Social Work []Dietician []Other:      Wound care instructions:  If you smoke we ask that you refrain from smoking. Smoking inhibits wounds from healing.  When taking antibiotics take the entire prescription as ordered. Do not stop taking until medication is all gone unless otherwise instructed.   Exercise as tolerated.   Keep weight off wounds and reposition every 2 hours if applicable.  Do not get wounds wet in the bath or shower unless otherwise instructed by your physician. If your wound is on your foot or leg, you may purchase a cast bag. Please ask at the pharmacy.  Wash hands with soap and water prior to and after every dressing change.    [x]Wash wounds with: No need to wash. Leave dressing in place.    [x]Kiah wound Topical Treatments: Do not apply lotions, creams, or ointments to the skin around the wound bed unless directed as followed:   Apply around the wound: zinc paste         [x]Wound Location: right lower leg wounds  Apply Primary Dressing to wound: polymem ag  Secondary Dressing: Extra absorbant pad   Avoid contact of tape with skin if possible.  When to change Dressing: DO NOT CHANGE      [x] Multilayer Compression Wrap:  Type: Applied on Right lower leg(s)  4 Layer Compression Wrap    Do not get leg(s) with wrap wet.  If wraps become too tight call the center or completely remove the wrap.   Elevate leg(s) above the level of the heart when sitting.  Avoid prolonged standing in one place.   Applied in Clinic on 10/14/2024  The Goal of this therapy is to reduce

## 2024-10-14 NOTE — TELEPHONE ENCOUNTER
Patient has wound care appt on Friday. LVM with patient notifying I scheduled him for INR check Friday prior to wound care appt. Asked patient to call back to verify that this will work.     Tena SalasD, [facility]  [facility]  [place]: [phone]  [place]: [phone]  10/14/2024 2:58 PM

## 2024-10-18 ENCOUNTER — ANTI-COAG VISIT (OUTPATIENT)
Dept: PHARMACY | Age: 59
End: 2024-10-18
Payer: COMMERCIAL

## 2024-10-18 ENCOUNTER — HOSPITAL ENCOUNTER (OUTPATIENT)
Dept: WOUND CARE | Age: 59
Discharge: HOME | End: 2024-10-18
Attending: SPECIALIST
Payer: COMMERCIAL

## 2024-10-18 DIAGNOSIS — I82.5Z1 CHRONIC VENOUS EMBOLISM AND THROMBOSIS OF DEEP VESSELS OF DISTAL END OF RIGHT LOWER EXTREMITY (HCC): Primary | ICD-10-CM

## 2024-10-18 LAB
INTERNATIONAL NORMALIZATION RATIO, POC: 2.9
PROTHROMBIN TIME, POC: 0

## 2024-10-18 PROCEDURE — 29581 APPL MULTLAYER CMPRN SYS LEG: CPT

## 2024-10-18 PROCEDURE — 99211 OFF/OP EST MAY X REQ PHY/QHP: CPT

## 2024-10-18 PROCEDURE — 85610 PROTHROMBIN TIME: CPT

## 2024-10-18 NOTE — PROGRESS NOTES
ANTICOAGULATION SERVICE    Justin Baeza is a 59 y.o. male with PMHx significant for chronic DVT (last in Jan, 2019) who presents to clinic 10/18/2024 for anticoagulation monitoring and adjustment.  Patient has been taking warfarin for 15+ years.     Anticoagulation Indication(s):  DVT    Referring Physician:  Dr. Orozco - needs to establish with new PCP   Goal INR Range:  2-3  Duration of Anticoagulation Therapy:  Indefinite  Time of day dose taken:  AM  Product patient has at home:  warfarin 5 mg (peach)    INR Summary                            Warfarin regimen (mg)  Date INR   A/P    Sun Mon Tue Wed Thu Fri Sat Mg/wk  10/18 2.9 At goal, continue   7.5 5 7.5 7.5 7.5 5 7.5 47.5  8/15 2.7 At goal, continue   7.5 5 7.5 7.5 7.5 5 7.5 47.5  7/3 2.4 At goal, continue   7.5 5 7.5 7.5 7.5 5 7.5 47.5  3/14 2.6 At goal, continue   7.5 5 7.5 7.5 7.5 5 7.5 47.5  2/12 2.2 At goal, continue   7.5 5 7.5 7.5 7.5 5 7.5 47.5  2/8 1.82 Per [facility]   2/5 3.9 Above goal, hold   7.5 5 0/7.5 5/7.5 7.5 5 7.5 47.5  1/17 3.4 Above goal, continue  7.5 5 7.5 7.5 7.5 5 7.5 47.5  11/22 2.7 At goal, continue  7.5 5 7.5 7.5 7.5 5 7.5 47.5  10/11 2.9 At goal, continue  7.5 5 7.5 7.5 7.5 5 7.5 47.5  8/28 2.7  At goal, continue  7.5 5 7.5 7.5 7.5 5 7.5 47.5  8/2 2.37 Per lab    7/7 2.4  At goal, continue  7.5 5 7.5 7.5 7.5 5 7.5 47.5  5/26 2.7  At goal, continue  7.5 5 7.5 7.5 7.5 5 7.5 47.5  4/28 3.2 Above goal, continue  7.5 5 7.5 7.5 7.5 5 7.5 47.5  2/16 2.9 At goal, continue  7.5 5 7.5 7.5 7.5 5 7.5 47.5  1/9 2.9 At goal, continue  7.5 5 7.5 7.5 7.5 5 7.5 47.5  12/5 2.5 At goal, continue  7.5 5 7.5 7.5 7.5 5 7.5 47.5  11/7 2.3 At goal, continue  7.5 5 7.5 7.5 7.5 5 7.5 47.5  10/10 2.9 At goal, continue  7.5 5 7.5 7.5 7.5 5 7.5 47.5  7/27 3.1 Above goal, continue    7.5 5 7.5 7.5 7.5 5 7.5 47.5  7/6 3.1 Above goal, decrease  7.5 5 7.5 7.5 7.5 5 7.5 47.5  6/22 2.4 At goal, resume prv dose 7.5 7.5 7.5 7.5 7.5 5 7.5 50  6/1 2.6 At goal, continue

## 2024-10-18 NOTE — PATIENT INSTRUCTIONS
Wound Care Center Physician Orders and Discharge Instructions  [facility]  [address] [facility]. [address] [place] [address]  Telephone: [phone]      FAX [phone]    NAME:  Justin Baeza  YOB: 1965  MEDICAL RECORD NUMBER:  [id number]  DATE:  10/18/2024      Wound care:  Continue to follow the instructions and recommendations from your last doctor visit.  The dressing(s) applied is the same as your last visit. Please refer to your last discharge instruction for the information on your wound care.      If there were any changes made, please follow the instructions as written here: none    Future Appointments     Future Appointments   Date Time Provider Department Center   10/21/2024  8:00 AM Khang Giles MD [facility] WOUND [facility]   11/25/2024  9:30 AM [facility] MEDICATION MANAGEMENT [facility]           Your nurse  is:  Ailin     Electronically signed by Kathy Gomez RN on 10/18/2024 at 1:37 PM     Wound Care Center Information: Should you experience any significant changes in your wound(s) or have questions about your wound care, please contact the [facility] Center at [phone]. Our hours vary so please leave a message. Please give us 24-48 hours to return your call.   If you need help with your wounds and cannot wait until we are available, contact your PCP or go to the hospital emergency room.       Physician orders by:  Dr. Giles        The information contained in the After Visit Summary has been reviewed with me, the patient and/or responsible adult, by my health care provider(s).  I had the opportunity to ask questions regarding this information.  I have elected to receive;      [] Patient unable to sign Discharge Instructions. Given to ECF/Transportation/POA

## 2024-10-18 NOTE — PROGRESS NOTES
Multilayer Compression Wrap   (Not Unna) Below the Knee    NAME:  Justin Baeza  YOB: 1965  MEDICAL RECORD NUMBER:  [id number]  DATE:  10/18/2024       Removed old Multilayer wrap if present and washed leg with mild soap/water.   Applied moisturizing agent to dry skin as needed.    Applied primary and secondary dressing as ordered    Applied multilayered dressing below the knee to Right lower leg(s)  (4 Layer Compression Wrap ) .    Instructed patient/caregiver not to remove dressing and to keep it clean and dry.    Instructed patient/caregiver on complications to report to provider, such as pain, numbness in toes, heavy drainage, and slippage of dressing.    Instructed patient on purpose of compression dressing and on activity and exercise recommendations.   Applied per   Guidelines    Electronically signed by Kathy Gomez RN on 10/18/2024 at 1:30 PM

## 2024-10-21 ENCOUNTER — HOSPITAL ENCOUNTER (OUTPATIENT)
Dept: WOUND CARE | Age: 59
Discharge: HOME | End: 2024-10-21
Attending: SPECIALIST
Payer: COMMERCIAL

## 2024-10-21 VITALS
DIASTOLIC BLOOD PRESSURE: 70 MMHG | RESPIRATION RATE: 16 BRPM | HEART RATE: 73 BPM | TEMPERATURE: 96.5 F | SYSTOLIC BLOOD PRESSURE: 161 MMHG

## 2024-10-21 DIAGNOSIS — Z91.199 NONCOMPLIANCE: ICD-10-CM

## 2024-10-21 DIAGNOSIS — I87.331 IDIOPATHIC CHRONIC VENOUS HYPERTENSION OF RIGHT LOWER EXTREMITY WITH ULCER AND INFLAMMATION (HCC): ICD-10-CM

## 2024-10-21 DIAGNOSIS — I83.009 VENOUS ULCER (HCC): ICD-10-CM

## 2024-10-21 DIAGNOSIS — L97.909 VENOUS ULCER (HCC): ICD-10-CM

## 2024-10-21 DIAGNOSIS — I89.0 LYMPHEDEMA: ICD-10-CM

## 2024-10-21 DIAGNOSIS — I89.0 CHRONIC ACQUIRED LYMPHEDEMA: Primary | ICD-10-CM

## 2024-10-21 PROCEDURE — 11042 DBRDMT SUBQ TIS 1ST 20SQCM/<: CPT | Performed by: SPECIALIST

## 2024-10-21 PROCEDURE — 6370000000 HC RX 637 (ALT 250 FOR IP): Performed by: SPECIALIST

## 2024-10-21 PROCEDURE — 11045 DBRDMT SUBQ TISS EACH ADDL: CPT

## 2024-10-21 PROCEDURE — 11042 DBRDMT SUBQ TIS 1ST 20SQCM/<: CPT

## 2024-10-21 PROCEDURE — 11045 DBRDMT SUBQ TISS EACH ADDL: CPT | Performed by: SPECIALIST

## 2024-10-21 PROCEDURE — 29581 APPL MULTLAYER CMPRN SYS LEG: CPT

## 2024-10-21 RX ORDER — TRIAMCINOLONE ACETONIDE 1 MG/G
OINTMENT TOPICAL ONCE
OUTPATIENT
Start: 2024-10-21 | End: 2024-10-21

## 2024-10-21 RX ORDER — LIDOCAINE HYDROCHLORIDE 20 MG/ML
JELLY TOPICAL ONCE
OUTPATIENT
Start: 2024-10-21 | End: 2024-10-21

## 2024-10-21 RX ORDER — LIDOCAINE HYDROCHLORIDE 40 MG/ML
SOLUTION TOPICAL ONCE
Status: COMPLETED | OUTPATIENT
Start: 2024-10-21 | End: 2024-10-21

## 2024-10-21 RX ORDER — GENTAMICIN SULFATE 1 MG/G
OINTMENT TOPICAL ONCE
OUTPATIENT
Start: 2024-10-21 | End: 2024-10-21

## 2024-10-21 RX ORDER — SODIUM CHLOR/HYPOCHLOROUS ACID 0.033 %
SOLUTION, IRRIGATION IRRIGATION ONCE
OUTPATIENT
Start: 2024-10-21 | End: 2024-10-21

## 2024-10-21 RX ORDER — LIDOCAINE HYDROCHLORIDE 40 MG/ML
SOLUTION TOPICAL ONCE
OUTPATIENT
Start: 2024-10-21 | End: 2024-10-21

## 2024-10-21 RX ORDER — CLOBETASOL PROPIONATE 0.5 MG/G
OINTMENT TOPICAL ONCE
OUTPATIENT
Start: 2024-10-21 | End: 2024-10-21

## 2024-10-21 RX ORDER — SILVER SULFADIAZINE 10 MG/G
CREAM TOPICAL ONCE
OUTPATIENT
Start: 2024-10-21 | End: 2024-10-21

## 2024-10-21 RX ORDER — LIDOCAINE 50 MG/G
OINTMENT TOPICAL ONCE
OUTPATIENT
Start: 2024-10-21 | End: 2024-10-21

## 2024-10-21 RX ORDER — BACITRACIN ZINC AND POLYMYXIN B SULFATE 500; 1000 [USP'U]/G; [USP'U]/G
OINTMENT TOPICAL ONCE
OUTPATIENT
Start: 2024-10-21 | End: 2024-10-21

## 2024-10-21 RX ORDER — GINSENG 100 MG
CAPSULE ORAL ONCE
OUTPATIENT
Start: 2024-10-21 | End: 2024-10-21

## 2024-10-21 RX ORDER — MUPIROCIN 20 MG/G
OINTMENT TOPICAL ONCE
OUTPATIENT
Start: 2024-10-21 | End: 2024-10-21

## 2024-10-21 RX ORDER — LIDOCAINE 40 MG/G
CREAM TOPICAL ONCE
OUTPATIENT
Start: 2024-10-21 | End: 2024-10-21

## 2024-10-21 RX ORDER — NEOMYCIN/BACITRACIN/POLYMYXINB 3.5-400-5K
OINTMENT (GRAM) TOPICAL ONCE
OUTPATIENT
Start: 2024-10-21 | End: 2024-10-21

## 2024-10-21 RX ORDER — BETAMETHASONE DIPROPIONATE 0.05 %
OINTMENT (GRAM) TOPICAL ONCE
OUTPATIENT
Start: 2024-10-21 | End: 2024-10-21

## 2024-10-21 RX ADMIN — LIDOCAINE HYDROCHLORIDE: 40 SOLUTION TOPICAL at 08:12

## 2024-10-21 ASSESSMENT — PAIN DESCRIPTION - ONSET: ONSET: ON-GOING

## 2024-10-21 ASSESSMENT — PAIN DESCRIPTION - PAIN TYPE: TYPE: CHRONIC PAIN

## 2024-10-21 ASSESSMENT — PAIN SCALES - GENERAL: PAINLEVEL_OUTOF10: 3

## 2024-10-21 ASSESSMENT — PAIN DESCRIPTION - ORIENTATION: ORIENTATION: RIGHT

## 2024-10-21 ASSESSMENT — PAIN DESCRIPTION - DESCRIPTORS: DESCRIPTORS: ACHING

## 2024-10-21 ASSESSMENT — PAIN DESCRIPTION - FREQUENCY: FREQUENCY: CONTINUOUS

## 2024-10-21 ASSESSMENT — PAIN DESCRIPTION - LOCATION: LOCATION: LEG

## 2024-10-21 NOTE — PROGRESS NOTES
[facility]  Progress Note and Procedure Note      Justin Baeza  MEDICAL RECORD NUMBER:  [id number]  AGE: 59 y.o.   GENDER: male  : 1965  EPISODE DATE:  10/21/2024    Subjective:     Chief Complaint   Patient presents with    Wound Check     Right lower leg           HISTORY of PRESENT ILLNESS HPI     Justin Baeza is a 59 y.o. male who presents today for wound/ulcer evaluation.   History of Wound Context: Patient continues follow-up for chronic venous insufficiency with ulceration and lymphedema right lower extremity. He has now been off his antibiotics for several weeks. Patient now on Augmentin as ordered by Dr. Agustin from infectious disease. Patient continues to be on his feet extended periods of time during the day. Little utilization of lymphedema pumps   Wound/Ulcer Pain Timing/Severity: none  Quality of pain: N/A  Severity:  0 / 10   Modifying Factors: None  Associated Signs/Symptoms: edema and drainage    Ulcer Identification:  Ulcer Type: venous    Contributing Factors: edema, venous stasis, lymphedema, diabetes, obesity, and anticoagulation therapy    Acute Wound: N/A not an acute wound    PAST MEDICAL HISTORY        Diagnosis Date    DVT (deep venous thrombosis) (HCC)     Hx of blood clots     Pain and swelling of right lower leg        PAST SURGICAL HISTORY    Past Surgical History:   Procedure Laterality Date    BALLOON ANGIOPLASTY, ARTERY Right 2017    at [facility]    DILATATION, ESOPHAGUS      SKIN SPLIT GRAFT Right        FAMILY HISTORY    Family History   Problem Relation Age of Onset    Diabetes Mother     Heart Attack Father        SOCIAL HISTORY    Social History     Tobacco Use    Smoking status: Never    Smokeless tobacco: Never   Vaping Use    Vaping status: Never Used   Substance Use Topics    Alcohol use: No    Drug use: No       ALLERGIES    No Known Allergies    MEDICATIONS    Current Outpatient Medications on File Prior to Encounter

## 2024-10-21 NOTE — PATIENT INSTRUCTIONS
change Dressing: DO NOT CHANGE      [x] Multilayer Compression Wrap:  Type: Applied on Right lower leg(s)  4 Layer Compression Wrap    Do not get leg(s) with wrap wet.  If wraps become too tight call the center or completely remove the wrap.   Elevate leg(s) above the level of the heart when sitting.  Avoid prolonged standing in one place.   Applied in Clinic on 10/21/2024  The Goal of this therapy is to reduce edema and get into long term compression garments to control venous insufficiency, lymphedema and reduce occurrence of venous ulcers    [x] Edema Control: 30-40mmHG  Apply: Compression Stocking on the Left leg  Apply every morning immediately when getting up. Remove every night before going to bed unless instructed otherwise     Elevate leg(s) above the level of the heart for 30 minutes 4-5 times a day and/or when sitting.  Avoid prolonged standing in one place.    [x] Lymphedema Therapy:  Wear Lymphedema pumps twice a day at settings prescribed by your physician.   Avoid prolonged standing in one place.      Dietary:  Important dietary reminders:  1. Increase Protein intake (i.e. Lean meats, fish, eggs, legumes, and yogurt)  2. No added salt  3. If diabetic, follow a diabetic diet and check glucose prior to meals or as instructed by your physician.    Dietary Supplements(Take twice a day unless instructed otherwise):  [] Papito  [] 30ml ProStat [] Ensure Complete [] Ensure Max/Premier [] Expedite [] Other:    Your nurse  is:  talisha     Electronically signed by Talisha Hawkins RN on 10/21/2024 at 8:36 AM     Wound Care Center Information: Should you experience any significant changes in your wound(s) or have questions about your wound care, please contact the [facility] at [phone].   Hours of operation:  Mon:  8AM - 2PM  Tue: 11AM - 5PM  Wed: CLOSED  Thur: 8AM - 4:30PM  Fri:  8AM - 4:30PM  The office is closed on all major holidays.    Please give us 24-48 business hours

## 2024-10-21 NOTE — PROGRESS NOTES
Multilayer Compression Wrap   (Not Unna) Below the Knee    NAME:  Justin Baeza  YOB: 1965  MEDICAL RECORD NUMBER:  [id number]  DATE:  10/21/2024       Removed old Multilayer wrap if present and washed leg with mild soap/water.   Applied moisturizing agent to dry skin as needed.    Applied primary and secondary dressing as ordered    Applied multilayered dressing below the knee to Right lower leg(s)  (4 Layer Compression Wrap ) .    Instructed patient/caregiver not to remove dressing and to keep it clean and dry.    Instructed patient/caregiver on complications to report to provider, such as pain, numbness in toes, heavy drainage, and slippage of dressing.    Instructed patient on purpose of compression dressing and on activity and exercise recommendations.   Applied per   Guidelines    Electronically signed by Kathy Gomez RN on 10/21/2024 at 8:46 AM

## 2024-10-25 ENCOUNTER — HOSPITAL ENCOUNTER (OUTPATIENT)
Dept: WOUND CARE | Age: 59
Discharge: HOME | End: 2024-10-25
Attending: SPECIALIST
Payer: COMMERCIAL

## 2024-10-25 VITALS — TEMPERATURE: 97.7 F | SYSTOLIC BLOOD PRESSURE: 152 MMHG | DIASTOLIC BLOOD PRESSURE: 76 MMHG | RESPIRATION RATE: 16 BRPM

## 2024-10-25 PROCEDURE — 29581 APPL MULTLAYER CMPRN SYS LEG: CPT

## 2024-10-25 ASSESSMENT — PAIN DESCRIPTION - LOCATION: LOCATION: LEG

## 2024-10-25 ASSESSMENT — PAIN DESCRIPTION - PAIN TYPE: TYPE: CHRONIC PAIN

## 2024-10-25 ASSESSMENT — PAIN DESCRIPTION - ORIENTATION: ORIENTATION: RIGHT

## 2024-10-25 ASSESSMENT — PAIN SCALES - GENERAL: PAINLEVEL_OUTOF10: 8

## 2024-10-25 ASSESSMENT — PAIN DESCRIPTION - DESCRIPTORS: DESCRIPTORS: ACHING

## 2024-10-25 NOTE — PROGRESS NOTES
Multilayer Compression Wrap   (Not Unna) Below the Knee    NAME:  Justin Baeza  YOB: 1965  MEDICAL RECORD NUMBER:  [id number]  DATE:  10/25/2024       Removed old Multilayer wrap if present and washed leg with mild soap/water.   Applied moisturizing agent to dry skin as needed.    Applied primary and secondary dressing as ordered    Applied multilayered dressing below the knee to Right lower leg(s)  (4 Layer Compression Wrap ) .    Instructed patient/caregiver not to remove dressing and to keep it clean and dry.    Instructed patient/caregiver on complications to report to provider, such as pain, numbness in toes, heavy drainage, and slippage of dressing.    Instructed patient on purpose of compression dressing and on activity and exercise recommendations.   Applied per   Guidelines    Electronically signed by Kathy Gomez RN on 10/25/2024 at 2:22 PM

## 2024-10-25 NOTE — PATIENT INSTRUCTIONS
Wound Care Center Physician Orders and Discharge Instructions  [facility]  [address] [facility]. [address] [place] [address]  Telephone: [phone]      FAX [phone]    NAME:  Justin Baeza  YOB: 1965  MEDICAL RECORD NUMBER:  [id number]  DATE:  10/25/2024      Wound care:  Continue to follow the instructions and recommendations from your last doctor visit.  The dressing(s) applied is the same as your last visit. Please refer to your last discharge instruction for the information on your wound care.      If there were any changes made, please follow the instructions as written here: none    Future Appointments     Future Appointments   Date Time Provider Department Center   10/28/2024  8:00 AM Khang Giles MD [facility] WOUND [facility]   11/25/2024  9:30 AM [facility] MEDICATION MANAGEMENT [facility] MM [facility]           Your nurse  is:  Aiiln     Electronically signed by Edgar Nunez RN on 10/25/2024 at 12:37 PM     Wound Care Center Information: Should you experience any significant changes in your wound(s) or have questions about your wound care, please contact the [facility] Center at [phone]. Our hours vary so please leave a message. Please give us 24-48 hours to return your call.   If you need help with your wounds and cannot wait until we are available, contact your PCP or go to the hospital emergency room.       Physician orders by:  Dr. Giles        The information contained in the After Visit Summary has been reviewed with me, the patient and/or responsible adult, by my health care provider(s).  I had the opportunity to ask questions regarding this information.  I have elected to receive;      [] Patient unable to sign Discharge Instructions. Given to ECF/Transportation/POA

## 2024-10-28 ENCOUNTER — HOSPITAL ENCOUNTER (OUTPATIENT)
Dept: WOUND CARE | Age: 59
Discharge: HOME | End: 2024-10-28
Attending: SPECIALIST
Payer: COMMERCIAL

## 2024-10-28 VITALS — HEART RATE: 84 BPM | SYSTOLIC BLOOD PRESSURE: 136 MMHG | RESPIRATION RATE: 16 BRPM | DIASTOLIC BLOOD PRESSURE: 76 MMHG

## 2024-10-28 DIAGNOSIS — I89.0 LYMPHEDEMA: ICD-10-CM

## 2024-10-28 DIAGNOSIS — I83.009 VENOUS ULCER (HCC): ICD-10-CM

## 2024-10-28 DIAGNOSIS — L97.909 VENOUS ULCER (HCC): ICD-10-CM

## 2024-10-28 DIAGNOSIS — I87.331 IDIOPATHIC CHRONIC VENOUS HYPERTENSION OF RIGHT LOWER EXTREMITY WITH ULCER AND INFLAMMATION (HCC): ICD-10-CM

## 2024-10-28 DIAGNOSIS — Z91.199 NONCOMPLIANCE: ICD-10-CM

## 2024-10-28 DIAGNOSIS — I89.0 CHRONIC ACQUIRED LYMPHEDEMA: Primary | ICD-10-CM

## 2024-10-28 PROCEDURE — 11045 DBRDMT SUBQ TISS EACH ADDL: CPT | Performed by: SPECIALIST

## 2024-10-28 PROCEDURE — 11042 DBRDMT SUBQ TIS 1ST 20SQCM/<: CPT | Performed by: SPECIALIST

## 2024-10-28 PROCEDURE — 11042 DBRDMT SUBQ TIS 1ST 20SQCM/<: CPT

## 2024-10-28 PROCEDURE — 6370000000 HC RX 637 (ALT 250 FOR IP): Performed by: SPECIALIST

## 2024-10-28 PROCEDURE — 11045 DBRDMT SUBQ TISS EACH ADDL: CPT

## 2024-10-28 PROCEDURE — 29581 APPL MULTLAYER CMPRN SYS LEG: CPT

## 2024-10-28 RX ORDER — LIDOCAINE 40 MG/G
CREAM TOPICAL ONCE
OUTPATIENT
Start: 2024-10-28 | End: 2024-10-28

## 2024-10-28 RX ORDER — TRIAMCINOLONE ACETONIDE 1 MG/G
OINTMENT TOPICAL ONCE
OUTPATIENT
Start: 2024-10-28 | End: 2024-10-28

## 2024-10-28 RX ORDER — LIDOCAINE HYDROCHLORIDE 20 MG/ML
JELLY TOPICAL ONCE
OUTPATIENT
Start: 2024-10-28 | End: 2024-10-28

## 2024-10-28 RX ORDER — BACITRACIN ZINC AND POLYMYXIN B SULFATE 500; 1000 [USP'U]/G; [USP'U]/G
OINTMENT TOPICAL ONCE
OUTPATIENT
Start: 2024-10-28 | End: 2024-10-28

## 2024-10-28 RX ORDER — GENTAMICIN SULFATE 1 MG/G
OINTMENT TOPICAL ONCE
OUTPATIENT
Start: 2024-10-28 | End: 2024-10-28

## 2024-10-28 RX ORDER — BETAMETHASONE DIPROPIONATE 0.05 %
OINTMENT (GRAM) TOPICAL ONCE
OUTPATIENT
Start: 2024-10-28 | End: 2024-10-28

## 2024-10-28 RX ORDER — LIDOCAINE HYDROCHLORIDE 40 MG/ML
SOLUTION TOPICAL ONCE
OUTPATIENT
Start: 2024-10-28 | End: 2024-10-28

## 2024-10-28 RX ORDER — CLOBETASOL PROPIONATE 0.5 MG/G
OINTMENT TOPICAL ONCE
OUTPATIENT
Start: 2024-10-28 | End: 2024-10-28

## 2024-10-28 RX ORDER — LIDOCAINE HYDROCHLORIDE 40 MG/ML
SOLUTION TOPICAL ONCE
Status: COMPLETED | OUTPATIENT
Start: 2024-10-28 | End: 2024-10-28

## 2024-10-28 RX ORDER — SILVER SULFADIAZINE 10 MG/G
CREAM TOPICAL ONCE
OUTPATIENT
Start: 2024-10-28 | End: 2024-10-28

## 2024-10-28 RX ORDER — MUPIROCIN 20 MG/G
OINTMENT TOPICAL ONCE
OUTPATIENT
Start: 2024-10-28 | End: 2024-10-28

## 2024-10-28 RX ORDER — NEOMYCIN/BACITRACIN/POLYMYXINB 3.5-400-5K
OINTMENT (GRAM) TOPICAL ONCE
OUTPATIENT
Start: 2024-10-28 | End: 2024-10-28

## 2024-10-28 RX ORDER — SODIUM CHLOR/HYPOCHLOROUS ACID 0.033 %
SOLUTION, IRRIGATION IRRIGATION ONCE
OUTPATIENT
Start: 2024-10-28 | End: 2024-10-28

## 2024-10-28 RX ORDER — LIDOCAINE 50 MG/G
OINTMENT TOPICAL ONCE
OUTPATIENT
Start: 2024-10-28 | End: 2024-10-28

## 2024-10-28 RX ORDER — GINSENG 100 MG
CAPSULE ORAL ONCE
OUTPATIENT
Start: 2024-10-28 | End: 2024-10-28

## 2024-10-28 RX ADMIN — LIDOCAINE HYDROCHLORIDE: 40 SOLUTION TOPICAL at 08:27

## 2024-10-28 ASSESSMENT — PAIN DESCRIPTION - DESCRIPTORS: DESCRIPTORS: ACHING

## 2024-10-28 ASSESSMENT — PAIN DESCRIPTION - LOCATION: LOCATION: LEG

## 2024-10-28 ASSESSMENT — PAIN DESCRIPTION - PAIN TYPE: TYPE: CHRONIC PAIN

## 2024-10-28 ASSESSMENT — PAIN DESCRIPTION - ORIENTATION: ORIENTATION: RIGHT

## 2024-10-28 ASSESSMENT — PAIN SCALES - GENERAL: PAINLEVEL_OUTOF10: 2

## 2024-10-28 NOTE — PROGRESS NOTES
Multilayer Compression Wrap   (Not Unna) Below the Knee    NAME:  Justin Baeza  YOB: 1965  MEDICAL RECORD NUMBER:  [id number]  DATE:  10/28/2024       Removed old Multilayer wrap if present and washed leg with mild soap/water.   Applied moisturizing agent to dry skin as needed.    Applied primary and secondary dressing as ordered    Applied multilayered dressing below the knee to Right lower leg(s)  (4 Layer Compression Wrap ) .    Instructed patient/caregiver not to remove dressing and to keep it clean and dry.    Instructed patient/caregiver on complications to report to provider, such as pain, numbness in toes, heavy drainage, and slippage of dressing.    Instructed patient on purpose of compression dressing and on activity and exercise recommendations.   Applied per   Guidelines    Electronically signed by Kathy Gomez RN on 10/28/2024 at 8:55 AM      
Provider Department Center   11/25/2024  9:30 AM [facility] MEDICATION MANAGEMENT [facility]               Orders Placed This Encounter   Procedures    Initiate Outpatient Wound Care Protocol       Home Care Company: none    Medically necessary services for evaluation and treatment: []Skilled Nursing (using clean technique) []PT (Eval & Treat) []OT (Eval & Treat) []Social Work []Dietician []Other:      Wound care instructions:  If you smoke we ask that you refrain from smoking. Smoking inhibits wounds from healing.  When taking antibiotics take the entire prescription as ordered. Do not stop taking until medication is all gone unless otherwise instructed.   Exercise as tolerated.   Keep weight off wounds and reposition every 2 hours if applicable.  Do not get wounds wet in the bath or shower unless otherwise instructed by your physician. If your wound is on your foot or leg, you may purchase a cast bag. Please ask at the pharmacy.  Wash hands with soap and water prior to and after every dressing change.    [x]Wash wounds with: No need to wash. Leave dressing in place.    [x]Kiah wound Topical Treatments: Do not apply lotions, creams, or ointments to the skin around the wound bed unless directed as followed:   Apply around the wound: zinc paste         [x]Wound Location: right lower leg wounds  Apply Primary Dressing to wound: jumpstart  Secondary Dressing: Extra absorbant pad   Avoid contact of tape with skin if possible.  When to change Dressing: DO NOT CHANGE      [x] Multilayer Compression Wrap:  Type: Applied on Right lower leg(s)  4 Layer Compression Wrap    Do not get leg(s) with wrap wet.  If wraps become too tight call the center or completely remove the wrap.   Elevate leg(s) above the level of the heart when sitting.  Avoid prolonged standing in one place.   Applied in Clinic on 10/28/2024  The Goal of this therapy is to reduce edema and get into long term compression garments to control venous

## 2024-10-28 NOTE — PATIENT INSTRUCTIONS
Wound Care Center Physician Orders and Discharge Instructions  [facility]   [address]  Telephone: [phone] FAX [phone]  NAME:  Justin Baeza  YOB: 1965  MEDICAL RECORD NUMBER:  [id number]  DATE: 10/28/2024      Return Appointment:  Return Appointment: With Khang Giles MD  in 1 Week(s)  [x] Return Appointment for a Wound Assessment with the nurse on:    Future Appointments   Date Time Provider Department Center   11/25/2024  9:30 AM [facility] MEDICATION MANAGEMENT [facility]               Orders Placed This Encounter   Procedures    Initiate Outpatient Wound Care Protocol       Home Care Company: none    Medically necessary services for evaluation and treatment: []Skilled Nursing (using clean technique) []PT (Eval & Treat) []OT (Eval & Treat) []Social Work []Dietician []Other:      Wound care instructions:  If you smoke we ask that you refrain from smoking. Smoking inhibits wounds from healing.  When taking antibiotics take the entire prescription as ordered. Do not stop taking until medication is all gone unless otherwise instructed.   Exercise as tolerated.   Keep weight off wounds and reposition every 2 hours if applicable.  Do not get wounds wet in the bath or shower unless otherwise instructed by your physician. If your wound is on your foot or leg, you may purchase a cast bag. Please ask at the pharmacy.  Wash hands with soap and water prior to and after every dressing change.    [x]Wash wounds with: No need to wash. Leave dressing in place.    [x]Kiah wound Topical Treatments: Do not apply lotions, creams, or ointments to the skin around the wound bed unless directed as followed:   Apply around the wound: zinc paste         [x]Wound Location: right lower leg wounds  Apply Primary Dressing to wound: jumpstart  Secondary Dressing: Extra absorbant pad   Avoid contact of tape with skin if possible.  When to change Dressing:

## 2024-11-01 ENCOUNTER — HOSPITAL ENCOUNTER (OUTPATIENT)
Dept: WOUND CARE | Age: 59
Discharge: HOME | End: 2024-11-01
Attending: SPECIALIST
Payer: COMMERCIAL

## 2024-11-01 PROCEDURE — 29581 APPL MULTLAYER CMPRN SYS LEG: CPT

## 2024-11-01 NOTE — PROGRESS NOTES
Pt seen at [facility] -  Pt has fluid strikethrough R LE dressing (4 layer wrap)  Increase drainage, odor, periwound cellulitis and size of wound.    Start Augmentin 875 bid  (Covers E faecalis, Enterobacter, not Pseudomonas isolates)  F/u next week for WCC dressing change.  May need new PICC and iv antibiotics

## 2024-11-01 NOTE — PROGRESS NOTES
Multilayer Compression Wrap   (Not Unna) Below the Knee    NAME:  Justin Baeza  YOB: 1965  MEDICAL RECORD NUMBER:  [id number]  DATE:  11/1/2024       Removed old Multilayer wrap if present and washed leg with mild soap/water.   Applied moisturizing agent to dry skin as needed.    Applied primary and secondary dressing as ordered    Applied multilayered dressing below the knee to Right lower leg(s)  (4 Layer Compression Wrap ) .    Instructed patient/caregiver not to remove dressing and to keep it clean and dry.    Instructed patient/caregiver on complications to report to provider, such as pain, numbness in toes, heavy drainage, and slippage of dressing.    Instructed patient on purpose of compression dressing and on activity and exercise recommendations.   Applied per   Guidelines    Electronically signed by Mera Coelho RN on 11/1/2024 at 1:13 PM

## 2024-11-04 ENCOUNTER — HOSPITAL ENCOUNTER (OUTPATIENT)
Dept: WOUND CARE | Age: 59
Discharge: HOME | End: 2024-11-04
Attending: SPECIALIST
Payer: COMMERCIAL

## 2024-11-04 VITALS
DIASTOLIC BLOOD PRESSURE: 79 MMHG | HEART RATE: 74 BPM | SYSTOLIC BLOOD PRESSURE: 137 MMHG | RESPIRATION RATE: 18 BRPM | TEMPERATURE: 97.1 F

## 2024-11-04 DIAGNOSIS — I87.331 IDIOPATHIC CHRONIC VENOUS HYPERTENSION OF RIGHT LOWER EXTREMITY WITH ULCER AND INFLAMMATION (HCC): ICD-10-CM

## 2024-11-04 DIAGNOSIS — L97.909 VENOUS ULCER (HCC): ICD-10-CM

## 2024-11-04 DIAGNOSIS — I89.0 LYMPHEDEMA: ICD-10-CM

## 2024-11-04 DIAGNOSIS — I83.009 VENOUS ULCER (HCC): ICD-10-CM

## 2024-11-04 DIAGNOSIS — I89.0 CHRONIC ACQUIRED LYMPHEDEMA: Primary | ICD-10-CM

## 2024-11-04 DIAGNOSIS — Z91.199 NONCOMPLIANCE: ICD-10-CM

## 2024-11-04 PROCEDURE — 11045 DBRDMT SUBQ TISS EACH ADDL: CPT

## 2024-11-04 PROCEDURE — 11042 DBRDMT SUBQ TIS 1ST 20SQCM/<: CPT

## 2024-11-04 PROCEDURE — 11046 DBRDMT MUSC&/FSCA EA ADDL: CPT | Performed by: SPECIALIST

## 2024-11-04 PROCEDURE — 29581 APPL MULTLAYER CMPRN SYS LEG: CPT

## 2024-11-04 PROCEDURE — 11043 DBRDMT MUSC&/FSCA 1ST 20/<: CPT | Performed by: SPECIALIST

## 2024-11-04 RX ORDER — LIDOCAINE 40 MG/G
CREAM TOPICAL ONCE
Status: COMPLETED | OUTPATIENT
Start: 2024-11-04 | End: 2024-11-04

## 2024-11-04 RX ORDER — TRIAMCINOLONE ACETONIDE 1 MG/G
OINTMENT TOPICAL ONCE
OUTPATIENT
Start: 2024-11-04 | End: 2024-11-04

## 2024-11-04 RX ORDER — GENTAMICIN SULFATE 1 MG/G
OINTMENT TOPICAL ONCE
OUTPATIENT
Start: 2024-11-04 | End: 2024-11-04

## 2024-11-04 RX ORDER — BACITRACIN ZINC AND POLYMYXIN B SULFATE 500; 1000 [USP'U]/G; [USP'U]/G
OINTMENT TOPICAL ONCE
OUTPATIENT
Start: 2024-11-04 | End: 2024-11-04

## 2024-11-04 RX ORDER — LIDOCAINE 50 MG/G
OINTMENT TOPICAL ONCE
OUTPATIENT
Start: 2024-11-04 | End: 2024-11-04

## 2024-11-04 RX ORDER — LIDOCAINE HYDROCHLORIDE 20 MG/ML
JELLY TOPICAL ONCE
OUTPATIENT
Start: 2024-11-04 | End: 2024-11-04

## 2024-11-04 RX ORDER — CLOBETASOL PROPIONATE 0.5 MG/G
OINTMENT TOPICAL ONCE
OUTPATIENT
Start: 2024-11-04 | End: 2024-11-04

## 2024-11-04 RX ORDER — NEOMYCIN/BACITRACIN/POLYMYXINB 3.5-400-5K
OINTMENT (GRAM) TOPICAL ONCE
OUTPATIENT
Start: 2024-11-04 | End: 2024-11-04

## 2024-11-04 RX ORDER — SILVER SULFADIAZINE 10 MG/G
CREAM TOPICAL ONCE
OUTPATIENT
Start: 2024-11-04 | End: 2024-11-04

## 2024-11-04 RX ORDER — LIDOCAINE HYDROCHLORIDE 40 MG/ML
SOLUTION TOPICAL ONCE
OUTPATIENT
Start: 2024-11-04 | End: 2024-11-04

## 2024-11-04 RX ORDER — BETAMETHASONE DIPROPIONATE 0.05 %
OINTMENT (GRAM) TOPICAL ONCE
OUTPATIENT
Start: 2024-11-04 | End: 2024-11-04

## 2024-11-04 RX ORDER — MUPIROCIN 20 MG/G
OINTMENT TOPICAL ONCE
OUTPATIENT
Start: 2024-11-04 | End: 2024-11-04

## 2024-11-04 RX ORDER — GINSENG 100 MG
CAPSULE ORAL ONCE
OUTPATIENT
Start: 2024-11-04 | End: 2024-11-04

## 2024-11-04 RX ORDER — SODIUM CHLOR/HYPOCHLOROUS ACID 0.033 %
SOLUTION, IRRIGATION IRRIGATION ONCE
OUTPATIENT
Start: 2024-11-04 | End: 2024-11-04

## 2024-11-04 RX ORDER — LIDOCAINE 40 MG/G
CREAM TOPICAL ONCE
OUTPATIENT
Start: 2024-11-04 | End: 2024-11-04

## 2024-11-04 RX ADMIN — LIDOCAINE: 40 CREAM TOPICAL at 08:11

## 2024-11-04 ASSESSMENT — PAIN SCALES - GENERAL: PAINLEVEL_OUTOF10: 3

## 2024-11-04 ASSESSMENT — PAIN DESCRIPTION - ORIENTATION: ORIENTATION: LEFT

## 2024-11-04 ASSESSMENT — PAIN DESCRIPTION - PAIN TYPE: TYPE: CHRONIC PAIN

## 2024-11-04 ASSESSMENT — PAIN DESCRIPTION - LOCATION: LOCATION: LEG

## 2024-11-04 ASSESSMENT — PAIN DESCRIPTION - FREQUENCY: FREQUENCY: CONTINUOUS

## 2024-11-04 ASSESSMENT — PAIN DESCRIPTION - DESCRIPTORS: DESCRIPTORS: BURNING

## 2024-11-04 NOTE — PATIENT INSTRUCTIONS
Wound Care Center Physician Orders and Discharge Instructions  [facility]   [address]  Telephone: [phone] FAX [phone]  NAME:  Justin Baeza  YOB: 1965  MEDICAL RECORD NUMBER:  [id number]  DATE: 11/4/2024      Return Appointment:  Return Appointment: With Khang Giles MD  in 1 Week(s)  [x] Return Appointment for a Wound Assessment with the nurse on:11/8/24    Future Appointments   Date Time Provider Department Center   11/25/2024  9:30 AM [facility] MEDICATION MANAGEMENT [facility]               Orders Placed This Encounter   Procedures    Initiate Outpatient Wound Care Protocol       Home Care Company: none    Medically necessary services for evaluation and treatment: []Skilled Nursing (using clean technique) []PT (Eval & Treat) []OT (Eval & Treat) []Social Work []Dietician []Other:      Wound care instructions:  If you smoke we ask that you refrain from smoking. Smoking inhibits wounds from healing.  When taking antibiotics take the entire prescription as ordered. Do not stop taking until medication is all gone unless otherwise instructed.   Exercise as tolerated.   Keep weight off wounds and reposition every 2 hours if applicable.  Do not get wounds wet in the bath or shower unless otherwise instructed by your physician. If your wound is on your foot or leg, you may purchase a cast bag. Please ask at the pharmacy.  Wash hands with soap and water prior to and after every dressing change.    [x]Wash wounds with: No need to wash. Leave dressing in place.    [x]Kiah wound Topical Treatments: Do not apply lotions, creams, or ointments to the skin around the wound bed unless directed as followed:   Apply around the wound: zinc paste         [x]Wound Location: right lower leg wounds  Apply Primary Dressing to wound: polymem ag  Secondary Dressing: Extra absorbant pad   Avoid contact of tape with skin if possible.  When to change

## 2024-11-04 NOTE — PROGRESS NOTES
Multilayer Compression Wrap   (Not Unna) Below the Knee    NAME:  Justin Baeza  YOB: 1965  MEDICAL RECORD NUMBER:  [id number]  DATE:  11/4/2024       Removed old Multilayer wrap if present and washed leg with mild soap/water.   Applied moisturizing agent to dry skin as needed.    Applied primary and secondary dressing as ordered    Applied multilayered dressing below the knee to Right lower leg(s)  (4 Layer Compression Wrap ) .    Instructed patient/caregiver not to remove dressing and to keep it clean and dry.    Instructed patient/caregiver on complications to report to provider, such as pain, numbness in toes, heavy drainage, and slippage of dressing.    Instructed patient on purpose of compression dressing and on activity and exercise recommendations.   Applied per   Guidelines    Electronically signed by Mera Coelho RN on 11/4/2024 at 9:11 AM    
11/4/2024  The Goal of this therapy is to reduce edema and get into long term compression garments to control venous insufficiency, lymphedema and reduce occurrence of venous ulcers    [x] Edema Control: 30-40mmHG  Apply: Compression Stocking on the Left leg  Apply every morning immediately when getting up. Remove every night before going to bed unless instructed otherwise     Elevate leg(s) above the level of the heart for 30 minutes 4-5 times a day and/or when sitting.  Avoid prolonged standing in one place.    [x] Lymphedema Therapy:  Wear Lymphedema pumps twice a day at settings prescribed by your physician.   Avoid prolonged standing in one place.      Dietary:  Important dietary reminders:  1. Increase Protein intake (i.e. Lean meats, fish, eggs, legumes, and yogurt)  2. No added salt  3. If diabetic, follow a diabetic diet and check glucose prior to meals or as instructed by your physician.    Dietary Supplements(Take twice a day unless instructed otherwise):  [] Papito  [] 30ml ProStat [] Ensure Complete [] Ensure Max/Premier [] Expedite [] Other:    Your nurse  is:  talisha     Electronically signed by Talisha Hawkins RN on 11/4/2024 at 8:43 AM     Wound Care Center Information: Should you experience any significant changes in your wound(s) or have questions about your wound care, please contact the [facility] Center at [phone].   Hours of operation:  Mon:  8AM - 2PM  Tue: 11AM - 5PM  Wed: CLOSED  Thur: 8AM - 4:30PM  Fri:  8AM - 4:30PM  The office is closed on all major holidays.    Please give us 24-48 business hours to return your call.  These hours of operation are subject to change. If you need help with your wounds and cannot wait until we are available, contact your PCP or go to your preferred emergency room.     Call your doctor now or seek immediate medical care if:    You have symptoms of infection, such as:  Increased pain, swelling, warmth, or redness.  Red streaks

## 2024-11-08 ENCOUNTER — HOSPITAL ENCOUNTER (OUTPATIENT)
Dept: WOUND CARE | Age: 59
Discharge: HOME | End: 2024-11-08
Attending: SPECIALIST
Payer: COMMERCIAL

## 2024-11-08 VITALS
DIASTOLIC BLOOD PRESSURE: 65 MMHG | SYSTOLIC BLOOD PRESSURE: 124 MMHG | RESPIRATION RATE: 18 BRPM | TEMPERATURE: 97 F | HEART RATE: 70 BPM

## 2024-11-08 PROCEDURE — 29581 APPL MULTLAYER CMPRN SYS LEG: CPT

## 2024-11-08 ASSESSMENT — PAIN DESCRIPTION - PAIN TYPE: TYPE: CHRONIC PAIN

## 2024-11-08 ASSESSMENT — PAIN DESCRIPTION - ORIENTATION: ORIENTATION: LEFT

## 2024-11-08 ASSESSMENT — PAIN DESCRIPTION - LOCATION: LOCATION: LEG

## 2024-11-08 ASSESSMENT — PAIN SCALES - GENERAL: PAINLEVEL_OUTOF10: 3

## 2024-11-08 ASSESSMENT — PAIN DESCRIPTION - DESCRIPTORS: DESCRIPTORS: ACHING

## 2024-11-08 NOTE — PROGRESS NOTES
Multilayer Compression Wrap   (Not Unna) Below the Knee    NAME:  Justin Baeza  YOB: 1965  MEDICAL RECORD NUMBER:  [id number]  DATE:  11/8/2024       Removed old Multilayer wrap if present and washed leg with mild soap/water.   Applied moisturizing agent to dry skin as needed.    Applied primary and secondary dressing as ordered    Applied multilayered dressing below the knee to Right lower leg(s)  (4 Layer Compression Wrap ) .    Instructed patient/caregiver not to remove dressing and to keep it clean and dry.    Instructed patient/caregiver on complications to report to provider, such as pain, numbness in toes, heavy drainage, and slippage of dressing.    Instructed patient on purpose of compression dressing and on activity and exercise recommendations.   Applied per   Guidelines    Electronically signed by Mera Coelho RN on 11/8/2024 at 12:18 PM

## 2024-11-08 NOTE — PATIENT INSTRUCTIONS
Wound Care Center Physician Orders and Discharge Instructions  [facility]  [address] [facility]. [address] [place] [address]  Telephone: [phone]      FAX [phone]    NAME:  Justin Baeza  YOB: 1965  MEDICAL RECORD NUMBER:  [id number]  DATE:  11/8/2024      Wound care:  Continue to follow the instructions and recommendations from your last doctor visit.  The dressing(s) applied is the same as your last visit. Please refer to your last discharge instruction for the information on your wound care.      If there were any changes made, please follow the instructions as written here:       Future Appointments     Future Appointments   Date Time Provider Department Center   11/11/2024  8:00 AM Khang Giles MD [facility] WOUND [facility]   11/25/2024  9:30 AM [facility] MEDICATION MANAGEMENT [facility]           Your nurse  is:  Yesenia     Electronically signed by Mera Coelho RN on 11/8/2024 at 12:20 PM     Wound Care Center Information: Should you experience any significant changes in your wound(s) or have questions about your wound care, please contact the [facility] Center at [phone]. Our hours vary so please leave a message. Please give us 24-48 hours to return your call.   If you need help with your wounds and cannot wait until we are available, contact your PCP or go to the hospital emergency room.       Physician orders by:  Dr. Giles        The information contained in the After Visit Summary has been reviewed with me, the patient and/or responsible adult, by my health care provider(s).  I had the opportunity to ask questions regarding this information.  I have elected to receive;      [] Patient unable to sign Discharge Instructions. Given to ECF/Transportation/POA

## 2024-11-11 ENCOUNTER — HOSPITAL ENCOUNTER (OUTPATIENT)
Dept: WOUND CARE | Age: 59
Discharge: HOME | End: 2024-11-11
Attending: SPECIALIST
Payer: COMMERCIAL

## 2024-11-11 VITALS
DIASTOLIC BLOOD PRESSURE: 88 MMHG | SYSTOLIC BLOOD PRESSURE: 138 MMHG | RESPIRATION RATE: 16 BRPM | TEMPERATURE: 96.7 F | HEART RATE: 75 BPM

## 2024-11-11 DIAGNOSIS — I89.0 LYMPHEDEMA: ICD-10-CM

## 2024-11-11 DIAGNOSIS — I89.0 CHRONIC ACQUIRED LYMPHEDEMA: Primary | ICD-10-CM

## 2024-11-11 DIAGNOSIS — I87.331 IDIOPATHIC CHRONIC VENOUS HYPERTENSION OF RIGHT LOWER EXTREMITY WITH ULCER AND INFLAMMATION (HCC): ICD-10-CM

## 2024-11-11 DIAGNOSIS — I83.009 VENOUS ULCER (HCC): ICD-10-CM

## 2024-11-11 DIAGNOSIS — L97.909 VENOUS ULCER (HCC): ICD-10-CM

## 2024-11-11 DIAGNOSIS — Z91.199 NONCOMPLIANCE: ICD-10-CM

## 2024-11-11 PROCEDURE — 6370000000 HC RX 637 (ALT 250 FOR IP): Performed by: SPECIALIST

## 2024-11-11 PROCEDURE — 11045 DBRDMT SUBQ TISS EACH ADDL: CPT

## 2024-11-11 PROCEDURE — 11042 DBRDMT SUBQ TIS 1ST 20SQCM/<: CPT | Performed by: SPECIALIST

## 2024-11-11 PROCEDURE — 11042 DBRDMT SUBQ TIS 1ST 20SQCM/<: CPT

## 2024-11-11 PROCEDURE — 29581 APPL MULTLAYER CMPRN SYS LEG: CPT

## 2024-11-11 PROCEDURE — 11045 DBRDMT SUBQ TISS EACH ADDL: CPT | Performed by: SPECIALIST

## 2024-11-11 RX ORDER — MUPIROCIN 20 MG/G
OINTMENT TOPICAL ONCE
OUTPATIENT
Start: 2024-11-11 | End: 2024-11-11

## 2024-11-11 RX ORDER — SILVER SULFADIAZINE 10 MG/G
CREAM TOPICAL ONCE
OUTPATIENT
Start: 2024-11-11 | End: 2024-11-11

## 2024-11-11 RX ORDER — LIDOCAINE HYDROCHLORIDE 20 MG/ML
JELLY TOPICAL ONCE
OUTPATIENT
Start: 2024-11-11 | End: 2024-11-11

## 2024-11-11 RX ORDER — NEOMYCIN/BACITRACIN/POLYMYXINB 3.5-400-5K
OINTMENT (GRAM) TOPICAL ONCE
OUTPATIENT
Start: 2024-11-11 | End: 2024-11-11

## 2024-11-11 RX ORDER — CLOBETASOL PROPIONATE 0.5 MG/G
OINTMENT TOPICAL ONCE
OUTPATIENT
Start: 2024-11-11 | End: 2024-11-11

## 2024-11-11 RX ORDER — GENTAMICIN SULFATE 1 MG/G
OINTMENT TOPICAL ONCE
OUTPATIENT
Start: 2024-11-11 | End: 2024-11-11

## 2024-11-11 RX ORDER — LIDOCAINE 50 MG/G
OINTMENT TOPICAL ONCE
OUTPATIENT
Start: 2024-11-11 | End: 2024-11-11

## 2024-11-11 RX ORDER — TRIAMCINOLONE ACETONIDE 1 MG/G
OINTMENT TOPICAL ONCE
OUTPATIENT
Start: 2024-11-11 | End: 2024-11-11

## 2024-11-11 RX ORDER — LIDOCAINE HYDROCHLORIDE 40 MG/ML
SOLUTION TOPICAL ONCE
OUTPATIENT
Start: 2024-11-11 | End: 2024-11-11

## 2024-11-11 RX ORDER — GINSENG 100 MG
CAPSULE ORAL ONCE
OUTPATIENT
Start: 2024-11-11 | End: 2024-11-11

## 2024-11-11 RX ORDER — SODIUM CHLOR/HYPOCHLOROUS ACID 0.033 %
SOLUTION, IRRIGATION IRRIGATION ONCE
OUTPATIENT
Start: 2024-11-11 | End: 2024-11-11

## 2024-11-11 RX ORDER — BACITRACIN ZINC AND POLYMYXIN B SULFATE 500; 1000 [USP'U]/G; [USP'U]/G
OINTMENT TOPICAL ONCE
OUTPATIENT
Start: 2024-11-11 | End: 2024-11-11

## 2024-11-11 RX ORDER — LIDOCAINE 40 MG/G
CREAM TOPICAL ONCE
OUTPATIENT
Start: 2024-11-11 | End: 2024-11-11

## 2024-11-11 RX ORDER — BETAMETHASONE DIPROPIONATE 0.05 %
OINTMENT (GRAM) TOPICAL ONCE
OUTPATIENT
Start: 2024-11-11 | End: 2024-11-11

## 2024-11-11 RX ORDER — LIDOCAINE HYDROCHLORIDE 40 MG/ML
SOLUTION TOPICAL ONCE
Status: COMPLETED | OUTPATIENT
Start: 2024-11-11 | End: 2024-11-11

## 2024-11-11 RX ADMIN — LIDOCAINE HYDROCHLORIDE: 40 SOLUTION TOPICAL at 08:08

## 2024-11-11 ASSESSMENT — PAIN SCALES - GENERAL: PAINLEVEL_OUTOF10: 3

## 2024-11-11 ASSESSMENT — PAIN DESCRIPTION - LOCATION: LOCATION: LEG

## 2024-11-11 ASSESSMENT — PAIN DESCRIPTION - ORIENTATION: ORIENTATION: RIGHT

## 2024-11-11 ASSESSMENT — PAIN DESCRIPTION - FREQUENCY: FREQUENCY: CONTINUOUS

## 2024-11-11 ASSESSMENT — PAIN DESCRIPTION - PAIN TYPE: TYPE: CHRONIC PAIN

## 2024-11-11 ASSESSMENT — PAIN DESCRIPTION - DESCRIPTORS: DESCRIPTORS: BURNING

## 2024-11-11 NOTE — PATIENT INSTRUCTIONS
Dressing: DO NOT CHANGE      [x] Multilayer Compression Wrap:  Type: Applied on Right lower leg(s)  4 Layer Compression Wrap    Do not get leg(s) with wrap wet.  If wraps become too tight call the center or completely remove the wrap.   Elevate leg(s) above the level of the heart when sitting.  Avoid prolonged standing in one place.   Applied in Clinic on 11/11/2024  The Goal of this therapy is to reduce edema and get into long term compression garments to control venous insufficiency, lymphedema and reduce occurrence of venous ulcers    [x] Edema Control: 30-40mmHG  Apply: Compression Stocking on the Left leg  Apply every morning immediately when getting up. Remove every night before going to bed unless instructed otherwise     Elevate leg(s) above the level of the heart for 30 minutes 4-5 times a day and/or when sitting.  Avoid prolonged standing in one place.    [x] Lymphedema Therapy:  Wear Lymphedema pumps twice a day at settings prescribed by your physician.   Avoid prolonged standing in one place.      Dietary:  Important dietary reminders:  1. Increase Protein intake (i.e. Lean meats, fish, eggs, legumes, and yogurt)  2. No added salt  3. If diabetic, follow a diabetic diet and check glucose prior to meals or as instructed by your physician.    Dietary Supplements(Take twice a day unless instructed otherwise):  [] Papito  [] 30ml ProStat [] Ensure Complete [] Ensure Max/Premier [] Expedite [] Other:    Your nurse  is:  talisha     Electronically signed by Talisha Hawkins RN on 11/11/2024 at 8:46 AM     Wound Care Center Information: Should you experience any significant changes in your wound(s) or have questions about your wound care, please contact the [facility] Care Center at [phone].   Hours of operation:  Mon:  8AM - 2PM  Tue: 11AM - 5PM  Wed: CLOSED  Thur: 8AM - 4:30PM  Fri:  8AM - 4:30PM  The office is closed on all major holidays.    Please give us 24-48 business hours to

## 2024-11-11 NOTE — PROGRESS NOTES
Multilayer Compression Wrap   (Not Unna) Below the Knee    NAME:  Justin Baeza  YOB: 1965  MEDICAL RECORD NUMBER:  [id number]  DATE:  11/11/2024       Removed old Multilayer wrap if present and washed leg with mild soap/water.   Applied moisturizing agent to dry skin as needed.    Applied primary and secondary dressing as ordered    Applied multilayered dressing below the knee to Right lower leg(s)  (4 Layer Compression Wrap ) .    Instructed patient/caregiver not to remove dressing and to keep it clean and dry.    Instructed patient/caregiver on complications to report to provider, such as pain, numbness in toes, heavy drainage, and slippage of dressing.    Instructed patient on purpose of compression dressing and on activity and exercise recommendations.   Applied per   Guidelines    Electronically signed by Kathy Gomez RN on 11/11/2024 at 8:53 AM

## 2024-11-11 NOTE — PLAN OF CARE
0844   Distance Tunneling (cm) 0 cm 10/28/24 0819   Tunneling Position ___ O'Clock 0 06/06/24 0819   Undermining Starts ___ O'Clock 0 06/06/24 0819   Undermining Ends___ O'Clock 0 06/06/24 0819   Undermining Maxium Distance (cm) 0 10/28/24 0819   Wound Assessment Pink/red;Fibrin 11/11/24 0808   Drainage Amount Large (50-75% saturated) 11/11/24 0808   Drainage Description Brown;Serosanguinous;Green 11/11/24 0808   Odor Moderate 11/11/24 0808   Kiah-wound Assessment Maceration 11/11/24 0808   Margins Defined edges 11/11/24 0808   Wound Thickness Description not for Pressure Injury Full thickness 11/11/24 0808   Number of days: 839       Wound 07/25/22 Leg Right;Posterior;Lower #3 cluster (Active)   Wound Image   11/04/24 0811   Wound Etiology Venous 09/10/24 1130   Dressing Status New dressing applied 02/08/24 0843   Wound Cleansed Cleansed with saline 11/11/24 0808   Dressing/Treatment Foam impregnated with Ag 11/11/24 0852   Wound Length (cm) 2.1 cm 11/11/24 0808   Wound Width (cm) 4.5 cm 11/11/24 0808   Wound Depth (cm) 0.1 cm 11/11/24 0808   Wound Surface Area (cm^2) 9.45 cm^2 11/11/24 0808   Change in Wound Size % (l*w) 78.52 11/11/24 0808   Wound Volume (cm^3) 0.945 cm^3 11/11/24 0808   Wound Healing % 79 11/11/24 0808   Post-Procedure Length (cm) 2.2 cm 11/11/24 0844   Post-Procedure Width (cm) 4.6 cm 11/11/24 0844   Post-Procedure Depth (cm) 0.3 cm 11/11/24 0844   Post-Procedure Surface Area (cm^2) 10.12 cm^2 11/11/24 0844   Post-Procedure Volume (cm^3) 3.036 cm^3 11/11/24 0844   Distance Tunneling (cm) 0 cm 10/28/24 0819   Tunneling Position ___ O'Clock 0 06/06/24 0819   Undermining Starts ___ O'Clock 0 06/06/24 0819   Undermining Ends___ O'Clock 0 06/06/24 0819   Undermining Maxium Distance (cm) 0 10/28/24 0819   Wound Assessment Pink/red;Fibrin 11/11/24 0808   Drainage Amount Large (50-75% saturated) 11/11/24 0808   Drainage Description Brown;Serosanguinous;Green 11/11/24 0808   Odor Moderate 11/11/24 0808

## 2024-11-11 NOTE — PROGRESS NOTES
[facility]  Progress Note and Procedure Note      Justin Baeza  MEDICAL RECORD NUMBER:  [id number]  AGE: 59 y.o.   GENDER: male  : 1965  EPISODE DATE:  2024    Subjective:     Chief Complaint   Patient presents with    Wound Check     Right lower leg           HISTORY of PRESENT ILLNESS HPI     Justin Baeza is a 59 y.o. male who presents today for wound/ulcer evaluation.   History of Wound Context: Patient continues follow-up for chronic venous insufficiency with ulceration and lymphedema right lower extremity. He has now been off his antibiotics for several weeks. Patient now on Augmentin as ordered by Dr. Agustin from infectious disease. Patient continues to be on his feet extended periods of time during the day. Little utilization of lymphedema pumps.  Once again we have been using a PolyMem with silver primary dressing   Wound/Ulcer Pain Timing/Severity: none  Quality of pain: N/A  Severity:  0 / 10   Modifying Factors: None  Associated Signs/Symptoms: edema and drainage    Ulcer Identification:  Ulcer Type: venous    Contributing Factors: edema, venous stasis, lymphedema, diabetes, obesity, and anticoagulation therapy    Acute Wound: N/A not an acute wound    PAST MEDICAL HISTORY        Diagnosis Date    DVT (deep venous thrombosis) (HCC)     Hx of blood clots     Pain and swelling of right lower leg        PAST SURGICAL HISTORY    Past Surgical History:   Procedure Laterality Date    BALLOON ANGIOPLASTY, ARTERY Right 2017    at [facility]    DILATATION, ESOPHAGUS      SKIN SPLIT GRAFT Right        FAMILY HISTORY    Family History   Problem Relation Age of Onset    Diabetes Mother     Heart Attack Father        SOCIAL HISTORY    Social History     Tobacco Use    Smoking status: Never    Smokeless tobacco: Never   Vaping Use    Vaping status: Never Used   Substance Use Topics    Alcohol use: No    Drug use: No       ALLERGIES    No Known

## 2024-11-14 ENCOUNTER — HOSPITAL ENCOUNTER (OUTPATIENT)
Dept: WOUND CARE | Age: 59
Discharge: HOME | End: 2024-11-14
Attending: SPECIALIST
Payer: COMMERCIAL

## 2024-11-14 VITALS
HEART RATE: 71 BPM | RESPIRATION RATE: 16 BRPM | DIASTOLIC BLOOD PRESSURE: 79 MMHG | SYSTOLIC BLOOD PRESSURE: 154 MMHG | TEMPERATURE: 97 F

## 2024-11-14 PROCEDURE — 29581 APPL MULTLAYER CMPRN SYS LEG: CPT

## 2024-11-14 ASSESSMENT — PAIN DESCRIPTION - LOCATION: LOCATION: LEG

## 2024-11-14 ASSESSMENT — PAIN DESCRIPTION - PAIN TYPE: TYPE: CHRONIC PAIN

## 2024-11-14 ASSESSMENT — PAIN DESCRIPTION - FREQUENCY: FREQUENCY: CONTINUOUS

## 2024-11-14 ASSESSMENT — PAIN SCALES - GENERAL: PAINLEVEL_OUTOF10: 3

## 2024-11-14 ASSESSMENT — PAIN DESCRIPTION - ORIENTATION: ORIENTATION: RIGHT

## 2024-11-14 ASSESSMENT — PAIN DESCRIPTION - DESCRIPTORS: DESCRIPTORS: BURNING

## 2024-11-14 NOTE — PATIENT INSTRUCTIONS
Wound Care Center Physician Orders and Discharge Instructions  [facility]  [address] [facility]. [address] [place] [address]  Telephone: [phone]      FAX [phone]    NAME:  Justin Baeza  YOB: 1965  MEDICAL RECORD NUMBER:  [id number]  DATE:  11/14/2024      Wound care:  Continue to follow the instructions and recommendations from your last doctor visit.  The dressing(s) applied is the same as your last visit. Please refer to your last discharge instruction for the information on your wound care.      If there were any changes made, please follow the instructions as written here: continue Augmentin    Future Appointments     Future Appointments   Date Time Provider Department Center   11/18/2024  8:00 AM Khang Giles MD [facility] WOUND [facility]   11/25/2024  9:30 AM [facility] MEDICATION MANAGEMENT [facility]           Your nurse  is:  Yesenia     Electronically signed by Kathy Gomez RN on 11/14/2024 at 8:32 AM     Wound Care Center Information: Should you experience any significant changes in your wound(s) or have questions about your wound care, please contact the [facility] Care Center at [phone]. Our hours vary so please leave a message. Please give us 24-48 hours to return your call.   If you need help with your wounds and cannot wait until we are available, contact your PCP or go to the hospital emergency room.       Physician orders by:  Dr. Giles        The information contained in the After Visit Summary has been reviewed with me, the patient and/or responsible adult, by my health care provider(s).  I had the opportunity to ask questions regarding this information.  I have elected to receive;      [] Patient unable to sign Discharge Instructions. Given to ECF/Transportation/POA

## 2024-11-18 ENCOUNTER — HOSPITAL ENCOUNTER (OUTPATIENT)
Dept: WOUND CARE | Age: 59
Discharge: HOME | End: 2024-11-18
Attending: SPECIALIST
Payer: COMMERCIAL

## 2024-11-18 VITALS
RESPIRATION RATE: 16 BRPM | TEMPERATURE: 96 F | SYSTOLIC BLOOD PRESSURE: 136 MMHG | DIASTOLIC BLOOD PRESSURE: 66 MMHG | HEART RATE: 66 BPM

## 2024-11-18 DIAGNOSIS — L97.909 VENOUS ULCER (HCC): ICD-10-CM

## 2024-11-18 DIAGNOSIS — I89.0 CHRONIC ACQUIRED LYMPHEDEMA: Primary | ICD-10-CM

## 2024-11-18 DIAGNOSIS — I87.331 IDIOPATHIC CHRONIC VENOUS HYPERTENSION OF RIGHT LOWER EXTREMITY WITH ULCER AND INFLAMMATION (HCC): ICD-10-CM

## 2024-11-18 DIAGNOSIS — I89.0 LYMPHEDEMA: ICD-10-CM

## 2024-11-18 DIAGNOSIS — Z91.199 NONCOMPLIANCE: ICD-10-CM

## 2024-11-18 DIAGNOSIS — I83.009 VENOUS ULCER (HCC): ICD-10-CM

## 2024-11-18 PROCEDURE — 11042 DBRDMT SUBQ TIS 1ST 20SQCM/<: CPT

## 2024-11-18 PROCEDURE — 11045 DBRDMT SUBQ TISS EACH ADDL: CPT | Performed by: SPECIALIST

## 2024-11-18 PROCEDURE — 6370000000 HC RX 637 (ALT 250 FOR IP): Performed by: SPECIALIST

## 2024-11-18 PROCEDURE — 11045 DBRDMT SUBQ TISS EACH ADDL: CPT

## 2024-11-18 PROCEDURE — 11042 DBRDMT SUBQ TIS 1ST 20SQCM/<: CPT | Performed by: SPECIALIST

## 2024-11-18 PROCEDURE — 29581 APPL MULTLAYER CMPRN SYS LEG: CPT

## 2024-11-18 RX ORDER — GENTAMICIN SULFATE 1 MG/G
OINTMENT TOPICAL ONCE
OUTPATIENT
Start: 2024-11-18 | End: 2024-11-18

## 2024-11-18 RX ORDER — NEOMYCIN/BACITRACIN/POLYMYXINB 3.5-400-5K
OINTMENT (GRAM) TOPICAL ONCE
OUTPATIENT
Start: 2024-11-18 | End: 2024-11-18

## 2024-11-18 RX ORDER — GINSENG 100 MG
CAPSULE ORAL ONCE
OUTPATIENT
Start: 2024-11-18 | End: 2024-11-18

## 2024-11-18 RX ORDER — TRIAMCINOLONE ACETONIDE 1 MG/G
OINTMENT TOPICAL ONCE
OUTPATIENT
Start: 2024-11-18 | End: 2024-11-18

## 2024-11-18 RX ORDER — SILVER SULFADIAZINE 10 MG/G
CREAM TOPICAL ONCE
OUTPATIENT
Start: 2024-11-18 | End: 2024-11-18

## 2024-11-18 RX ORDER — MUPIROCIN 20 MG/G
OINTMENT TOPICAL ONCE
OUTPATIENT
Start: 2024-11-18 | End: 2024-11-18

## 2024-11-18 RX ORDER — LIDOCAINE HYDROCHLORIDE 40 MG/ML
SOLUTION TOPICAL ONCE
Status: COMPLETED | OUTPATIENT
Start: 2024-11-18 | End: 2024-11-18

## 2024-11-18 RX ORDER — BACITRACIN ZINC AND POLYMYXIN B SULFATE 500; 1000 [USP'U]/G; [USP'U]/G
OINTMENT TOPICAL ONCE
OUTPATIENT
Start: 2024-11-18 | End: 2024-11-18

## 2024-11-18 RX ORDER — CLOBETASOL PROPIONATE 0.5 MG/G
OINTMENT TOPICAL ONCE
OUTPATIENT
Start: 2024-11-18 | End: 2024-11-18

## 2024-11-18 RX ORDER — LIDOCAINE HYDROCHLORIDE 20 MG/ML
JELLY TOPICAL ONCE
OUTPATIENT
Start: 2024-11-18 | End: 2024-11-18

## 2024-11-18 RX ORDER — LIDOCAINE HYDROCHLORIDE 40 MG/ML
SOLUTION TOPICAL ONCE
OUTPATIENT
Start: 2024-11-18 | End: 2024-11-18

## 2024-11-18 RX ORDER — SODIUM CHLOR/HYPOCHLOROUS ACID 0.033 %
SOLUTION, IRRIGATION IRRIGATION ONCE
OUTPATIENT
Start: 2024-11-18 | End: 2024-11-18

## 2024-11-18 RX ORDER — LIDOCAINE 40 MG/G
CREAM TOPICAL ONCE
OUTPATIENT
Start: 2024-11-18 | End: 2024-11-18

## 2024-11-18 RX ORDER — LIDOCAINE 50 MG/G
OINTMENT TOPICAL ONCE
OUTPATIENT
Start: 2024-11-18 | End: 2024-11-18

## 2024-11-18 RX ORDER — BETAMETHASONE DIPROPIONATE 0.05 %
OINTMENT (GRAM) TOPICAL ONCE
OUTPATIENT
Start: 2024-11-18 | End: 2024-11-18

## 2024-11-18 RX ADMIN — LIDOCAINE HYDROCHLORIDE: 40 SOLUTION TOPICAL at 08:25

## 2024-11-18 ASSESSMENT — PAIN SCALES - GENERAL: PAINLEVEL_OUTOF10: 3

## 2024-11-18 ASSESSMENT — PAIN DESCRIPTION - DESCRIPTORS: DESCRIPTORS: ACHING;BURNING

## 2024-11-18 ASSESSMENT — PAIN DESCRIPTION - LOCATION: LOCATION: LEG

## 2024-11-18 ASSESSMENT — PAIN DESCRIPTION - FREQUENCY: FREQUENCY: CONTINUOUS

## 2024-11-18 ASSESSMENT — PAIN DESCRIPTION - PAIN TYPE: TYPE: CHRONIC PAIN

## 2024-11-18 ASSESSMENT — PAIN DESCRIPTION - ORIENTATION: ORIENTATION: RIGHT

## 2024-11-18 NOTE — PATIENT INSTRUCTIONS
Wound Care Center Physician Orders and Discharge Instructions  [facility]   [address]  Telephone: [phone] FAX [phone]  NAME:  Justin Baeza  YOB: 1965  MEDICAL RECORD NUMBER:  [id number]  DATE: 11/18/2024      Return Appointment:  Return Appointment: With Khang Giles MD  in 1 Week(s)  [x] Return Appointment for a Wound Assessment with the nurse on:11/22/24    Future Appointments   Date Time Provider Department Center   11/25/2024  9:30 AM [facility] MEDICATION MANAGEMENT [facility]               Orders Placed This Encounter   Procedures    Initiate Outpatient Wound Care Protocol       Home Care Company: none    Medically necessary services for evaluation and treatment: []Skilled Nursing (using clean technique) []PT (Eval & Treat) []OT (Eval & Treat) []Social Work []Dietician []Other:      Wound care instructions:  If you smoke we ask that you refrain from smoking. Smoking inhibits wounds from healing.  When taking antibiotics take the entire prescription as ordered. Do not stop taking until medication is all gone unless otherwise instructed.   Exercise as tolerated.   Keep weight off wounds and reposition every 2 hours if applicable.  Do not get wounds wet in the bath or shower unless otherwise instructed by your physician. If your wound is on your foot or leg, you may purchase a cast bag. Please ask at the pharmacy.  Wash hands with soap and water prior to and after every dressing change.    [x]Wash wounds with: No need to wash. Leave dressing in place.    [x]Kiah wound Topical Treatments: Do not apply lotions, creams, or ointments to the skin around the wound bed unless directed as followed:   Apply around the wound: triad cream        [x]Wound Location: right lower leg wounds  Apply Primary Dressing to wound: polymem ag  Secondary Dressing: Extra absorbant pad   Avoid contact of tape with skin if possible.  When to change

## 2024-11-18 NOTE — PROGRESS NOTES
Multilayer Compression Wrap   (Not Unna) Below the Knee    NAME:  Justin Baeza  YOB: 1965  MEDICAL RECORD NUMBER:  [id number]  DATE:  11/18/2024       Removed old Multilayer wrap if present and washed leg with mild soap/water.   Applied moisturizing agent to dry skin as needed.      Applied primary and secondary dressing as ordered    Applied multilayered dressing below the knee to Right lower leg(s)  (4 Layer Compression Wrap ) .    Instructed patient/caregiver not to remove dressing and to keep it clean and dry.    Instructed patient/caregiver on complications to report to provider, such as pain, numbness in toes, heavy drainage, and slippage of dressing.    Instructed patient on purpose of compression dressing and on activity and exercise recommendations.   Applied per   Guidelines    Electronically signed by Kathy Gomez RN on 11/18/2024 at 9:12 AM      
leading from the area.  Pus draining from the area.  A fever.       Electronically signed by CHICO VELIZ MD on 11/18/2024 at 2:00 PM

## 2024-11-21 ENCOUNTER — HOSPITAL ENCOUNTER (OUTPATIENT)
Dept: WOUND CARE | Age: 59
Discharge: HOME | End: 2024-11-21
Attending: SPECIALIST
Payer: COMMERCIAL

## 2024-11-21 VITALS
DIASTOLIC BLOOD PRESSURE: 58 MMHG | HEART RATE: 68 BPM | SYSTOLIC BLOOD PRESSURE: 132 MMHG | RESPIRATION RATE: 16 BRPM | TEMPERATURE: 96 F

## 2024-11-21 PROCEDURE — 29581 APPL MULTLAYER CMPRN SYS LEG: CPT

## 2024-11-21 ASSESSMENT — PAIN DESCRIPTION - PAIN TYPE: TYPE: CHRONIC PAIN

## 2024-11-21 ASSESSMENT — PAIN DESCRIPTION - DESCRIPTORS: DESCRIPTORS: ACHING

## 2024-11-21 ASSESSMENT — PAIN DESCRIPTION - FREQUENCY: FREQUENCY: CONTINUOUS

## 2024-11-21 ASSESSMENT — PAIN DESCRIPTION - ORIENTATION: ORIENTATION: RIGHT

## 2024-11-21 ASSESSMENT — PAIN DESCRIPTION - LOCATION: LOCATION: LEG

## 2024-11-21 NOTE — PATIENT INSTRUCTIONS
Wound Care Center Physician Orders and Discharge Instructions  [facility]  [address] [facility]. [address] [place] [address]  Telephone: [phone]      FAX [phone]    NAME:  Justin Baeza  YOB: 1965  MEDICAL RECORD NUMBER:  [id number]  DATE:  11/21/2024      Wound care:  Continue to follow the instructions and recommendations from your last doctor visit.  The dressing(s) applied is the same as your last visit. Please refer to your last discharge instruction for the information on your wound care.      If there were any changes made, please follow the instructions as written here: none    Future Appointments     Future Appointments   Date Time Provider Department Center   11/25/2024  8:00 AM Khang Giles MD [facility] WOUND [facility]   11/25/2024  9:30 AM [facility] MEDICATION MANAGEMENT [facility]           Your nurse  is:  Yesenia     Electronically signed by Kathy Gomez RN on 11/21/2024 at 9:08 AM     Wound Care Center Information: Should you experience any significant changes in your wound(s) or have questions about your wound care, please contact the [facility] at [phone]. Our hours vary so please leave a message. Please give us 24-48 hours to return your call.   If you need help with your wounds and cannot wait until we are available, contact your PCP or go to the hospital emergency room.       Physician orders by:  Dr. Giles        The information contained in the After Visit Summary has been reviewed with me, the patient and/or responsible adult, by my health care provider(s).  I had the opportunity to ask questions regarding this information.  I have elected to receive;      [] Patient unable to sign Discharge Instructions. Given to ECF/Transportation/POA

## 2024-11-21 NOTE — PROGRESS NOTES
Multilayer Compression Wrap   (Not Unna) Below the Knee    NAME:  Justin Baeza  YOB: 1965  MEDICAL RECORD NUMBER:  [id number]  DATE:  11/21/2024       Removed old Multilayer wrap if present and washed leg with mild soap/water.   Applied moisturizing agent to dry skin as needed.    Applied primary and secondary dressing as ordered    Applied multilayered dressing below the knee to Right lower leg(s)  (4 Layer Compression Wrap ) .    Instructed patient/caregiver not to remove dressing and to keep it clean and dry.    Instructed patient/caregiver on complications to report to provider, such as pain, numbness in toes, heavy drainage, and slippage of dressing.    Instructed patient on purpose of compression dressing and on activity and exercise recommendations.   Applied per   Guidelines    Electronically signed by Kathy Gomez RN on 11/21/2024 at 9:07 AM

## 2024-11-25 ENCOUNTER — HOSPITAL ENCOUNTER (OUTPATIENT)
Dept: WOUND CARE | Age: 59
Discharge: HOME | End: 2024-11-25
Attending: SPECIALIST
Payer: COMMERCIAL

## 2024-11-25 ENCOUNTER — ANTI-COAG VISIT (OUTPATIENT)
Dept: PHARMACY | Age: 59
End: 2024-11-25
Payer: COMMERCIAL

## 2024-11-25 VITALS
SYSTOLIC BLOOD PRESSURE: 132 MMHG | DIASTOLIC BLOOD PRESSURE: 69 MMHG | HEART RATE: 81 BPM | RESPIRATION RATE: 16 BRPM | TEMPERATURE: 96.5 F

## 2024-11-25 DIAGNOSIS — I89.0 LYMPHEDEMA: ICD-10-CM

## 2024-11-25 DIAGNOSIS — I83.009 VENOUS ULCER (HCC): ICD-10-CM

## 2024-11-25 DIAGNOSIS — I89.0 CHRONIC ACQUIRED LYMPHEDEMA: Primary | ICD-10-CM

## 2024-11-25 DIAGNOSIS — I82.5Z1 CHRONIC VENOUS EMBOLISM AND THROMBOSIS OF DEEP VESSELS OF DISTAL END OF RIGHT LOWER EXTREMITY (HCC): Primary | ICD-10-CM

## 2024-11-25 DIAGNOSIS — L97.909 VENOUS ULCER (HCC): ICD-10-CM

## 2024-11-25 DIAGNOSIS — I87.331 IDIOPATHIC CHRONIC VENOUS HYPERTENSION OF RIGHT LOWER EXTREMITY WITH ULCER AND INFLAMMATION (HCC): ICD-10-CM

## 2024-11-25 DIAGNOSIS — Z91.199 NONCOMPLIANCE: ICD-10-CM

## 2024-11-25 LAB
INTERNATIONAL NORMALIZATION RATIO, POC: 2.8
PROTHROMBIN TIME, POC: 0

## 2024-11-25 PROCEDURE — 11042 DBRDMT SUBQ TIS 1ST 20SQCM/<: CPT | Performed by: SPECIALIST

## 2024-11-25 PROCEDURE — 11043 DBRDMT MUSC&/FSCA 1ST 20/<: CPT

## 2024-11-25 PROCEDURE — 99211 OFF/OP EST MAY X REQ PHY/QHP: CPT | Performed by: PHARMACIST

## 2024-11-25 PROCEDURE — 11045 DBRDMT SUBQ TISS EACH ADDL: CPT | Performed by: SPECIALIST

## 2024-11-25 PROCEDURE — 6370000000 HC RX 637 (ALT 250 FOR IP): Performed by: SPECIALIST

## 2024-11-25 PROCEDURE — 29581 APPL MULTLAYER CMPRN SYS LEG: CPT

## 2024-11-25 PROCEDURE — 85610 PROTHROMBIN TIME: CPT | Performed by: PHARMACIST

## 2024-11-25 PROCEDURE — 11042 DBRDMT SUBQ TIS 1ST 20SQCM/<: CPT

## 2024-11-25 PROCEDURE — 11045 DBRDMT SUBQ TISS EACH ADDL: CPT

## 2024-11-25 RX ORDER — LIDOCAINE HYDROCHLORIDE 40 MG/ML
SOLUTION TOPICAL ONCE
OUTPATIENT
Start: 2024-11-25 | End: 2024-11-25

## 2024-11-25 RX ORDER — MUPIROCIN 20 MG/G
OINTMENT TOPICAL ONCE
OUTPATIENT
Start: 2024-11-25 | End: 2024-11-25

## 2024-11-25 RX ORDER — BACITRACIN ZINC AND POLYMYXIN B SULFATE 500; 1000 [USP'U]/G; [USP'U]/G
OINTMENT TOPICAL ONCE
OUTPATIENT
Start: 2024-11-25 | End: 2024-11-25

## 2024-11-25 RX ORDER — GENTAMICIN SULFATE 1 MG/G
OINTMENT TOPICAL ONCE
OUTPATIENT
Start: 2024-11-25 | End: 2024-11-25

## 2024-11-25 RX ORDER — BETAMETHASONE DIPROPIONATE 0.05 %
OINTMENT (GRAM) TOPICAL ONCE
OUTPATIENT
Start: 2024-11-25 | End: 2024-11-25

## 2024-11-25 RX ORDER — LIDOCAINE HYDROCHLORIDE 20 MG/ML
JELLY TOPICAL ONCE
OUTPATIENT
Start: 2024-11-25 | End: 2024-11-25

## 2024-11-25 RX ORDER — SILVER SULFADIAZINE 10 MG/G
CREAM TOPICAL ONCE
OUTPATIENT
Start: 2024-11-25 | End: 2024-11-25

## 2024-11-25 RX ORDER — GINSENG 100 MG
CAPSULE ORAL ONCE
OUTPATIENT
Start: 2024-11-25 | End: 2024-11-25

## 2024-11-25 RX ORDER — LIDOCAINE 40 MG/G
CREAM TOPICAL ONCE
OUTPATIENT
Start: 2024-11-25 | End: 2024-11-25

## 2024-11-25 RX ORDER — NEOMYCIN/BACITRACIN/POLYMYXINB 3.5-400-5K
OINTMENT (GRAM) TOPICAL ONCE
OUTPATIENT
Start: 2024-11-25 | End: 2024-11-25

## 2024-11-25 RX ORDER — LIDOCAINE HYDROCHLORIDE 40 MG/ML
SOLUTION TOPICAL ONCE
Status: COMPLETED | OUTPATIENT
Start: 2024-11-25 | End: 2024-11-25

## 2024-11-25 RX ORDER — LIDOCAINE 50 MG/G
OINTMENT TOPICAL ONCE
OUTPATIENT
Start: 2024-11-25 | End: 2024-11-25

## 2024-11-25 RX ORDER — CLOBETASOL PROPIONATE 0.5 MG/G
OINTMENT TOPICAL ONCE
OUTPATIENT
Start: 2024-11-25 | End: 2024-11-25

## 2024-11-25 RX ORDER — TRIAMCINOLONE ACETONIDE 1 MG/G
OINTMENT TOPICAL ONCE
OUTPATIENT
Start: 2024-11-25 | End: 2024-11-25

## 2024-11-25 RX ORDER — SODIUM CHLOR/HYPOCHLOROUS ACID 0.033 %
SOLUTION, IRRIGATION IRRIGATION ONCE
OUTPATIENT
Start: 2024-11-25 | End: 2024-11-25

## 2024-11-25 RX ADMIN — LIDOCAINE HYDROCHLORIDE: 40 SOLUTION TOPICAL at 08:23

## 2024-11-25 ASSESSMENT — PAIN DESCRIPTION - LOCATION: LOCATION: LEG

## 2024-11-25 ASSESSMENT — PAIN DESCRIPTION - FREQUENCY: FREQUENCY: CONTINUOUS

## 2024-11-25 ASSESSMENT — PAIN DESCRIPTION - DESCRIPTORS: DESCRIPTORS: ACHING

## 2024-11-25 ASSESSMENT — PAIN DESCRIPTION - ONSET: ONSET: ON-GOING

## 2024-11-25 ASSESSMENT — PAIN SCALES - GENERAL: PAINLEVEL_OUTOF10: 3

## 2024-11-25 ASSESSMENT — PAIN DESCRIPTION - ORIENTATION: ORIENTATION: RIGHT

## 2024-11-25 ASSESSMENT — PAIN DESCRIPTION - PAIN TYPE: TYPE: CHRONIC PAIN

## 2024-11-25 NOTE — PROGRESS NOTES
Multilayer Compression Wrap   (Not Unna) Below the Knee    NAME:  Justin Baeza  YOB: 1965  MEDICAL RECORD NUMBER:  [id number]  DATE:  11/25/2024       Removed old Multilayer wrap if present and washed leg with mild soap/water.   Applied moisturizing agent to dry skin as needed.    Applied primary and secondary dressing as ordered    Applied multilayered dressing below the knee to Right lower leg(s)  (4 Layer Compression Wrap ) .    Instructed patient/caregiver not to remove dressing and to keep it clean and dry.    Instructed patient/caregiver on complications to report to provider, such as pain, numbness in toes, heavy drainage, and slippage of dressing.    Instructed patient on purpose of compression dressing and on activity and exercise recommendations.   Applied per   Guidelines    Electronically signed by Kathy Gomez RN on 11/25/2024 at 9:18 AM      
reduce edema and get into long term compression garments to control venous insufficiency, lymphedema and reduce occurrence of venous ulcers    [x] Edema Control: 30-40mmHG  Apply: Compression Stocking on the Left leg  Apply every morning immediately when getting up. Remove every night before going to bed unless instructed otherwise     Elevate leg(s) above the level of the heart for 30 minutes 4-5 times a day and/or when sitting.  Avoid prolonged standing in one place.    [x] Lymphedema Therapy:  Wear Lymphedema pumps twice a day at settings prescribed by your physician.   Avoid prolonged standing in one place.      Dietary:  Important dietary reminders:  1. Increase Protein intake (i.e. Lean meats, fish, eggs, legumes, and yogurt)  2. No added salt  3. If diabetic, follow a diabetic diet and check glucose prior to meals or as instructed by your physician.    Dietary Supplements(Take twice a day unless instructed otherwise):  [] Papito  [] 30ml ProStat [] Ensure Complete [] Ensure Max/Premier [] Expedite [] Other:    Your nurse  is:  talisha     Electronically signed by Talisha Hawkins RN on 11/25/2024 at 8:40 AM     Wound Care Center Information: Should you experience any significant changes in your wound(s) or have questions about your wound care, please contact the [facility] at [phone].   Hours of operation:  Mon:  8AM - 2PM  Tue: 11AM - 5PM  Wed: CLOSED  Thur: 8AM - 4:30PM  Fri:  8AM - 4:30PM  The office is closed on all major holidays.    Please give us 24-48 business hours to return your call.  These hours of operation are subject to change. If you need help with your wounds and cannot wait until we are available, contact your PCP or go to your preferred emergency room.     Call your doctor now or seek immediate medical care if:    You have symptoms of infection, such as:  Increased pain, swelling, warmth, or redness.  Red streaks leading from the area.  Pus draining from

## 2024-11-25 NOTE — PATIENT INSTRUCTIONS
us 24-48 business hours to return your call.  These hours of operation are subject to change. If you need help with your wounds and cannot wait until we are available, contact your PCP or go to your preferred emergency room.     Call your doctor now or seek immediate medical care if:    You have symptoms of infection, such as:  Increased pain, swelling, warmth, or redness.  Red streaks leading from the area.  Pus draining from the area.  A fever.

## 2024-11-25 NOTE — PROGRESS NOTES
ANTICOAGULATION SERVICE    Justin Baeza is a 59 y.o. male with PMHx significant for chronic DVT (last in Jan, 2019) who presents to clinic 11/25/2024 for anticoagulation monitoring and adjustment.  Patient has been taking warfarin for 15+ years.     Anticoagulation Indication(s):  DVT    Referring Physician:  Dr. Orozco - needs to establish with new PCP   Goal INR Range:  2-3  Duration of Anticoagulation Therapy:  Indefinite  Time of day dose taken:  AM  Product patient has at home:  warfarin 5 mg (peach)    INR Summary                            Warfarin regimen (mg)  Date INR   A/P    Sun Mon Tue Wed Thu Fri Sat Mg/wk  11/25 2.8 At goal, continue   7.5 5 7.5 7.5 7.5 5 7.5 47.5  10/18 2.9 At goal, continue   7.5 5 7.5 7.5 7.5 5 7.5 47.5  8/15 2.7 At goal, continue   7.5 5 7.5 7.5 7.5 5 7.5 47.5  7/3 2.4 At goal, continue   7.5 5 7.5 7.5 7.5 5 7.5 47.5  3/14 2.6 At goal, continue   7.5 5 7.5 7.5 7.5 5 7.5 47.5  2/12 2.2 At goal, continue   7.5 5 7.5 7.5 7.5 5 7.5 47.5  2/8 1.82 Per TJH   2/5 3.9 Above goal, hold   7.5 5 0/7.5 5/7.5 7.5 5 7.5 47.5  1/17 3.4 Above goal, continue  7.5 5 7.5 7.5 7.5 5 7.5 47.5  11/22 2.7 At goal, continue  7.5 5 7.5 7.5 7.5 5 7.5 47.5  10/11 2.9 At goal, continue  7.5 5 7.5 7.5 7.5 5 7.5 47.5  8/28 2.7  At goal, continue  7.5 5 7.5 7.5 7.5 5 7.5 47.5  8/2 2.37 Per lab    7/7 2.4  At goal, continue  7.5 5 7.5 7.5 7.5 5 7.5 47.5  5/26 2.7  At goal, continue  7.5 5 7.5 7.5 7.5 5 7.5 47.5  4/28 3.2 Above goal, continue  7.5 5 7.5 7.5 7.5 5 7.5 47.5  2/16 2.9 At goal, continue  7.5 5 7.5 7.5 7.5 5 7.5 47.5  1/9 2.9 At goal, continue  7.5 5 7.5 7.5 7.5 5 7.5 47.5  12/5 2.5 At goal, continue  7.5 5 7.5 7.5 7.5 5 7.5 47.5  11/7 2.3 At goal, continue  7.5 5 7.5 7.5 7.5 5 7.5 47.5  10/10 2.9 At goal, continue  7.5 5 7.5 7.5 7.5 5 7.5 47.5  7/27 3.1 Above goal, continue    7.5 5 7.5 7.5 7.5 5 7.5 47.5  7/6 3.1 Above goal, decrease  7.5 5 7.5 7.5 7.5 5 7.5 47.5  6/22 2.4 At goal, resume prv

## 2024-12-02 ENCOUNTER — HOSPITAL ENCOUNTER (OUTPATIENT)
Dept: WOUND CARE | Age: 59
Discharge: HOME | End: 2024-12-02
Attending: SPECIALIST
Payer: COMMERCIAL

## 2024-12-02 VITALS
HEART RATE: 80 BPM | TEMPERATURE: 96 F | RESPIRATION RATE: 16 BRPM | SYSTOLIC BLOOD PRESSURE: 141 MMHG | DIASTOLIC BLOOD PRESSURE: 79 MMHG

## 2024-12-02 DIAGNOSIS — L97.909 VENOUS ULCER (HCC): ICD-10-CM

## 2024-12-02 DIAGNOSIS — Z91.199 NONCOMPLIANCE: ICD-10-CM

## 2024-12-02 DIAGNOSIS — I87.331 IDIOPATHIC CHRONIC VENOUS HYPERTENSION OF RIGHT LOWER EXTREMITY WITH ULCER AND INFLAMMATION (HCC): ICD-10-CM

## 2024-12-02 DIAGNOSIS — I89.0 CHRONIC ACQUIRED LYMPHEDEMA: Primary | ICD-10-CM

## 2024-12-02 DIAGNOSIS — I89.0 LYMPHEDEMA: ICD-10-CM

## 2024-12-02 DIAGNOSIS — I83.009 VENOUS ULCER (HCC): ICD-10-CM

## 2024-12-02 PROCEDURE — 11045 DBRDMT SUBQ TISS EACH ADDL: CPT | Performed by: SPECIALIST

## 2024-12-02 PROCEDURE — 11042 DBRDMT SUBQ TIS 1ST 20SQCM/<: CPT

## 2024-12-02 PROCEDURE — 11042 DBRDMT SUBQ TIS 1ST 20SQCM/<: CPT | Performed by: SPECIALIST

## 2024-12-02 PROCEDURE — 29581 APPL MULTLAYER CMPRN SYS LEG: CPT

## 2024-12-02 PROCEDURE — 6370000000 HC RX 637 (ALT 250 FOR IP): Performed by: SPECIALIST

## 2024-12-02 PROCEDURE — 11045 DBRDMT SUBQ TISS EACH ADDL: CPT

## 2024-12-02 RX ORDER — LIDOCAINE 40 MG/G
CREAM TOPICAL ONCE
OUTPATIENT
Start: 2024-12-02 | End: 2024-12-02

## 2024-12-02 RX ORDER — LIDOCAINE HYDROCHLORIDE 40 MG/ML
SOLUTION TOPICAL ONCE
Status: COMPLETED | OUTPATIENT
Start: 2024-12-02 | End: 2024-12-02

## 2024-12-02 RX ORDER — CLOBETASOL PROPIONATE 0.5 MG/G
OINTMENT TOPICAL ONCE
OUTPATIENT
Start: 2024-12-02 | End: 2024-12-02

## 2024-12-02 RX ORDER — BETAMETHASONE DIPROPIONATE 0.05 %
OINTMENT (GRAM) TOPICAL ONCE
OUTPATIENT
Start: 2024-12-02 | End: 2024-12-02

## 2024-12-02 RX ORDER — SILVER SULFADIAZINE 10 MG/G
CREAM TOPICAL ONCE
OUTPATIENT
Start: 2024-12-02 | End: 2024-12-02

## 2024-12-02 RX ORDER — LIDOCAINE HYDROCHLORIDE 20 MG/ML
JELLY TOPICAL ONCE
OUTPATIENT
Start: 2024-12-02 | End: 2024-12-02

## 2024-12-02 RX ORDER — LIDOCAINE 50 MG/G
OINTMENT TOPICAL ONCE
OUTPATIENT
Start: 2024-12-02 | End: 2024-12-02

## 2024-12-02 RX ORDER — MUPIROCIN 20 MG/G
OINTMENT TOPICAL ONCE
OUTPATIENT
Start: 2024-12-02 | End: 2024-12-02

## 2024-12-02 RX ORDER — GINSENG 100 MG
CAPSULE ORAL ONCE
OUTPATIENT
Start: 2024-12-02 | End: 2024-12-02

## 2024-12-02 RX ORDER — SODIUM CHLOR/HYPOCHLOROUS ACID 0.033 %
SOLUTION, IRRIGATION IRRIGATION ONCE
OUTPATIENT
Start: 2024-12-02 | End: 2024-12-02

## 2024-12-02 RX ORDER — BACITRACIN ZINC AND POLYMYXIN B SULFATE 500; 1000 [USP'U]/G; [USP'U]/G
OINTMENT TOPICAL ONCE
OUTPATIENT
Start: 2024-12-02 | End: 2024-12-02

## 2024-12-02 RX ORDER — NEOMYCIN/BACITRACIN/POLYMYXINB 3.5-400-5K
OINTMENT (GRAM) TOPICAL ONCE
OUTPATIENT
Start: 2024-12-02 | End: 2024-12-02

## 2024-12-02 RX ORDER — TRIAMCINOLONE ACETONIDE 1 MG/G
OINTMENT TOPICAL ONCE
OUTPATIENT
Start: 2024-12-02 | End: 2024-12-02

## 2024-12-02 RX ORDER — LIDOCAINE HYDROCHLORIDE 40 MG/ML
SOLUTION TOPICAL ONCE
OUTPATIENT
Start: 2024-12-02 | End: 2024-12-02

## 2024-12-02 RX ORDER — GENTAMICIN SULFATE 1 MG/G
OINTMENT TOPICAL ONCE
OUTPATIENT
Start: 2024-12-02 | End: 2024-12-02

## 2024-12-02 RX ADMIN — LIDOCAINE HYDROCHLORIDE: 40 SOLUTION TOPICAL at 08:36

## 2024-12-02 ASSESSMENT — PAIN DESCRIPTION - DESCRIPTORS: DESCRIPTORS: ACHING

## 2024-12-02 ASSESSMENT — PAIN DESCRIPTION - LOCATION: LOCATION: LEG

## 2024-12-02 ASSESSMENT — PAIN DESCRIPTION - ORIENTATION: ORIENTATION: RIGHT

## 2024-12-02 ASSESSMENT — PAIN DESCRIPTION - PAIN TYPE: TYPE: CHRONIC PAIN

## 2024-12-02 ASSESSMENT — PAIN DESCRIPTION - FREQUENCY: FREQUENCY: CONTINUOUS

## 2024-12-02 NOTE — PATIENT INSTRUCTIONS
Wound Care Center Physician Orders and Discharge Instructions  [facility]   [address]  Telephone: [phone] FAX [phone]  NAME:  Justin Baeza  YOB: 1965  MEDICAL RECORD NUMBER:  [id number]  DATE: 12/2/2024      Return Appointment:  Return Appointment: With Khang Giles MD  in 1 Week(s)  [x] Return Appointment for a Wound Assessment with the nurse on:    Future Appointments   Date Time Provider Department Center   12/30/2024  9:30 AM [facility] MEDICATION MANAGEMENT [facility] MM [facility]               Orders Placed This Encounter   Procedures    Initiate Outpatient Wound Care Protocol       Home Care Company: none    Medically necessary services for evaluation and treatment: []Skilled Nursing (using clean technique) []PT (Eval & Treat) []OT (Eval & Treat) []Social Work []Dietician []Other:      Wound care instructions:  If you smoke we ask that you refrain from smoking. Smoking inhibits wounds from healing.  When taking antibiotics take the entire prescription as ordered. Do not stop taking until medication is all gone unless otherwise instructed.   Exercise as tolerated.   Keep weight off wounds and reposition every 2 hours if applicable.  Do not get wounds wet in the bath or shower unless otherwise instructed by your physician. If your wound is on your foot or leg, you may purchase a cast bag. Please ask at the pharmacy.  Wash hands with soap and water prior to and after every dressing change.    [x]Wash wounds with: No need to wash. Leave dressing in place.    [x]Kiah wound Topical Treatments: Do not apply lotions, creams, or ointments to the skin around the wound bed unless directed as followed:   Apply around the wound: triad cream        [x]Wound Location: right lower leg wounds  Apply Primary Dressing to wound: polymem ag  Secondary Dressing: Extra absorbant pad   Avoid contact of tape with skin if possible.  When to change Dressing:

## 2024-12-02 NOTE — PROGRESS NOTES
Multilayer Compression Wrap   (Not Unna) Below the Knee    NAME:  Justin Baeza  YOB: 1965  MEDICAL RECORD NUMBER:  [id number]  DATE:  12/2/2024       Removed old Multilayer wrap if present and washed leg with mild soap/water.   Applied moisturizing agent to dry skin as needed.    Applied primary and secondary dressing as ordered    Applied multilayered dressing below the knee to Right lower leg(s)  (4 Layer Compression Wrap ) .    Instructed patient/caregiver not to remove dressing and to keep it clean and dry.    Instructed patient/caregiver on complications to report to provider, such as pain, numbness in toes, heavy drainage, and slippage of dressing.    Instructed patient on purpose of compression dressing and on activity and exercise recommendations.   Applied per   Guidelines    Electronically signed by Mera Coelho RN on 12/2/2024 at 9:07 AM

## 2024-12-05 NOTE — PROGRESS NOTES
[facility] Wound Care Center  Progress Note and Procedure Note      Justin Baeza  MEDICAL RECORD NUMBER:  [id number]  AGE: 59 y.o.   GENDER: male  : 1965  EPISODE DATE:  2024    Subjective:     Chief Complaint   Patient presents with    Wound Check     Right lower leg           HISTORY of PRESENT ILLNESS HPI     Justin Baeza is a 59 y.o. male who presents today for wound/ulcer evaluation.   History of Wound Context: Patient continues follow-up for chronic venous insufficiency with ulceration and lymphedema right lower extremity. He has now been off his antibiotics for several weeks. Patient now on Augmentin as ordered by Dr. Agustin from infectious disease. Patient continues to be on his feet extended periods of time during the day. Little utilization of lymphedema pumps. Once again we have been using a PolyMem with silver primary dressing   Wound/Ulcer Pain Timing/Severity: none  Quality of pain: N/A  Severity:  0 / 10   Modifying Factors: None  Associated Signs/Symptoms: edema and drainage    Ulcer Identification:  Ulcer Type: venous    Contributing Factors: edema, venous stasis, lymphedema, diabetes, obesity, and anticoagulation therapy    Acute Wound: N/A not an acute wound    PAST MEDICAL HISTORY        Diagnosis Date    DVT (deep venous thrombosis) (HCC)     Hx of blood clots     Pain and swelling of right lower leg        PAST SURGICAL HISTORY    Past Surgical History:   Procedure Laterality Date    BALLOON ANGIOPLASTY, ARTERY Right 2017    at [facility]    DILATATION, ESOPHAGUS      SKIN SPLIT GRAFT Right        FAMILY HISTORY    Family History   Problem Relation Age of Onset    Diabetes Mother     Heart Attack Father        SOCIAL HISTORY    Social History     Tobacco Use    Smoking status: Never    Smokeless tobacco: Never   Vaping Use    Vaping status: Never Used   Substance Use Topics    Alcohol use: No    Drug use: No       ALLERGIES    No Known

## 2024-12-09 ENCOUNTER — HOSPITAL ENCOUNTER (OUTPATIENT)
Dept: WOUND CARE | Age: 59
Discharge: HOME | End: 2024-12-09
Attending: SPECIALIST
Payer: COMMERCIAL

## 2024-12-09 VITALS
DIASTOLIC BLOOD PRESSURE: 78 MMHG | HEART RATE: 73 BPM | TEMPERATURE: 97.5 F | RESPIRATION RATE: 16 BRPM | SYSTOLIC BLOOD PRESSURE: 132 MMHG

## 2024-12-09 DIAGNOSIS — I87.331 IDIOPATHIC CHRONIC VENOUS HYPERTENSION OF RIGHT LOWER EXTREMITY WITH ULCER AND INFLAMMATION (HCC): ICD-10-CM

## 2024-12-09 DIAGNOSIS — I83.009 VENOUS ULCER (HCC): ICD-10-CM

## 2024-12-09 DIAGNOSIS — Z91.199 NONCOMPLIANCE: ICD-10-CM

## 2024-12-09 DIAGNOSIS — L97.909 VENOUS ULCER (HCC): ICD-10-CM

## 2024-12-09 DIAGNOSIS — I89.0 CHRONIC ACQUIRED LYMPHEDEMA: Primary | ICD-10-CM

## 2024-12-09 DIAGNOSIS — I89.0 LYMPHEDEMA: ICD-10-CM

## 2024-12-09 PROCEDURE — 29581 APPL MULTLAYER CMPRN SYS LEG: CPT

## 2024-12-09 PROCEDURE — 11042 DBRDMT SUBQ TIS 1ST 20SQCM/<: CPT

## 2024-12-09 PROCEDURE — 6370000000 HC RX 637 (ALT 250 FOR IP): Performed by: SPECIALIST

## 2024-12-09 PROCEDURE — 11045 DBRDMT SUBQ TISS EACH ADDL: CPT

## 2024-12-09 PROCEDURE — 11045 DBRDMT SUBQ TISS EACH ADDL: CPT | Performed by: SPECIALIST

## 2024-12-09 PROCEDURE — 11042 DBRDMT SUBQ TIS 1ST 20SQCM/<: CPT | Performed by: SPECIALIST

## 2024-12-09 RX ORDER — BETAMETHASONE DIPROPIONATE 0.05 %
OINTMENT (GRAM) TOPICAL ONCE
OUTPATIENT
Start: 2024-12-09 | End: 2024-12-09

## 2024-12-09 RX ORDER — LIDOCAINE HYDROCHLORIDE 20 MG/ML
JELLY TOPICAL ONCE
OUTPATIENT
Start: 2024-12-09 | End: 2024-12-09

## 2024-12-09 RX ORDER — BACITRACIN ZINC AND POLYMYXIN B SULFATE 500; 1000 [USP'U]/G; [USP'U]/G
OINTMENT TOPICAL ONCE
OUTPATIENT
Start: 2024-12-09 | End: 2024-12-09

## 2024-12-09 RX ORDER — NEOMYCIN/BACITRACIN/POLYMYXINB 3.5-400-5K
OINTMENT (GRAM) TOPICAL ONCE
OUTPATIENT
Start: 2024-12-09 | End: 2024-12-09

## 2024-12-09 RX ORDER — LIDOCAINE HYDROCHLORIDE 40 MG/ML
SOLUTION TOPICAL ONCE
Status: COMPLETED | OUTPATIENT
Start: 2024-12-09 | End: 2024-12-09

## 2024-12-09 RX ORDER — SILVER SULFADIAZINE 10 MG/G
CREAM TOPICAL ONCE
OUTPATIENT
Start: 2024-12-09 | End: 2024-12-09

## 2024-12-09 RX ORDER — SODIUM CHLOR/HYPOCHLOROUS ACID 0.033 %
SOLUTION, IRRIGATION IRRIGATION ONCE
OUTPATIENT
Start: 2024-12-09 | End: 2024-12-09

## 2024-12-09 RX ORDER — GENTAMICIN SULFATE 1 MG/G
OINTMENT TOPICAL ONCE
OUTPATIENT
Start: 2024-12-09 | End: 2024-12-09

## 2024-12-09 RX ORDER — LIDOCAINE 50 MG/G
OINTMENT TOPICAL ONCE
OUTPATIENT
Start: 2024-12-09 | End: 2024-12-09

## 2024-12-09 RX ORDER — LIDOCAINE 40 MG/G
CREAM TOPICAL ONCE
OUTPATIENT
Start: 2024-12-09 | End: 2024-12-09

## 2024-12-09 RX ORDER — LIDOCAINE HYDROCHLORIDE 40 MG/ML
SOLUTION TOPICAL ONCE
OUTPATIENT
Start: 2024-12-09 | End: 2024-12-09

## 2024-12-09 RX ORDER — MUPIROCIN 20 MG/G
OINTMENT TOPICAL ONCE
OUTPATIENT
Start: 2024-12-09 | End: 2024-12-09

## 2024-12-09 RX ORDER — GINSENG 100 MG
CAPSULE ORAL ONCE
OUTPATIENT
Start: 2024-12-09 | End: 2024-12-09

## 2024-12-09 RX ORDER — TRIAMCINOLONE ACETONIDE 1 MG/G
OINTMENT TOPICAL ONCE
OUTPATIENT
Start: 2024-12-09 | End: 2024-12-09

## 2024-12-09 RX ORDER — CLOBETASOL PROPIONATE 0.5 MG/G
OINTMENT TOPICAL ONCE
OUTPATIENT
Start: 2024-12-09 | End: 2024-12-09

## 2024-12-09 RX ADMIN — LIDOCAINE HYDROCHLORIDE: 40 SOLUTION TOPICAL at 08:22

## 2024-12-09 ASSESSMENT — PAIN SCALES - GENERAL: PAINLEVEL_OUTOF10: 2

## 2024-12-09 ASSESSMENT — PAIN DESCRIPTION - DESCRIPTORS: DESCRIPTORS: ACHING

## 2024-12-09 ASSESSMENT — PAIN DESCRIPTION - FREQUENCY: FREQUENCY: CONTINUOUS

## 2024-12-09 ASSESSMENT — PAIN DESCRIPTION - LOCATION: LOCATION: LEG

## 2024-12-09 ASSESSMENT — PAIN DESCRIPTION - PAIN TYPE: TYPE: CHRONIC PAIN

## 2024-12-09 ASSESSMENT — PAIN DESCRIPTION - ORIENTATION: ORIENTATION: RIGHT

## 2024-12-09 NOTE — PROGRESS NOTES
Multilayer Compression Wrap   (Not Unna) Below the Knee    NAME:  Justin Baeza  YOB: 1965  MEDICAL RECORD NUMBER:  [id number]  DATE:  12/9/2024       Removed old Multilayer wrap if present and washed leg with mild soap/water.   Applied moisturizing agent to dry skin as needed.    Applied primary and secondary dressing as ordered    Applied multilayered dressing below the knee to Left lower leg(s)  (4 Layer Compression Wrap ) .    Instructed patient/caregiver not to remove dressing and to keep it clean and dry.    Instructed patient/caregiver on complications to report to provider, such as pain, numbness in toes, heavy drainage, and slippage of dressing.    Instructed patient on purpose of compression dressing and on activity and exercise recommendations.   Applied per   Guidelines    Electronically signed by Mera Coelho RN on 12/9/2024 at 9:02 AM

## 2024-12-09 NOTE — PATIENT INSTRUCTIONS
Wound Care Center Physician Orders and Discharge Instructions  [facility]   [address]  Telephone: [phone] FAX [phone]  NAME:  Justin Baeza  YOB: 1965  MEDICAL RECORD NUMBER:  [id number]  DATE: 12/9/2024      Return Appointment:  Return Appointment: With Khang Giles MD  in 1 Week(s)  [x] Return Appointment for a Wound Assessment with the nurse on:    Future Appointments   Date Time Provider Department Center   12/30/2024  9:30 AM [facility] MEDICATION MANAGEMENT [facility] MM [facility]               Orders Placed This Encounter   Procedures    Initiate Outpatient Wound Care Protocol       Home Care Company: none    Medically necessary services for evaluation and treatment: []Skilled Nursing (using clean technique) []PT (Eval & Treat) []OT (Eval & Treat) []Social Work []Dietician []Other:      Wound care instructions:  If you smoke we ask that you refrain from smoking. Smoking inhibits wounds from healing.  When taking antibiotics take the entire prescription as ordered. Do not stop taking until medication is all gone unless otherwise instructed.   Exercise as tolerated.   Keep weight off wounds and reposition every 2 hours if applicable.  Do not get wounds wet in the bath or shower unless otherwise instructed by your physician. If your wound is on your foot or leg, you may purchase a cast bag. Please ask at the pharmacy.  Wash hands with soap and water prior to and after every dressing change.    [x]Wash wounds with: No need to wash. Leave dressing in place.    [x]Kiah wound Topical Treatments: Do not apply lotions, creams, or ointments to the skin around the wound bed unless directed as followed:   Apply around the wound: triad cream        [x]Wound Location: right lower leg wounds  Apply Primary Dressing to wound: polymem ag  Secondary Dressing: Extra absorbant pad   Avoid contact of tape with skin if possible.  When to change Dressing:

## 2024-12-09 NOTE — PROGRESS NOTES
[facility] Wound Care Center  Progress Note and Procedure Note      Justin Baeza  MEDICAL RECORD NUMBER:  [id number]  AGE: 59 y.o.   GENDER: male  : 1965  EPISODE DATE:  2024    Subjective:     Chief Complaint   Patient presents with    Wound Check     Right lower leg         HISTORY of PRESENT ILLNESS HPI     Justin Baeza is a 59 y.o. male who presents today for wound/ulcer evaluation.   History of Wound Context: Patient continues follow-up for chronic venous insufficiency with ulceration and lymphedema right lower extremity. He has now been off his antibiotics for several weeks. Patient continues to be on his feet extended periods of time during the day. Little utilization of lymphedema pumps. Once again we have been using a PolyMem with silver primary dressing   Wound/Ulcer Pain Timing/Severity: none  Quality of pain: N/A  Severity:  0 / 10   Modifying Factors: None  Associated Signs/Symptoms: edema and drainage    Ulcer Identification:  Ulcer Type: venous    Contributing Factors: edema, venous stasis, lymphedema, diabetes, obesity, and anticoagulation therapy    Acute Wound: N/A not an acute wound    PAST MEDICAL HISTORY        Diagnosis Date    DVT (deep venous thrombosis) (HCC)     Hx of blood clots     Pain and swelling of right lower leg        PAST SURGICAL HISTORY    Past Surgical History:   Procedure Laterality Date    BALLOON ANGIOPLASTY, ARTERY Right 2017    at [facility]    DILATATION, ESOPHAGUS      SKIN SPLIT GRAFT Right        FAMILY HISTORY    Family History   Problem Relation Age of Onset    Diabetes Mother     Heart Attack Father        SOCIAL HISTORY    Social History     Tobacco Use    Smoking status: Never    Smokeless tobacco: Never   Vaping Use    Vaping status: Never Used   Substance Use Topics    Alcohol use: No    Drug use: No       ALLERGIES    No Known Allergies    MEDICATIONS    Current Outpatient Medications on File Prior to Encounter   Medication Sig

## 2024-12-16 ENCOUNTER — HOSPITAL ENCOUNTER (OUTPATIENT)
Dept: WOUND CARE | Age: 59
Discharge: HOME | End: 2024-12-16
Attending: SPECIALIST
Payer: COMMERCIAL

## 2024-12-16 VITALS — DIASTOLIC BLOOD PRESSURE: 63 MMHG | SYSTOLIC BLOOD PRESSURE: 145 MMHG | RESPIRATION RATE: 16 BRPM | TEMPERATURE: 97.3 F

## 2024-12-16 DIAGNOSIS — Z91.199 NONCOMPLIANCE: ICD-10-CM

## 2024-12-16 DIAGNOSIS — I89.0 LYMPHEDEMA: ICD-10-CM

## 2024-12-16 DIAGNOSIS — I87.331 IDIOPATHIC CHRONIC VENOUS HYPERTENSION OF RIGHT LOWER EXTREMITY WITH ULCER AND INFLAMMATION (HCC): ICD-10-CM

## 2024-12-16 DIAGNOSIS — I83.009 VENOUS ULCER (HCC): ICD-10-CM

## 2024-12-16 DIAGNOSIS — L97.909 VENOUS ULCER (HCC): ICD-10-CM

## 2024-12-16 DIAGNOSIS — I89.0 CHRONIC ACQUIRED LYMPHEDEMA: Primary | ICD-10-CM

## 2024-12-16 PROCEDURE — 11042 DBRDMT SUBQ TIS 1ST 20SQCM/<: CPT

## 2024-12-16 PROCEDURE — 11045 DBRDMT SUBQ TISS EACH ADDL: CPT | Performed by: SPECIALIST

## 2024-12-16 PROCEDURE — 11046 DBRDMT MUSC&/FSCA EA ADDL: CPT

## 2024-12-16 PROCEDURE — 6370000000 HC RX 637 (ALT 250 FOR IP): Performed by: SPECIALIST

## 2024-12-16 PROCEDURE — 11042 DBRDMT SUBQ TIS 1ST 20SQCM/<: CPT | Performed by: SPECIALIST

## 2024-12-16 PROCEDURE — 29581 APPL MULTLAYER CMPRN SYS LEG: CPT

## 2024-12-16 RX ORDER — MUPIROCIN 20 MG/G
OINTMENT TOPICAL ONCE
OUTPATIENT
Start: 2024-12-16 | End: 2024-12-16

## 2024-12-16 RX ORDER — LIDOCAINE HYDROCHLORIDE 40 MG/ML
SOLUTION TOPICAL ONCE
OUTPATIENT
Start: 2024-12-16 | End: 2024-12-16

## 2024-12-16 RX ORDER — GENTAMICIN SULFATE 1 MG/G
OINTMENT TOPICAL ONCE
OUTPATIENT
Start: 2024-12-16 | End: 2024-12-16

## 2024-12-16 RX ORDER — BETAMETHASONE DIPROPIONATE 0.05 %
OINTMENT (GRAM) TOPICAL ONCE
OUTPATIENT
Start: 2024-12-16 | End: 2024-12-16

## 2024-12-16 RX ORDER — LIDOCAINE HYDROCHLORIDE 20 MG/ML
JELLY TOPICAL ONCE
OUTPATIENT
Start: 2024-12-16 | End: 2024-12-16

## 2024-12-16 RX ORDER — LIDOCAINE 50 MG/G
OINTMENT TOPICAL ONCE
OUTPATIENT
Start: 2024-12-16 | End: 2024-12-16

## 2024-12-16 RX ORDER — CLOBETASOL PROPIONATE 0.5 MG/G
OINTMENT TOPICAL ONCE
OUTPATIENT
Start: 2024-12-16 | End: 2024-12-16

## 2024-12-16 RX ORDER — NEOMYCIN/BACITRACIN/POLYMYXINB 3.5-400-5K
OINTMENT (GRAM) TOPICAL ONCE
OUTPATIENT
Start: 2024-12-16 | End: 2024-12-16

## 2024-12-16 RX ORDER — SODIUM CHLOR/HYPOCHLOROUS ACID 0.033 %
SOLUTION, IRRIGATION IRRIGATION ONCE
OUTPATIENT
Start: 2024-12-16 | End: 2024-12-16

## 2024-12-16 RX ORDER — SILVER SULFADIAZINE 10 MG/G
CREAM TOPICAL ONCE
OUTPATIENT
Start: 2024-12-16 | End: 2024-12-16

## 2024-12-16 RX ORDER — TRIAMCINOLONE ACETONIDE 1 MG/G
OINTMENT TOPICAL ONCE
OUTPATIENT
Start: 2024-12-16 | End: 2024-12-16

## 2024-12-16 RX ORDER — LIDOCAINE 40 MG/G
CREAM TOPICAL ONCE
OUTPATIENT
Start: 2024-12-16 | End: 2024-12-16

## 2024-12-16 RX ORDER — GINSENG 100 MG
CAPSULE ORAL ONCE
OUTPATIENT
Start: 2024-12-16 | End: 2024-12-16

## 2024-12-16 RX ORDER — BACITRACIN ZINC AND POLYMYXIN B SULFATE 500; 1000 [USP'U]/G; [USP'U]/G
OINTMENT TOPICAL ONCE
OUTPATIENT
Start: 2024-12-16 | End: 2024-12-16

## 2024-12-16 RX ORDER — LIDOCAINE 40 MG/G
CREAM TOPICAL ONCE
Status: COMPLETED | OUTPATIENT
Start: 2024-12-16 | End: 2024-12-16

## 2024-12-16 RX ADMIN — LIDOCAINE: 40 CREAM TOPICAL at 08:29

## 2024-12-16 ASSESSMENT — PAIN DESCRIPTION - DESCRIPTORS: DESCRIPTORS: ACHING

## 2024-12-16 ASSESSMENT — PAIN DESCRIPTION - LOCATION: LOCATION: LEG

## 2024-12-16 ASSESSMENT — PAIN DESCRIPTION - ORIENTATION: ORIENTATION: RIGHT

## 2024-12-16 ASSESSMENT — PAIN DESCRIPTION - PAIN TYPE: TYPE: CHRONIC PAIN

## 2024-12-16 ASSESSMENT — PAIN SCALES - GENERAL
PAINLEVEL_OUTOF10: 3
PAINLEVEL_OUTOF10: 3

## 2024-12-16 ASSESSMENT — PAIN DESCRIPTION - FREQUENCY: FREQUENCY: CONTINUOUS

## 2024-12-16 ASSESSMENT — PAIN - FUNCTIONAL ASSESSMENT: PAIN_FUNCTIONAL_ASSESSMENT: PREVENTS OR INTERFERES WITH MANY ACTIVE NOT PASSIVE ACTIVITIES

## 2024-12-16 NOTE — PLAN OF CARE
Compression Garments    Supply Company for Compression Stockings:     [employer] [address] [place] [address] f: [phone] f: [phone] p: [phone] [email or web address]      Ordering Center:     [facility] [place] [address] [address]  FAX NUMBER: [phone]    Patient Information:      Justin Baeza  [address]  [facility] [address]   [phone]   : 1965  AGE: 59 y.o.     GENDER: male   TODAYS DATE:  2024    Insurance:      PRIMARY INSURANCE:  Plan: [employer] [address]  Coverage: [employer]  Effective Date: 2024  Group Number: [unfilled]  Subscriber Number: [id number] - (Commercial)    SECONDARY INSURANCE:  Plan:   Coverage:   Effective Date:   [unfilled]    [unfilled]     [unfilled]   [unfilled]     Patient Information:      Problem List Items Addressed This Visit     ICD 10: I89      Circulatory    Idiopathic chronic venous hypertension of right lower extremity with ulcer and inflammation (HCC)    Relevant Orders    Initiate Outpatient Wound Care Protocol       Other    Lymphedema    Relevant Orders    Initiate Outpatient Wound Care Protocol    Chronic acquired lymphedema - Primary    Relevant Orders    Initiate Outpatient Wound Care Protocol    Venous ulcer (HCC)    Relevant Orders    Initiate Outpatient Wound Care Protocol    Noncompliance    Relevant Orders    Initiate Outpatient Wound Care Protocol       Wound 22 Leg Right;Lower;Medial;Proximal #1 (Active)   Wound Image   24 0816   Wound Etiology Venous 24 0822   Dressing Status New dressing applied 24 0843   Wound Cleansed Irrigated with saline 24 08   Dressing/Treatment Foam impregnated with Ag 24 0913   Wound Length (cm) 1.9 cm 24 0822   Wound Width (cm) 2.4 cm 24 0822   Wound Depth (cm) 0.1 cm 24 0822   Wound Surface Area (cm^2) 4.56 cm^2

## 2024-12-16 NOTE — PROGRESS NOTES
Multilayer Compression Wrap   (Not Unna) Below the Knee    NAME:  Justin Baeza  YOB: 1965  MEDICAL RECORD NUMBER:  [id number]  DATE:  12/16/2024       Removed old Multilayer wrap if present and washed leg with mild soap/water.   Applied moisturizing agent to dry skin as needed.    Applied primary and secondary dressing as ordered    Applied multilayered dressing below the knee to Right lower leg(s)  (4 Layer Compression Wrap ) .    Instructed patient/caregiver not to remove dressing and to keep it clean and dry.    Instructed patient/caregiver on complications to report to provider, such as pain, numbness in toes, heavy drainage, and slippage of dressing.    Instructed patient on purpose of compression dressing and on activity and exercise recommendations.   Applied per   Guidelines    Electronically signed by Kathy Gomez RN on 12/16/2024 at 9:14 AM        
  Tunneling Position ___ O'Clock 0 04/18/24 0828   Undermining Starts ___ O'Clock 0 04/18/24 0828   Undermining Ends___ O'Clock 0 04/18/24 0828   Undermining Maxium Distance (cm) 0 12/16/24 0822   Wound Assessment Pink/red;Fibrin 12/16/24 0822   Drainage Amount Moderate (25-50%) 12/16/24 0822   Drainage Description Brown 12/16/24 0822   Odor Mild 12/16/24 0822   Kiah-wound Assessment Maceration 12/16/24 0822   Margins Defined edges 12/09/24 0822   Wound Thickness Description not for Pressure Injury Full thickness 12/09/24 0822   Number of days: 347          Percent of Wound Debrided: 100%    Total Surface Area Debrided:  35 sq cm    Diabetic/Pressure/Non Pressure Ulcers only:  Ulcer: Non-Pressure ulcer, fat layer exposed    Bleeding: Minimal    Hemostasis Achieved: by pressure    Procedural Pain: 0  / 10     Post Procedural Pain: 0 / 10     Response to treatment:  Well tolerated by patient.         Plan:     Treatment Note: Please see attached Discharge Instructions.  These instructions were given and signed by the patient or POA    New Prescriptions    No medications on file       Orders Placed This Encounter   Procedures    Initiate Outpatient Wound Care Protocol         Patient Instructions    Wound Care Center Physician Orders and Discharge Instructions  [facility]   [address] [place] [address] [address]  Telephone: [phone] FAX [phone]  NAME:  Justin Baeza  YOB: 1965  MEDICAL RECORD NUMBER:  [id number]  DATE: 12/16/2024      Return Appointment:  Return Appointment: With Khang Giles MD  in 1 Week(s)  [x] Return Appointment for a Wound Assessment with the nurse on:    Future Appointments   Date Time Provider Department Center   12/30/2024  9:30 AM [facility]               Orders Placed This Encounter   Procedures    Initiate Outpatient Wound Care Protocol       Home Care Company: none    Medically necessary services for

## 2024-12-16 NOTE — PATIENT INSTRUCTIONS
DO NOT CHANGE      [x] Multilayer Compression Wrap:  Type: Applied on Right lower leg(s)  4 Layer Compression Wrap- second layer of cast padding below calf     Do not get leg(s) with wrap wet.  If wraps become too tight call the center or completely remove the wrap.   Elevate leg(s) above the level of the heart when sitting.  Avoid prolonged standing in one place.   Applied in Clinic on 12/16/2024  The Goal of this therapy is to reduce edema and get into long term compression garments to control venous insufficiency, lymphedema and reduce occurrence of venous ulcers    [x] Edema Control: 30-40mmHG  Apply: Compression Stocking on the Left leg  Apply every morning immediately when getting up. Remove every night before going to bed unless instructed otherwise     Elevate leg(s) above the level of the heart for 30 minutes 4-5 times a day and/or when sitting.  Avoid prolonged standing in one place.    [x] Lymphedema Therapy:  Wear Lymphedema pumps twice a day at settings prescribed by your physician.   Avoid prolonged standing in one place.      Dietary:  Important dietary reminders:  1. Increase Protein intake (i.e. Lean meats, fish, eggs, legumes, and yogurt)  2. No added salt  3. If diabetic, follow a diabetic diet and check glucose prior to meals or as instructed by your physician.    Dietary Supplements(Take twice a day unless instructed otherwise):  [] Papito  [] 30ml ProStat [] Ensure Complete [] Ensure Max/Premier [] Expedite [] Other:    Your nurse  is:  talisha     Electronically signed by Talisha Hawkins RN on 12/16/2024 at 8:45 AM     Wound Care Center Information: Should you experience any significant changes in your wound(s) or have questions about your wound care, please contact the [facility] at [phone].   Hours of operation:  Mon:  8AM - 2PM  Tue: 11AM - 5PM  Wed: CLOSED  Thur: 8AM - 4:30PM  Fri:  8AM - 4:30PM  The office is closed on all major holidays.    Please give

## 2024-12-20 ENCOUNTER — HOSPITAL ENCOUNTER (OUTPATIENT)
Dept: WOUND CARE | Age: 59
Discharge: HOME | End: 2024-12-20
Attending: SPECIALIST
Payer: COMMERCIAL

## 2024-12-20 PROCEDURE — 29581 APPL MULTLAYER CMPRN SYS LEG: CPT

## 2024-12-20 NOTE — PROGRESS NOTES
Multilayer Compression Wrap   (Not Unna) Below the Knee    NAME:  Justin Baeza  YOB: 1965  MEDICAL RECORD NUMBER:  [id number]  DATE:  12/20/2024       Removed old Multilayer wrap if present and washed leg with mild soap/water.   Applied moisturizing agent to dry skin as needed.    Applied primary and secondary dressing as ordered    Applied multilayered dressing below the knee to Right lower leg(s)  (4 Layer Compression Wrap ) .    Instructed patient/caregiver not to remove dressing and to keep it clean and dry.    Instructed patient/caregiver on complications to report to provider, such as pain, numbness in toes, heavy drainage, and slippage of dressing.    Instructed patient on purpose of compression dressing and on activity and exercise recommendations.   Applied per   Guidelines    Electronically signed by Mera Coelho RN on 12/20/2024 at 12:43 PM

## 2024-12-20 NOTE — DISCHARGE INSTRUCTIONS
Wound Care Center Physician Orders and Discharge Instructions  [facility]  [address] [facility]. [address] [place] [id number]  Telephone: [phone]      FAX [phone]    NAME:  Justin Baeza  YOB: 1965  MEDICAL RECORD NUMBER:  [id number]  DATE:  12/20/2024      Wound care:  Continue to follow the instructions and recommendations from your last doctor visit.  The dressing(s) applied is the same as your last visit. Please refer to your last discharge instruction for the information on your wound care.      If there were any changes made, please follow the instructions as written here:     Future Appointments     Future Appointments   Date Time Provider Department Center   12/23/2024  8:00 AM Khang Giles MD [facility] WOUND [facility]   12/30/2024  9:30 AM [facility] MEDICATION MANAGEMENT [facility]           Your nurse  is:  ***     Electronically signed by Mera Coelho RN on 12/20/2024 at 12:45 PM     Wound Care Center Information: Should you experience any significant changes in your wound(s) or have questions about your wound care, please contact the [facility] Center at [phone]. Our hours vary so please leave a message. Please give us 24-48 hours to return your call.   If you need help with your wounds and cannot wait until we are available, contact your PCP or go to the hospital emergency room.       Physician orders by:  Dr. Giles        The information contained in the After Visit Summary has been reviewed with me, the patient and/or responsible adult, by my health care provider(s).  I had the opportunity to ask questions regarding this information.  I have elected to receive;      [] Patient unable to sign Discharge Instructions. Given to ECF/Transportation/POA

## 2024-12-23 ENCOUNTER — TELEPHONE (OUTPATIENT)
Dept: WOUND CARE | Age: 59
End: 2024-12-23

## 2024-12-23 ENCOUNTER — HOSPITAL ENCOUNTER (OUTPATIENT)
Dept: WOUND CARE | Age: 59
Discharge: HOME | End: 2024-12-23
Attending: SPECIALIST
Payer: COMMERCIAL

## 2024-12-23 VITALS
DIASTOLIC BLOOD PRESSURE: 83 MMHG | HEART RATE: 71 BPM | RESPIRATION RATE: 16 BRPM | TEMPERATURE: 97.3 F | SYSTOLIC BLOOD PRESSURE: 137 MMHG

## 2024-12-23 DIAGNOSIS — L97.909 VENOUS ULCER (HCC): ICD-10-CM

## 2024-12-23 DIAGNOSIS — I87.331 IDIOPATHIC CHRONIC VENOUS HYPERTENSION OF RIGHT LOWER EXTREMITY WITH ULCER AND INFLAMMATION (HCC): ICD-10-CM

## 2024-12-23 DIAGNOSIS — I89.0 CHRONIC ACQUIRED LYMPHEDEMA: Primary | ICD-10-CM

## 2024-12-23 DIAGNOSIS — I83.009 VENOUS ULCER (HCC): ICD-10-CM

## 2024-12-23 DIAGNOSIS — I89.0 LYMPHEDEMA: ICD-10-CM

## 2024-12-23 DIAGNOSIS — Z91.199 NONCOMPLIANCE: ICD-10-CM

## 2024-12-23 PROCEDURE — 6370000000 HC RX 637 (ALT 250 FOR IP): Performed by: SPECIALIST

## 2024-12-23 PROCEDURE — 87186 SC STD MICRODIL/AGAR DIL: CPT

## 2024-12-23 PROCEDURE — 11042 DBRDMT SUBQ TIS 1ST 20SQCM/<: CPT | Performed by: SPECIALIST

## 2024-12-23 PROCEDURE — 11042 DBRDMT SUBQ TIS 1ST 20SQCM/<: CPT

## 2024-12-23 PROCEDURE — 87205 SMEAR GRAM STAIN: CPT

## 2024-12-23 PROCEDURE — 87077 CULTURE AEROBIC IDENTIFY: CPT

## 2024-12-23 PROCEDURE — 11045 DBRDMT SUBQ TISS EACH ADDL: CPT

## 2024-12-23 PROCEDURE — 87070 CULTURE OTHR SPECIMN AEROBIC: CPT

## 2024-12-23 PROCEDURE — 11045 DBRDMT SUBQ TISS EACH ADDL: CPT | Performed by: SPECIALIST

## 2024-12-23 PROCEDURE — 29581 APPL MULTLAYER CMPRN SYS LEG: CPT

## 2024-12-23 RX ORDER — BACITRACIN ZINC AND POLYMYXIN B SULFATE 500; 1000 [USP'U]/G; [USP'U]/G
OINTMENT TOPICAL ONCE
OUTPATIENT
Start: 2024-12-23 | End: 2024-12-23

## 2024-12-23 RX ORDER — LIDOCAINE HYDROCHLORIDE 40 MG/ML
SOLUTION TOPICAL ONCE
OUTPATIENT
Start: 2024-12-23 | End: 2024-12-23

## 2024-12-23 RX ORDER — GINSENG 100 MG
CAPSULE ORAL ONCE
OUTPATIENT
Start: 2024-12-23 | End: 2024-12-23

## 2024-12-23 RX ORDER — LIDOCAINE HYDROCHLORIDE 20 MG/ML
JELLY TOPICAL ONCE
OUTPATIENT
Start: 2024-12-23 | End: 2024-12-23

## 2024-12-23 RX ORDER — BETAMETHASONE DIPROPIONATE 0.05 %
OINTMENT (GRAM) TOPICAL ONCE
OUTPATIENT
Start: 2024-12-23 | End: 2024-12-23

## 2024-12-23 RX ORDER — LIDOCAINE HYDROCHLORIDE 40 MG/ML
SOLUTION TOPICAL ONCE
Status: COMPLETED | OUTPATIENT
Start: 2024-12-23 | End: 2024-12-23

## 2024-12-23 RX ORDER — CLOBETASOL PROPIONATE 0.5 MG/G
OINTMENT TOPICAL ONCE
OUTPATIENT
Start: 2024-12-23 | End: 2024-12-23

## 2024-12-23 RX ORDER — SODIUM CHLOR/HYPOCHLOROUS ACID 0.033 %
SOLUTION, IRRIGATION IRRIGATION ONCE
OUTPATIENT
Start: 2024-12-23 | End: 2024-12-23

## 2024-12-23 RX ORDER — TRIAMCINOLONE ACETONIDE 1 MG/G
OINTMENT TOPICAL ONCE
OUTPATIENT
Start: 2024-12-23 | End: 2024-12-23

## 2024-12-23 RX ORDER — LIDOCAINE 40 MG/G
CREAM TOPICAL ONCE
OUTPATIENT
Start: 2024-12-23 | End: 2024-12-23

## 2024-12-23 RX ORDER — GENTAMICIN SULFATE 1 MG/G
OINTMENT TOPICAL ONCE
OUTPATIENT
Start: 2024-12-23 | End: 2024-12-23

## 2024-12-23 RX ORDER — MUPIROCIN 20 MG/G
OINTMENT TOPICAL ONCE
OUTPATIENT
Start: 2024-12-23 | End: 2024-12-23

## 2024-12-23 RX ORDER — NEOMYCIN/BACITRACIN/POLYMYXINB 3.5-400-5K
OINTMENT (GRAM) TOPICAL ONCE
OUTPATIENT
Start: 2024-12-23 | End: 2024-12-23

## 2024-12-23 RX ORDER — SILVER SULFADIAZINE 10 MG/G
CREAM TOPICAL ONCE
OUTPATIENT
Start: 2024-12-23 | End: 2024-12-23

## 2024-12-23 RX ORDER — LIDOCAINE 50 MG/G
OINTMENT TOPICAL ONCE
OUTPATIENT
Start: 2024-12-23 | End: 2024-12-23

## 2024-12-23 RX ADMIN — LIDOCAINE HYDROCHLORIDE: 40 SOLUTION TOPICAL at 08:11

## 2024-12-23 ASSESSMENT — PAIN DESCRIPTION - DESCRIPTORS: DESCRIPTORS: ACHING;BURNING

## 2024-12-23 ASSESSMENT — PAIN DESCRIPTION - LOCATION: LOCATION: LEG

## 2024-12-23 ASSESSMENT — PAIN DESCRIPTION - ORIENTATION: ORIENTATION: RIGHT

## 2024-12-23 ASSESSMENT — PAIN SCALES - GENERAL: PAINLEVEL_OUTOF10: 5

## 2024-12-23 ASSESSMENT — PAIN DESCRIPTION - FREQUENCY: FREQUENCY: CONTINUOUS

## 2024-12-23 ASSESSMENT — PAIN DESCRIPTION - PAIN TYPE: TYPE: CHRONIC PAIN

## 2024-12-23 NOTE — PROGRESS NOTES
Multilayer Compression Wrap   (Not Unna) Below the Knee    NAME:  Justin Baeza  YOB: 1965  MEDICAL RECORD NUMBER:  [id number]  DATE:  12/23/2024       Removed old Multilayer wrap if present and washed leg with mild soap/water.   Applied moisturizing agent to dry skin as needed.    Applied primary and secondary dressing as ordered    Applied multilayered dressing below the knee to Right lower leg(s)  (4 Layer Compression Wrap ) .    Instructed patient/caregiver not to remove dressing and to keep it clean and dry.    Instructed patient/caregiver on complications to report to provider, such as pain, numbness in toes, heavy drainage, and slippage of dressing.    Instructed patient on purpose of compression dressing and on activity and exercise recommendations.   Applied per   Guidelines    Electronically signed by Mera Coelho RN on 12/23/2024 at 9:06 AM    
12/23/2024      Return Appointment:  Return Appointment: With Khang Giles MD  in 1 Week(s)  [x] Return Appointment for a Wound Assessment with the nurse on:    Future Appointments   Date Time Provider Department Center   12/30/2024  9:30 AM [facility] MEDICATION MANAGEMENT [facility]               Orders Placed This Encounter   Procedures    Initiate Outpatient Wound Care Protocol    Culture, Tissue (with Gram Stain)       Home Care Company: none    Medically necessary services for evaluation and treatment: []Skilled Nursing (using clean technique) []PT (Eval & Treat) []OT (Eval & Treat) []Social Work []Dietician []Other:      Wound care instructions:  If you smoke we ask that you refrain from smoking. Smoking inhibits wounds from healing.  When taking antibiotics take the entire prescription as ordered. Do not stop taking until medication is all gone unless otherwise instructed.   Exercise as tolerated.   Keep weight off wounds and reposition every 2 hours if applicable.  Do not get wounds wet in the bath or shower unless otherwise instructed by your physician. If your wound is on your foot or leg, you may purchase a cast bag. Please ask at the pharmacy.  Wash hands with soap and water prior to and after every dressing change.    [x]Wash wounds with: No need to wash. Leave dressing in place.    [x]Kiah wound Topical Treatments: Do not apply lotions, creams, or ointments to the skin around the wound bed unless directed as followed:   Apply around the wound: triad cream        [x]Wound Location: right lower leg wounds  Apply Primary Dressing to wound: polymem ag  Secondary Dressing: Extra absorbant pad   Avoid contact of tape with skin if possible.  When to change Dressing: DO NOT CHANGE      [x] Multilayer Compression Wrap:  Type: Applied on Right lower leg(s)  4 Layer Compression Wrap- second layer of cast padding below calf     Do not get leg(s) with wrap wet.  If wraps become too tight call the center or

## 2024-12-23 NOTE — TELEPHONE ENCOUNTER
Left message with Katy [facility] [phone] regarding stocking order sent on 12/16/24. Awaiting return call.

## 2024-12-23 NOTE — PATIENT INSTRUCTIONS
Wound Care Center Physician Orders and Discharge Instructions  [facility]   [address]  Telephone: [phone] FAX [phone]  NAME:  Justin Baeza  YOB: 1965  MEDICAL RECORD NUMBER:  [id number]  DATE: 12/23/2024      Return Appointment:  Return Appointment: With Khang Giles MD  in 1 Week(s)  [x] Return Appointment for a Wound Assessment with the nurse on:    Future Appointments   Date Time Provider Department Center   12/30/2024  9:30 AM [facility] MEDICATION MANAGEMENT [facility]               Orders Placed This Encounter   Procedures    Initiate Outpatient Wound Care Protocol    Culture, Tissue (with Gram Stain)       Home Care Company: none    Medically necessary services for evaluation and treatment: []Skilled Nursing (using clean technique) []PT (Eval & Treat) []OT (Eval & Treat) []Social Work []Dietician []Other:      Wound care instructions:  If you smoke we ask that you refrain from smoking. Smoking inhibits wounds from healing.  When taking antibiotics take the entire prescription as ordered. Do not stop taking until medication is all gone unless otherwise instructed.   Exercise as tolerated.   Keep weight off wounds and reposition every 2 hours if applicable.  Do not get wounds wet in the bath or shower unless otherwise instructed by your physician. If your wound is on your foot or leg, you may purchase a cast bag. Please ask at the pharmacy.  Wash hands with soap and water prior to and after every dressing change.    [x]Wash wounds with: No need to wash. Leave dressing in place.    [x]Kiah wound Topical Treatments: Do not apply lotions, creams, or ointments to the skin around the wound bed unless directed as followed:   Apply around the wound: triad cream        [x]Wound Location: right lower leg wounds  Apply Primary Dressing to wound: polymem ag  Secondary Dressing: Extra absorbant pad   Avoid contact of tape with skin if

## 2024-12-23 NOTE — PROGRESS NOTES
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 8.0 %    Basophil % 0.5 0.0 - 1.9 %    Differential Method Automated       No results found for: "PHENYTOIN", "PHENOBARB", "VALPROATE", "CBMZ"        CONSTITUTIONAL  General Appearance: unremarkable, age appropriate, obese    MUSCULOSKELETAL  Muscle Strength and Tone:no dyskinesia, no tremor, no tic  Abnormal Involuntary Movements: No  Gait and Station: non-ataxic    PSYCHIATRIC   Level of Consciousness: awake and alert   Orientation: person, place, time, and situation  Grooming: Casually dressed and Well groomed  Psychomotor Behavior: normal, cooperative, psychomotor " retardation, eye contact normal  Speech: normal tone, normal rate, normal pitch, normal volume, spontaneous  Language: grossly intact, able to name, able to repeat  Mood: anxious and dysphoric  Affect: Anxious, Consistent with mood, Congruent with thought, and Blunted  Thought Process: linear, logical  Associations: intact   Thought Content: +delusions, denies SI, and denies HI  Perceptions: denies AH and denies  VH  Memory: Able to recall past events, Remote intact, and Recent intact  Attention:Normal and Attends to interview without distraction  Fund of Knowledge: Aware of current events and Vocabulary appropriate   Estimate if Intelligence:  Low Average based on work/education history, vocabulary and mental status exam  Insight: intact, has awareness of illness- partial with delusion  Judgment: behavior is adequate to circumstances, age appropriate- fair      PSYCHOSOCIAL    PSYCHOSOCIAL STRESSORS   health and interpersonal    FUNCTIONING RELATIONSHIPS   good support system    STRENGTHS AND LIABILITIES   Strength: Patient accepts guidance/feedback, Strength: Patient is expressive/articulate., Liability: Patient is unstable., Liability: Patient lacks coping skills.    Is the patient aware of the biomedical complications associated with substance abuse and mental illness? yes    Does the patient have an Advance Directive for Mental Health treatment? 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   Pain disorder    Complicated bereavement    BZD misuses     Psychosocial stressors    Nicotine Dependence     COPD  HTN  DM  Thyroid disease  GERD  Chronic back pain      PROBLEM LIST AND MANAGEMENT PLANS    Depression with psychosis: pt counseled  R/o SAD  -resumed home Risperdal- increase from 3 to 4 mg po q HS  -resumed home Paxil 60 mg po q day      Anxiety: pt counseled  -meds as above  -Paxil as above  -gabapentin as below    Insomnia: pt counseled  -vistaril prn     Pain: pt counseled  -start trial of home gabapentin- increase from 300 mg po TID to 400 mg po TID    Complicated bereavement: pt counseled     Nicotine Dependence: pt counseled  -started/conitnue nicotine 14 mg patch dermal     BZD misuses  -UDS negative today; pt denied recent in use  On early full remission  - pt counseled  - follow up with outpatient mental health providers after discharge for medication management and psychotherapy    Psychosocial stressors: pt counseled  -SW consulted to assist with resources    COPD: pt counseled  -Med consulted     HTN: pt counseled  -Med consulted    DM: pt counseled  -Med consulted    Thyroid disease: pt counseled  -Med consulted  -resumed Synthroid 75 mcg po q AM    GERD: pt counseled  -Med consulted    Chronic back pain: pt counseled  -Med consulted  -gabapentin as above      PRESCRIPTION DRUG MANAGEMENT  Compliance: yes  Side Effects: no  Regimen Adjustments: see above    Discussed diagnosis, risks and benefits of proposed treatment vs alternative treatments vs no treatment, potential side effects of these treatments and the inherent unpredictability of treatment. The patient expresses understanding of the above and displays the capacity to agree with this treatment given said understanding. Patient also agrees that, currently, the benefits outweigh the risks and would like to pursue/continue treatment at this time.    Any medications being used off-label were discussed with the patient inclusive of the evidence base for the use of the medications and consent was obtained for the off-label use of the medication.         DIAGNOSTIC TESTING  Labs reviewed with patient; follow up pending labs    Disposition:  -Will attempt to obtain outside psychiatric records if available  -SW to assist with aftercare planning and collateral  -Once stable discharge  home with outpatient follow up care and/or rehab  -Continue inpatient treatment under a [facility] and/or [facility] for danger to self/ danger to others/grave disability as evident by significant psychotic thought disorder and gravely disabled        Philip Mckee MD  Psychiatry

## 2024-12-30 ENCOUNTER — ANTI-COAG VISIT (OUTPATIENT)
Dept: PHARMACY | Age: 59
End: 2024-12-30
Payer: COMMERCIAL

## 2024-12-30 ENCOUNTER — HOSPITAL ENCOUNTER (OUTPATIENT)
Dept: WOUND CARE | Age: 59
Discharge: HOME | End: 2024-12-30
Attending: SPECIALIST
Payer: COMMERCIAL

## 2024-12-30 ENCOUNTER — TELEPHONE (OUTPATIENT)
Dept: INFECTIOUS DISEASES | Age: 59
End: 2024-12-30

## 2024-12-30 VITALS
SYSTOLIC BLOOD PRESSURE: 145 MMHG | TEMPERATURE: 97 F | DIASTOLIC BLOOD PRESSURE: 79 MMHG | HEART RATE: 73 BPM | RESPIRATION RATE: 16 BRPM

## 2024-12-30 DIAGNOSIS — L97.909 VENOUS ULCER (HCC): ICD-10-CM

## 2024-12-30 DIAGNOSIS — I89.0 LYMPHEDEMA: ICD-10-CM

## 2024-12-30 DIAGNOSIS — I89.0 CHRONIC ACQUIRED LYMPHEDEMA: Primary | ICD-10-CM

## 2024-12-30 DIAGNOSIS — I83.009 VENOUS ULCER (HCC): ICD-10-CM

## 2024-12-30 DIAGNOSIS — I87.331 IDIOPATHIC CHRONIC VENOUS HYPERTENSION OF RIGHT LOWER EXTREMITY WITH ULCER AND INFLAMMATION (HCC): ICD-10-CM

## 2024-12-30 DIAGNOSIS — I82.5Z1 CHRONIC VENOUS EMBOLISM AND THROMBOSIS OF DEEP VESSELS OF DISTAL END OF RIGHT LOWER EXTREMITY (HCC): Primary | ICD-10-CM

## 2024-12-30 DIAGNOSIS — Z91.199 NONCOMPLIANCE: ICD-10-CM

## 2024-12-30 LAB
INTERNATIONAL NORMALIZATION RATIO, POC: 2.7
PROTHROMBIN TIME, POC: 0

## 2024-12-30 PROCEDURE — 6370000000 HC RX 637 (ALT 250 FOR IP): Performed by: SPECIALIST

## 2024-12-30 PROCEDURE — 29581 APPL MULTLAYER CMPRN SYS LEG: CPT

## 2024-12-30 PROCEDURE — 11045 DBRDMT SUBQ TISS EACH ADDL: CPT

## 2024-12-30 PROCEDURE — 85610 PROTHROMBIN TIME: CPT | Performed by: PHARMACIST

## 2024-12-30 PROCEDURE — 11045 DBRDMT SUBQ TISS EACH ADDL: CPT | Performed by: SPECIALIST

## 2024-12-30 PROCEDURE — 99211 OFF/OP EST MAY X REQ PHY/QHP: CPT | Performed by: PHARMACIST

## 2024-12-30 PROCEDURE — 11042 DBRDMT SUBQ TIS 1ST 20SQCM/<: CPT

## 2024-12-30 PROCEDURE — 11042 DBRDMT SUBQ TIS 1ST 20SQCM/<: CPT | Performed by: SPECIALIST

## 2024-12-30 RX ORDER — BACITRACIN ZINC AND POLYMYXIN B SULFATE 500; 1000 [USP'U]/G; [USP'U]/G
OINTMENT TOPICAL ONCE
OUTPATIENT
Start: 2024-12-30 | End: 2024-12-30

## 2024-12-30 RX ORDER — GINSENG 100 MG
CAPSULE ORAL ONCE
OUTPATIENT
Start: 2024-12-30 | End: 2024-12-30

## 2024-12-30 RX ORDER — LIDOCAINE HYDROCHLORIDE 40 MG/ML
SOLUTION TOPICAL ONCE
Status: COMPLETED | OUTPATIENT
Start: 2024-12-30 | End: 2024-12-30

## 2024-12-30 RX ORDER — TRIAMCINOLONE ACETONIDE 1 MG/G
OINTMENT TOPICAL ONCE
OUTPATIENT
Start: 2024-12-30 | End: 2024-12-30

## 2024-12-30 RX ORDER — MUPIROCIN 20 MG/G
OINTMENT TOPICAL ONCE
OUTPATIENT
Start: 2024-12-30 | End: 2024-12-30

## 2024-12-30 RX ORDER — LIDOCAINE 40 MG/G
CREAM TOPICAL ONCE
OUTPATIENT
Start: 2024-12-30 | End: 2024-12-30

## 2024-12-30 RX ORDER — LIDOCAINE HYDROCHLORIDE 20 MG/ML
JELLY TOPICAL ONCE
OUTPATIENT
Start: 2024-12-30 | End: 2024-12-30

## 2024-12-30 RX ORDER — SULFAMETHOXAZOLE AND TRIMETHOPRIM 800; 160 MG/1; MG/1
1 TABLET ORAL 2 TIMES DAILY
Qty: 42 TABLET | Refills: 2 | Status: SHIPPED | OUTPATIENT
Start: 2024-12-30 | End: 2025-01-20

## 2024-12-30 RX ORDER — SILVER SULFADIAZINE 10 MG/G
CREAM TOPICAL ONCE
OUTPATIENT
Start: 2024-12-30 | End: 2024-12-30

## 2024-12-30 RX ORDER — LIDOCAINE 50 MG/G
OINTMENT TOPICAL ONCE
OUTPATIENT
Start: 2024-12-30 | End: 2024-12-30

## 2024-12-30 RX ORDER — GENTAMICIN SULFATE 1 MG/G
OINTMENT TOPICAL ONCE
OUTPATIENT
Start: 2024-12-30 | End: 2024-12-30

## 2024-12-30 RX ORDER — LIDOCAINE HYDROCHLORIDE 40 MG/ML
SOLUTION TOPICAL ONCE
OUTPATIENT
Start: 2024-12-30 | End: 2024-12-30

## 2024-12-30 RX ORDER — BETAMETHASONE DIPROPIONATE 0.05 %
OINTMENT (GRAM) TOPICAL ONCE
OUTPATIENT
Start: 2024-12-30 | End: 2024-12-30

## 2024-12-30 RX ORDER — SODIUM CHLOR/HYPOCHLOROUS ACID 0.033 %
SOLUTION, IRRIGATION IRRIGATION ONCE
OUTPATIENT
Start: 2024-12-30 | End: 2024-12-30

## 2024-12-30 RX ORDER — CLOBETASOL PROPIONATE 0.5 MG/G
OINTMENT TOPICAL ONCE
OUTPATIENT
Start: 2024-12-30 | End: 2024-12-30

## 2024-12-30 RX ORDER — NEOMYCIN/BACITRACIN/POLYMYXINB 3.5-400-5K
OINTMENT (GRAM) TOPICAL ONCE
OUTPATIENT
Start: 2024-12-30 | End: 2024-12-30

## 2024-12-30 RX ADMIN — LIDOCAINE HYDROCHLORIDE: 40 SOLUTION TOPICAL at 08:29

## 2024-12-30 ASSESSMENT — PAIN DESCRIPTION - PAIN TYPE: TYPE: CHRONIC PAIN

## 2024-12-30 ASSESSMENT — PAIN DESCRIPTION - LOCATION: LOCATION: LEG

## 2024-12-30 ASSESSMENT — PAIN DESCRIPTION - DESCRIPTORS: DESCRIPTORS: ACHING;BURNING

## 2024-12-30 ASSESSMENT — PAIN SCALES - GENERAL: PAINLEVEL_OUTOF10: 4

## 2024-12-30 ASSESSMENT — PAIN DESCRIPTION - FREQUENCY: FREQUENCY: CONTINUOUS

## 2024-12-30 NOTE — PATIENT INSTRUCTIONS
Wound Care Center Physician Orders and Discharge Instructions  [facility]   [address]  Telephone: [phone] FAX [phone]  NAME:  Justin Baeza  YOB: 1965  MEDICAL RECORD NUMBER:  [id number]  DATE: 12/30/2024      Return Appointment:  Return Appointment: With Khang Giles MD  in 1 Week(s)  [x] Return Appointment for a Wound Assessment with the nurse on:    Future Appointments   Date Time Provider Department Center   12/30/2024  9:30 AM [facility] MEDICATION MANAGEMENT [facility]               Orders Placed This Encounter   Procedures    Initiate Outpatient Wound Care Protocol       Home Care Company: none    Medically necessary services for evaluation and treatment: []Skilled Nursing (using clean technique) []PT (Eval & Treat) []OT (Eval & Treat) []Social Work []Dietician []Other:      Wound care instructions:  If you smoke we ask that you refrain from smoking. Smoking inhibits wounds from healing.  When taking antibiotics take the entire prescription as ordered. Do not stop taking until medication is all gone unless otherwise instructed.   Exercise as tolerated.   Keep weight off wounds and reposition every 2 hours if applicable.  Do not get wounds wet in the bath or shower unless otherwise instructed by your physician. If your wound is on your foot or leg, you may purchase a cast bag. Please ask at the pharmacy.  Wash hands with soap and water prior to and after every dressing change.    [x]Wash wounds with: No need to wash. Leave dressing in place.    [x]Kiah wound Topical Treatments: Do not apply lotions, creams, or ointments to the skin around the wound bed unless directed as followed:   Apply around the wound: triad cream        [x]Wound Location: right lower leg wounds  Apply Primary Dressing to wound: polymem ag  Secondary Dressing: Extra absorbant pad   Avoid contact of tape with skin if possible.  When to change Dressing:

## 2024-12-30 NOTE — PROGRESS NOTES
Multilayer Compression Wrap   (Not Unna) Below the Knee    NAME:  Justin Baeza  YOB: 1965  MEDICAL RECORD NUMBER:  [id number]  DATE:  12/30/2024       Removed old Multilayer wrap if present and washed leg with mild soap/water.   Applied moisturizing agent to dry skin as needed.    Applied primary and secondary dressing as ordered    Applied multilayered dressing below the knee to Right lower leg(s)  (4 Layer Compression Wrap ) .    Instructed patient/caregiver not to remove dressing and to keep it clean and dry.    Instructed patient/caregiver on complications to report to provider, such as pain, numbness in toes, heavy drainage, and slippage of dressing.    Instructed patient on purpose of compression dressing and on activity and exercise recommendations.   Applied per   Guidelines    Electronically signed by Edgar Nunez RN on 12/30/2024 at 9:16 AM

## 2024-12-30 NOTE — TELEPHONE ENCOUNTER
Pt with worse LE wound and drainage  12/23 Cult sent -   GS - 3+WBC, 1+GPC  Cult - rare Ps aeurginosa (R cipro, P/T, cefepime; S meropenem, gent / tobra)           Light MRSA (S clinda, Tetra, T/S; vanco JIGNESH 1)           Light E faecalis (S amp / vanco)           Light Alcaligenes faecalis (S P/T, meropenem, T/S; R tetra)    Will give Bactrim (T/S - trimethoprim / sulfamethazole) ds bid  Reassess in 1-2 weeks and if not improved, will need PICC / OPAT    Called prescription into [address]  Called and left message for pt

## 2024-12-30 NOTE — PROGRESS NOTES
ANTICOAGULATION SERVICE    Justin Baeza is a 59 y.o. male with PMHx significant for chronic DVT (last in Jan, 2019) who presents to clinic 12/30/2024 for anticoagulation monitoring and adjustment.  Patient has been taking warfarin for 15+ years.     Anticoagulation Indication(s):  DVT    Referring Physician:  Dr. Orozco    Goal INR Range:  2-3  Duration of Anticoagulation Therapy:  Indefinite  Time of day dose taken:  AM  Product patient has at home:  warfarin 5 mg (peach)    INR Summary                            Warfarin regimen (mg)  Date INR   A/P    Sun Mon Tue Wed Thu Fri Sat Mg/wk  12/30 2.7 At goal, continue   7.5 5 7.5 7.5 7.5 5 7.5 47.5  11/25 2.8 At goal, continue   7.5 5 7.5 7.5 7.5 5 7.5 47.5  10/18 2.9 At goal, continue   7.5 5 7.5 7.5 7.5 5 7.5 47.5  8/15 2.7 At goal, continue   7.5 5 7.5 7.5 7.5 5 7.5 47.5  7/3 2.4 At goal, continue   7.5 5 7.5 7.5 7.5 5 7.5 47.5  3/14 2.6 At goal, continue   7.5 5 7.5 7.5 7.5 5 7.5 47.5  2/12 2.2 At goal, continue   7.5 5 7.5 7.5 7.5 5 7.5 47.5  2/8 1.82 Per TJH   2/5 3.9 Above goal, hold   7.5 5 0/7.5 5/7.5 7.5 5 7.5 47.5  1/17 3.4 Above goal, continue  7.5 5 7.5 7.5 7.5 5 7.5 47.5  11/22 2.7 At goal, continue  7.5 5 7.5 7.5 7.5 5 7.5 47.5  10/11 2.9 At goal, continue  7.5 5 7.5 7.5 7.5 5 7.5 47.5  8/28 2.7  At goal, continue  7.5 5 7.5 7.5 7.5 5 7.5 47.5  8/2 2.37 Per lab    7/7 2.4  At goal, continue  7.5 5 7.5 7.5 7.5 5 7.5 47.5  5/26 2.7  At goal, continue  7.5 5 7.5 7.5 7.5 5 7.5 47.5  4/28 3.2 Above goal, continue  7.5 5 7.5 7.5 7.5 5 7.5 47.5  2/16 2.9 At goal, continue  7.5 5 7.5 7.5 7.5 5 7.5 47.5  1/9 2.9 At goal, continue  7.5 5 7.5 7.5 7.5 5 7.5 47.5  12/5 2.5 At goal, continue  7.5 5 7.5 7.5 7.5 5 7.5 47.5  11/7 2.3 At goal, continue  7.5 5 7.5 7.5 7.5 5 7.5 47.5  10/10 2.9 At goal, continue  7.5 5 7.5 7.5 7.5 5 7.5 47.5  7/27 3.1 Above goal, continue    7.5 5 7.5 7.5 7.5 5 7.5 47.5  7/6 3.1 Above goal, decrease  7.5 5 7.5 7.5 7.5 5 7.5 47.5  6/22 2.4 At

## 2024-12-30 NOTE — PROGRESS NOTES
[facility]  Progress Note and Procedure Note      Justin Baeza  MEDICAL RECORD NUMBER:  [id number]  AGE: 59 y.o.   GENDER: male  : 1965  EPISODE DATE:  2024    Subjective:     Chief Complaint   Patient presents with    Wound Check       RLE           HISTORY of PRESENT ILLNESS HPI     Justin Baeza is a 59 y.o. male who presents today for wound/ulcer evaluation.   History of Wound Context: Patient continues follow-up for chronic venous insufficiency with ulceration and lymphedema right lower extremity. He has now been off his antibiotics for several weeks. Patient continues to be on his feet extended periods of time during the day. Little utilization of lymphedema pumps. Once again we have been using a PolyMem with silver primary dressing.   Wound/Ulcer Pain Timing/Severity: none  Quality of pain: N/A  Severity:  0 / 10   Modifying Factors: None  Associated Signs/Symptoms: edema and drainage    Ulcer Identification:  Ulcer Type: venous    Contributing Factors: edema, venous stasis, lymphedema, diabetes, and anticoagulation therapy    Acute Wound: N/A not an acute wound    PAST MEDICAL HISTORY        Diagnosis Date    DVT (deep venous thrombosis) (HCC)     Hx of blood clots     Pain and swelling of right lower leg        PAST SURGICAL HISTORY    Past Surgical History:   Procedure Laterality Date    BALLOON ANGIOPLASTY, ARTERY Right 2017    at [facility]    DILATATION, ESOPHAGUS      SKIN SPLIT GRAFT Right        FAMILY HISTORY    Family History   Problem Relation Age of Onset    Diabetes Mother     Heart Attack Father        SOCIAL HISTORY    Social History     Tobacco Use    Smoking status: Never    Smokeless tobacco: Never   Vaping Use    Vaping status: Never Used   Substance Use Topics    Alcohol use: No    Drug use: No       ALLERGIES    No Known Allergies    MEDICATIONS    Current Outpatient Medications on File Prior to Encounter   Medication Sig Dispense Refill

## 2024-12-31 NOTE — TELEPHONE ENCOUNTER
Spoke with pt.  Scheduled for 1/15/25 at 0940  Encouraged pt to contact his office in 1 week if not seeing any improvement in leg  Pt verbalized understanding  Also notified pt that appt may be moved up, IF a clinic day is added, for now he is scheduled for 15th  Pt verbalized understanding

## 2025-01-05 NOTE — PROGRESS NOTES
ANTICOAGULATION SERVICE    Arline Barron is a 62 y.o. male with PMHx significant for chronic DVT (last in Jan, 2019) who presents to clinic 10/11/2023 for anticoagulation monitoring and adjustment. Patient has been taking warfarin for 15+ years.      Anticoagulation Indication(s):  DVT    Referring Physician:  Dr. Pablo Montes  Goal INR Range:  2-3  Duration of Anticoagulation Therapy:  Indefinite  Time of day dose taken:  AM  Product patient has at home:  warfarin 5 mg (peach)    INR Summary                            Warfarin regimen (mg)  Date INR   A/P    Sun Mon Tue Wed Thu Fri Sat Mg/wk  10/11 2.9 At goal, continue  7.5 5 7.5 7.5 7.5 5 7.5 47.5  8/28 2.7  At goal, continue  7.5 5 7.5 7.5 7.5 5 7.5 47.5  8/2 2.37 Per lab    7/7 2.4  At goal, continue  7.5 5 7.5 7.5 7.5 5 7.5 47.5  5/26 2.7  At goal, continue  7.5 5 7.5 7.5 7.5 5 7.5 47.5  4/28 3.2 Above goal, continue  7.5 5 7.5 7.5 7.5 5 7.5 47.5  2/16 2.9 At goal, continue  7.5 5 7.5 7.5 7.5 5 7.5 47.5  1/9 2.9 At goal, continue  7.5 5 7.5 7.5 7.5 5 7.5 47.5  12/5 2.5 At goal, continue  7.5 5 7.5 7.5 7.5 5 7.5 47.5  11/7 2.3 At goal, continue  7.5 5 7.5 7.5 7.5 5 7.5 47.5  10/10 2.9 At goal, continue  7.5 5 7.5 7.5 7.5 5 7.5 47.5  7/27 3.1 Above goal, continue    7.5 5 7.5 7.5 7.5 5 7.5 47.5  7/6 3.1 Above goal, decrease  7.5 5 7.5 7.5 7.5 5 7.5 47.5  6/22 2.4 At goal, resume prv dose 7.5 7.5 7.5 7.5 7.5 5 7.5 50  6/1 2.6 At goal, continue   7.5 5 7.5 5 7.5 5 7.5 45  5/18 4.1 Above goal, dec (abx)  7.5 5 7.5 5 7.5 5 7.5 45  4/6 2.8 At goal, continue  7.5 7.5 7.5 7.5 7.5 5 7.5 50  12/1 2.9 At goal, continue  7.5 7.5 7.5 7.5 7.5 5 7.5 50  11/10 2.4 At goal, continue   7.5 7.5 7.5 7.5 7.5 5 7.5 50  10/27 4.4 Above goal, hold+dec  7.5 7.5 7.5 7.5 0/7.5 5 7.5 50  8/25 2.9 At goal, continue  7.5 7.5 7.5 7.5 7.5 7.5 7.5 52.5  7/14 2.5 At goal, continue   7.5 7.5 7.5 7.5 7.5 7.5 7.5 52.5  6/14 2.9 At goal, continue  7.5 7.5 7.5 7.5 0/7.5 7.5 7.5 52.5  5/26 3.9 Above goal
Not Applicable

## 2025-01-09 ENCOUNTER — HOSPITAL ENCOUNTER (OUTPATIENT)
Dept: WOUND CARE | Age: 60
Discharge: HOME | End: 2025-01-09
Attending: SPECIALIST
Payer: COMMERCIAL

## 2025-01-09 VITALS
SYSTOLIC BLOOD PRESSURE: 125 MMHG | HEART RATE: 67 BPM | RESPIRATION RATE: 16 BRPM | TEMPERATURE: 97.1 F | DIASTOLIC BLOOD PRESSURE: 79 MMHG

## 2025-01-09 DIAGNOSIS — I89.0 CHRONIC ACQUIRED LYMPHEDEMA: Primary | ICD-10-CM

## 2025-01-09 DIAGNOSIS — Z91.199 NONCOMPLIANCE: ICD-10-CM

## 2025-01-09 DIAGNOSIS — I83.009 VENOUS ULCER (HCC): ICD-10-CM

## 2025-01-09 DIAGNOSIS — I87.331 IDIOPATHIC CHRONIC VENOUS HYPERTENSION OF RIGHT LOWER EXTREMITY WITH ULCER AND INFLAMMATION (HCC): ICD-10-CM

## 2025-01-09 DIAGNOSIS — I89.0 LYMPHEDEMA: ICD-10-CM

## 2025-01-09 DIAGNOSIS — L97.909 VENOUS ULCER (HCC): ICD-10-CM

## 2025-01-09 PROCEDURE — 6370000000 HC RX 637 (ALT 250 FOR IP): Performed by: SPECIALIST

## 2025-01-09 PROCEDURE — 11045 DBRDMT SUBQ TISS EACH ADDL: CPT

## 2025-01-09 PROCEDURE — 11042 DBRDMT SUBQ TIS 1ST 20SQCM/<: CPT | Performed by: SPECIALIST

## 2025-01-09 PROCEDURE — 11042 DBRDMT SUBQ TIS 1ST 20SQCM/<: CPT

## 2025-01-09 PROCEDURE — 29581 APPL MULTLAYER CMPRN SYS LEG: CPT

## 2025-01-09 PROCEDURE — 11045 DBRDMT SUBQ TISS EACH ADDL: CPT | Performed by: SPECIALIST

## 2025-01-09 RX ORDER — LIDOCAINE HYDROCHLORIDE 20 MG/ML
JELLY TOPICAL ONCE
OUTPATIENT
Start: 2025-01-09 | End: 2025-01-09

## 2025-01-09 RX ORDER — CLOBETASOL PROPIONATE 0.5 MG/G
OINTMENT TOPICAL ONCE
OUTPATIENT
Start: 2025-01-09 | End: 2025-01-09

## 2025-01-09 RX ORDER — MUPIROCIN 20 MG/G
OINTMENT TOPICAL ONCE
OUTPATIENT
Start: 2025-01-09 | End: 2025-01-09

## 2025-01-09 RX ORDER — LIDOCAINE HYDROCHLORIDE 40 MG/ML
SOLUTION TOPICAL ONCE
Status: COMPLETED | OUTPATIENT
Start: 2025-01-09 | End: 2025-01-09

## 2025-01-09 RX ORDER — GENTAMICIN SULFATE 1 MG/G
OINTMENT TOPICAL ONCE
OUTPATIENT
Start: 2025-01-09 | End: 2025-01-09

## 2025-01-09 RX ORDER — TRIAMCINOLONE ACETONIDE 1 MG/G
OINTMENT TOPICAL ONCE
OUTPATIENT
Start: 2025-01-09 | End: 2025-01-09

## 2025-01-09 RX ORDER — SODIUM CHLOR/HYPOCHLOROUS ACID 0.033 %
SOLUTION, IRRIGATION IRRIGATION ONCE
OUTPATIENT
Start: 2025-01-09 | End: 2025-01-09

## 2025-01-09 RX ORDER — BACITRACIN ZINC AND POLYMYXIN B SULFATE 500; 1000 [USP'U]/G; [USP'U]/G
OINTMENT TOPICAL ONCE
OUTPATIENT
Start: 2025-01-09 | End: 2025-01-09

## 2025-01-09 RX ORDER — GINSENG 100 MG
CAPSULE ORAL ONCE
OUTPATIENT
Start: 2025-01-09 | End: 2025-01-09

## 2025-01-09 RX ORDER — BETAMETHASONE DIPROPIONATE 0.05 %
OINTMENT (GRAM) TOPICAL ONCE
OUTPATIENT
Start: 2025-01-09 | End: 2025-01-09

## 2025-01-09 RX ORDER — LIDOCAINE 40 MG/G
CREAM TOPICAL ONCE
OUTPATIENT
Start: 2025-01-09 | End: 2025-01-09

## 2025-01-09 RX ORDER — SILVER SULFADIAZINE 10 MG/G
CREAM TOPICAL ONCE
OUTPATIENT
Start: 2025-01-09 | End: 2025-01-09

## 2025-01-09 RX ORDER — LIDOCAINE 50 MG/G
OINTMENT TOPICAL ONCE
OUTPATIENT
Start: 2025-01-09 | End: 2025-01-09

## 2025-01-09 RX ORDER — NEOMYCIN/BACITRACIN/POLYMYXINB 3.5-400-5K
OINTMENT (GRAM) TOPICAL ONCE
OUTPATIENT
Start: 2025-01-09 | End: 2025-01-09

## 2025-01-09 RX ORDER — LIDOCAINE HYDROCHLORIDE 40 MG/ML
SOLUTION TOPICAL ONCE
OUTPATIENT
Start: 2025-01-09 | End: 2025-01-09

## 2025-01-09 RX ADMIN — LIDOCAINE HYDROCHLORIDE: 40 SOLUTION TOPICAL at 08:59

## 2025-01-09 ASSESSMENT — PAIN SCALES - GENERAL: PAINLEVEL_OUTOF10: 4

## 2025-01-09 NOTE — PROGRESS NOTES
Multilayer Compression Wrap   (Not Unna) Below the Knee    NAME:  Justin Baeza  YOB: 1965  MEDICAL RECORD NUMBER:  [id number]  DATE:  1/9/2025       Removed old Multilayer wrap if present and washed leg with mild soap/water.   Applied moisturizing agent to dry skin as needed.    Applied primary and secondary dressing as ordered    Applied multilayered dressing below the knee to Right lower leg(s)  (4 Layer Compression Wrap ) .    Instructed patient/caregiver not to remove dressing and to keep it clean and dry.    Instructed patient/caregiver on complications to report to provider, such as pain, numbness in toes, heavy drainage, and slippage of dressing.    Instructed patient on purpose of compression dressing and on activity and exercise recommendations.   Applied per   Guidelines    Electronically signed by Kathy Gomez RN on 1/9/2025 at 9:11 AM

## 2025-01-09 NOTE — PATIENT INSTRUCTIONS
of tape with skin if possible.  When to change Dressing: DO NOT CHANGE      [x] Multilayer Compression Wrap:  Type: Applied on Right lower leg(s)  4 Layer Compression Wrap- second layer of cast padding below calf     Do not get leg(s) with wrap wet.  If wraps become too tight call the center or completely remove the wrap.   Elevate leg(s) above the level of the heart when sitting.  Avoid prolonged standing in one place.   Applied in Clinic on 1/9/2025  The Goal of this therapy is to reduce edema and get into long term compression garments to control venous insufficiency, lymphedema and reduce occurrence of venous ulcers    [x] Edema Control: 30-40mmHG  Apply: Compression Stocking on the Left leg  Apply every morning immediately when getting up. Remove every night before going to bed unless instructed otherwise     Elevate leg(s) above the level of the heart for 30 minutes 4-5 times a day and/or when sitting.  Avoid prolonged standing in one place.    [x] Lymphedema Therapy:  Wear Lymphedema pumps twice a day at settings prescribed by your physician.   Avoid prolonged standing in one place.      Dietary:  Important dietary reminders:  1. Increase Protein intake (i.e. Lean meats, fish, eggs, legumes, and yogurt)  2. No added salt  3. If diabetic, follow a diabetic diet and check glucose prior to meals or as instructed by your physician.    Dietary Supplements(Take twice a day unless instructed otherwise):  [] Papito  [] 30ml ProStat [] Ensure Complete [] Ensure Max/Premier [] Expedite [] Other:    Your nurse  is:  talisha     Electronically signed by Talisha Hawkins RN on 1/9/2025 at 9:05 AM     Wound Care Center Information: Should you experience any significant changes in your wound(s) or have questions about your wound care, please contact the [facility] at [phone].   Hours of operation:  Mon:  8AM - 2PM  Tue: 11AM - 5PM  Wed: CLOSED  Thur: 8AM - 4:30PM  Fri:  8AM - 4:30PM  The

## 2025-01-09 NOTE — PROGRESS NOTES
[facility]  Progress Note and Procedure Note      Justin Baeza  MEDICAL RECORD NUMBER:  [id number]  AGE: 59 y.o.   GENDER: male  : 1965  EPISODE DATE:  2025    Subjective:     Chief Complaint   Patient presents with    Wound Check     RLE         HISTORY of PRESENT ILLNESS HPI     Justin Baeza is a 59 y.o. male who presents today for wound/ulcer evaluation.   History of Wound Context: Patient continues follow-up for chronic venous insufficiency with ulceration and lymphedema right lower extremity. He has now been off his antibiotics for several weeks. Patient continues to be on his feet extended periods of time during the day. Little utilization of lymphedema pumps. Once again we have been using a PolyMem with silver primary dressing.  He is now on Bactrim as prescribed by Dr. Agustin from infectious disease for his Pseudomonas cultured from the wound  Wound/Ulcer Pain Timing/Severity: none  Quality of pain: N/A  Severity:  0 / 10   Modifying Factors: None  Associated Signs/Symptoms: edema and drainage    Ulcer Identification:  Ulcer Type: venous    Contributing Factors: edema, venous stasis, lymphedema, diabetes, obesity, and anticoagulation therapy    Acute Wound: N/A not an acute wound    PAST MEDICAL HISTORY        Diagnosis Date    DVT (deep venous thrombosis) (HCC)     Hx of blood clots     Pain and swelling of right lower leg        PAST SURGICAL HISTORY    Past Surgical History:   Procedure Laterality Date    BALLOON ANGIOPLASTY, ARTERY Right 2017    at [facility]    DILATATION, ESOPHAGUS      SKIN SPLIT GRAFT Right        FAMILY HISTORY    Family History   Problem Relation Age of Onset    Diabetes Mother     Heart Attack Father        SOCIAL HISTORY    Social History     Tobacco Use    Smoking status: Never    Smokeless tobacco: Never   Vaping Use    Vaping status: Never Used   Substance Use Topics    Alcohol use: No    Drug use: No       ALLERGIES    No Known

## 2025-01-15 ENCOUNTER — TELEMEDICINE (OUTPATIENT)
Dept: INFECTIOUS DISEASES | Age: 60
End: 2025-01-15
Payer: COMMERCIAL

## 2025-01-15 DIAGNOSIS — A49.8 PSEUDOMONAS INFECTION: ICD-10-CM

## 2025-01-15 DIAGNOSIS — L97.919 IDIOPATHIC CHRONIC VENOUS HYPERTENSION OF RIGHT LOWER EXTREMITY WITH ULCER (HCC): ICD-10-CM

## 2025-01-15 DIAGNOSIS — M79.661 PAIN AND SWELLING OF RIGHT LOWER LEG: ICD-10-CM

## 2025-01-15 DIAGNOSIS — I87.311 IDIOPATHIC CHRONIC VENOUS HYPERTENSION OF RIGHT LOWER EXTREMITY WITH ULCER (HCC): ICD-10-CM

## 2025-01-15 DIAGNOSIS — L03.115 CELLULITIS OF RIGHT LEG: Primary | ICD-10-CM

## 2025-01-15 DIAGNOSIS — L97.919 LEG ULCER, RIGHT, WITH UNSPECIFIED SEVERITY (HCC): ICD-10-CM

## 2025-01-15 DIAGNOSIS — A49.01 STAPH AUREUS INFECTION: ICD-10-CM

## 2025-01-15 DIAGNOSIS — M79.89 PAIN AND SWELLING OF RIGHT LOWER LEG: ICD-10-CM

## 2025-01-15 PROCEDURE — 99214 OFFICE O/P EST MOD 30 MIN: CPT | Performed by: INTERNAL MEDICINE

## 2025-01-15 NOTE — PROGRESS NOTES
piperacillin-tazobactam Sensitive <=16 mcg/mL   tobramycin Sensitive <=4 mcg/mL          IMPRESSION    Hx DVT, LE edema  L LE chronic wounds  - OR debridement 4/18   - last cult 5/2021 with MSSA, C striatum, Ps aeruginosa   - improved with po doxycycline     6/16/21 - no cellulitis, no gross infection  7/28/21 - no infection  10/27/21 - no infection    8/8/23 - R leg wound infection, cult Ps aeruginosa, E faecalis, on iv Zosyn since 8/2, improved     RECOMMENDATIONS:      Cont Bactrim - has 6 more day (completes 3 weeks)    Wound care per [facility]  F/u [facility] - 1/1625    If R leg infection flares - increase in drainage, odor, pain, SELF INITIATE Bactrim ds bid x 2-3 weeks   - pt had RF on prescription   - if no improvement, call me / obtain new cult      See [facility] Virtual Visit Disclaimer at top    - Spent 30 minutes on visit (including history, review of data, development and implementation of treatment plan and coordination of care.   - Over 50% of time spent in pt counseling and education.    Roberto Agustin MD

## 2025-01-16 ENCOUNTER — HOSPITAL ENCOUNTER (OUTPATIENT)
Dept: WOUND CARE | Age: 60
Discharge: HOME | End: 2025-01-16
Attending: SPECIALIST
Payer: COMMERCIAL

## 2025-01-16 VITALS — TEMPERATURE: 97.5 F | SYSTOLIC BLOOD PRESSURE: 137 MMHG | DIASTOLIC BLOOD PRESSURE: 70 MMHG

## 2025-01-16 DIAGNOSIS — L97.909 VENOUS ULCER (HCC): ICD-10-CM

## 2025-01-16 DIAGNOSIS — I89.0 LYMPHEDEMA: ICD-10-CM

## 2025-01-16 DIAGNOSIS — I83.009 VENOUS ULCER (HCC): ICD-10-CM

## 2025-01-16 DIAGNOSIS — Z91.199 NONCOMPLIANCE: ICD-10-CM

## 2025-01-16 DIAGNOSIS — I87.331 IDIOPATHIC CHRONIC VENOUS HYPERTENSION OF RIGHT LOWER EXTREMITY WITH ULCER AND INFLAMMATION (HCC): ICD-10-CM

## 2025-01-16 DIAGNOSIS — I89.0 CHRONIC ACQUIRED LYMPHEDEMA: Primary | ICD-10-CM

## 2025-01-16 PROCEDURE — 11042 DBRDMT SUBQ TIS 1ST 20SQCM/<: CPT

## 2025-01-16 PROCEDURE — 11045 DBRDMT SUBQ TISS EACH ADDL: CPT

## 2025-01-16 PROCEDURE — 29581 APPL MULTLAYER CMPRN SYS LEG: CPT

## 2025-01-16 PROCEDURE — 11042 DBRDMT SUBQ TIS 1ST 20SQCM/<: CPT | Performed by: SPECIALIST

## 2025-01-16 PROCEDURE — 11045 DBRDMT SUBQ TISS EACH ADDL: CPT | Performed by: SPECIALIST

## 2025-01-16 RX ORDER — MUPIROCIN 20 MG/G
OINTMENT TOPICAL ONCE
OUTPATIENT
Start: 2025-01-16 | End: 2025-01-16

## 2025-01-16 RX ORDER — GENTAMICIN SULFATE 1 MG/G
OINTMENT TOPICAL ONCE
OUTPATIENT
Start: 2025-01-16 | End: 2025-01-16

## 2025-01-16 RX ORDER — BACITRACIN ZINC AND POLYMYXIN B SULFATE 500; 1000 [USP'U]/G; [USP'U]/G
OINTMENT TOPICAL ONCE
OUTPATIENT
Start: 2025-01-16 | End: 2025-01-16

## 2025-01-16 RX ORDER — SODIUM CHLOR/HYPOCHLOROUS ACID 0.033 %
SOLUTION, IRRIGATION IRRIGATION ONCE
OUTPATIENT
Start: 2025-01-16 | End: 2025-01-16

## 2025-01-16 RX ORDER — TRIAMCINOLONE ACETONIDE 1 MG/G
OINTMENT TOPICAL ONCE
OUTPATIENT
Start: 2025-01-16 | End: 2025-01-16

## 2025-01-16 RX ORDER — CLOBETASOL PROPIONATE 0.5 MG/G
OINTMENT TOPICAL ONCE
OUTPATIENT
Start: 2025-01-16 | End: 2025-01-16

## 2025-01-16 RX ORDER — LIDOCAINE 50 MG/G
OINTMENT TOPICAL ONCE
OUTPATIENT
Start: 2025-01-16 | End: 2025-01-16

## 2025-01-16 RX ORDER — LIDOCAINE 40 MG/G
CREAM TOPICAL ONCE
OUTPATIENT
Start: 2025-01-16 | End: 2025-01-16

## 2025-01-16 RX ORDER — LIDOCAINE HYDROCHLORIDE 20 MG/ML
JELLY TOPICAL ONCE
OUTPATIENT
Start: 2025-01-16 | End: 2025-01-16

## 2025-01-16 RX ORDER — BETAMETHASONE DIPROPIONATE 0.05 %
OINTMENT (GRAM) TOPICAL ONCE
OUTPATIENT
Start: 2025-01-16 | End: 2025-01-16

## 2025-01-16 RX ORDER — LIDOCAINE HYDROCHLORIDE 40 MG/ML
SOLUTION TOPICAL ONCE
OUTPATIENT
Start: 2025-01-16 | End: 2025-01-16

## 2025-01-16 RX ORDER — LIDOCAINE HYDROCHLORIDE 40 MG/ML
SOLUTION TOPICAL ONCE
Status: DISCONTINUED | OUTPATIENT
Start: 2025-01-16 | End: 2025-01-17 | Stop reason: HOSPADM

## 2025-01-16 RX ORDER — GINSENG 100 MG
CAPSULE ORAL ONCE
OUTPATIENT
Start: 2025-01-16 | End: 2025-01-16

## 2025-01-16 RX ORDER — SILVER SULFADIAZINE 10 MG/G
CREAM TOPICAL ONCE
OUTPATIENT
Start: 2025-01-16 | End: 2025-01-16

## 2025-01-16 RX ORDER — NEOMYCIN/BACITRACIN/POLYMYXINB 3.5-400-5K
OINTMENT (GRAM) TOPICAL ONCE
OUTPATIENT
Start: 2025-01-16 | End: 2025-01-16

## 2025-01-16 ASSESSMENT — PAIN DESCRIPTION - LOCATION: LOCATION: LEG

## 2025-01-16 ASSESSMENT — PAIN SCALES - GENERAL: PAINLEVEL_OUTOF10: 4

## 2025-01-16 ASSESSMENT — PAIN DESCRIPTION - ORIENTATION: ORIENTATION: RIGHT

## 2025-01-16 NOTE — PROGRESS NOTES
Multilayer Compression Wrap   (Not Unna) Below the Knee    NAME:  Justin Baeza  YOB: 1965  MEDICAL RECORD NUMBER:  [id number]  DATE:  1/16/2025       Removed old Multilayer wrap if present and washed leg with mild soap/water.   Applied moisturizing agent to dry skin as needed.    Applied primary and secondary dressing as ordered    Applied multilayered dressing below the knee to Right lower leg(s)  (4 Layer Compression Wrap ) .    Instructed patient/caregiver not to remove dressing and to keep it clean and dry.    Instructed patient/caregiver on complications to report to provider, such as pain, numbness in toes, heavy drainage, and slippage of dressing.    Instructed patient on purpose of compression dressing and on activity and exercise recommendations.   Applied per   Guidelines    Electronically signed by Ashley Aviles RN on 1/16/2025 at 9:35 AM    
  Wound Depth (cm) 0.1 cm 01/16/25 0815   Wound Surface Area (cm^2) 2.52 cm^2 01/16/25 0815   Change in Wound Size % (l*w) 91.6 01/16/25 0815   Wound Volume (cm^3) 0.252 cm^3 01/16/25 0815   Wound Healing % 92 01/16/25 0815   Post-Procedure Length (cm) 1.9 cm 01/16/25 0856   Post-Procedure Width (cm) 1.5 cm 01/16/25 0856   Post-Procedure Depth (cm) 0.3 cm 01/16/25 0856   Post-Procedure Surface Area (cm^2) 2.85 cm^2 01/16/25 0856   Post-Procedure Volume (cm^3) 0.855 cm^3 01/16/25 0856   Distance Tunneling (cm) 0 cm 12/30/24 0810   Tunneling Position ___ O'Clock 0 04/18/24 0828   Undermining Starts ___ O'Clock 0 04/18/24 0828   Undermining Ends___ O'Clock 0 04/18/24 0828   Undermining Maxium Distance (cm) 0 12/30/24 0810   Wound Assessment Pink/red;Fibrin 01/16/25 0815   Drainage Amount Large (50-75% saturated) 01/16/25 0815   Drainage Description Yellow;Green 01/16/25 0815   Odor Moderate 01/16/25 0815   Kiah-wound Assessment Maceration 01/16/25 0815   Margins Defined edges 01/16/25 0815   Wound Thickness Description not for Pressure Injury Full thickness 01/16/25 0815   Number of days: 378          Percent of Wound Debrided: 100%    Total Surface Area Debrided:  31 sq cm    Diabetic/Pressure/Non Pressure Ulcers only:  Ulcer: Non-Pressure ulcer, fat layer exposed    Bleeding: Minimal    Hemostasis Achieved: by pressure    Procedural Pain: 0  / 10     Post Procedural Pain: 0 / 10     Response to treatment:  Well tolerated by patient.  Slow continued epithelialization of venous ulcers        Plan:   Will continue with Bactrim antibiotic therapy as outlined by Dr. Agustin from infectious disease.  Will attempt to utilize collagen as primary dressing to all ulcers with ongoing 4-layer compression.  Once again instructed to utilize his lymphedema pumps  Treatment Note: Please see attached Discharge Instructions.  These instructions were given and signed by the patient or POA    New Prescriptions    No medications on file

## 2025-01-16 NOTE — PATIENT INSTRUCTIONS
Wound Care Center Physician Orders and Discharge Instructions  [facility]   [address]  Telephone: [phone] FAX [phone]  NAME:  Justin Baeza  YOB: 1965  MEDICAL RECORD NUMBER:  [id number]  DATE: 1/16/2025      Return Appointment:  Return Appointment: With Khang Giles MD  in 1 Week(s)  [x] Return Appointment for a Wound Assessment with the nurse on:    Future Appointments   Date Time Provider Department Center   2/24/2025  9:30 AM [facility] MEDICATION MANAGEMENT [facility]               Orders Placed This Encounter   Procedures    Initiate Outpatient Wound Care Protocol       Home Care Company: none    Medically necessary services for evaluation and treatment: []Skilled Nursing (using clean technique) []PT (Eval & Treat) []OT (Eval & Treat) []Social Work []Dietician []Other:      Wound care instructions:  If you smoke we ask that you refrain from smoking. Smoking inhibits wounds from healing.  When taking antibiotics take the entire prescription as ordered. Do not stop taking until medication is all gone unless otherwise instructed.   Exercise as tolerated.   Keep weight off wounds and reposition every 2 hours if applicable.  Do not get wounds wet in the bath or shower unless otherwise instructed by your physician. If your wound is on your foot or leg, you may purchase a cast bag. Please ask at the pharmacy.  Wash hands with soap and water prior to and after every dressing change.    [x]Wash wounds with: No need to wash. Leave dressing in place.    [x]Kiah wound Topical Treatments: Do not apply lotions, creams, or ointments to the skin around the wound bed unless directed as followed:   Apply around the wound:  zinc paste- applied by Dr. Giles        [x]Wound Location: right lower leg wounds  Apply Primary Dressing to wound: collagen, covered by silver alginate  Secondary Dressing: Extra absorbant pad   Avoid contact of tape

## 2025-01-23 ENCOUNTER — HOSPITAL ENCOUNTER (OUTPATIENT)
Dept: WOUND CARE | Age: 60
Discharge: HOME | End: 2025-01-23
Attending: SPECIALIST
Payer: COMMERCIAL

## 2025-01-23 VITALS
RESPIRATION RATE: 16 BRPM | DIASTOLIC BLOOD PRESSURE: 86 MMHG | TEMPERATURE: 97.3 F | HEART RATE: 76 BPM | SYSTOLIC BLOOD PRESSURE: 136 MMHG

## 2025-01-23 DIAGNOSIS — I87.331 IDIOPATHIC CHRONIC VENOUS HYPERTENSION OF RIGHT LOWER EXTREMITY WITH ULCER AND INFLAMMATION (HCC): ICD-10-CM

## 2025-01-23 DIAGNOSIS — I89.0 LYMPHEDEMA: ICD-10-CM

## 2025-01-23 DIAGNOSIS — I89.0 CHRONIC ACQUIRED LYMPHEDEMA: Primary | ICD-10-CM

## 2025-01-23 DIAGNOSIS — I83.009 VENOUS ULCER (HCC): ICD-10-CM

## 2025-01-23 DIAGNOSIS — Z91.199 NONCOMPLIANCE: ICD-10-CM

## 2025-01-23 DIAGNOSIS — L97.909 VENOUS ULCER (HCC): ICD-10-CM

## 2025-01-23 PROCEDURE — 87070 CULTURE OTHR SPECIMN AEROBIC: CPT

## 2025-01-23 PROCEDURE — 11042 DBRDMT SUBQ TIS 1ST 20SQCM/<: CPT

## 2025-01-23 PROCEDURE — 87205 SMEAR GRAM STAIN: CPT

## 2025-01-23 PROCEDURE — 29581 APPL MULTLAYER CMPRN SYS LEG: CPT

## 2025-01-23 PROCEDURE — 87077 CULTURE AEROBIC IDENTIFY: CPT

## 2025-01-23 PROCEDURE — 87186 SC STD MICRODIL/AGAR DIL: CPT

## 2025-01-23 PROCEDURE — 11045 DBRDMT SUBQ TISS EACH ADDL: CPT

## 2025-01-23 RX ORDER — LIDOCAINE HYDROCHLORIDE 20 MG/ML
JELLY TOPICAL ONCE
OUTPATIENT
Start: 2025-01-23 | End: 2025-01-23

## 2025-01-23 RX ORDER — TRIAMCINOLONE ACETONIDE 1 MG/G
OINTMENT TOPICAL ONCE
OUTPATIENT
Start: 2025-01-23 | End: 2025-01-23

## 2025-01-23 RX ORDER — SILVER SULFADIAZINE 10 MG/G
CREAM TOPICAL ONCE
OUTPATIENT
Start: 2025-01-23 | End: 2025-01-23

## 2025-01-23 RX ORDER — SODIUM CHLOR/HYPOCHLOROUS ACID 0.033 %
SOLUTION, IRRIGATION IRRIGATION ONCE
OUTPATIENT
Start: 2025-01-23 | End: 2025-01-23

## 2025-01-23 RX ORDER — BACITRACIN ZINC AND POLYMYXIN B SULFATE 500; 1000 [USP'U]/G; [USP'U]/G
OINTMENT TOPICAL ONCE
OUTPATIENT
Start: 2025-01-23 | End: 2025-01-23

## 2025-01-23 RX ORDER — GINSENG 100 MG
CAPSULE ORAL ONCE
OUTPATIENT
Start: 2025-01-23 | End: 2025-01-23

## 2025-01-23 RX ORDER — GENTAMICIN SULFATE 1 MG/G
OINTMENT TOPICAL ONCE
OUTPATIENT
Start: 2025-01-23 | End: 2025-01-23

## 2025-01-23 RX ORDER — CLOBETASOL PROPIONATE 0.5 MG/G
OINTMENT TOPICAL ONCE
OUTPATIENT
Start: 2025-01-23 | End: 2025-01-23

## 2025-01-23 RX ORDER — LIDOCAINE 50 MG/G
OINTMENT TOPICAL ONCE
OUTPATIENT
Start: 2025-01-23 | End: 2025-01-23

## 2025-01-23 RX ORDER — BETAMETHASONE DIPROPIONATE 0.05 %
OINTMENT (GRAM) TOPICAL ONCE
OUTPATIENT
Start: 2025-01-23 | End: 2025-01-23

## 2025-01-23 RX ORDER — LIDOCAINE HYDROCHLORIDE 40 MG/ML
SOLUTION TOPICAL ONCE
OUTPATIENT
Start: 2025-01-23 | End: 2025-01-23

## 2025-01-23 RX ORDER — LIDOCAINE 40 MG/G
CREAM TOPICAL ONCE
OUTPATIENT
Start: 2025-01-23 | End: 2025-01-23

## 2025-01-23 RX ORDER — NEOMYCIN/BACITRACIN/POLYMYXINB 3.5-400-5K
OINTMENT (GRAM) TOPICAL ONCE
OUTPATIENT
Start: 2025-01-23 | End: 2025-01-23

## 2025-01-23 RX ORDER — MUPIROCIN 20 MG/G
OINTMENT TOPICAL ONCE
OUTPATIENT
Start: 2025-01-23 | End: 2025-01-23

## 2025-01-23 RX ORDER — LIDOCAINE 40 MG/G
CREAM TOPICAL ONCE
Status: DISCONTINUED | OUTPATIENT
Start: 2025-01-23 | End: 2025-01-24 | Stop reason: HOSPADM

## 2025-01-23 ASSESSMENT — PAIN SCALES - GENERAL: PAINLEVEL_OUTOF10: 4

## 2025-01-23 ASSESSMENT — PAIN DESCRIPTION - ORIENTATION: ORIENTATION: RIGHT

## 2025-01-23 ASSESSMENT — PAIN DESCRIPTION - LOCATION: LOCATION: LEG

## 2025-01-23 NOTE — PATIENT INSTRUCTIONS
Wound Care Center Physician Orders and Discharge Instructions  [facility]   [address]  Telephone: [phone] FAX [phone]  NAME:  Justin Baeza  YOB: 1965  MEDICAL RECORD NUMBER:  [id number]  DATE: 1/23/2025      Return Appointment:  Return Appointment: With Khang Giles MD  in 1 Week(s)  [x] Return Appointment for a Wound Assessment with the nurse on:    Future Appointments   Date Time Provider Department Center   2/24/2025  9:30 AM [facility] MEDICATION MANAGEMENT [facility]               Orders Placed This Encounter   Procedures    Initiate Outpatient Wound Care Protocol       Home Care Company: none    Medically necessary services for evaluation and treatment: []Skilled Nursing (using clean technique) []PT (Eval & Treat) []OT (Eval & Treat) []Social Work []Dietician []Other:      Wound care instructions:  If you smoke we ask that you refrain from smoking. Smoking inhibits wounds from healing.  When taking antibiotics take the entire prescription as ordered. Do not stop taking until medication is all gone unless otherwise instructed.   Exercise as tolerated.   Keep weight off wounds and reposition every 2 hours if applicable.  Do not get wounds wet in the bath or shower unless otherwise instructed by your physician. If your wound is on your foot or leg, you may purchase a cast bag. Please ask at the pharmacy.  Wash hands with soap and water prior to and after every dressing change.    [x]Wash wounds with: No need to wash. Leave dressing in place.    [x]Kiah wound Topical Treatments: Do not apply lotions, creams, or ointments to the skin around the wound bed unless directed as followed:   Apply around the wound:  zinc paste- applied by Dr. Giles        [x]Wound Location: right lower leg wounds  Apply Primary Dressing to wound: polymem AG  Secondary Dressing: Extra absorbant pad   Avoid contact of tape with skin if

## 2025-01-23 NOTE — PROGRESS NOTES
[facility]  Progress Note and Procedure Note      Justin Baeza  MEDICAL RECORD NUMBER:  [id number]  AGE: 59 y.o.   GENDER: male  : 1965  EPISODE DATE:  2025    Subjective:     Chief Complaint   Patient presents with    Wound Check     RLE         HISTORY of PRESENT ILLNESS HPI     Justin Baeza is a 59 y.o. male who presents today for wound/ulcer evaluation.   History of Wound Context: Patient continues follow-up for chronic venous insufficiency with ulceration and lymphedema right lower extremity. He had now been off his antibiotics for several weeks. Patient continues to be on his feet extended periods of time during the day. Little utilization of lymphedema pumps. Once again we have been using a PolyMem with silver primary dressing.  He is now on Bactrim as prescribed by Dr. Agustin from infectious disease for his Pseudomonas cultured from the wound.  Patient describes more drainage this past week.  Patient had been placed in primary collagen dressing with 4-layer compression  Wound/Ulcer Pain Timing/Severity: none  Quality of pain: N/A  Severity:  0 / 10   Modifying Factors: None  Associated Signs/Symptoms: edema and drainage    Ulcer Identification:  Ulcer Type: venous    Contributing Factors: edema, venous stasis, lymphedema, diabetes, obesity, and anticoagulation therapy    Acute Wound: N/A not an acute wound    PAST MEDICAL HISTORY        Diagnosis Date    DVT (deep venous thrombosis) (HCC)     Hx of blood clots     Pain and swelling of right lower leg        PAST SURGICAL HISTORY    Past Surgical History:   Procedure Laterality Date    BALLOON ANGIOPLASTY, ARTERY Right 2017    at [facility]    DILATATION, ESOPHAGUS      SKIN SPLIT GRAFT Right        FAMILY HISTORY    Family History   Problem Relation Age of Onset    Diabetes Mother     Heart Attack Father        SOCIAL HISTORY    Social History     Tobacco Use    Smoking status: Never    Smokeless tobacco:

## 2025-01-23 NOTE — PROGRESS NOTES
Multilayer Compression Wrap   (Not Unna) Below the Knee    NAME:  Justin Baeza  YOB: 1965  MEDICAL RECORD NUMBER:  [id number]  DATE:  1/23/2025       Removed old Multilayer wrap if present and washed leg with mild soap/water.   Applied moisturizing agent to dry skin as needed.    Applied primary and secondary dressing as ordered    Applied multilayered dressing below the knee to Right lower leg(s)  (4 Layer Compression Wrap ) .    Instructed patient/caregiver not to remove dressing and to keep it clean and dry.    Instructed patient/caregiver on complications to report to provider, such as pain, numbness in toes, heavy drainage, and slippage of dressing.    Instructed patient on purpose of compression dressing and on activity and exercise recommendations.   Applied per   Guidelines    Electronically signed by Kathy Gomez RN on 1/23/2025 at 9:39 AM

## 2025-01-30 ENCOUNTER — HOSPITAL ENCOUNTER (OUTPATIENT)
Dept: WOUND CARE | Age: 60
Discharge: HOME | End: 2025-01-30
Attending: SPECIALIST
Payer: COMMERCIAL

## 2025-01-30 VITALS — TEMPERATURE: 97.3 F | SYSTOLIC BLOOD PRESSURE: 129 MMHG | DIASTOLIC BLOOD PRESSURE: 76 MMHG

## 2025-01-30 DIAGNOSIS — L97.909 VENOUS ULCER (HCC): ICD-10-CM

## 2025-01-30 DIAGNOSIS — I89.0 LYMPHEDEMA: ICD-10-CM

## 2025-01-30 DIAGNOSIS — I87.331 IDIOPATHIC CHRONIC VENOUS HYPERTENSION OF RIGHT LOWER EXTREMITY WITH ULCER AND INFLAMMATION (HCC): ICD-10-CM

## 2025-01-30 DIAGNOSIS — I83.009 VENOUS ULCER (HCC): ICD-10-CM

## 2025-01-30 DIAGNOSIS — I89.0 CHRONIC ACQUIRED LYMPHEDEMA: Primary | ICD-10-CM

## 2025-01-30 DIAGNOSIS — Z91.199 NONCOMPLIANCE: ICD-10-CM

## 2025-01-30 LAB
25(OH)D3 SERPL-MCNC: 7.6 NG/ML
ALBUMIN SERPL-MCNC: 4.2 G/DL (ref 3.4–5)
ALBUMIN/GLOB SERPL: 1.4 {RATIO} (ref 1.1–2.2)
ALP SERPL-CCNC: 89 U/L (ref 40–129)
ALT SERPL-CCNC: 28 U/L (ref 10–40)
ANION GAP SERPL CALCULATED.3IONS-SCNC: 8 MMOL/L (ref 3–16)
AST SERPL-CCNC: 30 U/L (ref 15–37)
BILIRUB SERPL-MCNC: 0.3 MG/DL (ref 0–1)
BUN SERPL-MCNC: 18 MG/DL (ref 7–20)
CALCIUM SERPL-MCNC: 8.9 MG/DL (ref 8.3–10.6)
CHLORIDE SERPL-SCNC: 108 MMOL/L (ref 99–110)
CO2 SERPL-SCNC: 26 MMOL/L (ref 21–32)
CREAT SERPL-MCNC: 1 MG/DL (ref 0.9–1.3)
CRP SERPL-MCNC: 31.6 MG/L (ref 0–5.1)
DEPRECATED RDW RBC AUTO: 17.1 % (ref 12.4–15.4)
ERYTHROCYTE [SEDIMENTATION RATE] IN BLOOD BY WESTERGREN METHOD: 36 MM/HR (ref 0–20)
EST. AVERAGE GLUCOSE BLD GHB EST-MCNC: 122.6 MG/DL
GFR SERPLBLD CREATININE-BSD FMLA CKD-EPI: 86 ML/MIN/{1.73_M2}
GLUCOSE SERPL-MCNC: 98 MG/DL (ref 70–99)
HBA1C MFR BLD: 5.9 %
HCT VFR BLD AUTO: 38.3 % (ref 40.5–52.5)
HGB BLD-MCNC: 12.4 G/DL (ref 13.5–17.5)
MCH RBC QN AUTO: 28 PG (ref 26–34)
MCHC RBC AUTO-ENTMCNC: 32.5 G/DL (ref 31–36)
MCV RBC AUTO: 86.3 FL (ref 80–100)
PLATELET # BLD AUTO: 247 K/UL (ref 135–450)
PMV BLD AUTO: 6.7 FL (ref 5–10.5)
POTASSIUM SERPL-SCNC: 5.4 MMOL/L (ref 3.5–5.1)
PREALB SERPL-MCNC: 14.9 MG/DL (ref 20–40)
PROT SERPL-MCNC: 7.1 G/DL (ref 6.4–8.2)
RBC # BLD AUTO: 4.43 M/UL (ref 4.2–5.9)
SODIUM SERPL-SCNC: 142 MMOL/L (ref 136–145)
WBC # BLD AUTO: 5 K/UL (ref 4–11)

## 2025-01-30 PROCEDURE — 11045 DBRDMT SUBQ TISS EACH ADDL: CPT | Performed by: SPECIALIST

## 2025-01-30 PROCEDURE — 11042 DBRDMT SUBQ TIS 1ST 20SQCM/<: CPT

## 2025-01-30 PROCEDURE — 6370000000 HC RX 637 (ALT 250 FOR IP): Performed by: SPECIALIST

## 2025-01-30 PROCEDURE — 29581 APPL MULTLAYER CMPRN SYS LEG: CPT

## 2025-01-30 PROCEDURE — 11045 DBRDMT SUBQ TISS EACH ADDL: CPT

## 2025-01-30 PROCEDURE — 11042 DBRDMT SUBQ TIS 1ST 20SQCM/<: CPT | Performed by: SPECIALIST

## 2025-01-30 RX ORDER — BETAMETHASONE DIPROPIONATE 0.05 %
OINTMENT (GRAM) TOPICAL ONCE
OUTPATIENT
Start: 2025-01-30 | End: 2025-01-30

## 2025-01-30 RX ORDER — SILVER SULFADIAZINE 10 MG/G
CREAM TOPICAL ONCE
OUTPATIENT
Start: 2025-01-30 | End: 2025-01-30

## 2025-01-30 RX ORDER — NEOMYCIN/BACITRACIN/POLYMYXINB 3.5-400-5K
OINTMENT (GRAM) TOPICAL ONCE
OUTPATIENT
Start: 2025-01-30 | End: 2025-01-30

## 2025-01-30 RX ORDER — TRIAMCINOLONE ACETONIDE 1 MG/G
OINTMENT TOPICAL ONCE
OUTPATIENT
Start: 2025-01-30 | End: 2025-01-30

## 2025-01-30 RX ORDER — LIDOCAINE 40 MG/G
CREAM TOPICAL ONCE
Status: COMPLETED | OUTPATIENT
Start: 2025-01-30 | End: 2025-01-30

## 2025-01-30 RX ORDER — GINSENG 100 MG
CAPSULE ORAL ONCE
OUTPATIENT
Start: 2025-01-30 | End: 2025-01-30

## 2025-01-30 RX ORDER — MUPIROCIN 20 MG/G
OINTMENT TOPICAL ONCE
OUTPATIENT
Start: 2025-01-30 | End: 2025-01-30

## 2025-01-30 RX ORDER — SODIUM CHLOR/HYPOCHLOROUS ACID 0.033 %
SOLUTION, IRRIGATION IRRIGATION ONCE
OUTPATIENT
Start: 2025-01-30 | End: 2025-01-30

## 2025-01-30 RX ORDER — GENTAMICIN SULFATE 1 MG/G
OINTMENT TOPICAL ONCE
OUTPATIENT
Start: 2025-01-30 | End: 2025-01-30

## 2025-01-30 RX ORDER — BACITRACIN ZINC AND POLYMYXIN B SULFATE 500; 1000 [USP'U]/G; [USP'U]/G
OINTMENT TOPICAL ONCE
OUTPATIENT
Start: 2025-01-30 | End: 2025-01-30

## 2025-01-30 RX ORDER — LIDOCAINE HYDROCHLORIDE 40 MG/ML
SOLUTION TOPICAL ONCE
OUTPATIENT
Start: 2025-01-30 | End: 2025-01-30

## 2025-01-30 RX ORDER — LIDOCAINE 50 MG/G
OINTMENT TOPICAL ONCE
OUTPATIENT
Start: 2025-01-30 | End: 2025-01-30

## 2025-01-30 RX ORDER — LIDOCAINE HYDROCHLORIDE 20 MG/ML
JELLY TOPICAL ONCE
OUTPATIENT
Start: 2025-01-30 | End: 2025-01-30

## 2025-01-30 RX ORDER — CLOBETASOL PROPIONATE 0.5 MG/G
OINTMENT TOPICAL ONCE
OUTPATIENT
Start: 2025-01-30 | End: 2025-01-30

## 2025-01-30 RX ORDER — LIDOCAINE 40 MG/G
CREAM TOPICAL ONCE
OUTPATIENT
Start: 2025-01-30 | End: 2025-01-30

## 2025-01-30 RX ADMIN — LIDOCAINE: 40 CREAM TOPICAL at 08:45

## 2025-01-30 NOTE — PROGRESS NOTES
[facility]  Progress Note and Procedure Note      Justin Baeza  MEDICAL RECORD NUMBER:  [id number]  AGE: 59 y.o.   GENDER: male  : 1965  EPISODE DATE:  2025    Subjective:     Chief Complaint   Patient presents with    Wound Check     RLE         HISTORY of PRESENT ILLNESS HPI     Justin Baeza is a 59 y.o. male who presents today for wound/ulcer evaluation.   History of Wound Context: Patient continues follow-up for chronic venous insufficiency with ulceration and lymphedema right lower extremity. He had now been off his antibiotics for several weeks. Patient continues to be on his feet extended periods of time during the day. Little utilization of lymphedema pumps. Once again we have been using a PolyMem with silver primary dressing.   Wound/Ulcer Pain Timing/Severity: none  Quality of pain: N/A  Severity:  0 / 10   Modifying Factors: None  Associated Signs/Symptoms: edema and drainage    Ulcer Identification:  Ulcer Type: venous    Contributing Factors: edema, venous stasis, lymphedema, obesity, and anticoagulation therapy    Acute Wound: N/A not an acute wound    PAST MEDICAL HISTORY        Diagnosis Date    DVT (deep venous thrombosis) (HCC)     Hx of blood clots     Pain and swelling of right lower leg        PAST SURGICAL HISTORY    Past Surgical History:   Procedure Laterality Date    BALLOON ANGIOPLASTY, ARTERY Right 2017    at [facility]    DILATATION, ESOPHAGUS      SKIN SPLIT GRAFT Right        FAMILY HISTORY    Family History   Problem Relation Age of Onset    Diabetes Mother     Heart Attack Father        SOCIAL HISTORY    Social History     Tobacco Use    Smoking status: Never    Smokeless tobacco: Never   Vaping Use    Vaping status: Never Used   Substance Use Topics    Alcohol use: No    Drug use: No       ALLERGIES    No Known Allergies    MEDICATIONS    Current Outpatient Medications on File Prior to Encounter   Medication Sig Dispense Refill

## 2025-01-30 NOTE — PROGRESS NOTES
Multilayer Compression Wrap   (Not Unna) Below the Knee    NAME:  Justin Baeza  YOB: 1965  MEDICAL RECORD NUMBER:  [id number]  DATE:  1/30/2025       Removed old Multilayer wrap if present and washed leg with mild soap/water.   Applied moisturizing agent to dry skin as needed.    Applied primary and secondary dressing as ordered    Applied multilayered dressing below the knee to Right lower leg(s)  (4 Layer Compression Wrap ) .    Instructed patient/caregiver not to remove dressing and to keep it clean and dry.    Instructed patient/caregiver on complications to report to provider, such as pain, numbness in toes, heavy drainage, and slippage of dressing.    Instructed patient on purpose of compression dressing and on activity and exercise recommendations.   Applied per   Guidelines    Electronically signed by Kathy Gomez RN on 1/30/2025 at 9:32 AM

## 2025-01-30 NOTE — PATIENT INSTRUCTIONS
Wound Care Center Physician Orders and Discharge Instructions  [facility]   [address]  Telephone: [phone] FAX [phone]  NAME:  Justin Baeza  YOB: 1965  MEDICAL RECORD NUMBER:  [id number]  DATE: 1/30/2025      Return Appointment:  Return Appointment: With Khang Giles MD  in 1 Week(s)  [x] Return Appointment for a Wound Assessment with the nurse on:    Future Appointments   Date Time Provider Department Center   2/24/2025  9:30 AM [facility] MEDICATION MANAGEMENT [facility]               Orders Placed This Encounter   Procedures    Initiate Outpatient Wound Care Protocol    Vitamin D 25 Hydroxy    CBC    Hemoglobin A1C    Comprehensive Metabolic Panel    Prealbumin    Sedimentation Rate    C-Reactive Protein       Home Care Company: none    Medically necessary services for evaluation and treatment: []Skilled Nursing (using clean technique) []PT (Eval & Treat) []OT (Eval & Treat) []Social Work []Dietician []Other:      Wound care instructions:  If you smoke we ask that you refrain from smoking. Smoking inhibits wounds from healing.  When taking antibiotics take the entire prescription as ordered. Do not stop taking until medication is all gone unless otherwise instructed.   Exercise as tolerated.   Keep weight off wounds and reposition every 2 hours if applicable.  Do not get wounds wet in the bath or shower unless otherwise instructed by your physician. If your wound is on your foot or leg, you may purchase a cast bag. Please ask at the pharmacy.  Wash hands with soap and water prior to and after every dressing change.    [x]Wash wounds with: No need to wash. Leave dressing in place.    [x]Kiah wound Topical Treatments: Do not apply lotions, creams, or ointments to the skin around the wound bed unless directed as followed:   Apply around the wound:  zinc paste- applied by Dr. Giles        [x]Wound Location: right lower leg

## 2025-01-31 NOTE — PLAN OF CARE
Compression Garments    Supply Company for Compression Stockings:     [employer] [address] [place] [address] f: [phone] f: [phone] p: [phone] [email or web address]      Ordering Center:     [facility] [place] [address] [address]  FAX NUMBER: [phone]    Patient Information:      Justin Baeza  [address]  [facility] [address]   [phone]   : 1965  AGE: 59 y.o.     GENDER: male   TODAYS DATE:  2025    Insurance:      PRIMARY INSURANCE:  Plan: BCBS [place]  Coverage[place]  Effective Date: 2025  Group Number: [unfilled]  Subscriber Number: [id number] - ([place] [employer])    SECONDARY INSURANCE:  Plan:   Coverage:   Effective Date:   [unfilled]    [unfilled]     [unfilled]   [unfilled]     Patient Information:      Problem List Items Addressed This Visit          Circulatory    Idiopathic chronic venous hypertension of right lower extremity with ulcer and inflammation (HCC)    Relevant Orders    Vitamin D 25 Hydroxy (Completed)    CBC (Completed)    Hemoglobin A1C (Completed)    Comprehensive Metabolic Panel (Completed)    Prealbumin (Completed)    Sedimentation Rate (Completed)    C-Reactive Protein (Completed)       Other    Lymphedema    Chronic acquired lymphedema - Primary    Venous ulcer (HCC)    Noncompliance     ICD 10: I89  Wound 22 Leg Right;Lower;Medial;Proximal #1 (Active)   Wound Image   25 0822   Wound Etiology Venous 24 0822   Dressing Status New dressing applied 24 0843   Wound Cleansed Vashe 25 0901   Dressing/Treatment Foam impregnated with Ag 25 0931   Wound Length (cm) 1.4 cm 25 0818   Wound Width (cm) 1.5 cm 25 0818   Wound Depth (cm) 0.1 cm 25 0818   Wound Surface Area (cm^2) 2.1 cm^2 25 0818   Change in Wound Size % (l*w) 91.12 25 0818   Wound Volume (cm^3) 0.21 cm^3 25

## 2025-02-06 ENCOUNTER — HOSPITAL ENCOUNTER (OUTPATIENT)
Dept: WOUND CARE | Age: 60
Discharge: HOME | End: 2025-02-06
Attending: SPECIALIST
Payer: COMMERCIAL

## 2025-02-06 VITALS — SYSTOLIC BLOOD PRESSURE: 131 MMHG | DIASTOLIC BLOOD PRESSURE: 80 MMHG | TEMPERATURE: 97.3 F | HEART RATE: 80 BPM

## 2025-02-06 DIAGNOSIS — I89.0 LYMPHEDEMA: ICD-10-CM

## 2025-02-06 DIAGNOSIS — I83.009 VENOUS ULCER (HCC): ICD-10-CM

## 2025-02-06 DIAGNOSIS — I87.331 IDIOPATHIC CHRONIC VENOUS HYPERTENSION OF RIGHT LOWER EXTREMITY WITH ULCER AND INFLAMMATION (HCC): ICD-10-CM

## 2025-02-06 DIAGNOSIS — L97.909 VENOUS ULCER (HCC): ICD-10-CM

## 2025-02-06 DIAGNOSIS — Z91.199 NONCOMPLIANCE: ICD-10-CM

## 2025-02-06 DIAGNOSIS — I89.0 CHRONIC ACQUIRED LYMPHEDEMA: Primary | ICD-10-CM

## 2025-02-06 PROCEDURE — 29581 APPL MULTLAYER CMPRN SYS LEG: CPT

## 2025-02-06 PROCEDURE — 11045 DBRDMT SUBQ TISS EACH ADDL: CPT

## 2025-02-06 PROCEDURE — 11042 DBRDMT SUBQ TIS 1ST 20SQCM/<: CPT

## 2025-02-06 PROCEDURE — 6370000000 HC RX 637 (ALT 250 FOR IP): Performed by: SPECIALIST

## 2025-02-06 RX ORDER — BACITRACIN ZINC AND POLYMYXIN B SULFATE 500; 1000 [USP'U]/G; [USP'U]/G
OINTMENT TOPICAL ONCE
OUTPATIENT
Start: 2025-02-06 | End: 2025-02-06

## 2025-02-06 RX ORDER — LIDOCAINE 50 MG/G
OINTMENT TOPICAL ONCE
OUTPATIENT
Start: 2025-02-06 | End: 2025-02-06

## 2025-02-06 RX ORDER — CLOBETASOL PROPIONATE 0.5 MG/G
OINTMENT TOPICAL ONCE
OUTPATIENT
Start: 2025-02-06 | End: 2025-02-06

## 2025-02-06 RX ORDER — GINSENG 100 MG
CAPSULE ORAL ONCE
OUTPATIENT
Start: 2025-02-06 | End: 2025-02-06

## 2025-02-06 RX ORDER — LIDOCAINE HYDROCHLORIDE 20 MG/ML
JELLY TOPICAL ONCE
OUTPATIENT
Start: 2025-02-06 | End: 2025-02-06

## 2025-02-06 RX ORDER — GENTAMICIN SULFATE 1 MG/G
OINTMENT TOPICAL ONCE
OUTPATIENT
Start: 2025-02-06 | End: 2025-02-06

## 2025-02-06 RX ORDER — LIDOCAINE 40 MG/G
CREAM TOPICAL ONCE
OUTPATIENT
Start: 2025-02-06 | End: 2025-02-06

## 2025-02-06 RX ORDER — LIDOCAINE HYDROCHLORIDE 40 MG/ML
SOLUTION TOPICAL ONCE
OUTPATIENT
Start: 2025-02-06 | End: 2025-02-06

## 2025-02-06 RX ORDER — TRIAMCINOLONE ACETONIDE 1 MG/G
OINTMENT TOPICAL ONCE
OUTPATIENT
Start: 2025-02-06 | End: 2025-02-06

## 2025-02-06 RX ORDER — MUPIROCIN 20 MG/G
OINTMENT TOPICAL ONCE
OUTPATIENT
Start: 2025-02-06 | End: 2025-02-06

## 2025-02-06 RX ORDER — BETAMETHASONE DIPROPIONATE 0.05 %
OINTMENT (GRAM) TOPICAL ONCE
OUTPATIENT
Start: 2025-02-06 | End: 2025-02-06

## 2025-02-06 RX ORDER — SODIUM CHLOR/HYPOCHLOROUS ACID 0.033 %
SOLUTION, IRRIGATION IRRIGATION ONCE
OUTPATIENT
Start: 2025-02-06 | End: 2025-02-06

## 2025-02-06 RX ORDER — NEOMYCIN/BACITRACIN/POLYMYXINB 3.5-400-5K
OINTMENT (GRAM) TOPICAL ONCE
OUTPATIENT
Start: 2025-02-06 | End: 2025-02-06

## 2025-02-06 RX ORDER — LIDOCAINE HYDROCHLORIDE 40 MG/ML
SOLUTION TOPICAL ONCE
Status: COMPLETED | OUTPATIENT
Start: 2025-02-06 | End: 2025-02-06

## 2025-02-06 RX ORDER — SILVER SULFADIAZINE 10 MG/G
CREAM TOPICAL ONCE
OUTPATIENT
Start: 2025-02-06 | End: 2025-02-06

## 2025-02-06 RX ADMIN — LIDOCAINE HYDROCHLORIDE: 40 SOLUTION TOPICAL at 08:45

## 2025-02-06 NOTE — PROGRESS NOTES
Multilayer Compression Wrap   (Not Unna) Below the Knee    NAME:  Justin Baeza  YOB: 1965  MEDICAL RECORD NUMBER:  [id number]  DATE:  2/6/2025       Removed old Multilayer wrap if present and washed leg with mild soap/water.   Applied moisturizing agent to dry skin as needed.    Applied primary and secondary dressing as ordered    Applied multilayered dressing below the knee to Right lower leg(s)  (4 Layer Compression Wrap ) .    Instructed patient/caregiver not to remove dressing and to keep it clean and dry.    Instructed patient/caregiver on complications to report to provider, such as pain, numbness in toes, heavy drainage, and slippage of dressing.    Instructed patient on purpose of compression dressing and on activity and exercise recommendations.   Applied per   Guidelines    Electronically signed by Kathy Gomez RN on 2/6/2025 at 9:44 AM      
Description not for Pressure Injury Full thickness 02/06/25 0826   Number of days: 927       Wound 07/25/22 Leg Right;Anterior;Lower #2 (Active)   Wound Image   02/06/25 0826   Wound Etiology Venous 02/06/25 0826   Dressing Status New dressing applied 02/08/24 0843   Wound Cleansed Vashe 01/30/25 0901   Dressing/Treatment Foam impregnated with Ag 02/06/25 0943   Dressing Change Due 02/03/24 12/30/24 0810   Wound Length (cm) 2 cm 02/06/25 0826   Wound Width (cm) 3.5 cm 02/06/25 0826   Wound Depth (cm) 0.1 cm 02/06/25 0826   Wound Surface Area (cm^2) 7 cm^2 02/06/25 0826   Change in Wound Size % (l*w) 84.65 02/06/25 0826   Wound Volume (cm^3) 0.7 cm^3 02/06/25 0826   Wound Healing % 85 02/06/25 0826   Post-Procedure Length (cm) 2.1 cm 02/06/25 0909   Post-Procedure Width (cm) 3.6 cm 02/06/25 0909   Post-Procedure Depth (cm) 0.3 cm 02/06/25 0909   Post-Procedure Surface Area (cm^2) 7.56 cm^2 02/06/25 0909   Post-Procedure Volume (cm^3) 2.268 cm^3 02/06/25 0909   Distance Tunneling (cm) 0 cm 12/30/24 0810   Tunneling Position ___ O'Clock 0 06/06/24 0819   Undermining Starts ___ O'Clock 0 06/06/24 0819   Undermining Ends___ O'Clock 0 06/06/24 0819   Undermining Maxium Distance (cm) 0 12/30/24 0810   Wound Assessment Bleeding;Pink/red 02/06/25 0826   Drainage Amount Large (50-75% saturated) 02/06/25 0826   Drainage Description Brown;Yellow 02/06/25 0826   Odor Moderate 02/06/25 0826   Kiah-wound Assessment Maceration 02/06/25 0826   Margins Defined edges 02/06/25 0826   Wound Thickness Description not for Pressure Injury Full thickness 02/06/25 0826   Number of days: 927       Wound 07/25/22 Leg Right;Posterior;Lower #3 cluster (Active)   Wound Image   02/06/25 0826   Wound Etiology Venous 02/06/25 0826   Dressing Status New dressing applied 02/08/24 0843   Wound Cleansed Vashe 01/30/25 0901   Dressing/Treatment Foam impregnated with Ag 02/06/25 0943   Wound Length (cm) 3 cm 02/06/25 0826   Wound Width (cm) 8 cm 02/06/25

## 2025-02-10 ENCOUNTER — TELEPHONE (OUTPATIENT)
Dept: INFECTIOUS DISEASES | Age: 60
End: 2025-02-10

## 2025-02-10 ENCOUNTER — HOSPITAL ENCOUNTER (OUTPATIENT)
Dept: WOUND CARE | Age: 60
Discharge: HOME | End: 2025-02-10
Attending: SPECIALIST
Payer: COMMERCIAL

## 2025-02-10 ENCOUNTER — TELEMEDICINE (OUTPATIENT)
Dept: INFECTIOUS DISEASES | Age: 60
End: 2025-02-10
Payer: COMMERCIAL

## 2025-02-10 DIAGNOSIS — L97.909 VENOUS ULCER (HCC): ICD-10-CM

## 2025-02-10 DIAGNOSIS — I83.009 VENOUS ULCER (HCC): ICD-10-CM

## 2025-02-10 DIAGNOSIS — Z91.199 NONCOMPLIANCE: ICD-10-CM

## 2025-02-10 DIAGNOSIS — I87.331 IDIOPATHIC CHRONIC VENOUS HYPERTENSION OF RIGHT LOWER EXTREMITY WITH ULCER AND INFLAMMATION (HCC): Primary | ICD-10-CM

## 2025-02-10 DIAGNOSIS — I89.0 LYMPHEDEMA: ICD-10-CM

## 2025-02-10 DIAGNOSIS — I87.311 IDIOPATHIC CHRONIC VENOUS HYPERTENSION OF RIGHT LOWER EXTREMITY WITH ULCER (HCC): ICD-10-CM

## 2025-02-10 DIAGNOSIS — I89.0 CHRONIC ACQUIRED LYMPHEDEMA: ICD-10-CM

## 2025-02-10 DIAGNOSIS — A49.8 PSEUDOMONAS INFECTION: Primary | ICD-10-CM

## 2025-02-10 DIAGNOSIS — L97.919 LEG ULCER, RIGHT, WITH UNSPECIFIED SEVERITY (HCC): ICD-10-CM

## 2025-02-10 DIAGNOSIS — L97.919 IDIOPATHIC CHRONIC VENOUS HYPERTENSION OF RIGHT LOWER EXTREMITY WITH ULCER (HCC): ICD-10-CM

## 2025-02-10 DIAGNOSIS — L03.115 CELLULITIS OF RIGHT LEG: Primary | ICD-10-CM

## 2025-02-10 DIAGNOSIS — A49.8 PSEUDOMONAS INFECTION: ICD-10-CM

## 2025-02-10 PROCEDURE — 29581 APPL MULTLAYER CMPRN SYS LEG: CPT

## 2025-02-10 PROCEDURE — 99214 OFFICE O/P EST MOD 30 MIN: CPT | Performed by: INTERNAL MEDICINE

## 2025-02-10 RX ORDER — LIDOCAINE 50 MG/G
OINTMENT TOPICAL ONCE
OUTPATIENT
Start: 2025-02-10 | End: 2025-02-10

## 2025-02-10 RX ORDER — AMPICILLIN 500 MG/1
500 CAPSULE ORAL 4 TIMES DAILY
Qty: 40 CAPSULE | Refills: 0 | Status: SHIPPED | OUTPATIENT
Start: 2025-02-10 | End: 2025-02-20

## 2025-02-10 RX ORDER — LIDOCAINE HYDROCHLORIDE 20 MG/ML
JELLY TOPICAL ONCE
OUTPATIENT
Start: 2025-02-10 | End: 2025-02-10

## 2025-02-10 RX ORDER — TRIAMCINOLONE ACETONIDE 1 MG/G
OINTMENT TOPICAL ONCE
OUTPATIENT
Start: 2025-02-10 | End: 2025-02-10

## 2025-02-10 RX ORDER — NEOMYCIN/BACITRACIN/POLYMYXINB 3.5-400-5K
OINTMENT (GRAM) TOPICAL ONCE
OUTPATIENT
Start: 2025-02-10 | End: 2025-02-10

## 2025-02-10 RX ORDER — SILVER SULFADIAZINE 10 MG/G
CREAM TOPICAL ONCE
OUTPATIENT
Start: 2025-02-10 | End: 2025-02-10

## 2025-02-10 RX ORDER — GINSENG 100 MG
CAPSULE ORAL ONCE
OUTPATIENT
Start: 2025-02-10 | End: 2025-02-10

## 2025-02-10 RX ORDER — SODIUM CHLOR/HYPOCHLOROUS ACID 0.033 %
SOLUTION, IRRIGATION IRRIGATION ONCE
OUTPATIENT
Start: 2025-02-10 | End: 2025-02-10

## 2025-02-10 RX ORDER — MUPIROCIN 20 MG/G
OINTMENT TOPICAL ONCE
OUTPATIENT
Start: 2025-02-10 | End: 2025-02-10

## 2025-02-10 RX ORDER — CLOBETASOL PROPIONATE 0.5 MG/G
OINTMENT TOPICAL ONCE
OUTPATIENT
Start: 2025-02-10 | End: 2025-02-10

## 2025-02-10 RX ORDER — BACITRACIN ZINC AND POLYMYXIN B SULFATE 500; 1000 [USP'U]/G; [USP'U]/G
OINTMENT TOPICAL ONCE
OUTPATIENT
Start: 2025-02-10 | End: 2025-02-10

## 2025-02-10 RX ORDER — LIDOCAINE 40 MG/G
CREAM TOPICAL ONCE
OUTPATIENT
Start: 2025-02-10 | End: 2025-02-10

## 2025-02-10 RX ORDER — BETAMETHASONE DIPROPIONATE 0.05 %
OINTMENT (GRAM) TOPICAL ONCE
OUTPATIENT
Start: 2025-02-10 | End: 2025-02-10

## 2025-02-10 RX ORDER — GENTAMICIN SULFATE 1 MG/G
OINTMENT TOPICAL ONCE
OUTPATIENT
Start: 2025-02-10 | End: 2025-02-10

## 2025-02-10 RX ORDER — LIDOCAINE HYDROCHLORIDE 40 MG/ML
SOLUTION TOPICAL ONCE
Status: CANCELLED | OUTPATIENT
Start: 2025-02-10 | End: 2025-02-10

## 2025-02-10 NOTE — PROGRESS NOTES
Chronic LE edema, L > R, chronic R LE wounds  Followed at [facility] - 1-2 times a week visits for compression wrap  Wound with intermittent superinfection -   - prior cult - E faecalis, MSSA, Ps aeruginosa   - last iv antibiotics 3/6/24, iv Zosyn    - last antibiotics 12/30/24 Bactrim    Wounds worse with increase drainage on 1/23/25  Cult sent from [facility] 1/23  Resulting Agency    Gram Stain Result No organisms seen  2+ WBC's (Polymorphonuclear)  1+ Epithelial Cells   Anaerobic Culture No anaerobes isolated   Organism Pseudomonas aeruginosa Abnormal    WOUND/ABSCESS Light growth  Multiple Resistant Drug Organism   Organism Enterococcus faecalis Abnormal    WOUND/ABSCESS Moderate growth   Organism Corynebacterium striatum Abnormal    WOUND/ABSCESS Light growth  No further workup        Pseudomonas aeruginosa (1)  Antibiotic Interpretation JIGNESH    cefepime Resistant >16 mcg/mL   cefTAZidime-avibactam Sensitive 8 mcg/mL   Ceftolozane/Tazobactam (Zerbaxa) Sensitive 4 mcg/mL   ciprofloxacin Resistant >2 mcg/mL   gentamicin Sensitive 4 mcg/mL   levofloxacin Resistant >4 mcg/mL   meropenem Sensitive <=1 mcg/mL   piperacillin-tazobactam Resistant >64 mcg/mL   tobramycin Sensitive <=2 mcg/mL     Enterococcus faecalis (2)  Antibiotic Interpretation JIGNESH    ampicillin Sensitive <=2 mcg/mL   ciprofloxacin Resistant >2 mcg/mL   levofloxacin Resistant >4 mcg/mL   vancomycin Sensitive 1 mcg/mL           1/30 Labs - ESR 37 (previous 17,15,16), CRP 31.6 (previous 21.4, 17.9), Cr 1.0  Started on po Ampicillin 500 mg qid x 10 d    Pt reports worse R leg pain, swelling and pain  Based on cult and sx, will treat.  Start Meropenem 3 gm a day CI x 14 d   PICC   First dose at  Infusion Center   Home infusion       Recommended Follow-up, Labs or Other Treatments After Discharge:    ID INFUSION ORDERS:    Meropenem 3 gm iv daily by CI through 2/26  - Diagnosis -  R leg wound infection  - First dose given in hospital  - Routine CVC care   - Labs:

## 2025-02-10 NOTE — PROGRESS NOTES
Multilayer Compression Wrap   (Not Unna) Below the Knee    NAME:  Justin Baeza  YOB: 1965  MEDICAL RECORD NUMBER:  [id number]  DATE:  2/10/2025       Removed old Multilayer wrap if present and washed leg with mild soap/water.   Applied moisturizing agent to dry skin as needed.    Applied primary and secondary dressing as ordered    Applied multilayered dressing below the knee to Right lower leg(s)  (4 Layer Compression Wrap ) .    Instructed patient/caregiver not to remove dressing and to keep it clean and dry.    Instructed patient/caregiver on complications to report to provider, such as pain, numbness in toes, heavy drainage, and slippage of dressing.    Instructed patient on purpose of compression dressing and on activity and exercise recommendations.   Applied per   Guidelines    Electronically signed by Kathy Gomez RN on 2/10/2025 at 8:03 AM

## 2025-02-10 NOTE — TELEPHONE ENCOUNTER
Contacted Opal, PICC nurse with [facility], and attempted to set up time for pt to come in for PICC placement.  Pt is to assigned to IR due to extreme difficulty placing PICC or Midline. (Jossue turcios)  IR was contact.  Pt is scheduled for .  Pt is on daily Coumadin and needs to stop taking coumadin on Tuesday.  Attempted to contact pt.  Left VM with all above information.  Will attempt to contact pt tomorrow a.m. in order to verify he did receive message and understands to stop his Coumadin after  dose.  My name, title, Dr Agustin's name, specialty and affiliation  Pt name  My direct number [phone]    Contacted Jinny with [employer] and gave all pt identifiers to run benefits.  Once I hear back from Jinny, I will contact [employer] pharmacist to give VO for IV ATB's. And find out who will follow pt tor [facility]  I pt does not qualify for [facility], I will arrange for pt to come to one fo the infusion suites for weekly dressing change to PICC and weekly labs     25  Spoke with pt.  He stated he did receive the message and understands to stop taking his coumadin after tomorrows dose  Pt asked if they had anything later on Monday or Tuesday so he can get to work on time and go straight from the hospital to work.  I explained that he will not be able to work after the procedure and will need a ride back and forth to hospital  Pt asked why, since he has had PICC lines in the past  I explained his veins have scar tissue in them that makes it difficult to place PICC and Midlines and the IR department has better imaging tools to place difficult lines  Pt verbalized understanding    Contacted IR and the latest they have on Monday is 0900 and they have  Tuesday  I took the 900 on Monday    Contact pt - he does not want the Monday at 9 - he now wants the 800 on Tuesday    Left  for IR stating Pt would like the 800 on Tuesday  My name, title, Dr Agustin's name, specialty and affiliation  Pt name and +  My direct

## 2025-02-13 ENCOUNTER — HOSPITAL ENCOUNTER (OUTPATIENT)
Dept: WOUND CARE | Age: 60
Discharge: HOME | End: 2025-02-13
Attending: SPECIALIST
Payer: COMMERCIAL

## 2025-02-13 VITALS — HEART RATE: 75 BPM | SYSTOLIC BLOOD PRESSURE: 128 MMHG | DIASTOLIC BLOOD PRESSURE: 74 MMHG | TEMPERATURE: 97.3 F

## 2025-02-13 DIAGNOSIS — L97.909 VENOUS ULCER (HCC): ICD-10-CM

## 2025-02-13 DIAGNOSIS — Z91.199 NONCOMPLIANCE: ICD-10-CM

## 2025-02-13 DIAGNOSIS — I83.009 VENOUS ULCER (HCC): ICD-10-CM

## 2025-02-13 DIAGNOSIS — I89.0 LYMPHEDEMA: ICD-10-CM

## 2025-02-13 DIAGNOSIS — I87.331 IDIOPATHIC CHRONIC VENOUS HYPERTENSION OF RIGHT LOWER EXTREMITY WITH ULCER AND INFLAMMATION (HCC): ICD-10-CM

## 2025-02-13 DIAGNOSIS — I89.0 CHRONIC ACQUIRED LYMPHEDEMA: Primary | ICD-10-CM

## 2025-02-13 PROCEDURE — 11042 DBRDMT SUBQ TIS 1ST 20SQCM/<: CPT

## 2025-02-13 PROCEDURE — 11045 DBRDMT SUBQ TISS EACH ADDL: CPT

## 2025-02-13 PROCEDURE — 11042 DBRDMT SUBQ TIS 1ST 20SQCM/<: CPT | Performed by: SPECIALIST

## 2025-02-13 PROCEDURE — 6370000000 HC RX 637 (ALT 250 FOR IP): Performed by: SPECIALIST

## 2025-02-13 PROCEDURE — 11045 DBRDMT SUBQ TISS EACH ADDL: CPT | Performed by: SPECIALIST

## 2025-02-13 PROCEDURE — 29581 APPL MULTLAYER CMPRN SYS LEG: CPT

## 2025-02-13 RX ORDER — LIDOCAINE HYDROCHLORIDE 20 MG/ML
JELLY TOPICAL ONCE
OUTPATIENT
Start: 2025-02-13 | End: 2025-02-13

## 2025-02-13 RX ORDER — LIDOCAINE HYDROCHLORIDE 40 MG/ML
SOLUTION TOPICAL ONCE
OUTPATIENT
Start: 2025-02-13 | End: 2025-02-13

## 2025-02-13 RX ORDER — NEOMYCIN/BACITRACIN/POLYMYXINB 3.5-400-5K
OINTMENT (GRAM) TOPICAL ONCE
OUTPATIENT
Start: 2025-02-13 | End: 2025-02-13

## 2025-02-13 RX ORDER — SILVER SULFADIAZINE 10 MG/G
CREAM TOPICAL ONCE
OUTPATIENT
Start: 2025-02-13 | End: 2025-02-13

## 2025-02-13 RX ORDER — GENTAMICIN SULFATE 1 MG/G
OINTMENT TOPICAL ONCE
OUTPATIENT
Start: 2025-02-13 | End: 2025-02-13

## 2025-02-13 RX ORDER — TRIAMCINOLONE ACETONIDE 1 MG/G
OINTMENT TOPICAL ONCE
OUTPATIENT
Start: 2025-02-13 | End: 2025-02-13

## 2025-02-13 RX ORDER — LIDOCAINE HYDROCHLORIDE 40 MG/ML
SOLUTION TOPICAL ONCE
Status: COMPLETED | OUTPATIENT
Start: 2025-02-13 | End: 2025-02-13

## 2025-02-13 RX ORDER — BACITRACIN ZINC AND POLYMYXIN B SULFATE 500; 1000 [USP'U]/G; [USP'U]/G
OINTMENT TOPICAL ONCE
OUTPATIENT
Start: 2025-02-13 | End: 2025-02-13

## 2025-02-13 RX ORDER — BETAMETHASONE DIPROPIONATE 0.05 %
OINTMENT (GRAM) TOPICAL ONCE
OUTPATIENT
Start: 2025-02-13 | End: 2025-02-13

## 2025-02-13 RX ORDER — MUPIROCIN 20 MG/G
OINTMENT TOPICAL ONCE
OUTPATIENT
Start: 2025-02-13 | End: 2025-02-13

## 2025-02-13 RX ORDER — CLOBETASOL PROPIONATE 0.5 MG/G
OINTMENT TOPICAL ONCE
OUTPATIENT
Start: 2025-02-13 | End: 2025-02-13

## 2025-02-13 RX ORDER — LIDOCAINE 50 MG/G
OINTMENT TOPICAL ONCE
OUTPATIENT
Start: 2025-02-13 | End: 2025-02-13

## 2025-02-13 RX ORDER — GINSENG 100 MG
CAPSULE ORAL ONCE
OUTPATIENT
Start: 2025-02-13 | End: 2025-02-13

## 2025-02-13 RX ORDER — LIDOCAINE 40 MG/G
CREAM TOPICAL ONCE
OUTPATIENT
Start: 2025-02-13 | End: 2025-02-13

## 2025-02-13 RX ORDER — SODIUM CHLOR/HYPOCHLOROUS ACID 0.033 %
SOLUTION, IRRIGATION IRRIGATION ONCE
OUTPATIENT
Start: 2025-02-13 | End: 2025-02-13

## 2025-02-13 RX ADMIN — LIDOCAINE HYDROCHLORIDE: 40 SOLUTION TOPICAL at 08:47

## 2025-02-13 NOTE — PROGRESS NOTES
[facility]  Progress Note and Procedure Note      Justin Baeza  MEDICAL RECORD NUMBER:  [id number]  AGE: 59 y.o.   GENDER: male  : 1965  EPISODE DATE:  2025    Subjective:     Chief Complaint   Patient presents with    Wound Check     Right lower leg         HISTORY of PRESENT ILLNESS HPI     Justin Baeza is a 59 y.o. male who presents today for wound/ulcer evaluation.   History of Wound Context: Patient continues follow-up for chronic venous insufficiency with ulceration and lymphedema right lower extremity. He had now been off his antibiotics for several weeks. Patient continues to be on his feet extended periods of time during the day. Little utilization of lymphedema pumps. Once again we have been using a PolyMem with silver primary dressing.  Patient describes more drainage this past week.  Patient has scheduled appointment for placement of PICC line and administration of IV antibiotics as ordered by Dr. Agustin from infectious disease for Pseudomonas.  He remains on ampicillin at the present time for Enterococcus faecalis  Wound/Ulcer Pain Timing/Severity: none  Quality of pain: N/A  Severity:  0 / 10   Modifying Factors: None  Associated Signs/Symptoms: edema and drainage    Ulcer Identification:  Ulcer Type: venous    Contributing Factors: edema, venous stasis, lymphedema, obesity, and anticoagulation therapy    Acute Wound: N/A not an acute wound    PAST MEDICAL HISTORY        Diagnosis Date    Difficult intravenous access     not bedside candidate for PICC lines both arms no thread, needs IR or tunneled    DVT (deep venous thrombosis) (HCC)     Hx of blood clots     Pain and swelling of right lower leg        PAST SURGICAL HISTORY    Past Surgical History:   Procedure Laterality Date    BALLOON ANGIOPLASTY, ARTERY Right 2017    at [facility]    DILATATION, ESOPHAGUS      SKIN SPLIT GRAFT Right        FAMILY HISTORY    Family History   Problem Relation Age of

## 2025-02-13 NOTE — PATIENT INSTRUCTIONS
Wound Care Center Physician Orders and Discharge Instructions  [facility]   [address] [place]  Telephone: [phone] FAX [phone]  NAME:  Justin Baeza  YOB: 1965  MEDICAL RECORD NUMBER:  [id number]  DATE: 2/13/2025      Return Appointment:  Return Appointment: With Khang Giles MD  in 1 Week(s)  [x] Return Appointment for a Wound Assessment with the nurse on:02/17/25    Future Appointments   Date Time Provider Department Center   2/18/2025  8:00 AM [facility] SPECIAL PROCEDURES 1 [facility] SP PROC [facility] Radio   2/24/2025  9:30 AM [facility] MEDICATION MANAGEMENT TJHZ MM [facility]               Orders Placed This Encounter   Procedures    Initiate Outpatient Wound Care Protocol       Home Care Company: none    Medically necessary services for evaluation and treatment: []Skilled Nursing (using clean technique) []PT (Eval & Treat) []OT (Eval & Treat) []Social Work []Dietician []Other:      Wound care instructions:  If you smoke we ask that you refrain from smoking. Smoking inhibits wounds from healing.  When taking antibiotics take the entire prescription as ordered. Do not stop taking until medication is all gone unless otherwise instructed.   Exercise as tolerated.   Keep weight off wounds and reposition every 2 hours if applicable.  Do not get wounds wet in the bath or shower unless otherwise instructed by your physician. If your wound is on your foot or leg, you may purchase a cast bag. Please ask at the pharmacy.  Wash hands with soap and water prior to and after every dressing change.    [x]Wash wounds with: No need to wash. Leave dressing in place.    [x]Kiah wound Topical Treatments: Do not apply lotions, creams, or ointments to the skin around the wound bed unless directed as followed:   Apply around the wound:  zinc paste- applied by Dr. Giles        [x]Wound Location: right lower leg wounds  Apply Primary Dressing to wound: polymem AG  Secondary

## 2025-02-13 NOTE — PROGRESS NOTES
Multilayer Compression Wrap   (Not Unna) Below the Knee    NAME:  Justin Baeza  YOB: 1965  MEDICAL RECORD NUMBER:  [id number]  DATE:  2/13/2025       Removed old Multilayer wrap if present and washed leg with mild soap/water.   Applied moisturizing agent to dry skin as needed.    Applied primary and secondary dressing as ordered    Applied multilayered dressing below the knee to Right lower leg(s)  (4 Layer Compression Wrap ) .    Instructed patient/caregiver not to remove dressing and to keep it clean and dry.    Instructed patient/caregiver on complications to report to provider, such as pain, numbness in toes, heavy drainage, and slippage of dressing.    Instructed patient on purpose of compression dressing and on activity and exercise recommendations.   Applied per   Guidelines    Electronically signed by Kathy Gomez RN on 2/13/2025 at 9:08 AM

## 2025-02-17 ENCOUNTER — HOSPITAL ENCOUNTER (OUTPATIENT)
Dept: WOUND CARE | Age: 60
Discharge: HOME | End: 2025-02-17
Attending: SPECIALIST
Payer: COMMERCIAL

## 2025-02-17 VITALS
DIASTOLIC BLOOD PRESSURE: 84 MMHG | HEART RATE: 83 BPM | SYSTOLIC BLOOD PRESSURE: 146 MMHG | TEMPERATURE: 96.8 F | RESPIRATION RATE: 16 BRPM

## 2025-02-17 DIAGNOSIS — I83.009 VENOUS ULCER (HCC): ICD-10-CM

## 2025-02-17 DIAGNOSIS — Z91.199 NONCOMPLIANCE: ICD-10-CM

## 2025-02-17 DIAGNOSIS — I89.0 CHRONIC ACQUIRED LYMPHEDEMA: Primary | ICD-10-CM

## 2025-02-17 DIAGNOSIS — I89.0 LYMPHEDEMA: ICD-10-CM

## 2025-02-17 DIAGNOSIS — M79.89 PAIN AND SWELLING OF RIGHT LOWER LEG: Primary | ICD-10-CM

## 2025-02-17 DIAGNOSIS — I87.331 IDIOPATHIC CHRONIC VENOUS HYPERTENSION OF RIGHT LOWER EXTREMITY WITH ULCER AND INFLAMMATION (HCC): ICD-10-CM

## 2025-02-17 DIAGNOSIS — L97.909 VENOUS ULCER (HCC): ICD-10-CM

## 2025-02-17 DIAGNOSIS — M79.661 PAIN AND SWELLING OF RIGHT LOWER LEG: Primary | ICD-10-CM

## 2025-02-17 PROCEDURE — 29584 APPL MLTLAY CMPRN SYS UP ARM: CPT

## 2025-02-17 RX ORDER — SODIUM CHLORIDE 0.9 % (FLUSH) 0.9 %
5-40 SYRINGE (ML) INJECTION PRN
Status: CANCELLED | OUTPATIENT
Start: 2025-02-18

## 2025-02-17 RX ORDER — CLOBETASOL PROPIONATE 0.5 MG/G
OINTMENT TOPICAL ONCE
OUTPATIENT
Start: 2025-02-17 | End: 2025-02-17

## 2025-02-17 RX ORDER — GINSENG 100 MG
CAPSULE ORAL ONCE
OUTPATIENT
Start: 2025-02-17 | End: 2025-02-17

## 2025-02-17 RX ORDER — LIDOCAINE HYDROCHLORIDE 20 MG/ML
JELLY TOPICAL ONCE
OUTPATIENT
Start: 2025-02-17 | End: 2025-02-17

## 2025-02-17 RX ORDER — GENTAMICIN SULFATE 1 MG/G
OINTMENT TOPICAL ONCE
OUTPATIENT
Start: 2025-02-17 | End: 2025-02-17

## 2025-02-17 RX ORDER — ONDANSETRON 2 MG/ML
8 INJECTION INTRAMUSCULAR; INTRAVENOUS
Status: CANCELLED | OUTPATIENT
Start: 2025-02-18

## 2025-02-17 RX ORDER — MUPIROCIN 20 MG/G
OINTMENT TOPICAL ONCE
OUTPATIENT
Start: 2025-02-17 | End: 2025-02-17

## 2025-02-17 RX ORDER — ALBUTEROL SULFATE 90 UG/1
4 INHALANT RESPIRATORY (INHALATION) PRN
Status: CANCELLED | OUTPATIENT
Start: 2025-02-18

## 2025-02-17 RX ORDER — EPINEPHRINE 1 MG/ML
0.3 INJECTION, SOLUTION INTRAMUSCULAR; SUBCUTANEOUS PRN
Status: CANCELLED | OUTPATIENT
Start: 2025-02-18

## 2025-02-17 RX ORDER — HEPARIN 100 UNIT/ML
500 SYRINGE INTRAVENOUS PRN
Status: CANCELLED | OUTPATIENT
Start: 2025-02-18

## 2025-02-17 RX ORDER — LIDOCAINE HYDROCHLORIDE 40 MG/ML
SOLUTION TOPICAL ONCE
Status: CANCELLED | OUTPATIENT
Start: 2025-02-17 | End: 2025-02-17

## 2025-02-17 RX ORDER — HYDROCORTISONE SODIUM SUCCINATE 100 MG/2ML
100 INJECTION INTRAMUSCULAR; INTRAVENOUS
Status: CANCELLED | OUTPATIENT
Start: 2025-02-18

## 2025-02-17 RX ORDER — BACITRACIN ZINC AND POLYMYXIN B SULFATE 500; 1000 [USP'U]/G; [USP'U]/G
OINTMENT TOPICAL ONCE
OUTPATIENT
Start: 2025-02-17 | End: 2025-02-17

## 2025-02-17 RX ORDER — SODIUM CHLORIDE 9 MG/ML
5-250 INJECTION, SOLUTION INTRAVENOUS PRN
Status: CANCELLED | OUTPATIENT
Start: 2025-02-18

## 2025-02-17 RX ORDER — LIDOCAINE 40 MG/G
CREAM TOPICAL ONCE
OUTPATIENT
Start: 2025-02-17 | End: 2025-02-17

## 2025-02-17 RX ORDER — SODIUM CHLOR/HYPOCHLOROUS ACID 0.033 %
SOLUTION, IRRIGATION IRRIGATION ONCE
OUTPATIENT
Start: 2025-02-17 | End: 2025-02-17

## 2025-02-17 RX ORDER — TRIAMCINOLONE ACETONIDE 1 MG/G
OINTMENT TOPICAL ONCE
OUTPATIENT
Start: 2025-02-17 | End: 2025-02-17

## 2025-02-17 RX ORDER — NEOMYCIN/BACITRACIN/POLYMYXINB 3.5-400-5K
OINTMENT (GRAM) TOPICAL ONCE
OUTPATIENT
Start: 2025-02-17 | End: 2025-02-17

## 2025-02-17 RX ORDER — DIPHENHYDRAMINE HYDROCHLORIDE 50 MG/ML
50 INJECTION INTRAMUSCULAR; INTRAVENOUS
Status: CANCELLED | OUTPATIENT
Start: 2025-02-18

## 2025-02-17 RX ORDER — SILVER SULFADIAZINE 10 MG/G
CREAM TOPICAL ONCE
OUTPATIENT
Start: 2025-02-17 | End: 2025-02-17

## 2025-02-17 RX ORDER — LIDOCAINE 50 MG/G
OINTMENT TOPICAL ONCE
OUTPATIENT
Start: 2025-02-17 | End: 2025-02-17

## 2025-02-17 RX ORDER — ACETAMINOPHEN 325 MG/1
650 TABLET ORAL
Status: CANCELLED | OUTPATIENT
Start: 2025-02-18

## 2025-02-17 RX ORDER — BETAMETHASONE DIPROPIONATE 0.05 %
OINTMENT (GRAM) TOPICAL ONCE
OUTPATIENT
Start: 2025-02-17 | End: 2025-02-17

## 2025-02-17 RX ORDER — SODIUM CHLORIDE 9 MG/ML
INJECTION, SOLUTION INTRAVENOUS CONTINUOUS
Status: CANCELLED | OUTPATIENT
Start: 2025-02-18

## 2025-02-17 ASSESSMENT — PAIN SCALES - GENERAL: PAINLEVEL_OUTOF10: 7

## 2025-02-17 ASSESSMENT — PAIN DESCRIPTION - DESCRIPTORS: DESCRIPTORS: ACHING;BURNING

## 2025-02-17 ASSESSMENT — PAIN DESCRIPTION - ORIENTATION: ORIENTATION: RIGHT

## 2025-02-17 ASSESSMENT — PAIN DESCRIPTION - LOCATION: LOCATION: LEG

## 2025-02-17 NOTE — PROGRESS NOTES
Multilayer Compression Wrap   (Not Unna) Below the Knee    NAME:  Justin Baeza  YOB: 1965  MEDICAL RECORD NUMBER:  [id number]  DATE:  2/17/2025       Removed old Multilayer wrap if present and washed leg with mild soap/water.   Applied moisturizing agent to dry skin as needed.    Applied primary and secondary dressing as ordered    Applied multilayered dressing below the knee to Right lower leg(s)  (4 Layer Compression Wrap ) .    Instructed patient/caregiver not to remove dressing and to keep it clean and dry.    Instructed patient/caregiver on complications to report to provider, such as pain, numbness in toes, heavy drainage, and slippage of dressing.    Instructed patient on purpose of compression dressing and on activity and exercise recommendations.   Applied per   Guidelines    Electronically signed by Kathy Gomez RN on 2/17/2025 at 9:09 AM

## 2025-02-17 NOTE — PATIENT INSTRUCTIONS
Wound Care Center Physician Orders and Discharge Instructions  [facility]  [address] [facility] [place] [id number]  Telephone: [phone]      FAX [phone]    NAME:  Justin Baeza  YOB: 1965  MEDICAL RECORD NUMBER:  [id number]  DATE:  2/17/2025      Wound care:  Continue to follow the instructions and recommendations from your last doctor visit.  The dressing(s) applied is the same as your last visit. Please refer to your last discharge instruction for the information on your wound care.      If there were any changes made, please follow the instructions as written here:     Future Appointments     Future Appointments   Date Time Provider Department Center   2/18/2025 10:00 AM [facility] SPECIAL PROCEDURES 1 TJ SP PROC [facility] Radio   2/18/2025 11:00 AM [facility] SEMI INFUSION 8 [facility] INF None   2/20/2025  8:15 AM Khang Giles MD [facility] WOUND [facility]   2/24/2025  9:30 AM [facility] MEDICATION MANAGEMENT [facility] MM [facility]           Your nurse  is:  Yesenia       Electronically signed by Kathy Gomez RN on 2/17/2025 at 9:10 AM     Wound Care Center Information: Should you experience any significant changes in your wound(s) or have questions about your wound care, please contact the [facility] at [phone]. Our hours vary so please leave a message. Please give us 24-48 hours to return your call.   If you need help with your wounds and cannot wait until we are available, contact your PCP or go to the hospital emergency room.       Physician orders by:  Dr. Giles        The information contained in the After Visit Summary has been reviewed with me, the patient and/or responsible adult, by my health care provider(s).  I had the opportunity to ask questions regarding this information.  I have elected to receive;      [] Patient unable to sign Discharge Instructions. Given to ECF/Transportation/POA

## 2025-02-17 NOTE — TELEPHONE ENCOUNTER
Contacted [facility] and gave VO for Meropenem 1 gm Q 8 hours.  Weekly labs of CBC with Diff, CMP, ESR and CRP, every Monday or Tuesday with results faxed to [phone]  Talia, clinical phamacist ELLEN correctly    Contacted [facility], spoke with May, she asked I fax the orders for labs and 1st dose to [phone] - after line placement on 2/18/25  Confirmation will be scanned to chart

## 2025-02-17 NOTE — PROGRESS NOTES
Called patient about procedure. Told to be here at 0830 for procedure at 1000. Must be NPO after midnight but can take morning medication with sips of water. Patient stated they have been holding blood thinners prior to procedure as directed by the office. Told to have a responsible adult with them to take them home and stay with them afterwards, if they do not get admitted to hospital. Also, to bring a current list of medications. No other questions or concerns.

## 2025-02-18 ENCOUNTER — HOSPITAL ENCOUNTER (OUTPATIENT)
Dept: ONCOLOGY | Age: 60
Setting detail: INFUSION SERIES
Discharge: HOME | End: 2025-02-18
Payer: COMMERCIAL

## 2025-02-18 ENCOUNTER — HOSPITAL ENCOUNTER (OUTPATIENT)
Dept: INTERVENTIONAL RADIOLOGY/VASCULAR | Age: 60
Discharge: HOME | End: 2025-02-20
Attending: INTERNAL MEDICINE
Payer: COMMERCIAL

## 2025-02-18 VITALS — BODY MASS INDEX: 40.74 KG/M2 | HEIGHT: 67 IN

## 2025-02-18 VITALS
RESPIRATION RATE: 18 BRPM | DIASTOLIC BLOOD PRESSURE: 77 MMHG | SYSTOLIC BLOOD PRESSURE: 144 MMHG | TEMPERATURE: 98.7 F | HEART RATE: 72 BPM | OXYGEN SATURATION: 97 %

## 2025-02-18 DIAGNOSIS — L08.9: ICD-10-CM

## 2025-02-18 DIAGNOSIS — S80.851A: ICD-10-CM

## 2025-02-18 DIAGNOSIS — M79.661 PAIN AND SWELLING OF RIGHT LOWER LEG: Primary | ICD-10-CM

## 2025-02-18 DIAGNOSIS — M79.89 PAIN AND SWELLING OF RIGHT LOWER LEG: Primary | ICD-10-CM

## 2025-02-18 LAB
ALBUMIN SERPL-MCNC: 3.8 G/DL (ref 3.4–5)
ALBUMIN/GLOB SERPL: 1.3 {RATIO} (ref 1.1–2.2)
ALP SERPL-CCNC: 74 U/L (ref 40–129)
ALT SERPL-CCNC: 15 U/L (ref 10–40)
ANION GAP SERPL CALCULATED.3IONS-SCNC: 9 MMOL/L (ref 3–16)
AST SERPL-CCNC: 21 U/L (ref 15–37)
BASOPHILS # BLD: 0 K/UL (ref 0–0.2)
BASOPHILS NFR BLD: 0.1 %
BILIRUB SERPL-MCNC: 0.8 MG/DL (ref 0–1)
BUN SERPL-MCNC: 16 MG/DL (ref 7–20)
CALCIUM SERPL-MCNC: 8.6 MG/DL (ref 8.3–10.6)
CHLORIDE SERPL-SCNC: 103 MMOL/L (ref 99–110)
CO2 SERPL-SCNC: 27 MMOL/L (ref 21–32)
CREAT SERPL-MCNC: 0.9 MG/DL (ref 0.9–1.3)
CRP SERPL-MCNC: 52.8 MG/L (ref 0–5.1)
DEPRECATED RDW RBC AUTO: 16.2 % (ref 12.4–15.4)
EOSINOPHIL # BLD: 0.2 K/UL (ref 0–0.6)
EOSINOPHIL NFR BLD: 3.5 %
ERYTHROCYTE [SEDIMENTATION RATE] IN BLOOD BY WESTERGREN METHOD: 37 MM/HR (ref 0–20)
GFR SERPLBLD CREATININE-BSD FMLA CKD-EPI: >90 ML/MIN/{1.73_M2}
GLUCOSE SERPL-MCNC: 90 MG/DL (ref 70–99)
HCT VFR BLD AUTO: 34.9 % (ref 40.5–52.5)
HGB BLD-MCNC: 11.4 G/DL (ref 13.5–17.5)
LYMPHOCYTES # BLD: 1.4 K/UL (ref 1–5.1)
LYMPHOCYTES NFR BLD: 26.7 %
MCH RBC QN AUTO: 27.2 PG (ref 26–34)
MCHC RBC AUTO-ENTMCNC: 32.6 G/DL (ref 31–36)
MCV RBC AUTO: 83.6 FL (ref 80–100)
MONOCYTES # BLD: 0.5 K/UL (ref 0–1.3)
MONOCYTES NFR BLD: 8.5 %
NEUTROPHILS # BLD: 3.3 K/UL (ref 1.7–7.7)
NEUTROPHILS NFR BLD: 61.2 %
PLATELET # BLD AUTO: 229 K/UL (ref 135–450)
PMV BLD AUTO: 6.5 FL (ref 5–10.5)
POTASSIUM SERPL-SCNC: 4.1 MMOL/L (ref 3.5–5.1)
PROT SERPL-MCNC: 6.8 G/DL (ref 6.4–8.2)
RBC # BLD AUTO: 4.17 M/UL (ref 4.2–5.9)
SODIUM SERPL-SCNC: 139 MMOL/L (ref 136–145)
WBC # BLD AUTO: 5.4 K/UL (ref 4–11)

## 2025-02-18 PROCEDURE — 2580000003 HC RX 258: Performed by: INTERNAL MEDICINE

## 2025-02-18 PROCEDURE — 80053 COMPREHEN METABOLIC PANEL: CPT

## 2025-02-18 PROCEDURE — 86140 C-REACTIVE PROTEIN: CPT

## 2025-02-18 PROCEDURE — 6360000002 HC RX W HCPCS: Performed by: RADIOLOGY

## 2025-02-18 PROCEDURE — C1894 INTRO/SHEATH, NON-LASER: HCPCS

## 2025-02-18 PROCEDURE — 76937 US GUIDE VASCULAR ACCESS: CPT

## 2025-02-18 PROCEDURE — 36592 COLLECT BLOOD FROM PICC: CPT

## 2025-02-18 PROCEDURE — 85025 COMPLETE CBC W/AUTO DIFF WBC: CPT

## 2025-02-18 PROCEDURE — 7100000010 HC PHASE II RECOVERY - FIRST 15 MIN

## 2025-02-18 PROCEDURE — 6360000002 HC RX W HCPCS: Performed by: INTERNAL MEDICINE

## 2025-02-18 PROCEDURE — 85652 RBC SED RATE AUTOMATED: CPT

## 2025-02-18 PROCEDURE — 96365 THER/PROPH/DIAG IV INF INIT: CPT

## 2025-02-18 PROCEDURE — 7100000011 HC PHASE II RECOVERY - ADDTL 15 MIN

## 2025-02-18 RX ORDER — ACETAMINOPHEN 325 MG/1
650 TABLET ORAL
Status: CANCELLED | OUTPATIENT
Start: 2025-02-23

## 2025-02-18 RX ORDER — ALBUTEROL SULFATE 90 UG/1
4 INHALANT RESPIRATORY (INHALATION) PRN
Status: CANCELLED | OUTPATIENT
Start: 2025-02-23

## 2025-02-18 RX ORDER — SODIUM CHLORIDE 9 MG/ML
5-250 INJECTION, SOLUTION INTRAVENOUS PRN
Status: CANCELLED | OUTPATIENT
Start: 2025-02-23

## 2025-02-18 RX ORDER — HYDROCORTISONE SODIUM SUCCINATE 100 MG/2ML
100 INJECTION INTRAMUSCULAR; INTRAVENOUS
Status: CANCELLED | OUTPATIENT
Start: 2025-02-23

## 2025-02-18 RX ORDER — SODIUM CHLORIDE 0.9 % (FLUSH) 0.9 %
5-40 SYRINGE (ML) INJECTION PRN
Status: CANCELLED | OUTPATIENT
Start: 2025-02-23

## 2025-02-18 RX ORDER — DIPHENHYDRAMINE HYDROCHLORIDE 50 MG/ML
50 INJECTION INTRAMUSCULAR; INTRAVENOUS
Status: CANCELLED | OUTPATIENT
Start: 2025-02-23

## 2025-02-18 RX ORDER — ONDANSETRON 2 MG/ML
8 INJECTION INTRAMUSCULAR; INTRAVENOUS
Status: CANCELLED | OUTPATIENT
Start: 2025-02-23

## 2025-02-18 RX ORDER — SODIUM CHLORIDE 9 MG/ML
INJECTION, SOLUTION INTRAVENOUS CONTINUOUS
Status: CANCELLED | OUTPATIENT
Start: 2025-02-23

## 2025-02-18 RX ORDER — HEPARIN 100 UNIT/ML
500 SYRINGE INTRAVENOUS PRN
Status: CANCELLED | OUTPATIENT
Start: 2025-02-23

## 2025-02-18 RX ORDER — LIDOCAINE HYDROCHLORIDE 10 MG/ML
INJECTION, SOLUTION EPIDURAL; INFILTRATION; INTRACAUDAL; PERINEURAL PRN
Status: COMPLETED | OUTPATIENT
Start: 2025-02-18 | End: 2025-02-18

## 2025-02-18 RX ORDER — EPINEPHRINE 1 MG/ML
0.3 INJECTION, SOLUTION INTRAMUSCULAR; SUBCUTANEOUS PRN
Status: CANCELLED | OUTPATIENT
Start: 2025-02-23

## 2025-02-18 RX ADMIN — LIDOCAINE HYDROCHLORIDE 5 ML: 10 INJECTION, SOLUTION EPIDURAL; INFILTRATION; INTRACAUDAL; PERINEURAL at 11:03

## 2025-02-18 RX ADMIN — MEROPENEM 1000 MG: 1 INJECTION, POWDER, FOR SOLUTION INTRAVENOUS at 12:20

## 2025-02-18 ASSESSMENT — PAIN - FUNCTIONAL ASSESSMENT: PAIN_FUNCTIONAL_ASSESSMENT: PREVENTS OR INTERFERES SOME ACTIVE ACTIVITIES AND ADLS

## 2025-02-18 NOTE — DISCHARGE INSTRUCTIONS
Caring for Your Peripherally Inserted Central Catheter (PICC)    You are going home with a peripherally inserted catheter (PICC). This small, soft tube has been placed in a vein in your arm. It is often used when treatment requires medications or nutrition for weeks or months. At home, you need to take care of your PICC to keep it working. And since a PICC line has such a high infection risk, you must take extra care washing your hands and preventing the spread of germs. This sheet will help you remember what to do to care for your PICC at home.    Understanding Your Role  . A nurse or other healthcare provider will teach you and your caregivers how to care for the PICC. Before leaving the hospital, make sure that you understand what to do at home, how long you may need the PICC, and when to have a follow up visit.  . You will likely be told to flush the PICC with saline or heparin solution. You may also be told to change the catheter’s injection caps and change the dressing (Bandage). Or, a nurse may do this for you during a follow-up visit. Only do these things if you are told to, following the instructions you were given.    Protecting the PICC  If the PICC gets damaged, it won’t work right and could raise you chance of infection. Call your healthcare team right away if any damage occurs. To protect your PICC at home:  . Prevent infection. Use good hand hygiene by following the guidelines on this sheet. Don’t touch the catheter or the dressing unless you need to. Always clean your hands before and after you come in contact with any part of the PICC. Your caregivers, family members, any visitors should use good hand hygiene also.  . Keep the PICC dry. The catheter and dressing must stay dry. Don’t take baths, go swimming, use a hot tub, or do other activities that could get the PICC wet. When showering, cover the area with plastic wrap or another cover as recommended by your healthcare provider. Keep the area out

## 2025-02-18 NOTE — PROGRESS NOTES
Pt seen and assessed [facility] OPO today for Merrem infusion per orders from Dr. Agustin.  Pt tolerated infusion well and without incident.  Pt verbalizes understanding of discharge instructions, including instructions on care to new midline. Pt in no apparent distress at this time. Discharged ambulatory to home with self.

## 2025-02-20 ENCOUNTER — TELEPHONE (OUTPATIENT)
Dept: WOUND CARE | Age: 60
End: 2025-02-20

## 2025-02-20 ENCOUNTER — HOSPITAL ENCOUNTER (OUTPATIENT)
Dept: WOUND CARE | Age: 60
Discharge: HOME | End: 2025-02-20
Attending: SPECIALIST
Payer: COMMERCIAL

## 2025-02-20 VITALS
TEMPERATURE: 97.5 F | HEART RATE: 70 BPM | SYSTOLIC BLOOD PRESSURE: 137 MMHG | DIASTOLIC BLOOD PRESSURE: 83 MMHG | RESPIRATION RATE: 18 BRPM

## 2025-02-20 DIAGNOSIS — I89.0 LYMPHEDEMA: ICD-10-CM

## 2025-02-20 DIAGNOSIS — I89.0 CHRONIC ACQUIRED LYMPHEDEMA: Primary | ICD-10-CM

## 2025-02-20 DIAGNOSIS — Z91.199 NONCOMPLIANCE: ICD-10-CM

## 2025-02-20 DIAGNOSIS — I83.009 VENOUS ULCER (HCC): ICD-10-CM

## 2025-02-20 DIAGNOSIS — L97.909 VENOUS ULCER (HCC): ICD-10-CM

## 2025-02-20 DIAGNOSIS — I87.331 IDIOPATHIC CHRONIC VENOUS HYPERTENSION OF RIGHT LOWER EXTREMITY WITH ULCER AND INFLAMMATION (HCC): ICD-10-CM

## 2025-02-20 PROCEDURE — 11042 DBRDMT SUBQ TIS 1ST 20SQCM/<: CPT

## 2025-02-20 PROCEDURE — 11045 DBRDMT SUBQ TISS EACH ADDL: CPT

## 2025-02-20 PROCEDURE — 6370000000 HC RX 637 (ALT 250 FOR IP): Performed by: SPECIALIST

## 2025-02-20 PROCEDURE — 29581 APPL MULTLAYER CMPRN SYS LEG: CPT

## 2025-02-20 RX ORDER — BETAMETHASONE DIPROPIONATE 0.05 %
OINTMENT (GRAM) TOPICAL ONCE
OUTPATIENT
Start: 2025-02-20 | End: 2025-02-20

## 2025-02-20 RX ORDER — TRIAMCINOLONE ACETONIDE 1 MG/G
OINTMENT TOPICAL ONCE
OUTPATIENT
Start: 2025-02-20 | End: 2025-02-20

## 2025-02-20 RX ORDER — LIDOCAINE 50 MG/G
OINTMENT TOPICAL ONCE
OUTPATIENT
Start: 2025-02-20 | End: 2025-02-20

## 2025-02-20 RX ORDER — LIDOCAINE 40 MG/G
CREAM TOPICAL ONCE
OUTPATIENT
Start: 2025-02-20 | End: 2025-02-20

## 2025-02-20 RX ORDER — MUPIROCIN 20 MG/G
OINTMENT TOPICAL ONCE
OUTPATIENT
Start: 2025-02-20 | End: 2025-02-20

## 2025-02-20 RX ORDER — GINSENG 100 MG
CAPSULE ORAL ONCE
OUTPATIENT
Start: 2025-02-20 | End: 2025-02-20

## 2025-02-20 RX ORDER — LIDOCAINE HYDROCHLORIDE 40 MG/ML
SOLUTION TOPICAL ONCE
Status: COMPLETED | OUTPATIENT
Start: 2025-02-20 | End: 2025-02-20

## 2025-02-20 RX ORDER — BACITRACIN ZINC AND POLYMYXIN B SULFATE 500; 1000 [USP'U]/G; [USP'U]/G
OINTMENT TOPICAL ONCE
OUTPATIENT
Start: 2025-02-20 | End: 2025-02-20

## 2025-02-20 RX ORDER — LIDOCAINE HYDROCHLORIDE 20 MG/ML
JELLY TOPICAL ONCE
OUTPATIENT
Start: 2025-02-20 | End: 2025-02-20

## 2025-02-20 RX ORDER — SILVER SULFADIAZINE 10 MG/G
CREAM TOPICAL ONCE
OUTPATIENT
Start: 2025-02-20 | End: 2025-02-20

## 2025-02-20 RX ORDER — SODIUM CHLOR/HYPOCHLOROUS ACID 0.033 %
SOLUTION, IRRIGATION IRRIGATION ONCE
OUTPATIENT
Start: 2025-02-20 | End: 2025-02-20

## 2025-02-20 RX ORDER — CLOBETASOL PROPIONATE 0.5 MG/G
OINTMENT TOPICAL ONCE
OUTPATIENT
Start: 2025-02-20 | End: 2025-02-20

## 2025-02-20 RX ORDER — LIDOCAINE HYDROCHLORIDE 40 MG/ML
SOLUTION TOPICAL ONCE
OUTPATIENT
Start: 2025-02-20 | End: 2025-02-20

## 2025-02-20 RX ORDER — NEOMYCIN/BACITRACIN/POLYMYXINB 3.5-400-5K
OINTMENT (GRAM) TOPICAL ONCE
OUTPATIENT
Start: 2025-02-20 | End: 2025-02-20

## 2025-02-20 RX ORDER — GENTAMICIN SULFATE 1 MG/G
OINTMENT TOPICAL ONCE
OUTPATIENT
Start: 2025-02-20 | End: 2025-02-20

## 2025-02-20 RX ADMIN — LIDOCAINE HYDROCHLORIDE: 40 SOLUTION TOPICAL at 09:05

## 2025-02-20 ASSESSMENT — PAIN DESCRIPTION - LOCATION: LOCATION: LEG

## 2025-02-20 ASSESSMENT — PAIN DESCRIPTION - DESCRIPTORS: DESCRIPTORS: BURNING

## 2025-02-20 ASSESSMENT — PAIN DESCRIPTION - ORIENTATION: ORIENTATION: RIGHT

## 2025-02-20 ASSESSMENT — PAIN SCALES - GENERAL: PAINLEVEL_OUTOF10: 7

## 2025-02-20 NOTE — PATIENT INSTRUCTIONS
Wound Care Center Physician Orders and Discharge Instructions  [facility]   [address] [place]  Telephone: [phone] FAX [phone]  NAME:  Justin Baeza  YOB: 1965  MEDICAL RECORD NUMBER:  [id number]  DATE: 2/20/2025      Return Appointment:  Return Appointment: With Khang Giles MD  in 1 Week(s)  [x] Return Appointment for a Wound Assessment with the nurse on: next monday    Future Appointments   Date Time Provider Department Center   2/24/2025  9:30 AM [facility] MEDICATION MANAGEMENT [facility] MM [facility]               Orders Placed This Encounter   Procedures    Initiate Outpatient Wound Care Protocol       Home Care Company: none    Medically necessary services for evaluation and treatment: []Skilled Nursing (using clean technique) []PT (Eval & Treat) []OT (Eval & Treat) []Social Work []Dietician []Other:      Wound care instructions:  If you smoke we ask that you refrain from smoking. Smoking inhibits wounds from healing.  When taking antibiotics take the entire prescription as ordered. Do not stop taking until medication is all gone unless otherwise instructed.   Exercise as tolerated.   Keep weight off wounds and reposition every 2 hours if applicable.  Do not get wounds wet in the bath or shower unless otherwise instructed by your physician. If your wound is on your foot or leg, you may purchase a cast bag. Please ask at the pharmacy.  Wash hands with soap and water prior to and after every dressing change.    [x]Wash wounds with: No need to wash. Leave dressing in place.    [x]Kiah wound Topical Treatments: Do not apply lotions, creams, or ointments to the skin around the wound bed unless directed as followed:   Apply around the wound:  zinc paste        [x]Wound Location: right lower leg wounds  Apply Primary Dressing to wound: polymem AG  Secondary Dressing: Extra absorbant pad   Avoid contact of tape with skin if possible.  When to change

## 2025-02-20 NOTE — TELEPHONE ENCOUNTER
Called and spoke to Mouna with [facility] [phone]  and she stated patient did not meet deductible and estimated  farrow velcro strong stocking cost is $130 per stocking. She stated she has reached out to patient and no answer. Will discuss with patient on Monday at wound care visit.

## 2025-02-21 NOTE — PROGRESS NOTES
Multilayer Compression Wrap   (Not Unna) Below the Knee    NAME:  Justin Baeza  YOB: 1965  MEDICAL RECORD NUMBER:  [id number]  DATE:  2/20/2025       Removed old Multilayer wrap if present and washed leg with mild soap/water.   Applied moisturizing agent to dry skin as needed.    Applied primary and secondary dressing as ordered    Applied multilayered dressing below the knee to Right lower leg(s)  (4 Layer Compression Wrap ) .    Instructed patient/caregiver not to remove dressing and to keep it clean and dry.    Instructed patient/caregiver on complications to report to provider, such as pain, numbness in toes, heavy drainage, and slippage of dressing.    Instructed patient on purpose of compression dressing and on activity and exercise recommendations.   Applied per   Guidelines    Electronically signed by Edgar Nunez RN on 2/20/2025 at 9:46 AM      
business hours to return your call.  These hours of operation are subject to change. If you need help with your wounds and cannot wait until we are available, contact your PCP or go to your preferred emergency room.     Call your doctor now or seek immediate medical care if:    You have symptoms of infection, such as:  Increased pain, swelling, warmth, or redness.  Red streaks leading from the area.  Pus draining from the area.  A fever.       Electronically signed by CHICO VELIZ MD on 2/21/2025 at 9:06 AM

## 2025-02-24 ENCOUNTER — ANTI-COAG VISIT (OUTPATIENT)
Dept: PHARMACY | Age: 60
End: 2025-02-24
Payer: COMMERCIAL

## 2025-02-24 ENCOUNTER — HOSPITAL ENCOUNTER (OUTPATIENT)
Dept: WOUND CARE | Age: 60
Discharge: HOME | End: 2025-02-24
Attending: SPECIALIST

## 2025-02-24 ENCOUNTER — TELEPHONE (OUTPATIENT)
Dept: INFECTIOUS DISEASES | Age: 60
End: 2025-02-24

## 2025-02-24 DIAGNOSIS — I82.5Z1 CHRONIC VENOUS EMBOLISM AND THROMBOSIS OF DEEP VESSELS OF DISTAL END OF RIGHT LOWER EXTREMITY (HCC): Primary | ICD-10-CM

## 2025-02-24 LAB
INTERNATIONAL NORMALIZATION RATIO, POC: 1.5
PROTHROMBIN TIME, POC: 0

## 2025-02-24 PROCEDURE — 29581 APPL MULTLAYER CMPRN SYS LEG: CPT

## 2025-02-24 PROCEDURE — 99211 OFF/OP EST MAY X REQ PHY/QHP: CPT | Performed by: PHARMACIST

## 2025-02-24 PROCEDURE — 85610 PROTHROMBIN TIME: CPT | Performed by: PHARMACIST

## 2025-02-24 NOTE — PROGRESS NOTES
change  7.5 7.5 7.5 7.5 7.5 10 7.5 55  3/13 2.6 At goal, no change  7.5 7.5 7.5 7.5 7.5 10 7.5 55  2/27 2.7 At goal, no change  7.5 7.5 7.5 7.5 7.5 10 7.5 55  2/20 2.9 At goal, no change  7.5 7.5 7.5 7.5 7.5 10 7.5 55  2/13 2.1 At goal, no change  7.5 7.5 7.5 7.5 7.5 10 7.5 55    Last CBC:  Lab Results   Component Value Date    RBC 4.17 (L) 02/18/2025    HGB 11.4 (L) 02/18/2025    HCT 34.9 (L) 02/18/2025    MCV 83.6 02/18/2025    MCH 27.2 02/18/2025    MPV 6.5 02/18/2025    RDW 16.2 (H) 02/18/2025     02/18/2025     Patient History:  Recent hospitalizations/HC visits 2/17/25: PICC line placed, held warfarin x5 days prior   2/8/24: PICC line placed, held warfarin x2 days prior    Recent medication changes 2/17/25-current: meropenem   10/2024: amoxicillin   8/12-8/22/24: Levofloxacin 500 mg daily   2/8-2/27/24: Zosyn   2/1-2/7/24: Bactrim x 7 days   1/4-1/14/24: Cipro x 10 days  8/2-8/30/23: Zosyn    Medications taken regularly that may interact with warfarin or alter INR None reported   Warfarin dose taken as prescribed Yes - does not use pillbox (only takes warfarin)   Signs/symptoms of bleeding No h/o bleeding reported   Vitamin K intake Normally avoids high vitamin K foods: ~0-1 serving per week   Recent vomiting/diarrhea/fever, changes in weight or activity level None reported   Tobacco or alcohol use Patient denies drinking or smoking    Upcoming surgeries or procedures None reported     Assessment/Plan:  Patient's INR was subtherapeutic today. He has been back on warfarin for <1 week after PICC placement. He was started on meropenem last week, which may increase INR level.     Patient was instructed to continue warfarin 7.5 mg daily except 5 mg on Mondays and Fridays. Repeat INR in 1 week to assess possible DDI with warfarin+meropenem. Patient usually comes every ~8 weeks. Patient prefers to extend interval between appointments.     Yana Katz, PharmD, [facility]  [facility]  [place]:

## 2025-02-24 NOTE — TELEPHONE ENCOUNTER
OPAT Nurse Coordinator Weekly Update Note    Current OPAT plan:  Meropenem 1 gm iv q 8 hours through 3/3    Diagnosis:  R leg wound infection    Assessment:  spoke with pt.  He states his leg has more drainage and more pain, but not more swelling.  Pt states the drainage from his leg soaks through the dressing and when the dressing is changed, the \"drainage is yellow, pink, and brown on the gauze\".  Pt states [facility] and Dr Giles say \"wound looks good\"  Pt is tolerating IV ATB without adverse SE  Pt continues to work and is on his feet a lot.  I let pt know I would get all the infomatin to our team   Pt verbalized understanding.    Follow up appts:  Dr Giles 2/27 - will watch for note

## 2025-02-26 RX ORDER — AMOXICILLIN 500 MG/1
500 CAPSULE ORAL 3 TIMES DAILY
Qty: 15 CAPSULE | Refills: 0 | Status: SHIPPED | OUTPATIENT
Start: 2025-02-26 | End: 2025-03-03

## 2025-02-26 NOTE — TELEPHONE ENCOUNTER
Due to presence of EFaecalis, I think patient is supposed to be on PO amox as well.     Will write new rx as it looks like it has lapsed.

## 2025-02-27 ENCOUNTER — HOSPITAL ENCOUNTER (OUTPATIENT)
Dept: WOUND CARE | Age: 60
Discharge: HOME | End: 2025-02-27
Attending: SPECIALIST
Payer: COMMERCIAL

## 2025-02-27 VITALS
RESPIRATION RATE: 16 BRPM | DIASTOLIC BLOOD PRESSURE: 79 MMHG | SYSTOLIC BLOOD PRESSURE: 149 MMHG | HEART RATE: 82 BPM | TEMPERATURE: 97 F

## 2025-02-27 DIAGNOSIS — I87.331 IDIOPATHIC CHRONIC VENOUS HYPERTENSION OF RIGHT LOWER EXTREMITY WITH ULCER AND INFLAMMATION (HCC): ICD-10-CM

## 2025-02-27 DIAGNOSIS — Z91.199 NONCOMPLIANCE: ICD-10-CM

## 2025-02-27 DIAGNOSIS — I89.0 CHRONIC ACQUIRED LYMPHEDEMA: Primary | ICD-10-CM

## 2025-02-27 DIAGNOSIS — L97.909 VENOUS ULCER (HCC): ICD-10-CM

## 2025-02-27 DIAGNOSIS — I89.0 LYMPHEDEMA: ICD-10-CM

## 2025-02-27 DIAGNOSIS — I83.009 VENOUS ULCER (HCC): ICD-10-CM

## 2025-02-27 PROCEDURE — 6370000000 HC RX 637 (ALT 250 FOR IP): Performed by: SPECIALIST

## 2025-02-27 PROCEDURE — 11045 DBRDMT SUBQ TISS EACH ADDL: CPT

## 2025-02-27 PROCEDURE — 11042 DBRDMT SUBQ TIS 1ST 20SQCM/<: CPT

## 2025-02-27 PROCEDURE — 29581 APPL MULTLAYER CMPRN SYS LEG: CPT

## 2025-02-27 PROCEDURE — 11045 DBRDMT SUBQ TISS EACH ADDL: CPT | Performed by: SPECIALIST

## 2025-02-27 PROCEDURE — 11042 DBRDMT SUBQ TIS 1ST 20SQCM/<: CPT | Performed by: SPECIALIST

## 2025-02-27 RX ORDER — BETAMETHASONE DIPROPIONATE 0.05 %
OINTMENT (GRAM) TOPICAL ONCE
OUTPATIENT
Start: 2025-02-27 | End: 2025-02-27

## 2025-02-27 RX ORDER — TRIAMCINOLONE ACETONIDE 1 MG/G
OINTMENT TOPICAL ONCE
OUTPATIENT
Start: 2025-02-27 | End: 2025-02-27

## 2025-02-27 RX ORDER — NEOMYCIN/BACITRACIN/POLYMYXINB 3.5-400-5K
OINTMENT (GRAM) TOPICAL ONCE
OUTPATIENT
Start: 2025-02-27 | End: 2025-02-27

## 2025-02-27 RX ORDER — SODIUM CHLOR/HYPOCHLOROUS ACID 0.033 %
SOLUTION, IRRIGATION IRRIGATION ONCE
OUTPATIENT
Start: 2025-02-27 | End: 2025-02-27

## 2025-02-27 RX ORDER — GENTAMICIN SULFATE 1 MG/G
OINTMENT TOPICAL ONCE
OUTPATIENT
Start: 2025-02-27 | End: 2025-02-27

## 2025-02-27 RX ORDER — SILVER SULFADIAZINE 10 MG/G
CREAM TOPICAL ONCE
OUTPATIENT
Start: 2025-02-27 | End: 2025-02-27

## 2025-02-27 RX ORDER — LIDOCAINE 40 MG/G
CREAM TOPICAL ONCE
OUTPATIENT
Start: 2025-02-27 | End: 2025-02-27

## 2025-02-27 RX ORDER — GINSENG 100 MG
CAPSULE ORAL ONCE
OUTPATIENT
Start: 2025-02-27 | End: 2025-02-27

## 2025-02-27 RX ORDER — MUPIROCIN 20 MG/G
OINTMENT TOPICAL ONCE
OUTPATIENT
Start: 2025-02-27 | End: 2025-02-27

## 2025-02-27 RX ORDER — LIDOCAINE 50 MG/G
OINTMENT TOPICAL ONCE
OUTPATIENT
Start: 2025-02-27 | End: 2025-02-27

## 2025-02-27 RX ORDER — LIDOCAINE HYDROCHLORIDE 20 MG/ML
JELLY TOPICAL ONCE
OUTPATIENT
Start: 2025-02-27 | End: 2025-02-27

## 2025-02-27 RX ORDER — BACITRACIN ZINC AND POLYMYXIN B SULFATE 500; 1000 [USP'U]/G; [USP'U]/G
OINTMENT TOPICAL ONCE
OUTPATIENT
Start: 2025-02-27 | End: 2025-02-27

## 2025-02-27 RX ORDER — LIDOCAINE HYDROCHLORIDE 40 MG/ML
SOLUTION TOPICAL ONCE
OUTPATIENT
Start: 2025-02-27 | End: 2025-02-27

## 2025-02-27 RX ORDER — LIDOCAINE HYDROCHLORIDE 40 MG/ML
SOLUTION TOPICAL ONCE
Status: COMPLETED | OUTPATIENT
Start: 2025-02-27 | End: 2025-02-27

## 2025-02-27 RX ORDER — CLOBETASOL PROPIONATE 0.5 MG/G
OINTMENT TOPICAL ONCE
OUTPATIENT
Start: 2025-02-27 | End: 2025-02-27

## 2025-02-27 RX ADMIN — LIDOCAINE HYDROCHLORIDE: 40 SOLUTION TOPICAL at 08:26

## 2025-02-27 ASSESSMENT — PAIN DESCRIPTION - PAIN TYPE: TYPE: ACUTE PAIN;CHRONIC PAIN

## 2025-02-27 ASSESSMENT — PAIN DESCRIPTION - ORIENTATION: ORIENTATION: RIGHT

## 2025-02-27 ASSESSMENT — PAIN SCALES - GENERAL: PAINLEVEL_OUTOF10: 7

## 2025-02-27 ASSESSMENT — PAIN DESCRIPTION - DESCRIPTORS: DESCRIPTORS: BURNING

## 2025-02-27 ASSESSMENT — PAIN DESCRIPTION - FREQUENCY: FREQUENCY: CONTINUOUS

## 2025-02-27 ASSESSMENT — PAIN DESCRIPTION - LOCATION: LOCATION: LEG

## 2025-02-27 NOTE — TELEPHONE ENCOUNTER
Spoke with pt, he was in wound care clinic.  Pt stated he picked up Amox from pharmacy yesteday.  Nurse in wound care clinic stated Dr Giles is in communication with Dr Agustni regarding stop date and when to start oral antiboitic.  I explained Dr Agustin is out this week, but will very likely speak with Dr Giles, but I should know if anything changes Monday and can notify pt.  Nurse and pt verbalized understanding  Nurse states wound look ok

## 2025-02-27 NOTE — PROGRESS NOTES
[facility]  Progress Note and Procedure Note      Justin Baeza  MEDICAL RECORD NUMBER:  [id number]  AGE: 59 y.o.   GENDER: male  : 1965  EPISODE DATE:  2025    Subjective:     Chief Complaint   Patient presents with    Wound Check       RLE         HISTORY of PRESENT ILLNESS HPI     Justin Baeza is a 59 y.o. male who presents today for wound/ulcer evaluation.   History of Wound Context:  Patient continues follow-up for chronic venous insufficiency with ulceration and lymphedema right lower extremity. He had now been off his antibiotics for several weeks. Patient continues to be on his feet extended periods of time during the day. Little utilization of lymphedema pumps. Once again we have been using a PolyMem with silver primary dressing.  Patient describes less drainage and discomfort since initiation of IV antibiotics per Dr. Agustin for Pseudomonas infection  Wound/Ulcer Pain Timing/Severity: none  Quality of pain: N/A  Severity:  0 / 10   Modifying Factors: None  Associated Signs/Symptoms: edema and drainage    Ulcer Identification:  Ulcer Type: venous    Contributing Factors: edema, venous stasis, lymphedema, obesity, and anticoagulation therapy    Acute Wound: N/A not an acute wound    PAST MEDICAL HISTORY        Diagnosis Date    Difficult intravenous access     not bedside candidate for PICC lines both arms no thread, needs IR or tunneled    DVT (deep venous thrombosis) (HCC)     Hx of blood clots     Pain and swelling of right lower leg        PAST SURGICAL HISTORY    Past Surgical History:   Procedure Laterality Date    BALLOON ANGIOPLASTY, ARTERY Right 2017    at [facility]    DILATATION, ESOPHAGUS      SKIN SPLIT GRAFT Right        FAMILY HISTORY    Family History   Problem Relation Age of Onset    Diabetes Mother     Heart Attack Father        SOCIAL HISTORY    Social History     Tobacco Use    Smoking status: Never    Smokeless tobacco: Never   Vaping Use

## 2025-02-27 NOTE — PROGRESS NOTES
Multilayer Compression Wrap   (Not Unna) Below the Knee    NAME:  Justin Baeza  YOB: 1965  MEDICAL RECORD NUMBER:  [id number]  DATE:  2/27/2025       Removed old Multilayer wrap if present and washed leg with mild soap/water.   Applied moisturizing agent to dry skin as needed.    Applied primary and secondary dressing as ordered    Applied multilayered dressing below the knee to Right lower leg(s)  (4 Layer Compression Wrap ) .    Instructed patient/caregiver not to remove dressing and to keep it clean and dry.    Instructed patient/caregiver on complications to report to provider, such as pain, numbness in toes, heavy drainage, and slippage of dressing.    Instructed patient on purpose of compression dressing and on activity and exercise recommendations.   Applied per   Guidelines    Electronically signed by Yesenia Hawkins RN on 2/27/25 at 8:49 AM EST

## 2025-02-27 NOTE — PATIENT INSTRUCTIONS
Wound Care Center Physician Orders and Discharge Instructions  [facility]   [address]  Telephone: [phone] FAX [phone]  NAME:  Justin Baeza  YOB: 1965  MEDICAL RECORD NUMBER:  [id number]  DATE: 2/27/2025      Return Appointment:  Return Appointment: With Khang Giles MD  in 1 Week(s)  [x] Return Appointment for a Wound Assessment with the nurse on: next monday    Future Appointments   Date Time Provider Department Center   3/3/2025  9:30 AM [facility] MEDICATION MANAGEMENT [facility] MM [facility]               Orders Placed This Encounter   Procedures    Initiate Outpatient Wound Care Protocol       Home Care Company: none    Medically necessary services for evaluation and treatment: []Skilled Nursing (using clean technique) []PT (Eval & Treat) []OT (Eval & Treat) []Social Work []Dietician []Other:      Wound care instructions:  If you smoke we ask that you refrain from smoking. Smoking inhibits wounds from healing.  When taking antibiotics take the entire prescription as ordered. Do not stop taking until medication is all gone unless otherwise instructed.   Exercise as tolerated.   Keep weight off wounds and reposition every 2 hours if applicable.  Do not get wounds wet in the bath or shower unless otherwise instructed by your physician. If your wound is on your foot or leg, you may purchase a cast bag. Please ask at the pharmacy.  Wash hands with soap and water prior to and after every dressing change.    [x]Wash wounds with: No need to wash. Leave dressing in place.    [x]Kiah wound Topical Treatments: Do not apply lotions, creams, or ointments to the skin around the wound bed unless directed as followed:   Apply around the wound:  zinc paste        [x]Wound Location: right lower leg wounds  Apply Primary Dressing to wound: polymem AG  Secondary Dressing: Extra absorbant pad   Avoid contact of tape with skin if possible.  When to change

## 2025-03-03 ENCOUNTER — TELEPHONE (OUTPATIENT)
Dept: INFECTIOUS DISEASES | Age: 60
End: 2025-03-03

## 2025-03-03 ENCOUNTER — ANTI-COAG VISIT (OUTPATIENT)
Dept: PHARMACY | Age: 60
End: 2025-03-03
Payer: COMMERCIAL

## 2025-03-03 ENCOUNTER — HOSPITAL ENCOUNTER (OUTPATIENT)
Dept: WOUND CARE | Age: 60
Discharge: HOME | End: 2025-03-03
Attending: SPECIALIST
Payer: COMMERCIAL

## 2025-03-03 VITALS
SYSTOLIC BLOOD PRESSURE: 130 MMHG | HEART RATE: 80 BPM | TEMPERATURE: 96 F | DIASTOLIC BLOOD PRESSURE: 78 MMHG | RESPIRATION RATE: 16 BRPM

## 2025-03-03 LAB
INTERNATIONAL NORMALIZATION RATIO, POC: 1.8
PROTHROMBIN TIME, POC: 0

## 2025-03-03 PROCEDURE — 29581 APPL MULTLAYER CMPRN SYS LEG: CPT

## 2025-03-03 PROCEDURE — 85610 PROTHROMBIN TIME: CPT

## 2025-03-03 PROCEDURE — 99211 OFF/OP EST MAY X REQ PHY/QHP: CPT

## 2025-03-03 ASSESSMENT — PAIN DESCRIPTION - PAIN TYPE: TYPE: CHRONIC PAIN

## 2025-03-03 ASSESSMENT — PAIN DESCRIPTION - ONSET: ONSET: ON-GOING

## 2025-03-03 ASSESSMENT — PAIN DESCRIPTION - DESCRIPTORS: DESCRIPTORS: BURNING

## 2025-03-03 ASSESSMENT — PAIN SCALES - GENERAL: PAINLEVEL_OUTOF10: 5

## 2025-03-03 ASSESSMENT — PAIN DESCRIPTION - ORIENTATION: ORIENTATION: RIGHT

## 2025-03-03 ASSESSMENT — PAIN DESCRIPTION - LOCATION: LOCATION: LEG

## 2025-03-03 ASSESSMENT — PAIN DESCRIPTION - FREQUENCY: FREQUENCY: CONTINUOUS

## 2025-03-03 NOTE — PROGRESS NOTES
continue    7.5 5 7.5 7.5 7.5 5 7.5 47.5  7/6 3.1 Above goal, decrease  7.5 5 7.5 7.5 7.5 5 7.5 47.5  6/22 2.4 At goal, resume prv dose 7.5 7.5 7.5 7.5 7.5 5 7.5 50  6/1 2.6 At goal, continue   7.5 5 7.5 5 7.5 5 7.5 45  5/18 4.1 Above goal, dec (abx)  7.5 5 7.5 5 7.5 5 7.5 45  4/6 2.8 At goal, continue  7.5 7.5 7.5 7.5 7.5 5 7.5 50  12/1 2.9 At goal, continue  7.5 7.5 7.5 7.5 7.5 5 7.5 50  11/10 2.4 At goal, continue   7.5 7.5 7.5 7.5 7.5 5 7.5 50  10/27 4.4 Above goal, hold+dec  7.5 7.5 7.5 7.5 0/7.5 5 7.5 50  8/25 2.9 At goal, continue  7.5 7.5 7.5 7.5 7.5 7.5 7.5 52.5  7/14 2.5 At goal, continue   7.5 7.5 7.5 7.5 7.5 7.5 7.5 52.5  6/14 2.9 At goal, continue  7.5 7.5 7.5 7.5 0/7.5 7.5 7.5 52.5  5/26 3.9 Above goal (ABX), hold x 1 7.5 7.5 7.5 7.5 0/7.5 7.5 7.5 52.5  3/31 2.5 At goal, no change  7.5 7.5 7.5 7.5 7.5 7.5 7.5 52.5  2/17 3.0 At goal, no change  7.5 7.5 7.5 7.5 7.5 7.5 7.5 52.5  1/6 2.8 At goal, no change  7.5 7.5 7.5 7.5 7.5 7.5 7.5 52.5  12/2 2.9 At goal, no change  7.5 7.5 7.5 7.5 7.5 7.5 7.5 52.5  11/4 3.1 Above goal, continue  7.5 7.5 7.5 7.5 7.5 7.5 7.5 52.5  10/7 2.7 At goal, no change  7.5 7.5 7.5 7.5 7.5 7.5 7.5 52.5  9/9 2.7 At goal, no change  7.5 7.5 7.5 7.5 7.5 7.5 7.5 52.5  8/17 2.8 At goal, no change  7.5 7.5 7.5 7.5 7.5 7.5 7.5 52.5  8/3 3.4 Above goal, decrease  7.5 7.5 7.5 7.5 7.5 7.5 7.5 52.5  6/8 2.9 At goal, no change  7.5 7.5 7.5 7.5 7.5 10 7.5 55  2/28 2.7 At goal, no change  7.5 7.5 7.5 7.5 7.5 10 7.5 55  1/3 2.6 At goal, no change  7.5 7.5 7.5 7.5 7.5 10 7.5 55  11/20 2.8 At goal, no change  7.5 7.5 7.5 7.5 7.5 10 7.5 55  10/23 2.6 At goal, no change  7.5 7.5 7.5 7.5 7.5 10 7.5 55  8/23 2.2 At goal, no change  7.5 7.5 7.5 7.5 7.5 10 7.5 55  7/26 2.5 At goal, no change  7.5 7.5 7.5 7.5 7.5 10 7.5 55  6/28 2.3 At goal, no change  7.5 7.5 7.5 7.5 7.5 10 7.5 55  5/31 2.6 At goal, no change  7.5 7.5 7.5 7.5 7.5 10 7.5 55  5/1 2.5 At goal, no

## 2025-03-03 NOTE — TELEPHONE ENCOUNTER
Called pt -   R leg is better yet still with some pain, drainage  Taking iv Meropenem and po Amoxicillin 500 tid    Improved per Dr Giles [facility]    Discussed with pt   He is agreeable to extending therapy.    Will continue iv Meropenem 1 gm q 8, new end date 3/12/25   Will change to  Augmentin 875 bid and order for 2 more weeks.

## 2025-03-03 NOTE — PATIENT INSTRUCTIONS
Wound Care Center Physician Orders and Discharge Instructions  [facility]  [address] [facility]. [address] [place] [address]  Telephone: [phone]      FAX [phone]    NAME:  Justin Baeza  YOB: 1965  MEDICAL RECORD NUMBER:  [id number]  DATE:  3/3/2025      Wound care:  Continue to follow the instructions and recommendations from your last doctor visit.  The dressing(s) applied is the same as your last visit. Please refer to your last discharge instruction for the information on your wound care.      If there were any changes made, please follow the instructions as written here: none    Future Appointments     Future Appointments   Date Time Provider Department Center   3/3/2025  9:30 AM [facility] MEDICATION MANAGEMENT [facility]   3/6/2025  8:00 AM Khang Giles MD [facility] WOUND [facility]           Your nurse  is:  Yesenia     Electronically signed by Kathy Gomez RN on 3/3/2025 at 8:42 AM     Wound Care Center Information: Should you experience any significant changes in your wound(s) or have questions about your wound care, please contact the [facility] at [phone]. Our hours vary so please leave a message. Please give us 24-48 hours to return your call.   If you need help with your wounds and cannot wait until we are available, contact your PCP or go to the hospital emergency room.       Physician orders by:  Dr. Giles        The information contained in the After Visit Summary has been reviewed with me, the patient and/or responsible adult, by my health care provider(s).  I had the opportunity to ask questions regarding this information.  I have elected to receive;      [] Patient unable to sign Discharge Instructions. Given to ECF/Transportation/POA

## 2025-03-03 NOTE — PROGRESS NOTES
Multilayer Compression Wrap   (Not Unna) Below the Knee    NAME:  Justin Baeza  YOB: 1965  MEDICAL RECORD NUMBER:  [id number]  DATE:  3/3/2025       Removed old Multilayer wrap if present and washed leg with mild soap/water.   Applied moisturizing agent to dry skin as needed.    Applied primary and secondary dressing as ordered    Applied multilayered dressing below the knee to Right lower leg(s)  (4 Layer Compression Wrap ) .    Instructed patient/caregiver not to remove dressing and to keep it clean and dry.    Instructed patient/caregiver on complications to report to provider, such as pain, numbness in toes, heavy drainage, and slippage of dressing.    Instructed patient on purpose of compression dressing and on activity and exercise recommendations.   Applied per   Guidelines    Electronically signed by Kathy Gomez RN on 3/3/25 at 8:42 AM EST

## 2025-03-03 NOTE — TELEPHONE ENCOUNTER
Spoke with Margareth, clinical pharmacist with [employer], and gave VO to extend IV ATB through 3/12/25  Margareth HUGHES correctly    Spoke with Rodney, nurse with [facility], and updated her to extension and need for labs this week and next.  Rodney RBVO correctly

## 2025-03-04 ENCOUNTER — HOSPITAL ENCOUNTER (OUTPATIENT)
Age: 60
Setting detail: SPECIMEN
Discharge: HOME | End: 2025-03-04
Payer: COMMERCIAL

## 2025-03-04 LAB
ALBUMIN SERPL-MCNC: 3.9 G/DL (ref 3.4–5)
ALBUMIN/GLOB SERPL: 1.3 {RATIO} (ref 1.1–2.2)
ALP SERPL-CCNC: 89 U/L (ref 40–129)
ALT SERPL-CCNC: 22 U/L (ref 10–40)
ANION GAP SERPL CALCULATED.3IONS-SCNC: 8 MMOL/L (ref 3–16)
AST SERPL-CCNC: 26 U/L (ref 15–37)
BASOPHILS # BLD: 0 K/UL (ref 0–0.2)
BASOPHILS NFR BLD: 0.1 %
BILIRUB SERPL-MCNC: 0.5 MG/DL (ref 0–1)
BUN SERPL-MCNC: 19 MG/DL (ref 7–20)
CALCIUM SERPL-MCNC: 8.8 MG/DL (ref 8.3–10.6)
CHLORIDE SERPL-SCNC: 107 MMOL/L (ref 99–110)
CO2 SERPL-SCNC: 28 MMOL/L (ref 21–32)
CREAT SERPL-MCNC: 0.8 MG/DL (ref 0.9–1.3)
CRP SERPL-MCNC: 35.5 MG/L (ref 0–5.1)
DEPRECATED RDW RBC AUTO: 16.3 % (ref 12.4–15.4)
EOSINOPHIL # BLD: 0.2 K/UL (ref 0–0.6)
EOSINOPHIL NFR BLD: 5.5 %
ERYTHROCYTE [SEDIMENTATION RATE] IN BLOOD BY WESTERGREN METHOD: 42 MM/HR (ref 0–20)
GFR SERPLBLD CREATININE-BSD FMLA CKD-EPI: >90 ML/MIN/{1.73_M2}
GLUCOSE SERPL-MCNC: 88 MG/DL (ref 70–99)
HCT VFR BLD AUTO: 35.1 % (ref 40.5–52.5)
HGB BLD-MCNC: 11.4 G/DL (ref 13.5–17.5)
LYMPHOCYTES # BLD: 1.3 K/UL (ref 1–5.1)
LYMPHOCYTES NFR BLD: 31.6 %
MCH RBC QN AUTO: 26.8 PG (ref 26–34)
MCHC RBC AUTO-ENTMCNC: 32.5 G/DL (ref 31–36)
MCV RBC AUTO: 82.5 FL (ref 80–100)
MONOCYTES # BLD: 0.5 K/UL (ref 0–1.3)
MONOCYTES NFR BLD: 11.5 %
NEUTROPHILS # BLD: 2.1 K/UL (ref 1.7–7.7)
NEUTROPHILS NFR BLD: 51.3 %
PLATELET # BLD AUTO: 258 K/UL (ref 135–450)
PMV BLD AUTO: 6.4 FL (ref 5–10.5)
POTASSIUM SERPL-SCNC: 5.1 MMOL/L (ref 3.5–5.1)
PROT SERPL-MCNC: 6.8 G/DL (ref 6.4–8.2)
RBC # BLD AUTO: 4.25 M/UL (ref 4.2–5.9)
SODIUM SERPL-SCNC: 143 MMOL/L (ref 136–145)
WBC # BLD AUTO: 4.1 K/UL (ref 4–11)

## 2025-03-04 PROCEDURE — 85025 COMPLETE CBC W/AUTO DIFF WBC: CPT

## 2025-03-04 PROCEDURE — 36415 COLL VENOUS BLD VENIPUNCTURE: CPT

## 2025-03-04 PROCEDURE — 85652 RBC SED RATE AUTOMATED: CPT

## 2025-03-04 PROCEDURE — 86140 C-REACTIVE PROTEIN: CPT

## 2025-03-04 PROCEDURE — 80053 COMPREHEN METABOLIC PANEL: CPT

## 2025-03-06 ENCOUNTER — HOSPITAL ENCOUNTER (OUTPATIENT)
Dept: WOUND CARE | Age: 60
Discharge: HOME | End: 2025-03-06
Attending: SPECIALIST
Payer: COMMERCIAL

## 2025-03-06 VITALS
SYSTOLIC BLOOD PRESSURE: 134 MMHG | RESPIRATION RATE: 18 BRPM | TEMPERATURE: 97.3 F | DIASTOLIC BLOOD PRESSURE: 79 MMHG | HEART RATE: 76 BPM

## 2025-03-06 DIAGNOSIS — I89.0 LYMPHEDEMA: ICD-10-CM

## 2025-03-06 DIAGNOSIS — Z91.199 NONCOMPLIANCE: ICD-10-CM

## 2025-03-06 DIAGNOSIS — I87.331 IDIOPATHIC CHRONIC VENOUS HYPERTENSION OF RIGHT LOWER EXTREMITY WITH ULCER AND INFLAMMATION (HCC): ICD-10-CM

## 2025-03-06 DIAGNOSIS — I83.009 VENOUS ULCER (HCC): ICD-10-CM

## 2025-03-06 DIAGNOSIS — I89.0 CHRONIC ACQUIRED LYMPHEDEMA: Primary | ICD-10-CM

## 2025-03-06 DIAGNOSIS — L97.909 VENOUS ULCER (HCC): ICD-10-CM

## 2025-03-06 PROCEDURE — 11045 DBRDMT SUBQ TISS EACH ADDL: CPT

## 2025-03-06 PROCEDURE — 6370000000 HC RX 637 (ALT 250 FOR IP): Performed by: SPECIALIST

## 2025-03-06 PROCEDURE — 29581 APPL MULTLAYER CMPRN SYS LEG: CPT

## 2025-03-06 PROCEDURE — 11042 DBRDMT SUBQ TIS 1ST 20SQCM/<: CPT

## 2025-03-06 RX ORDER — BACITRACIN ZINC AND POLYMYXIN B SULFATE 500; 1000 [USP'U]/G; [USP'U]/G
OINTMENT TOPICAL ONCE
OUTPATIENT
Start: 2025-03-06 | End: 2025-03-06

## 2025-03-06 RX ORDER — NEOMYCIN/BACITRACIN/POLYMYXINB 3.5-400-5K
OINTMENT (GRAM) TOPICAL ONCE
OUTPATIENT
Start: 2025-03-06 | End: 2025-03-06

## 2025-03-06 RX ORDER — LIDOCAINE 50 MG/G
OINTMENT TOPICAL ONCE
OUTPATIENT
Start: 2025-03-06 | End: 2025-03-06

## 2025-03-06 RX ORDER — SILVER SULFADIAZINE 10 MG/G
CREAM TOPICAL ONCE
OUTPATIENT
Start: 2025-03-06 | End: 2025-03-06

## 2025-03-06 RX ORDER — LIDOCAINE HYDROCHLORIDE 20 MG/ML
JELLY TOPICAL ONCE
OUTPATIENT
Start: 2025-03-06 | End: 2025-03-06

## 2025-03-06 RX ORDER — GENTAMICIN SULFATE 1 MG/G
OINTMENT TOPICAL ONCE
OUTPATIENT
Start: 2025-03-06 | End: 2025-03-06

## 2025-03-06 RX ORDER — TRIAMCINOLONE ACETONIDE 1 MG/G
OINTMENT TOPICAL ONCE
OUTPATIENT
Start: 2025-03-06 | End: 2025-03-06

## 2025-03-06 RX ORDER — SODIUM CHLOR/HYPOCHLOROUS ACID 0.033 %
SOLUTION, IRRIGATION IRRIGATION ONCE
OUTPATIENT
Start: 2025-03-06 | End: 2025-03-06

## 2025-03-06 RX ORDER — LIDOCAINE HYDROCHLORIDE 40 MG/ML
SOLUTION TOPICAL ONCE
OUTPATIENT
Start: 2025-03-06 | End: 2025-03-06

## 2025-03-06 RX ORDER — BETAMETHASONE DIPROPIONATE 0.05 %
OINTMENT (GRAM) TOPICAL ONCE
OUTPATIENT
Start: 2025-03-06 | End: 2025-03-06

## 2025-03-06 RX ORDER — LIDOCAINE HYDROCHLORIDE 40 MG/ML
SOLUTION TOPICAL ONCE
Status: COMPLETED | OUTPATIENT
Start: 2025-03-06 | End: 2025-03-06

## 2025-03-06 RX ORDER — GINSENG 100 MG
CAPSULE ORAL ONCE
OUTPATIENT
Start: 2025-03-06 | End: 2025-03-06

## 2025-03-06 RX ORDER — MUPIROCIN 20 MG/G
OINTMENT TOPICAL ONCE
OUTPATIENT
Start: 2025-03-06 | End: 2025-03-06

## 2025-03-06 RX ORDER — CLOBETASOL PROPIONATE 0.5 MG/G
OINTMENT TOPICAL ONCE
OUTPATIENT
Start: 2025-03-06 | End: 2025-03-06

## 2025-03-06 RX ORDER — LIDOCAINE 40 MG/G
CREAM TOPICAL ONCE
OUTPATIENT
Start: 2025-03-06 | End: 2025-03-06

## 2025-03-06 RX ADMIN — LIDOCAINE HYDROCHLORIDE: 40 SOLUTION TOPICAL at 08:50

## 2025-03-06 ASSESSMENT — PAIN DESCRIPTION - PAIN TYPE: TYPE: CHRONIC PAIN;ACUTE PAIN

## 2025-03-06 ASSESSMENT — PAIN SCALES - GENERAL: PAINLEVEL_OUTOF10: 5

## 2025-03-06 ASSESSMENT — PAIN DESCRIPTION - ORIENTATION: ORIENTATION: RIGHT

## 2025-03-06 ASSESSMENT — PAIN DESCRIPTION - LOCATION: LOCATION: LEG

## 2025-03-06 ASSESSMENT — PAIN DESCRIPTION - DESCRIPTORS: DESCRIPTORS: BURNING

## 2025-03-06 NOTE — PATIENT INSTRUCTIONS
Wound Care Center Physician Orders and Discharge Instructions  [facility]   [address]  Telephone: [phone] FAX [phone]  NAME:  Justin Baeza  YOB: 1965  MEDICAL RECORD NUMBER:  [id number]  DATE: 3/6/2025      Return Appointment:  Return Appointment: With Khang Gilse MD  in 1 Week(s)  [x] Return Appointment for a Wound Assessment with the nurse on:  Mondays for dressing change    Future Appointments   Date Time Provider Department Center   3/17/2025  9:00 AM [facility] MEDICATION MANAGEMENT [facility] MM [facility]               Orders Placed This Encounter   Procedures    Initiate Outpatient Wound Care Protocol       Home Care Company: none    Medically necessary services for evaluation and treatment: []Skilled Nursing (using clean technique) []PT (Eval & Treat) []OT (Eval & Treat) []Social Work []Dietician []Other:      Wound care instructions:  If you smoke we ask that you refrain from smoking. Smoking inhibits wounds from healing.  When taking antibiotics take the entire prescription as ordered. Do not stop taking until medication is all gone unless otherwise instructed.   Exercise as tolerated.   Keep weight off wounds and reposition every 2 hours if applicable.  Do not get wounds wet in the bath or shower unless otherwise instructed by your physician. If your wound is on your foot or leg, you may purchase a cast bag. Please ask at the pharmacy.  Wash hands with soap and water prior to and after every dressing change.    [x]Wash wounds with: No need to wash. Leave dressing in place.    [x]Kiah wound Topical Treatments: Do not apply lotions, creams, or ointments to the skin around the wound bed unless directed as followed:   Apply around the wound:   triad cream - applied by dr. Giles         [x]Wound Location: right lower leg wounds  Apply Primary Dressing to wound: polymem AG  Secondary Dressing: Extra absorbant pad   Avoid contact of

## 2025-03-06 NOTE — PROGRESS NOTES
Multilayer Compression Wrap   (Not Unna) Below the Knee    NAME:  Justin Baeza  YOB: 1965  MEDICAL RECORD NUMBER:  [id number]  DATE:  3/6/2025       Removed old Multilayer wrap if present and washed leg with mild soap/water.   Applied moisturizing agent to dry skin as needed.    Applied primary and secondary dressing as ordered    Applied multilayered dressing below the knee to Right lower leg(s)  (4 Layer Compression Wrap ) .    Instructed patient/caregiver not to remove dressing and to keep it clean and dry.    Instructed patient/caregiver on complications to report to provider, such as pain, numbness in toes, heavy drainage, and slippage of dressing.    Instructed patient on purpose of compression dressing and on activity and exercise recommendations.   Applied per   Guidelines    Electronically signed by Kathy Gomez RN on 3/6/25 at 9:37 AM EST        
  Dressing Status New dressing applied 02/08/24 0843   Wound Cleansed Vashe 03/06/25 0908   Dressing/Treatment Foam impregnated with Ag 03/06/25 0936   Dressing Change Due 01/02/24 06/17/24 0754   Wound Length (cm) 2 cm 03/06/25 0841   Wound Width (cm) 1.4 cm 03/06/25 0841   Wound Depth (cm) 0.1 cm 03/06/25 0841   Wound Surface Area (cm^2) 2.8 cm^2 03/06/25 0841   Change in Wound Size % (l*w) 90.67 03/06/25 0841   Wound Volume (cm^3) 0.28 cm^3 03/06/25 0841   Wound Healing % 91 03/06/25 0841   Post-Procedure Length (cm) 2.1 cm 03/06/25 0908   Post-Procedure Width (cm) 1.5 cm 03/06/25 0908   Post-Procedure Depth (cm) 0.3 cm 03/06/25 0908   Post-Procedure Surface Area (cm^2) 3.15 cm^2 03/06/25 0908   Post-Procedure Volume (cm^3) 0.945 cm^3 03/06/25 0908   Distance Tunneling (cm) 0 cm 03/06/25 0841   Tunneling Position ___ O'Clock 0 04/18/24 0828   Undermining Starts ___ O'Clock 0 04/18/24 0828   Undermining Ends___ O'Clock 0 04/18/24 0828   Undermining Maxium Distance (cm) 0 03/06/25 0841   Wound Assessment Pink/red;Fibrin 03/06/25 0841   Drainage Amount Large (50-75% saturated) 03/06/25 0841   Drainage Description Yellow;Serosanguinous;Green 03/06/25 0841   Odor Mild 03/06/25 0841   Kiah-wound Assessment Maceration;Excoriated 03/06/25 0841   Margins Defined edges 03/06/25 0841   Wound Thickness Description not for Pressure Injury Full thickness 03/06/25 0841   Number of days: 427          Percent of Wound Debrided: 100%    Total Surface Area Debrided:  45 sq cm    Diabetic/Pressure/Non Pressure Ulcers only:  Ulcer: Non-Pressure ulcer, fat layer exposed    Bleeding: Minimal    Hemostasis Achieved: by pressure    Procedural Pain: 0  / 10     Post Procedural Pain: 0 / 10     Response to treatment:  Well tolerated by patient.         Plan:   Will continue with PolyMem with silver overlying all ulcerations.  Will continue with wrapping and 4-layer compression.  Await input from Dr. Page from vascular

## 2025-03-10 ENCOUNTER — TELEPHONE (OUTPATIENT)
Dept: VASCULAR SURGERY | Age: 60
End: 2025-03-10

## 2025-03-10 ENCOUNTER — HOSPITAL ENCOUNTER (OUTPATIENT)
Dept: WOUND CARE | Age: 60
Discharge: HOME | End: 2025-03-10
Attending: SPECIALIST
Payer: COMMERCIAL

## 2025-03-10 ENCOUNTER — TELEPHONE (OUTPATIENT)
Dept: INFECTIOUS DISEASES | Age: 60
End: 2025-03-10

## 2025-03-10 ENCOUNTER — TELEPHONE (OUTPATIENT)
Dept: PHARMACY | Age: 60
End: 2025-03-10

## 2025-03-10 VITALS
SYSTOLIC BLOOD PRESSURE: 126 MMHG | TEMPERATURE: 97.5 F | RESPIRATION RATE: 18 BRPM | DIASTOLIC BLOOD PRESSURE: 78 MMHG | HEART RATE: 78 BPM

## 2025-03-10 DIAGNOSIS — L97.909 VENOUS ULCER (HCC): ICD-10-CM

## 2025-03-10 DIAGNOSIS — I89.0 CHRONIC ACQUIRED LYMPHEDEMA: Primary | ICD-10-CM

## 2025-03-10 DIAGNOSIS — Z91.199 NONCOMPLIANCE: ICD-10-CM

## 2025-03-10 DIAGNOSIS — I83.009 VENOUS ULCER (HCC): ICD-10-CM

## 2025-03-10 DIAGNOSIS — I89.0 LYMPHEDEMA: ICD-10-CM

## 2025-03-10 DIAGNOSIS — I87.331 IDIOPATHIC CHRONIC VENOUS HYPERTENSION OF RIGHT LOWER EXTREMITY WITH ULCER AND INFLAMMATION (HCC): ICD-10-CM

## 2025-03-10 PROCEDURE — 29581 APPL MULTLAYER CMPRN SYS LEG: CPT

## 2025-03-10 RX ORDER — MUPIROCIN 20 MG/G
OINTMENT TOPICAL ONCE
OUTPATIENT
Start: 2025-03-10 | End: 2025-03-10

## 2025-03-10 RX ORDER — SILVER SULFADIAZINE 10 MG/G
CREAM TOPICAL ONCE
OUTPATIENT
Start: 2025-03-10 | End: 2025-03-10

## 2025-03-10 RX ORDER — NEOMYCIN/BACITRACIN/POLYMYXINB 3.5-400-5K
OINTMENT (GRAM) TOPICAL ONCE
OUTPATIENT
Start: 2025-03-10 | End: 2025-03-10

## 2025-03-10 RX ORDER — CLOBETASOL PROPIONATE 0.5 MG/G
OINTMENT TOPICAL ONCE
OUTPATIENT
Start: 2025-03-10 | End: 2025-03-10

## 2025-03-10 RX ORDER — BETAMETHASONE DIPROPIONATE 0.05 %
OINTMENT (GRAM) TOPICAL ONCE
OUTPATIENT
Start: 2025-03-10 | End: 2025-03-10

## 2025-03-10 RX ORDER — LIDOCAINE 50 MG/G
OINTMENT TOPICAL ONCE
OUTPATIENT
Start: 2025-03-10 | End: 2025-03-10

## 2025-03-10 RX ORDER — BACITRACIN ZINC AND POLYMYXIN B SULFATE 500; 1000 [USP'U]/G; [USP'U]/G
OINTMENT TOPICAL ONCE
OUTPATIENT
Start: 2025-03-10 | End: 2025-03-10

## 2025-03-10 RX ORDER — LIDOCAINE HYDROCHLORIDE 20 MG/ML
JELLY TOPICAL ONCE
OUTPATIENT
Start: 2025-03-10 | End: 2025-03-10

## 2025-03-10 RX ORDER — LIDOCAINE 40 MG/G
CREAM TOPICAL ONCE
OUTPATIENT
Start: 2025-03-10 | End: 2025-03-10

## 2025-03-10 RX ORDER — LIDOCAINE HYDROCHLORIDE 40 MG/ML
SOLUTION TOPICAL ONCE
Status: CANCELLED | OUTPATIENT
Start: 2025-03-10 | End: 2025-03-10

## 2025-03-10 RX ORDER — GENTAMICIN SULFATE 1 MG/G
OINTMENT TOPICAL ONCE
OUTPATIENT
Start: 2025-03-10 | End: 2025-03-10

## 2025-03-10 RX ORDER — TRIAMCINOLONE ACETONIDE 1 MG/G
OINTMENT TOPICAL ONCE
OUTPATIENT
Start: 2025-03-10 | End: 2025-03-10

## 2025-03-10 RX ORDER — SODIUM CHLOR/HYPOCHLOROUS ACID 0.033 %
SOLUTION, IRRIGATION IRRIGATION ONCE
OUTPATIENT
Start: 2025-03-10 | End: 2025-03-10

## 2025-03-10 RX ORDER — GINSENG 100 MG
CAPSULE ORAL ONCE
OUTPATIENT
Start: 2025-03-10 | End: 2025-03-10

## 2025-03-10 ASSESSMENT — PAIN DESCRIPTION - DESCRIPTORS: DESCRIPTORS: BURNING

## 2025-03-10 ASSESSMENT — PAIN DESCRIPTION - ORIENTATION: ORIENTATION: RIGHT

## 2025-03-10 ASSESSMENT — PAIN DESCRIPTION - PAIN TYPE: TYPE: CHRONIC PAIN

## 2025-03-10 ASSESSMENT — PAIN DESCRIPTION - LOCATION: LOCATION: LEG

## 2025-03-10 ASSESSMENT — PAIN SCALES - GENERAL: PAINLEVEL_OUTOF10: 5

## 2025-03-10 NOTE — TELEPHONE ENCOUNTER
LVM informing Amelia Aryan Dr. Giles would like for him to follow up with Dr. Page and to please call office to schedule appt.

## 2025-03-10 NOTE — TELEPHONE ENCOUNTER
ANTICOAGULATION SERVICE    Justin Baeza is a 59 y.o. male who seen in our clinic for anticoagulation monitoring and adjustment.      Prescription called to pharmacy:  Pharmacy:  [facility] Phone:  [phone]   Warfarin strength:  5 mg Number of tablets:  135   Sig:  Take 1 tablet on Mondays and Fridays and take 1.5 tablets all other days or as directed by clinic Refills:  3   Physician:  Dr. Keily Orozco       Please call [facility] at [phone] with any questions.     Thanks!  Yana Katz, PharmD, BCPS  [facility]  [place]: [phone]  [facility]: [phone]  3/10/2025 9:27 AM        For Pharmacy Admin Tracking Only    Intervention Detail: Refill(s) Provided  Total # of Interventions Recommended: 1  Total # of Interventions Accepted: 1  Time Spent (min): 15

## 2025-03-10 NOTE — PATIENT INSTRUCTIONS
Wound Care Center Physician Orders and Discharge Instructions  [facility]  [address] [facility]. [address] [place] [address]  Telephone: [phone]      FAX [phone]    NAME:  Justin Baeza  YOB: 1965  MEDICAL RECORD NUMBER:  [id number]  DATE:  3/10/2025      Wound care:  Continue to follow the instructions and recommendations from your last doctor visit.  The dressing(s) applied is the same as your last visit. Please refer to your last discharge instruction for the information on your wound care.      If there were any changes made, please follow the instructions as written here: none    Future Appointments     Future Appointments   Date Time Provider Department Center   3/13/2025  8:00 AM Khang Giles MD [facility] WOUND [facility]   3/17/2025  9:00 AM [facility] MEDICATION MANAGEMENT [facility]           Your nurse  is:  yesenia     Electronically signed by Yesenia Hawkins RN on 3/10/2025 at 8:23 AM     Wound Care Center Information: Should you experience any significant changes in your wound(s) or have questions about your wound care, please contact the [facility] W[facility] Center at [phone]. Our hours vary so please leave a message. Please give us 24-48 hours to return your call.   If you need help with your wounds and cannot wait until we are available, contact your PCP or go to the hospital emergency room.       Physician orders by:  Dr. Giles        The information contained in the After Visit Summary has been reviewed with me, the patient and/or responsible adult, by my health care provider(s).  I had the opportunity to ask questions regarding this information.  I have elected to receive;      [] Patient unable to sign Discharge Instructions. Given to ECF/Transportation/POA

## 2025-03-10 NOTE — TELEPHONE ENCOUNTER
Received a vm from patient stating \"medicine is coming back out.\"    I called and spoke with patient. Patient is still having issues with line flushing and states \"it's coming back out at my hand.\" I reached out to Sarahy with [facility] and asked for the nurse to call me. I spoke with home care nurse Rodney. She states patient is scheduled for tomorrow but she will try to see patient today if need be.  MD and RPH aware of above situation.

## 2025-03-10 NOTE — PROGRESS NOTES
Multilayer Compression Wrap   (Not Unna) Below the Knee    NAME:  Justin Baeza  YOB: 1965  MEDICAL RECORD NUMBER:  [id number]  DATE:  3/10/2025       Removed old Multilayer wrap if present and washed leg with mild soap/water.   Applied moisturizing agent to dry skin as needed.    Applied primary and secondary dressing as ordered    Applied multilayered dressing below the knee to Right lower leg(s)  (4 Layer Compression Wrap ) .    Instructed patient/caregiver not to remove dressing and to keep it clean and dry.    Instructed patient/caregiver on complications to report to provider, such as pain, numbness in toes, heavy drainage, and slippage of dressing.    Instructed patient on purpose of compression dressing and on activity and exercise recommendations.   Applied per   Guidelines    Electronically signed by Yesenia Hawkins RN on 3/10/25 at 8:23 AM EDT

## 2025-03-11 ENCOUNTER — TELEPHONE (OUTPATIENT)
Dept: INFECTIOUS DISEASES | Age: 60
End: 2025-03-11

## 2025-03-11 NOTE — TELEPHONE ENCOUNTER
Noted - midline not working (placed 2/18)  Due to end 3/12, leg improved, seen at [facility] for 3 layer wrap change on 3/11    Will end iv / remove line  Called and left pt a message on VM

## 2025-03-11 NOTE — TELEPHONE ENCOUNTER
Patient called and stated that midline is not working for 2nd day and unsure what to do.  He would like to speak with MD.    Thank you.

## 2025-03-12 NOTE — TELEPHONE ENCOUNTER
OPAT End  Call made to pharmacy,spoke with pharmacist, Anish, and gave VO to end IV ATB and remove Midline    Call made to [facility], spoke with Karlene RN caring for pt, and gave VO to remove Midline    Call made to patient    Will f/u in 1-2 days to verify line removal    3/14/25  Spoke with [employer] .  She stated pt was DC today from services and Midline was removed

## 2025-03-13 ENCOUNTER — HOSPITAL ENCOUNTER (OUTPATIENT)
Dept: WOUND CARE | Age: 60
Discharge: HOME | End: 2025-03-13
Attending: SPECIALIST
Payer: COMMERCIAL

## 2025-03-13 VITALS
DIASTOLIC BLOOD PRESSURE: 82 MMHG | TEMPERATURE: 96.8 F | HEART RATE: 76 BPM | RESPIRATION RATE: 16 BRPM | SYSTOLIC BLOOD PRESSURE: 130 MMHG

## 2025-03-13 DIAGNOSIS — L97.909 VENOUS ULCER (HCC): ICD-10-CM

## 2025-03-13 DIAGNOSIS — I89.0 CHRONIC ACQUIRED LYMPHEDEMA: Primary | ICD-10-CM

## 2025-03-13 DIAGNOSIS — I89.0 LYMPHEDEMA: ICD-10-CM

## 2025-03-13 DIAGNOSIS — I83.009 VENOUS ULCER (HCC): ICD-10-CM

## 2025-03-13 DIAGNOSIS — I87.331 IDIOPATHIC CHRONIC VENOUS HYPERTENSION OF RIGHT LOWER EXTREMITY WITH ULCER AND INFLAMMATION (HCC): ICD-10-CM

## 2025-03-13 DIAGNOSIS — Z91.199 NONCOMPLIANCE: ICD-10-CM

## 2025-03-13 PROCEDURE — 29581 APPL MULTLAYER CMPRN SYS LEG: CPT

## 2025-03-13 PROCEDURE — 11045 DBRDMT SUBQ TISS EACH ADDL: CPT

## 2025-03-13 PROCEDURE — 11042 DBRDMT SUBQ TIS 1ST 20SQCM/<: CPT

## 2025-03-13 PROCEDURE — 6370000000 HC RX 637 (ALT 250 FOR IP): Performed by: SPECIALIST

## 2025-03-13 RX ORDER — LIDOCAINE 50 MG/G
OINTMENT TOPICAL ONCE
OUTPATIENT
Start: 2025-03-13 | End: 2025-03-13

## 2025-03-13 RX ORDER — LIDOCAINE HYDROCHLORIDE 20 MG/ML
JELLY TOPICAL ONCE
OUTPATIENT
Start: 2025-03-13 | End: 2025-03-13

## 2025-03-13 RX ORDER — BETAMETHASONE DIPROPIONATE 0.05 %
OINTMENT (GRAM) TOPICAL ONCE
OUTPATIENT
Start: 2025-03-13 | End: 2025-03-13

## 2025-03-13 RX ORDER — SILVER SULFADIAZINE 10 MG/G
CREAM TOPICAL ONCE
OUTPATIENT
Start: 2025-03-13 | End: 2025-03-13

## 2025-03-13 RX ORDER — LIDOCAINE 40 MG/G
CREAM TOPICAL ONCE
OUTPATIENT
Start: 2025-03-13 | End: 2025-03-13

## 2025-03-13 RX ORDER — CLOBETASOL PROPIONATE 0.5 MG/G
OINTMENT TOPICAL ONCE
OUTPATIENT
Start: 2025-03-13 | End: 2025-03-13

## 2025-03-13 RX ORDER — GINSENG 100 MG
CAPSULE ORAL ONCE
OUTPATIENT
Start: 2025-03-13 | End: 2025-03-13

## 2025-03-13 RX ORDER — GENTAMICIN SULFATE 1 MG/G
OINTMENT TOPICAL ONCE
OUTPATIENT
Start: 2025-03-13 | End: 2025-03-13

## 2025-03-13 RX ORDER — LIDOCAINE HYDROCHLORIDE 40 MG/ML
SOLUTION TOPICAL ONCE
Status: COMPLETED | OUTPATIENT
Start: 2025-03-13 | End: 2025-03-13

## 2025-03-13 RX ORDER — MUPIROCIN 20 MG/G
OINTMENT TOPICAL ONCE
OUTPATIENT
Start: 2025-03-13 | End: 2025-03-13

## 2025-03-13 RX ORDER — LIDOCAINE HYDROCHLORIDE 40 MG/ML
SOLUTION TOPICAL ONCE
OUTPATIENT
Start: 2025-03-13 | End: 2025-03-13

## 2025-03-13 RX ORDER — TRIAMCINOLONE ACETONIDE 1 MG/G
OINTMENT TOPICAL ONCE
OUTPATIENT
Start: 2025-03-13 | End: 2025-03-13

## 2025-03-13 RX ORDER — NEOMYCIN/BACITRACIN/POLYMYXINB 3.5-400-5K
OINTMENT (GRAM) TOPICAL ONCE
OUTPATIENT
Start: 2025-03-13 | End: 2025-03-13

## 2025-03-13 RX ORDER — BACITRACIN ZINC AND POLYMYXIN B SULFATE 500; 1000 [USP'U]/G; [USP'U]/G
OINTMENT TOPICAL ONCE
OUTPATIENT
Start: 2025-03-13 | End: 2025-03-13

## 2025-03-13 RX ORDER — SODIUM CHLOR/HYPOCHLOROUS ACID 0.033 %
SOLUTION, IRRIGATION IRRIGATION ONCE
OUTPATIENT
Start: 2025-03-13 | End: 2025-03-13

## 2025-03-13 RX ADMIN — LIDOCAINE HYDROCHLORIDE: 40 SOLUTION TOPICAL at 08:34

## 2025-03-13 ASSESSMENT — PAIN DESCRIPTION - LOCATION: LOCATION: LEG

## 2025-03-13 ASSESSMENT — PAIN DESCRIPTION - PAIN TYPE: TYPE: CHRONIC PAIN

## 2025-03-13 ASSESSMENT — PAIN SCALES - GENERAL: PAINLEVEL_OUTOF10: 4

## 2025-03-13 ASSESSMENT — PAIN DESCRIPTION - ORIENTATION: ORIENTATION: RIGHT

## 2025-03-13 NOTE — PATIENT INSTRUCTIONS
Wound Care Center Physician Orders and Discharge Instructions  [facility]   [address]  Telephone: [phone] FAX [phone]  NAME:  Justin Baeza  YOB: 1965  MEDICAL RECORD NUMBER:  [id number]  DATE: 3/13/2025      Return Appointment:  Return Appointment: With Khang Giles MD  in 1 Week(s)  [x] Return Appointment for a Wound Assessment with the nurse on:  Mondays for dressing change if needed    Future Appointments   Date Time Provider Department Center   3/17/2025  9:00 AM [facility] MEDICATION MANAGEMENT [facility] MM [facility]   3/25/2025  1:00 PM La Nena Page MD [facility] MMA               Orders Placed This Encounter   Procedures    Initiate Outpatient Wound Care Protocol       Home Care Company: none    Medically necessary services for evaluation and treatment: []Skilled Nursing (using clean technique) []PT (Eval & Treat) []OT (Eval & Treat) []Social Work []Dietician []Other:      Wound care instructions:  If you smoke we ask that you refrain from smoking. Smoking inhibits wounds from healing.  When taking antibiotics take the entire prescription as ordered. Do not stop taking until medication is all gone unless otherwise instructed.   Exercise as tolerated.   Keep weight off wounds and reposition every 2 hours if applicable.  Do not get wounds wet in the bath or shower unless otherwise instructed by your physician. If your wound is on your foot or leg, you may purchase a cast bag. Please ask at the pharmacy.  Wash hands with soap and water prior to and after every dressing change.    [x]Wash wounds with: No need to wash. Leave dressing in place.    [x]Kiah wound Topical Treatments: Do not apply lotions, creams, or ointments to the skin around the wound bed unless directed as followed:   Apply around the wound:   triad cream - applied by dr. Giles         [x]Wound Location: right lower leg wounds  Apply Primary Dressing to wound:

## 2025-03-13 NOTE — PROGRESS NOTES
Multilayer Compression Wrap   (Not Unna) Below the Knee    NAME:  Justin Baeza  YOB: 1965  MEDICAL RECORD NUMBER:  [id number]  DATE:  3/13/2025       Removed old Multilayer wrap if present and washed leg with mild soap/water.   Applied moisturizing agent to dry skin as needed.    Applied primary and secondary dressing as ordered    Applied multilayered dressing below the knee to Right lower leg(s)  (4 Layer Compression Wrap ) .    Instructed patient/caregiver not to remove dressing and to keep it clean and dry.    Instructed patient/caregiver on complications to report to provider, such as pain, numbness in toes, heavy drainage, and slippage of dressing.    Instructed patient on purpose of compression dressing and on activity and exercise recommendations.   Applied per   Guidelines    Electronically signed by Yesenia Hawkins RN on 3/13/25 at 9:07 AM EDT      
3/13/2025 at 9:00 AM     Wound Care Center Information: Should you experience any significant changes in your wound(s) or have questions about your wound care, please contact the [facility] at [phone].   Hours of operation:  Mon:  8AM - 2PM  Tue: 11AM - 5PM  Wed: CLOSED  Thur: 8AM - 4:30PM  Fri:  8AM - 4:30PM  The office is closed on all major holidays.    Please give us 24-48 business hours to return your call.  These hours of operation are subject to change. If you need help with your wounds and cannot wait until we are available, contact your PCP or go to your preferred emergency room.     Call your doctor now or seek immediate medical care if:    You have symptoms of infection, such as:  Increased pain, swelling, warmth, or redness.  Red streaks leading from the area.  Pus draining from the area.  A fever.       Electronically signed by CHICO VELIZ MD on 3/13/2025 at 9:13 AM

## 2025-03-17 ENCOUNTER — HOSPITAL ENCOUNTER (OUTPATIENT)
Dept: WOUND CARE | Age: 60
Discharge: HOME | End: 2025-03-17
Attending: SPECIALIST
Payer: COMMERCIAL

## 2025-03-17 ENCOUNTER — ANTI-COAG VISIT (OUTPATIENT)
Dept: PHARMACY | Age: 60
End: 2025-03-17
Payer: COMMERCIAL

## 2025-03-17 VITALS
RESPIRATION RATE: 16 BRPM | TEMPERATURE: 96.8 F | SYSTOLIC BLOOD PRESSURE: 137 MMHG | DIASTOLIC BLOOD PRESSURE: 89 MMHG | HEART RATE: 52 BPM

## 2025-03-17 DIAGNOSIS — I82.5Z1: Primary | ICD-10-CM

## 2025-03-17 LAB
INTERNATIONAL NORMALIZATION RATIO, POC: 2.7
PROTHROMBIN TIME, POC: 0

## 2025-03-17 PROCEDURE — 99211 OFF/OP EST MAY X REQ PHY/QHP: CPT

## 2025-03-17 PROCEDURE — 85610 PROTHROMBIN TIME: CPT

## 2025-03-17 PROCEDURE — 29581 APPL MULTLAYER CMPRN SYS LEG: CPT

## 2025-03-17 ASSESSMENT — PAIN SCALES - GENERAL: PAINLEVEL_OUTOF10: 4

## 2025-03-17 ASSESSMENT — PAIN DESCRIPTION - LOCATION: LOCATION: LEG

## 2025-03-17 ASSESSMENT — PAIN DESCRIPTION - PAIN TYPE: TYPE: CHRONIC PAIN

## 2025-03-17 ASSESSMENT — PAIN DESCRIPTION - FREQUENCY: FREQUENCY: CONTINUOUS

## 2025-03-17 ASSESSMENT — PAIN DESCRIPTION - ORIENTATION: ORIENTATION: RIGHT

## 2025-03-17 NOTE — PROGRESS NOTES
85 y/o F presenting to the ED for vomiting and abdominal pain x 1 day.     This note is for triage purposes only. This patient was seen and evaluated by myself. My evaluation is not meant to provide definitive treatment nor is this note intended to diagnose or disposition the patient unless otherwise stated.       Deneen Keller, DANA  07/26/24 1810     Multilayer Compression Wrap   (Not Unna) Below the Knee    NAME:  Justin Baeza  YOB: 1965  MEDICAL RECORD NUMBER:  [id number]  DATE:  3/17/2025       Removed old Multilayer wrap if present and washed leg with mild soap/water.   Applied moisturizing agent to dry skin as needed.    Applied primary and secondary dressing as ordered    Applied multilayered dressing below the knee to Right lower leg(s)  (4 Layer Compression Wrap ) .    Instructed patient/caregiver not to remove dressing and to keep it clean and dry.    Instructed patient/caregiver on complications to report to provider, such as pain, numbness in toes, heavy drainage, and slippage of dressing.    Instructed patient on purpose of compression dressing and on activity and exercise recommendations.   Applied per   Guidelines    Electronically signed by Kathy Gomez RN on 3/17/25 at 1:54 PM EDT

## 2025-03-17 NOTE — PROGRESS NOTES
Patient usually comes every ~8 weeks.    Lyndon Arenas, TenaD  [facility]  Office: [phone]  Fax: [phone]  3/17/2025 8:37 AM      For Pharmacy Admin Tracking Only  Time Spent (min): 15

## 2025-03-17 NOTE — PATIENT INSTRUCTIONS
Wound Care Center Physician Orders and Discharge Instructions  [facility]  [address] [facility]. [address] [place] [address]  Telephone: [phone]      FAX [phone]    NAME:  Justin Baeza  YOB: 1965  MEDICAL RECORD NUMBER:  [id number]  DATE:  3/17/2025      Wound care:  Continue to follow the instructions and recommendations from your last doctor visit.  The dressing(s) applied is the same as your last visit. Please refer to your last discharge instruction for the information on your wound care.      If there were any changes made, please follow the instructions as written here: none    Future Appointments     Future Appointments   Date Time Provider Department Center   3/20/2025 10:45 AM Khang Giles MD [facility] WOUND [facility]   3/25/2025  1:00 PM La Nena Page MD [facility]   4/28/2025  9:00 AM [facility] MEDICATION MANAGEMENT [facility]           Your nurse  is:  Yesenia     Electronically signed by Kathy Gomez RN on 3/17/2025 at 12:30 PM     Wound Care Center Information: Should you experience any significant changes in your wound(s) or have questions about your wound care, please contact the [facility] Center at [phone]. Our hours vary so please leave a message. Please give us 24-48 hours to return your call.   If you need help with your wounds and cannot wait until we are available, contact your PCP or go to the hospital emergency room.       Physician orders by:  Dr. Giles        The information contained in the After Visit Summary has been reviewed with me, the patient and/or responsible adult, by my health care provider(s).  I had the opportunity to ask questions regarding this information.  I have elected to receive;      [] Patient unable to sign Discharge Instructions. Given to ECF/Transportation/POA

## 2025-03-20 ENCOUNTER — HOSPITAL ENCOUNTER (OUTPATIENT)
Dept: WOUND CARE | Age: 60
End: 2025-03-20
Attending: SPECIALIST
Payer: COMMERCIAL

## 2025-03-24 ENCOUNTER — HOSPITAL ENCOUNTER (OUTPATIENT)
Dept: WOUND CARE | Age: 60
Discharge: HOME | End: 2025-03-24
Attending: SPECIALIST
Payer: COMMERCIAL

## 2025-03-24 VITALS
HEART RATE: 76 BPM | TEMPERATURE: 96 F | DIASTOLIC BLOOD PRESSURE: 74 MMHG | SYSTOLIC BLOOD PRESSURE: 154 MMHG | RESPIRATION RATE: 16 BRPM

## 2025-03-24 DIAGNOSIS — Z91.199 NONCOMPLIANCE: ICD-10-CM

## 2025-03-24 DIAGNOSIS — L97.909 VENOUS ULCER (HCC): ICD-10-CM

## 2025-03-24 DIAGNOSIS — I89.0 LYMPHEDEMA: ICD-10-CM

## 2025-03-24 DIAGNOSIS — I89.0 CHRONIC ACQUIRED LYMPHEDEMA: Primary | ICD-10-CM

## 2025-03-24 DIAGNOSIS — I83.009 VENOUS ULCER (HCC): ICD-10-CM

## 2025-03-24 DIAGNOSIS — I87.331 IDIOPATHIC CHRONIC VENOUS HYPERTENSION OF RIGHT LOWER EXTREMITY WITH ULCER AND INFLAMMATION: ICD-10-CM

## 2025-03-24 PROCEDURE — 11045 DBRDMT SUBQ TISS EACH ADDL: CPT

## 2025-03-24 PROCEDURE — 11042 DBRDMT SUBQ TIS 1ST 20SQCM/<: CPT | Performed by: SPECIALIST

## 2025-03-24 PROCEDURE — 6370000000 HC RX 637 (ALT 250 FOR IP): Performed by: SPECIALIST

## 2025-03-24 PROCEDURE — 29581 APPL MULTLAYER CMPRN SYS LEG: CPT

## 2025-03-24 PROCEDURE — 11042 DBRDMT SUBQ TIS 1ST 20SQCM/<: CPT

## 2025-03-24 PROCEDURE — 11045 DBRDMT SUBQ TISS EACH ADDL: CPT | Performed by: SPECIALIST

## 2025-03-24 RX ORDER — SILVER SULFADIAZINE 10 MG/G
CREAM TOPICAL ONCE
OUTPATIENT
Start: 2025-03-24 | End: 2025-03-24

## 2025-03-24 RX ORDER — LIDOCAINE HYDROCHLORIDE 40 MG/ML
SOLUTION TOPICAL ONCE
OUTPATIENT
Start: 2025-03-24 | End: 2025-03-24

## 2025-03-24 RX ORDER — GINSENG 100 MG
CAPSULE ORAL ONCE
OUTPATIENT
Start: 2025-03-24 | End: 2025-03-24

## 2025-03-24 RX ORDER — BETAMETHASONE DIPROPIONATE 0.05 %
OINTMENT (GRAM) TOPICAL ONCE
OUTPATIENT
Start: 2025-03-24 | End: 2025-03-24

## 2025-03-24 RX ORDER — SODIUM CHLOR/HYPOCHLOROUS ACID 0.033 %
SOLUTION, IRRIGATION IRRIGATION ONCE
OUTPATIENT
Start: 2025-03-24 | End: 2025-03-24

## 2025-03-24 RX ORDER — LIDOCAINE HYDROCHLORIDE 20 MG/ML
JELLY TOPICAL ONCE
OUTPATIENT
Start: 2025-03-24 | End: 2025-03-24

## 2025-03-24 RX ORDER — BACITRACIN ZINC AND POLYMYXIN B SULFATE 500; 1000 [USP'U]/G; [USP'U]/G
OINTMENT TOPICAL ONCE
OUTPATIENT
Start: 2025-03-24 | End: 2025-03-24

## 2025-03-24 RX ORDER — LIDOCAINE HYDROCHLORIDE 40 MG/ML
SOLUTION TOPICAL ONCE
Status: COMPLETED | OUTPATIENT
Start: 2025-03-24 | End: 2025-03-24

## 2025-03-24 RX ORDER — MUPIROCIN 20 MG/G
OINTMENT TOPICAL ONCE
OUTPATIENT
Start: 2025-03-24 | End: 2025-03-24

## 2025-03-24 RX ORDER — GENTAMICIN SULFATE 1 MG/G
OINTMENT TOPICAL ONCE
OUTPATIENT
Start: 2025-03-24 | End: 2025-03-24

## 2025-03-24 RX ORDER — CLOBETASOL PROPIONATE 0.5 MG/G
OINTMENT TOPICAL ONCE
OUTPATIENT
Start: 2025-03-24 | End: 2025-03-24

## 2025-03-24 RX ORDER — LIDOCAINE 40 MG/G
CREAM TOPICAL ONCE
OUTPATIENT
Start: 2025-03-24 | End: 2025-03-24

## 2025-03-24 RX ORDER — NEOMYCIN/BACITRACIN/POLYMYXINB 3.5-400-5K
OINTMENT (GRAM) TOPICAL ONCE
OUTPATIENT
Start: 2025-03-24 | End: 2025-03-24

## 2025-03-24 RX ORDER — TRIAMCINOLONE ACETONIDE 1 MG/G
OINTMENT TOPICAL ONCE
OUTPATIENT
Start: 2025-03-24 | End: 2025-03-24

## 2025-03-24 RX ORDER — LIDOCAINE 50 MG/G
OINTMENT TOPICAL ONCE
OUTPATIENT
Start: 2025-03-24 | End: 2025-03-24

## 2025-03-24 RX ADMIN — LIDOCAINE HYDROCHLORIDE: 40 SOLUTION TOPICAL at 08:11

## 2025-03-24 NOTE — PATIENT INSTRUCTIONS
Wound Care Center Physician Orders and Discharge Instructions  [facility]   [address]  Telephone: [phone] FAX [phone]  NAME:  Justin Baeza  YOB: 1965  MEDICAL RECORD NUMBER:  [id number]  DATE: 3/24/2025      Return Appointment:  Return Appointment: With Khang Giles MD  in 1 Week(s)  [x] Return Appointment for a Wound Assessment with the nurse on:  Thursday or Friday for dressing change if needed    Future Appointments   Date Time Provider Department Center   3/25/2025  1:00 PM La Nena Page MD [facility]   4/28/2025  9:00 AM [facility] MEDICATION MANAGEMENT [facility]               Orders Placed This Encounter   Procedures    Initiate Outpatient Wound Care Protocol       Home Care Company: none    Medically necessary services for evaluation and treatment: []Skilled Nursing (using clean technique) []PT (Eval & Treat) []OT (Eval & Treat) []Social Work []Dietician []Other:      Wound care instructions:  If you smoke we ask that you refrain from smoking. Smoking inhibits wounds from healing.  When taking antibiotics take the entire prescription as ordered. Do not stop taking until medication is all gone unless otherwise instructed.   Exercise as tolerated.   Keep weight off wounds and reposition every 2 hours if applicable.  Do not get wounds wet in the bath or shower unless otherwise instructed by your physician. If your wound is on your foot or leg, you may purchase a cast bag. Please ask at the pharmacy.  Wash hands with soap and water prior to and after every dressing change.    [x]Wash wounds with: No need to wash. Leave dressing in place.    [x]Kiah wound Topical Treatments: Do not apply lotions, creams, or ointments to the skin around the wound bed unless directed as followed:   Apply around the wound:  zinc paste        [x]Wound Location: right lower leg wounds  Apply Primary Dressing to wound: polymem AG  Secondary

## 2025-03-24 NOTE — PROGRESS NOTES
.Multilayer Compression Wrap   (Not Unna) Below the Knee    NAME:  Justin Baeza  YOB: 1965  MEDICAL RECORD NUMBER:  [id number]  DATE:  3/24/2025       Removed old Multilayer wrap if present and washed leg with mild soap/water.   Applied moisturizing agent to dry skin as needed.    Applied primary and secondary dressing as ordered    Applied multilayered dressing below the knee to    lower leg(s)  (4 Layer Compression Wrap ) .    Instructed patient/caregiver not to remove dressing and to keep it clean and dry.    Instructed patient/caregiver on complications to report to provider, such as pain, numbness in toes, heavy drainage, and slippage of dressing.    Instructed patient on purpose of compression dressing and on activity and exercise recommendations.   Applied per   Guidelines    Electronically signed by Kathy Gomez RN on 3/24/25 at 8:54 AM EDT        
Do not apply lotions, creams, or ointments to the skin around the wound bed unless directed as followed:   Apply around the wound:  zinc paste        [x]Wound Location: right lower leg wounds  Apply Primary Dressing to wound: polymem AG  Secondary Dressing: Extra absorbant pad   Avoid contact of tape with skin if possible.  When to change Dressing: DO NOT CHANGE      [x] Multilayer Compression Wrap:  Type: Applied on Right lower leg(s)  4 Layer Compression Wrap- second layer of cast padding below calf     Do not get leg(s) with wrap wet.  If wraps become too tight call the center or completely remove the wrap.   Elevate leg(s) above the level of the heart when sitting.  Avoid prolonged standing in one place.   Applied in Clinic on 3/24/2025  The Goal of this therapy is to reduce edema and get into long term compression garments to control venous insufficiency, lymphedema and reduce occurrence of venous ulcers    [x] Edema Control: 30-40mmHG  Apply: Compression Stocking on the Left leg  Apply every morning immediately when getting up. Remove every night before going to bed unless instructed otherwise     Elevate leg(s) above the level of the heart for 30 minutes 4-5 times a day and/or when sitting.  Avoid prolonged standing in one place.    [x] Lymphedema Therapy:  Wear Lymphedema pumps twice a day at settings prescribed by your physician.   Avoid prolonged standing in one place.      Dietary:  Important dietary reminders:  1. Increase Protein intake (i.e. Lean meats, fish, eggs, legumes, and yogurt)  2. No added salt  3. If diabetic, follow a diabetic diet and check glucose prior to meals or as instructed by your physician.    Dietary Supplements(Take twice a day unless instructed otherwise):  [] Papito  [] 30ml ProStat [] Ensure Complete [] Ensure Max/Premier [] Expedite [] Other:    Your nurse  is:  talisha     Electronically signed by Talisha Hawkins RN on 3/24/2025 at 8:51 AM     Wound Care Center

## 2025-03-27 ENCOUNTER — HOSPITAL ENCOUNTER (OUTPATIENT)
Dept: WOUND CARE | Age: 60
Discharge: HOME | End: 2025-03-27
Attending: SPECIALIST
Payer: COMMERCIAL

## 2025-03-27 VITALS
SYSTOLIC BLOOD PRESSURE: 150 MMHG | RESPIRATION RATE: 16 BRPM | HEART RATE: 75 BPM | DIASTOLIC BLOOD PRESSURE: 75 MMHG | TEMPERATURE: 97 F

## 2025-03-27 PROCEDURE — 29581 APPL MULTLAYER CMPRN SYS LEG: CPT

## 2025-03-27 ASSESSMENT — PAIN DESCRIPTION - FREQUENCY: FREQUENCY: CONTINUOUS

## 2025-03-27 ASSESSMENT — PAIN DESCRIPTION - ORIENTATION: ORIENTATION: RIGHT

## 2025-03-27 ASSESSMENT — PAIN SCALES - GENERAL: PAINLEVEL_OUTOF10: 3

## 2025-03-27 ASSESSMENT — PAIN DESCRIPTION - LOCATION: LOCATION: LEG

## 2025-03-27 ASSESSMENT — PAIN DESCRIPTION - DESCRIPTORS: DESCRIPTORS: BURNING

## 2025-03-27 ASSESSMENT — PAIN DESCRIPTION - ONSET: ONSET: ON-GOING

## 2025-03-27 ASSESSMENT — PAIN DESCRIPTION - PAIN TYPE: TYPE: CHRONIC PAIN

## 2025-03-27 NOTE — PROGRESS NOTES
.Multilayer Compression Wrap   (Not Unna) Below the Knee    NAME:  Justin Baeza  YOB: 1965  MEDICAL RECORD NUMBER:  [id number]  DATE:  3/27/2025       Removed old Multilayer wrap if present and washed leg with mild soap/water.   Applied moisturizing agent to dry skin as needed.    Applied primary and secondary dressing as ordered    Applied multilayered dressing below the knee to Right lower leg(s)  (4 Layer Compression Wrap ) .    Instructed patient/caregiver not to remove dressing and to keep it clean and dry.    Instructed patient/caregiver on complications to report to provider, such as pain, numbness in toes, heavy drainage, and slippage of dressing.    Instructed patient on purpose of compression dressing and on activity and exercise recommendations.   Applied per   Guidelines    Electronically signed by Kathy Gomez RN on 3/27/25 at 12:09 PM EDT

## 2025-03-31 ENCOUNTER — HOSPITAL ENCOUNTER (OUTPATIENT)
Dept: WOUND CARE | Age: 60
Discharge: HOME | End: 2025-03-31
Attending: SPECIALIST
Payer: COMMERCIAL

## 2025-03-31 VITALS
SYSTOLIC BLOOD PRESSURE: 154 MMHG | HEART RATE: 78 BPM | DIASTOLIC BLOOD PRESSURE: 82 MMHG | TEMPERATURE: 96 F | RESPIRATION RATE: 16 BRPM

## 2025-03-31 DIAGNOSIS — Z91.199 NONCOMPLIANCE: ICD-10-CM

## 2025-03-31 DIAGNOSIS — I87.331 IDIOPATHIC CHRONIC VENOUS HYPERTENSION OF RIGHT LOWER EXTREMITY WITH ULCER AND INFLAMMATION: ICD-10-CM

## 2025-03-31 DIAGNOSIS — I83.009 VENOUS ULCER (HCC): ICD-10-CM

## 2025-03-31 DIAGNOSIS — L97.909 VENOUS ULCER (HCC): ICD-10-CM

## 2025-03-31 DIAGNOSIS — I89.0 CHRONIC ACQUIRED LYMPHEDEMA: Primary | ICD-10-CM

## 2025-03-31 DIAGNOSIS — I89.0 LYMPHEDEMA: ICD-10-CM

## 2025-03-31 PROCEDURE — 11045 DBRDMT SUBQ TISS EACH ADDL: CPT | Performed by: SPECIALIST

## 2025-03-31 PROCEDURE — 11042 DBRDMT SUBQ TIS 1ST 20SQCM/<: CPT | Performed by: SPECIALIST

## 2025-03-31 PROCEDURE — 29581 APPL MULTLAYER CMPRN SYS LEG: CPT

## 2025-03-31 PROCEDURE — 11042 DBRDMT SUBQ TIS 1ST 20SQCM/<: CPT

## 2025-03-31 PROCEDURE — 6370000000 HC RX 637 (ALT 250 FOR IP): Performed by: SPECIALIST

## 2025-03-31 PROCEDURE — 11045 DBRDMT SUBQ TISS EACH ADDL: CPT

## 2025-03-31 RX ORDER — NEOMYCIN/BACITRACIN/POLYMYXINB 3.5-400-5K
OINTMENT (GRAM) TOPICAL ONCE
OUTPATIENT
Start: 2025-03-31 | End: 2025-03-31

## 2025-03-31 RX ORDER — CLOBETASOL PROPIONATE 0.5 MG/G
OINTMENT TOPICAL ONCE
OUTPATIENT
Start: 2025-03-31 | End: 2025-03-31

## 2025-03-31 RX ORDER — MUPIROCIN 20 MG/G
OINTMENT TOPICAL ONCE
OUTPATIENT
Start: 2025-03-31 | End: 2025-03-31

## 2025-03-31 RX ORDER — LIDOCAINE HYDROCHLORIDE 20 MG/ML
JELLY TOPICAL ONCE
OUTPATIENT
Start: 2025-03-31 | End: 2025-03-31

## 2025-03-31 RX ORDER — LIDOCAINE HYDROCHLORIDE 40 MG/ML
SOLUTION TOPICAL ONCE
OUTPATIENT
Start: 2025-03-31 | End: 2025-03-31

## 2025-03-31 RX ORDER — SILVER SULFADIAZINE 10 MG/G
CREAM TOPICAL ONCE
OUTPATIENT
Start: 2025-03-31 | End: 2025-03-31

## 2025-03-31 RX ORDER — LIDOCAINE HYDROCHLORIDE 40 MG/ML
SOLUTION TOPICAL ONCE
Status: COMPLETED | OUTPATIENT
Start: 2025-03-31 | End: 2025-03-31

## 2025-03-31 RX ORDER — GINSENG 100 MG
CAPSULE ORAL ONCE
OUTPATIENT
Start: 2025-03-31 | End: 2025-03-31

## 2025-03-31 RX ORDER — LIDOCAINE 40 MG/G
CREAM TOPICAL ONCE
OUTPATIENT
Start: 2025-03-31 | End: 2025-03-31

## 2025-03-31 RX ORDER — BETAMETHASONE DIPROPIONATE 0.05 %
OINTMENT (GRAM) TOPICAL ONCE
OUTPATIENT
Start: 2025-03-31 | End: 2025-03-31

## 2025-03-31 RX ORDER — LIDOCAINE 50 MG/G
OINTMENT TOPICAL ONCE
OUTPATIENT
Start: 2025-03-31 | End: 2025-03-31

## 2025-03-31 RX ORDER — SODIUM CHLOR/HYPOCHLOROUS ACID 0.033 %
SOLUTION, IRRIGATION IRRIGATION ONCE
OUTPATIENT
Start: 2025-03-31 | End: 2025-03-31

## 2025-03-31 RX ORDER — BACITRACIN ZINC AND POLYMYXIN B SULFATE 500; 1000 [USP'U]/G; [USP'U]/G
OINTMENT TOPICAL ONCE
OUTPATIENT
Start: 2025-03-31 | End: 2025-03-31

## 2025-03-31 RX ORDER — GENTAMICIN SULFATE 1 MG/G
OINTMENT TOPICAL ONCE
OUTPATIENT
Start: 2025-03-31 | End: 2025-03-31

## 2025-03-31 RX ORDER — TRIAMCINOLONE ACETONIDE 1 MG/G
OINTMENT TOPICAL ONCE
OUTPATIENT
Start: 2025-03-31 | End: 2025-03-31

## 2025-03-31 RX ADMIN — LIDOCAINE HYDROCHLORIDE: 40 SOLUTION TOPICAL at 08:41

## 2025-03-31 ASSESSMENT — PAIN SCALES - GENERAL: PAINLEVEL_OUTOF10: 3

## 2025-03-31 ASSESSMENT — PAIN DESCRIPTION - DESCRIPTORS: DESCRIPTORS: BURNING

## 2025-03-31 ASSESSMENT — PAIN DESCRIPTION - PAIN TYPE: TYPE: CHRONIC PAIN

## 2025-03-31 ASSESSMENT — PAIN DESCRIPTION - FREQUENCY: FREQUENCY: CONTINUOUS

## 2025-03-31 ASSESSMENT — PAIN DESCRIPTION - ORIENTATION: ORIENTATION: RIGHT

## 2025-03-31 NOTE — PROGRESS NOTES
Multilayer Compression Wrap   (Not Unna) Below the Knee    NAME:  Justin Baeza  YOB: 1965  MEDICAL RECORD NUMBER:  [id number]  DATE:  3/31/2025       Removed old Multilayer wrap if present and washed leg with mild soap/water.   Applied moisturizing agent to dry skin as needed.    Applied primary and secondary dressing as ordered    Applied multilayered dressing below the knee to Right lower leg(s)  (4 Layer Compression Wrap ) .    Instructed patient/caregiver not to remove dressing and to keep it clean and dry.    Instructed patient/caregiver on complications to report to provider, such as pain, numbness in toes, heavy drainage, and slippage of dressing.    Instructed patient on purpose of compression dressing and on activity and exercise recommendations.   Applied per   Guidelines    Electronically signed by Kathy Gomez RN on 3/31/25 at 9:37 AM EDT        
preponderance of granulation tissue involving medial, anterior, lateral, and posterior right knee.  There is presence of minimal epithelialization along the margins of all ulcers.  Less periwound maceration noted posteriorly.  Large varicosities with dermal fibrosis and thickening throughout right knee along with hyperpigmentation    Assessment:      1. Chronic acquired lymphedema    2. Idiopathic chronic venous hypertension of right lower extremity with ulcer and inflammation (HCC)    3. Venous ulcer (HCC)    4. Noncompliance    5. Lymphedema         Procedure Note  Indications:  Based on my examination of this patient's wound(s) today, sharp excision is required to promote healing and evaluate the extent healing.    Performed by: CHICO VELIZ MD    Consent obtained? Yes    Time out taken: Yes    Pain Control: Anesthetic: 4% Lidocaine Liquid Topical     Debridement:Excisional Debridement    Using curette the wound was sharply debrided    down through and including the removal of  epidermis, dermis, and subcutaneous tissue.    Devitalized Tissue Debrided:  fibrin, biofilm, and slough      Pre Debridement Measurements:  Are located in the Wound Documentation Flow Sheet   Wound #: 1, 2, 3, and 4     Post  Debridement Measurements:  Wound 07/25/22 Leg Right;Lower;Medial;Proximal #1 (Active)   Wound Image   03/31/25 0822   Wound Etiology Venous 02/06/25 0826   Dressing Status New dressing applied 02/08/24 0843   Wound Cleansed Vashe 03/31/25 0908   Dressing/Treatment Foam impregnated with Ag 03/31/25 0936   Wound Length (cm) 1.2 cm 03/31/25 0822   Wound Width (cm) 1.9 cm 03/31/25 0822   Wound Depth (cm) 0.1 cm 03/31/25 0822   Wound Surface Area (cm^2) 2.28 cm^2 03/31/25 0822   Change in Wound Size % (l*w) 90.36 03/31/25 0822   Wound Volume (cm^3) 0.228 cm^3 03/31/25 0822   Wound Healing % 90 03/31/25 0822   Post-Procedure Length (cm) 1.3 cm 03/31/25 0908   Post-Procedure Width (cm) 2 cm 03/31/25 0908

## 2025-03-31 NOTE — PATIENT INSTRUCTIONS
Wound Care Center Physician Orders and Discharge Instructions  [facility]   [address]  Telephone: [phone] FAX [phone]  NAME:  Justin Baeza  YOB: 1965  MEDICAL RECORD NUMBER:  [id number]  DATE: 3/31/2025      Return Appointment:  Return Appointment: With Khang Giles MD  in 1 Week(s)  [x] Return Appointment for a Wound Assessment with the nurse on: Tuesday, 4/1/25    Future Appointments   Date Time Provider Department Center   4/1/2025 11:45 AM La Nena Page MD [facility]   4/28/2025  9:00 AM [facility] MEDICATION MANAGEMENT [facility]               Orders Placed This Encounter   Procedures    Initiate Outpatient Wound Care Protocol       Home Care Company: none    Medically necessary services for evaluation and treatment: []Skilled Nursing (using clean technique) []PT (Eval & Treat) []OT (Eval & Treat) []Social Work []Dietician []Other:      Wound care instructions:  If you smoke we ask that you refrain from smoking. Smoking inhibits wounds from healing.  When taking antibiotics take the entire prescription as ordered. Do not stop taking until medication is all gone unless otherwise instructed.   Exercise as tolerated.   Keep weight off wounds and reposition every 2 hours if applicable.  Do not get wounds wet in the bath or shower unless otherwise instructed by your physician. If your wound is on your foot or leg, you may purchase a cast bag. Please ask at the pharmacy.  Wash hands with soap and water prior to and after every dressing change.    [x]Wash wounds with: No need to wash. Leave dressing in place.    [x]Kiah wound Topical Treatments: Do not apply lotions, creams, or ointments to the skin around the wound bed unless directed as followed:   Apply around the wound:  zinc paste        [x]Wound Location: right lower leg wounds  Apply Primary Dressing to wound: polymem AG  Secondary Dressing: Extra absorbant

## 2025-04-01 ENCOUNTER — OFFICE VISIT (OUTPATIENT)
Dept: VASCULAR SURGERY | Age: 60
End: 2025-04-01
Payer: COMMERCIAL

## 2025-04-01 ENCOUNTER — HOSPITAL ENCOUNTER (OUTPATIENT)
Dept: WOUND CARE | Age: 60
Discharge: HOME | End: 2025-04-01
Attending: SPECIALIST
Payer: COMMERCIAL

## 2025-04-01 VITALS
RESPIRATION RATE: 16 BRPM | TEMPERATURE: 98 F | SYSTOLIC BLOOD PRESSURE: 120 MMHG | DIASTOLIC BLOOD PRESSURE: 69 MMHG | HEART RATE: 76 BPM

## 2025-04-01 VITALS
WEIGHT: 262 LBS | SYSTOLIC BLOOD PRESSURE: 122 MMHG | HEART RATE: 74 BPM | TEMPERATURE: 98.1 F | DIASTOLIC BLOOD PRESSURE: 64 MMHG | OXYGEN SATURATION: 94 % | BODY MASS INDEX: 41.12 KG/M2 | HEIGHT: 67 IN

## 2025-04-01 DIAGNOSIS — I82.220 THROMBOSIS OF INFERIOR VENA CAVA (HCC): Primary | ICD-10-CM

## 2025-04-01 DIAGNOSIS — I82.423 THROMBOSIS OF BOTH ILIAC VEINS (HCC): ICD-10-CM

## 2025-04-01 DIAGNOSIS — I87.331 IDIOPATHIC CHRONIC VENOUS HYPERTENSION OF RIGHT LOWER EXTREMITY WITH ULCER AND INFLAMMATION: ICD-10-CM

## 2025-04-01 PROCEDURE — 29581 APPL MULTLAYER CMPRN SYS LEG: CPT

## 2025-04-01 PROCEDURE — 99203 OFFICE O/P NEW LOW 30 MIN: CPT | Performed by: SURGERY

## 2025-04-01 ASSESSMENT — PAIN DESCRIPTION - FREQUENCY: FREQUENCY: CONTINUOUS

## 2025-04-01 ASSESSMENT — PAIN DESCRIPTION - PAIN TYPE: TYPE: CHRONIC PAIN

## 2025-04-01 ASSESSMENT — PAIN DESCRIPTION - DESCRIPTORS: DESCRIPTORS: BURNING

## 2025-04-01 ASSESSMENT — PAIN DESCRIPTION - ORIENTATION: ORIENTATION: RIGHT

## 2025-04-01 ASSESSMENT — PAIN DESCRIPTION - LOCATION: LOCATION: LEG

## 2025-04-01 ASSESSMENT — PAIN SCALES - GENERAL: PAINLEVEL_OUTOF10: 3

## 2025-04-01 NOTE — PROGRESS NOTES
Multilayer Compression Wrap   (Not Unna) Below the Knee    NAME:  Justin Baeza  YOB: 1965  MEDICAL RECORD NUMBER:  [id number]  DATE:  4/1/2025       Removed old Multilayer wrap if present and washed leg with mild soap/water.   Applied moisturizing agent to dry skin as needed.    Applied primary and secondary dressing as ordered    Applied multilayered dressing below the knee to Right lower leg(s)  (4 Layer Compression Wrap ) .    Instructed patient/caregiver not to remove dressing and to keep it clean and dry.    Instructed patient/caregiver on complications to report to provider, such as pain, numbness in toes, heavy drainage, and slippage of dressing.    Instructed patient on purpose of compression dressing and on activity and exercise recommendations.   Applied per   Guidelines    Electronically signed by Kathy Gomez RN on 4/1/25 at 1:11 PM EDT

## 2025-04-03 DIAGNOSIS — I74.5 THROMBOSIS OF ILIAC ARTERY (HCC): Primary | ICD-10-CM

## 2025-04-05 NOTE — PROGRESS NOTES
[facility] Vascular Surgery  La Nena Page MD, FACS, [facility]  [phone]    OUTPATIENT CONSULTATION    Date of Consultation:  4/1/2025    PCP:  Keily Orozco MD       Chief Complaint: Iliocaval vein thrombosis    History of Present Illness:   We are asked to see this patient in consultation by Dr. 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  This is associated with lower extremity wounds.  He also has diffuse venous collaterals.  No liver, heart or kidney failure.  He has pain and heaviness of both legs.  Compliant with warfarin and compression.  Follows in wound care center.  Last imaging 2018.       Past Medical History:  Past Medical History:   Diagnosis Date    Difficult intravenous access     not bedside candidate for PICC lines both arms no thread, needs IR or tunneled    DVT (deep venous thrombosis) (HCC)     Hx of blood clots     Pain and swelling of right lower leg        Past Surgical History:  Past Surgical History:   Procedure Laterality Date    BALLOON ANGIOPLASTY, ARTERY Right 12/19/2017    at [facility]    DILATATION, ESOPHAGUS      SKIN SPLIT GRAFT Right        Home Medications:   Prior to Admission medications    Medication Sig Start Date End Date Taking? Authorizing Provider   sterile water SOLN 20 mL with meropenem 1 g SOLR 1,000 mg Infuse 1,000 mg intravenously in the morning and 1,000 mg at noon and 1,000 mg in the evening.   Yes Provider, MD Jim   warfarin (COUMADIN) 5 MG tablet Take 7.5 mg (1.5 tablets) every day except take 5 mg (1 tablet) on Mondays and Fridays or as directed by the anticoagulation clinic  Patient taking differently: Take 1 tablet by mouth daily Take 7.5 mg (1.5 tablets) every day except take 5 mg (1 tablet) on Mondays and Fridays or as directed by the anticoagulation clinic 1/24/24  Yes Keily Orozco MD   ibuprofen (ADVIL;MOTRIN) 200 MG tablet Take 1 tablet by mouth every 6 hours as needed for Pain   Yes

## 2025-04-07 ENCOUNTER — HOSPITAL ENCOUNTER (OUTPATIENT)
Dept: WOUND CARE | Age: 60
Discharge: HOME | End: 2025-04-07
Attending: SPECIALIST
Payer: COMMERCIAL

## 2025-04-07 VITALS
RESPIRATION RATE: 16 BRPM | DIASTOLIC BLOOD PRESSURE: 78 MMHG | HEART RATE: 69 BPM | SYSTOLIC BLOOD PRESSURE: 119 MMHG | TEMPERATURE: 96.8 F

## 2025-04-07 DIAGNOSIS — I83.009 VENOUS ULCER (HCC): ICD-10-CM

## 2025-04-07 DIAGNOSIS — I89.0 CHRONIC ACQUIRED LYMPHEDEMA: Primary | ICD-10-CM

## 2025-04-07 DIAGNOSIS — I89.0 LYMPHEDEMA: ICD-10-CM

## 2025-04-07 DIAGNOSIS — Z91.199 NONCOMPLIANCE: ICD-10-CM

## 2025-04-07 DIAGNOSIS — L97.909 VENOUS ULCER (HCC): ICD-10-CM

## 2025-04-07 DIAGNOSIS — I87.331 IDIOPATHIC CHRONIC VENOUS HYPERTENSION OF RIGHT LOWER EXTREMITY WITH ULCER AND INFLAMMATION: ICD-10-CM

## 2025-04-07 PROCEDURE — 11045 DBRDMT SUBQ TISS EACH ADDL: CPT

## 2025-04-07 PROCEDURE — 11042 DBRDMT SUBQ TIS 1ST 20SQCM/<: CPT

## 2025-04-07 PROCEDURE — 11042 DBRDMT SUBQ TIS 1ST 20SQCM/<: CPT | Performed by: SPECIALIST

## 2025-04-07 PROCEDURE — 6370000000 HC RX 637 (ALT 250 FOR IP): Performed by: SPECIALIST

## 2025-04-07 PROCEDURE — 29581 APPL MULTLAYER CMPRN SYS LEG: CPT

## 2025-04-07 PROCEDURE — 11045 DBRDMT SUBQ TISS EACH ADDL: CPT | Performed by: SPECIALIST

## 2025-04-07 RX ORDER — SILVER SULFADIAZINE 10 MG/G
CREAM TOPICAL ONCE
OUTPATIENT
Start: 2025-04-07 | End: 2025-04-07

## 2025-04-07 RX ORDER — CLOBETASOL PROPIONATE 0.5 MG/G
OINTMENT TOPICAL ONCE
OUTPATIENT
Start: 2025-04-07 | End: 2025-04-07

## 2025-04-07 RX ORDER — LIDOCAINE HYDROCHLORIDE 20 MG/ML
JELLY TOPICAL ONCE
OUTPATIENT
Start: 2025-04-07 | End: 2025-04-07

## 2025-04-07 RX ORDER — TRIAMCINOLONE ACETONIDE 1 MG/G
OINTMENT TOPICAL ONCE
OUTPATIENT
Start: 2025-04-07 | End: 2025-04-07

## 2025-04-07 RX ORDER — SODIUM CHLOR/HYPOCHLOROUS ACID 0.033 %
SOLUTION, IRRIGATION IRRIGATION ONCE
OUTPATIENT
Start: 2025-04-07 | End: 2025-04-07

## 2025-04-07 RX ORDER — GENTAMICIN SULFATE 1 MG/G
OINTMENT TOPICAL ONCE
OUTPATIENT
Start: 2025-04-07 | End: 2025-04-07

## 2025-04-07 RX ORDER — BETAMETHASONE DIPROPIONATE 0.05 %
OINTMENT (GRAM) TOPICAL ONCE
OUTPATIENT
Start: 2025-04-07 | End: 2025-04-07

## 2025-04-07 RX ORDER — NEOMYCIN/BACITRACIN/POLYMYXINB 3.5-400-5K
OINTMENT (GRAM) TOPICAL ONCE
OUTPATIENT
Start: 2025-04-07 | End: 2025-04-07

## 2025-04-07 RX ORDER — GINSENG 100 MG
CAPSULE ORAL ONCE
OUTPATIENT
Start: 2025-04-07 | End: 2025-04-07

## 2025-04-07 RX ORDER — LIDOCAINE HYDROCHLORIDE 40 MG/ML
SOLUTION TOPICAL ONCE
Status: COMPLETED | OUTPATIENT
Start: 2025-04-07 | End: 2025-04-07

## 2025-04-07 RX ORDER — LIDOCAINE HYDROCHLORIDE 40 MG/ML
SOLUTION TOPICAL ONCE
OUTPATIENT
Start: 2025-04-07 | End: 2025-04-07

## 2025-04-07 RX ORDER — MUPIROCIN 20 MG/G
OINTMENT TOPICAL ONCE
OUTPATIENT
Start: 2025-04-07 | End: 2025-04-07

## 2025-04-07 RX ORDER — BACITRACIN ZINC AND POLYMYXIN B SULFATE 500; 1000 [USP'U]/G; [USP'U]/G
OINTMENT TOPICAL ONCE
OUTPATIENT
Start: 2025-04-07 | End: 2025-04-07

## 2025-04-07 RX ORDER — LIDOCAINE 40 MG/G
CREAM TOPICAL ONCE
OUTPATIENT
Start: 2025-04-07 | End: 2025-04-07

## 2025-04-07 RX ORDER — LIDOCAINE 50 MG/G
OINTMENT TOPICAL ONCE
OUTPATIENT
Start: 2025-04-07 | End: 2025-04-07

## 2025-04-07 RX ADMIN — LIDOCAINE HYDROCHLORIDE: 40 SOLUTION TOPICAL at 08:43

## 2025-04-07 ASSESSMENT — PAIN DESCRIPTION - PAIN TYPE: TYPE: CHRONIC PAIN

## 2025-04-07 ASSESSMENT — PAIN DESCRIPTION - FREQUENCY: FREQUENCY: CONTINUOUS

## 2025-04-07 ASSESSMENT — PAIN DESCRIPTION - DESCRIPTORS: DESCRIPTORS: ACHING

## 2025-04-07 ASSESSMENT — PAIN DESCRIPTION - ORIENTATION: ORIENTATION: RIGHT

## 2025-04-07 ASSESSMENT — PAIN SCALES - GENERAL: PAINLEVEL_OUTOF10: 3

## 2025-04-07 ASSESSMENT — PAIN DESCRIPTION - LOCATION: LOCATION: LEG

## 2025-04-07 NOTE — PATIENT INSTRUCTIONS
holidays.    Please give us 24-48 business hours to return your call.  These hours of operation are subject to change. If you need help with your wounds and cannot wait until we are available, contact your PCP or go to your preferred emergency room.     Call your doctor now or seek immediate medical care if:    You have symptoms of infection, such as:  Increased pain, swelling, warmth, or redness.  Red streaks leading from the area.  Pus draining from the area.  A fever.

## 2025-04-07 NOTE — PROGRESS NOTES
Multilayer Compression Wrap   (Not Unna) Below the Knee    NAME:  Justin Baeza  YOB: 1965  MEDICAL RECORD NUMBER:  [id number]  DATE:  4/7/2025       Removed old Multilayer wrap if present and washed leg with mild soap/water.   Applied moisturizing agent to dry skin as needed.    Applied primary and secondary dressing as ordered    Applied multilayered dressing below the knee to Right lower leg(s)  (4 Layer Compression Wrap ) .    Instructed patient/caregiver not to remove dressing and to keep it clean and dry.    Instructed patient/caregiver on complications to report to provider, such as pain, numbness in toes, heavy drainage, and slippage of dressing.    Instructed patient on purpose of compression dressing and on activity and exercise recommendations.   Applied per   Guidelines    Electronically signed by Kathy Gomez RN on 4/7/25 at 9:33 AM EDT

## 2025-04-07 NOTE — PROGRESS NOTES
[facility]  Progress Note and Procedure Note      Justin Baeza  MEDICAL RECORD NUMBER:  [id number]  AGE: 59 y.o.   GENDER: male  : 1965  EPISODE DATE:  2025    Subjective:     Chief Complaint   Patient presents with    Wound Check       RLE         HISTORY of PRESENT ILLNESS HPI     Justin Baeza is a 59 y.o. male who presents today for wound/ulcer evaluation.   History of Wound Context: Patient continues follow-up for chronic venous insufficiency with ulceration and lymphedema right lower extremity. He had now been off his IV antibiotics for several weeks but is still continuing to take Augmentin.  Patient continues to be on his feet extended periods of time during the day. Little utilization of lymphedema pumps. Once again we have been using a PolyMem with silver primary dressing.  Patient describes less drainage and discomfort since initiation of IV antibiotics per Dr. Agustin for Pseudomonas infection.  Patient has now seen Dr. Page from vascular surgery who agrees to proceed with a CT to evaluate his chronic venous obstruction  Wound/Ulcer Pain Timing/Severity: none  Quality of pain: N/A  Severity:  0 / 10   Modifying Factors: None  Associated Signs/Symptoms: drainage    Ulcer Identification:  Ulcer Type: venous    Contributing Factors: edema, venous stasis, lymphedema, obesity, and anticoagulation therapy    Acute Wound: N/A not an acute wound    PAST MEDICAL HISTORY        Diagnosis Date    Difficult intravenous access     not bedside candidate for PICC lines both arms no thread, needs IR or tunneled    DVT (deep venous thrombosis) (HCC)     Hx of blood clots     Pain and swelling of right lower leg        PAST SURGICAL HISTORY    Past Surgical History:   Procedure Laterality Date    BALLOON ANGIOPLASTY, ARTERY Right 2017    at [facility]    DILATATION, ESOPHAGUS      SKIN SPLIT GRAFT Right        FAMILY HISTORY    Family History   Problem Relation Age of

## 2025-04-10 ENCOUNTER — HOSPITAL ENCOUNTER (OUTPATIENT)
Dept: WOUND CARE | Age: 60
Discharge: HOME | End: 2025-04-10
Attending: SPECIALIST
Payer: COMMERCIAL

## 2025-04-10 VITALS
HEART RATE: 66 BPM | SYSTOLIC BLOOD PRESSURE: 136 MMHG | RESPIRATION RATE: 16 BRPM | TEMPERATURE: 96.4 F | DIASTOLIC BLOOD PRESSURE: 72 MMHG

## 2025-04-10 PROCEDURE — 29581 APPL MULTLAYER CMPRN SYS LEG: CPT

## 2025-04-10 ASSESSMENT — PAIN DESCRIPTION - ORIENTATION: ORIENTATION: RIGHT

## 2025-04-10 ASSESSMENT — PAIN DESCRIPTION - FREQUENCY: FREQUENCY: CONTINUOUS

## 2025-04-10 ASSESSMENT — PAIN DESCRIPTION - PAIN TYPE: TYPE: CHRONIC PAIN

## 2025-04-10 ASSESSMENT — PAIN DESCRIPTION - LOCATION: LOCATION: LEG

## 2025-04-10 ASSESSMENT — PAIN SCALES - GENERAL: PAINLEVEL_OUTOF10: 3

## 2025-04-10 ASSESSMENT — PAIN DESCRIPTION - DESCRIPTORS: DESCRIPTORS: ACHING;BURNING

## 2025-04-10 NOTE — PATIENT INSTRUCTIONS
Wound Care Center Physician Orders and Discharge Instructions  [facility]  [address] [facility]. [address] [place] [id number]  Telephone: [phone]      FAX [phone]    NAME:  Justin Baeza  YOB: 1965  MEDICAL RECORD NUMBER:  [id number]  DATE:  4/10/2025      Wound care:  Continue to follow the instructions and recommendations from your last doctor visit.  The dressing(s) applied is the same as your last visit. Please refer to your last discharge instruction for the information on your wound care.      If there were any changes made, please follow the instructions as written here: none    Future Appointments     Future Appointments   Date Time Provider Department Center   4/14/2025  8:00 AM Khang Giles MD [facility] WOUND [facility]   4/28/2025  9:00 AM [facility] MEDICATION MANAGEMENT [facility]           Your nurse  is:  Yesenia     Electronically signed by Kathy Gomez RN on 4/10/2025 at 8:56 AM     Wound Care Center Information: Should you experience any significant changes in your wound(s) or have questions about your wound care, please contact the [facility] at [phone]. Our hours vary so please leave a message. Please give us 24-48 hours to return your call.   If you need help with your wounds and cannot wait until we are available, contact your PCP or go to the hospital emergency room.       Physician orders by:  Dr. Giles        The information contained in the After Visit Summary has been reviewed with me, the patient and/or responsible adult, by my health care provider(s).  I had the opportunity to ask questions regarding this information.  I have elected to receive;      [] Patient unable to sign Discharge Instructions. Given to ECF/Transportation/POA

## 2025-04-14 ENCOUNTER — HOSPITAL ENCOUNTER (OUTPATIENT)
Dept: WOUND CARE | Age: 60
Discharge: HOME | End: 2025-04-14
Attending: SPECIALIST
Payer: COMMERCIAL

## 2025-04-14 VITALS
RESPIRATION RATE: 16 BRPM | SYSTOLIC BLOOD PRESSURE: 133 MMHG | DIASTOLIC BLOOD PRESSURE: 66 MMHG | HEART RATE: 68 BPM | TEMPERATURE: 96 F

## 2025-04-14 PROCEDURE — 29581 APPL MULTLAYER CMPRN SYS LEG: CPT

## 2025-04-14 RX ORDER — LIDOCAINE 40 MG/G
CREAM TOPICAL ONCE
OUTPATIENT
Start: 2025-04-14 | End: 2025-04-14

## 2025-04-14 RX ORDER — CLOBETASOL PROPIONATE 0.5 MG/G
OINTMENT TOPICAL ONCE
OUTPATIENT
Start: 2025-04-14 | End: 2025-04-14

## 2025-04-14 RX ORDER — GINSENG 100 MG
CAPSULE ORAL ONCE
OUTPATIENT
Start: 2025-04-14 | End: 2025-04-14

## 2025-04-14 RX ORDER — BETAMETHASONE DIPROPIONATE 0.05 %
OINTMENT (GRAM) TOPICAL ONCE
OUTPATIENT
Start: 2025-04-14 | End: 2025-04-14

## 2025-04-14 RX ORDER — MUPIROCIN 20 MG/G
OINTMENT TOPICAL ONCE
OUTPATIENT
Start: 2025-04-14 | End: 2025-04-14

## 2025-04-14 RX ORDER — SODIUM CHLOR/HYPOCHLOROUS ACID 0.033 %
SOLUTION, IRRIGATION IRRIGATION ONCE
OUTPATIENT
Start: 2025-04-14 | End: 2025-04-14

## 2025-04-14 RX ORDER — NEOMYCIN/BACITRACIN/POLYMYXINB 3.5-400-5K
OINTMENT (GRAM) TOPICAL ONCE
OUTPATIENT
Start: 2025-04-14 | End: 2025-04-14

## 2025-04-14 RX ORDER — TRIAMCINOLONE ACETONIDE 1 MG/G
OINTMENT TOPICAL ONCE
OUTPATIENT
Start: 2025-04-14 | End: 2025-04-14

## 2025-04-14 RX ORDER — SILVER SULFADIAZINE 10 MG/G
CREAM TOPICAL ONCE
OUTPATIENT
Start: 2025-04-14 | End: 2025-04-14

## 2025-04-14 RX ORDER — LIDOCAINE HYDROCHLORIDE 20 MG/ML
JELLY TOPICAL ONCE
OUTPATIENT
Start: 2025-04-14 | End: 2025-04-14

## 2025-04-14 RX ORDER — GENTAMICIN SULFATE 1 MG/G
OINTMENT TOPICAL ONCE
OUTPATIENT
Start: 2025-04-14 | End: 2025-04-14

## 2025-04-14 RX ORDER — LIDOCAINE 50 MG/G
OINTMENT TOPICAL ONCE
OUTPATIENT
Start: 2025-04-14 | End: 2025-04-14

## 2025-04-14 RX ORDER — LIDOCAINE HYDROCHLORIDE 40 MG/ML
SOLUTION TOPICAL ONCE
OUTPATIENT
Start: 2025-04-14 | End: 2025-04-14

## 2025-04-14 RX ORDER — BACITRACIN ZINC AND POLYMYXIN B SULFATE 500; 1000 [USP'U]/G; [USP'U]/G
OINTMENT TOPICAL ONCE
OUTPATIENT
Start: 2025-04-14 | End: 2025-04-14

## 2025-04-14 ASSESSMENT — PAIN SCALES - GENERAL: PAINLEVEL_OUTOF10: 3

## 2025-04-14 NOTE — PROGRESS NOTES
Multilayer Compression Wrap   (Not Unna) Below the Knee    NAME:  Justin Baeza  YOB: 1965  MEDICAL RECORD NUMBER:  [id number]  DATE:  4/14/2025       Removed old Multilayer wrap if present and washed leg with mild soap/water.   Applied moisturizing agent to dry skin as needed.    Applied primary and secondary dressing as ordered    Applied multilayered dressing below the knee to Right lower leg(s)  (4 Layer Compression Wrap ) .    Instructed patient/caregiver not to remove dressing and to keep it clean and dry.    Instructed patient/caregiver on complications to report to provider, such as pain, numbness in toes, heavy drainage, and slippage of dressing.    Instructed patient on purpose of compression dressing and on activity and exercise recommendations.   Applied per   Guidelines    Electronically signed by Kathy Gomez RN on 4/14/25 at 8:43 AM EDT

## 2025-04-21 ENCOUNTER — HOSPITAL ENCOUNTER (OUTPATIENT)
Dept: WOUND CARE | Age: 60
Discharge: HOME | End: 2025-04-21
Attending: SPECIALIST
Payer: COMMERCIAL

## 2025-04-21 VITALS
HEART RATE: 67 BPM | TEMPERATURE: 97.6 F | RESPIRATION RATE: 16 BRPM | DIASTOLIC BLOOD PRESSURE: 80 MMHG | SYSTOLIC BLOOD PRESSURE: 137 MMHG

## 2025-04-21 DIAGNOSIS — Z91.199 NONCOMPLIANCE: ICD-10-CM

## 2025-04-21 DIAGNOSIS — I89.0 LYMPHEDEMA: ICD-10-CM

## 2025-04-21 DIAGNOSIS — I83.009 VENOUS ULCER (HCC): ICD-10-CM

## 2025-04-21 DIAGNOSIS — I89.0 CHRONIC ACQUIRED LYMPHEDEMA: Primary | ICD-10-CM

## 2025-04-21 DIAGNOSIS — L97.909 VENOUS ULCER (HCC): ICD-10-CM

## 2025-04-21 DIAGNOSIS — I87.331 IDIOPATHIC CHRONIC VENOUS HYPERTENSION OF RIGHT LOWER EXTREMITY WITH ULCER AND INFLAMMATION (HCC): ICD-10-CM

## 2025-04-21 PROCEDURE — 11045 DBRDMT SUBQ TISS EACH ADDL: CPT | Performed by: SPECIALIST

## 2025-04-21 PROCEDURE — 29581 APPL MULTLAYER CMPRN SYS LEG: CPT

## 2025-04-21 PROCEDURE — 6370000000 HC RX 637 (ALT 250 FOR IP): Performed by: SPECIALIST

## 2025-04-21 PROCEDURE — 11042 DBRDMT SUBQ TIS 1ST 20SQCM/<: CPT

## 2025-04-21 PROCEDURE — 11042 DBRDMT SUBQ TIS 1ST 20SQCM/<: CPT | Performed by: SPECIALIST

## 2025-04-21 PROCEDURE — 11045 DBRDMT SUBQ TISS EACH ADDL: CPT

## 2025-04-21 RX ORDER — BACITRACIN ZINC AND POLYMYXIN B SULFATE 500; 1000 [USP'U]/G; [USP'U]/G
OINTMENT TOPICAL ONCE
OUTPATIENT
Start: 2025-04-21 | End: 2025-04-21

## 2025-04-21 RX ORDER — LIDOCAINE HYDROCHLORIDE 20 MG/ML
JELLY TOPICAL ONCE
OUTPATIENT
Start: 2025-04-21 | End: 2025-04-21

## 2025-04-21 RX ORDER — LIDOCAINE 50 MG/G
OINTMENT TOPICAL ONCE
OUTPATIENT
Start: 2025-04-21 | End: 2025-04-21

## 2025-04-21 RX ORDER — LIDOCAINE HYDROCHLORIDE 40 MG/ML
SOLUTION TOPICAL ONCE
OUTPATIENT
Start: 2025-04-21 | End: 2025-04-21

## 2025-04-21 RX ORDER — TRIAMCINOLONE ACETONIDE 1 MG/G
OINTMENT TOPICAL ONCE
OUTPATIENT
Start: 2025-04-21 | End: 2025-04-21

## 2025-04-21 RX ORDER — BETAMETHASONE DIPROPIONATE 0.05 %
OINTMENT (GRAM) TOPICAL ONCE
OUTPATIENT
Start: 2025-04-21 | End: 2025-04-21

## 2025-04-21 RX ORDER — MUPIROCIN 20 MG/G
OINTMENT TOPICAL ONCE
OUTPATIENT
Start: 2025-04-21 | End: 2025-04-21

## 2025-04-21 RX ORDER — NEOMYCIN/BACITRACIN/POLYMYXINB 3.5-400-5K
OINTMENT (GRAM) TOPICAL ONCE
OUTPATIENT
Start: 2025-04-21 | End: 2025-04-21

## 2025-04-21 RX ORDER — GINSENG 100 MG
CAPSULE ORAL ONCE
OUTPATIENT
Start: 2025-04-21 | End: 2025-04-21

## 2025-04-21 RX ORDER — LIDOCAINE 40 MG/G
CREAM TOPICAL ONCE
OUTPATIENT
Start: 2025-04-21 | End: 2025-04-21

## 2025-04-21 RX ORDER — CLOBETASOL PROPIONATE 0.5 MG/G
OINTMENT TOPICAL ONCE
OUTPATIENT
Start: 2025-04-21 | End: 2025-04-21

## 2025-04-21 RX ORDER — GENTAMICIN SULFATE 1 MG/G
OINTMENT TOPICAL ONCE
OUTPATIENT
Start: 2025-04-21 | End: 2025-04-21

## 2025-04-21 RX ORDER — LIDOCAINE HYDROCHLORIDE 40 MG/ML
SOLUTION TOPICAL ONCE
Status: COMPLETED | OUTPATIENT
Start: 2025-04-21 | End: 2025-04-21

## 2025-04-21 RX ORDER — SILVER SULFADIAZINE 10 MG/G
CREAM TOPICAL ONCE
OUTPATIENT
Start: 2025-04-21 | End: 2025-04-21

## 2025-04-21 RX ORDER — SODIUM CHLOR/HYPOCHLOROUS ACID 0.033 %
SOLUTION, IRRIGATION IRRIGATION ONCE
OUTPATIENT
Start: 2025-04-21 | End: 2025-04-21

## 2025-04-21 RX ADMIN — LIDOCAINE HYDROCHLORIDE: 40 SOLUTION TOPICAL at 08:17

## 2025-04-21 ASSESSMENT — PAIN DESCRIPTION - FREQUENCY: FREQUENCY: CONTINUOUS

## 2025-04-21 ASSESSMENT — PAIN SCALES - GENERAL: PAINLEVEL_OUTOF10: 4

## 2025-04-21 ASSESSMENT — PAIN DESCRIPTION - LOCATION: LOCATION: LEG

## 2025-04-21 ASSESSMENT — PAIN DESCRIPTION - ORIENTATION: ORIENTATION: RIGHT

## 2025-04-21 ASSESSMENT — PAIN DESCRIPTION - PAIN TYPE: TYPE: CHRONIC PAIN

## 2025-04-21 NOTE — PROGRESS NOTES
[facility]  Progress Note and Procedure Note      Justin Baeza  MEDICAL RECORD NUMBER:  [id number]  AGE: 59 y.o.   GENDER: male  : 1965  EPISODE DATE:  2025    Subjective:     Chief Complaint   Patient presents with    Wound Check         RLE         HISTORY of PRESENT ILLNESS HPI     Justin Baeza is a 59 y.o. male who presents today for wound/ulcer evaluation.   History of Wound Context: Patient continues follow-up for chronic venous insufficiency with ulceration and lymphedema right lower extremity. He had now been off his IV antibiotics for several weeks but is still continuing to take Augmentin.  Patient continues to be on his feet extended periods of time during the day. Little utilization of lymphedema pumps. Once again we have been using a PolyMem with silver primary dressing.  Patient has now seen Dr. Page from vascular surgery who agrees to proceed with a CTV to evaluate his chronic venous obstruction   Wound/Ulcer Pain Timing/Severity: none  Quality of pain: N/A  Severity:  0 / 10   Modifying Factors: None  Associated Signs/Symptoms: drainage    Ulcer Identification:  Ulcer Type: venous    Contributing Factors: edema, venous stasis, lymphedema, obesity, and anticoagulation therapy    Acute Wound: N/A not an acute wound    PAST MEDICAL HISTORY        Diagnosis Date    Difficult intravenous access     not bedside candidate for PICC lines both arms no thread, needs IR or tunneled    DVT (deep venous thrombosis) (HCC)     Hx of blood clots     Pain and swelling of right lower leg        PAST SURGICAL HISTORY    Past Surgical History:   Procedure Laterality Date    BALLOON ANGIOPLASTY, ARTERY Right 2017    at [facility]    DILATATION, ESOPHAGUS      SKIN SPLIT GRAFT Right        FAMILY HISTORY    Family History   Problem Relation Age of Onset    Diabetes Mother     Heart Attack Father        SOCIAL HISTORY    Social History     Tobacco Use    Smoking status:

## 2025-04-21 NOTE — PATIENT INSTRUCTIONS
Wound Care Center Physician Orders and Discharge Instructions  [facility]   [address]  Telephone: [phone] FAX [phone]  NAME:  Justin Baeza  YOB: 1965  MEDICAL RECORD NUMBER:  [id number]  DATE: 4/21/2025      Return Appointment:  Return Appointment: With Khang Giles MD  in 1 Week(s)  [x] Return Appointment for a Wound Assessment with the nurse on:     Future Appointments   Date Time Provider Department Center   4/28/2025  9:00 AM [facility] MEDICATION MANAGEMENT [facility]   4/30/2025  9:30 AM [facility] CT RM [address] [facility] CT [facility] Radio               Orders Placed This Encounter   Procedures    Initiate Outpatient Wound Care Protocol       Home Care Company: none    Medically necessary services for evaluation and treatment: []Skilled Nursing (using clean technique) []PT (Eval & Treat) []OT (Eval & Treat) []Social Work []Dietician []Other:      Wound care instructions:  If you smoke we ask that you refrain from smoking. Smoking inhibits wounds from healing.  When taking antibiotics take the entire prescription as ordered. Do not stop taking until medication is all gone unless otherwise instructed.   Exercise as tolerated.   Keep weight off wounds and reposition every 2 hours if applicable.  Do not get wounds wet in the bath or shower unless otherwise instructed by your physician. If your wound is on your foot or leg, you may purchase a cast bag. Please ask at the pharmacy.  Wash hands with soap and water prior to and after every dressing change.    [x]Wash wounds with: No need to wash. Leave dressing in place.    [x]Kiah wound Topical Treatments: Do not apply lotions, creams, or ointments to the skin around the wound bed unless directed as followed:   Apply around the wound:  zinc paste        [x]Wound Location: right lower leg wounds  Apply Primary Dressing to wound: polymem AG  Secondary Dressing: Extra absorbant pad   Avoid contact of

## 2025-04-21 NOTE — PROGRESS NOTES
Multilayer Compression Wrap   (Not Unna) Below the Knee    NAME:  Justin Baeza  YOB: 1965  MEDICAL RECORD NUMBER:  [id number]  DATE:  4/21/2025       Removed old Multilayer wrap if present and washed leg with mild soap/water.   Applied moisturizing agent to dry skin as needed.    Applied primary and secondary dressing as ordered    Applied multilayered dressing below the knee to Right lower leg(s)  (4 Layer Compression Wrap ) .    Instructed patient/caregiver not to remove dressing and to keep it clean and dry.    Instructed patient/caregiver on complications to report to provider, such as pain, numbness in toes, heavy drainage, and slippage of dressing.    Instructed patient on purpose of compression dressing and on activity and exercise recommendations.   Applied per   Guidelines    Electronically signed by Edgar Nunez RN on 4/21/25 at 8:50 AM EDT

## 2025-04-25 NOTE — PROGRESS NOTES
[facility]  Progress Note and Procedure Note      Keaton Washburn  MEDICAL RECORD NUMBER:  [id number]  AGE: 46 y.o. GENDER: male  : 1965  EPISODE DATE:  3/23/2018    Subjective:     Chief Complaint   Patient presents with    Wound Check     f/u right lower leg wound         HISTORY of PRESENT ILLNESS HPI     Keaton Washburn is a 46 y.o. male who presents today for wound/ulcer evaluation. 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 Underwent unsuccessfull dilatation of IVC at [facility][note 3] with persistent massive wounds of RLE. Pain Assessment:  Wound/Ulcer Pain Timing/Severity: intermittent  Quality of pain: sharp  Severity:   / 10   Modifying Factors: None  Associated Signs/Symptoms: edema, drainage and odor    Ulcer Identification:  Ulcer Type: venous  Contributing Factors: edema, diabetes, smoking and anticoagulation therapy    Objective:      BP (!) 149/86   Pulse 88   Temp 98.2 °F (36.8 °C) (Oral)   Resp 18     Wt Readings from Last 3 Encounters:   17 243 lb 6.4 oz (110.4 kg)   17 247 lb (112 kg)   17 239 lb (108.4 kg)       PHYSICAL EXAM    Extremities: no cyanosis and no clubbing with 3+ phlebolymphedema and exudative wounds See measurements below    Assessment:      No diagnosis found. Procedure Note  Indications:  Based on my examination of this patient's wound(s) today, sharp excision is required to promote healing and evaluate the extent healing. Performed by: Yaron Barrera MD    Consent obtained? Yes    Time out taken: Yes    Pain Control: Anesthetic: 4% Lidocaine Liquid Topical     Debridement:Excisional Debridement    Using curette the wound was sharply debrided    down through and including the removal of  epidermis, dermis and subcutaneous tissue.     Devitalized Tissue Debrided:  fibrin, biofilm and slough      Pre Debridement Measurements:  Are located in the Wound 3/19/2018  9:19 AM   Dressing/Treatment Alginate;ABD 2/5/2018 10:57 AM   Wound Cleansed Rinsed/Irrigated with saline 3/23/2018  8:05 AM   Wound Length (cm) 14.6 cm 3/23/2018  8:05 AM   Wound Width (cm) 19.8 cm 3/23/2018  8:05 AM   Wound Depth (cm)  0.2 3/23/2018  8:05 AM   Calculated Wound Size (cm^2) (l*w) 289.08 cm^2 3/23/2018  8:05 AM   Change in Wound Size % (l*w) -608.53 3/23/2018  8:05 AM   Distance Tunneling (cm) 0 cm 3/16/2018 10:06 AM   Tunneling Position ___ O'Clock 0 3/9/2018  9:33 AM   Undermining Starts ___ O'Clock 0 12/1/2017 10:07 AM   Undermining Ends___ O'Clock 0 3/9/2018  9:33 AM   Undermining Maxium Distance (cm) 0 3/16/2018 10:06 AM   Wound Assessment Yellow;Red;Slough; Hyper granulation tissue 3/23/2018  8:05 AM   Drainage Amount Copious 3/23/2018  8:05 AM   Drainage Description Yellow 3/23/2018  8:05 AM   Odor None 3/23/2018  8:05 AM   Margins Defined edges 3/23/2018  8:05 AM   Kiah-wound Assessment Excoriated;Fragile;Pink 3/23/2018  8:05 AM   Non-staged Wound Description Full thickness 3/23/2018  8:05 AM   Mansura%Wound Bed 40 3/23/2018  8:05 AM   Red%Wound Bed 0 3/23/2018  8:05 AM   Yellow%Wound Bed 60 3/23/2018  8:05 AM   Black%Wound Bed 0 3/23/2018  8:05 AM   Purple%Wound Bed 0 1/19/2018  8:59 AM   Other%Wound Bed 30 % light green 3/12/2018 10:05 AM   Number of days: 381       Percent of Wound Debrided: 20%    Total Surface Area Debrided:  88 sq cm    Diabetic/Pressure/Non Pressure Ulcers only:  Ulcer: Venous Ulcer    Bleeding: Minimal    Hemostasis Achieved: by pressure    Procedural Pain: 0  / 10     Post Procedural Pain: 0 / 10     Response to treatment:  With complaints of pain. I have seen the patient with Dr. Toby Salgado who agrees to consider STSG if patient is willing to take off work to elevate extremity. This has always been a problem for the patient. The wounds themselves are the best they have been with addition of IV antibiotics.         Plan:     Treatment Note: Please see attached Discharge Instructions. These instructions were given and singed by the patient or POA    New Medication(s) at this visit:   New Prescriptions    No medications on file       In my professional opinion this patient would benefit from HBO Therapy: No       Patient is to return to wound care center in:  1 week(s)    Discharge [facility] Physician Orders and Discharge Instructions  [facility]  [address] [address] [place] [address]  Telephone: [phone]    NAME:  Jann Cottrell  YOB: 1965  MEDICAL RECORD NUMBER:  [id number]  DATE:  3/23/2018    Wash hands with soap and water prior to and after every dressing change. Wound Cleansing:   · Do not scrub or use excessive force. · With each dressing change, rinse wounds with 0.9% Saline. (May use wound wash or soft contact solution. Both can be purchased at a local drug store). · If unable to obtain saline, may use a gentle soap and water. · Keep wounds dry in the shower unless otherwise instructed by the physician. Topical Treatments:  Do not apply lotions, creams, or ointments to wound bed unless directed. [] Apply moisturizing lotion to skin surrounding the wound prior to dressing change.  [] Apply antifungal ointment to skin surrounding the wound prior to dressing change.  [] Apply thin film of moisture barrier ointment to skin immediately around wound.   [] Other:       Dressings:           Wound Location: right lower leg wound     [x] Apply Primary Dressing to wound:       [] Foam/Foam with Border(i.e Mepilex) [] Non-adherent (i.e.Mepitel)   [] Alginate with Silver(i.e. Silvercel) [] Alginate   [] Collagen(i.e. Puracol) [] Collagen with Silver(i.e. Candice)   [] Hydrocolloid  [] Hydrafera Blue moistened with saline   [] MediHoney Paste/Gel [] Hydrogel      [] Santyl covered with gauze moisten with saline    [] Bactroban/Mupirocin [] Polysporin  [x] Other: Left shoulder pain

## 2025-04-28 ENCOUNTER — HOSPITAL ENCOUNTER (OUTPATIENT)
Dept: WOUND CARE | Age: 60
Discharge: HOME | End: 2025-04-28
Attending: SPECIALIST
Payer: COMMERCIAL

## 2025-04-28 VITALS
HEART RATE: 71 BPM | SYSTOLIC BLOOD PRESSURE: 138 MMHG | DIASTOLIC BLOOD PRESSURE: 70 MMHG | RESPIRATION RATE: 16 BRPM | TEMPERATURE: 97 F

## 2025-04-28 DIAGNOSIS — I83.009 VENOUS ULCER (HCC): ICD-10-CM

## 2025-04-28 DIAGNOSIS — L97.909 VENOUS ULCER (HCC): ICD-10-CM

## 2025-04-28 DIAGNOSIS — Z91.199 NONCOMPLIANCE: ICD-10-CM

## 2025-04-28 DIAGNOSIS — I89.0 CHRONIC ACQUIRED LYMPHEDEMA: Primary | ICD-10-CM

## 2025-04-28 DIAGNOSIS — I89.0 LYMPHEDEMA: ICD-10-CM

## 2025-04-28 DIAGNOSIS — I87.331 IDIOPATHIC CHRONIC VENOUS HYPERTENSION OF RIGHT LOWER EXTREMITY WITH ULCER AND INFLAMMATION (HCC): ICD-10-CM

## 2025-04-28 PROCEDURE — 11042 DBRDMT SUBQ TIS 1ST 20SQCM/<: CPT

## 2025-04-28 PROCEDURE — 11042 DBRDMT SUBQ TIS 1ST 20SQCM/<: CPT | Performed by: SPECIALIST

## 2025-04-28 PROCEDURE — 29581 APPL MULTLAYER CMPRN SYS LEG: CPT

## 2025-04-28 PROCEDURE — 11045 DBRDMT SUBQ TISS EACH ADDL: CPT

## 2025-04-28 PROCEDURE — 6370000000 HC RX 637 (ALT 250 FOR IP): Performed by: SPECIALIST

## 2025-04-28 PROCEDURE — 11045 DBRDMT SUBQ TISS EACH ADDL: CPT | Performed by: SPECIALIST

## 2025-04-28 RX ORDER — LIDOCAINE 50 MG/G
OINTMENT TOPICAL ONCE
OUTPATIENT
Start: 2025-04-28 | End: 2025-04-28

## 2025-04-28 RX ORDER — GENTAMICIN SULFATE 1 MG/G
OINTMENT TOPICAL ONCE
OUTPATIENT
Start: 2025-04-28 | End: 2025-04-28

## 2025-04-28 RX ORDER — TRIAMCINOLONE ACETONIDE 1 MG/G
OINTMENT TOPICAL ONCE
OUTPATIENT
Start: 2025-04-28 | End: 2025-04-28

## 2025-04-28 RX ORDER — SODIUM CHLOR/HYPOCHLOROUS ACID 0.033 %
SOLUTION, IRRIGATION IRRIGATION ONCE
OUTPATIENT
Start: 2025-04-28 | End: 2025-04-28

## 2025-04-28 RX ORDER — LIDOCAINE HYDROCHLORIDE 20 MG/ML
JELLY TOPICAL ONCE
OUTPATIENT
Start: 2025-04-28 | End: 2025-04-28

## 2025-04-28 RX ORDER — SILVER SULFADIAZINE 10 MG/G
CREAM TOPICAL ONCE
OUTPATIENT
Start: 2025-04-28 | End: 2025-04-28

## 2025-04-28 RX ORDER — BETAMETHASONE DIPROPIONATE 0.05 %
OINTMENT (GRAM) TOPICAL ONCE
OUTPATIENT
Start: 2025-04-28 | End: 2025-04-28

## 2025-04-28 RX ORDER — NEOMYCIN/BACITRACIN/POLYMYXINB 3.5-400-5K
OINTMENT (GRAM) TOPICAL ONCE
OUTPATIENT
Start: 2025-04-28 | End: 2025-04-28

## 2025-04-28 RX ORDER — CLOBETASOL PROPIONATE 0.5 MG/G
OINTMENT TOPICAL ONCE
OUTPATIENT
Start: 2025-04-28 | End: 2025-04-28

## 2025-04-28 RX ORDER — GINSENG 100 MG
CAPSULE ORAL ONCE
OUTPATIENT
Start: 2025-04-28 | End: 2025-04-28

## 2025-04-28 RX ORDER — MUPIROCIN 20 MG/G
OINTMENT TOPICAL ONCE
OUTPATIENT
Start: 2025-04-28 | End: 2025-04-28

## 2025-04-28 RX ORDER — LIDOCAINE HYDROCHLORIDE 40 MG/ML
SOLUTION TOPICAL ONCE
Status: COMPLETED | OUTPATIENT
Start: 2025-04-28 | End: 2025-04-28

## 2025-04-28 RX ORDER — LIDOCAINE 40 MG/G
CREAM TOPICAL ONCE
OUTPATIENT
Start: 2025-04-28 | End: 2025-04-28

## 2025-04-28 RX ORDER — LIDOCAINE HYDROCHLORIDE 40 MG/ML
SOLUTION TOPICAL ONCE
OUTPATIENT
Start: 2025-04-28 | End: 2025-04-28

## 2025-04-28 RX ORDER — BACITRACIN ZINC AND POLYMYXIN B SULFATE 500; 1000 [USP'U]/G; [USP'U]/G
OINTMENT TOPICAL ONCE
OUTPATIENT
Start: 2025-04-28 | End: 2025-04-28

## 2025-04-28 RX ADMIN — LIDOCAINE HYDROCHLORIDE: 40 SOLUTION TOPICAL at 08:19

## 2025-04-28 ASSESSMENT — PAIN DESCRIPTION - ORIENTATION: ORIENTATION: RIGHT

## 2025-04-28 ASSESSMENT — PAIN SCALES - GENERAL: PAINLEVEL_OUTOF10: 2

## 2025-04-28 ASSESSMENT — PAIN DESCRIPTION - LOCATION: LOCATION: LEG

## 2025-04-28 ASSESSMENT — PAIN DESCRIPTION - PAIN TYPE: TYPE: CHRONIC PAIN

## 2025-04-28 ASSESSMENT — PAIN DESCRIPTION - FREQUENCY: FREQUENCY: CONTINUOUS

## 2025-04-28 NOTE — PATIENT INSTRUCTIONS
Wound Care Center Physician Orders and Discharge Instructions  [facility]   [address]  Telephone: [phone] FAX [phone]  NAME:  Justin Baeza  YOB: 1965  MEDICAL RECORD NUMBER:  [id number]  DATE: 4/28/2025      Return Appointment:  Return Appointment: With Khang Giles MD  in 1 Week(s)  [x] Return Appointment for a Wound Assessment with the nurse on:     Future Appointments   Date Time Provider Department Center   4/28/2025  9:00 AM [facility] MEDICATION MANAGEMENT [facility] MM [facility]   4/30/2025  9:30 AM [facility] CT RM 1 [facility] CT [facility] Radio               Orders Placed This Encounter   Procedures    Initiate Outpatient Wound Care Protocol       Home Care Company: none    Medically necessary services for evaluation and treatment: []Skilled Nursing (using clean technique) []PT (Eval & Treat) []OT (Eval & Treat) []Social Work []Dietician []Other:      Wound care instructions:  If you smoke we ask that you refrain from smoking. Smoking inhibits wounds from healing.  When taking antibiotics take the entire prescription as ordered. Do not stop taking until medication is all gone unless otherwise instructed.   Exercise as tolerated.   Keep weight off wounds and reposition every 2 hours if applicable.  Do not get wounds wet in the bath or shower unless otherwise instructed by your physician. If your wound is on your foot or leg, you may purchase a cast bag. Please ask at the pharmacy.  Wash hands with soap and water prior to and after every dressing change.    [x]Wash wounds with: No need to wash. Leave dressing in place.    [x]Kiah wound Topical Treatments: Do not apply lotions, creams, or ointments to the skin around the wound bed unless directed as followed:   Apply around the wound:  zinc paste        [x]Wound Location: right lower leg wounds  Apply Primary Dressing to wound: polymem AG  Secondary Dressing: Extra absorbant pad   Avoid contact of

## 2025-04-28 NOTE — PROGRESS NOTES
Multilayer Compression Wrap   (Not Unna) Below the Knee    NAME:  Justin Baeza  YOB: 1965  MEDICAL RECORD NUMBER:  [id number]  DATE:  4/28/2025       Removed old Multilayer wrap if present and washed leg with mild soap/water.   Applied moisturizing agent to dry skin as needed.    Applied primary and secondary dressing as ordered    Applied multilayered dressing below the knee to Right lower leg(s)  (4 Layer Compression Wrap ) .    Instructed patient/caregiver not to remove dressing and to keep it clean and dry.    Instructed patient/caregiver on complications to report to provider, such as pain, numbness in toes, heavy drainage, and slippage of dressing.    Instructed patient on purpose of compression dressing and on activity and exercise recommendations.   Applied per   Guidelines    Electronically signed by Yesenia Hawkins RN on 4/28/25 at 8:52 AM EDT      
are available, contact your PCP or go to your preferred emergency room.     Call your doctor now or seek immediate medical care if:    You have symptoms of infection, such as:  Increased pain, swelling, warmth, or redness.  Red streaks leading from the area.  Pus draining from the area.  A fever.       Electronically signed by CHICO VELIZ MD on 4/28/2025 at 8:59 AM

## 2025-04-30 ENCOUNTER — RESULTS FOLLOW-UP (OUTPATIENT)
Dept: VASCULAR SURGERY | Age: 60
End: 2025-04-30

## 2025-04-30 ENCOUNTER — HOSPITAL ENCOUNTER (OUTPATIENT)
Dept: CT IMAGING | Age: 60
Discharge: HOME | End: 2025-04-30
Attending: SURGERY
Payer: COMMERCIAL

## 2025-04-30 DIAGNOSIS — I87.331 IDIOPATHIC CHRONIC VENOUS HYPERTENSION OF RIGHT LOWER EXTREMITY WITH ULCER AND INFLAMMATION (HCC): ICD-10-CM

## 2025-04-30 DIAGNOSIS — I82.220 THROMBOSIS OF INFERIOR VENA CAVA (HCC): ICD-10-CM

## 2025-04-30 DIAGNOSIS — I82.423 THROMBOSIS OF BOTH ILIAC VEINS (HCC): ICD-10-CM

## 2025-04-30 PROCEDURE — 6360000004 HC RX CONTRAST MEDICATION: Performed by: SURGERY

## 2025-04-30 PROCEDURE — 74174 CTA ABD&PLVS W/CONTRAST: CPT

## 2025-04-30 RX ORDER — IOPAMIDOL 755 MG/ML
100 INJECTION, SOLUTION INTRAVASCULAR
Status: COMPLETED | OUTPATIENT
Start: 2025-04-30 | End: 2025-04-30

## 2025-04-30 RX ADMIN — IOPAMIDOL 100 ML: 755 INJECTION, SOLUTION INTRAVENOUS at 09:49

## 2025-04-30 NOTE — RESULT ENCOUNTER NOTE
Please call him and let him know that I spoke with Interventional Radiology about his scan.  Not much change since 2018, but they are willing to offer consultation and possible procedure to relieve the venous occlusion.  Can you please place IR consult for Dr. Michael Fernandez. Thank you.

## 2025-05-01 NOTE — TELEPHONE ENCOUNTER
Spoke with CONSUELO Kennedy of radiology at [facility] and referral for Dr. Michael Fernandez was faxed. Order for consultation for possible procedure to relieve the venous occlusion.

## 2025-05-05 ENCOUNTER — TELEPHONE (OUTPATIENT)
Dept: GENERAL RADIOLOGY | Age: 60
End: 2025-05-05

## 2025-05-05 ENCOUNTER — ANTI-COAG VISIT (OUTPATIENT)
Dept: PHARMACY | Age: 60
End: 2025-05-05
Payer: COMMERCIAL

## 2025-05-05 ENCOUNTER — HOSPITAL ENCOUNTER (OUTPATIENT)
Dept: WOUND CARE | Age: 60
Discharge: HOME | End: 2025-05-05
Attending: SPECIALIST
Payer: COMMERCIAL

## 2025-05-05 VITALS
RESPIRATION RATE: 16 BRPM | TEMPERATURE: 97 F | HEART RATE: 73 BPM | DIASTOLIC BLOOD PRESSURE: 83 MMHG | SYSTOLIC BLOOD PRESSURE: 130 MMHG

## 2025-05-05 DIAGNOSIS — L97.909 VENOUS ULCER (HCC): ICD-10-CM

## 2025-05-05 DIAGNOSIS — I89.0 LYMPHEDEMA: ICD-10-CM

## 2025-05-05 DIAGNOSIS — I89.0 CHRONIC ACQUIRED LYMPHEDEMA: Primary | ICD-10-CM

## 2025-05-05 DIAGNOSIS — Z91.199 NONCOMPLIANCE: ICD-10-CM

## 2025-05-05 DIAGNOSIS — I83.009 VENOUS ULCER (HCC): ICD-10-CM

## 2025-05-05 DIAGNOSIS — I87.331 IDIOPATHIC CHRONIC VENOUS HYPERTENSION OF RIGHT LOWER EXTREMITY WITH ULCER AND INFLAMMATION (HCC): ICD-10-CM

## 2025-05-05 DIAGNOSIS — I82.5Z1 CHRONIC VENOUS EMBOLISM AND THROMBOSIS OF DEEP VESSELS OF DISTAL END OF RIGHT LOWER EXTREMITY (HCC): Primary | ICD-10-CM

## 2025-05-05 LAB
INTERNATIONAL NORMALIZATION RATIO, POC: 2.4
PROTHROMBIN TIME, POC: 0

## 2025-05-05 PROCEDURE — 11045 DBRDMT SUBQ TISS EACH ADDL: CPT

## 2025-05-05 PROCEDURE — 85610 PROTHROMBIN TIME: CPT

## 2025-05-05 PROCEDURE — 99211 OFF/OP EST MAY X REQ PHY/QHP: CPT

## 2025-05-05 PROCEDURE — 11042 DBRDMT SUBQ TIS 1ST 20SQCM/<: CPT

## 2025-05-05 PROCEDURE — 11045 DBRDMT SUBQ TISS EACH ADDL: CPT | Performed by: SPECIALIST

## 2025-05-05 PROCEDURE — 11042 DBRDMT SUBQ TIS 1ST 20SQCM/<: CPT | Performed by: SPECIALIST

## 2025-05-05 PROCEDURE — 29581 APPL MULTLAYER CMPRN SYS LEG: CPT

## 2025-05-05 RX ORDER — SODIUM CHLOR/HYPOCHLOROUS ACID 0.033 %
SOLUTION, IRRIGATION IRRIGATION ONCE
OUTPATIENT
Start: 2025-05-05 | End: 2025-05-05

## 2025-05-05 RX ORDER — BETAMETHASONE DIPROPIONATE 0.05 %
OINTMENT (GRAM) TOPICAL ONCE
OUTPATIENT
Start: 2025-05-05 | End: 2025-05-05

## 2025-05-05 RX ORDER — TRIAMCINOLONE ACETONIDE 1 MG/G
OINTMENT TOPICAL ONCE
OUTPATIENT
Start: 2025-05-05 | End: 2025-05-05

## 2025-05-05 RX ORDER — GENTAMICIN SULFATE 1 MG/G
OINTMENT TOPICAL ONCE
OUTPATIENT
Start: 2025-05-05 | End: 2025-05-05

## 2025-05-05 RX ORDER — NEOMYCIN/BACITRACIN/POLYMYXINB 3.5-400-5K
OINTMENT (GRAM) TOPICAL ONCE
OUTPATIENT
Start: 2025-05-05 | End: 2025-05-05

## 2025-05-05 RX ORDER — LIDOCAINE HYDROCHLORIDE 20 MG/ML
JELLY TOPICAL ONCE
OUTPATIENT
Start: 2025-05-05 | End: 2025-05-05

## 2025-05-05 RX ORDER — GINSENG 100 MG
CAPSULE ORAL ONCE
OUTPATIENT
Start: 2025-05-05 | End: 2025-05-05

## 2025-05-05 RX ORDER — SILVER SULFADIAZINE 10 MG/G
CREAM TOPICAL ONCE
OUTPATIENT
Start: 2025-05-05 | End: 2025-05-05

## 2025-05-05 RX ORDER — LIDOCAINE 50 MG/G
OINTMENT TOPICAL ONCE
OUTPATIENT
Start: 2025-05-05 | End: 2025-05-05

## 2025-05-05 RX ORDER — CLOBETASOL PROPIONATE 0.5 MG/G
OINTMENT TOPICAL ONCE
OUTPATIENT
Start: 2025-05-05 | End: 2025-05-05

## 2025-05-05 RX ORDER — LIDOCAINE HYDROCHLORIDE 40 MG/ML
SOLUTION TOPICAL ONCE
OUTPATIENT
Start: 2025-05-05 | End: 2025-05-05

## 2025-05-05 RX ORDER — LIDOCAINE 40 MG/G
CREAM TOPICAL ONCE
OUTPATIENT
Start: 2025-05-05 | End: 2025-05-05

## 2025-05-05 RX ORDER — MUPIROCIN 20 MG/G
OINTMENT TOPICAL ONCE
OUTPATIENT
Start: 2025-05-05 | End: 2025-05-05

## 2025-05-05 RX ORDER — BACITRACIN ZINC AND POLYMYXIN B SULFATE 500; 1000 [USP'U]/G; [USP'U]/G
OINTMENT TOPICAL ONCE
OUTPATIENT
Start: 2025-05-05 | End: 2025-05-05

## 2025-05-05 ASSESSMENT — PAIN DESCRIPTION - ORIENTATION: ORIENTATION: RIGHT

## 2025-05-05 ASSESSMENT — PAIN DESCRIPTION - DESCRIPTORS: DESCRIPTORS: ACHING;BURNING

## 2025-05-05 ASSESSMENT — PAIN DESCRIPTION - PAIN TYPE: TYPE: CHRONIC PAIN

## 2025-05-05 ASSESSMENT — PAIN SCALES - GENERAL: PAINLEVEL_OUTOF10: 3

## 2025-05-05 ASSESSMENT — PAIN DESCRIPTION - ONSET: ONSET: ON-GOING

## 2025-05-05 ASSESSMENT — PAIN DESCRIPTION - FREQUENCY: FREQUENCY: CONTINUOUS

## 2025-05-05 ASSESSMENT — PAIN DESCRIPTION - LOCATION: LOCATION: LEG

## 2025-05-05 NOTE — TELEPHONE ENCOUNTER
Received call from Dr Page's office that patient needs to reschedule consult scheduled with Dr Torres 5/9. Patient will be out of town until last week of May. Discussed with patient that we do not have final schedule for end of May/June at this time but I will call him back when that becomes available to reschedule. Patient understanding of this and with no additional questions at this time.

## 2025-05-05 NOTE — PROGRESS NOTES
ANTICOAGULATION SERVICE    Justin Baeza is a 60 y.o. male with PMHx significant for chronic DVT (last in Jan, 2019) who presents to clinic 5/5/2025 for anticoagulation monitoring and adjustment.  Patient has been taking warfarin for 15+ years.     Anticoagulation Indication(s):  DVT    Referring Physician:  Dr. Orozco    Goal INR Range:  2-3  Duration of Anticoagulation Therapy:  Indefinite  Time of day dose taken:  AM  Product patient has at home:  warfarin 5 mg (peach)    INR Summary                            Warfarin regimen (mg)  Date INR   A/P    Sun Mon Tue Wed Thu Fri Sat Mg/wk  5/5 2.4 At goal,continue     7.5 5 7.5 7.5 7.5 5 7.5 47.5  3/17 2.7 At goal,continue     7.5 5 7.5 7.5 7.5 5 7.5 47.5  3/3 1.8 At goal,continue     7.5 5 7.5 7.5 7.5 5 7.5 47.5  2/24 1.5 Below goal, held    7.5 5 7.5 7.5 7.5 5 7.5 47.5  12/30 2.7 At goal, continue   7.5 5 7.5 7.5 7.5 5 7.5 47.5  11/25 2.8 At goal, continue   7.5 5 7.5 7.5 7.5 5 7.5 47.5  10/18 2.9 At goal, continue   7.5 5 7.5 7.5 7.5 5 7.5 47.5  8/15 2.7 At goal, continue   7.5 5 7.5 7.5 7.5 5 7.5 47.5  7/3 2.4 At goal, continue   7.5 5 7.5 7.5 7.5 5 7.5 47.5  3/14 2.6 At goal, continue   7.5 5 7.5 7.5 7.5 5 7.5 47.5  2/12 2.2 At goal, continue   7.5 5 7.5 7.5 7.5 5 7.5 47.5  2/8 1.82 Per [facility]   2/5 3.9 Above goal, hold   7.5 5 0/7.5 5/7.5 7.5 5 7.5 47.5  1/17 3.4 Above goal, continue  7.5 5 7.5 7.5 7.5 5 7.5 47.5  11/22 2.7 At goal, continue  7.5 5 7.5 7.5 7.5 5 7.5 47.5  10/11 2.9 At goal, continue  7.5 5 7.5 7.5 7.5 5 7.5 47.5  8/28 2.7  At goal, continue  7.5 5 7.5 7.5 7.5 5 7.5 47.5  8/2 2.37 Per lab    7/7 2.4  At goal, continue  7.5 5 7.5 7.5 7.5 5 7.5 47.5  5/26 2.7  At goal, continue  7.5 5 7.5 7.5 7.5 5 7.5 47.5  4/28 3.2 Above goal, continue  7.5 5 7.5 7.5 7.5 5 7.5 47.5  2/16 2.9 At goal, continue  7.5 5 7.5 7.5 7.5 5 7.5 47.5  1/9 2.9 At goal, continue  7.5 5 7.5 7.5 7.5 5 7.5 47.5  12/5 2.5 At goal, continue  7.5 5 7.5 7.5 7.5 5 7.5 47.5  11/7 2.3 At goal,

## 2025-05-05 NOTE — PATIENT INSTRUCTIONS
Wound Care Center Physician Orders and Discharge Instructions  [facility] Wound Care Center   [facility] [address]  Telephone: [phone] FAX [phone]  NAME:  Justin Baeza  YOB: 1965  MEDICAL RECORD NUMBER:  [id number]  DATE: 5/5/2025      Return Appointment:  Return Appointment: With Khang Giles MD  in 1 Week(s)  [x] Return Appointment for a Wound Assessment with the nurse on:     Future Appointments   Date Time Provider Department Center   5/9/2025  2:00 PM [facility] SPECIAL PROCEDURES 1 [facility] SP PROC [facility] Radio   6/30/2025  8:00 AM [facility] MEDICATION MANAGEMENT [facility] MM [facility]               Orders Placed This Encounter   Procedures    Initiate Outpatient Wound Care Protocol     Dr La Nena Page MD  [facility] Vascular Surgery  [address] [place] [address]  [phone]  Kiera Petersen Dr. Page's nurse    Home Care Company: none    Medically necessary services for evaluation and treatment: []Skilled Nursing (using clean technique) []PT (Eval & Treat) []OT (Eval & Treat) []Social Work []Dietician []Other:      Wound care instructions:  If you smoke we ask that you refrain from smoking. Smoking inhibits wounds from healing.  When taking antibiotics take the entire prescription as ordered. Do not stop taking until medication is all gone unless otherwise instructed.   Exercise as tolerated.   Keep weight off wounds and reposition every 2 hours if applicable.  Do not get wounds wet in the bath or shower unless otherwise instructed by your physician. If your wound is on your foot or leg, you may purchase a cast bag. Please ask at the pharmacy.  Wash hands with soap and water prior to and after every dressing change.    [x]Wash wounds with: No need to wash. Leave dressing in place.    [x]Kiah wound Topical Treatments: Do not apply lotions, creams, or ointments to the skin around the wound bed unless directed as followed:

## 2025-05-05 NOTE — PROGRESS NOTES
Multilayer Compression Wrap   (Not Unna) Below the Knee    NAME:  Justin Baeza  YOB: 1965  MEDICAL RECORD NUMBER:  [id number]  DATE:  5/5/2025       Removed old Multilayer wrap if present and washed leg with mild soap/water.   Applied moisturizing agent to dry skin as needed.    Applied primary and secondary dressing as ordered    Applied multilayered dressing below the knee to Right lower leg(s)  (4 Layer Compression Wrap ) .    Instructed patient/caregiver not to remove dressing and to keep it clean and dry.    Instructed patient/caregiver on complications to report to provider, such as pain, numbness in toes, heavy drainage, and slippage of dressing.    Instructed patient on purpose of compression dressing and on activity and exercise recommendations.   Applied per   Guidelines    Electronically signed by Edgar Nunez RN on 5/5/25 at 9:15 AM EDT        
  Wound Surface Area (cm^2) 3.5 cm^2 05/05/25 0812   Change in Wound Size % (l*w) 88.33 05/05/25 0812   Wound Volume (cm^3) 0.35 cm^3 05/05/25 0812   Wound Healing % 88 05/05/25 0812   Post-Procedure Length (cm) 2.6 cm 05/05/25 0851   Post-Procedure Width (cm) 1.5 cm 05/05/25 0851   Post-Procedure Depth (cm) 0.3 cm 05/05/25 0851   Post-Procedure Surface Area (cm^2) 3.9 cm^2 05/05/25 0851   Post-Procedure Volume (cm^3) 1.17 cm^3 05/05/25 0851   Distance Tunneling (cm) 0 cm 05/05/25 0812   Tunneling Position ___ O'Clock 0 03/10/25 0805   Undermining Starts ___ O'Clock 0 03/10/25 0805   Undermining Ends___ O'Clock 0 03/10/25 0805   Undermining Maxium Distance (cm) 0 05/05/25 0812   Wound Assessment Pink/red;Fibrin 05/05/25 0812   Drainage Amount Moderate (25-50%) 05/05/25 0812   Drainage Description Brown;Serosanguinous;Green 05/05/25 0812   Odor Mild 05/05/25 0812   Kiah-wound Assessment Maceration;Erosion 05/05/25 0812   Margins Defined edges 05/05/25 0812   Wound Thickness Description not for Pressure Injury Full thickness 05/05/25 0812   Number of days: 486          Percent of Wound Debrided: 100%    Total Surface Area Debrided:  36.4 sq cm    Diabetic/Pressure/Non Pressure Ulcers only:  Ulcer: Non-Pressure ulcer, fat layer exposed    Bleeding: Minimal    Hemostasis Achieved: by pressure    Procedural Pain: 0  / 10     Post Procedural Pain: 0 / 10     Response to treatment:  Well tolerated by patient.         Plan:   Strongly encouraged patient to make arrangements to reschedule dilatation procedure if he is unable to make appointment this Friday.  In addition we will begin Augmentin once again due to increased maceration and drainage from wound  Treatment Note: Please see attached Discharge Instructions.  These instructions were given and signed by the patient or POA    New Prescriptions    AMOXICILLIN-CLAVULANATE (AUGMENTIN) 875-125 MG PER TABLET    Take 1 tablet by mouth 2 times daily for 10 days       Orders

## 2025-05-12 ENCOUNTER — HOSPITAL ENCOUNTER (OUTPATIENT)
Dept: WOUND CARE | Age: 60
Discharge: HOME | End: 2025-05-12
Attending: SPECIALIST
Payer: COMMERCIAL

## 2025-05-12 VITALS
RESPIRATION RATE: 16 BRPM | TEMPERATURE: 97 F | HEART RATE: 73 BPM | SYSTOLIC BLOOD PRESSURE: 151 MMHG | DIASTOLIC BLOOD PRESSURE: 81 MMHG

## 2025-05-12 DIAGNOSIS — I83.009 VENOUS ULCER (HCC): ICD-10-CM

## 2025-05-12 DIAGNOSIS — I89.0 LYMPHEDEMA: ICD-10-CM

## 2025-05-12 DIAGNOSIS — I89.0 CHRONIC ACQUIRED LYMPHEDEMA: Primary | ICD-10-CM

## 2025-05-12 DIAGNOSIS — L97.909 VENOUS ULCER (HCC): ICD-10-CM

## 2025-05-12 DIAGNOSIS — Z91.199 NONCOMPLIANCE: ICD-10-CM

## 2025-05-12 DIAGNOSIS — I87.331 IDIOPATHIC CHRONIC VENOUS HYPERTENSION OF RIGHT LOWER EXTREMITY WITH ULCER AND INFLAMMATION (HCC): ICD-10-CM

## 2025-05-12 PROCEDURE — 11045 DBRDMT SUBQ TISS EACH ADDL: CPT

## 2025-05-12 PROCEDURE — 29581 APPL MULTLAYER CMPRN SYS LEG: CPT

## 2025-05-12 PROCEDURE — 11042 DBRDMT SUBQ TIS 1ST 20SQCM/<: CPT | Performed by: SPECIALIST

## 2025-05-12 PROCEDURE — 6370000000 HC RX 637 (ALT 250 FOR IP): Performed by: SPECIALIST

## 2025-05-12 PROCEDURE — 11042 DBRDMT SUBQ TIS 1ST 20SQCM/<: CPT

## 2025-05-12 PROCEDURE — 11045 DBRDMT SUBQ TISS EACH ADDL: CPT | Performed by: SPECIALIST

## 2025-05-12 RX ORDER — LIDOCAINE 40 MG/G
CREAM TOPICAL ONCE
OUTPATIENT
Start: 2025-05-12 | End: 2025-05-12

## 2025-05-12 RX ORDER — SODIUM CHLOR/HYPOCHLOROUS ACID 0.033 %
SOLUTION, IRRIGATION IRRIGATION ONCE
OUTPATIENT
Start: 2025-05-12 | End: 2025-05-12

## 2025-05-12 RX ORDER — BACITRACIN ZINC AND POLYMYXIN B SULFATE 500; 1000 [USP'U]/G; [USP'U]/G
OINTMENT TOPICAL ONCE
OUTPATIENT
Start: 2025-05-12 | End: 2025-05-12

## 2025-05-12 RX ORDER — GENTAMICIN SULFATE 1 MG/G
OINTMENT TOPICAL ONCE
OUTPATIENT
Start: 2025-05-12 | End: 2025-05-12

## 2025-05-12 RX ORDER — NEOMYCIN/BACITRACIN/POLYMYXINB 3.5-400-5K
OINTMENT (GRAM) TOPICAL ONCE
OUTPATIENT
Start: 2025-05-12 | End: 2025-05-12

## 2025-05-12 RX ORDER — GINSENG 100 MG
CAPSULE ORAL ONCE
OUTPATIENT
Start: 2025-05-12 | End: 2025-05-12

## 2025-05-12 RX ORDER — LIDOCAINE HYDROCHLORIDE 20 MG/ML
JELLY TOPICAL ONCE
OUTPATIENT
Start: 2025-05-12 | End: 2025-05-12

## 2025-05-12 RX ORDER — LIDOCAINE HYDROCHLORIDE 40 MG/ML
SOLUTION TOPICAL ONCE
Status: COMPLETED | OUTPATIENT
Start: 2025-05-12 | End: 2025-05-12

## 2025-05-12 RX ORDER — CLOBETASOL PROPIONATE 0.5 MG/G
OINTMENT TOPICAL ONCE
OUTPATIENT
Start: 2025-05-12 | End: 2025-05-12

## 2025-05-12 RX ORDER — SILVER SULFADIAZINE 10 MG/G
CREAM TOPICAL ONCE
OUTPATIENT
Start: 2025-05-12 | End: 2025-05-12

## 2025-05-12 RX ORDER — TRIAMCINOLONE ACETONIDE 1 MG/G
OINTMENT TOPICAL ONCE
OUTPATIENT
Start: 2025-05-12 | End: 2025-05-12

## 2025-05-12 RX ORDER — BETAMETHASONE DIPROPIONATE 0.05 %
OINTMENT (GRAM) TOPICAL ONCE
OUTPATIENT
Start: 2025-05-12 | End: 2025-05-12

## 2025-05-12 RX ORDER — LIDOCAINE HYDROCHLORIDE 40 MG/ML
SOLUTION TOPICAL ONCE
OUTPATIENT
Start: 2025-05-12 | End: 2025-05-12

## 2025-05-12 RX ORDER — MUPIROCIN 20 MG/G
OINTMENT TOPICAL ONCE
OUTPATIENT
Start: 2025-05-12 | End: 2025-05-12

## 2025-05-12 RX ORDER — LIDOCAINE 50 MG/G
OINTMENT TOPICAL ONCE
OUTPATIENT
Start: 2025-05-12 | End: 2025-05-12

## 2025-05-12 RX ADMIN — LIDOCAINE HYDROCHLORIDE: 40 SOLUTION TOPICAL at 08:22

## 2025-05-12 ASSESSMENT — PAIN SCALES - GENERAL: PAINLEVEL_OUTOF10: 2

## 2025-05-12 ASSESSMENT — PAIN DESCRIPTION - ORIENTATION: ORIENTATION: RIGHT

## 2025-05-12 ASSESSMENT — PAIN DESCRIPTION - LOCATION: LOCATION: LEG

## 2025-05-12 ASSESSMENT — PAIN DESCRIPTION - DESCRIPTORS: DESCRIPTORS: ACHING;BURNING

## 2025-05-12 NOTE — PATIENT INSTRUCTIONS
Wound Care Center Physician Orders and Discharge Instructions  [facility]   [address] [place] [address] [address]  Telephone: [phone] FAX [phone]  NAME:  Justin Baeza  YOB: 1965  MEDICAL RECORD NUMBER:  [id number]  DATE: 5/12/2025      Return Appointment:  Return Appointment: With Khang Giles MD  in 1 Week(s)  [x] Return Appointment for a Wound Assessment with the nurse on:     Future Appointments   Date Time Provider Department Center   6/30/2025  8:00 AM [facility] MEDICATION MANAGEMENT HZ MM [facility]               Orders Placed This Encounter   Procedures    Initiate Outpatient Wound Care Protocol     Dr La Nena Page MD  [facility] Vascular Surgery  [address] [place] [address]  [phone]  Kiera Petersen Dr. Page's nurse    Home Care Company: none    Medically necessary services for evaluation and treatment: []Skilled Nursing (using clean technique) []PT (Eval & Treat) []OT (Eval & Treat) []Social Work []Dietician []Other:      Wound care instructions:  If you smoke we ask that you refrain from smoking. Smoking inhibits wounds from healing.  When taking antibiotics take the entire prescription as ordered. Do not stop taking until medication is all gone unless otherwise instructed.   Exercise as tolerated.   Keep weight off wounds and reposition every 2 hours if applicable.  Do not get wounds wet in the bath or shower unless otherwise instructed by your physician. If your wound is on your foot or leg, you may purchase a cast bag. Please ask at the pharmacy.  Wash hands with soap and water prior to and after every dressing change.    [x]Wash wounds with: No need to wash. Leave dressing in place.    [x]Kiah wound Topical Treatments: Do not apply lotions, creams, or ointments to the skin around the wound bed unless directed as followed:   Apply around the wound:  zinc paste        [x]Wound Location: right lower

## 2025-05-12 NOTE — PROGRESS NOTES
[facility]  Progress Note and Procedure Note      Justin Baeza  MEDICAL RECORD NUMBER:  [id number]  AGE: 60 y.o.   GENDER: male  : 1965  EPISODE DATE:  2025    Subjective:     Chief Complaint   Patient presents with    Wound Check         RLE             HISTORY of PRESENT ILLNESS HPI     Justin Baeza is a 60 y.o. male who presents today for wound/ulcer evaluation.   History of Wound Context: Patient continues follow-up for chronic venous insufficiency with ulceration and lymphedema right lower extremity. He had now been off his Augmentin for at least 3 weeks. Patient continues to be on his feet extended periods of time during the day. Little utilization of lymphedema pumps. Once again we have been using a PolyMem with silver primary dressing. Patient has now seen Dr. Page from vascular surgery and he is now scheduled for interventional radiologist to attempt balloon dilatation of IVC.  However patient states he will be unable to keep his scheduled appointment in radiology which was scheduled for this week.  Patient has yet has been unable to reschedule.  He remains on his Augmentin which was refilled last week  Wound/Ulcer Pain Timing/Severity: none  Quality of pain: N/A  Severity:  0 / 10   Modifying Factors: None  Associated Signs/Symptoms: drainage    Ulcer Identification:  Ulcer Type: venous    Contributing Factors: edema, venous stasis, lymphedema, obesity, and anticoagulation therapy    Acute Wound: N/A not an acute wound    PAST MEDICAL HISTORY        Diagnosis Date    Difficult intravenous access     not bedside candidate for PICC lines both arms no thread, needs IR or tunneled    DVT (deep venous thrombosis) (HCC)     Hx of blood clots     Pain and swelling of right lower leg        PAST SURGICAL HISTORY    Past Surgical History:   Procedure Laterality Date    BALLOON ANGIOPLASTY, ARTERY Right 2017    at [facility]    DILATATION, ESOPHAGUS      SKIN SPLIT

## 2025-05-22 ENCOUNTER — TELEPHONE (OUTPATIENT)
Dept: GENERAL RADIOLOGY | Age: 60
End: 2025-05-22

## 2025-05-22 ENCOUNTER — HOSPITAL ENCOUNTER (OUTPATIENT)
Dept: WOUND CARE | Age: 60
Discharge: HOME | End: 2025-05-22
Attending: SPECIALIST
Payer: COMMERCIAL

## 2025-05-22 VITALS
DIASTOLIC BLOOD PRESSURE: 77 MMHG | RESPIRATION RATE: 18 BRPM | HEART RATE: 67 BPM | SYSTOLIC BLOOD PRESSURE: 155 MMHG | TEMPERATURE: 98.3 F

## 2025-05-22 DIAGNOSIS — Z91.199 NONCOMPLIANCE: ICD-10-CM

## 2025-05-22 DIAGNOSIS — I83.009 VENOUS ULCER (HCC): ICD-10-CM

## 2025-05-22 DIAGNOSIS — I89.0 LYMPHEDEMA: ICD-10-CM

## 2025-05-22 DIAGNOSIS — I89.0 CHRONIC ACQUIRED LYMPHEDEMA: Primary | ICD-10-CM

## 2025-05-22 DIAGNOSIS — I87.331 IDIOPATHIC CHRONIC VENOUS HYPERTENSION OF RIGHT LOWER EXTREMITY WITH ULCER AND INFLAMMATION (HCC): ICD-10-CM

## 2025-05-22 DIAGNOSIS — L97.909 VENOUS ULCER (HCC): ICD-10-CM

## 2025-05-22 PROCEDURE — 29581 APPL MULTLAYER CMPRN SYS LEG: CPT

## 2025-05-22 PROCEDURE — 11042 DBRDMT SUBQ TIS 1ST 20SQCM/<: CPT | Performed by: SPECIALIST

## 2025-05-22 PROCEDURE — 11042 DBRDMT SUBQ TIS 1ST 20SQCM/<: CPT

## 2025-05-22 PROCEDURE — 11045 DBRDMT SUBQ TISS EACH ADDL: CPT

## 2025-05-22 PROCEDURE — 11045 DBRDMT SUBQ TISS EACH ADDL: CPT | Performed by: SPECIALIST

## 2025-05-22 PROCEDURE — 6370000000 HC RX 637 (ALT 250 FOR IP): Performed by: SPECIALIST

## 2025-05-22 RX ORDER — BACITRACIN ZINC AND POLYMYXIN B SULFATE 500; 1000 [USP'U]/G; [USP'U]/G
OINTMENT TOPICAL ONCE
OUTPATIENT
Start: 2025-05-22 | End: 2025-05-22

## 2025-05-22 RX ORDER — SILVER SULFADIAZINE 10 MG/G
CREAM TOPICAL ONCE
OUTPATIENT
Start: 2025-05-22 | End: 2025-05-22

## 2025-05-22 RX ORDER — LIDOCAINE HYDROCHLORIDE 40 MG/ML
SOLUTION TOPICAL ONCE
OUTPATIENT
Start: 2025-05-22 | End: 2025-05-22

## 2025-05-22 RX ORDER — SODIUM CHLOR/HYPOCHLOROUS ACID 0.033 %
SOLUTION, IRRIGATION IRRIGATION ONCE
OUTPATIENT
Start: 2025-05-22 | End: 2025-05-22

## 2025-05-22 RX ORDER — LIDOCAINE HYDROCHLORIDE 40 MG/ML
SOLUTION TOPICAL ONCE
Status: COMPLETED | OUTPATIENT
Start: 2025-05-22 | End: 2025-05-22

## 2025-05-22 RX ORDER — GINSENG 100 MG
CAPSULE ORAL ONCE
OUTPATIENT
Start: 2025-05-22 | End: 2025-05-22

## 2025-05-22 RX ORDER — BETAMETHASONE DIPROPIONATE 0.05 %
OINTMENT (GRAM) TOPICAL ONCE
OUTPATIENT
Start: 2025-05-22 | End: 2025-05-22

## 2025-05-22 RX ORDER — GENTAMICIN SULFATE 1 MG/G
OINTMENT TOPICAL ONCE
OUTPATIENT
Start: 2025-05-22 | End: 2025-05-22

## 2025-05-22 RX ORDER — LIDOCAINE HYDROCHLORIDE 20 MG/ML
JELLY TOPICAL ONCE
OUTPATIENT
Start: 2025-05-22 | End: 2025-05-22

## 2025-05-22 RX ORDER — LIDOCAINE 50 MG/G
OINTMENT TOPICAL ONCE
OUTPATIENT
Start: 2025-05-22 | End: 2025-05-22

## 2025-05-22 RX ORDER — CLOBETASOL PROPIONATE 0.5 MG/G
OINTMENT TOPICAL ONCE
OUTPATIENT
Start: 2025-05-22 | End: 2025-05-22

## 2025-05-22 RX ORDER — LIDOCAINE 40 MG/G
CREAM TOPICAL ONCE
OUTPATIENT
Start: 2025-05-22 | End: 2025-05-22

## 2025-05-22 RX ORDER — MUPIROCIN 20 MG/G
OINTMENT TOPICAL ONCE
OUTPATIENT
Start: 2025-05-22 | End: 2025-05-22

## 2025-05-22 RX ORDER — TRIAMCINOLONE ACETONIDE 1 MG/G
OINTMENT TOPICAL ONCE
OUTPATIENT
Start: 2025-05-22 | End: 2025-05-22

## 2025-05-22 RX ORDER — NEOMYCIN/BACITRACIN/POLYMYXINB 3.5-400-5K
OINTMENT (GRAM) TOPICAL ONCE
OUTPATIENT
Start: 2025-05-22 | End: 2025-05-22

## 2025-05-22 RX ADMIN — LIDOCAINE HYDROCHLORIDE: 40 SOLUTION TOPICAL at 08:31

## 2025-05-22 NOTE — PATIENT INSTRUCTIONS
Wound Care Center Physician Orders and Discharge Instructions  [facility]   [address]  Telephone: [phone] FAX [phone]  NAME:  Justin Baeza  YOB: 1965  MEDICAL RECORD NUMBER:  [id number]  DATE: 5/22/2025      Return Appointment:  Return Appointment: With Khang Giles MD  in 1 Week(s)  [x] Return Appointment for a Wound Assessment with the nurse on:     Future Appointments   Date Time Provider Department Center   6/30/2025  8:00 AM [facility] MEDICATION MANAGEMENT [facility]       Orders Placed This Encounter   Procedures    Initiate Outpatient Wound Care Protocol         Wound care instructions:  If you smoke we ask that you refrain from smoking. Smoking inhibits wounds from healing.  When taking antibiotics take the entire prescription as ordered. Do not stop taking until medication is all gone unless otherwise instructed.   Exercise as tolerated.   Keep weight off wounds and reposition every 2 hours if applicable.  Do not get wounds wet in the bath or shower unless otherwise instructed by your physician. If your wound is on your foot or leg, you may purchase a cast bag. Please ask at the pharmacy.  Wash hands with soap and water prior to and after every dressing change.    [x]Wash wounds with: No need to wash. Leave dressing in place.    [x]Kiah wound Topical Treatments: Do not apply lotions, creams, or ointments to the skin around the wound bed unless directed as followed:   Apply around the wound:  zinc paste        [x]Wound Location: right lower leg wounds  Apply Primary Dressing to wound: polymem AG  Secondary Dressing: Extra absorbant pad   Avoid contact of tape with skin if possible.  When to change Dressing: DO NOT CHANGE      [x] Multilayer Compression Wrap:  Type: Applied on Right lower leg(s)  4 Layer Compression Wrap- second layer of cast padding below calf     Do not get leg(s) with wrap wet.  If wraps become too

## 2025-05-22 NOTE — PROGRESS NOTES
Multilayer Compression Wrap   (Not Unna) Below the Knee    NAME:  Justin Baeza  YOB: 1965  MEDICAL RECORD NUMBER:  [id number]  DATE:  5/22/2025       Removed old Multilayer wrap if present and washed leg with mild soap/water.   Applied moisturizing agent to dry skin as needed.    Applied primary and secondary dressing as ordered    Applied multilayered dressing below the knee to Right lower leg(s)  (4 Layer Compression Wrap ) .    Instructed patient/caregiver not to remove dressing and to keep it clean and dry.    Instructed patient/caregiver on complications to report to provider, such as pain, numbness in toes, heavy drainage, and slippage of dressing.    Instructed patient on purpose of compression dressing and on activity and exercise recommendations.   Applied per   Guidelines    Electronically signed by Kathy Gomez RN on 5/22/25 at 9:06 AM EDT        
as followed:   Apply around the wound:  zinc paste        [x]Wound Location: right lower leg wounds  Apply Primary Dressing to wound: polymem AG  Secondary Dressing: Extra absorbant pad   Avoid contact of tape with skin if possible.  When to change Dressing: DO NOT CHANGE      [x] Multilayer Compression Wrap:  Type: Applied on Right lower leg(s)  4 Layer Compression Wrap- second layer of cast padding below calf     Do not get leg(s) with wrap wet.  If wraps become too tight call the center or completely remove the wrap.   Elevate leg(s) above the level of the heart when sitting.  Avoid prolonged standing in one place.   Applied in Clinic on 5/22/2025  The Goal of this therapy is to reduce edema and get into long term compression garments to control venous insufficiency, lymphedema and reduce occurrence of venous ulcers    [x] Edema Control: 30-40mmHG  Apply: Compression Stocking on the Left leg  Apply every morning immediately when getting up. Remove every night before going to bed unless instructed otherwise     Elevate leg(s) above the level of the heart for 30 minutes 4-5 times a day and/or when sitting.  Avoid prolonged standing in one place.    [x] Lymphedema Therapy:  Wear Lymphedema pumps twice a day at settings prescribed by your physician.   Avoid prolonged standing in one place.      Dietary:  Important dietary reminders:  1. Increase Protein intake (i.e. Lean meats, fish, eggs, legumes, and yogurt)  2. No added salt  3. If diabetic, follow a diabetic diet and check glucose prior to meals or as instructed by your physician.    Dietary Supplements(Take twice a day unless instructed otherwise):  [] Papito  [] 30ml ProStat [] Ensure Complete [] Ensure Max/Premier [] Expedite [] Other:    Your nurse  is:  talisha     Electronically signed by Edgar Nunez RN on 5/22/2025 at 8:40 AM     Wound Care Center Information: Should you experience any significant changes in your wound(s) or have questions

## 2025-05-27 ENCOUNTER — TELEPHONE (OUTPATIENT)
Dept: GENERAL RADIOLOGY | Age: 60
End: 2025-05-27

## 2025-05-27 NOTE — TELEPHONE ENCOUNTER
Second call attempt to reschedule consult with IR. Vm message left with call back requested. [phone]. Please reach out with questions or concerns.

## 2025-05-29 ENCOUNTER — HOSPITAL ENCOUNTER (OUTPATIENT)
Dept: WOUND CARE | Age: 60
Discharge: HOME | End: 2025-05-29
Attending: SPECIALIST
Payer: COMMERCIAL

## 2025-05-29 VITALS
DIASTOLIC BLOOD PRESSURE: 78 MMHG | TEMPERATURE: 98 F | RESPIRATION RATE: 16 BRPM | HEART RATE: 64 BPM | SYSTOLIC BLOOD PRESSURE: 143 MMHG

## 2025-05-29 DIAGNOSIS — I87.331 IDIOPATHIC CHRONIC VENOUS HYPERTENSION OF RIGHT LOWER EXTREMITY WITH ULCER AND INFLAMMATION (HCC): ICD-10-CM

## 2025-05-29 DIAGNOSIS — I89.0 LYMPHEDEMA: ICD-10-CM

## 2025-05-29 DIAGNOSIS — L97.909 VENOUS ULCER (HCC): ICD-10-CM

## 2025-05-29 DIAGNOSIS — Z91.199 NONCOMPLIANCE: ICD-10-CM

## 2025-05-29 DIAGNOSIS — I83.009 VENOUS ULCER (HCC): ICD-10-CM

## 2025-05-29 DIAGNOSIS — I89.0 CHRONIC ACQUIRED LYMPHEDEMA: Primary | ICD-10-CM

## 2025-05-29 PROCEDURE — 11045 DBRDMT SUBQ TISS EACH ADDL: CPT

## 2025-05-29 PROCEDURE — 6370000000 HC RX 637 (ALT 250 FOR IP): Performed by: SPECIALIST

## 2025-05-29 PROCEDURE — 11045 DBRDMT SUBQ TISS EACH ADDL: CPT | Performed by: SPECIALIST

## 2025-05-29 PROCEDURE — 11042 DBRDMT SUBQ TIS 1ST 20SQCM/<: CPT | Performed by: SPECIALIST

## 2025-05-29 PROCEDURE — 29581 APPL MULTLAYER CMPRN SYS LEG: CPT

## 2025-05-29 PROCEDURE — 11042 DBRDMT SUBQ TIS 1ST 20SQCM/<: CPT

## 2025-05-29 RX ORDER — LIDOCAINE HYDROCHLORIDE 40 MG/ML
SOLUTION TOPICAL ONCE
OUTPATIENT
Start: 2025-05-29 | End: 2025-05-29

## 2025-05-29 RX ORDER — LIDOCAINE HYDROCHLORIDE 20 MG/ML
JELLY TOPICAL ONCE
OUTPATIENT
Start: 2025-05-29 | End: 2025-05-29

## 2025-05-29 RX ORDER — MUPIROCIN 20 MG/G
OINTMENT TOPICAL ONCE
OUTPATIENT
Start: 2025-05-29 | End: 2025-05-29

## 2025-05-29 RX ORDER — SODIUM CHLOR/HYPOCHLOROUS ACID 0.033 %
SOLUTION, IRRIGATION IRRIGATION ONCE
OUTPATIENT
Start: 2025-05-29 | End: 2025-05-29

## 2025-05-29 RX ORDER — GINSENG 100 MG
CAPSULE ORAL ONCE
OUTPATIENT
Start: 2025-05-29 | End: 2025-05-29

## 2025-05-29 RX ORDER — TRIAMCINOLONE ACETONIDE 1 MG/G
OINTMENT TOPICAL ONCE
OUTPATIENT
Start: 2025-05-29 | End: 2025-05-29

## 2025-05-29 RX ORDER — GENTAMICIN SULFATE 1 MG/G
OINTMENT TOPICAL ONCE
OUTPATIENT
Start: 2025-05-29 | End: 2025-05-29

## 2025-05-29 RX ORDER — LIDOCAINE HYDROCHLORIDE 40 MG/ML
SOLUTION TOPICAL ONCE
Status: COMPLETED | OUTPATIENT
Start: 2025-05-29 | End: 2025-05-29

## 2025-05-29 RX ORDER — BACITRACIN ZINC AND POLYMYXIN B SULFATE 500; 1000 [USP'U]/G; [USP'U]/G
OINTMENT TOPICAL ONCE
OUTPATIENT
Start: 2025-05-29 | End: 2025-05-29

## 2025-05-29 RX ORDER — LIDOCAINE 40 MG/G
CREAM TOPICAL ONCE
OUTPATIENT
Start: 2025-05-29 | End: 2025-05-29

## 2025-05-29 RX ORDER — SILVER SULFADIAZINE 10 MG/G
CREAM TOPICAL ONCE
OUTPATIENT
Start: 2025-05-29 | End: 2025-05-29

## 2025-05-29 RX ORDER — LIDOCAINE 50 MG/G
OINTMENT TOPICAL ONCE
OUTPATIENT
Start: 2025-05-29 | End: 2025-05-29

## 2025-05-29 RX ORDER — CLOBETASOL PROPIONATE 0.5 MG/G
OINTMENT TOPICAL ONCE
OUTPATIENT
Start: 2025-05-29 | End: 2025-05-29

## 2025-05-29 RX ORDER — BETAMETHASONE DIPROPIONATE 0.05 %
OINTMENT (GRAM) TOPICAL ONCE
OUTPATIENT
Start: 2025-05-29 | End: 2025-05-29

## 2025-05-29 RX ORDER — NEOMYCIN/BACITRACIN/POLYMYXINB 3.5-400-5K
OINTMENT (GRAM) TOPICAL ONCE
OUTPATIENT
Start: 2025-05-29 | End: 2025-05-29

## 2025-05-29 RX ADMIN — LIDOCAINE HYDROCHLORIDE: 40 SOLUTION TOPICAL at 08:21

## 2025-05-29 ASSESSMENT — PAIN DESCRIPTION - FREQUENCY: FREQUENCY: CONTINUOUS

## 2025-05-29 ASSESSMENT — PAIN SCALES - GENERAL: PAINLEVEL_OUTOF10: 2

## 2025-05-29 ASSESSMENT — PAIN DESCRIPTION - LOCATION: LOCATION: LEG

## 2025-05-29 ASSESSMENT — PAIN DESCRIPTION - ORIENTATION: ORIENTATION: RIGHT

## 2025-05-29 ASSESSMENT — PAIN DESCRIPTION - PAIN TYPE: TYPE: CHRONIC PAIN

## 2025-05-29 ASSESSMENT — PAIN DESCRIPTION - DESCRIPTORS: DESCRIPTORS: ACHING;BURNING

## 2025-05-29 NOTE — PROGRESS NOTES
[facility]  Progress Note and Procedure Note      Justin Baeza  MEDICAL RECORD NUMBER:  [id number]  AGE: 60 y.o.   GENDER: male  : 1965  EPISODE DATE:  2025    Subjective:     Chief Complaint   Patient presents with    Wound Check     RLE         HISTORY of PRESENT ILLNESS HPI     Justin Baeza is a 60 y.o. male who presents today for wound/ulcer evaluation.   History of Wound Context:  Patient continues follow-up for chronic venous insufficiency with ulceration and lymphedema right lower extremity. He had now been off his Augmentin for at least 3 weeks. Patient continues to be on his feet extended periods of time during the day. Little utilization of lymphedema pumps. Once again we have been using a PolyMem with silver primary dressing. Patient has now seen Dr. Page from vascular surgery and he is now scheduled for interventional radiologist to attempt balloon dilatation of IVC.  However patient states he will be unable to keep his scheduled appointment in radiology which was scheduled for this week.  Patient has yet has been unable to reschedule.  He remains on his Augmentin which was refilled last week.  Patient has been on vacation this past week.  He did remark that by keeping his leg elevated there has been improvement he is now scheduled for his balloon dilatation of his IVC on Suzanne 10  Wound/Ulcer Pain Timing/Severity: none  Quality of pain: N/A  Severity:  0 / 10   Modifying Factors: None  Associated Signs/Symptoms: drainage    Ulcer Identification:  Ulcer Type: venous    Contributing Factors: edema, venous stasis, lymphedema, obesity, anticoagulation therapy, and non-adherence    Acute Wound: N/A not an acute wound    PAST MEDICAL HISTORY        Diagnosis Date    Difficult intravenous access     not bedside candidate for PICC lines both arms no thread, needs IR or tunneled    DVT (deep venous thrombosis) (HCC)     Hx of blood clots     Pain and swelling of right

## 2025-05-29 NOTE — PATIENT INSTRUCTIONS
Wound Care Center Physician Orders and Discharge Instructions  [facility]   [address] [place]  Telephone: [phone] FAX [phone]  NAME:  Justin Baeza  YOB: 1965  MEDICAL RECORD NUMBER:  [id number]  DATE: 5/29/2025      Return Appointment:  Return Appointment: With Khang Giles MD  in 1 Week(s)  [x] Return Appointment for a Wound Assessment with the nurse on:     Future Appointments   Date Time Provider Department Center   6/10/2025 12:00 PM [facility] SPECIAL PROCEDURES 1 [facility] SP PROC [facility] Radio   6/30/2025  8:00 AM [facility] MEDICATION MANAGEMENT [facility]       Orders Placed This Encounter   Procedures    Initiate Outpatient Wound Care Protocol         Wound care instructions:  If you smoke we ask that you refrain from smoking. Smoking inhibits wounds from healing.  When taking antibiotics take the entire prescription as ordered. Do not stop taking until medication is all gone unless otherwise instructed.   Exercise as tolerated.   Keep weight off wounds and reposition every 2 hours if applicable.  Do not get wounds wet in the bath or shower unless otherwise instructed by your physician. If your wound is on your foot or leg, you may purchase a cast bag. Please ask at the pharmacy.  Wash hands with soap and water prior to and after every dressing change.    [x]Wash wounds with: No need to wash. Leave dressing in place.    [x]Kiah wound Topical Treatments: Do not apply lotions, creams, or ointments to the skin around the wound bed unless directed as followed:   Apply around the wound:  moisturizing lotion        [x]Wound Location: right lower leg wounds  Apply Primary Dressing to wound: polymem AG  Secondary Dressing: Extra absorbant pad   Avoid contact of tape with skin if possible.  When to change Dressing: DO NOT CHANGE      [x] Multilayer Compression Wrap:  Type: Applied on Right lower leg(s)  4 Layer Compression Wrap    Do not get

## 2025-06-05 ENCOUNTER — HOSPITAL ENCOUNTER (OUTPATIENT)
Dept: WOUND CARE | Age: 60
Discharge: HOME | End: 2025-06-05
Attending: SPECIALIST
Payer: COMMERCIAL

## 2025-06-05 VITALS
RESPIRATION RATE: 16 BRPM | DIASTOLIC BLOOD PRESSURE: 78 MMHG | HEART RATE: 77 BPM | SYSTOLIC BLOOD PRESSURE: 137 MMHG | TEMPERATURE: 96 F

## 2025-06-05 DIAGNOSIS — I89.0 CHRONIC ACQUIRED LYMPHEDEMA: Primary | ICD-10-CM

## 2025-06-05 DIAGNOSIS — I83.009 VENOUS ULCER (HCC): ICD-10-CM

## 2025-06-05 DIAGNOSIS — Z91.199 NONCOMPLIANCE: ICD-10-CM

## 2025-06-05 DIAGNOSIS — I89.0 LYMPHEDEMA: ICD-10-CM

## 2025-06-05 DIAGNOSIS — I87.331 IDIOPATHIC CHRONIC VENOUS HYPERTENSION OF RIGHT LOWER EXTREMITY WITH ULCER AND INFLAMMATION (HCC): ICD-10-CM

## 2025-06-05 DIAGNOSIS — L97.909 VENOUS ULCER (HCC): ICD-10-CM

## 2025-06-05 PROCEDURE — 6370000000 HC RX 637 (ALT 250 FOR IP): Performed by: SPECIALIST

## 2025-06-05 PROCEDURE — 29581 APPL MULTLAYER CMPRN SYS LEG: CPT

## 2025-06-05 PROCEDURE — 11046 DBRDMT MUSC&/FSCA EA ADDL: CPT

## 2025-06-05 PROCEDURE — 11042 DBRDMT SUBQ TIS 1ST 20SQCM/<: CPT

## 2025-06-05 RX ORDER — LIDOCAINE 50 MG/G
OINTMENT TOPICAL ONCE
OUTPATIENT
Start: 2025-06-05 | End: 2025-06-05

## 2025-06-05 RX ORDER — LIDOCAINE 40 MG/G
CREAM TOPICAL ONCE
OUTPATIENT
Start: 2025-06-05 | End: 2025-06-05

## 2025-06-05 RX ORDER — SILVER SULFADIAZINE 10 MG/G
CREAM TOPICAL ONCE
OUTPATIENT
Start: 2025-06-05 | End: 2025-06-05

## 2025-06-05 RX ORDER — NEOMYCIN/BACITRACIN/POLYMYXINB 3.5-400-5K
OINTMENT (GRAM) TOPICAL ONCE
OUTPATIENT
Start: 2025-06-05 | End: 2025-06-05

## 2025-06-05 RX ORDER — TRIAMCINOLONE ACETONIDE 1 MG/G
OINTMENT TOPICAL ONCE
OUTPATIENT
Start: 2025-06-05 | End: 2025-06-05

## 2025-06-05 RX ORDER — LIDOCAINE HYDROCHLORIDE 20 MG/ML
JELLY TOPICAL ONCE
OUTPATIENT
Start: 2025-06-05 | End: 2025-06-05

## 2025-06-05 RX ORDER — LIDOCAINE HYDROCHLORIDE 40 MG/ML
SOLUTION TOPICAL ONCE
OUTPATIENT
Start: 2025-06-05 | End: 2025-06-05

## 2025-06-05 RX ORDER — CLOBETASOL PROPIONATE 0.5 MG/G
OINTMENT TOPICAL ONCE
OUTPATIENT
Start: 2025-06-05 | End: 2025-06-05

## 2025-06-05 RX ORDER — BACITRACIN ZINC AND POLYMYXIN B SULFATE 500; 1000 [USP'U]/G; [USP'U]/G
OINTMENT TOPICAL ONCE
OUTPATIENT
Start: 2025-06-05 | End: 2025-06-05

## 2025-06-05 RX ORDER — BETAMETHASONE DIPROPIONATE 0.05 %
OINTMENT (GRAM) TOPICAL ONCE
OUTPATIENT
Start: 2025-06-05 | End: 2025-06-05

## 2025-06-05 RX ORDER — GINSENG 100 MG
CAPSULE ORAL ONCE
OUTPATIENT
Start: 2025-06-05 | End: 2025-06-05

## 2025-06-05 RX ORDER — SODIUM CHLOR/HYPOCHLOROUS ACID 0.033 %
SOLUTION, IRRIGATION IRRIGATION ONCE
OUTPATIENT
Start: 2025-06-05 | End: 2025-06-05

## 2025-06-05 RX ORDER — GENTAMICIN SULFATE 1 MG/G
OINTMENT TOPICAL ONCE
OUTPATIENT
Start: 2025-06-05 | End: 2025-06-05

## 2025-06-05 RX ORDER — MUPIROCIN 20 MG/G
OINTMENT TOPICAL ONCE
OUTPATIENT
Start: 2025-06-05 | End: 2025-06-05

## 2025-06-05 RX ORDER — LIDOCAINE HYDROCHLORIDE 40 MG/ML
SOLUTION TOPICAL ONCE
Status: COMPLETED | OUTPATIENT
Start: 2025-06-05 | End: 2025-06-05

## 2025-06-05 RX ADMIN — LIDOCAINE HYDROCHLORIDE: 40 SOLUTION TOPICAL at 08:18

## 2025-06-05 ASSESSMENT — PAIN DESCRIPTION - ORIENTATION: ORIENTATION: RIGHT

## 2025-06-05 ASSESSMENT — PAIN DESCRIPTION - FREQUENCY: FREQUENCY: CONTINUOUS

## 2025-06-05 ASSESSMENT — PAIN DESCRIPTION - LOCATION: LOCATION: LEG

## 2025-06-05 ASSESSMENT — PAIN SCALES - GENERAL: PAINLEVEL_OUTOF10: 2

## 2025-06-05 ASSESSMENT — PAIN DESCRIPTION - DESCRIPTORS: DESCRIPTORS: ACHING;BURNING

## 2025-06-05 ASSESSMENT — PAIN DESCRIPTION - PAIN TYPE: TYPE: CHRONIC PAIN

## 2025-06-05 NOTE — PROGRESS NOTES
Multilayer Compression Wrap   (Not Unna) Below the Knee    NAME:  Justin Baeza  YOB: 1965  MEDICAL RECORD NUMBER:  [id number]  DATE:  6/5/2025       Removed old Multilayer wrap if present and washed leg with mild soap/water.   Applied moisturizing agent to dry skin as needed.    Applied primary and secondary dressing as ordered    Applied multilayered dressing below the knee to Right lower leg(s)  (4 Layer Compression Wrap ) .    Instructed patient/caregiver not to remove dressing and to keep it clean and dry.    Instructed patient/caregiver on complications to report to provider, such as pain, numbness in toes, heavy drainage, and slippage of dressing.    Instructed patient on purpose of compression dressing and on activity and exercise recommendations.   Applied per   Guidelines    Electronically signed by Yesenia Hawkins RN on 6/5/25 at 8:33 AM EDT      
5PM  Wed: CLOSED  Thur: 8AM - 4:30PM  Fri:  8AM - 4:30PM  The office is closed on all major holidays.    Please give us 24-48 business hours to return your call.  These hours of operation are subject to change. If you need help with your wounds and cannot wait until we are available, contact your PCP or go to your preferred emergency room.     Call your doctor now or seek immediate medical care if:    You have symptoms of infection, such as:  Increased pain, swelling, warmth, or redness.  Red streaks leading from the area.  Pus draining from the area.  A fever.       Electronically signed by CHICO VELIZ MD on 6/5/2025 at 8:47 AM

## 2025-06-05 NOTE — PATIENT INSTRUCTIONS
Wound Care Center Physician Orders and Discharge Instructions  [facility]   [address]  Telephone: [phone] FAX [phone]  NAME:  Justin Baeza  YOB: 1965  MEDICAL RECORD NUMBER:  [id number]  DATE: 6/5/2025      Return Appointment:  Return Appointment: With Khang Giles MD  in 1 Week(s)  [x] Return Appointment for a Wound Assessment with the nurse on:     Future Appointments   Date Time Provider Department Center   6/10/2025 12:00 PM [facility] SPECIAL PROCEDURES 1 [facility] SP PROC [facility] Radio   6/30/2025  8:00 AM [facility] MEDICATION MANAGEMENT [facility]       Orders Placed This Encounter   Procedures    Initiate Outpatient Wound Care Protocol         Wound care instructions:  If you smoke we ask that you refrain from smoking. Smoking inhibits wounds from healing.  When taking antibiotics take the entire prescription as ordered. Do not stop taking until medication is all gone unless otherwise instructed.   Exercise as tolerated.   Keep weight off wounds and reposition every 2 hours if applicable.  Do not get wounds wet in the bath or shower unless otherwise instructed by your physician. If your wound is on your foot or leg, you may purchase a cast bag. Please ask at the pharmacy.  Wash hands with soap and water prior to and after every dressing change.    [x]Wash wounds with: No need to wash. Leave dressing in place.    [x]Kiah wound Topical Treatments: Do not apply lotions, creams, or ointments to the skin around the wound bed unless directed as followed:   Apply around the wound:  moisturizing lotion, zinc paste- applied by Dr. Giles        [x]Wound Location: right lower leg wounds  Apply Primary Dressing to wound: polymem AG  Secondary Dressing: Extra absorbant pad   Avoid contact of tape with skin if possible.  When to change Dressing: DO NOT CHANGE      [x] Multilayer Compression Wrap:  Type: Applied on Right lower leg(s)  4 Layer

## 2025-06-10 ENCOUNTER — HOSPITAL ENCOUNTER (OUTPATIENT)
Dept: INTERVENTIONAL RADIOLOGY/VASCULAR | Age: 60
Discharge: HOME | End: 2025-06-12
Attending: SURGERY

## 2025-06-10 DIAGNOSIS — I82.220 INFERIOR VENA CAVA OCCLUSION (HCC): ICD-10-CM

## 2025-06-10 NOTE — CONSULTS
IR Consult Note    60M with PMH including DVT treated several years ago at an outside institution presents with chronic lower extremity edema, heaviness, and refractory venous ulcers. He has tried compression and elevation without much success. He sees wound care for his ulcers and they have told him they cannot help his wounds improve further without improvement in his venous disease.    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 There are large venous collaterals of the anterior abdominal wall and retroperitoneum.     He is on his feet for work and feels that his condition limits his ability to work and perform his activities of daily living.    IVC reconstruction was discussed, including risks, benefits, and alternatives. We also discussed the need for him to continue on anticoagulation after the procedure. All questions were answered and he wishes to proceed.    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 We will plan for IVC reconstruction at his earliest convenience.    Ronald Vargas MD  06/10/25  3:06 PM

## 2025-06-12 ENCOUNTER — HOSPITAL ENCOUNTER (OUTPATIENT)
Dept: WOUND CARE | Age: 60
Discharge: HOME | End: 2025-06-12
Attending: SPECIALIST
Payer: COMMERCIAL

## 2025-06-12 VITALS
SYSTOLIC BLOOD PRESSURE: 133 MMHG | TEMPERATURE: 98 F | DIASTOLIC BLOOD PRESSURE: 81 MMHG | RESPIRATION RATE: 18 BRPM | HEART RATE: 64 BPM

## 2025-06-12 DIAGNOSIS — Z91.199 NONCOMPLIANCE: ICD-10-CM

## 2025-06-12 DIAGNOSIS — I87.331 IDIOPATHIC CHRONIC VENOUS HYPERTENSION OF RIGHT LOWER EXTREMITY WITH ULCER AND INFLAMMATION (HCC): ICD-10-CM

## 2025-06-12 DIAGNOSIS — I89.0 CHRONIC ACQUIRED LYMPHEDEMA: Primary | ICD-10-CM

## 2025-06-12 DIAGNOSIS — I89.0 LYMPHEDEMA: ICD-10-CM

## 2025-06-12 DIAGNOSIS — I83.009 VENOUS ULCER (HCC): ICD-10-CM

## 2025-06-12 DIAGNOSIS — L97.909 VENOUS ULCER (HCC): ICD-10-CM

## 2025-06-12 PROCEDURE — 11045 DBRDMT SUBQ TISS EACH ADDL: CPT | Performed by: SPECIALIST

## 2025-06-12 PROCEDURE — 11042 DBRDMT SUBQ TIS 1ST 20SQCM/<: CPT | Performed by: SPECIALIST

## 2025-06-12 PROCEDURE — 11045 DBRDMT SUBQ TISS EACH ADDL: CPT

## 2025-06-12 PROCEDURE — 6370000000 HC RX 637 (ALT 250 FOR IP): Performed by: SPECIALIST

## 2025-06-12 PROCEDURE — 29581 APPL MULTLAYER CMPRN SYS LEG: CPT

## 2025-06-12 PROCEDURE — 11042 DBRDMT SUBQ TIS 1ST 20SQCM/<: CPT

## 2025-06-12 RX ORDER — LIDOCAINE 40 MG/G
CREAM TOPICAL ONCE
OUTPATIENT
Start: 2025-06-12 | End: 2025-06-12

## 2025-06-12 RX ORDER — GENTAMICIN SULFATE 1 MG/G
OINTMENT TOPICAL ONCE
OUTPATIENT
Start: 2025-06-12 | End: 2025-06-12

## 2025-06-12 RX ORDER — TRIAMCINOLONE ACETONIDE 1 MG/G
OINTMENT TOPICAL ONCE
OUTPATIENT
Start: 2025-06-12 | End: 2025-06-12

## 2025-06-12 RX ORDER — LIDOCAINE HYDROCHLORIDE 20 MG/ML
JELLY TOPICAL ONCE
OUTPATIENT
Start: 2025-06-12 | End: 2025-06-12

## 2025-06-12 RX ORDER — NEOMYCIN/BACITRACIN/POLYMYXINB 3.5-400-5K
OINTMENT (GRAM) TOPICAL ONCE
OUTPATIENT
Start: 2025-06-12 | End: 2025-06-12

## 2025-06-12 RX ORDER — SODIUM CHLOR/HYPOCHLOROUS ACID 0.033 %
SOLUTION, IRRIGATION IRRIGATION ONCE
OUTPATIENT
Start: 2025-06-12 | End: 2025-06-12

## 2025-06-12 RX ORDER — MUPIROCIN 2 %
OINTMENT (GRAM) TOPICAL ONCE
OUTPATIENT
Start: 2025-06-12 | End: 2025-06-12

## 2025-06-12 RX ORDER — LIDOCAINE 50 MG/G
OINTMENT TOPICAL ONCE
OUTPATIENT
Start: 2025-06-12 | End: 2025-06-12

## 2025-06-12 RX ORDER — LIDOCAINE HYDROCHLORIDE 40 MG/ML
SOLUTION TOPICAL ONCE
Status: COMPLETED | OUTPATIENT
Start: 2025-06-12 | End: 2025-06-12

## 2025-06-12 RX ORDER — LIDOCAINE HYDROCHLORIDE 40 MG/ML
SOLUTION TOPICAL ONCE
OUTPATIENT
Start: 2025-06-12 | End: 2025-06-12

## 2025-06-12 RX ORDER — SILVER SULFADIAZINE 10 MG/G
CREAM TOPICAL ONCE
OUTPATIENT
Start: 2025-06-12 | End: 2025-06-12

## 2025-06-12 RX ORDER — BETAMETHASONE DIPROPIONATE 0.05 %
OINTMENT (GRAM) TOPICAL ONCE
OUTPATIENT
Start: 2025-06-12 | End: 2025-06-12

## 2025-06-12 RX ORDER — GINSENG 100 MG
CAPSULE ORAL ONCE
OUTPATIENT
Start: 2025-06-12 | End: 2025-06-12

## 2025-06-12 RX ORDER — BACITRACIN ZINC AND POLYMYXIN B SULFATE 500; 1000 [USP'U]/G; [USP'U]/G
OINTMENT TOPICAL ONCE
OUTPATIENT
Start: 2025-06-12 | End: 2025-06-12

## 2025-06-12 RX ORDER — CLOBETASOL PROPIONATE 0.5 MG/G
OINTMENT TOPICAL ONCE
OUTPATIENT
Start: 2025-06-12 | End: 2025-06-12

## 2025-06-12 RX ADMIN — LIDOCAINE HYDROCHLORIDE: 40 SOLUTION TOPICAL at 08:48

## 2025-06-12 ASSESSMENT — PAIN DESCRIPTION - DESCRIPTORS: DESCRIPTORS: ACHING

## 2025-06-12 ASSESSMENT — PAIN DESCRIPTION - ORIENTATION: ORIENTATION: RIGHT

## 2025-06-12 ASSESSMENT — PAIN SCALES - GENERAL: PAINLEVEL_OUTOF10: 2

## 2025-06-12 ASSESSMENT — PAIN DESCRIPTION - LOCATION: LOCATION: LEG

## 2025-06-12 NOTE — PROGRESS NOTES
Multilayer Compression Wrap   (Not Unna) Below the Knee    NAME:  Justin Baeza  YOB: 1965  MEDICAL RECORD NUMBER:  [id number]  DATE:  6/12/2025       Removed old Multilayer wrap if present and washed leg with mild soap/water.   Applied moisturizing agent to dry skin as needed.    Applied primary and secondary dressing as ordered    Applied multilayered dressing below the knee to Right lower leg(s)  (4 Layer Compression Wrap ) .    Instructed patient/caregiver not to remove dressing and to keep it clean and dry.    Instructed patient/caregiver on complications to report to provider, such as pain, numbness in toes, heavy drainage, and slippage of dressing.    Instructed patient on purpose of compression dressing and on activity and exercise recommendations.   Applied per   Guidelines    Electronically signed by Kathy Gomez RN on 6/12/25 at 9:23 AM EDT        
  Undermining Ends___ O'Clock 0 06/12/25 0835   Undermining Maxium Distance (cm) 0 06/12/25 0835   Wound Assessment Pink/red;Fibrin 06/12/25 0835   Drainage Amount Small (< 25%) 06/12/25 0835   Drainage Description Brown 06/12/25 0835   Odor Mild 06/12/25 0835   Kiah-wound Assessment Dry/flaky 06/12/25 0835   Margins Defined edges 06/12/25 0835   Wound Thickness Description not for Pressure Injury Full thickness 06/12/25 0835   Number of days: 1053       Wound 07/25/22 Leg Right;Posterior;Lower #3 cluster (Active)   Wound Image   06/12/25 0849   Wound Etiology Venous 02/06/25 0826   Dressing Status New dressing applied 02/08/24 0843   Wound Cleansed Vashe 06/12/25 0907   Dressing/Treatment Foam impregnated with Ag 06/12/25 0922   Wound Length (cm) 2 cm 06/12/25 0835   Wound Width (cm) 10 cm 06/12/25 0835   Wound Depth (cm) 0.1 cm 06/12/25 0835   Wound Surface Area (cm^2) 20 cm^2 06/12/25 0835   Change in Wound Size % (l*w) 54.55 06/12/25 0835   Wound Volume (cm^3) 2 cm^3 06/12/25 0835   Wound Healing % 55 06/12/25 0835   Post-Procedure Length (cm) 2.1 cm 06/12/25 0907   Post-Procedure Width (cm) 10.1 cm 06/12/25 0907   Post-Procedure Depth (cm) 0.3 cm 06/12/25 0907   Post-Procedure Surface Area (cm^2) 21.21 cm^2 06/12/25 0907   Post-Procedure Volume (cm^3) 6.363 cm^3 06/12/25 0907   Distance Tunneling (cm) 0 cm 06/12/25 0835   Tunneling Position ___ O'Clock 0 06/12/25 0835   Undermining Starts ___ O'Clock 0 06/12/25 0835   Undermining Ends___ O'Clock 0 06/12/25 0835   Undermining Maxium Distance (cm) 0 06/12/25 0835   Wound Assessment Fibrin;Pink/red 06/12/25 0835   Drainage Amount Moderate (25-50%) 06/12/25 0835   Drainage Description Brown 06/12/25 0835   Odor Mild 06/12/25 0835   Kiah-wound Assessment Intact;Maceration 06/12/25 0835   Margins Defined edges 06/12/25 0835   Wound Thickness Description not for Pressure Injury Full thickness 06/12/25 0835   Number of days: 1052       Wound 01/04/24 Ankle Right;Medial

## 2025-06-12 NOTE — PATIENT INSTRUCTIONS
room.     Call your doctor now or seek immediate medical care if:    You have symptoms of infection, such as:  Increased pain, swelling, warmth, or redness.  Red streaks leading from the area.  Pus draining from the area.  A fever.

## 2025-06-13 NOTE — PROGRESS NOTES
Wound Description Full thickness 1/26/2018  9:40 AM   Honomu%Wound Bed 0 1/26/2018  9:40 AM   Red%Wound Bed 70 1/26/2018  9:40 AM   Yellow%Wound Bed 30 1/26/2018  9:40 AM   Black%Wound Bed 0 1/22/2018  2:56 PM   Purple%Wound Bed 0 1/22/2018  2:56 PM   Other%Wound Bed 0 1/26/2018  9:40 AM   Number of days: 326       Wound 03/06/17 Leg Right; Lower;Medial #2 venous/ lymphedema (stage 3)- combined with wound #3 (Active)   Wound Type Wound 1/26/2018  9:40 AM   Wound Venous 1/10/2018  8:44 AM   Dressing Status Intact 8/16/2017  9:04 AM   Dressing Changed Changed/New 12/4/2017 11:24 AM   Dressing/Treatment Alginate;ABD;Dry dressing 1/10/2018  8:44 AM   Wound Cleansed Rinsed/Irrigated with saline 1/26/2018  9:40 AM   Wound Length (cm) 14.2 cm 1/26/2018  9:40 AM   Wound Width (cm) 18.7 cm 1/26/2018  9:40 AM   Wound Depth (cm)  0.1 1/26/2018  9:40 AM   Calculated Wound Size (cm^2) (l*w) 265.54 cm^2 1/26/2018  9:40 AM   Change in Wound Size % (l*w) -550.83 1/26/2018  9:40 AM   Distance Tunneling (cm) 0 cm 1/26/2018  9:40 AM   Tunneling Position ___ O'Clock 0 1/22/2018  2:56 PM   Undermining Starts ___ O'Clock 0 12/1/2017 10:07 AM   Undermining Ends___ O'Clock 0 1/22/2018  2:56 PM   Undermining Maxium Distance (cm) 0 1/26/2018  9:40 AM   Wound Assessment Yellow;Slough;Red;Granulation tissue 1/26/2018  9:40 AM   Drainage Amount Large 1/26/2018  9:40 AM   Drainage Description Brown;Serosanguinous; Yellow 1/26/2018  9:40 AM   Odor Mild 1/26/2018  9:40 AM   Margins Undefined edges 1/26/2018  9:40 AM   Kiah-wound Assessment Pink; Maceration;Edema;Purple 1/26/2018  9:40 AM   Non-staged Wound Description Full thickness 1/26/2018  9:40 AM   Honomu%Wound Bed 0 1/22/2018  2:56 PM   Red%Wound Bed 70 1/26/2018  9:40 AM   Yellow%Wound Bed 30 1/26/2018  9:40 AM   Black%Wound Bed 0 1/22/2018  2:56 PM   Purple%Wound Bed 0 1/22/2018  2:56 PM   Other%Wound Bed 0 1/26/2018  9:40 AM   Number of days: 326       Percent of Wound Debrided: 50%    Total around wound. [x] Other: Use Vashe wash with dressing changes- soak vashe wash on gauze and let sit for 5 minutes. Dressings:           Wound Location: right lower leg wounds     [x] Apply Primary Dressing to wound:       [] Foam/Foam with Border(i.e Mepilex) [] Non-adherent (i.e.Mepitel)   [] Alginate with Silver(i.e. Silvercel) [x] Alginate   [] Collagen(i.e. Puracol) [] Collagen with Silver(i.e. Silvercel)    [] Hydrocolloid  [] Hydrafera Blue moistened with saline   [] MediHoney Paste/Gel [] Hydrogel      [] Santyl covered with gauze moisten with saline    [] Bactroban/Mupirocin [] Polysporin  [] Other:      Pack wound loosely with  [] Iodoform   [] Plain Packing  [] Saline \"wet to dry\"      [x] Cover and Secure with:     [] Gauze [x] ABD [] Stretch bandage roll [x] Kerlix   [] Coban [] Ace Wrap [] Cover Roll Tape    [x] Other: optilock   Avoid contact of tape with skin. [x] Change dressing:  [] Daily    [] Every Other Day [] Three times per week   [] Once a week [] Do Not Change Dressing   [x] Other: nurse visit on Monday to change and patient will change on Wednesdays         Compression:  Type: profore 4 layer wrap  ·  Multilayer Compression Wrap Applied in Clinic [x]Right Leg []Left Leg  · Do not get leg(s) with wrap wet. ·  If wraps become too tight call the center or completely remove the wrap. · Elevate leg(s) above the level of the heart when sitting. · Avoid prolonged standing in one place. [] [facility] remove wrap, cleanse affected leg(s) with soap and water, apply wound dressings as ordered above and reapply compression wraps on:         Dietary:  Important dietary reminders:  1. Increase Protein intake (i.e. Lean meats, fish, eggs, legumes, and yogurt)  2. No added salt  3. If diabetic, follow a diabetic diet and check glucose prior to meals or as instructed by your physician.         Return Appointment:  [] Wound and dressing supply provider:   [] ECF or Home Healthcare:  [x] Nurse visit: Next Monday   [x] Return Appointment: With Dr. Yasmany Metcalf   in  [facility])      Continue to use lymphedema pumps twice daily in 1 hour increments    Your nurse  is:  Imke Forrest     Electronically signed by Vinicius River RN on 1/26/2018 at 10:15 R Aileen Cardoso 8 Information: Should you experience any significant changes in your wound(s) or have questions about your wound care, please contact the [address] [place] [address] at [phone] Monday and Wednesday 8:00 am - 2:00 pm and Tuesday, Thursday and Friday from 8:00 am - 4:30 pm.   If you need help with your wound outside these hours and cannot wait until we are again available, contact your PCP or go to the hospital emergency room. PLEASE NOTE: IF YOU ARE UNABLE TO OBTAIN WOUND SUPPLIES, CONTINUE TO USE THE SUPPLIES YOU HAVE AVAILABLE UNTIL YOU ARE ABLE TO REACH US. IT IS MOST IMPORTANT TO KEEP THE WOUND COVERED AT ALL TIMES. Physician orders by:     [] Dr Jackelin Vyas [] Dr Terese Bridges    [] Dr Diana Squires     [] Dr Juliana Liz   [x] Dr Wing Denise  [] Dr Christian Irby  [] Dr Alden Jones       Physician Signature:__________________________________        The information contained in the After Visit Summary has been reviewed with me, the patient and/or responsible adult, by my health care provider(s). I had the opportunity to ask questions regarding this information.   I have elected to receive;      [] Patient unable to sign Discharge Instructions given to ECF/Transportation/POA            Electronically signed by Alden Ramirez MD on 1/26/2018 at 12:29 PM Normal rate, regular rhythm.  Heart sounds S1, S2.

## 2025-06-18 ENCOUNTER — HOSPITAL ENCOUNTER (OUTPATIENT)
Dept: WOUND CARE | Age: 60
Discharge: HOME | End: 2025-06-18
Attending: SPECIALIST
Payer: COMMERCIAL

## 2025-06-18 VITALS
DIASTOLIC BLOOD PRESSURE: 77 MMHG | HEART RATE: 72 BPM | RESPIRATION RATE: 16 BRPM | SYSTOLIC BLOOD PRESSURE: 131 MMHG | TEMPERATURE: 98 F

## 2025-06-18 DIAGNOSIS — I83.009 VENOUS ULCER (HCC): ICD-10-CM

## 2025-06-18 DIAGNOSIS — I89.0 CHRONIC ACQUIRED LYMPHEDEMA: Primary | ICD-10-CM

## 2025-06-18 DIAGNOSIS — Z91.199 NONCOMPLIANCE: ICD-10-CM

## 2025-06-18 DIAGNOSIS — I87.331 IDIOPATHIC CHRONIC VENOUS HYPERTENSION OF RIGHT LOWER EXTREMITY WITH ULCER AND INFLAMMATION (HCC): ICD-10-CM

## 2025-06-18 DIAGNOSIS — L97.909 VENOUS ULCER (HCC): ICD-10-CM

## 2025-06-18 DIAGNOSIS — I89.0 LYMPHEDEMA: ICD-10-CM

## 2025-06-18 PROCEDURE — 29581 APPL MULTLAYER CMPRN SYS LEG: CPT

## 2025-06-18 PROCEDURE — 6370000000 HC RX 637 (ALT 250 FOR IP): Performed by: SPECIALIST

## 2025-06-18 PROCEDURE — 99203 OFFICE O/P NEW LOW 30 MIN: CPT | Performed by: STUDENT IN AN ORGANIZED HEALTH CARE EDUCATION/TRAINING PROGRAM

## 2025-06-18 PROCEDURE — 11042 DBRDMT SUBQ TIS 1ST 20SQCM/<: CPT

## 2025-06-18 PROCEDURE — 97597 DBRDMT OPN WND 1ST 20 CM/<: CPT | Performed by: STUDENT IN AN ORGANIZED HEALTH CARE EDUCATION/TRAINING PROGRAM

## 2025-06-18 RX ORDER — SILVER SULFADIAZINE 10 MG/G
CREAM TOPICAL ONCE
OUTPATIENT
Start: 2025-06-18 | End: 2025-06-18

## 2025-06-18 RX ORDER — TRIAMCINOLONE ACETONIDE 1 MG/G
OINTMENT TOPICAL ONCE
OUTPATIENT
Start: 2025-06-18 | End: 2025-06-18

## 2025-06-18 RX ORDER — SODIUM CHLOR/HYPOCHLOROUS ACID 0.033 %
SOLUTION, IRRIGATION IRRIGATION ONCE
OUTPATIENT
Start: 2025-06-18 | End: 2025-06-18

## 2025-06-18 RX ORDER — LIDOCAINE HYDROCHLORIDE 40 MG/ML
SOLUTION TOPICAL ONCE
OUTPATIENT
Start: 2025-06-18 | End: 2025-06-18

## 2025-06-18 RX ORDER — BACITRACIN ZINC AND POLYMYXIN B SULFATE 500; 1000 [USP'U]/G; [USP'U]/G
OINTMENT TOPICAL ONCE
OUTPATIENT
Start: 2025-06-18 | End: 2025-06-18

## 2025-06-18 RX ORDER — NEOMYCIN/BACITRACIN/POLYMYXINB 3.5-400-5K
OINTMENT (GRAM) TOPICAL ONCE
OUTPATIENT
Start: 2025-06-18 | End: 2025-06-18

## 2025-06-18 RX ORDER — LIDOCAINE HYDROCHLORIDE 20 MG/ML
JELLY TOPICAL ONCE
OUTPATIENT
Start: 2025-06-18 | End: 2025-06-18

## 2025-06-18 RX ORDER — GINSENG 100 MG
CAPSULE ORAL ONCE
OUTPATIENT
Start: 2025-06-18 | End: 2025-06-18

## 2025-06-18 RX ORDER — LIDOCAINE 40 MG/G
CREAM TOPICAL ONCE
OUTPATIENT
Start: 2025-06-18 | End: 2025-06-18

## 2025-06-18 RX ORDER — LIDOCAINE 40 MG/G
CREAM TOPICAL ONCE
Status: COMPLETED | OUTPATIENT
Start: 2025-06-18 | End: 2025-06-18

## 2025-06-18 RX ORDER — MUPIROCIN 2 %
OINTMENT (GRAM) TOPICAL ONCE
OUTPATIENT
Start: 2025-06-18 | End: 2025-06-18

## 2025-06-18 RX ORDER — CLOBETASOL PROPIONATE 0.5 MG/G
OINTMENT TOPICAL ONCE
OUTPATIENT
Start: 2025-06-18 | End: 2025-06-18

## 2025-06-18 RX ORDER — GENTAMICIN SULFATE 1 MG/G
OINTMENT TOPICAL ONCE
OUTPATIENT
Start: 2025-06-18 | End: 2025-06-18

## 2025-06-18 RX ORDER — BETAMETHASONE DIPROPIONATE 0.05 %
OINTMENT (GRAM) TOPICAL ONCE
OUTPATIENT
Start: 2025-06-18 | End: 2025-06-18

## 2025-06-18 RX ORDER — LIDOCAINE 50 MG/G
OINTMENT TOPICAL ONCE
OUTPATIENT
Start: 2025-06-18 | End: 2025-06-18

## 2025-06-18 RX ADMIN — LIDOCAINE: 40 CREAM TOPICAL at 13:25

## 2025-06-18 ASSESSMENT — PAIN DESCRIPTION - ORIENTATION: ORIENTATION: RIGHT

## 2025-06-18 ASSESSMENT — PAIN SCALES - GENERAL: PAINLEVEL_OUTOF10: 2

## 2025-06-18 ASSESSMENT — PAIN DESCRIPTION - DESCRIPTORS: DESCRIPTORS: ACHING

## 2025-06-18 ASSESSMENT — PAIN DESCRIPTION - LOCATION: LOCATION: LEG

## 2025-06-18 ASSESSMENT — PAIN DESCRIPTION - PAIN TYPE: TYPE: CHRONIC PAIN

## 2025-06-18 NOTE — PROGRESS NOTES
1306   Tunneling Position ___ O'Clock 0 06/12/25 0835   Undermining Starts ___ O'Clock 0 06/12/25 0835   Undermining Ends___ O'Clock 0 06/12/25 0835   Undermining Maxium Distance (cm) 0 06/18/25 1306   Wound Assessment Pink/red;Granulation tissue;Slough 06/18/25 1306   Drainage Amount Moderate (25-50%) 06/18/25 1306   Drainage Description Brown 06/18/25 1306   Odor Mild 06/18/25 1306   Kiah-wound Assessment Maceration 06/18/25 1306   Margins Defined edges;Attached edges 06/18/25 1306   Wound Thickness Description not for Pressure Injury Full thickness 06/18/25 1306   Number of days: 531              Assessment:     Patient Active Problem List   Diagnosis Code    Pain and swelling of right lower leg M79.661, M79.89    Chronic acquired lymphedema I89.0    Idiopathic chronic venous hypertension of right lower extremity with ulcer and inflammation ([facility]) I87.331    Lower leg DVT (deep venous thromboembolism), chronic ([facility]) I82.5Z9    Chronic anticoagulation Z79.01    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    Localized edema R60.0    Stasis ulcer ([facility]) I83.009, L97.909    Leg ulcer, right, with unspecified severity ([facility]) L97.919    Pseudomonas infection A49.8    Fever R50.9    Lower leg DVT (deep venous thromboembolism), acute ([facility]) I82.4Z9    Venous ulcer ([facility]) I83.009, L97.909    Noncompliance Z91.199    Lymphedema I89.0    PICC (peripherally inserted central catheter) in place Z45.2       Assessment of today's active condition(s):     1. Chronic acquired lymphedema    2. Idiopathic chronic venous hypertension of right lower extremity with ulcer and inflammation ([facility])    3. Venous ulcer ([facility])    4. Noncompliance    5. Lymphedema          Discussed calf raises when standing or seated and not able to elevate.  Walking, ben and relaxing calf muscles can help with fluid management.  Try to use lymphedema pumps 1 to 2

## 2025-06-18 NOTE — PATIENT INSTRUCTIONS
Wound Care Center Physician Orders and Discharge Instructions  [facility]   [address] [place]  Telephone: [phone] FAX [phone]  NAME:  Justin Baeza  YOB: 1965  MEDICAL RECORD NUMBER:  [id number]  DATE: 6/18/2025      Return Appointment:  Return Appointment: With Dr. Patten in 1 Week(s)  [x] Return Appointment for a Wound Assessment with the nurse on:     Future Appointments   Date Time Provider Department Center   6/30/2025  8:00 AM [facility] MEDICATION MANAGEMENT [facility]       Orders Placed This Encounter   Procedures    Initiate Outpatient Wound Care Protocol         Wound care instructions:  If you smoke we ask that you refrain from smoking. Smoking inhibits wounds from healing.  When taking antibiotics take the entire prescription as ordered. Do not stop taking until medication is all gone unless otherwise instructed.   Exercise as tolerated.   Keep weight off wounds and reposition every 2 hours if applicable.  Do not get wounds wet in the bath or shower unless otherwise instructed by your physician. If your wound is on your foot or leg, you may purchase a cast bag. Please ask at the pharmacy.  Wash hands with soap and water prior to and after every dressing change.    [x]Wash wounds with: No need to wash. Leave dressing in place.    [x]Kiah wound Topical Treatments: Do not apply lotions, creams, or ointments to the skin around the wound bed unless directed as followed:   Apply around the wound:  moisturizing lotion, zinc paste- applied by Dr. Giles        [x]Wound Location: right lower leg wounds  Apply Primary Dressing to wound: polymem AG  Secondary Dressing: ABD   Avoid contact of tape with skin if possible.  When to change Dressing: DO NOT CHANGE      [x] Multilayer Compression Wrap:  Type: Applied on Right lower leg(s)  4 Layer Compression Wrap    Do not get leg(s) with wrap wet.  If wraps become too tight call the center

## 2025-06-23 ENCOUNTER — TELEPHONE (OUTPATIENT)
Dept: INTERVENTIONAL RADIOLOGY/VASCULAR | Age: 60
End: 2025-06-23

## 2025-06-23 NOTE — TELEPHONE ENCOUNTER
Attempted to call pt for scheduling IVC recan procedure. No answer and voicemail left with callback number.

## 2025-06-25 ENCOUNTER — HOSPITAL ENCOUNTER (OUTPATIENT)
Dept: WOUND CARE | Age: 60
Discharge: HOME | End: 2025-06-25
Attending: SPECIALIST
Payer: COMMERCIAL

## 2025-06-25 VITALS
DIASTOLIC BLOOD PRESSURE: 70 MMHG | RESPIRATION RATE: 16 BRPM | HEART RATE: 80 BPM | TEMPERATURE: 98 F | SYSTOLIC BLOOD PRESSURE: 130 MMHG

## 2025-06-25 PROCEDURE — 29581 APPL MULTLAYER CMPRN SYS LEG: CPT

## 2025-06-25 ASSESSMENT — PAIN DESCRIPTION - FREQUENCY: FREQUENCY: CONTINUOUS

## 2025-06-25 ASSESSMENT — PAIN DESCRIPTION - DESCRIPTORS: DESCRIPTORS: ACHING

## 2025-06-25 ASSESSMENT — PAIN DESCRIPTION - PAIN TYPE: TYPE: CHRONIC PAIN

## 2025-06-25 ASSESSMENT — PAIN DESCRIPTION - LOCATION: LOCATION: LEG

## 2025-06-25 ASSESSMENT — PAIN DESCRIPTION - ORIENTATION: ORIENTATION: RIGHT

## 2025-06-25 ASSESSMENT — PAIN SCALES - GENERAL: PAINLEVEL_OUTOF10: 2

## 2025-06-25 NOTE — PATIENT INSTRUCTIONS
Wound Care Center Physician Orders and Discharge Instructions  [facility]  [address] [facility]. [address] [place] [id number]  Telephone: [phone]      FAX [phone]    NAME:  Justin Baeza  YOB: 1965  MEDICAL RECORD NUMBER:  [id number]  DATE:  6/25/2025      Wound care:  Continue to follow the instructions and recommendations from your last doctor visit.  The dressing(s) applied is the same as your last visit. Please refer to your last discharge instruction for the information on your wound care.      If there were any changes made, please follow the instructions as written here: none    Future Appointments     Future Appointments   Date Time Provider Department Center   6/30/2025  8:00 AM [facility] MEDICATION MANAGEMENT [facility]   6/30/2025  8:15 AM Levon Patten, DO [facility] WOUND [facility]           Your nurse  is:  Ailin     Electronically signed by Kathy Gomez RN on 6/25/2025 at 5:30 PM     Wound Care Center Information: Should you experience any significant changes in your wound(s) or have questions about your wound care, please contact the [facility] at [phone]. Our hours vary so please leave a message. Please give us 24-48 hours to return your call.   If you need help with your wounds and cannot wait until we are available, contact your PCP or go to the hospital emergency room.       Physician orders by:  Dr Patten         The information contained in the After Visit Summary has been reviewed with me, the patient and/or responsible adult, by my health care provider(s).  I had the opportunity to ask questions regarding this information.  I have elected to receive;      [] Patient unable to sign Discharge Instructions. Given to ECF/Transportation/POA

## 2025-06-30 ENCOUNTER — TELEPHONE (OUTPATIENT)
Dept: PHARMACY | Age: 60
End: 2025-06-30

## 2025-06-30 ENCOUNTER — ANTI-COAG VISIT (OUTPATIENT)
Dept: PHARMACY | Age: 60
End: 2025-06-30
Payer: COMMERCIAL

## 2025-06-30 ENCOUNTER — HOSPITAL ENCOUNTER (OUTPATIENT)
Dept: WOUND CARE | Age: 60
Discharge: HOME | End: 2025-06-30
Attending: SPECIALIST
Payer: COMMERCIAL

## 2025-06-30 ENCOUNTER — PATIENT MESSAGE (OUTPATIENT)
Dept: WOUND CARE | Age: 60
End: 2025-06-30

## 2025-06-30 VITALS
HEART RATE: 65 BPM | RESPIRATION RATE: 16 BRPM | TEMPERATURE: 98 F | DIASTOLIC BLOOD PRESSURE: 94 MMHG | SYSTOLIC BLOOD PRESSURE: 147 MMHG

## 2025-06-30 DIAGNOSIS — I89.0 LYMPHEDEMA: ICD-10-CM

## 2025-06-30 DIAGNOSIS — I89.0 CHRONIC ACQUIRED LYMPHEDEMA: Primary | ICD-10-CM

## 2025-06-30 DIAGNOSIS — I83.009 VENOUS ULCER (HCC): ICD-10-CM

## 2025-06-30 DIAGNOSIS — L97.909 VENOUS ULCER (HCC): ICD-10-CM

## 2025-06-30 DIAGNOSIS — I87.331 IDIOPATHIC CHRONIC VENOUS HYPERTENSION OF RIGHT LOWER EXTREMITY WITH ULCER AND INFLAMMATION (HCC): ICD-10-CM

## 2025-06-30 DIAGNOSIS — I82.5Z1 CHRONIC VENOUS EMBOLISM AND THROMBOSIS OF DEEP VESSELS OF DISTAL END OF RIGHT LOWER EXTREMITY (HCC): Primary | ICD-10-CM

## 2025-06-30 DIAGNOSIS — Z91.199 NONCOMPLIANCE: ICD-10-CM

## 2025-06-30 LAB
INTERNATIONAL NORMALIZATION RATIO, POC: 3.1
PROTHROMBIN TIME, POC: 0

## 2025-06-30 PROCEDURE — 97597 DBRDMT OPN WND 1ST 20 CM/<: CPT

## 2025-06-30 PROCEDURE — 29581 APPL MULTLAYER CMPRN SYS LEG: CPT

## 2025-06-30 PROCEDURE — 99211 OFF/OP EST MAY X REQ PHY/QHP: CPT | Performed by: PHARMACIST

## 2025-06-30 PROCEDURE — 85610 PROTHROMBIN TIME: CPT | Performed by: PHARMACIST

## 2025-06-30 PROCEDURE — 97597 DBRDMT OPN WND 1ST 20 CM/<: CPT | Performed by: STUDENT IN AN ORGANIZED HEALTH CARE EDUCATION/TRAINING PROGRAM

## 2025-06-30 RX ORDER — BETAMETHASONE DIPROPIONATE 0.05 %
OINTMENT (GRAM) TOPICAL ONCE
OUTPATIENT
Start: 2025-06-30 | End: 2025-06-30

## 2025-06-30 RX ORDER — SODIUM CHLOR/HYPOCHLOROUS ACID 0.033 %
SOLUTION, IRRIGATION IRRIGATION ONCE
OUTPATIENT
Start: 2025-06-30 | End: 2025-06-30

## 2025-06-30 RX ORDER — LIDOCAINE 40 MG/G
CREAM TOPICAL ONCE
OUTPATIENT
Start: 2025-06-30 | End: 2025-06-30

## 2025-06-30 RX ORDER — ZINC OXIDE 13 %
1 CREAM (GRAM) TOPICAL DAILY
Qty: 90 CAPSULE | Refills: 0 | Status: SHIPPED | OUTPATIENT
Start: 2025-06-30

## 2025-06-30 RX ORDER — TRIAMCINOLONE ACETONIDE 1 MG/G
OINTMENT TOPICAL ONCE
OUTPATIENT
Start: 2025-06-30 | End: 2025-06-30

## 2025-06-30 RX ORDER — LIDOCAINE 40 MG/G
CREAM TOPICAL ONCE
Status: DISCONTINUED | OUTPATIENT
Start: 2025-06-30 | End: 2025-07-01 | Stop reason: HOSPADM

## 2025-06-30 RX ORDER — BACITRACIN ZINC AND POLYMYXIN B SULFATE 500; 1000 [USP'U]/G; [USP'U]/G
OINTMENT TOPICAL ONCE
OUTPATIENT
Start: 2025-06-30 | End: 2025-06-30

## 2025-06-30 RX ORDER — GINSENG 100 MG
CAPSULE ORAL ONCE
OUTPATIENT
Start: 2025-06-30 | End: 2025-06-30

## 2025-06-30 RX ORDER — LIDOCAINE 50 MG/G
OINTMENT TOPICAL ONCE
OUTPATIENT
Start: 2025-06-30 | End: 2025-06-30

## 2025-06-30 RX ORDER — GENTAMICIN SULFATE 1 MG/G
OINTMENT TOPICAL ONCE
OUTPATIENT
Start: 2025-06-30 | End: 2025-06-30

## 2025-06-30 RX ORDER — LIDOCAINE HYDROCHLORIDE 20 MG/ML
JELLY TOPICAL ONCE
OUTPATIENT
Start: 2025-06-30 | End: 2025-06-30

## 2025-06-30 RX ORDER — LIDOCAINE HYDROCHLORIDE 40 MG/ML
SOLUTION TOPICAL ONCE
OUTPATIENT
Start: 2025-06-30 | End: 2025-06-30

## 2025-06-30 RX ORDER — MUPIROCIN 2 %
OINTMENT (GRAM) TOPICAL ONCE
OUTPATIENT
Start: 2025-06-30 | End: 2025-06-30

## 2025-06-30 RX ORDER — CLOBETASOL PROPIONATE 0.5 MG/G
OINTMENT TOPICAL ONCE
OUTPATIENT
Start: 2025-06-30 | End: 2025-06-30

## 2025-06-30 RX ORDER — NEOMYCIN/BACITRACIN/POLYMYXINB 3.5-400-5K
OINTMENT (GRAM) TOPICAL ONCE
OUTPATIENT
Start: 2025-06-30 | End: 2025-06-30

## 2025-06-30 RX ORDER — SILVER SULFADIAZINE 10 MG/G
CREAM TOPICAL ONCE
OUTPATIENT
Start: 2025-06-30 | End: 2025-06-30

## 2025-06-30 ASSESSMENT — PAIN DESCRIPTION - PAIN TYPE: TYPE: CHRONIC PAIN

## 2025-06-30 ASSESSMENT — PAIN DESCRIPTION - FREQUENCY: FREQUENCY: CONTINUOUS

## 2025-06-30 ASSESSMENT — PAIN SCALES - GENERAL: PAINLEVEL_OUTOF10: 2

## 2025-06-30 ASSESSMENT — PAIN DESCRIPTION - DESCRIPTORS: DESCRIPTORS: ACHING

## 2025-06-30 ASSESSMENT — PAIN DESCRIPTION - LOCATION: LOCATION: LEG

## 2025-06-30 ASSESSMENT — PAIN DESCRIPTION - ORIENTATION: ORIENTATION: RIGHT

## 2025-06-30 NOTE — PROGRESS NOTES
ANTICOAGULATION SERVICE    Justin Baeza is a 60 y.o. male with PMHx significant for chronic DVT (last in Jan, 2019) who presents to clinic 6/30/2025 for anticoagulation monitoring and adjustment.  Patient has been taking warfarin for 15+ years.     Anticoagulation Indication(s):  DVT    Referring Physician:  Dr. Orozco    Goal INR Range:  2-3  Duration of Anticoagulation Therapy:  Indefinite  Time of day dose taken:  AM  Product patient has at home:  warfarin 5 mg (peach)    INR Summary                            Warfarin regimen (mg)  Date INR   A/P    Sun Mon Tue Wed Thu Fri Sat Mg/wk  6/30 3.1 Above, continue  7.5 5 7.5 7.5 7.5 5 7.5 47.5  5/5 2.4 At goal,continue     7.5 5 7.5 7.5 7.5 5 7.5 47.5  3/17 2.7 At goal,continue     7.5 5 7.5 7.5 7.5 5 7.5 47.5  3/3 1.8 At goal,continue     7.5 5 7.5 7.5 7.5 5 7.5 47.5  2/24 1.5 Below goal, held    7.5 5 7.5 7.5 7.5 5 7.5 47.5  12/30 2.7 At goal, continue   7.5 5 7.5 7.5 7.5 5 7.5 47.5  11/25 2.8 At goal, continue   7.5 5 7.5 7.5 7.5 5 7.5 47.5  10/18 2.9 At goal, continue   7.5 5 7.5 7.5 7.5 5 7.5 47.5  8/15 2.7 At goal, continue   7.5 5 7.5 7.5 7.5 5 7.5 47.5  7/3 2.4 At goal, continue   7.5 5 7.5 7.5 7.5 5 7.5 47.5  3/14 2.6 At goal, continue   7.5 5 7.5 7.5 7.5 5 7.5 47.5  2/12 2.2 At goal, continue   7.5 5 7.5 7.5 7.5 5 7.5 47.5  2/8 1.82 Per TJH   2/5 3.9 Above goal, hold   7.5 5 0/7.5 5/7.5 7.5 5 7.5 47.5  1/17 3.4 Above goal, continue  7.5 5 7.5 7.5 7.5 5 7.5 47.5  11/22 2.7 At goal, continue  7.5 5 7.5 7.5 7.5 5 7.5 47.5  10/11 2.9 At goal, continue  7.5 5 7.5 7.5 7.5 5 7.5 47.5  8/28 2.7  At goal, continue  7.5 5 7.5 7.5 7.5 5 7.5 47.5  8/2 2.37 Per lab    7/7 2.4  At goal, continue  7.5 5 7.5 7.5 7.5 5 7.5 47.5  5/26 2.7  At goal, continue  7.5 5 7.5 7.5 7.5 5 7.5 47.5  4/28 3.2 Above goal, continue  7.5 5 7.5 7.5 7.5 5 7.5 47.5  2/16 2.9 At goal, continue  7.5 5 7.5 7.5 7.5 5 7.5 47.5  1/9 2.9 At goal, continue  7.5 5 7.5 7.5 7.5 5 7.5 47.5  12/5 2.5 At goal,

## 2025-06-30 NOTE — PATIENT INSTRUCTIONS
Wound Care Center Physician Orders and Discharge Instructions  [facility]   [address] [place]  Telephone: [phone] FAX [phone]  NAME:  Justin Baeza  YOB: 1965  MEDICAL RECORD NUMBER:  [id number]  DATE: 6/30/2025      Return Appointment:  Return Appointment: With Dr. Patten in 1 Week(s)  [] Return Appointment for a Wound Assessment with the nurse on:     Future Appointments   Date Time Provider Department Center   8/25/2025  8:00 AM [facility] MEDICATION MANAGEMENT [facility]       Orders Placed This Encounter   Procedures    Initiate Outpatient Wound Care Protocol     **Add a probiotic once a day**      Wound care instructions:  If you smoke we ask that you refrain from smoking. Smoking inhibits wounds from healing.  When taking antibiotics take the entire prescription as ordered. Do not stop taking until medication is all gone unless otherwise instructed.   Exercise as tolerated.   Keep weight off wounds and reposition every 2 hours if applicable.  Do not get wounds wet in the bath or shower unless otherwise instructed by your physician. If your wound is on your foot or leg, you may purchase a cast bag. Please ask at the pharmacy.  Wash hands with soap and water prior to and after every dressing change.    [x]Wash wounds with: No need to wash. Leave dressing in place.    [x]Kiah wound Topical Treatments: Do not apply lotions, creams, or ointments to the skin around the wound bed unless directed as followed:   Apply around the wound:  moisturizing lotion, zinc paste        [x]Wound Location: right lower leg wounds  Apply Primary Dressing to wound: polymem AG  Secondary Dressing: ABD   Avoid contact of tape with skin if possible.  When to change Dressing: DO NOT CHANGE      [x] Multilayer Compression Wrap:  Type: Applied on Right lower leg(s)  4 Layer Compression Wrap    Do not get leg(s) with wrap wet.  If wraps become too tight call the

## 2025-06-30 NOTE — PROGRESS NOTES
Multilayer Compression Wrap   (Not Unna) Below the Knee    NAME:  Justin Baeza  YOB: 1965  MEDICAL RECORD NUMBER:  [id number]  DATE:  6/30/2025       Removed old Multilayer wrap if present and washed leg with mild soap/water.   Applied moisturizing agent to dry skin as needed.    Applied primary and secondary dressing as ordered    Applied multilayered dressing below the knee to Right lower leg(s)  (4 Layer Compression Wrap ) .    Instructed patient/caregiver not to remove dressing and to keep it clean and dry.    Instructed patient/caregiver on complications to report to provider, such as pain, numbness in toes, heavy drainage, and slippage of dressing.    Instructed patient on purpose of compression dressing and on activity and exercise recommendations.   Applied per   Guidelines    Electronically signed by Yesenia Hawkins RN on 6/30/25 at 9:08 AM EDT      
prescription as ordered. Do not stop taking until medication is all gone unless otherwise instructed.   Exercise as tolerated.   Keep weight off wounds and reposition every 2 hours if applicable.  Do not get wounds wet in the bath or shower unless otherwise instructed by your physician. If your wound is on your foot or leg, you may purchase a cast bag. Please ask at the pharmacy.  Wash hands with soap and water prior to and after every dressing change.    [x]Wash wounds with: No need to wash. Leave dressing in place.    [x]Kiah wound Topical Treatments: Do not apply lotions, creams, or ointments to the skin around the wound bed unless directed as followed:   Apply around the wound:  moisturizing lotion, zinc paste- applied by Dr. Giles        [x]Wound Location: right lower leg wounds  Apply Primary Dressing to wound: polymem AG  Secondary Dressing: ABD   Avoid contact of tape with skin if possible.  When to change Dressing: DO NOT CHANGE      [x] Multilayer Compression Wrap:  Type: Applied on Right lower leg(s)  4 Layer Compression Wrap    Do not get leg(s) with wrap wet.  If wraps become too tight call the center or completely remove the wrap.   Elevate leg(s) above the level of the heart when sitting.  Avoid prolonged standing in one place.   Applied in Clinic on 6/30/2025  The Goal of this therapy is to reduce edema and get into long term compression garments to control venous insufficiency, lymphedema and reduce occurrence of venous ulcers    [x] Edema Control: 30-40mmHG  Apply: Compression Stocking on the Left leg  Apply every morning immediately when getting up. Remove every night before going to bed unless instructed otherwise     Elevate leg(s) above the level of the heart for 30 minutes 4-5 times a day and/or when sitting.  Avoid prolonged standing in one place.    [x] Lymphedema Therapy:  Wear Lymphedema pumps twice a day at settings prescribed by your physician.   Avoid prolonged standing in one

## 2025-07-07 ENCOUNTER — HOSPITAL ENCOUNTER (OUTPATIENT)
Dept: WOUND CARE | Age: 60
Discharge: HOME | End: 2025-07-07
Attending: SPECIALIST
Payer: COMMERCIAL

## 2025-07-07 VITALS
DIASTOLIC BLOOD PRESSURE: 85 MMHG | TEMPERATURE: 98 F | RESPIRATION RATE: 16 BRPM | SYSTOLIC BLOOD PRESSURE: 137 MMHG | HEART RATE: 65 BPM

## 2025-07-07 DIAGNOSIS — I89.0 CHRONIC ACQUIRED LYMPHEDEMA: Primary | ICD-10-CM

## 2025-07-07 DIAGNOSIS — I89.0 LYMPHEDEMA: ICD-10-CM

## 2025-07-07 DIAGNOSIS — I83.009 VENOUS ULCER (HCC): ICD-10-CM

## 2025-07-07 DIAGNOSIS — I87.331 IDIOPATHIC CHRONIC VENOUS HYPERTENSION OF RIGHT LOWER EXTREMITY WITH ULCER AND INFLAMMATION (HCC): ICD-10-CM

## 2025-07-07 DIAGNOSIS — L97.909 VENOUS ULCER (HCC): ICD-10-CM

## 2025-07-07 DIAGNOSIS — Z91.199 NONCOMPLIANCE: ICD-10-CM

## 2025-07-07 PROCEDURE — 97597 DBRDMT OPN WND 1ST 20 CM/<: CPT

## 2025-07-07 PROCEDURE — 29581 APPL MULTLAYER CMPRN SYS LEG: CPT

## 2025-07-07 PROCEDURE — 97597 DBRDMT OPN WND 1ST 20 CM/<: CPT | Performed by: STUDENT IN AN ORGANIZED HEALTH CARE EDUCATION/TRAINING PROGRAM

## 2025-07-07 PROCEDURE — 6370000000 HC RX 637 (ALT 250 FOR IP): Performed by: SPECIALIST

## 2025-07-07 PROCEDURE — 99213 OFFICE O/P EST LOW 20 MIN: CPT | Performed by: STUDENT IN AN ORGANIZED HEALTH CARE EDUCATION/TRAINING PROGRAM

## 2025-07-07 RX ORDER — BACITRACIN ZINC AND POLYMYXIN B SULFATE 500; 1000 [USP'U]/G; [USP'U]/G
OINTMENT TOPICAL ONCE
OUTPATIENT
Start: 2025-07-07 | End: 2025-07-07

## 2025-07-07 RX ORDER — BETAMETHASONE DIPROPIONATE 0.05 %
OINTMENT (GRAM) TOPICAL ONCE
OUTPATIENT
Start: 2025-07-07 | End: 2025-07-07

## 2025-07-07 RX ORDER — LIDOCAINE HYDROCHLORIDE 20 MG/ML
JELLY TOPICAL ONCE
OUTPATIENT
Start: 2025-07-07 | End: 2025-07-07

## 2025-07-07 RX ORDER — LIDOCAINE HYDROCHLORIDE 40 MG/ML
SOLUTION TOPICAL ONCE
OUTPATIENT
Start: 2025-07-07 | End: 2025-07-07

## 2025-07-07 RX ORDER — SODIUM CHLOR/HYPOCHLOROUS ACID 0.033 %
SOLUTION, IRRIGATION IRRIGATION ONCE
OUTPATIENT
Start: 2025-07-07 | End: 2025-07-07

## 2025-07-07 RX ORDER — GENTAMICIN SULFATE 1 MG/G
OINTMENT TOPICAL ONCE
OUTPATIENT
Start: 2025-07-07 | End: 2025-07-07

## 2025-07-07 RX ORDER — LIDOCAINE 40 MG/G
CREAM TOPICAL ONCE
Status: COMPLETED | OUTPATIENT
Start: 2025-07-07 | End: 2025-07-07

## 2025-07-07 RX ORDER — TRIAMCINOLONE ACETONIDE 1 MG/G
OINTMENT TOPICAL ONCE
OUTPATIENT
Start: 2025-07-07 | End: 2025-07-07

## 2025-07-07 RX ORDER — LIDOCAINE 40 MG/G
CREAM TOPICAL ONCE
OUTPATIENT
Start: 2025-07-07 | End: 2025-07-07

## 2025-07-07 RX ORDER — LIDOCAINE 50 MG/G
OINTMENT TOPICAL ONCE
OUTPATIENT
Start: 2025-07-07 | End: 2025-07-07

## 2025-07-07 RX ORDER — CLOBETASOL PROPIONATE 0.5 MG/G
OINTMENT TOPICAL ONCE
OUTPATIENT
Start: 2025-07-07 | End: 2025-07-07

## 2025-07-07 RX ORDER — MUPIROCIN 2 %
OINTMENT (GRAM) TOPICAL ONCE
OUTPATIENT
Start: 2025-07-07 | End: 2025-07-07

## 2025-07-07 RX ORDER — SILVER SULFADIAZINE 10 MG/G
CREAM TOPICAL ONCE
OUTPATIENT
Start: 2025-07-07 | End: 2025-07-07

## 2025-07-07 RX ORDER — NEOMYCIN/BACITRACIN/POLYMYXINB 3.5-400-5K
OINTMENT (GRAM) TOPICAL ONCE
OUTPATIENT
Start: 2025-07-07 | End: 2025-07-07

## 2025-07-07 RX ORDER — GINSENG 100 MG
CAPSULE ORAL ONCE
OUTPATIENT
Start: 2025-07-07 | End: 2025-07-07

## 2025-07-07 RX ADMIN — LIDOCAINE: 40 CREAM TOPICAL at 08:36

## 2025-07-07 ASSESSMENT — PAIN DESCRIPTION - ONSET: ONSET: ON-GOING

## 2025-07-07 ASSESSMENT — PAIN DESCRIPTION - FREQUENCY: FREQUENCY: CONTINUOUS

## 2025-07-07 ASSESSMENT — PAIN DESCRIPTION - DESCRIPTORS: DESCRIPTORS: ACHING

## 2025-07-07 ASSESSMENT — PAIN SCALES - GENERAL: PAINLEVEL_OUTOF10: 1

## 2025-07-07 ASSESSMENT — PAIN DESCRIPTION - ORIENTATION: ORIENTATION: RIGHT

## 2025-07-07 ASSESSMENT — PAIN DESCRIPTION - LOCATION: LOCATION: LEG

## 2025-07-07 ASSESSMENT — PAIN DESCRIPTION - PAIN TYPE: TYPE: CHRONIC PAIN

## 2025-07-07 NOTE — PROGRESS NOTES
[facility] Progress Note    Justin Baeza     : 1965    DATE OF VISIT:  2025    Subjective:     Justin Baeza is a 60 y.o. male who has (a) lymphedema ulcer(s) located on the right lower extremity.  Finished the Augmentin yesterday, no side effects, taking probiotics.  Trying to elevate or do calf raises when he can to help manage fluid.  Not able to use lymphedema pumps everyday.     Additional ulcer(s) noted? no.     Past Medical History:   Diagnosis Date    Difficult intravenous access     not bedside candidate for PICC lines both arms no thread, needs IR or tunneled    DVT (deep venous thrombosis) (HCC)     Hx of blood clots     Pain and swelling of right lower leg        His current medication list consists of Compression Stockings, Probiotic Daily, amoxicillin-clavulanate, ibuprofen, and warfarin.    Allergies: Patient has no known allergies.    Objective:     Vitals:    25 0817   BP: 137/85   Pulse: 65   Resp: 16   Temp: 98 °F (36.7 °C)   TempSrc: Temporal       Physical  Constitutional:  NAD, obese  Respiratory:  Normal effort  Psychiatric:  Mood and affect appropriate  Lymphatic:  Trace edema, without cellulitis  Kiah-ulcer skin: hemosiderin changes and lymphedema changes.  Ulcer(s): granular, slough, and fibrin. Photos also saved in electronic chart.    Today's wound measurements, per RN documentation:  Wound 22 Leg Right;Lower;Medial;Proximal #1-Wound Length (cm): 0.5 cm  Wound 22 Leg Right;Anterior;Lower #2-Wound Length (cm): 0.7 cm  Wound 24 Ankle Right;Medial #4-Wound Length (cm): 2.1 cm  Wound 22 Leg Right;Posterior;Lower #3 cluster-Wound Length (cm): 1.7 cm  Wound 25 Leg Right;Lower;Lateral #5-Wound Length (cm): 1 cm    Wound 22 Leg Right;Lower;Medial;Proximal #1-Wound Width (cm): 1.7 cm  Wound 22 Leg Right;Anterior;Lower #2-Wound Width (cm): 2.2 cm  Wound 24 Ankle Right;Medial #4-Wound Width (cm): 0.7

## 2025-07-07 NOTE — PROGRESS NOTES
Multilayer Compression Wrap   (Not Unna) Below the Knee    NAME:  Justin Baeza  YOB: 1965  MEDICAL RECORD NUMBER:  [id number]  DATE:  7/7/2025       Removed old Multilayer wrap if present and washed leg with mild soap/water.   Applied moisturizing agent to dry skin as needed.    Applied primary and secondary dressing as ordered    Applied multilayered dressing below the knee to Right lower leg(s)  (4 Layer Compression Wrap ) .    Instructed patient/caregiver not to remove dressing and to keep it clean and dry.    Instructed patient/caregiver on complications to report to provider, such as pain, numbness in toes, heavy drainage, and slippage of dressing.    Instructed patient on purpose of compression dressing and on activity and exercise recommendations.   Applied per   Guidelines    Electronically signed by Kathy Gomez RN on 7/7/25 at 9:28 AM EDT

## 2025-07-07 NOTE — PATIENT INSTRUCTIONS
Wound Care Center Physician Orders and Discharge Instructions  [facility]   [address] [place]  Telephone: [phone] FAX [phone]  NAME:  Justin Baeza  YOB: 1965  MEDICAL RECORD NUMBER:  [id number]  DATE: 7/7/2025      Return Appointment:  Return Appointment: With Dr. Patten in 1 Week(s)  [] Return Appointment for a Wound Assessment with the nurse on:     Future Appointments   Date Time Provider Department Center   8/25/2025  8:00 AM [facility] MEDICATION MANAGEMENT [facility]       Orders Placed This Encounter   Procedures    Initiate Outpatient Wound Care Protocol     Continue with probiotics       Wound care instructions:  If you smoke we ask that you refrain from smoking. Smoking inhibits wounds from healing.  When taking antibiotics take the entire prescription as ordered. Do not stop taking until medication is all gone unless otherwise instructed.   Exercise as tolerated.   Keep weight off wounds and reposition every 2 hours if applicable.  Do not get wounds wet in the bath or shower unless otherwise instructed by your physician. If your wound is on your foot or leg, you may purchase a cast bag. Please ask at the pharmacy.  Wash hands with soap and water prior to and after every dressing change.    [x]Wash wounds with: No need to wash. Leave dressing in place.    [x]Kiah wound Topical Treatments: Do not apply lotions, creams, or ointments to the skin around the wound bed unless directed as followed:   Apply around the wound:  moisturizing lotion, zinc paste        [x]Wound Location: right lower leg wounds  Apply Primary Dressing to wound: polymem AG  Secondary Dressing: ABD   Avoid contact of tape with skin if possible.  When to change Dressing: DO NOT CHANGE      [x] Multilayer Compression Wrap:  Type: Applied on Right lower leg(s)  4 Layer Compression Wrap    Do not get leg(s) with wrap wet.  If wraps become too tight call the center

## 2025-07-14 ENCOUNTER — HOSPITAL ENCOUNTER (OUTPATIENT)
Dept: WOUND CARE | Age: 60
Discharge: HOME | End: 2025-07-14
Attending: SPECIALIST
Payer: COMMERCIAL

## 2025-07-14 VITALS
SYSTOLIC BLOOD PRESSURE: 160 MMHG | DIASTOLIC BLOOD PRESSURE: 80 MMHG | RESPIRATION RATE: 16 BRPM | HEART RATE: 65 BPM | TEMPERATURE: 96.8 F

## 2025-07-14 DIAGNOSIS — I87.331 IDIOPATHIC CHRONIC VENOUS HYPERTENSION OF RIGHT LOWER EXTREMITY WITH ULCER AND INFLAMMATION (HCC): ICD-10-CM

## 2025-07-14 DIAGNOSIS — I89.0 LYMPHEDEMA: ICD-10-CM

## 2025-07-14 DIAGNOSIS — Z91.199 NONCOMPLIANCE: ICD-10-CM

## 2025-07-14 DIAGNOSIS — L97.909 VENOUS ULCER (HCC): ICD-10-CM

## 2025-07-14 DIAGNOSIS — I83.009 VENOUS ULCER (HCC): ICD-10-CM

## 2025-07-14 DIAGNOSIS — I89.0 CHRONIC ACQUIRED LYMPHEDEMA: Primary | ICD-10-CM

## 2025-07-14 PROCEDURE — 99214 OFFICE O/P EST MOD 30 MIN: CPT | Performed by: STUDENT IN AN ORGANIZED HEALTH CARE EDUCATION/TRAINING PROGRAM

## 2025-07-14 PROCEDURE — 97597 DBRDMT OPN WND 1ST 20 CM/<: CPT

## 2025-07-14 PROCEDURE — 97597 DBRDMT OPN WND 1ST 20 CM/<: CPT | Performed by: STUDENT IN AN ORGANIZED HEALTH CARE EDUCATION/TRAINING PROGRAM

## 2025-07-14 PROCEDURE — 29581 APPL MULTLAYER CMPRN SYS LEG: CPT

## 2025-07-14 RX ORDER — MUPIROCIN 2 %
OINTMENT (GRAM) TOPICAL ONCE
OUTPATIENT
Start: 2025-07-14 | End: 2025-07-14

## 2025-07-14 RX ORDER — TRIAMCINOLONE ACETONIDE 1 MG/G
OINTMENT TOPICAL ONCE
OUTPATIENT
Start: 2025-07-14 | End: 2025-07-14

## 2025-07-14 RX ORDER — GINSENG 100 MG
CAPSULE ORAL ONCE
OUTPATIENT
Start: 2025-07-14 | End: 2025-07-14

## 2025-07-14 RX ORDER — SILVER SULFADIAZINE 10 MG/G
CREAM TOPICAL ONCE
OUTPATIENT
Start: 2025-07-14 | End: 2025-07-14

## 2025-07-14 RX ORDER — LIDOCAINE 50 MG/G
OINTMENT TOPICAL ONCE
OUTPATIENT
Start: 2025-07-14 | End: 2025-07-14

## 2025-07-14 RX ORDER — LIDOCAINE HYDROCHLORIDE 20 MG/ML
JELLY TOPICAL ONCE
OUTPATIENT
Start: 2025-07-14 | End: 2025-07-14

## 2025-07-14 RX ORDER — NEOMYCIN/BACITRACIN/POLYMYXINB 3.5-400-5K
OINTMENT (GRAM) TOPICAL ONCE
OUTPATIENT
Start: 2025-07-14 | End: 2025-07-14

## 2025-07-14 RX ORDER — GENTAMICIN SULFATE 1 MG/G
OINTMENT TOPICAL ONCE
OUTPATIENT
Start: 2025-07-14 | End: 2025-07-14

## 2025-07-14 RX ORDER — LIDOCAINE HYDROCHLORIDE 40 MG/ML
SOLUTION TOPICAL ONCE
OUTPATIENT
Start: 2025-07-14 | End: 2025-07-14

## 2025-07-14 RX ORDER — BETAMETHASONE DIPROPIONATE 0.05 %
OINTMENT (GRAM) TOPICAL ONCE
OUTPATIENT
Start: 2025-07-14 | End: 2025-07-14

## 2025-07-14 RX ORDER — BACITRACIN ZINC AND POLYMYXIN B SULFATE 500; 1000 [USP'U]/G; [USP'U]/G
OINTMENT TOPICAL ONCE
OUTPATIENT
Start: 2025-07-14 | End: 2025-07-14

## 2025-07-14 RX ORDER — LIDOCAINE 40 MG/G
CREAM TOPICAL ONCE
Status: DISCONTINUED | OUTPATIENT
Start: 2025-07-14 | End: 2025-07-15 | Stop reason: HOSPADM

## 2025-07-14 RX ORDER — CLOBETASOL PROPIONATE 0.5 MG/G
OINTMENT TOPICAL ONCE
OUTPATIENT
Start: 2025-07-14 | End: 2025-07-14

## 2025-07-14 RX ORDER — SODIUM CHLOR/HYPOCHLOROUS ACID 0.033 %
SOLUTION, IRRIGATION IRRIGATION ONCE
OUTPATIENT
Start: 2025-07-14 | End: 2025-07-14

## 2025-07-14 RX ORDER — LIDOCAINE 40 MG/G
CREAM TOPICAL ONCE
OUTPATIENT
Start: 2025-07-14 | End: 2025-07-14

## 2025-07-14 ASSESSMENT — PAIN DESCRIPTION - DESCRIPTORS: DESCRIPTORS: ACHING

## 2025-07-14 ASSESSMENT — PAIN DESCRIPTION - PAIN TYPE: TYPE: CHRONIC PAIN

## 2025-07-14 ASSESSMENT — PAIN DESCRIPTION - LOCATION: LOCATION: LEG

## 2025-07-14 ASSESSMENT — PAIN DESCRIPTION - ORIENTATION: ORIENTATION: RIGHT

## 2025-07-14 ASSESSMENT — PAIN DESCRIPTION - FREQUENCY: FREQUENCY: CONTINUOUS

## 2025-07-14 ASSESSMENT — PAIN SCALES - GENERAL: PAINLEVEL_OUTOF10: 1

## 2025-07-14 NOTE — PROGRESS NOTES
Multilayer Compression Wrap   (Not Unna) Below the Knee    NAME:  Justin Baeza  YOB: 1965  MEDICAL RECORD NUMBER:  [id number]  DATE:  7/14/2025       Removed old Multilayer wrap if present and washed leg with mild soap/water.   Applied moisturizing agent to dry skin as needed.    Applied primary and secondary dressing as ordered    Applied multilayered dressing below the knee to Right lower leg(s)  (4 Layer Compression Wrap ) .    Instructed patient/caregiver not to remove dressing and to keep it clean and dry.    Instructed patient/caregiver on complications to report to provider, such as pain, numbness in toes, heavy drainage, and slippage of dressing.    Instructed patient on purpose of compression dressing and on activity and exercise recommendations.   Applied per   Guidelines    Electronically signed by Kathy Gomez RN on 7/14/25 at 9:37 AM EDT

## 2025-07-14 NOTE — PROGRESS NOTES
[facility] Progress Note    Justin Baeza     : 1965    DATE OF VISIT:  2025    Subjective:     Justin Baeza is a 60 y.o. male who has (a) cluster of lymphedema ulcer(s) located on the right lower extremity.  Still only using his lymphedema pumps 3 times weekly.  Trying to elevate when he can.  Doing calf raises or calf contractions when he is not able to elevate.  Trying to increase his protein intake.  Otherwise negative pertinent review of systems.    Additional ulcer(s) noted? no.    Past Medical History:   Diagnosis Date    Difficult intravenous access     not bedside candidate for PICC lines both arms no thread, needs IR or tunneled    DVT (deep venous thrombosis) (HCC)     Hx of blood clots     Pain and swelling of right lower leg        His current medication list consists of Compression Stockings, Probiotic Daily, ibuprofen, and warfarin.    Allergies: Patient has no known allergies.    Objective:     Vitals:    25 0822   BP: (!) 160/80   Pulse: 65   Resp: 16   Temp: 96.8 °F (36 °C)   TempSrc: Temporal       Physical  Constitutional:  NAD, obese  Respiratory:  Normal effort  Psychiatric:  Mood and affect appropriate  Lymphatic: 1+ edema, without cellulitis  Kiah-ulcer skin: Lymphedema changes.  Ulcer(s): Granular, fibrin, pink, yellow. Photos also saved in electronic chart.    Today's wound measurements, per RN documentation:  Wound 25 Leg Right;Lower;Lateral #5-Wound Length (cm): 3.9 cm  Wound 24 Ankle Right;Medial #4-Wound Length (cm): 1.6 cm  Wound 22 Leg Right;Lower;Medial;Proximal #1-Wound Length (cm): 4 cm  Wound 22 Leg Right;Anterior;Lower #2-Wound Length (cm): 0.5 cm  Wound 22 Leg Right;Posterior;Lower #3 cluster-Wound Length (cm): 1 cm    Wound 25 Leg Right;Lower;Lateral #5-Wound Width (cm): 1 cm  Wound 24 Ankle Right;Medial #4-Wound Width (cm): 0.8 cm  Wound 22 Leg Right;Lower;Medial;Proximal

## 2025-07-14 NOTE — PATIENT INSTRUCTIONS
Wound Care Center Physician Orders and Discharge Instructions  [facility]   [address] [place]  Telephone: [phone] FAX [phone]  NAME:  Justin Baeza  YOB: 1965  MEDICAL RECORD NUMBER:  [id number]  DATE: 7/14/2025      Return Appointment:  Return Appointment: With Dr. Patten in 2 Week(s)  [x] Return Appointment for a Wound Assessment with the nurse on: Tuesday    Future Appointments   Date Time Provider Department Center   8/25/2025  8:00 AM [facility] MEDICATION MANAGEMENT [facility]       Orders Placed This Encounter   Procedures    Initiate Outpatient Wound Care Protocol     Continue with probiotics       Wound care instructions:  If you smoke we ask that you refrain from smoking. Smoking inhibits wounds from healing.  When taking antibiotics take the entire prescription as ordered. Do not stop taking until medication is all gone unless otherwise instructed.   Exercise as tolerated.   Keep weight off wounds and reposition every 2 hours if applicable.  Do not get wounds wet in the bath or shower unless otherwise instructed by your physician. If your wound is on your foot or leg, you may purchase a cast bag. Please ask at the pharmacy.  Wash hands with soap and water prior to and after every dressing change.    [x]Wash wounds with: No need to wash. Leave dressing in place.    [x]Kiah wound Topical Treatments: Do not apply lotions, creams, or ointments to the skin around the wound bed unless directed as followed:   Apply around the wound:  moisturizing lotion, zinc paste        [x]Wound Location: right lower leg wounds  Apply Primary Dressing to wound: polymem AG  Secondary Dressing: ABD   Avoid contact of tape with skin if possible.  When to change Dressing: DO NOT CHANGE      [x] Multilayer Compression Wrap:  Type: Applied on Right lower leg(s)  4 Layer Compression Wrap    Do not get leg(s) with wrap wet.  If wraps become too tight call

## 2025-07-16 ENCOUNTER — TELEPHONE (OUTPATIENT)
Dept: INTERVENTIONAL RADIOLOGY/VASCULAR | Age: 60
End: 2025-07-16

## 2025-07-16 NOTE — TELEPHONE ENCOUNTER
Attempted to call pt regarding IR consult from last month for endoleak procedure, unable to reach pt. Message left with callback number ([phone]).

## 2025-07-22 ENCOUNTER — APPOINTMENT (OUTPATIENT)
Dept: WOUND CARE | Age: 60
End: 2025-07-22
Attending: SPECIALIST
Payer: COMMERCIAL

## 2025-07-28 ENCOUNTER — HOSPITAL ENCOUNTER (OUTPATIENT)
Dept: WOUND CARE | Age: 60
Discharge: HOME | End: 2025-07-28
Attending: SPECIALIST
Payer: COMMERCIAL

## 2025-07-28 VITALS
SYSTOLIC BLOOD PRESSURE: 144 MMHG | DIASTOLIC BLOOD PRESSURE: 82 MMHG | TEMPERATURE: 97.5 F | HEART RATE: 71 BPM | RESPIRATION RATE: 16 BRPM

## 2025-07-28 DIAGNOSIS — I89.0 LYMPHEDEMA: Primary | ICD-10-CM

## 2025-07-28 DIAGNOSIS — I87.331 IDIOPATHIC CHRONIC VENOUS HYPERTENSION OF RIGHT LOWER EXTREMITY WITH ULCER AND INFLAMMATION (HCC): ICD-10-CM

## 2025-07-28 DIAGNOSIS — L97.909 VENOUS ULCER (HCC): ICD-10-CM

## 2025-07-28 DIAGNOSIS — I83.009 VENOUS ULCER (HCC): ICD-10-CM

## 2025-07-28 PROCEDURE — 11042 DBRDMT SUBQ TIS 1ST 20SQCM/<: CPT

## 2025-07-28 PROCEDURE — 97597 DBRDMT OPN WND 1ST 20 CM/<: CPT

## 2025-07-28 PROCEDURE — 11042 DBRDMT SUBQ TIS 1ST 20SQCM/<: CPT | Performed by: STUDENT IN AN ORGANIZED HEALTH CARE EDUCATION/TRAINING PROGRAM

## 2025-07-28 PROCEDURE — 29581 APPL MULTLAYER CMPRN SYS LEG: CPT

## 2025-07-28 PROCEDURE — 99213 OFFICE O/P EST LOW 20 MIN: CPT | Performed by: STUDENT IN AN ORGANIZED HEALTH CARE EDUCATION/TRAINING PROGRAM

## 2025-07-28 RX ORDER — GINSENG 100 MG
CAPSULE ORAL ONCE
OUTPATIENT
Start: 2025-07-28 | End: 2025-07-28

## 2025-07-28 RX ORDER — CLOBETASOL PROPIONATE 0.5 MG/G
OINTMENT TOPICAL ONCE
OUTPATIENT
Start: 2025-07-28 | End: 2025-07-28

## 2025-07-28 RX ORDER — GENTAMICIN SULFATE 1 MG/G
OINTMENT TOPICAL ONCE
OUTPATIENT
Start: 2025-07-28 | End: 2025-07-28

## 2025-07-28 RX ORDER — SODIUM CHLOR/HYPOCHLOROUS ACID 0.033 %
SOLUTION, IRRIGATION IRRIGATION ONCE
OUTPATIENT
Start: 2025-07-28 | End: 2025-07-28

## 2025-07-28 RX ORDER — LIDOCAINE HYDROCHLORIDE 20 MG/ML
JELLY TOPICAL ONCE
OUTPATIENT
Start: 2025-07-28 | End: 2025-07-28

## 2025-07-28 RX ORDER — NEOMYCIN/BACITRACIN/POLYMYXINB 3.5-400-5K
OINTMENT (GRAM) TOPICAL ONCE
OUTPATIENT
Start: 2025-07-28 | End: 2025-07-28

## 2025-07-28 RX ORDER — BETAMETHASONE DIPROPIONATE 0.05 %
OINTMENT (GRAM) TOPICAL ONCE
OUTPATIENT
Start: 2025-07-28 | End: 2025-07-28

## 2025-07-28 RX ORDER — LIDOCAINE 50 MG/G
OINTMENT TOPICAL ONCE
OUTPATIENT
Start: 2025-07-28 | End: 2025-07-28

## 2025-07-28 RX ORDER — TRIAMCINOLONE ACETONIDE 1 MG/G
OINTMENT TOPICAL ONCE
OUTPATIENT
Start: 2025-07-28 | End: 2025-07-28

## 2025-07-28 RX ORDER — LIDOCAINE 40 MG/G
CREAM TOPICAL ONCE
OUTPATIENT
Start: 2025-07-28 | End: 2025-07-28

## 2025-07-28 RX ORDER — LIDOCAINE HYDROCHLORIDE 40 MG/ML
SOLUTION TOPICAL ONCE
OUTPATIENT
Start: 2025-07-28 | End: 2025-07-28

## 2025-07-28 RX ORDER — MUPIROCIN 2 %
OINTMENT (GRAM) TOPICAL ONCE
OUTPATIENT
Start: 2025-07-28 | End: 2025-07-28

## 2025-07-28 RX ORDER — SILVER SULFADIAZINE 10 MG/G
CREAM TOPICAL ONCE
OUTPATIENT
Start: 2025-07-28 | End: 2025-07-28

## 2025-07-28 RX ORDER — BACITRACIN ZINC AND POLYMYXIN B SULFATE 500; 1000 [USP'U]/G; [USP'U]/G
OINTMENT TOPICAL ONCE
OUTPATIENT
Start: 2025-07-28 | End: 2025-07-28

## 2025-07-28 ASSESSMENT — PAIN DESCRIPTION - ORIENTATION: ORIENTATION: RIGHT

## 2025-07-28 ASSESSMENT — PAIN SCALES - GENERAL: PAINLEVEL_OUTOF10: 1

## 2025-07-28 ASSESSMENT — PAIN DESCRIPTION - LOCATION: LOCATION: LEG

## 2025-07-28 ASSESSMENT — PAIN DESCRIPTION - DESCRIPTORS: DESCRIPTORS: ACHING

## 2025-07-28 NOTE — PROGRESS NOTES
Multilayer Compression Wrap   (Not Unna) Below the Knee    NAME:  Justin Baeza  YOB: 1965  MEDICAL RECORD NUMBER:  [id number]  DATE:  7/28/2025       Removed old Multilayer wrap if present and washed leg with mild soap/water.   Applied moisturizing agent to dry skin as needed.    Applied primary and secondary dressing as ordered    Applied multilayered dressing below the knee to Right lower leg(s)  (4 Layer Compression Wrap ) .    Instructed patient/caregiver not to remove dressing and to keep it clean and dry.    Instructed patient/caregiver on complications to report to provider, such as pain, numbness in toes, heavy drainage, and slippage of dressing.    Instructed patient on purpose of compression dressing and on activity and exercise recommendations.   Applied per   Guidelines    Electronically signed by Kathy Gomez RN on 7/28/25 at 9:03 AM EDT

## 2025-07-28 NOTE — PROGRESS NOTES
[facility] Progress Note    Justin Baeza     : 1965    DATE OF VISIT:  2025    Subjective:     Justin Baeza is a 60 y.o. male who has (a) venous and lymphedema ulcer(s) located on the right lower extremity.  Doing calf raises when his legs become painful.  Only using lymphedema pumps a few days a week.  Has not called to schedule an endoleak procedure evaluation with IR.  Reports he is waiting for results from his financial assistance application.    Additional ulcer(s) noted? no.     Past Medical History:   Diagnosis Date    Difficult intravenous access     not bedside candidate for PICC lines both arms no thread, needs IR or tunneled    DVT (deep venous thrombosis) (HCC)     Hx of blood clots     Pain and swelling of right lower leg        His current medication list consists of Compression Stockings, Probiotic Daily, ibuprofen, and warfarin.    Allergies: Patient has no known allergies.    Objective:     Vitals:    25 0821   BP: (!) 144/82   Pulse: 71   Resp: 16   Temp: 97.5 °F (36.4 °C)   TempSrc: Temporal       Physical  Constitutional:  NAD, obese  Respiratory:  Normal effort  Psychiatric:  Mood and affect appropriate  Lymphatic: Trace edema, without cellulitis  Kiah-ulcer skin: Lymphedema and hemosiderin changes.  Ulcer(s): Yellow, fibrin, granular, pink. Photos also saved in electronic chart.    Today's wound measurements, per RN documentation:  Wound 24 Ankle Right;Medial #4-Wound Length (cm): 1.8 cm  Wound 22 Leg Right;Lower;Medial;Proximal #1-Wound Length (cm): 0.4 cm  Wound 25 Leg Right;Lower;Lateral #5-Wound Length (cm): 0 cm  Wound 22 Leg Right;Anterior;Lower #2-Wound Length (cm): 0.6 cm  Wound 22 Leg Right;Posterior;Lower #3 cluster-Wound Length (cm): 0.7 cm    Wound 24 Ankle Right;Medial #4-Wound Width (cm): 1.1 cm  Wound 22 Leg Right;Lower;Medial;Proximal #1-Wound Width (cm): 0.2 cm  Wound 25 Leg

## 2025-07-28 NOTE — PATIENT INSTRUCTIONS
[facility] Center Physician Orders and Discharge Instructions  [facility]   [address] [place]  Telephone: [phone] FAX [phone]  NAME:  Justin Baeza  YOB: 1965  MEDICAL RECORD NUMBER:  [id number]  DATE: 7/28/2025      Return Appointment:  Return Appointment: With Dr. Patten in 1 Week(s)  [x] Return Appointment for a Wound Assessment with the nurse on: Tuesday    Future Appointments   Date Time Provider Department Center   8/25/2025  8:00 AM [facility]       No orders of the defined types were placed in this encounter.    Continue with probiotics       Wound care instructions:  If you smoke we ask that you refrain from smoking. Smoking inhibits wounds from healing.  When taking antibiotics take the entire prescription as ordered. Do not stop taking until medication is all gone unless otherwise instructed.   Exercise as tolerated.   Keep weight off wounds and reposition every 2 hours if applicable.  Do not get wounds wet in the bath or shower unless otherwise instructed by your physician. If your wound is on your foot or leg, you may purchase a cast bag. Please ask at the pharmacy.  Wash hands with soap and water prior to and after every dressing change.    [x]Wash wounds with: No need to wash. Leave dressing in place.    [x][place] wound Topical Treatments: Do not apply lotions, creams, or ointments to the skin around the wound bed unless directed as followed:   Apply around the wound:  moisturizing lotion, zinc paste.         [x]Wound Location: right lower leg wounds  Apply Primary Dressing to wound: polymem AG. Multidex to anterior wound then poly mem ag  Secondary Dressing: ABD   Avoid contact of tape with skin if possible.  When to change Dressing: DO NOT CHANGE      [x] Multilayer Compression Wrap:  Type: Applied on Right lower leg(s)  4 Layer Compression Wrap    Do not get leg(s) with wrap wet.  If wraps

## 2025-08-04 ENCOUNTER — HOSPITAL ENCOUNTER (OUTPATIENT)
Dept: WOUND CARE | Age: 60
Discharge: HOME | End: 2025-08-04
Attending: SPECIALIST
Payer: COMMERCIAL

## 2025-08-04 VITALS
HEART RATE: 65 BPM | RESPIRATION RATE: 18 BRPM | DIASTOLIC BLOOD PRESSURE: 79 MMHG | SYSTOLIC BLOOD PRESSURE: 136 MMHG | TEMPERATURE: 97.8 F

## 2025-08-04 DIAGNOSIS — I89.0 LYMPHEDEMA: ICD-10-CM

## 2025-08-04 DIAGNOSIS — L97.909 VENOUS ULCER (HCC): ICD-10-CM

## 2025-08-04 DIAGNOSIS — Z91.199 NONCOMPLIANCE: ICD-10-CM

## 2025-08-04 DIAGNOSIS — I83.009 VENOUS ULCER (HCC): ICD-10-CM

## 2025-08-04 DIAGNOSIS — I89.0 CHRONIC ACQUIRED LYMPHEDEMA: Primary | ICD-10-CM

## 2025-08-04 DIAGNOSIS — I87.331 IDIOPATHIC CHRONIC VENOUS HYPERTENSION OF RIGHT LOWER EXTREMITY WITH ULCER AND INFLAMMATION (HCC): ICD-10-CM

## 2025-08-04 PROCEDURE — 99213 OFFICE O/P EST LOW 20 MIN: CPT | Performed by: STUDENT IN AN ORGANIZED HEALTH CARE EDUCATION/TRAINING PROGRAM

## 2025-08-04 PROCEDURE — 97597 DBRDMT OPN WND 1ST 20 CM/<: CPT | Performed by: STUDENT IN AN ORGANIZED HEALTH CARE EDUCATION/TRAINING PROGRAM

## 2025-08-04 PROCEDURE — 29581 APPL MULTLAYER CMPRN SYS LEG: CPT

## 2025-08-04 PROCEDURE — 6370000000 HC RX 637 (ALT 250 FOR IP): Performed by: STUDENT IN AN ORGANIZED HEALTH CARE EDUCATION/TRAINING PROGRAM

## 2025-08-04 PROCEDURE — 97597 DBRDMT OPN WND 1ST 20 CM/<: CPT

## 2025-08-04 RX ORDER — LIDOCAINE HYDROCHLORIDE 20 MG/ML
JELLY TOPICAL ONCE
OUTPATIENT
Start: 2025-08-04 | End: 2025-08-04

## 2025-08-04 RX ORDER — BACITRACIN ZINC AND POLYMYXIN B SULFATE 500; 1000 [USP'U]/G; [USP'U]/G
OINTMENT TOPICAL ONCE
OUTPATIENT
Start: 2025-08-04 | End: 2025-08-04

## 2025-08-04 RX ORDER — GINSENG 100 MG
CAPSULE ORAL ONCE
OUTPATIENT
Start: 2025-08-04 | End: 2025-08-04

## 2025-08-04 RX ORDER — TRIAMCINOLONE ACETONIDE 1 MG/G
OINTMENT TOPICAL ONCE
OUTPATIENT
Start: 2025-08-04 | End: 2025-08-04

## 2025-08-04 RX ORDER — LIDOCAINE 50 MG/G
OINTMENT TOPICAL ONCE
OUTPATIENT
Start: 2025-08-04 | End: 2025-08-04

## 2025-08-04 RX ORDER — MUPIROCIN 2 %
OINTMENT (GRAM) TOPICAL ONCE
OUTPATIENT
Start: 2025-08-04 | End: 2025-08-04

## 2025-08-04 RX ORDER — LIDOCAINE 40 MG/G
CREAM TOPICAL ONCE
Status: COMPLETED | OUTPATIENT
Start: 2025-08-04 | End: 2025-08-04

## 2025-08-04 RX ORDER — LIDOCAINE 40 MG/G
CREAM TOPICAL ONCE
OUTPATIENT
Start: 2025-08-04 | End: 2025-08-04

## 2025-08-04 RX ORDER — BETAMETHASONE DIPROPIONATE 0.05 %
OINTMENT (GRAM) TOPICAL ONCE
OUTPATIENT
Start: 2025-08-04 | End: 2025-08-04

## 2025-08-04 RX ORDER — LIDOCAINE HYDROCHLORIDE 40 MG/ML
SOLUTION TOPICAL ONCE
OUTPATIENT
Start: 2025-08-04 | End: 2025-08-04

## 2025-08-04 RX ORDER — CLOBETASOL PROPIONATE 0.5 MG/G
OINTMENT TOPICAL ONCE
OUTPATIENT
Start: 2025-08-04 | End: 2025-08-04

## 2025-08-04 RX ORDER — SILVER SULFADIAZINE 10 MG/G
CREAM TOPICAL ONCE
OUTPATIENT
Start: 2025-08-04 | End: 2025-08-04

## 2025-08-04 RX ORDER — GENTAMICIN SULFATE 1 MG/G
OINTMENT TOPICAL ONCE
OUTPATIENT
Start: 2025-08-04 | End: 2025-08-04

## 2025-08-04 RX ORDER — SODIUM CHLOR/HYPOCHLOROUS ACID 0.033 %
SOLUTION, IRRIGATION IRRIGATION ONCE
OUTPATIENT
Start: 2025-08-04 | End: 2025-08-04

## 2025-08-04 RX ORDER — NEOMYCIN/BACITRACIN/POLYMYXINB 3.5-400-5K
OINTMENT (GRAM) TOPICAL ONCE
OUTPATIENT
Start: 2025-08-04 | End: 2025-08-04

## 2025-08-04 RX ADMIN — LIDOCAINE: 40 CREAM TOPICAL at 08:40

## 2025-08-04 ASSESSMENT — PAIN DESCRIPTION - LOCATION: LOCATION: LEG

## 2025-08-04 ASSESSMENT — PAIN SCALES - GENERAL: PAINLEVEL_OUTOF10: 1

## 2025-08-04 ASSESSMENT — PAIN DESCRIPTION - DESCRIPTORS: DESCRIPTORS: ACHING

## 2025-08-04 ASSESSMENT — PAIN DESCRIPTION - ORIENTATION: ORIENTATION: RIGHT

## 2025-08-11 ENCOUNTER — HOSPITAL ENCOUNTER (OUTPATIENT)
Dept: WOUND CARE | Age: 60
Discharge: HOME | End: 2025-08-11
Attending: SPECIALIST
Payer: COMMERCIAL

## 2025-08-11 VITALS
RESPIRATION RATE: 18 BRPM | DIASTOLIC BLOOD PRESSURE: 87 MMHG | HEART RATE: 65 BPM | TEMPERATURE: 97.6 F | SYSTOLIC BLOOD PRESSURE: 146 MMHG

## 2025-08-11 DIAGNOSIS — L97.909 VENOUS ULCER (HCC): ICD-10-CM

## 2025-08-11 DIAGNOSIS — I87.331 IDIOPATHIC CHRONIC VENOUS HYPERTENSION OF RIGHT LOWER EXTREMITY WITH ULCER AND INFLAMMATION (HCC): ICD-10-CM

## 2025-08-11 DIAGNOSIS — I89.0 LYMPHEDEMA: ICD-10-CM

## 2025-08-11 DIAGNOSIS — Z91.199 NONCOMPLIANCE: ICD-10-CM

## 2025-08-11 DIAGNOSIS — I89.0 CHRONIC ACQUIRED LYMPHEDEMA: Primary | ICD-10-CM

## 2025-08-11 DIAGNOSIS — I83.009 VENOUS ULCER (HCC): ICD-10-CM

## 2025-08-11 PROCEDURE — 29581 APPL MULTLAYER CMPRN SYS LEG: CPT

## 2025-08-11 PROCEDURE — 6370000000 HC RX 637 (ALT 250 FOR IP): Performed by: STUDENT IN AN ORGANIZED HEALTH CARE EDUCATION/TRAINING PROGRAM

## 2025-08-11 PROCEDURE — 11042 DBRDMT SUBQ TIS 1ST 20SQCM/<: CPT

## 2025-08-11 RX ORDER — GINSENG 100 MG
CAPSULE ORAL ONCE
OUTPATIENT
Start: 2025-08-11 | End: 2025-08-11

## 2025-08-11 RX ORDER — GENTAMICIN SULFATE 1 MG/G
OINTMENT TOPICAL ONCE
OUTPATIENT
Start: 2025-08-11 | End: 2025-08-11

## 2025-08-11 RX ORDER — SODIUM CHLOR/HYPOCHLOROUS ACID 0.033 %
SOLUTION, IRRIGATION IRRIGATION ONCE
OUTPATIENT
Start: 2025-08-11 | End: 2025-08-11

## 2025-08-11 RX ORDER — LIDOCAINE HYDROCHLORIDE 20 MG/ML
JELLY TOPICAL ONCE
OUTPATIENT
Start: 2025-08-11 | End: 2025-08-11

## 2025-08-11 RX ORDER — BETAMETHASONE DIPROPIONATE 0.05 %
OINTMENT (GRAM) TOPICAL ONCE
OUTPATIENT
Start: 2025-08-11 | End: 2025-08-11

## 2025-08-11 RX ORDER — BACITRACIN ZINC AND POLYMYXIN B SULFATE 500; 1000 [USP'U]/G; [USP'U]/G
OINTMENT TOPICAL ONCE
OUTPATIENT
Start: 2025-08-11 | End: 2025-08-11

## 2025-08-11 RX ORDER — MUPIROCIN 2 %
OINTMENT (GRAM) TOPICAL ONCE
OUTPATIENT
Start: 2025-08-11 | End: 2025-08-11

## 2025-08-11 RX ORDER — SILVER SULFADIAZINE 10 MG/G
CREAM TOPICAL ONCE
OUTPATIENT
Start: 2025-08-11 | End: 2025-08-11

## 2025-08-11 RX ORDER — LIDOCAINE 40 MG/G
CREAM TOPICAL ONCE
OUTPATIENT
Start: 2025-08-11 | End: 2025-08-11

## 2025-08-11 RX ORDER — NEOMYCIN/BACITRACIN/POLYMYXINB 3.5-400-5K
OINTMENT (GRAM) TOPICAL ONCE
OUTPATIENT
Start: 2025-08-11 | End: 2025-08-11

## 2025-08-11 RX ORDER — LIDOCAINE 40 MG/G
CREAM TOPICAL ONCE
Status: COMPLETED | OUTPATIENT
Start: 2025-08-11 | End: 2025-08-11

## 2025-08-11 RX ORDER — CLOBETASOL PROPIONATE 0.5 MG/G
OINTMENT TOPICAL ONCE
OUTPATIENT
Start: 2025-08-11 | End: 2025-08-11

## 2025-08-11 RX ORDER — TRIAMCINOLONE ACETONIDE 1 MG/G
OINTMENT TOPICAL ONCE
OUTPATIENT
Start: 2025-08-11 | End: 2025-08-11

## 2025-08-11 RX ORDER — LIDOCAINE 50 MG/G
OINTMENT TOPICAL ONCE
OUTPATIENT
Start: 2025-08-11 | End: 2025-08-11

## 2025-08-11 RX ORDER — LIDOCAINE HYDROCHLORIDE 40 MG/ML
SOLUTION TOPICAL ONCE
OUTPATIENT
Start: 2025-08-11 | End: 2025-08-11

## 2025-08-11 RX ADMIN — LIDOCAINE: 40 CREAM TOPICAL at 08:54

## 2025-08-11 ASSESSMENT — PAIN DESCRIPTION - LOCATION: LOCATION: LEG

## 2025-08-11 ASSESSMENT — PAIN SCALES - GENERAL: PAINLEVEL_OUTOF10: 1

## 2025-08-11 ASSESSMENT — PAIN DESCRIPTION - DESCRIPTORS: DESCRIPTORS: ACHING

## 2025-08-11 ASSESSMENT — PAIN DESCRIPTION - ORIENTATION: ORIENTATION: RIGHT

## 2025-08-18 ENCOUNTER — HOSPITAL ENCOUNTER (OUTPATIENT)
Dept: WOUND CARE | Age: 60
Discharge: HOME | End: 2025-08-18
Attending: SPECIALIST
Payer: COMMERCIAL

## 2025-08-18 VITALS
HEART RATE: 69 BPM | SYSTOLIC BLOOD PRESSURE: 146 MMHG | RESPIRATION RATE: 16 BRPM | DIASTOLIC BLOOD PRESSURE: 80 MMHG | TEMPERATURE: 97.9 F

## 2025-08-18 DIAGNOSIS — I87.331 IDIOPATHIC CHRONIC VENOUS HYPERTENSION OF RIGHT LOWER EXTREMITY WITH ULCER AND INFLAMMATION (HCC): ICD-10-CM

## 2025-08-18 DIAGNOSIS — L97.909 VENOUS ULCER (HCC): ICD-10-CM

## 2025-08-18 DIAGNOSIS — L03.115 CELLULITIS OF RIGHT LOWER EXTREMITY: ICD-10-CM

## 2025-08-18 DIAGNOSIS — I89.0 LYMPHEDEMA: ICD-10-CM

## 2025-08-18 DIAGNOSIS — I89.0 CHRONIC ACQUIRED LYMPHEDEMA: Primary | ICD-10-CM

## 2025-08-18 DIAGNOSIS — I83.009 VENOUS ULCER (HCC): ICD-10-CM

## 2025-08-18 DIAGNOSIS — Z91.199 NONCOMPLIANCE: ICD-10-CM

## 2025-08-18 PROCEDURE — 6370000000 HC RX 637 (ALT 250 FOR IP): Performed by: STUDENT IN AN ORGANIZED HEALTH CARE EDUCATION/TRAINING PROGRAM

## 2025-08-18 PROCEDURE — 29581 APPL MULTLAYER CMPRN SYS LEG: CPT | Performed by: STUDENT IN AN ORGANIZED HEALTH CARE EDUCATION/TRAINING PROGRAM

## 2025-08-18 PROCEDURE — 29581 APPL MULTLAYER CMPRN SYS LEG: CPT

## 2025-08-18 PROCEDURE — 99213 OFFICE O/P EST LOW 20 MIN: CPT | Performed by: STUDENT IN AN ORGANIZED HEALTH CARE EDUCATION/TRAINING PROGRAM

## 2025-08-18 PROCEDURE — 11042 DBRDMT SUBQ TIS 1ST 20SQCM/<: CPT | Performed by: STUDENT IN AN ORGANIZED HEALTH CARE EDUCATION/TRAINING PROGRAM

## 2025-08-18 PROCEDURE — 11042 DBRDMT SUBQ TIS 1ST 20SQCM/<: CPT

## 2025-08-18 RX ORDER — LIDOCAINE HYDROCHLORIDE 40 MG/ML
SOLUTION TOPICAL ONCE
OUTPATIENT
Start: 2025-08-18 | End: 2025-08-18

## 2025-08-18 RX ORDER — LIDOCAINE HYDROCHLORIDE 20 MG/ML
JELLY TOPICAL ONCE
OUTPATIENT
Start: 2025-08-18 | End: 2025-08-18

## 2025-08-18 RX ORDER — BACITRACIN ZINC AND POLYMYXIN B SULFATE 500; 1000 [USP'U]/G; [USP'U]/G
OINTMENT TOPICAL ONCE
OUTPATIENT
Start: 2025-08-18 | End: 2025-08-18

## 2025-08-18 RX ORDER — GENTAMICIN SULFATE 1 MG/G
OINTMENT TOPICAL ONCE
OUTPATIENT
Start: 2025-08-18 | End: 2025-08-18

## 2025-08-18 RX ORDER — TRIAMCINOLONE ACETONIDE 1 MG/G
OINTMENT TOPICAL ONCE
OUTPATIENT
Start: 2025-08-18 | End: 2025-08-18

## 2025-08-18 RX ORDER — SODIUM CHLOR/HYPOCHLOROUS ACID 0.033 %
SOLUTION, IRRIGATION IRRIGATION ONCE
OUTPATIENT
Start: 2025-08-18 | End: 2025-08-18

## 2025-08-18 RX ORDER — BETAMETHASONE DIPROPIONATE 0.05 %
OINTMENT (GRAM) TOPICAL ONCE
OUTPATIENT
Start: 2025-08-18 | End: 2025-08-18

## 2025-08-18 RX ORDER — CLOBETASOL PROPIONATE 0.5 MG/G
OINTMENT TOPICAL ONCE
OUTPATIENT
Start: 2025-08-18 | End: 2025-08-18

## 2025-08-18 RX ORDER — LIDOCAINE 40 MG/G
CREAM TOPICAL ONCE
OUTPATIENT
Start: 2025-08-18 | End: 2025-08-18

## 2025-08-18 RX ORDER — GINSENG 100 MG
CAPSULE ORAL ONCE
OUTPATIENT
Start: 2025-08-18 | End: 2025-08-18

## 2025-08-18 RX ORDER — NEOMYCIN/BACITRACIN/POLYMYXINB 3.5-400-5K
OINTMENT (GRAM) TOPICAL ONCE
OUTPATIENT
Start: 2025-08-18 | End: 2025-08-18

## 2025-08-18 RX ORDER — LIDOCAINE 40 MG/G
CREAM TOPICAL ONCE
Status: COMPLETED | OUTPATIENT
Start: 2025-08-18 | End: 2025-08-18

## 2025-08-18 RX ORDER — MUPIROCIN 2 %
OINTMENT (GRAM) TOPICAL ONCE
OUTPATIENT
Start: 2025-08-18 | End: 2025-08-18

## 2025-08-18 RX ORDER — SILVER SULFADIAZINE 10 MG/G
CREAM TOPICAL ONCE
OUTPATIENT
Start: 2025-08-18 | End: 2025-08-18

## 2025-08-18 RX ORDER — LIDOCAINE 50 MG/G
OINTMENT TOPICAL ONCE
OUTPATIENT
Start: 2025-08-18 | End: 2025-08-18

## 2025-08-18 RX ADMIN — LIDOCAINE: 40 CREAM TOPICAL at 08:48

## 2025-08-18 ASSESSMENT — PAIN SCALES - GENERAL: PAINLEVEL_OUTOF10: 1

## 2025-08-18 ASSESSMENT — PAIN DESCRIPTION - LOCATION: LOCATION: LEG

## 2025-08-18 ASSESSMENT — PAIN DESCRIPTION - ORIENTATION: ORIENTATION: RIGHT

## 2025-08-18 ASSESSMENT — PAIN DESCRIPTION - DESCRIPTORS: DESCRIPTORS: ACHING

## 2025-08-25 ENCOUNTER — HOSPITAL ENCOUNTER (OUTPATIENT)
Dept: WOUND CARE | Age: 60
Discharge: HOME | End: 2025-08-25
Attending: SPECIALIST
Payer: COMMERCIAL

## 2025-08-25 DIAGNOSIS — I89.0 LYMPHEDEMA: ICD-10-CM

## 2025-08-25 DIAGNOSIS — I83.009 VENOUS ULCER (HCC): ICD-10-CM

## 2025-08-25 DIAGNOSIS — I87.331 IDIOPATHIC CHRONIC VENOUS HYPERTENSION OF RIGHT LOWER EXTREMITY WITH ULCER AND INFLAMMATION (HCC): ICD-10-CM

## 2025-08-25 DIAGNOSIS — Z91.199 NONCOMPLIANCE: ICD-10-CM

## 2025-08-25 DIAGNOSIS — L97.909 VENOUS ULCER (HCC): ICD-10-CM

## 2025-08-25 DIAGNOSIS — I89.0 CHRONIC ACQUIRED LYMPHEDEMA: Primary | ICD-10-CM

## 2025-08-25 PROCEDURE — 29581 APPL MULTLAYER CMPRN SYS LEG: CPT

## 2025-08-25 RX ORDER — GINSENG 100 MG
CAPSULE ORAL ONCE
OUTPATIENT
Start: 2025-08-25 | End: 2025-08-25

## 2025-08-25 RX ORDER — BETAMETHASONE DIPROPIONATE 0.05 %
OINTMENT (GRAM) TOPICAL ONCE
OUTPATIENT
Start: 2025-08-25 | End: 2025-08-25

## 2025-08-25 RX ORDER — MUPIROCIN 2 %
OINTMENT (GRAM) TOPICAL ONCE
OUTPATIENT
Start: 2025-08-25 | End: 2025-08-25

## 2025-08-25 RX ORDER — TRIAMCINOLONE ACETONIDE 1 MG/G
OINTMENT TOPICAL ONCE
OUTPATIENT
Start: 2025-08-25 | End: 2025-08-25

## 2025-08-25 RX ORDER — LIDOCAINE HYDROCHLORIDE 20 MG/ML
JELLY TOPICAL ONCE
OUTPATIENT
Start: 2025-08-25 | End: 2025-08-25

## 2025-08-25 RX ORDER — LIDOCAINE 50 MG/G
OINTMENT TOPICAL ONCE
OUTPATIENT
Start: 2025-08-25 | End: 2025-08-25

## 2025-08-25 RX ORDER — CLOBETASOL PROPIONATE 0.5 MG/G
OINTMENT TOPICAL ONCE
OUTPATIENT
Start: 2025-08-25 | End: 2025-08-25

## 2025-08-25 RX ORDER — NEOMYCIN/BACITRACIN/POLYMYXINB 3.5-400-5K
OINTMENT (GRAM) TOPICAL ONCE
OUTPATIENT
Start: 2025-08-25 | End: 2025-08-25

## 2025-08-25 RX ORDER — SILVER SULFADIAZINE 10 MG/G
CREAM TOPICAL ONCE
OUTPATIENT
Start: 2025-08-25 | End: 2025-08-25

## 2025-08-25 RX ORDER — LIDOCAINE HYDROCHLORIDE 40 MG/ML
SOLUTION TOPICAL ONCE
OUTPATIENT
Start: 2025-08-25 | End: 2025-08-25

## 2025-08-25 RX ORDER — BACITRACIN ZINC AND POLYMYXIN B SULFATE 500; 1000 [USP'U]/G; [USP'U]/G
OINTMENT TOPICAL ONCE
OUTPATIENT
Start: 2025-08-25 | End: 2025-08-25

## 2025-08-25 RX ORDER — GENTAMICIN SULFATE 1 MG/G
OINTMENT TOPICAL ONCE
OUTPATIENT
Start: 2025-08-25 | End: 2025-08-25

## 2025-08-25 RX ORDER — LIDOCAINE 40 MG/G
CREAM TOPICAL ONCE
OUTPATIENT
Start: 2025-08-25 | End: 2025-08-25

## 2025-08-25 RX ORDER — SODIUM CHLOR/HYPOCHLOROUS ACID 0.033 %
SOLUTION, IRRIGATION IRRIGATION ONCE
OUTPATIENT
Start: 2025-08-25 | End: 2025-08-25

## 2025-09-02 ENCOUNTER — HOSPITAL ENCOUNTER (OUTPATIENT)
Dept: WOUND CARE | Age: 60
Discharge: HOME | End: 2025-09-02
Attending: SPECIALIST
Payer: COMMERCIAL

## 2025-09-02 VITALS
HEART RATE: 73 BPM | TEMPERATURE: 98.1 F | RESPIRATION RATE: 16 BRPM | SYSTOLIC BLOOD PRESSURE: 147 MMHG | DIASTOLIC BLOOD PRESSURE: 84 MMHG

## 2025-09-02 PROCEDURE — 29581 APPL MULTLAYER CMPRN SYS LEG: CPT

## 2025-09-02 ASSESSMENT — PAIN DESCRIPTION - LOCATION: LOCATION: LEG

## 2025-09-02 ASSESSMENT — PAIN SCALES - GENERAL: PAINLEVEL_OUTOF10: 1

## 2025-09-02 ASSESSMENT — PAIN DESCRIPTION - ORIENTATION: ORIENTATION: RIGHT

## 2025-09-02 ASSESSMENT — PAIN DESCRIPTION - DESCRIPTORS: DESCRIPTORS: ACHING
